# Patient Record
Sex: FEMALE | Race: BLACK OR AFRICAN AMERICAN | NOT HISPANIC OR LATINO
[De-identification: names, ages, dates, MRNs, and addresses within clinical notes are randomized per-mention and may not be internally consistent; named-entity substitution may affect disease eponyms.]

---

## 2017-01-04 ENCOUNTER — APPOINTMENT (OUTPATIENT)
Dept: INFUSION THERAPY | Facility: CLINIC | Age: 72
End: 2017-01-04

## 2017-01-06 LAB
BASOPHILS # BLD: 0.09 TH/MM3
BASOPHILS NFR BLD: 2.8 %
EOSINOPHIL # BLD: 0.06 TH/MM3
EOSINOPHIL NFR BLD: 1.9 %
ERYTHROCYTE [DISTWIDTH] IN BLOOD BY AUTOMATED COUNT: 15.1 %
GRANULOCYTES # BLD: 1.35 TH/MM3
GRANULOCYTES NFR BLD: 41.9 %
HCT VFR BLD AUTO: 29.5 %
HGB BLD-MCNC: 9.6 G/DL
IMM GRANULOCYTES # BLD: 0.38 TH/MM3
IMM GRANULOCYTES NFR BLD: 11.8 %
LYMPHOCYTES # BLD: 0.99 TH/MM3
LYMPHOCYTES NFR BLD: 30.7 %
MCH RBC QN AUTO: 34.7 PG
MCHC RBC AUTO-ENTMCNC: 32.5 G/DL
MCV RBC AUTO: 106.5 FL
MONOCYTES # BLD: 0.35 TH/MM3
MONOCYTES NFR BLD: 10.9 %
PLATELET # BLD: 204 TH/MM3
PMV BLD AUTO: 10.3 FL
RBC # BLD AUTO: 2.77 MIL/MM3
WBC # BLD: 3.22 TH/MM3

## 2017-01-11 ENCOUNTER — APPOINTMENT (OUTPATIENT)
Dept: INFUSION THERAPY | Facility: CLINIC | Age: 72
End: 2017-01-11

## 2017-01-11 VITALS
TEMPERATURE: 98.6 F | HEART RATE: 93 BPM | DIASTOLIC BLOOD PRESSURE: 58 MMHG | RESPIRATION RATE: 16 BRPM | SYSTOLIC BLOOD PRESSURE: 115 MMHG

## 2017-01-17 LAB
BASOPHILS # BLD: 0.15 TH/MM3
BASOPHILS NFR BLD: 2.7 %
EOSINOPHIL # BLD: 0.3 TH/MM3
EOSINOPHIL NFR BLD: 5.5 %
ERYTHROCYTE [DISTWIDTH] IN BLOOD BY AUTOMATED COUNT: 15.4 %
GRANULOCYTES # BLD: 2.78 TH/MM3
GRANULOCYTES NFR BLD: 50.9 %
HCT VFR BLD AUTO: 30.9 %
HGB BLD-MCNC: 10 G/DL
IMM GRANULOCYTES # BLD: 0.12 TH/MM3
IMM GRANULOCYTES NFR BLD: 2.2 %
LYMPHOCYTES # BLD: 1.26 TH/MM3
LYMPHOCYTES NFR BLD: 23.1 %
MCH RBC QN AUTO: 35.6 PG
MCHC RBC AUTO-ENTMCNC: 32.4 G/DL
MCV RBC AUTO: 110 FL
MONOCYTES # BLD: 0.85 TH/MM3
MONOCYTES NFR BLD: 15.6 %
PLATELET # BLD: 448 TH/MM3
PMV BLD AUTO: 9.8 FL
RBC # BLD AUTO: 2.81 MIL/MM3
WBC # BLD: 5.46 TH/MM3

## 2017-01-20 ENCOUNTER — APPOINTMENT (OUTPATIENT)
Dept: INFUSION THERAPY | Facility: CLINIC | Age: 72
End: 2017-01-20

## 2017-01-25 ENCOUNTER — APPOINTMENT (OUTPATIENT)
Dept: INFUSION THERAPY | Facility: CLINIC | Age: 72
End: 2017-01-25

## 2017-01-26 LAB
BASOPHILS # BLD: 0.08 TH/MM3
BASOPHILS NFR BLD: 2.7 %
EOSINOPHIL # BLD: 0.13 TH/MM3
EOSINOPHIL NFR BLD: 4.3 %
ERYTHROCYTE [DISTWIDTH] IN BLOOD BY AUTOMATED COUNT: 14 %
GRANULOCYTES # BLD: 1.16 TH/MM3
GRANULOCYTES NFR BLD: 38.6 %
HCT VFR BLD AUTO: 32 %
HGB BLD-MCNC: 10.8 G/DL
IMM GRANULOCYTES # BLD: 0.02 TH/MM3
IMM GRANULOCYTES NFR BLD: 0.7 %
LYMPHOCYTES # BLD: 1.05 TH/MM3
LYMPHOCYTES NFR BLD: 35 %
MCH RBC QN AUTO: 35.4 PG
MCHC RBC AUTO-ENTMCNC: 33.8 G/DL
MCV RBC AUTO: 104.9 FL
MONOCYTES # BLD: 0.56 TH/MM3
MONOCYTES NFR BLD: 18.7 %
PLATELET # BLD: 214 TH/MM3
PMV BLD AUTO: 9.9 FL
RBC # BLD AUTO: 3.05 MIL/MM3
WBC # BLD: 3 TH/MM3

## 2017-02-08 ENCOUNTER — APPOINTMENT (OUTPATIENT)
Dept: INFUSION THERAPY | Facility: CLINIC | Age: 72
End: 2017-02-08

## 2017-02-15 ENCOUNTER — APPOINTMENT (OUTPATIENT)
Dept: INFUSION THERAPY | Facility: CLINIC | Age: 72
End: 2017-02-15

## 2017-02-16 LAB
BASOPHILS # BLD: 0.14 TH/MM3
BASOPHILS NFR BLD: 3.7 %
EOSINOPHIL # BLD: 0.37 TH/MM3
EOSINOPHIL NFR BLD: 9.7 %
ERYTHROCYTE [DISTWIDTH] IN BLOOD BY AUTOMATED COUNT: 12.8 %
GRANULOCYTES # BLD: 1.57 TH/MM3
GRANULOCYTES NFR BLD: 41.3 %
HCT VFR BLD AUTO: 33.3 %
HGB BLD-MCNC: 11.1 G/DL
IMM GRANULOCYTES # BLD: 0.04 TH/MM3
IMM GRANULOCYTES NFR BLD: 1.1 %
LYMPHOCYTES # BLD: 1.05 TH/MM3
LYMPHOCYTES NFR BLD: 27.6 %
MCH RBC QN AUTO: 35.1 PG
MCHC RBC AUTO-ENTMCNC: 33.3 G/DL
MCV RBC AUTO: 105.4 FL
MONOCYTES # BLD: 0.63 TH/MM3
MONOCYTES NFR BLD: 16.6 %
PLATELET # BLD: 133 TH/MM3
PMV BLD AUTO: 10.2 FL
RBC # BLD AUTO: 3.16 MIL/MM3
WBC # BLD: 3.8 TH/MM3

## 2017-02-21 ENCOUNTER — APPOINTMENT (OUTPATIENT)
Dept: INFUSION THERAPY | Facility: CLINIC | Age: 72
End: 2017-02-21

## 2017-02-21 ENCOUNTER — APPOINTMENT (OUTPATIENT)
Dept: HEMATOLOGY ONCOLOGY | Facility: CLINIC | Age: 72
End: 2017-02-21

## 2017-02-21 VITALS
SYSTOLIC BLOOD PRESSURE: 143 MMHG | RESPIRATION RATE: 14 BRPM | HEART RATE: 94 BPM | HEIGHT: 62 IN | DIASTOLIC BLOOD PRESSURE: 63 MMHG | WEIGHT: 143 LBS | TEMPERATURE: 97.6 F | BODY MASS INDEX: 26.31 KG/M2

## 2017-02-21 LAB
BASOPHILS # BLD: 0.07 TH/MM3
BASOPHILS NFR BLD: 1.9 %
EOSINOPHIL # BLD: 0.26 TH/MM3
EOSINOPHIL NFR BLD: 7 %
ERYTHROCYTE [DISTWIDTH] IN BLOOD BY AUTOMATED COUNT: 12.8 %
GRANULOCYTES # BLD: 2.14 TH/MM3
GRANULOCYTES NFR BLD: 57.2 %
HCT VFR BLD AUTO: 33.7 %
HGB BLD-MCNC: 11.3 G/DL
IMM GRANULOCYTES # BLD: 0.05 TH/MM3
IMM GRANULOCYTES NFR BLD: 1.3 %
LYMPHOCYTES # BLD: 0.97 TH/MM3
LYMPHOCYTES NFR BLD: 25.9 %
MCH RBC QN AUTO: 34.8 PG
MCHC RBC AUTO-ENTMCNC: 33.5 G/DL
MCV RBC AUTO: 103.7 FL
MONOCYTES # BLD: 0.25 TH/MM3
MONOCYTES NFR BLD: 6.7 %
PLATELET # BLD: 126 TH/MM3
PMV BLD AUTO: 10 FL
RBC # BLD AUTO: 3.25 MIL/MM3
WBC # BLD: 3.74 TH/MM3

## 2017-03-01 ENCOUNTER — APPOINTMENT (OUTPATIENT)
Dept: INFUSION THERAPY | Facility: CLINIC | Age: 72
End: 2017-03-01

## 2017-03-08 ENCOUNTER — APPOINTMENT (OUTPATIENT)
Dept: INFUSION THERAPY | Facility: CLINIC | Age: 72
End: 2017-03-08

## 2017-03-15 ENCOUNTER — APPOINTMENT (OUTPATIENT)
Dept: INFUSION THERAPY | Facility: CLINIC | Age: 72
End: 2017-03-15

## 2017-03-22 ENCOUNTER — APPOINTMENT (OUTPATIENT)
Dept: HEMATOLOGY ONCOLOGY | Facility: CLINIC | Age: 72
End: 2017-03-22

## 2017-03-22 VITALS
WEIGHT: 141 LBS | TEMPERATURE: 97.9 F | HEART RATE: 82 BPM | HEIGHT: 62 IN | RESPIRATION RATE: 15 BRPM | BODY MASS INDEX: 25.95 KG/M2 | DIASTOLIC BLOOD PRESSURE: 63 MMHG | SYSTOLIC BLOOD PRESSURE: 131 MMHG

## 2017-03-22 LAB
BASOPHILS # BLD: 0.04 TH/MM3
BASOPHILS NFR BLD: 1.8 %
EOSINOPHIL # BLD: 0.06 TH/MM3
EOSINOPHIL NFR BLD: 2.7 %
ERYTHROCYTE [DISTWIDTH] IN BLOOD BY AUTOMATED COUNT: 13.4 %
GRANULOCYTES # BLD: 0.85 TH/MM3
GRANULOCYTES NFR BLD: 37.9 %
HCT VFR BLD AUTO: 28.6 %
HGB BLD-MCNC: 9.5 G/DL
IMM GRANULOCYTES # BLD: 0.03 TH/MM3
IMM GRANULOCYTES NFR BLD: 1.3 %
LYMPHOCYTES # BLD: 0.84 TH/MM3
LYMPHOCYTES NFR BLD: 37.5 %
MCH RBC QN AUTO: 33.9 PG
MCHC RBC AUTO-ENTMCNC: 33.2 G/DL
MCV RBC AUTO: 102.1 FL
MONOCYTES # BLD: 0.42 TH/MM3
MONOCYTES NFR BLD: 18.8 %
PLATELET # BLD: 171 TH/MM3
PMV BLD AUTO: 9.4 FL
RBC # BLD AUTO: 2.8 MIL/MM3
WBC # BLD: 2.24 TH/MM3

## 2017-03-29 ENCOUNTER — APPOINTMENT (OUTPATIENT)
Dept: INFUSION THERAPY | Facility: CLINIC | Age: 72
End: 2017-03-29

## 2017-04-05 ENCOUNTER — APPOINTMENT (OUTPATIENT)
Dept: INFUSION THERAPY | Facility: CLINIC | Age: 72
End: 2017-04-05

## 2017-04-06 LAB
BASOPHILS # BLD: 0.06 TH/MM3
BASOPHILS NFR BLD: 2 %
EOSINOPHIL # BLD: 0.18 TH/MM3
EOSINOPHIL NFR BLD: 5.9 %
ERYTHROCYTE [DISTWIDTH] IN BLOOD BY AUTOMATED COUNT: 14.3 %
GRANULOCYTES # BLD: 1.03 TH/MM3
GRANULOCYTES NFR BLD: 33.5 %
HCT VFR BLD AUTO: 25.9 %
HGB BLD-MCNC: 8.5 G/DL
IMM GRANULOCYTES # BLD: 0.04 TH/MM3
IMM GRANULOCYTES NFR BLD: 1.3 %
LYMPHOCYTES # BLD: 1.18 TH/MM3
LYMPHOCYTES NFR BLD: 38.4 %
MCH RBC QN AUTO: 34.1 PG
MCHC RBC AUTO-ENTMCNC: 32.8 G/DL
MCV RBC AUTO: 104 FL
MONOCYTES # BLD: 0.58 TH/MM3
MONOCYTES NFR BLD: 18.9 %
PLATELET # BLD: 181 TH/MM3
PMV BLD AUTO: 10 FL
RBC # BLD AUTO: 2.49 MIL/MM3
WBC # BLD: 3.07 TH/MM3

## 2017-04-12 ENCOUNTER — APPOINTMENT (OUTPATIENT)
Dept: HEMATOLOGY ONCOLOGY | Facility: CLINIC | Age: 72
End: 2017-04-12

## 2017-04-12 ENCOUNTER — APPOINTMENT (OUTPATIENT)
Dept: INFUSION THERAPY | Facility: CLINIC | Age: 72
End: 2017-04-12

## 2017-04-12 ENCOUNTER — RESULT REVIEW (OUTPATIENT)
Age: 72
End: 2017-04-12

## 2017-04-13 ENCOUNTER — APPOINTMENT (OUTPATIENT)
Dept: HEMATOLOGY ONCOLOGY | Facility: CLINIC | Age: 72
End: 2017-04-13

## 2017-04-13 VITALS
SYSTOLIC BLOOD PRESSURE: 130 MMHG | BODY MASS INDEX: 26.31 KG/M2 | WEIGHT: 143 LBS | DIASTOLIC BLOOD PRESSURE: 62 MMHG | HEIGHT: 62 IN | HEART RATE: 91 BPM | TEMPERATURE: 98.2 F

## 2017-04-19 ENCOUNTER — APPOINTMENT (OUTPATIENT)
Dept: HEMATOLOGY ONCOLOGY | Facility: CLINIC | Age: 72
End: 2017-04-19

## 2017-04-19 ENCOUNTER — APPOINTMENT (OUTPATIENT)
Dept: INFUSION THERAPY | Facility: CLINIC | Age: 72
End: 2017-04-19

## 2017-04-19 ENCOUNTER — RESULT REVIEW (OUTPATIENT)
Age: 72
End: 2017-04-19

## 2017-04-19 LAB
BASOPHILS # BLD: 0.04 TH/MM3
BASOPHILS NFR BLD: 1.2 %
EOSINOPHIL # BLD: 0.1 TH/MM3
EOSINOPHIL NFR BLD: 3 %
ERYTHROCYTE [DISTWIDTH] IN BLOOD BY AUTOMATED COUNT: 14.9 %
GRANULOCYTES # BLD: 1.32 TH/MM3
GRANULOCYTES NFR BLD: 39.7 %
HCT VFR BLD AUTO: 28 %
HGB BLD-MCNC: 9.2 G/DL
IMM GRANULOCYTES # BLD: 0.03 TH/MM3
IMM GRANULOCYTES NFR BLD: 0.9 %
LYMPHOCYTES # BLD: 1.26 TH/MM3
LYMPHOCYTES NFR BLD: 37.8 %
MCH RBC QN AUTO: 34.8 PG
MCHC RBC AUTO-ENTMCNC: 32.9 G/DL
MCV RBC AUTO: 106.1 FL
MONOCYTES # BLD: 0.58 TH/MM3
MONOCYTES NFR BLD: 17.4 %
PLATELET # BLD: 260 TH/MM3
PMV BLD AUTO: 9.7 FL
RBC # BLD AUTO: 2.64 MIL/MM3
WBC # BLD: 3.33 TH/MM3

## 2017-04-28 ENCOUNTER — APPOINTMENT (OUTPATIENT)
Dept: INFUSION THERAPY | Facility: CLINIC | Age: 72
End: 2017-04-28

## 2017-05-01 LAB
BASOPHILS # BLD: 0.08 TH/MM3
BASOPHILS NFR BLD: 2.4 %
EOSINOPHIL # BLD: 0.25 TH/MM3
EOSINOPHIL NFR BLD: 7.5 %
ERYTHROCYTE [DISTWIDTH] IN BLOOD BY AUTOMATED COUNT: 15 %
GRANULOCYTES # BLD: 1.41 TH/MM3
GRANULOCYTES NFR BLD: 42.5 %
HCT VFR BLD AUTO: 28.1 %
HGB BLD-MCNC: 9.1 G/DL
IMM GRANULOCYTES # BLD: 0.02 TH/MM3
IMM GRANULOCYTES NFR BLD: 0.6 %
LYMPHOCYTES # BLD: 1.06 TH/MM3
LYMPHOCYTES NFR BLD: 31.9 %
MCH RBC QN AUTO: 33.7 PG
MCHC RBC AUTO-ENTMCNC: 32.4 G/DL
MCV RBC AUTO: 104.1 FL
MONOCYTES # BLD: 0.5 TH/MM3
MONOCYTES NFR BLD: 15.1 %
PLATELET # BLD: 197 TH/MM3
PMV BLD AUTO: 10.1 FL
RBC # BLD AUTO: 2.7 MIL/MM3
WBC # BLD: 3.32 TH/MM3

## 2017-05-10 ENCOUNTER — APPOINTMENT (OUTPATIENT)
Dept: HEMATOLOGY ONCOLOGY | Facility: CLINIC | Age: 72
End: 2017-05-10

## 2017-05-10 ENCOUNTER — APPOINTMENT (OUTPATIENT)
Dept: INFUSION THERAPY | Facility: CLINIC | Age: 72
End: 2017-05-10

## 2017-05-10 VITALS
BODY MASS INDEX: 27.05 KG/M2 | HEART RATE: 84 BPM | HEIGHT: 62 IN | DIASTOLIC BLOOD PRESSURE: 65 MMHG | RESPIRATION RATE: 14 BRPM | TEMPERATURE: 98.2 F | WEIGHT: 147 LBS | SYSTOLIC BLOOD PRESSURE: 148 MMHG

## 2017-05-10 LAB
BASOPHILS # BLD: 0.05 TH/MM3
BASOPHILS NFR BLD: 2.2 %
EOSINOPHIL # BLD: 0.09 TH/MM3
EOSINOPHIL NFR BLD: 3.9 %
ERYTHROCYTE [DISTWIDTH] IN BLOOD BY AUTOMATED COUNT: 15 %
GRANULOCYTES # BLD: 1 TH/MM3
GRANULOCYTES NFR BLD: 43.9 %
HCT VFR BLD AUTO: 29.5 %
HGB BLD-MCNC: 9.7 G/DL
IMM GRANULOCYTES # BLD: 0.11 TH/MM3
IMM GRANULOCYTES NFR BLD: 4.8 %
LYMPHOCYTES # BLD: 0.72 TH/MM3
LYMPHOCYTES NFR BLD: 31.6 %
MCH RBC QN AUTO: 34.4 PG
MCHC RBC AUTO-ENTMCNC: 32.9 G/DL
MCV RBC AUTO: 104.6 FL
MONOCYTES # BLD: 0.31 TH/MM3
MONOCYTES NFR BLD: 13.6 %
PLATELET # BLD: 146 TH/MM3
PMV BLD AUTO: 9.7 FL
RBC # BLD AUTO: 2.82 MIL/MM3
WBC # BLD: 2.28 TH/MM3

## 2017-05-17 ENCOUNTER — APPOINTMENT (OUTPATIENT)
Dept: INFUSION THERAPY | Facility: CLINIC | Age: 72
End: 2017-05-17

## 2017-05-24 ENCOUNTER — APPOINTMENT (OUTPATIENT)
Dept: INFUSION THERAPY | Facility: CLINIC | Age: 72
End: 2017-05-24

## 2017-05-24 VITALS
DIASTOLIC BLOOD PRESSURE: 73 MMHG | HEART RATE: 80 BPM | SYSTOLIC BLOOD PRESSURE: 133 MMHG | TEMPERATURE: 97.8 F | RESPIRATION RATE: 18 BRPM

## 2017-05-26 LAB
BASOPHILS # BLD: 0.06 TH/MM3
BASOPHILS NFR BLD: 1.7 %
EOSINOPHIL # BLD: 0.23 TH/MM3
EOSINOPHIL NFR BLD: 6.6 %
ERYTHROCYTE [DISTWIDTH] IN BLOOD BY AUTOMATED COUNT: 14.7 %
GRANULOCYTES # BLD: 1.36 TH/MM3
GRANULOCYTES NFR BLD: 38.7 %
HCT VFR BLD AUTO: 29.6 %
HGB BLD-MCNC: 9.6 G/DL
IMM GRANULOCYTES # BLD: 0.06 TH/MM3
IMM GRANULOCYTES NFR BLD: 1.7 %
LYMPHOCYTES # BLD: 1.35 TH/MM3
LYMPHOCYTES NFR BLD: 38.5 %
MCH RBC QN AUTO: 34.7 PG
MCHC RBC AUTO-ENTMCNC: 32.4 G/DL
MCV RBC AUTO: 106.9 FL
MONOCYTES # BLD: 0.45 TH/MM3
MONOCYTES NFR BLD: 12.8 %
PLATELET # BLD: 181 TH/MM3
PMV BLD AUTO: 9.5 FL
RBC # BLD AUTO: 2.77 MIL/MM3
WBC # BLD: 3.51 TH/MM3

## 2017-05-31 ENCOUNTER — APPOINTMENT (OUTPATIENT)
Dept: INFUSION THERAPY | Facility: CLINIC | Age: 72
End: 2017-05-31

## 2017-06-07 ENCOUNTER — APPOINTMENT (OUTPATIENT)
Dept: INFUSION THERAPY | Facility: CLINIC | Age: 72
End: 2017-06-07

## 2017-06-07 ENCOUNTER — APPOINTMENT (OUTPATIENT)
Dept: HEMATOLOGY ONCOLOGY | Facility: CLINIC | Age: 72
End: 2017-06-07

## 2017-06-07 VITALS
HEIGHT: 62 IN | DIASTOLIC BLOOD PRESSURE: 70 MMHG | BODY MASS INDEX: 27.05 KG/M2 | TEMPERATURE: 98.9 F | SYSTOLIC BLOOD PRESSURE: 150 MMHG | HEART RATE: 80 BPM | WEIGHT: 147 LBS | RESPIRATION RATE: 14 BRPM

## 2017-06-07 LAB
BASOPHILS # BLD: 0.05 TH/MM3
BASOPHILS NFR BLD: 2.1 %
EOSINOPHIL # BLD: 0.06 TH/MM3
EOSINOPHIL NFR BLD: 2.5 %
ERYTHROCYTE [DISTWIDTH] IN BLOOD BY AUTOMATED COUNT: 14.1 %
GRANULOCYTES # BLD: 0.93 TH/MM3
GRANULOCYTES NFR BLD: 38.2 %
HCT VFR BLD AUTO: 32.5 %
HGB BLD-MCNC: 10.8 G/DL
IMM GRANULOCYTES # BLD: 0.06 TH/MM3
IMM GRANULOCYTES NFR BLD: 2.5 %
LYMPHOCYTES # BLD: 0.91 TH/MM3
LYMPHOCYTES NFR BLD: 37.4 %
MCH RBC QN AUTO: 35.1 PG
MCHC RBC AUTO-ENTMCNC: 33.2 G/DL
MCV RBC AUTO: 105.5 FL
MONOCYTES # BLD: 0.42 TH/MM3
MONOCYTES NFR BLD: 17.3 %
PLATELET # BLD: 193 TH/MM3
PMV BLD AUTO: 9.8 FL
RBC # BLD AUTO: 3.08 MIL/MM3
WBC # BLD: 2.43 TH/MM3

## 2017-06-14 ENCOUNTER — APPOINTMENT (OUTPATIENT)
Dept: INFUSION THERAPY | Facility: CLINIC | Age: 72
End: 2017-06-14

## 2017-06-21 ENCOUNTER — APPOINTMENT (OUTPATIENT)
Dept: INFUSION THERAPY | Facility: CLINIC | Age: 72
End: 2017-06-21

## 2017-06-21 LAB
BASOPHILS # BLD: 0.1 TH/MM3
BASOPHILS NFR BLD: 3 %
EOSINOPHIL # BLD: 0.15 TH/MM3
EOSINOPHIL NFR BLD: 4.6 %
ERYTHROCYTE [DISTWIDTH] IN BLOOD BY AUTOMATED COUNT: 13.2 %
GRANULOCYTES # BLD: 1.58 TH/MM3
GRANULOCYTES NFR BLD: 48.3 %
HCT VFR BLD AUTO: 30.6 %
HGB BLD-MCNC: 10.1 G/DL
IMM GRANULOCYTES # BLD: 0.06 TH/MM3
IMM GRANULOCYTES NFR BLD: 1.8 %
LYMPHOCYTES # BLD: 1.09 TH/MM3
LYMPHOCYTES NFR BLD: 33.2 %
MCH RBC QN AUTO: 34.8 PG
MCHC RBC AUTO-ENTMCNC: 33 G/DL
MCV RBC AUTO: 105.5 FL
MONOCYTES # BLD: 0.3 TH/MM3
MONOCYTES NFR BLD: 9.1 %
PLATELET # BLD: 202 TH/MM3
PMV BLD AUTO: 10.1 FL
RBC # BLD AUTO: 2.9 MIL/MM3
WBC # BLD: 3.28 TH/MM3

## 2017-06-22 ENCOUNTER — RX RENEWAL (OUTPATIENT)
Age: 72
End: 2017-06-22

## 2017-06-27 ENCOUNTER — RX RENEWAL (OUTPATIENT)
Age: 72
End: 2017-06-27

## 2017-06-28 ENCOUNTER — RESULT REVIEW (OUTPATIENT)
Age: 72
End: 2017-06-28

## 2017-06-28 ENCOUNTER — APPOINTMENT (OUTPATIENT)
Dept: INFUSION THERAPY | Facility: CLINIC | Age: 72
End: 2017-06-28

## 2017-07-05 ENCOUNTER — APPOINTMENT (OUTPATIENT)
Dept: HEMATOLOGY ONCOLOGY | Facility: CLINIC | Age: 72
End: 2017-07-05

## 2017-07-05 ENCOUNTER — RESULT REVIEW (OUTPATIENT)
Age: 72
End: 2017-07-05

## 2017-07-05 ENCOUNTER — OUTPATIENT (OUTPATIENT)
Dept: OUTPATIENT SERVICES | Facility: HOSPITAL | Age: 72
LOS: 1 days | Discharge: HOME | End: 2017-07-05

## 2017-07-05 ENCOUNTER — APPOINTMENT (OUTPATIENT)
Dept: INFUSION THERAPY | Facility: CLINIC | Age: 72
End: 2017-07-05

## 2017-07-05 VITALS
WEIGHT: 147 LBS | SYSTOLIC BLOOD PRESSURE: 138 MMHG | HEART RATE: 89 BPM | RESPIRATION RATE: 14 BRPM | HEIGHT: 62 IN | TEMPERATURE: 98.3 F | DIASTOLIC BLOOD PRESSURE: 57 MMHG | BODY MASS INDEX: 27.05 KG/M2

## 2017-07-05 DIAGNOSIS — D46.9 MYELODYSPLASTIC SYNDROME, UNSPECIFIED: ICD-10-CM

## 2017-07-12 ENCOUNTER — APPOINTMENT (OUTPATIENT)
Dept: INFUSION THERAPY | Facility: CLINIC | Age: 72
End: 2017-07-12

## 2017-07-17 ENCOUNTER — RX RENEWAL (OUTPATIENT)
Age: 72
End: 2017-07-17

## 2017-07-17 LAB
ALBUMIN SERPL-MCNC: 3.6 G/DL
ALBUMIN/GLOB SERPL: 1.44
ALP SERPL-CCNC: 74 IU/L
ALT SERPL-CCNC: 34 IU/L
ANION GAP SERPL CALC-SCNC: 8 MEQ/L
AST SERPL-CCNC: 25 IU/L
BASOPHILS # BLD: 0.03 TH/MM3
BASOPHILS # BLD: 0.04 TH/MM3
BASOPHILS NFR BLD: 1.2 %
BASOPHILS NFR BLD: 1.6 %
BILIRUB SERPL-MCNC: 0.5 MG/DL
BUN SERPL-MCNC: 14 MG/DL
BUN/CREAT SERPL: 9.2 %
CALCIUM SERPL-MCNC: 9 MG/DL
CHLORIDE SERPL-SCNC: 108 MEQ/L
CO2 SERPL-SCNC: 20 MEQ/L
CREAT SERPL-MCNC: 1.53 MG/DL
EOSINOPHIL # BLD: 0.1 TH/MM3
EOSINOPHIL # BLD: 0.1 TH/MM3
EOSINOPHIL NFR BLD: 3.9 %
EOSINOPHIL NFR BLD: 4 %
ERYTHROCYTE [DISTWIDTH] IN BLOOD BY AUTOMATED COUNT: 13.8 %
ERYTHROCYTE [DISTWIDTH] IN BLOOD BY AUTOMATED COUNT: 13.8 %
GFR SERPL CREATININE-BSD FRML MDRD: 33
GLUCOSE SERPL-MCNC: 244 MG/DL
GRANULOCYTES # BLD: 0.95 TH/MM3
GRANULOCYTES # BLD: 1.01 TH/MM3
GRANULOCYTES NFR BLD: 38.3 %
GRANULOCYTES NFR BLD: 38.9 %
HCT VFR BLD AUTO: 32.3 %
HCT VFR BLD AUTO: 33.2 %
HGB BLD-MCNC: 10.8 G/DL
HGB BLD-MCNC: 11 G/DL
IMM GRANULOCYTES # BLD: 0.01 TH/MM3
IMM GRANULOCYTES # BLD: 0.02 TH/MM3
IMM GRANULOCYTES NFR BLD: 0.4 %
IMM GRANULOCYTES NFR BLD: 0.8 %
LYMPHOCYTES # BLD: 0.87 TH/MM3
LYMPHOCYTES # BLD: 0.9 TH/MM3
LYMPHOCYTES NFR BLD: 33.6 %
LYMPHOCYTES NFR BLD: 36.3 %
MCH RBC QN AUTO: 34.6 PG
MCH RBC QN AUTO: 35 PG
MCHC RBC AUTO-ENTMCNC: 33.1 G/DL
MCHC RBC AUTO-ENTMCNC: 33.4 G/DL
MCV RBC AUTO: 104.4 FL
MCV RBC AUTO: 104.5 FL
MONOCYTES # BLD: 0.48 TH/MM3
MONOCYTES # BLD: 0.56 TH/MM3
MONOCYTES NFR BLD: 19.4 %
MONOCYTES NFR BLD: 21.6 %
PLATELET # BLD: 189 TH/MM3
PLATELET # BLD: 191 TH/MM3
PMV BLD AUTO: 9.3 FL
PMV BLD AUTO: 9.7 FL
POTASSIUM SERPL-SCNC: 4.3 MMOL/L
PROT SERPL-MCNC: 6.1 G/DL
RBC # BLD AUTO: 3.09 MIL/MM3
RBC # BLD AUTO: 3.18 MIL/MM3
SODIUM SERPL-SCNC: 136 MEQ/L
WBC # BLD: 2.48 TH/MM3
WBC # BLD: 2.59 TH/MM3

## 2017-07-19 ENCOUNTER — APPOINTMENT (OUTPATIENT)
Dept: INFUSION THERAPY | Facility: CLINIC | Age: 72
End: 2017-07-19

## 2017-07-24 ENCOUNTER — APPOINTMENT (OUTPATIENT)
Dept: INFUSION THERAPY | Facility: CLINIC | Age: 72
End: 2017-07-24

## 2017-07-24 ENCOUNTER — APPOINTMENT (OUTPATIENT)
Dept: HEMATOLOGY ONCOLOGY | Facility: CLINIC | Age: 72
End: 2017-07-24

## 2017-07-24 VITALS
DIASTOLIC BLOOD PRESSURE: 71 MMHG | HEIGHT: 62 IN | RESPIRATION RATE: 14 BRPM | BODY MASS INDEX: 26.68 KG/M2 | SYSTOLIC BLOOD PRESSURE: 150 MMHG | TEMPERATURE: 99.3 F | WEIGHT: 145 LBS | HEART RATE: 98 BPM

## 2017-07-24 LAB
BASOPHILS # BLD: 0.04 TH/MM3
BASOPHILS NFR BLD: 1 %
EOSINOPHIL # BLD: 0.23 TH/MM3
EOSINOPHIL NFR BLD: 5.6 %
ERYTHROCYTE [DISTWIDTH] IN BLOOD BY AUTOMATED COUNT: 13.1 %
GRANULOCYTES # BLD: 2.16 TH/MM3
GRANULOCYTES NFR BLD: 52.4 %
HCT VFR BLD AUTO: 33.8 %
HGB BLD-MCNC: 11.2 G/DL
IMM GRANULOCYTES # BLD: 0.02 TH/MM3
IMM GRANULOCYTES NFR BLD: 0.5 %
LYMPHOCYTES # BLD: 1.04 TH/MM3
LYMPHOCYTES NFR BLD: 25.2 %
MCH RBC QN AUTO: 34 PG
MCHC RBC AUTO-ENTMCNC: 33.1 G/DL
MCV RBC AUTO: 102.7 FL
MONOCYTES # BLD: 0.63 TH/MM3
MONOCYTES NFR BLD: 15.3 %
PLATELET # BLD: 170 TH/MM3
PMV BLD AUTO: 10.3 FL
RBC # BLD AUTO: 3.29 MIL/MM3
WBC # BLD: 4.12 TH/MM3

## 2017-07-24 RX ORDER — POLYETHYLENE GLYCOL 3350 AND ELECTROLYTES WITH LEMON FLAVOR 236; 22.74; 6.74; 5.86; 2.97 G/4L; G/4L; G/4L; G/4L; G/4L
236 POWDER, FOR SOLUTION ORAL
Qty: 4000 | Refills: 0 | Status: DISCONTINUED | COMMUNITY
Start: 2017-02-20

## 2017-07-26 ENCOUNTER — APPOINTMENT (OUTPATIENT)
Dept: HEMATOLOGY ONCOLOGY | Facility: CLINIC | Age: 72
End: 2017-07-26

## 2017-07-26 LAB
ALBUMIN SERPL-MCNC: 3.9 G/DL
ALBUMIN/GLOB SERPL: 1.56
ALP SERPL-CCNC: 82 IU/L
ALT SERPL-CCNC: 25 IU/L
ANION GAP SERPL CALC-SCNC: 8 MEQ/L
AST SERPL-CCNC: 18 IU/L
BILIRUB SERPL-MCNC: 0.7 MG/DL
BUN SERPL-MCNC: 19 MG/DL
BUN/CREAT SERPL: 13.3 %
CALCIUM SERPL-MCNC: 9.3 MG/DL
CHLORIDE SERPL-SCNC: 104 MEQ/L
CO2 SERPL-SCNC: 24 MEQ/L
CREAT SERPL-MCNC: 1.43 MG/DL
GFR SERPL CREATININE-BSD FRML MDRD: 36
GLUCOSE SERPL-MCNC: 206 MG/DL
POTASSIUM SERPL-SCNC: 4.3 MMOL/L
PROT SERPL-MCNC: 6.4 G/DL
SODIUM SERPL-SCNC: 136 MEQ/L

## 2017-08-21 ENCOUNTER — APPOINTMENT (OUTPATIENT)
Dept: HEMATOLOGY ONCOLOGY | Facility: CLINIC | Age: 72
End: 2017-08-21

## 2017-08-21 VITALS
HEART RATE: 85 BPM | WEIGHT: 149 LBS | RESPIRATION RATE: 14 BRPM | BODY MASS INDEX: 27.42 KG/M2 | SYSTOLIC BLOOD PRESSURE: 141 MMHG | HEIGHT: 62 IN | DIASTOLIC BLOOD PRESSURE: 73 MMHG | TEMPERATURE: 97.5 F

## 2017-08-21 LAB
BASOPHILS # BLD: 0.03 TH/MM3
BASOPHILS NFR BLD: 1.5 %
EOSINOPHIL # BLD: 0.09 TH/MM3
EOSINOPHIL NFR BLD: 4.5 %
ERYTHROCYTE [DISTWIDTH] IN BLOOD BY AUTOMATED COUNT: 14 %
GRANULOCYTES # BLD: 0.88 TH/MM3
GRANULOCYTES NFR BLD: 44 %
HCT VFR BLD AUTO: 28.5 %
HGB BLD-MCNC: 9.4 G/DL
IMM GRANULOCYTES # BLD: 0.03 TH/MM3
IMM GRANULOCYTES NFR BLD: 1.5 %
LYMPHOCYTES # BLD: 0.69 TH/MM3
LYMPHOCYTES NFR BLD: 34.5 %
MCH RBC QN AUTO: 33.6 PG
MCHC RBC AUTO-ENTMCNC: 33 G/DL
MCV RBC AUTO: 101.8 FL
MONOCYTES # BLD: 0.28 TH/MM3
MONOCYTES NFR BLD: 14 %
PLATELET # BLD: 158 TH/MM3
PMV BLD AUTO: 10.1 FL
RBC # BLD AUTO: 2.8 MIL/MM3
WBC # BLD: 2 TH/MM3

## 2017-08-25 ENCOUNTER — APPOINTMENT (OUTPATIENT)
Dept: HEMATOLOGY ONCOLOGY | Facility: CLINIC | Age: 72
End: 2017-08-25

## 2017-09-06 ENCOUNTER — APPOINTMENT (OUTPATIENT)
Dept: INFUSION THERAPY | Facility: CLINIC | Age: 72
End: 2017-09-06

## 2017-09-15 ENCOUNTER — APPOINTMENT (OUTPATIENT)
Dept: INFUSION THERAPY | Facility: CLINIC | Age: 72
End: 2017-09-15

## 2017-09-15 ENCOUNTER — RESULT REVIEW (OUTPATIENT)
Age: 72
End: 2017-09-15

## 2017-09-18 ENCOUNTER — RX RENEWAL (OUTPATIENT)
Age: 72
End: 2017-09-18

## 2017-09-19 ENCOUNTER — RX RENEWAL (OUTPATIENT)
Age: 72
End: 2017-09-19

## 2017-09-22 ENCOUNTER — APPOINTMENT (OUTPATIENT)
Dept: INFUSION THERAPY | Facility: CLINIC | Age: 72
End: 2017-09-22

## 2017-09-27 ENCOUNTER — APPOINTMENT (OUTPATIENT)
Dept: HEMATOLOGY ONCOLOGY | Facility: CLINIC | Age: 72
End: 2017-09-27

## 2017-09-27 ENCOUNTER — APPOINTMENT (OUTPATIENT)
Dept: INFUSION THERAPY | Facility: CLINIC | Age: 72
End: 2017-09-27

## 2017-09-27 VITALS
WEIGHT: 146 LBS | BODY MASS INDEX: 26.87 KG/M2 | OXYGEN SATURATION: 100 % | SYSTOLIC BLOOD PRESSURE: 147 MMHG | DIASTOLIC BLOOD PRESSURE: 72 MMHG | HEART RATE: 82 BPM | RESPIRATION RATE: 16 BRPM | TEMPERATURE: 97.3 F | HEIGHT: 62 IN

## 2017-09-27 LAB
BASOPHILS # BLD: 0.04 TH/MM3
BASOPHILS # BLD: 0.1 TH/MM3
BASOPHILS NFR BLD: 1.4 %
BASOPHILS NFR BLD: 3.1 %
EOSINOPHIL # BLD: 0.06 TH/MM3
EOSINOPHIL # BLD: 0.1 TH/MM3
EOSINOPHIL NFR BLD: 2 %
EOSINOPHIL NFR BLD: 3.1 %
ERYTHROCYTE [DISTWIDTH] IN BLOOD BY AUTOMATED COUNT: 14.9 %
ERYTHROCYTE [DISTWIDTH] IN BLOOD BY AUTOMATED COUNT: 14.9 %
GRANULOCYTES # BLD: 1.43 TH/MM3
GRANULOCYTES # BLD: 1.56 TH/MM3
GRANULOCYTES NFR BLD: 47.8 %
GRANULOCYTES NFR BLD: 48.6 %
HCT VFR BLD AUTO: 27.9 %
HCT VFR BLD AUTO: 31 %
HGB BLD-MCNC: 10.3 G/DL
HGB BLD-MCNC: 9.1 G/DL
IMM GRANULOCYTES # BLD: 0.03 TH/MM3
IMM GRANULOCYTES # BLD: 0.06 TH/MM3
IMM GRANULOCYTES NFR BLD: 1 %
IMM GRANULOCYTES NFR BLD: 1.8 %
LYMPHOCYTES # BLD: 0.86 TH/MM3
LYMPHOCYTES # BLD: 0.91 TH/MM3
LYMPHOCYTES NFR BLD: 26.4 %
LYMPHOCYTES NFR BLD: 31 %
MCH RBC QN AUTO: 34.1 PG
MCH RBC QN AUTO: 34.3 PG
MCHC RBC AUTO-ENTMCNC: 32.6 G/DL
MCHC RBC AUTO-ENTMCNC: 33.2 G/DL
MCV RBC AUTO: 103.3 FL
MCV RBC AUTO: 104.5 FL
MONOCYTES # BLD: 0.47 TH/MM3
MONOCYTES # BLD: 0.58 TH/MM3
MONOCYTES NFR BLD: 16 %
MONOCYTES NFR BLD: 17.8 %
PLATELET # BLD: 218 TH/MM3
PLATELET # BLD: 221 TH/MM3
PMV BLD AUTO: 9.5 FL
PMV BLD AUTO: 9.6 FL
RBC # BLD AUTO: 2.67 MIL/MM3
RBC # BLD AUTO: 3 MIL/MM3
WBC # BLD: 2.94 TH/MM3
WBC # BLD: 3.26 TH/MM3

## 2017-10-04 ENCOUNTER — APPOINTMENT (OUTPATIENT)
Dept: INFUSION THERAPY | Facility: CLINIC | Age: 72
End: 2017-10-04

## 2017-10-11 ENCOUNTER — RX RENEWAL (OUTPATIENT)
Age: 72
End: 2017-10-11

## 2017-10-13 ENCOUNTER — APPOINTMENT (OUTPATIENT)
Dept: INFUSION THERAPY | Facility: CLINIC | Age: 72
End: 2017-10-13

## 2017-10-16 LAB
BASOPHILS # BLD: 0.06 TH/MM3
BASOPHILS NFR BLD: 2.5 %
EOSINOPHIL # BLD: 0.24 TH/MM3
EOSINOPHIL NFR BLD: 10 %
ERYTHROCYTE [DISTWIDTH] IN BLOOD BY AUTOMATED COUNT: 13.4 %
GRANULOCYTES # BLD: 0.88 TH/MM3
GRANULOCYTES NFR BLD: 36.7 %
HCT VFR BLD AUTO: 27.6 %
HGB BLD-MCNC: 9.1 G/DL
IMM GRANULOCYTES # BLD: 0.02 TH/MM3
IMM GRANULOCYTES NFR BLD: 0.8 %
LYMPHOCYTES # BLD: 0.96 TH/MM3
LYMPHOCYTES NFR BLD: 40 %
MCH RBC QN AUTO: 34.1 PG
MCHC RBC AUTO-ENTMCNC: 33 G/DL
MCV RBC AUTO: 103.4 FL
MONOCYTES # BLD: 0.24 TH/MM3
MONOCYTES NFR BLD: 10 %
PLATELET # BLD: 161 TH/MM3
PMV BLD AUTO: 10.1 FL
RBC # BLD AUTO: 2.67 MIL/MM3
WBC # BLD: 2.4 TH/MM3

## 2017-10-19 ENCOUNTER — OUTPATIENT (OUTPATIENT)
Dept: OUTPATIENT SERVICES | Facility: HOSPITAL | Age: 72
LOS: 1 days | Discharge: HOME | End: 2017-10-19

## 2017-10-19 ENCOUNTER — APPOINTMENT (OUTPATIENT)
Dept: INFUSION THERAPY | Facility: CLINIC | Age: 72
End: 2017-10-19

## 2017-10-19 DIAGNOSIS — D46.9 MYELODYSPLASTIC SYNDROME, UNSPECIFIED: ICD-10-CM

## 2017-10-21 LAB
BASOPHILS # BLD: 0.05 TH/MM3
BASOPHILS NFR BLD: 1.8 %
EOSINOPHIL # BLD: 0.11 TH/MM3
EOSINOPHIL NFR BLD: 4 %
ERYTHROCYTE [DISTWIDTH] IN BLOOD BY AUTOMATED COUNT: 13.6 %
GRANULOCYTES # BLD: 0.99 TH/MM3
GRANULOCYTES NFR BLD: 35.5 %
HCT VFR BLD AUTO: 28.3 %
HGB BLD-MCNC: 9.2 G/DL
IMM GRANULOCYTES # BLD: 0.01 TH/MM3
IMM GRANULOCYTES NFR BLD: 0.4 %
LYMPHOCYTES # BLD: 1.24 TH/MM3
LYMPHOCYTES NFR BLD: 44.6 %
MCH RBC QN AUTO: 33.9 PG
MCHC RBC AUTO-ENTMCNC: 32.5 G/DL
MCV RBC AUTO: 104.4 FL
MONOCYTES # BLD: 0.38 TH/MM3
MONOCYTES NFR BLD: 13.7 %
PLATELET # BLD: 232 TH/MM3
PMV BLD AUTO: 8.9 FL
RBC # BLD AUTO: 2.71 MIL/MM3
WBC # BLD: 2.78 TH/MM3

## 2017-10-31 ENCOUNTER — APPOINTMENT (OUTPATIENT)
Dept: HEMATOLOGY ONCOLOGY | Facility: CLINIC | Age: 72
End: 2017-10-31

## 2017-10-31 ENCOUNTER — APPOINTMENT (OUTPATIENT)
Dept: INFUSION THERAPY | Facility: CLINIC | Age: 72
End: 2017-10-31

## 2017-10-31 VITALS
WEIGHT: 145 LBS | BODY MASS INDEX: 26.68 KG/M2 | DIASTOLIC BLOOD PRESSURE: 73 MMHG | SYSTOLIC BLOOD PRESSURE: 155 MMHG | TEMPERATURE: 98.3 F | RESPIRATION RATE: 14 BRPM | HEIGHT: 62 IN | HEART RATE: 89 BPM

## 2017-10-31 DIAGNOSIS — R06.02 SHORTNESS OF BREATH: ICD-10-CM

## 2017-10-31 LAB
BASOPHILS # BLD: 0.08 TH/MM3
BASOPHILS NFR BLD: 3.1 %
EOSINOPHIL # BLD: 0.21 TH/MM3
EOSINOPHIL NFR BLD: 8.2 %
ERYTHROCYTE [DISTWIDTH] IN BLOOD BY AUTOMATED COUNT: 13.8 %
GRANULOCYTES # BLD: 1.43 TH/MM3
GRANULOCYTES NFR BLD: 55.6 %
HCT VFR BLD AUTO: 31.9 %
HGB BLD-MCNC: 10.4 G/DL
IMM GRANULOCYTES # BLD: 0.04 TH/MM3
IMM GRANULOCYTES NFR BLD: 1.6 %
LYMPHOCYTES # BLD: 0.61 TH/MM3
LYMPHOCYTES NFR BLD: 23.7 %
MCH RBC QN AUTO: 34.3 PG
MCHC RBC AUTO-ENTMCNC: 32.6 G/DL
MCV RBC AUTO: 105.3 FL
MONOCYTES # BLD: 0.2 TH/MM3
MONOCYTES NFR BLD: 7.8 %
PLATELET # BLD: 185 TH/MM3
PMV BLD AUTO: 10.3 FL
RBC # BLD AUTO: 3.03 MIL/MM3
WBC # BLD: 2.57 TH/MM3

## 2017-11-01 LAB
ANION GAP SERPL CALC-SCNC: 8 MEQ/L
BUN SERPL-MCNC: 16 MG/DL
BUN/CREAT SERPL: 14.8 %
CALCIUM SERPL-MCNC: 9.4 MG/DL
CHLORIDE SERPL-SCNC: 107 MEQ/L
CO2 SERPL-SCNC: 22 MEQ/L
CREAT SERPL-MCNC: 1.08 MG/DL
GFR SERPL CREATININE-BSD FRML MDRD: 50
GLUCOSE SERPL-MCNC: 129 MG/DL
POTASSIUM SERPL-SCNC: 4 MMOL/L
SODIUM SERPL-SCNC: 137 MEQ/L

## 2017-11-07 ENCOUNTER — RX RENEWAL (OUTPATIENT)
Age: 72
End: 2017-11-07

## 2017-11-08 ENCOUNTER — APPOINTMENT (OUTPATIENT)
Dept: INFUSION THERAPY | Facility: CLINIC | Age: 72
End: 2017-11-08

## 2017-11-14 ENCOUNTER — APPOINTMENT (OUTPATIENT)
Dept: INFUSION THERAPY | Facility: CLINIC | Age: 72
End: 2017-11-14

## 2017-11-14 LAB
BASOPHILS # BLD: 0.06 TH/MM3
BASOPHILS NFR BLD: 2.3 %
EOSINOPHIL # BLD: 0.14 TH/MM3
EOSINOPHIL NFR BLD: 5.3 %
ERYTHROCYTE [DISTWIDTH] IN BLOOD BY AUTOMATED COUNT: 13.3 %
GRANULOCYTES # BLD: 1.01 TH/MM3
GRANULOCYTES NFR BLD: 38.6 %
HCT VFR BLD AUTO: 31.4 %
HGB BLD-MCNC: 10.4 G/DL
IMM GRANULOCYTES # BLD: 0.04 TH/MM3
IMM GRANULOCYTES NFR BLD: 1.5 %
LYMPHOCYTES # BLD: 0.98 TH/MM3
LYMPHOCYTES NFR BLD: 37.4 %
MCH RBC QN AUTO: 34.4 PG
MCHC RBC AUTO-ENTMCNC: 33.1 G/DL
MCV RBC AUTO: 104 FL
MONOCYTES # BLD: 0.39 TH/MM3
MONOCYTES NFR BLD: 14.9 %
PLATELET # BLD: 150 TH/MM3
PMV BLD AUTO: 10.4 FL
RBC # BLD AUTO: 3.02 MIL/MM3
WBC # BLD: 2.62 TH/MM3

## 2017-11-24 ENCOUNTER — OUTPATIENT (OUTPATIENT)
Dept: OUTPATIENT SERVICES | Facility: HOSPITAL | Age: 72
LOS: 1 days | Discharge: HOME | End: 2017-11-24

## 2017-11-24 ENCOUNTER — APPOINTMENT (OUTPATIENT)
Dept: INFUSION THERAPY | Facility: CLINIC | Age: 72
End: 2017-11-24

## 2017-11-24 DIAGNOSIS — R10.9 UNSPECIFIED ABDOMINAL PAIN: ICD-10-CM

## 2017-11-24 DIAGNOSIS — R06.02 SHORTNESS OF BREATH: ICD-10-CM

## 2017-11-24 LAB
BASOPHILS # BLD: 0.08 TH/MM3
BASOPHILS NFR BLD: 2.7 %
EOSINOPHIL # BLD: 0.18 TH/MM3
EOSINOPHIL NFR BLD: 6.1 %
ERYTHROCYTE [DISTWIDTH] IN BLOOD BY AUTOMATED COUNT: 13.4 %
GRANULOCYTES # BLD: 1.2 TH/MM3
GRANULOCYTES NFR BLD: 40.5 %
HCT VFR BLD AUTO: 32 %
HGB BLD-MCNC: 10.6 G/DL
IMM GRANULOCYTES # BLD: 0.05 TH/MM3
IMM GRANULOCYTES NFR BLD: 1.7 %
LYMPHOCYTES # BLD: 0.95 TH/MM3
LYMPHOCYTES NFR BLD: 32.1 %
MCH RBC QN AUTO: 34.4 PG
MCHC RBC AUTO-ENTMCNC: 33.1 G/DL
MCV RBC AUTO: 103.9 FL
MONOCYTES # BLD: 0.5 TH/MM3
MONOCYTES NFR BLD: 16.9 %
PLATELET # BLD: 206 TH/MM3
PMV BLD AUTO: 9.5 FL
RBC # BLD AUTO: 3.08 MIL/MM3
WBC # BLD: 2.96 TH/MM3

## 2017-11-29 ENCOUNTER — RX RENEWAL (OUTPATIENT)
Age: 72
End: 2017-11-29

## 2017-12-04 ENCOUNTER — APPOINTMENT (OUTPATIENT)
Dept: INFUSION THERAPY | Facility: CLINIC | Age: 72
End: 2017-12-04

## 2017-12-04 ENCOUNTER — APPOINTMENT (OUTPATIENT)
Dept: HEMATOLOGY ONCOLOGY | Facility: CLINIC | Age: 72
End: 2017-12-04

## 2017-12-04 VITALS
RESPIRATION RATE: 14 BRPM | TEMPERATURE: 97.6 F | SYSTOLIC BLOOD PRESSURE: 144 MMHG | WEIGHT: 148 LBS | HEART RATE: 81 BPM | BODY MASS INDEX: 27.23 KG/M2 | HEIGHT: 62 IN | DIASTOLIC BLOOD PRESSURE: 75 MMHG

## 2017-12-04 LAB
BASOPHILS # BLD: 0.06 TH/MM3
BASOPHILS NFR BLD: 1.6 %
EOSINOPHIL # BLD: 0.28 TH/MM3
EOSINOPHIL NFR BLD: 7.3 %
ERYTHROCYTE [DISTWIDTH] IN BLOOD BY AUTOMATED COUNT: 13.7 %
GRANULOCYTES # BLD: 1.81 TH/MM3
GRANULOCYTES NFR BLD: 46.8 %
HCT VFR BLD AUTO: 31.7 %
HGB BLD-MCNC: 10.2 G/DL
IMM GRANULOCYTES # BLD: 0.05 TH/MM3
IMM GRANULOCYTES NFR BLD: 1.3 %
LYMPHOCYTES # BLD: 1.07 TH/MM3
LYMPHOCYTES NFR BLD: 27.7 %
MCH RBC QN AUTO: 33.6 PG
MCHC RBC AUTO-ENTMCNC: 32.2 G/DL
MCV RBC AUTO: 104.3 FL
MONOCYTES # BLD: 0.59 TH/MM3
MONOCYTES NFR BLD: 15.3 %
PLATELET # BLD: 218 TH/MM3
PMV BLD AUTO: 9.7 FL
RBC # BLD AUTO: 3.04 MIL/MM3
WBC # BLD: 3.86 TH/MM3

## 2017-12-04 RX ORDER — DICYCLOMINE HYDROCHLORIDE 10 MG/1
10 CAPSULE ORAL
Refills: 0 | Status: DISCONTINUED | COMMUNITY
Start: 2017-05-10 | End: 2017-12-04

## 2017-12-04 RX ORDER — LENALIDOMIDE 5 MG/1
5 CAPSULE ORAL
Qty: 14 | Refills: 0 | Status: DISCONTINUED | COMMUNITY
Start: 2017-08-21 | End: 2017-12-04

## 2017-12-12 ENCOUNTER — APPOINTMENT (OUTPATIENT)
Dept: INFUSION THERAPY | Facility: CLINIC | Age: 72
End: 2017-12-12

## 2017-12-18 ENCOUNTER — APPOINTMENT (OUTPATIENT)
Dept: INFUSION THERAPY | Facility: CLINIC | Age: 72
End: 2017-12-18

## 2017-12-19 ENCOUNTER — APPOINTMENT (OUTPATIENT)
Dept: INFUSION THERAPY | Facility: CLINIC | Age: 72
End: 2017-12-19

## 2017-12-21 LAB
BASOPHILS # BLD: 0.06 TH/MM3
BASOPHILS NFR BLD: 2.5 %
EOSINOPHIL # BLD: 0.11 TH/MM3
EOSINOPHIL NFR BLD: 4.6 %
ERYTHROCYTE [DISTWIDTH] IN BLOOD BY AUTOMATED COUNT: 13.8 %
GRANULOCYTES # BLD: 0.77 TH/MM3
GRANULOCYTES NFR BLD: 32.3 %
HCT VFR BLD AUTO: 30.2 %
HGB BLD-MCNC: 9.7 G/DL
IMM GRANULOCYTES # BLD: 0.01 TH/MM3
IMM GRANULOCYTES NFR BLD: 0.4 %
LYMPHOCYTES # BLD: 1 TH/MM3
LYMPHOCYTES NFR BLD: 41.8 %
MCH RBC QN AUTO: 34 PG
MCHC RBC AUTO-ENTMCNC: 32.1 G/DL
MCV RBC AUTO: 106 FL
MONOCYTES # BLD: 0.44 TH/MM3
MONOCYTES NFR BLD: 18.4 %
PLATELET # BLD: 175 TH/MM3
PMV BLD AUTO: 10.1 FL
RBC # BLD AUTO: 2.85 MIL/MM3
WBC # BLD: 2.39 TH/MM3

## 2017-12-26 ENCOUNTER — APPOINTMENT (OUTPATIENT)
Dept: INFUSION THERAPY | Facility: CLINIC | Age: 72
End: 2017-12-26

## 2017-12-28 ENCOUNTER — APPOINTMENT (OUTPATIENT)
Dept: INFUSION THERAPY | Facility: CLINIC | Age: 72
End: 2017-12-28

## 2017-12-28 ENCOUNTER — RESULT REVIEW (OUTPATIENT)
Age: 72
End: 2017-12-28

## 2018-01-03 ENCOUNTER — APPOINTMENT (OUTPATIENT)
Dept: INFUSION THERAPY | Facility: CLINIC | Age: 73
End: 2018-01-03

## 2018-01-03 ENCOUNTER — APPOINTMENT (OUTPATIENT)
Dept: HEMATOLOGY ONCOLOGY | Facility: CLINIC | Age: 73
End: 2018-01-03

## 2018-01-05 ENCOUNTER — APPOINTMENT (OUTPATIENT)
Dept: INFUSION THERAPY | Facility: CLINIC | Age: 73
End: 2018-01-05

## 2018-01-05 VITALS
HEART RATE: 101 BPM | SYSTOLIC BLOOD PRESSURE: 145 MMHG | TEMPERATURE: 98 F | RESPIRATION RATE: 18 BRPM | DIASTOLIC BLOOD PRESSURE: 67 MMHG

## 2018-01-08 LAB
BASOPHILS # BLD: 0.06 TH/MM3
BASOPHILS NFR BLD: 1.4 %
EOSINOPHIL # BLD: 0.39 TH/MM3
EOSINOPHIL NFR BLD: 9.1 %
ERYTHROCYTE [DISTWIDTH] IN BLOOD BY AUTOMATED COUNT: 14.2 %
GRANULOCYTES # BLD: 1.82 TH/MM3
GRANULOCYTES NFR BLD: 42.7 %
HCT VFR BLD AUTO: 31.1 %
HGB BLD-MCNC: 10.3 G/DL
IMM GRANULOCYTES # BLD: 0.07 TH/MM3
IMM GRANULOCYTES NFR BLD: 1.6 %
LYMPHOCYTES # BLD: 1.46 TH/MM3
LYMPHOCYTES NFR BLD: 34.2 %
MCH RBC QN AUTO: 33.6 PG
MCHC RBC AUTO-ENTMCNC: 33.1 G/DL
MCV RBC AUTO: 101.3 FL
MONOCYTES # BLD: 0.47 TH/MM3
MONOCYTES NFR BLD: 11 %
PLATELET # BLD: 273 TH/MM3
PMV BLD AUTO: 10.3 FL
RBC # BLD AUTO: 3.07 MIL/MM3
WBC # BLD: 4.27 TH/MM3

## 2018-01-09 ENCOUNTER — RX RENEWAL (OUTPATIENT)
Age: 73
End: 2018-01-09

## 2018-01-09 ENCOUNTER — APPOINTMENT (OUTPATIENT)
Dept: HEMATOLOGY ONCOLOGY | Facility: CLINIC | Age: 73
End: 2018-01-09

## 2018-01-09 ENCOUNTER — OUTPATIENT (OUTPATIENT)
Dept: OUTPATIENT SERVICES | Facility: HOSPITAL | Age: 73
LOS: 1 days | Discharge: HOME | End: 2018-01-09

## 2018-01-09 VITALS
RESPIRATION RATE: 14 BRPM | TEMPERATURE: 98.8 F | DIASTOLIC BLOOD PRESSURE: 57 MMHG | WEIGHT: 143 LBS | BODY MASS INDEX: 26.31 KG/M2 | HEART RATE: 95 BPM | HEIGHT: 62 IN | SYSTOLIC BLOOD PRESSURE: 109 MMHG

## 2018-01-09 DIAGNOSIS — R06.2 WHEEZING: ICD-10-CM

## 2018-01-09 DIAGNOSIS — R05 COUGH: ICD-10-CM

## 2018-01-09 LAB
BASOPHILS # BLD: 0.05 TH/MM3
BASOPHILS NFR BLD: 0.7 %
EOSINOPHIL # BLD: 0.28 TH/MM3
EOSINOPHIL NFR BLD: 3.8 %
ERYTHROCYTE [DISTWIDTH] IN BLOOD BY AUTOMATED COUNT: 14.1 %
GRANULOCYTES # BLD: 4.38 TH/MM3
GRANULOCYTES NFR BLD: 59 %
HCT VFR BLD AUTO: 32.4 %
HGB BLD-MCNC: 10.6 G/DL
IMM GRANULOCYTES # BLD: 0.09 TH/MM3
IMM GRANULOCYTES NFR BLD: 1.2 %
LYMPHOCYTES # BLD: 1.49 TH/MM3
LYMPHOCYTES NFR BLD: 20.1 %
MCH RBC QN AUTO: 33.4 PG
MCHC RBC AUTO-ENTMCNC: 32.7 G/DL
MCV RBC AUTO: 102.2 FL
MONOCYTES # BLD: 1.13 TH/MM3
MONOCYTES NFR BLD: 15.2 %
PLATELET # BLD: 345 TH/MM3
PMV BLD AUTO: 10.2 FL
RBC # BLD AUTO: 3.17 MIL/MM3
WBC # BLD: 7.42 TH/MM3

## 2018-01-10 ENCOUNTER — RX RENEWAL (OUTPATIENT)
Age: 73
End: 2018-01-10

## 2018-01-12 ENCOUNTER — APPOINTMENT (OUTPATIENT)
Dept: INFUSION THERAPY | Facility: CLINIC | Age: 73
End: 2018-01-12

## 2018-01-14 LAB
BASOPHILS # BLD: 0.06 TH/MM3
BASOPHILS NFR BLD: 1.4 %
EOSINOPHIL # BLD: 0.3 TH/MM3
EOSINOPHIL NFR BLD: 6.8 %
ERYTHROCYTE [DISTWIDTH] IN BLOOD BY AUTOMATED COUNT: 14.4 %
GRANULOCYTES # BLD: 1.81 TH/MM3
GRANULOCYTES NFR BLD: 41.1 %
HCT VFR BLD AUTO: 30.4 %
HGB BLD-MCNC: 10.1 G/DL
IMM GRANULOCYTES # BLD: 0.05 TH/MM3
IMM GRANULOCYTES NFR BLD: 1.1 %
LYMPHOCYTES # BLD: 1.32 TH/MM3
LYMPHOCYTES NFR BLD: 29.9 %
MCH RBC QN AUTO: 34.1 PG
MCHC RBC AUTO-ENTMCNC: 33.2 G/DL
MCV RBC AUTO: 102.7 FL
MONOCYTES # BLD: 0.87 TH/MM3
MONOCYTES NFR BLD: 19.7 %
PLATELET # BLD: 335 TH/MM3
PMV BLD AUTO: 9.4 FL
RBC # BLD AUTO: 2.96 MIL/MM3
WBC # BLD: 4.41 TH/MM3

## 2018-01-17 ENCOUNTER — APPOINTMENT (OUTPATIENT)
Dept: INFUSION THERAPY | Facility: CLINIC | Age: 73
End: 2018-01-17

## 2018-01-22 ENCOUNTER — APPOINTMENT (OUTPATIENT)
Dept: HEMATOLOGY ONCOLOGY | Facility: CLINIC | Age: 73
End: 2018-01-22

## 2018-01-24 ENCOUNTER — APPOINTMENT (OUTPATIENT)
Dept: INFUSION THERAPY | Facility: CLINIC | Age: 73
End: 2018-01-24

## 2018-01-29 ENCOUNTER — APPOINTMENT (OUTPATIENT)
Dept: INFUSION THERAPY | Facility: CLINIC | Age: 73
End: 2018-01-29

## 2018-01-29 LAB
BASOPHILS # BLD: 0.09 TH/MM3
BASOPHILS NFR BLD: 2.6 %
EOSINOPHIL # BLD: 0.11 TH/MM3
EOSINOPHIL NFR BLD: 3.2 %
ERYTHROCYTE [DISTWIDTH] IN BLOOD BY AUTOMATED COUNT: 15 %
GRANULOCYTES # BLD: 1.62 TH/MM3
GRANULOCYTES NFR BLD: 47.7 %
HCT VFR BLD AUTO: 28.6 %
HGB BLD-MCNC: 9.1 G/DL
IMM GRANULOCYTES # BLD: 0.04 TH/MM3
IMM GRANULOCYTES NFR BLD: 1.2 %
LYMPHOCYTES # BLD: 1.05 TH/MM3
LYMPHOCYTES NFR BLD: 30.9 %
MCH RBC QN AUTO: 33.7 PG
MCHC RBC AUTO-ENTMCNC: 31.8 G/DL
MCV RBC AUTO: 105.9 FL
MONOCYTES # BLD: 0.49 TH/MM3
MONOCYTES NFR BLD: 14.4 %
PLATELET # BLD: 216 TH/MM3
PMV BLD AUTO: 9.6 FL
RBC # BLD AUTO: 2.7 MIL/MM3
WBC # BLD: 3.4 TH/MM3

## 2018-01-30 ENCOUNTER — RX RENEWAL (OUTPATIENT)
Age: 73
End: 2018-01-30

## 2018-02-05 ENCOUNTER — APPOINTMENT (OUTPATIENT)
Dept: INFUSION THERAPY | Facility: CLINIC | Age: 73
End: 2018-02-05

## 2018-02-06 ENCOUNTER — LABORATORY RESULT (OUTPATIENT)
Age: 73
End: 2018-02-06

## 2018-02-06 ENCOUNTER — APPOINTMENT (OUTPATIENT)
Dept: INFUSION THERAPY | Facility: CLINIC | Age: 73
End: 2018-02-06

## 2018-02-06 ENCOUNTER — APPOINTMENT (OUTPATIENT)
Dept: HEMATOLOGY ONCOLOGY | Facility: CLINIC | Age: 73
End: 2018-02-06

## 2018-02-06 VITALS
SYSTOLIC BLOOD PRESSURE: 140 MMHG | HEART RATE: 95 BPM | HEIGHT: 62 IN | DIASTOLIC BLOOD PRESSURE: 64 MMHG | WEIGHT: 144 LBS | RESPIRATION RATE: 14 BRPM | BODY MASS INDEX: 26.5 KG/M2 | TEMPERATURE: 98.8 F

## 2018-02-06 RX ORDER — ERYTHROPOIETIN 10000 [IU]/ML
30000 INJECTION, SOLUTION INTRAVENOUS; SUBCUTANEOUS ONCE
Qty: 0 | Refills: 0 | Status: COMPLETED | OUTPATIENT
Start: 2018-02-06 | End: 2018-02-06

## 2018-02-06 RX ADMIN — ERYTHROPOIETIN 30000 UNIT(S): 10000 INJECTION, SOLUTION INTRAVENOUS; SUBCUTANEOUS at 11:16

## 2018-02-07 LAB
HCT VFR BLD CALC: 27.8 %
HGB BLD-MCNC: 9.1 G/DL
MCHC RBC-ENTMCNC: 32.7 G/DL
MCHC RBC-ENTMCNC: 34.5 PG
MCV RBC AUTO: 105.3 FL
PLATELET # BLD AUTO: 244 K/UL
PMV BLD: 10.6 FL
RBC # BLD: 2.64 M/UL
RBC # FLD: 15.8 %
WBC # FLD AUTO: 3.72 K/UL

## 2018-02-12 ENCOUNTER — LABORATORY RESULT (OUTPATIENT)
Age: 73
End: 2018-02-12

## 2018-02-12 ENCOUNTER — APPOINTMENT (OUTPATIENT)
Dept: INFUSION THERAPY | Facility: CLINIC | Age: 73
End: 2018-02-12

## 2018-02-12 LAB
HCT VFR BLD CALC: 30.1 %
HGB BLD-MCNC: 9.6 G/DL
MCHC RBC-ENTMCNC: 31.9 G/DL
MCHC RBC-ENTMCNC: 33.9 PG
MCV RBC AUTO: 106.4 FL
PLATELET # BLD AUTO: 243 K/UL
PMV BLD: 10.1 FL
RBC # BLD: 2.83 M/UL
RBC # FLD: 16.2 %
WBC # FLD AUTO: 4.04 K/UL

## 2018-02-12 RX ORDER — ERYTHROPOIETIN 10000 [IU]/ML
30000 INJECTION, SOLUTION INTRAVENOUS; SUBCUTANEOUS ONCE
Qty: 0 | Refills: 0 | Status: COMPLETED | OUTPATIENT
Start: 2018-02-12 | End: 2018-02-12

## 2018-02-12 RX ADMIN — ERYTHROPOIETIN 30000 UNIT(S): 10000 INJECTION, SOLUTION INTRAVENOUS; SUBCUTANEOUS at 11:30

## 2018-02-20 ENCOUNTER — LABORATORY RESULT (OUTPATIENT)
Age: 73
End: 2018-02-20

## 2018-02-20 ENCOUNTER — APPOINTMENT (OUTPATIENT)
Dept: INFUSION THERAPY | Facility: CLINIC | Age: 73
End: 2018-02-20

## 2018-02-20 RX ORDER — ERYTHROPOIETIN 10000 [IU]/ML
30000 INJECTION, SOLUTION INTRAVENOUS; SUBCUTANEOUS ONCE
Qty: 0 | Refills: 0 | Status: COMPLETED | OUTPATIENT
Start: 2018-02-20 | End: 2018-02-20

## 2018-02-20 RX ADMIN — ERYTHROPOIETIN 30000 UNIT(S): 10000 INJECTION, SOLUTION INTRAVENOUS; SUBCUTANEOUS at 10:49

## 2018-02-26 ENCOUNTER — APPOINTMENT (OUTPATIENT)
Dept: HEMATOLOGY ONCOLOGY | Facility: CLINIC | Age: 73
End: 2018-02-26

## 2018-02-26 ENCOUNTER — LABORATORY RESULT (OUTPATIENT)
Age: 73
End: 2018-02-26

## 2018-02-26 ENCOUNTER — APPOINTMENT (OUTPATIENT)
Dept: INFUSION THERAPY | Facility: CLINIC | Age: 73
End: 2018-02-26

## 2018-02-26 VITALS
HEART RATE: 95 BPM | SYSTOLIC BLOOD PRESSURE: 123 MMHG | WEIGHT: 145 LBS | DIASTOLIC BLOOD PRESSURE: 60 MMHG | TEMPERATURE: 98.6 F | BODY MASS INDEX: 26.68 KG/M2 | HEIGHT: 62 IN

## 2018-02-26 VITALS — WEIGHT: 145.06 LBS | HEIGHT: 64 IN

## 2018-02-26 LAB
HCT VFR BLD CALC: 28.8 %
HCT VFR BLD CALC: 29.4 %
HGB BLD-MCNC: 9.5 G/DL
HGB BLD-MCNC: 9.6 G/DL
MCHC RBC-ENTMCNC: 32.7 G/DL
MCHC RBC-ENTMCNC: 33 G/DL
MCHC RBC-ENTMCNC: 34.7 PG
MCHC RBC-ENTMCNC: 35.3 PG
MCV RBC AUTO: 106.1 FL
MCV RBC AUTO: 107.1 FL
PLATELET # BLD AUTO: 178 K/UL
PLATELET # BLD AUTO: 219 K/UL
PMV BLD: 10.1 FL
PMV BLD: 10.3 FL
RBC # BLD: 2.69 M/UL
RBC # BLD: 2.77 M/UL
RBC # FLD: 15.8 %
RBC # FLD: 16.1 %
WBC # FLD AUTO: 2.58 K/UL
WBC # FLD AUTO: 2.62 K/UL

## 2018-02-26 RX ORDER — ERYTHROPOIETIN 10000 [IU]/ML
30000 INJECTION, SOLUTION INTRAVENOUS; SUBCUTANEOUS ONCE
Qty: 0 | Refills: 0 | Status: COMPLETED | OUTPATIENT
Start: 2018-02-26 | End: 2018-02-26

## 2018-02-26 RX ORDER — ERYTHROPOIETIN 10000 [IU]/ML
30000 INJECTION, SOLUTION INTRAVENOUS; SUBCUTANEOUS ONCE
Qty: 0 | Refills: 0 | Status: DISCONTINUED | OUTPATIENT
Start: 2018-02-26 | End: 2018-02-26

## 2018-02-26 RX ADMIN — ERYTHROPOIETIN 30000 UNIT(S): 10000 INJECTION, SOLUTION INTRAVENOUS; SUBCUTANEOUS at 13:39

## 2018-02-28 ENCOUNTER — RX RENEWAL (OUTPATIENT)
Age: 73
End: 2018-02-28

## 2018-03-05 ENCOUNTER — APPOINTMENT (OUTPATIENT)
Dept: INFUSION THERAPY | Facility: CLINIC | Age: 73
End: 2018-03-05

## 2018-03-06 ENCOUNTER — APPOINTMENT (OUTPATIENT)
Dept: INFUSION THERAPY | Facility: CLINIC | Age: 73
End: 2018-03-06

## 2018-03-12 ENCOUNTER — APPOINTMENT (OUTPATIENT)
Dept: INFUSION THERAPY | Facility: CLINIC | Age: 73
End: 2018-03-12

## 2018-03-13 ENCOUNTER — LABORATORY RESULT (OUTPATIENT)
Age: 73
End: 2018-03-13

## 2018-03-13 ENCOUNTER — APPOINTMENT (OUTPATIENT)
Dept: INFUSION THERAPY | Facility: CLINIC | Age: 73
End: 2018-03-13

## 2018-03-13 LAB
HCT VFR BLD CALC: 28.3 %
HGB BLD-MCNC: 9.2 G/DL
MCHC RBC-ENTMCNC: 32.5 G/DL
MCHC RBC-ENTMCNC: 35 PG
MCV RBC AUTO: 107.6 FL
PLATELET # BLD AUTO: 143 K/UL
PMV BLD: 11 FL
RBC # BLD: 2.63 M/UL
RBC # FLD: 14.6 %
WBC # FLD AUTO: 2.7 K/UL

## 2018-03-13 RX ORDER — ERYTHROPOIETIN 10000 [IU]/ML
30000 INJECTION, SOLUTION INTRAVENOUS; SUBCUTANEOUS ONCE
Qty: 0 | Refills: 0 | Status: COMPLETED | OUTPATIENT
Start: 2018-03-13 | End: 2018-03-13

## 2018-03-13 RX ADMIN — ERYTHROPOIETIN 30000 UNIT(S): 10000 INJECTION, SOLUTION INTRAVENOUS; SUBCUTANEOUS at 16:35

## 2018-03-23 ENCOUNTER — APPOINTMENT (OUTPATIENT)
Dept: INFUSION THERAPY | Facility: CLINIC | Age: 73
End: 2018-03-23

## 2018-03-23 ENCOUNTER — LABORATORY RESULT (OUTPATIENT)
Age: 73
End: 2018-03-23

## 2018-03-23 RX ORDER — ERYTHROPOIETIN 10000 [IU]/ML
30000 INJECTION, SOLUTION INTRAVENOUS; SUBCUTANEOUS ONCE
Qty: 0 | Refills: 0 | Status: COMPLETED | OUTPATIENT
Start: 2018-03-23 | End: 2018-03-23

## 2018-03-23 RX ADMIN — ERYTHROPOIETIN 30000 UNIT(S): 10000 INJECTION, SOLUTION INTRAVENOUS; SUBCUTANEOUS at 10:50

## 2018-03-26 ENCOUNTER — RX RENEWAL (OUTPATIENT)
Age: 73
End: 2018-03-26

## 2018-03-26 LAB
HCT VFR BLD CALC: 29.9 %
HGB BLD-MCNC: 9.6 G/DL
MCHC RBC-ENTMCNC: 32.1 G/DL
MCHC RBC-ENTMCNC: 34.3 PG
MCV RBC AUTO: 106.8 FL
PLATELET # BLD AUTO: 308 K/UL
PMV BLD: 10.2 FL
RBC # BLD: 2.8 M/UL
RBC # FLD: 14.4 %
WBC # FLD AUTO: 3.85 K/UL

## 2018-03-29 ENCOUNTER — APPOINTMENT (OUTPATIENT)
Dept: HEMATOLOGY ONCOLOGY | Facility: CLINIC | Age: 73
End: 2018-03-29

## 2018-03-29 ENCOUNTER — LABORATORY RESULT (OUTPATIENT)
Age: 73
End: 2018-03-29

## 2018-03-29 ENCOUNTER — OUTPATIENT (OUTPATIENT)
Dept: OUTPATIENT SERVICES | Facility: HOSPITAL | Age: 73
LOS: 1 days | Discharge: HOME | End: 2018-03-29

## 2018-03-29 ENCOUNTER — APPOINTMENT (OUTPATIENT)
Dept: INFUSION THERAPY | Facility: CLINIC | Age: 73
End: 2018-03-29

## 2018-03-29 VITALS
HEIGHT: 62 IN | HEART RATE: 81 BPM | BODY MASS INDEX: 26.5 KG/M2 | RESPIRATION RATE: 14 BRPM | SYSTOLIC BLOOD PRESSURE: 139 MMHG | WEIGHT: 144 LBS | TEMPERATURE: 98.5 F | DIASTOLIC BLOOD PRESSURE: 71 MMHG

## 2018-03-29 DIAGNOSIS — D46.9 MYELODYSPLASTIC SYNDROME, UNSPECIFIED: ICD-10-CM

## 2018-03-29 DIAGNOSIS — R05 COUGH: ICD-10-CM

## 2018-03-29 DIAGNOSIS — R19.7 DIARRHEA, UNSPECIFIED: ICD-10-CM

## 2018-03-29 LAB
HCT VFR BLD CALC: 29.5 %
HGB BLD-MCNC: 9.6 G/DL
MCHC RBC-ENTMCNC: 32.5 G/DL
MCHC RBC-ENTMCNC: 34.7 PG
MCV RBC AUTO: 106.5 FL
PLATELET # BLD AUTO: 344 K/UL
PMV BLD: 9.7 FL
RBC # BLD: 2.77 M/UL
RBC # FLD: 14.4 %
WBC # FLD AUTO: 5.76 K/UL

## 2018-03-29 RX ORDER — ERYTHROPOIETIN 10000 [IU]/ML
30000 INJECTION, SOLUTION INTRAVENOUS; SUBCUTANEOUS ONCE
Qty: 0 | Refills: 0 | Status: COMPLETED | OUTPATIENT
Start: 2018-03-29 | End: 2018-03-29

## 2018-03-29 RX ADMIN — ERYTHROPOIETIN 30000 UNIT(S): 10000 INJECTION, SOLUTION INTRAVENOUS; SUBCUTANEOUS at 14:44

## 2018-03-30 LAB
ALBUMIN SERPL ELPH-MCNC: 4.5 G/DL
ALP BLD-CCNC: 71 U/L
ALT SERPL-CCNC: 23 U/L
ANION GAP SERPL CALC-SCNC: 16 MMOL/L
AST SERPL-CCNC: 14 U/L
BILIRUB SERPL-MCNC: 0.3 MG/DL
BUN SERPL-MCNC: 25 MG/DL
CALCIUM SERPL-MCNC: 9.3 MG/DL
CHLORIDE SERPL-SCNC: 104 MMOL/L
CO2 SERPL-SCNC: 20 MMOL/L
CREAT SERPL-MCNC: 1.5 MG/DL
FERRITIN SERPL-MCNC: 606 NG/ML
GLUCOSE SERPL-MCNC: 145 MG/DL
POTASSIUM SERPL-SCNC: 4.7 MMOL/L
PROT SERPL-MCNC: 6.9 G/DL
SODIUM SERPL-SCNC: 140 MMOL/L

## 2018-04-03 ENCOUNTER — APPOINTMENT (OUTPATIENT)
Dept: INFUSION THERAPY | Facility: CLINIC | Age: 73
End: 2018-04-03

## 2018-04-10 ENCOUNTER — APPOINTMENT (OUTPATIENT)
Dept: INFUSION THERAPY | Facility: CLINIC | Age: 73
End: 2018-04-10

## 2018-04-10 ENCOUNTER — LABORATORY RESULT (OUTPATIENT)
Age: 73
End: 2018-04-10

## 2018-04-10 RX ORDER — ERYTHROPOIETIN 10000 [IU]/ML
30000 INJECTION, SOLUTION INTRAVENOUS; SUBCUTANEOUS ONCE
Qty: 0 | Refills: 0 | Status: COMPLETED | OUTPATIENT
Start: 2018-04-10 | End: 2018-04-10

## 2018-04-10 RX ADMIN — ERYTHROPOIETIN 30000 UNIT(S): 10000 INJECTION, SOLUTION INTRAVENOUS; SUBCUTANEOUS at 16:57

## 2018-04-11 LAB
HCT VFR BLD CALC: 28.4 %
HGB BLD-MCNC: 9.2 G/DL
MCHC RBC-ENTMCNC: 32.4 G/DL
MCHC RBC-ENTMCNC: 35 PG
MCV RBC AUTO: 108 FL
PLATELET # BLD AUTO: 175 K/UL
PMV BLD: 10.8 FL
RBC # BLD: 2.63 M/UL
RBC # FLD: 14.6 %
WBC # FLD AUTO: 2.8 K/UL

## 2018-04-17 ENCOUNTER — APPOINTMENT (OUTPATIENT)
Dept: INFUSION THERAPY | Facility: CLINIC | Age: 73
End: 2018-04-17

## 2018-04-24 ENCOUNTER — APPOINTMENT (OUTPATIENT)
Dept: INFUSION THERAPY | Facility: CLINIC | Age: 73
End: 2018-04-24

## 2018-04-24 ENCOUNTER — LABORATORY RESULT (OUTPATIENT)
Age: 73
End: 2018-04-24

## 2018-04-24 ENCOUNTER — APPOINTMENT (OUTPATIENT)
Dept: HEMATOLOGY ONCOLOGY | Facility: CLINIC | Age: 73
End: 2018-04-24

## 2018-04-24 VITALS
SYSTOLIC BLOOD PRESSURE: 137 MMHG | HEART RATE: 77 BPM | BODY MASS INDEX: 26.87 KG/M2 | TEMPERATURE: 98.3 F | WEIGHT: 146 LBS | DIASTOLIC BLOOD PRESSURE: 73 MMHG | HEIGHT: 62 IN

## 2018-04-24 LAB
HCT VFR BLD CALC: 27.9 %
HGB BLD-MCNC: 9.2 G/DL
MCHC RBC-ENTMCNC: 33 G/DL
MCHC RBC-ENTMCNC: 35.1 PG
MCV RBC AUTO: 106.5 FL
PLATELET # BLD AUTO: 200 K/UL
PMV BLD: 10.3 FL
RBC # BLD: 2.62 M/UL
RBC # FLD: 14 %
WBC # FLD AUTO: 3.72 K/UL

## 2018-04-24 RX ORDER — ERYTHROPOIETIN 10000 [IU]/ML
30000 INJECTION, SOLUTION INTRAVENOUS; SUBCUTANEOUS ONCE
Qty: 0 | Refills: 0 | Status: COMPLETED | OUTPATIENT
Start: 2018-04-24 | End: 2018-04-24

## 2018-04-24 RX ADMIN — ERYTHROPOIETIN 30000 UNIT(S): 10000 INJECTION, SOLUTION INTRAVENOUS; SUBCUTANEOUS at 12:24

## 2018-05-01 ENCOUNTER — APPOINTMENT (OUTPATIENT)
Dept: INFUSION THERAPY | Facility: CLINIC | Age: 73
End: 2018-05-01

## 2018-05-08 ENCOUNTER — APPOINTMENT (OUTPATIENT)
Dept: INFUSION THERAPY | Facility: CLINIC | Age: 73
End: 2018-05-08

## 2018-05-11 ENCOUNTER — LABORATORY RESULT (OUTPATIENT)
Age: 73
End: 2018-05-11

## 2018-05-11 ENCOUNTER — APPOINTMENT (OUTPATIENT)
Dept: INFUSION THERAPY | Facility: CLINIC | Age: 73
End: 2018-05-11

## 2018-05-11 RX ORDER — ERYTHROPOIETIN 10000 [IU]/ML
30000 INJECTION, SOLUTION INTRAVENOUS; SUBCUTANEOUS ONCE
Qty: 0 | Refills: 0 | Status: COMPLETED | OUTPATIENT
Start: 2018-05-11 | End: 2018-05-11

## 2018-05-11 RX ADMIN — ERYTHROPOIETIN 30000 UNIT(S): 10000 INJECTION, SOLUTION INTRAVENOUS; SUBCUTANEOUS at 16:29

## 2018-05-12 LAB
HCT VFR BLD CALC: 27 %
HGB BLD-MCNC: 9.2 G/DL
MCHC RBC-ENTMCNC: 34.1 G/DL
MCHC RBC-ENTMCNC: 35.4 PG
MCV RBC AUTO: 103.8 FL
PLATELET # BLD AUTO: 241 K/UL
PMV BLD: 10.2 FL
RBC # BLD: 2.6 M/UL
RBC # FLD: 13.9 %
WBC # FLD AUTO: 3.36 K/UL

## 2018-05-16 ENCOUNTER — RX RENEWAL (OUTPATIENT)
Age: 73
End: 2018-05-16

## 2018-05-17 ENCOUNTER — RX RENEWAL (OUTPATIENT)
Age: 73
End: 2018-05-17

## 2018-05-17 ENCOUNTER — LABORATORY RESULT (OUTPATIENT)
Age: 73
End: 2018-05-17

## 2018-05-17 ENCOUNTER — APPOINTMENT (OUTPATIENT)
Dept: INFUSION THERAPY | Facility: CLINIC | Age: 73
End: 2018-05-17

## 2018-05-17 RX ORDER — ERYTHROPOIETIN 10000 [IU]/ML
30000 INJECTION, SOLUTION INTRAVENOUS; SUBCUTANEOUS ONCE
Qty: 0 | Refills: 0 | Status: COMPLETED | OUTPATIENT
Start: 2018-05-17 | End: 2018-05-17

## 2018-05-17 RX ORDER — ERYTHROPOIETIN 10000 [IU]/ML
30000 INJECTION, SOLUTION INTRAVENOUS; SUBCUTANEOUS ONCE
Qty: 0 | Refills: 0 | Status: DISCONTINUED | OUTPATIENT
Start: 2018-05-17 | End: 2018-05-17

## 2018-05-17 RX ADMIN — ERYTHROPOIETIN 30000 UNIT(S): 10000 INJECTION, SOLUTION INTRAVENOUS; SUBCUTANEOUS at 10:33

## 2018-05-18 LAB
HCT VFR BLD CALC: 29 %
HGB BLD-MCNC: 9.5 G/DL
MCHC RBC-ENTMCNC: 32.8 G/DL
MCHC RBC-ENTMCNC: 34.5 PG
MCV RBC AUTO: 105.5 FL
PLATELET # BLD AUTO: 281 K/UL
PMV BLD: 9.4 FL
RBC # BLD: 2.75 M/UL
RBC # FLD: 14.3 %
WBC # FLD AUTO: 3.01 K/UL

## 2018-05-22 ENCOUNTER — APPOINTMENT (OUTPATIENT)
Dept: HEMATOLOGY ONCOLOGY | Facility: CLINIC | Age: 73
End: 2018-05-22

## 2018-05-22 ENCOUNTER — LABORATORY RESULT (OUTPATIENT)
Age: 73
End: 2018-05-22

## 2018-05-22 ENCOUNTER — APPOINTMENT (OUTPATIENT)
Dept: INFUSION THERAPY | Facility: CLINIC | Age: 73
End: 2018-05-22

## 2018-05-22 VITALS
BODY MASS INDEX: 26.31 KG/M2 | WEIGHT: 143 LBS | HEIGHT: 62 IN | HEART RATE: 97 BPM | TEMPERATURE: 97.2 F | SYSTOLIC BLOOD PRESSURE: 151 MMHG | DIASTOLIC BLOOD PRESSURE: 77 MMHG | RESPIRATION RATE: 16 BRPM

## 2018-05-22 LAB
HCT VFR BLD CALC: 29.8 %
HGB BLD-MCNC: 9.7 G/DL
MCHC RBC-ENTMCNC: 32.6 G/DL
MCHC RBC-ENTMCNC: 34.6 PG
MCV RBC AUTO: 106.4 FL
PLATELET # BLD AUTO: 309 K/UL
PMV BLD: 10.1 FL
RBC # BLD: 2.8 M/UL
RBC # FLD: 14.5 %
WBC # FLD AUTO: 4.01 K/UL

## 2018-05-22 RX ORDER — ERYTHROPOIETIN 10000 [IU]/ML
30000 INJECTION, SOLUTION INTRAVENOUS; SUBCUTANEOUS ONCE
Qty: 0 | Refills: 0 | Status: COMPLETED | OUTPATIENT
Start: 2018-05-22 | End: 2018-05-22

## 2018-05-22 RX ADMIN — ERYTHROPOIETIN 30000 UNIT(S): 10000 INJECTION, SOLUTION INTRAVENOUS; SUBCUTANEOUS at 12:31

## 2018-05-29 ENCOUNTER — APPOINTMENT (OUTPATIENT)
Dept: INFUSION THERAPY | Facility: CLINIC | Age: 73
End: 2018-05-29

## 2018-06-05 ENCOUNTER — APPOINTMENT (OUTPATIENT)
Dept: HEMATOLOGY ONCOLOGY | Facility: CLINIC | Age: 73
End: 2018-06-05

## 2018-06-06 ENCOUNTER — LABORATORY RESULT (OUTPATIENT)
Age: 73
End: 2018-06-06

## 2018-06-06 ENCOUNTER — APPOINTMENT (OUTPATIENT)
Dept: INFUSION THERAPY | Facility: CLINIC | Age: 73
End: 2018-06-06

## 2018-06-06 LAB
HCT VFR BLD CALC: 26.8 %
HGB BLD-MCNC: 8.8 G/DL
MCHC RBC-ENTMCNC: 32.8 G/DL
MCHC RBC-ENTMCNC: 35.2 PG
MCV RBC AUTO: 107.2 FL
PLATELET # BLD AUTO: 225 K/UL
PMV BLD: 10.1 FL
RBC # BLD: 2.5 M/UL
RBC # FLD: 14.6 %
WBC # FLD AUTO: 2.09 K/UL

## 2018-06-06 RX ORDER — ERYTHROPOIETIN 10000 [IU]/ML
30000 INJECTION, SOLUTION INTRAVENOUS; SUBCUTANEOUS ONCE
Qty: 0 | Refills: 0 | Status: DISCONTINUED | OUTPATIENT
Start: 2018-06-06 | End: 2018-06-06

## 2018-06-06 RX ORDER — ERYTHROPOIETIN 10000 [IU]/ML
30000 INJECTION, SOLUTION INTRAVENOUS; SUBCUTANEOUS ONCE
Qty: 0 | Refills: 0 | Status: COMPLETED | OUTPATIENT
Start: 2018-06-06 | End: 2018-06-06

## 2018-06-06 RX ADMIN — ERYTHROPOIETIN 30000 UNIT(S): 10000 INJECTION, SOLUTION INTRAVENOUS; SUBCUTANEOUS at 10:54

## 2018-06-07 LAB
ALBUMIN SERPL ELPH-MCNC: 4.1 G/DL
ALP BLD-CCNC: 60 U/L
ALT SERPL-CCNC: 20 U/L
ANION GAP SERPL CALC-SCNC: 20 MMOL/L
AST SERPL-CCNC: 16 U/L
BILIRUB SERPL-MCNC: 0.4 MG/DL
BUN SERPL-MCNC: 24 MG/DL
CALCIUM SERPL-MCNC: 9 MG/DL
CHLORIDE SERPL-SCNC: 98 MMOL/L
CO2 SERPL-SCNC: 19 MMOL/L
CREAT SERPL-MCNC: 1.3 MG/DL
GLUCOSE SERPL-MCNC: 304 MG/DL
POTASSIUM SERPL-SCNC: 5 MMOL/L
PROT SERPL-MCNC: 6.3 G/DL
SODIUM SERPL-SCNC: 137 MMOL/L

## 2018-06-12 ENCOUNTER — APPOINTMENT (OUTPATIENT)
Dept: INFUSION THERAPY | Facility: CLINIC | Age: 73
End: 2018-06-12

## 2018-06-12 ENCOUNTER — APPOINTMENT (OUTPATIENT)
Dept: HEMATOLOGY ONCOLOGY | Facility: CLINIC | Age: 73
End: 2018-06-12

## 2018-06-12 ENCOUNTER — RX RENEWAL (OUTPATIENT)
Age: 73
End: 2018-06-12

## 2018-06-19 ENCOUNTER — APPOINTMENT (OUTPATIENT)
Dept: INFUSION THERAPY | Facility: CLINIC | Age: 73
End: 2018-06-19

## 2018-06-19 ENCOUNTER — APPOINTMENT (OUTPATIENT)
Dept: HEMATOLOGY ONCOLOGY | Facility: CLINIC | Age: 73
End: 2018-06-19

## 2018-06-19 ENCOUNTER — LABORATORY RESULT (OUTPATIENT)
Age: 73
End: 2018-06-19

## 2018-06-19 VITALS
HEART RATE: 85 BPM | RESPIRATION RATE: 16 BRPM | SYSTOLIC BLOOD PRESSURE: 139 MMHG | DIASTOLIC BLOOD PRESSURE: 62 MMHG | BODY MASS INDEX: 27.05 KG/M2 | WEIGHT: 147 LBS | TEMPERATURE: 99.2 F | HEIGHT: 62 IN

## 2018-06-19 LAB
HCT VFR BLD CALC: 26.4 %
HGB BLD-MCNC: 8.7 G/DL
MCHC RBC-ENTMCNC: 33 G/DL
MCHC RBC-ENTMCNC: 35.1 PG
MCV RBC AUTO: 106.5 FL
PLATELET # BLD AUTO: 263 K/UL
PMV BLD: 10.7 FL
RBC # BLD: 2.48 M/UL
RBC # FLD: 14.8 %
WBC # FLD AUTO: 3.54 K/UL

## 2018-06-19 RX ORDER — ERYTHROPOIETIN 10000 [IU]/ML
30000 INJECTION, SOLUTION INTRAVENOUS; SUBCUTANEOUS ONCE
Qty: 0 | Refills: 0 | Status: COMPLETED | OUTPATIENT
Start: 2018-06-19 | End: 2018-06-19

## 2018-06-19 RX ADMIN — ERYTHROPOIETIN 30000 UNIT(S): 10000 INJECTION, SOLUTION INTRAVENOUS; SUBCUTANEOUS at 10:41

## 2018-06-26 ENCOUNTER — APPOINTMENT (OUTPATIENT)
Dept: HEMATOLOGY ONCOLOGY | Facility: CLINIC | Age: 73
End: 2018-06-26

## 2018-07-02 ENCOUNTER — APPOINTMENT (OUTPATIENT)
Dept: INFUSION THERAPY | Facility: CLINIC | Age: 73
End: 2018-07-02

## 2018-07-02 ENCOUNTER — LABORATORY RESULT (OUTPATIENT)
Age: 73
End: 2018-07-02

## 2018-07-02 LAB
HCT VFR BLD CALC: 27.4 %
HGB BLD-MCNC: 8.8 G/DL
MCHC RBC-ENTMCNC: 32.1 G/DL
MCHC RBC-ENTMCNC: 34.8 PG
MCV RBC AUTO: 108.3 FL
PLATELET # BLD AUTO: 190 K/UL
PMV BLD: 10.4 FL
RBC # BLD: 2.53 M/UL
RBC # FLD: 14.6 %
WBC # FLD AUTO: 3.69 K/UL

## 2018-07-02 RX ORDER — ERYTHROPOIETIN 10000 [IU]/ML
30000 INJECTION, SOLUTION INTRAVENOUS; SUBCUTANEOUS ONCE
Qty: 0 | Refills: 0 | Status: COMPLETED | OUTPATIENT
Start: 2018-07-02 | End: 2018-07-02

## 2018-07-02 RX ADMIN — ERYTHROPOIETIN 30000 UNIT(S): 10000 INJECTION, SOLUTION INTRAVENOUS; SUBCUTANEOUS at 09:57

## 2018-07-03 ENCOUNTER — APPOINTMENT (OUTPATIENT)
Dept: INFUSION THERAPY | Facility: CLINIC | Age: 73
End: 2018-07-03

## 2018-07-03 ENCOUNTER — RX RENEWAL (OUTPATIENT)
Age: 73
End: 2018-07-03

## 2018-07-05 ENCOUNTER — RX RENEWAL (OUTPATIENT)
Age: 73
End: 2018-07-05

## 2018-07-10 ENCOUNTER — APPOINTMENT (OUTPATIENT)
Dept: HEMATOLOGY ONCOLOGY | Facility: CLINIC | Age: 73
End: 2018-07-10

## 2018-07-10 ENCOUNTER — APPOINTMENT (OUTPATIENT)
Dept: INFUSION THERAPY | Facility: CLINIC | Age: 73
End: 2018-07-10

## 2018-07-16 ENCOUNTER — RX RENEWAL (OUTPATIENT)
Age: 73
End: 2018-07-16

## 2018-07-17 ENCOUNTER — APPOINTMENT (OUTPATIENT)
Dept: INFUSION THERAPY | Facility: CLINIC | Age: 73
End: 2018-07-17

## 2018-07-17 ENCOUNTER — LABORATORY RESULT (OUTPATIENT)
Age: 73
End: 2018-07-17

## 2018-07-17 RX ORDER — ERYTHROPOIETIN 10000 [IU]/ML
30000 INJECTION, SOLUTION INTRAVENOUS; SUBCUTANEOUS ONCE
Qty: 0 | Refills: 0 | Status: COMPLETED | OUTPATIENT
Start: 2018-07-17 | End: 2018-07-17

## 2018-07-17 RX ADMIN — ERYTHROPOIETIN 30000 UNIT(S): 10000 INJECTION, SOLUTION INTRAVENOUS; SUBCUTANEOUS at 16:41

## 2018-07-19 LAB
HCT VFR BLD CALC: 24.8 %
HGB BLD-MCNC: 8.2 G/DL
MCHC RBC-ENTMCNC: 33.1 G/DL
MCHC RBC-ENTMCNC: 34.9 PG
MCV RBC AUTO: 105.5 FL
PLATELET # BLD AUTO: 314 K/UL
PMV BLD: 9.8 FL
RBC # BLD: 2.35 M/UL
RBC # FLD: 14.6 %
WBC # FLD AUTO: 3.85 K/UL

## 2018-07-24 ENCOUNTER — APPOINTMENT (OUTPATIENT)
Dept: INFUSION THERAPY | Facility: CLINIC | Age: 73
End: 2018-07-24

## 2018-07-31 ENCOUNTER — APPOINTMENT (OUTPATIENT)
Dept: HEMATOLOGY ONCOLOGY | Facility: CLINIC | Age: 73
End: 2018-07-31

## 2018-07-31 ENCOUNTER — LABORATORY RESULT (OUTPATIENT)
Age: 73
End: 2018-07-31

## 2018-07-31 ENCOUNTER — APPOINTMENT (OUTPATIENT)
Dept: INFUSION THERAPY | Facility: CLINIC | Age: 73
End: 2018-07-31

## 2018-07-31 VITALS
HEART RATE: 90 BPM | SYSTOLIC BLOOD PRESSURE: 123 MMHG | DIASTOLIC BLOOD PRESSURE: 56 MMHG | HEIGHT: 62 IN | TEMPERATURE: 99.5 F | BODY MASS INDEX: 27.23 KG/M2 | RESPIRATION RATE: 16 BRPM | WEIGHT: 148 LBS

## 2018-07-31 LAB
HCT VFR BLD CALC: 24.2 %
HGB BLD-MCNC: 8.1 G/DL
MCHC RBC-ENTMCNC: 33.5 G/DL
MCHC RBC-ENTMCNC: 35.8 PG
MCV RBC AUTO: 107.1 FL
PLATELET # BLD AUTO: 228 K/UL
PMV BLD: 10.5 FL
RBC # BLD: 2.26 M/UL
RBC # FLD: 14.7 %
WBC # FLD AUTO: 3.34 K/UL

## 2018-07-31 RX ORDER — ERYTHROPOIETIN 10000 [IU]/ML
30000 INJECTION, SOLUTION INTRAVENOUS; SUBCUTANEOUS ONCE
Qty: 0 | Refills: 0 | Status: DISCONTINUED | OUTPATIENT
Start: 2018-07-31 | End: 2018-07-31

## 2018-07-31 RX ORDER — ERYTHROPOIETIN 10000 [IU]/ML
30000 INJECTION, SOLUTION INTRAVENOUS; SUBCUTANEOUS ONCE
Qty: 0 | Refills: 0 | Status: COMPLETED | OUTPATIENT
Start: 2018-07-31 | End: 2018-07-31

## 2018-07-31 RX ADMIN — ERYTHROPOIETIN 30000 UNIT(S): 10000 INJECTION, SOLUTION INTRAVENOUS; SUBCUTANEOUS at 11:09

## 2018-08-07 ENCOUNTER — APPOINTMENT (OUTPATIENT)
Dept: INFUSION THERAPY | Facility: CLINIC | Age: 73
End: 2018-08-07

## 2018-08-07 ENCOUNTER — LABORATORY RESULT (OUTPATIENT)
Age: 73
End: 2018-08-07

## 2018-08-07 ENCOUNTER — APPOINTMENT (OUTPATIENT)
Dept: HEMATOLOGY ONCOLOGY | Facility: CLINIC | Age: 73
End: 2018-08-07

## 2018-08-07 RX ORDER — ERYTHROPOIETIN 10000 [IU]/ML
30000 INJECTION, SOLUTION INTRAVENOUS; SUBCUTANEOUS ONCE
Qty: 0 | Refills: 0 | Status: COMPLETED | OUTPATIENT
Start: 2018-08-07 | End: 2018-08-07

## 2018-08-07 RX ADMIN — ERYTHROPOIETIN 30000 UNIT(S): 10000 INJECTION, SOLUTION INTRAVENOUS; SUBCUTANEOUS at 16:47

## 2018-08-08 LAB
HCT VFR BLD CALC: 24.5 %
HGB BLD-MCNC: 8.1 G/DL
MCHC RBC-ENTMCNC: 33.1 G/DL
MCHC RBC-ENTMCNC: 36.2 PG
MCV RBC AUTO: 109.4 FL
PLATELET # BLD AUTO: 327 K/UL
PMV BLD: 10.1 FL
RBC # BLD: 2.24 M/UL
RBC # FLD: 14.7 %
WBC # FLD AUTO: 3.86 K/UL

## 2018-08-14 ENCOUNTER — RX RENEWAL (OUTPATIENT)
Age: 73
End: 2018-08-14

## 2018-08-16 ENCOUNTER — RX RENEWAL (OUTPATIENT)
Age: 73
End: 2018-08-16

## 2018-08-17 ENCOUNTER — APPOINTMENT (OUTPATIENT)
Dept: HEMATOLOGY ONCOLOGY | Facility: CLINIC | Age: 73
End: 2018-08-17

## 2018-08-17 ENCOUNTER — OUTPATIENT (OUTPATIENT)
Dept: OUTPATIENT SERVICES | Facility: HOSPITAL | Age: 73
LOS: 1 days | Discharge: HOME | End: 2018-08-17

## 2018-08-17 ENCOUNTER — LABORATORY RESULT (OUTPATIENT)
Age: 73
End: 2018-08-17

## 2018-08-17 ENCOUNTER — APPOINTMENT (OUTPATIENT)
Dept: INFUSION THERAPY | Facility: CLINIC | Age: 73
End: 2018-08-17

## 2018-08-17 DIAGNOSIS — D46.9 MYELODYSPLASTIC SYNDROME, UNSPECIFIED: ICD-10-CM

## 2018-08-17 RX ORDER — ERYTHROPOIETIN 10000 [IU]/ML
30000 INJECTION, SOLUTION INTRAVENOUS; SUBCUTANEOUS ONCE
Qty: 0 | Refills: 0 | Status: COMPLETED | OUTPATIENT
Start: 2018-08-17 | End: 2018-08-17

## 2018-08-17 RX ADMIN — ERYTHROPOIETIN 30000 UNIT(S): 10000 INJECTION, SOLUTION INTRAVENOUS; SUBCUTANEOUS at 16:33

## 2018-09-01 LAB
HCT VFR BLD CALC: 24.1 %
HGB BLD-MCNC: 8.1 G/DL
MCHC RBC-ENTMCNC: 33.6 G/DL
MCHC RBC-ENTMCNC: 36.3 PG
MCV RBC AUTO: 108.1 FL
PLATELET # BLD AUTO: 292 K/UL
PMV BLD: 10.6 FL
RBC # BLD: 2.23 M/UL
RBC # FLD: 14.6 %
WBC # FLD AUTO: 3.59 K/UL

## 2018-09-04 ENCOUNTER — APPOINTMENT (OUTPATIENT)
Dept: INFUSION THERAPY | Facility: CLINIC | Age: 73
End: 2018-09-04

## 2018-09-04 ENCOUNTER — APPOINTMENT (OUTPATIENT)
Dept: HEMATOLOGY ONCOLOGY | Facility: CLINIC | Age: 73
End: 2018-09-04

## 2018-09-11 ENCOUNTER — APPOINTMENT (OUTPATIENT)
Dept: INFUSION THERAPY | Facility: CLINIC | Age: 73
End: 2018-09-11

## 2018-09-11 ENCOUNTER — APPOINTMENT (OUTPATIENT)
Dept: HEMATOLOGY ONCOLOGY | Facility: CLINIC | Age: 73
End: 2018-09-11

## 2018-09-11 ENCOUNTER — LABORATORY RESULT (OUTPATIENT)
Age: 73
End: 2018-09-11

## 2018-09-11 VITALS
HEIGHT: 62 IN | BODY MASS INDEX: 26.5 KG/M2 | RESPIRATION RATE: 16 BRPM | WEIGHT: 144 LBS | HEART RATE: 79 BPM | SYSTOLIC BLOOD PRESSURE: 120 MMHG | DIASTOLIC BLOOD PRESSURE: 49 MMHG | TEMPERATURE: 97.9 F

## 2018-09-11 LAB
HCT VFR BLD CALC: 25.6 %
HGB BLD-MCNC: 8.6 G/DL
MCHC RBC-ENTMCNC: 33.6 G/DL
MCHC RBC-ENTMCNC: 35.7 PG
MCV RBC AUTO: 106.2 FL
PLATELET # BLD AUTO: 260 K/UL
PMV BLD: 9.6 FL
RBC # BLD: 2.41 M/UL
RBC # FLD: 13.2 %
WBC # FLD AUTO: 4.23 K/UL

## 2018-09-11 RX ORDER — CHOLESTYRAMINE 4 G/9G
4 POWDER, FOR SUSPENSION ORAL
Qty: 60 | Refills: 0 | Status: DISCONTINUED | COMMUNITY
Start: 2017-11-14 | End: 2018-09-11

## 2018-09-11 RX ORDER — ERYTHROPOIETIN 10000 [IU]/ML
30000 INJECTION, SOLUTION INTRAVENOUS; SUBCUTANEOUS ONCE
Qty: 0 | Refills: 0 | Status: COMPLETED | OUTPATIENT
Start: 2018-09-11 | End: 2018-09-11

## 2018-09-11 RX ADMIN — ERYTHROPOIETIN 30000 UNIT(S): 10000 INJECTION, SOLUTION INTRAVENOUS; SUBCUTANEOUS at 12:22

## 2018-09-18 ENCOUNTER — APPOINTMENT (OUTPATIENT)
Dept: INFUSION THERAPY | Facility: CLINIC | Age: 73
End: 2018-09-18

## 2018-09-18 ENCOUNTER — APPOINTMENT (OUTPATIENT)
Dept: HEMATOLOGY ONCOLOGY | Facility: CLINIC | Age: 73
End: 2018-09-18

## 2018-09-24 ENCOUNTER — RX RENEWAL (OUTPATIENT)
Age: 73
End: 2018-09-24

## 2018-09-25 ENCOUNTER — APPOINTMENT (OUTPATIENT)
Dept: INFUSION THERAPY | Facility: CLINIC | Age: 73
End: 2018-09-25

## 2018-09-25 ENCOUNTER — LABORATORY RESULT (OUTPATIENT)
Age: 73
End: 2018-09-25

## 2018-09-25 LAB
ABO + RH PNL BLD: NORMAL
BLD GP AB SCN SERPL QL: NORMAL
HCT VFR BLD CALC: 23 %
HGB BLD-MCNC: 7.5 G/DL
MCHC RBC-ENTMCNC: 32.6 G/DL
MCHC RBC-ENTMCNC: 35.5 PG
MCV RBC AUTO: 109 FL
PLATELET # BLD AUTO: 222 K/UL
PMV BLD: 10.1 FL
RBC # BLD: 2.11 M/UL
RBC # FLD: 14.2 %
WBC # FLD AUTO: 3.27 K/UL

## 2018-09-25 RX ORDER — ERYTHROPOIETIN 10000 [IU]/ML
30000 INJECTION, SOLUTION INTRAVENOUS; SUBCUTANEOUS ONCE
Qty: 0 | Refills: 0 | Status: COMPLETED | OUTPATIENT
Start: 2018-09-25 | End: 2018-09-25

## 2018-09-25 RX ADMIN — ERYTHROPOIETIN 30000 UNIT(S): 10000 INJECTION, SOLUTION INTRAVENOUS; SUBCUTANEOUS at 10:06

## 2018-09-26 ENCOUNTER — APPOINTMENT (OUTPATIENT)
Dept: INFUSION THERAPY | Facility: CLINIC | Age: 73
End: 2018-09-26

## 2018-09-27 ENCOUNTER — RX RENEWAL (OUTPATIENT)
Age: 73
End: 2018-09-27

## 2018-10-02 ENCOUNTER — APPOINTMENT (OUTPATIENT)
Dept: HEMATOLOGY ONCOLOGY | Facility: CLINIC | Age: 73
End: 2018-10-02

## 2018-10-02 ENCOUNTER — APPOINTMENT (OUTPATIENT)
Dept: INFUSION THERAPY | Facility: CLINIC | Age: 73
End: 2018-10-02

## 2018-10-02 ENCOUNTER — LABORATORY RESULT (OUTPATIENT)
Age: 73
End: 2018-10-02

## 2018-10-02 VITALS
HEIGHT: 62 IN | TEMPERATURE: 97.8 F | DIASTOLIC BLOOD PRESSURE: 49 MMHG | WEIGHT: 145 LBS | HEART RATE: 69 BPM | SYSTOLIC BLOOD PRESSURE: 103 MMHG | BODY MASS INDEX: 26.68 KG/M2

## 2018-10-02 LAB
ALBUMIN SERPL ELPH-MCNC: 4.5 G/DL
ALP BLD-CCNC: 79 U/L
ALT SERPL-CCNC: 17 U/L
ANION GAP SERPL CALC-SCNC: 13 MMOL/L
AST SERPL-CCNC: 14 U/L
BILIRUB SERPL-MCNC: 0.5 MG/DL
BUN SERPL-MCNC: 23 MG/DL
CALCIUM SERPL-MCNC: 9.5 MG/DL
CHLORIDE SERPL-SCNC: 104 MMOL/L
CO2 SERPL-SCNC: 20 MMOL/L
CREAT SERPL-MCNC: 1.3 MG/DL
GLUCOSE SERPL-MCNC: 234 MG/DL
HCT VFR BLD CALC: 33.7 %
HGB BLD-MCNC: 11.3 G/DL
IRON SATN MFR SERPL: 34 %
IRON SERPL-MCNC: 98 UG/DL
LDH SERPL-CCNC: 151
MCHC RBC-ENTMCNC: 33.5 G/DL
MCHC RBC-ENTMCNC: 34.3 PG
MCV RBC AUTO: 102.4 FL
PLATELET # BLD AUTO: 221 K/UL
PMV BLD: 9.1 FL
POTASSIUM SERPL-SCNC: 4.9 MMOL/L
PROT SERPL-MCNC: 6.6 G/DL
RBC # BLD: 3.29 M/UL
RBC # FLD: 16.3 %
RETICS # AUTO: 1.5 %
RETICS AGGREG/RBC NFR: 48 K/UL
SODIUM SERPL-SCNC: 137 MMOL/L
TIBC SERPL-MCNC: 289 UG/DL
UIBC SERPL-MCNC: 191 UG/DL
WBC # FLD AUTO: 3.09 K/UL

## 2018-10-02 RX ORDER — ERYTHROPOIETIN 10000 [IU]/ML
40000 INJECTION, SOLUTION INTRAVENOUS; SUBCUTANEOUS ONCE
Qty: 0 | Refills: 0 | Status: COMPLETED | OUTPATIENT
Start: 2018-10-02 | End: 2018-10-02

## 2018-10-02 RX ADMIN — ERYTHROPOIETIN 40000 UNIT(S): 10000 INJECTION, SOLUTION INTRAVENOUS; SUBCUTANEOUS at 11:55

## 2018-10-03 LAB
FERRITIN SERPL-MCNC: 837 NG/ML
FOLATE SERPL-MCNC: >20 NG/ML
VIT B12 SERPL-MCNC: 186 PG/ML

## 2018-10-05 LAB
ALBUMIN MFR SERPL ELPH: 60.7 %
ALBUMIN SERPL-MCNC: 4.1 G/DL
ALBUMIN/GLOB SERPL: 1.5 RATIO
ALPHA1 GLOB MFR SERPL ELPH: 3.9 %
ALPHA1 GLOB SERPL ELPH-MCNC: 0.3 G/DL
ALPHA2 GLOB MFR SERPL ELPH: 10.2 %
ALPHA2 GLOB SERPL ELPH-MCNC: 0.7 G/DL
B-GLOBULIN MFR SERPL ELPH: 13.6 %
B-GLOBULIN SERPL ELPH-MCNC: 0.9 G/DL
GAMMA GLOB FLD ELPH-MCNC: 0.8 G/DL
GAMMA GLOB MFR SERPL ELPH: 11.6 %
INTERPRETATION SERPL IEP-IMP: NORMAL
M PROTEIN SPEC IFE-MCNC: NORMAL
PROT SERPL-MCNC: 6.8 G/DL
PROT SERPL-MCNC: 6.8 G/DL

## 2018-10-09 ENCOUNTER — APPOINTMENT (OUTPATIENT)
Dept: INFUSION THERAPY | Facility: CLINIC | Age: 73
End: 2018-10-09

## 2018-10-09 ENCOUNTER — LABORATORY RESULT (OUTPATIENT)
Age: 73
End: 2018-10-09

## 2018-10-09 LAB
HCT VFR BLD CALC: 32.3 %
HGB BLD-MCNC: 10.6 G/DL
MCHC RBC-ENTMCNC: 32.8 G/DL
MCHC RBC-ENTMCNC: 33.8 PG
MCV RBC AUTO: 102.9 FL
PLATELET # BLD AUTO: 311 K/UL
PMV BLD: 9.6 FL
RBC # BLD: 3.14 M/UL
RBC # FLD: 15.9 %
WBC # FLD AUTO: 2.53 K/UL

## 2018-10-09 RX ORDER — PREGABALIN 225 MG/1
1000 CAPSULE ORAL ONCE
Qty: 0 | Refills: 0 | Status: COMPLETED | OUTPATIENT
Start: 2018-10-09 | End: 2018-10-09

## 2018-10-09 RX ORDER — ERYTHROPOIETIN 10000 [IU]/ML
40000 INJECTION, SOLUTION INTRAVENOUS; SUBCUTANEOUS ONCE
Qty: 0 | Refills: 0 | Status: COMPLETED | OUTPATIENT
Start: 2018-10-09 | End: 2018-10-09

## 2018-10-09 RX ADMIN — ERYTHROPOIETIN 40000 UNIT(S): 10000 INJECTION, SOLUTION INTRAVENOUS; SUBCUTANEOUS at 09:33

## 2018-10-09 RX ADMIN — PREGABALIN 1000 MICROGRAM(S): 225 CAPSULE ORAL at 09:33

## 2018-10-12 ENCOUNTER — RX RENEWAL (OUTPATIENT)
Age: 73
End: 2018-10-12

## 2018-10-12 LAB
IF BLOCK AB SER QL: NORMAL
METHYLMALONATE SERPL-SCNC: 305 NMOL/L
PCA AB SER QL IF: NORMAL

## 2018-10-16 ENCOUNTER — LABORATORY RESULT (OUTPATIENT)
Age: 73
End: 2018-10-16

## 2018-10-16 ENCOUNTER — APPOINTMENT (OUTPATIENT)
Dept: INFUSION THERAPY | Facility: CLINIC | Age: 73
End: 2018-10-16

## 2018-10-16 RX ORDER — PREGABALIN 225 MG/1
1000 CAPSULE ORAL ONCE
Qty: 0 | Refills: 0 | Status: COMPLETED | OUTPATIENT
Start: 2018-10-16 | End: 2018-10-16

## 2018-10-16 RX ORDER — ERYTHROPOIETIN 10000 [IU]/ML
40000 INJECTION, SOLUTION INTRAVENOUS; SUBCUTANEOUS ONCE
Qty: 0 | Refills: 0 | Status: COMPLETED | OUTPATIENT
Start: 2018-10-16 | End: 2018-10-16

## 2018-10-16 RX ADMIN — PREGABALIN 1000 MICROGRAM(S): 225 CAPSULE ORAL at 16:41

## 2018-10-16 RX ADMIN — ERYTHROPOIETIN 40000 UNIT(S): 10000 INJECTION, SOLUTION INTRAVENOUS; SUBCUTANEOUS at 16:42

## 2018-10-17 LAB
HCT VFR BLD CALC: 30.7 %
HGB BLD-MCNC: 10.4 G/DL
MCHC RBC-ENTMCNC: 33.9 G/DL
MCHC RBC-ENTMCNC: 34.4 PG
MCV RBC AUTO: 101.7 FL
PLATELET # BLD AUTO: 270 K/UL
PMV BLD: 10.4 FL
RBC # BLD: 3.02 M/UL
RBC # FLD: 16.3 %
WBC # FLD AUTO: 4.5 K/UL

## 2018-10-23 ENCOUNTER — LABORATORY RESULT (OUTPATIENT)
Age: 73
End: 2018-10-23

## 2018-10-23 ENCOUNTER — APPOINTMENT (OUTPATIENT)
Dept: INFUSION THERAPY | Facility: CLINIC | Age: 73
End: 2018-10-23

## 2018-10-23 RX ORDER — PREGABALIN 225 MG/1
1000 CAPSULE ORAL ONCE
Qty: 0 | Refills: 0 | Status: COMPLETED | OUTPATIENT
Start: 2018-10-23 | End: 2018-10-23

## 2018-10-23 RX ORDER — ERYTHROPOIETIN 10000 [IU]/ML
40000 INJECTION, SOLUTION INTRAVENOUS; SUBCUTANEOUS ONCE
Qty: 0 | Refills: 0 | Status: COMPLETED | OUTPATIENT
Start: 2018-10-23 | End: 2018-10-23

## 2018-10-23 RX ADMIN — ERYTHROPOIETIN 40000 UNIT(S): 10000 INJECTION, SOLUTION INTRAVENOUS; SUBCUTANEOUS at 16:42

## 2018-10-23 RX ADMIN — PREGABALIN 1000 MICROGRAM(S): 225 CAPSULE ORAL at 16:42

## 2018-10-24 LAB
HCT VFR BLD CALC: 28.1 %
HGB BLD-MCNC: 9.5 G/DL
MCHC RBC-ENTMCNC: 33.8 G/DL
MCHC RBC-ENTMCNC: 34.4 PG
MCV RBC AUTO: 101.8 FL
PLATELET # BLD AUTO: 313 K/UL
PMV BLD: 9.6 FL
RBC # BLD: 2.76 M/UL
RBC # FLD: 15.9 %
WBC # FLD AUTO: 3.76 K/UL

## 2018-10-30 ENCOUNTER — FORM ENCOUNTER (OUTPATIENT)
Age: 73
End: 2018-10-30

## 2018-10-31 ENCOUNTER — OUTPATIENT (OUTPATIENT)
Dept: OUTPATIENT SERVICES | Facility: HOSPITAL | Age: 73
LOS: 1 days | Discharge: HOME | End: 2018-10-31

## 2018-10-31 ENCOUNTER — APPOINTMENT (OUTPATIENT)
Dept: INFUSION THERAPY | Facility: CLINIC | Age: 73
End: 2018-10-31

## 2018-10-31 ENCOUNTER — APPOINTMENT (OUTPATIENT)
Dept: HEMATOLOGY ONCOLOGY | Facility: CLINIC | Age: 73
End: 2018-10-31

## 2018-10-31 ENCOUNTER — LABORATORY RESULT (OUTPATIENT)
Age: 73
End: 2018-10-31

## 2018-10-31 VITALS
DIASTOLIC BLOOD PRESSURE: 51 MMHG | HEIGHT: 62 IN | RESPIRATION RATE: 14 BRPM | TEMPERATURE: 99.3 F | WEIGHT: 144 LBS | HEART RATE: 88 BPM | BODY MASS INDEX: 26.5 KG/M2 | SYSTOLIC BLOOD PRESSURE: 119 MMHG

## 2018-10-31 DIAGNOSIS — R05 COUGH: ICD-10-CM

## 2018-10-31 LAB
ALBUMIN SERPL ELPH-MCNC: 4.3 G/DL
ALP BLD-CCNC: 95 U/L
ALT SERPL-CCNC: 17 U/L
ANION GAP SERPL CALC-SCNC: 23 MMOL/L
AST SERPL-CCNC: 14 U/L
BILIRUB SERPL-MCNC: 0.5 MG/DL
BUN SERPL-MCNC: 17 MG/DL
CALCIUM SERPL-MCNC: 9.7 MG/DL
CHLORIDE SERPL-SCNC: 98 MMOL/L
CO2 SERPL-SCNC: 18 MMOL/L
CREAT SERPL-MCNC: 1.5 MG/DL
GLUCOSE SERPL-MCNC: 254 MG/DL
HCT VFR BLD CALC: 29.5 %
HGB BLD-MCNC: 9.7 G/DL
MAGNESIUM SERPL-MCNC: 1.6 MG/DL
MCHC RBC-ENTMCNC: 32.9 G/DL
MCHC RBC-ENTMCNC: 33.9 PG
MCV RBC AUTO: 103.1 FL
PLATELET # BLD AUTO: 399 K/UL
PMV BLD: 10.1 FL
POTASSIUM SERPL-SCNC: 4.6 MMOL/L
PROT SERPL-MCNC: 7.4 G/DL
RBC # BLD: 2.86 M/UL
RBC # FLD: 16.2 %
SODIUM SERPL-SCNC: 139 MMOL/L
WBC # FLD AUTO: 5.95 K/UL

## 2018-10-31 RX ORDER — ERYTHROPOIETIN 10000 [IU]/ML
40000 INJECTION, SOLUTION INTRAVENOUS; SUBCUTANEOUS ONCE
Qty: 0 | Refills: 0 | Status: COMPLETED | OUTPATIENT
Start: 2018-10-31 | End: 2018-10-31

## 2018-10-31 RX ORDER — PREGABALIN 225 MG/1
1000 CAPSULE ORAL ONCE
Qty: 0 | Refills: 0 | Status: COMPLETED | OUTPATIENT
Start: 2018-10-31 | End: 2018-10-31

## 2018-10-31 RX ADMIN — ERYTHROPOIETIN 40000 UNIT(S): 10000 INJECTION, SOLUTION INTRAVENOUS; SUBCUTANEOUS at 10:26

## 2018-10-31 RX ADMIN — PREGABALIN 1000 MICROGRAM(S): 225 CAPSULE ORAL at 10:26

## 2018-11-01 ENCOUNTER — OUTPATIENT (OUTPATIENT)
Dept: OUTPATIENT SERVICES | Facility: HOSPITAL | Age: 73
LOS: 1 days | End: 2018-11-01
Payer: MEDICAID

## 2018-11-01 PROCEDURE — G9001: CPT

## 2018-11-05 ENCOUNTER — APPOINTMENT (OUTPATIENT)
Dept: HEMATOLOGY ONCOLOGY | Facility: CLINIC | Age: 73
End: 2018-11-05

## 2018-11-06 ENCOUNTER — APPOINTMENT (OUTPATIENT)
Dept: INFUSION THERAPY | Facility: CLINIC | Age: 73
End: 2018-11-06

## 2018-11-08 ENCOUNTER — LABORATORY RESULT (OUTPATIENT)
Age: 73
End: 2018-11-08

## 2018-11-08 ENCOUNTER — APPOINTMENT (OUTPATIENT)
Dept: INFUSION THERAPY | Facility: CLINIC | Age: 73
End: 2018-11-08

## 2018-11-08 RX ORDER — MAGNESIUM SULFATE 500 MG/ML
2 VIAL (ML) INJECTION ONCE
Qty: 0 | Refills: 0 | Status: COMPLETED | OUTPATIENT
Start: 2018-11-08 | End: 2018-11-08

## 2018-11-08 RX ORDER — ERYTHROPOIETIN 10000 [IU]/ML
40000 INJECTION, SOLUTION INTRAVENOUS; SUBCUTANEOUS ONCE
Qty: 0 | Refills: 0 | Status: COMPLETED | OUTPATIENT
Start: 2018-11-08 | End: 2018-11-08

## 2018-11-08 RX ADMIN — Medication 50 GRAM(S): at 16:56

## 2018-11-08 RX ADMIN — ERYTHROPOIETIN 40000 UNIT(S): 10000 INJECTION, SOLUTION INTRAVENOUS; SUBCUTANEOUS at 16:56

## 2018-11-12 ENCOUNTER — RX RENEWAL (OUTPATIENT)
Age: 73
End: 2018-11-12

## 2018-11-13 ENCOUNTER — APPOINTMENT (OUTPATIENT)
Dept: INFUSION THERAPY | Facility: CLINIC | Age: 73
End: 2018-11-13

## 2018-11-16 LAB
HCT VFR BLD CALC: 26.5 %
HGB BLD-MCNC: 9.1 G/DL
MCHC RBC-ENTMCNC: 34.3 G/DL
MCHC RBC-ENTMCNC: 35.1 PG
MCV RBC AUTO: 102.3 FL
PLATELET # BLD AUTO: 309 K/UL
PMV BLD: 10.3 FL
RBC # BLD: 2.59 M/UL
RBC # FLD: 16.1 %
WBC # FLD AUTO: 7.25 K/UL

## 2018-11-20 ENCOUNTER — LABORATORY RESULT (OUTPATIENT)
Age: 73
End: 2018-11-20

## 2018-11-20 ENCOUNTER — APPOINTMENT (OUTPATIENT)
Dept: INFUSION THERAPY | Facility: CLINIC | Age: 73
End: 2018-11-20

## 2018-11-20 VITALS
RESPIRATION RATE: 18 BRPM | TEMPERATURE: 98.3 F | DIASTOLIC BLOOD PRESSURE: 69 MMHG | HEART RATE: 98 BPM | SYSTOLIC BLOOD PRESSURE: 117 MMHG

## 2018-11-20 RX ORDER — ERYTHROPOIETIN 10000 [IU]/ML
40000 INJECTION, SOLUTION INTRAVENOUS; SUBCUTANEOUS ONCE
Qty: 0 | Refills: 0 | Status: COMPLETED | OUTPATIENT
Start: 2018-11-20 | End: 2018-11-20

## 2018-11-20 RX ADMIN — ERYTHROPOIETIN 40000 UNIT(S): 10000 INJECTION, SOLUTION INTRAVENOUS; SUBCUTANEOUS at 16:38

## 2018-11-21 ENCOUNTER — RX RENEWAL (OUTPATIENT)
Age: 73
End: 2018-11-21

## 2018-11-21 LAB
HCT VFR BLD CALC: 25.8 %
HGB BLD-MCNC: 8.7 G/DL
MAGNESIUM SERPL-MCNC: 1.6 MG/DL
MCHC RBC-ENTMCNC: 33.7 G/DL
MCHC RBC-ENTMCNC: 34.3 PG
MCV RBC AUTO: 101.6 FL
PLATELET # BLD AUTO: 321 K/UL
PMV BLD: 9.8 FL
RBC # BLD: 2.54 M/UL
RBC # FLD: 15.9 %
WBC # FLD AUTO: 6.71 K/UL

## 2018-11-24 ENCOUNTER — INPATIENT (INPATIENT)
Facility: HOSPITAL | Age: 73
LOS: 1 days | Discharge: HOME | End: 2018-11-26
Attending: STUDENT IN AN ORGANIZED HEALTH CARE EDUCATION/TRAINING PROGRAM | Admitting: STUDENT IN AN ORGANIZED HEALTH CARE EDUCATION/TRAINING PROGRAM
Payer: MEDICAID

## 2018-11-24 VITALS
HEART RATE: 102 BPM | DIASTOLIC BLOOD PRESSURE: 61 MMHG | OXYGEN SATURATION: 100 % | TEMPERATURE: 98 F | SYSTOLIC BLOOD PRESSURE: 130 MMHG | RESPIRATION RATE: 20 BRPM

## 2018-11-24 LAB
ALBUMIN SERPL ELPH-MCNC: 4.4 G/DL — SIGNIFICANT CHANGE UP (ref 3.5–5.2)
ALP SERPL-CCNC: 97 U/L — SIGNIFICANT CHANGE UP (ref 30–115)
ALT FLD-CCNC: 12 U/L — SIGNIFICANT CHANGE UP (ref 0–41)
ANION GAP SERPL CALC-SCNC: 23 MMOL/L — HIGH (ref 7–14)
APTT BLD: 25.8 SEC — LOW (ref 27–39.2)
AST SERPL-CCNC: 11 U/L — SIGNIFICANT CHANGE UP (ref 0–41)
BASE EXCESS BLDV CALC-SCNC: -2.7 MMOL/L — LOW (ref -2–2)
BILIRUB SERPL-MCNC: 0.3 MG/DL — SIGNIFICANT CHANGE UP (ref 0.2–1.2)
BLD GP AB SCN SERPL QL: SIGNIFICANT CHANGE UP
BUN SERPL-MCNC: 26 MG/DL — HIGH (ref 10–20)
CA-I SERPL-SCNC: 1.18 MMOL/L — SIGNIFICANT CHANGE UP (ref 1.12–1.3)
CALCIUM SERPL-MCNC: 9.5 MG/DL — SIGNIFICANT CHANGE UP (ref 8.5–10.1)
CHLORIDE SERPL-SCNC: 91 MMOL/L — LOW (ref 98–110)
CO2 SERPL-SCNC: 19 MMOL/L — SIGNIFICANT CHANGE UP (ref 17–32)
CREAT SERPL-MCNC: 1.5 MG/DL — SIGNIFICANT CHANGE UP (ref 0.7–1.5)
GAS PNL BLDV: 137 MMOL/L — SIGNIFICANT CHANGE UP (ref 136–145)
GAS PNL BLDV: SIGNIFICANT CHANGE UP
GLUCOSE BLDC GLUCOMTR-MCNC: 117 MG/DL — HIGH (ref 70–99)
GLUCOSE SERPL-MCNC: 281 MG/DL — HIGH (ref 70–99)
HCO3 BLDV-SCNC: 23 MMOL/L — SIGNIFICANT CHANGE UP (ref 22–29)
HCT VFR BLD CALC: 27.8 % — LOW (ref 37–47)
HCT VFR BLDA CALC: 23.5 % — LOW (ref 34–44)
HGB BLD CALC-MCNC: 7.7 G/DL — LOW (ref 14–18)
HGB BLD-MCNC: 9.1 G/DL — LOW (ref 12–16)
INR BLD: 1.08 RATIO — SIGNIFICANT CHANGE UP (ref 0.65–1.3)
LACTATE BLDV-MCNC: 2.1 MMOL/L — HIGH (ref 0.5–1.6)
LACTATE SERPL-SCNC: 5 MMOL/L — CRITICAL HIGH (ref 0.5–2.2)
MCHC RBC-ENTMCNC: 32.7 G/DL — SIGNIFICANT CHANGE UP (ref 32–37)
MCHC RBC-ENTMCNC: 33.1 PG — HIGH (ref 27–31)
MCV RBC AUTO: 101.1 FL — HIGH (ref 81–99)
NRBC # BLD: 0 /100 WBCS — SIGNIFICANT CHANGE UP (ref 0–0)
PCO2 BLDV: 43 MMHG — SIGNIFICANT CHANGE UP (ref 41–51)
PH BLDV: 7.33 — SIGNIFICANT CHANGE UP (ref 7.26–7.43)
PLATELET # BLD AUTO: 343 K/UL — SIGNIFICANT CHANGE UP (ref 130–400)
PO2 BLDV: 23 MMHG — SIGNIFICANT CHANGE UP (ref 20–40)
POTASSIUM BLDV-SCNC: 4.5 MMOL/L — SIGNIFICANT CHANGE UP (ref 3.3–5.6)
POTASSIUM SERPL-MCNC: 5.1 MMOL/L — HIGH (ref 3.5–5)
POTASSIUM SERPL-SCNC: 5.1 MMOL/L — HIGH (ref 3.5–5)
PROT SERPL-MCNC: 6.9 G/DL — SIGNIFICANT CHANGE UP (ref 6–8)
PROTHROM AB SERPL-ACNC: 12.4 SEC — SIGNIFICANT CHANGE UP (ref 9.95–12.87)
RBC # BLD: 2.75 M/UL — LOW (ref 4.2–5.4)
RBC # FLD: 15.5 % — HIGH (ref 11.5–14.5)
SAO2 % BLDV: 33 % — SIGNIFICANT CHANGE UP
SODIUM SERPL-SCNC: 133 MMOL/L — LOW (ref 135–146)
TYPE + AB SCN PNL BLD: SIGNIFICANT CHANGE UP
WBC # BLD: 7.28 K/UL — SIGNIFICANT CHANGE UP (ref 4.8–10.8)
WBC # FLD AUTO: 7.28 K/UL — SIGNIFICANT CHANGE UP (ref 4.8–10.8)

## 2018-11-24 PROCEDURE — 99222 1ST HOSP IP/OBS MODERATE 55: CPT | Mod: 25

## 2018-11-24 PROCEDURE — 10061 I&D ABSCESS COMP/MULTIPLE: CPT

## 2018-11-24 RX ORDER — DEXTROSE 50 % IN WATER 50 %
25 SYRINGE (ML) INTRAVENOUS ONCE
Qty: 0 | Refills: 0 | Status: DISCONTINUED | OUTPATIENT
Start: 2018-11-24 | End: 2018-11-24

## 2018-11-24 RX ORDER — GLUCAGON INJECTION, SOLUTION 0.5 MG/.1ML
1 INJECTION, SOLUTION SUBCUTANEOUS ONCE
Qty: 0 | Refills: 0 | Status: DISCONTINUED | OUTPATIENT
Start: 2018-11-24 | End: 2018-11-26

## 2018-11-24 RX ORDER — DEXTROSE 50 % IN WATER 50 %
15 SYRINGE (ML) INTRAVENOUS ONCE
Qty: 0 | Refills: 0 | Status: DISCONTINUED | OUTPATIENT
Start: 2018-11-24 | End: 2018-11-24

## 2018-11-24 RX ORDER — VANCOMYCIN HCL 1 G
750 VIAL (EA) INTRAVENOUS EVERY 24 HOURS
Qty: 0 | Refills: 0 | Status: DISCONTINUED | OUTPATIENT
Start: 2018-11-25 | End: 2018-11-26

## 2018-11-24 RX ORDER — MORPHINE SULFATE 50 MG/1
4 CAPSULE, EXTENDED RELEASE ORAL ONCE
Qty: 0 | Refills: 0 | Status: DISCONTINUED | OUTPATIENT
Start: 2018-11-24 | End: 2018-11-24

## 2018-11-24 RX ORDER — SODIUM CHLORIDE 9 MG/ML
1000 INJECTION, SOLUTION INTRAVENOUS
Qty: 0 | Refills: 0 | Status: DISCONTINUED | OUTPATIENT
Start: 2018-11-24 | End: 2018-11-24

## 2018-11-24 RX ORDER — SODIUM CHLORIDE 9 MG/ML
1000 INJECTION INTRAMUSCULAR; INTRAVENOUS; SUBCUTANEOUS ONCE
Qty: 0 | Refills: 0 | Status: COMPLETED | OUTPATIENT
Start: 2018-11-24 | End: 2018-11-24

## 2018-11-24 RX ORDER — DEXTROSE 50 % IN WATER 50 %
12.5 SYRINGE (ML) INTRAVENOUS ONCE
Qty: 0 | Refills: 0 | Status: DISCONTINUED | OUTPATIENT
Start: 2018-11-24 | End: 2018-11-24

## 2018-11-24 RX ORDER — SODIUM CHLORIDE 9 MG/ML
1000 INJECTION INTRAMUSCULAR; INTRAVENOUS; SUBCUTANEOUS
Qty: 0 | Refills: 0 | Status: DISCONTINUED | OUTPATIENT
Start: 2018-11-24 | End: 2018-11-24

## 2018-11-24 RX ORDER — LENALIDOMIDE 5 MG/1
15 CAPSULE ORAL DAILY
Qty: 0 | Refills: 0 | Status: DISCONTINUED | OUTPATIENT
Start: 2018-11-24 | End: 2018-11-26

## 2018-11-24 RX ORDER — GLUCAGON INJECTION, SOLUTION 0.5 MG/.1ML
1 INJECTION, SOLUTION SUBCUTANEOUS ONCE
Qty: 0 | Refills: 0 | Status: DISCONTINUED | OUTPATIENT
Start: 2018-11-24 | End: 2018-11-24

## 2018-11-24 RX ORDER — LENALIDOMIDE 5 MG/1
15 CAPSULE ORAL DAILY
Qty: 0 | Refills: 0 | Status: DISCONTINUED | OUTPATIENT
Start: 2018-11-24 | End: 2018-11-24

## 2018-11-24 RX ORDER — INSULIN LISPRO 100/ML
VIAL (ML) SUBCUTANEOUS
Qty: 0 | Refills: 0 | Status: DISCONTINUED | OUTPATIENT
Start: 2018-11-24 | End: 2018-11-24

## 2018-11-24 RX ORDER — HEPARIN SODIUM 5000 [USP'U]/ML
5000 INJECTION INTRAVENOUS; SUBCUTANEOUS EVERY 8 HOURS
Qty: 0 | Refills: 0 | Status: DISCONTINUED | OUTPATIENT
Start: 2018-11-24 | End: 2018-11-24

## 2018-11-24 RX ORDER — PANTOPRAZOLE SODIUM 20 MG/1
40 TABLET, DELAYED RELEASE ORAL
Qty: 0 | Refills: 0 | Status: DISCONTINUED | OUTPATIENT
Start: 2018-11-24 | End: 2018-11-26

## 2018-11-24 RX ORDER — MORPHINE SULFATE 50 MG/1
4 CAPSULE, EXTENDED RELEASE ORAL EVERY 4 HOURS
Qty: 0 | Refills: 0 | Status: DISCONTINUED | OUTPATIENT
Start: 2018-11-24 | End: 2018-11-24

## 2018-11-24 RX ORDER — SODIUM CHLORIDE 9 MG/ML
1000 INJECTION, SOLUTION INTRAVENOUS
Qty: 0 | Refills: 0 | Status: DISCONTINUED | OUTPATIENT
Start: 2018-11-24 | End: 2018-11-25

## 2018-11-24 RX ORDER — HEPARIN SODIUM 5000 [USP'U]/ML
5000 INJECTION INTRAVENOUS; SUBCUTANEOUS EVERY 8 HOURS
Qty: 0 | Refills: 0 | Status: DISCONTINUED | OUTPATIENT
Start: 2018-11-24 | End: 2018-11-26

## 2018-11-24 RX ORDER — MORPHINE SULFATE 50 MG/1
4 CAPSULE, EXTENDED RELEASE ORAL EVERY 4 HOURS
Qty: 0 | Refills: 0 | Status: DISCONTINUED | OUTPATIENT
Start: 2018-11-24 | End: 2018-11-26

## 2018-11-24 RX ORDER — CEFEPIME 1 G/1
1000 INJECTION, POWDER, FOR SOLUTION INTRAMUSCULAR; INTRAVENOUS EVERY 12 HOURS
Qty: 0 | Refills: 0 | Status: DISCONTINUED | OUTPATIENT
Start: 2018-11-24 | End: 2018-11-26

## 2018-11-24 RX ORDER — SODIUM CHLORIDE 9 MG/ML
1000 INJECTION INTRAMUSCULAR; INTRAVENOUS; SUBCUTANEOUS
Qty: 0 | Refills: 0 | Status: DISCONTINUED | OUTPATIENT
Start: 2018-11-24 | End: 2018-11-25

## 2018-11-24 RX ORDER — ONDANSETRON 8 MG/1
4 TABLET, FILM COATED ORAL ONCE
Qty: 0 | Refills: 0 | Status: DISCONTINUED | OUTPATIENT
Start: 2018-11-24 | End: 2018-11-25

## 2018-11-24 RX ORDER — KETOROLAC TROMETHAMINE 30 MG/ML
30 SYRINGE (ML) INJECTION ONCE
Qty: 0 | Refills: 0 | Status: DISCONTINUED | OUTPATIENT
Start: 2018-11-24 | End: 2018-11-25

## 2018-11-24 RX ORDER — VANCOMYCIN HCL 1 G
VIAL (EA) INTRAVENOUS
Qty: 0 | Refills: 0 | Status: DISCONTINUED | OUTPATIENT
Start: 2018-11-24 | End: 2018-11-26

## 2018-11-24 RX ORDER — VANCOMYCIN HCL 1 G
750 VIAL (EA) INTRAVENOUS ONCE
Qty: 0 | Refills: 0 | Status: COMPLETED | OUTPATIENT
Start: 2018-11-24 | End: 2018-11-24

## 2018-11-24 RX ORDER — DEXTROSE 50 % IN WATER 50 %
25 SYRINGE (ML) INTRAVENOUS ONCE
Qty: 0 | Refills: 0 | Status: DISCONTINUED | OUTPATIENT
Start: 2018-11-24 | End: 2018-11-26

## 2018-11-24 RX ORDER — HYDROMORPHONE HYDROCHLORIDE 2 MG/ML
0.5 INJECTION INTRAMUSCULAR; INTRAVENOUS; SUBCUTANEOUS
Qty: 0 | Refills: 0 | Status: DISCONTINUED | OUTPATIENT
Start: 2018-11-24 | End: 2018-11-25

## 2018-11-24 RX ORDER — INSULIN LISPRO 100/ML
VIAL (ML) SUBCUTANEOUS
Qty: 0 | Refills: 0 | Status: DISCONTINUED | OUTPATIENT
Start: 2018-11-24 | End: 2018-11-26

## 2018-11-24 RX ORDER — PANTOPRAZOLE SODIUM 20 MG/1
40 TABLET, DELAYED RELEASE ORAL
Qty: 0 | Refills: 0 | Status: DISCONTINUED | OUTPATIENT
Start: 2018-11-24 | End: 2018-11-24

## 2018-11-24 RX ADMIN — Medication 250 MILLIGRAM(S): at 23:49

## 2018-11-24 RX ADMIN — SODIUM CHLORIDE 1000 MILLILITER(S): 9 INJECTION INTRAMUSCULAR; INTRAVENOUS; SUBCUTANEOUS at 17:23

## 2018-11-24 RX ADMIN — Medication 100 MILLIGRAM(S): at 18:56

## 2018-11-24 RX ADMIN — CEFEPIME 100 MILLIGRAM(S): 1 INJECTION, POWDER, FOR SOLUTION INTRAMUSCULAR; INTRAVENOUS at 23:07

## 2018-11-24 RX ADMIN — HEPARIN SODIUM 5000 UNIT(S): 5000 INJECTION INTRAVENOUS; SUBCUTANEOUS at 23:11

## 2018-11-24 RX ADMIN — SODIUM CHLORIDE 75 MILLILITER(S): 9 INJECTION INTRAMUSCULAR; INTRAVENOUS; SUBCUTANEOUS at 20:00

## 2018-11-24 RX ADMIN — MORPHINE SULFATE 4 MILLIGRAM(S): 50 CAPSULE, EXTENDED RELEASE ORAL at 18:56

## 2018-11-24 RX ADMIN — SODIUM CHLORIDE 75 MILLILITER(S): 9 INJECTION INTRAMUSCULAR; INTRAVENOUS; SUBCUTANEOUS at 23:01

## 2018-11-24 RX ADMIN — SODIUM CHLORIDE 1000 MILLILITER(S): 9 INJECTION INTRAMUSCULAR; INTRAVENOUS; SUBCUTANEOUS at 18:57

## 2018-11-24 NOTE — BRIEF OPERATIVE NOTE - POST-OP DX
Abscess  11/24/2018  let forearm  Active  Sherif Green  Carbuncle  11/24/2018  left back  Active  Sherif Green

## 2018-11-24 NOTE — PRE-ANESTHESIA EVALUATION ADULT - NSANTHOSAYNRD_GEN_A_CORE
No. RONIT screening performed.  STOP BANG Legend: 0-2 = LOW Risk; 3-4 = INTERMEDIATE Risk; 5-8 = HIGH Risk

## 2018-11-24 NOTE — CHART NOTE - NSCHARTNOTEFT_GEN_A_CORE
PACU ANESTHESIA ADMISSION NOTE      Procedure: incision and drainage left shoulder and left forearm abscess  Post op diagnosis:  Abscess  Carbuncle      ____  Intubated  TV:______       Rate: ______      FiO2: ______    __x__  Patent Airway    _x__  Full return of protective reflexes    ____  Full recovery from anesthesia / back to baseline     Vitals:   T:   98F        R: 16                 BP: 98/48                  Sat:  99                 P: 97      Mental Status:  __x__ Awake   _____ Alert   _____ Drowsy   _____ Sedated    Nausea/Vomiting:  _x___ NO  ______Yes,   See Post - Op Orders          Pain Scale (0-10):  __0___    Treatment: ____ None    ____ See Post - Op/PCA Orders    Post - Operative Fluids:   ____ Oral   __x__ See Post - Op Orders    Plan: Discharge:   ____Home       _x____Floor     _____Critical Care    _____  Other:_________________    Comments: Pt awake, VS stable, no anesthesia complications.

## 2018-11-24 NOTE — ED PROVIDER NOTE - PROGRESS NOTE DETAILS
Aware of lactate of 5.0. Added a 2L of NS. Spoke with sx, aware of pt will come to ED to eval. per Surg Dr. Sotomayor will take pt to OR for debridement, admit to Gen Surg Dr. Villarreal per Surg Dr. Sotomayor will take pt to OR for debridement, admit to Gen Surg Dr. Le Pt upgraded to main ED from urgent care. Labs done, abx given, sx consulted. As per surg, after evaluation, receommending pre-op labs including type & screen and coags, EKG & CXR and admit to surg to be taken to OR. Pt made aware of plan and agreeable.

## 2018-11-24 NOTE — H&P ADULT - NSHPPHYSICALEXAM_GEN_ALL_CORE
General: WN/WD NAD  Neurology: Awake, nonfocal, SEVERINO x 4  Respiratory: CTA B/L, No wheezing, rales, rhonchi  CV: RRR, S1S2, no murmurs, rubs or gallops  Abdominal: Soft, NT, ND +BS,   Extremities: No edema, + peripheral pulses  Skin: Large 4x5cm abscess to Left posterior shoulder. 2cm small abscess to Left forearm.

## 2018-11-24 NOTE — ED PROVIDER NOTE - MEDICAL DECISION MAKING DETAILS
L shoulder & forearm abscess, overlying cellulitis to shoulder, h/o DM and on immunosupp meds for MDS - labs, iv abx, pain control, Surgery c/s for poss complicated abscess, reasssess

## 2018-11-24 NOTE — H&P ADULT - HISTORY OF PRESENT ILLNESS
73F, PMHx of Myelodysplastic syndrome, DM, presents to the ED with a complaint of abscess to Left shoulder. Pt states 1 week ago she noticed a abscess forming to her Left shoulder, associated with pain. The abscess has been gradually increasing and becoming worse. Pt states she went to her pharmacist 3 days ago and received Ichthammol ointment which lead to the abscess to burst, exposing pus. Pt stopped using the ointment and the abscess still persistent which lead to the ED visit. Pt also states she has a new abscess forming on her Left forearm that started 1 day ago. Pt admits to chills. Pt denies ever experiencing this before. Otherwise pt denies fevers, nausea, vomiting, abdominal pain, dysuria, CP, or SOB. 73F, PMHx of Myelodysplastic syndrome, DM, presents to the ED with a complaint of abscess to Left shoulder. Pt states 1 week ago she noticed a abscess forming to her Left shoulder, associated with pain. The abscess has been gradually increasing and becoming worse. Pt states she went to her pharmacist 3 days ago and received Ichthammol ointment which lead to the abscess to burst, exposing pus. Pt stopped using the ointment and the abscess still persistent which lead to the ED visit. Pt also states she has a new abscess forming on her Left forearm that started 1 day ago. Pt admits to chills. Pt denies ever experiencing this before. Otherwise pt denies fevers, nausea, vomiting, abdominal pain, dysuria, CP, or SOB.    Pt takes Revlimid 500mg daily and Procrit weekly (next Procrit dose is due on upcoming tuesday (11/27/18).

## 2018-11-24 NOTE — ED PROVIDER NOTE - OBJECTIVE STATEMENT
72 yo female with h/o MDS (takes revlimid daily and procrit weekly), DM, HTN presents to the ED c/o abscess to left posterior shoulder x 1 week. Also with a smaller abscess to left forearm. Associated with chills but no fever. Denies chest pain, sob, abd pain, UE/LE weakness or paresthesias.

## 2018-11-24 NOTE — ED PROVIDER NOTE - ATTENDING CONTRIBUTION TO CARE
73y f h/o dm, htn, mds on procrit & revlimid (IS med) daily p/w L post shoulder abscess x 1 week. Worsening in size, painful. Also w/newer sm/early abscess to L forearm. Accomp by chills. Denies any known trauma. Denies fever, cp/sob, nv, abd pain, focal numbness or weakness. PE: elderly f nad, ncat, neck supple, rrr nl s1s2 no mrg, ctab no wrr, abd soft ntnd no palpable masses no rgr, no cvat, ext no cce dpi, skin- large fluctuance abscess overlying L scapula, punctate purulent areas, no active drainage, mild surrounding cellulitis, sm 1cm early abscess to L forearm, no sig fluctuance, no surrounding erythema, remainder of skin exam wnl. a/p: see mdm

## 2018-11-24 NOTE — ED PROVIDER NOTE - PHYSICAL EXAMINATION
GENERAL:  well appearing, non-toxic female in no acute distress  SKIN: skin warm, pink and dry. MMM. 5 cm abscess to left posterior shoulder with surrounding erythema, + induration. + 1 cm abscess to left forearm, no surrounding erythema or streaking. cap refill < 2 sec  HEAD: NC, AT  PULM: CTAB. Normal respiratory effort. No respiratory distress. No wheezes, stridor, rales or rhonchi. No retractions  CV: RRR, no M/R/G.   ABD: Soft, non-tender, non-distended  MSK: FROM of all extremities.  No MSK tenderness. No edema, erythema, cyanosis. radial pulses equal and intact bilaterally.  NEURO: A+Ox3, no sensory/motor deficits

## 2018-11-24 NOTE — BRIEF OPERATIVE NOTE - PROCEDURE
<<-----Click on this checkbox to enter Procedure Incision and drainage  11/24/2018  both left carbuncle and abscess  Active  [facility]

## 2018-11-24 NOTE — H&P ADULT - ASSESSMENT
ASSESSMENT/PLAN:   73F, PMHx, Myelodysplastic syndrome, DM, large 4x5cm abscess to Left posterior shoulder and 2cm small abscess to Left forearm   - Admit to surgical service  - OR for I&D of abscess   - Consent obtained and in chart   - NPO  - IVF  - pain control   -  IV ABX  - home meds ASSESSMENT/PLAN:   73F, PMHx, Myelodysplastic syndrome, DM, large 4x5cm abscess to Left posterior shoulder and 2cm small abscess to Left forearm   - Admit to surgical service  - OR for I&D of abscess   - Consent obtained and in chart   - NPO  - IVF  - pain control   -  IV ABX  - home meds     Senior Resident Note  Pt seen and examined  Above note has been reviewed and edited  Plan d/w patient and Dr. Le

## 2018-11-24 NOTE — ED PROVIDER NOTE - NS ED ROS FT
Constitutional: no fever, chills   Cardiovascular: no chest pain, no sob  Respiratory: no cough, no shortness of breath  Gastrointestinal: no nausea, vomiting or abd pain  Musculoskeletal: no fall or trauma  Integumentary: + abscess to left posterior shoulder and left forearm.   Neurological: no UE weakness or paresthesias  Allergic/Immunologic: no lymphadenopathy, no pruritus

## 2018-11-25 ENCOUNTER — TRANSCRIPTION ENCOUNTER (OUTPATIENT)
Age: 73
End: 2018-11-25

## 2018-11-25 LAB
ANION GAP SERPL CALC-SCNC: 16 MMOL/L — HIGH (ref 7–14)
BASOPHILS # BLD AUTO: 0.07 K/UL — SIGNIFICANT CHANGE UP (ref 0–0.2)
BASOPHILS # BLD AUTO: 0.07 K/UL — SIGNIFICANT CHANGE UP (ref 0–0.2)
BASOPHILS NFR BLD AUTO: 1.6 % — HIGH (ref 0–1)
BASOPHILS NFR BLD AUTO: 1.7 % — HIGH (ref 0–1)
BUN SERPL-MCNC: 18 MG/DL — SIGNIFICANT CHANGE UP (ref 10–20)
CALCIUM SERPL-MCNC: 8 MG/DL — LOW (ref 8.5–10.1)
CHLORIDE SERPL-SCNC: 104 MMOL/L — SIGNIFICANT CHANGE UP (ref 98–110)
CO2 SERPL-SCNC: 17 MMOL/L — SIGNIFICANT CHANGE UP (ref 17–32)
CREAT SERPL-MCNC: 1.4 MG/DL — SIGNIFICANT CHANGE UP (ref 0.7–1.5)
EOSINOPHIL # BLD AUTO: 0.24 K/UL — SIGNIFICANT CHANGE UP (ref 0–0.7)
EOSINOPHIL # BLD AUTO: 0.32 K/UL — SIGNIFICANT CHANGE UP (ref 0–0.7)
EOSINOPHIL NFR BLD AUTO: 5.7 % — SIGNIFICANT CHANGE UP (ref 0–8)
EOSINOPHIL NFR BLD AUTO: 7.4 % — SIGNIFICANT CHANGE UP (ref 0–8)
GLUCOSE BLDC GLUCOMTR-MCNC: 145 MG/DL — HIGH (ref 70–99)
GLUCOSE BLDC GLUCOMTR-MCNC: 314 MG/DL — HIGH (ref 70–99)
GLUCOSE BLDC GLUCOMTR-MCNC: 336 MG/DL — HIGH (ref 70–99)
GLUCOSE BLDC GLUCOMTR-MCNC: 98 MG/DL — SIGNIFICANT CHANGE UP (ref 70–99)
GLUCOSE SERPL-MCNC: 273 MG/DL — HIGH (ref 70–99)
GRAM STN FLD: SIGNIFICANT CHANGE UP
HCT VFR BLD CALC: 21.1 % — LOW (ref 37–47)
HCT VFR BLD CALC: 23.2 % — LOW (ref 37–47)
HGB BLD-MCNC: 6.8 G/DL — CRITICAL LOW (ref 12–16)
HGB BLD-MCNC: 7.5 G/DL — LOW (ref 12–16)
IMM GRANULOCYTES NFR BLD AUTO: 1.4 % — HIGH (ref 0.1–0.3)
IMM GRANULOCYTES NFR BLD AUTO: 1.4 % — HIGH (ref 0.1–0.3)
LYMPHOCYTES # BLD AUTO: 0.72 K/UL — LOW (ref 1.2–3.4)
LYMPHOCYTES # BLD AUTO: 0.83 K/UL — LOW (ref 1.2–3.4)
LYMPHOCYTES # BLD AUTO: 17 % — LOW (ref 20.5–51.1)
LYMPHOCYTES # BLD AUTO: 19.1 % — LOW (ref 20.5–51.1)
MAGNESIUM SERPL-MCNC: 1.5 MG/DL — LOW (ref 1.8–2.4)
MCHC RBC-ENTMCNC: 32.2 G/DL — SIGNIFICANT CHANGE UP (ref 32–37)
MCHC RBC-ENTMCNC: 32.3 G/DL — SIGNIFICANT CHANGE UP (ref 32–37)
MCHC RBC-ENTMCNC: 33.3 PG — HIGH (ref 27–31)
MCHC RBC-ENTMCNC: 33.5 PG — HIGH (ref 27–31)
MCV RBC AUTO: 103.1 FL — HIGH (ref 81–99)
MCV RBC AUTO: 103.9 FL — HIGH (ref 81–99)
MONOCYTES # BLD AUTO: 0.22 K/UL — SIGNIFICANT CHANGE UP (ref 0.1–0.6)
MONOCYTES # BLD AUTO: 0.24 K/UL — SIGNIFICANT CHANGE UP (ref 0.1–0.6)
MONOCYTES NFR BLD AUTO: 5.2 % — SIGNIFICANT CHANGE UP (ref 1.7–9.3)
MONOCYTES NFR BLD AUTO: 5.5 % — SIGNIFICANT CHANGE UP (ref 1.7–9.3)
NEUTROPHILS # BLD AUTO: 2.83 K/UL — SIGNIFICANT CHANGE UP (ref 1.4–6.5)
NEUTROPHILS # BLD AUTO: 2.92 K/UL — SIGNIFICANT CHANGE UP (ref 1.4–6.5)
NEUTROPHILS NFR BLD AUTO: 65 % — SIGNIFICANT CHANGE UP (ref 42.2–75.2)
NEUTROPHILS NFR BLD AUTO: 69 % — SIGNIFICANT CHANGE UP (ref 42.2–75.2)
NRBC # BLD: 0 /100 WBCS — SIGNIFICANT CHANGE UP (ref 0–0)
NRBC # BLD: 0 /100 WBCS — SIGNIFICANT CHANGE UP (ref 0–0)
PHOSPHATE SERPL-MCNC: 2.6 MG/DL — SIGNIFICANT CHANGE UP (ref 2.1–4.9)
PLATELET # BLD AUTO: 265 K/UL — SIGNIFICANT CHANGE UP (ref 130–400)
PLATELET # BLD AUTO: 281 K/UL — SIGNIFICANT CHANGE UP (ref 130–400)
POTASSIUM SERPL-MCNC: 4.9 MMOL/L — SIGNIFICANT CHANGE UP (ref 3.5–5)
POTASSIUM SERPL-SCNC: 4.9 MMOL/L — SIGNIFICANT CHANGE UP (ref 3.5–5)
RBC # BLD: 2.03 M/UL — LOW (ref 4.2–5.4)
RBC # BLD: 2.25 M/UL — LOW (ref 4.2–5.4)
RBC # FLD: 15.8 % — HIGH (ref 11.5–14.5)
RBC # FLD: 15.9 % — HIGH (ref 11.5–14.5)
SODIUM SERPL-SCNC: 137 MMOL/L — SIGNIFICANT CHANGE UP (ref 135–146)
SPECIMEN SOURCE: SIGNIFICANT CHANGE UP
WBC # BLD: 4.23 K/UL — LOW (ref 4.8–10.8)
WBC # BLD: 4.35 K/UL — LOW (ref 4.8–10.8)
WBC # FLD AUTO: 4.23 K/UL — LOW (ref 4.8–10.8)
WBC # FLD AUTO: 4.35 K/UL — LOW (ref 4.8–10.8)

## 2018-11-25 PROCEDURE — 99233 SBSQ HOSP IP/OBS HIGH 50: CPT

## 2018-11-25 RX ORDER — INSULIN HUMAN 100 [IU]/ML
8 INJECTION, SOLUTION SUBCUTANEOUS ONCE
Qty: 0 | Refills: 0 | Status: COMPLETED | OUTPATIENT
Start: 2018-11-25 | End: 2018-11-25

## 2018-11-25 RX ADMIN — CEFEPIME 100 MILLIGRAM(S): 1 INJECTION, POWDER, FOR SOLUTION INTRAMUSCULAR; INTRAVENOUS at 05:03

## 2018-11-25 RX ADMIN — Medication 250 MILLIGRAM(S): at 21:07

## 2018-11-25 RX ADMIN — Medication 8: at 12:38

## 2018-11-25 RX ADMIN — HEPARIN SODIUM 5000 UNIT(S): 5000 INJECTION INTRAVENOUS; SUBCUTANEOUS at 13:32

## 2018-11-25 RX ADMIN — PANTOPRAZOLE SODIUM 40 MILLIGRAM(S): 20 TABLET, DELAYED RELEASE ORAL at 05:03

## 2018-11-25 RX ADMIN — HEPARIN SODIUM 5000 UNIT(S): 5000 INJECTION INTRAVENOUS; SUBCUTANEOUS at 05:02

## 2018-11-25 RX ADMIN — HEPARIN SODIUM 5000 UNIT(S): 5000 INJECTION INTRAVENOUS; SUBCUTANEOUS at 21:06

## 2018-11-25 RX ADMIN — CEFEPIME 100 MILLIGRAM(S): 1 INJECTION, POWDER, FOR SOLUTION INTRAMUSCULAR; INTRAVENOUS at 17:39

## 2018-11-25 RX ADMIN — LENALIDOMIDE 15 MILLIGRAM(S): 5 CAPSULE ORAL at 19:28

## 2018-11-25 RX ADMIN — INSULIN HUMAN 8 UNIT(S): 100 INJECTION, SOLUTION SUBCUTANEOUS at 21:57

## 2018-11-25 NOTE — DISCHARGE NOTE ADULT - CARE PLAN
Principal Discharge DX:	Abscess  Goal:	Improvement of symptoms Principal Discharge DX:	Abscess  Goal:	Improvement of symptoms  Assessment and plan of treatment:	85F presented to the ED on 11/24 with chief complaint of left shoulder abscess that had developed over 1 week. Per her report, she had been prescribed icthammol salve 3 days ago, after which point the abscess partially ruptured, releasing pus. She presented to the ED on the 24th due to continued growth of L shoulder abscess and new L forearm abscess that developed on the 23rd. She was admitted to the hospital for surgical management of her abscess. She went to the OR on hospital day 1 for incision and debridement of both abscesses. She tolerated the procedure well and went to the floor wh Principal Discharge DX:	Abscess  Goal:	Improvement of symptoms  Assessment and plan of treatment:	85F presented to the ED on 11/24 with chief complaint of left shoulder abscess that had developed over 1 week. Per her report, she had been prescribed icthammol salve 3 days ago, after which point the abscess partially ruptured, releasing pus. She presented to the ED on the 24th due to continued growth of left shoulder abscess and new L forearm abscess that developed on the 23rd. She was admitted to the hospital for surgical management of her abscess. She went to the OR on hospital day 1 for incision and debridement of both abscesses. She tolerated the procedure well and went to the floor without issue.

## 2018-11-25 NOTE — DISCHARGE NOTE ADULT - ADDITIONAL INSTRUCTIONS
You are being discharged from [facility]. Please follow up in clinic with Dr. Le in the next 1-2 weeks. You have been prescribed an antibiotic called augmentin; please take this for the next week. You will need to dressing changes once a day to your shoulder. As you discussed with Dr. Le, prior to placing the dressing, please take a shower. For dressing, please place wet gauze (soaked in normal saline), then dry gauze, followed by pressure tape to your left shoulder. If you have any further questions about your care, please do not hesitate call Dr. Le's clinic. You are being discharged from [facility]. Please follow up in clinic with Dr. Le in the next 1-2 weeks.  Please call [phone] to schedule your appointment.  You have been prescribed an antibiotic called augmentin; please take this for the next week. You will need to dressing changes once a day to your shoulder. As you discussed with Dr. Le, prior to placing the dressing, please take a shower. For dressing, please place wet gauze (soaked in normal saline), then dry gauze, followed by pressure tape to your left shoulder. If you have any further questions about your care, please do not hesitate call Dr. Le's office.

## 2018-11-25 NOTE — PROGRESS NOTE ADULT - SUBJECTIVE AND OBJECTIVE BOX
Progress Note: General Surgery  Patient: SIMONA KUHN , 73y (1945)Female   MRN: [id number]  Location: [address]  Visit: 11-24-18 Inpatient  Date: 11-25-18 @ 05:55  Hospital Day:   Post-op Day:     Procedure/Diagnosis: S/P I&D of left back abscess  Events over 24h: Patient went to OR for I&D of abscess, tolerated procedure well.  Patient complains of mild pain in left back only when palpated.  Denies fever, chills, chest pain, shortness of breath, nausea, vomiting, abdominal pain.    Vitals: T(F): 97.6 (11-25-18 @ 03:00), Max: 99.1 (11-24-18 @ 19:52)  HR: 77 (11-25-18 @ 03:00)  BP: 111/54 (11-25-18 @ 03:00) (93/48 - 132/76)  RR: 18 (11-25-18 @ 03:00)  SpO2: 95% (11-24-18 @ 23:55)    In:   11-24-18 @ 07:01  -  11-25-18 @ 05:55  --------------------------------------------------------  IN: 0 mL      Out:   11-24-18 @ 07:01  -  11-25-18 @ 05:55  --------------------------------------------------------  OUT:  Total OUT: 0 mL        Net:   11-24-18 @ 07:01  -  11-25-18 @ 05:55  --------------------------------------------------------  NET: 0 mL      Diet: Diet, Regular (11-24-18 @ 22:33)    IV Fluids: yes no , Type: lactated ringers. 1000 milliLiter(s) (100 mL/Hr) IV Continuous <Continuous>  sodium chloride 0.9%. 1000 milliLiter(s) (75 mL/Hr) IV Continuous <Continuous>    Physical Examination:  General Appearance: NAD, alert and cooperative  HEENT: NCAT, WNL  Heart: S1 and S2. No murmurs. Rhythm is regular  Lungs: Clear to auscultation BL  Abdomen:  Positive bowel sounds. Soft, nondistended, nontender  Back: dressing clean/dry/intact, mild tenderness around incision site    Medications: [Standing]  cefepime   IVPB 1000 milliGRAM(s) IV Intermittent every 12 hours  dextrose 50% Injectable 25 Gram(s) IV Push once  heparin  Injectable 5000 Unit(s) SubCutaneous every 8 hours  insulin lispro (HumaLOG) corrective regimen sliding scale   SubCutaneous three times a day before meals  lactated ringers. 1000 milliLiter(s) (100 mL/Hr) IV Continuous <Continuous>  lenalidomide 15 milliGRAM(s) Oral daily  pantoprazole    Tablet 40 milliGRAM(s) Oral before breakfast  sodium chloride 0.9%. 1000 milliLiter(s) (75 mL/Hr) IV Continuous <Continuous>  vancomycin  IVPB      vancomycin  IVPB 750 milliGRAM(s) IV Intermittent every 24 hours    DVT Prophylaxis: heparin  Injectable 5000 Unit(s) SubCutaneous every 8 hours    GI Prophylaxis: pantoprazole    Tablet 40 milliGRAM(s) Oral before breakfast    Antibiotics: cefepime   IVPB 1000 milliGRAM(s) IV Intermittent every 12 hours  vancomycin  IVPB      vancomycin  IVPB 750 milliGRAM(s) IV Intermittent every 24 hours    Anticoagulation:   Medications:[PRN]  glucagon  Injectable 1 milliGRAM(s) IntraMuscular once PRN  HYDROmorphone  Injectable 0.5 milliGRAM(s) IV Push every 10 minutes PRN  ketorolac   Injectable 30 milliGRAM(s) IV Push once PRN  morphine  - Injectable 4 milliGRAM(s) IV Push every 4 hours PRN  ondansetron Injectable 4 milliGRAM(s) IV Push once PRN    Labs:                        9.1    7.28  )-----------( 343      ( 24 Nov 2018 16:30 )             27.8     11-24    133<L>  |  91<L>  |  26<H>  ----------------------------<  281<H>  5.1<H>   |  19  |  1.5    Ca    9.5      24 Nov 2018 16:30    TPro  6.9  /  Alb  4.4  /  TBili  0.3  /  DBili  x   /  AST  11  /  ALT  12  /  AlkPhos  97  11-24    LIVER FUNCTIONS - ( 24 Nov 2018 16:30 )  Alb: 4.4 g/dL / Pro: 6.9 g/dL / ALK PHOS: 97 U/L / ALT: 12 U/L / AST: 11 U/L / GGT: x           PT/INR - ( 24 Nov 2018 19:55 )   PT: 12.40 sec;   INR: 1.08 ratio         PTT - ( 24 Nov 2018 19:55 )  PTT:25.8 sec

## 2018-11-25 NOTE — DISCHARGE NOTE ADULT - MEDICATION SUMMARY - MEDICATIONS TO TAKE
I will START or STAY ON the medications listed below when I get home from the hospital:    glimepiride 2 mg oral tablet  -- 1 tab(s) by mouth once a day  -- Indication: For DM (diabetes mellitus)    metFORMIN  -- 500 milligram(s) by mouth 2 times a day  -- Indication: For DM (diabetes mellitus)    Procrit  -- 500 milligram(s) injectable every 7 days  -- Indication: For MDS (myelodysplastic syndrome)    Revlimid 5 mg oral capsule  -- 1 cap(s) by mouth once a day  -- Indication: For MDS (myelodysplastic syndrome)    amoxicillin-clavulanate 500 mg-125 mg oral tablet  -- 1 tab(s) by mouth 2 times a day MDD:2  -- Finish all this medication unless otherwise directed by prescriber.  Take with food or milk.    -- Indication: For Abscess

## 2018-11-25 NOTE — DISCHARGE NOTE ADULT - HOSPITAL COURSE
85F PMH myelodysplastic syndrome, diabetes mellitus presented to the ED on 11/24 with chief complaint of left shoulder abscess that had developed over 1 week. Per her report, she had been prescribed icthammol salve 3 days ago, after which point the abscess partially ruptured, releasing pus. She presented to the ED on the 24th due to continued growth of left shoulder abscess and new left forearm abscess that developed on the 23rd. She was admitted to the hospital for surgical management of her abscess. She went to the OR on hospital day 1 for incision and debridement of both abscesses. She tolerated the procedure well and went to the floor without issue. She is being sent home on a weeklong course of Augmentin. She should follow up in clinic with Dr. Le in 1-2 weeks in clinic. 85F PMH myelodysplastic syndrome, diabetes mellitus presented to the ED on 11/24 with chief complaint of left shoulder abscess that had developed over 1 week. Per her report, she had been prescribed icthammol salve 3 days ago, after which point the abscess partially ruptured, releasing pus. She presented to the ED on the 24th due to continued growth of left shoulder abscess and new left forearm abscess that developed on the 23rd. She was admitted to the hospital for surgical management of her abscess. She went to the OR on hospital day 1 for incision and debridement of both abscesses. She tolerated the procedure well and went to the floor without issue. She is being sent home on a weeklong course of Augmentin. She should follow up in clinic with Dr. Le in 1-2 weeks in office.

## 2018-11-25 NOTE — CHART NOTE - NSCHARTNOTEFT_GEN_A_CORE
Contacted Dr. Esteban, Heme-Onc fellow in regards to patient's hgb 6.8. D/t concern for hemodilution, we ordered a repeat hgb, which demonstrated hgb of 7.5. We recontacted Dr. Esteban with recommendation to keep Ms. Kennedy overnight for symptomatic monitoring and serial CBCs. Per his interpretation, he is concerned that this acute drop in hemoglobin is not due to her underlying myelodysplastic syndrome. His recommendation is to transfuse 1 upRBC if she becomes symptomatic: chest pain, shortness of breath, light-headed OR if her repeat hgb drops from current. If her hgb is low tomorrow, we should place a consult with Dr. Wade, her hematologist. She is scheduled for an outpatient appointment with Dr. Wade on 11/27.    Spectra [id number]

## 2018-11-25 NOTE — DISCHARGE NOTE ADULT - CARE PROVIDER_API CALL
Delia Le), [facility]  [address] [place] [address]  Phone: [phone]  Fax: [phone]

## 2018-11-25 NOTE — DISCHARGE NOTE ADULT - PLAN OF CARE
Improvement of symptoms 85F presented to the ED on 11/24 with chief complaint of left shoulder abscess that had developed over 1 week. Per her report, she had been prescribed icthammol salve 3 days ago, after which point the abscess partially ruptured, releasing pus. She presented to the ED on the 24th due to continued growth of L shoulder abscess and new L forearm abscess that developed on the 23rd. She was admitted to the hospital for surgical management of her abscess. She went to the OR on hospital day 1 for incision and debridement of both abscesses. She tolerated the procedure well and went to the floor wh 85F presented to the ED on 11/24 with chief complaint of left shoulder abscess that had developed over 1 week. Per her report, she had been prescribed icthammol salve 3 days ago, after which point the abscess partially ruptured, releasing pus. She presented to the ED on the 24th due to continued growth of left shoulder abscess and new L forearm abscess that developed on the 23rd. She was admitted to the hospital for surgical management of her abscess. She went to the OR on hospital day 1 for incision and debridement of both abscesses. She tolerated the procedure well and went to the floor without issue.

## 2018-11-25 NOTE — PROGRESS NOTE ADULT - ASSESSMENT
Assessment:  73y Female patient admitted S/P I&D of left back abscess    Plan:  -home meds  -pain control  -abx  -regular diet  -encourage ambulation  -incentive spirometer    Date/Time: 11-25-18 @ 05:55

## 2018-11-25 NOTE — DISCHARGE NOTE ADULT - PATIENT PORTAL LINK FT
You can access the [facility] Patient Portal, offered by [facility], by registering with the following website: [email or web address]

## 2018-11-26 VITALS
RESPIRATION RATE: 18 BRPM | DIASTOLIC BLOOD PRESSURE: 61 MMHG | TEMPERATURE: 99 F | HEART RATE: 79 BPM | SYSTOLIC BLOOD PRESSURE: 130 MMHG

## 2018-11-26 LAB
ANION GAP SERPL CALC-SCNC: 14 MMOL/L — SIGNIFICANT CHANGE UP (ref 7–14)
BASOPHILS # BLD AUTO: 0.07 K/UL — SIGNIFICANT CHANGE UP (ref 0–0.2)
BASOPHILS NFR BLD AUTO: 1.8 % — HIGH (ref 0–1)
BUN SERPL-MCNC: 19 MG/DL — SIGNIFICANT CHANGE UP (ref 10–20)
CALCIUM SERPL-MCNC: 8.2 MG/DL — LOW (ref 8.5–10.1)
CHLORIDE SERPL-SCNC: 105 MMOL/L — SIGNIFICANT CHANGE UP (ref 98–110)
CO2 SERPL-SCNC: 20 MMOL/L — SIGNIFICANT CHANGE UP (ref 17–32)
CREAT SERPL-MCNC: 1.5 MG/DL — SIGNIFICANT CHANGE UP (ref 0.7–1.5)
EOSINOPHIL # BLD AUTO: 0.31 K/UL — SIGNIFICANT CHANGE UP (ref 0–0.7)
EOSINOPHIL NFR BLD AUTO: 7.9 % — SIGNIFICANT CHANGE UP (ref 0–8)
ESTIMATED AVERAGE GLUCOSE: 177 MG/DL — HIGH (ref 68–114)
GLUCOSE BLDC GLUCOMTR-MCNC: 116 MG/DL — HIGH (ref 70–99)
GLUCOSE BLDC GLUCOMTR-MCNC: 128 MG/DL — HIGH (ref 70–99)
GLUCOSE BLDC GLUCOMTR-MCNC: 140 MG/DL — HIGH (ref 70–99)
GLUCOSE BLDC GLUCOMTR-MCNC: 222 MG/DL — HIGH (ref 70–99)
GLUCOSE SERPL-MCNC: 113 MG/DL — HIGH (ref 70–99)
HBA1C BLD-MCNC: 7.8 % — HIGH (ref 4–5.6)
HCT VFR BLD CALC: 19.3 % — LOW (ref 37–47)
HCT VFR BLD CALC: 25.6 % — LOW (ref 37–47)
HGB BLD-MCNC: 6.4 G/DL — CRITICAL LOW (ref 12–16)
HGB BLD-MCNC: 8.4 G/DL — LOW (ref 12–16)
IMM GRANULOCYTES NFR BLD AUTO: 2.3 % — HIGH (ref 0.1–0.3)
LYMPHOCYTES # BLD AUTO: 1.07 K/UL — LOW (ref 1.2–3.4)
LYMPHOCYTES # BLD AUTO: 27.4 % — SIGNIFICANT CHANGE UP (ref 20.5–51.1)
MAGNESIUM SERPL-MCNC: 1.5 MG/DL — LOW (ref 1.8–2.4)
MCHC RBC-ENTMCNC: 32.3 PG — HIGH (ref 27–31)
MCHC RBC-ENTMCNC: 32.8 G/DL — SIGNIFICANT CHANGE UP (ref 32–37)
MCHC RBC-ENTMCNC: 33.2 G/DL — SIGNIFICANT CHANGE UP (ref 32–37)
MCHC RBC-ENTMCNC: 34.2 PG — HIGH (ref 27–31)
MCV RBC AUTO: 103.2 FL — HIGH (ref 81–99)
MCV RBC AUTO: 98.5 FL — SIGNIFICANT CHANGE UP (ref 81–99)
MONOCYTES # BLD AUTO: 0.28 K/UL — SIGNIFICANT CHANGE UP (ref 0.1–0.6)
MONOCYTES NFR BLD AUTO: 7.2 % — SIGNIFICANT CHANGE UP (ref 1.7–9.3)
NEUTROPHILS # BLD AUTO: 2.09 K/UL — SIGNIFICANT CHANGE UP (ref 1.4–6.5)
NEUTROPHILS NFR BLD AUTO: 53.4 % — SIGNIFICANT CHANGE UP (ref 42.2–75.2)
NRBC # BLD: 0 /100 WBCS — SIGNIFICANT CHANGE UP (ref 0–0)
NRBC # BLD: 0 /100 WBCS — SIGNIFICANT CHANGE UP (ref 0–0)
PHOSPHATE SERPL-MCNC: 2.6 MG/DL — SIGNIFICANT CHANGE UP (ref 2.1–4.9)
PLATELET # BLD AUTO: 261 K/UL — SIGNIFICANT CHANGE UP (ref 130–400)
PLATELET # BLD AUTO: 304 K/UL — SIGNIFICANT CHANGE UP (ref 130–400)
POTASSIUM SERPL-MCNC: 4.3 MMOL/L — SIGNIFICANT CHANGE UP (ref 3.5–5)
POTASSIUM SERPL-SCNC: 4.3 MMOL/L — SIGNIFICANT CHANGE UP (ref 3.5–5)
RBC # BLD: 1.87 M/UL — LOW (ref 4.2–5.4)
RBC # BLD: 2.6 M/UL — LOW (ref 4.2–5.4)
RBC # FLD: 15.8 % — HIGH (ref 11.5–14.5)
RBC # FLD: 20.6 % — HIGH (ref 11.5–14.5)
SODIUM SERPL-SCNC: 139 MMOL/L — SIGNIFICANT CHANGE UP (ref 135–146)
WBC # BLD: 3.91 K/UL — LOW (ref 4.8–10.8)
WBC # BLD: 4.86 K/UL — SIGNIFICANT CHANGE UP (ref 4.8–10.8)
WBC # FLD AUTO: 3.91 K/UL — LOW (ref 4.8–10.8)
WBC # FLD AUTO: 4.86 K/UL — SIGNIFICANT CHANGE UP (ref 4.8–10.8)

## 2018-11-26 PROCEDURE — 99024 POSTOP FOLLOW-UP VISIT: CPT

## 2018-11-26 RX ORDER — ERYTHROPOIETIN 10000 [IU]/ML
500 INJECTION, SOLUTION INTRAVENOUS; SUBCUTANEOUS
Qty: 0 | Refills: 0 | COMMUNITY

## 2018-11-26 RX ADMIN — Medication 4: at 12:19

## 2018-11-26 RX ADMIN — CEFEPIME 100 MILLIGRAM(S): 1 INJECTION, POWDER, FOR SOLUTION INTRAMUSCULAR; INTRAVENOUS at 18:05

## 2018-11-26 RX ADMIN — HEPARIN SODIUM 5000 UNIT(S): 5000 INJECTION INTRAVENOUS; SUBCUTANEOUS at 06:05

## 2018-11-26 RX ADMIN — HEPARIN SODIUM 5000 UNIT(S): 5000 INJECTION INTRAVENOUS; SUBCUTANEOUS at 13:45

## 2018-11-26 RX ADMIN — CEFEPIME 100 MILLIGRAM(S): 1 INJECTION, POWDER, FOR SOLUTION INTRAMUSCULAR; INTRAVENOUS at 06:05

## 2018-11-26 RX ADMIN — PANTOPRAZOLE SODIUM 40 MILLIGRAM(S): 20 TABLET, DELAYED RELEASE ORAL at 06:05

## 2018-11-26 RX ADMIN — LENALIDOMIDE 15 MILLIGRAM(S): 5 CAPSULE ORAL at 18:06

## 2018-11-26 NOTE — PROGRESS NOTE ADULT - SUBJECTIVE AND OBJECTIVE BOX
Progress Note: General Surgery  Patient: SIMONA KUHN , 73y (1945)Female   MRN: [id number]  Location: [address]  Visit: 11-24-18 Inpatient  Date: 11-26-18 @ 06:16  Hospital Day: 4    Procedure/Diagnosis: S/p I&D for left shoulder abscess  Events over 24h: Overnight patient's hemoglobin was found to be 6.4, Heme/Onc was consulted, recommended to transfuse 1 unit pRBC. Otherwise asymptomatic, vitals stable. Denies nausea or vomiting, weakness    Vitals: T(F): 99.6 (11-26-18 @ 03:30), Max: 99.6 (11-26-18 @ 03:30)  HR: 85 (11-26-18 @ 03:30)  BP: 102/62 (11-26-18 @ 03:30) (99/58 - 150/68)  RR: 18 (11-26-18 @ 03:30)  SpO2: --    In:   11-24-18 @ 07:01  -  11-25-18 @ 07:00  --------------------------------------------------------  IN: 0 mL    11-25-18 @ 07:01  -  11-26-18 @ 06:16  --------------------------------------------------------  IN: 240 mL      Out:   11-24-18 @ 07:01  -  11-25-18 @ 07:00  --------------------------------------------------------  OUT:  Total OUT: 0 mL      11-25-18 @ 07:01  -  11-26-18 @ 06:16  --------------------------------------------------------  OUT:  Total OUT: 0 mL        Net:   11-24-18 @ 07:01  -  11-25-18 @ 07:00  --------------------------------------------------------  NET: 0 mL    11-25-18 @ 07:01  -  11-26-18 @ 06:16  --------------------------------------------------------  NET: 240 mL      Diet: Diet, Regular (11-24-18 @ 22:33)    IV Fluids: yes no , Type:   Physical Examination:  General Appearance: NAD, alert and cooperative  HEENT: NCAT, WNL  Heart: S1 and S2. No murmurs. RRR  Lungs: Clear to auscultation BL without rales, rhonchi, wheezing, crackles or diminished breath sounds.  Abdomen: Soft, nondistended      Medications: [Standing]  cefepime   IVPB 1000 milliGRAM(s) IV Intermittent every 12 hours  dextrose 50% Injectable 25 Gram(s) IV Push once  heparin  Injectable 5000 Unit(s) SubCutaneous every 8 hours  insulin lispro (HumaLOG) corrective regimen sliding scale   SubCutaneous three times a day before meals  lenalidomide 15 milliGRAM(s) Oral daily  pantoprazole    Tablet 40 milliGRAM(s) Oral before breakfast  vancomycin  IVPB      vancomycin  IVPB 750 milliGRAM(s) IV Intermittent every 24 hours    DVT Prophylaxis: heparin  Injectable 5000 Unit(s) SubCutaneous every 8 hours    GI Prophylaxis: pantoprazole    Tablet 40 milliGRAM(s) Oral before breakfast    Antibiotics: cefepime   IVPB 1000 milliGRAM(s) IV Intermittent every 12 hours  vancomycin  IVPB      vancomycin  IVPB 750 milliGRAM(s) IV Intermittent every 24 hours    Anticoagulation:   Medications:[PRN]  glucagon  Injectable 1 milliGRAM(s) IntraMuscular once PRN  morphine  - Injectable 4 milliGRAM(s) IV Push every 4 hours PRN    Labs:                        6.4    3.91  )-----------( 261      ( 26 Nov 2018 00:52 )             19.3     11-26    139  |  105  |  19  ----------------------------<  113<H>  4.3   |  20  |  1.5    Ca    8.2<L>      26 Nov 2018 00:52  Phos  2.6     11-26  Mg     1.5     11-26    TPro  6.9  /  Alb  4.4  /  TBili  0.3  /  DBili  x   /  AST  11  /  ALT  12  /  AlkPhos  97  11-24    LIVER FUNCTIONS - ( 24 Nov 2018 16:30 )  Alb: 4.4 g/dL / Pro: 6.9 g/dL / ALK PHOS: 97 U/L / ALT: 12 U/L / AST: 11 U/L / GGT: x           PT/INR - ( 24 Nov 2018 19:55 )   PT: 12.40 sec;   INR: 1.08 ratio         PTT - ( 24 Nov 2018 19:55 )  PTT:25.8 sec        Urine/Micro:    Culture - Tissue with Gram Stain (collected 25 Nov 2018 07:41)  Source: .Tissue Shoulder left  Gram Stain (25 Nov 2018 22:28):    No polymorphonuclear cells seen per low power field    Moderate Gram Positive Cocci in Clusters per oil power field        Imaging:  None/24h    Assessment:  73y Female patient admitted S/P     Plan:  Diet  IVF  Antibiotics  Ambulate  Encourage incentive spirometer use    Date/Time: 11-26-18 @ 06:16 Progress Note: General Surgery  Patient: SIMONA KUHN , 73y (1945)Female   MRN: [id number]  Location: [address]  Visit: 11-24-18 Inpatient  Date: 11-26-18 @ 06:16  Hospital Day: 4    Procedure/Diagnosis: S/p I&D for left shoulder abscess  Events over 24h: Overnight patient's hemoglobin was found to be 6.4, Heme/Onc was consulted, recommended to transfuse 1 unit pRBC. Otherwise asymptomatic, vitals stable. Denies nausea or vomiting, weakness, SOB.     Vitals: T(F): 99.6 (11-26-18 @ 03:30), Max: 99.6 (11-26-18 @ 03:30)  HR: 85 (11-26-18 @ 03:30)  BP: 102/62 (11-26-18 @ 03:30) (99/58 - 150/68)  RR: 18 (11-26-18 @ 03:30)  SpO2: --    In:   11-24-18 @ 07:01  -  11-25-18 @ 07:00  --------------------------------------------------------  IN: 0 mL    11-25-18 @ 07:01  -  11-26-18 @ 06:16  --------------------------------------------------------  IN: 240 mL      Out:   11-24-18 @ 07:01  -  11-25-18 @ 07:00  --------------------------------------------------------  OUT:  Total OUT: 0 mL      11-25-18 @ 07:01  -  11-26-18 @ 06:16  --------------------------------------------------------  OUT:  Total OUT: 0 mL        Net:   11-24-18 @ 07:01  -  11-25-18 @ 07:00  --------------------------------------------------------  NET: 0 mL    11-25-18 @ 07:01  -  11-26-18 @ 06:16  --------------------------------------------------------  NET: 240 mL      Diet: Diet, Regular (11-24-18 @ 22:33)    Physical Examination:  General Appearance: NAD, alert and cooperative  HEENT: NCAT, WNL  Heart: S1 and S2. No murmurs. RRR  Lungs: Clear to auscultation BL without rales, rhonchi, wheezing, crackles or diminished breath sounds.  Abdomen: Soft, nondistended      Medications: [Standing]  cefepime   IVPB 1000 milliGRAM(s) IV Intermittent every 12 hours  dextrose 50% Injectable 25 Gram(s) IV Push once  heparin  Injectable 5000 Unit(s) SubCutaneous every 8 hours  insulin lispro (HumaLOG) corrective regimen sliding scale   SubCutaneous three times a day before meals  lenalidomide 15 milliGRAM(s) Oral daily  pantoprazole    Tablet 40 milliGRAM(s) Oral before breakfast  vancomycin  IVPB      vancomycin  IVPB 750 milliGRAM(s) IV Intermittent every 24 hours    DVT Prophylaxis: heparin  Injectable 5000 Unit(s) SubCutaneous every 8 hours    GI Prophylaxis: pantoprazole    Tablet 40 milliGRAM(s) Oral before breakfast    Antibiotics: cefepime   IVPB 1000 milliGRAM(s) IV Intermittent every 12 hours  vancomycin  IVPB      vancomycin  IVPB 750 milliGRAM(s) IV Intermittent every 24 hours    Anticoagulation:   Medications:[PRN]  glucagon  Injectable 1 milliGRAM(s) IntraMuscular once PRN  morphine  - Injectable 4 milliGRAM(s) IV Push every 4 hours PRN    Labs:                        6.4    3.91  )-----------( 261      ( 26 Nov 2018 00:52 )             19.3     11-26    139  |  105  |  19  ----------------------------<  113<H>  4.3   |  20  |  1.5    Ca    8.2<L>      26 Nov 2018 00:52  Phos  2.6     11-26  Mg     1.5     11-26    TPro  6.9  /  Alb  4.4  /  TBili  0.3  /  DBili  x   /  AST  11  /  ALT  12  /  AlkPhos  97  11-24    LIVER FUNCTIONS - ( 24 Nov 2018 16:30 )  Alb: 4.4 g/dL / Pro: 6.9 g/dL / ALK PHOS: 97 U/L / ALT: 12 U/L / AST: 11 U/L / GGT: x           PT/INR - ( 24 Nov 2018 19:55 )   PT: 12.40 sec;   INR: 1.08 ratio    PTT - ( 24 Nov 2018 19:55 )  PTT:25.8 sec    Urine/Micro:    Culture - Tissue with Gram Stain (collected 25 Nov 2018 07:41)  Source: .Tissue Shoulder left  Gram Stain (25 Nov 2018 22:28):    No polymorphonuclear cells seen per low power field    Moderate Gram Positive Cocci in Clusters per oil power field      Assessment:  73y Female patient admitted S/P I&D for abdominal wall abscess    Plan:  Diet regular  Trend H/H  F/u 11am CBC  F/u Heme/onc  Ambulate  Encourage incentive spirometer use    Date/Time: 11-26-18 @ 06:16

## 2018-11-26 NOTE — CONSULT NOTE ADULT - ASSESSMENT
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  Continue ABX.  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    1.  Low-risk MDS, admitted with abscesses-Continue ABX.  Continue home regimen of revlimid.  Follow up in office on DC.  Patient has appointment with Dr. Wade at [facility] on 11/27/2018.  If patient is still admitted to hospital tomorrow and will not make [facility] appointment, give procrit and vitamin B12.  Otherwise, no contraindication to discharge from hematologic standpoint.

## 2018-11-26 NOTE — CONSULT NOTE ADULT - SUBJECTIVE AND OBJECTIVE BOX
Patient is a 73y old  Female who presents with a chief complaint of Left posterior abscess (25 Nov 2018 09:40)      HPI:  73F, PMHx of Myelodysplastic syndrome, DM, presents to the ED with a complaint of abscess to Left shoulder. Pt states 1 week ago she noticed a abscess forming to her Left shoulder, associated with pain. The abscess has been gradually increasing and becoming worse. Pt states she went to her pharmacist 3 days ago and received Ichthammol ointment which lead to the abscess to burst, exposing pus. Pt stopped using the ointment and the abscess still persistent which lead to the ED visit. Pt also states she has a new abscess forming on her Left forearm that started 1 day ago. Pt admits to chills. Pt denies ever experiencing this before. Otherwise pt denies fevers, nausea, vomiting, abdominal pain, dysuria, CP, or SOB.    Pt takes Revlimid 500mg daily and Procrit weekly (next Procrit dose is due on upcoming tuesday (11/27/18). (24 Nov 2018 19:27)       ROS:  Negative except for:    PAST MEDICAL & SURGICAL HISTORY:  MDS (myelodysplastic syndrome)  DM (diabetes mellitus)  No significant past surgical history      SOCIAL HISTORY:    FAMILY HISTORY:      MEDICATIONS  (STANDING):  cefepime   IVPB 1000 milliGRAM(s) IV Intermittent every 12 hours  dextrose 50% Injectable 25 Gram(s) IV Push once  heparin  Injectable 5000 Unit(s) SubCutaneous every 8 hours  insulin lispro (HumaLOG) corrective regimen sliding scale   SubCutaneous three times a day before meals  lenalidomide 15 milliGRAM(s) Oral daily  pantoprazole    Tablet 40 milliGRAM(s) Oral before breakfast  vancomycin  IVPB      vancomycin  IVPB 750 milliGRAM(s) IV Intermittent every 24 hours    MEDICATIONS  (PRN):  glucagon  Injectable 1 milliGRAM(s) IntraMuscular once PRN Glucose LESS THAN 70 milligrams/deciliter  morphine  - Injectable 4 milliGRAM(s) IV Push every 4 hours PRN Moderate Pain (4 - 6)      Allergies    No Known Allergies    Intolerances        Vital Signs Last 24 Hrs  T(C): 37.3 (26 Nov 2018 07:34), Max: 37.6 (26 Nov 2018 03:30)  T(F): 99.1 (26 Nov 2018 07:34), Max: 99.6 (26 Nov 2018 03:30)  HR: 78 (26 Nov 2018 07:34) (78 - 102)  BP: 127/62 (26 Nov 2018 07:34) (102/62 - 150/68)  BP(mean): 73 (25 Nov 2018 23:30) (73 - 98)  RR: 18 (26 Nov 2018 07:34) (18 - 18)  SpO2: --    PHYSICAL EXAM  General: adult in NAD  HEENT: clear oropharynx, anicteric sclera, pink conjunctiva  Neck: supple  CV: normal S1/S2 with no murmur rubs or gallops  Lungs: positive air movement b/l ant lungs,clear to auscultation, no wheezes, no rales  Abdomen: soft non-tender non-distended, no hepatosplenomegaly  Ext: no clubbing cyanosis or edema  Skin: no rashes and no petechiae  Neuro: alert and oriented X 4, no focal deficits      LABS:                          6.4    3.91  )-----------( 261      ( 26 Nov 2018 00:52 )             19.3         Mean Cell Volume : 103.2 fL  Mean Cell Hemoglobin : 34.2 pg  Mean Cell Hemoglobin Concentration : 33.2 g/dL  Auto Neutrophil # : 2.09 K/uL  Auto Lymphocyte # : 1.07 K/uL  Auto Monocyte # : 0.28 K/uL  Auto Eosinophil # : 0.31 K/uL  Auto Basophil # : 0.07 K/uL  Auto Neutrophil % : 53.4 %  Auto Lymphocyte % : 27.4 %  Auto Monocyte % : 7.2 %  Auto Eosinophil % : 7.9 %  Auto Basophil % : 1.8 %      Serial CBC's  11-26 @ 00:52  Hct-19.3 / Hgb-6.4 / Plat-261 / RBC-1.87 / WBC-3.91  Serial CBC's  11-25 @ 14:20  Hct-23.2 / Hgb-7.5 / Plat-281 / RBC-2.25 / WBC-4.35  Serial CBC's  11-25 @ 11:47  Hct-21.1 / Hgb-6.8 / Plat-265 / RBC-2.03 / WBC-4.23  Serial CBC's  11-24 @ 16:30  Hct-27.8 / Hgb-9.1 / Plat-343 / RBC-2.75 / WBC-7.28      11-26    139  |  105  |  19  ----------------------------<  113<H>  4.3   |  20  |  1.5    Ca    8.2<L>      26 Nov 2018 00:52  Phos  2.6     11-26  Mg     1.5     11-26    TPro  6.9  /  Alb  4.4  /  TBili  0.3  /  DBili  x   /  AST  11  /  ALT  12  /  AlkPhos  97  11-24      PT/INR - ( 24 Nov 2018 19:55 )   PT: 12.40 sec;   INR: 1.08 ratio         PTT - ( 24 Nov 2018 19:55 )  PTT:25.8 sec Patient is a 73y old  Female who presents with a chief complaint of Left posterior abscess (25 Nov 2018 09:40)      HPI:  73F, PMHx of Myelodysplastic syndrome, DM, presents to the ED with a complaint of abscess to Left shoulder. Pt states 1 week ago she noticed a abscess forming to her Left shoulder, associated with pain. The abscess has been gradually increasing and becoming worse. Pt states she went to her pharmacist 3 days ago and received Ichthammol ointment which lead to the abscess to burst, exposing pus. Pt stopped using the ointment and the abscess still persistent which lead to the ED visit. Pt also states she has a new abscess forming on her Left forearm that started 1 day ago. Pt admits to chills. Pt denies ever experiencing this before. Otherwise pt denies fevers, nausea, vomiting, abdominal pain, dysuria, CP, or SOB.    Pt takes Revlimid 5 mg daily (2 weeks on, 1 week off) and Procrit weekly (next Procrit dose is due on upcoming Tuesday (11/27/18). (24 Nov 2018 19:27)    Patient did not require PRBC transfusions for many years, then missed several doses of procrit in September and subsequently required transfusions.    ROS: as above    PAST MEDICAL & SURGICAL HISTORY:  MDS (myelodysplastic syndrome)  DM (diabetes mellitus)  No significant past surgical history    SOCIAL HISTORY: non-smoker    FAMILY HISTORY: negative    MEDICATIONS  (STANDING):  cefepime   IVPB 1000 milliGRAM(s) IV Intermittent every 12 hours  dextrose 50% Injectable 25 Gram(s) IV Push once  heparin  Injectable 5000 Unit(s) SubCutaneous every 8 hours  insulin lispro (HumaLOG) corrective regimen sliding scale   SubCutaneous three times a day before meals  lenalidomide 15 milliGRAM(s) Oral daily  pantoprazole    Tablet 40 milliGRAM(s) Oral before breakfast  vancomycin  IVPB      vancomycin  IVPB 750 milliGRAM(s) IV Intermittent every 24 hours    MEDICATIONS  (PRN):  glucagon  Injectable 1 milliGRAM(s) IntraMuscular once PRN Glucose LESS THAN 70 milligrams/deciliter  morphine  - Injectable 4 milliGRAM(s) IV Push every 4 hours PRN Moderate Pain (4 - 6)      Allergies    No Known Allergies    Intolerances        Vital Signs Last 24 Hrs  T(C): 37.3 (26 Nov 2018 07:34), Max: 37.6 (26 Nov 2018 03:30)  T(F): 99.1 (26 Nov 2018 07:34), Max: 99.6 (26 Nov 2018 03:30)  HR: 78 (26 Nov 2018 07:34) (78 - 102)  BP: 127/62 (26 Nov 2018 07:34) (102/62 - 150/68)  BP(mean): 73 (25 Nov 2018 23:30) (73 - 98)  RR: 18 (26 Nov 2018 07:34) (18 - 18)  SpO2: --    PHYSICAL EXAM  General: adult in NAD  HEENT: clear oropharynx, anicteric sclera, pink conjunctiva  Neck: supple  CV: normal S1/S2 with no murmur rubs or gallops  Lungs: positive air movement b/l ant lungs,clear to auscultation, no wheezes, no rales  Abdomen: soft non-tender non-distended, no hepatosplenomegaly  Ext: no clubbing cyanosis or edema  Skin: no rashes and no petechiae  Neuro: alert and oriented X 4, no focal deficits      LABS:                          6.4    3.91  )-----------( 261      ( 26 Nov 2018 00:52 )             19.3     (S/p one unit of PRBCs.  Repeat hemoglobin of 8.4.)    Mean Cell Volume : 103.2 fL  Mean Cell Hemoglobin : 34.2 pg  Mean Cell Hemoglobin Concentration : 33.2 g/dL  Auto Neutrophil # : 2.09 K/uL  Auto Lymphocyte # : 1.07 K/uL  Auto Monocyte # : 0.28 K/uL  Auto Eosinophil # : 0.31 K/uL  Auto Basophil # : 0.07 K/uL  Auto Neutrophil % : 53.4 %  Auto Lymphocyte % : 27.4 %  Auto Monocyte % : 7.2 %  Auto Eosinophil % : 7.9 %  Auto Basophil % : 1.8 %      Serial CBC's  11-26 @ 00:52  Hct-19.3 / Hgb-6.4 / Plat-261 / RBC-1.87 / WBC-3.91  Serial CBC's  11-25 @ 14:20  Hct-23.2 / Hgb-7.5 / Plat-281 / RBC-2.25 / WBC-4.35  Serial CBC's  11-25 @ 11:47  Hct-21.1 / Hgb-6.8 / Plat-265 / RBC-2.03 / WBC-4.23  Serial CBC's  11-24 @ 16:30  Hct-27.8 / Hgb-9.1 / Plat-343 / RBC-2.75 / WBC-7.28      11-26    139  |  105  |  19  ----------------------------<  113<H>  4.3   |  20  |  1.5    Ca    8.2<L>      26 Nov 2018 00:52  Phos  2.6     11-26  Mg     1.5     11-26    TPro  6.9  /  Alb  4.4  /  TBili  0.3  /  DBili  x   /  AST  11  /  ALT  12  /  AlkPhos  97  11-24      PT/INR - ( 24 Nov 2018 19:55 )   PT: 12.40 sec;   INR: 1.08 ratio         PTT - ( 24 Nov 2018 19:55 )  PTT:25.8 sec Patient is a 73y old  Female who presents with a chief complaint of Left posterior abscess (25 Nov 2018 09:40)      HPI:  73F, PMHx of Myelodysplastic syndrome, DM, presents to the ED with a complaint of abscess to Left shoulder. Pt states 1 week ago she noticed a abscess forming to her Left shoulder, associated with pain. The abscess has been gradually increasing and becoming worse. Pt states she went to her pharmacist 3 days ago and received Ichthammol ointment which lead to the abscess to burst, exposing pus. Pt stopped using the ointment and the abscess still persistent which lead to the ED visit. Pt also states she has a new abscess forming on her Left forearm that started 1 day ago. Pt admits to chills. Pt denies ever experiencing this before. Otherwise pt denies fevers, nausea, vomiting, abdominal pain, dysuria, CP, or SOB.    Pt takes Revlimid 5 mg daily (2 weeks on, 1 week off) and Procrit weekly (next Procrit dose is due on upcoming Tuesday (11/27/18). (24 Nov 2018 19:27)    Patient did not require PRBC transfusions for many years, then missed several doses of procrit in September and subsequently required transfusions.    ROS: as above    PAST MEDICAL & SURGICAL HISTORY:  MDS (myelodysplastic syndrome)  DM (diabetes mellitus)  No significant past surgical history    SOCIAL HISTORY: non-smoker    FAMILY HISTORY: negative    MEDICATIONS  (STANDING):  cefepime   IVPB 1000 milliGRAM(s) IV Intermittent every 12 hours  dextrose 50% Injectable 25 Gram(s) IV Push once  heparin  Injectable 5000 Unit(s) SubCutaneous every 8 hours  insulin lispro (HumaLOG) corrective regimen sliding scale   SubCutaneous three times a day before meals  lenalidomide 15 milliGRAM(s) Oral daily  pantoprazole    Tablet 40 milliGRAM(s) Oral before breakfast  vancomycin  IVPB      vancomycin  IVPB 750 milliGRAM(s) IV Intermittent every 24 hours    MEDICATIONS  (PRN):  glucagon  Injectable 1 milliGRAM(s) IntraMuscular once PRN Glucose LESS THAN 70 milligrams/deciliter  morphine  - Injectable 4 milliGRAM(s) IV Push every 4 hours PRN Moderate Pain (4 - 6)      Allergies    No Known Allergies    Intolerances        Vital Signs Last 24 Hrs  T(C): 37.3 (26 Nov 2018 07:34), Max: 37.6 (26 Nov 2018 03:30)  T(F): 99.1 (26 Nov 2018 07:34), Max: 99.6 (26 Nov 2018 03:30)  HR: 78 (26 Nov 2018 07:34) (78 - 102)  BP: 127/62 (26 Nov 2018 07:34) (102/62 - 150/68)  BP(mean): 73 (25 Nov 2018 23:30) (73 - 98)  RR: 18 (26 Nov 2018 07:34) (18 - 18)  SpO2: --    PHYSICAL EXAM  General: adult in NAD  HEENT: clear oropharynx, anicteric sclera, pink conjunctiva  Neck: supple  CV: normal S1/S2 with no murmur rubs or gallops  Lungs: positive air movement b/l ant lungs,clear to auscultation, no wheezes, no rales  Abdomen: soft non-tender non-distended, no hepatosplenomegaly  Ext: no clubbing cyanosis or edema  Skin: no rashes and no petechiae, raised lesions under right breast, left forearm and a debrided lesion on left shoulder ( per pt improved from before), not typical boils  Neuro: alert and oriented X 4, no focal deficits      LABS:                          6.4    3.91  )-----------( 261      ( 26 Nov 2018 00:52 )             19.3     (S/p one unit of PRBCs.  Repeat hemoglobin of 8.4.)    Mean Cell Volume : 103.2 fL  Mean Cell Hemoglobin : 34.2 pg  Mean Cell Hemoglobin Concentration : 33.2 g/dL  Auto Neutrophil # : 2.09 K/uL  Auto Lymphocyte # : 1.07 K/uL  Auto Monocyte # : 0.28 K/uL  Auto Eosinophil # : 0.31 K/uL  Auto Basophil # : 0.07 K/uL  Auto Neutrophil % : 53.4 %  Auto Lymphocyte % : 27.4 %  Auto Monocyte % : 7.2 %  Auto Eosinophil % : 7.9 %  Auto Basophil % : 1.8 %      Serial CBC's  11-26 @ 00:52  Hct-19.3 / Hgb-6.4 / Plat-261 / RBC-1.87 / WBC-3.91  Serial CBC's  11-25 @ 14:20  Hct-23.2 / Hgb-7.5 / Plat-281 / RBC-2.25 / WBC-4.35  Serial CBC's  11-25 @ 11:47  Hct-21.1 / Hgb-6.8 / Plat-265 / RBC-2.03 / WBC-4.23  Serial CBC's  11-24 @ 16:30  Hct-27.8 / Hgb-9.1 / Plat-343 / RBC-2.75 / WBC-7.28      11-26    139  |  105  |  19  ----------------------------<  113<H>  4.3   |  20  |  1.5    Ca    8.2<L>      26 Nov 2018 00:52  Phos  2.6     11-26  Mg     1.5     11-26    TPro  6.9  /  Alb  4.4  /  TBili  0.3  /  DBili  x   /  AST  11  /  ALT  12  /  AlkPhos  97  11-24      PT/INR - ( 24 Nov 2018 19:55 )   PT: 12.40 sec;   INR: 1.08 ratio         PTT - ( 24 Nov 2018 19:55 )  PTT:25.8 sec Patient is a 73y old  Female who presents with a chief complaint of Left posterior abscess (25 Nov 2018 09:40)      HPI:  73F, PMHx of Myelodysplastic syndrome, DM, presents to the ED with a complaint of abscess to Left shoulder. Pt states 1 week ago she noticed a abscess forming to her Left shoulder, associated with pain. The abscess has been gradually increasing and becoming worse. Pt states she went to her pharmacist 3 days ago and received Ichthammol ointment which lead to the abscess to burst, exposing pus. Pt stopped using the ointment and the abscess still persistent which lead to the ED visit. Pt also states she has a new abscess forming on her Left forearm that started 1 day ago. Pt admits to chills. Pt denies ever experiencing this before. Otherwise pt denies fevers, nausea, vomiting, abdominal pain, dysuria, CP, or SOB.    Pt takes Revlimid 5 mg daily (2 weeks on, 1 week off) and Procrit weekly (next Procrit dose is due on upcoming Tuesday (11/27/18). (24 Nov 2018 19:27)    Patient did not require PRBC transfusions for many years, then missed several doses of procrit in September and subsequently required transfusions.    ROS: as above    PAST MEDICAL & SURGICAL HISTORY:  MDS (myelodysplastic syndrome)  DM (diabetes mellitus)  No significant past surgical history    SOCIAL HISTORY: non-smoker    FAMILY HISTORY: negative    MEDICATIONS  (STANDING):  cefepime   IVPB 1000 milliGRAM(s) IV Intermittent every 12 hours  dextrose 50% Injectable 25 Gram(s) IV Push once  heparin  Injectable 5000 Unit(s) SubCutaneous every 8 hours  insulin lispro (HumaLOG) corrective regimen sliding scale   SubCutaneous three times a day before meals  lenalidomide 15 milliGRAM(s) Oral daily  pantoprazole    Tablet 40 milliGRAM(s) Oral before breakfast  vancomycin  IVPB      vancomycin  IVPB 750 milliGRAM(s) IV Intermittent every 24 hours    MEDICATIONS  (PRN):  glucagon  Injectable 1 milliGRAM(s) IntraMuscular once PRN Glucose LESS THAN 70 milligrams/deciliter  morphine  - Injectable 4 milliGRAM(s) IV Push every 4 hours PRN Moderate Pain (4 - 6)      Allergies    No Known Allergies    Intolerances        Vital Signs Last 24 Hrs  T(C): 37.3 (26 Nov 2018 07:34), Max: 37.6 (26 Nov 2018 03:30)  T(F): 99.1 (26 Nov 2018 07:34), Max: 99.6 (26 Nov 2018 03:30)  HR: 78 (26 Nov 2018 07:34) (78 - 102)  BP: 127/62 (26 Nov 2018 07:34) (102/62 - 150/68)  BP(mean): 73 (25 Nov 2018 23:30) (73 - 98)  RR: 18 (26 Nov 2018 07:34) (18 - 18)  SpO2: --    PHYSICAL EXAM  General: adult in NAD, appears well, sitting in chair eating dinner  HEENT: NC/AT  Neck: supple  CV: +S1, +S2  Lungs: positive air movement b/l ant lungs  Skin: no rashes and no petechiae, raised lesions under right breast, left forearm and a debrided lesion on left shoulder ( per pt improved from before), not typical boils  Neuro: alert and oriented X 4, no focal deficits      LABS:                          6.4    3.91  )-----------( 261      ( 26 Nov 2018 00:52 )             19.3     (S/p one unit of PRBCs.  Repeat hemoglobin of 8.4.)    Mean Cell Volume : 103.2 fL  Mean Cell Hemoglobin : 34.2 pg  Mean Cell Hemoglobin Concentration : 33.2 g/dL  Auto Neutrophil # : 2.09 K/uL  Auto Lymphocyte # : 1.07 K/uL  Auto Monocyte # : 0.28 K/uL  Auto Eosinophil # : 0.31 K/uL  Auto Basophil # : 0.07 K/uL  Auto Neutrophil % : 53.4 %  Auto Lymphocyte % : 27.4 %  Auto Monocyte % : 7.2 %  Auto Eosinophil % : 7.9 %  Auto Basophil % : 1.8 %      Serial CBC's  11-26 @ 00:52  Hct-19.3 / Hgb-6.4 / Plat-261 / RBC-1.87 / WBC-3.91  Serial CBC's  11-25 @ 14:20  Hct-23.2 / Hgb-7.5 / Plat-281 / RBC-2.25 / WBC-4.35  Serial CBC's  11-25 @ 11:47  Hct-21.1 / Hgb-6.8 / Plat-265 / RBC-2.03 / WBC-4.23  Serial CBC's  11-24 @ 16:30  Hct-27.8 / Hgb-9.1 / Plat-343 / RBC-2.75 / WBC-7.28      11-26    139  |  105  |  19  ----------------------------<  113<H>  4.3   |  20  |  1.5    Ca    8.2<L>      26 Nov 2018 00:52  Phos  2.6     11-26  Mg     1.5     11-26    TPro  6.9  /  Alb  4.4  /  TBili  0.3  /  DBili  x   /  AST  11  /  ALT  12  /  AlkPhos  97  11-24      PT/INR - ( 24 Nov 2018 19:55 )   PT: 12.40 sec;   INR: 1.08 ratio         PTT - ( 24 Nov 2018 19:55 )  PTT:25.8 sec

## 2018-11-27 ENCOUNTER — APPOINTMENT (OUTPATIENT)
Dept: INFUSION THERAPY | Facility: CLINIC | Age: 73
End: 2018-11-27

## 2018-11-27 ENCOUNTER — LABORATORY RESULT (OUTPATIENT)
Age: 73
End: 2018-11-27

## 2018-11-27 ENCOUNTER — APPOINTMENT (OUTPATIENT)
Dept: HEMATOLOGY ONCOLOGY | Facility: CLINIC | Age: 73
End: 2018-11-27

## 2018-11-27 VITALS
TEMPERATURE: 98 F | SYSTOLIC BLOOD PRESSURE: 129 MMHG | BODY MASS INDEX: 26.5 KG/M2 | RESPIRATION RATE: 18 BRPM | HEIGHT: 62 IN | HEART RATE: 80 BPM | DIASTOLIC BLOOD PRESSURE: 58 MMHG | WEIGHT: 144 LBS

## 2018-11-27 DIAGNOSIS — Z71.89 OTHER SPECIFIED COUNSELING: ICD-10-CM

## 2018-11-27 PROBLEM — D46.9 MYELODYSPLASTIC SYNDROME, UNSPECIFIED: Chronic | Status: ACTIVE | Noted: 2018-11-24

## 2018-11-27 PROBLEM — E11.9 TYPE 2 DIABETES MELLITUS WITHOUT COMPLICATIONS: Chronic | Status: ACTIVE | Noted: 2018-11-24

## 2018-11-27 LAB
-  AMPICILLIN/SULBACTAM: SIGNIFICANT CHANGE UP
-  AMPICILLIN/SULBACTAM: SIGNIFICANT CHANGE UP
-  CEFAZOLIN: SIGNIFICANT CHANGE UP
-  CEFAZOLIN: SIGNIFICANT CHANGE UP
-  CLINDAMYCIN: SIGNIFICANT CHANGE UP
-  CLINDAMYCIN: SIGNIFICANT CHANGE UP
-  DAPTOMYCIN: SIGNIFICANT CHANGE UP
-  DAPTOMYCIN: SIGNIFICANT CHANGE UP
-  ERYTHROMYCIN: SIGNIFICANT CHANGE UP
-  ERYTHROMYCIN: SIGNIFICANT CHANGE UP
-  GENTAMICIN: SIGNIFICANT CHANGE UP
-  GENTAMICIN: SIGNIFICANT CHANGE UP
-  LINEZOLID: SIGNIFICANT CHANGE UP
-  LINEZOLID: SIGNIFICANT CHANGE UP
-  OXACILLIN: SIGNIFICANT CHANGE UP
-  OXACILLIN: SIGNIFICANT CHANGE UP
-  PENICILLIN: SIGNIFICANT CHANGE UP
-  PENICILLIN: SIGNIFICANT CHANGE UP
-  RIFAMPIN: SIGNIFICANT CHANGE UP
-  RIFAMPIN: SIGNIFICANT CHANGE UP
-  TETRACYCLINE: SIGNIFICANT CHANGE UP
-  TETRACYCLINE: SIGNIFICANT CHANGE UP
-  TRIMETHOPRIM/SULFAMETHOXAZOLE: SIGNIFICANT CHANGE UP
-  TRIMETHOPRIM/SULFAMETHOXAZOLE: SIGNIFICANT CHANGE UP
-  VANCOMYCIN: SIGNIFICANT CHANGE UP
-  VANCOMYCIN: SIGNIFICANT CHANGE UP
HCT VFR BLD CALC: 27 %
HGB BLD-MCNC: 9 G/DL
MCHC RBC-ENTMCNC: 33 PG
MCHC RBC-ENTMCNC: 33.3 G/DL
MCV RBC AUTO: 98.9 FL
METHOD TYPE: SIGNIFICANT CHANGE UP
METHOD TYPE: SIGNIFICANT CHANGE UP
PLATELET # BLD AUTO: 309 K/UL
PMV BLD: 10.4 FL
RBC # BLD: 2.73 M/UL
RBC # FLD: 19.4 %
WBC # FLD AUTO: 3.94 K/UL

## 2018-11-27 RX ORDER — ERYTHROPOIETIN 10000 [IU]/ML
40000 INJECTION, SOLUTION INTRAVENOUS; SUBCUTANEOUS ONCE
Qty: 0 | Refills: 0 | Status: COMPLETED | OUTPATIENT
Start: 2018-11-27 | End: 2018-11-27

## 2018-11-27 RX ORDER — PREGABALIN 225 MG/1
1000 CAPSULE ORAL ONCE
Qty: 0 | Refills: 0 | Status: COMPLETED | OUTPATIENT
Start: 2018-11-27 | End: 2018-11-27

## 2018-11-27 RX ADMIN — ERYTHROPOIETIN 40000 UNIT(S): 10000 INJECTION, SOLUTION INTRAVENOUS; SUBCUTANEOUS at 13:38

## 2018-11-27 RX ADMIN — PREGABALIN 1000 MICROGRAM(S): 225 CAPSULE ORAL at 13:38

## 2018-11-29 DIAGNOSIS — L03.114 CELLULITIS OF LEFT UPPER LIMB: ICD-10-CM

## 2018-11-29 DIAGNOSIS — Z79.899 OTHER LONG TERM (CURRENT) DRUG THERAPY: ICD-10-CM

## 2018-11-29 DIAGNOSIS — R71.0 PRECIPITOUS DROP IN HEMATOCRIT: ICD-10-CM

## 2018-11-29 DIAGNOSIS — D46.9 MYELODYSPLASTIC SYNDROME, UNSPECIFIED: ICD-10-CM

## 2018-11-29 DIAGNOSIS — E11.9 TYPE 2 DIABETES MELLITUS WITHOUT COMPLICATIONS: ICD-10-CM

## 2018-11-29 DIAGNOSIS — L02.434: ICD-10-CM

## 2018-11-29 DIAGNOSIS — Z79.84 LONG TERM (CURRENT) USE OF ORAL HYPOGLYCEMIC DRUGS: ICD-10-CM

## 2018-11-29 DIAGNOSIS — L02.414 CUTANEOUS ABSCESS OF LEFT UPPER LIMB: ICD-10-CM

## 2018-11-30 LAB
CULTURE RESULTS: SIGNIFICANT CHANGE UP
CULTURE RESULTS: SIGNIFICANT CHANGE UP
ORGANISM # SPEC MICROSCOPIC CNT: SIGNIFICANT CHANGE UP
SPECIMEN SOURCE: SIGNIFICANT CHANGE UP
SPECIMEN SOURCE: SIGNIFICANT CHANGE UP

## 2018-12-03 ENCOUNTER — RX RENEWAL (OUTPATIENT)
Age: 73
End: 2018-12-03

## 2018-12-05 ENCOUNTER — APPOINTMENT (OUTPATIENT)
Dept: INFUSION THERAPY | Facility: CLINIC | Age: 73
End: 2018-12-05

## 2018-12-05 ENCOUNTER — LABORATORY RESULT (OUTPATIENT)
Age: 73
End: 2018-12-05

## 2018-12-05 ENCOUNTER — APPOINTMENT (OUTPATIENT)
Dept: SURGERY | Facility: CLINIC | Age: 73
End: 2018-12-05
Payer: MEDICAID

## 2018-12-05 VITALS
SYSTOLIC BLOOD PRESSURE: 128 MMHG | DIASTOLIC BLOOD PRESSURE: 72 MMHG | HEIGHT: 62 IN | WEIGHT: 142 LBS | BODY MASS INDEX: 26.13 KG/M2

## 2018-12-05 DIAGNOSIS — L02.419 CUTANEOUS ABSCESS OF LIMB, UNSPECIFIED: ICD-10-CM

## 2018-12-05 PROCEDURE — 99212 OFFICE O/P EST SF 10 MIN: CPT

## 2018-12-05 RX ORDER — FOLIC ACID 0.8 MG
500 TABLET ORAL
Qty: 10 | Refills: 0 | Status: COMPLETED | COMMUNITY
Start: 2018-11-21 | End: 2018-12-05

## 2018-12-05 RX ORDER — DIPHENOXYLATE HYDROCHLORIDE AND ATROPINE SULFATE 2.5; .025 MG/1; MG/1
2.5-0.025 TABLET ORAL 4 TIMES DAILY
Qty: 40 | Refills: 1 | Status: COMPLETED | COMMUNITY
Start: 2018-07-31 | End: 2018-12-05

## 2018-12-05 RX ORDER — DIPHENOXYLATE HYDROCHLORIDE AND ATROPINE SULFATE 2.5; .025 MG/1; MG/1
2.5-0.025 TABLET ORAL
Qty: 70 | Refills: 0 | Status: COMPLETED | COMMUNITY
Start: 2018-07-31 | End: 2018-12-05

## 2018-12-05 RX ORDER — ERYTHROPOIETIN 10000 [IU]/ML
40000 INJECTION, SOLUTION INTRAVENOUS; SUBCUTANEOUS ONCE
Qty: 0 | Refills: 0 | Status: COMPLETED | OUTPATIENT
Start: 2018-12-05 | End: 2018-12-05

## 2018-12-05 RX ADMIN — ERYTHROPOIETIN 40000 UNIT(S): 10000 INJECTION, SOLUTION INTRAVENOUS; SUBCUTANEOUS at 15:08

## 2018-12-06 LAB
HCT VFR BLD CALC: 27.2 %
HGB BLD-MCNC: 9 G/DL
MCHC RBC-ENTMCNC: 33.1 G/DL
MCHC RBC-ENTMCNC: 33.3 PG
MCV RBC AUTO: 100.7 FL
PLATELET # BLD AUTO: 258 K/UL
PMV BLD: 10.2 FL
RBC # BLD: 2.7 M/UL
RBC # FLD: 17.2 %
WBC # FLD AUTO: 4.24 K/UL

## 2018-12-08 NOTE — DISCUSSION/SUMMARY
[FreeTextEntry1] : Patient notified with low Magnesium level . will resume Mg Oxide one tab every 12 hr .\par Patient will repeat blood work in one week.

## 2018-12-08 NOTE — PHYSICAL EXAM
[Fully active, able to carry on all pre-disease performance without restriction] : Status 0 - Fully active, able to carry on all pre-disease performance without restriction [Normal] : grossly intact [de-identified] : pharynx clear, no exudates [de-identified] : big boil on the left scapula. dressing present.

## 2018-12-08 NOTE — REVIEW OF SYSTEMS
[Shortness Of Breath] : shortness of breath [Cough] : cough [Diarrhea] : diarrhea [Negative] : Allergic/Immunologic [FreeTextEntry6] : chronic, intermittent SOB

## 2018-12-08 NOTE — ASSESSMENT
[FreeTextEntry1] : Lowrisk myelodysplastic syndrome, on Revlimid and Procrit. Did not require blood transfusion for 2 years. \par and then needed transfusion again in 9/18 likely due to non-compliance with procrit\par S/p 2 units PRBC transfusion on 9/25/18. \par S/p 1 unit PRBC transfusion in 11/2018 during the hospitalization.\par \par Pt was also diagnosed with B12 deficiency.\par \par PLAN:\par Continue  Revlimid 5 mg daily 2 weeks on 1 week off.  Encouraged her to take  Imodium and also Lomotil if diarrhea is not controlled. CBC adequate today.\par \par Continue Procrit 58689 units weekly. Advised her to be compliant with weekly Procrit. CBC stable today. \par \par Continue B12 injections monthly.\par \par Ordered CMP, Mg levels. \par \par Follow up in 3 weeks.\par Pt seen and examined with \par

## 2018-12-08 NOTE — HISTORY OF PRESENT ILLNESS
[de-identified] : She missed on appointment for procrit last week.\par She starts her next cycle on 6/25/18\par C/o back pain radiating down her left which occurred when she bent to  something.\par No change in diarrhea.\par \par 7/31/18:\par Doing well. On Revlimid for more than 2yrs, 5mg 2weeks on 1 week off. She will be starting week on tonight. \par C/o diarrhea. On Imodium 2 pills AM and 2 pills PM. Doesn't help much with diarrhea. \par C/o nausea, abd pain. \par Colonoscopy in 2016 was normal. \par Did not have blood transfusion for over 2 years. \par On procrit [id number] weekly. Missed last week on 7/24/18 as she was out of town. She has been non compliant with weekly Procrit.\par Mammogram in 2017 was normal. \par \par 9/11/18\par pt is here for follow up.\par She feels the lomotil helps with the diarrhea.\par She was away for a few weeks and missed her procrit injections.\par No fever, abdominal  discomfort has been less since since use of lomotil.\par She started a new cycle 2 days ago.\par \par 10/2/18:\par She will start Revlimid tonight (week on). 2 weeks on, 1 week off. \par On ASA 81mg. \par Denies fever, nausea, vomiting, chest pain, SOB, abdominal pain, and bladder problems.\par C/o diarrhea. \par S/p 2 units PRBC transfusion on 9/25/18. \par On Procrit 91403 units weekly. Not compliant every week. \par C/o intermittent dizziness. \par \par 10/31/18\par Pt is here for follow up.\par C/O significant fatigue.\par Continues to have diarrhea, takes imodium and lomotil as needed.\par C/o dry cough, no fever.\par Cough started a month ago. It is worse in the mornings however over last week it has been constant all day.\par No chest pain.\par She was found to have low B12 levels and was started on B12 injections.\par \par 11/27/18:\par Doing well. Hospitalized for 3 days for cellulitis/abcess of the back s/p drainage and on Abx currently. Developed 3 days after Mg infusion as per pt. \par Denies nausea, vomiting, chest pain, SOB, abdominal pain, bowel and bladder problems.\par This is the week on Revlimid (week 1).\par S/p 1 unit PRBC transfusion in the hospital. \par On Procrit weekly  [de-identified] : \par

## 2018-12-11 ENCOUNTER — APPOINTMENT (OUTPATIENT)
Dept: INFUSION THERAPY | Facility: CLINIC | Age: 73
End: 2018-12-11

## 2018-12-18 ENCOUNTER — APPOINTMENT (OUTPATIENT)
Dept: INFUSION THERAPY | Facility: CLINIC | Age: 73
End: 2018-12-18

## 2018-12-19 ENCOUNTER — APPOINTMENT (OUTPATIENT)
Dept: INFUSION THERAPY | Facility: CLINIC | Age: 73
End: 2018-12-19

## 2018-12-19 ENCOUNTER — LABORATORY RESULT (OUTPATIENT)
Age: 73
End: 2018-12-19

## 2018-12-19 ENCOUNTER — APPOINTMENT (OUTPATIENT)
Dept: SURGERY | Facility: CLINIC | Age: 73
End: 2018-12-19
Payer: MEDICAID

## 2018-12-19 VITALS
SYSTOLIC BLOOD PRESSURE: 122 MMHG | WEIGHT: 138 LBS | HEIGHT: 62 IN | BODY MASS INDEX: 25.4 KG/M2 | DIASTOLIC BLOOD PRESSURE: 76 MMHG

## 2018-12-19 DIAGNOSIS — Z87.898 PERSONAL HISTORY OF OTHER SPECIFIED CONDITIONS: ICD-10-CM

## 2018-12-19 DIAGNOSIS — I10 ESSENTIAL (PRIMARY) HYPERTENSION: ICD-10-CM

## 2018-12-19 DIAGNOSIS — E11.9 TYPE 2 DIABETES MELLITUS W/OUT COMPLICATIONS: ICD-10-CM

## 2018-12-19 DIAGNOSIS — Z86.2 PERSONAL HISTORY OF DISEASES OF THE BLOOD AND BLOOD-FORMING ORGANS AND CERTAIN DISORDERS INVOLVING THE IMMUNE MECHANISM: ICD-10-CM

## 2018-12-19 DIAGNOSIS — Z86.39 PERSONAL HISTORY OF OTHER ENDOCRINE, NUTRITIONAL AND METABOLIC DISEASE: ICD-10-CM

## 2018-12-19 DIAGNOSIS — Z92.89 PERSONAL HISTORY OF OTHER MEDICAL TREATMENT: ICD-10-CM

## 2018-12-19 PROCEDURE — 99213 OFFICE O/P EST LOW 20 MIN: CPT

## 2018-12-19 RX ORDER — ERYTHROPOIETIN 10000 [IU]/ML
40000 INJECTION, SOLUTION INTRAVENOUS; SUBCUTANEOUS ONCE
Qty: 0 | Refills: 0 | Status: COMPLETED | OUTPATIENT
Start: 2018-12-19 | End: 2018-12-19

## 2018-12-19 RX ADMIN — ERYTHROPOIETIN 40000 UNIT(S): 10000 INJECTION, SOLUTION INTRAVENOUS; SUBCUTANEOUS at 15:30

## 2018-12-20 PROBLEM — Z86.39 HISTORY OF DIABETES MELLITUS: Status: RESOLVED | Noted: 2018-12-05 | Resolved: 2018-12-20

## 2018-12-20 LAB
HCT VFR BLD CALC: 25 %
HGB BLD-MCNC: 8.2 G/DL
MCHC RBC-ENTMCNC: 32.8 G/DL
MCHC RBC-ENTMCNC: 33.5 PG
MCV RBC AUTO: 102 FL
PLATELET # BLD AUTO: 370 K/UL
PMV BLD: 10.3 FL
RBC # BLD: 2.45 M/UL
RBC # FLD: 17.1 %
WBC # FLD AUTO: 6.85 K/UL

## 2018-12-27 ENCOUNTER — RX RENEWAL (OUTPATIENT)
Age: 73
End: 2018-12-27

## 2018-12-27 ENCOUNTER — APPOINTMENT (OUTPATIENT)
Dept: INFUSION THERAPY | Facility: CLINIC | Age: 73
End: 2018-12-27

## 2018-12-27 ENCOUNTER — LABORATORY RESULT (OUTPATIENT)
Age: 73
End: 2018-12-27

## 2018-12-27 ENCOUNTER — APPOINTMENT (OUTPATIENT)
Dept: HEMATOLOGY ONCOLOGY | Facility: CLINIC | Age: 73
End: 2018-12-27

## 2018-12-27 VITALS
DIASTOLIC BLOOD PRESSURE: 60 MMHG | WEIGHT: 138 LBS | BODY MASS INDEX: 25.4 KG/M2 | SYSTOLIC BLOOD PRESSURE: 113 MMHG | HEIGHT: 62 IN | HEART RATE: 75 BPM | TEMPERATURE: 98 F

## 2018-12-27 RX ORDER — PREGABALIN 225 MG/1
1000 CAPSULE ORAL ONCE
Qty: 0 | Refills: 0 | Status: COMPLETED | OUTPATIENT
Start: 2018-12-27 | End: 2018-12-27

## 2018-12-27 RX ORDER — ERYTHROPOIETIN 10000 [IU]/ML
40000 INJECTION, SOLUTION INTRAVENOUS; SUBCUTANEOUS ONCE
Qty: 0 | Refills: 0 | Status: COMPLETED | OUTPATIENT
Start: 2018-12-27 | End: 2018-12-27

## 2018-12-27 RX ADMIN — PREGABALIN 1000 MICROGRAM(S): 225 CAPSULE ORAL at 13:44

## 2018-12-27 RX ADMIN — ERYTHROPOIETIN 40000 UNIT(S): 10000 INJECTION, SOLUTION INTRAVENOUS; SUBCUTANEOUS at 13:44

## 2018-12-27 NOTE — ASSESSMENT
[FreeTextEntry1] : Lowrisk myelodysplastic syndrome, on Revlimid and Procrit. Did not require blood transfusion for 2 years. \par and then needed transfusion again in 9/18 likely due to non-compliance with procrit\par \par S/p 2 units PRBC transfusion on 9/25/18. \par S/p 1 unit PRBC transfusion in 11/2018 during the hospitalization.\par \par Pt was also diagnosed with B12 deficiency.\par \par PLAN:\par Continue  Revlimid 5 mg daily 2 weeks on 1 week off.  Encouraged her to take  Imodium and also Lomotil if diarrhea is not controlled. CBC today showed Hgb 7.2. Will arrange for 1 unit PRBC transfusion. Requiring frequent blood transfusions recently.\par \par Continue Procrit 19719 units weekly. Advised her to be compliant with weekly Procrit. \par \par Continue B12 injections monthly. Due today. \par \par Follow up in 3 weeks.\par Pt seen and examined with \par

## 2018-12-27 NOTE — PHYSICAL EXAM
[Fully active, able to carry on all pre-disease performance without restriction] : Status 0 - Fully active, able to carry on all pre-disease performance without restriction [Normal] : grossly intact [de-identified] : pharynx clear, no exudates [de-identified] : big boil on the left scapula. dressing present.

## 2018-12-27 NOTE — HISTORY OF PRESENT ILLNESS
[de-identified] : She missed on appointment for procrit last week.\par She starts her next cycle on 6/25/18\par C/o back pain radiating down her left which occurred when she bent to  something.\par No change in diarrhea.\par \par 7/31/18:\par Doing well. On Revlimid for more than 2yrs, 5mg 2weeks on 1 week off. She will be starting week on tonight. \par C/o diarrhea. On Imodium 2 pills AM and 2 pills PM. Doesn't help much with diarrhea. \par C/o nausea, abd pain. \par Colonoscopy in 2016 was normal. \par Did not have blood transfusion for over 2 years. \par On procrit [id number] weekly. Missed last week on 7/24/18 as she was out of town. She has been non compliant with weekly Procrit.\par Mammogram in 2017 was normal. \par \par 9/11/18\par pt is here for follow up.\par She feels the lomotil helps with the diarrhea.\par She was away for a few weeks and missed her procrit injections.\par No fever, abdominal  discomfort has been less since since use of lomotil.\par She started a new cycle 2 days ago.\par \par 10/2/18:\par She will start Revlimid tonight (week on). 2 weeks on, 1 week off. \par On ASA 81mg. \par Denies fever, nausea, vomiting, chest pain, SOB, abdominal pain, and bladder problems.\par C/o diarrhea. \par S/p 2 units PRBC transfusion on 9/25/18. \par On Procrit 74057 units weekly. Not compliant every week. \par C/o intermittent dizziness. \par \par 10/31/18\par Pt is here for follow up.\par C/O significant fatigue.\par Continues to have diarrhea, takes imodium and lomotil as needed.\par C/o dry cough, no fever.\par Cough started a month ago. It is worse in the mornings however over last week it has been constant all day.\par No chest pain.\par She was found to have low B12 levels and was started on B12 injections.\par \par 11/27/18:\par Doing well. Hospitalized for 3 days for cellulitis/abcess of the back s/p drainage and on Abx currently. Developed 3 days after Mg infusion as per pt. \par Denies nausea, vomiting, chest pain, SOB, abdominal pain, bowel and bladder problems.\par This is the week on Revlimid (week 1).\par S/p 1 unit PRBC transfusion in the hospital. \par On Procrit weekly \par \par 12/27/18:\par Doing well. No major complaints.\par Denies fever, nausea, vomiting, chest pain, SOB, abdominal pain, and bladder problems.\par On Revlimid 5 mg 2 weeks on 1 week off. This is the week off. She will start the week on sunday (12/30/18).\par Due for Procrit 28321 units today. \par Still has diarrhea. 4-5bm/day.\par On monthly B12 injections.  [de-identified] : \par

## 2018-12-28 ENCOUNTER — APPOINTMENT (OUTPATIENT)
Dept: INFUSION THERAPY | Facility: CLINIC | Age: 73
End: 2018-12-28

## 2019-01-02 ENCOUNTER — APPOINTMENT (OUTPATIENT)
Dept: INFUSION THERAPY | Facility: CLINIC | Age: 74
End: 2019-01-02

## 2019-01-02 LAB
ABO + RH PNL BLD: NORMAL
ALBUMIN SERPL ELPH-MCNC: 3.8 G/DL
ALP BLD-CCNC: 98 U/L
ALT SERPL-CCNC: 14 U/L
ANION GAP SERPL CALC-SCNC: 20 MMOL/L
AST SERPL-CCNC: 13 U/L
BILIRUB SERPL-MCNC: 0.3 MG/DL
BLD GP AB SCN SERPL QL: NORMAL
BUN SERPL-MCNC: 18 MG/DL
CALCIUM SERPL-MCNC: 8.6 MG/DL
CHLORIDE SERPL-SCNC: 100 MMOL/L
CO2 SERPL-SCNC: 18 MMOL/L
CREAT SERPL-MCNC: 1.5 MG/DL
FERRITIN SERPL-MCNC: 846 NG/ML
GLUCOSE SERPL-MCNC: 271 MG/DL
HCT VFR BLD CALC: 22.4 %
HGB BLD-MCNC: 7.2 G/DL
IRON SATN MFR SERPL: 38 %
IRON SERPL-MCNC: 94 UG/DL
MAGNESIUM SERPL-MCNC: 1.3 MG/DL
MCHC RBC-ENTMCNC: 32.1 G/DL
MCHC RBC-ENTMCNC: 33.2 PG
MCV RBC AUTO: 103.2 FL
PLATELET # BLD AUTO: 304 K/UL
PMV BLD: 10 FL
POTASSIUM SERPL-SCNC: 4.5 MMOL/L
PROT SERPL-MCNC: 6.7 G/DL
RBC # BLD: 2.17 M/UL
RBC # FLD: 16.9 %
SODIUM SERPL-SCNC: 138 MMOL/L
TIBC SERPL-MCNC: 248 UG/DL
UIBC SERPL-MCNC: 154 UG/DL
WBC # FLD AUTO: 4.24 K/UL

## 2019-01-08 ENCOUNTER — LABORATORY RESULT (OUTPATIENT)
Age: 74
End: 2019-01-08

## 2019-01-08 ENCOUNTER — APPOINTMENT (OUTPATIENT)
Dept: INFUSION THERAPY | Facility: CLINIC | Age: 74
End: 2019-01-08

## 2019-01-08 RX ORDER — ERYTHROPOIETIN 10000 [IU]/ML
40000 INJECTION, SOLUTION INTRAVENOUS; SUBCUTANEOUS ONCE
Qty: 0 | Refills: 0 | Status: COMPLETED | OUTPATIENT
Start: 2019-01-08 | End: 2019-01-08

## 2019-01-08 RX ADMIN — ERYTHROPOIETIN 40000 UNIT(S): 10000 INJECTION, SOLUTION INTRAVENOUS; SUBCUTANEOUS at 16:46

## 2019-01-09 LAB
HCT VFR BLD CALC: 25.7 %
HGB BLD-MCNC: 8.5 G/DL
MCHC RBC-ENTMCNC: 32.8 PG
MCHC RBC-ENTMCNC: 33.1 G/DL
MCV RBC AUTO: 99.2 FL
PLATELET # BLD AUTO: 267 K/UL
PMV BLD: 10.7 FL
RBC # BLD: 2.59 M/UL
RBC # FLD: 18.6 %
WBC # FLD AUTO: 4.05 K/UL

## 2019-01-15 ENCOUNTER — APPOINTMENT (OUTPATIENT)
Dept: INFUSION THERAPY | Facility: CLINIC | Age: 74
End: 2019-01-15

## 2019-01-17 ENCOUNTER — RX RENEWAL (OUTPATIENT)
Age: 74
End: 2019-01-17

## 2019-01-23 ENCOUNTER — APPOINTMENT (OUTPATIENT)
Dept: INFUSION THERAPY | Facility: CLINIC | Age: 74
End: 2019-01-23

## 2019-01-23 ENCOUNTER — LABORATORY RESULT (OUTPATIENT)
Age: 74
End: 2019-01-23

## 2019-01-23 RX ORDER — ERYTHROPOIETIN 10000 [IU]/ML
40000 INJECTION, SOLUTION INTRAVENOUS; SUBCUTANEOUS ONCE
Qty: 0 | Refills: 0 | Status: COMPLETED | OUTPATIENT
Start: 2019-01-23 | End: 2019-01-23

## 2019-01-23 RX ADMIN — ERYTHROPOIETIN 40000 UNIT(S): 10000 INJECTION, SOLUTION INTRAVENOUS; SUBCUTANEOUS at 16:21

## 2019-01-26 LAB
HCT VFR BLD CALC: 24.3 %
HGB BLD-MCNC: 8.2 G/DL
MCHC RBC-ENTMCNC: 33.3 PG
MCHC RBC-ENTMCNC: 33.7 G/DL
MCV RBC AUTO: 98.8 FL
PLATELET # BLD AUTO: 231 K/UL
PMV BLD: 10 FL
RBC # BLD: 2.46 M/UL
RBC # FLD: 18.2 %
WBC # FLD AUTO: 3.36 K/UL

## 2019-01-30 ENCOUNTER — APPOINTMENT (OUTPATIENT)
Dept: HEMATOLOGY ONCOLOGY | Facility: CLINIC | Age: 74
End: 2019-01-30

## 2019-01-30 ENCOUNTER — APPOINTMENT (OUTPATIENT)
Dept: SURGERY | Facility: CLINIC | Age: 74
End: 2019-01-30
Payer: MEDICARE

## 2019-01-30 ENCOUNTER — APPOINTMENT (OUTPATIENT)
Dept: INFUSION THERAPY | Facility: CLINIC | Age: 74
End: 2019-01-30

## 2019-01-30 ENCOUNTER — LABORATORY RESULT (OUTPATIENT)
Age: 74
End: 2019-01-30

## 2019-01-30 VITALS
BODY MASS INDEX: 25.76 KG/M2 | SYSTOLIC BLOOD PRESSURE: 138 MMHG | HEIGHT: 62 IN | WEIGHT: 140 LBS | DIASTOLIC BLOOD PRESSURE: 62 MMHG

## 2019-01-30 VITALS
HEART RATE: 80 BPM | HEIGHT: 62 IN | BODY MASS INDEX: 25.21 KG/M2 | TEMPERATURE: 96.7 F | WEIGHT: 137 LBS | DIASTOLIC BLOOD PRESSURE: 62 MMHG | SYSTOLIC BLOOD PRESSURE: 135 MMHG

## 2019-01-30 LAB
HCT VFR BLD CALC: 25.5 %
HGB BLD-MCNC: 8.5 G/DL
MCHC RBC-ENTMCNC: 33.3 G/DL
MCHC RBC-ENTMCNC: 33.6 PG
MCV RBC AUTO: 100.8 FL
PLATELET # BLD AUTO: 316 K/UL
PMV BLD: 10.2 FL
RBC # BLD: 2.53 M/UL
RBC # FLD: 17.7 %
WBC # FLD AUTO: 5.01 K/UL

## 2019-01-30 PROCEDURE — 99212 OFFICE O/P EST SF 10 MIN: CPT

## 2019-01-30 RX ORDER — PREGABALIN 225 MG/1
1000 CAPSULE ORAL ONCE
Qty: 0 | Refills: 0 | Status: COMPLETED | OUTPATIENT
Start: 2019-01-30 | End: 2019-01-30

## 2019-01-30 RX ORDER — ERYTHROPOIETIN 10000 [IU]/ML
60000 INJECTION, SOLUTION INTRAVENOUS; SUBCUTANEOUS ONCE
Qty: 0 | Refills: 0 | Status: COMPLETED | OUTPATIENT
Start: 2019-01-30 | End: 2019-01-30

## 2019-01-30 RX ADMIN — ERYTHROPOIETIN 60000 UNIT(S): 10000 INJECTION, SOLUTION INTRAVENOUS; SUBCUTANEOUS at 11:56

## 2019-01-30 RX ADMIN — PREGABALIN 1000 MICROGRAM(S): 225 CAPSULE ORAL at 11:56

## 2019-01-30 NOTE — HISTORY OF PRESENT ILLNESS
[de-identified] : 74yo female with PMHx of MDS and DM admitted for abscess of left forearm and carbuncle of left upper back. Patient underwent incision and drainage of abscesses 11/24/2018 and discharged home after 2 days of IV antibiotics. Cultures grew back +MRSA. Patient states that she completed her course of antibiotics. She was seen in clinic postoperatively that required minimal debridement of the wound. Collagenase was prescribed but they were unable to obtain the medication due to insurance issues. Her daughter has been doing the dressing changes BID. Denies fever/chills, drainage from the wound. Patient states that she has been showering with the dressing on and then changing the gauze after her showers.

## 2019-01-30 NOTE — PHYSICAL EXAM
[Normal Breath Sounds] : Normal breath sounds [Normal Heart Sounds] : normal heart sounds [No HSM] : no hepatosplenomegaly [Petechiae] : no petechiae [Skin Induration] : no induration [Alert] : alert [Oriented to Person] : oriented to person [Oriented to Place] : oriented to place [Calm] : calm [de-identified] : no acute distress [de-identified] : soft [de-identified] : left back wound 0.5x0.5cm with granulation tissue and fibrinous exudate around edges, minimal serosangious drainage on dressing, no purulent pockets

## 2019-01-30 NOTE — ASSESSMENT
[FreeTextEntry1] : 74yo female with left upper back carbuncle and left forearm abscess s/p incision and drainage 11/24/2018. Doing well.

## 2019-02-01 NOTE — ASSESSMENT
[FreeTextEntry1] : Lowrisk myelodysplastic syndrome, on Revlimid and Procrit. Did not require blood transfusion for 2 years. \par and then needed transfusion again in 9/18 likely due to non-compliance with procrit.\par \par S/p 2 units PRBC transfusion on 9/25/18. \par S/p 1 unit PRBC transfusion in 11/2018 during the hospitalization.\par \par Pt was also diagnosed with B12 deficiency.\par \par PLAN:\par Continue  Revlimid 5 mg daily 2 weeks on 1 week off.  Encouraged her to take  Imodium and also Lomotil if diarrhea is not controlled. CBC today showed Hgb 8.5  \par Hgb has been in the 8 range for quite some time, will increase Procrit to 60,000 units weekly.\par Advised her to be compliant with weekly Procrit. \par Continue B12 injections monthly. Due today. \par \par Follow up in 3 weeks, on 2//25.\par Pt seen and examined with \par

## 2019-02-01 NOTE — HISTORY OF PRESENT ILLNESS
[de-identified] : She missed on appointment for procrit last week.\par She starts her next cycle on 6/25/18\par C/o back pain radiating down her left which occurred when she bent to  something.\par No change in diarrhea.\par \par 7/31/18:\par Doing well. On Revlimid for more than 2yrs, 5mg 2weeks on 1 week off. She will be starting week on tonight. \par C/o diarrhea. On Imodium 2 pills AM and 2 pills PM. Doesn't help much with diarrhea. \par C/o nausea, abd pain. \par Colonoscopy in 2016 was normal. \par Did not have blood transfusion for over 2 years. \par On procrit [id number] weekly. Missed last week on 7/24/18 as she was out of town. She has been non compliant with weekly Procrit.\par Mammogram in 2017 was normal. \par \par 9/11/18\par pt is here for follow up.\par She feels the lomotil helps with the diarrhea.\par She was away for a few weeks and missed her procrit injections.\par No fever, abdominal  discomfort has been less since since use of lomotil.\par She started a new cycle 2 days ago.\par \par 10/2/18:\par She will start Revlimid tonight (week on). 2 weeks on, 1 week off. \par On ASA 81mg. \par Denies fever, nausea, vomiting, chest pain, SOB, abdominal pain, and bladder problems.\par C/o diarrhea. \par S/p 2 units PRBC transfusion on 9/25/18. \par On Procrit 71719 units weekly. Not compliant every week. \par C/o intermittent dizziness. \par \par 10/31/18\par Pt is here for follow up.\par C/O significant fatigue.\par Continues to have diarrhea, takes imodium and lomotil as needed.\par C/o dry cough, no fever.\par Cough started a month ago. It is worse in the mornings however over last week it has been constant all day.\par No chest pain.\par She was found to have low B12 levels and was started on B12 injections.\par \par 11/27/18:\par Doing well. Hospitalized for 3 days for cellulitis/abcess of the back s/p drainage and on Abx currently. Developed 3 days after Mg infusion as per pt. \par Denies nausea, vomiting, chest pain, SOB, abdominal pain, bowel and bladder problems.\par This is the week on Revlimid (week 1).\par S/p 1 unit PRBC transfusion in the hospital. \par On Procrit weekly \par \par 12/27/18:\par Doing well. No major complaints.\par Denies fever, nausea, vomiting, chest pain, SOB, abdominal pain, and bladder problems.\par On Revlimid 5 mg 2 weeks on 1 week off. This is the week off. She will start the week on sunday (12/30/18).\par Due for Procrit 30028 units today. \par Still has diarrhea. 4-5bm/day.\par On monthly B12 injections. \par \par 1/30/19:\par Patient here for follow up for MDS.  She is taking Revlimid 5 mg, 2 weeks on, 1 week off.  She will complete this current cycle on Saturday.  She has no new complaints today.  Patient denies cough, shortness of breath, denies fever, denies bone pain.\par  [de-identified] : \par

## 2019-02-01 NOTE — PHYSICAL EXAM
[Fully active, able to carry on all pre-disease performance without restriction] : Status 0 - Fully active, able to carry on all pre-disease performance without restriction [Normal] : grossly intact [de-identified] : pharynx clear, no exudates [de-identified] : big boil on the left scapula. dressing present.

## 2019-02-04 ENCOUNTER — RX RENEWAL (OUTPATIENT)
Age: 74
End: 2019-02-04

## 2019-02-05 ENCOUNTER — APPOINTMENT (OUTPATIENT)
Dept: INFUSION THERAPY | Facility: CLINIC | Age: 74
End: 2019-02-05

## 2019-02-05 ENCOUNTER — RX RENEWAL (OUTPATIENT)
Age: 74
End: 2019-02-05

## 2019-02-12 ENCOUNTER — LABORATORY RESULT (OUTPATIENT)
Age: 74
End: 2019-02-12

## 2019-02-12 ENCOUNTER — APPOINTMENT (OUTPATIENT)
Dept: INFUSION THERAPY | Facility: CLINIC | Age: 74
End: 2019-02-12

## 2019-02-12 ENCOUNTER — OUTPATIENT (OUTPATIENT)
Dept: OUTPATIENT SERVICES | Facility: HOSPITAL | Age: 74
LOS: 1 days | Discharge: HOME | End: 2019-02-12

## 2019-02-12 VITALS
HEART RATE: 78 BPM | TEMPERATURE: 97.8 F | RESPIRATION RATE: 18 BRPM | SYSTOLIC BLOOD PRESSURE: 122 MMHG | DIASTOLIC BLOOD PRESSURE: 58 MMHG

## 2019-02-12 DIAGNOSIS — D46.9 MYELODYSPLASTIC SYNDROME, UNSPECIFIED: ICD-10-CM

## 2019-02-12 DIAGNOSIS — Z79.899 OTHER LONG TERM (CURRENT) DRUG THERAPY: ICD-10-CM

## 2019-02-12 RX ORDER — ERYTHROPOIETIN 10000 [IU]/ML
60000 INJECTION, SOLUTION INTRAVENOUS; SUBCUTANEOUS ONCE
Qty: 0 | Refills: 0 | Status: DISCONTINUED | OUTPATIENT
Start: 2019-02-12 | End: 2019-02-12

## 2019-02-12 RX ORDER — ERYTHROPOIETIN 10000 [IU]/ML
60000 INJECTION, SOLUTION INTRAVENOUS; SUBCUTANEOUS ONCE
Qty: 0 | Refills: 0 | Status: COMPLETED | OUTPATIENT
Start: 2019-02-12 | End: 2019-02-12

## 2019-02-12 RX ADMIN — ERYTHROPOIETIN 60000 UNIT(S): 10000 INJECTION, SOLUTION INTRAVENOUS; SUBCUTANEOUS at 16:06

## 2019-02-13 LAB
HCT VFR BLD CALC: 25.6 %
HGB BLD-MCNC: 8.6 G/DL
MCHC RBC-ENTMCNC: 33.6 G/DL
MCHC RBC-ENTMCNC: 34.7 PG
MCV RBC AUTO: 103.2 FL
PLATELET # BLD AUTO: 230 K/UL
PMV BLD: 10 FL
RBC # BLD: 2.48 M/UL
RBC # FLD: 17.4 %
WBC # FLD AUTO: 2.69 K/UL

## 2019-02-19 ENCOUNTER — LABORATORY RESULT (OUTPATIENT)
Age: 74
End: 2019-02-19

## 2019-02-19 ENCOUNTER — APPOINTMENT (OUTPATIENT)
Dept: INFUSION THERAPY | Facility: CLINIC | Age: 74
End: 2019-02-19

## 2019-02-19 ENCOUNTER — OUTPATIENT (OUTPATIENT)
Dept: OUTPATIENT SERVICES | Facility: HOSPITAL | Age: 74
LOS: 1 days | Discharge: HOME | End: 2019-02-19

## 2019-02-19 VITALS
SYSTOLIC BLOOD PRESSURE: 107 MMHG | RESPIRATION RATE: 18 BRPM | TEMPERATURE: 98.6 F | HEART RATE: 90 BPM | DIASTOLIC BLOOD PRESSURE: 56 MMHG

## 2019-02-19 LAB
HCT VFR BLD CALC: 28.5 %
HGB BLD-MCNC: 9.4 G/DL
MCHC RBC-ENTMCNC: 33 G/DL
MCHC RBC-ENTMCNC: 34.7 PG
MCV RBC AUTO: 105.2 FL
PLATELET # BLD AUTO: 365 K/UL
PMV BLD: 10.3 FL
RBC # BLD: 2.71 M/UL
RBC # FLD: 16.7 %
WBC # FLD AUTO: 4.63 K/UL

## 2019-02-19 RX ORDER — ERYTHROPOIETIN 10000 [IU]/ML
60000 INJECTION, SOLUTION INTRAVENOUS; SUBCUTANEOUS ONCE
Qty: 0 | Refills: 0 | Status: COMPLETED | OUTPATIENT
Start: 2019-02-19 | End: 2019-02-19

## 2019-02-19 RX ORDER — ERYTHROPOIETIN 10000 [IU]/ML
60000 INJECTION, SOLUTION INTRAVENOUS; SUBCUTANEOUS ONCE
Qty: 0 | Refills: 0 | Status: DISCONTINUED | OUTPATIENT
Start: 2019-02-19 | End: 2019-02-19

## 2019-02-19 RX ADMIN — ERYTHROPOIETIN 60000 UNIT(S): 10000 INJECTION, SOLUTION INTRAVENOUS; SUBCUTANEOUS at 17:12

## 2019-02-20 ENCOUNTER — APPOINTMENT (OUTPATIENT)
Dept: SURGERY | Facility: CLINIC | Age: 74
End: 2019-02-20
Payer: MEDICARE

## 2019-02-20 VITALS
WEIGHT: 138 LBS | HEIGHT: 62 IN | DIASTOLIC BLOOD PRESSURE: 68 MMHG | SYSTOLIC BLOOD PRESSURE: 124 MMHG | BODY MASS INDEX: 25.4 KG/M2

## 2019-02-20 PROCEDURE — 99213 OFFICE O/P EST LOW 20 MIN: CPT | Mod: 25

## 2019-02-20 PROCEDURE — 10060 I&D ABSCESS SIMPLE/SINGLE: CPT

## 2019-02-20 NOTE — REVIEW OF SYSTEMS
[Fever] : no fever [Skin Wound] : skin wound [Negative] : Gastrointestinal [de-identified] : left upper back wound

## 2019-02-20 NOTE — HISTORY OF PRESENT ILLNESS
[de-identified] : 72yo female with PMHx of MDS and DM admitted for abscess of left forearm and carbuncle of left upper back. Patient underwent incision and drainage of abscesses 11/24/2018 and discharged home after 2 days of IV antibiotics. Cultures grew back +MRSA. Patient states that she completed her course of antibiotics. She was seen in clinic postoperatively that required minimal debridement of the wound. Collagenase was prescribed but they were unable to obtain the medication due to insurance issues. Her daughter has been doing the dressing changes BID. Denies drainage from the wound. Patient states that she has been showering with the dressing on and then changing the gauze after her showers. Since her last visit, the patient reports another collection on the left upper back. Denies fever/chills or drainage.

## 2019-02-20 NOTE — PHYSICAL EXAM
[Normal Breath Sounds] : Normal breath sounds [Normal Heart Sounds] : normal heart sounds [No HSM] : no hepatosplenomegaly [Petechiae] : no petechiae [Alert] : alert [Oriented to Person] : oriented to person [Oriented to Place] : oriented to place [Calm] : calm [de-identified] : no acute distress [de-identified] : soft [de-identified] : left back wound 0.25x0.25cm spot of blood with well-healing surround depigmented skin 1cm perimeter, 0.5x0.5cm lesion just left of midline upper back with erupting head, moderately fluctuant and tender, no erythema, minimal induration

## 2019-02-20 NOTE — ASSESSMENT
[FreeTextEntry1] : 74yo female with left upper back carbuncle and left forearm abscess s/p incision and drainage 11/24/2018 that both healed. Now presents with another very small abscess on left upper back. \par -incision and drainage in office\par -daily dressing changes - instructed to remove dressing and shower, then replace bandage\par -return in 2 weeks for wound check

## 2019-02-21 DIAGNOSIS — D46.9 MYELODYSPLASTIC SYNDROME, UNSPECIFIED: ICD-10-CM

## 2019-02-27 ENCOUNTER — RX RENEWAL (OUTPATIENT)
Age: 74
End: 2019-02-27

## 2019-02-28 ENCOUNTER — APPOINTMENT (OUTPATIENT)
Dept: HEMATOLOGY ONCOLOGY | Facility: CLINIC | Age: 74
End: 2019-02-28

## 2019-03-04 ENCOUNTER — APPOINTMENT (OUTPATIENT)
Dept: HEMATOLOGY ONCOLOGY | Facility: CLINIC | Age: 74
End: 2019-03-04

## 2019-03-04 ENCOUNTER — APPOINTMENT (OUTPATIENT)
Dept: INFUSION THERAPY | Facility: CLINIC | Age: 74
End: 2019-03-04

## 2019-03-04 ENCOUNTER — LABORATORY RESULT (OUTPATIENT)
Age: 74
End: 2019-03-04

## 2019-03-04 VITALS
HEART RATE: 92 BPM | HEIGHT: 62 IN | WEIGHT: 139 LBS | BODY MASS INDEX: 25.58 KG/M2 | DIASTOLIC BLOOD PRESSURE: 58 MMHG | SYSTOLIC BLOOD PRESSURE: 123 MMHG | TEMPERATURE: 99.1 F

## 2019-03-04 DIAGNOSIS — J34.89 OTHER SPECIFIED DISORDERS OF NOSE AND NASAL SINUSES: ICD-10-CM

## 2019-03-04 LAB
HCT VFR BLD CALC: 24.4 %
HGB BLD-MCNC: 8.2 G/DL
MCHC RBC-ENTMCNC: 33.6 G/DL
MCHC RBC-ENTMCNC: 35.5 PG
MCV RBC AUTO: 105.6 FL
PLATELET # BLD AUTO: 273 K/UL
PMV BLD: 9.9 FL
RBC # BLD: 2.31 M/UL
RBC # FLD: 15.8 %
WBC # FLD AUTO: 4.09 K/UL

## 2019-03-04 RX ORDER — ERYTHROPOIETIN 10000 [IU]/ML
60000 INJECTION, SOLUTION INTRAVENOUS; SUBCUTANEOUS ONCE
Qty: 0 | Refills: 0 | Status: COMPLETED | OUTPATIENT
Start: 2019-03-04 | End: 2019-03-04

## 2019-03-04 RX ADMIN — ERYTHROPOIETIN 60000 UNIT(S): 10000 INJECTION, SOLUTION INTRAVENOUS; SUBCUTANEOUS at 17:05

## 2019-03-05 ENCOUNTER — APPOINTMENT (OUTPATIENT)
Dept: INFUSION THERAPY | Facility: CLINIC | Age: 74
End: 2019-03-05

## 2019-03-06 ENCOUNTER — APPOINTMENT (OUTPATIENT)
Dept: SURGERY | Facility: CLINIC | Age: 74
End: 2019-03-06

## 2019-03-08 NOTE — HISTORY OF PRESENT ILLNESS
[de-identified] : She missed on appointment for procrit last week.\par She starts her next cycle on 6/25/18\par C/o back pain radiating down her left which occurred when she bent to  something.\par No change in diarrhea.\par \par 7/31/18:\par Doing well. On Revlimid for more than 2yrs, 5mg 2weeks on 1 week off. She will be starting week on tonight. \par C/o diarrhea. On Imodium 2 pills AM and 2 pills PM. Doesn't help much with diarrhea. \par C/o nausea, abd pain. \par Colonoscopy in 2016 was normal. \par Did not have blood transfusion for over 2 years. \par On procrit [id number] weekly. Missed last week on 7/24/18 as she was out of town. She has been non compliant with weekly Procrit.\par Mammogram in 2017 was normal. \par \par 9/11/18\par pt is here for follow up.\par She feels the lomotil helps with the diarrhea.\par She was away for a few weeks and missed her procrit injections.\par No fever, abdominal  discomfort has been less since since use of lomotil.\par She started a new cycle 2 days ago.\par \par 10/2/18:\par She will start Revlimid tonight (week on). 2 weeks on, 1 week off. \par On ASA 81mg. \par Denies fever, nausea, vomiting, chest pain, SOB, abdominal pain, and bladder problems.\par C/o diarrhea. \par S/p 2 units PRBC transfusion on 9/25/18. \par On Procrit 89711 units weekly. Not compliant every week. \par C/o intermittent dizziness. \par \par 10/31/18\par Pt is here for follow up.\par C/O significant fatigue.\par Continues to have diarrhea, takes imodium and lomotil as needed.\par C/o dry cough, no fever.\par Cough started a month ago. It is worse in the mornings however over last week it has been constant all day.\par No chest pain.\par She was found to have low B12 levels and was started on B12 injections.\par \par 11/27/18:\par Doing well. Hospitalized for 3 days for cellulitis/abcess of the back s/p drainage and on Abx currently. Developed 3 days after Mg infusion as per pt. \par Denies nausea, vomiting, chest pain, SOB, abdominal pain, bowel and bladder problems.\par This is the week on Revlimid (week 1).\par S/p 1 unit PRBC transfusion in the hospital. \par On Procrit weekly \par \par 12/27/18:\par Doing well. No major complaints.\par Denies fever, nausea, vomiting, chest pain, SOB, abdominal pain, and bladder problems.\par On Revlimid 5 mg 2 weeks on 1 week off. This is the week off. She will start the week on sunday (12/30/18).\par Due for Procrit 69907 units today. \par Still has diarrhea. 4-5bm/day.\par On monthly B12 injections. \par \par 1/30/19:\par Patient here for follow up for MDS.  She is taking Revlimid 5 mg, 2 weeks on, 1 week off.  She will complete this current cycle on Saturday.  She has no new complaints today.  Patient denies cough, shortness of breath, denies fever, denies bone pain.\par \par 03/04/2019\par Patient is here for a follow up visit. She complaints of severe pain in her left shoulder and has had abscess drained 2 weeks ago. However the pain is worse and she has purulent discharge on examination. She also has Nasal sores that are painful. Culture from 11/2018 showed MRSA.  Denies Fever. \par She also complaints of swelling in her right leg. Not tender and not erythematous and negative Gong;s sign. \par Her diarrhea with Revlimid is ongoing and Imodium and Lomotil helps but not everyday. \par She is on 60,000 units of procrit weekly and Revlimid 5 mg 2 weeks on 1 week off.  [de-identified] : \par

## 2019-03-08 NOTE — ASSESSMENT
[FreeTextEntry1] : Lowrisk myelodysplastic syndrome, on Revlimid and Procrit. Did not require blood transfusion for 2 years. \par and then needed transfusion again in 9/18 likely due to non-compliance with procrit.\par \par S/p 2 units PRBC transfusion on 9/25/18. \par S/p 1 unit PRBC transfusion in 11/2018 during the hospitalization.\par \par Pt was also diagnosed with B12 deficiency.\par \par PLAN:\par #Left scapular region abscess. S/p drainage 2 weeks ago but still has purulent discharge. Very tender. \par Culture from 11/2018 showed MRSA sensitive to clindamycin and Doxy. Will start Doxycycline 100 mg q12h for 10 days. \par -Bactroban Ointment for nasal sores.\par _referral to ID given. \par -Follow up with Surgery for drainage. Appointment this Thursday. \par -Communicated in the event of fevers >100.8 she needs to go ER. \par \par # Right leg swelling: Not tender. No erythema. Since she is on Revlimid, concern is for clots. Will get US duplex. Prescription given \par \par #MDS:CBC today showed Hgb 8.3 \par Will continue with Procrit 60,000 units weekly for 4 doses. In the event HB is not improving will change it to 80,000 in a month.\par On  Revlimid 5 mg daily 2 weeks on 1 week off.  Encouraged her to take  Imodium and also Lomotil if diarrhea is not controlled. \par Continue B12 injections monthly. Due today. \par \par Follow up in 3 weeks\par Pt seen and examined with \par

## 2019-03-08 NOTE — PHYSICAL EXAM
[Fully active, able to carry on all pre-disease performance without restriction] : Status 0 - Fully active, able to carry on all pre-disease performance without restriction [Normal] : grossly intact [de-identified] : pharynx clear, no exudates [de-identified] : Abscess draining purulent fluid, left scapular region. tender to touch.  [de-identified] : Abscess draining purulent fluid, left scapular region. tender to touch.

## 2019-03-13 ENCOUNTER — APPOINTMENT (OUTPATIENT)
Dept: INFUSION THERAPY | Facility: CLINIC | Age: 74
End: 2019-03-13

## 2019-03-18 ENCOUNTER — RX RENEWAL (OUTPATIENT)
Age: 74
End: 2019-03-18

## 2019-03-19 ENCOUNTER — FORM ENCOUNTER (OUTPATIENT)
Age: 74
End: 2019-03-19

## 2019-03-20 ENCOUNTER — OUTPATIENT (OUTPATIENT)
Dept: OUTPATIENT SERVICES | Facility: HOSPITAL | Age: 74
LOS: 1 days | Discharge: HOME | End: 2019-03-20
Payer: MEDICARE

## 2019-03-20 ENCOUNTER — LABORATORY RESULT (OUTPATIENT)
Age: 74
End: 2019-03-20

## 2019-03-20 ENCOUNTER — APPOINTMENT (OUTPATIENT)
Dept: INFUSION THERAPY | Facility: CLINIC | Age: 74
End: 2019-03-20

## 2019-03-20 VITALS
RESPIRATION RATE: 18 BRPM | TEMPERATURE: 98 F | HEART RATE: 86 BPM | SYSTOLIC BLOOD PRESSURE: 131 MMHG | DIASTOLIC BLOOD PRESSURE: 91 MMHG

## 2019-03-20 PROCEDURE — 93925 LOWER EXTREMITY STUDY: CPT | Mod: 26

## 2019-03-20 RX ORDER — ERYTHROPOIETIN 10000 [IU]/ML
60000 INJECTION, SOLUTION INTRAVENOUS; SUBCUTANEOUS ONCE
Qty: 0 | Refills: 0 | Status: COMPLETED | OUTPATIENT
Start: 2019-03-20 | End: 2019-03-20

## 2019-03-20 RX ORDER — PREGABALIN 225 MG/1
1000 CAPSULE ORAL ONCE
Qty: 0 | Refills: 0 | Status: COMPLETED | OUTPATIENT
Start: 2019-03-20 | End: 2019-03-20

## 2019-03-20 RX ADMIN — ERYTHROPOIETIN 60000 UNIT(S): 10000 INJECTION, SOLUTION INTRAVENOUS; SUBCUTANEOUS at 15:42

## 2019-03-20 RX ADMIN — PREGABALIN 1000 MICROGRAM(S): 225 CAPSULE ORAL at 15:54

## 2019-03-22 LAB
HCT VFR BLD CALC: 26.7 %
HGB BLD-MCNC: 9.2 G/DL
MCHC RBC-ENTMCNC: 34.5 G/DL
MCHC RBC-ENTMCNC: 35.4 PG
MCV RBC AUTO: 102.7 FL
PLATELET # BLD AUTO: 335 K/UL
PMV BLD: 10.6 FL
RBC # BLD: 2.6 M/UL
RBC # FLD: 14.7 %
WBC # FLD AUTO: 5.42 K/UL

## 2019-03-26 DIAGNOSIS — I70.211 ATHEROSCLEROSIS OF NATIVE ARTERIES OF EXTREMITIES WITH INTERMITTENT CLAUDICATION, RIGHT LEG: ICD-10-CM

## 2019-03-27 ENCOUNTER — APPOINTMENT (OUTPATIENT)
Dept: INFUSION THERAPY | Facility: CLINIC | Age: 74
End: 2019-03-27

## 2019-04-03 ENCOUNTER — APPOINTMENT (OUTPATIENT)
Dept: HEMATOLOGY ONCOLOGY | Facility: CLINIC | Age: 74
End: 2019-04-03

## 2019-04-03 ENCOUNTER — LABORATORY RESULT (OUTPATIENT)
Age: 74
End: 2019-04-03

## 2019-04-03 ENCOUNTER — APPOINTMENT (OUTPATIENT)
Dept: INFUSION THERAPY | Facility: CLINIC | Age: 74
End: 2019-04-03

## 2019-04-03 VITALS
SYSTOLIC BLOOD PRESSURE: 113 MMHG | WEIGHT: 139 LBS | RESPIRATION RATE: 18 BRPM | TEMPERATURE: 96.6 F | DIASTOLIC BLOOD PRESSURE: 54 MMHG | HEIGHT: 62 IN | BODY MASS INDEX: 25.58 KG/M2 | HEART RATE: 94 BPM

## 2019-04-03 DIAGNOSIS — L02.212 CUTANEOUS ABSCESS OF BACK [ANY PART, EXCEPT BUTTOCK]: ICD-10-CM

## 2019-04-03 RX ORDER — ERYTHROPOIETIN 10000 [IU]/ML
60000 INJECTION, SOLUTION INTRAVENOUS; SUBCUTANEOUS ONCE
Qty: 0 | Refills: 0 | Status: COMPLETED | OUTPATIENT
Start: 2019-04-03 | End: 2019-04-03

## 2019-04-03 RX ADMIN — ERYTHROPOIETIN 60000 UNIT(S): 10000 INJECTION, SOLUTION INTRAVENOUS; SUBCUTANEOUS at 10:40

## 2019-04-05 ENCOUNTER — RX RENEWAL (OUTPATIENT)
Age: 74
End: 2019-04-05

## 2019-04-05 DIAGNOSIS — E83.42 HYPOMAGNESEMIA: ICD-10-CM

## 2019-04-05 LAB
ALBUMIN SERPL ELPH-MCNC: 3.9 G/DL
ALP BLD-CCNC: 71 U/L
ALT SERPL-CCNC: 16 U/L
ANION GAP SERPL CALC-SCNC: 15 MMOL/L
AST SERPL-CCNC: 10 U/L
BILIRUB SERPL-MCNC: 0.3 MG/DL
BUN SERPL-MCNC: 16 MG/DL
CALCIUM SERPL-MCNC: 9 MG/DL
CHLORIDE SERPL-SCNC: 104 MMOL/L
CO2 SERPL-SCNC: 20 MMOL/L
CREAT SERPL-MCNC: 1.3 MG/DL
GLUCOSE SERPL-MCNC: 280 MG/DL
HCT VFR BLD CALC: 25.1 %
HGB BLD-MCNC: 8 G/DL
MAGNESIUM SERPL-MCNC: 1.4 MG/DL
MCHC RBC-ENTMCNC: 31.9 G/DL
MCHC RBC-ENTMCNC: 35.1 PG
MCV RBC AUTO: 110.1 FL
PLATELET # BLD AUTO: 326 K/UL
PMV BLD: 10.2 FL
POTASSIUM SERPL-SCNC: 4.6 MMOL/L
PROT SERPL-MCNC: 6.2 G/DL
RBC # BLD: 2.28 M/UL
RBC # FLD: 15.5 %
SODIUM SERPL-SCNC: 139 MMOL/L
WBC # FLD AUTO: 4.58 K/UL

## 2019-04-06 NOTE — PHYSICAL EXAM
[Fully active, able to carry on all pre-disease performance without restriction] : Status 0 - Fully active, able to carry on all pre-disease performance without restriction [Normal] : grossly intact [de-identified] : pharynx clear, no exudates [de-identified] : well healed scar left scapula, smaller scar completely dry, no swelling, discoloration or drainage.

## 2019-04-06 NOTE — REVIEW OF SYSTEMS
[Diarrhea] : diarrhea [Negative] : Allergic/Immunologic [Shortness Of Breath] : no shortness of breath [Cough] : no cough

## 2019-04-06 NOTE — HISTORY OF PRESENT ILLNESS
[de-identified] : She missed on appointment for procrit last week.\par She starts her next cycle on 6/25/18\par C/o back pain radiating down her left which occurred when she bent to  something.\par No change in diarrhea.\par \par 7/31/18:\par Doing well. On Revlimid for more than 2yrs, 5mg 2weeks on 1 week off. She will be starting week on tonight. \par C/o diarrhea. On Imodium 2 pills AM and 2 pills PM. Doesn't help much with diarrhea. \par C/o nausea, abd pain. \par Colonoscopy in 2016 was normal. \par Did not have blood transfusion for over 2 years. \par On procrit [id number] weekly. Missed last week on 7/24/18 as she was out of town. She has been non compliant with weekly Procrit.\par Mammogram in 2017 was normal. \par \par 9/11/18\par pt is here for follow up.\par She feels the lomotil helps with the diarrhea.\par She was away for a few weeks and missed her procrit injections.\par No fever, abdominal  discomfort has been less since since use of lomotil.\par She started a new cycle 2 days ago.\par \par 10/2/18:\par She will start Revlimid tonight (week on). 2 weeks on, 1 week off. \par On ASA 81mg. \par Denies fever, nausea, vomiting, chest pain, SOB, abdominal pain, and bladder problems.\par C/o diarrhea. \par S/p 2 units PRBC transfusion on 9/25/18. \par On Procrit 53387 units weekly. Not compliant every week. \par C/o intermittent dizziness. \par \par 10/31/18\par Pt is here for follow up.\par C/O significant fatigue.\par Continues to have diarrhea, takes imodium and lomotil as needed.\par C/o dry cough, no fever.\par Cough started a month ago. It is worse in the mornings however over last week it has been constant all day.\par No chest pain.\par She was found to have low B12 levels and was started on B12 injections.\par \par 11/27/18:\par Doing well. Hospitalized for 3 days for cellulitis/abcess of the back s/p drainage and on Abx currently. Developed 3 days after Mg infusion as per pt. \par Denies nausea, vomiting, chest pain, SOB, abdominal pain, bowel and bladder problems.\par This is the week on Revlimid (week 1).\par S/p 1 unit PRBC transfusion in the hospital. \par On Procrit weekly \par \par 12/27/18:\par Doing well. No major complaints.\par Denies fever, nausea, vomiting, chest pain, SOB, abdominal pain, and bladder problems.\par On Revlimid 5 mg 2 weeks on 1 week off. This is the week off. She will start the week on sunday (12/30/18).\par Due for Procrit 68929 units today. \par Still has diarrhea. 4-5bm/day.\par On monthly B12 injections. \par \par 1/30/19:\par Patient here for follow up for MDS.  She is taking Revlimid 5 mg, 2 weeks on, 1 week off.  She will complete this current cycle on Saturday.  She has no new complaints today.  Patient denies cough, shortness of breath, denies fever, denies bone pain.\par \par 03/04/2019\par Patient is here for a follow up visit. She complaints of severe pain in her left shoulder and has had abscess drained 2 weeks ago. However the pain is worse and she has purulent discharge on examination. She also has Nasal sores that are painful. Culture from 11/2018 showed MRSA.  Denies Fever. \par She also complaints of swelling in her right leg. Not tender and not erythematous and negative Gong;s sign. \par Her diarrhea with Revlimid is ongoing and Imodium and Lomotil helps but not everyday. \par She is on 60,000 units of procrit weekly and Revlimid 5 mg 2 weeks on 1 week off.\par \par 4/4/19:\par Patient here for follow up visit.  She is feeling well, no new complaints.  The abscess on her left upper back has resolved.  She denies pain, fever or drainage.  She completed antibiotics about 1 week ago.  Patient denies cough, shortness of breath, denies bone pain.  She is taking her Revlimid 5 mg daily, 2 weeks on, 1 week off.\par   [de-identified] : \par

## 2019-04-06 NOTE — ASSESSMENT
[FreeTextEntry1] : Lowrisk myelodysplastic syndrome, on Revlimid and Procrit. Did not require blood transfusion for 2 years. \par and then needed transfusion again in 9/18 likely due to non-compliance with procrit.\par \par S/p 2 units PRBC transfusion on 9/25/18. \par S/p 1 unit PRBC transfusion in 11/2018 during the hospitalization.\par \par Pt was also diagnosed with B12 deficiency.\par \par PLAN:\par #Left scapular region abscess, resolved.  \par #MDS:CBC today showed Hgb 8. \par Will continue with Procrit 60,000 units weekly for another 2 doses. In the event HB is not improving will change it to 80,000 in 2 weeks.\par On  Revlimid 5 mg daily 2 weeks on 1 week off.  Encouraged her to take  Imodium and also Lomotil if diarrhea is not controlled. \par Continue B12 injections monthly. Due today.\par CMP, MG today. \par \par Follow up with Dr. Wade in 3 weeks.\par

## 2019-04-09 ENCOUNTER — RX RENEWAL (OUTPATIENT)
Age: 74
End: 2019-04-09

## 2019-04-10 ENCOUNTER — APPOINTMENT (OUTPATIENT)
Dept: INFUSION THERAPY | Facility: CLINIC | Age: 74
End: 2019-04-10

## 2019-04-17 ENCOUNTER — LABORATORY RESULT (OUTPATIENT)
Age: 74
End: 2019-04-17

## 2019-04-17 ENCOUNTER — APPOINTMENT (OUTPATIENT)
Dept: INFUSION THERAPY | Facility: CLINIC | Age: 74
End: 2019-04-17

## 2019-04-17 VITALS
DIASTOLIC BLOOD PRESSURE: 52 MMHG | RESPIRATION RATE: 18 BRPM | TEMPERATURE: 98.8 F | SYSTOLIC BLOOD PRESSURE: 105 MMHG | HEART RATE: 90 BPM

## 2019-04-17 RX ORDER — ERYTHROPOIETIN 10000 [IU]/ML
60000 INJECTION, SOLUTION INTRAVENOUS; SUBCUTANEOUS ONCE
Qty: 0 | Refills: 0 | Status: COMPLETED | OUTPATIENT
Start: 2019-04-17 | End: 2019-04-17

## 2019-04-17 RX ADMIN — ERYTHROPOIETIN 60000 UNIT(S): 10000 INJECTION, SOLUTION INTRAVENOUS; SUBCUTANEOUS at 16:55

## 2019-04-22 LAB
HCT VFR BLD CALC: 23.5 %
HGB BLD-MCNC: 7.6 G/DL
MCHC RBC-ENTMCNC: 32.3 G/DL
MCHC RBC-ENTMCNC: 35.3 PG
MCV RBC AUTO: 109.3 FL
PLATELET # BLD AUTO: 219 K/UL
PMV BLD: 10.2 FL
RBC # BLD: 2.15 M/UL
RBC # FLD: 15.4 %
WBC # FLD AUTO: 2.76 K/UL

## 2019-04-23 ENCOUNTER — APPOINTMENT (OUTPATIENT)
Dept: HEMATOLOGY ONCOLOGY | Facility: CLINIC | Age: 74
End: 2019-04-23

## 2019-04-23 ENCOUNTER — APPOINTMENT (OUTPATIENT)
Dept: INFUSION THERAPY | Facility: CLINIC | Age: 74
End: 2019-04-23

## 2019-04-23 ENCOUNTER — LABORATORY RESULT (OUTPATIENT)
Age: 74
End: 2019-04-23

## 2019-04-23 VITALS
BODY MASS INDEX: 25.95 KG/M2 | WEIGHT: 141 LBS | DIASTOLIC BLOOD PRESSURE: 55 MMHG | SYSTOLIC BLOOD PRESSURE: 112 MMHG | RESPIRATION RATE: 18 BRPM | HEART RATE: 86 BPM | HEIGHT: 62 IN | TEMPERATURE: 97.8 F

## 2019-04-23 LAB
HCT VFR BLD CALC: 23.8 %
HGB BLD-MCNC: 7.8 G/DL
MCHC RBC-ENTMCNC: 32.8 G/DL
MCHC RBC-ENTMCNC: 35.8 PG
MCV RBC AUTO: 109.2 FL
PLATELET # BLD AUTO: 277 K/UL
PMV BLD: 10.5 FL
RBC # BLD: 2.18 M/UL
RBC # FLD: 15.3 %
WBC # FLD AUTO: 3.76 K/UL

## 2019-04-23 RX ORDER — PREGABALIN 225 MG/1
1000 CAPSULE ORAL ONCE
Qty: 0 | Refills: 0 | Status: COMPLETED | OUTPATIENT
Start: 2019-04-23 | End: 2019-04-23

## 2019-04-23 RX ORDER — ERYTHROPOIETIN 10000 [IU]/ML
60000 INJECTION, SOLUTION INTRAVENOUS; SUBCUTANEOUS ONCE
Qty: 0 | Refills: 0 | Status: COMPLETED | OUTPATIENT
Start: 2019-04-23 | End: 2019-04-23

## 2019-04-23 RX ORDER — DOXYCYCLINE HYCLATE 100 MG/1
100 CAPSULE ORAL
Qty: 20 | Refills: 0 | Status: DISCONTINUED | COMMUNITY
Start: 2019-03-04 | End: 2019-04-23

## 2019-04-23 RX ADMIN — PREGABALIN 1000 MICROGRAM(S): 225 CAPSULE ORAL at 12:33

## 2019-04-23 RX ADMIN — ERYTHROPOIETIN 60000 UNIT(S): 10000 INJECTION, SOLUTION INTRAVENOUS; SUBCUTANEOUS at 12:30

## 2019-04-27 NOTE — ASSESSMENT
[FreeTextEntry1] : Lowrisk myelodysplastic syndrome, on Revlimid and Procrit. Did not require blood transfusion for 2 years. \par and then needed transfusion again in 9/18 likely due to non-compliance with procrit.\par \par Pt was also diagnosed with B12 deficiency.\par \par PLAN:\par _referral to ID given. \par -May need I and D\par Continue procrit\par -Communicated in the event of fevers >100.8 she needs to go ER. \par \par \par \par #MDS:\par On  Revlimid 5 mg daily 2 weeks on 1 week off.  Encouraged her to take  Imodium and also Lomotil if diarrhea is not controlled. \par Continue B12 injections monthly. Due today. \par \par RTC in 2 weeks.\par

## 2019-04-27 NOTE — HISTORY OF PRESENT ILLNESS
[de-identified] : She missed on appointment for procrit last week.\par She starts her next cycle on 6/25/18\par C/o back pain radiating down her left which occurred when she bent to  something.\par No change in diarrhea.\par \par 7/31/18:\par Doing well. On Revlimid for more than 2yrs, 5mg 2weeks on 1 week off. She will be starting week on tonight. \par C/o diarrhea. On Imodium 2 pills AM and 2 pills PM. Doesn't help much with diarrhea. \par C/o nausea, abd pain. \par Colonoscopy in 2016 was normal. \par Did not have blood transfusion for over 2 years. \par On procrit [id number] weekly. Missed last week on 7/24/18 as she was out of town. She has been non compliant with weekly Procrit.\par Mammogram in 2017 was normal. \par \par 9/11/18\par pt is here for follow up.\par She feels the lomotil helps with the diarrhea.\par She was away for a few weeks and missed her procrit injections.\par No fever, abdominal  discomfort has been less since since use of lomotil.\par She started a new cycle 2 days ago.\par \par 10/2/18:\par She will start Revlimid tonight (week on). 2 weeks on, 1 week off. \par On ASA 81mg. \par Denies fever, nausea, vomiting, chest pain, SOB, abdominal pain, and bladder problems.\par C/o diarrhea. \par S/p 2 units PRBC transfusion on 9/25/18. \par On Procrit 48411 units weekly. Not compliant every week. \par C/o intermittent dizziness. \par \par 10/31/18\par Pt is here for follow up.\par C/O significant fatigue.\par Continues to have diarrhea, takes imodium and lomotil as needed.\par C/o dry cough, no fever.\par Cough started a month ago. It is worse in the mornings however over last week it has been constant all day.\par No chest pain.\par She was found to have low B12 levels and was started on B12 injections.\par \par 11/27/18:\par Doing well. Hospitalized for 3 days for cellulitis/abcess of the back s/p drainage and on Abx currently. Developed 3 days after Mg infusion as per pt. \par Denies nausea, vomiting, chest pain, SOB, abdominal pain, bowel and bladder problems.\par This is the week on Revlimid (week 1).\par S/p 1 unit PRBC transfusion in the hospital. \par On Procrit weekly \par \par 12/27/18:\par Doing well. No major complaints.\par Denies fever, nausea, vomiting, chest pain, SOB, abdominal pain, and bladder problems.\par On Revlimid 5 mg 2 weeks on 1 week off. This is the week off. She will start the week on sunday (12/30/18).\par Due for Procrit 01384 units today. \par Still has diarrhea. 4-5bm/day.\par On monthly B12 injections. \par \par 1/30/19:\par Patient here for follow up for MDS.  She is taking Revlimid 5 mg, 2 weeks on, 1 week off.  She will complete this current cycle on Saturday.  She has no new complaints today.  Patient denies cough, shortness of breath, denies fever, denies bone pain.\par \par 03/04/2019\par Patient is here for a follow up visit. She complaints of severe pain in her left shoulder and has had abscess drained 2 weeks ago. However the pain is worse and she has purulent discharge on examination. She also has Nasal sores that are painful. Culture from 11/2018 showed MRSA.  Denies Fever. \par She also complaints of swelling in her right leg. Not tender and not erythematous and negative Gong;s sign. \par Her diarrhea with Revlimid is ongoing and Imodium and Lomotil helps but not everyday. \par She is on 60,000 units of procrit weekly and Revlimid 5 mg 2 weeks on 1 week off. \par \par 4/23/19\par Pt is here for follow up.\par She feels well.\par The nasal sores have improved with bactroban\par The abscess on left shoulder has resolved.\par However she has a new one on the middle of her back.\par No fever.\par Pt has been on procrit 87005 units weekly, however she missed a dose in between. [de-identified] : \par

## 2019-04-29 ENCOUNTER — RX RENEWAL (OUTPATIENT)
Age: 74
End: 2019-04-29

## 2019-04-30 ENCOUNTER — RX RENEWAL (OUTPATIENT)
Age: 74
End: 2019-04-30

## 2019-04-30 ENCOUNTER — APPOINTMENT (OUTPATIENT)
Dept: INFUSION THERAPY | Facility: CLINIC | Age: 74
End: 2019-04-30

## 2019-04-30 ENCOUNTER — LABORATORY RESULT (OUTPATIENT)
Age: 74
End: 2019-04-30

## 2019-04-30 ENCOUNTER — OTHER (OUTPATIENT)
Age: 74
End: 2019-04-30

## 2019-04-30 LAB
HCT VFR BLD CALC: 22.6 %
HGB BLD-MCNC: 7.4 G/DL
MCHC RBC-ENTMCNC: 32.7 G/DL
MCHC RBC-ENTMCNC: 35.9 PG
MCV RBC AUTO: 109.7 FL
PLATELET # BLD AUTO: 229 K/UL
PMV BLD: 10.9 FL
RBC # BLD: 2.06 M/UL
RBC # FLD: 16 %
WBC # FLD AUTO: 3.16 K/UL

## 2019-04-30 RX ORDER — ERYTHROPOIETIN 10000 [IU]/ML
60000 INJECTION, SOLUTION INTRAVENOUS; SUBCUTANEOUS ONCE
Qty: 0 | Refills: 0 | Status: COMPLETED | OUTPATIENT
Start: 2019-04-30 | End: 2019-04-30

## 2019-04-30 RX ADMIN — ERYTHROPOIETIN 60000 UNIT(S): 10000 INJECTION, SOLUTION INTRAVENOUS; SUBCUTANEOUS at 16:33

## 2019-05-01 ENCOUNTER — APPOINTMENT (OUTPATIENT)
Dept: INFUSION THERAPY | Facility: CLINIC | Age: 74
End: 2019-05-01

## 2019-05-08 ENCOUNTER — APPOINTMENT (OUTPATIENT)
Dept: HEMATOLOGY ONCOLOGY | Facility: CLINIC | Age: 74
End: 2019-05-08

## 2019-05-08 ENCOUNTER — APPOINTMENT (OUTPATIENT)
Dept: INFUSION THERAPY | Facility: CLINIC | Age: 74
End: 2019-05-08

## 2019-05-08 ENCOUNTER — LABORATORY RESULT (OUTPATIENT)
Age: 74
End: 2019-05-08

## 2019-05-08 VITALS
HEIGHT: 62 IN | RESPIRATION RATE: 16 BRPM | BODY MASS INDEX: 26.5 KG/M2 | TEMPERATURE: 97.9 F | SYSTOLIC BLOOD PRESSURE: 121 MMHG | WEIGHT: 144 LBS | HEART RATE: 85 BPM | DIASTOLIC BLOOD PRESSURE: 55 MMHG

## 2019-05-08 LAB
HCT VFR BLD CALC: 23.8 %
HGB BLD-MCNC: 7.7 G/DL
MCHC RBC-ENTMCNC: 32.4 G/DL
MCHC RBC-ENTMCNC: 35.8 PG
MCV RBC AUTO: 110.7 FL
PLATELET # BLD AUTO: 316 K/UL
PMV BLD: 10.1 FL
RBC # BLD: 2.15 M/UL
RBC # FLD: 16.2 %
WBC # FLD AUTO: 2.38 K/UL

## 2019-05-08 RX ORDER — ERYTHROPOIETIN 10000 [IU]/ML
80000 INJECTION, SOLUTION INTRAVENOUS; SUBCUTANEOUS ONCE
Qty: 0 | Refills: 0 | Status: COMPLETED | OUTPATIENT
Start: 2019-05-08 | End: 2019-05-08

## 2019-05-08 RX ADMIN — ERYTHROPOIETIN 80000 UNIT(S): 10000 INJECTION, SOLUTION INTRAVENOUS; SUBCUTANEOUS at 14:21

## 2019-05-09 LAB
ALBUMIN SERPL ELPH-MCNC: 4.5 G/DL
ALP BLD-CCNC: 96 U/L
ALT SERPL-CCNC: 13 U/L
ANION GAP SERPL CALC-SCNC: 16 MMOL/L
AST SERPL-CCNC: 11 U/L
BILIRUB SERPL-MCNC: 0.4 MG/DL
BUN SERPL-MCNC: 23 MG/DL
CALCIUM SERPL-MCNC: 9.2 MG/DL
CHLORIDE SERPL-SCNC: 103 MMOL/L
CO2 SERPL-SCNC: 18 MMOL/L
CREAT SERPL-MCNC: 1.3 MG/DL
FERRITIN SERPL-MCNC: 625 NG/ML
FOLATE SERPL-MCNC: 13.7 NG/ML
GLUCOSE SERPL-MCNC: 215 MG/DL
IRON SATN MFR SERPL: 36 %
IRON SERPL-MCNC: 97 UG/DL
LDH SERPL-CCNC: 145
POTASSIUM SERPL-SCNC: 4.4 MMOL/L
PROT SERPL-MCNC: 6.7 G/DL
RETICS # AUTO: 2.9 %
RETICS AGGREG/RBC NFR: 64.7 K/UL
SODIUM SERPL-SCNC: 137 MMOL/L
TIBC SERPL-MCNC: 272 UG/DL
UIBC SERPL-MCNC: 175 UG/DL
VIT B12 SERPL-MCNC: 484 PG/ML

## 2019-05-11 NOTE — ASSESSMENT
[FreeTextEntry1] : Lowrisk myelodysplastic syndrome, on Revlimid and Procrit. \par Pt was also diagnosed with B12 deficiency.\par \par PLAN:\par increase procrit 86319 units weekly.\par labs ordered\par \par On  Revlimid 5 mg daily 2 weeks on 1 week off.  Encouraged her to take  Imodium and also Lomotil if diarrhea is not controlled. \par Continue B12 injections monthly. \par RTC in 3 weeks.\par

## 2019-05-11 NOTE — HISTORY OF PRESENT ILLNESS
[de-identified] : She missed on appointment for procrit last week.\par She starts her next cycle on 6/25/18\par C/o back pain radiating down her left which occurred when she bent to  something.\par No change in diarrhea.\par \par 7/31/18:\par Doing well. On Revlimid for more than 2yrs, 5mg 2weeks on 1 week off. She will be starting week on tonight. \par C/o diarrhea. On Imodium 2 pills AM and 2 pills PM. Doesn't help much with diarrhea. \par C/o nausea, abd pain. \par Colonoscopy in 2016 was normal. \par Did not have blood transfusion for over 2 years. \par On procrit [id number] weekly. Missed last week on 7/24/18 as she was out of town. She has been non compliant with weekly Procrit.\par Mammogram in 2017 was normal. \par \par 9/11/18\par pt is here for follow up.\par She feels the lomotil helps with the diarrhea.\par She was away for a few weeks and missed her procrit injections.\par No fever, abdominal  discomfort has been less since since use of lomotil.\par She started a new cycle 2 days ago.\par \par 10/2/18:\par She will start Revlimid tonight (week on). 2 weeks on, 1 week off. \par On ASA 81mg. \par Denies fever, nausea, vomiting, chest pain, SOB, abdominal pain, and bladder problems.\par C/o diarrhea. \par S/p 2 units PRBC transfusion on 9/25/18. \par On Procrit 42384 units weekly. Not compliant every week. \par C/o intermittent dizziness. \par \par 10/31/18\par Pt is here for follow up.\par C/O significant fatigue.\par Continues to have diarrhea, takes imodium and lomotil as needed.\par C/o dry cough, no fever.\par Cough started a month ago. It is worse in the mornings however over last week it has been constant all day.\par No chest pain.\par She was found to have low B12 levels and was started on B12 injections.\par \par 11/27/18:\par Doing well. Hospitalized for 3 days for cellulitis/abcess of the back s/p drainage and on Abx currently. Developed 3 days after Mg infusion as per pt. \par Denies nausea, vomiting, chest pain, SOB, abdominal pain, bowel and bladder problems.\par This is the week on Revlimid (week 1).\par S/p 1 unit PRBC transfusion in the hospital. \par On Procrit weekly \par \par 12/27/18:\par Doing well. No major complaints.\par Denies fever, nausea, vomiting, chest pain, SOB, abdominal pain, and bladder problems.\par On Revlimid 5 mg 2 weeks on 1 week off. This is the week off. She will start the week on sunday (12/30/18).\par Due for Procrit 74259 units today. \par Still has diarrhea. 4-5bm/day.\par On monthly B12 injections. \par \par 1/30/19:\par Patient here for follow up for MDS.  She is taking Revlimid 5 mg, 2 weeks on, 1 week off.  She will complete this current cycle on Saturday.  She has no new complaints today.  Patient denies cough, shortness of breath, denies fever, denies bone pain.\par \par 03/04/2019\par Patient is here for a follow up visit. She complaints of severe pain in her left shoulder and has had abscess drained 2 weeks ago. However the pain is worse and she has purulent discharge on examination. She also has Nasal sores that are painful. Culture from 11/2018 showed MRSA.  Denies Fever. \par She also complaints of swelling in her right leg. Not tender and not erythematous and negative Gong;s sign. \par Her diarrhea with Revlimid is ongoing and Imodium and Lomotil helps but not everyday. \par She is on 60,000 units of procrit weekly and Revlimid 5 mg 2 weeks on 1 week off. \par \par 4/23/19\par Pt is here for follow up.\par She feels well.\par The nasal sores have improved with bactroban\par The abscess on left shoulder has resolved.\par However she has a new one on the middle of her back.\par No fever.\par Pt has been on procrit 03055 units weekly, however she missed a dose in between.\par \par 5/8/19\par pt is here for follow up.\par no new complaints.\par feels well.\par Her skin abscess has resolved.\par she has been unable to go to ID, as she could not get an appt.\par No bleeding.\par She is compliant with revlimid. [de-identified] : \par

## 2019-05-11 NOTE — PHYSICAL EXAM
[Fully active, able to carry on all pre-disease performance without restriction] : Status 0 - Fully active, able to carry on all pre-disease performance without restriction [Normal] : grossly intact [de-identified] : Abscess on left scapular region has improved, new abscess interscapular area. [de-identified] : pharynx clear, no exudates [de-identified] : abcess on the back has resolved

## 2019-05-11 NOTE — REVIEW OF SYSTEMS
[Shortness Of Breath] : shortness of breath [Cough] : cough [Diarrhea] : diarrhea [Negative] : Allergic/Immunologic [FreeTextEntry6] : chronic, intermittent SOB Declines

## 2019-05-15 ENCOUNTER — APPOINTMENT (OUTPATIENT)
Dept: INFUSION THERAPY | Facility: CLINIC | Age: 74
End: 2019-05-15

## 2019-05-15 ENCOUNTER — LABORATORY RESULT (OUTPATIENT)
Age: 74
End: 2019-05-15

## 2019-05-15 RX ORDER — ERYTHROPOIETIN 10000 [IU]/ML
80000 INJECTION, SOLUTION INTRAVENOUS; SUBCUTANEOUS ONCE
Refills: 0 | Status: COMPLETED | OUTPATIENT
Start: 2019-05-15 | End: 2019-05-15

## 2019-05-15 RX ADMIN — ERYTHROPOIETIN 80000 UNIT(S): 10000 INJECTION, SOLUTION INTRAVENOUS; SUBCUTANEOUS at 17:15

## 2019-05-17 LAB
HCT VFR BLD CALC: 23 %
HGB BLD-MCNC: 7.4 G/DL
MCHC RBC-ENTMCNC: 32.2 G/DL
MCHC RBC-ENTMCNC: 36.1 PG
MCV RBC AUTO: 112.2 FL
PLATELET # BLD AUTO: 335 K/UL
PMV BLD: 11.2 FL
RBC # BLD: 2.05 M/UL
RBC # FLD: 15.8 %
WBC # FLD AUTO: 3.65 K/UL

## 2019-05-22 ENCOUNTER — APPOINTMENT (OUTPATIENT)
Dept: INFUSION THERAPY | Facility: CLINIC | Age: 74
End: 2019-05-22

## 2019-05-22 ENCOUNTER — LABORATORY RESULT (OUTPATIENT)
Age: 74
End: 2019-05-22

## 2019-05-22 VITALS
RESPIRATION RATE: 18 BRPM | HEART RATE: 86 BPM | DIASTOLIC BLOOD PRESSURE: 60 MMHG | SYSTOLIC BLOOD PRESSURE: 121 MMHG | TEMPERATURE: 97.7 F

## 2019-05-22 RX ORDER — PREGABALIN 225 MG/1
1000 CAPSULE ORAL ONCE
Refills: 0 | Status: COMPLETED | OUTPATIENT
Start: 2019-05-22 | End: 2019-05-22

## 2019-05-22 RX ORDER — ERYTHROPOIETIN 10000 [IU]/ML
80000 INJECTION, SOLUTION INTRAVENOUS; SUBCUTANEOUS ONCE
Refills: 0 | Status: COMPLETED | OUTPATIENT
Start: 2019-05-22 | End: 2019-05-22

## 2019-05-22 RX ADMIN — PREGABALIN 1000 MICROGRAM(S): 225 CAPSULE ORAL at 17:16

## 2019-05-22 RX ADMIN — ERYTHROPOIETIN 80000 UNIT(S): 10000 INJECTION, SOLUTION INTRAVENOUS; SUBCUTANEOUS at 17:17

## 2019-05-23 LAB
HCT VFR BLD CALC: 24.2 %
HGB BLD-MCNC: 7.9 G/DL
MCHC RBC-ENTMCNC: 32.6 G/DL
MCHC RBC-ENTMCNC: 36.9 PG
MCV RBC AUTO: 113.1 FL
PLATELET # BLD AUTO: 302 K/UL
PMV BLD: 10.8 FL
RBC # BLD: 2.14 M/UL
RBC # FLD: 16.5 %
WBC # FLD AUTO: 4.54 K/UL

## 2019-05-31 ENCOUNTER — APPOINTMENT (OUTPATIENT)
Dept: INFUSION THERAPY | Facility: CLINIC | Age: 74
End: 2019-05-31

## 2019-05-31 ENCOUNTER — LABORATORY RESULT (OUTPATIENT)
Age: 74
End: 2019-05-31

## 2019-05-31 VITALS
RESPIRATION RATE: 18 BRPM | HEART RATE: 83 BPM | DIASTOLIC BLOOD PRESSURE: 61 MMHG | SYSTOLIC BLOOD PRESSURE: 112 MMHG | TEMPERATURE: 96.7 F

## 2019-05-31 RX ORDER — ERYTHROPOIETIN 10000 [IU]/ML
80000 INJECTION, SOLUTION INTRAVENOUS; SUBCUTANEOUS ONCE
Refills: 0 | Status: COMPLETED | OUTPATIENT
Start: 2019-05-31 | End: 2019-05-31

## 2019-05-31 RX ADMIN — ERYTHROPOIETIN 80000 UNIT(S): 10000 INJECTION, SOLUTION INTRAVENOUS; SUBCUTANEOUS at 16:30

## 2019-06-03 LAB
HCT VFR BLD CALC: 26.6 %
HGB BLD-MCNC: 8.7 G/DL
MCHC RBC-ENTMCNC: 32.7 G/DL
MCHC RBC-ENTMCNC: 36.9 PG
MCV RBC AUTO: 112.7 FL
PLATELET # BLD AUTO: 296 K/UL
PMV BLD: 10.3 FL
RBC # BLD: 2.36 M/UL
RBC # FLD: 16.1 %
WBC # FLD AUTO: 3.51 K/UL

## 2019-06-06 ENCOUNTER — LABORATORY RESULT (OUTPATIENT)
Age: 74
End: 2019-06-06

## 2019-06-06 ENCOUNTER — APPOINTMENT (OUTPATIENT)
Dept: INFUSION THERAPY | Facility: CLINIC | Age: 74
End: 2019-06-06

## 2019-06-06 ENCOUNTER — APPOINTMENT (OUTPATIENT)
Dept: HEMATOLOGY ONCOLOGY | Facility: CLINIC | Age: 74
End: 2019-06-06
Payer: MEDICARE

## 2019-06-06 VITALS
TEMPERATURE: 98.6 F | BODY MASS INDEX: 25.76 KG/M2 | RESPIRATION RATE: 18 BRPM | WEIGHT: 140 LBS | HEIGHT: 62 IN | SYSTOLIC BLOOD PRESSURE: 107 MMHG | DIASTOLIC BLOOD PRESSURE: 52 MMHG | HEART RATE: 89 BPM

## 2019-06-06 LAB
HCT VFR BLD CALC: 25.7 %
HGB BLD-MCNC: 8.3 G/DL
MCHC RBC-ENTMCNC: 32.3 G/DL
MCHC RBC-ENTMCNC: 36.6 PG
MCV RBC AUTO: 113.2 FL
PLATELET # BLD AUTO: 361 K/UL
PMV BLD: 10.3 FL
RBC # BLD: 2.27 M/UL
RBC # FLD: 15.4 %
WBC # FLD AUTO: 3.61 K/UL

## 2019-06-06 PROCEDURE — 99214 OFFICE O/P EST MOD 30 MIN: CPT

## 2019-06-06 RX ORDER — ERYTHROPOIETIN 10000 [IU]/ML
80000 INJECTION, SOLUTION INTRAVENOUS; SUBCUTANEOUS ONCE
Refills: 0 | Status: COMPLETED | OUTPATIENT
Start: 2019-06-06 | End: 2019-06-06

## 2019-06-06 RX ADMIN — ERYTHROPOIETIN 80000 UNIT(S): 10000 INJECTION, SOLUTION INTRAVENOUS; SUBCUTANEOUS at 16:11

## 2019-06-07 NOTE — ASSESSMENT
[FreeTextEntry1] : Lowrisk myelodysplastic syndrome, on Revlimid and Procrit. \par Pt was also diagnosed with B12 deficiency.\par \par PLAN:\par continue procrit 69201 units weekly.\par On  Revlimid 5 mg daily 2 weeks on 1 week off.  Encouraged her to take  Imodium and also Lomotil if diarrhea is not controlled. \par Continue B12 injections monthly. \par RTC in 3 weeks.\par

## 2019-06-07 NOTE — HISTORY OF PRESENT ILLNESS
[de-identified] : She missed on appointment for procrit last week.\par She starts her next cycle on 6/25/18\par C/o back pain radiating down her left which occurred when she bent to  something.\par No change in diarrhea.\par \par 7/31/18:\par Doing well. On Revlimid for more than 2yrs, 5mg 2weeks on 1 week off. She will be starting week on tonight. \par C/o diarrhea. On Imodium 2 pills AM and 2 pills PM. Doesn't help much with diarrhea. \par C/o nausea, abd pain. \par Colonoscopy in 2016 was normal. \par Did not have blood transfusion for over 2 years. \par On procrit [id number] weekly. Missed last week on 7/24/18 as she was out of town. She has been non compliant with weekly Procrit.\par Mammogram in 2017 was normal. \par \par 9/11/18\par pt is here for follow up.\par She feels the lomotil helps with the diarrhea.\par She was away for a few weeks and missed her procrit injections.\par No fever, abdominal  discomfort has been less since since use of lomotil.\par She started a new cycle 2 days ago.\par \par 10/2/18:\par She will start Revlimid tonight (week on). 2 weeks on, 1 week off. \par On ASA 81mg. \par Denies fever, nausea, vomiting, chest pain, SOB, abdominal pain, and bladder problems.\par C/o diarrhea. \par S/p 2 units PRBC transfusion on 9/25/18. \par On Procrit 19609 units weekly. Not compliant every week. \par C/o intermittent dizziness. \par \par 10/31/18\par Pt is here for follow up.\par C/O significant fatigue.\par Continues to have diarrhea, takes imodium and lomotil as needed.\par C/o dry cough, no fever.\par Cough started a month ago. It is worse in the mornings however over last week it has been constant all day.\par No chest pain.\par She was found to have low B12 levels and was started on B12 injections.\par \par 11/27/18:\par Doing well. Hospitalized for 3 days for cellulitis/abcess of the back s/p drainage and on Abx currently. Developed 3 days after Mg infusion as per pt. \par Denies nausea, vomiting, chest pain, SOB, abdominal pain, bowel and bladder problems.\par This is the week on Revlimid (week 1).\par S/p 1 unit PRBC transfusion in the hospital. \par On Procrit weekly \par \par 12/27/18:\par Doing well. No major complaints.\par Denies fever, nausea, vomiting, chest pain, SOB, abdominal pain, and bladder problems.\par On Revlimid 5 mg 2 weeks on 1 week off. This is the week off. She will start the week on sunday (12/30/18).\par Due for Procrit 02488 units today. \par Still has diarrhea. 4-5bm/day.\par On monthly B12 injections. \par \par 1/30/19:\par Patient here for follow up for MDS.  She is taking Revlimid 5 mg, 2 weeks on, 1 week off.  She will complete this current cycle on Saturday.  She has no new complaints today.  Patient denies cough, shortness of breath, denies fever, denies bone pain.\par \par 03/04/2019\par Patient is here for a follow up visit. She complaints of severe pain in her left shoulder and has had abscess drained 2 weeks ago. However the pain is worse and she has purulent discharge on examination. She also has Nasal sores that are painful. Culture from 11/2018 showed MRSA.  Denies Fever. \par She also complaints of swelling in her right leg. Not tender and not erythematous and negative Gong;s sign. \par Her diarrhea with Revlimid is ongoing and Imodium and Lomotil helps but not everyday. \par She is on 60,000 units of procrit weekly and Revlimid 5 mg 2 weeks on 1 week off. \par \par 4/23/19\par Pt is here for follow up.\par She feels well.\par The nasal sores have improved with bactroban\par The abscess on left shoulder has resolved.\par However she has a new one on the middle of her back.\par No fever.\par Pt has been on procrit 85426 units weekly, however she missed a dose in between.\par \par 5/8/19\par pt is here for follow up.\par no new complaints.\par feels well.\par Her skin abscess has resolved.\par she has been unable to go to ID, as she could not get an appt.\par No bleeding.\par She is compliant with revlimid.\par \par 6/6/19\par Pt is here for follow up.\par no new complaints \par Feels well.\par Is on week 2 of her Revlimid cycle. \par No transfusions since last visit [de-identified] : \par

## 2019-06-07 NOTE — PHYSICAL EXAM
[Fully active, able to carry on all pre-disease performance without restriction] : Status 0 - Fully active, able to carry on all pre-disease performance without restriction [Normal] : full range of motion and no deformities appreciated [de-identified] : pharynx clear, no exudates [de-identified] : Abscess on left scapular region has improved, new abscess interscapular area. [de-identified] : abcess on the back has resolved

## 2019-06-11 ENCOUNTER — RX RENEWAL (OUTPATIENT)
Age: 74
End: 2019-06-11

## 2019-06-12 ENCOUNTER — APPOINTMENT (OUTPATIENT)
Dept: INFUSION THERAPY | Facility: CLINIC | Age: 74
End: 2019-06-12

## 2019-06-12 ENCOUNTER — RX RENEWAL (OUTPATIENT)
Age: 74
End: 2019-06-12

## 2019-06-21 ENCOUNTER — LABORATORY RESULT (OUTPATIENT)
Age: 74
End: 2019-06-21

## 2019-06-21 ENCOUNTER — APPOINTMENT (OUTPATIENT)
Dept: INFUSION THERAPY | Facility: CLINIC | Age: 74
End: 2019-06-21

## 2019-06-21 RX ORDER — ERYTHROPOIETIN 10000 [IU]/ML
80000 INJECTION, SOLUTION INTRAVENOUS; SUBCUTANEOUS ONCE
Refills: 0 | Status: COMPLETED | OUTPATIENT
Start: 2019-06-21 | End: 2019-06-21

## 2019-06-21 RX ORDER — PREGABALIN 225 MG/1
1000 CAPSULE ORAL ONCE
Refills: 0 | Status: COMPLETED | OUTPATIENT
Start: 2019-06-21 | End: 2019-06-21

## 2019-06-21 RX ADMIN — ERYTHROPOIETIN 80000 UNIT(S): 10000 INJECTION, SOLUTION INTRAVENOUS; SUBCUTANEOUS at 16:51

## 2019-06-21 RX ADMIN — PREGABALIN 1000 MICROGRAM(S): 225 CAPSULE ORAL at 16:51

## 2019-06-28 ENCOUNTER — LABORATORY RESULT (OUTPATIENT)
Age: 74
End: 2019-06-28

## 2019-06-28 ENCOUNTER — OUTPATIENT (OUTPATIENT)
Dept: OUTPATIENT SERVICES | Facility: HOSPITAL | Age: 74
LOS: 1 days | Discharge: HOME | End: 2019-06-28

## 2019-06-28 ENCOUNTER — APPOINTMENT (OUTPATIENT)
Dept: INFUSION THERAPY | Facility: CLINIC | Age: 74
End: 2019-06-28

## 2019-06-28 DIAGNOSIS — Z79.899 OTHER LONG TERM (CURRENT) DRUG THERAPY: ICD-10-CM

## 2019-06-28 DIAGNOSIS — L02.91 CUTANEOUS ABSCESS, UNSPECIFIED: ICD-10-CM

## 2019-06-28 DIAGNOSIS — D46.9 MYELODYSPLASTIC SYNDROME, UNSPECIFIED: ICD-10-CM

## 2019-06-28 LAB
HCT VFR BLD CALC: 23.9 %
HCT VFR BLD CALC: 25.2 %
HGB BLD-MCNC: 8 G/DL
HGB BLD-MCNC: 8.2 G/DL
MCHC RBC-ENTMCNC: 32.5 G/DL
MCHC RBC-ENTMCNC: 33.5 G/DL
MCHC RBC-ENTMCNC: 36.3 PG
MCHC RBC-ENTMCNC: 37.4 PG
MCV RBC AUTO: 111.5 FL
MCV RBC AUTO: 111.7 FL
PLATELET # BLD AUTO: 177 K/UL
PLATELET # BLD AUTO: 224 K/UL
PMV BLD: 10.2 FL
PMV BLD: 11.2 FL
RBC # BLD: 2.14 M/UL
RBC # BLD: 2.26 M/UL
RBC # FLD: 14 %
RBC # FLD: 14.1 %
WBC # FLD AUTO: 2.66 K/UL
WBC # FLD AUTO: 3.35 K/UL

## 2019-06-28 RX ORDER — ERYTHROPOIETIN 10000 [IU]/ML
80000 INJECTION, SOLUTION INTRAVENOUS; SUBCUTANEOUS ONCE
Refills: 0 | Status: COMPLETED | OUTPATIENT
Start: 2019-06-28 | End: 2019-06-28

## 2019-06-28 RX ADMIN — ERYTHROPOIETIN 80000 UNIT(S): 10000 INJECTION, SOLUTION INTRAVENOUS; SUBCUTANEOUS at 16:33

## 2019-07-03 ENCOUNTER — APPOINTMENT (OUTPATIENT)
Dept: INFUSION THERAPY | Facility: CLINIC | Age: 74
End: 2019-07-03

## 2019-07-17 ENCOUNTER — APPOINTMENT (OUTPATIENT)
Dept: HEMATOLOGY ONCOLOGY | Facility: CLINIC | Age: 74
End: 2019-07-17
Payer: MEDICARE

## 2019-07-17 ENCOUNTER — APPOINTMENT (OUTPATIENT)
Dept: INFUSION THERAPY | Facility: CLINIC | Age: 74
End: 2019-07-17

## 2019-07-17 ENCOUNTER — LABORATORY RESULT (OUTPATIENT)
Age: 74
End: 2019-07-17

## 2019-07-17 VITALS
TEMPERATURE: 99 F | HEIGHT: 62 IN | RESPIRATION RATE: 18 BRPM | DIASTOLIC BLOOD PRESSURE: 59 MMHG | WEIGHT: 145 LBS | BODY MASS INDEX: 26.68 KG/M2 | HEART RATE: 88 BPM | SYSTOLIC BLOOD PRESSURE: 133 MMHG

## 2019-07-17 LAB
HCT VFR BLD CALC: 23.2 %
HGB BLD-MCNC: 7.7 G/DL
MCHC RBC-ENTMCNC: 33.2 G/DL
MCHC RBC-ENTMCNC: 36.8 PG
MCV RBC AUTO: 111 FL
PLATELET # BLD AUTO: 365 K/UL
PMV BLD: 10.1 FL
RBC # BLD: 2.09 M/UL
RBC # FLD: 13.7 %
WBC # FLD AUTO: 4.5 K/UL

## 2019-07-17 PROCEDURE — 99214 OFFICE O/P EST MOD 30 MIN: CPT

## 2019-07-17 RX ORDER — PREGABALIN 225 MG/1
1000 CAPSULE ORAL ONCE
Refills: 0 | Status: COMPLETED | OUTPATIENT
Start: 2019-07-17 | End: 2019-07-17

## 2019-07-17 RX ORDER — ERYTHROPOIETIN 10000 [IU]/ML
80000 INJECTION, SOLUTION INTRAVENOUS; SUBCUTANEOUS ONCE
Refills: 0 | Status: COMPLETED | OUTPATIENT
Start: 2019-07-17 | End: 2019-07-17

## 2019-07-17 RX ADMIN — PREGABALIN 1000 MICROGRAM(S): 225 CAPSULE ORAL at 13:29

## 2019-07-17 RX ADMIN — ERYTHROPOIETIN 80000 UNIT(S): 10000 INJECTION, SOLUTION INTRAVENOUS; SUBCUTANEOUS at 13:28

## 2019-07-22 NOTE — PHYSICAL EXAM
[Fully active, able to carry on all pre-disease performance without restriction] : Status 0 - Fully active, able to carry on all pre-disease performance without restriction [Normal] : grossly intact [de-identified] : pharynx clear, no exudates [de-identified] : Abscess on left scapular region has improved, new abscess interscapular area. [de-identified] : abcess on the back has resolved

## 2019-07-22 NOTE — HISTORY OF PRESENT ILLNESS
[de-identified] : She missed on appointment for procrit last week.\par She starts her next cycle on 6/25/18\par C/o back pain radiating down her left which occurred when she bent to  something.\par No change in diarrhea.\par \par 7/31/18:\par Doing well. On Revlimid for more than 2yrs, 5mg 2weeks on 1 week off. She will be starting week on tonight. \par C/o diarrhea. On Imodium 2 pills AM and 2 pills PM. Doesn't help much with diarrhea. \par C/o nausea, abd pain. \par Colonoscopy in 2016 was normal. \par Did not have blood transfusion for over 2 years. \par On procrit [id number] weekly. Missed last week on 7/24/18 as she was out of town. She has been non compliant with weekly Procrit.\par Mammogram in 2017 was normal. \par \par 9/11/18\par pt is here for follow up.\par She feels the lomotil helps with the diarrhea.\par She was away for a few weeks and missed her procrit injections.\par No fever, abdominal  discomfort has been less since since use of lomotil.\par She started a new cycle 2 days ago.\par \par 10/2/18:\par She will start Revlimid tonight (week on). 2 weeks on, 1 week off. \par On ASA 81mg. \par Denies fever, nausea, vomiting, chest pain, SOB, abdominal pain, and bladder problems.\par C/o diarrhea. \par S/p 2 units PRBC transfusion on 9/25/18. \par On Procrit 05432 units weekly. Not compliant every week. \par C/o intermittent dizziness. \par \par 10/31/18\par Pt is here for follow up.\par C/O significant fatigue.\par Continues to have diarrhea, takes imodium and lomotil as needed.\par C/o dry cough, no fever.\par Cough started a month ago. It is worse in the mornings however over last week it has been constant all day.\par No chest pain.\par She was found to have low B12 levels and was started on B12 injections.\par \par 11/27/18:\par Doing well. Hospitalized for 3 days for cellulitis/abcess of the back s/p drainage and on Abx currently. Developed 3 days after Mg infusion as per pt. \par Denies nausea, vomiting, chest pain, SOB, abdominal pain, bowel and bladder problems.\par This is the week on Revlimid (week 1).\par S/p 1 unit PRBC transfusion in the hospital. \par On Procrit weekly \par \par 12/27/18:\par Doing well. No major complaints.\par Denies fever, nausea, vomiting, chest pain, SOB, abdominal pain, and bladder problems.\par On Revlimid 5 mg 2 weeks on 1 week off. This is the week off. She will start the week on sunday (12/30/18).\par Due for Procrit 47038 units today. \par Still has diarrhea. 4-5bm/day.\par On monthly B12 injections. \par \par 1/30/19:\par Patient here for follow up for MDS.  She is taking Revlimid 5 mg, 2 weeks on, 1 week off.  She will complete this current cycle on Saturday.  She has no new complaints today.  Patient denies cough, shortness of breath, denies fever, denies bone pain.\par \par 03/04/2019\par Patient is here for a follow up visit. She complaints of severe pain in her left shoulder and has had abscess drained 2 weeks ago. However the pain is worse and she has purulent discharge on examination. She also has Nasal sores that are painful. Culture from 11/2018 showed MRSA.  Denies Fever. \par She also complaints of swelling in her right leg. Not tender and not erythematous and negative Gong;s sign. \par Her diarrhea with Revlimid is ongoing and Imodium and Lomotil helps but not everyday. \par She is on 60,000 units of procrit weekly and Revlimid 5 mg 2 weeks on 1 week off. \par \par 4/23/19\par Pt is here for follow up.\par She feels well.\par The nasal sores have improved with bactroban\par The abscess on left shoulder has resolved.\par However she has a new one on the middle of her back.\par No fever.\par Pt has been on procrit 11024 units weekly, however she missed a dose in between.\par \par 5/8/19\par pt is here for follow up.\par no new complaints.\par feels well.\par Her skin abscess has resolved.\par she has been unable to go to ID, as she could not get an appt.\par No bleeding.\par She is compliant with revlimid.\par \par 6/6/19\par Pt is here for follow up.\par no new complaints \par Feels well.\par Is on week 2 of her Revlimid cycle. \par No transfusions since last visit\par \par 7/17/19\par Pt is here for follow up.\par C/O fatigue.\par Was away for a few days two weeks ago, but missed treatment with procrit for 2 weeks.\par Is complaint with Revlimid 2 weeks on 1 week off.\par Diarrhea is a little improved. [de-identified] : \par

## 2019-07-22 NOTE — ASSESSMENT
[FreeTextEntry1] : Lowrisk myelodysplastic syndrome, on Revlimid and Procrit. \par Pt was also diagnosed with B12 deficiency.\par \par PLAN:\par continue procrit 48836 units weekly.\par Pt advised to remain compliant with procrit\par On  Revlimid 5 mg daily 2 weeks on 1 week off.  Encouraged her to take  Imodium and also Lomotil if diarrhea is not controlled. \par Continue B12 injections monthly. \par RTC in 3 weeks.\par

## 2019-07-24 ENCOUNTER — RX RENEWAL (OUTPATIENT)
Age: 74
End: 2019-07-24

## 2019-07-26 ENCOUNTER — APPOINTMENT (OUTPATIENT)
Dept: INFUSION THERAPY | Facility: CLINIC | Age: 74
End: 2019-07-26

## 2019-07-26 ENCOUNTER — LABORATORY RESULT (OUTPATIENT)
Age: 74
End: 2019-07-26

## 2019-07-26 RX ORDER — ERYTHROPOIETIN 10000 [IU]/ML
80000 INJECTION, SOLUTION INTRAVENOUS; SUBCUTANEOUS ONCE
Refills: 0 | Status: COMPLETED | OUTPATIENT
Start: 2019-07-26 | End: 2019-07-26

## 2019-07-26 RX ADMIN — ERYTHROPOIETIN 80000 UNIT(S): 10000 INJECTION, SOLUTION INTRAVENOUS; SUBCUTANEOUS at 16:15

## 2019-07-27 LAB
ALBUMIN SERPL ELPH-MCNC: 4.2 G/DL
ALP BLD-CCNC: 96 U/L
ALT SERPL-CCNC: 12 U/L
ANION GAP SERPL CALC-SCNC: 16 MMOL/L
AST SERPL-CCNC: 10 U/L
BILIRUB SERPL-MCNC: 0.3 MG/DL
BUN SERPL-MCNC: 22 MG/DL
CALCIUM SERPL-MCNC: 9.2 MG/DL
CHLORIDE SERPL-SCNC: 106 MMOL/L
CO2 SERPL-SCNC: 18 MMOL/L
CREAT SERPL-MCNC: 1.3 MG/DL
GLUCOSE SERPL-MCNC: 190 MG/DL
HCT VFR BLD CALC: 24.3 %
HGB BLD-MCNC: 7.8 G/DL
LDH SERPL-CCNC: 134
MCHC RBC-ENTMCNC: 32.1 G/DL
MCHC RBC-ENTMCNC: 35.6 PG
MCV RBC AUTO: 111 FL
PLATELET # BLD AUTO: 269 K/UL
PMV BLD: 9.7 FL
POTASSIUM SERPL-SCNC: 4.2 MMOL/L
PROT SERPL-MCNC: 6.7 G/DL
RBC # BLD: 2.19 M/UL
RBC # FLD: 14 %
SODIUM SERPL-SCNC: 140 MMOL/L
WBC # FLD AUTO: 3.21 K/UL

## 2019-08-02 ENCOUNTER — LABORATORY RESULT (OUTPATIENT)
Age: 74
End: 2019-08-02

## 2019-08-02 ENCOUNTER — APPOINTMENT (OUTPATIENT)
Dept: INFUSION THERAPY | Facility: CLINIC | Age: 74
End: 2019-08-02

## 2019-08-02 RX ORDER — ERYTHROPOIETIN 10000 [IU]/ML
80000 INJECTION, SOLUTION INTRAVENOUS; SUBCUTANEOUS ONCE
Refills: 0 | Status: COMPLETED | OUTPATIENT
Start: 2019-08-02 | End: 2019-08-02

## 2019-08-02 RX ADMIN — ERYTHROPOIETIN 80000 UNIT(S): 10000 INJECTION, SOLUTION INTRAVENOUS; SUBCUTANEOUS at 16:01

## 2019-08-09 ENCOUNTER — LABORATORY RESULT (OUTPATIENT)
Age: 74
End: 2019-08-09

## 2019-08-09 ENCOUNTER — OTHER (OUTPATIENT)
Age: 74
End: 2019-08-09

## 2019-08-09 ENCOUNTER — APPOINTMENT (OUTPATIENT)
Dept: INFUSION THERAPY | Facility: CLINIC | Age: 74
End: 2019-08-09

## 2019-08-09 RX ORDER — ERYTHROPOIETIN 10000 [IU]/ML
80000 INJECTION, SOLUTION INTRAVENOUS; SUBCUTANEOUS ONCE
Refills: 0 | Status: COMPLETED | OUTPATIENT
Start: 2019-08-09 | End: 2019-08-09

## 2019-08-09 RX ADMIN — ERYTHROPOIETIN 80000 UNIT(S): 10000 INJECTION, SOLUTION INTRAVENOUS; SUBCUTANEOUS at 16:26

## 2019-08-12 LAB
HCT VFR BLD CALC: 23.2 %
HCT VFR BLD CALC: 25.5 %
HGB BLD-MCNC: 7.6 G/DL
HGB BLD-MCNC: 8.4 G/DL
MCHC RBC-ENTMCNC: 32.8 G/DL
MCHC RBC-ENTMCNC: 32.9 G/DL
MCHC RBC-ENTMCNC: 36.2 PG
MCHC RBC-ENTMCNC: 36.2 PG
MCV RBC AUTO: 109.9 FL
MCV RBC AUTO: 110.5 FL
PLATELET # BLD AUTO: 305 K/UL
PLATELET # BLD AUTO: 376 K/UL
PMV BLD: 10.5 FL
PMV BLD: 10.7 FL
RBC # BLD: 2.1 M/UL
RBC # BLD: 2.32 M/UL
RBC # FLD: 14.1 %
RBC # FLD: 14.3 %
WBC # FLD AUTO: 3.33 K/UL
WBC # FLD AUTO: 4.14 K/UL

## 2019-08-14 ENCOUNTER — APPOINTMENT (OUTPATIENT)
Dept: HEMATOLOGY ONCOLOGY | Facility: CLINIC | Age: 74
End: 2019-08-14
Payer: MEDICARE

## 2019-08-14 ENCOUNTER — RX RENEWAL (OUTPATIENT)
Age: 74
End: 2019-08-14

## 2019-08-14 ENCOUNTER — LABORATORY RESULT (OUTPATIENT)
Age: 74
End: 2019-08-14

## 2019-08-14 ENCOUNTER — APPOINTMENT (OUTPATIENT)
Dept: INFUSION THERAPY | Facility: CLINIC | Age: 74
End: 2019-08-14

## 2019-08-14 VITALS
TEMPERATURE: 96.3 F | BODY MASS INDEX: 26.5 KG/M2 | DIASTOLIC BLOOD PRESSURE: 63 MMHG | HEIGHT: 62 IN | WEIGHT: 144 LBS | SYSTOLIC BLOOD PRESSURE: 144 MMHG | RESPIRATION RATE: 18 BRPM | HEART RATE: 77 BPM

## 2019-08-14 LAB
HCT VFR BLD CALC: 25.2 %
HGB BLD-MCNC: 8.3 G/DL
MCHC RBC-ENTMCNC: 32.9 G/DL
MCHC RBC-ENTMCNC: 36.7 PG
MCV RBC AUTO: 111.5 FL
PLATELET # BLD AUTO: 285 K/UL
PMV BLD: 10.6 FL
RBC # BLD: 2.26 M/UL
RBC # FLD: 14.6 %
WBC # FLD AUTO: 4.88 K/UL

## 2019-08-14 PROCEDURE — 99214 OFFICE O/P EST MOD 30 MIN: CPT

## 2019-08-14 RX ORDER — ERYTHROPOIETIN 10000 [IU]/ML
80000 INJECTION, SOLUTION INTRAVENOUS; SUBCUTANEOUS ONCE
Refills: 0 | Status: COMPLETED | OUTPATIENT
Start: 2019-08-14 | End: 2019-08-14

## 2019-08-14 RX ORDER — OXYCODONE AND ACETAMINOPHEN 5; 325 MG/1; MG/1
5-325 TABLET ORAL EVERY 8 HOURS
Qty: 10 | Refills: 0 | Status: DISCONTINUED | COMMUNITY
Start: 2019-02-22 | End: 2019-08-14

## 2019-08-14 RX ORDER — PREGABALIN 225 MG/1
1000 CAPSULE ORAL ONCE
Refills: 0 | Status: COMPLETED | OUTPATIENT
Start: 2019-08-14 | End: 2019-08-14

## 2019-08-14 RX ORDER — FOLIC ACID 0.8 MG
500 TABLET ORAL
Qty: 7 | Refills: 0 | Status: DISCONTINUED | COMMUNITY
Start: 2019-04-05 | End: 2019-08-14

## 2019-08-14 RX ADMIN — PREGABALIN 1000 MICROGRAM(S): 225 CAPSULE ORAL at 15:30

## 2019-08-14 RX ADMIN — ERYTHROPOIETIN 80000 UNIT(S): 10000 INJECTION, SOLUTION INTRAVENOUS; SUBCUTANEOUS at 15:31

## 2019-08-25 NOTE — ASSESSMENT
[FreeTextEntry1] : Lowrisk myelodysplastic syndrome, on Revlimid and Procrit. \par Pt was also diagnosed with B12 deficiency.\par \par PLAN:\par continue procrit 14904 units weekly.\par Pt advised to remain compliant with procrit, she was given Rx for next week when she will be away. She will be taught to self inject.\par On  Revlimid 5 mg daily 2 weeks on 1 week off.  Encouraged her to take  Imodium and also Lomotil if diarrhea is not controlled. \par Continue B12 injections monthly. \par RTC in 3 weeks.\par \par Case discussed and patient seen with Dr. Wade.\par I was present with the PA during the key portions of the history and exam. I agree with the findings and plan as documented in the PA's note, unless noted below.\par \par

## 2019-08-25 NOTE — PHYSICAL EXAM
[Fully active, able to carry on all pre-disease performance without restriction] : Status 0 - Fully active, able to carry on all pre-disease performance without restriction [Normal] : grossly intact [de-identified] : pharynx clear, no exudates [de-identified] : Abscess on left scapular region has improved, new abscess interscapular area. [de-identified] : abcess on the back has resolved completely, no evidence of recurrence.

## 2019-08-29 ENCOUNTER — RX RENEWAL (OUTPATIENT)
Age: 74
End: 2019-08-29

## 2019-09-05 ENCOUNTER — APPOINTMENT (OUTPATIENT)
Dept: INFUSION THERAPY | Facility: CLINIC | Age: 74
End: 2019-09-05

## 2019-09-05 ENCOUNTER — APPOINTMENT (OUTPATIENT)
Dept: HEMATOLOGY ONCOLOGY | Facility: CLINIC | Age: 74
End: 2019-09-05

## 2019-09-11 ENCOUNTER — LABORATORY RESULT (OUTPATIENT)
Age: 74
End: 2019-09-11

## 2019-09-11 ENCOUNTER — APPOINTMENT (OUTPATIENT)
Dept: HEMATOLOGY ONCOLOGY | Facility: CLINIC | Age: 74
End: 2019-09-11
Payer: MEDICARE

## 2019-09-11 ENCOUNTER — APPOINTMENT (OUTPATIENT)
Dept: INFUSION THERAPY | Facility: CLINIC | Age: 74
End: 2019-09-11

## 2019-09-11 VITALS
WEIGHT: 142 LBS | DIASTOLIC BLOOD PRESSURE: 53 MMHG | HEART RATE: 85 BPM | RESPIRATION RATE: 18 BRPM | HEIGHT: 62 IN | SYSTOLIC BLOOD PRESSURE: 125 MMHG | TEMPERATURE: 96.3 F | BODY MASS INDEX: 26.13 KG/M2

## 2019-09-11 LAB
HCT VFR BLD CALC: 24.9 %
HGB BLD-MCNC: 8.1 G/DL
MCHC RBC-ENTMCNC: 32.5 G/DL
MCHC RBC-ENTMCNC: 36.2 PG
MCV RBC AUTO: 111.2 FL
PLATELET # BLD AUTO: 204 K/UL
PMV BLD: 10.5 FL
RBC # BLD: 2.24 M/UL
RBC # FLD: 14.2 %
WBC # FLD AUTO: 2.92 K/UL

## 2019-09-11 PROCEDURE — 99214 OFFICE O/P EST MOD 30 MIN: CPT

## 2019-09-11 RX ORDER — PREGABALIN 225 MG/1
1000 CAPSULE ORAL ONCE
Refills: 0 | Status: COMPLETED | OUTPATIENT
Start: 2019-09-11 | End: 2019-09-11

## 2019-09-11 RX ORDER — ERYTHROPOIETIN 10000 [IU]/ML
80000 INJECTION, SOLUTION INTRAVENOUS; SUBCUTANEOUS ONCE
Refills: 0 | Status: COMPLETED | OUTPATIENT
Start: 2019-09-11 | End: 2019-09-11

## 2019-09-11 RX ADMIN — PREGABALIN 1000 MICROGRAM(S): 225 CAPSULE ORAL at 14:09

## 2019-09-11 RX ADMIN — ERYTHROPOIETIN 80000 UNIT(S): 10000 INJECTION, SOLUTION INTRAVENOUS; SUBCUTANEOUS at 14:09

## 2019-09-13 NOTE — PHYSICAL EXAM
[Fully active, able to carry on all pre-disease performance without restriction] : Status 0 - Fully active, able to carry on all pre-disease performance without restriction [Normal] : full range of motion and no deformities appreciated [de-identified] : pharynx clear, no exudates [de-identified] : Abscess on left scapular region has improved, new abscess interscapular area. [de-identified] : abcess on the back has resolved completely, no evidence of recurrence.

## 2019-09-13 NOTE — HISTORY OF PRESENT ILLNESS

## 2019-09-18 ENCOUNTER — APPOINTMENT (OUTPATIENT)
Dept: INFUSION THERAPY | Facility: CLINIC | Age: 74
End: 2019-09-18

## 2019-09-18 ENCOUNTER — LABORATORY RESULT (OUTPATIENT)
Age: 74
End: 2019-09-18

## 2019-09-18 ENCOUNTER — OTHER (OUTPATIENT)
Age: 74
End: 2019-09-18

## 2019-09-18 DIAGNOSIS — Z00.00 ENCOUNTER FOR GENERAL ADULT MEDICAL EXAMINATION W/OUT ABNORMAL FINDINGS: ICD-10-CM

## 2019-09-18 LAB
HCT VFR BLD CALC: 23.2 %
HGB BLD-MCNC: 7.6 G/DL
MCHC RBC-ENTMCNC: 32.8 G/DL
MCHC RBC-ENTMCNC: 36.4 PG
MCV RBC AUTO: 111 FL
PLATELET # BLD AUTO: 278 K/UL
PMV BLD: 11.1 FL
RBC # BLD: 2.09 M/UL
RBC # FLD: 13.8 %
WBC # FLD AUTO: 3.4 K/UL

## 2019-09-18 RX ORDER — ERYTHROPOIETIN 10000 [IU]/ML
80000 INJECTION, SOLUTION INTRAVENOUS; SUBCUTANEOUS ONCE
Refills: 0 | Status: COMPLETED | OUTPATIENT
Start: 2019-09-18 | End: 2019-09-18

## 2019-09-18 RX ADMIN — ERYTHROPOIETIN 80000 UNIT(S): 10000 INJECTION, SOLUTION INTRAVENOUS; SUBCUTANEOUS at 17:23

## 2019-09-20 ENCOUNTER — APPOINTMENT (OUTPATIENT)
Dept: HEMATOLOGY ONCOLOGY | Facility: CLINIC | Age: 74
End: 2019-09-20

## 2019-09-20 ENCOUNTER — LABORATORY RESULT (OUTPATIENT)
Age: 74
End: 2019-09-20

## 2019-09-22 LAB
HCT VFR BLD CALC: 22.1 %
HGB BLD-MCNC: 7.1 G/DL
MCHC RBC-ENTMCNC: 32.1 G/DL
MCHC RBC-ENTMCNC: 36.4 PG
MCV RBC AUTO: 113.3 FL
PLATELET # BLD AUTO: 289 K/UL
PMV BLD: 10.5 FL
RBC # BLD: 1.95 M/UL
RBC # FLD: 14 %
WBC # FLD AUTO: 4.43 K/UL

## 2019-09-26 ENCOUNTER — LABORATORY RESULT (OUTPATIENT)
Age: 74
End: 2019-09-26

## 2019-09-26 ENCOUNTER — APPOINTMENT (OUTPATIENT)
Dept: INFUSION THERAPY | Facility: CLINIC | Age: 74
End: 2019-09-26

## 2019-09-26 RX ORDER — ERYTHROPOIETIN 10000 [IU]/ML
80000 INJECTION, SOLUTION INTRAVENOUS; SUBCUTANEOUS ONCE
Refills: 0 | Status: COMPLETED | OUTPATIENT
Start: 2019-09-26 | End: 2019-09-26

## 2019-09-26 RX ADMIN — ERYTHROPOIETIN 80000 UNIT(S): 10000 INJECTION, SOLUTION INTRAVENOUS; SUBCUTANEOUS at 11:47

## 2019-09-27 LAB
HCT VFR BLD CALC: 24.9 %
HGB BLD-MCNC: 7.9 G/DL
MCHC RBC-ENTMCNC: 31.7 G/DL
MCHC RBC-ENTMCNC: 36.1 PG
MCV RBC AUTO: 113.7 FL
PLATELET # BLD AUTO: 206 K/UL
PMV BLD: 11.8 FL
RBC # BLD: 2.19 M/UL
RBC # FLD: 14.8 %
WBC # FLD AUTO: 3.51 K/UL

## 2019-10-03 ENCOUNTER — APPOINTMENT (OUTPATIENT)
Dept: INFUSION THERAPY | Facility: CLINIC | Age: 74
End: 2019-10-03

## 2019-10-03 ENCOUNTER — APPOINTMENT (OUTPATIENT)
Dept: HEMATOLOGY ONCOLOGY | Facility: CLINIC | Age: 74
End: 2019-10-03
Payer: MEDICARE

## 2019-10-03 ENCOUNTER — LABORATORY RESULT (OUTPATIENT)
Age: 74
End: 2019-10-03

## 2019-10-03 VITALS
HEART RATE: 84 BPM | DIASTOLIC BLOOD PRESSURE: 52 MMHG | WEIGHT: 141 LBS | SYSTOLIC BLOOD PRESSURE: 99 MMHG | HEIGHT: 62 IN | TEMPERATURE: 96.6 F | BODY MASS INDEX: 25.95 KG/M2

## 2019-10-03 LAB
HCT VFR BLD CALC: 23.8 %
HGB BLD-MCNC: 7.9 G/DL
MCHC RBC-ENTMCNC: 33.2 G/DL
MCHC RBC-ENTMCNC: 36.7 PG
MCV RBC AUTO: 110.7 FL
PLATELET # BLD AUTO: 348 K/UL
PMV BLD: 9.9 FL
RBC # BLD: 2.15 M/UL
RBC # FLD: 15 %
WBC # FLD AUTO: 3.02 K/UL

## 2019-10-03 PROCEDURE — 99214 OFFICE O/P EST MOD 30 MIN: CPT

## 2019-10-03 RX ORDER — ERYTHROPOIETIN 10000 [IU]/ML
80000 INJECTION, SOLUTION INTRAVENOUS; SUBCUTANEOUS ONCE
Refills: 0 | Status: COMPLETED | OUTPATIENT
Start: 2019-10-03 | End: 2019-10-03

## 2019-10-03 RX ORDER — PREGABALIN 225 MG/1
1000 CAPSULE ORAL ONCE
Refills: 0 | Status: COMPLETED | OUTPATIENT
Start: 2019-10-03 | End: 2019-10-03

## 2019-10-03 RX ADMIN — ERYTHROPOIETIN 80000 UNIT(S): 10000 INJECTION, SOLUTION INTRAVENOUS; SUBCUTANEOUS at 12:12

## 2019-10-03 RX ADMIN — PREGABALIN 1000 MICROGRAM(S): 225 CAPSULE ORAL at 12:12

## 2019-10-06 NOTE — ASSESSMENT
[FreeTextEntry1] : Lowrisk myelodysplastic syndrome, on Revlimid and Procrit. \par Pt was also diagnosed with B12 deficiency.\par \par PLAN:\par continue procrit 36615 units weekly.\par Pt advised to remain compliant with procrit,\par On  Revlimid 5 mg daily 2 weeks on 1 week off.  Encouraged her to take  Imodium and also Lomotil if diarrhea is not controlled. \par Her compliance and any side effects from such treatment were assessed at today's visit\par Side effects of  Revlimid and their management were discussed.\par Continue B12 injections monthly. \par RTC in 3 weeks.\par \par \par

## 2019-10-06 NOTE — PHYSICAL EXAM
[Fully active, able to carry on all pre-disease performance without restriction] : Status 0 - Fully active, able to carry on all pre-disease performance without restriction [Normal] : grossly intact [de-identified] : pharynx clear, no exudates [de-identified] : Abscess on left scapular region has improved, new abscess interscapular area. [de-identified] : abcess on the back has resolved completely, no evidence of recurrence.

## 2019-10-06 NOTE — HISTORY OF PRESENT ILLNESS
[de-identified] : \par  [de-identified] : She missed on appointment for procrit last week.\par She starts her next cycle on 6/25/18\par C/o back pain radiating down her left which occurred when she bent to  something.\par No change in diarrhea.\par \par 7/31/18:\par Doing well. On Revlimid for more than 2yrs, 5mg 2weeks on 1 week off. She will be starting week on tonight. \par C/o diarrhea. On Imodium 2 pills AM and 2 pills PM. Doesn't help much with diarrhea. \par C/o nausea, abd pain. \par Colonoscopy in 2016 was normal. \par Did not have blood transfusion for over 2 years. \par On procrit [id number] weekly. Missed last week on 7/24/18 as she was out of town. She has been non compliant with weekly Procrit.\par Mammogram in 2017 was normal. \par \par 9/11/18\par pt is here for follow up.\par She feels the lomotil helps with the diarrhea.\par She was away for a few weeks and missed her procrit injections.\par No fever, abdominal  discomfort has been less since since use of lomotil.\par She started a new cycle 2 days ago.\par \par 10/2/18:\par She will start Revlimid tonight (week on). 2 weeks on, 1 week off. \par On ASA 81mg. \par Denies fever, nausea, vomiting, chest pain, SOB, abdominal pain, and bladder problems.\par C/o diarrhea. \par S/p 2 units PRBC transfusion on 9/25/18. \par On Procrit 15041 units weekly. Not compliant every week. \par C/o intermittent dizziness. \par \par 10/31/18\par Pt is here for follow up.\par C/O significant fatigue.\par Continues to have diarrhea, takes imodium and lomotil as needed.\par C/o dry cough, no fever.\par Cough started a month ago. It is worse in the mornings however over last week it has been constant all day.\par No chest pain.\par She was found to have low B12 levels and was started on B12 injections.\par \par 11/27/18:\par Doing well. Hospitalized for 3 days for cellulitis/abcess of the back s/p drainage and on Abx currently. Developed 3 days after Mg infusion as per pt. \par Denies nausea, vomiting, chest pain, SOB, abdominal pain, bowel and bladder problems.\par This is the week on Revlimid (week 1).\par S/p 1 unit PRBC transfusion in the hospital. \par On Procrit weekly \par \par 12/27/18:\par Doing well. No major complaints.\par Denies fever, nausea, vomiting, chest pain, SOB, abdominal pain, and bladder problems.\par On Revlimid 5 mg 2 weeks on 1 week off. This is the week off. She will start the week on sunday (12/30/18).\par Due for Procrit 41650 units today. \par Still has diarrhea. 4-5bm/day.\par On monthly B12 injections. \par \par 1/30/19:\par Patient here for follow up for MDS.  She is taking Revlimid 5 mg, 2 weeks on, 1 week off.  She will complete this current cycle on Saturday.  She has no new complaints today.  Patient denies cough, shortness of breath, denies fever, denies bone pain.\par \par 03/04/2019\par Patient is here for a follow up visit. She complaints of severe pain in her left shoulder and has had abscess drained 2 weeks ago. However the pain is worse and she has purulent discharge on examination. She also has Nasal sores that are painful. Culture from 11/2018 showed MRSA.  Denies Fever. \par She also complaints of swelling in her right leg. Not tender and not erythematous and negative Gong;s sign. \par Her diarrhea with Revlimid is ongoing and Imodium and Lomotil helps but not everyday. \par She is on 60,000 units of procrit weekly and Revlimid 5 mg 2 weeks on 1 week off. \par \par 4/23/19\par Pt is here for follow up.\par She feels well.\par The nasal sores have improved with bactroban\par The abscess on left shoulder has resolved.\par However she has a new one on the middle of her back.\par No fever.\par Pt has been on procrit 04798 units weekly, however she missed a dose in between.\par \par 5/8/19\par pt is here for follow up.\par no new complaints.\par feels well.\par Her skin abscess has resolved.\par she has been unable to go to ID, as she could not get an appt.\par No bleeding.\par She is compliant with revlimid.\par \par 6/6/19\par Pt is here for follow up.\par no new complaints \par Feels well.\par Is on week 2 of her Revlimid cycle. \par No transfusions since last visit\par \par 7/17/19\par Pt is here for follow up.\par C/O fatigue.\par Was away for a few days two weeks ago, but missed treatment with procrit for 2 weeks.\par Is complaint with Revlimid 2 weeks on 1 week off.\par Diarrhea is a little improved.\par \par \par 8/14/19\par Patient here for follow up visit, feeling well.  Although she is complaining of some pain at the left scapula area recently.  Patient denies cough, shortness of breath, denies fever, denies other bone pain.  She is off Revlimid  this week, due to restart on Monday.  She is scheduled for Procrit and Vitamin B12 injection today.  She plans to leave the country tomorrow to visit her mother who is ill.  She will return in about 10 days.\par \par 9/11/19\par Pt is here for follow up.\par She was away for 3 weeks, out of which she took procrit for 2 weeks in [place].\par She missed last week's dose.\par She had CBCs in [place] which showed Hgb of 8.9\par She feels well.\par She still getting diarrhea.\par She has been using lomotil with very minimal relief.\par She started a new cycle of Revlimid 4 days ago.\par \par 10/3/19\par Patient here for follow up visit, feeling fairly well.   Patient denies cough, shortness of breath, denies fever, denies other bone pain.  She remains on Revlimid.  She is scheduled for Procrit and Vitamin B12 injection today.\par

## 2019-10-09 ENCOUNTER — APPOINTMENT (OUTPATIENT)
Dept: INFUSION THERAPY | Facility: CLINIC | Age: 74
End: 2019-10-09

## 2019-10-10 ENCOUNTER — LABORATORY RESULT (OUTPATIENT)
Age: 74
End: 2019-10-10

## 2019-10-10 ENCOUNTER — APPOINTMENT (OUTPATIENT)
Dept: INFUSION THERAPY | Facility: CLINIC | Age: 74
End: 2019-10-10

## 2019-10-10 LAB
ALBUMIN SERPL ELPH-MCNC: 4.6 G/DL
ALP BLD-CCNC: 89 U/L
ALT SERPL-CCNC: 13 U/L
ANION GAP SERPL CALC-SCNC: 17 MMOL/L
AST SERPL-CCNC: 13 U/L
BILIRUB SERPL-MCNC: 0.3 MG/DL
BUN SERPL-MCNC: 17 MG/DL
CALCIUM SERPL-MCNC: 9.2 MG/DL
CHLORIDE SERPL-SCNC: 104 MMOL/L
CO2 SERPL-SCNC: 17 MMOL/L
CREAT SERPL-MCNC: 1.2 MG/DL
GLUCOSE SERPL-MCNC: 207 MG/DL
HCT VFR BLD CALC: 22.8 %
HGB BLD-MCNC: 7.3 G/DL
IRON SATN MFR SERPL: 44 %
IRON SERPL-MCNC: 108 UG/DL
MCHC RBC-ENTMCNC: 32 G/DL
MCHC RBC-ENTMCNC: 36.3 PG
MCV RBC AUTO: 113.4 FL
PLATELET # BLD AUTO: 344 K/UL
PMV BLD: 9.9 FL
POTASSIUM SERPL-SCNC: 4.5 MMOL/L
PROT SERPL-MCNC: 6.8 G/DL
RBC # BLD: 2.01 M/UL
RBC # FLD: 15 %
SODIUM SERPL-SCNC: 138 MMOL/L
TIBC SERPL-MCNC: 248 UG/DL
UIBC SERPL-MCNC: 140 UG/DL
WBC # FLD AUTO: 3.24 K/UL

## 2019-10-10 RX ORDER — ERYTHROPOIETIN 10000 [IU]/ML
80000 INJECTION, SOLUTION INTRAVENOUS; SUBCUTANEOUS ONCE
Refills: 0 | Status: COMPLETED | OUTPATIENT
Start: 2019-10-10 | End: 2019-10-10

## 2019-10-10 RX ADMIN — ERYTHROPOIETIN 80000 UNIT(S): 10000 INJECTION, SOLUTION INTRAVENOUS; SUBCUTANEOUS at 12:22

## 2019-10-15 LAB
FERRITIN SERPL-MCNC: 748 NG/ML
VIT B12 SERPL-MCNC: 863 PG/ML

## 2019-10-16 ENCOUNTER — RX RENEWAL (OUTPATIENT)
Age: 74
End: 2019-10-16

## 2019-10-17 ENCOUNTER — APPOINTMENT (OUTPATIENT)
Dept: INFUSION THERAPY | Facility: CLINIC | Age: 74
End: 2019-10-17

## 2019-10-17 ENCOUNTER — OTHER (OUTPATIENT)
Age: 74
End: 2019-10-17

## 2019-10-17 ENCOUNTER — LABORATORY RESULT (OUTPATIENT)
Age: 74
End: 2019-10-17

## 2019-10-17 ENCOUNTER — OUTPATIENT (OUTPATIENT)
Dept: OUTPATIENT SERVICES | Facility: HOSPITAL | Age: 74
LOS: 1 days | Discharge: HOME | End: 2019-10-17

## 2019-10-17 DIAGNOSIS — Z79.899 OTHER LONG TERM (CURRENT) DRUG THERAPY: ICD-10-CM

## 2019-10-17 DIAGNOSIS — D46.9 MYELODYSPLASTIC SYNDROME, UNSPECIFIED: ICD-10-CM

## 2019-10-17 DIAGNOSIS — E53.8 DEFICIENCY OF OTHER SPECIFIED B GROUP VITAMINS: ICD-10-CM

## 2019-10-17 DIAGNOSIS — D64.9 ANEMIA, UNSPECIFIED: ICD-10-CM

## 2019-10-17 LAB
HCT VFR BLD CALC: 22.7 %
HGB BLD-MCNC: 7.4 G/DL
MCHC RBC-ENTMCNC: 32.6 G/DL
MCHC RBC-ENTMCNC: 36.8 PG
MCV RBC AUTO: 112.9 FL
PLATELET # BLD AUTO: 280 K/UL
PMV BLD: 10.9 FL
RBC # BLD: 2.01 M/UL
RBC # FLD: 15.6 %
WBC # FLD AUTO: 3.99 K/UL

## 2019-10-17 RX ORDER — ERYTHROPOIETIN 10000 [IU]/ML
80000 INJECTION, SOLUTION INTRAVENOUS; SUBCUTANEOUS ONCE
Refills: 0 | Status: COMPLETED | OUTPATIENT
Start: 2019-10-17 | End: 2019-10-17

## 2019-10-17 RX ADMIN — ERYTHROPOIETIN 80000 UNIT(S): 10000 INJECTION, SOLUTION INTRAVENOUS; SUBCUTANEOUS at 11:18

## 2019-10-24 ENCOUNTER — LABORATORY RESULT (OUTPATIENT)
Age: 74
End: 2019-10-24

## 2019-10-24 ENCOUNTER — APPOINTMENT (OUTPATIENT)
Dept: HEMATOLOGY ONCOLOGY | Facility: CLINIC | Age: 74
End: 2019-10-24
Payer: MEDICARE

## 2019-10-24 ENCOUNTER — APPOINTMENT (OUTPATIENT)
Dept: INFUSION THERAPY | Facility: CLINIC | Age: 74
End: 2019-10-24
Payer: MEDICARE

## 2019-10-24 VITALS
WEIGHT: 142 LBS | TEMPERATURE: 98.4 F | HEIGHT: 62 IN | BODY MASS INDEX: 26.13 KG/M2 | HEART RATE: 92 BPM | DIASTOLIC BLOOD PRESSURE: 60 MMHG | SYSTOLIC BLOOD PRESSURE: 127 MMHG

## 2019-10-24 LAB
HCT VFR BLD CALC: 23.2 %
HGB BLD-MCNC: 7.6 G/DL
MCHC RBC-ENTMCNC: 32.8 G/DL
MCHC RBC-ENTMCNC: 36.5 PG
MCV RBC AUTO: 111.5 FL
PLATELET # BLD AUTO: 320 K/UL
PMV BLD: 10.1 FL
RBC # BLD: 2.08 M/UL
RBC # FLD: 15.2 %
WBC # FLD AUTO: 3.05 K/UL

## 2019-10-24 PROCEDURE — 99214 OFFICE O/P EST MOD 30 MIN: CPT

## 2019-10-24 RX ORDER — ERYTHROPOIETIN 10000 [IU]/ML
80000 INJECTION, SOLUTION INTRAVENOUS; SUBCUTANEOUS ONCE
Refills: 0 | Status: COMPLETED | OUTPATIENT
Start: 2019-10-24 | End: 2019-10-24

## 2019-10-24 RX ADMIN — ERYTHROPOIETIN 80000 UNIT(S): 10000 INJECTION, SOLUTION INTRAVENOUS; SUBCUTANEOUS at 11:55

## 2019-10-24 NOTE — HISTORY OF PRESENT ILLNESS
[de-identified] : She missed on appointment for procrit last week.\par She starts her next cycle on 6/25/18\par C/o back pain radiating down her left which occurred when she bent to  something.\par No change in diarrhea.\par \par 7/31/18:\par Doing well. On Revlimid for more than 2yrs, 5mg 2weeks on 1 week off. She will be starting week on tonight. \par C/o diarrhea. On Imodium 2 pills AM and 2 pills PM. Doesn't help much with diarrhea. \par C/o nausea, abd pain. \par Colonoscopy in 2016 was normal. \par Did not have blood transfusion for over 2 years. \par On procrit [id number] weekly. Missed last week on 7/24/18 as she was out of town. She has been non compliant with weekly Procrit.\par Mammogram in 2017 was normal. \par \par 9/11/18\par pt is here for follow up.\par She feels the lomotil helps with the diarrhea.\par She was away for a few weeks and missed her procrit injections.\par No fever, abdominal  discomfort has been less since since use of lomotil.\par She started a new cycle 2 days ago.\par \par 10/2/18:\par She will start Revlimid tonight (week on). 2 weeks on, 1 week off. \par On ASA 81mg. \par Denies fever, nausea, vomiting, chest pain, SOB, abdominal pain, and bladder problems.\par C/o diarrhea. \par S/p 2 units PRBC transfusion on 9/25/18. \par On Procrit 53603 units weekly. Not compliant every week. \par C/o intermittent dizziness. \par \par 10/31/18\par Pt is here for follow up.\par C/O significant fatigue.\par Continues to have diarrhea, takes imodium and lomotil as needed.\par C/o dry cough, no fever.\par Cough started a month ago. It is worse in the mornings however over last week it has been constant all day.\par No chest pain.\par She was found to have low B12 levels and was started on B12 injections.\par \par 11/27/18:\par Doing well. Hospitalized for 3 days for cellulitis/abcess of the back s/p drainage and on Abx currently. Developed 3 days after Mg infusion as per pt. \par Denies nausea, vomiting, chest pain, SOB, abdominal pain, bowel and bladder problems.\par This is the week on Revlimid (week 1).\par S/p 1 unit PRBC transfusion in the hospital. \par On Procrit weekly \par \par 12/27/18:\par Doing well. No major complaints.\par Denies fever, nausea, vomiting, chest pain, SOB, abdominal pain, and bladder problems.\par On Revlimid 5 mg 2 weeks on 1 week off. This is the week off. She will start the week on sunday (12/30/18).\par Due for Procrit 75112 units today. \par Still has diarrhea. 4-5bm/day.\par On monthly B12 injections. \par \par 1/30/19:\par Patient here for follow up for MDS.  She is taking Revlimid 5 mg, 2 weeks on, 1 week off.  She will complete this current cycle on Saturday.  She has no new complaints today.  Patient denies cough, shortness of breath, denies fever, denies bone pain.\par \par 03/04/2019\par Patient is here for a follow up visit. She complaints of severe pain in her left shoulder and has had abscess drained 2 weeks ago. However the pain is worse and she has purulent discharge on examination. She also has Nasal sores that are painful. Culture from 11/2018 showed MRSA.  Denies Fever. \par She also complaints of swelling in her right leg. Not tender and not erythematous and negative Gong;s sign. \par Her diarrhea with Revlimid is ongoing and Imodium and Lomotil helps but not everyday. \par She is on 60,000 units of procrit weekly and Revlimid 5 mg 2 weeks on 1 week off. \par \par 4/23/19\par Pt is here for follow up.\par She feels well.\par The nasal sores have improved with bactroban\par The abscess on left shoulder has resolved.\par However she has a new one on the middle of her back.\par No fever.\par Pt has been on procrit 04700 units weekly, however she missed a dose in between.\par \par 5/8/19\par pt is here for follow up.\par no new complaints.\par feels well.\par Her skin abscess has resolved.\par she has been unable to go to ID, as she could not get an appt.\par No bleeding.\par She is compliant with revlimid.\par \par 6/6/19\par Pt is here for follow up.\par no new complaints \par Feels well.\par Is on week 2 of her Revlimid cycle. \par No transfusions since last visit\par \par 7/17/19\par Pt is here for follow up.\par C/O fatigue.\par Was away for a few days two weeks ago, but missed treatment with procrit for 2 weeks.\par Is complaint with Revlimid 2 weeks on 1 week off.\par Diarrhea is a little improved.\par \par \par 8/14/19\par Patient here for follow up visit, feeling well.  Although she is complaining of some pain at the left scapula area recently.  Patient denies cough, shortness of breath, denies fever, denies other bone pain.  She is off Revlimid  this week, due to restart on Monday.  She is scheduled for Procrit and Vitamin B12 injection today.  She plans to leave the country tomorrow to visit her mother who is ill.  She will return in about 10 days.\par \par 9/11/19\par Pt is here for follow up.\par She was away for 3 weeks, out of which she took procrit for 2 weeks in [employer].\par She missed last week's dose.\par She had CBCs in [employer] which showed Hgb of 8.9\par She feels well.\par She still getting diarrhea.\par She has been using lomotil with very minimal relief.\par She started a new cycle of Revlimid 4 days ago.\par \par 10/3/19\par Patient here for follow up visit, feeling fairly well.   Patient denies cough, shortness of breath, denies fever, denies other bone pain.  She remains on Revlimid.  She is scheduled for Procrit and Vitamin B12 injection today.\par \par 10/24/19\par Pt is here for follow up.\par Feels well.\par No SOB, fatigue.\par Compliant with procrit and Revl;imid.\par Remains on ASa.\par Diarrhea is unchanged.\par Pt takes imodium as needed.\par  [de-identified] : \par

## 2019-10-24 NOTE — ASSESSMENT
[FreeTextEntry1] : Lowrisk myelodysplastic syndrome, on Revlimid and Procrit. \par Pt was also diagnosed with B12 deficiency.\par \par PLAN:\par continue procrit 72105 units weekly.\par Pt advised to remain compliant with procrit,\par On  Revlimid 5 mg daily 2 weeks on 1 week off.  Encouraged her to take  Imodium and also Lomotil if diarrhea is not controlled. \par Her compliance and any side effects from such treatment were assessed at today's visit\par Side effects of  Revlimid and their management were discussed.\par Continue B12 injections monthly. \par RTC in 3 weeks.\par \par \par

## 2019-10-24 NOTE — PHYSICAL EXAM
[Fully active, able to carry on all pre-disease performance without restriction] : Status 0 - Fully active, able to carry on all pre-disease performance without restriction [Normal] : full range of motion and no deformities appreciated [de-identified] : Abscess on left scapular region has improved, new abscess interscapular area. [de-identified] : pharynx clear, no exudates [de-identified] : abcess on the back has resolved completely, no evidence of recurrence.

## 2019-10-31 ENCOUNTER — APPOINTMENT (OUTPATIENT)
Dept: INFUSION THERAPY | Facility: CLINIC | Age: 74
End: 2019-10-31

## 2019-10-31 ENCOUNTER — LABORATORY RESULT (OUTPATIENT)
Age: 74
End: 2019-10-31

## 2019-10-31 LAB
HCT VFR BLD CALC: 22.8 %
HGB BLD-MCNC: 7.5 G/DL
MCHC RBC-ENTMCNC: 32.9 G/DL
MCHC RBC-ENTMCNC: 37.3 PG
MCV RBC AUTO: 113.4 FL
PLATELET # BLD AUTO: 354 K/UL
PMV BLD: 10.7 FL
RBC # BLD: 2.01 M/UL
RBC # FLD: 15.2 %
WBC # FLD AUTO: 4.38 K/UL

## 2019-10-31 RX ORDER — ERYTHROPOIETIN 10000 [IU]/ML
80000 INJECTION, SOLUTION INTRAVENOUS; SUBCUTANEOUS ONCE
Refills: 0 | Status: COMPLETED | OUTPATIENT
Start: 2019-10-31 | End: 2019-10-31

## 2019-10-31 RX ORDER — PREGABALIN 225 MG/1
1000 CAPSULE ORAL ONCE
Refills: 0 | Status: COMPLETED | OUTPATIENT
Start: 2019-10-31 | End: 2019-10-31

## 2019-10-31 RX ADMIN — PREGABALIN 1000 MICROGRAM(S): 225 CAPSULE ORAL at 11:21

## 2019-10-31 RX ADMIN — ERYTHROPOIETIN 80000 UNIT(S): 10000 INJECTION, SOLUTION INTRAVENOUS; SUBCUTANEOUS at 11:21

## 2019-11-04 ENCOUNTER — RX RENEWAL (OUTPATIENT)
Age: 74
End: 2019-11-04

## 2019-11-07 ENCOUNTER — APPOINTMENT (OUTPATIENT)
Dept: INFUSION THERAPY | Facility: CLINIC | Age: 74
End: 2019-11-07

## 2019-11-14 ENCOUNTER — APPOINTMENT (OUTPATIENT)
Dept: HEMATOLOGY ONCOLOGY | Facility: CLINIC | Age: 74
End: 2019-11-14
Payer: MEDICARE

## 2019-11-14 ENCOUNTER — APPOINTMENT (OUTPATIENT)
Dept: INFUSION THERAPY | Facility: CLINIC | Age: 74
End: 2019-11-14
Payer: MEDICARE

## 2019-11-14 ENCOUNTER — LABORATORY RESULT (OUTPATIENT)
Age: 74
End: 2019-11-14

## 2019-11-14 VITALS
BODY MASS INDEX: 25.95 KG/M2 | DIASTOLIC BLOOD PRESSURE: 61 MMHG | HEIGHT: 62 IN | WEIGHT: 141 LBS | SYSTOLIC BLOOD PRESSURE: 114 MMHG | TEMPERATURE: 97.1 F | HEART RATE: 85 BPM

## 2019-11-14 LAB
HCT VFR BLD CALC: 23.1 %
HGB BLD-MCNC: 7.5 G/DL
MCHC RBC-ENTMCNC: 32.5 G/DL
MCHC RBC-ENTMCNC: 36.9 PG
MCV RBC AUTO: 113.8 FL
PLATELET # BLD AUTO: 246 K/UL
PMV BLD: 10.3 FL
RBC # BLD: 2.03 M/UL
RBC # FLD: 14.7 %
WBC # FLD AUTO: 2.54 K/UL

## 2019-11-14 PROCEDURE — 99214 OFFICE O/P EST MOD 30 MIN: CPT

## 2019-11-14 RX ORDER — ERYTHROPOIETIN 10000 [IU]/ML
80000 INJECTION, SOLUTION INTRAVENOUS; SUBCUTANEOUS ONCE
Refills: 0 | Status: COMPLETED | OUTPATIENT
Start: 2019-11-14 | End: 2019-11-14

## 2019-11-14 RX ADMIN — ERYTHROPOIETIN 80000 UNIT(S): 10000 INJECTION, SOLUTION INTRAVENOUS; SUBCUTANEOUS at 12:01

## 2019-11-14 NOTE — PHYSICAL EXAM
[Fully active, able to carry on all pre-disease performance without restriction] : Status 0 - Fully active, able to carry on all pre-disease performance without restriction [Normal] : grossly intact [de-identified] : pharynx clear, no exudates [de-identified] : abcess on the back has resolved completely, no evidence of recurrence.   [de-identified] : Abscess on left scapular region has improved, new abscess interscapular area.

## 2019-11-14 NOTE — HISTORY OF PRESENT ILLNESS

## 2019-11-14 NOTE — ASSESSMENT
[FreeTextEntry1] : Lowrisk myelodysplastic syndrome, on Revlimid and Procrit. \par Pt was also diagnosed with B12 deficiency.\par \par PLAN:\par continue procrit 36339 units weekly.\par On  Revlimid 5 mg daily 2 weeks on 1 week off. Start next cycle today.  Encouraged her to take  Imodium and also Lomotil if diarrhea is not controlled. \par Her compliance and any side effects from such treatment were assessed at today's visit\par Side effects of  Revlimid and their management were discussed.\par Continue B12 injections monthly. \par RTC in 3 weeks.\par \par \par

## 2019-11-21 ENCOUNTER — LABORATORY RESULT (OUTPATIENT)
Age: 74
End: 2019-11-21

## 2019-11-21 ENCOUNTER — APPOINTMENT (OUTPATIENT)
Dept: INFUSION THERAPY | Facility: CLINIC | Age: 74
End: 2019-11-21

## 2019-11-21 RX ORDER — ERYTHROPOIETIN 10000 [IU]/ML
80000 INJECTION, SOLUTION INTRAVENOUS; SUBCUTANEOUS ONCE
Refills: 0 | Status: COMPLETED | OUTPATIENT
Start: 2019-11-21 | End: 2019-11-21

## 2019-11-21 RX ADMIN — ERYTHROPOIETIN 80000 UNIT(S): 10000 INJECTION, SOLUTION INTRAVENOUS; SUBCUTANEOUS at 11:07

## 2019-11-22 LAB
HCT VFR BLD CALC: 21.7 %
HGB BLD-MCNC: 7 G/DL
MCHC RBC-ENTMCNC: 32.3 G/DL
MCHC RBC-ENTMCNC: 36.3 PG
MCV RBC AUTO: 112.4 FL
PLATELET # BLD AUTO: 320 K/UL
PMV BLD: 10.5 FL
RBC # BLD: 1.93 M/UL
RBC # FLD: 14.5 %
WBC # FLD AUTO: 3.13 K/UL

## 2019-11-26 ENCOUNTER — APPOINTMENT (OUTPATIENT)
Dept: INFUSION THERAPY | Facility: CLINIC | Age: 74
End: 2019-11-26

## 2019-11-27 ENCOUNTER — RX RENEWAL (OUTPATIENT)
Age: 74
End: 2019-11-27

## 2019-11-29 ENCOUNTER — APPOINTMENT (OUTPATIENT)
Dept: INFUSION THERAPY | Facility: CLINIC | Age: 74
End: 2019-11-29

## 2019-12-05 ENCOUNTER — APPOINTMENT (OUTPATIENT)
Dept: HEMATOLOGY ONCOLOGY | Facility: CLINIC | Age: 74
End: 2019-12-05
Payer: MEDICARE

## 2019-12-05 ENCOUNTER — APPOINTMENT (OUTPATIENT)
Dept: INFUSION THERAPY | Facility: CLINIC | Age: 74
End: 2019-12-05
Payer: MEDICARE

## 2019-12-05 ENCOUNTER — LABORATORY RESULT (OUTPATIENT)
Age: 74
End: 2019-12-05

## 2019-12-05 VITALS
HEIGHT: 62 IN | DIASTOLIC BLOOD PRESSURE: 68 MMHG | WEIGHT: 145 LBS | HEART RATE: 82 BPM | SYSTOLIC BLOOD PRESSURE: 126 MMHG | BODY MASS INDEX: 26.68 KG/M2 | RESPIRATION RATE: 16 BRPM | TEMPERATURE: 97.8 F

## 2019-12-05 LAB
HCT VFR BLD CALC: 20.5 %
HGB BLD-MCNC: 6.8 G/DL
MCHC RBC-ENTMCNC: 33.2 G/DL
MCHC RBC-ENTMCNC: 37.4 PG
MCV RBC AUTO: 112.6 FL
PLATELET # BLD AUTO: 264 K/UL
PMV BLD: 10.3 FL
RBC # BLD: 1.82 M/UL
RBC # FLD: 15.5 %
WBC # FLD AUTO: 3.15 K/UL

## 2019-12-05 PROCEDURE — 99215 OFFICE O/P EST HI 40 MIN: CPT

## 2019-12-05 RX ORDER — ERYTHROPOIETIN 10000 [IU]/ML
80000 INJECTION, SOLUTION INTRAVENOUS; SUBCUTANEOUS ONCE
Refills: 0 | Status: COMPLETED | OUTPATIENT
Start: 2019-12-05 | End: 2019-12-05

## 2019-12-05 RX ADMIN — ERYTHROPOIETIN 80000 UNIT(S): 10000 INJECTION, SOLUTION INTRAVENOUS; SUBCUTANEOUS at 12:30

## 2019-12-05 NOTE — PHYSICAL EXAM
[Fully active, able to carry on all pre-disease performance without restriction] : Status 0 - Fully active, able to carry on all pre-disease performance without restriction [Normal] : grossly intact [de-identified] : pharynx clear, no exudates [de-identified] : abcess on the back has resolved completely, no evidence of recurrence.   [de-identified] : Abscess on left scapular region has improved, new abscess interscapular area.

## 2019-12-05 NOTE — ASSESSMENT
[FreeTextEntry1] : Lowrisk myelodysplastic syndrome, on Revlimid and Procrit. \par Pt was also diagnosed with B12 deficiency.\par Hgb has dropped below her baseline.\par \par PLAN:\par continue procrit 50591 units weekly.\par transfuse 1 unit PRBCs \par On  Revlimid 5 mg daily 2 weeks on 1 week off. Start next cycle on 12/7/19.  Encouraged her to take  Imodium and also Lomotil if diarrhea is not controlled. \par Her compliance and any side effects from such treatment were assessed at today's visit\par Side effects of  Revlimid and their management were discussed.\par Continue B12 injections monthly. \par Will send NGS and flow today.\par influenza vaccine to be done today.\par RTC in 3 weeks.\par \par \par

## 2019-12-05 NOTE — HISTORY OF PRESENT ILLNESS

## 2019-12-06 ENCOUNTER — APPOINTMENT (OUTPATIENT)
Dept: INFUSION THERAPY | Facility: CLINIC | Age: 74
End: 2019-12-06

## 2019-12-06 LAB
ABO + RH PNL BLD: NORMAL
BLD GP AB SCN SERPL QL: NORMAL

## 2019-12-12 ENCOUNTER — LABORATORY RESULT (OUTPATIENT)
Age: 74
End: 2019-12-12

## 2019-12-12 ENCOUNTER — APPOINTMENT (OUTPATIENT)
Dept: INFUSION THERAPY | Facility: CLINIC | Age: 74
End: 2019-12-12

## 2019-12-12 LAB
HCT VFR BLD CALC: 27.6 %
HGB BLD-MCNC: 9 G/DL
MCHC RBC-ENTMCNC: 32.6 G/DL
MCHC RBC-ENTMCNC: 35.6 PG
MCV RBC AUTO: 109.1 FL
PLATELET # BLD AUTO: 402 K/UL
PMV BLD: 9.7 FL
RBC # BLD: 2.53 M/UL
RBC # FLD: 16.9 %
WBC # FLD AUTO: 2.99 K/UL

## 2019-12-12 RX ORDER — ERYTHROPOIETIN 10000 [IU]/ML
80000 INJECTION, SOLUTION INTRAVENOUS; SUBCUTANEOUS ONCE
Refills: 0 | Status: COMPLETED | OUTPATIENT
Start: 2019-12-12 | End: 2019-12-12

## 2019-12-12 RX ADMIN — ERYTHROPOIETIN 80000 UNIT(S): 10000 INJECTION, SOLUTION INTRAVENOUS; SUBCUTANEOUS at 12:23

## 2019-12-19 ENCOUNTER — APPOINTMENT (OUTPATIENT)
Dept: INFUSION THERAPY | Facility: CLINIC | Age: 74
End: 2019-12-19

## 2019-12-19 ENCOUNTER — LABORATORY RESULT (OUTPATIENT)
Age: 74
End: 2019-12-19

## 2019-12-19 RX ORDER — PREGABALIN 225 MG/1
1000 CAPSULE ORAL ONCE
Refills: 0 | Status: COMPLETED | OUTPATIENT
Start: 2019-12-19 | End: 2019-12-19

## 2019-12-19 RX ORDER — ERYTHROPOIETIN 10000 [IU]/ML
80000 INJECTION, SOLUTION INTRAVENOUS; SUBCUTANEOUS ONCE
Refills: 0 | Status: COMPLETED | OUTPATIENT
Start: 2019-12-19 | End: 2019-12-19

## 2019-12-19 RX ADMIN — ERYTHROPOIETIN 80000 UNIT(S): 10000 INJECTION, SOLUTION INTRAVENOUS; SUBCUTANEOUS at 12:06

## 2019-12-19 RX ADMIN — PREGABALIN 1000 MICROGRAM(S): 225 CAPSULE ORAL at 12:06

## 2019-12-20 LAB
HCT VFR BLD CALC: 24 %
HGB BLD-MCNC: 8 G/DL
MCHC RBC-ENTMCNC: 33.3 G/DL
MCHC RBC-ENTMCNC: 36.4 PG
MCV RBC AUTO: 109.1 FL
PLATELET # BLD AUTO: 332 K/UL
PMV BLD: 10.4 FL
RBC # BLD: 2.2 M/UL
RBC # FLD: 16.2 %
WBC # FLD AUTO: 2.99 K/UL

## 2019-12-30 ENCOUNTER — APPOINTMENT (OUTPATIENT)
Dept: INFUSION THERAPY | Facility: CLINIC | Age: 74
End: 2019-12-30

## 2019-12-30 ENCOUNTER — LABORATORY RESULT (OUTPATIENT)
Age: 74
End: 2019-12-30

## 2019-12-30 RX ORDER — ERYTHROPOIETIN 10000 [IU]/ML
80000 INJECTION, SOLUTION INTRAVENOUS; SUBCUTANEOUS ONCE
Refills: 0 | Status: COMPLETED | OUTPATIENT
Start: 2019-12-30 | End: 2019-12-30

## 2019-12-30 RX ADMIN — ERYTHROPOIETIN 80000 UNIT(S): 10000 INJECTION, SOLUTION INTRAVENOUS; SUBCUTANEOUS at 13:24

## 2020-01-07 LAB
HCT VFR BLD CALC: 24.7 %
HGB BLD-MCNC: 8.2 G/DL
MCHC RBC-ENTMCNC: 33.2 G/DL
MCHC RBC-ENTMCNC: 36.3 PG
MCV RBC AUTO: 109.3 FL
PLATELET # BLD AUTO: 233 K/UL
PMV BLD: 9.7 FL
RBC # BLD: 2.26 M/UL
RBC # FLD: 16.1 %
WBC # FLD AUTO: 3.42 K/UL

## 2020-01-08 ENCOUNTER — FORM ENCOUNTER (OUTPATIENT)
Age: 75
End: 2020-01-08

## 2020-01-09 ENCOUNTER — LABORATORY RESULT (OUTPATIENT)
Age: 75
End: 2020-01-09

## 2020-01-09 ENCOUNTER — INPATIENT (INPATIENT)
Facility: HOSPITAL | Age: 75
LOS: 3 days | Discharge: HOME | End: 2020-01-13
Attending: INTERNAL MEDICINE | Admitting: INTERNAL MEDICINE
Payer: MEDICARE

## 2020-01-09 ENCOUNTER — APPOINTMENT (OUTPATIENT)
Dept: INFUSION THERAPY | Facility: CLINIC | Age: 75
End: 2020-01-09

## 2020-01-09 ENCOUNTER — APPOINTMENT (OUTPATIENT)
Dept: HEMATOLOGY ONCOLOGY | Facility: CLINIC | Age: 75
End: 2020-01-09
Payer: MEDICARE

## 2020-01-09 ENCOUNTER — OUTPATIENT (OUTPATIENT)
Dept: OUTPATIENT SERVICES | Facility: HOSPITAL | Age: 75
LOS: 1 days | Discharge: HOME | End: 2020-01-09
Payer: MEDICARE

## 2020-01-09 VITALS
DIASTOLIC BLOOD PRESSURE: 61 MMHG | OXYGEN SATURATION: 96 % | RESPIRATION RATE: 16 BRPM | HEART RATE: 105 BPM | TEMPERATURE: 101 F | SYSTOLIC BLOOD PRESSURE: 137 MMHG

## 2020-01-09 VITALS
DIASTOLIC BLOOD PRESSURE: 65 MMHG | HEART RATE: 106 BPM | HEIGHT: 62 IN | WEIGHT: 141 LBS | BODY MASS INDEX: 25.95 KG/M2 | SYSTOLIC BLOOD PRESSURE: 147 MMHG | TEMPERATURE: 101.4 F

## 2020-01-09 DIAGNOSIS — N17.9 ACUTE KIDNEY FAILURE, UNSPECIFIED: ICD-10-CM

## 2020-01-09 DIAGNOSIS — D46.9 MYELODYSPLASTIC SYNDROME, UNSPECIFIED: ICD-10-CM

## 2020-01-09 DIAGNOSIS — J18.9 PNEUMONIA, UNSPECIFIED ORGANISM: ICD-10-CM

## 2020-01-09 DIAGNOSIS — R50.9 FEVER, UNSPECIFIED: ICD-10-CM

## 2020-01-09 DIAGNOSIS — R05 COUGH: ICD-10-CM

## 2020-01-09 LAB
ALBUMIN SERPL ELPH-MCNC: 4.2 G/DL — SIGNIFICANT CHANGE UP (ref 3.5–5.2)
ALBUMIN SERPL ELPH-MCNC: 4.3 G/DL
ALP BLD-CCNC: 77 U/L
ALP SERPL-CCNC: 80 U/L — SIGNIFICANT CHANGE UP (ref 30–115)
ALT FLD-CCNC: 21 U/L — SIGNIFICANT CHANGE UP (ref 0–41)
ALT SERPL-CCNC: 24 U/L
ANION GAP SERPL CALC-SCNC: 18 MMOL/L — HIGH (ref 7–14)
ANION GAP SERPL CALC-SCNC: 19 MMOL/L
ANISOCYTOSIS BLD QL: SLIGHT
ANISOCYTOSIS BLD QL: SLIGHT — SIGNIFICANT CHANGE UP
APTT BLD: 18.5 SEC — CRITICAL LOW (ref 27–39.2)
AST SERPL-CCNC: 13 U/L
AST SERPL-CCNC: 14 U/L — SIGNIFICANT CHANGE UP (ref 0–41)
BASE EXCESS BLDV CALC-SCNC: -4.7 MMOL/L — LOW (ref -2–2)
BASOPHILS # BLD AUTO: 0 K/UL — SIGNIFICANT CHANGE UP (ref 0–0.2)
BASOPHILS NFR BLD AUTO: 0 % — SIGNIFICANT CHANGE UP (ref 0–1)
BILIRUB SERPL-MCNC: 0.8 MG/DL — SIGNIFICANT CHANGE UP (ref 0.2–1.2)
BILIRUB SERPL-MCNC: 1.1 MG/DL
BUN SERPL-MCNC: 19 MG/DL
BUN SERPL-MCNC: 23 MG/DL — HIGH (ref 10–20)
BURR CELLS BLD QL SMEAR: SLIGHT
CA-I SERPL-SCNC: 1.22 MMOL/L — SIGNIFICANT CHANGE UP (ref 1.12–1.3)
CALCIUM SERPL-MCNC: 9.1 MG/DL
CALCIUM SERPL-MCNC: 9.1 MG/DL — SIGNIFICANT CHANGE UP (ref 8.5–10.1)
CHLORIDE SERPL-SCNC: 96 MMOL/L
CHLORIDE SERPL-SCNC: 97 MMOL/L — LOW (ref 98–110)
CO2 SERPL-SCNC: 18 MMOL/L
CO2 SERPL-SCNC: 18 MMOL/L — SIGNIFICANT CHANGE UP (ref 17–32)
CREAT SERPL-MCNC: 1.6 MG/DL — HIGH (ref 0.7–1.5)
CREAT SERPL-MCNC: 1.7 MG/DL
DACRYOCYTES BLD QL SMEAR: SLIGHT
EOSINOPHIL # BLD AUTO: 0.16 K/UL — SIGNIFICANT CHANGE UP (ref 0–0.7)
EOSINOPHIL NFR BLD AUTO: 1 %
EOSINOPHIL NFR BLD AUTO: 1.7 % — SIGNIFICANT CHANGE UP (ref 0–8)
FLU A RESULT: NEGATIVE — SIGNIFICANT CHANGE UP
FLU A RESULT: NEGATIVE — SIGNIFICANT CHANGE UP
FLUAV AG NPH QL: NEGATIVE — SIGNIFICANT CHANGE UP
FLUBV AG NPH QL: NEGATIVE — SIGNIFICANT CHANGE UP
GAS PNL BLDV: 135 MMOL/L — LOW (ref 136–145)
GAS PNL BLDV: SIGNIFICANT CHANGE UP
GAS PNL BLDV: SIGNIFICANT CHANGE UP
GIANT PLATELETS BLD QL SMEAR: PRESENT
GIANT PLATELETS BLD QL SMEAR: PRESENT — SIGNIFICANT CHANGE UP
GLUCOSE SERPL-MCNC: 211 MG/DL — HIGH (ref 70–99)
GLUCOSE SERPL-MCNC: 230 MG/DL
HCO3 BLDV-SCNC: 21 MMOL/L — LOW (ref 22–29)
HCT VFR BLD CALC: 21 %
HCT VFR BLD CALC: 21.8 % — LOW (ref 37–47)
HCT VFR BLD CALC: 24.9 %
HCT VFR BLDA CALC: 20.8 % — LOW (ref 34–44)
HGB BLD CALC-MCNC: 6.8 G/DL — LOW (ref 14–18)
HGB BLD-MCNC: 7.1 G/DL
HGB BLD-MCNC: 7.1 G/DL — LOW (ref 12–16)
HGB BLD-MCNC: 8.4 G/DL
HYPERCHROMIA BLD QL AUTO: SLIGHT
INR BLD: 1.27 RATIO — SIGNIFICANT CHANGE UP (ref 0.65–1.3)
LACTATE BLDV-MCNC: 2.2 MMOL/L — HIGH (ref 0.5–1.6)
LACTATE SERPL-SCNC: 2.1 MMOL/L — HIGH (ref 0.7–2)
LACTATE SERPL-SCNC: 2.4 MMOL/L — HIGH (ref 0.7–2)
LYMPHOCYTES # BLD AUTO: 0.76 K/UL — LOW (ref 1.2–3.4)
LYMPHOCYTES # BLD AUTO: 7.9 % — LOW (ref 20.5–51.1)
LYMPHOCYTES NFR BLD AUTO: 5 %
MANUAL SMEAR VERIFICATION: SIGNIFICANT CHANGE UP
MCHC RBC-ENTMCNC: 32.6 G/DL — SIGNIFICANT CHANGE UP (ref 32–37)
MCHC RBC-ENTMCNC: 33.7 G/DL
MCHC RBC-ENTMCNC: 33.8 G/DL
MCHC RBC-ENTMCNC: 36.4 PG
MCHC RBC-ENTMCNC: 36.7 PG
MCHC RBC-ENTMCNC: 37 PG — HIGH (ref 27–31)
MCV RBC AUTO: 107.7 FL
MCV RBC AUTO: 108.7 FL
MCV RBC AUTO: 113.5 FL — HIGH (ref 81–99)
MICROCYTES BLD QL SMEAR: NORMAL
MONOCYTES # BLD AUTO: 0.42 K/UL — SIGNIFICANT CHANGE UP (ref 0.1–0.6)
MONOCYTES NFR BLD AUTO: 4.4 % — SIGNIFICANT CHANGE UP (ref 1.7–9.3)
MONOCYTES NFR BLD AUTO: 5 %
NEUTROPHILS # BLD AUTO: 8.11 K/UL — HIGH (ref 1.4–6.5)
NEUTROPHILS NFR BLD AUTO: 84 %
NEUTROPHILS NFR BLD AUTO: 84.2 % — HIGH (ref 42.2–75.2)
NEUTS BAND NFR BLD MANUAL: 5 %
OVALOCYTES BLD QL SMEAR: SLIGHT
PCO2 BLDV: 42 MMHG — SIGNIFICANT CHANGE UP (ref 41–51)
PH BLDV: 7.32 — SIGNIFICANT CHANGE UP (ref 7.26–7.43)
PLAT MORPH BLD: NORMAL
PLAT MORPH BLD: NORMAL — SIGNIFICANT CHANGE UP
PLATELET # BLD AUTO: 354 K/UL
PLATELET # BLD AUTO: 367 K/UL — SIGNIFICANT CHANGE UP (ref 130–400)
PLATELET # BLD AUTO: 410 K/UL
PLT ESTIMATE: ABNORMAL
PMV BLD: 10.4 FL
PMV BLD: 10.9 FL
PO2 BLDV: 24 MMHG — SIGNIFICANT CHANGE UP (ref 20–40)
POIKILOCYTOSIS BLD QL AUTO: SLIGHT — SIGNIFICANT CHANGE UP
POLYCHROMASIA BLD QL SMEAR: SLIGHT — SIGNIFICANT CHANGE UP
POTASSIUM BLDV-SCNC: 3.7 MMOL/L — SIGNIFICANT CHANGE UP (ref 3.3–5.6)
POTASSIUM SERPL-MCNC: 4.3 MMOL/L — SIGNIFICANT CHANGE UP (ref 3.5–5)
POTASSIUM SERPL-SCNC: 4.2 MMOL/L
POTASSIUM SERPL-SCNC: 4.3 MMOL/L — SIGNIFICANT CHANGE UP (ref 3.5–5)
PROT SERPL-MCNC: 6.8 G/DL
PROT SERPL-MCNC: 6.8 G/DL — SIGNIFICANT CHANGE UP (ref 6–8)
PROTHROM AB SERPL-ACNC: 14.6 SEC — HIGH (ref 9.95–12.87)
RBC # BLD: 1.92 M/UL — LOW (ref 4.2–5.4)
RBC # BLD: 1.95 M/UL
RBC # BLD: 2.29 M/UL
RBC # FLD: 16.4 %
RBC # FLD: 16.6 %
RBC # FLD: 16.6 % — HIGH (ref 11.5–14.5)
RBC BLD AUTO: ABNORMAL
RBC BLD AUTO: NORMAL
ROULEAUX BLD QL SMEAR: PRESENT
RSV RESULT: NEGATIVE — SIGNIFICANT CHANGE UP
RSV RNA RESP QL NAA+PROBE: NEGATIVE — SIGNIFICANT CHANGE UP
SAO2 % BLDV: 33 % — SIGNIFICANT CHANGE UP
SODIUM SERPL-SCNC: 133 MMOL/L
SODIUM SERPL-SCNC: 133 MMOL/L — LOW (ref 135–146)
VARIANT LYMPHS # BLD: 1.8 % — SIGNIFICANT CHANGE UP (ref 0–5)
WBC # BLD: 9.63 K/UL — SIGNIFICANT CHANGE UP (ref 4.8–10.8)
WBC # FLD AUTO: 10.85 K/UL
WBC # FLD AUTO: 12.12 K/UL
WBC # FLD AUTO: 9.63 K/UL — SIGNIFICANT CHANGE UP (ref 4.8–10.8)

## 2020-01-09 PROCEDURE — 99215 OFFICE O/P EST HI 40 MIN: CPT

## 2020-01-09 PROCEDURE — 71046 X-RAY EXAM CHEST 2 VIEWS: CPT | Mod: 26

## 2020-01-09 PROCEDURE — 99285 EMERGENCY DEPT VISIT HI MDM: CPT | Mod: GC

## 2020-01-09 PROCEDURE — 93010 ELECTROCARDIOGRAM REPORT: CPT

## 2020-01-09 RX ORDER — ACETAMINOPHEN 500 MG
650 TABLET ORAL ONCE
Refills: 0 | Status: COMPLETED | OUTPATIENT
Start: 2020-01-09 | End: 2020-01-09

## 2020-01-09 RX ORDER — VANCOMYCIN HCL 1 G
1000 VIAL (EA) INTRAVENOUS ONCE
Refills: 0 | Status: COMPLETED | OUTPATIENT
Start: 2020-01-09 | End: 2020-01-09

## 2020-01-09 RX ORDER — SODIUM CHLORIDE 9 MG/ML
500 INJECTION INTRAMUSCULAR; INTRAVENOUS; SUBCUTANEOUS
Refills: 0 | Status: DISCONTINUED | OUTPATIENT
Start: 2020-01-09 | End: 2020-04-28

## 2020-01-09 RX ORDER — ERYTHROPOIETIN 10000 [IU]/ML
80000 INJECTION, SOLUTION INTRAVENOUS; SUBCUTANEOUS ONCE
Refills: 0 | Status: COMPLETED | OUTPATIENT
Start: 2020-01-09 | End: 2020-01-09

## 2020-01-09 RX ORDER — SODIUM CHLORIDE 9 MG/ML
2000 INJECTION, SOLUTION INTRAVENOUS ONCE
Refills: 0 | Status: COMPLETED | OUTPATIENT
Start: 2020-01-09 | End: 2020-01-09

## 2020-01-09 RX ORDER — CEFEPIME 1 G/1
2000 INJECTION, POWDER, FOR SOLUTION INTRAMUSCULAR; INTRAVENOUS ONCE
Refills: 0 | Status: COMPLETED | OUTPATIENT
Start: 2020-01-09 | End: 2020-01-09

## 2020-01-09 RX ADMIN — CEFEPIME 100 MILLIGRAM(S): 1 INJECTION, POWDER, FOR SOLUTION INTRAMUSCULAR; INTRAVENOUS at 20:25

## 2020-01-09 RX ADMIN — ERYTHROPOIETIN 80000 UNIT(S): 10000 INJECTION, SOLUTION INTRAVENOUS; SUBCUTANEOUS at 12:45

## 2020-01-09 RX ADMIN — Medication 250 MILLIGRAM(S): at 20:56

## 2020-01-09 RX ADMIN — SODIUM CHLORIDE 250 MILLILITER(S): 9 INJECTION INTRAMUSCULAR; INTRAVENOUS; SUBCUTANEOUS at 13:00

## 2020-01-09 RX ADMIN — Medication 650 MILLIGRAM(S): at 15:47

## 2020-01-09 RX ADMIN — SODIUM CHLORIDE 2000 MILLILITER(S): 9 INJECTION, SOLUTION INTRAVENOUS at 20:22

## 2020-01-09 RX ADMIN — Medication 650 MILLIGRAM(S): at 14:15

## 2020-01-09 RX ADMIN — SODIUM CHLORIDE 500 MILLILITER(S): 9 INJECTION INTRAMUSCULAR; INTRAVENOUS; SUBCUTANEOUS at 14:00

## 2020-01-09 RX ADMIN — Medication 650 MILLIGRAM(S): at 20:55

## 2020-01-09 NOTE — ASSESSMENT
[FreeTextEntry1] : Lowrisk myelodysplastic syndrome, on Revlimid and Procrit. \par Pt was also diagnosed with B12 deficiency.\par Hgb has dropped below her baseline.\par Pt has findings of right sided pneumonia.\par CMP shows [facility]CO ( results recd after pt left the office)\par \par PLAN:\par Pt presented with fever and was informed of the possibility of pneumonia. She was tachycardiac and was recommended to be transferred to ER.\par She refused despite being advised multiple times as she had to  her 5 and 3 year old grandchildren and no other family member was available to help her.\par \par She was sent for a CXR with the transporter. I spoke to the radiologist who confirmed the presence of right sided consolidation.\par She was hydrated, blood and urine cultures were drawn.\par She was treated with IV antibiotics Levaquin 750mg IV x 1.\par She continued to remain febrile and was again advised to go to ER. Pt refused.\par She left the unit in a stable condition.\par Pt understands that she is very ill and despite that chose not to comply with our recommendations .\par \par \par I called her after the CMP was resulted and showed elevated creatinine.\par I recommended that she come to ER tonight ASAP.\par She has agreed, therefore I did not send a prescription for ABX to her pharmacy.\par However if she does not report to ER, may consider prescribing Avelox po x 7 days.\par \par continue procrit 30613 units weekly.\par \par On  Revlimid 5 mg daily 2 weeks on 1 week off. \par Continue B12 injections monthly. \par \par \par \par

## 2020-01-09 NOTE — ED PROVIDER NOTE - NS ED ROS FT
Constitutional: See HPI.  Eyes: No visual changes, eye pain or discharge. No Photophobia  ENMT: No hearing changes, pain, discharge or infections. No neck pain or stiffness. No limited ROM  Cardiac: No SOB or edema. No chest pain with exertion.  Respiratory: see hpi   GI: No nausea, vomiting, diarrhea or abdominal pain.  : No dysuria, frequency or burning. No Discharge  MS: see hpi   Neuro: No headache or weakness. No LOC.  Skin: No skin rash.  Except as documented in the HPI, all other systems are negative.

## 2020-01-09 NOTE — ED ADULT NURSE NOTE - OBJECTIVE STATEMENT
pt presents to ed with c/o  fever, chills, non productive cough. pt states she was seen by her primary and was diagnosed with pneumonia. pt sent to ed for evaluation. pt denies chest pain, shortness of breath, abdominal pain, sick contacts,

## 2020-01-09 NOTE — ED ADULT TRIAGE NOTE - CHIEF COMPLAINT QUOTE
Right upper back pain with fever and cough x 4 days. pt had x-ray with Pneumonia this morning. Pt also states " I was told by my doctors my kidney are not functioning well". pt denies SOB or chest pain

## 2020-01-09 NOTE — ED PROVIDER NOTE - PHYSICAL EXAMINATION
VITAL SIGNS: I have reviewed nursing notes and confirm.  CONSTITUTIONAL: well-appearing, non-toxic  SKIN: Warm dry, normal skin turgor  HEAD: NCAT  EYES: EOMI, PERRLA, no scleral icterus  ENT: Moist mucous membranes, normal pharynx with no erythema or exudates  NECK: Supple; non tender. Full ROM. No cervical LAD  CARD: RRR, no murmurs, rubs or gallops  RESP: right sided rhonchi middle and lower lobes, clear otherwise, no tachypnea.   ABD: soft, + BS, non-tender, non-distended, no rebound or guarding. No CVA tenderness  EXT: Full ROM, no bony tenderness, no pedal edema, no calf tenderness  NEURO: normal motor. normal sensory. CN II-XII intact. Cerebellar testing normal. Normal gait.  PSYCH: Cooperative, appropriate.

## 2020-01-09 NOTE — ED ADULT NURSE NOTE - NSIMPLEMENTINTERV_GEN_ALL_ED
Implemented All Fall Risk Interventions:  [place] to call system. Call bell, personal items and telephone within reach. Instruct patient to call for assistance. Room bathroom lighting operational. Non-slip footwear when patient is off stretcher. Physically safe environment: no spills, clutter or unnecessary equipment. Stretcher in lowest position, wheels locked, appropriate side rails in place. Provide visual cue, wrist band, yellow gown, etc. Monitor gait and stability. Monitor for mental status changes and reorient to person, place, and time. Review medications for side effects contributing to fall risk. Reinforce activity limits and safety measures with patient and family.

## 2020-01-09 NOTE — REVIEW OF SYSTEMS
[Cough] : cough [Shortness Of Breath] : shortness of breath [Diarrhea] : diarrhea [Negative] : Allergic/Immunologic [FreeTextEntry6] : chronic, intermittent SOB

## 2020-01-09 NOTE — RESULTS/DATA
[FreeTextEntry1] :  XR CHEST PA LAT 2V \par \par \par PROCEDURE DATE: 01/09/2020 \par \par \par \par \par INTERPRETATION: CLINICAL HISTORY: cough. Back pain and cough for one to 2 \par weeks. \par \par COMPARISON: 11/24/2018. \par \par TECHNIQUE: Portable frontal chest radiograph. Adequate positioning. \par \par FINDINGS: \par \par Support devices: None. \par \par Cardiac/mediastinum/hilum: Unremarkable. \par \par Lung parenchyma/Pleura: Focal consolidation in the superior segment of the \par right lower lobe. Trace right parapneumonic effusion. No pneumothorax. \par \par Skeleton/soft tissues: Unremarkable. \par \par \par IMPRESSION: \par \par Focal consolidation in the superior segment of the right lower lobe. \par \par Trace right parapneumonic effusion.

## 2020-01-09 NOTE — ED PROVIDER NOTE - OBJECTIVE STATEMENT
74 y/o F w/ pmhx of myelodysplastic syndrome, DM p/w fever, right sided back pain from shoulder to inferior scapular region for 4 days associated with tactile fever, chills, mild cough, non-productive. no chest pain, no shortness of breath, no abdominal pain, no myalgias, denies sick contacts, was seen in Dr. Wade office today for Procrit injection and sent to ED for evaluation.

## 2020-01-09 NOTE — PHYSICAL EXAM
[Fully active, able to carry on all pre-disease performance without restriction] : Status 0 - Fully active, able to carry on all pre-disease performance without restriction [Normal] : normal appearance, no rash, nodules, vesicles, ulcers, erythema [de-identified] : B/L air entry, crepts on th eright lower and mid lung fields [de-identified] : pharynx clear, no exudates

## 2020-01-09 NOTE — HISTORY OF PRESENT ILLNESS
[de-identified] : She missed on appointment for procrit last week.\par She starts her next cycle on 6/25/18\par C/o back pain radiating down her left which occurred when she bent to  something.\par No change in diarrhea.\par \par 7/31/18:\par Doing well. On Revlimid for more than 2yrs, 5mg 2weeks on 1 week off. She will be starting week on tonight. \par C/o diarrhea. On Imodium 2 pills AM and 2 pills PM. Doesn't help much with diarrhea. \par C/o nausea, abd pain. \par Colonoscopy in 2016 was normal. \par Did not have blood transfusion for over 2 years. \par On procrit [id number] weekly. Missed last week on 7/24/18 as she was out of town. She has been non compliant with weekly Procrit.\par Mammogram in 2017 was normal. \par \par 9/11/18\par pt is here for follow up.\par She feels the lomotil helps with the diarrhea.\par She was away for a few weeks and missed her procrit injections.\par No fever, abdominal  discomfort has been less since since use of lomotil.\par She started a new cycle 2 days ago.\par \par 10/2/18:\par She will start Revlimid tonight (week on). 2 weeks on, 1 week off. \par On ASA 81mg. \par Denies fever, nausea, vomiting, chest pain, SOB, abdominal pain, and bladder problems.\par C/o diarrhea. \par S/p 2 units PRBC transfusion on 9/25/18. \par On Procrit 52041 units weekly. Not compliant every week. \par C/o intermittent dizziness. \par \par 10/31/18\par Pt is here for follow up.\par C/O significant fatigue.\par Continues to have diarrhea, takes imodium and lomotil as needed.\par C/o dry cough, no fever.\par Cough started a month ago. It is worse in the mornings however over last week it has been constant all day.\par No chest pain.\par She was found to have low B12 levels and was started on B12 injections.\par \par 11/27/18:\par Doing well. Hospitalized for 3 days for cellulitis/abcess of the back s/p drainage and on Abx currently. Developed 3 days after Mg infusion as per pt. \par Denies nausea, vomiting, chest pain, SOB, abdominal pain, bowel and bladder problems.\par This is the week on Revlimid (week 1).\par S/p 1 unit PRBC transfusion in the hospital. \par On Procrit weekly \par \par 12/27/18:\par Doing well. No major complaints.\par Denies fever, nausea, vomiting, chest pain, SOB, abdominal pain, and bladder problems.\par On Revlimid 5 mg 2 weeks on 1 week off. This is the week off. She will start the week on sunday (12/30/18).\par Due for Procrit 93443 units today. \par Still has diarrhea. 4-5bm/day.\par On monthly B12 injections. \par \par 1/30/19:\par Patient here for follow up for MDS.  She is taking Revlimid 5 mg, 2 weeks on, 1 week off.  She will complete this current cycle on Saturday.  She has no new complaints today.  Patient denies cough, shortness of breath, denies fever, denies bone pain.\par \par 03/04/2019\par Patient is here for a follow up visit. She complaints of severe pain in her left shoulder and has had abscess drained 2 weeks ago. However the pain is worse and she has purulent discharge on examination. She also has Nasal sores that are painful. Culture from 11/2018 showed MRSA.  Denies Fever. \par She also complaints of swelling in her right leg. Not tender and not erythematous and negative Gong;s sign. \par Her diarrhea with Revlimid is ongoing and Imodium and Lomotil helps but not everyday. \par She is on 60,000 units of procrit weekly and Revlimid 5 mg 2 weeks on 1 week off. \par \par 4/23/19\par Pt is here for follow up.\par She feels well.\par The nasal sores have improved with bactroban\par The abscess on left shoulder has resolved.\par However she has a new one on the middle of her back.\par No fever.\par Pt has been on procrit 19755 units weekly, however she missed a dose in between.\par \par 5/8/19\par pt is here for follow up.\par no new complaints.\par feels well.\par Her skin abscess has resolved.\par she has been unable to go to ID, as she could not get an appt.\par No bleeding.\par She is compliant with revlimid.\par \par 6/6/19\par Pt is here for follow up.\par no new complaints \par Feels well.\par Is on week 2 of her Revlimid cycle. \par No transfusions since last visit\par \par 7/17/19\par Pt is here for follow up.\par C/O fatigue.\par Was away for a few days two weeks ago, but missed treatment with procrit for 2 weeks.\par Is complaint with Revlimid 2 weeks on 1 week off.\par Diarrhea is a little improved.\par \par \par 8/14/19\par Patient here for follow up visit, feeling well.  Although she is complaining of some pain at the left scapula area recently.  Patient denies cough, shortness of breath, denies fever, denies other bone pain.  She is off Revlimid  this week, due to restart on Monday.  She is scheduled for Procrit and Vitamin B12 injection today.  She plans to leave the country tomorrow to visit her mother who is ill.  She will return in about 10 days.\par \par 9/11/19\par Pt is here for follow up.\par She was away for 3 weeks, out of which she took procrit for 2 weeks in [place].\par She missed last week's dose.\par She had CBCs in [place] which showed Hgb of 8.9\par She feels well.\par She still getting diarrhea.\par She has been using lomotil with very minimal relief.\par She started a new cycle of Revlimid 4 days ago.\par \par 10/3/19\par Patient here for follow up visit, feeling fairly well.   Patient denies cough, shortness of breath, denies fever, denies other bone pain.  She remains on Revlimid.  She is scheduled for Procrit and Vitamin B12 injection today.\par \par 10/24/19\par Pt is here for follow up.\par Feels well.\par No SOB, fatigue.\par Compliant with procrit and Revl;imid.\par Remains on ASa.\par Diarrhea is unchanged.\par Pt takes imodium as needed.\par \par 11/14/19\par Pt is here for follow up.\par No new complaints.\par Starts a new cycle of Revlimid today.\par Diarrhea is same as before, no rectal bleeding.\par No fever.\par \par 12/5/19\par Pt is here for follow up.\par No new complaints.\par Denies feeling weaker than usual.\par No fever, SOB.\par No change in frequency of diarrhea.\par No pain.\par Will start next cycle of Revlimid in 3 days.\par \par \par 1/9/20\par Pt is here for unscheduled visit.\par C/o feeling very fatigued over the last 2 days, feeling wobbly.\par C/o pain in her back going all the way down from her neck to her hip mostly on the right side.\par No dysuria\par No worsening of her mild cough, no expectoration.\par No abdominal pain.\par No headache.\par No sick contacts.\par Might have had a fever yesterday, did not check her temperature.\par No nausea or vomiting. [de-identified] : \par

## 2020-01-09 NOTE — ED PROVIDER NOTE - CLINICAL SUMMARY MEDICAL DECISION MAKING FREE TEXT BOX
73F p/w fever cough. noted to have coarse rhonchi in the right post lung field. vs noted to be febrile and tachy. code sepsis called, given ns bolus 30cc/kg, broad spectrum abx, bccx sent. cxr confirmed the rml pna. review of labs noted to have elevated lactate. Hb noted be 2.1- likely for MDS. Admitted to medicine. pt's daughter at bedside.

## 2020-01-10 DIAGNOSIS — Z02.9 ENCOUNTER FOR ADMINISTRATIVE EXAMINATIONS, UNSPECIFIED: ICD-10-CM

## 2020-01-10 LAB
ALBUMIN SERPL ELPH-MCNC: 3.3 G/DL — LOW (ref 3.5–5.2)
ALP SERPL-CCNC: 67 U/L — SIGNIFICANT CHANGE UP (ref 30–115)
ALT FLD-CCNC: 16 U/L — SIGNIFICANT CHANGE UP (ref 0–41)
ANION GAP SERPL CALC-SCNC: 12 MMOL/L — SIGNIFICANT CHANGE UP (ref 7–14)
APPEARANCE UR: CLEAR — SIGNIFICANT CHANGE UP
AST SERPL-CCNC: 10 U/L — SIGNIFICANT CHANGE UP (ref 0–41)
BACTERIA # UR AUTO: NEGATIVE — SIGNIFICANT CHANGE UP
BASOPHILS # BLD AUTO: 0.03 K/UL — SIGNIFICANT CHANGE UP (ref 0–0.2)
BASOPHILS NFR BLD AUTO: 0.5 % — SIGNIFICANT CHANGE UP (ref 0–1)
BILIRUB SERPL-MCNC: 0.5 MG/DL — SIGNIFICANT CHANGE UP (ref 0.2–1.2)
BILIRUB UR-MCNC: NEGATIVE — SIGNIFICANT CHANGE UP
BLD GP AB SCN SERPL QL: SIGNIFICANT CHANGE UP
BUN SERPL-MCNC: 21 MG/DL — HIGH (ref 10–20)
CALCIUM SERPL-MCNC: 8.6 MG/DL — SIGNIFICANT CHANGE UP (ref 8.5–10.1)
CHLORIDE SERPL-SCNC: 108 MMOL/L — SIGNIFICANT CHANGE UP (ref 98–110)
CHOLEST SERPL-MCNC: 106 MG/DL — SIGNIFICANT CHANGE UP (ref 100–200)
CO2 SERPL-SCNC: 17 MMOL/L — SIGNIFICANT CHANGE UP (ref 17–32)
COLOR SPEC: SIGNIFICANT CHANGE UP
CREAT SERPL-MCNC: 1.7 MG/DL — HIGH (ref 0.7–1.5)
DIFF PNL FLD: NEGATIVE — SIGNIFICANT CHANGE UP
EOSINOPHIL # BLD AUTO: 0.26 K/UL — SIGNIFICANT CHANGE UP (ref 0–0.7)
EOSINOPHIL NFR BLD AUTO: 4.2 % — SIGNIFICANT CHANGE UP (ref 0–8)
EPI CELLS # UR: 1 /HPF — SIGNIFICANT CHANGE UP (ref 0–5)
ESTIMATED AVERAGE GLUCOSE: 163 MG/DL — HIGH (ref 68–114)
FERRITIN SERPL-MCNC: 985 NG/ML — HIGH (ref 15–150)
FOLATE SERPL-MCNC: 15.8 NG/ML — SIGNIFICANT CHANGE UP
GLUCOSE BLDC GLUCOMTR-MCNC: 178 MG/DL — HIGH (ref 70–99)
GLUCOSE BLDC GLUCOMTR-MCNC: 216 MG/DL — HIGH (ref 70–99)
GLUCOSE BLDC GLUCOMTR-MCNC: 219 MG/DL — HIGH (ref 70–99)
GLUCOSE BLDC GLUCOMTR-MCNC: 297 MG/DL — HIGH (ref 70–99)
GLUCOSE SERPL-MCNC: 148 MG/DL — HIGH (ref 70–99)
GLUCOSE UR QL: NEGATIVE — SIGNIFICANT CHANGE UP
GRAN CASTS # UR COMP ASSIST: SIGNIFICANT CHANGE UP /LPF
HBA1C BLD-MCNC: 7.3 % — HIGH (ref 4–5.6)
HCT VFR BLD CALC: 19 % — LOW (ref 37–47)
HDLC SERPL-MCNC: 54 MG/DL — SIGNIFICANT CHANGE UP
HGB BLD-MCNC: 6 G/DL — CRITICAL LOW (ref 12–16)
HYALINE CASTS # UR AUTO: 1 /LPF — SIGNIFICANT CHANGE UP (ref 0–7)
IMM GRANULOCYTES NFR BLD AUTO: 0.5 % — HIGH (ref 0.1–0.3)
IRON SATN MFR SERPL: 16 UG/DL — LOW (ref 35–150)
IRON SATN MFR SERPL: 9 % — LOW (ref 15–50)
KETONES UR-MCNC: NEGATIVE — SIGNIFICANT CHANGE UP
LACTATE SERPL-SCNC: 1.1 MMOL/L — SIGNIFICANT CHANGE UP (ref 0.7–2)
LEGIONELLA AG UR QL: NEGATIVE — SIGNIFICANT CHANGE UP
LEUKOCYTE ESTERASE UR-ACNC: NEGATIVE — SIGNIFICANT CHANGE UP
LIPID PNL WITH DIRECT LDL SERPL: 32 MG/DL — SIGNIFICANT CHANGE UP (ref 4–129)
LYMPHOCYTES # BLD AUTO: 0.7 K/UL — LOW (ref 1.2–3.4)
LYMPHOCYTES # BLD AUTO: 11.3 % — LOW (ref 20.5–51.1)
MAGNESIUM SERPL-MCNC: 1.6 MG/DL — LOW (ref 1.8–2.4)
MCHC RBC-ENTMCNC: 31.6 G/DL — LOW (ref 32–37)
MCHC RBC-ENTMCNC: 35.7 PG — HIGH (ref 27–31)
MCV RBC AUTO: 113.1 FL — HIGH (ref 81–99)
MONOCYTES # BLD AUTO: 0.73 K/UL — HIGH (ref 0.1–0.6)
MONOCYTES NFR BLD AUTO: 11.8 % — HIGH (ref 1.7–9.3)
NEUTROPHILS # BLD AUTO: 4.44 K/UL — SIGNIFICANT CHANGE UP (ref 1.4–6.5)
NEUTROPHILS NFR BLD AUTO: 71.7 % — SIGNIFICANT CHANGE UP (ref 42.2–75.2)
NITRITE UR-MCNC: NEGATIVE — SIGNIFICANT CHANGE UP
NRBC # BLD: 0 /100 WBCS — SIGNIFICANT CHANGE UP (ref 0–0)
PH UR: 6 — SIGNIFICANT CHANGE UP (ref 5–8)
PLATELET # BLD AUTO: 293 K/UL — SIGNIFICANT CHANGE UP (ref 130–400)
POTASSIUM SERPL-MCNC: 3.9 MMOL/L — SIGNIFICANT CHANGE UP (ref 3.5–5)
POTASSIUM SERPL-SCNC: 3.9 MMOL/L — SIGNIFICANT CHANGE UP (ref 3.5–5)
PROT SERPL-MCNC: 5.6 G/DL — LOW (ref 6–8)
PROT UR-MCNC: ABNORMAL
RBC # BLD: 1.68 M/UL — LOW (ref 4.2–5.4)
RBC # FLD: 16.5 % — HIGH (ref 11.5–14.5)
RBC CASTS # UR COMP ASSIST: 3 /HPF — SIGNIFICANT CHANGE UP (ref 0–4)
SODIUM SERPL-SCNC: 137 MMOL/L — SIGNIFICANT CHANGE UP (ref 135–146)
SP GR SPEC: 1.01 — SIGNIFICANT CHANGE UP (ref 1.01–1.02)
TIBC SERPL-MCNC: 176 UG/DL — LOW (ref 220–430)
TOTAL CHOLESTEROL/HDL RATIO MEASUREMENT: 2 RATIO — LOW (ref 4–5.5)
TRIGL SERPL-MCNC: 106 MG/DL — SIGNIFICANT CHANGE UP (ref 10–149)
TSH SERPL-MCNC: 2.12 UIU/ML — SIGNIFICANT CHANGE UP (ref 0.27–4.2)
UIBC SERPL-MCNC: 160 UG/DL — SIGNIFICANT CHANGE UP (ref 110–370)
UROBILINOGEN FLD QL: SIGNIFICANT CHANGE UP
VIT B12 SERPL-MCNC: 419 PG/ML — SIGNIFICANT CHANGE UP (ref 232–1245)
WBC # BLD: 6.19 K/UL — SIGNIFICANT CHANGE UP (ref 4.8–10.8)
WBC # FLD AUTO: 6.19 K/UL — SIGNIFICANT CHANGE UP (ref 4.8–10.8)
WBC UR QL: 1 /HPF — SIGNIFICANT CHANGE UP (ref 0–5)

## 2020-01-10 PROCEDURE — 99223 1ST HOSP IP/OBS HIGH 75: CPT | Mod: AI

## 2020-01-10 PROCEDURE — 99222 1ST HOSP IP/OBS MODERATE 55: CPT

## 2020-01-10 PROCEDURE — 76770 US EXAM ABDO BACK WALL COMP: CPT | Mod: 26

## 2020-01-10 RX ORDER — VANCOMYCIN HCL 1 G
1000 VIAL (EA) INTRAVENOUS EVERY 24 HOURS
Refills: 0 | Status: DISCONTINUED | OUTPATIENT
Start: 2020-01-10 | End: 2020-01-10

## 2020-01-10 RX ORDER — AZITHROMYCIN 500 MG/1
500 TABLET, FILM COATED ORAL ONCE
Refills: 0 | Status: COMPLETED | OUTPATIENT
Start: 2020-01-10 | End: 2020-01-10

## 2020-01-10 RX ORDER — ACETAMINOPHEN 500 MG
650 TABLET ORAL EVERY 6 HOURS
Refills: 0 | Status: DISCONTINUED | OUTPATIENT
Start: 2020-01-10 | End: 2020-01-13

## 2020-01-10 RX ORDER — CEFEPIME 1 G/1
INJECTION, POWDER, FOR SOLUTION INTRAMUSCULAR; INTRAVENOUS
Refills: 0 | Status: DISCONTINUED | OUTPATIENT
Start: 2020-01-10 | End: 2020-01-13

## 2020-01-10 RX ORDER — ASPIRIN/CALCIUM CARB/MAGNESIUM 324 MG
81 TABLET ORAL DAILY
Refills: 0 | Status: DISCONTINUED | OUTPATIENT
Start: 2020-01-10 | End: 2020-01-13

## 2020-01-10 RX ORDER — SODIUM CHLORIDE 9 MG/ML
1000 INJECTION, SOLUTION INTRAVENOUS
Refills: 0 | Status: DISCONTINUED | OUTPATIENT
Start: 2020-01-10 | End: 2020-01-13

## 2020-01-10 RX ORDER — CEFEPIME 1 G/1
1000 INJECTION, POWDER, FOR SOLUTION INTRAMUSCULAR; INTRAVENOUS ONCE
Refills: 0 | Status: COMPLETED | OUTPATIENT
Start: 2020-01-10 | End: 2020-01-10

## 2020-01-10 RX ORDER — HEPARIN SODIUM 5000 [USP'U]/ML
5000 INJECTION INTRAVENOUS; SUBCUTANEOUS EVERY 12 HOURS
Refills: 0 | Status: DISCONTINUED | OUTPATIENT
Start: 2020-01-10 | End: 2020-01-13

## 2020-01-10 RX ORDER — LENALIDOMIDE 5 MG/1
5 CAPSULE ORAL DAILY
Refills: 0 | Status: DISCONTINUED | OUTPATIENT
Start: 2020-01-10 | End: 2020-01-13

## 2020-01-10 RX ORDER — PANTOPRAZOLE SODIUM 20 MG/1
40 TABLET, DELAYED RELEASE ORAL
Refills: 0 | Status: DISCONTINUED | OUTPATIENT
Start: 2020-01-10 | End: 2020-01-13

## 2020-01-10 RX ORDER — MAGNESIUM SULFATE 500 MG/ML
2 VIAL (ML) INJECTION ONCE
Refills: 0 | Status: COMPLETED | OUTPATIENT
Start: 2020-01-10 | End: 2020-01-10

## 2020-01-10 RX ORDER — AZITHROMYCIN 500 MG/1
500 TABLET, FILM COATED ORAL EVERY 24 HOURS
Refills: 0 | Status: DISCONTINUED | OUTPATIENT
Start: 2020-01-11 | End: 2020-01-13

## 2020-01-10 RX ORDER — CEFEPIME 1 G/1
1000 INJECTION, POWDER, FOR SOLUTION INTRAMUSCULAR; INTRAVENOUS EVERY 24 HOURS
Refills: 0 | Status: DISCONTINUED | OUTPATIENT
Start: 2020-01-11 | End: 2020-01-13

## 2020-01-10 RX ORDER — CHLORHEXIDINE GLUCONATE 213 G/1000ML
1 SOLUTION TOPICAL
Refills: 0 | Status: DISCONTINUED | OUTPATIENT
Start: 2020-01-10 | End: 2020-01-13

## 2020-01-10 RX ORDER — AZITHROMYCIN 500 MG/1
TABLET, FILM COATED ORAL
Refills: 0 | Status: DISCONTINUED | OUTPATIENT
Start: 2020-01-10 | End: 2020-01-13

## 2020-01-10 RX ADMIN — CEFEPIME 100 MILLIGRAM(S): 1 INJECTION, POWDER, FOR SOLUTION INTRAMUSCULAR; INTRAVENOUS at 04:55

## 2020-01-10 RX ADMIN — CHLORHEXIDINE GLUCONATE 1 APPLICATION(S): 213 SOLUTION TOPICAL at 05:17

## 2020-01-10 RX ADMIN — SODIUM CHLORIDE 100 MILLILITER(S): 9 INJECTION, SOLUTION INTRAVENOUS at 10:01

## 2020-01-10 RX ADMIN — Medication 50 GRAM(S): at 07:52

## 2020-01-10 RX ADMIN — Medication 650 MILLIGRAM(S): at 07:45

## 2020-01-10 RX ADMIN — Medication 81 MILLIGRAM(S): at 11:17

## 2020-01-10 RX ADMIN — AZITHROMYCIN 255 MILLIGRAM(S): 500 TABLET, FILM COATED ORAL at 15:37

## 2020-01-10 RX ADMIN — SODIUM CHLORIDE 50 MILLILITER(S): 9 INJECTION, SOLUTION INTRAVENOUS at 07:45

## 2020-01-10 NOTE — CHART NOTE - NSCHARTNOTEFT_GEN_A_CORE
Patient refused RVP swab at this time, had flu swab earlier this admission and does not wish to go through the same swab again. Requesting to defer till tomorrow.

## 2020-01-10 NOTE — H&P ADULT - HISTORY OF PRESENT ILLNESS
74 years old female known to have Myelodysplastic syndrome ( On Revlimid 2 weeks on and 1 week off, and procrit injections once a week, last injection this morning and monthly Vitamin b12 injections), DM,   presents to the ED with chest pain, dry cough, fever x 5 days  As per pt, she has been having right posterior chest pain x 5 days. It has been gradually progressing, non radiating, sharp pain, 7-8/10 on intensity, no relieving or aggravating factor. Also endorses dry cough and fever at home. Tmax was 100.2. Also had chilld and profuse sweating. Pt states she lost 4 pounds in last 1 week due to decreased appetite. She went to to Dr Wade today for these complaints and was sent to ED for workup.  Pt denies any sick contact, recent travel, abdominal pain, nausea, vomiting, dysuria. Pt ambulates without any assistive device at home.  Patient did not require PRBC transfusions for many years, then missed several doses of procrit in September 2019 and subsequently required transfusions.    In the ED, CXR showed right lung field consolidation. Pt had Tmax of 100.5. Lab workup is significant for macrocytic + iron def anemia, lactic acidosis and acute kidney injury. RVP negative 74 years old female known to have Myelodysplastic syndrome ( On Revlimid 2 weeks on and 1 week off, and procrit injections once a week, last injection this morning and monthly Vitamin b12 injections), DM, presents to the ED with chest pain, dry cough, fever x 5 days  As per pt, she has been having right posterior chest pain x 5 days. It has been gradually progressing, non radiating, sharp pain, 7-8/10 on intensity, no relieving or aggravating factor. Also endorses dry cough and fever at home. Tmax was 100.2. Also had chilld and profuse sweating. Pt states she lost 4 pounds in last 1 week due to decreased appetite. She went to to Dr Wade today for these complaints and was sent to ED for workup.  Pt denies any sick contact, recent travel, abdominal pain, nausea, vomiting, dysuria. Pt ambulates without any assistive device at home.  Patient did not require PRBC transfusions for many years, then missed several doses of procrit in September 2019 and subsequently required transfusions.    In the ED, CXR showed right lung field consolidation. Pt had Tmax of 100.5. Lab workup is significant for macrocytic + iron def anemia, lactic acidosis and acute kidney injury. RVP negative

## 2020-01-10 NOTE — CONSULT NOTE ADULT - SUBJECTIVE AND OBJECTIVE BOX
· Subjective and Objective: 	  SIMONA KUHN  74y, Female  Allergy: No Known Allergies      CHIEF COMPLAINT:     HPI:  74 years old female known to have Myelodysplastic syndrome ( On Revlimid 2 weeks on and 1 week off, and procrit injections once a week, last injection this morning and monthly Vitamin b12 injections), DM, presents to the ED with chest pain, dry cough, fever x 5 days  As per pt, she has been having right posterior chest pain x 5 days. It has been gradually progressing, non radiating, sharp pain, 7-8/10 on intensity, no relieving or aggravating factor. Also endorses dry cough and fever at home. Tmax was 100.2. Also had chilld and profuse sweating. Pt states she lost 4 pounds in last 1 week due to decreased appetite. She went to to Dr Wade today for these complaints and was sent to ED for workup.  Pt denies any sick contact, recent travel, abdominal pain, nausea, vomiting, dysuria. Pt ambulates without any assistive device at home.  Patient did not require PRBC transfusions for many years, then missed several doses of procrit in 2019 and subsequently required transfusions.    In the ED, CXR showed right lung field consolidation. Pt had Tmax of 100.5. Lab workup is significant for macrocytic + iron def anemia, lactic acidosis and acute kidney injury. RVP negative (10 Henrik 2020 01:01)    FAMILY HISTORY:    PAST MEDICAL & SURGICAL HISTORY:  MDS (myelodysplastic syndrome)  DM (diabetes mellitus)  No significant past surgical history      SOCIAL HISTORY    Substance Use ( x ) never used  (  ) IVDU (  ) Other:  Tobacco Usage:  (  x ) never smoked   (   ) former smoker   (   ) current smoker   Alcohol Usage: (   ) social  (   ) daily use (  x ) denies         ROS  General: Denies rigors, nightsweats  HEENT: Denies headache, rhinorrhea, sore throat, eye pain  CV: Denies CP, palpitations  PULM: Denies wheezing, hemoptysis, sob, complains dry cough   GI: Denies hematemesis, hematochezia, melena  : Denies discharge, hematuria  MSK: Denies arthralgias, myalgias, complains of back pain right back  SKIN: Denies rash, lesions  NEURO: Denies paresthesias, weakness  PSYCH: Denies depression, anxiety    VITALS:  T(F): 99.8, Max: 101.8 (01-10-20 @ 07:37)  HR: 102  BP: 125/57  RR: 18Vital Signs Last 24 Hrs  T(C): 37.7 (10 Henrik 2020 10:02), Max: 38.8 (10 Henrik 2020 07:37)  T(F): 99.8 (10 Henrik 2020 10:02), Max: 101.8 (10 Henrik 2020 07:37)  HR: 102 (10 Henrik 2020 07:37) (102 - 105)  BP: 125/57 (10 Henrik 2020 07:37) (114/55 - 137/61)  BP(mean): --  RR: 18 (10 Henrik 2020 07:37) (16 - 22)  SpO2: 97% (10 Henrik 2020 07:37) (96% - 97%)    PHYSICAL EXAM:  Gen: NAD, resting in bed  HEENT: Normocephalic, atraumatic  Neck: supple, no lymphadenopathy  CV: Regular rate & regular rhythm  Lungs: decreased BS at bases, ronchi herd in right lobes mild crackles herd in bases.  BACK: no para spinal tenderness, left of back skin healing lesion  Abdomen: Soft, BS present  Ext: Warm, well perfused  Neuro: non focal, awake  Skin: no rash, no erythema    TESTS & MEASUREMENTS:                        6.0    6.19  )-----------( 293      ( 10 Henrik 2020 05:38 )             19.0     01-10    137  |  108  |  21<H>  ----------------------------<  148<H>  3.9   |  17  |  1.7<H>    Ca    8.6      10 Henrik 2020 05:38  Mg     1.6     01-10    TPro  5.6<L>  /  Alb  3.3<L>  /  TBili  0.5  /  DBili  x   /  AST  10  /  ALT  16  /  AlkPhos  67  01-10    eGFR if Non African American: 29 mL/min/1.73M2 (01-10-20 @ 05:38)  eGFR if African American: 34 mL/min/1.73M2 (01-10-20 @ 05:38)  eGFR if Non African American: 32 mL/min/1.73M2 (20 @ 20:15)  eGFR if African American: 37 mL/min/1.73M2 (20 @ 20:15)    LIVER FUNCTIONS - ( 10 Henrik 2020 05:38 )  Alb: 3.3 g/dL / Pro: 5.6 g/dL / ALK PHOS: 67 U/L / ALT: 16 U/L / AST: 10 U/L / GGT: x           Urinalysis Basic - ( 10 Henrik 2020 04:55 )    Color: Light Yellow / Appearance: Clear / S.011 / pH: x  Gluc: x / Ketone: Negative  / Bili: Negative / Urobili: <2 mg/dL   Blood: x / Protein: 30 mg/dL / Nitrite: Negative   Leuk Esterase: Negative / RBC: 3 /HPF / WBC 1 /HPF   Sq Epi: x / Non Sq Epi: 1 /HPF / Bacteria: Negative          Lactate, Blood: 1.1 mmol/L (01-10-20 @ 05:38)  Lactate, Blood: 2.4 mmol/L (20 @ 21:40)  Lactate, Blood: 2.1 mmol/L (20 @ 20:15)  Blood Gas Venous - Lactate: 2.2 mmoL/L (20 @ 20:01)      INFECTIOUS DISEASES TESTING      RADIOLOGY & ADDITIONAL TESTS:  I have personally reviewed the last Chest xray  CXR  Xray Chest 2 Views PA/Lat:   EXAM:  XR CHEST PA LAT 2V            PROCEDURE DATE:  2020            INTERPRETATION:  Clinical History / Reason for exam: Sepsis    Comparison : Chest radiograph 2020.    Technique/Positioning: PA and lateral views of the chest.    Findings:    Support devices: None.    Cardiac/mediastinum/hilum: Unchanged    Lung parenchyma/Pleura: Unchanged right upper lobe opacity. No pneumothorax.    Skeleton/soft tissues: Unchanged    Impression:      Unchanged right upper lobe opacity.                      ELLIOT LANDAU M.D., ATTENDING RADIOLOGIST  This document has been electronically signed. Henrik 10 2020  7:13AM             (20 @ 20:35)  Xray Chest 2 Views PA/Lat:   EXAM:  XR CHEST PA LAT 2V            PROCEDURE DATE:  2020            INTERPRETATION:  CLINICAL HISTORY: cough. Back pain and cough for one to 2 weeks.    COMPARISON: 2018.    TECHNIQUE: Portable frontal chest radiograph. Adequate positioning.    FINDINGS:    Support devices: None.    Cardiac/mediastinum/hilum: Unremarkable.    Lung parenchyma/Pleura: Focal consolidation in the superior segment of the right lower lobe. Trace right parapneumonic effusion. No pneumothorax.    Skeleton/soft tissues: Unremarkable.      IMPRESSION:      Focal consolidation in the superior segment of the right lower lobe.     Trace right parapneumonic effusion.    Spoke with MIGUEL WADE MD               on 2020 2:09 PM with readback.              MARIBELL PACKER M.D., ATTENDING RADIOLOGIST  This document has been electronically signed. 2020  2:11PM             (20 @ 12:32)      CT      CARDIOLOGY TESTING  12 Lead ECG:   Ventricular Rate 112 BPM    Atrial Rate 112 BPM    P-R Interval 138 ms    QRS Duration 80 ms    Q-T Interval 334 ms    QTC Calculation(Bezet) 455 ms    P Axis 66 degrees    R Axis 49 degrees    T Axis 76 degrees    Diagnosis Line Sinus tachycardia  Nonspecific ST and T wave abnormality  Abnormal ECG    Confirmed by Edmond Maddox ([id number]) on 1/10/2020 5:52:11 AM (20 @ 20:38)      MEDICATIONS  aspirin  chewable 81  cefepime   IVPB   chlorhexidine 4% Liquid 1  heparin  Injectable 5000  lactated ringers. 1000  lenalidomide 5  pantoprazole    Tablet 40  vancomycin  IVPB 1000      ANTIBIOTICS:  cefepime   IVPB      vancomycin  IVPB 1000 milliGRAM(s) IV Intermittent every 24 hours · Subjective and Objective: 	  SIMONA KUHN  74y, Female  Allergy: No Known Allergies      CHIEF COMPLAINT:     HPI:  74 years old female known to have Myelodysplastic syndrome ( On Revlimid 2 weeks on and 1 week off, and procrit injections once a week, last injection this morning and monthly Vitamin b12 injections), DM, presents to the ED with chest pain, dry cough, fever x 5 days  As per pt, she has been having right posterior chest pain x 5 days. It has been gradually progressing, non radiating, sharp pain, 7-8/10 on intensity, no relieving or aggravating factor. Also endorses dry cough and fever at home. Tmax was 100.2. Also had chilld and profuse sweating. Pt states she lost 4 pounds in last 1 week due to decreased appetite. She went to to Dr Wade today for these complaints and was sent to ED for workup.  Pt denies any sick contact, recent travel, abdominal pain, nausea, vomiting, dysuria. Pt ambulates without any assistive device at home.  Patient did not require PRBC transfusions for many years, then missed several doses of procrit in 2019 and subsequently required transfusions.    In the ED, CXR showed right lung field consolidation. Pt had Tmax of 100.5. Lab workup is significant for macrocytic + iron def anemia, lactic acidosis and acute kidney injury. RVP negative (10 Henrik 2020 01:01)    FAMILY HISTORY:  non-contributory   PAST MEDICAL & SURGICAL HISTORY:  MDS (myelodysplastic syndrome)  DM (diabetes mellitus)  No significant past surgical history      SOCIAL HISTORY    Substance Use ( x ) never used  (  ) IVDU (  ) Other:  Tobacco Usage:  (  x ) never smoked   (   ) former smoker   (   ) current smoker   Alcohol Usage: (   ) social  (   ) daily use (  x ) denies         ROS  General: Denies rigors, nightsweats  HEENT: Denies headache, rhinorrhea, sore throat, eye pain  CV: Denies CP, palpitations  PULM: Denies wheezing, hemoptysis, sob, complains dry cough   GI: Denies hematemesis, hematochezia, melena  : Denies discharge, hematuria  MSK: Denies arthralgias, myalgias, complains of back pain right back  SKIN: Denies rash, lesions  NEURO: Denies paresthesias, weakness  PSYCH: Denies depression, anxiety    VITALS:  T(F): 99.8, Max: 101.8 (01-10-20 @ 07:37)  HR: 102  BP: 125/57  RR: 18Vital Signs Last 24 Hrs  T(C): 37.7 (10 Henrik 2020 10:02), Max: 38.8 (10 Henrik 2020 07:37)  T(F): 99.8 (10 Henrik 2020 10:02), Max: 101.8 (10 Henrik 2020 07:37)  HR: 102 (10 Henrik 2020 07:37) (102 - 105)  BP: 125/57 (10 Henrik 2020 07:37) (114/55 - 137/61)  BP(mean): --  RR: 18 (10 Henrik 2020 07:37) (16 - 22)  SpO2: 97% (10 Henrik 2020 07:37) (96% - 97%)    PHYSICAL EXAM:  Gen: NAD, resting in bed  HEENT: Normocephalic, atraumatic  Neck: supple, no lymphadenopathy  CV: Regular rate & regular rhythm  Lungs: decreased BS at bases, ronchi herd in right lobes mild crackles herd in bases.  BACK: no para spinal tenderness, left of back skin healing lesion  Abdomen: Soft, BS present  Ext: Warm, well perfused  Neuro: non focal, awake  Skin: no rash, no erythema    TESTS & MEASUREMENTS:                        6.0    6.19  )-----------( 293      ( 10 Henrik 2020 05:38 )             19.0     01-10    137  |  108  |  21<H>  ----------------------------<  148<H>  3.9   |  17  |  1.7<H>    Ca    8.6      10 Henrik 2020 05:38  Mg     1.6     01-10    TPro  5.6<L>  /  Alb  3.3<L>  /  TBili  0.5  /  DBili  x   /  AST  10  /  ALT  16  /  AlkPhos  67  01-10    eGFR if Non African American: 29 mL/min/1.73M2 (01-10-20 @ 05:38)  eGFR if African American: 34 mL/min/1.73M2 (01-10-20 @ 05:38)  eGFR if Non African American: 32 mL/min/1.73M2 (20 @ 20:15)  eGFR if African American: 37 mL/min/1.73M2 (20 @ 20:15)    LIVER FUNCTIONS - ( 10 Henrik 2020 05:38 )  Alb: 3.3 g/dL / Pro: 5.6 g/dL / ALK PHOS: 67 U/L / ALT: 16 U/L / AST: 10 U/L / GGT: x           Urinalysis Basic - ( 10 Henrik 2020 04:55 )    Color: Light Yellow / Appearance: Clear / S.011 / pH: x  Gluc: x / Ketone: Negative  / Bili: Negative / Urobili: <2 mg/dL   Blood: x / Protein: 30 mg/dL / Nitrite: Negative   Leuk Esterase: Negative / RBC: 3 /HPF / WBC 1 /HPF   Sq Epi: x / Non Sq Epi: 1 /HPF / Bacteria: Negative          Lactate, Blood: 1.1 mmol/L (01-10-20 @ 05:38)  Lactate, Blood: 2.4 mmol/L (20 @ 21:40)  Lactate, Blood: 2.1 mmol/L (20 @ 20:15)  Blood Gas Venous - Lactate: 2.2 mmoL/L (20 @ 20:01)      INFECTIOUS DISEASES TESTING      RADIOLOGY & ADDITIONAL TESTS:  I have personally reviewed the last Chest xray  CXR  Xray Chest 2 Views PA/Lat:   EXAM:  XR CHEST PA LAT 2V            PROCEDURE DATE:  2020            INTERPRETATION:  Clinical History / Reason for exam: Sepsis    Comparison : Chest radiograph 2020.    Technique/Positioning: PA and lateral views of the chest.    Findings:    Support devices: None.    Cardiac/mediastinum/hilum: Unchanged    Lung parenchyma/Pleura: Unchanged right upper lobe opacity. No pneumothorax.    Skeleton/soft tissues: Unchanged    Impression:      Unchanged right upper lobe opacity.                      ELLIOT LANDAU M.D., ATTENDING RADIOLOGIST  This document has been electronically signed. Henrik 10 2020  7:13AM             (20 @ 20:35)  Xray Chest 2 Views PA/Lat:   EXAM:  XR CHEST PA LAT 2V            PROCEDURE DATE:  2020            INTERPRETATION:  CLINICAL HISTORY: cough. Back pain and cough for one to 2 weeks.    COMPARISON: 2018.    TECHNIQUE: Portable frontal chest radiograph. Adequate positioning.    FINDINGS:    Support devices: None.    Cardiac/mediastinum/hilum: Unremarkable.    Lung parenchyma/Pleura: Focal consolidation in the superior segment of the right lower lobe. Trace right parapneumonic effusion. No pneumothorax.    Skeleton/soft tissues: Unremarkable.      IMPRESSION:      Focal consolidation in the superior segment of the right lower lobe.     Trace right parapneumonic effusion.    Spoke with MIGUEL WADE MD               on 2020 2:09 PM with readback.              MARIBELL PACKER M.D., ATTENDING RADIOLOGIST  This document has been electronically signed. 2020  2:11PM             (20 @ 12:32)      CT      CARDIOLOGY TESTING  12 Lead ECG:   Ventricular Rate 112 BPM    Atrial Rate 112 BPM    P-R Interval 138 ms    QRS Duration 80 ms    Q-T Interval 334 ms    QTC Calculation(Bezet) 455 ms    P Axis 66 degrees    R Axis 49 degrees    T Axis 76 degrees    Diagnosis Line Sinus tachycardia  Nonspecific ST and T wave abnormality  Abnormal ECG    Confirmed by Edmond Maddox ([id number]) on 1/10/2020 5:52:11 AM (20 @ 20:38)      MEDICATIONS  aspirin  chewable 81  cefepime   IVPB   chlorhexidine 4% Liquid 1  heparin  Injectable 5000  lactated ringers. 1000  lenalidomide 5  pantoprazole    Tablet 40  vancomycin  IVPB 1000      ANTIBIOTICS:  cefepime   IVPB      vancomycin  IVPB 1000 milliGRAM(s) IV Intermittent every 24 hours

## 2020-01-10 NOTE — CONSULT NOTE ADULT - SUBJECTIVE AND OBJECTIVE BOX
Patient is a 74y old  Female who presents with a chief complaint of     HPI:  74 years old female known to have Myelodysplastic syndrome ( On Revlimid 2 weeks on and 1 week off, and procrit injections once a week, last injection this morning and monthly Vitamin b12 injections), DM, presents to the ED with chest pain, dry cough, fever x 5 days  As per pt, she has been having right posterior chest pain x 5 days. It has been gradually progressing, non radiating, sharp pain, 7-8/10 on intensity, no relieving or aggravating factor. Also endorses dry cough and fever at home. Tmax was 100.2. Also had chilld and profuse sweating. Pt states she lost 4 pounds in last 1 week due to decreased appetite. She went to to Dr Wade today for these complaints and was sent to ED for workup.  Pt denies any sick contact, recent travel, abdominal pain, nausea, vomiting, dysuria. Pt ambulates without any assistive device at home.  Patient did not require PRBC transfusions for many years, then missed several doses of procrit in September 2019 and subsequently required transfusions.    In the ED, CXR showed right lung field consolidation. Pt had Tmax of 100.5. Lab workup is significant for macrocytic + iron def anemia, lactic acidosis and acute kidney injury. RVP negative (10 Henrik 2020 01:01)       ROS:  Negative except for:    PAST MEDICAL & SURGICAL HISTORY:  MDS (myelodysplastic syndrome)  DM (diabetes mellitus)  No significant past surgical history      SOCIAL HISTORY:    FAMILY HISTORY:      MEDICATIONS  (STANDING):  aspirin  chewable 81 milliGRAM(s) Oral daily  cefepime   IVPB      chlorhexidine 4% Liquid 1 Application(s) Topical <User Schedule>  heparin  Injectable 5000 Unit(s) SubCutaneous every 12 hours  lactated ringers. 1000 milliLiter(s) (100 mL/Hr) IV Continuous <Continuous>  lenalidomide 5 milliGRAM(s) Oral daily  pantoprazole    Tablet 40 milliGRAM(s) Oral before breakfast  vancomycin  IVPB 1000 milliGRAM(s) IV Intermittent every 24 hours    MEDICATIONS  (PRN):  acetaminophen   Tablet .. 650 milliGRAM(s) Oral every 6 hours PRN Temp greater or equal to 38C (100.4F)      Weight (kg): 65 (01-09-20 @ 20:01)  Allergies    No Known Allergies    Intolerances        Vital Signs Last 24 Hrs  T(C): 37.7 (10 Henrik 2020 10:02), Max: 38.8 (10 Henrik 2020 07:37)  T(F): 99.8 (10 Henrik 2020 10:02), Max: 101.8 (10 Henrik 2020 07:37)  HR: 102 (10 Henrik 2020 07:37) (102 - 105)  BP: 125/57 (10 Henrik 2020 07:37) (114/55 - 137/61)  BP(mean): --  RR: 18 (10 Henrik 2020 07:37) (16 - 22)  SpO2: 97% (10 Henrik 2020 07:37) (96% - 97%)    PHYSICAL EXAM  General: adult in NAD  HEENT: clear oropharynx, anicteric sclera, pink conjunctiva  Neck: supple  CV: normal S1/S2 with no murmur rubs or gallops  Lungs: positive air movement b/l ant lungs,clear to auscultation, no wheezes, no rales  Abdomen: soft non-tender non-distended, no hepatosplenomegaly  Ext: no clubbing cyanosis or edema  Skin: no rashes and no petechiae  Neuro: alert and oriented X 4, no focal deficits      LABS:                          6.0    6.19  )-----------( 293      ( 10 Henrik 2020 05:38 )             19.0         Mean Cell Volume : 113.1 fL  Mean Cell Hemoglobin : 35.7 pg  Mean Cell Hemoglobin Concentration : 31.6 g/dL  Auto Neutrophil # : 4.44 K/uL  Auto Lymphocyte # : 0.70 K/uL  Auto Monocyte # : 0.73 K/uL  Auto Eosinophil # : 0.26 K/uL  Auto Basophil # : 0.03 K/uL  Auto Neutrophil % : 71.7 %  Auto Lymphocyte % : 11.3 %  Auto Monocyte % : 11.8 %  Auto Eosinophil % : 4.2 %  Auto Basophil % : 0.5 %      Serial CBC's  01-10 @ 05:38  Hct-19.0 / Hgb-6.0 / Plat-293 / RBC-1.68 / WBC-6.19  Serial CBC's  01-09 @ 20:15  Hct-21.8 / Hgb-7.1 / Plat-367 / RBC-1.92 / WBC-9.63      01-10    137  |  108  |  21<H>  ----------------------------<  148<H>  3.9   |  17  |  1.7<H>    Ca    8.6      10 Henrik 2020 05:38  Mg     1.6     01-10    TPro  5.6<L>  /  Alb  3.3<L>  /  TBili  0.5  /  DBili  x   /  AST  10  /  ALT  16  /  AlkPhos  67  01-10      PT/INR - ( 09 Jan 2020 20:15 )   PT: 14.60 sec;   INR: 1.27 ratio         PTT - ( 09 Jan 2020 20:15 )  PTT:18.5 sec    Iron - Total Binding Capacity.: 176 ug/dL (01-10 @ 05:38)              BLOOD SMEAR INTERPRETATION:       RADIOLOGY & ADDITIONAL STUDIES: Patient is a 74y old  Female who presents with a chief complaint of     HPI:  74 years old female known to have Myelodysplastic syndrome ( On Revlimid 2 weeks on and 1 week off, and procrit injections once a week, last injection on 1/9/2020 and monthly Vitamin b12 injections), DM, presents to the ED with chest pain, dry cough, fever x 5 days.     As per pt, she has been having right posterior chest pain x 5 days. It has been gradually progressing, non radiating, sharp pain, 7-8/10 on intensity, no relieving or aggravating factor. Also endorses dry cough and fever at home. Tmax was 104 at home with chills.    Pt denies any sick contact, recent travel, abdominal pain, nausea, vomiting, dysuria. Pt ambulates without any assistive device at home.  Patient did not require PRBC transfusions for many years, then missed several doses of procrit in September 2019 and subsequently required transfusions.    In the ED, CXR showed right lung field consolidation. Pt had Tmax of 100.5. Lab workup is significant for macrocytic + iron def anemia, lactic acidosis and acute kidney injury. RVP negative (10 Henrik 2020 01:01)       ROS:  Negative except for:    PAST MEDICAL & SURGICAL HISTORY:  MDS (myelodysplastic syndrome)  DM (diabetes mellitus)  No significant past surgical history      SOCIAL HISTORY: Negative for smoking, alcohol use, illicit drug use     FAMILY HISTORY: Not significant       MEDICATIONS  (STANDING):  aspirin  chewable 81 milliGRAM(s) Oral daily  cefepime   IVPB      chlorhexidine 4% Liquid 1 Application(s) Topical <User Schedule>  heparin  Injectable 5000 Unit(s) SubCutaneous every 12 hours  lactated ringers. 1000 milliLiter(s) (100 mL/Hr) IV Continuous <Continuous>  lenalidomide 5 milliGRAM(s) Oral daily  pantoprazole    Tablet 40 milliGRAM(s) Oral before breakfast  vancomycin  IVPB 1000 milliGRAM(s) IV Intermittent every 24 hours    MEDICATIONS  (PRN):  acetaminophen   Tablet .. 650 milliGRAM(s) Oral every 6 hours PRN Temp greater or equal to 38C (100.4F)      Weight (kg): 65 (01-09-20 @ 20:01)  Allergies    No Known Allergies    Intolerances        Vital Signs Last 24 Hrs  T(C): 37.7 (10 Henrik 2020 10:02), Max: 38.8 (10 Henrik 2020 07:37)  T(F): 99.8 (10 Henrik 2020 10:02), Max: 101.8 (10 Henrik 2020 07:37)  HR: 102 (10 Henrik 2020 07:37) (102 - 105)  BP: 125/57 (10 Henrik 2020 07:37) (114/55 - 137/61)  BP(mean): --  RR: 18 (10 Henrik 2020 07:37) (16 - 22)  SpO2: 97% (10 Henrik 2020 07:37) (96% - 97%)    PHYSICAL EXAM  General: adult in NAD  HEENT: clear oropharynx  Neck: supple  CV: normal S1/S2   Lungs: b/l breast sounds, , Rt rhonchi, mild wheeze  	  	  Abdomen: soft non-tender non-distended, no hepatosplenomegaly  Ext: no clubbing cyanosis or edema  Skin: no rashes and no petechiae  Neuro: alert and oriented X 4, no focal deficits      LABS:                          6.0    6.19  )-----------( 293      ( 10 Henrik 2020 05:38 )             19.0         Mean Cell Volume : 113.1 fL  Mean Cell Hemoglobin : 35.7 pg  Mean Cell Hemoglobin Concentration : 31.6 g/dL  Auto Neutrophil # : 4.44 K/uL  Auto Lymphocyte # : 0.70 K/uL  Auto Monocyte # : 0.73 K/uL  Auto Eosinophil # : 0.26 K/uL  Auto Basophil # : 0.03 K/uL  Auto Neutrophil % : 71.7 %  Auto Lymphocyte % : 11.3 %  Auto Monocyte % : 11.8 %  Auto Eosinophil % : 4.2 %  Auto Basophil % : 0.5 %      Serial CBC's  01-10 @ 05:38  Hct-19.0 / Hgb-6.0 / Plat-293 / RBC-1.68 / WBC-6.19  Serial CBC's  01-09 @ 20:15  Hct-21.8 / Hgb-7.1 / Plat-367 / RBC-1.92 / WBC-9.63      01-10    137  |  108  |  21<H>  ----------------------------<  148<H>  3.9   |  17  |  1.7<H>    Ca    8.6      10 Henrik 2020 05:38  Mg     1.6     01-10    TPro  5.6<L>  /  Alb  3.3<L>  /  TBili  0.5  /  DBili  x   /  AST  10  /  ALT  16  /  AlkPhos  67  01-10      PT/INR - ( 09 Jan 2020 20:15 )   PT: 14.60 sec;   INR: 1.27 ratio         PTT - ( 09 Jan 2020 20:15 )  PTT:18.5 sec    Iron - Total Binding Capacity.: 176 ug/dL (01-10 @ 05:38)              BLOOD SMEAR INTERPRETATION:       RADIOLOGY & ADDITIONAL STUDIES:

## 2020-01-10 NOTE — CONSULT NOTE ADULT - ATTENDING COMMENTS
She was seen and examined in ED. She states she feels better now. She is on antibiotics and was seen by Dr. Rahman of ED. She had Vanc and Cefepine.    Her baseline Hgb is 7-8.    Impression:  #Pneumonia in a patient with MDS  -continue with antibiotics as per Dr. Rahman    #MDS on with macrocytic anemia   -s/p one untit of prbc  -keep hgb >7 gm/dl  -can give procrit when time she is on 80,000 units weekly last dose was on 1/9 this needs a  dose on 1/16 if still here  -would hold revlamid since in MARCO  -fall in Hgb from baseline maybe due to pneumonia  -iron studies consistent with acute phase reaction due to infection. +/- PRBC use in past leading to high ferritin.     #MARCO suspect due to dehydration and infection  - IVF for 24 hours than re-evalute for need for hydraion    #DVT ppx hep Sc on ASA since on revlamid

## 2020-01-10 NOTE — H&P ADULT - ATTENDING COMMENTS
73 YO F with a PMH of Myelodysplastic syndrome ( On Revlimid and procrit injections), and DM who was sent into the hospital by her PCP with a c/o chest pain for the past x 4 days. Described as sharp, non-radiating, progressively worsening, waxes/wanes, and has no exacerbating/relieving factors. Associated with a non-productive cough, fever, and chills. In the ED, A Chest X-Ray showed right-sided pneumonia, code sepsis called and the pt received IVFs and IV ABXs (Cefe/Azithro). Blood cultures sent. Physical exam reveals pt in NAD. Speaking in full sentences. Tachycardic and febrile (100.5). Rhonchi noted in the right mid-lung field. RRR. ABD is soft and non-tender. LEs without swelling or rashes. Labs and radiology as above. Sepsis due to right-sided community acquired pneumonia, gram negative. Sepsis bundle initiated. C/w IV ABXs (Cefepime/Azithro). IVFs (LR). Trend BMP/CBC/Lactate. Monitor VSs. Supplemental O2 PRN. PRN pain meds. HAGMA from lactic acidosis. Management as above. MARCO? Likely pre-renal. No baseline. Send UA, urine lytes, and serum phos. Monitor urinary out-pt. Trend BMP. Macrocytic anemia. As per pt this is her baseline due to her hx of MDS. Denies any signs of bleeding. C/w home meds. HX of DM. Restart home meds. GI and DVT PPX. Rest as per above note.

## 2020-01-10 NOTE — CONSULT NOTE ADULT - ASSESSMENT
ASSESSMENT  74y F admitted with Sever SEPSIS likely secondary to PNEUMONIA.      MDS (myelodysplastic syndrome) on   DM (diabetes mellitus)    LABs  WBC 6.1<-9  Cr. 1.7  HgB 6.0  Flu A/B RSV Negative   UA Negtive   CXR - Showing right Opacities   CURB65= 2  Blood Cx: Pending            IMPRESSION  Sever Sepsis Possible Secondary to Viral vs. Bacterial PNA  Immunocompromised?  MDS on Revlimid (TNF inhibitor)          RECOMMENDATIONS ASSESSMENT  74y F admitted with Sever SEPSIS likely secondary to PNEUMONIA.      MDS (myelodysplastic syndrome) on   DM (diabetes mellitus)    LABs  WBC 6.1<-9  Cr. 1.7  HgB 6.0  Flu A/B RSV Negative   UA Negtive   CXR - Showing right Opacities   CURB65= 2  Blood Cx: Pending  Legionella Pending  Strep antigen: pending             IMPRESSION  Sever Sepsis Possible Secondary to viral vs. Bacterial PNA   Immunocompromised?  MDS on Revlimid (TNF inhibitor)          RECOMMENDATIONS  Please send: Mycoplasma IgM, RVP, Procalcitonin, Oscar Test  DC Vanc  add Azithromycin   C/w Falgyl and Cefepime ASSESSMENT  74y F MSD on Revlimid, DM admitted with PNA    LABs  WBC 6.1<-9  Cr. 1.7  HgB 6.0  Flu A/B RSV Negative   UA Negtive   CXR - Showing right Opacities   CURB65= 2  Blood Cx: Pending  Legionella Pending  Strep antigen: pending     IMPRESSION  PNA, r/o Atypical Bacterial PNA (r/o mycoplasma) vs GNR  Immunocompromised, MDS on Revlimid (TNF inhibitor)  Drop in hgb likely 2/2 MDS and not a hemolytic anemia 2/2 PNA but on the differential (other labs not c/w hemolysis)  Lactic acidosis  Hyponatremia  Does not meet sepsis criteria on admission, then 1/10 T>101, P>90    RECOMMENDATIONS  Please send: Mycoplasma IgM, RVP, Procalcitonin, Oscar Test  D/C Vanc (doubt MRSA PNA)  add Azithromycin 500mg q24h IV (QTC Calculation(Bezet) 455 ms)  Cont cefepime 1g q12h IV

## 2020-01-10 NOTE — CONSULT NOTE ADULT - ASSESSMENT
74 years old female with Myelodysplastic syndrome ( On Revlimid 2 weeks on and 1 week off, and procrit injections once a week, last injection on 1/9/2020 and monthly Vitamin b12 injections, DM,   presents to the ED with chest pain, dry cough, fever x 5 days and is admitted for Rt sided PNA.     1. RT sided PNA  CXR right consolidation with trace parapneumonic  effusion  On cefepime and Vancomycin   ID attending note pending- Could probably DC vancomycin   Flu and RVP negative   UA- negative   f/u blood Cx    2. Low risk MDS on Revlimid 2 weeks on and 1 week off  Also on weekly procrit  Hold Revlimid for now     3.  Macrocytic anemia secondary to MDS and B12 deficiency  Received 1 unit PRBC today  Pt on weekly procrit (last dose 1/9/20), monthly B12 injection  Iron- 16, %sat-9%- check ferritin  Monitor CBC and transfuse as needed to keep Hb>7    4. Acute kidney injury on CKD- likely pre renal  cw ivf  avoid nephrotoxins  Monitor BMP and urine outpt    DVT PPx: Heparin  From home, lives with daughter and grand children

## 2020-01-10 NOTE — H&P ADULT - ASSESSMENT
74 years old female known to have Myelodysplastic syndrome ( On Revlimid 2 weeks on and 1 week off, and procrit injections once a week, last injection this morning and monthly Vitamin b12 injections), DM,   presents to the ED with chest pain, dry cough, fever x 5 days    In the ED, CXR showed right lung field consolidation. Pt had Tmax of 100.5. Lab workup is significant for macrocytic + iron def anemia, lactic acidosis and acute kidney injury. RVP negative. 74 years old female known to have Myelodysplastic syndrome ( On Revlimid 2 weeks on and 1 week off, and procrit injections once a week, last injection this morning and monthly Vitamin b12 injections), DM,   presents to the ED with chest pain, dry cough, fever x 5 days    In the ED, CXR showed right lung field consolidation. Pt had Tmax of 100.5. Lab workup is significant for macrocytic + iron def anemia, lactic acidosis and acute kidney injury. RVP negative.    Assessment and Plan:    # Possible gram negative pneumonia with parapneumonic effusion (MRSA risk factor)  # Lactate slightly elevated  CURB score 2 > moderate risk  s/p cefepime and vanco in ED  RVP negative  lactic acidosis  CXR right consolidation with effusion    Plan:  fu blood Cx  fu urine legionella and streptococcus  tylenol prn for fever  fu MRSA  cw vanomycin and levofloxacin for now, vanco trough before 4th dose  fu ID eval  cw ivf      # Macrocytic + iron def anemia  Hb 6.8 then repeat 7.1  keep active type and screen  transfuse to keep Hb > 7 (Pt aware)  fu Vitamin B12 / folate / Iron studies  pt on vitamin B12 / procrit injections (ferritin would be more sp)      # Acute kidney injury on chronic kidney disease 3  likely pre renal  fu renal usg to rule out obstruction  cw ivf  avoid nephrotoxins    # Lactic acidosis  cw ivf  fu am labs    # DM type 2  holding oral hypoglycemics  finger stick AC + HS  start insulin if FS > 180 consistently  carb consistent diet  fu HbA1c and lipid profile      # MDS on Revlimid / procrit  outpt heme onc fu    DVT PPx: Heparin  GI PPx: PPI  Diet: carb consistent diet  Activity: as tolearted

## 2020-01-10 NOTE — PROGRESS NOTE ADULT - SUBJECTIVE AND OBJECTIVE BOX
SIMONA KUHN  74y, Female  Allergy: No Known Allergies      CHIEF COMPLAINT:     HPI:  74 years old female known to have Myelodysplastic syndrome ( On Revlimid 2 weeks on and 1 week off, and procrit injections once a week, last injection this morning and monthly Vitamin b12 injections), DM, presents to the ED with chest pain, dry cough, fever x 5 days  As per pt, she has been having right posterior chest pain x 5 days. It has been gradually progressing, non radiating, sharp pain, 7-8/10 on intensity, no relieving or aggravating factor. Also endorses dry cough and fever at home. Tmax was 100.2. Also had chilld and profuse sweating. Pt states she lost 4 pounds in last 1 week due to decreased appetite. She went to to Dr Wade today for these complaints and was sent to ED for workup.  Pt denies any sick contact, recent travel, abdominal pain, nausea, vomiting, dysuria. Pt ambulates without any assistive device at home.  Patient did not require PRBC transfusions for many years, then missed several doses of procrit in 2019 and subsequently required transfusions.    In the ED, CXR showed right lung field consolidation. Pt had Tmax of 100.5. Lab workup is significant for macrocytic + iron def anemia, lactic acidosis and acute kidney injury. RVP negative (10 Henrik 2020 01:01)    FAMILY HISTORY:    PAST MEDICAL & SURGICAL HISTORY:  MDS (myelodysplastic syndrome)  DM (diabetes mellitus)  No significant past surgical history      SOCIAL HISTORY    Substance Use ( x ) never used  (  ) IVDU (  ) Other:  Tobacco Usage:  (  x ) never smoked   (   ) former smoker   (   ) current smoker   Alcohol Usage: (   ) social  (   ) daily use (  x ) denies         ROS  General: Denies rigors, nightsweats  HEENT: Denies headache, rhinorrhea, sore throat, eye pain  CV: Denies CP, palpitations  PULM: Denies wheezing, hemoptysis, sob, complains dry cough   GI: Denies hematemesis, hematochezia, melena  : Denies discharge, hematuria  MSK: Denies arthralgias, myalgias, complains of back pain right back  SKIN: Denies rash, lesions  NEURO: Denies paresthesias, weakness  PSYCH: Denies depression, anxiety    VITALS:  T(F): 99.8, Max: 101.8 (01-10-20 @ 07:37)  HR: 102  BP: 125/57  RR: 18Vital Signs Last 24 Hrs  T(C): 37.7 (10 Henrik 2020 10:02), Max: 38.8 (10 Henrik 2020 07:37)  T(F): 99.8 (10 Henrik 2020 10:02), Max: 101.8 (10 Henrik 2020 07:37)  HR: 102 (10 Henrik 2020 07:37) (102 - 105)  BP: 125/57 (10 Henrik 2020 07:37) (114/55 - 137/61)  BP(mean): --  RR: 18 (10 Henrik 2020 07:37) (16 - 22)  SpO2: 97% (10 Henrik 2020 07:37) (96% - 97%)    PHYSICAL EXAM:  Gen: NAD, resting in bed  HEENT: Normocephalic, atraumatic  Neck: supple, no lymphadenopathy  CV: Regular rate & regular rhythm  Lungs: decreased BS at bases, ronchi herd in right lobes mild crackles herd in bases.  BACK: no para spinal tenderness, left of back skin healing lesion  Abdomen: Soft, BS present  Ext: Warm, well perfused  Neuro: non focal, awake  Skin: no rash, no erythema    TESTS & MEASUREMENTS:                        6.0    6.19  )-----------( 293      ( 10 Henrik 2020 05:38 )             19.0     01-10    137  |  108  |  21<H>  ----------------------------<  148<H>  3.9   |  17  |  1.7<H>    Ca    8.6      10 Henrik 2020 05:38  Mg     1.6     01-10    TPro  5.6<L>  /  Alb  3.3<L>  /  TBili  0.5  /  DBili  x   /  AST  10  /  ALT  16  /  AlkPhos  67  01-10    eGFR if Non African American: 29 mL/min/1.73M2 (01-10-20 @ 05:38)  eGFR if African American: 34 mL/min/1.73M2 (01-10-20 @ 05:38)  eGFR if Non African American: 32 mL/min/1.73M2 (20 @ 20:15)  eGFR if African American: 37 mL/min/1.73M2 (20 @ 20:15)    LIVER FUNCTIONS - ( 10 Henrik 2020 05:38 )  Alb: 3.3 g/dL / Pro: 5.6 g/dL / ALK PHOS: 67 U/L / ALT: 16 U/L / AST: 10 U/L / GGT: x           Urinalysis Basic - ( 10 Henrik 2020 04:55 )    Color: Light Yellow / Appearance: Clear / S.011 / pH: x  Gluc: x / Ketone: Negative  / Bili: Negative / Urobili: <2 mg/dL   Blood: x / Protein: 30 mg/dL / Nitrite: Negative   Leuk Esterase: Negative / RBC: 3 /HPF / WBC 1 /HPF   Sq Epi: x / Non Sq Epi: 1 /HPF / Bacteria: Negative          Lactate, Blood: 1.1 mmol/L (01-10-20 @ 05:38)  Lactate, Blood: 2.4 mmol/L (20 @ 21:40)  Lactate, Blood: 2.1 mmol/L (20 @ 20:15)  Blood Gas Venous - Lactate: 2.2 mmoL/L (20 @ 20:01)      INFECTIOUS DISEASES TESTING      RADIOLOGY & ADDITIONAL TESTS:  I have personally reviewed the last Chest xray  CXR  Xray Chest 2 Views PA/Lat:   EXAM:  XR CHEST PA LAT 2V            PROCEDURE DATE:  2020            INTERPRETATION:  Clinical History / Reason for exam: Sepsis    Comparison : Chest radiograph 2020.    Technique/Positioning: PA and lateral views of the chest.    Findings:    Support devices: None.    Cardiac/mediastinum/hilum: Unchanged    Lung parenchyma/Pleura: Unchanged right upper lobe opacity. No pneumothorax.    Skeleton/soft tissues: Unchanged    Impression:      Unchanged right upper lobe opacity.                      ELLIOT LANDAU M.D., ATTENDING RADIOLOGIST  This document has been electronically signed. Henrik 10 2020  7:13AM             (20 @ 20:35)  Xray Chest 2 Views PA/Lat:   EXAM:  XR CHEST PA LAT 2V            PROCEDURE DATE:  2020            INTERPRETATION:  CLINICAL HISTORY: cough. Back pain and cough for one to 2 weeks.    COMPARISON: 2018.    TECHNIQUE: Portable frontal chest radiograph. Adequate positioning.    FINDINGS:    Support devices: None.    Cardiac/mediastinum/hilum: Unremarkable.    Lung parenchyma/Pleura: Focal consolidation in the superior segment of the right lower lobe. Trace right parapneumonic effusion. No pneumothorax.    Skeleton/soft tissues: Unremarkable.      IMPRESSION:      Focal consolidation in the superior segment of the right lower lobe.     Trace right parapneumonic effusion.    Spoke with MIGUEL WADE MD               on 2020 2:09 PM with readback.              MARIBELL PACKER M.D., ATTENDING RADIOLOGIST  This document has been electronically signed. 2020  2:11PM             (20 @ 12:32)      CT      CARDIOLOGY TESTING  12 Lead ECG:   Ventricular Rate 112 BPM    Atrial Rate 112 BPM    P-R Interval 138 ms    QRS Duration 80 ms    Q-T Interval 334 ms    QTC Calculation(Bezet) 455 ms    P Axis 66 degrees    R Axis 49 degrees    T Axis 76 degrees    Diagnosis Line Sinus tachycardia  Nonspecific ST and T wave abnormality  Abnormal ECG    Confirmed by Edmond Maddox ([id number]) on 1/10/2020 5:52:11 AM (20 @ 20:38)      MEDICATIONS  aspirin  chewable 81  cefepime   IVPB   chlorhexidine 4% Liquid 1  heparin  Injectable 5000  lactated ringers. 1000  lenalidomide 5  pantoprazole    Tablet 40  vancomycin  IVPB 1000      ANTIBIOTICS:  cefepime   IVPB      vancomycin  IVPB 1000 milliGRAM(s) IV Intermittent every 24 hours

## 2020-01-11 LAB
ALBUMIN SERPL ELPH-MCNC: 3.4 G/DL — LOW (ref 3.5–5.2)
ALP SERPL-CCNC: 86 U/L — SIGNIFICANT CHANGE UP (ref 30–115)
ALT FLD-CCNC: 19 U/L — SIGNIFICANT CHANGE UP (ref 0–41)
ANION GAP SERPL CALC-SCNC: 11 MMOL/L — SIGNIFICANT CHANGE UP (ref 7–14)
AST SERPL-CCNC: 14 U/L — SIGNIFICANT CHANGE UP (ref 0–41)
BASOPHILS # BLD AUTO: 0.06 K/UL — SIGNIFICANT CHANGE UP (ref 0–0.2)
BASOPHILS NFR BLD AUTO: 0.8 % — SIGNIFICANT CHANGE UP (ref 0–1)
BILIRUB SERPL-MCNC: 0.7 MG/DL — SIGNIFICANT CHANGE UP (ref 0.2–1.2)
BUN SERPL-MCNC: 19 MG/DL — SIGNIFICANT CHANGE UP (ref 10–20)
CALCIUM SERPL-MCNC: 8.6 MG/DL — SIGNIFICANT CHANGE UP (ref 8.5–10.1)
CHLORIDE SERPL-SCNC: 108 MMOL/L — SIGNIFICANT CHANGE UP (ref 98–110)
CO2 SERPL-SCNC: 18 MMOL/L — SIGNIFICANT CHANGE UP (ref 17–32)
CREAT SERPL-MCNC: 1.5 MG/DL — SIGNIFICANT CHANGE UP (ref 0.7–1.5)
DIR ANTIGLOB POLYSPECIFIC INTERPRETATION: SIGNIFICANT CHANGE UP
EOSINOPHIL # BLD AUTO: 0.2 K/UL — SIGNIFICANT CHANGE UP (ref 0–0.7)
EOSINOPHIL NFR BLD AUTO: 2.6 % — SIGNIFICANT CHANGE UP (ref 0–8)
GLUCOSE BLDC GLUCOMTR-MCNC: 109 MG/DL — HIGH (ref 70–99)
GLUCOSE BLDC GLUCOMTR-MCNC: 186 MG/DL — HIGH (ref 70–99)
GLUCOSE BLDC GLUCOMTR-MCNC: 201 MG/DL — HIGH (ref 70–99)
GLUCOSE BLDC GLUCOMTR-MCNC: 337 MG/DL — HIGH (ref 70–99)
GLUCOSE BLDC GLUCOMTR-MCNC: 95 MG/DL — SIGNIFICANT CHANGE UP (ref 70–99)
GLUCOSE SERPL-MCNC: 303 MG/DL — HIGH (ref 70–99)
HCT VFR BLD CALC: 26 % — LOW (ref 37–47)
HGB BLD-MCNC: 8.5 G/DL — LOW (ref 12–16)
IMM GRANULOCYTES NFR BLD AUTO: 0.5 % — HIGH (ref 0.1–0.3)
LYMPHOCYTES # BLD AUTO: 0.84 K/UL — LOW (ref 1.2–3.4)
LYMPHOCYTES # BLD AUTO: 11.1 % — LOW (ref 20.5–51.1)
MAGNESIUM SERPL-MCNC: 2 MG/DL — SIGNIFICANT CHANGE UP (ref 1.8–2.4)
MCHC RBC-ENTMCNC: 32.7 G/DL — SIGNIFICANT CHANGE UP (ref 32–37)
MCHC RBC-ENTMCNC: 34.1 PG — HIGH (ref 27–31)
MCV RBC AUTO: 104.4 FL — HIGH (ref 81–99)
MONOCYTES # BLD AUTO: 0.92 K/UL — HIGH (ref 0.1–0.6)
MONOCYTES NFR BLD AUTO: 12.1 % — HIGH (ref 1.7–9.3)
MRSA PCR RESULT.: NEGATIVE — SIGNIFICANT CHANGE UP
NEUTROPHILS # BLD AUTO: 5.52 K/UL — SIGNIFICANT CHANGE UP (ref 1.4–6.5)
NEUTROPHILS NFR BLD AUTO: 72.9 % — SIGNIFICANT CHANGE UP (ref 42.2–75.2)
NRBC # BLD: 0 /100 WBCS — SIGNIFICANT CHANGE UP (ref 0–0)
PLATELET # BLD AUTO: 339 K/UL — SIGNIFICANT CHANGE UP (ref 130–400)
POTASSIUM SERPL-MCNC: 3.9 MMOL/L — SIGNIFICANT CHANGE UP (ref 3.5–5)
POTASSIUM SERPL-SCNC: 3.9 MMOL/L — SIGNIFICANT CHANGE UP (ref 3.5–5)
PROT SERPL-MCNC: 6 G/DL — SIGNIFICANT CHANGE UP (ref 6–8)
RBC # BLD: 2.49 M/UL — LOW (ref 4.2–5.4)
RBC # FLD: 22.5 % — HIGH (ref 11.5–14.5)
SODIUM SERPL-SCNC: 137 MMOL/L — SIGNIFICANT CHANGE UP (ref 135–146)
WBC # BLD: 7.58 K/UL — SIGNIFICANT CHANGE UP (ref 4.8–10.8)
WBC # FLD AUTO: 7.58 K/UL — SIGNIFICANT CHANGE UP (ref 4.8–10.8)

## 2020-01-11 PROCEDURE — 99233 SBSQ HOSP IP/OBS HIGH 50: CPT

## 2020-01-11 RX ORDER — INSULIN LISPRO 100/ML
5 VIAL (ML) SUBCUTANEOUS
Refills: 0 | Status: DISCONTINUED | OUTPATIENT
Start: 2020-01-11 | End: 2020-01-13

## 2020-01-11 RX ORDER — INSULIN GLARGINE 100 [IU]/ML
15 INJECTION, SOLUTION SUBCUTANEOUS AT BEDTIME
Refills: 0 | Status: DISCONTINUED | OUTPATIENT
Start: 2020-01-11 | End: 2020-01-13

## 2020-01-11 RX ORDER — DEXTROSE 50 % IN WATER 50 %
15 SYRINGE (ML) INTRAVENOUS ONCE
Refills: 0 | Status: DISCONTINUED | OUTPATIENT
Start: 2020-01-11 | End: 2020-01-13

## 2020-01-11 RX ORDER — INSULIN LISPRO 100/ML
VIAL (ML) SUBCUTANEOUS
Refills: 0 | Status: DISCONTINUED | OUTPATIENT
Start: 2020-01-11 | End: 2020-01-13

## 2020-01-11 RX ORDER — DEXTROSE 50 % IN WATER 50 %
25 SYRINGE (ML) INTRAVENOUS ONCE
Refills: 0 | Status: DISCONTINUED | OUTPATIENT
Start: 2020-01-11 | End: 2020-01-13

## 2020-01-11 RX ORDER — SODIUM CHLORIDE 9 MG/ML
1000 INJECTION, SOLUTION INTRAVENOUS
Refills: 0 | Status: DISCONTINUED | OUTPATIENT
Start: 2020-01-11 | End: 2020-01-13

## 2020-01-11 RX ORDER — DEXTROSE 50 % IN WATER 50 %
12.5 SYRINGE (ML) INTRAVENOUS ONCE
Refills: 0 | Status: DISCONTINUED | OUTPATIENT
Start: 2020-01-11 | End: 2020-01-13

## 2020-01-11 RX ORDER — GLUCAGON INJECTION, SOLUTION 0.5 MG/.1ML
1 INJECTION, SOLUTION SUBCUTANEOUS ONCE
Refills: 0 | Status: DISCONTINUED | OUTPATIENT
Start: 2020-01-11 | End: 2020-01-13

## 2020-01-11 RX ADMIN — Medication 2: at 17:06

## 2020-01-11 RX ADMIN — CEFEPIME 100 MILLIGRAM(S): 1 INJECTION, POWDER, FOR SOLUTION INTRAMUSCULAR; INTRAVENOUS at 02:51

## 2020-01-11 RX ADMIN — Medication 81 MILLIGRAM(S): at 11:08

## 2020-01-11 RX ADMIN — Medication 650 MILLIGRAM(S): at 20:12

## 2020-01-11 RX ADMIN — Medication 5 UNIT(S): at 17:06

## 2020-01-11 RX ADMIN — HEPARIN SODIUM 5000 UNIT(S): 5000 INJECTION INTRAVENOUS; SUBCUTANEOUS at 17:06

## 2020-01-11 RX ADMIN — HEPARIN SODIUM 5000 UNIT(S): 5000 INJECTION INTRAVENOUS; SUBCUTANEOUS at 05:43

## 2020-01-11 RX ADMIN — PANTOPRAZOLE SODIUM 40 MILLIGRAM(S): 20 TABLET, DELAYED RELEASE ORAL at 07:55

## 2020-01-11 RX ADMIN — AZITHROMYCIN 255 MILLIGRAM(S): 500 TABLET, FILM COATED ORAL at 17:07

## 2020-01-11 RX ADMIN — SODIUM CHLORIDE 100 MILLILITER(S): 9 INJECTION, SOLUTION INTRAVENOUS at 09:01

## 2020-01-11 RX ADMIN — Medication 650 MILLIGRAM(S): at 05:43

## 2020-01-11 RX ADMIN — INSULIN GLARGINE 15 UNIT(S): 100 INJECTION, SOLUTION SUBCUTANEOUS at 21:58

## 2020-01-11 NOTE — PHARMACOTHERAPY INTERVENTION NOTE - COMMENTS
I spoke with Dr Parker to see if the patient can bring their own he said he will clarify with the patient

## 2020-01-11 NOTE — PROGRESS NOTE ADULT - SUBJECTIVE AND OBJECTIVE BOX
SUBJECTIVE:    Patient is a 74y old Female who presents with a chief complaint of cough (10 Henrik 2020 13:17)    Currently admitted to medicine with the primary diagnosis of Sepsis     Today is hospital day 2d. This morning she is resting comfortably in bed and reports no new issues or overnight events.     PAST MEDICAL & SURGICAL HISTORY  MDS (myelodysplastic syndrome)  DM (diabetes mellitus)  No significant past surgical history    SOCIAL HISTORY:  Negative for smoking/alcohol/drug use.     ALLERGIES:  No Known Allergies    MEDICATIONS:  STANDING MEDICATIONS  aspirin  chewable 81 milliGRAM(s) Oral daily  azithromycin  IVPB 500 milliGRAM(s) IV Intermittent every 24 hours  azithromycin  IVPB      cefepime   IVPB      cefepime   IVPB 1000 milliGRAM(s) IV Intermittent every 24 hours  chlorhexidine 4% Liquid 1 Application(s) Topical <User Schedule>  heparin  Injectable 5000 Unit(s) SubCutaneous every 12 hours  lactated ringers. 1000 milliLiter(s) IV Continuous <Continuous>  lenalidomide 5 milliGRAM(s) Oral daily  pantoprazole    Tablet 40 milliGRAM(s) Oral before breakfast    PRN MEDICATIONS  acetaminophen   Tablet .. 650 milliGRAM(s) Oral every 6 hours PRN    VITALS:   T(F): 101.7  HR: 97  BP: 154/67  RR: 18  SpO2: 98%    LABS:                        8.5    7.58  )-----------( 339      ( 2020 08:18 )             26.0     01-11    137  |  108  |  19  ----------------------------<  303<H>  3.9   |  18  |  1.5    Ca    8.6      2020 08:18  Mg     2.0         TPro  6.0  /  Alb  3.4<L>  /  TBili  0.7  /  DBili  x   /  AST  14  /  ALT  19  /  AlkPhos  86  01-11    PT/INR - ( 2020 20:15 )   PT: 14.60 sec;   INR: 1.27 ratio         PTT - ( 2020 20:15 )  PTT:18.5 sec  Urinalysis Basic - ( 10 Henrik 2020 04:55 )    Color: Light Yellow / Appearance: Clear / S.011 / pH: x  Gluc: x / Ketone: Negative  / Bili: Negative / Urobili: <2 mg/dL   Blood: x / Protein: 30 mg/dL / Nitrite: Negative   Leuk Esterase: Negative / RBC: 3 /HPF / WBC 1 /HPF   Sq Epi: x / Non Sq Epi: 1 /HPF / Bacteria: Negative            Culture - Blood (collected 2020 20:15)  Source: .Blood Blood-Peripheral  Preliminary Report (2020 04:02):    No growth to date.    Culture - Blood (collected 2020 20:15)  Source: .Blood Blood-Peripheral  Preliminary Report (2020 04:02):    No growth to date.          RADIOLOGY:  < from: US Kidney and Bladder (01.10.20 @ 18:20) >  MPRESSION:    1.  Negative examination of the kidneys. No hydronephrosis.    2.  Urinary bladder volume 272 cc. It is reported that the patient refused to void. Nonspecific nonvisualization of ureteral jets.    < end of copied text >    PHYSICAL EXAM:  GEN: No acute distress  LUNGS: Clear to auscultation bilaterally   HEART: S1/S2 present. RRR.   ABD: Soft, non-tender, non-distended. Bowel sounds present  EXT: NC/NC/NE/2+PP/SEVERINO  NEURO: AAOX3

## 2020-01-11 NOTE — PROGRESS NOTE ADULT - SUBJECTIVE AND OBJECTIVE BOX
HATTIESIMONA  74y  Female      Patient is a 74y old  Female who presents with a chief complaint of cough (10 Henrik 2020 13:17)      INTERVAL HPI/OVERNIGHT EVENTS:  She feels better, her cough and weakness improved, still with fever.   Vital Signs Last 24 Hrs  T(C): 37.4 (2020 13:23), Max: 38.7 (10 Henrik 2020 20:39)  T(F): 99.4 (2020 13:23), Max: 101.7 (10 Henrik 2020 20:39)  HR: 96 (2020 13:23) (90 - 97)  BP: 128/59 (2020 13:23) (118/56 - 154/67)  BP(mean): --  RR: 18 (2020 13:23) (18 - 18)  SpO2: 98% (10 Henrik 2020 16:19) (98% - 98%)      20 @ 07:01  -  20 @ 14:48  --------------------------------------------------------  IN: 240 mL / OUT: 300 mL / NET: -60 mL            Consultant(s) Notes Reviewed:  [x ] YES  [ ] NO          MEDICATIONS  (STANDING):  aspirin  chewable 81 milliGRAM(s) Oral daily  azithromycin  IVPB 500 milliGRAM(s) IV Intermittent every 24 hours  azithromycin  IVPB      cefepime   IVPB      cefepime   IVPB 1000 milliGRAM(s) IV Intermittent every 24 hours  chlorhexidine 4% Liquid 1 Application(s) Topical <User Schedule>  dextrose 5%. 1000 milliLiter(s) (50 mL/Hr) IV Continuous <Continuous>  dextrose 50% Injectable 12.5 Gram(s) IV Push once  dextrose 50% Injectable 25 Gram(s) IV Push once  dextrose 50% Injectable 25 Gram(s) IV Push once  heparin  Injectable 5000 Unit(s) SubCutaneous every 12 hours  insulin glargine Injectable (LANTUS) 15 Unit(s) SubCutaneous at bedtime  insulin lispro (HumaLOG) corrective regimen sliding scale   SubCutaneous three times a day before meals  insulin lispro Injectable (HumaLOG) 5 Unit(s) SubCutaneous three times a day before meals  lactated ringers. 1000 milliLiter(s) (100 mL/Hr) IV Continuous <Continuous>  lenalidomide 5 milliGRAM(s) Oral daily  pantoprazole    Tablet 40 milliGRAM(s) Oral before breakfast    MEDICATIONS  (PRN):  acetaminophen   Tablet .. 650 milliGRAM(s) Oral every 6 hours PRN Temp greater or equal to 38C (100.4F)  dextrose 40% Gel 15 Gram(s) Oral once PRN Blood Glucose LESS THAN 70 milliGRAM(s)/deciliter  glucagon  Injectable 1 milliGRAM(s) IntraMuscular once PRN Glucose LESS THAN 70 milligrams/deciliter      LABS                          8.5    7.58  )-----------( 339      ( 2020 08:18 )             26.0         137  |  108  |  19  ----------------------------<  303<H>  3.9   |  18  |  1.5    Ca    8.6      2020 08:18  Mg     2.0         TPro  6.0  /  Alb  3.4<L>  /  TBili  0.7  /  DBili  x   /  AST  14  /  ALT  19  /  AlkPhos  86        Urinalysis Basic - ( 10 Henrik 2020 04:55 )    Color: Light Yellow / Appearance: Clear / S.011 / pH: x  Gluc: x / Ketone: Negative  / Bili: Negative / Urobili: <2 mg/dL   Blood: x / Protein: 30 mg/dL / Nitrite: Negative   Leuk Esterase: Negative / RBC: 3 /HPF / WBC 1 /HPF   Sq Epi: x / Non Sq Epi: 1 /HPF / Bacteria: Negative      PT/INR - ( 2020 20:15 )   PT: 14.60 sec;   INR: 1.27 ratio         PTT - ( 2020 20:15 )  PTT:18.5 sec  Lactate Trend  01-10 @ 05:38 Lactate:1.1    @ 21:40 Lactate:2.4    @ 20:15 Lactate:2.1         CAPILLARY BLOOD GLUCOSE      POCT Blood Glucose.: 337 mg/dL (2020 11:22)      Culture - Blood (collected 20 @ 20:15)  Source: .Blood Blood-Peripheral  Preliminary Report (20 @ 04:02):    No growth to date.    Culture - Blood (collected 20 @ 20:15)  Source: .Blood Blood-Peripheral  Preliminary Report (20 @ 04:02):    No growth to date.        RADIOLOGY & ADDITIONAL TESTS:    Imaging Personally Reviewed:  [ ] YES  [ ] NO    HEALTH ISSUES - PROBLEM Dx:        PHYSICAL EXAM:  GENERAL: NAD, well-developed  HEAD:  Atraumatic, Normocephalic  EYES: EOMI, PERRLA, conjunctiva and sclera clear  NECK: Supple, No JVD  CHEST/LUNG: right lower lung crackles.   HEART: Regular rate and rhythm; S1 S2  ABDOMEN: Soft, Nontender, Nondistended; Bowel sounds present  EXTREMITIES:  2+ Peripheral Pulses, No clubbing, cyanosis, or edema  PSYCH: AAOx3  NEUROLOGY: non-focal  SKIN: No rashes or lesions

## 2020-01-12 LAB
ALBUMIN SERPL ELPH-MCNC: 3 G/DL — LOW (ref 3.5–5.2)
ALP SERPL-CCNC: 90 U/L — SIGNIFICANT CHANGE UP (ref 30–115)
ALT FLD-CCNC: 20 U/L — SIGNIFICANT CHANGE UP (ref 0–41)
ANION GAP SERPL CALC-SCNC: 14 MMOL/L — SIGNIFICANT CHANGE UP (ref 7–14)
AST SERPL-CCNC: 18 U/L — SIGNIFICANT CHANGE UP (ref 0–41)
BASOPHILS # BLD AUTO: 0.05 K/UL — SIGNIFICANT CHANGE UP (ref 0–0.2)
BASOPHILS NFR BLD AUTO: 0.8 % — SIGNIFICANT CHANGE UP (ref 0–1)
BILIRUB SERPL-MCNC: 0.5 MG/DL — SIGNIFICANT CHANGE UP (ref 0.2–1.2)
BUN SERPL-MCNC: 17 MG/DL — SIGNIFICANT CHANGE UP (ref 10–20)
CALCIUM SERPL-MCNC: 8.2 MG/DL — LOW (ref 8.5–10.1)
CHLORIDE SERPL-SCNC: 108 MMOL/L — SIGNIFICANT CHANGE UP (ref 98–110)
CO2 SERPL-SCNC: 15 MMOL/L — LOW (ref 17–32)
CREAT SERPL-MCNC: 1.5 MG/DL — SIGNIFICANT CHANGE UP (ref 0.7–1.5)
EOSINOPHIL # BLD AUTO: 0.2 K/UL — SIGNIFICANT CHANGE UP (ref 0–0.7)
EOSINOPHIL NFR BLD AUTO: 3.1 % — SIGNIFICANT CHANGE UP (ref 0–8)
GLUCOSE BLDC GLUCOMTR-MCNC: 114 MG/DL — HIGH (ref 70–99)
GLUCOSE BLDC GLUCOMTR-MCNC: 141 MG/DL — HIGH (ref 70–99)
GLUCOSE BLDC GLUCOMTR-MCNC: 193 MG/DL — HIGH (ref 70–99)
GLUCOSE BLDC GLUCOMTR-MCNC: 88 MG/DL — SIGNIFICANT CHANGE UP (ref 70–99)
GLUCOSE BLDC GLUCOMTR-MCNC: 89 MG/DL — SIGNIFICANT CHANGE UP (ref 70–99)
GLUCOSE SERPL-MCNC: 128 MG/DL — HIGH (ref 70–99)
HCT VFR BLD CALC: 21.5 % — LOW (ref 37–47)
HCV AB S/CO SERPL IA: 0.07 S/CO — SIGNIFICANT CHANGE UP (ref 0–0.99)
HCV AB SERPL-IMP: SIGNIFICANT CHANGE UP
HGB BLD-MCNC: 7 G/DL — LOW (ref 12–16)
IMM GRANULOCYTES NFR BLD AUTO: 0.5 % — HIGH (ref 0.1–0.3)
LYMPHOCYTES # BLD AUTO: 0.84 K/UL — LOW (ref 1.2–3.4)
LYMPHOCYTES # BLD AUTO: 13.2 % — LOW (ref 20.5–51.1)
MCHC RBC-ENTMCNC: 32.6 G/DL — SIGNIFICANT CHANGE UP (ref 32–37)
MCHC RBC-ENTMCNC: 34.5 PG — HIGH (ref 27–31)
MCV RBC AUTO: 105.9 FL — HIGH (ref 81–99)
MONOCYTES # BLD AUTO: 1.05 K/UL — HIGH (ref 0.1–0.6)
MONOCYTES NFR BLD AUTO: 16.5 % — HIGH (ref 1.7–9.3)
NEUTROPHILS # BLD AUTO: 4.18 K/UL — SIGNIFICANT CHANGE UP (ref 1.4–6.5)
NEUTROPHILS NFR BLD AUTO: 65.9 % — SIGNIFICANT CHANGE UP (ref 42.2–75.2)
NRBC # BLD: 0 /100 WBCS — SIGNIFICANT CHANGE UP (ref 0–0)
PLATELET # BLD AUTO: 378 K/UL — SIGNIFICANT CHANGE UP (ref 130–400)
POTASSIUM SERPL-MCNC: 3.5 MMOL/L — SIGNIFICANT CHANGE UP (ref 3.5–5)
POTASSIUM SERPL-SCNC: 3.5 MMOL/L — SIGNIFICANT CHANGE UP (ref 3.5–5)
PROCALCITONIN SERPL-MCNC: 1.81 NG/ML — HIGH (ref 0.02–0.1)
PROT SERPL-MCNC: 5.4 G/DL — LOW (ref 6–8)
RBC # BLD: 2.03 M/UL — LOW (ref 4.2–5.4)
RBC # FLD: 21.5 % — HIGH (ref 11.5–14.5)
SODIUM SERPL-SCNC: 137 MMOL/L — SIGNIFICANT CHANGE UP (ref 135–146)
WBC # BLD: 6.35 K/UL — SIGNIFICANT CHANGE UP (ref 4.8–10.8)
WBC # FLD AUTO: 6.35 K/UL — SIGNIFICANT CHANGE UP (ref 4.8–10.8)

## 2020-01-12 PROCEDURE — 99233 SBSQ HOSP IP/OBS HIGH 50: CPT

## 2020-01-12 RX ADMIN — CEFEPIME 100 MILLIGRAM(S): 1 INJECTION, POWDER, FOR SOLUTION INTRAMUSCULAR; INTRAVENOUS at 05:22

## 2020-01-12 RX ADMIN — HEPARIN SODIUM 5000 UNIT(S): 5000 INJECTION INTRAVENOUS; SUBCUTANEOUS at 05:22

## 2020-01-12 RX ADMIN — Medication 5 UNIT(S): at 11:43

## 2020-01-12 RX ADMIN — INSULIN GLARGINE 15 UNIT(S): 100 INJECTION, SOLUTION SUBCUTANEOUS at 21:38

## 2020-01-12 RX ADMIN — Medication 81 MILLIGRAM(S): at 11:43

## 2020-01-12 RX ADMIN — Medication 1: at 11:43

## 2020-01-12 RX ADMIN — Medication 5 UNIT(S): at 16:54

## 2020-01-12 RX ADMIN — Medication 650 MILLIGRAM(S): at 05:31

## 2020-01-12 RX ADMIN — AZITHROMYCIN 255 MILLIGRAM(S): 500 TABLET, FILM COATED ORAL at 15:29

## 2020-01-12 RX ADMIN — SODIUM CHLORIDE 100 MILLILITER(S): 9 INJECTION, SOLUTION INTRAVENOUS at 18:15

## 2020-01-12 RX ADMIN — Medication 5 UNIT(S): at 08:16

## 2020-01-12 NOTE — PROGRESS NOTE ADULT - SUBJECTIVE AND OBJECTIVE BOX
The cultures has bee reviewed , are negative to date.        MRSA/MSSA PCR (01.10.20 @ 21:10)    MRSA PCR Result.: Negative: By: Real-Time PCR (Polymerase Reaction Method)      Legionella pneumophila Antigen, Urine (01.10.20 @ 04:55)    Legionella Antigen, Urine: Negative      FLU A B RSV Detection by PCR (01.09.20 @ 21:40)    Flu A Result: Negative: Negative results do not preclude influenza infection and  should not be used as the sole basis for treatment or  other patient management decisions.  A positive result may occur in the absence of viable virus.  By: [employer] Flu viral assay by Reverse Transcriptase  Polymerase Chain Reaction (RT-PCR).    Flu B Result: Negative    RSV Result: Negative        Culture - Blood (01.09.20 @ 20:15)    Specimen Source: .Blood Blood-Peripheral    Culture Results:   No growth to date.      Culture - Blood (01.09.20 @ 20:15)    Specimen Source: .Blood Blood-Peripheral    Culture Results:   No growth to date.          would recommend:    1. Continue current Abx for now     - will follow up

## 2020-01-12 NOTE — PROGRESS NOTE ADULT - SUBJECTIVE AND OBJECTIVE BOX
SIMONA KUHN  74y  Female      Patient is a 74y old  Female who presents with a chief complaint of cough (10 Henrik 2020 13:17)      INTERVAL HPI/OVERNIGHT EVENTS:  She feels better, still with fever,   Vital Signs Last 24 Hrs  T(C): 37.8 (12 Jan 2020 12:50), Max: 38.9 (11 Jan 2020 19:50)  T(F): 100 (12 Jan 2020 12:50), Max: 102.1 (11 Jan 2020 19:50)  HR: 90 (12 Jan 2020 12:50) (90 - 99)  BP: 120/58 (12 Jan 2020 12:50) (120/58 - 126/57)  BP(mean): --  RR: 18 (12 Jan 2020 12:50) (18 - 18)  SpO2: --      01-11-20 @ 07:01  -  01-12-20 @ 07:00  --------------------------------------------------------  IN: 240 mL / OUT: 300 mL / NET: -60 mL            Consultant(s) Notes Reviewed:  [x ] YES  [ ] NO          MEDICATIONS  (STANDING):  aspirin  chewable 81 milliGRAM(s) Oral daily  azithromycin  IVPB 500 milliGRAM(s) IV Intermittent every 24 hours  azithromycin  IVPB      cefepime   IVPB      cefepime   IVPB 1000 milliGRAM(s) IV Intermittent every 24 hours  chlorhexidine 4% Liquid 1 Application(s) Topical <User Schedule>  dextrose 5%. 1000 milliLiter(s) (50 mL/Hr) IV Continuous <Continuous>  dextrose 50% Injectable 12.5 Gram(s) IV Push once  dextrose 50% Injectable 25 Gram(s) IV Push once  dextrose 50% Injectable 25 Gram(s) IV Push once  heparin  Injectable 5000 Unit(s) SubCutaneous every 12 hours  insulin glargine Injectable (LANTUS) 15 Unit(s) SubCutaneous at bedtime  insulin lispro (HumaLOG) corrective regimen sliding scale   SubCutaneous three times a day before meals  insulin lispro Injectable (HumaLOG) 5 Unit(s) SubCutaneous three times a day before meals  lactated ringers. 1000 milliLiter(s) (100 mL/Hr) IV Continuous <Continuous>  lenalidomide 5 milliGRAM(s) Oral daily  pantoprazole    Tablet 40 milliGRAM(s) Oral before breakfast    MEDICATIONS  (PRN):  acetaminophen   Tablet .. 650 milliGRAM(s) Oral every 6 hours PRN Temp greater or equal to 38C (100.4F)  dextrose 40% Gel 15 Gram(s) Oral once PRN Blood Glucose LESS THAN 70 milliGRAM(s)/deciliter  glucagon  Injectable 1 milliGRAM(s) IntraMuscular once PRN Glucose LESS THAN 70 milligrams/deciliter      LABS                          7.0    6.35  )-----------( 378      ( 12 Jan 2020 07:16 )             21.5     01-12    137  |  108  |  17  ----------------------------<  128<H>  3.5   |  15<L>  |  1.5    Ca    8.2<L>      12 Jan 2020 07:16  Mg     2.0     01-11    TPro  5.4<L>  /  Alb  3.0<L>  /  TBili  0.5  /  DBili  x   /  AST  18  /  ALT  20  /  AlkPhos  90  01-12          Lactate Trend  01-10 @ 05:38 Lactate:1.1   01-09 @ 21:40 Lactate:2.4   01-09 @ 20:15 Lactate:2.1         CAPILLARY BLOOD GLUCOSE      POCT Blood Glucose.: 193 mg/dL (12 Jan 2020 11:21)      Culture - Blood (collected 01-09-20 @ 20:15)  Source: .Blood Blood-Peripheral  Preliminary Report (01-11-20 @ 04:02):    No growth to date.    Culture - Blood (collected 01-09-20 @ 20:15)  Source: .Blood Blood-Peripheral  Preliminary Report (01-11-20 @ 04:02):    No growth to date.        RADIOLOGY & ADDITIONAL TESTS:    Imaging Personally Reviewed:  [ ] YES  [ ] NO    HEALTH ISSUES - PROBLEM Dx:        PHYSICAL EXAM:  GENERAL: NAD, well-developed  HEAD:  Atraumatic, Normocephalic  EYES: EOMI, PERRLA, conjunctiva and sclera clear  NECK: Supple, No JVD  CHEST/LUNG: right lower lung crackles.   HEART: Regular rate and rhythm; S1 S2  ABDOMEN: Soft, Nontender, Nondistended; Bowel sounds present  EXTREMITIES:  2+ Peripheral Pulses, No clubbing, cyanosis, or edema  PSYCH: AAOx3  NEUROLOGY: non-focal  SKIN: No rashes or lesions

## 2020-01-13 ENCOUNTER — TRANSCRIPTION ENCOUNTER (OUTPATIENT)
Age: 75
End: 2020-01-13

## 2020-01-13 VITALS
SYSTOLIC BLOOD PRESSURE: 135 MMHG | RESPIRATION RATE: 18 BRPM | DIASTOLIC BLOOD PRESSURE: 78 MMHG | HEART RATE: 76 BPM | TEMPERATURE: 98 F

## 2020-01-13 LAB
ALBUMIN SERPL ELPH-MCNC: 3 G/DL — LOW (ref 3.5–5.2)
ALP SERPL-CCNC: 92 U/L — SIGNIFICANT CHANGE UP (ref 30–115)
ALT FLD-CCNC: 25 U/L — SIGNIFICANT CHANGE UP (ref 0–41)
ANION GAP SERPL CALC-SCNC: 14 MMOL/L — SIGNIFICANT CHANGE UP (ref 7–14)
AST SERPL-CCNC: 20 U/L — SIGNIFICANT CHANGE UP (ref 0–41)
BASOPHILS # BLD AUTO: 0.05 K/UL — SIGNIFICANT CHANGE UP (ref 0–0.2)
BASOPHILS NFR BLD AUTO: 1 % — SIGNIFICANT CHANGE UP (ref 0–1)
BILIRUB SERPL-MCNC: 0.3 MG/DL — SIGNIFICANT CHANGE UP (ref 0.2–1.2)
BUN SERPL-MCNC: 12 MG/DL — SIGNIFICANT CHANGE UP (ref 10–20)
CALCIUM SERPL-MCNC: 8 MG/DL — LOW (ref 8.5–10.1)
CHLORIDE SERPL-SCNC: 109 MMOL/L — SIGNIFICANT CHANGE UP (ref 98–110)
CO2 SERPL-SCNC: 17 MMOL/L — SIGNIFICANT CHANGE UP (ref 17–32)
CREAT SERPL-MCNC: 1.3 MG/DL — SIGNIFICANT CHANGE UP (ref 0.7–1.5)
CULTURE RESULTS: NO GROWTH — SIGNIFICANT CHANGE UP
EOSINOPHIL # BLD AUTO: 0.23 K/UL — SIGNIFICANT CHANGE UP (ref 0–0.7)
EOSINOPHIL NFR BLD AUTO: 4.8 % — SIGNIFICANT CHANGE UP (ref 0–8)
GLUCOSE BLDC GLUCOMTR-MCNC: 181 MG/DL — HIGH (ref 70–99)
GLUCOSE BLDC GLUCOMTR-MCNC: 208 MG/DL — HIGH (ref 70–99)
GLUCOSE BLDC GLUCOMTR-MCNC: 249 MG/DL — HIGH (ref 70–99)
GLUCOSE SERPL-MCNC: 224 MG/DL — HIGH (ref 70–99)
HCT VFR BLD CALC: 22.6 % — LOW (ref 37–47)
HGB BLD-MCNC: 7.2 G/DL — LOW (ref 12–16)
IMM GRANULOCYTES NFR BLD AUTO: 0.4 % — HIGH (ref 0.1–0.3)
LYMPHOCYTES # BLD AUTO: 0.81 K/UL — LOW (ref 1.2–3.4)
LYMPHOCYTES # BLD AUTO: 16.8 % — LOW (ref 20.5–51.1)
MCHC RBC-ENTMCNC: 31.9 G/DL — LOW (ref 32–37)
MCHC RBC-ENTMCNC: 34 PG — HIGH (ref 27–31)
MCV RBC AUTO: 106.6 FL — HIGH (ref 81–99)
MONOCYTES # BLD AUTO: 0.79 K/UL — HIGH (ref 0.1–0.6)
MONOCYTES NFR BLD AUTO: 16.4 % — HIGH (ref 1.7–9.3)
NEUTROPHILS # BLD AUTO: 2.91 K/UL — SIGNIFICANT CHANGE UP (ref 1.4–6.5)
NEUTROPHILS NFR BLD AUTO: 60.6 % — SIGNIFICANT CHANGE UP (ref 42.2–75.2)
NRBC # BLD: 0 /100 WBCS — SIGNIFICANT CHANGE UP (ref 0–0)
PLATELET # BLD AUTO: 378 K/UL — SIGNIFICANT CHANGE UP (ref 130–400)
POTASSIUM SERPL-MCNC: 4 MMOL/L — SIGNIFICANT CHANGE UP (ref 3.5–5)
POTASSIUM SERPL-SCNC: 4 MMOL/L — SIGNIFICANT CHANGE UP (ref 3.5–5)
PROT SERPL-MCNC: 5.1 G/DL — LOW (ref 6–8)
RBC # BLD: 2.12 M/UL — LOW (ref 4.2–5.4)
RBC # FLD: 21 % — HIGH (ref 11.5–14.5)
S PNEUM AG UR QL: NEGATIVE — SIGNIFICANT CHANGE UP
SODIUM SERPL-SCNC: 140 MMOL/L — SIGNIFICANT CHANGE UP (ref 135–146)
SPECIMEN SOURCE: SIGNIFICANT CHANGE UP
WBC # BLD: 4.81 K/UL — SIGNIFICANT CHANGE UP (ref 4.8–10.8)
WBC # FLD AUTO: 4.81 K/UL — SIGNIFICANT CHANGE UP (ref 4.8–10.8)

## 2020-01-13 PROCEDURE — 99239 HOSP IP/OBS DSCHRG MGMT >30: CPT

## 2020-01-13 RX ORDER — ACETAMINOPHEN 500 MG
2 TABLET ORAL
Qty: 0 | Refills: 0 | DISCHARGE
Start: 2020-01-13

## 2020-01-13 RX ORDER — CIPROFLOXACIN LACTATE 400MG/40ML
1 VIAL (ML) INTRAVENOUS
Qty: 5 | Refills: 0
Start: 2020-01-13 | End: 2020-01-17

## 2020-01-13 RX ADMIN — Medication 81 MILLIGRAM(S): at 12:03

## 2020-01-13 RX ADMIN — Medication 5 UNIT(S): at 07:46

## 2020-01-13 RX ADMIN — Medication 2: at 12:03

## 2020-01-13 RX ADMIN — Medication 5 UNIT(S): at 16:48

## 2020-01-13 RX ADMIN — Medication 2: at 07:46

## 2020-01-13 RX ADMIN — HEPARIN SODIUM 5000 UNIT(S): 5000 INJECTION INTRAVENOUS; SUBCUTANEOUS at 05:28

## 2020-01-13 RX ADMIN — CEFEPIME 100 MILLIGRAM(S): 1 INJECTION, POWDER, FOR SOLUTION INTRAMUSCULAR; INTRAVENOUS at 02:43

## 2020-01-13 RX ADMIN — Medication 5 UNIT(S): at 12:03

## 2020-01-13 RX ADMIN — AZITHROMYCIN 255 MILLIGRAM(S): 500 TABLET, FILM COATED ORAL at 14:51

## 2020-01-13 RX ADMIN — Medication 1: at 16:48

## 2020-01-13 NOTE — PROGRESS NOTE ADULT - ASSESSMENT
74 years old female known to have Myelodysplastic syndrome ( On Revlimid 2 weeks on and 1 week off, and procrit injections once a week, last injection this morning and monthly Vitamin b12 injections), DM,   presents to the ED with chest pain, dry cough, fever x 5 days    In the ED, CXR showed right lung field consolidation. Pt had Tmax of 100.5. Lab workup is significant for macrocytic + iron def anemia, lactic acidosis and acute kidney injury. RVP negative.  :    # Possible gram negative pneumonia with parapneumonic effusion (MRSA risk factor)  # Lactate slightly elevated  CURB score 2 > moderate risk  s/p cefepime and vanco in ED  RVP and MRSA negative  lactic acidosis  CXR right consolidation with effusion  fu blood Cx  fu urine legionella, sterptococcus, mycoplasma and procalcitonin  tylenol prn for fever  c/w ceftriaxone 1g q24 and azithromycin 500mg q24  fu ID eval  cw ivf      # Macrocytic anemia secondary to MDS  keep active type and screen  transfuse to keep Hb > 7 (Pt aware)  B12, folate WNL  pt on vitamin B12 / procrit injections (ferritin would be more sp)  f/u Heme/onc    # Acute kidney injury on chronic kidney disease 3  likely pre renal  fu renal usg to rule out obstruction  cw ivf  avoid nephrotoxins    # Lactic acidosis  cw ivf  fu am labs    # DM type 2  holding oral hypoglycemics  finger stick AC + HS  start insulin if FS > 180 consistently  carb consistent diet  fu HbA1c and lipid profile      # MDS on Revlimid / procrit  outpt heme onc fu  hold revlimid for now as pt in [place]  pt to bring own revlimd from home once cleared by heme onc    DVT PPx: Heparin  GI PPx: PPI  Diet: carb consistent diet  Activity: as tolearted
A 73 yo female DM type 2 Myelodysplastic syndrome and B12 deficiency came to ED c/o chest pain, cough and fever for 5 days, she found with right upper lobe pneumonia.   presents to the ED with chest pain, dry cough, fever x 5 days    A/P:   Sepsis: on admission: fever and tachycardia.   Right lower lobe pneumonia: gram negative is suspected( patient on chemotherapy).   CXR showed consolidation in right upper segment of lower lobe.   Blood Culture: no growth, Legionella is negative. Viral panel is negative.   Continue Cefepime.     MDS:   Continue chemotherapy with Revimid outpatient.   Severe Anemia: Macrocytic: s/p 2 RBC transfusion.   B12 deficiency  continue B12 injection every month and Procrit.     CKD stage 3:   Cr at baseline 1.5    DM type 2  Continue Lantus and Lispro pre-meal.     #Progress Note Handoff:  Pending (specify):  improving fever and sepsis.   Family discussion:  Disposition: Home in 24
A 73 yo female DM type 2 Myelodysplastic syndrome and B12 deficiency came to ED c/o chest pain, cough and fever for 5 days, she found with right upper lobe pneumonia.   presents to the ED with chest pain, dry cough, fever x 5 days  DOING BETTER TODAY     A/P:      Right lower lobe pneumonia: gram negative is suspected( patient on chemotherapy).   CXR showed consolidation in right upper segment of lower lobe.   Blood Culture: no growth, Legionella is negative. Viral panel is negative.   ID RECC FOR OUTPT. PO ABX REVIEWED      MDS:   Continue chemotherapy with Revimid outpatient.   Severe Anemia: Macrocytic: s/p 2 RBC transfusion.   B12 deficiency  continue B12 injection every month and Procrit.     CKD stage 3:   Cr at baseline 1.3    DM type 2  Continue Lantus and Lispro pre-meal.     #Progress Note Handoff:  Pending (specify):  improving fever and sepsis.   Family discussion:  Disposition: POSS TODAY       D/W DR. SAUNDERS
A 75 yo female DM type 2 Myelodysplastic syndrome and B12 deficiency came to ED c/o chest pain, cough and fever for 5 days, she found with right upper lobe pneumonia.   presents to the ED with chest pain, dry cough, fever x 5 days    A/P:   Sepsis: on admission: fever and tachycardia.   Right lower lobe pneumonia: gram negative is suspected( patient on chemotherapy).   CXR showed consolidation in right upper segment of lower lobe.   Blood Culture: no growth, Legionella is negative. Viral panel is negative.   Continue Cefepime.     MDS:   Continue chemotherapy with Revimid outpatient.   Severe Anemia: Macrocytic: s/p 2 RBC transfusion.   B12 deficiency  continue B12 injection every month and Procrit.     CKD stage 3:   Cr at baseline 1.5    DM type 2  Continue Lantus and Lispro pre-meal.     #Progress Note Handoff:  Pending (specify):  improving fever and sepsis.   Family discussion:  Disposition: Home in 24-48 hrs.
ASSESSMENT  74y F admitted with Sever SEPSIS likely secondary to PNEUMONIA.      MDS (myelodysplastic syndrome) on   DM (diabetes mellitus)    LABs  WBC 6.1<-9  Cr. 1.7  HgB 6.0  Flu A/B RSV Negative   UA Negtive   CXR - Showing right Opacities   CURB65= 2  Blood Cx: Pending            IMPRESSION  Sever Sepsis Possible Secondary to Viral vs. Bacterial PNA  Immunocompromised?  MDS on Revlimid          RECOMMENDATIONS
IMPRESSION:  # Bronchitis with low grade fevers with no convincing evidence of bacterial PNA  -BCx NG  -RVP NG  -CXR hazy RUL  -WBC 6.3    RECOMMENDATIONS:  -po Levoquin 500 mg q24h for 5 days ( as I am concerned about the haziness RUL )  -d/c isolation

## 2020-01-13 NOTE — PROGRESS NOTE ADULT - SUBJECTIVE AND OBJECTIVE BOX
Patient is a 74y old  Female who presents with a chief complaint of cough (10 Henrik 2020 13:17)  admitted with PNA  feeling much better today   has a cough with min. mucous   denies fever/ chills/ nvd/ sob/ cp     having normal BM and urination     PAST MEDICAL & SURGICAL HISTORY:  MDS (myelodysplastic syndrome)  DM (diabetes mellitus)  No significant past surgical history    Allergies    No Known Allergies      MEDICATIONS  (STANDING):  aspirin  chewable 81 milliGRAM(s) Oral daily  azithromycin  IVPB 500 milliGRAM(s) IV Intermittent every 24 hours  azithromycin  IVPB      cefepime   IVPB      cefepime   IVPB 1000 milliGRAM(s) IV Intermittent every 24 hours  chlorhexidine 4% Liquid 1 Application(s) Topical <User Schedule>  dextrose 5%. 1000 milliLiter(s) (50 mL/Hr) IV Continuous <Continuous>  dextrose 50% Injectable 12.5 Gram(s) IV Push once  dextrose 50% Injectable 25 Gram(s) IV Push once  dextrose 50% Injectable 25 Gram(s) IV Push once  heparin  Injectable 5000 Unit(s) SubCutaneous every 12 hours  insulin glargine Injectable (LANTUS) 15 Unit(s) SubCutaneous at bedtime  insulin lispro (HumaLOG) corrective regimen sliding scale   SubCutaneous three times a day before meals  insulin lispro Injectable (HumaLOG) 5 Unit(s) SubCutaneous three times a day before meals  lactated ringers. 1000 milliLiter(s) (100 mL/Hr) IV Continuous <Continuous>  lenalidomide 5 milliGRAM(s) Oral daily  pantoprazole    Tablet 40 milliGRAM(s) Oral before breakfast    MEDICATIONS  (PRN):  acetaminophen   Tablet .. 650 milliGRAM(s) Oral every 6 hours PRN Temp greater or equal to 38C (100.4F)  dextrose 40% Gel 15 Gram(s) Oral once PRN Blood Glucose LESS THAN 70 milliGRAM(s)/deciliter  glucagon  Injectable 1 milliGRAM(s) IntraMuscular once PRN Glucose LESS THAN 70 milligrams/deciliter      01-13    140  |  109  |  12  ----------------------------<  224<H>  4.0   |  17  |  1.3    Ca    8.0<L>      13 Jan 2020 07:12    TPro  5.1<L>  /  Alb  3.0<L>  /  TBili  0.3  /  DBili  x   /  AST  20  /  ALT  25  /  AlkPhos  92  01-13                            7.2    4.81  )-----------( 378      ( 13 Jan 2020 07:12 )             22.6         Lactate Trend  01-10 @ 05:38 Lactate:1.1   01-09 @ 21:40 Lactate:2.4   01-09 @ 20:15 Lactate:2.1         CAPILLARY BLOOD GLUCOSE      POCT Blood Glucose.: 193 mg/dL (12 Jan 2020 11:21)      Culture - Blood (collected 01-09-20 @ 20:15)  Source: .Blood Blood-Peripheral  Preliminary Report (01-11-20 @ 04:02):    No growth to date.    Culture - Blood (collected 01-09-20 @ 20:15)  Source: .Blood Blood-Peripheral  Preliminary Report (01-11-20 @ 04:02):    No growth to date.        PHYSICAL EXAM:  GENERAL: NAD, well-developed  HEAD:  Atraumatic, Normocephalic  EYES: EOMI, PERRLA, conjunctiva and sclera clear  CHEST/LUNG: right lower lung crackles.   HEART: Regular rate and rhythm; S1 S2  ABDOMEN: Soft, Nontender, Nondistended; Bowel sounds present  EXTREMITIES:  2+ Peripheral Pulses, No clubbing, cyanosis, or edema  PSYCH: AAOx3  NEUROLOGY: non-focal

## 2020-01-13 NOTE — DISCHARGE NOTE NURSING/CASE MANAGEMENT/SOCIAL WORK - PATIENT PORTAL LINK FT
You can access the FollowMyHealth Patient Portal offered by [facility] by registering at the following website: [email or web address]. By joining [facility]’s FollowMyHealth portal, you will also be able to view your health information using other applications (apps) compatible with our system.

## 2020-01-13 NOTE — DISCHARGE NOTE PROVIDER - HOSPITAL COURSE
74 years old female known to have Myelodysplastic syndrome ( On Revlimid 2 weeks on and 1 week off, and procrit injections once a week, last injection this morning and monthly Vitamin b12 injections), DM, presents to the ED with chest pain, dry cough, fever x 5 days    As per pt, she has been having right posterior chest pain x 5 days. It has been gradually progressing, non radiating, sharp pain, 7-8/10 on intensity, no relieving or aggravating factor. Also endorses dry cough and fever at home. Tmax was 100.2. Also had chilld and profuse sweating. Pt states she lost 4 pounds in last 1 week due to decreased appetite. She went to to Dr Wade today for these complaints and was sent to ED for workup.    Pt denies any sick contact, recent travel, abdominal pain, nausea, vomiting, dysuria. Pt ambulates without any assistive device at home.    Patient did not require PRBC transfusions for many years, then missed several doses of procrit in September 2019 and subsequently required transfusions.        In the ED, CXR showed right lung field consolidation. Pt had Tmax of 100.5. Lab workup is significant for macrocytic + iron def anemia, lactic acidosis and acute kidney injury. RVP negative                    iv abx given and responded well        hospital day 4 :    afeb     labs adequate     feeling better         ID recc followed         will d/c with levaquin x 5 days     f/u with pmd in 1 week 74 years old female known to have Myelodysplastic syndrome ( On Revlimid 2 weeks on and 1 week off, and procrit injections once a week, last injection this morning and monthly Vitamin b12 injections), DM, presents to the ED with chest pain, dry cough, fever x 5 days    As per pt, she has been having right posterior chest pain x 5 days. It has been gradually progressing, non radiating, sharp pain, 7-8/10 on intensity, no relieving or aggravating factor. Also endorses dry cough and fever at home. Tmax was 100.2. Also had chilld and profuse sweating. Pt states she lost 4 pounds in last 1 week due to decreased appetite. She went to to Dr Wade today for these complaints and was sent to ED for workup.    Pt denies any sick contact, recent travel, abdominal pain, nausea, vomiting, dysuria. Pt ambulates without any assistive device at home.    Patient did not require PRBC transfusions for many years, then missed several doses of procrit in September 2019 and subsequently required transfusions.        In the ED, CXR showed right lung field haziness. Pt had Tmax of 100.5. Lab workup is significant for macrocytic + iron def anemia, lactic acidosis and acute kidney injury. RVP negative    Pt was treated with Cefepime and Azithromycin, ID reviewed the case and recommended: Bronchitis with low grade fevers with no convincing evidence of bacterial PNA. PO Levaquin         Pt advised to f/u with PMD or come to ER if febrile, SOB or chest pain, she expressed understanding.

## 2020-01-13 NOTE — DISCHARGE NOTE PROVIDER - NSDCCPCAREPLAN_GEN_ALL_CORE_FT
PRINCIPAL DISCHARGE DIAGNOSIS  Diagnosis: Pneumonia  Assessment and Plan of Treatment: continue with antibiotics  increase fluids and rest   follow up with pmd in 1 week PRINCIPAL DISCHARGE DIAGNOSIS  Diagnosis: Acute bronchitis  Assessment and Plan of Treatment: continue with antibiotics  increase fluids and rest   follow up with pmd in 1 week

## 2020-01-13 NOTE — DISCHARGE NOTE PROVIDER - CARE PROVIDER_API CALL
David Torres)  Internal Medicine  [address] [place] [address]  Phone: [phone]  Fax: [phone]  Established Patient  Follow Up Time: 1 week

## 2020-01-13 NOTE — PROGRESS NOTE ADULT - SUBJECTIVE AND OBJECTIVE BOX
SIMONA KUHN  74y, Female    All available historical data reviewed    OVERNIGHT EVENTS:  low grade fevers    ROS:  General: Denies rigors, nightsweats  HEENT: Denies headache, rhinorrhea, sore throat, eye pain  CV: Denies CP, palpitations  PULM: Denies wheezing, hemoptysis, minimal cough, no chest pain/phlegm  GI: Denies hematemesis, hematochezia, melena  : Denies discharge, hematuria  MSK: Denies arthralgias, myalgias  SKIN: Denies rash, lesions  NEURO: Denies paresthesias, weakness  PSYCH: Denies depression, anxiety    VITALS:  T(F): 99.3, Max: 100 (01-12-20 @ 12:50)  HR: 89  BP: 134/89  RR: 18Vital Signs Last 24 Hrs  T(C): 37.4 (13 Jan 2020 05:18), Max: 37.8 (12 Jan 2020 12:50)  T(F): 99.3 (13 Jan 2020 05:18), Max: 100 (12 Jan 2020 12:50)  HR: 89 (13 Jan 2020 05:18) (89 - 90)  BP: 134/89 (13 Jan 2020 05:18) (120/58 - 134/89)  BP(mean): --  RR: 18 (13 Jan 2020 05:18) (18 - 18)  SpO2: --    TESTS & MEASUREMENTS:                        7.0    6.35  )-----------( 378      ( 12 Jan 2020 07:16 )             21.5     01-12    137  |  108  |  17  ----------------------------<  128<H>  3.5   |  15<L>  |  1.5    Ca    8.2<L>      12 Jan 2020 07:16    TPro  5.4<L>  /  Alb  3.0<L>  /  TBili  0.5  /  DBili  x   /  AST  18  /  ALT  20  /  AlkPhos  90  01-12    LIVER FUNCTIONS - ( 12 Jan 2020 07:16 )  Alb: 3.0 g/dL / Pro: 5.4 g/dL / ALK PHOS: 90 U/L / ALT: 20 U/L / AST: 18 U/L / GGT: x             Culture - Urine (collected 01-11-20 @ 19:30)  Source: .Urine Clean Catch (Midstream)  Final Report (01-13-20 @ 07:16):    No growth    Culture - Blood (collected 01-09-20 @ 20:15)  Source: .Blood Blood-Peripheral  Preliminary Report (01-11-20 @ 04:02):    No growth to date.    Culture - Blood (collected 01-09-20 @ 20:15)  Source: .Blood Blood-Peripheral  Preliminary Report (01-11-20 @ 04:02):    No growth to date.            RADIOLOGY & ADDITIONAL TESTS:  Personal review of radiological diagnostics performed  Echo and EKG results noted when applicable.     ANTIBIOTICS:  azithromycin  IVPB 500 milliGRAM(s) IV Intermittent every 24 hours  azithromycin  IVPB      cefepime   IVPB      cefepime   IVPB 1000 milliGRAM(s) IV Intermittent every 24 hours

## 2020-01-13 NOTE — DISCHARGE NOTE PROVIDER - NSDCFUSCHEDAPPT_GEN_ALL_CORE_FT
SIMONA KUHN ; 01/16/2020 ; [facility] ; 01/16/2020 ; [facility] ; 01/23/2020 ; [facility] ; 01/30/2020 ; [facility] [place] ; 01/30/2020 ; [facility] ; 02/06/2020 ; [facility] ; 02/13/2020 ; [facility] SIMONA KUHN ; 01/16/2020 ; [facility] SIMONA [place] ; 01/16/2020 ; [facility] SIMONA [facility] ; 01/23/2020 ; [facility] SIMONA [facility] ; 01/30/2020 ; [facility] SIMONA [place] ; 01/30/2020 ; [facility] SIMONA [facility] ; 02/06/2020 ; [facility] SIMONA [facility] ; 02/13/2020 ; [facility]

## 2020-01-13 NOTE — DISCHARGE NOTE PROVIDER - NSDCMRMEDTOKEN_GEN_ALL_CORE_FT
acetaminophen 325 mg oral tablet: 2 tab(s) orally every 6 hours, As needed, Temp greater or equal to 38C (100.4F)  aspirin 81 mg oral tablet, chewable: 1 tab(s) orally once a day  glimepiride 2 mg oral tablet: 1 tab(s) orally once a day  Levaquin 500 mg oral tablet: 1 tab(s) orally once a day   metFORMIN: 500 milligram(s) orally 2 times a day  Revlimid 5 mg oral capsule: 1 cap(s) orally once a day

## 2020-01-13 NOTE — PROGRESS NOTE ADULT - ATTENDING COMMENTS
Agree with resident's note, HPI, PE, assessment and plan.  Pt was seen and examined independently.     Pt feels much better, she is able to ambulate and on RA.       Physical exam:  NAD, not toxic looking  NECK: supple, no JVD  RESP: CTA b/l, no crackles, rhonchi  CVS: S1S2, RRR  GI: abdomen soft NT, ND  Extremities: no c/c/edema  NEURO: AOx3, no focal deficit    # Acute bronchitis  ID recommendations noted.  Change ABx to PO Levaquin for 5 more days.    Pt advised to come to ED or f/u with PMD right away if febrile, SOB or chest pain, she expressed understanding.

## 2020-01-14 LAB
APPEARANCE: ABNORMAL
BILIRUBIN URINE: NEGATIVE
BLOOD URINE: NEGATIVE
COLOR: YELLOW
GLUCOSE QUALITATIVE U: ABNORMAL
KETONES URINE: NEGATIVE
LEUKOCYTE ESTERASE URINE: NEGATIVE
NITRITE URINE: NEGATIVE
PH URINE: 6
PROTEIN URINE: ABNORMAL
SPECIFIC GRAVITY URINE: 1.02
UROBILINOGEN URINE: NORMAL

## 2020-01-15 LAB
BACTERIA BLD CULT: NORMAL
BACTERIA BLD CULT: NORMAL
CULTURE RESULTS: SIGNIFICANT CHANGE UP
CULTURE RESULTS: SIGNIFICANT CHANGE UP
M PNEUMO IGM SER-ACNC: 27 UNITS/ML — SIGNIFICANT CHANGE UP
MYCOPLASMA AG SPEC QL: NEGATIVE — SIGNIFICANT CHANGE UP
SPECIMEN SOURCE: SIGNIFICANT CHANGE UP
SPECIMEN SOURCE: SIGNIFICANT CHANGE UP

## 2020-01-16 ENCOUNTER — APPOINTMENT (OUTPATIENT)
Dept: INFUSION THERAPY | Facility: CLINIC | Age: 75
End: 2020-01-16
Payer: MEDICARE

## 2020-01-16 ENCOUNTER — APPOINTMENT (OUTPATIENT)
Dept: HEMATOLOGY ONCOLOGY | Facility: CLINIC | Age: 75
End: 2020-01-16
Payer: MEDICARE

## 2020-01-16 ENCOUNTER — LABORATORY RESULT (OUTPATIENT)
Age: 75
End: 2020-01-16

## 2020-01-16 VITALS
SYSTOLIC BLOOD PRESSURE: 161 MMHG | HEIGHT: 62 IN | DIASTOLIC BLOOD PRESSURE: 66 MMHG | WEIGHT: 145 LBS | BODY MASS INDEX: 26.68 KG/M2 | TEMPERATURE: 98.6 F | HEART RATE: 99 BPM

## 2020-01-16 LAB
HCT VFR BLD CALC: 24.6 %
HGB BLD-MCNC: 8 G/DL
MCHC RBC-ENTMCNC: 32.5 G/DL
MCHC RBC-ENTMCNC: 34.3 PG
MCV RBC AUTO: 105.6 FL
PLATELET # BLD AUTO: 354 K/UL
PMV BLD: 10.7 FL
RBC # BLD: 2.33 M/UL
RBC # FLD: 19.1 %
WBC # FLD AUTO: 4.41 K/UL

## 2020-01-16 PROCEDURE — 99214 OFFICE O/P EST MOD 30 MIN: CPT

## 2020-01-16 RX ORDER — PREGABALIN 225 MG/1
1000 CAPSULE ORAL ONCE
Refills: 0 | Status: COMPLETED | OUTPATIENT
Start: 2020-01-16 | End: 2020-01-16

## 2020-01-16 RX ORDER — ERYTHROPOIETIN 10000 [IU]/ML
80000 INJECTION, SOLUTION INTRAVENOUS; SUBCUTANEOUS ONCE
Refills: 0 | Status: COMPLETED | OUTPATIENT
Start: 2020-01-16 | End: 2020-01-16

## 2020-01-16 RX ADMIN — ERYTHROPOIETIN 80000 UNIT(S): 10000 INJECTION, SOLUTION INTRAVENOUS; SUBCUTANEOUS at 12:37

## 2020-01-16 RX ADMIN — PREGABALIN 1000 MICROGRAM(S): 225 CAPSULE ORAL at 12:37

## 2020-01-16 NOTE — HISTORY OF PRESENT ILLNESS
[de-identified] : She missed on appointment for procrit last week.\par She starts her next cycle on 6/25/18\par C/o back pain radiating down her left which occurred when she bent to  something.\par No change in diarrhea.\par \par 7/31/18:\par Doing well. On Revlimid for more than 2yrs, 5mg 2weeks on 1 week off. She will be starting week on tonight. \par C/o diarrhea. On Imodium 2 pills AM and 2 pills PM. Doesn't help much with diarrhea. \par C/o nausea, abd pain. \par Colonoscopy in 2016 was normal. \par Did not have blood transfusion for over 2 years. \par On procrit [id number] weekly. Missed last week on 7/24/18 as she was out of town. She has been non compliant with weekly Procrit.\par Mammogram in 2017 was normal. \par \par 9/11/18\par pt is here for follow up.\par She feels the lomotil helps with the diarrhea.\par She was away for a few weeks and missed her procrit injections.\par No fever, abdominal  discomfort has been less since since use of lomotil.\par She started a new cycle 2 days ago.\par \par 10/2/18:\par She will start Revlimid tonight (week on). 2 weeks on, 1 week off. \par On ASA 81mg. \par Denies fever, nausea, vomiting, chest pain, SOB, abdominal pain, and bladder problems.\par C/o diarrhea. \par S/p 2 units PRBC transfusion on 9/25/18. \par On Procrit 78897 units weekly. Not compliant every week. \par C/o intermittent dizziness. \par \par 10/31/18\par Pt is here for follow up.\par C/O significant fatigue.\par Continues to have diarrhea, takes imodium and lomotil as needed.\par C/o dry cough, no fever.\par Cough started a month ago. It is worse in the mornings however over last week it has been constant all day.\par No chest pain.\par She was found to have low B12 levels and was started on B12 injections.\par \par 11/27/18:\par Doing well. Hospitalized for 3 days for cellulitis/abcess of the back s/p drainage and on Abx currently. Developed 3 days after Mg infusion as per pt. \par Denies nausea, vomiting, chest pain, SOB, abdominal pain, bowel and bladder problems.\par This is the week on Revlimid (week 1).\par S/p 1 unit PRBC transfusion in the hospital. \par On Procrit weekly \par \par 12/27/18:\par Doing well. No major complaints.\par Denies fever, nausea, vomiting, chest pain, SOB, abdominal pain, and bladder problems.\par On Revlimid 5 mg 2 weeks on 1 week off. This is the week off. She will start the week on sunday (12/30/18).\par Due for Procrit 08248 units today. \par Still has diarrhea. 4-5bm/day.\par On monthly B12 injections. \par \par 1/30/19:\par Patient here for follow up for MDS.  She is taking Revlimid 5 mg, 2 weeks on, 1 week off.  She will complete this current cycle on Saturday.  She has no new complaints today.  Patient denies cough, shortness of breath, denies fever, denies bone pain.\par \par 03/04/2019\par Patient is here for a follow up visit. She complaints of severe pain in her left shoulder and has had abscess drained 2 weeks ago. However the pain is worse and she has purulent discharge on examination. She also has Nasal sores that are painful. Culture from 11/2018 showed MRSA.  Denies Fever. \par She also complaints of swelling in her right leg. Not tender and not erythematous and negative Gong;s sign. \par Her diarrhea with Revlimid is ongoing and Imodium and Lomotil helps but not everyday. \par She is on 60,000 units of procrit weekly and Revlimid 5 mg 2 weeks on 1 week off. \par \par 4/23/19\par Pt is here for follow up.\par She feels well.\par The nasal sores have improved with bactroban\par The abscess on left shoulder has resolved.\par However she has a new one on the middle of her back.\par No fever.\par Pt has been on procrit 43363 units weekly, however she missed a dose in between.\par \par 5/8/19\par pt is here for follow up.\par no new complaints.\par feels well.\par Her skin abscess has resolved.\par she has been unable to go to ID, as she could not get an appt.\par No bleeding.\par She is compliant with revlimid.\par \par 6/6/19\par Pt is here for follow up.\par no new complaints \par Feels well.\par Is on week 2 of her Revlimid cycle. \par No transfusions since last visit\par \par 7/17/19\par Pt is here for follow up.\par C/O fatigue.\par Was away for a few days two weeks ago, but missed treatment with procrit for 2 weeks.\par Is complaint with Revlimid 2 weeks on 1 week off.\par Diarrhea is a little improved.\par \par \par 8/14/19\par Patient here for follow up visit, feeling well.  Although she is complaining of some pain at the left scapula area recently.  Patient denies cough, shortness of breath, denies fever, denies other bone pain.  She is off Revlimid  this week, due to restart on Monday.  She is scheduled for Procrit and Vitamin B12 injection today.  She plans to leave the country tomorrow to visit her mother who is ill.  She will return in about 10 days.\par \par 9/11/19\par Pt is here for follow up.\par She was away for 3 weeks, out of which she took procrit for 2 weeks in [place].\par She missed last week's dose.\par She had CBCs in [place] which showed Hgb of 8.9\par She feels well.\par She still getting diarrhea.\par She has been using lomotil with very minimal relief.\par She started a new cycle of Revlimid 4 days ago.\par \par 10/3/19\par Patient here for follow up visit, feeling fairly well.   Patient denies cough, shortness of breath, denies fever, denies other bone pain.  She remains on Revlimid.  She is scheduled for Procrit and Vitamin B12 injection today.\par \par 10/24/19\par Pt is here for follow up.\par Feels well.\par No SOB, fatigue.\par Compliant with procrit and Revl;imid.\par Remains on ASa.\par Diarrhea is unchanged.\par Pt takes imodium as needed.\par \par 11/14/19\par Pt is here for follow up.\par No new complaints.\par Starts a new cycle of Revlimid today.\par Diarrhea is same as before, no rectal bleeding.\par No fever.\par \par 12/5/19\par Pt is here for follow up.\par No new complaints.\par Denies feeling weaker than usual.\par No fever, SOB.\par No change in frequency of diarrhea.\par No pain.\par Will start next cycle of Revlimid in 3 days.\par \par \par !/16/20\par Pt was recently DCed from [facility] 3 days ago after being treated for pneumonia and MARCO.\par She is on po Levaquin.\par She restarted Revlmid 3 days ago.\par She is feeling better now. No fever.\par No SOB, Chest pain and back pain has resolved.\par Pt has a cough, no expectoration.\par She also recd a unit of PRBCs last week in the hospital\par  [de-identified] : \par

## 2020-01-16 NOTE — REVIEW OF SYSTEMS
[Shortness Of Breath] : shortness of breath [Cough] : cough [Diarrhea] : diarrhea [Negative] : Heme/Lymph [FreeTextEntry6] : chronic, intermittent SOB

## 2020-01-16 NOTE — PHYSICAL EXAM
[Fully active, able to carry on all pre-disease performance without restriction] : Status 0 - Fully active, able to carry on all pre-disease performance without restriction [Normal] : grossly intact [de-identified] : pharynx clear, no exudates [de-identified] : Abscess on left scapular region has improved, new abscess interscapular area. [de-identified] : abcess on the back has resolved completely, no evidence of recurrence.

## 2020-01-16 NOTE — ASSESSMENT
[FreeTextEntry1] : Lowrisk myelodysplastic syndrome, on Revlimid and Procrit. \par Pt was also diagnosed with B12 deficiency.\par Pt had pneumonia diagnosed last week, still on ABX\par MARCO resolved now\par \par PLAN:\par continue procrit 87191 units weekly.\par On  Revlimid 5 mg daily 2 weeks on 1 week off.  Encouraged her to take  Imodium and also Lomotil if diarrhea is not controlled. \par Her compliance and any side effects from such treatment were assessed at today's visit\par Side effects of  Revlimid and their management were discussed.\par Continue B12 injections monthly. \par Maintain adequate hydration.\par BMP, Mg ordered\par RTC in 3 weeks.\par \par \par

## 2020-01-17 LAB
ANION GAP SERPL CALC-SCNC: 13 MMOL/L
BUN SERPL-MCNC: 14 MG/DL
CALCIUM SERPL-MCNC: 8.8 MG/DL
CHLORIDE SERPL-SCNC: 105 MMOL/L
CO2 SERPL-SCNC: 19 MMOL/L
CREAT SERPL-MCNC: 1.2 MG/DL
GLUCOSE SERPL-MCNC: 217 MG/DL
MAGNESIUM SERPL-MCNC: 1.7 MG/DL
POTASSIUM SERPL-SCNC: 4.1 MMOL/L
SODIUM SERPL-SCNC: 137 MMOL/L

## 2020-01-21 DIAGNOSIS — D89.9 DISORDER INVOLVING THE IMMUNE MECHANISM, UNSPECIFIED: ICD-10-CM

## 2020-01-21 DIAGNOSIS — E87.2 ACIDOSIS: ICD-10-CM

## 2020-01-21 DIAGNOSIS — J20.9 ACUTE BRONCHITIS, UNSPECIFIED: ICD-10-CM

## 2020-01-21 DIAGNOSIS — R50.9 FEVER, UNSPECIFIED: ICD-10-CM

## 2020-01-21 DIAGNOSIS — D50.9 IRON DEFICIENCY ANEMIA, UNSPECIFIED: ICD-10-CM

## 2020-01-21 DIAGNOSIS — E87.1 HYPO-OSMOLALITY AND HYPONATREMIA: ICD-10-CM

## 2020-01-21 DIAGNOSIS — A41.9 SEPSIS, UNSPECIFIED ORGANISM: ICD-10-CM

## 2020-01-21 DIAGNOSIS — Z79.84 LONG TERM (CURRENT) USE OF ORAL HYPOGLYCEMIC DRUGS: ICD-10-CM

## 2020-01-21 DIAGNOSIS — D46.9 MYELODYSPLASTIC SYNDROME, UNSPECIFIED: ICD-10-CM

## 2020-01-21 DIAGNOSIS — E11.22 TYPE 2 DIABETES MELLITUS WITH DIABETIC CHRONIC KIDNEY DISEASE: ICD-10-CM

## 2020-01-21 DIAGNOSIS — N18.3 CHRONIC KIDNEY DISEASE, STAGE 3 (MODERATE): ICD-10-CM

## 2020-01-23 ENCOUNTER — LABORATORY RESULT (OUTPATIENT)
Age: 75
End: 2020-01-23

## 2020-01-23 ENCOUNTER — APPOINTMENT (OUTPATIENT)
Dept: INFUSION THERAPY | Facility: CLINIC | Age: 75
End: 2020-01-23

## 2020-01-23 ENCOUNTER — OUTPATIENT (OUTPATIENT)
Dept: OUTPATIENT SERVICES | Facility: HOSPITAL | Age: 75
LOS: 1 days | Discharge: HOME | End: 2020-01-23

## 2020-01-23 DIAGNOSIS — E53.8 DEFICIENCY OF OTHER SPECIFIED B GROUP VITAMINS: ICD-10-CM

## 2020-01-23 DIAGNOSIS — D64.9 ANEMIA, UNSPECIFIED: ICD-10-CM

## 2020-01-23 DIAGNOSIS — Z79.899 OTHER LONG TERM (CURRENT) DRUG THERAPY: ICD-10-CM

## 2020-01-23 DIAGNOSIS — D46.9 MYELODYSPLASTIC SYNDROME, UNSPECIFIED: ICD-10-CM

## 2020-01-23 LAB
HCT VFR BLD CALC: 25.7 %
HGB BLD-MCNC: 8.6 G/DL
MCHC RBC-ENTMCNC: 33.5 G/DL
MCHC RBC-ENTMCNC: 34.7 PG
MCV RBC AUTO: 103.6 FL
PLATELET # BLD AUTO: 475 K/UL
PMV BLD: 9.9 FL
RBC # BLD: 2.48 M/UL
RBC # FLD: 18.3 %
WBC # FLD AUTO: 5.91 K/UL

## 2020-01-23 RX ORDER — ERYTHROPOIETIN 10000 [IU]/ML
80000 INJECTION, SOLUTION INTRAVENOUS; SUBCUTANEOUS ONCE
Refills: 0 | Status: COMPLETED | OUTPATIENT
Start: 2020-01-23 | End: 2020-01-23

## 2020-01-23 RX ADMIN — ERYTHROPOIETIN 80000 UNIT(S): 10000 INJECTION, SOLUTION INTRAVENOUS; SUBCUTANEOUS at 12:14

## 2020-01-30 ENCOUNTER — LABORATORY RESULT (OUTPATIENT)
Age: 75
End: 2020-01-30

## 2020-01-30 ENCOUNTER — APPOINTMENT (OUTPATIENT)
Dept: HEMATOLOGY ONCOLOGY | Facility: CLINIC | Age: 75
End: 2020-01-30
Payer: MEDICARE

## 2020-01-30 ENCOUNTER — APPOINTMENT (OUTPATIENT)
Dept: INFUSION THERAPY | Facility: CLINIC | Age: 75
End: 2020-01-30
Payer: MEDICARE

## 2020-01-30 VITALS
DIASTOLIC BLOOD PRESSURE: 54 MMHG | TEMPERATURE: 98.8 F | HEIGHT: 62 IN | RESPIRATION RATE: 14 BRPM | WEIGHT: 145 LBS | HEART RATE: 94 BPM | SYSTOLIC BLOOD PRESSURE: 118 MMHG | BODY MASS INDEX: 26.68 KG/M2

## 2020-01-30 DIAGNOSIS — R05 COUGH: ICD-10-CM

## 2020-01-30 LAB
HCT VFR BLD CALC: 23.3 %
HGB BLD-MCNC: 7.8 G/DL
MCHC RBC-ENTMCNC: 33.5 G/DL
MCHC RBC-ENTMCNC: 35.1 PG
MCV RBC AUTO: 105 FL
PLATELET # BLD AUTO: 355 K/UL
PMV BLD: 10.2 FL
RBC # BLD: 2.22 M/UL
RBC # FLD: 18.7 %
WBC # FLD AUTO: 4.22 K/UL

## 2020-01-30 PROCEDURE — 99214 OFFICE O/P EST MOD 30 MIN: CPT

## 2020-01-30 RX ORDER — ERYTHROPOIETIN 10000 [IU]/ML
80000 INJECTION, SOLUTION INTRAVENOUS; SUBCUTANEOUS ONCE
Refills: 0 | Status: COMPLETED | OUTPATIENT
Start: 2020-01-30 | End: 2020-01-30

## 2020-01-30 RX ADMIN — ERYTHROPOIETIN 80000 UNIT(S): 10000 INJECTION, SOLUTION INTRAVENOUS; SUBCUTANEOUS at 12:39

## 2020-01-30 NOTE — HISTORY OF PRESENT ILLNESS
[de-identified] : \par  [de-identified] : She missed on appointment for procrit last week.\par She starts her next cycle on 6/25/18\par C/o back pain radiating down her left which occurred when she bent to  something.\par No change in diarrhea.\par \par 7/31/18:\par Doing well. On Revlimid for more than 2yrs, 5mg 2weeks on 1 week off. She will be starting week on tonight. \par C/o diarrhea. On Imodium 2 pills AM and 2 pills PM. Doesn't help much with diarrhea. \par C/o nausea, abd pain. \par Colonoscopy in 2016 was normal. \par Did not have blood transfusion for over 2 years. \par On procrit [id number] weekly. Missed last week on 7/24/18 as she was out of town. She has been non compliant with weekly Procrit.\par Mammogram in 2017 was normal. \par \par 9/11/18\par pt is here for follow up.\par She feels the lomotil helps with the diarrhea.\par She was away for a few weeks and missed her procrit injections.\par No fever, abdominal  discomfort has been less since since use of lomotil.\par She started a new cycle 2 days ago.\par \par 10/2/18:\par She will start Revlimid tonight (week on). 2 weeks on, 1 week off. \par On ASA 81mg. \par Denies fever, nausea, vomiting, chest pain, SOB, abdominal pain, and bladder problems.\par C/o diarrhea. \par S/p 2 units PRBC transfusion on 9/25/18. \par On Procrit 30911 units weekly. Not compliant every week. \par C/o intermittent dizziness. \par \par 10/31/18\par Pt is here for follow up.\par C/O significant fatigue.\par Continues to have diarrhea, takes imodium and lomotil as needed.\par C/o dry cough, no fever.\par Cough started a month ago. It is worse in the mornings however over last week it has been constant all day.\par No chest pain.\par She was found to have low B12 levels and was started on B12 injections.\par \par 11/27/18:\par Doing well. Hospitalized for 3 days for cellulitis/abcess of the back s/p drainage and on Abx currently. Developed 3 days after Mg infusion as per pt. \par Denies nausea, vomiting, chest pain, SOB, abdominal pain, bowel and bladder problems.\par This is the week on Revlimid (week 1).\par S/p 1 unit PRBC transfusion in the hospital. \par On Procrit weekly \par \par 12/27/18:\par Doing well. No major complaints.\par Denies fever, nausea, vomiting, chest pain, SOB, abdominal pain, and bladder problems.\par On Revlimid 5 mg 2 weeks on 1 week off. This is the week off. She will start the week on sunday (12/30/18).\par Due for Procrit 47308 units today. \par Still has diarrhea. 4-5bm/day.\par On monthly B12 injections. \par \par 1/30/19:\par Patient here for follow up for MDS.  She is taking Revlimid 5 mg, 2 weeks on, 1 week off.  She will complete this current cycle on Saturday.  She has no new complaints today.  Patient denies cough, shortness of breath, denies fever, denies bone pain.\par \par 03/04/2019\par Patient is here for a follow up visit. She complaints of severe pain in her left shoulder and has had abscess drained 2 weeks ago. However the pain is worse and she has purulent discharge on examination. She also has Nasal sores that are painful. Culture from 11/2018 showed MRSA.  Denies Fever. \par She also complaints of swelling in her right leg. Not tender and not erythematous and negative Gong;s sign. \par Her diarrhea with Revlimid is ongoing and Imodium and Lomotil helps but not everyday. \par She is on 60,000 units of procrit weekly and Revlimid 5 mg 2 weeks on 1 week off. \par \par 4/23/19\par Pt is here for follow up.\par She feels well.\par The nasal sores have improved with bactroban\par The abscess on left shoulder has resolved.\par However she has a new one on the middle of her back.\par No fever.\par Pt has been on procrit 67634 units weekly, however she missed a dose in between.\par \par 5/8/19\par pt is here for follow up.\par no new complaints.\par feels well.\par Her skin abscess has resolved.\par she has been unable to go to ID, as she could not get an appt.\par No bleeding.\par She is compliant with revlimid.\par \par 6/6/19\par Pt is here for follow up.\par no new complaints \par Feels well.\par Is on week 2 of her Revlimid cycle. \par No transfusions since last visit\par \par 7/17/19\par Pt is here for follow up.\par C/O fatigue.\par Was away for a few days two weeks ago, but missed treatment with procrit for 2 weeks.\par Is complaint with Revlimid 2 weeks on 1 week off.\par Diarrhea is a little improved.\par \par \par 8/14/19\par Patient here for follow up visit, feeling well.  Although she is complaining of some pain at the left scapula area recently.  Patient denies cough, shortness of breath, denies fever, denies other bone pain.  She is off Revlimid  this week, due to restart on Monday.  She is scheduled for Procrit and Vitamin B12 injection today.  She plans to leave the country tomorrow to visit her mother who is ill.  She will return in about 10 days.\par \par 9/11/19\par Pt is here for follow up.\par She was away for 3 weeks, out of which she took procrit for 2 weeks in [place].\par She missed last week's dose.\par She had CBCs in [place] which showed Hgb of 8.9\par She feels well.\par She still getting diarrhea.\par She has been using lomotil with very minimal relief.\par She started a new cycle of Revlimid 4 days ago.\par \par 10/3/19\par Patient here for follow up visit, feeling fairly well.   Patient denies cough, shortness of breath, denies fever, denies other bone pain.  She remains on Revlimid.  She is scheduled for Procrit and Vitamin B12 injection today.\par \par 10/24/19\par Pt is here for follow up.\par Feels well.\par No SOB, fatigue.\par Compliant with procrit and Revl;imid.\par Remains on ASa.\par Diarrhea is unchanged.\par Pt takes imodium as needed.\par \par 11/14/19\par Pt is here for follow up.\par No new complaints.\par Starts a new cycle of Revlimid today.\par Diarrhea is same as before, no rectal bleeding.\par No fever.\par \par 12/5/19\par Pt is here for follow up.\par No new complaints.\par Denies feeling weaker than usual.\par No fever, SOB.\par No change in frequency of diarrhea.\par No pain.\par Will start next cycle of Revlimid in 3 days.\par \par \par !/16/20\par Pt was recently DCed from [facility] 3 days ago after being treated for pneumonia and MARCO.\par She is on po Levaquin.\par She restarted Revlmid 3 days ago.\par She is feeling better now. No fever.\par No SOB, Chest pain and back pain has resolved.\par Pt has a cough, no expectoration.\par She also recd a unit of PRBCs last week in the hospital\par \par \par 1/30/20\par Patient here for follow up visit, feeling well.  She has no new complaints. Cough is almost gone completely.  Patient denies shortness of breath, denies fever, denies bone pain.  Her appetite is ok, weight is stable.  She is due to restart Revlimid on Monday.\par

## 2020-01-30 NOTE — ASSESSMENT
[FreeTextEntry1] : Lowrisk myelodysplastic syndrome, on Revlimid and Procrit. \par Pt was also diagnosed with B12 deficiency.\par Pt had pneumonia diagnosed few weeks ago, feeling much better.\par MARCO resolved now.\par \par PLAN:\par continue procrit 16823 units weekly.\par On  Revlimid 5 mg daily 2 weeks on 1 week off.  Encouraged her to take  Imodium and also Lomotil if diarrhea is not controlled. \par Her compliance and any side effects from such treatment were assessed at today's visit\par Side effects of  Revlimid and their management were discussed.\par Continue B12 injections monthly. \par Maintain adequate hydration.\par Repeat CXR in 3 weeks, prior to next follow up.\par RTC in 3 weeks.\par \par \par Case discussed and patient seen with Dr. Wade.\par

## 2020-01-30 NOTE — REVIEW OF SYSTEMS
[Cough] : cough [Diarrhea] : diarrhea [Negative] : Allergic/Immunologic [Shortness Of Breath] : no shortness of breath

## 2020-01-30 NOTE — PHYSICAL EXAM
[Fully active, able to carry on all pre-disease performance without restriction] : Status 0 - Fully active, able to carry on all pre-disease performance without restriction [Normal] : grossly intact [de-identified] : pharynx clear, no exudates [de-identified] : Abscess on left scapular region has improved, new abscess interscapular area. [de-identified] : abcess on the back has resolved completely, no evidence of recurrence.

## 2020-02-06 ENCOUNTER — APPOINTMENT (OUTPATIENT)
Dept: INFUSION THERAPY | Facility: CLINIC | Age: 75
End: 2020-02-06

## 2020-02-06 ENCOUNTER — LABORATORY RESULT (OUTPATIENT)
Age: 75
End: 2020-02-06

## 2020-02-06 LAB
HCT VFR BLD CALC: 20.8 %
HGB BLD-MCNC: 6.9 G/DL
MCHC RBC-ENTMCNC: 33.2 G/DL
MCHC RBC-ENTMCNC: 35.2 PG
MCV RBC AUTO: 106.1 FL
PLATELET # BLD AUTO: 486 K/UL
PMV BLD: 10.4 FL
RBC # BLD: 1.96 M/UL
RBC # FLD: 19.6 %
WBC # FLD AUTO: 4.88 K/UL

## 2020-02-06 RX ORDER — ERYTHROPOIETIN 10000 [IU]/ML
80000 INJECTION, SOLUTION INTRAVENOUS; SUBCUTANEOUS ONCE
Refills: 0 | Status: COMPLETED | OUTPATIENT
Start: 2020-02-06 | End: 2020-02-06

## 2020-02-06 RX ADMIN — ERYTHROPOIETIN 80000 UNIT(S): 10000 INJECTION, SOLUTION INTRAVENOUS; SUBCUTANEOUS at 12:01

## 2020-02-07 ENCOUNTER — APPOINTMENT (OUTPATIENT)
Dept: INFUSION THERAPY | Facility: CLINIC | Age: 75
End: 2020-02-07

## 2020-02-09 LAB
ABO + RH PNL BLD: NORMAL
BLD GP AB SCN SERPL QL: NORMAL

## 2020-02-11 ENCOUNTER — RX RENEWAL (OUTPATIENT)
Age: 75
End: 2020-02-11

## 2020-02-13 ENCOUNTER — LABORATORY RESULT (OUTPATIENT)
Age: 75
End: 2020-02-13

## 2020-02-13 ENCOUNTER — APPOINTMENT (OUTPATIENT)
Dept: INFUSION THERAPY | Facility: CLINIC | Age: 75
End: 2020-02-13

## 2020-02-13 RX ORDER — PREGABALIN 225 MG/1
1000 CAPSULE ORAL ONCE
Refills: 0 | Status: COMPLETED | OUTPATIENT
Start: 2020-02-13 | End: 2020-02-13

## 2020-02-13 RX ORDER — ERYTHROPOIETIN 10000 [IU]/ML
80000 INJECTION, SOLUTION INTRAVENOUS; SUBCUTANEOUS ONCE
Refills: 0 | Status: COMPLETED | OUTPATIENT
Start: 2020-02-13 | End: 2020-02-13

## 2020-02-13 RX ADMIN — ERYTHROPOIETIN 80000 UNIT(S): 10000 INJECTION, SOLUTION INTRAVENOUS; SUBCUTANEOUS at 12:01

## 2020-02-13 RX ADMIN — PREGABALIN 1000 MICROGRAM(S): 225 CAPSULE ORAL at 12:00

## 2020-02-14 LAB
HCT VFR BLD CALC: 26.6 %
HGB BLD-MCNC: 9 G/DL
MCHC RBC-ENTMCNC: 33.8 G/DL
MCHC RBC-ENTMCNC: 34.7 PG
MCV RBC AUTO: 102.7 FL
PLATELET # BLD AUTO: 152 K/UL
PMV BLD: 11.1 FL
RBC # BLD: 2.59 M/UL
RBC # FLD: 20.9 %
WBC # FLD AUTO: 4.85 K/UL

## 2020-02-20 ENCOUNTER — LABORATORY RESULT (OUTPATIENT)
Age: 75
End: 2020-02-20

## 2020-02-20 ENCOUNTER — APPOINTMENT (OUTPATIENT)
Dept: INFUSION THERAPY | Facility: CLINIC | Age: 75
End: 2020-02-20
Payer: MEDICARE

## 2020-02-20 ENCOUNTER — APPOINTMENT (OUTPATIENT)
Dept: HEMATOLOGY ONCOLOGY | Facility: CLINIC | Age: 75
End: 2020-02-20
Payer: MEDICARE

## 2020-02-20 VITALS
SYSTOLIC BLOOD PRESSURE: 130 MMHG | HEIGHT: 62 IN | RESPIRATION RATE: 14 BRPM | HEART RATE: 68 BPM | DIASTOLIC BLOOD PRESSURE: 60 MMHG | TEMPERATURE: 98.7 F | BODY MASS INDEX: 25.4 KG/M2 | WEIGHT: 138 LBS

## 2020-02-20 LAB
HCT VFR BLD CALC: 25 %
HGB BLD-MCNC: 8.3 G/DL
MCHC RBC-ENTMCNC: 33.2 G/DL
MCHC RBC-ENTMCNC: 34.4 PG
MCV RBC AUTO: 103.7 FL
PLATELET # BLD AUTO: 386 K/UL
PMV BLD: 9.6 FL
RBC # BLD: 2.41 M/UL
RBC # FLD: 20.1 %
WBC # FLD AUTO: 3.1 K/UL

## 2020-02-20 PROCEDURE — 99214 OFFICE O/P EST MOD 30 MIN: CPT

## 2020-02-20 RX ORDER — ERYTHROPOIETIN 10000 [IU]/ML
80000 INJECTION, SOLUTION INTRAVENOUS; SUBCUTANEOUS ONCE
Refills: 0 | Status: COMPLETED | OUTPATIENT
Start: 2020-02-20 | End: 2020-02-20

## 2020-02-20 RX ADMIN — ERYTHROPOIETIN 80000 UNIT(S): 10000 INJECTION, SOLUTION INTRAVENOUS; SUBCUTANEOUS at 15:13

## 2020-02-21 NOTE — END OF VISIT
[FreeTextEntry3] : I was present with the PA during the key portions of the history and exam. I agree with the findings and plan as documented in the PA's I was physically present for the key portions of the evaluation and management service provided.  I agree with the history and physical, and plan which I have reviewed and edited where appropriate.\par

## 2020-02-21 NOTE — PHYSICAL EXAM
[Fully active, able to carry on all pre-disease performance without restriction] : Status 0 - Fully active, able to carry on all pre-disease performance without restriction [Normal] : normal appearance, no rash, nodules, vesicles, ulcers, erythema [de-identified] : CTA

## 2020-02-21 NOTE — HISTORY OF PRESENT ILLNESS
[de-identified] : She missed on appointment for procrit last week.\par She starts her next cycle on 6/25/18\par C/o back pain radiating down her left which occurred when she bent to  something.\par No change in diarrhea.\par \par 7/31/18:\par Doing well. On Revlimid for more than 2yrs, 5mg 2weeks on 1 week off. She will be starting week on tonight. \par C/o diarrhea. On Imodium 2 pills AM and 2 pills PM. Doesn't help much with diarrhea. \par C/o nausea, abd pain. \par Colonoscopy in 2016 was normal. \par Did not have blood transfusion for over 2 years. \par On procrit [id number] weekly. Missed last week on 7/24/18 as she was out of town. She has been non compliant with weekly Procrit.\par Mammogram in 2017 was normal. \par \par 9/11/18\par pt is here for follow up.\par She feels the lomotil helps with the diarrhea.\par She was away for a few weeks and missed her procrit injections.\par No fever, abdominal  discomfort has been less since since use of lomotil.\par She started a new cycle 2 days ago.\par \par 10/2/18:\par She will start Revlimid tonight (week on). 2 weeks on, 1 week off. \par On ASA 81mg. \par Denies fever, nausea, vomiting, chest pain, SOB, abdominal pain, and bladder problems.\par C/o diarrhea. \par S/p 2 units PRBC transfusion on 9/25/18. \par On Procrit 03589 units weekly. Not compliant every week. \par C/o intermittent dizziness. \par \par 10/31/18\par Pt is here for follow up.\par C/O significant fatigue.\par Continues to have diarrhea, takes imodium and lomotil as needed.\par C/o dry cough, no fever.\par Cough started a month ago. It is worse in the mornings however over last week it has been constant all day.\par No chest pain.\par She was found to have low B12 levels and was started on B12 injections.\par \par 11/27/18:\par Doing well. Hospitalized for 3 days for cellulitis/abcess of the back s/p drainage and on Abx currently. Developed 3 days after Mg infusion as per pt. \par Denies nausea, vomiting, chest pain, SOB, abdominal pain, bowel and bladder problems.\par This is the week on Revlimid (week 1).\par S/p 1 unit PRBC transfusion in the hospital. \par On Procrit weekly \par \par 12/27/18:\par Doing well. No major complaints.\par Denies fever, nausea, vomiting, chest pain, SOB, abdominal pain, and bladder problems.\par On Revlimid 5 mg 2 weeks on 1 week off. This is the week off. She will start the week on sunday (12/30/18).\par Due for Procrit 03149 units today. \par Still has diarrhea. 4-5bm/day.\par On monthly B12 injections. \par \par 1/30/19:\par Patient here for follow up for MDS.  She is taking Revlimid 5 mg, 2 weeks on, 1 week off.  She will complete this current cycle on Saturday.  She has no new complaints today.  Patient denies cough, shortness of breath, denies fever, denies bone pain.\par \par 03/04/2019\par Patient is here for a follow up visit. She complaints of severe pain in her left shoulder and has had abscess drained 2 weeks ago. However the pain is worse and she has purulent discharge on examination. She also has Nasal sores that are painful. Culture from 11/2018 showed MRSA.  Denies Fever. \par She also complaints of swelling in her right leg. Not tender and not erythematous and negative Gong;s sign. \par Her diarrhea with Revlimid is ongoing and Imodium and Lomotil helps but not everyday. \par She is on 60,000 units of procrit weekly and Revlimid 5 mg 2 weeks on 1 week off. \par \par 4/23/19\par Pt is here for follow up.\par She feels well.\par The nasal sores have improved with bactroban\par The abscess on left shoulder has resolved.\par However she has a new one on the middle of her back.\par No fever.\par Pt has been on procrit 05498 units weekly, however she missed a dose in between.\par \par 5/8/19\par pt is here for follow up.\par no new complaints.\par feels well.\par Her skin abscess has resolved.\par she has been unable to go to ID, as she could not get an appt.\par No bleeding.\par She is compliant with revlimid.\par \par 6/6/19\par Pt is here for follow up.\par no new complaints \par Feels well.\par Is on week 2 of her Revlimid cycle. \par No transfusions since last visit\par \par 7/17/19\par Pt is here for follow up.\par C/O fatigue.\par Was away for a few days two weeks ago, but missed treatment with procrit for 2 weeks.\par Is complaint with Revlimid 2 weeks on 1 week off.\par Diarrhea is a little improved.\par \par \par 8/14/19\par Patient here for follow up visit, feeling well.  Although she is complaining of some pain at the left scapula area recently.  Patient denies cough, shortness of breath, denies fever, denies other bone pain.  She is off Revlimid  this week, due to restart on Monday.  She is scheduled for Procrit and Vitamin B12 injection today.  She plans to leave the country tomorrow to visit her mother who is ill.  She will return in about 10 days.\par \par 9/11/19\par Pt is here for follow up.\par She was away for 3 weeks, out of which she took procrit for 2 weeks in [place].\par She missed last week's dose.\par She had CBCs in [place] which showed Hgb of 8.9\par She feels well.\par She still getting diarrhea.\par She has been using lomotil with very minimal relief.\par She started a new cycle of Revlimid 4 days ago.\par \par 10/3/19\par Patient here for follow up visit, feeling fairly well.   Patient denies cough, shortness of breath, denies fever, denies other bone pain.  She remains on Revlimid.  She is scheduled for Procrit and Vitamin B12 injection today.\par \par 10/24/19\par Pt is here for follow up.\par Feels well.\par No SOB, fatigue.\par Compliant with procrit and Revl;imid.\par Remains on ASa.\par Diarrhea is unchanged.\par Pt takes imodium as needed.\par \par 11/14/19\par Pt is here for follow up.\par No new complaints.\par Starts a new cycle of Revlimid today.\par Diarrhea is same as before, no rectal bleeding.\par No fever.\par \par 12/5/19\par Pt is here for follow up.\par No new complaints.\par Denies feeling weaker than usual.\par No fever, SOB.\par No change in frequency of diarrhea.\par No pain.\par Will start next cycle of Revlimid in 3 days.\par \par \par !/16/20\par Pt was recently DCed from [facility] 3 days ago after being treated for pneumonia and MARCO.\par She is on po Levaquin.\par She restarted Revlmid 3 days ago.\par She is feeling better now. No fever.\par No SOB, Chest pain and back pain has resolved.\par Pt has a cough, no expectoration.\par She also recd a unit of PRBCs last week in the hospital\par \par \par 1/30/20\par Patient here for follow up visit, feeling well.  She has no new complaints. Cough is almost gone completely.  Patient denies shortness of breath, denies fever, denies bone pain.  Her appetite is ok, weight is stable.  She is due to restart Revlimid on Monday.\par \par 02/20/20\par Pt is here for follow up, feeling well.\par Offers no new complaints. Denies SOB, fever, chills, weight loss, CP, cough. \par Currently off week of Revlimid 5 mg. Restarts on Monday.\par Has procrit 80,000 units today. Next b 12 injection due in 2 weeks \par Did not get chest Xray\par CBC reviewed WBC 3.1, h/h 8.3/25%, plt 386, neutrophils 1.29 [de-identified] : \par

## 2020-02-21 NOTE — ASSESSMENT
[FreeTextEntry1] : Lowrisk myelodysplastic syndrome, on Revlimid and Procrit. \par Pt was also diagnosed with B12 deficiency.\par Pt had pneumonia diagnosed few weeks ago, feeling much better.\par MARCO resolved now.\par \par PLAN:\par Will take Revlimid 5 mg daily until next visit in 3 weeks, advised patient not to take Revlimid the day of follow up visit. Pt verbalized understanding. Encouraged her to take  Imodium and also Lomotil if diarrhea is not controlled. \par Her compliance and any side effects from such treatment were assessed at today's visit\par Side effects of  Revlimid and their management were discussed.\par \par Will check CBC weekly \par \par Continue procrit 80,000 units weekly.\par \par Continue B12 injections monthly. \par \par Maintain adequate hydration.\par \par Repeat CXR- pt has script in system will go after today's visit. \par \par RTC in 3 weeks.\par \par \par Case discussed and patient seen with Dr. Wade.\par

## 2020-02-27 ENCOUNTER — APPOINTMENT (OUTPATIENT)
Dept: INFUSION THERAPY | Facility: CLINIC | Age: 75
End: 2020-02-27

## 2020-02-27 ENCOUNTER — LABORATORY RESULT (OUTPATIENT)
Age: 75
End: 2020-02-27

## 2020-02-27 VITALS
HEART RATE: 97 BPM | RESPIRATION RATE: 18 BRPM | SYSTOLIC BLOOD PRESSURE: 122 MMHG | TEMPERATURE: 97.8 F | DIASTOLIC BLOOD PRESSURE: 58 MMHG

## 2020-02-27 RX ORDER — ERYTHROPOIETIN 10000 [IU]/ML
80000 INJECTION, SOLUTION INTRAVENOUS; SUBCUTANEOUS ONCE
Refills: 0 | Status: COMPLETED | OUTPATIENT
Start: 2020-02-27 | End: 2020-02-27

## 2020-02-27 RX ADMIN — ERYTHROPOIETIN 80000 UNIT(S): 10000 INJECTION, SOLUTION INTRAVENOUS; SUBCUTANEOUS at 12:59

## 2020-02-29 LAB
HCT VFR BLD CALC: 22.6 %
HGB BLD-MCNC: 7.7 G/DL
MCHC RBC-ENTMCNC: 34.1 G/DL
MCHC RBC-ENTMCNC: 35.3 PG
MCV RBC AUTO: 103.7 FL
PLATELET # BLD AUTO: 365 K/UL
PMV BLD: 10.7 FL
RBC # BLD: 2.18 M/UL
RBC # FLD: 20.3 %
WBC # FLD AUTO: 4.8 K/UL

## 2020-03-05 ENCOUNTER — APPOINTMENT (OUTPATIENT)
Dept: INFUSION THERAPY | Facility: CLINIC | Age: 75
End: 2020-03-05

## 2020-03-05 ENCOUNTER — LABORATORY RESULT (OUTPATIENT)
Age: 75
End: 2020-03-05

## 2020-03-05 LAB
HCT VFR BLD CALC: 22.2 %
HGB BLD-MCNC: 7.3 G/DL
MCHC RBC-ENTMCNC: 32.9 G/DL
MCHC RBC-ENTMCNC: 35.1 PG
MCV RBC AUTO: 106.7 FL
PLATELET # BLD AUTO: 342 K/UL
PMV BLD: 10.6 FL
RBC # BLD: 2.08 M/UL
RBC # FLD: 21.2 %
WBC # FLD AUTO: 6.14 K/UL

## 2020-03-05 RX ORDER — ERYTHROPOIETIN 10000 [IU]/ML
80000 INJECTION, SOLUTION INTRAVENOUS; SUBCUTANEOUS ONCE
Refills: 0 | Status: COMPLETED | OUTPATIENT
Start: 2020-03-05 | End: 2020-03-05

## 2020-03-05 RX ADMIN — ERYTHROPOIETIN 80000 UNIT(S): 10000 INJECTION, SOLUTION INTRAVENOUS; SUBCUTANEOUS at 12:35

## 2020-03-12 ENCOUNTER — APPOINTMENT (OUTPATIENT)
Dept: HEMATOLOGY ONCOLOGY | Facility: CLINIC | Age: 75
End: 2020-03-12
Payer: MEDICARE

## 2020-03-12 ENCOUNTER — LABORATORY RESULT (OUTPATIENT)
Age: 75
End: 2020-03-12

## 2020-03-12 ENCOUNTER — APPOINTMENT (OUTPATIENT)
Dept: INFUSION THERAPY | Facility: CLINIC | Age: 75
End: 2020-03-12
Payer: MEDICARE

## 2020-03-12 VITALS
HEART RATE: 93 BPM | HEIGHT: 62 IN | DIASTOLIC BLOOD PRESSURE: 53 MMHG | TEMPERATURE: 97.7 F | WEIGHT: 143 LBS | BODY MASS INDEX: 26.31 KG/M2 | SYSTOLIC BLOOD PRESSURE: 123 MMHG

## 2020-03-12 LAB
HCT VFR BLD CALC: 20.8 %
HGB BLD-MCNC: 7.1 G/DL
MCHC RBC-ENTMCNC: 34.1 G/DL
MCHC RBC-ENTMCNC: 36 PG
MCV RBC AUTO: 105.6 FL
PLATELET # BLD AUTO: 295 K/UL
PMV BLD: 9.7 FL
RBC # BLD: 1.97 M/UL
RBC # FLD: 20.4 %
WBC # FLD AUTO: 3.84 K/UL

## 2020-03-12 PROCEDURE — 99215 OFFICE O/P EST HI 40 MIN: CPT

## 2020-03-12 RX ORDER — ERYTHROPOIETIN 10000 [IU]/ML
80000 INJECTION, SOLUTION INTRAVENOUS; SUBCUTANEOUS ONCE
Refills: 0 | Status: COMPLETED | OUTPATIENT
Start: 2020-03-12 | End: 2020-03-12

## 2020-03-12 RX ADMIN — ERYTHROPOIETIN 80000 UNIT(S): 10000 INJECTION, SOLUTION INTRAVENOUS; SUBCUTANEOUS at 11:59

## 2020-03-12 NOTE — PHYSICAL EXAM
[Fully active, able to carry on all pre-disease performance without restriction] : Status 0 - Fully active, able to carry on all pre-disease performance without restriction [Normal] : grossly intact [de-identified] : CTA

## 2020-03-12 NOTE — HISTORY OF PRESENT ILLNESS
[de-identified] : She missed on appointment for procrit last week.\par She starts her next cycle on 6/25/18\par C/o back pain radiating down her left which occurred when she bent to  something.\par No change in diarrhea.\par \par 7/31/18:\par Doing well. On Revlimid for more than 2yrs, 5mg 2weeks on 1 week off. She will be starting week on tonight. \par C/o diarrhea. On Imodium 2 pills AM and 2 pills PM. Doesn't help much with diarrhea. \par C/o nausea, abd pain. \par Colonoscopy in 2016 was normal. \par Did not have blood transfusion for over 2 years. \par On procrit [id number] weekly. Missed last week on 7/24/18 as she was out of town. She has been non compliant with weekly Procrit.\par Mammogram in 2017 was normal. \par \par 9/11/18\par pt is here for follow up.\par She feels the lomotil helps with the diarrhea.\par She was away for a few weeks and missed her procrit injections.\par No fever, abdominal  discomfort has been less since since use of lomotil.\par She started a new cycle 2 days ago.\par \par 10/2/18:\par She will start Revlimid tonight (week on). 2 weeks on, 1 week off. \par On ASA 81mg. \par Denies fever, nausea, vomiting, chest pain, SOB, abdominal pain, and bladder problems.\par C/o diarrhea. \par S/p 2 units PRBC transfusion on 9/25/18. \par On Procrit 93915 units weekly. Not compliant every week. \par C/o intermittent dizziness. \par \par 10/31/18\par Pt is here for follow up.\par C/O significant fatigue.\par Continues to have diarrhea, takes imodium and lomotil as needed.\par C/o dry cough, no fever.\par Cough started a month ago. It is worse in the mornings however over last week it has been constant all day.\par No chest pain.\par She was found to have low B12 levels and was started on B12 injections.\par \par 11/27/18:\par Doing well. Hospitalized for 3 days for cellulitis/abcess of the back s/p drainage and on Abx currently. Developed 3 days after Mg infusion as per pt. \par Denies nausea, vomiting, chest pain, SOB, abdominal pain, bowel and bladder problems.\par This is the week on Revlimid (week 1).\par S/p 1 unit PRBC transfusion in the hospital. \par On Procrit weekly \par \par 12/27/18:\par Doing well. No major complaints.\par Denies fever, nausea, vomiting, chest pain, SOB, abdominal pain, and bladder problems.\par On Revlimid 5 mg 2 weeks on 1 week off. This is the week off. She will start the week on sunday (12/30/18).\par Due for Procrit 88505 units today. \par Still has diarrhea. 4-5bm/day.\par On monthly B12 injections. \par \par 1/30/19:\par Patient here for follow up for MDS.  She is taking Revlimid 5 mg, 2 weeks on, 1 week off.  She will complete this current cycle on Saturday.  She has no new complaints today.  Patient denies cough, shortness of breath, denies fever, denies bone pain.\par \par 03/04/2019\par Patient is here for a follow up visit. She complaints of severe pain in her left shoulder and has had abscess drained 2 weeks ago. However the pain is worse and she has purulent discharge on examination. She also has Nasal sores that are painful. Culture from 11/2018 showed MRSA.  Denies Fever. \par She also complaints of swelling in her right leg. Not tender and not erythematous and negative Gong;s sign. \par Her diarrhea with Revlimid is ongoing and Imodium and Lomotil helps but not everyday. \par She is on 60,000 units of procrit weekly and Revlimid 5 mg 2 weeks on 1 week off. \par \par 4/23/19\par Pt is here for follow up.\par She feels well.\par The nasal sores have improved with bactroban\par The abscess on left shoulder has resolved.\par However she has a new one on the middle of her back.\par No fever.\par Pt has been on procrit 35295 units weekly, however she missed a dose in between.\par \par 5/8/19\par pt is here for follow up.\par no new complaints.\par feels well.\par Her skin abscess has resolved.\par she has been unable to go to ID, as she could not get an appt.\par No bleeding.\par She is compliant with revlimid.\par \par 6/6/19\par Pt is here for follow up.\par no new complaints \par Feels well.\par Is on week 2 of her Revlimid cycle. \par No transfusions since last visit\par \par 7/17/19\par Pt is here for follow up.\par C/O fatigue.\par Was away for a few days two weeks ago, but missed treatment with procrit for 2 weeks.\par Is complaint with Revlimid 2 weeks on 1 week off.\par Diarrhea is a little improved.\par \par \par 8/14/19\par Patient here for follow up visit, feeling well.  Although she is complaining of some pain at the left scapula area recently.  Patient denies cough, shortness of breath, denies fever, denies other bone pain.  She is off Revlimid  this week, due to restart on Monday.  She is scheduled for Procrit and Vitamin B12 injection today.  She plans to leave the country tomorrow to visit her mother who is ill.  She will return in about 10 days.\par \par 9/11/19\par Pt is here for follow up.\par She was away for 3 weeks, out of which she took procrit for 2 weeks in [place].\par She missed last week's dose.\par She had CBCs in [place] which showed Hgb of 8.9\par She feels well.\par She still getting diarrhea.\par She has been using lomotil with very minimal relief.\par She started a new cycle of Revlimid 4 days ago.\par \par 10/3/19\par Patient here for follow up visit, feeling fairly well.   Patient denies cough, shortness of breath, denies fever, denies other bone pain.  She remains on Revlimid.  She is scheduled for Procrit and Vitamin B12 injection today.\par \par 10/24/19\par Pt is here for follow up.\par Feels well.\par No SOB, fatigue.\par Compliant with procrit and Revl;imid.\par Remains on ASa.\par Diarrhea is unchanged.\par Pt takes imodium as needed.\par \par 11/14/19\par Pt is here for follow up.\par No new complaints.\par Starts a new cycle of Revlimid today.\par Diarrhea is same as before, no rectal bleeding.\par No fever.\par \par 12/5/19\par Pt is here for follow up.\par No new complaints.\par Denies feeling weaker than usual.\par No fever, SOB.\par No change in frequency of diarrhea.\par No pain.\par Will start next cycle of Revlimid in 3 days.\par \par \par !/16/20\par Pt was recently DCed from [facility] 3 days ago after being treated for pneumonia and MARCO.\par She is on po Levaquin.\par She restarted Revlmid 3 days ago.\par She is feeling better now. No fever.\par No SOB, Chest pain and back pain has resolved.\par Pt has a cough, no expectoration.\par She also recd a unit of PRBCs last week in the hospital\par \par \par 1/30/20\par Patient here for follow up visit, feeling well.  She has no new complaints. Cough is almost gone completely.  Patient denies shortness of breath, denies fever, denies bone pain.  Her appetite is ok, weight is stable.  She is due to restart Revlimid on Monday.\par \par 02/20/20\par Pt is here for follow up, feeling well.\par Offers no new complaints. Denies SOB, fever, chills, weight loss, CP, cough. \par Currently off week of Revlimid 5 mg. Restarts on Monday.\par Has procrit 80,000 units today. Next b 12 injection due in 2 weeks \par Did not get chest Xray\par CBC reviewed WBC 3.1, h/h 8.3/25%, plt 386, neutrophils 1.29\par \par 3/12/20\par Pt is here for follow up.\par She took Revlimid for 2 weeks and then has been off for one week although she was advised to take it daily without break.\par No SOB, Cough, fever.\par Has mild fatigue. [de-identified] : \par

## 2020-03-12 NOTE — ASSESSMENT
[FreeTextEntry1] : Lowrisk myelodysplastic syndrome, on Revlimid and Procrit. \par Pt was also diagnosed with B12 deficiency.\par \par PLAN:\par Will take Revlimid 5 mg daily until next visit in 3 weeks, advised patient again not to take Revlimid the day of follow up visit. Pt verbalized understanding. Encouraged her to take  Imodium and also Lomotil if diarrhea is not controlled. \par Her compliance and any side effects from such treatment were assessed at today's visit\par Side effects of  Revlimid and their management were discussed.\par \par Will check CBC weekly \par \par Continue procrit 80,000 units weekly.\par \par Continue B12 injections monthly. \par \par Maintain adequate hydration.\par \par \par RTC in 3 weeks.\par \par

## 2020-03-19 ENCOUNTER — APPOINTMENT (OUTPATIENT)
Dept: INFUSION THERAPY | Facility: CLINIC | Age: 75
End: 2020-03-19

## 2020-03-19 ENCOUNTER — LABORATORY RESULT (OUTPATIENT)
Age: 75
End: 2020-03-19

## 2020-03-19 LAB
ABO + RH PNL BLD: NORMAL
ALBUMIN SERPL ELPH-MCNC: 4.5 G/DL
ALP BLD-CCNC: 78 U/L
ALT SERPL-CCNC: 9 U/L
ANION GAP SERPL CALC-SCNC: 16 MMOL/L
AST SERPL-CCNC: 9 U/L
BILIRUB SERPL-MCNC: 0.5 MG/DL
BLD GP AB SCN SERPL QL: NORMAL
BUN SERPL-MCNC: 26 MG/DL
CALCIUM SERPL-MCNC: 9.2 MG/DL
CHLORIDE SERPL-SCNC: 99 MMOL/L
CO2 SERPL-SCNC: 20 MMOL/L
CREAT SERPL-MCNC: 1.6 MG/DL
GLUCOSE SERPL-MCNC: 303 MG/DL
HCT VFR BLD CALC: 20.1 %
HGB BLD-MCNC: 6.6 G/DL
MAGNESIUM SERPL-MCNC: 1.9 MG/DL
MCHC RBC-ENTMCNC: 32.8 G/DL
MCHC RBC-ENTMCNC: 36.1 PG
MCV RBC AUTO: 109.8 FL
PLATELET # BLD AUTO: 363 K/UL
PMV BLD: 10 FL
POTASSIUM SERPL-SCNC: 5.4 MMOL/L
PROT SERPL-MCNC: 6.5 G/DL
RBC # BLD: 1.83 M/UL
RBC # FLD: 20.6 %
SODIUM SERPL-SCNC: 135 MMOL/L
WBC # FLD AUTO: 2.66 K/UL

## 2020-03-19 RX ORDER — ERYTHROPOIETIN 10000 [IU]/ML
80000 INJECTION, SOLUTION INTRAVENOUS; SUBCUTANEOUS ONCE
Refills: 0 | Status: COMPLETED | OUTPATIENT
Start: 2020-03-19 | End: 2020-03-19

## 2020-03-19 RX ORDER — PREGABALIN 225 MG/1
1000 CAPSULE ORAL ONCE
Refills: 0 | Status: COMPLETED | OUTPATIENT
Start: 2020-03-19 | End: 2020-03-19

## 2020-03-19 RX ADMIN — ERYTHROPOIETIN 80000 UNIT(S): 10000 INJECTION, SOLUTION INTRAVENOUS; SUBCUTANEOUS at 10:56

## 2020-03-19 RX ADMIN — PREGABALIN 1000 MICROGRAM(S): 225 CAPSULE ORAL at 10:56

## 2020-03-20 ENCOUNTER — APPOINTMENT (OUTPATIENT)
Dept: INFUSION THERAPY | Facility: CLINIC | Age: 75
End: 2020-03-20

## 2020-03-26 ENCOUNTER — APPOINTMENT (OUTPATIENT)
Dept: INFUSION THERAPY | Facility: CLINIC | Age: 75
End: 2020-03-26

## 2020-03-26 ENCOUNTER — LABORATORY RESULT (OUTPATIENT)
Age: 75
End: 2020-03-26

## 2020-03-26 LAB
HCT VFR BLD CALC: 27.6 %
HGB BLD-MCNC: 8.9 G/DL
MCHC RBC-ENTMCNC: 32.2 G/DL
MCHC RBC-ENTMCNC: 35 PG
MCV RBC AUTO: 108.7 FL
PLATELET # BLD AUTO: 365 K/UL
PMV BLD: 11 FL
RBC # BLD: 2.54 M/UL
RBC # FLD: 20.9 %
WBC # FLD AUTO: 6.33 K/UL

## 2020-03-26 RX ORDER — ERYTHROPOIETIN 10000 [IU]/ML
80000 INJECTION, SOLUTION INTRAVENOUS; SUBCUTANEOUS ONCE
Refills: 0 | Status: COMPLETED | OUTPATIENT
Start: 2020-03-26 | End: 2020-03-26

## 2020-03-26 RX ADMIN — ERYTHROPOIETIN 80000 UNIT(S): 10000 INJECTION, SOLUTION INTRAVENOUS; SUBCUTANEOUS at 11:50

## 2020-04-02 ENCOUNTER — APPOINTMENT (OUTPATIENT)
Dept: HEMATOLOGY ONCOLOGY | Facility: CLINIC | Age: 75
End: 2020-04-02
Payer: MEDICARE

## 2020-04-02 ENCOUNTER — LABORATORY RESULT (OUTPATIENT)
Age: 75
End: 2020-04-02

## 2020-04-02 ENCOUNTER — APPOINTMENT (OUTPATIENT)
Dept: INFUSION THERAPY | Facility: CLINIC | Age: 75
End: 2020-04-02
Payer: MEDICARE

## 2020-04-02 VITALS
SYSTOLIC BLOOD PRESSURE: 129 MMHG | HEIGHT: 62 IN | TEMPERATURE: 98.4 F | RESPIRATION RATE: 14 BRPM | DIASTOLIC BLOOD PRESSURE: 56 MMHG | WEIGHT: 141 LBS | HEART RATE: 84 BPM | BODY MASS INDEX: 25.95 KG/M2

## 2020-04-02 LAB
HCT VFR BLD CALC: 25.2 %
HGB BLD-MCNC: 8.1 G/DL
MCHC RBC-ENTMCNC: 32.1 G/DL
MCHC RBC-ENTMCNC: 35.1 PG
MCV RBC AUTO: 109.1 FL
PLATELET # BLD AUTO: 72 K/UL
PMV BLD: 11.4 FL
RBC # BLD: 2.31 M/UL
RBC # FLD: 19.9 %
WBC # FLD AUTO: 3.26 K/UL

## 2020-04-02 PROCEDURE — 99214 OFFICE O/P EST MOD 30 MIN: CPT

## 2020-04-02 RX ORDER — ERYTHROPOIETIN 10000 [IU]/ML
80000 INJECTION, SOLUTION INTRAVENOUS; SUBCUTANEOUS ONCE
Refills: 0 | Status: COMPLETED | OUTPATIENT
Start: 2020-04-02 | End: 2020-04-02

## 2020-04-02 RX ORDER — PREGABALIN 225 MG/1
1000 CAPSULE ORAL ONCE
Refills: 0 | Status: COMPLETED | OUTPATIENT
Start: 2020-04-02 | End: 2020-04-02

## 2020-04-02 RX ADMIN — ERYTHROPOIETIN 80000 UNIT(S): 10000 INJECTION, SOLUTION INTRAVENOUS; SUBCUTANEOUS at 12:01

## 2020-04-02 RX ADMIN — PREGABALIN 1000 MICROGRAM(S): 225 CAPSULE ORAL at 12:00

## 2020-04-03 NOTE — HISTORY OF PRESENT ILLNESS
[de-identified] : She missed on appointment for procrit last week.\par She starts her next cycle on 6/25/18\par C/o back pain radiating down her left which occurred when she bent to  something.\par No change in diarrhea.\par \par 7/31/18:\par Doing well. On Revlimid for more than 2yrs, 5mg 2weeks on 1 week off. She will be starting week on tonight. \par C/o diarrhea. On Imodium 2 pills AM and 2 pills PM. Doesn't help much with diarrhea. \par C/o nausea, abd pain. \par Colonoscopy in 2016 was normal. \par Did not have blood transfusion for over 2 years. \par On procrit [id number] weekly. Missed last week on 7/24/18 as she was out of town. She has been non compliant with weekly Procrit.\par Mammogram in 2017 was normal. \par \par 9/11/18\par pt is here for follow up.\par She feels the lomotil helps with the diarrhea.\par She was away for a few weeks and missed her procrit injections.\par No fever, abdominal  discomfort has been less since since use of lomotil.\par She started a new cycle 2 days ago.\par \par 10/2/18:\par She will start Revlimid tonight (week on). 2 weeks on, 1 week off. \par On ASA 81mg. \par Denies fever, nausea, vomiting, chest pain, SOB, abdominal pain, and bladder problems.\par C/o diarrhea. \par S/p 2 units PRBC transfusion on 9/25/18. \par On Procrit 09835 units weekly. Not compliant every week. \par C/o intermittent dizziness. \par \par 10/31/18\par Pt is here for follow up.\par C/O significant fatigue.\par Continues to have diarrhea, takes imodium and lomotil as needed.\par C/o dry cough, no fever.\par Cough started a month ago. It is worse in the mornings however over last week it has been constant all day.\par No chest pain.\par She was found to have low B12 levels and was started on B12 injections.\par \par 11/27/18:\par Doing well. Hospitalized for 3 days for cellulitis/abcess of the back s/p drainage and on Abx currently. Developed 3 days after Mg infusion as per pt. \par Denies nausea, vomiting, chest pain, SOB, abdominal pain, bowel and bladder problems.\par This is the week on Revlimid (week 1).\par S/p 1 unit PRBC transfusion in the hospital. \par On Procrit weekly \par \par 12/27/18:\par Doing well. No major complaints.\par Denies fever, nausea, vomiting, chest pain, SOB, abdominal pain, and bladder problems.\par On Revlimid 5 mg 2 weeks on 1 week off. This is the week off. She will start the week on sunday (12/30/18).\par Due for Procrit 27608 units today. \par Still has diarrhea. 4-5bm/day.\par On monthly B12 injections. \par \par 1/30/19:\par Patient here for follow up for MDS.  She is taking Revlimid 5 mg, 2 weeks on, 1 week off.  She will complete this current cycle on Saturday.  She has no new complaints today.  Patient denies cough, shortness of breath, denies fever, denies bone pain.\par \par 03/04/2019\par Patient is here for a follow up visit. She complaints of severe pain in her left shoulder and has had abscess drained 2 weeks ago. However the pain is worse and she has purulent discharge on examination. She also has Nasal sores that are painful. Culture from 11/2018 showed MRSA.  Denies Fever. \par She also complaints of swelling in her right leg. Not tender and not erythematous and negative Gong;s sign. \par Her diarrhea with Revlimid is ongoing and Imodium and Lomotil helps but not everyday. \par She is on 60,000 units of procrit weekly and Revlimid 5 mg 2 weeks on 1 week off. \par \par 4/23/19\par Pt is here for follow up.\par She feels well.\par The nasal sores have improved with bactroban\par The abscess on left shoulder has resolved.\par However she has a new one on the middle of her back.\par No fever.\par Pt has been on procrit 48206 units weekly, however she missed a dose in between.\par \par 5/8/19\par pt is here for follow up.\par no new complaints.\par feels well.\par Her skin abscess has resolved.\par she has been unable to go to ID, as she could not get an appt.\par No bleeding.\par She is compliant with revlimid.\par \par 6/6/19\par Pt is here for follow up.\par no new complaints \par Feels well.\par Is on week 2 of her Revlimid cycle. \par No transfusions since last visit\par \par 7/17/19\par Pt is here for follow up.\par C/O fatigue.\par Was away for a few days two weeks ago, but missed treatment with procrit for 2 weeks.\par Is complaint with Revlimid 2 weeks on 1 week off.\par Diarrhea is a little improved.\par \par \par 8/14/19\par Patient here for follow up visit, feeling well.  Although she is complaining of some pain at the left scapula area recently.  Patient denies cough, shortness of breath, denies fever, denies other bone pain.  She is off Revlimid  this week, due to restart on Monday.  She is scheduled for Procrit and Vitamin B12 injection today.  She plans to leave the country tomorrow to visit her mother who is ill.  She will return in about 10 days.\par \par 9/11/19\par Pt is here for follow up.\par She was away for 3 weeks, out of which she took procrit for 2 weeks in [place].\par She missed last week's dose.\par She had CBCs in [place] which showed Hgb of 8.9\par She feels well.\par She still getting diarrhea.\par She has been using lomotil with very minimal relief.\par She started a new cycle of Revlimid 4 days ago.\par \par 10/3/19\par Patient here for follow up visit, feeling fairly well.   Patient denies cough, shortness of breath, denies fever, denies other bone pain.  She remains on Revlimid.  She is scheduled for Procrit and Vitamin B12 injection today.\par \par 10/24/19\par Pt is here for follow up.\par Feels well.\par No SOB, fatigue.\par Compliant with procrit and Revl;imid.\par Remains on ASa.\par Diarrhea is unchanged.\par Pt takes imodium as needed.\par \par 11/14/19\par Pt is here for follow up.\par No new complaints.\par Starts a new cycle of Revlimid today.\par Diarrhea is same as before, no rectal bleeding.\par No fever.\par \par 12/5/19\par Pt is here for follow up.\par No new complaints.\par Denies feeling weaker than usual.\par No fever, SOB.\par No change in frequency of diarrhea.\par No pain.\par Will start next cycle of Revlimid in 3 days.\par \par \par !/16/20\par Pt was recently DCed from [facility] 3 days ago after being treated for pneumonia and MARCO.\par She is on po Levaquin.\par She restarted Revlmid 3 days ago.\par She is feeling better now. No fever.\par No SOB, Chest pain and back pain has resolved.\par Pt has a cough, no expectoration.\par She also recd a unit of PRBCs last week in the hospital\par \par \par 1/30/20\par Patient here for follow up visit, feeling well.  She has no new complaints. Cough is almost gone completely.  Patient denies shortness of breath, denies fever, denies bone pain.  Her appetite is ok, weight is stable.  She is due to restart Revlimid on Monday.\par \par 02/20/20\par Pt is here for follow up, feeling well.\par Offers no new complaints. Denies SOB, fever, chills, weight loss, CP, cough. \par Currently off week of Revlimid 5 mg. Restarts on Monday.\par Has procrit 80,000 units today. Next b 12 injection due in 2 weeks \par Did not get chest Xray\par CBC reviewed WBC 3.1, h/h 8.3/25%, plt 386, neutrophils 1.29\par \par 3/12/20\par Pt is here for follow up.\par She took Revlimid for 2 weeks and then has been off for one week although she was advised to take it daily without break.\par No SOB, Cough, fever.\par Has mild fatigue.\par \par 04/02/2020\par SIMONA KUHN a 75 year F is here today for follow up visit of MDS.\par Denies fever, chills, cough,night sweats, weight loss. \par Denies bleeding or bruising.\par Pt receives procrit 80,000 units weekly and B12 IM monthly. \par Continued Revlimid 5 mg daily x 3 weeks, has 1 dose left tonight. \par CBC reviewed: WBC 3.2, H/H 8.1/25.2, neutrophils 1.6, PLT 72\par \par  [de-identified] : \par

## 2020-04-03 NOTE — PHYSICAL EXAM
[Fully active, able to carry on all pre-disease performance without restriction] : Status 0 - Fully active, able to carry on all pre-disease performance without restriction [Normal] : grossly intact [de-identified] : CTA

## 2020-04-03 NOTE — ASSESSMENT
[FreeTextEntry1] : Lowrisk myelodysplastic syndrome, on Revlimid and Procrit. \par Pt was also diagnosed with B12 deficiency.\par \par PLAN:\par Will hold Revlimid x 2 days - will resume Revlimid 5 mg daily x 21 days and will repet CBC in 3 weeks.  \par Pt verbalized understanding. \par Encouraged her to take  Imodium and also Lomotil if diarrhea is not controlled. \par Her compliance and any side effects from such treatment were assessed at today's visit\par Side effects of  Revlimid and their management were discussed.\par \par Will check CBC weekly \par \par Continue procrit 80,000 units weekly.\par \par Continue B12 injections monthly. \par \par Maintain adequate hydration.\par \par RTC in 3 weeks.\par \par Case was seen and discussed with Dr. Wade who agreed with the assessment and plan.\par

## 2020-04-13 ENCOUNTER — APPOINTMENT (OUTPATIENT)
Dept: INFUSION THERAPY | Facility: CLINIC | Age: 75
End: 2020-04-13

## 2020-04-13 ENCOUNTER — LABORATORY RESULT (OUTPATIENT)
Age: 75
End: 2020-04-13

## 2020-04-13 LAB
HCT VFR BLD CALC: 20.9 %
HGB BLD-MCNC: 7.1 G/DL
MCHC RBC-ENTMCNC: 34 G/DL
MCHC RBC-ENTMCNC: 36.6 PG
MCV RBC AUTO: 107.7 FL
PLATELET # BLD AUTO: 318 K/UL
PMV BLD: 10.3 FL
RBC # BLD: 1.94 M/UL
RBC # FLD: 19.5 %
WBC # FLD AUTO: 3.39 K/UL

## 2020-04-13 RX ORDER — ERYTHROPOIETIN 10000 [IU]/ML
80000 INJECTION, SOLUTION INTRAVENOUS; SUBCUTANEOUS ONCE
Refills: 0 | Status: COMPLETED | OUTPATIENT
Start: 2020-04-13 | End: 2020-04-13

## 2020-04-13 RX ADMIN — ERYTHROPOIETIN 80000 UNIT(S): 10000 INJECTION, SOLUTION INTRAVENOUS; SUBCUTANEOUS at 15:51

## 2020-04-16 ENCOUNTER — APPOINTMENT (OUTPATIENT)
Dept: HEMATOLOGY ONCOLOGY | Facility: CLINIC | Age: 75
End: 2020-04-16
Payer: MEDICARE

## 2020-04-16 ENCOUNTER — APPOINTMENT (OUTPATIENT)
Dept: INFUSION THERAPY | Facility: CLINIC | Age: 75
End: 2020-04-16

## 2020-04-16 ENCOUNTER — LABORATORY RESULT (OUTPATIENT)
Age: 75
End: 2020-04-16

## 2020-04-16 LAB
HCT VFR BLD CALC: 22.2 %
HGB BLD-MCNC: 7 G/DL
MCHC RBC-ENTMCNC: 31.5 G/DL
MCHC RBC-ENTMCNC: 35.4 PG
MCV RBC AUTO: 112.1 FL
PLATELET # BLD AUTO: 294 K/UL
PMV BLD: 9.1 FL
RBC # BLD: 1.98 M/UL
RBC # FLD: 18.8 %
WBC # FLD AUTO: 2.77 K/UL

## 2020-04-16 PROCEDURE — 99211 OFF/OP EST MAY X REQ PHY/QHP: CPT

## 2020-04-16 RX ORDER — ERYTHROPOIETIN 10000 [IU]/ML
80000 INJECTION, SOLUTION INTRAVENOUS; SUBCUTANEOUS ONCE
Refills: 0 | Status: DISCONTINUED | OUTPATIENT
Start: 2020-04-16 | End: 2020-04-16

## 2020-04-20 ENCOUNTER — RESULT REVIEW (OUTPATIENT)
Age: 75
End: 2020-04-20

## 2020-04-20 ENCOUNTER — LABORATORY RESULT (OUTPATIENT)
Age: 75
End: 2020-04-20

## 2020-04-20 ENCOUNTER — APPOINTMENT (OUTPATIENT)
Dept: HEMATOLOGY ONCOLOGY | Facility: CLINIC | Age: 75
End: 2020-04-20
Payer: MEDICARE

## 2020-04-20 ENCOUNTER — APPOINTMENT (OUTPATIENT)
Dept: INFUSION THERAPY | Facility: CLINIC | Age: 75
End: 2020-04-20
Payer: MEDICARE

## 2020-04-20 VITALS
BODY MASS INDEX: 25.95 KG/M2 | SYSTOLIC BLOOD PRESSURE: 126 MMHG | DIASTOLIC BLOOD PRESSURE: 56 MMHG | WEIGHT: 141 LBS | HEIGHT: 62 IN | HEART RATE: 80 BPM | TEMPERATURE: 96.6 F

## 2020-04-20 LAB
HCT VFR BLD CALC: 21.2 %
HGB BLD-MCNC: 6.8 G/DL
MCHC RBC-ENTMCNC: 32.1 G/DL
MCHC RBC-ENTMCNC: 35.6 PG
MCV RBC AUTO: 111 FL
PLATELET # BLD AUTO: 410 K/UL
PMV BLD: 9.6 FL
RBC # BLD: 1.91 M/UL
RBC # FLD: 18.2 %
WBC # FLD AUTO: 4.65 K/UL

## 2020-04-20 PROCEDURE — 88189 FLOWCYTOMETRY/READ 16 & >: CPT

## 2020-04-20 PROCEDURE — 99215 OFFICE O/P EST HI 40 MIN: CPT

## 2020-04-20 PROCEDURE — 38221 DX BONE MARROW BIOPSIES: CPT

## 2020-04-20 PROCEDURE — 88313 SPECIAL STAINS GROUP 2: CPT | Mod: 26

## 2020-04-20 PROCEDURE — 88311 DECALCIFY TISSUE: CPT | Mod: 26

## 2020-04-20 PROCEDURE — 88341 IMHCHEM/IMCYTCHM EA ADD ANTB: CPT | Mod: 26,59

## 2020-04-20 PROCEDURE — 88342 IMHCHEM/IMCYTCHM 1ST ANTB: CPT | Mod: 26,59

## 2020-04-20 PROCEDURE — 88305 TISSUE EXAM BY PATHOLOGIST: CPT | Mod: 26

## 2020-04-20 PROCEDURE — 38220 DX BONE MARROW ASPIRATIONS: CPT | Mod: LT

## 2020-04-20 PROCEDURE — 38220 DX BONE MARROW ASPIRATIONS: CPT | Mod: LT,59

## 2020-04-20 RX ORDER — ERYTHROPOIETIN 10000 [IU]/ML
80000 INJECTION, SOLUTION INTRAVENOUS; SUBCUTANEOUS ONCE
Refills: 0 | Status: COMPLETED | OUTPATIENT
Start: 2020-04-20 | End: 2020-04-20

## 2020-04-20 RX ADMIN — ERYTHROPOIETIN 80000 UNIT(S): 10000 INJECTION, SOLUTION INTRAVENOUS; SUBCUTANEOUS at 17:22

## 2020-04-20 NOTE — END OF VISIT
[FreeTextEntry3] : I was physically present for the key portions of the evaluation and management service provided.  I agree with the history and physical, and plan which I have reviewed and edited where appropriate.\par  [] : Fellow

## 2020-04-20 NOTE — ASSESSMENT
[FreeTextEntry1] : # Lowrisk myelodysplastic syndrome, on Revlimid and Procrit. \par # Vitamin  B12 deficiency.\par \par Plan:\par The patient's anemia and PRBCs requirement are worsening, despite being on Revlimid daily and Procrit . \par We are recommending to repeat bone marrow biopsy . We will perform it today\par We will discontinue Revlimid and start hypomethylating agent with Vidaza sc daily x 5 days Q 28 days.\par \par Continue with Procrit 80,000 units weekly.\par Continue B12 injections monthly. \par \par Will monitor pt with weekly CBCs. Pt will follow up in 2 weeks\par \par The antineoplastic therapy dose was adjusted according to pt's labs and anticipated tolerance.\par The high risk of complications and complexity associated with antineoplastic therapy administration has been explained to the pt and family.\par Treatment will be administered under my supervision.\par \par \par RTC in 2 weeks.\par The patient was seen and examined by Dr Wade who agreed with the above plan\par \par

## 2020-04-20 NOTE — REASON FOR VISIT
[Follow-Up Visit] : a follow-up [FreeTextEntry2] : MDS [Bone Marrow Biopsy] : bone marrow biopsy [Bone Marrow Aspiration] : bone marrow aspiration

## 2020-04-20 NOTE — HISTORY OF PRESENT ILLNESS
[de-identified] : She missed on appointment for procrit last week.\par She starts her next cycle on 6/25/18\par C/o back pain radiating down her left which occurred when she bent to  something.\par No change in diarrhea.\par \par 7/31/18:\par Doing well. On Revlimid for more than 2yrs, 5mg 2weeks on 1 week off. She will be starting week on tonight. \par C/o diarrhea. On Imodium 2 pills AM and 2 pills PM. Doesn't help much with diarrhea. \par C/o nausea, abd pain. \par Colonoscopy in 2016 was normal. \par Did not have blood transfusion for over 2 years. \par On procrit [id number] weekly. Missed last week on 7/24/18 as she was out of town. She has been non compliant with weekly Procrit.\par Mammogram in 2017 was normal. \par \par 9/11/18\par pt is here for follow up.\par She feels the lomotil helps with the diarrhea.\par She was away for a few weeks and missed her procrit injections.\par No fever, abdominal  discomfort has been less since since use of lomotil.\par She started a new cycle 2 days ago.\par \par 10/2/18:\par She will start Revlimid tonight (week on). 2 weeks on, 1 week off. \par On ASA 81mg. \par Denies fever, nausea, vomiting, chest pain, SOB, abdominal pain, and bladder problems.\par C/o diarrhea. \par S/p 2 units PRBC transfusion on 9/25/18. \par On Procrit 18698 units weekly. Not compliant every week. \par C/o intermittent dizziness. \par \par 10/31/18\par Pt is here for follow up.\par C/O significant fatigue.\par Continues to have diarrhea, takes imodium and lomotil as needed.\par C/o dry cough, no fever.\par Cough started a month ago. It is worse in the mornings however over last week it has been constant all day.\par No chest pain.\par She was found to have low B12 levels and was started on B12 injections.\par \par 11/27/18:\par Doing well. Hospitalized for 3 days for cellulitis/abcess of the back s/p drainage and on Abx currently. Developed 3 days after Mg infusion as per pt. \par Denies nausea, vomiting, chest pain, SOB, abdominal pain, bowel and bladder problems.\par This is the week on Revlimid (week 1).\par S/p 1 unit PRBC transfusion in the hospital. \par On Procrit weekly \par \par 12/27/18:\par Doing well. No major complaints.\par Denies fever, nausea, vomiting, chest pain, SOB, abdominal pain, and bladder problems.\par On Revlimid 5 mg 2 weeks on 1 week off. This is the week off. She will start the week on sunday (12/30/18).\par Due for Procrit 57626 units today. \par Still has diarrhea. 4-5bm/day.\par On monthly B12 injections. \par \par 1/30/19:\par Patient here for follow up for MDS.  She is taking Revlimid 5 mg, 2 weeks on, 1 week off.  She will complete this current cycle on Saturday.  She has no new complaints today.  Patient denies cough, shortness of breath, denies fever, denies bone pain.\par \par 03/04/2019\par Patient is here for a follow up visit. She complaints of severe pain in her left shoulder and has had abscess drained 2 weeks ago. However the pain is worse and she has purulent discharge on examination. She also has Nasal sores that are painful. Culture from 11/2018 showed MRSA.  Denies Fever. \par She also complaints of swelling in her right leg. Not tender and not erythematous and negative Gong;s sign. \par Her diarrhea with Revlimid is ongoing and Imodium and Lomotil helps but not everyday. \par She is on 60,000 units of procrit weekly and Revlimid 5 mg 2 weeks on 1 week off. \par \par 4/23/19\par Pt is here for follow up.\par She feels well.\par The nasal sores have improved with bactroban\par The abscess on left shoulder has resolved.\par However she has a new one on the middle of her back.\par No fever.\par Pt has been on procrit 35198 units weekly, however she missed a dose in between.\par \par 5/8/19\par pt is here for follow up.\par no new complaints.\par feels well.\par Her skin abscess has resolved.\par she has been unable to go to ID, as she could not get an appt.\par No bleeding.\par She is compliant with revlimid.\par \par 6/6/19\par Pt is here for follow up.\par no new complaints \par Feels well.\par Is on week 2 of her Revlimid cycle. \par No transfusions since last visit\par \par 7/17/19\par Pt is here for follow up.\par C/O fatigue.\par Was away for a few days two weeks ago, but missed treatment with procrit for 2 weeks.\par Is complaint with Revlimid 2 weeks on 1 week off.\par Diarrhea is a little improved.\par \par \par 8/14/19\par Patient here for follow up visit, feeling well.  Although she is complaining of some pain at the left scapula area recently.  Patient denies cough, shortness of breath, denies fever, denies other bone pain.  She is off Revlimid  this week, due to restart on Monday.  She is scheduled for Procrit and Vitamin B12 injection today.  She plans to leave the country tomorrow to visit her mother who is ill.  She will return in about 10 days.\par \par 9/11/19\par Pt is here for follow up.\par She was away for 3 weeks, out of which she took procrit for 2 weeks in [place].\par She missed last week's dose.\par She had CBCs in [place] which showed Hgb of 8.9\par She feels well.\par She still getting diarrhea.\par She has been using lomotil with very minimal relief.\par She started a new cycle of Revlimid 4 days ago.\par \par 10/3/19\par Patient here for follow up visit, feeling fairly well.   Patient denies cough, shortness of breath, denies fever, denies other bone pain.  She remains on Revlimid.  She is scheduled for Procrit and Vitamin B12 injection today.\par \par 10/24/19\par Pt is here for follow up.\par Feels well.\par No SOB, fatigue.\par Compliant with procrit and Revl;imid.\par Remains on ASa.\par Diarrhea is unchanged.\par Pt takes imodium as needed.\par \par 11/14/19\par Pt is here for follow up.\par No new complaints.\par Starts a new cycle of Revlimid today.\par Diarrhea is same as before, no rectal bleeding.\par No fever.\par \par 12/5/19\par Pt is here for follow up.\par No new complaints.\par Denies feeling weaker than usual.\par No fever, SOB.\par No change in frequency of diarrhea.\par No pain.\par Will start next cycle of Revlimid in 3 days.\par \par \par !/16/20\par Pt was recently DCed from [facility] 3 days ago after being treated for pneumonia and MARCO.\par She is on po Levaquin.\par She restarted Revlmid 3 days ago.\par She is feeling better now. No fever.\par No SOB, Chest pain and back pain has resolved.\par Pt has a cough, no expectoration.\par She also recd a unit of PRBCs last week in the hospital\par \par \par 1/30/20\par Patient here for follow up visit, feeling well.  She has no new complaints. Cough is almost gone completely.  Patient denies shortness of breath, denies fever, denies bone pain.  Her appetite is ok, weight is stable.  She is due to restart Revlimid on Monday.\par \par 02/20/20\par Pt is here for follow up, feeling well.\par Offers no new complaints. Denies SOB, fever, chills, weight loss, CP, cough. \par Currently off week of Revlimid 5 mg. Restarts on Monday.\par Has procrit 80,000 units today. Next b 12 injection due in 2 weeks \par Did not get chest Xray\par CBC reviewed WBC 3.1, h/h 8.3/25%, plt 386, neutrophils 1.29\par \par 3/12/20\par Pt is here for follow up.\par She took Revlimid for 2 weeks and then has been off for one week although she was advised to take it daily without break.\par No SOB, Cough, fever.\par Has mild fatigue.\par \par 04/02/2020\par SIMONA KUHN a 75 year F is here today for follow up visit of MDS.\par Denies fever, chills, cough,night sweats, weight loss. \par Denies bleeding or bruising.\par Pt receives procrit 80,000 units weekly and B12 IM monthly. \par Continued Revlimid 5 mg daily x 3 weeks, has 1 dose left tonight. \par CBC reviewed: WBC 3.2, H/H 8.1/25.2, neutrophils 1.6, PLT 72\par \par 4/20/2020: The patient is here for follow up . She has no complaints of fever, chills, dizziness , chest pain and shortness of breath . She is still with diarrhea , up to 10 watery BMs per day, responds poorly to imodium and lomotil. She is on Revlimid 5 mg daily , took last dose yesterday night. Her last Procrit was on April 13. \par \par  [de-identified] : \par

## 2020-04-20 NOTE — HISTORY OF PRESENT ILLNESS
[de-identified] : She missed on appointment for procrit last week.\par She starts her next cycle on 6/25/18\par C/o back pain radiating down her left which occurred when she bent to  something.\par No change in diarrhea.\par \par 7/31/18:\par Doing well. On Revlimid for more than 2yrs, 5mg 2weeks on 1 week off. She will be starting week on tonight. \par C/o diarrhea. On Imodium 2 pills AM and 2 pills PM. Doesn't help much with diarrhea. \par C/o nausea, abd pain. \par Colonoscopy in 2016 was normal. \par Did not have blood transfusion for over 2 years. \par On procrit [id number] weekly. Missed last week on 7/24/18 as she was out of town. She has been non compliant with weekly Procrit.\par Mammogram in 2017 was normal. \par \par 9/11/18\par pt is here for follow up.\par She feels the lomotil helps with the diarrhea.\par She was away for a few weeks and missed her procrit injections.\par No fever, abdominal  discomfort has been less since since use of lomotil.\par She started a new cycle 2 days ago.\par \par 10/2/18:\par She will start Revlimid tonight (week on). 2 weeks on, 1 week off. \par On ASA 81mg. \par Denies fever, nausea, vomiting, chest pain, SOB, abdominal pain, and bladder problems.\par C/o diarrhea. \par S/p 2 units PRBC transfusion on 9/25/18. \par On Procrit 29326 units weekly. Not compliant every week. \par C/o intermittent dizziness. \par \par 10/31/18\par Pt is here for follow up.\par C/O significant fatigue.\par Continues to have diarrhea, takes imodium and lomotil as needed.\par C/o dry cough, no fever.\par Cough started a month ago. It is worse in the mornings however over last week it has been constant all day.\par No chest pain.\par She was found to have low B12 levels and was started on B12 injections.\par \par 11/27/18:\par Doing well. Hospitalized for 3 days for cellulitis/abcess of the back s/p drainage and on Abx currently. Developed 3 days after Mg infusion as per pt. \par Denies nausea, vomiting, chest pain, SOB, abdominal pain, bowel and bladder problems.\par This is the week on Revlimid (week 1).\par S/p 1 unit PRBC transfusion in the hospital. \par On Procrit weekly \par \par 12/27/18:\par Doing well. No major complaints.\par Denies fever, nausea, vomiting, chest pain, SOB, abdominal pain, and bladder problems.\par On Revlimid 5 mg 2 weeks on 1 week off. This is the week off. She will start the week on sunday (12/30/18).\par Due for Procrit 78472 units today. \par Still has diarrhea. 4-5bm/day.\par On monthly B12 injections. \par \par 1/30/19:\par Patient here for follow up for MDS.  She is taking Revlimid 5 mg, 2 weeks on, 1 week off.  She will complete this current cycle on Saturday.  She has no new complaints today.  Patient denies cough, shortness of breath, denies fever, denies bone pain.\par \par 03/04/2019\par Patient is here for a follow up visit. She complaints of severe pain in her left shoulder and has had abscess drained 2 weeks ago. However the pain is worse and she has purulent discharge on examination. She also has Nasal sores that are painful. Culture from 11/2018 showed MRSA.  Denies Fever. \par She also complaints of swelling in her right leg. Not tender and not erythematous and negative Gong;s sign. \par Her diarrhea with Revlimid is ongoing and Imodium and Lomotil helps but not everyday. \par She is on 60,000 units of procrit weekly and Revlimid 5 mg 2 weeks on 1 week off. \par \par 4/23/19\par Pt is here for follow up.\par She feels well.\par The nasal sores have improved with bactroban\par The abscess on left shoulder has resolved.\par However she has a new one on the middle of her back.\par No fever.\par Pt has been on procrit 55291 units weekly, however she missed a dose in between.\par \par 5/8/19\par pt is here for follow up.\par no new complaints.\par feels well.\par Her skin abscess has resolved.\par she has been unable to go to ID, as she could not get an appt.\par No bleeding.\par She is compliant with revlimid.\par \par 6/6/19\par Pt is here for follow up.\par no new complaints \par Feels well.\par Is on week 2 of her Revlimid cycle. \par No transfusions since last visit\par \par 7/17/19\par Pt is here for follow up.\par C/O fatigue.\par Was away for a few days two weeks ago, but missed treatment with procrit for 2 weeks.\par Is complaint with Revlimid 2 weeks on 1 week off.\par Diarrhea is a little improved.\par \par \par 8/14/19\par Patient here for follow up visit, feeling well.  Although she is complaining of some pain at the left scapula area recently.  Patient denies cough, shortness of breath, denies fever, denies other bone pain.  She is off Revlimid  this week, due to restart on Monday.  She is scheduled for Procrit and Vitamin B12 injection today.  She plans to leave the country tomorrow to visit her mother who is ill.  She will return in about 10 days.\par \par 9/11/19\par Pt is here for follow up.\par She was away for 3 weeks, out of which she took procrit for 2 weeks in [place].\par She missed last week's dose.\par She had CBCs in [place] which showed Hgb of 8.9\par She feels well.\par She still getting diarrhea.\par She has been using lomotil with very minimal relief.\par She started a new cycle of Revlimid 4 days ago.\par \par 10/3/19\par Patient here for follow up visit, feeling fairly well.   Patient denies cough, shortness of breath, denies fever, denies other bone pain.  She remains on Revlimid.  She is scheduled for Procrit and Vitamin B12 injection today.\par \par 10/24/19\par Pt is here for follow up.\par Feels well.\par No SOB, fatigue.\par Compliant with procrit and Revl;imid.\par Remains on ASa.\par Diarrhea is unchanged.\par Pt takes imodium as needed.\par \par 11/14/19\par Pt is here for follow up.\par No new complaints.\par Starts a new cycle of Revlimid today.\par Diarrhea is same as before, no rectal bleeding.\par No fever.\par \par 12/5/19\par Pt is here for follow up.\par No new complaints.\par Denies feeling weaker than usual.\par No fever, SOB.\par No change in frequency of diarrhea.\par No pain.\par Will start next cycle of Revlimid in 3 days.\par \par \par !/16/20\par Pt was recently DCed from [facility] 3 days ago after being treated for pneumonia and MARCO.\par She is on po Levaquin.\par She restarted Revlmid 3 days ago.\par She is feeling better now. No fever.\par No SOB, Chest pain and back pain has resolved.\par Pt has a cough, no expectoration.\par She also recd a unit of PRBCs last week in the hospital\par \par \par 1/30/20\par Patient here for follow up visit, feeling well.  She has no new complaints. Cough is almost gone completely.  Patient denies shortness of breath, denies fever, denies bone pain.  Her appetite is ok, weight is stable.  She is due to restart Revlimid on Monday.\par \par 02/20/20\par Pt is here for follow up, feeling well.\par Offers no new complaints. Denies SOB, fever, chills, weight loss, CP, cough. \par Currently off week of Revlimid 5 mg. Restarts on Monday.\par Has procrit 80,000 units today. Next b 12 injection due in 2 weeks \par Did not get chest Xray\par CBC reviewed WBC 3.1, h/h 8.3/25%, plt 386, neutrophils 1.29\par \par 3/12/20\par Pt is here for follow up.\par She took Revlimid for 2 weeks and then has been off for one week although she was advised to take it daily without break.\par No SOB, Cough, fever.\par Has mild fatigue.\par \par 04/02/2020\par SIMONA KUHN a 75 year F is here today for follow up visit of MDS.\par Denies fever, chills, cough,night sweats, weight loss. \par Denies bleeding or bruising.\par Pt receives procrit 80,000 units weekly and B12 IM monthly. \par Continued Revlimid 5 mg daily x 3 weeks, has 1 dose left tonight. \par CBC reviewed: WBC 3.2, H/H 8.1/25.2, neutrophils 1.6, PLT 72\par \par 4/20/2020: The patient is here for follow up . She has no complaints of fever, chills, dizziness , chest pain and shortness of breath . She is still with diarrhea , up to 10 watery BMs per day, responds poorly to imodium and lomotil. She is on Revlimid 5 mg daily , took last dose yesterday night. Her last Procrit was on April 13. \par \par  [de-identified] : \par

## 2020-04-21 ENCOUNTER — APPOINTMENT (OUTPATIENT)
Dept: INFUSION THERAPY | Facility: CLINIC | Age: 75
End: 2020-04-21

## 2020-04-21 LAB
ABO + RH PNL BLD: NORMAL
BLD GP AB SCN SERPL QL: NORMAL

## 2020-04-21 NOTE — PROCEDURE
[Bone Marrow Biopsy] : bone marrow biopsy [Bone Marrow Aspiration] : bone marrow aspiration  [Patient] : the patient [Patient identification verified] : patient identification verified [Verbal Consent Obtained] : verbal consent was obtained prior to the procedure [Procedure verified and consent obtained] : procedure verified and consent obtained [Laterality verified and correct site marked] : laterality verified and correct site marked [Right] : site: right [Correct positioning] : correct positioning [Left lateral decibitus position] : left lateral decibitus position [Superior iliac spine was identified] : the superior iliac spine was identified. [The right posterior iliac crest was prepped with betadine and draped, using sterile technique.] : The right posterior iliac crest was prepped with betadine and draped, using sterile technique. [Aspirate] : aspirate [Lidocaine was injected and into the periosteum overlying the site.] : Lidocaine was injected and into the periosteum overlying the site. [Cytogenetics] : cytogenetics [FISH] : FISH [Biopsy] : biopsy [] : The patient was instructed to remove the bandage the following AM. The patient may bathe. Acetaminophen may be taken for discomfort, as per package directions.If there are any other problems, the patient was instructed to call the office. The patient verbalized understanding, and is aware of the office contact numbers. [Flow Cytometry] : flow cytometry [FreeTextEntry1] : post treatment for MDS

## 2020-04-21 NOTE — PROCEDURE
[Bone Marrow Biopsy] : bone marrow biopsy [Bone Marrow Aspiration] : bone marrow aspiration  [Patient] : the patient [Verbal Consent Obtained] : verbal consent was obtained prior to the procedure [Patient identification verified] : patient identification verified [Procedure verified and consent obtained] : procedure verified and consent obtained [Laterality verified and correct site marked] : laterality verified and correct site marked [Right] : site: right [Correct positioning] : correct positioning [Left lateral decibitus position] : left lateral decibitus position [Superior iliac spine was identified] : the superior iliac spine was identified. [The right posterior iliac crest was prepped with betadine and draped, using sterile technique.] : The right posterior iliac crest was prepped with betadine and draped, using sterile technique. [Lidocaine was injected and into the periosteum overlying the site.] : Lidocaine was injected and into the periosteum overlying the site. [Aspirate] : aspirate [Cytogenetics] : cytogenetics [FISH] : FISH [Biopsy] : biopsy [] : The patient was instructed to remove the bandage the following AM. The patient may bathe. Acetaminophen may be taken for discomfort, as per package directions.If there are any other problems, the patient was instructed to call the office. The patient verbalized understanding, and is aware of the office contact numbers. [Flow Cytometry] : flow cytometry [FreeTextEntry1] : post treatment for MDS

## 2020-04-27 ENCOUNTER — APPOINTMENT (OUTPATIENT)
Dept: INFUSION THERAPY | Facility: CLINIC | Age: 75
End: 2020-04-27

## 2020-04-27 ENCOUNTER — LABORATORY RESULT (OUTPATIENT)
Age: 75
End: 2020-04-27

## 2020-04-27 VITALS
RESPIRATION RATE: 18 BRPM | TEMPERATURE: 96.8 F | HEART RATE: 72 BPM | SYSTOLIC BLOOD PRESSURE: 155 MMHG | DIASTOLIC BLOOD PRESSURE: 70 MMHG

## 2020-04-27 LAB
HCT VFR BLD CALC: 28.1 %
HEMATOPATHOLOGY REPORT: SIGNIFICANT CHANGE UP
HGB BLD-MCNC: 9.5 G/DL
MCHC RBC-ENTMCNC: 33.8 G/DL
MCHC RBC-ENTMCNC: 35.4 PG
MCV RBC AUTO: 104.9 FL
PLATELET # BLD AUTO: 348 K/UL
PMV BLD: 10 FL
RBC # BLD: 2.68 M/UL
RBC # FLD: 17.6 %
WBC # FLD AUTO: 4.76 K/UL

## 2020-04-27 RX ORDER — AZACITIDINE FOR 100 MG/1
123 INJECTION, POWDER, LYOPHILIZED, FOR SOLUTION INTRAVENOUS; SUBCUTANEOUS ONCE
Refills: 0 | Status: COMPLETED | OUTPATIENT
Start: 2020-04-27 | End: 2020-04-27

## 2020-04-27 RX ORDER — ONDANSETRON 8 MG/1
8 TABLET, FILM COATED ORAL ONCE
Refills: 0 | Status: COMPLETED | OUTPATIENT
Start: 2020-04-27 | End: 2020-04-27

## 2020-04-27 RX ORDER — ERYTHROPOIETIN 10000 [IU]/ML
80000 INJECTION, SOLUTION INTRAVENOUS; SUBCUTANEOUS ONCE
Refills: 0 | Status: COMPLETED | OUTPATIENT
Start: 2020-04-27 | End: 2020-04-27

## 2020-04-27 RX ADMIN — ONDANSETRON 8 MILLIGRAM(S): 8 TABLET, FILM COATED ORAL at 11:20

## 2020-04-27 RX ADMIN — AZACITIDINE FOR 123 MILLIGRAM(S): 100 INJECTION, POWDER, LYOPHILIZED, FOR SOLUTION INTRAVENOUS; SUBCUTANEOUS at 11:59

## 2020-04-27 RX ADMIN — ERYTHROPOIETIN 80000 UNIT(S): 10000 INJECTION, SOLUTION INTRAVENOUS; SUBCUTANEOUS at 11:20

## 2020-04-28 ENCOUNTER — APPOINTMENT (OUTPATIENT)
Dept: INFUSION THERAPY | Facility: CLINIC | Age: 75
End: 2020-04-28

## 2020-04-28 RX ORDER — ONDANSETRON 8 MG/1
8 TABLET, FILM COATED ORAL ONCE
Refills: 0 | Status: COMPLETED | OUTPATIENT
Start: 2020-04-28 | End: 2020-04-28

## 2020-04-28 RX ORDER — AZACITIDINE FOR 100 MG/1
123 INJECTION, POWDER, LYOPHILIZED, FOR SOLUTION INTRAVENOUS; SUBCUTANEOUS ONCE
Refills: 0 | Status: COMPLETED | OUTPATIENT
Start: 2020-04-28 | End: 2020-04-28

## 2020-04-28 RX ADMIN — ONDANSETRON 8 MILLIGRAM(S): 8 TABLET, FILM COATED ORAL at 13:21

## 2020-04-28 RX ADMIN — AZACITIDINE FOR 123 MILLIGRAM(S): 100 INJECTION, POWDER, LYOPHILIZED, FOR SOLUTION INTRAVENOUS; SUBCUTANEOUS at 14:00

## 2020-04-29 ENCOUNTER — APPOINTMENT (OUTPATIENT)
Dept: INFUSION THERAPY | Facility: CLINIC | Age: 75
End: 2020-04-29

## 2020-04-29 VITALS
DIASTOLIC BLOOD PRESSURE: 72 MMHG | HEART RATE: 83 BPM | TEMPERATURE: 98.5 F | SYSTOLIC BLOOD PRESSURE: 106 MMHG | RESPIRATION RATE: 16 BRPM

## 2020-04-29 RX ORDER — AZACITIDINE FOR 100 MG/1
123 INJECTION, POWDER, LYOPHILIZED, FOR SOLUTION INTRAVENOUS; SUBCUTANEOUS ONCE
Refills: 0 | Status: COMPLETED | OUTPATIENT
Start: 2020-04-29 | End: 2020-04-29

## 2020-04-29 RX ORDER — ONDANSETRON 8 MG/1
8 TABLET, FILM COATED ORAL ONCE
Refills: 0 | Status: COMPLETED | OUTPATIENT
Start: 2020-04-29 | End: 2020-04-29

## 2020-04-29 RX ADMIN — AZACITIDINE FOR 123 MILLIGRAM(S): 100 INJECTION, POWDER, LYOPHILIZED, FOR SOLUTION INTRAVENOUS; SUBCUTANEOUS at 15:00

## 2020-04-29 RX ADMIN — ONDANSETRON 8 MILLIGRAM(S): 8 TABLET, FILM COATED ORAL at 15:00

## 2020-04-30 ENCOUNTER — APPOINTMENT (OUTPATIENT)
Dept: INFUSION THERAPY | Facility: CLINIC | Age: 75
End: 2020-04-30

## 2020-04-30 RX ORDER — AZACITIDINE FOR 100 MG/1
123 INJECTION, POWDER, LYOPHILIZED, FOR SOLUTION INTRAVENOUS; SUBCUTANEOUS ONCE
Refills: 0 | Status: COMPLETED | OUTPATIENT
Start: 2020-04-30 | End: 2020-04-30

## 2020-04-30 RX ORDER — ONDANSETRON 8 MG/1
8 TABLET, FILM COATED ORAL ONCE
Refills: 0 | Status: COMPLETED | OUTPATIENT
Start: 2020-04-30 | End: 2020-04-30

## 2020-04-30 RX ORDER — PREGABALIN 225 MG/1
1000 CAPSULE ORAL ONCE
Refills: 0 | Status: COMPLETED | OUTPATIENT
Start: 2020-04-30 | End: 2020-04-30

## 2020-04-30 RX ADMIN — ONDANSETRON 8 MILLIGRAM(S): 8 TABLET, FILM COATED ORAL at 10:13

## 2020-04-30 RX ADMIN — AZACITIDINE FOR 123 MILLIGRAM(S): 100 INJECTION, POWDER, LYOPHILIZED, FOR SOLUTION INTRAVENOUS; SUBCUTANEOUS at 10:50

## 2020-04-30 RX ADMIN — PREGABALIN 1000 MICROGRAM(S): 225 CAPSULE ORAL at 10:12

## 2020-05-01 ENCOUNTER — APPOINTMENT (OUTPATIENT)
Dept: INFUSION THERAPY | Facility: CLINIC | Age: 75
End: 2020-05-01

## 2020-05-01 LAB — DNA PLOIDY SPEC FC-IMP: SIGNIFICANT CHANGE UP

## 2020-05-01 RX ORDER — ONDANSETRON 8 MG/1
8 TABLET, FILM COATED ORAL ONCE
Refills: 0 | Status: COMPLETED | OUTPATIENT
Start: 2020-05-01 | End: 2020-05-01

## 2020-05-01 RX ORDER — AZACITIDINE FOR 100 MG/1
123 INJECTION, POWDER, LYOPHILIZED, FOR SOLUTION INTRAVENOUS; SUBCUTANEOUS ONCE
Refills: 0 | Status: COMPLETED | OUTPATIENT
Start: 2020-05-01 | End: 2020-05-01

## 2020-05-01 RX ADMIN — AZACITIDINE FOR 123 MILLIGRAM(S): 100 INJECTION, POWDER, LYOPHILIZED, FOR SOLUTION INTRAVENOUS; SUBCUTANEOUS at 14:27

## 2020-05-01 RX ADMIN — ONDANSETRON 8 MILLIGRAM(S): 8 TABLET, FILM COATED ORAL at 14:27

## 2020-05-05 ENCOUNTER — LABORATORY RESULT (OUTPATIENT)
Age: 75
End: 2020-05-05

## 2020-05-05 ENCOUNTER — APPOINTMENT (OUTPATIENT)
Dept: INFUSION THERAPY | Facility: CLINIC | Age: 75
End: 2020-05-05

## 2020-05-05 ENCOUNTER — OUTPATIENT (OUTPATIENT)
Dept: OUTPATIENT SERVICES | Facility: HOSPITAL | Age: 75
LOS: 1 days | Discharge: HOME | End: 2020-05-05
Payer: MEDICARE

## 2020-05-05 DIAGNOSIS — D64.9 ANEMIA, UNSPECIFIED: ICD-10-CM

## 2020-05-05 DIAGNOSIS — R05 COUGH: ICD-10-CM

## 2020-05-05 DIAGNOSIS — E53.8 DEFICIENCY OF OTHER SPECIFIED B GROUP VITAMINS: ICD-10-CM

## 2020-05-05 DIAGNOSIS — D46.9 MYELODYSPLASTIC SYNDROME, UNSPECIFIED: ICD-10-CM

## 2020-05-05 DIAGNOSIS — Z79.899 OTHER LONG TERM (CURRENT) DRUG THERAPY: ICD-10-CM

## 2020-05-05 LAB
HCT VFR BLD CALC: 21.8 %
HGB BLD-MCNC: 7.2 G/DL
MCHC RBC-ENTMCNC: 33 G/DL
MCHC RBC-ENTMCNC: 35.3 PG
MCV RBC AUTO: 106.9 FL
PLATELET # BLD AUTO: 350 K/UL
PMV BLD: 9.8 FL
RBC # BLD: 2.04 M/UL
RBC # FLD: 16 %
WBC # FLD AUTO: 2.34 K/UL

## 2020-05-05 RX ORDER — ERYTHROPOIETIN 10000 [IU]/ML
80000 INJECTION, SOLUTION INTRAVENOUS; SUBCUTANEOUS ONCE
Refills: 0 | Status: COMPLETED | OUTPATIENT
Start: 2020-05-05 | End: 2020-05-05

## 2020-05-05 RX ADMIN — ERYTHROPOIETIN 80000 UNIT(S): 10000 INJECTION, SOLUTION INTRAVENOUS; SUBCUTANEOUS at 13:44

## 2020-05-11 ENCOUNTER — APPOINTMENT (OUTPATIENT)
Dept: HEMATOLOGY ONCOLOGY | Facility: CLINIC | Age: 75
End: 2020-05-11

## 2020-05-11 ENCOUNTER — APPOINTMENT (OUTPATIENT)
Dept: INFUSION THERAPY | Facility: CLINIC | Age: 75
End: 2020-05-11

## 2020-05-12 ENCOUNTER — APPOINTMENT (OUTPATIENT)
Dept: INFUSION THERAPY | Facility: CLINIC | Age: 75
End: 2020-05-12

## 2020-05-12 ENCOUNTER — LABORATORY RESULT (OUTPATIENT)
Age: 75
End: 2020-05-12

## 2020-05-12 LAB
HCT VFR BLD CALC: 21.6 %
HGB BLD-MCNC: 7.1 G/DL
MCHC RBC-ENTMCNC: 32.9 G/DL
MCHC RBC-ENTMCNC: 34.8 PG
MCV RBC AUTO: 105.9 FL
PLATELET # BLD AUTO: 286 K/UL
PMV BLD: 10.1 FL
RBC # BLD: 2.04 M/UL
RBC # FLD: 15.7 %
WBC # FLD AUTO: 2.57 K/UL

## 2020-05-12 RX ORDER — ERYTHROPOIETIN 10000 [IU]/ML
80000 INJECTION, SOLUTION INTRAVENOUS; SUBCUTANEOUS ONCE
Refills: 0 | Status: COMPLETED | OUTPATIENT
Start: 2020-05-12 | End: 2020-05-12

## 2020-05-12 RX ADMIN — ERYTHROPOIETIN 80000 UNIT(S): 10000 INJECTION, SOLUTION INTRAVENOUS; SUBCUTANEOUS at 13:36

## 2020-05-18 ENCOUNTER — APPOINTMENT (OUTPATIENT)
Dept: HEMATOLOGY ONCOLOGY | Facility: CLINIC | Age: 75
End: 2020-05-18

## 2020-05-18 ENCOUNTER — OUTPATIENT (OUTPATIENT)
Dept: OUTPATIENT SERVICES | Facility: HOSPITAL | Age: 75
LOS: 1 days | Discharge: HOME | End: 2020-05-18

## 2020-05-18 DIAGNOSIS — Z11.59 ENCOUNTER FOR SCREENING FOR OTHER VIRAL DISEASES: ICD-10-CM

## 2020-05-19 ENCOUNTER — APPOINTMENT (OUTPATIENT)
Dept: INFUSION THERAPY | Facility: CLINIC | Age: 75
End: 2020-05-19

## 2020-05-19 ENCOUNTER — LABORATORY RESULT (OUTPATIENT)
Age: 75
End: 2020-05-19

## 2020-05-19 LAB
HCT VFR BLD CALC: 21 %
HGB BLD-MCNC: 7 G/DL
MCHC RBC-ENTMCNC: 33.3 G/DL
MCHC RBC-ENTMCNC: 35.5 PG
MCV RBC AUTO: 106.6 FL
PLATELET # BLD AUTO: 434 K/UL
PMV BLD: 10.9 FL
RBC # BLD: 1.97 M/UL
RBC # FLD: 16.7 %
SARS-COV-2 RNA SPEC QL NAA+PROBE: SIGNIFICANT CHANGE UP
WBC # FLD AUTO: 4.36 K/UL

## 2020-05-19 RX ORDER — ERYTHROPOIETIN 10000 [IU]/ML
80000 INJECTION, SOLUTION INTRAVENOUS; SUBCUTANEOUS ONCE
Refills: 0 | Status: DISCONTINUED | OUTPATIENT
Start: 2020-05-19 | End: 2020-05-27

## 2020-05-26 ENCOUNTER — LABORATORY RESULT (OUTPATIENT)
Age: 75
End: 2020-05-26

## 2020-05-26 ENCOUNTER — APPOINTMENT (OUTPATIENT)
Dept: HEMATOLOGY ONCOLOGY | Facility: CLINIC | Age: 75
End: 2020-05-26
Payer: MEDICARE

## 2020-05-26 ENCOUNTER — APPOINTMENT (OUTPATIENT)
Dept: INFUSION THERAPY | Facility: CLINIC | Age: 75
End: 2020-05-26
Payer: MEDICARE

## 2020-05-26 VITALS
DIASTOLIC BLOOD PRESSURE: 57 MMHG | SYSTOLIC BLOOD PRESSURE: 115 MMHG | HEIGHT: 62 IN | HEART RATE: 97 BPM | TEMPERATURE: 96.7 F | WEIGHT: 140 LBS | BODY MASS INDEX: 25.76 KG/M2

## 2020-05-26 LAB
HCT VFR BLD CALC: 19.4 %
HGB BLD-MCNC: 6.5 G/DL
MCHC RBC-ENTMCNC: 33.5 G/DL
MCHC RBC-ENTMCNC: 35.9 PG
MCV RBC AUTO: 107.2 FL
PLATELET # BLD AUTO: 813 K/UL
PMV BLD: 10 FL
RBC # BLD: 1.81 M/UL
RBC # FLD: 17.7 %
WBC # FLD AUTO: 4.36 K/UL

## 2020-05-26 PROCEDURE — 99215 OFFICE O/P EST HI 40 MIN: CPT

## 2020-05-26 RX ORDER — ONDANSETRON 8 MG/1
8 TABLET, FILM COATED ORAL ONCE
Refills: 0 | Status: COMPLETED | OUTPATIENT
Start: 2020-05-26 | End: 2020-05-26

## 2020-05-26 RX ORDER — ERYTHROPOIETIN 10000 [IU]/ML
80000 INJECTION, SOLUTION INTRAVENOUS; SUBCUTANEOUS ONCE
Refills: 0 | Status: COMPLETED | OUTPATIENT
Start: 2020-05-26 | End: 2020-05-26

## 2020-05-26 RX ORDER — AZACITIDINE FOR 100 MG/1
123 INJECTION, POWDER, LYOPHILIZED, FOR SOLUTION INTRAVENOUS; SUBCUTANEOUS ONCE
Refills: 0 | Status: COMPLETED | OUTPATIENT
Start: 2020-05-26 | End: 2020-05-26

## 2020-05-26 RX ADMIN — ERYTHROPOIETIN 40000 UNIT(S): 10000 INJECTION, SOLUTION INTRAVENOUS; SUBCUTANEOUS at 14:37

## 2020-05-26 RX ADMIN — AZACITIDINE FOR 123 MILLIGRAM(S): 100 INJECTION, POWDER, LYOPHILIZED, FOR SOLUTION INTRAVENOUS; SUBCUTANEOUS at 15:14

## 2020-05-26 RX ADMIN — ONDANSETRON 8 MILLIGRAM(S): 8 TABLET, FILM COATED ORAL at 14:38

## 2020-05-26 NOTE — ASSESSMENT
[FreeTextEntry1] : # Lowrisk myelodysplastic syndrome, previously treated with  Revlimid and Procrit, now s/p 1 cycle of Vidaza which she tolerated well\par # Vitamin  B12 deficiency.\par \par Plan:\par Proceed with Cycle # 2 of Vidaza sc daily x 5 days Q 28 days.\par SE discussed.\par results of BM biopsy discussed with pt\par \par Continue with Procrit 80,000 units weekly.\par Continue B12 injections monthly. \par \par Pt will receive 1 unit PRBCS.\par \par Will monitor pt with weekly CBCs.\par \par The antineoplastic therapy dose was adjusted according to pt's labs and anticipated tolerance.\par The high risk of complications and complexity associated with antineoplastic therapy administration has been explained to the pt and family.\par Treatment will be administered under my supervision.\par \par \par RTC in 4 weeks.\par \par

## 2020-05-26 NOTE — HISTORY OF PRESENT ILLNESS
[de-identified] : She missed on appointment for procrit last week.\par She starts her next cycle on 6/25/18\par C/o back pain radiating down her left which occurred when she bent to  something.\par No change in diarrhea.\par \par 7/31/18:\par Doing well. On Revlimid for more than 2yrs, 5mg 2weeks on 1 week off. She will be starting week on tonight. \par C/o diarrhea. On Imodium 2 pills AM and 2 pills PM. Doesn't help much with diarrhea. \par C/o nausea, abd pain. \par Colonoscopy in 2016 was normal. \par Did not have blood transfusion for over 2 years. \par On procrit [id number] weekly. Missed last week on 7/24/18 as she was out of town. She has been non compliant with weekly Procrit.\par Mammogram in 2017 was normal. \par \par 9/11/18\par pt is here for follow up.\par She feels the lomotil helps with the diarrhea.\par She was away for a few weeks and missed her procrit injections.\par No fever, abdominal  discomfort has been less since since use of lomotil.\par She started a new cycle 2 days ago.\par \par 10/2/18:\par She will start Revlimid tonight (week on). 2 weeks on, 1 week off. \par On ASA 81mg. \par Denies fever, nausea, vomiting, chest pain, SOB, abdominal pain, and bladder problems.\par C/o diarrhea. \par S/p 2 units PRBC transfusion on 9/25/18. \par On Procrit 97686 units weekly. Not compliant every week. \par C/o intermittent dizziness. \par \par 10/31/18\par Pt is here for follow up.\par C/O significant fatigue.\par Continues to have diarrhea, takes imodium and lomotil as needed.\par C/o dry cough, no fever.\par Cough started a month ago. It is worse in the mornings however over last week it has been constant all day.\par No chest pain.\par She was found to have low B12 levels and was started on B12 injections.\par \par 11/27/18:\par Doing well. Hospitalized for 3 days for cellulitis/abcess of the back s/p drainage and on Abx currently. Developed 3 days after Mg infusion as per pt. \par Denies nausea, vomiting, chest pain, SOB, abdominal pain, bowel and bladder problems.\par This is the week on Revlimid (week 1).\par S/p 1 unit PRBC transfusion in the hospital. \par On Procrit weekly \par \par 12/27/18:\par Doing well. No major complaints.\par Denies fever, nausea, vomiting, chest pain, SOB, abdominal pain, and bladder problems.\par On Revlimid 5 mg 2 weeks on 1 week off. This is the week off. She will start the week on sunday (12/30/18).\par Due for Procrit 58382 units today. \par Still has diarrhea. 4-5bm/day.\par On monthly B12 injections. \par \par 1/30/19:\par Patient here for follow up for MDS.  She is taking Revlimid 5 mg, 2 weeks on, 1 week off.  She will complete this current cycle on Saturday.  She has no new complaints today.  Patient denies cough, shortness of breath, denies fever, denies bone pain.\par \par 03/04/2019\par Patient is here for a follow up visit. She complaints of severe pain in her left shoulder and has had abscess drained 2 weeks ago. However the pain is worse and she has purulent discharge on examination. She also has Nasal sores that are painful. Culture from 11/2018 showed MRSA.  Denies Fever. \par She also complaints of swelling in her right leg. Not tender and not erythematous and negative Gong;s sign. \par Her diarrhea with Revlimid is ongoing and Imodium and Lomotil helps but not everyday. \par She is on 60,000 units of procrit weekly and Revlimid 5 mg 2 weeks on 1 week off. \par \par 4/23/19\par Pt is here for follow up.\par She feels well.\par The nasal sores have improved with bactroban\par The abscess on left shoulder has resolved.\par However she has a new one on the middle of her back.\par No fever.\par Pt has been on procrit 21901 units weekly, however she missed a dose in between.\par \par 5/8/19\par pt is here for follow up.\par no new complaints.\par feels well.\par Her skin abscess has resolved.\par she has been unable to go to ID, as she could not get an appt.\par No bleeding.\par She is compliant with revlimid.\par \par 6/6/19\par Pt is here for follow up.\par no new complaints \par Feels well.\par Is on week 2 of her Revlimid cycle. \par No transfusions since last visit\par \par 7/17/19\par Pt is here for follow up.\par C/O fatigue.\par Was away for a few days two weeks ago, but missed treatment with procrit for 2 weeks.\par Is complaint with Revlimid 2 weeks on 1 week off.\par Diarrhea is a little improved.\par \par \par 8/14/19\par Patient here for follow up visit, feeling well.  Although she is complaining of some pain at the left scapula area recently.  Patient denies cough, shortness of breath, denies fever, denies other bone pain.  She is off Revlimid  this week, due to restart on Monday.  She is scheduled for Procrit and Vitamin B12 injection today.  She plans to leave the country tomorrow to visit her mother who is ill.  She will return in about 10 days.\par \par 9/11/19\par Pt is here for follow up.\par She was away for 3 weeks, out of which she took procrit for 2 weeks in [place].\par She missed last week's dose.\par She had CBCs in [place] which showed Hgb of 8.9\par She feels well.\par She still getting diarrhea.\par She has been using lomotil with very minimal relief.\par She started a new cycle of Revlimid 4 days ago.\par \par 10/3/19\par Patient here for follow up visit, feeling fairly well.   Patient denies cough, shortness of breath, denies fever, denies other bone pain.  She remains on Revlimid.  She is scheduled for Procrit and Vitamin B12 injection today.\par \par 10/24/19\par Pt is here for follow up.\par Feels well.\par No SOB, fatigue.\par Compliant with procrit and Revl;imid.\par Remains on ASa.\par Diarrhea is unchanged.\par Pt takes imodium as needed.\par \par 11/14/19\par Pt is here for follow up.\par No new complaints.\par Starts a new cycle of Revlimid today.\par Diarrhea is same as before, no rectal bleeding.\par No fever.\par \par 12/5/19\par Pt is here for follow up.\par No new complaints.\par Denies feeling weaker than usual.\par No fever, SOB.\par No change in frequency of diarrhea.\par No pain.\par Will start next cycle of Revlimid in 3 days.\par \par \par !/16/20\par Pt was recently DCed from [facility] 3 days ago after being treated for pneumonia and MARCO.\par She is on po Levaquin.\par She restarted Revlmid 3 days ago.\par She is feeling better now. No fever.\par No SOB, Chest pain and back pain has resolved.\par Pt has a cough, no expectoration.\par She also recd a unit of PRBCs last week in the hospital\par \par \par 1/30/20\par Patient here for follow up visit, feeling well.  She has no new complaints. Cough is almost gone completely.  Patient denies shortness of breath, denies fever, denies bone pain.  Her appetite is ok, weight is stable.  She is due to restart Revlimid on Monday.\par \par 02/20/20\par Pt is here for follow up, feeling well.\par Offers no new complaints. Denies SOB, fever, chills, weight loss, CP, cough. \par Currently off week of Revlimid 5 mg. Restarts on Monday.\par Has procrit 80,000 units today. Next b 12 injection due in 2 weeks \par Did not get chest Xray\par CBC reviewed WBC 3.1, h/h 8.3/25%, plt 386, neutrophils 1.29\par \par 3/12/20\par Pt is here for follow up.\par She took Revlimid for 2 weeks and then has been off for one week although she was advised to take it daily without break.\par No SOB, Cough, fever.\par Has mild fatigue.\par \par 04/02/2020\par SIMONA KUHN a 75 year F is here today for follow up visit of MDS.\par Denies fever, chills, cough,night sweats, weight loss. \par Denies bleeding or bruising.\par Pt receives procrit 80,000 units weekly and B12 IM monthly. \par Continued Revlimid 5 mg daily x 3 weeks, has 1 dose left tonight. \par CBC reviewed: WBC 3.2, H/H 8.1/25.2, neutrophils 1.6, PLT 72\par \par 4/20/2020: The patient is here for follow up . She has no complaints of fever, chills, dizziness , chest pain and shortness of breath . She is still with diarrhea , up to 10 watery BMs per day, responds poorly to imodium and lomotil. She is on Revlimid 5 mg daily , took last dose yesterday night. Her last Procrit was on April 13. \par \par 5/26/20\par Pt is here for follow up.\par She is feeling well. Denies SOB, chest pain, fever.\par Continues to have diarrhea although she is off of Revlmid.\par Thinks it may due to diabetic meds.\par Wants to discuss BM bx results [de-identified] : \par

## 2020-05-26 NOTE — REVIEW OF SYSTEMS
[Diarrhea] : diarrhea [Negative] : Allergic/Immunologic [Shortness Of Breath] : no shortness of breath

## 2020-05-27 ENCOUNTER — APPOINTMENT (OUTPATIENT)
Dept: INFUSION THERAPY | Facility: CLINIC | Age: 75
End: 2020-05-27

## 2020-05-27 ENCOUNTER — INPATIENT (INPATIENT)
Facility: HOSPITAL | Age: 75
LOS: 1 days | Discharge: HOME | End: 2020-05-29
Attending: INTERNAL MEDICINE | Admitting: INTERNAL MEDICINE
Payer: MEDICAID

## 2020-05-27 ENCOUNTER — LABORATORY RESULT (OUTPATIENT)
Age: 75
End: 2020-05-27

## 2020-05-27 VITALS
SYSTOLIC BLOOD PRESSURE: 115 MMHG | RESPIRATION RATE: 18 BRPM | OXYGEN SATURATION: 96 % | TEMPERATURE: 99 F | HEART RATE: 72 BPM | DIASTOLIC BLOOD PRESSURE: 68 MMHG

## 2020-05-27 LAB
ABO + RH PNL BLD: NORMAL
ALBUMIN SERPL ELPH-MCNC: 4.2 G/DL — SIGNIFICANT CHANGE UP (ref 3.5–5.2)
ALBUMIN SERPL ELPH-MCNC: 4.4 G/DL
ALBUMIN SERPL ELPH-MCNC: 4.7 G/DL
ALP BLD-CCNC: 86 U/L
ALP BLD-CCNC: 87 U/L
ALP SERPL-CCNC: 79 U/L — SIGNIFICANT CHANGE UP (ref 30–115)
ALT FLD-CCNC: 7 U/L — SIGNIFICANT CHANGE UP (ref 0–41)
ALT SERPL-CCNC: 8 U/L
ALT SERPL-CCNC: 8 U/L
ANION GAP SERPL CALC-SCNC: 12 MMOL/L — SIGNIFICANT CHANGE UP (ref 7–14)
ANION GAP SERPL CALC-SCNC: 15 MMOL/L
ANION GAP SERPL CALC-SCNC: 15 MMOL/L
ANION GAP SERPL CALC-SCNC: 15 MMOL/L — HIGH (ref 7–14)
AST SERPL-CCNC: 12 U/L
AST SERPL-CCNC: 8 U/L — SIGNIFICANT CHANGE UP (ref 0–41)
AST SERPL-CCNC: 9 U/L
BASE EXCESS BLDV CALC-SCNC: -3.2 MMOL/L — LOW (ref -2–2)
BILIRUB SERPL-MCNC: 0.5 MG/DL
BILIRUB SERPL-MCNC: 0.7 MG/DL
BILIRUB SERPL-MCNC: 1.1 MG/DL — SIGNIFICANT CHANGE UP (ref 0.2–1.2)
BLD GP AB SCN SERPL QL: NORMAL
BUN SERPL-MCNC: 33 MG/DL — HIGH (ref 10–20)
BUN SERPL-MCNC: 34 MG/DL — HIGH (ref 10–20)
BUN SERPL-MCNC: 37 MG/DL
BUN SERPL-MCNC: 38 MG/DL
CA-I SERPL-SCNC: 1.32 MMOL/L — HIGH (ref 1.12–1.3)
CALCIUM SERPL-MCNC: 9.3 MG/DL
CALCIUM SERPL-MCNC: 9.3 MG/DL — SIGNIFICANT CHANGE UP (ref 8.5–10.1)
CALCIUM SERPL-MCNC: 9.4 MG/DL
CALCIUM SERPL-MCNC: 9.7 MG/DL — SIGNIFICANT CHANGE UP (ref 8.5–10.1)
CHLORIDE SERPL-SCNC: 101 MMOL/L — SIGNIFICANT CHANGE UP (ref 98–110)
CHLORIDE SERPL-SCNC: 102 MMOL/L
CHLORIDE SERPL-SCNC: 105 MMOL/L — SIGNIFICANT CHANGE UP (ref 98–110)
CHLORIDE SERPL-SCNC: 98 MMOL/L
CO2 SERPL-SCNC: 17 MMOL/L
CO2 SERPL-SCNC: 19 MMOL/L — SIGNIFICANT CHANGE UP (ref 17–32)
CO2 SERPL-SCNC: 20 MMOL/L
CO2 SERPL-SCNC: 20 MMOL/L — SIGNIFICANT CHANGE UP (ref 17–32)
CREAT SERPL-MCNC: 1.3 MG/DL — SIGNIFICANT CHANGE UP (ref 0.7–1.5)
CREAT SERPL-MCNC: 1.4 MG/DL
CREAT SERPL-MCNC: 1.4 MG/DL — SIGNIFICANT CHANGE UP (ref 0.7–1.5)
CREAT SERPL-MCNC: 1.5 MG/DL
GAS PNL BLDV: 135 MMOL/L — LOW (ref 136–145)
GAS PNL BLDV: SIGNIFICANT CHANGE UP
GLUCOSE BLDC GLUCOMTR-MCNC: 133 MG/DL — HIGH (ref 70–99)
GLUCOSE BLDC GLUCOMTR-MCNC: 155 MG/DL — HIGH (ref 70–99)
GLUCOSE BLDC GLUCOMTR-MCNC: 157 MG/DL — HIGH (ref 70–99)
GLUCOSE BLDC GLUCOMTR-MCNC: 170 MG/DL — HIGH (ref 70–99)
GLUCOSE BLDC GLUCOMTR-MCNC: 234 MG/DL — HIGH (ref 70–99)
GLUCOSE BLDC GLUCOMTR-MCNC: 30 MG/DL — CRITICAL LOW (ref 70–99)
GLUCOSE BLDC GLUCOMTR-MCNC: 38 MG/DL — CRITICAL LOW (ref 70–99)
GLUCOSE BLDC GLUCOMTR-MCNC: 72 MG/DL — SIGNIFICANT CHANGE UP (ref 70–99)
GLUCOSE SERPL-MCNC: 132 MG/DL — HIGH (ref 70–99)
GLUCOSE SERPL-MCNC: 242 MG/DL
GLUCOSE SERPL-MCNC: 89 MG/DL — SIGNIFICANT CHANGE UP (ref 70–99)
GLUCOSE SERPL-MCNC: 96 MG/DL
HCO3 BLDV-SCNC: 23 MMOL/L — SIGNIFICANT CHANGE UP (ref 22–29)
HCT VFR BLD CALC: 20.7 %
HCT VFR BLD CALC: 24.3 % — LOW (ref 37–47)
HCT VFR BLDA CALC: 21.4 % — LOW (ref 34–44)
HGB BLD CALC-MCNC: 7 G/DL — LOW (ref 14–18)
HGB BLD-MCNC: 6.6 G/DL
HGB BLD-MCNC: 8.4 G/DL — LOW (ref 12–16)
LACTATE BLDV-MCNC: 1.7 MMOL/L — HIGH (ref 0.5–1.6)
MCHC RBC-ENTMCNC: 31.9 G/DL
MCHC RBC-ENTMCNC: 34.6 G/DL — SIGNIFICANT CHANGE UP (ref 32–37)
MCHC RBC-ENTMCNC: 35.3 PG — HIGH (ref 27–31)
MCHC RBC-ENTMCNC: 35.5 PG
MCV RBC AUTO: 102.1 FL — HIGH (ref 81–99)
MCV RBC AUTO: 111.3 FL
NRBC # BLD: 0 /100 WBCS — SIGNIFICANT CHANGE UP (ref 0–0)
PCO2 BLDV: 44 MMHG — SIGNIFICANT CHANGE UP (ref 41–51)
PH BLDV: 7.32 — SIGNIFICANT CHANGE UP (ref 7.26–7.43)
PLATELET # BLD AUTO: 832 K/UL — HIGH (ref 130–400)
PLATELET # BLD AUTO: 884 K/UL
PMV BLD: 9.5 FL
PO2 BLDV: 19 MMHG — LOW (ref 20–40)
POTASSIUM BLDV-SCNC: 5.6 MMOL/L — SIGNIFICANT CHANGE UP (ref 3.3–5.6)
POTASSIUM SERPL-MCNC: 5.3 MMOL/L — HIGH (ref 3.5–5)
POTASSIUM SERPL-MCNC: 6 MMOL/L — CRITICAL HIGH (ref 3.5–5)
POTASSIUM SERPL-SCNC: 5.3 MMOL/L — HIGH (ref 3.5–5)
POTASSIUM SERPL-SCNC: 6 MMOL/L — CRITICAL HIGH (ref 3.5–5)
POTASSIUM SERPL-SCNC: 6.4 MMOL/L
POTASSIUM SERPL-SCNC: 6.7 MMOL/L
PROT SERPL-MCNC: 6.4 G/DL — SIGNIFICANT CHANGE UP (ref 6–8)
PROT SERPL-MCNC: 6.7 G/DL
PROT SERPL-MCNC: 6.9 G/DL
RBC # BLD: 1.86 M/UL
RBC # BLD: 2.38 M/UL — LOW (ref 4.2–5.4)
RBC # FLD: 17.9 %
RBC # FLD: 21.2 % — HIGH (ref 11.5–14.5)
SAO2 % BLDV: 32 % — SIGNIFICANT CHANGE UP
SODIUM SERPL-SCNC: 133 MMOL/L
SODIUM SERPL-SCNC: 134 MMOL/L
SODIUM SERPL-SCNC: 135 MMOL/L — SIGNIFICANT CHANGE UP (ref 135–146)
SODIUM SERPL-SCNC: 137 MMOL/L — SIGNIFICANT CHANGE UP (ref 135–146)
WBC # BLD: 5.14 K/UL — SIGNIFICANT CHANGE UP (ref 4.8–10.8)
WBC # FLD AUTO: 4.86 K/UL
WBC # FLD AUTO: 5.14 K/UL — SIGNIFICANT CHANGE UP (ref 4.8–10.8)

## 2020-05-27 PROCEDURE — 99223 1ST HOSP IP/OBS HIGH 75: CPT

## 2020-05-27 PROCEDURE — 93010 ELECTROCARDIOGRAM REPORT: CPT

## 2020-05-27 PROCEDURE — 99285 EMERGENCY DEPT VISIT HI MDM: CPT | Mod: CS

## 2020-05-27 RX ORDER — DEXTROSE 10 % IN WATER 10 %
250 INTRAVENOUS SOLUTION INTRAVENOUS ONCE
Refills: 0 | Status: COMPLETED | OUTPATIENT
Start: 2020-05-27 | End: 2020-05-27

## 2020-05-27 RX ORDER — PREGABALIN 225 MG/1
1000 CAPSULE ORAL ONCE
Refills: 0 | Status: COMPLETED | OUTPATIENT
Start: 2020-05-27 | End: 2020-05-27

## 2020-05-27 RX ORDER — ONDANSETRON 8 MG/1
8 TABLET, FILM COATED ORAL ONCE
Refills: 0 | Status: COMPLETED | OUTPATIENT
Start: 2020-05-27 | End: 2020-05-27

## 2020-05-27 RX ORDER — INSULIN HUMAN 100 [IU]/ML
6 INJECTION, SOLUTION SUBCUTANEOUS ONCE
Refills: 0 | Status: COMPLETED | OUTPATIENT
Start: 2020-05-27 | End: 2020-05-27

## 2020-05-27 RX ORDER — LENALIDOMIDE 5 MG/1
5 CAPSULE ORAL DAILY
Refills: 0 | Status: DISCONTINUED | OUTPATIENT
Start: 2020-05-27 | End: 2020-05-28

## 2020-05-27 RX ORDER — ASPIRIN/CALCIUM CARB/MAGNESIUM 324 MG
81 TABLET ORAL DAILY
Refills: 0 | Status: DISCONTINUED | OUTPATIENT
Start: 2020-05-27 | End: 2020-05-29

## 2020-05-27 RX ORDER — CALCIUM GLUCONATE 100 MG/ML
1 VIAL (ML) INTRAVENOUS ONCE
Refills: 0 | Status: COMPLETED | OUTPATIENT
Start: 2020-05-27 | End: 2020-05-27

## 2020-05-27 RX ORDER — AZACITIDINE FOR 100 MG/1
123 INJECTION, POWDER, LYOPHILIZED, FOR SOLUTION INTRAVENOUS; SUBCUTANEOUS ONCE
Refills: 0 | Status: COMPLETED | OUTPATIENT
Start: 2020-05-27 | End: 2020-05-27

## 2020-05-27 RX ORDER — DEXTROSE 10 % IN WATER 10 %
1000 INTRAVENOUS SOLUTION INTRAVENOUS
Refills: 0 | Status: DISCONTINUED | OUTPATIENT
Start: 2020-05-27 | End: 2020-05-28

## 2020-05-27 RX ORDER — ENOXAPARIN SODIUM 100 MG/ML
40 INJECTION SUBCUTANEOUS DAILY
Refills: 0 | Status: DISCONTINUED | OUTPATIENT
Start: 2020-05-27 | End: 2020-05-29

## 2020-05-27 RX ORDER — DIPHENOXYLATE HCL/ATROPINE 2.5-.025MG
1 TABLET ORAL
Refills: 0 | Status: DISCONTINUED | OUTPATIENT
Start: 2020-05-27 | End: 2020-05-28

## 2020-05-27 RX ADMIN — Medication 100 MILLILITER(S): at 21:08

## 2020-05-27 RX ADMIN — ONDANSETRON 8 MILLIGRAM(S): 8 TABLET, FILM COATED ORAL at 11:07

## 2020-05-27 RX ADMIN — INSULIN HUMAN 6 UNIT(S): 100 INJECTION, SOLUTION SUBCUTANEOUS at 16:40

## 2020-05-27 RX ADMIN — Medication 1000 MILLILITER(S): at 16:42

## 2020-05-27 RX ADMIN — PREGABALIN 1000 MICROGRAM(S): 225 CAPSULE ORAL at 11:07

## 2020-05-27 RX ADMIN — Medication 100 GRAM(S): at 17:00

## 2020-05-27 RX ADMIN — AZACITIDINE FOR 123 MILLIGRAM(S): 100 INJECTION, POWDER, LYOPHILIZED, FOR SOLUTION INTRAVENOUS; SUBCUTANEOUS at 11:31

## 2020-05-27 NOTE — ED PROVIDER NOTE - OBJECTIVE STATEMENT
76 yo F hx of myelodysplastic syndrome c/o hyperkalemia. Patient went to [facility] today and was given a blood transfusion and sent to ED when Potassium was noted to be 6.7. Patient denies dizziness, CP, SOB, n/v, or abdominal pains.

## 2020-05-27 NOTE — H&P ADULT - HISTORY OF PRESENT ILLNESS
76 y/o FM w/ PMhx of Low-Risk MDS currently on Revlimid, Vidaza therapy (x5 days every 28 days) s/p 1 cycle, T2DM, HTN, B12 deficiency on Monthy B12 shots was sent from [facility] for hyperkalemia. Pt had a hb of 6.6 (baseline 7) and was scheduled to receive x1 unit PRBC. After transfusion pt's blood work showed K of 6.7 and was advised to come to ED. Pt has no complaints at this time. Denies N/V/D, fever, chils, sob, dizziness, confusion, palpitations, spasms or weakness.    in the ED, vitals stable, Labs showed Hb of 8.4 and k of 6 and EKG no changes. Was given Ca Gluconate x1 w/ Insulin and D50.

## 2020-05-27 NOTE — ED ADULT NURSE REASSESSMENT NOTE - NS ED NURSE REASSESS COMMENT FT1
Medication administered as ordered for hyperkalemia
PT blood sugar of 72 at 7:37pm so sandwich, pudding and juice given. Will continue to monitor.

## 2020-05-27 NOTE — ED PROVIDER NOTE - CLINICAL SUMMARY MEDICAL DECISION MAKING FREE TEXT BOX
76yo F history of MDS follows by onc presenting with hyperkalemia. Per pt, was at [facility] today, got a blood transfusion, bloodwork done, was told she was hyperkalemic and told to come in for further eval. No recent illness, f/c/n/v/d, abdominal pain, chest pain, shortness of breath, numbness/focal weakness. No history of renal failure. labs ekg reviewed. will give rx for hyper k, admit.

## 2020-05-27 NOTE — ED PROVIDER NOTE - ATTENDING CONTRIBUTION TO CARE
74yo F history of MDS follows by onc presenting with hyperkalemia. Per pt, was at [facility] today, got a blood transfusion, bloodwork done, was told she was hyperkalemic and told to come in for further eval. No recent illness, f/c/n/v/d, abdominal pain, chest pain, shortness of breath, numbness/focal weakness. No history of renal failure.  Constitutional: Well appearing. No acute distress. Non toxic.   Eyes: PERRLA. Extraocular movements intact, no entrapment. Conjunctiva normal.   ENT: No nasal discharge. Moist mucus membranes.  Neck: Supple, non tender, full range of motion.  CV: RRR no murmurs, rubs, or gallops. +S1S2.   Pulm: Clear to auscultation bilaterally. Normal work of breathing.  Abd: soft NT ND +BS.   Ext: Warm and well perfused x4, moving all extremities, no edema.   Psy: Cooperative, appropriate.   Skin: Warm, dry, no rash  Neuro: CN2-12 grossly intact no sensory or motor deficits throughout, no drift,

## 2020-05-27 NOTE — ED PROVIDER NOTE - PHYSICAL EXAMINATION
Gen: Alert, NAD, well appearing  Head: NC, AT, PERRL, EOMI, normal lids/conjunctiva  ENT: normal hearing  Neck: +supple, no tenderness/meningismus,  Pulm: Bilateral BS, normal resp effort, no wheeze/stridor/retractions  CV: RRR, no murmer  Abd: soft, NT/ND  Mskel: no edema/erythema/cyanosis  Skin: no rash, warm/dry  Neuro: AAOx3, no sensory/motor deficits

## 2020-05-27 NOTE — ED PROVIDER NOTE - NS ED ROS FT
Review of Systems    Constitutional: (-) fever, (-) chills  Eyes/ENT: (-) blurry vision, (-) epistaxis, (-) sore throat  Cardiovascular: (-) chest pain, (-) syncope  Respiratory: (-) cough, (-) shortness of breath  Gastrointestinal: (-) pain, (-) nausea, (-) vomiting, (-) diarrhea  Musculoskeletal: (-) neck pain, (-) back pain, (-) body aches  Integumentary: (-) rash, (-) edema  Neurological: (-) headache, (-) altered mental status  Psychiatric: (-) hallucinations  Allergic/Immunologic: (-) pruritus

## 2020-05-27 NOTE — H&P ADULT - ATTENDING COMMENTS
I saw and evaluated the patient. I have reviewed and agree with the findings and plan of care as documented above in the resident’s note (unless indicated differently below). Any necessary changes were made in the body of the text.    76 yo F pt p/w hyperkalemia. Patient has a history of MDS (is on lenalidomide and azacitidine tx) and was scheduled to receive a unit of pRBC’s for a hgb level of 6.6 (out-pt at the infusion center). Post-transfusion blood work revealed hyperkalemia (K of 6.7) and so the patient was referred to the ED for further treatment. At the time of this evaluation, the patient denied having any acute concerns or complaints. ROS was negative for fevers, chills, SOB, coughing, nausea, vomiting, diarrhea and dysuria.     ROS:  Constitutional: no fevers; no chills  Eyes: no conjunctivitis; no itching  ENT: no dysphagia; no odynophagia  CVS: no LE swelling; no PND; no chest pain  Resp: no SOB; no coughing  GI: no nausea; no vomiting; no abd pain; no diarrhea  : no dysuria; no hematuria  MSK: no myalgias; no arthralgias  Neuro: no focal weakness; no numbness/tingling  Skin: no rashes; no skin changes   All other systems reviewed and are negative    PMHx and meds: MDS (on lenalidomide and azacitidine tx), DM (on glimepiride 2 mg daily and metformin 500 mg BID), HTN (on quinapril 40 mg daily), B12 deficiency (on monthly injections), ASA 81 mg daily  SurHx: denies  FHx: HTN  SocHx: denies tobacco, alcohol and illicit drug use    Exam:  Vitals: BP = 116/56; P = 74; T = 97.4; RR 16; SpO2 of 99 on room air  General: appears stated age; cooperative; pleasant  Eyes: anicteric sclerae; moist conjunctiva  HENT: AT/NC; clear oropharynx; moist mucous membranes  Neck: supple; trachea midline; no JVD  Lungs: lungs cta b/l; no wheezing, rales or rhonci  CVS: RRR; S1 and S2 w/o MRG’s  Abd: BS+; soft; non-tender to palpation x 4; no masses or HSM  Extremities: no peripheral edema; pulses 2+ b/l  Neuro: CN II-XII intact; str 5/5 throughout; sensation grossly intact  Psych: appropriate affect; alert and oriented to person, place, time and situation    Labs significant for H&H 8.4/24.3, .1, , K 6 > 5.3, BUN/Cr 34/1.3, bicarb 19  EKG: NSR; qTC 427    Assessment:  (1) Hyperkalemia – likely transfusion associated [no EKG changes] - resolving  (2) DM w/ hyperglycemia  (3) Macrocytic anemia – hx of B12 deficiency (on tx) and MDS (on lenalidomide and azacitidine)  (4) Thrombocytosis likely reactive  (5) CKD 3 – baseline renal function    Plan:  (1) Repeat BMP w/ AM labs  (2) Hold ACEI for now  (3) K restricted diet  (4) Blood glucose monitoring AC & HS: coverage w/ insulin (goal BG < 180 mg/dL)   (5) Meds as dosed  (6) D/c planning in AM if remains stable, if hyperkalemia resolves and w/ close out-pt f/u     Code status: full code

## 2020-05-27 NOTE — H&P ADULT - ASSESSMENT
76 y/o FM w/ PMhx of Low-Risk MDS currently on Revlimid, Vidaza therapy (x5 days every 28 days) s/p 1 cycle, T2DM, HTN, B12 deficiency on Monthy B12 shots was sent from [facility] for hyperkalemia. Pt had a hb of 6.6 (baseline 7) and was scheduled to receive x1 unit PRBC. After transfusion pt's blood work showed K of 6.7 and was advised to come to ED. Pt has no complaints at this time. Denies N/V/D, fever, chils, sob, dizziness, confusion, palpitations, spasms or weakness. In the ED, vitals stable, Labs showed Hb of 8.4 and k of 6 and EKG no changes. Was given Ca Gluconate x1 w/ Insulin and D50.    #Hyperkalemia w/o EKG changes likely d/t Recent transfusion of PRBC - Resolving  - K 6.7 -> 6  - s/p Insulin, D50 and Ca Gluconate  - will hold ACEI  - will start K restricted diet  - f/u repeat BMP at 8pm    #Chronic Macrocyctic anemia d/t MDS and B12 deficiency  - Hb baseline 7  - this admission 8.4 s/p 1 unit PRBC at [facility]  - transfuse if Hb <7  - no active bleed  - on monthly b12 shots    #t2DM  - start insulin if FS >180    #HTN  - contrlled  - holding ACEi    #Activiy: OOBC  #Diet; carb consistent  #DVT/GI PPX: Lovenox/protonix  #Code: Full  #CHG

## 2020-05-27 NOTE — ED ADULT NURSE NOTE - OBJECTIVE STATEMENT
. Patient was sent from  [facility] today and was given a blood transfusion  Potassium was noted to be 6.7.Hx of myelodysplastic syndrome c/o hyperkalemia

## 2020-05-27 NOTE — ED ADULT TRIAGE NOTE - CHIEF COMPLAINT QUOTE
Patient sent in from [facility] for high Potasium 6.7 this morning. Patient has no complaints at this time

## 2020-05-27 NOTE — ED ADULT NURSE NOTE - CHIEF COMPLAINT QUOTE
Patient sent in from [facility] for high Potasium 6.7 this morning. Patient has no complaints at this time

## 2020-05-28 ENCOUNTER — APPOINTMENT (OUTPATIENT)
Dept: INFUSION THERAPY | Facility: CLINIC | Age: 75
End: 2020-05-28

## 2020-05-28 ENCOUNTER — TRANSCRIPTION ENCOUNTER (OUTPATIENT)
Age: 75
End: 2020-05-28

## 2020-05-28 LAB
ANION GAP SERPL CALC-SCNC: 14 MMOL/L — SIGNIFICANT CHANGE UP (ref 7–14)
ANION GAP SERPL CALC-SCNC: 14 MMOL/L — SIGNIFICANT CHANGE UP (ref 7–14)
B-OH-BUTYR SERPL-SCNC: <0.2 MMOL/L — SIGNIFICANT CHANGE UP
BASOPHILS # BLD AUTO: 0.09 K/UL — SIGNIFICANT CHANGE UP (ref 0–0.2)
BASOPHILS NFR BLD AUTO: 2.2 % — HIGH (ref 0–1)
BUN SERPL-MCNC: 29 MG/DL — HIGH (ref 10–20)
BUN SERPL-MCNC: 33 MG/DL — HIGH (ref 10–20)
CALCIUM SERPL-MCNC: 9.2 MG/DL — SIGNIFICANT CHANGE UP (ref 8.5–10.1)
CALCIUM SERPL-MCNC: 9.3 MG/DL — SIGNIFICANT CHANGE UP (ref 8.5–10.1)
CHLORIDE SERPL-SCNC: 103 MMOL/L — SIGNIFICANT CHANGE UP (ref 98–110)
CHLORIDE SERPL-SCNC: 107 MMOL/L — SIGNIFICANT CHANGE UP (ref 98–110)
CO2 SERPL-SCNC: 15 MMOL/L — LOW (ref 17–32)
CO2 SERPL-SCNC: 16 MMOL/L — LOW (ref 17–32)
CREAT SERPL-MCNC: 1.4 MG/DL — SIGNIFICANT CHANGE UP (ref 0.7–1.5)
CREAT SERPL-MCNC: 1.4 MG/DL — SIGNIFICANT CHANGE UP (ref 0.7–1.5)
EOSINOPHIL # BLD AUTO: 0.24 K/UL — SIGNIFICANT CHANGE UP (ref 0–0.7)
EOSINOPHIL NFR BLD AUTO: 5.9 % — SIGNIFICANT CHANGE UP (ref 0–8)
GLUCOSE BLDC GLUCOMTR-MCNC: 117 MG/DL — HIGH (ref 70–99)
GLUCOSE BLDC GLUCOMTR-MCNC: 130 MG/DL — HIGH (ref 70–99)
GLUCOSE BLDC GLUCOMTR-MCNC: 163 MG/DL — HIGH (ref 70–99)
GLUCOSE BLDC GLUCOMTR-MCNC: 167 MG/DL — HIGH (ref 70–99)
GLUCOSE BLDC GLUCOMTR-MCNC: 258 MG/DL — HIGH (ref 70–99)
GLUCOSE BLDC GLUCOMTR-MCNC: 271 MG/DL — HIGH (ref 70–99)
GLUCOSE BLDC GLUCOMTR-MCNC: 277 MG/DL — HIGH (ref 70–99)
GLUCOSE SERPL-MCNC: 104 MG/DL — HIGH (ref 70–99)
GLUCOSE SERPL-MCNC: 316 MG/DL — HIGH (ref 70–99)
HCT VFR BLD CALC: 23.7 % — LOW (ref 37–47)
HGB BLD-MCNC: 7.9 G/DL — LOW (ref 12–16)
IMM GRANULOCYTES NFR BLD AUTO: 0.5 % — HIGH (ref 0.1–0.3)
LYMPHOCYTES # BLD AUTO: 0.82 K/UL — LOW (ref 1.2–3.4)
LYMPHOCYTES # BLD AUTO: 20.1 % — LOW (ref 20.5–51.1)
MCHC RBC-ENTMCNC: 33.3 G/DL — SIGNIFICANT CHANGE UP (ref 32–37)
MCHC RBC-ENTMCNC: 33.9 PG — HIGH (ref 27–31)
MCV RBC AUTO: 101.7 FL — HIGH (ref 81–99)
MONOCYTES # BLD AUTO: 0.42 K/UL — SIGNIFICANT CHANGE UP (ref 0.1–0.6)
MONOCYTES NFR BLD AUTO: 10.3 % — HIGH (ref 1.7–9.3)
NEUTROPHILS # BLD AUTO: 2.48 K/UL — SIGNIFICANT CHANGE UP (ref 1.4–6.5)
NEUTROPHILS NFR BLD AUTO: 61 % — SIGNIFICANT CHANGE UP (ref 42.2–75.2)
NRBC # BLD: 0 /100 WBCS — SIGNIFICANT CHANGE UP (ref 0–0)
PHOSPHATE SERPL-MCNC: 4.2 MG/DL — SIGNIFICANT CHANGE UP (ref 2.1–4.9)
PLATELET # BLD AUTO: 791 K/UL — HIGH (ref 130–400)
POTASSIUM SERPL-MCNC: 5.7 MMOL/L — HIGH (ref 3.5–5)
POTASSIUM SERPL-MCNC: 5.9 MMOL/L — HIGH (ref 3.5–5)
POTASSIUM SERPL-SCNC: 5.7 MMOL/L — HIGH (ref 3.5–5)
POTASSIUM SERPL-SCNC: 5.9 MMOL/L — HIGH (ref 3.5–5)
RBC # BLD: 2.33 M/UL — LOW (ref 4.2–5.4)
RBC # FLD: 23.5 % — HIGH (ref 11.5–14.5)
SARS-COV-2 RNA SPEC QL NAA+PROBE: SIGNIFICANT CHANGE UP
SODIUM SERPL-SCNC: 133 MMOL/L — LOW (ref 135–146)
SODIUM SERPL-SCNC: 136 MMOL/L — SIGNIFICANT CHANGE UP (ref 135–146)
URATE SERPL-MCNC: 9.2 MG/DL — HIGH (ref 2.5–7)
WBC # BLD: 4.07 K/UL — LOW (ref 4.8–10.8)
WBC # FLD AUTO: 4.07 K/UL — LOW (ref 4.8–10.8)

## 2020-05-28 PROCEDURE — 99222 1ST HOSP IP/OBS MODERATE 55: CPT

## 2020-05-28 PROCEDURE — 99233 SBSQ HOSP IP/OBS HIGH 50: CPT

## 2020-05-28 PROCEDURE — 93010 ELECTROCARDIOGRAM REPORT: CPT

## 2020-05-28 RX ORDER — SODIUM CHLORIDE 9 MG/ML
1000 INJECTION, SOLUTION INTRAVENOUS
Refills: 0 | Status: DISCONTINUED | OUTPATIENT
Start: 2020-05-28 | End: 2020-05-29

## 2020-05-28 RX ORDER — DEXTROSE 50 % IN WATER 50 %
25 SYRINGE (ML) INTRAVENOUS ONCE
Refills: 0 | Status: DISCONTINUED | OUTPATIENT
Start: 2020-05-28 | End: 2020-05-29

## 2020-05-28 RX ORDER — ONDANSETRON 8 MG/1
8 TABLET, FILM COATED ORAL ONCE
Refills: 0 | Status: COMPLETED | OUTPATIENT
Start: 2020-05-28 | End: 2020-05-28

## 2020-05-28 RX ORDER — GLUCAGON INJECTION, SOLUTION 0.5 MG/.1ML
1 INJECTION, SOLUTION SUBCUTANEOUS ONCE
Refills: 0 | Status: DISCONTINUED | OUTPATIENT
Start: 2020-05-28 | End: 2020-05-29

## 2020-05-28 RX ORDER — AZACITIDINE FOR 100 MG/1
123 INJECTION, POWDER, LYOPHILIZED, FOR SOLUTION INTRAVENOUS; SUBCUTANEOUS DAILY
Refills: 0 | Status: COMPLETED | OUTPATIENT
Start: 2020-05-28 | End: 2020-05-29

## 2020-05-28 RX ORDER — SODIUM ZIRCONIUM CYCLOSILICATE 10 G/10G
10 POWDER, FOR SUSPENSION ORAL ONCE
Refills: 0 | Status: COMPLETED | OUTPATIENT
Start: 2020-05-28 | End: 2020-05-28

## 2020-05-28 RX ORDER — SODIUM CHLORIDE 9 MG/ML
1000 INJECTION, SOLUTION INTRAVENOUS
Refills: 0 | Status: DISCONTINUED | OUTPATIENT
Start: 2020-05-28 | End: 2020-05-28

## 2020-05-28 RX ORDER — INSULIN HUMAN 100 [IU]/ML
10 INJECTION, SOLUTION SUBCUTANEOUS ONCE
Refills: 0 | Status: COMPLETED | OUTPATIENT
Start: 2020-05-28 | End: 2020-05-28

## 2020-05-28 RX ORDER — DEXTROSE 10 % IN WATER 10 %
250 INTRAVENOUS SOLUTION INTRAVENOUS ONCE
Refills: 0 | Status: COMPLETED | OUTPATIENT
Start: 2020-05-28 | End: 2020-05-28

## 2020-05-28 RX ORDER — DEXTROSE 50 % IN WATER 50 %
12.5 SYRINGE (ML) INTRAVENOUS ONCE
Refills: 0 | Status: DISCONTINUED | OUTPATIENT
Start: 2020-05-28 | End: 2020-05-29

## 2020-05-28 RX ORDER — ONDANSETRON 8 MG/1
8 TABLET, FILM COATED ORAL DAILY
Refills: 0 | Status: COMPLETED | OUTPATIENT
Start: 2020-05-28 | End: 2020-05-29

## 2020-05-28 RX ORDER — INSULIN LISPRO 100/ML
VIAL (ML) SUBCUTANEOUS
Refills: 0 | Status: DISCONTINUED | OUTPATIENT
Start: 2020-05-28 | End: 2020-05-29

## 2020-05-28 RX ORDER — DEXTROSE 50 % IN WATER 50 %
15 SYRINGE (ML) INTRAVENOUS ONCE
Refills: 0 | Status: DISCONTINUED | OUTPATIENT
Start: 2020-05-28 | End: 2020-05-29

## 2020-05-28 RX ADMIN — ENOXAPARIN SODIUM 40 MILLIGRAM(S): 100 INJECTION SUBCUTANEOUS at 11:30

## 2020-05-28 RX ADMIN — SODIUM ZIRCONIUM CYCLOSILICATE 10 GRAM(S): 10 POWDER, FOR SUSPENSION ORAL at 18:27

## 2020-05-28 RX ADMIN — ONDANSETRON 8 MILLIGRAM(S): 8 TABLET, FILM COATED ORAL at 14:44

## 2020-05-28 RX ADMIN — Medication 81 MILLIGRAM(S): at 11:30

## 2020-05-28 RX ADMIN — SODIUM ZIRCONIUM CYCLOSILICATE 10 GRAM(S): 10 POWDER, FOR SUSPENSION ORAL at 23:01

## 2020-05-28 RX ADMIN — INSULIN HUMAN 10 UNIT(S): 100 INJECTION, SOLUTION SUBCUTANEOUS at 10:54

## 2020-05-28 RX ADMIN — AZACITIDINE FOR 123 MILLIGRAM(S): 100 INJECTION, POWDER, LYOPHILIZED, FOR SOLUTION INTRAVENOUS; SUBCUTANEOUS at 16:30

## 2020-05-28 RX ADMIN — SODIUM CHLORIDE 100 MILLILITER(S): 9 INJECTION, SOLUTION INTRAVENOUS at 20:02

## 2020-05-28 RX ADMIN — Medication 500 MILLILITER(S): at 10:05

## 2020-05-28 RX ADMIN — Medication 1: at 16:56

## 2020-05-28 RX ADMIN — SODIUM CHLORIDE 100 MILLILITER(S): 9 INJECTION, SOLUTION INTRAVENOUS at 17:30

## 2020-05-28 NOTE — DISCHARGE NOTE PROVIDER - HOSPITAL COURSE
74 y/o FM w/ PMhx of Low-Risk MDS currently on Revlimid, Vidaza therapy (x5 days every 28 days) s/p 1 cycle, T2DM, HTN, B12 deficiency on Monthy B12 shots was sent from [facility] for hyperkalemia. Pt had a hb of 6.6 (baseline 7) and was scheduled to receive x1 unit PRBC. After transfusion pt's blood work showed K of 6.7 and was advised to come to ED. Pt has no complaints at this time. Denies N/V/D, fever, chils, sob, dizziness, confusion, palpitations, spasms or weakness. In the ED, vitals stable, Labs showed Hb of 8.4 and k of 6 and EKG no changes. Was given Ca Gluconate x1 w/ Insulin and D50; repeat K was 5.3 non-hemolyzed. She was stable to be discharged to home with her home medications to follow up with Dr. Wade for management of her MDS. 76 y/o FM w/ PMhx of Low-Risk MDS currently on Revlimid, Vidaza therapy (x5 days every 28 days) s/p 1 cycle, T2DM, HTN, B12 deficiency on Monthy B12 shots was sent from [facility] for hyperkalemia. Pt had a hb of 6.6 (baseline 7) and was scheduled to receive x1 unit PRBC. After transfusion pt's blood work showed K of 6.7 and was advised to come to ED. Pt has no complaints at this time. Denies N/V/D, fever, chils, sob, dizziness, confusion, palpitations, spasms or weakness. In the ED, vitals stable, Labs showed Hb of 8.4 and k of 6 and EKG no changes. Was given Ca Gluconate x1 w/ Insulin and D50; repeat K was 5.3 non-hemolyzed and on discharge was 4.8.    Pt hgb was 7.2 in AM of 5/29 - she remained asymptomatic without any SOB, dizziness, or tachycardia. She received an additional unit of PRBC and will follow up with Gerald.    She was also seen by Dr. Bell - she will be discharged on PO bicarbonate. No need for allopurinol at this time         She was stable to be discharged to home with her home medications to follow up with Dr. Wade for management of her MDS.

## 2020-05-28 NOTE — PROGRESS NOTE ADULT - SUBJECTIVE AND OBJECTIVE BOX
SIMONA KUHN 75y Female  MRN#: [id number]       SUBJECTIVE  Patient is a 75y old Female who presents with a chief complaint of Hyperkalemia (28 May 2020 12:58)  Currently admitted to medicine with the primary diagnosis of hyperkalemia possibly secondary to tumor lysis syndrome secondary to medication. In the AM the patient was resting comfortably; ambulating well, tolerating diet.      OBJECTIVE  PAST MEDICAL & SURGICAL HISTORY  MDS (myelodysplastic syndrome)  DM (diabetes mellitus)  No significant past surgical history    ALLERGIES:  No Known Allergies    MEDICATIONS:  STANDING MEDICATIONS  aspirin  chewable 81 milliGRAM(s) Oral daily  enoxaparin Injectable 40 milliGRAM(s) SubCutaneous daily  sodium chloride 0.9% 1000 milliLiter(s) IV Continuous <Continuous>    PRN MEDICATIONS  diphenoxylate/atropine 1 Tablet(s) Oral four times a day PRN      VITAL SIGNS: Last 24 Hours  T(C): 36.4 (28 May 2020 08:14), Max: 37.1 (27 May 2020 13:47)  T(F): 97.6 (28 May 2020 08:14), Max: 98.7 (27 May 2020 13:47)  HR: 81 (28 May 2020 08:14) (72 - 92)  BP: 121/58 (28 May 2020 08:14) (113/53 - 138/58)  BP(mean): 76 (28 May 2020 06:00) (72 - 83)  RR: 17 (28 May 2020 08:14) (15 - 18)  SpO2: 99% (28 May 2020 08:14) (96% - 99%)    LABS:                        7.9    4.07  )-----------( 791      ( 28 May 2020 04:30 )             23.7     05-28    133<L>  |  103  |  29<H>  ----------------------------<  316<H>  5.9<H>   |  16<L>  |  1.4    Ca    9.2      28 May 2020 11:01    TPro  6.4  /  Alb  4.2  /  TBili  1.1  /  DBili  x   /  AST  8   /  ALT  7   /  AlkPhos  79  05-27      PHYSICAL EXAM:    GENERAL: NAD, well-developed, AAOx3  HEENT:  Atraumatic, Normocephalic  PULMONARY: Clear to auscultation bilaterally; No wheeze  CARDIOVASCULAR: Regular rate and rhythm; No murmurs, rubs, or gallops  GASTROINTESTINAL: Soft, Nontender, Nondistended; Bowel sounds present  MUSCULOSKELETAL: No clubbing, cyanosis, or edema  NEUROLOGY: non-focal  SKIN: No rashes or lesions

## 2020-05-28 NOTE — PROGRESS NOTE ADULT - ASSESSMENT
76 y/o FM w/ PMhx of Low-Risk MDS currently on Revlimid, Vidaza therapy (x5 days every 28 days) s/p 1 cycle, T2DM, HTN, B12 deficiency on Monthy B12 shots was sent from [facility] for hyperkalemia s/p insulin/d10, possibly secondary to tumor lysis syndrome secondary to chemo med.    #Hyperkalemia possibly secondary to tumor lysis syndrome secondary to medication  - K 6.7 -> 6 ->> 5.7 -> 5.9  - s/p Insulin, D50 and Ca Gluconate  - will hold ACEI  - will start K restricted diet  - trend BMPs, f/u phosphorus, uric acid  - f/u EKG  - NS w/ 3 amps bicarb @ 100cc/hr  - F/u Heme/Onc recs    #Chronic Macrocyctic anemia d/t MDS and B12 deficiency  - Hb baseline 7  - this admission 8.4 s/p 1 unit PRBC at [facility]  - transfuse if Hb <7  - no active bleed  - on monthly b12 shots    #t2DM  - start insulin if FS >180    #HTN  - contrlled  - holding ACEi    #Activiy: OOBC  #Diet; carb consistent  #DVT/GI PPX: Lovenox/protonix  #Code: Full  #CHG

## 2020-05-28 NOTE — CONSULT NOTE ADULT - ASSESSMENT
74 y/o FM w/ PMhx of Low-Risk MDS currently on Vidaza therapy (x5 days every 28 days) s/p 1 cycle, T2DM, HTN, B12 deficiency on Monthy B12 shots was sent from [facility] for hyperkalemia.     Pt has h/o Low risk MDS and is getting Vidaza 5 days every 28 days . She started her second cycle on 5/26/20 , received 2 doses and now is admitted with hyperkalemia .     # Hyperkalemia/ h/o CKD III / Creatinine around baseline / Blood sugars were well controlled on admission    -- etiology unclear normal kidney function , did not start taking any new medications, ACE inhibitors held by team , Doubt pesudohyperkalemia as pt's blood glucose levels have been well controlled .   -- agree with low K diet .  -- doubt hypoaldosteronism as normal blood pressure .   -- no concern for Tumor lysis.  --consider starting K binder.  -- Hold off on atropine that pt was taking for diarrhea as hyperkalemia in the presence of bradycardia can lead to bradycardia .  --has elevated blood sugars with hyperkalemia , hponatremia , low bicarb ? metabolic acidosis .  -- strict glycemic control .  --Nephro eval for metabolic acidosis and persistent hyperkalemia .       # Low Risk MDS s/p treatment with Revlimed / More recently started Vidaza s/p 1st cycle:  --since pt already started second cycle and is admitted in hospital , we will c/w vidaza in house .  --Scheduled for D3 today , will proceed with it , consent obtained and placed in chart .  -- Doubt persistent hyperkalemia is sec to Vidaza , as review of literature is not suggestive of vidaza's association with hyperkalemia .  -- Will proceed with D4 tomorrow in am .  --Pt is scheduled to get D5 on 6/1/20 as out pt .     # Normocytic anemia : From MDS :  - s/p 1 unit PRBC on 5/27/20 .   - Monitor counts , if < 7 transfuse blood .  - Also get procrit wkly at [facility] .     Plan discussed with medical resident . 76 y/o FM w/ PMhx of Low-Risk MDS currently on Vidaza therapy (x5 days every 28 days) s/p 1 cycle, T2DM, HTN, B12 deficiency on Monthy B12 shots was sent from [facility] for hyperkalemia.     Pt has h/o Low risk MDS and is getting Vidaza 5 days every 28 days . She started her second cycle on 5/26/20 , received 2 doses and now is admitted with hyperkalemia .     # Hyperkalemia/ h/o CKD III / Creatinine around baseline / Blood sugars were well controlled on admission    -- etiology unclear normal kidney function , did not start taking any new medications, ACE inhibitors held by team , Doubt pesudohyperkalemia as pt's blood glucose levels have been well controlled / Thrombocytosis ? elevated platelet counts .   -- agree with low K diet .  -- doubt hypoaldosteronism as normal blood pressure .   -- no concern for Tumor lysis.  --consider starting K binder.  -- Hold off on atropine that pt was taking for diarrhea as hyperkalemia in the presence of bradycardia can lead to bradycardia .  --has elevated blood sugars with hyperkalemia , hponatremia , low bicarb ? metabolic acidosis .  -- strict glycemic control .  --Nephro eval for metabolic acidosis and persistent hyperkalemia .       # Low Risk MDS s/p treatment with Revlimed / More recently started Vidaza s/p 1st cycle:  --since pt already started second cycle and is admitted in hospital , we will c/w vidaza in house .  --Scheduled for D3 today , will proceed with it , consent obtained and placed in chart .  -- Doubt persistent hyperkalemia is sec to Vidaza , as review of literature is not suggestive of vidaza's association with hyperkalemia .  -- Will proceed with D4 tomorrow in am .  --Pt is scheduled to get D5 on 6/1/20 as out pt .     # Normocytic anemia : From MDS :  - s/p 1 unit PRBC on 5/27/20 .   - Monitor counts , if < 7 transfuse blood .  - Also get procrit wkly at [facility] .     Plan discussed with medical resident . 76 y/o FM w/ PMhx of Low-Risk MDS currently on Vidaza therapy (x5 days every 28 days) s/p 1 cycle, T2DM, HTN, B12 deficiency on Monthy B12 shots was sent from [facility] for hyperkalemia.     Pt has h/o Low risk MDS and is getting Vidaza 5 days every 28 days . She started her second cycle on 5/26/20 , received 2 doses and now is admitted with hyperkalemia .     # Hyperkalemia/ h/o CKD III / Creatinine around baseline / Blood sugars were well controlled on admission    -- etiology unclear normal kidney function , did not start taking any new medications, ACE inhibitors held by team ,   Could be pesudohyperkalemia secondary to Thrombocytosis : check plasma k  -- agree with low K diet .  -- no concern for Tumor lysis.  --consider starting K binder.  -- Hold off on atropine that pt was taking for diarrhea as hyperkalemia in the presence of bradycardia can lead to bradycardia .  --has elevated blood sugars with hyperkalemia , hponatremia , low bicarb ? metabolic acidosis .  -- strict glycemic control .  --Nephro eval for metabolic acidosis and persistent hyperkalemia .       # Low Risk MDS s/p treatment with Revlimed / More recently started Vidaza s/p 1st cycle:  --since pt already started second cycle and is admitted in hospital , we will c/w vidaza in house .  --Scheduled for D3 today , will proceed with it , consent obtained and placed in chart .  -- Doubt persistent hyperkalemia is sec to Vidaza , as review of literature is not suggestive of vidaza's association with hyperkalemia .  -- Will proceed with D4 tomorrow in am .  --Pt is scheduled to get D5 on 6/1/20 as out pt .     # Normocytic anemia : From MDS :  - s/p 1 unit PRBC on 5/27/20 .   - Monitor counts , if < 7 transfuse blood .  - Also get procrit wkly at [facility] .     Plan discussed with medical resident .

## 2020-05-28 NOTE — PROGRESS NOTE ADULT - SUBJECTIVE AND OBJECTIVE BOX
pt seen and examined.   feels ok, is worried that she is missing her chemo today    Vital Signs Last 24 Hrs  T(C): 36.4 (28 May 2020 08:14), Max: 37.1 (27 May 2020 13:47)  T(F): 97.6 (28 May 2020 08:14), Max: 98.7 (27 May 2020 13:47)  HR: 81 (28 May 2020 08:14) (72 - 92)  BP: 121/58 (28 May 2020 08:14) (113/53 - 138/58)  BP(mean): 76 (28 May 2020 06:00) (72 - 83)  RR: 17 (28 May 2020 08:14) (15 - 18)  SpO2: 99% (28 May 2020 08:14) (96% - 99%)    Physical exam:   constitutional NAD, AAOX3, Respiratory  lungs CTA, CVS heart RRR, GI: abdomen Soft NT, ND, BS+, skin: intact  neuro exam non focal.     Home Medications:  aspirin 81 mg oral tablet, chewable: 1 tab(s) orally once a day (27 May 2020 17:13)  diphenoxylate-atropine 2.5 mg-0.025 mg oral tablet: 1 tab(s) orally 4 times a day, As Needed for diarrehea (27 May 2020 17:13)  glimepiride 2 mg oral tablet: 1 tab(s) orally once a day (27 May 2020 17:13)  metFORMIN: 500 milligram(s) orally 2 times a day (27 May 2020 17:13)  quinapril 20 mg oral tablet: 2 tab(s) orally once a day (27 May 2020 17:13)  Revlimid 5 mg oral capsule: 1 cap(s) orally once a day (27 May 2020 17:13)                          7.9    4.07  )-----------( 791      ( 28 May 2020 04:30 )             23.7     05-28    133<L>  |  103  |  29<H>  ----------------------------<  316<H>  5.9<H>   |  16<L>  |  1.4    Ca    9.2      28 May 2020 11:01    TPro  6.4  /  Alb  4.2  /  TBili  1.1  /  DBili  x   /  AST  8   /  ALT  7   /  AlkPhos  79  05-27    1.4  1.4  1.4  1.3    a/p  #Hyperkalemia, rule out tumor lysis syndrome, poss due to chemo meds, insulin and glucose given. iv fluids. repeat labs, check ecg, cont tele until k is down. check uric acid, and phosphorus. sw oncology team, they will see pt.   #CKD3, stable , monitor labs  #pseudoHyponatremia due to hyperglycemia,   #DM , uncontrolled, insulin per protocol   #MDS (myelodysplastic syndrome), oncology follow up     Full code pt seen and examined.   feels ok, is worried that she is missing her chemo today    Vital Signs Last 24 Hrs  T(C): 36.4 (28 May 2020 08:14), Max: 37.1 (27 May 2020 13:47)  T(F): 97.6 (28 May 2020 08:14), Max: 98.7 (27 May 2020 13:47)  HR: 81 (28 May 2020 08:14) (72 - 92)  BP: 121/58 (28 May 2020 08:14) (113/53 - 138/58)  BP(mean): 76 (28 May 2020 06:00) (72 - 83)  RR: 17 (28 May 2020 08:14) (15 - 18)  SpO2: 99% (28 May 2020 08:14) (96% - 99%)    Physical exam:   constitutional NAD, AAOX3, Respiratory  lungs CTA, CVS heart RRR, GI: abdomen Soft NT, ND, BS+, skin: intact  neuro exam non focal.     Home Medications:  aspirin 81 mg oral tablet, chewable: 1 tab(s) orally once a day (27 May 2020 17:13)  diphenoxylate-atropine 2.5 mg-0.025 mg oral tablet: 1 tab(s) orally 4 times a day, As Needed for diarrehea (27 May 2020 17:13)  glimepiride 2 mg oral tablet: 1 tab(s) orally once a day (27 May 2020 17:13)  metFORMIN: 500 milligram(s) orally 2 times a day (27 May 2020 17:13)  quinapril 20 mg oral tablet: 2 tab(s) orally once a day (27 May 2020 17:13)  Revlimid 5 mg oral capsule: 1 cap(s) orally once a day (27 May 2020 17:13)                          7.9    4.07  )-----------( 791      ( 28 May 2020 04:30 )             23.7     05-28    133<L>  |  103  |  29<H>  ----------------------------<  316<H>  5.9<H>   |  16<L>  |  1.4    Ca    9.2      28 May 2020 11:01    TPro  6.4  /  Alb  4.2  /  TBili  1.1  /  DBili  x   /  AST  8   /  ALT  7   /  AlkPhos  79  05-27    1.4  1.4  1.4  1.3    a/p  #Hyperkalemia, rule out tumor lysis syndrome, poss due to chemo meds, insulin and glucose given. iv fluids. repeat labs, check ecg, cont tele until k is down. check uric acid, and phosphorus. sw oncology team, they will see pt.   #CKD3, stable , monitor labs  #pseudoHyponatremia due to hyperglycemia,   #DM , uncontrolled, insulin per protocol   #MDS (myelodysplastic syndrome), oncology follow up   #acidosis, fu nephro, rule out dka,     Full code pt seen and examined.   feels ok, is worried that she is missing her chemo today    Vital Signs Last 24 Hrs  T(C): 36.4 (28 May 2020 08:14), Max: 37.1 (27 May 2020 13:47)  T(F): 97.6 (28 May 2020 08:14), Max: 98.7 (27 May 2020 13:47)  HR: 81 (28 May 2020 08:14) (72 - 92)  BP: 121/58 (28 May 2020 08:14) (113/53 - 138/58)  BP(mean): 76 (28 May 2020 06:00) (72 - 83)  RR: 17 (28 May 2020 08:14) (15 - 18)  SpO2: 99% (28 May 2020 08:14) (96% - 99%)    Physical exam:   constitutional NAD, AAOX3, Respiratory  lungs CTA, CVS heart RRR, GI: abdomen Soft NT, ND, BS+, skin: intact  neuro exam non focal.     Home Medications:  aspirin 81 mg oral tablet, chewable: 1 tab(s) orally once a day (27 May 2020 17:13)  diphenoxylate-atropine 2.5 mg-0.025 mg oral tablet: 1 tab(s) orally 4 times a day, As Needed for diarrehea (27 May 2020 17:13)  glimepiride 2 mg oral tablet: 1 tab(s) orally once a day (27 May 2020 17:13)  metFORMIN: 500 milligram(s) orally 2 times a day (27 May 2020 17:13)  quinapril 20 mg oral tablet: 2 tab(s) orally once a day (27 May 2020 17:13)  Revlimid 5 mg oral capsule: 1 cap(s) orally once a day (27 May 2020 17:13)                          7.9    4.07  )-----------( 791      ( 28 May 2020 04:30 )             23.7     05-28    133<L>  |  103  |  29<H>  ----------------------------<  316<H>  5.9<H>   |  16<L>  |  1.4    Ca    9.2      28 May 2020 11:01    TPro  6.4  /  Alb  4.2  /  TBili  1.1  /  DBili  x   /  AST  8   /  ALT  7   /  AlkPhos  79  05-27    1.4  1.4  1.4  1.3    a/p  #Hyperkalemia, rule out tumor lysis syndrome, poss due to chemo meds, insulin and glucose given. iv fluids. repeat labs, check ecg, cont tele until k is down. check uric acid, and phosphorus. sw oncology team, they will see pt.   #CKD3, stable , monitor labs  #pseudoHyponatremia due to hyperglycemia,   #DM , uncontrolled, insulin per protocol   #MDS (myelodysplastic syndrome), oncology follow up   #acidosis, fu nephro, check lactic acid, cont ivf,  rule out dka, repeat labs    Full code  spoke with nephrologist

## 2020-05-28 NOTE — DISCHARGE NOTE PROVIDER - NSDCFUADDAPPT_GEN_ALL_CORE_FT
Please get a Basic Metabolic Panel in 1-2 weeks and follow up with your PCP and nephrologist.     Please restrict potassium intake - limit veggie juicing.

## 2020-05-28 NOTE — DISCHARGE NOTE PROVIDER - NSDCMRMEDTOKEN_GEN_ALL_CORE_FT
aspirin 81 mg oral tablet, chewable: 1 tab(s) orally once a day  diphenoxylate-atropine 2.5 mg-0.025 mg oral tablet: 1 tab(s) orally 4 times a day, As Needed for diarrehea  glimepiride 2 mg oral tablet: 1 tab(s) orally once a day  metFORMIN: 500 milligram(s) orally 2 times a day  quinapril 20 mg oral tablet: 2 tab(s) orally once a day  Revlimid 5 mg oral capsule: 1 cap(s) orally once a day aspirin 81 mg oral tablet, chewable: 1 tab(s) orally once a day  diphenoxylate-atropine 2.5 mg-0.025 mg oral tablet: 1 tab(s) orally 4 times a day, As Needed for diarrehea  glimepiride 2 mg oral tablet: 1 tab(s) orally once a day  metFORMIN: 500 milligram(s) orally 2 times a day  quinapril 20 mg oral tablet: 2 tab(s) orally once a day  Revlimid 5 mg oral capsule: 1 cap(s) orally once a day  sodium bicarbonate 650 mg oral tablet: 1 tab(s) orally 2 times a day

## 2020-05-28 NOTE — DISCHARGE NOTE PROVIDER - PROVIDER TOKENS
PROVIDER:[TOKEN:[[id number]],FOLLOWUP:[1 week],ESTABLISHEDPATIENT:[T]],PROVIDER:[TOKEN:[[id number]],FOLLOWUP:[1 week],ESTABLISHEDPATIENT:[T]] PROVIDER:[TOKEN:[[id number]],FOLLOWUP:[1 week],ESTABLISHEDPATIENT:[T]],PROVIDER:[TOKEN:[[id number]],FOLLOWUP:[1 week],ESTABLISHEDPATIENT:[T]],PROVIDER:[TOKEN:[[id number]],FOLLOWUP:[1 week]]

## 2020-05-28 NOTE — CONSULT NOTE ADULT - SUBJECTIVE AND OBJECTIVE BOX
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 Pt had a hb of 6.6 (baseline 7) and was scheduled to receive x1 unit PRBC. After transfusion pt's blood work showed K of 6.7 and was advised to come to ED. Pt has no complaints at this time. Denies N/V/D, fever, chils, sob, dizziness, confusion, palpitations, spasms or weakness.    in the ED, vitals stable, Labs showed Hb of 8.4 and k of 6 and EKG no changes. Was given Ca Gluconate x1 w/ Insulin and D50. (27 May 2020 16:56)    Pt was diagnosed with MDS in 2018 , initially was treated with revlimed , also gets procrit wkly . Pt was recently switched to Vidaza SC for 5days , every 28 days . She was scheduled to gat her second cycle this wk, received 2 doses on Tuesday and Wednesday , also received a transfusion . Cmp done at [facility] showed K of 6 so pt was referred to ER . In ER she was given insulin , dextrose and calcium gluconate . Pt feels fine and has no new complaints .          PAST MEDICAL & SURGICAL HISTORY:  MDS (myelodysplastic syndrome)  DM (diabetes mellitus)  No significant past surgical history      SOCIAL HISTORY: No h/o smoking , alcohol or drug use     FAMILY HISTORY: No pertinent family history     Allergies    No Known Allergies      HOME MEDICATIONS:  aspirin 81 mg oral tablet, chewable: 1 tab(s) orally once a day (27 May 2020 17:13)  diphenoxylate-atropine 2.5 mg-0.025 mg oral tablet: 1 tab(s) orally 4 times a day, As Needed for diarrehea (27 May 2020 17:13)  glimepiride 2 mg oral tablet: 1 tab(s) orally once a day (27 May 2020 17:13)  metFORMIN: 500 milligram(s) orally 2 times a day (27 May 2020 17:13)  quinapril 20 mg oral tablet: 2 tab(s) orally once a day (27 May 2020 17:13)  Revlimid 5 mg oral capsule: 1 cap(s) orally once a day (27 May 2020 17:13)      Vital Signs Last 24 Hrs  T(C): 36.4 (28 May 2020 08:14), Max: 36.4 (28 May 2020 06:00)  T(F): 97.6 (28 May 2020 08:14), Max: 97.6 (28 May 2020 06:00)  HR: 81 (28 May 2020 08:14) (74 - 81)  BP: 121/58 (28 May 2020 08:14) (113/53 - 121/58)  BP(mean): 76 (28 May 2020 06:00) (72 - 76)  RR: 17 (28 May 2020 08:14) (15 - 17)  SpO2: 99% (28 May 2020 08:14) (98% - 99%)    PHYSICAL EXAM  General: adult in NAD  HEENT: pale conjuctiva  CV: normal S1/S2   Lungs: positive air movement b/l ant lungs,clear to auscultation, no wheezes, no rales  Abdomen: soft non-tender non-distended, no hepatosplenomegaly  Ext: no clubbing cyanosis or edema  Skin: no rashes and no petechiae  Neuro: alert and oriented X 4, no focal deficits    MEDICATIONS  (STANDING):  aspirin  chewable 81 milliGRAM(s) Oral daily  dextrose 5%. 1000 milliLiter(s) (50 mL/Hr) IV Continuous <Continuous>  dextrose 50% Injectable 12.5 Gram(s) IV Push once  dextrose 50% Injectable 25 Gram(s) IV Push once  dextrose 50% Injectable 25 Gram(s) IV Push once  enoxaparin Injectable 40 milliGRAM(s) SubCutaneous daily  insulin lispro (HumaLOG) corrective regimen sliding scale   SubCutaneous three times a day before meals  sodium chloride 0.45% 1000 milliLiter(s) (100 mL/Hr) IV Continuous <Continuous>    MEDICATIONS  (PRN):  dextrose 40% Gel 15 Gram(s) Oral once PRN Blood Glucose LESS THAN 70 milliGRAM(s)/deciliter  diphenoxylate/atropine 1 Tablet(s) Oral four times a day PRN Diarrhea  glucagon  Injectable 1 milliGRAM(s) IntraMuscular once PRN Glucose LESS THAN 70 milligrams/deciliter      LABS:                          7.9    4.07  )-----------( 791      ( 28 May 2020 04:30 )             23.7         Mean Cell Volume : 101.7 fL  Mean Cell Hemoglobin : 33.9 pg  Mean Cell Hemoglobin Concentration : 33.3 g/dL  Auto Neutrophil # : 2.48 K/uL  Auto Lymphocyte # : 0.82 K/uL  Auto Monocyte # : 0.42 K/uL  Auto Eosinophil # : 0.24 K/uL  Auto Basophil # : 0.09 K/uL  Auto Neutrophil % : 61.0 %  Auto Lymphocyte % : 20.1 %  Auto Monocyte % : 10.3 %  Auto Eosinophil % : 5.9 %  Auto Basophil % : 2.2 %      Serial CBC's   @ 04:30  Hct-23.7 / Hgb-7.9 / Plat-791 / RBC-2.33 / WBC-4.07  Serial CBC's   @ 14:44  Hct-24.3 / Hgb-8.4 / Plat-832 / RBC-2.38 / WBC-5.14          133<L>  |  103  |  29<H>  ----------------------------<  316<H>  5.9<H>   |  16<L>  |  1.4    Ca    9.2      28 May 2020 11:01    TPro  6.4  /  Alb  4.2  /  TBili  1.1  /  DBili  x   /  AST  8   /  ALT  7   /  AlkPhos  79                  Hematopathology Report (20 @ 08:34)    Hematopathology Report:   ACCESSION No:  [id number]    SIMONA KUHN                       5        Addendum Report          Hematopathology Addendum  Test Performed: T Cell Gene Rearrangement ([facility], [id number]  [id number])  RESULTS:  TCR-beta:       NEGATIVE  TCR-gamma: NEGATIVE    Chromosome Analysis (ONC) Report ([facility], [id number])  Result: Normal female karyotype  Karyotype: 46,XX[20]    Verified by: Som Posey MD  (Electronic Signature)  Reported on: 20 16:36 EDT, [address] [place] [address]  Phone: [phone]   Fax: [phone]  _________________________________________________________________      Hematopathology Report          Final Diagnosis  1, 2 & 3. Bone marrow biopsy, clot & aspirate  - Hypercellular bone marrow (  hematopoiesis  - Erythroid hyperplasia with left shift and dyserythropoiesis  - No significantly increased myeloblasts  - Iron store significantly increased with ring siderblasts  (overall in between 5-15%)    Comment:  The patient has clinical history of Myelodysplastic syndromes  (MDS). The CBC data shows macrocytic anemia and the marrow  displays dyserythropoiesis with ring sideroblasts (5-15%). Next  Generation Sequencing study demonstrates one unclear variant in  SF3B1, whichclinical significance is currently unclear due to  the paucity of clinical and functional evidence regarding this  variant.  Clinical correlation is recommended.  Dr. ROSA Wade is  notified on .    Case reported to Tumor Registry.    PERIPHERALBLOOD:  (Performed on 2020)            SIMONA KUHN                       5        Hematopathology Report          Test Code Result Reference Range  WBC 4.65 K/uL L 4.80 - 10.80  RBC 1.91 M/uL L 4.20 - 5.40  HGB 6.8 g/dL LC 12.0 - 16.0  HCT 21.2 % L 37.0 - 47.0  .0 fL H 81.0 - 99.0  MCH 35.6 pg H 27.0 - 31.0  MCHC 32.1 g/dL 32.0 - 37.0  RDW 18.2 % H 11.5 - 14.5   K/uL H 130 - 400  NEUT% 54.8 % 42.2 - 75.2  NEUT# 2.55 K/uL 1.40 - 6.50  LYMPH% 24.1 % 20.5 - 51.1  LYMPH# 1.12 K/uL L 1.20 - 3.40  MONO% 12.3 % H 1.7 - 9.3  MONO# 0.57 K/uL 0.10 - 0.60  EOS% 5.8 % 0.0 - 8.0  EOS# 0.27 K/uL 0.00 - 0.70  BASO% 1.9 % H 0.0 - 1.0  BASO# 0.09 K/uL 0.00 - 0.20    BONE MARROW ASPIRATE SMEAR  Adequacy: Adequate  Differential: Number ofcells counted - 300 Cells.  Type            %        Normal*  Blast                1%       0-3  Neutrophil and  Precursors            40%       47-74  Eosinophil                3%       1-3  Basophil             0%       0-1  Pronormoblast  10%      0-2  Normoblast               43%      15-25  Monocyte              0%  0-1  Lymphocyte                 2%      5-15  Plasma cell                1%      0-2  M:E                      0.9:1  *Adult Range      Morphology:  Aspirate smears - Maturing trilineage hematopoiesis  Myeloid -  - Normal maturation  Erythroid -  -Erythroid hyperplasia with left shift and dysplastic  forms (irregular nuclear contour, rare bi- nucleated,  megaloblastoid change)  Megakaryocytes -  -Adequate with normal morphology  Plasma cells -  - Rare plasma cells seen with normal morphology  Lymphocytes -  - Scattered small lymphocytes            SIMONA KUHN        Hematopathology Report          Special stain -  - Iron stores increased with ring sideroblasts  (overall between 5-15%)    BONE MARROW/ BLOOD CLOT BIOPSY:  Quality: Adequate for evaluation  Cellularity: Hypercellular    M:E ratio: Decreased  Blasts: No significantly increased  Myeloid lineage: Present with full maturation  Erythroid lineage: Hyperplasia with left shift  Megakaryocytes: Adequate with normal morphology  Lymphocytes: Scattered  Plasma cells: Rare    IMMUNOPHENOTYPIN). FLOW CYTOMETRIC ANALYSIS: ([facility], [id number])  Bone marrow aspirate:  - The myeloid immunophenotypic findings show normal myeloid  granularity, no significant increase in myeloid immaturity, and  normal myeloid antigen maturation pattern.  - The lymphocyte immunophenotypic findings show decreased  CD4 to CD8 ration and a subset of T-cells with CD5 negativity.  Please see interpretation.    INTERPRETATION:  CYTOSPIN: Few maturing and mature myeloid elements with no  significant lymphocytosis or immaturity.    IMMUNOPHENOTYPE: CD45/side scatter shows normal myeloid  granularity. There is no significant increase in CD34 and   positive cells (1-2% of cells), and no increase in CD14 positive  cells. Myeloid antigen pattern is normal with CD13, CD16 and  CD11b.  Lymphocytes (19% of cells): Predominant population of T-cells  (with CD4 to CD8 ratio of 0.5 (decreased), including a few  natural killer-like T-cells), proportional increase in natural  killer cells, and rare B-cells. A subset of T-cells (10% of  lymphoid cells, 1-2% of total cells) shows absence of CD5.    The myeloid immunophenotypic findings show normal myeloid  granularity, no significant increase in myeloid immaturity, and  normal myeloid antigen maturation pattern.  The lymphocyte immunophenotypic findings show decreased CD4 to  CD8 ration and a subset of T-cells with CD5 negativity.    2) IMMUNOHISTOCHEMISTRY:  Performed on 1A & 2A: CD34, CD3, CD20,  CD61, AE1/AE3              SIMONA KUHN        Hematopathology Report          No increase in CD34 positive cells (<5%). CD61 stains scattered  megakaryocytes. CD3 highlights scattered and foci of small loss  aggregate T-cells; CD20 highlights fewer scattered B-cells. AE1/  AE3 is negative.    Fluorescence in situ Hybridization (FISH) Report ([facility], [id number])  FISH Result: NORMAL FISH MDS PANEL  Probe(s) and Location(s): T3H305/ D5S23 (5p15.2), EGR1 (5q31),  D7Z1 (7p11.1-q11.1), A6I232 (7q31), CEP-8/D8Z2 (8p11.1-q11.1),  D58A012 (20q12)    BIOREFERENCE on 2020 16:40  [facility] Myeloid Disorder Panel Final Report  RESULT SUMMARY: VARIANT  DETECTED GENOMIC ALTERATIONS:  One unclear variant in SF3B1  GENES ANALYZED:  ABL1, ASXL1, BCOR, BCORL1, BRAF, CALR, CBL, CDKN2A, CSF3R,  DNMT3A,  ETV6, EZH2, FBXW7, FLT3, GATA2, HRAS, IDH1, IDH2, JAK2, KIT,  KRAS,  MPL, MYD88, NPM1, NRAS, PHF6, PTEN, PTPN11, RUNX1, SETBP1,  SRSF2,  TET2, TP53, U2AF1, WT1, ZRSR2    CYTOGENETIC STUDIES: See separate report(s)    Verified by: Som Posey MD  (Electronic Signature)  Reported on: 20 17:36 EDT, [facility] [place] [address]  Phone: [phone]   Fax: [phone]  _________________________________________________________________    Clinical History  D46.9-Myelodysplastic syndrome, unspecified    Specimen(s) Submitted  1     Bone marrow biopsy -core  2     Bone marrow clot  3     Bone marrow aspirate slides -7    Gross Description  1. The specimen is received in formalin labeled "bone marrow  biopsy" and consists of a cylindrical fragment of brown tan hard  tissue, measuring 0.9 cm  in length and 0.2 cm in diameter. The  entire specimen is submitted after decalcification. (1 block)            SIMONA KUHN                       5        Hematopathology Report            2. The specimen is received in formalin labeled "bone marrow  clot" and consists of a blood clot measuring 0.6 x 0.2 x 0.2 cm  in aggregate. The entire specimen is submitted. (1 block)    3. Also received are seven (7)  bone marrow slides for review.    Specimen was received and underwent gross examination at [facility], [address] [place] [address].    20 10:34 ns                      BLOOD SMEAR INTERPRETATION:       RADIOLOGY & ADDITIONAL STUDIES:

## 2020-05-28 NOTE — CONSULT NOTE ADULT - SUBJECTIVE AND OBJECTIVE BOX
NEPHROLOGY CONSULTATION NOTE    74 y/o FM w/ PMhx of Low-Risk MDS currently on Revlimid, Vidaza therapy (x5 days every 28 days) s/p 1 cycle, T2DM, HTN, B12 deficiency on Monthy B12 shots was sent from [facility] for hyperkalemia. Pt had a hb of 6.6 (baseline 7) and was scheduled to receive x1 unit PRBC. After transfusion pt's blood work showed K of 6.7 and was advised to come to ED. Pt has no complaints at this time. Denies N/V/D, fever, chils, sob, dizziness, confusion, palpitations, spasms or weakness.    in the ED, vitals stable, Labs showed Hb of 8.4 and k of 6 and EKG no changes. Was given Ca Gluconate x1 w/ Insulin and D50.    PAST MEDICAL & SURGICAL HISTORY:  MDS (myelodysplastic syndrome)  DM (diabetes mellitus)  No significant past surgical history    Allergies:  No Known Allergies    Home Medications Reviewed    SOCIAL HISTORY:  Denies ETOH,Smoking, drugs    FAMILY HISTORY:  no family history of renal disease      REVIEW OF SYSTEMS:  CONSTITUTIONAL: No weakness, fevers or chills  EYES/ENT: No visual changes;  No vertigo or throat pain   NECK: No pain or stiffness  RESPIRATORY: No cough, wheezing, hemoptysis; No shortness of breath  CARDIOVASCULAR: No chest pain or palpitations.  GASTROINTESTINAL: No abdominal or epigastric pain. No nausea, vomiting, or hematemesis; No diarrhea or constipation. No melena or hematochezia.  GENITOURINARY: No dysuria, frequency, foamy urine, urinary urgency, incontinence or hematuria  NEUROLOGICAL: No numbness or weakness  SKIN: No itching, burning, rashes, or lesions   VASCULAR: No bilateral lower extremity edema.   All other review of systems is negative unless indicated above.    PHYSICAL EXAM:  Constitutional: NAD  HEENT: anicteric sclera, oropharynx clear, MMM  Neck: No JVD  Respiratory: CTAB, no wheezes, rales or rhonchi  Cardiovascular: S1, S2, RRR  Gastrointestinal: BS+, soft, NT/ND  Extremities: No cyanosis or clubbing. No peripheral edema  Neurological: A/O x 3, no focal deficits  Psychiatric: Normal mood, normal affect  : No CVA tenderness. No ocampo  Skin: No rashes    Hospital Medications:   MEDICATIONS  (STANDING):  aspirin  chewable 81 milliGRAM(s) Oral daily  azaCITIDine Injectable (eMAR) 123 milliGRAM(s) SubCutaneous daily  dextrose 5%. 1000 milliLiter(s) (50 mL/Hr) IV Continuous <Continuous>  dextrose 50% Injectable 12.5 Gram(s) IV Push once  dextrose 50% Injectable 25 Gram(s) IV Push once  dextrose 50% Injectable 25 Gram(s) IV Push once  enoxaparin Injectable 40 milliGRAM(s) SubCutaneous daily  insulin lispro (HumaLOG) corrective regimen sliding scale   SubCutaneous three times a day before meals  ondansetron    Tablet 8 milliGRAM(s) Oral daily  sodium chloride 0.45% 1000 milliLiter(s) (100 mL/Hr) IV Continuous <Continuous>        VITALS:  T(F): 98.8 (05-28-20 @ 16:00), Max: 98.8 (05-28-20 @ 16:00)  HR: 79 (05-28-20 @ 16:00)  BP: 124/58 (05-28-20 @ 16:00)  RR: 18 (05-28-20 @ 16:00)  SpO2: 100% (05-28-20 @ 16:00)  Wt(kg): --        LABS:  05-28    133<L>  |  103  |  29<H>  ----------------------------<  316<H>  5.9<H>   |  16<L>  |  1.4    Ca    9.2      28 May 2020 11:01    TPro  6.4  /  Alb  4.2  /  TBili  1.1  /  DBili      /  AST  8   /  ALT  7   /  AlkPhos  79  05-27                          7.9    4.07  )-----------( 791      ( 28 May 2020 04:30 )             23.7       Urine Studies:        RADIOLOGY & ADDITIONAL STUDIES:

## 2020-05-28 NOTE — DISCHARGE NOTE PROVIDER - CARE PROVIDER_API CALL
MIGUEL VAELNCIA  Hematology/Oncology  [address] [place] [address]  Phone: [phone]  Fax: [phone]  Established Patient  Follow Up Time: 1 week    ERNESTO SIEGEL  [id number] [place] [address]  Phone: [phone]  Fax: [phone]  Established Patient  Follow Up Time: 1 week MIGUEL VALENCIA  Hematology/Oncology  [address] [place] [address]  Phone: [phone]  Fax: [phone]  Established Patient  Follow Up Time: 1 week    ERNESTO SIEGEL  [id number] [place] [address]  Phone: ([phone]  Fax: ([phone]  Established Patient  Follow Up Time: 1 week    Paxton Bell  INTERNAL MEDICINE  [id number] [facility] [place] [address]  Phone: [phone]  Fax: [phone]  Follow Up Time: 1 week

## 2020-05-28 NOTE — DISCHARGE NOTE PROVIDER - NSDCCPCAREPLAN_GEN_ALL_CORE_FT
PRINCIPAL DISCHARGE DIAGNOSIS  Diagnosis: Hyperkalemia  Assessment and Plan of Treatment: Please take your medications as prescribed and follow up with your outpatient primary physician and hematologist. If you develop any sudden chest pain or pressure, shortness of breath, dizziness or lightheadedness, palpitations or rapid heartbeat, or any other symptoms or signs that alarm you, please seek immediate medical attention.      SECONDARY DISCHARGE DIAGNOSES  Diagnosis: MDS (myelodysplastic syndrome)  Assessment and Plan of Treatment: Please take your medications as prescribed and follow up with your outpatient primary physician and hematologist. PRINCIPAL DISCHARGE DIAGNOSIS  Diagnosis: Hyperkalemia  Assessment and Plan of Treatment: Please take your medications as prescribed and follow up with your outpatient primary physician, nephrologist and hematologist. If you develop any sudden chest pain or pressure, shortness of breath, dizziness or lightheadedness, palpitations or rapid heartbeat, or any other symptoms or signs that alarm you, please seek immediate medical attention.      SECONDARY DISCHARGE DIAGNOSES  Diagnosis: MDS (myelodysplastic syndrome)  Assessment and Plan of Treatment: Please take your medications as prescribed and follow up with your outpatient primary physician and hematologist.

## 2020-05-28 NOTE — DISCHARGE NOTE PROVIDER - NSDCFUSCHEDAPPT_GEN_ALL_CORE_FT
SIMONA KUHN ; 05/28/2020 ; [facility] SIMONA Berry ; 05/29/2020 ; [facility] SIMONA Berry ; 06/01/2020 ; [facility] Sebastian A  SIMONA KUHN ; 06/01/2020 ; [facility] HemOnc [facility] SIMONA Charlton ; 06/08/2020 ; [facility] SIMONA Berry ; 06/15/2020 ; [facility] Sebastian A  SIMONA KUHN ; 06/15/2020 ; [facility] HemOnc [facility] SIMONA Charlton ; 06/22/2020 ; [facility] SIMONA Charlton ; 06/22/2020 ; [facility] Chem[facility] SIMONA Berry ; 06/23/2020 ; [facility] SIMONA Berry ; 06/24/2020 ; [facility] SIMONA Berry ; 06/25/2020 ; [facility] Chem[facility] SIMONA RINCON ; 06/26/2020 ; [facility] Chemo [facility] Inf[facility] Sebastian A SIMONA KUHN ; 06/01/2020 ; [facility] Chemo [facility] Inf[facility] SIMONA Berry ; 06/01/2020 ; NPP HemOnc [facility] SIMONA Charlton ; 06/08/2020 ; [facility] Chem[facility] SIMONA Berry ; 06/15/2020 ; [facility] Chem[facility] Inf[facility] SIMONA Berry ; 06/15/2020 ; NPP HemOnc [facility] SIMONA Charlton ; 06/22/2020 ; [facility] HemOnc [facility] SIMONA Charlton ; 06/22/2020 ; [facility] Chemo [facility] Inf[facility] SIMONA Berry ; 06/23/2020 ; [facility] Chem[facility] Inf[facility] SIMONA Berry ; 06/24/2020 ; [facility] Chem[facility] Inf[facility] SIMONA Berry ; 06/25/2020 ; [facility] SIMONA Berry ; 06/26/2020 ; [facility] Chemo [facility] Inf[facility] Sebastian [facility]

## 2020-05-28 NOTE — CONSULT NOTE ADULT - ASSESSMENT
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  Pt denies taking ACE-I / ARB recently  CKD stage 3   - Cr stable in low mid 1's   - nl renal sono in 2020  NAGMA  mild hyponatremia  MDS on Vidaza  HTN  anemia    plan:    low K+ diet  pt to avoid high K+ food intake at home (pt was educated)  give 1/2NS with NaHCO3 75meq/L @ 100 cc/hr x 24 hours  lokelma 10g po x 1  no ACE-I / ARB  check renin and aldosterone levels  check UA, U protein / Cr ratio  optimize glycemic status  check urinary lytes including UK+  check PO4, PTH, serum betahydroxybutyrate and acetone, uric acid  d/w resident and hospitalist

## 2020-05-29 ENCOUNTER — TRANSCRIPTION ENCOUNTER (OUTPATIENT)
Age: 75
End: 2020-05-29

## 2020-05-29 ENCOUNTER — APPOINTMENT (OUTPATIENT)
Dept: INFUSION THERAPY | Facility: CLINIC | Age: 75
End: 2020-05-29

## 2020-05-29 VITALS
DIASTOLIC BLOOD PRESSURE: 67 MMHG | HEART RATE: 82 BPM | SYSTOLIC BLOOD PRESSURE: 150 MMHG | RESPIRATION RATE: 18 BRPM | TEMPERATURE: 98 F

## 2020-05-29 LAB
A1C WITH ESTIMATED AVERAGE GLUCOSE RESULT: 6.5 % — HIGH (ref 4–5.6)
ALBUMIN SERPL ELPH-MCNC: 4.1 G/DL — SIGNIFICANT CHANGE UP (ref 3.5–5.2)
ALP SERPL-CCNC: 73 U/L — SIGNIFICANT CHANGE UP (ref 30–115)
ALT FLD-CCNC: 7 U/L — SIGNIFICANT CHANGE UP (ref 0–41)
ANION GAP SERPL CALC-SCNC: 15 MMOL/L — HIGH (ref 7–14)
ANISOCYTOSIS BLD QL: SLIGHT — SIGNIFICANT CHANGE UP
APPEARANCE UR: CLEAR — SIGNIFICANT CHANGE UP
AST SERPL-CCNC: 8 U/L — SIGNIFICANT CHANGE UP (ref 0–41)
BASOPHILS # BLD AUTO: 0.12 K/UL — SIGNIFICANT CHANGE UP (ref 0–0.2)
BASOPHILS NFR BLD AUTO: 3.5 % — HIGH (ref 0–1)
BILIRUB SERPL-MCNC: 0.7 MG/DL — SIGNIFICANT CHANGE UP (ref 0.2–1.2)
BILIRUB UR-MCNC: NEGATIVE — SIGNIFICANT CHANGE UP
BLD GP AB SCN SERPL QL: SIGNIFICANT CHANGE UP
BUN SERPL-MCNC: 32 MG/DL — HIGH (ref 10–20)
BURR CELLS BLD QL SMEAR: PRESENT — SIGNIFICANT CHANGE UP
CALCIUM SERPL-MCNC: 9.3 MG/DL — SIGNIFICANT CHANGE UP (ref 8.5–10.1)
CALCIUM SERPL-MCNC: 9.3 MG/DL — SIGNIFICANT CHANGE UP (ref 8.5–10.1)
CHLORIDE SERPL-SCNC: 106 MMOL/L — SIGNIFICANT CHANGE UP (ref 98–110)
CO2 SERPL-SCNC: 18 MMOL/L — SIGNIFICANT CHANGE UP (ref 17–32)
COLOR SPEC: COLORLESS — SIGNIFICANT CHANGE UP
CREAT ?TM UR-MCNC: 38 MG/DL — SIGNIFICANT CHANGE UP
CREAT SERPL-MCNC: 1.5 MG/DL — SIGNIFICANT CHANGE UP (ref 0.7–1.5)
DACRYOCYTES BLD QL SMEAR: SLIGHT — SIGNIFICANT CHANGE UP
DIFF PNL FLD: NEGATIVE — SIGNIFICANT CHANGE UP
EOSINOPHIL # BLD AUTO: 0.4 K/UL — SIGNIFICANT CHANGE UP (ref 0–0.7)
EOSINOPHIL NFR BLD AUTO: 12.3 % — HIGH (ref 0–8)
ESTIMATED AVERAGE GLUCOSE: 140 MG/DL — HIGH (ref 68–114)
GIANT PLATELETS BLD QL SMEAR: PRESENT — SIGNIFICANT CHANGE UP
GLUCOSE BLDC GLUCOMTR-MCNC: 197 MG/DL — HIGH (ref 70–99)
GLUCOSE BLDC GLUCOMTR-MCNC: 256 MG/DL — HIGH (ref 70–99)
GLUCOSE SERPL-MCNC: 98 MG/DL — SIGNIFICANT CHANGE UP (ref 70–99)
GLUCOSE UR QL: NEGATIVE — SIGNIFICANT CHANGE UP
HCT VFR BLD CALC: 22.6 % — LOW (ref 37–47)
HGB BLD-MCNC: 7.2 G/DL — LOW (ref 12–16)
KETONES UR-MCNC: NEGATIVE — SIGNIFICANT CHANGE UP
LEUKOCYTE ESTERASE UR-ACNC: NEGATIVE — SIGNIFICANT CHANGE UP
LYMPHOCYTES # BLD AUTO: 1.24 K/UL — SIGNIFICANT CHANGE UP (ref 1.2–3.4)
LYMPHOCYTES # BLD AUTO: 37.7 % — SIGNIFICANT CHANGE UP (ref 20.5–51.1)
MAGNESIUM SERPL-MCNC: 1.8 MG/DL — SIGNIFICANT CHANGE UP (ref 1.8–2.4)
MANUAL SMEAR VERIFICATION: SIGNIFICANT CHANGE UP
MCHC RBC-ENTMCNC: 31.9 G/DL — LOW (ref 32–37)
MCHC RBC-ENTMCNC: 32.7 PG — HIGH (ref 27–31)
MCV RBC AUTO: 102.7 FL — HIGH (ref 81–99)
MICROCYTES BLD QL: SLIGHT — SIGNIFICANT CHANGE UP
MONOCYTES # BLD AUTO: 0.26 K/UL — SIGNIFICANT CHANGE UP (ref 0.1–0.6)
MONOCYTES NFR BLD AUTO: 7.9 % — SIGNIFICANT CHANGE UP (ref 1.7–9.3)
NEUTROPHILS # BLD AUTO: 1.27 K/UL — LOW (ref 1.4–6.5)
NEUTROPHILS NFR BLD AUTO: 36 % — LOW (ref 42.2–75.2)
NEUTS BAND # BLD: 2.6 % — SIGNIFICANT CHANGE UP (ref 0–6)
NITRITE UR-MCNC: NEGATIVE — SIGNIFICANT CHANGE UP
OSMOLALITY UR: 279 MOS/KG — SIGNIFICANT CHANGE UP (ref 50–1400)
OVALOCYTES BLD QL SMEAR: SLIGHT — SIGNIFICANT CHANGE UP
PH UR: 5.5 — SIGNIFICANT CHANGE UP (ref 5–8)
PHOSPHATE SERPL-MCNC: 4.3 MG/DL — SIGNIFICANT CHANGE UP (ref 2.1–4.9)
PLAT MORPH BLD: ABNORMAL
PLATELET # BLD AUTO: 762 K/UL — HIGH (ref 130–400)
POIKILOCYTOSIS BLD QL AUTO: SLIGHT — SIGNIFICANT CHANGE UP
POTASSIUM SERPL-MCNC: 4.8 MMOL/L — SIGNIFICANT CHANGE UP (ref 3.5–5)
POTASSIUM SERPL-SCNC: 4.8 MMOL/L — SIGNIFICANT CHANGE UP (ref 3.5–5)
POTASSIUM UR-SCNC: 3 MMOL/L — SIGNIFICANT CHANGE UP
PROT ?TM UR-MCNC: 5 MG/DLG/24H — SIGNIFICANT CHANGE UP
PROT SERPL-MCNC: 6.2 G/DL — SIGNIFICANT CHANGE UP (ref 6–8)
PROT UR-MCNC: NEGATIVE — SIGNIFICANT CHANGE UP
PROT/CREAT UR-RTO: 0.1 RATIO — SIGNIFICANT CHANGE UP (ref 0–0.2)
RBC # BLD: 2.2 M/UL — LOW (ref 4.2–5.4)
RBC # FLD: 21.3 % — HIGH (ref 11.5–14.5)
RBC BLD AUTO: ABNORMAL
SCHISTOCYTES BLD QL AUTO: SLIGHT — SIGNIFICANT CHANGE UP
SODIUM SERPL-SCNC: 139 MMOL/L — SIGNIFICANT CHANGE UP (ref 135–146)
SODIUM UR-SCNC: 20 MMOL/L — SIGNIFICANT CHANGE UP
SP GR SPEC: 1.01 — LOW (ref 1.01–1.02)
URATE SERPL-MCNC: 9.2 MG/DL — HIGH (ref 2.5–7)
UROBILINOGEN FLD QL: SIGNIFICANT CHANGE UP
WBC # BLD: 3.29 K/UL — LOW (ref 4.8–10.8)
WBC # FLD AUTO: 3.29 K/UL — LOW (ref 4.8–10.8)

## 2020-05-29 PROCEDURE — 99239 HOSP IP/OBS DSCHRG MGMT >30: CPT

## 2020-05-29 RX ORDER — SODIUM BICARBONATE 1 MEQ/ML
650 SYRINGE (ML) INTRAVENOUS
Refills: 0 | Status: DISCONTINUED | OUTPATIENT
Start: 2020-05-29 | End: 2020-05-29

## 2020-05-29 RX ORDER — SODIUM BICARBONATE 1 MEQ/ML
1 SYRINGE (ML) INTRAVENOUS
Qty: 0 | Refills: 0 | DISCHARGE
Start: 2020-05-29

## 2020-05-29 RX ORDER — SODIUM BICARBONATE 1 MEQ/ML
1 SYRINGE (ML) INTRAVENOUS
Qty: 60 | Refills: 0
Start: 2020-05-29 | End: 2020-06-27

## 2020-05-29 RX ADMIN — Medication 81 MILLIGRAM(S): at 12:07

## 2020-05-29 RX ADMIN — AZACITIDINE FOR 123 MILLIGRAM(S): 100 INJECTION, POWDER, LYOPHILIZED, FOR SOLUTION INTRAVENOUS; SUBCUTANEOUS at 13:01

## 2020-05-29 RX ADMIN — Medication 3: at 12:03

## 2020-05-29 RX ADMIN — ENOXAPARIN SODIUM 40 MILLIGRAM(S): 100 INJECTION SUBCUTANEOUS at 12:04

## 2020-05-29 RX ADMIN — ONDANSETRON 8 MILLIGRAM(S): 8 TABLET, FILM COATED ORAL at 13:02

## 2020-05-29 NOTE — PROGRESS NOTE ADULT - SUBJECTIVE AND OBJECTIVE BOX
Patient is a 75y old  Female who presents with a chief complaint of Hyperkalemia (29 May 2020 09:44)      Subjective: Pt seen and examined , lying on bed . Reports feeling fine , was given Vidaza yesterday . Also received lokelma x1 . Afebrile.      Vital Signs Last 24 Hrs  T(C): 37.1 (29 May 2020 05:20), Max: 37.1 (28 May 2020 16:00)  T(F): 98.8 (29 May 2020 05:20), Max: 98.8 (28 May 2020 16:00)  HR: 87 (29 May 2020 05:20) (79 - 87)  BP: 125/49 (29 May 2020 05:20) (118/56 - 125/49)  BP(mean): --  RR: 18 (29 May 2020 09:22) (18 - 18)  SpO2: 100% (29 May 2020 09:22) (100% - 100%)    PHYSICAL EXAM  General: adult in NAD  HEENT: pale conjuctiva   CV: normal S1/S2   Lungs: positive air movement b/l ant lungs,clear to auscultation, no wheezes, no rales  Abdomen: soft non-tender non-distended, no hepatosplenomegaly  Ext: no clubbing cyanosis or edema  Skin: no rashes and no petechiae  Neuro: alert and oriented X 4, no focal deficits    MEDICATIONS  (STANDING):  aspirin  chewable 81 milliGRAM(s) Oral daily  azaCITIDine Injectable (eMAR) 123 milliGRAM(s) SubCutaneous daily  dextrose 5%. 1000 milliLiter(s) (50 mL/Hr) IV Continuous <Continuous>  dextrose 50% Injectable 12.5 Gram(s) IV Push once  dextrose 50% Injectable 25 Gram(s) IV Push once  dextrose 50% Injectable 25 Gram(s) IV Push once  enoxaparin Injectable 40 milliGRAM(s) SubCutaneous daily  insulin lispro (HumaLOG) corrective regimen sliding scale   SubCutaneous three times a day before meals  ondansetron    Tablet 8 milliGRAM(s) Oral daily  sodium bicarbonate 650 milliGRAM(s) Oral two times a day    MEDICATIONS  (PRN):  dextrose 40% Gel 15 Gram(s) Oral once PRN Blood Glucose LESS THAN 70 milliGRAM(s)/deciliter  glucagon  Injectable 1 milliGRAM(s) IntraMuscular once PRN Glucose LESS THAN 70 milligrams/deciliter      LABS:                          7.2    3.29  )-----------( 762      ( 29 May 2020 04:30 )             22.6         Mean Cell Volume : 102.7 fL  Mean Cell Hemoglobin : 32.7 pg  Mean Cell Hemoglobin Concentration : 31.9 g/dL  Auto Neutrophil # : 1.27 K/uL  Auto Lymphocyte # : 1.24 K/uL  Auto Monocyte # : 0.26 K/uL  Auto Eosinophil # : 0.40 K/uL  Auto Basophil # : 0.12 K/uL  Auto Neutrophil % : 36.0 %  Auto Lymphocyte % : 37.7 %  Auto Monocyte % : 7.9 %  Auto Eosinophil % : 12.3 %  Auto Basophil % : 3.5 %      Serial CBC's   @ 04:30  Hct-22.6 / Hgb-7.2 / Plat-762 / RBC-2.20 / WBC-3.29  Serial CBC's   @ 04:30  Hct-23.7 / Hgb-7.9 / Plat-791 / RBC-2.33 / WBC-4.07  Serial CBC's   @ 14:44  Hct-24.3 / Hgb-8.4 / Plat-832 / RBC-2.38 / WBC-5.14          139  |  106  |  32<H>  ----------------------------<  98  4.8   |  18  |  1.5    Ca    9.3      29 May 2020 04:30  Phos  4.3       Mg     1.8         TPro  6.2  /  Alb  4.1  /  TBili  0.7  /  DBili  x   /  AST  8   /  ALT  7   /  AlkPhos  73                        Urinalysis Basic - ( 29 May 2020 08:10 )    Color: Colorless / Appearance: Clear / S.007 / pH: x  Gluc: x / Ketone: Negative  / Bili: Negative / Urobili: <2 mg/dL   Blood: x / Protein: Negative / Nitrite: Negative   Leuk Esterase: Negative / RBC: x / WBC x   Sq Epi: x / Non Sq Epi: x / Bacteria: x                BLOOD SMEAR INTERPRETATION:       RADIOLOGY & ADDITIONAL STUDIES:

## 2020-05-29 NOTE — PROGRESS NOTE ADULT - ASSESSMENT
74 y/o FM w/ PMhx of Low-Risk MDS currently on Revlimid, Vidaza therapy (x5 days every 28 days) s/p 1 cycle, T2DM, HTN, B12 deficiency on Monthy B12 shots was sent from [facility] for hyperkalemia     #Hyperkalemia resolved     #metabolic acidosis: non AG   will be going home on po bicarb as per renal     #Chronic Macrocyctic anemia  MDS and B12 deficiency  - Hb baseline 7  - this admission 8.4 s/p 1 unit PRBC at [facility] today 7.2 will transfuse one unit of PRBC   - no active bleed    #t2DM  - resume home meds on discharge spoke to renal DR Bell about metformin and renal is ok with metformin on discharge     #HTN  controlled     discharge today after patient gets one unit of PRBC     spent 35 min on discharge

## 2020-05-29 NOTE — DISCHARGE NOTE NURSING/CASE MANAGEMENT/SOCIAL WORK - PATIENT PORTAL LINK FT
You can access the FollowMyHealth Patient Portal offered by [facility] by registering at the following website: [email or web address]. By joining [employer]’s FollowMyHealth portal, you will also be able to view your health information using other applications (apps) compatible with our system.

## 2020-05-29 NOTE — CHART NOTE - NSCHARTNOTEFT_GEN_A_CORE
<<<RESIDENT DISCHARGE NOTE>>>     SIMONA KUHN  MRN-[id number]    Patient seen and examined - no acute events overnight. She feels well this morning, with no complaints. She will receive 1u PRBC and Vidaza today, and then can go home.     VITAL SIGNS:  T(F): 98.8 (05-29-20 @ 05:20), Max: 98.8 (05-28-20 @ 16:00)  HR: 87 (05-29-20 @ 05:20)  BP: 125/49 (05-29-20 @ 05:20)  SpO2: 100% (05-29-20 @ 09:22)  Weight (kg): 64.9 (05-28-20 @ 20:19)  BMI (kg/m2): 26.2 (05-28-20 @ 20:19)    T(C): 37.1 (05-29-20 @ 05:20), Max: 37.1 (05-28-20 @ 16:00)  HR: 87 (05-29-20 @ 05:20) (79 - 87)  BP: 125/49 (05-29-20 @ 05:20) (118/56 - 125/49)  RR: 18 (05-29-20 @ 09:22) (18 - 18)  SpO2: 100% (05-29-20 @ 09:22) (100% - 100%)    GENERAL: NAD, well-developed, 75y  EENT: EOMI, conjunctiva and sclera clear, No nasal obstruction or discharge  RESPIRATORY: Clear to auscultation bilaterally; No wheeze or crackles  CARDIOVASCULAR: Regular rate and rhythm; No murmurs, rubs, or gallops, no pitting edema  GASTROINTESTINAL: Abdomen Soft, Nontender, Nondistended  MUSCULOSKELETAL:  No cyanosis, extremities grossly symmetrical  PSYCH: AAOx3, affect appropriate  NEUROLOGY: non-focal, cognition grossly intact, MAEE    TEST RESULTS:                        7.2    3.29  )-----------( 762      ( 29 May 2020 04:30 )             22.6       05-29    139  |  106  |  32<H>  ----------------------------<  98  4.8   |  18  |  1.5    Ca    9.3      29 May 2020 04:30  Phos  4.3     05-29  Mg     1.8     05-29    TPro  6.2  /  Alb  4.1  /  TBili  0.7  /  DBili  x   /  AST  8   /  ALT  7   /  AlkPhos  73  05-29      FINAL DISCHARGE INTERVIEW:  Resident(s) Present: (Name:____Jr_________), RN Present: (Name:  ___________)    DISCHARGE MEDICATION RECONCILIATION  reviewed with Attending (Name:___Shaina________)    DISPOSITION:   [ x ] Home,    [  ] Home with Visiting Nursing Services,   [    ]  SNF/ NH,    [   ] Acute Rehab (4A),   [   ] Other (Specify:_________)

## 2020-05-29 NOTE — PROGRESS NOTE ADULT - SUBJECTIVE AND OBJECTIVE BOX
no chest pain   no sob   doing well   Vital Signs Last 24 Hrs  T(C): 37.1 (29 May 2020 05:20), Max: 37.1 (28 May 2020 16:00)  T(F): 98.8 (29 May 2020 05:20), Max: 98.8 (28 May 2020 16:00)  HR: 87 (29 May 2020 05:20) (79 - 87)  BP: 125/49 (29 May 2020 05:20) (118/56 - 125/49)  BP(mean): --  RR: 18 (29 May 2020 09:22) (18 - 18)  SpO2: 100% (29 May 2020 09:22) (100% - 100%)    PHYSICAL EXAM:  GENERAL: NAD, well-developed  HEAD:  Atraumatic, Normocephalic  EYES: EOMI, PERRLA, conjunctiva and sclera clear  NECK: Supple, No JVD  CHEST/LUNG: Clear to auscultation bilaterally; No wheeze  HEART: Regular rate and rhythm; No murmurs, rubs, or gallops  ABDOMEN: Soft, Nontender, Nondistended; Bowel sounds present  EXTREMITIES:  2+ Peripheral Pulses, No clubbing, cyanosis, or edema  PSYCH: AAOx3  NEUROLOGY: non-focal  SKIN: No rashes or lesions                          7.2    3.29  )-----------( 762      ( 29 May 2020 04:30 )             22.6     05-29    139  |  106  |  32<H>  ----------------------------<  98  4.8   |  18  |  1.5    Ca    9.3      29 May 2020 04:30  Phos  4.3     05-29  Mg     1.8     05-29    TPro  6.2  /  Alb  4.1  /  TBili  0.7  /  DBili  x   /  AST  8   /  ALT  7   /  AlkPhos  73  05-29    LIVER FUNCTIONS - ( 29 May 2020 04:30 )  Alb: 4.1 g/dL / Pro: 6.2 g/dL / ALK PHOS: 73 U/L / ALT: 7 U/L / AST: 8 U/L / GGT: x                 MEDICATIONS  (STANDING):  aspirin  chewable 81 milliGRAM(s) Oral daily  azaCITIDine Injectable (eMAR) 123 milliGRAM(s) SubCutaneous daily  dextrose 5%. 1000 milliLiter(s) (50 mL/Hr) IV Continuous <Continuous>  dextrose 50% Injectable 12.5 Gram(s) IV Push once  dextrose 50% Injectable 25 Gram(s) IV Push once  dextrose 50% Injectable 25 Gram(s) IV Push once  enoxaparin Injectable 40 milliGRAM(s) SubCutaneous daily  insulin lispro (HumaLOG) corrective regimen sliding scale   SubCutaneous three times a day before meals  ondansetron    Tablet 8 milliGRAM(s) Oral daily  sodium bicarbonate 650 milliGRAM(s) Oral two times a day    MEDICATIONS  (PRN):  dextrose 40% Gel 15 Gram(s) Oral once PRN Blood Glucose LESS THAN 70 milliGRAM(s)/deciliter  glucagon  Injectable 1 milliGRAM(s) IntraMuscular once PRN Glucose LESS THAN 70 milligrams/deciliter

## 2020-05-29 NOTE — PROGRESS NOTE ADULT - SUBJECTIVE AND OBJECTIVE BOX
NEPHROLOGY FOLLOW UP NOTE    K+ improved  on bicarb gtt  cr stable  + void  BP and BS acceptable  no overnight events    PAST MEDICAL & SURGICAL HISTORY:  MDS (myelodysplastic syndrome)  DM (diabetes mellitus)  No significant past surgical history    Allergies:  No Known Allergies    Home Medications Reviewed    SOCIAL HISTORY:  Denies ETOH,Smoking, drugs    FAMILY HISTORY:  no family history of renal disease      REVIEW OF SYSTEMS:  CONSTITUTIONAL: No weakness, fevers or chills  EYES/ENT: No visual changes;  No vertigo or throat pain   NECK: No pain or stiffness  RESPIRATORY: No cough, wheezing, hemoptysis; No shortness of breath  CARDIOVASCULAR: No chest pain or palpitations.  GASTROINTESTINAL: No abdominal or epigastric pain. No nausea, vomiting, or hematemesis; No diarrhea or constipation. No melena or hematochezia.  GENITOURINARY: No dysuria, frequency, foamy urine, urinary urgency, incontinence or hematuria  NEUROLOGICAL: No numbness or weakness  SKIN: No itching, burning, rashes, or lesions   VASCULAR: No bilateral lower extremity edema.   All other review of systems is negative unless indicated above.    advance care planning and advance care directives reviewed     PHYSICAL EXAM:  Constitutional: NAD  HEENT: anicteric sclera, oropharynx clear, MMM  Neck: No JVD  Respiratory: CTAB, no wheezes, rales or rhonchi  Cardiovascular: S1, S2, RRR  Gastrointestinal: BS+, soft, NT/ND  Extremities: No cyanosis or clubbing. No peripheral edema  Neurological: A/O x 3, no focal deficits  Psychiatric: Normal mood, normal affect  : No CVA tenderness. No ocampo  Skin: No rashes    Hospital Medications:   MEDICATIONS  (STANDING):  aspirin  chewable 81 milliGRAM(s) Oral daily  azaCITIDine Injectable (eMAR) 123 milliGRAM(s) SubCutaneous daily  dextrose 5%. 1000 milliLiter(s) (50 mL/Hr) IV Continuous <Continuous>  dextrose 50% Injectable 12.5 Gram(s) IV Push once  dextrose 50% Injectable 25 Gram(s) IV Push once  dextrose 50% Injectable 25 Gram(s) IV Push once  enoxaparin Injectable 40 milliGRAM(s) SubCutaneous daily  insulin lispro (HumaLOG) corrective regimen sliding scale   SubCutaneous three times a day before meals  ondansetron    Tablet 8 milliGRAM(s) Oral daily  sodium chloride 0.45% 1000 milliLiter(s) (100 mL/Hr) IV Continuous <Continuous>        VITALS:  T(F): 98.8 (20 @ 05:20), Max: 98.8 (20 @ 16:00)  HR: 87 (20 @ 05:20)  BP: 125/49 (20 @ 05:20)  RR: 18 (20 @ 09:22)  SpO2: 100% (20 @ 09:22)  Wt(kg): --    Height (cm): 157.5 ( 20:19)  Weight (kg): 64.9 ( 20:19)  BMI (kg/m2): 26.2 ( 20:19)  BSA (m2): 1.66 ( 20:19)    LABS:      139  |  106  |  32<H>  ----------------------------<  98  4.8   |  18  |  1.5    Ca    9.3      29 May 2020 04:30  Phos  4.3       Mg     1.8         TPro  6.2  /  Alb  4.1  /  TBili  0.7  /  DBili      /  AST  8   /  ALT  7   /  AlkPhos  73                            7.2    3.29  )-----------( 762      ( 29 May 2020 04:30 )             22.6       Urine Studies:  Urinalysis Basic - ( 29 May 2020 08:10 )    Color: Colorless / Appearance: Clear / S.007 / pH:   Gluc:  / Ketone: Negative  / Bili: Negative / Urobili: <2 mg/dL   Blood:  / Protein: Negative / Nitrite: Negative   Leuk Esterase: Negative / RBC:  / WBC    Sq Epi:  / Non Sq Epi:  / Bacteria:       Sodium, Random Urine: 20.0 mmoL/L ( @ 08:10)  Potassium, Random Urine: 3 mmol/L ( @ 08:10)      RADIOLOGY & ADDITIONAL STUDIES:

## 2020-05-29 NOTE — PROGRESS NOTE ADULT - ASSESSMENT
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  Pt denies taking ACE-I / ARB recently  CKD stage 3   - Cr stable in low mid 1's   - nl renal sono in 2020  NAGMA  MDS on Vidaza  HTN  anemia    plan:    low K+ diet  pt to avoid high K+ food intake at home (pt was educated - limit veggie juicing)  dc bicarb IVF  start sodium bicarb 650mg po bid  no ACE-I / ARB  may cont metformin and glyburide  f/u renin and aldosterone levels, UA, U protein / Cr ratio - can be followed as outpt  stable for dc home from renal standpoint  f/u bmp in 1-2 weeks  ckd education  full code modest hyperkalemia without significant EKG abnormalities   - etiology multifactorial: CKD, metabolic acidosis, 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  Pt denies taking ACE-I / ARB recently  CKD stage 3   - Cr stable in low mid 1's   - nl renal sono in 2020  NAGMA  MDS on Vidaza  HTN  anemia    plan:    low K+ diet  pt to avoid high K+ food intake at home (pt was educated - limit veggie juicing)  dc bicarb IVF  start sodium bicarb 650mg po bid  no ACE-I / ARB  may cont metformin and glyburide  f/u renin and aldosterone levels, UA, U protein / Cr ratio - can be followed as outpt  stable for dc home from renal standpoint, though f/u hem for lower h/h  f/u bmp in 1-2 weeks  ckd education  full code

## 2020-05-29 NOTE — PROGRESS NOTE ADULT - ASSESSMENT
74 y/o FM w/ PMhx of Low-Risk MDS currently on Vidaza therapy (x5 days every 28 days) s/p 1 cycle, T2DM, HTN, B12 deficiency on Monthy B12 shots was sent from [facility] for hyperkalemia.     Pt has h/o Low risk MDS and is getting Vidaza 5 days every 28 days . She started her second cycle on 5/26/20 , received 2 doses and now is admitted with hyperkalemia .     # Hyperkalemia/ h/o CKD III / Creatinine around baseline / Blood sugars were well controlled on admission    -- etiology unclear normal kidney function , did not start taking any new medications ,Could be pesudohyperkalemia secondary to Thrombocytosis : check plasma k  -- agree with low K diet .  -- no concern for Tumor lysis.  --s/p 1 dose of lokelma , k is wnl today .  -- Discussed with nephr , pt has D.M and possibly Type IV RTA with high K diet and thrombocytosis , etiology is multifactorial .  -- She is educated about diet and diabetic control .  --also recommended to start Po Bicarbonate .    # Low Risk MDS s/p treatment with Revlimed / More recently started Vidaza s/p 1st cycle:  --since pt already started second cycle and is admitted in hospital , we will c/w vidaza in house .  --Scheduled for D4 today , will proceed with it , consent obtained and placed in chart .  -- Doubt persistent hyperkalemia is sec to Vidaza , as review of literature is not suggestive of vidaza's association with hyperkalemia .  -- Will proceed with D5 on Monday .   --Pt is scheduled to get D5 on 6/1/20 as out pt .     # Normocytic anemia : From MDS :  - s/p 1 unit PRBC on 5/27/20 . Transfuse 1 more unit today   - Also gets procrit wkly at [facility] .     attending , can be d/c home today after transfusion and Vidaza  .

## 2020-06-01 ENCOUNTER — LABORATORY RESULT (OUTPATIENT)
Age: 75
End: 2020-06-01

## 2020-06-01 ENCOUNTER — APPOINTMENT (OUTPATIENT)
Dept: HEMATOLOGY ONCOLOGY | Facility: CLINIC | Age: 75
End: 2020-06-01

## 2020-06-01 ENCOUNTER — APPOINTMENT (OUTPATIENT)
Dept: INFUSION THERAPY | Facility: CLINIC | Age: 75
End: 2020-06-01

## 2020-06-01 LAB — ALDOST SERPL-MCNC: 5.6 NG/DL — SIGNIFICANT CHANGE UP

## 2020-06-01 RX ORDER — AZACITIDINE FOR 100 MG/1
123 INJECTION, POWDER, LYOPHILIZED, FOR SOLUTION INTRAVENOUS; SUBCUTANEOUS ONCE
Refills: 0 | Status: COMPLETED | OUTPATIENT
Start: 2020-06-01 | End: 2020-06-01

## 2020-06-01 RX ORDER — ONDANSETRON 8 MG/1
8 TABLET, FILM COATED ORAL ONCE
Refills: 0 | Status: COMPLETED | OUTPATIENT
Start: 2020-06-01 | End: 2020-06-01

## 2020-06-01 RX ORDER — ERYTHROPOIETIN 10000 [IU]/ML
80000 INJECTION, SOLUTION INTRAVENOUS; SUBCUTANEOUS ONCE
Refills: 0 | Status: COMPLETED | OUTPATIENT
Start: 2020-06-01 | End: 2020-06-01

## 2020-06-01 RX ADMIN — AZACITIDINE FOR 123 MILLIGRAM(S): 100 INJECTION, POWDER, LYOPHILIZED, FOR SOLUTION INTRAVENOUS; SUBCUTANEOUS at 17:17

## 2020-06-01 RX ADMIN — ONDANSETRON 8 MILLIGRAM(S): 8 TABLET, FILM COATED ORAL at 16:47

## 2020-06-01 RX ADMIN — ERYTHROPOIETIN 80000 UNIT(S): 10000 INJECTION, SOLUTION INTRAVENOUS; SUBCUTANEOUS at 16:47

## 2020-06-02 DIAGNOSIS — E11.22 TYPE 2 DIABETES MELLITUS WITH DIABETIC CHRONIC KIDNEY DISEASE: ICD-10-CM

## 2020-06-02 DIAGNOSIS — D46.Z OTHER MYELODYSPLASTIC SYNDROMES: ICD-10-CM

## 2020-06-02 DIAGNOSIS — E87.5 HYPERKALEMIA: ICD-10-CM

## 2020-06-02 DIAGNOSIS — N25.89 OTHER DISORDERS RESULTING FROM IMPAIRED RENAL TUBULAR FUNCTION: ICD-10-CM

## 2020-06-02 DIAGNOSIS — E87.2 ACIDOSIS: ICD-10-CM

## 2020-06-02 DIAGNOSIS — Z79.899 OTHER LONG TERM (CURRENT) DRUG THERAPY: ICD-10-CM

## 2020-06-02 DIAGNOSIS — I12.9 HYPERTENSIVE CHRONIC KIDNEY DISEASE WITH STAGE 1 THROUGH STAGE 4 CHRONIC KIDNEY DISEASE, OR UNSPECIFIED CHRONIC KIDNEY DISEASE: ICD-10-CM

## 2020-06-02 DIAGNOSIS — Z71.3 DIETARY COUNSELING AND SURVEILLANCE: ICD-10-CM

## 2020-06-02 DIAGNOSIS — E11.69 TYPE 2 DIABETES MELLITUS WITH OTHER SPECIFIED COMPLICATION: ICD-10-CM

## 2020-06-02 DIAGNOSIS — Z79.84 LONG TERM (CURRENT) USE OF ORAL HYPOGLYCEMIC DRUGS: ICD-10-CM

## 2020-06-02 DIAGNOSIS — D47.3 ESSENTIAL (HEMORRHAGIC) THROMBOCYTHEMIA: ICD-10-CM

## 2020-06-02 DIAGNOSIS — E87.1 HYPO-OSMOLALITY AND HYPONATREMIA: ICD-10-CM

## 2020-06-02 DIAGNOSIS — Z79.82 LONG TERM (CURRENT) USE OF ASPIRIN: ICD-10-CM

## 2020-06-02 DIAGNOSIS — D53.9 NUTRITIONAL ANEMIA, UNSPECIFIED: ICD-10-CM

## 2020-06-02 DIAGNOSIS — E27.40 UNSPECIFIED ADRENOCORTICAL INSUFFICIENCY: ICD-10-CM

## 2020-06-02 DIAGNOSIS — N18.3 CHRONIC KIDNEY DISEASE, STAGE 3 (MODERATE): ICD-10-CM

## 2020-06-02 DIAGNOSIS — E53.8 DEFICIENCY OF OTHER SPECIFIED B GROUP VITAMINS: ICD-10-CM

## 2020-06-02 DIAGNOSIS — E11.65 TYPE 2 DIABETES MELLITUS WITH HYPERGLYCEMIA: ICD-10-CM

## 2020-06-02 LAB
HCT VFR BLD CALC: 27.8 %
HGB BLD-MCNC: 9.1 G/DL
MCHC RBC-ENTMCNC: 32.7 G/DL
MCHC RBC-ENTMCNC: 33.5 PG
MCV RBC AUTO: 102.2 FL
PLATELET # BLD AUTO: 703 K/UL
PMV BLD: 9.6 FL
POTASSIUM SERPL-SCNC: 4.9 MMOL/L
RBC # BLD: 2.72 M/UL
RBC # FLD: 18.5 %
WBC # FLD AUTO: 4.22 K/UL

## 2020-06-03 DIAGNOSIS — D72.820 LYMPHOCYTOSIS (SYMPTOMATIC): ICD-10-CM

## 2020-06-08 ENCOUNTER — LABORATORY RESULT (OUTPATIENT)
Age: 75
End: 2020-06-08

## 2020-06-08 ENCOUNTER — APPOINTMENT (OUTPATIENT)
Dept: INFUSION THERAPY | Facility: CLINIC | Age: 75
End: 2020-06-08

## 2020-06-08 LAB
HCT VFR BLD CALC: 25.8 %
HGB BLD-MCNC: 8.6 G/DL
MCHC RBC-ENTMCNC: 33.1 PG
MCHC RBC-ENTMCNC: 33.3 G/DL
MCV RBC AUTO: 99.2 FL
PLATELET # BLD AUTO: 450 K/UL
PMV BLD: 10 FL
RBC # BLD: 2.6 M/UL
RBC # FLD: 17.2 %
WBC # FLD AUTO: 4.36 K/UL

## 2020-06-08 RX ORDER — ERYTHROPOIETIN 10000 [IU]/ML
80000 INJECTION, SOLUTION INTRAVENOUS; SUBCUTANEOUS ONCE
Refills: 0 | Status: COMPLETED | OUTPATIENT
Start: 2020-06-08 | End: 2020-06-08

## 2020-06-08 RX ADMIN — ERYTHROPOIETIN 80000 UNIT(S): 10000 INJECTION, SOLUTION INTRAVENOUS; SUBCUTANEOUS at 13:02

## 2020-06-09 LAB — RENIN PLAS-CCNC: 1.72 NG/ML/HR — SIGNIFICANT CHANGE UP (ref 0.17–5.38)

## 2020-06-15 ENCOUNTER — APPOINTMENT (OUTPATIENT)
Dept: HEMATOLOGY ONCOLOGY | Facility: CLINIC | Age: 75
End: 2020-06-15

## 2020-06-15 ENCOUNTER — APPOINTMENT (OUTPATIENT)
Dept: INFUSION THERAPY | Facility: CLINIC | Age: 75
End: 2020-06-15

## 2020-06-15 ENCOUNTER — OUTPATIENT (OUTPATIENT)
Dept: OUTPATIENT SERVICES | Facility: HOSPITAL | Age: 75
LOS: 1 days | Discharge: HOME | End: 2020-06-15

## 2020-06-15 ENCOUNTER — LABORATORY RESULT (OUTPATIENT)
Age: 75
End: 2020-06-15

## 2020-06-15 DIAGNOSIS — Z51.11 ENCOUNTER FOR ANTINEOPLASTIC CHEMOTHERAPY: ICD-10-CM

## 2020-06-15 DIAGNOSIS — D64.9 ANEMIA, UNSPECIFIED: ICD-10-CM

## 2020-06-15 DIAGNOSIS — D46.9 MYELODYSPLASTIC SYNDROME, UNSPECIFIED: ICD-10-CM

## 2020-06-15 LAB
HCT VFR BLD CALC: 24.6 %
HGB BLD-MCNC: 8.4 G/DL
MCHC RBC-ENTMCNC: 33.9 PG
MCHC RBC-ENTMCNC: 34.1 G/DL
MCV RBC AUTO: 99.2 FL
PLATELET # BLD AUTO: 485 K/UL
PMV BLD: 10 FL
RBC # BLD: 2.48 M/UL
RBC # FLD: 18 %
WBC # FLD AUTO: 5.45 K/UL

## 2020-06-15 RX ORDER — ERYTHROPOIETIN 10000 [IU]/ML
80000 INJECTION, SOLUTION INTRAVENOUS; SUBCUTANEOUS ONCE
Refills: 0 | Status: COMPLETED | OUTPATIENT
Start: 2020-06-15 | End: 2020-06-15

## 2020-06-15 RX ORDER — PREGABALIN 225 MG/1
1000 CAPSULE ORAL ONCE
Refills: 0 | Status: COMPLETED | OUTPATIENT
Start: 2020-06-15 | End: 2020-06-15

## 2020-06-15 RX ADMIN — ERYTHROPOIETIN 80000 UNIT(S): 10000 INJECTION, SOLUTION INTRAVENOUS; SUBCUTANEOUS at 15:45

## 2020-06-15 RX ADMIN — PREGABALIN 1000 MICROGRAM(S): 225 CAPSULE ORAL at 15:45

## 2020-06-17 DIAGNOSIS — Z00.8 ENCOUNTER FOR OTHER GENERAL EXAMINATION: ICD-10-CM

## 2020-06-22 ENCOUNTER — APPOINTMENT (OUTPATIENT)
Dept: HEMATOLOGY ONCOLOGY | Facility: CLINIC | Age: 75
End: 2020-06-22
Payer: MEDICARE

## 2020-06-22 ENCOUNTER — APPOINTMENT (OUTPATIENT)
Dept: INFUSION THERAPY | Facility: CLINIC | Age: 75
End: 2020-06-22
Payer: MEDICARE

## 2020-06-22 ENCOUNTER — LABORATORY RESULT (OUTPATIENT)
Age: 75
End: 2020-06-22

## 2020-06-22 VITALS
SYSTOLIC BLOOD PRESSURE: 116 MMHG | HEART RATE: 97 BPM | WEIGHT: 136 LBS | BODY MASS INDEX: 25.03 KG/M2 | DIASTOLIC BLOOD PRESSURE: 50 MMHG | HEIGHT: 62 IN | TEMPERATURE: 98.8 F

## 2020-06-22 LAB
HCT VFR BLD CALC: 23.9 %
HGB BLD-MCNC: 8 G/DL
MCHC RBC-ENTMCNC: 33.5 G/DL
MCHC RBC-ENTMCNC: 33.9 PG
MCV RBC AUTO: 101.3 FL
PLATELET # BLD AUTO: 990 K/UL
PMV BLD: 10 FL
RBC # BLD: 2.36 M/UL
RBC # FLD: 18.4 %
WBC # FLD AUTO: 6.08 K/UL

## 2020-06-22 PROCEDURE — 99215 OFFICE O/P EST HI 40 MIN: CPT

## 2020-06-22 RX ORDER — ONDANSETRON 8 MG/1
8 TABLET, FILM COATED ORAL ONCE
Refills: 0 | Status: COMPLETED | OUTPATIENT
Start: 2020-06-22 | End: 2020-06-22

## 2020-06-22 RX ORDER — AZACITIDINE FOR 100 MG/1
123 INJECTION, POWDER, LYOPHILIZED, FOR SOLUTION INTRAVENOUS; SUBCUTANEOUS ONCE
Refills: 0 | Status: COMPLETED | OUTPATIENT
Start: 2020-06-22 | End: 2020-06-22

## 2020-06-22 RX ORDER — ERYTHROPOIETIN 10000 [IU]/ML
80000 INJECTION, SOLUTION INTRAVENOUS; SUBCUTANEOUS ONCE
Refills: 0 | Status: COMPLETED | OUTPATIENT
Start: 2020-06-22 | End: 2020-06-22

## 2020-06-22 RX ADMIN — AZACITIDINE FOR 123 MILLIGRAM(S): 100 INJECTION, POWDER, LYOPHILIZED, FOR SOLUTION INTRAVENOUS; SUBCUTANEOUS at 16:06

## 2020-06-22 RX ADMIN — ONDANSETRON 8 MILLIGRAM(S): 8 TABLET, FILM COATED ORAL at 15:44

## 2020-06-22 RX ADMIN — ERYTHROPOIETIN 80000 UNIT(S): 10000 INJECTION, SOLUTION INTRAVENOUS; SUBCUTANEOUS at 15:45

## 2020-06-22 NOTE — ASSESSMENT
[FreeTextEntry1] : # Lowrisk myelodysplastic syndrome, previously treated with  Revlimid and Procrit, now s/p 2 cycles of Vidaza which she tolerated well\par # Vitamin  B12 deficiency.\par \par # Thrombocytosis; continue ASA\par \par # Hyperkalemia likely due to increased plts; pseudohyperkalemia: check plasma K\par \par Plan:\par Proceed with Cycle # 3 of Vidaza sc daily x 7 days Q 28 days.\par SE discussed.\par \par \par Continue with Procrit 80,000 units weekly.\par Continue B12 injections monthly. \par \par Pt will receive 1 unit PRBCS.\par \par Will monitor pt with weekly CBCs.\par \par The antineoplastic therapy dose was adjusted according to pt's labs and anticipated tolerance.\par The high risk of complications and complexity associated with antineoplastic therapy administration has been explained to the pt and family.\par Treatment will be administered under my supervision.\par \par \par RTC in 4 weeks.\par \par

## 2020-06-22 NOTE — HISTORY OF PRESENT ILLNESS
[de-identified] : She missed on appointment for procrit last week.\par She starts her next cycle on 6/25/18\par C/o back pain radiating down her left which occurred when she bent to  something.\par No change in diarrhea.\par \par 7/31/18:\par Doing well. On Revlimid for more than 2yrs, 5mg 2weeks on 1 week off. She will be starting week on tonight. \par C/o diarrhea. On Imodium 2 pills AM and 2 pills PM. Doesn't help much with diarrhea. \par C/o nausea, abd pain. \par Colonoscopy in 2016 was normal. \par Did not have blood transfusion for over 2 years. \par On procrit [id number] weekly. Missed last week on 7/24/18 as she was out of town. She has been non compliant with weekly Procrit.\par Mammogram in 2017 was normal. \par \par 9/11/18\par pt is here for follow up.\par She feels the lomotil helps with the diarrhea.\par She was away for a few weeks and missed her procrit injections.\par No fever, abdominal  discomfort has been less since since use of lomotil.\par She started a new cycle 2 days ago.\par \par 10/2/18:\par She will start Revlimid tonight (week on). 2 weeks on, 1 week off. \par On ASA 81mg. \par Denies fever, nausea, vomiting, chest pain, SOB, abdominal pain, and bladder problems.\par C/o diarrhea. \par S/p 2 units PRBC transfusion on 9/25/18. \par On Procrit 69672 units weekly. Not compliant every week. \par C/o intermittent dizziness. \par \par 10/31/18\par Pt is here for follow up.\par C/O significant fatigue.\par Continues to have diarrhea, takes imodium and lomotil as needed.\par C/o dry cough, no fever.\par Cough started a month ago. It is worse in the mornings however over last week it has been constant all day.\par No chest pain.\par She was found to have low B12 levels and was started on B12 injections.\par \par 11/27/18:\par Doing well. Hospitalized for 3 days for cellulitis/abcess of the back s/p drainage and on Abx currently. Developed 3 days after Mg infusion as per pt. \par Denies nausea, vomiting, chest pain, SOB, abdominal pain, bowel and bladder problems.\par This is the week on Revlimid (week 1).\par S/p 1 unit PRBC transfusion in the hospital. \par On Procrit weekly \par \par 12/27/18:\par Doing well. No major complaints.\par Denies fever, nausea, vomiting, chest pain, SOB, abdominal pain, and bladder problems.\par On Revlimid 5 mg 2 weeks on 1 week off. This is the week off. She will start the week on sunday (12/30/18).\par Due for Procrit 92890 units today. \par Still has diarrhea. 4-5bm/day.\par On monthly B12 injections. \par \par 1/30/19:\par Patient here for follow up for MDS.  She is taking Revlimid 5 mg, 2 weeks on, 1 week off.  She will complete this current cycle on Saturday.  She has no new complaints today.  Patient denies cough, shortness of breath, denies fever, denies bone pain.\par \par 03/04/2019\par Patient is here for a follow up visit. She complaints of severe pain in her left shoulder and has had abscess drained 2 weeks ago. However the pain is worse and she has purulent discharge on examination. She also has Nasal sores that are painful. Culture from 11/2018 showed MRSA.  Denies Fever. \par She also complaints of swelling in her right leg. Not tender and not erythematous and negative Gong;s sign. \par Her diarrhea with Revlimid is ongoing and Imodium and Lomotil helps but not everyday. \par She is on 60,000 units of procrit weekly and Revlimid 5 mg 2 weeks on 1 week off. \par \par 4/23/19\par Pt is here for follow up.\par She feels well.\par The nasal sores have improved with bactroban\par The abscess on left shoulder has resolved.\par However she has a new one on the middle of her back.\par No fever.\par Pt has been on procrit 02493 units weekly, however she missed a dose in between.\par \par 5/8/19\par pt is here for follow up.\par no new complaints.\par feels well.\par Her skin abscess has resolved.\par she has been unable to go to ID, as she could not get an appt.\par No bleeding.\par She is compliant with revlimid.\par \par 6/6/19\par Pt is here for follow up.\par no new complaints \par Feels well.\par Is on week 2 of her Revlimid cycle. \par No transfusions since last visit\par \par 7/17/19\par Pt is here for follow up.\par C/O fatigue.\par Was away for a few days two weeks ago, but missed treatment with procrit for 2 weeks.\par Is complaint with Revlimid 2 weeks on 1 week off.\par Diarrhea is a little improved.\par \par \par 8/14/19\par Patient here for follow up visit, feeling well.  Although she is complaining of some pain at the left scapula area recently.  Patient denies cough, shortness of breath, denies fever, denies other bone pain.  She is off Revlimid  this week, due to restart on Monday.  She is scheduled for Procrit and Vitamin B12 injection today.  She plans to leave the country tomorrow to visit her mother who is ill.  She will return in about 10 days.\par \par 9/11/19\par Pt is here for follow up.\par She was away for 3 weeks, out of which she took procrit for 2 weeks in [place].\par She missed last week's dose.\par She had CBCs in [place] which showed Hgb of 8.9\par She feels well.\par She still getting diarrhea.\par She has been using lomotil with very minimal relief.\par She started a new cycle of Revlimid 4 days ago.\par \par 10/3/19\par Patient here for follow up visit, feeling fairly well.   Patient denies cough, shortness of breath, denies fever, denies other bone pain.  She remains on Revlimid.  She is scheduled for Procrit and Vitamin B12 injection today.\par \par 10/24/19\par Pt is here for follow up.\par Feels well.\par No SOB, fatigue.\par Compliant with procrit and Revl;imid.\par Remains on ASa.\par Diarrhea is unchanged.\par Pt takes imodium as needed.\par \par 11/14/19\par Pt is here for follow up.\par No new complaints.\par Starts a new cycle of Revlimid today.\par Diarrhea is same as before, no rectal bleeding.\par No fever.\par \par 12/5/19\par Pt is here for follow up.\par No new complaints.\par Denies feeling weaker than usual.\par No fever, SOB.\par No change in frequency of diarrhea.\par No pain.\par Will start next cycle of Revlimid in 3 days.\par \par \par !/16/20\par Pt was recently DCed from [facility] 3 days ago after being treated for pneumonia and MARCO.\par She is on po Levaquin.\par She restarted Revlmid 3 days ago.\par She is feeling better now. No fever.\par No SOB, Chest pain and back pain has resolved.\par Pt has a cough, no expectoration.\par She also recd a unit of PRBCs last week in the hospital\par \par \par 1/30/20\par Patient here for follow up visit, feeling well.  She has no new complaints. Cough is almost gone completely.  Patient denies shortness of breath, denies fever, denies bone pain.  Her appetite is ok, weight is stable.  She is due to restart Revlimid on Monday.\par \par 02/20/20\par Pt is here for follow up, feeling well.\par Offers no new complaints. Denies SOB, fever, chills, weight loss, CP, cough. \par Currently off week of Revlimid 5 mg. Restarts on Monday.\par Has procrit 80,000 units today. Next b 12 injection due in 2 weeks \par Did not get chest Xray\par CBC reviewed WBC 3.1, h/h 8.3/25%, plt 386, neutrophils 1.29\par \par 3/12/20\par Pt is here for follow up.\par She took Revlimid for 2 weeks and then has been off for one week although she was advised to take it daily without break.\par No SOB, Cough, fever.\par Has mild fatigue.\par \par 04/02/2020\par SIMONA KUHN a 75 year F is here today for follow up visit of MDS.\par Denies fever, chills, cough,night sweats, weight loss. \par Denies bleeding or bruising.\par Pt receives procrit 80,000 units weekly and B12 IM monthly. \par Continued Revlimid 5 mg daily x 3 weeks, has 1 dose left tonight. \par CBC reviewed: WBC 3.2, H/H 8.1/25.2, neutrophils 1.6, PLT 72\par \par 4/20/2020: The patient is here for follow up . She has no complaints of fever, chills, dizziness , chest pain and shortness of breath . She is still with diarrhea , up to 10 watery BMs per day, responds poorly to imodium and lomotil. She is on Revlimid 5 mg daily , took last dose yesterday night. Her last Procrit was on April 13. \par \par 5/26/20\par Pt is here for follow up.\par She is feeling well. Denies SOB, chest pain, fever.\par Continues to have diarrhea although she is off of Revlmid.\par Thinks it may due to diabetic meds.\par Wants to discuss BM bx results\par \par 6/22/20\par Pt is here for follow up.\par Feels well. Denies any fever, N, V, D\par Continues to have diarrhea, she will talk to her PCP about changing metformin for DM.\par Has been needing PRBCs 1 unit each month [de-identified] : \par

## 2020-06-23 ENCOUNTER — APPOINTMENT (OUTPATIENT)
Dept: INFUSION THERAPY | Facility: CLINIC | Age: 75
End: 2020-06-23

## 2020-06-23 LAB
ALBUMIN SERPL ELPH-MCNC: 4.7 G/DL
ALP BLD-CCNC: 73 U/L
ALT SERPL-CCNC: 10 U/L
ANION GAP SERPL CALC-SCNC: 17 MMOL/L
AST SERPL-CCNC: 10 U/L
BILIRUB SERPL-MCNC: 0.8 MG/DL
BUN SERPL-MCNC: 26 MG/DL
CALCIUM SERPL-MCNC: 9.5 MG/DL
CHLORIDE SERPL-SCNC: 102 MMOL/L
CO2 SERPL-SCNC: 16 MMOL/L
CREAT SERPL-MCNC: 1.4 MG/DL
GLUCOSE SERPL-MCNC: 295 MG/DL
POTASSIUM SERPL-SCNC: 4.7 MMOL/L
POTASSIUM SERPL-SCNC: 5.5 MMOL/L
PROT SERPL-MCNC: 6.7 G/DL
SODIUM SERPL-SCNC: 135 MMOL/L

## 2020-06-23 RX ORDER — ONDANSETRON 8 MG/1
8 TABLET, FILM COATED ORAL ONCE
Refills: 0 | Status: COMPLETED | OUTPATIENT
Start: 2020-06-23 | End: 2020-06-23

## 2020-06-23 RX ORDER — AZACITIDINE FOR 100 MG/1
123 INJECTION, POWDER, LYOPHILIZED, FOR SOLUTION INTRAVENOUS; SUBCUTANEOUS ONCE
Refills: 0 | Status: COMPLETED | OUTPATIENT
Start: 2020-06-23 | End: 2020-06-23

## 2020-06-23 RX ADMIN — ONDANSETRON 8 MILLIGRAM(S): 8 TABLET, FILM COATED ORAL at 12:22

## 2020-06-23 RX ADMIN — AZACITIDINE FOR 123 MILLIGRAM(S): 100 INJECTION, POWDER, LYOPHILIZED, FOR SOLUTION INTRAVENOUS; SUBCUTANEOUS at 12:45

## 2020-06-24 ENCOUNTER — APPOINTMENT (OUTPATIENT)
Dept: INFUSION THERAPY | Facility: CLINIC | Age: 75
End: 2020-06-24

## 2020-06-24 RX ORDER — ONDANSETRON 8 MG/1
8 TABLET, FILM COATED ORAL ONCE
Refills: 0 | Status: COMPLETED | OUTPATIENT
Start: 2020-06-24 | End: 2020-06-24

## 2020-06-24 RX ORDER — AZACITIDINE FOR 100 MG/1
123 INJECTION, POWDER, LYOPHILIZED, FOR SOLUTION INTRAVENOUS; SUBCUTANEOUS ONCE
Refills: 0 | Status: COMPLETED | OUTPATIENT
Start: 2020-06-24 | End: 2020-06-24

## 2020-06-24 RX ADMIN — ONDANSETRON 8 MILLIGRAM(S): 8 TABLET, FILM COATED ORAL at 15:11

## 2020-06-24 RX ADMIN — AZACITIDINE FOR 123 MILLIGRAM(S): 100 INJECTION, POWDER, LYOPHILIZED, FOR SOLUTION INTRAVENOUS; SUBCUTANEOUS at 16:25

## 2020-06-25 ENCOUNTER — APPOINTMENT (OUTPATIENT)
Dept: INFUSION THERAPY | Facility: CLINIC | Age: 75
End: 2020-06-25

## 2020-06-25 RX ORDER — ONDANSETRON 8 MG/1
8 TABLET, FILM COATED ORAL ONCE
Refills: 0 | Status: COMPLETED | OUTPATIENT
Start: 2020-06-25 | End: 2020-06-25

## 2020-06-25 RX ORDER — AZACITIDINE FOR 100 MG/1
123 INJECTION, POWDER, LYOPHILIZED, FOR SOLUTION INTRAVENOUS; SUBCUTANEOUS ONCE
Refills: 0 | Status: COMPLETED | OUTPATIENT
Start: 2020-06-25 | End: 2020-06-25

## 2020-06-25 RX ADMIN — AZACITIDINE FOR 123 MILLIGRAM(S): 100 INJECTION, POWDER, LYOPHILIZED, FOR SOLUTION INTRAVENOUS; SUBCUTANEOUS at 13:35

## 2020-06-25 RX ADMIN — ONDANSETRON 8 MILLIGRAM(S): 8 TABLET, FILM COATED ORAL at 12:17

## 2020-06-26 ENCOUNTER — APPOINTMENT (OUTPATIENT)
Dept: INFUSION THERAPY | Facility: CLINIC | Age: 75
End: 2020-06-26

## 2020-06-26 RX ORDER — ONDANSETRON 8 MG/1
8 TABLET, FILM COATED ORAL ONCE
Refills: 0 | Status: COMPLETED | OUTPATIENT
Start: 2020-06-26 | End: 2020-06-26

## 2020-06-26 RX ORDER — AZACITIDINE FOR 100 MG/1
123 INJECTION, POWDER, LYOPHILIZED, FOR SOLUTION INTRAVENOUS; SUBCUTANEOUS ONCE
Refills: 0 | Status: COMPLETED | OUTPATIENT
Start: 2020-06-26 | End: 2020-06-26

## 2020-06-26 RX ADMIN — AZACITIDINE FOR 123 MILLIGRAM(S): 100 INJECTION, POWDER, LYOPHILIZED, FOR SOLUTION INTRAVENOUS; SUBCUTANEOUS at 13:25

## 2020-06-26 RX ADMIN — ONDANSETRON 8 MILLIGRAM(S): 8 TABLET, FILM COATED ORAL at 12:33

## 2020-06-29 ENCOUNTER — LABORATORY RESULT (OUTPATIENT)
Age: 75
End: 2020-06-29

## 2020-06-29 ENCOUNTER — APPOINTMENT (OUTPATIENT)
Dept: INFUSION THERAPY | Facility: CLINIC | Age: 75
End: 2020-06-29

## 2020-06-29 LAB
HCT VFR BLD CALC: 22 %
HGB BLD-MCNC: 7.1 G/DL
MCHC RBC-ENTMCNC: 32.3 G/DL
MCHC RBC-ENTMCNC: 33.3 PG
MCV RBC AUTO: 103.3 FL
PLATELET # BLD AUTO: 1076 K/UL
PMV BLD: 9.7 FL
RBC # BLD: 2.13 M/UL
RBC # FLD: 18.6 %
WBC # FLD AUTO: 5.68 K/UL

## 2020-06-29 RX ORDER — AZACITIDINE FOR 100 MG/1
123 INJECTION, POWDER, LYOPHILIZED, FOR SOLUTION INTRAVENOUS; SUBCUTANEOUS ONCE
Refills: 0 | Status: COMPLETED | OUTPATIENT
Start: 2020-06-29 | End: 2020-06-29

## 2020-06-29 RX ORDER — ONDANSETRON 8 MG/1
8 TABLET, FILM COATED ORAL ONCE
Refills: 0 | Status: COMPLETED | OUTPATIENT
Start: 2020-06-29 | End: 2020-06-29

## 2020-06-29 RX ORDER — ERYTHROPOIETIN 10000 [IU]/ML
80000 INJECTION, SOLUTION INTRAVENOUS; SUBCUTANEOUS ONCE
Refills: 0 | Status: COMPLETED | OUTPATIENT
Start: 2020-06-29 | End: 2020-06-29

## 2020-06-29 RX ADMIN — ERYTHROPOIETIN 80000 UNIT(S): 10000 INJECTION, SOLUTION INTRAVENOUS; SUBCUTANEOUS at 14:30

## 2020-06-29 RX ADMIN — ONDANSETRON 8 MILLIGRAM(S): 8 TABLET, FILM COATED ORAL at 14:31

## 2020-06-29 RX ADMIN — AZACITIDINE FOR 123 MILLIGRAM(S): 100 INJECTION, POWDER, LYOPHILIZED, FOR SOLUTION INTRAVENOUS; SUBCUTANEOUS at 15:30

## 2020-06-30 ENCOUNTER — APPOINTMENT (OUTPATIENT)
Dept: INFUSION THERAPY | Facility: CLINIC | Age: 75
End: 2020-06-30

## 2020-06-30 RX ORDER — AZACITIDINE FOR 100 MG/1
123 INJECTION, POWDER, LYOPHILIZED, FOR SOLUTION INTRAVENOUS; SUBCUTANEOUS ONCE
Refills: 0 | Status: COMPLETED | OUTPATIENT
Start: 2020-06-30 | End: 2020-06-30

## 2020-06-30 RX ORDER — ONDANSETRON 8 MG/1
8 TABLET, FILM COATED ORAL ONCE
Refills: 0 | Status: COMPLETED | OUTPATIENT
Start: 2020-06-30 | End: 2020-06-30

## 2020-06-30 RX ADMIN — ONDANSETRON 8 MILLIGRAM(S): 8 TABLET, FILM COATED ORAL at 13:34

## 2020-06-30 RX ADMIN — AZACITIDINE FOR 123 MILLIGRAM(S): 100 INJECTION, POWDER, LYOPHILIZED, FOR SOLUTION INTRAVENOUS; SUBCUTANEOUS at 14:18

## 2020-07-06 ENCOUNTER — APPOINTMENT (OUTPATIENT)
Dept: INFUSION THERAPY | Facility: CLINIC | Age: 75
End: 2020-07-06

## 2020-07-13 ENCOUNTER — APPOINTMENT (OUTPATIENT)
Dept: INFUSION THERAPY | Facility: CLINIC | Age: 75
End: 2020-07-13

## 2020-07-13 ENCOUNTER — LABORATORY RESULT (OUTPATIENT)
Age: 75
End: 2020-07-13

## 2020-07-13 LAB
HCT VFR BLD CALC: 18.4 %
HGB BLD-MCNC: 6.2 G/DL
MCHC RBC-ENTMCNC: 33.7 G/DL
MCHC RBC-ENTMCNC: 34.1 PG
MCV RBC AUTO: 101.1 FL
PLATELET # BLD AUTO: 503 K/UL
PMV BLD: 10.4 FL
RBC # BLD: 1.82 M/UL
RBC # FLD: 19.2 %
WBC # FLD AUTO: 4.91 K/UL

## 2020-07-13 RX ORDER — PREGABALIN 225 MG/1
1000 CAPSULE ORAL ONCE
Refills: 0 | Status: COMPLETED | OUTPATIENT
Start: 2020-07-13 | End: 2020-07-13

## 2020-07-13 RX ORDER — ERYTHROPOIETIN 10000 [IU]/ML
80000 INJECTION, SOLUTION INTRAVENOUS; SUBCUTANEOUS ONCE
Refills: 0 | Status: COMPLETED | OUTPATIENT
Start: 2020-07-13 | End: 2020-07-13

## 2020-07-13 RX ADMIN — ERYTHROPOIETIN 80000 UNIT(S): 10000 INJECTION, SOLUTION INTRAVENOUS; SUBCUTANEOUS at 15:43

## 2020-07-13 RX ADMIN — PREGABALIN 1000 MICROGRAM(S): 225 CAPSULE ORAL at 15:43

## 2020-07-14 ENCOUNTER — APPOINTMENT (OUTPATIENT)
Dept: INFUSION THERAPY | Facility: CLINIC | Age: 75
End: 2020-07-14

## 2020-07-14 LAB
ABO + RH PNL BLD: NORMAL
BLD GP AB SCN SERPL QL: NORMAL

## 2020-07-20 ENCOUNTER — APPOINTMENT (OUTPATIENT)
Dept: INFUSION THERAPY | Facility: CLINIC | Age: 75
End: 2020-07-20

## 2020-07-20 ENCOUNTER — APPOINTMENT (OUTPATIENT)
Dept: HEMATOLOGY ONCOLOGY | Facility: CLINIC | Age: 75
End: 2020-07-20
Payer: MEDICARE

## 2020-07-20 ENCOUNTER — LABORATORY RESULT (OUTPATIENT)
Age: 75
End: 2020-07-20

## 2020-07-20 VITALS
SYSTOLIC BLOOD PRESSURE: 120 MMHG | BODY MASS INDEX: 25.03 KG/M2 | HEIGHT: 62 IN | DIASTOLIC BLOOD PRESSURE: 53 MMHG | WEIGHT: 136 LBS | RESPIRATION RATE: 16 BRPM | HEART RATE: 85 BPM | TEMPERATURE: 97.7 F

## 2020-07-20 LAB
HCT VFR BLD CALC: 23.4 %
HGB BLD-MCNC: 7.8 G/DL
MCHC RBC-ENTMCNC: 33.1 PG
MCHC RBC-ENTMCNC: 33.3 G/DL
MCV RBC AUTO: 99.2 FL
PLATELET # BLD AUTO: 1081 K/UL
PMV BLD: 9.7 FL
RBC # BLD: 2.36 M/UL
RBC # FLD: 18.2 %
WBC # FLD AUTO: 6.59 K/UL

## 2020-07-20 PROCEDURE — 99215 OFFICE O/P EST HI 40 MIN: CPT

## 2020-07-20 RX ORDER — AZACITIDINE FOR 100 MG/1
123 INJECTION, POWDER, LYOPHILIZED, FOR SOLUTION INTRAVENOUS; SUBCUTANEOUS ONCE
Refills: 0 | Status: COMPLETED | OUTPATIENT
Start: 2020-07-20 | End: 2020-07-20

## 2020-07-20 RX ORDER — ERYTHROPOIETIN 10000 [IU]/ML
80000 INJECTION, SOLUTION INTRAVENOUS; SUBCUTANEOUS ONCE
Refills: 0 | Status: COMPLETED | OUTPATIENT
Start: 2020-07-20 | End: 2020-07-20

## 2020-07-20 RX ORDER — ONDANSETRON 8 MG/1
8 TABLET, FILM COATED ORAL ONCE
Refills: 0 | Status: COMPLETED | OUTPATIENT
Start: 2020-07-20 | End: 2020-07-20

## 2020-07-20 RX ADMIN — ERYTHROPOIETIN 80000 UNIT(S): 10000 INJECTION, SOLUTION INTRAVENOUS; SUBCUTANEOUS at 14:50

## 2020-07-20 RX ADMIN — AZACITIDINE FOR 123 MILLIGRAM(S): 100 INJECTION, POWDER, LYOPHILIZED, FOR SOLUTION INTRAVENOUS; SUBCUTANEOUS at 14:55

## 2020-07-20 RX ADMIN — ONDANSETRON 8 MILLIGRAM(S): 8 TABLET, FILM COATED ORAL at 14:33

## 2020-07-20 NOTE — ASSESSMENT
[FreeTextEntry1] : # Lowrisk myelodysplastic syndrome, previously treated with  Revlimid and Procrit, now s/p 3 cycles of Vidaza which she tolerated well. 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 The plt count usually drops with Vidaza treatment.\par \par # Vitamin  B12 deficiency.\par \par # Thrombocytosis; continue ASA\par \par # Hyperkalemia likely due to increased plts; pseudohyperkalemia: check plasma K\par \par Plan:\par Proceed with Cycle # 4 of Vidaza sc daily x 7 days Q 28 days.\par SE discussed.\par \par \par Continue with Procrit 80,000 units weekly.\par Continue B12 injections monthly. \par \par \par Will monitor pt with weekly CBCs.\par \par The antineoplastic therapy dose was adjusted according to pt's labs and anticipated tolerance.\par The high risk of complications and complexity associated with antineoplastic therapy administration has been explained to the pt and family.\par Treatment will be administered under my supervision.\par \par \par RTC in 4 weeks.\par \par

## 2020-07-20 NOTE — HISTORY OF PRESENT ILLNESS
[de-identified] : She missed on appointment for procrit last week.\par She starts her next cycle on 6/25/18\par C/o back pain radiating down her left which occurred when she bent to  something.\par No change in diarrhea.\par \par 7/31/18:\par Doing well. On Revlimid for more than 2yrs, 5mg 2weeks on 1 week off. She will be starting week on tonight. \par C/o diarrhea. On Imodium 2 pills AM and 2 pills PM. Doesn't help much with diarrhea. \par C/o nausea, abd pain. \par Colonoscopy in 2016 was normal. \par Did not have blood transfusion for over 2 years. \par On procrit [id number] weekly. Missed last week on 7/24/18 as she was out of town. She has been non compliant with weekly Procrit.\par Mammogram in 2017 was normal. \par \par 9/11/18\par pt is here for follow up.\par She feels the lomotil helps with the diarrhea.\par She was away for a few weeks and missed her procrit injections.\par No fever, abdominal  discomfort has been less since since use of lomotil.\par She started a new cycle 2 days ago.\par \par 10/2/18:\par She will start Revlimid tonight (week on). 2 weeks on, 1 week off. \par On ASA 81mg. \par Denies fever, nausea, vomiting, chest pain, SOB, abdominal pain, and bladder problems.\par C/o diarrhea. \par S/p 2 units PRBC transfusion on 9/25/18. \par On Procrit 40407 units weekly. Not compliant every week. \par C/o intermittent dizziness. \par \par 10/31/18\par Pt is here for follow up.\par C/O significant fatigue.\par Continues to have diarrhea, takes imodium and lomotil as needed.\par C/o dry cough, no fever.\par Cough started a month ago. It is worse in the mornings however over last week it has been constant all day.\par No chest pain.\par She was found to have low B12 levels and was started on B12 injections.\par \par 11/27/18:\par Doing well. Hospitalized for 3 days for cellulitis/abcess of the back s/p drainage and on Abx currently. Developed 3 days after Mg infusion as per pt. \par Denies nausea, vomiting, chest pain, SOB, abdominal pain, bowel and bladder problems.\par This is the week on Revlimid (week 1).\par S/p 1 unit PRBC transfusion in the hospital. \par On Procrit weekly \par \par 12/27/18:\par Doing well. No major complaints.\par Denies fever, nausea, vomiting, chest pain, SOB, abdominal pain, and bladder problems.\par On Revlimid 5 mg 2 weeks on 1 week off. This is the week off. She will start the week on sunday (12/30/18).\par Due for Procrit 03955 units today. \par Still has diarrhea. 4-5bm/day.\par On monthly B12 injections. \par \par 1/30/19:\par Patient here for follow up for MDS.  She is taking Revlimid 5 mg, 2 weeks on, 1 week off.  She will complete this current cycle on Saturday.  She has no new complaints today.  Patient denies cough, shortness of breath, denies fever, denies bone pain.\par \par 03/04/2019\par Patient is here for a follow up visit. She complaints of severe pain in her left shoulder and has had abscess drained 2 weeks ago. However the pain is worse and she has purulent discharge on examination. She also has Nasal sores that are painful. Culture from 11/2018 showed MRSA.  Denies Fever. \par She also complaints of swelling in her right leg. Not tender and not erythematous and negative Gong;s sign. \par Her diarrhea with Revlimid is ongoing and Imodium and Lomotil helps but not everyday. \par She is on 60,000 units of procrit weekly and Revlimid 5 mg 2 weeks on 1 week off. \par \par 4/23/19\par Pt is here for follow up.\par She feels well.\par The nasal sores have improved with bactroban\par The abscess on left shoulder has resolved.\par However she has a new one on the middle of her back.\par No fever.\par Pt has been on procrit 34665 units weekly, however she missed a dose in between.\par \par 5/8/19\par pt is here for follow up.\par no new complaints.\par feels well.\par Her skin abscess has resolved.\par she has been unable to go to ID, as she could not get an appt.\par No bleeding.\par She is compliant with revlimid.\par \par 6/6/19\par Pt is here for follow up.\par no new complaints \par Feels well.\par Is on week 2 of her Revlimid cycle. \par No transfusions since last visit\par \par 7/17/19\par Pt is here for follow up.\par C/O fatigue.\par Was away for a few days two weeks ago, but missed treatment with procrit for 2 weeks.\par Is complaint with Revlimid 2 weeks on 1 week off.\par Diarrhea is a little improved.\par \par \par 8/14/19\par Patient here for follow up visit, feeling well.  Although she is complaining of some pain at the left scapula area recently.  Patient denies cough, shortness of breath, denies fever, denies other bone pain.  She is off Revlimid  this week, due to restart on Monday.  She is scheduled for Procrit and Vitamin B12 injection today.  She plans to leave the country tomorrow to visit her mother who is ill.  She will return in about 10 days.\par \par 9/11/19\par Pt is here for follow up.\par She was away for 3 weeks, out of which she took procrit for 2 weeks in [place].\par She missed last week's dose.\par She had CBCs in [place] which showed Hgb of 8.9\par She feels well.\par She still getting diarrhea.\par She has been using lomotil with very minimal relief.\par She started a new cycle of Revlimid 4 days ago.\par \par 10/3/19\par Patient here for follow up visit, feeling fairly well.   Patient denies cough, shortness of breath, denies fever, denies other bone pain.  She remains on Revlimid.  She is scheduled for Procrit and Vitamin B12 injection today.\par \par 10/24/19\par Pt is here for follow up.\par Feels well.\par No SOB, fatigue.\par Compliant with procrit and Revl;imid.\par Remains on ASa.\par Diarrhea is unchanged.\par Pt takes imodium as needed.\par \par 11/14/19\par Pt is here for follow up.\par No new complaints.\par Starts a new cycle of Revlimid today.\par Diarrhea is same as before, no rectal bleeding.\par No fever.\par \par 12/5/19\par Pt is here for follow up.\par No new complaints.\par Denies feeling weaker than usual.\par No fever, SOB.\par No change in frequency of diarrhea.\par No pain.\par Will start next cycle of Revlimid in 3 days.\par \par \par !/16/20\par Pt was recently DCed from [facility] 3 days ago after being treated for pneumonia and MARCO.\par She is on po Levaquin.\par She restarted Revlmid 3 days ago.\par She is feeling better now. No fever.\par No SOB, Chest pain and back pain has resolved.\par Pt has a cough, no expectoration.\par She also recd a unit of PRBCs last week in the hospital\par \par \par 1/30/20\par Patient here for follow up visit, feeling well.  She has no new complaints. Cough is almost gone completely.  Patient denies shortness of breath, denies fever, denies bone pain.  Her appetite is ok, weight is stable.  She is due to restart Revlimid on Monday.\par \par 02/20/20\par Pt is here for follow up, feeling well.\par Offers no new complaints. Denies SOB, fever, chills, weight loss, CP, cough. \par Currently off week of Revlimid 5 mg. Restarts on Monday.\par Has procrit 80,000 units today. Next b 12 injection due in 2 weeks \par Did not get chest Xray\par CBC reviewed WBC 3.1, h/h 8.3/25%, plt 386, neutrophils 1.29\par \par 3/12/20\par Pt is here for follow up.\par She took Revlimid for 2 weeks and then has been off for one week although she was advised to take it daily without break.\par No SOB, Cough, fever.\par Has mild fatigue.\par \par 04/02/2020\par SIMONA KUHN a 75 year F is here today for follow up visit of MDS.\par Denies fever, chills, cough,night sweats, weight loss. \par Denies bleeding or bruising.\par Pt receives procrit 80,000 units weekly and B12 IM monthly. \par Continued Revlimid 5 mg daily x 3 weeks, has 1 dose left tonight. \par CBC reviewed: WBC 3.2, H/H 8.1/25.2, neutrophils 1.6, PLT 72\par \par 4/20/2020: The patient is here for follow up . She has no complaints of fever, chills, dizziness , chest pain and shortness of breath . She is still with diarrhea , up to 10 watery BMs per day, responds poorly to imodium and lomotil. She is on Revlimid 5 mg daily , took last dose yesterday night. Her last Procrit was on April 13. \par \par 5/26/20\par Pt is here for follow up.\par She is feeling well. Denies SOB, chest pain, fever.\par Continues to have diarrhea although she is off of Revlmid.\par Thinks it may due to diabetic meds.\par Wants to discuss BM bx results\par \par 6/22/20\par Pt is here for follow up.\par Feels well. Denies any fever, N, V, D\par Continues to have diarrhea, she will talk to her PCP about changing metformin for DM.\par Has been needing PRBCs 1 unit each month\par \par 7/20/20\par Pt is here for follow up.\par No new complaints. Has been feeling well.\par No SOB, CP. \par No N, V, D.\par She has recd 2 units of PRBCs since May 20. [de-identified] : \par

## 2020-07-21 ENCOUNTER — APPOINTMENT (OUTPATIENT)
Dept: INFUSION THERAPY | Facility: CLINIC | Age: 75
End: 2020-07-21

## 2020-07-21 LAB
ALBUMIN SERPL ELPH-MCNC: 4.6 G/DL
ALP BLD-CCNC: 61 U/L
ALT SERPL-CCNC: 11 U/L
ANION GAP SERPL CALC-SCNC: 16 MMOL/L
AST SERPL-CCNC: 11 U/L
BILIRUB SERPL-MCNC: 0.8 MG/DL
BUN SERPL-MCNC: 20 MG/DL
CALCIUM SERPL-MCNC: 9.5 MG/DL
CHLORIDE SERPL-SCNC: 103 MMOL/L
CO2 SERPL-SCNC: 18 MMOL/L
CREAT SERPL-MCNC: 1.4 MG/DL
GLUCOSE SERPL-MCNC: 134 MG/DL
MAGNESIUM SERPL-MCNC: 2.1 MG/DL
PHOSPHATE SERPL-MCNC: 3.7 MG/DL
POTASSIUM SERPL-SCNC: 5.4 MMOL/L
PROT SERPL-MCNC: 6.5 G/DL
PROT UR-MCNC: 13 MG/DLG/24H
SODIUM SERPL-SCNC: 137 MMOL/L

## 2020-07-21 RX ORDER — AZACITIDINE FOR 100 MG/1
123 INJECTION, POWDER, LYOPHILIZED, FOR SOLUTION INTRAVENOUS; SUBCUTANEOUS ONCE
Refills: 0 | Status: COMPLETED | OUTPATIENT
Start: 2020-07-21 | End: 2020-07-21

## 2020-07-21 RX ORDER — ONDANSETRON 8 MG/1
8 TABLET, FILM COATED ORAL ONCE
Refills: 0 | Status: COMPLETED | OUTPATIENT
Start: 2020-07-21 | End: 2020-07-21

## 2020-07-21 RX ADMIN — ONDANSETRON 8 MILLIGRAM(S): 8 TABLET, FILM COATED ORAL at 12:49

## 2020-07-21 RX ADMIN — AZACITIDINE FOR 123 MILLIGRAM(S): 100 INJECTION, POWDER, LYOPHILIZED, FOR SOLUTION INTRAVENOUS; SUBCUTANEOUS at 13:24

## 2020-07-22 ENCOUNTER — APPOINTMENT (OUTPATIENT)
Dept: INFUSION THERAPY | Facility: CLINIC | Age: 75
End: 2020-07-22

## 2020-07-22 LAB
25(OH)D3 SERPL-MCNC: 13 NG/ML
CALCIUM SERPL-MCNC: 9.2 MG/DL
PARATHYROID HORMONE INTACT: 49 PG/ML

## 2020-07-22 RX ORDER — ONDANSETRON 8 MG/1
8 TABLET, FILM COATED ORAL ONCE
Refills: 0 | Status: COMPLETED | OUTPATIENT
Start: 2020-07-22 | End: 2020-07-22

## 2020-07-22 RX ORDER — AZACITIDINE FOR 100 MG/1
123 INJECTION, POWDER, LYOPHILIZED, FOR SOLUTION INTRAVENOUS; SUBCUTANEOUS ONCE
Refills: 0 | Status: COMPLETED | OUTPATIENT
Start: 2020-07-22 | End: 2020-07-22

## 2020-07-22 RX ADMIN — AZACITIDINE FOR 123 MILLIGRAM(S): 100 INJECTION, POWDER, LYOPHILIZED, FOR SOLUTION INTRAVENOUS; SUBCUTANEOUS at 13:12

## 2020-07-22 RX ADMIN — ONDANSETRON 8 MILLIGRAM(S): 8 TABLET, FILM COATED ORAL at 12:39

## 2020-07-23 ENCOUNTER — APPOINTMENT (OUTPATIENT)
Dept: INFUSION THERAPY | Facility: CLINIC | Age: 75
End: 2020-07-23

## 2020-07-23 ENCOUNTER — LABORATORY RESULT (OUTPATIENT)
Age: 75
End: 2020-07-23

## 2020-07-23 LAB
HCT VFR BLD CALC: 22.1 %
HGB BLD-MCNC: 7 G/DL
MCHC RBC-ENTMCNC: 31.7 G/DL
MCHC RBC-ENTMCNC: 32.6 PG
MCV RBC AUTO: 102.8 FL
PLATELET # BLD AUTO: 995 K/UL
PMV BLD: 9.3 FL
RBC # BLD: 2.15 M/UL
RBC # FLD: 18.4 %
WBC # FLD AUTO: 3.71 K/UL

## 2020-07-23 RX ORDER — AZACITIDINE FOR 100 MG/1
123 INJECTION, POWDER, LYOPHILIZED, FOR SOLUTION INTRAVENOUS; SUBCUTANEOUS ONCE
Refills: 0 | Status: COMPLETED | OUTPATIENT
Start: 2020-07-23 | End: 2020-07-23

## 2020-07-23 RX ORDER — ONDANSETRON 8 MG/1
8 TABLET, FILM COATED ORAL ONCE
Refills: 0 | Status: COMPLETED | OUTPATIENT
Start: 2020-07-23 | End: 2020-07-23

## 2020-07-23 RX ADMIN — ONDANSETRON 8 MILLIGRAM(S): 8 TABLET, FILM COATED ORAL at 14:26

## 2020-07-23 RX ADMIN — AZACITIDINE FOR 123 MILLIGRAM(S): 100 INJECTION, POWDER, LYOPHILIZED, FOR SOLUTION INTRAVENOUS; SUBCUTANEOUS at 14:47

## 2020-07-24 ENCOUNTER — APPOINTMENT (OUTPATIENT)
Dept: INFUSION THERAPY | Facility: CLINIC | Age: 75
End: 2020-07-24

## 2020-07-24 LAB
ABO + RH PNL BLD: NORMAL
BLD GP AB SCN SERPL QL: NORMAL

## 2020-07-24 RX ORDER — ONDANSETRON 8 MG/1
8 TABLET, FILM COATED ORAL ONCE
Refills: 0 | Status: COMPLETED | OUTPATIENT
Start: 2020-07-24 | End: 2020-07-24

## 2020-07-24 RX ORDER — AZACITIDINE FOR 100 MG/1
123 INJECTION, POWDER, LYOPHILIZED, FOR SOLUTION INTRAVENOUS; SUBCUTANEOUS ONCE
Refills: 0 | Status: COMPLETED | OUTPATIENT
Start: 2020-07-24 | End: 2020-07-24

## 2020-07-24 RX ADMIN — AZACITIDINE FOR 123 MILLIGRAM(S): 100 INJECTION, POWDER, LYOPHILIZED, FOR SOLUTION INTRAVENOUS; SUBCUTANEOUS at 13:15

## 2020-07-24 RX ADMIN — ONDANSETRON 8 MILLIGRAM(S): 8 TABLET, FILM COATED ORAL at 12:48

## 2020-07-27 ENCOUNTER — APPOINTMENT (OUTPATIENT)
Dept: HEMATOLOGY ONCOLOGY | Facility: CLINIC | Age: 75
End: 2020-07-27

## 2020-07-27 ENCOUNTER — LABORATORY RESULT (OUTPATIENT)
Age: 75
End: 2020-07-27

## 2020-07-27 ENCOUNTER — APPOINTMENT (OUTPATIENT)
Dept: INFUSION THERAPY | Facility: CLINIC | Age: 75
End: 2020-07-27

## 2020-07-27 LAB
HCT VFR BLD CALC: 21 %
HGB BLD-MCNC: 7 G/DL
IRON SATN MFR SERPL: 57 %
IRON SERPL-MCNC: 140 UG/DL
MCHC RBC-ENTMCNC: 33 PG
MCHC RBC-ENTMCNC: 33.3 G/DL
MCV RBC AUTO: 99.1 FL
PLATELET # BLD AUTO: 1274 K/UL
PMV BLD: 9.6 FL
RBC # BLD: 2.12 M/UL
RBC # FLD: 18.3 %
TIBC SERPL-MCNC: 245 UG/DL
UIBC SERPL-MCNC: 105 UG/DL
WBC # FLD AUTO: 4.28 K/UL

## 2020-07-27 RX ORDER — AZACITIDINE FOR 100 MG/1
123 INJECTION, POWDER, LYOPHILIZED, FOR SOLUTION INTRAVENOUS; SUBCUTANEOUS ONCE
Refills: 0 | Status: COMPLETED | OUTPATIENT
Start: 2020-07-27 | End: 2020-07-27

## 2020-07-27 RX ORDER — ERYTHROPOIETIN 10000 [IU]/ML
80000 INJECTION, SOLUTION INTRAVENOUS; SUBCUTANEOUS ONCE
Refills: 0 | Status: COMPLETED | OUTPATIENT
Start: 2020-07-27 | End: 2020-07-27

## 2020-07-27 RX ORDER — ONDANSETRON 8 MG/1
8 TABLET, FILM COATED ORAL ONCE
Refills: 0 | Status: COMPLETED | OUTPATIENT
Start: 2020-07-27 | End: 2020-07-27

## 2020-07-27 RX ADMIN — ERYTHROPOIETIN 80000 UNIT(S): 10000 INJECTION, SOLUTION INTRAVENOUS; SUBCUTANEOUS at 14:11

## 2020-07-27 RX ADMIN — AZACITIDINE FOR 123 MILLIGRAM(S): 100 INJECTION, POWDER, LYOPHILIZED, FOR SOLUTION INTRAVENOUS; SUBCUTANEOUS at 14:13

## 2020-07-27 RX ADMIN — ONDANSETRON 8 MILLIGRAM(S): 8 TABLET, FILM COATED ORAL at 14:11

## 2020-07-28 ENCOUNTER — APPOINTMENT (OUTPATIENT)
Dept: INFUSION THERAPY | Facility: CLINIC | Age: 75
End: 2020-07-28

## 2020-07-28 LAB — FERRITIN SERPL-MCNC: 1211 NG/ML

## 2020-07-28 RX ORDER — AZACITIDINE FOR 100 MG/1
123 INJECTION, POWDER, LYOPHILIZED, FOR SOLUTION INTRAVENOUS; SUBCUTANEOUS ONCE
Refills: 0 | Status: COMPLETED | OUTPATIENT
Start: 2020-07-28 | End: 2020-07-28

## 2020-07-28 RX ORDER — ONDANSETRON 8 MG/1
8 TABLET, FILM COATED ORAL ONCE
Refills: 0 | Status: COMPLETED | OUTPATIENT
Start: 2020-07-28 | End: 2020-07-28

## 2020-07-28 RX ADMIN — AZACITIDINE FOR 123 MILLIGRAM(S): 100 INJECTION, POWDER, LYOPHILIZED, FOR SOLUTION INTRAVENOUS; SUBCUTANEOUS at 13:55

## 2020-07-28 RX ADMIN — ONDANSETRON 8 MILLIGRAM(S): 8 TABLET, FILM COATED ORAL at 12:55

## 2020-07-29 ENCOUNTER — APPOINTMENT (OUTPATIENT)
Dept: INFUSION THERAPY | Facility: CLINIC | Age: 75
End: 2020-07-29

## 2020-07-30 ENCOUNTER — APPOINTMENT (OUTPATIENT)
Dept: INFUSION THERAPY | Facility: CLINIC | Age: 75
End: 2020-07-30

## 2020-07-31 ENCOUNTER — APPOINTMENT (OUTPATIENT)
Dept: INFUSION THERAPY | Facility: CLINIC | Age: 75
End: 2020-07-31

## 2020-08-03 ENCOUNTER — APPOINTMENT (OUTPATIENT)
Dept: INFUSION THERAPY | Facility: CLINIC | Age: 75
End: 2020-08-03

## 2020-08-03 ENCOUNTER — LABORATORY RESULT (OUTPATIENT)
Age: 75
End: 2020-08-03

## 2020-08-03 RX ORDER — ERYTHROPOIETIN 10000 [IU]/ML
80000 INJECTION, SOLUTION INTRAVENOUS; SUBCUTANEOUS ONCE
Refills: 0 | Status: COMPLETED | OUTPATIENT
Start: 2020-08-03 | End: 2020-08-03

## 2020-08-03 RX ADMIN — ERYTHROPOIETIN 80000 UNIT(S): 10000 INJECTION, SOLUTION INTRAVENOUS; SUBCUTANEOUS at 14:03

## 2020-08-04 ENCOUNTER — APPOINTMENT (OUTPATIENT)
Dept: INFUSION THERAPY | Facility: CLINIC | Age: 75
End: 2020-08-04

## 2020-08-10 ENCOUNTER — LABORATORY RESULT (OUTPATIENT)
Age: 75
End: 2020-08-10

## 2020-08-10 ENCOUNTER — APPOINTMENT (OUTPATIENT)
Dept: INFUSION THERAPY | Facility: CLINIC | Age: 75
End: 2020-08-10

## 2020-08-10 RX ORDER — ERYTHROPOIETIN 10000 [IU]/ML
80000 INJECTION, SOLUTION INTRAVENOUS; SUBCUTANEOUS ONCE
Refills: 0 | Status: COMPLETED | OUTPATIENT
Start: 2020-08-10 | End: 2020-08-10

## 2020-08-10 RX ADMIN — ERYTHROPOIETIN 80000 UNIT(S): 10000 INJECTION, SOLUTION INTRAVENOUS; SUBCUTANEOUS at 11:59

## 2020-08-11 LAB
ABO + RH PNL BLD: NORMAL
AMINO ACID: NORMAL
ASSAY DETAILS: NORMAL
BLD GP AB SCN SERPL QL: NORMAL
BLOCK/SPECIMEN ID: NORMAL
EXON: NORMAL
GENE: NORMAL
HCT VFR BLD CALC: 19.6 %
HCT VFR BLD CALC: 24.2 %
HGB BLD-MCNC: 6.5 G/DL
HGB BLD-MCNC: 8.2 G/DL
JAK2 12 INTERPRETATION: NORMAL
JAK2 12 MUTATIONS: NORMAL
JAK2 12 REFERENCE: NORMAL
JAK2 12-13 TYPE: NORMAL
JAK2 P.V617F BLD/T QL: NOT DETECTED
JAK2-SOURCE: NORMAL
Lab: NORMAL
MCHC RBC-ENTMCNC: 32.2 PG
MCHC RBC-ENTMCNC: 33 PG
MCHC RBC-ENTMCNC: 33.2 G/DL
MCHC RBC-ENTMCNC: 33.9 G/DL
MCV RBC AUTO: 94.9 FL
MCV RBC AUTO: 99.5 FL
NUKLEOID CHANGE: NORMAL
PLATELET # BLD AUTO: 565 K/UL
PLATELET # BLD AUTO: 775 K/UL
PMV BLD: 10.1 FL
PMV BLD: 10.4 FL
RBC # BLD: 1.97 M/UL
RBC # BLD: 2.55 M/UL
RBC # FLD: 18.3 %
RBC # FLD: 18.5 %
WBC # FLD AUTO: 3.58 K/UL
WBC # FLD AUTO: 5.72 K/UL

## 2020-08-17 ENCOUNTER — LABORATORY RESULT (OUTPATIENT)
Age: 75
End: 2020-08-17

## 2020-08-17 ENCOUNTER — APPOINTMENT (OUTPATIENT)
Dept: INFUSION THERAPY | Facility: CLINIC | Age: 75
End: 2020-08-17
Payer: MEDICARE

## 2020-08-17 ENCOUNTER — APPOINTMENT (OUTPATIENT)
Dept: HEMATOLOGY ONCOLOGY | Facility: CLINIC | Age: 75
End: 2020-08-17
Payer: MEDICARE

## 2020-08-17 VITALS
SYSTOLIC BLOOD PRESSURE: 125 MMHG | DIASTOLIC BLOOD PRESSURE: 54 MMHG | TEMPERATURE: 97.7 F | WEIGHT: 139 LBS | HEART RATE: 91 BPM | HEIGHT: 62 IN | BODY MASS INDEX: 25.58 KG/M2

## 2020-08-17 LAB
HCT VFR BLD CALC: 24.1 %
HGB BLD-MCNC: 7.6 G/DL
MCHC RBC-ENTMCNC: 31.5 G/DL
MCHC RBC-ENTMCNC: 31.5 PG
MCV RBC AUTO: 100 FL
PLATELET # BLD AUTO: 1366 K/UL
PMV BLD: 9.8 FL
RBC # BLD: 2.41 M/UL
RBC # FLD: 18.9 %
WBC # FLD AUTO: 4.87 K/UL

## 2020-08-17 PROCEDURE — 99215 OFFICE O/P EST HI 40 MIN: CPT

## 2020-08-17 RX ORDER — ONDANSETRON 8 MG/1
8 TABLET, FILM COATED ORAL ONCE
Refills: 0 | Status: COMPLETED | OUTPATIENT
Start: 2020-08-17 | End: 2020-08-17

## 2020-08-17 RX ORDER — AZACITIDINE FOR 100 MG/1
123 INJECTION, POWDER, LYOPHILIZED, FOR SOLUTION INTRAVENOUS; SUBCUTANEOUS ONCE
Refills: 0 | Status: COMPLETED | OUTPATIENT
Start: 2020-08-17 | End: 2020-08-17

## 2020-08-17 RX ORDER — ERYTHROPOIETIN 10000 [IU]/ML
80000 INJECTION, SOLUTION INTRAVENOUS; SUBCUTANEOUS ONCE
Refills: 0 | Status: COMPLETED | OUTPATIENT
Start: 2020-08-17 | End: 2020-08-17

## 2020-08-17 RX ADMIN — AZACITIDINE FOR 123 MILLIGRAM(S): 100 INJECTION, POWDER, LYOPHILIZED, FOR SOLUTION INTRAVENOUS; SUBCUTANEOUS at 14:35

## 2020-08-17 RX ADMIN — ERYTHROPOIETIN 80000 UNIT(S): 10000 INJECTION, SOLUTION INTRAVENOUS; SUBCUTANEOUS at 13:39

## 2020-08-17 RX ADMIN — ONDANSETRON 8 MILLIGRAM(S): 8 TABLET, FILM COATED ORAL at 13:38

## 2020-08-17 NOTE — ASSESSMENT
[FreeTextEntry1] : # Lowrisk myelodysplastic syndrome, previously treated with  Revlimid and Procrit, now s/p 4 cycles of Vidaza which she tolerated well. 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 The plt count usually drops with Vidaza treatment.\par However todays CBC shows a much higher plt count, warranting further cytoreduction. Pt is asymptomatic. \par \par # Vitamin  B12 deficiency.\par \par # Thrombocytosis; continue ASA. Add Hydrea 1g today and 500mg daily and reassess in 1 week\par \par # Hyperkalemia likely due to increased plts; pseudohyperkalemia: check plasma K\par \par Plan:\par Proceed with Cycle # 4 of Vidaza sc daily x 7 days Q 28 days.\par SE discussed.\par Hydrea added. Pt advised of SE of hydrea, including added myelosuppression from it.\par Advised pt to follow up with Dr Ferrell for second opinion.\par \par Continue with Procrit 80,000 units weekly.\par Continue B12 injections monthly. \par \par \par Will monitor pt with  frequent CBCs.\par \par The antineoplastic therapy dose was adjusted according to pt's labs and anticipated tolerance.\par The high risk of complications and complexity associated with antineoplastic therapy administration has been explained to the pt and family.\par Treatment will be administered under my supervision.\par \par \par RTC in 1 week, may need adjustment to hydrea dosage based on plt count.\par \par

## 2020-08-17 NOTE — HISTORY OF PRESENT ILLNESS
[de-identified] : She missed on appointment for procrit last week.\par She starts her next cycle on 6/25/18\par C/o back pain radiating down her left which occurred when she bent to  something.\par No change in diarrhea.\par \par 7/31/18:\par Doing well. On Revlimid for more than 2yrs, 5mg 2weeks on 1 week off. She will be starting week on tonight. \par C/o diarrhea. On Imodium 2 pills AM and 2 pills PM. Doesn't help much with diarrhea. \par C/o nausea, abd pain. \par Colonoscopy in 2016 was normal. \par Did not have blood transfusion for over 2 years. \par On procrit [id number] weekly. Missed last week on 7/24/18 as she was out of town. She has been non compliant with weekly Procrit.\par Mammogram in 2017 was normal. \par \par 9/11/18\par pt is here for follow up.\par She feels the lomotil helps with the diarrhea.\par She was away for a few weeks and missed her procrit injections.\par No fever, abdominal  discomfort has been less since since use of lomotil.\par She started a new cycle 2 days ago.\par \par 10/2/18:\par She will start Revlimid tonight (week on). 2 weeks on, 1 week off. \par On ASA 81mg. \par Denies fever, nausea, vomiting, chest pain, SOB, abdominal pain, and bladder problems.\par C/o diarrhea. \par S/p 2 units PRBC transfusion on 9/25/18. \par On Procrit 60715 units weekly. Not compliant every week. \par C/o intermittent dizziness. \par \par 10/31/18\par Pt is here for follow up.\par C/O significant fatigue.\par Continues to have diarrhea, takes imodium and lomotil as needed.\par C/o dry cough, no fever.\par Cough started a month ago. It is worse in the mornings however over last week it has been constant all day.\par No chest pain.\par She was found to have low B12 levels and was started on B12 injections.\par \par 11/27/18:\par Doing well. Hospitalized for 3 days for cellulitis/abcess of the back s/p drainage and on Abx currently. Developed 3 days after Mg infusion as per pt. \par Denies nausea, vomiting, chest pain, SOB, abdominal pain, bowel and bladder problems.\par This is the week on Revlimid (week 1).\par S/p 1 unit PRBC transfusion in the hospital. \par On Procrit weekly \par \par 12/27/18:\par Doing well. No major complaints.\par Denies fever, nausea, vomiting, chest pain, SOB, abdominal pain, and bladder problems.\par On Revlimid 5 mg 2 weeks on 1 week off. This is the week off. She will start the week on sunday (12/30/18).\par Due for Procrit 62901 units today. \par Still has diarrhea. 4-5bm/day.\par On monthly B12 injections. \par \par 1/30/19:\par Patient here for follow up for MDS.  She is taking Revlimid 5 mg, 2 weeks on, 1 week off.  She will complete this current cycle on Saturday.  She has no new complaints today.  Patient denies cough, shortness of breath, denies fever, denies bone pain.\par \par 03/04/2019\par Patient is here for a follow up visit. She complaints of severe pain in her left shoulder and has had abscess drained 2 weeks ago. However the pain is worse and she has purulent discharge on examination. She also has Nasal sores that are painful. Culture from 11/2018 showed MRSA.  Denies Fever. \par She also complaints of swelling in her right leg. Not tender and not erythematous and negative Gong;s sign. \par Her diarrhea with Revlimid is ongoing and Imodium and Lomotil helps but not everyday. \par She is on 60,000 units of procrit weekly and Revlimid 5 mg 2 weeks on 1 week off. \par \par 4/23/19\par Pt is here for follow up.\par She feels well.\par The nasal sores have improved with bactroban\par The abscess on left shoulder has resolved.\par However she has a new one on the middle of her back.\par No fever.\par Pt has been on procrit 95229 units weekly, however she missed a dose in between.\par \par 5/8/19\par pt is here for follow up.\par no new complaints.\par feels well.\par Her skin abscess has resolved.\par she has been unable to go to ID, as she could not get an appt.\par No bleeding.\par She is compliant with revlimid.\par \par 6/6/19\par Pt is here for follow up.\par no new complaints \par Feels well.\par Is on week 2 of her Revlimid cycle. \par No transfusions since last visit\par \par 7/17/19\par Pt is here for follow up.\par C/O fatigue.\par Was away for a few days two weeks ago, but missed treatment with procrit for 2 weeks.\par Is complaint with Revlimid 2 weeks on 1 week off.\par Diarrhea is a little improved.\par \par \par 8/14/19\par Patient here for follow up visit, feeling well.  Although she is complaining of some pain at the left scapula area recently.  Patient denies cough, shortness of breath, denies fever, denies other bone pain.  She is off Revlimid  this week, due to restart on Monday.  She is scheduled for Procrit and Vitamin B12 injection today.  She plans to leave the country tomorrow to visit her mother who is ill.  She will return in about 10 days.\par \par 9/11/19\par Pt is here for follow up.\par She was away for 3 weeks, out of which she took procrit for 2 weeks in [place].\par She missed last week's dose.\par She had CBCs in [place] which showed Hgb of 8.9\par She feels well.\par She still getting diarrhea.\par She has been using lomotil with very minimal relief.\par She started a new cycle of Revlimid 4 days ago.\par \par 10/3/19\par Patient here for follow up visit, feeling fairly well.   Patient denies cough, shortness of breath, denies fever, denies other bone pain.  She remains on Revlimid.  She is scheduled for Procrit and Vitamin B12 injection today.\par \par 10/24/19\par Pt is here for follow up.\par Feels well.\par No SOB, fatigue.\par Compliant with procrit and Revl;imid.\par Remains on ASa.\par Diarrhea is unchanged.\par Pt takes imodium as needed.\par \par 11/14/19\par Pt is here for follow up.\par No new complaints.\par Starts a new cycle of Revlimid today.\par Diarrhea is same as before, no rectal bleeding.\par No fever.\par \par 12/5/19\par Pt is here for follow up.\par No new complaints.\par Denies feeling weaker than usual.\par No fever, SOB.\par No change in frequency of diarrhea.\par No pain.\par Will start next cycle of Revlimid in 3 days.\par \par \par !/16/20\par Pt was recently DCed from [facility] 3 days ago after being treated for pneumonia and MARCO.\par She is on po Levaquin.\par She restarted Revlmid 3 days ago.\par She is feeling better now. No fever.\par No SOB, Chest pain and back pain has resolved.\par Pt has a cough, no expectoration.\par She also recd a unit of PRBCs last week in the hospital\par \par \par 1/30/20\par Patient here for follow up visit, feeling well.  She has no new complaints. Cough is almost gone completely.  Patient denies shortness of breath, denies fever, denies bone pain.  Her appetite is ok, weight is stable.  She is due to restart Revlimid on Monday.\par \par 02/20/20\par Pt is here for follow up, feeling well.\par Offers no new complaints. Denies SOB, fever, chills, weight loss, CP, cough. \par Currently off week of Revlimid 5 mg. Restarts on Monday.\par Has procrit 80,000 units today. Next b 12 injection due in 2 weeks \par Did not get chest Xray\par CBC reviewed WBC 3.1, h/h 8.3/25%, plt 386, neutrophils 1.29\par \par 3/12/20\par Pt is here for follow up.\par She took Revlimid for 2 weeks and then has been off for one week although she was advised to take it daily without break.\par No SOB, Cough, fever.\par Has mild fatigue.\par \par 04/02/2020\par SIMONA KUHN a 75 year F is here today for follow up visit of MDS.\par Denies fever, chills, cough,night sweats, weight loss. \par Denies bleeding or bruising.\par Pt receives procrit 80,000 units weekly and B12 IM monthly. \par Continued Revlimid 5 mg daily x 3 weeks, has 1 dose left tonight. \par CBC reviewed: WBC 3.2, H/H 8.1/25.2, neutrophils 1.6, PLT 72\par \par 4/20/2020: The patient is here for follow up . She has no complaints of fever, chills, dizziness , chest pain and shortness of breath . She is still with diarrhea , up to 10 watery BMs per day, responds poorly to imodium and lomotil. She is on Revlimid 5 mg daily , took last dose yesterday night. Her last Procrit was on April 13. \par \par 5/26/20\par Pt is here for follow up.\par She is feeling well. Denies SOB, chest pain, fever.\par Continues to have diarrhea although she is off of Revlmid.\par Thinks it may due to diabetic meds.\par Wants to discuss BM bx results\par \par 6/22/20\par Pt is here for follow up.\par Feels well. Denies any fever, N, V, D\par Continues to have diarrhea, she will talk to her PCP about changing metformin for DM.\par Has been needing PRBCs 1 unit each month\par \par 7/20/20\par Pt is here for follow up.\par No new complaints. Has been feeling well.\par No SOB, CP. \par No N, V, D.\par She has recd 2 units of PRBCs since May 20.\par \par 8/17/20\par Pt is here for a follow up visit.\par She is feeling well.\par No new symptoms. No N, V, D.\par No bruising or bleeding. She continues to need PRBCs every 2-3 weeks. [de-identified] : \par

## 2020-08-18 ENCOUNTER — LABORATORY RESULT (OUTPATIENT)
Age: 75
End: 2020-08-18

## 2020-08-18 ENCOUNTER — APPOINTMENT (OUTPATIENT)
Dept: INFUSION THERAPY | Facility: CLINIC | Age: 75
End: 2020-08-18

## 2020-08-18 LAB
ALBUMIN SERPL ELPH-MCNC: 4.7 G/DL
ALP BLD-CCNC: 53 U/L
ALT SERPL-CCNC: 11 U/L
ANION GAP SERPL CALC-SCNC: 15 MMOL/L
AST SERPL-CCNC: 12 U/L
BILIRUB SERPL-MCNC: 1 MG/DL
BUN SERPL-MCNC: 18 MG/DL
CALCIUM SERPL-MCNC: 9.4 MG/DL
CHLORIDE SERPL-SCNC: 103 MMOL/L
CO2 SERPL-SCNC: 17 MMOL/L
CREAT SERPL-MCNC: 1.3 MG/DL
GLUCOSE SERPL-MCNC: 171 MG/DL
HCT VFR BLD CALC: 22.1 %
HGB BLD-MCNC: 6.7 G/DL
LDH SERPL-CCNC: 214
MCHC RBC-ENTMCNC: 30.3 G/DL
MCHC RBC-ENTMCNC: 31.2 PG
MCV RBC AUTO: 102.8 FL
PLATELET # BLD AUTO: 1522 K/UL
PMV BLD: 9.8 FL
POTASSIUM SERPL-SCNC: 4.9 MMOL/L
POTASSIUM SERPL-SCNC: 5.1 MMOL/L
PROT SERPL-MCNC: 6.7 G/DL
RBC # BLD: 2.15 M/UL
RBC # FLD: 19.2 %
SODIUM SERPL-SCNC: 135 MMOL/L
URATE SERPL-MCNC: 7.6 MG/DL
WBC # FLD AUTO: 5.89 K/UL

## 2020-08-18 RX ORDER — ONDANSETRON 8 MG/1
8 TABLET, FILM COATED ORAL ONCE
Refills: 0 | Status: COMPLETED | OUTPATIENT
Start: 2020-08-18 | End: 2020-08-18

## 2020-08-18 RX ORDER — AZACITIDINE FOR 100 MG/1
123 INJECTION, POWDER, LYOPHILIZED, FOR SOLUTION INTRAVENOUS; SUBCUTANEOUS ONCE
Refills: 0 | Status: COMPLETED | OUTPATIENT
Start: 2020-08-18 | End: 2020-08-18

## 2020-08-18 RX ADMIN — AZACITIDINE FOR 123 MILLIGRAM(S): 100 INJECTION, POWDER, LYOPHILIZED, FOR SOLUTION INTRAVENOUS; SUBCUTANEOUS at 13:05

## 2020-08-18 RX ADMIN — ONDANSETRON 8 MILLIGRAM(S): 8 TABLET, FILM COATED ORAL at 12:45

## 2020-08-19 ENCOUNTER — APPOINTMENT (OUTPATIENT)
Dept: INFUSION THERAPY | Facility: CLINIC | Age: 75
End: 2020-08-19

## 2020-08-19 ENCOUNTER — LABORATORY RESULT (OUTPATIENT)
Age: 75
End: 2020-08-19

## 2020-08-19 RX ORDER — ONDANSETRON 8 MG/1
8 TABLET, FILM COATED ORAL ONCE
Refills: 0 | Status: COMPLETED | OUTPATIENT
Start: 2020-08-19 | End: 2020-08-19

## 2020-08-19 RX ORDER — AZACITIDINE FOR 100 MG/1
123 INJECTION, POWDER, LYOPHILIZED, FOR SOLUTION INTRAVENOUS; SUBCUTANEOUS ONCE
Refills: 0 | Status: COMPLETED | OUTPATIENT
Start: 2020-08-19 | End: 2020-08-19

## 2020-08-19 RX ADMIN — AZACITIDINE FOR 123 MILLIGRAM(S): 100 INJECTION, POWDER, LYOPHILIZED, FOR SOLUTION INTRAVENOUS; SUBCUTANEOUS at 13:43

## 2020-08-19 RX ADMIN — ONDANSETRON 8 MILLIGRAM(S): 8 TABLET, FILM COATED ORAL at 13:02

## 2020-08-20 ENCOUNTER — APPOINTMENT (OUTPATIENT)
Dept: INFUSION THERAPY | Facility: CLINIC | Age: 75
End: 2020-08-20

## 2020-08-20 ENCOUNTER — LABORATORY RESULT (OUTPATIENT)
Age: 75
End: 2020-08-20

## 2020-08-20 VITALS
TEMPERATURE: 98.1 F | SYSTOLIC BLOOD PRESSURE: 111 MMHG | HEART RATE: 56 BPM | DIASTOLIC BLOOD PRESSURE: 55 MMHG | RESPIRATION RATE: 18 BRPM | OXYGEN SATURATION: 99 %

## 2020-08-20 VITALS
HEART RATE: 86 BPM | SYSTOLIC BLOOD PRESSURE: 109 MMHG | RESPIRATION RATE: 16 BRPM | DIASTOLIC BLOOD PRESSURE: 54 MMHG | TEMPERATURE: 98.4 F

## 2020-08-20 VITALS
RESPIRATION RATE: 18 BRPM | DIASTOLIC BLOOD PRESSURE: 56 MMHG | SYSTOLIC BLOOD PRESSURE: 120 MMHG | HEART RATE: 90 BPM | TEMPERATURE: 98 F

## 2020-08-20 RX ORDER — AZACITIDINE FOR 100 MG/1
123 INJECTION, POWDER, LYOPHILIZED, FOR SOLUTION INTRAVENOUS; SUBCUTANEOUS ONCE
Refills: 0 | Status: COMPLETED | OUTPATIENT
Start: 2020-08-20 | End: 2020-08-20

## 2020-08-20 RX ORDER — ONDANSETRON 8 MG/1
8 TABLET, FILM COATED ORAL ONCE
Refills: 0 | Status: COMPLETED | OUTPATIENT
Start: 2020-08-20 | End: 2020-08-20

## 2020-08-20 RX ADMIN — AZACITIDINE FOR 123 MILLIGRAM(S): 100 INJECTION, POWDER, LYOPHILIZED, FOR SOLUTION INTRAVENOUS; SUBCUTANEOUS at 12:42

## 2020-08-20 RX ADMIN — ONDANSETRON 8 MILLIGRAM(S): 8 TABLET, FILM COATED ORAL at 11:22

## 2020-08-21 ENCOUNTER — LABORATORY RESULT (OUTPATIENT)
Age: 75
End: 2020-08-21

## 2020-08-21 ENCOUNTER — APPOINTMENT (OUTPATIENT)
Dept: INFUSION THERAPY | Facility: CLINIC | Age: 75
End: 2020-08-21

## 2020-08-21 LAB
ABO + RH PNL BLD: NORMAL
BLD GP AB SCN SERPL QL: NORMAL
FACT VIII ACT/NOR PPP: 162 %
HCT VFR BLD CALC: 19.7 %
HCT VFR BLD CALC: 21.6 %
HCT VFR BLD CALC: 23.7 %
HGB BLD-MCNC: 6.2 G/DL
HGB BLD-MCNC: 6.8 G/DL
HGB BLD-MCNC: 7.5 G/DL
MCHC RBC-ENTMCNC: 29.1 PG
MCHC RBC-ENTMCNC: 31.5 G/DL
MCHC RBC-ENTMCNC: 31.5 G/DL
MCHC RBC-ENTMCNC: 31.5 PG
MCHC RBC-ENTMCNC: 31.6 G/DL
MCHC RBC-ENTMCNC: 31.8 PG
MCV RBC AUTO: 100 FL
MCV RBC AUTO: 101 FL
MCV RBC AUTO: 91.9 FL
PLATELET # BLD AUTO: 1391 K/UL
PLATELET # BLD AUTO: 1503 K/UL
PLATELET # BLD AUTO: 1529 K/UL
PMV BLD: 9.3 FL
PMV BLD: 9.4 FL
PMV BLD: 9.7 FL
RBC # BLD: 1.95 M/UL
RBC # BLD: 2.16 M/UL
RBC # BLD: 2.58 M/UL
RBC # FLD: 18.9 %
RBC # FLD: 19.2 %
RBC # FLD: 26.6 %
WBC # FLD AUTO: 3.44 K/UL
WBC # FLD AUTO: 4.02 K/UL
WBC # FLD AUTO: 5.32 K/UL

## 2020-08-21 RX ORDER — ONDANSETRON 8 MG/1
8 TABLET, FILM COATED ORAL ONCE
Refills: 0 | Status: COMPLETED | OUTPATIENT
Start: 2020-08-21 | End: 2020-08-21

## 2020-08-21 RX ORDER — AZACITIDINE FOR 100 MG/1
123 INJECTION, POWDER, LYOPHILIZED, FOR SOLUTION INTRAVENOUS; SUBCUTANEOUS ONCE
Refills: 0 | Status: COMPLETED | OUTPATIENT
Start: 2020-08-21 | End: 2020-08-21

## 2020-08-21 RX ADMIN — AZACITIDINE FOR 123 MILLIGRAM(S): 100 INJECTION, POWDER, LYOPHILIZED, FOR SOLUTION INTRAVENOUS; SUBCUTANEOUS at 13:05

## 2020-08-21 RX ADMIN — ONDANSETRON 8 MILLIGRAM(S): 8 TABLET, FILM COATED ORAL at 11:45

## 2020-08-24 ENCOUNTER — APPOINTMENT (OUTPATIENT)
Dept: INFUSION THERAPY | Facility: CLINIC | Age: 75
End: 2020-08-24
Payer: MEDICARE

## 2020-08-24 ENCOUNTER — APPOINTMENT (OUTPATIENT)
Dept: HEMATOLOGY ONCOLOGY | Facility: CLINIC | Age: 75
End: 2020-08-24
Payer: MEDICARE

## 2020-08-24 ENCOUNTER — LABORATORY RESULT (OUTPATIENT)
Age: 75
End: 2020-08-24

## 2020-08-24 VITALS
HEART RATE: 86 BPM | BODY MASS INDEX: 25.58 KG/M2 | WEIGHT: 139 LBS | DIASTOLIC BLOOD PRESSURE: 62 MMHG | HEIGHT: 62 IN | TEMPERATURE: 98 F | RESPIRATION RATE: 18 BRPM | SYSTOLIC BLOOD PRESSURE: 144 MMHG

## 2020-08-24 PROCEDURE — 99213 OFFICE O/P EST LOW 20 MIN: CPT

## 2020-08-24 RX ORDER — PREGABALIN 225 MG/1
1000 CAPSULE ORAL ONCE
Refills: 0 | Status: COMPLETED | OUTPATIENT
Start: 2020-08-24 | End: 2020-08-24

## 2020-08-24 RX ORDER — ERYTHROPOIETIN 10000 [IU]/ML
80000 INJECTION, SOLUTION INTRAVENOUS; SUBCUTANEOUS ONCE
Refills: 0 | Status: COMPLETED | OUTPATIENT
Start: 2020-08-24 | End: 2020-08-24

## 2020-08-24 RX ORDER — AZACITIDINE FOR 100 MG/1
123 INJECTION, POWDER, LYOPHILIZED, FOR SOLUTION INTRAVENOUS; SUBCUTANEOUS ONCE
Refills: 0 | Status: COMPLETED | OUTPATIENT
Start: 2020-08-24 | End: 2020-08-24

## 2020-08-24 RX ORDER — ONDANSETRON 8 MG/1
8 TABLET, FILM COATED ORAL ONCE
Refills: 0 | Status: COMPLETED | OUTPATIENT
Start: 2020-08-24 | End: 2020-08-24

## 2020-08-24 RX ADMIN — ONDANSETRON 8 MILLIGRAM(S): 8 TABLET, FILM COATED ORAL at 12:04

## 2020-08-24 RX ADMIN — PREGABALIN 1000 MICROGRAM(S): 225 CAPSULE ORAL at 12:05

## 2020-08-24 RX ADMIN — AZACITIDINE FOR 123 MILLIGRAM(S): 100 INJECTION, POWDER, LYOPHILIZED, FOR SOLUTION INTRAVENOUS; SUBCUTANEOUS at 12:41

## 2020-08-24 RX ADMIN — ERYTHROPOIETIN 80000 UNIT(S): 10000 INJECTION, SOLUTION INTRAVENOUS; SUBCUTANEOUS at 12:04

## 2020-08-24 NOTE — REVIEW OF SYSTEMS
[Diarrhea] : diarrhea [Negative] : Heme/Lymph [Shortness Of Breath] : no shortness of breath [FreeTextEntry2] : On and off fatigue

## 2020-08-24 NOTE — ASSESSMENT
[FreeTextEntry1] : # Lowrisk myelodysplastic syndrome, previously treated with  Revlimid and Procrit, now on Vidaza which she tolerated well. 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 The plt count usually drops with Vidaza treatment, she was started on Hydrea 8/10/20 due to elevated platelet count. \par  Pt is asymptomatic. \par c/w Cycle 5 Vidaza- D6 today \par c/w [id number] weekly Procrit\par \par # Vitamin  B12 deficiency- on Monthly B12 im- will receive today \par \par # Thrombocytosis; continue ASA 81. \par Started on Hydrea 8/10/20. She is on hydrea 2000 mg daily for a week now and tolerating well. \par Her platelet count is slowly trending down. Will c/w same dose for now. \par CBC every other day and if her platelet count remains elevated or going up will increase hydrea dose \par \par # Hyperkalemia likely due to increased plts; pseudohyperkalemia: Plasma K was normal \par \par Plan:\par On Cycle # 5 of Vidaza sc daily x 7 days Q 28 days.\par SE discussed.\par She will c/w same dose hydrea with frequent CBC checks and increase dose of hydrea if platelet count remains elevated. Pt advised of SE of hydrea, including added myelosuppression from it.\par Patient has reached out to Dr Ferrell and awaiting appointment date\par \par Continue with Procrit 80,000 units weekly.\par Continue B12 injections monthly. \par \par \par Will monitor pt with  frequent CBCs.\par \par The antineoplastic therapy dose was adjusted according to pt's labs and anticipated tolerance.\par The high risk of complications and complexity associated with antineoplastic therapy administration has been explained to the pt and family.\par Treatment will be administered attending's supervision.\par \par \par RTC in 1 week to f/u Dr Wade\par \par Patient was seen and examined and discussed with Dr Ghosh who agrees with the plan. \par \par

## 2020-08-24 NOTE — HISTORY OF PRESENT ILLNESS
[de-identified] : She missed on appointment for procrit last week.\par She starts her next cycle on 6/25/18\par C/o back pain radiating down her left which occurred when she bent to  something.\par No change in diarrhea.\par \par 7/31/18:\par Doing well. On Revlimid for more than 2yrs, 5mg 2weeks on 1 week off. She will be starting week on tonight. \par C/o diarrhea. On Imodium 2 pills AM and 2 pills PM. Doesn't help much with diarrhea. \par C/o nausea, abd pain. \par Colonoscopy in 2016 was normal. \par Did not have blood transfusion for over 2 years. \par On procrit [id number] weekly. Missed last week on 7/24/18 as she was out of town. She has been non compliant with weekly Procrit.\par Mammogram in 2017 was normal. \par \par 9/11/18\par pt is here for follow up.\par She feels the lomotil helps with the diarrhea.\par She was away for a few weeks and missed her procrit injections.\par No fever, abdominal  discomfort has been less since since use of lomotil.\par She started a new cycle 2 days ago.\par \par 10/2/18:\par She will start Revlimid tonight (week on). 2 weeks on, 1 week off. \par On ASA 81mg. \par Denies fever, nausea, vomiting, chest pain, SOB, abdominal pain, and bladder problems.\par C/o diarrhea. \par S/p 2 units PRBC transfusion on 9/25/18. \par On Procrit 20255 units weekly. Not compliant every week. \par C/o intermittent dizziness. \par \par 10/31/18\par Pt is here for follow up.\par C/O significant fatigue.\par Continues to have diarrhea, takes imodium and lomotil as needed.\par C/o dry cough, no fever.\par Cough started a month ago. It is worse in the mornings however over last week it has been constant all day.\par No chest pain.\par She was found to have low B12 levels and was started on B12 injections.\par \par 11/27/18:\par Doing well. Hospitalized for 3 days for cellulitis/abcess of the back s/p drainage and on Abx currently. Developed 3 days after Mg infusion as per pt. \par Denies nausea, vomiting, chest pain, SOB, abdominal pain, bowel and bladder problems.\par This is the week on Revlimid (week 1).\par S/p 1 unit PRBC transfusion in the hospital. \par On Procrit weekly \par \par 12/27/18:\par Doing well. No major complaints.\par Denies fever, nausea, vomiting, chest pain, SOB, abdominal pain, and bladder problems.\par On Revlimid 5 mg 2 weeks on 1 week off. This is the week off. She will start the week on sunday (12/30/18).\par Due for Procrit 66061 units today. \par Still has diarrhea. 4-5bm/day.\par On monthly B12 injections. \par \par 1/30/19:\par Patient here for follow up for MDS.  She is taking Revlimid 5 mg, 2 weeks on, 1 week off.  She will complete this current cycle on Saturday.  She has no new complaints today.  Patient denies cough, shortness of breath, denies fever, denies bone pain.\par \par 03/04/2019\par Patient is here for a follow up visit. She complaints of severe pain in her left shoulder and has had abscess drained 2 weeks ago. However the pain is worse and she has purulent discharge on examination. She also has Nasal sores that are painful. Culture from 11/2018 showed MRSA.  Denies Fever. \par She also complaints of swelling in her right leg. Not tender and not erythematous and negative Gong;s sign. \par Her diarrhea with Revlimid is ongoing and Imodium and Lomotil helps but not everyday. \par She is on 60,000 units of procrit weekly and Revlimid 5 mg 2 weeks on 1 week off. \par \par 4/23/19\par Pt is here for follow up.\par She feels well.\par The nasal sores have improved with bactroban\par The abscess on left shoulder has resolved.\par However she has a new one on the middle of her back.\par No fever.\par Pt has been on procrit 24136 units weekly, however she missed a dose in between.\par \par 5/8/19\par pt is here for follow up.\par no new complaints.\par feels well.\par Her skin abscess has resolved.\par she has been unable to go to ID, as she could not get an appt.\par No bleeding.\par She is compliant with revlimid.\par \par 6/6/19\par Pt is here for follow up.\par no new complaints \par Feels well.\par Is on week 2 of her Revlimid cycle. \par No transfusions since last visit\par \par 7/17/19\par Pt is here for follow up.\par C/O fatigue.\par Was away for a few days two weeks ago, but missed treatment with procrit for 2 weeks.\par Is complaint with Revlimid 2 weeks on 1 week off.\par Diarrhea is a little improved.\par \par \par 8/14/19\par Patient here for follow up visit, feeling well.  Although she is complaining of some pain at the left scapula area recently.  Patient denies cough, shortness of breath, denies fever, denies other bone pain.  She is off Revlimid  this week, due to restart on Monday.  She is scheduled for Procrit and Vitamin B12 injection today.  She plans to leave the country tomorrow to visit her mother who is ill.  She will return in about 10 days.\par \par 9/11/19\par Pt is here for follow up.\par She was away for 3 weeks, out of which she took procrit for 2 weeks in [place].\par She missed last week's dose.\par She had CBCs in [place] which showed Hgb of 8.9\par She feels well.\par She still getting diarrhea.\par She has been using lomotil with very minimal relief.\par She started a new cycle of Revlimid 4 days ago.\par \par 10/3/19\par Patient here for follow up visit, feeling fairly well.   Patient denies cough, shortness of breath, denies fever, denies other bone pain.  She remains on Revlimid.  She is scheduled for Procrit and Vitamin B12 injection today.\par \par 10/24/19\par Pt is here for follow up.\par Feels well.\par No SOB, fatigue.\par Compliant with procrit and Revl;imid.\par Remains on ASa.\par Diarrhea is unchanged.\par Pt takes imodium as needed.\par \par 11/14/19\par Pt is here for follow up.\par No new complaints.\par Starts a new cycle of Revlimid today.\par Diarrhea is same as before, no rectal bleeding.\par No fever.\par \par 12/5/19\par Pt is here for follow up.\par No new complaints.\par Denies feeling weaker than usual.\par No fever, SOB.\par No change in frequency of diarrhea.\par No pain.\par Will start next cycle of Revlimid in 3 days.\par \par \par !/16/20\par Pt was recently DCed from [facility] 3 days ago after being treated for pneumonia and MARCO.\par She is on po Levaquin.\par She restarted Revlmid 3 days ago.\par She is feeling better now. No fever.\par No SOB, Chest pain and back pain has resolved.\par Pt has a cough, no expectoration.\par She also recd a unit of PRBCs last week in the hospital\par \par \par 1/30/20\par Patient here for follow up visit, feeling well.  She has no new complaints. Cough is almost gone completely.  Patient denies shortness of breath, denies fever, denies bone pain.  Her appetite is ok, weight is stable.  She is due to restart Revlimid on Monday.\par \par 02/20/20\par Pt is here for follow up, feeling well.\par Offers no new complaints. Denies SOB, fever, chills, weight loss, CP, cough. \par Currently off week of Revlimid 5 mg. Restarts on Monday.\par Has procrit 80,000 units today. Next b 12 injection due in 2 weeks \par Did not get chest Xray\par CBC reviewed WBC 3.1, h/h 8.3/25%, plt 386, neutrophils 1.29\par \par 3/12/20\par Pt is here for follow up.\par She took Revlimid for 2 weeks and then has been off for one week although she was advised to take it daily without break.\par No SOB, Cough, fever.\par Has mild fatigue.\par \par 04/02/2020\par SIMONA KUHN a 75 year F is here today for follow up visit of MDS.\par Denies fever, chills, cough,night sweats, weight loss. \par Denies bleeding or bruising.\par Pt receives procrit 80,000 units weekly and B12 IM monthly. \par Continued Revlimid 5 mg daily x 3 weeks, has 1 dose left tonight. \par CBC reviewed: WBC 3.2, H/H 8.1/25.2, neutrophils 1.6, PLT 72\par \par 4/20/2020: The patient is here for follow up . She has no complaints of fever, chills, dizziness , chest pain and shortness of breath . She is still with diarrhea , up to 10 watery BMs per day, responds poorly to imodium and lomotil. She is on Revlimid 5 mg daily , took last dose yesterday night. Her last Procrit was on April 13. \par \par 5/26/20\par Pt is here for follow up.\par She is feeling well. Denies SOB, chest pain, fever.\par Continues to have diarrhea although she is off of Revlmid.\par Thinks it may due to diabetic meds.\par Wants to discuss BM bx results\par \par 6/22/20\par Pt is here for follow up.\par Feels well. Denies any fever, N, V, D\par Continues to have diarrhea, she will talk to her PCP about changing metformin for DM.\par Has been needing PRBCs 1 unit each month\par \par 7/20/20\par Pt is here for follow up.\par No new complaints. Has been feeling well.\par No SOB, CP. \par No N, V, D.\par She has recd 2 units of PRBCs since May 20.\par \par 8/17/20\par Pt is here for a follow up visit.\par She is feeling well.\par No new symptoms. No N, V, D.\par No bruising or bleeding. She continues to need PRBCs every 2-3 weeks.\par \par 8/24/20- Patient is here for follow up. She is on Hydrea 2000mg daily and tolerating well with no issues. No c/o headaches, vision changes/ TIA like symptoms. No bleeding/ bruising and no recent fever/chills . She is due for C5D6 of Vidaza today \par  [de-identified] : \par

## 2020-08-25 ENCOUNTER — LABORATORY RESULT (OUTPATIENT)
Age: 75
End: 2020-08-25

## 2020-08-25 ENCOUNTER — APPOINTMENT (OUTPATIENT)
Dept: INFUSION THERAPY | Facility: CLINIC | Age: 75
End: 2020-08-25

## 2020-08-25 RX ORDER — ONDANSETRON 8 MG/1
8 TABLET, FILM COATED ORAL ONCE
Refills: 0 | Status: COMPLETED | OUTPATIENT
Start: 2020-08-25 | End: 2020-08-25

## 2020-08-25 RX ORDER — AZACITIDINE FOR 100 MG/1
123 INJECTION, POWDER, LYOPHILIZED, FOR SOLUTION INTRAVENOUS; SUBCUTANEOUS ONCE
Refills: 0 | Status: COMPLETED | OUTPATIENT
Start: 2020-08-25 | End: 2020-08-25

## 2020-08-25 RX ADMIN — ONDANSETRON 8 MILLIGRAM(S): 8 TABLET, FILM COATED ORAL at 11:25

## 2020-08-25 RX ADMIN — AZACITIDINE FOR 123 MILLIGRAM(S): 100 INJECTION, POWDER, LYOPHILIZED, FOR SOLUTION INTRAVENOUS; SUBCUTANEOUS at 12:24

## 2020-08-26 LAB
HCT VFR BLD CALC: 22.3 %
HCT VFR BLD CALC: 22.5 %
HGB BLD-MCNC: 7.2 G/DL
HGB BLD-MCNC: 7.3 G/DL
MCHC RBC-ENTMCNC: 29.3 PG
MCHC RBC-ENTMCNC: 29.9 PG
MCHC RBC-ENTMCNC: 32.3 G/DL
MCHC RBC-ENTMCNC: 32.4 G/DL
MCV RBC AUTO: 90.4 FL
MCV RBC AUTO: 92.5 FL
PLATELET # BLD AUTO: 1432 K/UL
PLATELET # BLD AUTO: 1527 K/UL
PMV BLD: 9 FL
PMV BLD: 9.9 FL
RBC # BLD: 2.41 M/UL
RBC # BLD: 2.49 M/UL
RBC # FLD: 24.1 %
RBC # FLD: 24.6 %
WBC # FLD AUTO: 3.07 K/UL
WBC # FLD AUTO: 3.75 K/UL

## 2020-08-27 ENCOUNTER — APPOINTMENT (OUTPATIENT)
Dept: HEMATOLOGY ONCOLOGY | Facility: CLINIC | Age: 75
End: 2020-08-27

## 2020-08-27 ENCOUNTER — LABORATORY RESULT (OUTPATIENT)
Age: 75
End: 2020-08-27

## 2020-08-27 LAB
HCT VFR BLD CALC: 23.6 %
HGB BLD-MCNC: 7.7 G/DL
MCHC RBC-ENTMCNC: 30 PG
MCHC RBC-ENTMCNC: 32.6 G/DL
MCV RBC AUTO: 91.8 FL
PLATELET # BLD AUTO: 1332 K/UL
PMV BLD: 10 FL
RBC # BLD: 2.57 M/UL
RBC # FLD: 23.9 %
WBC # FLD AUTO: 3.26 K/UL

## 2020-08-31 ENCOUNTER — LABORATORY RESULT (OUTPATIENT)
Age: 75
End: 2020-08-31

## 2020-08-31 ENCOUNTER — APPOINTMENT (OUTPATIENT)
Dept: HEMATOLOGY ONCOLOGY | Facility: CLINIC | Age: 75
End: 2020-08-31
Payer: MEDICARE

## 2020-08-31 ENCOUNTER — APPOINTMENT (OUTPATIENT)
Dept: INFUSION THERAPY | Facility: CLINIC | Age: 75
End: 2020-08-31
Payer: MEDICARE

## 2020-08-31 VITALS
HEART RATE: 93 BPM | DIASTOLIC BLOOD PRESSURE: 64 MMHG | WEIGHT: 139 LBS | TEMPERATURE: 97.7 F | HEIGHT: 62 IN | BODY MASS INDEX: 25.58 KG/M2 | SYSTOLIC BLOOD PRESSURE: 134 MMHG

## 2020-08-31 LAB
HCT VFR BLD CALC: 20.6 %
HGB BLD-MCNC: 6.7 G/DL
MCHC RBC-ENTMCNC: 29.4 PG
MCHC RBC-ENTMCNC: 32.5 G/DL
MCV RBC AUTO: 90.4 FL
PLATELET # BLD AUTO: 767 K/UL
PMV BLD: 10 FL
RBC # BLD: 2.28 M/UL
RBC # FLD: 22.7 %
WBC # FLD AUTO: 2.55 K/UL

## 2020-08-31 PROCEDURE — 99214 OFFICE O/P EST MOD 30 MIN: CPT

## 2020-08-31 RX ORDER — ERYTHROPOIETIN 10000 [IU]/ML
80000 INJECTION, SOLUTION INTRAVENOUS; SUBCUTANEOUS ONCE
Refills: 0 | Status: COMPLETED | OUTPATIENT
Start: 2020-08-31 | End: 2020-08-31

## 2020-08-31 RX ADMIN — ERYTHROPOIETIN 80000 UNIT(S): 10000 INJECTION, SOLUTION INTRAVENOUS; SUBCUTANEOUS at 16:08

## 2020-09-01 LAB
ABO + RH PNL BLD: NORMAL
BLD GP AB SCN SERPL QL: NORMAL

## 2020-09-02 ENCOUNTER — APPOINTMENT (OUTPATIENT)
Dept: INFUSION THERAPY | Facility: CLINIC | Age: 75
End: 2020-09-02

## 2020-09-04 ENCOUNTER — APPOINTMENT (OUTPATIENT)
Dept: HEMATOLOGY ONCOLOGY | Facility: CLINIC | Age: 75
End: 2020-09-04

## 2020-09-04 ENCOUNTER — LABORATORY RESULT (OUTPATIENT)
Age: 75
End: 2020-09-04

## 2020-09-04 LAB
HCT VFR BLD CALC: 21.1 %
HGB BLD-MCNC: 6.8 G/DL
MCHC RBC-ENTMCNC: 28.6 PG
MCHC RBC-ENTMCNC: 32.2 G/DL
MCV RBC AUTO: 88.7 FL
PLATELET # BLD AUTO: 294 K/UL
PMV BLD: 9.8 FL
RBC # BLD: 2.38 M/UL
RBC # FLD: 21.3 %
WBC # FLD AUTO: 0.95 K/UL

## 2020-09-04 NOTE — HISTORY OF PRESENT ILLNESS
[de-identified] : She missed on appointment for procrit last week.\par She starts her next cycle on 6/25/18\par C/o back pain radiating down her left which occurred when she bent to  something.\par No change in diarrhea.\par \par 7/31/18:\par Doing well. On Revlimid for more than 2yrs, 5mg 2weeks on 1 week off. She will be starting week on tonight. \par C/o diarrhea. On Imodium 2 pills AM and 2 pills PM. Doesn't help much with diarrhea. \par C/o nausea, abd pain. \par Colonoscopy in 2016 was normal. \par Did not have blood transfusion for over 2 years. \par On procrit [id number] weekly. Missed last week on 7/24/18 as she was out of town. She has been non compliant with weekly Procrit.\par Mammogram in 2017 was normal. \par \par 9/11/18\par pt is here for follow up.\par She feels the lomotil helps with the diarrhea.\par She was away for a few weeks and missed her procrit injections.\par No fever, abdominal  discomfort has been less since since use of lomotil.\par She started a new cycle 2 days ago.\par \par 10/2/18:\par She will start Revlimid tonight (week on). 2 weeks on, 1 week off. \par On ASA 81mg. \par Denies fever, nausea, vomiting, chest pain, SOB, abdominal pain, and bladder problems.\par C/o diarrhea. \par S/p 2 units PRBC transfusion on 9/25/18. \par On Procrit 35231 units weekly. Not compliant every week. \par C/o intermittent dizziness. \par \par 10/31/18\par Pt is here for follow up.\par C/O significant fatigue.\par Continues to have diarrhea, takes imodium and lomotil as needed.\par C/o dry cough, no fever.\par Cough started a month ago. It is worse in the mornings however over last week it has been constant all day.\par No chest pain.\par She was found to have low B12 levels and was started on B12 injections.\par \par 11/27/18:\par Doing well. Hospitalized for 3 days for cellulitis/abcess of the back s/p drainage and on Abx currently. Developed 3 days after Mg infusion as per pt. \par Denies nausea, vomiting, chest pain, SOB, abdominal pain, bowel and bladder problems.\par This is the week on Revlimid (week 1).\par S/p 1 unit PRBC transfusion in the hospital. \par On Procrit weekly \par \par 12/27/18:\par Doing well. No major complaints.\par Denies fever, nausea, vomiting, chest pain, SOB, abdominal pain, and bladder problems.\par On Revlimid 5 mg 2 weeks on 1 week off. This is the week off. She will start the week on sunday (12/30/18).\par Due for Procrit 05758 units today. \par Still has diarrhea. 4-5bm/day.\par On monthly B12 injections. \par \par 1/30/19:\par Patient here for follow up for MDS.  She is taking Revlimid 5 mg, 2 weeks on, 1 week off.  She will complete this current cycle on Saturday.  She has no new complaints today.  Patient denies cough, shortness of breath, denies fever, denies bone pain.\par \par 03/04/2019\par Patient is here for a follow up visit. She complaints of severe pain in her left shoulder and has had abscess drained 2 weeks ago. However the pain is worse and she has purulent discharge on examination. She also has Nasal sores that are painful. Culture from 11/2018 showed MRSA.  Denies Fever. \par She also complaints of swelling in her right leg. Not tender and not erythematous and negative Gong;s sign. \par Her diarrhea with Revlimid is ongoing and Imodium and Lomotil helps but not everyday. \par She is on 60,000 units of procrit weekly and Revlimid 5 mg 2 weeks on 1 week off. \par \par 4/23/19\par Pt is here for follow up.\par She feels well.\par The nasal sores have improved with bactroban\par The abscess on left shoulder has resolved.\par However she has a new one on the middle of her back.\par No fever.\par Pt has been on procrit 30218 units weekly, however she missed a dose in between.\par \par 5/8/19\par pt is here for follow up.\par no new complaints.\par feels well.\par Her skin abscess has resolved.\par she has been unable to go to ID, as she could not get an appt.\par No bleeding.\par She is compliant with revlimid.\par \par 6/6/19\par Pt is here for follow up.\par no new complaints \par Feels well.\par Is on week 2 of her Revlimid cycle. \par No transfusions since last visit\par \par 7/17/19\par Pt is here for follow up.\par C/O fatigue.\par Was away for a few days two weeks ago, but missed treatment with procrit for 2 weeks.\par Is complaint with Revlimid 2 weeks on 1 week off.\par Diarrhea is a little improved.\par \par \par 8/14/19\par Patient here for follow up visit, feeling well.  Although she is complaining of some pain at the left scapula area recently.  Patient denies cough, shortness of breath, denies fever, denies other bone pain.  She is off Revlimid  this week, due to restart on Monday.  She is scheduled for Procrit and Vitamin B12 injection today.  She plans to leave the country tomorrow to visit her mother who is ill.  She will return in about 10 days.\par \par 9/11/19\par Pt is here for follow up.\par She was away for 3 weeks, out of which she took procrit for 2 weeks in [place].\par She missed last week's dose.\par She had CBCs in [place] which showed Hgb of 8.9\par She feels well.\par She still getting diarrhea.\par She has been using lomotil with very minimal relief.\par She started a new cycle of Revlimid 4 days ago.\par \par 10/3/19\par Patient here for follow up visit, feeling fairly well.   Patient denies cough, shortness of breath, denies fever, denies other bone pain.  She remains on Revlimid.  She is scheduled for Procrit and Vitamin B12 injection today.\par \par 10/24/19\par Pt is here for follow up.\par Feels well.\par No SOB, fatigue.\par Compliant with procrit and Revl;imid.\par Remains on ASa.\par Diarrhea is unchanged.\par Pt takes imodium as needed.\par \par 11/14/19\par Pt is here for follow up.\par No new complaints.\par Starts a new cycle of Revlimid today.\par Diarrhea is same as before, no rectal bleeding.\par No fever.\par \par 12/5/19\par Pt is here for follow up.\par No new complaints.\par Denies feeling weaker than usual.\par No fever, SOB.\par No change in frequency of diarrhea.\par No pain.\par Will start next cycle of Revlimid in 3 days.\par \par \par !/16/20\par Pt was recently DCed from [facility] 3 days ago after being treated for pneumonia and MARCO.\par She is on po Levaquin.\par She restarted Revlmid 3 days ago.\par She is feeling better now. No fever.\par No SOB, Chest pain and back pain has resolved.\par Pt has a cough, no expectoration.\par She also recd a unit of PRBCs last week in the hospital\par \par \par 1/30/20\par Patient here for follow up visit, feeling well.  She has no new complaints. Cough is almost gone completely.  Patient denies shortness of breath, denies fever, denies bone pain.  Her appetite is ok, weight is stable.  She is due to restart Revlimid on Monday.\par \par 02/20/20\par Pt is here for follow up, feeling well.\par Offers no new complaints. Denies SOB, fever, chills, weight loss, CP, cough. \par Currently off week of Revlimid 5 mg. Restarts on Monday.\par Has procrit 80,000 units today. Next b 12 injection due in 2 weeks \par Did not get chest Xray\par CBC reviewed WBC 3.1, h/h 8.3/25%, plt 386, neutrophils 1.29\par \par 3/12/20\par Pt is here for follow up.\par She took Revlimid for 2 weeks and then has been off for one week although she was advised to take it daily without break.\par No SOB, Cough, fever.\par Has mild fatigue.\par \par 04/02/2020\par SIMONA KUHN a 75 year F is here today for follow up visit of MDS.\par Denies fever, chills, cough,night sweats, weight loss. \par Denies bleeding or bruising.\par Pt receives procrit 80,000 units weekly and B12 IM monthly. \par Continued Revlimid 5 mg daily x 3 weeks, has 1 dose left tonight. \par CBC reviewed: WBC 3.2, H/H 8.1/25.2, neutrophils 1.6, PLT 72\par \par 4/20/2020: The patient is here for follow up . She has no complaints of fever, chills, dizziness , chest pain and shortness of breath . She is still with diarrhea , up to 10 watery BMs per day, responds poorly to imodium and lomotil. She is on Revlimid 5 mg daily , took last dose yesterday night. Her last Procrit was on April 13. \par \par 5/26/20\par Pt is here for follow up.\par She is feeling well. Denies SOB, chest pain, fever.\par Continues to have diarrhea although she is off of Revlmid.\par Thinks it may due to diabetic meds.\par Wants to discuss BM bx results\par \par 6/22/20\par Pt is here for follow up.\par Feels well. Denies any fever, N, V, D\par Continues to have diarrhea, she will talk to her PCP about changing metformin for DM.\par Has been needing PRBCs 1 unit each month\par \par 7/20/20\par Pt is here for follow up.\par No new complaints. Has been feeling well.\par No SOB, CP. \par No N, V, D.\par She has recd 2 units of PRBCs since May 20.\par \par 8/17/20\par Pt is here for a follow up visit.\par She is feeling well.\par No new symptoms. No N, V, D.\par No bruising or bleeding. She continues to need PRBCs every 2-3 weeks.\par \par 8/31/20\par Pt is here for follow up. She is feeling well. No N, V, D.\par She is tolerating the hydrea well. No SOB, CP\par No bruising ior bleeding [de-identified] : \par

## 2020-09-04 NOTE — ASSESSMENT
[FreeTextEntry1] : # Lowrisk myelodysplastic syndrome, previously treated with  Revlimid and Procrit, now s/p 4 cycles of Vidaza which she tolerated well. 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 The plt count usually drops with Vidaza treatment.\par However her plt count has been running high, warranting further cytoreduction. Pt is asymptomatic. \par \par # Vitamin  B12 deficiency.\par \par # Thrombocytosis; continue ASA. Continue Hydrea 1500mg daily. CBC three times /week, may need to lower hydrea based on her counts.\par \par # Hyperkalemia likely due to increased plts; pseudohyperkalemia: check plasma K\par \par Plan:\par Pt is s/p  Cycle # 4 of Vidaza sc daily x 7 days Q 28 days.\par SE discussed.\par Hydrea added. Pt advised of SE of hydrea, including added myelosuppression from it.\par Advised pt to follow up with Dr Ferrell for second opinion.\par \par Continue with Procrit 80,000 units weekly.\par Pt will receive 1 unit PRBC\par Continue B12 injections monthly. \par \par \par Will monitor pt with  frequent CBCs.\par \par The antineoplastic therapy dose was adjusted according to pt's labs and anticipated tolerance.\par The high risk of complications and complexity associated with antineoplastic therapy administration has been explained to the pt and family.\par Treatment will be administered under my supervision.\par \par \par RTC in 1 week, \par

## 2020-09-08 ENCOUNTER — LABORATORY RESULT (OUTPATIENT)
Age: 75
End: 2020-09-08

## 2020-09-08 ENCOUNTER — APPOINTMENT (OUTPATIENT)
Dept: HEMATOLOGY ONCOLOGY | Facility: CLINIC | Age: 75
End: 2020-09-08
Payer: MEDICARE

## 2020-09-08 ENCOUNTER — APPOINTMENT (OUTPATIENT)
Dept: INFUSION THERAPY | Facility: CLINIC | Age: 75
End: 2020-09-08
Payer: MEDICARE

## 2020-09-08 VITALS
WEIGHT: 140 LBS | HEART RATE: 85 BPM | SYSTOLIC BLOOD PRESSURE: 131 MMHG | BODY MASS INDEX: 25.76 KG/M2 | DIASTOLIC BLOOD PRESSURE: 68 MMHG | HEIGHT: 62 IN | TEMPERATURE: 98 F

## 2020-09-08 LAB
HCT VFR BLD CALC: 20.4 %
HGB BLD-MCNC: 6.7 G/DL
MCHC RBC-ENTMCNC: 28.6 PG
MCHC RBC-ENTMCNC: 32.8 G/DL
MCV RBC AUTO: 87.2 FL
PLATELET # BLD AUTO: 120 K/UL
PMV BLD: 11.9 FL
RBC # BLD: 2.34 M/UL
RBC # FLD: 20.1 %
WBC # FLD AUTO: 0.86 K/UL

## 2020-09-08 PROCEDURE — 99215 OFFICE O/P EST HI 40 MIN: CPT

## 2020-09-08 RX ORDER — ERYTHROPOIETIN 10000 [IU]/ML
80000 INJECTION, SOLUTION INTRAVENOUS; SUBCUTANEOUS ONCE
Refills: 0 | Status: COMPLETED | OUTPATIENT
Start: 2020-09-08 | End: 2020-09-08

## 2020-09-08 RX ADMIN — ERYTHROPOIETIN 80000 UNIT(S): 10000 INJECTION, SOLUTION INTRAVENOUS; SUBCUTANEOUS at 15:05

## 2020-09-09 LAB — VWF MULTIMERS PPP IA-ACNC: NORMAL

## 2020-09-10 ENCOUNTER — APPOINTMENT (OUTPATIENT)
Dept: HEMATOLOGY ONCOLOGY | Facility: CLINIC | Age: 75
End: 2020-09-10

## 2020-09-10 ENCOUNTER — LABORATORY RESULT (OUTPATIENT)
Age: 75
End: 2020-09-10

## 2020-09-10 LAB
HCT VFR BLD CALC: 19.7 %
HGB BLD-MCNC: 6.3 G/DL
MCHC RBC-ENTMCNC: 28.5 PG
MCHC RBC-ENTMCNC: 32 G/DL
MCV RBC AUTO: 89.1 FL
PLATELET # BLD AUTO: 143 K/UL
PMV BLD: 12.5 FL
RBC # BLD: 2.21 M/UL
RBC # FLD: 20.2 %
WBC # FLD AUTO: 0.81 K/UL

## 2020-09-11 ENCOUNTER — APPOINTMENT (OUTPATIENT)
Dept: INFUSION THERAPY | Facility: CLINIC | Age: 75
End: 2020-09-11

## 2020-09-11 LAB
ABO + RH PNL BLD: NORMAL
BLD GP AB SCN SERPL QL: NORMAL

## 2020-09-11 NOTE — HISTORY OF PRESENT ILLNESS
[de-identified] : She missed on appointment for procrit last week.\par She starts her next cycle on 6/25/18\par C/o back pain radiating down her left which occurred when she bent to  something.\par No change in diarrhea.\par \par 7/31/18:\par Doing well. On Revlimid for more than 2yrs, 5mg 2weeks on 1 week off. She will be starting week on tonight. \par C/o diarrhea. On Imodium 2 pills AM and 2 pills PM. Doesn't help much with diarrhea. \par C/o nausea, abd pain. \par Colonoscopy in 2016 was normal. \par Did not have blood transfusion for over 2 years. \par On procrit [id number] weekly. Missed last week on 7/24/18 as she was out of town. She has been non compliant with weekly Procrit.\par Mammogram in 2017 was normal. \par \par 9/11/18\par pt is here for follow up.\par She feels the lomotil helps with the diarrhea.\par She was away for a few weeks and missed her procrit injections.\par No fever, abdominal  discomfort has been less since since use of lomotil.\par She started a new cycle 2 days ago.\par \par 10/2/18:\par She will start Revlimid tonight (week on). 2 weeks on, 1 week off. \par On ASA 81mg. \par Denies fever, nausea, vomiting, chest pain, SOB, abdominal pain, and bladder problems.\par C/o diarrhea. \par S/p 2 units PRBC transfusion on 9/25/18. \par On Procrit 40531 units weekly. Not compliant every week. \par C/o intermittent dizziness. \par \par 10/31/18\par Pt is here for follow up.\par C/O significant fatigue.\par Continues to have diarrhea, takes imodium and lomotil as needed.\par C/o dry cough, no fever.\par Cough started a month ago. It is worse in the mornings however over last week it has been constant all day.\par No chest pain.\par She was found to have low B12 levels and was started on B12 injections.\par \par 11/27/18:\par Doing well. Hospitalized for 3 days for cellulitis/abcess of the back s/p drainage and on Abx currently. Developed 3 days after Mg infusion as per pt. \par Denies nausea, vomiting, chest pain, SOB, abdominal pain, bowel and bladder problems.\par This is the week on Revlimid (week 1).\par S/p 1 unit PRBC transfusion in the hospital. \par On Procrit weekly \par \par 12/27/18:\par Doing well. No major complaints.\par Denies fever, nausea, vomiting, chest pain, SOB, abdominal pain, and bladder problems.\par On Revlimid 5 mg 2 weeks on 1 week off. This is the week off. She will start the week on sunday (12/30/18).\par Due for Procrit 49573 units today. \par Still has diarrhea. 4-5bm/day.\par On monthly B12 injections. \par \par 1/30/19:\par Patient here for follow up for MDS.  She is taking Revlimid 5 mg, 2 weeks on, 1 week off.  She will complete this current cycle on Saturday.  She has no new complaints today.  Patient denies cough, shortness of breath, denies fever, denies bone pain.\par \par 03/04/2019\par Patient is here for a follow up visit. She complaints of severe pain in her left shoulder and has had abscess drained 2 weeks ago. However the pain is worse and she has purulent discharge on examination. She also has Nasal sores that are painful. Culture from 11/2018 showed MRSA.  Denies Fever. \par She also complaints of swelling in her right leg. Not tender and not erythematous and negative Gong;s sign. \par Her diarrhea with Revlimid is ongoing and Imodium and Lomotil helps but not everyday. \par She is on 60,000 units of procrit weekly and Revlimid 5 mg 2 weeks on 1 week off. \par \par 4/23/19\par Pt is here for follow up.\par She feels well.\par The nasal sores have improved with bactroban\par The abscess on left shoulder has resolved.\par However she has a new one on the middle of her back.\par No fever.\par Pt has been on procrit 19971 units weekly, however she missed a dose in between.\par \par 5/8/19\par pt is here for follow up.\par no new complaints.\par feels well.\par Her skin abscess has resolved.\par she has been unable to go to ID, as she could not get an appt.\par No bleeding.\par She is compliant with revlimid.\par \par 6/6/19\par Pt is here for follow up.\par no new complaints \par Feels well.\par Is on week 2 of her Revlimid cycle. \par No transfusions since last visit\par \par 7/17/19\par Pt is here for follow up.\par C/O fatigue.\par Was away for a few days two weeks ago, but missed treatment with procrit for 2 weeks.\par Is complaint with Revlimid 2 weeks on 1 week off.\par Diarrhea is a little improved.\par \par \par 8/14/19\par Patient here for follow up visit, feeling well.  Although she is complaining of some pain at the left scapula area recently.  Patient denies cough, shortness of breath, denies fever, denies other bone pain.  She is off Revlimid  this week, due to restart on Monday.  She is scheduled for Procrit and Vitamin B12 injection today.  She plans to leave the country tomorrow to visit her mother who is ill.  She will return in about 10 days.\par \par 9/11/19\par Pt is here for follow up.\par She was away for 3 weeks, out of which she took procrit for 2 weeks in [place].\par She missed last week's dose.\par She had CBCs in [place] which showed Hgb of 8.9\par She feels well.\par She still getting diarrhea.\par She has been using lomotil with very minimal relief.\par She started a new cycle of Revlimid 4 days ago.\par \par 10/3/19\par Patient here for follow up visit, feeling fairly well.   Patient denies cough, shortness of breath, denies fever, denies other bone pain.  She remains on Revlimid.  She is scheduled for Procrit and Vitamin B12 injection today.\par \par 10/24/19\par Pt is here for follow up.\par Feels well.\par No SOB, fatigue.\par Compliant with procrit and Revl;imid.\par Remains on ASa.\par Diarrhea is unchanged.\par Pt takes imodium as needed.\par \par 11/14/19\par Pt is here for follow up.\par No new complaints.\par Starts a new cycle of Revlimid today.\par Diarrhea is same as before, no rectal bleeding.\par No fever.\par \par 12/5/19\par Pt is here for follow up.\par No new complaints.\par Denies feeling weaker than usual.\par No fever, SOB.\par No change in frequency of diarrhea.\par No pain.\par Will start next cycle of Revlimid in 3 days.\par \par \par !/16/20\par Pt was recently DCed from [facility] 3 days ago after being treated for pneumonia and MARCO.\par She is on po Levaquin.\par She restarted Revlmid 3 days ago.\par She is feeling better now. No fever.\par No SOB, Chest pain and back pain has resolved.\par Pt has a cough, no expectoration.\par She also recd a unit of PRBCs last week in the hospital\par \par \par 1/30/20\par Patient here for follow up visit, feeling well.  She has no new complaints. Cough is almost gone completely.  Patient denies shortness of breath, denies fever, denies bone pain.  Her appetite is ok, weight is stable.  She is due to restart Revlimid on Monday.\par \par 02/20/20\par Pt is here for follow up, feeling well.\par Offers no new complaints. Denies SOB, fever, chills, weight loss, CP, cough. \par Currently off week of Revlimid 5 mg. Restarts on Monday.\par Has procrit 80,000 units today. Next b 12 injection due in 2 weeks \par Did not get chest Xray\par CBC reviewed WBC 3.1, h/h 8.3/25%, plt 386, neutrophils 1.29\par \par 3/12/20\par Pt is here for follow up.\par She took Revlimid for 2 weeks and then has been off for one week although she was advised to take it daily without break.\par No SOB, Cough, fever.\par Has mild fatigue.\par \par 04/02/2020\par SIMONA KUHN a 75 year F is here today for follow up visit of MDS.\par Denies fever, chills, cough,night sweats, weight loss. \par Denies bleeding or bruising.\par Pt receives procrit 80,000 units weekly and B12 IM monthly. \par Continued Revlimid 5 mg daily x 3 weeks, has 1 dose left tonight. \par CBC reviewed: WBC 3.2, H/H 8.1/25.2, neutrophils 1.6, PLT 72\par \par 4/20/2020: The patient is here for follow up . She has no complaints of fever, chills, dizziness , chest pain and shortness of breath . She is still with diarrhea , up to 10 watery BMs per day, responds poorly to imodium and lomotil. She is on Revlimid 5 mg daily , took last dose yesterday night. Her last Procrit was on April 13. \par \par 5/26/20\par Pt is here for follow up.\par She is feeling well. Denies SOB, chest pain, fever.\par Continues to have diarrhea although she is off of Revlmid.\par Thinks it may due to diabetic meds.\par Wants to discuss BM bx results\par \par 6/22/20\par Pt is here for follow up.\par Feels well. Denies any fever, N, V, D\par Continues to have diarrhea, she will talk to her PCP about changing metformin for DM.\par Has been needing PRBCs 1 unit each month\par \par 7/20/20\par Pt is here for follow up.\par No new complaints. Has been feeling well.\par No SOB, CP. \par No N, V, D.\par She has recd 2 units of PRBCs since May 20.\par \par 8/17/20\par Pt is here for a follow up visit.\par She is feeling well.\par No new symptoms. No N, V, D.\par No bruising or bleeding. She continues to need PRBCs every 2-3 weeks.\par \par 8/31/20\par Pt is here for follow up. She is feeling well. No N, V, D.\par She is tolerating the hydrea well. No SOB, CP\par No bruising ior bleeding\par \par 9/8/20\par Pt is here for follow up.\par She had been contacted by the [facility] last week to advise her to stop the hydrea. Pt did not check her messages and continued with Hydrea.\par No fever, N, V, bleeding.\par Saw Dr Ferrell last week. [de-identified] : \par

## 2020-09-11 NOTE — ASSESSMENT
[FreeTextEntry1] : # Lowrisk myelodysplastic syndrome, previously treated with  Revlimid and Procrit, now s/p 5 cycles of Vidaza which she tolerated well. 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 The plt count usually drops with Vidaza treatment.\par \par # Vitamin  B12 deficiency.\par \par # Thrombocytosis; continue ASA. \par Hold Hydrea for now,. CBC twice /week, may need  blood transfusion.\par \par # Hyperkalemia likely due to increased plts; pseudohyperkalemia: check plasma K\par \par Plan:\par Pt is s/p  Cycle # 5 of Vidaza sc daily x 7 days Q 28 days.\par SE discussed.\par \par Continue with Procrit 80,000 units weekly.\par Continue B12 injections monthly. \par \par \par Will monitor pt with  frequent CBCs.\par \par The antineoplastic therapy dose was adjusted according to pt's labs and anticipated tolerance.\par The high risk of complications and complexity associated with antineoplastic therapy administration has been explained to the pt and family.\par Treatment will be administered under my supervision.\par \par \par Called Dr Ferrell who suggested to complete at least 6 cycles of Vidaza before switching treatment. For thrombocytosis advised ASA only without added Hydrea.\par Pt advised about neutropenia and to krystyna for any fever\par RTC in 1 week, \par

## 2020-09-14 ENCOUNTER — APPOINTMENT (OUTPATIENT)
Dept: INFUSION THERAPY | Facility: CLINIC | Age: 75
End: 2020-09-14
Payer: MEDICARE

## 2020-09-14 ENCOUNTER — LABORATORY RESULT (OUTPATIENT)
Age: 75
End: 2020-09-14

## 2020-09-14 ENCOUNTER — APPOINTMENT (OUTPATIENT)
Dept: HEMATOLOGY ONCOLOGY | Facility: CLINIC | Age: 75
End: 2020-09-14
Payer: MEDICARE

## 2020-09-14 VITALS
WEIGHT: 140 LBS | TEMPERATURE: 98.3 F | DIASTOLIC BLOOD PRESSURE: 48 MMHG | SYSTOLIC BLOOD PRESSURE: 117 MMHG | HEIGHT: 62 IN | HEART RATE: 86 BPM | BODY MASS INDEX: 25.76 KG/M2

## 2020-09-14 LAB
ALBUMIN SERPL ELPH-MCNC: 4.2 G/DL
ALP BLD-CCNC: 99 U/L
ALT SERPL-CCNC: 15 U/L
ANION GAP SERPL CALC-SCNC: 12 MMOL/L
AST SERPL-CCNC: 11 U/L
BILIRUB SERPL-MCNC: 0.5 MG/DL
BUN SERPL-MCNC: 21 MG/DL
CALCIUM SERPL-MCNC: 8.8 MG/DL
CHLORIDE SERPL-SCNC: 104 MMOL/L
CO2 SERPL-SCNC: 22 MMOL/L
CREAT SERPL-MCNC: 1.2 MG/DL
GLUCOSE SERPL-MCNC: 208 MG/DL
HCT VFR BLD CALC: 22.6 %
HGB BLD-MCNC: 7.6 G/DL
LDH SERPL-CCNC: 127
MCHC RBC-ENTMCNC: 29.8 PG
MCHC RBC-ENTMCNC: 33.6 G/DL
MCV RBC AUTO: 88.6 FL
PLATELET # BLD AUTO: 274 K/UL
PMV BLD: 11.5 FL
POTASSIUM SERPL-SCNC: 4.9 MMOL/L
PROT SERPL-MCNC: 6.4 G/DL
RBC # BLD: 2.55 M/UL
RBC # FLD: 18.3 %
SODIUM SERPL-SCNC: 138 MMOL/L
URATE SERPL-MCNC: 5.5 MG/DL
WBC # FLD AUTO: 1.67 K/UL

## 2020-09-14 PROCEDURE — 99215 OFFICE O/P EST HI 40 MIN: CPT

## 2020-09-14 RX ORDER — ONDANSETRON 8 MG/1
8 TABLET, FILM COATED ORAL ONCE
Refills: 0 | Status: COMPLETED | OUTPATIENT
Start: 2020-09-14 | End: 2020-09-14

## 2020-09-14 RX ORDER — ERYTHROPOIETIN 10000 [IU]/ML
80000 INJECTION, SOLUTION INTRAVENOUS; SUBCUTANEOUS ONCE
Refills: 0 | Status: COMPLETED | OUTPATIENT
Start: 2020-09-14 | End: 2020-09-14

## 2020-09-14 RX ORDER — AZACITIDINE FOR 100 MG/1
123 INJECTION, POWDER, LYOPHILIZED, FOR SOLUTION INTRAVENOUS; SUBCUTANEOUS ONCE
Refills: 0 | Status: COMPLETED | OUTPATIENT
Start: 2020-09-14 | End: 2020-09-14

## 2020-09-14 RX ADMIN — ERYTHROPOIETIN 80000 UNIT(S): 10000 INJECTION, SOLUTION INTRAVENOUS; SUBCUTANEOUS at 13:19

## 2020-09-14 RX ADMIN — ONDANSETRON 8 MILLIGRAM(S): 8 TABLET, FILM COATED ORAL at 13:19

## 2020-09-14 RX ADMIN — AZACITIDINE FOR 123 MILLIGRAM(S): 100 INJECTION, POWDER, LYOPHILIZED, FOR SOLUTION INTRAVENOUS; SUBCUTANEOUS at 13:29

## 2020-09-14 NOTE — HISTORY OF PRESENT ILLNESS
[de-identified] : She missed on appointment for procrit last week.\par She starts her next cycle on 6/25/18\par C/o back pain radiating down her left which occurred when she bent to  something.\par No change in diarrhea.\par \par 7/31/18:\par Doing well. On Revlimid for more than 2yrs, 5mg 2weeks on 1 week off. She will be starting week on tonight. \par C/o diarrhea. On Imodium 2 pills AM and 2 pills PM. Doesn't help much with diarrhea. \par C/o nausea, abd pain. \par Colonoscopy in 2016 was normal. \par Did not have blood transfusion for over 2 years. \par On procrit [id number] weekly. Missed last week on 7/24/18 as she was out of town. She has been non compliant with weekly Procrit.\par Mammogram in 2017 was normal. \par \par 9/11/18\par pt is here for follow up.\par She feels the lomotil helps with the diarrhea.\par She was away for a few weeks and missed her procrit injections.\par No fever, abdominal  discomfort has been less since since use of lomotil.\par She started a new cycle 2 days ago.\par \par 10/2/18:\par She will start Revlimid tonight (week on). 2 weeks on, 1 week off. \par On ASA 81mg. \par Denies fever, nausea, vomiting, chest pain, SOB, abdominal pain, and bladder problems.\par C/o diarrhea. \par S/p 2 units PRBC transfusion on 9/25/18. \par On Procrit 00479 units weekly. Not compliant every week. \par C/o intermittent dizziness. \par \par 10/31/18\par Pt is here for follow up.\par C/O significant fatigue.\par Continues to have diarrhea, takes imodium and lomotil as needed.\par C/o dry cough, no fever.\par Cough started a month ago. It is worse in the mornings however over last week it has been constant all day.\par No chest pain.\par She was found to have low B12 levels and was started on B12 injections.\par \par 11/27/18:\par Doing well. Hospitalized for 3 days for cellulitis/abcess of the back s/p drainage and on Abx currently. Developed 3 days after Mg infusion as per pt. \par Denies nausea, vomiting, chest pain, SOB, abdominal pain, bowel and bladder problems.\par This is the week on Revlimid (week 1).\par S/p 1 unit PRBC transfusion in the hospital. \par On Procrit weekly \par \par 12/27/18:\par Doing well. No major complaints.\par Denies fever, nausea, vomiting, chest pain, SOB, abdominal pain, and bladder problems.\par On Revlimid 5 mg 2 weeks on 1 week off. This is the week off. She will start the week on sunday (12/30/18).\par Due for Procrit 94322 units today. \par Still has diarrhea. 4-5bm/day.\par On monthly B12 injections. \par \par 1/30/19:\par Patient here for follow up for MDS.  She is taking Revlimid 5 mg, 2 weeks on, 1 week off.  She will complete this current cycle on Saturday.  She has no new complaints today.  Patient denies cough, shortness of breath, denies fever, denies bone pain.\par \par 03/04/2019\par Patient is here for a follow up visit. She complaints of severe pain in her left shoulder and has had abscess drained 2 weeks ago. However the pain is worse and she has purulent discharge on examination. She also has Nasal sores that are painful. Culture from 11/2018 showed MRSA.  Denies Fever. \par She also complaints of swelling in her right leg. Not tender and not erythematous and negative Gong;s sign. \par Her diarrhea with Revlimid is ongoing and Imodium and Lomotil helps but not everyday. \par She is on 60,000 units of procrit weekly and Revlimid 5 mg 2 weeks on 1 week off. \par \par 4/23/19\par Pt is here for follow up.\par She feels well.\par The nasal sores have improved with bactroban\par The abscess on left shoulder has resolved.\par However she has a new one on the middle of her back.\par No fever.\par Pt has been on procrit 27246 units weekly, however she missed a dose in between.\par \par 5/8/19\par pt is here for follow up.\par no new complaints.\par feels well.\par Her skin abscess has resolved.\par she has been unable to go to ID, as she could not get an appt.\par No bleeding.\par She is compliant with revlimid.\par \par 6/6/19\par Pt is here for follow up.\par no new complaints \par Feels well.\par Is on week 2 of her Revlimid cycle. \par No transfusions since last visit\par \par 7/17/19\par Pt is here for follow up.\par C/O fatigue.\par Was away for a few days two weeks ago, but missed treatment with procrit for 2 weeks.\par Is complaint with Revlimid 2 weeks on 1 week off.\par Diarrhea is a little improved.\par \par \par 8/14/19\par Patient here for follow up visit, feeling well.  Although she is complaining of some pain at the left scapula area recently.  Patient denies cough, shortness of breath, denies fever, denies other bone pain.  She is off Revlimid  this week, due to restart on Monday.  She is scheduled for Procrit and Vitamin B12 injection today.  She plans to leave the country tomorrow to visit her mother who is ill.  She will return in about 10 days.\par \par 9/11/19\par Pt is here for follow up.\par She was away for 3 weeks, out of which she took procrit for 2 weeks in [place].\par She missed last week's dose.\par She had CBCs in [place] which showed Hgb of 8.9\par She feels well.\par She still getting diarrhea.\par She has been using lomotil with very minimal relief.\par She started a new cycle of Revlimid 4 days ago.\par \par 10/3/19\par Patient here for follow up visit, feeling fairly well.   Patient denies cough, shortness of breath, denies fever, denies other bone pain.  She remains on Revlimid.  She is scheduled for Procrit and Vitamin B12 injection today.\par \par 10/24/19\par Pt is here for follow up.\par Feels well.\par No SOB, fatigue.\par Compliant with procrit and Revl;imid.\par Remains on ASa.\par Diarrhea is unchanged.\par Pt takes imodium as needed.\par \par 11/14/19\par Pt is here for follow up.\par No new complaints.\par Starts a new cycle of Revlimid today.\par Diarrhea is same as before, no rectal bleeding.\par No fever.\par \par 12/5/19\par Pt is here for follow up.\par No new complaints.\par Denies feeling weaker than usual.\par No fever, SOB.\par No change in frequency of diarrhea.\par No pain.\par Will start next cycle of Revlimid in 3 days.\par \par \par !/16/20\par Pt was recently DCed from [facility] 3 days ago after being treated for pneumonia and MARCO.\par She is on po Levaquin.\par She restarted Revlmid 3 days ago.\par She is feeling better now. No fever.\par No SOB, Chest pain and back pain has resolved.\par Pt has a cough, no expectoration.\par She also recd a unit of PRBCs last week in the hospital\par \par \par 1/30/20\par Patient here for follow up visit, feeling well.  She has no new complaints. Cough is almost gone completely.  Patient denies shortness of breath, denies fever, denies bone pain.  Her appetite is ok, weight is stable.  She is due to restart Revlimid on Monday.\par \par 02/20/20\par Pt is here for follow up, feeling well.\par Offers no new complaints. Denies SOB, fever, chills, weight loss, CP, cough. \par Currently off week of Revlimid 5 mg. Restarts on Monday.\par Has procrit 80,000 units today. Next b 12 injection due in 2 weeks \par Did not get chest Xray\par CBC reviewed WBC 3.1, h/h 8.3/25%, plt 386, neutrophils 1.29\par \par 3/12/20\par Pt is here for follow up.\par She took Revlimid for 2 weeks and then has been off for one week although she was advised to take it daily without break.\par No SOB, Cough, fever.\par Has mild fatigue.\par \par 04/02/2020\par SIMONA KUHN a 75 year F is here today for follow up visit of MDS.\par Denies fever, chills, cough,night sweats, weight loss. \par Denies bleeding or bruising.\par Pt receives procrit 80,000 units weekly and B12 IM monthly. \par Continued Revlimid 5 mg daily x 3 weeks, has 1 dose left tonight. \par CBC reviewed: WBC 3.2, H/H 8.1/25.2, neutrophils 1.6, PLT 72\par \par 4/20/2020: The patient is here for follow up . She has no complaints of fever, chills, dizziness , chest pain and shortness of breath . She is still with diarrhea , up to 10 watery BMs per day, responds poorly to imodium and lomotil. She is on Revlimid 5 mg daily , took last dose yesterday night. Her last Procrit was on April 13. \par \par 5/26/20\par Pt is here for follow up.\par She is feeling well. Denies SOB, chest pain, fever.\par Continues to have diarrhea although she is off of Revlmid.\par Thinks it may due to diabetic meds.\par Wants to discuss BM bx results\par \par 6/22/20\par Pt is here for follow up.\par Feels well. Denies any fever, N, V, D\par Continues to have diarrhea, she will talk to her PCP about changing metformin for DM.\par Has been needing PRBCs 1 unit each month\par \par 7/20/20\par Pt is here for follow up.\par No new complaints. Has been feeling well.\par No SOB, CP. \par No N, V, D.\par She has recd 2 units of PRBCs since May 20.\par \par 8/17/20\par Pt is here for a follow up visit.\par She is feeling well.\par No new symptoms. No N, V, D.\par No bruising or bleeding. She continues to need PRBCs every 2-3 weeks.\par \par 8/31/20\par Pt is here for follow up. She is feeling well. No N, V, D.\par She is tolerating the hydrea well. No SOB, CP\par No bruising ior bleeding\par \par 9/8/20\par Pt is here for follow up.\par She had been contacted by the [facility] last week to advise her to stop the hydrea. Pt did not check her messages and continued with Hydrea.\par No fever, N, V, bleeding.\par Saw Dr Ferrell last week.\par \par 9/14/20\par Pt is here for folow up.\par Besides her usual complaint of diarrhea she is feeling well.\par Recd 1 unit PRBC last week.\par Has stopped Hydrea.\par No fever, mouth sores. [de-identified] : \par

## 2020-09-14 NOTE — ASSESSMENT
[FreeTextEntry1] : # Lowrisk myelodysplastic syndrome, previously treated with  Revlimid and Procrit, now s/p 5 cycles of Vidaza which she tolerated well. 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 The plt count usually drops with Vidaza treatment.\par \par # Vitamin  B12 deficiency.\par \par # Thrombocytosis; continue ASA. \par Hold Hydrea for now,. CBC twice /week, may need  blood transfusion.\par \par # Hyperkalemia likely due to increased plts; pseudohyperkalemia: check plasma K\par \par Plan:\par Pt is s/p  Cycle # 5 of Vidaza sc daily x 7 days Q 28 days..\par Proceed with cycle # 6 of vidaza.\par SE discussed.\par \par Continue with Procrit 80,000 units weekly.\par Continue B12 injections monthly. \par \par \par Will monitor pt with  frequent CBCs.\par \par The antineoplastic therapy dose was adjusted according to pt's labs and anticipated tolerance.\par The high risk of complications and complexity associated with antineoplastic therapy administration has been explained to the pt and family.\par Treatment will be administered under my supervision.\par \par \par Informed pt of my discussion with Dr Ferrell who suggested to complete at least 6 cycles of Vidaza before switching treatment. For thrombocytosis advised ASA only without added Hydrea.\par \par RTC in 2 weeks, \par

## 2020-09-14 NOTE — PHYSICAL EXAM
[Restricted in physically strenuous activity but ambulatory and able to carry out work of a light or sedentary nature] : Status 1- Restricted in physically strenuous activity but ambulatory and able to carry out work of a light or sedentary nature, e.g., light house work, office work [Normal] : grossly intact [de-identified] : comjunctival pallor

## 2020-09-15 ENCOUNTER — APPOINTMENT (OUTPATIENT)
Dept: INFUSION THERAPY | Facility: CLINIC | Age: 75
End: 2020-09-15

## 2020-09-15 ENCOUNTER — APPOINTMENT (OUTPATIENT)
Dept: HEMATOLOGY ONCOLOGY | Facility: CLINIC | Age: 75
End: 2020-09-15

## 2020-09-15 RX ORDER — ONDANSETRON 8 MG/1
8 TABLET, FILM COATED ORAL ONCE
Refills: 0 | Status: COMPLETED | OUTPATIENT
Start: 2020-09-15 | End: 2020-09-15

## 2020-09-15 RX ORDER — AZACITIDINE FOR 100 MG/1
123 INJECTION, POWDER, LYOPHILIZED, FOR SOLUTION INTRAVENOUS; SUBCUTANEOUS ONCE
Refills: 0 | Status: COMPLETED | OUTPATIENT
Start: 2020-09-15 | End: 2020-09-15

## 2020-09-15 RX ADMIN — ONDANSETRON 8 MILLIGRAM(S): 8 TABLET, FILM COATED ORAL at 15:14

## 2020-09-15 RX ADMIN — AZACITIDINE FOR 123 MILLIGRAM(S): 100 INJECTION, POWDER, LYOPHILIZED, FOR SOLUTION INTRAVENOUS; SUBCUTANEOUS at 15:31

## 2020-09-16 ENCOUNTER — APPOINTMENT (OUTPATIENT)
Dept: INFUSION THERAPY | Facility: CLINIC | Age: 75
End: 2020-09-16

## 2020-09-16 RX ORDER — AZACITIDINE FOR 100 MG/1
123 INJECTION, POWDER, LYOPHILIZED, FOR SOLUTION INTRAVENOUS; SUBCUTANEOUS ONCE
Refills: 0 | Status: COMPLETED | OUTPATIENT
Start: 2020-09-16 | End: 2020-09-16

## 2020-09-16 RX ORDER — ONDANSETRON 8 MG/1
8 TABLET, FILM COATED ORAL ONCE
Refills: 0 | Status: COMPLETED | OUTPATIENT
Start: 2020-09-16 | End: 2020-09-16

## 2020-09-16 RX ADMIN — AZACITIDINE FOR 123 MILLIGRAM(S): 100 INJECTION, POWDER, LYOPHILIZED, FOR SOLUTION INTRAVENOUS; SUBCUTANEOUS at 14:22

## 2020-09-16 RX ADMIN — ONDANSETRON 8 MILLIGRAM(S): 8 TABLET, FILM COATED ORAL at 14:22

## 2020-09-17 ENCOUNTER — APPOINTMENT (OUTPATIENT)
Dept: INFUSION THERAPY | Facility: CLINIC | Age: 75
End: 2020-09-17

## 2020-09-17 ENCOUNTER — LABORATORY RESULT (OUTPATIENT)
Age: 75
End: 2020-09-17

## 2020-09-17 LAB
ABO + RH PNL BLD: NORMAL
BLD GP AB SCN SERPL QL: NORMAL
HCT VFR BLD CALC: 22.6 %
HGB BLD-MCNC: 7.5 G/DL
MCHC RBC-ENTMCNC: 29.4 PG
MCHC RBC-ENTMCNC: 33.2 G/DL
MCV RBC AUTO: 88.6 FL
PLATELET # BLD AUTO: 365 K/UL
PMV BLD: 11.6 FL
RBC # BLD: 2.55 M/UL
RBC # FLD: 18.6 %
WBC # FLD AUTO: 2.57 K/UL

## 2020-09-17 RX ORDER — AZACITIDINE FOR 100 MG/1
123 INJECTION, POWDER, LYOPHILIZED, FOR SOLUTION INTRAVENOUS; SUBCUTANEOUS ONCE
Refills: 0 | Status: COMPLETED | OUTPATIENT
Start: 2020-09-17 | End: 2020-09-17

## 2020-09-17 RX ORDER — ONDANSETRON 8 MG/1
8 TABLET, FILM COATED ORAL ONCE
Refills: 0 | Status: COMPLETED | OUTPATIENT
Start: 2020-09-17 | End: 2020-09-17

## 2020-09-17 RX ADMIN — ONDANSETRON 8 MILLIGRAM(S): 8 TABLET, FILM COATED ORAL at 13:48

## 2020-09-17 RX ADMIN — AZACITIDINE FOR 123 MILLIGRAM(S): 100 INJECTION, POWDER, LYOPHILIZED, FOR SOLUTION INTRAVENOUS; SUBCUTANEOUS at 14:08

## 2020-09-18 ENCOUNTER — APPOINTMENT (OUTPATIENT)
Dept: INFUSION THERAPY | Facility: CLINIC | Age: 75
End: 2020-09-18

## 2020-09-18 RX ORDER — AZACITIDINE FOR 100 MG/1
123 INJECTION, POWDER, LYOPHILIZED, FOR SOLUTION INTRAVENOUS; SUBCUTANEOUS ONCE
Refills: 0 | Status: COMPLETED | OUTPATIENT
Start: 2020-09-18 | End: 2020-09-18

## 2020-09-18 RX ORDER — ONDANSETRON 8 MG/1
8 TABLET, FILM COATED ORAL ONCE
Refills: 0 | Status: COMPLETED | OUTPATIENT
Start: 2020-09-18 | End: 2020-09-18

## 2020-09-18 RX ADMIN — AZACITIDINE FOR 123 MILLIGRAM(S): 100 INJECTION, POWDER, LYOPHILIZED, FOR SOLUTION INTRAVENOUS; SUBCUTANEOUS at 14:23

## 2020-09-18 RX ADMIN — ONDANSETRON 8 MILLIGRAM(S): 8 TABLET, FILM COATED ORAL at 13:47

## 2020-09-21 ENCOUNTER — LABORATORY RESULT (OUTPATIENT)
Age: 75
End: 2020-09-21

## 2020-09-21 ENCOUNTER — APPOINTMENT (OUTPATIENT)
Dept: INFUSION THERAPY | Facility: CLINIC | Age: 75
End: 2020-09-21

## 2020-09-21 LAB
HCT VFR BLD CALC: 27.3 %
HGB BLD-MCNC: 9.3 G/DL
MCHC RBC-ENTMCNC: 29.7 PG
MCHC RBC-ENTMCNC: 34.1 G/DL
MCV RBC AUTO: 87.2 FL
PLATELET # BLD AUTO: 425 K/UL
PMV BLD: 10.4 FL
RBC # BLD: 3.13 M/UL
RBC # FLD: 18.1 %
WBC # FLD AUTO: 2.81 K/UL

## 2020-09-21 RX ORDER — AZACITIDINE FOR 100 MG/1
123 INJECTION, POWDER, LYOPHILIZED, FOR SOLUTION INTRAVENOUS; SUBCUTANEOUS ONCE
Refills: 0 | Status: COMPLETED | OUTPATIENT
Start: 2020-09-21 | End: 2020-09-21

## 2020-09-21 RX ORDER — ONDANSETRON 8 MG/1
8 TABLET, FILM COATED ORAL ONCE
Refills: 0 | Status: COMPLETED | OUTPATIENT
Start: 2020-09-21 | End: 2020-09-21

## 2020-09-21 RX ORDER — PREGABALIN 225 MG/1
1000 CAPSULE ORAL ONCE
Refills: 0 | Status: COMPLETED | OUTPATIENT
Start: 2020-09-21 | End: 2020-09-21

## 2020-09-21 RX ORDER — ERYTHROPOIETIN 10000 [IU]/ML
80000 INJECTION, SOLUTION INTRAVENOUS; SUBCUTANEOUS ONCE
Refills: 0 | Status: COMPLETED | OUTPATIENT
Start: 2020-09-21 | End: 2020-09-21

## 2020-09-21 RX ADMIN — ONDANSETRON 8 MILLIGRAM(S): 8 TABLET, FILM COATED ORAL at 14:53

## 2020-09-21 RX ADMIN — AZACITIDINE FOR 123 MILLIGRAM(S): 100 INJECTION, POWDER, LYOPHILIZED, FOR SOLUTION INTRAVENOUS; SUBCUTANEOUS at 15:07

## 2020-09-21 RX ADMIN — ERYTHROPOIETIN 80000 UNIT(S): 10000 INJECTION, SOLUTION INTRAVENOUS; SUBCUTANEOUS at 14:54

## 2020-09-21 RX ADMIN — PREGABALIN 1000 MICROGRAM(S): 225 CAPSULE ORAL at 14:53

## 2020-09-22 ENCOUNTER — LABORATORY RESULT (OUTPATIENT)
Age: 75
End: 2020-09-22

## 2020-09-22 ENCOUNTER — APPOINTMENT (OUTPATIENT)
Dept: INFUSION THERAPY | Facility: CLINIC | Age: 75
End: 2020-09-22

## 2020-09-22 ENCOUNTER — OUTPATIENT (OUTPATIENT)
Dept: OUTPATIENT SERVICES | Facility: HOSPITAL | Age: 75
LOS: 1 days | Discharge: HOME | End: 2020-09-22

## 2020-09-22 DIAGNOSIS — Z51.11 ENCOUNTER FOR ANTINEOPLASTIC CHEMOTHERAPY: ICD-10-CM

## 2020-09-22 DIAGNOSIS — E53.8 DEFICIENCY OF OTHER SPECIFIED B GROUP VITAMINS: ICD-10-CM

## 2020-09-22 DIAGNOSIS — D46.9 MYELODYSPLASTIC SYNDROME, UNSPECIFIED: ICD-10-CM

## 2020-09-22 DIAGNOSIS — D64.9 ANEMIA, UNSPECIFIED: ICD-10-CM

## 2020-09-22 RX ORDER — ONDANSETRON 8 MG/1
8 TABLET, FILM COATED ORAL ONCE
Refills: 0 | Status: COMPLETED | OUTPATIENT
Start: 2020-09-22 | End: 2020-09-22

## 2020-09-22 RX ORDER — AZACITIDINE FOR 100 MG/1
123 INJECTION, POWDER, LYOPHILIZED, FOR SOLUTION INTRAVENOUS; SUBCUTANEOUS ONCE
Refills: 0 | Status: COMPLETED | OUTPATIENT
Start: 2020-09-22 | End: 2020-09-22

## 2020-09-22 RX ADMIN — AZACITIDINE FOR 123 MILLIGRAM(S): 100 INJECTION, POWDER, LYOPHILIZED, FOR SOLUTION INTRAVENOUS; SUBCUTANEOUS at 15:06

## 2020-09-22 RX ADMIN — ONDANSETRON 8 MILLIGRAM(S): 8 TABLET, FILM COATED ORAL at 14:42

## 2020-09-23 LAB
HCT VFR BLD CALC: 28.7 %
HGB BLD-MCNC: 9.5 G/DL
MCHC RBC-ENTMCNC: 29.5 PG
MCHC RBC-ENTMCNC: 33.1 G/DL
MCV RBC AUTO: 89.1 FL
PLATELET # BLD AUTO: 411 K/UL
PMV BLD: 11.1 FL
RBC # BLD: 3.22 M/UL
RBC # FLD: 17.9 %
WBC # FLD AUTO: 3.26 K/UL

## 2020-09-29 ENCOUNTER — APPOINTMENT (OUTPATIENT)
Dept: HEMATOLOGY ONCOLOGY | Facility: CLINIC | Age: 75
End: 2020-09-29
Payer: MEDICARE

## 2020-09-29 ENCOUNTER — LABORATORY RESULT (OUTPATIENT)
Age: 75
End: 2020-09-29

## 2020-09-29 ENCOUNTER — APPOINTMENT (OUTPATIENT)
Dept: INFUSION THERAPY | Facility: CLINIC | Age: 75
End: 2020-09-29
Payer: MEDICARE

## 2020-09-29 VITALS
WEIGHT: 136 LBS | SYSTOLIC BLOOD PRESSURE: 131 MMHG | DIASTOLIC BLOOD PRESSURE: 59 MMHG | BODY MASS INDEX: 25.03 KG/M2 | HEART RATE: 77 BPM | RESPIRATION RATE: 16 BRPM | HEIGHT: 62 IN | TEMPERATURE: 98 F

## 2020-09-29 LAB
HCT VFR BLD CALC: 26.8 %
HGB BLD-MCNC: 8.9 G/DL
MCHC RBC-ENTMCNC: 29.8 PG
MCHC RBC-ENTMCNC: 33.2 G/DL
MCV RBC AUTO: 89.6 FL
PLATELET # BLD AUTO: 323 K/UL
PMV BLD: 10.7 FL
RBC # BLD: 2.99 M/UL
RBC # FLD: 18.9 %
WBC # FLD AUTO: 4.52 K/UL

## 2020-09-29 PROCEDURE — 99214 OFFICE O/P EST MOD 30 MIN: CPT

## 2020-09-29 RX ORDER — ERYTHROPOIETIN 10000 [IU]/ML
80000 INJECTION, SOLUTION INTRAVENOUS; SUBCUTANEOUS ONCE
Refills: 0 | Status: COMPLETED | OUTPATIENT
Start: 2020-09-29 | End: 2020-09-29

## 2020-09-29 RX ADMIN — ERYTHROPOIETIN 80000 UNIT(S): 10000 INJECTION, SOLUTION INTRAVENOUS; SUBCUTANEOUS at 13:59

## 2020-09-29 NOTE — ASSESSMENT
[FreeTextEntry1] : # Lowrisk myelodysplastic syndrome, previously treated with  Revlimid and Procrit, now s/p 5 cycles of Vidaza which she tolerated well. 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 \par \par # Vitamin  B12 deficiency.\par \par # Thrombocytosis; continue ASA. \par Hold Hydrea for now,. CBC weekly, may need  blood transfusion.\par \par # Hyperkalemia likely due to increased plts; pseudohyperkalemia: \par \par Plan:\par Pt is s/p  Cycle # 6 of Vidaza sc daily x 7 days Q 28 days..\par \par SE discussed.\par \par Continue with Procrit 80,000 units weekly.\par Continue B12 injections monthly. \par \par \par Will monitor pt with  frequent CBCs.\par \par The antineoplastic therapy dose was adjusted according to pt's labs and anticipated tolerance.\par The high risk of complications and complexity associated with antineoplastic therapy administration has been explained to the pt and family.\par Treatment will be administered under my supervision.\par \par \par Informed pt of my discussion with Dr Ferrell who suggested to complete at least 6 cycles of Vidaza before switching treatment. For thrombocytosis advised ASA only without added Hydrea.\par \par RTC in 2 weeks, \par

## 2020-09-29 NOTE — HISTORY OF PRESENT ILLNESS
[de-identified] : She missed on appointment for procrit last week.\par She starts her next cycle on 6/25/18\par C/o back pain radiating down her left which occurred when she bent to  something.\par No change in diarrhea.\par \par 7/31/18:\par Doing well. On Revlimid for more than 2yrs, 5mg 2weeks on 1 week off. She will be starting week on tonight. \par C/o diarrhea. On Imodium 2 pills AM and 2 pills PM. Doesn't help much with diarrhea. \par C/o nausea, abd pain. \par Colonoscopy in 2016 was normal. \par Did not have blood transfusion for over 2 years. \par On procrit [id number] weekly. Missed last week on 7/24/18 as she was out of town. She has been non compliant with weekly Procrit.\par Mammogram in 2017 was normal. \par \par 9/11/18\par pt is here for follow up.\par She feels the lomotil helps with the diarrhea.\par She was away for a few weeks and missed her procrit injections.\par No fever, abdominal  discomfort has been less since since use of lomotil.\par She started a new cycle 2 days ago.\par \par 10/2/18:\par She will start Revlimid tonight (week on). 2 weeks on, 1 week off. \par On ASA 81mg. \par Denies fever, nausea, vomiting, chest pain, SOB, abdominal pain, and bladder problems.\par C/o diarrhea. \par S/p 2 units PRBC transfusion on 9/25/18. \par On Procrit 94404 units weekly. Not compliant every week. \par C/o intermittent dizziness. \par \par 10/31/18\par Pt is here for follow up.\par C/O significant fatigue.\par Continues to have diarrhea, takes imodium and lomotil as needed.\par C/o dry cough, no fever.\par Cough started a month ago. It is worse in the mornings however over last week it has been constant all day.\par No chest pain.\par She was found to have low B12 levels and was started on B12 injections.\par \par 11/27/18:\par Doing well. Hospitalized for 3 days for cellulitis/abcess of the back s/p drainage and on Abx currently. Developed 3 days after Mg infusion as per pt. \par Denies nausea, vomiting, chest pain, SOB, abdominal pain, bowel and bladder problems.\par This is the week on Revlimid (week 1).\par S/p 1 unit PRBC transfusion in the hospital. \par On Procrit weekly \par \par 12/27/18:\par Doing well. No major complaints.\par Denies fever, nausea, vomiting, chest pain, SOB, abdominal pain, and bladder problems.\par On Revlimid 5 mg 2 weeks on 1 week off. This is the week off. She will start the week on sunday (12/30/18).\par Due for Procrit 34412 units today. \par Still has diarrhea. 4-5bm/day.\par On monthly B12 injections. \par \par 1/30/19:\par Patient here for follow up for MDS.  She is taking Revlimid 5 mg, 2 weeks on, 1 week off.  She will complete this current cycle on Saturday.  She has no new complaints today.  Patient denies cough, shortness of breath, denies fever, denies bone pain.\par \par 03/04/2019\par Patient is here for a follow up visit. She complaints of severe pain in her left shoulder and has had abscess drained 2 weeks ago. However the pain is worse and she has purulent discharge on examination. She also has Nasal sores that are painful. Culture from 11/2018 showed MRSA.  Denies Fever. \par She also complaints of swelling in her right leg. Not tender and not erythematous and negative Gong;s sign. \par Her diarrhea with Revlimid is ongoing and Imodium and Lomotil helps but not everyday. \par She is on 60,000 units of procrit weekly and Revlimid 5 mg 2 weeks on 1 week off. \par \par 4/23/19\par Pt is here for follow up.\par She feels well.\par The nasal sores have improved with bactroban\par The abscess on left shoulder has resolved.\par However she has a new one on the middle of her back.\par No fever.\par Pt has been on procrit 30637 units weekly, however she missed a dose in between.\par \par 5/8/19\par pt is here for follow up.\par no new complaints.\par feels well.\par Her skin abscess has resolved.\par she has been unable to go to ID, as she could not get an appt.\par No bleeding.\par She is compliant with revlimid.\par \par 6/6/19\par Pt is here for follow up.\par no new complaints \par Feels well.\par Is on week 2 of her Revlimid cycle. \par No transfusions since last visit\par \par 7/17/19\par Pt is here for follow up.\par C/O fatigue.\par Was away for a few days two weeks ago, but missed treatment with procrit for 2 weeks.\par Is complaint with Revlimid 2 weeks on 1 week off.\par Diarrhea is a little improved.\par \par \par 8/14/19\par Patient here for follow up visit, feeling well.  Although she is complaining of some pain at the left scapula area recently.  Patient denies cough, shortness of breath, denies fever, denies other bone pain.  She is off Revlimid  this week, due to restart on Monday.  She is scheduled for Procrit and Vitamin B12 injection today.  She plans to leave the country tomorrow to visit her mother who is ill.  She will return in about 10 days.\par \par 9/11/19\par Pt is here for follow up.\par She was away for 3 weeks, out of which she took procrit for 2 weeks in [place].\par She missed last week's dose.\par She had CBCs in [place] which showed Hgb of 8.9\par She feels well.\par She still getting diarrhea.\par She has been using lomotil with very minimal relief.\par She started a new cycle of Revlimid 4 days ago.\par \par 10/3/19\par Patient here for follow up visit, feeling fairly well.   Patient denies cough, shortness of breath, denies fever, denies other bone pain.  She remains on Revlimid.  She is scheduled for Procrit and Vitamin B12 injection today.\par \par 10/24/19\par Pt is here for follow up.\par Feels well.\par No SOB, fatigue.\par Compliant with procrit and Revl;imid.\par Remains on ASa.\par Diarrhea is unchanged.\par Pt takes imodium as needed.\par \par 11/14/19\par Pt is here for follow up.\par No new complaints.\par Starts a new cycle of Revlimid today.\par Diarrhea is same as before, no rectal bleeding.\par No fever.\par \par 12/5/19\par Pt is here for follow up.\par No new complaints.\par Denies feeling weaker than usual.\par No fever, SOB.\par No change in frequency of diarrhea.\par No pain.\par Will start next cycle of Revlimid in 3 days.\par \par \par !/16/20\par Pt was recently DCed from [facility] 3 days ago after being treated for pneumonia and MARCO.\par She is on po Levaquin.\par She restarted Revlmid 3 days ago.\par She is feeling better now. No fever.\par No SOB, Chest pain and back pain has resolved.\par Pt has a cough, no expectoration.\par She also recd a unit of PRBCs last week in the hospital\par \par \par 1/30/20\par Patient here for follow up visit, feeling well.  She has no new complaints. Cough is almost gone completely.  Patient denies shortness of breath, denies fever, denies bone pain.  Her appetite is ok, weight is stable.  She is due to restart Revlimid on Monday.\par \par 02/20/20\par Pt is here for follow up, feeling well.\par Offers no new complaints. Denies SOB, fever, chills, weight loss, CP, cough. \par Currently off week of Revlimid 5 mg. Restarts on Monday.\par Has procrit 80,000 units today. Next b 12 injection due in 2 weeks \par Did not get chest Xray\par CBC reviewed WBC 3.1, h/h 8.3/25%, plt 386, neutrophils 1.29\par \par 3/12/20\par Pt is here for follow up.\par She took Revlimid for 2 weeks and then has been off for one week although she was advised to take it daily without break.\par No SOB, Cough, fever.\par Has mild fatigue.\par \par 04/02/2020\par SIMONA KUHN a 75 year F is here today for follow up visit of MDS.\par Denies fever, chills, cough,night sweats, weight loss. \par Denies bleeding or bruising.\par Pt receives procrit 80,000 units weekly and B12 IM monthly. \par Continued Revlimid 5 mg daily x 3 weeks, has 1 dose left tonight. \par CBC reviewed: WBC 3.2, H/H 8.1/25.2, neutrophils 1.6, PLT 72\par \par 4/20/2020: The patient is here for follow up . She has no complaints of fever, chills, dizziness , chest pain and shortness of breath . She is still with diarrhea , up to 10 watery BMs per day, responds poorly to imodium and lomotil. She is on Revlimid 5 mg daily , took last dose yesterday night. Her last Procrit was on April 13. \par \par 5/26/20\par Pt is here for follow up.\par She is feeling well. Denies SOB, chest pain, fever.\par Continues to have diarrhea although she is off of Revlmid.\par Thinks it may due to diabetic meds.\par Wants to discuss BM bx results\par \par 6/22/20\par Pt is here for follow up.\par Feels well. Denies any fever, N, V, D\par Continues to have diarrhea, she will talk to her PCP about changing metformin for DM.\par Has been needing PRBCs 1 unit each month\par \par 7/20/20\par Pt is here for follow up.\par No new complaints. Has been feeling well.\par No SOB, CP. \par No N, V, D.\par She has recd 2 units of PRBCs since May 20.\par \par 8/17/20\par Pt is here for a follow up visit.\par She is feeling well.\par No new symptoms. No N, V, D.\par No bruising or bleeding. She continues to need PRBCs every 2-3 weeks.\par \par 8/31/20\par Pt is here for follow up. She is feeling well. No N, V, D.\par She is tolerating the hydrea well. No SOB, CP\par No bruising ior bleeding\par \par 9/8/20\par Pt is here for follow up.\par She had been contacted by the [facility] last week to advise her to stop the hydrea. Pt did not check her messages and continued with Hydrea.\par No fever, N, V, bleeding.\par Saw Dr Ferrell last week.\par \par 9/14/20\par Pt is here for folow up.\par Besides her usual complaint of diarrhea she is feeling well.\par Recd 1 unit PRBC last week.\par Has stopped Hydrea.\par No fever, mouth sores.\par \par 9/29/20\par Pt is here for folow up.\par No new complaints.\par Is seeing GI for diarrhea net week.\par No fever, night sweats.\par REcd 3 units of PRBC this month [de-identified] : \par

## 2020-09-29 NOTE — PHYSICAL EXAM
[Restricted in physically strenuous activity but ambulatory and able to carry out work of a light or sedentary nature] : Status 1- Restricted in physically strenuous activity but ambulatory and able to carry out work of a light or sedentary nature, e.g., light house work, office work [Normal] : affect appropriate [de-identified] : comjunctival pallor

## 2020-09-30 LAB
ALBUMIN SERPL ELPH-MCNC: 4.3 G/DL
ALP BLD-CCNC: 127 U/L
ALT SERPL-CCNC: 32 U/L
ANION GAP SERPL CALC-SCNC: 12 MMOL/L
AST SERPL-CCNC: 15 U/L
BILIRUB SERPL-MCNC: 0.6 MG/DL
BUN SERPL-MCNC: 30 MG/DL
CALCIUM SERPL-MCNC: 8.9 MG/DL
CHLORIDE SERPL-SCNC: 103 MMOL/L
CO2 SERPL-SCNC: 19 MMOL/L
CREAT SERPL-MCNC: 1.6 MG/DL
GLUCOSE SERPL-MCNC: 262 MG/DL
LDH SERPL-CCNC: 164
POTASSIUM SERPL-SCNC: 5.4 MMOL/L
PROT SERPL-MCNC: 6.4 G/DL
SODIUM SERPL-SCNC: 134 MMOL/L
URATE SERPL-MCNC: 7.5 MG/DL

## 2020-09-30 NOTE — ED ADULT NURSE NOTE - SUICIDE SCREENING DEPRESSION
Abdominal Pain, N/V/D
Negative
THELMA Thurman: Re-evaluated patient bedside who desatted to 87% on room air with good wave form. Placed patient 2L nasal cannula with improvement of SpO2 94%. Patient feels symptoms improved with duoneb. Will admit patient for hypoxia
THELMA Bolton: discussed case with PMD who recommended medical admission to [facility]

## 2020-10-01 ENCOUNTER — APPOINTMENT (OUTPATIENT)
Dept: HEMATOLOGY ONCOLOGY | Facility: CLINIC | Age: 75
End: 2020-10-01

## 2020-10-02 LAB
ALBUMIN SERPL ELPH-MCNC: 4.5 G/DL
ALP BLD-CCNC: 114 U/L
ALT SERPL-CCNC: 43 U/L
ANION GAP SERPL CALC-SCNC: 16 MMOL/L
AST SERPL-CCNC: 15 U/L
BILIRUB SERPL-MCNC: 0.6 MG/DL
BUN SERPL-MCNC: 29 MG/DL
CALCIUM SERPL-MCNC: 9.1 MG/DL
CHLORIDE SERPL-SCNC: 99 MMOL/L
CO2 SERPL-SCNC: 16 MMOL/L
CREAT SERPL-MCNC: 1.6 MG/DL
GLUCOSE SERPL-MCNC: 302 MG/DL
POTASSIUM SERPL-SCNC: 4.7 MMOL/L
PROT SERPL-MCNC: 6.7 G/DL
SODIUM SERPL-SCNC: 131 MMOL/L

## 2020-10-05 ENCOUNTER — LABORATORY RESULT (OUTPATIENT)
Age: 75
End: 2020-10-05

## 2020-10-05 ENCOUNTER — APPOINTMENT (OUTPATIENT)
Dept: INFUSION THERAPY | Facility: CLINIC | Age: 75
End: 2020-10-05

## 2020-10-05 LAB
HCT VFR BLD CALC: 27.1 %
HGB BLD-MCNC: 9.1 G/DL
MCHC RBC-ENTMCNC: 30.6 PG
MCHC RBC-ENTMCNC: 33.6 G/DL
MCV RBC AUTO: 91.2 FL
PLATELET # BLD AUTO: 364 K/UL
PMV BLD: 10.8 FL
RBC # BLD: 2.97 M/UL
RBC # FLD: 20 %
WBC # FLD AUTO: 3.35 K/UL

## 2020-10-05 RX ORDER — SODIUM CHLORIDE 9 MG/ML
1000 INJECTION INTRAMUSCULAR; INTRAVENOUS; SUBCUTANEOUS
Refills: 0 | Status: DISCONTINUED | OUTPATIENT
Start: 2020-10-05 | End: 2021-04-04

## 2020-10-05 RX ORDER — ERYTHROPOIETIN 10000 [IU]/ML
80000 INJECTION, SOLUTION INTRAVENOUS; SUBCUTANEOUS ONCE
Refills: 0 | Status: COMPLETED | OUTPATIENT
Start: 2020-10-05 | End: 2020-10-05

## 2020-10-05 RX ADMIN — SODIUM CHLORIDE 1000 MILLILITER(S): 9 INJECTION INTRAMUSCULAR; INTRAVENOUS; SUBCUTANEOUS at 15:00

## 2020-10-05 RX ADMIN — SODIUM CHLORIDE 500 MILLILITER(S): 9 INJECTION INTRAMUSCULAR; INTRAVENOUS; SUBCUTANEOUS at 13:00

## 2020-10-05 RX ADMIN — ERYTHROPOIETIN 80000 UNIT(S): 10000 INJECTION, SOLUTION INTRAVENOUS; SUBCUTANEOUS at 13:12

## 2020-10-06 LAB — URATE SERPL-MCNC: 6.6 MG/DL

## 2020-10-13 ENCOUNTER — LABORATORY RESULT (OUTPATIENT)
Age: 75
End: 2020-10-13

## 2020-10-13 ENCOUNTER — APPOINTMENT (OUTPATIENT)
Dept: INFUSION THERAPY | Facility: CLINIC | Age: 75
End: 2020-10-13
Payer: MEDICARE

## 2020-10-13 ENCOUNTER — APPOINTMENT (OUTPATIENT)
Dept: HEMATOLOGY ONCOLOGY | Facility: CLINIC | Age: 75
End: 2020-10-13
Payer: MEDICARE

## 2020-10-13 VITALS
WEIGHT: 139 LBS | HEIGHT: 62 IN | HEART RATE: 101 BPM | SYSTOLIC BLOOD PRESSURE: 126 MMHG | BODY MASS INDEX: 25.58 KG/M2 | TEMPERATURE: 98.3 F | DIASTOLIC BLOOD PRESSURE: 56 MMHG

## 2020-10-13 LAB
HCT VFR BLD CALC: 25.7 %
HGB BLD-MCNC: 8.5 G/DL
MCHC RBC-ENTMCNC: 30.8 PG
MCHC RBC-ENTMCNC: 33.1 G/DL
MCV RBC AUTO: 93.1 FL
PLATELET # BLD AUTO: 680 K/UL
PMV BLD: 9.8 FL
RBC # BLD: 2.76 M/UL
RBC # FLD: 21.8 %
WBC # FLD AUTO: 6.05 K/UL

## 2020-10-13 PROCEDURE — 99215 OFFICE O/P EST HI 40 MIN: CPT

## 2020-10-13 RX ORDER — ONDANSETRON 8 MG/1
8 TABLET, FILM COATED ORAL ONCE
Refills: 0 | Status: COMPLETED | OUTPATIENT
Start: 2020-10-13 | End: 2020-10-13

## 2020-10-13 RX ORDER — ERYTHROPOIETIN 10000 [IU]/ML
80000 INJECTION, SOLUTION INTRAVENOUS; SUBCUTANEOUS ONCE
Refills: 0 | Status: COMPLETED | OUTPATIENT
Start: 2020-10-13 | End: 2020-10-13

## 2020-10-13 RX ORDER — AZACITIDINE FOR 100 MG/1
123 INJECTION, POWDER, LYOPHILIZED, FOR SOLUTION INTRAVENOUS; SUBCUTANEOUS ONCE
Refills: 0 | Status: COMPLETED | OUTPATIENT
Start: 2020-10-13 | End: 2020-10-13

## 2020-10-13 RX ADMIN — AZACITIDINE FOR 123 MILLIGRAM(S): 100 INJECTION, POWDER, LYOPHILIZED, FOR SOLUTION INTRAVENOUS; SUBCUTANEOUS at 12:56

## 2020-10-13 RX ADMIN — ONDANSETRON 8 MILLIGRAM(S): 8 TABLET, FILM COATED ORAL at 12:42

## 2020-10-13 RX ADMIN — ERYTHROPOIETIN 80000 UNIT(S): 10000 INJECTION, SOLUTION INTRAVENOUS; SUBCUTANEOUS at 12:43

## 2020-10-14 ENCOUNTER — APPOINTMENT (OUTPATIENT)
Dept: INFUSION THERAPY | Facility: CLINIC | Age: 75
End: 2020-10-14

## 2020-10-14 RX ORDER — AZACITIDINE FOR 100 MG/1
123 INJECTION, POWDER, LYOPHILIZED, FOR SOLUTION INTRAVENOUS; SUBCUTANEOUS ONCE
Refills: 0 | Status: COMPLETED | OUTPATIENT
Start: 2020-10-14 | End: 2020-10-14

## 2020-10-14 RX ORDER — ONDANSETRON 8 MG/1
8 TABLET, FILM COATED ORAL ONCE
Refills: 0 | Status: COMPLETED | OUTPATIENT
Start: 2020-10-14 | End: 2020-10-14

## 2020-10-14 RX ADMIN — ONDANSETRON 8 MILLIGRAM(S): 8 TABLET, FILM COATED ORAL at 12:24

## 2020-10-14 RX ADMIN — AZACITIDINE FOR 123 MILLIGRAM(S): 100 INJECTION, POWDER, LYOPHILIZED, FOR SOLUTION INTRAVENOUS; SUBCUTANEOUS at 12:52

## 2020-10-15 ENCOUNTER — LABORATORY RESULT (OUTPATIENT)
Age: 75
End: 2020-10-15

## 2020-10-15 ENCOUNTER — APPOINTMENT (OUTPATIENT)
Dept: INFUSION THERAPY | Facility: CLINIC | Age: 75
End: 2020-10-15

## 2020-10-15 LAB
ALBUMIN SERPL ELPH-MCNC: 4.4 G/DL
ALP BLD-CCNC: 65 U/L
ALT SERPL-CCNC: 35 U/L
ANION GAP SERPL CALC-SCNC: 16 MMOL/L
AST SERPL-CCNC: 22 U/L
BILIRUB SERPL-MCNC: 0.8 MG/DL
BUN SERPL-MCNC: 26 MG/DL
CALCIUM SERPL-MCNC: 9.4 MG/DL
CHLORIDE SERPL-SCNC: 100 MMOL/L
CO2 SERPL-SCNC: 20 MMOL/L
CREAT SERPL-MCNC: 1.4 MG/DL
GLUCOSE SERPL-MCNC: 313 MG/DL
HCT VFR BLD CALC: 25.6 %
HGB BLD-MCNC: 8.3 G/DL
LDH SERPL-CCNC: 147
MCHC RBC-ENTMCNC: 30.5 PG
MCHC RBC-ENTMCNC: 32.4 G/DL
MCV RBC AUTO: 94.1 FL
PLATELET # BLD AUTO: 752 K/UL
PMV BLD: 9.5 FL
POTASSIUM SERPL-SCNC: 5.4 MMOL/L
PROT SERPL-MCNC: 6.4 G/DL
RBC # BLD: 2.72 M/UL
RBC # FLD: 22.4 %
SODIUM SERPL-SCNC: 136 MMOL/L
URATE SERPL-MCNC: 5.1 MG/DL
WBC # FLD AUTO: 8.08 K/UL

## 2020-10-15 RX ORDER — AZACITIDINE FOR 100 MG/1
123 INJECTION, POWDER, LYOPHILIZED, FOR SOLUTION INTRAVENOUS; SUBCUTANEOUS ONCE
Refills: 0 | Status: COMPLETED | OUTPATIENT
Start: 2020-10-15 | End: 2020-10-15

## 2020-10-15 RX ORDER — ONDANSETRON 8 MG/1
8 TABLET, FILM COATED ORAL ONCE
Refills: 0 | Status: COMPLETED | OUTPATIENT
Start: 2020-10-15 | End: 2020-10-15

## 2020-10-15 RX ADMIN — ONDANSETRON 8 MILLIGRAM(S): 8 TABLET, FILM COATED ORAL at 10:00

## 2020-10-15 RX ADMIN — AZACITIDINE FOR 123 MILLIGRAM(S): 100 INJECTION, POWDER, LYOPHILIZED, FOR SOLUTION INTRAVENOUS; SUBCUTANEOUS at 10:15

## 2020-10-16 NOTE — HISTORY OF PRESENT ILLNESS
[de-identified] : She missed on appointment for procrit last week.\par She starts her next cycle on 6/25/18\par C/o back pain radiating down her left which occurred when she bent to  something.\par No change in diarrhea.\par \par 7/31/18:\par Doing well. On Revlimid for more than 2yrs, 5mg 2weeks on 1 week off. She will be starting week on tonight. \par C/o diarrhea. On Imodium 2 pills AM and 2 pills PM. Doesn't help much with diarrhea. \par C/o nausea, abd pain. \par Colonoscopy in 2016 was normal. \par Did not have blood transfusion for over 2 years. \par On procrit [id number] weekly. Missed last week on 7/24/18 as she was out of town. She has been non compliant with weekly Procrit.\par Mammogram in 2017 was normal. \par \par 9/11/18\par pt is here for follow up.\par She feels the lomotil helps with the diarrhea.\par She was away for a few weeks and missed her procrit injections.\par No fever, abdominal  discomfort has been less since since use of lomotil.\par She started a new cycle 2 days ago.\par \par 10/2/18:\par She will start Revlimid tonight (week on). 2 weeks on, 1 week off. \par On ASA 81mg. \par Denies fever, nausea, vomiting, chest pain, SOB, abdominal pain, and bladder problems.\par C/o diarrhea. \par S/p 2 units PRBC transfusion on 9/25/18. \par On Procrit 97506 units weekly. Not compliant every week. \par C/o intermittent dizziness. \par \par 10/31/18\par Pt is here for follow up.\par C/O significant fatigue.\par Continues to have diarrhea, takes imodium and lomotil as needed.\par C/o dry cough, no fever.\par Cough started a month ago. It is worse in the mornings however over last week it has been constant all day.\par No chest pain.\par She was found to have low B12 levels and was started on B12 injections.\par \par 11/27/18:\par Doing well. Hospitalized for 3 days for cellulitis/abcess of the back s/p drainage and on Abx currently. Developed 3 days after Mg infusion as per pt. \par Denies nausea, vomiting, chest pain, SOB, abdominal pain, bowel and bladder problems.\par This is the week on Revlimid (week 1).\par S/p 1 unit PRBC transfusion in the hospital. \par On Procrit weekly \par \par 12/27/18:\par Doing well. No major complaints.\par Denies fever, nausea, vomiting, chest pain, SOB, abdominal pain, and bladder problems.\par On Revlimid 5 mg 2 weeks on 1 week off. This is the week off. She will start the week on sunday (12/30/18).\par Due for Procrit 16324 units today. \par Still has diarrhea. 4-5bm/day.\par On monthly B12 injections. \par \par 1/30/19:\par Patient here for follow up for MDS.  She is taking Revlimid 5 mg, 2 weeks on, 1 week off.  She will complete this current cycle on Saturday.  She has no new complaints today.  Patient denies cough, shortness of breath, denies fever, denies bone pain.\par \par 03/04/2019\par Patient is here for a follow up visit. She complaints of severe pain in her left shoulder and has had abscess drained 2 weeks ago. However the pain is worse and she has purulent discharge on examination. She also has Nasal sores that are painful. Culture from 11/2018 showed MRSA.  Denies Fever. \par She also complaints of swelling in her right leg. Not tender and not erythematous and negative Gong;s sign. \par Her diarrhea with Revlimid is ongoing and Imodium and Lomotil helps but not everyday. \par She is on 60,000 units of procrit weekly and Revlimid 5 mg 2 weeks on 1 week off. \par \par 4/23/19\par Pt is here for follow up.\par She feels well.\par The nasal sores have improved with bactroban\par The abscess on left shoulder has resolved.\par However she has a new one on the middle of her back.\par No fever.\par Pt has been on procrit 78826 units weekly, however she missed a dose in between.\par \par 5/8/19\par pt is here for follow up.\par no new complaints.\par feels well.\par Her skin abscess has resolved.\par she has been unable to go to ID, as she could not get an appt.\par No bleeding.\par She is compliant with revlimid.\par \par 6/6/19\par Pt is here for follow up.\par no new complaints \par Feels well.\par Is on week 2 of her Revlimid cycle. \par No transfusions since last visit\par \par 7/17/19\par Pt is here for follow up.\par C/O fatigue.\par Was away for a few days two weeks ago, but missed treatment with procrit for 2 weeks.\par Is complaint with Revlimid 2 weeks on 1 week off.\par Diarrhea is a little improved.\par \par \par 8/14/19\par Patient here for follow up visit, feeling well.  Although she is complaining of some pain at the left scapula area recently.  Patient denies cough, shortness of breath, denies fever, denies other bone pain.  She is off Revlimid  this week, due to restart on Monday.  She is scheduled for Procrit and Vitamin B12 injection today.  She plans to leave the country tomorrow to visit her mother who is ill.  She will return in about 10 days.\par \par 9/11/19\par Pt is here for follow up.\par She was away for 3 weeks, out of which she took procrit for 2 weeks in [place].\par She missed last week's dose.\par She had CBCs in [place] which showed Hgb of 8.9\par She feels well.\par She still getting diarrhea.\par She has been using lomotil with very minimal relief.\par She started a new cycle of Revlimid 4 days ago.\par \par 10/3/19\par Patient here for follow up visit, feeling fairly well.   Patient denies cough, shortness of breath, denies fever, denies other bone pain.  She remains on Revlimid.  She is scheduled for Procrit and Vitamin B12 injection today.\par \par 10/24/19\par Pt is here for follow up.\par Feels well.\par No SOB, fatigue.\par Compliant with procrit and Revl;imid.\par Remains on ASa.\par Diarrhea is unchanged.\par Pt takes imodium as needed.\par \par 11/14/19\par Pt is here for follow up.\par No new complaints.\par Starts a new cycle of Revlimid today.\par Diarrhea is same as before, no rectal bleeding.\par No fever.\par \par 12/5/19\par Pt is here for follow up.\par No new complaints.\par Denies feeling weaker than usual.\par No fever, SOB.\par No change in frequency of diarrhea.\par No pain.\par Will start next cycle of Revlimid in 3 days.\par \par \par !/16/20\par Pt was recently DCed from [facility] 3 days ago after being treated for pneumonia and MARCO.\par She is on po Levaquin.\par She restarted Revlmid 3 days ago.\par She is feeling better now. No fever.\par No SOB, Chest pain and back pain has resolved.\par Pt has a cough, no expectoration.\par She also recd a unit of PRBCs last week in the hospital\par \par \par 1/30/20\par Patient here for follow up visit, feeling well.  She has no new complaints. Cough is almost gone completely.  Patient denies shortness of breath, denies fever, denies bone pain.  Her appetite is ok, weight is stable.  She is due to restart Revlimid on Monday.\par \par 02/20/20\par Pt is here for follow up, feeling well.\par Offers no new complaints. Denies SOB, fever, chills, weight loss, CP, cough. \par Currently off week of Revlimid 5 mg. Restarts on Monday.\par Has procrit 80,000 units today. Next b 12 injection due in 2 weeks \par Did not get chest Xray\par CBC reviewed WBC 3.1, h/h 8.3/25%, plt 386, neutrophils 1.29\par \par 3/12/20\par Pt is here for follow up.\par She took Revlimid for 2 weeks and then has been off for one week although she was advised to take it daily without break.\par No SOB, Cough, fever.\par Has mild fatigue.\par \par 04/02/2020\par SIMONA KUHN a 75 year F is here today for follow up visit of MDS.\par Denies fever, chills, cough,night sweats, weight loss. \par Denies bleeding or bruising.\par Pt receives procrit 80,000 units weekly and B12 IM monthly. \par Continued Revlimid 5 mg daily x 3 weeks, has 1 dose left tonight. \par CBC reviewed: WBC 3.2, H/H 8.1/25.2, neutrophils 1.6, PLT 72\par \par 4/20/2020: The patient is here for follow up . She has no complaints of fever, chills, dizziness , chest pain and shortness of breath . She is still with diarrhea , up to 10 watery BMs per day, responds poorly to imodium and lomotil. She is on Revlimid 5 mg daily , took last dose yesterday night. Her last Procrit was on April 13. \par \par 5/26/20\par Pt is here for follow up.\par She is feeling well. Denies SOB, chest pain, fever.\par Continues to have diarrhea although she is off of Revlmid.\par Thinks it may due to diabetic meds.\par Wants to discuss BM bx results\par \par 6/22/20\par Pt is here for follow up.\par Feels well. Denies any fever, N, V, D\par Continues to have diarrhea, she will talk to her PCP about changing metformin for DM.\par Has been needing PRBCs 1 unit each month\par \par 7/20/20\par Pt is here for follow up.\par No new complaints. Has been feeling well.\par No SOB, CP. \par No N, V, D.\par She has recd 2 units of PRBCs since May 20.\par \par 8/17/20\par Pt is here for a follow up visit.\par She is feeling well.\par No new symptoms. No N, V, D.\par No bruising or bleeding. She continues to need PRBCs every 2-3 weeks.\par \par 8/31/20\par Pt is here for follow up. She is feeling well. No N, V, D.\par She is tolerating the hydrea well. No SOB, CP\par No bruising ior bleeding\par \par 9/8/20\par Pt is here for follow up.\par She had been contacted by the [facility] last week to advise her to stop the hydrea. Pt did not check her messages and continued with Hydrea.\par No fever, N, V, bleeding.\par Saw Dr Ferrell last week.\par \par 9/14/20\par Pt is here for folow up.\par Besides her usual complaint of diarrhea she is feeling well.\par Recd 1 unit PRBC last week.\par Has stopped Hydrea.\par No fever, mouth sores.\par \par 9/29/20\par Pt is here for folow up.\par No new complaints.\par Is seeing GI for diarrhea net week.\par No fever, night sweats.\par REcd 3 units of PRBC this month\par \par 10/13/20\par Pt is here for follow up.\par No new complaints.\par Has not needed a transfusion since 3 weeks ago.\par Continues to have diarrhea, waiting to be seen by GI [de-identified] : \par

## 2020-10-16 NOTE — ASSESSMENT
[FreeTextEntry1] : # Lowrisk myelodysplastic syndrome, previously treated with  Revlimid and Procrit, now s/p 5 cycles of Vidaza which she tolerated well. 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 \par \par # Vitamin  B12 deficiency.\par \par # Thrombocytosis; continue ASA. \par Hold Hydrea for now,. CBC weekly, may need  blood transfusion.\par \par # Hyperkalemia likely due to increased plts; pseudohyperkalemia: \par \par Plan:\par Pt is s/p  Cycle # 6 of Vidaza.\par Will proceed with cycle no 7 of Vidaza sc daily x 7 days Q 28 days..\par \par SE discussed.\par \par Continue with Procrit 80,000 units weekly.\par Continue B12 injections monthly. \par \par \par Will monitor pt with  frequent CBCs.\par \par The antineoplastic therapy dose was adjusted according to pt's labs and anticipated tolerance.\par The high risk of complications and complexity associated with antineoplastic therapy administration has been explained to the pt and family.\par Treatment will be administered under my supervision.\par \par \par Informed pt of my discussion with Dr Ferrell who suggested to complete at least 6 cycles of Vidaza before switching treatment. For thrombocytosis advised ASA only without added Hydrea.\par \par RTC in 2 weeks, \par

## 2020-10-16 NOTE — PHYSICAL EXAM
[Restricted in physically strenuous activity but ambulatory and able to carry out work of a light or sedentary nature] : Status 1- Restricted in physically strenuous activity but ambulatory and able to carry out work of a light or sedentary nature, e.g., light house work, office work [Normal] : affect appropriate [de-identified] : comjunctival pallor

## 2020-10-19 ENCOUNTER — APPOINTMENT (OUTPATIENT)
Dept: INFUSION THERAPY | Facility: CLINIC | Age: 75
End: 2020-10-19

## 2020-10-19 ENCOUNTER — LABORATORY RESULT (OUTPATIENT)
Age: 75
End: 2020-10-19

## 2020-10-19 LAB
HCT VFR BLD CALC: 21.9 %
HGB BLD-MCNC: 7.2 G/DL
MCHC RBC-ENTMCNC: 31 PG
MCHC RBC-ENTMCNC: 32.9 G/DL
MCV RBC AUTO: 94.4 FL
PLATELET # BLD AUTO: 804 K/UL
PMV BLD: 9.7 FL
RBC # BLD: 2.32 M/UL
RBC # FLD: 22.5 %
WBC # FLD AUTO: 4.72 K/UL

## 2020-10-19 RX ORDER — ERYTHROPOIETIN 10000 [IU]/ML
80000 INJECTION, SOLUTION INTRAVENOUS; SUBCUTANEOUS ONCE
Refills: 0 | Status: COMPLETED | OUTPATIENT
Start: 2020-10-19 | End: 2020-10-19

## 2020-10-19 RX ORDER — PREGABALIN 225 MG/1
1000 CAPSULE ORAL ONCE
Refills: 0 | Status: COMPLETED | OUTPATIENT
Start: 2020-10-19 | End: 2020-10-19

## 2020-10-19 RX ORDER — AZACITIDINE FOR 100 MG/1
123 INJECTION, POWDER, LYOPHILIZED, FOR SOLUTION INTRAVENOUS; SUBCUTANEOUS ONCE
Refills: 0 | Status: COMPLETED | OUTPATIENT
Start: 2020-10-19 | End: 2020-10-19

## 2020-10-19 RX ORDER — ONDANSETRON 8 MG/1
8 TABLET, FILM COATED ORAL ONCE
Refills: 0 | Status: COMPLETED | OUTPATIENT
Start: 2020-10-19 | End: 2020-10-19

## 2020-10-19 RX ADMIN — ERYTHROPOIETIN 80000 UNIT(S): 10000 INJECTION, SOLUTION INTRAVENOUS; SUBCUTANEOUS at 12:10

## 2020-10-19 RX ADMIN — PREGABALIN 1000 MICROGRAM(S): 225 CAPSULE ORAL at 12:09

## 2020-10-19 RX ADMIN — ONDANSETRON 8 MILLIGRAM(S): 8 TABLET, FILM COATED ORAL at 12:10

## 2020-10-19 RX ADMIN — AZACITIDINE FOR 123 MILLIGRAM(S): 100 INJECTION, POWDER, LYOPHILIZED, FOR SOLUTION INTRAVENOUS; SUBCUTANEOUS at 12:29

## 2020-10-20 ENCOUNTER — APPOINTMENT (OUTPATIENT)
Dept: INFUSION THERAPY | Facility: CLINIC | Age: 75
End: 2020-10-20

## 2020-10-20 RX ORDER — ONDANSETRON 8 MG/1
8 TABLET, FILM COATED ORAL ONCE
Refills: 0 | Status: COMPLETED | OUTPATIENT
Start: 2020-10-20 | End: 2020-10-20

## 2020-10-20 RX ORDER — AZACITIDINE FOR 100 MG/1
123 INJECTION, POWDER, LYOPHILIZED, FOR SOLUTION INTRAVENOUS; SUBCUTANEOUS ONCE
Refills: 0 | Status: COMPLETED | OUTPATIENT
Start: 2020-10-20 | End: 2020-10-20

## 2020-10-20 RX ADMIN — ONDANSETRON 8 MILLIGRAM(S): 8 TABLET, FILM COATED ORAL at 09:57

## 2020-10-20 RX ADMIN — AZACITIDINE FOR 123 MILLIGRAM(S): 100 INJECTION, POWDER, LYOPHILIZED, FOR SOLUTION INTRAVENOUS; SUBCUTANEOUS at 10:12

## 2020-10-21 ENCOUNTER — APPOINTMENT (OUTPATIENT)
Dept: INFUSION THERAPY | Facility: CLINIC | Age: 75
End: 2020-10-21

## 2020-10-21 RX ORDER — ONDANSETRON 8 MG/1
8 TABLET, FILM COATED ORAL ONCE
Refills: 0 | Status: COMPLETED | OUTPATIENT
Start: 2020-10-21 | End: 2020-10-21

## 2020-10-21 RX ORDER — AZACITIDINE FOR 100 MG/1
123 INJECTION, POWDER, LYOPHILIZED, FOR SOLUTION INTRAVENOUS; SUBCUTANEOUS ONCE
Refills: 0 | Status: COMPLETED | OUTPATIENT
Start: 2020-10-21 | End: 2020-10-21

## 2020-10-21 RX ADMIN — ONDANSETRON 8 MILLIGRAM(S): 8 TABLET, FILM COATED ORAL at 11:29

## 2020-10-21 RX ADMIN — AZACITIDINE FOR 123 MILLIGRAM(S): 100 INJECTION, POWDER, LYOPHILIZED, FOR SOLUTION INTRAVENOUS; SUBCUTANEOUS at 11:31

## 2020-10-22 ENCOUNTER — APPOINTMENT (OUTPATIENT)
Dept: INFUSION THERAPY | Facility: CLINIC | Age: 75
End: 2020-10-22

## 2020-10-22 RX ORDER — ONDANSETRON 8 MG/1
8 TABLET, FILM COATED ORAL ONCE
Refills: 0 | Status: COMPLETED | OUTPATIENT
Start: 2020-10-22 | End: 2020-10-22

## 2020-10-22 RX ORDER — AZACITIDINE FOR 100 MG/1
123 INJECTION, POWDER, LYOPHILIZED, FOR SOLUTION INTRAVENOUS; SUBCUTANEOUS ONCE
Refills: 0 | Status: COMPLETED | OUTPATIENT
Start: 2020-10-22 | End: 2020-10-22

## 2020-10-22 RX ADMIN — ONDANSETRON 8 MILLIGRAM(S): 8 TABLET, FILM COATED ORAL at 11:27

## 2020-10-22 RX ADMIN — AZACITIDINE FOR 123 MILLIGRAM(S): 100 INJECTION, POWDER, LYOPHILIZED, FOR SOLUTION INTRAVENOUS; SUBCUTANEOUS at 11:47

## 2020-10-26 ENCOUNTER — APPOINTMENT (OUTPATIENT)
Dept: INFUSION THERAPY | Facility: CLINIC | Age: 75
End: 2020-10-26
Payer: MEDICARE

## 2020-10-26 ENCOUNTER — APPOINTMENT (OUTPATIENT)
Dept: HEMATOLOGY ONCOLOGY | Facility: CLINIC | Age: 75
End: 2020-10-26
Payer: MEDICARE

## 2020-10-26 ENCOUNTER — LABORATORY RESULT (OUTPATIENT)
Age: 75
End: 2020-10-26

## 2020-10-26 VITALS
HEART RATE: 98 BPM | BODY MASS INDEX: 24.84 KG/M2 | SYSTOLIC BLOOD PRESSURE: 120 MMHG | HEIGHT: 62 IN | TEMPERATURE: 98.2 F | DIASTOLIC BLOOD PRESSURE: 53 MMHG | WEIGHT: 135 LBS

## 2020-10-26 LAB
HCT VFR BLD CALC: 19.9 %
HGB BLD-MCNC: 6.3 G/DL
MCHC RBC-ENTMCNC: 29.9 PG
MCHC RBC-ENTMCNC: 31.7 G/DL
MCV RBC AUTO: 94.3 FL
PLATELET # BLD AUTO: 433 K/UL
PMV BLD: 10.8 FL
RBC # BLD: 2.11 M/UL
RBC # FLD: 23.1 %
WBC # FLD AUTO: 3.44 K/UL

## 2020-10-26 PROCEDURE — 99214 OFFICE O/P EST MOD 30 MIN: CPT

## 2020-10-26 RX ORDER — ERYTHROPOIETIN 10000 [IU]/ML
80000 INJECTION, SOLUTION INTRAVENOUS; SUBCUTANEOUS ONCE
Refills: 0 | Status: COMPLETED | OUTPATIENT
Start: 2020-10-26 | End: 2020-10-26

## 2020-10-26 RX ADMIN — ERYTHROPOIETIN 80000 UNIT(S): 10000 INJECTION, SOLUTION INTRAVENOUS; SUBCUTANEOUS at 13:10

## 2020-10-26 NOTE — ASSESSMENT
[FreeTextEntry1] : # Lowrisk myelodysplastic syndrome, previously treated with  Revlimid and Procrit, now s/p 5 cycles of Vidaza which she tolerated well. 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 \par \par # Vitamin  B12 deficiency.\par \par # Thrombocytosis; continue ASA. \par Hold Hydrea for now,. CBC weekly, may need  blood transfusion.\par \par # Hyperkalemia likely due to increased plts; pseudohyperkalemia: \par \par Plan:\par Pt is s/p  Cycle # 7 of Vidaza.\par \par Her Hgb is low today with presence of symptoms.\par Will transfuse her with 1 unit PRBCs ( last transfusion was on  9/18/20)\par She will likely continue Vidaza\par SE discussed.\par \par Continue with Procrit 80,000 units weekly.\par Continue B12 injections monthly. \par \par \par Will monitor pt with  frequent CBCs.\par \par The antineoplastic therapy dose was adjusted according to pt's labs and anticipated tolerance.\par The high risk of complications and complexity associated with antineoplastic therapy administration has been explained to the pt and family.\par Treatment will be administered under my supervision.\par \par \par Informed pt of my discussion with Dr Ferrell who suggested to complete at least 6 cycles of Vidaza before switching treatment. For thrombocytosis advised ASA only without added Hydrea.\par \par RTC in 2 weeks, \par

## 2020-10-26 NOTE — HISTORY OF PRESENT ILLNESS
[de-identified] : She missed on appointment for procrit last week.\par She starts her next cycle on 6/25/18\par C/o back pain radiating down her left which occurred when she bent to  something.\par No change in diarrhea.\par \par 7/31/18:\par Doing well. On Revlimid for more than 2yrs, 5mg 2weeks on 1 week off. She will be starting week on tonight. \par C/o diarrhea. On Imodium 2 pills AM and 2 pills PM. Doesn't help much with diarrhea. \par C/o nausea, abd pain. \par Colonoscopy in 2016 was normal. \par Did not have blood transfusion for over 2 years. \par On procrit [id number] weekly. Missed last week on 7/24/18 as she was out of town. She has been non compliant with weekly Procrit.\par Mammogram in 2017 was normal. \par \par 9/11/18\par pt is here for follow up.\par She feels the lomotil helps with the diarrhea.\par She was away for a few weeks and missed her procrit injections.\par No fever, abdominal  discomfort has been less since since use of lomotil.\par She started a new cycle 2 days ago.\par \par 10/2/18:\par She will start Revlimid tonight (week on). 2 weeks on, 1 week off. \par On ASA 81mg. \par Denies fever, nausea, vomiting, chest pain, SOB, abdominal pain, and bladder problems.\par C/o diarrhea. \par S/p 2 units PRBC transfusion on 9/25/18. \par On Procrit 75309 units weekly. Not compliant every week. \par C/o intermittent dizziness. \par \par 10/31/18\par Pt is here for follow up.\par C/O significant fatigue.\par Continues to have diarrhea, takes imodium and lomotil as needed.\par C/o dry cough, no fever.\par Cough started a month ago. It is worse in the mornings however over last week it has been constant all day.\par No chest pain.\par She was found to have low B12 levels and was started on B12 injections.\par \par 11/27/18:\par Doing well. Hospitalized for 3 days for cellulitis/abcess of the back s/p drainage and on Abx currently. Developed 3 days after Mg infusion as per pt. \par Denies nausea, vomiting, chest pain, SOB, abdominal pain, bowel and bladder problems.\par This is the week on Revlimid (week 1).\par S/p 1 unit PRBC transfusion in the hospital. \par On Procrit weekly \par \par 12/27/18:\par Doing well. No major complaints.\par Denies fever, nausea, vomiting, chest pain, SOB, abdominal pain, and bladder problems.\par On Revlimid 5 mg 2 weeks on 1 week off. This is the week off. She will start the week on sunday (12/30/18).\par Due for Procrit 54313 units today. \par Still has diarrhea. 4-5bm/day.\par On monthly B12 injections. \par \par 1/30/19:\par Patient here for follow up for MDS.  She is taking Revlimid 5 mg, 2 weeks on, 1 week off.  She will complete this current cycle on Saturday.  She has no new complaints today.  Patient denies cough, shortness of breath, denies fever, denies bone pain.\par \par 03/04/2019\par Patient is here for a follow up visit. She complaints of severe pain in her left shoulder and has had abscess drained 2 weeks ago. However the pain is worse and she has purulent discharge on examination. She also has Nasal sores that are painful. Culture from 11/2018 showed MRSA.  Denies Fever. \par She also complaints of swelling in her right leg. Not tender and not erythematous and negative Gong;s sign. \par Her diarrhea with Revlimid is ongoing and Imodium and Lomotil helps but not everyday. \par She is on 60,000 units of procrit weekly and Revlimid 5 mg 2 weeks on 1 week off. \par \par 4/23/19\par Pt is here for follow up.\par She feels well.\par The nasal sores have improved with bactroban\par The abscess on left shoulder has resolved.\par However she has a new one on the middle of her back.\par No fever.\par Pt has been on procrit 27024 units weekly, however she missed a dose in between.\par \par 5/8/19\par pt is here for follow up.\par no new complaints.\par feels well.\par Her skin abscess has resolved.\par she has been unable to go to ID, as she could not get an appt.\par No bleeding.\par She is compliant with revlimid.\par \par 6/6/19\par Pt is here for follow up.\par no new complaints \par Feels well.\par Is on week 2 of her Revlimid cycle. \par No transfusions since last visit\par \par 7/17/19\par Pt is here for follow up.\par C/O fatigue.\par Was away for a few days two weeks ago, but missed treatment with procrit for 2 weeks.\par Is complaint with Revlimid 2 weeks on 1 week off.\par Diarrhea is a little improved.\par \par \par 8/14/19\par Patient here for follow up visit, feeling well.  Although she is complaining of some pain at the left scapula area recently.  Patient denies cough, shortness of breath, denies fever, denies other bone pain.  She is off Revlimid  this week, due to restart on Monday.  She is scheduled for Procrit and Vitamin B12 injection today.  She plans to leave the country tomorrow to visit her mother who is ill.  She will return in about 10 days.\par \par 9/11/19\par Pt is here for follow up.\par She was away for 3 weeks, out of which she took procrit for 2 weeks in [place].\par She missed last week's dose.\par She had CBCs in [place] which showed Hgb of 8.9\par She feels well.\par She still getting diarrhea.\par She has been using lomotil with very minimal relief.\par She started a new cycle of Revlimid 4 days ago.\par \par 10/3/19\par Patient here for follow up visit, feeling fairly well.   Patient denies cough, shortness of breath, denies fever, denies other bone pain.  She remains on Revlimid.  She is scheduled for Procrit and Vitamin B12 injection today.\par \par 10/24/19\par Pt is here for follow up.\par Feels well.\par No SOB, fatigue.\par Compliant with procrit and Revl;imid.\par Remains on ASa.\par Diarrhea is unchanged.\par Pt takes imodium as needed.\par \par 11/14/19\par Pt is here for follow up.\par No new complaints.\par Starts a new cycle of Revlimid today.\par Diarrhea is same as before, no rectal bleeding.\par No fever.\par \par 12/5/19\par Pt is here for follow up.\par No new complaints.\par Denies feeling weaker than usual.\par No fever, SOB.\par No change in frequency of diarrhea.\par No pain.\par Will start next cycle of Revlimid in 3 days.\par \par \par !/16/20\par Pt was recently DCed from [facility] 3 days ago after being treated for pneumonia and MARCO.\par She is on po Levaquin.\par She restarted Revlmid 3 days ago.\par She is feeling better now. No fever.\par No SOB, Chest pain and back pain has resolved.\par Pt has a cough, no expectoration.\par She also recd a unit of PRBCs last week in the hospital\par \par \par 1/30/20\par Patient here for follow up visit, feeling well.  She has no new complaints. Cough is almost gone completely.  Patient denies shortness of breath, denies fever, denies bone pain.  Her appetite is ok, weight is stable.  She is due to restart Revlimid on Monday.\par \par 02/20/20\par Pt is here for follow up, feeling well.\par Offers no new complaints. Denies SOB, fever, chills, weight loss, CP, cough. \par Currently off week of Revlimid 5 mg. Restarts on Monday.\par Has procrit 80,000 units today. Next b 12 injection due in 2 weeks \par Did not get chest Xray\par CBC reviewed WBC 3.1, h/h 8.3/25%, plt 386, neutrophils 1.29\par \par 3/12/20\par Pt is here for follow up.\par She took Revlimid for 2 weeks and then has been off for one week although she was advised to take it daily without break.\par No SOB, Cough, fever.\par Has mild fatigue.\par \par 04/02/2020\par SIMONA KUHN a 75 year F is here today for follow up visit of MDS.\par Denies fever, chills, cough,night sweats, weight loss. \par Denies bleeding or bruising.\par Pt receives procrit 80,000 units weekly and B12 IM monthly. \par Continued Revlimid 5 mg daily x 3 weeks, has 1 dose left tonight. \par CBC reviewed: WBC 3.2, H/H 8.1/25.2, neutrophils 1.6, PLT 72\par \par 4/20/2020: The patient is here for follow up . She has no complaints of fever, chills, dizziness , chest pain and shortness of breath . She is still with diarrhea , up to 10 watery BMs per day, responds poorly to imodium and lomotil. She is on Revlimid 5 mg daily , took last dose yesterday night. Her last Procrit was on April 13. \par \par 5/26/20\par Pt is here for follow up.\par She is feeling well. Denies SOB, chest pain, fever.\par Continues to have diarrhea although she is off of Revlmid.\par Thinks it may due to diabetic meds.\par Wants to discuss BM bx results\par \par 6/22/20\par Pt is here for follow up.\par Feels well. Denies any fever, N, V, D\par Continues to have diarrhea, she will talk to her PCP about changing metformin for DM.\par Has been needing PRBCs 1 unit each month\par \par 7/20/20\par Pt is here for follow up.\par No new complaints. Has been feeling well.\par No SOB, CP. \par No N, V, D.\par She has recd 2 units of PRBCs since May 20.\par \par 8/17/20\par Pt is here for a follow up visit.\par She is feeling well.\par No new symptoms. No N, V, D.\par No bruising or bleeding. She continues to need PRBCs every 2-3 weeks.\par \par 8/31/20\par Pt is here for follow up. She is feeling well. No N, V, D.\par She is tolerating the hydrea well. No SOB, CP\par No bruising ior bleeding\par \par 9/8/20\par Pt is here for follow up.\par She had been contacted by the [facility] last week to advise her to stop the hydrea. Pt did not check her messages and continued with Hydrea.\par No fever, N, V, bleeding.\par Saw Dr Ferrell last week.\par \par 9/14/20\par Pt is here for folow up.\par Besides her usual complaint of diarrhea she is feeling well.\par Recd 1 unit PRBC last week.\par Has stopped Hydrea.\par No fever, mouth sores.\par \par 9/29/20\par Pt is here for folow up.\par No new complaints.\par Is seeing GI for diarrhea net week.\par No fever, night sweats.\par REcd 3 units of PRBC this month\par \par 10/13/20\par Pt is here for follow up.\par No new complaints.\par Has not needed a transfusion since 3 weeks ago.\par Continues to have diarrhea, waiting to be seen by GI\par \par 10/26/20\par Pt is here for follow up.\par C/O feeling fatigued.\par Has CLARK.\par No nausea or vomiting.\par No fever.\par Diarrhea has improved a little. She is no longer on Metformin [de-identified] : \par

## 2020-10-26 NOTE — PHYSICAL EXAM
[Restricted in physically strenuous activity but ambulatory and able to carry out work of a light or sedentary nature] : Status 1- Restricted in physically strenuous activity but ambulatory and able to carry out work of a light or sedentary nature, e.g., light house work, office work [Normal] : affect appropriate [de-identified] : comjunctival pallor

## 2020-10-27 ENCOUNTER — APPOINTMENT (OUTPATIENT)
Dept: INFUSION THERAPY | Facility: CLINIC | Age: 75
End: 2020-10-27

## 2020-10-27 LAB
ABO + RH PNL BLD: NORMAL
BLD GP AB SCN SERPL QL: NORMAL

## 2020-11-02 ENCOUNTER — LABORATORY RESULT (OUTPATIENT)
Age: 75
End: 2020-11-02

## 2020-11-02 ENCOUNTER — APPOINTMENT (OUTPATIENT)
Dept: INFUSION THERAPY | Facility: CLINIC | Age: 75
End: 2020-11-02

## 2020-11-02 LAB
HCT VFR BLD CALC: 22.2 %
HGB BLD-MCNC: 7.3 G/DL
MCHC RBC-ENTMCNC: 29.9 PG
MCHC RBC-ENTMCNC: 32.9 G/DL
MCV RBC AUTO: 91 FL
PLATELET # BLD AUTO: 331 K/UL
PMV BLD: 10.8 FL
RBC # BLD: 2.44 M/UL
RBC # FLD: 23.8 %
WBC # FLD AUTO: 1.8 K/UL

## 2020-11-02 RX ORDER — ERYTHROPOIETIN 10000 [IU]/ML
80000 INJECTION, SOLUTION INTRAVENOUS; SUBCUTANEOUS ONCE
Refills: 0 | Status: COMPLETED | OUTPATIENT
Start: 2020-11-02 | End: 2020-11-02

## 2020-11-02 RX ADMIN — ERYTHROPOIETIN 80000 UNIT(S): 10000 INJECTION, SOLUTION INTRAVENOUS; SUBCUTANEOUS at 11:51

## 2020-11-09 ENCOUNTER — APPOINTMENT (OUTPATIENT)
Dept: INFUSION THERAPY | Facility: CLINIC | Age: 75
End: 2020-11-09
Payer: MEDICARE

## 2020-11-09 ENCOUNTER — LABORATORY RESULT (OUTPATIENT)
Age: 75
End: 2020-11-09

## 2020-11-09 ENCOUNTER — APPOINTMENT (OUTPATIENT)
Dept: HEMATOLOGY ONCOLOGY | Facility: CLINIC | Age: 75
End: 2020-11-09
Payer: MEDICARE

## 2020-11-09 VITALS
HEART RATE: 96 BPM | HEIGHT: 62 IN | BODY MASS INDEX: 24.84 KG/M2 | WEIGHT: 135 LBS | DIASTOLIC BLOOD PRESSURE: 43 MMHG | SYSTOLIC BLOOD PRESSURE: 70 MMHG | TEMPERATURE: 98.3 F

## 2020-11-09 LAB
ALBUMIN SERPL ELPH-MCNC: 4 G/DL
ALP BLD-CCNC: 66 U/L
ALT SERPL-CCNC: 13 U/L
ANION GAP SERPL CALC-SCNC: 13 MMOL/L
AST SERPL-CCNC: 11 U/L
BILIRUB SERPL-MCNC: 0.5 MG/DL
BUN SERPL-MCNC: 35 MG/DL
CALCIUM SERPL-MCNC: 8.9 MG/DL
CHLORIDE SERPL-SCNC: 106 MMOL/L
CO2 SERPL-SCNC: 17 MMOL/L
CREAT SERPL-MCNC: 1.4 MG/DL
GLUCOSE SERPL-MCNC: 360 MG/DL
HCT VFR BLD CALC: 20.5 %
HGB BLD-MCNC: 6.7 G/DL
IRON SATN MFR SERPL: 34 %
IRON SERPL-MCNC: 78 UG/DL
MCHC RBC-ENTMCNC: 31.2 PG
MCHC RBC-ENTMCNC: 32.7 G/DL
MCV RBC AUTO: 95.3 FL
PLATELET # BLD AUTO: 1091 K/UL
PMV BLD: 9.8 FL
POTASSIUM SERPL-SCNC: 5.4 MMOL/L
PROT SERPL-MCNC: 5.8 G/DL
RBC # BLD: 2.15 M/UL
RBC # FLD: 26.4 %
SODIUM SERPL-SCNC: 136 MMOL/L
TIBC SERPL-MCNC: 228 UG/DL
UIBC SERPL-MCNC: 150 UG/DL
WBC # FLD AUTO: 2.63 K/UL

## 2020-11-09 PROCEDURE — 99215 OFFICE O/P EST HI 40 MIN: CPT

## 2020-11-09 RX ORDER — ERYTHROPOIETIN 10000 [IU]/ML
80000 INJECTION, SOLUTION INTRAVENOUS; SUBCUTANEOUS ONCE
Refills: 0 | Status: COMPLETED | OUTPATIENT
Start: 2020-11-09 | End: 2020-11-09

## 2020-11-09 RX ORDER — AZACITIDINE FOR 100 MG/1
123 INJECTION, POWDER, LYOPHILIZED, FOR SOLUTION INTRAVENOUS; SUBCUTANEOUS ONCE
Refills: 0 | Status: COMPLETED | OUTPATIENT
Start: 2020-11-09 | End: 2020-11-09

## 2020-11-09 RX ORDER — ONDANSETRON 8 MG/1
8 TABLET, FILM COATED ORAL ONCE
Refills: 0 | Status: COMPLETED | OUTPATIENT
Start: 2020-11-09 | End: 2020-11-09

## 2020-11-09 RX ADMIN — ONDANSETRON 8 MILLIGRAM(S): 8 TABLET, FILM COATED ORAL at 13:32

## 2020-11-09 RX ADMIN — AZACITIDINE FOR 123 MILLIGRAM(S): 100 INJECTION, POWDER, LYOPHILIZED, FOR SOLUTION INTRAVENOUS; SUBCUTANEOUS at 14:03

## 2020-11-09 RX ADMIN — ERYTHROPOIETIN 80000 UNIT(S): 10000 INJECTION, SOLUTION INTRAVENOUS; SUBCUTANEOUS at 13:32

## 2020-11-09 NOTE — ASSESSMENT
[FreeTextEntry1] : # Lowrisk myelodysplastic syndrome, previously treated with  Revlimid and Procrit, now s/p 5 cycles of Vidaza which she tolerated well. 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 \par \par # Vitamin  B12 deficiency.\par \par # Thrombocytosis; continue ASA. \par Hold Hydrea for now,. CBC weekly, may need  blood transfusion.\par \par # Hyperkalemia likely due to increased plts; pseudohyperkalemia: \par \par Plan:\par Pt is s/p  Cycle # 7 of Vidaza.\par Will proceed with cycle no 8 of Vidaza sc daily x 7 days Q 28 days..\par SE discussed.\par Her Hgb is low today with no worsening of  symptoms.\par Pt wants to hold off on PRBC transfusion.\par Will check CBC in 3 days if Hgb < 6.5 or pt is symptomatic will transfuse 1 unit PRBCs.\par \par Continue with Procrit 80,000 units weekly.\par Continue B12 injections monthly. \par \par \par Will monitor pt with  frequent CBCs.\par \par The antineoplastic therapy dose was adjusted according to pt's labs and anticipated tolerance.\par The high risk of complications and complexity associated with antineoplastic therapy administration has been explained to the pt and family.\par Treatment will be administered under my supervision.\par \par If need for PRBCs worsens will start Luspatercept.\par \par Informed pt of my discussion with Dr Ferrell who suggested to complete at least 6 cycles of Vidaza before switching treatment. For thrombocytosis advised ASA only without added Hydrea.\par \par RTC in 2 weeks, \par

## 2020-11-09 NOTE — HISTORY OF PRESENT ILLNESS
[de-identified] : She missed on appointment for procrit last week.\par She starts her next cycle on 6/25/18\par C/o back pain radiating down her left which occurred when she bent to  something.\par No change in diarrhea.\par \par 7/31/18:\par Doing well. On Revlimid for more than 2yrs, 5mg 2weeks on 1 week off. She will be starting week on tonight. \par C/o diarrhea. On Imodium 2 pills AM and 2 pills PM. Doesn't help much with diarrhea. \par C/o nausea, abd pain. \par Colonoscopy in 2016 was normal. \par Did not have blood transfusion for over 2 years. \par On procrit [id number] weekly. Missed last week on 7/24/18 as she was out of town. She has been non compliant with weekly Procrit.\par Mammogram in 2017 was normal. \par \par 9/11/18\par pt is here for follow up.\par She feels the lomotil helps with the diarrhea.\par She was away for a few weeks and missed her procrit injections.\par No fever, abdominal  discomfort has been less since since use of lomotil.\par She started a new cycle 2 days ago.\par \par 10/2/18:\par She will start Revlimid tonight (week on). 2 weeks on, 1 week off. \par On ASA 81mg. \par Denies fever, nausea, vomiting, chest pain, SOB, abdominal pain, and bladder problems.\par C/o diarrhea. \par S/p 2 units PRBC transfusion on 9/25/18. \par On Procrit 53627 units weekly. Not compliant every week. \par C/o intermittent dizziness. \par \par 10/31/18\par Pt is here for follow up.\par C/O significant fatigue.\par Continues to have diarrhea, takes imodium and lomotil as needed.\par C/o dry cough, no fever.\par Cough started a month ago. It is worse in the mornings however over last week it has been constant all day.\par No chest pain.\par She was found to have low B12 levels and was started on B12 injections.\par \par 11/27/18:\par Doing well. Hospitalized for 3 days for cellulitis/abcess of the back s/p drainage and on Abx currently. Developed 3 days after Mg infusion as per pt. \par Denies nausea, vomiting, chest pain, SOB, abdominal pain, bowel and bladder problems.\par This is the week on Revlimid (week 1).\par S/p 1 unit PRBC transfusion in the hospital. \par On Procrit weekly \par \par 12/27/18:\par Doing well. No major complaints.\par Denies fever, nausea, vomiting, chest pain, SOB, abdominal pain, and bladder problems.\par On Revlimid 5 mg 2 weeks on 1 week off. This is the week off. She will start the week on sunday (12/30/18).\par Due for Procrit 06874 units today. \par Still has diarrhea. 4-5bm/day.\par On monthly B12 injections. \par \par 1/30/19:\par Patient here for follow up for MDS.  She is taking Revlimid 5 mg, 2 weeks on, 1 week off.  She will complete this current cycle on Saturday.  She has no new complaints today.  Patient denies cough, shortness of breath, denies fever, denies bone pain.\par \par 03/04/2019\par Patient is here for a follow up visit. She complaints of severe pain in her left shoulder and has had abscess drained 2 weeks ago. However the pain is worse and she has purulent discharge on examination. She also has Nasal sores that are painful. Culture from 11/2018 showed MRSA.  Denies Fever. \par She also complaints of swelling in her right leg. Not tender and not erythematous and negative Gong;s sign. \par Her diarrhea with Revlimid is ongoing and Imodium and Lomotil helps but not everyday. \par She is on 60,000 units of procrit weekly and Revlimid 5 mg 2 weeks on 1 week off. \par \par 4/23/19\par Pt is here for follow up.\par She feels well.\par The nasal sores have improved with bactroban\par The abscess on left shoulder has resolved.\par However she has a new one on the middle of her back.\par No fever.\par Pt has been on procrit 94832 units weekly, however she missed a dose in between.\par \par 5/8/19\par pt is here for follow up.\par no new complaints.\par feels well.\par Her skin abscess has resolved.\par she has been unable to go to ID, as she could not get an appt.\par No bleeding.\par She is compliant with revlimid.\par \par 6/6/19\par Pt is here for follow up.\par no new complaints \par Feels well.\par Is on week 2 of her Revlimid cycle. \par No transfusions since last visit\par \par 7/17/19\par Pt is here for follow up.\par C/O fatigue.\par Was away for a few days two weeks ago, but missed treatment with procrit for 2 weeks.\par Is complaint with Revlimid 2 weeks on 1 week off.\par Diarrhea is a little improved.\par \par \par 8/14/19\par Patient here for follow up visit, feeling well.  Although she is complaining of some pain at the left scapula area recently.  Patient denies cough, shortness of breath, denies fever, denies other bone pain.  She is off Revlimid  this week, due to restart on Monday.  She is scheduled for Procrit and Vitamin B12 injection today.  She plans to leave the country tomorrow to visit her mother who is ill.  She will return in about 10 days.\par \par 9/11/19\par Pt is here for follow up.\par She was away for 3 weeks, out of which she took procrit for 2 weeks in [place].\par She missed last week's dose.\par She had CBCs in [place] which showed Hgb of 8.9\par She feels well.\par She still getting diarrhea.\par She has been using lomotil with very minimal relief.\par She started a new cycle of Revlimid 4 days ago.\par \par 10/3/19\par Patient here for follow up visit, feeling fairly well.   Patient denies cough, shortness of breath, denies fever, denies other bone pain.  She remains on Revlimid.  She is scheduled for Procrit and Vitamin B12 injection today.\par \par 10/24/19\par Pt is here for follow up.\par Feels well.\par No SOB, fatigue.\par Compliant with procrit and Revl;imid.\par Remains on ASa.\par Diarrhea is unchanged.\par Pt takes imodium as needed.\par \par 11/14/19\par Pt is here for follow up.\par No new complaints.\par Starts a new cycle of Revlimid today.\par Diarrhea is same as before, no rectal bleeding.\par No fever.\par \par 12/5/19\par Pt is here for follow up.\par No new complaints.\par Denies feeling weaker than usual.\par No fever, SOB.\par No change in frequency of diarrhea.\par No pain.\par Will start next cycle of Revlimid in 3 days.\par \par \par !/16/20\par Pt was recently DCed from [facility] 3 days ago after being treated for pneumonia and MARCO.\par She is on po Levaquin.\par She restarted Revlmid 3 days ago.\par She is feeling better now. No fever.\par No SOB, Chest pain and back pain has resolved.\par Pt has a cough, no expectoration.\par She also recd a unit of PRBCs last week in the hospital\par \par \par 1/30/20\par Patient here for follow up visit, feeling well.  She has no new complaints. Cough is almost gone completely.  Patient denies shortness of breath, denies fever, denies bone pain.  Her appetite is ok, weight is stable.  She is due to restart Revlimid on Monday.\par \par 02/20/20\par Pt is here for follow up, feeling well.\par Offers no new complaints. Denies SOB, fever, chills, weight loss, CP, cough. \par Currently off week of Revlimid 5 mg. Restarts on Monday.\par Has procrit 80,000 units today. Next b 12 injection due in 2 weeks \par Did not get chest Xray\par CBC reviewed WBC 3.1, h/h 8.3/25%, plt 386, neutrophils 1.29\par \par 3/12/20\par Pt is here for follow up.\par She took Revlimid for 2 weeks and then has been off for one week although she was advised to take it daily without break.\par No SOB, Cough, fever.\par Has mild fatigue.\par \par 04/02/2020\par SIMONA KUHN a 75 year F is here today for follow up visit of MDS.\par Denies fever, chills, cough,night sweats, weight loss. \par Denies bleeding or bruising.\par Pt receives procrit 80,000 units weekly and B12 IM monthly. \par Continued Revlimid 5 mg daily x 3 weeks, has 1 dose left tonight. \par CBC reviewed: WBC 3.2, H/H 8.1/25.2, neutrophils 1.6, PLT 72\par \par 4/20/2020: The patient is here for follow up . She has no complaints of fever, chills, dizziness , chest pain and shortness of breath . She is still with diarrhea , up to 10 watery BMs per day, responds poorly to imodium and lomotil. She is on Revlimid 5 mg daily , took last dose yesterday night. Her last Procrit was on April 13. \par \par 5/26/20\par Pt is here for follow up.\par She is feeling well. Denies SOB, chest pain, fever.\par Continues to have diarrhea although she is off of Revlmid.\par Thinks it may due to diabetic meds.\par Wants to discuss BM bx results\par \par 6/22/20\par Pt is here for follow up.\par Feels well. Denies any fever, N, V, D\par Continues to have diarrhea, she will talk to her PCP about changing metformin for DM.\par Has been needing PRBCs 1 unit each month\par \par 7/20/20\par Pt is here for follow up.\par No new complaints. Has been feeling well.\par No SOB, CP. \par No N, V, D.\par She has recd 2 units of PRBCs since May 20.\par \par 8/17/20\par Pt is here for a follow up visit.\par She is feeling well.\par No new symptoms. No N, V, D.\par No bruising or bleeding. She continues to need PRBCs every 2-3 weeks.\par \par 8/31/20\par Pt is here for follow up. She is feeling well. No N, V, D.\par She is tolerating the hydrea well. No SOB, CP\par No bruising ior bleeding\par \par 9/8/20\par Pt is here for follow up.\par She had been contacted by the [facility] last week to advise her to stop the hydrea. Pt did not check her messages and continued with Hydrea.\par No fever, N, V, bleeding.\par Saw Dr Ferrell last week.\par \par 9/14/20\par Pt is here for folow up.\par Besides her usual complaint of diarrhea she is feeling well.\par Recd 1 unit PRBC last week.\par Has stopped Hydrea.\par No fever, mouth sores.\par \par 9/29/20\par Pt is here for folow up.\par No new complaints.\par Is seeing GI for diarrhea net week.\par No fever, night sweats.\par REcd 3 units of PRBC this month\par \par 10/13/20\par Pt is here for follow up.\par No new complaints.\par Has not needed a transfusion since 3 weeks ago.\par Continues to have diarrhea, waiting to be seen by GI\par \par 10/26/20\par Pt is here for follow up.\par C/O feeling fatigued.\par Has CLARK.\par No nausea or vomiting.\par No fever.\par Diarrhea has improved a little. She is no longer on Metformin\par \par 11/9/20\par Pt is here for follow up.\par Feels well. Denies SOB, CP, fatigue\par  [de-identified] : \par

## 2020-11-09 NOTE — PHYSICAL EXAM
[Restricted in physically strenuous activity but ambulatory and able to carry out work of a light or sedentary nature] : Status 1- Restricted in physically strenuous activity but ambulatory and able to carry out work of a light or sedentary nature, e.g., light house work, office work [Normal] : affect appropriate [de-identified] : comjunctival pallor

## 2020-11-10 ENCOUNTER — APPOINTMENT (OUTPATIENT)
Dept: INFUSION THERAPY | Facility: CLINIC | Age: 75
End: 2020-11-10

## 2020-11-10 LAB — FERRITIN SERPL-MCNC: 1008 NG/ML

## 2020-11-10 RX ORDER — AZACITIDINE FOR 100 MG/1
123 INJECTION, POWDER, LYOPHILIZED, FOR SOLUTION INTRAVENOUS; SUBCUTANEOUS ONCE
Refills: 0 | Status: COMPLETED | OUTPATIENT
Start: 2020-11-10 | End: 2020-11-10

## 2020-11-10 RX ORDER — ONDANSETRON 8 MG/1
8 TABLET, FILM COATED ORAL ONCE
Refills: 0 | Status: COMPLETED | OUTPATIENT
Start: 2020-11-10 | End: 2020-11-10

## 2020-11-10 RX ADMIN — ONDANSETRON 8 MILLIGRAM(S): 8 TABLET, FILM COATED ORAL at 09:23

## 2020-11-10 RX ADMIN — AZACITIDINE FOR 123 MILLIGRAM(S): 100 INJECTION, POWDER, LYOPHILIZED, FOR SOLUTION INTRAVENOUS; SUBCUTANEOUS at 09:32

## 2020-11-11 ENCOUNTER — LABORATORY RESULT (OUTPATIENT)
Age: 75
End: 2020-11-11

## 2020-11-11 ENCOUNTER — APPOINTMENT (OUTPATIENT)
Dept: INFUSION THERAPY | Facility: CLINIC | Age: 75
End: 2020-11-11

## 2020-11-11 LAB
HCT VFR BLD CALC: 20.3 %
HGB BLD-MCNC: 6.5 G/DL
MCHC RBC-ENTMCNC: 31.3 PG
MCHC RBC-ENTMCNC: 32 G/DL
MCV RBC AUTO: 97.6 FL
PLATELET # BLD AUTO: 1212 K/UL
PMV BLD: 9.3 FL
RBC # BLD: 2.08 M/UL
RBC # FLD: 27.3 %
WBC # FLD AUTO: 2.55 K/UL

## 2020-11-11 RX ORDER — AZACITIDINE FOR 100 MG/1
123 INJECTION, POWDER, LYOPHILIZED, FOR SOLUTION INTRAVENOUS; SUBCUTANEOUS ONCE
Refills: 0 | Status: COMPLETED | OUTPATIENT
Start: 2020-11-11 | End: 2020-11-11

## 2020-11-11 RX ORDER — ONDANSETRON 8 MG/1
8 TABLET, FILM COATED ORAL ONCE
Refills: 0 | Status: COMPLETED | OUTPATIENT
Start: 2020-11-11 | End: 2020-11-11

## 2020-11-11 RX ADMIN — AZACITIDINE FOR 123 MILLIGRAM(S): 100 INJECTION, POWDER, LYOPHILIZED, FOR SOLUTION INTRAVENOUS; SUBCUTANEOUS at 10:53

## 2020-11-11 RX ADMIN — ONDANSETRON 8 MILLIGRAM(S): 8 TABLET, FILM COATED ORAL at 10:12

## 2020-11-12 ENCOUNTER — APPOINTMENT (OUTPATIENT)
Dept: INFUSION THERAPY | Facility: CLINIC | Age: 75
End: 2020-11-12

## 2020-11-12 LAB
ABO + RH PNL BLD: NORMAL
BLD GP AB SCN SERPL QL: NORMAL

## 2020-11-12 RX ORDER — ONDANSETRON 8 MG/1
8 TABLET, FILM COATED ORAL ONCE
Refills: 0 | Status: COMPLETED | OUTPATIENT
Start: 2020-11-12 | End: 2020-11-12

## 2020-11-12 RX ORDER — AZACITIDINE FOR 100 MG/1
123 INJECTION, POWDER, LYOPHILIZED, FOR SOLUTION INTRAVENOUS; SUBCUTANEOUS ONCE
Refills: 0 | Status: COMPLETED | OUTPATIENT
Start: 2020-11-12 | End: 2020-11-12

## 2020-11-12 RX ADMIN — AZACITIDINE FOR 123 MILLIGRAM(S): 100 INJECTION, POWDER, LYOPHILIZED, FOR SOLUTION INTRAVENOUS; SUBCUTANEOUS at 11:23

## 2020-11-12 RX ADMIN — ONDANSETRON 8 MILLIGRAM(S): 8 TABLET, FILM COATED ORAL at 09:51

## 2020-11-13 ENCOUNTER — APPOINTMENT (OUTPATIENT)
Dept: INFUSION THERAPY | Facility: CLINIC | Age: 75
End: 2020-11-13

## 2020-11-13 RX ORDER — AZACITIDINE FOR 100 MG/1
123 INJECTION, POWDER, LYOPHILIZED, FOR SOLUTION INTRAVENOUS; SUBCUTANEOUS ONCE
Refills: 0 | Status: COMPLETED | OUTPATIENT
Start: 2020-11-13 | End: 2020-11-13

## 2020-11-13 RX ORDER — ONDANSETRON 8 MG/1
8 TABLET, FILM COATED ORAL ONCE
Refills: 0 | Status: COMPLETED | OUTPATIENT
Start: 2020-11-13 | End: 2020-11-13

## 2020-11-13 RX ADMIN — AZACITIDINE FOR 123 MILLIGRAM(S): 100 INJECTION, POWDER, LYOPHILIZED, FOR SOLUTION INTRAVENOUS; SUBCUTANEOUS at 10:20

## 2020-11-13 RX ADMIN — ONDANSETRON 8 MILLIGRAM(S): 8 TABLET, FILM COATED ORAL at 09:14

## 2020-11-16 ENCOUNTER — LABORATORY RESULT (OUTPATIENT)
Age: 75
End: 2020-11-16

## 2020-11-16 ENCOUNTER — APPOINTMENT (OUTPATIENT)
Dept: INFUSION THERAPY | Facility: CLINIC | Age: 75
End: 2020-11-16

## 2020-11-16 LAB
HCT VFR BLD CALC: 25.3 %
HGB BLD-MCNC: 8.3 G/DL
MCHC RBC-ENTMCNC: 30.5 PG
MCHC RBC-ENTMCNC: 32.8 G/DL
MCV RBC AUTO: 93 FL
PLATELET # BLD AUTO: 1431 K/UL
PMV BLD: 9.3 FL
RBC # BLD: 2.72 M/UL
RBC # FLD: 24.6 %
WBC # FLD AUTO: 4.05 K/UL

## 2020-11-16 RX ORDER — PREGABALIN 225 MG/1
1000 CAPSULE ORAL ONCE
Refills: 0 | Status: COMPLETED | OUTPATIENT
Start: 2020-11-16 | End: 2020-11-16

## 2020-11-16 RX ORDER — ONDANSETRON 8 MG/1
8 TABLET, FILM COATED ORAL ONCE
Refills: 0 | Status: COMPLETED | OUTPATIENT
Start: 2020-11-16 | End: 2020-11-16

## 2020-11-16 RX ORDER — ERYTHROPOIETIN 10000 [IU]/ML
80000 INJECTION, SOLUTION INTRAVENOUS; SUBCUTANEOUS ONCE
Refills: 0 | Status: COMPLETED | OUTPATIENT
Start: 2020-11-16 | End: 2020-11-16

## 2020-11-16 RX ORDER — AZACITIDINE FOR 100 MG/1
123 INJECTION, POWDER, LYOPHILIZED, FOR SOLUTION INTRAVENOUS; SUBCUTANEOUS ONCE
Refills: 0 | Status: COMPLETED | OUTPATIENT
Start: 2020-11-16 | End: 2020-11-16

## 2020-11-16 RX ORDER — ACETAMINOPHEN 500 MG
650 TABLET ORAL ONCE
Refills: 0 | Status: COMPLETED | OUTPATIENT
Start: 2020-11-16 | End: 2020-11-16

## 2020-11-16 RX ADMIN — PREGABALIN 1000 MICROGRAM(S): 225 CAPSULE ORAL at 12:26

## 2020-11-16 RX ADMIN — Medication 650 MILLIGRAM(S): at 12:35

## 2020-11-16 RX ADMIN — Medication 650 MILLIGRAM(S): at 13:48

## 2020-11-16 RX ADMIN — ONDANSETRON 8 MILLIGRAM(S): 8 TABLET, FILM COATED ORAL at 12:24

## 2020-11-16 RX ADMIN — AZACITIDINE FOR 123 MILLIGRAM(S): 100 INJECTION, POWDER, LYOPHILIZED, FOR SOLUTION INTRAVENOUS; SUBCUTANEOUS at 13:48

## 2020-11-16 RX ADMIN — ERYTHROPOIETIN 80000 UNIT(S): 10000 INJECTION, SOLUTION INTRAVENOUS; SUBCUTANEOUS at 12:25

## 2020-11-17 ENCOUNTER — APPOINTMENT (OUTPATIENT)
Dept: INFUSION THERAPY | Facility: CLINIC | Age: 75
End: 2020-11-17

## 2020-11-17 RX ORDER — AZACITIDINE FOR 100 MG/1
123 INJECTION, POWDER, LYOPHILIZED, FOR SOLUTION INTRAVENOUS; SUBCUTANEOUS ONCE
Refills: 0 | Status: COMPLETED | OUTPATIENT
Start: 2020-11-17 | End: 2020-11-17

## 2020-11-17 RX ORDER — ONDANSETRON 8 MG/1
8 TABLET, FILM COATED ORAL ONCE
Refills: 0 | Status: COMPLETED | OUTPATIENT
Start: 2020-11-17 | End: 2020-11-17

## 2020-11-17 RX ADMIN — AZACITIDINE FOR 123 MILLIGRAM(S): 100 INJECTION, POWDER, LYOPHILIZED, FOR SOLUTION INTRAVENOUS; SUBCUTANEOUS at 12:48

## 2020-11-17 RX ADMIN — ONDANSETRON 8 MILLIGRAM(S): 8 TABLET, FILM COATED ORAL at 12:16

## 2020-11-23 ENCOUNTER — LABORATORY RESULT (OUTPATIENT)
Age: 75
End: 2020-11-23

## 2020-11-23 ENCOUNTER — APPOINTMENT (OUTPATIENT)
Dept: INFUSION THERAPY | Facility: CLINIC | Age: 75
End: 2020-11-23

## 2020-11-23 LAB
HCT VFR BLD CALC: 25.8 %
HGB BLD-MCNC: 8.3 G/DL
MCHC RBC-ENTMCNC: 30.5 PG
MCHC RBC-ENTMCNC: 32.2 G/DL
MCV RBC AUTO: 94.9 FL
PLATELET # BLD AUTO: 973 K/UL
PMV BLD: 9.4 FL
RBC # BLD: 2.72 M/UL
RBC # FLD: 24.4 %
WBC # FLD AUTO: 5.5 K/UL

## 2020-11-23 RX ORDER — ERYTHROPOIETIN 10000 [IU]/ML
80000 INJECTION, SOLUTION INTRAVENOUS; SUBCUTANEOUS ONCE
Refills: 0 | Status: COMPLETED | OUTPATIENT
Start: 2020-11-23 | End: 2020-11-23

## 2020-11-23 RX ADMIN — ERYTHROPOIETIN 80000 UNIT(S): 10000 INJECTION, SOLUTION INTRAVENOUS; SUBCUTANEOUS at 11:49

## 2020-11-30 ENCOUNTER — APPOINTMENT (OUTPATIENT)
Dept: INFUSION THERAPY | Facility: CLINIC | Age: 75
End: 2020-11-30

## 2020-11-30 ENCOUNTER — LABORATORY RESULT (OUTPATIENT)
Age: 75
End: 2020-11-30

## 2020-11-30 RX ORDER — ERYTHROPOIETIN 10000 [IU]/ML
80000 INJECTION, SOLUTION INTRAVENOUS; SUBCUTANEOUS ONCE
Refills: 0 | Status: COMPLETED | OUTPATIENT
Start: 2020-11-30 | End: 2020-11-30

## 2020-11-30 RX ADMIN — ERYTHROPOIETIN 80000 UNIT(S): 10000 INJECTION, SOLUTION INTRAVENOUS; SUBCUTANEOUS at 12:01

## 2020-12-01 LAB
HCT VFR BLD CALC: 26.8 %
HGB BLD-MCNC: 8.5 G/DL
MCHC RBC-ENTMCNC: 30.8 PG
MCHC RBC-ENTMCNC: 31.7 G/DL
MCV RBC AUTO: 97.1 FL
PLATELET # BLD AUTO: 595 K/UL
PMV BLD: 9.8 FL
RBC # BLD: 2.76 M/UL
RBC # FLD: 24.8 %
WBC # FLD AUTO: 3.71 K/UL

## 2020-12-07 ENCOUNTER — APPOINTMENT (OUTPATIENT)
Dept: HEMATOLOGY ONCOLOGY | Facility: CLINIC | Age: 75
End: 2020-12-07
Payer: MEDICARE

## 2020-12-07 ENCOUNTER — APPOINTMENT (OUTPATIENT)
Dept: INFUSION THERAPY | Facility: CLINIC | Age: 75
End: 2020-12-07
Payer: MEDICARE

## 2020-12-07 ENCOUNTER — LABORATORY RESULT (OUTPATIENT)
Age: 75
End: 2020-12-07

## 2020-12-07 VITALS
DIASTOLIC BLOOD PRESSURE: 55 MMHG | HEART RATE: 91 BPM | HEIGHT: 62 IN | SYSTOLIC BLOOD PRESSURE: 126 MMHG | WEIGHT: 132 LBS | RESPIRATION RATE: 16 BRPM | BODY MASS INDEX: 24.29 KG/M2 | TEMPERATURE: 97.6 F

## 2020-12-07 PROCEDURE — 99214 OFFICE O/P EST MOD 30 MIN: CPT

## 2020-12-07 RX ORDER — ERYTHROPOIETIN 10000 [IU]/ML
80000 INJECTION, SOLUTION INTRAVENOUS; SUBCUTANEOUS ONCE
Refills: 0 | Status: COMPLETED | OUTPATIENT
Start: 2020-12-07 | End: 2020-12-07

## 2020-12-07 RX ADMIN — ERYTHROPOIETIN 80000 UNIT(S): 10000 INJECTION, SOLUTION INTRAVENOUS; SUBCUTANEOUS at 14:56

## 2020-12-07 NOTE — HISTORY OF PRESENT ILLNESS
[de-identified] : She missed on appointment for procrit last week.\par She starts her next cycle on 6/25/18\par C/o back pain radiating down her left which occurred when she bent to  something.\par No change in diarrhea.\par \par 7/31/18:\par Doing well. On Revlimid for more than 2yrs, 5mg 2weeks on 1 week off. She will be starting week on tonight. \par C/o diarrhea. On Imodium 2 pills AM and 2 pills PM. Doesn't help much with diarrhea. \par C/o nausea, abd pain. \par \par \par Colonoscopy in 2016 was normal. \par Did not have blood transfusion for over 2 years. \par On procrit [id number] weekly. Missed last week on 7/24/18 as she was out of town. She has been non compliant with weekly Procrit.\par Mammogram in 2017 was normal. \par \par 9/11/18\par pt is here for follow up.\par She feels the lomotil helps with the diarrhea.\par She was away for a few weeks and missed her procrit injections.\par No fever, abdominal  discomfort has been less since since use of lomotil.\par She started a new cycle 2 days ago.\par \par 10/2/18:\par She will start Revlimid tonight (week on). 2 weeks on, 1 week off. \par On ASA 81mg. \par Denies fever, nausea, vomiting, chest pain, SOB, abdominal pain, and bladder problems.\par C/o diarrhea. \par S/p 2 units PRBC transfusion on 9/25/18. \par On Procrit 91218 units weekly. Not compliant every week. \par C/o intermittent dizziness. \par \par 10/31/18\par Pt is here for follow up.\par C/O significant fatigue.\par Continues to have diarrhea, takes imodium and lomotil as needed.\par C/o dry cough, no fever.\par Cough started a month ago. It is worse in the mornings however over last week it has been constant all day.\par No chest pain.\par She was found to have low B12 levels and was started on B12 injections.\par \par 11/27/18:\par Doing well. Hospitalized for 3 days for cellulitis/abcess of the back s/p drainage and on Abx currently. Developed 3 days after Mg infusion as per pt. \par Denies nausea, vomiting, chest pain, SOB, abdominal pain, bowel and bladder problems.\par This is the week on Revlimid (week 1).\par S/p 1 unit PRBC transfusion in the hospital. \par On Procrit weekly \par \par 12/27/18:\par Doing well. No major complaints.\par Denies fever, nausea, vomiting, chest pain, SOB, abdominal pain, and bladder problems.\par On Revlimid 5 mg 2 weeks on 1 week off. This is the week off. She will start the week on sunday (12/30/18).\par Due for Procrit 76130 units today. \par Still has diarrhea. 4-5bm/day.\par On monthly B12 injections. \par \par 1/30/19:\par Patient here for follow up for MDS.  She is taking Revlimid 5 mg, 2 weeks on, 1 week off.  She will complete this current cycle on Saturday.  She has no new complaints today.  Patient denies cough, shortness of breath, denies fever, denies bone pain.\par \par 03/04/2019\par Patient is here for a follow up visit. She complaints of severe pain in her left shoulder and has had abscess drained 2 weeks ago. However the pain is worse and she has purulent discharge on examination. She also has Nasal sores that are painful. Culture from 11/2018 showed MRSA.  Denies Fever. \par She also complaints of swelling in her right leg. Not tender and not erythematous and negative Gong;s sign. \par Her diarrhea with Revlimid is ongoing and Imodium and Lomotil helps but not everyday. \par She is on 60,000 units of procrit weekly and Revlimid 5 mg 2 weeks on 1 week off. \par \par 4/23/19\par Pt is here for follow up.\par She feels well.\par The nasal sores have improved with bactroban\par The abscess on left shoulder has resolved.\par However she has a new one on the middle of her back.\par No fever.\par Pt has been on procrit 03907 units weekly, however she missed a dose in between.\par \par 5/8/19\par pt is here for follow up.\par no new complaints.\par feels well.\par Her skin abscess has resolved.\par she has been unable to go to ID, as she could not get an appt.\par No bleeding.\par She is compliant with revlimid.\par \par 6/6/19\par Pt is here for follow up.\par no new complaints \par Feels well.\par Is on week 2 of her Revlimid cycle. \par No transfusions since last visit\par \par 7/17/19\par Pt is here for follow up.\par C/O fatigue.\par Was away for a few days two weeks ago, but missed treatment with procrit for 2 weeks.\par Is complaint with Revlimid 2 weeks on 1 week off.\par Diarrhea is a little improved.\par \par \par 8/14/19\par Patient here for follow up visit, feeling well.  Although she is complaining of some pain at the left scapula area recently.  Patient denies cough, shortness of breath, denies fever, denies other bone pain.  She is off Revlimid  this week, due to restart on Monday.  She is scheduled for Procrit and Vitamin B12 injection today.  She plans to leave the country tomorrow to visit her mother who is ill.  She will return in about 10 days.\par \par 9/11/19\par Pt is here for follow up.\par She was away for 3 weeks, out of which she took procrit for 2 weeks in [place].\par She missed last week's dose.\par She had CBCs in [place] which showed Hgb of 8.9\par She feels well.\par She still getting diarrhea.\par She has been using lomotil with very minimal relief.\par She started a new cycle of Revlimid 4 days ago.\par \par 10/3/19\par Patient here for follow up visit, feeling fairly well.   Patient denies cough, shortness of breath, denies fever, denies other bone pain.  She remains on Revlimid.  She is scheduled for Procrit and Vitamin B12 injection today.\par \par 10/24/19\par Pt is here for follow up.\par Feels well.\par No SOB, fatigue.\par Compliant with procrit and Revl;imid.\par Remains on ASa.\par Diarrhea is unchanged.\par Pt takes imodium as needed.\par \par 11/14/19\par Pt is here for follow up.\par No new complaints.\par Starts a new cycle of Revlimid today.\par Diarrhea is same as before, no rectal bleeding.\par No fever.\par \par 12/5/19\par Pt is here for follow up.\par No new complaints.\par Denies feeling weaker than usual.\par No fever, SOB.\par No change in frequency of diarrhea.\par No pain.\par Will start next cycle of Revlimid in 3 days.\par \par \par !/16/20\par Pt was recently DCed from [facility] 3 days ago after being treated for pneumonia and MARCO.\par She is on po Levaquin.\par She restarted Revlmid 3 days ago.\par She is feeling better now. No fever.\par No SOB, Chest pain and back pain has resolved.\par Pt has a cough, no expectoration.\par She also recd a unit of PRBCs last week in the hospital\par \par \par 1/30/20\par Patient here for follow up visit, feeling well.  She has no new complaints. Cough is almost gone completely.  Patient denies shortness of breath, denies fever, denies bone pain.  Her appetite is ok, weight is stable.  She is due to restart Revlimid on Monday.\par \par 02/20/20\par Pt is here for follow up, feeling well.\par Offers no new complaints. Denies SOB, fever, chills, weight loss, CP, cough. \par Currently off week of Revlimid 5 mg. Restarts on Monday.\par Has procrit 80,000 units today. Next b 12 injection due in 2 weeks \par Did not get chest Xray\par CBC reviewed WBC 3.1, h/h 8.3/25%, plt 386, neutrophils 1.29\par \par 3/12/20\par Pt is here for follow up.\par She took Revlimid for 2 weeks and then has been off for one week although she was advised to take it daily without break.\par No SOB, Cough, fever.\par Has mild fatigue.\par \par 04/02/2020\par SIMONA KUHN a 75 year F is here today for follow up visit of MDS.\par Denies fever, chills, cough,night sweats, weight loss. \par Denies bleeding or bruising.\par Pt receives procrit 80,000 units weekly and B12 IM monthly. \par Continued Revlimid 5 mg daily x 3 weeks, has 1 dose left tonight. \par CBC reviewed: WBC 3.2, H/H 8.1/25.2, neutrophils 1.6, PLT 72\par \par 4/20/2020: The patient is here for follow up . She has no complaints of fever, chills, dizziness , chest pain and shortness of breath . She is still with diarrhea , up to 10 watery BMs per day, responds poorly to imodium and lomotil. She is on Revlimid 5 mg daily , took last dose yesterday night. Her last Procrit was on April 13. \par \par 5/26/20\par Pt is here for follow up.\par She is feeling well. Denies SOB, chest pain, fever.\par Continues to have diarrhea although she is off of Revlmid.\par Thinks it may due to diabetic meds.\par Wants to discuss BM bx results\par \par 6/22/20\par Pt is here for follow up.\par Feels well. Denies any fever, N, V, D\par Continues to have diarrhea, she will talk to her PCP about changing metformin for DM.\par Has been needing PRBCs 1 unit each month\par \par 7/20/20\par Pt is here for follow up.\par No new complaints. Has been feeling well.\par No SOB, CP. \par No N, V, D.\par She has recd 2 units of PRBCs since May 20.\par \par 8/17/20\par Pt is here for a follow up visit.\par She is feeling well.\par No new symptoms. No N, V, D.\par No bruising or bleeding. She continues to need PRBCs every 2-3 weeks.\par \par 8/31/20\par Pt is here for follow up. She is feeling well. No N, V, D.\par She is tolerating the hydrea well. No SOB, CP\par No bruising ior bleeding\par \par 9/8/20\par Pt is here for follow up.\par She had been contacted by the [facility] last week to advise her to stop the hydrea. Pt did not check her messages and continued with Hydrea.\par No fever, N, V, bleeding.\par Saw Dr Ferrell last week.\par \par 9/14/20\par Pt is here for folow up.\par Besides her usual complaint of diarrhea she is feeling well.\par Recd 1 unit PRBC last week.\par Has stopped Hydrea.\par No fever, mouth sores.\par \par 9/29/20\par Pt is here for folow up.\par No new complaints.\par Is seeing GI for diarrhea net week.\par No fever, night sweats.\par REcd 3 units of PRBC this month\par \par 10/13/20\par Pt is here for follow up.\par No new complaints.\par Has not needed a transfusion since 3 weeks ago.\par Continues to have diarrhea, waiting to be seen by GI\par \par 10/26/20\par Pt is here for follow up.\par C/O feeling fatigued.\par Has CLARK.\par No nausea or vomiting.\par No fever.\par Diarrhea has improved a little. She is no longer on Metformin\par \par 11/9/20\par Pt is here for follow up.\par Feels well. Denies SOB, CP, fatigue\par \par \par 12/7/20- Patient is for follow up and is due for cycle 9 of Vidaza.  She still has loose BM sometimes 10 times a day and was seeing Dr. Corey from GI- did not follow up recently and is unable to get an appt with him. She has lost about 10 lbs since September. No N/V. No fever or chills. No CP/SOB. She has been getting PRBC transfusion every 3 weeks now \par  [de-identified] : This is a 75 year old Black female with history of MDS. She's been on treatment with Revlimid 5 mg, 2 weeks on, 1 week off.  \par She is also on Procrit.\par  She underwent colonoscopy (2/2017)  Results noted no colitis, only hyperplastic polyp noted. \par  \par She has C/O abdominal pain and passage of mucus per rectum.\par She is planning to follow with her GI.\par Other than that she feels well.\par She is compliant with Revlimid .

## 2020-12-07 NOTE — PHYSICAL EXAM
[Restricted in physically strenuous activity but ambulatory and able to carry out work of a light or sedentary nature] : Status 1- Restricted in physically strenuous activity but ambulatory and able to carry out work of a light or sedentary nature, e.g., light house work, office work [Normal] : affect appropriate [de-identified] : conjunctival pallor

## 2020-12-07 NOTE — ASSESSMENT
[FreeTextEntry1] : # Lowrisk myelodysplastic syndrome, previously treated with  Revlimid and Procrit, now on Vidaza . 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 \par As per discussion with  Dr Ferrell who suggested to complete at least 6 cycles of Vidaza before switching treatment. For thrombocytosis advised ASA only without added Hydrea\par \par # Vitamin  B12 deficiency.\par \par # Thrombocytosis; continue ASA. \par Hold Hydrea for now,. CBC weekly\par \par # Hyperkalemia likely due to increased plts; pseudohyperkalemia: \par \par Plan:\par Patient has completed 8 cycles of Vidaza and she is requiring PRBC transfusion every 3 weeks now. \par Given the persistent transfusion dependence and also c/o diarrhea with Vidaza we discussed that we would change her treatment with Luspatercept. \par \par Will hold off on chemo with Vidaza this week \par Will arrange for treatment with Luspatercept 1mg/kg next week. \par \par Continue with Procrit 80,000 units for one dose until luspatercept is started . \par Continue B12 injections monthly. \par \par Monitor CBC weekly with transfusions as needed if Hb less than 7\par \par RTC next week for CBC check and Luspatercept. \par RTC in 4 weeks to see DR Wade. \par \par \par Patient was seen and examined and discussed with Dr. Moe\par

## 2020-12-07 NOTE — REVIEW OF SYSTEMS
[Diarrhea] : diarrhea [Negative] : Allergic/Immunologic [Recent Change In Weight] : ~T recent weight change [Shortness Of Breath] : no shortness of breath [FreeTextEntry2] : Lost 10 lbs since Sep

## 2020-12-08 ENCOUNTER — APPOINTMENT (OUTPATIENT)
Dept: INFUSION THERAPY | Facility: CLINIC | Age: 75
End: 2020-12-08

## 2020-12-08 LAB
ALBUMIN SERPL ELPH-MCNC: 4.5 G/DL
ALP BLD-CCNC: 53 U/L
ALT SERPL-CCNC: 11 U/L
ANION GAP SERPL CALC-SCNC: 12 MMOL/L
AST SERPL-CCNC: 9 U/L
BILIRUB DIRECT SERPL-MCNC: 0.2 MG/DL
BILIRUB INDIRECT SERPL-MCNC: 0.3 MG/DL
BILIRUB SERPL-MCNC: 0.5 MG/DL
BUN SERPL-MCNC: 38 MG/DL
CALCIUM SERPL-MCNC: 9.7 MG/DL
CHLORIDE SERPL-SCNC: 106 MMOL/L
CO2 SERPL-SCNC: 18 MMOL/L
CREAT SERPL-MCNC: 1.5 MG/DL
GLUCOSE SERPL-MCNC: 188 MG/DL
HCT VFR BLD CALC: 23 %
HGB BLD-MCNC: 7.6 G/DL
MAGNESIUM SERPL-MCNC: 1.8 MG/DL
MCHC RBC-ENTMCNC: 31.5 PG
MCHC RBC-ENTMCNC: 33 G/DL
MCV RBC AUTO: 95.4 FL
PLATELET # BLD AUTO: 608 K/UL
PMV BLD: 9.8 FL
POTASSIUM SERPL-SCNC: 5.4 MMOL/L
PROT SERPL-MCNC: 6.3 G/DL
RBC # BLD: 2.41 M/UL
RBC # FLD: 25.7 %
SODIUM SERPL-SCNC: 136 MMOL/L
WBC # FLD AUTO: 7.58 K/UL

## 2020-12-09 ENCOUNTER — APPOINTMENT (OUTPATIENT)
Dept: INFUSION THERAPY | Facility: CLINIC | Age: 75
End: 2020-12-09

## 2020-12-10 ENCOUNTER — APPOINTMENT (OUTPATIENT)
Dept: INFUSION THERAPY | Facility: CLINIC | Age: 75
End: 2020-12-10

## 2020-12-11 ENCOUNTER — APPOINTMENT (OUTPATIENT)
Dept: INFUSION THERAPY | Facility: CLINIC | Age: 75
End: 2020-12-11

## 2020-12-14 ENCOUNTER — APPOINTMENT (OUTPATIENT)
Dept: INFUSION THERAPY | Facility: CLINIC | Age: 75
End: 2020-12-14

## 2020-12-14 ENCOUNTER — LABORATORY RESULT (OUTPATIENT)
Age: 75
End: 2020-12-14

## 2020-12-14 LAB
HCT VFR BLD CALC: 22.9 %
HGB BLD-MCNC: 7.3 G/DL
MCHC RBC-ENTMCNC: 31.2 PG
MCHC RBC-ENTMCNC: 31.9 G/DL
MCV RBC AUTO: 97.9 FL
PLATELET # BLD AUTO: 1315 K/UL
PMV BLD: 10.2 FL
RBC # BLD: 2.34 M/UL
RBC # FLD: 26.5 %
WBC # FLD AUTO: 7.7 K/UL

## 2020-12-14 RX ORDER — ERYTHROPOIETIN 10000 [IU]/ML
80000 INJECTION, SOLUTION INTRAVENOUS; SUBCUTANEOUS ONCE
Refills: 0 | Status: COMPLETED | OUTPATIENT
Start: 2020-12-14 | End: 2020-12-14

## 2020-12-14 RX ORDER — PREGABALIN 225 MG/1
1000 CAPSULE ORAL ONCE
Refills: 0 | Status: COMPLETED | OUTPATIENT
Start: 2020-12-14 | End: 2020-12-14

## 2020-12-14 RX ORDER — LUSPATERCEPT 75 MG/1
60 INJECTION, POWDER, LYOPHILIZED, FOR SOLUTION SUBCUTANEOUS ONCE
Refills: 0 | Status: COMPLETED | OUTPATIENT
Start: 2020-12-14 | End: 2020-12-14

## 2020-12-14 RX ADMIN — PREGABALIN 1000 MICROGRAM(S): 225 CAPSULE ORAL at 13:18

## 2020-12-14 RX ADMIN — LUSPATERCEPT 60 MILLIGRAM(S): 75 INJECTION, POWDER, LYOPHILIZED, FOR SOLUTION SUBCUTANEOUS at 13:19

## 2020-12-14 RX ADMIN — ERYTHROPOIETIN 80000 UNIT(S): 10000 INJECTION, SOLUTION INTRAVENOUS; SUBCUTANEOUS at 13:18

## 2020-12-15 ENCOUNTER — APPOINTMENT (OUTPATIENT)
Dept: INFUSION THERAPY | Facility: CLINIC | Age: 75
End: 2020-12-15

## 2020-12-22 ENCOUNTER — APPOINTMENT (OUTPATIENT)
Dept: INFUSION THERAPY | Facility: CLINIC | Age: 75
End: 2020-12-22

## 2020-12-28 ENCOUNTER — APPOINTMENT (OUTPATIENT)
Dept: INFUSION THERAPY | Facility: CLINIC | Age: 75
End: 2020-12-28

## 2020-12-28 ENCOUNTER — LABORATORY RESULT (OUTPATIENT)
Age: 75
End: 2020-12-28

## 2020-12-28 LAB
ABO + RH PNL BLD: NORMAL
BLD GP AB SCN SERPL QL: NORMAL
HCT VFR BLD CALC: 18.9 %
HGB BLD-MCNC: 6.4 G/DL
MCHC RBC-ENTMCNC: 33.5 PG
MCHC RBC-ENTMCNC: 33.9 G/DL
MCV RBC AUTO: 99 FL
PLATELET # BLD AUTO: 646 K/UL
PMV BLD: 10 FL
RBC # BLD: 1.91 M/UL
RBC # FLD: NORMAL %
WBC # FLD AUTO: 7.11 K/UL

## 2020-12-28 RX ORDER — ERYTHROPOIETIN 10000 [IU]/ML
80000 INJECTION, SOLUTION INTRAVENOUS; SUBCUTANEOUS ONCE
Refills: 0 | Status: COMPLETED | OUTPATIENT
Start: 2020-12-28 | End: 2020-12-28

## 2020-12-28 RX ADMIN — ERYTHROPOIETIN 80000 UNIT(S): 10000 INJECTION, SOLUTION INTRAVENOUS; SUBCUTANEOUS at 11:44

## 2020-12-29 ENCOUNTER — OUTPATIENT (OUTPATIENT)
Dept: OUTPATIENT SERVICES | Facility: HOSPITAL | Age: 75
LOS: 1 days | Discharge: HOME | End: 2020-12-29

## 2020-12-29 ENCOUNTER — APPOINTMENT (OUTPATIENT)
Dept: INFUSION THERAPY | Facility: CLINIC | Age: 75
End: 2020-12-29

## 2020-12-29 DIAGNOSIS — Z79.899 OTHER LONG TERM (CURRENT) DRUG THERAPY: ICD-10-CM

## 2020-12-29 DIAGNOSIS — Z51.11 ENCOUNTER FOR ANTINEOPLASTIC CHEMOTHERAPY: ICD-10-CM

## 2020-12-29 DIAGNOSIS — D64.9 ANEMIA, UNSPECIFIED: ICD-10-CM

## 2020-12-29 DIAGNOSIS — E53.8 DEFICIENCY OF OTHER SPECIFIED B GROUP VITAMINS: ICD-10-CM

## 2020-12-29 DIAGNOSIS — D46.9 MYELODYSPLASTIC SYNDROME, UNSPECIFIED: ICD-10-CM

## 2020-12-29 DIAGNOSIS — N17.9 ACUTE KIDNEY FAILURE, UNSPECIFIED: ICD-10-CM

## 2021-01-04 ENCOUNTER — LABORATORY RESULT (OUTPATIENT)
Age: 76
End: 2021-01-04

## 2021-01-04 ENCOUNTER — APPOINTMENT (OUTPATIENT)
Dept: HEMATOLOGY ONCOLOGY | Facility: CLINIC | Age: 76
End: 2021-01-04
Payer: MEDICARE

## 2021-01-04 ENCOUNTER — APPOINTMENT (OUTPATIENT)
Dept: INFUSION THERAPY | Facility: CLINIC | Age: 76
End: 2021-01-04
Payer: MEDICARE

## 2021-01-04 VITALS
TEMPERATURE: 97.8 F | WEIGHT: 137 LBS | HEART RATE: 88 BPM | DIASTOLIC BLOOD PRESSURE: 55 MMHG | HEIGHT: 62 IN | BODY MASS INDEX: 25.21 KG/M2 | SYSTOLIC BLOOD PRESSURE: 93 MMHG

## 2021-01-04 PROCEDURE — 99215 OFFICE O/P EST HI 40 MIN: CPT

## 2021-01-04 RX ORDER — LUSPATERCEPT 75 MG/1
60 INJECTION, POWDER, LYOPHILIZED, FOR SOLUTION SUBCUTANEOUS ONCE
Refills: 0 | Status: COMPLETED | OUTPATIENT
Start: 2021-01-04 | End: 2021-01-04

## 2021-01-04 RX ORDER — ERYTHROPOIETIN 10000 [IU]/ML
80000 INJECTION, SOLUTION INTRAVENOUS; SUBCUTANEOUS ONCE
Refills: 0 | Status: COMPLETED | OUTPATIENT
Start: 2021-01-04 | End: 2021-01-04

## 2021-01-04 RX ADMIN — ERYTHROPOIETIN 80000 UNIT(S): 10000 INJECTION, SOLUTION INTRAVENOUS; SUBCUTANEOUS at 13:34

## 2021-01-04 RX ADMIN — LUSPATERCEPT 60 MILLIGRAM(S): 75 INJECTION, POWDER, LYOPHILIZED, FOR SOLUTION SUBCUTANEOUS at 13:34

## 2021-01-05 LAB
HCT VFR BLD CALC: 23.7 %
HGB BLD-MCNC: 7.7 G/DL
MCHC RBC-ENTMCNC: 32.5 G/DL
MCHC RBC-ENTMCNC: 33.5 PG
MCV RBC AUTO: 103 FL
PLATELET # BLD AUTO: 410 K/UL
PMV BLD: 10.4 FL
RBC # BLD: 2.3 M/UL
RBC # FLD: 23.2 %
WBC # FLD AUTO: 5.59 K/UL

## 2021-01-10 NOTE — REVIEW OF SYSTEMS
[Recent Change In Weight] : ~T recent weight change [Diarrhea] : diarrhea [Negative] : Allergic/Immunologic [Shortness Of Breath] : no shortness of breath [FreeTextEntry2] : Lost 10 lbs since Sep

## 2021-01-10 NOTE — PHYSICAL EXAM
[Restricted in physically strenuous activity but ambulatory and able to carry out work of a light or sedentary nature] : Status 1- Restricted in physically strenuous activity but ambulatory and able to carry out work of a light or sedentary nature, e.g., light house work, office work [Normal] : affect appropriate [de-identified] : conjunctival pallor

## 2021-01-10 NOTE — HISTORY OF PRESENT ILLNESS
[de-identified] : She missed on appointment for procrit last week.\par She starts her next cycle on 6/25/18\par C/o back pain radiating down her left which occurred when she bent to  something.\par No change in diarrhea.\par \par 7/31/18:\par Doing well. On Revlimid for more than 2yrs, 5mg 2weeks on 1 week off. She will be starting week on tonight. \par C/o diarrhea. On Imodium 2 pills AM and 2 pills PM. Doesn't help much with diarrhea. \par C/o nausea, abd pain. \par \par \par Colonoscopy in 2016 was normal. \par Did not have blood transfusion for over 2 years. \par On procrit [id number] weekly. Missed last week on 7/24/18 as she was out of town. She has been non compliant with weekly Procrit.\par Mammogram in 2017 was normal. \par \par 9/11/18\par pt is here for follow up.\par She feels the lomotil helps with the diarrhea.\par She was away for a few weeks and missed her procrit injections.\par No fever, abdominal  discomfort has been less since since use of lomotil.\par She started a new cycle 2 days ago.\par \par 10/2/18:\par She will start Revlimid tonight (week on). 2 weeks on, 1 week off. \par On ASA 81mg. \par Denies fever, nausea, vomiting, chest pain, SOB, abdominal pain, and bladder problems.\par C/o diarrhea. \par S/p 2 units PRBC transfusion on 9/25/18. \par On Procrit 23318 units weekly. Not compliant every week. \par C/o intermittent dizziness. \par \par 10/31/18\par Pt is here for follow up.\par C/O significant fatigue.\par Continues to have diarrhea, takes imodium and lomotil as needed.\par C/o dry cough, no fever.\par Cough started a month ago. It is worse in the mornings however over last week it has been constant all day.\par No chest pain.\par She was found to have low B12 levels and was started on B12 injections.\par \par 11/27/18:\par Doing well. Hospitalized for 3 days for cellulitis/abcess of the back s/p drainage and on Abx currently. Developed 3 days after Mg infusion as per pt. \par Denies nausea, vomiting, chest pain, SOB, abdominal pain, bowel and bladder problems.\par This is the week on Revlimid (week 1).\par S/p 1 unit PRBC transfusion in the hospital. \par On Procrit weekly \par \par 12/27/18:\par Doing well. No major complaints.\par Denies fever, nausea, vomiting, chest pain, SOB, abdominal pain, and bladder problems.\par On Revlimid 5 mg 2 weeks on 1 week off. This is the week off. She will start the week on sunday (12/30/18).\par Due for Procrit 30137 units today. \par Still has diarrhea. 4-5bm/day.\par On monthly B12 injections. \par \par 1/30/19:\par Patient here for follow up for MDS.  She is taking Revlimid 5 mg, 2 weeks on, 1 week off.  She will complete this current cycle on Saturday.  She has no new complaints today.  Patient denies cough, shortness of breath, denies fever, denies bone pain.\par \par 03/04/2019\par Patient is here for a follow up visit. She complaints of severe pain in her left shoulder and has had abscess drained 2 weeks ago. However the pain is worse and she has purulent discharge on examination. She also has Nasal sores that are painful. Culture from 11/2018 showed MRSA.  Denies Fever. \par She also complaints of swelling in her right leg. Not tender and not erythematous and negative Gong;s sign. \par Her diarrhea with Revlimid is ongoing and Imodium and Lomotil helps but not everyday. \par She is on 60,000 units of procrit weekly and Revlimid 5 mg 2 weeks on 1 week off. \par \par 4/23/19\par Pt is here for follow up.\par She feels well.\par The nasal sores have improved with bactroban\par The abscess on left shoulder has resolved.\par However she has a new one on the middle of her back.\par No fever.\par Pt has been on procrit 90861 units weekly, however she missed a dose in between.\par \par 5/8/19\par pt is here for follow up.\par no new complaints.\par feels well.\par Her skin abscess has resolved.\par she has been unable to go to ID, as she could not get an appt.\par No bleeding.\par She is compliant with revlimid.\par \par 6/6/19\par Pt is here for follow up.\par no new complaints \par Feels well.\par Is on week 2 of her Revlimid cycle. \par No transfusions since last visit\par \par 7/17/19\par Pt is here for follow up.\par C/O fatigue.\par Was away for a few days two weeks ago, but missed treatment with procrit for 2 weeks.\par Is complaint with Revlimid 2 weeks on 1 week off.\par Diarrhea is a little improved.\par \par \par 8/14/19\par Patient here for follow up visit, feeling well.  Although she is complaining of some pain at the left scapula area recently.  Patient denies cough, shortness of breath, denies fever, denies other bone pain.  She is off Revlimid  this week, due to restart on Monday.  She is scheduled for Procrit and Vitamin B12 injection today.  She plans to leave the country tomorrow to visit her mother who is ill.  She will return in about 10 days.\par \par 9/11/19\par Pt is here for follow up.\par She was away for 3 weeks, out of which she took procrit for 2 weeks in [place].\par She missed last week's dose.\par She had CBCs in [place] which showed Hgb of 8.9\par She feels well.\par She still getting diarrhea.\par She has been using lomotil with very minimal relief.\par She started a new cycle of Revlimid 4 days ago.\par \par 10/3/19\par Patient here for follow up visit, feeling fairly well.   Patient denies cough, shortness of breath, denies fever, denies other bone pain.  She remains on Revlimid.  She is scheduled for Procrit and Vitamin B12 injection today.\par \par 10/24/19\par Pt is here for follow up.\par Feels well.\par No SOB, fatigue.\par Compliant with procrit and Revl;imid.\par Remains on ASa.\par Diarrhea is unchanged.\par Pt takes imodium as needed.\par \par 11/14/19\par Pt is here for follow up.\par No new complaints.\par Starts a new cycle of Revlimid today.\par Diarrhea is same as before, no rectal bleeding.\par No fever.\par \par 12/5/19\par Pt is here for follow up.\par No new complaints.\par Denies feeling weaker than usual.\par No fever, SOB.\par No change in frequency of diarrhea.\par No pain.\par Will start next cycle of Revlimid in 3 days.\par \par \par !/16/20\par Pt was recently DCed from [facility] 3 days ago after being treated for pneumonia and MARCO.\par She is on po Levaquin.\par She restarted Revlmid 3 days ago.\par She is feeling better now. No fever.\par No SOB, Chest pain and back pain has resolved.\par Pt has a cough, no expectoration.\par She also recd a unit of PRBCs last week in the hospital\par \par \par 1/30/20\par Patient here for follow up visit, feeling well.  She has no new complaints. Cough is almost gone completely.  Patient denies shortness of breath, denies fever, denies bone pain.  Her appetite is ok, weight is stable.  She is due to restart Revlimid on Monday.\par \par 02/20/20\par Pt is here for follow up, feeling well.\par Offers no new complaints. Denies SOB, fever, chills, weight loss, CP, cough. \par Currently off week of Revlimid 5 mg. Restarts on Monday.\par Has procrit 80,000 units today. Next b 12 injection due in 2 weeks \par Did not get chest Xray\par CBC reviewed WBC 3.1, h/h 8.3/25%, plt 386, neutrophils 1.29\par \par 3/12/20\par Pt is here for follow up.\par She took Revlimid for 2 weeks and then has been off for one week although she was advised to take it daily without break.\par No SOB, Cough, fever.\par Has mild fatigue.\par \par 04/02/2020\par SIMONA KUHN a 75 year F is here today for follow up visit of MDS.\par Denies fever, chills, cough,night sweats, weight loss. \par Denies bleeding or bruising.\par Pt receives procrit 80,000 units weekly and B12 IM monthly. \par Continued Revlimid 5 mg daily x 3 weeks, has 1 dose left tonight. \par CBC reviewed: WBC 3.2, H/H 8.1/25.2, neutrophils 1.6, PLT 72\par \par 4/20/2020: The patient is here for follow up . She has no complaints of fever, chills, dizziness , chest pain and shortness of breath . She is still with diarrhea , up to 10 watery BMs per day, responds poorly to imodium and lomotil. She is on Revlimid 5 mg daily , took last dose yesterday night. Her last Procrit was on April 13. \par \par 5/26/20\par Pt is here for follow up.\par She is feeling well. Denies SOB, chest pain, fever.\par Continues to have diarrhea although she is off of Revlmid.\par Thinks it may due to diabetic meds.\par Wants to discuss BM bx results\par \par 6/22/20\par Pt is here for follow up.\par Feels well. Denies any fever, N, V, D\par Continues to have diarrhea, she will talk to her PCP about changing metformin for DM.\par Has been needing PRBCs 1 unit each month\par \par 7/20/20\par Pt is here for follow up.\par No new complaints. Has been feeling well.\par No SOB, CP. \par No N, V, D.\par She has recd 2 units of PRBCs since May 20.\par \par 8/17/20\par Pt is here for a follow up visit.\par She is feeling well.\par No new symptoms. No N, V, D.\par No bruising or bleeding. She continues to need PRBCs every 2-3 weeks.\par \par 8/31/20\par Pt is here for follow up. She is feeling well. No N, V, D.\par She is tolerating the hydrea well. No SOB, CP\par No bruising ior bleeding\par \par 9/8/20\par Pt is here for follow up.\par She had been contacted by the [facility] last week to advise her to stop the hydrea. Pt did not check her messages and continued with Hydrea.\par No fever, N, V, bleeding.\par Saw Dr Ferrell last week.\par \par 9/14/20\par Pt is here for folow up.\par Besides her usual complaint of diarrhea she is feeling well.\par Recd 1 unit PRBC last week.\par Has stopped Hydrea.\par No fever, mouth sores.\par \par 9/29/20\par Pt is here for folow up.\par No new complaints.\par Is seeing GI for diarrhea net week.\par No fever, night sweats.\par REcd 3 units of PRBC this month\par \par 10/13/20\par Pt is here for follow up.\par No new complaints.\par Has not needed a transfusion since 3 weeks ago.\par Continues to have diarrhea, waiting to be seen by GI\par \par 10/26/20\par Pt is here for follow up.\par C/O feeling fatigued.\par Has CLARK.\par No nausea or vomiting.\par No fever.\par Diarrhea has improved a little. She is no longer on Metformin\par \par 11/9/20\par Pt is here for follow up.\par Feels well. Denies SOB, CP, fatigue\par \par \par 12/7/20- Patient is for follow up and is due for cycle 9 of Vidaza.  She still has loose BM sometimes 10 times a day and was seeing Dr. Corey from GI- did not follow up recently and is unable to get an appt with him. She has lost about 10 lbs since September. No N/V. No fever or chills. No CP/SOB. She has been getting PRBC transfusion every 3 weeks now\par \par 01/04/20 Pt presented today for f/u visit . She is s/p 8 cycles of vidaza and was started on Luspatercept in Dec , 2020 . So far she received 1 injection , she is due for 2nd injection today . Adverse effect profile was discussed with her in detail , she reports having some dizziness and weakness after first injection . She received blood transfusion last wk . Blood work from today reviewed as well and counts are adequate . She denies any fevers,  chills,  or any new symptoms . \par  [de-identified] : \par

## 2021-01-10 NOTE — ASSESSMENT
[FreeTextEntry1] : # Lowrisk myelodysplastic syndrome, previously treated with  Revlimid and Procrit, now on Vidaza . 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 \par As per discussion with  Dr Ferrell who suggested to complete at least 6 cycles of Vidaza before switching treatment. For thrombocytosis advised ASA only without added Hydrea\par \par # Vitamin  B12 deficiency.\par \par # Thrombocytosis; continue ASA. \par Hold Hydrea for now,. CBC weekly\par \par # Hyperkalemia likely due to increased plts; pseudohyperkalemia: \par \par Plan:\par Patient has completed 9 cycles of Vidaza and she is requiring PRBC transfusion every 3 weeks now. \par Given the persistent transfusion dependence and also c/o diarrhea with Vidaza we discussed that we would change her treatment with Luspatercept. All her questions were answered regarding new treatment , luspatercept was superior to placebo for achieving transfusion-independence in patients with lower-risk MDS with ring sideroblasts,  for =8 weeks (38 versus 13 percent) and transfusion-independence for =12 weeks (28 versus 8 percent).  . \par \par Adverse effect profile was discussed with her in detail and she was told that they include but are not limited to Htn , abdominal pain , transaminitis , fatigue , weakness , arthralgias and muscle aches . \par She is tolerating treatment well .\par Blood work from today was reviewed and showed counts adequate for treatment . \par will proceed with Luspatercept 1mg/kg ( 60 mg SC ) today . \par \par Continue with Procrit 80,000 units for now . \par Continue B12 injections monthly. \par \par Monitor CBC weekly with transfusions as needed if Hb less than 7\par \par RTC in 3 weeks .\par \par \par Patient was seen and examined and discussed with Dr. Wade \par

## 2021-01-11 ENCOUNTER — LABORATORY RESULT (OUTPATIENT)
Age: 76
End: 2021-01-11

## 2021-01-11 ENCOUNTER — APPOINTMENT (OUTPATIENT)
Dept: INFUSION THERAPY | Facility: CLINIC | Age: 76
End: 2021-01-11

## 2021-01-11 LAB
HCT VFR BLD CALC: 23.9 %
HGB BLD-MCNC: 8 G/DL
MCHC RBC-ENTMCNC: 33.5 G/DL
MCHC RBC-ENTMCNC: 35.2 PG
MCV RBC AUTO: 105.3 FL
PLATELET # BLD AUTO: 502 K/UL
PMV BLD: 10.2 FL
RBC # BLD: 2.27 M/UL
RBC # FLD: 22.1 %
WBC # FLD AUTO: 6.63 K/UL

## 2021-01-11 RX ORDER — PREGABALIN 225 MG/1
1000 CAPSULE ORAL ONCE
Refills: 0 | Status: COMPLETED | OUTPATIENT
Start: 2021-01-11 | End: 2021-01-11

## 2021-01-11 RX ORDER — ERYTHROPOIETIN 10000 [IU]/ML
80000 INJECTION, SOLUTION INTRAVENOUS; SUBCUTANEOUS ONCE
Refills: 0 | Status: COMPLETED | OUTPATIENT
Start: 2021-01-11 | End: 2021-01-11

## 2021-01-11 RX ADMIN — ERYTHROPOIETIN 80000 UNIT(S): 10000 INJECTION, SOLUTION INTRAVENOUS; SUBCUTANEOUS at 13:34

## 2021-01-11 RX ADMIN — PREGABALIN 1000 MICROGRAM(S): 225 CAPSULE ORAL at 13:34

## 2021-01-18 ENCOUNTER — LABORATORY RESULT (OUTPATIENT)
Age: 76
End: 2021-01-18

## 2021-01-18 ENCOUNTER — APPOINTMENT (OUTPATIENT)
Dept: INFUSION THERAPY | Facility: CLINIC | Age: 76
End: 2021-01-18

## 2021-01-18 RX ORDER — ERYTHROPOIETIN 10000 [IU]/ML
80000 INJECTION, SOLUTION INTRAVENOUS; SUBCUTANEOUS ONCE
Refills: 0 | Status: COMPLETED | OUTPATIENT
Start: 2021-01-18 | End: 2021-01-18

## 2021-01-18 RX ADMIN — ERYTHROPOIETIN 80000 UNIT(S): 10000 INJECTION, SOLUTION INTRAVENOUS; SUBCUTANEOUS at 11:54

## 2021-01-20 LAB
HCT VFR BLD CALC: 24 %
HGB BLD-MCNC: 8 G/DL
MCHC RBC-ENTMCNC: 33.3 G/DL
MCHC RBC-ENTMCNC: 34.9 PG
MCV RBC AUTO: 104.8 FL
PLATELET # BLD AUTO: 573 K/UL
PMV BLD: 9.9 FL
RBC # BLD: 2.29 M/UL
RBC # FLD: 21.8 %
WBC # FLD AUTO: 6.11 K/UL

## 2021-01-25 ENCOUNTER — APPOINTMENT (OUTPATIENT)
Dept: HEMATOLOGY ONCOLOGY | Facility: CLINIC | Age: 76
End: 2021-01-25
Payer: MEDICARE

## 2021-01-25 ENCOUNTER — APPOINTMENT (OUTPATIENT)
Dept: INFUSION THERAPY | Facility: CLINIC | Age: 76
End: 2021-01-25
Payer: MEDICARE

## 2021-01-25 ENCOUNTER — LABORATORY RESULT (OUTPATIENT)
Age: 76
End: 2021-01-25

## 2021-01-25 VITALS
HEIGHT: 62 IN | DIASTOLIC BLOOD PRESSURE: 56 MMHG | WEIGHT: 139 LBS | HEART RATE: 96 BPM | BODY MASS INDEX: 25.58 KG/M2 | TEMPERATURE: 97.7 F | SYSTOLIC BLOOD PRESSURE: 130 MMHG

## 2021-01-25 LAB
HCT VFR BLD CALC: 21.6 %
HGB BLD-MCNC: 7.2 G/DL
MCHC RBC-ENTMCNC: 33.3 G/DL
MCHC RBC-ENTMCNC: 36 PG
MCV RBC AUTO: 108 FL
PLATELET # BLD AUTO: 417 K/UL
PMV BLD: 10.6 FL
RBC # BLD: 2 M/UL
RBC # FLD: 21.8 %
WBC # FLD AUTO: 6.42 K/UL

## 2021-01-25 PROCEDURE — 99215 OFFICE O/P EST HI 40 MIN: CPT

## 2021-01-25 RX ORDER — LUSPATERCEPT 75 MG/1
60 INJECTION, POWDER, LYOPHILIZED, FOR SOLUTION SUBCUTANEOUS ONCE
Refills: 0 | Status: COMPLETED | OUTPATIENT
Start: 2021-01-25 | End: 2021-01-25

## 2021-01-25 RX ORDER — ERYTHROPOIETIN 10000 [IU]/ML
80000 INJECTION, SOLUTION INTRAVENOUS; SUBCUTANEOUS ONCE
Refills: 0 | Status: COMPLETED | OUTPATIENT
Start: 2021-01-25 | End: 2021-01-25

## 2021-01-25 RX ADMIN — ERYTHROPOIETIN 80000 UNIT(S): 10000 INJECTION, SOLUTION INTRAVENOUS; SUBCUTANEOUS at 11:53

## 2021-01-25 RX ADMIN — LUSPATERCEPT 60 MILLIGRAM(S): 75 INJECTION, POWDER, LYOPHILIZED, FOR SOLUTION SUBCUTANEOUS at 11:54

## 2021-01-25 NOTE — PHYSICAL EXAM
[Restricted in physically strenuous activity but ambulatory and able to carry out work of a light or sedentary nature] : Status 1- Restricted in physically strenuous activity but ambulatory and able to carry out work of a light or sedentary nature, e.g., light house work, office work [Normal] : affect appropriate [de-identified] : conjunctival pallor

## 2021-01-25 NOTE — HISTORY OF PRESENT ILLNESS
[de-identified] : This is a 75 year old Black female with history of MDS. She's been on treatment with Revlimid 5 mg, 2 weeks on, 1 week off.  \par She is also on Procrit.\par  She underwent colonoscopy (2/2017)  Results noted no colitis, only hyperplastic polyp noted. \par  \par She has C/O abdominal pain and passage of mucus per rectum.\par She is planning to follow with her GI.\par Other than that she feels well.\par She is compliant with Revlimid .  [de-identified] : She missed on appointment for procrit last week.\par She starts her next cycle on 6/25/18\par C/o back pain radiating down her left which occurred when she bent to  something.\par No change in diarrhea.\par \par 7/31/18:\par Doing well. On Revlimid for more than 2yrs, 5mg 2weeks on 1 week off. She will be starting week on tonight. \par C/o diarrhea. On Imodium 2 pills AM and 2 pills PM. Doesn't help much with diarrhea. \par C/o nausea, abd pain. \par \par \par Colonoscopy in 2016 was normal. \par Did not have blood transfusion for over 2 years. \par On procrit [id number] weekly. Missed last week on 7/24/18 as she was out of town. She has been non compliant with weekly Procrit.\par Mammogram in 2017 was normal. \par \par 9/11/18\par pt is here for follow up.\par She feels the lomotil helps with the diarrhea.\par She was away for a few weeks and missed her procrit injections.\par No fever, abdominal  discomfort has been less since since use of lomotil.\par She started a new cycle 2 days ago.\par \par 10/2/18:\par She will start Revlimid tonight (week on). 2 weeks on, 1 week off. \par On ASA 81mg. \par Denies fever, nausea, vomiting, chest pain, SOB, abdominal pain, and bladder problems.\par C/o diarrhea. \par S/p 2 units PRBC transfusion on 9/25/18. \par On Procrit 68135 units weekly. Not compliant every week. \par C/o intermittent dizziness. \par \par 10/31/18\par Pt is here for follow up.\par C/O significant fatigue.\par Continues to have diarrhea, takes imodium and lomotil as needed.\par C/o dry cough, no fever.\par Cough started a month ago. It is worse in the mornings however over last week it has been constant all day.\par No chest pain.\par She was found to have low B12 levels and was started on B12 injections.\par \par 11/27/18:\par Doing well. Hospitalized for 3 days for cellulitis/abcess of the back s/p drainage and on Abx currently. Developed 3 days after Mg infusion as per pt. \par Denies nausea, vomiting, chest pain, SOB, abdominal pain, bowel and bladder problems.\par This is the week on Revlimid (week 1).\par S/p 1 unit PRBC transfusion in the hospital. \par On Procrit weekly \par \par 12/27/18:\par Doing well. No major complaints.\par Denies fever, nausea, vomiting, chest pain, SOB, abdominal pain, and bladder problems.\par On Revlimid 5 mg 2 weeks on 1 week off. This is the week off. She will start the week on sunday (12/30/18).\par Due for Procrit 47350 units today. \par Still has diarrhea. 4-5bm/day.\par On monthly B12 injections. \par \par 1/30/19:\par Patient here for follow up for MDS.  She is taking Revlimid 5 mg, 2 weeks on, 1 week off.  She will complete this current cycle on Saturday.  She has no new complaints today.  Patient denies cough, shortness of breath, denies fever, denies bone pain.\par \par 03/04/2019\par Patient is here for a follow up visit. She complaints of severe pain in her left shoulder and has had abscess drained 2 weeks ago. However the pain is worse and she has purulent discharge on examination. She also has Nasal sores that are painful. Culture from 11/2018 showed MRSA.  Denies Fever. \par She also complaints of swelling in her right leg. Not tender and not erythematous and negative Gong;s sign. \par Her diarrhea with Revlimid is ongoing and Imodium and Lomotil helps but not everyday. \par She is on 60,000 units of procrit weekly and Revlimid 5 mg 2 weeks on 1 week off. \par \par 4/23/19\par Pt is here for follow up.\par She feels well.\par The nasal sores have improved with bactroban\par The abscess on left shoulder has resolved.\par However she has a new one on the middle of her back.\par No fever.\par Pt has been on procrit 43118 units weekly, however she missed a dose in between.\par \par 5/8/19\par pt is here for follow up.\par no new complaints.\par feels well.\par Her skin abscess has resolved.\par she has been unable to go to ID, as she could not get an appt.\par No bleeding.\par She is compliant with revlimid.\par \par 6/6/19\par Pt is here for follow up.\par no new complaints \par Feels well.\par Is on week 2 of her Revlimid cycle. \par No transfusions since last visit\par \par 7/17/19\par Pt is here for follow up.\par C/O fatigue.\par Was away for a few days two weeks ago, but missed treatment with procrit for 2 weeks.\par Is complaint with Revlimid 2 weeks on 1 week off.\par Diarrhea is a little improved.\par \par \par 8/14/19\par Patient here for follow up visit, feeling well.  Although she is complaining of some pain at the left scapula area recently.  Patient denies cough, shortness of breath, denies fever, denies other bone pain.  She is off Revlimid  this week, due to restart on Monday.  She is scheduled for Procrit and Vitamin B12 injection today.  She plans to leave the country tomorrow to visit her mother who is ill.  She will return in about 10 days.\par \par 9/11/19\par Pt is here for follow up.\par She was away for 3 weeks, out of which she took procrit for 2 weeks in [place].\par She missed last week's dose.\par She had CBCs in [place] which showed Hgb of 8.9\par She feels well.\par She still getting diarrhea.\par She has been using lomotil with very minimal relief.\par She started a new cycle of Revlimid 4 days ago.\par \par 10/3/19\par Patient here for follow up visit, feeling fairly well.   Patient denies cough, shortness of breath, denies fever, denies other bone pain.  She remains on Revlimid.  She is scheduled for Procrit and Vitamin B12 injection today.\par \par 10/24/19\par Pt is here for follow up.\par Feels well.\par No SOB, fatigue.\par Compliant with procrit and Revl;imid.\par Remains on ASa.\par Diarrhea is unchanged.\par Pt takes imodium as needed.\par \par 11/14/19\par Pt is here for follow up.\par No new complaints.\par Starts a new cycle of Revlimid today.\par Diarrhea is same as before, no rectal bleeding.\par No fever.\par \par 12/5/19\par Pt is here for follow up.\par No new complaints.\par Denies feeling weaker than usual.\par No fever, SOB.\par No change in frequency of diarrhea.\par No pain.\par Will start next cycle of Revlimid in 3 days.\par \par \par !/16/20\par Pt was recently DCed from [facility] 3 days ago after being treated for pneumonia and MARCO.\par She is on po Levaquin.\par She restarted Revlmid 3 days ago.\par She is feeling better now. No fever.\par No SOB, Chest pain and back pain has resolved.\par Pt has a cough, no expectoration.\par She also recd a unit of PRBCs last week in the hospital\par \par \par 1/30/20\par Patient here for follow up visit, feeling well.  She has no new complaints. Cough is almost gone completely.  Patient denies shortness of breath, denies fever, denies bone pain.  Her appetite is ok, weight is stable.  She is due to restart Revlimid on Monday.\par \par 02/20/20\par Pt is here for follow up, feeling well.\par Offers no new complaints. Denies SOB, fever, chills, weight loss, CP, cough. \par Currently off week of Revlimid 5 mg. Restarts on Monday.\par Has procrit 80,000 units today. Next b 12 injection due in 2 weeks \par Did not get chest Xray\par CBC reviewed WBC 3.1, h/h 8.3/25%, plt 386, neutrophils 1.29\par \par 3/12/20\par Pt is here for follow up.\par She took Revlimid for 2 weeks and then has been off for one week although she was advised to take it daily without break.\par No SOB, Cough, fever.\par Has mild fatigue.\par \par 04/02/2020\par SIMONA KUHN a 75 year F is here today for follow up visit of MDS.\par Denies fever, chills, cough,night sweats, weight loss. \par Denies bleeding or bruising.\par Pt receives procrit 80,000 units weekly and B12 IM monthly. \par Continued Revlimid 5 mg daily x 3 weeks, has 1 dose left tonight. \par CBC reviewed: WBC 3.2, H/H 8.1/25.2, neutrophils 1.6, PLT 72\par \par 4/20/2020: The patient is here for follow up . She has no complaints of fever, chills, dizziness , chest pain and shortness of breath . She is still with diarrhea , up to 10 watery BMs per day, responds poorly to imodium and lomotil. She is on Revlimid 5 mg daily , took last dose yesterday night. Her last Procrit was on April 13. \par \par 5/26/20\par Pt is here for follow up.\par She is feeling well. Denies SOB, chest pain, fever.\par Continues to have diarrhea although she is off of Revlmid.\par Thinks it may due to diabetic meds.\par Wants to discuss BM bx results\par \par 6/22/20\par Pt is here for follow up.\par Feels well. Denies any fever, N, V, D\par Continues to have diarrhea, she will talk to her PCP about changing metformin for DM.\par Has been needing PRBCs 1 unit each month\par \par 7/20/20\par Pt is here for follow up.\par No new complaints. Has been feeling well.\par No SOB, CP. \par No N, V, D.\par She has recd 2 units of PRBCs since May 20.\par \par 8/17/20\par Pt is here for a follow up visit.\par She is feeling well.\par No new symptoms. No N, V, D.\par No bruising or bleeding. She continues to need PRBCs every 2-3 weeks.\par \par 8/31/20\par Pt is here for follow up. She is feeling well. No N, V, D.\par She is tolerating the hydrea well. No SOB, CP\par No bruising ior bleeding\par \par 9/8/20\par Pt is here for follow up.\par She had been contacted by the [facility] last week to advise her to stop the hydrea. Pt did not check her messages and continued with Hydrea.\par No fever, N, V, bleeding.\par Saw Dr Ferrell last week.\par \par 9/14/20\par Pt is here for folow up.\par Besides her usual complaint of diarrhea she is feeling well.\par Recd 1 unit PRBC last week.\par Has stopped Hydrea.\par No fever, mouth sores.\par \par 9/29/20\par Pt is here for folow up.\par No new complaints.\par Is seeing GI for diarrhea net week.\par No fever, night sweats.\par REcd 3 units of PRBC this month\par \par 10/13/20\par Pt is here for follow up.\par No new complaints.\par Has not needed a transfusion since 3 weeks ago.\par Continues to have diarrhea, waiting to be seen by GI\par \par 10/26/20\par Pt is here for follow up.\par C/O feeling fatigued.\par Has CLARK.\par No nausea or vomiting.\par No fever.\par Diarrhea has improved a little. She is no longer on Metformin\par \par 11/9/20\par Pt is here for follow up.\par Feels well. Denies SOB, CP, fatigue\par \par \par 12/7/20- Patient is for follow up and is due for cycle 9 of Vidaza.  She still has loose BM sometimes 10 times a day and was seeing Dr. Corey from GI- did not follow up recently and is unable to get an appt with him. She has lost about 10 lbs since September. No N/V. No fever or chills. No CP/SOB. She has been getting PRBC transfusion every 3 weeks now\par \par 01/04/20 Pt presented today for f/u visit . She is s/p 8 cycles of vidaza and was started on Luspatercept in Dec , 2020 . So far she received 1 injection , she is due for 2nd injection today . Adverse effect profile was discussed with her in detail , she reports having some dizziness and weakness after first injection . She received blood transfusion last wk . Blood work from today reviewed as well and counts are adequate . She denies any fevers,  chills,  or any new symptoms . \par \par 1/25/21\par Pt is here for follow up.\par C/O diarrhea, otherwise feeling better.\par Has not needed a transfusion since 12/29

## 2021-01-25 NOTE — ASSESSMENT
[FreeTextEntry1] : # Lowrisk myelodysplastic syndrome, previously treated with  Revlimid and Procrit, now on Vidaza . 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 \par Patient is s/p  9 cycles of Vidaza and she was requiring PRBC transfusion every 3 weeks \par Given the persistent transfusion dependence she was switched to Luspatercept\par # Vitamin  B12 deficiency.\par \par # Thrombocytosis; continue ASA. \par Hold Hydrea for now,. CBC weekly\par \par # Hyperkalemia likely due to increased plts; pseudohyperkalemia: \par \par Plan:\par .Pt has been advised that  luspatercept was superior to placebo for achieving transfusion-independence in patients with lower-risk MDS with ring sideroblasts,  for =8 weeks (38 versus 13 percent) and transfusion-independence for =12 weeks (28 versus 8 percent).  . \par \par Adverse effect profile was discussed with her in detail and she was told that they include but are not limited to Htn , abdominal pain , transaminitis , fatigue , weakness , arthralgias and muscle aches . \par She is tolerating treatment well .\par Blood work from today was reviewed and showed counts adequate for treatment . \par will proceed with Luspatercept 1mg/kg ( 60 mg SC ) today cycle # 3 . \par \par Continue with Procrit 80,000 units for now . \par Continue B12 injections monthly. \par \par Monitor CBC weekly with transfusions as needed if Hb less than 7\par GI eval\par RTC in 3 weeks .\par \par \par \par

## 2021-01-26 LAB
ALBUMIN SERPL ELPH-MCNC: 4.2 G/DL
ALP BLD-CCNC: 104 U/L
ALT SERPL-CCNC: 8 U/L
ANION GAP SERPL CALC-SCNC: 16 MMOL/L
AST SERPL-CCNC: 7 U/L
BILIRUB SERPL-MCNC: 0.6 MG/DL
BUN SERPL-MCNC: 28 MG/DL
CALCIUM SERPL-MCNC: 8.8 MG/DL
CHLORIDE SERPL-SCNC: 97 MMOL/L
CO2 SERPL-SCNC: 18 MMOL/L
CREAT SERPL-MCNC: 1.6 MG/DL
GLUCOSE SERPL-MCNC: 377 MG/DL
MAGNESIUM SERPL-MCNC: 1.5 MG/DL
POTASSIUM SERPL-SCNC: 5.3 MMOL/L
PROT SERPL-MCNC: 6.1 G/DL
SODIUM SERPL-SCNC: 131 MMOL/L

## 2021-01-28 ENCOUNTER — LABORATORY RESULT (OUTPATIENT)
Age: 76
End: 2021-01-28

## 2021-01-28 ENCOUNTER — APPOINTMENT (OUTPATIENT)
Dept: INFUSION THERAPY | Facility: CLINIC | Age: 76
End: 2021-01-28

## 2021-01-28 LAB
HCT VFR BLD CALC: 22.4 %
HGB BLD-MCNC: 7.5 G/DL
MAGNESIUM SERPL-MCNC: 1.4 MG/DL
MCHC RBC-ENTMCNC: 33.5 G/DL
MCHC RBC-ENTMCNC: 36.4 PG
MCV RBC AUTO: 108.7 FL
PLATELET # BLD AUTO: 505 K/UL
PMV BLD: 10.5 FL
RBC # BLD: 2.06 M/UL
RBC # FLD: 21.9 %
WBC # FLD AUTO: 9.17 K/UL

## 2021-01-28 RX ORDER — SODIUM CHLORIDE 9 MG/ML
500 INJECTION INTRAMUSCULAR; INTRAVENOUS; SUBCUTANEOUS
Refills: 0 | Status: DISCONTINUED | OUTPATIENT
Start: 2021-01-28 | End: 2021-05-28

## 2021-01-28 RX ORDER — THIAMINE HCL 100 MG
500 TABLET ORAL
Qty: 5 | Refills: 0 | Status: COMPLETED | COMMUNITY
Start: 2021-01-28 | End: 2021-02-02

## 2021-01-28 RX ORDER — MAGNESIUM SULFATE 500 MG/ML
2 VIAL (ML) INJECTION ONCE
Refills: 0 | Status: COMPLETED | OUTPATIENT
Start: 2021-01-28 | End: 2021-01-28

## 2021-01-28 RX ADMIN — SODIUM CHLORIDE 500 MILLILITER(S): 9 INJECTION INTRAMUSCULAR; INTRAVENOUS; SUBCUTANEOUS at 14:09

## 2021-01-28 RX ADMIN — Medication 2 GRAM(S): at 15:09

## 2021-01-28 RX ADMIN — Medication 50 GRAM(S): at 14:09

## 2021-01-28 RX ADMIN — SODIUM CHLORIDE 500 MILLILITER(S): 9 INJECTION INTRAMUSCULAR; INTRAVENOUS; SUBCUTANEOUS at 15:09

## 2021-02-04 ENCOUNTER — APPOINTMENT (OUTPATIENT)
Dept: INFUSION THERAPY | Facility: CLINIC | Age: 76
End: 2021-02-04

## 2021-02-08 ENCOUNTER — LABORATORY RESULT (OUTPATIENT)
Age: 76
End: 2021-02-08

## 2021-02-08 ENCOUNTER — APPOINTMENT (OUTPATIENT)
Dept: INFUSION THERAPY | Facility: CLINIC | Age: 76
End: 2021-02-08

## 2021-02-08 LAB
HCT VFR BLD CALC: 19.6 %
HGB BLD-MCNC: 6.6 G/DL
MCHC RBC-ENTMCNC: 33.7 G/DL
MCHC RBC-ENTMCNC: 37.5 PG
MCV RBC AUTO: 111.4 FL
PLATELET # BLD AUTO: 513 K/UL
PMV BLD: 10.1 FL
RBC # BLD: 1.76 M/UL
RBC # FLD: 20.3 %
WBC # FLD AUTO: 6.88 K/UL

## 2021-02-08 RX ORDER — ERYTHROPOIETIN 10000 [IU]/ML
80000 INJECTION, SOLUTION INTRAVENOUS; SUBCUTANEOUS ONCE
Refills: 0 | Status: COMPLETED | OUTPATIENT
Start: 2021-02-08 | End: 2021-02-08

## 2021-02-08 RX ADMIN — ERYTHROPOIETIN 80000 UNIT(S): 10000 INJECTION, SOLUTION INTRAVENOUS; SUBCUTANEOUS at 13:23

## 2021-02-09 ENCOUNTER — APPOINTMENT (OUTPATIENT)
Dept: INFUSION THERAPY | Facility: CLINIC | Age: 76
End: 2021-02-09

## 2021-02-09 LAB
ABO + RH PNL BLD: NORMAL
BLD GP AB SCN SERPL QL: NORMAL

## 2021-02-10 ENCOUNTER — APPOINTMENT (OUTPATIENT)
Dept: GASTROENTEROLOGY | Facility: CLINIC | Age: 76
End: 2021-02-10

## 2021-02-16 ENCOUNTER — LABORATORY RESULT (OUTPATIENT)
Age: 76
End: 2021-02-16

## 2021-02-16 ENCOUNTER — APPOINTMENT (OUTPATIENT)
Dept: INFUSION THERAPY | Facility: CLINIC | Age: 76
End: 2021-02-16
Payer: MEDICARE

## 2021-02-16 ENCOUNTER — APPOINTMENT (OUTPATIENT)
Dept: HEMATOLOGY ONCOLOGY | Facility: CLINIC | Age: 76
End: 2021-02-16
Payer: MEDICARE

## 2021-02-16 VITALS
WEIGHT: 136 LBS | HEIGHT: 62 IN | HEART RATE: 127 BPM | DIASTOLIC BLOOD PRESSURE: 58 MMHG | SYSTOLIC BLOOD PRESSURE: 126 MMHG | TEMPERATURE: 98 F | BODY MASS INDEX: 25.03 KG/M2

## 2021-02-16 LAB
HCT VFR BLD CALC: 23.4 %
HGB BLD-MCNC: 7.6 G/DL
MCHC RBC-ENTMCNC: 32.5 G/DL
MCHC RBC-ENTMCNC: 33.8 PG
MCV RBC AUTO: 104 FL
PLATELET # BLD AUTO: 475 K/UL
PMV BLD: NORMAL
RBC # BLD: 2.25 M/UL
RBC # FLD: 25 %
WBC # FLD AUTO: 7.87 K/UL

## 2021-02-16 PROCEDURE — 99215 OFFICE O/P EST HI 40 MIN: CPT

## 2021-02-16 RX ORDER — ERYTHROPOIETIN 10000 [IU]/ML
80000 INJECTION, SOLUTION INTRAVENOUS; SUBCUTANEOUS ONCE
Refills: 0 | Status: COMPLETED | OUTPATIENT
Start: 2021-02-16 | End: 2021-02-16

## 2021-02-16 RX ORDER — PREGABALIN 225 MG/1
1000 CAPSULE ORAL ONCE
Refills: 0 | Status: COMPLETED | OUTPATIENT
Start: 2021-02-16 | End: 2021-02-16

## 2021-02-16 RX ORDER — LUSPATERCEPT 75 MG/1
80 INJECTION, POWDER, LYOPHILIZED, FOR SOLUTION SUBCUTANEOUS ONCE
Refills: 0 | Status: COMPLETED | OUTPATIENT
Start: 2021-02-16 | End: 2021-02-16

## 2021-02-16 RX ADMIN — ERYTHROPOIETIN 80000 UNIT(S): 10000 INJECTION, SOLUTION INTRAVENOUS; SUBCUTANEOUS at 16:20

## 2021-02-16 RX ADMIN — PREGABALIN 1000 MICROGRAM(S): 225 CAPSULE ORAL at 16:20

## 2021-02-16 RX ADMIN — LUSPATERCEPT 80 MILLIGRAM(S): 75 INJECTION, POWDER, LYOPHILIZED, FOR SOLUTION SUBCUTANEOUS at 16:21

## 2021-02-18 NOTE — HISTORY OF PRESENT ILLNESS
[de-identified] : She missed on appointment for procrit last week.\par She starts her next cycle on 6/25/18\par C/o back pain radiating down her left which occurred when she bent to  something.\par No change in diarrhea.\par \par 7/31/18:\par Doing well. On Revlimid for more than 2yrs, 5mg 2weeks on 1 week off. She will be starting week on tonight. \par C/o diarrhea. On Imodium 2 pills AM and 2 pills PM. Doesn't help much with diarrhea. \par C/o nausea, abd pain. \par \par \par Colonoscopy in 2016 was normal. \par Did not have blood transfusion for over 2 years. \par On procrit [id number] weekly. Missed last week on 7/24/18 as she was out of town. She has been non compliant with weekly Procrit.\par Mammogram in 2017 was normal. \par \par 9/11/18\par pt is here for follow up.\par She feels the lomotil helps with the diarrhea.\par She was away for a few weeks and missed her procrit injections.\par No fever, abdominal  discomfort has been less since since use of lomotil.\par She started a new cycle 2 days ago.\par \par 10/2/18:\par She will start Revlimid tonight (week on). 2 weeks on, 1 week off. \par On ASA 81mg. \par Denies fever, nausea, vomiting, chest pain, SOB, abdominal pain, and bladder problems.\par C/o diarrhea. \par S/p 2 units PRBC transfusion on 9/25/18. \par On Procrit 68923 units weekly. Not compliant every week. \par C/o intermittent dizziness. \par \par 10/31/18\par Pt is here for follow up.\par C/O significant fatigue.\par Continues to have diarrhea, takes imodium and lomotil as needed.\par C/o dry cough, no fever.\par Cough started a month ago. It is worse in the mornings however over last week it has been constant all day.\par No chest pain.\par She was found to have low B12 levels and was started on B12 injections.\par \par 11/27/18:\par Doing well. Hospitalized for 3 days for cellulitis/abcess of the back s/p drainage and on Abx currently. Developed 3 days after Mg infusion as per pt. \par Denies nausea, vomiting, chest pain, SOB, abdominal pain, bowel and bladder problems.\par This is the week on Revlimid (week 1).\par S/p 1 unit PRBC transfusion in the hospital. \par On Procrit weekly \par \par 12/27/18:\par Doing well. No major complaints.\par Denies fever, nausea, vomiting, chest pain, SOB, abdominal pain, and bladder problems.\par On Revlimid 5 mg 2 weeks on 1 week off. This is the week off. She will start the week on sunday (12/30/18).\par Due for Procrit 24681 units today. \par Still has diarrhea. 4-5bm/day.\par On monthly B12 injections. \par \par 1/30/19:\par Patient here for follow up for MDS.  She is taking Revlimid 5 mg, 2 weeks on, 1 week off.  She will complete this current cycle on Saturday.  She has no new complaints today.  Patient denies cough, shortness of breath, denies fever, denies bone pain.\par \par 03/04/2019\par Patient is here for a follow up visit. She complaints of severe pain in her left shoulder and has had abscess drained 2 weeks ago. However the pain is worse and she has purulent discharge on examination. She also has Nasal sores that are painful. Culture from 11/2018 showed MRSA.  Denies Fever. \par She also complaints of swelling in her right leg. Not tender and not erythematous and negative Gong;s sign. \par Her diarrhea with Revlimid is ongoing and Imodium and Lomotil helps but not everyday. \par She is on 60,000 units of procrit weekly and Revlimid 5 mg 2 weeks on 1 week off. \par \par 4/23/19\par Pt is here for follow up.\par She feels well.\par The nasal sores have improved with bactroban\par The abscess on left shoulder has resolved.\par However she has a new one on the middle of her back.\par No fever.\par Pt has been on procrit 15958 units weekly, however she missed a dose in between.\par \par 5/8/19\par pt is here for follow up.\par no new complaints.\par feels well.\par Her skin abscess has resolved.\par she has been unable to go to ID, as she could not get an appt.\par No bleeding.\par She is compliant with revlimid.\par \par 6/6/19\par Pt is here for follow up.\par no new complaints \par Feels well.\par Is on week 2 of her Revlimid cycle. \par No transfusions since last visit\par \par 7/17/19\par Pt is here for follow up.\par C/O fatigue.\par Was away for a few days two weeks ago, but missed treatment with procrit for 2 weeks.\par Is complaint with Revlimid 2 weeks on 1 week off.\par Diarrhea is a little improved.\par \par \par 8/14/19\par Patient here for follow up visit, feeling well.  Although she is complaining of some pain at the left scapula area recently.  Patient denies cough, shortness of breath, denies fever, denies other bone pain.  She is off Revlimid  this week, due to restart on Monday.  She is scheduled for Procrit and Vitamin B12 injection today.  She plans to leave the country tomorrow to visit her mother who is ill.  She will return in about 10 days.\par \par 9/11/19\par Pt is here for follow up.\par She was away for 3 weeks, out of which she took procrit for 2 weeks in [place].\par She missed last week's dose.\par She had CBCs in [place] which showed Hgb of 8.9\par She feels well.\par She still getting diarrhea.\par She has been using lomotil with very minimal relief.\par She started a new cycle of Revlimid 4 days ago.\par \par 10/3/19\par Patient here for follow up visit, feeling fairly well.   Patient denies cough, shortness of breath, denies fever, denies other bone pain.  She remains on Revlimid.  She is scheduled for Procrit and Vitamin B12 injection today.\par \par 10/24/19\par Pt is here for follow up.\par Feels well.\par No SOB, fatigue.\par Compliant with procrit and Revl;imid.\par Remains on ASa.\par Diarrhea is unchanged.\par Pt takes imodium as needed.\par \par 11/14/19\par Pt is here for follow up.\par No new complaints.\par Starts a new cycle of Revlimid today.\par Diarrhea is same as before, no rectal bleeding.\par No fever.\par \par 12/5/19\par Pt is here for follow up.\par No new complaints.\par Denies feeling weaker than usual.\par No fever, SOB.\par No change in frequency of diarrhea.\par No pain.\par Will start next cycle of Revlimid in 3 days.\par \par \par !/16/20\par Pt was recently DC[facility] from [facility] 3 days ago after being treated for pneumonia and MARCO.\par She is on po Levaquin.\par She restarted Revlmid 3 days ago.\par She is feeling better now. No fever.\par No SOB, Chest pain and back pain has resolved.\par Pt has a cough, no expectoration.\par She also recd a unit of PRBCs last week in the hospital\par \par \par 1/30/20\par Patient here for follow up visit, feeling well.  She has no new complaints. Cough is almost gone completely.  Patient denies shortness of breath, denies fever, denies bone pain.  Her appetite is ok, weight is stable.  She is due to restart Revlimid on Monday.\par \par 02/20/20\par Pt is here for follow up, feeling well.\par Offers no new complaints. Denies SOB, fever, chills, weight loss, CP, cough. \par Currently off week of Revlimid 5 mg. Restarts on Monday.\par Has procrit 80,000 units today. Next b 12 injection due in 2 weeks \par Did not get chest Xray\par CBC reviewed WBC 3.1, h/h 8.3/25%, plt 386, neutrophils 1.29\par \par 3/12/20\par Pt is here for follow up.\par She took Revlimid for 2 weeks and then has been off for one week although she was advised to take it daily without break.\par No SOB, Cough, fever.\par Has mild fatigue.\par \par 04/02/2020\par SIMONA KUHN a 75 year F is here today for follow up visit of MDS.\par Denies fever, chills, cough,night sweats, weight loss. \par Denies bleeding or bruising.\par Pt receives procrit 80,000 units weekly and B12 IM monthly. \par Continued Revlimid 5 mg daily x 3 weeks, has 1 dose left tonight. \par CBC reviewed: WBC 3.2, H/H 8.1/25.2, neutrophils 1.6, PLT 72\par \par 4/20/2020: The patient is here for follow up . She has no complaints of fever, chills, dizziness , chest pain and shortness of breath . She is still with diarrhea , up to 10 watery BMs per day, responds poorly to imodium and lomotil. She is on Revlimid 5 mg daily , took last dose yesterday night. Her last Procrit was on April 13. \par \par 5/26/20\par Pt is here for follow up.\par She is feeling well. Denies SOB, chest pain, fever.\par Continues to have diarrhea although she is off of Revlmid.\par Thinks it may due to diabetic meds.\par Wants to discuss BM bx results\par \par 6/22/20\par Pt is here for follow up.\par Feels well. Denies any fever, N, V, D\par Continues to have diarrhea, she will talk to her PCP about changing metformin for DM.\par Has been needing PRBCs 1 unit each month\par \par 7/20/20\par Pt is here for follow up.\par No new complaints. Has been feeling well.\par No SOB, CP. \par No N, V, D.\par She has recd 2 units of PRBCs since May 20.\par \par 8/17/20\par Pt is here for a follow up visit.\par She is feeling well.\par No new symptoms. No N, V, D.\par No bruising or bleeding. She continues to need PRBCs every 2-3 weeks.\par \par 8/31/20\par Pt is here for follow up. She is feeling well. No N, V, D.\par She is tolerating the hydrea well. No SOB, CP\par No bruising ior bleeding\par \par 9/8/20\par Pt is here for follow up.\par She had been contacted by the [facility] last week to advise her to stop the hydrea. Pt did not check her messages and continued with Hydrea.\par No fever, N, V, bleeding.\par Saw Dr Ferrell last week.\par \par 9/14/20\par Pt is here for folow up.\par Besides her usual complaint of diarrhea she is feeling well.\par Recd 1 unit PRBC last week.\par Has stopped Hydrea.\par No fever, mouth sores.\par \par 9/29/20\par Pt is here for folow up.\par No new complaints.\par Is seeing GI for diarrhea net week.\par No fever, night sweats.\par REcd 3 units of PRBC this month\par \par 10/13/20\par Pt is here for follow up.\par No new complaints.\par Has not needed a transfusion since 3 weeks ago.\par Continues to have diarrhea, waiting to be seen by GI\par \par 10/26/20\par Pt is here for follow up.\par C/O feeling fatigued.\par Has CLARK.\par No nausea or vomiting.\par No fever.\par Diarrhea has improved a little. She is no longer on Metformin\par \par 11/9/20\par Pt is here for follow up.\par Feels well. Denies SOB, CP, fatigue\par \par \par 12/7/20- Patient is for follow up and is due for cycle 9 of Vidaza.  She still has loose BM sometimes 10 times a day and was seeing Dr. Corey from GI- did not follow up recently and is unable to get an appt with him. She has lost about 10 lbs since September. No N/V. No fever or chills. No CP/SOB. She has been getting PRBC transfusion every 3 weeks now\par \par 01/04/20 Pt presented today for f/u visit . She is s/p 8 cycles of vidaza and was started on Luspatercept in Dec , 2020 . So far she received 1 injection , she is due for 2nd injection today . Adverse effect profile was discussed with her in detail , she reports having some dizziness and weakness after first injection . She received blood transfusion last wk . Blood work from today reviewed as well and counts are adequate . She denies any fevers,  chills,  or any new symptoms . \par \par 1/25/21\par Pt is here for follow up.\par C/O diarrhea, otherwise feeling better.\par Has not needed a transfusion since 12/29\par \par 2/16/21\par Pt is here for follow up.\par Needed transfusion on 2/8/21.\par Continues to C/o fatigue. Diarrhea remains the same, has not made GI appt as yet. [de-identified] : \par

## 2021-02-18 NOTE — ASSESSMENT
[FreeTextEntry1] : # Lowrisk myelodysplastic syndrome, previously treated with  Revlimid and Procrit, now on Vidaza . 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 \par Patient is s/p  9 cycles of Vidaza and she was requiring PRBC transfusion every 3 weeks \par Given the persistent transfusion dependence she was switched to Luspatercept\par # Vitamin  B12 deficiency.\par \par # Thrombocytosis improved; continue ASA. \par Hold Hydrea for now,. CBC weekly\par \par # Hyperkalemia likely due to increased plts; pseudohyperkalemia: \par \par Plan:\par Pt needed a PRBC transfusion last week\par will increase Luspatercept 1.33mg/kg  today cycle # 4 \par \par Adverse effect profile was discussed with her in detail and she was told that they include but are not limited to Htn , abdominal pain , transaminitis , fatigue , weakness , arthralgias and muscle aches . \par She is tolerating treatment well .\par Blood work from today was reviewed and showed counts adequate for treatment . \par . \par \par Continue with Procrit 80,000 units for now . \par Continue B12 injections monthly. \par \par Monitor CBC weekly with transfusions as needed if Hb less than 7\par GI eval\par RTC in 3 weeks .\par \par \par \par

## 2021-02-18 NOTE — PHYSICAL EXAM
[Restricted in physically strenuous activity but ambulatory and able to carry out work of a light or sedentary nature] : Status 1- Restricted in physically strenuous activity but ambulatory and able to carry out work of a light or sedentary nature, e.g., light house work, office work [Normal] : affect appropriate [de-identified] : conjunctival pallor

## 2021-02-22 ENCOUNTER — OUTPATIENT (OUTPATIENT)
Dept: OUTPATIENT SERVICES | Facility: HOSPITAL | Age: 76
LOS: 1 days | Discharge: HOME | End: 2021-02-22

## 2021-02-22 ENCOUNTER — APPOINTMENT (OUTPATIENT)
Dept: INFUSION THERAPY | Facility: CLINIC | Age: 76
End: 2021-02-22

## 2021-02-22 ENCOUNTER — LABORATORY RESULT (OUTPATIENT)
Age: 76
End: 2021-02-22

## 2021-02-22 DIAGNOSIS — D46.9 MYELODYSPLASTIC SYNDROME, UNSPECIFIED: ICD-10-CM

## 2021-02-22 DIAGNOSIS — Z51.11 ENCOUNTER FOR ANTINEOPLASTIC CHEMOTHERAPY: ICD-10-CM

## 2021-02-22 DIAGNOSIS — D64.9 ANEMIA, UNSPECIFIED: ICD-10-CM

## 2021-02-22 LAB
HCT VFR BLD CALC: 24.5 %
HGB BLD-MCNC: 8.1 G/DL
MCHC RBC-ENTMCNC: 33.1 G/DL
MCHC RBC-ENTMCNC: 34.5 PG
MCV RBC AUTO: 104.3 FL
PLATELET # BLD AUTO: 559 K/UL
PMV BLD: 9.3 FL
RBC # BLD: 2.35 M/UL
RBC # FLD: 24.2 %
WBC # FLD AUTO: 6.49 K/UL

## 2021-02-22 RX ORDER — ERYTHROPOIETIN 10000 [IU]/ML
80000 INJECTION, SOLUTION INTRAVENOUS; SUBCUTANEOUS ONCE
Refills: 0 | Status: COMPLETED | OUTPATIENT
Start: 2021-02-22 | End: 2021-02-22

## 2021-02-22 RX ADMIN — ERYTHROPOIETIN 80000 UNIT(S): 10000 INJECTION, SOLUTION INTRAVENOUS; SUBCUTANEOUS at 12:03

## 2021-03-01 ENCOUNTER — APPOINTMENT (OUTPATIENT)
Dept: INFUSION THERAPY | Facility: CLINIC | Age: 76
End: 2021-03-01

## 2021-03-01 ENCOUNTER — LABORATORY RESULT (OUTPATIENT)
Age: 76
End: 2021-03-01

## 2021-03-01 LAB
ABO + RH PNL BLD: NORMAL
BLD GP AB SCN SERPL QL: NORMAL
HCT VFR BLD CALC: 22.2 %
HGB BLD-MCNC: 7.4 G/DL
MCHC RBC-ENTMCNC: 33.3 G/DL
MCHC RBC-ENTMCNC: 35.4 PG
MCV RBC AUTO: 106.2 FL
PLATELET # BLD AUTO: 492 K/UL
PMV BLD: 9.9 FL
RBC # BLD: 2.09 M/UL
RBC # FLD: 24.7 %
WBC # FLD AUTO: 6.32 K/UL

## 2021-03-01 RX ORDER — ERYTHROPOIETIN 10000 [IU]/ML
80000 INJECTION, SOLUTION INTRAVENOUS; SUBCUTANEOUS ONCE
Refills: 0 | Status: COMPLETED | OUTPATIENT
Start: 2021-03-01 | End: 2021-03-01

## 2021-03-01 RX ADMIN — ERYTHROPOIETIN 80000 UNIT(S): 10000 INJECTION, SOLUTION INTRAVENOUS; SUBCUTANEOUS at 11:31

## 2021-03-02 ENCOUNTER — APPOINTMENT (OUTPATIENT)
Dept: INFUSION THERAPY | Facility: CLINIC | Age: 76
End: 2021-03-02

## 2021-03-08 ENCOUNTER — LABORATORY RESULT (OUTPATIENT)
Age: 76
End: 2021-03-08

## 2021-03-08 ENCOUNTER — APPOINTMENT (OUTPATIENT)
Dept: INFUSION THERAPY | Facility: CLINIC | Age: 76
End: 2021-03-08
Payer: MEDICARE

## 2021-03-08 ENCOUNTER — APPOINTMENT (OUTPATIENT)
Dept: HEMATOLOGY ONCOLOGY | Facility: CLINIC | Age: 76
End: 2021-03-08
Payer: MEDICARE

## 2021-03-08 VITALS
DIASTOLIC BLOOD PRESSURE: 59 MMHG | TEMPERATURE: 97.7 F | HEART RATE: 104 BPM | WEIGHT: 141 LBS | BODY MASS INDEX: 25.95 KG/M2 | SYSTOLIC BLOOD PRESSURE: 132 MMHG | HEIGHT: 62 IN

## 2021-03-08 LAB
HCT VFR BLD CALC: 25.1 %
HGB BLD-MCNC: 8.4 G/DL
MCHC RBC-ENTMCNC: 33.5 G/DL
MCHC RBC-ENTMCNC: 34.9 PG
MCV RBC AUTO: 104.1 FL
PLATELET # BLD AUTO: 481 K/UL
PMV BLD: 10 FL
RBC # BLD: 2.41 M/UL
RBC # FLD: 23 %
WBC # FLD AUTO: 5.78 K/UL

## 2021-03-08 PROCEDURE — 99215 OFFICE O/P EST HI 40 MIN: CPT

## 2021-03-08 RX ORDER — ERYTHROPOIETIN 10000 [IU]/ML
80000 INJECTION, SOLUTION INTRAVENOUS; SUBCUTANEOUS ONCE
Refills: 0 | Status: COMPLETED | OUTPATIENT
Start: 2021-03-08 | End: 2021-03-08

## 2021-03-08 RX ORDER — LUSPATERCEPT 75 MG/1
80 INJECTION, POWDER, LYOPHILIZED, FOR SOLUTION SUBCUTANEOUS ONCE
Refills: 0 | Status: COMPLETED | OUTPATIENT
Start: 2021-03-08 | End: 2021-03-08

## 2021-03-08 RX ADMIN — ERYTHROPOIETIN 80000 UNIT(S): 10000 INJECTION, SOLUTION INTRAVENOUS; SUBCUTANEOUS at 13:10

## 2021-03-08 RX ADMIN — LUSPATERCEPT 80 MILLIGRAM(S): 75 INJECTION, POWDER, LYOPHILIZED, FOR SOLUTION SUBCUTANEOUS at 13:11

## 2021-03-08 NOTE — HISTORY OF PRESENT ILLNESS
[de-identified] : She missed on appointment for procrit last week.\par She starts her next cycle on 6/25/18\par C/o back pain radiating down her left which occurred when she bent to  something.\par No change in diarrhea.\par \par 7/31/18:\par Doing well. On Revlimid for more than 2yrs, 5mg 2weeks on 1 week off. She will be starting week on tonight. \par C/o diarrhea. On Imodium 2 pills AM and 2 pills PM. Doesn't help much with diarrhea. \par C/o nausea, abd pain. \par \par \par Colonoscopy in 2016 was normal. \par Did not have blood transfusion for over 2 years. \par On procrit [id number] weekly. Missed last week on 7/24/18 as she was out of town. She has been non compliant with weekly Procrit.\par Mammogram in 2017 was normal. \par \par 9/11/18\par pt is here for follow up.\par She feels the lomotil helps with the diarrhea.\par She was away for a few weeks and missed her procrit injections.\par No fever, abdominal  discomfort has been less since since use of lomotil.\par She started a new cycle 2 days ago.\par \par 10/2/18:\par She will start Revlimid tonight (week on). 2 weeks on, 1 week off. \par On ASA 81mg. \par Denies fever, nausea, vomiting, chest pain, SOB, abdominal pain, and bladder problems.\par C/o diarrhea. \par S/p 2 units PRBC transfusion on 9/25/18. \par On Procrit 95664 units weekly. Not compliant every week. \par C/o intermittent dizziness. \par \par 10/31/18\par Pt is here for follow up.\par C/O significant fatigue.\par Continues to have diarrhea, takes imodium and lomotil as needed.\par C/o dry cough, no fever.\par Cough started a month ago. It is worse in the mornings however over last week it has been constant all day.\par No chest pain.\par She was found to have low B12 levels and was started on B12 injections.\par \par 11/27/18:\par Doing well. Hospitalized for 3 days for cellulitis/abcess of the back s/p drainage and on Abx currently. Developed 3 days after Mg infusion as per pt. \par Denies nausea, vomiting, chest pain, SOB, abdominal pain, bowel and bladder problems.\par This is the week on Revlimid (week 1).\par S/p 1 unit PRBC transfusion in the hospital. \par On Procrit weekly \par \par 12/27/18:\par Doing well. No major complaints.\par Denies fever, nausea, vomiting, chest pain, SOB, abdominal pain, and bladder problems.\par On Revlimid 5 mg 2 weeks on 1 week off. This is the week off. She will start the week on sunday (12/30/18).\par Due for Procrit 68100 units today. \par Still has diarrhea. 4-5bm/day.\par On monthly B12 injections. \par \par 1/30/19:\par Patient here for follow up for MDS.  She is taking Revlimid 5 mg, 2 weeks on, 1 week off.  She will complete this current cycle on Saturday.  She has no new complaints today.  Patient denies cough, shortness of breath, denies fever, denies bone pain.\par \par 03/04/2019\par Patient is here for a follow up visit. She complaints of severe pain in her left shoulder and has had abscess drained 2 weeks ago. However the pain is worse and she has purulent discharge on examination. She also has Nasal sores that are painful. Culture from 11/2018 showed MRSA.  Denies Fever. \par She also complaints of swelling in her right leg. Not tender and not erythematous and negative Gong;s sign. \par Her diarrhea with Revlimid is ongoing and Imodium and Lomotil helps but not everyday. \par She is on 60,000 units of procrit weekly and Revlimid 5 mg 2 weeks on 1 week off. \par \par 4/23/19\par Pt is here for follow up.\par She feels well.\par The nasal sores have improved with bactroban\par The abscess on left shoulder has resolved.\par However she has a new one on the middle of her back.\par No fever.\par Pt has been on procrit 93104 units weekly, however she missed a dose in between.\par \par 5/8/19\par pt is here for follow up.\par no new complaints.\par feels well.\par Her skin abscess has resolved.\par she has been unable to go to ID, as she could not get an appt.\par No bleeding.\par She is compliant with revlimid.\par \par 6/6/19\par Pt is here for follow up.\par no new complaints \par Feels well.\par Is on week 2 of her Revlimid cycle. \par No transfusions since last visit\par \par 7/17/19\par Pt is here for follow up.\par C/O fatigue.\par Was away for a few days two weeks ago, but missed treatment with procrit for 2 weeks.\par Is complaint with Revlimid 2 weeks on 1 week off.\par Diarrhea is a little improved.\par \par \par 8/14/19\par Patient here for follow up visit, feeling well.  Although she is complaining of some pain at the left scapula area recently.  Patient denies cough, shortness of breath, denies fever, denies other bone pain.  She is off Revlimid  this week, due to restart on Monday.  She is scheduled for Procrit and Vitamin B12 injection today.  She plans to leave the country tomorrow to visit her mother who is ill.  She will return in about 10 days.\par \par 9/11/19\par Pt is here for follow up.\par She was away for 3 weeks, out of which she took procrit for 2 weeks in [place].\par She missed last week's dose.\par She had CBCs in [place] which showed Hgb of 8.9\par She feels well.\par She still getting diarrhea.\par She has been using lomotil with very minimal relief.\par She started a new cycle of Revlimid 4 days ago.\par \par 10/3/19\par Patient here for follow up visit, feeling fairly well.   Patient denies cough, shortness of breath, denies fever, denies other bone pain.  She remains on Revlimid.  She is scheduled for Procrit and Vitamin B12 injection today.\par \par 10/24/19\par Pt is here for follow up.\par Feels well.\par No SOB, fatigue.\par Compliant with procrit and Revl;imid.\par Remains on ASa.\par Diarrhea is unchanged.\par Pt takes imodium as needed.\par \par 11/14/19\par Pt is here for follow up.\par No new complaints.\par Starts a new cycle of Revlimid today.\par Diarrhea is same as before, no rectal bleeding.\par No fever.\par \par 12/5/19\par Pt is here for follow up.\par No new complaints.\par Denies feeling weaker than usual.\par No fever, SOB.\par No change in frequency of diarrhea.\par No pain.\par Will start next cycle of Revlimid in 3 days.\par \par \par !/16/20\par Pt was recently DC[facility] 3 days ago after being treated for pneumonia and MARCO.\par She is on po Levaquin.\par She restarted Revlmid 3 days ago.\par She is feeling better now. No fever.\par No SOB, Chest pain and back pain has resolved.\par Pt has a cough, no expectoration.\par She also recd a unit of PRBCs last week in the hospital\par \par \par 1/30/20\par Patient here for follow up visit, feeling well.  She has no new complaints. Cough is almost gone completely.  Patient denies shortness of breath, denies fever, denies bone pain.  Her appetite is ok, weight is stable.  She is due to restart Revlimid on Monday.\par \par 02/20/20\par Pt is here for follow up, feeling well.\par Offers no new complaints. Denies SOB, fever, chills, weight loss, CP, cough. \par Currently off week of Revlimid 5 mg. Restarts on Monday.\par Has procrit 80,000 units today. Next b 12 injection due in 2 weeks \par Did not get chest Xray\par CBC reviewed WBC 3.1, h/h 8.3/25%, plt 386, neutrophils 1.29\par \par 3/12/20\par Pt is here for follow up.\par She took Revlimid for 2 weeks and then has been off for one week although she was advised to take it daily without break.\par No SOB, Cough, fever.\par Has mild fatigue.\par \par 04/02/2020\par SIMONA KUHN a 75 year F is here today for follow up visit of MDS.\par Denies fever, chills, cough,night sweats, weight loss. \par Denies bleeding or bruising.\par Pt receives procrit 80,000 units weekly and B12 IM monthly. \par Continued Revlimid 5 mg daily x 3 weeks, has 1 dose left tonight. \par CBC reviewed: WBC 3.2, H/H 8.1/25.2, neutrophils 1.6, PLT 72\par \par 4/20/2020: The patient is here for follow up . She has no complaints of fever, chills, dizziness , chest pain and shortness of breath . She is still with diarrhea , up to 10 watery BMs per day, responds poorly to imodium and lomotil. She is on Revlimid 5 mg daily , took last dose yesterday night. Her last Procrit was on April 13. \par \par 5/26/20\par Pt is here for follow up.\par She is feeling well. Denies SOB, chest pain, fever.\par Continues to have diarrhea although she is off of Revlmid.\par Thinks it may due to diabetic meds.\par Wants to discuss BM bx results\par \par 6/22/20\par Pt is here for follow up.\par Feels well. Denies any fever, N, V, D\par Continues to have diarrhea, she will talk to her PCP about changing metformin for DM.\par Has been needing PRBCs 1 unit each month\par \par 7/20/20\par Pt is here for follow up.\par No new complaints. Has been feeling well.\par No SOB, CP. \par No N, V, D.\par She has recd 2 units of PRBCs since May 20.\par \par 8/17/20\par Pt is here for a follow up visit.\par She is feeling well.\par No new symptoms. No N, V, D.\par No bruising or bleeding. She continues to need PRBCs every 2-3 weeks.\par \par 8/31/20\par Pt is here for follow up. She is feeling well. No N, V, D.\par She is tolerating the hydrea well. No SOB, CP\par No bruising ior bleeding\par \par 9/8/20\par Pt is here for follow up.\par She had been contacted by the [facility] last week to advise her to stop the hydrea. Pt did not check her messages and continued with Hydrea.\par No fever, N, V, bleeding.\par Saw Dr Ferrell last week.\par \par 9/14/20\par Pt is here for folow up.\par Besides her usual complaint of diarrhea she is feeling well.\par Recd 1 unit PRBC last week.\par Has stopped Hydrea.\par No fever, mouth sores.\par \par 9/29/20\par Pt is here for folow up.\par No new complaints.\par Is seeing GI for diarrhea net week.\par No fever, night sweats.\par REcd 3 units of PRBC this month\par \par 10/13/20\par Pt is here for follow up.\par No new complaints.\par Has not needed a transfusion since 3 weeks ago.\par Continues to have diarrhea, waiting to be seen by GI\par \par 10/26/20\par Pt is here for follow up.\par C/O feeling fatigued.\par Has CLARK.\par No nausea or vomiting.\par No fever.\par Diarrhea has improved a little. She is no longer on Metformin\par \par 11/9/20\par Pt is here for follow up.\par Feels well. Denies SOB, CP, fatigue\par \par \par 12/7/20- Patient is for follow up and is due for cycle 9 of Vidaza.  She still has loose BM sometimes 10 times a day and was seeing Dr. Corey from GI- did not follow up recently and is unable to get an appt with him. She has lost about 10 lbs since September. No N/V. No fever or chills. No CP/SOB. She has been getting PRBC transfusion every 3 weeks now\par \par 01/04/20 Pt presented today for f/u visit . She is s/p 8 cycles of vidaza and was started on Luspatercept in Dec , 2020 . So far she received 1 injection , she is due for 2nd injection today . Adverse effect profile was discussed with her in detail , she reports having some dizziness and weakness after first injection . She received blood transfusion last wk . Blood work from today reviewed as well and counts are adequate . She denies any fevers,  chills,  or any new symptoms . \par \par 1/25/21\par Pt is here for follow up.\par C/O diarrhea, otherwise feeling better.\par Has not needed a transfusion since 12/29\par \par 2/16/21\par Pt is here for follow up.\par Needed transfusion on 2/8/21.\par Continues to C/o fatigue. Diarrhea remains the same, has not made GI appt as yet.\par \par 3/8/21\par Pt is here for follow up.\par Feels better today. Recd 1 unit PRBCs last week.\par Diarrhea is sharan as before. has an appt with GI next week.\par No fever [de-identified] : \par

## 2021-03-08 NOTE — PHYSICAL EXAM
[Restricted in physically strenuous activity but ambulatory and able to carry out work of a light or sedentary nature] : Status 1- Restricted in physically strenuous activity but ambulatory and able to carry out work of a light or sedentary nature, e.g., light house work, office work [Normal] : affect appropriate [de-identified] : conjunctival pallor

## 2021-03-08 NOTE — ASSESSMENT
[FreeTextEntry1] : # Lowrisk myelodysplastic syndrome, previously treated with  Revlimid and Procrit, now on Vidaza . 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 \par Patient is s/p  9 cycles of Vidaza and she was requiring PRBC transfusion every 3 weeks \par Given the persistent transfusion dependence she was switched to Luspatercept\par # Vitamin  B12 deficiency.\par \par # Thrombocytosis improved; continue ASA. \par Hold Hydrea for now,. CBC weekly\par \par # Hyperkalemia likely due to increased plts; pseudohyperkalemia: \par \par Plan:\par Pt needed a PRBC transfusion last week\par will continue  Luspatercept 1.33mg/kg  today cycle # 5 \par \par Adverse effect profile was discussed with her in detail and she was told that they include but are not limited to Htn , abdominal pain , transaminitis , fatigue , weakness , arthralgias and muscle aches .\par Monitor with weekly CBC \par She is tolerating treatment well .\par Blood work from today was reviewed and showed counts adequate for treatment . \par . \par \par Continue with Procrit 80,000 units for now . \par Continue B12 injections monthly. \par \par Monitor CBC weekly with transfusions as needed if Hb less than 7\par GI eval\par RTC in 3 weeks .\par \par \par \par

## 2021-03-15 ENCOUNTER — APPOINTMENT (OUTPATIENT)
Dept: INFUSION THERAPY | Facility: CLINIC | Age: 76
End: 2021-03-15

## 2021-03-15 ENCOUNTER — LABORATORY RESULT (OUTPATIENT)
Age: 76
End: 2021-03-15

## 2021-03-15 LAB
HCT VFR BLD CALC: 26.4 %
HGB BLD-MCNC: 8.7 G/DL
MCHC RBC-ENTMCNC: 33 G/DL
MCHC RBC-ENTMCNC: 34.1 PG
MCV RBC AUTO: 103.5 FL
PLATELET # BLD AUTO: 622 K/UL
PMV BLD: 9.5 FL
RBC # BLD: 2.55 M/UL
RBC # FLD: 22.9 %
WBC # FLD AUTO: 8.21 K/UL

## 2021-03-15 RX ORDER — ERYTHROPOIETIN 10000 [IU]/ML
80000 INJECTION, SOLUTION INTRAVENOUS; SUBCUTANEOUS ONCE
Refills: 0 | Status: COMPLETED | OUTPATIENT
Start: 2021-03-15 | End: 2021-03-15

## 2021-03-15 RX ORDER — PREGABALIN 225 MG/1
1000 CAPSULE ORAL ONCE
Refills: 0 | Status: COMPLETED | OUTPATIENT
Start: 2021-03-15 | End: 2021-03-15

## 2021-03-15 RX ADMIN — PREGABALIN 1000 MICROGRAM(S): 225 CAPSULE ORAL at 12:05

## 2021-03-15 RX ADMIN — ERYTHROPOIETIN 80000 UNIT(S): 10000 INJECTION, SOLUTION INTRAVENOUS; SUBCUTANEOUS at 12:05

## 2021-03-22 ENCOUNTER — LABORATORY RESULT (OUTPATIENT)
Age: 76
End: 2021-03-22

## 2021-03-22 ENCOUNTER — APPOINTMENT (OUTPATIENT)
Dept: INFUSION THERAPY | Facility: CLINIC | Age: 76
End: 2021-03-22

## 2021-03-22 LAB
HCT VFR BLD CALC: 23.7 %
HGB BLD-MCNC: 7.9 G/DL
MCHC RBC-ENTMCNC: 33.3 G/DL
MCHC RBC-ENTMCNC: 35.1 PG
MCV RBC AUTO: 105.3 FL
PLATELET # BLD AUTO: 574 K/UL
PMV BLD: 9.5 FL
RBC # BLD: 2.25 M/UL
RBC # FLD: 22.6 %
WBC # FLD AUTO: 8.26 K/UL

## 2021-03-22 RX ORDER — ERYTHROPOIETIN 10000 [IU]/ML
80000 INJECTION, SOLUTION INTRAVENOUS; SUBCUTANEOUS ONCE
Refills: 0 | Status: COMPLETED | OUTPATIENT
Start: 2021-03-22 | End: 2021-03-22

## 2021-03-22 RX ADMIN — ERYTHROPOIETIN 80000 UNIT(S): 10000 INJECTION, SOLUTION INTRAVENOUS; SUBCUTANEOUS at 11:36

## 2021-03-26 ENCOUNTER — LABORATORY RESULT (OUTPATIENT)
Age: 76
End: 2021-03-26

## 2021-03-26 ENCOUNTER — OUTPATIENT (OUTPATIENT)
Dept: OUTPATIENT SERVICES | Facility: HOSPITAL | Age: 76
LOS: 1 days | Discharge: HOME | End: 2021-03-26

## 2021-03-26 DIAGNOSIS — Z11.59 ENCOUNTER FOR SCREENING FOR OTHER VIRAL DISEASES: ICD-10-CM

## 2021-03-30 ENCOUNTER — LABORATORY RESULT (OUTPATIENT)
Age: 76
End: 2021-03-30

## 2021-03-30 ENCOUNTER — APPOINTMENT (OUTPATIENT)
Dept: HEMATOLOGY ONCOLOGY | Facility: CLINIC | Age: 76
End: 2021-03-30
Payer: MEDICARE

## 2021-03-30 ENCOUNTER — APPOINTMENT (OUTPATIENT)
Dept: HEMATOLOGY ONCOLOGY | Facility: CLINIC | Age: 76
End: 2021-03-30

## 2021-03-30 ENCOUNTER — APPOINTMENT (OUTPATIENT)
Dept: INFUSION THERAPY | Facility: CLINIC | Age: 76
End: 2021-03-30
Payer: MEDICARE

## 2021-03-30 VITALS
TEMPERATURE: 97.6 F | HEART RATE: 109 BPM | HEIGHT: 62 IN | DIASTOLIC BLOOD PRESSURE: 51 MMHG | BODY MASS INDEX: 26.5 KG/M2 | SYSTOLIC BLOOD PRESSURE: 111 MMHG | WEIGHT: 144 LBS

## 2021-03-30 LAB
HCT VFR BLD CALC: 22 %
HGB BLD-MCNC: 7.1 G/DL
MCHC RBC-ENTMCNC: 32.3 G/DL
MCHC RBC-ENTMCNC: 34.8 PG
MCV RBC AUTO: 107.8 FL
PLATELET # BLD AUTO: 540 K/UL
PMV BLD: 10 FL
RBC # BLD: 2.04 M/UL
RBC # FLD: 23.5 %
WBC # FLD AUTO: 5.92 K/UL

## 2021-03-30 PROCEDURE — 99215 OFFICE O/P EST HI 40 MIN: CPT

## 2021-03-30 RX ORDER — ERYTHROPOIETIN 10000 [IU]/ML
80000 INJECTION, SOLUTION INTRAVENOUS; SUBCUTANEOUS ONCE
Refills: 0 | Status: COMPLETED | OUTPATIENT
Start: 2021-03-30 | End: 2021-03-30

## 2021-03-30 RX ORDER — LUSPATERCEPT 75 MG/1
80 INJECTION, POWDER, LYOPHILIZED, FOR SOLUTION SUBCUTANEOUS ONCE
Refills: 0 | Status: COMPLETED | OUTPATIENT
Start: 2021-03-30 | End: 2021-03-30

## 2021-03-30 RX ADMIN — LUSPATERCEPT 80 MILLIGRAM(S): 75 INJECTION, POWDER, LYOPHILIZED, FOR SOLUTION SUBCUTANEOUS at 11:50

## 2021-03-30 RX ADMIN — ERYTHROPOIETIN 80000 UNIT(S): 10000 INJECTION, SOLUTION INTRAVENOUS; SUBCUTANEOUS at 11:50

## 2021-03-30 NOTE — PHYSICAL EXAM
[Restricted in physically strenuous activity but ambulatory and able to carry out work of a light or sedentary nature] : Status 1- Restricted in physically strenuous activity but ambulatory and able to carry out work of a light or sedentary nature, e.g., light house work, office work [Normal] : affect appropriate [de-identified] : conjunctival pallor

## 2021-03-30 NOTE — HISTORY OF PRESENT ILLNESS
[de-identified] : She missed on appointment for procrit last week.\par She starts her next cycle on 6/25/18\par C/o back pain radiating down her left which occurred when she bent to  something.\par No change in diarrhea.\par \par 7/31/18:\par Doing well. On Revlimid for more than 2yrs, 5mg 2weeks on 1 week off. She will be starting week on tonight. \par C/o diarrhea. On Imodium 2 pills AM and 2 pills PM. Doesn't help much with diarrhea. \par C/o nausea, abd pain. \par \par \par Colonoscopy in 2016 was normal. \par Did not have blood transfusion for over 2 years. \par On procrit [id number] weekly. Missed last week on 7/24/18 as she was out of town. She has been non compliant with weekly Procrit.\par Mammogram in 2017 was normal. \par \par 9/11/18\par pt is here for follow up.\par She feels the lomotil helps with the diarrhea.\par She was away for a few weeks and missed her procrit injections.\par No fever, abdominal  discomfort has been less since since use of lomotil.\par She started a new cycle 2 days ago.\par \par 10/2/18:\par She will start Revlimid tonight (week on). 2 weeks on, 1 week off. \par On ASA 81mg. \par Denies fever, nausea, vomiting, chest pain, SOB, abdominal pain, and bladder problems.\par C/o diarrhea. \par S/p 2 units PRBC transfusion on 9/25/18. \par On Procrit 34126 units weekly. Not compliant every week. \par C/o intermittent dizziness. \par \par 10/31/18\par Pt is here for follow up.\par C/O significant fatigue.\par Continues to have diarrhea, takes imodium and lomotil as needed.\par C/o dry cough, no fever.\par Cough started a month ago. It is worse in the mornings however over last week it has been constant all day.\par No chest pain.\par She was found to have low B12 levels and was started on B12 injections.\par \par 11/27/18:\par Doing well. Hospitalized for 3 days for cellulitis/abcess of the back s/p drainage and on Abx currently. Developed 3 days after Mg infusion as per pt. \par Denies nausea, vomiting, chest pain, SOB, abdominal pain, bowel and bladder problems.\par This is the week on Revlimid (week 1).\par S/p 1 unit PRBC transfusion in the hospital. \par On Procrit weekly \par \par 12/27/18:\par Doing well. No major complaints.\par Denies fever, nausea, vomiting, chest pain, SOB, abdominal pain, and bladder problems.\par On Revlimid 5 mg 2 weeks on 1 week off. This is the week off. She will start the week on sunday (12/30/18).\par Due for Procrit 22449 units today. \par Still has diarrhea. 4-5bm/day.\par On monthly B12 injections. \par \par 1/30/19:\par Patient here for follow up for MDS.  She is taking Revlimid 5 mg, 2 weeks on, 1 week off.  She will complete this current cycle on Saturday.  She has no new complaints today.  Patient denies cough, shortness of breath, denies fever, denies bone pain.\par \par 03/04/2019\par Patient is here for a follow up visit. She complaints of severe pain in her left shoulder and has had abscess drained 2 weeks ago. However the pain is worse and she has purulent discharge on examination. She also has Nasal sores that are painful. Culture from 11/2018 showed MRSA.  Denies Fever. \par She also complaints of swelling in her right leg. Not tender and not erythematous and negative Gong;s sign. \par Her diarrhea with Revlimid is ongoing and Imodium and Lomotil helps but not everyday. \par She is on 60,000 units of procrit weekly and Revlimid 5 mg 2 weeks on 1 week off. \par \par 4/23/19\par Pt is here for follow up.\par She feels well.\par The nasal sores have improved with bactroban\par The abscess on left shoulder has resolved.\par However she has a new one on the middle of her back.\par No fever.\par Pt has been on procrit 29717 units weekly, however she missed a dose in between.\par \par 5/8/19\par pt is here for follow up.\par no new complaints.\par feels well.\par Her skin abscess has resolved.\par she has been unable to go to ID, as she could not get an appt.\par No bleeding.\par She is compliant with revlimid.\par \par 6/6/19\par Pt is here for follow up.\par no new complaints \par Feels well.\par Is on week 2 of her Revlimid cycle. \par No transfusions since last visit\par \par 7/17/19\par Pt is here for follow up.\par C/O fatigue.\par Was away for a few days two weeks ago, but missed treatment with procrit for 2 weeks.\par Is complaint with Revlimid 2 weeks on 1 week off.\par Diarrhea is a little improved.\par \par \par 8/14/19\par Patient here for follow up visit, feeling well.  Although she is complaining of some pain at the left scapula area recently.  Patient denies cough, shortness of breath, denies fever, denies other bone pain.  She is off Revlimid  this week, due to restart on Monday.  She is scheduled for Procrit and Vitamin B12 injection today.  She plans to leave the country tomorrow to visit her mother who is ill.  She will return in about 10 days.\par \par 9/11/19\par Pt is here for follow up.\par She was away for 3 weeks, out of which she took procrit for 2 weeks in [place].\par She missed last week's dose.\par She had CBCs in [place] which showed Hgb of 8.9\par She feels well.\par She still getting diarrhea.\par She has been using lomotil with very minimal relief.\par She started a new cycle of Revlimid 4 days ago.\par \par 10/3/19\par Patient here for follow up visit, feeling fairly well.   Patient denies cough, shortness of breath, denies fever, denies other bone pain.  She remains on Revlimid.  She is scheduled for Procrit and Vitamin B12 injection today.\par \par 10/24/19\par Pt is here for follow up.\par Feels well.\par No SOB, fatigue.\par Compliant with procrit and Revl;imid.\par Remains on ASa.\par Diarrhea is unchanged.\par Pt takes imodium as needed.\par \par 11/14/19\par Pt is here for follow up.\par No new complaints.\par Starts a new cycle of Revlimid today.\par Diarrhea is same as before, no rectal bleeding.\par No fever.\par \par 12/5/19\par Pt is here for follow up.\par No new complaints.\par Denies feeling weaker than usual.\par No fever, SOB.\par No change in frequency of diarrhea.\par No pain.\par Will start next cycle of Revlimid in 3 days.\par \par \par !/16/20\par Pt was recently DCed from [facility] 3 days ago after being treated for pneumonia and MARCO.\par She is on po Levaquin.\par She restarted Revlmid 3 days ago.\par She is feeling better now. No fever.\par No SOB, Chest pain and back pain has resolved.\par Pt has a cough, no expectoration.\par She also recd a unit of PRBCs last week in the hospital\par \par \par 1/30/20\par Patient here for follow up visit, feeling well.  She has no new complaints. Cough is almost gone completely.  Patient denies shortness of breath, denies fever, denies bone pain.  Her appetite is ok, weight is stable.  She is due to restart Revlimid on Monday.\par \par 02/20/20\par Pt is here for follow up, feeling well.\par Offers no new complaints. Denies SOB, fever, chills, weight loss, CP, cough. \par Currently off week of Revlimid 5 mg. Restarts on Monday.\par Has procrit 80,000 units today. Next b 12 injection due in 2 weeks \par Did not get chest Xray\par CBC reviewed WBC 3.1, h/h 8.3/25%, plt 386, neutrophils 1.29\par \par 3/12/20\par Pt is here for follow up.\par She took Revlimid for 2 weeks and then has been off for one week although she was advised to take it daily without break.\par No SOB, Cough, fever.\par Has mild fatigue.\par \par 04/02/2020\par SIMONA KUHN a 75 year F is here today for follow up visit of MDS.\par Denies fever, chills, cough,night sweats, weight loss. \par Denies bleeding or bruising.\par Pt receives procrit 80,000 units weekly and B12 IM monthly. \par Continued Revlimid 5 mg daily x 3 weeks, has 1 dose left tonight. \par CBC reviewed: WBC 3.2, H/H 8.1/25.2, neutrophils 1.6, PLT 72\par \par 4/20/2020: The patient is here for follow up . She has no complaints of fever, chills, dizziness , chest pain and shortness of breath . She is still with diarrhea , up to 10 watery BMs per day, responds poorly to imodium and lomotil. She is on Revlimid 5 mg daily , took last dose yesterday night. Her last Procrit was on April 13. \par \par 5/26/20\par Pt is here for follow up.\par She is feeling well. Denies SOB, chest pain, fever.\par Continues to have diarrhea although she is off of Revlmid.\par Thinks it may due to diabetic meds.\par Wants to discuss BM bx results\par \par 6/22/20\par Pt is here for follow up.\par Feels well. Denies any fever, N, V, D\par Continues to have diarrhea, she will talk to her PCP about changing metformin for DM.\par Has been needing PRBCs 1 unit each month\par \par 7/20/20\par Pt is here for follow up.\par No new complaints. Has been feeling well.\par No SOB, CP. \par No N, V, D.\par She has recd 2 units of PRBCs since May 20.\par \par 8/17/20\par Pt is here for a follow up visit.\par She is feeling well.\par No new symptoms. No N, V, D.\par No bruising or bleeding. She continues to need PRBCs every 2-3 weeks.\par \par 8/31/20\par Pt is here for follow up. She is feeling well. No N, V, D.\par She is tolerating the hydrea well. No SOB, CP\par No bruising ior bleeding\par \par 9/8/20\par Pt is here for follow up.\par She had been contacted by the [facility] last week to advise her to stop the hydrea. Pt did not check her messages and continued with Hydrea.\par No fever, N, V, bleeding.\par Saw Dr Ferrell last week.\par \par 9/14/20\par Pt is here for folow up.\par Besides her usual complaint of diarrhea she is feeling well.\par Recd 1 unit PRBC last week.\par Has stopped Hydrea.\par No fever, mouth sores.\par \par 9/29/20\par Pt is here for folow up.\par No new complaints.\par Is seeing GI for diarrhea net week.\par No fever, night sweats.\par REcd 3 units of PRBC this month\par \par 10/13/20\par Pt is here for follow up.\par No new complaints.\par Has not needed a transfusion since 3 weeks ago.\par Continues to have diarrhea, waiting to be seen by GI\par \par 10/26/20\par Pt is here for follow up.\par C/O feeling fatigued.\par Has CLARK.\par No nausea or vomiting.\par No fever.\par Diarrhea has improved a little. She is no longer on Metformin\par \par 11/9/20\par Pt is here for follow up.\par Feels well. Denies SOB, CP, fatigue\par \par \par 12/7/20- Patient is for follow up and is due for cycle 9 of Vidaza.  She still has loose BM sometimes 10 times a day and was seeing Dr. Corey from GI- did not follow up recently and is unable to get an appt with him. She has lost about 10 lbs since September. No N/V. No fever or chills. No CP/SOB. She has been getting PRBC transfusion every 3 weeks now\par \par 01/04/20 Pt presented today for f/u visit . She is s/p 8 cycles of vidaza and was started on Luspatercept in Dec , 2020 . So far she received 1 injection , she is due for 2nd injection today . Adverse effect profile was discussed with her in detail , she reports having some dizziness and weakness after first injection . She received blood transfusion last wk . Blood work from today reviewed as well and counts are adequate . She denies any fevers,  chills,  or any new symptoms . \par \par 1/25/21\par Pt is here for follow up.\par C/O diarrhea, otherwise feeling better.\par Has not needed a transfusion since 12/29\par \par 2/16/21\par Pt is here for follow up.\par Needed transfusion on 2/8/21.\par Continues to C/o fatigue. Diarrhea remains the same, has not made GI appt as yet.\par \par 3/8/21\par Pt is here for follow up.\par Feels better today. Recd 1 unit PRBCs last week.\par Diarrhea is same as before. has an appt with GI next week.\par No fever\par \par 3/30/21\par Pt is here for follow up.\par Feels better. Last transfusion was on 3/2/21\par Saw GI and was started on cholestyramine and metformin was DCED with resolution of diarrhea.\par She denies any SOB,\par Fatigue is better\par  [de-identified] : \par

## 2021-03-30 NOTE — ASSESSMENT
[FreeTextEntry1] : # Lowrisk myelodysplastic syndrome, previously treated with  Revlimid and Procrit, now on Vidaza . 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 \par Patient is s/p  9 cycles of Vidaza and she was requiring PRBC transfusion every 3 weeks \par Given the persistent transfusion dependence she was switched to Luspatercept\par # Vitamin  B12 deficiency.\par \par # Thrombocytosis improved; continue ASA. \par Hold Hydrea for now,. CBC weekly\par \par # Hyperkalemia likely due to increased plts; pseudohyperkalemia: \par \par Plan:\par Pt needed a PRBC transfusion last week\par will continue  Luspatercept 1.33mg/kg  today cycle # 6 \par \par Adverse effect profile was discussed with her in detail and she was told that they include but are not limited to Htn , abdominal pain , transaminitis , fatigue , weakness , arthralgias and muscle aches .\par Monitor with weekly CBC \par She is tolerating treatment well .\par Blood work from today was reviewed and showed counts adequate for treatment . \par . \par \par Continue with Procrit 80,000 units for now . \par Continue B12 injections monthly. \par \par Monitor CBC weekly with transfusions as needed if Hb less than 7\par \par RTC in 3 weeks .\par \par \par \par

## 2021-04-01 ENCOUNTER — APPOINTMENT (OUTPATIENT)
Dept: GASTROENTEROLOGY | Facility: CLINIC | Age: 76
End: 2021-04-01

## 2021-04-06 ENCOUNTER — LABORATORY RESULT (OUTPATIENT)
Age: 76
End: 2021-04-06

## 2021-04-06 ENCOUNTER — APPOINTMENT (OUTPATIENT)
Dept: INFUSION THERAPY | Facility: CLINIC | Age: 76
End: 2021-04-06

## 2021-04-06 LAB
HCT VFR BLD CALC: 20.8 %
HGB BLD-MCNC: 6.9 G/DL
MCHC RBC-ENTMCNC: 33.2 G/DL
MCHC RBC-ENTMCNC: 35.8 PG
MCV RBC AUTO: 107.8 FL
PLATELET # BLD AUTO: 491 K/UL
PMV BLD: 10.8 FL
RBC # BLD: 1.93 M/UL
RBC # FLD: 23.3 %
WBC # FLD AUTO: 7.79 K/UL

## 2021-04-06 RX ORDER — ERYTHROPOIETIN 10000 [IU]/ML
80000 INJECTION, SOLUTION INTRAVENOUS; SUBCUTANEOUS ONCE
Refills: 0 | Status: COMPLETED | OUTPATIENT
Start: 2021-04-06 | End: 2021-04-06

## 2021-04-06 RX ADMIN — ERYTHROPOIETIN 80000 UNIT(S): 10000 INJECTION, SOLUTION INTRAVENOUS; SUBCUTANEOUS at 14:04

## 2021-04-07 ENCOUNTER — APPOINTMENT (OUTPATIENT)
Dept: INFUSION THERAPY | Facility: CLINIC | Age: 76
End: 2021-04-07

## 2021-04-07 LAB
ABO + RH PNL BLD: NORMAL
BLD GP AB SCN SERPL QL: NORMAL

## 2021-04-12 ENCOUNTER — APPOINTMENT (OUTPATIENT)
Dept: INFUSION THERAPY | Facility: CLINIC | Age: 76
End: 2021-04-12

## 2021-04-12 ENCOUNTER — LABORATORY RESULT (OUTPATIENT)
Age: 76
End: 2021-04-12

## 2021-04-12 LAB
HCT VFR BLD CALC: 26.3 %
HGB BLD-MCNC: 8.8 G/DL
MCHC RBC-ENTMCNC: 32.7 PG
MCHC RBC-ENTMCNC: 33.5 G/DL
MCV RBC AUTO: 97.8 FL
PLATELET # BLD AUTO: 534 K/UL
PMV BLD: 10.7 FL
RBC # BLD: 2.69 M/UL
RBC # FLD: 24.8 %
WBC # FLD AUTO: 9.29 K/UL

## 2021-04-12 RX ORDER — PREGABALIN 225 MG/1
1000 CAPSULE ORAL ONCE
Refills: 0 | Status: COMPLETED | OUTPATIENT
Start: 2021-04-12 | End: 2021-04-12

## 2021-04-12 RX ORDER — ERYTHROPOIETIN 10000 [IU]/ML
80000 INJECTION, SOLUTION INTRAVENOUS; SUBCUTANEOUS ONCE
Refills: 0 | Status: COMPLETED | OUTPATIENT
Start: 2021-04-12 | End: 2021-04-12

## 2021-04-12 RX ADMIN — ERYTHROPOIETIN 80000 UNIT(S): 10000 INJECTION, SOLUTION INTRAVENOUS; SUBCUTANEOUS at 12:52

## 2021-04-12 RX ADMIN — PREGABALIN 1000 MICROGRAM(S): 225 CAPSULE ORAL at 12:52

## 2021-04-20 ENCOUNTER — LABORATORY RESULT (OUTPATIENT)
Age: 76
End: 2021-04-20

## 2021-04-20 ENCOUNTER — APPOINTMENT (OUTPATIENT)
Dept: INFUSION THERAPY | Facility: CLINIC | Age: 76
End: 2021-04-20
Payer: MEDICARE

## 2021-04-20 ENCOUNTER — APPOINTMENT (OUTPATIENT)
Dept: HEMATOLOGY ONCOLOGY | Facility: CLINIC | Age: 76
End: 2021-04-20
Payer: MEDICARE

## 2021-04-20 VITALS
WEIGHT: 138 LBS | RESPIRATION RATE: 16 BRPM | HEIGHT: 62 IN | HEART RATE: 93 BPM | SYSTOLIC BLOOD PRESSURE: 140 MMHG | DIASTOLIC BLOOD PRESSURE: 64 MMHG | TEMPERATURE: 97.9 F | BODY MASS INDEX: 25.4 KG/M2

## 2021-04-20 LAB
HCT VFR BLD CALC: 24 %
HGB BLD-MCNC: 7.9 G/DL
MCHC RBC-ENTMCNC: 32.9 G/DL
MCHC RBC-ENTMCNC: 33.3 PG
MCV RBC AUTO: 101.3 FL
PLATELET # BLD AUTO: 651 K/UL
PMV BLD: 9 FL
RBC # BLD: 2.37 M/UL
RBC # FLD: 24.5 %
WBC # FLD AUTO: 8.38 K/UL

## 2021-04-20 PROCEDURE — 99215 OFFICE O/P EST HI 40 MIN: CPT

## 2021-04-20 RX ORDER — ERYTHROPOIETIN 10000 [IU]/ML
80000 INJECTION, SOLUTION INTRAVENOUS; SUBCUTANEOUS ONCE
Refills: 0 | Status: COMPLETED | OUTPATIENT
Start: 2021-04-20 | End: 2021-04-20

## 2021-04-20 RX ORDER — LUSPATERCEPT 75 MG/1
110 INJECTION, POWDER, LYOPHILIZED, FOR SOLUTION SUBCUTANEOUS ONCE
Refills: 0 | Status: COMPLETED | OUTPATIENT
Start: 2021-04-20 | End: 2021-04-20

## 2021-04-20 RX ADMIN — LUSPATERCEPT 110 MILLIGRAM(S): 75 INJECTION, POWDER, LYOPHILIZED, FOR SOLUTION SUBCUTANEOUS at 13:32

## 2021-04-20 RX ADMIN — ERYTHROPOIETIN 80000 UNIT(S): 10000 INJECTION, SOLUTION INTRAVENOUS; SUBCUTANEOUS at 13:31

## 2021-04-23 NOTE — HISTORY OF PRESENT ILLNESS
[de-identified] : She missed on appointment for procrit last week.\par She starts her next cycle on 6/25/18\par C/o back pain radiating down her left which occurred when she bent to  something.\par No change in diarrhea.\par \par 7/31/18:\par Doing well. On Revlimid for more than 2yrs, 5mg 2weeks on 1 week off. She will be starting week on tonight. \par C/o diarrhea. On Imodium 2 pills AM and 2 pills PM. Doesn't help much with diarrhea. \par C/o nausea, abd pain. \par \par \par Colonoscopy in 2016 was normal. \par Did not have blood transfusion for over 2 years. \par On procrit [id number] weekly. Missed last week on 7/24/18 as she was out of town. She has been non compliant with weekly Procrit.\par Mammogram in 2017 was normal. \par \par 9/11/18\par pt is here for follow up.\par She feels the lomotil helps with the diarrhea.\par She was away for a few weeks and missed her procrit injections.\par No fever, abdominal  discomfort has been less since since use of lomotil.\par She started a new cycle 2 days ago.\par \par 10/2/18:\par She will start Revlimid tonight (week on). 2 weeks on, 1 week off. \par On ASA 81mg. \par Denies fever, nausea, vomiting, chest pain, SOB, abdominal pain, and bladder problems.\par C/o diarrhea. \par S/p 2 units PRBC transfusion on 9/25/18. \par On Procrit 32107 units weekly. Not compliant every week. \par C/o intermittent dizziness. \par \par 10/31/18\par Pt is here for follow up.\par C/O significant fatigue.\par Continues to have diarrhea, takes imodium and lomotil as needed.\par C/o dry cough, no fever.\par Cough started a month ago. It is worse in the mornings however over last week it has been constant all day.\par No chest pain.\par She was found to have low B12 levels and was started on B12 injections.\par \par 11/27/18:\par Doing well. Hospitalized for 3 days for cellulitis/abcess of the back s/p drainage and on Abx currently. Developed 3 days after Mg infusion as per pt. \par Denies nausea, vomiting, chest pain, SOB, abdominal pain, bowel and bladder problems.\par This is the week on Revlimid (week 1).\par S/p 1 unit PRBC transfusion in the hospital. \par On Procrit weekly \par \par 12/27/18:\par Doing well. No major complaints.\par Denies fever, nausea, vomiting, chest pain, SOB, abdominal pain, and bladder problems.\par On Revlimid 5 mg 2 weeks on 1 week off. This is the week off. She will start the week on sunday (12/30/18).\par Due for Procrit 88854 units today. \par Still has diarrhea. 4-5bm/day.\par On monthly B12 injections. \par \par 1/30/19:\par Patient here for follow up for MDS.  She is taking Revlimid 5 mg, 2 weeks on, 1 week off.  She will complete this current cycle on Saturday.  She has no new complaints today.  Patient denies cough, shortness of breath, denies fever, denies bone pain.\par \par 03/04/2019\par Patient is here for a follow up visit. She complaints of severe pain in her left shoulder and has had abscess drained 2 weeks ago. However the pain is worse and she has purulent discharge on examination. She also has Nasal sores that are painful. Culture from 11/2018 showed MRSA.  Denies Fever. \par She also complaints of swelling in her right leg. Not tender and not erythematous and negative Gong;s sign. \par Her diarrhea with Revlimid is ongoing and Imodium and Lomotil helps but not everyday. \par She is on 60,000 units of procrit weekly and Revlimid 5 mg 2 weeks on 1 week off. \par \par 4/23/19\par Pt is here for follow up.\par She feels well.\par The nasal sores have improved with bactroban\par The abscess on left shoulder has resolved.\par However she has a new one on the middle of her back.\par No fever.\par Pt has been on procrit 63409 units weekly, however she missed a dose in between.\par \par 5/8/19\par pt is here for follow up.\par no new complaints.\par feels well.\par Her skin abscess has resolved.\par she has been unable to go to ID, as she could not get an appt.\par No bleeding.\par She is compliant with revlimid.\par \par 6/6/19\par Pt is here for follow up.\par no new complaints \par Feels well.\par Is on week 2 of her Revlimid cycle. \par No transfusions since last visit\par \par 7/17/19\par Pt is here for follow up.\par C/O fatigue.\par Was away for a few days two weeks ago, but missed treatment with procrit for 2 weeks.\par Is complaint with Revlimid 2 weeks on 1 week off.\par Diarrhea is a little improved.\par \par \par 8/14/19\par Patient here for follow up visit, feeling well.  Although she is complaining of some pain at the left scapula area recently.  Patient denies cough, shortness of breath, denies fever, denies other bone pain.  She is off Revlimid  this week, due to restart on Monday.  She is scheduled for Procrit and Vitamin B12 injection today.  She plans to leave the country tomorrow to visit her mother who is ill.  She will return in about 10 days.\par \par 9/11/19\par Pt is here for follow up.\par She was away for 3 weeks, out of which she took procrit for 2 weeks in [place].\par She missed last week's dose.\par She had CBCs in [place] which showed Hgb of 8.9\par She feels well.\par She still getting diarrhea.\par She has been using lomotil with very minimal relief.\par She started a new cycle of Revlimid 4 days ago.\par \par 10/3/19\par Patient here for follow up visit, feeling fairly well.   Patient denies cough, shortness of breath, denies fever, denies other bone pain.  She remains on Revlimid.  She is scheduled for Procrit and Vitamin B12 injection today.\par \par 10/24/19\par Pt is here for follow up.\par Feels well.\par No SOB, fatigue.\par Compliant with procrit and Revl;imid.\par Remains on ASa.\par Diarrhea is unchanged.\par Pt takes imodium as needed.\par \par 11/14/19\par Pt is here for follow up.\par No new complaints.\par Starts a new cycle of Revlimid today.\par Diarrhea is same as before, no rectal bleeding.\par No fever.\par \par 12/5/19\par Pt is here for follow up.\par No new complaints.\par Denies feeling weaker than usual.\par No fever, SOB.\par No change in frequency of diarrhea.\par No pain.\par Will start next cycle of Revlimid in 3 days.\par \par \par !/16/20\par Pt was recently DCed from [facility] 3 days ago after being treated for pneumonia and MARCO.\par She is on po Levaquin.\par She restarted Revlmid 3 days ago.\par She is feeling better now. No fever.\par No SOB, Chest pain and back pain has resolved.\par Pt has a cough, no expectoration.\par She also recd a unit of PRBCs last week in the hospital\par \par \par 1/30/20\par Patient here for follow up visit, feeling well.  She has no new complaints. Cough is almost gone completely.  Patient denies shortness of breath, denies fever, denies bone pain.  Her appetite is ok, weight is stable.  She is due to restart Revlimid on Monday.\par \par 02/20/20\par Pt is here for follow up, feeling well.\par Offers no new complaints. Denies SOB, fever, chills, weight loss, CP, cough. \par Currently off week of Revlimid 5 mg. Restarts on Monday.\par Has procrit 80,000 units today. Next b 12 injection due in 2 weeks \par Did not get chest Xray\par CBC reviewed WBC 3.1, h/h 8.3/25%, plt 386, neutrophils 1.29\par \par 3/12/20\par Pt is here for follow up.\par She took Revlimid for 2 weeks and then has been off for one week although she was advised to take it daily without break.\par No SOB, Cough, fever.\par Has mild fatigue.\par \par 04/02/2020\par SIMONA KUHN a 75 year F is here today for follow up visit of MDS.\par Denies fever, chills, cough,night sweats, weight loss. \par Denies bleeding or bruising.\par Pt receives procrit 80,000 units weekly and B12 IM monthly. \par Continued Revlimid 5 mg daily x 3 weeks, has 1 dose left tonight. \par CBC reviewed: WBC 3.2, H/H 8.1/25.2, neutrophils 1.6, PLT 72\par \par 4/20/2020: The patient is here for follow up . She has no complaints of fever, chills, dizziness , chest pain and shortness of breath . She is still with diarrhea , up to 10 watery BMs per day, responds poorly to imodium and lomotil. She is on Revlimid 5 mg daily , took last dose yesterday night. Her last Procrit was on April 13. \par \par 5/26/20\par Pt is here for follow up.\par She is feeling well. Denies SOB, chest pain, fever.\par Continues to have diarrhea although she is off of Revlmid.\par Thinks it may due to diabetic meds.\par Wants to discuss BM bx results\par \par 6/22/20\par Pt is here for follow up.\par Feels well. Denies any fever, N, V, D\par Continues to have diarrhea, she will talk to her PCP about changing metformin for DM.\par Has been needing PRBCs 1 unit each month\par \par 7/20/20\par Pt is here for follow up.\par No new complaints. Has been feeling well.\par No SOB, CP. \par No N, V, D.\par She has recd 2 units of PRBCs since May 20.\par \par 8/17/20\par Pt is here for a follow up visit.\par She is feeling well.\par No new symptoms. No N, V, D.\par No bruising or bleeding. She continues to need PRBCs every 2-3 weeks.\par \par 8/31/20\par Pt is here for follow up. She is feeling well. No N, V, D.\par She is tolerating the hydrea well. No SOB, CP\par No bruising ior bleeding\par \par 9/8/20\par Pt is here for follow up.\par She had been contacted by the [facility] last week to advise her to stop the hydrea. Pt did not check her messages and continued with Hydrea.\par No fever, N, V, bleeding.\par Saw Dr Ferrell last week.\par \par 9/14/20\par Pt is here for folow up.\par Besides her usual complaint of diarrhea she is feeling well.\par Recd 1 unit PRBC last week.\par Has stopped Hydrea.\par No fever, mouth sores.\par \par 9/29/20\par Pt is here for folow up.\par No new complaints.\par Is seeing GI for diarrhea net week.\par No fever, night sweats.\par REcd 3 units of PRBC this month\par \par 10/13/20\par Pt is here for follow up.\par No new complaints.\par Has not needed a transfusion since 3 weeks ago.\par Continues to have diarrhea, waiting to be seen by GI\par \par 10/26/20\par Pt is here for follow up.\par C/O feeling fatigued.\par Has CLARK.\par No nausea or vomiting.\par No fever.\par Diarrhea has improved a little. She is no longer on Metformin\par \par 11/9/20\par Pt is here for follow up.\par Feels well. Denies SOB, CP, fatigue\par \par \par 12/7/20- Patient is for follow up and is due for cycle 9 of Vidaza.  She still has loose BM sometimes 10 times a day and was seeing Dr. Corey from GI- did not follow up recently and is unable to get an appt with him. She has lost about 10 lbs since September. No N/V. No fever or chills. No CP/SOB. She has been getting PRBC transfusion every 3 weeks now\par \par 01/04/20 Pt presented today for f/u visit . She is s/p 8 cycles of vidaza and was started on Luspatercept in Dec , 2020 . So far she received 1 injection , she is due for 2nd injection today . Adverse effect profile was discussed with her in detail , she reports having some dizziness and weakness after first injection . She received blood transfusion last wk . Blood work from today reviewed as well and counts are adequate . She denies any fevers,  chills,  or any new symptoms . \par \par 1/25/21\par Pt is here for follow up.\par C/O diarrhea, otherwise feeling better.\par Has not needed a transfusion since 12/29\par \par 2/16/21\par Pt is here for follow up.\par Needed transfusion on 2/8/21.\par Continues to C/o fatigue. Diarrhea remains the same, has not made GI appt as yet.\par \par 3/8/21\par Pt is here for follow up.\par Feels better today. Recd 1 unit PRBCs last week.\par Diarrhea is same as before. has an appt with GI next week.\par No fever\par \par 3/30/21\par Pt is here for follow up.\par Feels better. Last transfusion was on 3/2/21\par Saw GI and was started on cholestyramine and metformin was DCED with resolution of diarrhea.\par She denies any SOB,\par Fatigue is better\par \par 4/20/2021\par Pt is here to f/u for MDS\par She is s/p Luspatercept cycle #6\par She is compliant with Aspirin\par She feels better today, appetite is good\par She denies shortness of breath, fatigue, or weakness \par She c/o of diarrhea but it has improved since she was started on Cholestyramine. Stool is soft and less in frequency\par She received COVID vaccine x 2 doses\par  [de-identified] : This is a 75 year old Black female with history of MDS. She's been on treatment with Revlimid 5 mg, 2 weeks on, 1 week off.  \par She is also on Procrit.\par  She underwent colonoscopy (2/2017)  Results noted no colitis, only hyperplastic polyp noted. \par  \par She has C/O abdominal pain and passage of mucus per rectum.\par She is planning to follow with her GI.\par Other than that she feels well.\par She is compliant with Revlimid .

## 2021-04-23 NOTE — PHYSICAL EXAM
[Restricted in physically strenuous activity but ambulatory and able to carry out work of a light or sedentary nature] : Status 1- Restricted in physically strenuous activity but ambulatory and able to carry out work of a light or sedentary nature, e.g., light house work, office work [Normal] : affect appropriate [de-identified] : conjunctival pallor

## 2021-04-23 NOTE — ASSESSMENT
[FreeTextEntry1] : # Lowrisk myelodysplastic syndrome, previously treated with  Revlimid and Procrit, now on Vidaza . 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 \par Patient is s/p  9 cycles of Vidaza and she was requiring pRBC transfusion every 3 weeks \par Given the persistent transfusion dependence she was switched to Luspatercept\par \par Hyperkalemia likely due to increased platelet; pseudohyperkalemia.\par \par Plan:\par Previous notes and lab results reviewed by Dr. Wade prior to the visit and explained to the patient.\par \par \par Pt has been receiving pRBC transfusion once a month.\par Will increase Luspatercept @ 1.75 mg/kg  today cycle #7\par \par Adverse effect profile was discussed with her in detail and she was told that they include but are not limited to Hypertension, abdominal pain, transaminitis, fatigue, weakness, arthralgias and muscle aches .\par \par Continue with Procrit 80,000 units for now\par Monitor CBC weekly with transfusions as needed if Hb less than 7 g/dL\par \par # Vitamin  B12 deficiency\par - Continue Vitamin B12 injection every month\par \par # Thrombocytosis\par - Continue ASA. \par - Hold Hydrea for now\par - Will continue to monitor CBC\par \par RTC in 3 weeks\par \par Case was seen and discussed with Dr. Wade who agreed with assessment and plan.\par \par \par \par \par

## 2021-04-23 NOTE — REVIEW OF SYSTEMS
[Diarrhea] : diarrhea [Negative] : Allergic/Immunologic [Recent Change In Weight] : ~T no recent weight change [Shortness Of Breath] : no shortness of breath

## 2021-04-26 ENCOUNTER — APPOINTMENT (OUTPATIENT)
Dept: INFUSION THERAPY | Facility: CLINIC | Age: 76
End: 2021-04-26

## 2021-04-26 ENCOUNTER — LABORATORY RESULT (OUTPATIENT)
Age: 76
End: 2021-04-26

## 2021-04-26 LAB
HCT VFR BLD CALC: 25 %
HGB BLD-MCNC: 8.2 G/DL
MCHC RBC-ENTMCNC: 32.8 G/DL
MCHC RBC-ENTMCNC: 33.2 PG
MCV RBC AUTO: 101.2 FL
PLATELET # BLD AUTO: 219 K/UL
PMV BLD: 11.2 FL
RBC # BLD: 2.47 M/UL
RBC # FLD: NORMAL %
WBC # FLD AUTO: 8.59 K/UL

## 2021-04-26 RX ORDER — ERYTHROPOIETIN 10000 [IU]/ML
80000 INJECTION, SOLUTION INTRAVENOUS; SUBCUTANEOUS ONCE
Refills: 0 | Status: COMPLETED | OUTPATIENT
Start: 2021-04-26 | End: 2021-04-26

## 2021-04-26 RX ADMIN — ERYTHROPOIETIN 80000 UNIT(S): 10000 INJECTION, SOLUTION INTRAVENOUS; SUBCUTANEOUS at 11:32

## 2021-05-03 ENCOUNTER — LABORATORY RESULT (OUTPATIENT)
Age: 76
End: 2021-05-03

## 2021-05-03 ENCOUNTER — APPOINTMENT (OUTPATIENT)
Dept: INFUSION THERAPY | Facility: CLINIC | Age: 76
End: 2021-05-03

## 2021-05-03 LAB
HCT VFR BLD CALC: 22.2 %
HGB BLD-MCNC: 7.4 G/DL
MCHC RBC-ENTMCNC: 33.3 G/DL
MCHC RBC-ENTMCNC: 34.3 PG
MCV RBC AUTO: 102.8 FL
PLATELET # BLD AUTO: 590 K/UL
PMV BLD: 10.2 FL
RBC # BLD: 2.16 M/UL
RBC # FLD: NORMAL %
WBC # FLD AUTO: 9.5 K/UL

## 2021-05-03 RX ORDER — ERYTHROPOIETIN 10000 [IU]/ML
80000 INJECTION, SOLUTION INTRAVENOUS; SUBCUTANEOUS ONCE
Refills: 0 | Status: COMPLETED | OUTPATIENT
Start: 2021-05-03 | End: 2021-05-03

## 2021-05-03 RX ADMIN — ERYTHROPOIETIN 80000 UNIT(S): 10000 INJECTION, SOLUTION INTRAVENOUS; SUBCUTANEOUS at 14:47

## 2021-05-04 LAB
ABO + RH PNL BLD: NORMAL
BLD GP AB SCN SERPL QL: NORMAL

## 2021-05-11 ENCOUNTER — APPOINTMENT (OUTPATIENT)
Dept: INFUSION THERAPY | Facility: CLINIC | Age: 76
End: 2021-05-11
Payer: MEDICARE

## 2021-05-11 ENCOUNTER — LABORATORY RESULT (OUTPATIENT)
Age: 76
End: 2021-05-11

## 2021-05-11 ENCOUNTER — APPOINTMENT (OUTPATIENT)
Dept: HEMATOLOGY ONCOLOGY | Facility: CLINIC | Age: 76
End: 2021-05-11
Payer: MEDICARE

## 2021-05-11 VITALS
WEIGHT: 136 LBS | TEMPERATURE: 96.6 F | BODY MASS INDEX: 25.03 KG/M2 | HEART RATE: 124 BPM | SYSTOLIC BLOOD PRESSURE: 121 MMHG | HEIGHT: 62 IN | RESPIRATION RATE: 16 BRPM | DIASTOLIC BLOOD PRESSURE: 58 MMHG

## 2021-05-11 LAB
HCT VFR BLD CALC: 20.4 %
HGB BLD-MCNC: 6.9 G/DL
MCHC RBC-ENTMCNC: 33.8 G/DL
MCHC RBC-ENTMCNC: 35.4 PG
MCV RBC AUTO: 104.6 FL
PLATELET # BLD AUTO: 601 K/UL
PMV BLD: 10.5 FL
RBC # BLD: 1.95 M/UL
RBC # FLD: NORMAL %
WBC # FLD AUTO: 10.25 K/UL

## 2021-05-11 PROCEDURE — 99215 OFFICE O/P EST HI 40 MIN: CPT

## 2021-05-11 RX ORDER — ERYTHROPOIETIN 10000 [IU]/ML
80000 INJECTION, SOLUTION INTRAVENOUS; SUBCUTANEOUS ONCE
Refills: 0 | Status: COMPLETED | OUTPATIENT
Start: 2021-05-11 | End: 2021-05-11

## 2021-05-11 RX ORDER — LUSPATERCEPT 75 MG/1
110 INJECTION, POWDER, LYOPHILIZED, FOR SOLUTION SUBCUTANEOUS ONCE
Refills: 0 | Status: COMPLETED | OUTPATIENT
Start: 2021-05-11 | End: 2021-05-11

## 2021-05-11 RX ORDER — PREGABALIN 225 MG/1
1000 CAPSULE ORAL ONCE
Refills: 0 | Status: COMPLETED | OUTPATIENT
Start: 2021-05-11 | End: 2021-05-11

## 2021-05-11 RX ADMIN — PREGABALIN 1000 MICROGRAM(S): 225 CAPSULE ORAL at 14:53

## 2021-05-11 RX ADMIN — LUSPATERCEPT 110 MILLIGRAM(S): 75 INJECTION, POWDER, LYOPHILIZED, FOR SOLUTION SUBCUTANEOUS at 14:54

## 2021-05-11 RX ADMIN — ERYTHROPOIETIN 80000 UNIT(S): 10000 INJECTION, SOLUTION INTRAVENOUS; SUBCUTANEOUS at 14:53

## 2021-05-12 ENCOUNTER — APPOINTMENT (OUTPATIENT)
Dept: INFUSION THERAPY | Facility: CLINIC | Age: 76
End: 2021-05-12

## 2021-05-17 ENCOUNTER — APPOINTMENT (OUTPATIENT)
Dept: INFUSION THERAPY | Facility: CLINIC | Age: 76
End: 2021-05-17

## 2021-05-17 ENCOUNTER — LABORATORY RESULT (OUTPATIENT)
Age: 76
End: 2021-05-17

## 2021-05-17 LAB
ABO + RH PNL BLD: NORMAL
ALBUMIN SERPL ELPH-MCNC: 4.6 G/DL
ALP BLD-CCNC: 116 U/L
ALT SERPL-CCNC: 13 U/L
ANION GAP SERPL CALC-SCNC: 16 MMOL/L
AST SERPL-CCNC: 12 U/L
BILIRUB SERPL-MCNC: 0.8 MG/DL
BLD GP AB SCN SERPL QL: NORMAL
BUN SERPL-MCNC: 27 MG/DL
CALCIUM SERPL-MCNC: 9 MG/DL
CHLORIDE SERPL-SCNC: 99 MMOL/L
CO2 SERPL-SCNC: 18 MMOL/L
CREAT SERPL-MCNC: 1.4 MG/DL
GLUCOSE SERPL-MCNC: 343 MG/DL
HCT VFR BLD CALC: 24.3 %
HGB BLD-MCNC: 8.2 G/DL
MCHC RBC-ENTMCNC: 32.7 PG
MCHC RBC-ENTMCNC: 33.7 G/DL
MCV RBC AUTO: 96.8 FL
PLATELET # BLD AUTO: 311 K/UL
PMV BLD: 11.9 FL
POTASSIUM SERPL-SCNC: 5.7 MMOL/L
PROT SERPL-MCNC: 6.3 G/DL
RBC # BLD: 2.51 M/UL
RBC # FLD: 23.1 %
SODIUM SERPL-SCNC: 133 MMOL/L
WBC # FLD AUTO: 7.34 K/UL

## 2021-05-17 RX ORDER — ERYTHROPOIETIN 10000 [IU]/ML
80000 INJECTION, SOLUTION INTRAVENOUS; SUBCUTANEOUS ONCE
Refills: 0 | Status: COMPLETED | OUTPATIENT
Start: 2021-05-17 | End: 2021-05-17

## 2021-05-17 RX ADMIN — ERYTHROPOIETIN 80000 UNIT(S): 10000 INJECTION, SOLUTION INTRAVENOUS; SUBCUTANEOUS at 11:46

## 2021-05-22 NOTE — HISTORY OF PRESENT ILLNESS
[de-identified] : She missed on appointment for procrit last week.\par She starts her next cycle on 6/25/18\par C/o back pain radiating down her left which occurred when she bent to  something.\par No change in diarrhea.\par \par 7/31/18:\par Doing well. On Revlimid for more than 2yrs, 5mg 2weeks on 1 week off. She will be starting week on tonight. \par C/o diarrhea. On Imodium 2 pills AM and 2 pills PM. Doesn't help much with diarrhea. \par C/o nausea, abd pain. \par \par \par Colonoscopy in 2016 was normal. \par Did not have blood transfusion for over 2 years. \par On procrit [id number] weekly. Missed last week on 7/24/18 as she was out of town. She has been non compliant with weekly Procrit.\par Mammogram in 2017 was normal. \par \par 9/11/18\par pt is here for follow up.\par She feels the lomotil helps with the diarrhea.\par She was away for a few weeks and missed her procrit injections.\par No fever, abdominal  discomfort has been less since since use of lomotil.\par She started a new cycle 2 days ago.\par \par 10/2/18:\par She will start Revlimid tonight (week on). 2 weeks on, 1 week off. \par On ASA 81mg. \par Denies fever, nausea, vomiting, chest pain, SOB, abdominal pain, and bladder problems.\par C/o diarrhea. \par S/p 2 units PRBC transfusion on 9/25/18. \par On Procrit 94635 units weekly. Not compliant every week. \par C/o intermittent dizziness. \par \par 10/31/18\par Pt is here for follow up.\par C/O significant fatigue.\par Continues to have diarrhea, takes imodium and lomotil as needed.\par C/o dry cough, no fever.\par Cough started a month ago. It is worse in the mornings however over last week it has been constant all day.\par No chest pain.\par She was found to have low B12 levels and was started on B12 injections.\par \par 11/27/18:\par Doing well. Hospitalized for 3 days for cellulitis/abcess of the back s/p drainage and on Abx currently. Developed 3 days after Mg infusion as per pt. \par Denies nausea, vomiting, chest pain, SOB, abdominal pain, bowel and bladder problems.\par This is the week on Revlimid (week 1).\par S/p 1 unit PRBC transfusion in the hospital. \par On Procrit weekly \par \par 12/27/18:\par Doing well. No major complaints.\par Denies fever, nausea, vomiting, chest pain, SOB, abdominal pain, and bladder problems.\par On Revlimid 5 mg 2 weeks on 1 week off. This is the week off. She will start the week on sunday (12/30/18).\par Due for Procrit 45380 units today. \par Still has diarrhea. 4-5bm/day.\par On monthly B12 injections. \par \par 1/30/19:\par Patient here for follow up for MDS.  She is taking Revlimid 5 mg, 2 weeks on, 1 week off.  She will complete this current cycle on Saturday.  She has no new complaints today.  Patient denies cough, shortness of breath, denies fever, denies bone pain.\par \par 03/04/2019\par Patient is here for a follow up visit. She complaints of severe pain in her left shoulder and has had abscess drained 2 weeks ago. However the pain is worse and she has purulent discharge on examination. She also has Nasal sores that are painful. Culture from 11/2018 showed MRSA.  Denies Fever. \par She also complaints of swelling in her right leg. Not tender and not erythematous and negative Gong;s sign. \par Her diarrhea with Revlimid is ongoing and Imodium and Lomotil helps but not everyday. \par She is on 60,000 units of procrit weekly and Revlimid 5 mg 2 weeks on 1 week off. \par \par 4/23/19\par Pt is here for follow up.\par She feels well.\par The nasal sores have improved with bactroban\par The abscess on left shoulder has resolved.\par However she has a new one on the middle of her back.\par No fever.\par Pt has been on procrit 87409 units weekly, however she missed a dose in between.\par \par 5/8/19\par pt is here for follow up.\par no new complaints.\par feels well.\par Her skin abscess has resolved.\par she has been unable to go to ID, as she could not get an appt.\par No bleeding.\par She is compliant with revlimid.\par \par 6/6/19\par Pt is here for follow up.\par no new complaints \par Feels well.\par Is on week 2 of her Revlimid cycle. \par No transfusions since last visit\par \par 7/17/19\par Pt is here for follow up.\par C/O fatigue.\par Was away for a few days two weeks ago, but missed treatment with procrit for 2 weeks.\par Is complaint with Revlimid 2 weeks on 1 week off.\par Diarrhea is a little improved.\par \par \par 8/14/19\par Patient here for follow up visit, feeling well.  Although she is complaining of some pain at the left scapula area recently.  Patient denies cough, shortness of breath, denies fever, denies other bone pain.  She is off Revlimid  this week, due to restart on Monday.  She is scheduled for Procrit and Vitamin B12 injection today.  She plans to leave the country tomorrow to visit her mother who is ill.  She will return in about 10 days.\par \par 9/11/19\par Pt is here for follow up.\par She was away for 3 weeks, out of which she took procrit for 2 weeks in [place].\par She missed last week's dose.\par She had CBCs in [place] which showed Hgb of 8.9\par She feels well.\par She still getting diarrhea.\par She has been using lomotil with very minimal relief.\par She started a new cycle of Revlimid 4 days ago.\par \par 10/3/19\par Patient here for follow up visit, feeling fairly well.   Patient denies cough, shortness of breath, denies fever, denies other bone pain.  She remains on Revlimid.  She is scheduled for Procrit and Vitamin B12 injection today.\par \par 10/24/19\par Pt is here for follow up.\par Feels well.\par No SOB, fatigue.\par Compliant with procrit and Revl;imid.\par Remains on ASa.\par Diarrhea is unchanged.\par Pt takes imodium as needed.\par \par 11/14/19\par Pt is here for follow up.\par No new complaints.\par Starts a new cycle of Revlimid today.\par Diarrhea is same as before, no rectal bleeding.\par No fever.\par \par 12/5/19\par Pt is here for follow up.\par No new complaints.\par Denies feeling weaker than usual.\par No fever, SOB.\par No change in frequency of diarrhea.\par No pain.\par Will start next cycle of Revlimid in 3 days.\par \par \par !/16/20\par Pt was recently DCed from [facility] 3 days ago after being treated for pneumonia and MARCO.\par She is on po Levaquin.\par She restarted Revlmid 3 days ago.\par She is feeling better now. No fever.\par No SOB, Chest pain and back pain has resolved.\par Pt has a cough, no expectoration.\par She also recd a unit of PRBCs last week in the hospital\par \par \par 1/30/20\par Patient here for follow up visit, feeling well.  She has no new complaints. Cough is almost gone completely.  Patient denies shortness of breath, denies fever, denies bone pain.  Her appetite is ok, weight is stable.  She is due to restart Revlimid on Monday.\par \par 02/20/20\par Pt is here for follow up, feeling well.\par Offers no new complaints. Denies SOB, fever, chills, weight loss, CP, cough. \par Currently off week of Revlimid 5 mg. Restarts on Monday.\par Has procrit 80,000 units today. Next b 12 injection due in 2 weeks \par Did not get chest Xray\par CBC reviewed WBC 3.1, h/h 8.3/25%, plt 386, neutrophils 1.29\par \par 3/12/20\par Pt is here for follow up.\par She took Revlimid for 2 weeks and then has been off for one week although she was advised to take it daily without break.\par No SOB, Cough, fever.\par Has mild fatigue.\par \par 04/02/2020\par SIMONA KUHN a 75 year F is here today for follow up visit of MDS.\par Denies fever, chills, cough,night sweats, weight loss. \par Denies bleeding or bruising.\par Pt receives procrit 80,000 units weekly and B12 IM monthly. \par Continued Revlimid 5 mg daily x 3 weeks, has 1 dose left tonight. \par CBC reviewed: WBC 3.2, H/H 8.1/25.2, neutrophils 1.6, PLT 72\par \par 4/20/2020: The patient is here for follow up . She has no complaints of fever, chills, dizziness , chest pain and shortness of breath . She is still with diarrhea , up to 10 watery BMs per day, responds poorly to imodium and lomotil. She is on Revlimid 5 mg daily , took last dose yesterday night. Her last Procrit was on April 13. \par \par 5/26/20\par Pt is here for follow up.\par She is feeling well. Denies SOB, chest pain, fever.\par Continues to have diarrhea although she is off of Revlmid.\par Thinks it may due to diabetic meds.\par Wants to discuss BM bx results\par \par 6/22/20\par Pt is here for follow up.\par Feels well. Denies any fever, N, V, D\par Continues to have diarrhea, she will talk to her PCP about changing metformin for DM.\par Has been needing PRBCs 1 unit each month\par \par 7/20/20\par Pt is here for follow up.\par No new complaints. Has been feeling well.\par No SOB, CP. \par No N, V, D.\par She has recd 2 units of PRBCs since May 20.\par \par 8/17/20\par Pt is here for a follow up visit.\par She is feeling well.\par No new symptoms. No N, V, D.\par No bruising or bleeding. She continues to need PRBCs every 2-3 weeks.\par \par 8/31/20\par Pt is here for follow up. She is feeling well. No N, V, D.\par She is tolerating the hydrea well. No SOB, CP\par No bruising ior bleeding\par \par 9/8/20\par Pt is here for follow up.\par She had been contacted by the [facility] last week to advise her to stop the hydrea. Pt did not check her messages and continued with Hydrea.\par No fever, N, V, bleeding.\par Saw Dr Ferrell last week.\par \par 9/14/20\par Pt is here for folow up.\par Besides her usual complaint of diarrhea she is feeling well.\par Recd 1 unit PRBC last week.\par Has stopped Hydrea.\par No fever, mouth sores.\par \par 9/29/20\par Pt is here for folow up.\par No new complaints.\par Is seeing GI for diarrhea net week.\par No fever, night sweats.\par REcd 3 units of PRBC this month\par \par 10/13/20\par Pt is here for follow up.\par No new complaints.\par Has not needed a transfusion since 3 weeks ago.\par Continues to have diarrhea, waiting to be seen by GI\par \par 10/26/20\par Pt is here for follow up.\par C/O feeling fatigued.\par Has CLARK.\par No nausea or vomiting.\par No fever.\par Diarrhea has improved a little. She is no longer on Metformin\par \par 11/9/20\par Pt is here for follow up.\par Feels well. Denies SOB, CP, fatigue\par \par \par 12/7/20- Patient is for follow up and is due for cycle 9 of Vidaza.  She still has loose BM sometimes 10 times a day and was seeing Dr. Corey from GI- did not follow up recently and is unable to get an appt with him. She has lost about 10 lbs since September. No N/V. No fever or chills. No CP/SOB. She has been getting PRBC transfusion every 3 weeks now\par \par 01/04/20 Pt presented today for f/u visit . She is s/p 8 cycles of vidaza and was started on Luspatercept in Dec , 2020 . So far she received 1 injection , she is due for 2nd injection today . Adverse effect profile was discussed with her in detail , she reports having some dizziness and weakness after first injection . She received blood transfusion last wk . Blood work from today reviewed as well and counts are adequate . She denies any fevers,  chills,  or any new symptoms . \par \par 1/25/21\par Pt is here for follow up.\par C/O diarrhea, otherwise feeling better.\par Has not needed a transfusion since 12/29\par \par 2/16/21\par Pt is here for follow up.\par Needed transfusion on 2/8/21.\par Continues to C/o fatigue. Diarrhea remains the same, has not made GI appt as yet.\par \par 3/8/21\par Pt is here for follow up.\par Feels better today. Recd 1 unit PRBCs last week.\par Diarrhea is same as before. has an appt with GI next week.\par No fever\par \par 3/30/21\par Pt is here for follow up.\par Feels better. Last transfusion was on 3/2/21\par Saw GI and was started on cholestyramine and metformin was DCED with resolution of diarrhea.\par She denies any SOB,\par Fatigue is better\par \par 4/20/2021\par Pt is here to f/u for MDS\par She is s/p Luspatercept cycle #6\par She is compliant with Aspirin\par She feels better today, appetite is good\par She denies shortness of breath, fatigue, or weakness \par She c/o of diarrhea but it has improved since she was started on Cholestyramine. Stool is soft and less in frequency\par She received COVID vaccine x 2 doses\par \par 5/11/21\par pt is here for follow up.\par Feels the same with fatigue, diarrhea.\par No SOB\par  [de-identified] : \par

## 2021-05-22 NOTE — ASSESSMENT
[FreeTextEntry1] : # Lowrisk myelodysplastic syndrome, previously treated with  Revlimid and Procrit, now on Vidaza . 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 \par Patient is s/p  9 cycles of Vidaza and she was requiring pRBC transfusion every 3 weeks \par Given the persistent transfusion dependence she was switched to Luspatercept\par \par Hyperkalemia likely due to increased platelet; pseudohyperkalemia.\par \par Plan:\par Previous notes and lab results reviewed by Dr. Wade prior to the visit and explained to the patient.\par \par \par Pt has been receiving pRBC transfusion almost once a month.\par Will increase Luspatercept @ 1.75 mg/kg  today cycle # 8\par \par Adverse effect profile was discussed with her in detail and she was told that they include but are not limited to Hypertension, abdominal pain, transaminitis, fatigue, weakness, arthralgias and muscle aches .\par \par Continue with Procrit 80,000 units for now\par Monitor CBC weekly with transfusions as needed if Hb less than 7 g/dL\par Transfuse 1 unit PRBC today\par \par # Vitamin  B12 deficiency\par - Continue Vitamin B12 injection every month\par \par # Thrombocytosis\par - Continue ASA. \par - Hold Hydrea for now\par - Will continue to monitor CBC\par \par RTC in 3 weeks\par \par \par \par \par \par \par

## 2021-05-22 NOTE — REVIEW OF SYSTEMS
[Diarrhea] : diarrhea [Negative] : Allergic/Immunologic [Shortness Of Breath] : no shortness of breath [Recent Change In Weight] : ~T no recent weight change

## 2021-05-22 NOTE — PHYSICAL EXAM
[Restricted in physically strenuous activity but ambulatory and able to carry out work of a light or sedentary nature] : Status 1- Restricted in physically strenuous activity but ambulatory and able to carry out work of a light or sedentary nature, e.g., light house work, office work [Normal] : affect appropriate [de-identified] : conjunctival pallor

## 2021-05-24 ENCOUNTER — LABORATORY RESULT (OUTPATIENT)
Age: 76
End: 2021-05-24

## 2021-05-24 ENCOUNTER — APPOINTMENT (OUTPATIENT)
Dept: INFUSION THERAPY | Facility: CLINIC | Age: 76
End: 2021-05-24

## 2021-05-24 LAB
HCT VFR BLD CALC: 22.6 %
HGB BLD-MCNC: 7.4 G/DL
MCHC RBC-ENTMCNC: 32.3 PG
MCHC RBC-ENTMCNC: 32.7 G/DL
MCV RBC AUTO: 98.7 FL
PLATELET # BLD AUTO: 646 K/UL
PMV BLD: 9.6 FL
RBC # BLD: 2.29 M/UL
RBC # FLD: 23 %
WBC # FLD AUTO: 8.12 K/UL

## 2021-05-24 RX ORDER — ERYTHROPOIETIN 10000 [IU]/ML
80000 INJECTION, SOLUTION INTRAVENOUS; SUBCUTANEOUS ONCE
Refills: 0 | Status: COMPLETED | OUTPATIENT
Start: 2021-05-24 | End: 2021-05-24

## 2021-05-24 RX ADMIN — ERYTHROPOIETIN 80000 UNIT(S): 10000 INJECTION, SOLUTION INTRAVENOUS; SUBCUTANEOUS at 13:58

## 2021-06-01 ENCOUNTER — APPOINTMENT (OUTPATIENT)
Dept: INFUSION THERAPY | Facility: CLINIC | Age: 76
End: 2021-06-01

## 2021-06-01 ENCOUNTER — LABORATORY RESULT (OUTPATIENT)
Age: 76
End: 2021-06-01

## 2021-06-01 ENCOUNTER — EMERGENCY (EMERGENCY)
Facility: HOSPITAL | Age: 76
LOS: 0 days | Discharge: HOME | End: 2021-06-01
Attending: EMERGENCY MEDICINE | Admitting: EMERGENCY MEDICINE
Payer: MEDICARE

## 2021-06-01 VITALS
TEMPERATURE: 99 F | SYSTOLIC BLOOD PRESSURE: 128 MMHG | HEART RATE: 87 BPM | DIASTOLIC BLOOD PRESSURE: 52 MMHG | HEIGHT: 62 IN | OXYGEN SATURATION: 98 % | RESPIRATION RATE: 20 BRPM

## 2021-06-01 VITALS
OXYGEN SATURATION: 100 % | DIASTOLIC BLOOD PRESSURE: 63 MMHG | RESPIRATION RATE: 18 BRPM | HEART RATE: 100 BPM | TEMPERATURE: 98 F | SYSTOLIC BLOOD PRESSURE: 133 MMHG

## 2021-06-01 DIAGNOSIS — Z79.02 LONG TERM (CURRENT) USE OF ANTITHROMBOTICS/ANTIPLATELETS: ICD-10-CM

## 2021-06-01 DIAGNOSIS — I10 ESSENTIAL (PRIMARY) HYPERTENSION: ICD-10-CM

## 2021-06-01 DIAGNOSIS — E11.9 TYPE 2 DIABETES MELLITUS WITHOUT COMPLICATIONS: ICD-10-CM

## 2021-06-01 DIAGNOSIS — R53.1 WEAKNESS: ICD-10-CM

## 2021-06-01 DIAGNOSIS — Z79.84 LONG TERM (CURRENT) USE OF ORAL HYPOGLYCEMIC DRUGS: ICD-10-CM

## 2021-06-01 DIAGNOSIS — D64.9 ANEMIA, UNSPECIFIED: ICD-10-CM

## 2021-06-01 DIAGNOSIS — Z86.2 PERSONAL HISTORY OF DISEASES OF THE BLOOD AND BLOOD-FORMING ORGANS AND CERTAIN DISORDERS INVOLVING THE IMMUNE MECHANISM: ICD-10-CM

## 2021-06-01 DIAGNOSIS — Z79.899 OTHER LONG TERM (CURRENT) DRUG THERAPY: ICD-10-CM

## 2021-06-01 LAB
ALBUMIN SERPL ELPH-MCNC: 4.6 G/DL — SIGNIFICANT CHANGE UP (ref 3.5–5.2)
ALP SERPL-CCNC: 120 U/L — HIGH (ref 30–115)
ALT FLD-CCNC: 12 U/L — SIGNIFICANT CHANGE UP (ref 0–41)
ANION GAP SERPL CALC-SCNC: 17 MMOL/L — HIGH (ref 7–14)
AST SERPL-CCNC: 9 U/L — SIGNIFICANT CHANGE UP (ref 0–41)
BASOPHILS # BLD AUTO: 0.05 K/UL — SIGNIFICANT CHANGE UP (ref 0–0.2)
BASOPHILS NFR BLD AUTO: 0.8 % — SIGNIFICANT CHANGE UP (ref 0–1)
BILIRUB SERPL-MCNC: 0.5 MG/DL — SIGNIFICANT CHANGE UP (ref 0.2–1.2)
BLD GP AB SCN SERPL QL: SIGNIFICANT CHANGE UP
BUN SERPL-MCNC: 40 MG/DL — HIGH (ref 10–20)
CALCIUM SERPL-MCNC: 9 MG/DL — SIGNIFICANT CHANGE UP (ref 8.5–10.1)
CHLORIDE SERPL-SCNC: 100 MMOL/L — SIGNIFICANT CHANGE UP (ref 98–110)
CO2 SERPL-SCNC: 17 MMOL/L — SIGNIFICANT CHANGE UP (ref 17–32)
CREAT SERPL-MCNC: 1.7 MG/DL — HIGH (ref 0.7–1.5)
EOSINOPHIL # BLD AUTO: 0.04 K/UL — SIGNIFICANT CHANGE UP (ref 0–0.7)
EOSINOPHIL NFR BLD AUTO: 0.6 % — SIGNIFICANT CHANGE UP (ref 0–8)
GLUCOSE SERPL-MCNC: 382 MG/DL — HIGH (ref 70–99)
HCT VFR BLD CALC: 19.8 % — LOW (ref 37–47)
HGB BLD-MCNC: 6.5 G/DL — CRITICAL LOW (ref 12–16)
IMM GRANULOCYTES NFR BLD AUTO: 1 % — HIGH (ref 0.1–0.3)
LYMPHOCYTES # BLD AUTO: 0.94 K/UL — LOW (ref 1.2–3.4)
LYMPHOCYTES # BLD AUTO: 15.1 % — LOW (ref 20.5–51.1)
MCHC RBC-ENTMCNC: 32.8 G/DL — SIGNIFICANT CHANGE UP (ref 32–37)
MCHC RBC-ENTMCNC: 32.8 PG — HIGH (ref 27–31)
MCV RBC AUTO: 100 FL — HIGH (ref 81–99)
MONOCYTES # BLD AUTO: 0.53 K/UL — SIGNIFICANT CHANGE UP (ref 0.1–0.6)
MONOCYTES NFR BLD AUTO: 8.5 % — SIGNIFICANT CHANGE UP (ref 1.7–9.3)
NEUTROPHILS # BLD AUTO: 4.61 K/UL — SIGNIFICANT CHANGE UP (ref 1.4–6.5)
NEUTROPHILS NFR BLD AUTO: 74 % — SIGNIFICANT CHANGE UP (ref 42.2–75.2)
NRBC # BLD: 0 /100 WBCS — SIGNIFICANT CHANGE UP (ref 0–0)
PLATELET # BLD AUTO: 594 K/UL — HIGH (ref 130–400)
POTASSIUM SERPL-MCNC: 5.8 MMOL/L — HIGH (ref 3.5–5)
POTASSIUM SERPL-SCNC: 5.8 MMOL/L — HIGH (ref 3.5–5)
PROT SERPL-MCNC: 6.2 G/DL — SIGNIFICANT CHANGE UP (ref 6–8)
RBC # BLD: 1.98 M/UL — LOW (ref 4.2–5.4)
RBC # FLD: 23.9 % — HIGH (ref 11.5–14.5)
SODIUM SERPL-SCNC: 134 MMOL/L — LOW (ref 135–146)
WBC # BLD: 6.23 K/UL — SIGNIFICANT CHANGE UP (ref 4.8–10.8)
WBC # FLD AUTO: 6.23 K/UL — SIGNIFICANT CHANGE UP (ref 4.8–10.8)

## 2021-06-01 PROCEDURE — 71045 X-RAY EXAM CHEST 1 VIEW: CPT | Mod: 26

## 2021-06-01 PROCEDURE — 93010 ELECTROCARDIOGRAM REPORT: CPT

## 2021-06-01 PROCEDURE — 99284 EMERGENCY DEPT VISIT MOD MDM: CPT

## 2021-06-01 RX ORDER — ERYTHROPOIETIN 10000 [IU]/ML
80000 INJECTION, SOLUTION INTRAVENOUS; SUBCUTANEOUS ONCE
Refills: 0 | Status: COMPLETED | OUTPATIENT
Start: 2021-06-01 | End: 2021-06-01

## 2021-06-01 RX ADMIN — ERYTHROPOIETIN 80000 UNIT(S): 10000 INJECTION, SOLUTION INTRAVENOUS; SUBCUTANEOUS at 14:45

## 2021-06-01 NOTE — ED PROVIDER NOTE - CARE PROVIDERS DIRECT ADDRESSES
[email or web address]

## 2021-06-01 NOTE — ED PROVIDER NOTE - CARE PROVIDER_API CALL
Domenic Moe)  Internal Medicine; Medical Oncology  [address] [place] [address]  Phone: [phone]  Fax: [phone]  Follow Up Time:     Kaveh Wade)  Hematology; Internal Medicine; Medical Oncology  [address] [place] [address]  Phone: [phone]  Fax: [phone]  Follow Up Time:

## 2021-06-01 NOTE — ED PROVIDER NOTE - OBJECTIVE STATEMENT
76 year old female with pmhx of MDS, (Dr Wade) presents with low hb. pt had labs dranw, hb of 6.8. pt admits to fatigue and weakness. no bleeding, dark stools, abd pain, nausea, vomiting, diarrhea, chest pain, sob, ha or dizziness.

## 2021-06-01 NOTE — ED PROVIDER NOTE - CLINICAL SUMMARY MEDICAL DECISION MAKING FREE TEXT BOX
hgb 6.5, pt transfused 1 unit prbc's, pt to f/u with heme/onc, told to return to ER if she feels worse or for any other new/concerning symptoms.

## 2021-06-01 NOTE — ED ADULT NURSE REASSESSMENT NOTE - NS ED NURSE REASSESS COMMENT FT1
patient received from previous RN. Patient receiving 1 unit of RPBC, tolerating well. Patient offers no complaints at this time. Will continue to monitor.

## 2021-06-01 NOTE — ED PROVIDER NOTE - PHYSICAL EXAMINATION
CONST: Well appearing in NAD  EYES: PERRL, EOMI, Sclera and conjunctiva clear.  ENT: No nasal discharge. Oropharynx normal appearing, no erythema or exudates. No abscess or swelling. Uvula midline.  NECK: Non-tender, no meningeal signs. normal ROM. supple   CARD: Normal S1 S2; Normal rate and rhythm  RESP: Equal BS B/L, No wheezes, rhonchi or rales. No distress  GI: Soft, non-tender, non-distended. no cva tenderness. normal BS  MS: Normal ROM in all extremities. No midline spinal tenderness. pulses 2 +. no calf tenderness or swelling  SKIN: Warm, dry, no acute rashes. Good turgor  NEURO: A&Ox3, No focal deficits. Strength 5/5 with no sensory deficits. Steady gait.

## 2021-06-01 NOTE — ED PROVIDER NOTE - PATIENT PORTAL LINK FT
You can access the FollowMyHealth Patient Portal offered by [facility] by registering at the following website: [email or web address]. By joining [employer]’s FollowMyHealth portal, you will also be able to view your health information using other applications (apps) compatible with our system.

## 2021-06-01 NOTE — ED PROVIDER NOTE - ATTENDING CONTRIBUTION TO CARE
75 y/o female with h/o MDS, dm, htn, sent to ER from [facility] for blood transfusion.  Follows with heme/onc, Dr. Wade, had labs drawn yesterday and called for hgb 6.8.  Pt c/o feeling tired and fatigued, which she gets when her blood counts are low.  No cp/sob.  no abd pain.  no nv/d.  no f/c.  no ha/dizziness/loc.  No black or bloody stools.  PE - as above.  -check labs, transfuse.

## 2021-06-01 NOTE — ED ADULT NURSE REASSESSMENT NOTE - NS ED NURSE REASSESS COMMENT FT1
patient completed 1 unit of PRBC.  Patient tolerated well with no transfusion reaction noted.  Patient in stable condition and nad.  Patient OSat =100% on room air.  Patient ambulates with a steady gait.  Patient waiting for dispo.  Will continue to monitor.

## 2021-06-02 LAB
HCT VFR BLD CALC: 20.9 %
HGB BLD-MCNC: 6.8 G/DL
MCHC RBC-ENTMCNC: 32.5 G/DL
MCHC RBC-ENTMCNC: 33 PG
MCV RBC AUTO: 101.5 FL
PLATELET # BLD AUTO: 682 K/UL
PMV BLD: 9.8 FL
RBC # BLD: 2.06 M/UL
RBC # FLD: 23.7 %
WBC # FLD AUTO: 8.13 K/UL

## 2021-06-07 ENCOUNTER — LABORATORY RESULT (OUTPATIENT)
Age: 76
End: 2021-06-07

## 2021-06-07 ENCOUNTER — APPOINTMENT (OUTPATIENT)
Dept: INFUSION THERAPY | Facility: CLINIC | Age: 76
End: 2021-06-07

## 2021-06-07 LAB
HCT VFR BLD CALC: 24.3 %
HGB BLD-MCNC: 8.1 G/DL
MCHC RBC-ENTMCNC: 32.5 PG
MCHC RBC-ENTMCNC: 33.3 G/DL
MCV RBC AUTO: 97.6 FL
PLATELET # BLD AUTO: 458 K/UL
PMV BLD: 11 FL
RBC # BLD: 2.49 M/UL
RBC # FLD: 21.3 %
WBC # FLD AUTO: 7.91 K/UL

## 2021-06-07 RX ORDER — LUSPATERCEPT 75 MG/1
110 INJECTION, POWDER, LYOPHILIZED, FOR SOLUTION SUBCUTANEOUS ONCE
Refills: 0 | Status: COMPLETED | OUTPATIENT
Start: 2021-06-07 | End: 2021-06-07

## 2021-06-07 RX ORDER — PREGABALIN 225 MG/1
1000 CAPSULE ORAL ONCE
Refills: 0 | Status: COMPLETED | OUTPATIENT
Start: 2021-06-07 | End: 2021-06-07

## 2021-06-07 RX ORDER — ERYTHROPOIETIN 10000 [IU]/ML
80000 INJECTION, SOLUTION INTRAVENOUS; SUBCUTANEOUS ONCE
Refills: 0 | Status: COMPLETED | OUTPATIENT
Start: 2021-06-07 | End: 2021-06-07

## 2021-06-07 RX ADMIN — PREGABALIN 1000 MICROGRAM(S): 225 CAPSULE ORAL at 15:55

## 2021-06-07 RX ADMIN — LUSPATERCEPT 110 MILLIGRAM(S): 75 INJECTION, POWDER, LYOPHILIZED, FOR SOLUTION SUBCUTANEOUS at 15:57

## 2021-06-07 RX ADMIN — ERYTHROPOIETIN 80000 UNIT(S): 10000 INJECTION, SOLUTION INTRAVENOUS; SUBCUTANEOUS at 15:56

## 2021-06-14 ENCOUNTER — APPOINTMENT (OUTPATIENT)
Dept: INFUSION THERAPY | Facility: CLINIC | Age: 76
End: 2021-06-14

## 2021-06-14 ENCOUNTER — OUTPATIENT (OUTPATIENT)
Dept: OUTPATIENT SERVICES | Facility: HOSPITAL | Age: 76
LOS: 1 days | Discharge: HOME | End: 2021-06-14

## 2021-06-14 ENCOUNTER — LABORATORY RESULT (OUTPATIENT)
Age: 76
End: 2021-06-14

## 2021-06-14 DIAGNOSIS — Z79.899 OTHER LONG TERM (CURRENT) DRUG THERAPY: ICD-10-CM

## 2021-06-14 DIAGNOSIS — D46.9 MYELODYSPLASTIC SYNDROME, UNSPECIFIED: ICD-10-CM

## 2021-06-14 DIAGNOSIS — Z51.11 ENCOUNTER FOR ANTINEOPLASTIC CHEMOTHERAPY: ICD-10-CM

## 2021-06-14 DIAGNOSIS — D64.9 ANEMIA, UNSPECIFIED: ICD-10-CM

## 2021-06-14 LAB
HCT VFR BLD CALC: 24 %
HGB BLD-MCNC: 7.9 G/DL
MCHC RBC-ENTMCNC: 32.5 PG
MCHC RBC-ENTMCNC: 32.9 G/DL
MCV RBC AUTO: 98.8 FL
PLATELET # BLD AUTO: 502 K/UL
PMV BLD: 11.1 FL
RBC # BLD: 2.43 M/UL
RBC # FLD: 21.4 %
WBC # FLD AUTO: 11.7 K/UL

## 2021-06-14 RX ORDER — ERYTHROPOIETIN 10000 [IU]/ML
80000 INJECTION, SOLUTION INTRAVENOUS; SUBCUTANEOUS ONCE
Refills: 0 | Status: DISCONTINUED | OUTPATIENT
Start: 2021-06-14 | End: 2021-06-14

## 2021-06-14 RX ORDER — ERYTHROPOIETIN 10000 [IU]/ML
80000 INJECTION, SOLUTION INTRAVENOUS; SUBCUTANEOUS ONCE
Refills: 0 | Status: COMPLETED | OUTPATIENT
Start: 2021-06-14 | End: 2021-06-14

## 2021-06-14 RX ADMIN — ERYTHROPOIETIN 80000 UNIT(S): 10000 INJECTION, SOLUTION INTRAVENOUS; SUBCUTANEOUS at 14:39

## 2021-06-21 ENCOUNTER — APPOINTMENT (OUTPATIENT)
Dept: INFUSION THERAPY | Facility: CLINIC | Age: 76
End: 2021-06-21
Payer: MEDICARE

## 2021-06-21 ENCOUNTER — LABORATORY RESULT (OUTPATIENT)
Age: 76
End: 2021-06-21

## 2021-06-21 ENCOUNTER — APPOINTMENT (OUTPATIENT)
Dept: HEMATOLOGY ONCOLOGY | Facility: CLINIC | Age: 76
End: 2021-06-21
Payer: MEDICARE

## 2021-06-21 VITALS
HEART RATE: 107 BPM | BODY MASS INDEX: 26.5 KG/M2 | SYSTOLIC BLOOD PRESSURE: 112 MMHG | HEIGHT: 62 IN | WEIGHT: 144 LBS | DIASTOLIC BLOOD PRESSURE: 53 MMHG | RESPIRATION RATE: 14 BRPM | TEMPERATURE: 97.6 F

## 2021-06-21 LAB
HCT VFR BLD CALC: 21 %
HGB BLD-MCNC: 6.8 G/DL
MCHC RBC-ENTMCNC: 32.4 G/DL
MCHC RBC-ENTMCNC: 32.9 PG
MCV RBC AUTO: 101.4 FL
PLATELET # BLD AUTO: 544 K/UL
PMV BLD: 10.4 FL
RBC # BLD: 2.07 M/UL
RBC # FLD: 21.6 %
WBC # FLD AUTO: 8.7 K/UL

## 2021-06-21 PROCEDURE — 99215 OFFICE O/P EST HI 40 MIN: CPT

## 2021-06-21 RX ORDER — ERYTHROPOIETIN 10000 [IU]/ML
80000 INJECTION, SOLUTION INTRAVENOUS; SUBCUTANEOUS ONCE
Refills: 0 | Status: COMPLETED | OUTPATIENT
Start: 2021-06-21 | End: 2021-06-21

## 2021-06-21 RX ORDER — ERYTHROPOIETIN 10000 [IU]/ML
80000 INJECTION, SOLUTION INTRAVENOUS; SUBCUTANEOUS ONCE
Refills: 0 | Status: DISCONTINUED | OUTPATIENT
Start: 2021-06-21 | End: 2021-06-21

## 2021-06-21 RX ADMIN — ERYTHROPOIETIN 80000 UNIT(S): 10000 INJECTION, SOLUTION INTRAVENOUS; SUBCUTANEOUS at 11:59

## 2021-06-21 NOTE — ASSESSMENT
[FreeTextEntry1] : # Lowrisk myelodysplastic syndrome, previously treated with  Revlimid and Procrit, now on Vidaza . 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 \par Patient is s/p  9 cycles of Vidaza and she was requiring pRBC transfusion every 3 weeks \par Given the persistent transfusion dependence she was switched to Luspatercept\par \par Hyperkalemia likely due to increased platelet; pseudohyperkalemia.\par \par Plan:\par  I have prepared the note after I reviewed the previous notes, reviewed the radiological and laboratory studies and have communicated those to the patient\par \par Pt has been receiving pRBC transfusion almost once a month.\par \par Continue Luspatercept @ 1.75 mg/kg. It is due next week\par Adverse effect profile was discussed with her in detail and she was told that they include but are not limited to Hypertension, abdominal pain, transaminitis, fatigue, weakness, arthralgias and muscle aches .\par \par Continue with Procrit 80,000 units for now\par Monitor CBC weekly with transfusions as needed if Hb less than 7 g/dL\par Pt was advised PrbC 1 unit today but wuld like to hold off as she is not symptomatic. Advised to come in should her symptoms change.\par \par # Vitamin  B12 deficiency\par - Continue Vitamin B12 injection every month\par \par # Thrombocytosis\par - Continue ASA. \par - Hold Hydrea for now\par - Will continue to monitor CBC\par \par RTC in 4 weeks\par \par \par \par \par \par \par

## 2021-06-21 NOTE — HISTORY OF PRESENT ILLNESS
[de-identified] : She missed on appointment for procrit last week.\par She starts her next cycle on 6/25/18\par C/o back pain radiating down her left which occurred when she bent to  something.\par No change in diarrhea.\par \par 7/31/18:\par Doing well. On Revlimid for more than 2yrs, 5mg 2weeks on 1 week off. She will be starting week on tonight. \par C/o diarrhea. On Imodium 2 pills AM and 2 pills PM. Doesn't help much with diarrhea. \par C/o nausea, abd pain. \par \par \par Colonoscopy in 2016 was normal. \par Did not have blood transfusion for over 2 years. \par On procrit [id number] weekly. Missed last week on 7/24/18 as she was out of town. She has been non compliant with weekly Procrit.\par Mammogram in 2017 was normal. \par \par 9/11/18\par pt is here for follow up.\par She feels the lomotil helps with the diarrhea.\par She was away for a few weeks and missed her procrit injections.\par No fever, abdominal  discomfort has been less since since use of lomotil.\par She started a new cycle 2 days ago.\par \par 10/2/18:\par She will start Revlimid tonight (week on). 2 weeks on, 1 week off. \par On ASA 81mg. \par Denies fever, nausea, vomiting, chest pain, SOB, abdominal pain, and bladder problems.\par C/o diarrhea. \par S/p 2 units PRBC transfusion on 9/25/18. \par On Procrit 26779 units weekly. Not compliant every week. \par C/o intermittent dizziness. \par \par 10/31/18\par Pt is here for follow up.\par C/O significant fatigue.\par Continues to have diarrhea, takes imodium and lomotil as needed.\par C/o dry cough, no fever.\par Cough started a month ago. It is worse in the mornings however over last week it has been constant all day.\par No chest pain.\par She was found to have low B12 levels and was started on B12 injections.\par \par 11/27/18:\par Doing well. Hospitalized for 3 days for cellulitis/abcess of the back s/p drainage and on Abx currently. Developed 3 days after Mg infusion as per pt. \par Denies nausea, vomiting, chest pain, SOB, abdominal pain, bowel and bladder problems.\par This is the week on Revlimid (week 1).\par S/p 1 unit PRBC transfusion in the hospital. \par On Procrit weekly \par \par 12/27/18:\par Doing well. No major complaints.\par Denies fever, nausea, vomiting, chest pain, SOB, abdominal pain, and bladder problems.\par On Revlimid 5 mg 2 weeks on 1 week off. This is the week off. She will start the week on sunday (12/30/18).\par Due for Procrit 43837 units today. \par Still has diarrhea. 4-5bm/day.\par On monthly B12 injections. \par \par 1/30/19:\par Patient here for follow up for MDS.  She is taking Revlimid 5 mg, 2 weeks on, 1 week off.  She will complete this current cycle on Saturday.  She has no new complaints today.  Patient denies cough, shortness of breath, denies fever, denies bone pain.\par \par 03/04/2019\par Patient is here for a follow up visit. She complaints of severe pain in her left shoulder and has had abscess drained 2 weeks ago. However the pain is worse and she has purulent discharge on examination. She also has Nasal sores that are painful. Culture from 11/2018 showed MRSA.  Denies Fever. \par She also complaints of swelling in her right leg. Not tender and not erythematous and negative Gong;s sign. \par Her diarrhea with Revlimid is ongoing and Imodium and Lomotil helps but not everyday. \par She is on 60,000 units of procrit weekly and Revlimid 5 mg 2 weeks on 1 week off. \par \par 4/23/19\par Pt is here for follow up.\par She feels well.\par The nasal sores have improved with bactroban\par The abscess on left shoulder has resolved.\par However she has a new one on the middle of her back.\par No fever.\par Pt has been on procrit 26931 units weekly, however she missed a dose in between.\par \par 5/8/19\par pt is here for follow up.\par no new complaints.\par feels well.\par Her skin abscess has resolved.\par she has been unable to go to ID, as she could not get an appt.\par No bleeding.\par She is compliant with revlimid.\par \par 6/6/19\par Pt is here for follow up.\par no new complaints \par Feels well.\par Is on week 2 of her Revlimid cycle. \par No transfusions since last visit\par \par 7/17/19\par Pt is here for follow up.\par C/O fatigue.\par Was away for a few days two weeks ago, but missed treatment with procrit for 2 weeks.\par Is complaint with Revlimid 2 weeks on 1 week off.\par Diarrhea is a little improved.\par \par \par 8/14/19\par Patient here for follow up visit, feeling well.  Although she is complaining of some pain at the left scapula area recently.  Patient denies cough, shortness of breath, denies fever, denies other bone pain.  She is off Revlimid  this week, due to restart on Monday.  She is scheduled for Procrit and Vitamin B12 injection today.  She plans to leave the country tomorrow to visit her mother who is ill.  She will return in about 10 days.\par \par 9/11/19\par Pt is here for follow up.\par She was away for 3 weeks, out of which she took procrit for 2 weeks in [place].\par She missed last week's dose.\par She had CBCs in [place] which showed Hgb of 8.9\par She feels well.\par She still getting diarrhea.\par She has been using lomotil with very minimal relief.\par She started a new cycle of Revlimid 4 days ago.\par \par 10/3/19\par Patient here for follow up visit, feeling fairly well.   Patient denies cough, shortness of breath, denies fever, denies other bone pain.  She remains on Revlimid.  She is scheduled for Procrit and Vitamin B12 injection today.\par \par 10/24/19\par Pt is here for follow up.\par Feels well.\par No SOB, fatigue.\par Compliant with procrit and Revl;imid.\par Remains on ASa.\par Diarrhea is unchanged.\par Pt takes imodium as needed.\par \par 11/14/19\par Pt is here for follow up.\par No new complaints.\par Starts a new cycle of Revlimid today.\par Diarrhea is same as before, no rectal bleeding.\par No fever.\par \par 12/5/19\par Pt is here for follow up.\par No new complaints.\par Denies feeling weaker than usual.\par No fever, SOB.\par No change in frequency of diarrhea.\par No pain.\par Will start next cycle of Revlimid in 3 days.\par \par \par !/16/20\par Pt was recently DCed from [facility] 3 days ago after being treated for pneumonia and MARCO.\par She is on po Levaquin.\par She restarted Revlmid 3 days ago.\par She is feeling better now. No fever.\par No SOB, Chest pain and back pain has resolved.\par Pt has a cough, no expectoration.\par She also recd a unit of PRBCs last week in the hospital\par \par \par 1/30/20\par Patient here for follow up visit, feeling well.  She has no new complaints. Cough is almost gone completely.  Patient denies shortness of breath, denies fever, denies bone pain.  Her appetite is ok, weight is stable.  She is due to restart Revlimid on Monday.\par \par 02/20/20\par Pt is here for follow up, feeling well.\par Offers no new complaints. Denies SOB, fever, chills, weight loss, CP, cough. \par Currently off week of Revlimid 5 mg. Restarts on Monday.\par Has procrit 80,000 units today. Next b 12 injection due in 2 weeks \par Did not get chest Xray\par CBC reviewed WBC 3.1, h/h 8.3/25%, plt 386, neutrophils 1.29\par \par 3/12/20\par Pt is here for follow up.\par She took Revlimid for 2 weeks and then has been off for one week although she was advised to take it daily without break.\par No SOB, Cough, fever.\par Has mild fatigue.\par \par 04/02/2020\par SIMONA KUHN a 75 year F is here today for follow up visit of MDS.\par Denies fever, chills, cough,night sweats, weight loss. \par Denies bleeding or bruising.\par Pt receives procrit 80,000 units weekly and B12 IM monthly. \par Continued Revlimid 5 mg daily x 3 weeks, has 1 dose left tonight. \par CBC reviewed: WBC 3.2, H/H 8.1/25.2, neutrophils 1.6, PLT 72\par \par 4/20/2020: The patient is here for follow up . She has no complaints of fever, chills, dizziness , chest pain and shortness of breath . She is still with diarrhea , up to 10 watery BMs per day, responds poorly to imodium and lomotil. She is on Revlimid 5 mg daily , took last dose yesterday night. Her last Procrit was on April 13. \par \par 5/26/20\par Pt is here for follow up.\par She is feeling well. Denies SOB, chest pain, fever.\par Continues to have diarrhea although she is off of Revlmid.\par Thinks it may due to diabetic meds.\par Wants to discuss BM bx results\par \par 6/22/20\par Pt is here for follow up.\par Feels well. Denies any fever, N, V, D\par Continues to have diarrhea, she will talk to her PCP about changing metformin for DM.\par Has been needing PRBCs 1 unit each month\par \par 7/20/20\par Pt is here for follow up.\par No new complaints. Has been feeling well.\par No SOB, CP. \par No N, V, D.\par She has recd 2 units of PRBCs since May 20.\par \par 8/17/20\par Pt is here for a follow up visit.\par She is feeling well.\par No new symptoms. No N, V, D.\par No bruising or bleeding. She continues to need PRBCs every 2-3 weeks.\par \par 8/31/20\par Pt is here for follow up. She is feeling well. No N, V, D.\par She is tolerating the hydrea well. No SOB, CP\par No bruising ior bleeding\par \par 9/8/20\par Pt is here for follow up.\par She had been contacted by the [facility] last week to advise her to stop the hydrea. Pt did not check her messages and continued with Hydrea.\par No fever, N, V, bleeding.\par Saw Dr Ferrell last week.\par \par 9/14/20\par Pt is here for folow up.\par Besides her usual complaint of diarrhea she is feeling well.\par Recd 1 unit PRBC last week.\par Has stopped Hydrea.\par No fever, mouth sores.\par \par 9/29/20\par Pt is here for folow up.\par No new complaints.\par Is seeing GI for diarrhea net week.\par No fever, night sweats.\par REcd 3 units of PRBC this month\par \par 10/13/20\par Pt is here for follow up.\par No new complaints.\par Has not needed a transfusion since 3 weeks ago.\par Continues to have diarrhea, waiting to be seen by GI\par \par 10/26/20\par Pt is here for follow up.\par C/O feeling fatigued.\par Has CLARK.\par No nausea or vomiting.\par No fever.\par Diarrhea has improved a little. She is no longer on Metformin\par \par 11/9/20\par Pt is here for follow up.\par Feels well. Denies SOB, CP, fatigue\par \par \par 12/7/20- Patient is for follow up and is due for cycle 9 of Vidaza.  She still has loose BM sometimes 10 times a day and was seeing Dr. Corey from GI- did not follow up recently and is unable to get an appt with him. She has lost about 10 lbs since September. No N/V. No fever or chills. No CP/SOB. She has been getting PRBC transfusion every 3 weeks now\par \par 01/04/20 Pt presented today for f/u visit . She is s/p 8 cycles of vidaza and was started on Luspatercept in Dec , 2020 . So far she received 1 injection , she is due for 2nd injection today . Adverse effect profile was discussed with her in detail , she reports having some dizziness and weakness after first injection . She received blood transfusion last wk . Blood work from today reviewed as well and counts are adequate . She denies any fevers,  chills,  or any new symptoms . \par \par 1/25/21\par Pt is here for follow up.\par C/O diarrhea, otherwise feeling better.\par Has not needed a transfusion since 12/29\par \par 2/16/21\par Pt is here for follow up.\par Needed transfusion on 2/8/21.\par Continues to C/o fatigue. Diarrhea remains the same, has not made GI appt as yet.\par \par 3/8/21\par Pt is here for follow up.\par Feels better today. Recd 1 unit PRBCs last week.\par Diarrhea is same as before. has an appt with GI next week.\par No fever\par \par 3/30/21\par Pt is here for follow up.\par Feels better. Last transfusion was on 3/2/21\par Saw GI and was started on cholestyramine and metformin was DCED with resolution of diarrhea.\par She denies any SOB,\par Fatigue is better\par \par 4/20/2021\par Pt is here to f/u for MDS\par She is s/p Luspatercept cycle #6\par She is compliant with Aspirin\par She feels better today, appetite is good\par She denies shortness of breath, fatigue, or weakness \par She c/o of diarrhea but it has improved since she was started on Cholestyramine. Stool is soft and less in frequency\par She received COVID vaccine x 2 doses\par \par 5/11/21\par pt is here for follow up.\par Feels the same with fatigue, diarrhea.\par No SOB\par \par 6/21/21\par Pt is here for follow up.\par Feels well. No SOB, CP, N< V.\par Diarrhea is better controlled now.\par Last PRBC transfusion was 2 weeks ago.\par  [de-identified] : \par

## 2021-06-21 NOTE — PHYSICAL EXAM
[Restricted in physically strenuous activity but ambulatory and able to carry out work of a light or sedentary nature] : Status 1- Restricted in physically strenuous activity but ambulatory and able to carry out work of a light or sedentary nature, e.g., light house work, office work [Normal] : affect appropriate [de-identified] : conjunctival pallor

## 2021-06-29 ENCOUNTER — APPOINTMENT (OUTPATIENT)
Dept: INFUSION THERAPY | Facility: CLINIC | Age: 76
End: 2021-06-29

## 2021-06-29 ENCOUNTER — LABORATORY RESULT (OUTPATIENT)
Age: 76
End: 2021-06-29

## 2021-06-29 ENCOUNTER — EMERGENCY (EMERGENCY)
Facility: HOSPITAL | Age: 76
LOS: 0 days | Discharge: HOME | End: 2021-06-29
Attending: EMERGENCY MEDICINE | Admitting: EMERGENCY MEDICINE
Payer: MEDICARE

## 2021-06-29 VITALS
OXYGEN SATURATION: 100 % | TEMPERATURE: 99 F | DIASTOLIC BLOOD PRESSURE: 63 MMHG | HEART RATE: 86 BPM | SYSTOLIC BLOOD PRESSURE: 141 MMHG | RESPIRATION RATE: 18 BRPM

## 2021-06-29 VITALS
HEIGHT: 62 IN | SYSTOLIC BLOOD PRESSURE: 121 MMHG | TEMPERATURE: 99 F | HEART RATE: 93 BPM | RESPIRATION RATE: 18 BRPM | DIASTOLIC BLOOD PRESSURE: 56 MMHG | OXYGEN SATURATION: 100 %

## 2021-06-29 DIAGNOSIS — Z79.82 LONG TERM (CURRENT) USE OF ASPIRIN: ICD-10-CM

## 2021-06-29 DIAGNOSIS — Z79.899 OTHER LONG TERM (CURRENT) DRUG THERAPY: ICD-10-CM

## 2021-06-29 DIAGNOSIS — R42 DIZZINESS AND GIDDINESS: ICD-10-CM

## 2021-06-29 DIAGNOSIS — R23.1 PALLOR: ICD-10-CM

## 2021-06-29 DIAGNOSIS — I10 ESSENTIAL (PRIMARY) HYPERTENSION: ICD-10-CM

## 2021-06-29 DIAGNOSIS — R53.1 WEAKNESS: ICD-10-CM

## 2021-06-29 DIAGNOSIS — D64.9 ANEMIA, UNSPECIFIED: ICD-10-CM

## 2021-06-29 DIAGNOSIS — Z86.2 PERSONAL HISTORY OF DISEASES OF THE BLOOD AND BLOOD-FORMING ORGANS AND CERTAIN DISORDERS INVOLVING THE IMMUNE MECHANISM: ICD-10-CM

## 2021-06-29 DIAGNOSIS — E11.65 TYPE 2 DIABETES MELLITUS WITH HYPERGLYCEMIA: ICD-10-CM

## 2021-06-29 DIAGNOSIS — Z79.84 LONG TERM (CURRENT) USE OF ORAL HYPOGLYCEMIC DRUGS: ICD-10-CM

## 2021-06-29 LAB
ANION GAP SERPL CALC-SCNC: 14 MMOL/L — SIGNIFICANT CHANGE UP (ref 7–14)
ANISOCYTOSIS BLD QL: SLIGHT — SIGNIFICANT CHANGE UP
BASE EXCESS BLDV CALC-SCNC: -2.6 MMOL/L — LOW (ref -2–2)
BASOPHILS # BLD AUTO: 0.24 K/UL — HIGH (ref 0–0.2)
BASOPHILS NFR BLD AUTO: 2.6 % — HIGH (ref 0–1)
BLD GP AB SCN SERPL QL: SIGNIFICANT CHANGE UP
BUN SERPL-MCNC: 25 MG/DL — HIGH (ref 10–20)
CA-I SERPL-SCNC: 1.21 MMOL/L — SIGNIFICANT CHANGE UP (ref 1.12–1.3)
CALCIUM SERPL-MCNC: 9.1 MG/DL — SIGNIFICANT CHANGE UP (ref 8.5–10.1)
CHLORIDE SERPL-SCNC: 96 MMOL/L — LOW (ref 98–110)
CO2 SERPL-SCNC: 21 MMOL/L — SIGNIFICANT CHANGE UP (ref 17–32)
CREAT SERPL-MCNC: 1.5 MG/DL — SIGNIFICANT CHANGE UP (ref 0.7–1.5)
EOSINOPHIL # BLD AUTO: 0 K/UL — SIGNIFICANT CHANGE UP (ref 0–0.7)
EOSINOPHIL NFR BLD AUTO: 0 % — SIGNIFICANT CHANGE UP (ref 0–8)
GAS PNL BLDV: 135 MMOL/L — LOW (ref 136–145)
GAS PNL BLDV: SIGNIFICANT CHANGE UP
GIANT PLATELETS BLD QL SMEAR: PRESENT — SIGNIFICANT CHANGE UP
GLUCOSE SERPL-MCNC: 385 MG/DL — HIGH (ref 70–99)
HCO3 BLDV-SCNC: 23 MMOL/L — SIGNIFICANT CHANGE UP (ref 22–29)
HCT VFR BLD CALC: 17.7 % — LOW (ref 37–47)
HCT VFR BLD CALC: 21.3 %
HCT VFR BLDA CALC: 24.7 % — LOW (ref 34–44)
HGB BLD CALC-MCNC: 8.1 G/DL — LOW (ref 14–18)
HGB BLD-MCNC: 5.9 G/DL — CRITICAL LOW (ref 12–16)
HGB BLD-MCNC: 6.8 G/DL
HYPOCHROMIA BLD QL: SLIGHT — SIGNIFICANT CHANGE UP
LACTATE BLDV-MCNC: 3.8 MMOL/L — HIGH (ref 0.5–1.6)
LYMPHOCYTES # BLD AUTO: 0.72 K/UL — LOW (ref 1.2–3.4)
LYMPHOCYTES # BLD AUTO: 7.9 % — LOW (ref 20.5–51.1)
MACROCYTES BLD QL: SLIGHT — SIGNIFICANT CHANGE UP
MANUAL SMEAR VERIFICATION: SIGNIFICANT CHANGE UP
MCHC RBC-ENTMCNC: 31.9 G/DL
MCHC RBC-ENTMCNC: 33.1 PG — HIGH (ref 27–31)
MCHC RBC-ENTMCNC: 33.3 G/DL — SIGNIFICANT CHANGE UP (ref 32–37)
MCHC RBC-ENTMCNC: 34 PG
MCV RBC AUTO: 106.5 FL
MCV RBC AUTO: 99.4 FL — HIGH (ref 81–99)
MICROCYTES BLD QL: SLIGHT — SIGNIFICANT CHANGE UP
MONOCYTES # BLD AUTO: 0.56 K/UL — SIGNIFICANT CHANGE UP (ref 0.1–0.6)
MONOCYTES NFR BLD AUTO: 6.2 % — SIGNIFICANT CHANGE UP (ref 1.7–9.3)
NEUTROPHILS # BLD AUTO: 7.57 K/UL — HIGH (ref 1.4–6.5)
NEUTROPHILS NFR BLD AUTO: 83.3 % — HIGH (ref 42.2–75.2)
NRBC # BLD: 1 /100 — HIGH (ref 0–0)
NRBC # BLD: SIGNIFICANT CHANGE UP /100 WBCS (ref 0–0)
OVALOCYTES BLD QL SMEAR: SLIGHT — SIGNIFICANT CHANGE UP
PCO2 BLDV: 40 MMHG — LOW (ref 41–51)
PH BLDV: 7.36 — SIGNIFICANT CHANGE UP (ref 7.26–7.43)
PLAT MORPH BLD: ABNORMAL
PLATELET # BLD AUTO: 541 K/UL
PLATELET # BLD AUTO: 576 K/UL — HIGH (ref 130–400)
PMV BLD: 11 FL
PO2 BLDV: 21 MMHG — SIGNIFICANT CHANGE UP (ref 20–40)
POIKILOCYTOSIS BLD QL AUTO: SLIGHT — SIGNIFICANT CHANGE UP
POLYCHROMASIA BLD QL SMEAR: SLIGHT — SIGNIFICANT CHANGE UP
POTASSIUM BLDV-SCNC: 4.8 MMOL/L — SIGNIFICANT CHANGE UP (ref 3.3–5.6)
POTASSIUM SERPL-MCNC: 5.7 MMOL/L — HIGH (ref 3.5–5)
POTASSIUM SERPL-SCNC: 5.7 MMOL/L — HIGH (ref 3.5–5)
RBC # BLD: 1.78 M/UL — LOW (ref 4.2–5.4)
RBC # BLD: 2 M/UL
RBC # FLD: 22.2 % — HIGH (ref 11.5–14.5)
RBC # FLD: 22.4 %
RBC BLD AUTO: ABNORMAL
SAO2 % BLDV: 35 % — SIGNIFICANT CHANGE UP
SODIUM SERPL-SCNC: 131 MMOL/L — LOW (ref 135–146)
WBC # BLD: 9.09 K/UL — SIGNIFICANT CHANGE UP (ref 4.8–10.8)
WBC # FLD AUTO: 9.09 K/UL — SIGNIFICANT CHANGE UP (ref 4.8–10.8)
WBC # FLD AUTO: 9.66 K/UL

## 2021-06-29 PROCEDURE — 99285 EMERGENCY DEPT VISIT HI MDM: CPT

## 2021-06-29 PROCEDURE — 71045 X-RAY EXAM CHEST 1 VIEW: CPT | Mod: 26

## 2021-06-29 PROCEDURE — 93010 ELECTROCARDIOGRAM REPORT: CPT

## 2021-06-29 RX ORDER — ERYTHROPOIETIN 10000 [IU]/ML
80000 INJECTION, SOLUTION INTRAVENOUS; SUBCUTANEOUS ONCE
Refills: 0 | Status: DISCONTINUED | OUTPATIENT
Start: 2021-06-29 | End: 2021-06-29

## 2021-06-29 RX ORDER — INSULIN HUMAN 100 [IU]/ML
6 INJECTION, SOLUTION SUBCUTANEOUS ONCE
Refills: 0 | Status: COMPLETED | OUTPATIENT
Start: 2021-06-29 | End: 2021-06-29

## 2021-06-29 RX ORDER — ERYTHROPOIETIN 10000 [IU]/ML
80000 INJECTION, SOLUTION INTRAVENOUS; SUBCUTANEOUS ONCE
Refills: 0 | Status: COMPLETED | OUTPATIENT
Start: 2021-06-29 | End: 2021-06-29

## 2021-06-29 RX ORDER — LUSPATERCEPT 75 MG/1
110 INJECTION, POWDER, LYOPHILIZED, FOR SOLUTION SUBCUTANEOUS ONCE
Refills: 0 | Status: COMPLETED | OUTPATIENT
Start: 2021-06-29 | End: 2021-06-29

## 2021-06-29 RX ADMIN — INSULIN HUMAN 6 UNIT(S): 100 INJECTION, SOLUTION SUBCUTANEOUS at 15:57

## 2021-06-29 RX ADMIN — ERYTHROPOIETIN 80000 UNIT(S): 10000 INJECTION, SOLUTION INTRAVENOUS; SUBCUTANEOUS at 12:30

## 2021-06-29 RX ADMIN — LUSPATERCEPT 110 MILLIGRAM(S): 75 INJECTION, POWDER, LYOPHILIZED, FOR SOLUTION SUBCUTANEOUS at 12:30

## 2021-06-29 NOTE — ED ADULT NURSE NOTE - NSIMPLEMENTINTERV_GEN_ALL_ED
Implemented All Fall with Harm Risk Interventions:  [place] to call system. Call bell, personal items and telephone within reach. Instruct patient to call for assistance. Room bathroom lighting operational. Non-slip footwear when patient is off stretcher. Physically safe environment: no spills, clutter or unnecessary equipment. Stretcher in lowest position, wheels locked, appropriate side rails in place. Provide visual cue, wrist band, yellow gown, etc. Monitor gait and stability. Monitor for mental status changes and reorient to person, place, and time. Review medications for side effects contributing to fall risk. Reinforce activity limits and safety measures with patient and family. Provide visual clues: red socks.

## 2021-06-29 NOTE — ED PROVIDER NOTE - PATIENT PORTAL LINK FT
You can access the FollowMyHealth Patient Portal offered by [facility] by registering at the following website: [email or web address]. By joining [employer]’s FollowMyHealth portal, you will also be able to view your health information using other applications (apps) compatible with our system.

## 2021-06-29 NOTE — ED PROVIDER NOTE - CLINICAL SUMMARY MEDICAL DECISION MAKING FREE TEXT BOX
Patient is a good candidate to attempt outpatient management. Supportive care and home care discussed in detail. Patient aware they may have to return for re-evaluation and possible admission if outpatient treatment fails. Strict return precautions discussed.  s/p transfusion, feeling better, all results reviewed with copy provided, reports will follow up.

## 2021-06-29 NOTE — ED PROVIDER NOTE - ATTENDING CONTRIBUTION TO CARE
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

## 2021-06-29 NOTE — ED PROVIDER NOTE - OBJECTIVE STATEMENT
The patient is a 76 year old female with a PMHx of MDS presenting to the ED following a routine hematology appt for a Hgb of 6.8. She was receiving her scheduled weekly Epo when she was found to have low Hgb and was recommended to present to the ED for a transfusion. She has receiving transfusions x2 since 5/20/2021. At present she is complaining of some lightheadedness when she is walking around, but otherwise is asymptomatic. She denies any recent N/V; she has chronic diarrhea 2/2 medication side effect, but this is currently unchanged from baseline. There has not been any blood with bowel movements, tarry stools, hematuria, vaginal bleeding, recent trauma, or recent falls. No sensation or the room spinning, or any other neurological complaints like motor or sensory deficits.

## 2021-06-29 NOTE — ED PROVIDER NOTE - PROGRESS NOTE DETAILS
ATTENDING NOTE: I personally evaluated the patient. I reviewed the Resident’s note (as assigned above), and agree with the findings and plan except as documented in my note.   75 y/o F with PMH of T2DM, HTN, B12 deficiency, on monthly B12 shots, and low risk MDS, DX’d 5 years ago, on Revlimid and Vidaza therapy, follows with Dr. Gonzalez and receives Epo shots every 28 days, was at the [facility] today for her dose when her HGB was noted to be 6.8, now sent in for transfusion. Last transfusions on 5/20 and 6/1. Notes mild dizziness and general weakness, no room spinning sensation. SX mildly worsened with ambulation. Non-smoker, no alcohol use. Denies fever, chills, n/v, cp, sob, pleuritic cp, palpitations, diaphoresis, cough, tinnitus, ear pain, hearing loss, neck pain/stiffness, back pain, photophobia/phonophobia, blurry vision/visual changes, abd pain, diarrhea, constipation, melena/brbpr, urinary symptoms, numbness/tingling, HA, syncope, sick contacts, trauma, recent travel or rash.   on exam: wdwn female sitting on stretcher in nad, no rash, no signs of trauma, PERRL, EOMI, no nystagmus, palor, mmm, neck supple, no spinous ttp to neck or back, FROM, no palpable shelves or step offs, no meningeal signs, regular rate, radial pulses 2/4 b/l, ctabl w/ breath sounds present b/l, no wheezing or crackles, good air exchange, good respiratory effort, no accessory muscle use, no tachypnea, no stridor, bs present throughout all 4 quadrants, abd soft, nd, nt, no rebound tenderness or guarding, no cvat, FROM of upper an lower ext, no calf pain/swelling/erythema, AAOx3. Motor 5/5 and sensation intact throughout UE and LE. CN II-XII intact. No facial droop or slurring of speech. (-) Pronator (-) Romberg, no dysmetria w/ ftn or rapid alternating fine movements, heel to shin intact. NIH O.  Plan for labs, transfusion, reassess. Blood transfusion consent signed by patient, resident, and supervising attending. Left with  to be put in patient chart. ED Attending ANKIT Mercedes  pt with hgb of 5.9, consented for blood transfusion , witnessed, one unit ordered, glucose 385 (pt ate pta)< took metformin earlier this morning, ag normal at 14, k 5.7, reports her K runs high, previous was 5.8, will give dose of insulin, pending ekg, pt aware, resting comfortably in nad, will continue to monitor and reassess. ATTENDING NOTE: I personally evaluated the patient. I reviewed the Resident’s note (as assigned above), and agree with the findings and plan except as documented in my note.   75 y/o F with PMH of T2DM, HTN, B12 deficiency, on monthly B12 shots, and low risk MDS, DX’d 5 years ago, on Revlimid and Vidaza therapy, follows with Dr. Wade, was at the [facility] today for her dose of meds when her HGB was noted to be low, now sent in for transfusion. Last transfusions on 5/20 and 6/1. Notes mild dizziness and general weakness, no room spinning sensation. SX mildly worsened with ambulation. Non-smoker, no alcohol use. Denies fever, chills, n/v, cp, sob, pleuritic cp, palpitations, diaphoresis, cough, tinnitus, ear pain, hearing loss, neck pain/stiffness, back pain, photophobia/phonophobia, blurry vision/visual changes, abd pain, diarrhea, constipation, melena/brbpr, urinary symptoms, numbness/tingling, HA, syncope, sick contacts, trauma, recent travel or rash.   on exam: wdwn female sitting on stretcher in nad, no rash, no signs of trauma, PERRL, EOMI, no nystagmus, (+) pallor, mmm, neck supple, no spinous ttp to neck or back, FROM, no palpable shelves or step offs, no meningeal signs, regular rate, radial pulses 2/4 b/l, ctabl w/ breath sounds present b/l, no wheezing or crackles,  no accessory muscle use, no tachypnea, no stridor, bs present throughout all 4 quadrants, abd soft, nd, nt, no rebound tenderness or guarding, no cvat, FROM of upper an lower ext, no calf pain/swelling/erythema, AAOx3. Motor 5/5 and sensation intact throughout UE and LE. CN II-XII intact. No facial droop or slurring of speech. (-) Pronator (-) Romberg, no dysmetria w/ ftn or rapid alternating fine movements. NIHSS O.  Plan for labs, transfusion, reassess. ED Attending ANKIT Mercedes  pt with hgb of 5.9, consented for blood transfusion , witnessed, one unit ordered, glucose 385 (pt ate pta), took metformin earlier this morning, ag normal at 14, k 5.7, reports her K runs high, previous was 5.8, will give dose of insulin, pending ekg, pt aware, resting comfortably in nad, will continue to monitor and reassess. ED Attending ANKIT Mercedes  pt reports feeling better, k 4.8 on vbg now, with improved hgb, lactate noted but tourniquet was placed to obtain blood, pt reports she does not want to repeat this as she feels better, receiving unit of crispine, no complaints, will continue to monitor and reassess. ED Attending ANKIT Mercedes  pt feeling better, s/p infusion, aware of strict return precautions, follow up with pmd and Dr. Wade, copy of all results provided, tp feeling better and comfortable and would like to go home, reports will follow up.

## 2021-06-29 NOTE — ED PROVIDER NOTE - DATE/TIME 5
[Ambulatory and capable of all self care but unable to carry out any work activities] : Status 2- Ambulatory and capable of all self care but unable to carry out any work activities. Up and about more than 50% of waking hours [Normal] : affect appropriate [de-identified] : restricted ROM, w tenderness and spasms r paracervical/spinal regions [de-identified] : /80 [de-identified] : incisions of 1 inch behind r ear, lat neck and r flank of 3 inches. 29-Jun-2021 19:46

## 2021-06-29 NOTE — ED PROVIDER NOTE - PHYSICAL EXAMINATION
VITAL SIGNS: I have reviewed nursing notes and confirm.  CONSTITUTIONAL: well-appearing, non-toxic, NAD  SKIN: Warm dry, normal skin turgor  HEAD: NCAT  EYES: no scleral icterus, no conjunctival injection  ENT: Moist mucous membranes, normal pharynx with no erythema or exudates  NECK: Supple; non tender. Full ROM. No cervical LAD  CARD: RRR, no murmurs, rubs or gallops  RESP: clear to ausculation b/l.  No rales, rhonchi, or wheezing.  ABD: soft, + BS, non-tender, non-distended, no rebound or guarding. No CVA tenderness  EXT: Full ROM, no bony tenderness, no pedal edema, no calf tenderness  NEURO: normal motor. normal sensory. CN II-XII intact. Cerebellar testing normal. Normal gait.  PSYCH: Cooperative, appropriate.

## 2021-06-29 NOTE — ED PROVIDER NOTE - CARE PROVIDER_API CALL
Kaveh Wade)  Hematology; Internal Medicine; Medical Oncology  [address] [place] [address]  Phone: [phone]  Fax: [phone]  Follow Up Time: 1-3 Days

## 2021-06-29 NOTE — ED ADULT NURSE NOTE - OBJECTIVE STATEMENT
75 y/o female presents to ED for blood transfusion after low Hg result at [facility]. pt states she does not know what the result was, she gets 4-5 transfusions a year, without any reaction. pt has been experiencing dizziness and weakness, denies SOB, LOC, or falls.

## 2021-06-29 NOTE — ED PROVIDER NOTE - NS ED ROS FT
Constitutional:  No fever, chills, lethargy, or abnormal weight loss  Eyes:  No eye pain or visual changes  ENMT: No nasal discharge, no toothache, no sore throat. No neck pain or stiffness  Cardiac:  No chest pain or palpitations  Respiratory:  No cough or respiratory distress.   GI:  No nausea, vomiting, or abdominal pain. No changes to chronic diarrhea   :  No dysuria, frequency or burning. No hematuria.   MS:  No back or joint pain.  Neuro:  No headache. No numbness or tingling. +Lightheadedness/general weakness.   Skin:  No skin rash  Except as documented in the HPI,  all other systems are negative

## 2021-07-06 ENCOUNTER — APPOINTMENT (OUTPATIENT)
Dept: INFUSION THERAPY | Facility: CLINIC | Age: 76
End: 2021-07-06

## 2021-07-13 ENCOUNTER — LABORATORY RESULT (OUTPATIENT)
Age: 76
End: 2021-07-13

## 2021-07-13 ENCOUNTER — APPOINTMENT (OUTPATIENT)
Dept: INFUSION THERAPY | Facility: CLINIC | Age: 76
End: 2021-07-13

## 2021-07-13 LAB
HCT VFR BLD CALC: 22.7 %
HGB BLD-MCNC: 7.3 G/DL
MCHC RBC-ENTMCNC: 31.7 PG
MCHC RBC-ENTMCNC: 32.2 G/DL
MCV RBC AUTO: 98.7 FL
PLATELET # BLD AUTO: 577 K/UL
PMV BLD: 10.5 FL
RBC # BLD: 2.3 M/UL
RBC # FLD: 20.4 %
WBC # FLD AUTO: 7.24 K/UL

## 2021-07-13 RX ORDER — LUSPATERCEPT 75 MG/1
110 INJECTION, POWDER, LYOPHILIZED, FOR SOLUTION SUBCUTANEOUS ONCE
Refills: 0 | Status: DISCONTINUED | OUTPATIENT
Start: 2021-07-13 | End: 2021-07-13

## 2021-07-13 RX ORDER — ERYTHROPOIETIN 10000 [IU]/ML
80000 INJECTION, SOLUTION INTRAVENOUS; SUBCUTANEOUS ONCE
Refills: 0 | Status: DISCONTINUED | OUTPATIENT
Start: 2021-07-13 | End: 2021-07-13

## 2021-07-13 RX ORDER — PREGABALIN 225 MG/1
1000 CAPSULE ORAL ONCE
Refills: 0 | Status: COMPLETED | OUTPATIENT
Start: 2021-07-13 | End: 2021-07-13

## 2021-07-13 RX ORDER — ERYTHROPOIETIN 10000 [IU]/ML
80000 INJECTION, SOLUTION INTRAVENOUS; SUBCUTANEOUS ONCE
Refills: 0 | Status: COMPLETED | OUTPATIENT
Start: 2021-07-13 | End: 2021-07-13

## 2021-07-13 RX ADMIN — PREGABALIN 1000 MICROGRAM(S): 225 CAPSULE ORAL at 13:50

## 2021-07-13 RX ADMIN — ERYTHROPOIETIN 80000 UNIT(S): 10000 INJECTION, SOLUTION INTRAVENOUS; SUBCUTANEOUS at 13:50

## 2021-07-19 ENCOUNTER — APPOINTMENT (OUTPATIENT)
Dept: INFUSION THERAPY | Facility: CLINIC | Age: 76
End: 2021-07-19
Payer: MEDICARE

## 2021-07-19 ENCOUNTER — LABORATORY RESULT (OUTPATIENT)
Age: 76
End: 2021-07-19

## 2021-07-19 ENCOUNTER — APPOINTMENT (OUTPATIENT)
Dept: HEMATOLOGY ONCOLOGY | Facility: CLINIC | Age: 76
End: 2021-07-19
Payer: MEDICARE

## 2021-07-19 VITALS
SYSTOLIC BLOOD PRESSURE: 130 MMHG | WEIGHT: 143 LBS | DIASTOLIC BLOOD PRESSURE: 98 MMHG | HEART RATE: 117 BPM | HEIGHT: 62 IN | BODY MASS INDEX: 26.31 KG/M2 | TEMPERATURE: 98.3 F

## 2021-07-19 LAB
HCT VFR BLD CALC: 21.8 %
HGB BLD-MCNC: 7.1 G/DL
MCHC RBC-ENTMCNC: 31.8 PG
MCHC RBC-ENTMCNC: 32.6 G/DL
MCV RBC AUTO: 97.8 FL
PLATELET # BLD AUTO: 633 K/UL
PMV BLD: 9.5 FL
RBC # BLD: 2.23 M/UL
RBC # FLD: 20.9 %
WBC # FLD AUTO: 8.72 K/UL

## 2021-07-19 PROCEDURE — 99215 OFFICE O/P EST HI 40 MIN: CPT

## 2021-07-19 RX ORDER — ERYTHROPOIETIN 10000 [IU]/ML
80000 INJECTION, SOLUTION INTRAVENOUS; SUBCUTANEOUS ONCE
Refills: 0 | Status: COMPLETED | OUTPATIENT
Start: 2021-07-19 | End: 2021-07-19

## 2021-07-19 RX ORDER — LUSPATERCEPT 75 MG/1
110 INJECTION, POWDER, LYOPHILIZED, FOR SOLUTION SUBCUTANEOUS ONCE
Refills: 0 | Status: COMPLETED | OUTPATIENT
Start: 2021-07-19 | End: 2021-07-19

## 2021-07-19 RX ADMIN — LUSPATERCEPT 110 MILLIGRAM(S): 75 INJECTION, POWDER, LYOPHILIZED, FOR SOLUTION SUBCUTANEOUS at 15:32

## 2021-07-19 RX ADMIN — ERYTHROPOIETIN 80000 UNIT(S): 10000 INJECTION, SOLUTION INTRAVENOUS; SUBCUTANEOUS at 15:32

## 2021-07-19 NOTE — HISTORY OF PRESENT ILLNESS
[de-identified] : She missed on appointment for procrit last week.\par She starts her next cycle on 6/25/18\par C/o back pain radiating down her left which occurred when she bent to  something.\par No change in diarrhea.\par \par 7/31/18:\par Doing well. On Revlimid for more than 2yrs, 5mg 2weeks on 1 week off. She will be starting week on tonight. \par C/o diarrhea. On Imodium 2 pills AM and 2 pills PM. Doesn't help much with diarrhea. \par C/o nausea, abd pain. \par \par \par Colonoscopy in 2016 was normal. \par Did not have blood transfusion for over 2 years. \par On procrit [id number] weekly. Missed last week on 7/24/18 as she was out of town. She has been non compliant with weekly Procrit.\par Mammogram in 2017 was normal. \par \par 9/11/18\par pt is here for follow up.\par She feels the lomotil helps with the diarrhea.\par She was away for a few weeks and missed her procrit injections.\par No fever, abdominal  discomfort has been less since since use of lomotil.\par She started a new cycle 2 days ago.\par \par 10/2/18:\par She will start Revlimid tonight (week on). 2 weeks on, 1 week off. \par On ASA 81mg. \par Denies fever, nausea, vomiting, chest pain, SOB, abdominal pain, and bladder problems.\par C/o diarrhea. \par S/p 2 units PRBC transfusion on 9/25/18. \par On Procrit 18906 units weekly. Not compliant every week. \par C/o intermittent dizziness. \par \par 10/31/18\par Pt is here for follow up.\par C/O significant fatigue.\par Continues to have diarrhea, takes imodium and lomotil as needed.\par C/o dry cough, no fever.\par Cough started a month ago. It is worse in the mornings however over last week it has been constant all day.\par No chest pain.\par She was found to have low B12 levels and was started on B12 injections.\par \par 11/27/18:\par Doing well. Hospitalized for 3 days for cellulitis/abcess of the back s/p drainage and on Abx currently. Developed 3 days after Mg infusion as per pt. \par Denies nausea, vomiting, chest pain, SOB, abdominal pain, bowel and bladder problems.\par This is the week on Revlimid (week 1).\par S/p 1 unit PRBC transfusion in the hospital. \par On Procrit weekly \par \par 12/27/18:\par Doing well. No major complaints.\par Denies fever, nausea, vomiting, chest pain, SOB, abdominal pain, and bladder problems.\par On Revlimid 5 mg 2 weeks on 1 week off. This is the week off. She will start the week on sunday (12/30/18).\par Due for Procrit 60083 units today. \par Still has diarrhea. 4-5bm/day.\par On monthly B12 injections. \par \par 1/30/19:\par Patient here for follow up for MDS.  She is taking Revlimid 5 mg, 2 weeks on, 1 week off.  She will complete this current cycle on Saturday.  She has no new complaints today.  Patient denies cough, shortness of breath, denies fever, denies bone pain.\par \par 03/04/2019\par Patient is here for a follow up visit. She complaints of severe pain in her left shoulder and has had abscess drained 2 weeks ago. However the pain is worse and she has purulent discharge on examination. She also has Nasal sores that are painful. Culture from 11/2018 showed MRSA.  Denies Fever. \par She also complaints of swelling in her right leg. Not tender and not erythematous and negative Gong;s sign. \par Her diarrhea with Revlimid is ongoing and Imodium and Lomotil helps but not everyday. \par She is on 60,000 units of procrit weekly and Revlimid 5 mg 2 weeks on 1 week off. \par \par 4/23/19\par Pt is here for follow up.\par She feels well.\par The nasal sores have improved with bactroban\par The abscess on left shoulder has resolved.\par However she has a new one on the middle of her back.\par No fever.\par Pt has been on procrit 85356 units weekly, however she missed a dose in between.\par \par 5/8/19\par pt is here for follow up.\par no new complaints.\par feels well.\par Her skin abscess has resolved.\par she has been unable to go to ID, as she could not get an appt.\par No bleeding.\par She is compliant with revlimid.\par \par 6/6/19\par Pt is here for follow up.\par no new complaints \par Feels well.\par Is on week 2 of her Revlimid cycle. \par No transfusions since last visit\par \par 7/17/19\par Pt is here for follow up.\par C/O fatigue.\par Was away for a few days two weeks ago, but missed treatment with procrit for 2 weeks.\par Is complaint with Revlimid 2 weeks on 1 week off.\par Diarrhea is a little improved.\par \par \par 8/14/19\par Patient here for follow up visit, feeling well.  Although she is complaining of some pain at the left scapula area recently.  Patient denies cough, shortness of breath, denies fever, denies other bone pain.  She is off Revlimid  this week, due to restart on Monday.  She is scheduled for Procrit and Vitamin B12 injection today.  She plans to leave the country tomorrow to visit her mother who is ill.  She will return in about 10 days.\par \par 9/11/19\par Pt is here for follow up.\par She was away for 3 weeks, out of which she took procrit for 2 weeks in [place].\par She missed last week's dose.\par She had CBCs in [place] which showed Hgb of 8.9\par She feels well.\par She still getting diarrhea.\par She has been using lomotil with very minimal relief.\par She started a new cycle of Revlimid 4 days ago.\par \par 10/3/19\par Patient here for follow up visit, feeling fairly well.   Patient denies cough, shortness of breath, denies fever, denies other bone pain.  She remains on Revlimid.  She is scheduled for Procrit and Vitamin B12 injection today.\par \par 10/24/19\par Pt is here for follow up.\par Feels well.\par No SOB, fatigue.\par Compliant with procrit and Revl;imid.\par Remains on ASa.\par Diarrhea is unchanged.\par Pt takes imodium as needed.\par \par 11/14/19\par Pt is here for follow up.\par No new complaints.\par Starts a new cycle of Revlimid today.\par Diarrhea is same as before, no rectal bleeding.\par No fever.\par \par 12/5/19\par Pt is here for follow up.\par No new complaints.\par Denies feeling weaker than usual.\par No fever, SOB.\par No change in frequency of diarrhea.\par No pain.\par Will start next cycle of Revlimid in 3 days.\par \par \par !/16/20\par Pt was recently DC[facility] from [facility] 3 days ago after being treated for pneumonia and MARCO.\par She is on po Levaquin.\par She restarted Revlmid 3 days ago.\par She is feeling better now. No fever.\par No SOB, Chest pain and back pain has resolved.\par Pt has a cough, no expectoration.\par She also recd a unit of PRBCs last week in the hospital\par \par \par 1/30/20\par Patient here for follow up visit, feeling well.  She has no new complaints. Cough is almost gone completely.  Patient denies shortness of breath, denies fever, denies bone pain.  Her appetite is ok, weight is stable.  She is due to restart Revlimid on Monday.\par \par 02/20/20\par Pt is here for follow up, feeling well.\par Offers no new complaints. Denies SOB, fever, chills, weight loss, CP, cough. \par Currently off week of Revlimid 5 mg. Restarts on Monday.\par Has procrit 80,000 units today. Next b 12 injection due in 2 weeks \par Did not get chest Xray\par CBC reviewed WBC 3.1, h/h 8.3/25%, plt 386, neutrophils 1.29\par \par 3/12/20\par Pt is here for follow up.\par She took Revlimid for 2 weeks and then has been off for one week although she was advised to take it daily without break.\par No SOB, Cough, fever.\par Has mild fatigue.\par \par 04/02/2020\par SIMONA KUHN a 75 year F is here today for follow up visit of MDS.\par Denies fever, chills, cough,night sweats, weight loss. \par Denies bleeding or bruising.\par Pt receives procrit 80,000 units weekly and B12 IM monthly. \par Continued Revlimid 5 mg daily x 3 weeks, has 1 dose left tonight. \par CBC reviewed: WBC 3.2, H/H 8.1/25.2, neutrophils 1.6, PLT 72\par \par 4/20/2020: The patient is here for follow up . She has no complaints of fever, chills, dizziness , chest pain and shortness of breath . She is still with diarrhea , up to 10 watery BMs per day, responds poorly to imodium and lomotil. She is on Revlimid 5 mg daily , took last dose yesterday night. Her last Procrit was on April 13. \par \par 5/26/20\par Pt is here for follow up.\par She is feeling well. Denies SOB, chest pain, fever.\par Continues to have diarrhea although she is off of Revlmid.\par Thinks it may due to diabetic meds.\par Wants to discuss BM bx results\par \par 6/22/20\par Pt is here for follow up.\par Feels well. Denies any fever, N, V, D\par Continues to have diarrhea, she will talk to her PCP about changing metformin for DM.\par Has been needing PRBCs 1 unit each month\par \par 7/20/20\par Pt is here for follow up.\par No new complaints. Has been feeling well.\par No SOB, CP. \par No N, V, D.\par She has recd 2 units of PRBCs since May 20.\par \par 8/17/20\par Pt is here for a follow up visit.\par She is feeling well.\par No new symptoms. No N, V, D.\par No bruising or bleeding. She continues to need PRBCs every 2-3 weeks.\par \par 8/31/20\par Pt is here for follow up. She is feeling well. No N, V, D.\par She is tolerating the hydrea well. No SOB, CP\par No bruising ior bleeding\par \par 9/8/20\par Pt is here for follow up.\par She had been contacted by the [facility] last week to advise her to stop the hydrea. Pt did not check her messages and continued with Hydrea.\par No fever, N, V, bleeding.\par Saw Dr Ferrell last week.\par \par 9/14/20\par Pt is here for folow up.\par Besides her usual complaint of diarrhea she is feeling well.\par Recd 1 unit PRBC last week.\par Has stopped Hydrea.\par No fever, mouth sores.\par \par 9/29/20\par Pt is here for folow up.\par No new complaints.\par Is seeing GI for diarrhea net week.\par No fever, night sweats.\par REcd 3 units of PRBC this month\par \par 10/13/20\par Pt is here for follow up.\par No new complaints.\par Has not needed a transfusion since 3 weeks ago.\par Continues to have diarrhea, waiting to be seen by GI\par \par 10/26/20\par Pt is here for follow up.\par C/O feeling fatigued.\par Has CLARK.\par No nausea or vomiting.\par No fever.\par Diarrhea has improved a little. She is no longer on Metformin\par \par 11/9/20\par Pt is here for follow up.\par Feels well. Denies SOB, CP, fatigue\par \par \par 12/7/20- Patient is for follow up and is due for cycle 9 of Vidaza.  She still has loose BM sometimes 10 times a day and was seeing Dr. Corey from GI- did not follow up recently and is unable to get an appt with him. She has lost about 10 lbs since September. No N/V. No fever or chills. No CP/SOB. She has been getting PRBC transfusion every 3 weeks now\par \par 01/04/20 Pt presented today for f/u visit . She is s/p 8 cycles of vidaza and was started on Luspatercept in Dec , 2020 . So far she received 1 injection , she is due for 2nd injection today . Adverse effect profile was discussed with her in detail , she reports having some dizziness and weakness after first injection . She received blood transfusion last wk . Blood work from today reviewed as well and counts are adequate . She denies any fevers,  chills,  or any new symptoms . \par \par 1/25/21\par Pt is here for follow up.\par C/O diarrhea, otherwise feeling better.\par Has not needed a transfusion since 12/29\par \par 2/16/21\par Pt is here for follow up.\par Needed transfusion on 2/8/21.\par Continues to C/o fatigue. Diarrhea remains the same, has not made GI appt as yet.\par \par 3/8/21\par Pt is here for follow up.\par Feels better today. Recd 1 unit PRBCs last week.\par Diarrhea is same as before. has an appt with GI next week.\par No fever\par \par 3/30/21\par Pt is here for follow up.\par Feels better. Last transfusion was on 3/2/21\par Saw GI and was started on cholestyramine and metformin was DCED with resolution of diarrhea.\par She denies any SOB,\par Fatigue is better\par \par 4/20/2021\par Pt is here to f/u for MDS\par She is s/p Luspatercept cycle #6\par She is compliant with Aspirin\par She feels better today, appetite is good\par She denies shortness of breath, fatigue, or weakness \par She c/o of diarrhea but it has improved since she was started on Cholestyramine. Stool is soft and less in frequency\par She received COVID vaccine x 2 doses\par \par 5/11/21\par pt is here for follow up.\par Feels the same with fatigue, diarrhea.\par No SOB\par \par 6/21/21\par Pt is here for follow up.\par Feels well. No SOB, CP, N< V.\par Diarrhea is better controlled now.\par Last PRBC transfusion was 2 weeks ago.\par \par 7/19/21\par Pt is here for follow up.\par Feels a little tired, last transfusion was 6/1/21.\par No bleeding, fever, SOB\par \par  [de-identified] : \par

## 2021-07-19 NOTE — PHYSICAL EXAM
[Restricted in physically strenuous activity but ambulatory and able to carry out work of a light or sedentary nature] : Status 1- Restricted in physically strenuous activity but ambulatory and able to carry out work of a light or sedentary nature, e.g., light house work, office work [Normal] : affect appropriate [de-identified] : conjunctival pallor

## 2021-07-19 NOTE — ASSESSMENT
[FreeTextEntry1] : # Lowrisk myelodysplastic syndrome, previously treated with  Revlimid and Procrit, now on Vidaza . 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 \par Patient is s/p  9 cycles of Vidaza and she was requiring pRBC transfusion every 3 weeks \par Given the persistent transfusion dependence she was switched to Luspatercept\par \par Hyperkalemia likely due to increased platelet; pseudohyperkalemia.\par \par Plan:\par  I have prepared the note after I reviewed the previous notes, reviewed the radiological and laboratory studies and have communicated those to the patient\par \par \par Pt has been receiving pRBC transfusion almost once a month, last transfusion was on 6/1/21.\par Continue  Luspatercept @ 1.75 mg/kg  today cycle # 10\par \par Adverse effect profile was discussed with her in detail and she was told that they include but are not limited to Hypertension, abdominal pain, transaminitis, fatigue, weakness, arthralgias and muscle aches .\par \par Continue with Procrit 80,000 units for now\par Monitor CBC weekly with transfusions as needed if Hb less than 7 g/dL\par \par \par # Vitamin  B12 deficiency\par - Continue Vitamin B12 injection every month\par \par # Thrombocytosis\par - Continue ASA. \par - Hold Hydrea for now\par - Will continue to monitor CBC\par \par RTC in 3 weeks\par \par \par \par \par \par \par

## 2021-07-27 ENCOUNTER — APPOINTMENT (OUTPATIENT)
Dept: INFUSION THERAPY | Facility: CLINIC | Age: 76
End: 2021-07-27

## 2021-07-27 ENCOUNTER — LABORATORY RESULT (OUTPATIENT)
Age: 76
End: 2021-07-27

## 2021-07-27 LAB
HCT VFR BLD CALC: 19 %
HGB BLD-MCNC: 6.1 G/DL
MCHC RBC-ENTMCNC: 32.1 G/DL
MCHC RBC-ENTMCNC: 32.3 PG
MCV RBC AUTO: 100.5 FL
PLATELET # BLD AUTO: 589 K/UL
PMV BLD: 10.3 FL
RBC # BLD: 1.89 M/UL
RBC # FLD: 21.6 %
WBC # FLD AUTO: 7.63 K/UL

## 2021-07-27 RX ORDER — ERYTHROPOIETIN 10000 [IU]/ML
80000 INJECTION, SOLUTION INTRAVENOUS; SUBCUTANEOUS ONCE
Refills: 0 | Status: COMPLETED | OUTPATIENT
Start: 2021-07-27 | End: 2021-07-27

## 2021-07-27 RX ADMIN — ERYTHROPOIETIN 80000 UNIT(S): 10000 INJECTION, SOLUTION INTRAVENOUS; SUBCUTANEOUS at 14:19

## 2021-07-28 LAB
ABO + RH PNL BLD: NORMAL
ALBUMIN SERPL ELPH-MCNC: 4.2 G/DL
ALP BLD-CCNC: 84 U/L
ALT SERPL-CCNC: 8 U/L
ANION GAP SERPL CALC-SCNC: 20 MMOL/L
AST SERPL-CCNC: 11 U/L
BILIRUB SERPL-MCNC: 0.8 MG/DL
BLD GP AB SCN SERPL QL: NORMAL
BUN SERPL-MCNC: 21 MG/DL
CALCIUM SERPL-MCNC: 8.7 MG/DL
CHLORIDE SERPL-SCNC: 99 MMOL/L
CO2 SERPL-SCNC: 16 MMOL/L
CREAT SERPL-MCNC: 1.6 MG/DL
GLUCOSE SERPL-MCNC: 485 MG/DL
POTASSIUM SERPL-SCNC: 5.5 MMOL/L
PROT SERPL-MCNC: 5.7 G/DL
SODIUM SERPL-SCNC: 135 MMOL/L

## 2021-07-29 ENCOUNTER — APPOINTMENT (OUTPATIENT)
Dept: INFUSION THERAPY | Facility: CLINIC | Age: 76
End: 2021-07-29

## 2021-07-29 LAB
ANION GAP SERPL CALC-SCNC: 18 MMOL/L
BUN SERPL-MCNC: 25 MG/DL
CALCIUM SERPL-MCNC: 8.7 MG/DL
CHLORIDE SERPL-SCNC: 96 MMOL/L
CO2 SERPL-SCNC: 16 MMOL/L
CREAT SERPL-MCNC: 1.5 MG/DL
GLUCOSE SERPL-MCNC: 425 MG/DL
POTASSIUM SERPL-SCNC: 5.4 MMOL/L
SODIUM SERPL-SCNC: 130 MMOL/L

## 2021-08-03 ENCOUNTER — APPOINTMENT (OUTPATIENT)
Dept: INFUSION THERAPY | Facility: CLINIC | Age: 76
End: 2021-08-03

## 2021-08-03 ENCOUNTER — LABORATORY RESULT (OUTPATIENT)
Age: 76
End: 2021-08-03

## 2021-08-03 LAB
HCT VFR BLD CALC: 26.7 %
HGB BLD-MCNC: 9 G/DL
MCHC RBC-ENTMCNC: 32.4 PG
MCHC RBC-ENTMCNC: 33.7 G/DL
MCV RBC AUTO: 96 FL
PLATELET # BLD AUTO: 585 K/UL
PMV BLD: 11 FL
RBC # BLD: 2.78 M/UL
RBC # FLD: 19.2 %
WBC # FLD AUTO: 9.84 K/UL

## 2021-08-03 RX ORDER — ERYTHROPOIETIN 10000 [IU]/ML
80000 INJECTION, SOLUTION INTRAVENOUS; SUBCUTANEOUS ONCE
Refills: 0 | Status: COMPLETED | OUTPATIENT
Start: 2021-08-03 | End: 2021-08-03

## 2021-08-03 RX ADMIN — ERYTHROPOIETIN 80000 UNIT(S): 10000 INJECTION, SOLUTION INTRAVENOUS; SUBCUTANEOUS at 12:49

## 2021-08-10 ENCOUNTER — APPOINTMENT (OUTPATIENT)
Dept: HEMATOLOGY ONCOLOGY | Facility: CLINIC | Age: 76
End: 2021-08-10
Payer: MEDICARE

## 2021-08-10 ENCOUNTER — LABORATORY RESULT (OUTPATIENT)
Age: 76
End: 2021-08-10

## 2021-08-10 VITALS
HEIGHT: 62 IN | WEIGHT: 143 LBS | BODY MASS INDEX: 26.31 KG/M2 | SYSTOLIC BLOOD PRESSURE: 127 MMHG | DIASTOLIC BLOOD PRESSURE: 56 MMHG | TEMPERATURE: 96.8 F | HEART RATE: 112 BPM

## 2021-08-10 PROCEDURE — 99214 OFFICE O/P EST MOD 30 MIN: CPT

## 2021-08-10 RX ORDER — HYDROXYUREA 500 MG/1
500 CAPSULE ORAL
Qty: 120 | Refills: 0 | Status: DISCONTINUED | COMMUNITY
Start: 2020-08-17 | End: 2021-08-10

## 2021-08-13 ENCOUNTER — APPOINTMENT (OUTPATIENT)
Dept: INFUSION THERAPY | Facility: CLINIC | Age: 76
End: 2021-08-13

## 2021-08-13 ENCOUNTER — LABORATORY RESULT (OUTPATIENT)
Age: 76
End: 2021-08-13

## 2021-08-13 RX ORDER — ERYTHROPOIETIN 10000 [IU]/ML
80000 INJECTION, SOLUTION INTRAVENOUS; SUBCUTANEOUS ONCE
Refills: 0 | Status: COMPLETED | OUTPATIENT
Start: 2021-08-13 | End: 2021-08-13

## 2021-08-13 RX ORDER — PREGABALIN 225 MG/1
1000 CAPSULE ORAL ONCE
Refills: 0 | Status: COMPLETED | OUTPATIENT
Start: 2021-08-13 | End: 2021-08-13

## 2021-08-13 RX ORDER — LUSPATERCEPT 75 MG/1
110 INJECTION, POWDER, LYOPHILIZED, FOR SOLUTION SUBCUTANEOUS ONCE
Refills: 0 | Status: COMPLETED | OUTPATIENT
Start: 2021-08-13 | End: 2021-08-13

## 2021-08-13 RX ADMIN — ERYTHROPOIETIN 80000 UNIT(S): 10000 INJECTION, SOLUTION INTRAVENOUS; SUBCUTANEOUS at 13:54

## 2021-08-13 RX ADMIN — PREGABALIN 1000 MICROGRAM(S): 225 CAPSULE ORAL at 13:55

## 2021-08-13 RX ADMIN — LUSPATERCEPT 110 MILLIGRAM(S): 75 INJECTION, POWDER, LYOPHILIZED, FOR SOLUTION SUBCUTANEOUS at 13:55

## 2021-08-16 ENCOUNTER — APPOINTMENT (OUTPATIENT)
Dept: HEMATOLOGY ONCOLOGY | Facility: CLINIC | Age: 76
End: 2021-08-16

## 2021-08-16 NOTE — HISTORY OF PRESENT ILLNESS
[de-identified] : She missed on appointment for procrit last week.\par She starts her next cycle on 6/25/18\par C/o back pain radiating down her left which occurred when she bent to  something.\par No change in diarrhea.\par \par 7/31/18:\par Doing well. On Revlimid for more than 2yrs, 5 mg 2 weeks on 1 week off. She will be starting week on tonight. \par C/o diarrhea. On Imodium 2 pills AM and 2 pills PM. Doesn't help much with diarrhea. \par C/o nausea, abdominal pain. \par \par \par Colonoscopy in 2016 was normal. \par Did not have blood transfusion for over 2 years. \par On procrit [id number] weekly. Missed last week on 7/24/18 as she was out of town. She has been non compliant with weekly Procrit.\par Mammogram in 2017 was normal. \par \par 9/11/18\par pt is here for follow up.\par She feels the lomotil helps with the diarrhea.\par She was away for a few weeks and missed her procrit injections.\par No fever, abdominal  discomfort has been less since since use of lomotil.\par She started a new cycle 2 days ago.\par \par 10/2/18:\par She will start Revlimid tonight (week on). 2 weeks on, 1 week off. \par On ASA 81mg. \par Denies fever, nausea, vomiting, chest pain, SOB, abdominal pain, and bladder problems.\par C/o diarrhea. \par S/p 2 units PRBC transfusion on 9/25/18. \par On Procrit 09976 units weekly. Not compliant every week. \par C/o intermittent dizziness. \par \par 10/31/18\par Pt is here for follow up.\par C/O significant fatigue.\par Continues to have diarrhea, takes imodium and lomotil as needed.\par C/o dry cough, no fever.\par Cough started a month ago. It is worse in the mornings however over last week it has been constant all day.\par No chest pain.\par She was found to have low B12 levels and was started on B12 injections.\par \par 11/27/18:\par Doing well. Hospitalized for 3 days for cellulitis/abcess of the back s/p drainage and on Abx currently. Developed 3 days after Mg infusion as per pt. \par Denies nausea, vomiting, chest pain, SOB, abdominal pain, bowel and bladder problems.\par This is the week on Revlimid (week 1).\par S/p 1 unit PRBC transfusion in the hospital. \par On Procrit weekly \par \par 12/27/18:\par Doing well. No major complaints.\par Denies fever, nausea, vomiting, chest pain, SOB, abdominal pain, and bladder problems.\par On Revlimid 5 mg 2 weeks on 1 week off. This is the week off. She will start the week on sunday (12/30/18).\par Due for Procrit 96334 units today. \par Still has diarrhea. 4-5bm/day.\par On monthly B12 injections. \par \par 1/30/19:\par Patient here for follow up for MDS.  She is taking Revlimid 5 mg, 2 weeks on, 1 week off.  She will complete this current cycle on Saturday.  She has no new complaints today.  Patient denies cough, shortness of breath, denies fever, denies bone pain.\par \par 03/04/2019\par Patient is here for a follow up visit. She complaints of severe pain in her left shoulder and has had abscess drained 2 weeks ago. However the pain is worse and she has purulent discharge on examination. She also has Nasal sores that are painful. Culture from 11/2018 showed MRSA.  Denies Fever. \par She also complaints of swelling in her right leg. Not tender and not erythematous and negative Gong;s sign. \par Her diarrhea with Revlimid is ongoing and Imodium and Lomotil helps but not everyday. \par She is on 60,000 units of procrit weekly and Revlimid 5 mg 2 weeks on 1 week off. \par \par 4/23/19\par Pt is here for follow up.\par She feels well.\par The nasal sores have improved with bactroban\par The abscess on left shoulder has resolved.\par However she has a new one on the middle of her back.\par No fever.\par Pt has been on procrit 13908 units weekly, however she missed a dose in between.\par \par 5/8/19\par pt is here for follow up.\par no new complaints.\par feels well.\par Her skin abscess has resolved.\par she has been unable to go to ID, as she could not get an appt.\par No bleeding.\par She is compliant with revlimid.\par \par 6/6/19\par Pt is here for follow up.\par no new complaints \par Feels well.\par Is on week 2 of her Revlimid cycle. \par No transfusions since last visit\par \par 7/17/19\par Pt is here for follow up.\par C/O fatigue.\par Was away for a few days two weeks ago, but missed treatment with procrit for 2 weeks.\par Is complaint with Revlimid 2 weeks on 1 week off.\par Diarrhea is a little improved.\par \par \par 8/14/19\par Patient here for follow up visit, feeling well.  Although she is complaining of some pain at the left scapula area recently.  Patient denies cough, shortness of breath, denies fever, denies other bone pain.  She is off Revlimid  this week, due to restart on Monday.  She is scheduled for Procrit and Vitamin B12 injection today.  She plans to leave the country tomorrow to visit her mother who is ill.  She will return in about 10 days.\par \par 9/11/19\par Pt is here for follow up.\par She was away for 3 weeks, out of which she took procrit for 2 weeks in [place].\par She missed last week's dose.\par She had CBCs in [place] which showed Hgb of 8.9\par She feels well.\par She still getting diarrhea.\par She has been using lomotil with very minimal relief.\par She started a new cycle of Revlimid 4 days ago.\par \par 10/3/19\par Patient here for follow up visit, feeling fairly well.   Patient denies cough, shortness of breath, denies fever, denies other bone pain.  She remains on Revlimid.  She is scheduled for Procrit and Vitamin B12 injection today.\par \par 10/24/19\par Pt is here for follow up.\par Feels well.\par No SOB, fatigue.\par Compliant with procrit and Revl;imid.\par Remains on ASa.\par Diarrhea is unchanged.\par Pt takes imodium as needed.\par \par 11/14/19\par Pt is here for follow up.\par No new complaints.\par Starts a new cycle of Revlimid today.\par Diarrhea is same as before, no rectal bleeding.\par No fever.\par \par 12/5/19\par Pt is here for follow up.\par No new complaints.\par Denies feeling weaker than usual.\par No fever, SOB.\par No change in frequency of diarrhea.\par No pain.\par Will start next cycle of Revlimid in 3 days.\par \par \par !/16/20\par Pt was recently DCed from [facility] 3 days ago after being treated for pneumonia and MARCO.\par She is on po Levaquin.\par She restarted Revlmid 3 days ago.\par She is feeling better now. No fever.\par No SOB, Chest pain and back pain has resolved.\par Pt has a cough, no expectoration.\par She also recd a unit of PRBCs last week in the hospital\par \par \par 1/30/20\par Patient here for follow up visit, feeling well.  She has no new complaints. Cough is almost gone completely.  Patient denies shortness of breath, denies fever, denies bone pain.  Her appetite is ok, weight is stable.  She is due to restart Revlimid on Monday.\par \par 02/20/20\par Pt is here for follow up, feeling well.\par Offers no new complaints. Denies SOB, fever, chills, weight loss, CP, cough. \par Currently off week of Revlimid 5 mg. Restarts on Monday.\par Has procrit 80,000 units today. Next b 12 injection due in 2 weeks \par Did not get chest Xray\par CBC reviewed WBC 3.1, h/h 8.3/25%, plt 386, neutrophils 1.29\par \par 3/12/20\par Pt is here for follow up.\par She took Revlimid for 2 weeks and then has been off for one week although she was advised to take it daily without break.\par No SOB, Cough, fever.\par Has mild fatigue.\par \par 04/02/2020\par SIMONA KUHN a 75 year F is here today for follow up visit of MDS.\par Denies fever, chills, cough,night sweats, weight loss. \par Denies bleeding or bruising.\par Pt receives procrit 80,000 units weekly and B12 IM monthly. \par Continued Revlimid 5 mg daily x 3 weeks, has 1 dose left tonight. \par CBC reviewed: WBC 3.2, H/H 8.1/25.2, neutrophils 1.6, PLT 72\par \par 4/20/2020: The patient is here for follow up . She has no complaints of fever, chills, dizziness , chest pain and shortness of breath . She is still with diarrhea , up to 10 watery BMs per day, responds poorly to imodium and lomotil. She is on Revlimid 5 mg daily , took last dose yesterday night. Her last Procrit was on April 13. \par \par 5/26/20\par Pt is here for follow up.\par She is feeling well. Denies SOB, chest pain, fever.\par Continues to have diarrhea although she is off of Revlmid.\par Thinks it may due to diabetic meds.\par Wants to discuss BM bx results\par \par 6/22/20\par Pt is here for follow up.\par Feels well. Denies any fever, N, V, D\par Continues to have diarrhea, she will talk to her PCP about changing metformin for DM.\par Has been needing PRBCs 1 unit each month\par \par 7/20/20\par Pt is here for follow up.\par No new complaints. Has been feeling well.\par No SOB, CP. \par No N, V, D.\par She has recd 2 units of PRBCs since May 20.\par \par 8/17/20\par Pt is here for a follow up visit.\par She is feeling well.\par No new symptoms. No N, V, D.\par No bruising or bleeding. She continues to need PRBCs every 2-3 weeks.\par \par 8/31/20\par Pt is here for follow up. She is feeling well. No N, V, D.\par She is tolerating the hydrea well. No SOB, CP\par No bruising ior bleeding\par \par 9/8/20\par Pt is here for follow up.\par She had been contacted by the [facility] last week to advise her to stop the hydrea. Pt did not check her messages and continued with Hydrea.\par No fever, N, V, bleeding.\par Saw Dr Ferrell last week.\par \par 9/14/20\par Pt is here for folow up.\par Besides her usual complaint of diarrhea she is feeling well.\par Recd 1 unit PRBC last week.\par Has stopped Hydrea.\par No fever, mouth sores.\par \par 9/29/20\par Pt is here for folow up.\par No new complaints.\par Is seeing GI for diarrhea net week.\par No fever, night sweats.\par REcd 3 units of PRBC this month\par \par 10/13/20\par Pt is here for follow up.\par No new complaints.\par Has not needed a transfusion since 3 weeks ago.\par Continues to have diarrhea, waiting to be seen by GI\par \par 10/26/20\par Pt is here for follow up.\par C/O feeling fatigued.\par Has CLARK.\par No nausea or vomiting.\par No fever.\par Diarrhea has improved a little. She is no longer on Metformin\par \par 11/9/20\par Pt is here for follow up.\par Feels well. Denies SOB, CP, fatigue\par \par \par 12/7/20- Patient is for follow up and is due for cycle 9 of Vidaza.  She still has loose BM sometimes 10 times a day and was seeing Dr. Corey from GI- did not follow up recently and is unable to get an appt with him. She has lost about 10 lbs since September. No N/V. No fever or chills. No CP/SOB. She has been getting PRBC transfusion every 3 weeks now\par \par 01/04/20 Pt presented today for f/u visit . She is s/p 8 cycles of vidaza and was started on Luspatercept in Dec , 2020 . So far she received 1 injection , she is due for 2nd injection today . Adverse effect profile was discussed with her in detail , she reports having some dizziness and weakness after first injection . She received blood transfusion last wk . Blood work from today reviewed as well and counts are adequate . She denies any fevers,  chills,  or any new symptoms . \par \par 1/25/21\par Pt is here for follow up.\par C/O diarrhea, otherwise feeling better.\par Has not needed a transfusion since 12/29\par \par 2/16/21\par Pt is here for follow up.\par Needed transfusion on 2/8/21.\par Continues to C/o fatigue. Diarrhea remains the same, has not made GI appt as yet.\par \par 3/8/21\par Pt is here for follow up.\par Feels better today. Recd 1 unit PRBCs last week.\par Diarrhea is same as before. has an appt with GI next week.\par No fever\par \par 3/30/21\par Pt is here for follow up.\par Feels better. Last transfusion was on 3/2/21\par Saw GI and was started on cholestyramine and metformin was DCED with resolution of diarrhea.\par She denies any SOB,\par Fatigue is better\par \par 4/20/2021\par Pt is here to f/u for MDS\par She is s/p Luspatercept cycle #6\par She is compliant with Aspirin\par She feels better today, appetite is good\par She denies shortness of breath, fatigue, or weakness \par She c/o of diarrhea but it has improved since she was started on Cholestyramine. Stool is soft and less in frequency\par She received COVID vaccine x 2 doses\par \par 5/11/21\par pt is here for follow up.\par Feels the same with fatigue, diarrhea.\par No SOB\par \par 6/21/21\par Pt is here for follow up.\par Feels well. No SOB, CP, N< V.\par Diarrhea is better controlled now.\par Last PRBC transfusion was 2 weeks ago.\par \par 7/19/21\par Pt is here for follow up.\par Feels a little tired, last transfusion was 6/1/21.\par No bleeding, fever, SOB\par \par 8/10/2021\par Patient is here to follow up for her MDS.\par She is on Luspatercept, tolerating well.\par She feels well today, the appetite is good.\par She denies shortness of breath, fatigue, fever, night sweats, abdominal pain, arthralgias/myalgias. [de-identified] : This is a 75 year old Black female with history of MDS. She's been on treatment with Revlimid 5 mg, 2 weeks on, 1 week off.  \par She is also on Procrit.\par  She underwent colonoscopy (2/2017)  Results noted no colitis, only hyperplastic polyp noted. \par  \par She has C/O abdominal pain and passage of mucus per rectum.\par She is planning to follow with her GI.\par Other than that she feels well.\par She is compliant with Revlimid .

## 2021-08-16 NOTE — CONSULT LETTER
[DrBakari  ___] : Dr. VOGT [Dear  ___] : Dear ~GONZALES, [Courtesy Letter:] : I had the pleasure of seeing your patient, [unfilled], in my office today. [Please see my note below.] : Please see my note below. [Consult Closing:] : Thank you very much for allowing me to participate in the care of this patient.  If you have any questions, please do not hesitate to contact me. [Sincerely,] : Sincerely, [FreeTextEntry2] : Dr. David Torres [FreeTextEntry3] : Dr. HUGO Montano

## 2021-08-16 NOTE — ASSESSMENT
[FreeTextEntry1] : # Lowrisk myelodysplastic syndrome, previously treated with  Revlimid and Procrit, now on Vidaza . 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 \par Patient is s/p  9 cycles of Vidaza and she was requiring pRBC transfusion every 3 weeks \par Given the persistent transfusion dependence she was switched to Luspatercept.\par \par Hyperkalemia likely due to increased platelet; pseudohyperkalemia.\par CBC today showed persistent elevated platelets 662 K/uL\par \par PLAN:\par - Continue  Luspatercept @ 1.75 mg/kg cycle #11\par Adverse effect profile was discussed with her in detail and she was told that they include but are not limited to Hypertension, abdominal pain, transaminitis, fatigue, weakness, arthralgias and muscle aches .\par \par - Continue with Procrit 80,000 units every week\par Monitor CBC weekly and administer transfusions as needed if Hgb less than 7 g/dL\par \par # Vitamin  B 12 deficiency\par - Continue Vitamin B 12 injection every month\par \par # Thrombocytosis\par - Continue ASA \par - Will re-start Hydrea 500 mg twice a day -eRx sent\par - Will continue to monitor CBC and adjust the dose of Hydrea accordingly.\par \par RTC in 3 weeks\par All her concerns were addressed during the visit.\par \par Case was seen and discussed with Dr. Montano who agreed with assessment and plan.\par \par \par \par \par \par \par \par

## 2021-08-16 NOTE — PHYSICAL EXAM
[Restricted in physically strenuous activity but ambulatory and able to carry out work of a light or sedentary nature] : Status 1- Restricted in physically strenuous activity but ambulatory and able to carry out work of a light or sedentary nature, e.g., light house work, office work [Normal] : PERRL, EOMI, no conjunctival infection, anicteric [de-identified] : Mild arthritic changes.

## 2021-08-16 NOTE — REVIEW OF SYSTEMS
[Negative] : Allergic/Immunologic [Diarrhea] : diarrhea [Recent Change In Weight] : ~T no recent weight change [Shortness Of Breath] : no shortness of breath

## 2021-08-17 LAB
HCT VFR BLD CALC: 22.7 %
HGB BLD-MCNC: 7.5 G/DL
MCHC RBC-ENTMCNC: 31.9 PG
MCHC RBC-ENTMCNC: 33 G/DL
MCV RBC AUTO: 96.6 FL
PLATELET # BLD AUTO: 574 K/UL
PMV BLD: 10.7 FL
RBC # BLD: 2.35 M/UL
RBC # FLD: 19.7 %
WBC # FLD AUTO: 9.12 K/UL

## 2021-08-20 ENCOUNTER — APPOINTMENT (OUTPATIENT)
Dept: INFUSION THERAPY | Facility: CLINIC | Age: 76
End: 2021-08-20

## 2021-08-23 ENCOUNTER — LABORATORY RESULT (OUTPATIENT)
Age: 76
End: 2021-08-23

## 2021-08-23 ENCOUNTER — APPOINTMENT (OUTPATIENT)
Dept: INFUSION THERAPY | Facility: CLINIC | Age: 76
End: 2021-08-23

## 2021-08-23 RX ORDER — ERYTHROPOIETIN 10000 [IU]/ML
80000 INJECTION, SOLUTION INTRAVENOUS; SUBCUTANEOUS ONCE
Refills: 0 | Status: COMPLETED | OUTPATIENT
Start: 2021-08-23 | End: 2021-08-23

## 2021-08-23 RX ADMIN — ERYTHROPOIETIN 80000 UNIT(S): 10000 INJECTION, SOLUTION INTRAVENOUS; SUBCUTANEOUS at 13:40

## 2021-08-24 LAB
HCT VFR BLD CALC: 21 %
HGB BLD-MCNC: 7 G/DL
MCHC RBC-ENTMCNC: 32.7 PG
MCHC RBC-ENTMCNC: 33.3 G/DL
MCV RBC AUTO: 98.1 FL
PLATELET # BLD AUTO: 604 K/UL
PMV BLD: 11.3 FL
RBC # BLD: 2.14 M/UL
RBC # FLD: 20.7 %
WBC # FLD AUTO: 10.4 K/UL

## 2021-08-27 ENCOUNTER — APPOINTMENT (OUTPATIENT)
Dept: INFUSION THERAPY | Facility: CLINIC | Age: 76
End: 2021-08-27

## 2021-08-30 ENCOUNTER — APPOINTMENT (OUTPATIENT)
Dept: INFUSION THERAPY | Facility: CLINIC | Age: 76
End: 2021-08-30

## 2021-08-30 ENCOUNTER — APPOINTMENT (OUTPATIENT)
Dept: HEMATOLOGY ONCOLOGY | Facility: CLINIC | Age: 76
End: 2021-08-30

## 2021-08-31 ENCOUNTER — LABORATORY RESULT (OUTPATIENT)
Age: 76
End: 2021-08-31

## 2021-08-31 ENCOUNTER — APPOINTMENT (OUTPATIENT)
Dept: INFUSION THERAPY | Facility: CLINIC | Age: 76
End: 2021-08-31

## 2021-08-31 ENCOUNTER — APPOINTMENT (OUTPATIENT)
Dept: HEMATOLOGY ONCOLOGY | Facility: CLINIC | Age: 76
End: 2021-08-31
Payer: MEDICARE

## 2021-08-31 VITALS
TEMPERATURE: 98 F | HEIGHT: 62 IN | WEIGHT: 138 LBS | BODY MASS INDEX: 25.4 KG/M2 | HEART RATE: 121 BPM | DIASTOLIC BLOOD PRESSURE: 59 MMHG | SYSTOLIC BLOOD PRESSURE: 132 MMHG

## 2021-08-31 LAB
HCT VFR BLD CALC: 19.4 %
HGB BLD-MCNC: 6.3 G/DL
MCHC RBC-ENTMCNC: 32.5 G/DL
MCHC RBC-ENTMCNC: 32.5 PG
MCV RBC AUTO: 100 FL
PLATELET # BLD AUTO: 692 K/UL
PMV BLD: 10.5 FL
RBC # BLD: 1.94 M/UL
RBC # FLD: 22.2 %
WBC # FLD AUTO: 10.31 K/UL

## 2021-08-31 PROCEDURE — 99215 OFFICE O/P EST HI 40 MIN: CPT

## 2021-08-31 RX ORDER — ERYTHROPOIETIN 10000 [IU]/ML
80000 INJECTION, SOLUTION INTRAVENOUS; SUBCUTANEOUS ONCE
Refills: 0 | Status: COMPLETED | OUTPATIENT
Start: 2021-08-31 | End: 2021-08-31

## 2021-08-31 RX ORDER — LUSPATERCEPT 75 MG/1
110 INJECTION, POWDER, LYOPHILIZED, FOR SOLUTION SUBCUTANEOUS ONCE
Refills: 0 | Status: COMPLETED | OUTPATIENT
Start: 2021-08-31 | End: 2021-08-31

## 2021-08-31 RX ADMIN — LUSPATERCEPT 110 MILLIGRAM(S): 75 INJECTION, POWDER, LYOPHILIZED, FOR SOLUTION SUBCUTANEOUS at 16:29

## 2021-08-31 RX ADMIN — ERYTHROPOIETIN 80000 UNIT(S): 10000 INJECTION, SOLUTION INTRAVENOUS; SUBCUTANEOUS at 16:29

## 2021-09-01 ENCOUNTER — APPOINTMENT (OUTPATIENT)
Dept: INFUSION THERAPY | Facility: CLINIC | Age: 76
End: 2021-09-01

## 2021-09-01 LAB
ABO + RH PNL BLD: NORMAL
BLD GP AB SCN SERPL QL: NORMAL

## 2021-09-07 ENCOUNTER — APPOINTMENT (OUTPATIENT)
Dept: INFUSION THERAPY | Facility: CLINIC | Age: 76
End: 2021-09-07

## 2021-09-07 ENCOUNTER — LABORATORY RESULT (OUTPATIENT)
Age: 76
End: 2021-09-07

## 2021-09-07 LAB
HCT VFR BLD CALC: 23.7 %
HGB BLD-MCNC: 7.8 G/DL
MCHC RBC-ENTMCNC: 30.8 PG
MCHC RBC-ENTMCNC: 32.9 G/DL
MCV RBC AUTO: 93.7 FL
PLATELET # BLD AUTO: 664 K/UL
PMV BLD: 10.7 FL
RBC # BLD: 2.53 M/UL
RBC # FLD: 21.2 %
WBC # FLD AUTO: 8.48 K/UL

## 2021-09-07 RX ORDER — ERYTHROPOIETIN 10000 [IU]/ML
80000 INJECTION, SOLUTION INTRAVENOUS; SUBCUTANEOUS ONCE
Refills: 0 | Status: COMPLETED | OUTPATIENT
Start: 2021-09-07 | End: 2021-09-07

## 2021-09-07 RX ADMIN — ERYTHROPOIETIN 80000 UNIT(S): 10000 INJECTION, SOLUTION INTRAVENOUS; SUBCUTANEOUS at 14:59

## 2021-09-10 NOTE — PHYSICAL EXAM
[Restricted in physically strenuous activity but ambulatory and able to carry out work of a light or sedentary nature] : Status 1- Restricted in physically strenuous activity but ambulatory and able to carry out work of a light or sedentary nature, e.g., light house work, office work [Normal] : affect appropriate [de-identified] : Mild arthritic changes.

## 2021-09-10 NOTE — ASSESSMENT
[FreeTextEntry1] : # Lowrisk myelodysplastic syndrome, previously treated with  Revlimid and Procrit, now on Vidaza . 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 \par Patient is s/p  9 cycles of Vidaza and she was requiring pRBC transfusion every 3 weeks \par Given the persistent transfusion dependence she was switched to Luspatercept.\par \par Hyperkalemia likely due to increased platelet; pseudohyperkalemia.\par CBC today showed persistent elevated platelets 662 K/uL\par \par PLAN:\par - Continue  Luspatercept @ 1.75 mg/kg cycle #12\par Adverse effect profile was discussed with her in detail and she was told that they include but are not limited to Hypertension, abdominal pain, transaminitis, fatigue, weakness, arthralgias and muscle aches .\par \par - Continue with Procrit 80,000 units every week\par Monitor CBC weekly and administer transfusions as needed,\par Pt advised to get 1 unit PRBC today.\par \par # Vitamin  B 12 deficiency\par - Continue Vitamin B 12 injection every month\par \par # Thrombocytosis\par - Continue ASA \par - pt on t Hydrea 500 mg twice a day -will reassess as it may be contributing to worsening of anemia\par - Will continue to monitor CBC and adjust the dose of Hydrea accordingly.\par \par RTC in 3 weeks\par All her concerns were addressed during the visit.\par \par \par \par \par \par \par \par \par

## 2021-09-10 NOTE — HISTORY OF PRESENT ILLNESS
[de-identified] : She missed on appointment for procrit last week.\par She starts her next cycle on 6/25/18\par C/o back pain radiating down her left which occurred when she bent to  something.\par No change in diarrhea.\par \par 7/31/18:\par Doing well. On Revlimid for more than 2yrs, 5 mg 2 weeks on 1 week off. She will be starting week on tonight. \par C/o diarrhea. On Imodium 2 pills AM and 2 pills PM. Doesn't help much with diarrhea. \par C/o nausea, abdominal pain. \par \par \par Colonoscopy in 2016 was normal. \par Did not have blood transfusion for over 2 years. \par On procrit [id number] weekly. Missed last week on 7/24/18 as she was out of town. She has been non compliant with weekly Procrit.\par Mammogram in 2017 was normal. \par \par 9/11/18\par pt is here for follow up.\par She feels the lomotil helps with the diarrhea.\par She was away for a few weeks and missed her procrit injections.\par No fever, abdominal  discomfort has been less since since use of lomotil.\par She started a new cycle 2 days ago.\par \par 10/2/18:\par She will start Revlimid tonight (week on). 2 weeks on, 1 week off. \par On ASA 81mg. \par Denies fever, nausea, vomiting, chest pain, SOB, abdominal pain, and bladder problems.\par C/o diarrhea. \par S/p 2 units PRBC transfusion on 9/25/18. \par On Procrit 72290 units weekly. Not compliant every week. \par C/o intermittent dizziness. \par \par 10/31/18\par Pt is here for follow up.\par C/O significant fatigue.\par Continues to have diarrhea, takes imodium and lomotil as needed.\par C/o dry cough, no fever.\par Cough started a month ago. It is worse in the mornings however over last week it has been constant all day.\par No chest pain.\par She was found to have low B12 levels and was started on B12 injections.\par \par 11/27/18:\par Doing well. Hospitalized for 3 days for cellulitis/abcess of the back s/p drainage and on Abx currently. Developed 3 days after Mg infusion as per pt. \par Denies nausea, vomiting, chest pain, SOB, abdominal pain, bowel and bladder problems.\par This is the week on Revlimid (week 1).\par S/p 1 unit PRBC transfusion in the hospital. \par On Procrit weekly \par \par 12/27/18:\par Doing well. No major complaints.\par Denies fever, nausea, vomiting, chest pain, SOB, abdominal pain, and bladder problems.\par On Revlimid 5 mg 2 weeks on 1 week off. This is the week off. She will start the week on sunday (12/30/18).\par Due for Procrit 34870 units today. \par Still has diarrhea. 4-5bm/day.\par On monthly B12 injections. \par \par 1/30/19:\par Patient here for follow up for MDS.  She is taking Revlimid 5 mg, 2 weeks on, 1 week off.  She will complete this current cycle on Saturday.  She has no new complaints today.  Patient denies cough, shortness of breath, denies fever, denies bone pain.\par \par 03/04/2019\par Patient is here for a follow up visit. She complaints of severe pain in her left shoulder and has had abscess drained 2 weeks ago. However the pain is worse and she has purulent discharge on examination. She also has Nasal sores that are painful. Culture from 11/2018 showed MRSA.  Denies Fever. \par She also complaints of swelling in her right leg. Not tender and not erythematous and negative Gong;s sign. \par Her diarrhea with Revlimid is ongoing and Imodium and Lomotil helps but not everyday. \par She is on 60,000 units of procrit weekly and Revlimid 5 mg 2 weeks on 1 week off. \par \par 4/23/19\par Pt is here for follow up.\par She feels well.\par The nasal sores have improved with bactroban\par The abscess on left shoulder has resolved.\par However she has a new one on the middle of her back.\par No fever.\par Pt has been on procrit 73447 units weekly, however she missed a dose in between.\par \par 5/8/19\par pt is here for follow up.\par no new complaints.\par feels well.\par Her skin abscess has resolved.\par she has been unable to go to ID, as she could not get an appt.\par No bleeding.\par She is compliant with revlimid.\par \par 6/6/19\par Pt is here for follow up.\par no new complaints \par Feels well.\par Is on week 2 of her Revlimid cycle. \par No transfusions since last visit\par \par 7/17/19\par Pt is here for follow up.\par C/O fatigue.\par Was away for a few days two weeks ago, but missed treatment with procrit for 2 weeks.\par Is complaint with Revlimid 2 weeks on 1 week off.\par Diarrhea is a little improved.\par \par \par 8/14/19\par Patient here for follow up visit, feeling well.  Although she is complaining of some pain at the left scapula area recently.  Patient denies cough, shortness of breath, denies fever, denies other bone pain.  She is off Revlimid  this week, due to restart on Monday.  She is scheduled for Procrit and Vitamin B12 injection today.  She plans to leave the country tomorrow to visit her mother who is ill.  She will return in about 10 days.\par \par 9/11/19\par Pt is here for follow up.\par She was away for 3 weeks, out of which she took procrit for 2 weeks in [place].\par She missed last week's dose.\par She had CBCs in [place] which showed Hgb of 8.9\par She feels well.\par She still getting diarrhea.\par She has been using lomotil with very minimal relief.\par She started a new cycle of Revlimid 4 days ago.\par \par 10/3/19\par Patient here for follow up visit, feeling fairly well.   Patient denies cough, shortness of breath, denies fever, denies other bone pain.  She remains on Revlimid.  She is scheduled for Procrit and Vitamin B12 injection today.\par \par 10/24/19\par Pt is here for follow up.\par Feels well.\par No SOB, fatigue.\par Compliant with procrit and Revl;imid.\par Remains on ASa.\par Diarrhea is unchanged.\par Pt takes imodium as needed.\par \par 11/14/19\par Pt is here for follow up.\par No new complaints.\par Starts a new cycle of Revlimid today.\par Diarrhea is same as before, no rectal bleeding.\par No fever.\par \par 12/5/19\par Pt is here for follow up.\par No new complaints.\par Denies feeling weaker than usual.\par No fever, SOB.\par No change in frequency of diarrhea.\par No pain.\par Will start next cycle of Revlimid in 3 days.\par \par \par !/16/20\par Pt was recently DCed from [facility] 3 days ago after being treated for pneumonia and MARCO.\par She is on po Levaquin.\par She restarted Revlmid 3 days ago.\par She is feeling better now. No fever.\par No SOB, Chest pain and back pain has resolved.\par Pt has a cough, no expectoration.\par She also recd a unit of PRBCs last week in the hospital\par \par \par 1/30/20\par Patient here for follow up visit, feeling well.  She has no new complaints. Cough is almost gone completely.  Patient denies shortness of breath, denies fever, denies bone pain.  Her appetite is ok, weight is stable.  She is due to restart Revlimid on Monday.\par \par 02/20/20\par Pt is here for follow up, feeling well.\par Offers no new complaints. Denies SOB, fever, chills, weight loss, CP, cough. \par Currently off week of Revlimid 5 mg. Restarts on Monday.\par Has procrit 80,000 units today. Next b 12 injection due in 2 weeks \par Did not get chest Xray\par CBC reviewed WBC 3.1, h/h 8.3/25%, plt 386, neutrophils 1.29\par \par 3/12/20\par Pt is here for follow up.\par She took Revlimid for 2 weeks and then has been off for one week although she was advised to take it daily without break.\par No SOB, Cough, fever.\par Has mild fatigue.\par \par 04/02/2020\par SIMONA KUHN a 75 year F is here today for follow up visit of MDS.\par Denies fever, chills, cough,night sweats, weight loss. \par Denies bleeding or bruising.\par Pt receives procrit 80,000 units weekly and B12 IM monthly. \par Continued Revlimid 5 mg daily x 3 weeks, has 1 dose left tonight. \par CBC reviewed: WBC 3.2, H/H 8.1/25.2, neutrophils 1.6, PLT 72\par \par 4/20/2020: The patient is here for follow up . She has no complaints of fever, chills, dizziness , chest pain and shortness of breath . She is still with diarrhea , up to 10 watery BMs per day, responds poorly to imodium and lomotil. She is on Revlimid 5 mg daily , took last dose yesterday night. Her last Procrit was on April 13. \par \par 5/26/20\par Pt is here for follow up.\par She is feeling well. Denies SOB, chest pain, fever.\par Continues to have diarrhea although she is off of Revlmid.\par Thinks it may due to diabetic meds.\par Wants to discuss BM bx results\par \par 6/22/20\par Pt is here for follow up.\par Feels well. Denies any fever, N, V, D\par Continues to have diarrhea, she will talk to her PCP about changing metformin for DM.\par Has been needing PRBCs 1 unit each month\par \par 7/20/20\par Pt is here for follow up.\par No new complaints. Has been feeling well.\par No SOB, CP. \par No N, V, D.\par She has recd 2 units of PRBCs since May 20.\par \par 8/17/20\par Pt is here for a follow up visit.\par She is feeling well.\par No new symptoms. No N, V, D.\par No bruising or bleeding. She continues to need PRBCs every 2-3 weeks.\par \par 8/31/20\par Pt is here for follow up. She is feeling well. No N, V, D.\par She is tolerating the hydrea well. No SOB, CP\par No bruising ior bleeding\par \par 9/8/20\par Pt is here for follow up.\par She had been contacted by the [facility] last week to advise her to stop the hydrea. Pt did not check her messages and continued with Hydrea.\par No fever, N, V, bleeding.\par Saw Dr Ferrell last week.\par \par 9/14/20\par Pt is here for folow up.\par Besides her usual complaint of diarrhea she is feeling well.\par Recd 1 unit PRBC last week.\par Has stopped Hydrea.\par No fever, mouth sores.\par \par 9/29/20\par Pt is here for folow up.\par No new complaints.\par Is seeing GI for diarrhea net week.\par No fever, night sweats.\par REcd 3 units of PRBC this month\par \par 10/13/20\par Pt is here for follow up.\par No new complaints.\par Has not needed a transfusion since 3 weeks ago.\par Continues to have diarrhea, waiting to be seen by GI\par \par 10/26/20\par Pt is here for follow up.\par C/O feeling fatigued.\par Has CLARK.\par No nausea or vomiting.\par No fever.\par Diarrhea has improved a little. She is no longer on Metformin\par \par 11/9/20\par Pt is here for follow up.\par Feels well. Denies SOB, CP, fatigue\par \par \par 12/7/20- Patient is for follow up and is due for cycle 9 of Vidaza.  She still has loose BM sometimes 10 times a day and was seeing Dr. Corey from GI- did not follow up recently and is unable to get an appt with him. She has lost about 10 lbs since September. No N/V. No fever or chills. No CP/SOB. She has been getting PRBC transfusion every 3 weeks now\par \par 01/04/20 Pt presented today for f/u visit . She is s/p 8 cycles of vidaza and was started on Luspatercept in Dec , 2020 . So far she received 1 injection , she is due for 2nd injection today . Adverse effect profile was discussed with her in detail , she reports having some dizziness and weakness after first injection . She received blood transfusion last wk . Blood work from today reviewed as well and counts are adequate . She denies any fevers,  chills,  or any new symptoms . \par \par 1/25/21\par Pt is here for follow up.\par C/O diarrhea, otherwise feeling better.\par Has not needed a transfusion since 12/29\par \par 2/16/21\par Pt is here for follow up.\par Needed transfusion on 2/8/21.\par Continues to C/o fatigue. Diarrhea remains the same, has not made GI appt as yet.\par \par 3/8/21\par Pt is here for follow up.\par Feels better today. Recd 1 unit PRBCs last week.\par Diarrhea is same as before. has an appt with GI next week.\par No fever\par \par 3/30/21\par Pt is here for follow up.\par Feels better. Last transfusion was on 3/2/21\par Saw GI and was started on cholestyramine and metformin was DCED with resolution of diarrhea.\par She denies any SOB,\par Fatigue is better\par \par 4/20/2021\par Pt is here to f/u for MDS\par She is s/p Luspatercept cycle #6\par She is compliant with Aspirin\par She feels better today, appetite is good\par She denies shortness of breath, fatigue, or weakness \par She c/o of diarrhea but it has improved since she was started on Cholestyramine. Stool is soft and less in frequency\par She received COVID vaccine x 2 doses\par \par 5/11/21\par pt is here for follow up.\par Feels the same with fatigue, diarrhea.\par No SOB\par \par 6/21/21\par Pt is here for follow up.\par Feels well. No SOB, CP, N< V.\par Diarrhea is better controlled now.\par Last PRBC transfusion was 2 weeks ago.\par \par 7/19/21\par Pt is here for follow up.\par Feels a little tired, last transfusion was 6/1/21.\par No bleeding, fever, SOB\par \par 8/10/2021\par Patient is here to follow up for her MDS.\par She is on Luspatercept, tolerating well.\par She feels well today, the appetite is good.\par She denies shortness of breath, fatigue, fever, night sweats, abdominal pain, arthralgias/myalgias.\par \par 8/31/21\par Pt is here for follow up.\par C/O feeling very fatigued.\par No bleeding.\par No fever. [de-identified] : \par

## 2021-09-10 NOTE — CONSULT LETTER
[Dear  ___] : Dear ~GONZALES, [Courtesy Letter:] : I had the pleasure of seeing your patient, [unfilled], in my office today. [Please see my note below.] : Please see my note below. [Consult Closing:] : Thank you very much for allowing me to participate in the care of this patient.  If you have any questions, please do not hesitate to contact me. [Sincerely,] : Sincerely, [DrBakari  ___] : Dr. VOGT [FreeTextEntry2] : Dr. David Torres [FreeTextEntry3] : Dr. HUGO Montano

## 2021-09-13 ENCOUNTER — LABORATORY RESULT (OUTPATIENT)
Age: 76
End: 2021-09-13

## 2021-09-13 ENCOUNTER — APPOINTMENT (OUTPATIENT)
Dept: INFUSION THERAPY | Facility: CLINIC | Age: 76
End: 2021-09-13

## 2021-09-13 RX ORDER — PREGABALIN 225 MG/1
1000 CAPSULE ORAL ONCE
Refills: 0 | Status: COMPLETED | OUTPATIENT
Start: 2021-09-13 | End: 2021-09-13

## 2021-09-13 RX ORDER — ERYTHROPOIETIN 10000 [IU]/ML
80000 INJECTION, SOLUTION INTRAVENOUS; SUBCUTANEOUS ONCE
Refills: 0 | Status: COMPLETED | OUTPATIENT
Start: 2021-09-13 | End: 2021-09-13

## 2021-09-13 RX ADMIN — ERYTHROPOIETIN 80000 UNIT(S): 10000 INJECTION, SOLUTION INTRAVENOUS; SUBCUTANEOUS at 16:08

## 2021-09-13 RX ADMIN — PREGABALIN 1000 MICROGRAM(S): 225 CAPSULE ORAL at 16:07

## 2021-09-20 LAB
HCT VFR BLD CALC: 24.3 %
HGB BLD-MCNC: 8 G/DL
MCHC RBC-ENTMCNC: 31.4 PG
MCHC RBC-ENTMCNC: 32.9 G/DL
MCV RBC AUTO: 95.3 FL
PLATELET # BLD AUTO: 689 K/UL
PMV BLD: 10.3 FL
RBC # BLD: 2.55 M/UL
RBC # FLD: 21.9 %
WBC # FLD AUTO: 10.38 K/UL

## 2021-09-21 ENCOUNTER — OUTPATIENT (OUTPATIENT)
Dept: OUTPATIENT SERVICES | Facility: HOSPITAL | Age: 76
LOS: 1 days | Discharge: HOME | End: 2021-09-21

## 2021-09-21 ENCOUNTER — LABORATORY RESULT (OUTPATIENT)
Age: 76
End: 2021-09-21

## 2021-09-21 ENCOUNTER — APPOINTMENT (OUTPATIENT)
Dept: INFUSION THERAPY | Facility: CLINIC | Age: 76
End: 2021-09-21
Payer: MEDICARE

## 2021-09-21 ENCOUNTER — APPOINTMENT (OUTPATIENT)
Dept: HEMATOLOGY ONCOLOGY | Facility: CLINIC | Age: 76
End: 2021-09-21
Payer: MEDICARE

## 2021-09-21 VITALS
TEMPERATURE: 97.8 F | SYSTOLIC BLOOD PRESSURE: 139 MMHG | BODY MASS INDEX: 24.84 KG/M2 | DIASTOLIC BLOOD PRESSURE: 61 MMHG | HEIGHT: 62 IN | HEART RATE: 114 BPM | WEIGHT: 135 LBS

## 2021-09-21 DIAGNOSIS — E53.8 DEFICIENCY OF OTHER SPECIFIED B GROUP VITAMINS: ICD-10-CM

## 2021-09-21 DIAGNOSIS — D46.9 MYELODYSPLASTIC SYNDROME, UNSPECIFIED: ICD-10-CM

## 2021-09-21 DIAGNOSIS — Z51.11 ENCOUNTER FOR ANTINEOPLASTIC CHEMOTHERAPY: ICD-10-CM

## 2021-09-21 DIAGNOSIS — D64.9 ANEMIA, UNSPECIFIED: ICD-10-CM

## 2021-09-21 DIAGNOSIS — Z79.899 OTHER LONG TERM (CURRENT) DRUG THERAPY: ICD-10-CM

## 2021-09-21 PROCEDURE — 99214 OFFICE O/P EST MOD 30 MIN: CPT

## 2021-09-21 RX ORDER — ERYTHROPOIETIN 10000 [IU]/ML
80000 INJECTION, SOLUTION INTRAVENOUS; SUBCUTANEOUS ONCE
Refills: 0 | Status: COMPLETED | OUTPATIENT
Start: 2021-09-21 | End: 2021-09-21

## 2021-09-21 RX ORDER — LUSPATERCEPT 75 MG/1
110 INJECTION, POWDER, LYOPHILIZED, FOR SOLUTION SUBCUTANEOUS ONCE
Refills: 0 | Status: COMPLETED | OUTPATIENT
Start: 2021-09-21 | End: 2021-09-21

## 2021-09-21 RX ADMIN — LUSPATERCEPT 110 MILLIGRAM(S): 75 INJECTION, POWDER, LYOPHILIZED, FOR SOLUTION SUBCUTANEOUS at 17:33

## 2021-09-21 RX ADMIN — ERYTHROPOIETIN 80000 UNIT(S): 10000 INJECTION, SOLUTION INTRAVENOUS; SUBCUTANEOUS at 17:33

## 2021-09-22 LAB
ABO + RH PNL BLD: NORMAL
BLD GP AB SCN SERPL QL: NORMAL

## 2021-09-23 ENCOUNTER — APPOINTMENT (OUTPATIENT)
Dept: INFUSION THERAPY | Facility: CLINIC | Age: 76
End: 2021-09-23

## 2021-09-23 LAB
HCT VFR BLD CALC: 20.7 %
HGB BLD-MCNC: 6.7 G/DL
MCHC RBC-ENTMCNC: 31.6 PG
MCHC RBC-ENTMCNC: 32.4 G/DL
MCV RBC AUTO: 97.6 FL
PLATELET # BLD AUTO: 643 K/UL
PMV BLD: 10.1 FL
RBC # BLD: 2.12 M/UL
RBC # FLD: 22.7 %
WBC # FLD AUTO: 7.95 K/UL

## 2021-09-23 NOTE — HISTORY OF PRESENT ILLNESS
[de-identified] : She missed on appointment for procrit last week.\par She starts her next cycle on 6/25/18\par C/o back pain radiating down her left which occurred when she bent to  something.\par No change in diarrhea.\par \par 7/31/18:\par Doing well. On Revlimid for more than 2yrs, 5 mg 2 weeks on 1 week off. She will be starting week on tonight. \par C/o diarrhea. On Imodium 2 pills AM and 2 pills PM. Doesn't help much with diarrhea. \par C/o nausea, abdominal pain. \par \par \par Colonoscopy in 2016 was normal. \par Did not have blood transfusion for over 2 years. \par On procrit [id number] weekly. Missed last week on 7/24/18 as she was out of town. She has been non compliant with weekly Procrit.\par Mammogram in 2017 was normal. \par \par 9/11/18\par pt is here for follow up.\par She feels the lomotil helps with the diarrhea.\par She was away for a few weeks and missed her procrit injections.\par No fever, abdominal  discomfort has been less since since use of lomotil.\par She started a new cycle 2 days ago.\par \par 10/2/18:\par She will start Revlimid tonight (week on). 2 weeks on, 1 week off. \par On ASA 81mg. \par Denies fever, nausea, vomiting, chest pain, SOB, abdominal pain, and bladder problems.\par C/o diarrhea. \par S/p 2 units PRBC transfusion on 9/25/18. \par On Procrit 30180 units weekly. Not compliant every week. \par C/o intermittent dizziness. \par \par 10/31/18\par Pt is here for follow up.\par C/O significant fatigue.\par Continues to have diarrhea, takes imodium and lomotil as needed.\par C/o dry cough, no fever.\par Cough started a month ago. It is worse in the mornings however over last week it has been constant all day.\par No chest pain.\par She was found to have low B12 levels and was started on B12 injections.\par \par 11/27/18:\par Doing well. Hospitalized for 3 days for cellulitis/abcess of the back s/p drainage and on Abx currently. Developed 3 days after Mg infusion as per pt. \par Denies nausea, vomiting, chest pain, SOB, abdominal pain, bowel and bladder problems.\par This is the week on Revlimid (week 1).\par S/p 1 unit PRBC transfusion in the hospital. \par On Procrit weekly \par \par 12/27/18:\par Doing well. No major complaints.\par Denies fever, nausea, vomiting, chest pain, SOB, abdominal pain, and bladder problems.\par On Revlimid 5 mg 2 weeks on 1 week off. This is the week off. She will start the week on sunday (12/30/18).\par Due for Procrit 44436 units today. \par Still has diarrhea. 4-5bm/day.\par On monthly B12 injections. \par \par 1/30/19:\par Patient here for follow up for MDS.  She is taking Revlimid 5 mg, 2 weeks on, 1 week off.  She will complete this current cycle on Saturday.  She has no new complaints today.  Patient denies cough, shortness of breath, denies fever, denies bone pain.\par \par 03/04/2019\par Patient is here for a follow up visit. She complaints of severe pain in her left shoulder and has had abscess drained 2 weeks ago. However the pain is worse and she has purulent discharge on examination. She also has Nasal sores that are painful. Culture from 11/2018 showed MRSA.  Denies Fever. \par She also complaints of swelling in her right leg. Not tender and not erythematous and negative Gong;s sign. \par Her diarrhea with Revlimid is ongoing and Imodium and Lomotil helps but not everyday. \par She is on 60,000 units of procrit weekly and Revlimid 5 mg 2 weeks on 1 week off. \par \par 4/23/19\par Pt is here for follow up.\par She feels well.\par The nasal sores have improved with bactroban\par The abscess on left shoulder has resolved.\par However she has a new one on the middle of her back.\par No fever.\par Pt has been on procrit 46345 units weekly, however she missed a dose in between.\par \par 5/8/19\par pt is here for follow up.\par no new complaints.\par feels well.\par Her skin abscess has resolved.\par she has been unable to go to ID, as she could not get an appt.\par No bleeding.\par She is compliant with revlimid.\par \par 6/6/19\par Pt is here for follow up.\par no new complaints \par Feels well.\par Is on week 2 of her Revlimid cycle. \par No transfusions since last visit\par \par 7/17/19\par Pt is here for follow up.\par C/O fatigue.\par Was away for a few days two weeks ago, but missed treatment with procrit for 2 weeks.\par Is complaint with Revlimid 2 weeks on 1 week off.\par Diarrhea is a little improved.\par \par \par 8/14/19\par Patient here for follow up visit, feeling well.  Although she is complaining of some pain at the left scapula area recently.  Patient denies cough, shortness of breath, denies fever, denies other bone pain.  She is off Revlimid  this week, due to restart on Monday.  She is scheduled for Procrit and Vitamin B12 injection today.  She plans to leave the country tomorrow to visit her mother who is ill.  She will return in about 10 days.\par \par 9/11/19\par Pt is here for follow up.\par She was away for 3 weeks, out of which she took procrit for 2 weeks in [place].\par She missed last week's dose.\par She had CBCs in [place] which showed Hgb of 8.9\par She feels well.\par She still getting diarrhea.\par She has been using lomotil with very minimal relief.\par She started a new cycle of Revlimid 4 days ago.\par \par 10/3/19\par Patient here for follow up visit, feeling fairly well.   Patient denies cough, shortness of breath, denies fever, denies other bone pain.  She remains on Revlimid.  She is scheduled for Procrit and Vitamin B12 injection today.\par \par 10/24/19\par Pt is here for follow up.\par Feels well.\par No SOB, fatigue.\par Compliant with procrit and Revl;imid.\par Remains on ASa.\par Diarrhea is unchanged.\par Pt takes imodium as needed.\par \par 11/14/19\par Pt is here for follow up.\par No new complaints.\par Starts a new cycle of Revlimid today.\par Diarrhea is same as before, no rectal bleeding.\par No fever.\par \par 12/5/19\par Pt is here for follow up.\par No new complaints.\par Denies feeling weaker than usual.\par No fever, SOB.\par No change in frequency of diarrhea.\par No pain.\par Will start next cycle of Revlimid in 3 days.\par \par \par !/16/20\par Pt was recently DCed from [facility] 3 days ago after being treated for pneumonia and MARCO.\par She is on po Levaquin.\par She restarted Revlmid 3 days ago.\par She is feeling better now. No fever.\par No SOB, Chest pain and back pain has resolved.\par Pt has a cough, no expectoration.\par She also recd a unit of PRBCs last week in the hospital\par \par \par 1/30/20\par Patient here for follow up visit, feeling well.  She has no new complaints. Cough is almost gone completely.  Patient denies shortness of breath, denies fever, denies bone pain.  Her appetite is ok, weight is stable.  She is due to restart Revlimid on Monday.\par \par 02/20/20\par Pt is here for follow up, feeling well.\par Offers no new complaints. Denies SOB, fever, chills, weight loss, CP, cough. \par Currently off week of Revlimid 5 mg. Restarts on Monday.\par Has procrit 80,000 units today. Next b 12 injection due in 2 weeks \par Did not get chest Xray\par CBC reviewed WBC 3.1, h/h 8.3/25%, plt 386, neutrophils 1.29\par \par 3/12/20\par Pt is here for follow up.\par She took Revlimid for 2 weeks and then has been off for one week although she was advised to take it daily without break.\par No SOB, Cough, fever.\par Has mild fatigue.\par \par 04/02/2020\par SIMONA KUHN a 75 year F is here today for follow up visit of MDS.\par Denies fever, chills, cough,night sweats, weight loss. \par Denies bleeding or bruising.\par Pt receives procrit 80,000 units weekly and B12 IM monthly. \par Continued Revlimid 5 mg daily x 3 weeks, has 1 dose left tonight. \par CBC reviewed: WBC 3.2, H/H 8.1/25.2, neutrophils 1.6, PLT 72\par \par 4/20/2020: The patient is here for follow up . She has no complaints of fever, chills, dizziness , chest pain and shortness of breath . She is still with diarrhea , up to 10 watery BMs per day, responds poorly to imodium and lomotil. She is on Revlimid 5 mg daily , took last dose yesterday night. Her last Procrit was on April 13. \par \par 5/26/20\par Pt is here for follow up.\par She is feeling well. Denies SOB, chest pain, fever.\par Continues to have diarrhea although she is off of Revlmid.\par Thinks it may due to diabetic meds.\par Wants to discuss BM bx results\par \par 6/22/20\par Pt is here for follow up.\par Feels well. Denies any fever, N, V, D\par Continues to have diarrhea, she will talk to her PCP about changing metformin for DM.\par Has been needing PRBCs 1 unit each month\par \par 7/20/20\par Pt is here for follow up.\par No new complaints. Has been feeling well.\par No SOB, CP. \par No N, V, D.\par She has recd 2 units of PRBCs since May 20.\par \par 8/17/20\par Pt is here for a follow up visit.\par She is feeling well.\par No new symptoms. No N, V, D.\par No bruising or bleeding. She continues to need PRBCs every 2-3 weeks.\par \par 8/31/20\par Pt is here for follow up. She is feeling well. No N, V, D.\par She is tolerating the hydrea well. No SOB, CP\par No bruising ior bleeding\par \par 9/8/20\par Pt is here for follow up.\par She had been contacted by the [facility] last week to advise her to stop the hydrea. Pt did not check her messages and continued with Hydrea.\par No fever, N, V, bleeding.\par Saw Dr Ferrell last week.\par \par 9/14/20\par Pt is here for folow up.\par Besides her usual complaint of diarrhea she is feeling well.\par Recd 1 unit PRBC last week.\par Has stopped Hydrea.\par No fever, mouth sores.\par \par 9/29/20\par Pt is here for folow up.\par No new complaints.\par Is seeing GI for diarrhea net week.\par No fever, night sweats.\par REcd 3 units of PRBC this month\par \par 10/13/20\par Pt is here for follow up.\par No new complaints.\par Has not needed a transfusion since 3 weeks ago.\par Continues to have diarrhea, waiting to be seen by GI\par \par 10/26/20\par Pt is here for follow up.\par C/O feeling fatigued.\par Has CLARK.\par No nausea or vomiting.\par No fever.\par Diarrhea has improved a little. She is no longer on Metformin\par \par 11/9/20\par Pt is here for follow up.\par Feels well. Denies SOB, CP, fatigue\par \par \par 12/7/20- Patient is for follow up and is due for cycle 9 of Vidaza.  She still has loose BM sometimes 10 times a day and was seeing Dr. Corey from GI- did not follow up recently and is unable to get an appt with him. She has lost about 10 lbs since September. No N/V. No fever or chills. No CP/SOB. She has been getting PRBC transfusion every 3 weeks now\par \par 01/04/20 Pt presented today for f/u visit . She is s/p 8 cycles of vidaza and was started on Luspatercept in Dec , 2020 . So far she received 1 injection , she is due for 2nd injection today . Adverse effect profile was discussed with her in detail , she reports having some dizziness and weakness after first injection . She received blood transfusion last wk . Blood work from today reviewed as well and counts are adequate . She denies any fevers,  chills,  or any new symptoms . \par \par 1/25/21\par Pt is here for follow up.\par C/O diarrhea, otherwise feeling better.\par Has not needed a transfusion since 12/29\par \par 2/16/21\par Pt is here for follow up.\par Needed transfusion on 2/8/21.\par Continues to C/o fatigue. Diarrhea remains the same, has not made GI appt as yet.\par \par 3/8/21\par Pt is here for follow up.\par Feels better today. Recd 1 unit PRBCs last week.\par Diarrhea is same as before. has an appt with GI next week.\par No fever\par \par 3/30/21\par Pt is here for follow up.\par Feels better. Last transfusion was on 3/2/21\par Saw GI and was started on cholestyramine and metformin was DCED with resolution of diarrhea.\par She denies any SOB,\par Fatigue is better\par \par 4/20/2021\par Pt is here to f/u for MDS\par She is s/p Luspatercept cycle #6\par She is compliant with Aspirin\par She feels better today, appetite is good\par She denies shortness of breath, fatigue, or weakness \par She c/o of diarrhea but it has improved since she was started on Cholestyramine. Stool is soft and less in frequency\par She received COVID vaccine x 2 doses\par \par 5/11/21\par pt is here for follow up.\par Feels the same with fatigue, diarrhea.\par No SOB\par \par 6/21/21\par Pt is here for follow up.\par Feels well. No SOB, CP, N< V.\par Diarrhea is better controlled now.\par Last PRBC transfusion was 2 weeks ago.\par \par 7/19/21\par Pt is here for follow up.\par Feels a little tired, last transfusion was 6/1/21.\par No bleeding, fever, SOB\par \par 8/10/2021\par Patient is here to follow up for her MDS.\par She is on Luspatercept, tolerating well.\par She feels well today, the appetite is good.\par She denies shortness of breath, fatigue, fever, night sweats, abdominal pain, arthralgias/myalgias.\par \par 8/31/21\par Pt is here for follow up.\par C/O feeling very fatigued.\par No bleeding.\par No fever.\par \par 9/21/2021\par Patient is here to follow up for MDS.\par She is on Luspatercept and Retacrit, tolerating well.\par She gets blood transfusion as needed for Hgb <7 gm/dL\par She is c/o of fatigue, no shortness of breath, dizziness, chills or lightheadedness.\par She denies melena or hematochezia.\par Her appetite is good, no nausea/vomiting. [de-identified] : This is a 75 year old Black female with history of MDS. She's been on treatment with Revlimid 5 mg, 2 weeks on, 1 week off.  \par She is also on Procrit.\par  She underwent colonoscopy (2/2017)  Results noted no colitis, only hyperplastic polyp noted. \par  \par She has C/O abdominal pain and passage of mucus per rectum.\par She is planning to follow with her GI.\par Other than that she feels well.\par She is compliant with Revlimid .

## 2021-09-23 NOTE — ASSESSMENT
[FreeTextEntry1] : # Lowrisk myelodysplastic syndrome, previously treated with  Revlimid and Procrit, now on Vidaza . 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 \par Patient is s/p  9 cycles of Vidaza and she was requiring pRBC transfusion every 3 weeks \par Given the persistent transfusion dependence she was switched to Luspatercept.\par \par Hyperkalemia likely due to increased platelet; pseudohyperkalemia.\par CBC today showed persistent elevated platelets 643 K/uL Hgb 6.7 gm/dL WBC7.95 K/uL ANC 5.59 K/uL\par \par PLAN:\par - Continue  Luspatercept @ 1.75 mg/kg\par Adverse effect profile was discussed with her in detail and she was told that they include but are not limited to Hypertension, abdominal pain, transaminitis, fatigue, weakness, arthralgias and muscle aches .\par - Continue with Procrit 80,000 units every week\par Monitor CBC weekly and administer transfusions as needed\par - Pt to get 1 unit PRBC next available on 9/21/21 @ 12 NN\par \par # Vitamin  B 12 deficiency\par - Continue Vitamin B 12 injection every month\par \par # Thrombocytosis\par - S/P Hydrea\par - Will continue to monitor\par \par RTC in 3 weeks\par \par Case was seen and discussed with Dr Montano (covering for Dr. Wade) who agreed with assessment and plan.\par

## 2021-09-23 NOTE — REVIEW OF SYSTEMS
[Fatigue] : fatigue [Negative] : Gastrointestinal [Recent Change In Weight] : ~T no recent weight change [Shortness Of Breath] : no shortness of breath [Diarrhea] : no diarrhea

## 2021-09-28 ENCOUNTER — LABORATORY RESULT (OUTPATIENT)
Age: 76
End: 2021-09-28

## 2021-09-28 ENCOUNTER — APPOINTMENT (OUTPATIENT)
Dept: INFUSION THERAPY | Facility: CLINIC | Age: 76
End: 2021-09-28

## 2021-09-28 LAB
HCT VFR BLD CALC: 26.4 %
HGB BLD-MCNC: 8.9 G/DL
MCHC RBC-ENTMCNC: 30.6 PG
MCHC RBC-ENTMCNC: 33.7 G/DL
MCV RBC AUTO: 90.7 FL
PLATELET # BLD AUTO: 466 K/UL
PMV BLD: 11.4 FL
RBC # BLD: 2.91 M/UL
RBC # FLD: 22.2 %
WBC # FLD AUTO: 11.19 K/UL

## 2021-09-28 RX ORDER — ERYTHROPOIETIN 10000 [IU]/ML
80000 INJECTION, SOLUTION INTRAVENOUS; SUBCUTANEOUS ONCE
Refills: 0 | Status: COMPLETED | OUTPATIENT
Start: 2021-09-28 | End: 2021-09-28

## 2021-09-28 RX ADMIN — ERYTHROPOIETIN 80000 UNIT(S): 10000 INJECTION, SOLUTION INTRAVENOUS; SUBCUTANEOUS at 15:03

## 2021-10-05 ENCOUNTER — APPOINTMENT (OUTPATIENT)
Dept: INFUSION THERAPY | Facility: CLINIC | Age: 76
End: 2021-10-05

## 2021-10-05 ENCOUNTER — LABORATORY RESULT (OUTPATIENT)
Age: 76
End: 2021-10-05

## 2021-10-05 RX ORDER — ERYTHROPOIETIN 10000 [IU]/ML
80000 INJECTION, SOLUTION INTRAVENOUS; SUBCUTANEOUS ONCE
Refills: 0 | Status: COMPLETED | OUTPATIENT
Start: 2021-10-05 | End: 2021-10-05

## 2021-10-05 RX ADMIN — ERYTHROPOIETIN 80000 UNIT(S): 10000 INJECTION, SOLUTION INTRAVENOUS; SUBCUTANEOUS at 14:42

## 2021-10-06 LAB
HCT VFR BLD CALC: 23.8 %
HGB BLD-MCNC: 8.1 G/DL
MCHC RBC-ENTMCNC: 31.3 PG
MCHC RBC-ENTMCNC: 34 G/DL
MCV RBC AUTO: 91.9 FL
PLATELET # BLD AUTO: 762 K/UL
PMV BLD: 10.4 FL
RBC # BLD: 2.59 M/UL
RBC # FLD: 22.7 %
WBC # FLD AUTO: 12.55 K/UL

## 2021-10-12 ENCOUNTER — APPOINTMENT (OUTPATIENT)
Dept: INFUSION THERAPY | Facility: CLINIC | Age: 76
End: 2021-10-12

## 2021-10-12 ENCOUNTER — LABORATORY RESULT (OUTPATIENT)
Age: 76
End: 2021-10-12

## 2021-10-12 VITALS
HEART RATE: 93 BPM | RESPIRATION RATE: 16 BRPM | TEMPERATURE: 97.9 F | DIASTOLIC BLOOD PRESSURE: 55 MMHG | SYSTOLIC BLOOD PRESSURE: 109 MMHG

## 2021-10-12 RX ORDER — ERYTHROPOIETIN 10000 [IU]/ML
80000 INJECTION, SOLUTION INTRAVENOUS; SUBCUTANEOUS ONCE
Refills: 0 | Status: COMPLETED | OUTPATIENT
Start: 2021-10-12 | End: 2021-10-12

## 2021-10-12 RX ADMIN — ERYTHROPOIETIN 80000 UNIT(S): 10000 INJECTION, SOLUTION INTRAVENOUS; SUBCUTANEOUS at 15:18

## 2021-10-13 LAB
HCT VFR BLD CALC: 21.3 %
HGB BLD-MCNC: 7 G/DL
MCHC RBC-ENTMCNC: 31.3 PG
MCHC RBC-ENTMCNC: 32.9 G/DL
MCV RBC AUTO: 95.1 FL
PLATELET # BLD AUTO: 683 K/UL
PMV BLD: 10.9 FL
RBC # BLD: 2.24 M/UL
RBC # FLD: 23.5 %
WBC # FLD AUTO: 11.23 K/UL

## 2021-10-15 ENCOUNTER — APPOINTMENT (OUTPATIENT)
Dept: INFUSION THERAPY | Facility: CLINIC | Age: 76
End: 2021-10-15

## 2021-10-19 ENCOUNTER — APPOINTMENT (OUTPATIENT)
Dept: INFUSION THERAPY | Facility: CLINIC | Age: 76
End: 2021-10-19

## 2021-10-19 ENCOUNTER — LABORATORY RESULT (OUTPATIENT)
Age: 76
End: 2021-10-19

## 2021-10-19 RX ORDER — PREGABALIN 225 MG/1
1000 CAPSULE ORAL ONCE
Refills: 0 | Status: COMPLETED | OUTPATIENT
Start: 2021-10-19 | End: 2021-10-19

## 2021-10-19 RX ORDER — ERYTHROPOIETIN 10000 [IU]/ML
80000 INJECTION, SOLUTION INTRAVENOUS; SUBCUTANEOUS ONCE
Refills: 0 | Status: COMPLETED | OUTPATIENT
Start: 2021-10-19 | End: 2021-10-19

## 2021-10-19 RX ORDER — LUSPATERCEPT 75 MG/1
110 INJECTION, POWDER, LYOPHILIZED, FOR SOLUTION SUBCUTANEOUS ONCE
Refills: 0 | Status: COMPLETED | OUTPATIENT
Start: 2021-10-19 | End: 2021-10-19

## 2021-10-19 RX ADMIN — PREGABALIN 1000 MICROGRAM(S): 225 CAPSULE ORAL at 17:16

## 2021-10-19 RX ADMIN — ERYTHROPOIETIN 80000 UNIT(S): 10000 INJECTION, SOLUTION INTRAVENOUS; SUBCUTANEOUS at 17:15

## 2021-10-19 RX ADMIN — LUSPATERCEPT 110 MILLIGRAM(S): 75 INJECTION, POWDER, LYOPHILIZED, FOR SOLUTION SUBCUTANEOUS at 17:30

## 2021-10-20 ENCOUNTER — APPOINTMENT (OUTPATIENT)
Dept: INFUSION THERAPY | Facility: CLINIC | Age: 76
End: 2021-10-20

## 2021-10-25 LAB
ABO + RH PNL BLD: NORMAL
BLD GP AB SCN SERPL QL: NORMAL
HCT VFR BLD CALC: 19.6 %
HGB BLD-MCNC: 6.6 G/DL
MCHC RBC-ENTMCNC: 31.6 PG
MCHC RBC-ENTMCNC: 33.7 G/DL
MCV RBC AUTO: 93.8 FL
PLATELET # BLD AUTO: 905 K/UL
PMV BLD: 9.8 FL
RBC # BLD: 2.09 M/UL
RBC # FLD: 24.4 %
WBC # FLD AUTO: 11.83 K/UL

## 2021-10-28 ENCOUNTER — LABORATORY RESULT (OUTPATIENT)
Age: 76
End: 2021-10-28

## 2021-10-28 ENCOUNTER — APPOINTMENT (OUTPATIENT)
Dept: INFUSION THERAPY | Facility: CLINIC | Age: 76
End: 2021-10-28
Payer: MEDICARE

## 2021-10-28 ENCOUNTER — APPOINTMENT (OUTPATIENT)
Dept: HEMATOLOGY ONCOLOGY | Facility: CLINIC | Age: 76
End: 2021-10-28
Payer: MEDICARE

## 2021-10-28 ENCOUNTER — NON-APPOINTMENT (OUTPATIENT)
Age: 76
End: 2021-10-28

## 2021-10-28 VITALS
WEIGHT: 134 LBS | HEART RATE: 113 BPM | HEIGHT: 62 IN | SYSTOLIC BLOOD PRESSURE: 95 MMHG | TEMPERATURE: 98.6 F | DIASTOLIC BLOOD PRESSURE: 49 MMHG | RESPIRATION RATE: 17 BRPM | BODY MASS INDEX: 24.66 KG/M2

## 2021-10-28 LAB
HCT VFR BLD CALC: 27.7 %
HGB BLD-MCNC: 9.2 G/DL
MCHC RBC-ENTMCNC: 32.3 PG
MCHC RBC-ENTMCNC: 33.2 G/DL
MCV RBC AUTO: 97.2 FL
PLATELET # BLD AUTO: 882 K/UL
PMV BLD: 9.5 FL
RBC # BLD: 2.85 M/UL
RBC # FLD: 21.1 %
WBC # FLD AUTO: 12.55 K/UL

## 2021-10-28 PROCEDURE — 99215 OFFICE O/P EST HI 40 MIN: CPT

## 2021-10-28 RX ORDER — ERYTHROPOIETIN 10000 [IU]/ML
80000 INJECTION, SOLUTION INTRAVENOUS; SUBCUTANEOUS ONCE
Refills: 0 | Status: COMPLETED | OUTPATIENT
Start: 2021-10-28 | End: 2021-10-28

## 2021-10-28 RX ADMIN — ERYTHROPOIETIN 80000 UNIT(S): 10000 INJECTION, SOLUTION INTRAVENOUS; SUBCUTANEOUS at 13:55

## 2021-10-29 ENCOUNTER — EMERGENCY (EMERGENCY)
Facility: HOSPITAL | Age: 76
LOS: 0 days | Discharge: HOME | End: 2021-10-29
Attending: EMERGENCY MEDICINE | Admitting: EMERGENCY MEDICINE
Payer: MEDICARE

## 2021-10-29 VITALS
DIASTOLIC BLOOD PRESSURE: 57 MMHG | RESPIRATION RATE: 18 BRPM | WEIGHT: 134.92 LBS | HEART RATE: 102 BPM | HEIGHT: 62 IN | SYSTOLIC BLOOD PRESSURE: 115 MMHG

## 2021-10-29 DIAGNOSIS — Z79.82 LONG TERM (CURRENT) USE OF ASPIRIN: ICD-10-CM

## 2021-10-29 DIAGNOSIS — K52.9 NONINFECTIVE GASTROENTERITIS AND COLITIS, UNSPECIFIED: ICD-10-CM

## 2021-10-29 DIAGNOSIS — Z79.84 LONG TERM (CURRENT) USE OF ORAL HYPOGLYCEMIC DRUGS: ICD-10-CM

## 2021-10-29 DIAGNOSIS — R53.1 WEAKNESS: ICD-10-CM

## 2021-10-29 DIAGNOSIS — Z71.1 PERSON WITH FEARED HEALTH COMPLAINT IN WHOM NO DIAGNOSIS IS MADE: ICD-10-CM

## 2021-10-29 DIAGNOSIS — E87.5 HYPERKALEMIA: ICD-10-CM

## 2021-10-29 DIAGNOSIS — D46.9 MYELODYSPLASTIC SYNDROME, UNSPECIFIED: ICD-10-CM

## 2021-10-29 DIAGNOSIS — R79.9 ABNORMAL FINDING OF BLOOD CHEMISTRY, UNSPECIFIED: ICD-10-CM

## 2021-10-29 LAB
ALBUMIN SERPL ELPH-MCNC: 4.7 G/DL — SIGNIFICANT CHANGE UP (ref 3.5–5.2)
ALP SERPL-CCNC: 110 U/L — SIGNIFICANT CHANGE UP (ref 30–115)
ALT FLD-CCNC: 17 U/L — SIGNIFICANT CHANGE UP (ref 0–41)
ANION GAP SERPL CALC-SCNC: 14 MMOL/L — SIGNIFICANT CHANGE UP (ref 7–14)
AST SERPL-CCNC: 12 U/L — SIGNIFICANT CHANGE UP (ref 0–41)
BASE EXCESS BLDV CALC-SCNC: -12.5 MMOL/L — LOW (ref -2–3)
BASOPHILS # BLD AUTO: 0.11 K/UL — SIGNIFICANT CHANGE UP (ref 0–0.2)
BASOPHILS NFR BLD AUTO: 1.1 % — HIGH (ref 0–1)
BILIRUB SERPL-MCNC: 0.4 MG/DL — SIGNIFICANT CHANGE UP (ref 0.2–1.2)
BUN SERPL-MCNC: 47 MG/DL — HIGH (ref 10–20)
CA-I SERPL-SCNC: 1.37 MMOL/L — HIGH (ref 1.15–1.33)
CALCIUM SERPL-MCNC: 9.5 MG/DL — SIGNIFICANT CHANGE UP (ref 8.5–10.1)
CHLORIDE SERPL-SCNC: 104 MMOL/L — SIGNIFICANT CHANGE UP (ref 98–110)
CO2 SERPL-SCNC: 12 MMOL/L — LOW (ref 17–32)
CREAT SERPL-MCNC: 1.7 MG/DL — HIGH (ref 0.7–1.5)
EOSINOPHIL # BLD AUTO: 0.09 K/UL — SIGNIFICANT CHANGE UP (ref 0–0.7)
EOSINOPHIL NFR BLD AUTO: 0.9 % — SIGNIFICANT CHANGE UP (ref 0–8)
GAS PNL BLDV: 129 MMOL/L — LOW (ref 136–145)
GAS PNL BLDV: SIGNIFICANT CHANGE UP
GLUCOSE SERPL-MCNC: 219 MG/DL — HIGH (ref 70–99)
HCO3 BLDV-SCNC: 14 MMOL/L — LOW (ref 22–29)
HCT VFR BLD CALC: 24.2 % — LOW (ref 37–47)
HCT VFR BLDA CALC: 26 % — LOW (ref 34.5–46.5)
HGB BLD CALC-MCNC: 8.5 G/DL — LOW (ref 11.7–16.1)
HGB BLD-MCNC: 8.1 G/DL — LOW (ref 12–16)
IMM GRANULOCYTES NFR BLD AUTO: 0.9 % — HIGH (ref 0.1–0.3)
LACTATE BLDV-MCNC: 3.6 MMOL/L — HIGH (ref 0.5–2)
LYMPHOCYTES # BLD AUTO: 1.15 K/UL — LOW (ref 1.2–3.4)
LYMPHOCYTES # BLD AUTO: 11.5 % — LOW (ref 20.5–51.1)
MCHC RBC-ENTMCNC: 31.9 PG — HIGH (ref 27–31)
MCHC RBC-ENTMCNC: 33.5 G/DL — SIGNIFICANT CHANGE UP (ref 32–37)
MCV RBC AUTO: 95.3 FL — SIGNIFICANT CHANGE UP (ref 81–99)
MONOCYTES # BLD AUTO: 0.83 K/UL — HIGH (ref 0.1–0.6)
MONOCYTES NFR BLD AUTO: 8.3 % — SIGNIFICANT CHANGE UP (ref 1.7–9.3)
NEUTROPHILS # BLD AUTO: 7.73 K/UL — HIGH (ref 1.4–6.5)
NEUTROPHILS NFR BLD AUTO: 77.3 % — HIGH (ref 42.2–75.2)
NRBC # BLD: 0 /100 WBCS — SIGNIFICANT CHANGE UP (ref 0–0)
PCO2 BLDV: 36 MMHG — LOW (ref 39–42)
PH BLDV: 7.21 — LOW (ref 7.32–7.43)
PLATELET # BLD AUTO: 757 K/UL — HIGH (ref 130–400)
PO2 BLDV: 22 MMHG — SIGNIFICANT CHANGE UP
POTASSIUM BLDV-SCNC: 5.6 MMOL/L — HIGH (ref 3.5–5.1)
POTASSIUM SERPL-MCNC: 6 MMOL/L — CRITICAL HIGH (ref 3.5–5)
POTASSIUM SERPL-SCNC: 6 MMOL/L — CRITICAL HIGH (ref 3.5–5)
PROT SERPL-MCNC: 6.4 G/DL — SIGNIFICANT CHANGE UP (ref 6–8)
RBC # BLD: 2.54 M/UL — LOW (ref 4.2–5.4)
RBC # FLD: 21.6 % — HIGH (ref 11.5–14.5)
SAO2 % BLDV: 31.3 % — SIGNIFICANT CHANGE UP
SODIUM SERPL-SCNC: 130 MMOL/L — LOW (ref 135–146)
WBC # BLD: 10 K/UL — SIGNIFICANT CHANGE UP (ref 4.8–10.8)
WBC # FLD AUTO: 10 K/UL — SIGNIFICANT CHANGE UP (ref 4.8–10.8)

## 2021-10-29 PROCEDURE — 99285 EMERGENCY DEPT VISIT HI MDM: CPT

## 2021-10-29 PROCEDURE — 93010 ELECTROCARDIOGRAM REPORT: CPT

## 2021-10-29 RX ORDER — SODIUM CHLORIDE 9 MG/ML
1000 INJECTION, SOLUTION INTRAVENOUS ONCE
Refills: 0 | Status: COMPLETED | OUTPATIENT
Start: 2021-10-29 | End: 2021-10-29

## 2021-10-29 RX ADMIN — SODIUM CHLORIDE 1000 MILLILITER(S): 9 INJECTION, SOLUTION INTRAVENOUS at 17:05

## 2021-10-29 NOTE — ED ADULT NURSE NOTE - OBJECTIVE STATEMENT
pt reports elevated potassium had blood work done at [facility] and was called yesterday with results. denies complaijoleen. .

## 2021-10-29 NOTE — ED PROVIDER NOTE - PATIENT PORTAL LINK FT
You can access the FollowMyHealth Patient Portal offered by [facility] by registering at the following website: [email or web address]. By joining [employer]’s FollowMyHealth portal, you will also be able to view your health information using other applications (apps) compatible with our system.

## 2021-10-29 NOTE — ED PROVIDER NOTE - OBJECTIVE STATEMENT
76y F pmh MDS presenting with elevated potassium level down on routine blood work. Pt endorsing chronic diarrhea and some weakness. No f/c/n/v. No recent medication changes. No abdominal pain. No dietary changes. No chest pain/sob.

## 2021-10-29 NOTE — ED PROVIDER NOTE - NS ED ROS FT
Eyes:  No visual changes, eye pain or discharge.  ENMT:  No hearing changes, pain, no sore throat or runny nose, no difficulty swallowing  Cardiac:  No chest pain, SOB or edema. No chest pain with exertion.  Respiratory:  No cough or respiratory distress. No hemoptysis. No history of asthma or RAD.  GI:  No nausea, vomiting, diarrhea or abdominal pain.  :  No dysuria, frequency or burning.  MS:  No myalgia, joint pain or back pain.  Neuro:  No headache.  No LOC.  Skin:  No skin rash.   Endocrine: No history of thyroid disease or diabetes.

## 2021-10-29 NOTE — ED ADULT NURSE NOTE - NSIMPLEMENTINTERV_GEN_ALL_ED
Implemented All Fall Risk Interventions:  [place] to call system. Call bell, personal items and telephone within reach. Instruct patient to call for assistance. Room bathroom lighting operational. Non-slip footwear when patient is off stretcher. Physically safe environment: no spills, clutter or unnecessary equipment. Stretcher in lowest position, wheels locked, appropriate side rails in place. Provide visual cue, wrist band, yellow gown, etc. Monitor gait and stability. Monitor for mental status changes and reorient to person, place, and time. Review medications for side effects contributing to fall risk. Reinforce activity limits and safety measures with patient and family.

## 2021-10-29 NOTE — ED PROVIDER NOTE - ATTENDING CONTRIBUTION TO CARE
Pt here for elevated K noted on routine [place] [facility]  Hx of MDS with last trnasfusion 2 weeks ago.  Denies any urine output or color changes.  Some diarrhea recently.    Exma: soft NT abdomen, cap refill <2s, NAD, no CVA tenderness, NAD  Plan: labs, vbg, ekg

## 2021-10-29 NOTE — ED PROVIDER NOTE - NSFOLLOWUPINSTRUCTIONS_ED_ALL_ED_FT
Weakness    WHAT YOU NEED TO KNOW:    Weakness is a loss of muscle strength. It may be caused by brain, nerve, or muscle problems. Physical and mental conditions such as heart problems, pregnancy, dehydration, or depression may also cause weakness. Reactions to certain drugs can cause weakness. Parts of your body may become weak if you need to wear a cast or splint or have been on bed rest for a long time.    DISCHARGE INSTRUCTIONS:    Call 911 for any of the following:     You have any of the following signs of a stroke:   Numbness or drooping on one side of your face       Weakness in an arm or leg      Confusion or difficulty speaking      Dizziness, a severe headache, or vision loss      You lose feeling in your weakened body area.      You have electric shock-like feelings down your arms and legs when you flex or move your neck.      You have sudden or increased trouble speaking, swallowing, or breathing.    Return to the emergency department if:     You have severe pain in your back, arms, or legs that worsens.      You have sudden or worsened muscle weakness or loss of movement.      You are not able to control when you urinate or have a bowel movement.    Contact your healthcare provider if:     You feel depressed or anxious.       You have questions or concerns about your condition or care.     Manage weakness:     Use assistive devices as directed. These help protect you from injury. Examples include a walker or cane. Have someone install handrails in your home. These will help you get out of a bathtub or stand up from a toilet. Use a shower chair so you can sit while you shower. Sit down on the toilet or another chair to dry off and put on your clothes. Get help going up and down stairs if your legs are weak.       Go to physical or occupational therapy if directed. A physical therapist can teach you exercises to help strengthen weak muscles. An occupational therapist can show you ways to do your daily activities more easily. For example, light forks and spoons can be easier to use if you have hand weakness. You may also learn ways to organize your household items so you are not moving heavy items.      Balance rest with exercise. Exercise can help increase your muscle strength and energy. Do not exercise for long periods at a time. Take breaks often to rest. Too much exercise can cause muscle strain or make you more tired. Ask your healthcare provider how much exercise is right for you.      Eat a variety of healthy foods. Too much or too little food may cause weakness or tiredness. Ask your healthcare provider what a healthy amount of food is for you. Healthy foods include fruits, vegetables, whole-grain breads, low-fat dairy products, lean meats and fish, nuts, and cooked beans.      Do not smoke. Nicotine and other chemicals in cigarettes and cigars can make your symptoms worse, and can cause lung damage. Ask your healthcare provider for information if you currently smoke and need help to quit. E-cigarettes or smokeless tobacco still contain nicotine. Talk to your healthcare provider before you use these products.       Do not use caffeine, alcohol, or illegal drugs. These may cause muscle twitching, which could lead to worsened weakness.     Follow up with your healthcare provider as directed: Write down your questions so you remember to ask them during your visits.

## 2021-10-29 NOTE — ED ADULT NURSE NOTE - CHIEF COMPLAINT QUOTE
pt reports elevated potassium had blood work done at [facility] and was called yesterday with results. denies complaijoleen. .

## 2021-10-29 NOTE — ED PROVIDER NOTE - PHYSICAL EXAMINATION
RT UNAVAILABLE. NEB TX DONE. CPAP IN PLACE. WILL HAVE RT EVALUATE WHEN
AVAILABLE. CALL LIGHT WITHIN REACH. CONSTITUTIONAL: Well-developed; well-nourished; in no acute distress.   SKIN: warm, dry  HEAD: Normocephalic; atraumatic.  EYES: no conjunctival injection. PERRL.   ENT: No nasal discharge; airway clear.  NECK: Supple; non tender.  CARD: S1, S2 normal; no murmurs, gallops, or rubs. Regular rate and rhythm.   RESP: No wheezes, rales or rhonchi.  ABD: soft ntnd  EXT: Normal ROM.  No clubbing, cyanosis or edema.   LYMPH: No acute cervical adenopathy.  NEURO: Alert, oriented, grossly unremarkable  PSYCH: Cooperative, appropriate.

## 2021-10-29 NOTE — ED ADULT TRIAGE NOTE - CHIEF COMPLAINT QUOTE
pt reports elevated potassium had blood work done at [facility] and was called yesterday with results. denies complaijoleen. .

## 2021-10-31 LAB
ALBUMIN SERPL ELPH-MCNC: 4.9 G/DL
ALP BLD-CCNC: 110 U/L
ALT SERPL-CCNC: 16 U/L
ANION GAP SERPL CALC-SCNC: 18 MMOL/L
AST SERPL-CCNC: 10 U/L
BILIRUB SERPL-MCNC: 0.7 MG/DL
BUN SERPL-MCNC: 41 MG/DL
CALCIUM SERPL-MCNC: 9.1 MG/DL
CHLORIDE SERPL-SCNC: 104 MMOL/L
CO2 SERPL-SCNC: 13 MMOL/L
CREAT SERPL-MCNC: 1.7 MG/DL
GLUCOSE SERPL-MCNC: 466 MG/DL
POTASSIUM SERPL-SCNC: 6.9 MMOL/L
PROT SERPL-MCNC: 6.7 G/DL
SODIUM SERPL-SCNC: 135 MMOL/L

## 2021-11-01 NOTE — HISTORY OF PRESENT ILLNESS
[de-identified] : She missed on appointment for procrit last week.\par She starts her next cycle on 6/25/18\par C/o back pain radiating down her left which occurred when she bent to  something.\par No change in diarrhea.\par \par 7/31/18:\par Doing well. On Revlimid for more than 2yrs, 5 mg 2 weeks on 1 week off. She will be starting week on tonight. \par C/o diarrhea. On Imodium 2 pills AM and 2 pills PM. Doesn't help much with diarrhea. \par C/o nausea, abdominal pain. \par \par \par Colonoscopy in 2016 was normal. \par Did not have blood transfusion for over 2 years. \par On procrit [id number] weekly. Missed last week on 7/24/18 as she was out of town. She has been non compliant with weekly Procrit.\par Mammogram in 2017 was normal. \par \par 9/11/18\par pt is here for follow up.\par She feels the lomotil helps with the diarrhea.\par She was away for a few weeks and missed her procrit injections.\par No fever, abdominal  discomfort has been less since since use of lomotil.\par She started a new cycle 2 days ago.\par \par 10/2/18:\par She will start Revlimid tonight (week on). 2 weeks on, 1 week off. \par On ASA 81mg. \par Denies fever, nausea, vomiting, chest pain, SOB, abdominal pain, and bladder problems.\par C/o diarrhea. \par S/p 2 units PRBC transfusion on 9/25/18. \par On Procrit 76469 units weekly. Not compliant every week. \par C/o intermittent dizziness. \par \par 10/31/18\par Pt is here for follow up.\par C/O significant fatigue.\par Continues to have diarrhea, takes imodium and lomotil as needed.\par C/o dry cough, no fever.\par Cough started a month ago. It is worse in the mornings however over last week it has been constant all day.\par No chest pain.\par She was found to have low B12 levels and was started on B12 injections.\par \par 11/27/18:\par Doing well. Hospitalized for 3 days for cellulitis/abcess of the back s/p drainage and on Abx currently. Developed 3 days after Mg infusion as per pt. \par Denies nausea, vomiting, chest pain, SOB, abdominal pain, bowel and bladder problems.\par This is the week on Revlimid (week 1).\par S/p 1 unit PRBC transfusion in the hospital. \par On Procrit weekly \par \par 12/27/18:\par Doing well. No major complaints.\par Denies fever, nausea, vomiting, chest pain, SOB, abdominal pain, and bladder problems.\par On Revlimid 5 mg 2 weeks on 1 week off. This is the week off. She will start the week on sunday (12/30/18).\par Due for Procrit 69822 units today. \par Still has diarrhea. 4-5bm/day.\par On monthly B12 injections. \par \par 1/30/19:\par Patient here for follow up for MDS.  She is taking Revlimid 5 mg, 2 weeks on, 1 week off.  She will complete this current cycle on Saturday.  She has no new complaints today.  Patient denies cough, shortness of breath, denies fever, denies bone pain.\par \par 03/04/2019\par Patient is here for a follow up visit. She complaints of severe pain in her left shoulder and has had abscess drained 2 weeks ago. However the pain is worse and she has purulent discharge on examination. She also has Nasal sores that are painful. Culture from 11/2018 showed MRSA.  Denies Fever. \par She also complaints of swelling in her right leg. Not tender and not erythematous and negative Gong;s sign. \par Her diarrhea with Revlimid is ongoing and Imodium and Lomotil helps but not everyday. \par She is on 60,000 units of procrit weekly and Revlimid 5 mg 2 weeks on 1 week off. \par \par 4/23/19\par Pt is here for follow up.\par She feels well.\par The nasal sores have improved with bactroban\par The abscess on left shoulder has resolved.\par However she has a new one on the middle of her back.\par No fever.\par Pt has been on procrit 78660 units weekly, however she missed a dose in between.\par \par 5/8/19\par pt is here for follow up.\par no new complaints.\par feels well.\par Her skin abscess has resolved.\par she has been unable to go to ID, as she could not get an appt.\par No bleeding.\par She is compliant with revlimid.\par \par 6/6/19\par Pt is here for follow up.\par no new complaints \par Feels well.\par Is on week 2 of her Revlimid cycle. \par No transfusions since last visit\par \par 7/17/19\par Pt is here for follow up.\par C/O fatigue.\par Was away for a few days two weeks ago, but missed treatment with procrit for 2 weeks.\par Is complaint with Revlimid 2 weeks on 1 week off.\par Diarrhea is a little improved.\par \par 8/14/19\par Patient here for follow up visit, feeling well.  Although she is complaining of some pain at the left scapula area recently.  Patient denies cough, shortness of breath, denies fever, denies other bone pain.  She is off Revlimid  this week, due to restart on Monday.  She is scheduled for Procrit and Vitamin B12 injection today.  She plans to leave the country tomorrow to visit her mother who is ill.  She will return in about 10 days.\par \par 9/11/19\par Pt is here for follow up.\par She was away for 3 weeks, out of which she took procrit for 2 weeks in [place].\par She missed last week's dose.\par She had CBCs in [place] which showed Hgb of 8.9\par She feels well.\par She still getting diarrhea.\par She has been using lomotil with very minimal relief.\par She started a new cycle of Revlimid 4 days ago.\par \par 10/3/19\par Patient here for follow up visit, feeling fairly well.   Patient denies cough, shortness of breath, denies fever, denies other bone pain.  She remains on Revlimid.  She is scheduled for Procrit and Vitamin B12 injection today.\par \par 10/24/19\par Pt is here for follow up.\par Feels well.\par No SOB, fatigue.\par Compliant with procrit and Revl;imid.\par Remains on ASa.\par Diarrhea is unchanged.\par Pt takes imodium as needed.\par \par 11/14/19\par Pt is here for follow up.\par No new complaints.\par Starts a new cycle of Revlimid today.\par Diarrhea is same as before, no rectal bleeding.\par No fever.\par \par 12/5/19\par Pt is here for follow up.\par No new complaints.\par Denies feeling weaker than usual.\par No fever, SOB.\par No change in frequency of diarrhea.\par No pain.\par Will start next cycle of Revlimid in 3 days.\par \par \par !/16/20\par Pt was recently DCed from [facility] 3 days ago after being treated for pneumonia and MARCO.\par She is on po Levaquin.\par She restarted Revlmid 3 days ago.\par She is feeling better now. No fever.\par No SOB, Chest pain and back pain has resolved.\par Pt has a cough, no expectoration.\par She also recd a unit of PRBCs last week in the hospital\par \par \par 1/30/20\par Patient here for follow up visit, feeling well.  She has no new complaints. Cough is almost gone completely.  Patient denies shortness of breath, denies fever, denies bone pain.  Her appetite is ok, weight is stable.  She is due to restart Revlimid on Monday.\par \par 02/20/20\par Pt is here for follow up, feeling well.\par Offers no new complaints. Denies SOB, fever, chills, weight loss, CP, cough. \par Currently off week of Revlimid 5 mg. Restarts on Monday.\par Has procrit 80,000 units today. Next b 12 injection due in 2 weeks \par Did not get chest Xray\par CBC reviewed WBC 3.1, h/h 8.3/25%, plt 386, neutrophils 1.29\par \par 3/12/20\par Pt is here for follow up.\par She took Revlimid for 2 weeks and then has been off for one week although she was advised to take it daily without break.\par No SOB, Cough, fever.\par Has mild fatigue.\par \par 04/02/2020\par SIMONA KUHN a 75 year F is here today for follow up visit of MDS.\par Denies fever, chills, cough,night sweats, weight loss. \par Denies bleeding or bruising.\par Pt receives procrit 80,000 units weekly and B12 IM monthly. \par Continued Revlimid 5 mg daily x 3 weeks, has 1 dose left tonight. \par CBC reviewed: WBC 3.2, H/H 8.1/25.2, neutrophils 1.6, PLT 72\par \par 4/20/2020: The patient is here for follow up . She has no complaints of fever, chills, dizziness , chest pain and shortness of breath . She is still with diarrhea , up to 10 watery BMs per day, responds poorly to imodium and lomotil. She is on Revlimid 5 mg daily , took last dose yesterday night. Her last Procrit was on April 13. \par \par 5/26/20\par Pt is here for follow up.\par She is feeling well. Denies SOB, chest pain, fever.\par Continues to have diarrhea although she is off of Revlmid.\par Thinks it may due to diabetic meds.\par Wants to discuss BM bx results\par \par 6/22/20\par Pt is here for follow up.\par Feels well. Denies any fever, N, V, D\par Continues to have diarrhea, she will talk to her PCP about changing metformin for DM.\par Has been needing PRBCs 1 unit each month\par \par 7/20/20\par Pt is here for follow up.\par No new complaints. Has been feeling well.\par No SOB, CP. \par No N, V, D.\par She has recd 2 units of PRBCs since May 20.\par \par 8/17/20\par Pt is here for a follow up visit.\par She is feeling well.\par No new symptoms. No N, V, D.\par No bruising or bleeding. She continues to need PRBCs every 2-3 weeks.\par \par 8/31/20\par Pt is here for follow up. She is feeling well. No N, V, D.\par She is tolerating the hydrea well. No SOB, CP\par No bruising ior bleeding\par \par 9/8/20\par Pt is here for follow up.\par She had been contacted by the [facility] last week to advise her to stop the hydrea. Pt did not check her messages and continued with Hydrea.\par No fever, N, V, bleeding.\par Saw Dr Ferrell last week.\par \par 9/14/20\par Pt is here for folow up.\par Besides her usual complaint of diarrhea she is feeling well.\par Recd 1 unit PRBC last week.\par Has stopped Hydrea.\par No fever, mouth sores.\par \par 9/29/20\par Pt is here for folow up.\par No new complaints.\par Is seeing GI for diarrhea net week.\par No fever, night sweats.\par REcd 3 units of PRBC this month\par \par 10/13/20\par Pt is here for follow up.\par No new complaints.\par Has not needed a transfusion since 3 weeks ago.\par Continues to have diarrhea, waiting to be seen by GI\par \par 10/26/20\par Pt is here for follow up.\par C/O feeling fatigued.\par Has CLARK.\par No nausea or vomiting.\par No fever.\par Diarrhea has improved a little. She is no longer on Metformin\par \par 11/9/20\par Pt is here for follow up.\par Feels well. Denies SOB, CP, fatigue\par \par 12/7/20- Patient is for follow up and is due for cycle 9 of Vidaza.  She still has loose BM sometimes 10 times a day and was seeing Dr. Corey from GI- did not follow up recently and is unable to get an appt with him. She has lost about 10 lbs since September. No N/V. No fever or chills. No CP/SOB. She has been getting PRBC transfusion every 3 weeks now\par \par 01/04/20 Pt presented today for f/u visit . She is s/p 8 cycles of vidaza and was started on Luspatercept in Dec , 2020 . So far she received 1 injection , she is due for 2nd injection today . Adverse effect profile was discussed with her in detail , she reports having some dizziness and weakness after first injection . She received blood transfusion last wk . Blood work from today reviewed as well and counts are adequate . She denies any fevers,  chills,  or any new symptoms . \par \par 1/25/21\par Pt is here for follow up.\par C/O diarrhea, otherwise feeling better.\par Has not needed a transfusion since 12/29\par \par 2/16/21\par Pt is here for follow up.\par Needed transfusion on 2/8/21.\par Continues to C/o fatigue. Diarrhea remains the same, has not made GI appt as yet.\par \par 3/8/21\par Pt is here for follow up.\par Feels better today. Recd 1 unit PRBCs last week.\par Diarrhea is same as before. has an appt with GI next week.\par No fever\par \par 3/30/21\par Pt is here for follow up.\par Feels better. Last transfusion was on 3/2/21\par Saw GI and was started on cholestyramine and metformin was DCED with resolution of diarrhea.\par She denies any SOB,\par Fatigue is better\par \par 4/20/2021\par Pt is here to f/u for MDS\par She is s/p Luspatercept cycle #6\par She is compliant with Aspirin\par She feels better today, appetite is good\par She denies shortness of breath, fatigue, or weakness \par She c/o of diarrhea but it has improved since she was started on Cholestyramine. Stool is soft and less in frequency\par She received COVID vaccine x 2 doses\par \par 5/11/21\par pt is here for follow up.\par Feels the same with fatigue, diarrhea.\par No SOB\par \par 6/21/21\par Pt is here for follow up.\par Feels well. No SOB, CP, N< V.\par Diarrhea is better controlled now.\par Last PRBC transfusion was 2 weeks ago.\par \par 7/19/21\par Pt is here for follow up.\par Feels a little tired, last transfusion was 6/1/21.\par No bleeding, fever, SOB\par \par 8/10/2021\par Patient is here to follow up for her MDS.\par She is on Luspatercept, tolerating well.\par She feels well today, the appetite is good.\par She denies shortness of breath, fatigue, fever, night sweats, abdominal pain, arthralgias/myalgias.\par \par 8/31/21\par Pt is here for follow up.\par C/O feeling very fatigued.\par No bleeding.\par No fever.\par \par 9/21/2021\par Patient is here to follow up for MDS.\par She is on Luspatercept and Retacrit, tolerating well.\par She gets blood transfusion as needed for Hgb <7 gm/dL\par She is c/o of fatigue, no shortness of breath, dizziness, chills or lightheadedness.\par She denies melena or hematochezia.\par Her appetite is good, no nausea/vomiting.\par \par 10/28/2021\par Patient is here to follow up for MDS.\par She is on Luspatercept and Retacrit, tolerating well.\par She gets blood transfusion to keep Hgb > 7 gm/dL.\par She is c/o of chronic fatigue, no shortness of breath, dizziness, chills or lightheadedness.\par She denies melena or hematochezia.\par Her appetite is good, no nausea/vomiting.\par She c/o of severe diarrhea, 10-12x/day watery stool while on Imodium in the last 2 weeks.\par Last colonoscopy was in 2016, benign as per patient. [de-identified] : This is a 75 year old Black female with history of MDS. She's been on treatment with Revlimid 5 mg, 2 weeks on, 1 week off.  \par She is also on Procrit.\par  She underwent colonoscopy (2/2017)  Results noted no colitis, only hyperplastic polyp noted. \par  \par She has C/O abdominal pain and passage of mucus per rectum.\par She is planning to follow with her GI.\par Other than that she feels well.\par She is compliant with Revlimid .

## 2021-11-01 NOTE — REVIEW OF SYSTEMS
[Fatigue] : fatigue [Negative] : Allergic/Immunologic [Recent Change In Weight] : ~T recent weight change [Diarrhea] : diarrhea [Shortness Of Breath] : no shortness of breath [FreeTextEntry2] : weight loss 10 lbs in the last 4 months [FreeTextEntry7] : watery diarrhea

## 2021-11-01 NOTE — CONSULT LETTER
[Dear  ___] : Dear ~GONZALES, [Courtesy Letter:] : I had the pleasure of seeing your patient, [unfilled], in my office today. [Please see my note below.] : Please see my note below. [Consult Closing:] : Thank you very much for allowing me to participate in the care of this patient.  If you have any questions, please do not hesitate to contact me. [Sincerely,] : Sincerely, [DrBakari  ___] : Dr. VOGT [FreeTextEntry2] : Dr. David Torres [FreeTextEntry3] : Nicolás Andres NP

## 2021-11-01 NOTE — ASSESSMENT
[FreeTextEntry1] : Patient is a 76 year old female with # Lowrisk myelodysplastic syndrome, previously treated with Revlimid and Procrit and Vidaza . 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 \par Patient is s/p  9 cycles of Vidaza and she was requiring pRBC transfusion every 3 weeks \par Given the persistent transfusion dependence she was switched to Luspatercept.\par \par Hyperkalemia likely due to increased platelet; pseudohyperkalemia.\par \par PLAN:\par -- Will hold Luspatercept 1.75 mg/kg every 3 weeks, pending stool C-diff evaluation.\par -- Continue Procrit 80,000 units every week. Hgb 9.2 gm/dL\par Side effects of Procrit discussed including but not limited to: hypertension, headache, pruritus, nausea, vomiting, injection site pain, arthralgia, cough, fever.\par -- Monitor CBC weekly and administer transfusions as needed to keep Hgb >7 gm/dL.\par \par # Vitamin  B 12 deficiency\par -- Continue Vitamin B 12 injection every month\par \par # Thrombocytosis\par -- S/P Hydrea\par -- Will continue to monitor.\par \par #Diarrhea\par -- Will do stool C-diff.\par \par RTC in 3 weeks\par \par Case was seen and discussed with Dr Bates (covering for Dr. Wade) who agreed with assessment and plan.\par \par \par

## 2021-11-02 ENCOUNTER — LABORATORY RESULT (OUTPATIENT)
Age: 76
End: 2021-11-02

## 2021-11-02 ENCOUNTER — APPOINTMENT (OUTPATIENT)
Dept: INFUSION THERAPY | Facility: CLINIC | Age: 76
End: 2021-11-02

## 2021-11-02 RX ORDER — ERYTHROPOIETIN 10000 [IU]/ML
80000 INJECTION, SOLUTION INTRAVENOUS; SUBCUTANEOUS ONCE
Refills: 0 | Status: COMPLETED | OUTPATIENT
Start: 2021-11-02 | End: 2021-11-02

## 2021-11-02 RX ADMIN — ERYTHROPOIETIN 80000 UNIT(S): 10000 INJECTION, SOLUTION INTRAVENOUS; SUBCUTANEOUS at 15:40

## 2021-11-05 LAB
HCT VFR BLD CALC: 24.1 %
HGB BLD-MCNC: 8.2 G/DL
MCHC RBC-ENTMCNC: 32.2 PG
MCHC RBC-ENTMCNC: 34 G/DL
MCV RBC AUTO: 94.5 FL
PLATELET # BLD AUTO: 504 K/UL
PMV BLD: 10.9 FL
RBC # BLD: 2.55 M/UL
RBC # FLD: 21.7 %
WBC # FLD AUTO: 12.08 K/UL

## 2021-11-09 ENCOUNTER — APPOINTMENT (OUTPATIENT)
Dept: INFUSION THERAPY | Facility: CLINIC | Age: 76
End: 2021-11-09

## 2021-11-09 ENCOUNTER — LABORATORY RESULT (OUTPATIENT)
Age: 76
End: 2021-11-09

## 2021-11-09 LAB
HCT VFR BLD CALC: 21.5 %
HGB BLD-MCNC: 7.3 G/DL
MCHC RBC-ENTMCNC: 32 PG
MCHC RBC-ENTMCNC: 34 G/DL
MCV RBC AUTO: 94.3 FL
PLATELET # BLD AUTO: 388 K/UL
PMV BLD: 11.4 FL
RBC # BLD: 2.28 M/UL
RBC # FLD: 22 %
WBC # FLD AUTO: 14.1 K/UL

## 2021-11-09 RX ORDER — LUSPATERCEPT 75 MG/1
110 INJECTION, POWDER, LYOPHILIZED, FOR SOLUTION SUBCUTANEOUS ONCE
Refills: 0 | Status: COMPLETED | OUTPATIENT
Start: 2021-11-09 | End: 2021-11-09

## 2021-11-09 RX ORDER — ERYTHROPOIETIN 10000 [IU]/ML
40000 INJECTION, SOLUTION INTRAVENOUS; SUBCUTANEOUS ONCE
Refills: 0 | Status: COMPLETED | OUTPATIENT
Start: 2021-11-09 | End: 2021-11-09

## 2021-11-09 RX ADMIN — ERYTHROPOIETIN 40000 UNIT(S): 10000 INJECTION, SOLUTION INTRAVENOUS; SUBCUTANEOUS at 16:02

## 2021-11-09 RX ADMIN — LUSPATERCEPT 110 MILLIGRAM(S): 75 INJECTION, POWDER, LYOPHILIZED, FOR SOLUTION SUBCUTANEOUS at 16:03

## 2021-11-15 ENCOUNTER — APPOINTMENT (OUTPATIENT)
Dept: HEMATOLOGY ONCOLOGY | Facility: CLINIC | Age: 76
End: 2021-11-15

## 2021-11-18 ENCOUNTER — LABORATORY RESULT (OUTPATIENT)
Age: 76
End: 2021-11-18

## 2021-11-18 ENCOUNTER — APPOINTMENT (OUTPATIENT)
Dept: INFUSION THERAPY | Facility: CLINIC | Age: 76
End: 2021-11-18

## 2021-11-18 ENCOUNTER — APPOINTMENT (OUTPATIENT)
Dept: HEMATOLOGY ONCOLOGY | Facility: CLINIC | Age: 76
End: 2021-11-18
Payer: MEDICARE

## 2021-11-18 ENCOUNTER — INPATIENT (INPATIENT)
Facility: HOSPITAL | Age: 76
LOS: 1 days | Discharge: HOME | End: 2021-11-20
Attending: INTERNAL MEDICINE | Admitting: INTERNAL MEDICINE
Payer: MEDICARE

## 2021-11-18 VITALS
OXYGEN SATURATION: 100 % | DIASTOLIC BLOOD PRESSURE: 60 MMHG | WEIGHT: 138.89 LBS | SYSTOLIC BLOOD PRESSURE: 125 MMHG | HEIGHT: 62 IN | RESPIRATION RATE: 18 BRPM | TEMPERATURE: 98 F | HEART RATE: 100 BPM

## 2021-11-18 VITALS
DIASTOLIC BLOOD PRESSURE: 58 MMHG | RESPIRATION RATE: 16 BRPM | HEART RATE: 114 BPM | TEMPERATURE: 98.2 F | SYSTOLIC BLOOD PRESSURE: 138 MMHG

## 2021-11-18 LAB
ALBUMIN SERPL ELPH-MCNC: 4.2 G/DL — SIGNIFICANT CHANGE UP (ref 3.5–5.2)
ALP SERPL-CCNC: 107 U/L — SIGNIFICANT CHANGE UP (ref 30–115)
ALT FLD-CCNC: 10 U/L — SIGNIFICANT CHANGE UP (ref 0–41)
ANION GAP SERPL CALC-SCNC: 19 MMOL/L — HIGH (ref 7–14)
AST SERPL-CCNC: 8 U/L — SIGNIFICANT CHANGE UP (ref 0–41)
BASE EXCESS BLDV CALC-SCNC: -8.2 MMOL/L — LOW (ref -2–3)
BASOPHILS # BLD AUTO: 0.12 K/UL — SIGNIFICANT CHANGE UP (ref 0–0.2)
BASOPHILS NFR BLD AUTO: 1.3 % — HIGH (ref 0–1)
BILIRUB SERPL-MCNC: 0.4 MG/DL — SIGNIFICANT CHANGE UP (ref 0.2–1.2)
BLD GP AB SCN SERPL QL: SIGNIFICANT CHANGE UP
BUN SERPL-MCNC: 33 MG/DL — HIGH (ref 10–20)
CA-I SERPL-SCNC: 1.29 MMOL/L — SIGNIFICANT CHANGE UP (ref 1.15–1.33)
CALCIUM SERPL-MCNC: 9.3 MG/DL — SIGNIFICANT CHANGE UP (ref 8.5–10.1)
CHLORIDE SERPL-SCNC: 94 MMOL/L — LOW (ref 98–110)
CO2 SERPL-SCNC: 14 MMOL/L — LOW (ref 17–32)
CREAT SERPL-MCNC: 1.6 MG/DL — HIGH (ref 0.7–1.5)
EOSINOPHIL # BLD AUTO: 0.1 K/UL — SIGNIFICANT CHANGE UP (ref 0–0.7)
EOSINOPHIL NFR BLD AUTO: 1.1 % — SIGNIFICANT CHANGE UP (ref 0–8)
GAS PNL BLDV: 127 MMOL/L — LOW (ref 136–145)
GAS PNL BLDV: SIGNIFICANT CHANGE UP
GAS PNL BLDV: SIGNIFICANT CHANGE UP
GLUCOSE SERPL-MCNC: 476 MG/DL — CRITICAL HIGH (ref 70–99)
HCO3 BLDV-SCNC: 18 MMOL/L — LOW (ref 22–29)
HCT VFR BLD CALC: 17.8 %
HCT VFR BLD CALC: 18 % — LOW (ref 37–47)
HGB BLD-MCNC: 6.1 G/DL
HGB BLD-MCNC: 6.1 G/DL — CRITICAL LOW (ref 12–16)
IMM GRANULOCYTES NFR BLD AUTO: 1.2 % — HIGH (ref 0.1–0.3)
LACTATE BLDV-MCNC: 4.8 MMOL/L — CRITICAL HIGH (ref 0.5–2)
LYMPHOCYTES # BLD AUTO: 1.3 K/UL — SIGNIFICANT CHANGE UP (ref 1.2–3.4)
LYMPHOCYTES # BLD AUTO: 13.7 % — LOW (ref 20.5–51.1)
MCHC RBC-ENTMCNC: 32.6 PG
MCHC RBC-ENTMCNC: 32.8 PG — HIGH (ref 27–31)
MCHC RBC-ENTMCNC: 33.9 G/DL — SIGNIFICANT CHANGE UP (ref 32–37)
MCHC RBC-ENTMCNC: 34.3 G/DL
MCV RBC AUTO: 95.2 FL
MCV RBC AUTO: 96.7 FL — SIGNIFICANT CHANGE UP (ref 81–99)
MONOCYTES # BLD AUTO: 0.97 K/UL — HIGH (ref 0.1–0.6)
MONOCYTES NFR BLD AUTO: 10.2 % — HIGH (ref 1.7–9.3)
NEUTROPHILS # BLD AUTO: 6.9 K/UL — HIGH (ref 1.4–6.5)
NEUTROPHILS NFR BLD AUTO: 72.5 % — SIGNIFICANT CHANGE UP (ref 42.2–75.2)
NRBC # BLD: 0 /100 WBCS — SIGNIFICANT CHANGE UP (ref 0–0)
PCO2 BLDV: 38 MMHG — LOW (ref 39–42)
PH BLDV: 7.28 — LOW (ref 7.32–7.43)
PLATELET # BLD AUTO: 725 K/UL
PLATELET # BLD AUTO: 726 K/UL — HIGH (ref 130–400)
PMV BLD: 10.3 FL
PO2 BLDV: 26 MMHG — SIGNIFICANT CHANGE UP
POTASSIUM BLDV-SCNC: 5.3 MMOL/L — HIGH (ref 3.5–5.1)
POTASSIUM SERPL-MCNC: 6 MMOL/L — CRITICAL HIGH (ref 3.5–5)
POTASSIUM SERPL-SCNC: 6 MMOL/L — CRITICAL HIGH (ref 3.5–5)
PROT SERPL-MCNC: 6 G/DL — SIGNIFICANT CHANGE UP (ref 6–8)
RBC # BLD: 1.83 M/UL — LOW (ref 4.2–5.4)
RBC # BLD: 1.87 M/UL
RBC # FLD: 22.7 % — HIGH (ref 11.5–14.5)
RBC # FLD: 22.8 %
SARS-COV-2 RNA SPEC QL NAA+PROBE: SIGNIFICANT CHANGE UP
SODIUM SERPL-SCNC: 127 MMOL/L — LOW (ref 135–146)
WBC # BLD: 9.5 K/UL — SIGNIFICANT CHANGE UP (ref 4.8–10.8)
WBC # FLD AUTO: 11.45 K/UL
WBC # FLD AUTO: 9.5 K/UL — SIGNIFICANT CHANGE UP (ref 4.8–10.8)

## 2021-11-18 PROCEDURE — 99283 EMERGENCY DEPT VISIT LOW MDM: CPT

## 2021-11-18 PROCEDURE — 93010 ELECTROCARDIOGRAM REPORT: CPT

## 2021-11-18 PROCEDURE — 99215 OFFICE O/P EST HI 40 MIN: CPT

## 2021-11-18 RX ORDER — SODIUM CHLORIDE 9 MG/ML
1000 INJECTION INTRAMUSCULAR; INTRAVENOUS; SUBCUTANEOUS ONCE
Refills: 0 | Status: DISCONTINUED | OUTPATIENT
Start: 2021-11-18 | End: 2021-11-18

## 2021-11-18 RX ORDER — SODIUM CHLORIDE 9 MG/ML
1000 INJECTION, SOLUTION INTRAVENOUS ONCE
Refills: 0 | Status: COMPLETED | OUTPATIENT
Start: 2021-11-18 | End: 2021-11-18

## 2021-11-18 RX ORDER — CALCIUM GLUCONATE 100 MG/ML
2 VIAL (ML) INTRAVENOUS ONCE
Refills: 0 | Status: COMPLETED | OUTPATIENT
Start: 2021-11-18 | End: 2021-11-18

## 2021-11-18 RX ORDER — ERYTHROPOIETIN 10000 [IU]/ML
80000 INJECTION, SOLUTION INTRAVENOUS; SUBCUTANEOUS ONCE
Refills: 0 | Status: COMPLETED | OUTPATIENT
Start: 2021-11-18 | End: 2021-11-18

## 2021-11-18 RX ORDER — LANOLIN ALCOHOL/MO/W.PET/CERES
3 CREAM (GRAM) TOPICAL AT BEDTIME
Refills: 0 | Status: DISCONTINUED | OUTPATIENT
Start: 2021-11-18 | End: 2021-11-20

## 2021-11-18 RX ORDER — INSULIN HUMAN 100 [IU]/ML
10 INJECTION, SOLUTION SUBCUTANEOUS ONCE
Refills: 0 | Status: COMPLETED | OUTPATIENT
Start: 2021-11-18 | End: 2021-11-18

## 2021-11-18 RX ORDER — PREGABALIN 225 MG/1
1000 CAPSULE ORAL ONCE
Refills: 0 | Status: COMPLETED | OUTPATIENT
Start: 2021-11-18 | End: 2021-11-18

## 2021-11-18 RX ORDER — ACETAMINOPHEN 500 MG
650 TABLET ORAL EVERY 6 HOURS
Refills: 0 | Status: DISCONTINUED | OUTPATIENT
Start: 2021-11-18 | End: 2021-11-20

## 2021-11-18 RX ORDER — ALBUTEROL 90 UG/1
2.5 AEROSOL, METERED ORAL ONCE
Refills: 0 | Status: COMPLETED | OUTPATIENT
Start: 2021-11-18 | End: 2021-11-18

## 2021-11-18 RX ORDER — CHLORHEXIDINE GLUCONATE 213 G/1000ML
1 SOLUTION TOPICAL
Refills: 0 | Status: DISCONTINUED | OUTPATIENT
Start: 2021-11-18 | End: 2021-11-20

## 2021-11-18 RX ORDER — ONDANSETRON 8 MG/1
4 TABLET, FILM COATED ORAL EVERY 8 HOURS
Refills: 0 | Status: DISCONTINUED | OUTPATIENT
Start: 2021-11-18 | End: 2021-11-20

## 2021-11-18 RX ADMIN — SODIUM CHLORIDE 1000 MILLILITER(S): 9 INJECTION, SOLUTION INTRAVENOUS at 22:07

## 2021-11-18 RX ADMIN — PREGABALIN 1000 MICROGRAM(S): 225 CAPSULE ORAL at 15:49

## 2021-11-18 RX ADMIN — INSULIN HUMAN 10 UNIT(S): 100 INJECTION, SOLUTION SUBCUTANEOUS at 22:04

## 2021-11-18 RX ADMIN — ERYTHROPOIETIN 80000 UNIT(S): 10000 INJECTION, SOLUTION INTRAVENOUS; SUBCUTANEOUS at 15:48

## 2021-11-18 RX ADMIN — ALBUTEROL 2.5 MILLIGRAM(S): 90 AEROSOL, METERED ORAL at 23:11

## 2021-11-18 RX ADMIN — Medication 100 GRAM(S): at 22:07

## 2021-11-18 NOTE — ED PROVIDER NOTE - CLINICAL SUMMARY MEDICAL DECISION MAKING FREE TEXT BOX
76yoF with h/o MDS, DM on metformin, presents from Dr. Wade sent in for 1u PRBC transfusion for anemia 2/2 MDS, on paperwork I reviewed with pt. Pt reports she has no symptoms including CP, SOB, dizziness, fever, black or bloody stool, dysuria. On exam, afebrile, hemodynamically stable, saturating well, NAD, well appearing, sitting comfortably in bed, no WOB, speaking full sentences, head NCAT, EOMI grossly, anicteric, MMM, no JVD, RRR, nml S1/S2, no m/r/g, lungs CTAB, no w/r/r, abd soft, NT, ND, nml BS, no rebound or guarding, AAO, CN's 3-12 grossly intact, SEVERINO spontaneously, no leg cyanosis or edema, skin warm, well perfused, no rashes or hives. Confirmed anemia, no active source of bleeding. Also noted anion gap metabolic acidosis with concern for DKA. Given fluids and insulin with improvement. Will transfuse. Patient is well appearing, NAD, afebrile, hemodynamically stable. Admitted to SDU.

## 2021-11-18 NOTE — ED PROVIDER NOTE - OBJECTIVE STATEMENT
75 yo female, pmh of dm, MDS followed by heme/onc [facility], sent in for transfusion, hgb 6.1 outpt, no specific sxs, pain, or radiation. denies fever, chills, cp, sob, abd pain, nvd, dark or bloody stools.

## 2021-11-18 NOTE — ED PROVIDER NOTE - CARE PLAN
1 Principal Discharge DX:	Anemia  Secondary Diagnosis:	DKA (diabetic ketoacidosis)  Secondary Diagnosis:	Dehydration

## 2021-11-18 NOTE — ED PROVIDER NOTE - PHYSICAL EXAMINATION
Physical Exam    Vital Signs: I have reviewed the initial vital signs.  Constitutional: well-nourished, appears stated age, no acute distress  Eyes: Conjunctiva pink, Sclera clear  Cardiovascular: S1 and S2, regular rate, regular rhythm, well-perfused extremities, radial pulses equal and 2+  Respiratory: unlabored respiratory effort, clear to auscultation bilaterally no wheezing, rales and rhonchi  Gastrointestinal: soft, non-tender abdomen, no pulsatile mass, normal bowl sounds  Musculoskeletal: supple neck, no lower extremity edema, no midline tenderness  Integumentary: warm, dry, no rash  Neurologic: awake, alert,  nvi

## 2021-11-19 LAB
A1C WITH ESTIMATED AVERAGE GLUCOSE RESULT: 8.6 % — HIGH (ref 4–5.6)
ALBUMIN SERPL ELPH-MCNC: 4.4 G/DL — SIGNIFICANT CHANGE UP (ref 3.5–5.2)
ALP SERPL-CCNC: 104 U/L — SIGNIFICANT CHANGE UP (ref 30–115)
ALT FLD-CCNC: 10 U/L — SIGNIFICANT CHANGE UP (ref 0–41)
ANION GAP SERPL CALC-SCNC: 15 MMOL/L — HIGH (ref 7–14)
AST SERPL-CCNC: 7 U/L — SIGNIFICANT CHANGE UP (ref 0–41)
BASOPHILS # BLD AUTO: 0.15 K/UL — SIGNIFICANT CHANGE UP (ref 0–0.2)
BASOPHILS NFR BLD AUTO: 1.6 % — HIGH (ref 0–1)
BILIRUB SERPL-MCNC: 0.9 MG/DL — SIGNIFICANT CHANGE UP (ref 0.2–1.2)
BUN SERPL-MCNC: 32 MG/DL — HIGH (ref 10–20)
CALCIUM SERPL-MCNC: 9.5 MG/DL — SIGNIFICANT CHANGE UP (ref 8.5–10.1)
CHLORIDE SERPL-SCNC: 105 MMOL/L — SIGNIFICANT CHANGE UP (ref 98–110)
CO2 SERPL-SCNC: 16 MMOL/L — LOW (ref 17–32)
COVID-19 NUCLEOCAPSID GAM AB INTERP: NEGATIVE — SIGNIFICANT CHANGE UP
COVID-19 NUCLEOCAPSID TOTAL GAM ANTIBODY RESULT: 0.24 INDEX — SIGNIFICANT CHANGE UP
COVID-19 SPIKE DOMAIN AB INTERP: POSITIVE
COVID-19 SPIKE DOMAIN ANTIBODY RESULT: 65.2 U/ML — HIGH
CREAT SERPL-MCNC: 1.5 MG/DL — SIGNIFICANT CHANGE UP (ref 0.7–1.5)
EOSINOPHIL # BLD AUTO: 0.17 K/UL — SIGNIFICANT CHANGE UP (ref 0–0.7)
EOSINOPHIL NFR BLD AUTO: 1.8 % — SIGNIFICANT CHANGE UP (ref 0–8)
ESTIMATED AVERAGE GLUCOSE: 200 MG/DL — HIGH (ref 68–114)
GLUCOSE BLDC GLUCOMTR-MCNC: 154 MG/DL — HIGH (ref 70–99)
GLUCOSE BLDC GLUCOMTR-MCNC: 162 MG/DL — HIGH (ref 70–99)
GLUCOSE BLDC GLUCOMTR-MCNC: 181 MG/DL — HIGH (ref 70–99)
GLUCOSE BLDC GLUCOMTR-MCNC: 193 MG/DL — HIGH (ref 70–99)
GLUCOSE BLDC GLUCOMTR-MCNC: 234 MG/DL — HIGH (ref 70–99)
GLUCOSE BLDC GLUCOMTR-MCNC: 243 MG/DL — HIGH (ref 70–99)
GLUCOSE SERPL-MCNC: 171 MG/DL — HIGH (ref 70–99)
HCT VFR BLD CALC: 24.9 % — LOW (ref 37–47)
HGB BLD-MCNC: 8.5 G/DL — LOW (ref 12–16)
IMM GRANULOCYTES NFR BLD AUTO: 1.7 % — HIGH (ref 0.1–0.3)
LYMPHOCYTES # BLD AUTO: 1.16 K/UL — LOW (ref 1.2–3.4)
LYMPHOCYTES # BLD AUTO: 12.5 % — LOW (ref 20.5–51.1)
MCHC RBC-ENTMCNC: 31 PG — SIGNIFICANT CHANGE UP (ref 27–31)
MCHC RBC-ENTMCNC: 34.1 G/DL — SIGNIFICANT CHANGE UP (ref 32–37)
MCV RBC AUTO: 90.9 FL — SIGNIFICANT CHANGE UP (ref 81–99)
MONOCYTES # BLD AUTO: 0.89 K/UL — HIGH (ref 0.1–0.6)
MONOCYTES NFR BLD AUTO: 9.6 % — HIGH (ref 1.7–9.3)
NEUTROPHILS # BLD AUTO: 6.74 K/UL — HIGH (ref 1.4–6.5)
NEUTROPHILS NFR BLD AUTO: 72.8 % — SIGNIFICANT CHANGE UP (ref 42.2–75.2)
NRBC # BLD: 0 /100 WBCS — SIGNIFICANT CHANGE UP (ref 0–0)
PLATELET # BLD AUTO: 684 K/UL — HIGH (ref 130–400)
POTASSIUM SERPL-MCNC: 4.9 MMOL/L — SIGNIFICANT CHANGE UP (ref 3.5–5)
POTASSIUM SERPL-SCNC: 4.9 MMOL/L — SIGNIFICANT CHANGE UP (ref 3.5–5)
PROT SERPL-MCNC: 6 G/DL — SIGNIFICANT CHANGE UP (ref 6–8)
RBC # BLD: 2.74 M/UL — LOW (ref 4.2–5.4)
RBC # FLD: 20 % — HIGH (ref 11.5–14.5)
SARS-COV-2 IGG+IGM SERPL QL IA: 0.24 INDEX — SIGNIFICANT CHANGE UP
SARS-COV-2 IGG+IGM SERPL QL IA: 65.2 U/ML — HIGH
SARS-COV-2 IGG+IGM SERPL QL IA: NEGATIVE — SIGNIFICANT CHANGE UP
SARS-COV-2 IGG+IGM SERPL QL IA: POSITIVE
SODIUM SERPL-SCNC: 136 MMOL/L — SIGNIFICANT CHANGE UP (ref 135–146)
WBC # BLD: 9.27 K/UL — SIGNIFICANT CHANGE UP (ref 4.8–10.8)
WBC # FLD AUTO: 9.27 K/UL — SIGNIFICANT CHANGE UP (ref 4.8–10.8)

## 2021-11-19 PROCEDURE — 99221 1ST HOSP IP/OBS SF/LOW 40: CPT | Mod: GC

## 2021-11-19 RX ORDER — DEXTROSE 50 % IN WATER 50 %
25 SYRINGE (ML) INTRAVENOUS ONCE
Refills: 0 | Status: DISCONTINUED | OUTPATIENT
Start: 2021-11-19 | End: 2021-11-20

## 2021-11-19 RX ORDER — INSULIN LISPRO 100/ML
VIAL (ML) SUBCUTANEOUS
Refills: 0 | Status: DISCONTINUED | OUTPATIENT
Start: 2021-11-19 | End: 2021-11-20

## 2021-11-19 RX ORDER — DEXTROSE 50 % IN WATER 50 %
15 SYRINGE (ML) INTRAVENOUS ONCE
Refills: 0 | Status: DISCONTINUED | OUTPATIENT
Start: 2021-11-19 | End: 2021-11-20

## 2021-11-19 RX ORDER — DEXTROSE 50 % IN WATER 50 %
12.5 SYRINGE (ML) INTRAVENOUS ONCE
Refills: 0 | Status: DISCONTINUED | OUTPATIENT
Start: 2021-11-19 | End: 2021-11-20

## 2021-11-19 RX ORDER — GLUCAGON INJECTION, SOLUTION 0.5 MG/.1ML
1 INJECTION, SOLUTION SUBCUTANEOUS ONCE
Refills: 0 | Status: DISCONTINUED | OUTPATIENT
Start: 2021-11-19 | End: 2021-11-20

## 2021-11-19 RX ORDER — INSULIN GLARGINE 100 [IU]/ML
12 INJECTION, SOLUTION SUBCUTANEOUS AT BEDTIME
Refills: 0 | Status: DISCONTINUED | OUTPATIENT
Start: 2021-11-19 | End: 2021-11-20

## 2021-11-19 RX ORDER — PANTOPRAZOLE SODIUM 20 MG/1
40 TABLET, DELAYED RELEASE ORAL
Refills: 0 | Status: DISCONTINUED | OUTPATIENT
Start: 2021-11-19 | End: 2021-11-20

## 2021-11-19 RX ORDER — QUINAPRIL HYDROCHLORIDE 40 MG/1
2 TABLET, FILM COATED ORAL
Qty: 0 | Refills: 0 | DISCHARGE

## 2021-11-19 RX ORDER — ASPIRIN/CALCIUM CARB/MAGNESIUM 324 MG
1 TABLET ORAL
Qty: 0 | Refills: 0 | DISCHARGE

## 2021-11-19 RX ORDER — SODIUM CHLORIDE 9 MG/ML
1000 INJECTION, SOLUTION INTRAVENOUS
Refills: 0 | Status: DISCONTINUED | OUTPATIENT
Start: 2021-11-19 | End: 2021-11-20

## 2021-11-19 RX ORDER — LENALIDOMIDE 5 MG/1
1 CAPSULE ORAL
Qty: 0 | Refills: 0 | DISCHARGE

## 2021-11-19 RX ORDER — DIPHENOXYLATE HCL/ATROPINE 2.5-.025MG
1 TABLET ORAL
Qty: 0 | Refills: 0 | DISCHARGE

## 2021-11-19 RX ORDER — GLIMEPIRIDE 1 MG
1 TABLET ORAL
Qty: 0 | Refills: 0 | DISCHARGE

## 2021-11-19 RX ORDER — INSULIN LISPRO 100/ML
10 VIAL (ML) SUBCUTANEOUS ONCE
Refills: 0 | Status: COMPLETED | OUTPATIENT
Start: 2021-11-19 | End: 2021-11-19

## 2021-11-19 RX ORDER — ENOXAPARIN SODIUM 100 MG/ML
40 INJECTION SUBCUTANEOUS DAILY
Refills: 0 | Status: DISCONTINUED | OUTPATIENT
Start: 2021-11-19 | End: 2021-11-20

## 2021-11-19 RX ORDER — INSULIN LISPRO 100/ML
4 VIAL (ML) SUBCUTANEOUS
Refills: 0 | Status: DISCONTINUED | OUTPATIENT
Start: 2021-11-19 | End: 2021-11-20

## 2021-11-19 RX ADMIN — INSULIN HUMAN 10 UNIT(S): 100 INJECTION, SOLUTION SUBCUTANEOUS at 01:53

## 2021-11-19 RX ADMIN — Medication 2: at 21:30

## 2021-11-19 RX ADMIN — SODIUM CHLORIDE 1000 MILLILITER(S): 9 INJECTION, SOLUTION INTRAVENOUS at 01:53

## 2021-11-19 RX ADMIN — INSULIN GLARGINE 12 UNIT(S): 100 INJECTION, SOLUTION SUBCUTANEOUS at 21:29

## 2021-11-19 RX ADMIN — Medication 10 UNIT(S): at 04:26

## 2021-11-19 RX ADMIN — Medication 4 UNIT(S): at 09:50

## 2021-11-19 RX ADMIN — ENOXAPARIN SODIUM 40 MILLIGRAM(S): 100 INJECTION SUBCUTANEOUS at 16:44

## 2021-11-19 RX ADMIN — Medication 2 GRAM(S): at 00:32

## 2021-11-19 RX ADMIN — Medication 2: at 16:43

## 2021-11-19 RX ADMIN — Medication 4 UNIT(S): at 16:44

## 2021-11-19 RX ADMIN — Medication 4: at 09:49

## 2021-11-19 RX ADMIN — PANTOPRAZOLE SODIUM 40 MILLIGRAM(S): 20 TABLET, DELAYED RELEASE ORAL at 09:50

## 2021-11-19 NOTE — H&P ADULT - ASSESSMENT
75 yo F with h/o MDS, DM on metformin, presents from Dr. Wade sent in for 1u PRBC transfusion for anemia 2/2 MDS.     Denies CP, SOB, dizziness, fever, black or bloody stool, dysuria.     In the ED, Temp 99.6, , /61, saturating 100% on RA. Labs shows Hb of 6.1, K of 6,  . Anion gap metabolic acidosis. S/P  fluids and insulin in the ED with improvement.       #Acute on Chronic Macrocyctic anemia d/t MDS   - Hb baseline 7-8, 6.1 on admission   - s/p 1 unit PRBC in the ED  - transfuse if Hb <7  - no active bleed      #T2DM  - Hyperglycemia (476)  on admission with anion gap metabolic acidosis   - Betahydroxybutyrate <0.2  - started Insulin regimen   -f/u HbA1c    #Hyperkalemia   K 6.1  s/p insulin  Monitor       #HTN  - cont home meds       #Activiy: OOBC  #Diet; carb consistent  #DVT/GI PPX: Lovenox/protonix  #Code: Full       75 yo F with h/o MDS, DM on metformin, presents from Dr. Wade sent in for 1u PRBC transfusion for anemia 2/2 MDS.     Denies CP, SOB, dizziness, fever, black or bloody stool, dysuria.     In the ED, Temp 99.6, , /61, saturating 100% on RA. Labs shows Hb of 6.1, K of 6,  . Anion gap metabolic acidosis. S/P  fluids and insulin in the ED with improvement.       #Acute on Chronic Macrocyctic anemia d/t MDS   - Hb baseline 7 - 8, 6.1 on admission   - s/p 1 unit PRBC in the ED  - transfuse if Hb <7  - no active bleed  -Procrit weekly       #T2DM  - Hyperglycemia (476)  on admission with anion gap metabolic acidosis   - Betahydroxybutyrate <0.2  - started Insulin regimen   -f/u HbA1c      #Hyperkalemia   K 6.1  s/p insulin  Monitor   Low K diet      #Elevated BP   Monitor   not on hypertension meds at home        #Activiy: OOBC  #Diet; carb consistent  #DVT/GI PPX: Lovenox/protonix  #Code: Full

## 2021-11-19 NOTE — PHYSICAL EXAM
[Restricted in physically strenuous activity but ambulatory and able to carry out work of a light or sedentary nature] : Status 1- Restricted in physically strenuous activity but ambulatory and able to carry out work of a light or sedentary nature, e.g., light house work, office work [Normal] : affect appropriate [de-identified] : pallor +

## 2021-11-19 NOTE — H&P ADULT - ATTENDING COMMENTS
HPI:  75 yo F with h/o MDS, DM on metformin, presents from Dr. Wade sent in for 1u PRBC transfusion for anemia 2/2 MDS.     Denies CP, SOB, dizziness, fever, black or bloody stool, dysuria.     In the ED,Temp 99.6, , /61, saturating 100% on RA. Labs shows Hb of 6.1, K of 6,  . Anion gap metabolic acidosis. S/P  fluids and insulin in the ED with improvement.  (19 Nov 2021 01:51)    REVIEW OF SYSTEMS: see cc/HPI   CONSTITUTIONAL: No weakness, fevers or chills  EYES/ENT: No visual changes;  No vertigo or throat pain   NECK: No pain or stiffness  RESPIRATORY: No cough, wheezing, hemoptysis; No shortness of breath  CARDIOVASCULAR: No chest pain or palpitations  GASTROINTESTINAL: No abdominal or epigastric pain. No nausea, vomiting, or hematemesis; No diarrhea or constipation. No melena or hematochezia.  GENITOURINARY: No dysuria, frequency or hematuria  NEUROLOGICAL: No numbness or weakness  SKIN: No itching, rashes    Physical Exam:  General: WN/WD NAD  Neurology: A&Ox3, nonfocal, follows commands  Eyes: PERRLA/ EOMI  ENT/Neck: Neck supple, trachea midline, No JVD  Respiratory: CTA B/L, No wheezing, rales, rhonchi  CV: Normal rate regular rhythm, S1S2, no murmurs, rubs or gallops  Abdominal: Soft, NT, ND +BS,   Extremities: No edema, + peripheral pulses  Skin: No Rashes, Hematoma, Ecchymosis  Incisions:   Tubes: HPI:  77 yo F with h/o MDS, DM on metformin, presents from Dr. Wade sent in for 1u PRBC transfusion for anemia 2/2 MDS.     Denies CP, SOB, dizziness, fever, black or bloody stool, dysuria.     In the ED,Temp 99.6, , /61, saturating 100% on RA. Labs shows Hb of 6.1, K of 6,  . Anion gap metabolic acidosis. S/P  fluids and insulin in the ED with improvement.  (19 Nov 2021 01:51)    REVIEW OF SYSTEMS: see cc/HPI   CONSTITUTIONAL: No weakness, fevers or chills  EYES/ENT: No visual changes;  No vertigo or throat pain   NECK: No pain or stiffness  RESPIRATORY: No cough, wheezing, hemoptysis; No shortness of breath  CARDIOVASCULAR: No chest pain or palpitations  GASTROINTESTINAL: No abdominal or epigastric pain. No nausea, vomiting, or hematemesis; No diarrhea or constipation. No melena or hematochezia.  GENITOURINARY: No dysuria, frequency or hematuria  NEUROLOGICAL: No numbness or weakness  SKIN: No itching, rashes    Physical Exam:  General: WN/WD NAD  Neurology: A&Ox3, nonfocal, follows commands  Eyes: PERRLA/ EOMI  ENT/Neck: Neck supple, trachea midline, No JVD  Respiratory: CTA B/L, No wheezing, rales, rhonchi  CV: Normal rate regular rhythm, S1S2, no murmurs, rubs or gallops  Abdominal: Soft, NT, ND +BS,   Extremities: No edema, + peripheral pulses  Skin: No Rashes, Hematoma, Ecchymosis  Incisions: n/a  Tubes: n/a    A/p  Acute on chronic anemia 2/2 MDS  -transfuse PRBC   -serial CBC   -Procrit   -Hematology eval    DM type II - HAGMA   - Insulin and IV fluids   -HgA1c  -repeat BMP     Hyperkalemia   - hold ACEI for now     Elevated BP w/o hypertension   -monitor and is persistently elevated ---> Amlodipine     DVT prophylaxis

## 2021-11-19 NOTE — REVIEW OF SYSTEMS
[Fatigue] : fatigue [Recent Change In Weight] : ~T recent weight change [Diarrhea] : diarrhea [Negative] : Allergic/Immunologic [Shortness Of Breath] : no shortness of breath [FreeTextEntry2] : weight loss 10 lbs in the last 4 months [FreeTextEntry7] : watery diarrhea

## 2021-11-19 NOTE — H&P ADULT - NSHPPHYSICALEXAM_GEN_ALL_CORE
GENERAL: NAD, speaks in full sentences, no signs of respiratory distress  HEAD:  Atraumatic, Normocephalic  EYES: EOMI, PERRLA, conjunctiva and sclera clear  NECK: Supple, No JVD  CHEST/LUNG: Clear to auscultation bilaterally; No wheeze; No crackles; No accessory muscles used  HEART: Regular rate and rhythm; No murmurs;   ABDOMEN: Soft, Nontender, Nondistended; Bowel sounds present; No guarding  EXTREMITIES:  2+ Peripheral Pulses, No cyanosis or edema  PSYCH: AAOx3  NEUROLOGY: non-focal  SKIN: No rashes or lesions GENERAL: NAD, speaks in full sentences, no signs of respiratory distress  HEAD:  Atraumatic, Normocephalic  EYES: EOMI, PERRLA, conjunctiva and sclera clear  NECK: Supple, No JVD  CHEST/LUNG: Clear to auscultation bilaterally; No wheeze; No crackles; No accessory muscles used  HEART: Regular rate and rhythm; No murmurs;   ABDOMEN: Soft, Nontender, Nondistended; Bowel sounds present; No guarding  EXTREMITIES:  2+ Peripheral Pulses, No cyanosis or edema  PSYCH: AAOx3  NEUROLOGY: non-focal

## 2021-11-19 NOTE — H&P ADULT - NSHPLABSRESULTS_GEN_ALL_CORE
6.1    9.50  )-----------( 726      ( 2021 19:38 )             18.0       11-18    127<L>  |  94<L>  |  33<H>  ----------------------------<  476<HH>  6.0<HH>   |  14<L>  |  1.6<H>    Ca    9.3      2021 19:38    TPro  6.0  /  Alb  4.2  /  TBili  0.4  /  DBili  x   /  AST  8   /  ALT  10  /  AlkPhos  107  11-18              Urinalysis Basic - ( 2021 20:56 )    Color: Light Yellow / Appearance: Clear / S.016 / pH: x  Gluc: x / Ketone: Negative  / Bili: Negative / Urobili: <2 mg/dL   Blood: x / Protein: 30 mg/dL / Nitrite: Negative   Leuk Esterase: Negative / RBC: 1 /HPF / WBC 2 /HPF   Sq Epi: x / Non Sq Epi: 2 /HPF / Bacteria: Negative            Lactate Trend            CAPILLARY BLOOD GLUCOSE      POCT Blood Glucose.: 428 mg/dL (2021 23:16)

## 2021-11-19 NOTE — HISTORY OF PRESENT ILLNESS
[de-identified] : She missed on appointment for procrit last week.\par She starts her next cycle on 6/25/18\par C/o back pain radiating down her left which occurred when she bent to  something.\par No change in diarrhea.\par \par 7/31/18:\par Doing well. On Revlimid for more than 2yrs, 5 mg 2 weeks on 1 week off. She will be starting week on tonight. \par C/o diarrhea. On Imodium 2 pills AM and 2 pills PM. Doesn't help much with diarrhea. \par C/o nausea, abdominal pain. \par \par \par Colonoscopy in 2016 was normal. \par Did not have blood transfusion for over 2 years. \par On procrit [id number] weekly. Missed last week on 7/24/18 as she was out of town. She has been non compliant with weekly Procrit.\par Mammogram in 2017 was normal. \par \par 9/11/18\par pt is here for follow up.\par She feels the lomotil helps with the diarrhea.\par She was away for a few weeks and missed her procrit injections.\par No fever, abdominal  discomfort has been less since since use of lomotil.\par She started a new cycle 2 days ago.\par \par 10/2/18:\par She will start Revlimid tonight (week on). 2 weeks on, 1 week off. \par On ASA 81mg. \par Denies fever, nausea, vomiting, chest pain, SOB, abdominal pain, and bladder problems.\par C/o diarrhea. \par S/p 2 units PRBC transfusion on 9/25/18. \par On Procrit 94265 units weekly. Not compliant every week. \par C/o intermittent dizziness. \par \par 10/31/18\par Pt is here for follow up.\par C/O significant fatigue.\par Continues to have diarrhea, takes imodium and lomotil as needed.\par C/o dry cough, no fever.\par Cough started a month ago. It is worse in the mornings however over last week it has been constant all day.\par No chest pain.\par She was found to have low B12 levels and was started on B12 injections.\par \par 11/27/18:\par Doing well. Hospitalized for 3 days for cellulitis/abcess of the back s/p drainage and on Abx currently. Developed 3 days after Mg infusion as per pt. \par Denies nausea, vomiting, chest pain, SOB, abdominal pain, bowel and bladder problems.\par This is the week on Revlimid (week 1).\par S/p 1 unit PRBC transfusion in the hospital. \par On Procrit weekly \par \par 12/27/18:\par Doing well. No major complaints.\par Denies fever, nausea, vomiting, chest pain, SOB, abdominal pain, and bladder problems.\par On Revlimid 5 mg 2 weeks on 1 week off. This is the week off. She will start the week on sunday (12/30/18).\par Due for Procrit 02396 units today. \par Still has diarrhea. 4-5bm/day.\par On monthly B12 injections. \par \par 1/30/19:\par Patient here for follow up for MDS.  She is taking Revlimid 5 mg, 2 weeks on, 1 week off.  She will complete this current cycle on Saturday.  She has no new complaints today.  Patient denies cough, shortness of breath, denies fever, denies bone pain.\par \par 03/04/2019\par Patient is here for a follow up visit. She complaints of severe pain in her left shoulder and has had abscess drained 2 weeks ago. However the pain is worse and she has purulent discharge on examination. She also has Nasal sores that are painful. Culture from 11/2018 showed MRSA.  Denies Fever. \par She also complaints of swelling in her right leg. Not tender and not erythematous and negative Gong;s sign. \par Her diarrhea with Revlimid is ongoing and Imodium and Lomotil helps but not everyday. \par She is on 60,000 units of procrit weekly and Revlimid 5 mg 2 weeks on 1 week off. \par \par 4/23/19\par Pt is here for follow up.\par She feels well.\par The nasal sores have improved with bactroban\par The abscess on left shoulder has resolved.\par However she has a new one on the middle of her back.\par No fever.\par Pt has been on procrit 13130 units weekly, however she missed a dose in between.\par \par 5/8/19\par pt is here for follow up.\par no new complaints.\par feels well.\par Her skin abscess has resolved.\par she has been unable to go to ID, as she could not get an appt.\par No bleeding.\par She is compliant with revlimid.\par \par 6/6/19\par Pt is here for follow up.\par no new complaints \par Feels well.\par Is on week 2 of her Revlimid cycle. \par No transfusions since last visit\par \par 7/17/19\par Pt is here for follow up.\par C/O fatigue.\par Was away for a few days two weeks ago, but missed treatment with procrit for 2 weeks.\par Is complaint with Revlimid 2 weeks on 1 week off.\par Diarrhea is a little improved.\par \par 8/14/19\par Patient here for follow up visit, feeling well.  Although she is complaining of some pain at the left scapula area recently.  Patient denies cough, shortness of breath, denies fever, denies other bone pain.  She is off Revlimid  this week, due to restart on Monday.  She is scheduled for Procrit and Vitamin B12 injection today.  She plans to leave the country tomorrow to visit her mother who is ill.  She will return in about 10 days.\par \par 9/11/19\par Pt is here for follow up.\par She was away for 3 weeks, out of which she took procrit for 2 weeks in [place].\par She missed last week's dose.\par She had CBCs in [place] which showed Hgb of 8.9\par She feels well.\par She still getting diarrhea.\par She has been using lomotil with very minimal relief.\par She started a new cycle of Revlimid 4 days ago.\par \par 10/3/19\par Patient here for follow up visit, feeling fairly well.   Patient denies cough, shortness of breath, denies fever, denies other bone pain.  She remains on Revlimid.  She is scheduled for Procrit and Vitamin B12 injection today.\par \par 10/24/19\par Pt is here for follow up.\par Feels well.\par No SOB, fatigue.\par Compliant with procrit and Revl;imid.\par Remains on ASa.\par Diarrhea is unchanged.\par Pt takes imodium as needed.\par \par 11/14/19\par Pt is here for follow up.\par No new complaints.\par Starts a new cycle of Revlimid today.\par Diarrhea is same as before, no rectal bleeding.\par No fever.\par \par 12/5/19\par Pt is here for follow up.\par No new complaints.\par Denies feeling weaker than usual.\par No fever, SOB.\par No change in frequency of diarrhea.\par No pain.\par Will start next cycle of Revlimid in 3 days.\par \par \par !/16/20\par Pt was recently DCed from [facility] 3 days ago after being treated for pneumonia and MARCO.\par She is on po Levaquin.\par She restarted Revlmid 3 days ago.\par She is feeling better now. No fever.\par No SOB, Chest pain and back pain has resolved.\par Pt has a cough, no expectoration.\par She also recd a unit of PRBCs last week in the hospital\par \par \par 1/30/20\par Patient here for follow up visit, feeling well.  She has no new complaints. Cough is almost gone completely.  Patient denies shortness of breath, denies fever, denies bone pain.  Her appetite is ok, weight is stable.  She is due to restart Revlimid on Monday.\par \par 02/20/20\par Pt is here for follow up, feeling well.\par Offers no new complaints. Denies SOB, fever, chills, weight loss, CP, cough. \par Currently off week of Revlimid 5 mg. Restarts on Monday.\par Has procrit 80,000 units today. Next b 12 injection due in 2 weeks \par Did not get chest Xray\par CBC reviewed WBC 3.1, h/h 8.3/25%, plt 386, neutrophils 1.29\par \par 3/12/20\par Pt is here for follow up.\par She took Revlimid for 2 weeks and then has been off for one week although she was advised to take it daily without break.\par No SOB, Cough, fever.\par Has mild fatigue.\par \par 04/02/2020\par SIMONA KUHN a 75 year F is here today for follow up visit of MDS.\par Denies fever, chills, cough,night sweats, weight loss. \par Denies bleeding or bruising.\par Pt receives procrit 80,000 units weekly and B12 IM monthly. \par Continued Revlimid 5 mg daily x 3 weeks, has 1 dose left tonight. \par CBC reviewed: WBC 3.2, H/H 8.1/25.2, neutrophils 1.6, PLT 72\par \par 4/20/2020: The patient is here for follow up . She has no complaints of fever, chills, dizziness , chest pain and shortness of breath . She is still with diarrhea , up to 10 watery BMs per day, responds poorly to imodium and lomotil. She is on Revlimid 5 mg daily , took last dose yesterday night. Her last Procrit was on April 13. \par \par 5/26/20\par Pt is here for follow up.\par She is feeling well. Denies SOB, chest pain, fever.\par Continues to have diarrhea although she is off of Revlmid.\par Thinks it may due to diabetic meds.\par Wants to discuss BM bx results\par \par 6/22/20\par Pt is here for follow up.\par Feels well. Denies any fever, N, V, D\par Continues to have diarrhea, she will talk to her PCP about changing metformin for DM.\par Has been needing PRBCs 1 unit each month\par \par 7/20/20\par Pt is here for follow up.\par No new complaints. Has been feeling well.\par No SOB, CP. \par No N, V, D.\par She has recd 2 units of PRBCs since May 20.\par \par 8/17/20\par Pt is here for a follow up visit.\par She is feeling well.\par No new symptoms. No N, V, D.\par No bruising or bleeding. She continues to need PRBCs every 2-3 weeks.\par \par 8/31/20\par Pt is here for follow up. She is feeling well. No N, V, D.\par She is tolerating the hydrea well. No SOB, CP\par No bruising ior bleeding\par \par 9/8/20\par Pt is here for follow up.\par She had been contacted by the [facility] last week to advise her to stop the hydrea. Pt did not check her messages and continued with Hydrea.\par No fever, N, V, bleeding.\par Saw Dr Ferrell last week.\par \par 9/14/20\par Pt is here for folow up.\par Besides her usual complaint of diarrhea she is feeling well.\par Recd 1 unit PRBC last week.\par Has stopped Hydrea.\par No fever, mouth sores.\par \par 9/29/20\par Pt is here for folow up.\par No new complaints.\par Is seeing GI for diarrhea net week.\par No fever, night sweats.\par REcd 3 units of PRBC this month\par \par 10/13/20\par Pt is here for follow up.\par No new complaints.\par Has not needed a transfusion since 3 weeks ago.\par Continues to have diarrhea, waiting to be seen by GI\par \par 10/26/20\par Pt is here for follow up.\par C/O feeling fatigued.\par Has CLARK.\par No nausea or vomiting.\par No fever.\par Diarrhea has improved a little. She is no longer on Metformin\par \par 11/9/20\par Pt is here for follow up.\par Feels well. Denies SOB, CP, fatigue\par \par 12/7/20- Patient is for follow up and is due for cycle 9 of Vidaza.  She still has loose BM sometimes 10 times a day and was seeing Dr. Corey from GI- did not follow up recently and is unable to get an appt with him. She has lost about 10 lbs since September. No N/V. No fever or chills. No CP/SOB. She has been getting PRBC transfusion every 3 weeks now\par \par 01/04/20 Pt presented today for f/u visit . She is s/p 8 cycles of vidaza and was started on Luspatercept in Dec , 2020 . So far she received 1 injection , she is due for 2nd injection today . Adverse effect profile was discussed with her in detail , she reports having some dizziness and weakness after first injection . She received blood transfusion last wk . Blood work from today reviewed as well and counts are adequate . She denies any fevers,  chills,  or any new symptoms . \par \par 1/25/21\par Pt is here for follow up.\par C/O diarrhea, otherwise feeling better.\par Has not needed a transfusion since 12/29\par \par 2/16/21\par Pt is here for follow up.\par Needed transfusion on 2/8/21.\par Continues to C/o fatigue. Diarrhea remains the same, has not made GI appt as yet.\par \par 3/8/21\par Pt is here for follow up.\par Feels better today. Recd 1 unit PRBCs last week.\par Diarrhea is same as before. has an appt with GI next week.\par No fever\par \par 3/30/21\par Pt is here for follow up.\par Feels better. Last transfusion was on 3/2/21\par Saw GI and was started on cholestyramine and metformin was DCED with resolution of diarrhea.\par She denies any SOB,\par Fatigue is better\par \par 4/20/2021\par Pt is here to f/u for MDS\par She is s/p Luspatercept cycle #6\par She is compliant with Aspirin\par She feels better today, appetite is good\par She denies shortness of breath, fatigue, or weakness \par She c/o of diarrhea but it has improved since she was started on Cholestyramine. Stool is soft and less in frequency\par She received COVID vaccine x 2 doses\par \par 5/11/21\par pt is here for follow up.\par Feels the same with fatigue, diarrhea.\par No SOB\par \par 6/21/21\par Pt is here for follow up.\par Feels well. No SOB, CP, N< V.\par Diarrhea is better controlled now.\par Last PRBC transfusion was 2 weeks ago.\par \par 7/19/21\par Pt is here for follow up.\par Feels a little tired, last transfusion was 6/1/21.\par No bleeding, fever, SOB\par \par 8/10/2021\par Patient is here to follow up for her MDS.\par She is on Luspatercept, tolerating well.\par She feels well today, the appetite is good.\par She denies shortness of breath, fatigue, fever, night sweats, abdominal pain, arthralgias/myalgias.\par \par 8/31/21\par Pt is here for follow up.\par C/O feeling very fatigued.\par No bleeding.\par No fever.\par \par 9/21/2021\par Patient is here to follow up for MDS.\par She is on Luspatercept and Retacrit, tolerating well.\par She gets blood transfusion as needed for Hgb <7 gm/dL\par She is c/o of fatigue, no shortness of breath, dizziness, chills or lightheadedness.\par She denies melena or hematochezia.\par Her appetite is good, no nausea/vomiting.\par \par 10/28/2021\par Patient is here to follow up for MDS.\par She is on Luspatercept and Retacrit, tolerating well.\par She gets blood transfusion to keep Hgb > 7 gm/dL.\par She is c/o of chronic fatigue, no shortness of breath, dizziness, chills or lightheadedness.\par She denies melena or hematochezia.\par Her appetite is good, no nausea/vomiting.\par She c/o of severe diarrhea, 10-12x/day watery stool while on Imodium in the last 2 weeks.\par Last colonoscopy was in 2016, benign as per patient.\par \par 11/18/21\par Pt is here for follow up. C/O fatigue. No SOB, CP [de-identified] : \par

## 2021-11-19 NOTE — ASSESSMENT
[FreeTextEntry1] : Patient is a 76 year old female with # Lowrisk myelodysplastic syndrome, previously treated with Revlimid and Procrit and Vidaza . 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 \par Patient is s/p  9 cycles of Vidaza and she was requiring pRBC transfusion every 3 weeks \par Given the persistent transfusion dependence she was switched to Luspatercept.\par \par Hyperkalemia likely due to increased platelet; pseudohyperkalemia.\par \par PLAN:\par -- Continue Luspatercept 1.75 mg/kg every 3 weeks, \par -- Continue Procrit 80,000 units every week. Hgb 9.2 gm/dL\par Side effects of Procrit discussed including but not limited to: hypertension, headache, pruritus, nausea, vomiting, injection site pain, arthralgia, cough, fever.\par -- Monitor CBC weekly and administer transfusions as needed to keep Hgb >7 gm/dL.\par \par Her Hgb is low, will send to ER for 1 unit PRBC. \par \par # Vitamin  B 12 deficiency\par -- Continue Vitamin B 12 injection every month\par \par # Thrombocytosis\par -- S/P Hydrea\par -- Will continue to monitor.\par \par \par RTC in 3 weeks\par \par \par \par

## 2021-11-19 NOTE — H&P ADULT - HISTORY OF PRESENT ILLNESS
77 yo F with h/o MDS, DM on metformin, presents from Dr. Wade sent in for 1u PRBC transfusion for anemia 2/2 MDS.     Denies CP, SOB, dizziness, fever, black or bloody stool, dysuria.     In the ED,Temp 99.6, , /61, saturating 100% on RA. Labs shows Hb of 6.1, K of 6,  . Anion gap metabolic acidosis. S/P  fluids and insulin in the ED with improvement.  77 yo F with h/o MDS, DM on metformin and glimepiride presents from Dr. Wade sent in for transfusion for anemia 2/2 MDS. Pt stated that she has been feeling weak for the last 2 days. Denies any other symptoms.     Denies Chest pain, SOB, dizziness, fever, abdominal pain, black or bloody stool, dysuria.     In the ED, Temp 99.6, , /61, saturating 100% on RA. Labs shows Hb of 6.1, K of 6,  . Anion gap metabolic acidosis. S/P  fluids and insulin in the ED with improvement.     Pt endorses medication compliance and diet compliance.

## 2021-11-20 ENCOUNTER — TRANSCRIPTION ENCOUNTER (OUTPATIENT)
Age: 76
End: 2021-11-20

## 2021-11-20 VITALS
TEMPERATURE: 98 F | DIASTOLIC BLOOD PRESSURE: 63 MMHG | SYSTOLIC BLOOD PRESSURE: 136 MMHG | RESPIRATION RATE: 20 BRPM | OXYGEN SATURATION: 100 % | HEART RATE: 75 BPM

## 2021-11-20 LAB
ALBUMIN SERPL ELPH-MCNC: 4 G/DL — SIGNIFICANT CHANGE UP (ref 3.5–5.2)
ALP SERPL-CCNC: 86 U/L — SIGNIFICANT CHANGE UP (ref 30–115)
ALT FLD-CCNC: 9 U/L — SIGNIFICANT CHANGE UP (ref 0–41)
ANION GAP SERPL CALC-SCNC: 14 MMOL/L — SIGNIFICANT CHANGE UP (ref 7–14)
AST SERPL-CCNC: 9 U/L — SIGNIFICANT CHANGE UP (ref 0–41)
BASOPHILS # BLD AUTO: 0.14 K/UL — SIGNIFICANT CHANGE UP (ref 0–0.2)
BASOPHILS NFR BLD AUTO: 1.4 % — HIGH (ref 0–1)
BILIRUB SERPL-MCNC: 0.9 MG/DL — SIGNIFICANT CHANGE UP (ref 0.2–1.2)
BUN SERPL-MCNC: 38 MG/DL — HIGH (ref 10–20)
CALCIUM SERPL-MCNC: 8.8 MG/DL — SIGNIFICANT CHANGE UP (ref 8.5–10.1)
CHLORIDE SERPL-SCNC: 106 MMOL/L — SIGNIFICANT CHANGE UP (ref 98–110)
CO2 SERPL-SCNC: 16 MMOL/L — LOW (ref 17–32)
CREAT SERPL-MCNC: 1.5 MG/DL — SIGNIFICANT CHANGE UP (ref 0.7–1.5)
EOSINOPHIL # BLD AUTO: 0.27 K/UL — SIGNIFICANT CHANGE UP (ref 0–0.7)
EOSINOPHIL NFR BLD AUTO: 2.7 % — SIGNIFICANT CHANGE UP (ref 0–8)
GLUCOSE BLDC GLUCOMTR-MCNC: 141 MG/DL — HIGH (ref 70–99)
GLUCOSE BLDC GLUCOMTR-MCNC: 261 MG/DL — HIGH (ref 70–99)
GLUCOSE SERPL-MCNC: 99 MG/DL — SIGNIFICANT CHANGE UP (ref 70–99)
HCT VFR BLD CALC: 23.7 % — LOW (ref 37–47)
HGB BLD-MCNC: 8 G/DL — LOW (ref 12–16)
IMM GRANULOCYTES NFR BLD AUTO: 1.5 % — HIGH (ref 0.1–0.3)
LYMPHOCYTES # BLD AUTO: 1.32 K/UL — SIGNIFICANT CHANGE UP (ref 1.2–3.4)
LYMPHOCYTES # BLD AUTO: 13.4 % — LOW (ref 20.5–51.1)
MCHC RBC-ENTMCNC: 30.9 PG — SIGNIFICANT CHANGE UP (ref 27–31)
MCHC RBC-ENTMCNC: 33.8 G/DL — SIGNIFICANT CHANGE UP (ref 32–37)
MCV RBC AUTO: 91.5 FL — SIGNIFICANT CHANGE UP (ref 81–99)
MONOCYTES # BLD AUTO: 0.97 K/UL — HIGH (ref 0.1–0.6)
MONOCYTES NFR BLD AUTO: 9.8 % — HIGH (ref 1.7–9.3)
NEUTROPHILS # BLD AUTO: 7.01 K/UL — HIGH (ref 1.4–6.5)
NEUTROPHILS NFR BLD AUTO: 71.2 % — SIGNIFICANT CHANGE UP (ref 42.2–75.2)
NRBC # BLD: 0 /100 WBCS — SIGNIFICANT CHANGE UP (ref 0–0)
PLATELET # BLD AUTO: 640 K/UL — HIGH (ref 130–400)
POTASSIUM SERPL-MCNC: 5.1 MMOL/L — HIGH (ref 3.5–5)
POTASSIUM SERPL-SCNC: 5.1 MMOL/L — HIGH (ref 3.5–5)
PROT SERPL-MCNC: 5.5 G/DL — LOW (ref 6–8)
RBC # BLD: 2.59 M/UL — LOW (ref 4.2–5.4)
RBC # FLD: 21.5 % — HIGH (ref 11.5–14.5)
SODIUM SERPL-SCNC: 136 MMOL/L — SIGNIFICANT CHANGE UP (ref 135–146)
WBC # BLD: 9.86 K/UL — SIGNIFICANT CHANGE UP (ref 4.8–10.8)
WBC # FLD AUTO: 9.86 K/UL — SIGNIFICANT CHANGE UP (ref 4.8–10.8)

## 2021-11-20 PROCEDURE — 99222 1ST HOSP IP/OBS MODERATE 55: CPT

## 2021-11-20 PROCEDURE — 99239 HOSP IP/OBS DSCHRG MGMT >30: CPT

## 2021-11-20 RX ORDER — LOPERAMIDE HCL 2 MG
2 TABLET ORAL ONCE
Refills: 0 | Status: COMPLETED | OUTPATIENT
Start: 2021-11-20 | End: 2021-11-20

## 2021-11-20 RX ORDER — SODIUM ZIRCONIUM CYCLOSILICATE 10 G/10G
5 POWDER, FOR SUSPENSION ORAL ONCE
Refills: 0 | Status: COMPLETED | OUTPATIENT
Start: 2021-11-20 | End: 2021-11-20

## 2021-11-20 RX ADMIN — CHLORHEXIDINE GLUCONATE 1 APPLICATION(S): 213 SOLUTION TOPICAL at 05:08

## 2021-11-20 RX ADMIN — ENOXAPARIN SODIUM 40 MILLIGRAM(S): 100 INJECTION SUBCUTANEOUS at 12:09

## 2021-11-20 RX ADMIN — Medication 2 MILLIGRAM(S): at 13:58

## 2021-11-20 RX ADMIN — PANTOPRAZOLE SODIUM 40 MILLIGRAM(S): 20 TABLET, DELAYED RELEASE ORAL at 07:17

## 2021-11-20 RX ADMIN — SODIUM ZIRCONIUM CYCLOSILICATE 5 GRAM(S): 10 POWDER, FOR SUSPENSION ORAL at 13:58

## 2021-11-20 RX ADMIN — Medication 4 UNIT(S): at 08:35

## 2021-11-20 RX ADMIN — Medication 4 UNIT(S): at 12:09

## 2021-11-20 RX ADMIN — Medication 6: at 12:08

## 2021-11-20 NOTE — DISCHARGE NOTE PROVIDER - NSDCFUADDAPPT_GEN_ALL_CORE_FT
Please, with in one  week of discharge follow up with Dr. Torres  Please, with in one week of discharge follow up with Dr. Acuña   Please also follow up with your  endocrinologist for medication  adjustment

## 2021-11-20 NOTE — PROGRESS NOTE ADULT - SUBJECTIVE AND OBJECTIVE BOX
pt seen and examined.     HPI:  75 yo F with h/o MDS, DM on metformin and glimepiride presents from Dr. Wade sent in for transfusion for anemia 2/2 MDS. Pt stated that she has been feeling weak for the last 2 days. Denies any other symptoms.     Denies Chest pain, SOB, dizziness, fever, abdominal pain, black or bloody stool, dysuria.       ROS: no cp, no sob, no n/v, no fever    PAST MEDICAL & SURGICAL HISTORY:  DM (diabetes mellitus)    MDS (myelodysplastic syndrome)    No significant past surgical history        Vital Signs Last 24 Hrs  T(C): 36.4 (20 Nov 2021 08:46), Max: 37 (20 Nov 2021 00:30)  T(F): 97.5 (20 Nov 2021 08:46), Max: 98.6 (20 Nov 2021 00:30)  HR: 95 (20 Nov 2021 08:46) (78 - 95)  BP: 136/71 (20 Nov 2021 08:46) (125/60 - 136/71)  BP(mean): 89 (20 Nov 2021 08:46) (89 - 89)  RR: 18 (20 Nov 2021 08:46) (16 - 18)  SpO2: 100% (20 Nov 2021 08:46) (100% - 100%)    Physical exam:   constitutional NAD, AAOX3, Respiratory  lungs CTA, CVS heart RRR, GI: abdomen Soft NT, ND, BS+, skin: intact  neuro exam Motor, sensory and CN normal, no deficit     MEDICATIONS  (STANDING):  chlorhexidine 4% Liquid 1 Application(s) Topical <User Schedule>  dextrose 40% Gel 15 Gram(s) Oral once  dextrose 5%. 1000 milliLiter(s) (50 mL/Hr) IV Continuous <Continuous>  dextrose 5%. 1000 milliLiter(s) (100 mL/Hr) IV Continuous <Continuous>  dextrose 50% Injectable 25 Gram(s) IV Push once  dextrose 50% Injectable 12.5 Gram(s) IV Push once  dextrose 50% Injectable 25 Gram(s) IV Push once  enoxaparin Injectable 40 milliGRAM(s) SubCutaneous daily  glucagon  Injectable 1 milliGRAM(s) IntraMuscular once  insulin glargine Injectable (LANTUS) 12 Unit(s) SubCutaneous at bedtime  insulin lispro (ADMELOG) corrective regimen sliding scale   SubCutaneous Before meals and at bedtime  insulin lispro Injectable (ADMELOG) 4 Unit(s) SubCutaneous three times a day before meals  loperamide 2 milliGRAM(s) Oral once  pantoprazole    Tablet 40 milliGRAM(s) Oral before breakfast  sodium zirconium cyclosilicate 5 Gram(s) Oral once    MEDICATIONS  (PRN):  acetaminophen     Tablet .. 650 milliGRAM(s) Oral every 6 hours PRN Temp greater or equal to 38C (100.4F), Mild Pain (1 - 3)  aluminum hydroxide/magnesium hydroxide/simethicone Suspension 30 milliLiter(s) Oral every 4 hours PRN Dyspepsia  melatonin 3 milliGRAM(s) Oral at bedtime PRN Insomnia  ondansetron Injectable 4 milliGRAM(s) IV Push every 8 hours PRN Nausea and/or Vomiting      POCT Blood Glucose.: 261 mg/dL (11-20-21 @ 12:02)  POCT Blood Glucose.: 141 mg/dL (11-20-21 @ 08:31)  POCT Blood Glucose.: 162 mg/dL (11-19-21 @ 21:10)  POCT Blood Glucose.: 154 mg/dL (11-19-21 @ 15:56)    CAPILLARY BLOOD GLUCOSE      POCT Blood Glucose.: 261 mg/dL (20 Nov 2021 12:02)  POCT Blood Glucose.: 141 mg/dL (20 Nov 2021 08:31)  POCT Blood Glucose.: 162 mg/dL (19 Nov 2021 21:10)  POCT Blood Glucose.: 154 mg/dL (19 Nov 2021 15:56)                          8.0    9.86  )-----------( 640      ( 20 Nov 2021 07:26 )             23.7     11-20    136  |  106  |  38<H>  ----------------------------<  99  5.1<H>   |  16<L>  |  1.5    Ca    8.8      20 Nov 2021 07:26    TPro  5.5<L>  /  Alb  4.0  /  TBili  0.9  /  DBili  x   /  AST  9   /  ALT  9   /  AlkPhos  86  11-20      COVID-19 PCR: NotDetec (11-18-21 @ 21:16)      a/p  # anemia, due to MDS, sp transfusion,   # ckd 3 stable, cr at baseline  # DM uncontrolled, A1C 8.6: cont current meds, fu with pmd for changing/adjusting meds( pt states she is compliant with meds and diet)     #Progress Note Handoff  Pending (specify):  discharge today   Family discussion: niraj pt   Disposition: Home___  time spent 35 min

## 2021-11-20 NOTE — CONSULT NOTE ADULT - ASSESSMENT
Patient is a 76 year old female with # Low_risk myelodysplastic syndrome, previously treated with Revlimid and Procrit and Vidaza. 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   Patient is s/p 9 cycles of Vidaza and she was requiring pRBC transfusion every 3 weeks   Given the persistent transfusion dependence she was switched to Luspatercept in Dec 2020.   Hyperkalemia likely due to increased platelet; pseudohyperkalemia.    PLAN:  - Continue Luspatercept 1.75 mg/kg every 3 weeks (please fill NF form, pt can bring her own meds)  - Continue Procrit 80,000 units every week.   Side effects of Procrit discussed including but not limited to: hypertension, headache, pruritus, nausea, vomiting, injection site pain, arthralgia, cough, fever.  - Monitor CBC weekly and administer transfusions as needed to keep Hgb >7 gm/dL.      #Vitamin B 12 deficiency  - Continue Vitamin B 12 injection every month    # Thrombocytosis  - S/P Hydrea  - Will continue to monitor.         Patient is a 76 year old female with # Low_risk myelodysplastic syndrome, previously treated with Revlimid and Procrit and Vidaza. 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   Patient is s/p 9 cycles of Vidaza and she was requiring pRBC transfusion every 3 weeks   Given the persistent transfusion dependence she was switched to Luspatercept in Dec 2020.   Hyperkalemia likely due to increased platelet; pseudohyperkalemia.    PLAN:  - Continue Luspatercept 1.75 mg/kg every 3 weeks (last dose 11/9, due on 11/23 -can f/u in [place])  - Continue Procrit 80,000 units every week (Last on 11/18 with Vit B12 injection)  Side effects of Procrit discussed including but not limited to: hypertension, headache, pruritus, nausea, vomiting, injection site pain, arthralgia, cough, fever.  - Monitor CBC weekly and administer transfusions as needed to keep Hgb >7 gm/dL.    #Vitamin B 12 deficiency  - Continue Vitamin B 12 injection every month    # Thrombocytosis  - S/P Hydrea  - Will continue to monitor.    Pt can be discharged from hem onc standpoint and follow up with Dr Wade as scheduled.      Patient is a 76 year old female with # Low_risk myelodysplastic syndrome, previously treated with Revlimid and Procrit and Vidaza. 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   Patient is s/p 9 cycles of Vidaza and she was requiring pRBC transfusion every 3 weeks   Given the persistent transfusion dependence she was switched to Luspatercept in Dec 2020.   Hyperkalemia likely due to increased platelet; pseudohyperkalemia.    PLAN:  - Continue Luspatercept 1.75 mg/kg every 3 weeks (last dose 11/9, due on 11/23 -can f/u in [place])  - Continue Procrit 80,000 units every week (Last on 11/18 with Vit B12 injection)  Side effects of Procrit discussed including but not limited to: hypertension, headache, pruritus, nausea, vomiting, injection site pain, arthralgia, cough, fever.  - Monitor CBC weekly and administer transfusions as needed to keep Hgb >7 gm/dL.    #Vitamin B 12 deficiency  - Continue Vitamin B 12 injection every month    # Thrombocytosis  - S/P Hydrea  - Will continue to monitor.    Pt can be discharged from hem onc standpoint and follow up with Dr Wade  -pt to call Gerald on Monday to try to schedule her appt earlier than the scheduled date.

## 2021-11-20 NOTE — DISCHARGE NOTE PROVIDER - HOSPITAL COURSE
Patient is a 76 year old female with # Low_risk myelodysplastic syndrome, previously treated with Revlimid and Procrit and Vidaza. 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   Patient is s/p 9 cycles of Vidaza and she was requiring pRBC transfusion every 3 weeks   Given the persistent transfusion dependence she was switched to Luspatercept in Dec 2020.   Hyperkalemia likely due to increased platelet; pseudohyperkalemia. Patient was transfused during admission.  Hematology cleared patient for Discharge.    PLAN:  - Continue Luspatercept 1.75 mg/kg every 3 weeks   - Continue Procrit 80,000 units every week.

## 2021-11-20 NOTE — DISCHARGE NOTE NURSING/CASE MANAGEMENT/SOCIAL WORK - PATIENT PORTAL LINK FT
You can access the FollowMyHealth Patient Portal offered by [facility] by registering at the following website: [email or web address]. By joining [employer]’s FollowMyHealth portal, you will also be able to view your health information using other applications (apps) compatible with our system.

## 2021-11-20 NOTE — CONSULT NOTE ADULT - SUBJECTIVE AND OBJECTIVE BOX
Patient is a 76y old  Female who presents with a chief complaint of     HPI:  77 yo F with h/o MDS, DM on metformin and glimepiride presents from Dr. Wade sent in for transfusion for anemia 2/2 MDS. Pt stated that she has been feeling weak for the last 2 days. Denies any other symptoms.     Denies Chest pain, SOB, dizziness, fever, abdominal pain, black or bloody stool, dysuria.     In the ED, Temp 99.6, , /61, saturating 100% on RA. Labs shows Hb of 6.1, K of 6,  . Anion gap metabolic acidosis. S/P  fluids and insulin in the ED with improvement.     Pt endorses medication compliance and diet compliance.   (2021 01:51)         PAST MEDICAL & SURGICAL HISTORY:  DM (diabetes mellitus)    MDS (myelodysplastic syndrome)    No significant past surgical history        SOCIAL HISTORY:    FAMILY HISTORY:    Allergies    No Known Allergies    Intolerances      ROS:  Negative except for:              HOME MEDICATIONS:  glimepiride 2 mg oral tablet: 1 tab(s) orally 2 times a day (2021 04:21)  metFORMIN: 500 milligram(s) orally 2 times a day (27 May 2020 17:13)  Procrit: injectable every 7 days (2021 04:24)      Vital Signs Last 24 Hrs  T(C): 37 (2021 00:30), Max: 37 (2021 00:30)  T(F): 98.6 (2021 00:30), Max: 98.6 (2021 00:30)  HR: 78 (2021 00:30) (78 - 87)  BP: 133/62 (2021 00:30) (125/60 - 142/61)  BP(mean): 88 (2021 12:00) (86 - 88)  RR: 16 (2021 00:30) (16 - 18)  SpO2: 100% (2021 00:30) (100% - 100%)    PHYSICAL EXAM  General: adult in NAD  HEENT: clear oropharynx, anicteric sclera, pink conjunctiva  Neck: supple  CV: normal S1/S2 with no murmur rubs or gallops  Lungs: positive air movement b/l ant lungs,clear to auscultation, no wheezes, no rales  Abdomen: soft non-tender non-distended, no hepatosplenomegaly  Ext: no clubbing cyanosis or edema  Skin: no rashes and no petechiae  Neuro: alert and oriented X 4, no focal deficits    MEDICATIONS  (STANDING):  chlorhexidine 4% Liquid 1 Application(s) Topical <User Schedule>  dextrose 40% Gel 15 Gram(s) Oral once  dextrose 5%. 1000 milliLiter(s) (50 mL/Hr) IV Continuous <Continuous>  dextrose 5%. 1000 milliLiter(s) (100 mL/Hr) IV Continuous <Continuous>  dextrose 50% Injectable 25 Gram(s) IV Push once  dextrose 50% Injectable 12.5 Gram(s) IV Push once  dextrose 50% Injectable 25 Gram(s) IV Push once  enoxaparin Injectable 40 milliGRAM(s) SubCutaneous daily  glucagon  Injectable 1 milliGRAM(s) IntraMuscular once  insulin glargine Injectable (LANTUS) 12 Unit(s) SubCutaneous at bedtime  insulin lispro (ADMELOG) corrective regimen sliding scale   SubCutaneous Before meals and at bedtime  insulin lispro Injectable (ADMELOG) 4 Unit(s) SubCutaneous three times a day before meals  pantoprazole    Tablet 40 milliGRAM(s) Oral before breakfast    MEDICATIONS  (PRN):  acetaminophen     Tablet .. 650 milliGRAM(s) Oral every 6 hours PRN Temp greater or equal to 38C (100.4F), Mild Pain (1 - 3)  aluminum hydroxide/magnesium hydroxide/simethicone Suspension 30 milliLiter(s) Oral every 4 hours PRN Dyspepsia  melatonin 3 milliGRAM(s) Oral at bedtime PRN Insomnia  ondansetron Injectable 4 milliGRAM(s) IV Push every 8 hours PRN Nausea and/or Vomiting      LABS:                          8.5    9.27  )-----------( 684      ( 2021 04:30 )             24.9         Mean Cell Volume : 90.9 fL  Mean Cell Hemoglobin : 31.0 pg  Mean Cell Hemoglobin Concentration : 34.1 g/dL  Auto Neutrophil # : 6.74 K/uL  Auto Lymphocyte # : 1.16 K/uL  Auto Monocyte # : 0.89 K/uL  Auto Eosinophil # : 0.17 K/uL  Auto Basophil # : 0.15 K/uL  Auto Neutrophil % : 72.8 %  Auto Lymphocyte % : 12.5 %  Auto Monocyte % : 9.6 %  Auto Eosinophil % : 1.8 %  Auto Basophil % : 1.6 %      Serial CBC's   @ 04:30  Hct-24.9 / Hgb-8.5 / Plat-684 / RBC-2.74 / WBC-9.27  Serial CBC's   @ 19:38  Hct-18.0 / Hgb-6.1 / Plat-726 / RBC-1.83 / WBC-9.50          136  |  105  |  32<H>  ----------------------------<  171<H>  4.9   |  16<L>  |  1.5    Ca    9.5      2021 04:30    TPro  6.0  /  Alb  4.4  /  TBili  0.9  /  DBili  x   /  AST  7   /  ALT  10  /  AlkPhos  104        Urinalysis Basic - ( 2021 20:56 )    Color: Light Yellow / Appearance: Clear / S.016 / pH: x  Gluc: x / Ketone: Negative  / Bili: Negative / Urobili: <2 mg/dL   Blood: x / Protein: 30 mg/dL / Nitrite: Negative   Leuk Esterase: Negative / RBC: 1 /HPF / WBC 2 /HPF   Sq Epi: x / Non Sq Epi: 2 /HPF / Bacteria: Negative      RADIOLOGY & ADDITIONAL STUDIES:    
Patient is a 76y old  Female who presents with a chief complaint of     HPI:  75 yo F with h/o MDS, DM on metformin and glimepiride presents from Dr. Wade sent in for transfusion for anemia 2/2 MDS. Pt stated that she has been feeling weak for the last 2 days. Denies any other symptoms.     Denies Chest pain, SOB, dizziness, fever, abdominal pain, black or bloody stool, dysuria.     In the ED, Temp 99.6, , /61, saturating 100% on RA. Labs shows Hb of 6.1, K of 6,  . Anion gap metabolic acidosis. S/P  fluids and insulin in the ED with improvement.     Pt endorses medication compliance and diet compliance.   (2021 01:51)         PAST MEDICAL & SURGICAL HISTORY:  DM (diabetes mellitus)    MDS (myelodysplastic syndrome)    No significant past surgical history        SOCIAL HISTORY:    FAMILY HISTORY:    Allergies    No Known Allergies    Intolerances      ROS:  Negative except for:              HOME MEDICATIONS:  glimepiride 2 mg oral tablet: 1 tab(s) orally 2 times a day (2021 04:21)  metFORMIN: 500 milligram(s) orally 2 times a day (27 May 2020 17:13)  Procrit: injectable every 7 days (2021 04:24)      Vital Signs Last 24 Hrs  T(C): 37 (2021 00:30), Max: 37 (2021 00:30)  T(F): 98.6 (2021 00:30), Max: 98.6 (2021 00:30)  HR: 78 (2021 00:30) (78 - 87)  BP: 133/62 (2021 00:30) (125/60 - 142/61)  BP(mean): 88 (2021 12:00) (86 - 88)  RR: 16 (2021 00:30) (16 - 18)  SpO2: 100% (2021 00:30) (100% - 100%)    PHYSICAL EXAM  General: adult in NAD  HEENT: clear oropharynx, anicteric sclera, pink conjunctiva  Neck: supple  CV: normal S1/S2 with no murmur rubs or gallops  Lungs: positive air movement b/l ant lungs,clear to auscultation, no wheezes, no rales  Abdomen: soft non-tender non-distended, no hepatosplenomegaly  Ext: no clubbing cyanosis or edema  Skin: no rashes and no petechiae  Neuro: alert and oriented X 4, no focal deficits    MEDICATIONS  (STANDING):  chlorhexidine 4% Liquid 1 Application(s) Topical <User Schedule>  dextrose 40% Gel 15 Gram(s) Oral once  dextrose 5%. 1000 milliLiter(s) (50 mL/Hr) IV Continuous <Continuous>  dextrose 5%. 1000 milliLiter(s) (100 mL/Hr) IV Continuous <Continuous>  dextrose 50% Injectable 25 Gram(s) IV Push once  dextrose 50% Injectable 12.5 Gram(s) IV Push once  dextrose 50% Injectable 25 Gram(s) IV Push once  enoxaparin Injectable 40 milliGRAM(s) SubCutaneous daily  glucagon  Injectable 1 milliGRAM(s) IntraMuscular once  insulin glargine Injectable (LANTUS) 12 Unit(s) SubCutaneous at bedtime  insulin lispro (ADMELOG) corrective regimen sliding scale   SubCutaneous Before meals and at bedtime  insulin lispro Injectable (ADMELOG) 4 Unit(s) SubCutaneous three times a day before meals  pantoprazole    Tablet 40 milliGRAM(s) Oral before breakfast    MEDICATIONS  (PRN):  acetaminophen     Tablet .. 650 milliGRAM(s) Oral every 6 hours PRN Temp greater or equal to 38C (100.4F), Mild Pain (1 - 3)  aluminum hydroxide/magnesium hydroxide/simethicone Suspension 30 milliLiter(s) Oral every 4 hours PRN Dyspepsia  melatonin 3 milliGRAM(s) Oral at bedtime PRN Insomnia  ondansetron Injectable 4 milliGRAM(s) IV Push every 8 hours PRN Nausea and/or Vomiting      LABS:                          8.5    9.27  )-----------( 684      ( 2021 04:30 )             24.9         Mean Cell Volume : 90.9 fL  Mean Cell Hemoglobin : 31.0 pg  Mean Cell Hemoglobin Concentration : 34.1 g/dL  Auto Neutrophil # : 6.74 K/uL  Auto Lymphocyte # : 1.16 K/uL  Auto Monocyte # : 0.89 K/uL  Auto Eosinophil # : 0.17 K/uL  Auto Basophil # : 0.15 K/uL  Auto Neutrophil % : 72.8 %  Auto Lymphocyte % : 12.5 %  Auto Monocyte % : 9.6 %  Auto Eosinophil % : 1.8 %  Auto Basophil % : 1.6 %      Serial CBC's   @ 04:30  Hct-24.9 / Hgb-8.5 / Plat-684 / RBC-2.74 / WBC-9.27  Serial CBC's   @ 19:38  Hct-18.0 / Hgb-6.1 / Plat-726 / RBC-1.83 / WBC-9.50          136  |  105  |  32<H>  ----------------------------<  171<H>  4.9   |  16<L>  |  1.5    Ca    9.5      2021 04:30    TPro  6.0  /  Alb  4.4  /  TBili  0.9  /  DBili  x   /  AST  7   /  ALT  10  /  AlkPhos  104      Urinalysis Basic - ( 2021 20:56 )    Color: Light Yellow / Appearance: Clear / S.016 / pH: x  Gluc: x / Ketone: Negative  / Bili: Negative / Urobili: <2 mg/dL   Blood: x / Protein: 30 mg/dL / Nitrite: Negative   Leuk Esterase: Negative / RBC: 1 /HPF / WBC 2 /HPF   Sq Epi: x / Non Sq Epi: 2 /HPF / Bacteria: Negative          RADIOLOGY & ADDITIONAL STUDIES:

## 2021-11-20 NOTE — DISCHARGE NOTE PROVIDER - NSDCCPCAREPLAN_GEN_ALL_CORE_FT
PRINCIPAL DISCHARGE DIAGNOSIS  Diagnosis: Anemia  Assessment and Plan of Treatment: Please follow up with your hematologist on discharge.      SECONDARY DISCHARGE DIAGNOSES  Diagnosis: DKA (diabetic ketoacidosis)  Assessment and Plan of Treatment: Please  continue medication as prescibed. Please follow up woith your  edocrinology.

## 2021-11-20 NOTE — DISCHARGE NOTE PROVIDER - CARE PROVIDER_API CALL
ERNESTO SIEGEL  Internal Medicine  [address] [place] [address]  Phone: [phone]  Fax: [phone]  Follow Up Time:

## 2021-11-20 NOTE — DISCHARGE NOTE PROVIDER - NSDCMRMEDTOKEN_GEN_ALL_CORE_FT
glimepiride 2 mg oral tablet: 1 tab(s) orally 2 times a day  metFORMIN: 500 milligram(s) orally 2 times a day  Procrit: injectable every 7 days

## 2021-11-20 NOTE — DISCHARGE NOTE PROVIDER - NSDCFUSCHEDAPPT_GEN_ALL_CORE_FT
SIMONA KUHN ; 11/23/2021 ; [facility] SIMONA Berry ; 12/14/2021 ; [facility]

## 2021-11-23 ENCOUNTER — APPOINTMENT (OUTPATIENT)
Dept: INFUSION THERAPY | Facility: CLINIC | Age: 76
End: 2021-11-23

## 2021-11-26 DIAGNOSIS — I12.9 HYPERTENSIVE CHRONIC KIDNEY DISEASE WITH STAGE 1 THROUGH STAGE 4 CHRONIC KIDNEY DISEASE, OR UNSPECIFIED CHRONIC KIDNEY DISEASE: ICD-10-CM

## 2021-11-26 DIAGNOSIS — E11.65 TYPE 2 DIABETES MELLITUS WITH HYPERGLYCEMIA: ICD-10-CM

## 2021-11-26 DIAGNOSIS — D46.9 MYELODYSPLASTIC SYNDROME, UNSPECIFIED: ICD-10-CM

## 2021-11-26 DIAGNOSIS — E87.2 ACIDOSIS: ICD-10-CM

## 2021-11-26 DIAGNOSIS — N18.30 CHRONIC KIDNEY DISEASE, STAGE 3 UNSPECIFIED: ICD-10-CM

## 2021-11-26 DIAGNOSIS — D53.9 NUTRITIONAL ANEMIA, UNSPECIFIED: ICD-10-CM

## 2021-11-26 DIAGNOSIS — D64.9 ANEMIA, UNSPECIFIED: ICD-10-CM

## 2021-11-26 DIAGNOSIS — E11.22 TYPE 2 DIABETES MELLITUS WITH DIABETIC CHRONIC KIDNEY DISEASE: ICD-10-CM

## 2021-11-26 DIAGNOSIS — E87.5 HYPERKALEMIA: ICD-10-CM

## 2021-12-03 ENCOUNTER — EMERGENCY (EMERGENCY)
Facility: HOSPITAL | Age: 76
LOS: 0 days | Discharge: HOME | End: 2021-12-04
Attending: EMERGENCY MEDICINE | Admitting: EMERGENCY MEDICINE
Payer: MEDICARE

## 2021-12-03 ENCOUNTER — LABORATORY RESULT (OUTPATIENT)
Age: 76
End: 2021-12-03

## 2021-12-03 ENCOUNTER — APPOINTMENT (OUTPATIENT)
Dept: INFUSION THERAPY | Facility: CLINIC | Age: 76
End: 2021-12-03

## 2021-12-03 VITALS
RESPIRATION RATE: 18 BRPM | DIASTOLIC BLOOD PRESSURE: 65 MMHG | HEIGHT: 62 IN | TEMPERATURE: 99 F | WEIGHT: 138.89 LBS | SYSTOLIC BLOOD PRESSURE: 153 MMHG | HEART RATE: 91 BPM | OXYGEN SATURATION: 100 %

## 2021-12-03 DIAGNOSIS — Z79.84 LONG TERM (CURRENT) USE OF ORAL HYPOGLYCEMIC DRUGS: ICD-10-CM

## 2021-12-03 DIAGNOSIS — D64.9 ANEMIA, UNSPECIFIED: ICD-10-CM

## 2021-12-03 DIAGNOSIS — E11.9 TYPE 2 DIABETES MELLITUS WITHOUT COMPLICATIONS: ICD-10-CM

## 2021-12-03 DIAGNOSIS — R79.89 OTHER SPECIFIED ABNORMAL FINDINGS OF BLOOD CHEMISTRY: ICD-10-CM

## 2021-12-03 DIAGNOSIS — R53.1 WEAKNESS: ICD-10-CM

## 2021-12-03 LAB
ALBUMIN SERPL ELPH-MCNC: 4 G/DL — SIGNIFICANT CHANGE UP (ref 3.5–5.2)
ALP SERPL-CCNC: 90 U/L — SIGNIFICANT CHANGE UP (ref 30–115)
ALT FLD-CCNC: 14 U/L — SIGNIFICANT CHANGE UP (ref 0–41)
ANION GAP SERPL CALC-SCNC: 17 MMOL/L — HIGH (ref 7–14)
ANISOCYTOSIS BLD QL: SIGNIFICANT CHANGE UP
APTT BLD: 35 SEC — SIGNIFICANT CHANGE UP (ref 27–39.2)
AST SERPL-CCNC: 12 U/L — SIGNIFICANT CHANGE UP (ref 0–41)
BASOPHILS # BLD AUTO: 0 K/UL — SIGNIFICANT CHANGE UP (ref 0–0.2)
BASOPHILS NFR BLD AUTO: 0 % — SIGNIFICANT CHANGE UP (ref 0–1)
BILIRUB SERPL-MCNC: 0.5 MG/DL — SIGNIFICANT CHANGE UP (ref 0.2–1.2)
BLD GP AB SCN SERPL QL: SIGNIFICANT CHANGE UP
BUN SERPL-MCNC: 19 MG/DL — SIGNIFICANT CHANGE UP (ref 10–20)
CALCIUM SERPL-MCNC: 8.8 MG/DL — SIGNIFICANT CHANGE UP (ref 8.5–10.1)
CHLORIDE SERPL-SCNC: 103 MMOL/L — SIGNIFICANT CHANGE UP (ref 98–110)
CO2 SERPL-SCNC: 15 MMOL/L — LOW (ref 17–32)
CREAT SERPL-MCNC: 1.2 MG/DL — SIGNIFICANT CHANGE UP (ref 0.7–1.5)
EOSINOPHIL # BLD AUTO: 0.06 K/UL — SIGNIFICANT CHANGE UP (ref 0–0.7)
EOSINOPHIL NFR BLD AUTO: 0.9 % — SIGNIFICANT CHANGE UP (ref 0–8)
GIANT PLATELETS BLD QL SMEAR: PRESENT — SIGNIFICANT CHANGE UP
GLUCOSE SERPL-MCNC: 219 MG/DL — HIGH (ref 70–99)
HCT VFR BLD CALC: 17.6 % — LOW (ref 37–47)
HGB BLD-MCNC: 5.9 G/DL — CRITICAL LOW (ref 12–16)
HYPOCHROMIA BLD QL: SIGNIFICANT CHANGE UP
INR BLD: 1.04 RATIO — SIGNIFICANT CHANGE UP (ref 0.65–1.3)
LYMPHOCYTES # BLD AUTO: 1.15 K/UL — LOW (ref 1.2–3.4)
LYMPHOCYTES # BLD AUTO: 17.7 % — LOW (ref 20.5–51.1)
MACROCYTES BLD QL: SLIGHT — SIGNIFICANT CHANGE UP
MANUAL SMEAR VERIFICATION: SIGNIFICANT CHANGE UP
MCHC RBC-ENTMCNC: 31.9 PG — HIGH (ref 27–31)
MCHC RBC-ENTMCNC: 33.5 G/DL — SIGNIFICANT CHANGE UP (ref 32–37)
MCV RBC AUTO: 95.1 FL — SIGNIFICANT CHANGE UP (ref 81–99)
MICROCYTES BLD QL: SLIGHT — SIGNIFICANT CHANGE UP
MONOCYTES # BLD AUTO: 0.52 K/UL — SIGNIFICANT CHANGE UP (ref 0.1–0.6)
MONOCYTES NFR BLD AUTO: 8 % — SIGNIFICANT CHANGE UP (ref 1.7–9.3)
MYELOCYTES NFR BLD: 0.9 % — HIGH (ref 0–0)
NEUTROPHILS # BLD AUTO: 4.71 K/UL — SIGNIFICANT CHANGE UP (ref 1.4–6.5)
NEUTROPHILS NFR BLD AUTO: 69.9 % — SIGNIFICANT CHANGE UP (ref 42.2–75.2)
NEUTS BAND # BLD: 2.6 % — SIGNIFICANT CHANGE UP (ref 0–6)
NRBC # BLD: 1 /100 — HIGH (ref 0–0)
NRBC # BLD: SIGNIFICANT CHANGE UP /100 WBCS (ref 0–0)
OVALOCYTES BLD QL SMEAR: SLIGHT — SIGNIFICANT CHANGE UP
PLAT MORPH BLD: NORMAL — SIGNIFICANT CHANGE UP
PLATELET # BLD AUTO: 702 K/UL — HIGH (ref 130–400)
POIKILOCYTOSIS BLD QL AUTO: SLIGHT — SIGNIFICANT CHANGE UP
POLYCHROMASIA BLD QL SMEAR: SLIGHT — SIGNIFICANT CHANGE UP
POTASSIUM SERPL-MCNC: 4.9 MMOL/L — SIGNIFICANT CHANGE UP (ref 3.5–5)
POTASSIUM SERPL-SCNC: 4.9 MMOL/L — SIGNIFICANT CHANGE UP (ref 3.5–5)
PROT SERPL-MCNC: 5.7 G/DL — LOW (ref 6–8)
PROTHROM AB SERPL-ACNC: 12 SEC — SIGNIFICANT CHANGE UP (ref 9.95–12.87)
RBC # BLD: 1.85 M/UL — LOW (ref 4.2–5.4)
RBC # FLD: 21.7 % — HIGH (ref 11.5–14.5)
RBC BLD AUTO: ABNORMAL
SODIUM SERPL-SCNC: 135 MMOL/L — SIGNIFICANT CHANGE UP (ref 135–146)
WBC # BLD: 6.49 K/UL — SIGNIFICANT CHANGE UP (ref 4.8–10.8)
WBC # FLD AUTO: 6.49 K/UL — SIGNIFICANT CHANGE UP (ref 4.8–10.8)

## 2021-12-03 PROCEDURE — 71045 X-RAY EXAM CHEST 1 VIEW: CPT | Mod: 26

## 2021-12-03 PROCEDURE — 93010 ELECTROCARDIOGRAM REPORT: CPT

## 2021-12-03 PROCEDURE — 99218: CPT

## 2021-12-03 RX ORDER — ERYTHROPOIETIN 10000 [IU]/ML
80000 INJECTION, SOLUTION INTRAVENOUS; SUBCUTANEOUS ONCE
Refills: 0 | Status: COMPLETED | OUTPATIENT
Start: 2021-12-03 | End: 2021-12-03

## 2021-12-03 RX ORDER — LUSPATERCEPT 75 MG/1
110 INJECTION, POWDER, LYOPHILIZED, FOR SOLUTION SUBCUTANEOUS ONCE
Refills: 0 | Status: COMPLETED | OUTPATIENT
Start: 2021-12-03 | End: 2021-12-03

## 2021-12-03 RX ADMIN — ERYTHROPOIETIN 80000 UNIT(S): 10000 INJECTION, SOLUTION INTRAVENOUS; SUBCUTANEOUS at 16:16

## 2021-12-03 RX ADMIN — LUSPATERCEPT 110 MILLIGRAM(S): 75 INJECTION, POWDER, LYOPHILIZED, FOR SOLUTION SUBCUTANEOUS at 16:17

## 2021-12-03 NOTE — ED PROVIDER NOTE - ATTENDING CONTRIBUTION TO CARE
Pt presents seeking blood transfusion, referred in by PMD.  h/o MDS.  plan is transfuse and likely discharge.  hgb in ED 5.9 with minimal symptoms.

## 2021-12-03 NOTE — ED ADULT TRIAGE NOTE - NS ED TRIAGE AVPU SCALE
Detail Level: Detailed
Quality 110: Preventive Care And Screening: Influenza Immunization: Influenza Immunization previously received during influenza season
Alert-The patient is alert, awake and responds to voice. The patient is oriented to time, place, and person. The triage nurse is able to obtain subjective information.

## 2021-12-03 NOTE — ED CDU PROVIDER INITIAL DAY NOTE - OBJECTIVE STATEMENT
75 yo female with a pmh of MDS and DM sent into ED for hgb 6.2. pt states to have felt weakness over the past 2 days and denies any other symptoms including fevers, chill, headache, recent illness/travel, cough, abdominal pain, chest pain, or SOB.

## 2021-12-03 NOTE — ED ADULT NURSE NOTE - OBJECTIVE STATEMENT
pt referred from the oncology clinic for PRbc transfusion for low H/H. pt denies sob, cp, fever/chills, blood in stool.

## 2021-12-03 NOTE — ED PROVIDER NOTE - OBJECTIVE STATEMENT
77 yo female with a pmh of MDS and DM sent into ED for hgb 6.2. pt states to have felt weakness over the past 2 days and denies any other symptoms including fevers, chill, headache, recent illness/travel, cough, abdominal pain, chest pain, or SOB.

## 2021-12-04 VITALS
HEART RATE: 71 BPM | OXYGEN SATURATION: 99 % | TEMPERATURE: 99 F | DIASTOLIC BLOOD PRESSURE: 72 MMHG | SYSTOLIC BLOOD PRESSURE: 162 MMHG | RESPIRATION RATE: 20 BRPM

## 2021-12-04 PROCEDURE — 99217: CPT

## 2021-12-04 NOTE — ED CDU PROVIDER DISPOSITION NOTE - CARE PROVIDER_API CALL
Shaheen Mckenzie)  Hematology; Internal Medicine; Medical Oncology  [address] [place] [address]  Phone: [phone]  Fax: [phone]  Follow Up Time: 1-3 Days

## 2021-12-04 NOTE — ED CDU PROVIDER DISPOSITION NOTE - PATIENT PORTAL LINK FT
You can access the FollowMyHealth Patient Portal offered by [facility] by registering at the following website: [email or web address]. By joining [employer]’s FollowMyHealth portal, you will also be able to view your health information using other applications (apps) compatible with our system.

## 2021-12-04 NOTE — ED ADULT NURSE REASSESSMENT NOTE - NS ED NURSE REASSESS COMMENT FT1
2nd bag of PRBC completed, no signs of transfusion reaction.
1st PRBC bag transfused, no complaints of shortness of breath, chest pain, dizziness, headache, nausea, vomiting. 2nd bag started, no immediate reaction.

## 2021-12-06 LAB
HCT VFR BLD CALC: 19.1 %
HGB BLD-MCNC: 6.3 G/DL
MCHC RBC-ENTMCNC: 31.2 PG
MCHC RBC-ENTMCNC: 33 G/DL
MCV RBC AUTO: 94.6 FL
PLATELET # BLD AUTO: 696 K/UL
PMV BLD: 9.8 FL
RBC # BLD: 2.02 M/UL
RBC # FLD: 21.7 %
WBC # FLD AUTO: 7.76 K/UL

## 2021-12-09 ENCOUNTER — LABORATORY RESULT (OUTPATIENT)
Age: 76
End: 2021-12-09

## 2021-12-09 ENCOUNTER — APPOINTMENT (OUTPATIENT)
Dept: HEMATOLOGY ONCOLOGY | Facility: CLINIC | Age: 76
End: 2021-12-09
Payer: MEDICARE

## 2021-12-09 ENCOUNTER — APPOINTMENT (OUTPATIENT)
Dept: INFUSION THERAPY | Facility: CLINIC | Age: 76
End: 2021-12-09
Payer: MEDICARE

## 2021-12-09 VITALS
TEMPERATURE: 97.4 F | HEIGHT: 62 IN | RESPIRATION RATE: 14 BRPM | HEART RATE: 85 BPM | BODY MASS INDEX: 26.68 KG/M2 | WEIGHT: 145 LBS | SYSTOLIC BLOOD PRESSURE: 154 MMHG | DIASTOLIC BLOOD PRESSURE: 78 MMHG

## 2021-12-09 LAB
HCT VFR BLD CALC: 29.1 %
HGB BLD-MCNC: 9.9 G/DL
MCHC RBC-ENTMCNC: 31.4 PG
MCHC RBC-ENTMCNC: 34 G/DL
MCV RBC AUTO: 92.4 FL
PLATELET # BLD AUTO: 557 K/UL
PMV BLD: 9.3 FL
RBC # BLD: 3.15 M/UL
RBC # FLD: 17.5 %
WBC # FLD AUTO: 7.14 K/UL

## 2021-12-09 PROCEDURE — 99215 OFFICE O/P EST HI 40 MIN: CPT

## 2021-12-09 RX ORDER — ERYTHROPOIETIN 10000 [IU]/ML
80000 INJECTION, SOLUTION INTRAVENOUS; SUBCUTANEOUS ONCE
Refills: 0 | Status: COMPLETED | OUTPATIENT
Start: 2021-12-09 | End: 2021-12-09

## 2021-12-09 RX ADMIN — ERYTHROPOIETIN 80000 UNIT(S): 10000 INJECTION, SOLUTION INTRAVENOUS; SUBCUTANEOUS at 13:35

## 2021-12-10 ENCOUNTER — APPOINTMENT (OUTPATIENT)
Dept: INFUSION THERAPY | Facility: CLINIC | Age: 76
End: 2021-12-10

## 2021-12-10 NOTE — HISTORY OF PRESENT ILLNESS
[de-identified] : She missed on appointment for procrit last week.\par She starts her next cycle on 6/25/18\par C/o back pain radiating down her left which occurred when she bent to  something.\par No change in diarrhea.\par \par 7/31/18:\par Doing well. On Revlimid for more than 2yrs, 5 mg 2 weeks on 1 week off. She will be starting week on tonight. \par C/o diarrhea. On Imodium 2 pills AM and 2 pills PM. Doesn't help much with diarrhea. \par C/o nausea, abdominal pain. \par \par \par Colonoscopy in 2016 was normal. \par Did not have blood transfusion for over 2 years. \par On procrit [id number] weekly. Missed last week on 7/24/18 as she was out of town. She has been non compliant with weekly Procrit.\par Mammogram in 2017 was normal. \par \par 9/11/18\par pt is here for follow up.\par She feels the lomotil helps with the diarrhea.\par She was away for a few weeks and missed her procrit injections.\par No fever, abdominal  discomfort has been less since since use of lomotil.\par She started a new cycle 2 days ago.\par \par 10/2/18:\par She will start Revlimid tonight (week on). 2 weeks on, 1 week off. \par On ASA 81mg. \par Denies fever, nausea, vomiting, chest pain, SOB, abdominal pain, and bladder problems.\par C/o diarrhea. \par S/p 2 units PRBC transfusion on 9/25/18. \par On Procrit 73344 units weekly. Not compliant every week. \par C/o intermittent dizziness. \par \par 10/31/18\par Pt is here for follow up.\par C/O significant fatigue.\par Continues to have diarrhea, takes imodium and lomotil as needed.\par C/o dry cough, no fever.\par Cough started a month ago. It is worse in the mornings however over last week it has been constant all day.\par No chest pain.\par She was found to have low B12 levels and was started on B12 injections.\par \par 11/27/18:\par Doing well. Hospitalized for 3 days for cellulitis/abcess of the back s/p drainage and on Abx currently. Developed 3 days after Mg infusion as per pt. \par Denies nausea, vomiting, chest pain, SOB, abdominal pain, bowel and bladder problems.\par This is the week on Revlimid (week 1).\par S/p 1 unit PRBC transfusion in the hospital. \par On Procrit weekly \par \par 12/27/18:\par Doing well. No major complaints.\par Denies fever, nausea, vomiting, chest pain, SOB, abdominal pain, and bladder problems.\par On Revlimid 5 mg 2 weeks on 1 week off. This is the week off. She will start the week on sunday (12/30/18).\par Due for Procrit 69564 units today. \par Still has diarrhea. 4-5bm/day.\par On monthly B12 injections. \par \par 1/30/19:\par Patient here for follow up for MDS.  She is taking Revlimid 5 mg, 2 weeks on, 1 week off.  She will complete this current cycle on Saturday.  She has no new complaints today.  Patient denies cough, shortness of breath, denies fever, denies bone pain.\par \par 03/04/2019\par Patient is here for a follow up visit. She complaints of severe pain in her left shoulder and has had abscess drained 2 weeks ago. However the pain is worse and she has purulent discharge on examination. She also has Nasal sores that are painful. Culture from 11/2018 showed MRSA.  Denies Fever. \par She also complaints of swelling in her right leg. Not tender and not erythematous and negative Gong;s sign. \par Her diarrhea with Revlimid is ongoing and Imodium and Lomotil helps but not everyday. \par She is on 60,000 units of procrit weekly and Revlimid 5 mg 2 weeks on 1 week off. \par \par 4/23/19\par Pt is here for follow up.\par She feels well.\par The nasal sores have improved with bactroban\par The abscess on left shoulder has resolved.\par However she has a new one on the middle of her back.\par No fever.\par Pt has been on procrit 94270 units weekly, however she missed a dose in between.\par \par 5/8/19\par pt is here for follow up.\par no new complaints.\par feels well.\par Her skin abscess has resolved.\par she has been unable to go to ID, as she could not get an appt.\par No bleeding.\par She is compliant with revlimid.\par \par 6/6/19\par Pt is here for follow up.\par no new complaints \par Feels well.\par Is on week 2 of her Revlimid cycle. \par No transfusions since last visit\par \par 7/17/19\par Pt is here for follow up.\par C/O fatigue.\par Was away for a few days two weeks ago, but missed treatment with procrit for 2 weeks.\par Is complaint with Revlimid 2 weeks on 1 week off.\par Diarrhea is a little improved.\par \par 8/14/19\par Patient here for follow up visit, feeling well.  Although she is complaining of some pain at the left scapula area recently.  Patient denies cough, shortness of breath, denies fever, denies other bone pain.  She is off Revlimid  this week, due to restart on Monday.  She is scheduled for Procrit and Vitamin B12 injection today.  She plans to leave the country tomorrow to visit her mother who is ill.  She will return in about 10 days.\par \par 9/11/19\par Pt is here for follow up.\par She was away for 3 weeks, out of which she took procrit for 2 weeks in [place].\par She missed last week's dose.\par She had CBCs in [place] which showed Hgb of 8.9\par She feels well.\par She still getting diarrhea.\par She has been using lomotil with very minimal relief.\par She started a new cycle of Revlimid 4 days ago.\par \par 10/3/19\par Patient here for follow up visit, feeling fairly well.   Patient denies cough, shortness of breath, denies fever, denies other bone pain.  She remains on Revlimid.  She is scheduled for Procrit and Vitamin B12 injection today.\par \par 10/24/19\par Pt is here for follow up.\par Feels well.\par No SOB, fatigue.\par Compliant with procrit and Revl;imid.\par Remains on ASa.\par Diarrhea is unchanged.\par Pt takes imodium as needed.\par \par 11/14/19\par Pt is here for follow up.\par No new complaints.\par Starts a new cycle of Revlimid today.\par Diarrhea is same as before, no rectal bleeding.\par No fever.\par \par 12/5/19\par Pt is here for follow up.\par No new complaints.\par Denies feeling weaker than usual.\par No fever, SOB.\par No change in frequency of diarrhea.\par No pain.\par Will start next cycle of Revlimid in 3 days.\par \par \par !/16/20\par Pt was recently DC[facility] 3 days ago after being treated for pneumonia and MARCO.\par She is on po Levaquin.\par She restarted Revlmid 3 days ago.\par She is feeling better now. No fever.\par No SOB, Chest pain and back pain has resolved.\par Pt has a cough, no expectoration.\par She also recd a unit of PRBCs last week in the hospital\par \par \par 1/30/20\par Patient here for follow up visit, feeling well.  She has no new complaints. Cough is almost gone completely.  Patient denies shortness of breath, denies fever, denies bone pain.  Her appetite is ok, weight is stable.  She is due to restart Revlimid on Monday.\par \par 02/20/20\par Pt is here for follow up, feeling well.\par Offers no new complaints. Denies SOB, fever, chills, weight loss, CP, cough. \par Currently off week of Revlimid 5 mg. Restarts on Monday.\par Has procrit 80,000 units today. Next b 12 injection due in 2 weeks \par Did not get chest Xray\par CBC reviewed WBC 3.1, h/h 8.3/25%, plt 386, neutrophils 1.29\par \par 3/12/20\par Pt is here for follow up.\par She took Revlimid for 2 weeks and then has been off for one week although she was advised to take it daily without break.\par No SOB, Cough, fever.\par Has mild fatigue.\par \par 04/02/2020\par SIMONA KUHN a 75 year F is here today for follow up visit of MDS.\par Denies fever, chills, cough,night sweats, weight loss. \par Denies bleeding or bruising.\par Pt receives procrit 80,000 units weekly and B12 IM monthly. \par Continued Revlimid 5 mg daily x 3 weeks, has 1 dose left tonight. \par CBC reviewed: WBC 3.2, H/H 8.1/25.2, neutrophils 1.6, PLT 72\par \par 4/20/2020: The patient is here for follow up . She has no complaints of fever, chills, dizziness , chest pain and shortness of breath . She is still with diarrhea , up to 10 watery BMs per day, responds poorly to imodium and lomotil. She is on Revlimid 5 mg daily , took last dose yesterday night. Her last Procrit was on April 13. \par \par 5/26/20\par Pt is here for follow up.\par She is feeling well. Denies SOB, chest pain, fever.\par Continues to have diarrhea although she is off of Revlmid.\par Thinks it may due to diabetic meds.\par Wants to discuss BM bx results\par \par 6/22/20\par Pt is here for follow up.\par Feels well. Denies any fever, N, V, D\par Continues to have diarrhea, she will talk to her PCP about changing metformin for DM.\par Has been needing PRBCs 1 unit each month\par \par 7/20/20\par Pt is here for follow up.\par No new complaints. Has been feeling well.\par No SOB, CP. \par No N, V, D.\par She has recd 2 units of PRBCs since May 20.\par \par 8/17/20\par Pt is here for a follow up visit.\par She is feeling well.\par No new symptoms. No N, V, D.\par No bruising or bleeding. She continues to need PRBCs every 2-3 weeks.\par \par 8/31/20\par Pt is here for follow up. She is feeling well. No N, V, D.\par She is tolerating the hydrea well. No SOB, CP\par No bruising ior bleeding\par \par 9/8/20\par Pt is here for follow up.\par She had been contacted by the [facility] last week to advise her to stop the hydrea. Pt did not check her messages and continued with Hydrea.\par No fever, N, V, bleeding.\par Saw Dr Ferrell last week.\par \par 9/14/20\par Pt is here for folow up.\par Besides her usual complaint of diarrhea she is feeling well.\par Recd 1 unit PRBC last week.\par Has stopped Hydrea.\par No fever, mouth sores.\par \par 9/29/20\par Pt is here for folow up.\par No new complaints.\par Is seeing GI for diarrhea net week.\par No fever, night sweats.\par REcd 3 units of PRBC this month\par \par 10/13/20\par Pt is here for follow up.\par No new complaints.\par Has not needed a transfusion since 3 weeks ago.\par Continues to have diarrhea, waiting to be seen by GI\par \par 10/26/20\par Pt is here for follow up.\par C/O feeling fatigued.\par Has CLARK.\par No nausea or vomiting.\par No fever.\par Diarrhea has improved a little. She is no longer on Metformin\par \par 11/9/20\par Pt is here for follow up.\par Feels well. Denies SOB, CP, fatigue\par \par 12/7/20- Patient is for follow up and is due for cycle 9 of Vidaza.  She still has loose BM sometimes 10 times a day and was seeing Dr. Corey from GI- did not follow up recently and is unable to get an appt with him. She has lost about 10 lbs since September. No N/V. No fever or chills. No CP/SOB. She has been getting PRBC transfusion every 3 weeks now\par \par 01/04/20 Pt presented today for f/u visit . She is s/p 8 cycles of vidaza and was started on Luspatercept in Dec , 2020 . So far she received 1 injection , she is due for 2nd injection today . Adverse effect profile was discussed with her in detail , she reports having some dizziness and weakness after first injection . She received blood transfusion last wk . Blood work from today reviewed as well and counts are adequate . She denies any fevers,  chills,  or any new symptoms . \par \par 1/25/21\par Pt is here for follow up.\par C/O diarrhea, otherwise feeling better.\par Has not needed a transfusion since 12/29\par \par 2/16/21\par Pt is here for follow up.\par Needed transfusion on 2/8/21.\par Continues to C/o fatigue. Diarrhea remains the same, has not made GI appt as yet.\par \par 3/8/21\par Pt is here for follow up.\par Feels better today. Recd 1 unit PRBCs last week.\par Diarrhea is same as before. has an appt with GI next week.\par No fever\par \par 3/30/21\par Pt is here for follow up.\par Feels better. Last transfusion was on 3/2/21\par Saw GI and was started on cholestyramine and metformin was DCED with resolution of diarrhea.\par She denies any SOB,\par Fatigue is better\par \par 4/20/2021\par Pt is here to f/u for MDS\par She is s/p Luspatercept cycle #6\par She is compliant with Aspirin\par She feels better today, appetite is good\par She denies shortness of breath, fatigue, or weakness \par She c/o of diarrhea but it has improved since she was started on Cholestyramine. Stool is soft and less in frequency\par She received COVID vaccine x 2 doses\par \par 5/11/21\par pt is here for follow up.\par Feels the same with fatigue, diarrhea.\par No SOB\par \par 6/21/21\par Pt is here for follow up.\par Feels well. No SOB, CP, N< V.\par Diarrhea is better controlled now.\par Last PRBC transfusion was 2 weeks ago.\par \par 7/19/21\par Pt is here for follow up.\par Feels a little tired, last transfusion was 6/1/21.\par No bleeding, fever, SOB\par \par 8/10/2021\par Patient is here to follow up for her MDS.\par She is on Luspatercept, tolerating well.\par She feels well today, the appetite is good.\par She denies shortness of breath, fatigue, fever, night sweats, abdominal pain, arthralgias/myalgias.\par \par 8/31/21\par Pt is here for follow up.\par C/O feeling very fatigued.\par No bleeding.\par No fever.\par \par 9/21/2021\par Patient is here to follow up for MDS.\par She is on Luspatercept and Retacrit, tolerating well.\par She gets blood transfusion as needed for Hgb <7 gm/dL\par She is c/o of fatigue, no shortness of breath, dizziness, chills or lightheadedness.\par She denies melena or hematochezia.\par Her appetite is good, no nausea/vomiting.\par \par 10/28/2021\par Patient is here to follow up for MDS.\par She is on Luspatercept and Retacrit, tolerating well.\par She gets blood transfusion to keep Hgb > 7 gm/dL.\par She is c/o of chronic fatigue, no shortness of breath, dizziness, chills or lightheadedness.\par She denies melena or hematochezia.\par Her appetite is good, no nausea/vomiting.\par She c/o of severe diarrhea, 10-12x/day watery stool while on Imodium in the last 2 weeks.\par Last colonoscopy was in 2016, benign as per patient.\par \par 11/18/21\par Pt is here for follow up. C/O fatigue. No SOB, CP\par \par 12/9/21\par Pt is here for follow up.\par Feels better today. Recd 2 units last week in ER.\par Planning to go to [place] for over a week and does not want to miss her procrit dose.\par Diarrhea is stable [de-identified] : \par

## 2021-12-10 NOTE — PHYSICAL EXAM
[Restricted in physically strenuous activity but ambulatory and able to carry out work of a light or sedentary nature] : Status 1- Restricted in physically strenuous activity but ambulatory and able to carry out work of a light or sedentary nature, e.g., light house work, office work [Normal] : affect appropriate [de-identified] : pallor +

## 2021-12-10 NOTE — ASSESSMENT
[FreeTextEntry1] : Patient is a 76 year old female with # Lowrisk myelodysplastic syndrome, previously treated with Revlimid and Procrit and Vidaza . 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 \par Patient is s/p  9 cycles of Vidaza and she was requiring pRBC transfusion every 3 weeks \par Given the persistent transfusion dependence she was switched to Luspatercept.\par \par Hyperkalemia likely due to increased platelet; pseudohyperkalemia.\par \par PLAN:\par -- Continue Luspatercept 1.75 mg/kg every 3 weeks, \par --Continue Procrit 80,000 units every week.\par Side effects of Procrit discussed including but not limited to: hypertension, headache, pruritus, nausea, vomiting, injection site pain, arthralgia, cough, fever.\par -- Monitor CBC weekly and administer transfusions as needed to keep Hgb >7 gm/dL.\par \par Sent a script for procrit which she will self inject while in [place]. She will also check a CBC there and go to ER if transfusion is needed\par # Vitamin  B 12 deficiency\par -- Continue Vitamin B 12 injection every month\par \par # Thrombocytosis\par -- S/P Hydrea\par -- Will continue to monitor.\par \par \par RTC in 2 weeks with next Luspatercept injection\par \par \par \par

## 2021-12-14 ENCOUNTER — APPOINTMENT (OUTPATIENT)
Dept: INFUSION THERAPY | Facility: CLINIC | Age: 76
End: 2021-12-14

## 2021-12-16 ENCOUNTER — LABORATORY RESULT (OUTPATIENT)
Age: 76
End: 2021-12-16

## 2021-12-16 ENCOUNTER — APPOINTMENT (OUTPATIENT)
Dept: INFUSION THERAPY | Facility: CLINIC | Age: 76
End: 2021-12-16

## 2021-12-16 RX ORDER — PREGABALIN 225 MG/1
1000 CAPSULE ORAL ONCE
Refills: 0 | Status: COMPLETED | OUTPATIENT
Start: 2021-12-16 | End: 2021-12-16

## 2021-12-16 RX ORDER — ERYTHROPOIETIN 10000 [IU]/ML
80000 INJECTION, SOLUTION INTRAVENOUS; SUBCUTANEOUS ONCE
Refills: 0 | Status: COMPLETED | OUTPATIENT
Start: 2021-12-16 | End: 2021-12-16

## 2021-12-16 RX ADMIN — ERYTHROPOIETIN 80000 UNIT(S): 10000 INJECTION, SOLUTION INTRAVENOUS; SUBCUTANEOUS at 11:02

## 2021-12-16 RX ADMIN — PREGABALIN 1000 MICROGRAM(S): 225 CAPSULE ORAL at 11:02

## 2021-12-17 LAB
HCT VFR BLD CALC: 27.9 %
HGB BLD-MCNC: 9.7 G/DL
MCHC RBC-ENTMCNC: 31.3 PG
MCHC RBC-ENTMCNC: 34.8 G/DL
MCV RBC AUTO: 90 FL
PLATELET # BLD AUTO: 551 K/UL
PMV BLD: 10.3 FL
RBC # BLD: 3.1 M/UL
RBC # FLD: 17.3 %
WBC # FLD AUTO: 6.05 K/UL

## 2021-12-23 ENCOUNTER — LABORATORY RESULT (OUTPATIENT)
Age: 76
End: 2021-12-23

## 2021-12-23 ENCOUNTER — APPOINTMENT (OUTPATIENT)
Dept: HEMATOLOGY ONCOLOGY | Facility: CLINIC | Age: 76
End: 2021-12-23
Payer: MEDICARE

## 2021-12-23 ENCOUNTER — APPOINTMENT (OUTPATIENT)
Dept: INFUSION THERAPY | Facility: CLINIC | Age: 76
End: 2021-12-23
Payer: MEDICARE

## 2021-12-23 VITALS
SYSTOLIC BLOOD PRESSURE: 155 MMHG | WEIGHT: 138 LBS | BODY MASS INDEX: 25.4 KG/M2 | HEART RATE: 125 BPM | HEIGHT: 62 IN | DIASTOLIC BLOOD PRESSURE: 64 MMHG | TEMPERATURE: 97.1 F | RESPIRATION RATE: 14 BRPM

## 2021-12-23 LAB
HCT VFR BLD CALC: 28.1 %
HGB BLD-MCNC: 9.5 G/DL
MCHC RBC-ENTMCNC: 31 PG
MCHC RBC-ENTMCNC: 33.8 G/DL
MCV RBC AUTO: 91.8 FL
PLATELET # BLD AUTO: 678 K/UL
PMV BLD: 9.5 FL
RBC # BLD: 3.06 M/UL
RBC # FLD: 17.2 %
WBC # FLD AUTO: 9.52 K/UL

## 2021-12-23 PROCEDURE — 99215 OFFICE O/P EST HI 40 MIN: CPT

## 2021-12-23 RX ORDER — ERYTHROPOIETIN 10000 [IU]/ML
80000 INJECTION, SOLUTION INTRAVENOUS; SUBCUTANEOUS ONCE
Refills: 0 | Status: COMPLETED | OUTPATIENT
Start: 2021-12-23 | End: 2021-12-23

## 2021-12-23 RX ORDER — LUSPATERCEPT 75 MG/1
110 INJECTION, POWDER, LYOPHILIZED, FOR SOLUTION SUBCUTANEOUS ONCE
Refills: 0 | Status: COMPLETED | OUTPATIENT
Start: 2021-12-23 | End: 2021-12-23

## 2021-12-23 RX ADMIN — LUSPATERCEPT 110 MILLIGRAM(S): 75 INJECTION, POWDER, LYOPHILIZED, FOR SOLUTION SUBCUTANEOUS at 15:23

## 2021-12-23 RX ADMIN — ERYTHROPOIETIN 80000 UNIT(S): 10000 INJECTION, SOLUTION INTRAVENOUS; SUBCUTANEOUS at 15:24

## 2021-12-25 LAB
ALBUMIN SERPL ELPH-MCNC: 4.5 G/DL
ALP BLD-CCNC: 155 U/L
ALT SERPL-CCNC: 66 U/L
ANION GAP SERPL CALC-SCNC: 20 MMOL/L
AST SERPL-CCNC: 53 U/L
BILIRUB SERPL-MCNC: 0.9 MG/DL
BUN SERPL-MCNC: 29 MG/DL
CALCIUM SERPL-MCNC: 9.1 MG/DL
CHLORIDE SERPL-SCNC: 96 MMOL/L
CO2 SERPL-SCNC: 19 MMOL/L
CREAT SERPL-MCNC: 1.4 MG/DL
GLUCOSE SERPL-MCNC: 321 MG/DL
POTASSIUM SERPL-SCNC: 5.2 MMOL/L
PROT SERPL-MCNC: 6.5 G/DL
SODIUM SERPL-SCNC: 135 MMOL/L

## 2021-12-25 NOTE — REVIEW OF SYSTEMS
[Fatigue] : fatigue [Recent Change In Weight] : ~T recent weight change [Diarrhea] : diarrhea [Negative] : Allergic/Immunologic [Shortness Of Breath] : no shortness of breath [FreeTextEntry7] : watery diarrhea [FreeTextEntry2] : weight loss 10 lbs in the last 4 months

## 2021-12-25 NOTE — HISTORY OF PRESENT ILLNESS
[de-identified] : She missed on appointment for procrit last week.\par She starts her next cycle on 6/25/18\par C/o back pain radiating down her left which occurred when she bent to  something.\par No change in diarrhea.\par \par 7/31/18:\par Doing well. On Revlimid for more than 2yrs, 5 mg 2 weeks on 1 week off. She will be starting week on tonight. \par C/o diarrhea. On Imodium 2 pills AM and 2 pills PM. Doesn't help much with diarrhea. \par C/o nausea, abdominal pain. \par \par \par Colonoscopy in 2016 was normal. \par Did not have blood transfusion for over 2 years. \par On procrit [id number] weekly. Missed last week on 7/24/18 as she was out of town. She has been non compliant with weekly Procrit.\par Mammogram in 2017 was normal. \par \par 9/11/18\par pt is here for follow up.\par She feels the lomotil helps with the diarrhea.\par She was away for a few weeks and missed her procrit injections.\par No fever, abdominal  discomfort has been less since since use of lomotil.\par She started a new cycle 2 days ago.\par \par 10/2/18:\par She will start Revlimid tonight (week on). 2 weeks on, 1 week off. \par On ASA 81mg. \par Denies fever, nausea, vomiting, chest pain, SOB, abdominal pain, and bladder problems.\par C/o diarrhea. \par S/p 2 units PRBC transfusion on 9/25/18. \par On Procrit 20964 units weekly. Not compliant every week. \par C/o intermittent dizziness. \par \par 10/31/18\par Pt is here for follow up.\par C/O significant fatigue.\par Continues to have diarrhea, takes imodium and lomotil as needed.\par C/o dry cough, no fever.\par Cough started a month ago. It is worse in the mornings however over last week it has been constant all day.\par No chest pain.\par She was found to have low B12 levels and was started on B12 injections.\par \par 11/27/18:\par Doing well. Hospitalized for 3 days for cellulitis/abcess of the back s/p drainage and on Abx currently. Developed 3 days after Mg infusion as per pt. \par Denies nausea, vomiting, chest pain, SOB, abdominal pain, bowel and bladder problems.\par This is the week on Revlimid (week 1).\par S/p 1 unit PRBC transfusion in the hospital. \par On Procrit weekly \par \par 12/27/18:\par Doing well. No major complaints.\par Denies fever, nausea, vomiting, chest pain, SOB, abdominal pain, and bladder problems.\par On Revlimid 5 mg 2 weeks on 1 week off. This is the week off. She will start the week on sunday (12/30/18).\par Due for Procrit 18726 units today. \par Still has diarrhea. 4-5bm/day.\par On monthly B12 injections. \par \par 1/30/19:\par Patient here for follow up for MDS.  She is taking Revlimid 5 mg, 2 weeks on, 1 week off.  She will complete this current cycle on Saturday.  She has no new complaints today.  Patient denies cough, shortness of breath, denies fever, denies bone pain.\par \par 03/04/2019\par Patient is here for a follow up visit. She complaints of severe pain in her left shoulder and has had abscess drained 2 weeks ago. However the pain is worse and she has purulent discharge on examination. She also has Nasal sores that are painful. Culture from 11/2018 showed MRSA.  Denies Fever. \par She also complaints of swelling in her right leg. Not tender and not erythematous and negative Gong;s sign. \par Her diarrhea with Revlimid is ongoing and Imodium and Lomotil helps but not everyday. \par She is on 60,000 units of procrit weekly and Revlimid 5 mg 2 weeks on 1 week off. \par \par 4/23/19\par Pt is here for follow up.\par She feels well.\par The nasal sores have improved with bactroban\par The abscess on left shoulder has resolved.\par However she has a new one on the middle of her back.\par No fever.\par Pt has been on procrit 45307 units weekly, however she missed a dose in between.\par \par 5/8/19\par pt is here for follow up.\par no new complaints.\par feels well.\par Her skin abscess has resolved.\par she has been unable to go to ID, as she could not get an appt.\par No bleeding.\par She is compliant with revlimid.\par \par 6/6/19\par Pt is here for follow up.\par no new complaints \par Feels well.\par Is on week 2 of her Revlimid cycle. \par No transfusions since last visit\par \par 7/17/19\par Pt is here for follow up.\par C/O fatigue.\par Was away for a few days two weeks ago, but missed treatment with procrit for 2 weeks.\par Is complaint with Revlimid 2 weeks on 1 week off.\par Diarrhea is a little improved.\par \par 8/14/19\par Patient here for follow up visit, feeling well.  Although she is complaining of some pain at the left scapula area recently.  Patient denies cough, shortness of breath, denies fever, denies other bone pain.  She is off Revlimid  this week, due to restart on Monday.  She is scheduled for Procrit and Vitamin B12 injection today.  She plans to leave the country tomorrow to visit her mother who is ill.  She will return in about 10 days.\par \par 9/11/19\par Pt is here for follow up.\par She was away for 3 weeks, out of which she took procrit for 2 weeks in [place].\par She missed last week's dose.\par She had CBCs in [place] which showed Hgb of 8.9\par She feels well.\par She still getting diarrhea.\par She has been using lomotil with very minimal relief.\par She started a new cycle of Revlimid 4 days ago.\par \par 10/3/19\par Patient here for follow up visit, feeling fairly well.   Patient denies cough, shortness of breath, denies fever, denies other bone pain.  She remains on Revlimid.  She is scheduled for Procrit and Vitamin B12 injection today.\par \par 10/24/19\par Pt is here for follow up.\par Feels well.\par No SOB, fatigue.\par Compliant with procrit and Revl;imid.\par Remains on ASa.\par Diarrhea is unchanged.\par Pt takes imodium as needed.\par \par 11/14/19\par Pt is here for follow up.\par No new complaints.\par Starts a new cycle of Revlimid today.\par Diarrhea is same as before, no rectal bleeding.\par No fever.\par \par 12/5/19\par Pt is here for follow up.\par No new complaints.\par Denies feeling weaker than usual.\par No fever, SOB.\par No change in frequency of diarrhea.\par No pain.\par Will start next cycle of Revlimid in 3 days.\par \par \par !/16/20\par Pt was recently DCed from [facility] 3 days ago after being treated for pneumonia and MARCO.\par She is on po Levaquin.\par She restarted Revlmid 3 days ago.\par She is feeling better now. No fever.\par No SOB, Chest pain and back pain has resolved.\par Pt has a cough, no expectoration.\par She also recd a unit of PRBCs last week in the hospital\par \par \par 1/30/20\par Patient here for follow up visit, feeling well.  She has no new complaints. Cough is almost gone completely.  Patient denies shortness of breath, denies fever, denies bone pain.  Her appetite is ok, weight is stable.  She is due to restart Revlimid on Monday.\par \par 02/20/20\par Pt is here for follow up, feeling well.\par Offers no new complaints. Denies SOB, fever, chills, weight loss, CP, cough. \par Currently off week of Revlimid 5 mg. Restarts on Monday.\par Has procrit 80,000 units today. Next b 12 injection due in 2 weeks \par Did not get chest Xray\par CBC reviewed WBC 3.1, h/h 8.3/25%, plt 386, neutrophils 1.29\par \par 3/12/20\par Pt is here for follow up.\par She took Revlimid for 2 weeks and then has been off for one week although she was advised to take it daily without break.\par No SOB, Cough, fever.\par Has mild fatigue.\par \par 04/02/2020\par SIMONA KUHN a 75 year F is here today for follow up visit of MDS.\par Denies fever, chills, cough,night sweats, weight loss. \par Denies bleeding or bruising.\par Pt receives procrit 80,000 units weekly and B12 IM monthly. \par Continued Revlimid 5 mg daily x 3 weeks, has 1 dose left tonight. \par CBC reviewed: WBC 3.2, H/H 8.1/25.2, neutrophils 1.6, PLT 72\par \par 4/20/2020: The patient is here for follow up . She has no complaints of fever, chills, dizziness , chest pain and shortness of breath . She is still with diarrhea , up to 10 watery BMs per day, responds poorly to imodium and lomotil. She is on Revlimid 5 mg daily , took last dose yesterday night. Her last Procrit was on April 13. \par \par 5/26/20\par Pt is here for follow up.\par She is feeling well. Denies SOB, chest pain, fever.\par Continues to have diarrhea although she is off of Revlmid.\par Thinks it may due to diabetic meds.\par Wants to discuss BM bx results\par \par 6/22/20\par Pt is here for follow up.\par Feels well. Denies any fever, N, V, D\par Continues to have diarrhea, she will talk to her PCP about changing metformin for DM.\par Has been needing PRBCs 1 unit each month\par \par 7/20/20\par Pt is here for follow up.\par No new complaints. Has been feeling well.\par No SOB, CP. \par No N, V, D.\par She has recd 2 units of PRBCs since May 20.\par \par 8/17/20\par Pt is here for a follow up visit.\par She is feeling well.\par No new symptoms. No N, V, D.\par No bruising or bleeding. She continues to need PRBCs every 2-3 weeks.\par \par 8/31/20\par Pt is here for follow up. She is feeling well. No N, V, D.\par She is tolerating the hydrea well. No SOB, CP\par No bruising ior bleeding\par \par 9/8/20\par Pt is here for follow up.\par She had been contacted by the [facility] last week to advise her to stop the hydrea. Pt did not check her messages and continued with Hydrea.\par No fever, N, V, bleeding.\par Saw Dr Ferrell last week.\par \par 9/14/20\par Pt is here for folow up.\par Besides her usual complaint of diarrhea she is feeling well.\par Recd 1 unit PRBC last week.\par Has stopped Hydrea.\par No fever, mouth sores.\par \par 9/29/20\par Pt is here for folow up.\par No new complaints.\par Is seeing GI for diarrhea net week.\par No fever, night sweats.\par REcd 3 units of PRBC this month\par \par 10/13/20\par Pt is here for follow up.\par No new complaints.\par Has not needed a transfusion since 3 weeks ago.\par Continues to have diarrhea, waiting to be seen by GI\par \par 10/26/20\par Pt is here for follow up.\par C/O feeling fatigued.\par Has CLARK.\par No nausea or vomiting.\par No fever.\par Diarrhea has improved a little. She is no longer on Metformin\par \par 11/9/20\par Pt is here for follow up.\par Feels well. Denies SOB, CP, fatigue\par \par 12/7/20- Patient is for follow up and is due for cycle 9 of Vidaza.  She still has loose BM sometimes 10 times a day and was seeing Dr. Corey from GI- did not follow up recently and is unable to get an appt with him. She has lost about 10 lbs since September. No N/V. No fever or chills. No CP/SOB. She has been getting PRBC transfusion every 3 weeks now\par \par 01/04/20 Pt presented today for f/u visit . She is s/p 8 cycles of vidaza and was started on Luspatercept in Dec , 2020 . So far she received 1 injection , she is due for 2nd injection today . Adverse effect profile was discussed with her in detail , she reports having some dizziness and weakness after first injection . She received blood transfusion last wk . Blood work from today reviewed as well and counts are adequate . She denies any fevers,  chills,  or any new symptoms . \par \par 1/25/21\par Pt is here for follow up.\par C/O diarrhea, otherwise feeling better.\par Has not needed a transfusion since 12/29\par \par 2/16/21\par Pt is here for follow up.\par Needed transfusion on 2/8/21.\par Continues to C/o fatigue. Diarrhea remains the same, has not made GI appt as yet.\par \par 3/8/21\par Pt is here for follow up.\par Feels better today. Recd 1 unit PRBCs last week.\par Diarrhea is same as before. has an appt with GI next week.\par No fever\par \par 3/30/21\par Pt is here for follow up.\par Feels better. Last transfusion was on 3/2/21\par Saw GI and was started on cholestyramine and metformin was DCED with resolution of diarrhea.\par She denies any SOB,\par Fatigue is better\par \par 4/20/2021\par Pt is here to f/u for MDS\par She is s/p Luspatercept cycle #6\par She is compliant with Aspirin\par She feels better today, appetite is good\par She denies shortness of breath, fatigue, or weakness \par She c/o of diarrhea but it has improved since she was started on Cholestyramine. Stool is soft and less in frequency\par She received COVID vaccine x 2 doses\par \par 5/11/21\par pt is here for follow up.\par Feels the same with fatigue, diarrhea.\par No SOB\par \par 6/21/21\par Pt is here for follow up.\par Feels well. No SOB, CP, N< V.\par Diarrhea is better controlled now.\par Last PRBC transfusion was 2 weeks ago.\par \par 7/19/21\par Pt is here for follow up.\par Feels a little tired, last transfusion was 6/1/21.\par No bleeding, fever, SOB\par \par 8/10/2021\par Patient is here to follow up for her MDS.\par She is on Luspatercept, tolerating well.\par She feels well today, the appetite is good.\par She denies shortness of breath, fatigue, fever, night sweats, abdominal pain, arthralgias/myalgias.\par \par 8/31/21\par Pt is here for follow up.\par C/O feeling very fatigued.\par No bleeding.\par No fever.\par \par 9/21/2021\par Patient is here to follow up for MDS.\par She is on Luspatercept and Retacrit, tolerating well.\par She gets blood transfusion as needed for Hgb <7 gm/dL\par She is c/o of fatigue, no shortness of breath, dizziness, chills or lightheadedness.\par She denies melena or hematochezia.\par Her appetite is good, no nausea/vomiting.\par \par 10/28/2021\par Patient is here to follow up for MDS.\par She is on Luspatercept and Retacrit, tolerating well.\par She gets blood transfusion to keep Hgb > 7 gm/dL.\par She is c/o of chronic fatigue, no shortness of breath, dizziness, chills or lightheadedness.\par She denies melena or hematochezia.\par Her appetite is good, no nausea/vomiting.\par She c/o of severe diarrhea, 10-12x/day watery stool while on Imodium in the last 2 weeks.\par Last colonoscopy was in 2016, benign as per patient.\par \par 11/18/21\par Pt is here for follow up. C/O fatigue. No SOB, CP\par \par 12/9/21\par Pt is here for follow up.\par Feels better today. Recd 2 units last week in ER.\par Planning to go to [place] for over a week and does not want to miss her procrit dose.\par Diarrhea is stable,\par \par 12/23/21\par Pt is here for follow up.\par Feels better. No SOB, CP. Going to [place] tomorrow. Has not been able to get Procrit injection from the pharmacy yet d/t insurance issues [de-identified] : \par

## 2021-12-25 NOTE — ASSESSMENT
[FreeTextEntry1] : Patient is a 76 year old female with # Lowrisk myelodysplastic syndrome, previously treated with Revlimid and Procrit and Vidaza . 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 \par Patient is s/p  9 cycles of Vidaza and she was requiring pRBC transfusion every 3 weeks \par Given the persistent transfusion dependence she was switched to Luspatercept.\par \par Hyperkalemia likely due to increased platelet; pseudohyperkalemia.\par \par PLAN:\par -- Continue Luspatercept 1.75 mg/kg every 3 weeks, \par --Continue Procrit 80,000 units every week.\par Side effects of Procrit discussed including but not limited to: hypertension, headache, pruritus, nausea, vomiting, injection site pain, arthralgia, cough, fever.\par -- Monitor CBC weekly and administer transfusions as needed to keep Hgb >7 gm/dL.\par \par Had sent a script for procrit which she will self inject while in [place]. She will also check a CBC there and go to ER if transfusion is needed\par # Vitamin  B 12 deficiency\par -- Continue Vitamin B 12 injection every month\par \par # Thrombocytosis\par -- S/P Hydrea\par -- Will continue to monitor.\par \par \par RTC in 3 weeks with next Luspatercept injection\par \par \par \par

## 2021-12-25 NOTE — PHYSICAL EXAM
[Restricted in physically strenuous activity but ambulatory and able to carry out work of a light or sedentary nature] : Status 1- Restricted in physically strenuous activity but ambulatory and able to carry out work of a light or sedentary nature, e.g., light house work, office work [Normal] : affect appropriate [de-identified] : pallor +

## 2021-12-30 ENCOUNTER — APPOINTMENT (OUTPATIENT)
Dept: INFUSION THERAPY | Facility: CLINIC | Age: 76
End: 2021-12-30

## 2022-01-06 ENCOUNTER — LABORATORY RESULT (OUTPATIENT)
Age: 77
End: 2022-01-06

## 2022-01-06 ENCOUNTER — APPOINTMENT (OUTPATIENT)
Dept: INFUSION THERAPY | Facility: CLINIC | Age: 77
End: 2022-01-06

## 2022-01-06 RX ORDER — ERYTHROPOIETIN 10000 [IU]/ML
80000 INJECTION, SOLUTION INTRAVENOUS; SUBCUTANEOUS ONCE
Refills: 0 | Status: COMPLETED | OUTPATIENT
Start: 2022-01-06 | End: 2022-01-06

## 2022-01-06 RX ADMIN — ERYTHROPOIETIN 80000 UNIT(S): 10000 INJECTION, SOLUTION INTRAVENOUS; SUBCUTANEOUS at 11:59

## 2022-01-09 LAB
HCT VFR BLD CALC: 21.7 %
HGB BLD-MCNC: 7.3 G/DL
MCHC RBC-ENTMCNC: 30.8 PG
MCHC RBC-ENTMCNC: 33.6 G/DL
MCV RBC AUTO: 91.6 FL
PLATELET # BLD AUTO: 474 K/UL
PMV BLD: 11.1 FL
RBC # BLD: 2.37 M/UL
RBC # FLD: 18.7 %
WBC # FLD AUTO: 8.61 K/UL

## 2022-01-13 ENCOUNTER — APPOINTMENT (OUTPATIENT)
Dept: HEMATOLOGY ONCOLOGY | Facility: CLINIC | Age: 77
End: 2022-01-13
Payer: MEDICARE

## 2022-01-13 ENCOUNTER — LABORATORY RESULT (OUTPATIENT)
Age: 77
End: 2022-01-13

## 2022-01-13 ENCOUNTER — OUTPATIENT (OUTPATIENT)
Dept: OUTPATIENT SERVICES | Facility: HOSPITAL | Age: 77
LOS: 1 days | Discharge: HOME | End: 2022-01-13

## 2022-01-13 ENCOUNTER — APPOINTMENT (OUTPATIENT)
Dept: INFUSION THERAPY | Facility: CLINIC | Age: 77
End: 2022-01-13
Payer: MEDICARE

## 2022-01-13 VITALS
TEMPERATURE: 98.3 F | BODY MASS INDEX: 25.76 KG/M2 | SYSTOLIC BLOOD PRESSURE: 139 MMHG | HEART RATE: 117 BPM | HEIGHT: 62 IN | WEIGHT: 140 LBS | DIASTOLIC BLOOD PRESSURE: 60 MMHG

## 2022-01-13 DIAGNOSIS — D46.9 MYELODYSPLASTIC SYNDROME, UNSPECIFIED: ICD-10-CM

## 2022-01-13 DIAGNOSIS — D64.9 ANEMIA, UNSPECIFIED: ICD-10-CM

## 2022-01-13 DIAGNOSIS — D75.839 THROMBOCYTOSIS, UNSPECIFIED: ICD-10-CM

## 2022-01-13 DIAGNOSIS — E53.8 DEFICIENCY OF OTHER SPECIFIED B GROUP VITAMINS: ICD-10-CM

## 2022-01-13 DIAGNOSIS — D47.3 ESSENTIAL (HEMORRHAGIC) THROMBOCYTHEMIA: ICD-10-CM

## 2022-01-13 DIAGNOSIS — Z51.11 ENCOUNTER FOR ANTINEOPLASTIC CHEMOTHERAPY: ICD-10-CM

## 2022-01-13 PROCEDURE — 99213 OFFICE O/P EST LOW 20 MIN: CPT

## 2022-01-13 RX ORDER — ERYTHROPOIETIN 10000 [IU]/ML
80000 INJECTION, SOLUTION INTRAVENOUS; SUBCUTANEOUS ONCE
Refills: 0 | Status: COMPLETED | OUTPATIENT
Start: 2022-01-13 | End: 2022-02-10

## 2022-01-13 RX ORDER — PREGABALIN 225 MG/1
1000 CAPSULE ORAL ONCE
Refills: 0 | Status: COMPLETED | OUTPATIENT
Start: 2022-01-13 | End: 2022-01-13

## 2022-01-13 RX ORDER — LUSPATERCEPT 75 MG/1
110 INJECTION, POWDER, LYOPHILIZED, FOR SOLUTION SUBCUTANEOUS ONCE
Refills: 0 | Status: COMPLETED | OUTPATIENT
Start: 2022-01-13 | End: 2022-01-13

## 2022-01-13 RX ADMIN — PREGABALIN 1000 MICROGRAM(S): 225 CAPSULE ORAL at 12:25

## 2022-01-13 RX ADMIN — LUSPATERCEPT 110 MILLIGRAM(S): 75 INJECTION, POWDER, LYOPHILIZED, FOR SOLUTION SUBCUTANEOUS at 12:26

## 2022-01-13 NOTE — HISTORY OF PRESENT ILLNESS
[de-identified] : She missed on appointment for procrit last week.\par She starts her next cycle on 6/25/18\par C/o back pain radiating down her left which occurred when she bent to  something.\par No change in diarrhea.\par \par 7/31/18:\par Doing well. On Revlimid for more than 2yrs, 5 mg 2 weeks on 1 week off. She will be starting week on tonight. \par C/o diarrhea. On Imodium 2 pills AM and 2 pills PM. Doesn't help much with diarrhea. \par C/o nausea, abdominal pain. \par \par \par Colonoscopy in 2016 was normal. \par Did not have blood transfusion for over 2 years. \par On procrit [id number] weekly. Missed last week on 7/24/18 as she was out of town. She has been non compliant with weekly Procrit.\par Mammogram in 2017 was normal. \par \par 9/11/18\par pt is here for follow up.\par She feels the lomotil helps with the diarrhea.\par She was away for a few weeks and missed her procrit injections.\par No fever, abdominal  discomfort has been less since since use of lomotil.\par She started a new cycle 2 days ago.\par \par 10/2/18:\par She will start Revlimid tonight (week on). 2 weeks on, 1 week off. \par On ASA 81mg. \par Denies fever, nausea, vomiting, chest pain, SOB, abdominal pain, and bladder problems.\par C/o diarrhea. \par S/p 2 units PRBC transfusion on 9/25/18. \par On Procrit 58946 units weekly. Not compliant every week. \par C/o intermittent dizziness. \par \par 10/31/18\par Pt is here for follow up.\par C/O significant fatigue.\par Continues to have diarrhea, takes imodium and lomotil as needed.\par C/o dry cough, no fever.\par Cough started a month ago. It is worse in the mornings however over last week it has been constant all day.\par No chest pain.\par She was found to have low B12 levels and was started on B12 injections.\par \par 11/27/18:\par Doing well. Hospitalized for 3 days for cellulitis/abcess of the back s/p drainage and on Abx currently. Developed 3 days after Mg infusion as per pt. \par Denies nausea, vomiting, chest pain, SOB, abdominal pain, bowel and bladder problems.\par This is the week on Revlimid (week 1).\par S/p 1 unit PRBC transfusion in the hospital. \par On Procrit weekly \par \par 12/27/18:\par Doing well. No major complaints.\par Denies fever, nausea, vomiting, chest pain, SOB, abdominal pain, and bladder problems.\par On Revlimid 5 mg 2 weeks on 1 week off. This is the week off. She will start the week on sunday (12/30/18).\par Due for Procrit 39485 units today. \par Still has diarrhea. 4-5bm/day.\par On monthly B12 injections. \par \par 1/30/19:\par Patient here for follow up for MDS.  She is taking Revlimid 5 mg, 2 weeks on, 1 week off.  She will complete this current cycle on Saturday.  She has no new complaints today.  Patient denies cough, shortness of breath, denies fever, denies bone pain.\par \par 03/04/2019\par Patient is here for a follow up visit. She complaints of severe pain in her left shoulder and has had abscess drained 2 weeks ago. However the pain is worse and she has purulent discharge on examination. She also has Nasal sores that are painful. Culture from 11/2018 showed MRSA.  Denies Fever. \par She also complaints of swelling in her right leg. Not tender and not erythematous and negative Gong;s sign. \par Her diarrhea with Revlimid is ongoing and Imodium and Lomotil helps but not everyday. \par She is on 60,000 units of procrit weekly and Revlimid 5 mg 2 weeks on 1 week off. \par \par 4/23/19\par Pt is here for follow up.\par She feels well.\par The nasal sores have improved with bactroban\par The abscess on left shoulder has resolved.\par However she has a new one on the middle of her back.\par No fever.\par Pt has been on procrit 35755 units weekly, however she missed a dose in between.\par \par 5/8/19\par pt is here for follow up.\par no new complaints.\par feels well.\par Her skin abscess has resolved.\par she has been unable to go to ID, as she could not get an appt.\par No bleeding.\par She is compliant with revlimid.\par \par 6/6/19\par Pt is here for follow up.\par no new complaints \par Feels well.\par Is on week 2 of her Revlimid cycle. \par No transfusions since last visit\par \par 7/17/19\par Pt is here for follow up.\par C/O fatigue.\par Was away for a few days two weeks ago, but missed treatment with procrit for 2 weeks.\par Is complaint with Revlimid 2 weeks on 1 week off.\par Diarrhea is a little improved.\par \par 8/14/19\par Patient here for follow up visit, feeling well.  Although she is complaining of some pain at the left scapula area recently.  Patient denies cough, shortness of breath, denies fever, denies other bone pain.  She is off Revlimid  this week, due to restart on Monday.  She is scheduled for Procrit and Vitamin B12 injection today.  She plans to leave the country tomorrow to visit her mother who is ill.  She will return in about 10 days.\par \par 9/11/19\par Pt is here for follow up.\par She was away for 3 weeks, out of which she took procrit for 2 weeks in [place].\par She missed last week's dose.\par She had CBCs in [place] which showed Hgb of 8.9\par She feels well.\par She still getting diarrhea.\par She has been using lomotil with very minimal relief.\par She started a new cycle of Revlimid 4 days ago.\par \par 10/3/19\par Patient here for follow up visit, feeling fairly well.   Patient denies cough, shortness of breath, denies fever, denies other bone pain.  She remains on Revlimid.  She is scheduled for Procrit and Vitamin B12 injection today.\par \par 10/24/19\par Pt is here for follow up.\par Feels well.\par No SOB, fatigue.\par Compliant with procrit and Revl;imid.\par Remains on ASa.\par Diarrhea is unchanged.\par Pt takes imodium as needed.\par \par 11/14/19\par Pt is here for follow up.\par No new complaints.\par Starts a new cycle of Revlimid today.\par Diarrhea is same as before, no rectal bleeding.\par No fever.\par \par 12/5/19\par Pt is here for follow up.\par No new complaints.\par Denies feeling weaker than usual.\par No fever, SOB.\par No change in frequency of diarrhea.\par No pain.\par Will start next cycle of Revlimid in 3 days.\par \par \par !/16/20\par Pt was recently DCed from [facility] 3 days ago after being treated for pneumonia and MARCO.\par She is on po Levaquin.\par She restarted Revlmid 3 days ago.\par She is feeling better now. No fever.\par No SOB, Chest pain and back pain has resolved.\par Pt has a cough, no expectoration.\par She also recd a unit of PRBCs last week in the hospital\par \par \par 1/30/20\par Patient here for follow up visit, feeling well.  She has no new complaints. Cough is almost gone completely.  Patient denies shortness of breath, denies fever, denies bone pain.  Her appetite is ok, weight is stable.  She is due to restart Revlimid on Monday.\par \par 02/20/20\par Pt is here for follow up, feeling well.\par Offers no new complaints. Denies SOB, fever, chills, weight loss, CP, cough. \par Currently off week of Revlimid 5 mg. Restarts on Monday.\par Has procrit 80,000 units today. Next b 12 injection due in 2 weeks \par Did not get chest Xray\par CBC reviewed WBC 3.1, h/h 8.3/25%, plt 386, neutrophils 1.29\par \par 3/12/20\par Pt is here for follow up.\par She took Revlimid for 2 weeks and then has been off for one week although she was advised to take it daily without break.\par No SOB, Cough, fever.\par Has mild fatigue.\par \par 04/02/2020\par SIMONA KUHN a 75 year F is here today for follow up visit of MDS.\par Denies fever, chills, cough,night sweats, weight loss. \par Denies bleeding or bruising.\par Pt receives procrit 80,000 units weekly and B12 IM monthly. \par Continued Revlimid 5 mg daily x 3 weeks, has 1 dose left tonight. \par CBC reviewed: WBC 3.2, H/H 8.1/25.2, neutrophils 1.6, PLT 72\par \par 4/20/2020: The patient is here for follow up . She has no complaints of fever, chills, dizziness , chest pain and shortness of breath . She is still with diarrhea , up to 10 watery BMs per day, responds poorly to imodium and lomotil. She is on Revlimid 5 mg daily , took last dose yesterday night. Her last Procrit was on April 13. \par \par 5/26/20\par Pt is here for follow up.\par She is feeling well. Denies SOB, chest pain, fever.\par Continues to have diarrhea although she is off of Revlmid.\par Thinks it may due to diabetic meds.\par Wants to discuss BM bx results\par \par 6/22/20\par Pt is here for follow up.\par Feels well. Denies any fever, N, V, D\par Continues to have diarrhea, she will talk to her PCP about changing metformin for DM.\par Has been needing PRBCs 1 unit each month\par \par 7/20/20\par Pt is here for follow up.\par No new complaints. Has been feeling well.\par No SOB, CP. \par No N, V, D.\par She has recd 2 units of PRBCs since May 20.\par \par 8/17/20\par Pt is here for a follow up visit.\par She is feeling well.\par No new symptoms. No N, V, D.\par No bruising or bleeding. She continues to need PRBCs every 2-3 weeks.\par \par 8/31/20\par Pt is here for follow up. She is feeling well. No N, V, D.\par She is tolerating the hydrea well. No SOB, CP\par No bruising ior bleeding\par \par 9/8/20\par Pt is here for follow up.\par She had been contacted by the [facility] last week to advise her to stop the hydrea. Pt did not check her messages and continued with Hydrea.\par No fever, N, V, bleeding.\par Saw Dr Ferrell last week.\par \par 9/14/20\par Pt is here for folow up.\par Besides her usual complaint of diarrhea she is feeling well.\par Recd 1 unit PRBC last week.\par Has stopped Hydrea.\par No fever, mouth sores.\par \par 9/29/20\par Pt is here for folow up.\par No new complaints.\par Is seeing GI for diarrhea net week.\par No fever, night sweats.\par REcd 3 units of PRBC this month\par \par 10/13/20\par Pt is here for follow up.\par No new complaints.\par Has not needed a transfusion since 3 weeks ago.\par Continues to have diarrhea, waiting to be seen by GI\par \par 10/26/20\par Pt is here for follow up.\par C/O feeling fatigued.\par Has CLARK.\par No nausea or vomiting.\par No fever.\par Diarrhea has improved a little. She is no longer on Metformin\par \par 11/9/20\par Pt is here for follow up.\par Feels well. Denies SOB, CP, fatigue\par \par 12/7/20- Patient is for follow up and is due for cycle 9 of Vidaza.  She still has loose BM sometimes 10 times a day and was seeing Dr. Corey from GI- did not follow up recently and is unable to get an appt with him. She has lost about 10 lbs since September. No N/V. No fever or chills. No CP/SOB. She has been getting PRBC transfusion every 3 weeks now\par \par 01/04/20 Pt presented today for f/u visit . She is s/p 8 cycles of vidaza and was started on Luspatercept in Dec , 2020 . So far she received 1 injection , she is due for 2nd injection today . Adverse effect profile was discussed with her in detail , she reports having some dizziness and weakness after first injection . She received blood transfusion last wk . Blood work from today reviewed as well and counts are adequate . She denies any fevers,  chills,  or any new symptoms . \par \par 1/25/21\par Pt is here for follow up.\par C/O diarrhea, otherwise feeling better.\par Has not needed a transfusion since 12/29\par \par 2/16/21\par Pt is here for follow up.\par Needed transfusion on 2/8/21.\par Continues to C/o fatigue. Diarrhea remains the same, has not made GI appt as yet.\par \par 3/8/21\par Pt is here for follow up.\par Feels better today. Recd 1 unit PRBCs last week.\par Diarrhea is same as before. has an appt with GI next week.\par No fever\par \par 3/30/21\par Pt is here for follow up.\par Feels better. Last transfusion was on 3/2/21\par Saw GI and was started on cholestyramine and metformin was DCED with resolution of diarrhea.\par She denies any SOB,\par Fatigue is better\par \par 4/20/2021\par Pt is here to f/u for MDS\par She is s/p Luspatercept cycle #6\par She is compliant with Aspirin\par She feels better today, appetite is good\par She denies shortness of breath, fatigue, or weakness \par She c/o of diarrhea but it has improved since she was started on Cholestyramine. Stool is soft and less in frequency\par She received COVID vaccine x 2 doses\par \par 5/11/21\par pt is here for follow up.\par Feels the same with fatigue, diarrhea.\par No SOB\par \par 6/21/21\par Pt is here for follow up.\par Feels well. No SOB, CP, N< V.\par Diarrhea is better controlled now.\par Last PRBC transfusion was 2 weeks ago.\par \par 7/19/21\par Pt is here for follow up.\par Feels a little tired, last transfusion was 6/1/21.\par No bleeding, fever, SOB\par \par 8/10/2021\par Patient is here to follow up for her MDS.\par She is on Luspatercept, tolerating well.\par She feels well today, the appetite is good.\par She denies shortness of breath, fatigue, fever, night sweats, abdominal pain, arthralgias/myalgias.\par \par 8/31/21\par Pt is here for follow up.\par C/O feeling very fatigued.\par No bleeding.\par No fever.\par \par 9/21/2021\par Patient is here to follow up for MDS.\par She is on Luspatercept and Retacrit, tolerating well.\par She gets blood transfusion as needed for Hgb <7 gm/dL\par She is c/o of fatigue, no shortness of breath, dizziness, chills or lightheadedness.\par She denies melena or hematochezia.\par Her appetite is good, no nausea/vomiting.\par \par 10/28/2021\par Patient is here to follow up for MDS.\par She is on Luspatercept and Retacrit, tolerating well.\par She gets blood transfusion to keep Hgb > 7 gm/dL.\par She is c/o of chronic fatigue, no shortness of breath, dizziness, chills or lightheadedness.\par She denies melena or hematochezia.\par Her appetite is good, no nausea/vomiting.\par She c/o of severe diarrhea, 10-12x/day watery stool while on Imodium in the last 2 weeks.\par Last colonoscopy was in 2016, benign as per patient.\par \par 11/18/21\par Pt is here for follow up. C/O fatigue. No SOB, CP\par \par 12/9/21\par Pt is here for follow up.\par Feels better today. Recd 2 units last week in ER.\par Planning to go to [place] for over a week and does not want to miss her procrit dose.\par Diarrhea is stable,\par \par 12/23/21\par Pt is here for follow up.\par Feels better. No SOB, CP. Going to [place] tomorrow. Has not been able to get Procrit injection from the pharmacy yet d/t insurance issues\par \par 1/13/2022\par Patient is here to follow up for MDS/MPN.\par She c/o of chronic fatigue, appetite is good.\par She denies shortness of breath, chest pain, fever or chills. [de-identified] : This is a 75 year old Black female with history of MDS. She's been on treatment with Revlimid 5 mg, 2 weeks on, 1 week off.  \par She is also on Procrit.\par  She underwent colonoscopy (2/2017)  Results noted no colitis, only hyperplastic polyp noted. \par  \par She has C/O abdominal pain and passage of mucus per rectum.\par She is planning to follow with her GI.\par Other than that she feels well.\par She is compliant with Revlimid .

## 2022-01-13 NOTE — PHYSICAL EXAM
[Normal] : affect appropriate [Ambulatory and capable of all self care but unable to carry out any work activities] : Status 2- Ambulatory and capable of all self care but unable to carry out any work activities. Up and about more than 50% of waking hours [de-identified] : + pallor

## 2022-01-13 NOTE — REVIEW OF SYSTEMS
[Fatigue] : fatigue [Recent Change In Weight] : ~T no recent weight change [Shortness Of Breath] : no shortness of breath [Diarrhea] : no diarrhea [Negative] : Gastrointestinal

## 2022-01-13 NOTE — ASSESSMENT
[FreeTextEntry1] : Patient is a 76 year old female with # Lowrisk myelodysplastic syndrome, previously treated with Revlimid and Procrit and Vidaza . 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 \par Patient is s/p  9 cycles of Vidaza and she was requiring pRBC transfusion every 3 weeks \par Given the persistent transfusion dependence she was switched to Luspatercept.\par \par Hyperkalemia likely due to increased platelet; pseudohyperkalemia.\par CBC today reviewed with Hgb 7 gm/dL.\par \par PLAN:\par Previous notes reviewed and all relevant laboratory results and communicated to the patient.\par \par -- Continue Luspatercept 1.75 mg/kg every 3 weeks, due today.\par -- Continue Procrit 80,000 units every week.\par Side effects of Procrit discussed including but not limited to: hypertension, headache, pruritus, nausea, vomiting, injection site pain, arthralgia, cough, fever.\par -- Monitor CBC weekly and administer transfusions as needed to keep Hgb >7 gm/dL.\par -- She was advised to go to the ER for pRBC transfusions but she refused. Will repeat CBC on Monday, 1/17/2022 with possible blood transfusion. She was instructed to go to the ER if she has shortness of breath, chest pain, palpitations, chills or worsening of fatigue and she expressed understanding.\par \par # Vitamin  B 12 deficiency\par -- Continue Vitamin B 12 injection every month.\par \par # Thrombocytosis\par -- S/P Hydrea\par -- Will continue to monitor.\par \par RTC in 3 weeks\par \par \par \par

## 2022-01-14 LAB
HCT VFR BLD CALC: 22 %
HGB BLD-MCNC: 7 G/DL
MCHC RBC-ENTMCNC: 30.2 PG
MCHC RBC-ENTMCNC: 31.8 G/DL
MCV RBC AUTO: 94.8 FL
PLATELET # BLD AUTO: 701 K/UL
PMV BLD: 10.3 FL
RBC # BLD: 2.32 M/UL
RBC # FLD: 20.9 %
WBC # FLD AUTO: 6.22 K/UL

## 2022-01-18 ENCOUNTER — LABORATORY RESULT (OUTPATIENT)
Age: 77
End: 2022-01-18

## 2022-01-18 ENCOUNTER — APPOINTMENT (OUTPATIENT)
Dept: INFUSION THERAPY | Facility: CLINIC | Age: 77
End: 2022-01-18

## 2022-01-18 ENCOUNTER — APPOINTMENT (OUTPATIENT)
Dept: HEMATOLOGY ONCOLOGY | Facility: CLINIC | Age: 77
End: 2022-01-18
Payer: MEDICARE

## 2022-01-18 LAB
HCT VFR BLD CALC: 18.5 %
HGB BLD-MCNC: 6.1 G/DL
MCHC RBC-ENTMCNC: 30.5 PG
MCHC RBC-ENTMCNC: 33 G/DL
MCV RBC AUTO: 92.5 FL
PLATELET # BLD AUTO: 578 K/UL
PMV BLD: 10.2 FL
RBC # BLD: 2 M/UL
RBC # FLD: 22 %
WBC # FLD AUTO: 7.42 K/UL

## 2022-01-18 PROCEDURE — 99212 OFFICE O/P EST SF 10 MIN: CPT

## 2022-01-19 LAB
ABO + RH PNL BLD: NORMAL
BLD GP AB SCN SERPL QL: NORMAL

## 2022-01-20 ENCOUNTER — APPOINTMENT (OUTPATIENT)
Dept: INFUSION THERAPY | Facility: CLINIC | Age: 77
End: 2022-01-20

## 2022-01-20 ENCOUNTER — LABORATORY RESULT (OUTPATIENT)
Age: 77
End: 2022-01-20

## 2022-01-20 LAB
HCT VFR BLD CALC: 19.3 %
HGB BLD-MCNC: 6.7 G/DL
MCHC RBC-ENTMCNC: 31.2 PG
MCHC RBC-ENTMCNC: 34.7 G/DL
MCV RBC AUTO: 89.8 FL
PLATELET # BLD AUTO: 551 K/UL
PMV BLD: 11.3 FL
RBC # BLD: 2.15 M/UL
RBC # FLD: 21.6 %
WBC # FLD AUTO: 7.55 K/UL

## 2022-01-20 RX ORDER — ERYTHROPOIETIN 10000 [IU]/ML
80000 INJECTION, SOLUTION INTRAVENOUS; SUBCUTANEOUS ONCE
Refills: 0 | Status: COMPLETED | OUTPATIENT
Start: 2022-01-20 | End: 2022-01-20

## 2022-01-20 RX ADMIN — ERYTHROPOIETIN 80000 UNIT(S): 10000 INJECTION, SOLUTION INTRAVENOUS; SUBCUTANEOUS at 13:02

## 2022-01-25 ENCOUNTER — LABORATORY RESULT (OUTPATIENT)
Age: 77
End: 2022-01-25

## 2022-01-25 ENCOUNTER — APPOINTMENT (OUTPATIENT)
Dept: HEMATOLOGY ONCOLOGY | Facility: CLINIC | Age: 77
End: 2022-01-25
Payer: MEDICARE

## 2022-01-25 LAB
HCT VFR BLD CALC: 22.4 %
HGB BLD-MCNC: 7.4 G/DL
MCHC RBC-ENTMCNC: 30.7 PG
MCHC RBC-ENTMCNC: 33 G/DL
MCV RBC AUTO: 92.9 FL
PLATELET # BLD AUTO: 407 K/UL
PMV BLD: 10.9 FL
RBC # BLD: 2.41 M/UL
RBC # FLD: 19.9 %
WBC # FLD AUTO: 6.88 K/UL

## 2022-01-25 PROCEDURE — 99213 OFFICE O/P EST LOW 20 MIN: CPT

## 2022-01-27 ENCOUNTER — APPOINTMENT (OUTPATIENT)
Dept: HEMATOLOGY ONCOLOGY | Facility: CLINIC | Age: 77
End: 2022-01-27

## 2022-01-27 ENCOUNTER — APPOINTMENT (OUTPATIENT)
Dept: INFUSION THERAPY | Facility: CLINIC | Age: 77
End: 2022-01-27

## 2022-01-27 ENCOUNTER — LABORATORY RESULT (OUTPATIENT)
Age: 77
End: 2022-01-27

## 2022-01-27 RX ORDER — ERYTHROPOIETIN 10000 [IU]/ML
80000 INJECTION, SOLUTION INTRAVENOUS; SUBCUTANEOUS ONCE
Refills: 0 | Status: COMPLETED | OUTPATIENT
Start: 2022-01-27 | End: 2022-01-27

## 2022-01-27 RX ADMIN — ERYTHROPOIETIN 80000 UNIT(S): 10000 INJECTION, SOLUTION INTRAVENOUS; SUBCUTANEOUS at 16:22

## 2022-01-28 LAB
HCT VFR BLD CALC: 20.6 %
HGB BLD-MCNC: 7.1 G/DL
MCHC RBC-ENTMCNC: 31.1 PG
MCHC RBC-ENTMCNC: 34.5 G/DL
MCV RBC AUTO: 90.4 FL
PLATELET # BLD AUTO: 360 K/UL
PMV BLD: 11.9 FL
RBC # BLD: 2.28 M/UL
RBC # FLD: 20.3 %
WBC # FLD AUTO: 7.21 K/UL

## 2022-02-03 ENCOUNTER — LABORATORY RESULT (OUTPATIENT)
Age: 77
End: 2022-02-03

## 2022-02-03 ENCOUNTER — APPOINTMENT (OUTPATIENT)
Dept: INFUSION THERAPY | Facility: CLINIC | Age: 77
End: 2022-02-03
Payer: MEDICARE

## 2022-02-03 ENCOUNTER — APPOINTMENT (OUTPATIENT)
Dept: HEMATOLOGY ONCOLOGY | Facility: CLINIC | Age: 77
End: 2022-02-03
Payer: MEDICARE

## 2022-02-03 VITALS
SYSTOLIC BLOOD PRESSURE: 123 MMHG | HEART RATE: 125 BPM | RESPIRATION RATE: 18 BRPM | TEMPERATURE: 97.9 F | BODY MASS INDEX: 25.4 KG/M2 | DIASTOLIC BLOOD PRESSURE: 67 MMHG | WEIGHT: 138.03 LBS | HEIGHT: 62 IN

## 2022-02-03 LAB
HCT VFR BLD CALC: 19.6 %
HGB BLD-MCNC: 6.4 G/DL
MCHC RBC-ENTMCNC: 31.4 PG
MCHC RBC-ENTMCNC: 32.7 G/DL
MCV RBC AUTO: 96.1 FL
PLATELET # BLD AUTO: 554 K/UL
PMV BLD: 10.7 FL
RBC # BLD: 2.04 M/UL
RBC # FLD: 20.7 %
WBC # FLD AUTO: 6.93 K/UL

## 2022-02-03 PROCEDURE — 99215 OFFICE O/P EST HI 40 MIN: CPT

## 2022-02-03 RX ORDER — LUSPATERCEPT 75 MG/1
110 INJECTION, POWDER, LYOPHILIZED, FOR SOLUTION SUBCUTANEOUS ONCE
Refills: 0 | Status: COMPLETED | OUTPATIENT
Start: 2022-02-03 | End: 2022-02-03

## 2022-02-03 RX ORDER — ERYTHROPOIETIN 10000 [IU]/ML
80000 INJECTION, SOLUTION INTRAVENOUS; SUBCUTANEOUS ONCE
Refills: 0 | Status: COMPLETED | OUTPATIENT
Start: 2022-02-03 | End: 2022-02-03

## 2022-02-03 RX ADMIN — LUSPATERCEPT 110 MILLIGRAM(S): 75 INJECTION, POWDER, LYOPHILIZED, FOR SOLUTION SUBCUTANEOUS at 14:08

## 2022-02-03 RX ADMIN — ERYTHROPOIETIN 80000 UNIT(S): 10000 INJECTION, SOLUTION INTRAVENOUS; SUBCUTANEOUS at 14:08

## 2022-02-03 NOTE — CONSULT LETTER
[Dear  ___] : Dear ~GONZALES, [Courtesy Letter:] : I had the pleasure of seeing your patient, [unfilled], in my office today. [Please see my note below.] : Please see my note below. [Consult Closing:] : Thank you very much for allowing me to participate in the care of this patient.  If you have any questions, please do not hesitate to contact me. [Sincerely,] : Sincerely, [DrBakari  ___] : Dr. VOGT [FreeTextEntry2] : Dr. David Torres [FreeTextEntry3] : Nioclás Andres NP

## 2022-02-03 NOTE — HISTORY OF PRESENT ILLNESS
[de-identified] : She missed on appointment for procrit last week.\par She starts her next cycle on 6/25/18\par C/o back pain radiating down her left which occurred when she bent to  something.\par No change in diarrhea.\par \par 7/31/18:\par Doing well. On Revlimid for more than 2yrs, 5 mg 2 weeks on 1 week off. She will be starting week on tonight. \par C/o diarrhea. On Imodium 2 pills AM and 2 pills PM. Doesn't help much with diarrhea. \par C/o nausea, abdominal pain. \par \par \par Colonoscopy in 2016 was normal. \par Did not have blood transfusion for over 2 years. \par On procrit [id number] weekly. Missed last week on 7/24/18 as she was out of town. She has been non compliant with weekly Procrit.\par Mammogram in 2017 was normal. \par \par 9/11/18\par pt is here for follow up.\par She feels the lomotil helps with the diarrhea.\par She was away for a few weeks and missed her procrit injections.\par No fever, abdominal  discomfort has been less since since use of lomotil.\par She started a new cycle 2 days ago.\par \par 10/2/18:\par She will start Revlimid tonight (week on). 2 weeks on, 1 week off. \par On ASA 81mg. \par Denies fever, nausea, vomiting, chest pain, SOB, abdominal pain, and bladder problems.\par C/o diarrhea. \par S/p 2 units PRBC transfusion on 9/25/18. \par On Procrit 17806 units weekly. Not compliant every week. \par C/o intermittent dizziness. \par \par 10/31/18\par Pt is here for follow up.\par C/O significant fatigue.\par Continues to have diarrhea, takes imodium and lomotil as needed.\par C/o dry cough, no fever.\par Cough started a month ago. It is worse in the mornings however over last week it has been constant all day.\par No chest pain.\par She was found to have low B12 levels and was started on B12 injections.\par \par 11/27/18:\par Doing well. Hospitalized for 3 days for cellulitis/abcess of the back s/p drainage and on Abx currently. Developed 3 days after Mg infusion as per pt. \par Denies nausea, vomiting, chest pain, SOB, abdominal pain, bowel and bladder problems.\par This is the week on Revlimid (week 1).\par S/p 1 unit PRBC transfusion in the hospital. \par On Procrit weekly \par \par 12/27/18:\par Doing well. No major complaints.\par Denies fever, nausea, vomiting, chest pain, SOB, abdominal pain, and bladder problems.\par On Revlimid 5 mg 2 weeks on 1 week off. This is the week off. She will start the week on sunday (12/30/18).\par Due for Procrit 14333 units today. \par Still has diarrhea. 4-5bm/day.\par On monthly B12 injections. \par \par 1/30/19:\par Patient here for follow up for MDS.  She is taking Revlimid 5 mg, 2 weeks on, 1 week off.  She will complete this current cycle on Saturday.  She has no new complaints today.  Patient denies cough, shortness of breath, denies fever, denies bone pain.\par \par 03/04/2019\par Patient is here for a follow up visit. She complaints of severe pain in her left shoulder and has had abscess drained 2 weeks ago. However the pain is worse and she has purulent discharge on examination. She also has Nasal sores that are painful. Culture from 11/2018 showed MRSA.  Denies Fever. \par She also complaints of swelling in her right leg. Not tender and not erythematous and negative Gong;s sign. \par Her diarrhea with Revlimid is ongoing and Imodium and Lomotil helps but not everyday. \par She is on 60,000 units of procrit weekly and Revlimid 5 mg 2 weeks on 1 week off. \par \par 4/23/19\par Pt is here for follow up.\par She feels well.\par The nasal sores have improved with bactroban\par The abscess on left shoulder has resolved.\par However she has a new one on the middle of her back.\par No fever.\par Pt has been on procrit 48659 units weekly, however she missed a dose in between.\par \par 5/8/19\par pt is here for follow up.\par no new complaints.\par feels well.\par Her skin abscess has resolved.\par she has been unable to go to ID, as she could not get an appt.\par No bleeding.\par She is compliant with revlimid.\par \par 6/6/19\par Pt is here for follow up.\par no new complaints \par Feels well.\par Is on week 2 of her Revlimid cycle. \par No transfusions since last visit\par \par 7/17/19\par Pt is here for follow up.\par C/O fatigue.\par Was away for a few days two weeks ago, but missed treatment with procrit for 2 weeks.\par Is complaint with Revlimid 2 weeks on 1 week off.\par Diarrhea is a little improved.\par \par 8/14/19\par Patient here for follow up visit, feeling well.  Although she is complaining of some pain at the left scapula area recently.  Patient denies cough, shortness of breath, denies fever, denies other bone pain.  She is off Revlimid  this week, due to restart on Monday.  She is scheduled for Procrit and Vitamin B12 injection today.  She plans to leave the country tomorrow to visit her mother who is ill.  She will return in about 10 days.\par \par 9/11/19\par Pt is here for follow up.\par She was away for 3 weeks, out of which she took procrit for 2 weeks in [place].\par She missed last week's dose.\par She had CBCs in [place] which showed Hgb of 8.9\par She feels well.\par She still getting diarrhea.\par She has been using lomotil with very minimal relief.\par She started a new cycle of Revlimid 4 days ago.\par \par 10/3/19\par Patient here for follow up visit, feeling fairly well.   Patient denies cough, shortness of breath, denies fever, denies other bone pain.  She remains on Revlimid.  She is scheduled for Procrit and Vitamin B12 injection today.\par \par 10/24/19\par Pt is here for follow up.\par Feels well.\par No SOB, fatigue.\par Compliant with procrit and Revl;imid.\par Remains on ASa.\par Diarrhea is unchanged.\par Pt takes imodium as needed.\par \par 11/14/19\par Pt is here for follow up.\par No new complaints.\par Starts a new cycle of Revlimid today.\par Diarrhea is same as before, no rectal bleeding.\par No fever.\par \par 12/5/19\par Pt is here for follow up.\par No new complaints.\par Denies feeling weaker than usual.\par No fever, SOB.\par No change in frequency of diarrhea.\par No pain.\par Will start next cycle of Revlimid in 3 days.\par \par \par !/16/20\par Pt was recently DC[facility] 3 days ago after being treated for pneumonia and MARCO.\par She is on po Levaquin.\par She restarted Revlmid 3 days ago.\par She is feeling better now. No fever.\par No SOB, Chest pain and back pain has resolved.\par Pt has a cough, no expectoration.\par She also recd a unit of PRBCs last week in the hospital\par \par \par 1/30/20\par Patient here for follow up visit, feeling well.  She has no new complaints. Cough is almost gone completely.  Patient denies shortness of breath, denies fever, denies bone pain.  Her appetite is ok, weight is stable.  She is due to restart Revlimid on Monday.\par \par 02/20/20\par Pt is here for follow up, feeling well.\par Offers no new complaints. Denies SOB, fever, chills, weight loss, CP, cough. \par Currently off week of Revlimid 5 mg. Restarts on Monday.\par Has procrit 80,000 units today. Next b 12 injection due in 2 weeks \par Did not get chest Xray\par CBC reviewed WBC 3.1, h/h 8.3/25%, plt 386, neutrophils 1.29\par \par 3/12/20\par Pt is here for follow up.\par She took Revlimid for 2 weeks and then has been off for one week although she was advised to take it daily without break.\par No SOB, Cough, fever.\par Has mild fatigue.\par \par 04/02/2020\par SIMONA KUHN a 75 year F is here today for follow up visit of MDS.\par Denies fever, chills, cough,night sweats, weight loss. \par Denies bleeding or bruising.\par Pt receives procrit 80,000 units weekly and B12 IM monthly. \par Continued Revlimid 5 mg daily x 3 weeks, has 1 dose left tonight. \par CBC reviewed: WBC 3.2, H/H 8.1/25.2, neutrophils 1.6, PLT 72\par \par 4/20/2020: The patient is here for follow up . She has no complaints of fever, chills, dizziness , chest pain and shortness of breath . She is still with diarrhea , up to 10 watery BMs per day, responds poorly to imodium and lomotil. She is on Revlimid 5 mg daily , took last dose yesterday night. Her last Procrit was on April 13. \par \par 5/26/20\par Pt is here for follow up.\par She is feeling well. Denies SOB, chest pain, fever.\par Continues to have diarrhea although she is off of Revlmid.\par Thinks it may due to diabetic meds.\par Wants to discuss BM bx results\par \par 6/22/20\par Pt is here for follow up.\par Feels well. Denies any fever, N, V, D\par Continues to have diarrhea, she will talk to her PCP about changing metformin for DM.\par Has been needing PRBCs 1 unit each month\par \par 7/20/20\par Pt is here for follow up.\par No new complaints. Has been feeling well.\par No SOB, CP. \par No N, V, D.\par She has recd 2 units of PRBCs since May 20.\par \par 8/17/20\par Pt is here for a follow up visit.\par She is feeling well.\par No new symptoms. No N, V, D.\par No bruising or bleeding. She continues to need PRBCs every 2-3 weeks.\par \par 8/31/20\par Pt is here for follow up. She is feeling well. No N, V, D.\par She is tolerating the hydrea well. No SOB, CP\par No bruising ior bleeding\par \par 9/8/20\par Pt is here for follow up.\par She had been contacted by the [facility] last week to advise her to stop the hydrea. Pt did not check her messages and continued with Hydrea.\par No fever, N, V, bleeding.\par Saw Dr Ferrell last week.\par \par 9/14/20\par Pt is here for folow up.\par Besides her usual complaint of diarrhea she is feeling well.\par Recd 1 unit PRBC last week.\par Has stopped Hydrea.\par No fever, mouth sores.\par \par 9/29/20\par Pt is here for folow up.\par No new complaints.\par Is seeing GI for diarrhea net week.\par No fever, night sweats.\par REcd 3 units of PRBC this month\par \par 10/13/20\par Pt is here for follow up.\par No new complaints.\par Has not needed a transfusion since 3 weeks ago.\par Continues to have diarrhea, waiting to be seen by GI\par \par 10/26/20\par Pt is here for follow up.\par C/O feeling fatigued.\par Has CLARK.\par No nausea or vomiting.\par No fever.\par Diarrhea has improved a little. She is no longer on Metformin\par \par 11/9/20\par Pt is here for follow up.\par Feels well. Denies SOB, CP, fatigue\par \par 12/7/20- Patient is for follow up and is due for cycle 9 of Vidaza.  She still has loose BM sometimes 10 times a day and was seeing Dr. Corey from GI- did not follow up recently and is unable to get an appt with him. She has lost about 10 lbs since September. No N/V. No fever or chills. No CP/SOB. She has been getting PRBC transfusion every 3 weeks now\par \par 01/04/20 Pt presented today for f/u visit . She is s/p 8 cycles of vidaza and was started on Luspatercept in Dec , 2020 . So far she received 1 injection , she is due for 2nd injection today . Adverse effect profile was discussed with her in detail , she reports having some dizziness and weakness after first injection . She received blood transfusion last wk . Blood work from today reviewed as well and counts are adequate . She denies any fevers,  chills,  or any new symptoms . \par \par 1/25/21\par Pt is here for follow up.\par C/O diarrhea, otherwise feeling better.\par Has not needed a transfusion since 12/29\par \par 2/16/21\par Pt is here for follow up.\par Needed transfusion on 2/8/21.\par Continues to C/o fatigue. Diarrhea remains the same, has not made GI appt as yet.\par \par 3/8/21\par Pt is here for follow up.\par Feels better today. Recd 1 unit PRBCs last week.\par Diarrhea is same as before. has an appt with GI next week.\par No fever\par \par 3/30/21\par Pt is here for follow up.\par Feels better. Last transfusion was on 3/2/21\par Saw GI and was started on cholestyramine and metformin was DCED with resolution of diarrhea.\par She denies any SOB,\par Fatigue is better\par \par 4/20/2021\par Pt is here to f/u for MDS\par She is s/p Luspatercept cycle #6\par She is compliant with Aspirin\par She feels better today, appetite is good\par She denies shortness of breath, fatigue, or weakness \par She c/o of diarrhea but it has improved since she was started on Cholestyramine. Stool is soft and less in frequency\par She received COVID vaccine x 2 doses\par \par 5/11/21\par pt is here for follow up.\par Feels the same with fatigue, diarrhea.\par No SOB\par \par 6/21/21\par Pt is here for follow up.\par Feels well. No SOB, CP, N< V.\par Diarrhea is better controlled now.\par Last PRBC transfusion was 2 weeks ago.\par \par 7/19/21\par Pt is here for follow up.\par Feels a little tired, last transfusion was 6/1/21.\par No bleeding, fever, SOB\par \par 8/10/2021\par Patient is here to follow up for her MDS.\par She is on Luspatercept, tolerating well.\par She feels well today, the appetite is good.\par She denies shortness of breath, fatigue, fever, night sweats, abdominal pain, arthralgias/myalgias.\par \par 8/31/21\par Pt is here for follow up.\par C/O feeling very fatigued.\par No bleeding.\par No fever.\par \par 9/21/2021\par Patient is here to follow up for MDS.\par She is on Luspatercept and Retacrit, tolerating well.\par She gets blood transfusion as needed for Hgb <7 gm/dL\par She is c/o of fatigue, no shortness of breath, dizziness, chills or lightheadedness.\par She denies melena or hematochezia.\par Her appetite is good, no nausea/vomiting.\par \par 10/28/2021\par Patient is here to follow up for MDS.\par She is on Luspatercept and Retacrit, tolerating well.\par She gets blood transfusion to keep Hgb > 7 gm/dL.\par She is c/o of chronic fatigue, no shortness of breath, dizziness, chills or lightheadedness.\par She denies melena or hematochezia.\par Her appetite is good, no nausea/vomiting.\par She c/o of severe diarrhea, 10-12x/day watery stool while on Imodium in the last 2 weeks.\par Last colonoscopy was in 2016, benign as per patient.\par \par 11/18/21\par Pt is here for follow up. C/O fatigue. No SOB, CP\par \par 12/9/21\par Pt is here for follow up.\par Feels better today. Recd 2 units last week in ER.\par Planning to go to [place] for over a week and does not want to miss her procrit dose.\par Diarrhea is stable,\par \par 12/23/21\par Pt is here for follow up.\par Feels better. No SOB, CP. Going to [place] tomorrow. Has not been able to get Procrit injection from the pharmacy yet d/t insurance issues\par \par 2/3/22\par Pt is here for follow up. Feeling same as usual. No SOB, CP. Last transfusion was 2 weeks ago in ER>\par No bleeding reported [de-identified] : \par

## 2022-02-03 NOTE — ASSESSMENT
[FreeTextEntry1] : Patient is a 76 year old female with # Lowrisk myelodysplastic syndrome, previously treated with Revlimid and Procrit and Vidaza . 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 \par Patient is s/p  9 cycles of Vidaza and she was requiring pRBC transfusion every 3 weeks \par Given the persistent transfusion dependence she was switched to Luspatercept.\par \par Hyperkalemia likely due to increased platelet; pseudohyperkalemia.\par \par PLAN:\par -- Continue Luspatercept 1.75 mg/kg every 3 weeks, \par --Continue Procrit 80,000 units every week.\par Side effects of Procrit discussed including but not limited to: hypertension, headache, pruritus, nausea, vomiting, injection site pain, arthralgia, cough, fever.\par -- Monitor CBC weekly and administer transfusions as needed to keep Hgb >7 gm/dL. Todays Hgb is 6.4, arranged for pt to receive 1 unit PRBC \par \par \par # Vitamin  B 12 deficiency\par -- Continue Vitamin B 12 injection every month\par \par # Thrombocytosis\par -- S/P Hydrea\par -- Will continue to monitor.\par \par Discussed with pt that her transfusion requirement is increasing, we may need to rpt BM biopsy at next visit.\par Labs Ferritin, TIBC, FE  folate ordered\par \par RTC in 3 weeks with next Luspatercept injection\par \par \par \par

## 2022-02-03 NOTE — PHYSICAL EXAM
[Restricted in physically strenuous activity but ambulatory and able to carry out work of a light or sedentary nature] : Status 1- Restricted in physically strenuous activity but ambulatory and able to carry out work of a light or sedentary nature, e.g., light house work, office work [Normal] : affect appropriate [de-identified] : pallor +

## 2022-02-04 ENCOUNTER — APPOINTMENT (OUTPATIENT)
Dept: INFUSION THERAPY | Facility: CLINIC | Age: 77
End: 2022-02-04

## 2022-02-07 LAB — ABO + RH PNL BLD: NORMAL

## 2022-02-10 ENCOUNTER — APPOINTMENT (OUTPATIENT)
Dept: INFUSION THERAPY | Facility: CLINIC | Age: 77
End: 2022-02-10

## 2022-02-10 ENCOUNTER — LABORATORY RESULT (OUTPATIENT)
Age: 77
End: 2022-02-10

## 2022-02-10 LAB
ALBUMIN SERPL ELPH-MCNC: 4.2 G/DL
ALP BLD-CCNC: 145 U/L
ALT SERPL-CCNC: 37 U/L
ANION GAP SERPL CALC-SCNC: 12 MMOL/L
AST SERPL-CCNC: 25 U/L
BILIRUB SERPL-MCNC: 0.7 MG/DL
BUN SERPL-MCNC: 21 MG/DL
CALCIUM SERPL-MCNC: 8.7 MG/DL
CHLORIDE SERPL-SCNC: 102 MMOL/L
CO2 SERPL-SCNC: 17 MMOL/L
CREAT SERPL-MCNC: 1.4 MG/DL
GLUCOSE SERPL-MCNC: 346 MG/DL
HCT VFR BLD CALC: 22.5 %
HGB BLD-MCNC: 7.1 G/DL
IRON SERPL-MCNC: 159 UG/DL
MCHC RBC-ENTMCNC: 28.9 PG
MCHC RBC-ENTMCNC: 31.6 G/DL
MCV RBC AUTO: 91.5 FL
PLATELET # BLD AUTO: 417 K/UL
PMV BLD: 10.6 FL
POTASSIUM SERPL-SCNC: 6.2 MMOL/L
PROT SERPL-MCNC: 5.8 G/DL
RBC # BLD: 2.46 M/UL
RBC # FLD: 19.8 %
SODIUM SERPL-SCNC: 131 MMOL/L
TIBC SERPL-MCNC: <176 UG/DL
UIBC SERPL-MCNC: <17 UG/DL
WBC # FLD AUTO: 4.02 K/UL

## 2022-02-10 RX ORDER — ERYTHROPOIETIN 10000 [IU]/ML
80000 INJECTION, SOLUTION INTRAVENOUS; SUBCUTANEOUS ONCE
Refills: 0 | Status: COMPLETED | OUTPATIENT
Start: 2022-02-10 | End: 2022-04-14

## 2022-02-10 RX ORDER — PREGABALIN 225 MG/1
1000 CAPSULE ORAL ONCE
Refills: 0 | Status: COMPLETED | OUTPATIENT
Start: 2022-02-10 | End: 2022-02-10

## 2022-02-10 RX ADMIN — PREGABALIN 1000 MICROGRAM(S): 225 CAPSULE ORAL at 13:02

## 2022-02-10 RX ADMIN — ERYTHROPOIETIN 80000 UNIT(S): 10000 INJECTION, SOLUTION INTRAVENOUS; SUBCUTANEOUS at 13:02

## 2022-02-11 ENCOUNTER — APPOINTMENT (OUTPATIENT)
Dept: HEMATOLOGY ONCOLOGY | Facility: CLINIC | Age: 77
End: 2022-02-11
Payer: MEDICARE

## 2022-02-11 ENCOUNTER — EMERGENCY (EMERGENCY)
Facility: HOSPITAL | Age: 77
LOS: 0 days | Discharge: HOME | End: 2022-02-11
Attending: EMERGENCY MEDICINE | Admitting: EMERGENCY MEDICINE
Payer: MEDICARE

## 2022-02-11 VITALS
HEART RATE: 86 BPM | RESPIRATION RATE: 16 BRPM | DIASTOLIC BLOOD PRESSURE: 73 MMHG | OXYGEN SATURATION: 100 % | WEIGHT: 138.89 LBS | SYSTOLIC BLOOD PRESSURE: 178 MMHG | HEIGHT: 62 IN | TEMPERATURE: 98 F

## 2022-02-11 VITALS
HEART RATE: 82 BPM | SYSTOLIC BLOOD PRESSURE: 169 MMHG | OXYGEN SATURATION: 98 % | RESPIRATION RATE: 18 BRPM | DIASTOLIC BLOOD PRESSURE: 71 MMHG

## 2022-02-11 DIAGNOSIS — E11.9 TYPE 2 DIABETES MELLITUS WITHOUT COMPLICATIONS: ICD-10-CM

## 2022-02-11 DIAGNOSIS — E87.5 HYPERKALEMIA: ICD-10-CM

## 2022-02-11 DIAGNOSIS — D64.9 ANEMIA, UNSPECIFIED: ICD-10-CM

## 2022-02-11 DIAGNOSIS — Z79.84 LONG TERM (CURRENT) USE OF ORAL HYPOGLYCEMIC DRUGS: ICD-10-CM

## 2022-02-11 LAB
ANION GAP SERPL CALC-SCNC: 12 MMOL/L
ANION GAP SERPL CALC-SCNC: 14 MMOL/L — SIGNIFICANT CHANGE UP (ref 7–14)
BASE EXCESS BLDV CALC-SCNC: -5.3 MMOL/L — LOW (ref -2–3)
BASOPHILS # BLD AUTO: 0.08 K/UL — SIGNIFICANT CHANGE UP (ref 0–0.2)
BASOPHILS NFR BLD AUTO: 1.5 % — HIGH (ref 0–1)
BUN SERPL-MCNC: 23 MG/DL — HIGH (ref 10–20)
BUN SERPL-MCNC: 24 MG/DL
CA-I SERPL-SCNC: 1.3 MMOL/L — SIGNIFICANT CHANGE UP (ref 1.15–1.33)
CALCIUM SERPL-MCNC: 8.7 MG/DL
CALCIUM SERPL-MCNC: 9 MG/DL — SIGNIFICANT CHANGE UP (ref 8.5–10.1)
CHLORIDE SERPL-SCNC: 101 MMOL/L
CHLORIDE SERPL-SCNC: 101 MMOL/L — SIGNIFICANT CHANGE UP (ref 98–110)
CO2 SERPL-SCNC: 17 MMOL/L — SIGNIFICANT CHANGE UP (ref 17–32)
CO2 SERPL-SCNC: 18 MMOL/L
CREAT SERPL-MCNC: 1.3 MG/DL
CREAT SERPL-MCNC: 1.3 MG/DL — SIGNIFICANT CHANGE UP (ref 0.7–1.5)
EOSINOPHIL # BLD AUTO: 0.06 K/UL — SIGNIFICANT CHANGE UP (ref 0–0.7)
EOSINOPHIL NFR BLD AUTO: 1.2 % — SIGNIFICANT CHANGE UP (ref 0–8)
FOLATE SERPL-MCNC: 10.5 NG/ML
GAS PNL BLDV: 133 MMOL/L — LOW (ref 136–145)
GAS PNL BLDV: SIGNIFICANT CHANGE UP
GLUCOSE SERPL-MCNC: 242 MG/DL — HIGH (ref 70–99)
GLUCOSE SERPL-MCNC: 330 MG/DL
HCO3 BLDV-SCNC: 20 MMOL/L — LOW (ref 22–29)
HCT VFR BLD CALC: 22.8 % — LOW (ref 37–47)
HCT VFR BLDA CALC: 21 % — CRITICAL LOW (ref 39–51)
HGB BLD CALC-MCNC: 7.1 G/DL — LOW (ref 12.6–17.4)
HGB BLD-MCNC: 7.4 G/DL — LOW (ref 12–16)
IMM GRANULOCYTES NFR BLD AUTO: 1.2 % — HIGH (ref 0.1–0.3)
LACTATE BLDV-MCNC: 2.2 MMOL/L — HIGH (ref 0.5–2)
LYMPHOCYTES # BLD AUTO: 1.28 K/UL — SIGNIFICANT CHANGE UP (ref 1.2–3.4)
LYMPHOCYTES # BLD AUTO: 24.7 % — SIGNIFICANT CHANGE UP (ref 20.5–51.1)
MCHC RBC-ENTMCNC: 29.1 PG — SIGNIFICANT CHANGE UP (ref 27–31)
MCHC RBC-ENTMCNC: 32.5 G/DL — SIGNIFICANT CHANGE UP (ref 32–37)
MCV RBC AUTO: 89.8 FL — SIGNIFICANT CHANGE UP (ref 81–99)
MONOCYTES # BLD AUTO: 0.55 K/UL — SIGNIFICANT CHANGE UP (ref 0.1–0.6)
MONOCYTES NFR BLD AUTO: 10.6 % — HIGH (ref 1.7–9.3)
NEUTROPHILS # BLD AUTO: 3.16 K/UL — SIGNIFICANT CHANGE UP (ref 1.4–6.5)
NEUTROPHILS NFR BLD AUTO: 60.8 % — SIGNIFICANT CHANGE UP (ref 42.2–75.2)
NRBC # BLD: 0 /100 WBCS — SIGNIFICANT CHANGE UP (ref 0–0)
PCO2 BLDV: 37 MMHG — LOW (ref 39–42)
PH BLDV: 7.34 — SIGNIFICANT CHANGE UP (ref 7.32–7.43)
PLATELET # BLD AUTO: 497 K/UL — HIGH (ref 130–400)
PO2 BLDV: 25 MMHG — SIGNIFICANT CHANGE UP
POTASSIUM BLDV-SCNC: 5.1 MMOL/L — SIGNIFICANT CHANGE UP (ref 3.5–5.1)
POTASSIUM SERPL-MCNC: 5.4 MMOL/L — HIGH (ref 3.5–5)
POTASSIUM SERPL-SCNC: 5.4 MMOL/L — HIGH (ref 3.5–5)
POTASSIUM SERPL-SCNC: 5.9 MMOL/L
RBC # BLD: 2.54 M/UL — LOW (ref 4.2–5.4)
RBC # FLD: 20.2 % — HIGH (ref 11.5–14.5)
SAO2 % BLDV: 41.1 % — SIGNIFICANT CHANGE UP
SODIUM SERPL-SCNC: 131 MMOL/L
SODIUM SERPL-SCNC: 132 MMOL/L — LOW (ref 135–146)
WBC # BLD: 5.19 K/UL — SIGNIFICANT CHANGE UP (ref 4.8–10.8)
WBC # FLD AUTO: 5.19 K/UL — SIGNIFICANT CHANGE UP (ref 4.8–10.8)

## 2022-02-11 PROCEDURE — 93010 ELECTROCARDIOGRAM REPORT: CPT | Mod: 76

## 2022-02-11 PROCEDURE — 99213 OFFICE O/P EST LOW 20 MIN: CPT

## 2022-02-11 PROCEDURE — 99284 EMERGENCY DEPT VISIT MOD MDM: CPT

## 2022-02-11 NOTE — ED PROVIDER NOTE - PROGRESS NOTE DETAILS
AN: Pt was offered 1 unit prbc due to anemia. She declined and will f/up outpt Pt counseled - warning si/sxs d/w pt. Pt instructed to return to ed or f/u with PCP should sxs persist/worsen. Pt verbalizes an understanding & agreement with the plan as discussed

## 2022-02-11 NOTE — ED PROVIDER NOTE - OBJECTIVE STATEMENT
pt with pmhx DM and MDS with resulting chronic anemia was sent in by her hematologist for evaluation of incidental hyperkalemia. K was 6.2 yesterday and 5.9 this AM. Pt has no complaints. Denies fever/chill/HA/dizziness/chest pain/palpitation/sob/abd pain/n/v/d/ black stool/bloody stool/urinary sxs

## 2022-02-11 NOTE — ED PROVIDER NOTE - CLINICAL SUMMARY MEDICAL DECISION MAKING FREE TEXT BOX
Pt sent in for evaluation and management of hyperkalemia. Levels are normal in the ED. Pt to be dced with outpt f/up.

## 2022-02-11 NOTE — ED PROVIDER NOTE - PATIENT PORTAL LINK FT
You can access the FollowMyHealth Patient Portal offered by [facility] by registering at the following website: [email or web address]. By joining [employer]’s FollowMyHealth portal, you will also be able to view your health information using other applications (apps) compatible with our system.

## 2022-02-11 NOTE — ED PROVIDER NOTE - PHYSICAL EXAMINATION
CONSTITUTIONAL: Well-appearing; well-nourished; in no apparent distress.   EYES: PERRL; EOM intact.   NECK: Supple; non-tender; no cervical lymphadenopathy.   CARDIOVASCULAR: Normal S1, S2; no murmurs, rubs, or gallops.   RESPIRATORY: Normal chest excursion with respiration; breath sounds clear and equal bilaterally; no wheezes, rhonchi, or rales.  GI/: non-distended; non-tender; no palpable organomegaly.   MS: No evidence of trauma or deformity. Normal ROM in all four extremities; non-tender to palpation; distal pulses are normal.   SKIN: Normal for age and race; warm; dry; good turgor; no apparent lesions or exudate.   NEURO/PSYCH: A & O x 4; grossly unremarkable. mood and manner are appropriate.

## 2022-02-17 ENCOUNTER — LABORATORY RESULT (OUTPATIENT)
Age: 77
End: 2022-02-17

## 2022-02-17 ENCOUNTER — APPOINTMENT (OUTPATIENT)
Dept: INFUSION THERAPY | Facility: CLINIC | Age: 77
End: 2022-02-17

## 2022-02-17 RX ORDER — ERYTHROPOIETIN 10000 [IU]/ML
80000 INJECTION, SOLUTION INTRAVENOUS; SUBCUTANEOUS ONCE
Refills: 0 | Status: COMPLETED | OUTPATIENT
Start: 2022-02-17 | End: 2022-02-17

## 2022-02-17 RX ADMIN — ERYTHROPOIETIN 80000 UNIT(S): 10000 INJECTION, SOLUTION INTRAVENOUS; SUBCUTANEOUS at 13:45

## 2022-02-24 ENCOUNTER — APPOINTMENT (OUTPATIENT)
Dept: INFUSION THERAPY | Facility: CLINIC | Age: 77
End: 2022-02-24

## 2022-03-03 ENCOUNTER — LABORATORY RESULT (OUTPATIENT)
Age: 77
End: 2022-03-03

## 2022-03-03 ENCOUNTER — APPOINTMENT (OUTPATIENT)
Dept: HEMATOLOGY ONCOLOGY | Facility: CLINIC | Age: 77
End: 2022-03-03
Payer: MEDICARE

## 2022-03-03 ENCOUNTER — EMERGENCY (EMERGENCY)
Facility: HOSPITAL | Age: 77
LOS: 0 days | Discharge: HOME | End: 2022-03-04
Attending: EMERGENCY MEDICINE | Admitting: EMERGENCY MEDICINE
Payer: MEDICARE

## 2022-03-03 ENCOUNTER — APPOINTMENT (OUTPATIENT)
Dept: INFUSION THERAPY | Facility: CLINIC | Age: 77
End: 2022-03-03
Payer: MEDICARE

## 2022-03-03 VITALS
DIASTOLIC BLOOD PRESSURE: 46 MMHG | RESPIRATION RATE: 20 BRPM | BODY MASS INDEX: 25.4 KG/M2 | WEIGHT: 138 LBS | HEIGHT: 62 IN | SYSTOLIC BLOOD PRESSURE: 142 MMHG | HEART RATE: 133 BPM | TEMPERATURE: 98 F

## 2022-03-03 VITALS
RESPIRATION RATE: 18 BRPM | TEMPERATURE: 99 F | HEIGHT: 62 IN | OXYGEN SATURATION: 98 % | SYSTOLIC BLOOD PRESSURE: 137 MMHG | DIASTOLIC BLOOD PRESSURE: 61 MMHG | WEIGHT: 138.89 LBS | HEART RATE: 108 BPM

## 2022-03-03 DIAGNOSIS — R53.83 OTHER FATIGUE: ICD-10-CM

## 2022-03-03 DIAGNOSIS — E11.9 TYPE 2 DIABETES MELLITUS WITHOUT COMPLICATIONS: ICD-10-CM

## 2022-03-03 DIAGNOSIS — D64.9 ANEMIA, UNSPECIFIED: ICD-10-CM

## 2022-03-03 DIAGNOSIS — R53.1 WEAKNESS: ICD-10-CM

## 2022-03-03 LAB
ALBUMIN SERPL ELPH-MCNC: 4 G/DL — SIGNIFICANT CHANGE UP (ref 3.5–5.2)
ALP SERPL-CCNC: 134 U/L — HIGH (ref 30–115)
ALT FLD-CCNC: 20 U/L — SIGNIFICANT CHANGE UP (ref 0–41)
ANION GAP SERPL CALC-SCNC: 18 MMOL/L — HIGH (ref 7–14)
AST SERPL-CCNC: 14 U/L — SIGNIFICANT CHANGE UP (ref 0–41)
BASOPHILS # BLD AUTO: 0.05 K/UL — SIGNIFICANT CHANGE UP (ref 0–0.2)
BASOPHILS NFR BLD AUTO: 1.1 % — HIGH (ref 0–1)
BILIRUB SERPL-MCNC: 0.3 MG/DL — SIGNIFICANT CHANGE UP (ref 0.2–1.2)
BLD GP AB SCN SERPL QL: SIGNIFICANT CHANGE UP
BUN SERPL-MCNC: 30 MG/DL — HIGH (ref 10–20)
CALCIUM SERPL-MCNC: 9.2 MG/DL — SIGNIFICANT CHANGE UP (ref 8.5–10.1)
CHLORIDE SERPL-SCNC: 97 MMOL/L — LOW (ref 98–110)
CO2 SERPL-SCNC: 15 MMOL/L — LOW (ref 17–32)
CREAT SERPL-MCNC: 1.4 MG/DL — SIGNIFICANT CHANGE UP (ref 0.7–1.5)
EGFR: 39 ML/MIN/1.73M2 — LOW
EOSINOPHIL # BLD AUTO: 0 K/UL — SIGNIFICANT CHANGE UP (ref 0–0.7)
EOSINOPHIL NFR BLD AUTO: 0 % — SIGNIFICANT CHANGE UP (ref 0–8)
GLUCOSE SERPL-MCNC: 357 MG/DL — HIGH (ref 70–99)
HCT VFR BLD CALC: 15.7 % — LOW (ref 37–47)
HCT VFR BLD CALC: 17.9 %
HCT VFR BLD CALC: 21.1 %
HGB BLD-MCNC: 5 G/DL — CRITICAL LOW (ref 12–16)
HGB BLD-MCNC: 5.9 G/DL
HGB BLD-MCNC: 7 G/DL
IMM GRANULOCYTES NFR BLD AUTO: 1.1 % — HIGH (ref 0.1–0.3)
LYMPHOCYTES # BLD AUTO: 1.15 K/UL — LOW (ref 1.2–3.4)
LYMPHOCYTES # BLD AUTO: 24.5 % — SIGNIFICANT CHANGE UP (ref 20.5–51.1)
MCHC RBC-ENTMCNC: 29 PG
MCHC RBC-ENTMCNC: 29.1 PG — SIGNIFICANT CHANGE UP (ref 27–31)
MCHC RBC-ENTMCNC: 29.9 PG
MCHC RBC-ENTMCNC: 31.8 G/DL — LOW (ref 32–37)
MCHC RBC-ENTMCNC: 33 G/DL
MCHC RBC-ENTMCNC: 33.2 G/DL
MCV RBC AUTO: 87.6 FL
MCV RBC AUTO: 90.9 FL
MCV RBC AUTO: 91.3 FL — SIGNIFICANT CHANGE UP (ref 81–99)
MONOCYTES # BLD AUTO: 0.67 K/UL — HIGH (ref 0.1–0.6)
MONOCYTES NFR BLD AUTO: 14.3 % — HIGH (ref 1.7–9.3)
NEUTROPHILS # BLD AUTO: 2.78 K/UL — SIGNIFICANT CHANGE UP (ref 1.4–6.5)
NEUTROPHILS NFR BLD AUTO: 59 % — SIGNIFICANT CHANGE UP (ref 42.2–75.2)
NRBC # BLD: 0 /100 WBCS — SIGNIFICANT CHANGE UP (ref 0–0)
PLATELET # BLD AUTO: 484 K/UL — HIGH (ref 130–400)
PLATELET # BLD AUTO: 550 K/UL
PLATELET # BLD AUTO: 674 K/UL
PMV BLD: 10.4 FL
PMV BLD: 11.3 FL
POTASSIUM SERPL-MCNC: 5.3 MMOL/L — HIGH (ref 3.5–5)
POTASSIUM SERPL-SCNC: 5.3 MMOL/L — HIGH (ref 3.5–5)
PROT SERPL-MCNC: 5.8 G/DL — LOW (ref 6–8)
RBC # BLD: 1.72 M/UL — LOW (ref 4.2–5.4)
RBC # BLD: 1.97 M/UL
RBC # BLD: 2.41 M/UL
RBC # FLD: 20.7 %
RBC # FLD: 23.3 % — HIGH (ref 11.5–14.5)
RBC # FLD: 23.9 %
SODIUM SERPL-SCNC: 130 MMOL/L — LOW (ref 135–146)
WBC # BLD: 4.7 K/UL — LOW (ref 4.8–10.8)
WBC # FLD AUTO: 4.7 K/UL — LOW (ref 4.8–10.8)
WBC # FLD AUTO: 7.94 K/UL
WBC # FLD AUTO: 9.38 K/UL

## 2022-03-03 PROCEDURE — 71045 X-RAY EXAM CHEST 1 VIEW: CPT | Mod: 26

## 2022-03-03 PROCEDURE — 99215 OFFICE O/P EST HI 40 MIN: CPT

## 2022-03-03 PROCEDURE — 99220: CPT

## 2022-03-03 PROCEDURE — 93010 ELECTROCARDIOGRAM REPORT: CPT

## 2022-03-03 RX ORDER — ERYTHROPOIETIN 10000 [IU]/ML
80000 INJECTION, SOLUTION INTRAVENOUS; SUBCUTANEOUS ONCE
Refills: 0 | Status: COMPLETED | OUTPATIENT
Start: 2022-03-03 | End: 2022-03-03

## 2022-03-03 RX ORDER — LUSPATERCEPT 75 MG/1
110 INJECTION, POWDER, LYOPHILIZED, FOR SOLUTION SUBCUTANEOUS ONCE
Refills: 0 | Status: COMPLETED | OUTPATIENT
Start: 2022-03-03 | End: 2022-03-03

## 2022-03-03 RX ADMIN — LUSPATERCEPT 110 MILLIGRAM(S): 75 INJECTION, POWDER, LYOPHILIZED, FOR SOLUTION SUBCUTANEOUS at 14:50

## 2022-03-03 RX ADMIN — ERYTHROPOIETIN 80000 UNIT(S): 10000 INJECTION, SOLUTION INTRAVENOUS; SUBCUTANEOUS at 14:49

## 2022-03-03 NOTE — ED ADULT TRIAGE NOTE - CHIEF COMPLAINT QUOTE
Patient was sent in by MD Linn for transfusing of PRBCs as per patient her hgb was 5.8 outpatient and her stool was + for occult blood. Patient denies shortness of breath or blood in stool.

## 2022-03-03 NOTE — ED ADULT TRIAGE NOTE - STATUS:
Intact Cheek Interpolation Flap Text: A decision was made to reconstruct the defect utilizing an interpolation axial flap and a staged reconstruction.  A telfa template was made of the defect.  This telfa template was then used to outline the Cheek Interpolation flap.  The donor area for the pedicle flap was then injected with anesthesia.  The flap was excised through the skin and subcutaneous tissue down to the layer of the underlying musculature.  The interpolation flap was carefully excised within this deep plane to maintain its blood supply.  The edges of the donor site were undermined.   The donor site was closed in a primary fashion.  The pedicle was then rotated into position and sutured.  Once the tube was sutured into place, adequate blood supply was confirmed with blanching and refill.  The pedicle was then wrapped with xeroform gauze and dressed appropriately with a telfa and gauze bandage to ensure continued blood supply and protect the attached pedicle.

## 2022-03-03 NOTE — ED PROVIDER NOTE - PHYSICAL EXAMINATION
CONST: Well appearing in NAD  EYES: PERRL, EOMI, Sclera and conjunctiva clear.   ENT: No nasal discharge. Oropharynx normal appearing  NECK: Non-tender, no meningeal signs. normal ROM. supple   CARD: Normal S1 S2; Normal rate and rhythm  RESP: Equal BS B/L, No wheezes, rhonchi or rales. No distress  GI: Soft, non-tender, non-distended. no cva tenderness. normal BS  RECTAL: brown stool  MS: Normal ROM in all extremities. No midline spinal tenderness. pulses 2 +. no calf tenderness or swelling  SKIN: Warm, dry, no acute rashes. Good turgor  NEURO: A&Ox3, No focal deficits.

## 2022-03-03 NOTE — ED PROVIDER NOTE - OBJECTIVE STATEMENT
77 year old female with pmhx of MDS, DM, sent in for low hbg. Pt had outpt hb of 5.9. pt admits to fatigue and weakness. no rectal bleeding, dark stools, chest pain, sob, loc, dizziness , fever or chills. Hem onc - Dr Wade

## 2022-03-03 NOTE — ED CDU PROVIDER INITIAL DAY NOTE - OBJECTIVE STATEMENT
76 yo female hx of DM/ chronic anemia 2/2 MDS ( last transfusion ~ 5 weeks ago) placed in obs for blood transfusion. patient was found to have low hemoglobin on outpatient lab so she was sent to ED for blood transfusion. Denies black and bloody stool. report frequent loose bowel 2/2 MDS medications. patient otherwise denies any complaints.

## 2022-03-04 VITALS
HEART RATE: 82 BPM | TEMPERATURE: 99 F | SYSTOLIC BLOOD PRESSURE: 185 MMHG | DIASTOLIC BLOOD PRESSURE: 79 MMHG | OXYGEN SATURATION: 97 % | RESPIRATION RATE: 18 BRPM

## 2022-03-04 PROCEDURE — 99217: CPT

## 2022-03-04 NOTE — PHYSICAL EXAM
[Restricted in physically strenuous activity but ambulatory and able to carry out work of a light or sedentary nature] : Status 1- Restricted in physically strenuous activity but ambulatory and able to carry out work of a light or sedentary nature, e.g., light house work, office work [Normal] : affect appropriate [de-identified] : pallor +

## 2022-03-04 NOTE — ED CDU PROVIDER DISPOSITION NOTE - CLINICAL COURSE
Patient received 2 units PRBC without incident.  VSS.  D/C home.  Strict return instructions discussed. Patient received 3 units PRBC without incident.  VSS.  D/C home.  Strict return instructions discussed.

## 2022-03-04 NOTE — ED CDU PROVIDER DISPOSITION NOTE - PATIENT PORTAL LINK FT
You can access the FollowMyHealth Patient Portal offered by [facility] by registering at the following website: [email or web address]. By joining [facility] [facility] [employer]’s FollowMyHealth portal, you will also be able to view your health information using other applications (apps) compatible with our system.

## 2022-03-04 NOTE — ED CDU PROVIDER DISPOSITION NOTE - CARE PROVIDER_API CALL
Kaveh Wade)  Hematology; Internal Medicine; Medical Oncology  [address] [place] [address]  Phone: [phone]  Fax: [phone]  Follow Up Time:

## 2022-03-04 NOTE — ASSESSMENT
[FreeTextEntry1] : Patient is a 77 year old female with # Lowrisk myelodysplastic syndrome, previously treated with Revlimid and Procrit and Vidaza . 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 \par Patient is s/p  9 cycles of Vidaza and she was requiring pRBC transfusion every 3 weeks \par Given the persistent transfusion dependence she was switched to Luspatercept.\par \par Hyperkalemia likely due to increased platelet; pseudohyperkalemia.\par \par PLAN:\par -- Continue Luspatercept 1.75 mg/kg every 3 weeks, \par --Continue Procrit 80,000 units every week.\par Side effects of Procrit discussed including but not limited to: hypertension, headache, pruritus, nausea, vomiting, injection site pain, arthralgia, cough, fever.\par -- Monitor CBC weekly and administer transfusions as needed to keep Hgb >7 gm/dL. Todays Hgb is 5.9, arranged for pt to receive 1 unit PRBC in the ER\par Send stool for occult blood\par \par \par # Vitamin  B 12 deficiency\par -- Continue Vitamin B 12 injection every month\par \par # Thrombocytosis\par -- S/P Hydrea\par -- Will continue to monitor.\par \par Discussed with pt that her transfusion requirement is increasing, we will rpt BM biopsy at next visit.\par Labs reviewed, adequate iron stores\par \par RTC in 3 weeks with next Luspatercept injection\par \par \par \par

## 2022-03-10 ENCOUNTER — APPOINTMENT (OUTPATIENT)
Dept: INFUSION THERAPY | Facility: CLINIC | Age: 77
End: 2022-03-10

## 2022-03-10 ENCOUNTER — LABORATORY RESULT (OUTPATIENT)
Age: 77
End: 2022-03-10

## 2022-03-10 RX ORDER — PREGABALIN 225 MG/1
1000 CAPSULE ORAL ONCE
Refills: 0 | Status: COMPLETED | OUTPATIENT
Start: 2022-03-10 | End: 2022-03-10

## 2022-03-10 RX ADMIN — PREGABALIN 1000 MICROGRAM(S): 225 CAPSULE ORAL at 13:27

## 2022-03-15 LAB
HCT VFR BLD CALC: 34.8 %
HCT VFR BLD CALC: 36.9 %
HGB BLD-MCNC: 11.8 G/DL
HGB BLD-MCNC: 12.5 G/DL
MCHC RBC-ENTMCNC: 30 PG
MCHC RBC-ENTMCNC: 30.5 PG
MCHC RBC-ENTMCNC: 33.9 G/DL
MCHC RBC-ENTMCNC: 33.9 G/DL
MCV RBC AUTO: 88.7 FL
MCV RBC AUTO: 89.9 FL
PLATELET # BLD AUTO: 184 K/UL
PLATELET # BLD AUTO: 340 K/UL
PMV BLD: 11.7 FL
PMV BLD: 9.5 FL
RBC # BLD: 3.87 M/UL
RBC # BLD: 4.16 M/UL
RBC # FLD: 17.5 %
RBC # FLD: 17.6 %
WBC # FLD AUTO: 5.55 K/UL
WBC # FLD AUTO: 5.9 K/UL

## 2022-03-16 RX ORDER — ERYTHROPOIETIN 40000 [IU]/ML
40000 INJECTION, SOLUTION INTRAVENOUS; SUBCUTANEOUS WEEKLY
Qty: 2 | Refills: 0 | Status: DISCONTINUED | COMMUNITY
Start: 2021-12-09 | End: 2022-03-16

## 2022-03-16 RX ORDER — MUPIROCIN CALCIUM 20 MG/G
2 OINTMENT TOPICAL
Qty: 10 | Refills: 0 | Status: DISCONTINUED | COMMUNITY
Start: 2019-03-04 | End: 2022-03-16

## 2022-03-16 RX ORDER — QUINAPRIL HYDROCHLORIDE 20 MG/1
20 TABLET, FILM COATED ORAL
Qty: 60 | Refills: 0 | Status: DISCONTINUED | COMMUNITY
Start: 2018-01-20 | End: 2022-03-16

## 2022-03-16 RX ORDER — ALLOPURINOL 100 MG/1
100 TABLET ORAL DAILY
Qty: 60 | Refills: 0 | Status: DISCONTINUED | COMMUNITY
Start: 2020-09-30 | End: 2022-03-16

## 2022-03-16 RX ORDER — HYDROXYUREA 500 MG/1
500 CAPSULE ORAL TWICE DAILY
Qty: 60 | Refills: 0 | Status: DISCONTINUED | COMMUNITY
Start: 2021-08-10 | End: 2022-03-16

## 2022-03-16 RX ORDER — SODIUM POLYSTYRENE SULFONATE 15 G/60ML
15 SUSPENSION ORAL; RECTAL
Qty: 240 | Refills: 0 | Status: DISCONTINUED | COMMUNITY
Start: 2020-05-26 | End: 2022-03-16

## 2022-03-16 RX ORDER — ERYTHROPOIETIN 40000 [IU]/ML
40000 INJECTION, SOLUTION INTRAVENOUS; SUBCUTANEOUS
Qty: 4 | Refills: 0 | Status: DISCONTINUED | COMMUNITY
Start: 2019-08-14 | End: 2022-03-16

## 2022-03-16 RX ORDER — ALLOPURINOL 300 MG/1
300 TABLET ORAL DAILY
Qty: 30 | Refills: 1 | Status: DISCONTINUED | COMMUNITY
Start: 2020-08-18 | End: 2022-03-16

## 2022-03-16 RX ORDER — COLLAGENASE SANTYL 250 [ARB'U]/G
250 OINTMENT TOPICAL DAILY
Qty: 1 | Refills: 3 | Status: DISCONTINUED | COMMUNITY
Start: 2018-12-05 | End: 2022-03-16

## 2022-03-16 RX ORDER — DIPHENOXYLATE HYDROCHLORIDE AND ATROPINE SULFATE 2.5; .025 MG/1; MG/1
2.5-0.025 TABLET ORAL
Qty: 60 | Refills: 0 | Status: DISCONTINUED | OUTPATIENT
Start: 2017-08-21 | End: 2022-03-16

## 2022-03-16 RX ORDER — ASPIRIN 81 MG/1
81 TABLET, CHEWABLE ORAL
Refills: 0 | Status: DISCONTINUED | COMMUNITY
Start: 2017-02-21 | End: 2022-03-16

## 2022-03-17 ENCOUNTER — LABORATORY RESULT (OUTPATIENT)
Age: 77
End: 2022-03-17

## 2022-03-17 ENCOUNTER — APPOINTMENT (OUTPATIENT)
Dept: INFUSION THERAPY | Facility: CLINIC | Age: 77
End: 2022-03-17

## 2022-03-17 RX ORDER — ERYTHROPOIETIN 10000 [IU]/ML
80000 INJECTION, SOLUTION INTRAVENOUS; SUBCUTANEOUS ONCE
Refills: 0 | Status: COMPLETED | OUTPATIENT
Start: 2022-03-17 | End: 2022-03-17

## 2022-03-17 RX ADMIN — ERYTHROPOIETIN 80000 UNIT(S): 10000 INJECTION, SOLUTION INTRAVENOUS; SUBCUTANEOUS at 14:44

## 2022-03-18 LAB
HCT VFR BLD CALC: 32.9 %
HGB BLD-MCNC: 10.7 G/DL
MCHC RBC-ENTMCNC: 29.6 PG
MCHC RBC-ENTMCNC: 32.5 G/DL
MCV RBC AUTO: 90.9 FL
PLATELET # BLD AUTO: 448 K/UL
PMV BLD: 10.1 FL
RBC # BLD: 3.62 M/UL
RBC # FLD: 17.3 %
WBC # FLD AUTO: 6.73 K/UL

## 2022-03-22 ENCOUNTER — APPOINTMENT (OUTPATIENT)
Dept: GASTROENTEROLOGY | Facility: CLINIC | Age: 77
End: 2022-03-22
Payer: MEDICARE

## 2022-03-22 DIAGNOSIS — Z80.3 FAMILY HISTORY OF MALIGNANT NEOPLASM OF BREAST: ICD-10-CM

## 2022-03-22 PROCEDURE — 99443: CPT

## 2022-03-22 RX ORDER — ASPIRIN 81 MG
81 TABLET, DELAYED RELEASE (ENTERIC COATED) ORAL
Refills: 0 | Status: ACTIVE | COMMUNITY

## 2022-03-22 NOTE — HISTORY OF PRESENT ILLNESS
[Home] : at home, [unfilled] , at the time of the visit. [Medical Office: ([facility])___] : at the medical office located in  [Verbal consent obtained from patient] : the patient, [unfilled] [FreeTextEntry4] : Rebekah Palomo  [de-identified] : 77-year-old female with myelodysplastic syndrome presents for evaluation of diarrhea.\par The patient had a colonoscopy in 2017 which was read as normal, however pathology revealed a small adenoma on one biopsy.  The patient states that she has had diarrhea for months: 1-10 times per day.  She has lost 10 pounds over the last year.  She denies nausea vomiting or black or bloody stools or fevers or chills or dysphagia or odynophagia or postprandial abdominal pain.  No stool testing has been done at this time.

## 2022-03-22 NOTE — ASSESSMENT
[FreeTextEntry1] : 77-year-old female with myelodysplastic syndrome referred for diarrhea.\par We will check stools for ova and parasite and cultures and C. difficile and GI PCR.  We will also perform colonoscopy, check INR, check CBC, check TTG and IgA levels.  The risks and benefits of colonoscopy were reviewed with the patient

## 2022-03-30 ENCOUNTER — RESULT REVIEW (OUTPATIENT)
Age: 77
End: 2022-03-30

## 2022-03-30 ENCOUNTER — LABORATORY RESULT (OUTPATIENT)
Age: 77
End: 2022-03-30

## 2022-03-30 ENCOUNTER — APPOINTMENT (OUTPATIENT)
Dept: INFUSION THERAPY | Facility: CLINIC | Age: 77
End: 2022-03-30

## 2022-03-30 ENCOUNTER — APPOINTMENT (OUTPATIENT)
Dept: HEMATOLOGY ONCOLOGY | Facility: CLINIC | Age: 77
End: 2022-03-30
Payer: MEDICARE

## 2022-03-30 VITALS
BODY MASS INDEX: 25.4 KG/M2 | SYSTOLIC BLOOD PRESSURE: 153 MMHG | DIASTOLIC BLOOD PRESSURE: 65 MMHG | WEIGHT: 138 LBS | HEART RATE: 99 BPM | HEIGHT: 62 IN | TEMPERATURE: 98.2 F

## 2022-03-30 LAB
HCT VFR BLD CALC: 25.7 %
HGB BLD-MCNC: 8.6 G/DL
MCHC RBC-ENTMCNC: 29.7 PG
MCHC RBC-ENTMCNC: 33.5 G/DL
MCV RBC AUTO: 88.6 FL
PLATELET # BLD AUTO: 493 K/UL
PMV BLD: 10.1 FL
RBC # BLD: 2.9 M/UL
RBC # FLD: 17.1 %
WBC # FLD AUTO: 8.78 K/UL

## 2022-03-30 PROCEDURE — 38220 DX BONE MARROW ASPIRATIONS: CPT | Mod: RT,25

## 2022-03-30 PROCEDURE — 88305 TISSUE EXAM BY PATHOLOGIST: CPT | Mod: 26

## 2022-03-30 PROCEDURE — 88189 FLOWCYTOMETRY/READ 16 & >: CPT

## 2022-03-30 PROCEDURE — 88341 IMHCHEM/IMCYTCHM EA ADD ANTB: CPT | Mod: 26,59

## 2022-03-30 PROCEDURE — 38221 DX BONE MARROW BIOPSIES: CPT

## 2022-03-30 PROCEDURE — 99214 OFFICE O/P EST MOD 30 MIN: CPT

## 2022-03-30 PROCEDURE — 88342 IMHCHEM/IMCYTCHM 1ST ANTB: CPT | Mod: 26,59

## 2022-03-30 PROCEDURE — 88311 DECALCIFY TISSUE: CPT | Mod: 26

## 2022-03-30 PROCEDURE — 88313 SPECIAL STAINS GROUP 2: CPT | Mod: 26

## 2022-03-30 RX ORDER — LUSPATERCEPT 75 MG/1
110 INJECTION, POWDER, LYOPHILIZED, FOR SOLUTION SUBCUTANEOUS ONCE
Refills: 0 | Status: COMPLETED | OUTPATIENT
Start: 2022-03-30 | End: 2022-03-30

## 2022-03-30 RX ORDER — ERYTHROPOIETIN 10000 [IU]/ML
80000 INJECTION, SOLUTION INTRAVENOUS; SUBCUTANEOUS ONCE
Refills: 0 | Status: COMPLETED | OUTPATIENT
Start: 2022-03-30 | End: 2022-03-30

## 2022-03-30 RX ADMIN — LUSPATERCEPT 110 MILLIGRAM(S): 75 INJECTION, POWDER, LYOPHILIZED, FOR SOLUTION SUBCUTANEOUS at 12:09

## 2022-03-30 RX ADMIN — ERYTHROPOIETIN 80000 UNIT(S): 10000 INJECTION, SOLUTION INTRAVENOUS; SUBCUTANEOUS at 12:08

## 2022-03-31 ENCOUNTER — APPOINTMENT (OUTPATIENT)
Dept: INFUSION THERAPY | Facility: CLINIC | Age: 77
End: 2022-03-31

## 2022-04-03 NOTE — REASON FOR VISIT
[Follow-Up Visit] : a follow-up [Bone Marrow Biopsy] : bone marrow biopsy [Bone Marrow Aspiration] : bone marrow aspiration [FreeTextEntry2] : MDS

## 2022-04-03 NOTE — ASSESSMENT
[FreeTextEntry1] : Patient is a 77 year old female with # Lowrisk myelodysplastic syndrome, previously treated with Revlimid and Procrit and Vidaza . 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 \par Patient is s/p  9 cycles of Vidaza and she was requiring pRBC transfusion every 3 weeks \par Given the persistent transfusion dependence she was switched to Luspatercept.\par \par Hyperkalemia likely due to increased platelet; pseudohyperkalemia.\par \par PLAN:\par -- Continue Luspatercept 1.75 mg/kg every 3 weeks, \par --Continue Procrit 80,000 units every week.\par Side effects of Procrit discussed including but not limited to: hypertension, headache, pruritus, nausea, vomiting, injection site pain, arthralgia, cough, fever.\par -- Monitor CBC weekly and administer transfusions as needed to keep Hgb >7 gm/dL. \par Send stool for occult blood\par \par \par # Vitamin  B 12 deficiency\par -- Continue Vitamin B 12 injection every month\par \par # Thrombocytosis\par -- S/P Hydrea\par -- Will continue to monitor.\par \par Discussed with pt that her transfusion requirement is increasing, we will rpt BM biopsy today\par Labs reviewed, \par \par RTC in 3 weeks \par \par \par \par

## 2022-04-03 NOTE — PROCEDURE
[Bone Marrow Biopsy] : bone marrow biopsy [Bone Marrow Aspiration] : bone marrow aspiration  [Patient] : the patient [Verbal Consent Obtained] : verbal consent was obtained prior to the procedure [Patient identification verified] : patient identification verified [Procedure verified and consent obtained] : procedure verified and consent obtained [Laterality verified and correct site marked] : laterality verified and correct site marked [Correct positioning] : correct positioning [Left lateral decibitus position] : left lateral decibitus position [The right posterior iliac crest was prepped with betadine and draped, using sterile technique.] : The right posterior iliac crest was prepped with betadine and draped, using sterile technique. [Superior iliac spine was identified] : the superior iliac spine was identified. [Lidocaine was injected and into the periosteum overlying the site.] : Lidocaine was injected and into the periosteum overlying the site. [Aspirate] : aspirate [Cytogenetics] : cytogenetics [FISH] : FISH [Biopsy] : biopsy [Flow Cytometry] : flow cytometry [FreeTextEntry1] : MDS [] : The patient was instructed to remove the bandage the following AM. The patient may bathe. Acetaminophen may be taken for discomfort, as per package directions.If there are any other problems, the patient was instructed to call the office. The patient verbalized understanding, and is aware of the office contact numbers.

## 2022-04-03 NOTE — CONSULT LETTER
[Dear  ___] : Dear ~GONZALES, [Courtesy Letter:] : I had the pleasure of seeing your patient, [unfilled], in my office today. [Please see my note below.] : Please see my note below. [Consult Closing:] : Thank you very much for allowing me to participate in the care of this patient.  If you have any questions, please do not hesitate to contact me. [Sincerely,] : Sincerely, [DrBakari  ___] : Dr. VOGT [FreeTextEntry3] : Nicolás Andres NP [FreeTextEntry2] : Dr. David Torres

## 2022-04-03 NOTE — PROCEDURE
[Bone Marrow Biopsy] : bone marrow biopsy [Patient] : the patient [Bone Marrow Aspiration] : bone marrow aspiration  [Verbal Consent Obtained] : verbal consent was obtained prior to the procedure [Patient identification verified] : patient identification verified [Procedure verified and consent obtained] : procedure verified and consent obtained [Laterality verified and correct site marked] : laterality verified and correct site marked [Correct positioning] : correct positioning [Left lateral decibitus position] : left lateral decibitus position [Superior iliac spine was identified] : the superior iliac spine was identified. [The right posterior iliac crest was prepped with betadine and draped, using sterile technique.] : The right posterior iliac crest was prepped with betadine and draped, using sterile technique. [Lidocaine was injected and into the periosteum overlying the site.] : Lidocaine was injected and into the periosteum overlying the site. [Aspirate] : aspirate [Cytogenetics] : cytogenetics [FISH] : FISH [Biopsy] : biopsy [Flow Cytometry] : flow cytometry [] : The patient was instructed to remove the bandage the following AM. The patient may bathe. Acetaminophen may be taken for discomfort, as per package directions.If there are any other problems, the patient was instructed to call the office. The patient verbalized understanding, and is aware of the office contact numbers. [FreeTextEntry1] : MDS

## 2022-04-03 NOTE — REVIEW OF SYSTEMS
[Recent Change In Weight] : ~T recent weight change [Fatigue] : fatigue [Diarrhea] : diarrhea [Negative] : Allergic/Immunologic [Shortness Of Breath] : no shortness of breath [FreeTextEntry2] : weight loss 10 lbs in the last 4 months [FreeTextEntry7] : watery diarrhea

## 2022-04-03 NOTE — HISTORY OF PRESENT ILLNESS
[de-identified] : She missed on appointment for procrit last week.\par She starts her next cycle on 6/25/18\par C/o back pain radiating down her left which occurred when she bent to  something.\par No change in diarrhea.\par \par 7/31/18:\par Doing well. On Revlimid for more than 2yrs, 5 mg 2 weeks on 1 week off. She will be starting week on tonight. \par C/o diarrhea. On Imodium 2 pills AM and 2 pills PM. Doesn't help much with diarrhea. \par C/o nausea, abdominal pain. \par \par \par Colonoscopy in 2016 was normal. \par Did not have blood transfusion for over 2 years. \par On procrit [id number] weekly. Missed last week on 7/24/18 as she was out of town. She has been non compliant with weekly Procrit.\par Mammogram in 2017 was normal. \par \par 9/11/18\par pt is here for follow up.\par She feels the lomotil helps with the diarrhea.\par She was away for a few weeks and missed her procrit injections.\par No fever, abdominal  discomfort has been less since since use of lomotil.\par She started a new cycle 2 days ago.\par \par 10/2/18:\par She will start Revlimid tonight (week on). 2 weeks on, 1 week off. \par On ASA 81mg. \par Denies fever, nausea, vomiting, chest pain, SOB, abdominal pain, and bladder problems.\par C/o diarrhea. \par S/p 2 units PRBC transfusion on 9/25/18. \par On Procrit 56920 units weekly. Not compliant every week. \par C/o intermittent dizziness. \par \par 10/31/18\par Pt is here for follow up.\par C/O significant fatigue.\par Continues to have diarrhea, takes imodium and lomotil as needed.\par C/o dry cough, no fever.\par Cough started a month ago. It is worse in the mornings however over last week it has been constant all day.\par No chest pain.\par She was found to have low B12 levels and was started on B12 injections.\par \par 11/27/18:\par Doing well. Hospitalized for 3 days for cellulitis/abcess of the back s/p drainage and on Abx currently. Developed 3 days after Mg infusion as per pt. \par Denies nausea, vomiting, chest pain, SOB, abdominal pain, bowel and bladder problems.\par This is the week on Revlimid (week 1).\par S/p 1 unit PRBC transfusion in the hospital. \par On Procrit weekly \par \par 12/27/18:\par Doing well. No major complaints.\par Denies fever, nausea, vomiting, chest pain, SOB, abdominal pain, and bladder problems.\par On Revlimid 5 mg 2 weeks on 1 week off. This is the week off. She will start the week on sunday (12/30/18).\par Due for Procrit 54242 units today. \par Still has diarrhea. 4-5bm/day.\par On monthly B12 injections. \par \par 1/30/19:\par Patient here for follow up for MDS.  She is taking Revlimid 5 mg, 2 weeks on, 1 week off.  She will complete this current cycle on Saturday.  She has no new complaints today.  Patient denies cough, shortness of breath, denies fever, denies bone pain.\par \par 03/04/2019\par Patient is here for a follow up visit. She complaints of severe pain in her left shoulder and has had abscess drained 2 weeks ago. However the pain is worse and she has purulent discharge on examination. She also has Nasal sores that are painful. Culture from 11/2018 showed MRSA.  Denies Fever. \par She also complaints of swelling in her right leg. Not tender and not erythematous and negative Gong;s sign. \par Her diarrhea with Revlimid is ongoing and Imodium and Lomotil helps but not everyday. \par She is on 60,000 units of procrit weekly and Revlimid 5 mg 2 weeks on 1 week off. \par \par 4/23/19\par Pt is here for follow up.\par She feels well.\par The nasal sores have improved with bactroban\par The abscess on left shoulder has resolved.\par However she has a new one on the middle of her back.\par No fever.\par Pt has been on procrit 11320 units weekly, however she missed a dose in between.\par \par 5/8/19\par pt is here for follow up.\par no new complaints.\par feels well.\par Her skin abscess has resolved.\par she has been unable to go to ID, as she could not get an appt.\par No bleeding.\par She is compliant with revlimid.\par \par 6/6/19\par Pt is here for follow up.\par no new complaints \par Feels well.\par Is on week 2 of her Revlimid cycle. \par No transfusions since last visit\par \par 7/17/19\par Pt is here for follow up.\par C/O fatigue.\par Was away for a few days two weeks ago, but missed treatment with procrit for 2 weeks.\par Is complaint with Revlimid 2 weeks on 1 week off.\par Diarrhea is a little improved.\par \par 8/14/19\par Patient here for follow up visit, feeling well.  Although she is complaining of some pain at the left scapula area recently.  Patient denies cough, shortness of breath, denies fever, denies other bone pain.  She is off Revlimid  this week, due to restart on Monday.  She is scheduled for Procrit and Vitamin B12 injection today.  She plans to leave the country tomorrow to visit her mother who is ill.  She will return in about 10 days.\par \par 9/11/19\par Pt is here for follow up.\par She was away for 3 weeks, out of which she took procrit for 2 weeks in [place].\par She missed last week's dose.\par She had CBCs in [place] which showed Hgb of 8.9\par She feels well.\par She still getting diarrhea.\par She has been using lomotil with very minimal relief.\par She started a new cycle of Revlimid 4 days ago.\par \par 10/3/19\par Patient here for follow up visit, feeling fairly well.   Patient denies cough, shortness of breath, denies fever, denies other bone pain.  She remains on Revlimid.  She is scheduled for Procrit and Vitamin B12 injection today.\par \par 10/24/19\par Pt is here for follow up.\par Feels well.\par No SOB, fatigue.\par Compliant with procrit and Revl;imid.\par Remains on ASa.\par Diarrhea is unchanged.\par Pt takes imodium as needed.\par \par 11/14/19\par Pt is here for follow up.\par No new complaints.\par Starts a new cycle of Revlimid today.\par Diarrhea is same as before, no rectal bleeding.\par No fever.\par \par 12/5/19\par Pt is here for follow up.\par No new complaints.\par Denies feeling weaker than usual.\par No fever, SOB.\par No change in frequency of diarrhea.\par No pain.\par Will start next cycle of Revlimid in 3 days.\par \par \par !/16/20\par Pt was recently DCed from [facility] 3 days ago after being treated for pneumonia and MARCO.\par She is on po Levaquin.\par She restarted Revlmid 3 days ago.\par She is feeling better now. No fever.\par No SOB, Chest pain and back pain has resolved.\par Pt has a cough, no expectoration.\par She also recd a unit of PRBCs last week in the hospital\par \par \par 1/30/20\par Patient here for follow up visit, feeling well.  She has no new complaints. Cough is almost gone completely.  Patient denies shortness of breath, denies fever, denies bone pain.  Her appetite is ok, weight is stable.  She is due to restart Revlimid on Monday.\par \par 02/20/20\par Pt is here for follow up, feeling well.\par Offers no new complaints. Denies SOB, fever, chills, weight loss, CP, cough. \par Currently off week of Revlimid 5 mg. Restarts on Monday.\par Has procrit 80,000 units today. Next b 12 injection due in 2 weeks \par Did not get chest Xray\par CBC reviewed WBC 3.1, h/h 8.3/25%, plt 386, neutrophils 1.29\par \par 3/12/20\par Pt is here for follow up.\par She took Revlimid for 2 weeks and then has been off for one week although she was advised to take it daily without break.\par No SOB, Cough, fever.\par Has mild fatigue.\par \par 04/02/2020\par SIMONA KUHN a 75 year F is here today for follow up visit of MDS.\par Denies fever, chills, cough,night sweats, weight loss. \par Denies bleeding or bruising.\par Pt receives procrit 80,000 units weekly and B12 IM monthly. \par Continued Revlimid 5 mg daily x 3 weeks, has 1 dose left tonight. \par CBC reviewed: WBC 3.2, H/H 8.1/25.2, neutrophils 1.6, PLT 72\par \par 4/20/2020: The patient is here for follow up . She has no complaints of fever, chills, dizziness , chest pain and shortness of breath . She is still with diarrhea , up to 10 watery BMs per day, responds poorly to imodium and lomotil. She is on Revlimid 5 mg daily , took last dose yesterday night. Her last Procrit was on April 13. \par \par 5/26/20\par Pt is here for follow up.\par She is feeling well. Denies SOB, chest pain, fever.\par Continues to have diarrhea although she is off of Revlmid.\par Thinks it may due to diabetic meds.\par Wants to discuss BM bx results\par \par 6/22/20\par Pt is here for follow up.\par Feels well. Denies any fever, N, V, D\par Continues to have diarrhea, she will talk to her PCP about changing metformin for DM.\par Has been needing PRBCs 1 unit each month\par \par 7/20/20\par Pt is here for follow up.\par No new complaints. Has been feeling well.\par No SOB, CP. \par No N, V, D.\par She has recd 2 units of PRBCs since May 20.\par \par 8/17/20\par Pt is here for a follow up visit.\par She is feeling well.\par No new symptoms. No N, V, D.\par No bruising or bleeding. She continues to need PRBCs every 2-3 weeks.\par \par 8/31/20\par Pt is here for follow up. She is feeling well. No N, V, D.\par She is tolerating the hydrea well. No SOB, CP\par No bruising ior bleeding\par \par 9/8/20\par Pt is here for follow up.\par She had been contacted by the [facility] last week to advise her to stop the hydrea. Pt did not check her messages and continued with Hydrea.\par No fever, N, V, bleeding.\par Saw Dr Ferrell last week.\par \par 9/14/20\par Pt is here for folow up.\par Besides her usual complaint of diarrhea she is feeling well.\par Recd 1 unit PRBC last week.\par Has stopped Hydrea.\par No fever, mouth sores.\par \par 9/29/20\par Pt is here for folow up.\par No new complaints.\par Is seeing GI for diarrhea net week.\par No fever, night sweats.\par REcd 3 units of PRBC this month\par \par 10/13/20\par Pt is here for follow up.\par No new complaints.\par Has not needed a transfusion since 3 weeks ago.\par Continues to have diarrhea, waiting to be seen by GI\par \par 10/26/20\par Pt is here for follow up.\par C/O feeling fatigued.\par Has CLARK.\par No nausea or vomiting.\par No fever.\par Diarrhea has improved a little. She is no longer on Metformin\par \par 11/9/20\par Pt is here for follow up.\par Feels well. Denies SOB, CP, fatigue\par \par 12/7/20- Patient is for follow up and is due for cycle 9 of Vidaza.  She still has loose BM sometimes 10 times a day and was seeing Dr. Corey from GI- did not follow up recently and is unable to get an appt with him. She has lost about 10 lbs since September. No N/V. No fever or chills. No CP/SOB. She has been getting PRBC transfusion every 3 weeks now\par \par 01/04/20 Pt presented today for f/u visit . She is s/p 8 cycles of vidaza and was started on Luspatercept in Dec , 2020 . So far she received 1 injection , she is due for 2nd injection today . Adverse effect profile was discussed with her in detail , she reports having some dizziness and weakness after first injection . She received blood transfusion last wk . Blood work from today reviewed as well and counts are adequate . She denies any fevers,  chills,  or any new symptoms . \par \par 1/25/21\par Pt is here for follow up.\par C/O diarrhea, otherwise feeling better.\par Has not needed a transfusion since 12/29\par \par 2/16/21\par Pt is here for follow up.\par Needed transfusion on 2/8/21.\par Continues to C/o fatigue. Diarrhea remains the same, has not made GI appt as yet.\par \par 3/8/21\par Pt is here for follow up.\par Feels better today. Recd 1 unit PRBCs last week.\par Diarrhea is same as before. has an appt with GI next week.\par No fever\par \par 3/30/21\par Pt is here for follow up.\par Feels better. Last transfusion was on 3/2/21\par Saw GI and was started on cholestyramine and metformin was DCED with resolution of diarrhea.\par She denies any SOB,\par Fatigue is better\par \par 4/20/2021\par Pt is here to f/u for MDS\par She is s/p Luspatercept cycle #6\par She is compliant with Aspirin\par She feels better today, appetite is good\par She denies shortness of breath, fatigue, or weakness \par She c/o of diarrhea but it has improved since she was started on Cholestyramine. Stool is soft and less in frequency\par She received COVID vaccine x 2 doses\par \par 5/11/21\par pt is here for follow up.\par Feels the same with fatigue, diarrhea.\par No SOB\par \par 6/21/21\par Pt is here for follow up.\par Feels well. No SOB, CP, N< V.\par Diarrhea is better controlled now.\par Last PRBC transfusion was 2 weeks ago.\par \par 7/19/21\par Pt is here for follow up.\par Feels a little tired, last transfusion was 6/1/21.\par No bleeding, fever, SOB\par \par 8/10/2021\par Patient is here to follow up for her MDS.\par She is on Luspatercept, tolerating well.\par She feels well today, the appetite is good.\par She denies shortness of breath, fatigue, fever, night sweats, abdominal pain, arthralgias/myalgias.\par \par 8/31/21\par Pt is here for follow up.\par C/O feeling very fatigued.\par No bleeding.\par No fever.\par \par 9/21/2021\par Patient is here to follow up for MDS.\par She is on Luspatercept and Retacrit, tolerating well.\par She gets blood transfusion as needed for Hgb <7 gm/dL\par She is c/o of fatigue, no shortness of breath, dizziness, chills or lightheadedness.\par She denies melena or hematochezia.\par Her appetite is good, no nausea/vomiting.\par \par 10/28/2021\par Patient is here to follow up for MDS.\par She is on Luspatercept and Retacrit, tolerating well.\par She gets blood transfusion to keep Hgb > 7 gm/dL.\par She is c/o of chronic fatigue, no shortness of breath, dizziness, chills or lightheadedness.\par She denies melena or hematochezia.\par Her appetite is good, no nausea/vomiting.\par She c/o of severe diarrhea, 10-12x/day watery stool while on Imodium in the last 2 weeks.\par Last colonoscopy was in 2016, benign as per patient.\par \par 11/18/21\par Pt is here for follow up. C/O fatigue. No SOB, CP\par \par 12/9/21\par Pt is here for follow up.\par Feels better today. Recd 2 units last week in ER.\par Planning to go to [place] for over a week and does not want to miss her procrit dose.\par Diarrhea is stable,\par \par 12/23/21\par Pt is here for follow up.\par Feels better. No SOB, CP. Going to [place] tomorrow. Has not been able to get Procrit injection from the pharmacy yet d/t insurance issues\par \par 2/3/22\par Pt is here for follow up. Feeling same as usual. No SOB, CP. Last transfusion was 2 weeks ago in ER>\par No bleeding reported\par \par 3/3/22\par Pt is here for follow up.\par C/O fatigue. Had black stools X2\par \par 3/30/22\par Pt is feeling well.\par Here for treatment and BM biopsy [de-identified] : \par

## 2022-04-03 NOTE — HISTORY OF PRESENT ILLNESS
[de-identified] : She missed on appointment for procrit last week.\par She starts her next cycle on 6/25/18\par C/o back pain radiating down her left which occurred when she bent to  something.\par No change in diarrhea.\par \par 7/31/18:\par Doing well. On Revlimid for more than 2yrs, 5 mg 2 weeks on 1 week off. She will be starting week on tonight. \par C/o diarrhea. On Imodium 2 pills AM and 2 pills PM. Doesn't help much with diarrhea. \par C/o nausea, abdominal pain. \par \par \par Colonoscopy in 2016 was normal. \par Did not have blood transfusion for over 2 years. \par On procrit [id number] weekly. Missed last week on 7/24/18 as she was out of town. She has been non compliant with weekly Procrit.\par Mammogram in 2017 was normal. \par \par 9/11/18\par pt is here for follow up.\par She feels the lomotil helps with the diarrhea.\par She was away for a few weeks and missed her procrit injections.\par No fever, abdominal  discomfort has been less since since use of lomotil.\par She started a new cycle 2 days ago.\par \par 10/2/18:\par She will start Revlimid tonight (week on). 2 weeks on, 1 week off. \par On ASA 81mg. \par Denies fever, nausea, vomiting, chest pain, SOB, abdominal pain, and bladder problems.\par C/o diarrhea. \par S/p 2 units PRBC transfusion on 9/25/18. \par On Procrit 56693 units weekly. Not compliant every week. \par C/o intermittent dizziness. \par \par 10/31/18\par Pt is here for follow up.\par C/O significant fatigue.\par Continues to have diarrhea, takes imodium and lomotil as needed.\par C/o dry cough, no fever.\par Cough started a month ago. It is worse in the mornings however over last week it has been constant all day.\par No chest pain.\par She was found to have low B12 levels and was started on B12 injections.\par \par 11/27/18:\par Doing well. Hospitalized for 3 days for cellulitis/abcess of the back s/p drainage and on Abx currently. Developed 3 days after Mg infusion as per pt. \par Denies nausea, vomiting, chest pain, SOB, abdominal pain, bowel and bladder problems.\par This is the week on Revlimid (week 1).\par S/p 1 unit PRBC transfusion in the hospital. \par On Procrit weekly \par \par 12/27/18:\par Doing well. No major complaints.\par Denies fever, nausea, vomiting, chest pain, SOB, abdominal pain, and bladder problems.\par On Revlimid 5 mg 2 weeks on 1 week off. This is the week off. She will start the week on sunday (12/30/18).\par Due for Procrit 09004 units today. \par Still has diarrhea. 4-5bm/day.\par On monthly B12 injections. \par \par 1/30/19:\par Patient here for follow up for MDS.  She is taking Revlimid 5 mg, 2 weeks on, 1 week off.  She will complete this current cycle on Saturday.  She has no new complaints today.  Patient denies cough, shortness of breath, denies fever, denies bone pain.\par \par 03/04/2019\par Patient is here for a follow up visit. She complaints of severe pain in her left shoulder and has had abscess drained 2 weeks ago. However the pain is worse and she has purulent discharge on examination. She also has Nasal sores that are painful. Culture from 11/2018 showed MRSA.  Denies Fever. \par She also complaints of swelling in her right leg. Not tender and not erythematous and negative Gong;s sign. \par Her diarrhea with Revlimid is ongoing and Imodium and Lomotil helps but not everyday. \par She is on 60,000 units of procrit weekly and Revlimid 5 mg 2 weeks on 1 week off. \par \par 4/23/19\par Pt is here for follow up.\par She feels well.\par The nasal sores have improved with bactroban\par The abscess on left shoulder has resolved.\par However she has a new one on the middle of her back.\par No fever.\par Pt has been on procrit 21675 units weekly, however she missed a dose in between.\par \par 5/8/19\par pt is here for follow up.\par no new complaints.\par feels well.\par Her skin abscess has resolved.\par she has been unable to go to ID, as she could not get an appt.\par No bleeding.\par She is compliant with revlimid.\par \par 6/6/19\par Pt is here for follow up.\par no new complaints \par Feels well.\par Is on week 2 of her Revlimid cycle. \par No transfusions since last visit\par \par 7/17/19\par Pt is here for follow up.\par C/O fatigue.\par Was away for a few days two weeks ago, but missed treatment with procrit for 2 weeks.\par Is complaint with Revlimid 2 weeks on 1 week off.\par Diarrhea is a little improved.\par \par 8/14/19\par Patient here for follow up visit, feeling well.  Although she is complaining of some pain at the left scapula area recently.  Patient denies cough, shortness of breath, denies fever, denies other bone pain.  She is off Revlimid  this week, due to restart on Monday.  She is scheduled for Procrit and Vitamin B12 injection today.  She plans to leave the country tomorrow to visit her mother who is ill.  She will return in about 10 days.\par \par 9/11/19\par Pt is here for follow up.\par She was away for 3 weeks, out of which she took procrit for 2 weeks in [place].\par She missed last week's dose.\par She had CBCs in [place] which showed Hgb of 8.9\par She feels well.\par She still getting diarrhea.\par She has been using lomotil with very minimal relief.\par She started a new cycle of Revlimid 4 days ago.\par \par 10/3/19\par Patient here for follow up visit, feeling fairly well.   Patient denies cough, shortness of breath, denies fever, denies other bone pain.  She remains on Revlimid.  She is scheduled for Procrit and Vitamin B12 injection today.\par \par 10/24/19\par Pt is here for follow up.\par Feels well.\par No SOB, fatigue.\par Compliant with procrit and Revl;imid.\par Remains on ASa.\par Diarrhea is unchanged.\par Pt takes imodium as needed.\par \par 11/14/19\par Pt is here for follow up.\par No new complaints.\par Starts a new cycle of Revlimid today.\par Diarrhea is same as before, no rectal bleeding.\par No fever.\par \par 12/5/19\par Pt is here for follow up.\par No new complaints.\par Denies feeling weaker than usual.\par No fever, SOB.\par No change in frequency of diarrhea.\par No pain.\par Will start next cycle of Revlimid in 3 days.\par \par \par !/16/20\par Pt was recently DCed from [facility] 3 days ago after being treated for pneumonia and MARCO.\par She is on po Levaquin.\par She restarted Revlmid 3 days ago.\par She is feeling better now. No fever.\par No SOB, Chest pain and back pain has resolved.\par Pt has a cough, no expectoration.\par She also recd a unit of PRBCs last week in the hospital\par \par \par 1/30/20\par Patient here for follow up visit, feeling well.  She has no new complaints. Cough is almost gone completely.  Patient denies shortness of breath, denies fever, denies bone pain.  Her appetite is ok, weight is stable.  She is due to restart Revlimid on Monday.\par \par 02/20/20\par Pt is here for follow up, feeling well.\par Offers no new complaints. Denies SOB, fever, chills, weight loss, CP, cough. \par Currently off week of Revlimid 5 mg. Restarts on Monday.\par Has procrit 80,000 units today. Next b 12 injection due in 2 weeks \par Did not get chest Xray\par CBC reviewed WBC 3.1, h/h 8.3/25%, plt 386, neutrophils 1.29\par \par 3/12/20\par Pt is here for follow up.\par She took Revlimid for 2 weeks and then has been off for one week although she was advised to take it daily without break.\par No SOB, Cough, fever.\par Has mild fatigue.\par \par 04/02/2020\par SIMONA KUHN a 75 year F is here today for follow up visit of MDS.\par Denies fever, chills, cough,night sweats, weight loss. \par Denies bleeding or bruising.\par Pt receives procrit 80,000 units weekly and B12 IM monthly. \par Continued Revlimid 5 mg daily x 3 weeks, has 1 dose left tonight. \par CBC reviewed: WBC 3.2, H/H 8.1/25.2, neutrophils 1.6, PLT 72\par \par 4/20/2020: The patient is here for follow up . She has no complaints of fever, chills, dizziness , chest pain and shortness of breath . She is still with diarrhea , up to 10 watery BMs per day, responds poorly to imodium and lomotil. She is on Revlimid 5 mg daily , took last dose yesterday night. Her last Procrit was on April 13. \par \par 5/26/20\par Pt is here for follow up.\par She is feeling well. Denies SOB, chest pain, fever.\par Continues to have diarrhea although she is off of Revlmid.\par Thinks it may due to diabetic meds.\par Wants to discuss BM bx results\par \par 6/22/20\par Pt is here for follow up.\par Feels well. Denies any fever, N, V, D\par Continues to have diarrhea, she will talk to her PCP about changing metformin for DM.\par Has been needing PRBCs 1 unit each month\par \par 7/20/20\par Pt is here for follow up.\par No new complaints. Has been feeling well.\par No SOB, CP. \par No N, V, D.\par She has recd 2 units of PRBCs since May 20.\par \par 8/17/20\par Pt is here for a follow up visit.\par She is feeling well.\par No new symptoms. No N, V, D.\par No bruising or bleeding. She continues to need PRBCs every 2-3 weeks.\par \par 8/31/20\par Pt is here for follow up. She is feeling well. No N, V, D.\par She is tolerating the hydrea well. No SOB, CP\par No bruising ior bleeding\par \par 9/8/20\par Pt is here for follow up.\par She had been contacted by the [facility] last week to advise her to stop the hydrea. Pt did not check her messages and continued with Hydrea.\par No fever, N, V, bleeding.\par Saw Dr Ferrell last week.\par \par 9/14/20\par Pt is here for folow up.\par Besides her usual complaint of diarrhea she is feeling well.\par Recd 1 unit PRBC last week.\par Has stopped Hydrea.\par No fever, mouth sores.\par \par 9/29/20\par Pt is here for folow up.\par No new complaints.\par Is seeing GI for diarrhea net week.\par No fever, night sweats.\par REcd 3 units of PRBC this month\par \par 10/13/20\par Pt is here for follow up.\par No new complaints.\par Has not needed a transfusion since 3 weeks ago.\par Continues to have diarrhea, waiting to be seen by GI\par \par 10/26/20\par Pt is here for follow up.\par C/O feeling fatigued.\par Has CLARK.\par No nausea or vomiting.\par No fever.\par Diarrhea has improved a little. She is no longer on Metformin\par \par 11/9/20\par Pt is here for follow up.\par Feels well. Denies SOB, CP, fatigue\par \par 12/7/20- Patient is for follow up and is due for cycle 9 of Vidaza.  She still has loose BM sometimes 10 times a day and was seeing Dr. Corey from GI- did not follow up recently and is unable to get an appt with him. She has lost about 10 lbs since September. No N/V. No fever or chills. No CP/SOB. She has been getting PRBC transfusion every 3 weeks now\par \par 01/04/20 Pt presented today for f/u visit . She is s/p 8 cycles of vidaza and was started on Luspatercept in Dec , 2020 . So far she received 1 injection , she is due for 2nd injection today . Adverse effect profile was discussed with her in detail , she reports having some dizziness and weakness after first injection . She received blood transfusion last wk . Blood work from today reviewed as well and counts are adequate . She denies any fevers,  chills,  or any new symptoms . \par \par 1/25/21\par Pt is here for follow up.\par C/O diarrhea, otherwise feeling better.\par Has not needed a transfusion since 12/29\par \par 2/16/21\par Pt is here for follow up.\par Needed transfusion on 2/8/21.\par Continues to C/o fatigue. Diarrhea remains the same, has not made GI appt as yet.\par \par 3/8/21\par Pt is here for follow up.\par Feels better today. Recd 1 unit PRBCs last week.\par Diarrhea is same as before. has an appt with GI next week.\par No fever\par \par 3/30/21\par Pt is here for follow up.\par Feels better. Last transfusion was on 3/2/21\par Saw GI and was started on cholestyramine and metformin was DCED with resolution of diarrhea.\par She denies any SOB,\par Fatigue is better\par \par 4/20/2021\par Pt is here to f/u for MDS\par She is s/p Luspatercept cycle #6\par She is compliant with Aspirin\par She feels better today, appetite is good\par She denies shortness of breath, fatigue, or weakness \par She c/o of diarrhea but it has improved since she was started on Cholestyramine. Stool is soft and less in frequency\par She received COVID vaccine x 2 doses\par \par 5/11/21\par pt is here for follow up.\par Feels the same with fatigue, diarrhea.\par No SOB\par \par 6/21/21\par Pt is here for follow up.\par Feels well. No SOB, CP, N< V.\par Diarrhea is better controlled now.\par Last PRBC transfusion was 2 weeks ago.\par \par 7/19/21\par Pt is here for follow up.\par Feels a little tired, last transfusion was 6/1/21.\par No bleeding, fever, SOB\par \par 8/10/2021\par Patient is here to follow up for her MDS.\par She is on Luspatercept, tolerating well.\par She feels well today, the appetite is good.\par She denies shortness of breath, fatigue, fever, night sweats, abdominal pain, arthralgias/myalgias.\par \par 8/31/21\par Pt is here for follow up.\par C/O feeling very fatigued.\par No bleeding.\par No fever.\par \par 9/21/2021\par Patient is here to follow up for MDS.\par She is on Luspatercept and Retacrit, tolerating well.\par She gets blood transfusion as needed for Hgb <7 gm/dL\par She is c/o of fatigue, no shortness of breath, dizziness, chills or lightheadedness.\par She denies melena or hematochezia.\par Her appetite is good, no nausea/vomiting.\par \par 10/28/2021\par Patient is here to follow up for MDS.\par She is on Luspatercept and Retacrit, tolerating well.\par She gets blood transfusion to keep Hgb > 7 gm/dL.\par She is c/o of chronic fatigue, no shortness of breath, dizziness, chills or lightheadedness.\par She denies melena or hematochezia.\par Her appetite is good, no nausea/vomiting.\par She c/o of severe diarrhea, 10-12x/day watery stool while on Imodium in the last 2 weeks.\par Last colonoscopy was in 2016, benign as per patient.\par \par 11/18/21\par Pt is here for follow up. C/O fatigue. No SOB, CP\par \par 12/9/21\par Pt is here for follow up.\par Feels better today. Recd 2 units last week in ER.\par Planning to go to [place] for over a week and does not want to miss her procrit dose.\par Diarrhea is stable,\par \par 12/23/21\par Pt is here for follow up.\par Feels better. No SOB, CP. Going to [place] tomorrow. Has not been able to get Procrit injection from the pharmacy yet d/t insurance issues\par \par 2/3/22\par Pt is here for follow up. Feeling same as usual. No SOB, CP. Last transfusion was 2 weeks ago in ER>\par No bleeding reported\par \par 3/3/22\par Pt is here for follow up.\par C/O fatigue. Had black stools X2\par \par 3/30/22\par Pt is feeling well.\par Here for treatment and BM biopsy [de-identified] : \par

## 2022-04-05 LAB — HEMATOPATHOLOGY REPORT: SIGNIFICANT CHANGE UP

## 2022-04-07 ENCOUNTER — LABORATORY RESULT (OUTPATIENT)
Age: 77
End: 2022-04-07

## 2022-04-07 ENCOUNTER — APPOINTMENT (OUTPATIENT)
Dept: INFUSION THERAPY | Facility: CLINIC | Age: 77
End: 2022-04-07

## 2022-04-07 LAB
BLUEBERRY IGG RAST: (no result)
BROCCOLI IGG RAST: SIGNIFICANT CHANGE UP
CARDAMON IGE RAST: SIGNIFICANT CHANGE UP
CARP IGE RAST: SIGNIFICANT CHANGE UP
CARROT IGG RAST: SIGNIFICANT CHANGE UP
CAULIFLOWER IGG RAST: SIGNIFICANT CHANGE UP
MYE ALLELE FREQUENCIES: SIGNIFICANT CHANGE UP
ONKOSIGHT MYELOID SEQUENCE: (no result)

## 2022-04-07 RX ORDER — ERYTHROPOIETIN 10000 [IU]/ML
80000 INJECTION, SOLUTION INTRAVENOUS; SUBCUTANEOUS ONCE
Refills: 0 | Status: COMPLETED | OUTPATIENT
Start: 2022-04-07 | End: 2022-04-07

## 2022-04-07 RX ORDER — PREGABALIN 225 MG/1
1000 CAPSULE ORAL ONCE
Refills: 0 | Status: COMPLETED | OUTPATIENT
Start: 2022-04-07 | End: 2022-04-07

## 2022-04-07 RX ADMIN — ERYTHROPOIETIN 80000 UNIT(S): 10000 INJECTION, SOLUTION INTRAVENOUS; SUBCUTANEOUS at 13:19

## 2022-04-07 RX ADMIN — PREGABALIN 1000 MICROGRAM(S): 225 CAPSULE ORAL at 13:18

## 2022-04-11 LAB
ALBUMIN SERPL ELPH-MCNC: 4.6 G/DL
ALP BLD-CCNC: 202 U/L
ALT SERPL-CCNC: 25 U/L
ANION GAP SERPL CALC-SCNC: 15 MMOL/L
AST SERPL-CCNC: 21 U/L
BILIRUB SERPL-MCNC: 0.8 MG/DL
BUN SERPL-MCNC: 25 MG/DL
CALCIUM SERPL-MCNC: 8.7 MG/DL
CHLORIDE SERPL-SCNC: 100 MMOL/L
CO2 SERPL-SCNC: 18 MMOL/L
CREAT SERPL-MCNC: 1.4 MG/DL
EGFR: 39 ML/MIN/1.73M2
GLUCOSE SERPL-MCNC: 378 MG/DL
HCT VFR BLD CALC: 22.6 %
HGB BLD-MCNC: 7.6 G/DL
MCHC RBC-ENTMCNC: 30 PG
MCHC RBC-ENTMCNC: 33.6 G/DL
MCV RBC AUTO: 89.3 FL
PLATELET # BLD AUTO: 675 K/UL
PMV BLD: 9.3 FL
POTASSIUM SERPL-SCNC: 5 MMOL/L
PROT SERPL-MCNC: 6.2 G/DL
RBC # BLD: 2.53 M/UL
RBC # FLD: 17.6 %
SODIUM SERPL-SCNC: 133 MMOL/L
WBC # FLD AUTO: 10.02 K/UL

## 2022-04-14 ENCOUNTER — LABORATORY RESULT (OUTPATIENT)
Age: 77
End: 2022-04-14

## 2022-04-14 ENCOUNTER — APPOINTMENT (OUTPATIENT)
Dept: INFUSION THERAPY | Facility: CLINIC | Age: 77
End: 2022-04-14

## 2022-04-14 ENCOUNTER — INPATIENT (INPATIENT)
Facility: HOSPITAL | Age: 77
LOS: 0 days | Discharge: HOME | End: 2022-04-15
Attending: STUDENT IN AN ORGANIZED HEALTH CARE EDUCATION/TRAINING PROGRAM | Admitting: STUDENT IN AN ORGANIZED HEALTH CARE EDUCATION/TRAINING PROGRAM
Payer: MEDICARE

## 2022-04-14 VITALS
OXYGEN SATURATION: 100 % | WEIGHT: 138.01 LBS | SYSTOLIC BLOOD PRESSURE: 148 MMHG | HEART RATE: 99 BPM | DIASTOLIC BLOOD PRESSURE: 60 MMHG | RESPIRATION RATE: 19 BRPM | HEIGHT: 62 IN | TEMPERATURE: 99 F

## 2022-04-14 LAB
ALBUMIN SERPL ELPH-MCNC: 3.5 G/DL — SIGNIFICANT CHANGE UP (ref 3.5–5.2)
ALBUMIN SERPL ELPH-MCNC: 3.8 G/DL — SIGNIFICANT CHANGE UP (ref 3.5–5.2)
ALP SERPL-CCNC: 141 U/L — HIGH (ref 30–115)
ALP SERPL-CCNC: 147 U/L — HIGH (ref 30–115)
ALT FLD-CCNC: 17 U/L — SIGNIFICANT CHANGE UP (ref 0–41)
ALT FLD-CCNC: 17 U/L — SIGNIFICANT CHANGE UP (ref 0–41)
ANION GAP SERPL CALC-SCNC: 14 MMOL/L — SIGNIFICANT CHANGE UP (ref 7–14)
ANION GAP SERPL CALC-SCNC: 15 MMOL/L — HIGH (ref 7–14)
ANISOCYTOSIS BLD QL: SIGNIFICANT CHANGE UP
AST SERPL-CCNC: 15 U/L — SIGNIFICANT CHANGE UP (ref 0–41)
AST SERPL-CCNC: 16 U/L — SIGNIFICANT CHANGE UP (ref 0–41)
B-OH-BUTYR SERPL-SCNC: <0.2 MMOL/L — SIGNIFICANT CHANGE UP
BASE EXCESS BLDV CALC-SCNC: -7.8 MMOL/L — LOW (ref -2–3)
BASE EXCESS BLDV CALC-SCNC: -9 MMOL/L — LOW (ref -2–3)
BASOPHILS # BLD AUTO: 0.09 K/UL — SIGNIFICANT CHANGE UP (ref 0–0.2)
BASOPHILS NFR BLD AUTO: 1.7 % — HIGH (ref 0–1)
BILIRUB SERPL-MCNC: 0.3 MG/DL — SIGNIFICANT CHANGE UP (ref 0.2–1.2)
BILIRUB SERPL-MCNC: 0.9 MG/DL — SIGNIFICANT CHANGE UP (ref 0.2–1.2)
BLD GP AB SCN SERPL QL: SIGNIFICANT CHANGE UP
BUN SERPL-MCNC: 26 MG/DL — HIGH (ref 10–20)
BUN SERPL-MCNC: 31 MG/DL — HIGH (ref 10–20)
CA-I SERPL-SCNC: 1.19 MMOL/L — SIGNIFICANT CHANGE UP (ref 1.15–1.33)
CA-I SERPL-SCNC: 1.25 MMOL/L — SIGNIFICANT CHANGE UP (ref 1.15–1.33)
CALCIUM SERPL-MCNC: 8.3 MG/DL — LOW (ref 8.5–10.1)
CALCIUM SERPL-MCNC: 8.6 MG/DL — SIGNIFICANT CHANGE UP (ref 8.5–10.1)
CHLORIDE SERPL-SCNC: 103 MMOL/L — SIGNIFICANT CHANGE UP (ref 98–110)
CHLORIDE SERPL-SCNC: 99 MMOL/L — SIGNIFICANT CHANGE UP (ref 98–110)
CO2 SERPL-SCNC: 14 MMOL/L — LOW (ref 17–32)
CO2 SERPL-SCNC: 17 MMOL/L — SIGNIFICANT CHANGE UP (ref 17–32)
CREAT SERPL-MCNC: 1.4 MG/DL — SIGNIFICANT CHANGE UP (ref 0.7–1.5)
CREAT SERPL-MCNC: 1.4 MG/DL — SIGNIFICANT CHANGE UP (ref 0.7–1.5)
EGFR: 39 ML/MIN/1.73M2 — LOW
EGFR: 39 ML/MIN/1.73M2 — LOW
EOSINOPHIL # BLD AUTO: 0 K/UL — SIGNIFICANT CHANGE UP (ref 0–0.7)
EOSINOPHIL NFR BLD AUTO: 0 % — SIGNIFICANT CHANGE UP (ref 0–8)
GAS PNL BLDV: 130 MMOL/L — LOW (ref 136–145)
GAS PNL BLDV: 133 MMOL/L — LOW (ref 136–145)
GAS PNL BLDV: SIGNIFICANT CHANGE UP
GAS PNL BLDV: SIGNIFICANT CHANGE UP
GIANT PLATELETS BLD QL SMEAR: PRESENT — SIGNIFICANT CHANGE UP
GLUCOSE SERPL-MCNC: 339 MG/DL — HIGH (ref 70–99)
GLUCOSE SERPL-MCNC: 447 MG/DL — HIGH (ref 70–99)
HCO3 BLDV-SCNC: 17 MMOL/L — LOW (ref 22–29)
HCO3 BLDV-SCNC: 18 MMOL/L — LOW (ref 22–29)
HCT VFR BLD CALC: 17.2 % — LOW (ref 37–47)
HCT VFR BLDA CALC: 19 % — CRITICAL LOW (ref 39–51)
HCT VFR BLDA CALC: 22 % — LOW (ref 39–51)
HGB BLD CALC-MCNC: 6.4 G/DL — CRITICAL LOW (ref 12.6–17.4)
HGB BLD CALC-MCNC: 7.4 G/DL — LOW (ref 12.6–17.4)
HGB BLD-MCNC: 5.8 G/DL — CRITICAL LOW (ref 12–16)
LACTATE BLDV-MCNC: 3.7 MMOL/L — HIGH (ref 0.5–2)
LACTATE BLDV-MCNC: 5.2 MMOL/L — CRITICAL HIGH (ref 0.5–2)
LYMPHOCYTES # BLD AUTO: 1.16 K/UL — LOW (ref 1.2–3.4)
LYMPHOCYTES # BLD AUTO: 22 % — SIGNIFICANT CHANGE UP (ref 20.5–51.1)
MANUAL SMEAR VERIFICATION: SIGNIFICANT CHANGE UP
MCHC RBC-ENTMCNC: 30.4 PG — SIGNIFICANT CHANGE UP (ref 27–31)
MCHC RBC-ENTMCNC: 33.7 G/DL — SIGNIFICANT CHANGE UP (ref 32–37)
MCV RBC AUTO: 90.1 FL — SIGNIFICANT CHANGE UP (ref 81–99)
MICROCYTES BLD QL: SLIGHT — SIGNIFICANT CHANGE UP
MONOCYTES # BLD AUTO: 0.18 K/UL — SIGNIFICANT CHANGE UP (ref 0.1–0.6)
MONOCYTES NFR BLD AUTO: 3.4 % — SIGNIFICANT CHANGE UP (ref 1.7–9.3)
NEUTROPHILS # BLD AUTO: 3.78 K/UL — SIGNIFICANT CHANGE UP (ref 1.4–6.5)
NEUTROPHILS NFR BLD AUTO: 72 % — SIGNIFICANT CHANGE UP (ref 42.2–75.2)
PCO2 BLDV: 36 MMHG — LOW (ref 39–42)
PCO2 BLDV: 39 MMHG — SIGNIFICANT CHANGE UP (ref 39–42)
PH BLDV: 7.26 — LOW (ref 7.32–7.43)
PH BLDV: 7.28 — LOW (ref 7.32–7.43)
PLAT MORPH BLD: NORMAL — SIGNIFICANT CHANGE UP
PLATELET # BLD AUTO: 524 K/UL — HIGH (ref 130–400)
PO2 BLDV: 29 MMHG — SIGNIFICANT CHANGE UP
PO2 BLDV: 36 MMHG — SIGNIFICANT CHANGE UP
POIKILOCYTOSIS BLD QL AUTO: SLIGHT — SIGNIFICANT CHANGE UP
POLYCHROMASIA BLD QL SMEAR: SIGNIFICANT CHANGE UP
POTASSIUM BLDV-SCNC: 4.4 MMOL/L — SIGNIFICANT CHANGE UP (ref 3.5–5.1)
POTASSIUM BLDV-SCNC: 4.5 MMOL/L — SIGNIFICANT CHANGE UP (ref 3.5–5.1)
POTASSIUM SERPL-MCNC: 4.6 MMOL/L — SIGNIFICANT CHANGE UP (ref 3.5–5)
POTASSIUM SERPL-MCNC: 5.2 MMOL/L — HIGH (ref 3.5–5)
POTASSIUM SERPL-SCNC: 4.6 MMOL/L — SIGNIFICANT CHANGE UP (ref 3.5–5)
POTASSIUM SERPL-SCNC: 5.2 MMOL/L — HIGH (ref 3.5–5)
PROT SERPL-MCNC: 4.9 G/DL — LOW (ref 6–8)
PROT SERPL-MCNC: 5.4 G/DL — LOW (ref 6–8)
RBC # BLD: 1.91 M/UL — LOW (ref 4.2–5.4)
RBC # FLD: 18.6 % — HIGH (ref 11.5–14.5)
RBC BLD AUTO: ABNORMAL
SAO2 % BLDV: 54 % — SIGNIFICANT CHANGE UP
SAO2 % BLDV: 55.3 % — SIGNIFICANT CHANGE UP
SARS-COV-2 RNA SPEC QL NAA+PROBE: SIGNIFICANT CHANGE UP
SODIUM SERPL-SCNC: 128 MMOL/L — LOW (ref 135–146)
SODIUM SERPL-SCNC: 134 MMOL/L — LOW (ref 135–146)
VARIANT LYMPHS # BLD: 0.9 % — SIGNIFICANT CHANGE UP (ref 0–5)
WBC # BLD: 5.25 K/UL — SIGNIFICANT CHANGE UP (ref 4.8–10.8)
WBC # FLD AUTO: 5.25 K/UL — SIGNIFICANT CHANGE UP (ref 4.8–10.8)

## 2022-04-14 PROCEDURE — 99285 EMERGENCY DEPT VISIT HI MDM: CPT

## 2022-04-14 RX ORDER — SODIUM CHLORIDE 9 MG/ML
1000 INJECTION, SOLUTION INTRAVENOUS ONCE
Refills: 0 | Status: COMPLETED | OUTPATIENT
Start: 2022-04-14 | End: 2022-04-14

## 2022-04-14 RX ORDER — ERYTHROPOIETIN 10000 [IU]/ML
80000 INJECTION, SOLUTION INTRAVENOUS; SUBCUTANEOUS ONCE
Refills: 0 | Status: COMPLETED | OUTPATIENT
Start: 2022-04-14 | End: 2022-04-28

## 2022-04-14 RX ORDER — INSULIN HUMAN 100 [IU]/ML
5 INJECTION, SOLUTION SUBCUTANEOUS ONCE
Refills: 0 | Status: COMPLETED | OUTPATIENT
Start: 2022-04-14 | End: 2022-04-14

## 2022-04-14 RX ORDER — SODIUM CHLORIDE 9 MG/ML
2000 INJECTION, SOLUTION INTRAVENOUS ONCE
Refills: 0 | Status: COMPLETED | OUTPATIENT
Start: 2022-04-14 | End: 2022-04-14

## 2022-04-14 RX ADMIN — INSULIN HUMAN 5 UNIT(S): 100 INJECTION, SOLUTION SUBCUTANEOUS at 22:24

## 2022-04-14 RX ADMIN — SODIUM CHLORIDE 2000 MILLILITER(S): 9 INJECTION, SOLUTION INTRAVENOUS at 19:45

## 2022-04-14 RX ADMIN — ERYTHROPOIETIN 80000 UNIT(S): 10000 INJECTION, SOLUTION INTRAVENOUS; SUBCUTANEOUS at 15:14

## 2022-04-14 NOTE — ED PROVIDER NOTE - PHYSICAL EXAMINATION
Vital Signs: Reviewed  GEN: alert, NAD, speaks full sentences  HEAD:  normocephalic, atraumatic  EYES:  PERRLA; conjunctival pallor, without injection, drainage or discharge  ENMT:  nasal mucosa moist; mouth moist without ulcerations or lesions; throat moist without erythema, exudate, ulcerations or lesions  NECK:  supple  CARDIAC:  regular rate, normal S1 and S2, no murmurs  RESP:  respiratory rate and effort appear normal for age; lungs are clear to auscultation bilaterally; no rales or wheezes  ABDOMEN:  soft, nontender, nondistended  MUSCULOSKELETAL/NEURO:  normal movement, normal tone  SKIN:  normal skin color for age and race, well-perfused; warm and dry

## 2022-04-14 NOTE — ED ADULT NURSE REASSESSMENT NOTE - NS ED NURSE REASSESS COMMENT FT1
pt awake. alert and oriented x3. airway patent with neg respiratory distress noted. pt tolerating blood transfusion, denies any chest pain, palpitations, dizziness, nausea vomiting, visual disturbances, sob. States she feels much better than before when she first came in. Ambulatory to the bathroom with steady gait. 2nd unit of blood is transfusing. No complaints voiced. Pt made comfortable.

## 2022-04-14 NOTE — ED ADULT NURSE NOTE - CHIEF COMPLAINT QUOTE
Patient was at the [facility] where they told her her hgb was "very low" <6.0- Patient complaining of weakness x1 day, denies any shortness of breath.

## 2022-04-14 NOTE — ED ADULT NURSE REASSESSMENT NOTE - NS ED NURSE REASSESS COMMENT FT1
Pt awake, laying in bed comfortably. A.o x 3. airway patent with equal respirations. No distress noted. arom x4 extrem. IV intact with Blood product infusing. Pt denies any dizziness, nausea ro vomiting, chest pain or palpitations. No complains were voiced. Safety precautions maintained.

## 2022-04-14 NOTE — ED PROVIDER NOTE - OBJECTIVE STATEMENT
77yF pmhx DM, MDS sent in by [facility] for anemia; constant, stable; pt states she has been requiring blood transfusions roughly every month, has only been feeling fatigued for the past day; denies lightheadedness, chest pain, SOB, abd pain, n/v/d. Pt denies dysuria, urinary frequency, polydipsia.

## 2022-04-14 NOTE — ED ADULT TRIAGE NOTE - CHIEF COMPLAINT QUOTE
Patient was at the [facility] where they told her her hgb was "very low" <6.0- Patient complaining of weakness x1 day, denies any shortness of breath.

## 2022-04-14 NOTE — ED PROVIDER NOTE - CLINICAL SUMMARY MEDICAL DECISION MAKING FREE TEXT BOX
77-year-old female presents to the ED for anemia requiring blood transfusion.  Patient was evaluated in the cancer center at [facility] and was referred to the ED for anemia.  Initial vitals reviewed by me noted to be within normal limits.  Physical exam revealed mild conjunctival pallor otherwise normal physical exam.  We obtained labs which revealed anemia–5.8, mild hyponatremia–128, mildly elevated–15, elevated lactate–3.7 consistent with metabolic acidosis noted on VBG and high anion gap metabolic acidosis.  2 units PRBCs ordered for the patient consent obtained.  Admitted to inpatient medicine for transfusion and anemia work-up.

## 2022-04-14 NOTE — ED PROVIDER NOTE - NS ED ROS FT
Review of Systems:  	•	CONSTITUTIONAL - no fever, no diaphoresis, +fatigue  	•	SKIN - no rash, no lesions  	•	HEMATOLOGIC - no bleeding, no bruising  	•	EYES - no discharge, no injection  	•	ENT - no sore throat, no runny nose  	•	RESPIRATORY - no shortness of breath, no cough  	•	CARDIAC - no chest pain, no palpitations  	•	GI - no abd pain, no nausea, no vomiting, no diarrhea  	•	GENITO-URINARY - no dysuria, no hematuria  	•	MUSCULOSKELETAL - no joint pain, no muscle aches  	•	NEUROLOGIC - no dizziness, no headache

## 2022-04-14 NOTE — ED PROVIDER NOTE - PROGRESS NOTE DETAILS
AG: d/w pulm fellow, req 3L fluids and rpt VBG. DKA likely due to anemia/dehydration in a pt w/o other symptoms of DKA.

## 2022-04-15 ENCOUNTER — TRANSCRIPTION ENCOUNTER (OUTPATIENT)
Age: 77
End: 2022-04-15

## 2022-04-15 VITALS
SYSTOLIC BLOOD PRESSURE: 152 MMHG | RESPIRATION RATE: 19 BRPM | OXYGEN SATURATION: 99 % | DIASTOLIC BLOOD PRESSURE: 70 MMHG | HEART RATE: 84 BPM | TEMPERATURE: 98 F

## 2022-04-15 LAB
A1C WITH ESTIMATED AVERAGE GLUCOSE RESULT: 7.4 % — HIGH (ref 4–5.6)
ALBUMIN SERPL ELPH-MCNC: 4 G/DL — SIGNIFICANT CHANGE UP (ref 3.5–5.2)
ALP SERPL-CCNC: 148 U/L — HIGH (ref 30–115)
ALT FLD-CCNC: 19 U/L — SIGNIFICANT CHANGE UP (ref 0–41)
ANION GAP SERPL CALC-SCNC: 11 MMOL/L — SIGNIFICANT CHANGE UP (ref 7–14)
APTT BLD: 27.8 SEC — SIGNIFICANT CHANGE UP (ref 27–39.2)
AST SERPL-CCNC: 16 U/L — SIGNIFICANT CHANGE UP (ref 0–41)
BILIRUB SERPL-MCNC: 1 MG/DL — SIGNIFICANT CHANGE UP (ref 0.2–1.2)
BUN SERPL-MCNC: 25 MG/DL — HIGH (ref 10–20)
CALCIUM SERPL-MCNC: 8.9 MG/DL — SIGNIFICANT CHANGE UP (ref 8.5–10.1)
CHLORIDE SERPL-SCNC: 108 MMOL/L — SIGNIFICANT CHANGE UP (ref 98–110)
CHOLEST SERPL-MCNC: 91 MG/DL — SIGNIFICANT CHANGE UP
CO2 SERPL-SCNC: 21 MMOL/L — SIGNIFICANT CHANGE UP (ref 17–32)
CREAT SERPL-MCNC: 1.3 MG/DL — SIGNIFICANT CHANGE UP (ref 0.7–1.5)
CRP SERPL-MCNC: 4 MG/L — SIGNIFICANT CHANGE UP
EGFR: 43 ML/MIN/1.73M2 — LOW
ESTIMATED AVERAGE GLUCOSE: 166 MG/DL — HIGH (ref 68–114)
FERRITIN SERPL-MCNC: 2171 NG/ML — HIGH (ref 15–150)
FOLATE SERPL-MCNC: 7.2 NG/ML — SIGNIFICANT CHANGE UP
GLUCOSE SERPL-MCNC: 99 MG/DL — SIGNIFICANT CHANGE UP (ref 70–99)
HCT VFR BLD CALC: 28.1 % — LOW (ref 37–47)
HDLC SERPL-MCNC: 44 MG/DL — LOW
HGB BLD-MCNC: 9.7 G/DL — LOW (ref 12–16)
INR BLD: 1.17 RATIO — SIGNIFICANT CHANGE UP (ref 0.65–1.3)
LACTATE SERPL-SCNC: 1.8 MMOL/L — SIGNIFICANT CHANGE UP (ref 0.7–2)
LIPID PNL WITH DIRECT LDL SERPL: 29 MG/DL — SIGNIFICANT CHANGE UP
MAGNESIUM SERPL-MCNC: 1.7 MG/DL — LOW (ref 1.8–2.4)
MCHC RBC-ENTMCNC: 30.2 PG — SIGNIFICANT CHANGE UP (ref 27–31)
MCHC RBC-ENTMCNC: 34.5 G/DL — SIGNIFICANT CHANGE UP (ref 32–37)
MCV RBC AUTO: 87.5 FL — SIGNIFICANT CHANGE UP (ref 81–99)
NON HDL CHOLESTEROL: 47 MG/DL — SIGNIFICANT CHANGE UP
NRBC # BLD: 0 /100 WBCS — SIGNIFICANT CHANGE UP (ref 0–0)
PLATELET # BLD AUTO: 460 K/UL — HIGH (ref 130–400)
POTASSIUM SERPL-MCNC: 4.9 MMOL/L — SIGNIFICANT CHANGE UP (ref 3.5–5)
POTASSIUM SERPL-SCNC: 4.9 MMOL/L — SIGNIFICANT CHANGE UP (ref 3.5–5)
PROT SERPL-MCNC: 5.5 G/DL — LOW (ref 6–8)
PROTHROM AB SERPL-ACNC: 13.4 SEC — HIGH (ref 9.95–12.87)
RBC # BLD: 3.21 M/UL — LOW (ref 4.2–5.4)
RBC # FLD: 15 % — HIGH (ref 11.5–14.5)
SODIUM SERPL-SCNC: 140 MMOL/L — SIGNIFICANT CHANGE UP (ref 135–146)
TRIGL SERPL-MCNC: 89 MG/DL — SIGNIFICANT CHANGE UP
TROPONIN T SERPL-MCNC: <0.01 NG/ML — SIGNIFICANT CHANGE UP
TSH SERPL-MCNC: 2.27 UIU/ML — SIGNIFICANT CHANGE UP (ref 0.27–4.2)
VIT B12 SERPL-MCNC: 1758 PG/ML — HIGH (ref 232–1245)
WBC # BLD: 5.37 K/UL — SIGNIFICANT CHANGE UP (ref 4.8–10.8)
WBC # FLD AUTO: 5.37 K/UL — SIGNIFICANT CHANGE UP (ref 4.8–10.8)

## 2022-04-15 PROCEDURE — 99223 1ST HOSP IP/OBS HIGH 75: CPT

## 2022-04-15 PROCEDURE — 93970 EXTREMITY STUDY: CPT | Mod: 26

## 2022-04-15 PROCEDURE — 99222 1ST HOSP IP/OBS MODERATE 55: CPT

## 2022-04-15 PROCEDURE — 99239 HOSP IP/OBS DSCHRG MGMT >30: CPT

## 2022-04-15 RX ORDER — GLIMEPIRIDE 1 MG
1 TABLET ORAL
Qty: 0 | Refills: 0 | DISCHARGE

## 2022-04-15 RX ORDER — PANTOPRAZOLE SODIUM 20 MG/1
40 TABLET, DELAYED RELEASE ORAL
Refills: 0 | Status: DISCONTINUED | OUTPATIENT
Start: 2022-04-15 | End: 2022-04-15

## 2022-04-15 RX ORDER — CHLORHEXIDINE GLUCONATE 213 G/1000ML
1 SOLUTION TOPICAL
Refills: 0 | Status: DISCONTINUED | OUTPATIENT
Start: 2022-04-15 | End: 2022-04-15

## 2022-04-15 RX ORDER — MAGNESIUM SULFATE 500 MG/ML
2 VIAL (ML) INJECTION ONCE
Refills: 0 | Status: COMPLETED | OUTPATIENT
Start: 2022-04-15 | End: 2022-04-15

## 2022-04-15 RX ADMIN — CHLORHEXIDINE GLUCONATE 1 APPLICATION(S): 213 SOLUTION TOPICAL at 05:50

## 2022-04-15 RX ADMIN — Medication 25 GRAM(S): at 07:59

## 2022-04-15 RX ADMIN — PANTOPRAZOLE SODIUM 40 MILLIGRAM(S): 20 TABLET, DELAYED RELEASE ORAL at 05:49

## 2022-04-15 NOTE — DISCHARGE NOTE PROVIDER - CARE PROVIDERS DIRECT ADDRESSES
[Dear  ___] : Dear  [unfilled], [Consult Letter:] : I had the pleasure of evaluating your patient, [unfilled]. [Consult Closing:] : Thank you very much for allowing me to participate in the care of this patient.  If you have any questions, please do not hesitate to contact me. [Please see my note below.] : Please see my note below. [Sincerely,] : Sincerely, [FreeTextEntry3] : Jose Martin Hawkins MD ,[email or web address]

## 2022-04-15 NOTE — DISCHARGE NOTE PROVIDER - NPI NUMBER (FOR SYSADMIN USE ONLY) :
Health Maintenance Summary     Topic Due On Due Status Completed On Postpone Until Reason    MAMMOGRAM - BREAST CANCER SCREENING Dec 7, 2018 Not Due Dec 7, 2016      Pap Smear - Cervical Cancer Screening  Jan 11, 2018 Not Due Jan 11, 2013      Colorectal Cancer Screening - Blood in Stool Test Jan 4, 2018 Not Due Jan 4, 2017      Immunization-Zoster Jul 11, 2012 Postponed  Jan 26, 2018 Insurance or Financial    Diabetes Eye Exam Apr 25, 2017 Due On Apr 25, 2016      Glycohemoglobin A1C  (Diabetes Sugar)  Oct 19, 2017 Not Due Apr 19, 2017      GFR  (Kidney Function Test)  Apr 19, 2018 Not Due Apr 19, 2017      Immunization - TDAP Pregnancy  Hidden       Diabetes Foot Exam  Aug 22, 2017 Not Due Aug 22, 2016      Medicare Wellness Visit Jul 11, 2017 Due Soon Nov 23, 2015      IMMUNIZATION - DTaP/Tdap/Td Kemal 10, 2001 Postponed Jan 9, 2001 Mar 24, 2018 Insurance or Financial    Immunization-Influenza Sep 1, 2017 Postponed Oct 25, 2005 Jan 26, 2018 Patient Refused          Patient is due for topics as listed above, she wishes to decline at this time .       [[id number]],[[id number]] [[id number]],[[id number]],[[id number]] [[id number]],[[id number]],[[id number]],[[id number]]

## 2022-04-15 NOTE — CONSULT NOTE ADULT - SUBJECTIVE AND OBJECTIVE BOX
Patient is a 77y old  Female who presents with a chief complaint of Anemia (15 Apr 2022 00:13)      HPI:  77 years old female with PMHx of T2DM, MDS on lusparacept / retacrit comes in for evaluation of anemia.    Pt went to [facility] today for weekly chemotherapy injections and on lab workup was found to have severe anemia with Hb 6.8. Reports weakness, fatigue and dizziness and hence was referred to the ED. Also endorses chronic diarrhea for past few months secondary to chemotherapy. Otherwise denies any fever, cough, SOB, n/v/d or urinary issues.     In the ED, vitals are WNL except for /78, Labs significant for Hb 5.8, lactate 3.70 > 5,20 and VBG shows Ph 7.26 w/ HCO 18. Pt received 2 unites PRBC. admitted to SDU, called to evaluate, this am on RA      PAST MEDICAL & SURGICAL HISTORY:  DM (diabetes mellitus)    MDS (myelodysplastic syndrome)    No significant past surgical history        SOCIAL HX:   Smoking -    FAMILY HISTORY:  No pertinent family history in first degree relatives        REVIEW OF SYSTEMS SEE HPI      Allergies    No Known Allergies    Intolerances        chlorhexidine 4% Liquid 1 Application(s) Topical <User Schedule>  pantoprazole    Tablet 40 milliGRAM(s) Oral before breakfast  : Home Meds:      PHYSICAL EXAM    ICU Vital Signs Last 24 Hrs  T(C): 37.3 (14 Apr 2022 23:56), Max: 37.4 (14 Apr 2022 21:45)  T(F): 99.2 (14 Apr 2022 23:56), Max: 99.3 (14 Apr 2022 21:45)  HR: 86 (14 Apr 2022 23:56) (86 - 99)  BP: 182/78 (14 Apr 2022 23:56) (148/60 - 182/78)  RR: 18 (14 Apr 2022 23:56) (18 - 19)  SpO2: 100% (14 Apr 2022 23:56) (99% - 100%)      General: comfortbale  HEENT:  SID              Lymph Nodes: No cervical LN   Lungs: Bilateral BS  Cardiovascular: DILCIA 2.6  Abdomen: Soft, Positive BS  Extremities: No clubbing  Skin: Warm  Neurological: Non focal         LABS:                          5.8    5.25  )-----------( 524      ( 14 Apr 2022 16:15 )             17.2                                               04-14    134<L>  |  103  |  26<H>  ----------------------------<  339<H>  4.6   |  17  |  1.4    Ca    8.6      14 Apr 2022 21:10    TPro  4.9<L>  /  Alb  3.5  /  TBili  0.9  /  DBili  x   /  AST  15  /  ALT  17  /  AlkPhos  141<H>  04-14                                                                                           LIVER FUNCTIONS - ( 14 Apr 2022 21:10 )  Alb: 3.5 g/dL / Pro: 4.9 g/dL / ALK PHOS: 141 U/L / ALT: 17 U/L / AST: 15 U/L / GGT: x                                                                                                                                           MEDICATIONS  (STANDING):  chlorhexidine 4% Liquid 1 Application(s) Topical <User Schedule>  pantoprazole    Tablet 40 milliGRAM(s) Oral before breakfast    MEDICATIONS  (PRN):

## 2022-04-15 NOTE — PHYSICAL THERAPY INITIAL EVALUATION ADULT - GAIT DISTANCE, PT EVAL
x1, pt was requesting a straight cane for d/c home, pt did not have any LOB now, but she stated she does have some days when she feels off balance/100 feet

## 2022-04-15 NOTE — DISCHARGE NOTE PROVIDER - NSDCMRMEDTOKEN_GEN_ALL_CORE_FT
glimepiride 2 mg oral tablet: 1 tab(s) orally 2 times a day  metFORMIN: 500 milligram(s) orally 2 times a day  Procrit: injectable every 7 days   glimepiride 4 mg oral tablet: 1 tab(s) orally 2 times a day  metFORMIN: 500 milligram(s) orally 2 times a day  Procrit: injectable every 7 days  repaglinide 0.5 mg oral tablet: 1 tab(s) orally 3 times a day (before meals)

## 2022-04-15 NOTE — CONSULT NOTE ADULT - ASSESSMENT
IMPRESSION:    Anemia/ MDS no active bleed  increase LA ( anemia/ no evidence of sepsis, not taking metformin)  hyperglycemia ( nl betahydroxy)  metabolic acidosis ( high LA/ diarrhea)  HTN      PLAN:    CNS: Avoid CNS depressant    HEENT:  Oral care    PULMONARY:  HOB @ 45 degrees, aspiration precaution, keep Sao2 88 to 92%    CARDIOVASCULAR: FUP LA    GI: GI prophylaxis                                          Feeding po    RENAL:  F/u  lytes.  Correct as needed. accurate I/O    INFECTIOUS DISEASE: Procal    HEMATOLOGICAL:  DVT prophylaxis. LE doppler, hemoc eval    ENDOCRINE:  Follow up FS.  Insulin protocol if needed.     if LA improving floor    
76 y/o female with PMHx of T2DM, MDS on lusparacept / retacrit presenting for anemia with Hb 6.8 and associated weakness, fatigue and dizziness. Also endorses chronic diarrhea for past few months secondary to chemotherapy. Patient s/p 9 cycles of Vidaza since 1/2021 with chronic normocytic anemia while on chemo and since stopping and has required many pRBC transfusions. In the ED, labs significant for Hb 5.8, lactate 3.70 > 5,20 and VBG shows Ph 7.26 w/ HCO 18. Pt received 2 units pRBC. GI was consulted for anemia. Patient denies melena, hematochezia, hemoptysis, epigastric pain, dysphagia. She notes chronic diarrhea for years, which has worsened over the last 6 months with 5-10 non-bloody loose painless BM throughout the day with no associated abdominal pain. She visited Dr. Morales March 2022 for diarrhea and he recommended infectious workup, TTG, IgA, and colonoscopy. Last colonoscopy 2017 was unremarkable.    #) Chronic Normocytic Anemia  - no overt GI bleeding; pt denies melena, hematochezia  - more likely 2/2 MDS vs GI bleed given hx normal colonoscopy  - s/p 2 unit pRBC with adequate response Hb 5.8 -> 9.7    Recs:  - ferritin and iron studies  - outpatient colonoscopy  - continue plan per heme/onc    #) Chronic Diarrhea  - non-bloody diarrhea x years, increased for 6 months with 5-10 BM/day and associated unintentional 10 lb weight loss over 6 months  - likely component of enteritis 2/2 prior chemo and current lusparacept, but further workup needed to r/o infectious, celiac, microscopic colitis  - hemodynamically stable and electrolytes wnl    Recs:  - f/u outpatient workup (infectious workup, TTG, IgA) per Dr. Morales   - outpatient colonoscopy (patient reports she is working on scheduling soon)    recall as needed

## 2022-04-15 NOTE — H&P ADULT - HISTORY OF PRESENT ILLNESS
77 years old female with PMHx of T2DM, MDS on lusparacept / retacrit comes in for evaluation of anemia.    Pt went to [facility] clinic today for weekly chemotherapy injections and on lab workup was found to have severe anemia with Hb 6.8. Reports weakness, fatigue and dizziness and hence was referred to the ED. Also endorses chronic diarrhea for past few months secondary to chemotherapy. Otherwise denies any fever, cough, SOB, n/v/d or urinary issues.     In the ED, vitals are WNL except for /78, Labs significant for Hb 5.8, lactate 3.70 > 5,20 and VBG shows Ph 7.26 w/ HCO 18. Pt received 2 unites PRBC. To be admitted under medicine for further workup.

## 2022-04-15 NOTE — PHYSICAL THERAPY INITIAL EVALUATION ADULT - PHYSICAL ASSIST/NONPHYSICAL ASSIST: STAIR NEGOTIATION, REHAB EVAL
Peripheral Block    Patient location during procedure: pre-op  Start time: 4/15/2019 5:40 PM  End time: 4/15/2019 5:50 PM  Staffing  Anesthesiologist: Eb Goode MD  Performed: anesthesiologist   Preanesthetic Checklist  Completed: patient identified, site marked, surgical consent, pre-op evaluation, timeout performed, IV checked, risks and benefits discussed, monitors and equipment checked, anesthesia consent given, oxygen available and patient being monitored  Peripheral Block  Patient position: sitting  Prep: ChloraPrep  Patient monitoring: cardiac monitor, continuous pulse ox, frequent blood pressure checks and IV access  Block type: Brachial plexus  Laterality: left  Injection technique: single-shot  Procedures: ultrasound guided  Local infiltration: lidocaine  Infiltration strength: 1 %  Dose: 3 mL  Interscalene  Provider prep: mask and sterile gloves  Local infiltration: lidocaine  Needle  Needle gauge: 21 G  Needle length: 10 cm  Needle localization: ultrasound guidance  Assessment  Injection assessment: negative aspiration for heme, no paresthesia on injection and local visualized surrounding nerve on ultrasound  Paresthesia pain: none  Slow fractionated injection: yes  Hemodynamics: stable  Additional Notes  Immediately prior to procedure a \"time out\" was called to verify the correct patient, allergies, laterality, procedure and equipment.  Time out performed with RN    Reason for block: post-op pain management and at surgeon's request verbal cues/1 person assist

## 2022-04-15 NOTE — DISCHARGE NOTE PROVIDER - CARE PROVIDER_API CALL
Kaveh Wade)  Hematology; Internal Medicine; Medical Oncology  [address] [place] [address]  Phone: [phone]  Fax: [phone]  Follow Up Time: 1 week    Kaiser Morales)  Gastroenterology; Internal Medicine  [address] [place] [address]  Phone: [phone]  Fax: [phone]  Follow Up Time: 1 week   Kaveh Wade)  Hematology; Internal Medicine; Medical Oncology  [address] [address]  Phone: [phone]  Fax: [phone]  Follow Up Time: 1 week    Kaiser Morales)  Gastroenterology; Internal Medicine  [address] [place] [address]  Phone: [phone]  Fax: [phone]  Follow Up Time: 1 week    ERNESTO SIEGEL  Internal Medicine  [address] [place] [address]  Phone: [phone]  Fax: [phone])-  Follow Up Time: 2 weeks   Kaveh Wade)  Hematology; Internal Medicine; Medical Oncology  [address] [address]  Phone: [phone]  Fax: [phone]  Follow Up Time: 1 week    Kaiser Morales)  Gastroenterology; Internal Medicine  [address] [place] [address]  Phone: [phone]  Fax: [phone]  Follow Up Time: 1 week    ERNESTO SIEGEL  Internal Medicine  [address] [place] [address]  Phone: [phone]  Fax: [phone])-  Follow Up Time: 2 weeks    Hussain Leyva)  Internal Medicine; Nephrology  [address] [place] [address]  Phone: [phone]  Fax: [phone]  Follow Up Time: 2 weeks

## 2022-04-15 NOTE — DISCHARGE NOTE NURSING/CASE MANAGEMENT/SOCIAL WORK - NSDCPEFALRISK_GEN_ALL_CORE
For information on Fall & Injury Prevention, visit: [email or web address] OR  [email or web address] OR  https://www.cdc.gov/steadi/patient.html

## 2022-04-15 NOTE — PHYSICAL THERAPY INITIAL EVALUATION ADULT - PERTINENT HX OF CURRENT PROBLEM, REHAB EVAL
pt adm for anemia, lactic acidosis, hyperglycemia, pt is s/p 2 units PRBCs, pt stated she feels netter since receiving the blood

## 2022-04-15 NOTE — DISCHARGE NOTE PROVIDER - HOSPITAL COURSE
77 years old female with PMHx of T2DM, MDS on lusparacept / retacrit comes in for evaluation of anemia.    Pt went to [facility] today for weekly chemotherapy injections and on lab workup was found to have severe anemia with Hb 6.8. Reports weakness, fatigue and dizziness and hence was referred to the ED. Also endorses chronic diarrhea for past few months secondary to chemotherapy. Otherwise denies any fever, cough, SOB, n/v/d or urinary issues.     In the ED, vitals are WNL except for /78, Labs significant for Hb 5.8, lactate 3.70 > 5,20 and VBG shows Ph 7.26 w/ HCO 18. Pt received 2 unites PRBC. To be admitted under medicine for further workup.      Impression:  Acute on chronic normocytic anemia  HAGMA w/ NAGMA - lactic acidosis in setting of anemia and chronic diarrhea   HO MDS  HO T2DM  Elevated BP    # Acute on chronic normocytic anemia  - Hb on admission 5.8 --> s/p 2 units PRBC; Hb 9.7  - f/u  b12 , folate, iron, ferritin, tibc, ldh,   - trend cbc, keep active type and screen and transfuse if if hb < 7  - Protonix PO qd  - stool guaiac  - keep 2 large bore IV lines   - GI consult Dr Morales for possible EGD and colonoscopy   - Discussed with Hem/onc - no further Inpatient hem/onc workup planned    # HAGMA w/ NAGMA - lactic acidosis in setting of anemia and chronic diarrhea  - Resolved s/p IV hydration/PRBC transfusion  # CKD  - nephrology consulted to establish care    # HO MDS - f/u heme/onc OP  # HO T2DM -   hold oral meds on DC  # Elevated BP - no hx of HTN   77 years old female with PMHx of T2DM, MDS on lusparacept / retacrit comes in for evaluation of anemia.    Pt went to [facility] today for weekly chemotherapy injections and on lab workup was found to have severe anemia with Hb 6.8. Reports weakness, fatigue and dizziness and hence was referred to the ED. Also endorses chronic diarrhea for past few months secondary to chemotherapy. Otherwise denies any fever, cough, SOB, n/v/d or urinary issues.     In the ED, vitals are WNL except for /78, Labs significant for Hb 5.8, lactate 3.70 > 5,20 and VBG shows Ph 7.26 w/ HCO 18. Pt received 2 unites PRBC. To be admitted under medicine for further workup.      Impression:  Acute on chronic normocytic anemia  HAGMA w/ NAGMA - lactic acidosis in setting of anemia and chronic diarrhea   HO MDS  HO T2DM  Elevated BP    # Acute on chronic normocytic anemia  - Hb on admission 5.8 --> s/p 2 units PRBC; Hb 9.7  - f/u  b12 , folate, iron, ferritin, tibc, ldh,   - trend cbc, keep active type and screen and transfuse if if hb < 7  - Protonix PO qd  - stool guaiac  - keep 2 large bore IV lines   - GI consult Dr Morales for possible EGD and colonoscopy   - Discussed with Hem/onc - no further Inpatient hem/onc workup planned    # HAGMA w/ NAGMA - lactic acidosis in setting of anemia and chronic diarrhea  - Resolved s/p IV hydration/PRBC transfusion  # CKD  - OP nephro f/u    # HO MDS - f/u heme/onc OP  # HO T2DM -   hold oral meds on DC  # Elevated BP - no hx of HTN   77 years old female with PMHx of T2DM, MDS on lusparacept / retacrit comes in for evaluation of anemia.    Pt went to [facility] today for weekly chemotherapy injections and on lab workup was found to have severe anemia with Hb 6.8. Reports weakness, fatigue and dizziness and hence was referred to the ED. Also endorses chronic diarrhea for past few months secondary to chemotherapy. Otherwise denies any fever, cough, SOB, n/v/d or urinary issues.     In the ED, vitals are WNL except for /78, Labs significant for Hb 5.8, lactate 3.70 > 5,20 and VBG shows Ph 7.26 w/ HCO 18. Pt received 2 unites PRBC. To be admitted under medicine for further workup.      Impression:  Acute on chronic normocytic anemia  HAGMA w/ NAGMA - lactic acidosis in setting of anemia and chronic diarrhea   HO MDS  HO T2DM  Elevated BP    # Acute on chronic normocytic anemia  - Hb on admission 5.8 --> s/p 2 units PRBC; Hb 9.7  - f/u  b12 , folate, iron, ferritin, tibc, ldh,   - trend cbc, keep active type and screen and transfuse if if hb < 7  - Protonix PO qd  - stool guaiac  - keep 2 large bore IV lines   - GI consulted - Outpatient colonoscopy and workup for chronic anemia.   - Discussed with Hem/onc - no further Inpatient hem/onc workup planned    # HAGMA w/ NAGMA - lactic acidosis in setting of anemia and chronic diarrhea  - Resolved s/p IV hydration/PRBC transfusion  # CKD  - OP nephro f/u    # HO MDS - f/u heme/onc OP  # HO T2DM -   hold oral meds on DC  # Elevated BP - no hx of HTN   77 years old female with PMHx of T2DM, MDS on lusparacept / retacrit comes in for evaluation of anemia.    Pt went to [facility] today for weekly chemotherapy injections and on lab workup was found to have severe anemia with Hb 6.8. Reports weakness, fatigue and dizziness and hence was referred to the ED. Also endorses chronic diarrhea for past few months secondary to chemotherapy. Otherwise denies any fever, cough, SOB, n/v/d or urinary issues.     In the ED, vitals are WNL except for /78, Labs significant for Hb 5.8, lactate 3.70 > 5,20 and VBG shows Ph 7.26 w/ HCO 18. Pt received 2 unites PRBC. To be admitted under medicine for further workup.      Impression:  Acute on chronic normocytic anemia  HAGMA w/ NAGMA - lactic acidosis in setting of anemia and chronic diarrhea   HO MDS  HO T2DM  Elevated BP    # Acute on chronic normocytic anemia  - Hb on admission 5.8 --> s/p 2 units PRBC; Hb 9.7  - f/u  b12 , folate, iron, ferritin, tibc, ldh,   - trend cbc, keep active type and screen and transfuse if if hb < 7  - Protonix PO qd  - stool guaiac  - keep 2 large bore IV lines   - GI consulted - Outpatient colonoscopy and workup for chronic anemia.   - Discussed with Hem/onc - no further Inpatient hem/onc workup planned    # HAGMA w/ NAGMA - lactic acidosis in setting of anemia and chronic diarrhea  - Resolved s/p IV hydration/PRBC transfusion  # CKD  - OP nephro f/u    # HO MDS - f/u heme/onc OP  # HO T2DM -   continue with home meds on dc  # Elevated BP - no hx of HTN

## 2022-04-15 NOTE — CONSULT NOTE ADULT - SUBJECTIVE AND OBJECTIVE BOX
SIMONA KUHN 77y Female  MRN#: [id number]     - Hospital Day: 1d    - The patient is a 78 y/o female with PMHx of T2DM, MDS on lusparacept / retacrit presenting for anemia. Pt went to [facility] today for lusparacept injections and on lab workup was found to have severe anemia with Hb 6.8. Reports weakness, fatigue and dizziness and hence was referred to the ED. Also endorses chronic diarrhea for past few months secondary to chemotherapy. Otherwise denies any fever, cough, SOB, n/v/d or urinary issues. In the ED, vitals are WNL except for /78, Labs significant for Hb 5.8, lactate 3.70 > 5,20 and VBG shows Ph 7.26 w/ HCO 18. Pt received 2 unites PRBC. To be admitted under medicine for further workup.         OBJECTIVE  PAST MEDICAL & SURGICAL HISTORY  DM (diabetes mellitus)    MDS (myelodysplastic syndrome)                                              -----------------------------------------------------------  ALLERGIES:  No Known Allergies                                            ------------------------------------------------------------    HOME MEDICATIONS  Home Medications:  glimepiride 2 mg oral tablet: 1 tab(s) orally 2 times a day (19 Nov 2021 04:21)  metFORMIN: 500 milligram(s) orally 2 times a day (27 May 2020 17:13)  Procrit: injectable every 7 days (19 Nov 2021 04:24)                           MEDICATIONS:  STANDING MEDICATIONS  chlorhexidine 4% Liquid 1 Application(s) Topical <User Schedule>  pantoprazole    Tablet 40 milliGRAM(s) Oral before breakfast    PRN MEDICATIONS                                            ------------------------------------------------------------  VITAL SIGNS: Last 24 Hours  T(C): 36.8 (15 Apr 2022 12:00), Max: 37.4 (14 Apr 2022 21:45)  T(F): 98.2 (15 Apr 2022 12:00), Max: 99.3 (14 Apr 2022 21:45)  HR: 79 (15 Apr 2022 12:00) (74 - 99)  BP: 146/68 (15 Apr 2022 12:00) (146/68 - 182/78)  BP(mean): 98 (15 Apr 2022 12:00) (98 - 106)  RR: 18 (15 Apr 2022 12:00) (18 - 19)  SpO2: 100% (15 Apr 2022 12:00) (99% - 100%)                                             --------------------------------------------------------------  LABS:                        9.7    5.37  )-----------( 460      ( 15 Apr 2022 04:30 )             28.1     04-15    140  |  108  |  25<H>  ----------------------------<  99  4.9   |  21  |  1.3    Ca    8.9      15 Apr 2022 04:30  Mg     1.7     04-15    TPro  5.5<L>  /  Alb  4.0  /  TBili  1.0  /  DBili  x   /  AST  16  /  ALT  19  /  AlkPhos  148<H>  04-15    PT/INR - ( 15 Apr 2022 04:30 )   PT: 13.40 sec;   INR: 1.17 ratio         PTT - ( 15 Apr 2022 04:30 )  PTT:27.8 sec      Lactate, Blood: 1.8 mmol/L (04-15-22 @ 04:30)  Troponin T, Serum: <0.01 ng/mL (04-15-22 @ 04:30)          CARDIAC MARKERS ( 15 Apr 2022 04:30 )  x     / <0.01 ng/mL / x     / x     / x                                                  -------------------------------------------------------------  RADIOLOGY:                                            --------------------------------------------------------------    PHYSICAL EXAM:  General:   HEENT:  LUNGS:  HEART:  ABDOMEN:  EXT:  NEURO:  SKIN:                                           --------------------------------------------------------------    ASSESSMENT & PLAN    **INCOMPLETE** SIMONA KUHN 77y Female  MRN#: [id number]     - Hospital Day: 1d    - The patient is a 78 y/o female with PMHx of T2DM, MDS on lusparacept / retacrit presenting for anemia. Pt went to [facility] clinic today for lusparacept injections and on lab workup was found to have severe anemia with Hb 6.8 and associated weakness, fatigue and dizziness and hence was referred to the ED. Also endorses chronic diarrhea for past few months secondary to chemotherapy. Of note, patient is s/p 9 cycles of Vidaza since 1/2021 with chronic normocytic anemia while on chemo and since stopping and has required many transfusions. In the ED, vitals are WNL except for /78, Labs significant for Hb 5.8, lactate 3.70 > 5,20 and VBG shows Ph 7.26 w/ HCO 18. Pt received 2 units pRBC. GI was consulted for anemia. Patient reports anemia for years related to MDS and chemotherapy requiring many transfusions, at times almost every month. She denies melena, hematochezia, hemoptysis, epigastric pain, dysphagia. She does note chronic diarrhea for years, which has worsened over the last 6 months. She reports 5-10 non-bloody loose painless BM throughout the day with no associated abdominal pain. She visited Dr. Morales March 2022 for diarrhea and he recommended infectious workup, TTG, IgA, and colonoscopy.    - Colonoscopy/EGD:  - FHx: Denies FHx of CRC or esophageal cancer.  - Social: Denies tobacco and alcohol use.      REVIEW OF SYSTEMS:  CONSTITUTIONAL: No fevers or chills  EYES/ENT: No visual changes;  No throat pain   RESPIRATORY: No hemoptysis; No shortness of breath  CARDIOVASCULAR: No chest pain or palpitations  GASTROINTESTINAL: +diarrhea. No abdominal or epigastric pain. No nausea, vomiting, or hematemesis;  No melena or hematochezia.  GENITOURINARY: No hematuria    OBJECTIVE  PAST MEDICAL & SURGICAL HISTORY  DM (diabetes mellitus)    MDS (myelodysplastic syndrome)                                              -----------------------------------------------------------  ALLERGIES:  No Known Allergies                                            ------------------------------------------------------------    HOME MEDICATIONS  Home Medications:  glimepiride 2 mg oral tablet: 1 tab(s) orally 2 times a day (19 Nov 2021 04:21)  metFORMIN: 500 milligram(s) orally 2 times a day (27 May 2020 17:13)  Procrit: injectable every 7 days (19 Nov 2021 04:24)                           MEDICATIONS:  STANDING MEDICATIONS  chlorhexidine 4% Liquid 1 Application(s) Topical <User Schedule>  pantoprazole    Tablet 40 milliGRAM(s) Oral before breakfast    PRN MEDICATIONS                                            ------------------------------------------------------------  VITAL SIGNS: Last 24 Hours  T(C): 36.8 (15 Apr 2022 12:00), Max: 37.4 (14 Apr 2022 21:45)  T(F): 98.2 (15 Apr 2022 12:00), Max: 99.3 (14 Apr 2022 21:45)  HR: 79 (15 Apr 2022 12:00) (74 - 99)  BP: 146/68 (15 Apr 2022 12:00) (146/68 - 182/78)  BP(mean): 98 (15 Apr 2022 12:00) (98 - 106)  RR: 18 (15 Apr 2022 12:00) (18 - 19)  SpO2: 100% (15 Apr 2022 12:00) (99% - 100%)                                             --------------------------------------------------------------  LABS:                        9.7    5.37  )-----------( 460      ( 15 Apr 2022 04:30 )             28.1     04-15    140  |  108  |  25<H>  ----------------------------<  99  4.9   |  21  |  1.3    Ca    8.9      15 Apr 2022 04:30  Mg     1.7     04-15    TPro  5.5<L>  /  Alb  4.0  /  TBili  1.0  /  DBili  x   /  AST  16  /  ALT  19  /  AlkPhos  148<H>  04-15    PT/INR - ( 15 Apr 2022 04:30 )   PT: 13.40 sec;   INR: 1.17 ratio         PTT - ( 15 Apr 2022 04:30 )  PTT:27.8 sec      Lactate, Blood: 1.8 mmol/L (04-15-22 @ 04:30)  Troponin T, Serum: <0.01 ng/mL (04-15-22 @ 04:30)          CARDIAC MARKERS ( 15 Apr 2022 04:30 )  x     / <0.01 ng/mL / x     / x     / x                                                  -------------------------------------------------------------  RADIOLOGY:                                            --------------------------------------------------------------    PHYSICAL EXAM:  GENERAL: NAD, lying in bed comfortably  EYES:  conjunctiva and sclera clear  ENT: Moist mucous membranes  NECK: Supple, no JVD  HEART: Regular rate and rhythm  LUNGS: Unlabored respirations.  Clear to auscultation bilaterally  ABDOMEN: Soft, nontender, nondistended, +BS. Refused KAREN.  EXTREMITIES: No edema  NERVOUS SYSTEM:  A&Ox3    ASSESSMENT & PLAN  78 y/o female with PMHx of T2DM, MDS on lusparacept / retacrit presenting for anemia with Hb 6.8 and associated weakness, fatigue and dizziness. Also endorses chronic diarrhea for past few months secondary to chemotherapy. Patient s/p 9 cycles of Vidaza since 1/2021 with chronic normocytic anemia while on chemo and since stopping and has required many pRBC transfusions. In the ED, labs significant for Hb 5.8, lactate 3.70 > 5,20 and VBG shows Ph 7.26 w/ HCO 18. Pt received 2 units pRBC. GI was consulted for anemia. Patient denies melena, hematochezia, hemoptysis, epigastric pain, dysphagia. She notes chronic diarrhea for years, which has worsened over the last 6 months with 5-10 non-bloody loose painless BM throughout the day with no associated abdominal pain. She visited Dr. Morales March 2022 for diarrhea and he recommended infectious workup, TTG, IgA, and colonoscopy. Last colonoscopy/EGD:    #) Chronic Normocytic Anemia  - no overt GI bleeding; pt denies melena, hematochezia  - more likely 2/2 MDS  - s/p 2 unit pRBC with adequate response Hb 5.8 -> 9.7    Recs:  - ferritin and iron studies  - outpatient colonoscopy  - continue plan per heme/onc    #) Chronic Diarrhea  - non-bloody diarrhea x years, worsening for 6 months with 5-10 BM/day and associated unintentional 10 lb weight loss over 6 months  - more likely enteritis 2/2 prior chemo and current lusparacept, but further workup to r/o infectious, celiac, microscopic colitis  - hemodynamically stable and electrolytes wnl    Recs:  - f/u outpatient workup per Dr. Morales   - outpatient colonoscopy (patient reports she is working on scheduling soon)     SIMONA KUHN 77y Female  MRN#: [id number]     - Hospital Day: 1d    - The patient is a 78 y/o female with PMHx of T2DM, MDS on lusparacept / retacrit presenting for anemia. Pt went to [facility] clinic today for lusparacept injections and on lab workup was found to have severe anemia with Hb 6.8 and associated weakness, fatigue and dizziness and hence was referred to the ED. Also endorses chronic diarrhea for past few months secondary to chemotherapy. Of note, patient is s/p 9 cycles of Vidaza since 1/2021 with chronic normocytic anemia while on chemo and since stopping and has required many transfusions. In the ED, vitals are WNL except for /78, Labs significant for Hb 5.8, lactate 3.70 > 5,20 and VBG shows Ph 7.26 w/ HCO 18. Pt received 2 units pRBC. GI was consulted for anemia. Patient reports anemia for years related to MDS and chemotherapy requiring many transfusions, at times almost every month. She denies melena, hematochezia, hemoptysis, epigastric pain, dysphagia. She does note chronic diarrhea for years, which has worsened over the last 6 months. She reports 5-10 non-bloody loose painless BM throughout the day with no associated abdominal pain. She visited Dr. Morales March 2022 for diarrhea and he recommended infectious workup, TTG, IgA, and colonoscopy.    - Colonoscopy/EGD: Last colonoscopy 2017 - unremarkable. Patient denies past EGD.  - FHx: Denies FHx of CRC or esophageal cancer.  - Social: Denies tobacco and alcohol use.      REVIEW OF SYSTEMS:  CONSTITUTIONAL: No fevers or chills  EYES/ENT: No visual changes;  No throat pain   RESPIRATORY: No hemoptysis; No shortness of breath  CARDIOVASCULAR: No chest pain or palpitations  GASTROINTESTINAL: +diarrhea. No abdominal or epigastric pain. No nausea, vomiting, or hematemesis;  No melena or hematochezia.  GENITOURINARY: No hematuria    OBJECTIVE  PAST MEDICAL & SURGICAL HISTORY  DM (diabetes mellitus)    MDS (myelodysplastic syndrome)                                              -----------------------------------------------------------  ALLERGIES:  No Known Allergies                                            ------------------------------------------------------------    HOME MEDICATIONS  Home Medications:  glimepiride 2 mg oral tablet: 1 tab(s) orally 2 times a day (19 Nov 2021 04:21)  metFORMIN: 500 milligram(s) orally 2 times a day (27 May 2020 17:13)  Procrit: injectable every 7 days (19 Nov 2021 04:24)                           MEDICATIONS:  STANDING MEDICATIONS  chlorhexidine 4% Liquid 1 Application(s) Topical <User Schedule>  pantoprazole    Tablet 40 milliGRAM(s) Oral before breakfast    PRN MEDICATIONS                                            ------------------------------------------------------------  VITAL SIGNS: Last 24 Hours  T(C): 36.8 (15 Apr 2022 12:00), Max: 37.4 (14 Apr 2022 21:45)  T(F): 98.2 (15 Apr 2022 12:00), Max: 99.3 (14 Apr 2022 21:45)  HR: 79 (15 Apr 2022 12:00) (74 - 99)  BP: 146/68 (15 Apr 2022 12:00) (146/68 - 182/78)  BP(mean): 98 (15 Apr 2022 12:00) (98 - 106)  RR: 18 (15 Apr 2022 12:00) (18 - 19)  SpO2: 100% (15 Apr 2022 12:00) (99% - 100%)                                             --------------------------------------------------------------  LABS:                        9.7    5.37  )-----------( 460      ( 15 Apr 2022 04:30 )             28.1     04-15    140  |  108  |  25<H>  ----------------------------<  99  4.9   |  21  |  1.3    Ca    8.9      15 Apr 2022 04:30  Mg     1.7     04-15    TPro  5.5<L>  /  Alb  4.0  /  TBili  1.0  /  DBili  x   /  AST  16  /  ALT  19  /  AlkPhos  148<H>  04-15    PT/INR - ( 15 Apr 2022 04:30 )   PT: 13.40 sec;   INR: 1.17 ratio         PTT - ( 15 Apr 2022 04:30 )  PTT:27.8 sec      Lactate, Blood: 1.8 mmol/L (04-15-22 @ 04:30)  Troponin T, Serum: <0.01 ng/mL (04-15-22 @ 04:30)          CARDIAC MARKERS ( 15 Apr 2022 04:30 )  x     / <0.01 ng/mL / x     / x     / x                                                  -------------------------------------------------------------  RADIOLOGY:                                            --------------------------------------------------------------    PHYSICAL EXAM:  GENERAL: NAD, lying in bed comfortably  EYES:  conjunctiva and sclera clear  ENT: Moist mucous membranes  NECK: Supple, no JVD  HEART: Regular rate and rhythm  LUNGS: Unlabored respirations.  Clear to auscultation bilaterally  ABDOMEN: Soft, nontender, nondistended, +BS. Refused KAREN.  EXTREMITIES: No edema  NERVOUS SYSTEM:  A&Ox3    ASSESSMENT & PLAN  78 y/o female with PMHx of T2DM, MDS on lusparacept / retacrit presenting for anemia with Hb 6.8 and associated weakness, fatigue and dizziness. Also endorses chronic diarrhea for past few months secondary to chemotherapy. Patient s/p 9 cycles of Vidaza since 1/2021 with chronic normocytic anemia while on chemo and since stopping and has required many pRBC transfusions. In the ED, labs significant for Hb 5.8, lactate 3.70 > 5,20 and VBG shows Ph 7.26 w/ HCO 18. Pt received 2 units pRBC. GI was consulted for anemia. Patient denies melena, hematochezia, hemoptysis, epigastric pain, dysphagia. She notes chronic diarrhea for years, which has worsened over the last 6 months with 5-10 non-bloody loose painless BM throughout the day with no associated abdominal pain. She visited Dr. Morales March 2022 for diarrhea and he recommended infectious workup, TTG, IgA, and colonoscopy. Last colonoscopy 2017 was unremarkable.    #) Chronic Normocytic Anemia  - no overt GI bleeding; pt denies melena, hematochezia  - more likely 2/2 MDS vs GI bleed given hx normal colonoscopy  - s/p 2 unit pRBC with adequate response Hb 5.8 -> 9.7    Recs:  - ferritin and iron studies  - outpatient colonoscopy  - continue plan per heme/onc    #) Chronic Diarrhea  - non-bloody diarrhea x years, increased for 6 months with 5-10 BM/day and associated unintentional 10 lb weight loss over 6 months  - more likely enteritis 2/2 prior chemo and current lusparacept, but further workup to r/o infectious, celiac, microscopic colitis  - hemodynamically stable and electrolytes wnl    Recs:  - f/u outpatient workup per Dr. Morales   - outpatient colonoscopy (patient reports she is working on scheduling soon) SIMONA KUHN 77y Female  MRN#: [id number]     - Hospital Day: 1d    - The patient is a 78 y/o female with PMHx of T2DM, MDS on lusparacept / retacrit presenting for anemia. Pt went to [facility] clinic today for lusparacept injections and on lab workup was found to have severe anemia with Hb 6.8 and associated weakness, fatigue and dizziness and hence was referred to the ED. Also endorses chronic diarrhea for past few months secondary to chemotherapy. Of note, patient is s/p 9 cycles of Vidaza since 1/2021 with chronic normocytic anemia while on chemo and since stopping and has required many transfusions. In the ED, vitals are WNL except for /78, Labs significant for Hb 5.8, lactate 3.70 > 5,20 and VBG shows Ph 7.26 w/ HCO 18. Pt received 2 units pRBC. GI was consulted for anemia. Patient reports anemia for years related to MDS and chemotherapy requiring many transfusions, at times almost every month. She denies melena, hematochezia, hemoptysis, epigastric pain, dysphagia. She does note chronic diarrhea for years, which has worsened over the last 6 months. She reports 5-10 non-bloody loose painless BM throughout the day with no associated abdominal pain. She visited Dr. Morales March 2022 for diarrhea and he recommended infectious workup, TTG, IgA, and colonoscopy.    - Colonoscopy/EGD: Last colonoscopy 2017 - unremarkable. Patient denies past EGD.  - FHx: Denies FHx of CRC or esophageal cancer.  - Social: Denies tobacco and alcohol use.      REVIEW OF SYSTEMS:  CONSTITUTIONAL: No fevers or chills  EYES/ENT: No visual changes;  No throat pain   RESPIRATORY: No hemoptysis; No shortness of breath  CARDIOVASCULAR: No chest pain or palpitations  GASTROINTESTINAL: +diarrhea. No abdominal or epigastric pain. No nausea, vomiting, or hematemesis;  No melena or hematochezia.  GENITOURINARY: No hematuria    OBJECTIVE  PAST MEDICAL & SURGICAL HISTORY  DM (diabetes mellitus)    MDS (myelodysplastic syndrome)                                              -----------------------------------------------------------  ALLERGIES:  No Known Allergies                                            ------------------------------------------------------------    HOME MEDICATIONS  Home Medications:  glimepiride 2 mg oral tablet: 1 tab(s) orally 2 times a day (19 Nov 2021 04:21)  metFORMIN: 500 milligram(s) orally 2 times a day (27 May 2020 17:13)  Procrit: injectable every 7 days (19 Nov 2021 04:24)                           MEDICATIONS:  STANDING MEDICATIONS  chlorhexidine 4% Liquid 1 Application(s) Topical <User Schedule>  pantoprazole    Tablet 40 milliGRAM(s) Oral before breakfast    PRN MEDICATIONS                                            ------------------------------------------------------------  VITAL SIGNS: Last 24 Hours  T(C): 36.8 (15 Apr 2022 12:00), Max: 37.4 (14 Apr 2022 21:45)  T(F): 98.2 (15 Apr 2022 12:00), Max: 99.3 (14 Apr 2022 21:45)  HR: 79 (15 Apr 2022 12:00) (74 - 99)  BP: 146/68 (15 Apr 2022 12:00) (146/68 - 182/78)  BP(mean): 98 (15 Apr 2022 12:00) (98 - 106)  RR: 18 (15 Apr 2022 12:00) (18 - 19)  SpO2: 100% (15 Apr 2022 12:00) (99% - 100%)                                             --------------------------------------------------------------  LABS:                        9.7    5.37  )-----------( 460      ( 15 Apr 2022 04:30 )             28.1     04-15    140  |  108  |  25<H>  ----------------------------<  99  4.9   |  21  |  1.3    Ca    8.9      15 Apr 2022 04:30  Mg     1.7     04-15    TPro  5.5<L>  /  Alb  4.0  /  TBili  1.0  /  DBili  x   /  AST  16  /  ALT  19  /  AlkPhos  148<H>  04-15    PT/INR - ( 15 Apr 2022 04:30 )   PT: 13.40 sec;   INR: 1.17 ratio         PTT - ( 15 Apr 2022 04:30 )  PTT:27.8 sec      Lactate, Blood: 1.8 mmol/L (04-15-22 @ 04:30)  Troponin T, Serum: <0.01 ng/mL (04-15-22 @ 04:30)          CARDIAC MARKERS ( 15 Apr 2022 04:30 )  x     / <0.01 ng/mL / x     / x     / x                                                  -------------------------------------------------------------  RADIOLOGY:                                            --------------------------------------------------------------    PHYSICAL EXAM:  GENERAL: NAD, lying in bed comfortably  EYES:  conjunctiva and sclera clear  ENT: Moist mucous membranes  NECK: Supple, no JVD  HEART: Regular rate and rhythm  LUNGS: Unlabored respirations.  Clear to auscultation bilaterally  ABDOMEN: Soft, nontender, nondistended, +BS. Refused KAREN.  EXTREMITIES: No edema  NERVOUS SYSTEM:  A&Ox3    ASSESSMENT & PLAN  78 y/o female with PMHx of T2DM, MDS on lusparacept / retacrit presenting for anemia with Hb 6.8 and associated weakness, fatigue and dizziness. Also endorses chronic diarrhea for past few months secondary to chemotherapy. Patient s/p 9 cycles of Vidaza since 1/2021 with chronic normocytic anemia while on chemo and since stopping and has required many pRBC transfusions. In the ED, labs significant for Hb 5.8, lactate 3.70 > 5,20 and VBG shows Ph 7.26 w/ HCO 18. Pt received 2 units pRBC. GI was consulted for anemia. Patient denies melena, hematochezia, hemoptysis, epigastric pain, dysphagia. She notes chronic diarrhea for years, which has worsened over the last 6 months with 5-10 non-bloody loose painless BM throughout the day with no associated abdominal pain. She visited Dr. Morales March 2022 for diarrhea and he recommended infectious workup, TTG, IgA, and colonoscopy. Last colonoscopy 2017 was unremarkable.    #) Chronic Normocytic Anemia  - no overt GI bleeding; pt denies melena, hematochezia  - more likely 2/2 MDS vs GI bleed given hx normal colonoscopy  - s/p 2 unit pRBC with adequate response Hb 5.8 -> 9.7    Recs:  - ferritin and iron studies  - outpatient colonoscopy  - continue plan per heme/onc    #) Chronic Diarrhea  - non-bloody diarrhea x years, increased for 6 months with 5-10 BM/day and associated unintentional 10 lb weight loss over 6 months  - likely component of enteritis 2/2 prior chemo and current lusparacept, but further workup needed to r/o infectious, celiac, microscopic colitis  - hemodynamically stable and electrolytes wnl    Recs:  - f/u outpatient workup (infectious workup, TTG, IgA) per Dr. Morales   - outpatient colonoscopy (patient reports she is working on scheduling soon) SIMONA KUHN 77y Female  MRN#: [id number]     - Hospital Day: 1d    - The patient is a 76 y/o female with PMHx of T2DM, MDS on lusparacept / retacrit presenting for anemia. Pt went to [facility] clinic today for lusparacept injections and on lab workup was found to have severe anemia with Hb 6.8 and associated weakness, fatigue and dizziness and hence was referred to the ED. Also endorses chronic diarrhea for past few months secondary to chemotherapy. Of note, patient is s/p 9 cycles of Vidaza since 1/2021 with chronic normocytic anemia while on chemo and since stopping and has required many transfusions. In the ED, vitals are WNL except for /78, Labs significant for Hb 5.8, lactate 3.70 > 5,20 and VBG shows Ph 7.26 w/ HCO 18. Pt received 2 units pRBC. GI was consulted for anemia. Patient reports anemia for years related to MDS and chemotherapy requiring many transfusions, at times almost every month. She denies melena, hematochezia, hemoptysis, epigastric pain, dysphagia. She does note chronic diarrhea for years, which has worsened over the last 6 months. She reports 5-10 non-bloody loose painless BM throughout the day with no associated abdominal pain. She visited Dr. Morales March 2022 for diarrhea and he recommended infectious workup, TTG, IgA, and colonoscopy.    - Colonoscopy/EGD: Last colonoscopy 2017 - unremarkable. Patient denies past EGD.  - FHx: Denies FHx of CRC or esophageal cancer.  - Social: Denies tobacco and alcohol use.      REVIEW OF SYSTEMS:  CONSTITUTIONAL: No fevers or chills  EYES/ENT: No visual changes;  No throat pain   RESPIRATORY: No hemoptysis; No shortness of breath  CARDIOVASCULAR: No chest pain or palpitations  GASTROINTESTINAL: +diarrhea. No abdominal or epigastric pain. No nausea, vomiting, or hematemesis;  No melena or hematochezia.  GENITOURINARY: No hematuria    OBJECTIVE  PAST MEDICAL & SURGICAL HISTORY  DM (diabetes mellitus)    MDS (myelodysplastic syndrome)                                              -----------------------------------------------------------  ALLERGIES:  No Known Allergies                                            ------------------------------------------------------------    HOME MEDICATIONS  Home Medications:  glimepiride 2 mg oral tablet: 1 tab(s) orally 2 times a day (19 Nov 2021 04:21)  metFORMIN: 500 milligram(s) orally 2 times a day (27 May 2020 17:13)  Procrit: injectable every 7 days (19 Nov 2021 04:24)                           MEDICATIONS:  STANDING MEDICATIONS  chlorhexidine 4% Liquid 1 Application(s) Topical <User Schedule>  pantoprazole    Tablet 40 milliGRAM(s) Oral before breakfast    PRN MEDICATIONS                                            ------------------------------------------------------------  VITAL SIGNS: Last 24 Hours  T(C): 36.8 (15 Apr 2022 12:00), Max: 37.4 (14 Apr 2022 21:45)  T(F): 98.2 (15 Apr 2022 12:00), Max: 99.3 (14 Apr 2022 21:45)  HR: 79 (15 Apr 2022 12:00) (74 - 99)  BP: 146/68 (15 Apr 2022 12:00) (146/68 - 182/78)  BP(mean): 98 (15 Apr 2022 12:00) (98 - 106)  RR: 18 (15 Apr 2022 12:00) (18 - 19)  SpO2: 100% (15 Apr 2022 12:00) (99% - 100%)                                             --------------------------------------------------------------  LABS:                        9.7    5.37  )-----------( 460      ( 15 Apr 2022 04:30 )             28.1     04-15    140  |  108  |  25<H>  ----------------------------<  99  4.9   |  21  |  1.3    Ca    8.9      15 Apr 2022 04:30  Mg     1.7     04-15    TPro  5.5<L>  /  Alb  4.0  /  TBili  1.0  /  DBili  x   /  AST  16  /  ALT  19  /  AlkPhos  148<H>  04-15    PT/INR - ( 15 Apr 2022 04:30 )   PT: 13.40 sec;   INR: 1.17 ratio         PTT - ( 15 Apr 2022 04:30 )  PTT:27.8 sec      Lactate, Blood: 1.8 mmol/L (04-15-22 @ 04:30)  Troponin T, Serum: <0.01 ng/mL (04-15-22 @ 04:30)          CARDIAC MARKERS ( 15 Apr 2022 04:30 )  x     / <0.01 ng/mL / x     / x     / x                                                  -------------------------------------------------------------  RADIOLOGY:                                            --------------------------------------------------------------    PHYSICAL EXAM:  GENERAL: NAD, lying in bed comfortably  EYES:  conjunctiva and sclera clear  ENT: Moist mucous membranes  NECK: Supple, no JVD  HEART: Regular rate and rhythm  LUNGS: Unlabored respirations.  Clear to auscultation bilaterally  ABDOMEN: Soft, nontender, nondistended, +BS. Refused KAREN.  EXTREMITIES: No edema  NERVOUS SYSTEM:  A&Ox3

## 2022-04-15 NOTE — DISCHARGE NOTE PROVIDER - NSDCFUSCHEDAPPT_GEN_ALL_CORE_FT
SIMONA KUHN ; 04/21/2022 ; NP [facility] [address] [place] ; 04/21/2022 ; [facility] [address] [address] ; 04/28/2022 ; [facility] [facility] ; 05/05/2022 ; [facility] [address] [facility]

## 2022-04-15 NOTE — H&P ADULT - NSHPLABSRESULTS_GEN_ALL_CORE
(04-14 @ 16:15)                      5.8  5.25 )-----------( 524                 17.2    Neutrophils = 3.78 (72.0%)  Lymphocytes = 1.16 (22.0%)  Eosinophils = 0.00 (0.0%)  Basophils = 0.09 (1.7%)  Monocytes = 0.18 (3.4%)  Bands = --%    04-14    134<L>  |  103  |  26<H>  ----------------------------<  339<H>  4.6   |  17  |  1.4    Ca    8.6      14 Apr 2022 21:10    TPro  4.9<L>  /  Alb  3.5  /  TBili  0.9  /  DBili  x   /  AST  15  /  ALT  17  /  AlkPhos  141<H>  04-14          RVP:    Venous Blood Gas:  04-14 @ 21:21  7.26/39/29/18/54.0  VBG Lactate: 5.20  Venous Blood Gas:  04-14 @ 18:36  7.28/36/36/17/55.3  VBG Lactate: 3.70      < from: Xray Chest 1 View AP/PA (03.03.22 @ 18:51) >      IMPRESSION:    No radiographic evidence of acute cardiopulmonary disease.    --- End of Report ---    < end of copied text >

## 2022-04-15 NOTE — DISCHARGE NOTE NURSING/CASE MANAGEMENT/SOCIAL WORK - PATIENT PORTAL LINK FT
You can access the FollowMyHealth Patient Portal offered by [facility] by registering at the following website: [email or web address]. By joining [employer]’s FollowMyHealth portal, you will also be able to view your health information using other applications (apps) compatible with our system.

## 2022-04-15 NOTE — H&P ADULT - ASSESSMENT
77 years old female with PMHx of T2DM, MDS on lusparacept / retacrit comes in for evaluation of anemia.    Impression:  Acute on chronic normocytic anemia  HAGMA w/ NAGMA - lactic acidosis in setting of anemia and chronic diarrhea   HO MDS  HO T2DM  Elevated BP    # Acute on chronic normocytic anemia  - s/p 2 units PRBC  - check labs:  b12 , folate, iron, ferritin, tibc, ldh,   - trend cbc, keep active type and screen and transfuse if if hb < 7  - Protonix PO qd  - stool guaiac  - keep 2 large bore IV lines   - GI consult Dr Morales for EGD and colonoscopy   - heme/onc c/s    # HAGMA w/ NAGMA - lactic acidosis in setting of anemia and chronic diarrhea   # CKD  - trend lactate  - IVF NS @ 100cc  - f/u BMP   - reports stopped taking metformin 2 months ago.   - nephrology consult to establish care    # HO MDS - f/u heme/onc  # HO T2DM - --  hold oral meds;  check fs;  start and adjust insulin s/s prn   # Elevated BP - no hx of HTN, will monitor for now    # Diet: DASH/TLC, low carb  # DVT Prophylaxis: SCD  # GI Prophylaxis: PPI  # Activity: IAT  # IV Fluids: NS @ 100  # Dispo: Acute  # Code Status: Fullcode    * Please confrim med rec from pharmacy in am.

## 2022-04-15 NOTE — DISCHARGE NOTE PROVIDER - NSDCCPCAREPLAN_GEN_ALL_CORE_FT
PRINCIPAL DISCHARGE DIAGNOSIS  Diagnosis: Anemia  Assessment and Plan of Treatment: You were admitted to the hospital because of acute anemia. You received 2 units of blood in the hospital. The hemoglobin improved to 9.7. Follow up with your hemato-oncologist to continue treatement of MDS.   Anemia  Anemia is a condition in which the concentration of red blood cells or hemoglobin in the blood is below normal. Hemoglobin is a substance in red blood cells that carries oxygen to the tissues of the body. Anemia results in not enough oxygen reaching these tissues which can cause symptoms such as weakness, dizziness/lightheadedness, shortness of breath, chest pain, paleness, or nausea. The cause of your anemia may or may not be determined immediately. If your hemoglobin was dangerously low, you may have received a blood transfusion. Usually reactions to transfusions occur immediately but monitor yourself for any fevers, rash, or shortness of breath.  SEEK IMMEDIATE MEDICAL CARE IF YOU HAVE ANY OF THE FOLLOWING SYMPTOMS: extreme weakness/chest pain/shortness of breath, black or bloody stools, vomiting blood, fainting, fever, or any signs of dehydration.        SECONDARY DISCHARGE DIAGNOSES  Diagnosis: Lactic acidosis  Assessment and Plan of Treatment: You had excess build up of acid in the blood, which could happen because of dehydration/anemia. It  improved after giving the transfusion in the hospital. Follow up with your primary care physician.    Diagnosis: MDS (myelodysplastic syndrome)  Assessment and Plan of Treatment: follow up with your hemato-oncologist.     PRINCIPAL DISCHARGE DIAGNOSIS  Diagnosis: Anemia  Assessment and Plan of Treatment: You were admitted to the hospital because of acute anemia. You received 2 units of blood in the hospital. The hemoglobin improved to 9.7. Follow up with your hemato-oncologist to continue treatement of MDS. Follow up with your gastroenterologist to get an endoscopy and colonoscopy to evaluate for any GI cause of bleeding/diarrhea.   Anemia  Anemia is a condition in which the concentration of red blood cells or hemoglobin in the blood is below normal. Hemoglobin is a substance in red blood cells that carries oxygen to the tissues of the body. Anemia results in not enough oxygen reaching these tissues which can cause symptoms such as weakness, dizziness/lightheadedness, shortness of breath, chest pain, paleness, or nausea. The cause of your anemia may or may not be determined immediately. If your hemoglobin was dangerously low, you may have received a blood transfusion. Usually reactions to transfusions occur immediately but monitor yourself for any fevers, rash, or shortness of breath.  SEEK IMMEDIATE MEDICAL CARE IF YOU HAVE ANY OF THE FOLLOWING SYMPTOMS: extreme weakness/chest pain/shortness of breath, black or bloody stools, vomiting blood, fainting, fever, or any signs of dehydration.        SECONDARY DISCHARGE DIAGNOSES  Diagnosis: Lactic acidosis  Assessment and Plan of Treatment: You had excess build up of acid in the blood, which could happen because of dehydration/anemia. It  improved after giving the transfusion in the hospital. Follow up with your primary care physician.    Diagnosis: MDS (myelodysplastic syndrome)  Assessment and Plan of Treatment: follow up with your hemato-oncologist.    Diagnosis: Chronic diarrhea  Assessment and Plan of Treatment: Likely realted to chemotherapy. Follow up with your gastroenterologist for further workup.     PRINCIPAL DISCHARGE DIAGNOSIS  Diagnosis: Anemia  Assessment and Plan of Treatment: You were admitted to the hospital because of acute anemia. You received 2 units of blood in the hospital. The hemoglobin improved to 9.7. Follow up with your hemato-oncologist to continue treatement of MDS. Follow up with your gastroenterologist to get an endoscopy and colonoscopy to evaluate for any GI cause of bleeding/diarrhea.   Anemia  Anemia is a condition in which the concentration of red blood cells or hemoglobin in the blood is below normal. Hemoglobin is a substance in red blood cells that carries oxygen to the tissues of the body. Anemia results in not enough oxygen reaching these tissues which can cause symptoms such as weakness, dizziness/lightheadedness, shortness of breath, chest pain, paleness, or nausea. The cause of your anemia may or may not be determined immediately. If your hemoglobin was dangerously low, you may have received a blood transfusion. Usually reactions to transfusions occur immediately but monitor yourself for any fevers, rash, or shortness of breath.  SEEK IMMEDIATE MEDICAL CARE IF YOU HAVE ANY OF THE FOLLOWING SYMPTOMS: extreme weakness/chest pain/shortness of breath, black or bloody stools, vomiting blood, fainting, fever, or any signs of dehydration.        SECONDARY DISCHARGE DIAGNOSES  Diagnosis: Lactic acidosis  Assessment and Plan of Treatment: You had excess build up of acid in the blood, which could happen because of dehydration/anemia. It  improved after giving the transfusion in the hospital. Follow up with your primary care physician.    Diagnosis: MDS (myelodysplastic syndrome)  Assessment and Plan of Treatment: follow up with your hemato-oncologist.    Diagnosis: Chronic diarrhea  Assessment and Plan of Treatment: Likely realted to chemotherapy. Follow up with your gastroenterologist for further workup.    Diagnosis: Diabetes mellitus  Assessment and Plan of Treatment: Diabetes is a chronic condition caused by high blood sugar levels. Your blood sugar levels become high because your body does not have enough insulin. Insulin helps move sugar out of the blood so it can be used for energy. Increased sugar in your blood can cause problems in several organs of your body including but not limited to your blood vessels, your kidneys, your brain, and your eyes. The lack of insulin forces your body to use fat instead of sugar for energy. This can be dangerous if not controlled.  Seek Medical Attention If:  You have a seizure.  You begin to breathe fast, or are short of breath.  You become weak and confused.  You are more drowsy than usual.  You have fruity, sweet breath.  You have severe, new stomach pain and are vomiting.  Your blood sugar level is lower or higher than your healthcare provider says it should be.  You have ketones in your blood or urine.  You have a fever or chills.  You are more thirsty than usual.  You are urinating more often than usual.  You have questions or concerns about your condition or care.  Insulin and diabetes medicine decreases the amount of sugar in your blood.  The best way to prevent problems from Diabetes is to control your blood sugars.  Monitor your blood sugar levels closely. Administer Insulin as directed by your physician.   Speak with your doctor and/or a nutritionist about the best way to change your lifestyle and dietary habits to avoid any problems from Diabetes in the future.       PRINCIPAL DISCHARGE DIAGNOSIS  Diagnosis: Anemia  Assessment and Plan of Treatment: You were admitted to the hospital because of acute anemia. You received 2 units of blood in the hospital. The hemoglobin improved to 9.7. Follow up with your hemato-oncologist to continue treatement of MDS. Follow up with your gastroenterologist to get an endoscopy and colonoscopy to evaluate for any GI cause of bleeding/diarrhea.   Anemia  Anemia is a condition in which the concentration of red blood cells or hemoglobin in the blood is below normal. Hemoglobin is a substance in red blood cells that carries oxygen to the tissues of the body. Anemia results in not enough oxygen reaching these tissues which can cause symptoms such as weakness, dizziness/lightheadedness, shortness of breath, chest pain, paleness, or nausea. The cause of your anemia may or may not be determined immediately. If your hemoglobin was dangerously low, you may have received a blood transfusion. Usually reactions to transfusions occur immediately but monitor yourself for any fevers, rash, or shortness of breath.  SEEK IMMEDIATE MEDICAL CARE IF YOU HAVE ANY OF THE FOLLOWING SYMPTOMS: extreme weakness/chest pain/shortness of breath, black or bloody stools, vomiting blood, fainting, fever, or any signs of dehydration.        SECONDARY DISCHARGE DIAGNOSES  Diagnosis: Lactic acidosis  Assessment and Plan of Treatment: You had excess build up of acid in the blood, which could happen because of dehydration/anemia. It  improved after giving the transfusion in the hospital. Follow up with your primary care physician.    Diagnosis: MDS (myelodysplastic syndrome)  Assessment and Plan of Treatment: follow up with your hemato-oncologist.    Diagnosis: Chronic diarrhea  Assessment and Plan of Treatment: Likely realted to chemotherapy. Follow up with your gastroenterologist for further workup.    Diagnosis: Diabetes mellitus  Assessment and Plan of Treatment: Diabetes is a chronic condition caused by high blood sugar levels. Your blood sugar levels become high because your body does not have enough insulin. Insulin helps move sugar out of the blood so it can be used for energy. Increased sugar in your blood can cause problems in several organs of your body including but not limited to your blood vessels, your kidneys, your brain, and your eyes. The lack of insulin forces your body to use fat instead of sugar for energy. This can be dangerous if not controlled.  Seek Medical Attention If:  You have a seizure.  You begin to breathe fast, or are short of breath.  You become weak and confused.  You are more drowsy than usual.  You have fruity, sweet breath.  You have severe, new stomach pain and are vomiting.  Your blood sugar level is lower or higher than your healthcare provider says it should be.  You have ketones in your blood or urine.  You have a fever or chills.  You are more thirsty than usual.  You are urinating more often than usual.  You have questions or concerns about your condition or care.  Insulin and diabetes medicine decreases the amount of sugar in your blood.  The best way to prevent problems from Diabetes is to control your blood sugars.  Monitor your blood sugar levels closely. Administer Insulin as directed by your physician.   Speak with your doctor and/or a nutritionist about the best way to change your lifestyle and dietary habits to avoid any problems from Diabetes in the future.      Diagnosis: Stage 3 chronic kidney disease  Assessment and Plan of Treatment: Call the number provided above to schedule appointment with a nephrologist.   Chronic kidney disease can (CKD) happen because of many things. These include infections, diabetes, high blood pressure, kidney stones, circulation problems, and reactions to medicine. It's important to remember that the main goal of treatment is to stop CKD from progressing to complete kidney failure. Treatments may vary based on the progression of CKD.  Call your healthcare provider right away if you have any of the following:  - Chest pain (call 911)  -Trouble eating or drinking  - Weight loss of more than  2 pounds in  24 hours or more than  5 pounds in  7 days  - Little or no urine output  - Trouble breathing  - Muscle aches  - Fever of  100.4°F ( 38°C) or higher, or as advised by your healthcare provider  - Blood in your urine or stool  - Bloody discharge from your nose, mouth, or ears  - Severe headache or a seizure  - Vomiting  - Swelling of legs or ankles

## 2022-04-15 NOTE — DISCHARGE NOTE PROVIDER - PROVIDER TOKENS
PROVIDER:[TOKEN:[[id number]],FOLLOWUP:[1 week]],PROVIDER:[TOKEN:[[id number]],FOLLOWUP:[1 week]] PROVIDER:[TOKEN:[[id number]],FOLLOWUP:[1 week]],PROVIDER:[TOKEN:[[id number]],FOLLOWUP:[1 week]],PROVIDER:[TOKEN:[[id number]],FOLLOWUP:[2 weeks]] PROVIDER:[TOKEN:[[id number]],FOLLOWUP:[1 week]],PROVIDER:[TOKEN:[[id number]],FOLLOWUP:[1 week]],PROVIDER:[TOKEN:[[id number]],FOLLOWUP:[2 weeks]],PROVIDER:[TOKEN:[[id number]],FOLLOWUP:[2 weeks]]

## 2022-04-18 LAB
ALBUMIN SERPL ELPH-MCNC: 4.1 G/DL
ALP BLD-CCNC: 157 U/L
ALT SERPL-CCNC: 19 U/L
ANION GAP SERPL CALC-SCNC: 15 MMOL/L
AST SERPL-CCNC: 18 U/L
BILIRUB SERPL-MCNC: 0.6 MG/DL
BUN SERPL-MCNC: 30 MG/DL
CALCIUM SERPL-MCNC: 8.6 MG/DL
CHLORIDE SERPL-SCNC: 100 MMOL/L
CO2 SERPL-SCNC: 16 MMOL/L
CREAT SERPL-MCNC: 1.5 MG/DL
EGFR: 36 ML/MIN/1.73M2
GLUCOSE SERPL-MCNC: 478 MG/DL
HCT VFR BLD CALC: 19.8 %
HGB BLD-MCNC: 6.8 G/DL
MCHC RBC-ENTMCNC: 31.2 PG
MCHC RBC-ENTMCNC: 34.3 G/DL
MCV RBC AUTO: 90.8 FL
PLATELET # BLD AUTO: 556 K/UL
PMV BLD: 10 FL
POTASSIUM SERPL-SCNC: 5.2 MMOL/L
PROT SERPL-MCNC: 5.9 G/DL
RBC # BLD: 2.18 M/UL
RBC # FLD: 18.7 %
SODIUM SERPL-SCNC: 131 MMOL/L
WBC # FLD AUTO: 7.92 K/UL

## 2022-04-20 NOTE — ED ADULT NURSE NOTE - NSSEPSISSUSPECTED_ED_A_ED
[facility]  Office Visit Progress Note    Derenda Scale  1946    April 20, 2022    CC: \"I've had 2 different episodes in 6 months. \"    HPI:  The patient is 77 y.o. male with a past medical history significant for hyperlipidemia and CAD. Presented with chest pain and hospitalized from 2/8/2018-2/10/2018 with NSTEMI. He had issues with bradycardia and hypotension transiently requiring dobutamine, as well as transient PAF (during cardiac cath). On 2/9/2018 he underwent LHC with resultant OPHELIA to LCX and noted to have high grade LAD stenosis. He was readmitted with GI bleed and anemia and underwent EGD which showed cratered ulcer and duodenitis. He was placed on carafate and Protonix and discharged. On 3/15/2018 he had repeat procedure resulting in OPHELIA x 2 to LAD. Today, he is here for management of atrial fibrillation and CAD. He notes 1 episode of chest discomfort > 6 months ago. Also notes palpitations, chest felt uncomfortable but no pain, SOB or any associated symptoms a few weeks ago. His BP monitor said irregular heart rhythm. Otherwise he has been doing and feeling well with no cardiac complaints. He is exercising 30 minutes daily on his stationary bike and lifting weights. He is playing golf regularly. Notes some generalized fatigue. Patient denies exertional chest pain/pressure, dyspnea at rest, DRAPER, PND, orthopnea, lightheadedness, weight changes, changes in LE edema, and syncope. He admits to medical therapy compliance and tolerating. He denies any abnormal bruising or bleeding. Review of Systems:  Constitutional: Denies  fatigue, weakness, night sweats or fever. HEENT: Denies new visual changes, ringing in ears, nosebleeds,nasal congestion  Respiratory: Denies new or change in SOB, PND, orthopnea or cough.    Cardiovascular: see HPI  GI: Denies N/V, diarrhea, constipation, abdominal pain, change in bowel habits, melena or hematochezia  : Denies urinary frequency, urgency, incontinence, hematuria or dysuria. Skin: Denies rash, hives, or cyanosis  Musculoskeletal: + chronic back pain/cervical neck pain has improved  Neurological: Denies syncope or TIA-like symptoms. Psychiatric: Denies anxiety, insomnia or depression     Past Medical History:   Diagnosis Date    Basal cell carcinoma of face 11/1/2014    right near angle of jaw    Chest pain 2009    worse with allergies? (esosinophilic esoph?)    Coronary artery disease involving native coronary artery of native heart without angina pectoris 2/9/2018    ED (erectile dysfunction) 10/14/2014    GERD (gastroesophageal reflux disease) 10/27 2012    Homocysteinemia 10/22/2014    14    Hypercholesterolemia 10/14/2014    New onset a-fib ([facility] [address] 2/9/2018    NSTEMI (non-ST elevated myocardial infarction) ([facility] [address] 2/9/2018    Plantar fasciitis of left foot 10/27/2013    Seasonal allergic rhinitis 10/27/1957    better since late 40's     Shingles 2008    back of left thigh    Vitamin D deficiency 10/22/2014    24     Past Surgical History:   Procedure Laterality Date    COLONOSCOPY  11/19/2014    colon polyp q 5 year.  COLONOSCOPY N/A 01/15/2020    Dr Underwood Enrrique. No polyps but hx of same. recheck 5 yrs. diverticular dz. small internal hemorrhoids.  CORONARY ANGIOPLASTY WITH STENT PLACEMENT  02/09/2018    CORONARY ANGIOPLASTY WITH STENT PLACEMENT Left 03/15/2018    LAD PCI    LIP REPAIR Left 2004-6    lower vein removal    MOHS SURGERY Right 12/1/2014    near angle of jaw in sideburn    TOOTH EXTRACTION      2x    UPPER GASTROINTESTINAL ENDOSCOPY N/A 02/27/2018    Mild inactive chronic gastritis.  ULCER?    VASECTOMY Bilateral early 19's     Family History   Problem Relation Age of Onset    Lung Cancer Mother 77        1 lung removed    Heart Failure Mother         from 1 lung removed    Osteoarthritis Mother         hands    Other Father         [place]'s    Cancer Father         skin    Other Sister         Russell's in ECF    Other Brother         Murray's    Heart Disease Brother         ?related to allergy meds?  Other Brother         GERD    High Blood Pressure Brother     Prostate Cancer Brother 77        s/p Prostatectomy 77    Other Brother         Russell's    Other Brother         Russell's in ECF    Alcohol Abuse Paternal Grandfather     No Known Problems Son     No Known Problems Son     Heart Surgery Maternal Aunt         young adult - not sure of type    Other Paternal Aunt         Murray's    Breast Cancer Paternal Aunt      Social History     Tobacco Use    Smoking status: Never Smoker    Smokeless tobacco: Never Used   Vaping Use    Vaping Use: Never used   Substance Use Topics    Alcohol use: Yes     Alcohol/week: 1.0 standard drink     Types: 1 Cans of beer per week     Comment: 1-2 beer 2-3x/wk.  0 SINCE mi, Was heavy in college 22-31 4-5 beers at a party every 3rd wkend. .  1 drink every 2-3 wks. 3/4/18 rare    Drug use: No     Comment: Never tried MJ. No Known Allergies  Current Outpatient Medications   Medication Sig Dispense Refill    atorvastatin (LIPITOR) 40 MG tablet TAKE 1 TABLET BY MOUTH EVERY DAY 90 tablet 0    metoprolol succinate (TOPROL XL) 25 MG extended release tablet TAKE 1/2 TABLET BY MOUTH ONE TIME A DAY 45 tablet 1    tamsulosin (FLOMAX) 0.4 MG capsule TAKE 1 CAPSULE BY MOUTH EVERY DAY 30 capsule 11    b complex vitamins capsule Take 1 capsule by mouth daily Super b complex      nitroGLYCERIN (NITROSTAT) 0.4 MG SL tablet up to max of 3 total doses.  If no relief after 1 dose, call 911. 100 tablet 0    Cholecalciferol (VITAMIN D) 50 MCG (2000 UT) CAPS capsule Take 2 capsules by mouth daily       Omega-3 Fatty Acids (FISH OIL OMEGA-3 PO) Take 540 mg by mouth daily       acetaminophen (TYLENOL) 325 MG tablet Take 2 tablets by mouth every 6 hours as needed for Pain or Fever 120 tablet 3    aspirin 81 MG EC tablet Take 1 tablet by mouth daily 30 tablet 3     No current facility-administered medications for this visit. Vitals:  /68   Pulse (!) 46   Ht 5' 11\" (1.803 m)   Wt 209 lb (94.8 kg)   SpO2 97%   BMI 29.15 kg/m²   Wt Readings from Last 2 Encounters:   04/20/22 209 lb (94.8 kg)   03/30/22 210 lb 6.4 oz (95.4 kg)     Physical Exam:   Constitutional: The patient is oriented to person, place, and time. Appears well-developed and well-nourished. In no acute distress. Head: Normocephalic and atraumatic. Pupils equal and round. Neck: Neck supple. No JVP or carotid bruit appreciated. No mass and no thyromegaly present. No lymphadenopathy present. Cardiovascular: Normal rate. Normal heart sounds. Exam reveals no gallop and no friction rub. No murmur heard. Pulmonary/Chest: Effort normal and breath sounds normal. No respiratory distress. No wheezes, rhonchi or rales. Abdominal: Soft, non-tender. Bowel sounds are normal. Exhibits no organomegaly, mass or bruit. Extremities: No edema. No cyanosis or clubbing. Pulses are 2+ radial and carotid bilaterally. Neurological: No gross cranial nerve deficit. Coordination normal.   Skin: Skin is warm and dry. There is no rash or diaphoresis. Psychiatric: Patient has a normal mood and affect.  Speech is normal and behavior is normal.       Lab Review:   Lab Results   Component Value Date    TRIG 68 05/10/2018    HDL 62 09/13/2021    LDLCALC 73 09/13/2021    LABVLDL 13 09/13/2021     Lab Results   Component Value Date     01/13/2021    K 4.2 01/13/2021    K 4.0 02/26/2018     01/13/2021    CO2 24 01/13/2021    BUN 19 01/13/2021    CREATININE 1.2 01/13/2021    GLUCOSE 100 01/13/2021    CALCIUM 9.8 01/13/2021      Lab Results   Component Value Date    WBC 7.8 10/29/2018    HGB 15.2 10/29/2018    HCT 46.0 10/29/2018    MCV 87.7 10/29/2018     10/29/2018       ECG   8/23/19: Marked sinus  Bradycardia  -First degree A-V block  47 bpm  2/27/20: Sinus bradycardia-First degree AV block  9/4/20: Sinus  Bradycardia  -First degree A-V block, Anna = 222, Left axis -anterior fascicular block. Poor R-wave progression -may be secondary to pulmonary disease   consider old anterior infarct. Low voltage with rightward P-axis and rotation -possible pulmonary disease. 9/22/21: Sinus  Bradycardia  -First degree A-V block  - occasional ectopic ventricular beat , low voltage in precordial leads. Incomplete right bundle branch block and left axis -anterior fascicular block. Old inferior infarct. ~53 bpm.   4/20/22:     Imaging:     ECHO 2/9/18  Left ventricle size is normal. Normal left ventricular wall thickness. Ejection fraction is visually estimated to be 55%. There is mild hypokinesis of the inferolateral and inferior walls. Diastolic function could not be  fully assessed due to arrhythmia. Patient appears to be in atrial fibrillation. Mild to moderate mitral regurgitation is present. The aortic valve is structurally normal.  Trivial aortic regurgitation is present. .  There is mild tricuspid regurgitation with RVSP estimated at 33 mmHg. Estimated right atrial pressure is 5 mmHg     Stress Test: 8/1/18  Abnormal myocardial perfusion study with fixed inferior wall defect    suggestive of prior MI    small area of reversible perfusion defect in the inferior apical wall    suggestive of ischemia    Normal LV size and systolic function.    Overall findings represent a intermediate risk study. Cardiac cath 2/9/18  1.  Patent left main trunk. 2.  A 99.9% stenosis of the long area of stenosis of the mid left anterior  descending artery with a ANYA grade 1 flow distally. 3.  A 95% to 99% stenosis of the proximal circumflex artery with evidence of a blood clot with ANYA grade 2 flow. 4.  Wall motion abnormality of the left ventricle with estimated EF of  approximately 50%.   5.  Successful angioplasty followed by drug-eluting stent deployment in the proximal circumflex artery, 99% stenosis reduced to 0% using a 3.0 x 15 [place] drug-eluting stent.  Final diameter is 3.40. 6.  In the presence of multiple findings, we will leave the patient on high  dose statin, aspirin    3/15/2018: Selective angiogram with PCI:  OVERALL IMPRESSION:  1.  Patent stented proximal circumflex artery which is a dominant artery. 2.  Patent left main trunk. 3.  A 99.9% long area of stenosis of mid left anterior descending artery. 4.  Successful angioplasty followed by two drug-eluting stent deployment in the left anterior descending artery.  Final diameter of 3.25 in the  proximal and 2.75 in the distal segment.  Stents used were 2.5 x 38 with a final diameter of 2.75 and second stent used was 3.0 x 26 with a final  diameter of 3.25. Cardiac Cath 8/9/18  1. Normal left heart hemodynamics except for low EDP leading to needing IV  hydration. 2.  Patent left main trunk. 3.  50% ostial to proximal left anterior descending artery stenosis. 50%  to 60% stenosis of the distal LAD. Patent stented midportion of the left  anterior descending artery with no evidence of in/-stent restenosis. 4.  Patent stented proximal circumflex artery which is dominant. 5.  Patent nondominant right coronary artery. 6.  Low normal left ventricular systolic function with wall motion  abnormalities as described. Estimated EF is approximately 50%. 7.  Patent ascending aorta with no evidence of aortic dissection, trivial aortic regurgitation. Stress study 3/11/20  Summary     large size moderate severity fixed inferior/inferior lateral wall defect,     likely consistent with prior MI. No evidence of ischemia.          Recommendation     Aggressive medical therapy for CAD        Assessment/Plan:    Ischemic heart disease/CAD with multivessel disease   -3/15/2018 successful angioplasty followed by two drug-eluting stent deployment in the left anterior descending artery  --stress study 3/11/20 with no ischemic changes. -previously discontinued Imdur.    -he denies any angina like symptoms today except few episodes of chest discomfort > 6 months ago.   -he had an isolated episode of palpitations with chest discomfort but no other associated symptoms a few weeks ago.   -he is exercising 30 minutes daily on his stationary bike and lifting weights. Playing golf regularly.   -And has noted fatigue lately    -continue Aspirin, statin, BB  -BMP 1/13/21 wnl except GFR-JESSICA 59, liver/lipid 9/2021 wnl.   -risk factor modification again discussed   -reviewed all cardiac imaging to date.   -will proceed with treadmill perfusion myoview to assess for progressive CAD since he has experienced chest discomfort, and exertional fatigue which are new compared to the last visit raising the possibility of progressive atherosclerotic heart disease  . Bradycardia  -no reported s/s of bradycardia except generalized fatigue and has remained stable since our last visit. -physical exam normal today.   -continue low dose BB (Toprol XL 12.5 mg daily)  -EKG today Marked sinus  Bradycardia, Low voltage in precordial leads, Incomplete right bundle branch block and left axis -anterior fascicular block. Old inferior infarct. ~ 46 bpm.   -will continue to monitor      Paroxysmal atrial fibrillation   -not on long term anticoagulation (transient episode in hospital in 2/2018) and without recurrence  -continue BB (low dose)  -isolated palpitation episode with chest discomfort, no associated symptoms a few weeks ago. -EKG SB.   -will monitor     Hyperlipidemia, unspecified   -Last lipid profile from 9/13/21 wnl with LDLc 73, HDL 62. LFT wnl. LDL goal <70.  -Continue Lipitor 40 mg daily. -no reported myalgias. Chronic back and neck issues    Dr. Abimael Javier completes yearly blood work. Instructed to obtain flu vaccine this season, annually and he has obtained the  COVID-19 vaccine.      We will call with test results and plan to see him back in follow up in 6-7 months. Thank you very much for allowing me to participate in the care of your patient. Please do not hesitate to contact me if you have any questions. Sincerely,  Fred Sandhu MD      [place] [address]  Ph: [phone]  Fax: [phone]    This note was scribed in the presence of Dr. Marsha Gustafson MD by Armin Evans RN. Yes

## 2022-04-21 ENCOUNTER — LABORATORY RESULT (OUTPATIENT)
Age: 77
End: 2022-04-21

## 2022-04-21 ENCOUNTER — APPOINTMENT (OUTPATIENT)
Dept: HEMATOLOGY ONCOLOGY | Facility: CLINIC | Age: 77
End: 2022-04-21
Payer: MEDICARE

## 2022-04-21 ENCOUNTER — APPOINTMENT (OUTPATIENT)
Dept: INFUSION THERAPY | Facility: CLINIC | Age: 77
End: 2022-04-21
Payer: MEDICARE

## 2022-04-21 DIAGNOSIS — E87.2 ACIDOSIS: ICD-10-CM
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
## 2022-04-21 DIAGNOSIS — D64.9 ANEMIA, UNSPECIFIED: ICD-10-CM

## 2022-04-21 DIAGNOSIS — E87.1 HYPO-OSMOLALITY AND HYPONATREMIA: ICD-10-CM

## 2022-04-21 DIAGNOSIS — E11.65 TYPE 2 DIABETES MELLITUS WITH HYPERGLYCEMIA: ICD-10-CM

## 2022-04-21 DIAGNOSIS — Z79.84 LONG TERM (CURRENT) USE OF ORAL HYPOGLYCEMIC DRUGS: ICD-10-CM

## 2022-04-21 DIAGNOSIS — R03.0 ELEVATED BLOOD-PRESSURE READING, WITHOUT DIAGNOSIS OF HYPERTENSION: ICD-10-CM

## 2022-04-21 DIAGNOSIS — E11.22 TYPE 2 DIABETES MELLITUS WITH DIABETIC CHRONIC KIDNEY DISEASE: ICD-10-CM

## 2022-04-21 DIAGNOSIS — N18.30 CHRONIC KIDNEY DISEASE, STAGE 3 UNSPECIFIED: ICD-10-CM

## 2022-04-21 DIAGNOSIS — K52.9 NONINFECTIVE GASTROENTERITIS AND COLITIS, UNSPECIFIED: ICD-10-CM

## 2022-04-21 DIAGNOSIS — T45.1X5A ADVERSE EFFECT OF ANTINEOPLASTIC AND IMMUNOSUPPRESSIVE DRUGS, INITIAL ENCOUNTER: ICD-10-CM

## 2022-04-21 DIAGNOSIS — D46.9 MYELODYSPLASTIC SYNDROME, UNSPECIFIED: ICD-10-CM

## 2022-04-21 LAB
HCT VFR BLD CALC: 30.7 %
HGB BLD-MCNC: 10.3 G/DL
MCHC RBC-ENTMCNC: 31.1 PG
MCHC RBC-ENTMCNC: 33.6 G/DL
MCV RBC AUTO: 92.7 FL
PLATELET # BLD AUTO: 456 K/UL
PMV BLD: 10.2 FL
RBC # BLD: 3.31 M/UL
RBC # FLD: 16.7 %
WBC # FLD AUTO: 6.26 K/UL

## 2022-04-21 PROCEDURE — 99215 OFFICE O/P EST HI 40 MIN: CPT

## 2022-04-21 RX ORDER — ERYTHROPOIETIN 10000 [IU]/ML
80000 INJECTION, SOLUTION INTRAVENOUS; SUBCUTANEOUS ONCE
Refills: 0 | Status: COMPLETED | OUTPATIENT
Start: 2022-04-21 | End: 2022-04-21

## 2022-04-21 RX ORDER — LUSPATERCEPT 75 MG/1
110 INJECTION, POWDER, LYOPHILIZED, FOR SOLUTION SUBCUTANEOUS ONCE
Refills: 0 | Status: COMPLETED | OUTPATIENT
Start: 2022-04-21 | End: 2022-04-21

## 2022-04-21 RX ADMIN — ERYTHROPOIETIN 80000 UNIT(S): 10000 INJECTION, SOLUTION INTRAVENOUS; SUBCUTANEOUS at 13:40

## 2022-04-21 RX ADMIN — LUSPATERCEPT 110 MILLIGRAM(S): 75 INJECTION, POWDER, LYOPHILIZED, FOR SOLUTION SUBCUTANEOUS at 13:40

## 2022-04-22 LAB
ALBUMIN SERPL ELPH-MCNC: 4.2 G/DL
ALP BLD-CCNC: 150 U/L
ALT SERPL-CCNC: 18 U/L
ANION GAP SERPL CALC-SCNC: 14 MMOL/L
AST SERPL-CCNC: 17 U/L
BILIRUB SERPL-MCNC: 0.9 MG/DL
BUN SERPL-MCNC: 26 MG/DL
CALCIUM SERPL-MCNC: 9.2 MG/DL
CHLORIDE SERPL-SCNC: 100 MMOL/L
CO2 SERPL-SCNC: 20 MMOL/L
CREAT SERPL-MCNC: 1.6 MG/DL
EGFR: 33 ML/MIN/1.73M2
GLUCOSE SERPL-MCNC: 456 MG/DL
POTASSIUM SERPL-SCNC: 5.6 MMOL/L
PROT SERPL-MCNC: 5.9 G/DL
SODIUM SERPL-SCNC: 134 MMOL/L

## 2022-04-23 NOTE — ASSESSMENT
[FreeTextEntry1] : Patient is a 77 year old female with # Lowrisk myelodysplastic syndrome, previously treated with Revlimid and Procrit and Vidaza . 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 \par Patient is s/p  9 cycles of Vidaza and she was requiring pRBC transfusion every 3 weeks \par Given the persistent transfusion dependence she was switched to Luspatercept.\par \par \par DETECTED GENOMIC ALTERATIONS:\par Tier III: Variants of Unknown Clinical Significance\par SF3B1 [id number]\par Hyperkalemia likely due to increased platelet; pseudohyperkalemia.\par \par PLAN:\par -- Continue Luspatercept 1.75 mg/kg every 3 weeks, \par --Continue Procrit 80,000 units every week.\par Side effects of Procrit discussed including but not limited to: hypertension, headache, pruritus, nausea, vomiting, injection site pain, arthralgia, cough, fever.\par -- Monitor CBC weekly and administer 1 PRBC unit  as needed to keep Hgb >7 gm/dL.  Pt has e/o iron overload so transfusions need to be kept at a minimum if possible\par \par \par \par # Vitamin  B 12 deficiency\par -- Continue Vitamin B 12 injection every month\par \par # Thrombocytosis\par -- S/P Hydrea\par -- Will continue to monitor.\par \par rslts of  BM biopsy d/w pt. No e/o increased blasts. Pt is needing PRBCs almost every 3 weeks. Advised to follow up with Dr Ferrell to consider any other option or clinical trial\par Labs reviewed, \par \par RTC in 3 weeks \par \par \par \par

## 2022-04-23 NOTE — PHYSICAL EXAM
[Restricted in physically strenuous activity but ambulatory and able to carry out work of a light or sedentary nature] : Status 1- Restricted in physically strenuous activity but ambulatory and able to carry out work of a light or sedentary nature, e.g., light house work, office work [Normal] : affect appropriate [de-identified] : pallor

## 2022-04-23 NOTE — HISTORY OF PRESENT ILLNESS
[de-identified] : She missed on appointment for procrit last week.\par She starts her next cycle on 6/25/18\par C/o back pain radiating down her left which occurred when she bent to  something.\par No change in diarrhea.\par \par 7/31/18:\par Doing well. On Revlimid for more than 2yrs, 5 mg 2 weeks on 1 week off. She will be starting week on tonight. \par C/o diarrhea. On Imodium 2 pills AM and 2 pills PM. Doesn't help much with diarrhea. \par C/o nausea, abdominal pain. \par \par \par Colonoscopy in 2016 was normal. \par Did not have blood transfusion for over 2 years. \par On procrit [id number] weekly. Missed last week on 7/24/18 as she was out of town. She has been non compliant with weekly Procrit.\par Mammogram in 2017 was normal. \par \par 9/11/18\par pt is here for follow up.\par She feels the lomotil helps with the diarrhea.\par She was away for a few weeks and missed her procrit injections.\par No fever, abdominal  discomfort has been less since since use of lomotil.\par She started a new cycle 2 days ago.\par \par 10/2/18:\par She will start Revlimid tonight (week on). 2 weeks on, 1 week off. \par On ASA 81mg. \par Denies fever, nausea, vomiting, chest pain, SOB, abdominal pain, and bladder problems.\par C/o diarrhea. \par S/p 2 units PRBC transfusion on 9/25/18. \par On Procrit 59021 units weekly. Not compliant every week. \par C/o intermittent dizziness. \par \par 10/31/18\par Pt is here for follow up.\par C/O significant fatigue.\par Continues to have diarrhea, takes imodium and lomotil as needed.\par C/o dry cough, no fever.\par Cough started a month ago. It is worse in the mornings however over last week it has been constant all day.\par No chest pain.\par She was found to have low B12 levels and was started on B12 injections.\par \par 11/27/18:\par Doing well. Hospitalized for 3 days for cellulitis/abcess of the back s/p drainage and on Abx currently. Developed 3 days after Mg infusion as per pt. \par Denies nausea, vomiting, chest pain, SOB, abdominal pain, bowel and bladder problems.\par This is the week on Revlimid (week 1).\par S/p 1 unit PRBC transfusion in the hospital. \par On Procrit weekly \par \par 12/27/18:\par Doing well. No major complaints.\par Denies fever, nausea, vomiting, chest pain, SOB, abdominal pain, and bladder problems.\par On Revlimid 5 mg 2 weeks on 1 week off. This is the week off. She will start the week on sunday (12/30/18).\par Due for Procrit 44196 units today. \par Still has diarrhea. 4-5bm/day.\par On monthly B12 injections. \par \par 1/30/19:\par Patient here for follow up for MDS.  She is taking Revlimid 5 mg, 2 weeks on, 1 week off.  She will complete this current cycle on Saturday.  She has no new complaints today.  Patient denies cough, shortness of breath, denies fever, denies bone pain.\par \par 03/04/2019\par Patient is here for a follow up visit. She complaints of severe pain in her left shoulder and has had abscess drained 2 weeks ago. However the pain is worse and she has purulent discharge on examination. She also has Nasal sores that are painful. Culture from 11/2018 showed MRSA.  Denies Fever. \par She also complaints of swelling in her right leg. Not tender and not erythematous and negative Gong;s sign. \par Her diarrhea with Revlimid is ongoing and Imodium and Lomotil helps but not everyday. \par She is on 60,000 units of procrit weekly and Revlimid 5 mg 2 weeks on 1 week off. \par \par 4/23/19\par Pt is here for follow up.\par She feels well.\par The nasal sores have improved with bactroban\par The abscess on left shoulder has resolved.\par However she has a new one on the middle of her back.\par No fever.\par Pt has been on procrit 04208 units weekly, however she missed a dose in between.\par \par 5/8/19\par pt is here for follow up.\par no new complaints.\par feels well.\par Her skin abscess has resolved.\par she has been unable to go to ID, as she could not get an appt.\par No bleeding.\par She is compliant with revlimid.\par \par 6/6/19\par Pt is here for follow up.\par no new complaints \par Feels well.\par Is on week 2 of her Revlimid cycle. \par No transfusions since last visit\par \par 7/17/19\par Pt is here for follow up.\par C/O fatigue.\par Was away for a few days two weeks ago, but missed treatment with procrit for 2 weeks.\par Is complaint with Revlimid 2 weeks on 1 week off.\par Diarrhea is a little improved.\par \par 8/14/19\par Patient here for follow up visit, feeling well.  Although she is complaining of some pain at the left scapula area recently.  Patient denies cough, shortness of breath, denies fever, denies other bone pain.  She is off Revlimid  this week, due to restart on Monday.  She is scheduled for Procrit and Vitamin B12 injection today.  She plans to leave the country tomorrow to visit her mother who is ill.  She will return in about 10 days.\par \par 9/11/19\par Pt is here for follow up.\par She was away for 3 weeks, out of which she took procrit for 2 weeks in [place].\par She missed last week's dose.\par She had CBCs in [place] which showed Hgb of 8.9\par She feels well.\par She still getting diarrhea.\par She has been using lomotil with very minimal relief.\par She started a new cycle of Revlimid 4 days ago.\par \par 10/3/19\par Patient here for follow up visit, feeling fairly well.   Patient denies cough, shortness of breath, denies fever, denies other bone pain.  She remains on Revlimid.  She is scheduled for Procrit and Vitamin B12 injection today.\par \par 10/24/19\par Pt is here for follow up.\par Feels well.\par No SOB, fatigue.\par Compliant with procrit and Revl;imid.\par Remains on ASa.\par Diarrhea is unchanged.\par Pt takes imodium as needed.\par \par 11/14/19\par Pt is here for follow up.\par No new complaints.\par Starts a new cycle of Revlimid today.\par Diarrhea is same as before, no rectal bleeding.\par No fever.\par \par 12/5/19\par Pt is here for follow up.\par No new complaints.\par Denies feeling weaker than usual.\par No fever, SOB.\par No change in frequency of diarrhea.\par No pain.\par Will start next cycle of Revlimid in 3 days.\par \par \par !/16/20\par Pt was recently DCed from [facility] 3 days ago after being treated for pneumonia and MARCO.\par She is on po Levaquin.\par She restarted Revlmid 3 days ago.\par She is feeling better now. No fever.\par No SOB, Chest pain and back pain has resolved.\par Pt has a cough, no expectoration.\par She also recd a unit of PRBCs last week in the hospital\par \par \par 1/30/20\par Patient here for follow up visit, feeling well.  She has no new complaints. Cough is almost gone completely.  Patient denies shortness of breath, denies fever, denies bone pain.  Her appetite is ok, weight is stable.  She is due to restart Revlimid on Monday.\par \par 02/20/20\par Pt is here for follow up, feeling well.\par Offers no new complaints. Denies SOB, fever, chills, weight loss, CP, cough. \par Currently off week of Revlimid 5 mg. Restarts on Monday.\par Has procrit 80,000 units today. Next b 12 injection due in 2 weeks \par Did not get chest Xray\par CBC reviewed WBC 3.1, h/h 8.3/25%, plt 386, neutrophils 1.29\par \par 3/12/20\par Pt is here for follow up.\par She took Revlimid for 2 weeks and then has been off for one week although she was advised to take it daily without break.\par No SOB, Cough, fever.\par Has mild fatigue.\par \par 04/02/2020\par SIMONA KUHN a 75 year F is here today for follow up visit of MDS.\par Denies fever, chills, cough,night sweats, weight loss. \par Denies bleeding or bruising.\par Pt receives procrit 80,000 units weekly and B12 IM monthly. \par Continued Revlimid 5 mg daily x 3 weeks, has 1 dose left tonight. \par CBC reviewed: WBC 3.2, H/H 8.1/25.2, neutrophils 1.6, PLT 72\par \par 4/20/2020: The patient is here for follow up . She has no complaints of fever, chills, dizziness , chest pain and shortness of breath . She is still with diarrhea , up to 10 watery BMs per day, responds poorly to imodium and lomotil. She is on Revlimid 5 mg daily , took last dose yesterday night. Her last Procrit was on April 13. \par \par 5/26/20\par Pt is here for follow up.\par She is feeling well. Denies SOB, chest pain, fever.\par Continues to have diarrhea although she is off of Revlmid.\par Thinks it may due to diabetic meds.\par Wants to discuss BM bx results\par \par 6/22/20\par Pt is here for follow up.\par Feels well. Denies any fever, N, V, D\par Continues to have diarrhea, she will talk to her PCP about changing metformin for DM.\par Has been needing PRBCs 1 unit each month\par \par 7/20/20\par Pt is here for follow up.\par No new complaints. Has been feeling well.\par No SOB, CP. \par No N, V, D.\par She has recd 2 units of PRBCs since May 20.\par \par 8/17/20\par Pt is here for a follow up visit.\par She is feeling well.\par No new symptoms. No N, V, D.\par No bruising or bleeding. She continues to need PRBCs every 2-3 weeks.\par \par 8/31/20\par Pt is here for follow up. She is feeling well. No N, V, D.\par She is tolerating the hydrea well. No SOB, CP\par No bruising ior bleeding\par \par 9/8/20\par Pt is here for follow up.\par She had been contacted by the [facility] last week to advise her to stop the hydrea. Pt did not check her messages and continued with Hydrea.\par No fever, N, V, bleeding.\par Saw Dr Ferrell last week.\par \par 9/14/20\par Pt is here for folow up.\par Besides her usual complaint of diarrhea she is feeling well.\par Recd 1 unit PRBC last week.\par Has stopped Hydrea.\par No fever, mouth sores.\par \par 9/29/20\par Pt is here for folow up.\par No new complaints.\par Is seeing GI for diarrhea net week.\par No fever, night sweats.\par REcd 3 units of PRBC this month\par \par 10/13/20\par Pt is here for follow up.\par No new complaints.\par Has not needed a transfusion since 3 weeks ago.\par Continues to have diarrhea, waiting to be seen by GI\par \par 10/26/20\par Pt is here for follow up.\par C/O feeling fatigued.\par Has CLARK.\par No nausea or vomiting.\par No fever.\par Diarrhea has improved a little. She is no longer on Metformin\par \par 11/9/20\par Pt is here for follow up.\par Feels well. Denies SOB, CP, fatigue\par \par 12/7/20- Patient is for follow up and is due for cycle 9 of Vidaza.  She still has loose BM sometimes 10 times a day and was seeing Dr. Corey from GI- did not follow up recently and is unable to get an appt with him. She has lost about 10 lbs since September. No N/V. No fever or chills. No CP/SOB. She has been getting PRBC transfusion every 3 weeks now\par \par 01/04/20 Pt presented today for f/u visit . She is s/p 8 cycles of vidaza and was started on Luspatercept in Dec , 2020 . So far she received 1 injection , she is due for 2nd injection today . Adverse effect profile was discussed with her in detail , she reports having some dizziness and weakness after first injection . She received blood transfusion last wk . Blood work from today reviewed as well and counts are adequate . She denies any fevers,  chills,  or any new symptoms . \par \par 1/25/21\par Pt is here for follow up.\par C/O diarrhea, otherwise feeling better.\par Has not needed a transfusion since 12/29\par \par 2/16/21\par Pt is here for follow up.\par Needed transfusion on 2/8/21.\par Continues to C/o fatigue. Diarrhea remains the same, has not made GI appt as yet.\par \par 3/8/21\par Pt is here for follow up.\par Feels better today. Recd 1 unit PRBCs last week.\par Diarrhea is same as before. has an appt with GI next week.\par No fever\par \par 3/30/21\par Pt is here for follow up.\par Feels better. Last transfusion was on 3/2/21\par Saw GI and was started on cholestyramine and metformin was DCED with resolution of diarrhea.\par She denies any SOB,\par Fatigue is better\par \par 4/20/2021\par Pt is here to f/u for MDS\par She is s/p Luspatercept cycle #6\par She is compliant with Aspirin\par She feels better today, appetite is good\par She denies shortness of breath, fatigue, or weakness \par She c/o of diarrhea but it has improved since she was started on Cholestyramine. Stool is soft and less in frequency\par She received COVID vaccine x 2 doses\par \par 5/11/21\par pt is here for follow up.\par Feels the same with fatigue, diarrhea.\par No SOB\par \par 6/21/21\par Pt is here for follow up.\par Feels well. No SOB, CP, N< V.\par Diarrhea is better controlled now.\par Last PRBC transfusion was 2 weeks ago.\par \par 7/19/21\par Pt is here for follow up.\par Feels a little tired, last transfusion was 6/1/21.\par No bleeding, fever, SOB\par \par 8/10/2021\par Patient is here to follow up for her MDS.\par She is on Luspatercept, tolerating well.\par She feels well today, the appetite is good.\par She denies shortness of breath, fatigue, fever, night sweats, abdominal pain, arthralgias/myalgias.\par \par 8/31/21\par Pt is here for follow up.\par C/O feeling very fatigued.\par No bleeding.\par No fever.\par \par 9/21/2021\par Patient is here to follow up for MDS.\par She is on Luspatercept and Retacrit, tolerating well.\par She gets blood transfusion as needed for Hgb <7 gm/dL\par She is c/o of fatigue, no shortness of breath, dizziness, chills or lightheadedness.\par She denies melena or hematochezia.\par Her appetite is good, no nausea/vomiting.\par \par 10/28/2021\par Patient is here to follow up for MDS.\par She is on Luspatercept and Retacrit, tolerating well.\par She gets blood transfusion to keep Hgb > 7 gm/dL.\par She is c/o of chronic fatigue, no shortness of breath, dizziness, chills or lightheadedness.\par She denies melena or hematochezia.\par Her appetite is good, no nausea/vomiting.\par She c/o of severe diarrhea, 10-12x/day watery stool while on Imodium in the last 2 weeks.\par Last colonoscopy was in 2016, benign as per patient.\par \par 11/18/21\par Pt is here for follow up. C/O fatigue. No SOB, CP\par \par 12/9/21\par Pt is here for follow up.\par Feels better today. Recd 2 units last week in ER.\par Planning to go to [place] for over a week and does not want to miss her procrit dose.\par Diarrhea is stable,\par \par 12/23/21\par Pt is here for follow up.\par Feels better. No SOB, CP. Going to [place] tomorrow. Has not been able to get Procrit injection from the pharmacy yet d/t insurance issues\par \par 2/3/22\par Pt is here for follow up. Feeling same as usual. No SOB, CP. Last transfusion was 2 weeks ago in ER>\par No bleeding reported\par \par 3/3/22\par Pt is here for follow up.\par C/O fatigue. Had black stools X2\par \par 3/30/22\par Pt is feeling well.\par Here for treatment and BM biopsy\par \par 4/21/22\par Pt is here fir follow up.\par Was in ER last week. Was give 2 units of PRBCs.\par Pt feels less tired today.\par Wants to discuss BM rslts [de-identified] : \par

## 2022-04-28 ENCOUNTER — APPOINTMENT (OUTPATIENT)
Dept: INFUSION THERAPY | Facility: CLINIC | Age: 77
End: 2022-04-28

## 2022-04-28 ENCOUNTER — LABORATORY RESULT (OUTPATIENT)
Age: 77
End: 2022-04-28

## 2022-04-28 LAB
HCT VFR BLD CALC: 28.8 %
HGB BLD-MCNC: 10 G/DL
MCHC RBC-ENTMCNC: 31.3 PG
MCHC RBC-ENTMCNC: 34.7 G/DL
MCV RBC AUTO: 90 FL
PLATELET # BLD AUTO: 470 K/UL
PMV BLD: 10.8 FL
RBC # BLD: 3.2 M/UL
RBC # FLD: 16.6 %
WBC # FLD AUTO: 10.48 K/UL

## 2022-04-28 RX ORDER — ERYTHROPOIETIN 10000 [IU]/ML
80000 INJECTION, SOLUTION INTRAVENOUS; SUBCUTANEOUS ONCE
Refills: 0 | Status: COMPLETED | OUTPATIENT
Start: 2022-04-28 | End: 2022-05-06

## 2022-04-28 RX ADMIN — ERYTHROPOIETIN 80000 UNIT(S): 10000 INJECTION, SOLUTION INTRAVENOUS; SUBCUTANEOUS at 13:29

## 2022-05-06 ENCOUNTER — LABORATORY RESULT (OUTPATIENT)
Age: 77
End: 2022-05-06

## 2022-05-06 ENCOUNTER — APPOINTMENT (OUTPATIENT)
Dept: INFUSION THERAPY | Facility: CLINIC | Age: 77
End: 2022-05-06

## 2022-05-06 ENCOUNTER — OUTPATIENT (OUTPATIENT)
Dept: OUTPATIENT SERVICES | Facility: HOSPITAL | Age: 77
LOS: 1 days | Discharge: HOME | End: 2022-05-06
Payer: MEDICARE

## 2022-05-06 DIAGNOSIS — D46.9 MYELODYSPLASTIC SYNDROME, UNSPECIFIED: ICD-10-CM

## 2022-05-06 DIAGNOSIS — Z51.11 ENCOUNTER FOR ANTINEOPLASTIC CHEMOTHERAPY: ICD-10-CM

## 2022-05-06 DIAGNOSIS — D64.9 ANEMIA, UNSPECIFIED: ICD-10-CM

## 2022-05-06 LAB
HCT VFR BLD CALC: 22.2 %
HGB BLD-MCNC: 7.6 G/DL
MCHC RBC-ENTMCNC: 31.1 PG
MCHC RBC-ENTMCNC: 34.2 G/DL
MCV RBC AUTO: 91 FL
PLATELET # BLD AUTO: 507 K/UL
PMV BLD: 10.6 FL
RBC # BLD: 2.44 M/UL
RBC # FLD: 17.2 %
WBC # FLD AUTO: 8.45 K/UL

## 2022-05-06 RX ORDER — ERYTHROPOIETIN 10000 [IU]/ML
80000 INJECTION, SOLUTION INTRAVENOUS; SUBCUTANEOUS ONCE
Refills: 0 | Status: COMPLETED | OUTPATIENT
Start: 2022-05-06 | End: 2022-05-13

## 2022-05-06 RX ORDER — PREGABALIN 225 MG/1
1000 CAPSULE ORAL ONCE
Refills: 0 | Status: COMPLETED | OUTPATIENT
Start: 2022-05-06 | End: 2022-05-06

## 2022-05-06 RX ADMIN — ERYTHROPOIETIN 80000 UNIT(S): 10000 INJECTION, SOLUTION INTRAVENOUS; SUBCUTANEOUS at 15:17

## 2022-05-06 RX ADMIN — PREGABALIN 1000 MICROGRAM(S): 225 CAPSULE ORAL at 15:17

## 2022-05-12 ENCOUNTER — LABORATORY RESULT (OUTPATIENT)
Age: 77
End: 2022-05-12

## 2022-05-12 ENCOUNTER — APPOINTMENT (OUTPATIENT)
Dept: HEMATOLOGY ONCOLOGY | Facility: CLINIC | Age: 77
End: 2022-05-12
Payer: MEDICARE

## 2022-05-12 ENCOUNTER — APPOINTMENT (OUTPATIENT)
Dept: INFUSION THERAPY | Facility: CLINIC | Age: 77
End: 2022-05-12
Payer: MEDICARE

## 2022-05-12 VITALS
DIASTOLIC BLOOD PRESSURE: 66 MMHG | TEMPERATURE: 98.2 F | HEIGHT: 62 IN | BODY MASS INDEX: 24.84 KG/M2 | HEART RATE: 114 BPM | WEIGHT: 135 LBS | SYSTOLIC BLOOD PRESSURE: 155 MMHG

## 2022-05-12 DIAGNOSIS — Z79.899 OTHER LONG TERM (CURRENT) DRUG THERAPY: ICD-10-CM

## 2022-05-12 PROCEDURE — 99214 OFFICE O/P EST MOD 30 MIN: CPT

## 2022-05-12 RX ORDER — LUSPATERCEPT 75 MG/1
110 INJECTION, POWDER, LYOPHILIZED, FOR SOLUTION SUBCUTANEOUS ONCE
Refills: 0 | Status: COMPLETED | OUTPATIENT
Start: 2022-05-12 | End: 2022-05-12

## 2022-05-12 RX ADMIN — LUSPATERCEPT 110 MILLIGRAM(S): 75 INJECTION, POWDER, LYOPHILIZED, FOR SOLUTION SUBCUTANEOUS at 15:46

## 2022-05-13 ENCOUNTER — APPOINTMENT (OUTPATIENT)
Dept: INFUSION THERAPY | Facility: CLINIC | Age: 77
End: 2022-05-13

## 2022-05-13 ENCOUNTER — APPOINTMENT (OUTPATIENT)
Dept: HEMATOLOGY ONCOLOGY | Facility: CLINIC | Age: 77
End: 2022-05-13

## 2022-05-13 VITALS
RESPIRATION RATE: 18 BRPM | DIASTOLIC BLOOD PRESSURE: 72 MMHG | TEMPERATURE: 97.9 F | HEART RATE: 89 BPM | SYSTOLIC BLOOD PRESSURE: 167 MMHG

## 2022-05-13 VITALS
HEART RATE: 98 BPM | RESPIRATION RATE: 18 BRPM | TEMPERATURE: 97.3 F | SYSTOLIC BLOOD PRESSURE: 147 MMHG | DIASTOLIC BLOOD PRESSURE: 64 MMHG

## 2022-05-13 VITALS
HEART RATE: 102 BPM | SYSTOLIC BLOOD PRESSURE: 167 MMHG | TEMPERATURE: 98.2 F | DIASTOLIC BLOOD PRESSURE: 67 MMHG | RESPIRATION RATE: 18 BRPM

## 2022-05-13 RX ORDER — ERYTHROPOIETIN 10000 [IU]/ML
80000 INJECTION, SOLUTION INTRAVENOUS; SUBCUTANEOUS ONCE
Refills: 0 | Status: COMPLETED | OUTPATIENT
Start: 2022-05-13 | End: 2022-05-13

## 2022-05-13 RX ADMIN — ERYTHROPOIETIN 80000 UNIT(S): 10000 INJECTION, SOLUTION INTRAVENOUS; SUBCUTANEOUS at 17:00

## 2022-05-13 RX ADMIN — ERYTHROPOIETIN 80000 UNIT(S): 10000 INJECTION, SOLUTION INTRAVENOUS; SUBCUTANEOUS at 16:53

## 2022-05-16 ENCOUNTER — APPOINTMENT (OUTPATIENT)
Dept: INFUSION THERAPY | Facility: CLINIC | Age: 77
End: 2022-05-16

## 2022-05-16 PROBLEM — Z79.899 ON LONG TERM DRUG THERAPY: Status: ACTIVE | Noted: 2018-10-31

## 2022-05-16 NOTE — HISTORY OF PRESENT ILLNESS
[de-identified] : She missed on appointment for procrit last week.\par She starts her next cycle on 6/25/18\par C/o back pain radiating down her left which occurred when she bent to  something.\par No change in diarrhea.\par \par 7/31/18:\par Doing well. On Revlimid for more than 2yrs, 5 mg 2 weeks on 1 week off. She will be starting week on tonight. \par C/o diarrhea. On Imodium 2 pills AM and 2 pills PM. Doesn't help much with diarrhea. \par C/o nausea, abdominal pain. \par \par \par Colonoscopy in 2016 was normal. \par Did not have blood transfusion for over 2 years. \par On procrit [id number] weekly. Missed last week on 7/24/18 as she was out of town. She has been non compliant with weekly Procrit.\par Mammogram in 2017 was normal. \par \par 9/11/18\par pt is here for follow up.\par She feels the lomotil helps with the diarrhea.\par She was away for a few weeks and missed her procrit injections.\par No fever, abdominal  discomfort has been less since since use of lomotil.\par She started a new cycle 2 days ago.\par \par 10/2/18:\par She will start Revlimid tonight (week on). 2 weeks on, 1 week off. \par On ASA 81mg. \par Denies fever, nausea, vomiting, chest pain, SOB, abdominal pain, and bladder problems.\par C/o diarrhea. \par S/p 2 units PRBC transfusion on 9/25/18. \par On Procrit 67230 units weekly. Not compliant every week. \par C/o intermittent dizziness. \par \par 10/31/18\par Pt is here for follow up.\par C/O significant fatigue.\par Continues to have diarrhea, takes imodium and lomotil as needed.\par C/o dry cough, no fever.\par Cough started a month ago. It is worse in the mornings however over last week it has been constant all day.\par No chest pain.\par She was found to have low B12 levels and was started on B12 injections.\par \par 11/27/18:\par Doing well. Hospitalized for 3 days for cellulitis/abcess of the back s/p drainage and on Abx currently. Developed 3 days after Mg infusion as per pt. \par Denies nausea, vomiting, chest pain, SOB, abdominal pain, bowel and bladder problems.\par This is the week on Revlimid (week 1).\par S/p 1 unit PRBC transfusion in the hospital. \par On Procrit weekly \par \par 12/27/18:\par Doing well. No major complaints.\par Denies fever, nausea, vomiting, chest pain, SOB, abdominal pain, and bladder problems.\par On Revlimid 5 mg 2 weeks on 1 week off. This is the week off. She will start the week on sunday (12/30/18).\par Due for Procrit 59798 units today. \par Still has diarrhea. 4-5bm/day.\par On monthly B12 injections. \par \par 1/30/19:\par Patient here for follow up for MDS.  She is taking Revlimid 5 mg, 2 weeks on, 1 week off.  She will complete this current cycle on Saturday.  She has no new complaints today.  Patient denies cough, shortness of breath, denies fever, denies bone pain.\par \par 03/04/2019\par Patient is here for a follow up visit. She complaints of severe pain in her left shoulder and has had abscess drained 2 weeks ago. However the pain is worse and she has purulent discharge on examination. She also has Nasal sores that are painful. Culture from 11/2018 showed MRSA.  Denies Fever. \par She also complaints of swelling in her right leg. Not tender and not erythematous and negative Gong;s sign. \par Her diarrhea with Revlimid is ongoing and Imodium and Lomotil helps but not everyday. \par She is on 60,000 units of procrit weekly and Revlimid 5 mg 2 weeks on 1 week off. \par \par 4/23/19\par Pt is here for follow up.\par She feels well.\par The nasal sores have improved with bactroban\par The abscess on left shoulder has resolved.\par However she has a new one on the middle of her back.\par No fever.\par Pt has been on procrit 44979 units weekly, however she missed a dose in between.\par \par 5/8/19\par pt is here for follow up.\par no new complaints.\par feels well.\par Her skin abscess has resolved.\par she has been unable to go to ID, as she could not get an appt.\par No bleeding.\par She is compliant with revlimid.\par \par 6/6/19\par Pt is here for follow up.\par no new complaints \par Feels well.\par Is on week 2 of her Revlimid cycle. \par No transfusions since last visit\par \par 7/17/19\par Pt is here for follow up.\par C/O fatigue.\par Was away for a few days two weeks ago, but missed treatment with procrit for 2 weeks.\par Is complaint with Revlimid 2 weeks on 1 week off.\par Diarrhea is a little improved.\par \par 8/14/19\par Patient here for follow up visit, feeling well.  Although she is complaining of some pain at the left scapula area recently.  Patient denies cough, shortness of breath, denies fever, denies other bone pain.  She is off Revlimid  this week, due to restart on Monday.  She is scheduled for Procrit and Vitamin B12 injection today.  She plans to leave the country tomorrow to visit her mother who is ill.  She will return in about 10 days.\par \par 9/11/19\par Pt is here for follow up.\par She was away for 3 weeks, out of which she took procrit for 2 weeks in [place].\par She missed last week's dose.\par She had CBCs in [place] which showed Hgb of 8.9\par She feels well.\par She still getting diarrhea.\par She has been using lomotil with very minimal relief.\par She started a new cycle of Revlimid 4 days ago.\par \par 10/3/19\par Patient here for follow up visit, feeling fairly well.   Patient denies cough, shortness of breath, denies fever, denies other bone pain.  She remains on Revlimid.  She is scheduled for Procrit and Vitamin B12 injection today.\par \par 10/24/19\par Pt is here for follow up.\par Feels well.\par No SOB, fatigue.\par Compliant with procrit and Revl;imid.\par Remains on ASa.\par Diarrhea is unchanged.\par Pt takes imodium as needed.\par \par 11/14/19\par Pt is here for follow up.\par No new complaints.\par Starts a new cycle of Revlimid today.\par Diarrhea is same as before, no rectal bleeding.\par No fever.\par \par 12/5/19\par Pt is here for follow up.\par No new complaints.\par Denies feeling weaker than usual.\par No fever, SOB.\par No change in frequency of diarrhea.\par No pain.\par Will start next cycle of Revlimid in 3 days.\par \par \par !/16/20\par Pt was recently DCed from [facility] 3 days ago after being treated for pneumonia and MARCO.\par She is on po Levaquin.\par She restarted Revlmid 3 days ago.\par She is feeling better now. No fever.\par No SOB, Chest pain and back pain has resolved.\par Pt has a cough, no expectoration.\par She also recd a unit of PRBCs last week in the hospital\par \par \par 1/30/20\par Patient here for follow up visit, feeling well.  She has no new complaints. Cough is almost gone completely.  Patient denies shortness of breath, denies fever, denies bone pain.  Her appetite is ok, weight is stable.  She is due to restart Revlimid on Monday.\par \par 02/20/20\par Pt is here for follow up, feeling well.\par Offers no new complaints. Denies SOB, fever, chills, weight loss, CP, cough. \par Currently off week of Revlimid 5 mg. Restarts on Monday.\par Has procrit 80,000 units today. Next b 12 injection due in 2 weeks \par Did not get chest Xray\par CBC reviewed WBC 3.1, h/h 8.3/25%, plt 386, neutrophils 1.29\par \par 3/12/20\par Pt is here for follow up.\par She took Revlimid for 2 weeks and then has been off for one week although she was advised to take it daily without break.\par No SOB, Cough, fever.\par Has mild fatigue.\par \par 04/02/2020\par SIMONA KUHN a 75 year F is here today for follow up visit of MDS.\par Denies fever, chills, cough,night sweats, weight loss. \par Denies bleeding or bruising.\par Pt receives procrit 80,000 units weekly and B12 IM monthly. \par Continued Revlimid 5 mg daily x 3 weeks, has 1 dose left tonight. \par CBC reviewed: WBC 3.2, H/H 8.1/25.2, neutrophils 1.6, PLT 72\par \par 4/20/2020: The patient is here for follow up . She has no complaints of fever, chills, dizziness , chest pain and shortness of breath . She is still with diarrhea , up to 10 watery BMs per day, responds poorly to imodium and lomotil. She is on Revlimid 5 mg daily , took last dose yesterday night. Her last Procrit was on April 13. \par \par 5/26/20\par Pt is here for follow up.\par She is feeling well. Denies SOB, chest pain, fever.\par Continues to have diarrhea although she is off of Revlmid.\par Thinks it may due to diabetic meds.\par Wants to discuss BM bx results\par \par 6/22/20\par Pt is here for follow up.\par Feels well. Denies any fever, N, V, D\par Continues to have diarrhea, she will talk to her PCP about changing metformin for DM.\par Has been needing PRBCs 1 unit each month\par \par 7/20/20\par Pt is here for follow up.\par No new complaints. Has been feeling well.\par No SOB, CP. \par No N, V, D.\par She has recd 2 units of PRBCs since May 20.\par \par 8/17/20\par Pt is here for a follow up visit.\par She is feeling well.\par No new symptoms. No N, V, D.\par No bruising or bleeding. She continues to need PRBCs every 2-3 weeks.\par \par 8/31/20\par Pt is here for follow up. She is feeling well. No N, V, D.\par She is tolerating the hydrea well. No SOB, CP\par No bruising ior bleeding\par \par 9/8/20\par Pt is here for follow up.\par She had been contacted by the [facility] last week to advise her to stop the hydrea. Pt did not check her messages and continued with Hydrea.\par No fever, N, V, bleeding.\par Saw Dr Ferrell last week.\par \par 9/14/20\par Pt is here for folow up.\par Besides her usual complaint of diarrhea she is feeling well.\par Recd 1 unit PRBC last week.\par Has stopped Hydrea.\par No fever, mouth sores.\par \par 9/29/20\par Pt is here for folow up.\par No new complaints.\par Is seeing GI for diarrhea net week.\par No fever, night sweats.\par REcd 3 units of PRBC this month\par \par 10/13/20\par Pt is here for follow up.\par No new complaints.\par Has not needed a transfusion since 3 weeks ago.\par Continues to have diarrhea, waiting to be seen by GI\par \par 10/26/20\par Pt is here for follow up.\par C/O feeling fatigued.\par Has CLARK.\par No nausea or vomiting.\par No fever.\par Diarrhea has improved a little. She is no longer on Metformin\par \par 11/9/20\par Pt is here for follow up.\par Feels well. Denies SOB, CP, fatigue\par \par 12/7/20- Patient is for follow up and is due for cycle 9 of Vidaza.  She still has loose BM sometimes 10 times a day and was seeing Dr. Corey from GI- did not follow up recently and is unable to get an appt with him. She has lost about 10 lbs since September. No N/V. No fever or chills. No CP/SOB. She has been getting PRBC transfusion every 3 weeks now\par \par 01/04/20 Pt presented today for f/u visit . She is s/p 8 cycles of vidaza and was started on Luspatercept in Dec , 2020 . So far she received 1 injection , she is due for 2nd injection today . Adverse effect profile was discussed with her in detail , she reports having some dizziness and weakness after first injection . She received blood transfusion last wk . Blood work from today reviewed as well and counts are adequate . She denies any fevers,  chills,  or any new symptoms . \par \par 1/25/21\par Pt is here for follow up.\par C/O diarrhea, otherwise feeling better.\par Has not needed a transfusion since 12/29\par \par 2/16/21\par Pt is here for follow up.\par Needed transfusion on 2/8/21.\par Continues to C/o fatigue. Diarrhea remains the same, has not made GI appt as yet.\par \par 3/8/21\par Pt is here for follow up.\par Feels better today. Recd 1 unit PRBCs last week.\par Diarrhea is same as before. has an appt with GI next week.\par No fever\par \par 3/30/21\par Pt is here for follow up.\par Feels better. Last transfusion was on 3/2/21\par Saw GI and was started on cholestyramine and metformin was DCED with resolution of diarrhea.\par She denies any SOB,\par Fatigue is better\par \par 4/20/2021\par Pt is here to f/u for MDS\par She is s/p Luspatercept cycle #6\par She is compliant with Aspirin\par She feels better today, appetite is good\par She denies shortness of breath, fatigue, or weakness \par She c/o of diarrhea but it has improved since she was started on Cholestyramine. Stool is soft and less in frequency\par She received COVID vaccine x 2 doses\par \par 5/11/21\par pt is here for follow up.\par Feels the same with fatigue, diarrhea.\par No SOB\par \par 6/21/21\par Pt is here for follow up.\par Feels well. No SOB, CP, N< V.\par Diarrhea is better controlled now.\par Last PRBC transfusion was 2 weeks ago.\par \par 7/19/21\par Pt is here for follow up.\par Feels a little tired, last transfusion was 6/1/21.\par No bleeding, fever, SOB\par \par 8/10/2021\par Patient is here to follow up for her MDS.\par She is on Luspatercept, tolerating well.\par She feels well today, the appetite is good.\par She denies shortness of breath, fatigue, fever, night sweats, abdominal pain, arthralgias/myalgias.\par \par 8/31/21\par Pt is here for follow up.\par C/O feeling very fatigued.\par No bleeding.\par No fever.\par \par 9/21/2021\par Patient is here to follow up for MDS.\par She is on Luspatercept and Retacrit, tolerating well.\par She gets blood transfusion as needed for Hgb <7 gm/dL\par She is c/o of fatigue, no shortness of breath, dizziness, chills or lightheadedness.\par She denies melena or hematochezia.\par Her appetite is good, no nausea/vomiting.\par \par 10/28/2021\par Patient is here to follow up for MDS.\par She is on Luspatercept and Retacrit, tolerating well.\par She gets blood transfusion to keep Hgb > 7 gm/dL.\par She is c/o of chronic fatigue, no shortness of breath, dizziness, chills or lightheadedness.\par She denies melena or hematochezia.\par Her appetite is good, no nausea/vomiting.\par She c/o of severe diarrhea, 10-12x/day watery stool while on Imodium in the last 2 weeks.\par Last colonoscopy was in 2016, benign as per patient.\par \par 11/18/21\par Pt is here for follow up. C/O fatigue. No SOB, CP\par \par 12/9/21\par Pt is here for follow up.\par Feels better today. Recd 2 units last week in ER.\par Planning to go to [place] for over a week and does not want to miss her procrit dose.\par Diarrhea is stable,\par \par 12/23/21\par Pt is here for follow up.\par Feels better. No SOB, CP. Going to [place] tomorrow. Has not been able to get Procrit injection from the pharmacy yet d/t insurance issues\par \par 2/3/22\par Pt is here for follow up. Feeling same as usual. No SOB, CP. Last transfusion was 2 weeks ago in ER>\par No bleeding reported\par \par 3/3/22\par Pt is here for follow up.\par C/O fatigue. Had black stools X2\par \par 3/30/22\par Pt is feeling well.\par Here for treatment and BM biopsy\par \par 4/21/22\par Pt is here fir follow up.\par Was in ER last week. Was give 2 units of PRBCs.\par Pt feels less tired today.\par Wants to discuss BM rslts\par \par 5/12/22\par Pt is here today for follow up visit.\par Pt c/o feeling tired.  Hgb=6.9.  One unit transfusion scheduled.\par  [de-identified] : \par

## 2022-05-16 NOTE — ASSESSMENT
[FreeTextEntry1] : Patient is a 77 year old female with # Lowrisk myelodysplastic syndrome, previously treated with Revlimid and Procrit and Vidaza . 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 \par Patient is s/p  9 cycles of Vidaza and she was requiring pRBC transfusion every 3 weeks \par Given the persistent transfusion dependence she was switched to Luspatercept.\par \par \par DETECTED GENOMIC ALTERATIONS:\par Tier III: Variants of Unknown Clinical Significance\par SF3B1 [id number]\par Hyperkalemia likely due to increased platelet; pseudohyperkalemia.\par \par PLAN:\par -- Continue Luspatercept 1.75 mg/kg every 3 weeks, \par --Continue Retacrit (Procrit)  80,000 units every week.\par Side effects of Procrit discussed including but not limited to: hypertension, headache, pruritus, nausea, vomiting, injection site pain, arthralgia, cough, fever.\par -- Monitor CBC weekly and administer 1 PRBC unit  as needed to keep Hgb >7 gm/dL.  Pt has e/o iron overload so transfusions need to be kept at a minimum if possible\par \par # Vitamin  B 12 deficiency\par -- Continue Vitamin B 12 injection every month\par \par # Thrombocytosis\par -- S/P Hydrea\par -- Will continue to monitor.\par \par rslts of  BM biopsy d/w pt. No e/o increased blasts. Pt is needing PRBCs almost every 3 weeks. Advised to follow up with Dr Ferrell to consider any other option or clinical trial\par Labs reviewed, \par \par RTC in 3 weeks \par \par \par \par

## 2022-05-16 NOTE — PHYSICAL EXAM
[Restricted in physically strenuous activity but ambulatory and able to carry out work of a light or sedentary nature] : Status 1- Restricted in physically strenuous activity but ambulatory and able to carry out work of a light or sedentary nature, e.g., light house work, office work [Normal] : affect appropriate [de-identified] : pallor

## 2022-05-20 ENCOUNTER — APPOINTMENT (OUTPATIENT)
Dept: INFUSION THERAPY | Facility: CLINIC | Age: 77
End: 2022-05-20

## 2022-05-20 ENCOUNTER — LABORATORY RESULT (OUTPATIENT)
Age: 77
End: 2022-05-20

## 2022-05-20 RX ORDER — ERYTHROPOIETIN 10000 [IU]/ML
80000 INJECTION, SOLUTION INTRAVENOUS; SUBCUTANEOUS ONCE
Refills: 0 | Status: COMPLETED | OUTPATIENT
Start: 2022-05-20 | End: 2022-05-20

## 2022-05-20 RX ADMIN — ERYTHROPOIETIN 80000 UNIT(S): 10000 INJECTION, SOLUTION INTRAVENOUS; SUBCUTANEOUS at 14:35

## 2022-05-26 ENCOUNTER — LABORATORY RESULT (OUTPATIENT)
Age: 77
End: 2022-05-26

## 2022-05-26 ENCOUNTER — APPOINTMENT (OUTPATIENT)
Dept: INFUSION THERAPY | Facility: CLINIC | Age: 77
End: 2022-05-26

## 2022-05-26 RX ORDER — ERYTHROPOIETIN 10000 [IU]/ML
80000 INJECTION, SOLUTION INTRAVENOUS; SUBCUTANEOUS ONCE
Refills: 0 | Status: COMPLETED | OUTPATIENT
Start: 2022-05-26 | End: 2022-05-26

## 2022-05-26 RX ADMIN — ERYTHROPOIETIN 80000 UNIT(S): 10000 INJECTION, SOLUTION INTRAVENOUS; SUBCUTANEOUS at 13:10

## 2022-05-31 ENCOUNTER — APPOINTMENT (OUTPATIENT)
Dept: HEMATOLOGY ONCOLOGY | Facility: CLINIC | Age: 77
End: 2022-05-31

## 2022-06-02 ENCOUNTER — LABORATORY RESULT (OUTPATIENT)
Age: 77
End: 2022-06-02

## 2022-06-02 ENCOUNTER — APPOINTMENT (OUTPATIENT)
Dept: INFUSION THERAPY | Facility: CLINIC | Age: 77
End: 2022-06-02

## 2022-06-02 ENCOUNTER — EMERGENCY (EMERGENCY)
Facility: HOSPITAL | Age: 77
LOS: 0 days | Discharge: HOME | End: 2022-06-03
Attending: EMERGENCY MEDICINE | Admitting: EMERGENCY MEDICINE
Payer: MEDICARE

## 2022-06-02 VITALS
OXYGEN SATURATION: 100 % | RESPIRATION RATE: 18 BRPM | TEMPERATURE: 99 F | HEART RATE: 111 BPM | DIASTOLIC BLOOD PRESSURE: 95 MMHG | HEIGHT: 62 IN | WEIGHT: 164.91 LBS | SYSTOLIC BLOOD PRESSURE: 133 MMHG

## 2022-06-02 DIAGNOSIS — E11.9 TYPE 2 DIABETES MELLITUS WITHOUT COMPLICATIONS: ICD-10-CM

## 2022-06-02 DIAGNOSIS — Z79.84 LONG TERM (CURRENT) USE OF ORAL HYPOGLYCEMIC DRUGS: ICD-10-CM

## 2022-06-02 DIAGNOSIS — Z79.899 OTHER LONG TERM (CURRENT) DRUG THERAPY: ICD-10-CM

## 2022-06-02 DIAGNOSIS — R53.83 OTHER FATIGUE: ICD-10-CM

## 2022-06-02 DIAGNOSIS — D64.9 ANEMIA, UNSPECIFIED: ICD-10-CM

## 2022-06-02 LAB
ALBUMIN SERPL ELPH-MCNC: 4.1 G/DL — SIGNIFICANT CHANGE UP (ref 3.5–5.2)
ALP SERPL-CCNC: 156 U/L — HIGH (ref 30–115)
ALT FLD-CCNC: 21 U/L — SIGNIFICANT CHANGE UP (ref 0–41)
ANION GAP SERPL CALC-SCNC: 16 MMOL/L — HIGH (ref 7–14)
ANISOCYTOSIS BLD QL: SLIGHT — SIGNIFICANT CHANGE UP
AST SERPL-CCNC: 15 U/L — SIGNIFICANT CHANGE UP (ref 0–41)
BASOPHILS # BLD AUTO: 0.05 K/UL — SIGNIFICANT CHANGE UP (ref 0–0.2)
BASOPHILS NFR BLD AUTO: 0.9 % — SIGNIFICANT CHANGE UP (ref 0–1)
BILIRUB SERPL-MCNC: 0.4 MG/DL — SIGNIFICANT CHANGE UP (ref 0.2–1.2)
BLD GP AB SCN SERPL QL: NORMAL
BLD GP AB SCN SERPL QL: SIGNIFICANT CHANGE UP
BUN SERPL-MCNC: 34 MG/DL — HIGH (ref 10–20)
CALCIUM SERPL-MCNC: 8.6 MG/DL — SIGNIFICANT CHANGE UP (ref 8.5–10.1)
CHLORIDE SERPL-SCNC: 100 MMOL/L — SIGNIFICANT CHANGE UP (ref 98–110)
CO2 SERPL-SCNC: 15 MMOL/L — LOW (ref 17–32)
CREAT SERPL-MCNC: 1.6 MG/DL — HIGH (ref 0.7–1.5)
EGFR: 33 ML/MIN/1.73M2 — LOW
EOSINOPHIL # BLD AUTO: 0 K/UL — SIGNIFICANT CHANGE UP (ref 0–0.7)
EOSINOPHIL NFR BLD AUTO: 0 % — SIGNIFICANT CHANGE UP (ref 0–8)
GIANT PLATELETS BLD QL SMEAR: PRESENT — SIGNIFICANT CHANGE UP
GLUCOSE BLDC GLUCOMTR-MCNC: 321 MG/DL — HIGH (ref 70–99)
GLUCOSE SERPL-MCNC: 351 MG/DL — HIGH (ref 70–99)
HCT VFR BLD CALC: 15.3 % — LOW (ref 37–47)
HCT VFR BLD CALC: 19.9 %
HCT VFR BLD CALC: 22.5 %
HCT VFR BLD CALC: 23.6 %
HGB BLD-MCNC: 5.3 G/DL — CRITICAL LOW (ref 12–16)
HGB BLD-MCNC: 6.9 G/DL
HGB BLD-MCNC: 7.7 G/DL
HGB BLD-MCNC: 8.3 G/DL
LYMPHOCYTES # BLD AUTO: 0.93 K/UL — LOW (ref 1.2–3.4)
LYMPHOCYTES # BLD AUTO: 17.8 % — LOW (ref 20.5–51.1)
MANUAL SMEAR VERIFICATION: SIGNIFICANT CHANGE UP
MCHC RBC-ENTMCNC: 31 PG
MCHC RBC-ENTMCNC: 31.3 PG
MCHC RBC-ENTMCNC: 31.7 PG
MCHC RBC-ENTMCNC: 32.1 PG — HIGH (ref 27–31)
MCHC RBC-ENTMCNC: 34.2 G/DL
MCHC RBC-ENTMCNC: 34.6 G/DL — SIGNIFICANT CHANGE UP (ref 32–37)
MCHC RBC-ENTMCNC: 34.7 G/DL
MCHC RBC-ENTMCNC: 35.2 G/DL
MCV RBC AUTO: 89.1 FL
MCV RBC AUTO: 90.7 FL
MCV RBC AUTO: 91.3 FL
MCV RBC AUTO: 92.7 FL — SIGNIFICANT CHANGE UP (ref 81–99)
MICROCYTES BLD QL: SLIGHT — SIGNIFICANT CHANGE UP
MONOCYTES # BLD AUTO: 0.29 K/UL — SIGNIFICANT CHANGE UP (ref 0.1–0.6)
MONOCYTES NFR BLD AUTO: 5.6 % — SIGNIFICANT CHANGE UP (ref 1.7–9.3)
NEUTROPHILS # BLD AUTO: 3.9 K/UL — SIGNIFICANT CHANGE UP (ref 1.4–6.5)
NEUTROPHILS NFR BLD AUTO: 74.8 % — SIGNIFICANT CHANGE UP (ref 42.2–75.2)
NRBC # BLD: 2 /100 — HIGH (ref 0–0)
NRBC # BLD: SIGNIFICANT CHANGE UP /100 WBCS (ref 0–0)
PLAT MORPH BLD: NORMAL — SIGNIFICANT CHANGE UP
PLATELET # BLD AUTO: 381 K/UL — SIGNIFICANT CHANGE UP (ref 130–400)
PLATELET # BLD AUTO: 424 K/UL
PLATELET # BLD AUTO: 449 K/UL
PLATELET # BLD AUTO: 523 K/UL
PMV BLD: 10.8 FL
PMV BLD: 11.1 FL
PMV BLD: 11.2 FL
POLYCHROMASIA BLD QL SMEAR: SLIGHT — SIGNIFICANT CHANGE UP
POTASSIUM SERPL-MCNC: 4.8 MMOL/L — SIGNIFICANT CHANGE UP (ref 3.5–5)
POTASSIUM SERPL-SCNC: 4.8 MMOL/L — SIGNIFICANT CHANGE UP (ref 3.5–5)
PROT SERPL-MCNC: 5.5 G/DL — LOW (ref 6–8)
RBC # BLD: 1.65 M/UL — LOW (ref 4.2–5.4)
RBC # BLD: 2.18 M/UL
RBC # BLD: 2.48 M/UL
RBC # BLD: 2.65 M/UL
RBC # FLD: 17.2 %
RBC # FLD: 17.5 %
RBC # FLD: 17.9 %
RBC # FLD: 18.2 % — HIGH (ref 11.5–14.5)
RBC BLD AUTO: ABNORMAL
SCHISTOCYTES BLD QL AUTO: SLIGHT — SIGNIFICANT CHANGE UP
SMUDGE CELLS # BLD: PRESENT — SIGNIFICANT CHANGE UP
SODIUM SERPL-SCNC: 131 MMOL/L — LOW (ref 135–146)
TROPONIN T SERPL-MCNC: <0.01 NG/ML — SIGNIFICANT CHANGE UP
VARIANT LYMPHS # BLD: 0.9 % — SIGNIFICANT CHANGE UP (ref 0–5)
WBC # BLD: 5.22 K/UL — SIGNIFICANT CHANGE UP (ref 4.8–10.8)
WBC # FLD AUTO: 5.22 K/UL — SIGNIFICANT CHANGE UP (ref 4.8–10.8)
WBC # FLD AUTO: 6.94 K/UL
WBC # FLD AUTO: 7.78 K/UL
WBC # FLD AUTO: 9.3 K/UL

## 2022-06-02 PROCEDURE — 93010 ELECTROCARDIOGRAM REPORT: CPT

## 2022-06-02 PROCEDURE — 99218: CPT

## 2022-06-02 RX ORDER — PREGABALIN 225 MG/1
1000 CAPSULE ORAL ONCE
Refills: 0 | Status: COMPLETED | OUTPATIENT
Start: 2022-06-02 | End: 2022-06-02

## 2022-06-02 RX ORDER — LUSPATERCEPT 75 MG/1
110 INJECTION, POWDER, LYOPHILIZED, FOR SOLUTION SUBCUTANEOUS ONCE
Refills: 0 | Status: COMPLETED | OUTPATIENT
Start: 2022-06-02 | End: 2022-06-02

## 2022-06-02 RX ORDER — INSULIN HUMAN 100 [IU]/ML
10 INJECTION, SOLUTION SUBCUTANEOUS ONCE
Refills: 0 | Status: COMPLETED | OUTPATIENT
Start: 2022-06-02 | End: 2022-06-02

## 2022-06-02 RX ORDER — ERYTHROPOIETIN 10000 [IU]/ML
80000 INJECTION, SOLUTION INTRAVENOUS; SUBCUTANEOUS ONCE
Refills: 0 | Status: COMPLETED | OUTPATIENT
Start: 2022-06-02 | End: 2022-06-02

## 2022-06-02 RX ADMIN — LUSPATERCEPT 110 MILLIGRAM(S): 75 INJECTION, POWDER, LYOPHILIZED, FOR SOLUTION SUBCUTANEOUS at 15:53

## 2022-06-02 RX ADMIN — ERYTHROPOIETIN 80000 UNIT(S): 10000 INJECTION, SOLUTION INTRAVENOUS; SUBCUTANEOUS at 15:52

## 2022-06-02 RX ADMIN — PREGABALIN 1000 MICROGRAM(S): 225 CAPSULE ORAL at 15:52

## 2022-06-02 NOTE — ED PROVIDER NOTE - CARE PROVIDER_API CALL
ERNESTO SIEGEL  Internal Medicine  [address] [place] [address]  Phone: [phone]  Fax: [phone]  Follow Up Time: 1-3 Days

## 2022-06-02 NOTE — ED ADULT TRIAGE NOTE - HEIGHT IN INCHES
Observation resident spoke with ortho surg and trauma surg, patient ok for discharge. Patient going to SNF set up by case management. Outpatient PT set up through SNF per . 2

## 2022-06-02 NOTE — ED CDU PROVIDER INITIAL DAY NOTE - MEDICAL DECISION MAKING DETAILS
patient presenting with anemia history of mds, sent from [facility] for transfusion no signs of bleeding.

## 2022-06-02 NOTE — ED PROVIDER NOTE - ATTENDING CONTRIBUTION TO CARE
Patient with history of MDS requiring nearly monthly transfusions.  Follows with the [facility].  Presents with low hemoglobin that was noted today with routine blood work.  Was sent here for transfusion.  She does endorse mild generalized weakness.  She denies any blood in her stool.  There is no vomiting.  Denies any chest pain or shortness of breath.  On exam her lungs are clear heart is normal abdomen is soft.  Plan will be to transfuse Patient with history of MDS requiring nearly monthly transfusions.  Follows with the [facility].  Presents with low hemoglobin that was noted today with routine blood work.  Was sent here for transfusion.  She does endorse mild generalized weakness.  She denies any blood in her stool.  There is no vomiting.  Denies any chest pain or shortness of breath.  On exam her lungs are clear heart is normal abdomen is soft.  Plan will be to transfuse

## 2022-06-02 NOTE — ED PEDIATRIC NURSE REASSESSMENT NOTE - NS ED NURSE REASSESS COMMENT FT2
Report given to NICOLAS Holland in ED3. Pt. placed in observation. Pending blood transfusion. A&ox4, breathing spontaneous and unlabored, NAD. Updated on plan

## 2022-06-02 NOTE — ED CDU PROVIDER INITIAL DAY NOTE - NS ED ROS FT
GEN:  no fever, no chills, +fatigue  NEURO:  no headache, no dizziness	  ENT: no sore throat, no runny nose  CV:  no chest pain, no palpitations  RESP:  no sob, no cough  GI:  no nausea, no vomiting, no abdominal pain, no diarrhea  :  no dysuria, no urinary frequency, no hematuria  MSK:  no joint pain, no edema  SKIN:  no rash, no bruising

## 2022-06-02 NOTE — ED CDU PROVIDER INITIAL DAY NOTE - ATTENDING APP SHARED VISIT CONTRIBUTION OF CARE
patient presenting with anemia history of mds, sent from [facility] for transfusion no signs of bleeding.

## 2022-06-02 NOTE — ED CDU PROVIDER INITIAL DAY NOTE - OBJECTIVE STATEMENT
78 yo female, PMHx of DM, MDS, anemia with retacrit injections and near monthly blood transfusions, presents from [facility] with anemia. Patient state she has been a little more fatigued as of lately. Denies fevers, chills, chest pain, shortness of breath, abdominal pain, hematemesis, melena, headache, dizziness.

## 2022-06-02 NOTE — ED PROVIDER NOTE - NSFOLLOWUPINSTRUCTIONS_ED_ALL_ED_FT
Anemia       Anemia is a condition in which there is not enough red blood cells or hemoglobin in the blood. Hemoglobin is a substance in red blood cells that carries oxygen.    When you do not have enough red blood cells or hemoglobin (are anemic), your body cannot get enough oxygen and your organs may not work properly. As a result, you may feel very tired or have other problems.      What are the causes?    Common causes of anemia include:  •Excessive bleeding. Anemia can be caused by excessive bleeding inside or outside the body, including bleeding from the intestines or from heavy menstrual periods in females.      •Poor nutrition.      •Long-lasting (chronic) kidney, thyroid, and liver disease.      •Bone marrow disorders, spleen problems, and blood disorders.      •Cancer and treatments for cancer.      •HIV (human immunodeficiency virus) and AIDS (acquired immunodeficiency syndrome).      •Infections, medicines, and autoimmune disorders that destroy red blood cells.        What are the signs or symptoms?    Symptoms of this condition include:  •Minor weakness.      •Dizziness.      •Headache, or difficulties concentrating and sleeping.      •Heartbeats that feel irregular or faster than normal (palpitations).      •Shortness of breath, especially with exercise.      •Pale skin, lips, and nails, or cold hands and feet.      •Indigestion and nausea.      Symptoms may occur suddenly or develop slowly. If your anemia is mild, you may not have symptoms.      How is this diagnosed?    This condition is diagnosed based on blood tests, your medical history, and a physical exam. In some cases, a test may be needed in which cells are removed from the soft tissue inside of a bone and looked at under a microscope (bone marrow biopsy). Your health care provider may also check your stool (feces) for blood and may do additional testing to look for the cause of your bleeding.    Other tests may include:  •Imaging tests, such as a CT scan or MRI.      •A procedure to see inside your esophagus and stomach (endoscopy).      •A procedure to see inside your colon and rectum (colonoscopy).        How is this treated?    Treatment for this condition depends on the cause. If you continue to lose a lot of blood, you may need to be treated at a hospital. Treatment may include:  •Taking supplements of iron, vitamin B12, or folic acid.      •Taking a hormone medicine (erythropoietin) that can help to stimulate red blood cell growth.      •Having a blood transfusion. This may be needed if you lose a lot of blood.      •Making changes to your diet.      •Having surgery to remove your spleen.        Follow these instructions at home:    •Take over-the-counter and prescription medicines only as told by your health care provider.      •Take supplements only as told by your health care provider.      •Follow any diet instructions that you were given by your health care provider.      •Keep all follow-up visits as told by your health care provider. This is important.        Contact a health care provider if:    •You develop new bleeding anywhere in the body.        Get help right away if:    •You are very weak.      •You are short of breath.      •You have pain in your abdomen or chest.      •You are dizzy or feel faint.      •You have trouble concentrating.      •You have bloody stools, black stools, or tarry stools.      •You vomit repeatedly or you vomit up blood.      These symptoms may represent a serious problem that is an emergency. Do not wait to see if the symptoms will go away. Get medical help right away. Call your local emergency services (911 in the [place] Do not drive yourself to the hospital.       Summary    •Anemia is a condition in which you do not have enough red blood cells or enough of a substance in your red blood cells that carries oxygen (hemoglobin).      •Symptoms may occur suddenly or develop slowly.      •If your anemia is mild, you may not have symptoms.      •This condition is diagnosed with blood tests, a medical history, and a physical exam. Other tests may be needed.      •Treatment for this condition depends on the cause of the anemia.      This information is not intended to replace advice given to you by your health care provider. Make sure you discuss any questions you have with your health care provider.

## 2022-06-02 NOTE — ED CDU PROVIDER INITIAL DAY NOTE - PROGRESS NOTE DETAILS
received signout from Dr. Yoder - pt in obs for transfusion; pt with hx of mds ; sent to ED for blood tx; pt consented; no active bleeding/melena/brbpr; will tx and d/c home; called by Rn for hyperglycemia - [id number]; pt took am meds at home; missed pm meds; takes glimeperide/actos ? unsure of dose; will given insulin and pt to resume meds at home in am;

## 2022-06-02 NOTE — ED PEDIATRIC NURSE REASSESSMENT NOTE - COMFORT CARE
placed in hospital bed/darkened lights/po fluids offered/repositioned/side rails up/warm blanket provided

## 2022-06-02 NOTE — ED PROVIDER NOTE - CLINICAL SUMMARY MEDICAL DECISION MAKING FREE TEXT BOX
patient evaluated for anemia, hgb noted to b e 5.3 2/2 to mds, patient to be placed in obs for transfusion

## 2022-06-02 NOTE — ED ADULT NURSE NOTE - OBJECTIVE STATEMENT
Sent to ED by MD for hemoglobin of 6.4. Reports weakness & fatigue. Reports previous hx of blood transfusions. Denies cp, sob, vomiting, diarrhea, blood in urine or stool.

## 2022-06-02 NOTE — ED PROVIDER NOTE - PHYSICAL EXAMINATION
CONSTITUTIONAL:  in no acute distress.   SKIN: warm, dry  HEAD: Normocephalic; atraumatic.  EYES: no conjunctival injection  ENT: No nasal discharge  NECK: Supple  CARD: S1, S2 normal; Regular rate and rhythm.   RESP: No wheezes, rales or rhonchi.  ABD: soft ntnd  EXT: Normal ROM.  No clubbing, cyanosis or edema.   NEURO: Alert, oriented, grossly unremarkable.  PSYCH: Cooperative, appropriate.

## 2022-06-02 NOTE — ED CDU PROVIDER INITIAL DAY NOTE - NS ED ATTENDING STATEMENT MOD
This was a shared visit with the DARIO. I reviewed and verified the documentation and independently performed the documented:

## 2022-06-02 NOTE — ED PROVIDER NOTE - OBJECTIVE STATEMENT
76 yo female, PMHx of DM, MDS, anemia with retacrit injections and near monthly blood transfusions, presents from [facility] with anemia. Patient state she has been a little more fatigued as of lately. Denies fevers, chills, chest pain, shortness of breath, abdominal pain, hematemesis, melena, headache, dizziness.

## 2022-06-02 NOTE — ED ADULT NURSE NOTE - NSIMPLEMENTINTERV_GEN_ALL_ED
Implemented All Fall with Harm Risk Interventions:  [place] to call system. Call bell, personal items and telephone within reach. Instruct patient to call for assistance. Room bathroom lighting operational. Non-slip footwear when patient is off stretcher. Physically safe environment: no spills, clutter or unnecessary equipment. Stretcher in lowest position, wheels locked, appropriate side rails in place. Provide visual cue, wrist band, yellow gown, etc. Monitor gait and stability. Monitor for mental status changes and reorient to person, place, and time. Review medications for side effects contributing to fall risk. Reinforce activity limits and safety measures with patient and family. Provide visual clues: red socks.

## 2022-06-03 VITALS
DIASTOLIC BLOOD PRESSURE: 74 MMHG | SYSTOLIC BLOOD PRESSURE: 172 MMHG | RESPIRATION RATE: 18 BRPM | OXYGEN SATURATION: 100 % | TEMPERATURE: 99 F | HEART RATE: 87 BPM

## 2022-06-03 LAB
GLUCOSE BLDC GLUCOMTR-MCNC: 203 MG/DL — HIGH (ref 70–99)
HCT VFR BLD CALC: 19.3 %
HGB BLD-MCNC: 6.4 G/DL
MCHC RBC-ENTMCNC: 31.1 PG
MCHC RBC-ENTMCNC: 33.2 G/DL
MCV RBC AUTO: 93.7 FL
PLATELET # BLD AUTO: 290 K/UL
PMV BLD: 11.4 FL
RBC # BLD: 2.06 M/UL
RBC # FLD: 18.7 %
WBC # FLD AUTO: 7.51 K/UL

## 2022-06-03 PROCEDURE — 99217: CPT

## 2022-06-03 RX ADMIN — INSULIN HUMAN 10 UNIT(S): 100 INJECTION, SOLUTION SUBCUTANEOUS at 00:22

## 2022-06-03 NOTE — ED CDU PROVIDER DISPOSITION NOTE - PATIENT PORTAL LINK FT
You can access the FollowMyHealth Patient Portal offered by [facility] by registering at the following website: [email or web address]. By joining [facility]’s FollowMyHealth portal, you will also be able to view your health information using other applications (apps) compatible with our system.

## 2022-06-03 NOTE — ED CDU PROVIDER DISPOSITION NOTE - CARE PROVIDER_API CALL
ERNESTO SIEGEL  Internal Medicine  [address] [place] [address]  Phone: [phone]  Fax: ()-  Follow Up Time: 1-3 Days

## 2022-06-03 NOTE — ED CDU PROVIDER DISPOSITION NOTE - CLINICAL COURSE
pt placed in EDOU for transfusion protocol due to MDS/chronic anemia. pt received 2 units PRBCs without issue. Advised to follow up with PMD/hematologist for reevaluation.

## 2022-06-03 NOTE — ED CDU PROVIDER DISPOSITION NOTE - NSFOLLOWUPINSTRUCTIONS_ED_ALL_ED_FT
Blood Transfusion    WHAT YOU NEED TO KNOW:    A blood transfusion is used to give you blood through an IV. You may get only part of the blood, such as red blood cells, platelets, or plasma. The blood may be from you and stored for you to use later. The blood may instead be from another person. Donated blood is tested for HIV, hepatitis, syphilis, West Nile virus, and other diseases.    DISCHARGE INSTRUCTIONS:    Call 911 for any of the following:     You have a skin rash, hives, swelling, or itching.       You have trouble breathing, shortness of breath, wheezing, or coughing.      Your throat tightens or your lips or tongue swell.      You have difficulty swallowing or speaking.    Seek care immediately if:     You develop a high fever and chills.       You are dizzy, lightheaded, confused, or feel like you are going to faint.      You have nausea, diarrhea, or abdominal cramps, or you are vomiting.      You urinate little or not at all.      You develop headaches or double vision.      Your skin or the whites of your eyes look yellow.      You see pinpoint purple spots or purple patches on your body.       You have a seizure.     Contact your healthcare provider if:     You feel tired and weak within 10 days of your transfusion.      You have questions or concerns about blood transfusions.    Medicines:     Antihistamines may help stop mild itching or a rash.      Epinephrine is emergency medicine used to stop anaphylaxis. You may be given epinephrine if you are at risk for anaphylaxis. Your healthcare provider will teach you how to use it.      Take your medicine as directed. Contact your healthcare provider if you think your medicine is not helping or if you have side effects. Tell him or her if you are allergic to any medicine. Keep a list of the medicines, vitamins, and herbs you take. Include the amounts, and when and why you take them. Bring the list or the pill bottles to follow-up visits. Carry your medicine list with you in case of an emergency.    Apply ice to decrease pain and swelling. Use an ice pack, or put ice in a plastic bag and wrap a towel around it. Apply the ice pack or wrapped bag to your transfusion site for 20 minutes each hour or as directed.     Follow up with your healthcare provider as directed: Write down your questions so you remember to ask them during your visits.       © Copyright [employer] 2019 All illustrations and images included in CareNotes are the copyrighted property of [employer] or [employer].        Anemia    Anemia is a condition in which the concentration of red blood cells or hemoglobin in the blood is below normal. Hemoglobin is a substance in red blood cells that carries oxygen to the tissues of the body. Anemia results in not enough oxygen reaching these tissues which can cause symptoms such as weakness, dizziness/lightheadedness, shortness of breath, chest pain, paleness, or nausea.    SEEK IMMEDIATE MEDICAL CARE IF YOU HAVE THE FOLLOWING SYMPTOMS: extreme weakness/chest pain/shortness of breath, black or bloody stools, vomiting blood, fainting, fever, or any signs of dehydration.

## 2022-06-09 ENCOUNTER — APPOINTMENT (OUTPATIENT)
Dept: INFUSION THERAPY | Facility: CLINIC | Age: 77
End: 2022-06-09

## 2022-06-09 NOTE — ED PROVIDER NOTE - DATE/TIME 1
PACU ANESTHESIA ADMISSION NOTE      Procedure: egd with bravo  Post op diagnosis:      ____  Intubated  TV:______       Rate: ______      FiO2: ______    _x___  Patent Airway    _x___  Full return of protective reflexes    _x___  Full recovery from anesthesia / back to baseline status    Vitals:            T:  n/a               BP : 93/51               R: 18             Sat:     100          P:61      Mental Status:  _x___ Awake   _____ Alert   _____ Drowsy   _____ Sedated    Nausea/Vomiting:  _x___  NO       ______Yes,   See Post - Op Orders         Pain Scale (0-10):  __0___    Treatment: _x___ None    ____ See Post - Op/PCA Orders    Post - Operative Fluids:   __x__ Oral   ____ See Post - Op Orders    Plan: Discharge:   _x___Home       _____Floor     _____Critical Care    _____  Other:_________________    Comments:  No anesthesia issues or complications noted.  Discharge when criteria met.
18-Nov-2021 23:24

## 2022-06-10 ENCOUNTER — APPOINTMENT (OUTPATIENT)
Dept: INFUSION THERAPY | Facility: CLINIC | Age: 77
End: 2022-06-10

## 2022-06-15 ENCOUNTER — LABORATORY RESULT (OUTPATIENT)
Age: 77
End: 2022-06-15

## 2022-06-15 ENCOUNTER — APPOINTMENT (OUTPATIENT)
Dept: INFUSION THERAPY | Facility: CLINIC | Age: 77
End: 2022-06-15

## 2022-06-15 RX ORDER — ERYTHROPOIETIN 10000 [IU]/ML
80000 INJECTION, SOLUTION INTRAVENOUS; SUBCUTANEOUS ONCE
Refills: 0 | Status: COMPLETED | OUTPATIENT
Start: 2022-06-15 | End: 2022-06-15

## 2022-06-15 RX ADMIN — ERYTHROPOIETIN 80000 UNIT(S): 10000 INJECTION, SOLUTION INTRAVENOUS; SUBCUTANEOUS at 13:30

## 2022-06-16 LAB
HCT VFR BLD CALC: 23.6 %
HGB BLD-MCNC: 8 G/DL
MCHC RBC-ENTMCNC: 32 PG
MCHC RBC-ENTMCNC: 33.9 G/DL
MCV RBC AUTO: 94.4 FL
PLATELET # BLD AUTO: 541 K/UL
PMV BLD: 9.4 FL
RBC # BLD: 2.5 M/UL
RBC # FLD: 16.1 %
WBC # FLD AUTO: 5.6 K/UL

## 2022-06-17 ENCOUNTER — APPOINTMENT (OUTPATIENT)
Dept: INFUSION THERAPY | Facility: CLINIC | Age: 77
End: 2022-06-17

## 2022-06-23 ENCOUNTER — APPOINTMENT (OUTPATIENT)
Dept: HEMATOLOGY ONCOLOGY | Facility: CLINIC | Age: 77
End: 2022-06-23
Payer: MEDICARE

## 2022-06-23 ENCOUNTER — APPOINTMENT (OUTPATIENT)
Dept: INFUSION THERAPY | Facility: CLINIC | Age: 77
End: 2022-06-23
Payer: MEDICARE

## 2022-06-23 ENCOUNTER — LABORATORY RESULT (OUTPATIENT)
Age: 77
End: 2022-06-23

## 2022-06-23 VITALS
SYSTOLIC BLOOD PRESSURE: 151 MMHG | DIASTOLIC BLOOD PRESSURE: 68 MMHG | BODY MASS INDEX: 25.03 KG/M2 | HEIGHT: 62 IN | TEMPERATURE: 97.7 F | WEIGHT: 136 LBS | RESPIRATION RATE: 16 BRPM | HEART RATE: 103 BPM

## 2022-06-23 LAB
HCT VFR BLD CALC: 23.2 %
HGB BLD-MCNC: 7.6 G/DL
MCHC RBC-ENTMCNC: 32.2 PG
MCHC RBC-ENTMCNC: 32.8 G/DL
MCV RBC AUTO: 98.3 FL
PLATELET # BLD AUTO: 515 K/UL
PMV BLD: 10.2 FL
RBC # BLD: 2.36 M/UL
RBC # FLD: 16.6 %
WBC # FLD AUTO: 9.37 K/UL

## 2022-06-23 PROCEDURE — 99214 OFFICE O/P EST MOD 30 MIN: CPT

## 2022-06-23 RX ORDER — LUSPATERCEPT 75 MG/1
110 INJECTION, POWDER, LYOPHILIZED, FOR SOLUTION SUBCUTANEOUS ONCE
Refills: 0 | Status: COMPLETED | OUTPATIENT
Start: 2022-06-23 | End: 2022-06-23

## 2022-06-23 RX ORDER — ERYTHROPOIETIN 10000 [IU]/ML
80000 INJECTION, SOLUTION INTRAVENOUS; SUBCUTANEOUS ONCE
Refills: 0 | Status: COMPLETED | OUTPATIENT
Start: 2022-06-23 | End: 2022-06-23

## 2022-06-23 RX ADMIN — LUSPATERCEPT 110 MILLIGRAM(S): 75 INJECTION, POWDER, LYOPHILIZED, FOR SOLUTION SUBCUTANEOUS at 13:10

## 2022-06-23 RX ADMIN — ERYTHROPOIETIN 80000 UNIT(S): 10000 INJECTION, SOLUTION INTRAVENOUS; SUBCUTANEOUS at 13:10

## 2022-06-24 ENCOUNTER — APPOINTMENT (OUTPATIENT)
Dept: INFUSION THERAPY | Facility: CLINIC | Age: 77
End: 2022-06-24

## 2022-06-24 NOTE — ASSESSMENT
[FreeTextEntry1] : Patient is a 77 year old female with # Lowrisk myelodysplastic syndrome, previously treated with Revlimid and Procrit and Vidaza . 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 \par Patient is s/p  9 cycles of Vidaza and she was requiring pRBC transfusion every 3 weeks \par Given the persistent transfusion dependence she was switched to Luspatercept.\par \par \par DETECTED GENOMIC ALTERATIONS:\par Tier III: Variants of Unknown Clinical Significance\par SF3B1 [id number]\par Hyperkalemia likely due to increased platelet; pseudohyperkalemia.\par \par PLAN:\par -- Continue Luspatercept 1.75 mg/kg every 3 weeks, \par --Continue Retacrit (Procrit)  80,000 units every week.\par Side effects of Procrit discussed including but not limited to: hypertension, headache, pruritus, nausea, vomiting, injection site pain, arthralgia, cough, fever.\par -- Monitor CBC weekly and administer 1 PRBC unit  as needed to keep Hgb >7 gm/dL.  Pt has e/o iron overload so transfusions need to be kept at a minimum if possible\par \par # Vitamin  B 12 deficiency\par -- Continue Vitamin B 12 injection every month\par \par # Thrombocytosis\par -- S/P Hydrea\par -- Will continue to monitor.\par \par rslts of  BM biopsy d/w pt. No e/o increased blasts. Pt is needing PRBCs almost every 3 weeks. Advised to follow up with Dr Ferrell to consider any other option or clinical trial\par Labs reviewed, \par Called Dr Ferrell's office, waiting for response\par RTC in 3 weeks \par \par \par \par

## 2022-06-24 NOTE — PHYSICAL EXAM
[Restricted in physically strenuous activity but ambulatory and able to carry out work of a light or sedentary nature] : Status 1- Restricted in physically strenuous activity but ambulatory and able to carry out work of a light or sedentary nature, e.g., light house work, office work [Normal] : affect appropriate [de-identified] : pallor

## 2022-06-24 NOTE — HISTORY OF PRESENT ILLNESS
[de-identified] : She missed on appointment for procrit last week.\par She starts her next cycle on 6/25/18\par C/o back pain radiating down her left which occurred when she bent to  something.\par No change in diarrhea.\par \par 7/31/18:\par Doing well. On Revlimid for more than 2yrs, 5 mg 2 weeks on 1 week off. She will be starting week on tonight. \par C/o diarrhea. On Imodium 2 pills AM and 2 pills PM. Doesn't help much with diarrhea. \par C/o nausea, abdominal pain. \par \par \par Colonoscopy in 2016 was normal. \par Did not have blood transfusion for over 2 years. \par On procrit [id number] weekly. Missed last week on 7/24/18 as she was out of town. She has been non compliant with weekly Procrit.\par Mammogram in 2017 was normal. \par \par 9/11/18\par pt is here for follow up.\par She feels the lomotil helps with the diarrhea.\par She was away for a few weeks and missed her procrit injections.\par No fever, abdominal  discomfort has been less since since use of lomotil.\par She started a new cycle 2 days ago.\par \par 10/2/18:\par She will start Revlimid tonight (week on). 2 weeks on, 1 week off. \par On ASA 81mg. \par Denies fever, nausea, vomiting, chest pain, SOB, abdominal pain, and bladder problems.\par C/o diarrhea. \par S/p 2 units PRBC transfusion on 9/25/18. \par On Procrit 59759 units weekly. Not compliant every week. \par C/o intermittent dizziness. \par \par 10/31/18\par Pt is here for follow up.\par C/O significant fatigue.\par Continues to have diarrhea, takes imodium and lomotil as needed.\par C/o dry cough, no fever.\par Cough started a month ago. It is worse in the mornings however over last week it has been constant all day.\par No chest pain.\par She was found to have low B12 levels and was started on B12 injections.\par \par 11/27/18:\par Doing well. Hospitalized for 3 days for cellulitis/abcess of the back s/p drainage and on Abx currently. Developed 3 days after Mg infusion as per pt. \par Denies nausea, vomiting, chest pain, SOB, abdominal pain, bowel and bladder problems.\par This is the week on Revlimid (week 1).\par S/p 1 unit PRBC transfusion in the hospital. \par On Procrit weekly \par \par 12/27/18:\par Doing well. No major complaints.\par Denies fever, nausea, vomiting, chest pain, SOB, abdominal pain, and bladder problems.\par On Revlimid 5 mg 2 weeks on 1 week off. This is the week off. She will start the week on sunday (12/30/18).\par Due for Procrit 57035 units today. \par Still has diarrhea. 4-5bm/day.\par On monthly B12 injections. \par \par 1/30/19:\par Patient here for follow up for MDS.  She is taking Revlimid 5 mg, 2 weeks on, 1 week off.  She will complete this current cycle on Saturday.  She has no new complaints today.  Patient denies cough, shortness of breath, denies fever, denies bone pain.\par \par 03/04/2019\par Patient is here for a follow up visit. She complaints of severe pain in her left shoulder and has had abscess drained 2 weeks ago. However the pain is worse and she has purulent discharge on examination. She also has Nasal sores that are painful. Culture from 11/2018 showed MRSA.  Denies Fever. \par She also complaints of swelling in her right leg. Not tender and not erythematous and negative Gong;s sign. \par Her diarrhea with Revlimid is ongoing and Imodium and Lomotil helps but not everyday. \par She is on 60,000 units of procrit weekly and Revlimid 5 mg 2 weeks on 1 week off. \par \par 4/23/19\par Pt is here for follow up.\par She feels well.\par The nasal sores have improved with bactroban\par The abscess on left shoulder has resolved.\par However she has a new one on the middle of her back.\par No fever.\par Pt has been on procrit 56243 units weekly, however she missed a dose in between.\par \par 5/8/19\par pt is here for follow up.\par no new complaints.\par feels well.\par Her skin abscess has resolved.\par she has been unable to go to ID, as she could not get an appt.\par No bleeding.\par She is compliant with revlimid.\par \par 6/6/19\par Pt is here for follow up.\par no new complaints \par Feels well.\par Is on week 2 of her Revlimid cycle. \par No transfusions since last visit\par \par 7/17/19\par Pt is here for follow up.\par C/O fatigue.\par Was away for a few days two weeks ago, but missed treatment with procrit for 2 weeks.\par Is complaint with Revlimid 2 weeks on 1 week off.\par Diarrhea is a little improved.\par \par 8/14/19\par Patient here for follow up visit, feeling well.  Although she is complaining of some pain at the left scapula area recently.  Patient denies cough, shortness of breath, denies fever, denies other bone pain.  She is off Revlimid  this week, due to restart on Monday.  She is scheduled for Procrit and Vitamin B12 injection today.  She plans to leave the country tomorrow to visit her mother who is ill.  She will return in about 10 days.\par \par 9/11/19\par Pt is here for follow up.\par She was away for 3 weeks, out of which she took procrit for 2 weeks in [place].\par She missed last week's dose.\par She had CBCs in [place] which showed Hgb of 8.9\par She feels well.\par She still getting diarrhea.\par She has been using lomotil with very minimal relief.\par She started a new cycle of Revlimid 4 days ago.\par \par 10/3/19\par Patient here for follow up visit, feeling fairly well.   Patient denies cough, shortness of breath, denies fever, denies other bone pain.  She remains on Revlimid.  She is scheduled for Procrit and Vitamin B12 injection today.\par \par 10/24/19\par Pt is here for follow up.\par Feels well.\par No SOB, fatigue.\par Compliant with procrit and Revl;imid.\par Remains on ASa.\par Diarrhea is unchanged.\par Pt takes imodium as needed.\par \par 11/14/19\par Pt is here for follow up.\par No new complaints.\par Starts a new cycle of Revlimid today.\par Diarrhea is same as before, no rectal bleeding.\par No fever.\par \par 12/5/19\par Pt is here for follow up.\par No new complaints.\par Denies feeling weaker than usual.\par No fever, SOB.\par No change in frequency of diarrhea.\par No pain.\par Will start next cycle of Revlimid in 3 days.\par \par \par !/16/20\par Pt was recently DCed from [facility] 3 days ago after being treated for pneumonia and MARCO.\par She is on po Levaquin.\par She restarted Revlmid 3 days ago.\par She is feeling better now. No fever.\par No SOB, Chest pain and back pain has resolved.\par Pt has a cough, no expectoration.\par She also recd a unit of PRBCs last week in the hospital\par \par \par 1/30/20\par Patient here for follow up visit, feeling well.  She has no new complaints. Cough is almost gone completely.  Patient denies shortness of breath, denies fever, denies bone pain.  Her appetite is ok, weight is stable.  She is due to restart Revlimid on Monday.\par \par 02/20/20\par Pt is here for follow up, feeling well.\par Offers no new complaints. Denies SOB, fever, chills, weight loss, CP, cough. \par Currently off week of Revlimid 5 mg. Restarts on Monday.\par Has procrit 80,000 units today. Next b 12 injection due in 2 weeks \par Did not get chest Xray\par CBC reviewed WBC 3.1, h/h 8.3/25%, plt 386, neutrophils 1.29\par \par 3/12/20\par Pt is here for follow up.\par She took Revlimid for 2 weeks and then has been off for one week although she was advised to take it daily without break.\par No SOB, Cough, fever.\par Has mild fatigue.\par \par 04/02/2020\par SIMONA KUHN a 75 year F is here today for follow up visit of MDS.\par Denies fever, chills, cough,night sweats, weight loss. \par Denies bleeding or bruising.\par Pt receives procrit 80,000 units weekly and B12 IM monthly. \par Continued Revlimid 5 mg daily x 3 weeks, has 1 dose left tonight. \par CBC reviewed: WBC 3.2, H/H 8.1/25.2, neutrophils 1.6, PLT 72\par \par 4/20/2020: The patient is here for follow up . She has no complaints of fever, chills, dizziness , chest pain and shortness of breath . She is still with diarrhea , up to 10 watery BMs per day, responds poorly to imodium and lomotil. She is on Revlimid 5 mg daily , took last dose yesterday night. Her last Procrit was on April 13. \par \par 5/26/20\par Pt is here for follow up.\par She is feeling well. Denies SOB, chest pain, fever.\par Continues to have diarrhea although she is off of Revlmid.\par Thinks it may due to diabetic meds.\par Wants to discuss BM bx results\par \par 6/22/20\par Pt is here for follow up.\par Feels well. Denies any fever, N, V, D\par Continues to have diarrhea, she will talk to her PCP about changing metformin for DM.\par Has been needing PRBCs 1 unit each month\par \par 7/20/20\par Pt is here for follow up.\par No new complaints. Has been feeling well.\par No SOB, CP. \par No N, V, D.\par She has recd 2 units of PRBCs since May 20.\par \par 8/17/20\par Pt is here for a follow up visit.\par She is feeling well.\par No new symptoms. No N, V, D.\par No bruising or bleeding. She continues to need PRBCs every 2-3 weeks.\par \par 8/31/20\par Pt is here for follow up. She is feeling well. No N, V, D.\par She is tolerating the hydrea well. No SOB, CP\par No bruising ior bleeding\par \par 9/8/20\par Pt is here for follow up.\par She had been contacted by the [facility] last week to advise her to stop the hydrea. Pt did not check her messages and continued with Hydrea.\par No fever, N, V, bleeding.\par Saw Dr Ferrell last week.\par \par 9/14/20\par Pt is here for folow up.\par Besides her usual complaint of diarrhea she is feeling well.\par Recd 1 unit PRBC last week.\par Has stopped Hydrea.\par No fever, mouth sores.\par \par 9/29/20\par Pt is here for folow up.\par No new complaints.\par Is seeing GI for diarrhea net week.\par No fever, night sweats.\par REcd 3 units of PRBC this month\par \par 10/13/20\par Pt is here for follow up.\par No new complaints.\par Has not needed a transfusion since 3 weeks ago.\par Continues to have diarrhea, waiting to be seen by GI\par \par 10/26/20\par Pt is here for follow up.\par C/O feeling fatigued.\par Has CLARK.\par No nausea or vomiting.\par No fever.\par Diarrhea has improved a little. She is no longer on Metformin\par \par 11/9/20\par Pt is here for follow up.\par Feels well. Denies SOB, CP, fatigue\par \par 12/7/20- Patient is for follow up and is due for cycle 9 of Vidaza.  She still has loose BM sometimes 10 times a day and was seeing Dr. Corey from GI- did not follow up recently and is unable to get an appt with him. She has lost about 10 lbs since September. No N/V. No fever or chills. No CP/SOB. She has been getting PRBC transfusion every 3 weeks now\par \par 01/04/20 Pt presented today for f/u visit . She is s/p 8 cycles of vidaza and was started on Luspatercept in Dec , 2020 . So far she received 1 injection , she is due for 2nd injection today . Adverse effect profile was discussed with her in detail , she reports having some dizziness and weakness after first injection . She received blood transfusion last wk . Blood work from today reviewed as well and counts are adequate . She denies any fevers,  chills,  or any new symptoms . \par \par 1/25/21\par Pt is here for follow up.\par C/O diarrhea, otherwise feeling better.\par Has not needed a transfusion since 12/29\par \par 2/16/21\par Pt is here for follow up.\par Needed transfusion on 2/8/21.\par Continues to C/o fatigue. Diarrhea remains the same, has not made GI appt as yet.\par \par 3/8/21\par Pt is here for follow up.\par Feels better today. Recd 1 unit PRBCs last week.\par Diarrhea is same as before. has an appt with GI next week.\par No fever\par \par 3/30/21\par Pt is here for follow up.\par Feels better. Last transfusion was on 3/2/21\par Saw GI and was started on cholestyramine and metformin was DCED with resolution of diarrhea.\par She denies any SOB,\par Fatigue is better\par \par 4/20/2021\par Pt is here to f/u for MDS\par She is s/p Luspatercept cycle #6\par She is compliant with Aspirin\par She feels better today, appetite is good\par She denies shortness of breath, fatigue, or weakness \par She c/o of diarrhea but it has improved since she was started on Cholestyramine. Stool is soft and less in frequency\par She received COVID vaccine x 2 doses\par \par 5/11/21\par pt is here for follow up.\par Feels the same with fatigue, diarrhea.\par No SOB\par \par 6/21/21\par Pt is here for follow up.\par Feels well. No SOB, CP, N< V.\par Diarrhea is better controlled now.\par Last PRBC transfusion was 2 weeks ago.\par \par 7/19/21\par Pt is here for follow up.\par Feels a little tired, last transfusion was 6/1/21.\par No bleeding, fever, SOB\par \par 8/10/2021\par Patient is here to follow up for her MDS.\par She is on Luspatercept, tolerating well.\par She feels well today, the appetite is good.\par She denies shortness of breath, fatigue, fever, night sweats, abdominal pain, arthralgias/myalgias.\par \par 8/31/21\par Pt is here for follow up.\par C/O feeling very fatigued.\par No bleeding.\par No fever.\par \par 9/21/2021\par Patient is here to follow up for MDS.\par She is on Luspatercept and Retacrit, tolerating well.\par She gets blood transfusion as needed for Hgb <7 gm/dL\par She is c/o of fatigue, no shortness of breath, dizziness, chills or lightheadedness.\par She denies melena or hematochezia.\par Her appetite is good, no nausea/vomiting.\par \par 10/28/2021\par Patient is here to follow up for MDS.\par She is on Luspatercept and Retacrit, tolerating well.\par She gets blood transfusion to keep Hgb > 7 gm/dL.\par She is c/o of chronic fatigue, no shortness of breath, dizziness, chills or lightheadedness.\par She denies melena or hematochezia.\par Her appetite is good, no nausea/vomiting.\par She c/o of severe diarrhea, 10-12x/day watery stool while on Imodium in the last 2 weeks.\par Last colonoscopy was in 2016, benign as per patient.\par \par 11/18/21\par Pt is here for follow up. C/O fatigue. No SOB, CP\par \par 12/9/21\par Pt is here for follow up.\par Feels better today. Recd 2 units last week in ER.\par Planning to go to [place] for over a week and does not want to miss her procrit dose.\par Diarrhea is stable,\par \par 12/23/21\par Pt is here for follow up.\par Feels better. No SOB, CP. Going to [place] tomorrow. Has not been able to get Procrit injection from the pharmacy yet d/t insurance issues\par \par 2/3/22\par Pt is here for follow up. Feeling same as usual. No SOB, CP. Last transfusion was 2 weeks ago in ER>\par No bleeding reported\par \par 3/3/22\par Pt is here for follow up.\par C/O fatigue. Had black stools X2\par \par 3/30/22\par Pt is feeling well.\par Here for treatment and BM biopsy\par \par 4/21/22\par Pt is here fir follow up.\par Was in ER last week. Was give 2 units of PRBCs.\par Pt feels less tired today.\par Wants to discuss BM rslts\par \par 5/12/22\par Pt is here today for follow up visit.\par Pt c/o feeling tired.  Hgb=6.9.  One unit transfusion scheduled.\par \par 6/23/22\par Pt is here for follow up. Feels well today. No SOB, chest pain.\par Has not made appt with Dr ferrell\par  [de-identified] : \par

## 2022-06-30 ENCOUNTER — LABORATORY RESULT (OUTPATIENT)
Age: 77
End: 2022-06-30

## 2022-06-30 ENCOUNTER — APPOINTMENT (OUTPATIENT)
Dept: INFUSION THERAPY | Facility: CLINIC | Age: 77
End: 2022-06-30

## 2022-06-30 RX ORDER — PREGABALIN 225 MG/1
1000 CAPSULE ORAL ONCE
Refills: 0 | Status: COMPLETED | OUTPATIENT
Start: 2022-06-30 | End: 2022-06-30

## 2022-06-30 RX ORDER — ERYTHROPOIETIN 10000 [IU]/ML
80000 INJECTION, SOLUTION INTRAVENOUS; SUBCUTANEOUS ONCE
Refills: 0 | Status: COMPLETED | OUTPATIENT
Start: 2022-06-30 | End: 2022-06-30

## 2022-06-30 RX ADMIN — PREGABALIN 1000 MICROGRAM(S): 225 CAPSULE ORAL at 11:58

## 2022-06-30 RX ADMIN — ERYTHROPOIETIN 80000 UNIT(S): 10000 INJECTION, SOLUTION INTRAVENOUS; SUBCUTANEOUS at 11:58

## 2022-07-01 ENCOUNTER — APPOINTMENT (OUTPATIENT)
Dept: INFUSION THERAPY | Facility: CLINIC | Age: 77
End: 2022-07-01

## 2022-07-05 LAB
ABO + RH PNL BLD: NORMAL
BLD GP AB SCN SERPL QL: NORMAL
HCT VFR BLD CALC: 20.2 %
HGB BLD-MCNC: 6.8 G/DL
MCHC RBC-ENTMCNC: 32.1 PG
MCHC RBC-ENTMCNC: 33.7 G/DL
MCV RBC AUTO: 95.3 FL
PLATELET # BLD AUTO: 522 K/UL
PMV BLD: 10.1 FL
RBC # BLD: 2.12 M/UL
RBC # FLD: 17.3 %
WBC # FLD AUTO: 8.32 K/UL

## 2022-07-07 ENCOUNTER — APPOINTMENT (OUTPATIENT)
Dept: INFUSION THERAPY | Facility: CLINIC | Age: 77
End: 2022-07-07

## 2022-07-08 ENCOUNTER — APPOINTMENT (OUTPATIENT)
Dept: INFUSION THERAPY | Facility: CLINIC | Age: 77
End: 2022-07-08

## 2022-07-14 ENCOUNTER — APPOINTMENT (OUTPATIENT)
Dept: HEMATOLOGY ONCOLOGY | Facility: CLINIC | Age: 77
End: 2022-07-14

## 2022-07-14 ENCOUNTER — LABORATORY RESULT (OUTPATIENT)
Age: 77
End: 2022-07-14

## 2022-07-14 ENCOUNTER — APPOINTMENT (OUTPATIENT)
Dept: INFUSION THERAPY | Facility: CLINIC | Age: 77
End: 2022-07-14

## 2022-07-14 VITALS
HEART RATE: 115 BPM | WEIGHT: 135 LBS | HEIGHT: 62 IN | SYSTOLIC BLOOD PRESSURE: 145 MMHG | DIASTOLIC BLOOD PRESSURE: 61 MMHG | BODY MASS INDEX: 24.84 KG/M2 | TEMPERATURE: 97.9 F

## 2022-07-14 LAB
HCT VFR BLD CALC: 20.4 %
HGB BLD-MCNC: 6.8 G/DL
MCHC RBC-ENTMCNC: 31.5 PG
MCHC RBC-ENTMCNC: 33.3 G/DL
MCV RBC AUTO: 94.4 FL
PLATELET # BLD AUTO: 421 K/UL
PMV BLD: 9.6 FL
RBC # BLD: 2.16 M/UL
RBC # FLD: 17.5 %
WBC # FLD AUTO: 6.31 K/UL

## 2022-07-14 PROCEDURE — 99215 OFFICE O/P EST HI 40 MIN: CPT

## 2022-07-14 RX ORDER — LUSPATERCEPT 75 MG/1
110 INJECTION, POWDER, LYOPHILIZED, FOR SOLUTION SUBCUTANEOUS ONCE
Refills: 0 | Status: COMPLETED | OUTPATIENT
Start: 2022-07-14 | End: 2022-07-14

## 2022-07-14 RX ORDER — ERYTHROPOIETIN 10000 [IU]/ML
80000 INJECTION, SOLUTION INTRAVENOUS; SUBCUTANEOUS ONCE
Refills: 0 | Status: COMPLETED | OUTPATIENT
Start: 2022-07-14 | End: 2022-07-14

## 2022-07-14 RX ADMIN — LUSPATERCEPT 110 MILLIGRAM(S): 75 INJECTION, POWDER, LYOPHILIZED, FOR SOLUTION SUBCUTANEOUS at 14:06

## 2022-07-14 RX ADMIN — ERYTHROPOIETIN 80000 UNIT(S): 10000 INJECTION, SOLUTION INTRAVENOUS; SUBCUTANEOUS at 14:05

## 2022-07-15 ENCOUNTER — APPOINTMENT (OUTPATIENT)
Dept: INFUSION THERAPY | Facility: CLINIC | Age: 77
End: 2022-07-15

## 2022-07-15 LAB
ABO + RH PNL BLD: NORMAL
BLD GP AB SCN SERPL QL: NORMAL

## 2022-07-15 NOTE — ASSESSMENT
[FreeTextEntry1] : Patient is a 77 year old female with # Lowrisk myelodysplastic syndrome, previously treated with Revlimid and Procrit and Vidaza . 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  Pt has e/o iron overload so transfusions need to be kept at a minimum if possible\par \par Transfusion arranged for tomorrow\par \par # Vitamin  B 12 deficiency\par -- Continue Vitamin B 12 injection every month\par \par # Thrombocytosis\par -- S/P Hydrea\par -- Will continue to monitor.\par \par rslts of  BM biopsy d/w pt. No e/o increased blasts. Pt is needing PRBCs almost every 3 weeks. Advised to follow up with Dr Ferrell to consider any other option or clinical trial\par Labs reviewed, \par Called Dr Ferrell's office again, waiting for response\par RTC in 3 weeks \par \par \par \par

## 2022-07-15 NOTE — PHYSICAL EXAM
[Restricted in physically strenuous activity but ambulatory and able to carry out work of a light or sedentary nature] : Status 1- Restricted in physically strenuous activity but ambulatory and able to carry out work of a light or sedentary nature, e.g., light house work, office work [Normal] : affect appropriate [de-identified] : pallor

## 2022-07-15 NOTE — HISTORY OF PRESENT ILLNESS
[de-identified] : She missed on appointment for procrit last week.\par She starts her next cycle on 6/25/18\par C/o back pain radiating down her left which occurred when she bent to  something.\par No change in diarrhea.\par \par 7/31/18:\par Doing well. On Revlimid for more than 2yrs, 5 mg 2 weeks on 1 week off. She will be starting week on tonight. \par C/o diarrhea. On Imodium 2 pills AM and 2 pills PM. Doesn't help much with diarrhea. \par C/o nausea, abdominal pain. \par \par \par Colonoscopy in 2016 was normal. \par Did not have blood transfusion for over 2 years. \par On procrit [id number] weekly. Missed last week on 7/24/18 as she was out of town. She has been non compliant with weekly Procrit.\par Mammogram in 2017 was normal. \par \par 9/11/18\par pt is here for follow up.\par She feels the lomotil helps with the diarrhea.\par She was away for a few weeks and missed her procrit injections.\par No fever, abdominal  discomfort has been less since since use of lomotil.\par She started a new cycle 2 days ago.\par \par 10/2/18:\par She will start Revlimid tonight (week on). 2 weeks on, 1 week off. \par On ASA 81mg. \par Denies fever, nausea, vomiting, chest pain, SOB, abdominal pain, and bladder problems.\par C/o diarrhea. \par S/p 2 units PRBC transfusion on 9/25/18. \par On Procrit 94045 units weekly. Not compliant every week. \par C/o intermittent dizziness. \par \par 10/31/18\par Pt is here for follow up.\par C/O significant fatigue.\par Continues to have diarrhea, takes imodium and lomotil as needed.\par C/o dry cough, no fever.\par Cough started a month ago. It is worse in the mornings however over last week it has been constant all day.\par No chest pain.\par She was found to have low B12 levels and was started on B12 injections.\par \par 11/27/18:\par Doing well. Hospitalized for 3 days for cellulitis/abcess of the back s/p drainage and on Abx currently. Developed 3 days after Mg infusion as per pt. \par Denies nausea, vomiting, chest pain, SOB, abdominal pain, bowel and bladder problems.\par This is the week on Revlimid (week 1).\par S/p 1 unit PRBC transfusion in the hospital. \par On Procrit weekly \par \par 12/27/18:\par Doing well. No major complaints.\par Denies fever, nausea, vomiting, chest pain, SOB, abdominal pain, and bladder problems.\par On Revlimid 5 mg 2 weeks on 1 week off. This is the week off. She will start the week on sunday (12/30/18).\par Due for Procrit 44862 units today. \par Still has diarrhea. 4-5bm/day.\par On monthly B12 injections. \par \par 1/30/19:\par Patient here for follow up for MDS.  She is taking Revlimid 5 mg, 2 weeks on, 1 week off.  She will complete this current cycle on Saturday.  She has no new complaints today.  Patient denies cough, shortness of breath, denies fever, denies bone pain.\par \par 03/04/2019\par Patient is here for a follow up visit. She complaints of severe pain in her left shoulder and has had abscess drained 2 weeks ago. However the pain is worse and she has purulent discharge on examination. She also has Nasal sores that are painful. Culture from 11/2018 showed MRSA.  Denies Fever. \par She also complaints of swelling in her right leg. Not tender and not erythematous and negative Gong;s sign. \par Her diarrhea with Revlimid is ongoing and Imodium and Lomotil helps but not everyday. \par She is on 60,000 units of procrit weekly and Revlimid 5 mg 2 weeks on 1 week off. \par \par 4/23/19\par Pt is here for follow up.\par She feels well.\par The nasal sores have improved with bactroban\par The abscess on left shoulder has resolved.\par However she has a new one on the middle of her back.\par No fever.\par Pt has been on procrit 78819 units weekly, however she missed a dose in between.\par \par 5/8/19\par pt is here for follow up.\par no new complaints.\par feels well.\par Her skin abscess has resolved.\par she has been unable to go to ID, as she could not get an appt.\par No bleeding.\par She is compliant with revlimid.\par \par 6/6/19\par Pt is here for follow up.\par no new complaints \par Feels well.\par Is on week 2 of her Revlimid cycle. \par No transfusions since last visit\par \par 7/17/19\par Pt is here for follow up.\par C/O fatigue.\par Was away for a few days two weeks ago, but missed treatment with procrit for 2 weeks.\par Is complaint with Revlimid 2 weeks on 1 week off.\par Diarrhea is a little improved.\par \par 8/14/19\par Patient here for follow up visit, feeling well.  Although she is complaining of some pain at the left scapula area recently.  Patient denies cough, shortness of breath, denies fever, denies other bone pain.  She is off Revlimid  this week, due to restart on Monday.  She is scheduled for Procrit and Vitamin B12 injection today.  She plans to leave the country tomorrow to visit her mother who is ill.  She will return in about 10 days.\par \par 9/11/19\par Pt is here for follow up.\par She was away for 3 weeks, out of which she took procrit for 2 weeks in [place].\par She missed last week's dose.\par She had CBCs in [place] which showed Hgb of 8.9\par She feels well.\par She still getting diarrhea.\par She has been using lomotil with very minimal relief.\par She started a new cycle of Revlimid 4 days ago.\par \par 10/3/19\par Patient here for follow up visit, feeling fairly well.   Patient denies cough, shortness of breath, denies fever, denies other bone pain.  She remains on Revlimid.  She is scheduled for Procrit and Vitamin B12 injection today.\par \par 10/24/19\par Pt is here for follow up.\par Feels well.\par No SOB, fatigue.\par Compliant with procrit and Revl;imid.\par Remains on ASa.\par Diarrhea is unchanged.\par Pt takes imodium as needed.\par \par 11/14/19\par Pt is here for follow up.\par No new complaints.\par Starts a new cycle of Revlimid today.\par Diarrhea is same as before, no rectal bleeding.\par No fever.\par \par 12/5/19\par Pt is here for follow up.\par No new complaints.\par Denies feeling weaker than usual.\par No fever, SOB.\par No change in frequency of diarrhea.\par No pain.\par Will start next cycle of Revlimid in 3 days.\par \par \par !/16/20\par Pt was recently DCed from [facility] 3 days ago after being treated for pneumonia and MARCO.\par She is on po Levaquin.\par She restarted Revlmid 3 days ago.\par She is feeling better now. No fever.\par No SOB, Chest pain and back pain has resolved.\par Pt has a cough, no expectoration.\par She also recd a unit of PRBCs last week in the hospital\par \par \par 1/30/20\par Patient here for follow up visit, feeling well.  She has no new complaints. Cough is almost gone completely.  Patient denies shortness of breath, denies fever, denies bone pain.  Her appetite is ok, weight is stable.  She is due to restart Revlimid on Monday.\par \par 02/20/20\par Pt is here for follow up, feeling well.\par Offers no new complaints. Denies SOB, fever, chills, weight loss, CP, cough. \par Currently off week of Revlimid 5 mg. Restarts on Monday.\par Has procrit 80,000 units today. Next b 12 injection due in 2 weeks \par Did not get chest Xray\par CBC reviewed WBC 3.1, h/h 8.3/25%, plt 386, neutrophils 1.29\par \par 3/12/20\par Pt is here for follow up.\par She took Revlimid for 2 weeks and then has been off for one week although she was advised to take it daily without break.\par No SOB, Cough, fever.\par Has mild fatigue.\par \par 04/02/2020\par SIMONA KUHN a 75 year F is here today for follow up visit of MDS.\par Denies fever, chills, cough,night sweats, weight loss. \par Denies bleeding or bruising.\par Pt receives procrit 80,000 units weekly and B12 IM monthly. \par Continued Revlimid 5 mg daily x 3 weeks, has 1 dose left tonight. \par CBC reviewed: WBC 3.2, H/H 8.1/25.2, neutrophils 1.6, PLT 72\par \par 4/20/2020: The patient is here for follow up . She has no complaints of fever, chills, dizziness , chest pain and shortness of breath . She is still with diarrhea , up to 10 watery BMs per day, responds poorly to imodium and lomotil. She is on Revlimid 5 mg daily , took last dose yesterday night. Her last Procrit was on April 13. \par \par 5/26/20\par Pt is here for follow up.\par She is feeling well. Denies SOB, chest pain, fever.\par Continues to have diarrhea although she is off of Revlmid.\par Thinks it may due to diabetic meds.\par Wants to discuss BM bx results\par \par 6/22/20\par Pt is here for follow up.\par Feels well. Denies any fever, N, V, D\par Continues to have diarrhea, she will talk to her PCP about changing metformin for DM.\par Has been needing PRBCs 1 unit each month\par \par 7/20/20\par Pt is here for follow up.\par No new complaints. Has been feeling well.\par No SOB, CP. \par No N, V, D.\par She has recd 2 units of PRBCs since May 20.\par \par 8/17/20\par Pt is here for a follow up visit.\par She is feeling well.\par No new symptoms. No N, V, D.\par No bruising or bleeding. She continues to need PRBCs every 2-3 weeks.\par \par 8/31/20\par Pt is here for follow up. She is feeling well. No N, V, D.\par She is tolerating the hydrea well. No SOB, CP\par No bruising ior bleeding\par \par 9/8/20\par Pt is here for follow up.\par She had been contacted by the [facility] last week to advise her to stop the hydrea. Pt did not check her messages and continued with Hydrea.\par No fever, N, V, bleeding.\par Saw Dr Ferrell last week.\par \par 9/14/20\par Pt is here for folow up.\par Besides her usual complaint of diarrhea she is feeling well.\par Recd 1 unit PRBC last week.\par Has stopped Hydrea.\par No fever, mouth sores.\par \par 9/29/20\par Pt is here for folow up.\par No new complaints.\par Is seeing GI for diarrhea net week.\par No fever, night sweats.\par REcd 3 units of PRBC this month\par \par 10/13/20\par Pt is here for follow up.\par No new complaints.\par Has not needed a transfusion since 3 weeks ago.\par Continues to have diarrhea, waiting to be seen by GI\par \par 10/26/20\par Pt is here for follow up.\par C/O feeling fatigued.\par Has CLARK.\par No nausea or vomiting.\par No fever.\par Diarrhea has improved a little. She is no longer on Metformin\par \par 11/9/20\par Pt is here for follow up.\par Feels well. Denies SOB, CP, fatigue\par \par 12/7/20- Patient is for follow up and is due for cycle 9 of Vidaza.  She still has loose BM sometimes 10 times a day and was seeing Dr. Corey from GI- did not follow up recently and is unable to get an appt with him. She has lost about 10 lbs since September. No N/V. No fever or chills. No CP/SOB. She has been getting PRBC transfusion every 3 weeks now\par \par 01/04/20 Pt presented today for f/u visit . She is s/p 8 cycles of vidaza and was started on Luspatercept in Dec , 2020 . So far she received 1 injection , she is due for 2nd injection today . Adverse effect profile was discussed with her in detail , she reports having some dizziness and weakness after first injection . She received blood transfusion last wk . Blood work from today reviewed as well and counts are adequate . She denies any fevers,  chills,  or any new symptoms . \par \par 1/25/21\par Pt is here for follow up.\par C/O diarrhea, otherwise feeling better.\par Has not needed a transfusion since 12/29\par \par 2/16/21\par Pt is here for follow up.\par Needed transfusion on 2/8/21.\par Continues to C/o fatigue. Diarrhea remains the same, has not made GI appt as yet.\par \par 3/8/21\par Pt is here for follow up.\par Feels better today. Recd 1 unit PRBCs last week.\par Diarrhea is same as before. has an appt with GI next week.\par No fever\par \par 3/30/21\par Pt is here for follow up.\par Feels better. Last transfusion was on 3/2/21\par Saw GI and was started on cholestyramine and metformin was DCED with resolution of diarrhea.\par She denies any SOB,\par Fatigue is better\par \par 4/20/2021\par Pt is here to f/u for MDS\par She is s/p Luspatercept cycle #6\par She is compliant with Aspirin\par She feels better today, appetite is good\par She denies shortness of breath, fatigue, or weakness \par She c/o of diarrhea but it has improved since she was started on Cholestyramine. Stool is soft and less in frequency\par She received COVID vaccine x 2 doses\par \par 5/11/21\par pt is here for follow up.\par Feels the same with fatigue, diarrhea.\par No SOB\par \par 6/21/21\par Pt is here for follow up.\par Feels well. No SOB, CP, N< V.\par Diarrhea is better controlled now.\par Last PRBC transfusion was 2 weeks ago.\par \par 7/19/21\par Pt is here for follow up.\par Feels a little tired, last transfusion was 6/1/21.\par No bleeding, fever, SOB\par \par 8/10/2021\par Patient is here to follow up for her MDS.\par She is on Luspatercept, tolerating well.\par She feels well today, the appetite is good.\par She denies shortness of breath, fatigue, fever, night sweats, abdominal pain, arthralgias/myalgias.\par \par 8/31/21\par Pt is here for follow up.\par C/O feeling very fatigued.\par No bleeding.\par No fever.\par \par 9/21/2021\par Patient is here to follow up for MDS.\par She is on Luspatercept and Retacrit, tolerating well.\par She gets blood transfusion as needed for Hgb <7 gm/dL\par She is c/o of fatigue, no shortness of breath, dizziness, chills or lightheadedness.\par She denies melena or hematochezia.\par Her appetite is good, no nausea/vomiting.\par \par 10/28/2021\par Patient is here to follow up for MDS.\par She is on Luspatercept and Retacrit, tolerating well.\par She gets blood transfusion to keep Hgb > 7 gm/dL.\par She is c/o of chronic fatigue, no shortness of breath, dizziness, chills or lightheadedness.\par She denies melena or hematochezia.\par Her appetite is good, no nausea/vomiting.\par She c/o of severe diarrhea, 10-12x/day watery stool while on Imodium in the last 2 weeks.\par Last colonoscopy was in 2016, benign as per patient.\par \par 11/18/21\par Pt is here for follow up. C/O fatigue. No SOB, CP\par \par 12/9/21\par Pt is here for follow up.\par Feels better today. Recd 2 units last week in ER.\par Planning to go to [place] for over a week and does not want to miss her procrit dose.\par Diarrhea is stable,\par \par 12/23/21\par Pt is here for follow up.\par Feels better. No SOB, CP. Going to [place] tomorrow. Has not been able to get Procrit injection from the pharmacy yet d/t insurance issues\par \par 2/3/22\par Pt is here for follow up. Feeling same as usual. No SOB, CP. Last transfusion was 2 weeks ago in ER>\par No bleeding reported\par \par 3/3/22\par Pt is here for follow up.\par C/O fatigue. Had black stools X2\par \par 3/30/22\par Pt is feeling well.\par Here for treatment and BM biopsy\par \par 4/21/22\par Pt is here fir follow up.\par Was in ER last week. Was give 2 units of PRBCs.\par Pt feels less tired today.\par Wants to discuss BM rslts\par \par 5/12/22\par Pt is here today for follow up visit.\par Pt c/o feeling tired.  Hgb=6.9.  One unit transfusion scheduled.\par \par 6/23/22\par Pt is here for follow up. Feels well today. No SOB, chest pain.\par Has not made appt with [facility]\par \par 7/14/22\par Pt is here for follow up.\par She feels ok. No SOB, CP.\par \par \par  [de-identified] : \par

## 2022-07-21 ENCOUNTER — APPOINTMENT (OUTPATIENT)
Dept: INFUSION THERAPY | Facility: CLINIC | Age: 77
End: 2022-07-21

## 2022-07-21 ENCOUNTER — LABORATORY RESULT (OUTPATIENT)
Age: 77
End: 2022-07-21

## 2022-07-21 RX ORDER — ERYTHROPOIETIN 10000 [IU]/ML
80000 INJECTION, SOLUTION INTRAVENOUS; SUBCUTANEOUS ONCE
Refills: 0 | Status: COMPLETED | OUTPATIENT
Start: 2022-07-21 | End: 2022-07-21

## 2022-07-21 RX ADMIN — ERYTHROPOIETIN 80000 UNIT(S): 10000 INJECTION, SOLUTION INTRAVENOUS; SUBCUTANEOUS at 13:16

## 2022-07-22 ENCOUNTER — APPOINTMENT (OUTPATIENT)
Dept: INFUSION THERAPY | Facility: CLINIC | Age: 77
End: 2022-07-22

## 2022-07-22 LAB
HCT VFR BLD CALC: 25 %
HGB BLD-MCNC: 8.5 G/DL
MCHC RBC-ENTMCNC: 31.6 PG
MCHC RBC-ENTMCNC: 34 G/DL
MCV RBC AUTO: 92.9 FL
PLATELET # BLD AUTO: 496 K/UL
PMV BLD: 10.1 FL
RBC # BLD: 2.69 M/UL
RBC # FLD: 16.1 %
WBC # FLD AUTO: 7.4 K/UL

## 2022-07-28 ENCOUNTER — APPOINTMENT (OUTPATIENT)
Dept: INFUSION THERAPY | Facility: CLINIC | Age: 77
End: 2022-07-28

## 2022-07-28 ENCOUNTER — LABORATORY RESULT (OUTPATIENT)
Age: 77
End: 2022-07-28

## 2022-07-28 LAB
HCT VFR BLD CALC: 22.5 %
HGB BLD-MCNC: 7.6 G/DL
MCHC RBC-ENTMCNC: 31.4 PG
MCHC RBC-ENTMCNC: 33.8 G/DL
MCV RBC AUTO: 93 FL
PLATELET # BLD AUTO: 521 K/UL
PMV BLD: 10.3 FL
RBC # BLD: 2.42 M/UL
RBC # FLD: 17 %
WBC # FLD AUTO: 8.29 K/UL

## 2022-07-28 RX ORDER — ERYTHROPOIETIN 10000 [IU]/ML
80000 INJECTION, SOLUTION INTRAVENOUS; SUBCUTANEOUS ONCE
Refills: 0 | Status: COMPLETED | OUTPATIENT
Start: 2022-07-28 | End: 2022-07-28

## 2022-07-28 RX ADMIN — ERYTHROPOIETIN 80000 UNIT(S): 10000 INJECTION, SOLUTION INTRAVENOUS; SUBCUTANEOUS at 12:22

## 2022-08-04 ENCOUNTER — LABORATORY RESULT (OUTPATIENT)
Age: 77
End: 2022-08-04

## 2022-08-04 ENCOUNTER — APPOINTMENT (OUTPATIENT)
Dept: INFUSION THERAPY | Facility: CLINIC | Age: 77
End: 2022-08-04

## 2022-08-04 ENCOUNTER — APPOINTMENT (OUTPATIENT)
Dept: HEMATOLOGY ONCOLOGY | Facility: CLINIC | Age: 77
End: 2022-08-04

## 2022-08-04 VITALS
WEIGHT: 134 LBS | HEIGHT: 62 IN | TEMPERATURE: 97.1 F | HEART RATE: 109 BPM | SYSTOLIC BLOOD PRESSURE: 153 MMHG | RESPIRATION RATE: 16 BRPM | DIASTOLIC BLOOD PRESSURE: 65 MMHG | BODY MASS INDEX: 24.66 KG/M2

## 2022-08-04 PROCEDURE — 99213 OFFICE O/P EST LOW 20 MIN: CPT

## 2022-08-04 RX ORDER — LUSPATERCEPT 75 MG/1
110 INJECTION, POWDER, LYOPHILIZED, FOR SOLUTION SUBCUTANEOUS ONCE
Refills: 0 | Status: COMPLETED | OUTPATIENT
Start: 2022-08-04 | End: 2022-08-04

## 2022-08-04 RX ORDER — ERYTHROPOIETIN 10000 [IU]/ML
80000 INJECTION, SOLUTION INTRAVENOUS; SUBCUTANEOUS ONCE
Refills: 0 | Status: COMPLETED | OUTPATIENT
Start: 2022-08-04 | End: 2022-08-04

## 2022-08-04 RX ADMIN — ERYTHROPOIETIN 80000 UNIT(S): 10000 INJECTION, SOLUTION INTRAVENOUS; SUBCUTANEOUS at 16:46

## 2022-08-04 RX ADMIN — LUSPATERCEPT 110 MILLIGRAM(S): 75 INJECTION, POWDER, LYOPHILIZED, FOR SOLUTION SUBCUTANEOUS at 16:46

## 2022-08-04 NOTE — HISTORY OF PRESENT ILLNESS
[de-identified] : She missed on appointment for procrit last week.\par She starts her next cycle on 6/25/18\par C/o back pain radiating down her left which occurred when she bent to  something.\par No change in diarrhea.\par \par 7/31/18:\par Doing well. On Revlimid for more than 2yrs, 5 mg 2 weeks on 1 week off. She will be starting week on tonight. \par C/o diarrhea. On Imodium 2 pills AM and 2 pills PM. Doesn't help much with diarrhea. \par C/o nausea, abdominal pain. \par \par \par Colonoscopy in 2016 was normal. \par Did not have blood transfusion for over 2 years. \par On procrit [id number] weekly. Missed last week on 7/24/18 as she was out of town. She has been non compliant with weekly Procrit.\par Mammogram in 2017 was normal. \par \par 9/11/18\par pt is here for follow up.\par She feels the lomotil helps with the diarrhea.\par She was away for a few weeks and missed her procrit injections.\par No fever, abdominal  discomfort has been less since since use of lomotil.\par She started a new cycle 2 days ago.\par \par 10/2/18:\par She will start Revlimid tonight (week on). 2 weeks on, 1 week off. \par On ASA 81mg. \par Denies fever, nausea, vomiting, chest pain, SOB, abdominal pain, and bladder problems.\par C/o diarrhea. \par S/p 2 units PRBC transfusion on 9/25/18. \par On Procrit 82479 units weekly. Not compliant every week. \par C/o intermittent dizziness. \par \par 10/31/18\par Pt is here for follow up.\par C/O significant fatigue.\par Continues to have diarrhea, takes imodium and lomotil as needed.\par C/o dry cough, no fever.\par Cough started a month ago. It is worse in the mornings however over last week it has been constant all day.\par No chest pain.\par She was found to have low B12 levels and was started on B12 injections.\par \par 11/27/18:\par Doing well. Hospitalized for 3 days for cellulitis/abcess of the back s/p drainage and on Abx currently. Developed 3 days after Mg infusion as per pt. \par Denies nausea, vomiting, chest pain, SOB, abdominal pain, bowel and bladder problems.\par This is the week on Revlimid (week 1).\par S/p 1 unit PRBC transfusion in the hospital. \par On Procrit weekly \par \par 12/27/18:\par Doing well. No major complaints.\par Denies fever, nausea, vomiting, chest pain, SOB, abdominal pain, and bladder problems.\par On Revlimid 5 mg 2 weeks on 1 week off. This is the week off. She will start the week on sunday (12/30/18).\par Due for Procrit 74129 units today. \par Still has diarrhea. 4-5bm/day.\par On monthly B12 injections. \par \par 1/30/19:\par Patient here for follow up for MDS.  She is taking Revlimid 5 mg, 2 weeks on, 1 week off.  She will complete this current cycle on Saturday.  She has no new complaints today.  Patient denies cough, shortness of breath, denies fever, denies bone pain.\par \par 03/04/2019\par Patient is here for a follow up visit. She complaints of severe pain in her left shoulder and has had abscess drained 2 weeks ago. However the pain is worse and she has purulent discharge on examination. She also has Nasal sores that are painful. Culture from 11/2018 showed MRSA.  Denies Fever. \par She also complaints of swelling in her right leg. Not tender and not erythematous and negative Gong;s sign. \par Her diarrhea with Revlimid is ongoing and Imodium and Lomotil helps but not everyday. \par She is on 60,000 units of procrit weekly and Revlimid 5 mg 2 weeks on 1 week off. \par \par 4/23/19\par Pt is here for follow up.\par She feels well.\par The nasal sores have improved with bactroban\par The abscess on left shoulder has resolved.\par However she has a new one on the middle of her back.\par No fever.\par Pt has been on procrit 73770 units weekly, however she missed a dose in between.\par \par 5/8/19\par pt is here for follow up.\par no new complaints.\par feels well.\par Her skin abscess has resolved.\par she has been unable to go to ID, as she could not get an appt.\par No bleeding.\par She is compliant with revlimid.\par \par 6/6/19\par Pt is here for follow up.\par no new complaints \par Feels well.\par Is on week 2 of her Revlimid cycle. \par No transfusions since last visit\par \par 7/17/19\par Pt is here for follow up.\par C/O fatigue.\par Was away for a few days two weeks ago, but missed treatment with procrit for 2 weeks.\par Is complaint with Revlimid 2 weeks on 1 week off.\par Diarrhea is a little improved.\par \par 8/14/19\par Patient here for follow up visit, feeling well.  Although she is complaining of some pain at the left scapula area recently.  Patient denies cough, shortness of breath, denies fever, denies other bone pain.  She is off Revlimid  this week, due to restart on Monday.  She is scheduled for Procrit and Vitamin B12 injection today.  She plans to leave the country tomorrow to visit her mother who is ill.  She will return in about 10 days.\par \par 9/11/19\par Pt is here for follow up.\par She was away for 3 weeks, out of which she took procrit for 2 weeks in [place].\par She missed last week's dose.\par She had CBCs in [place] which showed Hgb of 8.9\par She feels well.\par She still getting diarrhea.\par She has been using lomotil with very minimal relief.\par She started a new cycle of Revlimid 4 days ago.\par \par 10/3/19\par Patient here for follow up visit, feeling fairly well.   Patient denies cough, shortness of breath, denies fever, denies other bone pain.  She remains on Revlimid.  She is scheduled for Procrit and Vitamin B12 injection today.\par \par 10/24/19\par Pt is here for follow up.\par Feels well.\par No SOB, fatigue.\par Compliant with procrit and Revl;imid.\par Remains on ASa.\par Diarrhea is unchanged.\par Pt takes imodium as needed.\par \par 11/14/19\par Pt is here for follow up.\par No new complaints.\par Starts a new cycle of Revlimid today.\par Diarrhea is same as before, no rectal bleeding.\par No fever.\par \par 12/5/19\par Pt is here for follow up.\par No new complaints.\par Denies feeling weaker than usual.\par No fever, SOB.\par No change in frequency of diarrhea.\par No pain.\par Will start next cycle of Revlimid in 3 days.\par \par \par !/16/20\par Pt was recently DCed from [facility] 3 days ago after being treated for pneumonia and MARCO.\par She is on po Levaquin.\par She restarted Revlmid 3 days ago.\par She is feeling better now. No fever.\par No SOB, Chest pain and back pain has resolved.\par Pt has a cough, no expectoration.\par She also recd a unit of PRBCs last week in the hospital\par \par \par 1/30/20\par Patient here for follow up visit, feeling well.  She has no new complaints. Cough is almost gone completely.  Patient denies shortness of breath, denies fever, denies bone pain.  Her appetite is ok, weight is stable.  She is due to restart Revlimid on Monday.\par \par 02/20/20\par Pt is here for follow up, feeling well.\par Offers no new complaints. Denies SOB, fever, chills, weight loss, CP, cough. \par Currently off week of Revlimid 5 mg. Restarts on Monday.\par Has procrit 80,000 units today. Next b 12 injection due in 2 weeks \par Did not get chest Xray\par CBC reviewed WBC 3.1, h/h 8.3/25%, plt 386, neutrophils 1.29\par \par 3/12/20\par Pt is here for follow up.\par She took Revlimid for 2 weeks and then has been off for one week although she was advised to take it daily without break.\par No SOB, Cough, fever.\par Has mild fatigue.\par \par 04/02/2020\par SIMONA KUHN a 75 year F is here today for follow up visit of MDS.\par Denies fever, chills, cough,night sweats, weight loss. \par Denies bleeding or bruising.\par Pt receives procrit 80,000 units weekly and B12 IM monthly. \par Continued Revlimid 5 mg daily x 3 weeks, has 1 dose left tonight. \par CBC reviewed: WBC 3.2, H/H 8.1/25.2, neutrophils 1.6, PLT 72\par \par 4/20/2020: The patient is here for follow up . She has no complaints of fever, chills, dizziness , chest pain and shortness of breath . She is still with diarrhea , up to 10 watery BMs per day, responds poorly to imodium and lomotil. She is on Revlimid 5 mg daily , took last dose yesterday night. Her last Procrit was on April 13. \par \par 5/26/20\par Pt is here for follow up.\par She is feeling well. Denies SOB, chest pain, fever.\par Continues to have diarrhea although she is off of Revlmid.\par Thinks it may due to diabetic meds.\par Wants to discuss BM bx results\par \par 6/22/20\par Pt is here for follow up.\par Feels well. Denies any fever, N, V, D\par Continues to have diarrhea, she will talk to her PCP about changing metformin for DM.\par Has been needing PRBCs 1 unit each month\par \par 7/20/20\par Pt is here for follow up.\par No new complaints. Has been feeling well.\par No SOB, CP. \par No N, V, D.\par She has recd 2 units of PRBCs since May 20.\par \par 8/17/20\par Pt is here for a follow up visit.\par She is feeling well.\par No new symptoms. No N, V, D.\par No bruising or bleeding. She continues to need PRBCs every 2-3 weeks.\par \par 8/31/20\par Pt is here for follow up. She is feeling well. No N, V, D.\par She is tolerating the hydrea well. No SOB, CP\par No bruising ior bleeding\par \par 9/8/20\par Pt is here for follow up.\par She had been contacted by the [facility] last week to advise her to stop the hydrea. Pt did not check her messages and continued with Hydrea.\par No fever, N, V, bleeding.\par Saw Dr Ferrell last week.\par \par 9/14/20\par Pt is here for folow up.\par Besides her usual complaint of diarrhea she is feeling well.\par Recd 1 unit PRBC last week.\par Has stopped Hydrea.\par No fever, mouth sores.\par \par 9/29/20\par Pt is here for folow up.\par No new complaints.\par Is seeing GI for diarrhea net week.\par No fever, night sweats.\par REcd 3 units of PRBC this month\par \par 10/13/20\par Pt is here for follow up.\par No new complaints.\par Has not needed a transfusion since 3 weeks ago.\par Continues to have diarrhea, waiting to be seen by GI\par \par 10/26/20\par Pt is here for follow up.\par C/O feeling fatigued.\par Has CLARK.\par No nausea or vomiting.\par No fever.\par Diarrhea has improved a little. She is no longer on Metformin\par \par 11/9/20\par Pt is here for follow up.\par Feels well. Denies SOB, CP, fatigue\par \par 12/7/20- Patient is for follow up and is due for cycle 9 of Vidaza.  She still has loose BM sometimes 10 times a day and was seeing Dr. Corey from GI- did not follow up recently and is unable to get an appt with him. She has lost about 10 lbs since September. No N/V. No fever or chills. No CP/SOB. She has been getting PRBC transfusion every 3 weeks now\par \par 01/04/20 Pt presented today for f/u visit . She is s/p 8 cycles of vidaza and was started on Luspatercept in Dec , 2020 . So far she received 1 injection , she is due for 2nd injection today . Adverse effect profile was discussed with her in detail , she reports having some dizziness and weakness after first injection . She received blood transfusion last wk . Blood work from today reviewed as well and counts are adequate . She denies any fevers,  chills,  or any new symptoms . \par \par 1/25/21\par Pt is here for follow up.\par C/O diarrhea, otherwise feeling better.\par Has not needed a transfusion since 12/29\par \par 2/16/21\par Pt is here for follow up.\par Needed transfusion on 2/8/21.\par Continues to C/o fatigue. Diarrhea remains the same, has not made GI appt as yet.\par \par 3/8/21\par Pt is here for follow up.\par Feels better today. Recd 1 unit PRBCs last week.\par Diarrhea is same as before. has an appt with GI next week.\par No fever\par \par 3/30/21\par Pt is here for follow up.\par Feels better. Last transfusion was on 3/2/21\par Saw GI and was started on cholestyramine and metformin was DCED with resolution of diarrhea.\par She denies any SOB,\par Fatigue is better\par \par 4/20/2021\par Pt is here to f/u for MDS\par She is s/p Luspatercept cycle #6\par She is compliant with Aspirin\par She feels better today, appetite is good\par She denies shortness of breath, fatigue, or weakness \par She c/o of diarrhea but it has improved since she was started on Cholestyramine. Stool is soft and less in frequency\par She received COVID vaccine x 2 doses\par \par 5/11/21\par pt is here for follow up.\par Feels the same with fatigue, diarrhea.\par No SOB\par \par 6/21/21\par Pt is here for follow up.\par Feels well. No SOB, CP, N< V.\par Diarrhea is better controlled now.\par Last PRBC transfusion was 2 weeks ago.\par \par 7/19/21\par Pt is here for follow up.\par Feels a little tired, last transfusion was 6/1/21.\par No bleeding, fever, SOB\par \par 8/10/2021\par Patient is here to follow up for her MDS.\par She is on Luspatercept, tolerating well.\par She feels well today, the appetite is good.\par She denies shortness of breath, fatigue, fever, night sweats, abdominal pain, arthralgias/myalgias.\par \par 8/31/21\par Pt is here for follow up.\par C/O feeling very fatigued.\par No bleeding.\par No fever.\par \par 9/21/2021\par Patient is here to follow up for MDS.\par She is on Luspatercept and Retacrit, tolerating well.\par She gets blood transfusion as needed for Hgb <7 gm/dL\par She is c/o of fatigue, no shortness of breath, dizziness, chills or lightheadedness.\par She denies melena or hematochezia.\par Her appetite is good, no nausea/vomiting.\par \par 10/28/2021\par Patient is here to follow up for MDS.\par She is on Luspatercept and Retacrit, tolerating well.\par She gets blood transfusion to keep Hgb > 7 gm/dL.\par She is c/o of chronic fatigue, no shortness of breath, dizziness, chills or lightheadedness.\par She denies melena or hematochezia.\par Her appetite is good, no nausea/vomiting.\par She c/o of severe diarrhea, 10-12x/day watery stool while on Imodium in the last 2 weeks.\par Last colonoscopy was in 2016, benign as per patient.\par \par 11/18/21\par Pt is here for follow up. C/O fatigue. No SOB, CP\par \par 12/9/21\par Pt is here for follow up.\par Feels better today. Recd 2 units last week in ER.\par Planning to go to [place] for over a week and does not want to miss her procrit dose.\par Diarrhea is stable,\par \par 12/23/21\par Pt is here for follow up.\par Feels better. No SOB, CP. Going to [place] tomorrow. Has not been able to get Procrit injection from the pharmacy yet d/t insurance issues\par \par 2/3/22\par Pt is here for follow up. Feeling same as usual. No SOB, CP. Last transfusion was 2 weeks ago in ER>\par No bleeding reported\par \par 3/3/22\par Pt is here for follow up.\par C/O fatigue. Had black stools X2\par \par 3/30/22\par Pt is feeling well.\par Here for treatment and BM biopsy\par \par 4/21/22\par Pt is here fir follow up.\par Was in ER last week. Was give 2 units of PRBCs.\par Pt feels less tired today.\par Wants to discuss BM rslts\par \par 5/12/22\par Pt is here today for follow up visit.\par Pt c/o feeling tired.  Hgb=6.9.  One unit transfusion scheduled.\par \par 6/23/22\par Pt is here for follow up. Feels well today. No SOB, chest pain.\par Has not made appt with [facility]\par \par 7/14/22\par Pt is here for follow up for lowrisk myelodysplastic syndrome.\par She came in late today because she was not feeling well this morning- dizziness & weakness.\par She denies SOB, CP.\par \par \par  [de-identified] : \par

## 2022-08-04 NOTE — ASSESSMENT
[FreeTextEntry1] : Patient is a 77 year old female with # Lowrisk myelodysplastic syndrome, previously treated with Revlimid and Procrit and Vidaza . 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 \par Patient is s/p  9 cycles of Vidaza and she was requiring pRBC transfusion every 3 weeks \par Given the persistent transfusion dependence she was switched to Luspatercept.\par \par \par DETECTED GENOMIC ALTERATIONS:\par Tier III: Variants of Unknown Clinical Significance\par SF3B1 [id number]\par Hyperkalemia likely due to increased platelet; pseudohyperkalemia.\par \par PLAN:\par -- Continue Luspatercept 1.75 mg/kg every 3 weeks, \par --Continue Retacrit (Procrit)  80,000 units every week.\par Side effects of Procrit discussed including but not limited to: hypertension, headache, pruritus, nausea, vomiting, injection site pain, arthralgia, cough, fever.\par -- Monitor CBC weekly and administer 1 PRBC unit  as needed to keep Hgb >7 gm/dL.  Pt has e/o iron overload so transfusions need to be kept at a minimum if possible\par \par Transfusion arranged for tomorrow (hgb=6.5)\par \par # Vitamin  B 12 deficiency\par -- Continue Vitamin B 12 injection every month\par \par # Thrombocytosis\par -- S/P Hydrea\par -- Will continue to monitor.\par \par rslts of  BM biopsy d/w pt. No e/o increased blasts. Pt is needing PRBCs almost every 3 weeks. Advised to follow up with Dr Ferrell to consider any other option or clinical trial\par Labs reviewed, \par Called Dr Ferrell's office again, waiting for response\par \par RTC in 3 weeks with Dr. Wade.\par \par \par \par

## 2022-08-04 NOTE — PHYSICAL EXAM
[Restricted in physically strenuous activity but ambulatory and able to carry out work of a light or sedentary nature] : Status 1- Restricted in physically strenuous activity but ambulatory and able to carry out work of a light or sedentary nature, e.g., light house work, office work [Normal] : affect appropriate [de-identified] : pallor

## 2022-08-05 ENCOUNTER — APPOINTMENT (OUTPATIENT)
Dept: INFUSION THERAPY | Facility: CLINIC | Age: 77
End: 2022-08-05

## 2022-08-08 LAB
ABO + RH PNL BLD: NORMAL
BLD GP AB SCN SERPL QL: NORMAL
HCT VFR BLD CALC: 19.9 %
HGB BLD-MCNC: 6.5 G/DL
MCHC RBC-ENTMCNC: 31.3 PG
MCHC RBC-ENTMCNC: 32.7 G/DL
MCV RBC AUTO: 95.7 FL
PLATELET # BLD AUTO: 274 K/UL
PMV BLD: 12.2 FL
RBC # BLD: 2.08 M/UL
RBC # FLD: 17.3 %
WBC # FLD AUTO: 6.85 K/UL

## 2022-08-11 ENCOUNTER — APPOINTMENT (OUTPATIENT)
Dept: INFUSION THERAPY | Facility: CLINIC | Age: 77
End: 2022-08-11

## 2022-08-11 ENCOUNTER — LABORATORY RESULT (OUTPATIENT)
Age: 77
End: 2022-08-11

## 2022-08-11 RX ORDER — ERYTHROPOIETIN 10000 [IU]/ML
80000 INJECTION, SOLUTION INTRAVENOUS; SUBCUTANEOUS ONCE
Refills: 0 | Status: COMPLETED | OUTPATIENT
Start: 2022-08-11 | End: 2022-08-11

## 2022-08-11 RX ADMIN — ERYTHROPOIETIN 80000 UNIT(S): 10000 INJECTION, SOLUTION INTRAVENOUS; SUBCUTANEOUS at 13:55

## 2022-08-16 LAB
HCT VFR BLD CALC: 25.9 %
HGB BLD-MCNC: 8.8 G/DL
MCHC RBC-ENTMCNC: 30 PG
MCHC RBC-ENTMCNC: 34 G/DL
MCV RBC AUTO: 88.4 FL
PLATELET # BLD AUTO: 281 K/UL
PMV BLD: 12.3 FL
RBC # BLD: 2.93 M/UL
RBC # FLD: 17.2 %
WBC # FLD AUTO: 10.2 K/UL

## 2022-08-18 ENCOUNTER — APPOINTMENT (OUTPATIENT)
Dept: INFUSION THERAPY | Facility: CLINIC | Age: 77
End: 2022-08-18

## 2022-08-19 ENCOUNTER — LABORATORY RESULT (OUTPATIENT)
Age: 77
End: 2022-08-19

## 2022-08-19 ENCOUNTER — APPOINTMENT (OUTPATIENT)
Dept: INFUSION THERAPY | Facility: CLINIC | Age: 77
End: 2022-08-19

## 2022-08-19 LAB
HCT VFR BLD CALC: 21.1 %
HGB BLD-MCNC: 7.2 G/DL
MCHC RBC-ENTMCNC: 30.6 PG
MCHC RBC-ENTMCNC: 34.1 G/DL
MCV RBC AUTO: 89.8 FL
PLATELET # BLD AUTO: 561 K/UL
PMV BLD: 10.2 FL
RBC # BLD: 2.35 M/UL
RBC # FLD: 17.7 %
WBC # FLD AUTO: 7.54 K/UL

## 2022-08-19 RX ORDER — ERYTHROPOIETIN 10000 [IU]/ML
80000 INJECTION, SOLUTION INTRAVENOUS; SUBCUTANEOUS ONCE
Refills: 0 | Status: COMPLETED | OUTPATIENT
Start: 2022-08-19 | End: 2022-08-19

## 2022-08-19 RX ADMIN — ERYTHROPOIETIN 80000 UNIT(S): 10000 INJECTION, SOLUTION INTRAVENOUS; SUBCUTANEOUS at 15:16

## 2022-08-22 ENCOUNTER — APPOINTMENT (OUTPATIENT)
Dept: INFUSION THERAPY | Facility: CLINIC | Age: 77
End: 2022-08-22

## 2022-08-22 ENCOUNTER — LABORATORY RESULT (OUTPATIENT)
Age: 77
End: 2022-08-22

## 2022-08-22 LAB
ABO + RH PNL BLD: NORMAL
BLD GP AB SCN SERPL QL: NORMAL
HCT VFR BLD CALC: 22.6 %
HGB BLD-MCNC: 7.7 G/DL
MCHC RBC-ENTMCNC: 30.8 PG
MCHC RBC-ENTMCNC: 34.1 G/DL
MCV RBC AUTO: 90.4 FL
PLATELET # BLD AUTO: 556 K/UL
PMV BLD: 11.2 FL
RBC # BLD: 2.5 M/UL
RBC # FLD: 18.6 %
WBC # FLD AUTO: 9.75 K/UL

## 2022-08-25 ENCOUNTER — APPOINTMENT (OUTPATIENT)
Dept: INFUSION THERAPY | Facility: CLINIC | Age: 77
End: 2022-08-25

## 2022-08-25 ENCOUNTER — APPOINTMENT (OUTPATIENT)
Dept: HEMATOLOGY ONCOLOGY | Facility: CLINIC | Age: 77
End: 2022-08-25

## 2022-08-25 ENCOUNTER — NON-APPOINTMENT (OUTPATIENT)
Age: 77
End: 2022-08-25

## 2022-08-26 ENCOUNTER — INPATIENT (INPATIENT)
Facility: HOSPITAL | Age: 77
LOS: 0 days | Discharge: HOME | End: 2022-08-27
Attending: STUDENT IN AN ORGANIZED HEALTH CARE EDUCATION/TRAINING PROGRAM | Admitting: STUDENT IN AN ORGANIZED HEALTH CARE EDUCATION/TRAINING PROGRAM

## 2022-08-26 ENCOUNTER — LABORATORY RESULT (OUTPATIENT)
Age: 77
End: 2022-08-26

## 2022-08-26 ENCOUNTER — APPOINTMENT (OUTPATIENT)
Dept: INFUSION THERAPY | Facility: CLINIC | Age: 77
End: 2022-08-26

## 2022-08-26 VITALS
SYSTOLIC BLOOD PRESSURE: 155 MMHG | HEIGHT: 62 IN | OXYGEN SATURATION: 98 % | TEMPERATURE: 97 F | HEART RATE: 111 BPM | WEIGHT: 138.01 LBS | DIASTOLIC BLOOD PRESSURE: 60 MMHG | RESPIRATION RATE: 20 BRPM

## 2022-08-26 LAB
ABO RH CONFIRMATION: SIGNIFICANT CHANGE UP
ALBUMIN SERPL ELPH-MCNC: 4.1 G/DL — SIGNIFICANT CHANGE UP (ref 3.5–5.2)
ALP SERPL-CCNC: 125 U/L — HIGH (ref 30–115)
ALT FLD-CCNC: 24 U/L — SIGNIFICANT CHANGE UP (ref 0–41)
ANION GAP SERPL CALC-SCNC: 14 MMOL/L — SIGNIFICANT CHANGE UP (ref 7–14)
ANISOCYTOSIS BLD QL: SIGNIFICANT CHANGE UP
APTT BLD: 28.8 SEC — SIGNIFICANT CHANGE UP (ref 27–39.2)
AST SERPL-CCNC: 18 U/L — SIGNIFICANT CHANGE UP (ref 0–41)
B-OH-BUTYR SERPL-SCNC: <0.2 MMOL/L — SIGNIFICANT CHANGE UP
BASE EXCESS BLDV CALC-SCNC: -6.6 MMOL/L — LOW (ref -2–3)
BASOPHILS # BLD AUTO: 0 K/UL — SIGNIFICANT CHANGE UP (ref 0–0.2)
BASOPHILS NFR BLD AUTO: 0 % — SIGNIFICANT CHANGE UP (ref 0–1)
BILIRUB SERPL-MCNC: 0.6 MG/DL — SIGNIFICANT CHANGE UP (ref 0.2–1.2)
BLD GP AB SCN SERPL QL: SIGNIFICANT CHANGE UP
BUN SERPL-MCNC: 35 MG/DL — HIGH (ref 10–20)
CA-I SERPL-SCNC: 1.23 MMOL/L — SIGNIFICANT CHANGE UP (ref 1.15–1.33)
CALCIUM SERPL-MCNC: 8.6 MG/DL — SIGNIFICANT CHANGE UP (ref 8.5–10.1)
CHLORIDE SERPL-SCNC: 95 MMOL/L — LOW (ref 98–110)
CO2 SERPL-SCNC: 19 MMOL/L — SIGNIFICANT CHANGE UP (ref 17–32)
CREAT SERPL-MCNC: 1.3 MG/DL — SIGNIFICANT CHANGE UP (ref 0.7–1.5)
EGFR: 43 ML/MIN/1.73M2 — LOW
EOSINOPHIL # BLD AUTO: 0 K/UL — SIGNIFICANT CHANGE UP (ref 0–0.7)
EOSINOPHIL NFR BLD AUTO: 0 % — SIGNIFICANT CHANGE UP (ref 0–8)
GAS PNL BLDV: 127 MMOL/L — LOW (ref 136–145)
GAS PNL BLDV: SIGNIFICANT CHANGE UP
GIANT PLATELETS BLD QL SMEAR: PRESENT — SIGNIFICANT CHANGE UP
GLUCOSE BLDC GLUCOMTR-MCNC: 356 MG/DL — HIGH (ref 70–99)
GLUCOSE SERPL-MCNC: 532 MG/DL — CRITICAL HIGH (ref 70–99)
HCO3 BLDV-SCNC: 19 MMOL/L — LOW (ref 22–29)
HCT VFR BLD CALC: 16.1 % — LOW (ref 37–47)
HCT VFR BLD CALC: 19.5 %
HCT VFR BLDA CALC: 17 % — CRITICAL LOW (ref 39–51)
HGB BLD CALC-MCNC: 5.8 G/DL — CRITICAL LOW (ref 12.6–17.4)
HGB BLD-MCNC: 5.4 G/DL — CRITICAL LOW (ref 12–16)
HGB BLD-MCNC: 6.6 G/DL
INR BLD: 1.16 RATIO — SIGNIFICANT CHANGE UP (ref 0.65–1.3)
LACTATE BLDV-MCNC: 3.3 MMOL/L — HIGH (ref 0.5–2)
LYMPHOCYTES # BLD AUTO: 0.52 K/UL — LOW (ref 1.2–3.4)
LYMPHOCYTES # BLD AUTO: 10.5 % — LOW (ref 20.5–51.1)
MANUAL SMEAR VERIFICATION: SIGNIFICANT CHANGE UP
MCHC RBC-ENTMCNC: 30.4 PG
MCHC RBC-ENTMCNC: 30.5 PG — SIGNIFICANT CHANGE UP (ref 27–31)
MCHC RBC-ENTMCNC: 33.5 G/DL — SIGNIFICANT CHANGE UP (ref 32–37)
MCHC RBC-ENTMCNC: 33.8 G/DL
MCV RBC AUTO: 89.9 FL
MCV RBC AUTO: 91 FL — SIGNIFICANT CHANGE UP (ref 81–99)
MICROCYTES BLD QL: SIGNIFICANT CHANGE UP
MONOCYTES # BLD AUTO: 0.35 K/UL — SIGNIFICANT CHANGE UP (ref 0.1–0.6)
MONOCYTES NFR BLD AUTO: 7 % — SIGNIFICANT CHANGE UP (ref 1.7–9.3)
NEUTROPHILS # BLD AUTO: 4.1 K/UL — SIGNIFICANT CHANGE UP (ref 1.4–6.5)
NEUTROPHILS NFR BLD AUTO: 82.5 % — HIGH (ref 42.2–75.2)
PCO2 BLDV: 38 MMHG — LOW (ref 39–42)
PH BLDV: 7.31 — LOW (ref 7.32–7.43)
PLAT MORPH BLD: NORMAL — SIGNIFICANT CHANGE UP
PLATELET # BLD AUTO: 261 K/UL
PLATELET # BLD AUTO: 393 K/UL — SIGNIFICANT CHANGE UP (ref 130–400)
PMV BLD: 11.9 FL
PO2 BLDV: 27 MMHG — SIGNIFICANT CHANGE UP
POIKILOCYTOSIS BLD QL AUTO: SIGNIFICANT CHANGE UP
POLYCHROMASIA BLD QL SMEAR: SLIGHT — SIGNIFICANT CHANGE UP
POTASSIUM BLDV-SCNC: 5.1 MMOL/L — SIGNIFICANT CHANGE UP (ref 3.5–5.1)
POTASSIUM SERPL-MCNC: 5.8 MMOL/L — HIGH (ref 3.5–5)
POTASSIUM SERPL-SCNC: 5.8 MMOL/L — HIGH (ref 3.5–5)
PROT SERPL-MCNC: 5.5 G/DL — LOW (ref 6–8)
PROTHROM AB SERPL-ACNC: 13.3 SEC — HIGH (ref 9.95–12.87)
RBC # BLD: 1.77 M/UL — LOW (ref 4.2–5.4)
RBC # BLD: 2.17 M/UL
RBC # FLD: 19.1 %
RBC # FLD: 19.3 % — HIGH (ref 11.5–14.5)
RBC BLD AUTO: ABNORMAL
SAO2 % BLDV: 43.4 % — SIGNIFICANT CHANGE UP
SARS-COV-2 RNA SPEC QL NAA+PROBE: SIGNIFICANT CHANGE UP
SODIUM SERPL-SCNC: 128 MMOL/L — LOW (ref 135–146)
WBC # BLD: 4.97 K/UL — SIGNIFICANT CHANGE UP (ref 4.8–10.8)
WBC # FLD AUTO: 4.97 K/UL — SIGNIFICANT CHANGE UP (ref 4.8–10.8)
WBC # FLD AUTO: 8.66 K/UL

## 2022-08-26 PROCEDURE — 71045 X-RAY EXAM CHEST 1 VIEW: CPT | Mod: 26

## 2022-08-26 PROCEDURE — 93010 ELECTROCARDIOGRAM REPORT: CPT

## 2022-08-26 PROCEDURE — 99285 EMERGENCY DEPT VISIT HI MDM: CPT

## 2022-08-26 PROCEDURE — 99223 1ST HOSP IP/OBS HIGH 75: CPT

## 2022-08-26 RX ORDER — INSULIN GLARGINE 100 [IU]/ML
12 INJECTION, SOLUTION SUBCUTANEOUS AT BEDTIME
Refills: 0 | Status: DISCONTINUED | OUTPATIENT
Start: 2022-08-26 | End: 2022-08-27

## 2022-08-26 RX ORDER — DEXTROSE 50 % IN WATER 50 %
15 SYRINGE (ML) INTRAVENOUS ONCE
Refills: 0 | Status: DISCONTINUED | OUTPATIENT
Start: 2022-08-26 | End: 2022-08-27

## 2022-08-26 RX ORDER — SODIUM CHLORIDE 9 MG/ML
1000 INJECTION, SOLUTION INTRAVENOUS
Refills: 0 | Status: DISCONTINUED | OUTPATIENT
Start: 2022-08-26 | End: 2022-08-27

## 2022-08-26 RX ORDER — INSULIN HUMAN 100 [IU]/ML
6 INJECTION, SOLUTION SUBCUTANEOUS ONCE
Refills: 0 | Status: COMPLETED | OUTPATIENT
Start: 2022-08-26 | End: 2022-08-26

## 2022-08-26 RX ORDER — DEXTROSE 50 % IN WATER 50 %
25 SYRINGE (ML) INTRAVENOUS ONCE
Refills: 0 | Status: DISCONTINUED | OUTPATIENT
Start: 2022-08-26 | End: 2022-08-27

## 2022-08-26 RX ORDER — DEXTROSE 50 % IN WATER 50 %
12.5 SYRINGE (ML) INTRAVENOUS ONCE
Refills: 0 | Status: DISCONTINUED | OUTPATIENT
Start: 2022-08-26 | End: 2022-08-27

## 2022-08-26 RX ORDER — SODIUM CHLORIDE 9 MG/ML
1000 INJECTION INTRAMUSCULAR; INTRAVENOUS; SUBCUTANEOUS ONCE
Refills: 0 | Status: COMPLETED | OUTPATIENT
Start: 2022-08-26 | End: 2022-08-26

## 2022-08-26 RX ORDER — INSULIN LISPRO 100/ML
VIAL (ML) SUBCUTANEOUS
Refills: 0 | Status: DISCONTINUED | OUTPATIENT
Start: 2022-08-26 | End: 2022-08-27

## 2022-08-26 RX ORDER — METFORMIN HYDROCHLORIDE 850 MG/1
500 TABLET ORAL
Qty: 0 | Refills: 0 | DISCHARGE

## 2022-08-26 RX ORDER — SODIUM ZIRCONIUM CYCLOSILICATE 10 G/10G
5 POWDER, FOR SUSPENSION ORAL ONCE
Refills: 0 | Status: COMPLETED | OUTPATIENT
Start: 2022-08-26 | End: 2022-08-26

## 2022-08-26 RX ORDER — SODIUM CHLORIDE 9 MG/ML
500 INJECTION INTRAMUSCULAR; INTRAVENOUS; SUBCUTANEOUS ONCE
Refills: 0 | Status: DISCONTINUED | OUTPATIENT
Start: 2022-08-26 | End: 2022-08-26

## 2022-08-26 RX ORDER — ERYTHROPOIETIN 10000 [IU]/ML
80000 INJECTION, SOLUTION INTRAVENOUS; SUBCUTANEOUS ONCE
Refills: 0 | Status: COMPLETED | OUTPATIENT
Start: 2022-08-26 | End: 2022-08-26

## 2022-08-26 RX ORDER — INSULIN LISPRO 100/ML
4 VIAL (ML) SUBCUTANEOUS
Refills: 0 | Status: DISCONTINUED | OUTPATIENT
Start: 2022-08-26 | End: 2022-08-27

## 2022-08-26 RX ORDER — INSULIN HUMAN 100 [IU]/ML
4 INJECTION, SOLUTION SUBCUTANEOUS ONCE
Refills: 0 | Status: COMPLETED | OUTPATIENT
Start: 2022-08-26 | End: 2022-08-26

## 2022-08-26 RX ORDER — GLUCAGON INJECTION, SOLUTION 0.5 MG/.1ML
1 INJECTION, SOLUTION SUBCUTANEOUS ONCE
Refills: 0 | Status: DISCONTINUED | OUTPATIENT
Start: 2022-08-26 | End: 2022-08-27

## 2022-08-26 RX ADMIN — INSULIN HUMAN 6 UNIT(S): 100 INJECTION, SOLUTION SUBCUTANEOUS at 18:36

## 2022-08-26 RX ADMIN — SODIUM CHLORIDE 1000 MILLILITER(S): 9 INJECTION INTRAMUSCULAR; INTRAVENOUS; SUBCUTANEOUS at 17:32

## 2022-08-26 RX ADMIN — ERYTHROPOIETIN 80000 UNIT(S): 10000 INJECTION, SOLUTION INTRAVENOUS; SUBCUTANEOUS at 13:43

## 2022-08-26 RX ADMIN — INSULIN HUMAN 4 UNIT(S): 100 INJECTION, SOLUTION SUBCUTANEOUS at 16:46

## 2022-08-26 RX ADMIN — SODIUM CHLORIDE 1000 MILLILITER(S): 9 INJECTION INTRAMUSCULAR; INTRAVENOUS; SUBCUTANEOUS at 16:45

## 2022-08-26 NOTE — H&P ADULT - NSHPREVIEWOFSYSTEMS_GEN_ALL_CORE
CONSTITUTIONAL: No fever, weight loss. + weakness  EYES: No eye pain, visual disturbances, or discharge  ENMT:  No difficulty hearing, tinnitus, vertigo; No sinus or throat pain  NECK: No pain or stiffness  RESPIRATORY: No cough, wheezing, chills or hemoptysis; No shortness of breath  CARDIOVASCULAR: No chest pain, palpitations, dizziness, or leg swelling  GASTROINTESTINAL: No abdominal or epigastric pain. No nausea, vomiting, or hematemesis; No diarrhea or constipation. No melena or hematochezia.  GENITOURINARY: No dysuria, frequency, hematuria, or incontinence  NEUROLOGICAL: No headaches, memory loss, loss of strength, numbness, or tremors  SKIN: No itching, burning, rashes, or lesions   MUSCULOSKELETAL: No joint pain or swelling; No muscle, back, or extremity pain  PSYCHIATRIC: No depression, anxiety, mood swings, or difficulty sleeping  HEME/LYMPH: No easy bruising, or bleeding gums  ALLERGY AND IMMUNOLOGIC: No hives or eczema

## 2022-08-26 NOTE — H&P ADULT - HISTORY OF PRESENT ILLNESS
76 yo female pt with PMH of DM, CKD stage 3,and MDS (follows with Dr. Wade as OP) sp 9 cycles of vidaza currently on Luspaterecept every 3 weeks, procrit (65407 unit) every week, pRBCs transfusions required almost every 3 weeks, CBC monitored weekly, presenting for a Hgb of 6.6 as OP. Pt was in the ED on 6/2 for a Hgb of 5.3 and was given 2 units of pRBCs and discharged. Pt reported feeling more weak over the past few days. Pt denied any melena, dizziness, falls, abdominal pain, or palpitations.     ED course:   VS: afebrile, HR 90, /63, Spo2 96% on RA   Labs: Hgb 5.4/ Na 128, K 5.8, Glucose 532, AG 14, Cr 1.3

## 2022-08-26 NOTE — ED PROVIDER NOTE - ATTENDING CONTRIBUTION TO CARE
76 yo female, PMHx of DM, MDS, anemia requiring transfusions  pt presents for eval of fatigue and anemia. pt endorses several days of increased fatigue.  pt had labs done which showed hgb 6.6.  no fever, chills, cough, ap. pt endorses chronic diarrhea which is baseline.      vs sinus tach  gen- NAD, aaox3  card-sinus tach  lungs-ctab, no wheezing or rhonchi  abd-sntnd, no guarding or rebound, rectal exam negative for blood  neuro- full str/sensation, cn ii-xii grossly intact, normal coordination and gait    screening labs, T&S, ekg, likely obs for transfusion

## 2022-08-26 NOTE — ED ADULT NURSE REASSESSMENT NOTE - NS ED NURSE REASSESS COMMENT FT1
Patient assessed with RN Casale.  Pt A/O x 4.  No acute pain or distress at this time.  cardiac monitoring maintained. Pt safety and comfort maintained. Pt admitted to Tele awaiting bed placement.

## 2022-08-26 NOTE — H&P ADULT - NSHPPHYSICALEXAM_GEN_ALL_CORE
CONSTITUTIONAL: NAD  SKIN: Warm dry  HEAD: NCAT  EYES: NL inspection  ENT: MMM  NECK: Supple; non tender.  CARD: RRR  RESP: CTAB  ABD: S/NT no R/G  EXT: no pedal edema. Rectal exam showed brown stool in vault.   NEURO: Grossly unremarkable  PSYCH: Cooperative, appropriate.

## 2022-08-26 NOTE — H&P ADULT - TIME BILLING
evaluating and treating the patient's acute illness(es) as well as time spent reviewing labs, radiology. This time is independent of any procedures performed.

## 2022-08-26 NOTE — ED PROVIDER NOTE - CLINICAL SUMMARY MEDICAL DECISION MAKING FREE TEXT BOX
pt presents for eval of fatigue. hgb as OP at 1242 showed hgb 6.6.  rpt at 1517 in ED showed 5.4.  rectal negative for blood. no other e/o active bleed, however, given rapid drop, will admit for monitoring. pt hyperglycemic w/o dka. pt given fluids/insulin. will admit to med tele

## 2022-08-26 NOTE — ED PROVIDER NOTE - OBJECTIVE STATEMENT
77 y f, pmh of DM, MDS, anemia, pw for transfusion. Today pt had CBC of 6.6 outpatient. Also endorses feeling weak for past 2-3 days. No fevers, cp, syncopal episode, abd pain, rectal bleeding, vaginal bleeding, trauma.

## 2022-08-26 NOTE — ED ADULT NURSE NOTE - NSIMPLEMENTINTERV_GEN_ALL_ED
Implemented All Fall with Harm Risk Interventions:  [place] to call system. Call bell, personal items and telephone within reach. Instruct patient to call for assistance. Room bathroom lighting operational. Non-slip footwear when patient is off stretcher. Physically safe environment: no spills, clutter or unnecessary equipment. Stretcher in lowest position, wheels locked, appropriate side rails in place. Provide visual cue, wrist band, yellow gown, etc. Monitor gait and stability. Monitor for mental status changes and reorient to person, place, and time. Review medications for side effects contributing to fall risk. Reinforce activity limits and safety measures with patient and family. Provide visual clues: red socks.

## 2022-08-26 NOTE — H&P ADULT - NSHPLABSRESULTS_GEN_ALL_CORE
5.4    4.97  )-----------( 393      ( 26 Aug 2022 15:17 )             16.1     08-26    128<L>  |  95<L>  |  35<H>  ----------------------------<  532<HH>  5.8<H>   |  19  |  1.3    Ca    8.6      26 Aug 2022 15:17    TPro  5.5<L>  /  Alb  4.1  /  TBili  0.6  /  DBili  x   /  AST  18  /  ALT  24  /  AlkPhos  125<H>  08-26    PT/INR - ( 26 Aug 2022 15:17 )   PT: 13.30 sec;   INR: 1.16 ratio         PTT - ( 26 Aug 2022 15:17 )  PTT:28.8 sec

## 2022-08-26 NOTE — H&P ADULT - ATTENDING COMMENTS
76 yo female pt with PMH of DM, CKD stage 3,and MDS (follows with Dr. Wade as OP) sp 9 cycles of vidaza currently on Luspaterecept every 3 weeks, procrit (39526 unit) every week, pRBCs transfusions required almost every 3 weeks, CBC monitored weekly, presenting for a Hgb of 6.6 as OP. Pt was in the ED on 6/2 for a Hgb of 5.3 and was given 2 units of pRBCs and discharged. Pt reported feeling more weak over the past few days. Pt denied any melena, dizziness, falls, abdominal pain, or palpitations.     ED course:   VS: afebrile, HR 90, /63, Spo2 96% on RA   Labs: Hgb 5.4/ Na 128, K 5.8, Glucose 532, AG 14, Cr 1.3    Social Hx: (-) smoking, alcohol, drugs  FHx: non-contributory    Physical Exam:  GENERAL: NAD, well-groomed, well-developed  HEAD:  NCAT  EYES: EOMI, PERRL  ENMT: No tonsillar erythema, exudates, or enlargement; Moist mucous membranes  NECK: Supple, No JVD, Normal thyroid  NERVOUS SYSTEM: AAOX4, Good concentration  CHEST/LUNG: CTA b/l no w/r/r  HEART: +s1s2 RRR no m/g/r  ABDOMEN: soft, NT/ND (+) bs  EXTREMITIES:  2+ Peripheral Pulses, No c/c/e  LYMPH: No lymphadenopathy noted  SKIN: No rashes or lesions    Labs:                        5.4    4.97  )-----------( 393      ( 26 Aug 2022 15:17 )             16.1                                   08-26    128<L>  |  95<L>  |  35<H>  ----------------------------<  532<HH>  5.8<H>   |  19  |  1.3    Ca    8.6      26 Aug 2022 15:17    TPro  5.5<L>  /  Alb  4.1  /  TBili  0.6  /  DBili  x   /  AST  18  /  ALT  24  /  AlkPhos  125<H>  08-26    LIVER FUNCTIONS - ( 26 Aug 2022 15:17 )  Alb: 4.1 g/dL / Pro: 5.5 g/dL / ALK PHOS: 125 U/L / ALT: 24 U/L / AST: 18 U/L / GGT: x         PT/INR - ( 26 Aug 2022 15:17 )   PT: 13.30 sec;   INR: 1.16 ratio    PTT - ( 26 Aug 2022 15:17 )  PTT:28.8 sec    12 Lead ECG (08.26.22 @ 16:00): Normal sinus rhythm    Assessment and Plan:    76 yo female pt with PMH of DM, CKD stage 3, and MDS (follows with Dr. Wade as OP) sp 9 cycles of vidaza currently on Luspaterecept every 3 weeks, procrit (34405 unit) every week, pRBCs transfusions required almost every 3 weeks, CBC monitored weekly, presenting for a Hgb of 6.6 as OP.    Acute on Chronic Anemia 2/2 to MDS  - Hgb 5.4 on admission   - hemodynamically stable   - KAREN negative  - colo 2017 normal   - follows with Dr. Wade as OP   - s/p 9 cycles of vidaza   - Luspaterecept every 3 weeks  - procrit (16178 unit) every week  - requiring pRBCs transfusions almost every 3 weeks  - s/p 2 units of pRBCS in ED  - was seen in heme office on 8/4 and plan was to follow up with Dr Ferrell for any possible enrollment in a clinical trial as pt is requiring frequent transfusions  - keep active type and screen. Transfuse as needed  - follow up with heme onc as OP      DM poorly controlled  - Glucose 532 on admission repeated   - BHB negative, AG normal, bicarb nrl   - will start on lantus 12 and lispro 4 TID adjust according to FS  - A1C     Hyperkalemia   - pt known to be hyperkalemic likely multifactorial  - K 5.8 on admission, no ekg changes   - s/p lokelma 5 once     CKD III  - Cr at baseline   - avoid nephrotoxic drugs     DVT: SCD  DASH diet  IAT    #Progress Note Handoff  Pending (specify):  Consults_________, Tests____CBC____, Test Results_______, Other_________  Disposition: Home__x /SNF___/Other________/Unknown at this time________

## 2022-08-26 NOTE — ED PROVIDER NOTE - CARE PLAN
1 Principal Discharge DX:	Hemoglobin low  Secondary Diagnosis:	Weakness  Secondary Diagnosis:	Hyperkalemia  Secondary Diagnosis:	High glucose level

## 2022-08-26 NOTE — ED PROVIDER NOTE - IV ALTEPLASE EXCL REL HIDDEN
12/31/19      Elizabeth Melendez  [address]        Dear Elizabeth Melendez      Recently, you were referred to see one of our Gastroenterology physicians for an endoscopic ultrasound. Our office attempted to contact you by phone on 11/21, 12/23, and 12/31/19 . Please call [phone] to discuss this further and schedule the procedure at  [facility] or [address] [place] [address]. If you have decided to see a Gastroenterologist elsewhere, please also call us with the information so we can update your records. If you already spoke to someone in the GI Department and have your procedure scheduled you can disregard this letter.       Sincerely,        CINDY VALIENTE [facility] Gastroenterology Team  [address] [place] [address] show

## 2022-08-26 NOTE — H&P ADULT - ASSESSMENT
78 yo female pt with PMH of DM, CKD stage 3, and MDS (follows with Dr. Wade as OP) sp 9 cycles of vidaza currently on Luspaterecept every 3 weeks, procrit (55386 unit) every week, pRBCs transfusions required almost every 3 weeks, CBC monitored weekly, presenting for a Hgb of 6.6 as OP.      # Acute on Chronic Anemia 2/2 to MDS  - Hgb 5.4 on admission   - hemodynamically stable   - KAREN negative  - colono 2017 normal   - follows with Dr. Wade as OP   - sp 9 cycles of vidaza   - Luspaterecept every 3 weeks  - procrit (63424 unit) every week  - requiring pRBCs transfusions almost every 3 weeks  - sp 2 units of pRBCS in ED  - was seen in heme office on 8/4 and plan was to follow up with Dr Ferrell for any possible enrollment in a clinical trial as pt is requiring frequent transfusions  - keep active type and screen  - follow up with heme onc as OP        #DM poorly controlled  - Glucose 532 on admission repeated   - BHB negative, AG normal, bicarb nrl   - will start on lantus 12 and lispro 4 TID adjust according to FS  - A1C in am      #Hyperkalemia   - pt known to be hyperkalemic likely multifactorial  - K 5.8 on admission, no ekg changes   - sp lokelma 5 once     # CKD stage 3  - Cr at baseline   - avoid nephrotoxic drugs       #DVT: SCD  #DASH diet  #IAT

## 2022-08-27 ENCOUNTER — TRANSCRIPTION ENCOUNTER (OUTPATIENT)
Age: 77
End: 2022-08-27

## 2022-08-27 VITALS — DIASTOLIC BLOOD PRESSURE: 91 MMHG | SYSTOLIC BLOOD PRESSURE: 154 MMHG

## 2022-08-27 LAB
ALBUMIN SERPL ELPH-MCNC: 3.8 G/DL — SIGNIFICANT CHANGE UP (ref 3.5–5.2)
ALBUMIN SERPL ELPH-MCNC: 4.2 G/DL — SIGNIFICANT CHANGE UP (ref 3.5–5.2)
ALP SERPL-CCNC: 122 U/L — HIGH (ref 30–115)
ALP SERPL-CCNC: 122 U/L — HIGH (ref 30–115)
ALT FLD-CCNC: 22 U/L — SIGNIFICANT CHANGE UP (ref 0–41)
ALT FLD-CCNC: 24 U/L — SIGNIFICANT CHANGE UP (ref 0–41)
ANION GAP SERPL CALC-SCNC: 10 MMOL/L — SIGNIFICANT CHANGE UP (ref 7–14)
ANION GAP SERPL CALC-SCNC: 11 MMOL/L — SIGNIFICANT CHANGE UP (ref 7–14)
AST SERPL-CCNC: 19 U/L — SIGNIFICANT CHANGE UP (ref 0–41)
AST SERPL-CCNC: 20 U/L — SIGNIFICANT CHANGE UP (ref 0–41)
BASOPHILS # BLD AUTO: 0.07 K/UL — SIGNIFICANT CHANGE UP (ref 0–0.2)
BASOPHILS # BLD AUTO: 0.08 K/UL — SIGNIFICANT CHANGE UP (ref 0–0.2)
BASOPHILS NFR BLD AUTO: 1.2 % — HIGH (ref 0–1)
BASOPHILS NFR BLD AUTO: 1.3 % — HIGH (ref 0–1)
BILIRUB SERPL-MCNC: 0.9 MG/DL — SIGNIFICANT CHANGE UP (ref 0.2–1.2)
BILIRUB SERPL-MCNC: 1.3 MG/DL — HIGH (ref 0.2–1.2)
BUN SERPL-MCNC: 32 MG/DL — HIGH (ref 10–20)
BUN SERPL-MCNC: 32 MG/DL — HIGH (ref 10–20)
CALCIUM SERPL-MCNC: 8.7 MG/DL — SIGNIFICANT CHANGE UP (ref 8.5–10.1)
CALCIUM SERPL-MCNC: 8.8 MG/DL — SIGNIFICANT CHANGE UP (ref 8.5–10.1)
CHLORIDE SERPL-SCNC: 103 MMOL/L — SIGNIFICANT CHANGE UP (ref 98–110)
CHLORIDE SERPL-SCNC: 107 MMOL/L — SIGNIFICANT CHANGE UP (ref 98–110)
CO2 SERPL-SCNC: 19 MMOL/L — SIGNIFICANT CHANGE UP (ref 17–32)
CO2 SERPL-SCNC: 20 MMOL/L — SIGNIFICANT CHANGE UP (ref 17–32)
CREAT SERPL-MCNC: 1.1 MG/DL — SIGNIFICANT CHANGE UP (ref 0.7–1.5)
CREAT SERPL-MCNC: 1.2 MG/DL — SIGNIFICANT CHANGE UP (ref 0.7–1.5)
EGFR: 47 ML/MIN/1.73M2 — LOW
EGFR: 52 ML/MIN/1.73M2 — LOW
EOSINOPHIL # BLD AUTO: 0.07 K/UL — SIGNIFICANT CHANGE UP (ref 0–0.7)
EOSINOPHIL # BLD AUTO: 0.07 K/UL — SIGNIFICANT CHANGE UP (ref 0–0.7)
EOSINOPHIL NFR BLD AUTO: 1.1 % — SIGNIFICANT CHANGE UP (ref 0–8)
EOSINOPHIL NFR BLD AUTO: 1.2 % — SIGNIFICANT CHANGE UP (ref 0–8)
GLUCOSE BLDC GLUCOMTR-MCNC: 110 MG/DL — HIGH (ref 70–99)
GLUCOSE BLDC GLUCOMTR-MCNC: 178 MG/DL — HIGH (ref 70–99)
GLUCOSE BLDC GLUCOMTR-MCNC: 241 MG/DL — HIGH (ref 70–99)
GLUCOSE BLDC GLUCOMTR-MCNC: 96 MG/DL — SIGNIFICANT CHANGE UP (ref 70–99)
GLUCOSE SERPL-MCNC: 170 MG/DL — HIGH (ref 70–99)
GLUCOSE SERPL-MCNC: 176 MG/DL — HIGH (ref 70–99)
HCT VFR BLD CALC: 28.6 % — LOW (ref 37–47)
HCT VFR BLD CALC: 29.5 % — LOW (ref 37–47)
HGB BLD-MCNC: 10.5 G/DL — LOW (ref 12–16)
HGB BLD-MCNC: 9.7 G/DL — LOW (ref 12–16)
IMM GRANULOCYTES NFR BLD AUTO: 1 % — HIGH (ref 0.1–0.3)
IMM GRANULOCYTES NFR BLD AUTO: 1.5 % — HIGH (ref 0.1–0.3)
LACTATE SERPL-SCNC: 2 MMOL/L — SIGNIFICANT CHANGE UP (ref 0.7–2)
LACTATE SERPL-SCNC: 2.7 MMOL/L — HIGH (ref 0.7–2)
LYMPHOCYTES # BLD AUTO: 1.31 K/UL — SIGNIFICANT CHANGE UP (ref 1.2–3.4)
LYMPHOCYTES # BLD AUTO: 1.48 K/UL — SIGNIFICANT CHANGE UP (ref 1.2–3.4)
LYMPHOCYTES # BLD AUTO: 22.6 % — SIGNIFICANT CHANGE UP (ref 20.5–51.1)
LYMPHOCYTES # BLD AUTO: 24 % — SIGNIFICANT CHANGE UP (ref 20.5–51.1)
MCHC RBC-ENTMCNC: 28.9 PG — SIGNIFICANT CHANGE UP (ref 27–31)
MCHC RBC-ENTMCNC: 29.5 PG — SIGNIFICANT CHANGE UP (ref 27–31)
MCHC RBC-ENTMCNC: 33.9 G/DL — SIGNIFICANT CHANGE UP (ref 32–37)
MCHC RBC-ENTMCNC: 35.6 G/DL — SIGNIFICANT CHANGE UP (ref 32–37)
MCV RBC AUTO: 82.9 FL — SIGNIFICANT CHANGE UP (ref 81–99)
MCV RBC AUTO: 85.1 FL — SIGNIFICANT CHANGE UP (ref 81–99)
MONOCYTES # BLD AUTO: 0.77 K/UL — HIGH (ref 0.1–0.6)
MONOCYTES # BLD AUTO: 0.87 K/UL — HIGH (ref 0.1–0.6)
MONOCYTES NFR BLD AUTO: 13.3 % — HIGH (ref 1.7–9.3)
MONOCYTES NFR BLD AUTO: 14.1 % — HIGH (ref 1.7–9.3)
NEUTROPHILS # BLD AUTO: 3.51 K/UL — SIGNIFICANT CHANGE UP (ref 1.4–6.5)
NEUTROPHILS # BLD AUTO: 3.57 K/UL — SIGNIFICANT CHANGE UP (ref 1.4–6.5)
NEUTROPHILS NFR BLD AUTO: 58 % — SIGNIFICANT CHANGE UP (ref 42.2–75.2)
NEUTROPHILS NFR BLD AUTO: 60.7 % — SIGNIFICANT CHANGE UP (ref 42.2–75.2)
NRBC # BLD: 0 /100 WBCS — SIGNIFICANT CHANGE UP (ref 0–0)
NRBC # BLD: 0 /100 WBCS — SIGNIFICANT CHANGE UP (ref 0–0)
PLATELET # BLD AUTO: 333 K/UL — SIGNIFICANT CHANGE UP (ref 130–400)
PLATELET # BLD AUTO: 361 K/UL — SIGNIFICANT CHANGE UP (ref 130–400)
POTASSIUM SERPL-MCNC: 4.5 MMOL/L — SIGNIFICANT CHANGE UP (ref 3.5–5)
POTASSIUM SERPL-MCNC: 5.7 MMOL/L — HIGH (ref 3.5–5)
POTASSIUM SERPL-SCNC: 4.5 MMOL/L — SIGNIFICANT CHANGE UP (ref 3.5–5)
POTASSIUM SERPL-SCNC: 5.7 MMOL/L — HIGH (ref 3.5–5)
PROT SERPL-MCNC: 5.7 G/DL — LOW (ref 6–8)
PROT SERPL-MCNC: 5.8 G/DL — LOW (ref 6–8)
RBC # BLD: 3.36 M/UL — LOW (ref 4.2–5.4)
RBC # BLD: 3.56 M/UL — LOW (ref 4.2–5.4)
RBC # FLD: 17 % — HIGH (ref 11.5–14.5)
RBC # FLD: 18.7 % — HIGH (ref 11.5–14.5)
SODIUM SERPL-SCNC: 133 MMOL/L — LOW (ref 135–146)
SODIUM SERPL-SCNC: 137 MMOL/L — SIGNIFICANT CHANGE UP (ref 135–146)
WBC # BLD: 5.79 K/UL — SIGNIFICANT CHANGE UP (ref 4.8–10.8)
WBC # BLD: 6.16 K/UL — SIGNIFICANT CHANGE UP (ref 4.8–10.8)
WBC # FLD AUTO: 5.79 K/UL — SIGNIFICANT CHANGE UP (ref 4.8–10.8)
WBC # FLD AUTO: 6.16 K/UL — SIGNIFICANT CHANGE UP (ref 4.8–10.8)

## 2022-08-27 PROCEDURE — 99239 HOSP IP/OBS DSCHRG MGMT >30: CPT

## 2022-08-27 RX ORDER — AMLODIPINE BESYLATE 2.5 MG/1
2.5 TABLET ORAL ONCE
Refills: 0 | Status: COMPLETED | OUTPATIENT
Start: 2022-08-27 | End: 2022-08-27

## 2022-08-27 RX ORDER — SODIUM CHLORIDE 9 MG/ML
1000 INJECTION INTRAMUSCULAR; INTRAVENOUS; SUBCUTANEOUS
Refills: 0 | Status: DISCONTINUED | OUTPATIENT
Start: 2022-08-27 | End: 2022-08-27

## 2022-08-27 RX ORDER — LOSARTAN POTASSIUM 100 MG/1
25 TABLET, FILM COATED ORAL DAILY
Refills: 0 | Status: DISCONTINUED | OUTPATIENT
Start: 2022-08-27 | End: 2022-08-27

## 2022-08-27 RX ORDER — SODIUM ZIRCONIUM CYCLOSILICATE 10 G/10G
5 POWDER, FOR SUSPENSION ORAL ONCE
Refills: 0 | Status: COMPLETED | OUTPATIENT
Start: 2022-08-27 | End: 2022-08-27

## 2022-08-27 RX ADMIN — SODIUM CHLORIDE 100 MILLILITER(S): 9 INJECTION INTRAMUSCULAR; INTRAVENOUS; SUBCUTANEOUS at 14:05

## 2022-08-27 RX ADMIN — AMLODIPINE BESYLATE 2.5 MILLIGRAM(S): 2.5 TABLET ORAL at 03:27

## 2022-08-27 RX ADMIN — SODIUM ZIRCONIUM CYCLOSILICATE 5 GRAM(S): 10 POWDER, FOR SUSPENSION ORAL at 08:31

## 2022-08-27 RX ADMIN — INSULIN GLARGINE 12 UNIT(S): 100 INJECTION, SOLUTION SUBCUTANEOUS at 00:58

## 2022-08-27 RX ADMIN — Medication 4 UNIT(S): at 12:00

## 2022-08-27 RX ADMIN — LOSARTAN POTASSIUM 25 MILLIGRAM(S): 100 TABLET, FILM COATED ORAL at 18:24

## 2022-08-27 RX ADMIN — Medication 4: at 12:00

## 2022-08-27 RX ADMIN — SODIUM ZIRCONIUM CYCLOSILICATE 5 GRAM(S): 10 POWDER, FOR SUSPENSION ORAL at 01:19

## 2022-08-27 RX ADMIN — Medication 4 UNIT(S): at 17:35

## 2022-08-27 NOTE — DISCHARGE NOTE PROVIDER - ATTENDING DISCHARGE PHYSICAL EXAMINATION:
Attending attestation  Attending DC note  Pt seen and examined at bedside.  No cp or sob. SPoke to family and updated, needs to follow up  vitals, labs, exam stable  Hospital course as above.  Plan dw pt and agreed to plan  Medically cleared for DC. Med recc completed.  [place] resident. Spent 32 mins on case

## 2022-08-27 NOTE — DISCHARGE NOTE PROVIDER - NSDCFUADDAPPT_GEN_ALL_CORE_FT
Please follow up with your endocrinologist since your blood surgar levels were high on this admission.     Please follow up with Dr. Wade for your blood.

## 2022-08-27 NOTE — DISCHARGE NOTE PROVIDER - NSDCCPCAREPLAN_GEN_ALL_CORE_FT
PRINCIPAL DISCHARGE DIAGNOSIS  Diagnosis: Hemoglobin low  Assessment and Plan of Treatment: Anemia is a condition in which the concentration of red blood cells or hemoglobin in the blood is below normal. Hemoglobin is a substance in red blood cells that carries oxygen to the tissues of the body. Anemia results in not enough oxygen reaching these tissues which can cause symptoms such as weakness, dizziness/lightheadedness, shortness of breath, chest pain, paleness, or nausea. The cause of your anemia may or may not be determined immediately. If your hemoglobin was dangerously low, you may have received a blood transfusion. Usually reactions to transfusions occur immediately but monitor yourself for any fevers, rash, or shortness of breath.  SEEK IMMEDIATE MEDICAL CARE IF YOU HAVE ANY OF THE FOLLOWING SYMPTOMS: extreme weakness/chest pain/shortness of breath, black or bloody stools, vomiting blood, fainting, fever, or any signs of dehydration.        SECONDARY DISCHARGE DIAGNOSES  Diagnosis: Weakness  Assessment and Plan of Treatment:     Diagnosis: Hyperkalemia  Assessment and Plan of Treatment:     Diagnosis: High glucose level  Assessment and Plan of Treatment:

## 2022-08-27 NOTE — DISCHARGE NOTE PROVIDER - PROVIDER TOKENS
PROVIDER:[TOKEN:[[id number]]],PROVIDER:[TOKEN:[[id number]]],PROVIDER:[TOKEN:[[id number]]] PROVIDER:[TOKEN:[[id number]],FOLLOWUP:[1 week]],PROVIDER:[TOKEN:[[id number]],FOLLOWUP:[1 week]],PROVIDER:[TOKEN:[[id number]]]

## 2022-08-27 NOTE — DISCHARGE NOTE PROVIDER - CARE PROVIDER_API CALL
ERNESTO SIEGEL  Internal Medicine  [address] [place] [address]  Phone: ()-  Fax: ()-  Follow Up Time:     ABNER PRICE  Endocrinology, Diabetes and Metabolism  Phone: [phone]  Fax: ()-  Follow Up Time:     Kaveh Wade)  Hematology; Internal Medicine; Medical Oncology  [address] [place] [address]  Phone: [phone]  Fax: [phone]  Follow Up Time:    ERNESTO SIEGEL  Internal Medicine  [address] [place] [address]  Phone: ()-  Fax: ()-  Follow Up Time: 1 week    ABNER PRICE  Endocrinology, Diabetes and Metabolism  Phone: [phone]  Fax: ()-  Follow Up Time: 1 week    Kaveh Wade)  Hematology; Internal Medicine; Medical Oncology  [address] [place] [address]  Phone: [phone]  Fax: [phone]  Follow Up Time:

## 2022-08-27 NOTE — DISCHARGE NOTE PROVIDER - CARE PROVIDERS DIRECT ADDRESSES
,DirectAddress_Unknown,DirectAddress_Unknown,[email or web address]

## 2022-08-27 NOTE — DISCHARGE NOTE PROVIDER - HOSPITAL COURSE
76 yo female pt with PMH of DM, CKD stage 3,and MDS (follows with Dr. Wade as OP) sp 9 cycles of vidaza currently on Luspaterecept every 3 weeks, procrit (51953 unit) every week, pRBCs transfusions required almost every 3 weeks, CBC monitored weekly, presenting for a Hgb of 6.6 as OP. Pt was in the ED on 6/2 for a Hgb of 5.3 and was given 2 units of pRBCs and discharged. Pt reported feeling more weak over the past few days. Pt denied any melena, dizziness, falls, abdominal pain, or palpitations.     ED course:   VS: afebrile, HR 90, /63, Spo2 96% on RA   Labs: Hgb 5.4/ Na 128, K 5.8, Glucose 532, AG 14, Cr 1.3    Patient received 2 units of PRBC.   Repeat labs: 10.5. creatinine 1.1    Patient has been having high glucose since admission, she was told to follow up with her endocrinologist 76 yo female pt with PMH of DM, CKD stage 3,and MDS (follows with Dr. Wade as OP) sp 9 cycles of vidaza currently on Luspaterecept every 3 weeks, procrit (69359 unit) every week, pRBCs transfusions required almost every 3 weeks, CBC monitored weekly, presenting for a Hgb of 6.6 as OP. Pt was in the ED on 6/2 for a Hgb of 5.3 and was given 2 units of pRBCs and discharged. Pt reported feeling more weak over the past few days. Pt denied any melena, dizziness, falls, abdominal pain, or palpitations.     ED course:   VS: afebrile, HR 90, /63, Spo2 96% on RA   Labs: Hgb 5.4/ Na 128, K 5.8, Glucose 532, AG 14, Cr 1.3    Patient received 2 units of PRBC.   Repeat labs: 10.5. creatinine 1.1    Patient has been having high glucose since admission, she was told to follow up with her endocrinologist    lactate trended down  BP stable, will need follow up, likely 2/2 fluids improved after

## 2022-08-27 NOTE — DISCHARGE NOTE PROVIDER - NSDCMRMEDTOKEN_GEN_ALL_CORE_FT
glimepiride 4 mg oral tablet: 1 tab(s) orally 2 times a day  Procrit: injectable every 7 days  repaglinide 0.5 mg oral tablet: 1 tab(s) orally 3 times a day (before meals)

## 2022-09-01 DIAGNOSIS — N18.30 CHRONIC KIDNEY DISEASE, STAGE 3 UNSPECIFIED: ICD-10-CM

## 2022-09-01 DIAGNOSIS — D46.9 MYELODYSPLASTIC SYNDROME, UNSPECIFIED: ICD-10-CM

## 2022-09-01 DIAGNOSIS — Z79.84 LONG TERM (CURRENT) USE OF ORAL HYPOGLYCEMIC DRUGS: ICD-10-CM

## 2022-09-01 DIAGNOSIS — E11.65 TYPE 2 DIABETES MELLITUS WITH HYPERGLYCEMIA: ICD-10-CM

## 2022-09-01 DIAGNOSIS — E87.5 HYPERKALEMIA: ICD-10-CM

## 2022-09-01 DIAGNOSIS — E11.22 TYPE 2 DIABETES MELLITUS WITH DIABETIC CHRONIC KIDNEY DISEASE: ICD-10-CM

## 2022-09-02 ENCOUNTER — LABORATORY RESULT (OUTPATIENT)
Age: 77
End: 2022-09-02

## 2022-09-02 ENCOUNTER — APPOINTMENT (OUTPATIENT)
Dept: INFUSION THERAPY | Facility: CLINIC | Age: 77
End: 2022-09-02

## 2022-09-02 ENCOUNTER — OUTPATIENT (OUTPATIENT)
Dept: OUTPATIENT SERVICES | Facility: HOSPITAL | Age: 77
LOS: 1 days | Discharge: HOME | End: 2022-09-02

## 2022-09-02 DIAGNOSIS — D46.9 MYELODYSPLASTIC SYNDROME, UNSPECIFIED: ICD-10-CM

## 2022-09-02 DIAGNOSIS — Z51.11 ENCOUNTER FOR ANTINEOPLASTIC CHEMOTHERAPY: ICD-10-CM

## 2022-09-02 DIAGNOSIS — D64.9 ANEMIA, UNSPECIFIED: ICD-10-CM

## 2022-09-02 DIAGNOSIS — Z79.899 OTHER LONG TERM (CURRENT) DRUG THERAPY: ICD-10-CM

## 2022-09-02 LAB
HCT VFR BLD CALC: 26.5 %
HGB BLD-MCNC: 9.1 G/DL
MCHC RBC-ENTMCNC: 29.4 PG
MCHC RBC-ENTMCNC: 34.3 G/DL
MCV RBC AUTO: 85.8 FL
PLATELET # BLD AUTO: 340 K/UL
PMV BLD: 11.2 FL
RBC # BLD: 3.09 M/UL
RBC # FLD: 18.4 %
WBC # FLD AUTO: 5.98 K/UL

## 2022-09-02 RX ORDER — LUSPATERCEPT 75 MG/1
110 INJECTION, POWDER, LYOPHILIZED, FOR SOLUTION SUBCUTANEOUS ONCE
Refills: 0 | Status: COMPLETED | OUTPATIENT
Start: 2022-09-02 | End: 2022-09-02

## 2022-09-02 RX ORDER — ERYTHROPOIETIN 10000 [IU]/ML
80000 INJECTION, SOLUTION INTRAVENOUS; SUBCUTANEOUS ONCE
Refills: 0 | Status: COMPLETED | OUTPATIENT
Start: 2022-09-02 | End: 2022-09-02

## 2022-09-02 RX ADMIN — ERYTHROPOIETIN 80000 UNIT(S): 10000 INJECTION, SOLUTION INTRAVENOUS; SUBCUTANEOUS at 13:42

## 2022-09-02 RX ADMIN — LUSPATERCEPT 110 MILLIGRAM(S): 75 INJECTION, POWDER, LYOPHILIZED, FOR SOLUTION SUBCUTANEOUS at 13:42

## 2022-09-07 ENCOUNTER — APPOINTMENT (OUTPATIENT)
Dept: HEMATOLOGY ONCOLOGY | Facility: CLINIC | Age: 77
End: 2022-09-07

## 2022-09-08 ENCOUNTER — APPOINTMENT (OUTPATIENT)
Dept: INFUSION THERAPY | Facility: CLINIC | Age: 77
End: 2022-09-08

## 2022-09-08 LAB
BASOPHILS # BLD AUTO: 0.08 K/UL
BASOPHILS NFR BLD AUTO: 1.3 %
EOSINOPHIL # BLD AUTO: 0.06 K/UL
EOSINOPHIL NFR BLD AUTO: 1 %
HCT VFR BLD CALC: 25.5 %
HGB BLD-MCNC: 8.6 G/DL
IMM GRANULOCYTES NFR BLD AUTO: 1.5 %
LYMPHOCYTES # BLD AUTO: 1.13 K/UL
LYMPHOCYTES NFR BLD AUTO: 19.1 %
MAN DIFF?: NORMAL
MCHC RBC-ENTMCNC: 29.5 PG
MCHC RBC-ENTMCNC: 33.7 G/DL
MCV RBC AUTO: 87.3 FL
MONOCYTES # BLD AUTO: 0.65 K/UL
MONOCYTES NFR BLD AUTO: 11 %
NEUTROPHILS # BLD AUTO: 3.92 K/UL
NEUTROPHILS NFR BLD AUTO: 66.1 %
PLATELET # BLD AUTO: 489 K/UL
RBC # BLD: 2.92 M/UL
RBC # FLD: 18.5 %
WBC # FLD AUTO: 5.93 K/UL

## 2022-09-09 ENCOUNTER — APPOINTMENT (OUTPATIENT)
Dept: INFUSION THERAPY | Facility: CLINIC | Age: 77
End: 2022-09-09

## 2022-09-09 RX ORDER — ERYTHROPOIETIN 10000 [IU]/ML
80000 INJECTION, SOLUTION INTRAVENOUS; SUBCUTANEOUS ONCE
Refills: 0 | Status: COMPLETED | OUTPATIENT
Start: 2022-09-09 | End: 2022-09-09

## 2022-09-09 RX ADMIN — ERYTHROPOIETIN 80000 UNIT(S): 10000 INJECTION, SOLUTION INTRAVENOUS; SUBCUTANEOUS at 14:21

## 2022-09-15 ENCOUNTER — APPOINTMENT (OUTPATIENT)
Dept: INFUSION THERAPY | Facility: CLINIC | Age: 77
End: 2022-09-15

## 2022-09-16 ENCOUNTER — APPOINTMENT (OUTPATIENT)
Dept: INFUSION THERAPY | Facility: CLINIC | Age: 77
End: 2022-09-16

## 2022-09-16 ENCOUNTER — OUTPATIENT (OUTPATIENT)
Dept: OUTPATIENT SERVICES | Facility: HOSPITAL | Age: 77
LOS: 1 days | Discharge: HOME | End: 2022-09-16

## 2022-09-16 DIAGNOSIS — Z51.11 ENCOUNTER FOR ANTINEOPLASTIC CHEMOTHERAPY: ICD-10-CM

## 2022-09-16 DIAGNOSIS — D64.9 ANEMIA, UNSPECIFIED: ICD-10-CM

## 2022-09-16 DIAGNOSIS — D46.9 MYELODYSPLASTIC SYNDROME, UNSPECIFIED: ICD-10-CM

## 2022-09-16 RX ORDER — ERYTHROPOIETIN 10000 [IU]/ML
80000 INJECTION, SOLUTION INTRAVENOUS; SUBCUTANEOUS ONCE
Refills: 0 | Status: COMPLETED | OUTPATIENT
Start: 2022-09-16 | End: 2022-09-16

## 2022-09-16 RX ADMIN — ERYTHROPOIETIN 80000 UNIT(S): 10000 INJECTION, SOLUTION INTRAVENOUS; SUBCUTANEOUS at 16:10

## 2022-09-19 LAB
BASOPHILS # BLD AUTO: 0.08 K/UL
BASOPHILS NFR BLD AUTO: 1.1 %
EOSINOPHIL # BLD AUTO: 0.05 K/UL
EOSINOPHIL NFR BLD AUTO: 0.7 %
HCT VFR BLD CALC: 21.4 %
HGB BLD-MCNC: 7.2 G/DL
IMM GRANULOCYTES NFR BLD AUTO: 1.1 %
LYMPHOCYTES # BLD AUTO: 0.97 K/UL
LYMPHOCYTES NFR BLD AUTO: 13.9 %
MAN DIFF?: NORMAL
MCHC RBC-ENTMCNC: 29.6 PG
MCHC RBC-ENTMCNC: 33.6 G/DL
MCV RBC AUTO: 88.1 FL
MONOCYTES # BLD AUTO: 0.78 K/UL
MONOCYTES NFR BLD AUTO: 11.1 %
NEUTROPHILS # BLD AUTO: 5.04 K/UL
NEUTROPHILS NFR BLD AUTO: 72.1 %
PLATELET # BLD AUTO: 463 K/UL
RBC # BLD: 2.43 M/UL
RBC # FLD: 19.6 %
WBC # FLD AUTO: 7 K/UL

## 2022-09-22 ENCOUNTER — APPOINTMENT (OUTPATIENT)
Dept: INFUSION THERAPY | Facility: CLINIC | Age: 77
End: 2022-09-22

## 2022-09-22 ENCOUNTER — APPOINTMENT (OUTPATIENT)
Dept: HEMATOLOGY ONCOLOGY | Facility: CLINIC | Age: 77
End: 2022-09-22

## 2022-09-22 VITALS
DIASTOLIC BLOOD PRESSURE: 73 MMHG | HEIGHT: 62 IN | TEMPERATURE: 97.8 F | BODY MASS INDEX: 25.4 KG/M2 | SYSTOLIC BLOOD PRESSURE: 162 MMHG | WEIGHT: 138 LBS | OXYGEN SATURATION: 96 % | HEART RATE: 127 BPM

## 2022-09-22 LAB
BASOPHILS # BLD AUTO: 0.08 K/UL
BASOPHILS NFR BLD AUTO: 1.6 %
EOSINOPHIL # BLD AUTO: 0.04 K/UL
EOSINOPHIL NFR BLD AUTO: 0.8 %
HCT VFR BLD CALC: 18.6 %
HGB BLD-MCNC: 6.2 G/DL
IMM GRANULOCYTES NFR BLD AUTO: 1.8 %
LYMPHOCYTES # BLD AUTO: 0.76 K/UL
LYMPHOCYTES NFR BLD AUTO: 14.8 %
MAN DIFF?: NORMAL
MCHC RBC-ENTMCNC: 29.4 PG
MCHC RBC-ENTMCNC: 33.3 G/DL
MCV RBC AUTO: 88.2 FL
MONOCYTES # BLD AUTO: 0.59 K/UL
MONOCYTES NFR BLD AUTO: 11.5 %
NEUTROPHILS # BLD AUTO: 3.58 K/UL
NEUTROPHILS NFR BLD AUTO: 69.5 %
PLATELET # BLD AUTO: 483 K/UL
RBC # BLD: 2.11 M/UL
RBC # FLD: 19.9 %
WBC # FLD AUTO: 5.14 K/UL

## 2022-09-22 PROCEDURE — 99214 OFFICE O/P EST MOD 30 MIN: CPT

## 2022-09-22 RX ORDER — PREGABALIN 225 MG/1
1000 CAPSULE ORAL ONCE
Refills: 0 | Status: COMPLETED | OUTPATIENT
Start: 2022-09-22 | End: 2022-09-22

## 2022-09-22 RX ORDER — ERYTHROPOIETIN 10000 [IU]/ML
80000 INJECTION, SOLUTION INTRAVENOUS; SUBCUTANEOUS ONCE
Refills: 0 | Status: COMPLETED | OUTPATIENT
Start: 2022-09-22 | End: 2022-09-22

## 2022-09-22 RX ORDER — LUSPATERCEPT 75 MG/1
110 INJECTION, POWDER, LYOPHILIZED, FOR SOLUTION SUBCUTANEOUS ONCE
Refills: 0 | Status: DISCONTINUED | OUTPATIENT
Start: 2022-09-22 | End: 2023-04-18

## 2022-09-22 RX ADMIN — ERYTHROPOIETIN 80000 UNIT(S): 10000 INJECTION, SOLUTION INTRAVENOUS; SUBCUTANEOUS at 14:46

## 2022-09-22 RX ADMIN — PREGABALIN 1000 MICROGRAM(S): 225 CAPSULE ORAL at 14:46

## 2022-09-23 ENCOUNTER — APPOINTMENT (OUTPATIENT)
Dept: INFUSION THERAPY | Facility: CLINIC | Age: 77
End: 2022-09-23

## 2022-09-23 LAB
ABO + RH PNL BLD: NORMAL
BLD GP AB SCN SERPL QL: NORMAL

## 2022-09-26 NOTE — HISTORY OF PRESENT ILLNESS
[de-identified] : She missed on appointment for procrit last week.\par She starts her next cycle on 6/25/18\par C/o back pain radiating down her left which occurred when she bent to  something.\par No change in diarrhea.\par \par 7/31/18:\par Doing well. On Revlimid for more than 2yrs, 5 mg 2 weeks on 1 week off. She will be starting week on tonight. \par C/o diarrhea. On Imodium 2 pills AM and 2 pills PM. Doesn't help much with diarrhea. \par C/o nausea, abdominal pain. \par \par \par Colonoscopy in 2016 was normal. \par Did not have blood transfusion for over 2 years. \par On procrit [id number] weekly. Missed last week on 7/24/18 as she was out of town. She has been non compliant with weekly Procrit.\par Mammogram in 2017 was normal. \par \par 9/11/18\par pt is here for follow up.\par She feels the lomotil helps with the diarrhea.\par She was away for a few weeks and missed her procrit injections.\par No fever, abdominal  discomfort has been less since since use of lomotil.\par She started a new cycle 2 days ago.\par \par 10/2/18:\par She will start Revlimid tonight (week on). 2 weeks on, 1 week off. \par On ASA 81mg. \par Denies fever, nausea, vomiting, chest pain, SOB, abdominal pain, and bladder problems.\par C/o diarrhea. \par S/p 2 units PRBC transfusion on 9/25/18. \par On Procrit 15501 units weekly. Not compliant every week. \par C/o intermittent dizziness. \par \par 10/31/18\par Pt is here for follow up.\par C/O significant fatigue.\par Continues to have diarrhea, takes imodium and lomotil as needed.\par C/o dry cough, no fever.\par Cough started a month ago. It is worse in the mornings however over last week it has been constant all day.\par No chest pain.\par She was found to have low B12 levels and was started on B12 injections.\par \par 11/27/18:\par Doing well. Hospitalized for 3 days for cellulitis/abcess of the back s/p drainage and on Abx currently. Developed 3 days after Mg infusion as per pt. \par Denies nausea, vomiting, chest pain, SOB, abdominal pain, bowel and bladder problems.\par This is the week on Revlimid (week 1).\par S/p 1 unit PRBC transfusion in the hospital. \par On Procrit weekly \par \par 12/27/18:\par Doing well. No major complaints.\par Denies fever, nausea, vomiting, chest pain, SOB, abdominal pain, and bladder problems.\par On Revlimid 5 mg 2 weeks on 1 week off. This is the week off. She will start the week on sunday (12/30/18).\par Due for Procrit 47417 units today. \par Still has diarrhea. 4-5bm/day.\par On monthly B12 injections. \par \par 1/30/19:\par Patient here for follow up for MDS.  She is taking Revlimid 5 mg, 2 weeks on, 1 week off.  She will complete this current cycle on Saturday.  She has no new complaints today.  Patient denies cough, shortness of breath, denies fever, denies bone pain.\par \par 03/04/2019\par Patient is here for a follow up visit. She complaints of severe pain in her left shoulder and has had abscess drained 2 weeks ago. However the pain is worse and she has purulent discharge on examination. She also has Nasal sores that are painful. Culture from 11/2018 showed MRSA.  Denies Fever. \par She also complaints of swelling in her right leg. Not tender and not erythematous and negative Gong;s sign. \par Her diarrhea with Revlimid is ongoing and Imodium and Lomotil helps but not everyday. \par She is on 60,000 units of procrit weekly and Revlimid 5 mg 2 weeks on 1 week off. \par \par 4/23/19\par Pt is here for follow up.\par She feels well.\par The nasal sores have improved with bactroban\par The abscess on left shoulder has resolved.\par However she has a new one on the middle of her back.\par No fever.\par Pt has been on procrit 44566 units weekly, however she missed a dose in between.\par \par 5/8/19\par pt is here for follow up.\par no new complaints.\par feels well.\par Her skin abscess has resolved.\par she has been unable to go to ID, as she could not get an appt.\par No bleeding.\par She is compliant with revlimid.\par \par 6/6/19\par Pt is here for follow up.\par no new complaints \par Feels well.\par Is on week 2 of her Revlimid cycle. \par No transfusions since last visit\par \par 7/17/19\par Pt is here for follow up.\par C/O fatigue.\par Was away for a few days two weeks ago, but missed treatment with procrit for 2 weeks.\par Is complaint with Revlimid 2 weeks on 1 week off.\par Diarrhea is a little improved.\par \par 8/14/19\par Patient here for follow up visit, feeling well.  Although she is complaining of some pain at the left scapula area recently.  Patient denies cough, shortness of breath, denies fever, denies other bone pain.  She is off Revlimid  this week, due to restart on Monday.  She is scheduled for Procrit and Vitamin B12 injection today.  She plans to leave the country tomorrow to visit her mother who is ill.  She will return in about 10 days.\par \par 9/11/19\par Pt is here for follow up.\par She was away for 3 weeks, out of which she took procrit for 2 weeks in [place].\par She missed last week's dose.\par She had CBCs in [place] which showed Hgb of 8.9\par She feels well.\par She still getting diarrhea.\par She has been using lomotil with very minimal relief.\par She started a new cycle of Revlimid 4 days ago.\par \par 10/3/19\par Patient here for follow up visit, feeling fairly well.   Patient denies cough, shortness of breath, denies fever, denies other bone pain.  She remains on Revlimid.  She is scheduled for Procrit and Vitamin B12 injection today.\par \par 10/24/19\par Pt is here for follow up.\par Feels well.\par No SOB, fatigue.\par Compliant with procrit and Revl;imid.\par Remains on ASa.\par Diarrhea is unchanged.\par Pt takes imodium as needed.\par \par 11/14/19\par Pt is here for follow up.\par No new complaints.\par Starts a new cycle of Revlimid today.\par Diarrhea is same as before, no rectal bleeding.\par No fever.\par \par 12/5/19\par Pt is here for follow up.\par No new complaints.\par Denies feeling weaker than usual.\par No fever, SOB.\par No change in frequency of diarrhea.\par No pain.\par Will start next cycle of Revlimid in 3 days.\par \par \par !/16/20\par Pt was recently DC[facility] 3 days ago after being treated for pneumonia and MARCO.\par She is on po Levaquin.\par She restarted Revlmid 3 days ago.\par She is feeling better now. No fever.\par No SOB, Chest pain and back pain has resolved.\par Pt has a cough, no expectoration.\par She also recd a unit of PRBCs last week in the hospital\par \par \par 1/30/20\par Patient here for follow up visit, feeling well.  She has no new complaints. Cough is almost gone completely.  Patient denies shortness of breath, denies fever, denies bone pain.  Her appetite is ok, weight is stable.  She is due to restart Revlimid on Monday.\par \par 02/20/20\par Pt is here for follow up, feeling well.\par Offers no new complaints. Denies SOB, fever, chills, weight loss, CP, cough. \par Currently off week of Revlimid 5 mg. Restarts on Monday.\par Has procrit 80,000 units today. Next b 12 injection due in 2 weeks \par Did not get chest Xray\par CBC reviewed WBC 3.1, h/h 8.3/25%, plt 386, neutrophils 1.29\par \par 3/12/20\par Pt is here for follow up.\par She took Revlimid for 2 weeks and then has been off for one week although she was advised to take it daily without break.\par No SOB, Cough, fever.\par Has mild fatigue.\par \par 04/02/2020\par SIMONA KUHN a 75 year F is here today for follow up visit of MDS.\par Denies fever, chills, cough,night sweats, weight loss. \par Denies bleeding or bruising.\par Pt receives procrit 80,000 units weekly and B12 IM monthly. \par Continued Revlimid 5 mg daily x 3 weeks, has 1 dose left tonight. \par CBC reviewed: WBC 3.2, H/H 8.1/25.2, neutrophils 1.6, PLT 72\par \par 4/20/2020: The patient is here for follow up . She has no complaints of fever, chills, dizziness , chest pain and shortness of breath . She is still with diarrhea , up to 10 watery BMs per day, responds poorly to imodium and lomotil. She is on Revlimid 5 mg daily , took last dose yesterday night. Her last Procrit was on April 13. \par \par 5/26/20\par Pt is here for follow up.\par She is feeling well. Denies SOB, chest pain, fever.\par Continues to have diarrhea although she is off of Revlmid.\par Thinks it may due to diabetic meds.\par Wants to discuss BM bx results\par \par 6/22/20\par Pt is here for follow up.\par Feels well. Denies any fever, N, V, D\par Continues to have diarrhea, she will talk to her PCP about changing metformin for DM.\par Has been needing PRBCs 1 unit each month\par \par 7/20/20\par Pt is here for follow up.\par No new complaints. Has been feeling well.\par No SOB, CP. \par No N, V, D.\par She has recd 2 units of PRBCs since May 20.\par \par 8/17/20\par Pt is here for a follow up visit.\par She is feeling well.\par No new symptoms. No N, V, D.\par No bruising or bleeding. She continues to need PRBCs every 2-3 weeks.\par \par 8/31/20\par Pt is here for follow up. She is feeling well. No N, V, D.\par She is tolerating the hydrea well. No SOB, CP\par No bruising ior bleeding\par \par 9/8/20\par Pt is here for follow up.\par She had been contacted by the [facility] last week to advise her to stop the hydrea. Pt did not check her messages and continued with Hydrea.\par No fever, N, V, bleeding.\par Saw Dr Ferrell last week.\par \par 9/14/20\par Pt is here for folow up.\par Besides her usual complaint of diarrhea she is feeling well.\par Recd 1 unit PRBC last week.\par Has stopped Hydrea.\par No fever, mouth sores.\par \par 9/29/20\par Pt is here for folow up.\par No new complaints.\par Is seeing GI for diarrhea net week.\par No fever, night sweats.\par REcd 3 units of PRBC this month\par \par 10/13/20\par Pt is here for follow up.\par No new complaints.\par Has not needed a transfusion since 3 weeks ago.\par Continues to have diarrhea, waiting to be seen by GI\par \par 10/26/20\par Pt is here for follow up.\par C/O feeling fatigued.\par Has CLARK.\par No nausea or vomiting.\par No fever.\par Diarrhea has improved a little. She is no longer on Metformin\par \par 11/9/20\par Pt is here for follow up.\par Feels well. Denies SOB, CP, fatigue\par \par 12/7/20- Patient is for follow up and is due for cycle 9 of Vidaza.  She still has loose BM sometimes 10 times a day and was seeing Dr. Corey from GI- did not follow up recently and is unable to get an appt with him. She has lost about 10 lbs since September. No N/V. No fever or chills. No CP/SOB. She has been getting PRBC transfusion every 3 weeks now\par \par 01/04/20 Pt presented today for f/u visit . She is s/p 8 cycles of vidaza and was started on Luspatercept in Dec , 2020 . So far she received 1 injection , she is due for 2nd injection today . Adverse effect profile was discussed with her in detail , she reports having some dizziness and weakness after first injection . She received blood transfusion last wk . Blood work from today reviewed as well and counts are adequate . She denies any fevers,  chills,  or any new symptoms . \par \par 1/25/21\par Pt is here for follow up.\par C/O diarrhea, otherwise feeling better.\par Has not needed a transfusion since 12/29\par \par 2/16/21\par Pt is here for follow up.\par Needed transfusion on 2/8/21.\par Continues to C/o fatigue. Diarrhea remains the same, has not made GI appt as yet.\par \par 3/8/21\par Pt is here for follow up.\par Feels better today. Recd 1 unit PRBCs last week.\par Diarrhea is same as before. has an appt with GI next week.\par No fever\par \par 3/30/21\par Pt is here for follow up.\par Feels better. Last transfusion was on 3/2/21\par Saw GI and was started on cholestyramine and metformin was DCED with resolution of diarrhea.\par She denies any SOB,\par Fatigue is better\par \par 4/20/2021\par Pt is here to f/u for MDS\par She is s/p Luspatercept cycle #6\par She is compliant with Aspirin\par She feels better today, appetite is good\par She denies shortness of breath, fatigue, or weakness \par She c/o of diarrhea but it has improved since she was started on Cholestyramine. Stool is soft and less in frequency\par She received COVID vaccine x 2 doses\par \par 5/11/21\par pt is here for follow up.\par Feels the same with fatigue, diarrhea.\par No SOB\par \par 6/21/21\par Pt is here for follow up.\par Feels well. No SOB, CP, N< V.\par Diarrhea is better controlled now.\par Last PRBC transfusion was 2 weeks ago.\par \par 7/19/21\par Pt is here for follow up.\par Feels a little tired, last transfusion was 6/1/21.\par No bleeding, fever, SOB\par \par 8/10/2021\par Patient is here to follow up for her MDS.\par She is on Luspatercept, tolerating well.\par She feels well today, the appetite is good.\par She denies shortness of breath, fatigue, fever, night sweats, abdominal pain, arthralgias/myalgias.\par \par 8/31/21\par Pt is here for follow up.\par C/O feeling very fatigued.\par No bleeding.\par No fever.\par \par 9/21/2021\par Patient is here to follow up for MDS.\par She is on Luspatercept and Retacrit, tolerating well.\par She gets blood transfusion as needed for Hgb <7 gm/dL\par She is c/o of fatigue, no shortness of breath, dizziness, chills or lightheadedness.\par She denies melena or hematochezia.\par Her appetite is good, no nausea/vomiting.\par \par 10/28/2021\par Patient is here to follow up for MDS.\par She is on Luspatercept and Retacrit, tolerating well.\par She gets blood transfusion to keep Hgb > 7 gm/dL.\par She is c/o of chronic fatigue, no shortness of breath, dizziness, chills or lightheadedness.\par She denies melena or hematochezia.\par Her appetite is good, no nausea/vomiting.\par She c/o of severe diarrhea, 10-12x/day watery stool while on Imodium in the last 2 weeks.\par Last colonoscopy was in 2016, benign as per patient.\par \par 11/18/21\par Pt is here for follow up. C/O fatigue. No SOB, CP\par \par 12/9/21\par Pt is here for follow up.\par Feels better today. Recd 2 units last week in ER.\par Planning to go to [place] for over a week and does not want to miss her procrit dose.\par Diarrhea is stable,\par \par 12/23/21\par Pt is here for follow up.\par Feels better. No SOB, CP. Going to [place] tomorrow. Has not been able to get Procrit injection from the pharmacy yet d/t insurance issues\par \par 2/3/22\par Pt is here for follow up. Feeling same as usual. No SOB, CP. Last transfusion was 2 weeks ago in ER>\par No bleeding reported\par \par 3/3/22\par Pt is here for follow up.\par C/O fatigue. Had black stools X2\par \par 3/30/22\par Pt is feeling well.\par Here for treatment and BM biopsy\par \par 4/21/22\par Pt is here fir follow up.\par Was in ER last week. Was give 2 units of PRBCs.\par Pt feels less tired today.\par Wants to discuss BM rslts\par \par 5/12/22\par Pt is here today for follow up visit.\par Pt c/o feeling tired.  Hgb=6.9.  One unit transfusion scheduled.\par \par 6/23/22\par Pt is here for follow up. Feels well today. No SOB, chest pain.\par Has not made appt with [facility]\par \par 7/14/22\par Pt is here for follow up for lowrisk myelodysplastic syndrome.\par She came in late today because she was not feeling well this morning- dizziness & weakness.\par She denies SOB, CP.\par \par 9/22/22\par Pt is here for follow up.\par C/O fatigue. was in the ER 3 weeks ago and recd 2 U PRBCS\par \par \par  [de-identified] : This is a 75 year old  female with history of MDS. She's was previously treated with Revlimid 5 mg, 2 weeks on, 1 week off.  \par She is also on Procrit.\par  She underwent colonoscopy (2/2017)  Results noted no colitis, only hyperplastic polyp noted. \par  \par

## 2022-09-26 NOTE — ASSESSMENT
[FreeTextEntry1] : Patient is a 77 year old female with # Lowrisk myelodysplastic syndrome, previously treated with Revlimid and Procrit and Vidaza . 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 \par Patient is s/p  9 cycles of Vidaza and she was requiring pRBC transfusion every 3 weeks \par Given the persistent transfusion dependence she was switched to Luspatercept.\par \par \par DETECTED GENOMIC ALTERATIONS:\par Tier III: Variants of Unknown Clinical Significance\par SF3B1 [id number]\par Hyperkalemia likely due to increased platelet; pseudohyperkalemia.\par \par PLAN:\par -- Continue Luspatercept 1.75 mg/kg every 3 weeks, \par --Continue Retacrit (Procrit)  80,000 units every week.\par Side effects of Procrit discussed including but not limited to: hypertension, headache, pruritus, nausea, vomiting, injection site pain, arthralgia, cough, fever.\par -- Monitor CBC weekly and administer 1 PRBC unit  as needed when Hgb < 7 gm/dL.  Pt has e/o iron overload so transfusions need to be kept at a minimum if possible\par \par Transfusion arranged for tomorrow\par \par # Vitamin  B 12 deficiency\par -- Continue Vitamin B 12 injection every month\par \par # Thrombocytosis\par -- S/P Hydrea\par -- Will continue to monitor.\par \par rslts of  BM biopsy d/w pt. No e/o increased blasts. Pt is needing PRBCs almost every 3 weeks. Advised to follow up with Dr Ferrell to consider any other option or clinical trial\par Labs reviewed, \par Called Dr Ferrell's office discussed case with her. No clinical trial available, Rec PRBCS \par \par RTC in 3 weeks\par \par \par \par

## 2022-09-26 NOTE — PHYSICAL EXAM
[Restricted in physically strenuous activity but ambulatory and able to carry out work of a light or sedentary nature] : Status 1- Restricted in physically strenuous activity but ambulatory and able to carry out work of a light or sedentary nature, e.g., light house work, office work [Normal] : affect appropriate [de-identified] : pallor

## 2022-09-29 ENCOUNTER — APPOINTMENT (OUTPATIENT)
Dept: INFUSION THERAPY | Facility: CLINIC | Age: 77
End: 2022-09-29

## 2022-09-29 ENCOUNTER — NON-APPOINTMENT (OUTPATIENT)
Age: 77
End: 2022-09-29

## 2022-09-30 ENCOUNTER — APPOINTMENT (OUTPATIENT)
Dept: INFUSION THERAPY | Facility: CLINIC | Age: 77
End: 2022-09-30

## 2022-09-30 RX ORDER — ERYTHROPOIETIN 10000 [IU]/ML
80000 INJECTION, SOLUTION INTRAVENOUS; SUBCUTANEOUS ONCE
Refills: 0 | Status: COMPLETED | OUTPATIENT
Start: 2022-09-30 | End: 2022-09-30

## 2022-09-30 RX ADMIN — ERYTHROPOIETIN 80000 UNIT(S): 10000 INJECTION, SOLUTION INTRAVENOUS; SUBCUTANEOUS at 15:13

## 2022-10-03 LAB
BASOPHILS # BLD AUTO: 0.08 K/UL
BASOPHILS NFR BLD AUTO: 1.2 %
EOSINOPHIL # BLD AUTO: 0.07 K/UL
EOSINOPHIL NFR BLD AUTO: 1 %
HCT VFR BLD CALC: 24.9 %
HGB BLD-MCNC: 8.3 G/DL
IMM GRANULOCYTES NFR BLD AUTO: 1.9 %
LYMPHOCYTES # BLD AUTO: 1.17 K/UL
LYMPHOCYTES NFR BLD AUTO: 17 %
MAN DIFF?: NORMAL
MCHC RBC-ENTMCNC: 28.4 PG
MCHC RBC-ENTMCNC: 33.3 G/DL
MCV RBC AUTO: 85.3 FL
MONOCYTES # BLD AUTO: 0.66 K/UL
MONOCYTES NFR BLD AUTO: 9.6 %
NEUTROPHILS # BLD AUTO: 4.78 K/UL
NEUTROPHILS NFR BLD AUTO: 69.3 %
PLATELET # BLD AUTO: 388 K/UL
RBC # BLD: 2.92 M/UL
RBC # FLD: 19 %
WBC # FLD AUTO: 6.89 K/UL

## 2022-10-06 ENCOUNTER — APPOINTMENT (OUTPATIENT)
Dept: INFUSION THERAPY | Facility: CLINIC | Age: 77
End: 2022-10-06

## 2022-10-07 ENCOUNTER — APPOINTMENT (OUTPATIENT)
Dept: INFUSION THERAPY | Facility: CLINIC | Age: 77
End: 2022-10-07

## 2022-10-07 RX ORDER — ERYTHROPOIETIN 10000 [IU]/ML
80000 INJECTION, SOLUTION INTRAVENOUS; SUBCUTANEOUS ONCE
Refills: 0 | Status: COMPLETED | OUTPATIENT
Start: 2022-10-07 | End: 2022-10-07

## 2022-10-07 RX ADMIN — ERYTHROPOIETIN 80000 UNIT(S): 10000 INJECTION, SOLUTION INTRAVENOUS; SUBCUTANEOUS at 16:40

## 2022-10-08 NOTE — H&P ADULT - MINUTES
Patient has c/o bilateral lower leg swelling that is worse on the left. Sent to rule out DVT vs CHF. Type 2 DM: 
71

## 2022-10-11 LAB
BASOPHILS # BLD AUTO: 0.09 K/UL
BASOPHILS NFR BLD AUTO: 1.3 %
EOSINOPHIL # BLD AUTO: 0.05 K/UL
EOSINOPHIL NFR BLD AUTO: 0.7 %
HCT VFR BLD CALC: 21.2 %
HGB BLD-MCNC: 7.1 G/DL
IMM GRANULOCYTES NFR BLD AUTO: 1.7 %
LYMPHOCYTES # BLD AUTO: 1.73 K/UL
LYMPHOCYTES NFR BLD AUTO: 24.4 %
MAN DIFF?: NORMAL
MCHC RBC-ENTMCNC: 28.9 PG
MCHC RBC-ENTMCNC: 33.5 G/DL
MCV RBC AUTO: 86.2 FL
MONOCYTES # BLD AUTO: 1.05 K/UL
MONOCYTES NFR BLD AUTO: 14.8 %
NEUTROPHILS # BLD AUTO: 4.04 K/UL
NEUTROPHILS NFR BLD AUTO: 57.1 %
PLATELET # BLD AUTO: 505 K/UL
RBC # BLD: 2.46 M/UL
RBC # FLD: 19.7 %
WBC # FLD AUTO: 7.08 K/UL

## 2022-10-13 ENCOUNTER — APPOINTMENT (OUTPATIENT)
Dept: INFUSION THERAPY | Facility: CLINIC | Age: 77
End: 2022-10-13

## 2022-10-13 ENCOUNTER — APPOINTMENT (OUTPATIENT)
Dept: HEMATOLOGY ONCOLOGY | Facility: CLINIC | Age: 77
End: 2022-10-13

## 2022-10-14 ENCOUNTER — APPOINTMENT (OUTPATIENT)
Dept: INFUSION THERAPY | Facility: CLINIC | Age: 77
End: 2022-10-14

## 2022-10-19 NOTE — ED CLERICAL - NSCLERICAL TASK_GEN_ALL_ED
PRE-OPERATIVE INSTRUCTIONS:      Assuming the labs from today all look good, you are cleared for your surgery.     *  Contact your surgeon if there is any change in your health. This includes signs of new infection, such as cold or flu (such as a sore throat, runny nose, cough, rash or fever).  Elective surgeries may need to be postponed if there is significant illness present.    *  Confirm any Covid testing requirements before your procedure.   Be aware that each surgery facility has their specific Covid testing requirements.  Many surgery facilities require Covid testing within 2-4 days of the surgery.  Typically the surgeon's office is responsible for arranging and completing this testing before surgery.    Follow any instructions you have been given.  Contact the surgery office or surgery center for questions about Covid testing requirements.      *  Covid testing may be performed at most [facility] if needed.     --[facility] Covid Scheduling number: [phone]   (to arrange Covid testing and/or Covid vaccine appointments within the [facility] system)      *  Stop aspirin of any kind for 7 days before procedure.   (even low dose daily aspirin).    *  No NSAIDs (Motrin, Advil, ibuprofen, Aleve, etc) within 5-7 days of surgery.    *  Stop any Fish Oil or multi vitamin (and specifically anything containing vitamin E) supplements for 7 days prior to surgery because these can affect platelet function.      *  Tylenol (acetaminophen) is OK to take if needed, this medication has no effect on platelet function.  Follow instructions on the bottle    *  Prepare your body as instructed by the surgery clinic.  If instructed, Take a shower or bath the night before surgery. Use any special soaps or cleaning instructions according to instructions from your surgeon.  If you do not have soap from your surgeon, use your regular soap. Do not shave or scrub the surgery site.  Wear clean pajamas  observation/Other Documents and have clean sheets on your bed     *  ON THE MORNING OF SURGERY:     --Do NOT take  Furosemide     --OK to take all other medications with a small sip of water.     *  Resume all medications at the same doses after surgery, unless instructed otherwise by the medical staff.     *  Attend all follow up appointments with the surgeons (and/or therapists if applicable) as instructed.     *  Contact the surgeon's office for any specific questions about after-surgery cares and follow up instructions.      *  You do not need to necessarily return to see us after your surgery unless instructed to do so.        IF YOU REQUIRE NARCOTIC PAIN MEDICATION AFTER YOUR SURGERY:    --Take the narcotic pain medication exactly as prescribed, and use the absolute lowest dose needed for pain control.   The goal of pain medication is not complete pain relief, aim to just make it manageable.    --Beware of drowsiness, nausea, vomiting, when taking this medication.  Do not drive, or operate dangerous equipment after taking this.    --The main side effects from narcotic pain medication can also include intestinal side effects including nausea, vomiting, constipation, or diarrhea.    --If your pain is not able to be controlled with the pain medication supplied to you, contact the surgeons because uncontrolled pain can be a sign of possible surgical complications.    --In case of constipation from pain medications, take over the counter Miralax powder or stool softner Senokot.  If you have a history of constipation with narcotic pain medication, consider taking Miralax powder on any day that you take a pain tablet.

## 2022-10-20 ENCOUNTER — NON-APPOINTMENT (OUTPATIENT)
Age: 77
End: 2022-10-20

## 2022-10-20 ENCOUNTER — APPOINTMENT (OUTPATIENT)
Dept: INFUSION THERAPY | Facility: CLINIC | Age: 77
End: 2022-10-20

## 2022-10-20 RX ORDER — PREGABALIN 225 MG/1
1000 CAPSULE ORAL ONCE
Refills: 0 | Status: COMPLETED | OUTPATIENT
Start: 2022-10-20 | End: 2022-10-20

## 2022-10-20 RX ORDER — LUSPATERCEPT 75 MG/1
110 INJECTION, POWDER, LYOPHILIZED, FOR SOLUTION SUBCUTANEOUS ONCE
Refills: 0 | Status: COMPLETED | OUTPATIENT
Start: 2022-10-20 | End: 2022-10-20

## 2022-10-20 RX ORDER — ERYTHROPOIETIN 10000 [IU]/ML
80000 INJECTION, SOLUTION INTRAVENOUS; SUBCUTANEOUS ONCE
Refills: 0 | Status: COMPLETED | OUTPATIENT
Start: 2022-10-20 | End: 2022-10-20

## 2022-10-20 RX ADMIN — LUSPATERCEPT 110 MILLIGRAM(S): 75 INJECTION, POWDER, LYOPHILIZED, FOR SOLUTION SUBCUTANEOUS at 15:15

## 2022-10-20 RX ADMIN — PREGABALIN 1000 MICROGRAM(S): 225 CAPSULE ORAL at 15:16

## 2022-10-20 RX ADMIN — ERYTHROPOIETIN 80000 UNIT(S): 10000 INJECTION, SOLUTION INTRAVENOUS; SUBCUTANEOUS at 15:15

## 2022-10-21 ENCOUNTER — APPOINTMENT (OUTPATIENT)
Dept: INFUSION THERAPY | Facility: CLINIC | Age: 77
End: 2022-10-21

## 2022-10-21 LAB
ABO + RH PNL BLD: NORMAL
BLD GP AB SCN SERPL QL: NORMAL

## 2022-10-27 ENCOUNTER — APPOINTMENT (OUTPATIENT)
Dept: INFUSION THERAPY | Facility: CLINIC | Age: 77
End: 2022-10-27

## 2022-10-27 RX ORDER — ERYTHROPOIETIN 10000 [IU]/ML
80000 INJECTION, SOLUTION INTRAVENOUS; SUBCUTANEOUS ONCE
Refills: 0 | Status: COMPLETED | OUTPATIENT
Start: 2022-10-27 | End: 2022-10-27

## 2022-10-27 RX ADMIN — ERYTHROPOIETIN 80000 UNIT(S): 10000 INJECTION, SOLUTION INTRAVENOUS; SUBCUTANEOUS at 17:15

## 2022-10-31 LAB
BASOPHILS # BLD AUTO: 0.08 K/UL
BASOPHILS NFR BLD AUTO: 1.2 %
EOSINOPHIL # BLD AUTO: 0.04 K/UL
EOSINOPHIL NFR BLD AUTO: 0.6 %
HCT VFR BLD CALC: 21.5 %
HGB BLD-MCNC: 7.1 G/DL
IMM GRANULOCYTES NFR BLD AUTO: 1.4 %
LYMPHOCYTES # BLD AUTO: 1.08 K/UL
LYMPHOCYTES NFR BLD AUTO: 16.4 %
MAN DIFF?: NORMAL
MCHC RBC-ENTMCNC: 29.2 PG
MCHC RBC-ENTMCNC: 33 G/DL
MCV RBC AUTO: 88.5 FL
MONOCYTES # BLD AUTO: 0.79 K/UL
MONOCYTES NFR BLD AUTO: 12 %
NEUTROPHILS # BLD AUTO: 4.5 K/UL
NEUTROPHILS NFR BLD AUTO: 68.4 %
PLATELET # BLD AUTO: 405 K/UL
RBC # BLD: 2.43 M/UL
RBC # FLD: 19.4 %
WBC # FLD AUTO: 6.58 K/UL

## 2022-11-01 ENCOUNTER — APPOINTMENT (OUTPATIENT)
Dept: HEMATOLOGY ONCOLOGY | Facility: CLINIC | Age: 77
End: 2022-11-01

## 2022-11-01 ENCOUNTER — APPOINTMENT (OUTPATIENT)
Dept: INFUSION THERAPY | Facility: CLINIC | Age: 77
End: 2022-11-01

## 2022-11-03 ENCOUNTER — APPOINTMENT (OUTPATIENT)
Dept: INFUSION THERAPY | Facility: CLINIC | Age: 77
End: 2022-11-03

## 2022-11-04 ENCOUNTER — INPATIENT (INPATIENT)
Facility: HOSPITAL | Age: 77
LOS: 1 days | Discharge: HOME | End: 2022-11-06
Attending: HOSPITALIST | Admitting: HOSPITALIST

## 2022-11-04 ENCOUNTER — APPOINTMENT (OUTPATIENT)
Dept: INFUSION THERAPY | Facility: CLINIC | Age: 77
End: 2022-11-04

## 2022-11-04 VITALS
SYSTOLIC BLOOD PRESSURE: 130 MMHG | WEIGHT: 134.92 LBS | TEMPERATURE: 98 F | RESPIRATION RATE: 18 BRPM | DIASTOLIC BLOOD PRESSURE: 70 MMHG | OXYGEN SATURATION: 100 % | HEART RATE: 94 BPM

## 2022-11-04 LAB
ALBUMIN SERPL ELPH-MCNC: 4.3 G/DL — SIGNIFICANT CHANGE UP (ref 3.5–5.2)
ALP SERPL-CCNC: 155 U/L — HIGH (ref 30–115)
ALT FLD-CCNC: 32 U/L — SIGNIFICANT CHANGE UP (ref 0–41)
ANION GAP SERPL CALC-SCNC: 11 MMOL/L — SIGNIFICANT CHANGE UP (ref 7–14)
ANISOCYTOSIS BLD QL: SIGNIFICANT CHANGE UP
APTT BLD: 29.1 SEC — SIGNIFICANT CHANGE UP (ref 27–39.2)
AST SERPL-CCNC: 22 U/L — SIGNIFICANT CHANGE UP (ref 0–41)
BASE EXCESS BLDV CALC-SCNC: -9.6 MMOL/L — LOW (ref -2–3)
BASOPHILS # BLD AUTO: 0.07 K/UL
BASOPHILS # BLD AUTO: 0.08 K/UL — SIGNIFICANT CHANGE UP (ref 0–0.2)
BASOPHILS NFR BLD AUTO: 1.1 %
BASOPHILS NFR BLD AUTO: 1.7 % — HIGH (ref 0–1)
BILIRUB SERPL-MCNC: 0.4 MG/DL — SIGNIFICANT CHANGE UP (ref 0.2–1.2)
BLD GP AB SCN SERPL QL: SIGNIFICANT CHANGE UP
BUN SERPL-MCNC: 36 MG/DL — HIGH (ref 10–20)
CA-I SERPL-SCNC: 1.35 MMOL/L — HIGH (ref 1.15–1.33)
CALCIUM SERPL-MCNC: 9.3 MG/DL — SIGNIFICANT CHANGE UP (ref 8.4–10.4)
CHLORIDE SERPL-SCNC: 101 MMOL/L — SIGNIFICANT CHANGE UP (ref 98–110)
CO2 SERPL-SCNC: 16 MMOL/L — LOW (ref 17–32)
CREAT SERPL-MCNC: 1.4 MG/DL — SIGNIFICANT CHANGE UP (ref 0.7–1.5)
EGFR: 39 ML/MIN/1.73M2 — LOW
EOSINOPHIL # BLD AUTO: 0 K/UL — SIGNIFICANT CHANGE UP (ref 0–0.7)
EOSINOPHIL # BLD AUTO: 0.03 K/UL
EOSINOPHIL NFR BLD AUTO: 0 % — SIGNIFICANT CHANGE UP (ref 0–8)
EOSINOPHIL NFR BLD AUTO: 0.5 %
GAS PNL BLDV: 130 MMOL/L — LOW (ref 136–145)
GAS PNL BLDV: SIGNIFICANT CHANGE UP
GIANT PLATELETS BLD QL SMEAR: PRESENT — SIGNIFICANT CHANGE UP
GLUCOSE SERPL-MCNC: 350 MG/DL — HIGH (ref 70–99)
HCO3 BLDV-SCNC: 16 MMOL/L — LOW (ref 22–29)
HCT VFR BLD CALC: 18.1 % — LOW (ref 37–47)
HCT VFR BLD CALC: 19.8 %
HCT VFR BLDA CALC: 20 % — CRITICAL LOW (ref 39–51)
HGB BLD CALC-MCNC: 6.6 G/DL — CRITICAL LOW (ref 12.6–17.4)
HGB BLD-MCNC: 6 G/DL — CRITICAL LOW (ref 12–16)
HGB BLD-MCNC: 6.4 G/DL
IMM GRANULOCYTES NFR BLD AUTO: 1.1 %
INR BLD: 1.08 RATIO — SIGNIFICANT CHANGE UP (ref 0.65–1.3)
LACTATE BLDV-MCNC: 2.9 MMOL/L — HIGH (ref 0.5–2)
LYMPHOCYTES # BLD AUTO: 0.86 K/UL
LYMPHOCYTES # BLD AUTO: 1.17 K/UL — LOW (ref 1.2–3.4)
LYMPHOCYTES # BLD AUTO: 26.1 % — SIGNIFICANT CHANGE UP (ref 20.5–51.1)
LYMPHOCYTES NFR BLD AUTO: 13.2 %
MAN DIFF?: NORMAL
MANUAL SMEAR VERIFICATION: SIGNIFICANT CHANGE UP
MCHC RBC-ENTMCNC: 29.5 PG
MCHC RBC-ENTMCNC: 29.7 PG — SIGNIFICANT CHANGE UP (ref 27–31)
MCHC RBC-ENTMCNC: 32.3 G/DL
MCHC RBC-ENTMCNC: 33.1 G/DL — SIGNIFICANT CHANGE UP (ref 32–37)
MCV RBC AUTO: 89.6 FL — SIGNIFICANT CHANGE UP (ref 81–99)
MCV RBC AUTO: 91.2 FL
MICROCYTES BLD QL: SLIGHT — SIGNIFICANT CHANGE UP
MONOCYTES # BLD AUTO: 0.39 K/UL — SIGNIFICANT CHANGE UP (ref 0.1–0.6)
MONOCYTES # BLD AUTO: 0.93 K/UL
MONOCYTES NFR BLD AUTO: 14.2 %
MONOCYTES NFR BLD AUTO: 8.7 % — SIGNIFICANT CHANGE UP (ref 1.7–9.3)
NEUTROPHILS # BLD AUTO: 2.84 K/UL — SIGNIFICANT CHANGE UP (ref 1.4–6.5)
NEUTROPHILS # BLD AUTO: 4.57 K/UL
NEUTROPHILS NFR BLD AUTO: 63.5 % — SIGNIFICANT CHANGE UP (ref 42.2–75.2)
NEUTROPHILS NFR BLD AUTO: 69.9 %
NRBC # BLD: 2 /100 — HIGH (ref 0–0)
NRBC # BLD: SIGNIFICANT CHANGE UP /100 WBCS (ref 0–0)
OVALOCYTES BLD QL SMEAR: SLIGHT — SIGNIFICANT CHANGE UP
PCO2 BLDV: 36 MMHG — LOW (ref 39–42)
PH BLDV: 7.27 — LOW (ref 7.32–7.43)
PLAT MORPH BLD: NORMAL — SIGNIFICANT CHANGE UP
PLATELET # BLD AUTO: 376 K/UL — SIGNIFICANT CHANGE UP (ref 130–400)
PLATELET # BLD AUTO: 457 K/UL
PO2 BLDV: 27 MMHG — SIGNIFICANT CHANGE UP
POIKILOCYTOSIS BLD QL AUTO: SLIGHT — SIGNIFICANT CHANGE UP
POLYCHROMASIA BLD QL SMEAR: SLIGHT — SIGNIFICANT CHANGE UP
POTASSIUM BLDV-SCNC: 5.4 MMOL/L — HIGH (ref 3.5–5.1)
POTASSIUM SERPL-MCNC: 5.9 MMOL/L — HIGH (ref 3.5–5)
POTASSIUM SERPL-SCNC: 5.9 MMOL/L — HIGH (ref 3.5–5)
PROT SERPL-MCNC: 6.4 G/DL — SIGNIFICANT CHANGE UP (ref 6–8)
PROTHROM AB SERPL-ACNC: 12.3 SEC — SIGNIFICANT CHANGE UP (ref 9.95–12.87)
RBC # BLD: 2.02 M/UL — LOW (ref 4.2–5.4)
RBC # BLD: 2.17 M/UL
RBC # FLD: 19.7 % — HIGH (ref 11.5–14.5)
RBC # FLD: 19.8 %
RBC BLD AUTO: NORMAL — SIGNIFICANT CHANGE UP
SAO2 % BLDV: 40 % — SIGNIFICANT CHANGE UP
SODIUM SERPL-SCNC: 128 MMOL/L — LOW (ref 135–146)
WBC # BLD: 4.47 K/UL — LOW (ref 4.8–10.8)
WBC # FLD AUTO: 4.47 K/UL — LOW (ref 4.8–10.8)
WBC # FLD AUTO: 6.53 K/UL

## 2022-11-04 PROCEDURE — 93010 ELECTROCARDIOGRAM REPORT: CPT

## 2022-11-04 PROCEDURE — 99285 EMERGENCY DEPT VISIT HI MDM: CPT

## 2022-11-04 RX ORDER — ERYTHROPOIETIN 10000 [IU]/ML
80000 INJECTION, SOLUTION INTRAVENOUS; SUBCUTANEOUS ONCE
Refills: 0 | Status: COMPLETED | OUTPATIENT
Start: 2022-11-04 | End: 2022-11-04

## 2022-11-04 RX ORDER — SODIUM ZIRCONIUM CYCLOSILICATE 10 G/10G
5 POWDER, FOR SUSPENSION ORAL ONCE
Refills: 0 | Status: COMPLETED | OUTPATIENT
Start: 2022-11-04 | End: 2022-11-04

## 2022-11-04 RX ADMIN — ERYTHROPOIETIN 80000 UNIT(S): 10000 INJECTION, SOLUTION INTRAVENOUS; SUBCUTANEOUS at 15:26

## 2022-11-04 RX ADMIN — SODIUM ZIRCONIUM CYCLOSILICATE 5 GRAM(S): 10 POWDER, FOR SUSPENSION ORAL at 21:51

## 2022-11-04 NOTE — H&P ADULT - NSHPPHYSICALEXAM_GEN_ALL_CORE
GENERAL: NAD, speaks in full sentences, no signs of respiratory distress  HEAD:  Atraumatic, Normocephalic  EYES: EOMI, PERRLA, conjunctiva and sclera clear  NECK: Supple, No JVD  CHEST/LUNG: Clear to auscultation bilaterally; No wheeze; No crackles; No accessory muscles used  HEART: Regular rate and rhythm; No murmurs;   ABDOMEN: Soft, Nontender, Nondistended; Bowel sounds present; No guarding  EXTREMITIES:  2+ Peripheral Pulses, No cyanosis or edema  PSYCH: AAOx3  NEUROLOGY: non-focal  SKIN: No rashes or lesions GENERAL: NAD, speaks in full sentences, no signs of respiratory distress  HEAD:  Atraumatic, Normocephalic  EYES: EOMI, conjunctiva and sclera clear  NECK: Supple  CHEST/LUNG: Clear to auscultation bilaterally; No wheeze; No crackles; No accessory muscles used  HEART: S1S2, RRR  ABDOMEN: Soft, Nontender, Nondistended  EXTREMITIES: No cyanosis or edema  NEUROLOGY: AAOx3

## 2022-11-04 NOTE — H&P ADULT - ATTENDING COMMENTS
77yoF with pmhx DM, CKD3,and MDS followed by Dr. Wade, requires transfusions every few weeks, sent for transfusion. Reports experiencing generalized weakness similar to prev episodes. Denies fever/chill/HA/dizziness/chest pain/palpitation/sob/abd pain/n/v/ black stool/bloody stool/urinary sxs. Pt does report diarrhea that has been ongoing since starting her medication 3 years ago which she uses OTC meds to treat at home.     #Chronic Anemia 2/2 MDS  - follows with Dr. Wade as OP   - Luspaterecept every 3 weeks  - procrit q weekly   - requiring pRBCs transfusions almost every 3 weeks  - s/p 2 units of prBCS in ED  - keep active type and screen. Transfuse as needed  - supportive care     #Non anion gap metabolic acidosis 2/2 Diarrhea   #Hyperkalemia   - rule out C diff, if negative start Imodium 4mg q6h   - trend bmp   - start sodium bicarbonate 1300mg TID   - low K Diet    #DM poorly controlled  - c/w insulin     #CKD III  - Cr at baseline   - avoid nephrotoxic drugs     DVT: Heparin     Pending: hyperkalemia, acidosis, diarrhea improvement   Plan of care d/w patient

## 2022-11-04 NOTE — ED PROVIDER NOTE - BIRTH SEX
Female Infliximab Counseling:  I discussed with the patient the risks of infliximab including but not limited to myelosuppression, immunosuppression, autoimmune hepatitis, demyelinating diseases, lymphoma, and serious infections.  The patient understands that monitoring is required including a PPD at baseline and must alert us or the primary physician if symptoms of infection or other concerning signs are noted.

## 2022-11-04 NOTE — ED PROVIDER NOTE - CARE PLAN
1 Principal Discharge DX:	Anemia  Secondary Diagnosis:	Hyperkalemia  Secondary Diagnosis:	Hyponatremia  Secondary Diagnosis:	CKD (chronic kidney disease)

## 2022-11-04 NOTE — ED PROVIDER NOTE - ATTENDING APP SHARED VISIT CONTRIBUTION OF CARE
77-year-old female with history as documented referred to the ED for transfusion.  Patient endorses generalized weakness.  During previous presentations patient has had metabolic acidosis requiring admission.  Denies any fevers chest pain shortness of breath.  Patient denies any melena and or bright red blood per rectum.     vss, nontoxic, well appearing, pink conj, anicteric, MMM, neck supple, CTAB, RRR, equal radial pulses bilat, abd soft/nt/nd, no cva tend. no calves tend, no edema, no fnd. no rashes.

## 2022-11-04 NOTE — H&P ADULT - HISTORY OF PRESENT ILLNESS
77 F with pmhx DM, CKD3,and MDS followed by , requires transfusions every few weeks, sent for transfusion today; experiencing generalized weakness similar to prev episodes. Denies fever/chill/HA/dizziness/chest pain/palpitation/sob/abd pain/n/v/d/ black stool/bloody stool/urinary sxs.    In the ED, pt hemodynamically stable. Lab significant for Hb of 6.0, K 5.9(rpt on VBG 5.4), Na 128, lactate 2.9.     s/p 2u pRBC and lokelma in the ED 77yoF with pmhx DM, CKD3,and MDS followed by Dr. Wade, requires transfusions every few weeks, sent for transfusion today; experiencing generalized weakness similar to prev episodes. Denies fever/chill/HA/dizziness/chest pain/palpitation/sob/abd pain/n/v/ black stool/bloody stool/urinary sxs. Pt does report diarrhea that has been ongoing since starting her medication 3 years ago which she uses OTC meds to treat at home.     In the ED, pt hemodynamically stable. Lab significant for Hb of 6.0, K 5.9(rpt on VBG 5.4), Na 128, lactate 2.9.   CXR completed - looks wnl (pending official read)    s/p 2u pRBC and lokelma in the ED

## 2022-11-04 NOTE — H&P ADULT - NSHPLABSRESULTS_GEN_ALL_CORE
6.0    4.47  )-----------( 376      ( 04 Nov 2022 18:55 )             18.1       11-04    128<L>  |  101  |  36<H>  ----------------------------<  350<H>  5.9<H>   |  16<L>  |  1.4    Ca    9.3      04 Nov 2022 18:55    TPro  6.4  /  Alb  4.3  /  TBili  0.4  /  DBili  x   /  AST  22  /  ALT  32  /  AlkPhos  155<H>  11-04                  PT/INR - ( 04 Nov 2022 18:55 )   PT: 12.30 sec;   INR: 1.08 ratio         PTT - ( 04 Nov 2022 18:55 )  PTT:29.1 sec    Lactate Trend            CAPILLARY BLOOD GLUCOSE

## 2022-11-04 NOTE — ED ADULT NURSE NOTE - CHIEF COMPLAINT QUOTE
Patient a+ox4 sent over from cancer [place] co generalized weakness over last 2 days, endorses blood work done at [facility] shows below 6. Pt with hx of anemia.

## 2022-11-04 NOTE — H&P ADULT - ASSESSMENT
Acute on Chronic Anemia 2/2 to MDS  - Hgb 5.4 on admission   - hemodynamically stable   - KAREN negative  - colo 2017 normal   - follows with Dr. Wade as OP   - s/p 9 cycles of vidaza   - Luspaterecept every 3 weeks  - procrit (09607 unit) every week  - requiring pRBCs transfusions almost every 3 weeks  - s/p 2 units of pRBCS in ED  - was seen in heme office on 8/4 and plan was to follow up with Dr Ferrell for any possible enrollment in a clinical trial as pt is requiring frequent transfusions  - keep active type and screen. Transfuse as needed  - follow up with heme onc as OP      DM poorly controlled  - Glucose 532 on admission repeated   - BHB negative, AG normal, bicarb nrl   - will start on lantus 12 and lispro 4 TID adjust according to FS  - A1C     Hyperkalemia   - pt known to be hyperkalemic likely multifactorial  - K 5.8 on admission, no ekg changes   - s/p lokelma 5 once     CKD III  - Cr at baseline   - avoid nephrotoxic drugs     DVT: SCD  DASH diet  IAT   Acute on Chronic Anemia 2/2 to MDS  - Hgb 6.0 on admission   - hemodynamically stable   - colo 2017 normal   - follows with Dr. Wade as OP   - s/p 9 cycles of vidaza   - Luspaterecept every 3 weeks  - procrit (83755 unit) every week  - requiring pRBCs transfusions almost every 3 weeks  - s/p 2 units of prBCS in ED  - was seen in heme office on 8/4 and plan was to follow up with Dr Ferrell for any possible enrollment in a clinical trial as pt is requiring frequent transfusions  - keep active type and screen. Transfuse as needed  - follow up with heme onc as OP      DM poorly controlled  - Glucose 532 on admission repeated   - BHB negative, AG normal, bicarb nrl   - will start on lantus 12 and lispro 4 TID adjust according to FS  - A1C     Hyperkalemia   - pt known to be hyperkalemic likely multifactorial  - K 5.8 on admission, no ekg changes   - s/p lokelma 5 once     CKD III  - Cr at baseline   - avoid nephrotoxic drugs     DVT: SCD  DASH diet  IAT   77 F with pmhx DM, CKD3,and MDS followed by , requires transfusions every few weeks, sent for transfusion today; experiencing generalized weakness similar to prev episodes. Denies fever/chill/HA/dizziness/chest pain/palpitation/sob/abd pain/n/v/d/ black stool/bloody stool/urinary sxs.    In the ED, pt hemodynamically stable. Lab significant for Hb of 6.0, K 5.9(rpt on VBG 5.4), Na 128, lactate 2.9.     s/p 2u pRBC and lokelma in the ED    #Acute on Chronic Anemia 2/2 to MDS  - Hgb 6.0 on admission   - hemodynamically stable   - colo 2017 normal   - follows with Dr. Wade as OP   - s/p 9 cycles of vidaza   - Luspaterecept every 3 weeks  - procrit (06442 unit) every week  - requiring pRBCs transfusions almost every 3 weeks  - s/p 2 units of prBCS in ED  - keep active type and screen. Transfuse as needed  - follow up with heme onc as OP      #DM poorly controlled  - Glucose 350 on adm  - Keep FS<180  - Start lantus 12 and lispro 4 TID + SS    #Hyperkalemia   - pt known to be hyperkalemic likely multifactorial  - K 5.4 on admission, no ekg changes   - s/p lokelma 5 once   -low K Diet    #CKD III  - Cr at baseline   - avoid nephrotoxic drugs     DVT: SCD  DASH diet  IAT   77yoF with pmhx DM, CKD3,and MDS followed by Dr. Wade, requires transfusions every few weeks, sent for transfusion today; experiencing generalized weakness similar to prev episodes. Denies fever/chill/HA/dizziness/chest pain/palpitation/sob/abd pain/n/v/ black stool/bloody stool/urinary sxs. Pt does report diarrhea that has been ongoing since starting her medication 3 years ago which she uses OTC meds to treat at home.     In the ED, pt hemodynamically stable. Lab significant for Hb of 6.0, K 5.9(rpt on VBG 5.4), Na 128, lactate 2.9.     s/p 2u pRBC and lokelma in the ED    #Acute on Chronic Anemia 2/2 to MDS  - Hgb 6.0 on admission   - hemodynamically stable   - colo 2017 normal   - follows with Dr. Wade as OP   - s/p 9 cycles of vidaza   - Luspaterecept every 3 weeks  - procrit (46727 unit) every week  - requiring pRBCs transfusions almost every 3 weeks  - s/p 2 units of prBCS in ED  - keep active type and screen. Transfuse as needed  - follow up with heme onc as OP      #DM poorly controlled  - Glucose 350 on adm  - Keep FS<180  - Start lantus 12 and lispro 4 TID + SS    #Hyperkalemia   - pt known to be hyperkalemic likely multifactorial  - K 5.4 on admission, no ekg changes   - s/p lokelma 5 once   - f/u am labs  - low K Diet    #CKD III  - Cr at baseline   - avoid nephrotoxic drugs     DVT: SCD  DASH diet  IAT   77yoF with pmhx DM, CKD3,and MDS followed by Dr. Wade, requires transfusions every few weeks, sent for transfusion today; experiencing generalized weakness similar to prev episodes. Denies fever/chill/HA/dizziness/chest pain/palpitation/sob/abd pain/n/v/ black stool/bloody stool/urinary sxs. Pt does report diarrhea that has been ongoing since starting her medication 3 years ago which she uses OTC meds to treat at home.     In the ED, pt hemodynamically stable. Lab significant for Hb of 6.0, K 5.9(rpt on VBG 5.4), Na 128, lactate 2.9.     s/p 2u pRBC and lokelma in the ED    #Acute on Chronic Anemia 2/2 to MDS  - Hgb 6.0 on admission   - hemodynamically stable   - colo 2017 normal   - follows with Dr. Wade as OP   - s/p 9 cycles of vidaza   - Luspaterecept every 3 weeks  - procrit (96990 unit) every week --> pt was scheduled for this today but was not given due to low Hgb  - requiring pRBCs transfusions almost every 3 weeks  - s/p 2 units of prBCS in ED  - keep active type and screen. Transfuse as needed  - follow up with heme onc as OP      #DM poorly controlled  - Glucose 350 on adm  - Keep FS<180  - Start lantus 12 and lispro 4 TID + SS    #Hyperkalemia   - pt known to be hyperkalemic likely multifactorial  - K 5.4 on admission, no ekg changes   - s/p lokelma 5 once   - f/u am labs  - low K Diet    #CKD III  - Cr at baseline   - avoid nephrotoxic drugs     DVT: SCD  DASH diet  IAT   77yoF with pmhx DM, CKD3,and MDS followed by Dr. Wade, requires transfusions every few weeks, sent for transfusion today; experiencing generalized weakness similar to prev episodes. Denies fever/chill/HA/dizziness/chest pain/palpitation/sob/abd pain/n/v/ black stool/bloody stool/urinary sxs. Pt does report diarrhea that has been ongoing since starting her medication 3 years ago which she uses OTC meds to treat at home.     In the ED, pt hemodynamically stable. Lab significant for Hb of 6.0, K 5.9(rpt on VBG 5.4), Na 128, lactate 2.9.     s/p 2u pRBC and lokelma in the ED    #Acute on Chronic Anemia 2/2 to MDS  - Hgb 6.0 on admission   - hemodynamically stable   - colo 2017 normal   - follows with Dr. Wade as OP   - s/p 9 cycles of vidaza   - Luspaterecept every 3 weeks  - procrit (95504 unit) every week --> pt was scheduled for this today but was not given due to low Hgb resulting in her being sent to pharmacy - reschedule for kel?  - requiring pRBCs transfusions almost every 3 weeks  - s/p 2 units of prBCS in ED  - keep active type and screen. Transfuse as needed  - follow up with heme onc as OP      #DM poorly controlled  - Glucose 350 on adm  - Keep FS<180  - Start lantus 12 and lispro 4 TID + SS    #Hyperkalemia   - pt known to be hyperkalemic likely multifactorial  - K 5.4 on admission, no ekg changes   - s/p lokelma 5 once   - f/u am labs  - low K Diet    #CKD III  - Cr at baseline   - avoid nephrotoxic drugs     DVT: SCD  DASH diet  IAT   77yoF with pmhx DM, CKD3,and MDS followed by Dr. Wade, requires transfusions every few weeks, sent for transfusion today; experiencing generalized weakness similar to prev episodes. Denies fever/chill/HA/dizziness/chest pain/palpitation/sob/abd pain/n/v/ black stool/bloody stool/urinary sxs. Pt does report diarrhea that has been ongoing since starting her medication 3 years ago which she uses OTC meds to treat at home.     In the ED, pt hemodynamically stable. Lab significant for Hb of 6.0, K 5.9(rpt on VBG 5.4), Na 128, lactate 2.9.     s/p 2u pRBC and lokelma in the ED    #Acute on Chronic Anemia 2/2 to MDS  - Hgb 6.0 on admission   - hemodynamically stable   - colo 2017 normal   - follows with Dr. Wade as OP   - s/p 9 cycles of vidaza   - Luspaterecept every 3 weeks  - procrit (27686 unit) every week -->please confirm the last dose received by pt  - requiring pRBCs transfusions almost every 3 weeks  - s/p 2 units of prBCS in ED  - keep active type and screen. Transfuse as needed  - follow up with heme onc as OP      #DM poorly controlled  - Glucose 350 on adm  - Keep FS<180  - Start lantus 12 and lispro 4 TID + SS    #Hyperkalemia   - pt known to be hyperkalemic likely multifactorial  - K 5.4 on admission, no ekg changes   - s/p lokelma 5 once   - f/u am labs  - low K Diet    #CKD III  - Cr at baseline   - avoid nephrotoxic drugs     DVT: SCD  DASH diet  IAT

## 2022-11-04 NOTE — ED ADULT TRIAGE NOTE - CHIEF COMPLAINT QUOTE
Patient a+ox4 sent over from cancer [place] co generalized weakness over last 2 days, endorses blood work done at [facility] shows below 6. Pt with hx of anemia.

## 2022-11-04 NOTE — ED ADULT NURSE NOTE - NSIMPLEMENTINTERV_GEN_ALL_ED
Implemented All Fall Risk Interventions:  [place] to call system. Call bell, personal items and telephone within reach. Instruct patient to call for assistance. Room bathroom lighting operational. Non-slip footwear when patient is off stretcher. Physically safe environment: no spills, clutter or unnecessary equipment. Stretcher in lowest position, wheels locked, appropriate side rails in place. Provide visual cue, wrist band, yellow gown, etc. Monitor gait and stability. Monitor for mental status changes and reorient to person, place, and time. Review medications for side effects contributing to fall risk. Reinforce activity limits and safety measures with patient and family.

## 2022-11-04 NOTE — ED ADULT NURSE NOTE - OBJECTIVE STATEMENT
Pt sent in for hgb of 6. Pt reports weakness over the past 2 days. Denies fever, chills, SOB, chest pain.

## 2022-11-04 NOTE — ED PROVIDER NOTE - CLINICAL SUMMARY MEDICAL DECISION MAKING FREE TEXT BOX
Patient with history as documented referred to the ED for transfusion.  Patient endorses generalized weakness.  Unremarkable exam.  Labs significant for anemia with hemoglobin of 6.  Noted to have CKD however also noted with hyponatremia, hyperkalemia as well as low bicarb.  Patient admitted for further management.

## 2022-11-04 NOTE — ED PROVIDER NOTE - PHYSICAL EXAMINATION
CONSTITUTIONAL: Well-appearing; well-nourished; in no apparent distress.   NECK: Supple; non-tender; no cervical lymphadenopathy.  CARDIOVASCULAR: Normal S1, S2; no murmurs, rubs, or gallops.   RESPIRATORY: Normal chest excursion with respiration; breath sounds clear and equal bilaterally; no wheezes, rhonchi, or rales.  GI/: Normal bowel sounds; non-distended; non-tender  MS: No evidence of trauma or deformity. Normal ROM in all four extremities; non-tender to palpation; distal pulses are normal.   SKIN: pallor, warm; dry; good turgor  NEURO/PSYCH: A & O x 4; grossly unremarkable. mood and manner are appropriate.

## 2022-11-05 LAB
A1C WITH ESTIMATED AVERAGE GLUCOSE RESULT: 7 % — HIGH (ref 4–5.6)
ALBUMIN SERPL ELPH-MCNC: 4 G/DL — SIGNIFICANT CHANGE UP (ref 3.5–5.2)
ALBUMIN SERPL ELPH-MCNC: 4.1 G/DL — SIGNIFICANT CHANGE UP (ref 3.5–5.2)
ALP SERPL-CCNC: 145 U/L — HIGH (ref 30–115)
ALP SERPL-CCNC: 147 U/L — HIGH (ref 30–115)
ALT FLD-CCNC: 26 U/L — SIGNIFICANT CHANGE UP (ref 0–41)
ALT FLD-CCNC: 29 U/L — SIGNIFICANT CHANGE UP (ref 0–41)
ANION GAP SERPL CALC-SCNC: 14 MMOL/L — SIGNIFICANT CHANGE UP (ref 7–14)
ANION GAP SERPL CALC-SCNC: 15 MMOL/L — HIGH (ref 7–14)
AST SERPL-CCNC: 17 U/L — SIGNIFICANT CHANGE UP (ref 0–41)
AST SERPL-CCNC: 23 U/L — SIGNIFICANT CHANGE UP (ref 0–41)
BASOPHILS # BLD AUTO: 0.08 K/UL — SIGNIFICANT CHANGE UP (ref 0–0.2)
BASOPHILS # BLD AUTO: 0.09 K/UL — SIGNIFICANT CHANGE UP (ref 0–0.2)
BASOPHILS NFR BLD AUTO: 1.4 % — HIGH (ref 0–1)
BASOPHILS NFR BLD AUTO: 1.8 % — HIGH (ref 0–1)
BILIRUB SERPL-MCNC: 0.9 MG/DL — SIGNIFICANT CHANGE UP (ref 0.2–1.2)
BILIRUB SERPL-MCNC: 2.5 MG/DL — HIGH (ref 0.2–1.2)
BUN SERPL-MCNC: 38 MG/DL — HIGH (ref 10–20)
BUN SERPL-MCNC: 41 MG/DL — HIGH (ref 10–20)
C DIFF BY PCR RESULT: SIGNIFICANT CHANGE UP
CALCIUM SERPL-MCNC: 8.9 MG/DL — SIGNIFICANT CHANGE UP (ref 8.4–10.4)
CALCIUM SERPL-MCNC: 8.9 MG/DL — SIGNIFICANT CHANGE UP (ref 8.4–10.5)
CHLORIDE SERPL-SCNC: 104 MMOL/L — SIGNIFICANT CHANGE UP (ref 98–110)
CHLORIDE SERPL-SCNC: 105 MMOL/L — SIGNIFICANT CHANGE UP (ref 98–110)
CO2 SERPL-SCNC: 13 MMOL/L — LOW (ref 17–32)
CO2 SERPL-SCNC: 14 MMOL/L — LOW (ref 17–32)
CREAT SERPL-MCNC: 1.4 MG/DL — SIGNIFICANT CHANGE UP (ref 0.7–1.5)
CREAT SERPL-MCNC: 1.5 MG/DL — SIGNIFICANT CHANGE UP (ref 0.7–1.5)
EGFR: 36 ML/MIN/1.73M2 — LOW
EGFR: 39 ML/MIN/1.73M2 — LOW
EOSINOPHIL # BLD AUTO: 0.04 K/UL — SIGNIFICANT CHANGE UP (ref 0–0.7)
EOSINOPHIL # BLD AUTO: 0.09 K/UL — SIGNIFICANT CHANGE UP (ref 0–0.7)
EOSINOPHIL NFR BLD AUTO: 0.8 % — SIGNIFICANT CHANGE UP (ref 0–8)
EOSINOPHIL NFR BLD AUTO: 1.6 % — SIGNIFICANT CHANGE UP (ref 0–8)
ESTIMATED AVERAGE GLUCOSE: 154 MG/DL — HIGH (ref 68–114)
GLUCOSE BLDC GLUCOMTR-MCNC: 141 MG/DL — HIGH (ref 70–99)
GLUCOSE BLDC GLUCOMTR-MCNC: 209 MG/DL — HIGH (ref 70–99)
GLUCOSE BLDC GLUCOMTR-MCNC: 266 MG/DL — HIGH (ref 70–99)
GLUCOSE BLDC GLUCOMTR-MCNC: 349 MG/DL — HIGH (ref 70–99)
GLUCOSE SERPL-MCNC: 116 MG/DL — HIGH (ref 70–99)
GLUCOSE SERPL-MCNC: 245 MG/DL — HIGH (ref 70–99)
HCT VFR BLD CALC: 29 % — LOW (ref 37–47)
HCT VFR BLD CALC: 30.6 % — LOW (ref 37–47)
HGB BLD-MCNC: 10.1 G/DL — LOW (ref 12–16)
HGB BLD-MCNC: 10.6 G/DL — LOW (ref 12–16)
IMM GRANULOCYTES NFR BLD AUTO: 1 % — HIGH (ref 0.1–0.3)
IMM GRANULOCYTES NFR BLD AUTO: 1.4 % — HIGH (ref 0.1–0.3)
LYMPHOCYTES # BLD AUTO: 1.09 K/UL — LOW (ref 1.2–3.4)
LYMPHOCYTES # BLD AUTO: 1.32 K/UL — SIGNIFICANT CHANGE UP (ref 1.2–3.4)
LYMPHOCYTES # BLD AUTO: 22.2 % — SIGNIFICANT CHANGE UP (ref 20.5–51.1)
LYMPHOCYTES # BLD AUTO: 23.4 % — SIGNIFICANT CHANGE UP (ref 20.5–51.1)
MAGNESIUM SERPL-MCNC: 1.6 MG/DL — LOW (ref 1.8–2.4)
MCHC RBC-ENTMCNC: 30.1 PG — SIGNIFICANT CHANGE UP (ref 27–31)
MCHC RBC-ENTMCNC: 30.4 PG — SIGNIFICANT CHANGE UP (ref 27–31)
MCHC RBC-ENTMCNC: 34.6 G/DL — SIGNIFICANT CHANGE UP (ref 32–37)
MCHC RBC-ENTMCNC: 34.8 G/DL — SIGNIFICANT CHANGE UP (ref 32–37)
MCV RBC AUTO: 86.9 FL — SIGNIFICANT CHANGE UP (ref 81–99)
MCV RBC AUTO: 87.3 FL — SIGNIFICANT CHANGE UP (ref 81–99)
MONOCYTES # BLD AUTO: 0.68 K/UL — HIGH (ref 0.1–0.6)
MONOCYTES # BLD AUTO: 0.8 K/UL — HIGH (ref 0.1–0.6)
MONOCYTES NFR BLD AUTO: 13.9 % — HIGH (ref 1.7–9.3)
MONOCYTES NFR BLD AUTO: 14.2 % — HIGH (ref 1.7–9.3)
NEUTROPHILS # BLD AUTO: 2.95 K/UL — SIGNIFICANT CHANGE UP (ref 1.4–6.5)
NEUTROPHILS # BLD AUTO: 3.26 K/UL — SIGNIFICANT CHANGE UP (ref 1.4–6.5)
NEUTROPHILS NFR BLD AUTO: 58 % — SIGNIFICANT CHANGE UP (ref 42.2–75.2)
NEUTROPHILS NFR BLD AUTO: 60.3 % — SIGNIFICANT CHANGE UP (ref 42.2–75.2)
NRBC # BLD: 0 /100 WBCS — SIGNIFICANT CHANGE UP (ref 0–0)
NRBC # BLD: 0 /100 WBCS — SIGNIFICANT CHANGE UP (ref 0–0)
PLATELET # BLD AUTO: 299 K/UL — SIGNIFICANT CHANGE UP (ref 130–400)
PLATELET # BLD AUTO: 301 K/UL — SIGNIFICANT CHANGE UP (ref 130–400)
POTASSIUM SERPL-MCNC: 5.5 MMOL/L — HIGH (ref 3.5–5)
POTASSIUM SERPL-MCNC: 5.8 MMOL/L — HIGH (ref 3.5–5)
POTASSIUM SERPL-SCNC: 5.5 MMOL/L — HIGH (ref 3.5–5)
POTASSIUM SERPL-SCNC: 5.8 MMOL/L — HIGH (ref 3.5–5)
PROT SERPL-MCNC: 5.8 G/DL — LOW (ref 6–8)
PROT SERPL-MCNC: 5.9 G/DL — LOW (ref 6–8)
RBC # BLD: 3.32 M/UL — LOW (ref 4.2–5.4)
RBC # BLD: 3.52 M/UL — LOW (ref 4.2–5.4)
RBC # FLD: 15.6 % — HIGH (ref 11.5–14.5)
RBC # FLD: 15.9 % — HIGH (ref 11.5–14.5)
SARS-COV-2 RNA SPEC QL NAA+PROBE: SIGNIFICANT CHANGE UP
SODIUM SERPL-SCNC: 132 MMOL/L — LOW (ref 135–146)
SODIUM SERPL-SCNC: 133 MMOL/L — LOW (ref 135–146)
WBC # BLD: 4.9 K/UL — SIGNIFICANT CHANGE UP (ref 4.8–10.8)
WBC # BLD: 5.63 K/UL — SIGNIFICANT CHANGE UP (ref 4.8–10.8)
WBC # FLD AUTO: 4.9 K/UL — SIGNIFICANT CHANGE UP (ref 4.8–10.8)
WBC # FLD AUTO: 5.63 K/UL — SIGNIFICANT CHANGE UP (ref 4.8–10.8)

## 2022-11-05 PROCEDURE — 99223 1ST HOSP IP/OBS HIGH 75: CPT

## 2022-11-05 PROCEDURE — 71045 X-RAY EXAM CHEST 1 VIEW: CPT | Mod: 26

## 2022-11-05 RX ORDER — LANOLIN ALCOHOL/MO/W.PET/CERES
3 CREAM (GRAM) TOPICAL AT BEDTIME
Refills: 0 | Status: DISCONTINUED | OUTPATIENT
Start: 2022-11-05 | End: 2022-11-06

## 2022-11-05 RX ORDER — INSULIN LISPRO 100/ML
VIAL (ML) SUBCUTANEOUS
Refills: 0 | Status: DISCONTINUED | OUTPATIENT
Start: 2022-11-05 | End: 2022-11-06

## 2022-11-05 RX ORDER — ERYTHROPOIETIN 10000 [IU]/ML
0 INJECTION, SOLUTION INTRAVENOUS; SUBCUTANEOUS
Qty: 0 | Refills: 0 | DISCHARGE

## 2022-11-05 RX ORDER — ONDANSETRON 8 MG/1
4 TABLET, FILM COATED ORAL EVERY 8 HOURS
Refills: 0 | Status: DISCONTINUED | OUTPATIENT
Start: 2022-11-05 | End: 2022-11-06

## 2022-11-05 RX ORDER — ACETAMINOPHEN 500 MG
650 TABLET ORAL EVERY 6 HOURS
Refills: 0 | Status: DISCONTINUED | OUTPATIENT
Start: 2022-11-05 | End: 2022-11-06

## 2022-11-05 RX ORDER — GLUCAGON INJECTION, SOLUTION 0.5 MG/.1ML
1 INJECTION, SOLUTION SUBCUTANEOUS ONCE
Refills: 0 | Status: DISCONTINUED | OUTPATIENT
Start: 2022-11-05 | End: 2022-11-06

## 2022-11-05 RX ORDER — INSULIN GLARGINE 100 [IU]/ML
12 INJECTION, SOLUTION SUBCUTANEOUS AT BEDTIME
Refills: 0 | Status: DISCONTINUED | OUTPATIENT
Start: 2022-11-05 | End: 2022-11-06

## 2022-11-05 RX ORDER — INSULIN LISPRO 100/ML
4 VIAL (ML) SUBCUTANEOUS
Refills: 0 | Status: DISCONTINUED | OUTPATIENT
Start: 2022-11-05 | End: 2022-11-06

## 2022-11-05 RX ORDER — SODIUM CHLORIDE 9 MG/ML
1000 INJECTION, SOLUTION INTRAVENOUS
Refills: 0 | Status: DISCONTINUED | OUTPATIENT
Start: 2022-11-05 | End: 2022-11-06

## 2022-11-05 RX ORDER — SODIUM BICARBONATE 1 MEQ/ML
1300 SYRINGE (ML) INTRAVENOUS THREE TIMES A DAY
Refills: 0 | Status: DISCONTINUED | OUTPATIENT
Start: 2022-11-05 | End: 2022-11-06

## 2022-11-05 RX ORDER — HEPARIN SODIUM 5000 [USP'U]/ML
5000 INJECTION INTRAVENOUS; SUBCUTANEOUS EVERY 12 HOURS
Refills: 0 | Status: DISCONTINUED | OUTPATIENT
Start: 2022-11-05 | End: 2022-11-06

## 2022-11-05 RX ORDER — HYDRALAZINE HCL 50 MG
10 TABLET ORAL ONCE
Refills: 0 | Status: COMPLETED | OUTPATIENT
Start: 2022-11-05 | End: 2022-11-05

## 2022-11-05 RX ORDER — DEXTROSE 50 % IN WATER 50 %
15 SYRINGE (ML) INTRAVENOUS ONCE
Refills: 0 | Status: DISCONTINUED | OUTPATIENT
Start: 2022-11-05 | End: 2022-11-06

## 2022-11-05 RX ORDER — AMLODIPINE BESYLATE 2.5 MG/1
2.5 TABLET ORAL ONCE
Refills: 0 | Status: COMPLETED | OUTPATIENT
Start: 2022-11-05 | End: 2022-11-05

## 2022-11-05 RX ORDER — SODIUM ZIRCONIUM CYCLOSILICATE 10 G/10G
10 POWDER, FOR SUSPENSION ORAL EVERY 12 HOURS
Refills: 0 | Status: DISCONTINUED | OUTPATIENT
Start: 2022-11-05 | End: 2022-11-06

## 2022-11-05 RX ORDER — DEXTROSE 50 % IN WATER 50 %
25 SYRINGE (ML) INTRAVENOUS ONCE
Refills: 0 | Status: DISCONTINUED | OUTPATIENT
Start: 2022-11-05 | End: 2022-11-06

## 2022-11-05 RX ADMIN — Medication 3: at 16:59

## 2022-11-05 RX ADMIN — Medication 1300 MILLIGRAM(S): at 21:13

## 2022-11-05 RX ADMIN — Medication 4 UNIT(S): at 12:20

## 2022-11-05 RX ADMIN — INSULIN GLARGINE 12 UNIT(S): 100 INJECTION, SOLUTION SUBCUTANEOUS at 21:13

## 2022-11-05 RX ADMIN — Medication 4: at 12:20

## 2022-11-05 RX ADMIN — AMLODIPINE BESYLATE 2.5 MILLIGRAM(S): 2.5 TABLET ORAL at 05:30

## 2022-11-05 RX ADMIN — Medication 10 MILLIGRAM(S): at 07:07

## 2022-11-05 RX ADMIN — SODIUM ZIRCONIUM CYCLOSILICATE 10 GRAM(S): 10 POWDER, FOR SUSPENSION ORAL at 20:00

## 2022-11-05 RX ADMIN — Medication 1300 MILLIGRAM(S): at 12:20

## 2022-11-05 RX ADMIN — Medication 4 UNIT(S): at 16:59

## 2022-11-05 RX ADMIN — Medication 1300 MILLIGRAM(S): at 16:58

## 2022-11-05 NOTE — PHYSICAL THERAPY INITIAL EVALUATION ADULT - GENERAL OBSERVATIONS, REHAB EVAL
Pt encountered in semi-lau position in bed, A & O x 4 in NAD, +tele, no c/o pain and agreeable with PT. Pt performed bed mobility with CGA , transfer mobility CGA and ambulated 50 ft CGA using RW with dec beatriz. Pt demonstrated limited mobility secondary weakness and fatigue easily during mobility. Pt is r/o c-diff as per Dr Zamudio(Resident). Pt left in bed as found after therapy session in NAD.

## 2022-11-05 NOTE — PHYSICAL THERAPY INITIAL EVALUATION ADULT - PERTINENT HX OF CURRENT PROBLEM, REHAB EVAL
77yoF with pmhx DM, CKD3,and MDS followed by Dr. Wade, requires transfusions every few weeks, sent for transfusion today; experiencing generalized weakness similar to prev episodes. Denies fever/chill/HA/dizziness/chest pain/palpitation/sob/abd pain/n/v/ black stool/bloody stool/urinary sxs. Pt does report diarrhea that has been ongoing since starting her medication 3 years ago which she uses OTC meds to treat at home.

## 2022-11-05 NOTE — PHYSICAL THERAPY INITIAL EVALUATION ADULT - BED MOBILITY LIMITATIONS, REHAB EVAL
decreased ability to use arms for pushing/pulling/decreased ability to use legs for bridging/pushing verbal cues

## 2022-11-06 ENCOUNTER — TRANSCRIPTION ENCOUNTER (OUTPATIENT)
Age: 77
End: 2022-11-06

## 2022-11-06 VITALS
DIASTOLIC BLOOD PRESSURE: 70 MMHG | TEMPERATURE: 98 F | SYSTOLIC BLOOD PRESSURE: 149 MMHG | RESPIRATION RATE: 18 BRPM | HEART RATE: 107 BPM | OXYGEN SATURATION: 100 %

## 2022-11-06 LAB
ALBUMIN SERPL ELPH-MCNC: 4.1 G/DL — SIGNIFICANT CHANGE UP (ref 3.5–5.2)
ALP SERPL-CCNC: 150 U/L — HIGH (ref 30–115)
ALT FLD-CCNC: 28 U/L — SIGNIFICANT CHANGE UP (ref 0–41)
ANION GAP SERPL CALC-SCNC: 11 MMOL/L — SIGNIFICANT CHANGE UP (ref 7–14)
ANION GAP SERPL CALC-SCNC: 8 MMOL/L — SIGNIFICANT CHANGE UP (ref 7–14)
APPEARANCE UR: CLEAR — SIGNIFICANT CHANGE UP
AST SERPL-CCNC: 23 U/L — SIGNIFICANT CHANGE UP (ref 0–41)
BACTERIA # UR AUTO: NEGATIVE — SIGNIFICANT CHANGE UP
BASOPHILS # BLD AUTO: 0.1 K/UL — SIGNIFICANT CHANGE UP (ref 0–0.2)
BASOPHILS NFR BLD AUTO: 2.2 % — HIGH (ref 0–1)
BILIRUB SERPL-MCNC: 0.9 MG/DL — SIGNIFICANT CHANGE UP (ref 0.2–1.2)
BILIRUB UR-MCNC: NEGATIVE — SIGNIFICANT CHANGE UP
BUN SERPL-MCNC: 39 MG/DL — HIGH (ref 10–20)
BUN SERPL-MCNC: 41 MG/DL — HIGH (ref 10–20)
CALCIUM SERPL-MCNC: 8.5 MG/DL — SIGNIFICANT CHANGE UP (ref 8.4–10.5)
CALCIUM SERPL-MCNC: 8.7 MG/DL — SIGNIFICANT CHANGE UP (ref 8.4–10.5)
CHLORIDE SERPL-SCNC: 105 MMOL/L — SIGNIFICANT CHANGE UP (ref 98–110)
CHLORIDE SERPL-SCNC: 105 MMOL/L — SIGNIFICANT CHANGE UP (ref 98–110)
CO2 SERPL-SCNC: 17 MMOL/L — SIGNIFICANT CHANGE UP (ref 17–32)
CO2 SERPL-SCNC: 18 MMOL/L — SIGNIFICANT CHANGE UP (ref 17–32)
COLOR SPEC: SIGNIFICANT CHANGE UP
CREAT SERPL-MCNC: 1.4 MG/DL — SIGNIFICANT CHANGE UP (ref 0.7–1.5)
CREAT SERPL-MCNC: 1.5 MG/DL — SIGNIFICANT CHANGE UP (ref 0.7–1.5)
DIFF PNL FLD: NEGATIVE — SIGNIFICANT CHANGE UP
EGFR: 36 ML/MIN/1.73M2 — LOW
EGFR: 39 ML/MIN/1.73M2 — LOW
EOSINOPHIL # BLD AUTO: 0.07 K/UL — SIGNIFICANT CHANGE UP (ref 0–0.7)
EOSINOPHIL NFR BLD AUTO: 1.5 % — SIGNIFICANT CHANGE UP (ref 0–8)
EPI CELLS # UR: 1 /HPF — SIGNIFICANT CHANGE UP (ref 0–5)
GLUCOSE BLDC GLUCOMTR-MCNC: 126 MG/DL — HIGH (ref 70–99)
GLUCOSE BLDC GLUCOMTR-MCNC: 246 MG/DL — HIGH (ref 70–99)
GLUCOSE BLDC GLUCOMTR-MCNC: 316 MG/DL — HIGH (ref 70–99)
GLUCOSE SERPL-MCNC: 109 MG/DL — HIGH (ref 70–99)
GLUCOSE SERPL-MCNC: 117 MG/DL — HIGH (ref 70–99)
GLUCOSE UR QL: NEGATIVE — SIGNIFICANT CHANGE UP
HCT VFR BLD CALC: 29.9 % — LOW (ref 37–47)
HGB BLD-MCNC: 10.4 G/DL — LOW (ref 12–16)
HYALINE CASTS # UR AUTO: 1 /LPF — SIGNIFICANT CHANGE UP (ref 0–7)
IMM GRANULOCYTES NFR BLD AUTO: 0.9 % — HIGH (ref 0.1–0.3)
KETONES UR-MCNC: NEGATIVE — SIGNIFICANT CHANGE UP
LEUKOCYTE ESTERASE UR-ACNC: NEGATIVE — SIGNIFICANT CHANGE UP
LYMPHOCYTES # BLD AUTO: 1.26 K/UL — SIGNIFICANT CHANGE UP (ref 1.2–3.4)
LYMPHOCYTES # BLD AUTO: 27.8 % — SIGNIFICANT CHANGE UP (ref 20.5–51.1)
MAGNESIUM SERPL-MCNC: 1.7 MG/DL — LOW (ref 1.8–2.4)
MCHC RBC-ENTMCNC: 30.2 PG — SIGNIFICANT CHANGE UP (ref 27–31)
MCHC RBC-ENTMCNC: 34.8 G/DL — SIGNIFICANT CHANGE UP (ref 32–37)
MCV RBC AUTO: 86.9 FL — SIGNIFICANT CHANGE UP (ref 81–99)
MONOCYTES # BLD AUTO: 0.63 K/UL — HIGH (ref 0.1–0.6)
MONOCYTES NFR BLD AUTO: 13.9 % — HIGH (ref 1.7–9.3)
NEUTROPHILS # BLD AUTO: 2.44 K/UL — SIGNIFICANT CHANGE UP (ref 1.4–6.5)
NEUTROPHILS NFR BLD AUTO: 53.7 % — SIGNIFICANT CHANGE UP (ref 42.2–75.2)
NITRITE UR-MCNC: NEGATIVE — SIGNIFICANT CHANGE UP
NRBC # BLD: 0 /100 WBCS — SIGNIFICANT CHANGE UP (ref 0–0)
PH UR: 6.5 — SIGNIFICANT CHANGE UP (ref 5–8)
PLATELET # BLD AUTO: 265 K/UL — SIGNIFICANT CHANGE UP (ref 130–400)
POTASSIUM SERPL-MCNC: 4.7 MMOL/L — SIGNIFICANT CHANGE UP (ref 3.5–5)
POTASSIUM SERPL-MCNC: 5.2 MMOL/L — HIGH (ref 3.5–5)
POTASSIUM SERPL-SCNC: 4.7 MMOL/L — SIGNIFICANT CHANGE UP (ref 3.5–5)
POTASSIUM SERPL-SCNC: 5.2 MMOL/L — HIGH (ref 3.5–5)
PROT SERPL-MCNC: 5.7 G/DL — LOW (ref 6–8)
PROT UR-MCNC: ABNORMAL
RBC # BLD: 3.44 M/UL — LOW (ref 4.2–5.4)
RBC # FLD: 16.9 % — HIGH (ref 11.5–14.5)
RBC CASTS # UR COMP ASSIST: 2 /HPF — SIGNIFICANT CHANGE UP (ref 0–4)
SODIUM SERPL-SCNC: 131 MMOL/L — LOW (ref 135–146)
SODIUM SERPL-SCNC: 133 MMOL/L — LOW (ref 135–146)
SP GR SPEC: 1.01 — SIGNIFICANT CHANGE UP (ref 1.01–1.03)
UROBILINOGEN FLD QL: SIGNIFICANT CHANGE UP
WBC # BLD: 4.54 K/UL — LOW (ref 4.8–10.8)
WBC # FLD AUTO: 4.54 K/UL — LOW (ref 4.8–10.8)
WBC UR QL: 0 /HPF — SIGNIFICANT CHANGE UP (ref 0–5)

## 2022-11-06 PROCEDURE — 99239 HOSP IP/OBS DSCHRG MGMT >30: CPT

## 2022-11-06 RX ORDER — SODIUM BICARBONATE 1 MEQ/ML
2 SYRINGE (ML) INTRAVENOUS
Qty: 120 | Refills: 0
Start: 2022-11-06 | End: 2022-12-05

## 2022-11-06 RX ORDER — SODIUM ZIRCONIUM CYCLOSILICATE 10 G/10G
5 POWDER, FOR SUSPENSION ORAL ONCE
Refills: 0 | Status: COMPLETED | OUTPATIENT
Start: 2022-11-06 | End: 2022-11-06

## 2022-11-06 RX ORDER — DIPHENOXYLATE HCL/ATROPINE 2.5-.025MG
2 TABLET ORAL
Qty: 180 | Refills: 0
Start: 2022-11-06 | End: 2022-12-05

## 2022-11-06 RX ADMIN — SODIUM ZIRCONIUM CYCLOSILICATE 10 GRAM(S): 10 POWDER, FOR SUSPENSION ORAL at 06:38

## 2022-11-06 RX ADMIN — SODIUM ZIRCONIUM CYCLOSILICATE 5 GRAM(S): 10 POWDER, FOR SUSPENSION ORAL at 02:18

## 2022-11-06 RX ADMIN — Medication 4 UNIT(S): at 09:56

## 2022-11-06 RX ADMIN — HEPARIN SODIUM 5000 UNIT(S): 5000 INJECTION INTRAVENOUS; SUBCUTANEOUS at 06:37

## 2022-11-06 RX ADMIN — Medication 4 UNIT(S): at 12:27

## 2022-11-06 RX ADMIN — Medication 4: at 09:55

## 2022-11-06 RX ADMIN — Medication 1300 MILLIGRAM(S): at 06:37

## 2022-11-06 RX ADMIN — Medication 2: at 12:27

## 2022-11-06 NOTE — DISCHARGE NOTE PROVIDER - NSDCCPCAREPLAN_GEN_ALL_CORE_FT
PRINCIPAL DISCHARGE DIAGNOSIS  Diagnosis: Anemia  Assessment and Plan of Treatment: You have chronic anemia (low blood counts) due to MDS. Please follow up with your hematologist for continued treatment.      SECONDARY DISCHARGE DIAGNOSES  Diagnosis: Chronic diarrhea  Assessment and Plan of Treatment: Please take Lomitl as prescribed. Follow up with a gastroenterologist.    Diagnosis: Chronic metabolic acidosis  Assessment and Plan of Treatment: This is due to diarrhea. Please take sodium bicarbonate as prescribed and follow up with your primary doctor for repeat blood test to see if you need to  continue this medication once your diarrhea is under control.

## 2022-11-06 NOTE — DISCHARGE NOTE PROVIDER - NSDCFUSCHEDAPPT_GEN_ALL_CORE_FT
Kaveh Wade  [facility]  [facility] [address]  Scheduled Appointment: 11/07/2022    [facility]  [facility] [address]  Scheduled Appointment: 11/10/2022    [facility]  [facility] [address]   Scheduled Appointment: 11/10/2022    [facility]  [facility] [address]   Scheduled Appointment: 11/11/2022    [facility]  [facility] [address]   Scheduled Appointment: 11/17/2022    [facility]  [facility] [address]   Scheduled Appointment: 11/18/2022    [facility]  [facility] [address]   Scheduled Appointment: 11/25/2022    Kaveh Wade  [facility] Physician [facility]  [facility] [address]  Scheduled Appointment: 12/01/2022    [facility]  [facility] [address]   Scheduled Appointment: 12/01/2022    [facility]  [facility] [address]   Scheduled Appointment: 12/02/2022

## 2022-11-06 NOTE — DISCHARGE NOTE PROVIDER - CARE PROVIDER_API CALL
Socrates Smith)  Gastroenterology; Internal Medicine  [address] [place] [address]  Phone: [phone]  Fax: [phone]  Follow Up Time: 2 weeks

## 2022-11-06 NOTE — DISCHARGE NOTE PROVIDER - HOSPITAL COURSE
77yoF with pmhx DM, CKD3,and MDS followed by Dr. Wade, requires transfusions every few weeks, sent for transfusion. Reports experiencing generalized weakness similar to prev episodes. Denies fever/chill/HA/dizziness/chest pain/palpitation/sob/abd pain/n/v/ black stool/bloody stool/urinary sxs. Pt does report diarrhea that has been ongoing since starting her medication 3 years ago which she uses OTC meds to treat at home.     #Chronic Anemia 2/2 MDS  - follows with Dr. Wade as OP   - Luspaterecept every 3 weeks  - procrit q weekly   - requiring pRBCs transfusions almost every 3 weeks  - s/p 2 units of prBCS in ED  - keep active type and screen. Transfuse as needed  - supportive care     #Non anion gap metabolic acidosis 2/2 Diarrhea   #Hyperkalemia   - C diff negative   - Lomotil PRN   - c/w sodium bicarbonate 1300mg TID   - low K Diet    #DM poorly controlled  - c/w insulin     #CKD III  - Cr at baseline   - avoid nephrotoxic drugs

## 2022-11-06 NOTE — DISCHARGE NOTE PROVIDER - NSDCMRMEDTOKEN_GEN_ALL_CORE_FT
diphenoxylate-atropine 2.5 mg-0.025 mg oral tablet: 2 tab(s) orally every 8 hours MDD:6 tablets  glimepiride 4 mg oral tablet: 1 tab(s) orally 2 times a day  repaglinide 0.5 mg oral tablet: 1 tab(s) orally 3 times a day (before meals)  sodium bicarbonate 650 mg oral tablet: 2 tab(s) orally 2 times a day

## 2022-11-06 NOTE — DISCHARGE NOTE NURSING/CASE MANAGEMENT/SOCIAL WORK - PATIENT PORTAL LINK FT
You can access the FollowMyHealth Patient Portal offered by [facility] by registering at the following website: [email or web address]. By joining [facility]’s FollowMyHealth portal, you will also be able to view your health information using other applications (apps) compatible with our system.

## 2022-11-06 NOTE — DISCHARGE NOTE NURSING/CASE MANAGEMENT/SOCIAL WORK - NSDCPEFALRISK_GEN_ALL_CORE
For information on Fall & Injury Prevention, visit: [email or web address] OR  [email or web address] OR  https://www.cdc.gov/steadi/patient.html

## 2022-11-07 ENCOUNTER — APPOINTMENT (OUTPATIENT)
Dept: HEMATOLOGY ONCOLOGY | Facility: CLINIC | Age: 77
End: 2022-11-07

## 2022-11-07 LAB
CULTURE RESULTS: SIGNIFICANT CHANGE UP
SPECIMEN SOURCE: SIGNIFICANT CHANGE UP

## 2022-11-10 ENCOUNTER — APPOINTMENT (OUTPATIENT)
Dept: INFUSION THERAPY | Facility: CLINIC | Age: 77
End: 2022-11-10

## 2022-11-10 ENCOUNTER — APPOINTMENT (OUTPATIENT)
Dept: HEMATOLOGY ONCOLOGY | Facility: CLINIC | Age: 77
End: 2022-11-10

## 2022-11-10 VITALS
RESPIRATION RATE: 63 BRPM | TEMPERATURE: 97.9 F | OXYGEN SATURATION: 96 % | DIASTOLIC BLOOD PRESSURE: 71 MMHG | WEIGHT: 132 LBS | BODY MASS INDEX: 24.29 KG/M2 | SYSTOLIC BLOOD PRESSURE: 162 MMHG | HEIGHT: 62 IN | HEART RATE: 86 BPM

## 2022-11-10 PROCEDURE — 99214 OFFICE O/P EST MOD 30 MIN: CPT

## 2022-11-10 RX ORDER — ERYTHROPOIETIN 10000 [IU]/ML
80000 INJECTION, SOLUTION INTRAVENOUS; SUBCUTANEOUS ONCE
Refills: 0 | Status: COMPLETED | OUTPATIENT
Start: 2022-11-10 | End: 2022-11-10

## 2022-11-10 RX ORDER — LUSPATERCEPT 75 MG/1
110 INJECTION, POWDER, LYOPHILIZED, FOR SOLUTION SUBCUTANEOUS ONCE
Refills: 0 | Status: COMPLETED | OUTPATIENT
Start: 2022-11-10 | End: 2022-11-10

## 2022-11-10 RX ADMIN — ERYTHROPOIETIN 80000 UNIT(S): 10000 INJECTION, SOLUTION INTRAVENOUS; SUBCUTANEOUS at 14:08

## 2022-11-10 RX ADMIN — LUSPATERCEPT 110 MILLIGRAM(S): 75 INJECTION, POWDER, LYOPHILIZED, FOR SOLUTION SUBCUTANEOUS at 14:45

## 2022-11-10 NOTE — HISTORY OF PRESENT ILLNESS
[de-identified] : She missed on appointment for procrit last week.\par She starts her next cycle on 6/25/18\par C/o back pain radiating down her left which occurred when she bent to  something.\par No change in diarrhea.\par \par 7/31/18:\par Doing well. On Revlimid for more than 2yrs, 5 mg 2 weeks on 1 week off. She will be starting week on tonight. \par C/o diarrhea. On Imodium 2 pills AM and 2 pills PM. Doesn't help much with diarrhea. \par C/o nausea, abdominal pain. \par \par \par Colonoscopy in 2016 was normal. \par Did not have blood transfusion for over 2 years. \par On procrit [id number] weekly. Missed last week on 7/24/18 as she was out of town. She has been non compliant with weekly Procrit.\par Mammogram in 2017 was normal. \par \par 9/11/18\par pt is here for follow up.\par She feels the lomotil helps with the diarrhea.\par She was away for a few weeks and missed her procrit injections.\par No fever, abdominal  discomfort has been less since since use of lomotil.\par She started a new cycle 2 days ago.\par \par 10/2/18:\par She will start Revlimid tonight (week on). 2 weeks on, 1 week off. \par On ASA 81mg. \par Denies fever, nausea, vomiting, chest pain, SOB, abdominal pain, and bladder problems.\par C/o diarrhea. \par S/p 2 units PRBC transfusion on 9/25/18. \par On Procrit 39863 units weekly. Not compliant every week. \par C/o intermittent dizziness. \par \par 10/31/18\par Pt is here for follow up.\par C/O significant fatigue.\par Continues to have diarrhea, takes imodium and lomotil as needed.\par C/o dry cough, no fever.\par Cough started a month ago. It is worse in the mornings however over last week it has been constant all day.\par No chest pain.\par She was found to have low B12 levels and was started on B12 injections.\par \par 11/27/18:\par Doing well. Hospitalized for 3 days for cellulitis/abcess of the back s/p drainage and on Abx currently. Developed 3 days after Mg infusion as per pt. \par Denies nausea, vomiting, chest pain, SOB, abdominal pain, bowel and bladder problems.\par This is the week on Revlimid (week 1).\par S/p 1 unit PRBC transfusion in the hospital. \par On Procrit weekly \par \par 12/27/18:\par Doing well. No major complaints.\par Denies fever, nausea, vomiting, chest pain, SOB, abdominal pain, and bladder problems.\par On Revlimid 5 mg 2 weeks on 1 week off. This is the week off. She will start the week on sunday (12/30/18).\par Due for Procrit 00888 units today. \par Still has diarrhea. 4-5bm/day.\par On monthly B12 injections. \par \par 1/30/19:\par Patient here for follow up for MDS.  She is taking Revlimid 5 mg, 2 weeks on, 1 week off.  She will complete this current cycle on Saturday.  She has no new complaints today.  Patient denies cough, shortness of breath, denies fever, denies bone pain.\par \par 03/04/2019\par Patient is here for a follow up visit. She complaints of severe pain in her left shoulder and has had abscess drained 2 weeks ago. However the pain is worse and she has purulent discharge on examination. She also has Nasal sores that are painful. Culture from 11/2018 showed MRSA.  Denies Fever. \par She also complaints of swelling in her right leg. Not tender and not erythematous and negative Gong;s sign. \par Her diarrhea with Revlimid is ongoing and Imodium and Lomotil helps but not everyday. \par She is on 60,000 units of procrit weekly and Revlimid 5 mg 2 weeks on 1 week off. \par \par 4/23/19\par Pt is here for follow up.\par She feels well.\par The nasal sores have improved with bactroban\par The abscess on left shoulder has resolved.\par However she has a new one on the middle of her back.\par No fever.\par Pt has been on procrit 78554 units weekly, however she missed a dose in between.\par \par 5/8/19\par pt is here for follow up.\par no new complaints.\par feels well.\par Her skin abscess has resolved.\par she has been unable to go to ID, as she could not get an appt.\par No bleeding.\par She is compliant with revlimid.\par \par 6/6/19\par Pt is here for follow up.\par no new complaints \par Feels well.\par Is on week 2 of her Revlimid cycle. \par No transfusions since last visit\par \par 7/17/19\par Pt is here for follow up.\par C/O fatigue.\par Was away for a few days two weeks ago, but missed treatment with procrit for 2 weeks.\par Is complaint with Revlimid 2 weeks on 1 week off.\par Diarrhea is a little improved.\par \par 8/14/19\par Patient here for follow up visit, feeling well.  Although she is complaining of some pain at the left scapula area recently.  Patient denies cough, shortness of breath, denies fever, denies other bone pain.  She is off Revlimid  this week, due to restart on Monday.  She is scheduled for Procrit and Vitamin B12 injection today.  She plans to leave the country tomorrow to visit her mother who is ill.  She will return in about 10 days.\par \par 9/11/19\par Pt is here for follow up.\par She was away for 3 weeks, out of which she took procrit for 2 weeks in [place].\par She missed last week's dose.\par She had CBCs in [place] which showed Hgb of 8.9\par She feels well.\par She still getting diarrhea.\par She has been using lomotil with very minimal relief.\par She started a new cycle of Revlimid 4 days ago.\par \par 10/3/19\par Patient here for follow up visit, feeling fairly well.   Patient denies cough, shortness of breath, denies fever, denies other bone pain.  She remains on Revlimid.  She is scheduled for Procrit and Vitamin B12 injection today.\par \par 10/24/19\par Pt is here for follow up.\par Feels well.\par No SOB, fatigue.\par Compliant with procrit and Revl;imid.\par Remains on ASa.\par Diarrhea is unchanged.\par Pt takes imodium as needed.\par \par 11/14/19\par Pt is here for follow up.\par No new complaints.\par Starts a new cycle of Revlimid today.\par Diarrhea is same as before, no rectal bleeding.\par No fever.\par \par 12/5/19\par Pt is here for follow up.\par No new complaints.\par Denies feeling weaker than usual.\par No fever, SOB.\par No change in frequency of diarrhea.\par No pain.\par Will start next cycle of Revlimid in 3 days.\par \par \par !/16/20\par Pt was recently DCed from [facility] 3 days ago after being treated for pneumonia and MARCO.\par She is on po Levaquin.\par She restarted Revlmid 3 days ago.\par She is feeling better now. No fever.\par No SOB, Chest pain and back pain has resolved.\par Pt has a cough, no expectoration.\par She also recd a unit of PRBCs last week in the hospital\par \par \par 1/30/20\par Patient here for follow up visit, feeling well.  She has no new complaints. Cough is almost gone completely.  Patient denies shortness of breath, denies fever, denies bone pain.  Her appetite is ok, weight is stable.  She is due to restart Revlimid on Monday.\par \par 02/20/20\par Pt is here for follow up, feeling well.\par Offers no new complaints. Denies SOB, fever, chills, weight loss, CP, cough. \par Currently off week of Revlimid 5 mg. Restarts on Monday.\par Has procrit 80,000 units today. Next b 12 injection due in 2 weeks \par Did not get chest Xray\par CBC reviewed WBC 3.1, h/h 8.3/25%, plt 386, neutrophils 1.29\par \par 3/12/20\par Pt is here for follow up.\par She took Revlimid for 2 weeks and then has been off for one week although she was advised to take it daily without break.\par No SOB, Cough, fever.\par Has mild fatigue.\par \par 04/02/2020\par SIMONA KUHN a 75 year F is here today for follow up visit of MDS.\par Denies fever, chills, cough,night sweats, weight loss. \par Denies bleeding or bruising.\par Pt receives procrit 80,000 units weekly and B12 IM monthly. \par Continued Revlimid 5 mg daily x 3 weeks, has 1 dose left tonight. \par CBC reviewed: WBC 3.2, H/H 8.1/25.2, neutrophils 1.6, PLT 72\par \par 4/20/2020: The patient is here for follow up . She has no complaints of fever, chills, dizziness , chest pain and shortness of breath . She is still with diarrhea , up to 10 watery BMs per day, responds poorly to imodium and lomotil. She is on Revlimid 5 mg daily , took last dose yesterday night. Her last Procrit was on April 13. \par \par 5/26/20\par Pt is here for follow up.\par She is feeling well. Denies SOB, chest pain, fever.\par Continues to have diarrhea although she is off of Revlmid.\par Thinks it may due to diabetic meds.\par Wants to discuss BM bx results\par \par 6/22/20\par Pt is here for follow up.\par Feels well. Denies any fever, N, V, D\par Continues to have diarrhea, she will talk to her PCP about changing metformin for DM.\par Has been needing PRBCs 1 unit each month\par \par 7/20/20\par Pt is here for follow up.\par No new complaints. Has been feeling well.\par No SOB, CP. \par No N, V, D.\par She has recd 2 units of PRBCs since May 20.\par \par 8/17/20\par Pt is here for a follow up visit.\par She is feeling well.\par No new symptoms. No N, V, D.\par No bruising or bleeding. She continues to need PRBCs every 2-3 weeks.\par \par 8/31/20\par Pt is here for follow up. She is feeling well. No N, V, D.\par She is tolerating the hydrea well. No SOB, CP\par No bruising ior bleeding\par \par 9/8/20\par Pt is here for follow up.\par She had been contacted by the [facility] last week to advise her to stop the hydrea. Pt did not check her messages and continued with Hydrea.\par No fever, N, V, bleeding.\par Saw Dr Ferrell last week.\par \par 9/14/20\par Pt is here for folow up.\par Besides her usual complaint of diarrhea she is feeling well.\par Recd 1 unit PRBC last week.\par Has stopped Hydrea.\par No fever, mouth sores.\par \par 9/29/20\par Pt is here for folow up.\par No new complaints.\par Is seeing GI for diarrhea net week.\par No fever, night sweats.\par REcd 3 units of PRBC this month\par \par 10/13/20\par Pt is here for follow up.\par No new complaints.\par Has not needed a transfusion since 3 weeks ago.\par Continues to have diarrhea, waiting to be seen by GI\par \par 10/26/20\par Pt is here for follow up.\par C/O feeling fatigued.\par Has CLARK.\par No nausea or vomiting.\par No fever.\par Diarrhea has improved a little. She is no longer on Metformin\par \par 11/9/20\par Pt is here for follow up.\par Feels well. Denies SOB, CP, fatigue\par \par 12/7/20- Patient is for follow up and is due for cycle 9 of Vidaza.  She still has loose BM sometimes 10 times a day and was seeing Dr. Corey from GI- did not follow up recently and is unable to get an appt with him. She has lost about 10 lbs since September. No N/V. No fever or chills. No CP/SOB. She has been getting PRBC transfusion every 3 weeks now\par \par 01/04/20 Pt presented today for f/u visit . She is s/p 8 cycles of vidaza and was started on Luspatercept in Dec , 2020 . So far she received 1 injection , she is due for 2nd injection today . Adverse effect profile was discussed with her in detail , she reports having some dizziness and weakness after first injection . She received blood transfusion last wk . Blood work from today reviewed as well and counts are adequate . She denies any fevers,  chills,  or any new symptoms . \par \par 1/25/21\par Pt is here for follow up.\par C/O diarrhea, otherwise feeling better.\par Has not needed a transfusion since 12/29\par \par 2/16/21\par Pt is here for follow up.\par Needed transfusion on 2/8/21.\par Continues to C/o fatigue. Diarrhea remains the same, has not made GI appt as yet.\par \par 3/8/21\par Pt is here for follow up.\par Feels better today. Recd 1 unit PRBCs last week.\par Diarrhea is same as before. has an appt with GI next week.\par No fever\par \par 3/30/21\par Pt is here for follow up.\par Feels better. Last transfusion was on 3/2/21\par Saw GI and was started on cholestyramine and metformin was DCED with resolution of diarrhea.\par She denies any SOB,\par Fatigue is better\par \par 4/20/2021\par Pt is here to f/u for MDS\par She is s/p Luspatercept cycle #6\par She is compliant with Aspirin\par She feels better today, appetite is good\par She denies shortness of breath, fatigue, or weakness \par She c/o of diarrhea but it has improved since she was started on Cholestyramine. Stool is soft and less in frequency\par She received COVID vaccine x 2 doses\par \par 5/11/21\par pt is here for follow up.\par Feels the same with fatigue, diarrhea.\par No SOB\par \par 6/21/21\par Pt is here for follow up.\par Feels well. No SOB, CP, N< V.\par Diarrhea is better controlled now.\par Last PRBC transfusion was 2 weeks ago.\par \par 7/19/21\par Pt is here for follow up.\par Feels a little tired, last transfusion was 6/1/21.\par No bleeding, fever, SOB\par \par 8/10/2021\par Patient is here to follow up for her MDS.\par She is on Luspatercept, tolerating well.\par She feels well today, the appetite is good.\par She denies shortness of breath, fatigue, fever, night sweats, abdominal pain, arthralgias/myalgias.\par \par 8/31/21\par Pt is here for follow up.\par C/O feeling very fatigued.\par No bleeding.\par No fever.\par \par 9/21/2021\par Patient is here to follow up for MDS.\par She is on Luspatercept and Retacrit, tolerating well.\par She gets blood transfusion as needed for Hgb <7 gm/dL\par She is c/o of fatigue, no shortness of breath, dizziness, chills or lightheadedness.\par She denies melena or hematochezia.\par Her appetite is good, no nausea/vomiting.\par \par 10/28/2021\par Patient is here to follow up for MDS.\par She is on Luspatercept and Retacrit, tolerating well.\par She gets blood transfusion to keep Hgb > 7 gm/dL.\par She is c/o of chronic fatigue, no shortness of breath, dizziness, chills or lightheadedness.\par She denies melena or hematochezia.\par Her appetite is good, no nausea/vomiting.\par She c/o of severe diarrhea, 10-12x/day watery stool while on Imodium in the last 2 weeks.\par Last colonoscopy was in 2016, benign as per patient.\par \par 11/18/21\par Pt is here for follow up. C/O fatigue. No SOB, CP\par \par 12/9/21\par Pt is here for follow up.\par Feels better today. Recd 2 units last week in ER.\par Planning to go to [place] for over a week and does not want to miss her procrit dose.\par Diarrhea is stable,\par \par 12/23/21\par Pt is here for follow up.\par Feels better. No SOB, CP. Going to [place] tomorrow. Has not been able to get Procrit injection from the pharmacy yet d/t insurance issues\par \par 2/3/22\par Pt is here for follow up. Feeling same as usual. No SOB, CP. Last transfusion was 2 weeks ago in ER>\par No bleeding reported\par \par 3/3/22\par Pt is here for follow up.\par C/O fatigue. Had black stools X2\par \par 3/30/22\par Pt is feeling well.\par Here for treatment and BM biopsy\par \par 4/21/22\par Pt is here fir follow up.\par Was in ER last week. Was give 2 units of PRBCs.\par Pt feels less tired today.\par Wants to discuss BM rslts\par \par 5/12/22\par Pt is here today for follow up visit.\par Pt c/o feeling tired.  Hgb=6.9.  One unit transfusion scheduled.\par \par 6/23/22\par Pt is here for follow up. Feels well today. No SOB, chest pain.\par Has not made appt with [facility]\par \par 7/14/22\par Pt is here for follow up for lowrisk myelodysplastic syndrome.\par She came in late today because she was not feeling well this morning- dizziness & weakness.\par She denies SOB, CP.\par \par 9/22/22\par Pt is here for follow up.\par C/O fatigue. was in the ER 3 weeks ago and recd 2 U PRBCS\par \par 11/10/22\par Patient is here to follow up for MDS/MPN.\par S/P 2 units pRBC last weekend, feeling much better now.\par She denies shortness of breath, chest pain or headache.\par  [de-identified] : This is a 75 year old  female with history of MDS. She's was previously treated with Revlimid 5 mg, 2 weeks on, 1 week off.  \par She is also on Procrit.\par  She underwent colonoscopy (2/2017)  Results noted no colitis, only hyperplastic polyp noted. \par  \par

## 2022-11-10 NOTE — ASSESSMENT
[FreeTextEntry1] : Patient is a 77 year old female with # Lowrisk myelodysplastic syndrome, previously treated with Revlimid and Procrit and Vidaza . 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 \par Patient is s/p  9 cycles of Vidaza and she was requiring pRBC transfusion every 3 weeks \par Given the persistent transfusion dependence she was switched to Luspatercept.\par \par DETECTED GENOMIC ALTERATIONS:\par Tier III: Variants of Unknown Clinical Significance\par SF3B1 [id number]\par Hyperkalemia likely due to increased platelet; pseudohyperkalemia.\par \par PLAN:\par -- Continue Luspatercept 1.75 mg/kg every 3 weeks, \par --Continue Retacrit (Procrit)  80,000 units every week.\par Side effects of Procrit discussed including but not limited to: hypertension, headache, pruritus, nausea, vomiting, injection site pain, arthralgia, cough, fever.\par -- Monitor CBC weekly and administer 1 PRBC unit  as needed when Hgb < 7 gm/dL.  Pt has e/o iron overload so transfusions need to be kept at a minimum if possible.\par \par # Vitamin  B 12 deficiency\par -- Continue Vitamin B 12 injection every month\par \par # Thrombocytosis\par -- S/P Hydrea\par -- Will continue to monitor.\par \par Results of  BM biopsy d/w pt. No e/o increased blasts. Pt is needing PRBCs almost every 3 weeks. Advised to follow up with Dr Ferrell to consider any other option or clinical trial. Called Dr Ferrell's office and discussed case with her. No clinical trial available, Rec PRBCS.\par \par RTC in 3 weeks

## 2022-11-10 NOTE — REVIEW OF SYSTEMS
[Recent Change In Weight] : ~T recent weight change [Negative] : Gastrointestinal [Fatigue] : no fatigue [Shortness Of Breath] : no shortness of breath [Diarrhea] : no diarrhea [FreeTextEntry2] : weight loss 10 lbs in the last 4 months

## 2022-11-11 ENCOUNTER — APPOINTMENT (OUTPATIENT)
Dept: INFUSION THERAPY | Facility: CLINIC | Age: 77
End: 2022-11-11

## 2022-11-14 LAB
ABO + RH PNL BLD: NORMAL
BASOPHILS # BLD AUTO: 0.06 K/UL
BASOPHILS NFR BLD AUTO: 1.4 %
BLD GP AB SCN SERPL QL: NORMAL
EOSINOPHIL # BLD AUTO: 0.05 K/UL
EOSINOPHIL NFR BLD AUTO: 1.1 %
HCT VFR BLD CALC: 28.1 %
HGB BLD-MCNC: 9.6 G/DL
IMM GRANULOCYTES NFR BLD AUTO: 0.9 %
LYMPHOCYTES # BLD AUTO: 0.61 K/UL
LYMPHOCYTES NFR BLD AUTO: 13.9 %
MAN DIFF?: NORMAL
MCHC RBC-ENTMCNC: 30.9 PG
MCHC RBC-ENTMCNC: 34.2 G/DL
MCV RBC AUTO: 90.4 FL
MONOCYTES # BLD AUTO: 0.52 K/UL
MONOCYTES NFR BLD AUTO: 11.8 %
NEUTROPHILS # BLD AUTO: 3.12 K/UL
NEUTROPHILS NFR BLD AUTO: 70.9 %
PLATELET # BLD AUTO: 283 K/UL
RBC # BLD: 3.11 M/UL
RBC # FLD: 17.3 %
WBC # FLD AUTO: 4.4 K/UL

## 2022-11-16 DIAGNOSIS — E11.22 TYPE 2 DIABETES MELLITUS WITH DIABETIC CHRONIC KIDNEY DISEASE: ICD-10-CM

## 2022-11-16 DIAGNOSIS — D63.0 ANEMIA IN NEOPLASTIC DISEASE: ICD-10-CM

## 2022-11-16 DIAGNOSIS — E87.1 HYPO-OSMOLALITY AND HYPONATREMIA: ICD-10-CM

## 2022-11-16 DIAGNOSIS — K52.1 TOXIC GASTROENTERITIS AND COLITIS: ICD-10-CM

## 2022-11-16 DIAGNOSIS — D46.9 MYELODYSPLASTIC SYNDROME, UNSPECIFIED: ICD-10-CM

## 2022-11-16 DIAGNOSIS — T50.905A ADVERSE EFFECT OF UNSPECIFIED DRUGS, MEDICAMENTS AND BIOLOGICAL SUBSTANCES, INITIAL ENCOUNTER: ICD-10-CM

## 2022-11-16 DIAGNOSIS — Z79.84 LONG TERM (CURRENT) USE OF ORAL HYPOGLYCEMIC DRUGS: ICD-10-CM

## 2022-11-16 DIAGNOSIS — N18.30 CHRONIC KIDNEY DISEASE, STAGE 3 UNSPECIFIED: ICD-10-CM

## 2022-11-16 DIAGNOSIS — E87.5 HYPERKALEMIA: ICD-10-CM

## 2022-11-16 DIAGNOSIS — E87.20 ACIDOSIS, UNSPECIFIED: ICD-10-CM

## 2022-11-16 DIAGNOSIS — E11.65 TYPE 2 DIABETES MELLITUS WITH HYPERGLYCEMIA: ICD-10-CM

## 2022-11-16 DIAGNOSIS — R53.1 WEAKNESS: ICD-10-CM

## 2022-11-17 ENCOUNTER — APPOINTMENT (OUTPATIENT)
Dept: INFUSION THERAPY | Facility: CLINIC | Age: 77
End: 2022-11-17

## 2022-11-17 RX ORDER — ERYTHROPOIETIN 10000 [IU]/ML
80000 INJECTION, SOLUTION INTRAVENOUS; SUBCUTANEOUS ONCE
Refills: 0 | Status: COMPLETED | OUTPATIENT
Start: 2022-11-17 | End: 2022-11-17

## 2022-11-17 RX ADMIN — ERYTHROPOIETIN 80000 UNIT(S): 10000 INJECTION, SOLUTION INTRAVENOUS; SUBCUTANEOUS at 13:42

## 2022-11-18 ENCOUNTER — APPOINTMENT (OUTPATIENT)
Dept: INFUSION THERAPY | Facility: CLINIC | Age: 77
End: 2022-11-18

## 2022-11-18 LAB
BASOPHILS # BLD AUTO: 0.11 K/UL
BASOPHILS NFR BLD AUTO: 1.9 %
EOSINOPHIL # BLD AUTO: 0.08 K/UL
EOSINOPHIL NFR BLD AUTO: 1.4 %
HCT VFR BLD CALC: 25.8 %
HGB BLD-MCNC: 8.4 G/DL
IMM GRANULOCYTES NFR BLD AUTO: 1.4 %
IRON SERPL-MCNC: 152 UG/DL
LYMPHOCYTES # BLD AUTO: 0.81 K/UL
LYMPHOCYTES NFR BLD AUTO: 14.1 %
MAN DIFF?: NORMAL
MCHC RBC-ENTMCNC: 30 PG
MCHC RBC-ENTMCNC: 32.6 G/DL
MCV RBC AUTO: 92.1 FL
MONOCYTES # BLD AUTO: 0.65 K/UL
MONOCYTES NFR BLD AUTO: 11.3 %
NEUTROPHILS # BLD AUTO: 4.01 K/UL
NEUTROPHILS NFR BLD AUTO: 69.9 %
PLATELET # BLD AUTO: 453 K/UL
RBC # BLD: 2.8 M/UL
RBC # FLD: 17 %
TIBC SERPL-MCNC: 172.9 UG/DL
UIBC SERPL-MCNC: 20.9 UG/DL
WBC # FLD AUTO: 5.74 K/UL

## 2022-11-22 ENCOUNTER — APPOINTMENT (OUTPATIENT)
Dept: INFUSION THERAPY | Facility: CLINIC | Age: 77
End: 2022-11-22

## 2022-11-22 LAB
BASOPHILS # BLD AUTO: 0.08 K/UL
BASOPHILS NFR BLD AUTO: 1.3 %
EOSINOPHIL # BLD AUTO: 0.06 K/UL
EOSINOPHIL NFR BLD AUTO: 1 %
HCT VFR BLD CALC: 23.6 %
HGB BLD-MCNC: 8.1 G/DL
IMM GRANULOCYTES NFR BLD AUTO: 1.8 %
LYMPHOCYTES # BLD AUTO: 1.1 K/UL
LYMPHOCYTES NFR BLD AUTO: 17.9 %
MAN DIFF?: NORMAL
MCHC RBC-ENTMCNC: 30.9 PG
MCHC RBC-ENTMCNC: 34.3 G/DL
MCV RBC AUTO: 90.1 FL
MONOCYTES # BLD AUTO: 0.79 K/UL
MONOCYTES NFR BLD AUTO: 12.9 %
NEUTROPHILS # BLD AUTO: 3.99 K/UL
NEUTROPHILS NFR BLD AUTO: 65.1 %
PLATELET # BLD AUTO: 305 K/UL
RBC # BLD: 2.62 M/UL
RBC # FLD: 17.4 %
WBC # FLD AUTO: 6.13 K/UL

## 2022-11-22 RX ORDER — ERYTHROPOIETIN 10000 [IU]/ML
80000 INJECTION, SOLUTION INTRAVENOUS; SUBCUTANEOUS ONCE
Refills: 0 | Status: COMPLETED | OUTPATIENT
Start: 2022-11-22 | End: 2022-11-22

## 2022-11-22 RX ADMIN — ERYTHROPOIETIN 80000 UNIT(S): 10000 INJECTION, SOLUTION INTRAVENOUS; SUBCUTANEOUS at 14:30

## 2022-11-25 ENCOUNTER — APPOINTMENT (OUTPATIENT)
Dept: INFUSION THERAPY | Facility: CLINIC | Age: 77
End: 2022-11-25

## 2022-12-01 ENCOUNTER — APPOINTMENT (OUTPATIENT)
Dept: INFUSION THERAPY | Facility: CLINIC | Age: 77
End: 2022-12-01

## 2022-12-01 ENCOUNTER — APPOINTMENT (OUTPATIENT)
Dept: HEMATOLOGY ONCOLOGY | Facility: CLINIC | Age: 77
End: 2022-12-01

## 2022-12-02 ENCOUNTER — APPOINTMENT (OUTPATIENT)
Dept: INFUSION THERAPY | Facility: CLINIC | Age: 77
End: 2022-12-02

## 2022-12-08 ENCOUNTER — APPOINTMENT (OUTPATIENT)
Dept: INFUSION THERAPY | Facility: CLINIC | Age: 77
End: 2022-12-08

## 2022-12-08 RX ORDER — ERYTHROPOIETIN 10000 [IU]/ML
80000 INJECTION, SOLUTION INTRAVENOUS; SUBCUTANEOUS ONCE
Refills: 0 | Status: COMPLETED | OUTPATIENT
Start: 2022-12-08 | End: 2022-12-08

## 2022-12-08 RX ADMIN — ERYTHROPOIETIN 80000 UNIT(S): 10000 INJECTION, SOLUTION INTRAVENOUS; SUBCUTANEOUS at 14:41

## 2022-12-09 ENCOUNTER — APPOINTMENT (OUTPATIENT)
Dept: INFUSION THERAPY | Facility: CLINIC | Age: 77
End: 2022-12-09

## 2022-12-09 RX ORDER — PREGABALIN 225 MG/1
1000 CAPSULE ORAL ONCE
Refills: 0 | Status: COMPLETED | OUTPATIENT
Start: 2022-12-09 | End: 2022-12-09

## 2022-12-09 RX ORDER — LUSPATERCEPT 75 MG/1
110 INJECTION, POWDER, LYOPHILIZED, FOR SOLUTION SUBCUTANEOUS ONCE
Refills: 0 | Status: COMPLETED | OUTPATIENT
Start: 2022-12-09 | End: 2022-12-09

## 2022-12-09 RX ADMIN — LUSPATERCEPT 110 MILLIGRAM(S): 75 INJECTION, POWDER, LYOPHILIZED, FOR SOLUTION SUBCUTANEOUS at 11:06

## 2022-12-09 RX ADMIN — PREGABALIN 1000 MICROGRAM(S): 225 CAPSULE ORAL at 11:06

## 2022-12-12 LAB
ABO + RH PNL BLD: NORMAL
BASOPHILS # BLD AUTO: 0.06 K/UL
BASOPHILS NFR BLD AUTO: 0.9 %
BLD GP AB SCN SERPL QL: NORMAL
EOSINOPHIL # BLD AUTO: 0.02 K/UL
EOSINOPHIL NFR BLD AUTO: 0.3 %
HCT VFR BLD CALC: 17.4 %
HGB BLD-MCNC: 5.7 G/DL
IMM GRANULOCYTES NFR BLD AUTO: 2.5 %
LYMPHOCYTES # BLD AUTO: 0.61 K/UL
LYMPHOCYTES NFR BLD AUTO: 9.5 %
MAN DIFF?: NORMAL
MCHC RBC-ENTMCNC: 30.6 PG
MCHC RBC-ENTMCNC: 32.8 G/DL
MCV RBC AUTO: 93.5 FL
MONOCYTES # BLD AUTO: 0.76 K/UL
MONOCYTES NFR BLD AUTO: 11.9 %
NEUTROPHILS # BLD AUTO: 4.8 K/UL
NEUTROPHILS NFR BLD AUTO: 74.9 %
PLATELET # BLD AUTO: 371 K/UL
RBC # BLD: 1.86 M/UL
RBC # FLD: 18.7 %
WBC # FLD AUTO: 6.41 K/UL

## 2022-12-12 NOTE — ED ADULT TRIAGE NOTE - CCCP TRG CHIEF CMPLNT
[facility] (:  1987) is a 30 y.o. female,Established patient, here for evaluation of the following chief complaint(s):    Pain (Fibromyalgia )         ASSESSMENT/PLAN:    1. Fibromyalgia  2. Current mild episode of major depressive disorder without prior episode ([address]  3. Generalized anxiety disorder  4. Chronic bilateral low back pain, unspecified whether sciatica present  5. Lumbar degenerative disc disease  6. Lumbar spondylosis  7. Lumbar herniated disc  8. Celiac disease  9. Lactose intolerance      Continue cymbalta 30mg once daily   Continue yoga  Daily exercise recommended   Continue counseling  Follow up with pan management as scheduled  Follow up with neurosurgeon as needed   Gluten free diet   Lactose free diet  Trial of FODMAP diet   Follow up with GI as scheduled   Call with concerns       Return in about 4 months (around 2023) for routine follow up. Subjective     HPI    Patient presents today for routine follow-up of fibromyalgia. She was seen several months ago and it was suspected that she did have fibromyalgia based on her symptoms of fatigue and chronic pain as well as findings on her physical exam.  She was started on Cymbalta 20 mg once daily. She stated that her pain symptoms did improve however she did have mild worsening of her anxiety and so her Cymbalta was increased to 30 mg. She reports that the 30 mg of Cymbalta has been controlling her fibromyalgia well. She does get some occasional flareups which she thinks might be related to the weather. She is happy with how well her fibromyalgia is controlled. She does have a history of major depression and anxiety that does seem to be very well controlled on Cymbalta 30 mg once daily. She also has a counselor which she continues to to consult with every 4 weeks and she reports this is helpful.   She does have a history of chronic low back pain secondary to lumbar degenerative disc disease, lumbosacral spondylosis without myelopathy and and disc bulging at L5/S1. She saw neurosurgeon who recommended conservative therapies and she did undergo an RFA in October 2021 with significant relief. She does report that her back pain has been very well controlled. She does get some occasional left lower back pain but this is nothing too concerning. She did follow-up fairly recently with her GI specialist regarding her diagnosis of celiac disease. She has been following a gluten-free diet and has been doing well. She does notice diarrhea, fatigue and brain fog if she does have something with gluten in it. She does still continue to have occasional diarrhea. She is going to try the FODMAP diet to see if this might help her symptoms. She has also started a journal to keep track of her symptoms. She is also lactose intolerant so she does avoid lactose as well. She states that she did recently stop drinking caffeine after noon. She was feeling somewhat fatigued the last 2 weeks but this is starting to feel better. She does get some generalized swelling and wonders if this is related to something that she is eating. Unfortunately she developed blepharitis the whole month of November. She was treated with a medication through her eye doctor but unfortunately had an allergic reaction to this so they did have to change it to something else which did eventually clear up her symptoms. She actually thinks that she originally had a reaction to the make-up that she used for Halloween. She is no longer going to use this. She has no other concerns at this time. cmw        Review of Systems   Constitutional:  Positive for fatigue. Negative for chills and fever. HENT:  Negative for congestion, postnasal drip and sore throat. Eyes:  Negative for visual disturbance. Respiratory:  Negative for cough, chest tightness, shortness of breath and wheezing. Cardiovascular: Negative.   Negative for chest pain, palpitations and leg swelling. Gastrointestinal:  Positive for abdominal distention (with eating celiac), abdominal pain (with eating celiac) and diarrhea (loose stools before limiting celiac / still with some diarrhea intermittently). Negative for constipation, nausea and vomiting. Genitourinary:  Negative for decreased urine volume, difficulty urinating, dysuria, frequency and urgency. Musculoskeletal:  Positive for arthralgias, back pain (intermittent) and myalgias. Negative for joint swelling. Skin:  Negative for color change and rash. Neurological:  Negative for dizziness, seizures, weakness, light-headedness, numbness and headaches. Hematological:  Negative for adenopathy. Does not bruise/bleed easily. Psychiatric/Behavioral:  Positive for dysphoric mood (occasional) and sleep disturbance. Negative for agitation and decreased concentration. The patient is nervous/anxious. Objective     Physical Exam  Vitals and nursing note reviewed. Constitutional:       General: She is not in acute distress. Appearance: Normal appearance. She is well-developed. She is obese. She is not ill-appearing, toxic-appearing or diaphoretic. HENT:      Head: Normocephalic. Right Ear: Tympanic membrane, ear canal and external ear normal.      Left Ear: Tympanic membrane, ear canal and external ear normal.      Nose: Nose normal. No congestion. Mouth/Throat:      Mouth: Mucous membranes are moist.      Pharynx: No oropharyngeal exudate. Eyes:      General:         Right eye: No discharge. Left eye: No discharge. Extraocular Movements: Extraocular movements intact. Conjunctiva/sclera: Conjunctivae normal.      Pupils: Pupils are equal, round, and reactive to light. Neck:      Thyroid: No thyromegaly. Vascular: No JVD. Cardiovascular:      Rate and Rhythm: Normal rate and regular rhythm. Pulses: Normal pulses. Heart sounds: Normal heart sounds. No murmur heard.   Pulmonary: Effort: Pulmonary effort is normal.      Breath sounds: Normal breath sounds. No wheezing or rales. Abdominal:      General: Bowel sounds are normal.      Palpations: Abdomen is soft. There is no mass. Tenderness: There is no abdominal tenderness. There is no right CVA tenderness or left CVA tenderness. Musculoskeletal:      Cervical back: Normal range of motion and neck supple. Right lower leg: No edema. Left lower leg: No edema. Lymphadenopathy:      Cervical: No cervical adenopathy. Skin:     General: Skin is warm. Capillary Refill: Capillary refill takes less than 2 seconds. Findings: No rash. Neurological:      General: No focal deficit present. Mental Status: She is alert and oriented to person, place, and time. Psychiatric:         Mood and Affect: Mood normal. Mood is not anxious or depressed. Speech: Speech normal.         Behavior: Behavior normal.         Thought Content: Thought content normal.         Judgment: Judgment normal.                An electronic signature was used to authenticate this note.     --Mirna Copeland MD see chief complaint quote

## 2022-12-15 ENCOUNTER — APPOINTMENT (OUTPATIENT)
Dept: INFUSION THERAPY | Facility: CLINIC | Age: 77
End: 2022-12-15

## 2022-12-15 LAB
BASOPHILS # BLD AUTO: 0.05 K/UL
BASOPHILS NFR BLD AUTO: 0.9 %
EOSINOPHIL # BLD AUTO: 0.02 K/UL
EOSINOPHIL NFR BLD AUTO: 0.4 %
HCT VFR BLD CALC: 19 %
HGB BLD-MCNC: 6.4 G/DL
IMM GRANULOCYTES NFR BLD AUTO: 1.7 %
LYMPHOCYTES # BLD AUTO: 0.5 K/UL
LYMPHOCYTES NFR BLD AUTO: 9.2 %
MAN DIFF?: NORMAL
MCHC RBC-ENTMCNC: 30.3 PG
MCHC RBC-ENTMCNC: 33.7 G/DL
MCV RBC AUTO: 90 FL
MONOCYTES # BLD AUTO: 0.6 K/UL
MONOCYTES NFR BLD AUTO: 11 %
NEUTROPHILS # BLD AUTO: 4.17 K/UL
NEUTROPHILS NFR BLD AUTO: 76.8 %
PLATELET # BLD AUTO: 341 K/UL
RBC # BLD: 2.11 M/UL
RBC # FLD: 17.6 %
WBC # FLD AUTO: 5.43 K/UL

## 2022-12-15 RX ORDER — ERYTHROPOIETIN 10000 [IU]/ML
80000 INJECTION, SOLUTION INTRAVENOUS; SUBCUTANEOUS ONCE
Refills: 0 | Status: COMPLETED | OUTPATIENT
Start: 2022-12-15 | End: 2022-12-15

## 2022-12-15 RX ADMIN — ERYTHROPOIETIN 80000 UNIT(S): 10000 INJECTION, SOLUTION INTRAVENOUS; SUBCUTANEOUS at 16:01

## 2022-12-16 ENCOUNTER — APPOINTMENT (OUTPATIENT)
Dept: INFUSION THERAPY | Facility: CLINIC | Age: 77
End: 2022-12-16

## 2022-12-16 LAB
ABO + RH PNL BLD: NORMAL
BLD GP AB SCN SERPL QL: NORMAL

## 2022-12-22 ENCOUNTER — NON-APPOINTMENT (OUTPATIENT)
Age: 77
End: 2022-12-22

## 2022-12-22 ENCOUNTER — APPOINTMENT (OUTPATIENT)
Dept: INFUSION THERAPY | Facility: CLINIC | Age: 77
End: 2022-12-22

## 2022-12-22 LAB
BASOPHILS # BLD AUTO: 0.05 K/UL
BASOPHILS NFR BLD AUTO: 0.9 %
EOSINOPHIL # BLD AUTO: 0.04 K/UL
EOSINOPHIL NFR BLD AUTO: 0.7 %
HCT VFR BLD CALC: 21 %
HGB BLD-MCNC: 7.1 G/DL
IMM GRANULOCYTES NFR BLD AUTO: 1.5 %
LYMPHOCYTES # BLD AUTO: 0.83 K/UL
LYMPHOCYTES NFR BLD AUTO: 15.4 %
MAN DIFF?: NORMAL
MCHC RBC-ENTMCNC: 30.7 PG
MCHC RBC-ENTMCNC: 33.8 G/DL
MCV RBC AUTO: 90.9 FL
MONOCYTES # BLD AUTO: 0.47 K/UL
MONOCYTES NFR BLD AUTO: 8.7 %
NEUTROPHILS # BLD AUTO: 3.93 K/UL
NEUTROPHILS NFR BLD AUTO: 72.8 %
PLATELET # BLD AUTO: 325 K/UL
RBC # BLD: 2.31 M/UL
RBC # FLD: 17.4 %
WBC # FLD AUTO: 5.4 K/UL

## 2022-12-22 RX ORDER — ERYTHROPOIETIN 10000 [IU]/ML
80000 INJECTION, SOLUTION INTRAVENOUS; SUBCUTANEOUS ONCE
Refills: 0 | Status: COMPLETED | OUTPATIENT
Start: 2022-12-22 | End: 2022-12-22

## 2022-12-22 RX ADMIN — ERYTHROPOIETIN 80000 UNIT(S): 10000 INJECTION, SOLUTION INTRAVENOUS; SUBCUTANEOUS at 13:47

## 2022-12-29 ENCOUNTER — APPOINTMENT (OUTPATIENT)
Dept: INFUSION THERAPY | Facility: CLINIC | Age: 77
End: 2022-12-29

## 2022-12-29 LAB
BASOPHILS # BLD AUTO: 0.05 K/UL
BASOPHILS NFR BLD AUTO: 1 %
EOSINOPHIL # BLD AUTO: 0.05 K/UL
EOSINOPHIL NFR BLD AUTO: 1 %
HCT VFR BLD CALC: 18.8 %
HGB BLD-MCNC: 6.2 G/DL
IMM GRANULOCYTES NFR BLD AUTO: 1.5 %
LYMPHOCYTES # BLD AUTO: 0.84 K/UL
LYMPHOCYTES NFR BLD AUTO: 16.1 %
MAN DIFF?: NORMAL
MCHC RBC-ENTMCNC: 30.7 PG
MCHC RBC-ENTMCNC: 33 G/DL
MCV RBC AUTO: 93.1 FL
MONOCYTES # BLD AUTO: 0.54 K/UL
MONOCYTES NFR BLD AUTO: 10.3 %
NEUTROPHILS # BLD AUTO: 3.66 K/UL
NEUTROPHILS NFR BLD AUTO: 70.1 %
PLATELET # BLD AUTO: 422 K/UL
RBC # BLD: 2.02 M/UL
RBC # FLD: 18.2 %
WBC # FLD AUTO: 5.22 K/UL

## 2022-12-29 RX ORDER — LUSPATERCEPT 75 MG/1
110 INJECTION, POWDER, LYOPHILIZED, FOR SOLUTION SUBCUTANEOUS ONCE
Refills: 0 | Status: COMPLETED | OUTPATIENT
Start: 2022-12-29 | End: 2022-12-29

## 2022-12-29 RX ORDER — ERYTHROPOIETIN 10000 [IU]/ML
80000 INJECTION, SOLUTION INTRAVENOUS; SUBCUTANEOUS ONCE
Refills: 0 | Status: COMPLETED | OUTPATIENT
Start: 2022-12-29 | End: 2022-12-29

## 2022-12-29 RX ADMIN — LUSPATERCEPT 110 MILLIGRAM(S): 75 INJECTION, POWDER, LYOPHILIZED, FOR SOLUTION SUBCUTANEOUS at 15:46

## 2022-12-29 RX ADMIN — ERYTHROPOIETIN 80000 UNIT(S): 10000 INJECTION, SOLUTION INTRAVENOUS; SUBCUTANEOUS at 15:45

## 2022-12-30 ENCOUNTER — APPOINTMENT (OUTPATIENT)
Dept: INFUSION THERAPY | Facility: CLINIC | Age: 77
End: 2022-12-30

## 2022-12-30 LAB
ABO + RH PNL BLD: NORMAL
BLD GP AB SCN SERPL QL: NORMAL

## 2023-01-04 NOTE — PHYSICAL EXAM
Name: Ruddy Velasquez      : 2003      MRN: [id number]  Encounter Provider: Charles Mahmood DO  Encounter Date: 2023   Encounter department: [address] [place] [address]  Pharyngitis, unspecified etiology  - will tx  Will check titers as well as there is concern for ? Mono given partner he is with  Hydration/rest   Can return to school on Friday  - amoxicillin (AMOXIL) 875 mg tablet; Take 1 tablet (875 mg total) by mouth 2 (two) times a day for 7 days  Dispense: 14 tablet; Refill: 0  - Jaclyn Barr Virus Antibody Panel; Future  - CMV IgG/IgM Antibodies; Future    2  Screening examination for STD (sexually transmitted disease)  New partner since Nov   Has been ill, since  We will do repeat STD screen  He is on board  - RPR; Future  - Hepatitis C antibody; Future  - : HIV 1/2 AB/AG w Reflex [facility] for 2 yr old and above; Future  - Chlamydia/GC amplified DNA by PCR; Future    3  Other fatigue  See above  Ramesh Lapidus Virus Antibody Panel; Future  - CMV IgG/IgM Antibodies; Future    4  Dizziness  Dehydration/acute illness  Stay hydrated with electrolyte replacement solutions  Ramesh Lapidus Virus Antibody Panel; Future  - CMV IgG/IgM Antibodies; Future    Keep f/u appt, sooner prn    Patient/Caretaker verbalized understanding and were in agreement with today's assessment and plan  Time was taken to address any questions patient/caretaker had  Indication/Risks/Benefits of medication(s) as prescribed were discussed with the patient/caretaker  The patient verbalized understanding and agreement and elects to take medications as prescribed  Time was taken to answer any questions the patient/caretaker may have had  Chief Complaint   Patient presents with   • Sore Throat       Subjective      Monday with sore throat with halitosis  He does have h/o strep  He has no documented fevers but does have chills    There is a of fatigue with some dizziness  He is also coughing as well  He is not hydrating  There is no vomiting but nausea  No cp, or sob  No edema  There is a new sexual partner since Nov and he has been sick, since  They are interested in STD testing  He cont to be with this partner, things are going well, otherwise  No urinary symptoms/penile discharge, etc       Review of Systems   All other systems reviewed and are negative  Current Outpatient Medications on File Prior to Visit   Medication Sig   • busPIRone (BUSPAR) 15 mg tablet Take 1 tablet (15 mg total) by mouth 3 (three) times a day   • famotidine (PEPCID) 20 mg tablet Take 1 tablet (20 mg total) by mouth 2 (two) times a day as needed for indigestion or heartburn   • FLUoxetine (PROzac) 40 MG capsule Take 1 capsule (40 mg total) by mouth daily   • ondansetron (Zofran ODT) 4 mg disintegrating tablet Take 1 tablet (4 mg total) by mouth every 6 (six) hours as needed for nausea or vomiting   • ketorolac (TORADOL) 10 mg tablet Take 1 tablet (10 mg total) by mouth daily as needed for severe pain Max 1/day, 3/week, 10/month  Do not use with other OTC pain meds (Patient not taking: Reported on 12/7/2022)   • mirtazapine (REMERON) 15 mg tablet Take 1 tablet (15 mg total) by mouth daily as needed (insomnia) (Patient not taking: Reported on 12/7/2022)       Objective     /72 (BP Location: Right arm, Patient Position: Sitting, Cuff Size: Large)   Pulse 72   Temp 98 1 °F (36 7 °C) (Temporal)   Resp 18   Ht 5' 7" (1 702 m)   Wt 97 1 kg (214 lb)   SpO2 97%   BMI 33 52 kg/m²     Physical Exam  Vitals and nursing note reviewed  Constitutional:       General: He is not in acute distress  Appearance: He is well-developed  Comments: Flushed appearance    HENT:      Head: Normocephalic and atraumatic  Right Ear: Tympanic membrane and ear canal normal       Left Ear: Tympanic membrane and ear canal normal       Nose: No congestion        Mouth/Throat:      Mouth: Mucous membranes are moist       Comments: tonsilar hypertrophy with erythema and white exudate, halitosis    Eyes:      Conjunctiva/sclera: Conjunctivae normal       Pupils: Pupils are equal, round, and reactive to light  Cardiovascular:      Rate and Rhythm: Normal rate and regular rhythm  Heart sounds: Normal heart sounds  Pulmonary:      Effort: Pulmonary effort is normal       Breath sounds: Normal breath sounds  No wheezing, rhonchi or rales  Abdominal:      General: Bowel sounds are normal       Palpations: Abdomen is soft  Musculoskeletal:         General: No deformity  Cervical back: Neck supple  Lymphadenopathy:      Cervical: No cervical adenopathy  Skin:     General: Skin is warm and dry  Neurological:      General: No focal deficit present  Mental Status: He is alert and oriented to person, place, and time  Psychiatric:         Mood and Affect: Mood normal          Behavior: Behavior normal          Thought Content:  Thought content normal          Judgment: Judgment normal        Amy Maralyn Duane, DO [Fully active, able to carry on all pre-disease performance without restriction] : Status 0 - Fully active, able to carry on all pre-disease performance without restriction [Normal] : grossly intact [de-identified] : pharynx clear, no exudates [de-identified] : Abscess on left scapular region has improved, new abscess interscapular area. [de-identified] : Abscess draining purulent fluid, left scapular region. tender to touch.

## 2023-01-05 ENCOUNTER — EMERGENCY (EMERGENCY)
Facility: HOSPITAL | Age: 78
LOS: 0 days | Discharge: HOME | End: 2023-01-05
Attending: EMERGENCY MEDICINE | Admitting: EMERGENCY MEDICINE
Payer: MEDICARE

## 2023-01-05 ENCOUNTER — APPOINTMENT (OUTPATIENT)
Dept: INFUSION THERAPY | Facility: CLINIC | Age: 78
End: 2023-01-05
Payer: MEDICARE

## 2023-01-05 ENCOUNTER — APPOINTMENT (OUTPATIENT)
Dept: HEMATOLOGY ONCOLOGY | Facility: CLINIC | Age: 78
End: 2023-01-05
Payer: MEDICARE

## 2023-01-05 VITALS
SYSTOLIC BLOOD PRESSURE: 142 MMHG | TEMPERATURE: 97.2 F | DIASTOLIC BLOOD PRESSURE: 75 MMHG | HEART RATE: 90 BPM | HEIGHT: 62 IN

## 2023-01-05 VITALS
TEMPERATURE: 100 F | DIASTOLIC BLOOD PRESSURE: 73 MMHG | RESPIRATION RATE: 18 BRPM | SYSTOLIC BLOOD PRESSURE: 188 MMHG | OXYGEN SATURATION: 100 % | HEART RATE: 89 BPM

## 2023-01-05 DIAGNOSIS — N18.9 CHRONIC KIDNEY DISEASE, UNSPECIFIED: ICD-10-CM
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
## 2023-01-05 DIAGNOSIS — Z79.84 LONG TERM (CURRENT) USE OF ORAL HYPOGLYCEMIC DRUGS: ICD-10-CM

## 2023-01-05 DIAGNOSIS — D46.9 MYELODYSPLASTIC SYNDROME, UNSPECIFIED: ICD-10-CM

## 2023-01-05 DIAGNOSIS — M25.561 PAIN IN RIGHT KNEE: ICD-10-CM

## 2023-01-05 DIAGNOSIS — E11.22 TYPE 2 DIABETES MELLITUS WITH DIABETIC CHRONIC KIDNEY DISEASE: ICD-10-CM

## 2023-01-05 DIAGNOSIS — Z91.81 HISTORY OF FALLING: ICD-10-CM
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
## 2023-01-05 DIAGNOSIS — M71.21 SYNOVIAL CYST OF POPLITEAL SPACE [BAKER], RIGHT KNEE: ICD-10-CM

## 2023-01-05 LAB
BASOPHILS # BLD AUTO: 0.07 K/UL
BASOPHILS NFR BLD AUTO: 0.8 %
EOSINOPHIL # BLD AUTO: 0.03 K/UL
EOSINOPHIL NFR BLD AUTO: 0.4 %
HCT VFR BLD CALC: 22.2 %
HGB BLD-MCNC: 7.4 G/DL
IMM GRANULOCYTES NFR BLD AUTO: 1.2 %
LYMPHOCYTES # BLD AUTO: 0.78 K/UL
LYMPHOCYTES NFR BLD AUTO: 9.3 %
MAN DIFF?: NORMAL
MCHC RBC-ENTMCNC: 29.6 PG
MCHC RBC-ENTMCNC: 33.3 G/DL
MCV RBC AUTO: 88.8 FL
MONOCYTES # BLD AUTO: 1.03 K/UL
MONOCYTES NFR BLD AUTO: 12.2 %
NEUTROPHILS # BLD AUTO: 6.4 K/UL
NEUTROPHILS NFR BLD AUTO: 76.1 %
PLATELET # BLD AUTO: 329 K/UL
RBC # BLD: 2.5 M/UL
RBC # FLD: 16.6 %
WBC # FLD AUTO: 8.41 K/UL

## 2023-01-05 PROCEDURE — 99215 OFFICE O/P EST HI 40 MIN: CPT

## 2023-01-05 PROCEDURE — 99284 EMERGENCY DEPT VISIT MOD MDM: CPT | Mod: 25

## 2023-01-05 PROCEDURE — 93971 EXTREMITY STUDY: CPT | Mod: 26,RT

## 2023-01-05 PROCEDURE — 29505 APPLICATION LONG LEG SPLINT: CPT

## 2023-01-05 PROCEDURE — 73564 X-RAY EXAM KNEE 4 OR MORE: CPT | Mod: 26,RT

## 2023-01-05 PROCEDURE — 73590 X-RAY EXAM OF LOWER LEG: CPT | Mod: 26,RT

## 2023-01-05 RX ORDER — ERYTHROPOIETIN 10000 [IU]/ML
80000 INJECTION, SOLUTION INTRAVENOUS; SUBCUTANEOUS ONCE
Refills: 0 | Status: COMPLETED | OUTPATIENT
Start: 2023-01-05 | End: 2023-01-05

## 2023-01-05 RX ORDER — PREGABALIN 225 MG/1
1000 CAPSULE ORAL ONCE
Refills: 0 | Status: COMPLETED | OUTPATIENT
Start: 2023-01-05 | End: 2023-01-05

## 2023-01-05 RX ORDER — IBUPROFEN 200 MG
600 TABLET ORAL ONCE
Refills: 0 | Status: COMPLETED | OUTPATIENT
Start: 2023-01-05 | End: 2023-01-05

## 2023-01-05 RX ADMIN — ERYTHROPOIETIN 80000 UNIT(S): 10000 INJECTION, SOLUTION INTRAVENOUS; SUBCUTANEOUS at 12:02

## 2023-01-05 RX ADMIN — PREGABALIN 1000 MICROGRAM(S): 225 CAPSULE ORAL at 12:02

## 2023-01-05 RX ADMIN — Medication 600 MILLIGRAM(S): at 13:56

## 2023-01-05 NOTE — ED PROVIDER NOTE - OBJECTIVE STATEMENT
77y F pmh DM, CKD, MDS presents for eval of R knee pain. Pt states she tripped and slipped down a couple steps x1wk ago but since has mild aching pain localized to posterior R knee, aggravated with wb, relieved at rest. Associated swelling. Denies head trauma, loc, ac, cp, sob, weakness, numbness, dysuria, hematuria, n/v/d/c

## 2023-01-05 NOTE — ED PROVIDER NOTE - PATIENT PORTAL LINK FT
You can access the FollowMyHealth Patient Portal offered by [facility] by registering at the following website: [email or web address]. By joining [employer] [facility]’s FollowMyHealth portal, you will also be able to view your health information using other applications (apps) compatible with our system.

## 2023-01-05 NOTE — ED PROVIDER NOTE - CARE PROVIDER_API CALL
Jv Escoto)  Surgery; Vascular Surgery  [address] [place] [address]  Phone: [phone]  Fax: [phone]  Follow Up Time:

## 2023-01-05 NOTE — ED PROVIDER NOTE - NSFOLLOWUPINSTRUCTIONS_ED_ALL_ED_FT
Follow up with PMD and Vascular in 1-2 days.    Baker Cyst  A Baker cyst, also called a popliteal cyst, is a sac-like growth that forms at the back of the knee. The cyst forms when the fluid-filled sac (bursa) that cushions the knee joint becomes enlarged. The bursa that becomes a Baker cyst is located at the back of the knee joint.    What are the causes?  In most cases, a Baker cyst results from another knee problem that causes swelling inside the knee. This makes the fluid inside the knee joint (synovial fluid) flow into the bursa behind the knee, causing the bursa to enlarge.    What increases the risk?  You may be more likely to develop a Baker cyst if you already have a knee problem, such as:  A tear in cartilage that cushions the knee joint (meniscal tear).  A tear in the tissues that connect the bones of the knee joint (ligament tear).  Knee swelling from osteoarthritis, rheumatoid arthritis, or gout.  What are the signs or symptoms?  A Baker cyst does not always cause symptoms. A lump behind the knee may be the only sign of the condition. The lump may be painful, especially when the knee is straightened. If the lump is painful, the pain may come and go. The knee may also be stiff.    Symptoms may quickly get more severe if the cyst breaks open (ruptures). If your cyst ruptures, signs and symptoms may affect the knee and the back of the lower leg (calf) and may include:  Sudden or worsening pain.  Swelling.  Bruising.  How is this diagnosed?  This condition may be diagnosed based on your symptoms and medical history. Your health care provider will also do a physical exam. This may include:  Feeling the cyst to check whether it is tender.  Checking your knee for signs of another knee condition that causes swelling.  You may have imaging tests, such as:  X-rays.  MRI.  Ultrasound.  How is this treated?  A Baker cyst that is not painful may go away without treatment. If the cyst gets large or painful, it will likely get better if the underlying knee problem is treated.    Treatment for a Baker cyst may include:  Resting.  Keeping weight off of the knee. This means not leaning on the knee to support your body weight.  NSAIDs to reduce pain and swelling.  A procedure to drain the fluid from the cyst with a needle (aspiration). You may also get an injection of a medicine that reduces swelling (steroid).  Surgery. This may be needed if other treatments do not work. This usually involves correcting knee damage and removing the cyst.  Follow these instructions at home:  Image   Take over-the-counter and prescription medicines only as told by your health care provider.  Rest and return to your normal activities as told by your health care provider. Avoid activities that make pain or swelling worse. Ask your health care provider what activities are safe for you.  Keep all follow-up visits as told by your health care provider. This is important.  Contact a health care provider if:  You have knee pain, stiffness, or swelling that does not get better.  Get help right away if:  You have sudden or worsening pain and swelling in your calf area.  This information is not intended to replace advice given to you by your health care provider. Make sure you discuss any questions you have with your health care provider.

## 2023-01-05 NOTE — ED PROVIDER NOTE - ATTENDING APP SHARED VISIT CONTRIBUTION OF CARE
77-year-old female history of DM, MDS, status post slip and fall down approximately 3-4 steps last week, reports worsening right knee pain and swelling, sx are constant, worse with certain movements and position, better with rest, denies head trauma, LOC, other injuries at present, denies fever, tactile temp, chills, paresthesias, focal weakness, or other associated complaints at present. Old chart reviewed. I have reviewed and agree with the initial nursing note, except as documented in my note.    VSS, awake, alert, no skin lacerations or abrasions, RLE; no hip, ankle or foot tenderness, knee; + swelling / ttp, otherwise appears symmetrical, no gross deformity, crepitus, joint line tenderness, able to fully flex and extend knee without encouragement, no joint laxity noted on varus or valgus stress at 0 and 30 degrees, normal Lachman and posterior drawer, motor and sensation intact distally, NV intact with 2+ DP pulses, antalgic gait.  No warmth, erythema, induration, fluctuance, lymphangitis or purulence.

## 2023-01-05 NOTE — ED PROVIDER NOTE - CARE PLAN
1 Principal Discharge DX:	Bakers cyst, right   Principal Discharge DX:	Bakers cyst, right  Secondary Diagnosis:	Knee pain  Secondary Diagnosis:	Fall

## 2023-01-05 NOTE — ED PROVIDER NOTE - PHYSICAL EXAMINATION
CONST: Well appearing in NAD  EYES: Sclera and conjunctiva clear.   ENT: No nasal discharge. Oropharynx normal appearing, no erythema or exudates. No abscess or swelling. Uvula midline.   NECK: Non-tender, no meningeal signs. normal ROM. supple   CARD: S1 S2; No jvd  RESP: Equal BS B/L, No wheezes, rhonchi or rales. No distress  GI: Soft, non-tender, non-distended. no cva tenderness. normal BS  MS: Tenderness to posterior R knee. Normal ROM in all extremities. pulses 2 +. R calf tenderness  SKIN: Warm, dry, no acute rashes. Good turgor  NEURO: A&Ox3, No focal deficits. Strength 5/5 with no sensory deficits. Steady gait.

## 2023-01-06 ENCOUNTER — NON-APPOINTMENT (OUTPATIENT)
Age: 78
End: 2023-01-06

## 2023-01-07 PROBLEM — Z91.81 HISTORY OF RECENT FALL: Status: ACTIVE | Noted: 2023-01-07

## 2023-01-09 NOTE — PHYSICAL EXAM
[Restricted in physically strenuous activity but ambulatory and able to carry out work of a light or sedentary nature] : Status 1- Restricted in physically strenuous activity but ambulatory and able to carry out work of a light or sedentary nature, e.g., light house work, office work [Normal] : affect appropriate [de-identified] : On a wheelchair, unable to walk due to severe pain and limited ROM right knee from a fall [de-identified] : Patient examined on a wheelchair, unable to get up to the exam table [de-identified] : Right knee has very limited ROM

## 2023-01-09 NOTE — ASSESSMENT
[FreeTextEntry1] : Patient is a 77 year old female with # Lowrisk myelodysplastic syndrome, previously treated with Revlimid and Procrit and Vidaza . 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 Hgb 7.4 gm/dL, stable moderate anemia.\par -- Continue Retacrit (Procrit)  80,000 units every week.\par Side effects of Procrit discussed including but not limited to: hypertension, headache, pruritus, nausea, vomiting, injection site pain, arthralgia, cough, fever.\par -- Monitor CBC weekly and administer 1 PRBC unit  as needed when Hgb < 7 gm/dL.  Pt has e/o iron overload so transfusions need to be kept at a minimum if possible.\par \par # Vitamin  B 12 deficiency\par -- Continue Vitamin B 12 injection every month\par \par # Thrombocytosis\par -- S/P Hydrea\par -- Will continue to monitor.\par \par #S/P fall\par -- Will send to the ER for further evaluation and management.\par -- Patient may need VNS \par \par Results of  BM biopsy d/w pt. No e/o increased blasts. Pt is needing PRBCs almost every 3 weeks. Advised to follow up with Dr Ferrell to consider any other option or clinical trial. Called Dr Ferrell's office and discussed case with her. No clinical trial available, Rec PRBCS.\par \par RTC in 3 weeks\par \par Case was seen and discussed with Dr. Wade who agreed with assessment and plan.\par

## 2023-01-09 NOTE — REVIEW OF SYSTEMS
[Recent Change In Weight] : ~T recent weight change [Negative] : Allergic/Immunologic [Fatigue] : no fatigue [Shortness Of Breath] : no shortness of breath [Diarrhea] : no diarrhea [FreeTextEntry2] : weight loss 5 lbs in the last 4 months; pale in appearance [FreeTextEntry9] : + Severe right knee pain with limited ROM, s/p fall

## 2023-01-09 NOTE — HISTORY OF PRESENT ILLNESS
[de-identified] : She missed on appointment for procrit last week.\par She starts her next cycle on 6/25/18\par C/o back pain radiating down her left which occurred when she bent to  something.\par No change in diarrhea.\par \par 7/31/18:\par Doing well. On Revlimid for more than 2yrs, 5 mg 2 weeks on 1 week off. She will be starting week on tonight. \par C/o diarrhea. On Imodium 2 pills AM and 2 pills PM. Doesn't help much with diarrhea. \par C/o nausea, abdominal pain. \par \par \par Colonoscopy in 2016 was normal. \par Did not have blood transfusion for over 2 years. \par On procrit [id number] weekly. Missed last week on 7/24/18 as she was out of town. She has been non compliant with weekly Procrit.\par Mammogram in 2017 was normal. \par \par 9/11/18\par pt is here for follow up.\par She feels the lomotil helps with the diarrhea.\par She was away for a few weeks and missed her procrit injections.\par No fever, abdominal  discomfort has been less since since use of lomotil.\par She started a new cycle 2 days ago.\par \par 10/2/18:\par She will start Revlimid tonight (week on). 2 weeks on, 1 week off. \par On ASA 81mg. \par Denies fever, nausea, vomiting, chest pain, SOB, abdominal pain, and bladder problems.\par C/o diarrhea. \par S/p 2 units PRBC transfusion on 9/25/18. \par On Procrit 06480 units weekly. Not compliant every week. \par C/o intermittent dizziness. \par \par 10/31/18\par Pt is here for follow up.\par C/O significant fatigue.\par Continues to have diarrhea, takes imodium and lomotil as needed.\par C/o dry cough, no fever.\par Cough started a month ago. It is worse in the mornings however over last week it has been constant all day.\par No chest pain.\par She was found to have low B12 levels and was started on B12 injections.\par \par 11/27/18:\par Doing well. Hospitalized for 3 days for cellulitis/abcess of the back s/p drainage and on Abx currently. Developed 3 days after Mg infusion as per pt. \par Denies nausea, vomiting, chest pain, SOB, abdominal pain, bowel and bladder problems.\par This is the week on Revlimid (week 1).\par S/p 1 unit PRBC transfusion in the hospital. \par On Procrit weekly \par \par 12/27/18:\par Doing well. No major complaints.\par Denies fever, nausea, vomiting, chest pain, SOB, abdominal pain, and bladder problems.\par On Revlimid 5 mg 2 weeks on 1 week off. This is the week off. She will start the week on sunday (12/30/18).\par Due for Procrit 33231 units today. \par Still has diarrhea. 4-5bm/day.\par On monthly B12 injections. \par \par 1/30/19:\par Patient here for follow up for MDS.  She is taking Revlimid 5 mg, 2 weeks on, 1 week off.  She will complete this current cycle on Saturday.  She has no new complaints today.  Patient denies cough, shortness of breath, denies fever, denies bone pain.\par \par 03/04/2019\par Patient is here for a follow up visit. She complaints of severe pain in her left shoulder and has had abscess drained 2 weeks ago. However the pain is worse and she has purulent discharge on examination. She also has Nasal sores that are painful. Culture from 11/2018 showed MRSA.  Denies Fever. \par She also complaints of swelling in her right leg. Not tender and not erythematous and negative Gong;s sign. \par Her diarrhea with Revlimid is ongoing and Imodium and Lomotil helps but not everyday. \par She is on 60,000 units of procrit weekly and Revlimid 5 mg 2 weeks on 1 week off. \par \par 4/23/19\par Pt is here for follow up.\par She feels well.\par The nasal sores have improved with bactroban\par The abscess on left shoulder has resolved.\par However she has a new one on the middle of her back.\par No fever.\par Pt has been on procrit 50306 units weekly, however she missed a dose in between.\par \par 5/8/19\par pt is here for follow up.\par no new complaints.\par feels well.\par Her skin abscess has resolved.\par she has been unable to go to ID, as she could not get an appt.\par No bleeding.\par She is compliant with revlimid.\par \par 6/6/19\par Pt is here for follow up.\par no new complaints \par Feels well.\par Is on week 2 of her Revlimid cycle. \par No transfusions since last visit\par \par 7/17/19\par Pt is here for follow up.\par C/O fatigue.\par Was away for a few days two weeks ago, but missed treatment with procrit for 2 weeks.\par Is complaint with Revlimid 2 weeks on 1 week off.\par Diarrhea is a little improved.\par \par 8/14/19\par Patient here for follow up visit, feeling well.  Although she is complaining of some pain at the left scapula area recently.  Patient denies cough, shortness of breath, denies fever, denies other bone pain.  She is off Revlimid  this week, due to restart on Monday.  She is scheduled for Procrit and Vitamin B12 injection today.  She plans to leave the country tomorrow to visit her mother who is ill.  She will return in about 10 days.\par \par 9/11/19\par Pt is here for follow up.\par She was away for 3 weeks, out of which she took procrit for 2 weeks in [place].\par She missed last week's dose.\par She had CBCs in [place] which showed Hgb of 8.9\par She feels well.\par She still getting diarrhea.\par She has been using lomotil with very minimal relief.\par She started a new cycle of Revlimid 4 days ago.\par \par 10/3/19\par Patient here for follow up visit, feeling fairly well.   Patient denies cough, shortness of breath, denies fever, denies other bone pain.  She remains on Revlimid.  She is scheduled for Procrit and Vitamin B12 injection today.\par \par 10/24/19\par Pt is here for follow up.\par Feels well.\par No SOB, fatigue.\par Compliant with procrit and Revl;imid.\par Remains on ASa.\par Diarrhea is unchanged.\par Pt takes imodium as needed.\par \par 11/14/19\par Pt is here for follow up.\par No new complaints.\par Starts a new cycle of Revlimid today.\par Diarrhea is same as before, no rectal bleeding.\par No fever.\par \par 12/5/19\par Pt is here for follow up.\par No new complaints.\par Denies feeling weaker than usual.\par No fever, SOB.\par No change in frequency of diarrhea.\par No pain.\par Will start next cycle of Revlimid in 3 days.\par \par \par !/16/20\par Pt was recently DCed from [facility] 3 days ago after being treated for pneumonia and MARCO.\par She is on po Levaquin.\par She restarted Revlmid 3 days ago.\par She is feeling better now. No fever.\par No SOB, Chest pain and back pain has resolved.\par Pt has a cough, no expectoration.\par She also recd a unit of PRBCs last week in the hospital\par \par \par 1/30/20\par Patient here for follow up visit, feeling well.  She has no new complaints. Cough is almost gone completely.  Patient denies shortness of breath, denies fever, denies bone pain.  Her appetite is ok, weight is stable.  She is due to restart Revlimid on Monday.\par \par 02/20/20\par Pt is here for follow up, feeling well.\par Offers no new complaints. Denies SOB, fever, chills, weight loss, CP, cough. \par Currently off week of Revlimid 5 mg. Restarts on Monday.\par Has procrit 80,000 units today. Next b 12 injection due in 2 weeks \par Did not get chest Xray\par CBC reviewed WBC 3.1, h/h 8.3/25%, plt 386, neutrophils 1.29\par \par 3/12/20\par Pt is here for follow up.\par She took Revlimid for 2 weeks and then has been off for one week although she was advised to take it daily without break.\par No SOB, Cough, fever.\par Has mild fatigue.\par \par 04/02/2020\par SIMONA KUHN a 75 year F is here today for follow up visit of MDS.\par Denies fever, chills, cough,night sweats, weight loss. \par Denies bleeding or bruising.\par Pt receives procrit 80,000 units weekly and B12 IM monthly. \par Continued Revlimid 5 mg daily x 3 weeks, has 1 dose left tonight. \par CBC reviewed: WBC 3.2, H/H 8.1/25.2, neutrophils 1.6, PLT 72\par \par 4/20/2020: The patient is here for follow up . She has no complaints of fever, chills, dizziness , chest pain and shortness of breath . She is still with diarrhea , up to 10 watery BMs per day, responds poorly to imodium and lomotil. She is on Revlimid 5 mg daily , took last dose yesterday night. Her last Procrit was on April 13. \par \par 5/26/20\par Pt is here for follow up.\par She is feeling well. Denies SOB, chest pain, fever.\par Continues to have diarrhea although she is off of Revlmid.\par Thinks it may due to diabetic meds.\par Wants to discuss BM bx results\par \par 6/22/20\par Pt is here for follow up.\par Feels well. Denies any fever, N, V, D\par Continues to have diarrhea, she will talk to her PCP about changing metformin for DM.\par Has been needing PRBCs 1 unit each month\par \par 7/20/20\par Pt is here for follow up.\par No new complaints. Has been feeling well.\par No SOB, CP. \par No N, V, D.\par She has recd 2 units of PRBCs since May 20.\par \par 8/17/20\par Pt is here for a follow up visit.\par She is feeling well.\par No new symptoms. No N, V, D.\par No bruising or bleeding. She continues to need PRBCs every 2-3 weeks.\par \par 8/31/20\par Pt is here for follow up. She is feeling well. No N, V, D.\par She is tolerating the hydrea well. No SOB, CP\par No bruising ior bleeding\par \par 9/8/20\par Pt is here for follow up.\par She had been contacted by the [facility] last week to advise her to stop the hydrea. Pt did not check her messages and continued with Hydrea.\par No fever, N, V, bleeding.\par Saw Dr Ferrell last week.\par \par 9/14/20\par Pt is here for folow up.\par Besides her usual complaint of diarrhea she is feeling well.\par Recd 1 unit PRBC last week.\par Has stopped Hydrea.\par No fever, mouth sores.\par \par 9/29/20\par Pt is here for folow up.\par No new complaints.\par Is seeing GI for diarrhea net week.\par No fever, night sweats.\par REcd 3 units of PRBC this month\par \par 10/13/20\par Pt is here for follow up.\par No new complaints.\par Has not needed a transfusion since 3 weeks ago.\par Continues to have diarrhea, waiting to be seen by GI\par \par 10/26/20\par Pt is here for follow up.\par C/O feeling fatigued.\par Has CLARK.\par No nausea or vomiting.\par No fever.\par Diarrhea has improved a little. She is no longer on Metformin\par \par 11/9/20\par Pt is here for follow up.\par Feels well. Denies SOB, CP, fatigue\par \par 12/7/20- Patient is for follow up and is due for cycle 9 of Vidaza.  She still has loose BM sometimes 10 times a day and was seeing Dr. Corey from GI- did not follow up recently and is unable to get an appt with him. She has lost about 10 lbs since September. No N/V. No fever or chills. No CP/SOB. She has been getting PRBC transfusion every 3 weeks now\par \par 01/04/20 Pt presented today for f/u visit . She is s/p 8 cycles of vidaza and was started on Luspatercept in Dec , 2020 . So far she received 1 injection , she is due for 2nd injection today . Adverse effect profile was discussed with her in detail , she reports having some dizziness and weakness after first injection . She received blood transfusion last wk . Blood work from today reviewed as well and counts are adequate . She denies any fevers,  chills,  or any new symptoms . \par \par 1/25/21\par Pt is here for follow up.\par C/O diarrhea, otherwise feeling better.\par Has not needed a transfusion since 12/29\par \par 2/16/21\par Pt is here for follow up.\par Needed transfusion on 2/8/21.\par Continues to C/o fatigue. Diarrhea remains the same, has not made GI appt as yet.\par \par 3/8/21\par Pt is here for follow up.\par Feels better today. Recd 1 unit PRBCs last week.\par Diarrhea is same as before. has an appt with GI next week.\par No fever\par \par 3/30/21\par Pt is here for follow up.\par Feels better. Last transfusion was on 3/2/21\par Saw GI and was started on cholestyramine and metformin was DCED with resolution of diarrhea.\par She denies any SOB,\par Fatigue is better\par \par 4/20/2021\par Pt is here to f/u for MDS\par She is s/p Luspatercept cycle #6\par She is compliant with Aspirin\par She feels better today, appetite is good\par She denies shortness of breath, fatigue, or weakness \par She c/o of diarrhea but it has improved since she was started on Cholestyramine. Stool is soft and less in frequency\par She received COVID vaccine x 2 doses\par \par 5/11/21\par pt is here for follow up.\par Feels the same with fatigue, diarrhea.\par No SOB\par \par 6/21/21\par Pt is here for follow up.\par Feels well. No SOB, CP, N< V.\par Diarrhea is better controlled now.\par Last PRBC transfusion was 2 weeks ago.\par \par 7/19/21\par Pt is here for follow up.\par Feels a little tired, last transfusion was 6/1/21.\par No bleeding, fever, SOB\par \par 8/10/2021\par Patient is here to follow up for her MDS.\par She is on Luspatercept, tolerating well.\par She feels well today, the appetite is good.\par She denies shortness of breath, fatigue, fever, night sweats, abdominal pain, arthralgias/myalgias.\par \par 8/31/21\par Pt is here for follow up.\par C/O feeling very fatigued.\par No bleeding.\par No fever.\par \par 9/21/2021\par Patient is here to follow up for MDS.\par She is on Luspatercept and Retacrit, tolerating well.\par She gets blood transfusion as needed for Hgb <7 gm/dL\par She is c/o of fatigue, no shortness of breath, dizziness, chills or lightheadedness.\par She denies melena or hematochezia.\par Her appetite is good, no nausea/vomiting.\par \par 10/28/2021\par Patient is here to follow up for MDS.\par She is on Luspatercept and Retacrit, tolerating well.\par She gets blood transfusion to keep Hgb > 7 gm/dL.\par She is c/o of chronic fatigue, no shortness of breath, dizziness, chills or lightheadedness.\par She denies melena or hematochezia.\par Her appetite is good, no nausea/vomiting.\par She c/o of severe diarrhea, 10-12x/day watery stool while on Imodium in the last 2 weeks.\par Last colonoscopy was in 2016, benign as per patient.\par \par 11/18/21\par Pt is here for follow up. C/O fatigue. No SOB, CP\par \par 12/9/21\par Pt is here for follow up.\par Feels better today. Recd 2 units last week in ER.\par Planning to go to [place] for over a week and does not want to miss her procrit dose.\par Diarrhea is stable,\par \par 12/23/21\par Pt is here for follow up.\par Feels better. No SOB, CP. Going to [place] tomorrow. Has not been able to get Procrit injection from the pharmacy yet d/t insurance issues\par \par 2/3/22\par Pt is here for follow up. Feeling same as usual. No SOB, CP. Last transfusion was 2 weeks ago in ER>\par No bleeding reported\par \par 3/3/22\par Pt is here for follow up.\par C/O fatigue. Had black stools X2\par \par 3/30/22\par Pt is feeling well.\par Here for treatment and BM biopsy\par \par 4/21/22\par Pt is here fir follow up.\par Was in ER last week. Was give 2 units of PRBCs.\par Pt feels less tired today.\par Wants to discuss BM rslts\par \par 5/12/22\par Pt is here today for follow up visit.\par Pt c/o feeling tired.  Hgb=6.9.  One unit transfusion scheduled.\par \par 6/23/22\par Pt is here for follow up. Feels well today. No SOB, chest pain.\par Has not made appt with [facility]\par \par 7/14/22\par Pt is here for follow up for lowrisk myelodysplastic syndrome.\par She came in late today because she was not feeling well this morning- dizziness & weakness.\par She denies SOB, CP.\par \par 9/22/22\par Pt is here for follow up.\par C/O fatigue. was in the ER 3 weeks ago and recd 2 U PRBCS\par \par 11/10/22\par Patient is here to follow up for MDS/MPN.\par S/P 2 units pRBC last weekend, feeling much better now.\par She denies shortness of breath, chest pain or headache.\par \par 1/5/2023\par Patient is here to follow up for MDS/MPN, accompanied by spouse and daughter.\par She is feeling much better after she received blood transfusion on 12/29/22.\par She denies shortness of breath, chest pain, dizziness or headache.\par Pt fell on 12/28/22 when she slipped while getting down the stairs and landed on her right knee. Now c/o of severe right knee pain with limited ROM. She has not been evaluated since the incident happened. She is taking Aleeve with minimal relief.\par \par  [de-identified] : This is a 75 year old  female with history of MDS. She's was previously treated with Revlimid 5 mg, 2 weeks on, 1 week off.  \par She is also on Procrit.\par  She underwent colonoscopy (2/2017)  Results noted no colitis, only hyperplastic polyp noted. \par  \par

## 2023-01-10 ENCOUNTER — APPOINTMENT (OUTPATIENT)
Dept: INFUSION THERAPY | Facility: CLINIC | Age: 78
End: 2023-01-10

## 2023-01-10 ENCOUNTER — APPOINTMENT (OUTPATIENT)
Dept: HEMATOLOGY ONCOLOGY | Facility: CLINIC | Age: 78
End: 2023-01-10

## 2023-01-12 ENCOUNTER — LABORATORY RESULT (OUTPATIENT)
Age: 78
End: 2023-01-12

## 2023-01-12 ENCOUNTER — OUTPATIENT (OUTPATIENT)
Dept: OUTPATIENT SERVICES | Facility: HOSPITAL | Age: 78
LOS: 1 days | Discharge: HOME | End: 2023-01-12

## 2023-01-12 ENCOUNTER — APPOINTMENT (OUTPATIENT)
Dept: INFUSION THERAPY | Facility: CLINIC | Age: 78
End: 2023-01-12

## 2023-01-12 DIAGNOSIS — D64.9 ANEMIA, UNSPECIFIED: ICD-10-CM

## 2023-01-12 DIAGNOSIS — Z91.81 HISTORY OF FALLING: ICD-10-CM

## 2023-01-12 DIAGNOSIS — Z51.11 ENCOUNTER FOR ANTINEOPLASTIC CHEMOTHERAPY: ICD-10-CM

## 2023-01-12 DIAGNOSIS — D46.9 MYELODYSPLASTIC SYNDROME, UNSPECIFIED: ICD-10-CM

## 2023-01-12 DIAGNOSIS — E53.8 DEFICIENCY OF OTHER SPECIFIED B GROUP VITAMINS: ICD-10-CM

## 2023-01-12 LAB
HCT VFR BLD CALC: 22.1 %
HGB BLD-MCNC: 7 G/DL
MCHC RBC-ENTMCNC: 27.8 PG
MCHC RBC-ENTMCNC: 31.7 G/DL
MCV RBC AUTO: 87.7 FL
PLATELET # BLD AUTO: 298 K/UL
PMV BLD: 11.4 FL
RBC # BLD: 2.52 M/UL
RBC # FLD: 18.6 %
WBC # FLD AUTO: 7.26 K/UL

## 2023-01-12 RX ORDER — ERYTHROPOIETIN 10000 [IU]/ML
80000 INJECTION, SOLUTION INTRAVENOUS; SUBCUTANEOUS ONCE
Refills: 0 | Status: COMPLETED | OUTPATIENT
Start: 2023-01-12 | End: 2023-01-12

## 2023-01-12 RX ADMIN — ERYTHROPOIETIN 80000 UNIT(S): 10000 INJECTION, SOLUTION INTRAVENOUS; SUBCUTANEOUS at 14:05

## 2023-01-13 ENCOUNTER — NON-APPOINTMENT (OUTPATIENT)
Age: 78
End: 2023-01-13

## 2023-01-17 ENCOUNTER — NON-APPOINTMENT (OUTPATIENT)
Age: 78
End: 2023-01-17

## 2023-01-18 ENCOUNTER — NON-APPOINTMENT (OUTPATIENT)
Age: 78
End: 2023-01-18

## 2023-01-19 ENCOUNTER — APPOINTMENT (OUTPATIENT)
Dept: HEMATOLOGY ONCOLOGY | Facility: CLINIC | Age: 78
End: 2023-01-19
Payer: MEDICARE

## 2023-01-19 ENCOUNTER — APPOINTMENT (OUTPATIENT)
Dept: INFUSION THERAPY | Facility: CLINIC | Age: 78
End: 2023-01-19

## 2023-01-19 ENCOUNTER — NON-APPOINTMENT (OUTPATIENT)
Age: 78
End: 2023-01-19

## 2023-01-19 ENCOUNTER — LABORATORY RESULT (OUTPATIENT)
Age: 78
End: 2023-01-19

## 2023-01-19 LAB
HCT VFR BLD CALC: 19.1 %
HGB BLD-MCNC: 6.2 G/DL
MCHC RBC-ENTMCNC: 28.4 PG
MCHC RBC-ENTMCNC: 32.5 G/DL
MCV RBC AUTO: 87.6 FL
PLATELET # BLD AUTO: 103 K/UL
PMV BLD: 12 FL
RBC # BLD: 2.18 M/UL
RBC # FLD: 19.4 %
WBC # FLD AUTO: 6.49 K/UL

## 2023-01-19 PROCEDURE — 99215 OFFICE O/P EST HI 40 MIN: CPT

## 2023-01-19 RX ORDER — LUSPATERCEPT 75 MG/1
110 INJECTION, POWDER, LYOPHILIZED, FOR SOLUTION SUBCUTANEOUS ONCE
Refills: 0 | Status: COMPLETED | OUTPATIENT
Start: 2023-01-19 | End: 2023-01-19

## 2023-01-19 RX ORDER — ERYTHROPOIETIN 10000 [IU]/ML
80000 INJECTION, SOLUTION INTRAVENOUS; SUBCUTANEOUS ONCE
Refills: 0 | Status: COMPLETED | OUTPATIENT
Start: 2023-01-19 | End: 2023-01-19

## 2023-01-19 RX ADMIN — ERYTHROPOIETIN 80000 UNIT(S): 10000 INJECTION, SOLUTION INTRAVENOUS; SUBCUTANEOUS at 13:53

## 2023-01-19 RX ADMIN — LUSPATERCEPT 110 MILLIGRAM(S): 75 INJECTION, POWDER, LYOPHILIZED, FOR SOLUTION SUBCUTANEOUS at 14:29

## 2023-01-20 ENCOUNTER — APPOINTMENT (OUTPATIENT)
Dept: INFUSION THERAPY | Facility: CLINIC | Age: 78
End: 2023-01-20

## 2023-01-20 LAB
ABO + RH PNL BLD: NORMAL
BLD GP AB SCN SERPL QL: NORMAL

## 2023-01-20 NOTE — RESULTS/DATA
[FreeTextEntry1] : ACC: [id number] EXAM:  DUPLEX EXT VEINS LOWER RT                      \par PROCEDURE DATE:  01/05/2023  \par \par INTERPRETATION:  Clinical History / Reason for exam: The patient is a \par 77-year-old female with lower extremity swelling on the right side. A \par venous duplex examination was performed to evaluate the patient for deep \par venous thrombosis of the lower extremities.\par \par The common femoral, greater saphenous, superficial femoral, popliteal and \par lesser saphenous veins were visualized in the right side with no evidence \par of deep venous thrombosis\par \par All veins were fully compressible.  There was presence of spontaneous \par flow, augmentation with distal compression and phasicity.\par \par The anterior tibial veins were  patent  posterior tibial veins were  \par patent  and peroneal veins were patent.\par \par Impression:\par No evidence of DVT or superficial thrombosis noted in the right lower \par extremity right mixed echogenic structure visualized in the right \par popliteal fossa measuring 6.2 x 3.5 x 1.9 cm\par \par \par ACC: [id number] EXAM:  XR TIB FIB AP LAT 2 VIEWS RT                      \par PROCEDURE DATE:  01/05/2023  \par \par INTERPRETATION:  Clinical History / Reason for exam: Trauma.\par \par Frontal and lateral views of the right tibia and fibula are submitted.\par There is no fracture or dislocation.  There are no bony or soft tissue \par abnormalities.\par \par Impression:\par Unremarkable right tibia and fibula\par \par \par ACC: [id number] EXAM:  XR KNEE COMP 4+ VIEWS RT                      \par PROCEDURE DATE:  01/05/2023  \par \par INTERPRETATION:  Clinical History / Reason for exam: Fall.\par \par Comparison: None.\par Procedure: 5 views of the right knee.\par \par Findings:\par There is no evidence of acute fracture or dislocation. Small joint \par effusion. Mild tricompartmental degenerative changes. No radiopaque \par foreign bodies.\par \par Impression:\par No acute osseous abnormality.\par Small joint effusion\par

## 2023-01-20 NOTE — PHYSICAL EXAM
[Normal] : affect appropriate [Ambulatory and capable of all self care but unable to carry out any work activities] : Status 2- Ambulatory and capable of all self care but unable to carry out any work activities. Up and about more than 50% of waking hours [de-identified] : On a wheelchair [de-identified] : Right knee pain

## 2023-01-20 NOTE — REVIEW OF SYSTEMS
[Recent Change In Weight] : ~T recent weight change [Negative] : Allergic/Immunologic [Fatigue] : fatigue [Shortness Of Breath] : no shortness of breath [Diarrhea] : no diarrhea [FreeTextEntry2] : weight loss 5 lbs in the last 4 months; pale in appearance [FreeTextEntry9] :  right knee pain

## 2023-01-20 NOTE — HISTORY OF PRESENT ILLNESS
[de-identified] : She missed on appointment for procrit last week.\par She starts her next cycle on 6/25/18\par C/o back pain radiating down her left which occurred when she bent to  something.\par No change in diarrhea.\par \par 7/31/18:\par Doing well. On Revlimid for more than 2yrs, 5 mg 2 weeks on 1 week off. She will be starting week on tonight. \par C/o diarrhea. On Imodium 2 pills AM and 2 pills PM. Doesn't help much with diarrhea. \par C/o nausea, abdominal pain. \par \par \par Colonoscopy in 2016 was normal. \par Did not have blood transfusion for over 2 years. \par On procrit [id number] weekly. Missed last week on 7/24/18 as she was out of town. She has been non compliant with weekly Procrit.\par Mammogram in 2017 was normal. \par \par 9/11/18\par pt is here for follow up.\par She feels the lomotil helps with the diarrhea.\par She was away for a few weeks and missed her procrit injections.\par No fever, abdominal  discomfort has been less since since use of lomotil.\par She started a new cycle 2 days ago.\par \par 10/2/18:\par She will start Revlimid tonight (week on). 2 weeks on, 1 week off. \par On ASA 81mg. \par Denies fever, nausea, vomiting, chest pain, SOB, abdominal pain, and bladder problems.\par C/o diarrhea. \par S/p 2 units PRBC transfusion on 9/25/18. \par On Procrit 26400 units weekly. Not compliant every week. \par C/o intermittent dizziness. \par \par 10/31/18\par Pt is here for follow up.\par C/O significant fatigue.\par Continues to have diarrhea, takes imodium and lomotil as needed.\par C/o dry cough, no fever.\par Cough started a month ago. It is worse in the mornings however over last week it has been constant all day.\par No chest pain.\par She was found to have low B12 levels and was started on B12 injections.\par \par 11/27/18:\par Doing well. Hospitalized for 3 days for cellulitis/abcess of the back s/p drainage and on Abx currently. Developed 3 days after Mg infusion as per pt. \par Denies nausea, vomiting, chest pain, SOB, abdominal pain, bowel and bladder problems.\par This is the week on Revlimid (week 1).\par S/p 1 unit PRBC transfusion in the hospital. \par On Procrit weekly \par \par 12/27/18:\par Doing well. No major complaints.\par Denies fever, nausea, vomiting, chest pain, SOB, abdominal pain, and bladder problems.\par On Revlimid 5 mg 2 weeks on 1 week off. This is the week off. She will start the week on sunday (12/30/18).\par Due for Procrit 83748 units today. \par Still has diarrhea. 4-5bm/day.\par On monthly B12 injections. \par \par 1/30/19:\par Patient here for follow up for MDS.  She is taking Revlimid 5 mg, 2 weeks on, 1 week off.  She will complete this current cycle on Saturday.  She has no new complaints today.  Patient denies cough, shortness of breath, denies fever, denies bone pain.\par \par 03/04/2019\par Patient is here for a follow up visit. She complaints of severe pain in her left shoulder and has had abscess drained 2 weeks ago. However the pain is worse and she has purulent discharge on examination. She also has Nasal sores that are painful. Culture from 11/2018 showed MRSA.  Denies Fever. \par She also complaints of swelling in her right leg. Not tender and not erythematous and negative Gong;s sign. \par Her diarrhea with Revlimid is ongoing and Imodium and Lomotil helps but not everyday. \par She is on 60,000 units of procrit weekly and Revlimid 5 mg 2 weeks on 1 week off. \par \par 4/23/19\par Pt is here for follow up.\par She feels well.\par The nasal sores have improved with bactroban\par The abscess on left shoulder has resolved.\par However she has a new one on the middle of her back.\par No fever.\par Pt has been on procrit 57145 units weekly, however she missed a dose in between.\par \par 5/8/19\par pt is here for follow up.\par no new complaints.\par feels well.\par Her skin abscess has resolved.\par she has been unable to go to ID, as she could not get an appt.\par No bleeding.\par She is compliant with revlimid.\par \par 6/6/19\par Pt is here for follow up.\par no new complaints \par Feels well.\par Is on week 2 of her Revlimid cycle. \par No transfusions since last visit\par \par 7/17/19\par Pt is here for follow up.\par C/O fatigue.\par Was away for a few days two weeks ago, but missed treatment with procrit for 2 weeks.\par Is complaint with Revlimid 2 weeks on 1 week off.\par Diarrhea is a little improved.\par \par 8/14/19\par Patient here for follow up visit, feeling well.  Although she is complaining of some pain at the left scapula area recently.  Patient denies cough, shortness of breath, denies fever, denies other bone pain.  She is off Revlimid  this week, due to restart on Monday.  She is scheduled for Procrit and Vitamin B12 injection today.  She plans to leave the country tomorrow to visit her mother who is ill.  She will return in about 10 days.\par \par 9/11/19\par Pt is here for follow up.\par She was away for 3 weeks, out of which she took procrit for 2 weeks in [place].\par She missed last week's dose.\par She had CBCs in [place] which showed Hgb of 8.9\par She feels well.\par She still getting diarrhea.\par She has been using lomotil with very minimal relief.\par She started a new cycle of Revlimid 4 days ago.\par \par 10/3/19\par Patient here for follow up visit, feeling fairly well.   Patient denies cough, shortness of breath, denies fever, denies other bone pain.  She remains on Revlimid.  She is scheduled for Procrit and Vitamin B12 injection today.\par \par 10/24/19\par Pt is here for follow up.\par Feels well.\par No SOB, fatigue.\par Compliant with procrit and Revl;imid.\par Remains on ASa.\par Diarrhea is unchanged.\par Pt takes imodium as needed.\par \par 11/14/19\par Pt is here for follow up.\par No new complaints.\par Starts a new cycle of Revlimid today.\par Diarrhea is same as before, no rectal bleeding.\par No fever.\par \par 12/5/19\par Pt is here for follow up.\par No new complaints.\par Denies feeling weaker than usual.\par No fever, SOB.\par No change in frequency of diarrhea.\par No pain.\par Will start next cycle of Revlimid in 3 days.\par \par \par !/16/20\par Pt was recently DCed from [facility] 3 days ago after being treated for pneumonia and MARCO.\par She is on po Levaquin.\par She restarted Revlmid 3 days ago.\par She is feeling better now. No fever.\par No SOB, Chest pain and back pain has resolved.\par Pt has a cough, no expectoration.\par She also recd a unit of PRBCs last week in the hospital\par \par \par 1/30/20\par Patient here for follow up visit, feeling well.  She has no new complaints. Cough is almost gone completely.  Patient denies shortness of breath, denies fever, denies bone pain.  Her appetite is ok, weight is stable.  She is due to restart Revlimid on Monday.\par \par 02/20/20\par Pt is here for follow up, feeling well.\par Offers no new complaints. Denies SOB, fever, chills, weight loss, CP, cough. \par Currently off week of Revlimid 5 mg. Restarts on Monday.\par Has procrit 80,000 units today. Next b 12 injection due in 2 weeks \par Did not get chest Xray\par CBC reviewed WBC 3.1, h/h 8.3/25%, plt 386, neutrophils 1.29\par \par 3/12/20\par Pt is here for follow up.\par She took Revlimid for 2 weeks and then has been off for one week although she was advised to take it daily without break.\par No SOB, Cough, fever.\par Has mild fatigue.\par \par 04/02/2020\par SIMONA KUHN a 75 year F is here today for follow up visit of MDS.\par Denies fever, chills, cough,night sweats, weight loss. \par Denies bleeding or bruising.\par Pt receives procrit 80,000 units weekly and B12 IM monthly. \par Continued Revlimid 5 mg daily x 3 weeks, has 1 dose left tonight. \par CBC reviewed: WBC 3.2, H/H 8.1/25.2, neutrophils 1.6, PLT 72\par \par 4/20/2020: The patient is here for follow up . She has no complaints of fever, chills, dizziness , chest pain and shortness of breath . She is still with diarrhea , up to 10 watery BMs per day, responds poorly to imodium and lomotil. She is on Revlimid 5 mg daily , took last dose yesterday night. Her last Procrit was on April 13. \par \par 5/26/20\par Pt is here for follow up.\par She is feeling well. Denies SOB, chest pain, fever.\par Continues to have diarrhea although she is off of Revlmid.\par Thinks it may due to diabetic meds.\par Wants to discuss BM bx results\par \par 6/22/20\par Pt is here for follow up.\par Feels well. Denies any fever, N, V, D\par Continues to have diarrhea, she will talk to her PCP about changing metformin for DM.\par Has been needing PRBCs 1 unit each month\par \par 7/20/20\par Pt is here for follow up.\par No new complaints. Has been feeling well.\par No SOB, CP. \par No N, V, D.\par She has recd 2 units of PRBCs since May 20.\par \par 8/17/20\par Pt is here for a follow up visit.\par She is feeling well.\par No new symptoms. No N, V, D.\par No bruising or bleeding. She continues to need PRBCs every 2-3 weeks.\par \par 8/31/20\par Pt is here for follow up. She is feeling well. No N, V, D.\par She is tolerating the hydrea well. No SOB, CP\par No bruising ior bleeding\par \par 9/8/20\par Pt is here for follow up.\par She had been contacted by the [facility] last week to advise her to stop the hydrea. Pt did not check her messages and continued with Hydrea.\par No fever, N, V, bleeding.\par Saw Dr Ferrell last week.\par \par 9/14/20\par Pt is here for folow up.\par Besides her usual complaint of diarrhea she is feeling well.\par Recd 1 unit PRBC last week.\par Has stopped Hydrea.\par No fever, mouth sores.\par \par 9/29/20\par Pt is here for folow up.\par No new complaints.\par Is seeing GI for diarrhea net week.\par No fever, night sweats.\par REcd 3 units of PRBC this month\par \par 10/13/20\par Pt is here for follow up.\par No new complaints.\par Has not needed a transfusion since 3 weeks ago.\par Continues to have diarrhea, waiting to be seen by GI\par \par 10/26/20\par Pt is here for follow up.\par C/O feeling fatigued.\par Has CLARK.\par No nausea or vomiting.\par No fever.\par Diarrhea has improved a little. She is no longer on Metformin\par \par 11/9/20\par Pt is here for follow up.\par Feels well. Denies SOB, CP, fatigue\par \par 12/7/20- Patient is for follow up and is due for cycle 9 of Vidaza.  She still has loose BM sometimes 10 times a day and was seeing Dr. Corey from GI- did not follow up recently and is unable to get an appt with him. She has lost about 10 lbs since September. No N/V. No fever or chills. No CP/SOB. She has been getting PRBC transfusion every 3 weeks now\par \par 01/04/20 Pt presented today for f/u visit . She is s/p 8 cycles of vidaza and was started on Luspatercept in Dec , 2020 . So far she received 1 injection , she is due for 2nd injection today . Adverse effect profile was discussed with her in detail , she reports having some dizziness and weakness after first injection . She received blood transfusion last wk . Blood work from today reviewed as well and counts are adequate . She denies any fevers,  chills,  or any new symptoms . \par \par 1/25/21\par Pt is here for follow up.\par C/O diarrhea, otherwise feeling better.\par Has not needed a transfusion since 12/29\par \par 2/16/21\par Pt is here for follow up.\par Needed transfusion on 2/8/21.\par Continues to C/o fatigue. Diarrhea remains the same, has not made GI appt as yet.\par \par 3/8/21\par Pt is here for follow up.\par Feels better today. Recd 1 unit PRBCs last week.\par Diarrhea is same as before. has an appt with GI next week.\par No fever\par \par 3/30/21\par Pt is here for follow up.\par Feels better. Last transfusion was on 3/2/21\par Saw GI and was started on cholestyramine and metformin was DCED with resolution of diarrhea.\par She denies any SOB,\par Fatigue is better\par \par 4/20/2021\par Pt is here to f/u for MDS\par She is s/p Luspatercept cycle #6\par She is compliant with Aspirin\par She feels better today, appetite is good\par She denies shortness of breath, fatigue, or weakness \par She c/o of diarrhea but it has improved since she was started on Cholestyramine. Stool is soft and less in frequency\par She received COVID vaccine x 2 doses\par \par 5/11/21\par pt is here for follow up.\par Feels the same with fatigue, diarrhea.\par No SOB\par \par 6/21/21\par Pt is here for follow up.\par Feels well. No SOB, CP, N< V.\par Diarrhea is better controlled now.\par Last PRBC transfusion was 2 weeks ago.\par \par 7/19/21\par Pt is here for follow up.\par Feels a little tired, last transfusion was 6/1/21.\par No bleeding, fever, SOB\par \par 8/10/2021\par Patient is here to follow up for her MDS.\par She is on Luspatercept, tolerating well.\par She feels well today, the appetite is good.\par She denies shortness of breath, fatigue, fever, night sweats, abdominal pain, arthralgias/myalgias.\par \par 8/31/21\par Pt is here for follow up.\par C/O feeling very fatigued.\par No bleeding.\par No fever.\par \par 9/21/2021\par Patient is here to follow up for MDS.\par She is on Luspatercept and Retacrit, tolerating well.\par She gets blood transfusion as needed for Hgb <7 gm/dL\par She is c/o of fatigue, no shortness of breath, dizziness, chills or lightheadedness.\par She denies melena or hematochezia.\par Her appetite is good, no nausea/vomiting.\par \par 10/28/2021\par Patient is here to follow up for MDS.\par She is on Luspatercept and Retacrit, tolerating well.\par She gets blood transfusion to keep Hgb > 7 gm/dL.\par She is c/o of chronic fatigue, no shortness of breath, dizziness, chills or lightheadedness.\par She denies melena or hematochezia.\par Her appetite is good, no nausea/vomiting.\par She c/o of severe diarrhea, 10-12x/day watery stool while on Imodium in the last 2 weeks.\par Last colonoscopy was in 2016, benign as per patient.\par \par 11/18/21\par Pt is here for follow up. C/O fatigue. No SOB, CP\par \par 12/9/21\par Pt is here for follow up.\par Feels better today. Recd 2 units last week in ER.\par Planning to go to [place] for over a week and does not want to miss her procrit dose.\par Diarrhea is stable,\par \par 12/23/21\par Pt is here for follow up.\par Feels better. No SOB, CP. Going to [place] tomorrow. Has not been able to get Procrit injection from the pharmacy yet d/t insurance issues\par \par 2/3/22\par Pt is here for follow up. Feeling same as usual. No SOB, CP. Last transfusion was 2 weeks ago in ER>\par No bleeding reported\par \par 3/3/22\par Pt is here for follow up.\par C/O fatigue. Had black stools X2\par \par 3/30/22\par Pt is feeling well.\par Here for treatment and BM biopsy\par \par 4/21/22\par Pt is here fir follow up.\par Was in ER last week. Was give 2 units of PRBCs.\par Pt feels less tired today.\par Wants to discuss BM rslts\par \par 5/12/22\par Pt is here today for follow up visit.\par Pt c/o feeling tired.  Hgb=6.9.  One unit transfusion scheduled.\par \par 6/23/22\par Pt is here for follow up. Feels well today. No SOB, chest pain.\par Has not made appt with [facility]\par \par 7/14/22\par Pt is here for follow up for lowrisk myelodysplastic syndrome.\par She came in late today because she was not feeling well this morning- dizziness & weakness.\par She denies SOB, CP.\par \par 9/22/22\par Pt is here for follow up.\par C/O fatigue. was in the ER 3 weeks ago and recd 2 U PRBCS\par \par 11/10/22\par Patient is here to follow up for MDS/MPN.\par S/P 2 units pRBC last weekend, feeling much better now.\par She denies shortness of breath, chest pain or headache.\par \par 1/5/2023\par Patient is here to follow up for MDS/MPN, accompanied by spouse and daughter.\par She is feeling much better after she received blood transfusion on 12/29/22.\par She denies shortness of breath, chest pain, dizziness or headache.\par Pt fell on 12/28/22 when she slipped while getting down the stairs and landed on her right knee. Now c/o of severe right knee pain with limited ROM. She has not been evaluated since the incident happened. She is taking Aleeve with minimal relief.\par \par 1/19/2023\par Patient is here to follow up for MDS/MPN and treatment.\par She feel fine, has fatigue not worse than her baseline.\par She denies shortness of breath, chest pain, dizziness, headache or bleeding.\par She was evaluated at the ER on 1/5/2023 for her fall and imaging showed no fracture, except for mild joint effusion. [de-identified] : This is a 75 year old  female with history of MDS. She's was previously treated with Revlimid 5 mg, 2 weeks on, 1 week off.  \par She is also on Procrit.\par  She underwent colonoscopy (2/2017)  Results noted no colitis, only hyperplastic polyp noted. \par  \par

## 2023-01-23 ENCOUNTER — NON-APPOINTMENT (OUTPATIENT)
Age: 78
End: 2023-01-23

## 2023-01-25 ENCOUNTER — NON-APPOINTMENT (OUTPATIENT)
Age: 78
End: 2023-01-25

## 2023-01-26 ENCOUNTER — NON-APPOINTMENT (OUTPATIENT)
Age: 78
End: 2023-01-26

## 2023-01-26 ENCOUNTER — APPOINTMENT (OUTPATIENT)
Dept: INFUSION THERAPY | Facility: CLINIC | Age: 78
End: 2023-01-26

## 2023-01-30 ENCOUNTER — NON-APPOINTMENT (OUTPATIENT)
Age: 78
End: 2023-01-30

## 2023-02-02 ENCOUNTER — APPOINTMENT (OUTPATIENT)
Dept: INFUSION THERAPY | Facility: CLINIC | Age: 78
End: 2023-02-02

## 2023-02-02 ENCOUNTER — NON-APPOINTMENT (OUTPATIENT)
Age: 78
End: 2023-02-02

## 2023-02-02 ENCOUNTER — LABORATORY RESULT (OUTPATIENT)
Age: 78
End: 2023-02-02

## 2023-02-02 LAB
HCT VFR BLD CALC: 19.2 %
HGB BLD-MCNC: 6.3 G/DL
MCHC RBC-ENTMCNC: 29 PG
MCHC RBC-ENTMCNC: 32.8 G/DL
MCV RBC AUTO: 88.5 FL
PLATELET # BLD AUTO: 120 K/UL
PMV BLD: 12.7 FL
RBC # BLD: 2.17 M/UL
RBC # FLD: 18.5 %
WBC # FLD AUTO: 4.9 K/UL

## 2023-02-02 RX ORDER — PREGABALIN 225 MG/1
1000 CAPSULE ORAL ONCE
Refills: 0 | Status: COMPLETED | OUTPATIENT
Start: 2023-02-02 | End: 2023-02-02

## 2023-02-02 RX ORDER — ERYTHROPOIETIN 10000 [IU]/ML
80000 INJECTION, SOLUTION INTRAVENOUS; SUBCUTANEOUS ONCE
Refills: 0 | Status: COMPLETED | OUTPATIENT
Start: 2023-02-02 | End: 2023-02-02

## 2023-02-02 RX ADMIN — ERYTHROPOIETIN 80000 UNIT(S): 10000 INJECTION, SOLUTION INTRAVENOUS; SUBCUTANEOUS at 16:34

## 2023-02-02 RX ADMIN — PREGABALIN 1000 MICROGRAM(S): 225 CAPSULE ORAL at 16:33

## 2023-02-03 ENCOUNTER — EMERGENCY (EMERGENCY)
Facility: HOSPITAL | Age: 78
LOS: 0 days | Discharge: HOME | End: 2023-02-03
Attending: EMERGENCY MEDICINE | Admitting: EMERGENCY MEDICINE
Payer: MEDICARE

## 2023-02-03 ENCOUNTER — NON-APPOINTMENT (OUTPATIENT)
Age: 78
End: 2023-02-03

## 2023-02-03 ENCOUNTER — OUTPATIENT (OUTPATIENT)
Dept: OUTPATIENT SERVICES | Facility: HOSPITAL | Age: 78
LOS: 1 days | End: 2023-02-03

## 2023-02-03 ENCOUNTER — APPOINTMENT (OUTPATIENT)
Dept: INFUSION THERAPY | Facility: CLINIC | Age: 78
End: 2023-02-03

## 2023-02-03 VITALS
DIASTOLIC BLOOD PRESSURE: 75 MMHG | HEART RATE: 82 BPM | SYSTOLIC BLOOD PRESSURE: 174 MMHG | OXYGEN SATURATION: 100 % | RESPIRATION RATE: 18 BRPM | TEMPERATURE: 98 F

## 2023-02-03 VITALS
HEART RATE: 79 BPM | DIASTOLIC BLOOD PRESSURE: 81 MMHG | RESPIRATION RATE: 20 BRPM | HEIGHT: 62 IN | SYSTOLIC BLOOD PRESSURE: 195 MMHG | WEIGHT: 138.89 LBS | TEMPERATURE: 98 F | OXYGEN SATURATION: 100 %

## 2023-02-03 DIAGNOSIS — Z20.822 CONTACT WITH AND (SUSPECTED) EXPOSURE TO COVID-19: ICD-10-CM

## 2023-02-03 DIAGNOSIS — D69.6 THROMBOCYTOPENIA, UNSPECIFIED: ICD-10-CM

## 2023-02-03 DIAGNOSIS — D46.9 MYELODYSPLASTIC SYNDROME, UNSPECIFIED: ICD-10-CM

## 2023-02-03 DIAGNOSIS — Z51.11 ENCOUNTER FOR ANTINEOPLASTIC CHEMOTHERAPY: ICD-10-CM

## 2023-02-03 DIAGNOSIS — Z86.2 PERSONAL HISTORY OF DISEASES OF THE BLOOD AND BLOOD-FORMING ORGANS AND CERTAIN DISORDERS INVOLVING THE IMMUNE MECHANISM: ICD-10-CM

## 2023-02-03 DIAGNOSIS — D64.9 ANEMIA, UNSPECIFIED: ICD-10-CM

## 2023-02-03 DIAGNOSIS — E11.22 TYPE 2 DIABETES MELLITUS WITH DIABETIC CHRONIC KIDNEY DISEASE: ICD-10-CM

## 2023-02-03 DIAGNOSIS — N18.9 CHRONIC KIDNEY DISEASE, UNSPECIFIED: ICD-10-CM

## 2023-02-03 DIAGNOSIS — Z79.84 LONG TERM (CURRENT) USE OF ORAL HYPOGLYCEMIC DRUGS: ICD-10-CM

## 2023-02-03 LAB
ALBUMIN SERPL ELPH-MCNC: 4.1 G/DL — SIGNIFICANT CHANGE UP (ref 3.5–5.2)
ALP SERPL-CCNC: 175 U/L — HIGH (ref 30–115)
ALT FLD-CCNC: 32 U/L — SIGNIFICANT CHANGE UP (ref 0–41)
ANION GAP SERPL CALC-SCNC: 10 MMOL/L — SIGNIFICANT CHANGE UP (ref 7–14)
APPEARANCE UR: CLEAR — SIGNIFICANT CHANGE UP
AST SERPL-CCNC: 25 U/L — SIGNIFICANT CHANGE UP (ref 0–41)
B-OH-BUTYR SERPL-SCNC: <0.2 MMOL/L — SIGNIFICANT CHANGE UP
BACTERIA # UR AUTO: NEGATIVE — SIGNIFICANT CHANGE UP
BASE EXCESS BLDV CALC-SCNC: -4.2 MMOL/L — LOW (ref -2–3)
BASOPHILS # BLD AUTO: 0.05 K/UL — SIGNIFICANT CHANGE UP (ref 0–0.2)
BASOPHILS NFR BLD AUTO: 1.1 % — HIGH (ref 0–1)
BILIRUB SERPL-MCNC: 1.2 MG/DL — SIGNIFICANT CHANGE UP (ref 0.2–1.2)
BILIRUB UR-MCNC: NEGATIVE — SIGNIFICANT CHANGE UP
BLD GP AB SCN SERPL QL: SIGNIFICANT CHANGE UP
BUN SERPL-MCNC: 28 MG/DL — HIGH (ref 10–20)
CA-I SERPL-SCNC: 1.32 MMOL/L — SIGNIFICANT CHANGE UP (ref 1.15–1.33)
CALCIUM SERPL-MCNC: 9.3 MG/DL — SIGNIFICANT CHANGE UP (ref 8.4–10.5)
CHLORIDE SERPL-SCNC: 96 MMOL/L — LOW (ref 98–110)
CO2 SERPL-SCNC: 23 MMOL/L — SIGNIFICANT CHANGE UP (ref 17–32)
COLOR SPEC: SIGNIFICANT CHANGE UP
CREAT SERPL-MCNC: 1.2 MG/DL — SIGNIFICANT CHANGE UP (ref 0.7–1.5)
DIFF PNL FLD: NEGATIVE — SIGNIFICANT CHANGE UP
EGFR: 47 ML/MIN/1.73M2 — LOW
EOSINOPHIL # BLD AUTO: 0.04 K/UL — SIGNIFICANT CHANGE UP (ref 0–0.7)
EOSINOPHIL NFR BLD AUTO: 0.9 % — SIGNIFICANT CHANGE UP (ref 0–8)
EPI CELLS # UR: 4 /HPF — SIGNIFICANT CHANGE UP (ref 0–5)
GAS PNL BLDV: 129 MMOL/L — LOW (ref 136–145)
GAS PNL BLDV: SIGNIFICANT CHANGE UP
GAS PNL BLDV: SIGNIFICANT CHANGE UP
GLUCOSE SERPL-MCNC: 355 MG/DL — HIGH (ref 70–99)
GLUCOSE UR QL: ABNORMAL
HCO3 BLDV-SCNC: 22 MMOL/L — SIGNIFICANT CHANGE UP (ref 22–29)
HCT VFR BLD CALC: 22.4 % — LOW (ref 37–47)
HCT VFR BLDA CALC: 23 % — LOW (ref 39–51)
HGB BLD CALC-MCNC: 7.6 G/DL — LOW (ref 12.6–17.4)
HGB BLD-MCNC: 7.4 G/DL — LOW (ref 12–16)
HYALINE CASTS # UR AUTO: 2 /LPF — SIGNIFICANT CHANGE UP (ref 0–7)
IMM GRANULOCYTES NFR BLD AUTO: 1.5 % — HIGH (ref 0.1–0.3)
KETONES UR-MCNC: NEGATIVE — SIGNIFICANT CHANGE UP
LACTATE BLDV-MCNC: 3.5 MMOL/L — HIGH (ref 0.5–2)
LACTATE SERPL-SCNC: 3.8 MMOL/L — HIGH (ref 0.7–2)
LEUKOCYTE ESTERASE UR-ACNC: NEGATIVE — SIGNIFICANT CHANGE UP
LYMPHOCYTES # BLD AUTO: 0.95 K/UL — LOW (ref 1.2–3.4)
LYMPHOCYTES # BLD AUTO: 20.4 % — LOW (ref 20.5–51.1)
MAGNESIUM SERPL-MCNC: 1.8 MG/DL — SIGNIFICANT CHANGE UP (ref 1.8–2.4)
MCHC RBC-ENTMCNC: 28.5 PG — SIGNIFICANT CHANGE UP (ref 27–31)
MCHC RBC-ENTMCNC: 33 G/DL — SIGNIFICANT CHANGE UP (ref 32–37)
MCV RBC AUTO: 86.2 FL — SIGNIFICANT CHANGE UP (ref 81–99)
MONOCYTES # BLD AUTO: 0.67 K/UL — HIGH (ref 0.1–0.6)
MONOCYTES NFR BLD AUTO: 14.4 % — HIGH (ref 1.7–9.3)
NEUTROPHILS # BLD AUTO: 2.87 K/UL — SIGNIFICANT CHANGE UP (ref 1.4–6.5)
NEUTROPHILS NFR BLD AUTO: 61.7 % — SIGNIFICANT CHANGE UP (ref 42.2–75.2)
NITRITE UR-MCNC: NEGATIVE — SIGNIFICANT CHANGE UP
NRBC # BLD: 0 /100 WBCS — SIGNIFICANT CHANGE UP (ref 0–0)
PCO2 BLDV: 49 MMHG — HIGH (ref 39–42)
PH BLDV: 7.27 — LOW (ref 7.32–7.43)
PH UR: 6.5 — SIGNIFICANT CHANGE UP (ref 5–8)
PHOSPHATE SERPL-MCNC: 3.6 MG/DL — SIGNIFICANT CHANGE UP (ref 2.1–4.9)
PLATELET # BLD AUTO: 325 K/UL — SIGNIFICANT CHANGE UP (ref 130–400)
PO2 BLDV: 40 MMHG — SIGNIFICANT CHANGE UP
POTASSIUM BLDV-SCNC: 5.7 MMOL/L — HIGH (ref 3.5–5.1)
POTASSIUM SERPL-MCNC: 5.8 MMOL/L — HIGH (ref 3.5–5)
POTASSIUM SERPL-SCNC: 5.8 MMOL/L — HIGH (ref 3.5–5)
PROT SERPL-MCNC: 5.9 G/DL — LOW (ref 6–8)
PROT UR-MCNC: ABNORMAL
RBC # BLD: 2.6 M/UL — LOW (ref 4.2–5.4)
RBC # FLD: 17.2 % — HIGH (ref 11.5–14.5)
RBC CASTS # UR COMP ASSIST: 0 /HPF — SIGNIFICANT CHANGE UP (ref 0–4)
SAO2 % BLDV: 66.5 % — SIGNIFICANT CHANGE UP
SARS-COV-2 RNA SPEC QL NAA+PROBE: SIGNIFICANT CHANGE UP
SODIUM SERPL-SCNC: 129 MMOL/L — LOW (ref 135–146)
SP GR SPEC: 1.01 — SIGNIFICANT CHANGE UP (ref 1.01–1.03)
UROBILINOGEN FLD QL: SIGNIFICANT CHANGE UP
WBC # BLD: 4.65 K/UL — LOW (ref 4.8–10.8)
WBC # FLD AUTO: 4.65 K/UL — LOW (ref 4.8–10.8)
WBC UR QL: 1 /HPF — SIGNIFICANT CHANGE UP (ref 0–5)

## 2023-02-03 PROCEDURE — 93010 ELECTROCARDIOGRAM REPORT: CPT

## 2023-02-03 PROCEDURE — 99285 EMERGENCY DEPT VISIT HI MDM: CPT

## 2023-02-03 PROCEDURE — 71045 X-RAY EXAM CHEST 1 VIEW: CPT | Mod: 26

## 2023-02-03 RX ORDER — SODIUM CHLORIDE 9 MG/ML
1000 INJECTION, SOLUTION INTRAVENOUS ONCE
Refills: 0 | Status: COMPLETED | OUTPATIENT
Start: 2023-02-03 | End: 2023-02-03

## 2023-02-03 RX ADMIN — SODIUM CHLORIDE 1000 MILLILITER(S): 9 INJECTION, SOLUTION INTRAVENOUS at 16:35

## 2023-02-03 NOTE — ED PROVIDER NOTE - CARE PLAN
1 Principal Discharge DX:	Abnormal blood chemistry level  Secondary Diagnosis:	MDS (myelodysplastic syndrome)

## 2023-02-03 NOTE — ED PROVIDER NOTE - OBJECTIVE STATEMENT
77 F hx of MDS, DM presents to ED abnormal lab result. pt was sent in from hematology office for elevated potassium, glucose and low Hgb. pt was seen by Dr Wade this morning for blood transfusion. pt denies any symptoms of pain but sts for the past week she has been feeling more fatigued. denies N,V,D,HA,belly pain, fever, chills, CP, SOB, blurry vision, bloody stools. pt sts she still makes urine

## 2023-02-03 NOTE — ED PROVIDER NOTE - PHYSICAL EXAMINATION
VITAL SIGNS: I have reviewed nursing notes and confirm.  CONSTITUTIONAL:  in no acute distress.  SKIN: Skin exam is warm and dry, no acute rash. skin appears pale   HEAD: Normocephalic; atraumatic.  EYES:  EOM intact; conjunctiva pale and sclera clear.  ENT: No nasal discharge; airway clear.  NECK: Supple; non tender.  CARD: S1, S2 normal; no murmurs, gallops, or rubs. Regular rate and rhythm.  RESP: No wheezes, rales or rhonchi. Speaking in full sentences.   ABD: Normal bowel sounds; soft; non-distended; non-tender; No rebound or guarding. No CVA tenderness.  EXT: Normal ROM. No clubbing, cyanosis or edema.

## 2023-02-03 NOTE — ED PROVIDER NOTE - CLINICAL SUMMARY MEDICAL DECISION MAKING FREE TEXT BOX
76yo F history of MDS DM CKD presenting for eval of generalized weakness. Had labs done op showing anemia, hyperglycemia, and hyperkalemia. Received 1U PRBC in office with Dr. Wade, sent in for further eval. Denies all bleeding. Denies hemoptysis, hematemesis, hematuria, BRBPR, melena, vaginal bleeding. No chest pain, shortness of breath. chronically ill appearing, NAD, non toxic. NCAT PERRLA pale conjunctiva EOMI neck supple non tender normal wob cta bl rrr abdomen s nt nd no rebound no guarding WWPx4 neuro non focal. pt hyperglycemic, non dka. states she feels weak, requesting 1U PRBC and then discharge. K noted, no EKG changes, has been at similar levels prior. sp transfusion, pt feeling significantly improved, requesting discharge. Aware of all results, given a copy of all available results, comfortable with discharge and follow-up outpatient, strict return precautions given. Endorses understanding of all of this and aware that they can return at any time for new or concerning symptoms. No further questions or concerns at this time

## 2023-02-03 NOTE — ED PROVIDER NOTE - ATTENDING APP SHARED VISIT CONTRIBUTION OF CARE
76yo F history of MDS DM CKD presenting for eval of generalized weakness. Had labs done op showing anemia, hyperglycemia, and hyperkalemia. Received 1U PRBC in office with Dr. Wade, sent in for further eval. Denies all bleeding. Denies hemoptysis, hematemesis, hematuria, BRBPR, melena, vaginal bleeding. No chest pain, shortness of breath. chronically ill appearing, NAD, non toxic. NCAT PERRLA pale conjunctiva EOMI neck supple non tender normal wob cta bl rrr abdomen s nt nd no rebound no guarding WWPx4 neuro non focal.

## 2023-02-03 NOTE — ED ADULT NURSE NOTE - NSFALLRSKPASTHIST_ED_ALL_ED
Impression: Exdtve age-rel mclr degn, right eye, with actv chrdl neovas: H35.2891. OD. Plan: OCT ordered and performed today. Discussed diagnosis in detail with patient. Discussed treatment options with patient. Discussed risks and benefits and patient understands. Recommend Eylea today in the right eye. Patient will receive a round of two injections of eylea, #2/2 in 1 month in the right eye then DE/OCT. Patient elects and agrees with plan.
Impression: Nexdtve age-related mclr degn, left eye, intermed dry stage: H35.3122. Plan: Discussed diagnosis with patient, OCT demonstrates the lack of SRF or CME. Treatment is not recommended at this time but we will continue to monitor frequently. The patient was advised to continue AREDS multi-vitamins, monitor the amsler grid closely, monitor vision one eye at a time. Call immediately with any vision changes. Patient agrees with plan.
no

## 2023-02-03 NOTE — ED PROVIDER NOTE - PATIENT PORTAL LINK FT
You can access the FollowMyHealth Patient Portal offered by [facility] by registering at the following website: [email or web address]. By joining [employer]’s FollowMyHealth portal, you will also be able to view your health information using other applications (apps) compatible with our system.

## 2023-02-03 NOTE — ED PROVIDER NOTE - NSFOLLOWUPINSTRUCTIONS_ED_ALL_ED_FT
Please follow up with nephrology in the next 1-3 days     ********* Our Emergency Department Referral Coordinators will be reaching out to you in the next 24-48 hours from 9:00am to 5:00pm with a follow up appointment. Please expect a phone call from the hospital in that time frame. If you do not receive a call or if you have any questions or concerns, you can reach them at [phone] or [phone].     Hyperglycemia  Hyperglycemia occurs when the level of sugar (glucose) in the blood is too high. Glucose is a type of sugar that provides the body's main source of energy. Certain hormones (insulin and glucagon) control the level of glucose in the blood. Insulin lowers blood glucose, and glucagon increases blood glucose. Hyperglycemia can result from having too little insulin in the bloodstream, or from the body not responding normally to insulin.    Hyperglycemia occurs most often in people who have diabetes (diabetes mellitus), but it can happen in people who do not have diabetes. It can develop quickly, and it can be life-threatening if it causes you to become severely dehydrated (diabetic ketoacidosis or hyperglycemic hyperosmolar state). Severe hyperglycemia is a medical emergency.    What are the causes?  If you have diabetes, hyperglycemia may be caused by:    Diabetes medicine.  Medicines that increase blood glucose or affect your diabetes control.  Not eating enough, or not eating often enough.  Changes in physical activity level.  Being sick or having an infection.    If you have prediabetes or undiagnosed diabetes:    Hyperglycemia may be caused by those conditions.    If you do not have diabetes, hyperglycemia may be caused by:    Certain medicines, including steroid medicines, beta-blockers, epinephrine, and thiazide diuretics.  Stress.  Serious illness.  Surgery.  Diseases of the pancreas.  Infection.    What increases the risk?  Hyperglycemia is more likely to develop in people who have risk factors for diabetes, such as:    Having a family member with diabetes.  Having a gene for type 1 diabetes that is passed from parent to child (inherited).  Living in an area with cold weather conditions.  Exposure to certain viruses.  Certain conditions in which the body's disease-fighting (immune) system attacks itself (autoimmune disorders).  Being overweight or obese.  Having an inactive (sedentary) lifestyle.  Having been diagnosed with insulin resistance.  Having a history of prediabetes, gestational diabetes, or polycystic ovarian syndrome (PCOS).  Being of American-, -American, /, or / descent.    What are the signs or symptoms?  Hyperglycemia may not cause any symptoms. If you do have symptoms, they may include early warning signs, such as:    Increased thirst.  Hunger.  Feeling very tired.  Needing to urinate more often than usual.  Blurry vision.    Other symptoms may develop if hyperglycemia gets worse, such as:    Dry mouth.  Loss of appetite.  Fruity-smelling breath.  Weakness.  Unexpected or rapid weight gain or weight loss.  Tingling or numbness in the hands or feet.  Headache.  Skin that does not quickly return to normal after being lightly pinched and released (poor skin turgor).  Abdominal pain.  Cuts or bruises that are slow to heal.    How is this diagnosed?  Hyperglycemia is diagnosed with a blood test to measure your blood glucose level. This blood test is usually done while you are having symptoms. Your health care provider may also do a physical exam and review your medical history.    You may have more tests to determine the cause of your hyperglycemia, such as:    A fasting blood glucose (FBG) test. You will not be allowed to eat (you will fast) for at least 8 hours before a blood sample is taken.  An A1c (hemoglobin A1c) blood test. This provides information about blood glucose control over the previous 2–3 months.  An oral glucose tolerance test (OGTT). This measures your blood glucose at two times:    After fasting. This is your baseline blood glucose level.  Two hours after drinking a beverage that contains glucose.      How is this treated?  Treatment depends on the cause of your hyperglycemia. Treatment may include:    Taking medicine to regulate your blood glucose levels. If you take insulin or other diabetes medicines, your medicine or dosage may be adjusted.  Lifestyle changes, such as exercising more, eating healthier foods, or losing weight.  Treating an illness or infection, if this caused your hyperglycemia.  Checking your blood glucose more often.  Stopping or reducing steroid medicines, if these caused your hyperglycemia.    If your hyperglycemia becomes severe and it results in hyperglycemic hyperosmolar state, you must be hospitalized and given IV fluids.    Follow these instructions at home:  General instructions     Take over-the-counter and prescription medicines only as told by your health care provider.  Do not use any products that contain nicotine or tobacco, such as cigarettes and e-cigarettes. If you need help quitting, ask your health care provider.  Limit alcohol intake to no more than 1 drink per day for nonpregnant women and 2 drinks per day for men. One drink equals 12 oz of beer, 5 oz of wine, or 1½ oz of hard liquor.  Learn to manage stress. If you need help with this, ask your health care provider.  Keep all follow-up visits as told by your health care provider. This is important.  Eating and drinking     Maintain a healthy weight.  Exercise regularly, as directed by your health care provider.  Stay hydrated, especially when you exercise, get sick, or spend time in hot temperatures.  Eat healthy foods, such as:    Lean proteins.  Complex carbohydrates.  Fresh fruits and vegetables.  Low-fat dairy products.  Healthy fats.    ImageDrink enough fluid to keep your urine clear or pale yellow.  If you have diabetes:     Image   Make sure you know the symptoms of hyperglycemia.  Follow your diabetes management plan, as told by your health care provider. Make sure you:    Take your insulin and medicines as directed.  Follow your exercise plan.  Follow your meal plan. Eat on time, and do not skip meals.  Check your blood glucose as often as directed. Make sure to check your blood glucose before and after exercise. If you exercise longer or in a different way than usual, check your blood glucose more often.  Follow your sick day plan whenever you cannot eat or drink normally. Make this plan in advance with your health care provider.    Share your diabetes management plan with people in your workplace, school, and household.  Check your urine for ketones when you are ill and as told by your health care provider.  Carry a medical alert card or wear medical alert jewelry.  Contact a health care provider if:  Your blood glucose is at or above 240 mg/dL (13.3 mmol/L) for 2 days in a row.  You have problems keeping your blood glucose in your target range.  You have frequent episodes of hyperglycemia.  Get help right away if:  You have difficulty breathing.  You have a change in how you think, feel, or act (mental status).  You have nausea or vomiting that does not go away.  These symptoms may represent a serious problem that is an emergency. Do not wait to see if the symptoms will go away. Get medical help right away. Call your local emergency services (911 in the [place] Do not drive yourself to the hospital.     Summary  Hyperglycemia occurs when the level of sugar (glucose) in the blood is too high.  Hyperglycemia is diagnosed with a blood test to measure your blood glucose level. This blood test is usually done while you are having symptoms. Your health care provider may also do a physical exam and review your medical history.  If you have diabetes, follow your diabetes management plan as told by your health care provider.  Contact your health care provider if you have problems keeping your blood glucose in your target range.  This information is not intended to replace advice given to you by your health care provider. Make sure you discuss any questions you have with your health care provider.

## 2023-02-03 NOTE — ED PROVIDER NOTE - INPATIENT RECORD SUMMARY
For wound care site under eye: Cleanse AROUND skin graft site daily with hydrogen peroxide and apply Polysporin ointment AROUND skin graft site.        In 10 days: (for shoulder)   -Start massaging incision site with Vitamin E 3 times daily for 10 minutes each time.  Vitamin E capsule or liquid can be purchased over-the-counter in the vitamin section.  Puncture hole in capsule and massage area.     -Use Vitamin E treatments for 6 weeks     -Direct sunlight should be avoided while using as it can turn incision purple.  Use 50+ sunscreen and a hat if you are going to be in the sun.     -Follow up with Dr. Pitt as directed     -Call office with any problems or questions.      
pmh

## 2023-02-03 NOTE — ED ADULT NURSE NOTE - OBJECTIVE STATEMENT
76 y/o female sent to ED for eval of abnormal lab results. pt reportedly had high potassium, blood sugar and low hemoglobin

## 2023-02-03 NOTE — ED ADULT NURSE NOTE - NSIMPLEMENTINTERV_GEN_ALL_ED
Implemented All Fall with Harm Risk Interventions:  [place] to call system. Call bell, personal items and telephone within reach. Instruct patient to call for assistance. Room bathroom lighting operational. Non-slip footwear when patient is off stretcher. Physically safe environment: no spills, clutter or unnecessary equipment. Stretcher in lowest position, wheels locked, appropriate side rails in place. Provide visual cue, wrist band, yellow gown, etc. Monitor gait and stability. Monitor for mental status changes and reorient to person, place, and time. Review medications for side effects contributing to fall risk. Reinforce activity limits and safety measures with patient and family. Provide visual clues: red socks.

## 2023-02-03 NOTE — ED PROVIDER NOTE - PROGRESS NOTE DETAILS
pt hyperglycemic, non dka. states she feels weak, requesting 1U PRBC and then discharge. K noted, no EKG changes, has been at similar levels prior.

## 2023-02-06 LAB
ABO + RH PNL BLD: NORMAL
ALBUMIN SERPL ELPH-MCNC: 3.9 G/DL
ALBUMIN SERPL ELPH-MCNC: 4.1 G/DL
ALP BLD-CCNC: 173 U/L
ALP BLD-CCNC: 177 U/L
ALT SERPL-CCNC: 28 U/L
ALT SERPL-CCNC: 34 U/L
ANION GAP SERPL CALC-SCNC: 11 MMOL/L
ANION GAP SERPL CALC-SCNC: 12 MMOL/L
APTT BLD: 31.6 SEC
AST SERPL-CCNC: 23 U/L
AST SERPL-CCNC: 27 U/L
BILIRUB SERPL-MCNC: 0.6 MG/DL
BILIRUB SERPL-MCNC: 0.6 MG/DL
BLD GP AB SCN SERPL QL: NORMAL
BUN SERPL-MCNC: 30 MG/DL
BUN SERPL-MCNC: 30 MG/DL
CALCIUM SERPL-MCNC: 8.9 MG/DL
CALCIUM SERPL-MCNC: 9.1 MG/DL
CHLORIDE SERPL-SCNC: 94 MMOL/L
CHLORIDE SERPL-SCNC: 95 MMOL/L
CO2 SERPL-SCNC: 22 MMOL/L
CO2 SERPL-SCNC: 24 MMOL/L
CREAT SERPL-MCNC: 1.4 MG/DL
CREAT SERPL-MCNC: 1.5 MG/DL
EGFR: 36 ML/MIN/1.73M2
EGFR: 39 ML/MIN/1.73M2
GLUCOSE SERPL-MCNC: 573 MG/DL
GLUCOSE SERPL-MCNC: 576 MG/DL
INR PPP: 1.11 RATIO
POTASSIUM SERPL-SCNC: 5.6 MMOL/L
POTASSIUM SERPL-SCNC: 6.1 MMOL/L
PROT SERPL-MCNC: 5.7 G/DL
PROT SERPL-MCNC: 6 G/DL
PT BLD: 12.7 SEC
SODIUM SERPL-SCNC: 129 MMOL/L
SODIUM SERPL-SCNC: 129 MMOL/L

## 2023-02-09 ENCOUNTER — NON-APPOINTMENT (OUTPATIENT)
Age: 78
End: 2023-02-09

## 2023-02-09 ENCOUNTER — APPOINTMENT (OUTPATIENT)
Dept: INFUSION THERAPY | Facility: CLINIC | Age: 78
End: 2023-02-09
Payer: MEDICARE

## 2023-02-09 ENCOUNTER — APPOINTMENT (OUTPATIENT)
Dept: INFUSION THERAPY | Facility: CLINIC | Age: 78
End: 2023-02-09

## 2023-02-09 ENCOUNTER — APPOINTMENT (OUTPATIENT)
Dept: HEMATOLOGY ONCOLOGY | Facility: CLINIC | Age: 78
End: 2023-02-09
Payer: MEDICARE

## 2023-02-09 ENCOUNTER — LABORATORY RESULT (OUTPATIENT)
Age: 78
End: 2023-02-09

## 2023-02-09 ENCOUNTER — APPOINTMENT (OUTPATIENT)
Dept: HEMATOLOGY ONCOLOGY | Facility: CLINIC | Age: 78
End: 2023-02-09

## 2023-02-09 ENCOUNTER — OUTPATIENT (OUTPATIENT)
Dept: OUTPATIENT SERVICES | Facility: HOSPITAL | Age: 78
LOS: 1 days | End: 2023-02-09
Payer: MEDICARE

## 2023-02-09 ENCOUNTER — INPATIENT (INPATIENT)
Facility: HOSPITAL | Age: 78
LOS: 4 days | Discharge: HOME | DRG: 638 | End: 2023-02-14
Attending: STUDENT IN AN ORGANIZED HEALTH CARE EDUCATION/TRAINING PROGRAM | Admitting: STUDENT IN AN ORGANIZED HEALTH CARE EDUCATION/TRAINING PROGRAM
Payer: MEDICARE

## 2023-02-09 VITALS
SYSTOLIC BLOOD PRESSURE: 180 MMHG | HEART RATE: 80 BPM | DIASTOLIC BLOOD PRESSURE: 76 MMHG | TEMPERATURE: 98.3 F | WEIGHT: 136 LBS | HEIGHT: 62 IN | BODY MASS INDEX: 25.03 KG/M2

## 2023-02-09 VITALS
RESPIRATION RATE: 18 BRPM | DIASTOLIC BLOOD PRESSURE: 85 MMHG | HEIGHT: 62 IN | WEIGHT: 128.97 LBS | SYSTOLIC BLOOD PRESSURE: 207 MMHG | TEMPERATURE: 98 F | OXYGEN SATURATION: 100 % | HEART RATE: 78 BPM

## 2023-02-09 DIAGNOSIS — D46.9 MYELODYSPLASTIC SYNDROME, UNSPECIFIED: ICD-10-CM

## 2023-02-09 DIAGNOSIS — E87.5 HYPERKALEMIA: ICD-10-CM

## 2023-02-09 LAB
ALBUMIN SERPL ELPH-MCNC: 3.8 G/DL
ALBUMIN SERPL ELPH-MCNC: 3.8 G/DL — SIGNIFICANT CHANGE UP (ref 3.5–5.2)
ALP BLD-CCNC: 145 U/L
ALP SERPL-CCNC: 147 U/L — HIGH (ref 30–115)
ALT FLD-CCNC: 25 U/L — SIGNIFICANT CHANGE UP (ref 0–41)
ALT SERPL-CCNC: 23 U/L
ANION GAP SERPL CALC-SCNC: 10 MMOL/L — SIGNIFICANT CHANGE UP (ref 7–14)
ANION GAP SERPL CALC-SCNC: 10 MMOL/L — SIGNIFICANT CHANGE UP (ref 7–14)
ANION GAP SERPL CALC-SCNC: 11 MMOL/L
APPEARANCE UR: CLEAR — SIGNIFICANT CHANGE UP
AST SERPL-CCNC: 19 U/L
AST SERPL-CCNC: 23 U/L — SIGNIFICANT CHANGE UP (ref 0–41)
B-OH-BUTYR SERPL-SCNC: <0.2 MMOL/L — SIGNIFICANT CHANGE UP
BACTERIA # UR AUTO: NEGATIVE — SIGNIFICANT CHANGE UP
BASE EXCESS BLDV CALC-SCNC: -1.2 MMOL/L — SIGNIFICANT CHANGE UP (ref -2–3)
BASOPHILS # BLD AUTO: 0.04 K/UL — SIGNIFICANT CHANGE UP (ref 0–0.2)
BASOPHILS NFR BLD AUTO: 0.8 % — SIGNIFICANT CHANGE UP (ref 0–1)
BILIRUB SERPL-MCNC: 0.6 MG/DL
BILIRUB SERPL-MCNC: 0.7 MG/DL — SIGNIFICANT CHANGE UP (ref 0.2–1.2)
BILIRUB UR-MCNC: NEGATIVE — SIGNIFICANT CHANGE UP
BUN SERPL-MCNC: 26 MG/DL — HIGH (ref 10–20)
BUN SERPL-MCNC: 26 MG/DL — HIGH (ref 10–20)
BUN SERPL-MCNC: 27 MG/DL
CA-I SERPL-SCNC: 1.3 MMOL/L — SIGNIFICANT CHANGE UP (ref 1.15–1.33)
CALCIUM SERPL-MCNC: 8.7 MG/DL
CALCIUM SERPL-MCNC: 9 MG/DL — SIGNIFICANT CHANGE UP (ref 8.4–10.4)
CALCIUM SERPL-MCNC: 9.1 MG/DL — SIGNIFICANT CHANGE UP (ref 8.4–10.5)
CHLORIDE SERPL-SCNC: 96 MMOL/L
CHLORIDE SERPL-SCNC: 97 MMOL/L — LOW (ref 98–110)
CHLORIDE SERPL-SCNC: 97 MMOL/L — LOW (ref 98–110)
CO2 SERPL-SCNC: 21 MMOL/L
CO2 SERPL-SCNC: 21 MMOL/L — SIGNIFICANT CHANGE UP (ref 17–32)
CO2 SERPL-SCNC: 23 MMOL/L — SIGNIFICANT CHANGE UP (ref 17–32)
COLOR SPEC: SIGNIFICANT CHANGE UP
CREAT SERPL-MCNC: 1.4 MG/DL
CREAT SERPL-MCNC: 1.4 MG/DL — SIGNIFICANT CHANGE UP (ref 0.7–1.5)
CREAT SERPL-MCNC: 1.5 MG/DL — SIGNIFICANT CHANGE UP (ref 0.7–1.5)
DIFF PNL FLD: NEGATIVE — SIGNIFICANT CHANGE UP
EGFR: 36 ML/MIN/1.73M2 — LOW
EGFR: 39 ML/MIN/1.73M2
EGFR: 39 ML/MIN/1.73M2 — LOW
EOSINOPHIL # BLD AUTO: 0.06 K/UL — SIGNIFICANT CHANGE UP (ref 0–0.7)
EOSINOPHIL NFR BLD AUTO: 1.2 % — SIGNIFICANT CHANGE UP (ref 0–8)
EPI CELLS # UR: 1 /HPF — SIGNIFICANT CHANGE UP (ref 0–5)
GAS PNL BLDV: 131 MMOL/L — LOW (ref 136–145)
GAS PNL BLDV: SIGNIFICANT CHANGE UP
GLUCOSE BLDC GLUCOMTR-MCNC: 406 MG/DL — HIGH (ref 70–99)
GLUCOSE SERPL-MCNC: 474 MG/DL — CRITICAL HIGH (ref 70–99)
GLUCOSE SERPL-MCNC: 498 MG/DL — CRITICAL HIGH (ref 70–99)
GLUCOSE SERPL-MCNC: 637 MG/DL
GLUCOSE UR QL: ABNORMAL
HCO3 BLDV-SCNC: 25 MMOL/L — SIGNIFICANT CHANGE UP (ref 22–29)
HCT VFR BLD CALC: 24.1 %
HCT VFR BLD CALC: 25.4 % — LOW (ref 37–47)
HCT VFR BLDA CALC: 23 % — LOW (ref 39–51)
HGB BLD CALC-MCNC: 7.8 G/DL — LOW (ref 12.6–17.4)
HGB BLD-MCNC: 7.8 G/DL
HGB BLD-MCNC: 8.2 G/DL — LOW (ref 12–16)
HYALINE CASTS # UR AUTO: 0 /LPF — SIGNIFICANT CHANGE UP (ref 0–7)
IMM GRANULOCYTES NFR BLD AUTO: 1.2 % — HIGH (ref 0.1–0.3)
KETONES UR-MCNC: NEGATIVE — SIGNIFICANT CHANGE UP
LACTATE BLDV-MCNC: 4.3 MMOL/L — CRITICAL HIGH (ref 0.5–2)
LEUKOCYTE ESTERASE UR-ACNC: NEGATIVE — SIGNIFICANT CHANGE UP
LYMPHOCYTES # BLD AUTO: 1.08 K/UL — LOW (ref 1.2–3.4)
LYMPHOCYTES # BLD AUTO: 22.4 % — SIGNIFICANT CHANGE UP (ref 20.5–51.1)
MCHC RBC-ENTMCNC: 29.1 PG — SIGNIFICANT CHANGE UP (ref 27–31)
MCHC RBC-ENTMCNC: 29.2 PG
MCHC RBC-ENTMCNC: 32.3 G/DL — SIGNIFICANT CHANGE UP (ref 32–37)
MCHC RBC-ENTMCNC: 32.4 G/DL
MCV RBC AUTO: 90.1 FL — SIGNIFICANT CHANGE UP (ref 81–99)
MCV RBC AUTO: 90.3 FL
MONOCYTES # BLD AUTO: 0.68 K/UL — HIGH (ref 0.1–0.6)
MONOCYTES NFR BLD AUTO: 14.1 % — HIGH (ref 1.7–9.3)
NEUTROPHILS # BLD AUTO: 2.9 K/UL — SIGNIFICANT CHANGE UP (ref 1.4–6.5)
NEUTROPHILS NFR BLD AUTO: 60.3 % — SIGNIFICANT CHANGE UP (ref 42.2–75.2)
NITRITE UR-MCNC: NEGATIVE — SIGNIFICANT CHANGE UP
NRBC # BLD: 0 /100 WBCS — SIGNIFICANT CHANGE UP (ref 0–0)
PCO2 BLDV: 50 MMHG — HIGH (ref 39–42)
PH BLDV: 7.31 — LOW (ref 7.32–7.43)
PH UR: 6.5 — SIGNIFICANT CHANGE UP (ref 5–8)
PLATELET # BLD AUTO: 276 K/UL
PLATELET # BLD AUTO: 294 K/UL — SIGNIFICANT CHANGE UP (ref 130–400)
PMV BLD: 9.9 FL
PO2 BLDV: 35 MMHG — SIGNIFICANT CHANGE UP
POTASSIUM BLDV-SCNC: 5.7 MMOL/L — HIGH (ref 3.5–5.1)
POTASSIUM SERPL-MCNC: 5.7 MMOL/L — HIGH (ref 3.5–5)
POTASSIUM SERPL-MCNC: 6.2 MMOL/L — CRITICAL HIGH (ref 3.5–5)
POTASSIUM SERPL-SCNC: 5.7 MMOL/L — HIGH (ref 3.5–5)
POTASSIUM SERPL-SCNC: 5.9 MMOL/L
POTASSIUM SERPL-SCNC: 6.2 MMOL/L — CRITICAL HIGH (ref 3.5–5)
PROT SERPL-MCNC: 5.4 G/DL
PROT SERPL-MCNC: 5.9 G/DL — LOW (ref 6–8)
PROT UR-MCNC: ABNORMAL
RBC # BLD: 2.67 M/UL
RBC # BLD: 2.82 M/UL — LOW (ref 4.2–5.4)
RBC # FLD: 17.3 % — HIGH (ref 11.5–14.5)
RBC # FLD: 17.5 %
RBC CASTS # UR COMP ASSIST: 0 /HPF — SIGNIFICANT CHANGE UP (ref 0–4)
SAO2 % BLDV: 57.1 % — SIGNIFICANT CHANGE UP
SARS-COV-2 RNA SPEC QL NAA+PROBE: SIGNIFICANT CHANGE UP
SODIUM SERPL-SCNC: 128 MMOL/L
SODIUM SERPL-SCNC: 128 MMOL/L — LOW (ref 135–146)
SODIUM SERPL-SCNC: 130 MMOL/L — LOW (ref 135–146)
SP GR SPEC: 1.02 — SIGNIFICANT CHANGE UP (ref 1.01–1.03)
UROBILINOGEN FLD QL: SIGNIFICANT CHANGE UP
WBC # BLD: 4.82 K/UL — SIGNIFICANT CHANGE UP (ref 4.8–10.8)
WBC # FLD AUTO: 4.82 K/UL — SIGNIFICANT CHANGE UP (ref 4.8–10.8)
WBC # FLD AUTO: 5.26 K/UL
WBC UR QL: 0 /HPF — SIGNIFICANT CHANGE UP (ref 0–5)

## 2023-02-09 PROCEDURE — 36415 COLL VENOUS BLD VENIPUNCTURE: CPT

## 2023-02-09 PROCEDURE — 83986 ASSAY PH BODY FLUID NOS: CPT

## 2023-02-09 PROCEDURE — 84300 ASSAY OF URINE SODIUM: CPT

## 2023-02-09 PROCEDURE — 85025 COMPLETE CBC W/AUTO DIFF WBC: CPT

## 2023-02-09 PROCEDURE — 99214 OFFICE O/P EST MOD 30 MIN: CPT | Mod: 25

## 2023-02-09 PROCEDURE — 83036 HEMOGLOBIN GLYCOSYLATED A1C: CPT

## 2023-02-09 PROCEDURE — 99214 OFFICE O/P EST MOD 30 MIN: CPT

## 2023-02-09 PROCEDURE — 71045 X-RAY EXAM CHEST 1 VIEW: CPT

## 2023-02-09 PROCEDURE — 84540 ASSAY OF URINE/UREA-N: CPT

## 2023-02-09 PROCEDURE — 93010 ELECTROCARDIOGRAM REPORT: CPT | Mod: 76

## 2023-02-09 PROCEDURE — 81001 URINALYSIS AUTO W/SCOPE: CPT

## 2023-02-09 PROCEDURE — 80053 COMPREHEN METABOLIC PANEL: CPT

## 2023-02-09 PROCEDURE — 82570 ASSAY OF URINE CREATININE: CPT

## 2023-02-09 PROCEDURE — 84156 ASSAY OF PROTEIN URINE: CPT

## 2023-02-09 PROCEDURE — 83735 ASSAY OF MAGNESIUM: CPT

## 2023-02-09 PROCEDURE — 96372 THER/PROPH/DIAG INJ SC/IM: CPT

## 2023-02-09 PROCEDURE — 80061 LIPID PANEL: CPT

## 2023-02-09 PROCEDURE — 83605 ASSAY OF LACTIC ACID: CPT

## 2023-02-09 PROCEDURE — 80076 HEPATIC FUNCTION PANEL: CPT

## 2023-02-09 PROCEDURE — 83935 ASSAY OF URINE OSMOLALITY: CPT

## 2023-02-09 PROCEDURE — 80048 BASIC METABOLIC PNL TOTAL CA: CPT

## 2023-02-09 PROCEDURE — 93010 ELECTROCARDIOGRAM REPORT: CPT

## 2023-02-09 PROCEDURE — 85027 COMPLETE CBC AUTOMATED: CPT

## 2023-02-09 PROCEDURE — 84133 ASSAY OF URINE POTASSIUM: CPT

## 2023-02-09 PROCEDURE — 76705 ECHO EXAM OF ABDOMEN: CPT

## 2023-02-09 PROCEDURE — 82962 GLUCOSE BLOOD TEST: CPT

## 2023-02-09 PROCEDURE — 82340 ASSAY OF CALCIUM IN URINE: CPT

## 2023-02-09 RX ORDER — CALCIUM GLUCONATE 100 MG/ML
1 VIAL (ML) INTRAVENOUS ONCE
Refills: 0 | Status: COMPLETED | OUTPATIENT
Start: 2023-02-09 | End: 2023-02-09

## 2023-02-09 RX ORDER — SODIUM ZIRCONIUM CYCLOSILICATE 10 G/10G
10 POWDER, FOR SUSPENSION ORAL ONCE
Refills: 0 | Status: COMPLETED | OUTPATIENT
Start: 2023-02-09 | End: 2023-02-09

## 2023-02-09 RX ORDER — INSULIN HUMAN 100 [IU]/ML
10 INJECTION, SOLUTION SUBCUTANEOUS ONCE
Refills: 0 | Status: COMPLETED | OUTPATIENT
Start: 2023-02-09 | End: 2023-02-09

## 2023-02-09 RX ORDER — LUSPATERCEPT 75 MG/1
110 INJECTION, POWDER, LYOPHILIZED, FOR SOLUTION SUBCUTANEOUS ONCE
Refills: 0 | Status: COMPLETED | OUTPATIENT
Start: 2023-02-09 | End: 2023-02-09

## 2023-02-09 RX ADMIN — LUSPATERCEPT 110 MILLIGRAM(S): 75 INJECTION, POWDER, LYOPHILIZED, FOR SOLUTION SUBCUTANEOUS at 16:36

## 2023-02-09 RX ADMIN — Medication 100 GRAM(S): at 22:20

## 2023-02-09 RX ADMIN — INSULIN HUMAN 10 UNIT(S): 100 INJECTION, SOLUTION SUBCUTANEOUS at 23:33

## 2023-02-09 RX ADMIN — SODIUM ZIRCONIUM CYCLOSILICATE 10 GRAM(S): 10 POWDER, FOR SUSPENSION ORAL at 23:33

## 2023-02-09 NOTE — ED PROVIDER NOTE - ATTENDING CONTRIBUTION TO CARE
I personally evaluated the patient. I reviewed the Resident´s or Physician Assistant´s note (as assigned above), and agree with the findings and plan except as documented in my note.    77-year-old female presents emergency department for elevated potassium from outpatient setting, known to oncology here for work-up of myelodysplastic syndrome.  Admits to compliance with medications and no other new symptomatology.  Denies urinary symptoms.    The review of systems is otherwise unremarkable    No family members present with patient for assistance in care    GENERAL: female in no distress.  Nontoxic  HEENT: EOMI dry oral mucosa  NECK: FROM  CHEST: normal work of breathing noted. CTA bilateral.   CV: pulses intact   EXTR: FROM   NEURO: grossly AAO 3 no focal deficits  SKIN: normal no pallor    Impression: Uncontrolled diabetes    Plan: IV, labs, imaging, IV fluids, likely admission

## 2023-02-09 NOTE — ED PROVIDER NOTE - IV ALTEPLASE INCLUSION HIDDEN
Noted.    We reviewed during his office visit.    He is testing BG 4 times a day and was given Dexcom number to look into a CGM device with Medicare Coverage.        show

## 2023-02-09 NOTE — ED PROVIDER NOTE - PRINCIPAL DIAGNOSIS
[facility] pharmacy called about RX for diabetic test strips  He has directions for testing twice daily, since he is not on insulin [facility] can only bill for testing once daily  It has something to do with their contract with Medicare  He can go to [facility] and they can bill for twice daily testing  Patient does not want to go anywhere but [facility] is concerned about reducing is testing frequency because he is a newly diagnosed diabetic  Does he need to test twice daily? If so I will call patient and explain the need to go elsewhere for his supplies 
Once daily but vary times of day would be fine
Pharmacy notified, new RX sent to [facility] to sign off 
Hyperkalemia

## 2023-02-09 NOTE — ED PROVIDER NOTE - OBJECTIVE STATEMENT
Patient is a 77-year-old female with past medical history of myelodysplastic syndrome, diabetes, hypertension sent in by hematologist Dr. VALENCIA for elevated potassium today.  Patient unable to recall what is potassium was.  Patient says she has been urinating normally.  Patient's states she has been taking oral glucose control medication normally, but that her fingersticks are typically in the 400 range.  Patient otherwise well

## 2023-02-10 ENCOUNTER — TRANSCRIPTION ENCOUNTER (OUTPATIENT)
Age: 78
End: 2023-02-10

## 2023-02-10 DIAGNOSIS — D64.9 ANEMIA, UNSPECIFIED: ICD-10-CM

## 2023-02-10 DIAGNOSIS — Z51.11 ENCOUNTER FOR ANTINEOPLASTIC CHEMOTHERAPY: ICD-10-CM

## 2023-02-10 DIAGNOSIS — D46.9 MYELODYSPLASTIC SYNDROME, UNSPECIFIED: ICD-10-CM

## 2023-02-10 DIAGNOSIS — E53.8 DEFICIENCY OF OTHER SPECIFIED B GROUP VITAMINS: ICD-10-CM

## 2023-02-10 DIAGNOSIS — D75.839 THROMBOCYTOSIS, UNSPECIFIED: ICD-10-CM

## 2023-02-10 LAB
A1C WITH ESTIMATED AVERAGE GLUCOSE RESULT: 8.8 % — HIGH (ref 4–5.6)
ANION GAP SERPL CALC-SCNC: 13 MMOL/L — SIGNIFICANT CHANGE UP (ref 7–14)
APPEARANCE UR: ABNORMAL
BACTERIA # UR AUTO: ABNORMAL
BASOPHILS # BLD AUTO: 0.08 K/UL — SIGNIFICANT CHANGE UP (ref 0–0.2)
BASOPHILS NFR BLD AUTO: 1.8 % — HIGH (ref 0–1)
BILIRUB UR-MCNC: NEGATIVE — SIGNIFICANT CHANGE UP
BUN SERPL-MCNC: 26 MG/DL — HIGH (ref 10–20)
CALCIUM SERPL-MCNC: 9.4 MG/DL — SIGNIFICANT CHANGE UP (ref 8.4–10.5)
CALCIUM UR-MCNC: 27 MG/DL — SIGNIFICANT CHANGE UP
CHLORIDE SERPL-SCNC: 102 MMOL/L — SIGNIFICANT CHANGE UP (ref 98–110)
CHOLEST SERPL-MCNC: 113 MG/DL — SIGNIFICANT CHANGE UP
CO2 SERPL-SCNC: 22 MMOL/L — SIGNIFICANT CHANGE UP (ref 17–32)
COLOR SPEC: YELLOW — SIGNIFICANT CHANGE UP
CREAT ?TM UR-MCNC: 197 MG/DL — SIGNIFICANT CHANGE UP
CREAT SERPL-MCNC: 1.4 MG/DL — SIGNIFICANT CHANGE UP (ref 0.7–1.5)
DIFF PNL FLD: NEGATIVE — SIGNIFICANT CHANGE UP
EGFR: 39 ML/MIN/1.73M2 — LOW
EOSINOPHIL # BLD AUTO: 0.09 K/UL — SIGNIFICANT CHANGE UP (ref 0–0.7)
EOSINOPHIL NFR BLD AUTO: 2 % — SIGNIFICANT CHANGE UP (ref 0–8)
EPI CELLS # UR: 18 /HPF — HIGH (ref 0–5)
ESTIMATED AVERAGE GLUCOSE: 206 MG/DL — HIGH (ref 68–114)
GLUCOSE BLDC GLUCOMTR-MCNC: 154 MG/DL — HIGH (ref 70–99)
GLUCOSE BLDC GLUCOMTR-MCNC: 216 MG/DL — HIGH (ref 70–99)
GLUCOSE BLDC GLUCOMTR-MCNC: 244 MG/DL — HIGH (ref 70–99)
GLUCOSE BLDC GLUCOMTR-MCNC: 251 MG/DL — HIGH (ref 70–99)
GLUCOSE BLDC GLUCOMTR-MCNC: 257 MG/DL — HIGH (ref 70–99)
GLUCOSE SERPL-MCNC: 219 MG/DL — HIGH (ref 70–99)
GLUCOSE UR QL: NEGATIVE — SIGNIFICANT CHANGE UP
HCT VFR BLD CALC: 27.1 % — LOW (ref 37–47)
HDLC SERPL-MCNC: 58 MG/DL — SIGNIFICANT CHANGE UP
HGB BLD-MCNC: 8.6 G/DL — LOW (ref 12–16)
HYALINE CASTS # UR AUTO: 4 /LPF — SIGNIFICANT CHANGE UP (ref 0–7)
IMM GRANULOCYTES NFR BLD AUTO: 1.1 % — HIGH (ref 0.1–0.3)
KETONES UR-MCNC: NEGATIVE — SIGNIFICANT CHANGE UP
LACTATE SERPL-SCNC: 2.8 MMOL/L — HIGH (ref 0.7–2)
LEUKOCYTE ESTERASE UR-ACNC: NEGATIVE — SIGNIFICANT CHANGE UP
LIPID PNL WITH DIRECT LDL SERPL: 34 MG/DL — SIGNIFICANT CHANGE UP
LYMPHOCYTES # BLD AUTO: 1.02 K/UL — LOW (ref 1.2–3.4)
LYMPHOCYTES # BLD AUTO: 23.1 % — SIGNIFICANT CHANGE UP (ref 20.5–51.1)
MAGNESIUM SERPL-MCNC: 1.6 MG/DL — LOW (ref 1.8–2.4)
MCHC RBC-ENTMCNC: 28.7 PG — SIGNIFICANT CHANGE UP (ref 27–31)
MCHC RBC-ENTMCNC: 31.7 G/DL — LOW (ref 32–37)
MCV RBC AUTO: 90.3 FL — SIGNIFICANT CHANGE UP (ref 81–99)
MONOCYTES # BLD AUTO: 0.47 K/UL — SIGNIFICANT CHANGE UP (ref 0.1–0.6)
MONOCYTES NFR BLD AUTO: 10.6 % — HIGH (ref 1.7–9.3)
NEUTROPHILS # BLD AUTO: 2.71 K/UL — SIGNIFICANT CHANGE UP (ref 1.4–6.5)
NEUTROPHILS NFR BLD AUTO: 61.4 % — SIGNIFICANT CHANGE UP (ref 42.2–75.2)
NITRITE UR-MCNC: NEGATIVE — SIGNIFICANT CHANGE UP
NON HDL CHOLESTEROL: 55 MG/DL — SIGNIFICANT CHANGE UP
NRBC # BLD: 0 /100 WBCS — SIGNIFICANT CHANGE UP (ref 0–0)
OSMOLALITY UR: 639 MOS/KG — SIGNIFICANT CHANGE UP (ref 50–1200)
PH UR: 6 — SIGNIFICANT CHANGE UP (ref 5–8)
PLATELET # BLD AUTO: 345 K/UL — SIGNIFICANT CHANGE UP (ref 130–400)
POTASSIUM SERPL-MCNC: 5.3 MMOL/L — HIGH (ref 3.5–5)
POTASSIUM SERPL-SCNC: 5.3 MMOL/L — HIGH (ref 3.5–5)
POTASSIUM UR-SCNC: 45 MMOL/L — SIGNIFICANT CHANGE UP
PROT ?TM UR-MCNC: 31 MG/DLG/24H — SIGNIFICANT CHANGE UP
PROT UR-MCNC: ABNORMAL
PROT/CREAT UR-RTO: 0.2 RATIO — SIGNIFICANT CHANGE UP (ref 0–0.2)
RBC # BLD: 3 M/UL — LOW (ref 4.2–5.4)
RBC # FLD: 17.2 % — HIGH (ref 11.5–14.5)
RBC CASTS # UR COMP ASSIST: 2 /HPF — SIGNIFICANT CHANGE UP (ref 0–4)
SODIUM SERPL-SCNC: 137 MMOL/L — SIGNIFICANT CHANGE UP (ref 135–146)
SODIUM UR-SCNC: 22 MMOL/L — SIGNIFICANT CHANGE UP
SP GR SPEC: 1.02 — SIGNIFICANT CHANGE UP (ref 1.01–1.03)
TRIGL SERPL-MCNC: 107 MG/DL — SIGNIFICANT CHANGE UP
UROBILINOGEN FLD QL: SIGNIFICANT CHANGE UP
UUN UR-MCNC: 991 MG/DL — SIGNIFICANT CHANGE UP
WBC # BLD: 4.42 K/UL — LOW (ref 4.8–10.8)
WBC # FLD AUTO: 4.42 K/UL — LOW (ref 4.8–10.8)
WBC UR QL: 7 /HPF — HIGH (ref 0–5)

## 2023-02-10 PROCEDURE — 71045 X-RAY EXAM CHEST 1 VIEW: CPT | Mod: 26

## 2023-02-10 PROCEDURE — 99222 1ST HOSP IP/OBS MODERATE 55: CPT

## 2023-02-10 RX ORDER — DEXTROSE 50 % IN WATER 50 %
12.5 SYRINGE (ML) INTRAVENOUS ONCE
Refills: 0 | Status: DISCONTINUED | OUTPATIENT
Start: 2023-02-10 | End: 2023-02-14

## 2023-02-10 RX ORDER — MAGNESIUM SULFATE 500 MG/ML
2 VIAL (ML) INJECTION ONCE
Refills: 0 | Status: DISCONTINUED | OUTPATIENT
Start: 2023-02-10 | End: 2023-02-10

## 2023-02-10 RX ORDER — SODIUM ZIRCONIUM CYCLOSILICATE 10 G/10G
5 POWDER, FOR SUSPENSION ORAL ONCE
Refills: 0 | Status: COMPLETED | OUTPATIENT
Start: 2023-02-10 | End: 2023-02-10

## 2023-02-10 RX ORDER — ACETAMINOPHEN 500 MG
650 TABLET ORAL EVERY 6 HOURS
Refills: 0 | Status: DISCONTINUED | OUTPATIENT
Start: 2023-02-10 | End: 2023-02-14

## 2023-02-10 RX ORDER — SODIUM CHLORIDE 9 MG/ML
1000 INJECTION INTRAMUSCULAR; INTRAVENOUS; SUBCUTANEOUS
Refills: 0 | Status: DISCONTINUED | OUTPATIENT
Start: 2023-02-10 | End: 2023-02-11

## 2023-02-10 RX ORDER — INFLUENZA VIRUS VACCINE 15; 15; 15; 15 UG/.5ML; UG/.5ML; UG/.5ML; UG/.5ML
0.7 SUSPENSION INTRAMUSCULAR ONCE
Refills: 0 | Status: DISCONTINUED | OUTPATIENT
Start: 2023-02-10 | End: 2023-02-14

## 2023-02-10 RX ORDER — NIFEDIPINE 30 MG
30 TABLET, EXTENDED RELEASE 24 HR ORAL DAILY
Refills: 0 | Status: DISCONTINUED | OUTPATIENT
Start: 2023-02-10 | End: 2023-02-11

## 2023-02-10 RX ORDER — DEXTROSE 50 % IN WATER 50 %
25 SYRINGE (ML) INTRAVENOUS ONCE
Refills: 0 | Status: DISCONTINUED | OUTPATIENT
Start: 2023-02-10 | End: 2023-02-14

## 2023-02-10 RX ORDER — INSULIN LISPRO 100/ML
5 VIAL (ML) SUBCUTANEOUS
Refills: 0 | Status: DISCONTINUED | OUTPATIENT
Start: 2023-02-10 | End: 2023-02-14

## 2023-02-10 RX ORDER — PANTOPRAZOLE SODIUM 20 MG/1
40 TABLET, DELAYED RELEASE ORAL
Refills: 0 | Status: DISCONTINUED | OUTPATIENT
Start: 2023-02-10 | End: 2023-02-14

## 2023-02-10 RX ORDER — SODIUM CHLORIDE 9 MG/ML
1000 INJECTION, SOLUTION INTRAVENOUS
Refills: 0 | Status: DISCONTINUED | OUTPATIENT
Start: 2023-02-10 | End: 2023-02-14

## 2023-02-10 RX ORDER — INSULIN LISPRO 100/ML
VIAL (ML) SUBCUTANEOUS
Refills: 0 | Status: DISCONTINUED | OUTPATIENT
Start: 2023-02-10 | End: 2023-02-14

## 2023-02-10 RX ORDER — SODIUM ZIRCONIUM CYCLOSILICATE 10 G/10G
10 POWDER, FOR SUSPENSION ORAL EVERY 8 HOURS
Refills: 0 | Status: COMPLETED | OUTPATIENT
Start: 2023-02-10 | End: 2023-02-10

## 2023-02-10 RX ORDER — LANOLIN ALCOHOL/MO/W.PET/CERES
5 CREAM (GRAM) TOPICAL AT BEDTIME
Refills: 0 | Status: DISCONTINUED | OUTPATIENT
Start: 2023-02-10 | End: 2023-02-14

## 2023-02-10 RX ORDER — ASPIRIN/CALCIUM CARB/MAGNESIUM 324 MG
81 TABLET ORAL DAILY
Refills: 0 | Status: DISCONTINUED | OUTPATIENT
Start: 2023-02-10 | End: 2023-02-14

## 2023-02-10 RX ORDER — MAGNESIUM SULFATE 500 MG/ML
2 VIAL (ML) INJECTION ONCE
Refills: 0 | Status: COMPLETED | OUTPATIENT
Start: 2023-02-10 | End: 2023-02-10

## 2023-02-10 RX ORDER — DEXTROSE 50 % IN WATER 50 %
15 SYRINGE (ML) INTRAVENOUS ONCE
Refills: 0 | Status: DISCONTINUED | OUTPATIENT
Start: 2023-02-10 | End: 2023-02-14

## 2023-02-10 RX ORDER — GLUCAGON INJECTION, SOLUTION 0.5 MG/.1ML
1 INJECTION, SOLUTION SUBCUTANEOUS ONCE
Refills: 0 | Status: DISCONTINUED | OUTPATIENT
Start: 2023-02-10 | End: 2023-02-14

## 2023-02-10 RX ORDER — HEPARIN SODIUM 5000 [USP'U]/ML
5000 INJECTION INTRAVENOUS; SUBCUTANEOUS EVERY 8 HOURS
Refills: 0 | Status: DISCONTINUED | OUTPATIENT
Start: 2023-02-10 | End: 2023-02-14

## 2023-02-10 RX ORDER — INSULIN GLARGINE 100 [IU]/ML
15 INJECTION, SOLUTION SUBCUTANEOUS EVERY MORNING
Refills: 0 | Status: DISCONTINUED | OUTPATIENT
Start: 2023-02-10 | End: 2023-02-14

## 2023-02-10 RX ORDER — SODIUM CHLORIDE 9 MG/ML
1000 INJECTION INTRAMUSCULAR; INTRAVENOUS; SUBCUTANEOUS
Refills: 0 | Status: DISCONTINUED | OUTPATIENT
Start: 2023-02-10 | End: 2023-02-10

## 2023-02-10 RX ADMIN — Medication 5 UNIT(S): at 14:06

## 2023-02-10 RX ADMIN — SODIUM ZIRCONIUM CYCLOSILICATE 10 GRAM(S): 10 POWDER, FOR SUSPENSION ORAL at 14:30

## 2023-02-10 RX ADMIN — Medication 81 MILLIGRAM(S): at 12:00

## 2023-02-10 RX ADMIN — HEPARIN SODIUM 5000 UNIT(S): 5000 INJECTION INTRAVENOUS; SUBCUTANEOUS at 22:13

## 2023-02-10 RX ADMIN — PANTOPRAZOLE SODIUM 40 MILLIGRAM(S): 20 TABLET, DELAYED RELEASE ORAL at 08:55

## 2023-02-10 RX ADMIN — Medication 2: at 17:48

## 2023-02-10 RX ADMIN — Medication 5 UNIT(S): at 08:54

## 2023-02-10 RX ADMIN — SODIUM CHLORIDE 60 MILLILITER(S): 9 INJECTION INTRAMUSCULAR; INTRAVENOUS; SUBCUTANEOUS at 08:56

## 2023-02-10 RX ADMIN — HEPARIN SODIUM 5000 UNIT(S): 5000 INJECTION INTRAVENOUS; SUBCUTANEOUS at 14:30

## 2023-02-10 RX ADMIN — SODIUM ZIRCONIUM CYCLOSILICATE 10 GRAM(S): 10 POWDER, FOR SUSPENSION ORAL at 22:11

## 2023-02-10 RX ADMIN — Medication 30 MILLIGRAM(S): at 12:00

## 2023-02-10 RX ADMIN — INSULIN GLARGINE 15 UNIT(S): 100 INJECTION, SOLUTION SUBCUTANEOUS at 08:55

## 2023-02-10 RX ADMIN — SODIUM ZIRCONIUM CYCLOSILICATE 5 GRAM(S): 10 POWDER, FOR SUSPENSION ORAL at 12:00

## 2023-02-10 RX ADMIN — Medication 6: at 08:54

## 2023-02-10 RX ADMIN — Medication 25 GRAM(S): at 12:00

## 2023-02-10 RX ADMIN — Medication 5 UNIT(S): at 17:49

## 2023-02-10 RX ADMIN — Medication 4: at 14:06

## 2023-02-10 NOTE — H&P ADULT - ASSESSMENT
77F with PMHx of MDS, DM, HTN, CKD3 sent in to the ED after her visit with heme/onc for hyperkalemia and hyperglycemia. Pt is on PO DM meds and has had uncontrolled blood sugars for a long time. She was found to have hyperkalemia outpatient but pt doesn't remember the value.    #Uncontrolled type 2 DM with hyperglycemia  - Pt on home glimeperide and **  - Has had uncontrolled FS for a long time, found to have FS > 500 during outpt heme/onc appt  - Glucose 498 -> 409 s/p insulin 10u in ED  - UA negative for ketones, BHB -ve  - F/u A1c  - Start lantus/lispro 15/5/5/5 + ISS  - Outpt f/u with endocrine    #Mild MARCO on CKD3  #Hyperkalemia  #Pseudohyponatremia  - Baseline Cr 1.2, here with 1.5, BUN 26  - K 5.7 (not hemolyzed), pt received insulin 10, lokelma 10, and Ca gluconate  - EKG NSR with no hyperkalemic changes  - Lactate 4.3 -> 2.8, trend    #HTN  #HTN Urgency  - Pt not on any home meds  - /85 -> 196/77 in ED, pt asymptomatic  - Start nifedipine 30mg XL and add extra BP meds prn    #Myelodysplastic syndrome  - Follows with Dr Wade, sent in from office today  - Receives transfusions every 3 weeks and has chronic diarrhea 2/2 treatment  - Hb 8.2 at baseline  - Outpt heme/onc follow up    DVT ppx: subq hep  GI ppx: Protonix  Diet: DASH/TLC  Activitty 77F with PMHx of MDS, DM, HTN, CKD3 sent in to the ED after her visit with heme/onc for hyperkalemia and hyperglycemia. Pt is on PO DM meds and has had uncontrolled blood sugars for a long time. She was found to have hyperkalemia outpatient but pt doesn't remember the value.    #Uncontrolled type 2 DM with hyperglycemia  - Pt on home glimeperide and repaglinide   - Has had uncontrolled FS for a long time, found to have FS > 500 during outpt heme/onc appt  - Glucose 498 -> 409 s/p insulin 10u in ED  - UA negative for ketones, BHB -ve  - F/u A1c  - Start lantus/lispro 15/5/5/5 + ISS  - DASH/CC diet  - Outpt f/u with endocrine    #Mild MARCO on CKD3  #Hyperkalemia  #Pseudohyponatremia  - Baseline Cr 1.2, here with 1.5, BUN 26  - K 5.7 (not hemolyzed), pt received insulin 10, lokelma 10, and Ca gluconate  - EKG NSR with no hyperkalemic changes  - Lactate 4.3 -> 2.8, trend  - Encourage PO intake    #HTN  #HTN Urgency  - Pt not on any home meds  - /85 -> 196/77 in ED, pt asymptomatic  - Start nifedipine 30mg XL and add extra BP meds prn    #Myelodysplastic syndrome  - Follows with Dr Wade, sent in from office today  - Receives transfusions every 3 weeks and has chronic diarrhea 2/2 treatment  - Hb 8.2 at baseline  - Outpt heme/onc follow up    DVT ppx: subq hep  GI ppx: Protonix  Diet: DASH/TLC  Activity: IAT  Dispo: Acute

## 2023-02-10 NOTE — CONSULT NOTE ADULT - SUBJECTIVE AND OBJECTIVE BOX
LENGTH OF HOSPITAL STAY: 1d      CHIEF COMPLAINT: Patient is a 77y old  Female who presents with a chief complaint of Hyperglycemia, Hyperkalemia (10 Feb 2023 05:38)      HISTORY OF PRESENTING ILLNESS:    HPI:  77F with PMHx of MDS, DM, HTN, CKD3 sent in to the ED after her visit with heme/onc for hyperkalemia and hyperglycemia. Pt is on PO DM meds and has had uncontrolled blood sugars for a long time. She was found to have hyperkalemia outpatient but pt doesn't remember the value. She also has uncontrolled diabetes, and was found to have FS > 500 today after eating cheesecake. Pt denies any symptoms of hyper or hypoglycemia and has never had hypoglycemic episodes. She follows with Dr. Wade for management of MDS and is on transfusions around [id number]. She endorses chronic diarrhea 2/2 side effects from MDS treatment with a baseline of 3-5 BMs per day. Pt denies any CP, SOB, headache, blurry vision, palpitations, abd pain, N/V/C, or any  symptoms.    in the ED: /85, HR 78, T 98F, RR 18 satting 100% on RA. Labs notable for Hb 8 (chronic at baseline), Na 130, K 5.7, glucose 474. Cr 1.5 (baseline 1.2). VBG shows pH 7.31, pCO2 50, lactate 4.3 -> 2.8. UA negative for ketones, BHB -ve. EKG NSR no hyperkalemic changes. Pt given insulin, calcium, and lokelma in ED, admitted to medicine.  (10 Feb 2023 05:38)    PAST MEDICAL & SURGICAL HISTORY  PAST MEDICAL & SURGICAL HISTORY:  DM (diabetes mellitus)      MDS (myelodysplastic syndrome)      No significant past surgical history            REVIEW OF SYSTEMS  Negative, except as in Physical exam    ALLERGIES:  No Known Allergies    MEDICATIONS:  STANDING MEDICATIONS  aspirin enteric coated 81 milliGRAM(s) Oral daily  dextrose 5%. 1000 milliLiter(s) IV Continuous <Continuous>  dextrose 5%. 1000 milliLiter(s) IV Continuous <Continuous>  dextrose 50% Injectable 25 Gram(s) IV Push once  dextrose 50% Injectable 12.5 Gram(s) IV Push once  dextrose 50% Injectable 25 Gram(s) IV Push once  glucagon  Injectable 1 milliGRAM(s) IntraMuscular once  heparin   Injectable 5000 Unit(s) SubCutaneous every 8 hours  insulin glargine Injectable (LANTUS) 15 Unit(s) SubCutaneous every morning  insulin lispro (ADMELOG) corrective regimen sliding scale   SubCutaneous three times a day before meals  insulin lispro Injectable (ADMELOG) 5 Unit(s) SubCutaneous three times a day before meals  magnesium sulfate  IVPB 2 Gram(s) IV Intermittent once  NIFEdipine XL 30 milliGRAM(s) Oral daily  pantoprazole    Tablet 40 milliGRAM(s) Oral before breakfast  sodium chloride 0.9%. 1000 milliLiter(s) IV Continuous <Continuous>  sodium zirconium cyclosilicate 5 Gram(s) Oral once      PRN MEDICATIONS  acetaminophen     Tablet .. 650 milliGRAM(s) Oral every 6 hours PRN  dextrose Oral Gel 15 Gram(s) Oral once PRN  melatonin 5 milliGRAM(s) Oral at bedtime PRN    VITALS:   T(F): 98.1  HR: 92  BP: 175/76  RR: 18  SpO2: 96%        LABS:                        8.6    4.42  )-----------( 345      ( 10 Feb 2023 09:02 )             27.1     02-10    137  |  102  |  26<H>  ----------------------------<  219<H>  5.3<H>   |  22  |  1.4    Ca    9.4      10 Feb 2023 09:02  Mg     1.6     02-10    TPro  5.9<L>  /  Alb  3.8  /  TBili  0.7  /  DBili  x   /  AST  23  /  ALT  25  /  AlkPhos  147<H>        Urinalysis Basic - ( 2023 20:25 )    Color: Light Yellow / Appearance: Clear / S.018 / pH: x  Gluc: x / Ketone: Negative  / Bili: Negative / Urobili: <2 mg/dL   Blood: x / Protein: 100 mg/dL / Nitrite: Negative   Leuk Esterase: Negative / RBC: 0 /HPF / WBC 0 /HPF   Sq Epi: x / Non Sq Epi: 1 /HPF / Bacteria: Negative        Lactate, Blood: 2.8 mmol/L *H* (02-10-23 @ 00:55)

## 2023-02-10 NOTE — H&P ADULT - ATTENDING COMMENTS
77F with PMHx of MDS, DM, HTN, CKD3 sent in to the ED after her visit with heme/onc for hyperkalemia and hyperglycemia. Pt is on PO DM meds and has had uncontrolled blood sugars for a long time. She was found to have hyperkalemia outpatient but pt doesn't remember the value.    1. Uncontrolled type 2 DM with hyperglycemia complicated by dehydration leading to acute kidney injury on top of chronic kidney disease associated with hyperkalemia   * Pt on home glimeperide and repaglinide   - Admit to medicine              - Ua: negative for ketone             - Beta hydroxybutyrate:negative  - TSH:pending                       - CRP:                              - CXR: left hazziness   - Cont insulin protocol   - HGba1c:Pending   - Start NS at 100 ml/hr   * Last hba1c:7  - Endocrine:pending     2. Pseudohyponatremia secondary to hyperglycemia  - Observe    3. HTN Urgency  - Pt not on any home meds  - /85 -> 196/77 in ED, pt asymptomatic  - Pt was noted to have chronic hyperkalemia. Hold on ACEI for hypertension   - Start nifedipine 30mg XL and add extra BP meds prn    4. Myelodysplastic syndrome  - Follows with Dr Wade, sent in from office today  - Receives transfusions every 3 weeks and has chronic diarrhea 2/2 treatment  - Hb 8.2 at baseline  - Outpt heme/onc follow up    DVT ppx: subq hep  GI ppx: Protonix  Diet: DASH/TLC  Activity: IAT  Dispo: Acute    Code 77F with PMHx of MDS, DM, HTN, CKD3 sent in to the ED after her visit with heme/onc for hyperkalemia and hyperglycemia. Pt is on PO DM meds and has had uncontrolled blood sugars for a long time. She was found to have hyperkalemia outpatient but pt doesn't remember the value.    1. Uncontrolled type 2 DM with hyperglycemia complicated by dehydration leading to acute kidney injury on top of chronic kidney disease associated with hyperkalemia   * Pt on home glimeperide and repaglinide   - Admit to medicine              - Ua: negative for ketone             - Beta hydroxybutyrate:negative  - TSH:pending                       - CRP:pending                            - CXR: left hazziness (no signs of pneumonia)   - Cont insulin protocol   - HGba1c:8.8   * Last hba1c:7  - Start NS at 100 ml/hr   - Endocrine:pending     2. Pseudohyponatremia secondary to hyperglycemia  - Observe    3. HTN Urgency  - Pt not on any home meds  - /85 -> 196/77 in ED, pt asymptomatic  - Pt was noted to have chronic hyperkalemia. Hold on ACEI for hypertension   - Start nifedipine 30mg XL and add extra BP meds prn    4. Myelodysplastic syndrome  - Follows with Dr Wade, sent in from office today  - Receives transfusions every 3 weeks and has chronic diarrhea 2/2 treatment  - Hb 8.2 at baseline  - Outpt heme/onc follow up    DVT ppx: subq hep  GI ppx: Protonix  Diet: DASH/TLC  Activity: IAT  Dispo: Acute

## 2023-02-10 NOTE — H&P ADULT - HISTORY OF PRESENT ILLNESS
77F with PMHx of MDS, DM, HTN, CKD3 sent in to the ED after her visit with heme/onc for hyperkalemia and hyperglycemia. Pt is on PO DM meds and has had uncontrolled blood sugars for a long time. She was found to have hyperkalemia outpatient but pt doesn't remember the value. She also has uncontrolled diabetes, and was found to have FS > 500 today after eating cheesecake. Pt denies any symptoms of hyper or hypoglycemia and has never had hypoglycemic episodes. She follows with Dr. Wade for management of MDS and is on transfusion around [id number]. She endorses chronic diarrhea 2/2 side effects from MDS treatment with a baseline of 3-5 BMs per day. Pt denies any CP, SOB, headache, blurry vision, palpitations, abd pain, N/V/C, or any  symptoms.    in the ED: /85, HR 78, T 98F, RR 18 satting 100% on RA. Labs notable for Hb 8 (chronic at baseline), Na 130, K 5.7, glucose 474. Cr 1.5 (baseline 1.2). VBG shows pH 7.31, pCO2 50, lactate 4.3 -> 2.8. UA negative for ketones, BHB -ve. EKG NSR no hyperkalemic changes. Pt given insulin, calcium, and lokelma in ED, admitted to medicine.  77F with PMHx of MDS, DM, HTN, CKD3 sent in to the ED after her visit with heme/onc for hyperkalemia and hyperglycemia. Pt is on PO DM meds and has had uncontrolled blood sugars for a long time. She was found to have hyperkalemia outpatient but pt doesn't remember the value. She also has uncontrolled diabetes, and was found to have FS > 500 today after eating cheesecake. Pt denies any symptoms of hyper or hypoglycemia and has never had hypoglycemic episodes. She follows with Dr. Wade for management of MDS and is on transfusions around [id number]. She endorses chronic diarrhea 2/2 side effects from MDS treatment with a baseline of 3-5 BMs per day. Pt denies any CP, SOB, headache, blurry vision, palpitations, abd pain, N/V/C, or any  symptoms.    in the ED: /85, HR 78, T 98F, RR 18 satting 100% on RA. Labs notable for Hb 8 (chronic at baseline), Na 130, K 5.7, glucose 474. Cr 1.5 (baseline 1.2). VBG shows pH 7.31, pCO2 50, lactate 4.3 -> 2.8. UA negative for ketones, BHB -ve. EKG NSR no hyperkalemic changes. Pt given insulin, calcium, and lokelma in ED, admitted to medicine.

## 2023-02-10 NOTE — DISCHARGE NOTE PROVIDER - CARE PROVIDERS DIRECT ADDRESSES
[email or web address]

## 2023-02-10 NOTE — DISCHARGE NOTE PROVIDER - NSDCCPCAREPLAN_GEN_ALL_CORE_FT
PRINCIPAL DISCHARGE DIAGNOSIS  Diagnosis: Hyperkalemia  Assessment and Plan of Treatment:       SECONDARY DISCHARGE DIAGNOSES  Diagnosis: Hyperglycemia  Assessment and Plan of Treatment:      PRINCIPAL DISCHARGE DIAGNOSIS  Diagnosis: Hyperglycemia  Assessment and Plan of Treatment: Your blood sugars were found to be elevated. We started you on insulin injections in the hospital and your sugars improved. Continue taking Lantus insulin 15 units in the morning and Glimepiride 2mg daily. Follow-up with your PCP and Endocrinologist. Continue checking your sugar levels at home.      SECONDARY DISCHARGE DIAGNOSES  Diagnosis: Hyperkalemia  Assessment and Plan of Treatment: Your potassium level was elevated. We gave you medications to lower the potassium and the levels improved. Eat a low potassium diet. Follow-up with your PCP.    Diagnosis: Transaminitis  Assessment and Plan of Treatment: Your liver enzymes were elevated. An ultrasound of your abdomen was done and showed some gall stones in the gall bladder as well as "sludge". Follow-up with your PCP fo further management.

## 2023-02-10 NOTE — DISCHARGE NOTE PROVIDER - PROVIDER TOKENS
PROVIDER:[TOKEN:[[id number]],FOLLOWUP:[2 weeks]],PROVIDER:[TOKEN:[[id number]],FOLLOWUP:[1 month]] PROVIDER:[TOKEN:[[id number]],FOLLOWUP:[2 weeks]],PROVIDER:[TOKEN:[[id number]],FOLLOWUP:[1 month]],PROVIDER:[TOKEN:[[id number]],FOLLOWUP:[1 week]]

## 2023-02-10 NOTE — PATIENT PROFILE ADULT - FUNCTIONAL ASSESSMENT - BASIC MOBILITY 5.
Detail Level: Generalized General Sunscreen Counseling: I recommended a broad spectrum sunscreen with a SPF of 30 or higher.  I explained that SPF 30 sunscreens block approximately 97 percent of the sun's harmful rays.  Sunscreens should be applied at least 15 minutes prior to expected sun exposure and then every 2 hours after that as long as sun exposure continues. If swimming or exercising sunscreen should be reapplied every 45 minutes to an hour after getting wet or sweating.  One ounce, or the equivalent of a shot glass full of sunscreen, is adequate to protect the skin not covered by a bathing suit. I also recommended a lip balm with a sunscreen as well. Sun protective clothing can be used in lieu of sunscreen but must be worn the entire time you are exposed to the sun's rays. 3 = A little assistance

## 2023-02-10 NOTE — H&P ADULT - NSHPREVIEWOFSYSTEMS_GEN_ALL_CORE
CONSTITUTIONAL: No weakness, fevers or chills  EYES/ENT: No visual changes;  No vertigo or throat pain   NECK: No pain or stiffness  RESPIRATORY: No cough, wheezing, hemoptysis; No shortness of breath  CARDIOVASCULAR: No chest pain or palpitations  GASTROINTESTINAL: No abdominal or epigastric pain. No nausea, vomiting, or hematemesis; (+) chronic diarrhea No constipation. No melena or hematochezia.  GENITOURINARY: No dysuria, frequency or hematuria  NEUROLOGICAL: No numbness or weakness  SKIN: No itching, rashes

## 2023-02-10 NOTE — DISCHARGE NOTE PROVIDER - CARE PROVIDER_API CALL
Kaveh Wade)  Hematology; Internal Medicine; Medical Oncology  [address] [place] [address]  Phone: [phone]  Fax: [phone]  Follow Up Time: 2 weeks    Ian Weeks)  EndocrinologyMetabDiabetes; Internal Medicine  [address] [place] [address]  Phone: [phone]  Fax: [phone]  Follow Up Time: 1 month   Kaveh Wade)  Hematology; Internal Medicine; Medical Oncology  [address] [place] [address]  Phone: [phone]  Fax: [phone]  Follow Up Time: 2 weeks    Ian Weeks)  EndocrinologyMetabDiabetes; Internal Medicine  [address] [place] [address]  Phone: [phone]  Fax: [phone]  Follow Up Time: 1 month    ERNESTO SIEGEL  Internal Medicine  [address] [place] [address]  Phone: [phone]  Fax: [phone]  Follow Up Time: 1 week

## 2023-02-10 NOTE — CONSULT NOTE ADULT - ATTENDING COMMENTS
Uncontrolled type 2 DM with recent polyuria/polydipsia and BS consistently > 300.  Basal/bolus insulin while in hospital; for home recommend Lantus 20 units qAM and glimeperide 2 mg qAM.  Encouraged follow-up in office in 3-4 weeks.

## 2023-02-10 NOTE — DISCHARGE NOTE PROVIDER - NSDCMRMEDTOKEN_GEN_ALL_CORE_FT
Aspir 81 oral delayed release tablet: 1 tab(s) orally once a day  glimepiride 4 mg oral tablet: 1 tab(s) orally 2 times a day  repaglinide 0.5 mg oral tablet: 1 tab(s) orally 3 times a day (before meals)   Aspir 81 oral delayed release tablet: 1 tab(s) orally once a day  insulin glargine 100 units/mL subcutaneous solution: 15 unit(s) subcutaneous   repaglinide 0.5 mg oral tablet: 1 tab(s) orally 3 times a day (before meals)   Aspir 81 oral delayed release tablet: 1 tab(s) orally once a day  glimepiride 2 mg oral tablet: 1 tab(s) orally once a day   insulin glargine 100 units/mL subcutaneous solution: 15 unit(s) subcutaneous once a day (in the morning)  NIFEdipine 60 mg oral tablet, extended release: 1 tab(s) orally once a day

## 2023-02-10 NOTE — PHYSICAL EXAM
[Ambulatory and capable of all self care but unable to carry out any work activities] : Status 2- Ambulatory and capable of all self care but unable to carry out any work activities. Up and about more than 50% of waking hours [Normal] : affect appropriate [de-identified] : On a wheelchair [de-identified] : Right knee pain

## 2023-02-10 NOTE — REVIEW OF SYSTEMS
[Fatigue] : fatigue [Recent Change In Weight] : ~T recent weight change [Negative] : Allergic/Immunologic [Shortness Of Breath] : no shortness of breath [Diarrhea] : no diarrhea [FreeTextEntry2] : weight loss 5 lbs in the last 4 months; pale in appearance [FreeTextEntry9] :  right knee pain

## 2023-02-10 NOTE — ASSESSMENT
[FreeTextEntry1] : Patient is a 77 year old female with # Lowrisk myelodysplastic syndrome, previously treated with Revlimid and Procrit and Vidaza . 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 \par Patient is s/p  9 cycles of Vidaza and she was requiring pRBC transfusion every 3 weeks \par Given the persistent transfusion dependence she was switched to Luspatercept.\par \par DETECTED GENOMIC ALTERATIONS:\par Tier III: Variants of Unknown Clinical Significance\par SF3B1 [id number]\par Hyperkalemia likely due to increased platelet; pseudohyperkalemia.\par \par \par PLAN:\par Previous notes reviewed and all relevant laboratory results discussed with Dr Wade and were communicated to the patient and her family.\par \par -- Continue Luspatercept 1.75 mg/kg every 3 weeks. Hgb 7.8 gm/dL, severe anemia. She is due today. \par -- Will hold Procrit today since she is hypertensive and hgb is stable\par -- Recheck CBC next week and re-assess for Retacrit (Procrit)  80,000 units every week.\par Side effects of Procrit discussed including but not limited to: hypertension, headache, pruritus, nausea, vomiting, injection site pain, arthralgia, cough, fever.\par -- Monitor CBC weekly and administer 1 PRBC unit  as needed when Hgb < 7 gm/dL.  Pt has e/o iron overload so transfusions need to be kept at a minimum if possible.\par \par # Vitamin  B 12 deficiency\par -- Continue Vitamin B 12 injection every month (Next dose March 2023)\par \par # Thrombocytosis (controlled)\par -- previously on Hydrea\par -- Will continue to monitor.\par \par # Hyperglycemia\par # Hypertensive urgency\par -- we are sending to ER for her elevated serum glucose and hypertension\par -- counselled her to follow up with her PCP for hypertension with worsening renal function\par \par Results of  BM biopsy d/w pt. No e/o increased blasts. Pt is needing PRBCs almost every 3 weeks. Advised to follow up with Dr Ferrell to consider any other option or clinical trial. Called Dr Ferrell's office and discussed case with her. No clinical trial available, Rec PRBCS.\par \par RTC in 3 weeks\par CBC check every week\par \par Case was seen and discussed with Dr. Wade who agreed with assessment and plan.\par

## 2023-02-10 NOTE — DISCHARGE NOTE PROVIDER - HOSPITAL COURSE
77F with PMHx of MDS, DM, HTN, CKD3 sent in to the ED after her visit with heme/onc for hyperkalemia and hyperglycemia. Pt is on PO DM meds and has had uncontrolled blood sugars for a long time. She was found to have hyperkalemia outpatient but pt doesn't remember the value. She also has uncontrolled diabetes, and was found to have FS > 500 today after eating cheesecake. Pt denies any symptoms of hyper or hypoglycemia and has never had hypoglycemic episodes. She follows with Dr. Wade for management of MDS and is on transfusions around q5xnuby. She endorses chronic diarrhea 2/2 side effects from MDS treatment with a baseline of 3-5 BMs per day. Pt denies any CP, SOB, headache, blurry vision, palpitations, abd pain, N/V/C, or any  symptoms.    in the ED: /85, HR 78, T 98F, RR 18 satting 100% on RA. Labs notable for Hb 8 (chronic at baseline), Na 130, K 5.7, glucose 474. Cr 1.5 (baseline 1.2). VBG shows pH 7.31, pCO2 50, lactate 4.3 -> 2.8. UA negative for ketones, BHB -ve. EKG NSR no hyperkalemic changes. Pt given insulin, calcium, and lokelma in ED, admitted to medicine.     Hospital Course:      1. Uncontrolled type 2 DM with hyperglycemia complicated by dehydration leading to acute kidney injury on top of chronic kidney disease associated with hyperkalemia   * Pt on home glimeperide and repaglinide   - Admit to medicine              - Ua: negative for ketone             - Beta hydroxybutyrate:negative  - TSH:pending                       - CRP:pending                            - CXR: left hazziness (no signs of pneumonia)   - Cont insulin protocol   - HGba1c:8.8   * Last hba1c:7  - Start NS at 100 ml/hr   - Endocrine:pending     2. Pseudohyponatremia secondary to hyperglycemia  - Observe    3. HTN Urgency  - Pt not on any home meds  - /85 -> 196/77 in ED, pt asymptomatic  - Pt was noted to have chronic hyperkalemia. Hold on ACEI for hypertension   - Start nifedipine 30mg XL and add extra BP meds prn    4. Myelodysplastic syndrome  - Follows with Dr Wade, sent in from office today  - Receives transfusions every 3 weeks and has chronic diarrhea 2/2 treatment  - Hb 8.2 at baseline  - Outpt heme/onc follow up    DVT ppx: subq hep  GI ppx: Protonix  Diet: DASH/TLC  Activity: IAT  Dispo: Acute.       Patient was treated with basal/bolus insulin for her hyperglycemia and was given K repletion for her hypokalemia.    She will be discharged on Lantus 20mg qaM and glimepiride 2mg qAM with plans for follow-up in 3-4 weeks. 77F with PMH of HTN, DM 2, CKD 3 and MDS sent in to the ED after her visit with heme/onc for hyperkalemia and hyperglycemia. Pt is on PO DM meds and has had uncontrolled blood sugars for a long time. She was found to have hyperkalemia and FS > 500. She endorses chronic diarrhea 2/2 side effects from MDS treatment with a baseline of 3-5 BMs per day. In the ED: /85, HR 78, T 98F, RR 18 satting 100% on RA. Labs notable for Hb 8 (chronic at baseline), Na 130, K 5.7, glucose 474. Cr 1.5 (baseline 1.2). VBG shows pH 7.31, pCO2 50, lactate 4.3 -> 2.8. UA negative for ketones, BHB -ve. EKG NSR no hyperkalemic changes. Pt given insulin, calcium, and lokelma in ED, admitted to medicine.   Patient was started on IV fluid, Lantus, FS improved. BP improved.     A/P:   Uncontrolled DM type 2:   A1C 8.8  Seen by Endocrinology, recommended to discharge on Lantus 20 units AM and Glimepiride 2mg daily   Diabetic diet.     Hyperkalemia: improved.   low potassium diet.     CKD stage 3:   Cr 1.3 stable.       Hypertensive Urgency:   BP improved.   Patient with hyperkalemia. Hold on ACEI for hypertension   Continue Nifedipine 60mg po daily.     Myelodysplastic syndrome  Follows with Dr Wade  Receives transfusions every 3 weeks and has chronic diarrhea 2/2 treatment  Hb 8.2 at baseline  - Outpt heme/onc follow up    Elevated LFTs: 77F with PMHx of MDS, DM, HTN, CKD3 sent in to the ED after her visit with heme/onc for hyperkalemia and hyperglycemia. Pt is on PO DM meds and has had uncontrolled blood sugars for a long time.     # Elevated LFTs  # Chronic ALP elevation   - >168>166  - AST 23>146>149  - ALT 24>120>123  - direct bili 0.3, indirect bili 0.7  - pt has mild TTP in RUQ, resolved  - RUQ U/S: cholelithiasis, GB sludge without sonographic evidence of cholecystitis  - trend LFTs    # Uncontrolled type 2 DM with hyperglycemia  # Hypoglycemia episode   - Pt on home Glimeperide and Repaglinide   - Has had uncontrolled FS for a long time, found to have FS > 500 during outpt heme/onc appt  - Glucose 498 -> 409 s/p insulin 10u in ED  - UA negative for ketones, BHB -ve  - A1c 8.2  - Start lantus/lispro 15/5/5/5 + ISS  - DASH/CC diet  - Seen by Endocrinology, recommended to discharge on Lantus 20 units AM and Glimepiride 2mg daily  - will d/c on Lantus 15, Glimepiride 2mg qd    # Mild MARCO on CKD3, stable Cr 1.3  # Hyperkalemia, resolved  # Pseudohyponatremia  - hold ACE-I  - Baseline Cr 1.2, here with 1.5, BUN 26  - K 5.7 (not hemolyzed), pt received insulin 10, lokelma 10, and Ca gluconate  - EKG NSR with no hyperkalemic changes  - Lactate 4.3 -> 2.8, trend  - Encourage PO intake    # HTN  # HTN Urgency, imrpoved  - not on any home meds  - /85 -> 196/77 in ED, pt asymptomatic  - c/w Nifedipine 60 XL 2/12    # Myelodysplastic syndrome  - Follows with Dr Wade, sent in from office today  - Receives transfusions every 3 weeks and has chronic diarrhea 2/2 treatment  - Hb stable around 8  - Outpt heme/onc follow up    Patient is stable and ready for d/c to home with [facility]         77F with PMHx of MDS, DM, HTN, CKD3 sent in to the ED after her visit with heme/onc for hyperkalemia and hyperglycemia. Pt is on PO DM meds and has had uncontrolled blood sugars for a long time.     # Elevated LFTs, downtrending   # Chronic ALP elevation   - >168>166 >157  - AST 23>146>149>49  - ALT 24>120>123>58  - direct bili 0.3, indirect bili 0.7  - pt has mild TTP in RUQ, resolved  - RUQ U/S: cholelithiasis, GB sludge without sonographic evidence of cholecystitis  - out-patient f/u    # Uncontrolled type 2 DM with hyperglycemia  # Hypoglycemia episode   - Pt on home Glimeperide and Repaglinide   - Has had uncontrolled FS for a long time, found to have FS > 500 during outpt heme/onc appt  - Glucose 498 -> 409 s/p insulin 10u in ED  - UA negative for ketones, BHB -ve  - A1c 8.2  - Start lantus/lispro 15/5/5/5 + ISS  - DASH/CC diet  - Seen by Endocrinology, recommended to discharge on Lantus 20 units AM and Glimepiride 2mg daily  - will d/c on Lantus 15, Glimepiride 2mg qd    # Mild MARCO on CKD3, stable Cr 1.3  # Hyperkalemia, resolved  # Pseudohyponatremia  - hold ACE-I  - Baseline Cr 1.2, here with 1.5, BUN 26  - K 5.7 (not hemolyzed), pt received insulin 10, lokelma 10, and Ca gluconate  - EKG NSR with no hyperkalemic changes  - Lactate 4.3 -> 2.8, trend  - Encourage PO intake    # HTN  # HTN Urgency, imrpoved  - not on any home meds  - /85 -> 196/77 in ED, pt asymptomatic  - c/w Nifedipine 60 XL 2/12    # Myelodysplastic syndrome  - Follows with Dr Wade, sent in from office today  - Receives transfusions every 3 weeks and has chronic diarrhea 2/2 treatment  - Hb stable around 8  - Outpt heme/onc follow up    Patient is stable and ready for d/c to home with [facility]         77F with PMHx of MDS, DM, HTN, CKD3 sent in to the ED after her visit with heme/onc for hyperkalemia and hyperglycemia. Pt is on PO DM meds and has had uncontrolled blood sugars for a long time.     # Elevated LFTs, downtrending   # Chronic ALP elevation    - >168>166 >157  - AST 23>146>149>49  - ALT 24>120>123>58  - direct bili 0.3, indirect bili 0.7  - pt has mild TTP in RUQ, resolved  - RUQ U/S: cholelithiasis, GB sludge without sonographic evidence of cholecystitis  - out-patient f/u    # Uncontrolled type 2 DM with hyperglycemia  # Hypoglycemia episode   - Pt on home Glimeperide and Repaglinide   - Has had uncontrolled FS for a long time, found to have FS > 500 during outpt heme/onc appt  - Glucose 498 -> 409 s/p insulin 10u in ED  - UA negative for ketones, BHB -ve  - A1c 8.2  - Start lantus/lispro 15/5/5/5 + ISS  - DASH/CC diet  - Seen by Endocrinology, recommended to discharge on Lantus 20 units AM and Glimepiride 2mg daily  - will d/c on Lantus 15, Glimepiride 2mg qd    # Mild MARCO on CKD3, stable Cr 1.3  # Hyperkalemia, resolved  # Pseudohyponatremia  - hold ACE-I  - Baseline Cr 1.2, here with 1.5, BUN 26  - K 5.7 (not hemolyzed), pt received insulin 10, lokelma 10, and Ca gluconate  - EKG NSR with no hyperkalemic changes  - Lactate 4.3 -> 2.8, trend  - Encourage PO intake    # HTN  # HTN Urgency, imrpoved  - not on any home meds  - /85 -> 196/77 in ED, pt asymptomatic  - c/w Nifedipine 60 XL 2/12    # Myelodysplastic syndrome  - Follows with Dr Wade, sent in from office today  - Receives transfusions every 3 weeks and has chronic diarrhea 2/2 treatment  - Hb stable around 8  - Outpt heme/onc follow up    Patient is stable and ready for d/c to home with [facility]         77F with PMHx of MDS, DM, HTN, CKD3 sent in to the ED after her visit with heme/onc for hyperkalemia and hyperglycemia. Pt is on PO DM meds and has had uncontrolled blood sugars for a long time.     # Elevated LFTs, downtrending   # Chronic ALP elevation    - >168>166 >157  - AST 23>146>149>49  - ALT 24>120>123>58  - direct bili 0.3, indirect bili 0.7  - pt has mild TTP in RUQ, resolved  - RUQ U/S: cholelithiasis, GB sludge without sonographic evidence of cholecystitis  - out-patient f/u    # Uncontrolled type 2 DM with hyperglycemia  # Hypoglycemia episode   - Pt on home Glimeperide and Repaglinide   - Has had uncontrolled FS for a long time, found to have FS > 500 during outpt heme/onc appt  - Glucose 498 -> 409 s/p insulin 10u in ED  - UA negative for ketones, BHB -ve  - A1c 8.2  - Start lantus/lispro 15/5/5/5 + ISS  - DASH/CC diet  - Seen by Endocrinology, recommended to discharge on Lantus 20 units AM and Glimepiride 2mg daily  - will d/c on Lantus 15, Glimepiride 2mg qd    # Mild MARCO on CKD3, stable Cr 1.3  # Hyperkalemia, resolved  # Pseudohyponatremia  - hold ACE-I  - Baseline Cr 1.2, here with 1.5, BUN 26  - K 5.7 (not hemolyzed), pt received insulin 10, lokelma 10, and Ca gluconate  - EKG NSR with no hyperkalemic changes  - Lactate 4.3 -> 2.8 -> 1.7  - Encourage PO intake    # HTN  # HTN Urgency, imrpoved  - not on any home meds  - /85 -> 196/77 in ED, pt asymptomatic  - c/w Nifedipine 60 XL 2/12    # Myelodysplastic syndrome  - Follows with Dr Wade, sent in from office today  - Receives transfusions every 3 weeks and has chronic diarrhea 2/2 treatment  - Hb stable around 8  - Outpt heme/onc follow up    Patient is stable and ready for d/c to home with [facility]

## 2023-02-10 NOTE — HISTORY OF PRESENT ILLNESS
[de-identified] : She missed on appointment for procrit last week.\par She starts her next cycle on 6/25/18\par C/o back pain radiating down her left which occurred when she bent to  something.\par No change in diarrhea.\par \par 7/31/18:\par Doing well. On Revlimid for more than 2yrs, 5 mg 2 weeks on 1 week off. She will be starting week on tonight. \par C/o diarrhea. On Imodium 2 pills AM and 2 pills PM. Doesn't help much with diarrhea. \par C/o nausea, abdominal pain. \par \par \par Colonoscopy in 2016 was normal. \par Did not have blood transfusion for over 2 years. \par On procrit [id number] weekly. Missed last week on 7/24/18 as she was out of town. She has been non compliant with weekly Procrit.\par Mammogram in 2017 was normal. \par \par 9/11/18\par pt is here for follow up.\par She feels the lomotil helps with the diarrhea.\par She was away for a few weeks and missed her procrit injections.\par No fever, abdominal  discomfort has been less since since use of lomotil.\par She started a new cycle 2 days ago.\par \par 10/2/18:\par She will start Revlimid tonight (week on). 2 weeks on, 1 week off. \par On ASA 81mg. \par Denies fever, nausea, vomiting, chest pain, SOB, abdominal pain, and bladder problems.\par C/o diarrhea. \par S/p 2 units PRBC transfusion on 9/25/18. \par On Procrit 99182 units weekly. Not compliant every week. \par C/o intermittent dizziness. \par \par 10/31/18\par Pt is here for follow up.\par C/O significant fatigue.\par Continues to have diarrhea, takes imodium and lomotil as needed.\par C/o dry cough, no fever.\par Cough started a month ago. It is worse in the mornings however over last week it has been constant all day.\par No chest pain.\par She was found to have low B12 levels and was started on B12 injections.\par \par 11/27/18:\par Doing well. Hospitalized for 3 days for cellulitis/abcess of the back s/p drainage and on Abx currently. Developed 3 days after Mg infusion as per pt. \par Denies nausea, vomiting, chest pain, SOB, abdominal pain, bowel and bladder problems.\par This is the week on Revlimid (week 1).\par S/p 1 unit PRBC transfusion in the hospital. \par On Procrit weekly \par \par 12/27/18:\par Doing well. No major complaints.\par Denies fever, nausea, vomiting, chest pain, SOB, abdominal pain, and bladder problems.\par On Revlimid 5 mg 2 weeks on 1 week off. This is the week off. She will start the week on sunday (12/30/18).\par Due for Procrit 74450 units today. \par Still has diarrhea. 4-5bm/day.\par On monthly B12 injections. \par \par 1/30/19:\par Patient here for follow up for MDS.  She is taking Revlimid 5 mg, 2 weeks on, 1 week off.  She will complete this current cycle on Saturday.  She has no new complaints today.  Patient denies cough, shortness of breath, denies fever, denies bone pain.\par \par 03/04/2019\par Patient is here for a follow up visit. She complaints of severe pain in her left shoulder and has had abscess drained 2 weeks ago. However the pain is worse and she has purulent discharge on examination. She also has Nasal sores that are painful. Culture from 11/2018 showed MRSA.  Denies Fever. \par She also complaints of swelling in her right leg. Not tender and not erythematous and negative Gong;s sign. \par Her diarrhea with Revlimid is ongoing and Imodium and Lomotil helps but not everyday. \par She is on 60,000 units of procrit weekly and Revlimid 5 mg 2 weeks on 1 week off. \par \par 4/23/19\par Pt is here for follow up.\par She feels well.\par The nasal sores have improved with bactroban\par The abscess on left shoulder has resolved.\par However she has a new one on the middle of her back.\par No fever.\par Pt has been on procrit 72403 units weekly, however she missed a dose in between.\par \par 5/8/19\par pt is here for follow up.\par no new complaints.\par feels well.\par Her skin abscess has resolved.\par she has been unable to go to ID, as she could not get an appt.\par No bleeding.\par She is compliant with revlimid.\par \par 6/6/19\par Pt is here for follow up.\par no new complaints \par Feels well.\par Is on week 2 of her Revlimid cycle. \par No transfusions since last visit\par \par 7/17/19\par Pt is here for follow up.\par C/O fatigue.\par Was away for a few days two weeks ago, but missed treatment with procrit for 2 weeks.\par Is complaint with Revlimid 2 weeks on 1 week off.\par Diarrhea is a little improved.\par \par 8/14/19\par Patient here for follow up visit, feeling well.  Although she is complaining of some pain at the left scapula area recently.  Patient denies cough, shortness of breath, denies fever, denies other bone pain.  She is off Revlimid  this week, due to restart on Monday.  She is scheduled for Procrit and Vitamin B12 injection today.  She plans to leave the country tomorrow to visit her mother who is ill.  She will return in about 10 days.\par \par 9/11/19\par Pt is here for follow up.\par She was away for 3 weeks, out of which she took procrit for 2 weeks in [place].\par She missed last week's dose.\par She had CBCs in [place] which showed Hgb of 8.9\par She feels well.\par She still getting diarrhea.\par She has been using lomotil with very minimal relief.\par She started a new cycle of Revlimid 4 days ago.\par \par 10/3/19\par Patient here for follow up visit, feeling fairly well.   Patient denies cough, shortness of breath, denies fever, denies other bone pain.  She remains on Revlimid.  She is scheduled for Procrit and Vitamin B12 injection today.\par \par 10/24/19\par Pt is here for follow up.\par Feels well.\par No SOB, fatigue.\par Compliant with procrit and Revl;imid.\par Remains on ASa.\par Diarrhea is unchanged.\par Pt takes imodium as needed.\par \par 11/14/19\par Pt is here for follow up.\par No new complaints.\par Starts a new cycle of Revlimid today.\par Diarrhea is same as before, no rectal bleeding.\par No fever.\par \par 12/5/19\par Pt is here for follow up.\par No new complaints.\par Denies feeling weaker than usual.\par No fever, SOB.\par No change in frequency of diarrhea.\par No pain.\par Will start next cycle of Revlimid in 3 days.\par \par \par !/16/20\par Pt was recently DCed from [facility] 3 days ago after being treated for pneumonia and MARCO.\par She is on po Levaquin.\par She restarted Revlmid 3 days ago.\par She is feeling better now. No fever.\par No SOB, Chest pain and back pain has resolved.\par Pt has a cough, no expectoration.\par She also recd a unit of PRBCs last week in the hospital\par \par \par 1/30/20\par Patient here for follow up visit, feeling well.  She has no new complaints. Cough is almost gone completely.  Patient denies shortness of breath, denies fever, denies bone pain.  Her appetite is ok, weight is stable.  She is due to restart Revlimid on Monday.\par \par 02/20/20\par Pt is here for follow up, feeling well.\par Offers no new complaints. Denies SOB, fever, chills, weight loss, CP, cough. \par Currently off week of Revlimid 5 mg. Restarts on Monday.\par Has procrit 80,000 units today. Next b 12 injection due in 2 weeks \par Did not get chest Xray\par CBC reviewed WBC 3.1, h/h 8.3/25%, plt 386, neutrophils 1.29\par \par 3/12/20\par Pt is here for follow up.\par She took Revlimid for 2 weeks and then has been off for one week although she was advised to take it daily without break.\par No SOB, Cough, fever.\par Has mild fatigue.\par \par 04/02/2020\par SIMONA KUHN a 75 year F is here today for follow up visit of MDS.\par Denies fever, chills, cough,night sweats, weight loss. \par Denies bleeding or bruising.\par Pt receives procrit 80,000 units weekly and B12 IM monthly. \par Continued Revlimid 5 mg daily x 3 weeks, has 1 dose left tonight. \par CBC reviewed: WBC 3.2, H/H 8.1/25.2, neutrophils 1.6, PLT 72\par \par 4/20/2020: The patient is here for follow up . She has no complaints of fever, chills, dizziness , chest pain and shortness of breath . She is still with diarrhea , up to 10 watery BMs per day, responds poorly to imodium and lomotil. She is on Revlimid 5 mg daily , took last dose yesterday night. Her last Procrit was on April 13. \par \par 5/26/20\par Pt is here for follow up.\par She is feeling well. Denies SOB, chest pain, fever.\par Continues to have diarrhea although she is off of Revlmid.\par Thinks it may due to diabetic meds.\par Wants to discuss BM bx results\par \par 6/22/20\par Pt is here for follow up.\par Feels well. Denies any fever, N, V, D\par Continues to have diarrhea, she will talk to her PCP about changing metformin for DM.\par Has been needing PRBCs 1 unit each month\par \par 7/20/20\par Pt is here for follow up.\par No new complaints. Has been feeling well.\par No SOB, CP. \par No N, V, D.\par She has recd 2 units of PRBCs since May 20.\par \par 8/17/20\par Pt is here for a follow up visit.\par She is feeling well.\par No new symptoms. No N, V, D.\par No bruising or bleeding. She continues to need PRBCs every 2-3 weeks.\par \par 8/31/20\par Pt is here for follow up. She is feeling well. No N, V, D.\par She is tolerating the hydrea well. No SOB, CP\par No bruising ior bleeding\par \par 9/8/20\par Pt is here for follow up.\par She had been contacted by the [facility] last week to advise her to stop the hydrea. Pt did not check her messages and continued with Hydrea.\par No fever, N, V, bleeding.\par Saw Dr Ferrell last week.\par \par 9/14/20\par Pt is here for folow up.\par Besides her usual complaint of diarrhea she is feeling well.\par Recd 1 unit PRBC last week.\par Has stopped Hydrea.\par No fever, mouth sores.\par \par 9/29/20\par Pt is here for folow up.\par No new complaints.\par Is seeing GI for diarrhea net week.\par No fever, night sweats.\par REcd 3 units of PRBC this month\par \par 10/13/20\par Pt is here for follow up.\par No new complaints.\par Has not needed a transfusion since 3 weeks ago.\par Continues to have diarrhea, waiting to be seen by GI\par \par 10/26/20\par Pt is here for follow up.\par C/O feeling fatigued.\par Has CLARK.\par No nausea or vomiting.\par No fever.\par Diarrhea has improved a little. She is no longer on Metformin\par \par 11/9/20\par Pt is here for follow up.\par Feels well. Denies SOB, CP, fatigue\par \par 12/7/20- Patient is for follow up and is due for cycle 9 of Vidaza.  She still has loose BM sometimes 10 times a day and was seeing Dr. Corey from GI- did not follow up recently and is unable to get an appt with him. She has lost about 10 lbs since September. No N/V. No fever or chills. No CP/SOB. She has been getting PRBC transfusion every 3 weeks now\par \par 01/04/20 Pt presented today for f/u visit . She is s/p 8 cycles of vidaza and was started on Luspatercept in Dec , 2020 . So far she received 1 injection , she is due for 2nd injection today . Adverse effect profile was discussed with her in detail , she reports having some dizziness and weakness after first injection . She received blood transfusion last wk . Blood work from today reviewed as well and counts are adequate . She denies any fevers,  chills,  or any new symptoms . \par \par 1/25/21\par Pt is here for follow up.\par C/O diarrhea, otherwise feeling better.\par Has not needed a transfusion since 12/29\par \par 2/16/21\par Pt is here for follow up.\par Needed transfusion on 2/8/21.\par Continues to C/o fatigue. Diarrhea remains the same, has not made GI appt as yet.\par \par 3/8/21\par Pt is here for follow up.\par Feels better today. Recd 1 unit PRBCs last week.\par Diarrhea is same as before. has an appt with GI next week.\par No fever\par \par 3/30/21\par Pt is here for follow up.\par Feels better. Last transfusion was on 3/2/21\par Saw GI and was started on cholestyramine and metformin was DCED with resolution of diarrhea.\par She denies any SOB,\par Fatigue is better\par \par 4/20/2021\par Pt is here to f/u for MDS\par She is s/p Luspatercept cycle #6\par She is compliant with Aspirin\par She feels better today, appetite is good\par She denies shortness of breath, fatigue, or weakness \par She c/o of diarrhea but it has improved since she was started on Cholestyramine. Stool is soft and less in frequency\par She received COVID vaccine x 2 doses\par \par 5/11/21\par pt is here for follow up.\par Feels the same with fatigue, diarrhea.\par No SOB\par \par 6/21/21\par Pt is here for follow up.\par Feels well. No SOB, CP, N< V.\par Diarrhea is better controlled now.\par Last PRBC transfusion was 2 weeks ago.\par \par 7/19/21\par Pt is here for follow up.\par Feels a little tired, last transfusion was 6/1/21.\par No bleeding, fever, SOB\par \par 8/10/2021\par Patient is here to follow up for her MDS.\par She is on Luspatercept, tolerating well.\par She feels well today, the appetite is good.\par She denies shortness of breath, fatigue, fever, night sweats, abdominal pain, arthralgias/myalgias.\par \par 8/31/21\par Pt is here for follow up.\par C/O feeling very fatigued.\par No bleeding.\par No fever.\par \par 9/21/2021\par Patient is here to follow up for MDS.\par She is on Luspatercept and Retacrit, tolerating well.\par She gets blood transfusion as needed for Hgb <7 gm/dL\par She is c/o of fatigue, no shortness of breath, dizziness, chills or lightheadedness.\par She denies melena or hematochezia.\par Her appetite is good, no nausea/vomiting.\par \par 10/28/2021\par Patient is here to follow up for MDS.\par She is on Luspatercept and Retacrit, tolerating well.\par She gets blood transfusion to keep Hgb > 7 gm/dL.\par She is c/o of chronic fatigue, no shortness of breath, dizziness, chills or lightheadedness.\par She denies melena or hematochezia.\par Her appetite is good, no nausea/vomiting.\par She c/o of severe diarrhea, 10-12x/day watery stool while on Imodium in the last 2 weeks.\par Last colonoscopy was in 2016, benign as per patient.\par \par 11/18/21\par Pt is here for follow up. C/O fatigue. No SOB, CP\par \par 12/9/21\par Pt is here for follow up.\par Feels better today. Recd 2 units last week in ER.\par Planning to go to [place] for over a week and does not want to miss her procrit dose.\par Diarrhea is stable,\par \par 12/23/21\par Pt is here for follow up.\par Feels better. No SOB, CP. Going to [place] tomorrow. Has not been able to get Procrit injection from the pharmacy yet d/t insurance issues\par \par 2/3/22\par Pt is here for follow up. Feeling same as usual. No SOB, CP. Last transfusion was 2 weeks ago in ER>\par No bleeding reported\par \par 3/3/22\par Pt is here for follow up.\par C/O fatigue. Had black stools X2\par \par 3/30/22\par Pt is feeling well.\par Here for treatment and BM biopsy\par \par 4/21/22\par Pt is here fir follow up.\par Was in ER last week. Was give 2 units of PRBCs.\par Pt feels less tired today.\par Wants to discuss BM rslts\par \par 5/12/22\par Pt is here today for follow up visit.\par Pt c/o feeling tired.  Hgb=6.9.  One unit transfusion scheduled.\par \par 6/23/22\par Pt is here for follow up. Feels well today. No SOB, chest pain.\par Has not made appt with [facility]\par \par 7/14/22\par Pt is here for follow up for lowrisk myelodysplastic syndrome.\par She came in late today because she was not feeling well this morning- dizziness & weakness.\par She denies SOB, CP.\par \par 9/22/22\par Pt is here for follow up.\par C/O fatigue. was in the ER 3 weeks ago and recd 2 U PRBCS\par \par 11/10/22\par Patient is here to follow up for MDS/MPN.\par S/P 2 units pRBC last weekend, feeling much better now.\par She denies shortness of breath, chest pain or headache.\par \par 1/5/2023\par Patient is here to follow up for MDS/MPN, accompanied by spouse and daughter.\par She is feeling much better after she received blood transfusion on 12/29/22.\par She denies shortness of breath, chest pain, dizziness or headache.\par Pt fell on 12/28/22 when she slipped while getting down the stairs and landed on her right knee. Now c/o of severe right knee pain with limited ROM. She has not been evaluated since the incident happened. She is taking Aleeve with minimal relief.\par \par 1/19/2023\par Patient is here to follow up for MDS/MPN and treatment.\par She feel fine, has fatigue not worse than her baseline.\par She denies shortness of breath, chest pain, dizziness, headache or bleeding.\par She was evaluated at the ER on 1/5/2023 for her fall and imaging showed no fracture, except for mild joint effusion.\par \par 2/9/23\par Patient presents for follow up today. She complains of light-headedness. Her BP is elevated to 180/70 mmHg. She does not take any meds and says she is not diagnosed with HTN. She is due for Procrit & Luspatercept today. Reports her energy level is at baseline. No other complaints today.\par Her CMP showed serum glucose > 600 mg/dl, she was sent to ER with the transport. [de-identified] : This is a 75 year old  female with history of MDS. She's was previously treated with Revlimid 5 mg, 2 weeks on, 1 week off.  \par She is also on Procrit.\par  She underwent colonoscopy (2/2017)  Results noted no colitis, only hyperplastic polyp noted. \par  \par

## 2023-02-10 NOTE — CONSULT NOTE ADULT - ASSESSMENT
77F with PMHx of MDS, DM, HTN, CKD3 sent in to the ED after her visit with heme/onc for hyperkalemia and hyperglycemia. Pt is on PO DM meds and has had uncontrolled blood sugars for a long time. She was found to have hyperkalemia outpatient but pt doesn't remember the value.    - On Glimepiride 4mg BID and Repaglinide 0.5mg TID at home;   - c/w current Insulin regimen for now;   -       Incomplete note; Pending d/w Attending;  77F with PMHx of MDS, DM, HTN, CKD3 sent in to the ED after her visit with heme/onc for hyperkalemia and hyperglycemia. Pt is on PO DM meds and has had uncontrolled blood sugars for a long time. She was found to have hyperkalemia outpatient but pt doesn't remember the value.    - Luspatercept q 3weeks of MDS; follows w/ Dr. Wade OP;   - On Glimepiride 4mg BID and Repaglinide 0.5mg TID at home; Off Metformin due to hx of Met Acidosis;   - c/w current Insulin regimen for now;   -       Incomplete note; Pending d/w Attending;  77F with PMHx of MDS, DM, HTN, CKD3 sent in to the ED after her visit with heme/onc for hyperkalemia and hyperglycemia. Pt is on PO DM meds and has had uncontrolled blood sugars for a long time. She was found to have hyperkalemia outpatient but pt doesn't remember the value.    - Luspatercept q 3weeks of MDS; follows w/ Dr. Wade OP;   - On Glimepiride 4mg BID and Repaglinide 0.5mg TID at home; Off Metformin due to hx of Met Acidosis;   - FS consistently >300 at home;   - c/w current Insulin regimen for now;   - Can dc on Lantus 20 units qAM and Glimeperide 2 mg qAM  - OP fu w/ Dr. Weeks    Case discussed w/ Dr. Weeks;

## 2023-02-10 NOTE — H&P ADULT - NSHPPHYSICALEXAM_GEN_ALL_CORE
General: NAD, AOx3  HEENT: Normocephalic, atraumatic  Lungs: Normal breath sounds, no wheezes/crackles  Heart: s1s2, no mrg  Abdomen: Soft, NT/ND. No rigidity/guarding  Extremities: Peripheral pulses +1, no cyanosis/edema  Neuro: Grossly normal motor exam, no focal deficits  Psych: AOx3, normal affect  Skin: No rashes or bruises

## 2023-02-11 LAB
A1C WITH ESTIMATED AVERAGE GLUCOSE RESULT: 8.2 % — HIGH (ref 4–5.6)
ALBUMIN SERPL ELPH-MCNC: 3.7 G/DL — SIGNIFICANT CHANGE UP (ref 3.5–5.2)
ALP SERPL-CCNC: 138 U/L — HIGH (ref 30–115)
ALT FLD-CCNC: 25 U/L — SIGNIFICANT CHANGE UP (ref 0–41)
ANION GAP SERPL CALC-SCNC: 11 MMOL/L — SIGNIFICANT CHANGE UP (ref 7–14)
ANION GAP SERPL CALC-SCNC: 9 MMOL/L — SIGNIFICANT CHANGE UP (ref 7–14)
AST SERPL-CCNC: 23 U/L — SIGNIFICANT CHANGE UP (ref 0–41)
BASOPHILS # BLD AUTO: 0.05 K/UL — SIGNIFICANT CHANGE UP (ref 0–0.2)
BASOPHILS NFR BLD AUTO: 0.9 % — SIGNIFICANT CHANGE UP (ref 0–1)
BILIRUB SERPL-MCNC: 0.9 MG/DL — SIGNIFICANT CHANGE UP (ref 0.2–1.2)
BUN SERPL-MCNC: 26 MG/DL — HIGH (ref 10–20)
BUN SERPL-MCNC: 28 MG/DL — HIGH (ref 10–20)
CALCIUM SERPL-MCNC: 8.9 MG/DL — SIGNIFICANT CHANGE UP (ref 8.4–10.4)
CALCIUM SERPL-MCNC: 8.9 MG/DL — SIGNIFICANT CHANGE UP (ref 8.4–10.5)
CHLORIDE SERPL-SCNC: 100 MMOL/L — SIGNIFICANT CHANGE UP (ref 98–110)
CHLORIDE SERPL-SCNC: 101 MMOL/L — SIGNIFICANT CHANGE UP (ref 98–110)
CO2 SERPL-SCNC: 23 MMOL/L — SIGNIFICANT CHANGE UP (ref 17–32)
CO2 SERPL-SCNC: 27 MMOL/L — SIGNIFICANT CHANGE UP (ref 17–32)
CREAT SERPL-MCNC: 1.4 MG/DL — SIGNIFICANT CHANGE UP (ref 0.7–1.5)
CREAT SERPL-MCNC: 1.5 MG/DL — SIGNIFICANT CHANGE UP (ref 0.7–1.5)
CULTURE RESULTS: SIGNIFICANT CHANGE UP
EGFR: 36 ML/MIN/1.73M2 — LOW
EGFR: 39 ML/MIN/1.73M2 — LOW
EOSINOPHIL # BLD AUTO: 0.13 K/UL — SIGNIFICANT CHANGE UP (ref 0–0.7)
EOSINOPHIL NFR BLD AUTO: 2.5 % — SIGNIFICANT CHANGE UP (ref 0–8)
ESTIMATED AVERAGE GLUCOSE: 189 MG/DL — HIGH (ref 68–114)
GLUCOSE BLDC GLUCOMTR-MCNC: 125 MG/DL — HIGH (ref 70–99)
GLUCOSE BLDC GLUCOMTR-MCNC: 170 MG/DL — HIGH (ref 70–99)
GLUCOSE BLDC GLUCOMTR-MCNC: 270 MG/DL — HIGH (ref 70–99)
GLUCOSE SERPL-MCNC: 158 MG/DL — HIGH (ref 70–99)
GLUCOSE SERPL-MCNC: 192 MG/DL — HIGH (ref 70–99)
HCT VFR BLD CALC: 23.5 % — LOW (ref 37–47)
HGB BLD-MCNC: 7.8 G/DL — LOW (ref 12–16)
IMM GRANULOCYTES NFR BLD AUTO: 1.1 % — HIGH (ref 0.1–0.3)
LYMPHOCYTES # BLD AUTO: 1.35 K/UL — SIGNIFICANT CHANGE UP (ref 1.2–3.4)
LYMPHOCYTES # BLD AUTO: 25.5 % — SIGNIFICANT CHANGE UP (ref 20.5–51.1)
MAGNESIUM SERPL-MCNC: 1.9 MG/DL — SIGNIFICANT CHANGE UP (ref 1.8–2.4)
MCHC RBC-ENTMCNC: 29.5 PG — SIGNIFICANT CHANGE UP (ref 27–31)
MCHC RBC-ENTMCNC: 33.2 G/DL — SIGNIFICANT CHANGE UP (ref 32–37)
MCV RBC AUTO: 89 FL — SIGNIFICANT CHANGE UP (ref 81–99)
MONOCYTES # BLD AUTO: 0.57 K/UL — SIGNIFICANT CHANGE UP (ref 0.1–0.6)
MONOCYTES NFR BLD AUTO: 10.8 % — HIGH (ref 1.7–9.3)
NEUTROPHILS # BLD AUTO: 3.14 K/UL — SIGNIFICANT CHANGE UP (ref 1.4–6.5)
NEUTROPHILS NFR BLD AUTO: 59.2 % — SIGNIFICANT CHANGE UP (ref 42.2–75.2)
NRBC # BLD: 0 /100 WBCS — SIGNIFICANT CHANGE UP (ref 0–0)
PH UR: 5.8 — SIGNIFICANT CHANGE UP (ref 5–8)
PLATELET # BLD AUTO: 330 K/UL — SIGNIFICANT CHANGE UP (ref 130–400)
POTASSIUM SERPL-MCNC: 4.6 MMOL/L — SIGNIFICANT CHANGE UP (ref 3.5–5)
POTASSIUM SERPL-MCNC: 4.9 MMOL/L — SIGNIFICANT CHANGE UP (ref 3.5–5)
POTASSIUM SERPL-SCNC: 4.6 MMOL/L — SIGNIFICANT CHANGE UP (ref 3.5–5)
POTASSIUM SERPL-SCNC: 4.9 MMOL/L — SIGNIFICANT CHANGE UP (ref 3.5–5)
PROT SERPL-MCNC: 5.7 G/DL — LOW (ref 6–8)
RBC # BLD: 2.64 M/UL — LOW (ref 4.2–5.4)
RBC # FLD: 17.2 % — HIGH (ref 11.5–14.5)
SODIUM SERPL-SCNC: 134 MMOL/L — LOW (ref 135–146)
SODIUM SERPL-SCNC: 137 MMOL/L — SIGNIFICANT CHANGE UP (ref 135–146)
SPECIMEN SOURCE: SIGNIFICANT CHANGE UP
WBC # BLD: 5.3 K/UL — SIGNIFICANT CHANGE UP (ref 4.8–10.8)
WBC # FLD AUTO: 5.3 K/UL — SIGNIFICANT CHANGE UP (ref 4.8–10.8)

## 2023-02-11 PROCEDURE — 99233 SBSQ HOSP IP/OBS HIGH 50: CPT

## 2023-02-11 RX ORDER — NIFEDIPINE 30 MG
30 TABLET, EXTENDED RELEASE 24 HR ORAL ONCE
Refills: 0 | Status: COMPLETED | OUTPATIENT
Start: 2023-02-11 | End: 2023-02-11

## 2023-02-11 RX ORDER — NIFEDIPINE 30 MG
60 TABLET, EXTENDED RELEASE 24 HR ORAL DAILY
Refills: 0 | Status: DISCONTINUED | OUTPATIENT
Start: 2023-02-12 | End: 2023-02-14

## 2023-02-11 RX ADMIN — Medication 5 UNIT(S): at 17:08

## 2023-02-11 RX ADMIN — PANTOPRAZOLE SODIUM 40 MILLIGRAM(S): 20 TABLET, DELAYED RELEASE ORAL at 06:47

## 2023-02-11 RX ADMIN — Medication 81 MILLIGRAM(S): at 11:16

## 2023-02-11 RX ADMIN — Medication 2: at 07:49

## 2023-02-11 RX ADMIN — Medication 30 MILLIGRAM(S): at 05:43

## 2023-02-11 RX ADMIN — Medication 5 UNIT(S): at 07:48

## 2023-02-11 RX ADMIN — Medication 6: at 12:05

## 2023-02-11 RX ADMIN — INSULIN GLARGINE 15 UNIT(S): 100 INJECTION, SOLUTION SUBCUTANEOUS at 07:49

## 2023-02-11 RX ADMIN — HEPARIN SODIUM 5000 UNIT(S): 5000 INJECTION INTRAVENOUS; SUBCUTANEOUS at 05:43

## 2023-02-11 RX ADMIN — HEPARIN SODIUM 5000 UNIT(S): 5000 INJECTION INTRAVENOUS; SUBCUTANEOUS at 14:07

## 2023-02-11 RX ADMIN — Medication 5 UNIT(S): at 12:06

## 2023-02-11 RX ADMIN — SODIUM CHLORIDE 60 MILLILITER(S): 9 INJECTION INTRAMUSCULAR; INTRAVENOUS; SUBCUTANEOUS at 05:43

## 2023-02-11 RX ADMIN — Medication 30 MILLIGRAM(S): at 07:48

## 2023-02-11 NOTE — PROGRESS NOTE ADULT - ASSESSMENT
A/P:  #uncontrolled DM  -endocrine following  -hba1c 8.2  -long h/o hyperglycemia  -was on oral DM medications prior to admission---will dc home on lantus 20 u daily and glimepiride 2 mg qam  -outpatient endo f/u---outpatient diabetic screening including optho and podiatry  -nutrition/diabetic teaching    #hypertensive urgency--uncontrolled HTN  -cont to monitor bp  -started on nifedipine 30 mg xL daily--->will increase to 60 mg xl daily- consider additing additional agent (would add hctz 25 mg daily or amlodipine 5 mg daily for additional bp coverage if needed)---no ace or arb 2/2 MARCO/Hyperkalemia (but readdress as otuaptient)  - tolerating adeq oral intake---dc ivf    #Myelodysplastic Syndrome  -f/u with heme as outpatient  -monitor cbc (hb 7.8)---transfuse if Hb <7  -transfusion q3 weeks  -active t/s     #Hyperkalemia- resolved  -K 6.2----->4.9 today  -continue to monitor bmp  -EKG no peaked t waves  -if k remains stable and no ekg changes can dc tele    DVT/GI ppx  guarded prognosis    FULL CODE    anticipate likely dc home with hc in next 24-48 hrs pending adequate bp and fs control- case managment aware of possible weekend dc  A/P:  #uncontrolled DM  -endocrine following  -hba1c 8.2  -long h/o hyperglycemia  -was on oral DM medications prior to admission---will dc home on lantus 20 u daily and glimepiride 2 mg qam  -outpatient endo f/u---outpatient diabetic screening including optho and podiatry  -nutrition/diabetic teaching    #hypertensive urgency--uncontrolled HTN  -cont to monitor bp  -started on nifedipine 30 mg xL daily--->will increase to 60 mg xl daily- consider additing additional agent (would add hctz 25 mg daily or amlodipine 5 mg daily for additional bp coverage if needed)---no ace or arb 2/2 MARCO/Hyperkalemia (but readdress as otuaptient)  - tolerating adeq oral intake---dc ivf    #Myelodysplastic Syndrome  -f/u with heme as outpatient  -monitor cbc (hb 7.8)---transfuse if Hb <7  -transfusion q3 weeks  -active t/s     #Hyperkalemia- resolved  -K 6.2----->4.9 today  -continue to monitor bmp  -EKG no peaked t waves  -if k remains stable and no ekg changes can dc tele    DVT/GI ppx  guarded prognosis    FULL CODE    anticipate likely dc home with hc in next 24-48 hrs pending adequate bp and fs control- case managment aware of possible weekend dc     Total time spent to complete patient's bedside assessment, review medical chart, discuss medical plan of care with covering medical team was more than 35 minutes  with >50% of time spendt face to face with patient, discussion with patient/family and/or coordination of care

## 2023-02-11 NOTE — PROGRESS NOTE ADULT - SUBJECTIVE AND OBJECTIVE BOX
Patient is a 77y old  Female who presents with a chief complaint of Hyperglycemia, Hyperkalemia (10 Feb 2023 16:14)    HPI:  77F with PMHx of MDS, DM, HTN, CKD3 sent in to the ED after her visit with heme/onc for hyperkalemia and hyperglycemia. Pt is on PO DM meds and has had uncontrolled blood sugars for a long time. She was found to have hyperkalemia outpatient but pt doesn't remember the value. She also has uncontrolled diabetes, and was found to have FS > 500 today after eating cheesecake. Pt denies any symptoms of hyper or hypoglycemia and has never had hypoglycemic episodes. She follows with Dr. Wade for management of MDS and is on transfusions around e8pyane. She endorses chronic diarrhea 2/2 side effects from MDS treatment with a baseline of 3-5 BMs per day. Pt denies any CP, SOB, headache, blurry vision, palpitations, abd pain, N/V/C, or any  symptoms.    in the ED: /85, HR 78, T 98F, RR 18 satting 100% on RA. Labs notable for Hb 8 (chronic at baseline), Na 130, K 5.7, glucose 474. Cr 1.5 (baseline 1.2). VBG shows pH 7.31, pCO2 50, lactate 4.3 -> 2.8. UA negative for ketones, BHB -ve. EKG NSR no hyperkalemic changes. Pt given insulin, calcium, and lokelma in ED, admitted to medicine.  (10 Feb 2023 05:38)    PAST MEDICAL & SURGICAL HISTORY:  DM (diabetes mellitus)  MDS (myelodysplastic syndrome)      patient seen and examined independently on morning rounds for the first time today, chart reviewed and discussed with the medicine resident and on interdisciplinary rounds.    bp still remains high and fs still elevated- patient will need to be dc with new insulin (lantus daily)--patient was on insulin at home years ago but will get HC and diabetic education prior to dc    anticipate likely dc home in am if bp and fs better controlled    Vital Signs Last 24 Hrs  T(C): 36.2 (2023 07:56), Max: 37.1 (2023 00:45)  T(F): 97.2 (2023 07:56), Max: 98.7 (2023 00:45)  HR: 95 (2023 12:00) (87 - 104)  BP: 137/60 (2023 12:00) (137/60 - 190/83)  BP(mean): 87 (2023 12:00) (87 - 87)  RR: 18 (2023 10:26) (18 - 18)  SpO2: 98% (2023 10:26) (97% - 98%)    Parameters below as of 2023 05:47  Patient On (Oxygen Delivery Method): room air    PE:  GEN-NAD, AAOx3  PULM- Clear to auscultation bilaterally, fair air entry  CVS- +s1/s2 RRR no murmurs  GI- soft NT ND +bs, no rebound, no guarding  EXT- no edema  SKIN- no rashes/no lesions                            7.8    5.30  )-----------( 330      ( 2023 06:30 )             23.5     02-11    137  |  101  |  28<H>  ----------------------------<  158<H>  4.9   |  27  |  1.4    Ca    8.9      2023 06:30  Mg     1.9     02-11    TPro  5.7<L>  /  Alb  3.7  /  TBili  0.9  /  DBili  x   /  AST  23  /  ALT  25  /  AlkPhos  138<H>  02-11        Urinalysis Basic - ( 10 Feb 2023 15:29 )    Color: Yellow / Appearance: Slightly Turbid / S.024 / pH: x  Gluc: x / Ketone: Negative  / Bili: Negative / Urobili: <2 mg/dL   Blood: x / Protein: 30 mg/dL / Nitrite: Negative   Leuk Esterase: Negative / RBC: 2 /HPF / WBC 7 /HPF   Sq Epi: x / Non Sq Epi: 18 /HPF / Bacteria: Moderate        Culture - Urine (collected 2023 20:25)  Source: Clean Catch Clean Catch (Midstream)  Final Report (2023 07:37):    <10,000 CFU/mL Normal Urogenital Jacqueline        MEDICATIONS  (STANDING):  aspirin enteric coated 81 milliGRAM(s) Oral daily  dextrose 5%. 1000 milliLiter(s) (100 mL/Hr) IV Continuous <Continuous>  dextrose 5%. 1000 milliLiter(s) (50 mL/Hr) IV Continuous <Continuous>  dextrose 50% Injectable 25 Gram(s) IV Push once  dextrose 50% Injectable 12.5 Gram(s) IV Push once  dextrose 50% Injectable 25 Gram(s) IV Push once  glucagon  Injectable 1 milliGRAM(s) IntraMuscular once  heparin   Injectable 5000 Unit(s) SubCutaneous every 8 hours  influenza  Vaccine (HIGH DOSE) 0.7 milliLiter(s) IntraMuscular once  insulin glargine Injectable (LANTUS) 15 Unit(s) SubCutaneous every morning  insulin lispro (ADMELOG) corrective regimen sliding scale   SubCutaneous three times a day before meals  insulin lispro Injectable (ADMELOG) 5 Unit(s) SubCutaneous three times a day before meals  pantoprazole    Tablet 40 milliGRAM(s) Oral before breakfast

## 2023-02-12 LAB
ALBUMIN SERPL ELPH-MCNC: 3.5 G/DL — SIGNIFICANT CHANGE UP (ref 3.5–5.2)
ALBUMIN SERPL ELPH-MCNC: 3.7 G/DL — SIGNIFICANT CHANGE UP (ref 3.5–5.2)
ALP SERPL-CCNC: 166 U/L — HIGH (ref 30–115)
ALP SERPL-CCNC: 168 U/L — HIGH (ref 30–115)
ALT FLD-CCNC: 120 U/L — HIGH (ref 0–41)
ALT FLD-CCNC: 123 U/L — HIGH (ref 0–41)
ANION GAP SERPL CALC-SCNC: 10 MMOL/L — SIGNIFICANT CHANGE UP (ref 7–14)
AST SERPL-CCNC: 146 U/L — HIGH (ref 0–41)
AST SERPL-CCNC: 149 U/L — HIGH (ref 0–41)
BASOPHILS # BLD AUTO: 0.04 K/UL — SIGNIFICANT CHANGE UP (ref 0–0.2)
BASOPHILS NFR BLD AUTO: 0.9 % — SIGNIFICANT CHANGE UP (ref 0–1)
BILIRUB DIRECT SERPL-MCNC: 0.3 MG/DL — SIGNIFICANT CHANGE UP (ref 0–0.3)
BILIRUB INDIRECT FLD-MCNC: 0.7 MG/DL — SIGNIFICANT CHANGE UP (ref 0.2–1.2)
BILIRUB SERPL-MCNC: 1 MG/DL — SIGNIFICANT CHANGE UP (ref 0.2–1.2)
BILIRUB SERPL-MCNC: 1 MG/DL — SIGNIFICANT CHANGE UP (ref 0.2–1.2)
BUN SERPL-MCNC: 26 MG/DL — HIGH (ref 10–20)
CALCIUM SERPL-MCNC: 9 MG/DL — SIGNIFICANT CHANGE UP (ref 8.4–10.5)
CHLORIDE SERPL-SCNC: 106 MMOL/L — SIGNIFICANT CHANGE UP (ref 98–110)
CO2 SERPL-SCNC: 24 MMOL/L — SIGNIFICANT CHANGE UP (ref 17–32)
CREAT SERPL-MCNC: 1.3 MG/DL — SIGNIFICANT CHANGE UP (ref 0.7–1.5)
EGFR: 43 ML/MIN/1.73M2 — LOW
EOSINOPHIL # BLD AUTO: 0.13 K/UL — SIGNIFICANT CHANGE UP (ref 0–0.7)
EOSINOPHIL NFR BLD AUTO: 3 % — SIGNIFICANT CHANGE UP (ref 0–8)
GLUCOSE BLDC GLUCOMTR-MCNC: 105 MG/DL — HIGH (ref 70–99)
GLUCOSE BLDC GLUCOMTR-MCNC: 125 MG/DL — HIGH (ref 70–99)
GLUCOSE BLDC GLUCOMTR-MCNC: 154 MG/DL — HIGH (ref 70–99)
GLUCOSE BLDC GLUCOMTR-MCNC: 226 MG/DL — HIGH (ref 70–99)
GLUCOSE BLDC GLUCOMTR-MCNC: 245 MG/DL — HIGH (ref 70–99)
GLUCOSE SERPL-MCNC: 90 MG/DL — SIGNIFICANT CHANGE UP (ref 70–99)
HCT VFR BLD CALC: 23.4 % — LOW (ref 37–47)
HGB BLD-MCNC: 7.9 G/DL — LOW (ref 12–16)
IMM GRANULOCYTES NFR BLD AUTO: 1.6 % — HIGH (ref 0.1–0.3)
LYMPHOCYTES # BLD AUTO: 1.21 K/UL — SIGNIFICANT CHANGE UP (ref 1.2–3.4)
LYMPHOCYTES # BLD AUTO: 27.9 % — SIGNIFICANT CHANGE UP (ref 20.5–51.1)
MAGNESIUM SERPL-MCNC: 1.8 MG/DL — SIGNIFICANT CHANGE UP (ref 1.8–2.4)
MCHC RBC-ENTMCNC: 29.3 PG — SIGNIFICANT CHANGE UP (ref 27–31)
MCHC RBC-ENTMCNC: 33.8 G/DL — SIGNIFICANT CHANGE UP (ref 32–37)
MCV RBC AUTO: 86.7 FL — SIGNIFICANT CHANGE UP (ref 81–99)
MONOCYTES # BLD AUTO: 0.51 K/UL — SIGNIFICANT CHANGE UP (ref 0.1–0.6)
MONOCYTES NFR BLD AUTO: 11.8 % — HIGH (ref 1.7–9.3)
NEUTROPHILS # BLD AUTO: 2.38 K/UL — SIGNIFICANT CHANGE UP (ref 1.4–6.5)
NEUTROPHILS NFR BLD AUTO: 54.8 % — SIGNIFICANT CHANGE UP (ref 42.2–75.2)
NRBC # BLD: 0 /100 WBCS — SIGNIFICANT CHANGE UP (ref 0–0)
PLATELET # BLD AUTO: 336 K/UL — SIGNIFICANT CHANGE UP (ref 130–400)
POTASSIUM SERPL-MCNC: 4.7 MMOL/L — SIGNIFICANT CHANGE UP (ref 3.5–5)
POTASSIUM SERPL-SCNC: 4.7 MMOL/L — SIGNIFICANT CHANGE UP (ref 3.5–5)
PROT SERPL-MCNC: 5.3 G/DL — LOW (ref 6–8)
PROT SERPL-MCNC: 5.4 G/DL — LOW (ref 6–8)
RBC # BLD: 2.7 M/UL — LOW (ref 4.2–5.4)
RBC # FLD: 17.2 % — HIGH (ref 11.5–14.5)
SODIUM SERPL-SCNC: 140 MMOL/L — SIGNIFICANT CHANGE UP (ref 135–146)
WBC # BLD: 4.34 K/UL — LOW (ref 4.8–10.8)
WBC # FLD AUTO: 4.34 K/UL — LOW (ref 4.8–10.8)

## 2023-02-12 PROCEDURE — 99233 SBSQ HOSP IP/OBS HIGH 50: CPT

## 2023-02-12 RX ADMIN — PANTOPRAZOLE SODIUM 40 MILLIGRAM(S): 20 TABLET, DELAYED RELEASE ORAL at 05:01

## 2023-02-12 RX ADMIN — HEPARIN SODIUM 5000 UNIT(S): 5000 INJECTION INTRAVENOUS; SUBCUTANEOUS at 11:45

## 2023-02-12 RX ADMIN — Medication 60 MILLIGRAM(S): at 05:01

## 2023-02-12 RX ADMIN — INSULIN GLARGINE 15 UNIT(S): 100 INJECTION, SOLUTION SUBCUTANEOUS at 10:35

## 2023-02-12 RX ADMIN — Medication 5 UNIT(S): at 08:15

## 2023-02-12 RX ADMIN — Medication 5 UNIT(S): at 16:55

## 2023-02-12 RX ADMIN — Medication 81 MILLIGRAM(S): at 11:45

## 2023-02-12 RX ADMIN — Medication 5 UNIT(S): at 11:44

## 2023-02-12 RX ADMIN — Medication 4: at 11:44

## 2023-02-12 NOTE — PROGRESS NOTE ADULT - SUBJECTIVE AND OBJECTIVE BOX
SIMONA KHUN  77y  Female      Patient is a 77y old  Female who presents with a chief complaint of Hyperglycemia, Hyperkalemia (2023 15:04)      INTERVAL HPI/OVERNIGHT EVENTS:  Patient feels ok, she denies any pain, no fever.   Vital Signs Last 24 Hrs  T(C): 37 (2023 04:49), Max: 37 (2023 18:20)  T(F): 98.6 (2023 04:49), Max: 98.6 (2023 18:20)  HR: 88 (2023 07:40) (86 - 99)  BP: 146/67 (2023 07:40) (123/68 - 180/73)  BP(mean): 87 (2023 12:00) (87 - 87)  RR: 18 (2023 04:49) (18 - 18)  SpO2: 97% (2023 20:19) (95% - 99%)    Parameters below as of 2023 19:42  Patient On (Oxygen Delivery Method): room air                Consultant(s) Notes Reviewed:  [x ] YES  [ ] NO          MEDICATIONS  (STANDING):  aspirin enteric coated 81 milliGRAM(s) Oral daily  dextrose 5%. 1000 milliLiter(s) (100 mL/Hr) IV Continuous <Continuous>  dextrose 5%. 1000 milliLiter(s) (50 mL/Hr) IV Continuous <Continuous>  dextrose 50% Injectable 25 Gram(s) IV Push once  dextrose 50% Injectable 12.5 Gram(s) IV Push once  dextrose 50% Injectable 25 Gram(s) IV Push once  glucagon  Injectable 1 milliGRAM(s) IntraMuscular once  heparin   Injectable 5000 Unit(s) SubCutaneous every 8 hours  influenza  Vaccine (HIGH DOSE) 0.7 milliLiter(s) IntraMuscular once  insulin glargine Injectable (LANTUS) 15 Unit(s) SubCutaneous every morning  insulin lispro (ADMELOG) corrective regimen sliding scale   SubCutaneous three times a day before meals  insulin lispro Injectable (ADMELOG) 5 Unit(s) SubCutaneous three times a day before meals  NIFEdipine XL 60 milliGRAM(s) Oral daily  pantoprazole    Tablet 40 milliGRAM(s) Oral before breakfast    MEDICATIONS  (PRN):  acetaminophen     Tablet .. 650 milliGRAM(s) Oral every 6 hours PRN Temp greater or equal to 38C (100.4F), Mild Pain (1 - 3)  dextrose Oral Gel 15 Gram(s) Oral once PRN Blood Glucose LESS THAN 70 milliGRAM(s)/deciliter  melatonin 5 milliGRAM(s) Oral at bedtime PRN Insomnia      LABS                          7.9    4.34  )-----------( 336      ( 2023 07:32 )             23.4     0212    140  |  106  |  26<H>  ----------------------------<  90  4.7   |  24  |  1.3    Ca    9.0      2023 07:32  Mg     1.8         TPro  5.3<L>  /  Alb  3.7  /  TBili  1.0  /  DBili  x   /  AST  146<H>  /  ALT  120<H>  /  AlkPhos  168<H>        Urinalysis Basic - ( 10 Feb 2023 15:29 )    Color: Yellow / Appearance: Slightly Turbid / S.024 / pH: x  Gluc: x / Ketone: Negative  / Bili: Negative / Urobili: <2 mg/dL   Blood: x / Protein: 30 mg/dL / Nitrite: Negative   Leuk Esterase: Negative / RBC: 2 /HPF / WBC 7 /HPF   Sq Epi: x / Non Sq Epi: 18 /HPF / Bacteria: Moderate        Lactate Trend  02-10 @ 00:55 Lactate:2.8         CAPILLARY BLOOD GLUCOSE      POCT Blood Glucose.: 245 mg/dL (2023 10:32)      Culture - Urine (collected 23 @ 20:25)  Source: Clean Catch Clean Catch (Midstream)  Final Report (23 @ 07:37):    <10,000 CFU/mL Normal Urogenital Jacqueline        RADIOLOGY & ADDITIONAL TESTS:    Imaging Personally Reviewed:  [ ] YES  [ ] NO    HEALTH ISSUES - PROBLEM Dx:          PHYSICAL EXAM:  GENERAL: NAD, well-developed.  HEAD:  Atraumatic, Normocephalic.  EYES: EOMI, PERRLA, conjunctiva and sclera clear.  NECK: Supple, No JVD.  CHEST/LUNG: Clear to auscultation bilaterally; No wheeze.  HEART: Regular rate and rhythm; S1 S2.   ABDOMEN: Soft, Nontender, Nondistended; Bowel sounds present.  EXTREMITIES:  2+ Peripheral Pulses, No clubbing, cyanosis, or edema.  PSYCH: AAOx3.  NEUROLOGY: non-focal.  SKIN: No rashes or lesions.

## 2023-02-12 NOTE — PROGRESS NOTE ADULT - ASSESSMENT
77F with PMH of HTN, DM 2, CKD 3 and MDS sent in to the ED after her visit with heme/onc for hyperkalemia and hyperglycemia. Pt is on PO DM meds and has had uncontrolled blood sugars for a long time. She was found to have hyperkalemia and FS > 500. She endorses chronic diarrhea 2/2 side effects from MDS treatment with a baseline of 3-5 BMs per day. In the ED: /85, HR 78, T 98F, RR 18 satting 100% on RA. Labs notable for Hb 8 (chronic at baseline), Na 130, K 5.7, glucose 474. Cr 1.5 (baseline 1.2). VBG shows pH 7.31, pCO2 50, lactate 4.3 -> 2.8. UA negative for ketones, BHB -ve. EKG NSR no hyperkalemic changes. Pt given insulin, calcium, and lokelma in ED, admitted to medicine.   Patient was started on IV fluid, Lantus, FS improved. BP improved.     A/P:   Elevated LFTs:   No symptoms.   Check Abdomen US.     Uncontrolled DM type 2:   A1C 8.8  Seen by Endocrinology, recommended to discharge on Lantus 20 units AM and Glimepiride 2mg daily   Diabetic diet.     Hyperkalemia: improved.   low potassium diet.     CKD stage 3:   Cr 1.3 stable.     Hypertensive Urgency:   BP improved.   Patient with hyperkalemia. Hold on ACEI for hypertension   Continue Nifedipine 60mg po daily.     Myelodysplastic syndrome  Follows with Dr Wade  Receives transfusions every 3 weeks and has chronic diarrhea 2/2 treatment  Hb 8.2 at baseline  Outpt heme/onc follow up    DVT Prophylaxis: Heparin SC.   Pending: improving LFTs.

## 2023-02-13 LAB
ALBUMIN SERPL ELPH-MCNC: 3.6 G/DL — SIGNIFICANT CHANGE UP (ref 3.5–5.2)
ALP SERPL-CCNC: 155 U/L — HIGH (ref 30–115)
ALT FLD-CCNC: 74 U/L — HIGH (ref 0–41)
ANION GAP SERPL CALC-SCNC: 10 MMOL/L — SIGNIFICANT CHANGE UP (ref 7–14)
AST SERPL-CCNC: 49 U/L — HIGH (ref 0–41)
BASOPHILS # BLD AUTO: 0.06 K/UL — SIGNIFICANT CHANGE UP (ref 0–0.2)
BASOPHILS NFR BLD AUTO: 1.3 % — HIGH (ref 0–1)
BILIRUB SERPL-MCNC: 0.9 MG/DL — SIGNIFICANT CHANGE UP (ref 0.2–1.2)
BUN SERPL-MCNC: 26 MG/DL — HIGH (ref 10–20)
CALCIUM SERPL-MCNC: 8.9 MG/DL — SIGNIFICANT CHANGE UP (ref 8.4–10.5)
CHLORIDE SERPL-SCNC: 107 MMOL/L — SIGNIFICANT CHANGE UP (ref 98–110)
CO2 SERPL-SCNC: 24 MMOL/L — SIGNIFICANT CHANGE UP (ref 17–32)
CREAT SERPL-MCNC: 1.2 MG/DL — SIGNIFICANT CHANGE UP (ref 0.7–1.5)
EGFR: 47 ML/MIN/1.73M2 — LOW
EOSINOPHIL # BLD AUTO: 0.12 K/UL — SIGNIFICANT CHANGE UP (ref 0–0.7)
EOSINOPHIL NFR BLD AUTO: 2.5 % — SIGNIFICANT CHANGE UP (ref 0–8)
GLUCOSE BLDC GLUCOMTR-MCNC: 137 MG/DL — HIGH (ref 70–99)
GLUCOSE BLDC GLUCOMTR-MCNC: 207 MG/DL — HIGH (ref 70–99)
GLUCOSE BLDC GLUCOMTR-MCNC: 230 MG/DL — HIGH (ref 70–99)
GLUCOSE BLDC GLUCOMTR-MCNC: 232 MG/DL — HIGH (ref 70–99)
GLUCOSE BLDC GLUCOMTR-MCNC: 60 MG/DL — LOW (ref 70–99)
GLUCOSE SERPL-MCNC: 42 MG/DL — CRITICAL LOW (ref 70–99)
HCT VFR BLD CALC: 23.8 % — LOW (ref 37–47)
HGB BLD-MCNC: 7.7 G/DL — LOW (ref 12–16)
IMM GRANULOCYTES NFR BLD AUTO: 2.1 % — HIGH (ref 0.1–0.3)
LYMPHOCYTES # BLD AUTO: 1.09 K/UL — LOW (ref 1.2–3.4)
LYMPHOCYTES # BLD AUTO: 23 % — SIGNIFICANT CHANGE UP (ref 20.5–51.1)
MAGNESIUM SERPL-MCNC: 1.6 MG/DL — LOW (ref 1.8–2.4)
MCHC RBC-ENTMCNC: 29.4 PG — SIGNIFICANT CHANGE UP (ref 27–31)
MCHC RBC-ENTMCNC: 32.4 G/DL — SIGNIFICANT CHANGE UP (ref 32–37)
MCV RBC AUTO: 90.8 FL — SIGNIFICANT CHANGE UP (ref 81–99)
MONOCYTES # BLD AUTO: 0.67 K/UL — HIGH (ref 0.1–0.6)
MONOCYTES NFR BLD AUTO: 14.2 % — HIGH (ref 1.7–9.3)
NEUTROPHILS # BLD AUTO: 2.69 K/UL — SIGNIFICANT CHANGE UP (ref 1.4–6.5)
NEUTROPHILS NFR BLD AUTO: 56.9 % — SIGNIFICANT CHANGE UP (ref 42.2–75.2)
NRBC # BLD: 0 /100 WBCS — SIGNIFICANT CHANGE UP (ref 0–0)
PLATELET # BLD AUTO: 408 K/UL — HIGH (ref 130–400)
POTASSIUM SERPL-MCNC: 4.6 MMOL/L — SIGNIFICANT CHANGE UP (ref 3.5–5)
POTASSIUM SERPL-SCNC: 4.6 MMOL/L — SIGNIFICANT CHANGE UP (ref 3.5–5)
PROT SERPL-MCNC: 5.2 G/DL — LOW (ref 6–8)
RBC # BLD: 2.62 M/UL — LOW (ref 4.2–5.4)
RBC # FLD: 17.5 % — HIGH (ref 11.5–14.5)
SODIUM SERPL-SCNC: 141 MMOL/L — SIGNIFICANT CHANGE UP (ref 135–146)
WBC # BLD: 4.73 K/UL — LOW (ref 4.8–10.8)
WBC # FLD AUTO: 4.73 K/UL — LOW (ref 4.8–10.8)

## 2023-02-13 PROCEDURE — 99232 SBSQ HOSP IP/OBS MODERATE 35: CPT

## 2023-02-13 PROCEDURE — 76705 ECHO EXAM OF ABDOMEN: CPT | Mod: 26

## 2023-02-13 RX ORDER — MAGNESIUM SULFATE 500 MG/ML
2 VIAL (ML) INJECTION
Refills: 0 | Status: DISCONTINUED | OUTPATIENT
Start: 2023-02-13 | End: 2023-02-13

## 2023-02-13 RX ORDER — MAGNESIUM OXIDE 400 MG ORAL TABLET 241.3 MG
400 TABLET ORAL
Refills: 0 | Status: DISCONTINUED | OUTPATIENT
Start: 2023-02-13 | End: 2023-02-14

## 2023-02-13 RX ADMIN — HEPARIN SODIUM 5000 UNIT(S): 5000 INJECTION INTRAVENOUS; SUBCUTANEOUS at 14:13

## 2023-02-13 RX ADMIN — MAGNESIUM OXIDE 400 MG ORAL TABLET 400 MILLIGRAM(S): 241.3 TABLET ORAL at 14:13

## 2023-02-13 RX ADMIN — Medication 5 UNIT(S): at 11:57

## 2023-02-13 RX ADMIN — INSULIN GLARGINE 15 UNIT(S): 100 INJECTION, SOLUTION SUBCUTANEOUS at 08:00

## 2023-02-13 RX ADMIN — Medication 4: at 11:57

## 2023-02-13 RX ADMIN — MAGNESIUM OXIDE 400 MG ORAL TABLET 400 MILLIGRAM(S): 241.3 TABLET ORAL at 11:56

## 2023-02-13 RX ADMIN — Medication 60 MILLIGRAM(S): at 05:08

## 2023-02-13 RX ADMIN — PANTOPRAZOLE SODIUM 40 MILLIGRAM(S): 20 TABLET, DELAYED RELEASE ORAL at 05:08

## 2023-02-13 RX ADMIN — Medication 5 UNIT(S): at 17:08

## 2023-02-13 RX ADMIN — Medication 81 MILLIGRAM(S): at 11:57

## 2023-02-13 RX ADMIN — MAGNESIUM OXIDE 400 MG ORAL TABLET 400 MILLIGRAM(S): 241.3 TABLET ORAL at 17:07

## 2023-02-13 NOTE — PROGRESS NOTE ADULT - SUBJECTIVE AND OBJECTIVE BOX
SIMONA KUHN  77y Female    INTERVAL HPI/OVERNIGHT EVENTS:    Pt admits to not eating dinner last night  FS 60 this morning - she was given juice with improvement  lantus given today  FS now in 200 range  denies SOB, pain, N/V  no fever    T(F): 97.9 (02-13-23 @ 04:03), Max: 98.2 (02-12-23 @ 21:37)  HR: 80 (02-13-23 @ 04:03) (80 - 94)  BP: 156/67 (02-13-23 @ 04:03) (130/60 - 156/67)  RR: 18 (02-13-23 @ 04:03) (18 - 18)    CAPILLARY BLOOD GLUCOSE      POCT Blood Glucose.: 207 mg/dL (13 Feb 2023 11:39)  POCT Blood Glucose.: 232 mg/dL (13 Feb 2023 09:46)  POCT Blood Glucose.: 60 mg/dL (13 Feb 2023 07:51)  POCT Blood Glucose.: 154 mg/dL (12 Feb 2023 21:20)  POCT Blood Glucose.: 125 mg/dL (12 Feb 2023 16:50)        PHYSICAL EXAM:  GENERAL: NAD  HEAD:  Normocephalic  EYES:  conjunctiva and sclera clear  ENMT: Moist mucous membranes  NERVOUS SYSTEM:  Alert, awake, Good concentration  CHEST/LUNG: CTA b/l; No rales, rhonchi, wheezing  HEART: Regular rate and rhythm  ABDOMEN: Soft, Nontender, Nondistended; Bowel sounds present  EXTREMITIES:   No edema  SKIN: warm, dry    Consultant(s) Notes Reviewed:  [x ] YES  [ ] NO  Care Discussed with Consultants/Other Providers [ x] YES  [ ] NO    MEDICATIONS  (STANDING):  aspirin enteric coated 81 milliGRAM(s) Oral daily  dextrose 5%. 1000 milliLiter(s) (100 mL/Hr) IV Continuous <Continuous>  dextrose 5%. 1000 milliLiter(s) (50 mL/Hr) IV Continuous <Continuous>  dextrose 50% Injectable 25 Gram(s) IV Push once  dextrose 50% Injectable 12.5 Gram(s) IV Push once  dextrose 50% Injectable 25 Gram(s) IV Push once  glucagon  Injectable 1 milliGRAM(s) IntraMuscular once  heparin   Injectable 5000 Unit(s) SubCutaneous every 8 hours  influenza  Vaccine (HIGH DOSE) 0.7 milliLiter(s) IntraMuscular once  insulin glargine Injectable (LANTUS) 15 Unit(s) SubCutaneous every morning  insulin lispro (ADMELOG) corrective regimen sliding scale   SubCutaneous three times a day before meals  insulin lispro Injectable (ADMELOG) 5 Unit(s) SubCutaneous three times a day before meals  magnesium oxide 400 milliGRAM(s) Oral three times a day with meals  NIFEdipine XL 60 milliGRAM(s) Oral daily  pantoprazole    Tablet 40 milliGRAM(s) Oral before breakfast    MEDICATIONS  (PRN):  acetaminophen     Tablet .. 650 milliGRAM(s) Oral every 6 hours PRN Temp greater or equal to 38C (100.4F), Mild Pain (1 - 3)  dextrose Oral Gel 15 Gram(s) Oral once PRN Blood Glucose LESS THAN 70 milliGRAM(s)/deciliter  melatonin 5 milliGRAM(s) Oral at bedtime PRN Insomnia      LABS:                        7.7    4.73  )-----------( 408      ( 13 Feb 2023 06:45 )             23.8     02-13    141  |  107  |  26<H>  ----------------------------<  42<LL>  4.6   |  24  |  1.2    Ca    8.9      13 Feb 2023 06:45  Mg     1.6     02-13    TPro  5.2<L>  /  Alb  3.6  /  TBili  0.9  /  DBili  x   /  AST  49<H>  /  ALT  74<H>  /  AlkPhos  155<H>  02-13          RADIOLOGY & ADDITIONAL TESTS:    Imaging or report Personally Reviewed:  [x] YES  [ ] NO    < from: US Abdomen Upper Quadrant Right (02.13.23 @ 07:44) >  IMPRESSION:  Cholelithiasis, gallbladder sludge, without sonographic evidence of   cholecystitis.  No biliary dilation.    < end of copied text >      < from: Xray Chest 1 View AP/PA (02.10.23 @ 11:18) >  Impression:    Improving bilateral opacities.    < end of copied text >      Case discussed with residents and RN on rounds today    Care discussed with pt

## 2023-02-13 NOTE — PROGRESS NOTE ADULT - SUBJECTIVE AND OBJECTIVE BOX
SIMONA KUHN 77y Female  MRN#: [id number]     Hospital Day: 4d    Pt is currently admitted with the primary diagnosis of uncontrolled hyperglycemia and hyperkalemia    SUBJECTIVE                                                ----------------------------------------------------------  OBJECTIVE  PAST MEDICAL & SURGICAL HISTORY  DM (diabetes mellitus)    MDS (myelodysplastic syndrome)    No significant past surgical history                                              -----------------------------------------------------------  ALLERGIES:  No Known Allergies                                            ------------------------------------------------------------    HOME MEDICATIONS  Home Medications:  Aspir 81 oral delayed release tablet: 1 tab(s) orally once a day (10 Feb 2023 05:38)  glimepiride 4 mg oral tablet: 1 tab(s) orally 2 times a day (10 Feb 2023 05:38)  repaglinide 0.5 mg oral tablet: 1 tab(s) orally 3 times a day (before meals) (10 Feb 2023 05:38)                           MEDICATIONS:  STANDING MEDICATIONS  aspirin enteric coated 81 milliGRAM(s) Oral daily  dextrose 5%. 1000 milliLiter(s) IV Continuous <Continuous>  dextrose 5%. 1000 milliLiter(s) IV Continuous <Continuous>  dextrose 50% Injectable 25 Gram(s) IV Push once  dextrose 50% Injectable 12.5 Gram(s) IV Push once  dextrose 50% Injectable 25 Gram(s) IV Push once  glucagon  Injectable 1 milliGRAM(s) IntraMuscular once  heparin   Injectable 5000 Unit(s) SubCutaneous every 8 hours  influenza  Vaccine (HIGH DOSE) 0.7 milliLiter(s) IntraMuscular once  insulin glargine Injectable (LANTUS) 15 Unit(s) SubCutaneous every morning  insulin lispro (ADMELOG) corrective regimen sliding scale   SubCutaneous three times a day before meals  insulin lispro Injectable (ADMELOG) 5 Unit(s) SubCutaneous three times a day before meals  NIFEdipine XL 60 milliGRAM(s) Oral daily  pantoprazole    Tablet 40 milliGRAM(s) Oral before breakfast    PRN MEDICATIONS  acetaminophen     Tablet .. 650 milliGRAM(s) Oral every 6 hours PRN  dextrose Oral Gel 15 Gram(s) Oral once PRN  melatonin 5 milliGRAM(s) Oral at bedtime PRN                                            ------------------------------------------------------------  VITAL SIGNS: Last 24 Hours  Vital Signs Last 24 Hrs  T(C): 36.6 (13 Feb 2023 04:03), Max: 36.8 (12 Feb 2023 21:37)  T(F): 97.9 (13 Feb 2023 04:03), Max: 98.2 (12 Feb 2023 21:37)  HR: 80 (13 Feb 2023 04:03) (80 - 94)  BP: 156/67 (13 Feb 2023 04:03) (130/60 - 156/67)  BP(mean): --  RR: 18 (13 Feb 2023 04:03) (18 - 18)  SpO2: --                                             --------------------------------------------------------------  LABS:                        7.9    4.34  )-----------( 336      ( 12 Feb 2023 07:32 )             23.4     02-12    140  |  106  |  26<H>  ----------------------------<  90  4.7   |  24  |  1.3    Ca    9.0      12 Feb 2023 07:32  Mg     1.8     02-12    TPro  5.4<L>  /  Alb  3.5  /  TBili  1.0  /  DBili  0.3  /  AST  149<H>  /  ALT  123<H>  /  AlkPhos  166<H>  02-12                                                              -------------------------------------------------------------  RADIOLOGY:                                            --------------------------------------------------------------    PHYSICAL EXAM:  GENERAL: NAD, well-developed.  HEAD:  Atraumatic, Normocephalic.  EYES: EOMI, PERRLA, conjunctiva and sclera clear.  NECK: Supple, No JVD.  CHEST/LUNG: Clear to auscultation bilaterally; No wheeze.  HEART: Regular rate and rhythm; S1 S2.   ABDOMEN: Soft, Nontender, Nondistended; Bowel sounds present.  EXTREMITIES:  2+ Peripheral Pulses, No clubbing, cyanosis, or edema.  PSYCH: AAOx3.  NEUROLOGY: non-focal.  SKIN: No rashes or lesions.                                           --------------------------------------------------------------    ASSESSMENT & PLAN    77F with PMHx of MDS, DM, HTN, CKD3 sent in to the ED after her visit with heme/onc for hyperkalemia and hyperglycemia. Pt is on PO DM meds and has had uncontrolled blood sugars for a long time.     # Elevated LFTs  # Chronic ALP elevation   - >168>166  - AST 23>146>149   -ALT 24>120>123  - RUQ U/S: cholelithiasis, GB sludge without sonographic evidence of cholecystitis  - trend LFTs    # Uncontrolled type 2 DM with hyperglycemia  - Pt on home Glimeperide and Repaglinide   - Has had uncontrolled FS for a long time, found to have FS > 500 during outpt heme/onc appt  - Glucose 498 -> 409 s/p insulin 10u in ED  - UA negative for ketones, BHB -ve  - A1c 8.2  - Start lantus/lispro 15/5/5/5 + ISS  - DASH/CC diet  - Seen by Endocrinology, recommended to discharge on Lantus 20 units AM and Glimepiride 2mg daily    # Mild MARCO on CKD3, stable Cr 1.3  # Hyperkalemia, resolved  # Pseudohyponatremia  - hold ACE-I  - Baseline Cr 1.2, here with 1.5, BUN 26  - K 5.7 (not hemolyzed), pt received insulin 10, lokelma 10, and Ca gluconate  - EKG NSR with no hyperkalemic changes  - Lactate 4.3 -> 2.8, trend  - Encourage PO intake    # HTN  # HTN Urgency, imrpoved  - not on any home meds  - /85 -> 196/77 in ED, pt asymptomatic  - Start nifedipine 30mg XL > increased to Nifedipine 60 XL 2/12    # Myelodysplastic syndrome  - Follows with Dr Wade, sent in from office today  - Receives transfusions every 3 weeks and has chronic diarrhea 2/2 treatment  - Hb stable around 8  - Outpt heme/onc follow up    DVT ppx: subq hep  GI ppx: Protonix  Diet: DASH/TLC  Activity: IAT  Dispo: Acute       SIMONA KUHN 77y Female  MRN#: [id number]     Hospital Day: 4d    Pt is currently admitted with the primary diagnosis of uncontrolled hyperglycemia and hyperkalemia    SUBJECTIVE                                                ----------------------------------------------------------  OBJECTIVE  PAST MEDICAL & SURGICAL HISTORY  DM (diabetes mellitus)    MDS (myelodysplastic syndrome)    No significant past surgical history                                              -----------------------------------------------------------  ALLERGIES:  No Known Allergies                                            ------------------------------------------------------------    HOME MEDICATIONS  Home Medications:  Aspir 81 oral delayed release tablet: 1 tab(s) orally once a day (10 Feb 2023 05:38)  glimepiride 4 mg oral tablet: 1 tab(s) orally 2 times a day (10 Feb 2023 05:38)  repaglinide 0.5 mg oral tablet: 1 tab(s) orally 3 times a day (before meals) (10 Feb 2023 05:38)                           MEDICATIONS:  STANDING MEDICATIONS  aspirin enteric coated 81 milliGRAM(s) Oral daily  dextrose 5%. 1000 milliLiter(s) IV Continuous <Continuous>  dextrose 5%. 1000 milliLiter(s) IV Continuous <Continuous>  dextrose 50% Injectable 25 Gram(s) IV Push once  dextrose 50% Injectable 12.5 Gram(s) IV Push once  dextrose 50% Injectable 25 Gram(s) IV Push once  glucagon  Injectable 1 milliGRAM(s) IntraMuscular once  heparin   Injectable 5000 Unit(s) SubCutaneous every 8 hours  influenza  Vaccine (HIGH DOSE) 0.7 milliLiter(s) IntraMuscular once  insulin glargine Injectable (LANTUS) 15 Unit(s) SubCutaneous every morning  insulin lispro (ADMELOG) corrective regimen sliding scale   SubCutaneous three times a day before meals  insulin lispro Injectable (ADMELOG) 5 Unit(s) SubCutaneous three times a day before meals  NIFEdipine XL 60 milliGRAM(s) Oral daily  pantoprazole    Tablet 40 milliGRAM(s) Oral before breakfast    PRN MEDICATIONS  acetaminophen     Tablet .. 650 milliGRAM(s) Oral every 6 hours PRN  dextrose Oral Gel 15 Gram(s) Oral once PRN  melatonin 5 milliGRAM(s) Oral at bedtime PRN                                            ------------------------------------------------------------  VITAL SIGNS: Last 24 Hours  Vital Signs Last 24 Hrs  T(C): 36.6 (13 Feb 2023 04:03), Max: 36.8 (12 Feb 2023 21:37)  T(F): 97.9 (13 Feb 2023 04:03), Max: 98.2 (12 Feb 2023 21:37)  HR: 80 (13 Feb 2023 04:03) (80 - 94)  BP: 156/67 (13 Feb 2023 04:03) (130/60 - 156/67)  BP(mean): --  RR: 18 (13 Feb 2023 04:03) (18 - 18)  SpO2: --                                             --------------------------------------------------------------  LABS:                        7.9    4.34  )-----------( 336      ( 12 Feb 2023 07:32 )             23.4     02-12    140  |  106  |  26<H>  ----------------------------<  90  4.7   |  24  |  1.3    Ca    9.0      12 Feb 2023 07:32  Mg     1.8     02-12    TPro  5.4<L>  /  Alb  3.5  /  TBili  1.0  /  DBili  0.3  /  AST  149<H>  /  ALT  123<H>  /  AlkPhos  166<H>  02-12                                                              -------------------------------------------------------------  RADIOLOGY:                                            --------------------------------------------------------------    PHYSICAL EXAM:  GENERAL: NAD, well-developed.  HEAD:  Atraumatic, Normocephalic.  EYES: EOMI, PERRLA, conjunctiva and sclera clear.  NECK: Supple, No JVD.  CHEST/LUNG: Clear to auscultation bilaterally; No wheeze.  HEART: Regular rate and rhythm; S1 S2.   ABDOMEN: Soft, Nontender, Nondistended; Bowel sounds present.  EXTREMITIES:  2+ Peripheral Pulses, No clubbing, cyanosis, or edema.  PSYCH: AAOx3.  NEUROLOGY: non-focal.  SKIN: No rashes or lesions.                                           --------------------------------------------------------------    ASSESSMENT & PLAN    77F with PMHx of MDS, DM, HTN, CKD3 sent in to the ED after her visit with heme/onc for hyperkalemia and hyperglycemia. Pt is on PO DM meds and has had uncontrolled blood sugars for a long time.     # Elevated LFTs  # Chronic ALP elevation   - >168>166  - AST 23>146>149  - ALT 24>120>123  - direct bili 0.3, indirect bili 0.7  - RUQ U/S: cholelithiasis, GB sludge without sonographic evidence of cholecystitis  - trend LFTs    # Uncontrolled type 2 DM with hyperglycemia  - Pt on home Glimeperide and Repaglinide   - Has had uncontrolled FS for a long time, found to have FS > 500 during outpt heme/onc appt  - Glucose 498 -> 409 s/p insulin 10u in ED  - UA negative for ketones, BHB -ve  - A1c 8.2  - Start lantus/lispro 15/5/5/5 + ISS  - DASH/CC diet  - Seen by Endocrinology, recommended to discharge on Lantus 20 units AM and Glimepiride 2mg daily    # Mild MARCO on CKD3, stable Cr 1.3  # Hyperkalemia, resolved  # Pseudohyponatremia  - hold ACE-I  - Baseline Cr 1.2, here with 1.5, BUN 26  - K 5.7 (not hemolyzed), pt received insulin 10, lokelma 10, and Ca gluconate  - EKG NSR with no hyperkalemic changes  - Lactate 4.3 -> 2.8, trend  - Encourage PO intake    # HTN  # HTN Urgency, imrpoved  - not on any home meds  - /85 -> 196/77 in ED, pt asymptomatic  - Start nifedipine 30mg XL > increased to Nifedipine 60 XL 2/12    # Myelodysplastic syndrome  - Follows with Dr Wade, sent in from office today  - Receives transfusions every 3 weeks and has chronic diarrhea 2/2 treatment  - Hb stable around 8  - Outpt heme/onc follow up    DVT ppx: subq hep  GI ppx: Protonix  Diet: DASH/TLC  Activity: IAT  Dispo: Acute       SIMONA KUHN 77y Female  MRN#: [id number]     Hospital Day: 4d    Pt is currently admitted with the primary diagnosis of uncontrolled hyperglycemia and hyperkalemia    SUBJECTIVE    Patient seen and examined at bedside. Resting comfortably in NAD. Reports feeling much better. No acute compaints                                            ----------------------------------------------------------  OBJECTIVE  PAST MEDICAL & SURGICAL HISTORY  DM (diabetes mellitus)    MDS (myelodysplastic syndrome)    No significant past surgical history                                              -----------------------------------------------------------  ALLERGIES:  No Known Allergies                                            ------------------------------------------------------------    HOME MEDICATIONS  Home Medications:  Aspir 81 oral delayed release tablet: 1 tab(s) orally once a day (10 Feb 2023 05:38)  glimepiride 4 mg oral tablet: 1 tab(s) orally 2 times a day (10 Feb 2023 05:38)  repaglinide 0.5 mg oral tablet: 1 tab(s) orally 3 times a day (before meals) (10 Feb 2023 05:38)                           MEDICATIONS:  STANDING MEDICATIONS  aspirin enteric coated 81 milliGRAM(s) Oral daily  dextrose 5%. 1000 milliLiter(s) IV Continuous <Continuous>  dextrose 5%. 1000 milliLiter(s) IV Continuous <Continuous>  dextrose 50% Injectable 25 Gram(s) IV Push once  dextrose 50% Injectable 12.5 Gram(s) IV Push once  dextrose 50% Injectable 25 Gram(s) IV Push once  glucagon  Injectable 1 milliGRAM(s) IntraMuscular once  heparin   Injectable 5000 Unit(s) SubCutaneous every 8 hours  influenza  Vaccine (HIGH DOSE) 0.7 milliLiter(s) IntraMuscular once  insulin glargine Injectable (LANTUS) 15 Unit(s) SubCutaneous every morning  insulin lispro (ADMELOG) corrective regimen sliding scale   SubCutaneous three times a day before meals  insulin lispro Injectable (ADMELOG) 5 Unit(s) SubCutaneous three times a day before meals  NIFEdipine XL 60 milliGRAM(s) Oral daily  pantoprazole    Tablet 40 milliGRAM(s) Oral before breakfast    PRN MEDICATIONS  acetaminophen     Tablet .. 650 milliGRAM(s) Oral every 6 hours PRN  dextrose Oral Gel 15 Gram(s) Oral once PRN  melatonin 5 milliGRAM(s) Oral at bedtime PRN                                            ------------------------------------------------------------  VITAL SIGNS: Last 24 Hours  Vital Signs Last 24 Hrs  T(C): 36.6 (13 Feb 2023 04:03), Max: 36.8 (12 Feb 2023 21:37)  T(F): 97.9 (13 Feb 2023 04:03), Max: 98.2 (12 Feb 2023 21:37)  HR: 80 (13 Feb 2023 04:03) (80 - 94)  BP: 156/67 (13 Feb 2023 04:03) (130/60 - 156/67)  BP(mean): --  RR: 18 (13 Feb 2023 04:03) (18 - 18)  SpO2: --                                             --------------------------------------------------------------  LABS:                        7.9    4.34  )-----------( 336      ( 12 Feb 2023 07:32 )             23.4     02-12    140  |  106  |  26<H>  ----------------------------<  90  4.7   |  24  |  1.3    Ca    9.0      12 Feb 2023 07:32  Mg     1.8     02-12    TPro  5.4<L>  /  Alb  3.5  /  TBili  1.0  /  DBili  0.3  /  AST  149<H>  /  ALT  123<H>  /  AlkPhos  166<H>  02-12                                                              -------------------------------------------------------------  RADIOLOGY:                                            --------------------------------------------------------------    PHYSICAL EXAM:  GENERAL: NAD, well-developed.  HEAD:  Atraumatic, Normocephalic.  EYES: EOMI, PERRLA, conjunctiva and sclera clear.  NECK: Supple, No JVD.  CHEST/LUNG: Clear to auscultation bilaterally; No wheeze.  HEART: Regular rate and rhythm; S1 S2.   ABDOMEN: Soft, mild TTP in RUQ, Nondistended; Bowel sounds present.  EXTREMITIES:  2+ Peripheral Pulses, No clubbing, cyanosis, or edema.  PSYCH: AAOx3.  NEUROLOGY: non-focal.  SKIN: No rashes or lesions.                                           --------------------------------------------------------------    ASSESSMENT & PLAN    77F with PMHx of MDS, DM, HTN, CKD3 sent in to the ED after her visit with heme/onc for hyperkalemia and hyperglycemia. Pt is on PO DM meds and has had uncontrolled blood sugars for a long time.     # Elevated LFTs  # Chronic ALP elevation   - >168>166  - AST 23>146>149  - ALT 24>120>123  - direct bili 0.3, indirect bili 0.7  - pt has mild TTP in RUQ  - RUQ U/S: cholelithiasis, GB sludge without sonographic evidence of cholecystitis  - trend LFTs    # Uncontrolled type 2 DM with hyperglycemia  - Pt on home Glimeperide and Repaglinide   - Has had uncontrolled FS for a long time, found to have FS > 500 during outpt heme/onc appt  - Glucose 498 -> 409 s/p insulin 10u in ED  - UA negative for ketones, BHB -ve  - A1c 8.2  - Start lantus/lispro 15/5/5/5 + ISS  - DASH/CC diet  - Seen by Endocrinology, recommended to discharge on Lantus 20 units AM and Glimepiride 2mg daily    # Mild MARCO on CKD3, stable Cr 1.3  # Hyperkalemia, resolved  # Pseudohyponatremia  - hold ACE-I  - Baseline Cr 1.2, here with 1.5, BUN 26  - K 5.7 (not hemolyzed), pt received insulin 10, lokelma 10, and Ca gluconate  - EKG NSR with no hyperkalemic changes  - Lactate 4.3 -> 2.8, trend  - Encourage PO intake    # HTN  # HTN Urgency, imrpoved  - not on any home meds  - /85 -> 196/77 in ED, pt asymptomatic  - Start nifedipine 30mg XL > increased to Nifedipine 60 XL 2/12    # Myelodysplastic syndrome  - Follows with Dr Wade, sent in from office today  - Receives transfusions every 3 weeks and has chronic diarrhea 2/2 treatment  - Hb stable around 8  - Outpt heme/onc follow up    DVT ppx: subq hep  GI ppx: Protonix  Diet: DASH/TLC  Activity: IAT  Dispo: Acute       SIMONA KUHN 77y Female  MRN#: [id number]     Hospital Day: 4d    Pt is currently admitted with the primary diagnosis of uncontrolled hyperglycemia and hyperkalemia    SUBJECTIVE    Patient seen and examined at bedside. Resting comfortably in NAD. Reports feeling much better. No acute compaints                                            ----------------------------------------------------------  OBJECTIVE  PAST MEDICAL & SURGICAL HISTORY  DM (diabetes mellitus)    MDS (myelodysplastic syndrome)    No significant past surgical history                                              -----------------------------------------------------------  ALLERGIES:  No Known Allergies                                            ------------------------------------------------------------    HOME MEDICATIONS  Home Medications:  Aspir 81 oral delayed release tablet: 1 tab(s) orally once a day (10 Feb 2023 05:38)  glimepiride 4 mg oral tablet: 1 tab(s) orally 2 times a day (10 Feb 2023 05:38)  repaglinide 0.5 mg oral tablet: 1 tab(s) orally 3 times a day (before meals) (10 Feb 2023 05:38)                           MEDICATIONS:  STANDING MEDICATIONS  aspirin enteric coated 81 milliGRAM(s) Oral daily  dextrose 5%. 1000 milliLiter(s) IV Continuous <Continuous>  dextrose 5%. 1000 milliLiter(s) IV Continuous <Continuous>  dextrose 50% Injectable 25 Gram(s) IV Push once  dextrose 50% Injectable 12.5 Gram(s) IV Push once  dextrose 50% Injectable 25 Gram(s) IV Push once  glucagon  Injectable 1 milliGRAM(s) IntraMuscular once  heparin   Injectable 5000 Unit(s) SubCutaneous every 8 hours  influenza  Vaccine (HIGH DOSE) 0.7 milliLiter(s) IntraMuscular once  insulin glargine Injectable (LANTUS) 15 Unit(s) SubCutaneous every morning  insulin lispro (ADMELOG) corrective regimen sliding scale   SubCutaneous three times a day before meals  insulin lispro Injectable (ADMELOG) 5 Unit(s) SubCutaneous three times a day before meals  NIFEdipine XL 60 milliGRAM(s) Oral daily  pantoprazole    Tablet 40 milliGRAM(s) Oral before breakfast    PRN MEDICATIONS  acetaminophen     Tablet .. 650 milliGRAM(s) Oral every 6 hours PRN  dextrose Oral Gel 15 Gram(s) Oral once PRN  melatonin 5 milliGRAM(s) Oral at bedtime PRN                                            ------------------------------------------------------------  VITAL SIGNS: Last 24 Hours  Vital Signs Last 24 Hrs  T(C): 36.6 (13 Feb 2023 04:03), Max: 36.8 (12 Feb 2023 21:37)  T(F): 97.9 (13 Feb 2023 04:03), Max: 98.2 (12 Feb 2023 21:37)  HR: 80 (13 Feb 2023 04:03) (80 - 94)  BP: 156/67 (13 Feb 2023 04:03) (130/60 - 156/67)  BP(mean): --  RR: 18 (13 Feb 2023 04:03) (18 - 18)  SpO2: --                                             --------------------------------------------------------------  LABS:                        7.9    4.34  )-----------( 336      ( 12 Feb 2023 07:32 )             23.4     02-12    140  |  106  |  26<H>  ----------------------------<  90  4.7   |  24  |  1.3    Ca    9.0      12 Feb 2023 07:32  Mg     1.8     02-12    TPro  5.4<L>  /  Alb  3.5  /  TBili  1.0  /  DBili  0.3  /  AST  149<H>  /  ALT  123<H>  /  AlkPhos  166<H>  02-12                                                              -------------------------------------------------------------  RADIOLOGY:                                            --------------------------------------------------------------    PHYSICAL EXAM:  GENERAL: NAD, well-developed.  HEAD:  Atraumatic, Normocephalic.  EYES: EOMI, PERRLA, conjunctiva and sclera clear.  NECK: Supple, No JVD.  CHEST/LUNG: Clear to auscultation bilaterally; No wheeze.  HEART: Regular rate and rhythm; S1 S2.   ABDOMEN: Soft, mild TTP in RUQ, Nondistended; Bowel sounds present.  EXTREMITIES:  2+ Peripheral Pulses, No clubbing, cyanosis, or edema.  PSYCH: AAOx3.  NEUROLOGY: non-focal.  SKIN: No rashes or lesions.                                           --------------------------------------------------------------    ASSESSMENT & PLAN    77F with PMHx of MDS, DM, HTN, CKD3 sent in to the ED after her visit with heme/onc for hyperkalemia and hyperglycemia. Pt is on PO DM meds and has had uncontrolled blood sugars for a long time.     # Elevated LFTs  # Chronic ALP elevation   - >168>166  - AST 23>146>149  - ALT 24>120>123  - direct bili 0.3, indirect bili 0.7  - pt has mild TTP in RUQ  - RUQ U/S: cholelithiasis, GB sludge without sonographic evidence of cholecystitis  - trend LFTs    # Uncontrolled type 2 DM with hyperglycemia  # Hypoglycemia episode   - Pt on home Glimeperide and Repaglinide   - Has had uncontrolled FS for a long time, found to have FS > 500 during outpt heme/onc appt  - Glucose 498 -> 409 s/p insulin 10u in ED  - UA negative for ketones, BHB -ve  - A1c 8.2  - Start lantus/lispro 15/5/5/5 + ISS  - DASH/CC diet  - Seen by Endocrinology, recommended to discharge on Lantus 20 units AM and Glimepiride 2mg daily    # Mild MARCO on CKD3, stable Cr 1.3  # Hyperkalemia, resolved  # Pseudohyponatremia  - hold ACE-I  - Baseline Cr 1.2, here with 1.5, BUN 26  - K 5.7 (not hemolyzed), pt received insulin 10, lokelma 10, and Ca gluconate  - EKG NSR with no hyperkalemic changes  - Lactate 4.3 -> 2.8, trend  - Encourage PO intake    # HTN  # HTN Urgency, imrpoved  - not on any home meds  - /85 -> 196/77 in ED, pt asymptomatic  - Start nifedipine 30mg XL > increased to Nifedipine 60 XL 2/12    # Myelodysplastic syndrome  - Follows with Dr Wade, sent in from office today  - Receives transfusions every 3 weeks and has chronic diarrhea 2/2 treatment  - Hb stable around 8  - Outpt heme/onc follow up    DVT ppx: subq hep  GI ppx: Protonix  Diet: DASH/TLC  Activity: IAT  Dispo: Acute       SIMONA KUHN 77y Female  MRN#: [id number]     Hospital Day: 4d    Pt is currently admitted with the primary diagnosis of uncontrolled hyperglycemia and hyperkalemia    SUBJECTIVE    Patient seen and examined at bedside. Resting comfortably in NAD. Reports feeling much better. No acute compaints                                            ----------------------------------------------------------  OBJECTIVE  PAST MEDICAL & SURGICAL HISTORY  DM (diabetes mellitus)    MDS (myelodysplastic syndrome)    No significant past surgical history                                              -----------------------------------------------------------  ALLERGIES:  No Known Allergies                                            ------------------------------------------------------------    HOME MEDICATIONS  Home Medications:  Aspir 81 oral delayed release tablet: 1 tab(s) orally once a day (10 Feb 2023 05:38)  glimepiride 4 mg oral tablet: 1 tab(s) orally 2 times a day (10 Feb 2023 05:38)  repaglinide 0.5 mg oral tablet: 1 tab(s) orally 3 times a day (before meals) (10 Feb 2023 05:38)                           MEDICATIONS:  STANDING MEDICATIONS  aspirin enteric coated 81 milliGRAM(s) Oral daily  dextrose 5%. 1000 milliLiter(s) IV Continuous <Continuous>  dextrose 5%. 1000 milliLiter(s) IV Continuous <Continuous>  dextrose 50% Injectable 25 Gram(s) IV Push once  dextrose 50% Injectable 12.5 Gram(s) IV Push once  dextrose 50% Injectable 25 Gram(s) IV Push once  glucagon  Injectable 1 milliGRAM(s) IntraMuscular once  heparin   Injectable 5000 Unit(s) SubCutaneous every 8 hours  influenza  Vaccine (HIGH DOSE) 0.7 milliLiter(s) IntraMuscular once  insulin glargine Injectable (LANTUS) 15 Unit(s) SubCutaneous every morning  insulin lispro (ADMELOG) corrective regimen sliding scale   SubCutaneous three times a day before meals  insulin lispro Injectable (ADMELOG) 5 Unit(s) SubCutaneous three times a day before meals  NIFEdipine XL 60 milliGRAM(s) Oral daily  pantoprazole    Tablet 40 milliGRAM(s) Oral before breakfast    PRN MEDICATIONS  acetaminophen     Tablet .. 650 milliGRAM(s) Oral every 6 hours PRN  dextrose Oral Gel 15 Gram(s) Oral once PRN  melatonin 5 milliGRAM(s) Oral at bedtime PRN                                            ------------------------------------------------------------  VITAL SIGNS: Last 24 Hours  Vital Signs Last 24 Hrs  T(C): 36.6 (13 Feb 2023 04:03), Max: 36.8 (12 Feb 2023 21:37)  T(F): 97.9 (13 Feb 2023 04:03), Max: 98.2 (12 Feb 2023 21:37)  HR: 80 (13 Feb 2023 04:03) (80 - 94)  BP: 156/67 (13 Feb 2023 04:03) (130/60 - 156/67)  BP(mean): --  RR: 18 (13 Feb 2023 04:03) (18 - 18)  SpO2: --                                             --------------------------------------------------------------  LABS:                        7.9    4.34  )-----------( 336      ( 12 Feb 2023 07:32 )             23.4     02-12    140  |  106  |  26<H>  ----------------------------<  90  4.7   |  24  |  1.3    Ca    9.0      12 Feb 2023 07:32  Mg     1.8     02-12    TPro  5.4<L>  /  Alb  3.5  /  TBili  1.0  /  DBili  0.3  /  AST  149<H>  /  ALT  123<H>  /  AlkPhos  166<H>  02-12                                                              -------------------------------------------------------------  RADIOLOGY:                                            --------------------------------------------------------------    PHYSICAL EXAM:  GENERAL: NAD, well-developed.  HEAD:  Atraumatic, Normocephalic.  EYES: EOMI, PERRLA, conjunctiva and sclera clear.  NECK: Supple, No JVD.  CHEST/LUNG: Clear to auscultation bilaterally; No wheeze.  HEART: Regular rate and rhythm; S1 S2.   ABDOMEN: Soft, mild TTP in RUQ, Nondistended; Bowel sounds present.  EXTREMITIES:  2+ Peripheral Pulses, No clubbing, cyanosis, or edema.  PSYCH: AAOx3.  NEUROLOGY: non-focal.  SKIN: No rashes or lesions.                                           --------------------------------------------------------------    ASSESSMENT & PLAN    77F with PMHx of MDS, DM, HTN, CKD3 sent in to the ED after her visit with heme/onc for hyperkalemia and hyperglycemia. Pt is on PO DM meds and has had uncontrolled blood sugars for a long time.     # Elevated LFTs  # Chronic ALP elevation   - >168>166  - AST 23>146>149  - ALT 24>120>123  - direct bili 0.3, indirect bili 0.7  - pt has mild TTP in RUQ  - RUQ U/S: cholelithiasis, GB sludge without sonographic evidence of cholecystitis  - trend LFTs    # Uncontrolled type 2 DM with hyperglycemia  # Hypoglycemia episode   - Pt on home Glimeperide and Repaglinide   - Has had uncontrolled FS for a long time, found to have FS > 500 during outpt heme/onc appt  - Glucose 498 -> 409 s/p insulin 10u in ED  - UA negative for ketones, BHB -ve  - A1c 8.2  - Start lantus/lispro 15/5/5/5 + ISS  - DASH/CC diet  - Seen by Endocrinology, recommended to discharge on Lantus 20 units AM and Glimepiride 2mg daily  - glucose 60 this AM, monitor FS    # Mild MARCO on CKD3, stable Cr 1.3  # Hyperkalemia, resolved  # Pseudohyponatremia  - hold ACE-I  - Baseline Cr 1.2, here with 1.5, BUN 26  - K 5.7 (not hemolyzed), pt received insulin 10, lokelma 10, and Ca gluconate  - EKG NSR with no hyperkalemic changes  - Lactate 4.3 -> 2.8, trend  - Encourage PO intake    # HTN  # HTN Urgency, imrpoved  - not on any home meds  - /85 -> 196/77 in ED, pt asymptomatic  - Start nifedipine 30mg XL > increased to Nifedipine 60 XL 2/12    # Myelodysplastic syndrome  - Follows with Dr Wade, sent in from office today  - Receives transfusions every 3 weeks and has chronic diarrhea 2/2 treatment  - Hb stable around 8  - Outpt heme/onc follow up    DVT ppx: subq hep  GI ppx: Protonix  Diet: DASH/TLC  Activity: IAT  Dispo: Acute

## 2023-02-13 NOTE — PROGRESS NOTE ADULT - ASSESSMENT
78 y/o woman with PMH of HTN, DM 2, CKD 3 and MDS was sent in to the ED after her visit with heme/onc for hyperkalemia and hyperglycemia. Pt is on PO DM meds and has had uncontrolled blood sugars for a long time. She was found to have hyperkalemia and FS > 500. She endorses chronic diarrhea 2/2 side effects from MDS treatment with a baseline of 3-5 BMs per day. In the ED: /85, HR 78, T 98F, RR 18 satting 100% on RA. Labs notable for Hb 8 (chronic at baseline), Na 130, K 5.7, glucose 474. Cr 1.5 (baseline 1.2). VBG shows pH 7.31, pCO2 50, lactate 4.3 -> 2.8. UA negative for ketones, BHB negative. EKG NSR no hyperkalemic changes. Pt was given insulin, calcium, and lokelma in ED and was admitted to medicine. .     1. Uncontrolled DM type 2  A1C 8.8  Seen by Endocrinology:  recommended to discharge on Lantus 20 units AM and Glimepiride 2mg daily   Diabetic diet.   currently on lantus 15 units SQ AM and lispro 5units qAC and monitor FS (low FS this morning could be from decreased PO intake last night)    2. Transaminitis  pt asymptomatic  now trending down  RUQ US: cholelithiasis and sludge without sono evidence of cholecystitis    3. Hyperkalemia now resolved  low potassium diet.     4. CKD stage 3 - now stable    5. Hypertensive Urgency - now resolved  no ACE-I or ARB due to hyperkalemia  agree with nifedipine XL 60mg daily    6. Myelodysplastic syndrome  Follows with Dr Wade  Receives transfusions every 3 weeks and has chronic diarrhea due to treatment  Hb 8.2 at baseline  Outpt heme/onc follow up    7. Lactic acidosis - resolving - repeat level in AM    8. DVT Prophylaxis: Heparin SQ      full code    PROGRESS NOTE HANDOFF    Pending: monitor FS, repeat lactate level    pt informed of the plan of care    Disposition: home with care in 24hrs 78 y/o woman with PMH of HTN, DM 2, CKD 3 and MDS was sent in to the ED after her visit with heme/onc for hyperkalemia and hyperglycemia. Pt is on PO DM meds and has had uncontrolled blood sugars for a long time. She was found to have hyperkalemia and FS > 500. She endorses chronic diarrhea 2/2 side effects from MDS treatment with a baseline of 3-5 BMs per day. In the ED: /85, HR 78, T 98F, RR 18 satting 100% on RA. Labs notable for Hb 8 (chronic at baseline), Na 130, K 5.7, glucose 474. Cr 1.5 (baseline 1.2). VBG shows pH 7.31, pCO2 50, lactate 4.3 -> 2.8. UA negative for ketones, BHB negative. EKG NSR no hyperkalemic changes. Pt was given insulin, calcium, and lokelma in ED and was admitted to medicine. .     1. Uncontrolled DM type 2  A1C 8.8  Seen by Endocrinology:  recommended to discharge on Lantus 20 units AM and Glimepiride 2mg daily   Diabetic diet.   currently on lantus 15 units SQ AM and lispro 5units qAC and monitor FS (low FS this morning could be from decreased PO intake last night)    2. Transaminitis  pt asymptomatic  now trending down  RUQ US: cholelithiasis and sludge without sono evidence of cholecystitis    3. Hyperkalemia now resolved  low potassium diet.     4. CKD stage 3 - now stable    5. Hypertensive Urgency - now resolved  no ACE-I or ARB due to hyperkalemia  agree with nifedipine XL 60mg daily    6. Myelodysplastic syndrome  Follows with Dr Wade  Receives transfusions every 3 weeks and has chronic diarrhea due to treatment  Hb 8.2 at baseline  Outpt heme/onc follow up    7. Lactic acidosis - resolving - repeat level in AM    8. DVT Prophylaxis: Heparin SQ    9. Hypomagnesemia repleted PO      full code    PROGRESS NOTE HANDOFF    Pending: monitor FS, repeat lactate level    pt informed of the plan of care    Disposition: home with care in 24hrs

## 2023-02-13 NOTE — CDI QUERY NOTE - NSCDIOTHERTXTBX_GEN_ALL_CORE_HH
CLINICAL INDICATORS    2/10 Discharge Note Provider: …sent in to the ED after her visit with heme/onc for hyperkalemia and hyperglycemia. Pt is on PO DM meds and has had uncontrolled blood sugars for a long time. She was found to have hyperkalemia and FS > 500… Uncontrolled DM type 2: A1C 8.8… VBG shows pH 7.31, pCO2 50, lactate 4.3 -> 2.8. UA negative for ketones… Patient was started on IV fluid, Lantus, FS improved. BP improved… Seen by Endocrinology, recommended to discharge on Lantus 20 units AM and Glimepiride 2mg daily…    Lab Results:  • Lactate, Blood: 2.8 (2/10)  • Blood Gas Venous – Lactate: 4.30 (2/9)  • pH, Venous: 7.31 (2/9)  • pCO2, Venous: 50 (2/9)  • Anion Gap: 13 (2/10)    Orders:   • IV NS @ 60 mL/hr (2/10 – 2/11)      Based on your professional judgment and the clinical indicators please clarify if a diagnosis corresponding with the labs and treated with IV fluids can be clarified as:  • Excessive lacticemia  • Acidosis  • Clinically insignificant lab results  • Other (please specify):  • Clinically unable to determine

## 2023-02-14 ENCOUNTER — TRANSCRIPTION ENCOUNTER (OUTPATIENT)
Age: 78
End: 2023-02-14

## 2023-02-14 VITALS
RESPIRATION RATE: 18 BRPM | DIASTOLIC BLOOD PRESSURE: 58 MMHG | TEMPERATURE: 98 F | HEART RATE: 93 BPM | SYSTOLIC BLOOD PRESSURE: 119 MMHG

## 2023-02-14 LAB
ALBUMIN SERPL ELPH-MCNC: 3.7 G/DL — SIGNIFICANT CHANGE UP (ref 3.5–5.2)
ALP SERPL-CCNC: 157 U/L — HIGH (ref 30–115)
ALT FLD-CCNC: 58 U/L — HIGH (ref 0–41)
ANION GAP SERPL CALC-SCNC: 12 MMOL/L — SIGNIFICANT CHANGE UP (ref 7–14)
AST SERPL-CCNC: 42 U/L — HIGH (ref 0–41)
BASOPHILS # BLD AUTO: 0.08 K/UL — SIGNIFICANT CHANGE UP (ref 0–0.2)
BASOPHILS NFR BLD AUTO: 1.6 % — HIGH (ref 0–1)
BILIRUB SERPL-MCNC: 0.9 MG/DL — SIGNIFICANT CHANGE UP (ref 0.2–1.2)
BUN SERPL-MCNC: 27 MG/DL — HIGH (ref 10–20)
CALCIUM SERPL-MCNC: 8.9 MG/DL — SIGNIFICANT CHANGE UP (ref 8.4–10.5)
CHLORIDE SERPL-SCNC: 104 MMOL/L — SIGNIFICANT CHANGE UP (ref 98–110)
CO2 SERPL-SCNC: 22 MMOL/L — SIGNIFICANT CHANGE UP (ref 17–32)
CREAT SERPL-MCNC: 1.2 MG/DL — SIGNIFICANT CHANGE UP (ref 0.7–1.5)
EGFR: 47 ML/MIN/1.73M2 — LOW
EOSINOPHIL # BLD AUTO: 0.14 K/UL — SIGNIFICANT CHANGE UP (ref 0–0.7)
EOSINOPHIL NFR BLD AUTO: 2.8 % — SIGNIFICANT CHANGE UP (ref 0–8)
GLUCOSE BLDC GLUCOMTR-MCNC: 359 MG/DL — HIGH (ref 70–99)
GLUCOSE BLDC GLUCOMTR-MCNC: 78 MG/DL — SIGNIFICANT CHANGE UP (ref 70–99)
GLUCOSE SERPL-MCNC: 57 MG/DL — LOW (ref 70–99)
HCT VFR BLD CALC: 22.4 % — LOW (ref 37–47)
HGB BLD-MCNC: 7.5 G/DL — LOW (ref 12–16)
IMM GRANULOCYTES NFR BLD AUTO: 2.4 % — HIGH (ref 0.1–0.3)
LACTATE SERPL-SCNC: 1.7 MMOL/L — SIGNIFICANT CHANGE UP (ref 0.7–2)
LYMPHOCYTES # BLD AUTO: 1.12 K/UL — LOW (ref 1.2–3.4)
LYMPHOCYTES # BLD AUTO: 22.6 % — SIGNIFICANT CHANGE UP (ref 20.5–51.1)
MAGNESIUM SERPL-MCNC: 1.6 MG/DL — LOW (ref 1.8–2.4)
MCHC RBC-ENTMCNC: 29.8 PG — SIGNIFICANT CHANGE UP (ref 27–31)
MCHC RBC-ENTMCNC: 33.5 G/DL — SIGNIFICANT CHANGE UP (ref 32–37)
MCV RBC AUTO: 88.9 FL — SIGNIFICANT CHANGE UP (ref 81–99)
MONOCYTES # BLD AUTO: 0.76 K/UL — HIGH (ref 0.1–0.6)
MONOCYTES NFR BLD AUTO: 15.3 % — HIGH (ref 1.7–9.3)
NEUTROPHILS # BLD AUTO: 2.74 K/UL — SIGNIFICANT CHANGE UP (ref 1.4–6.5)
NEUTROPHILS NFR BLD AUTO: 55.3 % — SIGNIFICANT CHANGE UP (ref 42.2–75.2)
NRBC # BLD: 0 /100 WBCS — SIGNIFICANT CHANGE UP (ref 0–0)
PLATELET # BLD AUTO: 411 K/UL — HIGH (ref 130–400)
POTASSIUM SERPL-MCNC: 4.8 MMOL/L — SIGNIFICANT CHANGE UP (ref 3.5–5)
POTASSIUM SERPL-SCNC: 4.8 MMOL/L — SIGNIFICANT CHANGE UP (ref 3.5–5)
PROT SERPL-MCNC: 5.3 G/DL — LOW (ref 6–8)
RBC # BLD: 2.52 M/UL — LOW (ref 4.2–5.4)
RBC # FLD: 17.7 % — HIGH (ref 11.5–14.5)
SODIUM SERPL-SCNC: 138 MMOL/L — SIGNIFICANT CHANGE UP (ref 135–146)
WBC # BLD: 4.96 K/UL — SIGNIFICANT CHANGE UP (ref 4.8–10.8)
WBC # FLD AUTO: 4.96 K/UL — SIGNIFICANT CHANGE UP (ref 4.8–10.8)

## 2023-02-14 PROCEDURE — 99238 HOSP IP/OBS DSCHRG MGMT 30/<: CPT

## 2023-02-14 RX ORDER — REPAGLINIDE 1 MG/1
1 TABLET ORAL
Qty: 0 | Refills: 0 | DISCHARGE

## 2023-02-14 RX ORDER — NIFEDIPINE 30 MG
1 TABLET, EXTENDED RELEASE 24 HR ORAL
Qty: 0 | Refills: 0 | DISCHARGE
Start: 2023-02-14

## 2023-02-14 RX ORDER — NIFEDIPINE 30 MG
1 TABLET, EXTENDED RELEASE 24 HR ORAL
Qty: 30 | Refills: 2
Start: 2023-02-14 | End: 2023-05-14

## 2023-02-14 RX ORDER — INSULIN GLARGINE 100 [IU]/ML
15 INJECTION, SOLUTION SUBCUTANEOUS
Qty: 0 | Refills: 0 | DISCHARGE
Start: 2023-02-14

## 2023-02-14 RX ORDER — GLIMEPIRIDE 1 MG
1 TABLET ORAL
Qty: 0 | Refills: 0 | DISCHARGE

## 2023-02-14 RX ORDER — INSULIN GLARGINE 100 [IU]/ML
15 INJECTION, SOLUTION SUBCUTANEOUS
Qty: 450 | Refills: 2
Start: 2023-02-14 | End: 2023-05-14

## 2023-02-14 RX ORDER — GLIMEPIRIDE 1 MG
1 TABLET ORAL
Qty: 30 | Refills: 2
Start: 2023-02-14 | End: 2023-05-14

## 2023-02-14 RX ADMIN — Medication 5 UNIT(S): at 11:52

## 2023-02-14 RX ADMIN — Medication 10: at 11:52

## 2023-02-14 RX ADMIN — MAGNESIUM OXIDE 400 MG ORAL TABLET 400 MILLIGRAM(S): 241.3 TABLET ORAL at 08:15

## 2023-02-14 RX ADMIN — MAGNESIUM OXIDE 400 MG ORAL TABLET 400 MILLIGRAM(S): 241.3 TABLET ORAL at 11:52

## 2023-02-14 RX ADMIN — INSULIN GLARGINE 15 UNIT(S): 100 INJECTION, SOLUTION SUBCUTANEOUS at 08:14

## 2023-02-14 RX ADMIN — Medication 60 MILLIGRAM(S): at 05:12

## 2023-02-14 RX ADMIN — PANTOPRAZOLE SODIUM 40 MILLIGRAM(S): 20 TABLET, DELAYED RELEASE ORAL at 05:12

## 2023-02-14 RX ADMIN — Medication 81 MILLIGRAM(S): at 11:52

## 2023-02-14 NOTE — PROGRESS NOTE ADULT - SUBJECTIVE AND OBJECTIVE BOX
SIMONA KUHN  77y Female    INTERVAL HPI/OVERNIGHT EVENTS:    pt feels well - no complaints  no fever, pain, N/V  wants to go home today    T(F): 98.2 (02-14-23 @ 04:53), Max: 98.2 (02-13-23 @ 19:24)  HR: 87 (02-14-23 @ 04:53) (80 - 88)  BP: 170/76 (02-14-23 @ 04:53) (137/63 - 170/76)  RR: 18 (02-14-23 @ 04:53) (18 - 18)  SpO2: 96% (02-14-23 @ 04:53) (95% - 96%)  I&O's Summary    CAPILLARY BLOOD GLUCOSE      POCT Blood Glucose.: 359 mg/dL (14 Feb 2023 11:45)  POCT Blood Glucose.: 78 mg/dL (14 Feb 2023 08:06)  POCT Blood Glucose.: 230 mg/dL (13 Feb 2023 21:18)  POCT Blood Glucose.: 137 mg/dL (13 Feb 2023 16:45)        PHYSICAL EXAM:  GENERAL: NAD  HEAD:  Normocephalic  EYES:  conjunctiva and sclera clear  ENMT: Moist mucous membranes  NERVOUS SYSTEM:  Alert, awake, Good concentration  CHEST/LUNG: CTA b/l  HEART: Regular rate and rhythm  ABDOMEN: Soft, Nontender, Nondistended; Bowel sounds present  EXTREMITIES:   No edema  SKIN: warm, dry    Consultant(s) Notes Reviewed:  [x ] YES  [ ] NO  Care Discussed with Consultants/Other Providers [ x] YES  [ ] NO    MEDICATIONS  (STANDING):  aspirin enteric coated 81 milliGRAM(s) Oral daily  dextrose 5%. 1000 milliLiter(s) (100 mL/Hr) IV Continuous <Continuous>  dextrose 5%. 1000 milliLiter(s) (50 mL/Hr) IV Continuous <Continuous>  dextrose 50% Injectable 25 Gram(s) IV Push once  dextrose 50% Injectable 12.5 Gram(s) IV Push once  dextrose 50% Injectable 25 Gram(s) IV Push once  glucagon  Injectable 1 milliGRAM(s) IntraMuscular once  heparin   Injectable 5000 Unit(s) SubCutaneous every 8 hours  influenza  Vaccine (HIGH DOSE) 0.7 milliLiter(s) IntraMuscular once  insulin glargine Injectable (LANTUS) 15 Unit(s) SubCutaneous every morning  insulin lispro (ADMELOG) corrective regimen sliding scale   SubCutaneous three times a day before meals  insulin lispro Injectable (ADMELOG) 5 Unit(s) SubCutaneous three times a day before meals  magnesium oxide 400 milliGRAM(s) Oral three times a day with meals  NIFEdipine XL 60 milliGRAM(s) Oral daily  pantoprazole    Tablet 40 milliGRAM(s) Oral before breakfast    MEDICATIONS  (PRN):  acetaminophen     Tablet .. 650 milliGRAM(s) Oral every 6 hours PRN Temp greater or equal to 38C (100.4F), Mild Pain (1 - 3)  dextrose Oral Gel 15 Gram(s) Oral once PRN Blood Glucose LESS THAN 70 milliGRAM(s)/deciliter  melatonin 5 milliGRAM(s) Oral at bedtime PRN Insomnia      LABS:                        7.5    4.96  )-----------( 411      ( 14 Feb 2023 07:05 )             22.4     02-14    138  |  104  |  27<H>  ----------------------------<  57<L>  4.8   |  22  |  1.2    Ca    8.9      14 Feb 2023 07:05  Mg     1.6     02-14    TPro  5.3<L>  /  Alb  3.7  /  TBili  0.9  /  DBili  x   /  AST  42<H>  /  ALT  58<H>  /  AlkPhos  157<H>  02-14            Case discussed with residents and RN on rounds today    Care discussed with pt

## 2023-02-14 NOTE — DISCHARGE NOTE NURSING/CASE MANAGEMENT/SOCIAL WORK - NSDCPEFALRISK_GEN_ALL_CORE
For information on Fall & Injury Prevention, visit: [email or web address] OR  [email or web address] OR  https://www.cdc.gov/steadi/patient.html

## 2023-02-14 NOTE — PROGRESS NOTE ADULT - REASON FOR ADMISSION
Hyperglycemia, Hyperkalemia

## 2023-02-14 NOTE — PROGRESS NOTE ADULT - ASSESSMENT
76 y/o woman with PMH of HTN, DM 2, CKD 3 and MDS was sent in to the ED after her visit with heme/onc for hyperkalemia and hyperglycemia. Pt is on PO DM meds and has had uncontrolled blood sugars for a long time. She was found to have hyperkalemia and FS > 500. She endorses chronic diarrhea 2/2 side effects from MDS treatment with a baseline of 3-5 BMs per day. In the ED: /85, HR 78, T 98F, RR 18 satting 100% on RA. Labs notable for Hb 8 (chronic at baseline), Na 130, K 5.7, glucose 474. Cr 1.5 (baseline 1.2). VBG shows pH 7.31, pCO2 50, lactate 4.3 -> 2.8. UA negative for ketones, BHB negative. EKG NSR no hyperkalemic changes. Pt was given insulin, calcium, and lokelma in ED and was admitted to medicine. .     1. Uncontrolled DM type 2  A1C 8.8  Seen by Endocrinology:  recommended to discharge on Lantus 20 units AM and Glimepiride 2mg daily   Diabetic diet.   currently on lantus 15 units SQ AM and lispro 5units qAC -> discharge on current dose of lantus with glimepiride    2. Transaminitis  pt asymptomatic  now trending down  RUQ US: cholelithiasis and sludge without sono evidence of cholecystitis    3. Hyperkalemia now resolved  low potassium diet.     4. CKD stage 3 - now stable    5. Hypertensive Urgency - now resolved  no ACE-I or ARB due to hyperkalemia  agree with nifedipine XL 60mg daily and outpt f/u    6. Myelodysplastic syndrome  Follows with Dr Wade  Receives transfusions every 3 weeks and has chronic diarrhea due to treatment  Hb at baseline  Outpt heme/onc follow up    7. Lactic acidosis - resolved today 1.7     8. DVT Prophylaxis: Heparin SQ    9. Hypomagnesemia repleted PO      full code    med reconciliation and discharge papers reviewed by me

## 2023-02-14 NOTE — DISCHARGE NOTE NURSING/CASE MANAGEMENT/SOCIAL WORK - PATIENT PORTAL LINK FT
You can access the FollowMyHealth Patient Portal offered by [facility] by registering at the following website: [email or web address]. By joining [employer]’s FollowMyHealth portal, you will also be able to view your health information using other applications (apps) compatible with our system.

## 2023-02-14 NOTE — PROGRESS NOTE ADULT - SUBJECTIVE AND OBJECTIVE BOX
SIMONA KUHN 77y Female  MRN#: [id number]     Hospital Day: 5d    Pt is currently admitted with the primary diagnosis of uncontrolled hyperglycemia and hyperkalemia    SUBJECTIVE    Patient seen and examined at bedside. Resting comfortably in NAD. Reports feeling much better. No acute compaints                                            ----------------------------------------------------------  OBJECTIVE  PAST MEDICAL & SURGICAL HISTORY  DM (diabetes mellitus)    MDS (myelodysplastic syndrome)    No significant past surgical history                                              -----------------------------------------------------------  ALLERGIES:  No Known Allergies                                            ------------------------------------------------------------    HOME MEDICATIONS  Home Medications:  Aspir 81 oral delayed release tablet: 1 tab(s) orally once a day (10 Feb 2023 05:38)  glimepiride 4 mg oral tablet: 1 tab(s) orally 2 times a day (10 Feb 2023 05:38)  repaglinide 0.5 mg oral tablet: 1 tab(s) orally 3 times a day (before meals) (10 Feb 2023 05:38)                           MEDICATIONS:  STANDING MEDICATIONS  aspirin enteric coated 81 milliGRAM(s) Oral daily  dextrose 5%. 1000 milliLiter(s) IV Continuous <Continuous>  dextrose 5%. 1000 milliLiter(s) IV Continuous <Continuous>  dextrose 50% Injectable 25 Gram(s) IV Push once  dextrose 50% Injectable 12.5 Gram(s) IV Push once  dextrose 50% Injectable 25 Gram(s) IV Push once  glucagon  Injectable 1 milliGRAM(s) IntraMuscular once  heparin   Injectable 5000 Unit(s) SubCutaneous every 8 hours  influenza  Vaccine (HIGH DOSE) 0.7 milliLiter(s) IntraMuscular once  insulin glargine Injectable (LANTUS) 15 Unit(s) SubCutaneous every morning  insulin lispro (ADMELOG) corrective regimen sliding scale   SubCutaneous three times a day before meals  insulin lispro Injectable (ADMELOG) 5 Unit(s) SubCutaneous three times a day before meals  NIFEdipine XL 60 milliGRAM(s) Oral daily  pantoprazole    Tablet 40 milliGRAM(s) Oral before breakfast    PRN MEDICATIONS  acetaminophen     Tablet .. 650 milliGRAM(s) Oral every 6 hours PRN  dextrose Oral Gel 15 Gram(s) Oral once PRN  melatonin 5 milliGRAM(s) Oral at bedtime PRN                                            ------------------------------------------------------------  VITAL SIGNS: Last 24 Hours  Vital Signs Last 24 Hrs  T(C): 36.8 (14 Feb 2023 04:53), Max: 36.8 (13 Feb 2023 19:24)  T(F): 98.2 (14 Feb 2023 04:53), Max: 98.2 (13 Feb 2023 19:24)  HR: 87 (14 Feb 2023 04:53) (80 - 88)  BP: 170/76 (14 Feb 2023 04:53) (137/63 - 170/76)  BP(mean): --  RR: 18 (14 Feb 2023 04:53) (18 - 18)  SpO2: 96% (14 Feb 2023 04:53) (95% - 96%)    Parameters below as of 14 Feb 2023 04:53  Patient On (Oxygen Delivery Method): room air                                           --------------------------------------------------------------  LABS:               LABS:                        7.5    4.96  )-----------( 411      ( 14 Feb 2023 07:05 )             22.4     02-14    138  |  104  |  27<H>  ----------------------------<  57<L>  4.8   |  22  |  1.2    Ca    8.9      14 Feb 2023 07:05  Mg     1.6     02-14    TPro  5.3<L>  /  Alb  3.7  /  TBili  0.9  /  DBili  x   /  AST  42<H>  /  ALT  58<H>  /  AlkPhos  157<H>  02-14                                                -------------------------------------------------------------  RADIOLOGY:                                            --------------------------------------------------------------    PHYSICAL EXAM:  GENERAL: NAD, well-developed.  HEAD:  Atraumatic, Normocephalic.  EYES: EOMI, PERRLA, conjunctiva and sclera clear.  NECK: Supple, No JVD.  CHEST/LUNG: Clear to auscultation bilaterally; No wheeze.  HEART: Regular rate and rhythm; S1 S2.   ABDOMEN: Soft, mild TTP in RUQ, Nondistended; Bowel sounds present.  EXTREMITIES:  2+ Peripheral Pulses, No clubbing, cyanosis, or edema.  PSYCH: AAOx3.  NEUROLOGY: non-focal.  SKIN: No rashes or lesions.                                           --------------------------------------------------------------    ASSESSMENT & PLAN    77F with PMHx of MDS, DM, HTN, CKD3 sent in to the ED after her visit with heme/onc for hyperkalemia and hyperglycemia. Pt is on PO DM meds and has had uncontrolled blood sugars for a long time.     # Elevated LFTs, downtrending   # Chronic ALP elevation   - >168>166 >157  - AST 23>146>149>49  - ALT 24>120>123>58  - direct bili 0.3, indirect bili 0.7  - pt has mild TTP in RUQ, resolved  - RUQ U/S: cholelithiasis, GB sludge without sonographic evidence of cholecystitis  - out-patient f/u    # Uncontrolled type 2 DM with hyperglycemia  # Hypoglycemia episode   - Pt on home Glimeperide and Repaglinide   - Has had uncontrolled FS for a long time, found to have FS > 500 during outpt heme/onc appt  - Glucose 498 -> 409 s/p insulin 10u in ED  - UA negative for ketones, BHB -ve  - A1c 8.2  - Start lantus/lispro 15/5/5/5 + ISS  - DASH/CC diet  - Seen by Endocrinology, recommended to discharge on Lantus 20 units AM and Glimepiride 2mg daily  - will d/c on Lantus 15, Glimepiride 2mg qd    # Mild MARCO on CKD3, stable Cr 1.3  # Hyperkalemia, resolved  # Pseudohyponatremia  - hold ACE-I  - Baseline Cr 1.2, here with 1.5, BUN 26  - K 5.7 (not hemolyzed), pt received insulin 10, lokelma 10, and Ca gluconate  - EKG NSR with no hyperkalemic changes  - Lactate 4.3 -> 2.8, trend  - Encourage PO intake    # HTN  # HTN Urgency, imrpoved  - not on any home meds  - /85 -> 196/77 in ED, pt asymptomatic  - c/w Nifedipine 60 XL 2/12    # Myelodysplastic syndrome  - Follows with Dr Wade, sent in from office today  - Receives transfusions every 3 weeks and has chronic diarrhea 2/2 treatment  - Hb stable around 8  - Outpt heme/onc follow up    DVT ppx: subq hep  GI ppx: Protonix  Diet: DASH/TLC  Activity: IAT  Dispo: Acute

## 2023-02-15 ENCOUNTER — OUTPATIENT (OUTPATIENT)
Dept: OUTPATIENT SERVICES | Facility: HOSPITAL | Age: 78
LOS: 1 days | End: 2023-02-15
Payer: MEDICARE

## 2023-02-15 ENCOUNTER — LABORATORY RESULT (OUTPATIENT)
Age: 78
End: 2023-02-15

## 2023-02-15 ENCOUNTER — APPOINTMENT (OUTPATIENT)
Dept: INFUSION THERAPY | Facility: CLINIC | Age: 78
End: 2023-02-15

## 2023-02-15 DIAGNOSIS — Z51.11 ENCOUNTER FOR ANTINEOPLASTIC CHEMOTHERAPY: ICD-10-CM

## 2023-02-15 DIAGNOSIS — E53.8 DEFICIENCY OF OTHER SPECIFIED B GROUP VITAMINS: ICD-10-CM

## 2023-02-15 DIAGNOSIS — D64.9 ANEMIA, UNSPECIFIED: ICD-10-CM

## 2023-02-15 DIAGNOSIS — D75.839 THROMBOCYTOSIS, UNSPECIFIED: ICD-10-CM

## 2023-02-15 DIAGNOSIS — D46.9 MYELODYSPLASTIC SYNDROME, UNSPECIFIED: ICD-10-CM

## 2023-02-15 PROCEDURE — 85027 COMPLETE CBC AUTOMATED: CPT

## 2023-02-15 PROCEDURE — 96372 THER/PROPH/DIAG INJ SC/IM: CPT

## 2023-02-15 RX ORDER — ERYTHROPOIETIN 10000 [IU]/ML
80000 INJECTION, SOLUTION INTRAVENOUS; SUBCUTANEOUS ONCE
Refills: 0 | Status: COMPLETED | OUTPATIENT
Start: 2023-02-15 | End: 2023-02-15

## 2023-02-15 RX ADMIN — ERYTHROPOIETIN 80000 UNIT(S): 10000 INJECTION, SOLUTION INTRAVENOUS; SUBCUTANEOUS at 17:22

## 2023-02-16 LAB
HCT VFR BLD CALC: 23.2 %
HGB BLD-MCNC: 7.5 G/DL
MCHC RBC-ENTMCNC: 28.7 PG
MCHC RBC-ENTMCNC: 32.3 G/DL
MCV RBC AUTO: 88.9 FL
PLATELET # BLD AUTO: 486 K/UL
PMV BLD: 10.4 FL
RBC # BLD: 2.61 M/UL
RBC # FLD: 17.8 %
WBC # FLD AUTO: 6.49 K/UL

## 2023-02-17 DIAGNOSIS — E11.65 TYPE 2 DIABETES MELLITUS WITH HYPERGLYCEMIA: ICD-10-CM

## 2023-02-17 DIAGNOSIS — R74.8 ABNORMAL LEVELS OF OTHER SERUM ENZYMES: ICD-10-CM

## 2023-02-17 DIAGNOSIS — E87.20 ACIDOSIS, UNSPECIFIED: ICD-10-CM

## 2023-02-17 DIAGNOSIS — Z79.82 LONG TERM (CURRENT) USE OF ASPIRIN: ICD-10-CM

## 2023-02-17 DIAGNOSIS — I12.9 HYPERTENSIVE CHRONIC KIDNEY DISEASE WITH STAGE 1 THROUGH STAGE 4 CHRONIC KIDNEY DISEASE, OR UNSPECIFIED CHRONIC KIDNEY DISEASE: ICD-10-CM

## 2023-02-17 DIAGNOSIS — Z20.822 CONTACT WITH AND (SUSPECTED) EXPOSURE TO COVID-19: ICD-10-CM

## 2023-02-17 DIAGNOSIS — N18.30 CHRONIC KIDNEY DISEASE, STAGE 3 UNSPECIFIED: ICD-10-CM

## 2023-02-17 DIAGNOSIS — E83.42 HYPOMAGNESEMIA: ICD-10-CM

## 2023-02-17 DIAGNOSIS — N17.9 ACUTE KIDNEY FAILURE, UNSPECIFIED: ICD-10-CM

## 2023-02-17 DIAGNOSIS — E87.5 HYPERKALEMIA: ICD-10-CM

## 2023-02-17 DIAGNOSIS — K52.9 NONINFECTIVE GASTROENTERITIS AND COLITIS, UNSPECIFIED: ICD-10-CM

## 2023-02-17 DIAGNOSIS — E11.649 TYPE 2 DIABETES MELLITUS WITH HYPOGLYCEMIA WITHOUT COMA: ICD-10-CM

## 2023-02-17 DIAGNOSIS — Z79.84 LONG TERM (CURRENT) USE OF ORAL HYPOGLYCEMIC DRUGS: ICD-10-CM

## 2023-02-17 DIAGNOSIS — E86.0 DEHYDRATION: ICD-10-CM

## 2023-02-17 DIAGNOSIS — E11.22 TYPE 2 DIABETES MELLITUS WITH DIABETIC CHRONIC KIDNEY DISEASE: ICD-10-CM

## 2023-02-17 DIAGNOSIS — I16.0 HYPERTENSIVE URGENCY: ICD-10-CM

## 2023-02-17 DIAGNOSIS — D46.9 MYELODYSPLASTIC SYNDROME, UNSPECIFIED: ICD-10-CM

## 2023-02-23 ENCOUNTER — APPOINTMENT (OUTPATIENT)
Dept: INFUSION THERAPY | Facility: CLINIC | Age: 78
End: 2023-02-23

## 2023-02-23 ENCOUNTER — OUTPATIENT (OUTPATIENT)
Dept: OUTPATIENT SERVICES | Facility: HOSPITAL | Age: 78
LOS: 1 days | End: 2023-02-23
Payer: MEDICARE

## 2023-02-23 ENCOUNTER — LABORATORY RESULT (OUTPATIENT)
Age: 78
End: 2023-02-23

## 2023-02-23 VITALS
TEMPERATURE: 100 F | OXYGEN SATURATION: 100 % | WEIGHT: 138.89 LBS | SYSTOLIC BLOOD PRESSURE: 153 MMHG | HEART RATE: 79 BPM | DIASTOLIC BLOOD PRESSURE: 67 MMHG | HEIGHT: 62 IN | RESPIRATION RATE: 16 BRPM

## 2023-02-23 DIAGNOSIS — Z51.11 ENCOUNTER FOR ANTINEOPLASTIC CHEMOTHERAPY: ICD-10-CM

## 2023-02-23 DIAGNOSIS — Z98.890 OTHER SPECIFIED POSTPROCEDURAL STATES: Chronic | ICD-10-CM

## 2023-02-23 DIAGNOSIS — D46.9 MYELODYSPLASTIC SYNDROME, UNSPECIFIED: ICD-10-CM

## 2023-02-23 DIAGNOSIS — E53.8 DEFICIENCY OF OTHER SPECIFIED B GROUP VITAMINS: ICD-10-CM

## 2023-02-23 DIAGNOSIS — D75.839 THROMBOCYTOSIS, UNSPECIFIED: ICD-10-CM

## 2023-02-23 DIAGNOSIS — D64.9 ANEMIA, UNSPECIFIED: ICD-10-CM

## 2023-02-23 DIAGNOSIS — Z01.818 ENCOUNTER FOR OTHER PREPROCEDURAL EXAMINATION: ICD-10-CM

## 2023-02-23 LAB
ALBUMIN SERPL ELPH-MCNC: 4.1 G/DL — SIGNIFICANT CHANGE UP (ref 3.5–5.2)
ALP SERPL-CCNC: 145 U/L — HIGH (ref 30–115)
ALT FLD-CCNC: 21 U/L — SIGNIFICANT CHANGE UP (ref 0–41)
ANION GAP SERPL CALC-SCNC: 13 MMOL/L — SIGNIFICANT CHANGE UP (ref 7–14)
APTT BLD: 30.2 SEC — SIGNIFICANT CHANGE UP (ref 27–39.2)
AST SERPL-CCNC: 19 U/L — SIGNIFICANT CHANGE UP (ref 0–41)
BASOPHILS # BLD AUTO: 0.06 K/UL — SIGNIFICANT CHANGE UP (ref 0–0.2)
BASOPHILS NFR BLD AUTO: 1 % — SIGNIFICANT CHANGE UP (ref 0–1)
BILIRUB SERPL-MCNC: 0.6 MG/DL — SIGNIFICANT CHANGE UP (ref 0.2–1.2)
BUN SERPL-MCNC: 27 MG/DL — HIGH (ref 10–20)
CALCIUM SERPL-MCNC: 8.7 MG/DL — SIGNIFICANT CHANGE UP (ref 8.4–10.5)
CHLORIDE SERPL-SCNC: 95 MMOL/L — LOW (ref 98–110)
CO2 SERPL-SCNC: 19 MMOL/L — SIGNIFICANT CHANGE UP (ref 17–32)
CREAT SERPL-MCNC: 1.4 MG/DL — SIGNIFICANT CHANGE UP (ref 0.7–1.5)
EGFR: 39 ML/MIN/1.73M2 — LOW
EOSINOPHIL # BLD AUTO: 0.04 K/UL — SIGNIFICANT CHANGE UP (ref 0–0.7)
EOSINOPHIL NFR BLD AUTO: 0.6 % — SIGNIFICANT CHANGE UP (ref 0–8)
GLUCOSE SERPL-MCNC: 523 MG/DL — CRITICAL HIGH (ref 70–99)
HCT VFR BLD CALC: 20.1 % — LOW (ref 37–47)
HGB BLD-MCNC: 6.5 G/DL — CRITICAL LOW (ref 12–16)
IMM GRANULOCYTES NFR BLD AUTO: 2.1 % — HIGH (ref 0.1–0.3)
INR BLD: 1.1 RATIO — SIGNIFICANT CHANGE UP (ref 0.65–1.3)
LYMPHOCYTES # BLD AUTO: 1.1 K/UL — LOW (ref 1.2–3.4)
LYMPHOCYTES # BLD AUTO: 17.5 % — LOW (ref 20.5–51.1)
MCHC RBC-ENTMCNC: 29.4 PG — SIGNIFICANT CHANGE UP (ref 27–31)
MCHC RBC-ENTMCNC: 32.3 G/DL — SIGNIFICANT CHANGE UP (ref 32–37)
MCV RBC AUTO: 91 FL — SIGNIFICANT CHANGE UP (ref 81–99)
MONOCYTES # BLD AUTO: 0.69 K/UL — HIGH (ref 0.1–0.6)
MONOCYTES NFR BLD AUTO: 11 % — HIGH (ref 1.7–9.3)
NEUTROPHILS # BLD AUTO: 4.27 K/UL — SIGNIFICANT CHANGE UP (ref 1.4–6.5)
NEUTROPHILS NFR BLD AUTO: 67.8 % — SIGNIFICANT CHANGE UP (ref 42.2–75.2)
NRBC # BLD: 0 /100 WBCS — SIGNIFICANT CHANGE UP (ref 0–0)
PLATELET # BLD AUTO: 488 K/UL — HIGH (ref 130–400)
POTASSIUM SERPL-MCNC: 5.3 MMOL/L — HIGH (ref 3.5–5)
POTASSIUM SERPL-SCNC: 5.3 MMOL/L — HIGH (ref 3.5–5)
PROT SERPL-MCNC: 5.9 G/DL — LOW (ref 6–8)
PROTHROM AB SERPL-ACNC: 12.6 SEC — SIGNIFICANT CHANGE UP (ref 9.95–12.87)
RBC # BLD: 2.21 M/UL — LOW (ref 4.2–5.4)
RBC # FLD: 18.4 % — HIGH (ref 11.5–14.5)
SODIUM SERPL-SCNC: 127 MMOL/L — LOW (ref 135–146)
WBC # BLD: 6.29 K/UL — SIGNIFICANT CHANGE UP (ref 4.8–10.8)
WBC # FLD AUTO: 6.29 K/UL — SIGNIFICANT CHANGE UP (ref 4.8–10.8)

## 2023-02-23 PROCEDURE — 86900 BLOOD TYPING SEROLOGIC ABO: CPT

## 2023-02-23 PROCEDURE — 85027 COMPLETE CBC AUTOMATED: CPT

## 2023-02-23 PROCEDURE — 85730 THROMBOPLASTIN TIME PARTIAL: CPT

## 2023-02-23 PROCEDURE — 85610 PROTHROMBIN TIME: CPT

## 2023-02-23 PROCEDURE — 86850 RBC ANTIBODY SCREEN: CPT

## 2023-02-23 PROCEDURE — 99214 OFFICE O/P EST MOD 30 MIN: CPT | Mod: 25

## 2023-02-23 PROCEDURE — 80053 COMPREHEN METABOLIC PANEL: CPT

## 2023-02-23 PROCEDURE — 86901 BLOOD TYPING SEROLOGIC RH(D): CPT

## 2023-02-23 PROCEDURE — 86923 COMPATIBILITY TEST ELECTRIC: CPT

## 2023-02-23 PROCEDURE — 85025 COMPLETE CBC W/AUTO DIFF WBC: CPT

## 2023-02-23 PROCEDURE — 36415 COLL VENOUS BLD VENIPUNCTURE: CPT

## 2023-02-23 PROCEDURE — 96372 THER/PROPH/DIAG INJ SC/IM: CPT

## 2023-02-23 RX ORDER — ERYTHROPOIETIN 10000 [IU]/ML
80000 INJECTION, SOLUTION INTRAVENOUS; SUBCUTANEOUS ONCE
Refills: 0 | Status: COMPLETED | OUTPATIENT
Start: 2023-02-23 | End: 2023-02-23

## 2023-02-23 RX ADMIN — ERYTHROPOIETIN 80000 UNIT(S): 10000 INJECTION, SOLUTION INTRAVENOUS; SUBCUTANEOUS at 15:57

## 2023-02-23 NOTE — H&P PST ADULT - REASON FOR ADMISSION
Case Type: OP Non-block TimeSuite: Interventional RadiologyProceduralist: Shelton Warren  Confirmed Surgery DateTime: 03-   Procedure: Port Placement  Laterality: N/ALength of Procedure: 60 Minutes  Anesthesia Type: Local Standby

## 2023-02-23 NOTE — CHART NOTE - NSCHARTNOTEFT_GEN_A_CORE
Radiology on-call resident was notified by laboratory about patient's critical lab results. Hemoglobin 6.5; Hematocrit 20.1; Glucose 523.  Multiple attempts were made to contact the patient using phone number in the chart, however patient did not answer.

## 2023-02-23 NOTE — H&P PST ADULT - HISTORY OF PRESENT ILLNESS
77 yo female presents for PAST in preparation for  port placement.  Pt states that she is here because she is diabetic and needs a port. She was receiving a weekly injections for myelodysplastic syndrome. She liver at home with her daughter. Pt reports feeling tired and walks only at home   Denies any chest pain, difficulty breathing, SOB, palpitations, dysuria, URI, or any other infections in the last 2 weeks/1 month. Denies any recent travel, contact, or exposure to any persons with known or suspected COVID-19. Pt also denies COVID testing within the last 2 weeks. Pt advised to self quarantine until day of procedure. Exercise tolerance of 20 -30 feet without dyspnea. RONIT reviewed with patient.    Anesthesia Alert  NO--Difficult Airway  NO--History of neck surgery or radiation  NO--Limited ROM of neck  NO--History of Malignant hyperthermia  NO--Personal or family history of Pseudocholinesterase deficiency.  NO--Prior Anesthesia Complication  NO--Latex Allergy  NO--Loose teeth  NO--History of Rheumatoid Arthritis  NO--RONIT  NO--Bleeding risk, ASA  NO--Other_____   written and verbal instructions with teach back on chlorhexidine shampoo provided,  pt verbalized understanding with returned demonstration  Patient verbalized understanding of instructions and was given the opportunity to ask questions and have them answered.

## 2023-02-24 ENCOUNTER — OUTPATIENT (OUTPATIENT)
Dept: OUTPATIENT SERVICES | Facility: HOSPITAL | Age: 78
LOS: 1 days | End: 2023-02-24
Payer: MEDICARE

## 2023-02-24 ENCOUNTER — APPOINTMENT (OUTPATIENT)
Dept: INFUSION THERAPY | Facility: CLINIC | Age: 78
End: 2023-02-24

## 2023-02-24 DIAGNOSIS — E53.8 DEFICIENCY OF OTHER SPECIFIED B GROUP VITAMINS: ICD-10-CM

## 2023-02-24 DIAGNOSIS — Z98.890 OTHER SPECIFIED POSTPROCEDURAL STATES: Chronic | ICD-10-CM

## 2023-02-24 DIAGNOSIS — Z01.818 ENCOUNTER FOR OTHER PREPROCEDURAL EXAMINATION: ICD-10-CM

## 2023-02-24 DIAGNOSIS — D64.9 ANEMIA, UNSPECIFIED: ICD-10-CM

## 2023-02-24 DIAGNOSIS — D75.839 THROMBOCYTOSIS, UNSPECIFIED: ICD-10-CM

## 2023-02-24 DIAGNOSIS — D46.9 MYELODYSPLASTIC SYNDROME, UNSPECIFIED: ICD-10-CM

## 2023-02-24 DIAGNOSIS — Z51.11 ENCOUNTER FOR ANTINEOPLASTIC CHEMOTHERAPY: ICD-10-CM

## 2023-02-24 PROCEDURE — P9040: CPT

## 2023-02-24 PROCEDURE — 36430 TRANSFUSION BLD/BLD COMPNT: CPT

## 2023-02-26 ENCOUNTER — LABORATORY RESULT (OUTPATIENT)
Age: 78
End: 2023-02-26

## 2023-02-27 ENCOUNTER — OUTPATIENT (OUTPATIENT)
Dept: OUTPATIENT SERVICES | Facility: HOSPITAL | Age: 78
LOS: 1 days | End: 2023-02-27
Payer: MEDICARE

## 2023-02-27 ENCOUNTER — LABORATORY RESULT (OUTPATIENT)
Age: 78
End: 2023-02-27

## 2023-02-27 ENCOUNTER — APPOINTMENT (OUTPATIENT)
Dept: HEMATOLOGY ONCOLOGY | Facility: CLINIC | Age: 78
End: 2023-02-27

## 2023-02-27 DIAGNOSIS — D46.9 MYELODYSPLASTIC SYNDROME, UNSPECIFIED: ICD-10-CM

## 2023-02-27 DIAGNOSIS — Z51.11 ENCOUNTER FOR ANTINEOPLASTIC CHEMOTHERAPY: ICD-10-CM

## 2023-02-27 DIAGNOSIS — Z98.890 OTHER SPECIFIED POSTPROCEDURAL STATES: Chronic | ICD-10-CM

## 2023-02-27 DIAGNOSIS — D75.839 THROMBOCYTOSIS, UNSPECIFIED: ICD-10-CM

## 2023-02-27 DIAGNOSIS — D64.9 ANEMIA, UNSPECIFIED: ICD-10-CM

## 2023-02-27 DIAGNOSIS — E53.8 DEFICIENCY OF OTHER SPECIFIED B GROUP VITAMINS: ICD-10-CM

## 2023-02-27 LAB
ABO + RH PNL BLD: NORMAL
ABO + RH PNL BLD: NORMAL
BLD GP AB SCN SERPL QL: NORMAL
HCT VFR BLD CALC: 20.7 %
HCT VFR BLD CALC: 25.2 %
HGB BLD-MCNC: 6.6 G/DL
HGB BLD-MCNC: 8.4 G/DL
MCHC RBC-ENTMCNC: 28 PG
MCHC RBC-ENTMCNC: 29.4 PG
MCHC RBC-ENTMCNC: 31.9 G/DL
MCHC RBC-ENTMCNC: 33.3 G/DL
MCV RBC AUTO: 87.7 FL
MCV RBC AUTO: 88.1 FL
PLATELET # BLD AUTO: 331 K/UL
PLATELET # BLD AUTO: 425 K/UL
PMV BLD: 10.3 FL
PMV BLD: 11.7 FL
RBC # BLD: 2.36 M/UL
RBC # BLD: 2.86 M/UL
RBC # FLD: 17.4 %
RBC # FLD: 18.6 %
WBC # FLD AUTO: 6.25 K/UL
WBC # FLD AUTO: 7.63 K/UL

## 2023-02-27 PROCEDURE — 85027 COMPLETE CBC AUTOMATED: CPT

## 2023-03-01 ENCOUNTER — TRANSCRIPTION ENCOUNTER (OUTPATIENT)
Age: 78
End: 2023-03-01

## 2023-03-01 ENCOUNTER — OUTPATIENT (OUTPATIENT)
Dept: OUTPATIENT SERVICES | Facility: HOSPITAL | Age: 78
LOS: 1 days | Discharge: HOME | End: 2023-03-01
Payer: MEDICARE

## 2023-03-01 ENCOUNTER — RESULT REVIEW (OUTPATIENT)
Age: 78
End: 2023-03-01

## 2023-03-01 VITALS
DIASTOLIC BLOOD PRESSURE: 82 MMHG | RESPIRATION RATE: 18 BRPM | SYSTOLIC BLOOD PRESSURE: 200 MMHG | HEART RATE: 85 BPM | TEMPERATURE: 98 F

## 2023-03-01 VITALS — SYSTOLIC BLOOD PRESSURE: 168 MMHG | DIASTOLIC BLOOD PRESSURE: 79 MMHG

## 2023-03-01 DIAGNOSIS — Z45.2 ENCOUNTER FOR ADJUSTMENT AND MANAGEMENT OF VASCULAR ACCESS DEVICE: ICD-10-CM

## 2023-03-01 DIAGNOSIS — D46.9 MYELODYSPLASTIC SYNDROME, UNSPECIFIED: ICD-10-CM

## 2023-03-01 DIAGNOSIS — Z98.890 OTHER SPECIFIED POSTPROCEDURAL STATES: Chronic | ICD-10-CM

## 2023-03-01 LAB — GLUCOSE BLDC GLUCOMTR-MCNC: 212 MG/DL — HIGH (ref 70–99)

## 2023-03-01 PROCEDURE — 36561 INSERT TUNNELED CV CATH: CPT

## 2023-03-01 PROCEDURE — 77001 FLUOROGUIDE FOR VEIN DEVICE: CPT | Mod: 26

## 2023-03-01 PROCEDURE — 82962 GLUCOSE BLOOD TEST: CPT

## 2023-03-01 PROCEDURE — 77001 FLUOROGUIDE FOR VEIN DEVICE: CPT

## 2023-03-01 PROCEDURE — C1769: CPT

## 2023-03-01 PROCEDURE — 76937 US GUIDE VASCULAR ACCESS: CPT

## 2023-03-01 PROCEDURE — C1788: CPT

## 2023-03-01 PROCEDURE — 76937 US GUIDE VASCULAR ACCESS: CPT | Mod: 26

## 2023-03-01 RX ORDER — ONDANSETRON 8 MG/1
4 TABLET, FILM COATED ORAL ONCE
Refills: 0 | Status: DISCONTINUED | OUTPATIENT
Start: 2023-03-01 | End: 2023-03-01

## 2023-03-01 NOTE — ASU DISCHARGE PLAN (ADULT/PEDIATRIC) - ASU DC SPECIAL INSTRUCTIONSFT
After 5 days, may remove both clear adhesives and resume bathing/showering-- Telfa will come off with the adhesive.  Steri-strips will remain and will fall off on their own.  Do not remove the steri strips as the begin to lift off the skin.  AFTER the steri-strips fall off, please call [phone] and schedule an appt with Dr. Washington for routine wound healing check in clinic.

## 2023-03-01 NOTE — PRE-ANESTHESIA EVALUATION ADULT - NSANTHPMHFT_GEN_ALL_CORE
Chart reviewed, PAST evaluation seen. pt interviewed and examined. Labs, EKG seen. Na127, K 5.3. [id number].

## 2023-03-01 NOTE — ASU PATIENT PROFILE, ADULT - FALL HARM RISK - HARM RISK INTERVENTIONS

## 2023-03-02 ENCOUNTER — APPOINTMENT (OUTPATIENT)
Dept: HEMATOLOGY ONCOLOGY | Facility: CLINIC | Age: 78
End: 2023-03-02
Payer: MEDICARE

## 2023-03-02 ENCOUNTER — OUTPATIENT (OUTPATIENT)
Dept: OUTPATIENT SERVICES | Facility: HOSPITAL | Age: 78
LOS: 1 days | End: 2023-03-02
Payer: MEDICARE

## 2023-03-02 ENCOUNTER — LABORATORY RESULT (OUTPATIENT)
Age: 78
End: 2023-03-02

## 2023-03-02 ENCOUNTER — APPOINTMENT (OUTPATIENT)
Dept: INFUSION THERAPY | Facility: CLINIC | Age: 78
End: 2023-03-02
Payer: MEDICARE

## 2023-03-02 VITALS
TEMPERATURE: 98 F | OXYGEN SATURATION: 93 % | RESPIRATION RATE: 16 BRPM | BODY MASS INDEX: 24.84 KG/M2 | HEIGHT: 62 IN | WEIGHT: 135 LBS | DIASTOLIC BLOOD PRESSURE: 72 MMHG | HEART RATE: 109 BPM | SYSTOLIC BLOOD PRESSURE: 145 MMHG

## 2023-03-02 DIAGNOSIS — E53.8 DEFICIENCY OF OTHER SPECIFIED B GROUP VITAMINS: ICD-10-CM

## 2023-03-02 DIAGNOSIS — D46.9 MYELODYSPLASTIC SYNDROME, UNSPECIFIED: ICD-10-CM

## 2023-03-02 DIAGNOSIS — Z98.890 OTHER SPECIFIED POSTPROCEDURAL STATES: Chronic | ICD-10-CM

## 2023-03-02 LAB
HCT VFR BLD CALC: 24.3 %
HGB BLD-MCNC: 8.1 G/DL
MCHC RBC-ENTMCNC: 29.6 PG
MCHC RBC-ENTMCNC: 33.3 G/DL
MCV RBC AUTO: 88.7 FL
PLATELET # BLD AUTO: 329 K/UL
PMV BLD: 11.4 FL
RBC # BLD: 2.74 M/UL
RBC # FLD: 18 %
WBC # FLD AUTO: 7.4 K/UL

## 2023-03-02 PROCEDURE — 99214 OFFICE O/P EST MOD 30 MIN: CPT

## 2023-03-02 PROCEDURE — 96401 CHEMO ANTI-NEOPL SQ/IM: CPT

## 2023-03-02 PROCEDURE — 96372 THER/PROPH/DIAG INJ SC/IM: CPT

## 2023-03-02 PROCEDURE — 99214 OFFICE O/P EST MOD 30 MIN: CPT | Mod: 25

## 2023-03-02 PROCEDURE — 36415 COLL VENOUS BLD VENIPUNCTURE: CPT

## 2023-03-02 PROCEDURE — 85027 COMPLETE CBC AUTOMATED: CPT

## 2023-03-02 PROCEDURE — 80053 COMPREHEN METABOLIC PANEL: CPT

## 2023-03-02 RX ORDER — LUSPATERCEPT 75 MG/1
110 INJECTION, POWDER, LYOPHILIZED, FOR SOLUTION SUBCUTANEOUS ONCE
Refills: 0 | Status: COMPLETED | OUTPATIENT
Start: 2023-03-02 | End: 2023-03-07

## 2023-03-02 RX ORDER — PREGABALIN 225 MG/1
1000 CAPSULE ORAL ONCE
Refills: 0 | Status: COMPLETED | OUTPATIENT
Start: 2023-03-02 | End: 2023-03-02

## 2023-03-02 RX ADMIN — PREGABALIN 1000 MICROGRAM(S): 225 CAPSULE ORAL at 17:07

## 2023-03-03 DIAGNOSIS — D46.9 MYELODYSPLASTIC SYNDROME, UNSPECIFIED: ICD-10-CM

## 2023-03-03 LAB
ALBUMIN SERPL ELPH-MCNC: 3.8 G/DL
ALP BLD-CCNC: 122 U/L
ALT SERPL-CCNC: 14 U/L
ANION GAP SERPL CALC-SCNC: 14 MMOL/L
AST SERPL-CCNC: 14 U/L
BILIRUB SERPL-MCNC: 0.5 MG/DL
BUN SERPL-MCNC: 29 MG/DL
CALCIUM SERPL-MCNC: 8.7 MG/DL
CHLORIDE SERPL-SCNC: 102 MMOL/L
CO2 SERPL-SCNC: 17 MMOL/L
CREAT SERPL-MCNC: 1.3 MG/DL
EGFR: 42 ML/MIN/1.73M2
GLUCOSE SERPL-MCNC: 387 MG/DL
POTASSIUM SERPL-SCNC: 4.5 MMOL/L
PROT SERPL-MCNC: 5.6 G/DL
SODIUM SERPL-SCNC: 133 MMOL/L

## 2023-03-03 NOTE — PHYSICAL EXAM
[Ambulatory and capable of all self care but unable to carry out any work activities] : Status 2- Ambulatory and capable of all self care but unable to carry out any work activities. Up and about more than 50% of waking hours [Normal] : affect appropriate [de-identified] : In a wheelchair [de-identified] : lower extremity edema, trace on the L, 1-2+on the R [de-identified] : s/p port placement, incisions covered with bandages

## 2023-03-03 NOTE — ASSESSMENT
[FreeTextEntry1] : Patient is a 78 year old female with # Lowrisk myelodysplastic syndrome, previously treated with Revlimid and Procrit and Vidaza . 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 Hgb 8.1 gm/dL. She is due today. \par -- Will hold Procrit today since hgb is stable\par -- Recheck CBC next week and re-assess for Retacrit (Procrit)  80,000 units every week.\par Side effects of Procrit discussed including but not limited to: hypertension, headache, pruritus, nausea, vomiting, injection site pain, arthralgia, cough, fever.\par -- Monitor CBC weekly and administer 1 PRBC unit  as needed when Hgb < 7 gm/dL.  Pt has e/o iron overload so transfusions need to be kept at a minimum if possible.\par \par # Vitamin  B 12 deficiency\par -- Continue Vitamin B 12 injection every month, scheduled dose today.\par \par # Thrombocytosis (controlled)\par -- previously on Hydrea\par -- Will continue to monitor.\par \par # R lower extremity edema\par --negative duplex done in Jan\par --possible Baker's cyst\par --will refer to vascular\par \par Results of  BM biopsy d/w pt. No e/o increased blasts. Pt is needing PRBCs almost every 3 weeks. Advised to follow up with Dr Ferrell to consider any other option or clinical trial. Called Dr Ferrell's office and discussed case with her. No clinical trial available, Rec PRBCS.\par \par RTC in 3 weeks\par CBC check every week\par \par Case was seen and discussed with Dr. Wade who agreed with assessment and plan.\par

## 2023-03-03 NOTE — HISTORY OF PRESENT ILLNESS
[de-identified] : She missed on appointment for procrit last week.\par She starts her next cycle on 6/25/18\par C/o back pain radiating down her left which occurred when she bent to  something.\par No change in diarrhea.\par \par 7/31/18:\par Doing well. On Revlimid for more than 2yrs, 5 mg 2 weeks on 1 week off. She will be starting week on tonight. \par C/o diarrhea. On Imodium 2 pills AM and 2 pills PM. Doesn't help much with diarrhea. \par C/o nausea, abdominal pain. \par \par \par Colonoscopy in 2016 was normal. \par Did not have blood transfusion for over 2 years. \par On procrit [id number] weekly. Missed last week on 7/24/18 as she was out of town. She has been non compliant with weekly Procrit.\par Mammogram in 2017 was normal. \par \par 9/11/18\par pt is here for follow up.\par She feels the lomotil helps with the diarrhea.\par She was away for a few weeks and missed her procrit injections.\par No fever, abdominal  discomfort has been less since since use of lomotil.\par She started a new cycle 2 days ago.\par \par 10/2/18:\par She will start Revlimid tonight (week on). 2 weeks on, 1 week off. \par On ASA 81mg. \par Denies fever, nausea, vomiting, chest pain, SOB, abdominal pain, and bladder problems.\par C/o diarrhea. \par S/p 2 units PRBC transfusion on 9/25/18. \par On Procrit 75182 units weekly. Not compliant every week. \par C/o intermittent dizziness. \par \par 10/31/18\par Pt is here for follow up.\par C/O significant fatigue.\par Continues to have diarrhea, takes imodium and lomotil as needed.\par C/o dry cough, no fever.\par Cough started a month ago. It is worse in the mornings however over last week it has been constant all day.\par No chest pain.\par She was found to have low B12 levels and was started on B12 injections.\par \par 11/27/18:\par Doing well. Hospitalized for 3 days for cellulitis/abcess of the back s/p drainage and on Abx currently. Developed 3 days after Mg infusion as per pt. \par Denies nausea, vomiting, chest pain, SOB, abdominal pain, bowel and bladder problems.\par This is the week on Revlimid (week 1).\par S/p 1 unit PRBC transfusion in the hospital. \par On Procrit weekly \par \par 12/27/18:\par Doing well. No major complaints.\par Denies fever, nausea, vomiting, chest pain, SOB, abdominal pain, and bladder problems.\par On Revlimid 5 mg 2 weeks on 1 week off. This is the week off. She will start the week on sunday (12/30/18).\par Due for Procrit 41001 units today. \par Still has diarrhea. 4-5bm/day.\par On monthly B12 injections. \par \par 1/30/19:\par Patient here for follow up for MDS.  She is taking Revlimid 5 mg, 2 weeks on, 1 week off.  She will complete this current cycle on Saturday.  She has no new complaints today.  Patient denies cough, shortness of breath, denies fever, denies bone pain.\par \par 03/04/2019\par Patient is here for a follow up visit. She complaints of severe pain in her left shoulder and has had abscess drained 2 weeks ago. However the pain is worse and she has purulent discharge on examination. She also has Nasal sores that are painful. Culture from 11/2018 showed MRSA.  Denies Fever. \par She also complaints of swelling in her right leg. Not tender and not erythematous and negative Gogn;s sign. \par Her diarrhea with Revlimid is ongoing and Imodium and Lomotil helps but not everyday. \par She is on 60,000 units of procrit weekly and Revlimid 5 mg 2 weeks on 1 week off. \par \par 4/23/19\par Pt is here for follow up.\par She feels well.\par The nasal sores have improved with bactroban\par The abscess on left shoulder has resolved.\par However she has a new one on the middle of her back.\par No fever.\par Pt has been on procrit 58028 units weekly, however she missed a dose in between.\par \par 5/8/19\par pt is here for follow up.\par no new complaints.\par feels well.\par Her skin abscess has resolved.\par she has been unable to go to ID, as she could not get an appt.\par No bleeding.\par She is compliant with revlimid.\par \par 6/6/19\par Pt is here for follow up.\par no new complaints \par Feels well.\par Is on week 2 of her Revlimid cycle. \par No transfusions since last visit\par \par 7/17/19\par Pt is here for follow up.\par C/O fatigue.\par Was away for a few days two weeks ago, but missed treatment with procrit for 2 weeks.\par Is complaint with Revlimid 2 weeks on 1 week off.\par Diarrhea is a little improved.\par \par 8/14/19\par Patient here for follow up visit, feeling well.  Although she is complaining of some pain at the left scapula area recently.  Patient denies cough, shortness of breath, denies fever, denies other bone pain.  She is off Revlimid  this week, due to restart on Monday.  She is scheduled for Procrit and Vitamin B12 injection today.  She plans to leave the country tomorrow to visit her mother who is ill.  She will return in about 10 days.\par \par 9/11/19\par Pt is here for follow up.\par She was away for 3 weeks, out of which she took procrit for 2 weeks in [place].\par She missed last week's dose.\par She had CBCs in [place] which showed Hgb of 8.9\par She feels well.\par She still getting diarrhea.\par She has been using lomotil with very minimal relief.\par She started a new cycle of Revlimid 4 days ago.\par \par 10/3/19\par Patient here for follow up visit, feeling fairly well.   Patient denies cough, shortness of breath, denies fever, denies other bone pain.  She remains on Revlimid.  She is scheduled for Procrit and Vitamin B12 injection today.\par \par 10/24/19\par Pt is here for follow up.\par Feels well.\par No SOB, fatigue.\par Compliant with procrit and Revl;imid.\par Remains on ASa.\par Diarrhea is unchanged.\par Pt takes imodium as needed.\par \par 11/14/19\par Pt is here for follow up.\par No new complaints.\par Starts a new cycle of Revlimid today.\par Diarrhea is same as before, no rectal bleeding.\par No fever.\par \par 12/5/19\par Pt is here for follow up.\par No new complaints.\par Denies feeling weaker than usual.\par No fever, SOB.\par No change in frequency of diarrhea.\par No pain.\par Will start next cycle of Revlimid in 3 days.\par \par \par !/16/20\par Pt was recently DCed from [facility] 3 days ago after being treated for pneumonia and MARCO.\par She is on po Levaquin.\par She restarted Revlmid 3 days ago.\par She is feeling better now. No fever.\par No SOB, Chest pain and back pain has resolved.\par Pt has a cough, no expectoration.\par She also recd a unit of PRBCs last week in the hospital\par \par \par 1/30/20\par Patient here for follow up visit, feeling well.  She has no new complaints. Cough is almost gone completely.  Patient denies shortness of breath, denies fever, denies bone pain.  Her appetite is ok, weight is stable.  She is due to restart Revlimid on Monday.\par \par 02/20/20\par Pt is here for follow up, feeling well.\par Offers no new complaints. Denies SOB, fever, chills, weight loss, CP, cough. \par Currently off week of Revlimid 5 mg. Restarts on Monday.\par Has procrit 80,000 units today. Next b 12 injection due in 2 weeks \par Did not get chest Xray\par CBC reviewed WBC 3.1, h/h 8.3/25%, plt 386, neutrophils 1.29\par \par 3/12/20\par Pt is here for follow up.\par She took Revlimid for 2 weeks and then has been off for one week although she was advised to take it daily without break.\par No SOB, Cough, fever.\par Has mild fatigue.\par \par 04/02/2020\par SIMONA KUHN a 75 year F is here today for follow up visit of MDS.\par Denies fever, chills, cough,night sweats, weight loss. \par Denies bleeding or bruising.\par Pt receives procrit 80,000 units weekly and B12 IM monthly. \par Continued Revlimid 5 mg daily x 3 weeks, has 1 dose left tonight. \par CBC reviewed: WBC 3.2, H/H 8.1/25.2, neutrophils 1.6, PLT 72\par \par 4/20/2020: The patient is here for follow up . She has no complaints of fever, chills, dizziness , chest pain and shortness of breath . She is still with diarrhea , up to 10 watery BMs per day, responds poorly to imodium and lomotil. She is on Revlimid 5 mg daily , took last dose yesterday night. Her last Procrit was on April 13. \par \par 5/26/20\par Pt is here for follow up.\par She is feeling well. Denies SOB, chest pain, fever.\par Continues to have diarrhea although she is off of Revlmid.\par Thinks it may due to diabetic meds.\par Wants to discuss BM bx results\par \par 6/22/20\par Pt is here for follow up.\par Feels well. Denies any fever, N, V, D\par Continues to have diarrhea, she will talk to her PCP about changing metformin for DM.\par Has been needing PRBCs 1 unit each month\par \par 7/20/20\par Pt is here for follow up.\par No new complaints. Has been feeling well.\par No SOB, CP. \par No N, V, D.\par She has recd 2 units of PRBCs since May 20.\par \par 8/17/20\par Pt is here for a follow up visit.\par She is feeling well.\par No new symptoms. No N, V, D.\par No bruising or bleeding. She continues to need PRBCs every 2-3 weeks.\par \par 8/31/20\par Pt is here for follow up. She is feeling well. No N, V, D.\par She is tolerating the hydrea well. No SOB, CP\par No bruising ior bleeding\par \par 9/8/20\par Pt is here for follow up.\par She had been contacted by the [facility] last week to advise her to stop the hydrea. Pt did not check her messages and continued with Hydrea.\par No fever, N, V, bleeding.\par Saw Dr Ferrell last week.\par \par 9/14/20\par Pt is here for folow up.\par Besides her usual complaint of diarrhea she is feeling well.\par Recd 1 unit PRBC last week.\par Has stopped Hydrea.\par No fever, mouth sores.\par \par 9/29/20\par Pt is here for folow up.\par No new complaints.\par Is seeing GI for diarrhea net week.\par No fever, night sweats.\par REcd 3 units of PRBC this month\par \par 10/13/20\par Pt is here for follow up.\par No new complaints.\par Has not needed a transfusion since 3 weeks ago.\par Continues to have diarrhea, waiting to be seen by GI\par \par 10/26/20\par Pt is here for follow up.\par C/O feeling fatigued.\par Has CLARK.\par No nausea or vomiting.\par No fever.\par Diarrhea has improved a little. She is no longer on Metformin\par \par 11/9/20\par Pt is here for follow up.\par Feels well. Denies SOB, CP, fatigue\par \par 12/7/20- Patient is for follow up and is due for cycle 9 of Vidaza.  She still has loose BM sometimes 10 times a day and was seeing Dr. Corey from GI- did not follow up recently and is unable to get an appt with him. She has lost about 10 lbs since September. No N/V. No fever or chills. No CP/SOB. She has been getting PRBC transfusion every 3 weeks now\par \par 01/04/20 Pt presented today for f/u visit . She is s/p 8 cycles of vidaza and was started on Luspatercept in Dec , 2020 . So far she received 1 injection , she is due for 2nd injection today . Adverse effect profile was discussed with her in detail , she reports having some dizziness and weakness after first injection . She received blood transfusion last wk . Blood work from today reviewed as well and counts are adequate . She denies any fevers,  chills,  or any new symptoms . \par \par 1/25/21\par Pt is here for follow up.\par C/O diarrhea, otherwise feeling better.\par Has not needed a transfusion since 12/29\par \par 2/16/21\par Pt is here for follow up.\par Needed transfusion on 2/8/21.\par Continues to C/o fatigue. Diarrhea remains the same, has not made GI appt as yet.\par \par 3/8/21\par Pt is here for follow up.\par Feels better today. Recd 1 unit PRBCs last week.\par Diarrhea is same as before. has an appt with GI next week.\par No fever\par \par 3/30/21\par Pt is here for follow up.\par Feels better. Last transfusion was on 3/2/21\par Saw GI and was started on cholestyramine and metformin was DCED with resolution of diarrhea.\par She denies any SOB,\par Fatigue is better\par \par 4/20/2021\par Pt is here to f/u for MDS\par She is s/p Luspatercept cycle #6\par She is compliant with Aspirin\par She feels better today, appetite is good\par She denies shortness of breath, fatigue, or weakness \par She c/o of diarrhea but it has improved since she was started on Cholestyramine. Stool is soft and less in frequency\par She received COVID vaccine x 2 doses\par \par 5/11/21\par pt is here for follow up.\par Feels the same with fatigue, diarrhea.\par No SOB\par \par 6/21/21\par Pt is here for follow up.\par Feels well. No SOB, CP, N< V.\par Diarrhea is better controlled now.\par Last PRBC transfusion was 2 weeks ago.\par \par 7/19/21\par Pt is here for follow up.\par Feels a little tired, last transfusion was 6/1/21.\par No bleeding, fever, SOB\par \par 8/10/2021\par Patient is here to follow up for her MDS.\par She is on Luspatercept, tolerating well.\par She feels well today, the appetite is good.\par She denies shortness of breath, fatigue, fever, night sweats, abdominal pain, arthralgias/myalgias.\par \par 8/31/21\par Pt is here for follow up.\par C/O feeling very fatigued.\par No bleeding.\par No fever.\par \par 9/21/2021\par Patient is here to follow up for MDS.\par She is on Luspatercept and Retacrit, tolerating well.\par She gets blood transfusion as needed for Hgb <7 gm/dL\par She is c/o of fatigue, no shortness of breath, dizziness, chills or lightheadedness.\par She denies melena or hematochezia.\par Her appetite is good, no nausea/vomiting.\par \par 10/28/2021\par Patient is here to follow up for MDS.\par She is on Luspatercept and Retacrit, tolerating well.\par She gets blood transfusion to keep Hgb > 7 gm/dL.\par She is c/o of chronic fatigue, no shortness of breath, dizziness, chills or lightheadedness.\par She denies melena or hematochezia.\par Her appetite is good, no nausea/vomiting.\par She c/o of severe diarrhea, 10-12x/day watery stool while on Imodium in the last 2 weeks.\par Last colonoscopy was in 2016, benign as per patient.\par \par 11/18/21\par Pt is here for follow up. C/O fatigue. No SOB, CP\par \par 12/9/21\par Pt is here for follow up.\par Feels better today. Recd 2 units last week in ER.\par Planning to go to [place] for over a week and does not want to miss her procrit dose.\par Diarrhea is stable,\par \par 12/23/21\par Pt is here for follow up.\par Feels better. No SOB, CP. Going to [place] tomorrow. Has not been able to get Procrit injection from the pharmacy yet d/t insurance issues\par \par 2/3/22\par Pt is here for follow up. Feeling same as usual. No SOB, CP. Last transfusion was 2 weeks ago in ER>\par No bleeding reported\par \par 3/3/22\par Pt is here for follow up.\par C/O fatigue. Had black stools X2\par \par 3/30/22\par Pt is feeling well.\par Here for treatment and BM biopsy\par \par 4/21/22\par Pt is here fir follow up.\par Was in ER last week. Was give 2 units of PRBCs.\par Pt feels less tired today.\par Wants to discuss BM rslts\par \par 5/12/22\par Pt is here today for follow up visit.\par Pt c/o feeling tired.  Hgb=6.9.  One unit transfusion scheduled.\par \par 6/23/22\par Pt is here for follow up. Feels well today. No SOB, chest pain.\par Has not made appt with [facility]\par \par 7/14/22\par Pt is here for follow up for lowrisk myelodysplastic syndrome.\par She came in late today because she was not feeling well this morning- dizziness & weakness.\par She denies SOB, CP.\par \par 9/22/22\par Pt is here for follow up.\par C/O fatigue. was in the ER 3 weeks ago and recd 2 U PRBCS\par \par 11/10/22\par Patient is here to follow up for MDS/MPN.\par S/P 2 units pRBC last weekend, feeling much better now.\par She denies shortness of breath, chest pain or headache.\par \par 1/5/2023\par Patient is here to follow up for MDS/MPN, accompanied by spouse and daughter.\par She is feeling much better after she received blood transfusion on 12/29/22.\par She denies shortness of breath, chest pain, dizziness or headache.\par Pt fell on 12/28/22 when she slipped while getting down the stairs and landed on her right knee. Now c/o of severe right knee pain with limited ROM. She has not been evaluated since the incident happened. She is taking Aleeve with minimal relief.\par \par 1/19/2023\par Patient is here to follow up for MDS/MPN and treatment.\par She feel fine, has fatigue not worse than her baseline.\par She denies shortness of breath, chest pain, dizziness, headache or bleeding.\par She was evaluated at the ER on 1/5/2023 for her fall and imaging showed no fracture, except for mild joint effusion.\par \par 2/9/23\par Patient presents for follow up today. She complains of light-headedness. Her BP is elevated to 180/70 mmHg. She does not take any meds and says she is not diagnosed with HTN. She is due for Procrit & Luspatercept today. Reports her energy level is at baseline. No other complaints today.\par Her CMP showed serum glucose > 600 mg/dl, she was sent to ER with the transport.\par \par 3/2/32\par Patient here for follow up for MDS.\par She is scheduled for next luspatercept injection.\par She is status post port placement yesterday, so she has some discomfort at chest area from that.\par She feels somewhat weak, appetite is adequate, weight is stable.\par She is under the care of endocrinologist to try to get her DM under better control.\par She was recommended to take ASA 81 mg when she was discharged from the hospital. [de-identified] : This is a 75 year old  female with history of MDS. She's was previously treated with Revlimid 5 mg, 2 weeks on, 1 week off.  \par She is also on Procrit.\par  She underwent colonoscopy (2/2017)  Results noted no colitis, only hyperplastic polyp noted. \par  \par

## 2023-03-03 NOTE — REVIEW OF SYSTEMS
[Fatigue] : fatigue [Dizziness] : dizziness [Negative] : Musculoskeletal [Recent Change In Weight] : ~T no recent weight change [Shortness Of Breath] : no shortness of breath [Diarrhea] : no diarrhea

## 2023-03-06 DIAGNOSIS — D64.9 ANEMIA, UNSPECIFIED: ICD-10-CM

## 2023-03-06 DIAGNOSIS — Z51.11 ENCOUNTER FOR ANTINEOPLASTIC CHEMOTHERAPY: ICD-10-CM

## 2023-03-07 ENCOUNTER — OUTPATIENT (OUTPATIENT)
Dept: OUTPATIENT SERVICES | Facility: HOSPITAL | Age: 78
LOS: 1 days | End: 2023-03-07
Payer: MEDICARE

## 2023-03-07 DIAGNOSIS — D46.9 MYELODYSPLASTIC SYNDROME, UNSPECIFIED: ICD-10-CM

## 2023-03-07 DIAGNOSIS — E53.8 DEFICIENCY OF OTHER SPECIFIED B GROUP VITAMINS: ICD-10-CM

## 2023-03-07 DIAGNOSIS — Z98.890 OTHER SPECIFIED POSTPROCEDURAL STATES: Chronic | ICD-10-CM

## 2023-03-07 DIAGNOSIS — D64.9 ANEMIA, UNSPECIFIED: ICD-10-CM

## 2023-03-07 DIAGNOSIS — Z51.11 ENCOUNTER FOR ANTINEOPLASTIC CHEMOTHERAPY: ICD-10-CM

## 2023-03-07 PROCEDURE — 96401 CHEMO ANTI-NEOPL SQ/IM: CPT

## 2023-03-07 RX ADMIN — LUSPATERCEPT 110 MILLIGRAM(S): 75 INJECTION, POWDER, LYOPHILIZED, FOR SOLUTION SUBCUTANEOUS at 13:58

## 2023-03-08 ENCOUNTER — NON-APPOINTMENT (OUTPATIENT)
Age: 78
End: 2023-03-08

## 2023-03-09 ENCOUNTER — NON-APPOINTMENT (OUTPATIENT)
Age: 78
End: 2023-03-09

## 2023-03-09 ENCOUNTER — OUTPATIENT (OUTPATIENT)
Dept: OUTPATIENT SERVICES | Facility: HOSPITAL | Age: 78
LOS: 1 days | End: 2023-03-09
Payer: MEDICARE

## 2023-03-09 ENCOUNTER — APPOINTMENT (OUTPATIENT)
Dept: INFUSION THERAPY | Facility: CLINIC | Age: 78
End: 2023-03-09

## 2023-03-09 ENCOUNTER — LABORATORY RESULT (OUTPATIENT)
Age: 78
End: 2023-03-09

## 2023-03-09 DIAGNOSIS — Z51.11 ENCOUNTER FOR ANTINEOPLASTIC CHEMOTHERAPY: ICD-10-CM

## 2023-03-09 DIAGNOSIS — D46.9 MYELODYSPLASTIC SYNDROME, UNSPECIFIED: ICD-10-CM

## 2023-03-09 DIAGNOSIS — D64.9 ANEMIA, UNSPECIFIED: ICD-10-CM

## 2023-03-09 DIAGNOSIS — Z98.890 OTHER SPECIFIED POSTPROCEDURAL STATES: Chronic | ICD-10-CM

## 2023-03-09 LAB
HCT VFR BLD CALC: 19.9 %
HGB BLD-MCNC: 6.7 G/DL
MCHC RBC-ENTMCNC: 29.6 PG
MCHC RBC-ENTMCNC: 33.7 G/DL
MCV RBC AUTO: 88.1 FL
PLATELET # BLD AUTO: 392 K/UL
PMV BLD: 11.2 FL
RBC # BLD: 2.26 M/UL
RBC # FLD: 18 %
WBC # FLD AUTO: 7.13 K/UL

## 2023-03-09 PROCEDURE — 96372 THER/PROPH/DIAG INJ SC/IM: CPT

## 2023-03-09 PROCEDURE — 86900 BLOOD TYPING SEROLOGIC ABO: CPT

## 2023-03-09 PROCEDURE — 85027 COMPLETE CBC AUTOMATED: CPT

## 2023-03-09 PROCEDURE — 86923 COMPATIBILITY TEST ELECTRIC: CPT

## 2023-03-09 PROCEDURE — 86850 RBC ANTIBODY SCREEN: CPT

## 2023-03-09 PROCEDURE — 86901 BLOOD TYPING SEROLOGIC RH(D): CPT

## 2023-03-09 PROCEDURE — 36415 COLL VENOUS BLD VENIPUNCTURE: CPT

## 2023-03-09 RX ORDER — ERYTHROPOIETIN 10000 [IU]/ML
80000 INJECTION, SOLUTION INTRAVENOUS; SUBCUTANEOUS ONCE
Refills: 0 | Status: COMPLETED | OUTPATIENT
Start: 2023-03-09 | End: 2023-03-09

## 2023-03-09 RX ADMIN — ERYTHROPOIETIN 80000 UNIT(S): 10000 INJECTION, SOLUTION INTRAVENOUS; SUBCUTANEOUS at 16:28

## 2023-03-10 ENCOUNTER — OUTPATIENT (OUTPATIENT)
Dept: OUTPATIENT SERVICES | Facility: HOSPITAL | Age: 78
LOS: 1 days | End: 2023-03-10
Payer: MEDICARE

## 2023-03-10 ENCOUNTER — NON-APPOINTMENT (OUTPATIENT)
Age: 78
End: 2023-03-10

## 2023-03-10 ENCOUNTER — APPOINTMENT (OUTPATIENT)
Dept: INFUSION THERAPY | Facility: CLINIC | Age: 78
End: 2023-03-10

## 2023-03-10 DIAGNOSIS — Z98.890 OTHER SPECIFIED POSTPROCEDURAL STATES: Chronic | ICD-10-CM

## 2023-03-10 DIAGNOSIS — Z51.11 ENCOUNTER FOR ANTINEOPLASTIC CHEMOTHERAPY: ICD-10-CM

## 2023-03-10 DIAGNOSIS — D46.9 MYELODYSPLASTIC SYNDROME, UNSPECIFIED: ICD-10-CM

## 2023-03-10 DIAGNOSIS — E53.8 DEFICIENCY OF OTHER SPECIFIED B GROUP VITAMINS: ICD-10-CM

## 2023-03-10 DIAGNOSIS — D64.9 ANEMIA, UNSPECIFIED: ICD-10-CM

## 2023-03-10 PROCEDURE — P9040: CPT

## 2023-03-10 PROCEDURE — 36430 TRANSFUSION BLD/BLD COMPNT: CPT

## 2023-03-13 LAB
ABO + RH PNL BLD: NORMAL
BLD GP AB SCN SERPL QL: NORMAL

## 2023-03-16 ENCOUNTER — APPOINTMENT (OUTPATIENT)
Dept: INFUSION THERAPY | Facility: CLINIC | Age: 78
End: 2023-03-16

## 2023-03-16 ENCOUNTER — LABORATORY RESULT (OUTPATIENT)
Age: 78
End: 2023-03-16

## 2023-03-16 ENCOUNTER — NON-APPOINTMENT (OUTPATIENT)
Age: 78
End: 2023-03-16

## 2023-03-16 ENCOUNTER — OUTPATIENT (OUTPATIENT)
Dept: OUTPATIENT SERVICES | Facility: HOSPITAL | Age: 78
LOS: 1 days | End: 2023-03-16
Payer: MEDICARE

## 2023-03-16 DIAGNOSIS — D64.9 ANEMIA, UNSPECIFIED: ICD-10-CM

## 2023-03-16 DIAGNOSIS — D46.9 MYELODYSPLASTIC SYNDROME, UNSPECIFIED: ICD-10-CM

## 2023-03-16 DIAGNOSIS — Z51.11 ENCOUNTER FOR ANTINEOPLASTIC CHEMOTHERAPY: ICD-10-CM

## 2023-03-16 DIAGNOSIS — Z98.890 OTHER SPECIFIED POSTPROCEDURAL STATES: Chronic | ICD-10-CM

## 2023-03-16 PROCEDURE — 36415 COLL VENOUS BLD VENIPUNCTURE: CPT

## 2023-03-16 PROCEDURE — 96372 THER/PROPH/DIAG INJ SC/IM: CPT

## 2023-03-16 PROCEDURE — 86900 BLOOD TYPING SEROLOGIC ABO: CPT

## 2023-03-16 PROCEDURE — 86850 RBC ANTIBODY SCREEN: CPT

## 2023-03-16 PROCEDURE — 86923 COMPATIBILITY TEST ELECTRIC: CPT

## 2023-03-16 PROCEDURE — 86901 BLOOD TYPING SEROLOGIC RH(D): CPT

## 2023-03-16 PROCEDURE — 85027 COMPLETE CBC AUTOMATED: CPT

## 2023-03-16 RX ORDER — ERYTHROPOIETIN 10000 [IU]/ML
80000 INJECTION, SOLUTION INTRAVENOUS; SUBCUTANEOUS ONCE
Refills: 0 | Status: COMPLETED | OUTPATIENT
Start: 2023-03-16 | End: 2023-03-16

## 2023-03-16 RX ADMIN — ERYTHROPOIETIN 80000 UNIT(S): 10000 INJECTION, SOLUTION INTRAVENOUS; SUBCUTANEOUS at 17:03

## 2023-03-17 ENCOUNTER — APPOINTMENT (OUTPATIENT)
Dept: INFUSION THERAPY | Facility: CLINIC | Age: 78
End: 2023-03-17

## 2023-03-20 LAB
ABO + RH PNL BLD: NORMAL
BLD GP AB SCN SERPL QL: NORMAL
HCT VFR BLD CALC: 22.8 %
HGB BLD-MCNC: 7.6 G/DL
MCHC RBC-ENTMCNC: 30.8 PG
MCHC RBC-ENTMCNC: 33.3 G/DL
MCV RBC AUTO: 92.3 FL
PLATELET # BLD AUTO: 345 K/UL
PMV BLD: 10.2 FL
RBC # BLD: 2.47 M/UL
RBC # FLD: 17.2 %
WBC # FLD AUTO: 7.04 K/UL

## 2023-03-23 ENCOUNTER — APPOINTMENT (OUTPATIENT)
Dept: HEMATOLOGY ONCOLOGY | Facility: CLINIC | Age: 78
End: 2023-03-23
Payer: MEDICARE

## 2023-03-23 ENCOUNTER — APPOINTMENT (OUTPATIENT)
Dept: INFUSION THERAPY | Facility: CLINIC | Age: 78
End: 2023-03-23
Payer: MEDICARE

## 2023-03-23 ENCOUNTER — LABORATORY RESULT (OUTPATIENT)
Age: 78
End: 2023-03-23

## 2023-03-23 ENCOUNTER — OUTPATIENT (OUTPATIENT)
Dept: OUTPATIENT SERVICES | Facility: HOSPITAL | Age: 78
LOS: 1 days | End: 2023-03-23
Payer: MEDICARE

## 2023-03-23 ENCOUNTER — NON-APPOINTMENT (OUTPATIENT)
Age: 78
End: 2023-03-23

## 2023-03-23 VITALS — OXYGEN SATURATION: 99 % | TEMPERATURE: 98.1 F | HEART RATE: 95 BPM

## 2023-03-23 DIAGNOSIS — Z98.890 OTHER SPECIFIED POSTPROCEDURAL STATES: Chronic | ICD-10-CM

## 2023-03-23 DIAGNOSIS — Z51.11 ENCOUNTER FOR ANTINEOPLASTIC CHEMOTHERAPY: ICD-10-CM

## 2023-03-23 DIAGNOSIS — D46.9 MYELODYSPLASTIC SYNDROME, UNSPECIFIED: ICD-10-CM

## 2023-03-23 DIAGNOSIS — E53.8 DEFICIENCY OF OTHER SPECIFIED B GROUP VITAMINS: ICD-10-CM

## 2023-03-23 DIAGNOSIS — D64.9 ANEMIA, UNSPECIFIED: ICD-10-CM

## 2023-03-23 LAB
HCT VFR BLD CALC: 21.1 %
HGB BLD-MCNC: 7.1 G/DL
MCHC RBC-ENTMCNC: 30.9 PG
MCHC RBC-ENTMCNC: 33.6 G/DL
MCV RBC AUTO: 91.7 FL
PLATELET # BLD AUTO: 424 K/UL
PMV BLD: 10 FL
RBC # BLD: 2.3 M/UL
RBC # FLD: 17.6 %
WBC # FLD AUTO: 5.79 K/UL

## 2023-03-23 PROCEDURE — 96372 THER/PROPH/DIAG INJ SC/IM: CPT

## 2023-03-23 PROCEDURE — 36415 COLL VENOUS BLD VENIPUNCTURE: CPT

## 2023-03-23 PROCEDURE — 99214 OFFICE O/P EST MOD 30 MIN: CPT

## 2023-03-23 PROCEDURE — 86923 COMPATIBILITY TEST ELECTRIC: CPT

## 2023-03-23 PROCEDURE — 99214 OFFICE O/P EST MOD 30 MIN: CPT | Mod: 25

## 2023-03-23 PROCEDURE — 85027 COMPLETE CBC AUTOMATED: CPT

## 2023-03-23 PROCEDURE — 86901 BLOOD TYPING SEROLOGIC RH(D): CPT

## 2023-03-23 PROCEDURE — 86850 RBC ANTIBODY SCREEN: CPT

## 2023-03-23 PROCEDURE — 86900 BLOOD TYPING SEROLOGIC ABO: CPT

## 2023-03-23 RX ORDER — LUSPATERCEPT 75 MG/1
110 INJECTION, POWDER, LYOPHILIZED, FOR SOLUTION SUBCUTANEOUS ONCE
Refills: 0 | Status: COMPLETED | OUTPATIENT
Start: 2023-03-23 | End: 2023-03-23

## 2023-03-23 RX ADMIN — LUSPATERCEPT 110 MILLIGRAM(S): 75 INJECTION, POWDER, LYOPHILIZED, FOR SOLUTION SUBCUTANEOUS at 17:27

## 2023-03-24 ENCOUNTER — APPOINTMENT (OUTPATIENT)
Dept: INFUSION THERAPY | Facility: CLINIC | Age: 78
End: 2023-03-24

## 2023-03-24 ENCOUNTER — NON-APPOINTMENT (OUTPATIENT)
Age: 78
End: 2023-03-24

## 2023-03-24 LAB
ABO + RH PNL BLD: NORMAL
BLD GP AB SCN SERPL QL: NORMAL

## 2023-03-24 NOTE — REVIEW OF SYSTEMS
[Fatigue] : fatigue [Dizziness] : dizziness [Negative] : Allergic/Immunologic [Recent Change In Weight] : ~T no recent weight change [Shortness Of Breath] : no shortness of breath [Diarrhea] : no diarrhea

## 2023-03-24 NOTE — PHYSICAL EXAM
[Ambulatory and capable of all self care but unable to carry out any work activities] : Status 2- Ambulatory and capable of all self care but unable to carry out any work activities. Up and about more than 50% of waking hours [Normal] : affect appropriate [de-identified] : In a wheelchair [de-identified] : lower extremity edema, trace on the L, 1-2+on the R [de-identified] : s/p port placement, incisions covered with bandages

## 2023-03-24 NOTE — ASSESSMENT
[FreeTextEntry1] : Patient is a 78 year old female with # Lowrisk myelodysplastic syndrome, previously treated with Revlimid and Procrit and Vidaza. 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.\par Patient is s/p  9 cycles of Vidaza and she was requiring pRBC transfusion every 3 weeks \par Given the persistent transfusion dependence she was switched to Luspatercept.\par \par DETECTED GENOMIC ALTERATIONS:\par Tier III: Variants of Unknown Clinical Significance\par SF3B1 [id number]\par \par Hyperkalemia likely due to increased platelet; pseudohyperkalemia.\par \par S/P port placement on 3/1/23.\par \par PLAN:\par Previous notes reviewed and all relevant laboratory results discussed with Dr Wade and were communicated to the patient and her family.\par \par -- Continue Luspatercept 1.75 mg/kg every 3 weeks, due today. Hgb 7.1 gm/dL. \par -- Recheck CBC next week and re-assess for PRBCS.\par Hold procrit for now\par Side effects of Procrit discussed including but not limited to: hypertension, headache, pruritus, nausea, vomiting, injection site pain, arthralgia, cough, fever.\par -- Monitor CBC weekly and administer 1 PRBC unit  as needed when Hgb < 7 gm/dL.  Pt has e/o iron overload so transfusions need to be kept at a minimum if possible.\par \par # Vitamin  B 12 deficiency\par -- Continue Vitamin B 12 injection every month, due today.\par \par # Thrombocytosis (controlled)\par -- previously on Hydrea.\par -- Will continue to monitor.\par \par # Right lower extremity edema\par --negative duplex done in Jan\par --possible Baker's cyst\par --will refer to vascular\par \par Results of  BM biopsy d/w pt. No e/o increased blasts. Pt is needing PRBCs almost every 3 weeks. Advised to follow up with Dr Ferrell to consider any other option or clinical trial. Called Dr Ferrell's office and discussed case with her. No clinical trial available, Rec PRBCS.\par \par RTC in 3 weeks\par \par

## 2023-03-24 NOTE — HISTORY OF PRESENT ILLNESS
[de-identified] : She missed on appointment for procrit last week.\par She starts her next cycle on 6/25/18\par C/o back pain radiating down her left which occurred when she bent to  something.\par No change in diarrhea.\par \par 7/31/18:\par Doing well. On Revlimid for more than 2yrs, 5 mg 2 weeks on 1 week off. She will be starting week on tonight. \par C/o diarrhea. On Imodium 2 pills AM and 2 pills PM. Doesn't help much with diarrhea. \par C/o nausea, abdominal pain. \par \par \par Colonoscopy in 2016 was normal. \par Did not have blood transfusion for over 2 years. \par On procrit [id number] weekly. Missed last week on 7/24/18 as she was out of town. She has been non compliant with weekly Procrit.\par Mammogram in 2017 was normal. \par \par 9/11/18\par pt is here for follow up.\par She feels the lomotil helps with the diarrhea.\par She was away for a few weeks and missed her procrit injections.\par No fever, abdominal  discomfort has been less since since use of lomotil.\par She started a new cycle 2 days ago.\par \par 10/2/18:\par She will start Revlimid tonight (week on). 2 weeks on, 1 week off. \par On ASA 81mg. \par Denies fever, nausea, vomiting, chest pain, SOB, abdominal pain, and bladder problems.\par C/o diarrhea. \par S/p 2 units PRBC transfusion on 9/25/18. \par On Procrit 32587 units weekly. Not compliant every week. \par C/o intermittent dizziness. \par \par 10/31/18\par Pt is here for follow up.\par C/O significant fatigue.\par Continues to have diarrhea, takes imodium and lomotil as needed.\par C/o dry cough, no fever.\par Cough started a month ago. It is worse in the mornings however over last week it has been constant all day.\par No chest pain.\par She was found to have low B12 levels and was started on B12 injections.\par \par 11/27/18:\par Doing well. Hospitalized for 3 days for cellulitis/abcess of the back s/p drainage and on Abx currently. Developed 3 days after Mg infusion as per pt. \par Denies nausea, vomiting, chest pain, SOB, abdominal pain, bowel and bladder problems.\par This is the week on Revlimid (week 1).\par S/p 1 unit PRBC transfusion in the hospital. \par On Procrit weekly \par \par 12/27/18:\par Doing well. No major complaints.\par Denies fever, nausea, vomiting, chest pain, SOB, abdominal pain, and bladder problems.\par On Revlimid 5 mg 2 weeks on 1 week off. This is the week off. She will start the week on sunday (12/30/18).\par Due for Procrit 15818 units today. \par Still has diarrhea. 4-5bm/day.\par On monthly B12 injections. \par \par 1/30/19:\par Patient here for follow up for MDS.  She is taking Revlimid 5 mg, 2 weeks on, 1 week off.  She will complete this current cycle on Saturday.  She has no new complaints today.  Patient denies cough, shortness of breath, denies fever, denies bone pain.\par \par 03/04/2019\par Patient is here for a follow up visit. She complaints of severe pain in her left shoulder and has had abscess drained 2 weeks ago. However the pain is worse and she has purulent discharge on examination. She also has Nasal sores that are painful. Culture from 11/2018 showed MRSA.  Denies Fever. \par She also complaints of swelling in her right leg. Not tender and not erythematous and negative Gong;s sign. \par Her diarrhea with Revlimid is ongoing and Imodium and Lomotil helps but not everyday. \par She is on 60,000 units of procrit weekly and Revlimid 5 mg 2 weeks on 1 week off. \par \par 4/23/19\par Pt is here for follow up.\par She feels well.\par The nasal sores have improved with bactroban\par The abscess on left shoulder has resolved.\par However she has a new one on the middle of her back.\par No fever.\par Pt has been on procrit 03566 units weekly, however she missed a dose in between.\par \par 5/8/19\par pt is here for follow up.\par no new complaints.\par feels well.\par Her skin abscess has resolved.\par she has been unable to go to ID, as she could not get an appt.\par No bleeding.\par She is compliant with revlimid.\par \par 6/6/19\par Pt is here for follow up.\par no new complaints \par Feels well.\par Is on week 2 of her Revlimid cycle. \par No transfusions since last visit\par \par 7/17/19\par Pt is here for follow up.\par C/O fatigue.\par Was away for a few days two weeks ago, but missed treatment with procrit for 2 weeks.\par Is complaint with Revlimid 2 weeks on 1 week off.\par Diarrhea is a little improved.\par \par 8/14/19\par Patient here for follow up visit, feeling well.  Although she is complaining of some pain at the left scapula area recently.  Patient denies cough, shortness of breath, denies fever, denies other bone pain.  She is off Revlimid  this week, due to restart on Monday.  She is scheduled for Procrit and Vitamin B12 injection today.  She plans to leave the country tomorrow to visit her mother who is ill.  She will return in about 10 days.\par \par 9/11/19\par Pt is here for follow up.\par She was away for 3 weeks, out of which she took procrit for 2 weeks in [place].\par She missed last week's dose.\par She had CBCs in [place] which showed Hgb of 8.9\par She feels well.\par She still getting diarrhea.\par She has been using lomotil with very minimal relief.\par She started a new cycle of Revlimid 4 days ago.\par \par 10/3/19\par Patient here for follow up visit, feeling fairly well.   Patient denies cough, shortness of breath, denies fever, denies other bone pain.  She remains on Revlimid.  She is scheduled for Procrit and Vitamin B12 injection today.\par \par 10/24/19\par Pt is here for follow up.\par Feels well.\par No SOB, fatigue.\par Compliant with procrit and Revl;imid.\par Remains on ASa.\par Diarrhea is unchanged.\par Pt takes imodium as needed.\par \par 11/14/19\par Pt is here for follow up.\par No new complaints.\par Starts a new cycle of Revlimid today.\par Diarrhea is same as before, no rectal bleeding.\par No fever.\par \par 12/5/19\par Pt is here for follow up.\par No new complaints.\par Denies feeling weaker than usual.\par No fever, SOB.\par No change in frequency of diarrhea.\par No pain.\par Will start next cycle of Revlimid in 3 days.\par \par \par !/16/20\par Pt was recently DCed from [facility] 3 days ago after being treated for pneumonia and MARCO.\par She is on po Levaquin.\par She restarted Revlmid 3 days ago.\par She is feeling better now. No fever.\par No SOB, Chest pain and back pain has resolved.\par Pt has a cough, no expectoration.\par She also recd a unit of PRBCs last week in the hospital\par \par \par 1/30/20\par Patient here for follow up visit, feeling well.  She has no new complaints. Cough is almost gone completely.  Patient denies shortness of breath, denies fever, denies bone pain.  Her appetite is ok, weight is stable.  She is due to restart Revlimid on Monday.\par \par 02/20/20\par Pt is here for follow up, feeling well.\par Offers no new complaints. Denies SOB, fever, chills, weight loss, CP, cough. \par Currently off week of Revlimid 5 mg. Restarts on Monday.\par Has procrit 80,000 units today. Next b 12 injection due in 2 weeks \par Did not get chest Xray\par CBC reviewed WBC 3.1, h/h 8.3/25%, plt 386, neutrophils 1.29\par \par 3/12/20\par Pt is here for follow up.\par She took Revlimid for 2 weeks and then has been off for one week although she was advised to take it daily without break.\par No SOB, Cough, fever.\par Has mild fatigue.\par \par 04/02/2020\par SIMONA KUHN a 75 year F is here today for follow up visit of MDS.\par Denies fever, chills, cough,night sweats, weight loss. \par Denies bleeding or bruising.\par Pt receives procrit 80,000 units weekly and B12 IM monthly. \par Continued Revlimid 5 mg daily x 3 weeks, has 1 dose left tonight. \par CBC reviewed: WBC 3.2, H/H 8.1/25.2, neutrophils 1.6, PLT 72\par \par 4/20/2020: The patient is here for follow up . She has no complaints of fever, chills, dizziness , chest pain and shortness of breath . She is still with diarrhea , up to 10 watery BMs per day, responds poorly to imodium and lomotil. She is on Revlimid 5 mg daily , took last dose yesterday night. Her last Procrit was on April 13. \par \par 5/26/20\par Pt is here for follow up.\par She is feeling well. Denies SOB, chest pain, fever.\par Continues to have diarrhea although she is off of Revlmid.\par Thinks it may due to diabetic meds.\par Wants to discuss BM bx results\par \par 6/22/20\par Pt is here for follow up.\par Feels well. Denies any fever, N, V, D\par Continues to have diarrhea, she will talk to her PCP about changing metformin for DM.\par Has been needing PRBCs 1 unit each month\par \par 7/20/20\par Pt is here for follow up.\par No new complaints. Has been feeling well.\par No SOB, CP. \par No N, V, D.\par She has recd 2 units of PRBCs since May 20.\par \par 8/17/20\par Pt is here for a follow up visit.\par She is feeling well.\par No new symptoms. No N, V, D.\par No bruising or bleeding. She continues to need PRBCs every 2-3 weeks.\par \par 8/31/20\par Pt is here for follow up. She is feeling well. No N, V, D.\par She is tolerating the hydrea well. No SOB, CP\par No bruising ior bleeding\par \par 9/8/20\par Pt is here for follow up.\par She had been contacted by the [facility] last week to advise her to stop the hydrea. Pt did not check her messages and continued with Hydrea.\par No fever, N, V, bleeding.\par Saw Dr Ferrell last week.\par \par 9/14/20\par Pt is here for folow up.\par Besides her usual complaint of diarrhea she is feeling well.\par Recd 1 unit PRBC last week.\par Has stopped Hydrea.\par No fever, mouth sores.\par \par 9/29/20\par Pt is here for folow up.\par No new complaints.\par Is seeing GI for diarrhea net week.\par No fever, night sweats.\par REcd 3 units of PRBC this month\par \par 10/13/20\par Pt is here for follow up.\par No new complaints.\par Has not needed a transfusion since 3 weeks ago.\par Continues to have diarrhea, waiting to be seen by GI\par \par 10/26/20\par Pt is here for follow up.\par C/O feeling fatigued.\par Has CLARK.\par No nausea or vomiting.\par No fever.\par Diarrhea has improved a little. She is no longer on Metformin\par \par 11/9/20\par Pt is here for follow up.\par Feels well. Denies SOB, CP, fatigue\par \par 12/7/20- Patient is for follow up and is due for cycle 9 of Vidaza.  She still has loose BM sometimes 10 times a day and was seeing Dr. Corey from GI- did not follow up recently and is unable to get an appt with him. She has lost about 10 lbs since September. No N/V. No fever or chills. No CP/SOB. She has been getting PRBC transfusion every 3 weeks now\par \par 01/04/20 Pt presented today for f/u visit . She is s/p 8 cycles of vidaza and was started on Luspatercept in Dec , 2020 . So far she received 1 injection , she is due for 2nd injection today . Adverse effect profile was discussed with her in detail , she reports having some dizziness and weakness after first injection . She received blood transfusion last wk . Blood work from today reviewed as well and counts are adequate . She denies any fevers,  chills,  or any new symptoms . \par \par 1/25/21\par Pt is here for follow up.\par C/O diarrhea, otherwise feeling better.\par Has not needed a transfusion since 12/29\par \par 2/16/21\par Pt is here for follow up.\par Needed transfusion on 2/8/21.\par Continues to C/o fatigue. Diarrhea remains the same, has not made GI appt as yet.\par \par 3/8/21\par Pt is here for follow up.\par Feels better today. Recd 1 unit PRBCs last week.\par Diarrhea is same as before. has an appt with GI next week.\par No fever\par \par 3/30/21\par Pt is here for follow up.\par Feels better. Last transfusion was on 3/2/21\par Saw GI and was started on cholestyramine and metformin was DCED with resolution of diarrhea.\par She denies any SOB,\par Fatigue is better\par \par 4/20/2021\par Pt is here to f/u for MDS\par She is s/p Luspatercept cycle #6\par She is compliant with Aspirin\par She feels better today, appetite is good\par She denies shortness of breath, fatigue, or weakness \par She c/o of diarrhea but it has improved since she was started on Cholestyramine. Stool is soft and less in frequency\par She received COVID vaccine x 2 doses\par \par 5/11/21\par pt is here for follow up.\par Feels the same with fatigue, diarrhea.\par No SOB\par \par 6/21/21\par Pt is here for follow up.\par Feels well. No SOB, CP, N< V.\par Diarrhea is better controlled now.\par Last PRBC transfusion was 2 weeks ago.\par \par 7/19/21\par Pt is here for follow up.\par Feels a little tired, last transfusion was 6/1/21.\par No bleeding, fever, SOB\par \par 8/10/2021\par Patient is here to follow up for her MDS.\par She is on Luspatercept, tolerating well.\par She feels well today, the appetite is good.\par She denies shortness of breath, fatigue, fever, night sweats, abdominal pain, arthralgias/myalgias.\par \par 8/31/21\par Pt is here for follow up.\par C/O feeling very fatigued.\par No bleeding.\par No fever.\par \par 9/21/2021\par Patient is here to follow up for MDS.\par She is on Luspatercept and Retacrit, tolerating well.\par She gets blood transfusion as needed for Hgb <7 gm/dL\par She is c/o of fatigue, no shortness of breath, dizziness, chills or lightheadedness.\par She denies melena or hematochezia.\par Her appetite is good, no nausea/vomiting.\par \par 10/28/2021\par Patient is here to follow up for MDS.\par She is on Luspatercept and Retacrit, tolerating well.\par She gets blood transfusion to keep Hgb > 7 gm/dL.\par She is c/o of chronic fatigue, no shortness of breath, dizziness, chills or lightheadedness.\par She denies melena or hematochezia.\par Her appetite is good, no nausea/vomiting.\par She c/o of severe diarrhea, 10-12x/day watery stool while on Imodium in the last 2 weeks.\par Last colonoscopy was in 2016, benign as per patient.\par \par 11/18/21\par Pt is here for follow up. C/O fatigue. No SOB, CP\par \par 12/9/21\par Pt is here for follow up.\par Feels better today. Recd 2 units last week in ER.\par Planning to go to [place] for over a week and does not want to miss her procrit dose.\par Diarrhea is stable,\par \par 12/23/21\par Pt is here for follow up.\par Feels better. No SOB, CP. Going to [place] tomorrow. Has not been able to get Procrit injection from the pharmacy yet d/t insurance issues\par \par 2/3/22\par Pt is here for follow up. Feeling same as usual. No SOB, CP. Last transfusion was 2 weeks ago in ER>\par No bleeding reported\par \par 3/3/22\par Pt is here for follow up.\par C/O fatigue. Had black stools X2\par \par 3/30/22\par Pt is feeling well.\par Here for treatment and BM biopsy\par \par 4/21/22\par Pt is here fir follow up.\par Was in ER last week. Was give 2 units of PRBCs.\par Pt feels less tired today.\par Wants to discuss BM rslts\par \par 5/12/22\par Pt is here today for follow up visit.\par Pt c/o feeling tired.  Hgb=6.9.  One unit transfusion scheduled.\par \par 6/23/22\par Pt is here for follow up. Feels well today. No SOB, chest pain.\par Has not made appt with [facility]\par \par 7/14/22\par Pt is here for follow up for lowrisk myelodysplastic syndrome.\par She came in late today because she was not feeling well this morning- dizziness & weakness.\par She denies SOB, CP.\par \par 9/22/22\par Pt is here for follow up.\par C/O fatigue. was in the ER 3 weeks ago and recd 2 U PRBCS\par \par 11/10/22\par Patient is here to follow up for MDS/MPN.\par S/P 2 units pRBC last weekend, feeling much better now.\par She denies shortness of breath, chest pain or headache.\par \par 1/5/2023\par Patient is here to follow up for MDS/MPN, accompanied by spouse and daughter.\par She is feeling much better after she received blood transfusion on 12/29/22.\par She denies shortness of breath, chest pain, dizziness or headache.\par Pt fell on 12/28/22 when she slipped while getting down the stairs and landed on her right knee. Now c/o of severe right knee pain with limited ROM. She has not been evaluated since the incident happened. She is taking Aleeve with minimal relief.\par \par 1/19/2023\par Patient is here to follow up for MDS/MPN and treatment.\par She feel fine, has fatigue not worse than her baseline.\par She denies shortness of breath, chest pain, dizziness, headache or bleeding.\par She was evaluated at the ER on 1/5/2023 for her fall and imaging showed no fracture, except for mild joint effusion.\par \par 2/9/23\par Patient presents for follow up today. She complains of light-headedness. Her BP is elevated to 180/70 mmHg. She does not take any meds and says she is not diagnosed with HTN. She is due for Procrit & Luspatercept today. Reports her energy level is at baseline. No other complaints today.\par Her CMP showed serum glucose > 600 mg/dl, she was sent to ER with the transport.\par \par 3/2/32\par Patient here for follow up for MDS.\par She is scheduled for next luspatercept injection.\par She is status post port placement yesterday, so she has some discomfort at chest area from that.\par She feels somewhat weak, appetite is adequate, weight is stable.\par She is under the care of endocrinologist to try to get her DM under better control.\par She was recommended to take ASA 81 mg when she was discharged from the hospital.\par \par 3/23/23\par Patient here for follow up for MDS.\par She is scheduled for next Luspatercept injection.\par She is status post port placement\par She feels somewhat weak, appetite is adequate, weight is stable.\par She is under the care of endocrinologist to try to get her DM under better control.\par She was recommended to take ASA 81 mg when she was discharged from the hospital. [de-identified] : This is a 75 year old  female with history of MDS. She's was previously treated with Revlimid 5 mg, 2 weeks on, 1 week off.  \par She is also on Procrit.\par  She underwent colonoscopy (2/2017)  Results noted no colitis, only hyperplastic polyp noted. \par  \par

## 2023-03-29 ENCOUNTER — NON-APPOINTMENT (OUTPATIENT)
Age: 78
End: 2023-03-29

## 2023-03-30 ENCOUNTER — LABORATORY RESULT (OUTPATIENT)
Age: 78
End: 2023-03-30

## 2023-03-30 ENCOUNTER — APPOINTMENT (OUTPATIENT)
Dept: INFUSION THERAPY | Facility: CLINIC | Age: 78
End: 2023-03-30

## 2023-03-30 ENCOUNTER — OUTPATIENT (OUTPATIENT)
Dept: OUTPATIENT SERVICES | Facility: HOSPITAL | Age: 78
LOS: 1 days | End: 2023-03-30
Payer: MEDICARE

## 2023-03-30 DIAGNOSIS — D46.9 MYELODYSPLASTIC SYNDROME, UNSPECIFIED: ICD-10-CM

## 2023-03-30 DIAGNOSIS — Z51.11 ENCOUNTER FOR ANTINEOPLASTIC CHEMOTHERAPY: ICD-10-CM

## 2023-03-30 DIAGNOSIS — D64.9 ANEMIA, UNSPECIFIED: ICD-10-CM

## 2023-03-30 DIAGNOSIS — Z98.890 OTHER SPECIFIED POSTPROCEDURAL STATES: Chronic | ICD-10-CM

## 2023-03-30 PROCEDURE — 96372 THER/PROPH/DIAG INJ SC/IM: CPT

## 2023-03-30 PROCEDURE — 86901 BLOOD TYPING SEROLOGIC RH(D): CPT

## 2023-03-30 PROCEDURE — 85027 COMPLETE CBC AUTOMATED: CPT

## 2023-03-30 PROCEDURE — 86900 BLOOD TYPING SEROLOGIC ABO: CPT

## 2023-03-30 PROCEDURE — 86923 COMPATIBILITY TEST ELECTRIC: CPT

## 2023-03-30 PROCEDURE — 86850 RBC ANTIBODY SCREEN: CPT

## 2023-03-30 PROCEDURE — 36415 COLL VENOUS BLD VENIPUNCTURE: CPT

## 2023-03-30 RX ORDER — ERYTHROPOIETIN 10000 [IU]/ML
80000 INJECTION, SOLUTION INTRAVENOUS; SUBCUTANEOUS ONCE
Refills: 0 | Status: COMPLETED | OUTPATIENT
Start: 2023-03-30 | End: 2023-03-30

## 2023-03-30 RX ORDER — PREGABALIN 225 MG/1
1000 CAPSULE ORAL ONCE
Refills: 0 | Status: COMPLETED | OUTPATIENT
Start: 2023-03-30 | End: 2023-03-30

## 2023-03-30 RX ADMIN — ERYTHROPOIETIN 80000 UNIT(S): 10000 INJECTION, SOLUTION INTRAVENOUS; SUBCUTANEOUS at 10:43

## 2023-03-30 RX ADMIN — PREGABALIN 1000 MICROGRAM(S): 225 CAPSULE ORAL at 10:43

## 2023-03-31 ENCOUNTER — APPOINTMENT (OUTPATIENT)
Dept: INFUSION THERAPY | Facility: CLINIC | Age: 78
End: 2023-03-31

## 2023-03-31 ENCOUNTER — OUTPATIENT (OUTPATIENT)
Dept: OUTPATIENT SERVICES | Facility: HOSPITAL | Age: 78
LOS: 1 days | End: 2023-03-31
Payer: MEDICARE

## 2023-03-31 ENCOUNTER — NON-APPOINTMENT (OUTPATIENT)
Age: 78
End: 2023-03-31

## 2023-03-31 DIAGNOSIS — D64.9 ANEMIA, UNSPECIFIED: ICD-10-CM

## 2023-03-31 DIAGNOSIS — D46.9 MYELODYSPLASTIC SYNDROME, UNSPECIFIED: ICD-10-CM

## 2023-03-31 DIAGNOSIS — Z51.11 ENCOUNTER FOR ANTINEOPLASTIC CHEMOTHERAPY: ICD-10-CM

## 2023-03-31 DIAGNOSIS — Z98.890 OTHER SPECIFIED POSTPROCEDURAL STATES: Chronic | ICD-10-CM

## 2023-03-31 PROCEDURE — P9040: CPT

## 2023-03-31 PROCEDURE — 36430 TRANSFUSION BLD/BLD COMPNT: CPT

## 2023-03-31 RX ORDER — FUROSEMIDE 40 MG
20 TABLET ORAL ONCE
Refills: 0 | Status: COMPLETED | OUTPATIENT
Start: 2023-03-31 | End: 2023-03-31

## 2023-03-31 RX ADMIN — Medication 20 MILLIGRAM(S): at 15:53

## 2023-04-04 LAB
ABO + RH PNL BLD: NORMAL
BLD GP AB SCN SERPL QL: NORMAL
HCT VFR BLD CALC: 17.3 %
HGB BLD-MCNC: 5.7 G/DL
MCHC RBC-ENTMCNC: 30.5 PG
MCHC RBC-ENTMCNC: 32.9 G/DL
MCV RBC AUTO: 92.5 FL
PLATELET # BLD AUTO: 327 K/UL
PMV BLD: 10.7 FL
RBC # BLD: 1.87 M/UL
RBC # FLD: 18 %
WBC # FLD AUTO: 7.12 K/UL

## 2023-04-06 ENCOUNTER — NON-APPOINTMENT (OUTPATIENT)
Age: 78
End: 2023-04-06

## 2023-04-06 ENCOUNTER — OUTPATIENT (OUTPATIENT)
Dept: OUTPATIENT SERVICES | Facility: HOSPITAL | Age: 78
LOS: 1 days | End: 2023-04-06
Payer: MEDICARE

## 2023-04-06 ENCOUNTER — APPOINTMENT (OUTPATIENT)
Dept: INFUSION THERAPY | Facility: CLINIC | Age: 78
End: 2023-04-06

## 2023-04-06 ENCOUNTER — LABORATORY RESULT (OUTPATIENT)
Age: 78
End: 2023-04-06

## 2023-04-06 DIAGNOSIS — Z98.890 OTHER SPECIFIED POSTPROCEDURAL STATES: Chronic | ICD-10-CM

## 2023-04-06 DIAGNOSIS — D64.9 ANEMIA, UNSPECIFIED: ICD-10-CM

## 2023-04-06 DIAGNOSIS — D46.9 MYELODYSPLASTIC SYNDROME, UNSPECIFIED: ICD-10-CM

## 2023-04-06 DIAGNOSIS — Z51.11 ENCOUNTER FOR ANTINEOPLASTIC CHEMOTHERAPY: ICD-10-CM

## 2023-04-06 PROCEDURE — 96372 THER/PROPH/DIAG INJ SC/IM: CPT

## 2023-04-06 PROCEDURE — 36415 COLL VENOUS BLD VENIPUNCTURE: CPT

## 2023-04-06 PROCEDURE — 85027 COMPLETE CBC AUTOMATED: CPT

## 2023-04-06 PROCEDURE — 86901 BLOOD TYPING SEROLOGIC RH(D): CPT

## 2023-04-06 PROCEDURE — 86900 BLOOD TYPING SEROLOGIC ABO: CPT

## 2023-04-06 PROCEDURE — 86850 RBC ANTIBODY SCREEN: CPT

## 2023-04-06 RX ORDER — ERYTHROPOIETIN 10000 [IU]/ML
80000 INJECTION, SOLUTION INTRAVENOUS; SUBCUTANEOUS ONCE
Refills: 0 | Status: COMPLETED | OUTPATIENT
Start: 2023-04-06 | End: 2023-04-06

## 2023-04-06 RX ADMIN — ERYTHROPOIETIN 80000 UNIT(S): 10000 INJECTION, SOLUTION INTRAVENOUS; SUBCUTANEOUS at 15:12

## 2023-04-07 ENCOUNTER — APPOINTMENT (OUTPATIENT)
Dept: INFUSION THERAPY | Facility: CLINIC | Age: 78
End: 2023-04-07

## 2023-04-08 ENCOUNTER — OUTPATIENT (OUTPATIENT)
Dept: OUTPATIENT SERVICES | Facility: HOSPITAL | Age: 78
LOS: 1 days | End: 2023-04-08
Payer: MEDICARE

## 2023-04-08 DIAGNOSIS — F03.90 UNSPECIFIED DEMENTIA, UNSPECIFIED SEVERITY, WITHOUT BEHAVIORAL DISTURBANCE, PSYCHOTIC DISTURBANCE, MOOD DISTURBANCE, AND ANXIETY: ICD-10-CM

## 2023-04-08 DIAGNOSIS — Z98.890 OTHER SPECIFIED POSTPROCEDURAL STATES: Chronic | ICD-10-CM

## 2023-04-08 DIAGNOSIS — Z00.8 ENCOUNTER FOR OTHER GENERAL EXAMINATION: ICD-10-CM

## 2023-04-08 DIAGNOSIS — G31.84 MILD COGNITIVE IMPAIRMENT OF UNCERTAIN OR UNKNOWN ETIOLOGY: ICD-10-CM

## 2023-04-08 PROCEDURE — 70551 MRI BRAIN STEM W/O DYE: CPT

## 2023-04-08 PROCEDURE — 70551 MRI BRAIN STEM W/O DYE: CPT | Mod: 26

## 2023-04-09 DIAGNOSIS — F03.90 UNSPECIFIED DEMENTIA, UNSPECIFIED SEVERITY, WITHOUT BEHAVIORAL DISTURBANCE, PSYCHOTIC DISTURBANCE, MOOD DISTURBANCE, AND ANXIETY: ICD-10-CM

## 2023-04-09 DIAGNOSIS — G31.84 MILD COGNITIVE IMPAIRMENT OF UNCERTAIN OR UNKNOWN ETIOLOGY: ICD-10-CM

## 2023-04-10 LAB
ABO + RH PNL BLD: NORMAL
BLD GP AB SCN SERPL QL: NORMAL
HCT VFR BLD CALC: 25.4 %
HGB BLD-MCNC: 8.3 G/DL
MCHC RBC-ENTMCNC: 30 PG
MCHC RBC-ENTMCNC: 32.7 G/DL
MCV RBC AUTO: 91.7 FL
PLATELET # BLD AUTO: 260 K/UL
PMV BLD: 10.3 FL
RBC # BLD: 2.77 M/UL
RBC # FLD: 16.1 %
WBC # FLD AUTO: 5.33 K/UL

## 2023-04-13 ENCOUNTER — APPOINTMENT (OUTPATIENT)
Dept: INFUSION THERAPY | Facility: CLINIC | Age: 78
End: 2023-04-13
Payer: MEDICARE

## 2023-04-13 ENCOUNTER — OUTPATIENT (OUTPATIENT)
Dept: OUTPATIENT SERVICES | Facility: HOSPITAL | Age: 78
LOS: 1 days | End: 2023-04-13
Payer: MEDICARE

## 2023-04-13 ENCOUNTER — APPOINTMENT (OUTPATIENT)
Dept: HEMATOLOGY ONCOLOGY | Facility: CLINIC | Age: 78
End: 2023-04-13
Payer: MEDICARE

## 2023-04-13 ENCOUNTER — LABORATORY RESULT (OUTPATIENT)
Age: 78
End: 2023-04-13

## 2023-04-13 VITALS
BODY MASS INDEX: 25.03 KG/M2 | SYSTOLIC BLOOD PRESSURE: 185 MMHG | TEMPERATURE: 98 F | OXYGEN SATURATION: 99 % | HEIGHT: 62 IN | RESPIRATION RATE: 16 BRPM | HEART RATE: 93 BPM | DIASTOLIC BLOOD PRESSURE: 72 MMHG | WEIGHT: 136 LBS

## 2023-04-13 DIAGNOSIS — Z98.890 OTHER SPECIFIED POSTPROCEDURAL STATES: Chronic | ICD-10-CM

## 2023-04-13 DIAGNOSIS — D46.9 MYELODYSPLASTIC SYNDROME, UNSPECIFIED: ICD-10-CM

## 2023-04-13 DIAGNOSIS — D64.9 ANEMIA, UNSPECIFIED: ICD-10-CM

## 2023-04-13 DIAGNOSIS — Z51.11 ENCOUNTER FOR ANTINEOPLASTIC CHEMOTHERAPY: ICD-10-CM

## 2023-04-13 DIAGNOSIS — E53.8 DEFICIENCY OF OTHER SPECIFIED B GROUP VITAMINS: ICD-10-CM

## 2023-04-13 LAB
HCT VFR BLD CALC: 24 %
HGB BLD-MCNC: 7.5 G/DL
MCHC RBC-ENTMCNC: 29.6 PG
MCHC RBC-ENTMCNC: 31.3 G/DL
MCV RBC AUTO: 94.9 FL
PLATELET # BLD AUTO: 348 K/UL
PMV BLD: 9.5 FL
RBC # BLD: 2.53 M/UL
RBC # FLD: 16.1 %
WBC # FLD AUTO: 6.47 K/UL

## 2023-04-13 PROCEDURE — 99214 OFFICE O/P EST MOD 30 MIN: CPT

## 2023-04-13 PROCEDURE — 80053 COMPREHEN METABOLIC PANEL: CPT

## 2023-04-13 PROCEDURE — 85027 COMPLETE CBC AUTOMATED: CPT

## 2023-04-13 PROCEDURE — 36415 COLL VENOUS BLD VENIPUNCTURE: CPT

## 2023-04-13 PROCEDURE — 96372 THER/PROPH/DIAG INJ SC/IM: CPT

## 2023-04-13 RX ORDER — LUSPATERCEPT 75 MG/1
110 INJECTION, POWDER, LYOPHILIZED, FOR SOLUTION SUBCUTANEOUS ONCE
Refills: 0 | Status: COMPLETED | OUTPATIENT
Start: 2023-04-13 | End: 2023-04-13

## 2023-04-13 RX ADMIN — LUSPATERCEPT 110 MILLIGRAM(S): 75 INJECTION, POWDER, LYOPHILIZED, FOR SOLUTION SUBCUTANEOUS at 13:41

## 2023-04-13 NOTE — HISTORY OF PRESENT ILLNESS
[de-identified] : She missed on appointment for procrit last week.\par She starts her next cycle on 6/25/18\par C/o back pain radiating down her left which occurred when she bent to  something.\par No change in diarrhea.\par \par 7/31/18:\par Doing well. On Revlimid for more than 2yrs, 5 mg 2 weeks on 1 week off. She will be starting week on tonight. \par C/o diarrhea. On Imodium 2 pills AM and 2 pills PM. Doesn't help much with diarrhea. \par C/o nausea, abdominal pain. \par \par \par Colonoscopy in 2016 was normal. \par Did not have blood transfusion for over 2 years. \par On procrit [id number] weekly. Missed last week on 7/24/18 as she was out of town. She has been non compliant with weekly Procrit.\par Mammogram in 2017 was normal. \par \par 9/11/18\par pt is here for follow up.\par She feels the lomotil helps with the diarrhea.\par She was away for a few weeks and missed her procrit injections.\par No fever, abdominal  discomfort has been less since since use of lomotil.\par She started a new cycle 2 days ago.\par \par 10/2/18:\par She will start Revlimid tonight (week on). 2 weeks on, 1 week off. \par On ASA 81mg. \par Denies fever, nausea, vomiting, chest pain, SOB, abdominal pain, and bladder problems.\par C/o diarrhea. \par S/p 2 units PRBC transfusion on 9/25/18. \par On Procrit 02351 units weekly. Not compliant every week. \par C/o intermittent dizziness. \par \par 10/31/18\par Pt is here for follow up.\par C/O significant fatigue.\par Continues to have diarrhea, takes imodium and lomotil as needed.\par C/o dry cough, no fever.\par Cough started a month ago. It is worse in the mornings however over last week it has been constant all day.\par No chest pain.\par She was found to have low B12 levels and was started on B12 injections.\par \par 11/27/18:\par Doing well. Hospitalized for 3 days for cellulitis/abcess of the back s/p drainage and on Abx currently. Developed 3 days after Mg infusion as per pt. \par Denies nausea, vomiting, chest pain, SOB, abdominal pain, bowel and bladder problems.\par This is the week on Revlimid (week 1).\par S/p 1 unit PRBC transfusion in the hospital. \par On Procrit weekly \par \par 12/27/18:\par Doing well. No major complaints.\par Denies fever, nausea, vomiting, chest pain, SOB, abdominal pain, and bladder problems.\par On Revlimid 5 mg 2 weeks on 1 week off. This is the week off. She will start the week on sunday (12/30/18).\par Due for Procrit 77961 units today. \par Still has diarrhea. 4-5bm/day.\par On monthly B12 injections. \par \par 1/30/19:\par Patient here for follow up for MDS.  She is taking Revlimid 5 mg, 2 weeks on, 1 week off.  She will complete this current cycle on Saturday.  She has no new complaints today.  Patient denies cough, shortness of breath, denies fever, denies bone pain.\par \par 03/04/2019\par Patient is here for a follow up visit. She complaints of severe pain in her left shoulder and has had abscess drained 2 weeks ago. However the pain is worse and she has purulent discharge on examination. She also has Nasal sores that are painful. Culture from 11/2018 showed MRSA.  Denies Fever. \par She also complaints of swelling in her right leg. Not tender and not erythematous and negative Gong;s sign. \par Her diarrhea with Revlimid is ongoing and Imodium and Lomotil helps but not everyday. \par She is on 60,000 units of procrit weekly and Revlimid 5 mg 2 weeks on 1 week off. \par \par 4/23/19\par Pt is here for follow up.\par She feels well.\par The nasal sores have improved with bactroban\par The abscess on left shoulder has resolved.\par However she has a new one on the middle of her back.\par No fever.\par Pt has been on procrit 46958 units weekly, however she missed a dose in between.\par \par 5/8/19\par pt is here for follow up.\par no new complaints.\par feels well.\par Her skin abscess has resolved.\par she has been unable to go to ID, as she could not get an appt.\par No bleeding.\par She is compliant with revlimid.\par \par 6/6/19\par Pt is here for follow up.\par no new complaints \par Feels well.\par Is on week 2 of her Revlimid cycle. \par No transfusions since last visit\par \par 7/17/19\par Pt is here for follow up.\par C/O fatigue.\par Was away for a few days two weeks ago, but missed treatment with procrit for 2 weeks.\par Is complaint with Revlimid 2 weeks on 1 week off.\par Diarrhea is a little improved.\par \par 8/14/19\par Patient here for follow up visit, feeling well.  Although she is complaining of some pain at the left scapula area recently.  Patient denies cough, shortness of breath, denies fever, denies other bone pain.  She is off Revlimid  this week, due to restart on Monday.  She is scheduled for Procrit and Vitamin B12 injection today.  She plans to leave the country tomorrow to visit her mother who is ill.  She will return in about 10 days.\par \par 9/11/19\par Pt is here for follow up.\par She was away for 3 weeks, out of which she took procrit for 2 weeks in [place].\par She missed last week's dose.\par She had CBCs in [place] which showed Hgb of 8.9\par She feels well.\par She still getting diarrhea.\par She has been using lomotil with very minimal relief.\par She started a new cycle of Revlimid 4 days ago.\par \par 10/3/19\par Patient here for follow up visit, feeling fairly well.   Patient denies cough, shortness of breath, denies fever, denies other bone pain.  She remains on Revlimid.  She is scheduled for Procrit and Vitamin B12 injection today.\par \par 10/24/19\par Pt is here for follow up.\par Feels well.\par No SOB, fatigue.\par Compliant with procrit and Revl;imid.\par Remains on ASa.\par Diarrhea is unchanged.\par Pt takes imodium as needed.\par \par 11/14/19\par Pt is here for follow up.\par No new complaints.\par Starts a new cycle of Revlimid today.\par Diarrhea is same as before, no rectal bleeding.\par No fever.\par \par 12/5/19\par Pt is here for follow up.\par No new complaints.\par Denies feeling weaker than usual.\par No fever, SOB.\par No change in frequency of diarrhea.\par No pain.\par Will start next cycle of Revlimid in 3 days.\par \par \par !/16/20\par Pt was recently DCed from [facility] 3 days ago after being treated for pneumonia and MARCO.\par She is on po Levaquin.\par She restarted Revlmid 3 days ago.\par She is feeling better now. No fever.\par No SOB, Chest pain and back pain has resolved.\par Pt has a cough, no expectoration.\par She also recd a unit of PRBCs last week in the hospital\par \par \par 1/30/20\par Patient here for follow up visit, feeling well.  She has no new complaints. Cough is almost gone completely.  Patient denies shortness of breath, denies fever, denies bone pain.  Her appetite is ok, weight is stable.  She is due to restart Revlimid on Monday.\par \par 02/20/20\par Pt is here for follow up, feeling well.\par Offers no new complaints. Denies SOB, fever, chills, weight loss, CP, cough. \par Currently off week of Revlimid 5 mg. Restarts on Monday.\par Has procrit 80,000 units today. Next b 12 injection due in 2 weeks \par Did not get chest Xray\par CBC reviewed WBC 3.1, h/h 8.3/25%, plt 386, neutrophils 1.29\par \par 3/12/20\par Pt is here for follow up.\par She took Revlimid for 2 weeks and then has been off for one week although she was advised to take it daily without break.\par No SOB, Cough, fever.\par Has mild fatigue.\par \par 04/02/2020\par SIMONA KUHN a 75 year F is here today for follow up visit of MDS.\par Denies fever, chills, cough,night sweats, weight loss. \par Denies bleeding or bruising.\par Pt receives procrit 80,000 units weekly and B12 IM monthly. \par Continued Revlimid 5 mg daily x 3 weeks, has 1 dose left tonight. \par CBC reviewed: WBC 3.2, H/H 8.1/25.2, neutrophils 1.6, PLT 72\par \par 4/20/2020: The patient is here for follow up . She has no complaints of fever, chills, dizziness , chest pain and shortness of breath . She is still with diarrhea , up to 10 watery BMs per day, responds poorly to imodium and lomotil. She is on Revlimid 5 mg daily , took last dose yesterday night. Her last Procrit was on April 13. \par \par 5/26/20\par Pt is here for follow up.\par She is feeling well. Denies SOB, chest pain, fever.\par Continues to have diarrhea although she is off of Revlmid.\par Thinks it may due to diabetic meds.\par Wants to discuss BM bx results\par \par 6/22/20\par Pt is here for follow up.\par Feels well. Denies any fever, N, V, D\par Continues to have diarrhea, she will talk to her PCP about changing metformin for DM.\par Has been needing PRBCs 1 unit each month\par \par 7/20/20\par Pt is here for follow up.\par No new complaints. Has been feeling well.\par No SOB, CP. \par No N, V, D.\par She has recd 2 units of PRBCs since May 20.\par \par 8/17/20\par Pt is here for a follow up visit.\par She is feeling well.\par No new symptoms. No N, V, D.\par No bruising or bleeding. She continues to need PRBCs every 2-3 weeks.\par \par 8/31/20\par Pt is here for follow up. She is feeling well. No N, V, D.\par She is tolerating the hydrea well. No SOB, CP\par No bruising ior bleeding\par \par 9/8/20\par Pt is here for follow up.\par She had been contacted by the [facility] last week to advise her to stop the hydrea. Pt did not check her messages and continued with Hydrea.\par No fever, N, V, bleeding.\par Saw Dr Ferrell last week.\par \par 9/14/20\par Pt is here for folow up.\par Besides her usual complaint of diarrhea she is feeling well.\par Recd 1 unit PRBC last week.\par Has stopped Hydrea.\par No fever, mouth sores.\par \par 9/29/20\par Pt is here for folow up.\par No new complaints.\par Is seeing GI for diarrhea net week.\par No fever, night sweats.\par REcd 3 units of PRBC this month\par \par 10/13/20\par Pt is here for follow up.\par No new complaints.\par Has not needed a transfusion since 3 weeks ago.\par Continues to have diarrhea, waiting to be seen by GI\par \par 10/26/20\par Pt is here for follow up.\par C/O feeling fatigued.\par Has CLARK.\par No nausea or vomiting.\par No fever.\par Diarrhea has improved a little. She is no longer on Metformin\par \par 11/9/20\par Pt is here for follow up.\par Feels well. Denies SOB, CP, fatigue\par \par 12/7/20- Patient is for follow up and is due for cycle 9 of Vidaza.  She still has loose BM sometimes 10 times a day and was seeing Dr. Corey from GI- did not follow up recently and is unable to get an appt with him. She has lost about 10 lbs since September. No N/V. No fever or chills. No CP/SOB. She has been getting PRBC transfusion every 3 weeks now\par \par 01/04/20 Pt presented today for f/u visit . She is s/p 8 cycles of vidaza and was started on Luspatercept in Dec , 2020 . So far she received 1 injection , she is due for 2nd injection today . Adverse effect profile was discussed with her in detail , she reports having some dizziness and weakness after first injection . She received blood transfusion last wk . Blood work from today reviewed as well and counts are adequate . She denies any fevers,  chills,  or any new symptoms . \par \par 1/25/21\par Pt is here for follow up.\par C/O diarrhea, otherwise feeling better.\par Has not needed a transfusion since 12/29\par \par 2/16/21\par Pt is here for follow up.\par Needed transfusion on 2/8/21.\par Continues to C/o fatigue. Diarrhea remains the same, has not made GI appt as yet.\par \par 3/8/21\par Pt is here for follow up.\par Feels better today. Recd 1 unit PRBCs last week.\par Diarrhea is same as before. has an appt with GI next week.\par No fever\par \par 3/30/21\par Pt is here for follow up.\par Feels better. Last transfusion was on 3/2/21\par Saw GI and was started on cholestyramine and metformin was DCED with resolution of diarrhea.\par She denies any SOB,\par Fatigue is better\par \par 4/20/2021\par Pt is here to f/u for MDS\par She is s/p Luspatercept cycle #6\par She is compliant with Aspirin\par She feels better today, appetite is good\par She denies shortness of breath, fatigue, or weakness \par She c/o of diarrhea but it has improved since she was started on Cholestyramine. Stool is soft and less in frequency\par She received COVID vaccine x 2 doses\par \par 5/11/21\par pt is here for follow up.\par Feels the same with fatigue, diarrhea.\par No SOB\par \par 6/21/21\par Pt is here for follow up.\par Feels well. No SOB, CP, N< V.\par Diarrhea is better controlled now.\par Last PRBC transfusion was 2 weeks ago.\par \par 7/19/21\par Pt is here for follow up.\par Feels a little tired, last transfusion was 6/1/21.\par No bleeding, fever, SOB\par \par 8/10/2021\par Patient is here to follow up for her MDS.\par She is on Luspatercept, tolerating well.\par She feels well today, the appetite is good.\par She denies shortness of breath, fatigue, fever, night sweats, abdominal pain, arthralgias/myalgias.\par \par 8/31/21\par Pt is here for follow up.\par C/O feeling very fatigued.\par No bleeding.\par No fever.\par \par 9/21/2021\par Patient is here to follow up for MDS.\par She is on Luspatercept and Retacrit, tolerating well.\par She gets blood transfusion as needed for Hgb <7 gm/dL\par She is c/o of fatigue, no shortness of breath, dizziness, chills or lightheadedness.\par She denies melena or hematochezia.\par Her appetite is good, no nausea/vomiting.\par \par 10/28/2021\par Patient is here to follow up for MDS.\par She is on Luspatercept and Retacrit, tolerating well.\par She gets blood transfusion to keep Hgb > 7 gm/dL.\par She is c/o of chronic fatigue, no shortness of breath, dizziness, chills or lightheadedness.\par She denies melena or hematochezia.\par Her appetite is good, no nausea/vomiting.\par She c/o of severe diarrhea, 10-12x/day watery stool while on Imodium in the last 2 weeks.\par Last colonoscopy was in 2016, benign as per patient.\par \par 11/18/21\par Pt is here for follow up. C/O fatigue. No SOB, CP\par \par 12/9/21\par Pt is here for follow up.\par Feels better today. Recd 2 units last week in ER.\par Planning to go to [place] for over a week and does not want to miss her procrit dose.\par Diarrhea is stable,\par \par 12/23/21\par Pt is here for follow up.\par Feels better. No SOB, CP. Going to [place] tomorrow. Has not been able to get Procrit injection from the pharmacy yet d/t insurance issues\par \par 2/3/22\par Pt is here for follow up. Feeling same as usual. No SOB, CP. Last transfusion was 2 weeks ago in ER>\par No bleeding reported\par \par 3/3/22\par Pt is here for follow up.\par C/O fatigue. Had black stools X2\par \par 3/30/22\par Pt is feeling well.\par Here for treatment and BM biopsy\par \par 4/21/22\par Pt is here fir follow up.\par Was in ER last week. Was give 2 units of PRBCs.\par Pt feels less tired today.\par Wants to discuss BM rslts\par \par 5/12/22\par Pt is here today for follow up visit.\par Pt c/o feeling tired.  Hgb=6.9.  One unit transfusion scheduled.\par \par 6/23/22\par Pt is here for follow up. Feels well today. No SOB, chest pain.\par Has not made appt with [facility]\par \par 7/14/22\par Pt is here for follow up for lowrisk myelodysplastic syndrome.\par She came in late today because she was not feeling well this morning- dizziness & weakness.\par She denies SOB, CP.\par \par 9/22/22\par Pt is here for follow up.\par C/O fatigue. was in the ER 3 weeks ago and recd 2 U PRBCS\par \par 11/10/22\par Patient is here to follow up for MDS/MPN.\par S/P 2 units pRBC last weekend, feeling much better now.\par She denies shortness of breath, chest pain or headache.\par \par 1/5/2023\par Patient is here to follow up for MDS/MPN, accompanied by spouse and daughter.\par She is feeling much better after she received blood transfusion on 12/29/22.\par She denies shortness of breath, chest pain, dizziness or headache.\par Pt fell on 12/28/22 when she slipped while getting down the stairs and landed on her right knee. Now c/o of severe right knee pain with limited ROM. She has not been evaluated since the incident happened. She is taking Aleeve with minimal relief.\par \par 1/19/2023\par Patient is here to follow up for MDS/MPN and treatment.\par She feel fine, has fatigue not worse than her baseline.\par She denies shortness of breath, chest pain, dizziness, headache or bleeding.\par She was evaluated at the ER on 1/5/2023 for her fall and imaging showed no fracture, except for mild joint effusion.\par \par 2/9/23\par Patient presents for follow up today. She complains of light-headedness. Her BP is elevated to 180/70 mmHg. She does not take any meds and says she is not diagnosed with HTN. She is due for Procrit & Luspatercept today. Reports her energy level is at baseline. No other complaints today.\par Her CMP showed serum glucose > 600 mg/dl, she was sent to ER with the transport.\par \par 3/2/32\par Patient here for follow up for MDS.\par She is scheduled for next luspatercept injection.\par She is status post port placement yesterday, so she has some discomfort at chest area from that.\par She feels somewhat weak, appetite is adequate, weight is stable.\par She is under the care of endocrinologist to try to get her DM under better control.\par She was recommended to take ASA 81 mg when she was discharged from the hospital.\par \par 3/23/23\par Patient here for follow up for MDS.\par She is scheduled for next Luspatercept injection.\par She is status post port placement\par She feels somewhat weak, appetite is adequate, weight is stable.\par She is under the care of endocrinologist to try to get her DM under better control.\par She was recommended to take ASA 81 mg when she was discharged from the hospital.\par \par 4/13/23\par Pt is here for follow up.\par Daughter is with her. Neuro eval has not been completed yet.\par No bleeding, SOB, pain\par recd 2 Units PRBCs 2 weeks ago [de-identified] : \par

## 2023-04-13 NOTE — PHYSICAL EXAM
[Ambulatory and capable of all self care but unable to carry out any work activities] : Status 2- Ambulatory and capable of all self care but unable to carry out any work activities. Up and about more than 50% of waking hours [Normal] : affect appropriate [de-identified] : In a wheelchair [de-identified] : lower extremity edema, trace on the L, 1-2+on the R [de-identified] : s/p port placement, incisions covered with bandages

## 2023-04-13 NOTE — ASSESSMENT
[FreeTextEntry1] : Patient is a 78 year old female with # Lowrisk myelodysplastic syndrome, previously treated with Revlimid and Procrit and Vidaza. 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75 mg/kg every 3 weeks, due today. \par -- Recheck CBC next week and re-assess for PRBCS.\par Hold procrit for now\par Side effects of Procrit discussed including but not limited to: hypertension, headache, pruritus, nausea, vomiting, injection site pain, arthralgia, cough, fever.\par -- Monitor CBC weekly and administer 1 PRBC unit  as needed when Hgb < 7 gm/dL.  Pt has e/o iron overload so transfusions need to be kept at a minimum if possible.\par \par # Vitamin  B 12 deficiency\par -- Continue Vitamin B 12 injection every month, due today.\par \par # Thrombocytosis (controlled)\par -- previously on Hydrea.\par -- Will continue to monitor.\par \par # Right lower extremity edema\par --negative duplex done in Jan\par --possible Baker's cyst\par --will refer to vascular\par \par Results of  BM biopsy d/w pt. No e/o increased blasts. Pt is needing PRBCs almost every 3 weeks. Advised to follow up with Dr Ferrell to consider any other option or clinical trial. Called Dr Ferrell's office and discussed case with her. No clinical trial available, Rec PRBCS.\par \par \par PLAN D/W pts daughter at length incl prognosis\par RTC in 3 weeks\par \par

## 2023-04-14 ENCOUNTER — APPOINTMENT (OUTPATIENT)
Dept: INFUSION THERAPY | Facility: CLINIC | Age: 78
End: 2023-04-14

## 2023-04-14 LAB
ALBUMIN SERPL ELPH-MCNC: 4.2 G/DL
ALP BLD-CCNC: 123 U/L
ALT SERPL-CCNC: 17 U/L
ANION GAP SERPL CALC-SCNC: 13 MMOL/L
AST SERPL-CCNC: 19 U/L
BILIRUB SERPL-MCNC: 0.6 MG/DL
BUN SERPL-MCNC: 39 MG/DL
CALCIUM SERPL-MCNC: 9.2 MG/DL
CHLORIDE SERPL-SCNC: 101 MMOL/L
CO2 SERPL-SCNC: 20 MMOL/L
CREAT SERPL-MCNC: 1.6 MG/DL
EGFR: 33 ML/MIN/1.73M2
GLUCOSE SERPL-MCNC: 283 MG/DL
POTASSIUM SERPL-SCNC: 5.4 MMOL/L
PROT SERPL-MCNC: 6.1 G/DL
SODIUM SERPL-SCNC: 134 MMOL/L

## 2023-04-17 ENCOUNTER — APPOINTMENT (OUTPATIENT)
Dept: HEMATOLOGY ONCOLOGY | Facility: CLINIC | Age: 78
End: 2023-04-17

## 2023-04-17 ENCOUNTER — LABORATORY RESULT (OUTPATIENT)
Age: 78
End: 2023-04-17

## 2023-04-17 ENCOUNTER — NON-APPOINTMENT (OUTPATIENT)
Age: 78
End: 2023-04-17

## 2023-04-17 ENCOUNTER — OUTPATIENT (OUTPATIENT)
Dept: OUTPATIENT SERVICES | Facility: HOSPITAL | Age: 78
LOS: 1 days | End: 2023-04-17
Payer: MEDICARE

## 2023-04-17 DIAGNOSIS — D46.9 MYELODYSPLASTIC SYNDROME, UNSPECIFIED: ICD-10-CM

## 2023-04-17 DIAGNOSIS — Z51.11 ENCOUNTER FOR ANTINEOPLASTIC CHEMOTHERAPY: ICD-10-CM

## 2023-04-17 DIAGNOSIS — D64.9 ANEMIA, UNSPECIFIED: ICD-10-CM

## 2023-04-17 DIAGNOSIS — Z98.890 OTHER SPECIFIED POSTPROCEDURAL STATES: Chronic | ICD-10-CM

## 2023-04-17 LAB
ALBUMIN SERPL ELPH-MCNC: 4.3 G/DL
ALP BLD-CCNC: 143 U/L
ALT SERPL-CCNC: 19 U/L
ANION GAP SERPL CALC-SCNC: 15 MMOL/L
AST SERPL-CCNC: 17 U/L
BILIRUB SERPL-MCNC: 0.7 MG/DL
BUN SERPL-MCNC: 36 MG/DL
CALCIUM SERPL-MCNC: 9.2 MG/DL
CHLORIDE SERPL-SCNC: 98 MMOL/L
CO2 SERPL-SCNC: 21 MMOL/L
CREAT SERPL-MCNC: 1.5 MG/DL
EGFR: 36 ML/MIN/1.73M2
GLUCOSE SERPL-MCNC: 357 MG/DL
HCT VFR BLD CALC: 23.3 %
HGB BLD-MCNC: 7.4 G/DL
MCHC RBC-ENTMCNC: 29.7 PG
MCHC RBC-ENTMCNC: 31.8 G/DL
MCV RBC AUTO: 93.6 FL
PLATELET # BLD AUTO: 397 K/UL
PMV BLD: 10.2 FL
POTASSIUM SERPL-SCNC: 5 MMOL/L
PROT SERPL-MCNC: 6.4 G/DL
RBC # BLD: 2.49 M/UL
RBC # FLD: 16 %
SODIUM SERPL-SCNC: 134 MMOL/L
WBC # FLD AUTO: 7.1 K/UL

## 2023-04-17 PROCEDURE — 85027 COMPLETE CBC AUTOMATED: CPT

## 2023-04-17 PROCEDURE — 80053 COMPREHEN METABOLIC PANEL: CPT

## 2023-04-17 PROCEDURE — 36415 COLL VENOUS BLD VENIPUNCTURE: CPT

## 2023-04-20 ENCOUNTER — OUTPATIENT (OUTPATIENT)
Dept: OUTPATIENT SERVICES | Facility: HOSPITAL | Age: 78
LOS: 1 days | End: 2023-04-20
Payer: MEDICARE

## 2023-04-20 ENCOUNTER — APPOINTMENT (OUTPATIENT)
Dept: HEMATOLOGY ONCOLOGY | Facility: CLINIC | Age: 78
End: 2023-04-20

## 2023-04-20 ENCOUNTER — NON-APPOINTMENT (OUTPATIENT)
Age: 78
End: 2023-04-20

## 2023-04-20 ENCOUNTER — LABORATORY RESULT (OUTPATIENT)
Age: 78
End: 2023-04-20

## 2023-04-20 DIAGNOSIS — D46.9 MYELODYSPLASTIC SYNDROME, UNSPECIFIED: ICD-10-CM

## 2023-04-20 DIAGNOSIS — Z98.890 OTHER SPECIFIED POSTPROCEDURAL STATES: Chronic | ICD-10-CM

## 2023-04-20 DIAGNOSIS — Z51.11 ENCOUNTER FOR ANTINEOPLASTIC CHEMOTHERAPY: ICD-10-CM

## 2023-04-20 DIAGNOSIS — D64.9 ANEMIA, UNSPECIFIED: ICD-10-CM

## 2023-04-20 LAB
HCT VFR BLD CALC: 23.9 %
HGB BLD-MCNC: 7.6 G/DL
MCHC RBC-ENTMCNC: 29.8 PG
MCHC RBC-ENTMCNC: 31.8 G/DL
MCV RBC AUTO: 93.7 FL
PLATELET # BLD AUTO: 405 K/UL
PMV BLD: 9.9 FL
RBC # BLD: 2.55 M/UL
RBC # FLD: 16.7 %
WBC # FLD AUTO: 7.69 K/UL

## 2023-04-20 PROCEDURE — 85027 COMPLETE CBC AUTOMATED: CPT

## 2023-04-21 ENCOUNTER — APPOINTMENT (OUTPATIENT)
Dept: INFUSION THERAPY | Facility: CLINIC | Age: 78
End: 2023-04-21

## 2023-04-26 ENCOUNTER — NON-APPOINTMENT (OUTPATIENT)
Age: 78
End: 2023-04-26

## 2023-04-27 ENCOUNTER — APPOINTMENT (OUTPATIENT)
Dept: INFUSION THERAPY | Facility: CLINIC | Age: 78
End: 2023-04-27

## 2023-04-27 ENCOUNTER — NON-APPOINTMENT (OUTPATIENT)
Age: 78
End: 2023-04-27

## 2023-04-27 ENCOUNTER — LABORATORY RESULT (OUTPATIENT)
Age: 78
End: 2023-04-27

## 2023-04-27 ENCOUNTER — OUTPATIENT (OUTPATIENT)
Dept: OUTPATIENT SERVICES | Facility: HOSPITAL | Age: 78
LOS: 1 days | End: 2023-04-27
Payer: MEDICARE

## 2023-04-27 DIAGNOSIS — Z51.11 ENCOUNTER FOR ANTINEOPLASTIC CHEMOTHERAPY: ICD-10-CM

## 2023-04-27 DIAGNOSIS — D64.9 ANEMIA, UNSPECIFIED: ICD-10-CM

## 2023-04-27 DIAGNOSIS — Z98.890 OTHER SPECIFIED POSTPROCEDURAL STATES: Chronic | ICD-10-CM

## 2023-04-27 DIAGNOSIS — D46.9 MYELODYSPLASTIC SYNDROME, UNSPECIFIED: ICD-10-CM

## 2023-04-27 PROCEDURE — 86900 BLOOD TYPING SEROLOGIC ABO: CPT

## 2023-04-27 PROCEDURE — 85027 COMPLETE CBC AUTOMATED: CPT

## 2023-04-27 PROCEDURE — 86901 BLOOD TYPING SEROLOGIC RH(D): CPT

## 2023-04-27 PROCEDURE — 36415 COLL VENOUS BLD VENIPUNCTURE: CPT

## 2023-04-27 PROCEDURE — 96372 THER/PROPH/DIAG INJ SC/IM: CPT

## 2023-04-27 PROCEDURE — 86923 COMPATIBILITY TEST ELECTRIC: CPT

## 2023-04-27 PROCEDURE — 86850 RBC ANTIBODY SCREEN: CPT

## 2023-04-27 RX ORDER — PREGABALIN 225 MG/1
1000 CAPSULE ORAL ONCE
Refills: 0 | Status: COMPLETED | OUTPATIENT
Start: 2023-04-27 | End: 2023-04-27

## 2023-04-27 RX ADMIN — PREGABALIN 1000 MICROGRAM(S): 225 CAPSULE ORAL at 11:42

## 2023-04-28 ENCOUNTER — APPOINTMENT (OUTPATIENT)
Dept: INFUSION THERAPY | Facility: CLINIC | Age: 78
End: 2023-04-28

## 2023-04-28 ENCOUNTER — OUTPATIENT (OUTPATIENT)
Dept: OUTPATIENT SERVICES | Facility: HOSPITAL | Age: 78
LOS: 1 days | End: 2023-04-28
Payer: MEDICARE

## 2023-04-28 DIAGNOSIS — Z98.890 OTHER SPECIFIED POSTPROCEDURAL STATES: Chronic | ICD-10-CM

## 2023-04-28 DIAGNOSIS — D46.9 MYELODYSPLASTIC SYNDROME, UNSPECIFIED: ICD-10-CM

## 2023-04-28 DIAGNOSIS — D64.9 ANEMIA, UNSPECIFIED: ICD-10-CM

## 2023-04-28 DIAGNOSIS — Z51.11 ENCOUNTER FOR ANTINEOPLASTIC CHEMOTHERAPY: ICD-10-CM

## 2023-04-28 LAB
ABO + RH PNL BLD: NORMAL
BLD GP AB SCN SERPL QL: NORMAL
HCT VFR BLD CALC: 21.2 %
HGB BLD-MCNC: 6.7 G/DL
MCHC RBC-ENTMCNC: 29.8 PG
MCHC RBC-ENTMCNC: 31.6 G/DL
MCV RBC AUTO: 94.2 FL
PLATELET # BLD AUTO: 385 K/UL
PMV BLD: 10 FL
RBC # BLD: 2.25 M/UL
RBC # FLD: 18.5 %
WBC # FLD AUTO: 6.46 K/UL

## 2023-04-28 PROCEDURE — P9040: CPT

## 2023-04-28 PROCEDURE — 36430 TRANSFUSION BLD/BLD COMPNT: CPT

## 2023-05-04 ENCOUNTER — OUTPATIENT (OUTPATIENT)
Dept: OUTPATIENT SERVICES | Facility: HOSPITAL | Age: 78
LOS: 1 days | End: 2023-05-04
Payer: MEDICARE

## 2023-05-04 ENCOUNTER — NON-APPOINTMENT (OUTPATIENT)
Age: 78
End: 2023-05-04

## 2023-05-04 ENCOUNTER — APPOINTMENT (OUTPATIENT)
Dept: INFUSION THERAPY | Facility: CLINIC | Age: 78
End: 2023-05-04
Payer: MEDICARE

## 2023-05-04 ENCOUNTER — LABORATORY RESULT (OUTPATIENT)
Age: 78
End: 2023-05-04

## 2023-05-04 ENCOUNTER — APPOINTMENT (OUTPATIENT)
Dept: HEMATOLOGY ONCOLOGY | Facility: CLINIC | Age: 78
End: 2023-05-04
Payer: MEDICARE

## 2023-05-04 VITALS
SYSTOLIC BLOOD PRESSURE: 179 MMHG | WEIGHT: 135 LBS | RESPIRATION RATE: 18 BRPM | HEIGHT: 62 IN | TEMPERATURE: 98 F | DIASTOLIC BLOOD PRESSURE: 75 MMHG | BODY MASS INDEX: 24.84 KG/M2 | HEART RATE: 99 BPM

## 2023-05-04 DIAGNOSIS — Z51.11 ENCOUNTER FOR ANTINEOPLASTIC CHEMOTHERAPY: ICD-10-CM

## 2023-05-04 DIAGNOSIS — D64.9 ANEMIA, UNSPECIFIED: ICD-10-CM

## 2023-05-04 DIAGNOSIS — Z98.890 OTHER SPECIFIED POSTPROCEDURAL STATES: Chronic | ICD-10-CM

## 2023-05-04 DIAGNOSIS — D46.9 MYELODYSPLASTIC SYNDROME, UNSPECIFIED: ICD-10-CM

## 2023-05-04 LAB
ANION GAP SERPL CALC-SCNC: 12 MMOL/L
BUN SERPL-MCNC: 52 MG/DL
CALCIUM SERPL-MCNC: 8.9 MG/DL
CHLORIDE SERPL-SCNC: 108 MMOL/L
CO2 SERPL-SCNC: 21 MMOL/L
CREAT SERPL-MCNC: 1.8 MG/DL
EGFR: 29 ML/MIN/1.73M2
GLUCOSE SERPL-MCNC: 380 MG/DL
HCT VFR BLD CALC: 24.4 %
HGB BLD-MCNC: 8 G/DL
MCHC RBC-ENTMCNC: 30.5 PG
MCHC RBC-ENTMCNC: 32.8 G/DL
MCV RBC AUTO: 93.1 FL
PLATELET # BLD AUTO: 246 K/UL
PMV BLD: 11.6 FL
POTASSIUM SERPL-SCNC: 5.8 MMOL/L
RBC # BLD: 2.62 M/UL
RBC # FLD: 16.2 %
SODIUM SERPL-SCNC: 141 MMOL/L
WBC # FLD AUTO: 7.36 K/UL

## 2023-05-04 PROCEDURE — 85027 COMPLETE CBC AUTOMATED: CPT

## 2023-05-04 PROCEDURE — 99214 OFFICE O/P EST MOD 30 MIN: CPT

## 2023-05-04 PROCEDURE — 99214 OFFICE O/P EST MOD 30 MIN: CPT | Mod: 25

## 2023-05-04 PROCEDURE — 96361 HYDRATE IV INFUSION ADD-ON: CPT

## 2023-05-04 PROCEDURE — 96372 THER/PROPH/DIAG INJ SC/IM: CPT

## 2023-05-04 PROCEDURE — 96360 HYDRATION IV INFUSION INIT: CPT

## 2023-05-04 PROCEDURE — 83735 ASSAY OF MAGNESIUM: CPT

## 2023-05-04 PROCEDURE — 36415 COLL VENOUS BLD VENIPUNCTURE: CPT

## 2023-05-04 PROCEDURE — 80048 BASIC METABOLIC PNL TOTAL CA: CPT

## 2023-05-04 RX ORDER — SODIUM ZIRCONIUM CYCLOSILICATE 10 G/10G
5 POWDER, FOR SUSPENSION ORAL ONCE
Refills: 0 | Status: COMPLETED | OUTPATIENT
Start: 2023-05-04 | End: 2023-05-04

## 2023-05-04 RX ORDER — SODIUM CHLORIDE 9 MG/ML
500 INJECTION INTRAMUSCULAR; INTRAVENOUS; SUBCUTANEOUS
Refills: 0 | Status: DISCONTINUED | OUTPATIENT
Start: 2023-05-04 | End: 2023-08-03

## 2023-05-04 RX ORDER — LUSPATERCEPT 75 MG/1
110 INJECTION, POWDER, LYOPHILIZED, FOR SOLUTION SUBCUTANEOUS ONCE
Refills: 0 | Status: COMPLETED | OUTPATIENT
Start: 2023-05-04 | End: 2023-05-04

## 2023-05-04 RX ADMIN — SODIUM CHLORIDE 500 MILLILITER(S): 9 INJECTION INTRAMUSCULAR; INTRAVENOUS; SUBCUTANEOUS at 15:24

## 2023-05-04 RX ADMIN — LUSPATERCEPT 110 MILLIGRAM(S): 75 INJECTION, POWDER, LYOPHILIZED, FOR SOLUTION SUBCUTANEOUS at 11:15

## 2023-05-04 RX ADMIN — SODIUM CHLORIDE 500 MILLILITER(S): 9 INJECTION INTRAMUSCULAR; INTRAVENOUS; SUBCUTANEOUS at 13:15

## 2023-05-04 RX ADMIN — SODIUM ZIRCONIUM CYCLOSILICATE 5 GRAM(S): 10 POWDER, FOR SUSPENSION ORAL at 13:08

## 2023-05-04 NOTE — HISTORY OF PRESENT ILLNESS
[de-identified] : She missed on appointment for procrit last week.\par She starts her next cycle on 6/25/18\par C/o back pain radiating down her left which occurred when she bent to  something.\par No change in diarrhea.\par \par 7/31/18:\par Doing well. On Revlimid for more than 2yrs, 5 mg 2 weeks on 1 week off. She will be starting week on tonight. \par C/o diarrhea. On Imodium 2 pills AM and 2 pills PM. Doesn't help much with diarrhea. \par C/o nausea, abdominal pain. \par \par \par Colonoscopy in 2016 was normal. \par Did not have blood transfusion for over 2 years. \par On procrit 05597cgdot weekly. Missed last week on 7/24/18 as she was out of town. She has been non compliant with weekly Procrit.\par Mammogram in 2017 was normal. \par \par 9/11/18\par pt is here for follow up.\par She feels the lomotil helps with the diarrhea.\par She was away for a few weeks and missed her procrit injections.\par No fever, abdominal  discomfort has been less since since use of lomotil.\par She started a new cycle 2 days ago.\par \par 10/2/18:\par She will start Revlimid tonight (week on). 2 weeks on, 1 week off. \par On ASA 81mg. \par Denies fever, nausea, vomiting, chest pain, SOB, abdominal pain, and bladder problems.\par C/o diarrhea. \par S/p 2 units PRBC transfusion on 9/25/18. \par On Procrit 99040 units weekly. Not compliant every week. \par C/o intermittent dizziness. \par \par 10/31/18\par Pt is here for follow up.\par C/O significant fatigue.\par Continues to have diarrhea, takes imodium and lomotil as needed.\par C/o dry cough, no fever.\par Cough started a month ago. It is worse in the mornings however over last week it has been constant all day.\par No chest pain.\par She was found to have low B12 levels and was started on B12 injections.\par \par 11/27/18:\par Doing well. Hospitalized for 3 days for cellulitis/abcess of the back s/p drainage and on Abx currently. Developed 3 days after Mg infusion as per pt. \par Denies nausea, vomiting, chest pain, SOB, abdominal pain, bowel and bladder problems.\par This is the week on Revlimid (week 1).\par S/p 1 unit PRBC transfusion in the hospital. \par On Procrit weekly \par \par 12/27/18:\par Doing well. No major complaints.\par Denies fever, nausea, vomiting, chest pain, SOB, abdominal pain, and bladder problems.\par On Revlimid 5 mg 2 weeks on 1 week off. This is the week off. She will start the week on sunday (12/30/18).\par Due for Procrit 14107 units today. \par Still has diarrhea. 4-5bm/day.\par On monthly B12 injections. \par \par 1/30/19:\par Patient here for follow up for MDS.  She is taking Revlimid 5 mg, 2 weeks on, 1 week off.  She will complete this current cycle on Saturday.  She has no new complaints today.  Patient denies cough, shortness of breath, denies fever, denies bone pain.\par \par 03/04/2019\par Patient is here for a follow up visit. She complaints of severe pain in her left shoulder and has had abscess drained 2 weeks ago. However the pain is worse and she has purulent discharge on examination. She also has Nasal sores that are painful. Culture from 11/2018 showed MRSA.  Denies Fever. \par She also complaints of swelling in her right leg. Not tender and not erythematous and negative Gong;s sign. \par Her diarrhea with Revlimid is ongoing and Imodium and Lomotil helps but not everyday. \par She is on 60,000 units of procrit weekly and Revlimid 5 mg 2 weeks on 1 week off. \par \par 4/23/19\par Pt is here for follow up.\par She feels well.\par The nasal sores have improved with bactroban\par The abscess on left shoulder has resolved.\par However she has a new one on the middle of her back.\par No fever.\par Pt has been on procrit 03200 units weekly, however she missed a dose in between.\par \par 5/8/19\par pt is here for follow up.\par no new complaints.\par feels well.\par Her skin abscess has resolved.\par she has been unable to go to ID, as she could not get an appt.\par No bleeding.\par She is compliant with revlimid.\par \par 6/6/19\par Pt is here for follow up.\par no new complaints \par Feels well.\par Is on week 2 of her Revlimid cycle. \par No transfusions since last visit\par \par 7/17/19\par Pt is here for follow up.\par C/O fatigue.\par Was away for a few days two weeks ago, but missed treatment with procrit for 2 weeks.\par Is complaint with Revlimid 2 weeks on 1 week off.\par Diarrhea is a little improved.\par \par 8/14/19\par Patient here for follow up visit, feeling well.  Although she is complaining of some pain at the left scapula area recently.  Patient denies cough, shortness of breath, denies fever, denies other bone pain.  She is off Revlimid  this week, due to restart on Monday.  She is scheduled for Procrit and Vitamin B12 injection today.  She plans to leave the country tomorrow to visit her mother who is ill.  She will return in about 10 days.\par \par 9/11/19\par Pt is here for follow up.\par She was away for 3 weeks, out of which she took procrit for 2 weeks in [place].\par She missed last week's dose.\par She had CBCs in [place] which showed Hgb of 8.9\par She feels well.\par She still getting diarrhea.\par She has been using lomotil with very minimal relief.\par She started a new cycle of Revlimid 4 days ago.\par \par 10/3/19\par Patient here for follow up visit, feeling fairly well.   Patient denies cough, shortness of breath, denies fever, denies other bone pain.  She remains on Revlimid.  She is scheduled for Procrit and Vitamin B12 injection today.\par \par 10/24/19\par Pt is here for follow up.\par Feels well.\par No SOB, fatigue.\par Compliant with procrit and Revl;imid.\par Remains on ASa.\par Diarrhea is unchanged.\par Pt takes imodium as needed.\par \par 11/14/19\par Pt is here for follow up.\par No new complaints.\par Starts a new cycle of Revlimid today.\par Diarrhea is same as before, no rectal bleeding.\par No fever.\par \par 12/5/19\par Pt is here for follow up.\par No new complaints.\par Denies feeling weaker than usual.\par No fever, SOB.\par No change in frequency of diarrhea.\par No pain.\par Will start next cycle of Revlimid in 3 days.\par \par \par !/16/20\par Pt was recently DCed from [facility] 3 days ago after being treated for pneumonia and MARCO.\par She is on po Levaquin.\par She restarted Revlmid 3 days ago.\par She is feeling better now. No fever.\par No SOB, Chest pain and back pain has resolved.\par Pt has a cough, no expectoration.\par She also recd a unit of PRBCs last week in the hospital\par \par \par 1/30/20\par Patient here for follow up visit, feeling well.  She has no new complaints. Cough is almost gone completely.  Patient denies shortness of breath, denies fever, denies bone pain.  Her appetite is ok, weight is stable.  She is due to restart Revlimid on Monday.\par \par 02/20/20\par Pt is here for follow up, feeling well.\par Offers no new complaints. Denies SOB, fever, chills, weight loss, CP, cough. \par Currently off week of Revlimid 5 mg. Restarts on Monday.\par Has procrit 80,000 units today. Next b 12 injection due in 2 weeks \par Did not get chest Xray\par CBC reviewed WBC 3.1, h/h 8.3/25%, plt 386, neutrophils 1.29\par \par 3/12/20\par Pt is here for follow up.\par She took Revlimid for 2 weeks and then has been off for one week although she was advised to take it daily without break.\par No SOB, Cough, fever.\par Has mild fatigue.\par \par 04/02/2020\par SIMONA KUHN a 75 year F is here today for follow up visit of MDS.\par Denies fever, chills, cough,night sweats, weight loss. \par Denies bleeding or bruising.\par Pt receives procrit 80,000 units weekly and B12 IM monthly. \par Continued Revlimid 5 mg daily x 3 weeks, has 1 dose left tonight. \par CBC reviewed: WBC 3.2, H/H 8.1/25.2, neutrophils 1.6, PLT 72\par \par 4/20/2020: The patient is here for follow up . She has no complaints of fever, chills, dizziness , chest pain and shortness of breath . She is still with diarrhea , up to 10 watery BMs per day, responds poorly to imodium and lomotil. She is on Revlimid 5 mg daily , took last dose yesterday night. Her last Procrit was on April 13. \par \par 5/26/20\par Pt is here for follow up.\par She is feeling well. Denies SOB, chest pain, fever.\par Continues to have diarrhea although she is off of Revlmid.\par Thinks it may due to diabetic meds.\par Wants to discuss BM bx results\par \par 6/22/20\par Pt is here for follow up.\par Feels well. Denies any fever, N, V, D\par Continues to have diarrhea, she will talk to her PCP about changing metformin for DM.\par Has been needing PRBCs 1 unit each month\par \par 7/20/20\par Pt is here for follow up.\par No new complaints. Has been feeling well.\par No SOB, CP. \par No N, V, D.\par She has recd 2 units of PRBCs since May 20.\par \par 8/17/20\par Pt is here for a follow up visit.\par She is feeling well.\par No new symptoms. No N, V, D.\par No bruising or bleeding. She continues to need PRBCs every 2-3 weeks.\par \par 8/31/20\par Pt is here for follow up. She is feeling well. No N, V, D.\par She is tolerating the hydrea well. No SOB, CP\par No bruising ior bleeding\par \par 9/8/20\par Pt is here for follow up.\par She had been contacted by the [facility] last week to advise her to stop the hydrea. Pt did not check her messages and continued with Hydrea.\par No fever, N, V, bleeding.\par Saw Dr Ferrell last week.\par \par 9/14/20\par Pt is here for folow up.\par Besides her usual complaint of diarrhea she is feeling well.\par Recd 1 unit PRBC last week.\par Has stopped Hydrea.\par No fever, mouth sores.\par \par 9/29/20\par Pt is here for folow up.\par No new complaints.\par Is seeing GI for diarrhea net week.\par No fever, night sweats.\par REcd 3 units of PRBC this month\par \par 10/13/20\par Pt is here for follow up.\par No new complaints.\par Has not needed a transfusion since 3 weeks ago.\par Continues to have diarrhea, waiting to be seen by GI\par \par 10/26/20\par Pt is here for follow up.\par C/O feeling fatigued.\par Has CLARK.\par No nausea or vomiting.\par No fever.\par Diarrhea has improved a little. She is no longer on Metformin\par \par 11/9/20\par Pt is here for follow up.\par Feels well. Denies SOB, CP, fatigue\par \par 12/7/20- Patient is for follow up and is due for cycle 9 of Vidaza.  She still has loose BM sometimes 10 times a day and was seeing Dr. Corey from GI- did not follow up recently and is unable to get an appt with him. She has lost about 10 lbs since September. No N/V. No fever or chills. No CP/SOB. She has been getting PRBC transfusion every 3 weeks now\par \par 01/04/20 Pt presented today for f/u visit . She is s/p 8 cycles of vidaza and was started on Luspatercept in Dec , 2020 . So far she received 1 injection , she is due for 2nd injection today . Adverse effect profile was discussed with her in detail , she reports having some dizziness and weakness after first injection . She received blood transfusion last wk . Blood work from today reviewed as well and counts are adequate . She denies any fevers,  chills,  or any new symptoms . \par \par 1/25/21\par Pt is here for follow up.\par C/O diarrhea, otherwise feeling better.\par Has not needed a transfusion since 12/29\par \par 2/16/21\par Pt is here for follow up.\par Needed transfusion on 2/8/21.\par Continues to C/o fatigue. Diarrhea remains the same, has not made GI appt as yet.\par \par 3/8/21\par Pt is here for follow up.\par Feels better today. Recd 1 unit PRBCs last week.\par Diarrhea is same as before. has an appt with GI next week.\par No fever\par \par 3/30/21\par Pt is here for follow up.\par Feels better. Last transfusion was on 3/2/21\par Saw GI and was started on cholestyramine and metformin was DCED with resolution of diarrhea.\par She denies any SOB,\par Fatigue is better\par \par 4/20/2021\par Pt is here to f/u for MDS\par She is s/p Luspatercept cycle #6\par She is compliant with Aspirin\par She feels better today, appetite is good\par She denies shortness of breath, fatigue, or weakness \par She c/o of diarrhea but it has improved since she was started on Cholestyramine. Stool is soft and less in frequency\par She received COVID vaccine x 2 doses\par \par 5/11/21\par pt is here for follow up.\par Feels the same with fatigue, diarrhea.\par No SOB\par \par 6/21/21\par Pt is here for follow up.\par Feels well. No SOB, CP, N< V.\par Diarrhea is better controlled now.\par Last PRBC transfusion was 2 weeks ago.\par \par 7/19/21\par Pt is here for follow up.\par Feels a little tired, last transfusion was 6/1/21.\par No bleeding, fever, SOB\par \par 8/10/2021\par Patient is here to follow up for her MDS.\par She is on Luspatercept, tolerating well.\par She feels well today, the appetite is good.\par She denies shortness of breath, fatigue, fever, night sweats, abdominal pain, arthralgias/myalgias.\par \par 8/31/21\par Pt is here for follow up.\par C/O feeling very fatigued.\par No bleeding.\par No fever.\par \par 9/21/2021\par Patient is here to follow up for MDS.\par She is on Luspatercept and Retacrit, tolerating well.\par She gets blood transfusion as needed for Hgb <7 gm/dL\par She is c/o of fatigue, no shortness of breath, dizziness, chills or lightheadedness.\par She denies melena or hematochezia.\par Her appetite is good, no nausea/vomiting.\par \par 10/28/2021\par Patient is here to follow up for MDS.\par She is on Luspatercept and Retacrit, tolerating well.\par She gets blood transfusion to keep Hgb > 7 gm/dL.\par She is c/o of chronic fatigue, no shortness of breath, dizziness, chills or lightheadedness.\par She denies melena or hematochezia.\par Her appetite is good, no nausea/vomiting.\par She c/o of severe diarrhea, 10-12x/day watery stool while on Imodium in the last 2 weeks.\par Last colonoscopy was in 2016, benign as per patient.\par \par 11/18/21\par Pt is here for follow up. C/O fatigue. No SOB, CP\par \par 12/9/21\par Pt is here for follow up.\par Feels better today. Recd 2 units last week in ER.\par Planning to go to [place] for over a week and does not want to miss her procrit dose.\par Diarrhea is stable,\par \par 12/23/21\par Pt is here for follow up.\par Feels better. No SOB, CP. Going to [place] tomorrow. Has not been able to get Procrit injection from the pharmacy yet d/t insurance issues\par \par 2/3/22\par Pt is here for follow up. Feeling same as usual. No SOB, CP. Last transfusion was 2 weeks ago in ER>\par No bleeding reported\par \par 3/3/22\par Pt is here for follow up.\par C/O fatigue. Had black stools X2\par \par 3/30/22\par Pt is feeling well.\par Here for treatment and BM biopsy\par \par 4/21/22\par Pt is here fir follow up.\par Was in ER last week. Was give 2 units of PRBCs.\par Pt feels less tired today.\par Wants to discuss BM rslts\par \par 5/12/22\par Pt is here today for follow up visit.\par Pt c/o feeling tired.  Hgb=6.9.  One unit transfusion scheduled.\par \par 6/23/22\par Pt is here for follow up. Feels well today. No SOB, chest pain.\par Has not made appt with [facility]\par \par 7/14/22\par Pt is here for follow up for lowrisk myelodysplastic syndrome.\par She came in late today because she was not feeling well this morning- dizziness & weakness.\par She denies SOB, CP.\par \par 9/22/22\par Pt is here for follow up.\par C/O fatigue. was in the ER 3 weeks ago and recd 2 U PRBCS\par \par 11/10/22\par Patient is here to follow up for MDS/MPN.\par S/P 2 units pRBC last weekend, feeling much better now.\par She denies shortness of breath, chest pain or headache.\par \par 1/5/2023\par Patient is here to follow up for MDS/MPN, accompanied by spouse and daughter.\par She is feeling much better after she received blood transfusion on 12/29/22.\par She denies shortness of breath, chest pain, dizziness or headache.\par Pt fell on 12/28/22 when she slipped while getting down the stairs and landed on her right knee. Now c/o of severe right knee pain with limited ROM. She has not been evaluated since the incident happened. She is taking Aleeve with minimal relief.\par \par 1/19/2023\par Patient is here to follow up for MDS/MPN and treatment.\par She feel fine, has fatigue not worse than her baseline.\par She denies shortness of breath, chest pain, dizziness, headache or bleeding.\par She was evaluated at the ER on 1/5/2023 for her fall and imaging showed no fracture, except for mild joint effusion.\par \par 2/9/23\par Patient presents for follow up today. She complains of light-headedness. Her BP is elevated to 180/70 mmHg. She does not take any meds and says she is not diagnosed with HTN. She is due for Procrit & Luspatercept today. Reports her energy level is at baseline. No other complaints today.\par Her CMP showed serum glucose > 600 mg/dl, she was sent to ER with the transport.\par \par 3/2/32\par Patient here for follow up for MDS.\par She is scheduled for next luspatercept injection.\par She is status post port placement yesterday, so she has some discomfort at chest area from that.\par She feels somewhat weak, appetite is adequate, weight is stable.\par She is under the care of endocrinologist to try to get her DM under better control.\par She was recommended to take ASA 81 mg when she was discharged from the hospital.\par \par 3/23/23\par Patient here for follow up for MDS.\par She is scheduled for next Luspatercept injection.\par She is status post port placement\par She feels somewhat weak, appetite is adequate, weight is stable.\par She is under the care of endocrinologist to try to get her DM under better control.\par She was recommended to take ASA 81 mg when she was discharged from the hospital.\par \par 4/13/23\par Pt is here for follow up.\par Daughter is with her. Neuro eval has not been completed yet.\par No bleeding, SOB, pain\par recd 2 Units PRBCs 2 weeks ago\par \par 5/4/23\par Pt is here for follow up.\par Pt C/O not feeling well today. Denies pain. Has dizziness. Fasting bld sugar was over 350. She is not on insulin.\par Denies diarrhea, N, SOB, CP [de-identified] : \par

## 2023-05-04 NOTE — ASSESSMENT
[FreeTextEntry1] : Patient is a 78 year old female with # Lowrisk myelodysplastic syndrome, previously treated with Revlimid and Procrit and Vidaza. 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75 mg/kg every 3 weeks, due today.  Her last PRBCS transfusion was last week.\par Her requirement for PRBCs is stable about Q 3-4 weeks\par -- Recheck CBC next week and re-assess for PRBCS.\par Hold procrit for now\par Side effects of Procrit discussed including but not limited to: hypertension, headache, pruritus, nausea, vomiting, injection site pain, arthralgia, cough, fever.\par -- Monitor CBC weekly and administer 1 PRBC unit  as needed when Hgb < 7 gm/dL.  Pt has e/o iron overload so transfusions need to be kept at a minimum if possible.\par \par # Vitamin  B 12 deficiency\par -- Continue Vitamin B 12 injection every month, due today.\par \par # Thrombocytosis (controlled)\par -- previously on Hydrea.\par -- Will continue to monitor.\par \par # Right lower extremity edema\par --negative duplex done in Jan\par --possible Baker's cyst\par --will d/w PCP, consider cardio eval\par \par # high bld sugar and dizziness\par Check BMP, Mg Stat, pt will wait for results\par \par Results of  BM biopsy d/w pt. No e/o increased blasts. Pt is needing PRBCs almost every 3 weeks. Advised to follow up with Dr Ferrell to consider any other option or clinical trial. Called Dr Ferrell's office and discussed case with her. No clinical trial available, Rec PRBCS.\par \par \par \par RTC in 3 weeks\par \par

## 2023-05-04 NOTE — PHYSICAL EXAM
[Normal] : affect appropriate [Capable of only limited self care, confined to bed or chair more than 50% of waking hours] : Status 3- Capable of only limited self care, confined to bed or chair more than 50% of waking hours [de-identified] : In a wheelchair, looks unwell [de-identified] : lower extremity edema, trace on the L, 1-2+on the R [de-identified] : s/p port placement, incisions covered with bandages

## 2023-05-05 ENCOUNTER — APPOINTMENT (OUTPATIENT)
Dept: INFUSION THERAPY | Facility: CLINIC | Age: 78
End: 2023-05-05

## 2023-05-05 ENCOUNTER — OUTPATIENT (OUTPATIENT)
Dept: OUTPATIENT SERVICES | Facility: HOSPITAL | Age: 78
LOS: 1 days | End: 2023-05-05
Payer: MEDICARE

## 2023-05-05 VITALS
SYSTOLIC BLOOD PRESSURE: 159 MMHG | DIASTOLIC BLOOD PRESSURE: 75 MMHG | RESPIRATION RATE: 16 BRPM | HEART RATE: 78 BPM | TEMPERATURE: 97.4 F

## 2023-05-05 DIAGNOSIS — D46.9 MYELODYSPLASTIC SYNDROME, UNSPECIFIED: ICD-10-CM

## 2023-05-05 DIAGNOSIS — D64.9 ANEMIA, UNSPECIFIED: ICD-10-CM

## 2023-05-05 DIAGNOSIS — Z51.11 ENCOUNTER FOR ANTINEOPLASTIC CHEMOTHERAPY: ICD-10-CM

## 2023-05-05 DIAGNOSIS — Z98.890 OTHER SPECIFIED POSTPROCEDURAL STATES: Chronic | ICD-10-CM

## 2023-05-05 LAB — MAGNESIUM SERPL-MCNC: 1.6 MG/DL

## 2023-05-05 PROCEDURE — 36415 COLL VENOUS BLD VENIPUNCTURE: CPT

## 2023-05-05 PROCEDURE — 80053 COMPREHEN METABOLIC PANEL: CPT

## 2023-05-05 PROCEDURE — 96360 HYDRATION IV INFUSION INIT: CPT

## 2023-05-05 PROCEDURE — 96361 HYDRATE IV INFUSION ADD-ON: CPT

## 2023-05-05 RX ORDER — SODIUM CHLORIDE 9 MG/ML
500 INJECTION INTRAMUSCULAR; INTRAVENOUS; SUBCUTANEOUS
Refills: 0 | Status: COMPLETED | OUTPATIENT
Start: 2023-05-05 | End: 2023-05-05

## 2023-05-05 RX ADMIN — SODIUM CHLORIDE 500 MILLILITER(S): 9 INJECTION INTRAMUSCULAR; INTRAVENOUS; SUBCUTANEOUS at 14:00

## 2023-05-05 RX ADMIN — SODIUM CHLORIDE 250 MILLILITER(S): 9 INJECTION INTRAMUSCULAR; INTRAVENOUS; SUBCUTANEOUS at 14:00

## 2023-05-08 ENCOUNTER — NON-APPOINTMENT (OUTPATIENT)
Age: 78
End: 2023-05-08

## 2023-05-11 ENCOUNTER — OUTPATIENT (OUTPATIENT)
Dept: OUTPATIENT SERVICES | Facility: HOSPITAL | Age: 78
LOS: 1 days | End: 2023-05-11
Payer: MEDICARE

## 2023-05-11 ENCOUNTER — APPOINTMENT (OUTPATIENT)
Dept: HEMATOLOGY ONCOLOGY | Facility: CLINIC | Age: 78
End: 2023-05-11
Payer: MEDICARE

## 2023-05-11 ENCOUNTER — LABORATORY RESULT (OUTPATIENT)
Age: 78
End: 2023-05-11

## 2023-05-11 DIAGNOSIS — Z51.11 ENCOUNTER FOR ANTINEOPLASTIC CHEMOTHERAPY: ICD-10-CM

## 2023-05-11 DIAGNOSIS — D46.9 MYELODYSPLASTIC SYNDROME, UNSPECIFIED: ICD-10-CM

## 2023-05-11 DIAGNOSIS — D64.9 ANEMIA, UNSPECIFIED: ICD-10-CM

## 2023-05-11 DIAGNOSIS — E53.8 DEFICIENCY OF OTHER SPECIFIED B GROUP VITAMINS: ICD-10-CM

## 2023-05-11 DIAGNOSIS — Z98.890 OTHER SPECIFIED POSTPROCEDURAL STATES: Chronic | ICD-10-CM

## 2023-05-11 PROCEDURE — 99212 OFFICE O/P EST SF 10 MIN: CPT

## 2023-05-11 PROCEDURE — 85027 COMPLETE CBC AUTOMATED: CPT

## 2023-05-11 PROCEDURE — 86900 BLOOD TYPING SEROLOGIC ABO: CPT

## 2023-05-11 PROCEDURE — 86850 RBC ANTIBODY SCREEN: CPT

## 2023-05-11 PROCEDURE — 36415 COLL VENOUS BLD VENIPUNCTURE: CPT

## 2023-05-11 PROCEDURE — 86923 COMPATIBILITY TEST ELECTRIC: CPT

## 2023-05-11 PROCEDURE — 86901 BLOOD TYPING SEROLOGIC RH(D): CPT

## 2023-05-12 ENCOUNTER — APPOINTMENT (OUTPATIENT)
Dept: INFUSION THERAPY | Facility: CLINIC | Age: 78
End: 2023-05-12

## 2023-05-12 ENCOUNTER — OUTPATIENT (OUTPATIENT)
Dept: OUTPATIENT SERVICES | Facility: HOSPITAL | Age: 78
LOS: 1 days | End: 2023-05-12
Payer: MEDICARE

## 2023-05-12 DIAGNOSIS — Z98.890 OTHER SPECIFIED POSTPROCEDURAL STATES: Chronic | ICD-10-CM

## 2023-05-12 DIAGNOSIS — D64.9 ANEMIA, UNSPECIFIED: ICD-10-CM

## 2023-05-12 DIAGNOSIS — D46.9 MYELODYSPLASTIC SYNDROME, UNSPECIFIED: ICD-10-CM

## 2023-05-12 DIAGNOSIS — Z51.11 ENCOUNTER FOR ANTINEOPLASTIC CHEMOTHERAPY: ICD-10-CM

## 2023-05-12 PROCEDURE — P9040: CPT

## 2023-05-12 PROCEDURE — 36430 TRANSFUSION BLD/BLD COMPNT: CPT

## 2023-05-15 NOTE — ED PROCEDURE NOTE - NS ED PERI NEURO NEG
Chart reviewed. Pt last seen for fertility consult on 12/14/22, per NP notes, \" We discussed Femara medication and all options including IUI and IVF. She is wanting to try orals and will call in April when she can start.\" Patient is currently using letrozole (Femara) 2.5 MG tablet- 5mg on CD 5-9. Call to pt. Left message for pt to call back.     Post-application: Motor, sensory, and vascular responses intact in the injured extremity./The patient/caregiver verbalized understanding of how to care for the injured extremity with splint

## 2023-05-16 ENCOUNTER — NON-APPOINTMENT (OUTPATIENT)
Age: 78
End: 2023-05-16

## 2023-05-16 LAB
ABO + RH PNL BLD: NORMAL
ALBUMIN SERPL ELPH-MCNC: 4 G/DL
ALP BLD-CCNC: 108 U/L
ALT SERPL-CCNC: 17 U/L
ANION GAP SERPL CALC-SCNC: 11 MMOL/L
AST SERPL-CCNC: 14 U/L
BILIRUB SERPL-MCNC: 0.6 MG/DL
BLD GP AB SCN SERPL QL: NORMAL
BUN SERPL-MCNC: 47 MG/DL
CALCIUM SERPL-MCNC: 9 MG/DL
CHLORIDE SERPL-SCNC: 108 MMOL/L
CO2 SERPL-SCNC: 19 MMOL/L
CREAT SERPL-MCNC: 1.5 MG/DL
EGFR: 36 ML/MIN/1.73M2
GLUCOSE SERPL-MCNC: 305 MG/DL
HCT VFR BLD CALC: 22 %
HGB BLD-MCNC: 7 G/DL
MCHC RBC-ENTMCNC: 29.8 PG
MCHC RBC-ENTMCNC: 31.8 G/DL
MCV RBC AUTO: 93.6 FL
PLATELET # BLD AUTO: 413 K/UL
PMV BLD: 9.6 FL
POTASSIUM SERPL-SCNC: 5.4 MMOL/L
PROT SERPL-MCNC: 5.8 G/DL
RBC # BLD: 2.35 M/UL
RBC # FLD: 16.8 %
SODIUM SERPL-SCNC: 138 MMOL/L
WBC # FLD AUTO: 7.5 K/UL

## 2023-05-18 ENCOUNTER — OUTPATIENT (OUTPATIENT)
Dept: OUTPATIENT SERVICES | Facility: HOSPITAL | Age: 78
LOS: 1 days | End: 2023-05-18
Payer: MEDICARE

## 2023-05-18 ENCOUNTER — LABORATORY RESULT (OUTPATIENT)
Age: 78
End: 2023-05-18

## 2023-05-18 ENCOUNTER — APPOINTMENT (OUTPATIENT)
Dept: HEMATOLOGY ONCOLOGY | Facility: CLINIC | Age: 78
End: 2023-05-18

## 2023-05-18 DIAGNOSIS — D46.9 MYELODYSPLASTIC SYNDROME, UNSPECIFIED: ICD-10-CM

## 2023-05-18 DIAGNOSIS — Z51.11 ENCOUNTER FOR ANTINEOPLASTIC CHEMOTHERAPY: ICD-10-CM

## 2023-05-18 DIAGNOSIS — D64.9 ANEMIA, UNSPECIFIED: ICD-10-CM

## 2023-05-18 LAB
HCT VFR BLD CALC: 24.4 %
HGB BLD-MCNC: 8.1 G/DL
MCHC RBC-ENTMCNC: 30.2 PG
MCHC RBC-ENTMCNC: 33.2 G/DL
MCV RBC AUTO: 91 FL
PLATELET # BLD AUTO: 329 K/UL
PMV BLD: 10.2 FL
RBC # BLD: 2.68 M/UL
RBC # FLD: 16.7 %
WBC # FLD AUTO: 5.79 K/UL

## 2023-05-18 PROCEDURE — 36416 COLLJ CAPILLARY BLOOD SPEC: CPT

## 2023-05-18 PROCEDURE — 85027 COMPLETE CBC AUTOMATED: CPT

## 2023-05-19 ENCOUNTER — APPOINTMENT (OUTPATIENT)
Dept: INFUSION THERAPY | Facility: CLINIC | Age: 78
End: 2023-05-19

## 2023-05-24 ENCOUNTER — NON-APPOINTMENT (OUTPATIENT)
Age: 78
End: 2023-05-24

## 2023-05-25 ENCOUNTER — OUTPATIENT (OUTPATIENT)
Dept: OUTPATIENT SERVICES | Facility: HOSPITAL | Age: 78
LOS: 1 days | End: 2023-05-25
Payer: MEDICARE

## 2023-05-25 ENCOUNTER — APPOINTMENT (OUTPATIENT)
Dept: HEMATOLOGY ONCOLOGY | Facility: CLINIC | Age: 78
End: 2023-05-25
Payer: MEDICARE

## 2023-05-25 ENCOUNTER — LABORATORY RESULT (OUTPATIENT)
Age: 78
End: 2023-05-25

## 2023-05-25 ENCOUNTER — APPOINTMENT (OUTPATIENT)
Dept: INFUSION THERAPY | Facility: CLINIC | Age: 78
End: 2023-05-25
Payer: MEDICARE

## 2023-05-25 VITALS
TEMPERATURE: 97.8 F | HEIGHT: 62 IN | WEIGHT: 132 LBS | SYSTOLIC BLOOD PRESSURE: 159 MMHG | BODY MASS INDEX: 24.29 KG/M2 | HEART RATE: 106 BPM | DIASTOLIC BLOOD PRESSURE: 70 MMHG | RESPIRATION RATE: 16 BRPM

## 2023-05-25 DIAGNOSIS — D46.9 MYELODYSPLASTIC SYNDROME, UNSPECIFIED: ICD-10-CM

## 2023-05-25 DIAGNOSIS — D64.9 ANEMIA, UNSPECIFIED: ICD-10-CM

## 2023-05-25 DIAGNOSIS — Z98.890 OTHER SPECIFIED POSTPROCEDURAL STATES: Chronic | ICD-10-CM

## 2023-05-25 DIAGNOSIS — Z51.11 ENCOUNTER FOR ANTINEOPLASTIC CHEMOTHERAPY: ICD-10-CM

## 2023-05-25 PROCEDURE — 99214 OFFICE O/P EST MOD 30 MIN: CPT

## 2023-05-25 PROCEDURE — 96372 THER/PROPH/DIAG INJ SC/IM: CPT

## 2023-05-25 PROCEDURE — 36415 COLL VENOUS BLD VENIPUNCTURE: CPT

## 2023-05-25 PROCEDURE — 80053 COMPREHEN METABOLIC PANEL: CPT

## 2023-05-25 PROCEDURE — 85027 COMPLETE CBC AUTOMATED: CPT

## 2023-05-25 PROCEDURE — 99214 OFFICE O/P EST MOD 30 MIN: CPT | Mod: 25

## 2023-05-25 RX ORDER — PREGABALIN 225 MG/1
1000 CAPSULE ORAL ONCE
Refills: 0 | Status: COMPLETED | OUTPATIENT
Start: 2023-05-25 | End: 2023-05-25

## 2023-05-25 RX ORDER — LUSPATERCEPT 75 MG/1
110 INJECTION, POWDER, LYOPHILIZED, FOR SOLUTION SUBCUTANEOUS ONCE
Refills: 0 | Status: COMPLETED | OUTPATIENT
Start: 2023-05-25 | End: 2023-05-25

## 2023-05-25 RX ADMIN — LUSPATERCEPT 110 MILLIGRAM(S): 75 INJECTION, POWDER, LYOPHILIZED, FOR SOLUTION SUBCUTANEOUS at 12:31

## 2023-05-25 RX ADMIN — PREGABALIN 1000 MICROGRAM(S): 225 CAPSULE ORAL at 12:27

## 2023-05-25 NOTE — PHYSICAL EXAM
[Capable of only limited self care, confined to bed or chair more than 50% of waking hours] : Status 3- Capable of only limited self care, confined to bed or chair more than 50% of waking hours [Normal] : normal appearance, no rash, nodules, vesicles, ulcers, erythema [de-identified] : Ambulatory. [de-identified] : Lower extremity edema - LLE +1, RLE +2-3 [de-identified] : Right chest port-A-cath intact, no erythema or tenderness noted.

## 2023-05-25 NOTE — REVIEW OF SYSTEMS
[Fatigue] : fatigue [Negative] : Allergic/Immunologic [Recent Change In Weight] : ~T no recent weight change [Shortness Of Breath] : no shortness of breath [Dizziness] : no dizziness [FreeTextEntry2] : Unchanged fatigue.

## 2023-05-25 NOTE — HISTORY OF PRESENT ILLNESS
[de-identified] : She missed on appointment for procrit last week.\par She starts her next cycle on 6/25/18\par C/o back pain radiating down her left which occurred when she bent to  something.\par No change in diarrhea.\par \par 7/31/18:\par Doing well. On Revlimid for more than 2yrs, 5 mg 2 weeks on 1 week off. She will be starting week on tonight. \par C/o diarrhea. On Imodium 2 pills AM and 2 pills PM. Doesn't help much with diarrhea. \par C/o nausea, abdominal pain. \par \par \par Colonoscopy in 2016 was normal. \par Did not have blood transfusion for over 2 years. \par On procrit [id number] weekly. Missed last week on 7/24/18 as she was out of town. She has been non compliant with weekly Procrit.\par Mammogram in 2017 was normal. \par \par 9/11/18\par pt is here for follow up.\par She feels the lomotil helps with the diarrhea.\par She was away for a few weeks and missed her procrit injections.\par No fever, abdominal  discomfort has been less since since use of lomotil.\par She started a new cycle 2 days ago.\par \par 10/2/18:\par She will start Revlimid tonight (week on). 2 weeks on, 1 week off. \par On ASA 81mg. \par Denies fever, nausea, vomiting, chest pain, SOB, abdominal pain, and bladder problems.\par C/o diarrhea. \par S/p 2 units PRBC transfusion on 9/25/18. \par On Procrit 97015 units weekly. Not compliant every week. \par C/o intermittent dizziness. \par \par 10/31/18\par Pt is here for follow up.\par C/O significant fatigue.\par Continues to have diarrhea, takes imodium and lomotil as needed.\par C/o dry cough, no fever.\par Cough started a month ago. It is worse in the mornings however over last week it has been constant all day.\par No chest pain.\par She was found to have low B12 levels and was started on B12 injections.\par \par 11/27/18:\par Doing well. Hospitalized for 3 days for cellulitis/abcess of the back s/p drainage and on Abx currently. Developed 3 days after Mg infusion as per pt. \par Denies nausea, vomiting, chest pain, SOB, abdominal pain, bowel and bladder problems.\par This is the week on Revlimid (week 1).\par S/p 1 unit PRBC transfusion in the hospital. \par On Procrit weekly \par \par 12/27/18:\par Doing well. No major complaints.\par Denies fever, nausea, vomiting, chest pain, SOB, abdominal pain, and bladder problems.\par On Revlimid 5 mg 2 weeks on 1 week off. This is the week off. She will start the week on sunday (12/30/18).\par Due for Procrit 94332 units today. \par Still has diarrhea. 4-5bm/day.\par On monthly B12 injections. \par \par 1/30/19:\par Patient here for follow up for MDS.  She is taking Revlimid 5 mg, 2 weeks on, 1 week off.  She will complete this current cycle on Saturday.  She has no new complaints today.  Patient denies cough, shortness of breath, denies fever, denies bone pain.\par \par 03/04/2019\par Patient is here for a follow up visit. She complaints of severe pain in her left shoulder and has had abscess drained 2 weeks ago. However the pain is worse and she has purulent discharge on examination. She also has Nasal sores that are painful. Culture from 11/2018 showed MRSA.  Denies Fever. \par She also complaints of swelling in her right leg. Not tender and not erythematous and negative Gong;s sign. \par Her diarrhea with Revlimid is ongoing and Imodium and Lomotil helps but not everyday. \par She is on 60,000 units of procrit weekly and Revlimid 5 mg 2 weeks on 1 week off. \par \par 4/23/19\par Pt is here for follow up.\par She feels well.\par The nasal sores have improved with bactroban\par The abscess on left shoulder has resolved.\par However she has a new one on the middle of her back.\par No fever.\par Pt has been on procrit 14571 units weekly, however she missed a dose in between.\par \par 5/8/19\par pt is here for follow up.\par no new complaints.\par feels well.\par Her skin abscess has resolved.\par she has been unable to go to ID, as she could not get an appt.\par No bleeding.\par She is compliant with revlimid.\par \par 6/6/19\par Pt is here for follow up.\par no new complaints \par Feels well.\par Is on week 2 of her Revlimid cycle. \par No transfusions since last visit\par \par 7/17/19\par Pt is here for follow up.\par C/O fatigue.\par Was away for a few days two weeks ago, but missed treatment with procrit for 2 weeks.\par Is complaint with Revlimid 2 weeks on 1 week off.\par Diarrhea is a little improved.\par \par 8/14/19\par Patient here for follow up visit, feeling well.  Although she is complaining of some pain at the left scapula area recently.  Patient denies cough, shortness of breath, denies fever, denies other bone pain.  She is off Revlimid  this week, due to restart on Monday.  She is scheduled for Procrit and Vitamin B12 injection today.  She plans to leave the country tomorrow to visit her mother who is ill.  She will return in about 10 days.\par \par 9/11/19\par Pt is here for follow up.\par She was away for 3 weeks, out of which she took procrit for 2 weeks in [place].\par She missed last week's dose.\par She had CBCs in [place] which showed Hgb of 8.9\par She feels well.\par She still getting diarrhea.\par She has been using lomotil with very minimal relief.\par She started a new cycle of Revlimid 4 days ago.\par \par 10/3/19\par Patient here for follow up visit, feeling fairly well.   Patient denies cough, shortness of breath, denies fever, denies other bone pain.  She remains on Revlimid.  She is scheduled for Procrit and Vitamin B12 injection today.\par \par 10/24/19\par Pt is here for follow up.\par Feels well.\par No SOB, fatigue.\par Compliant with procrit and Revl;imid.\par Remains on ASa.\par Diarrhea is unchanged.\par Pt takes imodium as needed.\par \par 11/14/19\par Pt is here for follow up.\par No new complaints.\par Starts a new cycle of Revlimid today.\par Diarrhea is same as before, no rectal bleeding.\par No fever.\par \par 12/5/19\par Pt is here for follow up.\par No new complaints.\par Denies feeling weaker than usual.\par No fever, SOB.\par No change in frequency of diarrhea.\par No pain.\par Will start next cycle of Revlimid in 3 days.\par \par \par !/16/20\par Pt was recently DCed from [facility] 3 days ago after being treated for pneumonia and MARCO.\par She is on po Levaquin.\par She restarted Revlmid 3 days ago.\par She is feeling better now. No fever.\par No SOB, Chest pain and back pain has resolved.\par Pt has a cough, no expectoration.\par She also recd a unit of PRBCs last week in the hospital\par \par \par 1/30/20\par Patient here for follow up visit, feeling well.  She has no new complaints. Cough is almost gone completely.  Patient denies shortness of breath, denies fever, denies bone pain.  Her appetite is ok, weight is stable.  She is due to restart Revlimid on Monday.\par \par 02/20/20\par Pt is here for follow up, feeling well.\par Offers no new complaints. Denies SOB, fever, chills, weight loss, CP, cough. \par Currently off week of Revlimid 5 mg. Restarts on Monday.\par Has procrit 80,000 units today. Next b 12 injection due in 2 weeks \par Did not get chest Xray\par CBC reviewed WBC 3.1, h/h 8.3/25%, plt 386, neutrophils 1.29\par \par 3/12/20\par Pt is here for follow up.\par She took Revlimid for 2 weeks and then has been off for one week although she was advised to take it daily without break.\par No SOB, Cough, fever.\par Has mild fatigue.\par \par 04/02/2020\par SIMONA KUHN a 75 year F is here today for follow up visit of MDS.\par Denies fever, chills, cough,night sweats, weight loss. \par Denies bleeding or bruising.\par Pt receives procrit 80,000 units weekly and B12 IM monthly. \par Continued Revlimid 5 mg daily x 3 weeks, has 1 dose left tonight. \par CBC reviewed: WBC 3.2, H/H 8.1/25.2, neutrophils 1.6, PLT 72\par \par 4/20/2020: The patient is here for follow up . She has no complaints of fever, chills, dizziness , chest pain and shortness of breath . She is still with diarrhea , up to 10 watery BMs per day, responds poorly to imodium and lomotil. She is on Revlimid 5 mg daily , took last dose yesterday night. Her last Procrit was on April 13. \par \par 5/26/20\par Pt is here for follow up.\par She is feeling well. Denies SOB, chest pain, fever.\par Continues to have diarrhea although she is off of Revlmid.\par Thinks it may due to diabetic meds.\par Wants to discuss BM bx results\par \par 6/22/20\par Pt is here for follow up.\par Feels well. Denies any fever, N, V, D\par Continues to have diarrhea, she will talk to her PCP about changing metformin for DM.\par Has been needing PRBCs 1 unit each month\par \par 7/20/20\par Pt is here for follow up.\par No new complaints. Has been feeling well.\par No SOB, CP. \par No N, V, D.\par She has recd 2 units of PRBCs since May 20.\par \par 8/17/20\par Pt is here for a follow up visit.\par She is feeling well.\par No new symptoms. No N, V, D.\par No bruising or bleeding. She continues to need PRBCs every 2-3 weeks.\par \par 8/31/20\par Pt is here for follow up. She is feeling well. No N, V, D.\par She is tolerating the hydrea well. No SOB, CP\par No bruising ior bleeding\par \par 9/8/20\par Pt is here for follow up.\par She had been contacted by the [facility] last week to advise her to stop the hydrea. Pt did not check her messages and continued with Hydrea.\par No fever, N, V, bleeding.\par Saw Dr Ferrell last week.\par \par 9/14/20\par Pt is here for folow up.\par Besides her usual complaint of diarrhea she is feeling well.\par Recd 1 unit PRBC last week.\par Has stopped Hydrea.\par No fever, mouth sores.\par \par 9/29/20\par Pt is here for folow up.\par No new complaints.\par Is seeing GI for diarrhea net week.\par No fever, night sweats.\par REcd 3 units of PRBC this month\par \par 10/13/20\par Pt is here for follow up.\par No new complaints.\par Has not needed a transfusion since 3 weeks ago.\par Continues to have diarrhea, waiting to be seen by GI\par \par 10/26/20\par Pt is here for follow up.\par C/O feeling fatigued.\par Has CLARK.\par No nausea or vomiting.\par No fever.\par Diarrhea has improved a little. She is no longer on Metformin\par \par 11/9/20\par Pt is here for follow up.\par Feels well. Denies SOB, CP, fatigue\par \par 12/7/20- Patient is for follow up and is due for cycle 9 of Vidaza.  She still has loose BM sometimes 10 times a day and was seeing Dr. Corey from GI- did not follow up recently and is unable to get an appt with him. She has lost about 10 lbs since September. No N/V. No fever or chills. No CP/SOB. She has been getting PRBC transfusion every 3 weeks now\par \par 01/04/20 Pt presented today for f/u visit . She is s/p 8 cycles of vidaza and was started on Luspatercept in Dec , 2020 . So far she received 1 injection , she is due for 2nd injection today . Adverse effect profile was discussed with her in detail , she reports having some dizziness and weakness after first injection . She received blood transfusion last wk . Blood work from today reviewed as well and counts are adequate . She denies any fevers,  chills,  or any new symptoms . \par \par 1/25/21\par Pt is here for follow up.\par C/O diarrhea, otherwise feeling better.\par Has not needed a transfusion since 12/29\par \par 2/16/21\par Pt is here for follow up.\par Needed transfusion on 2/8/21.\par Continues to C/o fatigue. Diarrhea remains the same, has not made GI appt as yet.\par \par 3/8/21\par Pt is here for follow up.\par Feels better today. Recd 1 unit PRBCs last week.\par Diarrhea is same as before. has an appt with GI next week.\par No fever\par \par 3/30/21\par Pt is here for follow up.\par Feels better. Last transfusion was on 3/2/21\par Saw GI and was started on cholestyramine and metformin was DCED with resolution of diarrhea.\par She denies any SOB,\par Fatigue is better\par \par 4/20/2021\par Pt is here to f/u for MDS\par She is s/p Luspatercept cycle #6\par She is compliant with Aspirin\par She feels better today, appetite is good\par She denies shortness of breath, fatigue, or weakness \par She c/o of diarrhea but it has improved since she was started on Cholestyramine. Stool is soft and less in frequency\par She received COVID vaccine x 2 doses\par \par 5/11/21\par pt is here for follow up.\par Feels the same with fatigue, diarrhea.\par No SOB\par \par 6/21/21\par Pt is here for follow up.\par Feels well. No SOB, CP, N< V.\par Diarrhea is better controlled now.\par Last PRBC transfusion was 2 weeks ago.\par \par 7/19/21\par Pt is here for follow up.\par Feels a little tired, last transfusion was 6/1/21.\par No bleeding, fever, SOB\par \par 8/10/2021\par Patient is here to follow up for her MDS.\par She is on Luspatercept, tolerating well.\par She feels well today, the appetite is good.\par She denies shortness of breath, fatigue, fever, night sweats, abdominal pain, arthralgias/myalgias.\par \par 8/31/21\par Pt is here for follow up.\par C/O feeling very fatigued.\par No bleeding.\par No fever.\par \par 9/21/2021\par Patient is here to follow up for MDS.\par She is on Luspatercept and Retacrit, tolerating well.\par She gets blood transfusion as needed for Hgb <7 gm/dL\par She is c/o of fatigue, no shortness of breath, dizziness, chills or lightheadedness.\par She denies melena or hematochezia.\par Her appetite is good, no nausea/vomiting.\par \par 10/28/2021\par Patient is here to follow up for MDS.\par She is on Luspatercept and Retacrit, tolerating well.\par She gets blood transfusion to keep Hgb > 7 gm/dL.\par She is c/o of chronic fatigue, no shortness of breath, dizziness, chills or lightheadedness.\par She denies melena or hematochezia.\par Her appetite is good, no nausea/vomiting.\par She c/o of severe diarrhea, 10-12x/day watery stool while on Imodium in the last 2 weeks.\par Last colonoscopy was in 2016, benign as per patient.\par \par 11/18/21\par Pt is here for follow up. C/O fatigue. No SOB, CP\par \par 12/9/21\par Pt is here for follow up.\par Feels better today. Recd 2 units last week in ER.\par Planning to go to [place] for over a week and does not want to miss her procrit dose.\par Diarrhea is stable,\par \par 12/23/21\par Pt is here for follow up.\par Feels better. No SOB, CP. Going to [place] tomorrow. Has not been able to get Procrit injection from the pharmacy yet d/t insurance issues\par \par 2/3/22\par Pt is here for follow up. Feeling same as usual. No SOB, CP. Last transfusion was 2 weeks ago in ER>\par No bleeding reported\par \par 3/3/22\par Pt is here for follow up.\par C/O fatigue. Had black stools X2\par \par 3/30/22\par Pt is feeling well.\par Here for treatment and BM biopsy\par \par 4/21/22\par Pt is here fir follow up.\par Was in ER last week. Was give 2 units of PRBCs.\par Pt feels less tired today.\par Wants to discuss BM rslts\par \par 5/12/22\par Pt is here today for follow up visit.\par Pt c/o feeling tired.  Hgb=6.9.  One unit transfusion scheduled.\par \par 6/23/22\par Pt is here for follow up. Feels well today. No SOB, chest pain.\par Has not made appt with [facility]\par \par 7/14/22\par Pt is here for follow up for lowrisk myelodysplastic syndrome.\par She came in late today because she was not feeling well this morning- dizziness & weakness.\par She denies SOB, CP.\par \par 9/22/22\par Pt is here for follow up.\par C/O fatigue. was in the ER 3 weeks ago and recd 2 U PRBCS\par \par 11/10/22\par Patient is here to follow up for MDS/MPN.\par S/P 2 units pRBC last weekend, feeling much better now.\par She denies shortness of breath, chest pain or headache.\par \par 1/5/2023\par Patient is here to follow up for MDS/MPN, accompanied by spouse and daughter.\par She is feeling much better after she received blood transfusion on 12/29/22.\par She denies shortness of breath, chest pain, dizziness or headache.\par Pt fell on 12/28/22 when she slipped while getting down the stairs and landed on her right knee. Now c/o of severe right knee pain with limited ROM. She has not been evaluated since the incident happened. She is taking Aleeve with minimal relief.\par \par 1/19/2023\par Patient is here to follow up for MDS/MPN and treatment.\par She feel fine, has fatigue not worse than her baseline.\par She denies shortness of breath, chest pain, dizziness, headache or bleeding.\par She was evaluated at the ER on 1/5/2023 for her fall and imaging showed no fracture, except for mild joint effusion.\par \par 2/9/23\par Patient presents for follow up today. She complains of light-headedness. Her BP is elevated to 180/70 mmHg. She does not take any meds and says she is not diagnosed with HTN. She is due for Procrit & Luspatercept today. Reports her energy level is at baseline. No other complaints today.\par Her CMP showed serum glucose > 600 mg/dl, she was sent to ER with the transport.\par \par 3/2/32\par Patient here for follow up for MDS.\par She is scheduled for next luspatercept injection.\par She is status post port placement yesterday, so she has some discomfort at chest area from that.\par She feels somewhat weak, appetite is adequate, weight is stable.\par She is under the care of endocrinologist to try to get her DM under better control.\par She was recommended to take ASA 81 mg when she was discharged from the hospital.\par \par 3/23/23\par Patient here for follow up for MDS.\par She is scheduled for next Luspatercept injection.\par She is status post port placement\par She feels somewhat weak, appetite is adequate, weight is stable.\par She is under the care of endocrinologist to try to get her DM under better control.\par She was recommended to take ASA 81 mg when she was discharged from the hospital.\par \par 4/13/23\par Pt is here for follow up.\par Daughter is with her. Neuro eval has not been completed yet.\par No bleeding, SOB, pain\par recd 2 Units PRBCs 2 weeks ago\par \par 5/4/23\par Pt is here for follow up.\par Pt C/O not feeling well today. Denies pain. Has dizziness. Fasting bld sugar was over 350. She is not on insulin.\par Denies diarrhea, N, SOB, CP\par \par 5/25/23\par Pt is here for follow up, accompanied by formal caregiver.\par She feels well except for fatigue which is unchanged from baseline. She denies chest pain, shortness of breath, bleeding or decreased urinary output.\par She states that she saw nephro last week and follows up with her PCP for her diabetes. [de-identified] : \par

## 2023-05-25 NOTE — ASSESSMENT
[FreeTextEntry1] : Patient is a 78 year old female with Lowrisk myelodysplastic syndrome, previously treated with Revlimid and Procrit and Vidaza. 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 Pt is needing PRBCs almost every 3 weeks. Advised to follow up with Dr Ferrell to consider any other option or clinical trial. Called Dr Ferrell's office and discussed case with her. No clinical trial available, Rec PRBCS.\par \par S/P port placement on 3/1/23.\par \par PLAN:\par Previous notes reviewed including all relevant laboratory results and were communicated to the patient. \par \par -- Continue Luspatercept 1.75 mg/kg every 3 weeks, due today.  CBC today reviewed, moderate anemia noted 8.1 gm/dL and mild thrombocytosis 428 K/uL. \par SE discussed including: hypertension, abdominal pain, diarrhea, nausea, increased serum alanine aminotransferase, increased serum aspartate aminotransferase, increased serum bilirubin, dizziness, fatigue, headache, vertigo, arthralgia, musculoskeletal pain, ostealgia, decreased creatinine clearance, cough, dyspnea\par \par The therapy dose was adjusted according to pt's  labs and anticipated tolerance.\par The high risk of complications and complexity associated with this  therapy administration has been explained to the patient and her daughter and they were both agreeable.\par Treatment will be administered under my direct supervision.\par \par -- Hold Retacrit for now.\par -- Monitor CBC weekly and administer 1 pRBC unit  as needed when Hgb < 7 gm/dL.  Pt has e/o iron overload so transfusions need to be kept at a minimum if possible. Last transfusion was on 5/12/23.\par \par # Vitamin  B 12 deficiency\par -- Continue Vitamin B 12 injection every month, due today.\par \par # Thrombocytosis (controlled)\par -- previously on Hydrea.\par -- Will continue to monitor.\par \par # Right lower extremity edema\par -- Negative duplex done in Jan\par -- Continue to follow up with nephro as recommended.\par -- Emphasized to follow up with PCP.\par \par RTC in 3 weeks\par \par

## 2023-05-26 ENCOUNTER — NON-APPOINTMENT (OUTPATIENT)
Age: 78
End: 2023-05-26

## 2023-05-26 ENCOUNTER — EMERGENCY (EMERGENCY)
Facility: HOSPITAL | Age: 78
LOS: 0 days | Discharge: HOME | End: 2023-05-27
Attending: STUDENT IN AN ORGANIZED HEALTH CARE EDUCATION/TRAINING PROGRAM
Payer: MEDICARE

## 2023-05-26 ENCOUNTER — APPOINTMENT (OUTPATIENT)
Dept: INFUSION THERAPY | Facility: CLINIC | Age: 78
End: 2023-05-26

## 2023-05-26 VITALS
WEIGHT: 134.92 LBS | HEART RATE: 86 BPM | TEMPERATURE: 98 F | OXYGEN SATURATION: 100 % | SYSTOLIC BLOOD PRESSURE: 139 MMHG | RESPIRATION RATE: 18 BRPM | DIASTOLIC BLOOD PRESSURE: 81 MMHG

## 2023-05-26 VITALS
OXYGEN SATURATION: 100 % | SYSTOLIC BLOOD PRESSURE: 209 MMHG | HEART RATE: 87 BPM | RESPIRATION RATE: 18 BRPM | DIASTOLIC BLOOD PRESSURE: 90 MMHG | TEMPERATURE: 99 F

## 2023-05-26 DIAGNOSIS — D64.9 ANEMIA, UNSPECIFIED: ICD-10-CM

## 2023-05-26 DIAGNOSIS — D46.9 MYELODYSPLASTIC SYNDROME, UNSPECIFIED: ICD-10-CM

## 2023-05-26 DIAGNOSIS — E87.5 HYPERKALEMIA: ICD-10-CM

## 2023-05-26 DIAGNOSIS — Z98.890 OTHER SPECIFIED POSTPROCEDURAL STATES: Chronic | ICD-10-CM

## 2023-05-26 DIAGNOSIS — E53.8 DEFICIENCY OF OTHER SPECIFIED B GROUP VITAMINS: ICD-10-CM

## 2023-05-26 DIAGNOSIS — Z79.82 LONG TERM (CURRENT) USE OF ASPIRIN: ICD-10-CM

## 2023-05-26 DIAGNOSIS — Z79.84 LONG TERM (CURRENT) USE OF ORAL HYPOGLYCEMIC DRUGS: ICD-10-CM

## 2023-05-26 DIAGNOSIS — E11.9 TYPE 2 DIABETES MELLITUS WITHOUT COMPLICATIONS: ICD-10-CM

## 2023-05-26 LAB
ALBUMIN SERPL ELPH-MCNC: 4 G/DL — SIGNIFICANT CHANGE UP (ref 3.5–5.2)
ALBUMIN SERPL ELPH-MCNC: 4.3 G/DL
ALP BLD-CCNC: 126 U/L
ALP SERPL-CCNC: 125 U/L — HIGH (ref 30–115)
ALT FLD-CCNC: 23 U/L — SIGNIFICANT CHANGE UP (ref 0–41)
ALT SERPL-CCNC: 23 U/L
ANION GAP SERPL CALC-SCNC: 10 MMOL/L
ANION GAP SERPL CALC-SCNC: 10 MMOL/L — SIGNIFICANT CHANGE UP (ref 7–14)
APTT BLD: 29.8 SEC — SIGNIFICANT CHANGE UP (ref 27–39.2)
AST SERPL-CCNC: 20 U/L — SIGNIFICANT CHANGE UP (ref 0–41)
AST SERPL-CCNC: 22 U/L
BASE EXCESS BLDV CALC-SCNC: -4.5 MMOL/L — LOW (ref -2–3)
BASOPHILS # BLD AUTO: 0.05 K/UL — SIGNIFICANT CHANGE UP (ref 0–0.2)
BASOPHILS NFR BLD AUTO: 0.7 % — SIGNIFICANT CHANGE UP (ref 0–1)
BILIRUB SERPL-MCNC: 0.5 MG/DL — SIGNIFICANT CHANGE UP (ref 0.2–1.2)
BILIRUB SERPL-MCNC: 0.6 MG/DL
BLD GP AB SCN SERPL QL: SIGNIFICANT CHANGE UP
BUN SERPL-MCNC: 45 MG/DL — HIGH (ref 10–20)
BUN SERPL-MCNC: 52 MG/DL
CA-I SERPL-SCNC: 1.31 MMOL/L — SIGNIFICANT CHANGE UP (ref 1.15–1.33)
CALCIUM SERPL-MCNC: 8.8 MG/DL — SIGNIFICANT CHANGE UP (ref 8.4–10.4)
CALCIUM SERPL-MCNC: 9 MG/DL
CHLORIDE SERPL-SCNC: 101 MMOL/L
CHLORIDE SERPL-SCNC: 103 MMOL/L — SIGNIFICANT CHANGE UP (ref 98–110)
CO2 SERPL-SCNC: 20 MMOL/L — SIGNIFICANT CHANGE UP (ref 17–32)
CO2 SERPL-SCNC: 21 MMOL/L
CREAT SERPL-MCNC: 1.4 MG/DL — SIGNIFICANT CHANGE UP (ref 0.7–1.5)
CREAT SERPL-MCNC: 1.6 MG/DL
EGFR: 33 ML/MIN/1.73M2
EGFR: 39 ML/MIN/1.73M2 — LOW
EOSINOPHIL # BLD AUTO: 0.07 K/UL — SIGNIFICANT CHANGE UP (ref 0–0.7)
EOSINOPHIL NFR BLD AUTO: 1 % — SIGNIFICANT CHANGE UP (ref 0–8)
GAS PNL BLDV: 133 MMOL/L — LOW (ref 136–145)
GAS PNL BLDV: SIGNIFICANT CHANGE UP
GAS PNL BLDV: SIGNIFICANT CHANGE UP
GLUCOSE SERPL-MCNC: 262 MG/DL — HIGH (ref 70–99)
GLUCOSE SERPL-MCNC: 443 MG/DL
HCO3 BLDV-SCNC: 22 MMOL/L — SIGNIFICANT CHANGE UP (ref 22–29)
HCT VFR BLD CALC: 21.2 % — LOW (ref 37–47)
HCT VFR BLD CALC: 25.2 %
HCT VFR BLDA CALC: 22 % — LOW (ref 39–51)
HGB BLD CALC-MCNC: 7.2 G/DL — LOW (ref 12.6–17.4)
HGB BLD-MCNC: 6.9 G/DL — CRITICAL LOW (ref 12–16)
HGB BLD-MCNC: 8.1 G/DL
IMM GRANULOCYTES NFR BLD AUTO: 1.6 % — HIGH (ref 0.1–0.3)
INR BLD: 1.11 RATIO — SIGNIFICANT CHANGE UP (ref 0.65–1.3)
LACTATE BLDV-MCNC: 1.8 MMOL/L — SIGNIFICANT CHANGE UP (ref 0.5–2)
LYMPHOCYTES # BLD AUTO: 1.46 K/UL — SIGNIFICANT CHANGE UP (ref 1.2–3.4)
LYMPHOCYTES # BLD AUTO: 21.3 % — SIGNIFICANT CHANGE UP (ref 20.5–51.1)
MCHC RBC-ENTMCNC: 29.3 PG
MCHC RBC-ENTMCNC: 30 PG — SIGNIFICANT CHANGE UP (ref 27–31)
MCHC RBC-ENTMCNC: 32.1 G/DL
MCHC RBC-ENTMCNC: 32.5 G/DL — SIGNIFICANT CHANGE UP (ref 32–37)
MCV RBC AUTO: 91.3 FL
MCV RBC AUTO: 92.2 FL — SIGNIFICANT CHANGE UP (ref 81–99)
MONOCYTES # BLD AUTO: 0.84 K/UL — HIGH (ref 0.1–0.6)
MONOCYTES NFR BLD AUTO: 12.3 % — HIGH (ref 1.7–9.3)
NEUTROPHILS # BLD AUTO: 4.32 K/UL — SIGNIFICANT CHANGE UP (ref 1.4–6.5)
NEUTROPHILS NFR BLD AUTO: 63.1 % — SIGNIFICANT CHANGE UP (ref 42.2–75.2)
NRBC # BLD: 0 /100 WBCS — SIGNIFICANT CHANGE UP (ref 0–0)
PCO2 BLDV: 44 MMHG — HIGH (ref 39–42)
PH BLDV: 7.3 — LOW (ref 7.32–7.43)
PLATELET # BLD AUTO: 386 K/UL — SIGNIFICANT CHANGE UP (ref 130–400)
PLATELET # BLD AUTO: 428 K/UL
PMV BLD: 10.5 FL — HIGH (ref 7.4–10.4)
PMV BLD: 9.8 FL
PO2 BLDV: 33 MMHG — SIGNIFICANT CHANGE UP
POTASSIUM BLDV-SCNC: 5.3 MMOL/L — HIGH (ref 3.5–5.1)
POTASSIUM SERPL-MCNC: 4.9 MMOL/L — SIGNIFICANT CHANGE UP (ref 3.5–5)
POTASSIUM SERPL-SCNC: 4.9 MMOL/L — SIGNIFICANT CHANGE UP (ref 3.5–5)
POTASSIUM SERPL-SCNC: 6.3 MMOL/L
PROT SERPL-MCNC: 6 G/DL — SIGNIFICANT CHANGE UP (ref 6–8)
PROT SERPL-MCNC: 6.3 G/DL
PROTHROM AB SERPL-ACNC: 12.7 SEC — SIGNIFICANT CHANGE UP (ref 9.95–12.87)
RBC # BLD: 2.3 M/UL — LOW (ref 4.2–5.4)
RBC # BLD: 2.76 M/UL
RBC # FLD: 18 % — HIGH (ref 11.5–14.5)
RBC # FLD: 18.1 %
SAO2 % BLDV: 59.2 % — SIGNIFICANT CHANGE UP
SODIUM SERPL-SCNC: 132 MMOL/L
SODIUM SERPL-SCNC: 133 MMOL/L — LOW (ref 135–146)
WBC # BLD: 6.85 K/UL — SIGNIFICANT CHANGE UP (ref 4.8–10.8)
WBC # FLD AUTO: 6.85 K/UL — SIGNIFICANT CHANGE UP (ref 4.8–10.8)
WBC # FLD AUTO: 7.01 K/UL

## 2023-05-26 PROCEDURE — 86923 COMPATIBILITY TEST ELECTRIC: CPT

## 2023-05-26 PROCEDURE — 85018 HEMOGLOBIN: CPT

## 2023-05-26 PROCEDURE — 83605 ASSAY OF LACTIC ACID: CPT

## 2023-05-26 PROCEDURE — 93010 ELECTROCARDIOGRAM REPORT: CPT

## 2023-05-26 PROCEDURE — 93005 ELECTROCARDIOGRAM TRACING: CPT

## 2023-05-26 PROCEDURE — 85025 COMPLETE CBC W/AUTO DIFF WBC: CPT

## 2023-05-26 PROCEDURE — 84132 ASSAY OF SERUM POTASSIUM: CPT

## 2023-05-26 PROCEDURE — 82330 ASSAY OF CALCIUM: CPT

## 2023-05-26 PROCEDURE — P9040: CPT

## 2023-05-26 PROCEDURE — 86900 BLOOD TYPING SEROLOGIC ABO: CPT

## 2023-05-26 PROCEDURE — 84295 ASSAY OF SERUM SODIUM: CPT

## 2023-05-26 PROCEDURE — 99223 1ST HOSP IP/OBS HIGH 75: CPT

## 2023-05-26 PROCEDURE — 36430 TRANSFUSION BLD/BLD COMPNT: CPT

## 2023-05-26 PROCEDURE — G0378: CPT

## 2023-05-26 PROCEDURE — 86850 RBC ANTIBODY SCREEN: CPT

## 2023-05-26 PROCEDURE — 80053 COMPREHEN METABOLIC PANEL: CPT

## 2023-05-26 PROCEDURE — 85014 HEMATOCRIT: CPT

## 2023-05-26 PROCEDURE — 99283 EMERGENCY DEPT VISIT LOW MDM: CPT | Mod: 25

## 2023-05-26 PROCEDURE — 85730 THROMBOPLASTIN TIME PARTIAL: CPT

## 2023-05-26 PROCEDURE — 86901 BLOOD TYPING SEROLOGIC RH(D): CPT

## 2023-05-26 PROCEDURE — 82803 BLOOD GASES ANY COMBINATION: CPT

## 2023-05-26 PROCEDURE — 36415 COLL VENOUS BLD VENIPUNCTURE: CPT

## 2023-05-26 PROCEDURE — 85610 PROTHROMBIN TIME: CPT

## 2023-05-26 RX ORDER — LINAGLIPTIN 5 MG/1
5 TABLET, FILM COATED ORAL DAILY
Refills: 0 | Status: DISCONTINUED | OUTPATIENT
Start: 2023-05-26 | End: 2023-05-27

## 2023-05-26 NOTE — ED CDU PROVIDER DISPOSITION NOTE - CLINICAL COURSE
77 yo f with pmh of mds, dm, f/u with dr. moreno, sent in for hyperkalemia.  as per family they had labs done yesterday, weekly draws to keep her hgb above 7 and watch her kidney function.  found to have k of 6.3 so sent in for evaluation.  pt has no sx currently.  as per family, they were told her hgb was 8.6.  normally if on thursday her hgb Is low, she is told to come back on friday for transfusion.  no cp, sob, rectal bleeding.  no fever or chills.  exam: nad, ncat, perrl, eomi, mmm, rrr, ctab, abd soft, nt, nd aox3, imp: pt with c/o hyperkalemia on outpt labs, will rpt, ekg reassuring, no peaked t waves. Rpt K wnl. Labs personally reviewed. Patient placed in obs for 1U PRBC, which were transfused uneventfully, and well appearing on reassessment.  Patient given return precautions, f/u instructions and verbalizes understanding.

## 2023-05-26 NOTE — ED CDU PROVIDER INITIAL DAY NOTE - PROGRESS NOTE DETAILS
received signout from Chuckie Posada - pt in obs for transfusion of 1 unit of blood; pt sent by oncologist Dr. Wade for elevated K; repeat k nl; h/h 6.9 with hx mds - baseline 7; pt to received 1 unit and d/c home;

## 2023-05-26 NOTE — ED CDU PROVIDER INITIAL DAY NOTE - OBJECTIVE STATEMENT
78-year-old female history of MDS, diabetes presenting to ED for abnormal lab results.  Patient is sore doctor yesterday Dr. Wills who did blood work on blood work was found to have K of 6.3.  Sent patient into ED for evaluation.  Patient states that she is having some associated abdominal cramping as well as nonbloody diarrhea.

## 2023-05-26 NOTE — ED PROVIDER NOTE - CLINICAL SUMMARY MEDICAL DECISION MAKING FREE TEXT BOX
Pt with mds, sent in for hyperkalemia.  on rpt labs, normal K, but anemic.  will start transfusion and place in obs.  Any ordered labs and EKG were reviewed.  Any imaging was ordered and reviewed by me.  Appropriate medications for patient's presenting complaints were ordered and effects were reassessed.  Patient's records (prior hospital, ED visit, and/or nursing home notes if available) were reviewed.  Additional history was obtained from EMS, family, and/or PCP (where available).  Escalation to admission/observation was considered.  Patient requires inpatient hospitalization - monitored setting.

## 2023-05-26 NOTE — ED PROVIDER NOTE - OBJECTIVE STATEMENT
78-year-old female history of MDS, diabetes presenting to ED for abnormal lab results.  Patient is sore doctor yesterday Dr. Wills who did blood work on blood work was found to have K of 6.3.  Sent patient into ED for evaluation.  Patient states that she is having some associated abdominal cramping as well as nonbloody diarrhea. 78-year-old female history of MDS, diabetes presenting to ED for abnormal lab results.  Patient is sore doctor yesterday Dr. Mendez who did blood work on blood work was found to have K of 6.3.  Sent patient into ED for evaluation.  Patient states that she is having some associated abdominal cramping as well as nonbloody diarrhea.

## 2023-05-26 NOTE — ED CDU PROVIDER DISPOSITION NOTE - NSFOLLOWUPINSTRUCTIONS_ED_ALL_ED_FT
Blood Transfusion    WHAT YOU NEED TO KNOW:    A blood transfusion is used to give you blood through an IV. You may get only part of the blood, such as red blood cells, platelets, or plasma. The blood may be from you and stored for you to use later. The blood may instead be from another person. Donated blood is tested for HIV, hepatitis, syphilis, West Nile virus, and other diseases.    DISCHARGE INSTRUCTIONS:    Call 911 for any of the following:     You have a skin rash, hives, swelling, or itching.       You have trouble breathing, shortness of breath, wheezing, or coughing.      Your throat tightens or your lips or tongue swell.      You have difficulty swallowing or speaking.    Seek care immediately if:     You develop a high fever and chills.       You are dizzy, lightheaded, confused, or feel like you are going to faint.      You have nausea, diarrhea, or abdominal cramps, or you are vomiting.      You urinate little or not at all.      You develop headaches or double vision.      Your skin or the whites of your eyes look yellow.      You see pinpoint purple spots or purple patches on your body.       You have a seizure.     Contact your healthcare provider if:     You feel tired and weak within 10 days of your transfusion.      You have questions or concerns about blood transfusions.    Medicines:     Antihistamines may help stop mild itching or a rash.      Epinephrine is emergency medicine used to stop anaphylaxis. You may be given epinephrine if you are at risk for anaphylaxis. Your healthcare provider will teach you how to use it.      Take your medicine as directed. Contact your healthcare provider if you think your medicine is not helping or if you have side effects. Tell him or her if you are allergic to any medicine. Keep a list of the medicines, vitamins, and herbs you take. Include the amounts, and when and why you take them. Bring the list or the pill bottles to follow-up visits. Carry your medicine list with you in case of an emergency.    Apply ice to decrease pain and swelling. Use an ice pack, or put ice in a plastic bag and wrap a towel around it. Apply the ice pack or wrapped bag to your transfusion site for 20 minutes each hour or as directed.     Follow up with your healthcare provider as directed: Write down your questions so you remember to ask them during your visits.       © Copyright [employer] 2019 All illustrations and images included in CareNotes are the copyrighted property of [employer] or [employer].        Anemia    Anemia is a condition in which the concentration of red blood cells or hemoglobin in the blood is below normal. Hemoglobin is a substance in red blood cells that carries oxygen to the tissues of the body. Anemia results in not enough oxygen reaching these tissues which can cause symptoms such as weakness, dizziness/lightheadedness, shortness of breath, chest pain, paleness, or nausea.    SEEK IMMEDIATE MEDICAL CARE IF YOU HAVE THE FOLLOWING SYMPTOMS: extreme weakness/chest pain/shortness of breath, black or bloody stools, vomiting blood, fainting, fever, or any signs of dehydration.

## 2023-05-26 NOTE — ED ADULT TRIAGE NOTE - SOURCE OF INFORMATION
Patient Helical Rim Advancement Flap Text: The defect edges were debeveled with a #15 blade scalpel.  Given the location of the defect and the proximity to free margins (helical rim) a double helical rim advancement flap was deemed most appropriate.  Using a sterile surgical marker, the appropriate advancement flaps were drawn incorporating the defect and placing the expected incisions between the helical rim and antihelix where possible.  The area thus outlined was incised through and through with a #15 scalpel blade.  With a skin hook and iris scissors, the flaps were gently and sharply undermined and freed up.

## 2023-05-26 NOTE — ED PROVIDER NOTE - IV ALTEPLASE EXCL REL HIDDEN
Patient provided printed discharge instructions which included signs and symptoms to look out for, why to return to ER, and other follow up appointment to make. Patient provided prescription, information on medication, and how to . Patient stated they understand discharge instructions and had no further questions or concerns at this time. Patient discharged to home with friend. Patient ambulated out of ER with stable gait.   show

## 2023-05-26 NOTE — ED CDU PROVIDER DISPOSITION NOTE - PATIENT PORTAL LINK FT
You can access the FollowMyHealth Patient Portal offered by [facility] by registering at the following website: [email or web address]. By joining [email or web address]’s FollowMyHealth portal, you will also be able to view your health information using other applications (apps) compatible with our system.

## 2023-05-26 NOTE — ED ADULT NURSE NOTE - NSFALLUNIVINTERV_ED_ALL_ED
Bed/Stretcher in lowest position, wheels locked, appropriate side rails in place/Call bell, personal items and telephone in reach/Instruct patient to call for assistance before getting out of bed/chair/stretcher/Non-slip footwear applied when patient is off stretcher/[place] to call system/Physically safe environment - no spills, clutter or unnecessary equipment/Purposeful proactive rounding/Room/bathroom lighting operational, light cord in reach

## 2023-05-26 NOTE — ED PROVIDER NOTE - ATTENDING APP SHARED VISIT CONTRIBUTION OF CARE
77 yo f with pmh of mds, dm, f/u with dr. moreno, sent in for hyperkalemia.  as per family they had labs done yesterday, weekly draws to keep her hgb above 7 and watch her kidney function.  found to have k of 6.3 so sent in for evaluation.  pt has no sx currently.  as per family, they were told her hgb was 8.6.  normally if on thursday her hgb Is low, she is told to come back on friday for transfusion.  no cp, sob, rectal bleeding.  no fever or chills.  exam: nad, ncat, perrl, eomi, mmm, rrr, ctab, abd soft, nt, nd aox3, imp: pt with c/o hyperkalemia on outpt labs, will rpt, ekg reassuring, no peaked t waves

## 2023-05-26 NOTE — ED CDU PROVIDER INITIAL DAY NOTE - CLINICAL SUMMARY MEDICAL DECISION MAKING FREE TEXT BOX
79 yo f with pmh of mds, dm, f/u with dr. moreno, sent in for hyperkalemia.  as per family they had labs done yesterday, weekly draws to keep her hgb above 7 and watch her kidney function.  found to have k of 6.3 so sent in for evaluation.  pt has no sx currently.  as per family, they were told her hgb was 8.6.  normally if on thursday her hgb Is low, she is told to come back on friday for transfusion.  no cp, sob, rectal bleeding.  no fever or chills.  exam: nad, ncat, perrl, eomi, mmm, rrr, ctab, abd soft, nt, nd aox3, imp: pt with c/o hyperkalemia on outpt labs, will rpt, ekg reassuring, no peaked t waves. Rpt K wnl. Labs personally reviewed. Patient placed in obs for 1U PRBC.

## 2023-05-26 NOTE — ED PROVIDER NOTE - PHYSICAL EXAMINATION
VITAL SIGNS: I have reviewed nursing notes and confirm.  CONSTITUTIONAL: in no acute distress.  SKIN: Skin exam is warm and dry, no acute rash. pt appears pale   HEAD: Normocephalic; atraumatic.  EYES: PERRL, EOM intact; conjunctiva and sclera clear.  ENT: No nasal discharge; airway clear. TMs clear.  NECK: Supple; non tender.  CARD: S1, S2 normal; no murmurs, gallops, or rubs. Regular rate and rhythm.  RESP: No wheezes, rales or rhonchi. Speaking in full sentences.   ABD: Normal bowel sounds; soft; non-distended; non-tender; No rebound or guarding. No CVA tenderness.  EXT: Normal ROM. No clubbing, cyanosis or edema.

## 2023-05-27 RX ADMIN — Medication 100 UNIT(S): at 01:22

## 2023-06-01 ENCOUNTER — OUTPATIENT (OUTPATIENT)
Dept: OUTPATIENT SERVICES | Facility: HOSPITAL | Age: 78
LOS: 1 days | End: 2023-06-01
Payer: MEDICARE

## 2023-06-01 ENCOUNTER — LABORATORY RESULT (OUTPATIENT)
Age: 78
End: 2023-06-01

## 2023-06-01 ENCOUNTER — NON-APPOINTMENT (OUTPATIENT)
Age: 78
End: 2023-06-01

## 2023-06-01 ENCOUNTER — APPOINTMENT (OUTPATIENT)
Dept: HEMATOLOGY ONCOLOGY | Facility: CLINIC | Age: 78
End: 2023-06-01

## 2023-06-01 DIAGNOSIS — Z98.890 OTHER SPECIFIED POSTPROCEDURAL STATES: Chronic | ICD-10-CM

## 2023-06-01 DIAGNOSIS — E87.5 HYPERKALEMIA: ICD-10-CM

## 2023-06-01 LAB
HCT VFR BLD CALC: 25.7 %
HGB BLD-MCNC: 8.6 G/DL
MCHC RBC-ENTMCNC: 30.2 PG
MCHC RBC-ENTMCNC: 33.5 G/DL
MCV RBC AUTO: 90.2 FL
PLATELET # BLD AUTO: 346 K/UL
PMV BLD: 10.2 FL
RBC # BLD: 2.85 M/UL
RBC # FLD: 17.5 %
WBC # FLD AUTO: 5.79 K/UL

## 2023-06-01 PROCEDURE — 85027 COMPLETE CBC AUTOMATED: CPT

## 2023-06-02 ENCOUNTER — APPOINTMENT (OUTPATIENT)
Dept: INFUSION THERAPY | Facility: CLINIC | Age: 78
End: 2023-06-02

## 2023-06-02 DIAGNOSIS — E87.5 HYPERKALEMIA: ICD-10-CM

## 2023-06-08 ENCOUNTER — LABORATORY RESULT (OUTPATIENT)
Age: 78
End: 2023-06-08

## 2023-06-08 ENCOUNTER — APPOINTMENT (OUTPATIENT)
Dept: HEMATOLOGY ONCOLOGY | Facility: CLINIC | Age: 78
End: 2023-06-08
Payer: MEDICARE

## 2023-06-08 ENCOUNTER — OUTPATIENT (OUTPATIENT)
Dept: OUTPATIENT SERVICES | Facility: HOSPITAL | Age: 78
LOS: 1 days | End: 2023-06-08
Payer: MEDICARE

## 2023-06-08 ENCOUNTER — NON-APPOINTMENT (OUTPATIENT)
Age: 78
End: 2023-06-08

## 2023-06-08 DIAGNOSIS — E87.5 HYPERKALEMIA: ICD-10-CM

## 2023-06-08 DIAGNOSIS — Z98.890 OTHER SPECIFIED POSTPROCEDURAL STATES: Chronic | ICD-10-CM

## 2023-06-08 LAB
HCT VFR BLD CALC: 23.9 %
HGB BLD-MCNC: 7.8 G/DL
MCHC RBC-ENTMCNC: 29.8 PG
MCHC RBC-ENTMCNC: 32.6 G/DL
MCV RBC AUTO: 91.2 FL
PLATELET # BLD AUTO: 525 K/UL
PMV BLD: 10.3 FL
RBC # BLD: 2.62 M/UL
RBC # FLD: 18.5 %
WBC # FLD AUTO: 8.64 K/UL

## 2023-06-08 PROCEDURE — 99213 OFFICE O/P EST LOW 20 MIN: CPT

## 2023-06-08 PROCEDURE — 85027 COMPLETE CBC AUTOMATED: CPT

## 2023-06-09 ENCOUNTER — APPOINTMENT (OUTPATIENT)
Dept: INFUSION THERAPY | Facility: CLINIC | Age: 78
End: 2023-06-09

## 2023-06-15 ENCOUNTER — APPOINTMENT (OUTPATIENT)
Dept: INFUSION THERAPY | Facility: CLINIC | Age: 78
End: 2023-06-15
Payer: MEDICARE

## 2023-06-15 ENCOUNTER — NON-APPOINTMENT (OUTPATIENT)
Age: 78
End: 2023-06-15

## 2023-06-15 ENCOUNTER — APPOINTMENT (OUTPATIENT)
Dept: HEMATOLOGY ONCOLOGY | Facility: CLINIC | Age: 78
End: 2023-06-15
Payer: MEDICARE

## 2023-06-15 ENCOUNTER — OUTPATIENT (OUTPATIENT)
Dept: OUTPATIENT SERVICES | Facility: HOSPITAL | Age: 78
LOS: 1 days | End: 2023-06-15
Payer: MEDICARE

## 2023-06-15 ENCOUNTER — LABORATORY RESULT (OUTPATIENT)
Age: 78
End: 2023-06-15

## 2023-06-15 VITALS
TEMPERATURE: 98.1 F | SYSTOLIC BLOOD PRESSURE: 144 MMHG | HEART RATE: 80 BPM | RESPIRATION RATE: 16 BRPM | HEIGHT: 62 IN | DIASTOLIC BLOOD PRESSURE: 68 MMHG

## 2023-06-15 DIAGNOSIS — D75.839 THROMBOCYTOSIS, UNSPECIFIED: ICD-10-CM

## 2023-06-15 DIAGNOSIS — Z98.890 OTHER SPECIFIED POSTPROCEDURAL STATES: Chronic | ICD-10-CM

## 2023-06-15 DIAGNOSIS — E87.5 HYPERKALEMIA: ICD-10-CM

## 2023-06-15 DIAGNOSIS — D64.9 ANEMIA, UNSPECIFIED: ICD-10-CM

## 2023-06-15 LAB
HCT VFR BLD CALC: 20.5 %
HGB BLD-MCNC: 6.6 G/DL
MCHC RBC-ENTMCNC: 29.7 PG
MCHC RBC-ENTMCNC: 32.2 G/DL
MCV RBC AUTO: 92.3 FL
PLATELET # BLD AUTO: 394 K/UL
PMV BLD: 9.8 FL
RBC # BLD: 2.22 M/UL
RBC # FLD: 19.5 %
WBC # FLD AUTO: 8.32 K/UL

## 2023-06-15 PROCEDURE — 99214 OFFICE O/P EST MOD 30 MIN: CPT

## 2023-06-15 PROCEDURE — 96372 THER/PROPH/DIAG INJ SC/IM: CPT

## 2023-06-15 PROCEDURE — 86900 BLOOD TYPING SEROLOGIC ABO: CPT

## 2023-06-15 PROCEDURE — 36415 COLL VENOUS BLD VENIPUNCTURE: CPT

## 2023-06-15 PROCEDURE — 85027 COMPLETE CBC AUTOMATED: CPT

## 2023-06-15 PROCEDURE — 99214 OFFICE O/P EST MOD 30 MIN: CPT | Mod: 25

## 2023-06-15 PROCEDURE — 86901 BLOOD TYPING SEROLOGIC RH(D): CPT

## 2023-06-15 PROCEDURE — 86923 COMPATIBILITY TEST ELECTRIC: CPT

## 2023-06-15 PROCEDURE — 86850 RBC ANTIBODY SCREEN: CPT

## 2023-06-15 RX ORDER — LUSPATERCEPT 75 MG/1
110 INJECTION, POWDER, LYOPHILIZED, FOR SOLUTION SUBCUTANEOUS ONCE
Refills: 0 | Status: COMPLETED | OUTPATIENT
Start: 2023-06-15 | End: 2023-06-15

## 2023-06-15 RX ORDER — PREGABALIN 225 MG/1
1000 CAPSULE ORAL ONCE
Refills: 0 | Status: COMPLETED | OUTPATIENT
Start: 2023-06-15 | End: 2023-06-15

## 2023-06-15 RX ADMIN — LUSPATERCEPT 110 MILLIGRAM(S): 75 INJECTION, POWDER, LYOPHILIZED, FOR SOLUTION SUBCUTANEOUS at 16:23

## 2023-06-15 RX ADMIN — PREGABALIN 1000 MICROGRAM(S): 225 CAPSULE ORAL at 16:22

## 2023-06-16 ENCOUNTER — APPOINTMENT (OUTPATIENT)
Dept: INFUSION THERAPY | Facility: CLINIC | Age: 78
End: 2023-06-16

## 2023-06-16 ENCOUNTER — OUTPATIENT (OUTPATIENT)
Dept: OUTPATIENT SERVICES | Facility: HOSPITAL | Age: 78
LOS: 1 days | End: 2023-06-16
Payer: MEDICARE

## 2023-06-16 ENCOUNTER — LABORATORY RESULT (OUTPATIENT)
Age: 78
End: 2023-06-16

## 2023-06-16 DIAGNOSIS — E87.5 HYPERKALEMIA: ICD-10-CM

## 2023-06-16 PROCEDURE — P9040: CPT

## 2023-06-16 PROCEDURE — 85027 COMPLETE CBC AUTOMATED: CPT

## 2023-06-16 PROCEDURE — 36415 COLL VENOUS BLD VENIPUNCTURE: CPT

## 2023-06-16 PROCEDURE — 83036 HEMOGLOBIN GLYCOSYLATED A1C: CPT

## 2023-06-16 PROCEDURE — 84436 ASSAY OF TOTAL THYROXINE: CPT

## 2023-06-16 PROCEDURE — 80061 LIPID PANEL: CPT

## 2023-06-16 PROCEDURE — 36430 TRANSFUSION BLD/BLD COMPNT: CPT

## 2023-06-16 PROCEDURE — 84443 ASSAY THYROID STIM HORMONE: CPT

## 2023-06-16 PROCEDURE — 80053 COMPREHEN METABOLIC PANEL: CPT

## 2023-06-17 ENCOUNTER — EMERGENCY (EMERGENCY)
Facility: HOSPITAL | Age: 78
LOS: 0 days | Discharge: HOME | End: 2023-06-17
Attending: EMERGENCY MEDICINE
Payer: MEDICARE

## 2023-06-17 VITALS
HEIGHT: 62 IN | RESPIRATION RATE: 20 BRPM | DIASTOLIC BLOOD PRESSURE: 74 MMHG | WEIGHT: 138.89 LBS | HEART RATE: 91 BPM | SYSTOLIC BLOOD PRESSURE: 192 MMHG | OXYGEN SATURATION: 100 % | TEMPERATURE: 98 F

## 2023-06-17 VITALS
HEART RATE: 87 BPM | OXYGEN SATURATION: 100 % | RESPIRATION RATE: 19 BRPM | SYSTOLIC BLOOD PRESSURE: 170 MMHG | TEMPERATURE: 98 F | DIASTOLIC BLOOD PRESSURE: 70 MMHG

## 2023-06-17 DIAGNOSIS — D46.9 MYELODYSPLASTIC SYNDROME, UNSPECIFIED: ICD-10-CM

## 2023-06-17 DIAGNOSIS — Z98.890 OTHER SPECIFIED POSTPROCEDURAL STATES: Chronic | ICD-10-CM

## 2023-06-17 DIAGNOSIS — Z98.890 OTHER SPECIFIED POSTPROCEDURAL STATES: ICD-10-CM

## 2023-06-17 DIAGNOSIS — E87.5 HYPERKALEMIA: ICD-10-CM

## 2023-06-17 DIAGNOSIS — E11.9 TYPE 2 DIABETES MELLITUS WITHOUT COMPLICATIONS: ICD-10-CM

## 2023-06-17 DIAGNOSIS — R79.89 OTHER SPECIFIED ABNORMAL FINDINGS OF BLOOD CHEMISTRY: ICD-10-CM

## 2023-06-17 DIAGNOSIS — Z79.82 LONG TERM (CURRENT) USE OF ASPIRIN: ICD-10-CM

## 2023-06-17 DIAGNOSIS — Z79.84 LONG TERM (CURRENT) USE OF ORAL HYPOGLYCEMIC DRUGS: ICD-10-CM

## 2023-06-17 LAB
ALBUMIN SERPL ELPH-MCNC: 3.6 G/DL — SIGNIFICANT CHANGE UP (ref 3.5–5.2)
ALP SERPL-CCNC: 187 U/L — HIGH (ref 30–115)
ALT FLD-CCNC: 36 U/L — SIGNIFICANT CHANGE UP (ref 0–41)
ANION GAP SERPL CALC-SCNC: 14 MMOL/L — SIGNIFICANT CHANGE UP (ref 7–14)
AST SERPL-CCNC: 38 U/L — SIGNIFICANT CHANGE UP (ref 0–41)
BASOPHILS # BLD AUTO: 0.06 K/UL — SIGNIFICANT CHANGE UP (ref 0–0.2)
BASOPHILS NFR BLD AUTO: 1.1 % — HIGH (ref 0–1)
BILIRUB SERPL-MCNC: 0.7 MG/DL — SIGNIFICANT CHANGE UP (ref 0.2–1.2)
BUN SERPL-MCNC: 39 MG/DL — HIGH (ref 10–20)
CALCIUM SERPL-MCNC: 8.6 MG/DL — SIGNIFICANT CHANGE UP (ref 8.4–10.5)
CHLORIDE SERPL-SCNC: 100 MMOL/L — SIGNIFICANT CHANGE UP (ref 98–110)
CO2 SERPL-SCNC: 16 MMOL/L — LOW (ref 17–32)
CREAT SERPL-MCNC: 1.4 MG/DL — SIGNIFICANT CHANGE UP (ref 0.7–1.5)
EGFR: 39 ML/MIN/1.73M2 — LOW
EOSINOPHIL # BLD AUTO: 0.06 K/UL — SIGNIFICANT CHANGE UP (ref 0–0.7)
EOSINOPHIL NFR BLD AUTO: 1.1 % — SIGNIFICANT CHANGE UP (ref 0–8)
GLUCOSE SERPL-MCNC: 341 MG/DL — HIGH (ref 70–99)
HCT VFR BLD CALC: 25.8 % — LOW (ref 37–47)
HGB BLD-MCNC: 8.6 G/DL — LOW (ref 12–16)
IMM GRANULOCYTES NFR BLD AUTO: 1.4 % — HIGH (ref 0.1–0.3)
LYMPHOCYTES # BLD AUTO: 1.03 K/UL — LOW (ref 1.2–3.4)
LYMPHOCYTES # BLD AUTO: 18.6 % — LOW (ref 20.5–51.1)
MCHC RBC-ENTMCNC: 29.7 PG — SIGNIFICANT CHANGE UP (ref 27–31)
MCHC RBC-ENTMCNC: 33.3 G/DL — SIGNIFICANT CHANGE UP (ref 32–37)
MCV RBC AUTO: 89 FL — SIGNIFICANT CHANGE UP (ref 81–99)
MONOCYTES # BLD AUTO: 0.65 K/UL — HIGH (ref 0.1–0.6)
MONOCYTES NFR BLD AUTO: 11.8 % — HIGH (ref 1.7–9.3)
NEUTROPHILS # BLD AUTO: 3.65 K/UL — SIGNIFICANT CHANGE UP (ref 1.4–6.5)
NEUTROPHILS NFR BLD AUTO: 66 % — SIGNIFICANT CHANGE UP (ref 42.2–75.2)
NRBC # BLD: 0 /100 WBCS — SIGNIFICANT CHANGE UP (ref 0–0)
PLATELET # BLD AUTO: 361 K/UL — SIGNIFICANT CHANGE UP (ref 130–400)
PMV BLD: 10.7 FL — HIGH (ref 7.4–10.4)
POTASSIUM SERPL-MCNC: 5.3 MMOL/L — HIGH (ref 3.5–5)
POTASSIUM SERPL-MCNC: 6.4 MMOL/L — CRITICAL HIGH (ref 3.5–5)
POTASSIUM SERPL-SCNC: 5.3 MMOL/L — HIGH (ref 3.5–5)
POTASSIUM SERPL-SCNC: 6.4 MMOL/L — CRITICAL HIGH (ref 3.5–5)
PROT SERPL-MCNC: 6.1 G/DL — SIGNIFICANT CHANGE UP (ref 6–8)
RBC # BLD: 2.9 M/UL — LOW (ref 4.2–5.4)
RBC # FLD: 17.7 % — HIGH (ref 11.5–14.5)
SODIUM SERPL-SCNC: 130 MMOL/L — LOW (ref 135–146)
WBC # BLD: 5.53 K/UL — SIGNIFICANT CHANGE UP (ref 4.8–10.8)
WBC # FLD AUTO: 5.53 K/UL — SIGNIFICANT CHANGE UP (ref 4.8–10.8)

## 2023-06-17 PROCEDURE — 93010 ELECTROCARDIOGRAM REPORT: CPT

## 2023-06-17 PROCEDURE — 80053 COMPREHEN METABOLIC PANEL: CPT

## 2023-06-17 PROCEDURE — 99283 EMERGENCY DEPT VISIT LOW MDM: CPT | Mod: 25

## 2023-06-17 PROCEDURE — 99284 EMERGENCY DEPT VISIT MOD MDM: CPT

## 2023-06-17 PROCEDURE — 93005 ELECTROCARDIOGRAM TRACING: CPT

## 2023-06-17 PROCEDURE — 84132 ASSAY OF SERUM POTASSIUM: CPT

## 2023-06-17 PROCEDURE — 85025 COMPLETE CBC W/AUTO DIFF WBC: CPT

## 2023-06-17 PROCEDURE — 36415 COLL VENOUS BLD VENIPUNCTURE: CPT

## 2023-06-17 NOTE — ED PROVIDER NOTE - PHYSICAL EXAMINATION
_  Vital signs reviewed; ABCs intact  GENERAL: Well nourished, comfortable  SKIN: Warm, dry  HEAD & NECK: NCAT, supple neck  EYES: EOMI, PER B/L  ENT: MMM  CARD: RRR, S1, S2  RESP: Normal respiratory effort, CTAB  ABD: Soft, ND, NT, no rebound, no guarding  EXT: Pulses palpable distally  NEUROMSK: Grossly intact  PSYCH: AAOx3, cooperative, appropriate

## 2023-06-17 NOTE — ED PROVIDER NOTE - OBJECTIVE STATEMENT
Patient is a 78-year-old woman with a history of MDS, diabetes, presenting from her hematologist office for potassium level of 6.6 (hemolyzed).  No CP, palpitations, SOB. No other complaints - no fever, cough, abd pain, NVD.

## 2023-06-17 NOTE — ED PROVIDER NOTE - NSFOLLOWUPINSTRUCTIONS_ED_ALL_ED_FT
A medical screening exam has been done. This exam is meant to determine whether you need emergency treatment. Your exam has not demonstrated the need for emergency treatment at this point. It is safe for you to go to your caregiver's office or clinic for further evaluation and/or treatment. You should make an appointment to see your caregiver as soon as he or she is available.

## 2023-06-17 NOTE — ED PROVIDER NOTE - CLINICAL SUMMARY MEDICAL DECISION MAKING FREE TEXT BOX
Patient presented with reported hyperkalemia as outpatient. Otherwise completely asymptomatic. Otherwise afebrile, HD stable. EKG obtained and unremarkable without hyperkalemic changes. Labs obtained and showed (+) potassium of 5.3 non-hemolyzed, but normal renal function and no other significant abnormalities. Given the above, no indication for emergent treatment at this time and will discharge home with outpatient follow up. Patient agreeable with plan. Agrees to return to ED for any new or worsening symptoms.

## 2023-06-17 NOTE — ED PROVIDER NOTE - PATIENT PORTAL LINK FT
You can access the FollowMyHealth Patient Portal offered by [facility] by registering at the following website: [email or web address]. By joining [employer]’s FollowMyHealth portal, you will also be able to view your health information using other applications (apps) compatible with our system.

## 2023-06-17 NOTE — ED PROVIDER NOTE - PROGRESS NOTE DETAILS
Resident AO: ECG without hyperK changes. Results reviewed and discussed with patient. All questions answered, patient expressed understanding, and agreed with outpatient follow up. Return precautions given.

## 2023-06-19 LAB
ABO + RH PNL BLD: NORMAL
ALBUMIN SERPL ELPH-MCNC: 4.3 G/DL
ALP BLD-CCNC: 185 U/L
ALT SERPL-CCNC: 36 U/L
ANION GAP SERPL CALC-SCNC: 11 MMOL/L
AST SERPL-CCNC: 24 U/L
BILIRUB SERPL-MCNC: 0.7 MG/DL
BLD GP AB SCN SERPL QL: NORMAL
BUN SERPL-MCNC: 43 MG/DL
CALCIUM SERPL-MCNC: 9.2 MG/DL
CHLORIDE SERPL-SCNC: 105 MMOL/L
CHOLEST SERPL-MCNC: 101 MG/DL
CO2 SERPL-SCNC: 17 MMOL/L
CREAT SERPL-MCNC: 1.4 MG/DL
EGFR: 39 ML/MIN/1.73M2
ESTIMATED AVERAGE GLUCOSE: 197 MG/DL
GLUCOSE SERPL-MCNC: 206 MG/DL
HBA1C MFR BLD HPLC: 8.5 %
HCT VFR BLD CALC: 18.2 %
HDLC SERPL-MCNC: 57 MG/DL
HGB BLD-MCNC: 6.1 G/DL
LDLC SERPL CALC-MCNC: 30 MG/DL
MCHC RBC-ENTMCNC: 29.8 PG
MCHC RBC-ENTMCNC: 33.5 G/DL
MCV RBC AUTO: 88.8 FL
NONHDLC SERPL-MCNC: 44 MG/DL
PLATELET # BLD AUTO: 397 K/UL
PMV BLD: 10.2 FL
POTASSIUM SERPL-SCNC: 6.6 MMOL/L
PROT SERPL-MCNC: 6.2 G/DL
RBC # BLD: 2.05 M/UL
RBC # FLD: 19.6 %
SODIUM SERPL-SCNC: 133 MMOL/L
T4 SERPL-MCNC: 4.8 UG/DL
TRIGL SERPL-MCNC: 68 MG/DL
TSH SERPL-ACNC: 2.26 UIU/ML
WBC # FLD AUTO: 5.69 K/UL

## 2023-06-19 NOTE — HISTORY OF PRESENT ILLNESS
[de-identified] : She missed on appointment for procrit last week.\par She starts her next cycle on 6/25/18\par C/o back pain radiating down her left which occurred when she bent to  something.\par No change in diarrhea.\par \par 7/31/18:\par Doing well. On Revlimid for more than 2yrs, 5 mg 2 weeks on 1 week off. She will be starting week on tonight. \par C/o diarrhea. On Imodium 2 pills AM and 2 pills PM. Doesn't help much with diarrhea. \par C/o nausea, abdominal pain. \par \par \par Colonoscopy in 2016 was normal. \par Did not have blood transfusion for over 2 years. \par On procrit 10894lmbth weekly. Missed last week on 7/24/18 as she was out of town. She has been non compliant with weekly Procrit.\par Mammogram in 2017 was normal. \par \par 9/11/18\par pt is here for follow up.\par She feels the lomotil helps with the diarrhea.\par She was away for a few weeks and missed her procrit injections.\par No fever, abdominal  discomfort has been less since since use of lomotil.\par She started a new cycle 2 days ago.\par \par 10/2/18:\par She will start Revlimid tonight (week on). 2 weeks on, 1 week off. \par On ASA 81mg. \par Denies fever, nausea, vomiting, chest pain, SOB, abdominal pain, and bladder problems.\par C/o diarrhea. \par S/p 2 units PRBC transfusion on 9/25/18. \par On Procrit 60912 units weekly. Not compliant every week. \par C/o intermittent dizziness. \par \par 10/31/18\par Pt is here for follow up.\par C/O significant fatigue.\par Continues to have diarrhea, takes imodium and lomotil as needed.\par C/o dry cough, no fever.\par Cough started a month ago. It is worse in the mornings however over last week it has been constant all day.\par No chest pain.\par She was found to have low B12 levels and was started on B12 injections.\par \par 11/27/18:\par Doing well. Hospitalized for 3 days for cellulitis/abcess of the back s/p drainage and on Abx currently. Developed 3 days after Mg infusion as per pt. \par Denies nausea, vomiting, chest pain, SOB, abdominal pain, bowel and bladder problems.\par This is the week on Revlimid (week 1).\par S/p 1 unit PRBC transfusion in the hospital. \par On Procrit weekly \par \par 12/27/18:\par Doing well. No major complaints.\par Denies fever, nausea, vomiting, chest pain, SOB, abdominal pain, and bladder problems.\par On Revlimid 5 mg 2 weeks on 1 week off. This is the week off. She will start the week on sunday (12/30/18).\par Due for Procrit 95162 units today. \par Still has diarrhea. 4-5bm/day.\par On monthly B12 injections. \par \par 1/30/19:\par Patient here for follow up for MDS.  She is taking Revlimid 5 mg, 2 weeks on, 1 week off.  She will complete this current cycle on Saturday.  She has no new complaints today.  Patient denies cough, shortness of breath, denies fever, denies bone pain.\par \par 03/04/2019\par Patient is here for a follow up visit. She complaints of severe pain in her left shoulder and has had abscess drained 2 weeks ago. However the pain is worse and she has purulent discharge on examination. She also has Nasal sores that are painful. Culture from 11/2018 showed MRSA.  Denies Fever. \par She also complaints of swelling in her right leg. Not tender and not erythematous and negative Gong;s sign. \par Her diarrhea with Revlimid is ongoing and Imodium and Lomotil helps but not everyday. \par She is on 60,000 units of procrit weekly and Revlimid 5 mg 2 weeks on 1 week off. \par \par 4/23/19\par Pt is here for follow up.\par She feels well.\par The nasal sores have improved with bactroban\par The abscess on left shoulder has resolved.\par However she has a new one on the middle of her back.\par No fever.\par Pt has been on procrit 59241 units weekly, however she missed a dose in between.\par \par 5/8/19\par pt is here for follow up.\par no new complaints.\par feels well.\par Her skin abscess has resolved.\par she has been unable to go to ID, as she could not get an appt.\par No bleeding.\par She is compliant with revlimid.\par \par 6/6/19\par Pt is here for follow up.\par no new complaints \par Feels well.\par Is on week 2 of her Revlimid cycle. \par No transfusions since last visit\par \par 7/17/19\par Pt is here for follow up.\par C/O fatigue.\par Was away for a few days two weeks ago, but missed treatment with procrit for 2 weeks.\par Is complaint with Revlimid 2 weeks on 1 week off.\par Diarrhea is a little improved.\par \par 8/14/19\par Patient here for follow up visit, feeling well.  Although she is complaining of some pain at the left scapula area recently.  Patient denies cough, shortness of breath, denies fever, denies other bone pain.  She is off Revlimid  this week, due to restart on Monday.  She is scheduled for Procrit and Vitamin B12 injection today.  She plans to leave the country tomorrow to visit her mother who is ill.  She will return in about 10 days.\par \par 9/11/19\par Pt is here for follow up.\par She was away for 3 weeks, out of which she took procrit for 2 weeks in [place].\par She missed last week's dose.\par She had CBCs in [place] which showed Hgb of 8.9\par She feels well.\par She still getting diarrhea.\par She has been using lomotil with very minimal relief.\par She started a new cycle of Revlimid 4 days ago.\par \par 10/3/19\par Patient here for follow up visit, feeling fairly well.   Patient denies cough, shortness of breath, denies fever, denies other bone pain.  She remains on Revlimid.  She is scheduled for Procrit and Vitamin B12 injection today.\par \par 10/24/19\par Pt is here for follow up.\par Feels well.\par No SOB, fatigue.\par Compliant with procrit and Revl;imid.\par Remains on ASa.\par Diarrhea is unchanged.\par Pt takes imodium as needed.\par \par 11/14/19\par Pt is here for follow up.\par No new complaints.\par Starts a new cycle of Revlimid today.\par Diarrhea is same as before, no rectal bleeding.\par No fever.\par \par 12/5/19\par Pt is here for follow up.\par No new complaints.\par Denies feeling weaker than usual.\par No fever, SOB.\par No change in frequency of diarrhea.\par No pain.\par Will start next cycle of Revlimid in 3 days.\par \par \par !/16/20\par Pt was recently DCed from [facility] 3 days ago after being treated for pneumonia and MARCO.\par She is on po Levaquin.\par She restarted Revlmid 3 days ago.\par She is feeling better now. No fever.\par No SOB, Chest pain and back pain has resolved.\par Pt has a cough, no expectoration.\par She also recd a unit of PRBCs last week in the hospital\par \par \par 1/30/20\par Patient here for follow up visit, feeling well.  She has no new complaints. Cough is almost gone completely.  Patient denies shortness of breath, denies fever, denies bone pain.  Her appetite is ok, weight is stable.  She is due to restart Revlimid on Monday.\par \par 02/20/20\par Pt is here for follow up, feeling well.\par Offers no new complaints. Denies SOB, fever, chills, weight loss, CP, cough. \par Currently off week of Revlimid 5 mg. Restarts on Monday.\par Has procrit 80,000 units today. Next b 12 injection due in 2 weeks \par Did not get chest Xray\par CBC reviewed WBC 3.1, h/h 8.3/25%, plt 386, neutrophils 1.29\par \par 3/12/20\par Pt is here for follow up.\par She took Revlimid for 2 weeks and then has been off for one week although she was advised to take it daily without break.\par No SOB, Cough, fever.\par Has mild fatigue.\par \par 04/02/2020\par SIMONA KUHN a 75 year F is here today for follow up visit of MDS.\par Denies fever, chills, cough,night sweats, weight loss. \par Denies bleeding or bruising.\par Pt receives procrit 80,000 units weekly and B12 IM monthly. \par Continued Revlimid 5 mg daily x 3 weeks, has 1 dose left tonight. \par CBC reviewed: WBC 3.2, H/H 8.1/25.2, neutrophils 1.6, PLT 72\par \par 4/20/2020: The patient is here for follow up . She has no complaints of fever, chills, dizziness , chest pain and shortness of breath . She is still with diarrhea , up to 10 watery BMs per day, responds poorly to imodium and lomotil. She is on Revlimid 5 mg daily , took last dose yesterday night. Her last Procrit was on April 13. \par \par 5/26/20\par Pt is here for follow up.\par She is feeling well. Denies SOB, chest pain, fever.\par Continues to have diarrhea although she is off of Revlmid.\par Thinks it may due to diabetic meds.\par Wants to discuss BM bx results\par \par 6/22/20\par Pt is here for follow up.\par Feels well. Denies any fever, N, V, D\par Continues to have diarrhea, she will talk to her PCP about changing metformin for DM.\par Has been needing PRBCs 1 unit each month\par \par 7/20/20\par Pt is here for follow up.\par No new complaints. Has been feeling well.\par No SOB, CP. \par No N, V, D.\par She has recd 2 units of PRBCs since May 20.\par \par 8/17/20\par Pt is here for a follow up visit.\par She is feeling well.\par No new symptoms. No N, V, D.\par No bruising or bleeding. She continues to need PRBCs every 2-3 weeks.\par \par 8/31/20\par Pt is here for follow up. She is feeling well. No N, V, D.\par She is tolerating the hydrea well. No SOB, CP\par No bruising ior bleeding\par \par 9/8/20\par Pt is here for follow up.\par She had been contacted by the [facility] last week to advise her to stop the hydrea. Pt did not check her messages and continued with Hydrea.\par No fever, N, V, bleeding.\par Saw Dr Ferrell last week.\par \par 9/14/20\par Pt is here for folow up.\par Besides her usual complaint of diarrhea she is feeling well.\par Recd 1 unit PRBC last week.\par Has stopped Hydrea.\par No fever, mouth sores.\par \par 9/29/20\par Pt is here for folow up.\par No new complaints.\par Is seeing GI for diarrhea net week.\par No fever, night sweats.\par REcd 3 units of PRBC this month\par \par 10/13/20\par Pt is here for follow up.\par No new complaints.\par Has not needed a transfusion since 3 weeks ago.\par Continues to have diarrhea, waiting to be seen by GI\par \par 10/26/20\par Pt is here for follow up.\par C/O feeling fatigued.\par Has CLARK.\par No nausea or vomiting.\par No fever.\par Diarrhea has improved a little. She is no longer on Metformin\par \par 11/9/20\par Pt is here for follow up.\par Feels well. Denies SOB, CP, fatigue\par \par 12/7/20- Patient is for follow up and is due for cycle 9 of Vidaza.  She still has loose BM sometimes 10 times a day and was seeing Dr. Corey from GI- did not follow up recently and is unable to get an appt with him. She has lost about 10 lbs since September. No N/V. No fever or chills. No CP/SOB. She has been getting PRBC transfusion every 3 weeks now\par \par 01/04/20 Pt presented today for f/u visit . She is s/p 8 cycles of vidaza and was started on Luspatercept in Dec , 2020 . So far she received 1 injection , she is due for 2nd injection today . Adverse effect profile was discussed with her in detail , she reports having some dizziness and weakness after first injection . She received blood transfusion last wk . Blood work from today reviewed as well and counts are adequate . She denies any fevers,  chills,  or any new symptoms . \par \par 1/25/21\par Pt is here for follow up.\par C/O diarrhea, otherwise feeling better.\par Has not needed a transfusion since 12/29\par \par 2/16/21\par Pt is here for follow up.\par Needed transfusion on 2/8/21.\par Continues to C/o fatigue. Diarrhea remains the same, has not made GI appt as yet.\par \par 3/8/21\par Pt is here for follow up.\par Feels better today. Recd 1 unit PRBCs last week.\par Diarrhea is same as before. has an appt with GI next week.\par No fever\par \par 3/30/21\par Pt is here for follow up.\par Feels better. Last transfusion was on 3/2/21\par Saw GI and was started on cholestyramine and metformin was DCED with resolution of diarrhea.\par She denies any SOB,\par Fatigue is better\par \par 4/20/2021\par Pt is here to f/u for MDS\par She is s/p Luspatercept cycle #6\par She is compliant with Aspirin\par She feels better today, appetite is good\par She denies shortness of breath, fatigue, or weakness \par She c/o of diarrhea but it has improved since she was started on Cholestyramine. Stool is soft and less in frequency\par She received COVID vaccine x 2 doses\par \par 5/11/21\par pt is here for follow up.\par Feels the same with fatigue, diarrhea.\par No SOB\par \par 6/21/21\par Pt is here for follow up.\par Feels well. No SOB, CP, N< V.\par Diarrhea is better controlled now.\par Last PRBC transfusion was 2 weeks ago.\par \par 7/19/21\par Pt is here for follow up.\par Feels a little tired, last transfusion was 6/1/21.\par No bleeding, fever, SOB\par \par 8/10/2021\par Patient is here to follow up for her MDS.\par She is on Luspatercept, tolerating well.\par She feels well today, the appetite is good.\par She denies shortness of breath, fatigue, fever, night sweats, abdominal pain, arthralgias/myalgias.\par \par 8/31/21\par Pt is here for follow up.\par C/O feeling very fatigued.\par No bleeding.\par No fever.\par \par 9/21/2021\par Patient is here to follow up for MDS.\par She is on Luspatercept and Retacrit, tolerating well.\par She gets blood transfusion as needed for Hgb <7 gm/dL\par She is c/o of fatigue, no shortness of breath, dizziness, chills or lightheadedness.\par She denies melena or hematochezia.\par Her appetite is good, no nausea/vomiting.\par \par 10/28/2021\par Patient is here to follow up for MDS.\par She is on Luspatercept and Retacrit, tolerating well.\par She gets blood transfusion to keep Hgb > 7 gm/dL.\par She is c/o of chronic fatigue, no shortness of breath, dizziness, chills or lightheadedness.\par She denies melena or hematochezia.\par Her appetite is good, no nausea/vomiting.\par She c/o of severe diarrhea, 10-12x/day watery stool while on Imodium in the last 2 weeks.\par Last colonoscopy was in 2016, benign as per patient.\par \par 11/18/21\par Pt is here for follow up. C/O fatigue. No SOB, CP\par \par 12/9/21\par Pt is here for follow up.\par Feels better today. Recd 2 units last week in ER.\par Planning to go to [place] for over a week and does not want to miss her procrit dose.\par Diarrhea is stable,\par \par 12/23/21\par Pt is here for follow up.\par Feels better. No SOB, CP. Going to [place] tomorrow. Has not been able to get Procrit injection from the pharmacy yet d/t insurance issues\par \par 2/3/22\par Pt is here for follow up. Feeling same as usual. No SOB, CP. Last transfusion was 2 weeks ago in ER>\par No bleeding reported\par \par 3/3/22\par Pt is here for follow up.\par C/O fatigue. Had black stools X2\par \par 3/30/22\par Pt is feeling well.\par Here for treatment and BM biopsy\par \par 4/21/22\par Pt is here fir follow up.\par Was in ER last week. Was give 2 units of PRBCs.\par Pt feels less tired today.\par Wants to discuss BM rslts\par \par 5/12/22\par Pt is here today for follow up visit.\par Pt c/o feeling tired.  Hgb=6.9.  One unit transfusion scheduled.\par \par 6/23/22\par Pt is here for follow up. Feels well today. No SOB, chest pain.\par Has not made appt with [facility]\par \par 7/14/22\par Pt is here for follow up for lowrisk myelodysplastic syndrome.\par She came in late today because she was not feeling well this morning- dizziness & weakness.\par She denies SOB, CP.\par \par 9/22/22\par Pt is here for follow up.\par C/O fatigue. was in the ER 3 weeks ago and recd 2 U PRBCS\par \par 11/10/22\par Patient is here to follow up for MDS/MPN.\par S/P 2 units pRBC last weekend, feeling much better now.\par She denies shortness of breath, chest pain or headache.\par \par 1/5/2023\par Patient is here to follow up for MDS/MPN, accompanied by spouse and daughter.\par She is feeling much better after she received blood transfusion on 12/29/22.\par She denies shortness of breath, chest pain, dizziness or headache.\par Pt fell on 12/28/22 when she slipped while getting down the stairs and landed on her right knee. Now c/o of severe right knee pain with limited ROM. She has not been evaluated since the incident happened. She is taking Aleeve with minimal relief.\par \par 1/19/2023\par Patient is here to follow up for MDS/MPN and treatment.\par She feel fine, has fatigue not worse than her baseline.\par She denies shortness of breath, chest pain, dizziness, headache or bleeding.\par She was evaluated at the ER on 1/5/2023 for her fall and imaging showed no fracture, except for mild joint effusion.\par \par 2/9/23\par Patient presents for follow up today. She complains of light-headedness. Her BP is elevated to 180/70 mmHg. She does not take any meds and says she is not diagnosed with HTN. She is due for Procrit & Luspatercept today. Reports her energy level is at baseline. No other complaints today.\par Her CMP showed serum glucose > 600 mg/dl, she was sent to ER with the transport.\par \par 3/2/32\par Patient here for follow up for MDS.\par She is scheduled for next luspatercept injection.\par She is status post port placement yesterday, so she has some discomfort at chest area from that.\par She feels somewhat weak, appetite is adequate, weight is stable.\par She is under the care of endocrinologist to try to get her DM under better control.\par She was recommended to take ASA 81 mg when she was discharged from the hospital.\par \par 3/23/23\par Patient here for follow up for MDS.\par She is scheduled for next Luspatercept injection.\par She is status post port placement\par She feels somewhat weak, appetite is adequate, weight is stable.\par She is under the care of endocrinologist to try to get her DM under better control.\par She was recommended to take ASA 81 mg when she was discharged from the hospital.\par \par 4/13/23\par Pt is here for follow up.\par Daughter is with her. Neuro eval has not been completed yet.\par No bleeding, SOB, pain\par recd 2 Units PRBCs 2 weeks ago\par \par 5/4/23\par Pt is here for follow up.\par Pt C/O not feeling well today. Denies pain. Has dizziness. Fasting bld sugar was over 350. She is not on insulin.\par Denies diarrhea, N, SOB, CP\par \par 5/25/23\par Pt is here for follow up, accompanied by formal caregiver.\par She feels well except for fatigue which is unchanged from baseline. She denies chest pain, shortness of breath, bleeding or decreased urinary output.\par She states that she saw nephro last week and follows up with her PCP for her diabetes.\par \par 6/15/23\par Pt is here for follow up. Feels weak, No SOB, CP [de-identified] : \par

## 2023-06-19 NOTE — ASSESSMENT
[FreeTextEntry1] : Patient is a 78 year old female with Lowrisk myelodysplastic syndrome, previously treated with Revlimid and Procrit and Vidaza. 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 Pt is needing PRBCs almost every 3 weeks. Advised to follow up with Dr Ferrell to consider any other option or clinical trial. Called Dr Ferrell's office and discussed case with her. No clinical trial available, Rec PRBCS.\par \par S/P port placement on 3/1/23.\par \par PLAN:\par Previous notes reviewed including all relevant laboratory results and were communicated to the patient. \par \par -- Continue Luspatercept 1.75 mg/kg every 3 weeks, due today.  CBC today reviewed, \par \par Will transfuse 1 u PRBC. offered to send her to ER today, but pt declined\par SE discussed including: hypertension, abdominal pain, diarrhea, nausea, increased serum alanine aminotransferase, increased serum aspartate aminotransferase, increased serum bilirubin, dizziness, fatigue, headache, vertigo, arthralgia, musculoskeletal pain, ostealgia, decreased creatinine clearance, cough, dyspnea\par \par The therapy dose was adjusted according to pt's  labs and anticipated tolerance.\par The high risk of complications and complexity associated with this  therapy administration has been explained to the patient and her daughter and they were both agreeable.\par Treatment will be administered under my direct supervision.\par \par -- Hold Retacrit for now.\par -- Monitor CBC weekly and administer 1 pRBC unit  as needed when Hgb < 7 gm/dL.  Pt has e/o iron overload so transfusions need to be kept at a minimum if possible.  She is maintaining her requirement of PRBCs almost Q 3 weeks\par \par # Vitamin  B 12 deficiency\par -- Continue Vitamin B 12 injection every month, due today.\par \par # Thrombocytosis (controlled)\par -- previously on Hydrea.\par -- Will continue to monitor.\par \par \par \par RTC in 3 weeks\par \par

## 2023-06-19 NOTE — REVIEW OF SYSTEMS
[Fatigue] : fatigue [Negative] : Allergic/Immunologic [Recent Change In Weight] : ~T no recent weight change [Shortness Of Breath] : no shortness of breath [Dizziness] : no dizziness [FreeTextEntry2] : Unchanged fatigue. pt received semi estrella in bed in NAD, +PCA pump, +IV lock, +O2 via Nc 2L, +b/l LE sequentials removed prior to OOB and returned when pt return to bed, +BP cuff, +pulse oxi, agreeable to OT evaluation, left supine in bed as found, all lines intact, RN aware

## 2023-06-19 NOTE — PHYSICAL EXAM
[Capable of only limited self care, confined to bed or chair more than 50% of waking hours] : Status 3- Capable of only limited self care, confined to bed or chair more than 50% of waking hours [Normal] : affect appropriate [de-identified] : Ambulatory. [de-identified] : Lower extremity edema - LLE +1, RLE +2-3 [de-identified] : Right chest port-A-cath intact, no erythema or tenderness noted.

## 2023-06-20 DIAGNOSIS — D46.9 MYELODYSPLASTIC SYNDROME, UNSPECIFIED: ICD-10-CM

## 2023-06-20 DIAGNOSIS — Z51.11 ENCOUNTER FOR ANTINEOPLASTIC CHEMOTHERAPY: ICD-10-CM

## 2023-06-22 ENCOUNTER — NON-APPOINTMENT (OUTPATIENT)
Age: 78
End: 2023-06-22

## 2023-06-22 ENCOUNTER — OUTPATIENT (OUTPATIENT)
Dept: OUTPATIENT SERVICES | Facility: HOSPITAL | Age: 78
LOS: 1 days | End: 2023-06-22
Payer: MEDICARE

## 2023-06-22 ENCOUNTER — LABORATORY RESULT (OUTPATIENT)
Age: 78
End: 2023-06-22

## 2023-06-22 ENCOUNTER — APPOINTMENT (OUTPATIENT)
Dept: HEMATOLOGY ONCOLOGY | Facility: CLINIC | Age: 78
End: 2023-06-22

## 2023-06-22 DIAGNOSIS — Z98.890 OTHER SPECIFIED POSTPROCEDURAL STATES: Chronic | ICD-10-CM

## 2023-06-22 DIAGNOSIS — D75.839 THROMBOCYTOSIS, UNSPECIFIED: ICD-10-CM

## 2023-06-22 DIAGNOSIS — D46.9 MYELODYSPLASTIC SYNDROME, UNSPECIFIED: ICD-10-CM

## 2023-06-22 DIAGNOSIS — E87.5 HYPERKALEMIA: ICD-10-CM

## 2023-06-22 DIAGNOSIS — Z51.11 ENCOUNTER FOR ANTINEOPLASTIC CHEMOTHERAPY: ICD-10-CM

## 2023-06-22 DIAGNOSIS — D64.9 ANEMIA, UNSPECIFIED: ICD-10-CM

## 2023-06-22 PROCEDURE — 85027 COMPLETE CBC AUTOMATED: CPT

## 2023-06-22 PROCEDURE — 80053 COMPREHEN METABOLIC PANEL: CPT

## 2023-06-23 ENCOUNTER — APPOINTMENT (OUTPATIENT)
Dept: INFUSION THERAPY | Facility: CLINIC | Age: 78
End: 2023-06-23

## 2023-06-23 DIAGNOSIS — D75.839 THROMBOCYTOSIS, UNSPECIFIED: ICD-10-CM

## 2023-06-23 DIAGNOSIS — Z51.11 ENCOUNTER FOR ANTINEOPLASTIC CHEMOTHERAPY: ICD-10-CM

## 2023-06-23 DIAGNOSIS — D64.9 ANEMIA, UNSPECIFIED: ICD-10-CM

## 2023-06-26 LAB
ALBUMIN SERPL ELPH-MCNC: 4.5 G/DL
ALP BLD-CCNC: 198 U/L
ALT SERPL-CCNC: 22 U/L
ANION GAP SERPL CALC-SCNC: 16 MMOL/L
AST SERPL-CCNC: 16 U/L
BILIRUB SERPL-MCNC: 0.6 MG/DL
BUN SERPL-MCNC: 37 MG/DL
CALCIUM SERPL-MCNC: 9.4 MG/DL
CHLORIDE SERPL-SCNC: 102 MMOL/L
CO2 SERPL-SCNC: 19 MMOL/L
CREAT SERPL-MCNC: 1.3 MG/DL
EGFR: 42 ML/MIN/1.73M2
GLUCOSE SERPL-MCNC: 351 MG/DL
HCT VFR BLD CALC: 24 %
HGB BLD-MCNC: 7.6 G/DL
MCHC RBC-ENTMCNC: 28.9 PG
MCHC RBC-ENTMCNC: 31.7 G/DL
MCV RBC AUTO: 91.3 FL
PLATELET # BLD AUTO: 300 K/UL
PMV BLD: 10.1 FL
POTASSIUM SERPL-SCNC: 5.1 MMOL/L
PROT SERPL-MCNC: 6.3 G/DL
RBC # BLD: 2.63 M/UL
RBC # FLD: 17.5 %
SODIUM SERPL-SCNC: 137 MMOL/L
WBC # FLD AUTO: 6.2 K/UL

## 2023-06-29 ENCOUNTER — OUTPATIENT (OUTPATIENT)
Dept: OUTPATIENT SERVICES | Facility: HOSPITAL | Age: 78
LOS: 1 days | End: 2023-06-29
Payer: MEDICARE

## 2023-06-29 ENCOUNTER — LABORATORY RESULT (OUTPATIENT)
Age: 78
End: 2023-06-29

## 2023-06-29 ENCOUNTER — NON-APPOINTMENT (OUTPATIENT)
Age: 78
End: 2023-06-29

## 2023-06-29 ENCOUNTER — APPOINTMENT (OUTPATIENT)
Dept: HEMATOLOGY ONCOLOGY | Facility: CLINIC | Age: 78
End: 2023-06-29
Payer: MEDICARE

## 2023-06-29 DIAGNOSIS — D75.839 THROMBOCYTOSIS, UNSPECIFIED: ICD-10-CM

## 2023-06-29 DIAGNOSIS — Z51.11 ENCOUNTER FOR ANTINEOPLASTIC CHEMOTHERAPY: ICD-10-CM

## 2023-06-29 DIAGNOSIS — Z98.890 OTHER SPECIFIED POSTPROCEDURAL STATES: Chronic | ICD-10-CM

## 2023-06-29 DIAGNOSIS — E87.5 HYPERKALEMIA: ICD-10-CM

## 2023-06-29 DIAGNOSIS — D64.9 ANEMIA, UNSPECIFIED: ICD-10-CM

## 2023-06-29 DIAGNOSIS — D46.9 MYELODYSPLASTIC SYNDROME, UNSPECIFIED: ICD-10-CM

## 2023-06-29 LAB
HCT VFR BLD CALC: 20.8 %
HGB BLD-MCNC: 6.8 G/DL
MCHC RBC-ENTMCNC: 29.7 PG
MCHC RBC-ENTMCNC: 32.7 G/DL
MCV RBC AUTO: 90.8 FL
PLATELET # BLD AUTO: 369 K/UL
PMV BLD: 11 FL
RBC # BLD: 2.29 M/UL
RBC # FLD: 17.8 %
WBC # FLD AUTO: 6.12 K/UL

## 2023-06-29 PROCEDURE — 36415 COLL VENOUS BLD VENIPUNCTURE: CPT

## 2023-06-29 PROCEDURE — 99213 OFFICE O/P EST LOW 20 MIN: CPT

## 2023-06-29 PROCEDURE — 86850 RBC ANTIBODY SCREEN: CPT

## 2023-06-29 PROCEDURE — 85027 COMPLETE CBC AUTOMATED: CPT

## 2023-06-29 PROCEDURE — 86901 BLOOD TYPING SEROLOGIC RH(D): CPT

## 2023-06-29 PROCEDURE — 86923 COMPATIBILITY TEST ELECTRIC: CPT

## 2023-06-29 PROCEDURE — 86900 BLOOD TYPING SEROLOGIC ABO: CPT

## 2023-06-30 ENCOUNTER — APPOINTMENT (OUTPATIENT)
Dept: INFUSION THERAPY | Facility: CLINIC | Age: 78
End: 2023-06-30

## 2023-06-30 ENCOUNTER — OUTPATIENT (OUTPATIENT)
Dept: OUTPATIENT SERVICES | Facility: HOSPITAL | Age: 78
LOS: 1 days | End: 2023-06-30
Payer: MEDICARE

## 2023-06-30 DIAGNOSIS — Z51.11 ENCOUNTER FOR ANTINEOPLASTIC CHEMOTHERAPY: ICD-10-CM

## 2023-06-30 DIAGNOSIS — D46.9 MYELODYSPLASTIC SYNDROME, UNSPECIFIED: ICD-10-CM

## 2023-06-30 DIAGNOSIS — E87.5 HYPERKALEMIA: ICD-10-CM

## 2023-06-30 DIAGNOSIS — Z98.890 OTHER SPECIFIED POSTPROCEDURAL STATES: Chronic | ICD-10-CM

## 2023-06-30 DIAGNOSIS — D64.9 ANEMIA, UNSPECIFIED: ICD-10-CM

## 2023-06-30 DIAGNOSIS — D75.839 THROMBOCYTOSIS, UNSPECIFIED: ICD-10-CM

## 2023-06-30 LAB
ABO + RH PNL BLD: NORMAL
BLD GP AB SCN SERPL QL: NORMAL

## 2023-06-30 PROCEDURE — P9040: CPT

## 2023-06-30 PROCEDURE — 36430 TRANSFUSION BLD/BLD COMPNT: CPT

## 2023-07-05 ENCOUNTER — NON-APPOINTMENT (OUTPATIENT)
Age: 78
End: 2023-07-05

## 2023-07-06 ENCOUNTER — APPOINTMENT (OUTPATIENT)
Dept: INFUSION THERAPY | Facility: CLINIC | Age: 78
End: 2023-07-06

## 2023-07-06 ENCOUNTER — APPOINTMENT (OUTPATIENT)
Dept: HEMATOLOGY ONCOLOGY | Facility: CLINIC | Age: 78
End: 2023-07-06

## 2023-07-07 ENCOUNTER — APPOINTMENT (OUTPATIENT)
Dept: INFUSION THERAPY | Facility: CLINIC | Age: 78
End: 2023-07-07

## 2023-07-12 ENCOUNTER — NON-APPOINTMENT (OUTPATIENT)
Age: 78
End: 2023-07-12

## 2023-07-13 ENCOUNTER — LABORATORY RESULT (OUTPATIENT)
Age: 78
End: 2023-07-13

## 2023-07-13 ENCOUNTER — OUTPATIENT (OUTPATIENT)
Dept: OUTPATIENT SERVICES | Facility: HOSPITAL | Age: 78
LOS: 1 days | End: 2023-07-13
Payer: MEDICARE

## 2023-07-13 ENCOUNTER — APPOINTMENT (OUTPATIENT)
Dept: HEMATOLOGY ONCOLOGY | Facility: CLINIC | Age: 78
End: 2023-07-13
Payer: MEDICARE

## 2023-07-13 DIAGNOSIS — D64.9 ANEMIA, UNSPECIFIED: ICD-10-CM

## 2023-07-13 PROCEDURE — 86901 BLOOD TYPING SEROLOGIC RH(D): CPT

## 2023-07-13 PROCEDURE — 86923 COMPATIBILITY TEST ELECTRIC: CPT

## 2023-07-13 PROCEDURE — 99212 OFFICE O/P EST SF 10 MIN: CPT

## 2023-07-13 PROCEDURE — 85027 COMPLETE CBC AUTOMATED: CPT

## 2023-07-13 PROCEDURE — 86850 RBC ANTIBODY SCREEN: CPT

## 2023-07-13 PROCEDURE — 86900 BLOOD TYPING SEROLOGIC ABO: CPT

## 2023-07-14 ENCOUNTER — APPOINTMENT (OUTPATIENT)
Dept: HEMATOLOGY ONCOLOGY | Facility: CLINIC | Age: 78
End: 2023-07-14
Payer: MEDICARE

## 2023-07-14 ENCOUNTER — OUTPATIENT (OUTPATIENT)
Dept: OUTPATIENT SERVICES | Facility: HOSPITAL | Age: 78
LOS: 1 days | End: 2023-07-14
Payer: MEDICARE

## 2023-07-14 ENCOUNTER — APPOINTMENT (OUTPATIENT)
Dept: INFUSION THERAPY | Facility: CLINIC | Age: 78
End: 2023-07-14

## 2023-07-14 DIAGNOSIS — D64.9 ANEMIA, UNSPECIFIED: ICD-10-CM

## 2023-07-14 DIAGNOSIS — Z98.890 OTHER SPECIFIED POSTPROCEDURAL STATES: Chronic | ICD-10-CM

## 2023-07-14 PROCEDURE — 99214 OFFICE O/P EST MOD 30 MIN: CPT

## 2023-07-14 PROCEDURE — 96401 CHEMO ANTI-NEOPL SQ/IM: CPT

## 2023-07-14 PROCEDURE — 96372 THER/PROPH/DIAG INJ SC/IM: CPT

## 2023-07-14 RX ORDER — LUSPATERCEPT 75 MG/1
110 INJECTION, POWDER, LYOPHILIZED, FOR SOLUTION SUBCUTANEOUS ONCE
Refills: 0 | Status: COMPLETED | OUTPATIENT
Start: 2023-07-14 | End: 2023-07-14

## 2023-07-14 RX ORDER — PREGABALIN 225 MG/1
1000 CAPSULE ORAL ONCE
Refills: 0 | Status: COMPLETED | OUTPATIENT
Start: 2023-07-14 | End: 2023-07-14

## 2023-07-14 RX ADMIN — LUSPATERCEPT 110 MILLIGRAM(S): 75 INJECTION, POWDER, LYOPHILIZED, FOR SOLUTION SUBCUTANEOUS at 13:47

## 2023-07-14 RX ADMIN — PREGABALIN 1000 MICROGRAM(S): 225 CAPSULE ORAL at 13:46

## 2023-07-14 NOTE — HISTORY OF PRESENT ILLNESS
[de-identified] : She missed on appointment for procrit last week.\par She starts her next cycle on 6/25/18\par C/o back pain radiating down her left which occurred when she bent to  something.\par No change in diarrhea.\par \par 7/31/18:\par Doing well. On Revlimid for more than 2yrs, 5 mg 2 weeks on 1 week off. She will be starting week on tonight. \par C/o diarrhea. On Imodium 2 pills AM and 2 pills PM. Doesn't help much with diarrhea. \par C/o nausea, abdominal pain. \par \par \par Colonoscopy in 2016 was normal. \par Did not have blood transfusion for over 2 years. \par On procrit [id number] weekly. Missed last week on 7/24/18 as she was out of town. She has been non compliant with weekly Procrit.\par Mammogram in 2017 was normal. \par \par 9/11/18\par pt is here for follow up.\par She feels the lomotil helps with the diarrhea.\par She was away for a few weeks and missed her procrit injections.\par No fever, abdominal  discomfort has been less since since use of lomotil.\par She started a new cycle 2 days ago.\par \par 10/2/18:\par She will start Revlimid tonight (week on). 2 weeks on, 1 week off. \par On ASA 81mg. \par Denies fever, nausea, vomiting, chest pain, SOB, abdominal pain, and bladder problems.\par C/o diarrhea. \par S/p 2 units PRBC transfusion on 9/25/18. \par On Procrit 13688 units weekly. Not compliant every week. \par C/o intermittent dizziness. \par \par 10/31/18\par Pt is here for follow up.\par C/O significant fatigue.\par Continues to have diarrhea, takes imodium and lomotil as needed.\par C/o dry cough, no fever.\par Cough started a month ago. It is worse in the mornings however over last week it has been constant all day.\par No chest pain.\par She was found to have low B12 levels and was started on B12 injections.\par \par 11/27/18:\par Doing well. Hospitalized for 3 days for cellulitis/abcess of the back s/p drainage and on Abx currently. Developed 3 days after Mg infusion as per pt. \par Denies nausea, vomiting, chest pain, SOB, abdominal pain, bowel and bladder problems.\par This is the week on Revlimid (week 1).\par S/p 1 unit PRBC transfusion in the hospital. \par On Procrit weekly \par \par 12/27/18:\par Doing well. No major complaints.\par Denies fever, nausea, vomiting, chest pain, SOB, abdominal pain, and bladder problems.\par On Revlimid 5 mg 2 weeks on 1 week off. This is the week off. She will start the week on sunday (12/30/18).\par Due for Procrit 03006 units today. \par Still has diarrhea. 4-5bm/day.\par On monthly B12 injections. \par \par 1/30/19:\par Patient here for follow up for MDS.  She is taking Revlimid 5 mg, 2 weeks on, 1 week off.  She will complete this current cycle on Saturday.  She has no new complaints today.  Patient denies cough, shortness of breath, denies fever, denies bone pain.\par \par 03/04/2019\par Patient is here for a follow up visit. She complaints of severe pain in her left shoulder and has had abscess drained 2 weeks ago. However the pain is worse and she has purulent discharge on examination. She also has Nasal sores that are painful. Culture from 11/2018 showed MRSA.  Denies Fever. \par She also complaints of swelling in her right leg. Not tender and not erythematous and negative Gong;s sign. \par Her diarrhea with Revlimid is ongoing and Imodium and Lomotil helps but not everyday. \par She is on 60,000 units of procrit weekly and Revlimid 5 mg 2 weeks on 1 week off. \par \par 4/23/19\par Pt is here for follow up.\par She feels well.\par The nasal sores have improved with bactroban\par The abscess on left shoulder has resolved.\par However she has a new one on the middle of her back.\par No fever.\par Pt has been on procrit 76024 units weekly, however she missed a dose in between.\par \par 5/8/19\par pt is here for follow up.\par no new complaints.\par feels well.\par Her skin abscess has resolved.\par she has been unable to go to ID, as she could not get an appt.\par No bleeding.\par She is compliant with revlimid.\par \par 6/6/19\par Pt is here for follow up.\par no new complaints \par Feels well.\par Is on week 2 of her Revlimid cycle. \par No transfusions since last visit\par \par 7/17/19\par Pt is here for follow up.\par C/O fatigue.\par Was away for a few days two weeks ago, but missed treatment with procrit for 2 weeks.\par Is complaint with Revlimid 2 weeks on 1 week off.\par Diarrhea is a little improved.\par \par 8/14/19\par Patient here for follow up visit, feeling well.  Although she is complaining of some pain at the left scapula area recently.  Patient denies cough, shortness of breath, denies fever, denies other bone pain.  She is off Revlimid  this week, due to restart on Monday.  She is scheduled for Procrit and Vitamin B12 injection today.  She plans to leave the country tomorrow to visit her mother who is ill.  She will return in about 10 days.\par \par 9/11/19\par Pt is here for follow up.\par She was away for 3 weeks, out of which she took procrit for 2 weeks in [place].\par She missed last week's dose.\par She had CBCs in [place] which showed Hgb of 8.9\par She feels well.\par She still getting diarrhea.\par She has been using lomotil with very minimal relief.\par She started a new cycle of Revlimid 4 days ago.\par \par 10/3/19\par Patient here for follow up visit, feeling fairly well.   Patient denies cough, shortness of breath, denies fever, denies other bone pain.  She remains on Revlimid.  She is scheduled for Procrit and Vitamin B12 injection today.\par \par 10/24/19\par Pt is here for follow up.\par Feels well.\par No SOB, fatigue.\par Compliant with procrit and Revl;imid.\par Remains on ASa.\par Diarrhea is unchanged.\par Pt takes imodium as needed.\par \par 11/14/19\par Pt is here for follow up.\par No new complaints.\par Starts a new cycle of Revlimid today.\par Diarrhea is same as before, no rectal bleeding.\par No fever.\par \par 12/5/19\par Pt is here for follow up.\par No new complaints.\par Denies feeling weaker than usual.\par No fever, SOB.\par No change in frequency of diarrhea.\par No pain.\par Will start next cycle of Revlimid in 3 days.\par \par \par !/16/20\par Pt was recently DC[facility] 3 days ago after being treated for pneumonia and MARCO.\par She is on po Levaquin.\par She restarted Revlmid 3 days ago.\par She is feeling better now. No fever.\par No SOB, Chest pain and back pain has resolved.\par Pt has a cough, no expectoration.\par She also recd a unit of PRBCs last week in the hospital\par \par \par 1/30/20\par Patient here for follow up visit, feeling well.  She has no new complaints. Cough is almost gone completely.  Patient denies shortness of breath, denies fever, denies bone pain.  Her appetite is ok, weight is stable.  She is due to restart Revlimid on Monday.\par \par 02/20/20\par Pt is here for follow up, feeling well.\par Offers no new complaints. Denies SOB, fever, chills, weight loss, CP, cough. \par Currently off week of Revlimid 5 mg. Restarts on Monday.\par Has procrit 80,000 units today. Next b 12 injection due in 2 weeks \par Did not get chest Xray\par CBC reviewed WBC 3.1, h/h 8.3/25%, plt 386, neutrophils 1.29\par \par 3/12/20\par Pt is here for follow up.\par She took Revlimid for 2 weeks and then has been off for one week although she was advised to take it daily without break.\par No SOB, Cough, fever.\par Has mild fatigue.\par \par 04/02/2020\par SIMONA KUHN a 75 year F is here today for follow up visit of MDS.\par Denies fever, chills, cough,night sweats, weight loss. \par Denies bleeding or bruising.\par Pt receives procrit 80,000 units weekly and B12 IM monthly. \par Continued Revlimid 5 mg daily x 3 weeks, has 1 dose left tonight. \par CBC reviewed: WBC 3.2, H/H 8.1/25.2, neutrophils 1.6, PLT 72\par \par 4/20/2020: The patient is here for follow up . She has no complaints of fever, chills, dizziness , chest pain and shortness of breath . She is still with diarrhea , up to 10 watery BMs per day, responds poorly to imodium and lomotil. She is on Revlimid 5 mg daily , took last dose yesterday night. Her last Procrit was on April 13. \par \par 5/26/20\par Pt is here for follow up.\par She is feeling well. Denies SOB, chest pain, fever.\par Continues to have diarrhea although she is off of Revlmid.\par Thinks it may due to diabetic meds.\par Wants to discuss BM bx results\par \par 6/22/20\par Pt is here for follow up.\par Feels well. Denies any fever, N, V, D\par Continues to have diarrhea, she will talk to her PCP about changing metformin for DM.\par Has been needing PRBCs 1 unit each month\par \par 7/20/20\par Pt is here for follow up.\par No new complaints. Has been feeling well.\par No SOB, CP. \par No N, V, D.\par She has recd 2 units of PRBCs since May 20.\par \par 8/17/20\par Pt is here for a follow up visit.\par She is feeling well.\par No new symptoms. No N, V, D.\par No bruising or bleeding. She continues to need PRBCs every 2-3 weeks.\par \par 8/31/20\par Pt is here for follow up. She is feeling well. No N, V, D.\par She is tolerating the hydrea well. No SOB, CP\par No bruising ior bleeding\par \par 9/8/20\par Pt is here for follow up.\par She had been contacted by the [facility] last week to advise her to stop the hydrea. Pt did not check her messages and continued with Hydrea.\par No fever, N, V, bleeding.\par Saw Dr Ferrell last week.\par \par 9/14/20\par Pt is here for folow up.\par Besides her usual complaint of diarrhea she is feeling well.\par Recd 1 unit PRBC last week.\par Has stopped Hydrea.\par No fever, mouth sores.\par \par 9/29/20\par Pt is here for folow up.\par No new complaints.\par Is seeing GI for diarrhea net week.\par No fever, night sweats.\par REcd 3 units of PRBC this month\par \par 10/13/20\par Pt is here for follow up.\par No new complaints.\par Has not needed a transfusion since 3 weeks ago.\par Continues to have diarrhea, waiting to be seen by GI\par \par 10/26/20\par Pt is here for follow up.\par C/O feeling fatigued.\par Has CLARK.\par No nausea or vomiting.\par No fever.\par Diarrhea has improved a little. She is no longer on Metformin\par \par 11/9/20\par Pt is here for follow up.\par Feels well. Denies SOB, CP, fatigue\par \par 12/7/20- Patient is for follow up and is due for cycle 9 of Vidaza.  She still has loose BM sometimes 10 times a day and was seeing Dr. Corey from GI- did not follow up recently and is unable to get an appt with him. She has lost about 10 lbs since September. No N/V. No fever or chills. No CP/SOB. She has been getting PRBC transfusion every 3 weeks now\par \par 01/04/20 Pt presented today for f/u visit . She is s/p 8 cycles of vidaza and was started on Luspatercept in Dec , 2020 . So far she received 1 injection , she is due for 2nd injection today . Adverse effect profile was discussed with her in detail , she reports having some dizziness and weakness after first injection . She received blood transfusion last wk . Blood work from today reviewed as well and counts are adequate . She denies any fevers,  chills,  or any new symptoms . \par \par 1/25/21\par Pt is here for follow up.\par C/O diarrhea, otherwise feeling better.\par Has not needed a transfusion since 12/29\par \par 2/16/21\par Pt is here for follow up.\par Needed transfusion on 2/8/21.\par Continues to C/o fatigue. Diarrhea remains the same, has not made GI appt as yet.\par \par 3/8/21\par Pt is here for follow up.\par Feels better today. Recd 1 unit PRBCs last week.\par Diarrhea is same as before. has an appt with GI next week.\par No fever\par \par 3/30/21\par Pt is here for follow up.\par Feels better. Last transfusion was on 3/2/21\par Saw GI and was started on cholestyramine and metformin was DCED with resolution of diarrhea.\par She denies any SOB,\par Fatigue is better\par \par 4/20/2021\par Pt is here to f/u for MDS\par She is s/p Luspatercept cycle #6\par She is compliant with Aspirin\par She feels better today, appetite is good\par She denies shortness of breath, fatigue, or weakness \par She c/o of diarrhea but it has improved since she was started on Cholestyramine. Stool is soft and less in frequency\par She received COVID vaccine x 2 doses\par \par 5/11/21\par pt is here for follow up.\par Feels the same with fatigue, diarrhea.\par No SOB\par \par 6/21/21\par Pt is here for follow up.\par Feels well. No SOB, CP, N< V.\par Diarrhea is better controlled now.\par Last PRBC transfusion was 2 weeks ago.\par \par 7/19/21\par Pt is here for follow up.\par Feels a little tired, last transfusion was 6/1/21.\par No bleeding, fever, SOB\par \par 8/10/2021\par Patient is here to follow up for her MDS.\par She is on Luspatercept, tolerating well.\par She feels well today, the appetite is good.\par She denies shortness of breath, fatigue, fever, night sweats, abdominal pain, arthralgias/myalgias.\par \par 8/31/21\par Pt is here for follow up.\par C/O feeling very fatigued.\par No bleeding.\par No fever.\par \par 9/21/2021\par Patient is here to follow up for MDS.\par She is on Luspatercept and Retacrit, tolerating well.\par She gets blood transfusion as needed for Hgb <7 gm/dL\par She is c/o of fatigue, no shortness of breath, dizziness, chills or lightheadedness.\par She denies melena or hematochezia.\par Her appetite is good, no nausea/vomiting.\par \par 10/28/2021\par Patient is here to follow up for MDS.\par She is on Luspatercept and Retacrit, tolerating well.\par She gets blood transfusion to keep Hgb > 7 gm/dL.\par She is c/o of chronic fatigue, no shortness of breath, dizziness, chills or lightheadedness.\par She denies melena or hematochezia.\par Her appetite is good, no nausea/vomiting.\par She c/o of severe diarrhea, 10-12x/day watery stool while on Imodium in the last 2 weeks.\par Last colonoscopy was in 2016, benign as per patient.\par \par 11/18/21\par Pt is here for follow up. C/O fatigue. No SOB, CP\par \par 12/9/21\par Pt is here for follow up.\par Feels better today. Recd 2 units last week in ER.\par Planning to go to [place] for over a week and does not want to miss her procrit dose.\par Diarrhea is stable,\par \par 12/23/21\par Pt is here for follow up.\par Feels better. No SOB, CP. Going to [place] tomorrow. Has not been able to get Procrit injection from the pharmacy yet d/t insurance issues\par \par 2/3/22\par Pt is here for follow up. Feeling same as usual. No SOB, CP. Last transfusion was 2 weeks ago in ER>\par No bleeding reported\par \par 3/3/22\par Pt is here for follow up.\par C/O fatigue. Had black stools X2\par \par 3/30/22\par Pt is feeling well.\par Here for treatment and BM biopsy\par \par 4/21/22\par Pt is here fir follow up.\par Was in ER last week. Was give 2 units of PRBCs.\par Pt feels less tired today.\par Wants to discuss BM rslts\par \par 5/12/22\par Pt is here today for follow up visit.\par Pt c/o feeling tired.  Hgb=6.9.  One unit transfusion scheduled.\par \par 6/23/22\par Pt is here for follow up. Feels well today. No SOB, chest pain.\par Has not made appt with [facility]\par \par 7/14/22\par Pt is here for follow up for lowrisk myelodysplastic syndrome.\par She came in late today because she was not feeling well this morning- dizziness & weakness.\par She denies SOB, CP.\par \par 9/22/22\par Pt is here for follow up.\par C/O fatigue. was in the ER 3 weeks ago and recd 2 U PRBCS\par \par 11/10/22\par Patient is here to follow up for MDS/MPN.\par S/P 2 units pRBC last weekend, feeling much better now.\par She denies shortness of breath, chest pain or headache.\par \par 1/5/2023\par Patient is here to follow up for MDS/MPN, accompanied by spouse and daughter.\par She is feeling much better after she received blood transfusion on 12/29/22.\par She denies shortness of breath, chest pain, dizziness or headache.\par Pt fell on 12/28/22 when she slipped while getting down the stairs and landed on her right knee. Now c/o of severe right knee pain with limited ROM. She has not been evaluated since the incident happened. She is taking Aleeve with minimal relief.\par \par 1/19/2023\par Patient is here to follow up for MDS/MPN and treatment.\par She feel fine, has fatigue not worse than her baseline.\par She denies shortness of breath, chest pain, dizziness, headache or bleeding.\par She was evaluated at the ER on 1/5/2023 for her fall and imaging showed no fracture, except for mild joint effusion.\par \par 2/9/23\par Patient presents for follow up today. She complains of light-headedness. Her BP is elevated to 180/70 mmHg. She does not take any meds and says she is not diagnosed with HTN. She is due for Procrit & Luspatercept today. Reports her energy level is at baseline. No other complaints today.\par Her CMP showed serum glucose > 600 mg/dl, she was sent to ER with the transport.\par \par 3/2/32\par Patient here for follow up for MDS.\par She is scheduled for next luspatercept injection.\par She is status post port placement yesterday, so she has some discomfort at chest area from that.\par She feels somewhat weak, appetite is adequate, weight is stable.\par She is under the care of endocrinologist to try to get her DM under better control.\par She was recommended to take ASA 81 mg when she was discharged from the hospital.\par \par 3/23/23\par Patient here for follow up for MDS.\par She is scheduled for next Luspatercept injection.\par She is status post port placement\par She feels somewhat weak, appetite is adequate, weight is stable.\par She is under the care of endocrinologist to try to get her DM under better control.\par She was recommended to take ASA 81 mg when she was discharged from the hospital.\par \par 4/13/23\par Pt is here for follow up.\par Daughter is with her. Neuro eval has not been completed yet.\par No bleeding, SOB, pain\par recd 2 Units PRBCs 2 weeks ago\par \par 5/4/23\par Pt is here for follow up.\par Pt C/O not feeling well today. Denies pain. Has dizziness. Fasting bld sugar was over 350. She is not on insulin.\par Denies diarrhea, N, SOB, CP\par \par 5/25/23\par Pt is here for follow up, accompanied by formal caregiver.\par She feels well except for fatigue which is unchanged from baseline. She denies chest pain, shortness of breath, bleeding or decreased urinary output.\par She states that she saw nephro last week and follows up with her PCP for her diabetes.\par \par 6/15/23\par Pt is here for follow up. Feels weak, No SOB, CP\par \par 7/14/23\par Patient is here to follow up accompanied by formal caregiver.\par She had missed her appt last week when she was due to get Luspatercept. \par She had received 2 pRBC infusion in the last month.\par She feels well except for chronic fatigue, denies shortness of breath, chest pain or headache. [de-identified] : \par

## 2023-07-14 NOTE — ASSESSMENT
[FreeTextEntry1] : Patient is a 78 year old female with Lowrisk myelodysplastic syndrome, previously treated with Revlimid and Procrit and Vidaza. 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 Pt is needing PRBCs almost every 3 weeks. Advised to follow up with Dr Ferrell to consider any other option or clinical trial. Called Dr Ferrell's office and discussed case with her. No clinical trial available, Rec PRBCS.\par \par S/P port placement on 3/1/23.\par \par PLAN:\par Previous notes reviewed including all relevant laboratory results and were communicated to the patient. \par \par -- Continue Luspatercept 1.75 mg/kg every 3 weeks, due today.  CBC today reviewed, severe anemia noted\par SE discussed including: hypertension, abdominal pain, diarrhea, nausea, increased serum alanine aminotransferase, increased serum aspartate aminotransferase, increased serum bilirubin, dizziness, fatigue, headache, vertigo, arthralgia, musculoskeletal pain, ostealgia, decreased creatinine clearance, cough, dyspnea\par \par The therapy dose was adjusted according to pt's  labs and anticipated tolerance.\par The high risk of complications and complexity associated with this  therapy administration has been explained to the patient and her daughter and they were both agreeable.\par Treatment will be administered under my direct supervision.\par \par -- Will transfuse 1 unit pRBC today.\par -- Hold Retacrit for now.\par -- Monitor CBC weekly and administer 1 unit  pRBC as needed when Hgb < 7 gm/dL.  Pt has e/o iron overload so transfusions need to be kept at a minimum if possible.  She is maintaining her requirement of pRBCs almost Q 3 weeks.\par \par # Vitamin  B 12 deficiency\par -- Continue Vitamin B 12 injection every month, due today.\par \par # Thrombocytosis (controlled)\par -- previously on Hydrea.\par -- Will continue to monitor.\par \par RTC in 3 weeks\par \par

## 2023-07-14 NOTE — PHYSICAL EXAM
[Capable of only limited self care, confined to bed or chair more than 50% of waking hours] : Status 3- Capable of only limited self care, confined to bed or chair more than 50% of waking hours [Normal] : affect appropriate [de-identified] : Ambulatory. [de-identified] : Lower extremity edema, bilateral +1 [de-identified] : Right chest port-A-cath intact, no erythema or tenderness noted.

## 2023-07-17 LAB
ABO + RH PNL BLD: NORMAL
BLD GP AB SCN SERPL QL: NORMAL
HCT VFR BLD CALC: 20.4 %
HGB BLD-MCNC: 6.6 G/DL
MCHC RBC-ENTMCNC: 28.4 PG
MCHC RBC-ENTMCNC: 32.4 G/DL
MCV RBC AUTO: 87.9 FL
PLATELET # BLD AUTO: 409 K/UL
PMV BLD: 10.1 FL
RBC # BLD: 2.32 M/UL
RBC # FLD: 18 %
WBC # FLD AUTO: 5.71 K/UL

## 2023-07-18 DIAGNOSIS — D46.9 MYELODYSPLASTIC SYNDROME, UNSPECIFIED: ICD-10-CM

## 2023-07-19 ENCOUNTER — NON-APPOINTMENT (OUTPATIENT)
Age: 78
End: 2023-07-19

## 2023-07-19 DIAGNOSIS — D46.9 MYELODYSPLASTIC SYNDROME, UNSPECIFIED: ICD-10-CM

## 2023-07-20 ENCOUNTER — APPOINTMENT (OUTPATIENT)
Dept: HEMATOLOGY ONCOLOGY | Facility: CLINIC | Age: 78
End: 2023-07-20

## 2023-07-21 ENCOUNTER — LABORATORY RESULT (OUTPATIENT)
Age: 78
End: 2023-07-21

## 2023-07-21 ENCOUNTER — OUTPATIENT (OUTPATIENT)
Dept: OUTPATIENT SERVICES | Facility: HOSPITAL | Age: 78
LOS: 1 days | End: 2023-07-21
Payer: MEDICARE

## 2023-07-21 ENCOUNTER — APPOINTMENT (OUTPATIENT)
Dept: INFUSION THERAPY | Facility: CLINIC | Age: 78
End: 2023-07-21

## 2023-07-21 DIAGNOSIS — Z98.890 OTHER SPECIFIED POSTPROCEDURAL STATES: Chronic | ICD-10-CM

## 2023-07-21 DIAGNOSIS — D64.9 ANEMIA, UNSPECIFIED: ICD-10-CM

## 2023-07-21 DIAGNOSIS — D46.9 MYELODYSPLASTIC SYNDROME, UNSPECIFIED: ICD-10-CM

## 2023-07-21 LAB
HCT VFR BLD CALC: 22.7 %
HGB BLD-MCNC: 7.3 G/DL
MCHC RBC-ENTMCNC: 28.2 PG
MCHC RBC-ENTMCNC: 32.2 G/DL
MCV RBC AUTO: 87.6 FL
PLATELET # BLD AUTO: 353 K/UL
PMV BLD: 9.9 FL
RBC # BLD: 2.59 M/UL
RBC # FLD: 17.4 %
WBC # FLD AUTO: 7.31 K/UL

## 2023-07-21 PROCEDURE — 36415 COLL VENOUS BLD VENIPUNCTURE: CPT

## 2023-07-21 PROCEDURE — 85027 COMPLETE CBC AUTOMATED: CPT

## 2023-07-24 ENCOUNTER — NON-APPOINTMENT (OUTPATIENT)
Age: 78
End: 2023-07-24

## 2023-07-26 ENCOUNTER — NON-APPOINTMENT (OUTPATIENT)
Age: 78
End: 2023-07-26

## 2023-07-27 ENCOUNTER — APPOINTMENT (OUTPATIENT)
Dept: HEMATOLOGY ONCOLOGY | Facility: CLINIC | Age: 78
End: 2023-07-27
Payer: MEDICARE

## 2023-07-27 ENCOUNTER — LABORATORY RESULT (OUTPATIENT)
Age: 78
End: 2023-07-27

## 2023-07-27 ENCOUNTER — OUTPATIENT (OUTPATIENT)
Dept: OUTPATIENT SERVICES | Facility: HOSPITAL | Age: 78
LOS: 1 days | End: 2023-07-27
Payer: MEDICARE

## 2023-07-27 DIAGNOSIS — D64.9 ANEMIA, UNSPECIFIED: ICD-10-CM

## 2023-07-27 DIAGNOSIS — D46.9 MYELODYSPLASTIC SYNDROME, UNSPECIFIED: ICD-10-CM

## 2023-07-27 DIAGNOSIS — D69.6 THROMBOCYTOPENIA, UNSPECIFIED: ICD-10-CM

## 2023-07-27 DIAGNOSIS — Z98.890 OTHER SPECIFIED POSTPROCEDURAL STATES: Chronic | ICD-10-CM

## 2023-07-27 LAB
HCT VFR BLD CALC: 22.5 %
HGB BLD-MCNC: 7.3 G/DL
MCHC RBC-ENTMCNC: 28.4 PG
MCHC RBC-ENTMCNC: 32.4 G/DL
MCV RBC AUTO: 87.5 FL
PLATELET # BLD AUTO: 423 K/UL
PMV BLD: 9.9 FL
RBC # BLD: 2.57 M/UL
RBC # FLD: 18.3 %
WBC # FLD AUTO: 7.53 K/UL

## 2023-07-27 PROCEDURE — 99213 OFFICE O/P EST LOW 20 MIN: CPT

## 2023-07-27 PROCEDURE — 36416 COLLJ CAPILLARY BLOOD SPEC: CPT

## 2023-07-27 PROCEDURE — 85027 COMPLETE CBC AUTOMATED: CPT

## 2023-07-28 ENCOUNTER — NON-APPOINTMENT (OUTPATIENT)
Age: 78
End: 2023-07-28

## 2023-07-28 ENCOUNTER — APPOINTMENT (OUTPATIENT)
Dept: INFUSION THERAPY | Facility: CLINIC | Age: 78
End: 2023-07-28

## 2023-08-03 ENCOUNTER — OUTPATIENT (OUTPATIENT)
Dept: OUTPATIENT SERVICES | Facility: HOSPITAL | Age: 78
LOS: 1 days | End: 2023-08-03
Payer: MEDICARE

## 2023-08-03 ENCOUNTER — APPOINTMENT (OUTPATIENT)
Dept: INFUSION THERAPY | Facility: CLINIC | Age: 78
End: 2023-08-03
Payer: MEDICARE

## 2023-08-03 ENCOUNTER — APPOINTMENT (OUTPATIENT)
Dept: HEMATOLOGY ONCOLOGY | Facility: CLINIC | Age: 78
End: 2023-08-03
Payer: MEDICARE

## 2023-08-03 ENCOUNTER — NON-APPOINTMENT (OUTPATIENT)
Age: 78
End: 2023-08-03

## 2023-08-03 VITALS
TEMPERATURE: 98.8 F | HEART RATE: 91 BPM | BODY MASS INDEX: 23.92 KG/M2 | WEIGHT: 130 LBS | DIASTOLIC BLOOD PRESSURE: 66 MMHG | HEIGHT: 62 IN | SYSTOLIC BLOOD PRESSURE: 169 MMHG

## 2023-08-03 DIAGNOSIS — Z98.890 OTHER SPECIFIED POSTPROCEDURAL STATES: Chronic | ICD-10-CM

## 2023-08-03 DIAGNOSIS — E53.8 DEFICIENCY OF OTHER SPECIFIED B GROUP VITAMINS: ICD-10-CM

## 2023-08-03 PROCEDURE — 99214 OFFICE O/P EST MOD 30 MIN: CPT

## 2023-08-03 PROCEDURE — 36430 TRANSFUSION BLD/BLD COMPNT: CPT

## 2023-08-03 PROCEDURE — 36415 COLL VENOUS BLD VENIPUNCTURE: CPT

## 2023-08-03 PROCEDURE — P9040: CPT

## 2023-08-03 PROCEDURE — 80053 COMPREHEN METABOLIC PANEL: CPT

## 2023-08-03 PROCEDURE — 96401 CHEMO ANTI-NEOPL SQ/IM: CPT

## 2023-08-03 PROCEDURE — 83735 ASSAY OF MAGNESIUM: CPT

## 2023-08-03 RX ORDER — LUSPATERCEPT 75 MG/1
110 INJECTION, POWDER, LYOPHILIZED, FOR SOLUTION SUBCUTANEOUS ONCE
Refills: 0 | Status: COMPLETED | OUTPATIENT
Start: 2023-08-03 | End: 2023-08-03

## 2023-08-03 RX ADMIN — LUSPATERCEPT 110 MILLIGRAM(S): 75 INJECTION, POWDER, LYOPHILIZED, FOR SOLUTION SUBCUTANEOUS at 17:32

## 2023-08-04 ENCOUNTER — APPOINTMENT (OUTPATIENT)
Dept: INFUSION THERAPY | Facility: CLINIC | Age: 78
End: 2023-08-04

## 2023-08-04 DIAGNOSIS — E53.8 DEFICIENCY OF OTHER SPECIFIED B GROUP VITAMINS: ICD-10-CM

## 2023-08-04 LAB
ALBUMIN SERPL ELPH-MCNC: 4.1 G/DL
ALP BLD-CCNC: 147 U/L
ALT SERPL-CCNC: 26 U/L
ANION GAP SERPL CALC-SCNC: 11 MMOL/L
AST SERPL-CCNC: 24 U/L
BILIRUB SERPL-MCNC: 0.5 MG/DL
BUN SERPL-MCNC: 36 MG/DL
CALCIUM SERPL-MCNC: 8.9 MG/DL
CHLORIDE SERPL-SCNC: 100 MMOL/L
CO2 SERPL-SCNC: 19 MMOL/L
CREAT SERPL-MCNC: 1.1 MG/DL
EGFR: 52 ML/MIN/1.73M2
GLUCOSE SERPL-MCNC: 168 MG/DL
MAGNESIUM SERPL-MCNC: 1.6 MG/DL
POTASSIUM SERPL-SCNC: 5.1 MMOL/L
PROT SERPL-MCNC: 5.7 G/DL
SODIUM SERPL-SCNC: 130 MMOL/L

## 2023-08-04 NOTE — PHYSICAL EXAM
[Capable of only limited self care, confined to bed or chair more than 50% of waking hours] : Status 3- Capable of only limited self care, confined to bed or chair more than 50% of waking hours [Normal] : affect appropriate [de-identified] : Ambulatory. [de-identified] : Lower extremity edema - LLE +1, RLE +2-3 [de-identified] : Right chest port-A-cath intact, no erythema or tenderness noted.

## 2023-08-04 NOTE — HISTORY OF PRESENT ILLNESS
[de-identified] : She missed on appointment for procrit last week. She starts her next cycle on 6/25/18 C/o back pain radiating down her left which occurred when she bent to  something. No change in diarrhea.  7/31/18: Doing well. On Revlimid for more than 2yrs, 5 mg 2 weeks on 1 week off. She will be starting week on tonight.  C/o diarrhea. On Imodium 2 pills AM and 2 pills PM. Doesn't help much with diarrhea.  C/o nausea, abdominal pain.    Colonoscopy in 2016 was normal.  Did not have blood transfusion for over 2 years.  On procrit [id number] weekly. Missed last week on 7/24/18 as she was out of town. She has been non compliant with weekly Procrit. Mammogram in 2017 was normal.   9/11/18 pt is here for follow up. She feels the lomotil helps with the diarrhea. She was away for a few weeks and missed her procrit injections. No fever, abdominal  discomfort has been less since since use of lomotil. She started a new cycle 2 days ago.  10/2/18: She will start Revlimid tonight (week on). 2 weeks on, 1 week off.  On ASA 81mg.  Denies fever, nausea, vomiting, chest pain, SOB, abdominal pain, and bladder problems. C/o diarrhea.  S/p 2 units PRBC transfusion on 9/25/18.  On Procrit 57837 units weekly. Not compliant every week.  C/o intermittent dizziness.   10/31/18 Pt is here for follow up. C/O significant fatigue. Continues to have diarrhea, takes imodium and lomotil as needed. C/o dry cough, no fever. Cough started a month ago. It is worse in the mornings however over last week it has been constant all day. No chest pain. She was found to have low B12 levels and was started on B12 injections.  11/27/18: Doing well. Hospitalized for 3 days for cellulitis/abcess of the back s/p drainage and on Abx currently. Developed 3 days after Mg infusion as per pt.  Denies nausea, vomiting, chest pain, SOB, abdominal pain, bowel and bladder problems. This is the week on Revlimid (week 1). S/p 1 unit PRBC transfusion in the hospital.  On Procrit weekly   12/27/18: Doing well. No major complaints. Denies fever, nausea, vomiting, chest pain, SOB, abdominal pain, and bladder problems. On Revlimid 5 mg 2 weeks on 1 week off. This is the week off. She will start the week on sunday (12/30/18). Due for Procrit 82484 units today.  Still has diarrhea. 4-5bm/day. On monthly B12 injections.   1/30/19: Patient here for follow up for MDS.  She is taking Revlimid 5 mg, 2 weeks on, 1 week off.  She will complete this current cycle on Saturday.  She has no new complaints today.  Patient denies cough, shortness of breath, denies fever, denies bone pain.  03/04/2019 Patient is here for a follow up visit. She complaints of severe pain in her left shoulder and has had abscess drained 2 weeks ago. However the pain is worse and she has purulent discharge on examination. She also has Nasal sores that are painful. Culture from 11/2018 showed MRSA.  Denies Fever.  She also complaints of swelling in her right leg. Not tender and not erythematous and negative Gong;s sign.  Her diarrhea with Revlimid is ongoing and Imodium and Lomotil helps but not everyday.  She is on 60,000 units of procrit weekly and Revlimid 5 mg 2 weeks on 1 week off.   4/23/19 Pt is here for follow up. She feels well. The nasal sores have improved with bactroban The abscess on left shoulder has resolved. However she has a new one on the middle of her back. No fever. Pt has been on procrit 29512 units weekly, however she missed a dose in between.  5/8/19 pt is here for follow up. no new complaints. feels well. Her skin abscess has resolved. she has been unable to go to ID, as she could not get an appt. No bleeding. She is compliant with revlimid.  6/6/19 Pt is here for follow up. no new complaints  Feels well. Is on week 2 of her Revlimid cycle.  No transfusions since last visit  7/17/19 Pt is here for follow up. C/O fatigue. Was away for a few days two weeks ago, but missed treatment with procrit for 2 weeks. Is complaint with Revlimid 2 weeks on 1 week off. Diarrhea is a little improved.  8/14/19 Patient here for follow up visit, feeling well.  Although she is complaining of some pain at the left scapula area recently.  Patient denies cough, shortness of breath, denies fever, denies other bone pain.  She is off Revlimid  this week, due to restart on Monday.  She is scheduled for Procrit and Vitamin B12 injection today.  She plans to leave the country tomorrow to visit her mother who is ill.  She will return in about 10 days.  9/11/19 Pt is here for follow up. She was away for 3 weeks, out of which she took procrit for 2 weeks in [place]. She missed last week's dose. She had CBCs in [place] which showed Hgb of 8.9 She feels well. She still getting diarrhea. She has been using lomotil with very minimal relief. She started a new cycle of Revlimid 4 days ago.  10/3/19 Patient here for follow up visit, feeling fairly well.   Patient denies cough, shortness of breath, denies fever, denies other bone pain.  She remains on Revlimid.  She is scheduled for Procrit and Vitamin B12 injection today.  10/24/19 Pt is here for follow up. Feels well. No SOB, fatigue. Compliant with procrit and Revl;imid. Remains on ASa. Diarrhea is unchanged. Pt takes imodium as needed.  11/14/19 Pt is here for follow up. No new complaints. Starts a new cycle of Revlimid today. Diarrhea is same as before, no rectal bleeding. No fever.  12/5/19 Pt is here for follow up. No new complaints. Denies feeling weaker than usual. No fever, SOB. No change in frequency of diarrhea. No pain. Will start next cycle of Revlimid in 3 days.   !/16/20 Pt was recently DCed from [facility] 3 days ago after being treated for pneumonia and MARCO. She is on po Levaquin. She restarted Revlmid 3 days ago. She is feeling better now. No fever. No SOB, Chest pain and back pain has resolved. Pt has a cough, no expectoration. She also recd a unit of PRBCs last week in the hospital   1/30/20 Patient here for follow up visit, feeling well.  She has no new complaints. Cough is almost gone completely.  Patient denies shortness of breath, denies fever, denies bone pain.  Her appetite is ok, weight is stable.  She is due to restart Revlimid on Monday.  02/20/20 Pt is here for follow up, feeling well. Offers no new complaints. Denies SOB, fever, chills, weight loss, CP, cough.  Currently off week of Revlimid 5 mg. Restarts on Monday. Has procrit 80,000 units today. Next b 12 injection due in 2 weeks  Did not get chest Xray CBC reviewed WBC 3.1, h/h 8.3/25%, plt 386, neutrophils 1.29  3/12/20 Pt is here for follow up. She took Revlimid for 2 weeks and then has been off for one week although she was advised to take it daily without break. No SOB, Cough, fever. Has mild fatigue.  04/02/2020 SIMONA KUHN a 75 year F is here today for follow up visit of MDS. Denies fever, chills, cough,night sweats, weight loss.  Denies bleeding or bruising. Pt receives procrit 80,000 units weekly and B12 IM monthly.  Continued Revlimid 5 mg daily x 3 weeks, has 1 dose left tonight.  CBC reviewed: WBC 3.2, H/H 8.1/25.2, neutrophils 1.6, PLT 72  4/20/2020: The patient is here for follow up . She has no complaints of fever, chills, dizziness , chest pain and shortness of breath . She is still with diarrhea , up to 10 watery BMs per day, responds poorly to imodium and lomotil. She is on Revlimid 5 mg daily , took last dose yesterday night. Her last Procrit was on April 13.   5/26/20 Pt is here for follow up. She is feeling well. Denies SOB, chest pain, fever. Continues to have diarrhea although she is off of Revlmid. Thinks it may due to diabetic meds. Wants to discuss BM bx results  6/22/20 Pt is here for follow up. Feels well. Denies any fever, N, V, D Continues to have diarrhea, she will talk to her PCP about changing metformin for DM. Has been needing PRBCs 1 unit each month  7/20/20 Pt is here for follow up. No new complaints. Has been feeling well. No SOB, CP.  No N, V, D. She has recd 2 units of PRBCs since May 20.  8/17/20 Pt is here for a follow up visit. She is feeling well. No new symptoms. No N, V, D. No bruising or bleeding. She continues to need PRBCs every 2-3 weeks.  8/31/20 Pt is here for follow up. She is feeling well. No N, V, D. She is tolerating the hydrea well. No SOB, CP No bruising ior bleeding  9/8/20 Pt is here for follow up. She had been contacted by the [facility] last week to advise her to stop the hydrea. Pt did not check her messages and continued with Hydrea. No fever, N, V, bleeding. Saw Dr Ferrell last week.  9/14/20 Pt is here for folow up. Besides her usual complaint of diarrhea she is feeling well. Recd 1 unit PRBC last week. Has stopped Hydrea. No fever, mouth sores.  9/29/20 Pt is here for folow up. No new complaints. Is seeing GI for diarrhea net week. No fever, night sweats. REcd 3 units of PRBC this month  10/13/20 Pt is here for follow up. No new complaints. Has not needed a transfusion since 3 weeks ago. Continues to have diarrhea, waiting to be seen by GI  10/26/20 Pt is here for follow up. C/O feeling fatigued. Has CLARK. No nausea or vomiting. No fever. Diarrhea has improved a little. She is no longer on Metformin  11/9/20 Pt is here for follow up. Feels well. Denies SOB, CP, fatigue  12/7/20- Patient is for follow up and is due for cycle 9 of Vidaza.  She still has loose BM sometimes 10 times a day and was seeing Dr. Corey from GI- did not follow up recently and is unable to get an appt with him. She has lost about 10 lbs since September. No N/V. No fever or chills. No CP/SOB. She has been getting PRBC transfusion every 3 weeks now  01/04/20 Pt presented today for f/u visit . She is s/p 8 cycles of vidaza and was started on Luspatercept in Dec , 2020 . So far she received 1 injection , she is due for 2nd injection today . Adverse effect profile was discussed with her in detail , she reports having some dizziness and weakness after first injection . She received blood transfusion last wk . Blood work from today reviewed as well and counts are adequate . She denies any fevers,  chills,  or any new symptoms .   1/25/21 Pt is here for follow up. C/O diarrhea, otherwise feeling better. Has not needed a transfusion since 12/29 2/16/21 Pt is here for follow up. Needed transfusion on 2/8/21. Continues to C/o fatigue. Diarrhea remains the same, has not made GI appt as yet.  3/8/21 Pt is here for follow up. Feels better today. Recd 1 unit PRBCs last week. Diarrhea is same as before. has an appt with GI next week. No fever  3/30/21 Pt is here for follow up. Feels better. Last transfusion was on 3/2/21 Saw GI and was started on cholestyramine and metformin was DCED with resolution of diarrhea. She denies any SOB, Fatigue is better  4/20/2021 Pt is here to f/u for MDS She is s/p Luspatercept cycle #6 She is compliant with Aspirin She feels better today, appetite is good She denies shortness of breath, fatigue, or weakness  She c/o of diarrhea but it has improved since she was started on Cholestyramine. Stool is soft and less in frequency She received COVID vaccine x 2 doses  5/11/21 pt is here for follow up. Feels the same with fatigue, diarrhea. No SOB  6/21/21 Pt is here for follow up. Feels well. No SOB, CP, N< V. Diarrhea is better controlled now. Last PRBC transfusion was 2 weeks ago.  7/19/21 Pt is here for follow up. Feels a little tired, last transfusion was 6/1/21. No bleeding, fever, SOB  8/10/2021 Patient is here to follow up for her MDS. She is on Luspatercept, tolerating well. She feels well today, the appetite is good. She denies shortness of breath, fatigue, fever, night sweats, abdominal pain, arthralgias/myalgias.  8/31/21 Pt is here for follow up. C/O feeling very fatigued. No bleeding. No fever.  9/21/2021 Patient is here to follow up for MDS. She is on Luspatercept and Retacrit, tolerating well. She gets blood transfusion as needed for Hgb <7 gm/dL She is c/o of fatigue, no shortness of breath, dizziness, chills or lightheadedness. She denies melena or hematochezia. Her appetite is good, no nausea/vomiting.  10/28/2021 Patient is here to follow up for MDS. She is on Luspatercept and Retacrit, tolerating well. She gets blood transfusion to keep Hgb > 7 gm/dL. She is c/o of chronic fatigue, no shortness of breath, dizziness, chills or lightheadedness. She denies melena or hematochezia. Her appetite is good, no nausea/vomiting. She c/o of severe diarrhea, 10-12x/day watery stool while on Imodium in the last 2 weeks. Last colonoscopy was in 2016, benign as per patient.  11/18/21 Pt is here for follow up. C/O fatigue. No SOB, CP  12/9/21 Pt is here for follow up. Feels better today. Recd 2 units last week in ER. Planning to go to [place] for over a week and does not want to miss her procrit dose. Diarrhea is stable,  12/23/21 Pt is here for follow up. Feels better. No SOB, CP. Going to [place] tomorrow. Has not been able to get Procrit injection from the pharmacy yet d/t insurance issues  2/3/22 Pt is here for follow up. Feeling same as usual. No SOB, CP. Last transfusion was 2 weeks ago in ER> No bleeding reported  3/3/22 Pt is here for follow up. C/O fatigue. Had black stools X2  3/30/22 Pt is feeling well. Here for treatment and BM biopsy  4/21/22 Pt is here fir follow up. Was in ER last week. Was give 2 units of PRBCs. Pt feels less tired today. Wants to discuss BM rslts  5/12/22 Pt is here today for follow up visit. Pt c/o feeling tired.  Hgb=6.9.  One unit transfusion scheduled.  6/23/22 Pt is here for follow up. Feels well today. No SOB, chest pain. Has not made appt with [facility]  7/14/22 Pt is here for follow up for lowrisk myelodysplastic syndrome. She came in late today because she was not feeling well this morning- dizziness & weakness. She denies SOB, CP.  9/22/22 Pt is here for follow up. C/O fatigue. was in the ER 3 weeks ago and recd 2 U PRBCS  11/10/22 Patient is here to follow up for MDS/MPN. S/P 2 units pRBC last weekend, feeling much better now. She denies shortness of breath, chest pain or headache.  1/5/2023 Patient is here to follow up for MDS/MPN, accompanied by spouse and daughter. She is feeling much better after she received blood transfusion on 12/29/22. She denies shortness of breath, chest pain, dizziness or headache. Pt fell on 12/28/22 when she slipped while getting down the stairs and landed on her right knee. Now c/o of severe right knee pain with limited ROM. She has not been evaluated since the incident happened. She is taking Aleeve with minimal relief.  1/19/2023 Patient is here to follow up for MDS/MPN and treatment. She feel fine, has fatigue not worse than her baseline. She denies shortness of breath, chest pain, dizziness, headache or bleeding. She was evaluated at the ER on 1/5/2023 for her fall and imaging showed no fracture, except for mild joint effusion.  2/9/23 Patient presents for follow up today. She complains of light-headedness. Her BP is elevated to 180/70 mmHg. She does not take any meds and says she is not diagnosed with HTN. She is due for Procrit & Luspatercept today. Reports her energy level is at baseline. No other complaints today. Her CMP showed serum glucose > 600 mg/dl, she was sent to ER with the transport.  3/2/32 Patient here for follow up for MDS. She is scheduled for next luspatercept injection. She is status post port placement yesterday, so she has some discomfort at chest area from that. She feels somewhat weak, appetite is adequate, weight is stable. She is under the care of endocrinologist to try to get her DM under better control. She was recommended to take ASA 81 mg when she was discharged from the hospital.  3/23/23 Patient here for follow up for MDS. She is scheduled for next Luspatercept injection. She is status post port placement She feels somewhat weak, appetite is adequate, weight is stable. She is under the care of endocrinologist to try to get her DM under better control. She was recommended to take ASA 81 mg when she was discharged from the hospital.  4/13/23 Pt is here for follow up. Daughter is with her. Neuro eval has not been completed yet. No bleeding, SOB, pain recd 2 Units PRBCs 2 weeks ago  5/4/23 Pt is here for follow up. Pt C/O not feeling well today. Denies pain. Has dizziness. Fasting bld sugar was over 350. She is not on insulin. Denies diarrhea, N, SOB, CP  5/25/23 Pt is here for follow up, accompanied by formal caregiver. She feels well except for fatigue which is unchanged from baseline. She denies chest pain, shortness of breath, bleeding or decreased urinary output. She states that she saw nephro last week and follows up with her PCP for her diabetes.  6/15/23 Pt is here for follow up. Feels weak, No SOB, CP  8/3/23 Pt is here for follow up Feels fatigued. Has. chronic diarrhea which has been attributed to her diabetes meds [de-identified] : \par

## 2023-08-04 NOTE — ASSESSMENT
[FreeTextEntry1] : Patient is a 78 year old female with Lowrisk myelodysplastic syndrome, previously treated with Revlimid and Procrit and Vidaza. 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. Patient is s/p  9 cycles of Vidaza and she was requiring pRBC transfusion every 3 weeks. Given the persistent transfusion dependence she was switched to Luspatercept.  DETECTED GENOMIC ALTERATIONS: Tier III: Variants of Unknown Clinical Significance [id number]  Results of  BM biopsy (3/30/22) showed No e/o increased blasts. Pt is needing PRBCs almost every 3 weeks. Advised to follow up with Dr Ferrell to consider any other option or clinical trial. Called Dr Ferrell's office and discussed case with her. No clinical trial available, Rec PRBCS.  S/P port placement on 3/1/23.  PLAN: Previous notes reviewed including all relevant laboratory results and were communicated to the patient.   -- Continue Luspatercept 1.75 mg/kg every 3 weeks, due today.  CBC from home labs reviewed,  Will transfuse 1 u PRBC today SE discussed including: hypertension, abdominal pain, diarrhea, nausea, increased serum alanine aminotransferase, increased serum aspartate aminotransferase, increased serum bilirubin, dizziness, fatigue, headache, vertigo, arthralgia, musculoskeletal pain, ostealgia, decreased creatinine clearance, cough, dyspnea  The therapy dose was adjusted according to pt's  labs and anticipated tolerance. The high risk of complications and complexity associated with this  therapy administration has been explained to the patient and her daughter and they were both agreeable. Treatment will be administered under my direct supervision.  -- Hold Retacrit for now. -- Monitor CBC weekly and administer 1 pRBC unit  as needed when Hgb < 7 gm/dL.  Pt has e/o iron overload so transfusions need to be kept at a minimum if possible.  She is maintaining her requirement of PRBCs almost Q 3 weeks  # Vitamin  B 12 deficiency -- Continue Vitamin B 12 injection every month,   # Thrombocytosis (controlled) -- previously on Hydrea. -- Will continue to monitor.    RTC in 3 weeks

## 2023-08-17 ENCOUNTER — NON-APPOINTMENT (OUTPATIENT)
Age: 78
End: 2023-08-17

## 2023-08-18 ENCOUNTER — OUTPATIENT (OUTPATIENT)
Dept: OUTPATIENT SERVICES | Facility: HOSPITAL | Age: 78
LOS: 1 days | End: 2023-08-18
Payer: MEDICARE

## 2023-08-18 ENCOUNTER — APPOINTMENT (OUTPATIENT)
Dept: INFUSION THERAPY | Facility: CLINIC | Age: 78
End: 2023-08-18

## 2023-08-18 DIAGNOSIS — E53.8 DEFICIENCY OF OTHER SPECIFIED B GROUP VITAMINS: ICD-10-CM

## 2023-08-18 DIAGNOSIS — Z98.890 OTHER SPECIFIED POSTPROCEDURAL STATES: Chronic | ICD-10-CM

## 2023-08-18 PROCEDURE — P9040: CPT

## 2023-08-18 PROCEDURE — 86923 COMPATIBILITY TEST ELECTRIC: CPT

## 2023-08-18 PROCEDURE — 36430 TRANSFUSION BLD/BLD COMPNT: CPT

## 2023-08-18 PROCEDURE — 36415 COLL VENOUS BLD VENIPUNCTURE: CPT

## 2023-08-18 PROCEDURE — 96372 THER/PROPH/DIAG INJ SC/IM: CPT

## 2023-08-18 RX ORDER — PREGABALIN 225 MG/1
1000 CAPSULE ORAL ONCE
Refills: 0 | Status: COMPLETED | OUTPATIENT
Start: 2023-08-18 | End: 2023-08-18

## 2023-08-18 RX ADMIN — PREGABALIN 1000 MICROGRAM(S): 225 CAPSULE ORAL at 11:41

## 2023-08-24 ENCOUNTER — NON-APPOINTMENT (OUTPATIENT)
Age: 78
End: 2023-08-24

## 2023-08-24 ENCOUNTER — APPOINTMENT (OUTPATIENT)
Dept: HEMATOLOGY ONCOLOGY | Facility: CLINIC | Age: 78
End: 2023-08-24

## 2023-08-24 ENCOUNTER — APPOINTMENT (OUTPATIENT)
Dept: INFUSION THERAPY | Facility: CLINIC | Age: 78
End: 2023-08-24

## 2023-08-25 ENCOUNTER — OUTPATIENT (OUTPATIENT)
Dept: OUTPATIENT SERVICES | Facility: HOSPITAL | Age: 78
LOS: 1 days | End: 2023-08-25
Payer: MEDICARE

## 2023-08-25 ENCOUNTER — APPOINTMENT (OUTPATIENT)
Dept: INFUSION THERAPY | Facility: CLINIC | Age: 78
End: 2023-08-25

## 2023-08-25 ENCOUNTER — LABORATORY RESULT (OUTPATIENT)
Age: 78
End: 2023-08-25

## 2023-08-25 DIAGNOSIS — E53.8 DEFICIENCY OF OTHER SPECIFIED B GROUP VITAMINS: ICD-10-CM

## 2023-08-25 DIAGNOSIS — Z98.890 OTHER SPECIFIED POSTPROCEDURAL STATES: Chronic | ICD-10-CM

## 2023-08-25 PROCEDURE — 36416 COLLJ CAPILLARY BLOOD SPEC: CPT

## 2023-08-25 PROCEDURE — 36415 COLL VENOUS BLD VENIPUNCTURE: CPT

## 2023-08-25 PROCEDURE — 85027 COMPLETE CBC AUTOMATED: CPT

## 2023-08-25 PROCEDURE — 86923 COMPATIBILITY TEST ELECTRIC: CPT

## 2023-08-28 LAB
HCT VFR BLD CALC: 26.3 %
HGB BLD-MCNC: 8.3 G/DL
MCHC RBC-ENTMCNC: 29.3 PG
MCHC RBC-ENTMCNC: 31.6 G/DL
MCV RBC AUTO: 92.9 FL
PLATELET # BLD AUTO: 377 K/UL
PMV BLD: 10 FL
RBC # BLD: 2.83 M/UL
RBC # FLD: 18.6 %
WBC # FLD AUTO: 7.81 K/UL

## 2023-08-30 ENCOUNTER — NON-APPOINTMENT (OUTPATIENT)
Age: 78
End: 2023-08-30

## 2023-08-30 ENCOUNTER — OUTPATIENT (OUTPATIENT)
Dept: OUTPATIENT SERVICES | Facility: HOSPITAL | Age: 78
LOS: 1 days | End: 2023-08-30
Payer: MEDICARE

## 2023-08-30 ENCOUNTER — APPOINTMENT (OUTPATIENT)
Dept: HEMATOLOGY ONCOLOGY | Facility: CLINIC | Age: 78
End: 2023-08-30
Payer: MEDICARE

## 2023-08-30 VITALS — DIASTOLIC BLOOD PRESSURE: 63 MMHG | SYSTOLIC BLOOD PRESSURE: 139 MMHG | HEART RATE: 98 BPM | TEMPERATURE: 97.3 F

## 2023-08-30 DIAGNOSIS — R19.7 DIARRHEA, UNSPECIFIED: ICD-10-CM

## 2023-08-30 DIAGNOSIS — E53.8 DEFICIENCY OF OTHER SPECIFIED B GROUP VITAMINS: ICD-10-CM

## 2023-08-30 DIAGNOSIS — Z98.890 OTHER SPECIFIED POSTPROCEDURAL STATES: Chronic | ICD-10-CM

## 2023-08-30 PROCEDURE — 99214 OFFICE O/P EST MOD 30 MIN: CPT

## 2023-08-30 NOTE — REVIEW OF SYSTEMS
[Fatigue] : fatigue [Recent Change In Weight] : ~T no recent weight change [Shortness Of Breath] : no shortness of breath [Dizziness] : no dizziness [Negative] : Allergic/Immunologic [FreeTextEntry2] : Unchanged fatigue.

## 2023-08-30 NOTE — HISTORY OF PRESENT ILLNESS
[de-identified] : She missed on appointment for procrit last week. She starts her next cycle on 6/25/18 C/o back pain radiating down her left which occurred when she bent to  something. No change in diarrhea.  7/31/18: Doing well. On Revlimid for more than 2yrs, 5 mg 2 weeks on 1 week off. She will be starting week on tonight.  C/o diarrhea. On Imodium 2 pills AM and 2 pills PM. Doesn't help much with diarrhea.  C/o nausea, abdominal pain.    Colonoscopy in 2016 was normal.  Did not have blood transfusion for over 2 years.  On procrit [id number] weekly. Missed last week on 7/24/18 as she was out of town. She has been non compliant with weekly Procrit. Mammogram in 2017 was normal.   9/11/18 pt is here for follow up. She feels the lomotil helps with the diarrhea. She was away for a few weeks and missed her procrit injections. No fever, abdominal  discomfort has been less since since use of lomotil. She started a new cycle 2 days ago.  10/2/18: She will start Revlimid tonight (week on). 2 weeks on, 1 week off.  On ASA 81mg.  Denies fever, nausea, vomiting, chest pain, SOB, abdominal pain, and bladder problems. C/o diarrhea.  S/p 2 units PRBC transfusion on 9/25/18.  On Procrit 59719 units weekly. Not compliant every week.  C/o intermittent dizziness.   10/31/18 Pt is here for follow up. C/O significant fatigue. Continues to have diarrhea, takes imodium and lomotil as needed. C/o dry cough, no fever. Cough started a month ago. It is worse in the mornings however over last week it has been constant all day. No chest pain. She was found to have low B12 levels and was started on B12 injections.  11/27/18: Doing well. Hospitalized for 3 days for cellulitis/abcess of the back s/p drainage and on Abx currently. Developed 3 days after Mg infusion as per pt.  Denies nausea, vomiting, chest pain, SOB, abdominal pain, bowel and bladder problems. This is the week on Revlimid (week 1). S/p 1 unit PRBC transfusion in the hospital.  On Procrit weekly   12/27/18: Doing well. No major complaints. Denies fever, nausea, vomiting, chest pain, SOB, abdominal pain, and bladder problems. On Revlimid 5 mg 2 weeks on 1 week off. This is the week off. She will start the week on sunday (12/30/18). Due for Procrit 48003 units today.  Still has diarrhea. 4-5bm/day. On monthly B12 injections.   1/30/19: Patient here for follow up for MDS.  She is taking Revlimid 5 mg, 2 weeks on, 1 week off.  She will complete this current cycle on Saturday.  She has no new complaints today.  Patient denies cough, shortness of breath, denies fever, denies bone pain.  03/04/2019 Patient is here for a follow up visit. She complaints of severe pain in her left shoulder and has had abscess drained 2 weeks ago. However the pain is worse and she has purulent discharge on examination. She also has Nasal sores that are painful. Culture from 11/2018 showed MRSA.  Denies Fever.  She also complaints of swelling in her right leg. Not tender and not erythematous and negative Gong;s sign.  Her diarrhea with Revlimid is ongoing and Imodium and Lomotil helps but not everyday.  She is on 60,000 units of procrit weekly and Revlimid 5 mg 2 weeks on 1 week off.   4/23/19 Pt is here for follow up. She feels well. The nasal sores have improved with bactroban The abscess on left shoulder has resolved. However she has a new one on the middle of her back. No fever. Pt has been on procrit 30381 units weekly, however she missed a dose in between.  5/8/19 pt is here for follow up. no new complaints. feels well. Her skin abscess has resolved. she has been unable to go to ID, as she could not get an appt. No bleeding. She is compliant with revlimid.  6/6/19 Pt is here for follow up. no new complaints  Feels well. Is on week 2 of her Revlimid cycle.  No transfusions since last visit  7/17/19 Pt is here for follow up. C/O fatigue. Was away for a few days two weeks ago, but missed treatment with procrit for 2 weeks. Is complaint with Revlimid 2 weeks on 1 week off. Diarrhea is a little improved.  8/14/19 Patient here for follow up visit, feeling well.  Although she is complaining of some pain at the left scapula area recently.  Patient denies cough, shortness of breath, denies fever, denies other bone pain.  She is off Revlimid  this week, due to restart on Monday.  She is scheduled for Procrit and Vitamin B12 injection today.  She plans to leave the country tomorrow to visit her mother who is ill.  She will return in about 10 days.  9/11/19 Pt is here for follow up. She was away for 3 weeks, out of which she took procrit for 2 weeks in [place]. She missed last week's dose. She had CBCs in [place] which showed Hgb of 8.9 She feels well. She still getting diarrhea. She has been using lomotil with very minimal relief. She started a new cycle of Revlimid 4 days ago.  10/3/19 Patient here for follow up visit, feeling fairly well.   Patient denies cough, shortness of breath, denies fever, denies other bone pain.  She remains on Revlimid.  She is scheduled for Procrit and Vitamin B12 injection today.  10/24/19 Pt is here for follow up. Feels well. No SOB, fatigue. Compliant with procrit and Revl;imid. Remains on ASa. Diarrhea is unchanged. Pt takes imodium as needed.  11/14/19 Pt is here for follow up. No new complaints. Starts a new cycle of Revlimid today. Diarrhea is same as before, no rectal bleeding. No fever.  12/5/19 Pt is here for follow up. No new complaints. Denies feeling weaker than usual. No fever, SOB. No change in frequency of diarrhea. No pain. Will start next cycle of Revlimid in 3 days.   !/16/20 Pt was recently DCed from [facility] 3 days ago after being treated for pneumonia and MARCO. She is on po Levaquin. She restarted Revlmid 3 days ago. She is feeling better now. No fever. No SOB, Chest pain and back pain has resolved. Pt has a cough, no expectoration. She also recd a unit of PRBCs last week in the hospital   1/30/20 Patient here for follow up visit, feeling well.  She has no new complaints. Cough is almost gone completely.  Patient denies shortness of breath, denies fever, denies bone pain.  Her appetite is ok, weight is stable.  She is due to restart Revlimid on Monday.  02/20/20 Pt is here for follow up, feeling well. Offers no new complaints. Denies SOB, fever, chills, weight loss, CP, cough.  Currently off week of Revlimid 5 mg. Restarts on Monday. Has procrit 80,000 units today. Next b 12 injection due in 2 weeks  Did not get chest Xray CBC reviewed WBC 3.1, h/h 8.3/25%, plt 386, neutrophils 1.29  3/12/20 Pt is here for follow up. She took Revlimid for 2 weeks and then has been off for one week although she was advised to take it daily without break. No SOB, Cough, fever. Has mild fatigue.  04/02/2020 SIMONA KUHN a 75 year F is here today for follow up visit of MDS. Denies fever, chills, cough,night sweats, weight loss.  Denies bleeding or bruising. Pt receives procrit 80,000 units weekly and B12 IM monthly.  Continued Revlimid 5 mg daily x 3 weeks, has 1 dose left tonight.  CBC reviewed: WBC 3.2, H/H 8.1/25.2, neutrophils 1.6, PLT 72  4/20/2020: The patient is here for follow up . She has no complaints of fever, chills, dizziness , chest pain and shortness of breath . She is still with diarrhea , up to 10 watery BMs per day, responds poorly to imodium and lomotil. She is on Revlimid 5 mg daily , took last dose yesterday night. Her last Procrit was on April 13.   5/26/20 Pt is here for follow up. She is feeling well. Denies SOB, chest pain, fever. Continues to have diarrhea although she is off of Revlmid. Thinks it may due to diabetic meds. Wants to discuss BM bx results  6/22/20 Pt is here for follow up. Feels well. Denies any fever, N, V, D Continues to have diarrhea, she will talk to her PCP about changing metformin for DM. Has been needing PRBCs 1 unit each month  7/20/20 Pt is here for follow up. No new complaints. Has been feeling well. No SOB, CP.  No N, V, D. She has recd 2 units of PRBCs since May 20.  8/17/20 Pt is here for a follow up visit. She is feeling well. No new symptoms. No N, V, D. No bruising or bleeding. She continues to need PRBCs every 2-3 weeks.  8/31/20 Pt is here for follow up. She is feeling well. No N, V, D. She is tolerating the hydrea well. No SOB, CP No bruising ior bleeding  9/8/20 Pt is here for follow up. She had been contacted by the [facility] last week to advise her to stop the hydrea. Pt did not check her messages and continued with Hydrea. No fever, N, V, bleeding. Saw Dr Ferrell last week.  9/14/20 Pt is here for folow up. Besides her usual complaint of diarrhea she is feeling well. Recd 1 unit PRBC last week. Has stopped Hydrea. No fever, mouth sores.  9/29/20 Pt is here for folow up. No new complaints. Is seeing GI for diarrhea net week. No fever, night sweats. REcd 3 units of PRBC this month  10/13/20 Pt is here for follow up. No new complaints. Has not needed a transfusion since 3 weeks ago. Continues to have diarrhea, waiting to be seen by GI  10/26/20 Pt is here for follow up. C/O feeling fatigued. Has CLARK. No nausea or vomiting. No fever. Diarrhea has improved a little. She is no longer on Metformin  11/9/20 Pt is here for follow up. Feels well. Denies SOB, CP, fatigue  12/7/20- Patient is for follow up and is due for cycle 9 of Vidaza.  She still has loose BM sometimes 10 times a day and was seeing Dr. Corey from GI- did not follow up recently and is unable to get an appt with him. She has lost about 10 lbs since September. No N/V. No fever or chills. No CP/SOB. She has been getting PRBC transfusion every 3 weeks now  01/04/20 Pt presented today for f/u visit . She is s/p 8 cycles of vidaza and was started on Luspatercept in Dec , 2020 . So far she received 1 injection , she is due for 2nd injection today . Adverse effect profile was discussed with her in detail , she reports having some dizziness and weakness after first injection . She received blood transfusion last wk . Blood work from today reviewed as well and counts are adequate . She denies any fevers,  chills,  or any new symptoms .   1/25/21 Pt is here for follow up. C/O diarrhea, otherwise feeling better. Has not needed a transfusion since 12/29 2/16/21 Pt is here for follow up. Needed transfusion on 2/8/21. Continues to C/o fatigue. Diarrhea remains the same, has not made GI appt as yet.  3/8/21 Pt is here for follow up. Feels better today. Recd 1 unit PRBCs last week. Diarrhea is same as before. has an appt with GI next week. No fever  3/30/21 Pt is here for follow up. Feels better. Last transfusion was on 3/2/21 Saw GI and was started on cholestyramine and metformin was DCED with resolution of diarrhea. She denies any SOB, Fatigue is better  4/20/2021 Pt is here to f/u for MDS She is s/p Luspatercept cycle #6 She is compliant with Aspirin She feels better today, appetite is good She denies shortness of breath, fatigue, or weakness  She c/o of diarrhea but it has improved since she was started on Cholestyramine. Stool is soft and less in frequency She received COVID vaccine x 2 doses  5/11/21 pt is here for follow up. Feels the same with fatigue, diarrhea. No SOB  6/21/21 Pt is here for follow up. Feels well. No SOB, CP, N< V. Diarrhea is better controlled now. Last PRBC transfusion was 2 weeks ago.  7/19/21 Pt is here for follow up. Feels a little tired, last transfusion was 6/1/21. No bleeding, fever, SOB  8/10/2021 Patient is here to follow up for her MDS. She is on Luspatercept, tolerating well. She feels well today, the appetite is good. She denies shortness of breath, fatigue, fever, night sweats, abdominal pain, arthralgias/myalgias.  8/31/21 Pt is here for follow up. C/O feeling very fatigued. No bleeding. No fever.  9/21/2021 Patient is here to follow up for MDS. She is on Luspatercept and Retacrit, tolerating well. She gets blood transfusion as needed for Hgb <7 gm/dL She is c/o of fatigue, no shortness of breath, dizziness, chills or lightheadedness. She denies melena or hematochezia. Her appetite is good, no nausea/vomiting.  10/28/2021 Patient is here to follow up for MDS. She is on Luspatercept and Retacrit, tolerating well. She gets blood transfusion to keep Hgb > 7 gm/dL. She is c/o of chronic fatigue, no shortness of breath, dizziness, chills or lightheadedness. She denies melena or hematochezia. Her appetite is good, no nausea/vomiting. She c/o of severe diarrhea, 10-12x/day watery stool while on Imodium in the last 2 weeks. Last colonoscopy was in 2016, benign as per patient.  11/18/21 Pt is here for follow up. C/O fatigue. No SOB, CP  12/9/21 Pt is here for follow up. Feels better today. Recd 2 units last week in ER. Planning to go to [place] for over a week and does not want to miss her procrit dose. Diarrhea is stable,  12/23/21 Pt is here for follow up. Feels better. No SOB, CP. Going to [place] tomorrow. Has not been able to get Procrit injection from the pharmacy yet d/t insurance issues  2/3/22 Pt is here for follow up. Feeling same as usual. No SOB, CP. Last transfusion was 2 weeks ago in ER> No bleeding reported  3/3/22 Pt is here for follow up. C/O fatigue. Had black stools X2  3/30/22 Pt is feeling well. Here for treatment and BM biopsy  4/21/22 Pt is here fir follow up. Was in ER last week. Was give 2 units of PRBCs. Pt feels less tired today. Wants to discuss BM rslts  5/12/22 Pt is here today for follow up visit. Pt c/o feeling tired.  Hgb=6.9.  One unit transfusion scheduled.  6/23/22 Pt is here for follow up. Feels well today. No SOB, chest pain. Has not made appt with [facility]  7/14/22 Pt is here for follow up for lowrisk myelodysplastic syndrome. She came in late today because she was not feeling well this morning- dizziness & weakness. She denies SOB, CP.  9/22/22 Pt is here for follow up. C/O fatigue. was in the ER 3 weeks ago and recd 2 U PRBCS  11/10/22 Patient is here to follow up for MDS/MPN. S/P 2 units pRBC last weekend, feeling much better now. She denies shortness of breath, chest pain or headache.  1/5/2023 Patient is here to follow up for MDS/MPN, accompanied by spouse and daughter. She is feeling much better after she received blood transfusion on 12/29/22. She denies shortness of breath, chest pain, dizziness or headache. Pt fell on 12/28/22 when she slipped while getting down the stairs and landed on her right knee. Now c/o of severe right knee pain with limited ROM. She has not been evaluated since the incident happened. She is taking Aleeve with minimal relief.  1/19/2023 Patient is here to follow up for MDS/MPN and treatment. She feel fine, has fatigue not worse than her baseline. She denies shortness of breath, chest pain, dizziness, headache or bleeding. She was evaluated at the ER on 1/5/2023 for her fall and imaging showed no fracture, except for mild joint effusion.  2/9/23 Patient presents for follow up today. She complains of light-headedness. Her BP is elevated to 180/70 mmHg. She does not take any meds and says she is not diagnosed with HTN. She is due for Procrit & Luspatercept today. Reports her energy level is at baseline. No other complaints today. Her CMP showed serum glucose > 600 mg/dl, she was sent to ER with the transport.  3/2/32 Patient here for follow up for MDS. She is scheduled for next luspatercept injection. She is status post port placement yesterday, so she has some discomfort at chest area from that. She feels somewhat weak, appetite is adequate, weight is stable. She is under the care of endocrinologist to try to get her DM under better control. She was recommended to take ASA 81 mg when she was discharged from the hospital.  3/23/23 Patient here for follow up for MDS. She is scheduled for next Luspatercept injection. She is status post port placement She feels somewhat weak, appetite is adequate, weight is stable. She is under the care of endocrinologist to try to get her DM under better control. She was recommended to take ASA 81 mg when she was discharged from the hospital.  4/13/23 Pt is here for follow up. Daughter is with her. Neuro eval has not been completed yet. No bleeding, SOB, pain recd 2 Units PRBCs 2 weeks ago  5/4/23 Pt is here for follow up. Pt C/O not feeling well today. Denies pain. Has dizziness. Fasting bld sugar was over 350. She is not on insulin. Denies diarrhea, N, SOB, CP  5/25/23 Pt is here for follow up, accompanied by formal caregiver. She feels well except for fatigue which is unchanged from baseline. She denies chest pain, shortness of breath, bleeding or decreased urinary output. She states that she saw nephro last week and follows up with her PCP for her diabetes.  6/15/23 Pt is here for follow up. Feels weak, No SOB, CP  8/3/23 Pt is here for follow up Feels fatigued. Has. chronic diarrhea which has been attributed to her diabetes meds  8/30/23 Pt is here for follow C/O diarrhea and fatigue [de-identified] : \par

## 2023-08-30 NOTE — PHYSICAL EXAM
[Capable of only limited self care, confined to bed or chair more than 50% of waking hours] : Status 3- Capable of only limited self care, confined to bed or chair more than 50% of waking hours [Normal] : affect appropriate [de-identified] : Ambulatory. [de-identified] : Lower extremity edema - LLE +1, RLE +2-3 [de-identified] : Right chest port-A-cath intact, no erythema or tenderness noted.

## 2023-08-30 NOTE — ASSESSMENT
[FreeTextEntry1] : Patient is a 78 year old female with Lowrisk myelodysplastic syndrome, previously treated with Revlimid and Procrit and Vidaza. 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. Patient is s/p  9 cycles of Vidaza and she was requiring pRBC transfusion every 3 weeks. Given the persistent transfusion dependence she was switched to Luspatercept.  DETECTED GENOMIC ALTERATIONS: Tier III: Variants of Unknown Clinical Significance [id number]  Results of  BM biopsy (3/30/22) showed No e/o increased blasts. Pt is needing PRBCs almost every 3 weeks. Advised to follow up with Dr Ferrell to consider any other option or clinical trial. Called Dr Ferrell's office and discussed case with her. No clinical trial available, Rec PRBCS.  S/P port placement on 3/1/23.  PLAN: Previous notes reviewed including all relevant laboratory results and were communicated to the patient.   -- Continue Luspatercept 1.75 mg/kg every 3 weeks, due today.  CBC from home labs reviewed, hgb 6.6  Will transfuse 1 u PRBC today SE discussed including: hypertension, abdominal pain, diarrhea, nausea, increased serum alanine aminotransferase, increased serum aspartate aminotransferase, increased serum bilirubin, dizziness, fatigue, headache, vertigo, arthralgia, musculoskeletal pain, ostealgia, decreased creatinine clearance, cough, dyspnea  The therapy dose was adjusted according to pt's  labs and anticipated tolerance. The high risk of complications and complexity associated with this  therapy administration has been explained to the patient and her daughter and they were both agreeable. Treatment will be administered under my direct supervision.  -- Hold Retacrit for now. -- Monitor CBC weekly and administer 1 pRBC unit  as needed when Hgb < 7 gm/dL.  Pt has e/o iron overload so transfusions need to be kept at a minimum if possible.  She is maintaining her requirement of PRBCs almost Q 3 weeks  # Vitamin  B 12 deficiency -- Continue Vitamin B 12 injection every month,   # Thrombocytosis (controlled) -- previously on Hydrea. -- Will continue to monitor.    RTC in 3 weeks

## 2023-08-31 ENCOUNTER — OUTPATIENT (OUTPATIENT)
Dept: OUTPATIENT SERVICES | Facility: HOSPITAL | Age: 78
LOS: 1 days | End: 2023-08-31
Payer: MEDICARE

## 2023-08-31 ENCOUNTER — APPOINTMENT (OUTPATIENT)
Dept: INFUSION THERAPY | Facility: CLINIC | Age: 78
End: 2023-08-31

## 2023-08-31 DIAGNOSIS — E53.8 DEFICIENCY OF OTHER SPECIFIED B GROUP VITAMINS: ICD-10-CM

## 2023-08-31 DIAGNOSIS — Z98.890 OTHER SPECIFIED POSTPROCEDURAL STATES: Chronic | ICD-10-CM

## 2023-08-31 PROCEDURE — 36430 TRANSFUSION BLD/BLD COMPNT: CPT

## 2023-08-31 PROCEDURE — 96401 CHEMO ANTI-NEOPL SQ/IM: CPT

## 2023-08-31 PROCEDURE — P9040: CPT

## 2023-08-31 RX ORDER — LUSPATERCEPT 75 MG/1
110 INJECTION, POWDER, LYOPHILIZED, FOR SOLUTION SUBCUTANEOUS ONCE
Refills: 0 | Status: COMPLETED | OUTPATIENT
Start: 2023-08-31 | End: 2023-08-31

## 2023-08-31 RX ADMIN — LUSPATERCEPT 110 MILLIGRAM(S): 75 INJECTION, POWDER, LYOPHILIZED, FOR SOLUTION SUBCUTANEOUS at 15:14

## 2023-09-08 ENCOUNTER — APPOINTMENT (OUTPATIENT)
Dept: INFUSION THERAPY | Facility: CLINIC | Age: 78
End: 2023-09-08

## 2023-09-14 ENCOUNTER — APPOINTMENT (OUTPATIENT)
Dept: HEMATOLOGY ONCOLOGY | Facility: CLINIC | Age: 78
End: 2023-09-14

## 2023-09-14 ENCOUNTER — APPOINTMENT (OUTPATIENT)
Dept: INFUSION THERAPY | Facility: CLINIC | Age: 78
End: 2023-09-14

## 2023-09-15 ENCOUNTER — APPOINTMENT (OUTPATIENT)
Dept: INFUSION THERAPY | Facility: CLINIC | Age: 78
End: 2023-09-15

## 2023-09-18 ENCOUNTER — APPOINTMENT (OUTPATIENT)
Dept: HEMATOLOGY ONCOLOGY | Facility: CLINIC | Age: 78
End: 2023-09-18
Payer: MEDICARE

## 2023-09-18 ENCOUNTER — OUTPATIENT (OUTPATIENT)
Dept: OUTPATIENT SERVICES | Facility: HOSPITAL | Age: 78
LOS: 1 days | End: 2023-09-18
Payer: MEDICARE

## 2023-09-18 ENCOUNTER — NON-APPOINTMENT (OUTPATIENT)
Age: 78
End: 2023-09-18

## 2023-09-18 ENCOUNTER — LABORATORY RESULT (OUTPATIENT)
Age: 78
End: 2023-09-18

## 2023-09-18 DIAGNOSIS — Z98.890 OTHER SPECIFIED POSTPROCEDURAL STATES: Chronic | ICD-10-CM

## 2023-09-18 DIAGNOSIS — D46.9 MYELODYSPLASTIC SYNDROME, UNSPECIFIED: ICD-10-CM

## 2023-09-18 LAB
ABO + RH PNL BLD: NORMAL
BLD GP AB SCN SERPL QL: NORMAL
HCT VFR BLD CALC: 20.4 %
HGB BLD-MCNC: 6.4 G/DL
MCHC RBC-ENTMCNC: 29 PG
MCHC RBC-ENTMCNC: 31.4 G/DL
MCV RBC AUTO: 92.3 FL
PLATELET # BLD AUTO: 426 K/UL
PMV BLD: 10.3 FL
RBC # BLD: 2.21 M/UL
RBC # FLD: 20 %
WBC # FLD AUTO: 7.29 K/UL

## 2023-09-18 PROCEDURE — 99213 OFFICE O/P EST LOW 20 MIN: CPT

## 2023-09-18 PROCEDURE — 86923 COMPATIBILITY TEST ELECTRIC: CPT

## 2023-09-18 PROCEDURE — 86901 BLOOD TYPING SEROLOGIC RH(D): CPT

## 2023-09-18 PROCEDURE — 86900 BLOOD TYPING SEROLOGIC ABO: CPT

## 2023-09-18 PROCEDURE — 86850 RBC ANTIBODY SCREEN: CPT

## 2023-09-18 PROCEDURE — 36415 COLL VENOUS BLD VENIPUNCTURE: CPT

## 2023-09-18 PROCEDURE — 85027 COMPLETE CBC AUTOMATED: CPT

## 2023-09-19 ENCOUNTER — OUTPATIENT (OUTPATIENT)
Dept: OUTPATIENT SERVICES | Facility: HOSPITAL | Age: 78
LOS: 1 days | End: 2023-09-19
Payer: MEDICARE

## 2023-09-19 ENCOUNTER — APPOINTMENT (OUTPATIENT)
Dept: INFUSION THERAPY | Facility: CLINIC | Age: 78
End: 2023-09-19

## 2023-09-19 DIAGNOSIS — Z98.890 OTHER SPECIFIED POSTPROCEDURAL STATES: Chronic | ICD-10-CM

## 2023-09-19 DIAGNOSIS — D46.9 MYELODYSPLASTIC SYNDROME, UNSPECIFIED: ICD-10-CM

## 2023-09-19 PROCEDURE — 36430 TRANSFUSION BLD/BLD COMPNT: CPT

## 2023-09-19 PROCEDURE — 96401 CHEMO ANTI-NEOPL SQ/IM: CPT

## 2023-09-19 PROCEDURE — P9040: CPT

## 2023-09-19 PROCEDURE — 96372 THER/PROPH/DIAG INJ SC/IM: CPT

## 2023-09-19 RX ORDER — LUSPATERCEPT 75 MG/1
110 INJECTION, POWDER, LYOPHILIZED, FOR SOLUTION SUBCUTANEOUS ONCE
Refills: 0 | Status: COMPLETED | OUTPATIENT
Start: 2023-09-19 | End: 2023-09-19

## 2023-09-19 RX ORDER — PREGABALIN 225 MG/1
1000 CAPSULE ORAL ONCE
Refills: 0 | Status: COMPLETED | OUTPATIENT
Start: 2023-09-19 | End: 2023-09-19

## 2023-09-19 RX ADMIN — LUSPATERCEPT 110 MILLIGRAM(S): 75 INJECTION, POWDER, LYOPHILIZED, FOR SOLUTION SUBCUTANEOUS at 10:16

## 2023-09-19 RX ADMIN — PREGABALIN 1000 MICROGRAM(S): 225 CAPSULE ORAL at 10:16

## 2023-09-28 ENCOUNTER — APPOINTMENT (OUTPATIENT)
Dept: INFUSION THERAPY | Facility: CLINIC | Age: 78
End: 2023-09-28

## 2023-10-04 ENCOUNTER — NON-APPOINTMENT (OUTPATIENT)
Age: 78
End: 2023-10-04

## 2023-10-05 ENCOUNTER — APPOINTMENT (OUTPATIENT)
Dept: INFUSION THERAPY | Facility: CLINIC | Age: 78
End: 2023-10-05

## 2023-10-05 ENCOUNTER — OUTPATIENT (OUTPATIENT)
Dept: OUTPATIENT SERVICES | Facility: HOSPITAL | Age: 78
LOS: 1 days | End: 2023-10-05
Payer: MEDICARE

## 2023-10-05 ENCOUNTER — APPOINTMENT (OUTPATIENT)
Dept: HEMATOLOGY ONCOLOGY | Facility: CLINIC | Age: 78
End: 2023-10-05

## 2023-10-05 DIAGNOSIS — D46.9 MYELODYSPLASTIC SYNDROME, UNSPECIFIED: ICD-10-CM

## 2023-10-05 DIAGNOSIS — Z98.890 OTHER SPECIFIED POSTPROCEDURAL STATES: Chronic | ICD-10-CM

## 2023-10-05 PROCEDURE — P9040: CPT

## 2023-10-05 PROCEDURE — 36430 TRANSFUSION BLD/BLD COMPNT: CPT

## 2023-10-06 DIAGNOSIS — D46.9 MYELODYSPLASTIC SYNDROME, UNSPECIFIED: ICD-10-CM

## 2023-10-10 ENCOUNTER — APPOINTMENT (OUTPATIENT)
Dept: INFUSION THERAPY | Facility: CLINIC | Age: 78
End: 2023-10-10

## 2023-10-11 ENCOUNTER — NON-APPOINTMENT (OUTPATIENT)
Age: 78
End: 2023-10-11

## 2023-10-13 ENCOUNTER — NON-APPOINTMENT (OUTPATIENT)
Age: 78
End: 2023-10-13

## 2023-10-17 ENCOUNTER — LABORATORY RESULT (OUTPATIENT)
Age: 78
End: 2023-10-17

## 2023-10-17 ENCOUNTER — OUTPATIENT (OUTPATIENT)
Dept: OUTPATIENT SERVICES | Facility: HOSPITAL | Age: 78
LOS: 1 days | End: 2023-10-17
Payer: MEDICARE

## 2023-10-17 ENCOUNTER — APPOINTMENT (OUTPATIENT)
Dept: INFUSION THERAPY | Facility: CLINIC | Age: 78
End: 2023-10-17

## 2023-10-17 DIAGNOSIS — D46.9 MYELODYSPLASTIC SYNDROME, UNSPECIFIED: ICD-10-CM

## 2023-10-17 DIAGNOSIS — Z98.890 OTHER SPECIFIED POSTPROCEDURAL STATES: Chronic | ICD-10-CM

## 2023-10-17 PROCEDURE — 85027 COMPLETE CBC AUTOMATED: CPT

## 2023-10-17 PROCEDURE — 96372 THER/PROPH/DIAG INJ SC/IM: CPT

## 2023-10-17 PROCEDURE — 96401 CHEMO ANTI-NEOPL SQ/IM: CPT

## 2023-10-17 RX ORDER — LUSPATERCEPT 75 MG/1
110 INJECTION, POWDER, LYOPHILIZED, FOR SOLUTION SUBCUTANEOUS ONCE
Refills: 0 | Status: COMPLETED | OUTPATIENT
Start: 2023-10-17 | End: 2023-10-17

## 2023-10-17 RX ORDER — PREGABALIN 225 MG/1
1000 CAPSULE ORAL ONCE
Refills: 0 | Status: COMPLETED | OUTPATIENT
Start: 2023-10-17 | End: 2023-10-17

## 2023-10-17 RX ADMIN — LUSPATERCEPT 110 MILLIGRAM(S): 75 INJECTION, POWDER, LYOPHILIZED, FOR SOLUTION SUBCUTANEOUS at 11:48

## 2023-10-17 RX ADMIN — PREGABALIN 1000 MICROGRAM(S): 225 CAPSULE ORAL at 11:23

## 2023-10-23 ENCOUNTER — NON-APPOINTMENT (OUTPATIENT)
Age: 78
End: 2023-10-23

## 2023-10-23 LAB
HCT VFR BLD CALC: 21.3 %
HGB BLD-MCNC: 7.1 G/DL
MCHC RBC-ENTMCNC: 29.7 PG
MCHC RBC-ENTMCNC: 33.3 G/DL
MCV RBC AUTO: 89.1 FL
PLATELET # BLD AUTO: 475 K/UL
PMV BLD: 10.6 FL
RBC # BLD: 2.39 M/UL
RBC # FLD: 20.1 %
WBC # FLD AUTO: 7.03 K/UL

## 2023-10-26 ENCOUNTER — OUTPATIENT (OUTPATIENT)
Dept: OUTPATIENT SERVICES | Facility: HOSPITAL | Age: 78
LOS: 1 days | End: 2023-10-26
Payer: MEDICARE

## 2023-10-26 ENCOUNTER — APPOINTMENT (OUTPATIENT)
Dept: INFUSION THERAPY | Facility: CLINIC | Age: 78
End: 2023-10-26

## 2023-10-26 DIAGNOSIS — D46.9 MYELODYSPLASTIC SYNDROME, UNSPECIFIED: ICD-10-CM

## 2023-10-26 DIAGNOSIS — Z98.890 OTHER SPECIFIED POSTPROCEDURAL STATES: Chronic | ICD-10-CM

## 2023-10-26 PROCEDURE — 36415 COLL VENOUS BLD VENIPUNCTURE: CPT

## 2023-10-26 PROCEDURE — P9040: CPT

## 2023-10-26 PROCEDURE — 86923 COMPATIBILITY TEST ELECTRIC: CPT

## 2023-10-26 PROCEDURE — 36430 TRANSFUSION BLD/BLD COMPNT: CPT

## 2023-11-07 ENCOUNTER — LABORATORY RESULT (OUTPATIENT)
Age: 78
End: 2023-11-07

## 2023-11-07 ENCOUNTER — APPOINTMENT (OUTPATIENT)
Dept: INFUSION THERAPY | Facility: CLINIC | Age: 78
End: 2023-11-07
Payer: MEDICARE

## 2023-11-07 ENCOUNTER — OUTPATIENT (OUTPATIENT)
Dept: OUTPATIENT SERVICES | Facility: HOSPITAL | Age: 78
LOS: 1 days | End: 2023-11-07
Payer: MEDICARE

## 2023-11-07 ENCOUNTER — APPOINTMENT (OUTPATIENT)
Dept: HEMATOLOGY ONCOLOGY | Facility: CLINIC | Age: 78
End: 2023-11-07
Payer: MEDICARE

## 2023-11-07 VITALS
BODY MASS INDEX: 23.92 KG/M2 | DIASTOLIC BLOOD PRESSURE: 77 MMHG | HEART RATE: 60 BPM | HEIGHT: 62 IN | SYSTOLIC BLOOD PRESSURE: 144 MMHG | WEIGHT: 130 LBS | TEMPERATURE: 98 F

## 2023-11-07 DIAGNOSIS — D46.9 MYELODYSPLASTIC SYNDROME, UNSPECIFIED: ICD-10-CM

## 2023-11-07 DIAGNOSIS — Z98.890 OTHER SPECIFIED POSTPROCEDURAL STATES: Chronic | ICD-10-CM

## 2023-11-07 DIAGNOSIS — D64.9 ANEMIA, UNSPECIFIED: ICD-10-CM

## 2023-11-07 LAB
HCT VFR BLD CALC: 22.9 %
HGB BLD-MCNC: 7.5 G/DL
MCHC RBC-ENTMCNC: 28.7 PG
MCHC RBC-ENTMCNC: 32.8 G/DL
MCV RBC AUTO: 87.7 FL
PLATELET # BLD AUTO: 462 K/UL
PMV BLD: 10.2 FL
RBC # BLD: 2.61 M/UL
RBC # FLD: 17.9 %
WBC # FLD AUTO: 8.07 K/UL

## 2023-11-07 PROCEDURE — 99214 OFFICE O/P EST MOD 30 MIN: CPT

## 2023-11-07 PROCEDURE — 96401 CHEMO ANTI-NEOPL SQ/IM: CPT

## 2023-11-07 PROCEDURE — 85027 COMPLETE CBC AUTOMATED: CPT

## 2023-11-07 RX ORDER — LUSPATERCEPT 75 MG/1
110 INJECTION, POWDER, LYOPHILIZED, FOR SOLUTION SUBCUTANEOUS ONCE
Refills: 0 | Status: COMPLETED | OUTPATIENT
Start: 2023-11-07 | End: 2023-11-07

## 2023-11-07 RX ADMIN — LUSPATERCEPT 110 MILLIGRAM(S): 75 INJECTION, POWDER, LYOPHILIZED, FOR SOLUTION SUBCUTANEOUS at 13:01

## 2023-11-08 DIAGNOSIS — D46.9 MYELODYSPLASTIC SYNDROME, UNSPECIFIED: ICD-10-CM

## 2023-11-14 ENCOUNTER — LABORATORY RESULT (OUTPATIENT)
Age: 78
End: 2023-11-14

## 2023-11-14 ENCOUNTER — APPOINTMENT (OUTPATIENT)
Dept: INFUSION THERAPY | Facility: CLINIC | Age: 78
End: 2023-11-14

## 2023-11-14 ENCOUNTER — OUTPATIENT (OUTPATIENT)
Dept: OUTPATIENT SERVICES | Facility: HOSPITAL | Age: 78
LOS: 1 days | End: 2023-11-14
Payer: MEDICARE

## 2023-11-14 DIAGNOSIS — D64.9 ANEMIA, UNSPECIFIED: ICD-10-CM

## 2023-11-14 DIAGNOSIS — Z98.890 OTHER SPECIFIED POSTPROCEDURAL STATES: Chronic | ICD-10-CM

## 2023-11-14 LAB
HCT VFR BLD CALC: 22.1 %
HGB BLD-MCNC: 7.3 G/DL
MCHC RBC-ENTMCNC: 29.1 PG
MCHC RBC-ENTMCNC: 33 G/DL
MCV RBC AUTO: 88 FL
PLATELET # BLD AUTO: 442 K/UL
PMV BLD: 11 FL
RBC # BLD: 2.51 M/UL
RBC # FLD: 18.2 %
WBC # FLD AUTO: 9.54 K/UL

## 2023-11-14 PROCEDURE — 85027 COMPLETE CBC AUTOMATED: CPT

## 2023-11-14 PROCEDURE — 96372 THER/PROPH/DIAG INJ SC/IM: CPT

## 2023-11-14 RX ORDER — PREGABALIN 225 MG/1
1000 CAPSULE ORAL ONCE
Refills: 0 | Status: COMPLETED | OUTPATIENT
Start: 2023-11-14 | End: 2023-11-14

## 2023-11-14 RX ADMIN — PREGABALIN 1000 MICROGRAM(S): 225 CAPSULE ORAL at 12:08

## 2023-11-20 ENCOUNTER — OUTPATIENT (OUTPATIENT)
Dept: OUTPATIENT SERVICES | Facility: HOSPITAL | Age: 78
LOS: 1 days | End: 2023-11-20
Payer: MEDICARE

## 2023-11-20 ENCOUNTER — APPOINTMENT (OUTPATIENT)
Dept: HEMATOLOGY ONCOLOGY | Facility: CLINIC | Age: 78
End: 2023-11-20

## 2023-11-20 ENCOUNTER — LABORATORY RESULT (OUTPATIENT)
Age: 78
End: 2023-11-20

## 2023-11-20 DIAGNOSIS — D64.9 ANEMIA, UNSPECIFIED: ICD-10-CM

## 2023-11-20 DIAGNOSIS — Z98.890 OTHER SPECIFIED POSTPROCEDURAL STATES: Chronic | ICD-10-CM

## 2023-11-20 LAB
ABO + RH PNL BLD: NORMAL
BASOPHILS # BLD AUTO: 0.08 K/UL
BASOPHILS NFR BLD AUTO: 1.1 %
BLD GP AB SCN SERPL QL: NORMAL
EOSINOPHIL # BLD AUTO: 0.01 K/UL
EOSINOPHIL NFR BLD AUTO: 0.1 %
HCT VFR BLD CALC: 17.1 %
HCT VFR BLD CALC: 17.6 %
HGB BLD-MCNC: 5.6 G/DL
HGB BLD-MCNC: 5.7 G/DL
IMM GRANULOCYTES NFR BLD AUTO: 1 %
LYMPHOCYTES # BLD AUTO: 0.6 K/UL
LYMPHOCYTES NFR BLD AUTO: 8.5 %
MAN DIFF?: NORMAL
MCHC RBC-ENTMCNC: 29.1 PG
MCHC RBC-ENTMCNC: 29.5 PG
MCHC RBC-ENTMCNC: 32.4 G/DL
MCHC RBC-ENTMCNC: 32.7 G/DL
MCV RBC AUTO: 89.8 FL
MCV RBC AUTO: 90 FL
MONOCYTES # BLD AUTO: 0.92 K/UL
MONOCYTES NFR BLD AUTO: 13 %
NEUTROPHILS # BLD AUTO: 5.39 K/UL
NEUTROPHILS NFR BLD AUTO: 76.3 %
PLATELET # BLD AUTO: 423 K/UL
PLATELET # BLD AUTO: 427 K/UL
PMV BLD: 9.7 FL
RBC # BLD: 1.9 M/UL
RBC # BLD: 1.96 M/UL
RBC # FLD: 18.6 %
RBC # FLD: 21.3 %
WBC # FLD AUTO: 7.07 K/UL
WBC # FLD AUTO: 8.11 K/UL

## 2023-11-20 PROCEDURE — 86900 BLOOD TYPING SEROLOGIC ABO: CPT

## 2023-11-20 PROCEDURE — 86850 RBC ANTIBODY SCREEN: CPT

## 2023-11-20 PROCEDURE — 36416 COLLJ CAPILLARY BLOOD SPEC: CPT

## 2023-11-20 PROCEDURE — 86901 BLOOD TYPING SEROLOGIC RH(D): CPT

## 2023-11-20 PROCEDURE — 86923 COMPATIBILITY TEST ELECTRIC: CPT

## 2023-11-20 PROCEDURE — 85027 COMPLETE CBC AUTOMATED: CPT

## 2023-11-21 ENCOUNTER — APPOINTMENT (OUTPATIENT)
Dept: INFUSION THERAPY | Facility: CLINIC | Age: 78
End: 2023-11-21

## 2023-11-21 ENCOUNTER — OUTPATIENT (OUTPATIENT)
Dept: OUTPATIENT SERVICES | Facility: HOSPITAL | Age: 78
LOS: 1 days | End: 2023-11-21
Payer: MEDICARE

## 2023-11-21 DIAGNOSIS — D64.9 ANEMIA, UNSPECIFIED: ICD-10-CM

## 2023-11-21 DIAGNOSIS — Z98.890 OTHER SPECIFIED POSTPROCEDURAL STATES: Chronic | ICD-10-CM

## 2023-11-21 PROCEDURE — 36430 TRANSFUSION BLD/BLD COMPNT: CPT

## 2023-11-21 PROCEDURE — P9040: CPT

## 2023-11-22 NOTE — PATIENT PROFILE ADULT - NSPROEDALEARNPREF_GEN_A_NUR
audio Topical Clindamycin Counseling: Patient counseled that this medication may cause skin irritation or allergic reactions.  In the event of skin irritation, the patient was advised to reduce the amount of the drug applied or use it less frequently.   The patient verbalized understanding of the proper use and possible adverse effects of clindamycin.  All of the patient's questions and concerns were addressed.

## 2023-11-28 ENCOUNTER — APPOINTMENT (OUTPATIENT)
Dept: HEMATOLOGY ONCOLOGY | Facility: CLINIC | Age: 78
End: 2023-11-28
Payer: MEDICARE

## 2023-11-28 ENCOUNTER — OUTPATIENT (OUTPATIENT)
Dept: OUTPATIENT SERVICES | Facility: HOSPITAL | Age: 78
LOS: 1 days | End: 2023-11-28
Payer: MEDICARE

## 2023-11-28 ENCOUNTER — LABORATORY RESULT (OUTPATIENT)
Age: 78
End: 2023-11-28

## 2023-11-28 ENCOUNTER — APPOINTMENT (OUTPATIENT)
Dept: INFUSION THERAPY | Facility: CLINIC | Age: 78
End: 2023-11-28
Payer: MEDICARE

## 2023-11-28 VITALS
TEMPERATURE: 98.6 F | WEIGHT: 130 LBS | HEART RATE: 90 BPM | BODY MASS INDEX: 23.92 KG/M2 | HEIGHT: 62 IN | DIASTOLIC BLOOD PRESSURE: 72 MMHG | SYSTOLIC BLOOD PRESSURE: 182 MMHG

## 2023-11-28 DIAGNOSIS — Z98.890 OTHER SPECIFIED POSTPROCEDURAL STATES: Chronic | ICD-10-CM

## 2023-11-28 DIAGNOSIS — D64.9 ANEMIA, UNSPECIFIED: ICD-10-CM

## 2023-11-28 LAB
HCT VFR BLD CALC: 24.7 %
HGB BLD-MCNC: 8.3 G/DL
MCHC RBC-ENTMCNC: 30.2 PG
MCHC RBC-ENTMCNC: 33.6 G/DL
MCV RBC AUTO: 89.8 FL
PLATELET # BLD AUTO: 257 K/UL
PMV BLD: 11.3 FL
RBC # BLD: 2.75 M/UL
RBC # FLD: 17.7 %
WBC # FLD AUTO: 7.87 K/UL

## 2023-11-28 PROCEDURE — 99215 OFFICE O/P EST HI 40 MIN: CPT

## 2023-11-28 PROCEDURE — 99215 OFFICE O/P EST HI 40 MIN: CPT | Mod: 25

## 2023-11-28 PROCEDURE — 85027 COMPLETE CBC AUTOMATED: CPT

## 2023-11-28 PROCEDURE — 96372 THER/PROPH/DIAG INJ SC/IM: CPT

## 2023-11-28 RX ORDER — PREGABALIN 225 MG/1
1000 CAPSULE ORAL ONCE
Refills: 0 | Status: COMPLETED | OUTPATIENT
Start: 2023-11-28 | End: 2023-11-28

## 2023-11-28 RX ORDER — LUSPATERCEPT 75 MG/1
110 INJECTION, POWDER, LYOPHILIZED, FOR SOLUTION SUBCUTANEOUS ONCE
Refills: 0 | Status: COMPLETED | OUTPATIENT
Start: 2023-11-28 | End: 2023-11-28

## 2023-11-28 RX ADMIN — LUSPATERCEPT 110 MILLIGRAM(S): 75 INJECTION, POWDER, LYOPHILIZED, FOR SOLUTION SUBCUTANEOUS at 13:53

## 2023-11-28 RX ADMIN — PREGABALIN 1000 MICROGRAM(S): 225 CAPSULE ORAL at 13:53

## 2023-12-13 ENCOUNTER — NON-APPOINTMENT (OUTPATIENT)
Age: 78
End: 2023-12-13

## 2023-12-14 ENCOUNTER — APPOINTMENT (OUTPATIENT)
Dept: INFUSION THERAPY | Facility: CLINIC | Age: 78
End: 2023-12-14

## 2023-12-14 ENCOUNTER — INPATIENT (INPATIENT)
Facility: HOSPITAL | Age: 78
LOS: 1 days | Discharge: HOME HEALTH | DRG: 812 | End: 2023-12-16
Attending: HOSPITALIST | Admitting: STUDENT IN AN ORGANIZED HEALTH CARE EDUCATION/TRAINING PROGRAM
Payer: MEDICARE

## 2023-12-14 ENCOUNTER — APPOINTMENT (OUTPATIENT)
Dept: HEMATOLOGY ONCOLOGY | Facility: CLINIC | Age: 78
End: 2023-12-14

## 2023-12-14 VITALS
HEART RATE: 95 BPM | DIASTOLIC BLOOD PRESSURE: 70 MMHG | RESPIRATION RATE: 20 BRPM | WEIGHT: 138.89 LBS | TEMPERATURE: 98 F | OXYGEN SATURATION: 100 % | HEIGHT: 62 IN | SYSTOLIC BLOOD PRESSURE: 158 MMHG

## 2023-12-14 DIAGNOSIS — D64.9 ANEMIA, UNSPECIFIED: ICD-10-CM

## 2023-12-14 DIAGNOSIS — Z98.890 OTHER SPECIFIED POSTPROCEDURAL STATES: Chronic | ICD-10-CM

## 2023-12-14 LAB
ALBUMIN SERPL ELPH-MCNC: 4.5 G/DL — SIGNIFICANT CHANGE UP (ref 3.5–5.2)
ALBUMIN SERPL ELPH-MCNC: 4.5 G/DL — SIGNIFICANT CHANGE UP (ref 3.5–5.2)
ALP SERPL-CCNC: 108 U/L — SIGNIFICANT CHANGE UP (ref 30–115)
ALP SERPL-CCNC: 108 U/L — SIGNIFICANT CHANGE UP (ref 30–115)
ALT FLD-CCNC: 14 U/L — SIGNIFICANT CHANGE UP (ref 0–41)
ALT FLD-CCNC: 14 U/L — SIGNIFICANT CHANGE UP (ref 0–41)
ANION GAP SERPL CALC-SCNC: 10 MMOL/L — SIGNIFICANT CHANGE UP (ref 7–14)
ANION GAP SERPL CALC-SCNC: 10 MMOL/L — SIGNIFICANT CHANGE UP (ref 7–14)
ANION GAP SERPL CALC-SCNC: 12 MMOL/L — SIGNIFICANT CHANGE UP (ref 7–14)
ANION GAP SERPL CALC-SCNC: 12 MMOL/L — SIGNIFICANT CHANGE UP (ref 7–14)
ANION GAP SERPL CALC-SCNC: 9 MMOL/L — SIGNIFICANT CHANGE UP (ref 7–14)
ANION GAP SERPL CALC-SCNC: 9 MMOL/L — SIGNIFICANT CHANGE UP (ref 7–14)
ANISOCYTOSIS BLD QL: SIGNIFICANT CHANGE UP
ANISOCYTOSIS BLD QL: SIGNIFICANT CHANGE UP
AST SERPL-CCNC: 13 U/L — SIGNIFICANT CHANGE UP (ref 0–41)
AST SERPL-CCNC: 13 U/L — SIGNIFICANT CHANGE UP (ref 0–41)
BASE EXCESS BLDV CALC-SCNC: -5.1 MMOL/L — LOW (ref -2–3)
BASE EXCESS BLDV CALC-SCNC: -5.1 MMOL/L — LOW (ref -2–3)
BASOPHILS # BLD AUTO: 0.25 K/UL — HIGH (ref 0–0.2)
BASOPHILS # BLD AUTO: 0.25 K/UL — HIGH (ref 0–0.2)
BASOPHILS NFR BLD AUTO: 2.6 % — HIGH (ref 0–1)
BASOPHILS NFR BLD AUTO: 2.6 % — HIGH (ref 0–1)
BILIRUB SERPL-MCNC: 0.7 MG/DL — SIGNIFICANT CHANGE UP (ref 0.2–1.2)
BILIRUB SERPL-MCNC: 0.7 MG/DL — SIGNIFICANT CHANGE UP (ref 0.2–1.2)
BLD GP AB SCN SERPL QL: SIGNIFICANT CHANGE UP
BLD GP AB SCN SERPL QL: SIGNIFICANT CHANGE UP
BUN SERPL-MCNC: 42 MG/DL — HIGH (ref 10–20)
BUN SERPL-MCNC: 42 MG/DL — HIGH (ref 10–20)
BUN SERPL-MCNC: 44 MG/DL — HIGH (ref 10–20)
CALCIUM SERPL-MCNC: 8.7 MG/DL — SIGNIFICANT CHANGE UP (ref 8.4–10.5)
CALCIUM SERPL-MCNC: 8.7 MG/DL — SIGNIFICANT CHANGE UP (ref 8.4–10.5)
CALCIUM SERPL-MCNC: 8.9 MG/DL — SIGNIFICANT CHANGE UP (ref 8.4–10.5)
CALCIUM SERPL-MCNC: 8.9 MG/DL — SIGNIFICANT CHANGE UP (ref 8.4–10.5)
CALCIUM SERPL-MCNC: 9.1 MG/DL — SIGNIFICANT CHANGE UP (ref 8.4–10.5)
CALCIUM SERPL-MCNC: 9.1 MG/DL — SIGNIFICANT CHANGE UP (ref 8.4–10.5)
CHLORIDE SERPL-SCNC: 100 MMOL/L — SIGNIFICANT CHANGE UP (ref 98–110)
CHLORIDE SERPL-SCNC: 100 MMOL/L — SIGNIFICANT CHANGE UP (ref 98–110)
CHLORIDE SERPL-SCNC: 102 MMOL/L — SIGNIFICANT CHANGE UP (ref 98–110)
CHLORIDE SERPL-SCNC: 102 MMOL/L — SIGNIFICANT CHANGE UP (ref 98–110)
CHLORIDE SERPL-SCNC: 107 MMOL/L — SIGNIFICANT CHANGE UP (ref 98–110)
CHLORIDE SERPL-SCNC: 107 MMOL/L — SIGNIFICANT CHANGE UP (ref 98–110)
CO2 SERPL-SCNC: 19 MMOL/L — SIGNIFICANT CHANGE UP (ref 17–32)
CO2 SERPL-SCNC: 19 MMOL/L — SIGNIFICANT CHANGE UP (ref 17–32)
CO2 SERPL-SCNC: 22 MMOL/L — SIGNIFICANT CHANGE UP (ref 17–32)
CREAT SERPL-MCNC: 1.4 MG/DL — SIGNIFICANT CHANGE UP (ref 0.7–1.5)
CREAT SERPL-MCNC: 1.5 MG/DL — SIGNIFICANT CHANGE UP (ref 0.7–1.5)
CREAT SERPL-MCNC: 1.5 MG/DL — SIGNIFICANT CHANGE UP (ref 0.7–1.5)
EGFR: 36 ML/MIN/1.73M2 — LOW
EGFR: 36 ML/MIN/1.73M2 — LOW
EGFR: 39 ML/MIN/1.73M2 — LOW
EOSINOPHIL # BLD AUTO: 0.18 K/UL — SIGNIFICANT CHANGE UP (ref 0–0.7)
EOSINOPHIL # BLD AUTO: 0.18 K/UL — SIGNIFICANT CHANGE UP (ref 0–0.7)
EOSINOPHIL NFR BLD AUTO: 1.8 % — SIGNIFICANT CHANGE UP (ref 0–8)
EOSINOPHIL NFR BLD AUTO: 1.8 % — SIGNIFICANT CHANGE UP (ref 0–8)
GAS PNL BLDV: 134 MMOL/L — LOW (ref 136–145)
GAS PNL BLDV: 134 MMOL/L — LOW (ref 136–145)
GAS PNL BLDV: SIGNIFICANT CHANGE UP
GIANT PLATELETS BLD QL SMEAR: PRESENT — SIGNIFICANT CHANGE UP
GIANT PLATELETS BLD QL SMEAR: PRESENT — SIGNIFICANT CHANGE UP
GLUCOSE BLDC GLUCOMTR-MCNC: 102 MG/DL — HIGH (ref 70–99)
GLUCOSE BLDC GLUCOMTR-MCNC: 102 MG/DL — HIGH (ref 70–99)
GLUCOSE BLDC GLUCOMTR-MCNC: 214 MG/DL — HIGH (ref 70–99)
GLUCOSE BLDC GLUCOMTR-MCNC: 214 MG/DL — HIGH (ref 70–99)
GLUCOSE BLDC GLUCOMTR-MCNC: 64 MG/DL — LOW (ref 70–99)
GLUCOSE BLDC GLUCOMTR-MCNC: 64 MG/DL — LOW (ref 70–99)
GLUCOSE SERPL-MCNC: 187 MG/DL — HIGH (ref 70–99)
GLUCOSE SERPL-MCNC: 187 MG/DL — HIGH (ref 70–99)
GLUCOSE SERPL-MCNC: 380 MG/DL — HIGH (ref 70–99)
GLUCOSE SERPL-MCNC: 380 MG/DL — HIGH (ref 70–99)
GLUCOSE SERPL-MCNC: 409 MG/DL — HIGH (ref 70–99)
GLUCOSE SERPL-MCNC: 409 MG/DL — HIGH (ref 70–99)
HCO3 BLDV-SCNC: 22 MMOL/L — SIGNIFICANT CHANGE UP (ref 22–29)
HCO3 BLDV-SCNC: 22 MMOL/L — SIGNIFICANT CHANGE UP (ref 22–29)
HCT VFR BLD CALC: 20.4 % — LOW (ref 37–47)
HCT VFR BLD CALC: 20.4 % — LOW (ref 37–47)
HCT VFR BLD CALC: 24.1 % — LOW (ref 37–47)
HCT VFR BLD CALC: 24.1 % — LOW (ref 37–47)
HGB BLD-MCNC: 6.6 G/DL — CRITICAL LOW (ref 12–16)
HGB BLD-MCNC: 6.6 G/DL — CRITICAL LOW (ref 12–16)
HGB BLD-MCNC: 8.1 G/DL — LOW (ref 12–16)
HGB BLD-MCNC: 8.1 G/DL — LOW (ref 12–16)
LACTATE BLDV-MCNC: 3.1 MMOL/L — HIGH (ref 0.5–2)
LACTATE BLDV-MCNC: 3.1 MMOL/L — HIGH (ref 0.5–2)
LYMPHOCYTES # BLD AUTO: 0.95 K/UL — LOW (ref 1.2–3.4)
LYMPHOCYTES # BLD AUTO: 0.95 K/UL — LOW (ref 1.2–3.4)
LYMPHOCYTES # BLD AUTO: 9.7 % — LOW (ref 20.5–51.1)
LYMPHOCYTES # BLD AUTO: 9.7 % — LOW (ref 20.5–51.1)
MACROCYTES BLD QL: SLIGHT — SIGNIFICANT CHANGE UP
MACROCYTES BLD QL: SLIGHT — SIGNIFICANT CHANGE UP
MANUAL SMEAR VERIFICATION: SIGNIFICANT CHANGE UP
MANUAL SMEAR VERIFICATION: SIGNIFICANT CHANGE UP
MCHC RBC-ENTMCNC: 29.6 PG — SIGNIFICANT CHANGE UP (ref 27–31)
MCHC RBC-ENTMCNC: 29.6 PG — SIGNIFICANT CHANGE UP (ref 27–31)
MCHC RBC-ENTMCNC: 29.7 PG — SIGNIFICANT CHANGE UP (ref 27–31)
MCHC RBC-ENTMCNC: 29.7 PG — SIGNIFICANT CHANGE UP (ref 27–31)
MCHC RBC-ENTMCNC: 32.4 G/DL — SIGNIFICANT CHANGE UP (ref 32–37)
MCHC RBC-ENTMCNC: 32.4 G/DL — SIGNIFICANT CHANGE UP (ref 32–37)
MCHC RBC-ENTMCNC: 33.6 G/DL — SIGNIFICANT CHANGE UP (ref 32–37)
MCHC RBC-ENTMCNC: 33.6 G/DL — SIGNIFICANT CHANGE UP (ref 32–37)
MCV RBC AUTO: 88 FL — SIGNIFICANT CHANGE UP (ref 81–99)
MCV RBC AUTO: 88 FL — SIGNIFICANT CHANGE UP (ref 81–99)
MCV RBC AUTO: 91.9 FL — SIGNIFICANT CHANGE UP (ref 81–99)
MCV RBC AUTO: 91.9 FL — SIGNIFICANT CHANGE UP (ref 81–99)
METAMYELOCYTES # FLD: 1.8 % — HIGH (ref 0–0)
METAMYELOCYTES # FLD: 1.8 % — HIGH (ref 0–0)
MICROCYTES BLD QL: SIGNIFICANT CHANGE UP
MICROCYTES BLD QL: SIGNIFICANT CHANGE UP
MONOCYTES # BLD AUTO: 0.26 K/UL — SIGNIFICANT CHANGE UP (ref 0.1–0.6)
MONOCYTES # BLD AUTO: 0.26 K/UL — SIGNIFICANT CHANGE UP (ref 0.1–0.6)
MONOCYTES NFR BLD AUTO: 2.7 % — SIGNIFICANT CHANGE UP (ref 1.7–9.3)
MONOCYTES NFR BLD AUTO: 2.7 % — SIGNIFICANT CHANGE UP (ref 1.7–9.3)
NEUTROPHILS # BLD AUTO: 7.94 K/UL — HIGH (ref 1.4–6.5)
NEUTROPHILS # BLD AUTO: 7.94 K/UL — HIGH (ref 1.4–6.5)
NEUTROPHILS NFR BLD AUTO: 81.4 % — HIGH (ref 42.2–75.2)
NEUTROPHILS NFR BLD AUTO: 81.4 % — HIGH (ref 42.2–75.2)
NRBC # BLD: 0 /100 WBCS — SIGNIFICANT CHANGE UP (ref 0–0)
NRBC # BLD: 0 /100 WBCS — SIGNIFICANT CHANGE UP (ref 0–0)
OVALOCYTES BLD QL SMEAR: SLIGHT — SIGNIFICANT CHANGE UP
OVALOCYTES BLD QL SMEAR: SLIGHT — SIGNIFICANT CHANGE UP
PCO2 BLDV: 48 MMHG — HIGH (ref 39–42)
PCO2 BLDV: 48 MMHG — HIGH (ref 39–42)
PH BLDV: 7.27 — LOW (ref 7.32–7.43)
PH BLDV: 7.27 — LOW (ref 7.32–7.43)
PLAT MORPH BLD: NORMAL — SIGNIFICANT CHANGE UP
PLAT MORPH BLD: NORMAL — SIGNIFICANT CHANGE UP
PLATELET # BLD AUTO: 458 K/UL — HIGH (ref 130–400)
PLATELET # BLD AUTO: 458 K/UL — HIGH (ref 130–400)
PLATELET # BLD AUTO: 505 K/UL — HIGH (ref 130–400)
PLATELET # BLD AUTO: 505 K/UL — HIGH (ref 130–400)
PMV BLD: 10.2 FL — SIGNIFICANT CHANGE UP (ref 7.4–10.4)
PMV BLD: 10.2 FL — SIGNIFICANT CHANGE UP (ref 7.4–10.4)
PMV BLD: 10.5 FL — HIGH (ref 7.4–10.4)
PMV BLD: 10.5 FL — HIGH (ref 7.4–10.4)
PO2 BLDV: 33 MMHG — SIGNIFICANT CHANGE UP (ref 25–45)
PO2 BLDV: 33 MMHG — SIGNIFICANT CHANGE UP (ref 25–45)
POIKILOCYTOSIS BLD QL AUTO: SLIGHT — SIGNIFICANT CHANGE UP
POIKILOCYTOSIS BLD QL AUTO: SLIGHT — SIGNIFICANT CHANGE UP
POLYCHROMASIA BLD QL SMEAR: SIGNIFICANT CHANGE UP
POLYCHROMASIA BLD QL SMEAR: SIGNIFICANT CHANGE UP
POTASSIUM SERPL-MCNC: 6.1 MMOL/L — CRITICAL HIGH (ref 3.5–5)
POTASSIUM SERPL-MCNC: 6.1 MMOL/L — CRITICAL HIGH (ref 3.5–5)
POTASSIUM SERPL-MCNC: 6.4 MMOL/L — CRITICAL HIGH (ref 3.5–5)
POTASSIUM SERPL-MCNC: 6.4 MMOL/L — CRITICAL HIGH (ref 3.5–5)
POTASSIUM SERPL-MCNC: 6.5 MMOL/L — CRITICAL HIGH (ref 3.5–5)
POTASSIUM SERPL-MCNC: 6.5 MMOL/L — CRITICAL HIGH (ref 3.5–5)
POTASSIUM SERPL-SCNC: 6.1 MMOL/L — CRITICAL HIGH (ref 3.5–5)
POTASSIUM SERPL-SCNC: 6.1 MMOL/L — CRITICAL HIGH (ref 3.5–5)
POTASSIUM SERPL-SCNC: 6.4 MMOL/L — CRITICAL HIGH (ref 3.5–5)
POTASSIUM SERPL-SCNC: 6.4 MMOL/L — CRITICAL HIGH (ref 3.5–5)
POTASSIUM SERPL-SCNC: 6.5 MMOL/L — CRITICAL HIGH (ref 3.5–5)
POTASSIUM SERPL-SCNC: 6.5 MMOL/L — CRITICAL HIGH (ref 3.5–5)
PROT SERPL-MCNC: 6.3 G/DL — SIGNIFICANT CHANGE UP (ref 6–8)
PROT SERPL-MCNC: 6.3 G/DL — SIGNIFICANT CHANGE UP (ref 6–8)
RBC # BLD: 2.22 M/UL — LOW (ref 4.2–5.4)
RBC # BLD: 2.22 M/UL — LOW (ref 4.2–5.4)
RBC # BLD: 2.74 M/UL — LOW (ref 4.2–5.4)
RBC # BLD: 2.74 M/UL — LOW (ref 4.2–5.4)
RBC # FLD: 18.1 % — HIGH (ref 11.5–14.5)
RBC # FLD: 18.1 % — HIGH (ref 11.5–14.5)
RBC # FLD: 18.2 % — HIGH (ref 11.5–14.5)
RBC # FLD: 18.2 % — HIGH (ref 11.5–14.5)
RBC BLD AUTO: ABNORMAL
RBC BLD AUTO: ABNORMAL
SAO2 % BLDV: 44.5 % — LOW (ref 67–88)
SAO2 % BLDV: 44.5 % — LOW (ref 67–88)
SMUDGE CELLS # BLD: PRESENT — SIGNIFICANT CHANGE UP
SMUDGE CELLS # BLD: PRESENT — SIGNIFICANT CHANGE UP
SODIUM SERPL-SCNC: 132 MMOL/L — LOW (ref 135–146)
SODIUM SERPL-SCNC: 132 MMOL/L — LOW (ref 135–146)
SODIUM SERPL-SCNC: 133 MMOL/L — LOW (ref 135–146)
SODIUM SERPL-SCNC: 133 MMOL/L — LOW (ref 135–146)
SODIUM SERPL-SCNC: 138 MMOL/L — SIGNIFICANT CHANGE UP (ref 135–146)
SODIUM SERPL-SCNC: 138 MMOL/L — SIGNIFICANT CHANGE UP (ref 135–146)
WBC # BLD: 10.36 K/UL — SIGNIFICANT CHANGE UP (ref 4.8–10.8)
WBC # BLD: 10.36 K/UL — SIGNIFICANT CHANGE UP (ref 4.8–10.8)
WBC # BLD: 9.75 K/UL — SIGNIFICANT CHANGE UP (ref 4.8–10.8)
WBC # BLD: 9.75 K/UL — SIGNIFICANT CHANGE UP (ref 4.8–10.8)
WBC # FLD AUTO: 10.36 K/UL — SIGNIFICANT CHANGE UP (ref 4.8–10.8)
WBC # FLD AUTO: 10.36 K/UL — SIGNIFICANT CHANGE UP (ref 4.8–10.8)
WBC # FLD AUTO: 9.75 K/UL — SIGNIFICANT CHANGE UP (ref 4.8–10.8)
WBC # FLD AUTO: 9.75 K/UL — SIGNIFICANT CHANGE UP (ref 4.8–10.8)

## 2023-12-14 PROCEDURE — 85027 COMPLETE CBC AUTOMATED: CPT

## 2023-12-14 PROCEDURE — 80053 COMPREHEN METABOLIC PANEL: CPT

## 2023-12-14 PROCEDURE — 85025 COMPLETE CBC W/AUTO DIFF WBC: CPT

## 2023-12-14 PROCEDURE — 82962 GLUCOSE BLOOD TEST: CPT

## 2023-12-14 PROCEDURE — 83735 ASSAY OF MAGNESIUM: CPT

## 2023-12-14 PROCEDURE — 99222 1ST HOSP IP/OBS MODERATE 55: CPT

## 2023-12-14 PROCEDURE — 99285 EMERGENCY DEPT VISIT HI MDM: CPT

## 2023-12-14 PROCEDURE — 83036 HEMOGLOBIN GLYCOSYLATED A1C: CPT

## 2023-12-14 PROCEDURE — P9040: CPT

## 2023-12-14 PROCEDURE — 36430 TRANSFUSION BLD/BLD COMPNT: CPT

## 2023-12-14 PROCEDURE — 80048 BASIC METABOLIC PNL TOTAL CA: CPT

## 2023-12-14 PROCEDURE — G0378: CPT

## 2023-12-14 PROCEDURE — 36415 COLL VENOUS BLD VENIPUNCTURE: CPT

## 2023-12-14 RX ORDER — INSULIN LISPRO 100/ML
8 VIAL (ML) SUBCUTANEOUS
Refills: 0 | Status: DISCONTINUED | OUTPATIENT
Start: 2023-12-14 | End: 2023-12-16

## 2023-12-14 RX ORDER — PANTOPRAZOLE SODIUM 20 MG/1
40 TABLET, DELAYED RELEASE ORAL
Refills: 0 | Status: DISCONTINUED | OUTPATIENT
Start: 2023-12-14 | End: 2023-12-16

## 2023-12-14 RX ORDER — GLUCAGON INJECTION, SOLUTION 0.5 MG/.1ML
1 INJECTION, SOLUTION SUBCUTANEOUS ONCE
Refills: 0 | Status: DISCONTINUED | OUTPATIENT
Start: 2023-12-14 | End: 2023-12-16

## 2023-12-14 RX ORDER — SODIUM ZIRCONIUM CYCLOSILICATE 10 G/10G
10 POWDER, FOR SUSPENSION ORAL ONCE
Refills: 0 | Status: COMPLETED | OUTPATIENT
Start: 2023-12-14 | End: 2023-12-14

## 2023-12-14 RX ORDER — DEXTROSE 50 % IN WATER 50 %
25 SYRINGE (ML) INTRAVENOUS ONCE
Refills: 0 | Status: DISCONTINUED | OUTPATIENT
Start: 2023-12-14 | End: 2023-12-16

## 2023-12-14 RX ORDER — SODIUM ZIRCONIUM CYCLOSILICATE 10 G/10G
5 POWDER, FOR SUSPENSION ORAL ONCE
Refills: 0 | Status: COMPLETED | OUTPATIENT
Start: 2023-12-14 | End: 2023-12-14

## 2023-12-14 RX ORDER — ASPIRIN/CALCIUM CARB/MAGNESIUM 324 MG
1 TABLET ORAL
Qty: 0 | Refills: 0 | DISCHARGE

## 2023-12-14 RX ORDER — HEPARIN SODIUM 5000 [USP'U]/ML
5000 INJECTION INTRAVENOUS; SUBCUTANEOUS EVERY 12 HOURS
Refills: 0 | Status: DISCONTINUED | OUTPATIENT
Start: 2023-12-14 | End: 2023-12-14

## 2023-12-14 RX ORDER — INSULIN HUMAN 100 [IU]/ML
6 INJECTION, SOLUTION SUBCUTANEOUS ONCE
Refills: 0 | Status: COMPLETED | OUTPATIENT
Start: 2023-12-14 | End: 2023-12-14

## 2023-12-14 RX ORDER — SODIUM CHLORIDE 9 MG/ML
1000 INJECTION, SOLUTION INTRAVENOUS
Refills: 0 | Status: DISCONTINUED | OUTPATIENT
Start: 2023-12-14 | End: 2023-12-16

## 2023-12-14 RX ORDER — NIFEDIPINE 30 MG
60 TABLET, EXTENDED RELEASE 24 HR ORAL DAILY
Refills: 0 | Status: DISCONTINUED | OUTPATIENT
Start: 2023-12-14 | End: 2023-12-16

## 2023-12-14 RX ORDER — DEXTROSE 50 % IN WATER 50 %
15 SYRINGE (ML) INTRAVENOUS ONCE
Refills: 0 | Status: DISCONTINUED | OUTPATIENT
Start: 2023-12-14 | End: 2023-12-16

## 2023-12-14 RX ORDER — PANTOPRAZOLE SODIUM 20 MG/1
40 TABLET, DELAYED RELEASE ORAL
Refills: 0 | Status: DISCONTINUED | OUTPATIENT
Start: 2023-12-14 | End: 2023-12-14

## 2023-12-14 RX ORDER — DEXTROSE 50 % IN WATER 50 %
12.5 SYRINGE (ML) INTRAVENOUS ONCE
Refills: 0 | Status: DISCONTINUED | OUTPATIENT
Start: 2023-12-14 | End: 2023-12-16

## 2023-12-14 RX ORDER — INSULIN GLARGINE 100 [IU]/ML
10 INJECTION, SOLUTION SUBCUTANEOUS AT BEDTIME
Refills: 0 | Status: DISCONTINUED | OUTPATIENT
Start: 2023-12-14 | End: 2023-12-16

## 2023-12-14 RX ORDER — INSULIN HUMAN 100 [IU]/ML
6 INJECTION, SOLUTION SUBCUTANEOUS ONCE
Refills: 0 | Status: DISCONTINUED | OUTPATIENT
Start: 2023-12-14 | End: 2023-12-14

## 2023-12-14 RX ADMIN — SODIUM ZIRCONIUM CYCLOSILICATE 5 GRAM(S): 10 POWDER, FOR SUSPENSION ORAL at 13:31

## 2023-12-14 RX ADMIN — SODIUM ZIRCONIUM CYCLOSILICATE 10 GRAM(S): 10 POWDER, FOR SUSPENSION ORAL at 20:58

## 2023-12-14 RX ADMIN — Medication 8 UNIT(S): at 17:19

## 2023-12-14 RX ADMIN — INSULIN HUMAN 6 UNIT(S): 100 INJECTION, SOLUTION SUBCUTANEOUS at 13:35

## 2023-12-14 RX ADMIN — PANTOPRAZOLE SODIUM 40 MILLIGRAM(S): 20 TABLET, DELAYED RELEASE ORAL at 17:20

## 2023-12-14 NOTE — H&P ADULT - ATTENDING COMMENTS
78 F with MDS, DM, HTN, CKD3 was sent for evaluation of low hemoglobin    #acute on chronic normocytic anemia gets transfusion every 3 weeks.  #Hyper K  #lactic acidosic   #CKD3a  #DM w hyperglycemia     Plans:    - pt w chronic anemia, get transfusion q 3weeks  - no evidence of active bleeding  - give 1 unit of PRBC, repeat later today  - s/p inuslin, s/p loklema, repeat level, tele monitor for now  - repeat lactic acid level   - inuslin to target -180, lantus 10, lispro 8/8/8  - resume home meds 78 F with MDS, DM, HTN, CKD3 was sent for evaluation of low hemoglobin    #acute on chronic normocytic anemia gets transfusion every 3 weeks.  #Hyper K  #lactic acidosic   #CKD3a  #DM w hyperglycemia     Plans:    - pt w chronic anemia, get transfusion q 3weeks  - reports black stool, KAREN w wmpty valut, no evidence of active bleeding  - give 1 unit of PRBC, repeat later today  - PPI BID, if hbg drop or evidence of melena then make NPO and call GI for EGD   - s/p inuslin, s/p loklema, repeat level, tele monitor for now  - repeat lactic acid level   - insulin to target -180, lantus 10, lispro 8/8/8  - resume home meds  - dvt ppx w SCD

## 2023-12-14 NOTE — ED PROVIDER NOTE - OBJECTIVE STATEMENT
78-year-old female with past medical history of myelodysplastic syndrome, diabetes mellitus who presents for low hemoglobin.  States she had labs drawn yesterday, hemoglobin was 6.6.  She gets blood transfusions roughly every 3 weeks.  Follows with Dr. Temple for oncology.  Currently not on any chemotherapy.  Currently asymptomatic.  Denies fevers, chills, chest pain, shortness breath, nausea, vomiting, diarrhea, bloody stools, abdominal pain, weakness, numbness, fatigue.

## 2023-12-14 NOTE — ED PROVIDER NOTE - CLINICAL SUMMARY MEDICAL DECISION MAKING FREE TEXT BOX
78 female history of MDS diabetes hypertension CKD presents for eval of low hemoglobin.  Patient reports he had outpatient labs demonstrating hemoglobin of 6.6.  She feels weak and feels like she needs a transfusion.  Here patient found to be anemic however patient also found to be hyperkalemic with a potassium of 6.5.  Patient was given insulin and Lokelma admitted for further evaluation.  EKG normal QRS. Lastly, pt also transfused

## 2023-12-14 NOTE — H&P ADULT - NSHPREVIEWOFSYSTEMS_GEN_ALL_CORE

## 2023-12-14 NOTE — ED ADULT NURSE NOTE - NSFALLUNIVINTERV_ED_ALL_ED
Bed/Stretcher in lowest position, wheels locked, appropriate side rails in place/Call bell, personal items and telephone in reach/Instruct patient to call for assistance before getting out of bed/chair/stretcher/Non-slip footwear applied when patient is off stretcher/[place] to call system/Physically safe environment - no spills, clutter or unnecessary equipment/Purposeful proactive rounding/Room/bathroom lighting operational, light cord in reach Bed/Stretcher in lowest position, wheels locked, appropriate side rails in place/Call bell, personal items and telephone in reach/Instruct patient to call for assistance before getting out of bed/chair/stretcher/Non-slip footwear applied when patient is off stretcher/[place] to call system/Physically safe environment - no spills, clutter or unnecessary equipment/Purposeful proactive rounding/Room/bathroom lighting operational, light cord in reach

## 2023-12-14 NOTE — H&P ADULT - HISTORY OF PRESENT ILLNESS
78 F with PMHx of MDS, DM, HTN, CKD3 presents for low hemoglobin.  States she had labs drawn yesterday, hemoglobin was 6.6.  She gets blood transfusions roughly every 3 weeks.  Follows with Dr. Temple for oncology.  Currently not on any chemotherapy.  Currently asymptomatic.     hg 6.6, mcv 91.9, plat count 505, neutrophil % 81.4, basophil % 2.6, metamyelocytes % 1.8, na 132, k 6.5, bun 44, cr 1.5, glc 409, no anion gap, gfr 36  VBG: ph 7.27, pco2 48, bicarb 22, lactate 3.1    ECG: normal sinus rhythm     temp 98.3, hr 95, bp 158/70, rr 20, spo2 100    received 2 units prbc, 6 units regular insulin and lokelma 5 g 78 F with PMHx of MDS, DM, HTN, CKD3 presents for low hemoglobin.  States she had labs drawn yesterday, hemoglobin was 6.6.  She gets blood transfusions roughly every 3 weeks.  Follows with Dr. Temple for oncology.  Currently not on any chemotherapy.  Currently asymptomatic. she reports her baseline hg is aprox 7. her ros is neg.     hg 6.6, mcv 91.9, plat count 505, neutrophil % 81.4, basophil % 2.6, metamyelocytes % 1.8, na 132, k 6.5, bun 44, cr 1.5, glc 409, no anion gap, gfr 36  VBG: ph 7.27, pco2 48, bicarb 22, lactate 3.1    ECG: normal sinus rhythm     temp 98.3, hr 95, bp 158/70, rr 20, spo2 100    ordered 2 units prbc, 6 units regular insulin and lokelma 5 g in ED.  78 F with PMHx of MDS, DM, HTN, CKD3 presents for low hemoglobin.  States she had labs drawn yesterday, hemoglobin was 6.6.  She gets blood transfusions roughly every 3 weeks.  Follows with Dr. Temple for oncology.  Currently not on any chemotherapy.  Currently asymptomatic. she reports her baseline hg is aprox 7. she reports dark stool for past 2 weeks with diarrhea. rest of ros is neg.     hg 6.6, mcv 91.9, plat count 505, neutrophil % 81.4, basophil % 2.6, metamyelocytes % 1.8, na 132, k 6.5, bun 44, cr 1.5, glc 409, no anion gap, gfr 36  VBG: ph 7.27, pco2 48, bicarb 22, lactate 3.1    ECG: normal sinus rhythm     temp 98.3, hr 95, bp 158/70, rr 20, spo2 100    ordered 2 units prbc, 6 units regular insulin and lokelma 5 g in ED.

## 2023-12-14 NOTE — H&P ADULT - ASSESSMENT
78 F with PMHx of MDS, DM, HTN, CKD3 presents for low hemoglobin.  States she had labs drawn yesterday, hemoglobin was 6.6.  She gets blood transfusions roughly every 3 weeks.  Follows with Dr. Temple for oncology.  Currently not on any chemotherapy.  Currently asymptomatic. she reports her baseline hg is aprox 7. her ros is neg.     hg 6.6, mcv 91.9, plat count 505, neutrophil % 81.4, basophil % 2.6, metamyelocytes % 1.8, na 132, k 6.5, bun 44, cr 1.5, glc 409, no anion gap, gfr 36  VBG: ph 7.27, pco2 48, bicarb 22, lactate 3.1    ECG: normal sinus rhythm     temp 98.3, hr 95, bp 158/70, rr 20, spo2 100    ordered 2 units prbc, 6 units regular insulin and lokelma 5 g in ED.     #anemia 2/2 MDS  #lactic acidosis   -hg on admission 6.6  -ed ordered 2 units prbc  -f/u repeat cbc and vbg 8PM    #hyperkalemia  -ED ordered insulin and lokelma  -f/u bmp 4 PM  -tele for now    #DM  -10/8/8/8 insulin  -sliding scale    DVT PPX: hep  GI PPX: Protonix  Diet:dash , ccd   78 F with PMHx of MDS, DM, HTN, CKD3 presents for low hemoglobin.  States she had labs drawn yesterday, hemoglobin was 6.6.  She gets blood transfusions roughly every 3 weeks.  Follows with Dr. Temple for oncology.  Currently not on any chemotherapy.  Currently asymptomatic. she reports her baseline hg is aprox 7. her ros is neg.     hg 6.6, mcv 91.9, plat count 505, neutrophil % 81.4, basophil % 2.6, metamyelocytes % 1.8, na 132, k 6.5, bun 44, cr 1.5, glc 409, no anion gap, gfr 36  VBG: ph 7.27, pco2 48, bicarb 22, lactate 3.1    ECG: normal sinus rhythm     temp 98.3, hr 95, bp 158/70, rr 20, spo2 100    ordered 2 units prbc, 6 units regular insulin and lokelma 5 g in ED.     #anemia 2/2 MDS  #lactic acidosis  #reported dark stool over past 2 weeks  -hg on admission 6.6  -KAREN negative   -ed ordered 2 units prbc  -f/u repeat cbc and vbg 8PM  -protonix bid  -if hg does not improve appropriately after prbc or episode of melena occurs. consult GI and make patient npo.    #hyperkalemia  -ED ordered insulin and lokelma  -f/u bmp 4 PM  -tele for now    #DM  -10/8/8/8 insulin  -sliding scale    DVT PPX: hep  GI PPX: Protonix  Diet:dash , ccd

## 2023-12-14 NOTE — H&P ADULT - NSHPLABSRESULTS_GEN_ALL_CORE
6.6    9.75  )-----------( 505      ( 14 Dec 2023 10:04 )             20.4       12-14    133<L>  |  102  |  44<H>  ----------------------------<  380<H>  6.4<HH>   |  22  |  1.4    Ca    9.1      14 Dec 2023 11:34    TPro  6.3  /  Alb  4.5  /  TBili  0.7  /  DBili  x   /  AST  13  /  ALT  14  /  AlkPhos  108  12-14

## 2023-12-14 NOTE — ED PROVIDER NOTE - CARE PLAN
1 Principal Discharge DX:	Low hemoglobin   Principal Discharge DX:	Low hemoglobin  Secondary Diagnosis:	Hyperkalemia  Secondary Diagnosis:	Hyperglycemia

## 2023-12-14 NOTE — H&P ADULT - NSHPPHYSICALEXAM_GEN_ALL_CORE
LOS:     VITALS:   T(C): 36.8 (12-14-23 @ 09:23), Max: 36.8 (12-14-23 @ 09:23)  HR: 95 (12-14-23 @ 09:23) (95 - 95)  BP: 158/70 (12-14-23 @ 09:23) (158/70 - 158/70)  RR: 20 (12-14-23 @ 09:23) (20 - 20)  SpO2: 100% (12-14-23 @ 09:23) (100% - 100%)    GENERAL: NAD, lying in bed comfortably  HEAD:  Atraumatic, Normocephalic  EYES: EOMI, PERRLA, conjunctiva and sclera clear  ENT: Moist mucous membranes  NECK: Supple, No JVD  CHEST/LUNG: Clear to auscultation bilaterally; No rales, rhonchi, wheezing, or rubs. Unlabored respirations  HEART: Regular rate and rhythm; No murmurs, rubs, or gallops  ABDOMEN: BSx4; Soft, nontender, nondistended  EXTREMITIES: no edema   NERVOUS SYSTEM:  A&Ox3,  SKIN: No rashes or lesions LOS:     VITALS:   T(C): 36.8 (12-14-23 @ 09:23), Max: 36.8 (12-14-23 @ 09:23)  HR: 95 (12-14-23 @ 09:23) (95 - 95)  BP: 158/70 (12-14-23 @ 09:23) (158/70 - 158/70)  RR: 20 (12-14-23 @ 09:23) (20 - 20)  SpO2: 100% (12-14-23 @ 09:23) (100% - 100%)    GENERAL: NAD, lying in bed comfortably  HEAD:  Atraumatic, Normocephalic  EYES: EOMI, PERRLA, conjunctiva and sclera clear  ENT: Moist mucous membranes  NECK: Supple, No JVD  CHEST/LUNG: Clear to auscultation bilaterally; No rales, rhonchi, wheezing, or rubs. Unlabored respirations  HEART: Regular rate and rhythm; No murmurs, rubs, or gallops  ABDOMEN / GI: KAREN negative. (supervised by Judit Phillips RN) . abd soft and nontender  EXTREMITIES: no edema   NERVOUS SYSTEM:  A&Ox3,  SKIN: No rashes or lesions

## 2023-12-15 ENCOUNTER — NON-APPOINTMENT (OUTPATIENT)
Age: 78
End: 2023-12-15

## 2023-12-15 LAB
A1C WITH ESTIMATED AVERAGE GLUCOSE RESULT: 6.9 % — HIGH (ref 4–5.6)
A1C WITH ESTIMATED AVERAGE GLUCOSE RESULT: 6.9 % — HIGH (ref 4–5.6)
ALBUMIN SERPL ELPH-MCNC: 3.8 G/DL — SIGNIFICANT CHANGE UP (ref 3.5–5.2)
ALBUMIN SERPL ELPH-MCNC: 3.8 G/DL — SIGNIFICANT CHANGE UP (ref 3.5–5.2)
ALP SERPL-CCNC: 94 U/L — SIGNIFICANT CHANGE UP (ref 30–115)
ALP SERPL-CCNC: 94 U/L — SIGNIFICANT CHANGE UP (ref 30–115)
ALT FLD-CCNC: 14 U/L — SIGNIFICANT CHANGE UP (ref 0–41)
ALT FLD-CCNC: 14 U/L — SIGNIFICANT CHANGE UP (ref 0–41)
ANION GAP SERPL CALC-SCNC: 10 MMOL/L — SIGNIFICANT CHANGE UP (ref 7–14)
ANION GAP SERPL CALC-SCNC: 14 MMOL/L — SIGNIFICANT CHANGE UP (ref 7–14)
ANION GAP SERPL CALC-SCNC: 14 MMOL/L — SIGNIFICANT CHANGE UP (ref 7–14)
AST SERPL-CCNC: 15 U/L — SIGNIFICANT CHANGE UP (ref 0–41)
AST SERPL-CCNC: 15 U/L — SIGNIFICANT CHANGE UP (ref 0–41)
BASOPHILS # BLD AUTO: 0.14 K/UL — SIGNIFICANT CHANGE UP (ref 0–0.2)
BASOPHILS # BLD AUTO: 0.14 K/UL — SIGNIFICANT CHANGE UP (ref 0–0.2)
BASOPHILS NFR BLD AUTO: 1.8 % — HIGH (ref 0–1)
BASOPHILS NFR BLD AUTO: 1.8 % — HIGH (ref 0–1)
BILIRUB SERPL-MCNC: 0.9 MG/DL — SIGNIFICANT CHANGE UP (ref 0.2–1.2)
BILIRUB SERPL-MCNC: 0.9 MG/DL — SIGNIFICANT CHANGE UP (ref 0.2–1.2)
BUN SERPL-MCNC: 46 MG/DL — HIGH (ref 10–20)
BUN SERPL-MCNC: 47 MG/DL — HIGH (ref 10–20)
BUN SERPL-MCNC: 47 MG/DL — HIGH (ref 10–20)
CALCIUM SERPL-MCNC: 8.7 MG/DL — SIGNIFICANT CHANGE UP (ref 8.4–10.5)
CALCIUM SERPL-MCNC: 8.7 MG/DL — SIGNIFICANT CHANGE UP (ref 8.4–10.5)
CALCIUM SERPL-MCNC: 8.8 MG/DL — SIGNIFICANT CHANGE UP (ref 8.4–10.5)
CALCIUM SERPL-MCNC: 8.8 MG/DL — SIGNIFICANT CHANGE UP (ref 8.4–10.5)
CALCIUM SERPL-MCNC: 9.1 MG/DL — SIGNIFICANT CHANGE UP (ref 8.4–10.5)
CALCIUM SERPL-MCNC: 9.1 MG/DL — SIGNIFICANT CHANGE UP (ref 8.4–10.5)
CHLORIDE SERPL-SCNC: 103 MMOL/L — SIGNIFICANT CHANGE UP (ref 98–110)
CHLORIDE SERPL-SCNC: 103 MMOL/L — SIGNIFICANT CHANGE UP (ref 98–110)
CHLORIDE SERPL-SCNC: 107 MMOL/L — SIGNIFICANT CHANGE UP (ref 98–110)
CHLORIDE SERPL-SCNC: 107 MMOL/L — SIGNIFICANT CHANGE UP (ref 98–110)
CHLORIDE SERPL-SCNC: 108 MMOL/L — SIGNIFICANT CHANGE UP (ref 98–110)
CHLORIDE SERPL-SCNC: 108 MMOL/L — SIGNIFICANT CHANGE UP (ref 98–110)
CO2 SERPL-SCNC: 19 MMOL/L — SIGNIFICANT CHANGE UP (ref 17–32)
CO2 SERPL-SCNC: 19 MMOL/L — SIGNIFICANT CHANGE UP (ref 17–32)
CO2 SERPL-SCNC: 21 MMOL/L — SIGNIFICANT CHANGE UP (ref 17–32)
CO2 SERPL-SCNC: 21 MMOL/L — SIGNIFICANT CHANGE UP (ref 17–32)
CO2 SERPL-SCNC: 22 MMOL/L — SIGNIFICANT CHANGE UP (ref 17–32)
CO2 SERPL-SCNC: 22 MMOL/L — SIGNIFICANT CHANGE UP (ref 17–32)
CREAT SERPL-MCNC: 1.5 MG/DL — SIGNIFICANT CHANGE UP (ref 0.7–1.5)
CREAT SERPL-MCNC: 1.7 MG/DL — HIGH (ref 0.7–1.5)
CREAT SERPL-MCNC: 1.7 MG/DL — HIGH (ref 0.7–1.5)
EGFR: 31 ML/MIN/1.73M2 — LOW
EGFR: 31 ML/MIN/1.73M2 — LOW
EGFR: 36 ML/MIN/1.73M2 — LOW
EOSINOPHIL # BLD AUTO: 0.21 K/UL — SIGNIFICANT CHANGE UP (ref 0–0.7)
EOSINOPHIL # BLD AUTO: 0.21 K/UL — SIGNIFICANT CHANGE UP (ref 0–0.7)
EOSINOPHIL NFR BLD AUTO: 2.8 % — SIGNIFICANT CHANGE UP (ref 0–8)
EOSINOPHIL NFR BLD AUTO: 2.8 % — SIGNIFICANT CHANGE UP (ref 0–8)
ESTIMATED AVERAGE GLUCOSE: 151 MG/DL — HIGH (ref 68–114)
ESTIMATED AVERAGE GLUCOSE: 151 MG/DL — HIGH (ref 68–114)
GLUCOSE BLDC GLUCOMTR-MCNC: 150 MG/DL — HIGH (ref 70–99)
GLUCOSE BLDC GLUCOMTR-MCNC: 150 MG/DL — HIGH (ref 70–99)
GLUCOSE BLDC GLUCOMTR-MCNC: 203 MG/DL — HIGH (ref 70–99)
GLUCOSE BLDC GLUCOMTR-MCNC: 203 MG/DL — HIGH (ref 70–99)
GLUCOSE BLDC GLUCOMTR-MCNC: 227 MG/DL — HIGH (ref 70–99)
GLUCOSE BLDC GLUCOMTR-MCNC: 227 MG/DL — HIGH (ref 70–99)
GLUCOSE BLDC GLUCOMTR-MCNC: 87 MG/DL — SIGNIFICANT CHANGE UP (ref 70–99)
GLUCOSE BLDC GLUCOMTR-MCNC: 87 MG/DL — SIGNIFICANT CHANGE UP (ref 70–99)
GLUCOSE BLDC GLUCOMTR-MCNC: 93 MG/DL — SIGNIFICANT CHANGE UP (ref 70–99)
GLUCOSE BLDC GLUCOMTR-MCNC: 93 MG/DL — SIGNIFICANT CHANGE UP (ref 70–99)
GLUCOSE SERPL-MCNC: 151 MG/DL — HIGH (ref 70–99)
GLUCOSE SERPL-MCNC: 151 MG/DL — HIGH (ref 70–99)
GLUCOSE SERPL-MCNC: 67 MG/DL — LOW (ref 70–99)
GLUCOSE SERPL-MCNC: 67 MG/DL — LOW (ref 70–99)
GLUCOSE SERPL-MCNC: 70 MG/DL — SIGNIFICANT CHANGE UP (ref 70–99)
GLUCOSE SERPL-MCNC: 70 MG/DL — SIGNIFICANT CHANGE UP (ref 70–99)
HCT VFR BLD CALC: 24.4 % — LOW (ref 37–47)
HCT VFR BLD CALC: 24.4 % — LOW (ref 37–47)
HCT VFR BLD CALC: 26.8 % — LOW (ref 37–47)
HCT VFR BLD CALC: 26.8 % — LOW (ref 37–47)
HGB BLD-MCNC: 7.9 G/DL — LOW (ref 12–16)
HGB BLD-MCNC: 7.9 G/DL — LOW (ref 12–16)
HGB BLD-MCNC: 8.8 G/DL — LOW (ref 12–16)
HGB BLD-MCNC: 8.8 G/DL — LOW (ref 12–16)
IMM GRANULOCYTES NFR BLD AUTO: 4.1 % — HIGH (ref 0.1–0.3)
IMM GRANULOCYTES NFR BLD AUTO: 4.1 % — HIGH (ref 0.1–0.3)
LYMPHOCYTES # BLD AUTO: 1.82 K/UL — SIGNIFICANT CHANGE UP (ref 1.2–3.4)
LYMPHOCYTES # BLD AUTO: 1.82 K/UL — SIGNIFICANT CHANGE UP (ref 1.2–3.4)
LYMPHOCYTES # BLD AUTO: 23.9 % — SIGNIFICANT CHANGE UP (ref 20.5–51.1)
LYMPHOCYTES # BLD AUTO: 23.9 % — SIGNIFICANT CHANGE UP (ref 20.5–51.1)
MAGNESIUM SERPL-MCNC: 1.8 MG/DL — SIGNIFICANT CHANGE UP (ref 1.8–2.4)
MAGNESIUM SERPL-MCNC: 1.8 MG/DL — SIGNIFICANT CHANGE UP (ref 1.8–2.4)
MCHC RBC-ENTMCNC: 28.7 PG — SIGNIFICANT CHANGE UP (ref 27–31)
MCHC RBC-ENTMCNC: 28.7 PG — SIGNIFICANT CHANGE UP (ref 27–31)
MCHC RBC-ENTMCNC: 28.9 PG — SIGNIFICANT CHANGE UP (ref 27–31)
MCHC RBC-ENTMCNC: 28.9 PG — SIGNIFICANT CHANGE UP (ref 27–31)
MCHC RBC-ENTMCNC: 32.4 G/DL — SIGNIFICANT CHANGE UP (ref 32–37)
MCHC RBC-ENTMCNC: 32.4 G/DL — SIGNIFICANT CHANGE UP (ref 32–37)
MCHC RBC-ENTMCNC: 32.8 G/DL — SIGNIFICANT CHANGE UP (ref 32–37)
MCHC RBC-ENTMCNC: 32.8 G/DL — SIGNIFICANT CHANGE UP (ref 32–37)
MCV RBC AUTO: 88.2 FL — SIGNIFICANT CHANGE UP (ref 81–99)
MCV RBC AUTO: 88.2 FL — SIGNIFICANT CHANGE UP (ref 81–99)
MCV RBC AUTO: 88.7 FL — SIGNIFICANT CHANGE UP (ref 81–99)
MCV RBC AUTO: 88.7 FL — SIGNIFICANT CHANGE UP (ref 81–99)
MONOCYTES # BLD AUTO: 1.08 K/UL — HIGH (ref 0.1–0.6)
MONOCYTES # BLD AUTO: 1.08 K/UL — HIGH (ref 0.1–0.6)
MONOCYTES NFR BLD AUTO: 14.2 % — HIGH (ref 1.7–9.3)
MONOCYTES NFR BLD AUTO: 14.2 % — HIGH (ref 1.7–9.3)
NEUTROPHILS # BLD AUTO: 4.07 K/UL — SIGNIFICANT CHANGE UP (ref 1.4–6.5)
NEUTROPHILS # BLD AUTO: 4.07 K/UL — SIGNIFICANT CHANGE UP (ref 1.4–6.5)
NEUTROPHILS NFR BLD AUTO: 53.2 % — SIGNIFICANT CHANGE UP (ref 42.2–75.2)
NEUTROPHILS NFR BLD AUTO: 53.2 % — SIGNIFICANT CHANGE UP (ref 42.2–75.2)
NRBC # BLD: 0 /100 WBCS — SIGNIFICANT CHANGE UP (ref 0–0)
PLATELET # BLD AUTO: 411 K/UL — HIGH (ref 130–400)
PLATELET # BLD AUTO: 411 K/UL — HIGH (ref 130–400)
PLATELET # BLD AUTO: 508 K/UL — HIGH (ref 130–400)
PLATELET # BLD AUTO: 508 K/UL — HIGH (ref 130–400)
PMV BLD: 10.2 FL — SIGNIFICANT CHANGE UP (ref 7.4–10.4)
PMV BLD: 10.2 FL — SIGNIFICANT CHANGE UP (ref 7.4–10.4)
PMV BLD: 10.4 FL — SIGNIFICANT CHANGE UP (ref 7.4–10.4)
PMV BLD: 10.4 FL — SIGNIFICANT CHANGE UP (ref 7.4–10.4)
POTASSIUM SERPL-MCNC: 5.2 MMOL/L — HIGH (ref 3.5–5)
POTASSIUM SERPL-MCNC: 5.2 MMOL/L — HIGH (ref 3.5–5)
POTASSIUM SERPL-MCNC: 5.5 MMOL/L — HIGH (ref 3.5–5)
POTASSIUM SERPL-MCNC: 5.5 MMOL/L — HIGH (ref 3.5–5)
POTASSIUM SERPL-MCNC: 5.8 MMOL/L — HIGH (ref 3.5–5)
POTASSIUM SERPL-MCNC: 5.8 MMOL/L — HIGH (ref 3.5–5)
POTASSIUM SERPL-SCNC: 5.2 MMOL/L — HIGH (ref 3.5–5)
POTASSIUM SERPL-SCNC: 5.2 MMOL/L — HIGH (ref 3.5–5)
POTASSIUM SERPL-SCNC: 5.5 MMOL/L — HIGH (ref 3.5–5)
POTASSIUM SERPL-SCNC: 5.5 MMOL/L — HIGH (ref 3.5–5)
POTASSIUM SERPL-SCNC: 5.8 MMOL/L — HIGH (ref 3.5–5)
POTASSIUM SERPL-SCNC: 5.8 MMOL/L — HIGH (ref 3.5–5)
PROT SERPL-MCNC: 5.8 G/DL — LOW (ref 6–8)
PROT SERPL-MCNC: 5.8 G/DL — LOW (ref 6–8)
RBC # BLD: 2.75 M/UL — LOW (ref 4.2–5.4)
RBC # BLD: 2.75 M/UL — LOW (ref 4.2–5.4)
RBC # BLD: 3.04 M/UL — LOW (ref 4.2–5.4)
RBC # BLD: 3.04 M/UL — LOW (ref 4.2–5.4)
RBC # FLD: 19.1 % — HIGH (ref 11.5–14.5)
RBC # FLD: 19.1 % — HIGH (ref 11.5–14.5)
RBC # FLD: 19.2 % — HIGH (ref 11.5–14.5)
RBC # FLD: 19.2 % — HIGH (ref 11.5–14.5)
SODIUM SERPL-SCNC: 136 MMOL/L — SIGNIFICANT CHANGE UP (ref 135–146)
SODIUM SERPL-SCNC: 136 MMOL/L — SIGNIFICANT CHANGE UP (ref 135–146)
SODIUM SERPL-SCNC: 138 MMOL/L — SIGNIFICANT CHANGE UP (ref 135–146)
SODIUM SERPL-SCNC: 138 MMOL/L — SIGNIFICANT CHANGE UP (ref 135–146)
SODIUM SERPL-SCNC: 140 MMOL/L — SIGNIFICANT CHANGE UP (ref 135–146)
SODIUM SERPL-SCNC: 140 MMOL/L — SIGNIFICANT CHANGE UP (ref 135–146)
WBC # BLD: 7.63 K/UL — SIGNIFICANT CHANGE UP (ref 4.8–10.8)
WBC # BLD: 7.63 K/UL — SIGNIFICANT CHANGE UP (ref 4.8–10.8)
WBC # BLD: 9.05 K/UL — SIGNIFICANT CHANGE UP (ref 4.8–10.8)
WBC # BLD: 9.05 K/UL — SIGNIFICANT CHANGE UP (ref 4.8–10.8)
WBC # FLD AUTO: 7.63 K/UL — SIGNIFICANT CHANGE UP (ref 4.8–10.8)
WBC # FLD AUTO: 7.63 K/UL — SIGNIFICANT CHANGE UP (ref 4.8–10.8)
WBC # FLD AUTO: 9.05 K/UL — SIGNIFICANT CHANGE UP (ref 4.8–10.8)
WBC # FLD AUTO: 9.05 K/UL — SIGNIFICANT CHANGE UP (ref 4.8–10.8)

## 2023-12-15 PROCEDURE — 99233 SBSQ HOSP IP/OBS HIGH 50: CPT

## 2023-12-15 RX ORDER — SODIUM ZIRCONIUM CYCLOSILICATE 10 G/10G
10 POWDER, FOR SUSPENSION ORAL ONCE
Refills: 0 | Status: COMPLETED | OUTPATIENT
Start: 2023-12-15 | End: 2023-12-15

## 2023-12-15 RX ORDER — HYDRALAZINE HCL 50 MG
25 TABLET ORAL ONCE
Refills: 0 | Status: COMPLETED | OUTPATIENT
Start: 2023-12-15 | End: 2023-12-15

## 2023-12-15 RX ORDER — HYDRALAZINE HCL 50 MG
10 TABLET ORAL THREE TIMES A DAY
Refills: 0 | Status: DISCONTINUED | OUTPATIENT
Start: 2023-12-15 | End: 2023-12-16

## 2023-12-15 RX ORDER — SODIUM ZIRCONIUM CYCLOSILICATE 10 G/10G
5 POWDER, FOR SUSPENSION ORAL ONCE
Refills: 0 | Status: COMPLETED | OUTPATIENT
Start: 2023-12-15 | End: 2023-12-15

## 2023-12-15 RX ADMIN — Medication 8 UNIT(S): at 11:09

## 2023-12-15 RX ADMIN — Medication 60 MILLIGRAM(S): at 05:30

## 2023-12-15 RX ADMIN — SODIUM ZIRCONIUM CYCLOSILICATE 5 GRAM(S): 10 POWDER, FOR SUSPENSION ORAL at 22:18

## 2023-12-15 RX ADMIN — SODIUM ZIRCONIUM CYCLOSILICATE 10 GRAM(S): 10 POWDER, FOR SUSPENSION ORAL at 09:10

## 2023-12-15 RX ADMIN — INSULIN GLARGINE 10 UNIT(S): 100 INJECTION, SOLUTION SUBCUTANEOUS at 22:18

## 2023-12-15 RX ADMIN — SODIUM ZIRCONIUM CYCLOSILICATE 10 GRAM(S): 10 POWDER, FOR SUSPENSION ORAL at 02:52

## 2023-12-15 RX ADMIN — PANTOPRAZOLE SODIUM 40 MILLIGRAM(S): 20 TABLET, DELAYED RELEASE ORAL at 05:30

## 2023-12-15 RX ADMIN — PANTOPRAZOLE SODIUM 40 MILLIGRAM(S): 20 TABLET, DELAYED RELEASE ORAL at 16:55

## 2023-12-15 RX ADMIN — Medication 25 MILLIGRAM(S): at 09:10

## 2023-12-15 RX ADMIN — Medication 10 MILLIGRAM(S): at 22:18

## 2023-12-15 NOTE — PROGRESS NOTE ADULT - ASSESSMENT
78 F with PMHx of MDS, DM, HTN, CKD3 presents for low hemoglobin.  States she had labs drawn yesterday, hemoglobin was 6.6.  She gets blood transfusions roughly every 3 weeks.  Follows with Dr. Temple for oncology.  Currently not on any chemotherapy.  Currently asymptomatic. she reports her baseline hg is aprox 7. her ros is neg.     hg 6.6, mcv 91.9, plat count 505, neutrophil % 81.4, basophil % 2.6, metamyelocytes % 1.8, na 132, k 6.5, bun 44, cr 1.5, glc 409, no anion gap, gfr 36  VBG: ph 7.27, pco2 48, bicarb 22, lactate 3.1    ECG: normal sinus rhythm     temp 98.3, hr 95, bp 158/70, rr 20, spo2 100    ordered 2 units prbc, 6 units regular insulin and lokelma 5 g in ED.     #anemia 2/2 MDS  #lactic acidosis  #reported dark stool over past 2 weeks  -hg on admission 6.6  -KAREN negative   -ed ordered 2 units prbc  -f/u repeat cbc and vbg 8PM  -protonix bid  - hgb stable in am   - outpt GI  - follow up hgb in pm    #hyperkalemia  -ED ordered insulin and lokelma  -f/u bmp 4 PM  -tele for now  - lokelma today     #DM  -10/8/8/8 insulin  -sliding scale    #HTN - continue procardia , hydralzine 25 mg po x1, start hydralazine 10 mg tID with hold <120    #Progress Note Handoff  Pending (specify):  if hgb, bp and K stable can be DCed  Family discussion: house staff updated pt family  Disposition:  as above, Home  Decision to admit the pt is based on acuity as above

## 2023-12-15 NOTE — PATIENT PROFILE ADULT - FUNCTIONAL ASSESSMENT - BASIC MOBILITY 3.
Posterior Capsular Fibrosis Counseling: The diagnosis of posterior capsular fibrosis (PCF), also referred to as a secondary cataract or posterior capsular opacification (PCO), was discussed with the patient. The patient understands that their symptoms and limitations are likely related to this condition. I have reviewed the risks, benefits and alternatives of  YAG laser surgery for the treatment of the fibrosis. The uncommon risk of an increase in intraocular pressure or a retinal detachment and their associated symptoms were explained to the patient. The patient understands and desires to proceed with the laser surgery to improve their vision for ___PLAYING GOLF___. 3 = A little assistance

## 2023-12-15 NOTE — PATIENT PROFILE ADULT - FALL HARM RISK - UNIVERSAL INTERVENTIONS
Bed in lowest position, wheels locked, appropriate side rails in place/Call bell, personal items and telephone in reach/Instruct patient to call for assistance before getting out of bed or chair/Non-slip footwear when patient is out of bed/[place] to call system/Physically safe environment - no spills, clutter or unnecessary equipment/Purposeful Proactive Rounding/Room/bathroom lighting operational, light cord in reach Bed in lowest position, wheels locked, appropriate side rails in place/Call bell, personal items and telephone in reach/Instruct patient to call for assistance before getting out of bed or chair/Non-slip footwear when patient is out of bed/[place] to call system/Physically safe environment - no spills, clutter or unnecessary equipment/Purposeful Proactive Rounding/Room/bathroom lighting operational, light cord in reach

## 2023-12-15 NOTE — PROGRESS NOTE ADULT - SUBJECTIVE AND OBJECTIVE BOX
Patient is a 78y old  Female who presents with a chief complaint of how hg (12-14-23)      Pt seen and examined at bedside. No CP or SOB. no further black stools      PAST MEDICAL & SURGICAL HISTORY:  DM (diabetes mellitus)    MDS (myelodysplastic syndrome)    H/O colonoscopy        VITAL SIGNS (Last 24 hrs):  T(C): 36.2 (12-15-23 @ 15:49), Max: 37.1 (12-15-23 @ 06:10)  HR: 91 (12-15-23 @ 15:49) (74 - 94)  BP: 130/63 (12-15-23 @ 15:49) (130/63 - 202/92)  RR: 18 (12-15-23 @ 15:49) (18 - 18)  SpO2: 98% (12-15-23 @ 15:49) (98% - 100%)  Wt(kg): --  Daily     Daily     I&O's Summary      PHYSICAL EXAM:  GENERAL: NAD, elderly   HEAD:  Atraumatic, Normocephalic  EYES: EOMI, PERRLA, conjunctiva and sclera clear  NECK: Supple, No JVD  CHEST/LUNG: Clear to auscultation bilaterally; No wheeze  HEART: Regular rate and rhythm; No murmurs, rubs, or gallops  ABDOMEN: Soft, Nontender, Nondistended; Bowel sounds present  EXTREMITIES:  2+ Peripheral Pulses, No clubbing, cyanosis, or edema  PSYCH: AAOx3  NEUROLOGY: non-focal      Labs Reviewed  Spoke to patient in regards to abnormal labs.    CBC Full  -  ( 15 Dec 2023 06:53 )  WBC Count : 7.63 K/uL  Hemoglobin : 7.9 g/dL  Hematocrit : 24.4 %  Platelet Count - Automated : 411 K/uL  Mean Cell Volume : 88.7 fL  Mean Cell Hemoglobin : 28.7 pg  Mean Cell Hemoglobin Concentration : 32.4 g/dL  Auto Neutrophil # : 4.07 K/uL  Auto Lymphocyte # : 1.82 K/uL  Auto Monocyte # : 1.08 K/uL  Auto Eosinophil # : 0.21 K/uL  Auto Basophil # : 0.14 K/uL  Auto Neutrophil % : 53.2 %  Auto Lymphocyte % : 23.9 %  Auto Monocyte % : 14.2 %  Auto Eosinophil % : 2.8 %  Auto Basophil % : 1.8 %    BMP:    12-15 @ 06:53    Blood Urea Nitrogen - 46  Calcium - 8.7  Carbond Dioxide - 22  Chloride - 108  Creatinine - 1.5  Glucose - 70  Potassium - 5.5  Sodium - 140  CRP:      D-Dimer:      MEDICATIONS  (STANDING):  dextrose 5%. 1000 milliLiter(s) (100 mL/Hr) IV Continuous <Continuous>  dextrose 5%. 1000 milliLiter(s) (50 mL/Hr) IV Continuous <Continuous>  dextrose 50% Injectable 25 Gram(s) IV Push once  dextrose 50% Injectable 12.5 Gram(s) IV Push once  dextrose 50% Injectable 25 Gram(s) IV Push once  glucagon  Injectable 1 milliGRAM(s) IntraMuscular once  insulin glargine Injectable (LANTUS) 10 Unit(s) SubCutaneous at bedtime  insulin lispro Injectable (ADMELOG) 8 Unit(s) SubCutaneous three times a day before meals  NIFEdipine XL 60 milliGRAM(s) Oral daily  pantoprazole    Tablet 40 milliGRAM(s) Oral two times a day    MEDICATIONS  (PRN):  dextrose Oral Gel 15 Gram(s) Oral once PRN Blood Glucose LESS THAN 70 milliGRAM(s)/deciliter

## 2023-12-15 NOTE — PATIENT PROFILE ADULT - NSPRESCRALCSCORE_GEN_A_NUR_CAL
Visit Information Date & Time Provider Department Dept. Phone Encounter #  
 11/20/2017 10:45 AM Mayda Trotter, [address] [facility] [phone] [id number] Follow-up Instructions Return in about 2 weeks (around 12/4/2017) for results, follow-up. Upcoming Health Maintenance Date Due Hepatitis C Screening 1951 FOOT EXAM Q1 10/29/1962 MICROALBUMIN Q1 10/29/1962 EYE EXAM RETINAL OR DILATED Q1 10/29/1962 FOBT Q 1 YEAR AGE 50-75 10/29/2002 LIPID PANEL Q1 1/14/2011 HEMOGLOBIN A1C Q6M 6/13/2011 ZOSTER VACCINE AGE 60> 8/29/2012 GLAUCOMA SCREENING Q2Y 10/29/2017 OSTEOPOROSIS SCREENING (DEXA) 10/29/2017 Pneumococcal 65+ High/Highest Risk (2 of 2 - PPSV23) 10/29/2017 MEDICARE YEARLY EXAM 10/29/2017 BREAST CANCER SCRN MAMMOGRAM 1/17/2019 DTaP/Tdap/Td series (2 - Td) 4/5/2020 PAP AKA CERVICAL CYTOLOGY 10/12/2020 Allergies as of 11/20/2017  Review Complete On: 11/20/2017 By: Mayda Trotter MD  
  
 Severity Noted Reaction Type Reactions Penicillins  04/05/2010    Hives Current Immunizations  Reviewed on 11/20/2017 Name Date Hepatitis A Vaccine 10/20/2006, 1/9/2006 Influenza High Dose Vaccine PF 8/25/2017 Influenza Vaccine Whole 10/5/2009 Pneumococcal Conjugate (PCV-13)  Incomplete TD Vaccine 4/5/2006 TDAP Vaccine 4/5/2010 ZZZ-RETIRED (DO NOT USE) Pneumococcal Vaccine (Unspecified Type) 2/18/2004 Reviewed by Mayda Trotter MD on 11/20/2017 at 11:14 AM  
You Were Diagnosed With   
  
 Codes Comments Type 2 diabetes mellitus without complication, without long-term current use of insulin (HCC)    -  Primary ICD-10-CM: E11.9 ICD-9-CM: 250.00 Essential hypertension     ICD-10-CM: I10 
ICD-9-CM: 401.9 Systemic lupus erythematosus, unspecified SLE type, unspecified organ involvement status ([facility]     ICD-10-CM: M32.9 ICD-9-CM: 710.0 Need for hepatitis C screening test     ICD-10-CM: Z11.59 
ICD-9-CM: V73.89   
 ACP (advance care planning)     ICD-10-CM: Z71.89 ICD-9-CM: V65.49 Welcome to Medicare preventive visit     ICD-10-CM: Z00.00 ICD-9-CM: V70.0 Screening for alcoholism     ICD-10-CM: Z13.89 ICD-9-CM: V79.1 Screening for depression     ICD-10-CM: Z13.89 ICD-9-CM: V79.0 Encounter for immunization     ICD-10-CM: K44 ICD-9-CM: V03.89 Vitals BP Pulse Temp Resp Height(growth percentile) Weight(growth percentile) 139/74 75 97.1 °F (36.2 °C) (Oral) 20 5' 2\" (1.575 m) 211 lb (95.7 kg) SpO2 BMI OB Status Smoking Status 97% 38.59 kg/m2 Menopause Never Smoker BMI and BSA Data Body Mass Index Body Surface Area 38.59 kg/m 2 2.05 m 2 Preferred Pharmacy Pharmacy Name Phone [facility] [facility] [address]. [phone] Your Updated Medication List  
  
   
This list is accurate as of: 11/20/17 11:44 AM.  Always use your most recent med list.  
  
  
  
  
 aspirin delayed-release 81 mg tablet Take  by mouth daily. diph,pertuss(acel),tetanus vac(PF) 2 Lf-(2.5-5-3-5 mcg)-5Lf/0.5 mL Syrg vaccine Commonly known as:  ADACEL  
0.5 mL by IntraMUSCular route once for 1 dose. glipiZIDE 10 mg tablet Commonly known as:  Jason Larose Take 10 mg by mouth two (2) times a day. * hydroCHLOROthiazide 12.5 mg tablet Commonly known as:  HYDRODIURIL Take 12.5 mg by mouth daily. * hydroCHLOROthiazide 25 mg tablet Commonly known as:  HYDRODIURIL Take 1 Tab by mouth daily. ibuprofen 600 mg tablet Commonly known as:  MOTRIN Take 1 Tab by mouth every six (6) hours as needed. JANUMET -1,000 mg Tm24 Generic drug:  SITagliptin-metFORMIN Take 1 Tab by mouth daily. PLAQUENIL 200 mg tablet Generic drug:  hydroxychloroquine Take 200 mg by mouth daily. * ALTACE 10 mg capsule Generic drug:  ramipril Take 10 mg by mouth daily. * ramipril 10 mg capsule Commonly known as:  ALTACE  
TAKE ONE CAPSULE BY MOUTH DAILY * WELCHOL 625 mg tablet Generic drug:  colesevelam  
Take 1,875 mg by mouth two (2) times daily (with meals). * WELCHOL 625 mg tablet Generic drug:  colesevelam  
Take 1,875 mg by mouth two (2) times daily (with meals). * Notice: This list has 6 medication(s) that are the same as other medications prescribed for you. Read the directions carefully, and ask your doctor or other care provider to review them with you. Prescriptions Printed Refills diph,pertuss,acel,,tetanus vac,PF, (ADACEL) 2 Lf-(2.5-5-3-5 mcg)-5Lf/0.5 mL syrg vaccine 0 Si.5 mL by IntraMUSCular route once for 1 dose. Class: Print Route: IntraMUSCular We Performed the Following ADMIN PNEUMOCOCCAL VACCINE [ \[facility]\""] [address] [[id number] \[facility]\""] PNEUMOCOCCAL CONJ VACCINE 13 VALENT IM [id number] CPT(R)] [place] ANNUAL ALCOHOL SCREEN 15 MIN [id number] \[facility]\""] Follow-up Instructions Return in about 2 weeks (around 2017) for results, follow-up. To-Do List   
 2017 Lab:  CBC WITH AUTOMATED DIFF   
  
 2017 Lab:  HCV AB W/RFLX TO ANALI   
  
 2017 Lab:  HEMOGLOBIN A1C WITH EAG   
  
 2017 Lab:  LIPID PANEL   
  
 2017 Lab:  METABOLIC PANEL, COMPREHENSIVE   
  
 2017 Lab:  MICROALBUMIN, UR, RAND W/ MICROALBUMIN/CREA RATIO Around 2017 Lab:  URINALYSIS W/ RFLX MICROSCOPIC Patient Instructions Medicare Wellness Visit, Female The best way to live healthy is to have a healthy lifestyle by eating a well-balanced diet, exercising regularly, limiting alcohol and stopping smoking. Regular physical exams and screening tests are another way to keep healthy.  Preventive exams provided by your health care provider can find health problems before they become diseases or illnesses. Preventive services including immunizations, screening tests, monitoring and exams can help you take care of your own health. All people over age 72 should have a pneumovax  and and a prevnar shot to prevent pneumonia. These are once in a lifetime unless you and your provider decide differently. All people over 65 should have a yearly flu shot and a tetanus vaccine every 10 years. A bone mass density to screen for osteoporosis or thinning of the bones should be done every 2 years after 65. Screening for diabetes mellitus with a blood sugar test should be done every year. Glaucoma is a disease of the eye due to increased ocular pressure that can lead to blindness and it should be done every year by an eye professional. 
 
Cardiovascular screening tests that check for elevated lipids (fatty part of blood) which can lead to heart disease and strokes should be done every 5 years. Colorectal screening that evaluates for blood or polyps in your colon should be done yearly as a stool test or every five years as a flexible sigmoidoscope or every 10 years as a colonoscopy up to age 76. Breast cancer screening with a mammogram is recommended biennially  for women age 54-69. Screening for cervical cancer with a pap smear and pelvic exam is recommended for women after age 72 years every 2 years up to age 79 or when the provider and patient decide to stop. If there is a history of cervical abnormalities or other increased risk for cancer then the test is recommended yearly. Hepatitis C screening is also recommended for anyone born between [birth year] through [address]. A shingles vaccine is also recommended once in a lifetime after age 61. Your Medicare Wellness Exam is recommended annually. Here is a list of your current Health Maintenance items with a due date: 
Health Maintenance Due Topic Date Due  
 Hepatitis C Test  1951 Nay Blackmon Diabetic Foot Care  10/29/1962  Albumin Urine Test  10/29/1962 Nay Blackmon Eye Exam  10/29/1962  Stool testing for trace blood  10/29/2002  Cholesterol Test   01/14/2011  Hemoglobin A1C    06/13/2011  Shingles Vaccine  08/29/2012  Glaucoma Screening   10/29/2017  Bone Density Screening  10/29/2017  Pneumococcal Vaccine (2 of 2 - PPSV23) 10/29/2017 Nay Blackmon Annual Well Visit  10/29/2017 [facility]! Dear Mikala Bassett: Thank you for requesting a [employer] account. Our records indicate that you already have an active [employer] account. You can access your account anytime at [email or web address] Did you know that you can access your hospital and ER discharge instructions at any time in [employer]? You can also review all of your test results from your hospital stay or ER visit. Additional Information If you have questions, please visit the Frequently Asked Questions section of the [employer] website at [email or web address] Remember, [employer] is NOT to be used for urgent needs. For medical emergencies, dial 911. Now available from your iPhone and Android! Please provide this summary of care documentation to your next provider. Your primary care clinician is listed as Markel Drake. If you have any questions after today's visit, please call [phone]. 1

## 2023-12-15 NOTE — PATIENT PROFILE ADULT - FUNCTIONAL SCREEN CURRENT LEVEL: COMMUNICATION, MLM
[FreeTextEntry1] : - Low fat diet for 2 more weeks, then advance to regular as tolerated. \par - Follow up prn
0 = understands/communicates without difficulty

## 2023-12-16 ENCOUNTER — TRANSCRIPTION ENCOUNTER (OUTPATIENT)
Age: 78
End: 2023-12-16

## 2023-12-16 VITALS
TEMPERATURE: 97 F | OXYGEN SATURATION: 98 % | DIASTOLIC BLOOD PRESSURE: 70 MMHG | SYSTOLIC BLOOD PRESSURE: 154 MMHG | RESPIRATION RATE: 18 BRPM | HEART RATE: 85 BPM

## 2023-12-16 LAB
ALBUMIN SERPL ELPH-MCNC: 4.2 G/DL — SIGNIFICANT CHANGE UP (ref 3.5–5.2)
ALBUMIN SERPL ELPH-MCNC: 4.2 G/DL — SIGNIFICANT CHANGE UP (ref 3.5–5.2)
ALP SERPL-CCNC: 94 U/L — SIGNIFICANT CHANGE UP (ref 30–115)
ALP SERPL-CCNC: 94 U/L — SIGNIFICANT CHANGE UP (ref 30–115)
ALT FLD-CCNC: 14 U/L — SIGNIFICANT CHANGE UP (ref 0–41)
ALT FLD-CCNC: 14 U/L — SIGNIFICANT CHANGE UP (ref 0–41)
ANION GAP SERPL CALC-SCNC: 10 MMOL/L — SIGNIFICANT CHANGE UP (ref 7–14)
ANION GAP SERPL CALC-SCNC: 10 MMOL/L — SIGNIFICANT CHANGE UP (ref 7–14)
AST SERPL-CCNC: 13 U/L — SIGNIFICANT CHANGE UP (ref 0–41)
AST SERPL-CCNC: 13 U/L — SIGNIFICANT CHANGE UP (ref 0–41)
BILIRUB SERPL-MCNC: 0.8 MG/DL — SIGNIFICANT CHANGE UP (ref 0.2–1.2)
BILIRUB SERPL-MCNC: 0.8 MG/DL — SIGNIFICANT CHANGE UP (ref 0.2–1.2)
BUN SERPL-MCNC: 50 MG/DL — HIGH (ref 10–20)
BUN SERPL-MCNC: 50 MG/DL — HIGH (ref 10–20)
CALCIUM SERPL-MCNC: 9.1 MG/DL — SIGNIFICANT CHANGE UP (ref 8.4–10.4)
CALCIUM SERPL-MCNC: 9.1 MG/DL — SIGNIFICANT CHANGE UP (ref 8.4–10.4)
CHLORIDE SERPL-SCNC: 108 MMOL/L — SIGNIFICANT CHANGE UP (ref 98–110)
CHLORIDE SERPL-SCNC: 108 MMOL/L — SIGNIFICANT CHANGE UP (ref 98–110)
CO2 SERPL-SCNC: 23 MMOL/L — SIGNIFICANT CHANGE UP (ref 17–32)
CO2 SERPL-SCNC: 23 MMOL/L — SIGNIFICANT CHANGE UP (ref 17–32)
CREAT SERPL-MCNC: 1.7 MG/DL — HIGH (ref 0.7–1.5)
CREAT SERPL-MCNC: 1.7 MG/DL — HIGH (ref 0.7–1.5)
EGFR: 31 ML/MIN/1.73M2 — LOW
EGFR: 31 ML/MIN/1.73M2 — LOW
GLUCOSE SERPL-MCNC: 118 MG/DL — HIGH (ref 70–99)
GLUCOSE SERPL-MCNC: 118 MG/DL — HIGH (ref 70–99)
HCT VFR BLD CALC: 23.1 % — LOW (ref 37–47)
HCT VFR BLD CALC: 23.1 % — LOW (ref 37–47)
HGB BLD-MCNC: 7.5 G/DL — LOW (ref 12–16)
HGB BLD-MCNC: 7.5 G/DL — LOW (ref 12–16)
MAGNESIUM SERPL-MCNC: 1.8 MG/DL — SIGNIFICANT CHANGE UP (ref 1.8–2.4)
MAGNESIUM SERPL-MCNC: 1.8 MG/DL — SIGNIFICANT CHANGE UP (ref 1.8–2.4)
MCHC RBC-ENTMCNC: 28.6 PG — SIGNIFICANT CHANGE UP (ref 27–31)
MCHC RBC-ENTMCNC: 28.6 PG — SIGNIFICANT CHANGE UP (ref 27–31)
MCHC RBC-ENTMCNC: 32.5 G/DL — SIGNIFICANT CHANGE UP (ref 32–37)
MCHC RBC-ENTMCNC: 32.5 G/DL — SIGNIFICANT CHANGE UP (ref 32–37)
MCV RBC AUTO: 88.2 FL — SIGNIFICANT CHANGE UP (ref 81–99)
MCV RBC AUTO: 88.2 FL — SIGNIFICANT CHANGE UP (ref 81–99)
NRBC # BLD: 0 /100 WBCS — SIGNIFICANT CHANGE UP (ref 0–0)
NRBC # BLD: 0 /100 WBCS — SIGNIFICANT CHANGE UP (ref 0–0)
PLATELET # BLD AUTO: 405 K/UL — HIGH (ref 130–400)
PLATELET # BLD AUTO: 405 K/UL — HIGH (ref 130–400)
PMV BLD: 10.3 FL — SIGNIFICANT CHANGE UP (ref 7.4–10.4)
PMV BLD: 10.3 FL — SIGNIFICANT CHANGE UP (ref 7.4–10.4)
POTASSIUM SERPL-MCNC: 5.3 MMOL/L — HIGH (ref 3.5–5)
POTASSIUM SERPL-MCNC: 5.3 MMOL/L — HIGH (ref 3.5–5)
POTASSIUM SERPL-SCNC: 5.3 MMOL/L — HIGH (ref 3.5–5)
POTASSIUM SERPL-SCNC: 5.3 MMOL/L — HIGH (ref 3.5–5)
PROT SERPL-MCNC: 5.7 G/DL — LOW (ref 6–8)
PROT SERPL-MCNC: 5.7 G/DL — LOW (ref 6–8)
RBC # BLD: 2.62 M/UL — LOW (ref 4.2–5.4)
RBC # BLD: 2.62 M/UL — LOW (ref 4.2–5.4)
RBC # FLD: 18.6 % — HIGH (ref 11.5–14.5)
RBC # FLD: 18.6 % — HIGH (ref 11.5–14.5)
SODIUM SERPL-SCNC: 141 MMOL/L — SIGNIFICANT CHANGE UP (ref 135–146)
SODIUM SERPL-SCNC: 141 MMOL/L — SIGNIFICANT CHANGE UP (ref 135–146)
WBC # BLD: 6.96 K/UL — SIGNIFICANT CHANGE UP (ref 4.8–10.8)
WBC # BLD: 6.96 K/UL — SIGNIFICANT CHANGE UP (ref 4.8–10.8)
WBC # FLD AUTO: 6.96 K/UL — SIGNIFICANT CHANGE UP (ref 4.8–10.8)
WBC # FLD AUTO: 6.96 K/UL — SIGNIFICANT CHANGE UP (ref 4.8–10.8)

## 2023-12-16 PROCEDURE — 99239 HOSP IP/OBS DSCHRG MGMT >30: CPT

## 2023-12-16 RX ORDER — HYDRALAZINE HCL 50 MG
1 TABLET ORAL
Qty: 90 | Refills: 0
Start: 2023-12-16 | End: 2024-01-14

## 2023-12-16 RX ORDER — HYDRALAZINE HCL 50 MG
10 TABLET ORAL ONCE
Refills: 0 | Status: COMPLETED | OUTPATIENT
Start: 2023-12-16 | End: 2023-12-16

## 2023-12-16 RX ORDER — NIFEDIPINE 30 MG
30 TABLET, EXTENDED RELEASE 24 HR ORAL ONCE
Refills: 0 | Status: COMPLETED | OUTPATIENT
Start: 2023-12-16 | End: 2023-12-16

## 2023-12-16 RX ORDER — SODIUM ZIRCONIUM CYCLOSILICATE 10 G/10G
5 POWDER, FOR SUSPENSION ORAL ONCE
Refills: 0 | Status: COMPLETED | OUTPATIENT
Start: 2023-12-16 | End: 2023-12-16

## 2023-12-16 RX ORDER — NIFEDIPINE 30 MG
1 TABLET, EXTENDED RELEASE 24 HR ORAL
Qty: 30 | Refills: 0
Start: 2023-12-16 | End: 2024-01-14

## 2023-12-16 RX ORDER — SODIUM ZIRCONIUM CYCLOSILICATE 10 G/10G
5 POWDER, FOR SUSPENSION ORAL
Qty: 75 | Refills: 0
Start: 2023-12-16 | End: 2023-12-30

## 2023-12-16 RX ADMIN — Medication 60 MILLIGRAM(S): at 05:15

## 2023-12-16 RX ADMIN — SODIUM ZIRCONIUM CYCLOSILICATE 5 GRAM(S): 10 POWDER, FOR SUSPENSION ORAL at 10:00

## 2023-12-16 RX ADMIN — PANTOPRAZOLE SODIUM 40 MILLIGRAM(S): 20 TABLET, DELAYED RELEASE ORAL at 05:15

## 2023-12-16 RX ADMIN — Medication 8 UNIT(S): at 08:21

## 2023-12-16 RX ADMIN — Medication 30 MILLIGRAM(S): at 13:01

## 2023-12-16 RX ADMIN — Medication 8 UNIT(S): at 12:34

## 2023-12-16 RX ADMIN — Medication 10 MILLIGRAM(S): at 05:15

## 2023-12-16 RX ADMIN — Medication 10 MILLIGRAM(S): at 06:45

## 2023-12-16 NOTE — DISCHARGE NOTE PROVIDER - NSDCCPCAREPLAN_GEN_ALL_CORE_FT
PRINCIPAL DISCHARGE DIAGNOSIS  Diagnosis: Low hemoglobin  Assessment and Plan of Treatment: You presented for low hemoglobin. On admission your hemoglobin was low for which you got 2 units of packed red cells during your stay and the hemoglobin stabilised. You also had very high potassium levels for which your were given medications and you are being discharged with lokelma to regulate your potassium levels, please followup with your PC with a metabolic panel to recheck the potassium levels. Your blood pressure during admission was high and you were started on a new anti-hypertensive medications. Please continue taking all your medications and followup with your PCP.      SECONDARY DISCHARGE DIAGNOSES  Diagnosis: Hyperkalemia  Assessment and Plan of Treatment:     Diagnosis: Hyperglycemia  Assessment and Plan of Treatment:

## 2023-12-16 NOTE — DISCHARGE NOTE PROVIDER - NSDCFUSCHEDAPPT_GEN_ALL_CORE_FT
Kaveh Wade  [facility]  Scheduled Appointment: 01/04/2024    Nicolás Andres  [facility] Physician [facility]  [facility] [address]  Scheduled Appointment: 01/04/2024    [facility] Physician [facility]  [facility] [address]  Scheduled Appointment: 01/04/2024     Kaveh Wade  [facility]  Scheduled Appointment: 01/04/2024    Nicolás Andres  [facility] Physician [facility]  [facility] [address]  Scheduled Appointment: 01/04/2024    [facility] Physician [facility]  [facility] [address]  Scheduled Appointment: 01/04/2024

## 2023-12-16 NOTE — DISCHARGE NOTE PROVIDER - HOSPITAL COURSE
78 F with PMHx of MDS, DM, HTN, CKD3 presents for low hemoglobin.  States she had labs drawn yesterday, hemoglobin was 6.6.  She gets blood transfusions roughly every 3 weeks.  Follows with Dr. Temple for oncology.  Currently not on any chemotherapy.  Currently asymptomatic. she reports her baseline hg is aprox 7. her ros is neg.     hg 6.6, mcv 91.9, plat count 505, neutrophil % 81.4, basophil % 2.6, metamyelocytes % 1.8, na 132, k 6.5, bun 44, cr 1.5, glc 409, no anion gap, gfr 36  VBG: ph 7.27, pco2 48, bicarb 22, lactate 3.1    ECG: normal sinus rhythm     temp 98.3, hr 95, bp 158/70, rr 20, spo2 100    ordered 2 units prbc, 6 units regular insulin and lokelma 5 g in ED.     #anemia 2/2 MDS  #lactic acidosis  #reported dark stool over past 2 weeks  -hg on admission 6.6  -KAREN negative   -ed ordered 2 units prbc  -f/u repeat cbc and vbg 8PM  -protonix bid  - hgb stable in am   - outpt GI  - follow up hgb in pm    #hyperkalemia  -ED ordered insulin and lokelma  -f/u bmp 4 PM  -tele for now  - [facility] today     #DM  -10/8/8/8 insulin  -sliding scale    #HTN - continue procardia , hydralzine 25 mg po x1, start hydralazine 10 mg tID with hold <120    Patient was examined at bedside, is vitally stable and is ready to be discharged. 78 F with PMHx of MDS, DM, HTN, CKD3 presents for low hemoglobin.  States she had labs drawn yesterday, hemoglobin was 6.6.  She gets blood transfusions roughly every 3 weeks.  Follows with Dr. Temple for oncology.  Currently not on any chemotherapy.  Currently asymptomatic. she reports her baseline hg is aprox 7. her ros is neg.     hg 6.6, mcv 91.9, plat count 505, neutrophil % 81.4, basophil % 2.6, metamyelocytes % 1.8, na 132, k 6.5, bun 44, cr 1.5, glc 409, no anion gap, gfr 36  VBG: ph 7.27, pco2 48, bicarb 22, lactate 3.1    ECG: normal sinus rhythm     temp 98.3, hr 95, bp 158/70, rr 20, spo2 100    ordered 2 units prbc, 6 units regular insulin and lokelma 5 g in ED.     #anemia 2/2 MDS  #lactic acidosis  #reported dark stool over past 2 weeks  -hg on admission 6.6  -KAREN negative   -ed ordered 2 units prbc  -f/u repeat cbc and vbg 8PM  -protonix bid  - hgb stable in am   - outpt GI  - follow up hgb in pm    #hyperkalemia  -ED ordered insulin and lokelma  -f/u bmp 4 PM  -tele for now  - [facility] today     #DM  -10/8/8/8 insulin  -sliding scale    #HTN - continue procardia , hydralzine 25 mg po x1, start hydralazine 10 mg tID with hold <120    Patient was examined at bedside, is vitally stable and is ready to be discharged.

## 2023-12-16 NOTE — DISCHARGE NOTE PROVIDER - CARE PROVIDER_API CALL
David Torres  Internal Medicine  [address] [place] [address]  Phone: [phone]  Fax: [phone]  Follow Up Time: 1 week   David Torres  Internal Medicine  [address] [place] [address]  Phone: [phone]  Fax: ()-  Follow Up Time: 1 week

## 2023-12-16 NOTE — DISCHARGE NOTE PROVIDER - NSDCMRMEDTOKEN_GEN_ALL_CORE_FT
glimepiride 2 mg oral tablet: 1 tab(s) orally 2 times a day  hydrALAZINE 10 mg oral tablet: 1 tab(s) orally 3 times a day Hold Hydralazine 10 mg if BP &lt; 130  Lokelma 5 g oral powder for reconstitution: 5 gram(s) orally once a day Till repeat BMP shows normal potassium levels  NIFEdipine (Eqv-Procardia XL) 90 mg oral tablet, extended release: 1 tab(s) orally once a day  pioglitazone 30 mg oral tablet: 1 tab(s) orally once a day  Tradjenta 5 mg oral tablet: 1 tab(s) orally once a day

## 2023-12-16 NOTE — DISCHARGE NOTE NURSING/CASE MANAGEMENT/SOCIAL WORK - PATIENT PORTAL LINK FT
You can access the FollowMyHealth Patient Portal offered by [facility] by registering at the following website: [email or web address]. By joining [employer]’s FollowMyHealth portal, you will also be able to view your health information using other applications (apps) compatible with our system. You can access the FollowMyHealth Patient Portal offered by [facility] by registering at the following website: [email or web address]. By joining [employer]’s FollowMyHealth portal, you will also be able to view your health information using other applications (apps) compatible with our system.

## 2023-12-16 NOTE — DISCHARGE NOTE NURSING/CASE MANAGEMENT/SOCIAL WORK - NSDCPEFALRISK_GEN_ALL_CORE
For information on Fall & Injury Prevention, visit: [email or web address] OR  [email or web address] OR  https://www.cdc.gov/steadi/patient.html For information on Fall & Injury Prevention, visit: [email or web address] OR  [email or web address]/[email or web address] OR  https://www.cdc.gov/steadi/patient.html

## 2023-12-16 NOTE — DISCHARGE NOTE PROVIDER - ATTENDING DISCHARGE PHYSICAL EXAMINATION:
Attending attestation  Attending DC note  Pt seen and examined at bedside. No cp or sob.   vitals, labs, exam stable  Hospital course as above.  Plan dw pt and agreed to plan  Medically cleared for DC. Med recc completed.  [place] resident. Spent 32 mins on case

## 2023-12-16 NOTE — DISCHARGE NOTE PROVIDER - PROVIDER TOKENS
PROVIDER:[TOKEN:[[id number]],FOLLOWUP:[1 week]] PROVIDER:[TOKEN:[[id number]],FOLLOWUP:[1 week]]

## 2023-12-20 DIAGNOSIS — E11.65 TYPE 2 DIABETES MELLITUS WITH HYPERGLYCEMIA: ICD-10-CM

## 2023-12-20 DIAGNOSIS — D63.0 ANEMIA IN NEOPLASTIC DISEASE: ICD-10-CM

## 2023-12-20 DIAGNOSIS — E87.20 ACIDOSIS, UNSPECIFIED: ICD-10-CM

## 2023-12-20 DIAGNOSIS — E87.5 HYPERKALEMIA: ICD-10-CM

## 2023-12-20 DIAGNOSIS — Z79.84 LONG TERM (CURRENT) USE OF ORAL HYPOGLYCEMIC DRUGS: ICD-10-CM

## 2023-12-20 DIAGNOSIS — D46.9 MYELODYSPLASTIC SYNDROME, UNSPECIFIED: ICD-10-CM

## 2023-12-20 DIAGNOSIS — I12.9 HYPERTENSIVE CHRONIC KIDNEY DISEASE WITH STAGE 1 THROUGH STAGE 4 CHRONIC KIDNEY DISEASE, OR UNSPECIFIED CHRONIC KIDNEY DISEASE: ICD-10-CM

## 2023-12-20 DIAGNOSIS — N18.31 CHRONIC KIDNEY DISEASE, STAGE 3A: ICD-10-CM

## 2023-12-20 DIAGNOSIS — E11.22 TYPE 2 DIABETES MELLITUS WITH DIABETIC CHRONIC KIDNEY DISEASE: ICD-10-CM

## 2024-01-01 ENCOUNTER — OUTPATIENT (OUTPATIENT)
Dept: OUTPATIENT SERVICES | Facility: HOSPITAL | Age: 79
LOS: 1 days | End: 2024-01-01
Payer: MEDICARE

## 2024-01-01 ENCOUNTER — INPATIENT (INPATIENT)
Facility: HOSPITAL | Age: 79
LOS: 13 days | DRG: 872 | End: 2025-01-14
Attending: INTERNAL MEDICINE | Admitting: INTERNAL MEDICINE
Payer: MEDICARE

## 2024-01-01 VITALS
TEMPERATURE: 100 F | RESPIRATION RATE: 16 BRPM | SYSTOLIC BLOOD PRESSURE: 116 MMHG | HEART RATE: 93 BPM | HEIGHT: 64 IN | OXYGEN SATURATION: 100 % | DIASTOLIC BLOOD PRESSURE: 51 MMHG

## 2024-01-01 DIAGNOSIS — Z98.890 OTHER SPECIFIED POSTPROCEDURAL STATES: Chronic | ICD-10-CM

## 2024-01-01 DIAGNOSIS — A41.9 SEPSIS, UNSPECIFIED ORGANISM: ICD-10-CM

## 2024-01-01 DIAGNOSIS — Z93.1 GASTROSTOMY STATUS: Chronic | ICD-10-CM

## 2024-01-01 DIAGNOSIS — D46.9 MYELODYSPLASTIC SYNDROME, UNSPECIFIED: ICD-10-CM

## 2024-01-01 LAB
ALBUMIN SERPL ELPH-MCNC: 3.4 G/DL — LOW (ref 3.5–5.2)
ALP SERPL-CCNC: 110 U/L — SIGNIFICANT CHANGE UP (ref 30–115)
ALT FLD-CCNC: 15 U/L — SIGNIFICANT CHANGE UP (ref 0–41)
ANION GAP SERPL CALC-SCNC: 11 MMOL/L — SIGNIFICANT CHANGE UP (ref 7–14)
ANION GAP SERPL CALC-SCNC: 7 MMOL/L — SIGNIFICANT CHANGE UP (ref 7–14)
APPEARANCE UR: ABNORMAL
AST SERPL-CCNC: 17 U/L — SIGNIFICANT CHANGE UP (ref 0–41)
BACTERIA # UR AUTO: NEGATIVE /HPF — SIGNIFICANT CHANGE UP
BASE EXCESS BLDV CALC-SCNC: -0.5 MMOL/L — SIGNIFICANT CHANGE UP (ref -2–3)
BASOPHILS # BLD AUTO: 0 K/UL — SIGNIFICANT CHANGE UP (ref 0–0.2)
BASOPHILS NFR BLD AUTO: 0 % — SIGNIFICANT CHANGE UP (ref 0–1)
BILIRUB SERPL-MCNC: 0.4 MG/DL — SIGNIFICANT CHANGE UP (ref 0.2–1.2)
BILIRUB UR-MCNC: NEGATIVE — SIGNIFICANT CHANGE UP
BUN SERPL-MCNC: 63 MG/DL — CRITICAL HIGH (ref 10–20)
BUN SERPL-MCNC: 63 MG/DL — CRITICAL HIGH (ref 10–20)
CA-I SERPL-SCNC: 1.5 MMOL/L — HIGH (ref 1.15–1.33)
CALCIUM SERPL-MCNC: 9.4 MG/DL — SIGNIFICANT CHANGE UP (ref 8.4–10.4)
CALCIUM SERPL-MCNC: 9.5 MG/DL — SIGNIFICANT CHANGE UP (ref 8.4–10.5)
CAST: 1 /LPF — SIGNIFICANT CHANGE UP (ref 0–4)
CHLORIDE SERPL-SCNC: 110 MMOL/L — SIGNIFICANT CHANGE UP (ref 98–110)
CHLORIDE SERPL-SCNC: 111 MMOL/L — HIGH (ref 98–110)
CK SERPL-CCNC: 12 U/L — SIGNIFICANT CHANGE UP (ref 0–225)
CO2 SERPL-SCNC: 24 MMOL/L — SIGNIFICANT CHANGE UP (ref 17–32)
CO2 SERPL-SCNC: 26 MMOL/L — SIGNIFICANT CHANGE UP (ref 17–32)
COLOR SPEC: YELLOW — SIGNIFICANT CHANGE UP
CREAT SERPL-MCNC: 1.2 MG/DL — SIGNIFICANT CHANGE UP (ref 0.7–1.5)
CREAT SERPL-MCNC: 1.2 MG/DL — SIGNIFICANT CHANGE UP (ref 0.7–1.5)
DIFF PNL FLD: ABNORMAL
EGFR: 46 ML/MIN/1.73M2 — LOW
EGFR: 46 ML/MIN/1.73M2 — LOW
EOSINOPHIL # BLD AUTO: 0.24 K/UL — SIGNIFICANT CHANGE UP (ref 0–0.7)
EOSINOPHIL NFR BLD AUTO: 2.6 % — SIGNIFICANT CHANGE UP (ref 0–8)
GAS PNL BLDV: 142 MMOL/L — SIGNIFICANT CHANGE UP (ref 136–145)
GAS PNL BLDV: SIGNIFICANT CHANGE UP
GAS PNL BLDV: SIGNIFICANT CHANGE UP
GLUCOSE BLDC GLUCOMTR-MCNC: 337 MG/DL — HIGH (ref 70–99)
GLUCOSE SERPL-MCNC: 339 MG/DL — HIGH (ref 70–99)
GLUCOSE SERPL-MCNC: 343 MG/DL — HIGH (ref 70–99)
GLUCOSE UR QL: NEGATIVE MG/DL — SIGNIFICANT CHANGE UP
HCO3 BLDV-SCNC: 25 MMOL/L — SIGNIFICANT CHANGE UP (ref 22–29)
HCT VFR BLD CALC: 18.9 % — LOW (ref 37–47)
HCT VFR BLDA CALC: 19 % — CRITICAL LOW (ref 34.5–46.5)
HGB BLD CALC-MCNC: 6.3 G/DL — CRITICAL LOW (ref 11.7–16.1)
HGB BLD-MCNC: 5.8 G/DL — CRITICAL LOW (ref 12–16)
KETONES UR-MCNC: NEGATIVE MG/DL — SIGNIFICANT CHANGE UP
LACTATE BLDV-MCNC: 2.7 MMOL/L — HIGH (ref 0.5–2)
LACTATE SERPL-SCNC: 2.5 MMOL/L — HIGH (ref 0.7–2)
LEUKOCYTE ESTERASE UR-ACNC: ABNORMAL
LYMPHOCYTES # BLD AUTO: 2.36 K/UL — SIGNIFICANT CHANGE UP (ref 1.2–3.4)
LYMPHOCYTES # BLD AUTO: 26.1 % — SIGNIFICANT CHANGE UP (ref 20.5–51.1)
MCHC RBC-ENTMCNC: 29.1 PG — SIGNIFICANT CHANGE UP (ref 27–31)
MCHC RBC-ENTMCNC: 30.7 G/DL — LOW (ref 32–37)
MCV RBC AUTO: 95 FL — SIGNIFICANT CHANGE UP (ref 81–99)
MONOCYTES # BLD AUTO: 1.02 K/UL — HIGH (ref 0.1–0.6)
MONOCYTES NFR BLD AUTO: 11.3 % — HIGH (ref 1.7–9.3)
NEUTROPHILS # BLD AUTO: 5.42 K/UL — SIGNIFICANT CHANGE UP (ref 1.4–6.5)
NEUTROPHILS NFR BLD AUTO: 60 % — SIGNIFICANT CHANGE UP (ref 42.2–75.2)
NITRITE UR-MCNC: NEGATIVE — SIGNIFICANT CHANGE UP
PCO2 BLDV: 44 MMHG — HIGH (ref 39–42)
PH BLDV: 7.36 — SIGNIFICANT CHANGE UP (ref 7.32–7.43)
PH UR: 6 — SIGNIFICANT CHANGE UP (ref 5–8)
PLATELET # BLD AUTO: 349 K/UL — SIGNIFICANT CHANGE UP (ref 130–400)
PMV BLD: 11.7 FL — HIGH (ref 7.4–10.4)
PO2 BLDV: 53 MMHG — HIGH (ref 25–45)
POTASSIUM BLDV-SCNC: 7 MMOL/L — CRITICAL HIGH (ref 3.5–5.1)
POTASSIUM SERPL-MCNC: 7.1 MMOL/L — CRITICAL HIGH (ref 3.5–5)
POTASSIUM SERPL-MCNC: 7.6 MMOL/L — CRITICAL HIGH (ref 3.5–5)
POTASSIUM SERPL-SCNC: 7.1 MMOL/L — CRITICAL HIGH (ref 3.5–5)
POTASSIUM SERPL-SCNC: 7.6 MMOL/L — CRITICAL HIGH (ref 3.5–5)
PROT SERPL-MCNC: 5.6 G/DL — LOW (ref 6–8)
PROT UR-MCNC: 100 MG/DL
RBC # BLD: 1.99 M/UL — LOW (ref 4.2–5.4)
RBC # FLD: 16.2 % — HIGH (ref 11.5–14.5)
RBC CASTS # UR COMP ASSIST: 5 /HPF — HIGH (ref 0–4)
SAO2 % BLDV: 92.4 % — HIGH (ref 67–88)
SODIUM SERPL-SCNC: 143 MMOL/L — SIGNIFICANT CHANGE UP (ref 135–146)
SODIUM SERPL-SCNC: 146 MMOL/L — SIGNIFICANT CHANGE UP (ref 135–146)
SP GR SPEC: 1.02 — SIGNIFICANT CHANGE UP (ref 1–1.03)
SQUAMOUS # UR AUTO: 0 /HPF — SIGNIFICANT CHANGE UP (ref 0–5)
UROBILINOGEN FLD QL: 0.2 MG/DL — SIGNIFICANT CHANGE UP (ref 0.2–1)
WBC # BLD: 9.04 K/UL — SIGNIFICANT CHANGE UP (ref 4.8–10.8)
WBC # FLD AUTO: 9.04 K/UL — SIGNIFICANT CHANGE UP (ref 4.8–10.8)
WBC UR QL: 299 /HPF — HIGH (ref 0–5)

## 2024-01-01 PROCEDURE — 87641 MR-STAPH DNA AMP PROBE: CPT

## 2024-01-01 PROCEDURE — 86923 COMPATIBILITY TEST ELECTRIC: CPT

## 2024-01-01 PROCEDURE — 85027 COMPLETE CBC AUTOMATED: CPT

## 2024-01-01 PROCEDURE — 99285 EMERGENCY DEPT VISIT HI MDM: CPT | Mod: FS

## 2024-01-01 PROCEDURE — 82803 BLOOD GASES ANY COMBINATION: CPT

## 2024-01-01 PROCEDURE — 71045 X-RAY EXAM CHEST 1 VIEW: CPT

## 2024-01-01 PROCEDURE — 36415 COLL VENOUS BLD VENIPUNCTURE: CPT

## 2024-01-01 PROCEDURE — 93970 EXTREMITY STUDY: CPT

## 2024-01-01 PROCEDURE — 83615 LACTATE (LD) (LDH) ENZYME: CPT

## 2024-01-01 PROCEDURE — 84100 ASSAY OF PHOSPHORUS: CPT

## 2024-01-01 PROCEDURE — 85045 AUTOMATED RETICULOCYTE COUNT: CPT

## 2024-01-01 PROCEDURE — 71045 X-RAY EXAM CHEST 1 VIEW: CPT | Mod: 26

## 2024-01-01 PROCEDURE — 83735 ASSAY OF MAGNESIUM: CPT

## 2024-01-01 PROCEDURE — P9040: CPT

## 2024-01-01 PROCEDURE — 87640 STAPH A DNA AMP PROBE: CPT

## 2024-01-01 PROCEDURE — 80053 COMPREHEN METABOLIC PANEL: CPT

## 2024-01-01 PROCEDURE — 82962 GLUCOSE BLOOD TEST: CPT

## 2024-01-01 PROCEDURE — 84132 ASSAY OF SERUM POTASSIUM: CPT

## 2024-01-01 PROCEDURE — 84295 ASSAY OF SERUM SODIUM: CPT

## 2024-01-01 PROCEDURE — 87040 BLOOD CULTURE FOR BACTERIA: CPT

## 2024-01-01 PROCEDURE — 80048 BASIC METABOLIC PNL TOTAL CA: CPT

## 2024-01-01 PROCEDURE — 84145 PROCALCITONIN (PCT): CPT

## 2024-01-01 PROCEDURE — 36430 TRANSFUSION BLD/BLD COMPNT: CPT

## 2024-01-01 PROCEDURE — 93005 ELECTROCARDIOGRAM TRACING: CPT

## 2024-01-01 PROCEDURE — 85014 HEMATOCRIT: CPT

## 2024-01-01 PROCEDURE — 86880 COOMBS TEST DIRECT: CPT

## 2024-01-01 PROCEDURE — 83880 ASSAY OF NATRIURETIC PEPTIDE: CPT

## 2024-01-01 PROCEDURE — 82330 ASSAY OF CALCIUM: CPT

## 2024-01-01 PROCEDURE — 83605 ASSAY OF LACTIC ACID: CPT

## 2024-01-01 PROCEDURE — 83010 ASSAY OF HAPTOGLOBIN QUANT: CPT

## 2024-01-01 PROCEDURE — 86901 BLOOD TYPING SEROLOGIC RH(D): CPT

## 2024-01-01 PROCEDURE — 85025 COMPLETE CBC W/AUTO DIFF WBC: CPT

## 2024-01-01 PROCEDURE — 85018 HEMOGLOBIN: CPT

## 2024-01-01 PROCEDURE — 82550 ASSAY OF CK (CPK): CPT

## 2024-01-01 PROCEDURE — 97161 PT EVAL LOW COMPLEX 20 MIN: CPT | Mod: GP

## 2024-01-01 PROCEDURE — 86900 BLOOD TYPING SEROLOGIC ABO: CPT

## 2024-01-01 PROCEDURE — 86850 RBC ANTIBODY SCREEN: CPT

## 2024-01-01 PROCEDURE — 0241U: CPT

## 2024-01-01 PROCEDURE — 99214 OFFICE O/P EST MOD 30 MIN: CPT

## 2024-01-01 PROCEDURE — 93010 ELECTROCARDIOGRAM REPORT: CPT

## 2024-01-01 PROCEDURE — 76770 US EXAM ABDO BACK WALL COMP: CPT

## 2024-01-01 RX ORDER — LACOSAMIDE 100 MG/1
200 TABLET, FILM COATED ORAL
Refills: 0 | Status: DISCONTINUED | OUTPATIENT
Start: 2024-01-01 | End: 2025-01-01

## 2024-01-01 RX ORDER — CEFTRIAXONE SODIUM 1 G/1
1000 INJECTION, POWDER, FOR SOLUTION INTRAMUSCULAR; INTRAVENOUS ONCE
Refills: 0 | Status: COMPLETED | OUTPATIENT
Start: 2024-01-01 | End: 2024-01-01

## 2024-01-01 RX ORDER — INSULIN HUMAN 100 [IU]/ML
10 INJECTION, SOLUTION SUBCUTANEOUS ONCE
Refills: 0 | Status: COMPLETED | OUTPATIENT
Start: 2024-01-01 | End: 2024-01-01

## 2024-01-01 RX ORDER — ACETAMINOPHEN 80 MG/.8ML
650 SOLUTION/ DROPS ORAL ONCE
Refills: 0 | Status: COMPLETED | OUTPATIENT
Start: 2024-01-01 | End: 2024-01-01

## 2024-01-01 RX ORDER — LEVETIRACETAM 100 MG/ML
1500 SOLUTION ORAL
Refills: 0 | Status: DISCONTINUED | OUTPATIENT
Start: 2024-01-01 | End: 2025-01-01

## 2024-01-01 RX ORDER — PANTOPRAZOLE 40 MG/1
40 TABLET, DELAYED RELEASE ORAL DAILY
Refills: 0 | Status: DISCONTINUED | OUTPATIENT
Start: 2024-01-01 | End: 2025-01-01

## 2024-01-01 RX ORDER — CEFTRIAXONE SODIUM 1 G/1
1000 INJECTION, POWDER, FOR SOLUTION INTRAMUSCULAR; INTRAVENOUS EVERY 24 HOURS
Refills: 0 | Status: DISCONTINUED | OUTPATIENT
Start: 2024-01-01 | End: 2025-01-01

## 2024-01-01 RX ORDER — SODIUM CHLORIDE 9 MG/ML
1000 INJECTION, SOLUTION INTRAVENOUS
Refills: 0 | Status: DISCONTINUED | OUTPATIENT
Start: 2024-01-01 | End: 2025-01-01

## 2024-01-01 RX ORDER — INSULIN GLARGINE-YFGN 100 [IU]/ML
5 INJECTION, SOLUTION SUBCUTANEOUS AT BEDTIME
Refills: 0 | Status: DISCONTINUED | OUTPATIENT
Start: 2024-01-01 | End: 2025-01-01

## 2024-01-01 RX ORDER — DEXTROSE MONOHYDRATE 25 G/50ML
12.5 INJECTION, SOLUTION INTRAVENOUS ONCE
Refills: 0 | Status: DISCONTINUED | OUTPATIENT
Start: 2024-01-01 | End: 2025-01-01

## 2024-01-01 RX ORDER — DEXTROSE MONOHYDRATE 25 G/50ML
15 INJECTION, SOLUTION INTRAVENOUS ONCE
Refills: 0 | Status: DISCONTINUED | OUTPATIENT
Start: 2024-01-01 | End: 2025-01-01

## 2024-01-01 RX ORDER — AMANTADINE HYDROCHLORIDE 100 MG/1
50 CAPSULE ORAL
Refills: 0 | Status: DISCONTINUED | OUTPATIENT
Start: 2024-01-01 | End: 2025-01-01

## 2024-01-01 RX ORDER — ALBUTEROL SULFATE 90 UG/1
2 INHALANT RESPIRATORY (INHALATION) EVERY 6 HOURS
Refills: 0 | Status: DISCONTINUED | OUTPATIENT
Start: 2024-01-01 | End: 2025-01-01

## 2024-01-01 RX ORDER — DEXTROSE MONOHYDRATE 25 G/50ML
50 INJECTION, SOLUTION INTRAVENOUS ONCE
Refills: 0 | Status: COMPLETED | OUTPATIENT
Start: 2024-01-01 | End: 2024-01-01

## 2024-01-01 RX ORDER — SODIUM CHLORIDE 9 MG/ML
1000 INJECTION, SOLUTION INTRAMUSCULAR; INTRAVENOUS; SUBCUTANEOUS
Refills: 0 | Status: DISCONTINUED | OUTPATIENT
Start: 2024-01-01 | End: 2025-01-01

## 2024-01-01 RX ORDER — DEXTROSE MONOHYDRATE 25 G/50ML
25 INJECTION, SOLUTION INTRAVENOUS ONCE
Refills: 0 | Status: DISCONTINUED | OUTPATIENT
Start: 2024-01-01 | End: 2025-01-01

## 2024-01-01 RX ORDER — HEPARIN SODIUM 1000 [USP'U]/ML
5000 INJECTION, SOLUTION INTRAVENOUS; SUBCUTANEOUS EVERY 12 HOURS
Refills: 0 | Status: DISCONTINUED | OUTPATIENT
Start: 2024-01-01 | End: 2025-01-01

## 2024-01-01 RX ORDER — FUROSEMIDE 20 MG
20 TABLET ORAL ONCE
Refills: 0 | Status: COMPLETED | OUTPATIENT
Start: 2024-01-01 | End: 2024-01-01

## 2024-01-01 RX ORDER — INSULIN LISPRO 100/ML
2 VIAL (ML) SUBCUTANEOUS
Refills: 0 | Status: DISCONTINUED | OUTPATIENT
Start: 2024-01-01 | End: 2025-01-01

## 2024-01-01 RX ORDER — INSULIN LISPRO 100/ML
VIAL (ML) SUBCUTANEOUS
Refills: 0 | Status: DISCONTINUED | OUTPATIENT
Start: 2024-01-01 | End: 2025-01-01

## 2024-01-01 RX ORDER — SENNOSIDES 8.6 MG/1
2 TABLET, FILM COATED ORAL AT BEDTIME
Refills: 0 | Status: DISCONTINUED | OUTPATIENT
Start: 2024-01-01 | End: 2025-01-01

## 2024-01-01 RX ORDER — AMPICILLIN TRIHYDRATE 125 MG/5ML
2 SUSPENSION, RECONSTITUTED, ORAL (ML) ORAL EVERY 6 HOURS
Refills: 0 | Status: DISCONTINUED | OUTPATIENT
Start: 2024-01-01 | End: 2025-01-01

## 2024-01-01 RX ORDER — SODIUM CHLORIDE 9 MG/ML
1000 INJECTION, SOLUTION INTRAMUSCULAR; INTRAVENOUS; SUBCUTANEOUS ONCE
Refills: 0 | Status: COMPLETED | OUTPATIENT
Start: 2024-01-01 | End: 2024-01-01

## 2024-01-01 RX ORDER — GLUCAGON INJECTION, SOLUTION 0.5 MG/.1ML
1 INJECTION, SOLUTION SUBCUTANEOUS ONCE
Refills: 0 | Status: DISCONTINUED | OUTPATIENT
Start: 2024-01-01 | End: 2025-01-01

## 2024-01-01 RX ORDER — SODIUM CHLORIDE 9 MG/ML
500 INJECTION, SOLUTION INTRAMUSCULAR; INTRAVENOUS; SUBCUTANEOUS ONCE
Refills: 0 | Status: DISCONTINUED | OUTPATIENT
Start: 2024-01-01 | End: 2024-01-01

## 2024-01-01 RX ORDER — SODIUM POLYSTYRENE SULFONATE 30 G/120ML
15 ENEMA (ML) RECTAL ONCE
Refills: 0 | Status: COMPLETED | OUTPATIENT
Start: 2024-01-01 | End: 2024-01-01

## 2024-01-01 RX ORDER — CALCIUM GLUCONATE 94 MG/ML
2 INJECTION, SOLUTION INTRAVENOUS ONCE
Refills: 0 | Status: COMPLETED | OUTPATIENT
Start: 2024-01-01 | End: 2024-01-01

## 2024-01-01 RX ORDER — SODIUM ZIRCONIUM CYCLOSILICATE 10 G/10G
10 POWDER, FOR SUSPENSION ORAL EVERY 8 HOURS
Refills: 0 | Status: DISCONTINUED | OUTPATIENT
Start: 2024-01-01 | End: 2025-01-01

## 2024-01-01 RX ADMIN — CEFTRIAXONE SODIUM 100 MILLIGRAM(S): 1 INJECTION, POWDER, FOR SOLUTION INTRAMUSCULAR; INTRAVENOUS at 22:25

## 2024-01-01 RX ADMIN — CALCIUM GLUCONATE 200 GRAM(S): 94 INJECTION, SOLUTION INTRAVENOUS at 20:18

## 2024-01-01 RX ADMIN — SODIUM CHLORIDE 1000 MILLILITER(S): 9 INJECTION, SOLUTION INTRAMUSCULAR; INTRAVENOUS; SUBCUTANEOUS at 22:00

## 2024-01-01 RX ADMIN — Medication 15 GRAM(S): at 20:48

## 2024-01-01 RX ADMIN — ACETAMINOPHEN 650 MILLIGRAM(S): 80 SOLUTION/ DROPS ORAL at 21:43

## 2024-01-01 RX ADMIN — DEXTROSE MONOHYDRATE 50 MILLILITER(S): 25 INJECTION, SOLUTION INTRAVENOUS at 20:19

## 2024-01-01 RX ADMIN — INSULIN HUMAN 10 UNIT(S): 100 INJECTION, SOLUTION SUBCUTANEOUS at 20:18

## 2024-01-01 RX ADMIN — Medication 20 MILLIGRAM(S): at 23:04

## 2024-01-04 ENCOUNTER — EMERGENCY (EMERGENCY)
Facility: HOSPITAL | Age: 79
LOS: 0 days | Discharge: HOME | End: 2024-01-04
Attending: EMERGENCY MEDICINE
Payer: MEDICARE

## 2024-01-04 ENCOUNTER — APPOINTMENT (OUTPATIENT)
Dept: INFUSION THERAPY | Facility: CLINIC | Age: 79
End: 2024-01-04
Payer: MEDICARE

## 2024-01-04 ENCOUNTER — OUTPATIENT (OUTPATIENT)
Dept: OUTPATIENT SERVICES | Facility: HOSPITAL | Age: 79
LOS: 1 days | End: 2024-01-04
Payer: MEDICARE

## 2024-01-04 ENCOUNTER — APPOINTMENT (OUTPATIENT)
Dept: HEMATOLOGY ONCOLOGY | Facility: CLINIC | Age: 79
End: 2024-01-04
Payer: MEDICARE

## 2024-01-04 ENCOUNTER — LABORATORY RESULT (OUTPATIENT)
Age: 79
End: 2024-01-04

## 2024-01-04 VITALS
OXYGEN SATURATION: 97 % | DIASTOLIC BLOOD PRESSURE: 69 MMHG | SYSTOLIC BLOOD PRESSURE: 154 MMHG | HEART RATE: 97 BPM | WEIGHT: 188.05 LBS | RESPIRATION RATE: 16 BRPM | TEMPERATURE: 98 F | HEIGHT: 62 IN

## 2024-01-04 VITALS
DIASTOLIC BLOOD PRESSURE: 74 MMHG | TEMPERATURE: 97.9 F | BODY MASS INDEX: 25.24 KG/M2 | SYSTOLIC BLOOD PRESSURE: 143 MMHG | WEIGHT: 138 LBS | HEART RATE: 100 BPM

## 2024-01-04 VITALS
SYSTOLIC BLOOD PRESSURE: 133 MMHG | HEART RATE: 88 BPM | RESPIRATION RATE: 18 BRPM | TEMPERATURE: 98 F | OXYGEN SATURATION: 97 % | DIASTOLIC BLOOD PRESSURE: 58 MMHG

## 2024-01-04 DIAGNOSIS — Z98.890 OTHER SPECIFIED POSTPROCEDURAL STATES: Chronic | ICD-10-CM

## 2024-01-04 DIAGNOSIS — D64.9 ANEMIA, UNSPECIFIED: ICD-10-CM

## 2024-01-04 DIAGNOSIS — E11.9 TYPE 2 DIABETES MELLITUS WITHOUT COMPLICATIONS: ICD-10-CM

## 2024-01-04 DIAGNOSIS — D46.9 MYELODYSPLASTIC SYNDROME, UNSPECIFIED: ICD-10-CM

## 2024-01-04 DIAGNOSIS — N28.9 DISORDER OF KIDNEY AND URETER, UNSPECIFIED: ICD-10-CM

## 2024-01-04 LAB
ALBUMIN SERPL ELPH-MCNC: 4.3 G/DL — SIGNIFICANT CHANGE UP (ref 3.5–5.2)
ALBUMIN SERPL ELPH-MCNC: 4.3 G/DL — SIGNIFICANT CHANGE UP (ref 3.5–5.2)
ALP SERPL-CCNC: 119 U/L — HIGH (ref 30–115)
ALP SERPL-CCNC: 119 U/L — HIGH (ref 30–115)
ALT FLD-CCNC: 25 U/L — SIGNIFICANT CHANGE UP (ref 0–41)
ALT FLD-CCNC: 25 U/L — SIGNIFICANT CHANGE UP (ref 0–41)
ANION GAP SERPL CALC-SCNC: 11 MMOL/L — SIGNIFICANT CHANGE UP (ref 7–14)
ANION GAP SERPL CALC-SCNC: 11 MMOL/L — SIGNIFICANT CHANGE UP (ref 7–14)
APTT BLD: 30 SEC — SIGNIFICANT CHANGE UP (ref 27–39.2)
APTT BLD: 30 SEC — SIGNIFICANT CHANGE UP (ref 27–39.2)
AST SERPL-CCNC: 20 U/L — SIGNIFICANT CHANGE UP (ref 0–41)
AST SERPL-CCNC: 20 U/L — SIGNIFICANT CHANGE UP (ref 0–41)
BASOPHILS # BLD AUTO: 0.18 K/UL — SIGNIFICANT CHANGE UP (ref 0–0.2)
BASOPHILS # BLD AUTO: 0.18 K/UL — SIGNIFICANT CHANGE UP (ref 0–0.2)
BASOPHILS NFR BLD AUTO: 3.3 % — HIGH (ref 0–1)
BASOPHILS NFR BLD AUTO: 3.3 % — HIGH (ref 0–1)
BILIRUB SERPL-MCNC: 0.4 MG/DL — SIGNIFICANT CHANGE UP (ref 0.2–1.2)
BILIRUB SERPL-MCNC: 0.4 MG/DL — SIGNIFICANT CHANGE UP (ref 0.2–1.2)
BUN SERPL-MCNC: 36 MG/DL — HIGH (ref 10–20)
BUN SERPL-MCNC: 36 MG/DL — HIGH (ref 10–20)
CALCIUM SERPL-MCNC: 9 MG/DL — SIGNIFICANT CHANGE UP (ref 8.4–10.5)
CALCIUM SERPL-MCNC: 9 MG/DL — SIGNIFICANT CHANGE UP (ref 8.4–10.5)
CHLORIDE SERPL-SCNC: 103 MMOL/L — SIGNIFICANT CHANGE UP (ref 98–110)
CHLORIDE SERPL-SCNC: 103 MMOL/L — SIGNIFICANT CHANGE UP (ref 98–110)
CO2 SERPL-SCNC: 22 MMOL/L — SIGNIFICANT CHANGE UP (ref 17–32)
CO2 SERPL-SCNC: 22 MMOL/L — SIGNIFICANT CHANGE UP (ref 17–32)
CREAT SERPL-MCNC: 1.5 MG/DL — SIGNIFICANT CHANGE UP (ref 0.7–1.5)
CREAT SERPL-MCNC: 1.5 MG/DL — SIGNIFICANT CHANGE UP (ref 0.7–1.5)
EGFR: 36 ML/MIN/1.73M2 — LOW
EGFR: 36 ML/MIN/1.73M2 — LOW
EOSINOPHIL # BLD AUTO: 0.09 K/UL — SIGNIFICANT CHANGE UP (ref 0–0.7)
EOSINOPHIL # BLD AUTO: 0.09 K/UL — SIGNIFICANT CHANGE UP (ref 0–0.7)
EOSINOPHIL NFR BLD AUTO: 1.6 % — SIGNIFICANT CHANGE UP (ref 0–8)
EOSINOPHIL NFR BLD AUTO: 1.6 % — SIGNIFICANT CHANGE UP (ref 0–8)
GAS PNL BLDV: SIGNIFICANT CHANGE UP
GAS PNL BLDV: SIGNIFICANT CHANGE UP
GLUCOSE SERPL-MCNC: 335 MG/DL — HIGH (ref 70–99)
GLUCOSE SERPL-MCNC: 335 MG/DL — HIGH (ref 70–99)
HCT VFR BLD CALC: 16.5 % — LOW (ref 37–47)
HCT VFR BLD CALC: 16.5 % — LOW (ref 37–47)
HCT VFR BLD CALC: 17 %
HGB BLD-MCNC: 5.4 G/DL — CRITICAL LOW (ref 12–16)
HGB BLD-MCNC: 5.4 G/DL — CRITICAL LOW (ref 12–16)
HGB BLD-MCNC: 5.8 G/DL
INR BLD: 1.08 RATIO — SIGNIFICANT CHANGE UP (ref 0.65–1.3)
INR BLD: 1.08 RATIO — SIGNIFICANT CHANGE UP (ref 0.65–1.3)
LYMPHOCYTES # BLD AUTO: 0.83 K/UL — LOW (ref 1.2–3.4)
LYMPHOCYTES # BLD AUTO: 0.83 K/UL — LOW (ref 1.2–3.4)
LYMPHOCYTES # BLD AUTO: 14.8 % — LOW (ref 20.5–51.1)
LYMPHOCYTES # BLD AUTO: 14.8 % — LOW (ref 20.5–51.1)
MAGNESIUM SERPL-MCNC: 1.8 MG/DL — SIGNIFICANT CHANGE UP (ref 1.8–2.4)
MAGNESIUM SERPL-MCNC: 1.8 MG/DL — SIGNIFICANT CHANGE UP (ref 1.8–2.4)
MCHC RBC-ENTMCNC: 29 PG — SIGNIFICANT CHANGE UP (ref 27–31)
MCHC RBC-ENTMCNC: 29 PG — SIGNIFICANT CHANGE UP (ref 27–31)
MCHC RBC-ENTMCNC: 29.9 PG
MCHC RBC-ENTMCNC: 32.7 G/DL — SIGNIFICANT CHANGE UP (ref 32–37)
MCHC RBC-ENTMCNC: 32.7 G/DL — SIGNIFICANT CHANGE UP (ref 32–37)
MCHC RBC-ENTMCNC: 34.1 G/DL
MCV RBC AUTO: 87.6 FL
MCV RBC AUTO: 88.7 FL — SIGNIFICANT CHANGE UP (ref 81–99)
MCV RBC AUTO: 88.7 FL — SIGNIFICANT CHANGE UP (ref 81–99)
MONOCYTES # BLD AUTO: 0.55 K/UL — SIGNIFICANT CHANGE UP (ref 0.1–0.6)
MONOCYTES # BLD AUTO: 0.55 K/UL — SIGNIFICANT CHANGE UP (ref 0.1–0.6)
MONOCYTES NFR BLD AUTO: 9.8 % — HIGH (ref 1.7–9.3)
MONOCYTES NFR BLD AUTO: 9.8 % — HIGH (ref 1.7–9.3)
NEUTROPHILS # BLD AUTO: 3.75 K/UL — SIGNIFICANT CHANGE UP (ref 1.4–6.5)
NEUTROPHILS # BLD AUTO: 3.75 K/UL — SIGNIFICANT CHANGE UP (ref 1.4–6.5)
NEUTROPHILS NFR BLD AUTO: 67.2 % — SIGNIFICANT CHANGE UP (ref 42.2–75.2)
NEUTROPHILS NFR BLD AUTO: 67.2 % — SIGNIFICANT CHANGE UP (ref 42.2–75.2)
PLATELET # BLD AUTO: 366 K/UL — SIGNIFICANT CHANGE UP (ref 130–400)
PLATELET # BLD AUTO: 366 K/UL — SIGNIFICANT CHANGE UP (ref 130–400)
PLATELET # BLD AUTO: 392 K/UL
PMV BLD: 10 FL
PMV BLD: 10.2 FL — SIGNIFICANT CHANGE UP (ref 7.4–10.4)
PMV BLD: 10.2 FL — SIGNIFICANT CHANGE UP (ref 7.4–10.4)
POTASSIUM SERPL-MCNC: 6.1 MMOL/L — CRITICAL HIGH (ref 3.5–5)
POTASSIUM SERPL-MCNC: 6.1 MMOL/L — CRITICAL HIGH (ref 3.5–5)
POTASSIUM SERPL-SCNC: 6.1 MMOL/L — CRITICAL HIGH (ref 3.5–5)
POTASSIUM SERPL-SCNC: 6.1 MMOL/L — CRITICAL HIGH (ref 3.5–5)
PROT SERPL-MCNC: 6.2 G/DL — SIGNIFICANT CHANGE UP (ref 6–8)
PROT SERPL-MCNC: 6.2 G/DL — SIGNIFICANT CHANGE UP (ref 6–8)
PROTHROM AB SERPL-ACNC: 12.3 SEC — SIGNIFICANT CHANGE UP (ref 9.95–12.87)
PROTHROM AB SERPL-ACNC: 12.3 SEC — SIGNIFICANT CHANGE UP (ref 9.95–12.87)
RBC # BLD: 1.86 M/UL — LOW (ref 4.2–5.4)
RBC # BLD: 1.86 M/UL — LOW (ref 4.2–5.4)
RBC # BLD: 1.94 M/UL
RBC # FLD: 18.9 %
RBC # FLD: 19 % — HIGH (ref 11.5–14.5)
RBC # FLD: 19 % — HIGH (ref 11.5–14.5)
SODIUM SERPL-SCNC: 136 MMOL/L — SIGNIFICANT CHANGE UP (ref 135–146)
SODIUM SERPL-SCNC: 136 MMOL/L — SIGNIFICANT CHANGE UP (ref 135–146)
WBC # BLD: 5.58 K/UL — SIGNIFICANT CHANGE UP (ref 4.8–10.8)
WBC # BLD: 5.58 K/UL — SIGNIFICANT CHANGE UP (ref 4.8–10.8)
WBC # FLD AUTO: 5.58 K/UL — SIGNIFICANT CHANGE UP (ref 4.8–10.8)
WBC # FLD AUTO: 5.58 K/UL — SIGNIFICANT CHANGE UP (ref 4.8–10.8)
WBC # FLD AUTO: 6.95 K/UL

## 2024-01-04 PROCEDURE — 85014 HEMATOCRIT: CPT

## 2024-01-04 PROCEDURE — 80053 COMPREHEN METABOLIC PANEL: CPT

## 2024-01-04 PROCEDURE — 96372 THER/PROPH/DIAG INJ SC/IM: CPT

## 2024-01-04 PROCEDURE — 99214 OFFICE O/P EST MOD 30 MIN: CPT

## 2024-01-04 PROCEDURE — 82803 BLOOD GASES ANY COMBINATION: CPT

## 2024-01-04 PROCEDURE — 86850 RBC ANTIBODY SCREEN: CPT

## 2024-01-04 PROCEDURE — 83735 ASSAY OF MAGNESIUM: CPT

## 2024-01-04 PROCEDURE — 99285 EMERGENCY DEPT VISIT HI MDM: CPT | Mod: 25

## 2024-01-04 PROCEDURE — 84132 ASSAY OF SERUM POTASSIUM: CPT

## 2024-01-04 PROCEDURE — 36430 TRANSFUSION BLD/BLD COMPNT: CPT

## 2024-01-04 PROCEDURE — 85610 PROTHROMBIN TIME: CPT

## 2024-01-04 PROCEDURE — 93010 ELECTROCARDIOGRAM REPORT: CPT

## 2024-01-04 PROCEDURE — 86900 BLOOD TYPING SEROLOGIC ABO: CPT

## 2024-01-04 PROCEDURE — 86923 COMPATIBILITY TEST ELECTRIC: CPT

## 2024-01-04 PROCEDURE — 85027 COMPLETE CBC AUTOMATED: CPT

## 2024-01-04 PROCEDURE — 85018 HEMOGLOBIN: CPT

## 2024-01-04 PROCEDURE — 99285 EMERGENCY DEPT VISIT HI MDM: CPT

## 2024-01-04 PROCEDURE — 82330 ASSAY OF CALCIUM: CPT

## 2024-01-04 PROCEDURE — 85025 COMPLETE CBC W/AUTO DIFF WBC: CPT

## 2024-01-04 PROCEDURE — 96401 CHEMO ANTI-NEOPL SQ/IM: CPT

## 2024-01-04 PROCEDURE — 93005 ELECTROCARDIOGRAM TRACING: CPT

## 2024-01-04 PROCEDURE — 86901 BLOOD TYPING SEROLOGIC RH(D): CPT

## 2024-01-04 PROCEDURE — 85730 THROMBOPLASTIN TIME PARTIAL: CPT

## 2024-01-04 PROCEDURE — P9040: CPT

## 2024-01-04 PROCEDURE — 84295 ASSAY OF SERUM SODIUM: CPT

## 2024-01-04 PROCEDURE — 36415 COLL VENOUS BLD VENIPUNCTURE: CPT

## 2024-01-04 PROCEDURE — 83605 ASSAY OF LACTIC ACID: CPT

## 2024-01-04 RX ORDER — LUSPATERCEPT 75 MG/1
110 INJECTION, POWDER, LYOPHILIZED, FOR SOLUTION SUBCUTANEOUS ONCE
Refills: 0 | Status: COMPLETED | OUTPATIENT
Start: 2024-01-04 | End: 2024-01-04

## 2024-01-04 RX ORDER — PREGABALIN 225 MG/1
1000 CAPSULE ORAL ONCE
Refills: 0 | Status: COMPLETED | OUTPATIENT
Start: 2024-01-04 | End: 2024-01-04

## 2024-01-04 RX ADMIN — LUSPATERCEPT 110 MILLIGRAM(S): 75 INJECTION, POWDER, LYOPHILIZED, FOR SOLUTION SUBCUTANEOUS at 12:19

## 2024-01-04 RX ADMIN — PREGABALIN 1000 MICROGRAM(S): 225 CAPSULE ORAL at 12:19

## 2024-01-04 NOTE — HISTORY OF PRESENT ILLNESS
[de-identified] : This is a 75 year old  female with history of MDS. She's was previously treated with Revlimid 5 mg, 2 weeks on, 1 week off.  \par  She is also on Procrit.\par   She underwent colonoscopy (2/2017)  Results noted no colitis, only hyperplastic polyp noted. \par   \par   [de-identified] : She missed on appointment for procrit last week. She starts her next cycle on 6/25/18 C/o back pain radiating down her left which occurred when she bent to  something. No change in diarrhea.  7/31/18: Doing well. On Revlimid for more than 2yrs, 5 mg 2 weeks on 1 week off. She will be starting week on tonight.  C/o diarrhea. On Imodium 2 pills AM and 2 pills PM. Doesn't help much with diarrhea.  C/o nausea, abdominal pain.    Colonoscopy in 2016 was normal.  Did not have blood transfusion for over 2 years.  On procrit [id number] weekly. Missed last week on 7/24/18 as she was out of town. She has been non compliant with weekly Procrit. Mammogram in 2017 was normal.   9/11/18 pt is here for follow up. She feels the lomotil helps with the diarrhea. She was away for a few weeks and missed her procrit injections. No fever, abdominal  discomfort has been less since since use of lomotil. She started a new cycle 2 days ago.  10/2/18: She will start Revlimid tonight (week on). 2 weeks on, 1 week off.  On ASA 81mg.  Denies fever, nausea, vomiting, chest pain, SOB, abdominal pain, and bladder problems. C/o diarrhea.  S/p 2 units PRBC transfusion on 9/25/18.  On Procrit 97926 units weekly. Not compliant every week.  C/o intermittent dizziness.   10/31/18 Pt is here for follow up. C/O significant fatigue. Continues to have diarrhea, takes imodium and lomotil as needed. C/o dry cough, no fever. Cough started a month ago. It is worse in the mornings however over last week it has been constant all day. No chest pain. She was found to have low B12 levels and was started on B12 injections.  11/27/18: Doing well. Hospitalized for 3 days for cellulitis/abcess of the back s/p drainage and on Abx currently. Developed 3 days after Mg infusion as per pt.  Denies nausea, vomiting, chest pain, SOB, abdominal pain, bowel and bladder problems. This is the week on Revlimid (week 1). S/p 1 unit PRBC transfusion in the hospital.  On Procrit weekly   12/27/18: Doing well. No major complaints. Denies fever, nausea, vomiting, chest pain, SOB, abdominal pain, and bladder problems. On Revlimid 5 mg 2 weeks on 1 week off. This is the week off. She will start the week on sunday (12/30/18). Due for Procrit 07625 units today.  Still has diarrhea. 4-5bm/day. On monthly B12 injections.   1/30/19: Patient here for follow up for MDS.  She is taking Revlimid 5 mg, 2 weeks on, 1 week off.  She will complete this current cycle on Saturday.  She has no new complaints today.  Patient denies cough, shortness of breath, denies fever, denies bone pain.  03/04/2019 Patient is here for a follow up visit. She complaints of severe pain in her left shoulder and has had abscess drained 2 weeks ago. However the pain is worse and she has purulent discharge on examination. She also has Nasal sores that are painful. Culture from 11/2018 showed MRSA.  Denies Fever.  She also complaints of swelling in her right leg. Not tender and not erythematous and negative Gong;s sign.  Her diarrhea with Revlimid is ongoing and Imodium and Lomotil helps but not everyday.  She is on 60,000 units of procrit weekly and Revlimid 5 mg 2 weeks on 1 week off.   4/23/19 Pt is here for follow up. She feels well. The nasal sores have improved with bactroban The abscess on left shoulder has resolved. However she has a new one on the middle of her back. No fever. Pt has been on procrit 39697 units weekly, however she missed a dose in between.  5/8/19 pt is here for follow up. no new complaints. feels well. Her skin abscess has resolved. she has been unable to go to ID, as she could not get an appt. No bleeding. She is compliant with revlimid.  6/6/19 Pt is here for follow up. no new complaints  Feels well. Is on week 2 of her Revlimid cycle.  No transfusions since last visit  7/17/19 Pt is here for follow up. C/O fatigue. Was away for a few days two weeks ago, but missed treatment with procrit for 2 weeks. Is complaint with Revlimid 2 weeks on 1 week off. Diarrhea is a little improved.  8/14/19 Patient here for follow up visit, feeling well.  Although she is complaining of some pain at the left scapula area recently.  Patient denies cough, shortness of breath, denies fever, denies other bone pain.  She is off Revlimid  this week, due to restart on Monday.  She is scheduled for Procrit and Vitamin B12 injection today.  She plans to leave the country tomorrow to visit her mother who is ill.  She will return in about 10 days.  9/11/19 Pt is here for follow up. She was away for 3 weeks, out of which she took procrit for 2 weeks in [place]. She missed last week's dose. She had CBCs in [place] which showed Hgb of 8.9 She feels well. She still getting diarrhea. She has been using lomotil with very minimal relief. She started a new cycle of Revlimid 4 days ago.  10/3/19 Patient here for follow up visit, feeling fairly well.   Patient denies cough, shortness of breath, denies fever, denies other bone pain.  She remains on Revlimid.  She is scheduled for Procrit and Vitamin B12 injection today.  10/24/19 Pt is here for follow up. Feels well. No SOB, fatigue. Compliant with procrit and Revl;imid. Remains on ASa. Diarrhea is unchanged. Pt takes imodium as needed.  11/14/19 Pt is here for follow up. No new complaints. Starts a new cycle of Revlimid today. Diarrhea is same as before, no rectal bleeding. No fever.  12/5/19 Pt is here for follow up. No new complaints. Denies feeling weaker than usual. No fever, SOB. No change in frequency of diarrhea. No pain. Will start next cycle of Revlimid in 3 days.   !/16/20 Pt was recently DCed from [facility] 3 days ago after being treated for pneumonia and MARCO. She is on po Levaquin. She restarted Revlmid 3 days ago. She is feeling better now. No fever. No SOB, Chest pain and back pain has resolved. Pt has a cough, no expectoration. She also recd a unit of PRBCs last week in the hospital   1/30/20 Patient here for follow up visit, feeling well.  She has no new complaints. Cough is almost gone completely.  Patient denies shortness of breath, denies fever, denies bone pain.  Her appetite is ok, weight is stable.  She is due to restart Revlimid on Monday.  02/20/20 Pt is here for follow up, feeling well. Offers no new complaints. Denies SOB, fever, chills, weight loss, CP, cough.  Currently off week of Revlimid 5 mg. Restarts on Monday. Has procrit 80,000 units today. Next b 12 injection due in 2 weeks  Did not get chest Xray CBC reviewed WBC 3.1, h/h 8.3/25%, plt 386, neutrophils 1.29  3/12/20 Pt is here for follow up. She took Revlimid for 2 weeks and then has been off for one week although she was advised to take it daily without break. No SOB, Cough, fever. Has mild fatigue.  04/02/2020 SIMONA KUHN a 75 year F is here today for follow up visit of MDS. Denies fever, chills, cough,night sweats, weight loss.  Denies bleeding or bruising. Pt receives procrit 80,000 units weekly and B12 IM monthly.  Continued Revlimid 5 mg daily x 3 weeks, has 1 dose left tonight.  CBC reviewed: WBC 3.2, H/H 8.1/25.2, neutrophils 1.6, PLT 72  4/20/2020: The patient is here for follow up . She has no complaints of fever, chills, dizziness , chest pain and shortness of breath . She is still with diarrhea , up to 10 watery BMs per day, responds poorly to imodium and lomotil. She is on Revlimid 5 mg daily , took last dose yesterday night. Her last Procrit was on April 13.   5/26/20 Pt is here for follow up. She is feeling well. Denies SOB, chest pain, fever. Continues to have diarrhea although she is off of Revlmid. Thinks it may due to diabetic meds. Wants to discuss BM bx results  6/22/20 Pt is here for follow up. Feels well. Denies any fever, N, V, D Continues to have diarrhea, she will talk to her PCP about changing metformin for DM. Has been needing PRBCs 1 unit each month  7/20/20 Pt is here for follow up. No new complaints. Has been feeling well. No SOB, CP.  No N, V, D. She has recd 2 units of PRBCs since May 20.  8/17/20 Pt is here for a follow up visit. She is feeling well. No new symptoms. No N, V, D. No bruising or bleeding. She continues to need PRBCs every 2-3 weeks.  8/31/20 Pt is here for follow up. She is feeling well. No N, V, D. She is tolerating the hydrea well. No SOB, CP No bruising ior bleeding  9/8/20 Pt is here for follow up. She had been contacted by the [facility] last week to advise her to stop the hydrea. Pt did not check her messages and continued with Hydrea. No fever, N, V, bleeding. Saw Dr Ferrell last week.  9/14/20 Pt is here for folow up. Besides her usual complaint of diarrhea she is feeling well. Recd 1 unit PRBC last week. Has stopped Hydrea. No fever, mouth sores.  9/29/20 Pt is here for folow up. No new complaints. Is seeing GI for diarrhea net week. No fever, night sweats. REcd 3 units of PRBC this month  10/13/20 Pt is here for follow up. No new complaints. Has not needed a transfusion since 3 weeks ago. Continues to have diarrhea, waiting to be seen by GI  10/26/20 Pt is here for follow up. C/O feeling fatigued. Has CLARK. No nausea or vomiting. No fever. Diarrhea has improved a little. She is no longer on Metformin  11/9/20 Pt is here for follow up. Feels well. Denies SOB, CP, fatigue  12/7/20- Patient is for follow up and is due for cycle 9 of Vidaza.  She still has loose BM sometimes 10 times a day and was seeing Dr. Corey from GI- did not follow up recently and is unable to get an appt with him. She has lost about 10 lbs since September. No N/V. No fever or chills. No CP/SOB. She has been getting PRBC transfusion every 3 weeks now  01/04/20 Pt presented today for f/u visit . She is s/p 8 cycles of vidaza and was started on Luspatercept in Dec , 2020 . So far she received 1 injection , she is due for 2nd injection today . Adverse effect profile was discussed with her in detail , she reports having some dizziness and weakness after first injection . She received blood transfusion last wk . Blood work from today reviewed as well and counts are adequate . She denies any fevers,  chills,  or any new symptoms .   1/25/21 Pt is here for follow up. C/O diarrhea, otherwise feeling better. Has not needed a transfusion since 12/29 2/16/21 Pt is here for follow up. Needed transfusion on 2/8/21. Continues to C/o fatigue. Diarrhea remains the same, has not made GI appt as yet.  3/8/21 Pt is here for follow up. Feels better today. Recd 1 unit PRBCs last week. Diarrhea is same as before. has an appt with GI next week. No fever  3/30/21 Pt is here for follow up. Feels better. Last transfusion was on 3/2/21 Saw GI and was started on cholestyramine and metformin was DCED with resolution of diarrhea. She denies any SOB, Fatigue is better  4/20/2021 Pt is here to f/u for MDS She is s/p Luspatercept cycle #6 She is compliant with Aspirin She feels better today, appetite is good She denies shortness of breath, fatigue, or weakness  She c/o of diarrhea but it has improved since she was started on Cholestyramine. Stool is soft and less in frequency She received COVID vaccine x 2 doses  5/11/21 pt is here for follow up. Feels the same with fatigue, diarrhea. No SOB  6/21/21 Pt is here for follow up. Feels well. No SOB, CP, N< V. Diarrhea is better controlled now. Last PRBC transfusion was 2 weeks ago.  7/19/21 Pt is here for follow up. Feels a little tired, last transfusion was 6/1/21. No bleeding, fever, SOB  8/10/2021 Patient is here to follow up for her MDS. She is on Luspatercept, tolerating well. She feels well today, the appetite is good. She denies shortness of breath, fatigue, fever, night sweats, abdominal pain, arthralgias/myalgias.  8/31/21 Pt is here for follow up. C/O feeling very fatigued. No bleeding. No fever.  9/21/2021 Patient is here to follow up for MDS. She is on Luspatercept and Retacrit, tolerating well. She gets blood transfusion as needed for Hgb <7 gm/dL She is c/o of fatigue, no shortness of breath, dizziness, chills or lightheadedness. She denies melena or hematochezia. Her appetite is good, no nausea/vomiting.  10/28/2021 Patient is here to follow up for MDS. She is on Luspatercept and Retacrit, tolerating well. She gets blood transfusion to keep Hgb > 7 gm/dL. She is c/o of chronic fatigue, no shortness of breath, dizziness, chills or lightheadedness. She denies melena or hematochezia. Her appetite is good, no nausea/vomiting. She c/o of severe diarrhea, 10-12x/day watery stool while on Imodium in the last 2 weeks. Last colonoscopy was in 2016, benign as per patient.  11/18/21 Pt is here for follow up. C/O fatigue. No SOB, CP  12/9/21 Pt is here for follow up. Feels better today. Recd 2 units last week in ER. Planning to go to [place] for over a week and does not want to miss her procrit dose. Diarrhea is stable,  12/23/21 Pt is here for follow up. Feels better. No SOB, CP. Going to [place] tomorrow. Has not been able to get Procrit injection from the pharmacy yet d/t insurance issues  2/3/22 Pt is here for follow up. Feeling same as usual. No SOB, CP. Last transfusion was 2 weeks ago in ER> No bleeding reported  3/3/22 Pt is here for follow up. C/O fatigue. Had black stools X2  3/30/22 Pt is feeling well. Here for treatment and BM biopsy  4/21/22 Pt is here fir follow up. Was in ER last week. Was give 2 units of PRBCs. Pt feels less tired today. Wants to discuss BM rslts  5/12/22 Pt is here today for follow up visit. Pt c/o feeling tired.  Hgb=6.9.  One unit transfusion scheduled.  6/23/22 Pt is here for follow up. Feels well today. No SOB, chest pain. Has not made appt with [facility]  7/14/22 Pt is here for follow up for lowrisk myelodysplastic syndrome. She came in late today because she was not feeling well this morning- dizziness & weakness. She denies SOB, CP.  9/22/22 Pt is here for follow up. C/O fatigue. was in the ER 3 weeks ago and recd 2 U PRBCS  11/10/22 Patient is here to follow up for MDS/MPN. S/P 2 units pRBC last weekend, feeling much better now. She denies shortness of breath, chest pain or headache.  1/5/2023 Patient is here to follow up for MDS/MPN, accompanied by spouse and daughter. She is feeling much better after she received blood transfusion on 12/29/22. She denies shortness of breath, chest pain, dizziness or headache. Pt fell on 12/28/22 when she slipped while getting down the stairs and landed on her right knee. Now c/o of severe right knee pain with limited ROM. She has not been evaluated since the incident happened. She is taking Aleeve with minimal relief.  1/19/2023 Patient is here to follow up for MDS/MPN and treatment. She feel fine, has fatigue not worse than her baseline. She denies shortness of breath, chest pain, dizziness, headache or bleeding. She was evaluated at the ER on 1/5/2023 for her fall and imaging showed no fracture, except for mild joint effusion.  2/9/23 Patient presents for follow up today. She complains of light-headedness. Her BP is elevated to 180/70 mmHg. She does not take any meds and says she is not diagnosed with HTN. She is due for Procrit & Luspatercept today. Reports her energy level is at baseline. No other complaints today. Her CMP showed serum glucose > 600 mg/dl, she was sent to ER with the transport.  3/2/32 Patient here for follow up for MDS. She is scheduled for next luspatercept injection. She is status post port placement yesterday, so she has some discomfort at chest area from that. She feels somewhat weak, appetite is adequate, weight is stable. She is under the care of endocrinologist to try to get her DM under better control. She was recommended to take ASA 81 mg when she was discharged from the hospital.  3/23/23 Patient here for follow up for MDS. She is scheduled for next Luspatercept injection. She is status post port placement She feels somewhat weak, appetite is adequate, weight is stable. She is under the care of endocrinologist to try to get her DM under better control. She was recommended to take ASA 81 mg when she was discharged from the hospital.  4/13/23 Pt is here for follow up. Daughter is with her. Neuro eval has not been completed yet. No bleeding, SOB, pain recd 2 Units PRBCs 2 weeks ago  5/4/23 Pt is here for follow up. Pt C/O not feeling well today. Denies pain. Has dizziness. Fasting bld sugar was over 350. She is not on insulin. Denies diarrhea, N, SOB, CP  5/25/23 Pt is here for follow up, accompanied by formal caregiver. She feels well except for fatigue which is unchanged from baseline. She denies chest pain, shortness of breath, bleeding or decreased urinary output. She states that she saw nephro last week and follows up with her PCP for her diabetes.  6/15/23 Pt is here for follow up. Feels weak, No SOB, CP  8/3/23 Pt is here for follow up Feels fatigued. Has. chronic diarrhea which has been attributed to her diabetes meds  8/30/23 Pt is here for follow C/O diarrhea and fatigue  11/7/2023 Patient is here for follow up. She is s/p 1 unit pRBC on 10/26/23. She feels fatigue, unchanged from her baseline. She denies shortness of breath, dizziness, lightheadedness or chest pain.  11/28/23 Patient is here for follow up. She continues to report sx of fatigue, unchanged from her baseline. She denies shortness of breath, dizziness, lightheadedness or chest pain. Patient endorses sensation of abdominal discomfort and general feeling of feeling unwell unable to further describe / elaborate   1/4/24 Patient is here to follow up for MDS/MPN, accompanied by spouse. She feels more fatigue and dizzy, denies shortness of breath, chest pain, bleeding of palpitations,. She did not report to the ER yesterday for blood transfusion as advised for Hgb 6.4 gm/dL.

## 2024-01-04 NOTE — ED PROVIDER NOTE - WHICH SHOWED
ED EKG: my independent interpretation - Dr. Merline Read, attending physician : 88  nsr, qtc 450  , no stemi, no significant heart block, no peaked  twaves

## 2024-01-04 NOTE — REVIEW OF SYSTEMS
[Fatigue] : fatigue [Negative] : Allergic/Immunologic [Dizziness] : dizziness [Recent Change In Weight] : ~T no recent weight change [Shortness Of Breath] : no shortness of breath

## 2024-01-04 NOTE — ED PROVIDER NOTE - OBJECTIVE STATEMENT
pt with pmhx MDS followed by Sheri and DM sent to ED for low h/h. was seen recently for same, had transfusion and DC. Denies fever/chill/HA/dizziness/chest pain/palpitation/sob/abd pain/n/v/d/ black stool/bloody stool/urinary sxs

## 2024-01-04 NOTE — ED PROVIDER NOTE - ATTENDING APP SHARED VISIT CONTRIBUTION OF CARE
77 yo f with pmh of MDS f/u with dr. reynolds, dm, presents with c/o symptomatic anemia.  pt says she has had transfusions in the past.  pt says she feels weak and dizzy x 1 week.  no fever or chills.  denies sob or cp.  admits dark stool.  last cscope/egd 2 yrs ago.  exam: nad, ncat, perrl, eomi, mmm, rrr, ctab, abd soft, nt, nd, aox3, neg ced,  imp: pt with symptomatic anemia, labs, 79 yo f with pmh of MDS f/u with dr. reynolds, dm, presents with c/o symptomatic anemia.  pt says she has had transfusions in the past.  pt says she feels weak and dizzy x 1 week.  no fever or chills.  denies sob or cp.  admits dark stool.  last cscope/egd 2 yrs ago.  exam: nad, ncat, perrl, eomi, mmm, rrr, ctab, abd soft, nt, nd, aox3, neg ced,  imp: pt with symptomatic anemia, labs,

## 2024-01-04 NOTE — ED PROVIDER NOTE - CARE PROVIDERS DIRECT ADDRESSES
[email or web address]

## 2024-01-04 NOTE — ED PROVIDER NOTE - PATIENT PORTAL LINK FT
You can access the FollowMyHealth Patient Portal offered by [facility] by registering at the following website: [email or web address]. By joining [employer]’s FollowMyHealth portal, you will also be able to view your health information using other applications (apps) compatible with our system. You can access the FollowMyHealth Patient Portal offered by [facility] by registering at the following website: [email or web address]. By joining [employer]’s FollowMyHealth portal, you will also be able to view your health information using other applications (apps) compatible with our system.

## 2024-01-04 NOTE — ED PROVIDER NOTE - CARE PROVIDER_API CALL
Kaveh Wade  Hematology  [address] [place] [id number]  Phone: [phone]  Fax: [phone]  Follow Up Time:    Kaveh Wade  Hematology  [address] [place] [id number]  Phone: [phone]  Fax: [phone]  Follow Up Time:

## 2024-01-04 NOTE — ED ADULT NURSE NOTE - NSFALLUNIVINTERV_ED_ALL_ED
Bed/Stretcher in lowest position, wheels locked, appropriate side rails in place/Call bell, personal items and telephone in reach/Instruct patient to call for assistance before getting out of bed/chair/stretcher/Non-slip footwear applied when patient is off stretcher/[place] to call system/Physically safe environment - no spills, clutter or unnecessary equipment/Purposeful proactive rounding/Room/bathroom lighting operational, light cord in reach Bed/Stretcher in lowest position, wheels locked, appropriate side rails in place/Call bell, personal items and telephone in reach/Instruct patient to call for assistance before getting out of bed/chair/stretcher/Non-slip footwear applied when patient is off stretcher/[place] to call system/Physically safe environment - no spills, clutter or unnecessary equipment/Purposeful proactive rounding/Room/bathroom lighting operational, light cord in reach

## 2024-01-04 NOTE — ED ADULT NURSE NOTE - HOW OFTEN DO YOU HAVE A DRINK CONTAINING ALCOHOL?
Transition of Care Discharge Follow-up Questionnaire   Date/Time of Call:   3/13/19    What was the patient hospitalized for? CVA   Does the patient understand his/her diagnosis and/or treatment and what happened during the hospitalization? States understanding   Did the patient receive discharge instructions? Yes   CM Assessed Risk for Readmission:       Patient stated Risk for Readmission:      Moderate related to diabetes management. She feels she is doing all she can to avoid readmission. Review any discharge instructions (see discharge instructions/AVS in [facility]). Ask patient if they understand these. Do they have any questions? NO driving until cleared by ophthalmology and neurology  Cardiac and diabetic diet   Were home services ordered (nursing, PT, OT, ST, etc.)? Patient refused [facility] services   If so, has the first visit occurred? If not, why? (Assist with coordination of services if necessary.)   N/A   Was any DME ordered? Percy Miners   If so, has it been received? If not, why?  (Assist patient in obtaining DME orders &/or equipment if necessary.) Yes   Complete a review of all medications (new, continued and discontinued meds per the D/C instructions and medication tab in [facility]). Start: Humalog 18u AC  Lantus 32u bid  Norvasc 5mg daily  Oxycodone IR 10mg q 6hr as needed for HA  Narcan spray as needed opioid overdose    Stop: Levemir, Novolog, , omega 3    Changed: none  Continue: all other home medications as ordered       Were all new prescriptions filled? If not, why?  (Assist patient in obtaining medications if necessary  escalate for CCM &/or SW if ongoing issues are verbalized by pt or anticipated)   Norvasc not at pharmacy, new order has been entered today. Daughter will . Does the patient understand the purpose and dosing instructions for all medications?   (If patient has questions, provide explanation and education.)   Good understanding   Does the patient have any problems in performing ADLs? (If patient is unable to perform ADLs  what is the limiting factor(s)? Do they have a support system that can assist? If no support system is present, discuss possible assistance that they may be able to obtain. Escalate for CCM/SW if ongoing issues are verbalized by pt or anticipated)   Independent prior to admission. Lives alone but family staying with her post discharge. Family to provide transportation to provider visits. Using walker in home. Does the patient have all follow-up appointments scheduled? 7 day f/up with PCP?   (f/up with PCP may be w/in 14 days if patient has a f/up with their specialist w/in 7 days)    7-14 day f/up with specialist?   (or per discharge instructions)    If f/up has not been made  what actions has the care coordinator made to accomplish this? Has transportation been arranged? PCP 3/18 at 10:15, she is unable to schedule for earlier appointment. Has referral in system for podiatry but hasnt heard from yet. Neuro NP to have office schedule appointment for f/u. Ophthalmology 3/29   Any other questions or concerns expressed by the patient? Needs DSME for change in insulin, diet compliance   Schedule next appointment with NOAH LOWRY Coordinator or refer to RN Case Manager/ per the workflow guidelines. When is care coordinators next follow-up call scheduled? If referred for CCM  what RN care manager was the referral assigned? Follow-up call within 30 days. MENDO[facility] Call Completed By: Tomas Galvan, RN       Note  Utilize questions pertinent to patient    Referral Source & Reason HRRP   Primary concerns per patient and/or pts family/caregiver Education DM, CVA, HTN   Recent Hospitalization(s)/ED Visits CVA   Current with Home Health?  - Agency? [facility]   Social Needs:  - Able to afford medications? - Financial assessment?  - Access to care? Transportation? Possible transportation need. Currently family provides transportation. Patient refused OP PT due to transportation issues, but also refused HH PT.   Nutritional Assessment  - Appetite? - Obesity?  - Failure to Thrive? - Bowels ? Needs DSME with diet   Cognitive Assessment  - History of dementia  - Health literacy    No issues noted   Mobility/Activity Assessment  - Bed/chair bound? - Make use of assistive devices? - Does patient still drive?  Decreased mobility since CVA, walks with walker   Plan/Interventions/Education  - Follow up Appointments  - Referrals Verify all referrals attended, start DSME Never

## 2024-01-04 NOTE — ED PROVIDER NOTE - PHYSICAL EXAMINATION
CONSTITUTIONAL: Well-appearing; well-nourished; in no apparent distress.   NECK: Supple; non-tender; no cervical lymphadenopathy.   CARDIOVASCULAR: Normal S1, S2; no murmurs, rubs, or gallops.   RESPIRATORY: Normal chest excursion with respiration; breath sounds clear and equal bilaterally; no wheezes, rhonchi, or rales.  GI/: Non-distended; non-tender; no palpable organomegaly.   MS: No evidence of trauma or deformity. Non-tender to palpation. Normal ROM in all four extremities; non-tender to palpation; distal pulses are normal.   SKIN: pallor; warm; dry; good turgor; no apparent lesions or exudate.   NEURO/PSYCH: A & O x 4; grossly unremarkable. mood and manner are appropriate.

## 2024-01-04 NOTE — ED PROVIDER NOTE - CLINICAL SUMMARY MEDICAL DECISION MAKING FREE TEXT BOX
Pt with symptomatic anemia, neg KAREN, h/o mds, f/u with dr. reynolds, here for transfusion.  transfused 2U without incidence and will f/u with dr. reynolds outpt.  Any ordered labs and EKG were reviewed, Dr. Merline Read, attending physician.  Any imaging was ordered and reviewed by me, Dr. Merline Read, attending physician.  Appropriate medications for patient's presenting complaints were ordered and effects were reassessed.  Patient's records, if available,  (prior hospital, ED visit, and/or nursing home notes if available) were reviewed.  Additional history was obtained from EMS, family, and/or PCP (when available).  Escalation to admission/observation was considered.  1) However patient feels much better and is comfortable with discharge.  Appropriate follow-up was arranged.

## 2024-01-04 NOTE — ASSESSMENT
[FreeTextEntry1] : Patient is a 78 year old female with Lowrisk myelodysplastic syndrome, previously treated with Revlimid and Procrit and Vidaza. 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. Patient is s/p 9 cycles of Vidaza and she was requiring pRBC transfusion every 3 weeks. Given the persistent transfusion dependence she was switched to Luspatercept.  DETECTED GENOMIC ALTERATIONS: Tier III: Variants of Unknown Clinical Significance SF3B1 [id number]  Results of BM biopsy (3/30/22) showed No e/o increased blasts. Pt is needing PRBCs almost every 3 weeks. Advised to follow up with Dr Ferrell to consider any other option or clinical trial. Called Dr Ferrell's office and discussed case with her. No clinical trial available, Rec PRBCS.  S/P port placement on 3/1/23.  PLAN: Previous notes reviewed including all relevant laboratory results and were communicated to the patient and her spouse.  -- Continue Luspatercept 1.75 mg/kg every 3 weeks, due today. CBC today reviewed; severe anemia noted with Hgb 5.8 gm/dL. Will send patient to the ER for blood transfusion. SE discussed including: hypertension, abdominal pain, diarrhea, nausea, increased serum alanine aminotransferase, increased serum aspartate aminotransferase, increased serum bilirubin, dizziness, fatigue, headache, vertigo, arthralgia, musculoskeletal pain, ostealgia, decreased creatinine clearance, cough, dyspnea.  The therapy dose was adjusted according to pt's labs and anticipated tolerance. The high risk of complications and complexity associated with this therapy administration has been explained to the patient and her daughter and they were both agreeable. Treatment will be administered under my direct supervision.  -- Hold Retacrit for now. -- Monitor CBC weekly and administer 1 pRBC unit as needed when Hgb < 7 gm/dL. Pt has e/o iron overload, so transfusions need to be kept at a minimum if possible. She is maintaining her requirement of PRBCs almost Q 3 weeks.  -- Emphasized compliance with follow up appts, including Luspatercept appt.  # Vitamin B 12 deficiency -- Continue Vitamin B 12 injection every month, due today.  # Thrombocytosis (controlled) -- previously on Hydrea. -- PLT today reviewed today 392 K/uL. -- Will continue to monitor.  #Decreased kidney function -- CMP reviewed. -- Will refer to renal.  RTC in 3 weeks.

## 2024-01-04 NOTE — PHYSICAL EXAM
[Ambulatory and capable of all self care but unable to carry out any work activities] : Status 2- Ambulatory and capable of all self care but unable to carry out any work activities. Up and about more than 50% of waking hours [Normal] : affect appropriate [de-identified] : Ambulatory. [de-identified] : Lower extremity mild edema [de-identified] : Right chest port-A-cath intact, no erythema or tenderness noted.

## 2024-01-05 DIAGNOSIS — D46.9 MYELODYSPLASTIC SYNDROME, UNSPECIFIED: ICD-10-CM

## 2024-01-10 ENCOUNTER — APPOINTMENT (OUTPATIENT)
Dept: NEPHROLOGY | Facility: CLINIC | Age: 79
End: 2024-01-10

## 2024-01-11 ENCOUNTER — APPOINTMENT (OUTPATIENT)
Dept: NEPHROLOGY | Facility: CLINIC | Age: 79
End: 2024-01-11
Payer: MEDICARE

## 2024-01-11 VITALS
HEIGHT: 62 IN | SYSTOLIC BLOOD PRESSURE: 128 MMHG | TEMPERATURE: 98.4 F | BODY MASS INDEX: 24.75 KG/M2 | WEIGHT: 134.48 LBS | DIASTOLIC BLOOD PRESSURE: 70 MMHG | HEART RATE: 77 BPM

## 2024-01-11 DIAGNOSIS — K58.0 IRRITABLE BOWEL SYNDROME WITH DIARRHEA: ICD-10-CM

## 2024-01-11 PROCEDURE — 99204 OFFICE O/P NEW MOD 45 MIN: CPT

## 2024-01-11 PROCEDURE — 81003 URINALYSIS AUTO W/O SCOPE: CPT | Mod: QW

## 2024-01-11 RX ORDER — GLIMEPIRIDE 2 MG/1
2 TABLET ORAL
Refills: 0 | Status: ACTIVE | COMMUNITY

## 2024-01-11 RX ORDER — PIOGLITAZONE HYDROCHLORIDE 30 MG/1
30 TABLET ORAL
Refills: 0 | Status: ACTIVE | COMMUNITY

## 2024-01-11 RX ORDER — LINAGLIPTIN 5 MG/1
5 TABLET, FILM COATED ORAL
Refills: 0 | Status: ACTIVE | COMMUNITY

## 2024-01-11 RX ORDER — NIFEDIPINE 30 MG/1
30 TABLET, FILM COATED, EXTENDED RELEASE ORAL
Refills: 0 | Status: ACTIVE | COMMUNITY

## 2024-01-11 NOTE — PHYSICAL EXAM
[General Appearance - Alert] : alert [General Appearance - In No Acute Distress] : in no acute distress [Sclera] : the sclera and conjunctiva were normal [PERRL With Normal Accommodation] : pupils were equal in size, round, and reactive to light [Extraocular Movements] : extraocular movements were intact [Outer Ear] : the ears and nose were normal in appearance [Oropharynx] : the oropharynx was normal [Neck Appearance] : the appearance of the neck was normal [Neck Cervical Mass (___cm)] : no neck mass was observed [Jugular Venous Distention Increased] : there was no jugular-venous distention [Thyroid Nodule] : there were no palpable thyroid nodules [Thyroid Diffuse Enlargement] : the thyroid was not enlarged [Auscultation Breath Sounds / Voice Sounds] : lungs were clear to auscultation bilaterally [Heart Rate And Rhythm] : heart rate was normal and rhythm regular [Heart Sounds] : normal S1 and S2 [Heart Sounds Gallop] : no gallops [Murmurs] : no murmurs [Full Pulse] : the pedal pulses are present [Heart Sounds Pericardial Friction Rub] : no pericardial rub [Edema] : there was no peripheral edema [Bowel Sounds] : normal bowel sounds [Abdomen Soft] : soft [Abdomen Tenderness] : non-tender [Abdomen Mass (___ Cm)] : no abdominal mass palpated [No CVA Tenderness] : no ~M costovertebral angle tenderness [No Spinal Tenderness] : no spinal tenderness [Abnormal Walk] : normal gait [Nail Clubbing] : no clubbing  or cyanosis of the fingernails [Musculoskeletal - Swelling] : no joint swelling seen [Motor Tone] : muscle strength and tone were normal [Skin Color & Pigmentation] : normal skin color and pigmentation [Skin Turgor] : normal skin turgor [] : no rash [Motor Exam] : the motor exam was normal [No Focal Deficits] : no focal deficits [Oriented To Time, Place, And Person] : oriented to person, place, and time [Impaired Insight] : insight and judgment were intact [Affect] : the affect was normal

## 2024-01-11 NOTE — HISTORY OF PRESENT ILLNESS
[FreeTextEntry1] : Sophia Kennedy is a 77 yo F with history of NIDDM, HTN, myelodysplastic syndrome (on Revlimid), anemia (on procrit injections with HemOnc, more recently requiring frequent pRBC infusions for severe anemia), prior episodes of MARCO.  She previously saw another Nephrologist (Dr. Ferrell) for abnormal kidney function, last seen in May 2023.  Most recent labwork in Dec 2023 noted a sCr 1.5-1.7 (worsening from 1.1 in August 2023), likely from chronic anemia.  No prior history of nephritis, nephrosis, brights disease, scarlet fever or rheumatic heart disease. The patient denied chronic UTI's, bladder infections, cystitis, or pyelonephritis. No known hematuria, proteinuria.   Recent Hospitalizations: none  Recent Medications changes: none  NSAIDS use: denies use  Home BP: no abnormal readings noted  Home FSBS:  no abnormal readings noted  LUTS: denies LUTS, foamy urine or hematuria  Uremic Symptoms: no pruritis, metallic taste, new onset weakness, dysphagia, poor appetite, or tremors noted  FamHx CKD/RRT:  denies  Personal History of CKD/RRT:  denies

## 2024-01-11 NOTE — ASSESSMENT
[FreeTextEntry1] : #) CKD stage G3a: - will continue to monitor progression of renal dysfunction.  The most recent SCr is 1.5 mg/dL with an eGFR of 40 ml/min - etiology of underlying CKD is most likely 2/2 chronic severe hypoxemia related to MDS, possible contribution of DM - will monitor acid base; no bicarbonate supplementation is required at current HCO3  - continue to monitor Vit D, Phos and PTH for mineral bone disease  - continue to monitor urine studies for proteinuria with U/A and urine protein quantification studies  - continue to monitor and treat hypercholesterolemia to goal LDL <100   #) Essential HTN:  BP controlled in clinic today  - advised salt restrictive diet of <2.4gm daily  - continue to monitor home BP readings and report in future clinic appointments  - continue use of Nifedipine ER 90mg daily, stopping Hydralazine 10mg TID - Starting Lisinopril 10mg daily  #) NIDDM:  A1c controlled as per patient report - Continue current use of Tradjenta 5mg daily, Pioglitazone 30mg daily, and Glimepiride 2mg daily - Will check A1c and proteinuria studies, and based on findings will consider use of SGLT-2 inhibitor

## 2024-01-12 ENCOUNTER — NON-APPOINTMENT (OUTPATIENT)
Age: 79
End: 2024-01-12

## 2024-01-12 LAB
BILIRUB UR QL STRIP: NORMAL
CLARITY UR: CLEAR
COLLECTION METHOD: NORMAL
GLUCOSE UR-MCNC: NORMAL
HCG UR QL: 0.2 EU/DL
HGB UR QL STRIP.AUTO: NORMAL
KETONES UR-MCNC: NORMAL
LEUKOCYTE ESTERASE UR QL STRIP: NORMAL
NITRITE UR QL STRIP: NORMAL
PH UR STRIP: 6
PROT UR STRIP-MCNC: 300
SP GR UR STRIP: 1.02

## 2024-01-14 NOTE — ED ADULT TRIAGE NOTE - INTERNATIONAL TRAVEL
An ISAR is not yet obtained as the patient is acutely confused and unable to answer questions accurately. His wife will be contacted to complete ISAR.   No

## 2024-01-17 RX ORDER — LISINOPRIL 10 MG/1
10 TABLET ORAL
Qty: 90 | Refills: 1 | Status: ACTIVE | COMMUNITY
Start: 2024-01-17 | End: 1900-01-01

## 2024-01-17 NOTE — ED ADULT TRIAGE NOTE - WEIGHT IN KG
74.8
FAMILY HISTORY:  Mother  Still living? No  Family history of diabetes mellitus (DM), Age at diagnosis: Age Unknown    Child  Still living? Yes, Estimated age: Age Unknown  FH: liver cancer, Age at diagnosis: Age Unknown

## 2024-01-21 NOTE — DISCHARGE NOTE PROVIDER - NSDCPNSUBOBJ_GEN_ALL_CORE
Pt was seen and examined at the bedside. resting comfortably.  Hb 9.7 after 2 units PRBC.  Seen by GI and Heme/Onc - recommend outpatient f/u.      Vital Signs Last 24 Hrs  T(C): 36.7 (15 Apr 2022 16:00), Max: 37.4 (14 Apr 2022 21:45)  T(F): 98 (15 Apr 2022 16:00), Max: 99.3 (14 Apr 2022 21:45)  HR: 84 (15 Apr 2022 16:00) (74 - 92)  BP: 152/70 (15 Apr 2022 16:00) (146/68 - 182/78)  BP(mean): 100 (15 Apr 2022 16:00) (98 - 106)  RR: 19 (15 Apr 2022 16:00) (18 - 19)  SpO2: 99% (15 Apr 2022 16:00) (99% - 100%)
yes

## 2024-01-25 ENCOUNTER — TRANSCRIPTION ENCOUNTER (OUTPATIENT)
Age: 79
End: 2024-01-25

## 2024-01-25 ENCOUNTER — LABORATORY RESULT (OUTPATIENT)
Age: 79
End: 2024-01-25

## 2024-01-25 ENCOUNTER — APPOINTMENT (OUTPATIENT)
Dept: HEMATOLOGY ONCOLOGY | Facility: CLINIC | Age: 79
End: 2024-01-25
Payer: MEDICARE

## 2024-01-25 ENCOUNTER — APPOINTMENT (OUTPATIENT)
Dept: INFUSION THERAPY | Facility: CLINIC | Age: 79
End: 2024-01-25
Payer: MEDICARE

## 2024-01-25 ENCOUNTER — OUTPATIENT (OUTPATIENT)
Dept: OUTPATIENT SERVICES | Facility: HOSPITAL | Age: 79
LOS: 1 days | End: 2024-01-25
Payer: MEDICARE

## 2024-01-25 VITALS
SYSTOLIC BLOOD PRESSURE: 161 MMHG | TEMPERATURE: 97.2 F | HEIGHT: 62 IN | WEIGHT: 145 LBS | DIASTOLIC BLOOD PRESSURE: 70 MMHG | BODY MASS INDEX: 26.68 KG/M2 | HEART RATE: 101 BPM

## 2024-01-25 VITALS — TEMPERATURE: 97 F | SYSTOLIC BLOOD PRESSURE: 161 MMHG | HEART RATE: 101 BPM | DIASTOLIC BLOOD PRESSURE: 70 MMHG

## 2024-01-25 DIAGNOSIS — D46.9 MYELODYSPLASTIC SYNDROME, UNSPECIFIED: ICD-10-CM

## 2024-01-25 DIAGNOSIS — Z98.890 OTHER SPECIFIED POSTPROCEDURAL STATES: Chronic | ICD-10-CM

## 2024-01-25 LAB
HCT VFR BLD CALC: 23.2 %
HGB BLD-MCNC: 7.5 G/DL
MCHC RBC-ENTMCNC: 28.6 PG
MCHC RBC-ENTMCNC: 32.3 G/DL
MCV RBC AUTO: 88.5 FL
PLATELET # BLD AUTO: 406 K/UL
PMV BLD: 9.8 FL
RBC # BLD: 2.62 M/UL
RBC # FLD: 18 %
WBC # FLD AUTO: 9.83 K/UL

## 2024-01-25 PROCEDURE — 96401 CHEMO ANTI-NEOPL SQ/IM: CPT

## 2024-01-25 PROCEDURE — G2211 COMPLEX E/M VISIT ADD ON: CPT

## 2024-01-25 PROCEDURE — 99215 OFFICE O/P EST HI 40 MIN: CPT

## 2024-01-25 PROCEDURE — 85027 COMPLETE CBC AUTOMATED: CPT

## 2024-01-25 RX ORDER — LUSPATERCEPT 75 MG/1
110 INJECTION, POWDER, LYOPHILIZED, FOR SOLUTION SUBCUTANEOUS ONCE
Refills: 0 | Status: COMPLETED | OUTPATIENT
Start: 2024-01-25 | End: 2024-01-25

## 2024-01-25 RX ADMIN — LUSPATERCEPT 110 MILLIGRAM(S): 75 INJECTION, POWDER, LYOPHILIZED, FOR SOLUTION SUBCUTANEOUS at 12:42

## 2024-01-31 ENCOUNTER — OUTPATIENT (OUTPATIENT)
Dept: OUTPATIENT SERVICES | Facility: HOSPITAL | Age: 79
LOS: 1 days | End: 2024-01-31

## 2024-01-31 DIAGNOSIS — N18.32 CHRONIC KIDNEY DISEASE, STAGE 3B: ICD-10-CM

## 2024-01-31 DIAGNOSIS — Z98.890 OTHER SPECIFIED POSTPROCEDURAL STATES: Chronic | ICD-10-CM

## 2024-02-01 ENCOUNTER — EMERGENCY (EMERGENCY)
Facility: HOSPITAL | Age: 79
LOS: 0 days | Discharge: HOME | End: 2024-02-02
Attending: EMERGENCY MEDICINE
Payer: MEDICARE

## 2024-02-01 VITALS
TEMPERATURE: 98 F | HEART RATE: 87 BPM | OXYGEN SATURATION: 96 % | RESPIRATION RATE: 18 BRPM | DIASTOLIC BLOOD PRESSURE: 74 MMHG | SYSTOLIC BLOOD PRESSURE: 179 MMHG

## 2024-02-01 VITALS
WEIGHT: 138.89 LBS | RESPIRATION RATE: 18 BRPM | HEIGHT: 62 IN | DIASTOLIC BLOOD PRESSURE: 61 MMHG | SYSTOLIC BLOOD PRESSURE: 134 MMHG | HEART RATE: 96 BPM | TEMPERATURE: 99 F | OXYGEN SATURATION: 99 %

## 2024-02-01 DIAGNOSIS — D63.1 ANEMIA IN CHRONIC KIDNEY DISEASE: ICD-10-CM

## 2024-02-01 DIAGNOSIS — I12.9 HYPERTENSIVE CHRONIC KIDNEY DISEASE WITH STAGE 1 THROUGH STAGE 4 CHRONIC KIDNEY DISEASE, OR UNSPECIFIED CHRONIC KIDNEY DISEASE: ICD-10-CM

## 2024-02-01 DIAGNOSIS — N18.32 CHRONIC KIDNEY DISEASE, STAGE 3B: ICD-10-CM

## 2024-02-01 DIAGNOSIS — T50.996A UNDERDOSING OF OTHER DRUGS, MEDICAMENTS AND BIOLOGICAL SUBSTANCES, INITIAL ENCOUNTER: ICD-10-CM

## 2024-02-01 DIAGNOSIS — Z91.148 PATIENT'S OTHER NONCOMPLIANCE WITH MEDICATION REGIMEN FOR OTHER REASON: ICD-10-CM

## 2024-02-01 DIAGNOSIS — E87.5 HYPERKALEMIA: ICD-10-CM

## 2024-02-01 DIAGNOSIS — E11.22 TYPE 2 DIABETES MELLITUS WITH DIABETIC CHRONIC KIDNEY DISEASE: ICD-10-CM

## 2024-02-01 DIAGNOSIS — D46.9 MYELODYSPLASTIC SYNDROME, UNSPECIFIED: ICD-10-CM

## 2024-02-01 DIAGNOSIS — R79.9 ABNORMAL FINDING OF BLOOD CHEMISTRY, UNSPECIFIED: ICD-10-CM

## 2024-02-01 DIAGNOSIS — Z98.890 OTHER SPECIFIED POSTPROCEDURAL STATES: Chronic | ICD-10-CM

## 2024-02-01 DIAGNOSIS — N18.9 CHRONIC KIDNEY DISEASE, UNSPECIFIED: ICD-10-CM

## 2024-02-01 LAB
25(OH)D3 SERPL-MCNC: 18 NG/ML
ALBUMIN SERPL ELPH-MCNC: 4.2 G/DL — SIGNIFICANT CHANGE UP (ref 3.5–5.2)
ALP SERPL-CCNC: 111 U/L — SIGNIFICANT CHANGE UP (ref 30–115)
ALT FLD-CCNC: 12 U/L — SIGNIFICANT CHANGE UP (ref 0–41)
ANION GAP SERPL CALC-SCNC: 11 MMOL/L — SIGNIFICANT CHANGE UP (ref 7–14)
ANION GAP SERPL CALC-SCNC: 17 MMOL/L
ANISOCYTOSIS BLD QL: SIGNIFICANT CHANGE UP
AST SERPL-CCNC: 14 U/L — SIGNIFICANT CHANGE UP (ref 0–41)
BASOPHILS # BLD AUTO: 0 K/UL — SIGNIFICANT CHANGE UP (ref 0–0.2)
BASOPHILS NFR BLD AUTO: 0 % — SIGNIFICANT CHANGE UP (ref 0–1)
BILIRUB SERPL-MCNC: 0.6 MG/DL — SIGNIFICANT CHANGE UP (ref 0.2–1.2)
BLD GP AB SCN SERPL QL: SIGNIFICANT CHANGE UP
BUN SERPL-MCNC: 37 MG/DL
BUN SERPL-MCNC: 45 MG/DL — HIGH (ref 10–20)
CALCIUM SERPL-MCNC: 9.3 MG/DL — SIGNIFICANT CHANGE UP (ref 8.4–10.5)
CALCIUM SERPL-MCNC: 9.6 MG/DL
CHLORIDE SERPL-SCNC: 100 MMOL/L — SIGNIFICANT CHANGE UP (ref 98–110)
CHLORIDE SERPL-SCNC: 98 MMOL/L
CHOLEST SERPL-MCNC: 115 MG/DL
CO2 SERPL-SCNC: 20 MMOL/L
CO2 SERPL-SCNC: 25 MMOL/L — SIGNIFICANT CHANGE UP (ref 17–32)
CREAT SERPL-MCNC: 1.4 MG/DL — SIGNIFICANT CHANGE UP (ref 0.7–1.5)
CREAT SERPL-MCNC: 1.5 MG/DL
CYSTATIN C SERPL-MCNC: 2.24 MG/L
EGFR: 36 ML/MIN/1.73M2
EGFR: 39 ML/MIN/1.73M2 — LOW
EOSINOPHIL # BLD AUTO: 0.22 K/UL — SIGNIFICANT CHANGE UP (ref 0–0.7)
EOSINOPHIL NFR BLD AUTO: 2.8 % — SIGNIFICANT CHANGE UP (ref 0–8)
GFR/BSA.PRED SERPLBLD CYS-BASED-ARV: 23 ML/MIN/1.73M2
GIANT PLATELETS BLD QL SMEAR: PRESENT — SIGNIFICANT CHANGE UP
GLUCOSE BLDC GLUCOMTR-MCNC: 186 MG/DL — HIGH (ref 70–99)
GLUCOSE SERPL-MCNC: 234 MG/DL
GLUCOSE SERPL-MCNC: 288 MG/DL — HIGH (ref 70–99)
HCT VFR BLD CALC: 17.9 % — LOW (ref 37–47)
HCT VFR BLD CALC: 22.8 %
HDLC SERPL-MCNC: 54 MG/DL
HGB BLD-MCNC: 5.8 G/DL — CRITICAL LOW (ref 12–16)
HGB BLD-MCNC: 7 G/DL
HYPOSEGMENTATION: PRESENT — SIGNIFICANT CHANGE UP
LDLC SERPL CALC-MCNC: 31 MG/DL
LYMPHOCYTES # BLD AUTO: 1.01 K/UL — LOW (ref 1.2–3.4)
LYMPHOCYTES # BLD AUTO: 12.7 % — LOW (ref 20.5–51.1)
MANUAL SMEAR VERIFICATION: SIGNIFICANT CHANGE UP
MCHC RBC-ENTMCNC: 28 PG
MCHC RBC-ENTMCNC: 28.3 PG — SIGNIFICANT CHANGE UP (ref 27–31)
MCHC RBC-ENTMCNC: 30.7 G/DL
MCHC RBC-ENTMCNC: 32.4 G/DL — SIGNIFICANT CHANGE UP (ref 32–37)
MCV RBC AUTO: 87.3 FL — SIGNIFICANT CHANGE UP (ref 81–99)
MCV RBC AUTO: 91.2 FL
MICROCYTES BLD QL: SIGNIFICANT CHANGE UP
MONOCYTES # BLD AUTO: 0.44 K/UL — SIGNIFICANT CHANGE UP (ref 0.1–0.6)
MONOCYTES NFR BLD AUTO: 5.6 % — SIGNIFICANT CHANGE UP (ref 1.7–9.3)
NEUTROPHILS # BLD AUTO: 6.26 K/UL — SIGNIFICANT CHANGE UP (ref 1.4–6.5)
NEUTROPHILS NFR BLD AUTO: 78.9 % — HIGH (ref 42.2–75.2)
NONHDLC SERPL-MCNC: 61 MG/DL
NRBC # BLD: 1 /100 WBCS — HIGH (ref 0–0)
NRBC # BLD: SIGNIFICANT CHANGE UP /100 WBCS (ref 0–0)
OVALOCYTES BLD QL SMEAR: SLIGHT — SIGNIFICANT CHANGE UP
PHOSPHATE SERPL-MCNC: 3.7 MG/DL
PLAT MORPH BLD: NORMAL — SIGNIFICANT CHANGE UP
PLATELET # BLD AUTO: 464 K/UL — HIGH (ref 130–400)
PLATELET # BLD AUTO: 630 K/UL
PMV BLD AUTO: 0 /100 WBCS
PMV BLD: 10.3 FL
PMV BLD: 10.4 FL — SIGNIFICANT CHANGE UP (ref 7.4–10.4)
POIKILOCYTOSIS BLD QL AUTO: SLIGHT — SIGNIFICANT CHANGE UP
POLYCHROMASIA BLD QL SMEAR: SLIGHT — SIGNIFICANT CHANGE UP
POTASSIUM SERPL-MCNC: 6.5 MMOL/L — CRITICAL HIGH (ref 3.5–5)
POTASSIUM SERPL-SCNC: 6.5 MMOL/L — CRITICAL HIGH (ref 3.5–5)
POTASSIUM SERPL-SCNC: 6.9 MMOL/L
PROT SERPL-MCNC: 6 G/DL — SIGNIFICANT CHANGE UP (ref 6–8)
RBC # BLD: 2.05 M/UL — LOW (ref 4.2–5.4)
RBC # BLD: 2.5 M/UL
RBC # FLD: 18.9 % — HIGH (ref 11.5–14.5)
RBC # FLD: 19.2 %
RBC BLD AUTO: ABNORMAL
SODIUM SERPL-SCNC: 135 MMOL/L
SODIUM SERPL-SCNC: 136 MMOL/L — SIGNIFICANT CHANGE UP (ref 135–146)
TRIGL SERPL-MCNC: 149 MG/DL
WBC # BLD: 7.93 K/UL — SIGNIFICANT CHANGE UP (ref 4.8–10.8)
WBC # FLD AUTO: 12.68 K/UL
WBC # FLD AUTO: 7.93 K/UL — SIGNIFICANT CHANGE UP (ref 4.8–10.8)

## 2024-02-01 PROCEDURE — 82962 GLUCOSE BLOOD TEST: CPT

## 2024-02-01 PROCEDURE — 93005 ELECTROCARDIOGRAM TRACING: CPT

## 2024-02-01 PROCEDURE — 96375 TX/PRO/DX INJ NEW DRUG ADDON: CPT

## 2024-02-01 PROCEDURE — 36415 COLL VENOUS BLD VENIPUNCTURE: CPT

## 2024-02-01 PROCEDURE — 99285 EMERGENCY DEPT VISIT HI MDM: CPT | Mod: 25

## 2024-02-01 PROCEDURE — G0378: CPT

## 2024-02-01 PROCEDURE — 93010 ELECTROCARDIOGRAM REPORT: CPT

## 2024-02-01 PROCEDURE — 80053 COMPREHEN METABOLIC PANEL: CPT

## 2024-02-01 PROCEDURE — 86923 COMPATIBILITY TEST ELECTRIC: CPT

## 2024-02-01 PROCEDURE — 86900 BLOOD TYPING SEROLOGIC ABO: CPT

## 2024-02-01 PROCEDURE — 85025 COMPLETE CBC W/AUTO DIFF WBC: CPT

## 2024-02-01 PROCEDURE — 96374 THER/PROPH/DIAG INJ IV PUSH: CPT

## 2024-02-01 PROCEDURE — 36430 TRANSFUSION BLD/BLD COMPNT: CPT

## 2024-02-01 PROCEDURE — 86850 RBC ANTIBODY SCREEN: CPT

## 2024-02-01 PROCEDURE — 86901 BLOOD TYPING SEROLOGIC RH(D): CPT

## 2024-02-01 PROCEDURE — P9040: CPT

## 2024-02-01 PROCEDURE — 84132 ASSAY OF SERUM POTASSIUM: CPT

## 2024-02-01 PROCEDURE — 99223 1ST HOSP IP/OBS HIGH 75: CPT

## 2024-02-01 RX ORDER — HYDRALAZINE HCL 50 MG
10 TABLET ORAL ONCE
Refills: 0 | Status: DISCONTINUED | OUTPATIENT
Start: 2024-02-01 | End: 2024-02-01

## 2024-02-01 RX ORDER — SODIUM ZIRCONIUM CYCLOSILICATE 10 G/10G
10 POWDER, FOR SUSPENSION ORAL
Qty: 140 | Refills: 0
Start: 2024-02-01 | End: 2024-02-14

## 2024-02-01 RX ORDER — CALCIUM GLUCONATE 100 MG/ML
2 VIAL (ML) INTRAVENOUS ONCE
Refills: 0 | Status: COMPLETED | OUTPATIENT
Start: 2024-02-01 | End: 2024-02-01

## 2024-02-01 RX ORDER — SODIUM ZIRCONIUM CYCLOSILICATE 10 G/10G
10 POWDER, FOR SUSPENSION ORAL ONCE
Refills: 0 | Status: COMPLETED | OUTPATIENT
Start: 2024-02-01 | End: 2024-02-01

## 2024-02-01 RX ORDER — HYDRALAZINE HCL 50 MG
25 TABLET ORAL ONCE
Refills: 0 | Status: COMPLETED | OUTPATIENT
Start: 2024-02-01 | End: 2024-02-01

## 2024-02-01 RX ORDER — DEXTROSE 50 % IN WATER 50 %
25 SYRINGE (ML) INTRAVENOUS ONCE
Refills: 0 | Status: COMPLETED | OUTPATIENT
Start: 2024-02-01 | End: 2024-02-01

## 2024-02-01 RX ORDER — INSULIN HUMAN 100 [IU]/ML
10 INJECTION, SOLUTION SUBCUTANEOUS ONCE
Refills: 0 | Status: COMPLETED | OUTPATIENT
Start: 2024-02-01 | End: 2024-02-01

## 2024-02-01 RX ADMIN — Medication 200 GRAM(S): at 13:42

## 2024-02-01 RX ADMIN — SODIUM ZIRCONIUM CYCLOSILICATE 10 GRAM(S): 10 POWDER, FOR SUSPENSION ORAL at 13:51

## 2024-02-01 RX ADMIN — Medication 25 MILLILITER(S): at 14:13

## 2024-02-01 RX ADMIN — Medication 25 MILLIGRAM(S): at 20:09

## 2024-02-01 RX ADMIN — INSULIN HUMAN 10 UNIT(S): 100 INJECTION, SOLUTION SUBCUTANEOUS at 13:31

## 2024-02-01 NOTE — ED CDU PROVIDER INITIAL DAY NOTE - PROGRESS NOTE DETAILS
potassium noted to be 6.5.  Lokelma calcium insulin & dextrose given in the ED. Plan to repeat K following transfusion and prior to discharge. Patient states that she has been unable to obtain Lokelma at her pharmacy. [facility] with [facility] who has in [facility]. Patient's aide to  prior to patient discharge. Patient received 1st unit PRBC - feeling well; VSS. pt received signout from Chuckie Hansen - pt in obs for transfuion of blood; pt with hx mds being followed by Dr. Wade; k+ elevated(pt not taking lokelma not avail at her pharmacy); pt treated for hyperkalemia, currently getting 2nd unit; will repeat k+ post tx; if improved d/c home;

## 2024-02-01 NOTE — ED PROVIDER NOTE - OBJECTIVE STATEMENT
79yo female PMHx MDS (follows with Dr Wade), DM, HTN, CKD, presenting on the request of Dr Wade for hemoglobin of 6.6 for blood trx. Pt denies any symptoms; no cp/sob, no ap/v/d, no bloody stool, no dizziness/lightheadedness/syncope. Last trx was 1/4, no complications.

## 2024-02-01 NOTE — ED ADULT NURSE NOTE - NSFALLHARMRISKINTERV_ED_ALL_ED
Communicate risk of Fall with Harm to all staff, patient, and family/Provide visual cue: red socks, yellow wristband, yellow gown, etc/Reinforce activity limits and safety measures with patient and family/Use of alarms - bed, stretcher, chair and/or video monitoring/Bed in lowest position, wheels locked, appropriate side rails in place/Call bell, personal items and telephone in reach/Instruct patient to call for assistance before getting out of bed/chair/stretcher/Non-slip footwear applied when patient is off stretcher/[place] to call system/Physically safe environment - no spills, clutter or unnecessary equipment/Purposeful Proactive Rounding/Room/bathroom lighting operational, light cord in reach

## 2024-02-01 NOTE — ED PROVIDER NOTE - CLINICAL SUMMARY MEDICAL DECISION MAKING FREE TEXT BOX
Patient here with anemia due to MDS.  Here hemoglobin is 5.8 plan for transfusion and placement in obs.  However, the patient has potassium of 6.5.  Will give Lokelma calcium insulin dextrose. place in obs for transfusion, repeat potassium

## 2024-02-01 NOTE — ED CDU PROVIDER INITIAL DAY NOTE - OBJECTIVE STATEMENT
77 y/o F, PMHx MDS (follows with Dr. Wade), HTN, CKD, presents to the ED with complaints of anemia. Her Hgb at Dr. Wade's office was 6.6 and she was advised to come to the ED for 2 units of PRBC's. Patient states that she has been receiving blood transfusions approximately every three weeks. She states that she has not been taking her Lokelma for the past three weeks as her pharmacy did not have in stock. She denies any chest pain, dyspnea, nausea, vomiting, fever, chills, abdominal pain, back pain and headache.

## 2024-02-01 NOTE — ED ADULT NURSE REASSESSMENT NOTE - NS ED NURSE REASSESS COMMENT FT1
Pt reassessed  report felling weak , comfort provide  Lokelma 10 mg  po order ,Regular insulin 10 unit IVP ,.dextrose 1 amp is given Calcium gluconate 2 m IVPB is given  forK= 6.5and tolerated well 2 initt PRBC order to be given ready to be start , comfort provide on the bed safety precaution on peones ,on going nursing observation .

## 2024-02-01 NOTE — ED CDU PROVIDER INITIAL DAY NOTE - ATTENDING APP SHARED VISIT CONTRIBUTION OF CARE
78-year-old female history of MDS followed with dr. Wade, hypertension and CKD presents for ration of low hemoglobin.  Patient denies any symptoms.  Of note, she states she has not been taking Lokelma as prescribed.  Here patient well-appearing no distress exam as above  Impression  Patient here with anemia due to MDS.  Here hemoglobin is 5.8 plan for transfusion and placement in obs.  However, the patient has potassium of 6.5.  Will give Lokelma calcium insulin dextrose. place in obs for transfusion, repeat potassium

## 2024-02-01 NOTE — ED CDU PROVIDER INITIAL DAY NOTE - CLINICAL SUMMARY MEDICAL DECISION MAKING FREE TEXT BOX
plan for transfusion, repeat potassium. etiology of high K can be due to med noncompliance as pt has not been taking lokelma

## 2024-02-01 NOTE — ED PROVIDER NOTE - ATTENDING CONTRIBUTION TO CARE
78-year-old female history of MDS followed with dr. Wade, hypertension and CKD presents for ration of low hemoglobin.  Patient denies any symptoms.  Of note, she states she has not been taking Lokelma as prescribed.  Here patient well-appearing no distress exam as above  Impression  Patient here with anemia due to MDS.  Here hemoglobin is 5.8 plan for transfusion and placement in obs.  However, the patient has potassium of 6.5.  Will give Lokelma calcium insulin dextrose.  Anticipate placing the patient in obs.

## 2024-02-02 ENCOUNTER — NON-APPOINTMENT (OUTPATIENT)
Age: 79
End: 2024-02-02

## 2024-02-02 LAB
POTASSIUM SERPL-MCNC: 5.7 MMOL/L — HIGH (ref 3.5–5)
POTASSIUM SERPL-SCNC: 5.7 MMOL/L — HIGH (ref 3.5–5)

## 2024-02-02 PROCEDURE — 99239 HOSP IP/OBS DSCHRG MGMT >30: CPT

## 2024-02-02 RX ORDER — SODIUM ZIRCONIUM CYCLOSILICATE 10 G/10G
10 POWDER, FOR SUSPENSION ORAL ONCE
Refills: 0 | Status: COMPLETED | OUTPATIENT
Start: 2024-02-02 | End: 2024-02-02

## 2024-02-02 RX ADMIN — SODIUM ZIRCONIUM CYCLOSILICATE 10 GRAM(S): 10 POWDER, FOR SUSPENSION ORAL at 01:43

## 2024-02-02 NOTE — ED CDU PROVIDER DISPOSITION NOTE - CARE PROVIDER_API CALL
Kaveh Wade  Hematology  [address] [place] [id number]  Phone: [phone]  Fax: [phone]  Established Patient  Follow Up Time: 1-3 Days

## 2024-02-02 NOTE — ED CDU PROVIDER DISPOSITION NOTE - PATIENT PORTAL LINK FT
You can access the FollowMyHealth Patient Portal offered by [facility] by registering at the following website: [email or web address]. By joining [employer]’s FollowMyHealth portal, you will also be able to view your health information using other applications (apps) compatible with our system.

## 2024-02-02 NOTE — ED CDU PROVIDER SUBSEQUENT DAY NOTE - CLINICAL SUMMARY MEDICAL DECISION MAKING FREE TEXT BOX
78-year-old female history of MDS, diabetes, hypertension, CKD sent for low hemoglobin and to receive blood transfusion.  She is otherwise asymptomatic.  Has had prior transfusion most recently on January 4.  In the ED that hemoglobin level was confirmed to be 5.8 and patient was placed in obs. Also was noted to be hyperkalemic to 6.5.    Patient received 2 units PRBC, Lokelma, calcium, insulin, dextrose.  Repeat potassium was 5.7 which is around her baseline.  There are no EKG changes consistent with hyperkalemia.  Patient was given additional dose of Lokelma prior to discharge.  She has been noncompliant with her Lokelma in the past and states that she will  her prescription today and be compliant with it.  She does not want to stay for further treatment of hyperkalemia, continues to be asymptomatic and will go home.

## 2024-02-02 NOTE — ED CDU PROVIDER SUBSEQUENT DAY NOTE - HISTORY
pt feeling better - repeat K+ 5.7; pt trends high; will another dose of lokelma and pt to resume treatment at home; rx sent to [facility] - aide to pick meds in am

## 2024-02-02 NOTE — ED CDU PROVIDER DISPOSITION NOTE - NSFOLLOWUPINSTRUCTIONS_ED_ALL_ED_FT
Hyperkalemia  Hyperkalemia is when you have too much potassium in your blood. Potassium is normally removed (excreted) from your body by your kidneys. If there is too much potassium in your blood, it can affect your heart’s ability to function.    What are the causes?  Hyperkalemia may be caused by:    Taking in too much potassium. You can do this by:    Using salt substitutes. They contain large amounts of potassium.  Taking potassium supplements.  Eating foods high in potassium.    Excreting too little potassium. This can happen if:    Your kidneys are not working properly. Kidney (renal) disease, including short- or long-term renal failure, is a very common cause of hyperkalemia.  You are taking medicines that lower your excretion of potassium.  You have Humboldt disease.  You have a urinary tract blockage, such as kidney stones.  You are on treatment to mechanically clean your blood (dialysis) and you skip a treatment.    Releasing a high amount of potassium from your cells into your blood. This can happen with:    Injury to muscles (rhabdomyolysis) or other tissues. Most potassium is stored in your muscles.  Severe burns or infections.  Acidic blood plasma (acidosis). Acidosis can result from many diseases, such as uncontrolled diabetes.      What increases the risk?  The most common risk factor of hyperkalemia is kidney disease. Other risk factors of hyperkalemia include:    Brain disease. This is a condition where your glands do not produce enough hormones.  Alcoholism or heavy drug use.  Using certain blood pressure medicines, such as angiotensin-converting enzyme (ACE) inhibitors, angiotensin II receptor blockers (ARBs), or potassium-sparing diuretics such as spironolactone.  Severe injury or burn.    What are the signs or symptoms?  Oftentimes, there are no signs or symptoms of hyperkalemia. However, when your potassium level becomes high enough, you may experience symptoms such as:    Irregular or very slow heartbeat.  Nausea.  Fatigue.  Tingling of the skin or numbness of the hands or feet.  Muscle weakness.  Fatigue.  Not being able to move (paralysis).    You may not have any symptoms of hyperkalemia.    How is this diagnosed?  Hyperkalemia may be diagnosed by:    Physical exam.  Blood tests.  ECG (electrocardiogram).  Discussion of prescription and non-prescription drug use.    How is this treated?  Treatment for hyperkalemia is often directed at the underlying cause. In some instances, treatment may include:    Insulin.  Glucose (sugar) and water solution given through a vein (intravenous or IV ).  Dialysis.  Medicines to remove the potassium from your body.  Medicines to move calcium from your bloodstream into your tissues.    Follow these instructions at home:  Take medicines only as directed by your health care provider.  Do not take any supplements, natural products, herbs, or vitamins without reviewing them with your health care provider. Certain supplements and natural food products can have high amounts of potassium.  Limit your alcohol intake as directed by your health care provider.  Stop illegal drug use. If you need help quitting, ask your health care provider.  Keep all follow-up visits as directed by your health care provider. This is important.  If you have kidney disease, you may need to follow a low potassium diet. A dietitian can help educate you on low potassium foods.  Contact a health care provider if:  You notice an irregular or very slow heartbeat.  You feel light-headed.  You feel weak.  You are nauseous.  You have tingling or numbness in your hands or feet.  Get help right away if:  You have shortness of breath.  You have chest pain or discomfort.  You pass out.  You have muscle paralysis.  This information is not intended to replace advice given to you by your health care provider. Make sure you discuss any questions you have with your health care provider.            Anemia    Anemia is a condition in which the concentration of red blood cells or hemoglobin in the blood is below normal. Hemoglobin is a substance in red blood cells that carries oxygen to the tissues of the body. Anemia results in not enough oxygen reaching these tissues which can cause symptoms such as weakness, dizziness/lightheadedness, shortness of breath, chest pain, paleness, or nausea.    SEEK IMMEDIATE MEDICAL CARE IF YOU HAVE THE FOLLOWING SYMPTOMS: extreme weakness/chest pain/shortness of breath, black or bloody stools, vomiting blood, fainting, fever, or any signs of dehydration.    Blood Transfusion    WHAT YOU NEED TO KNOW:    A blood transfusion is used to give you blood through an IV. You may get only part of the blood, such as red blood cells, platelets, or plasma. The blood may be from you and stored for you to use later. The blood may instead be from another person. Donated blood is tested for HIV, hepatitis, syphilis, West Nile virus, and other diseases.    DISCHARGE INSTRUCTIONS:    Call 911 for any of the following:     You have a skin rash, hives, swelling, or itching.       You have trouble breathing, shortness of breath, wheezing, or coughing.      Your throat tightens or your lips or tongue swell.      You have difficulty swallowing or speaking.    Seek care immediately if:     You develop a high fever and chills.       You are dizzy, lightheaded, confused, or feel like you are going to faint.      You have nausea, diarrhea, or abdominal cramps, or you are vomiting.      You urinate little or not at all.      You develop headaches or double vision.      Your skin or the whites of your eyes look yellow.      You see pinpoint purple spots or purple patches on your body.       You have a seizure.     Contact your healthcare provider if:     You feel tired and weak within 10 days of your transfusion.      You have questions or concerns about blood transfusions.    Medicines:     Antihistamines may help stop mild itching or a rash.      Epinephrine is emergency medicine used to stop anaphylaxis. You may be given epinephrine if you are at risk for anaphylaxis. Your healthcare provider will teach you how to use it.      Take your medicine as directed. Contact your healthcare provider if you think your medicine is not helping or if you have side effects. Tell him or her if you are allergic to any medicine. Keep a list of the medicines, vitamins, and herbs you take. Include the amounts, and when and why you take them. Bring the list or the pill bottles to follow-up visits. Carry your medicine list with you in case of an emergency.    Apply ice to decrease pain and swelling. Use an ice pack, or put ice in a plastic bag and wrap a towel around it. Apply the ice pack or wrapped bag to your transfusion site for 20 minutes each hour or as directed.     Follow up with your healthcare provider as directed: Write down your questions so you remember to ask them during your visits.       © Copyright [employer] 2019 All illustrations and images included in CareNotes are the copyrighted property of [employer] or [employer].

## 2024-02-12 ENCOUNTER — APPOINTMENT (OUTPATIENT)
Dept: NEPHROLOGY | Facility: CLINIC | Age: 79
End: 2024-02-12
Payer: MEDICARE

## 2024-02-12 VITALS
BODY MASS INDEX: 25.58 KG/M2 | WEIGHT: 139 LBS | HEART RATE: 94 BPM | DIASTOLIC BLOOD PRESSURE: 66 MMHG | HEIGHT: 62 IN | OXYGEN SATURATION: 94 % | SYSTOLIC BLOOD PRESSURE: 150 MMHG | TEMPERATURE: 98.8 F

## 2024-02-12 DIAGNOSIS — N18.32 CHRONIC KIDNEY DISEASE, STAGE 3B: ICD-10-CM

## 2024-02-12 LAB
BILIRUB UR QL STRIP: NORMAL
CLARITY UR: CLEAR
COLLECTION METHOD: NORMAL
GLUCOSE UR-MCNC: NORMAL
HCG UR QL: 0.2 EU/DL
HGB UR QL STRIP.AUTO: NORMAL
KETONES UR-MCNC: NORMAL
LEUKOCYTE ESTERASE UR QL STRIP: NORMAL
NITRITE UR QL STRIP: NORMAL
PH UR STRIP: 6
PROT UR STRIP-MCNC: 300
SP GR UR STRIP: 1.01

## 2024-02-12 PROCEDURE — 99215 OFFICE O/P EST HI 40 MIN: CPT

## 2024-02-12 PROCEDURE — 81003 URINALYSIS AUTO W/O SCOPE: CPT | Mod: QW

## 2024-02-12 NOTE — HISTORY OF PRESENT ILLNESS
[FreeTextEntry1] : Sophia Kennedy is a 79 yo F with history of NIDDM, HTN, myelodysplastic syndrome (on Revlimid), anemia (on procrit injections with HemOnc, more recently requiring frequent pRBC infusions for severe anemia), prior episodes of MARCO, seen in follow-up today for CKD.    Patient was recently hospitalized due to severe anemia and hyperkalemia on recent labwork.  Lisinopril was stopped and she was put on Hydralazine and Nifedipine XL dose was increased to treat her hypertension.  Currently she feels well and is without complaint.  NSAIDS use: denies use  Home BP: no abnormal readings noted  Home FSBS:  no abnormal readings noted  LUTS: denies LUTS, foamy urine or hematuria  Uremic Symptoms: no pruritis, metallic taste, new onset weakness, dysphagia, poor appetite, or tremors noted  FamHx CKD/RRT:  denies  Personal History of CKD/RRT:  denies

## 2024-02-12 NOTE — ASSESSMENT
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 - may continue Lokelma 5gm daily for now - repeating labwork this week to decide on future use  #) Essential HTN: BP uncontrolled in clinic today - advised salt restrictive diet of <2.4gm daily - continue to monitor home BP readings and report in future clinic appointments - continue use of Nifedipine ER 90mg daily and Hydralazine 10mg TID  #) Proteinuria - will quantify proteinuria prior to next appointment - Lisinopril likely caused her hyperkalemia which necessitated hospitalization, but if her proteinuria is significant we will likely need an alternative RAAS blocker on board and to continue on Lokelma  #) NIDDM: A1c uncontrolled on last check, 8.5% was noted - Continue current use of Tradjenta 5mg daily, Pioglitazone 30mg daily, and Glimepiride 2mg daily - Starting Jardiance 10mg daily

## 2024-02-15 ENCOUNTER — APPOINTMENT (OUTPATIENT)
Dept: HEMATOLOGY ONCOLOGY | Facility: CLINIC | Age: 79
End: 2024-02-15
Payer: MEDICARE

## 2024-02-15 ENCOUNTER — OUTPATIENT (OUTPATIENT)
Dept: OUTPATIENT SERVICES | Facility: HOSPITAL | Age: 79
LOS: 1 days | End: 2024-02-15
Payer: MEDICARE

## 2024-02-15 ENCOUNTER — APPOINTMENT (OUTPATIENT)
Dept: INFUSION THERAPY | Facility: CLINIC | Age: 79
End: 2024-02-15
Payer: MEDICARE

## 2024-02-15 VITALS — HEART RATE: 73 BPM | DIASTOLIC BLOOD PRESSURE: 74 MMHG | SYSTOLIC BLOOD PRESSURE: 168 MMHG | TEMPERATURE: 98 F

## 2024-02-15 VITALS — DIASTOLIC BLOOD PRESSURE: 84 MMHG | SYSTOLIC BLOOD PRESSURE: 200 MMHG | HEART RATE: 73 BPM | TEMPERATURE: 97.6 F

## 2024-02-15 DIAGNOSIS — N18.32 CHRONIC KIDNEY DISEASE, STAGE 3B: ICD-10-CM

## 2024-02-15 DIAGNOSIS — D46.9 MYELODYSPLASTIC SYNDROME, UNSPECIFIED: ICD-10-CM

## 2024-02-15 DIAGNOSIS — Z98.890 OTHER SPECIFIED POSTPROCEDURAL STATES: Chronic | ICD-10-CM

## 2024-02-15 PROCEDURE — 99214 OFFICE O/P EST MOD 30 MIN: CPT

## 2024-02-15 PROCEDURE — 85027 COMPLETE CBC AUTOMATED: CPT

## 2024-02-15 PROCEDURE — 96372 THER/PROPH/DIAG INJ SC/IM: CPT

## 2024-02-15 PROCEDURE — 96401 CHEMO ANTI-NEOPL SQ/IM: CPT

## 2024-02-15 RX ORDER — PREGABALIN 225 MG/1
1000 CAPSULE ORAL ONCE
Refills: 0 | Status: COMPLETED | OUTPATIENT
Start: 2024-02-15 | End: 2024-02-15

## 2024-02-15 RX ORDER — LUSPATERCEPT 75 MG/1
110 INJECTION, POWDER, LYOPHILIZED, FOR SOLUTION SUBCUTANEOUS ONCE
Refills: 0 | Status: COMPLETED | OUTPATIENT
Start: 2024-02-15 | End: 2024-02-15

## 2024-02-15 RX ADMIN — PREGABALIN 1000 MICROGRAM(S): 225 CAPSULE ORAL at 13:18

## 2024-02-15 RX ADMIN — LUSPATERCEPT 110 MILLIGRAM(S): 75 INJECTION, POWDER, LYOPHILIZED, FOR SOLUTION SUBCUTANEOUS at 13:18

## 2024-02-15 NOTE — ASSESSMENT
[FreeTextEntry1] : Patient is a 78 year old female with Lowrisk myelodysplastic syndrome, previously treated with Revlimid and Procrit and Vidaza. 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. Patient is s/p 9 cycles of Vidaza and she was requiring pRBC transfusion every 3 weeks. Given the persistent transfusion dependence she was switched to Luspatercept.  DETECTED GENOMIC ALTERATIONS: Tier III: Variants of Unknown Clinical Significance SF3B1 [id number]  Results of BM biopsy (3/30/22) showed No e/o increased blasts. Pt is needing PRBCs almost every 3 weeks. Advised to follow up with Dr Ferrell to consider any other option or clinical trial. Called Dr Ferrell's office and discussed case with her. No clinical trial available, Rec PRBCS.  S/P port placement on 3/1/23.  PLAN: Previous notes reviewed including all relevant laboratory results and were communicated to the patient.  -- Continue Luspatercept 1.75 mg/kg every 3 weeks, due today. CBC today reviewed; moderate anemia noted with Hgb 8.1 gm/dL. She does not need blood transfusion at this time. SE discussed including: hypertension, abdominal pain, diarrhea, nausea, increased serum alanine aminotransferase, increased serum aspartate aminotransferase, increased serum bilirubin, dizziness, fatigue, headache, vertigo, arthralgia, musculoskeletal pain, ostealgia, decreased creatinine clearance, cough, dyspnea.  The therapy dose was adjusted according to pt's labs and anticipated tolerance. The high risk of complications and complexity associated with this therapy administration has been explained to the patient and her daughter and they were both agreeable. Treatment will be administered under my direct supervision.  -- Hold Retacrit for now. -- Monitor CBC weekly and administer 1 pRBC unit as needed when Hgb < 7 gm/dL. Pt has e/o iron overload, so transfusions need to be kept at a minimum if possible. She is maintaining her requirement of PRBCs almost Q 3-4 weeks.  -- Continue to follow up with PCP and endo as recommended.  # Vitamin B 12 deficiency -- Continue Vitamin B 12 injection every 6 weeks, due today..  # Thrombocytosis (controlled) -- Previously on Hydrea. -- Platelet today reviewed 445 K/uL. -- Will continue to monitor.  #Chronic kidney disease -- Follow up with renal as recommended.  RTC in 3 weeks.

## 2024-02-15 NOTE — REVIEW OF SYSTEMS
[Fatigue] : fatigue [Negative] : Allergic/Immunologic [Recent Change In Weight] : ~T no recent weight change [Shortness Of Breath] : no shortness of breath [FreeTextEntry2] : Fatigue is unchanged from baseline.

## 2024-02-15 NOTE — PHYSICAL EXAM
[Ambulatory and capable of all self care but unable to carry out any work activities] : Status 2- Ambulatory and capable of all self care but unable to carry out any work activities. Up and about more than 50% of waking hours [Normal] : affect appropriate [de-identified] : On a wheelchair. [de-identified] : Lower extremity mild edema [de-identified] : Right chest port-A-cath intact, no erythema or tenderness noted.

## 2024-02-15 NOTE — HISTORY OF PRESENT ILLNESS
[de-identified] : This is a 75 year old  female with history of MDS. She's was previously treated with Revlimid 5 mg, 2 weeks on, 1 week off.  \par  She is also on Procrit.\par   She underwent colonoscopy (2/2017)  Results noted no colitis, only hyperplastic polyp noted. \par   \par   [de-identified] : She missed on appointment for procrit last week. She starts her next cycle on 6/25/18 C/o back pain radiating down her left which occurred when she bent to  something. No change in diarrhea.  7/31/18: Doing well. On Revlimid for more than 2yrs, 5 mg 2 weeks on 1 week off. She will be starting week on tonight.  C/o diarrhea. On Imodium 2 pills AM and 2 pills PM. Doesn't help much with diarrhea.  C/o nausea, abdominal pain.    Colonoscopy in 2016 was normal.  Did not have blood transfusion for over 2 years.  On procrit [id number] weekly. Missed last week on 7/24/18 as she was out of town. She has been non compliant with weekly Procrit. Mammogram in 2017 was normal.   9/11/18 pt is here for follow up. She feels the lomotil helps with the diarrhea. She was away for a few weeks and missed her procrit injections. No fever, abdominal  discomfort has been less since since use of lomotil. She started a new cycle 2 days ago.  10/2/18: She will start Revlimid tonight (week on). 2 weeks on, 1 week off.  On ASA 81mg.  Denies fever, nausea, vomiting, chest pain, SOB, abdominal pain, and bladder problems. C/o diarrhea.  S/p 2 units PRBC transfusion on 9/25/18.  On Procrit 10813 units weekly. Not compliant every week.  C/o intermittent dizziness.   10/31/18 Pt is here for follow up. C/O significant fatigue. Continues to have diarrhea, takes imodium and lomotil as needed. C/o dry cough, no fever. Cough started a month ago. It is worse in the mornings however over last week it has been constant all day. No chest pain. She was found to have low B12 levels and was started on B12 injections.  11/27/18: Doing well. Hospitalized for 3 days for cellulitis/abcess of the back s/p drainage and on Abx currently. Developed 3 days after Mg infusion as per pt.  Denies nausea, vomiting, chest pain, SOB, abdominal pain, bowel and bladder problems. This is the week on Revlimid (week 1). S/p 1 unit PRBC transfusion in the hospital.  On Procrit weekly   12/27/18: Doing well. No major complaints. Denies fever, nausea, vomiting, chest pain, SOB, abdominal pain, and bladder problems. On Revlimid 5 mg 2 weeks on 1 week off. This is the week off. She will start the week on sunday (12/30/18). Due for Procrit 23189 units today.  Still has diarrhea. 4-5bm/day. On monthly B12 injections.   1/30/19: Patient here for follow up for MDS.  She is taking Revlimid 5 mg, 2 weeks on, 1 week off.  She will complete this current cycle on Saturday.  She has no new complaints today.  Patient denies cough, shortness of breath, denies fever, denies bone pain.  03/04/2019 Patient is here for a follow up visit. She complaints of severe pain in her left shoulder and has had abscess drained 2 weeks ago. However the pain is worse and she has purulent discharge on examination. She also has Nasal sores that are painful. Culture from 11/2018 showed MRSA.  Denies Fever.  She also complaints of swelling in her right leg. Not tender and not erythematous and negative Gong;s sign.  Her diarrhea with Revlimid is ongoing and Imodium and Lomotil helps but not everyday.  She is on 60,000 units of procrit weekly and Revlimid 5 mg 2 weeks on 1 week off.   4/23/19 Pt is here for follow up. She feels well. The nasal sores have improved with bactroban The abscess on left shoulder has resolved. However she has a new one on the middle of her back. No fever. Pt has been on procrit 99206 units weekly, however she missed a dose in between.  5/8/19 pt is here for follow up. no new complaints. feels well. Her skin abscess has resolved. she has been unable to go to ID, as she could not get an appt. No bleeding. She is compliant with revlimid.  6/6/19 Pt is here for follow up. no new complaints  Feels well. Is on week 2 of her Revlimid cycle.  No transfusions since last visit  7/17/19 Pt is here for follow up. C/O fatigue. Was away for a few days two weeks ago, but missed treatment with procrit for 2 weeks. Is complaint with Revlimid 2 weeks on 1 week off. Diarrhea is a little improved.  8/14/19 Patient here for follow up visit, feeling well.  Although she is complaining of some pain at the left scapula area recently.  Patient denies cough, shortness of breath, denies fever, denies other bone pain.  She is off Revlimid  this week, due to restart on Monday.  She is scheduled for Procrit and Vitamin B12 injection today.  She plans to leave the country tomorrow to visit her mother who is ill.  She will return in about 10 days.  9/11/19 Pt is here for follow up. She was away for 3 weeks, out of which she took procrit for 2 weeks in [place]. She missed last week's dose. She had CBCs in [place] which showed Hgb of 8.9 She feels well. She still getting diarrhea. She has been using lomotil with very minimal relief. She started a new cycle of Revlimid 4 days ago.  10/3/19 Patient here for follow up visit, feeling fairly well.   Patient denies cough, shortness of breath, denies fever, denies other bone pain.  She remains on Revlimid.  She is scheduled for Procrit and Vitamin B12 injection today.  10/24/19 Pt is here for follow up. Feels well. No SOB, fatigue. Compliant with procrit and Revl;imid. Remains on ASa. Diarrhea is unchanged. Pt takes imodium as needed.  11/14/19 Pt is here for follow up. No new complaints. Starts a new cycle of Revlimid today. Diarrhea is same as before, no rectal bleeding. No fever.  12/5/19 Pt is here for follow up. No new complaints. Denies feeling weaker than usual. No fever, SOB. No change in frequency of diarrhea. No pain. Will start next cycle of Revlimid in 3 days.   !/16/20 Pt was recently DCed from [facility] 3 days ago after being treated for pneumonia and MARCO. She is on po Levaquin. She restarted Revlmid 3 days ago. She is feeling better now. No fever. No SOB, Chest pain and back pain has resolved. Pt has a cough, no expectoration. She also recd a unit of PRBCs last week in the hospital   1/30/20 Patient here for follow up visit, feeling well.  She has no new complaints. Cough is almost gone completely.  Patient denies shortness of breath, denies fever, denies bone pain.  Her appetite is ok, weight is stable.  She is due to restart Revlimid on Monday.  02/20/20 Pt is here for follow up, feeling well. Offers no new complaints. Denies SOB, fever, chills, weight loss, CP, cough.  Currently off week of Revlimid 5 mg. Restarts on Monday. Has procrit 80,000 units today. Next b 12 injection due in 2 weeks  Did not get chest Xray CBC reviewed WBC 3.1, h/h 8.3/25%, plt 386, neutrophils 1.29  3/12/20 Pt is here for follow up. She took Revlimid for 2 weeks and then has been off for one week although she was advised to take it daily without break. No SOB, Cough, fever. Has mild fatigue.  04/02/2020 SIMONA KUHN a 75 year F is here today for follow up visit of MDS. Denies fever, chills, cough,night sweats, weight loss.  Denies bleeding or bruising. Pt receives procrit 80,000 units weekly and B12 IM monthly.  Continued Revlimid 5 mg daily x 3 weeks, has 1 dose left tonight.  CBC reviewed: WBC 3.2, H/H 8.1/25.2, neutrophils 1.6, PLT 72  4/20/2020: The patient is here for follow up . She has no complaints of fever, chills, dizziness , chest pain and shortness of breath . She is still with diarrhea , up to 10 watery BMs per day, responds poorly to imodium and lomotil. She is on Revlimid 5 mg daily , took last dose yesterday night. Her last Procrit was on April 13.   5/26/20 Pt is here for follow up. She is feeling well. Denies SOB, chest pain, fever. Continues to have diarrhea although she is off of Revlmid. Thinks it may due to diabetic meds. Wants to discuss BM bx results  6/22/20 Pt is here for follow up. Feels well. Denies any fever, N, V, D Continues to have diarrhea, she will talk to her PCP about changing metformin for DM. Has been needing PRBCs 1 unit each month  7/20/20 Pt is here for follow up. No new complaints. Has been feeling well. No SOB, CP.  No N, V, D. She has recd 2 units of PRBCs since May 20.  8/17/20 Pt is here for a follow up visit. She is feeling well. No new symptoms. No N, V, D. No bruising or bleeding. She continues to need PRBCs every 2-3 weeks.  8/31/20 Pt is here for follow up. She is feeling well. No N, V, D. She is tolerating the hydrea well. No SOB, CP No bruising ior bleeding  9/8/20 Pt is here for follow up. She had been contacted by the [facility] last week to advise her to stop the hydrea. Pt did not check her messages and continued with Hydrea. No fever, N, V, bleeding. Saw Dr Ferrell last week.  9/14/20 Pt is here for folow up. Besides her usual complaint of diarrhea she is feeling well. Recd 1 unit PRBC last week. Has stopped Hydrea. No fever, mouth sores.  9/29/20 Pt is here for folow up. No new complaints. Is seeing GI for diarrhea net week. No fever, night sweats. REcd 3 units of PRBC this month  10/13/20 Pt is here for follow up. No new complaints. Has not needed a transfusion since 3 weeks ago. Continues to have diarrhea, waiting to be seen by GI  10/26/20 Pt is here for follow up. C/O feeling fatigued. Has CLARK. No nausea or vomiting. No fever. Diarrhea has improved a little. She is no longer on Metformin  11/9/20 Pt is here for follow up. Feels well. Denies SOB, CP, fatigue  12/7/20- Patient is for follow up and is due for cycle 9 of Vidaza.  She still has loose BM sometimes 10 times a day and was seeing Dr. Corey from GI- did not follow up recently and is unable to get an appt with him. She has lost about 10 lbs since September. No N/V. No fever or chills. No CP/SOB. She has been getting PRBC transfusion every 3 weeks now  01/04/20 Pt presented today for f/u visit . She is s/p 8 cycles of vidaza and was started on Luspatercept in Dec , 2020 . So far she received 1 injection , she is due for 2nd injection today . Adverse effect profile was discussed with her in detail , she reports having some dizziness and weakness after first injection . She received blood transfusion last wk . Blood work from today reviewed as well and counts are adequate . She denies any fevers,  chills,  or any new symptoms .   1/25/21 Pt is here for follow up. C/O diarrhea, otherwise feeling better. Has not needed a transfusion since 12/29 2/16/21 Pt is here for follow up. Needed transfusion on 2/8/21. Continues to C/o fatigue. Diarrhea remains the same, has not made GI appt as yet.  3/8/21 Pt is here for follow up. Feels better today. Recd 1 unit PRBCs last week. Diarrhea is same as before. has an appt with GI next week. No fever  3/30/21 Pt is here for follow up. Feels better. Last transfusion was on 3/2/21 Saw GI and was started on cholestyramine and metformin was DCED with resolution of diarrhea. She denies any SOB, Fatigue is better  4/20/2021 Pt is here to f/u for MDS She is s/p Luspatercept cycle #6 She is compliant with Aspirin She feels better today, appetite is good She denies shortness of breath, fatigue, or weakness  She c/o of diarrhea but it has improved since she was started on Cholestyramine. Stool is soft and less in frequency She received COVID vaccine x 2 doses  5/11/21 pt is here for follow up. Feels the same with fatigue, diarrhea. No SOB  6/21/21 Pt is here for follow up. Feels well. No SOB, CP, N< V. Diarrhea is better controlled now. Last PRBC transfusion was 2 weeks ago.  7/19/21 Pt is here for follow up. Feels a little tired, last transfusion was 6/1/21. No bleeding, fever, SOB  8/10/2021 Patient is here to follow up for her MDS. She is on Luspatercept, tolerating well. She feels well today, the appetite is good. She denies shortness of breath, fatigue, fever, night sweats, abdominal pain, arthralgias/myalgias.  8/31/21 Pt is here for follow up. C/O feeling very fatigued. No bleeding. No fever.  9/21/2021 Patient is here to follow up for MDS. She is on Luspatercept and Retacrit, tolerating well. She gets blood transfusion as needed for Hgb <7 gm/dL She is c/o of fatigue, no shortness of breath, dizziness, chills or lightheadedness. She denies melena or hematochezia. Her appetite is good, no nausea/vomiting.  10/28/2021 Patient is here to follow up for MDS. She is on Luspatercept and Retacrit, tolerating well. She gets blood transfusion to keep Hgb > 7 gm/dL. She is c/o of chronic fatigue, no shortness of breath, dizziness, chills or lightheadedness. She denies melena or hematochezia. Her appetite is good, no nausea/vomiting. She c/o of severe diarrhea, 10-12x/day watery stool while on Imodium in the last 2 weeks. Last colonoscopy was in 2016, benign as per patient.  11/18/21 Pt is here for follow up. C/O fatigue. No SOB, CP  12/9/21 Pt is here for follow up. Feels better today. Recd 2 units last week in ER. Planning to go to [place] for over a week and does not want to miss her procrit dose. Diarrhea is stable,  12/23/21 Pt is here for follow up. Feels better. No SOB, CP. Going to [place] tomorrow. Has not been able to get Procrit injection from the pharmacy yet d/t insurance issues  2/3/22 Pt is here for follow up. Feeling same as usual. No SOB, CP. Last transfusion was 2 weeks ago in ER> No bleeding reported  3/3/22 Pt is here for follow up. C/O fatigue. Had black stools X2  3/30/22 Pt is feeling well. Here for treatment and BM biopsy  4/21/22 Pt is here fir follow up. Was in ER last week. Was give 2 units of PRBCs. Pt feels less tired today. Wants to discuss BM rslts  5/12/22 Pt is here today for follow up visit. Pt c/o feeling tired.  Hgb=6.9.  One unit transfusion scheduled.  6/23/22 Pt is here for follow up. Feels well today. No SOB, chest pain. Has not made appt with [facility]  7/14/22 Pt is here for follow up for lowrisk myelodysplastic syndrome. She came in late today because she was not feeling well this morning- dizziness & weakness. She denies SOB, CP.  9/22/22 Pt is here for follow up. C/O fatigue. was in the ER 3 weeks ago and recd 2 U PRBCS  11/10/22 Patient is here to follow up for MDS/MPN. S/P 2 units pRBC last weekend, feeling much better now. She denies shortness of breath, chest pain or headache.  1/5/2023 Patient is here to follow up for MDS/MPN, accompanied by spouse and daughter. She is feeling much better after she received blood transfusion on 12/29/22. She denies shortness of breath, chest pain, dizziness or headache. Pt fell on 12/28/22 when she slipped while getting down the stairs and landed on her right knee. Now c/o of severe right knee pain with limited ROM. She has not been evaluated since the incident happened. She is taking Aleeve with minimal relief.  1/19/2023 Patient is here to follow up for MDS/MPN and treatment. She feel fine, has fatigue not worse than her baseline. She denies shortness of breath, chest pain, dizziness, headache or bleeding. She was evaluated at the ER on 1/5/2023 for her fall and imaging showed no fracture, except for mild joint effusion.  2/9/23 Patient presents for follow up today. She complains of light-headedness. Her BP is elevated to 180/70 mmHg. She does not take any meds and says she is not diagnosed with HTN. She is due for Procrit & Luspatercept today. Reports her energy level is at baseline. No other complaints today. Her CMP showed serum glucose > 600 mg/dl, she was sent to ER with the transport.  3/2/32 Patient here for follow up for MDS. She is scheduled for next luspatercept injection. She is status post port placement yesterday, so she has some discomfort at chest area from that. She feels somewhat weak, appetite is adequate, weight is stable. She is under the care of endocrinologist to try to get her DM under better control. She was recommended to take ASA 81 mg when she was discharged from the hospital.  3/23/23 Patient here for follow up for MDS. She is scheduled for next Luspatercept injection. She is status post port placement She feels somewhat weak, appetite is adequate, weight is stable. She is under the care of endocrinologist to try to get her DM under better control. She was recommended to take ASA 81 mg when she was discharged from the hospital.  4/13/23 Pt is here for follow up. Daughter is with her. Neuro eval has not been completed yet. No bleeding, SOB, pain recd 2 Units PRBCs 2 weeks ago  5/4/23 Pt is here for follow up. Pt C/O not feeling well today. Denies pain. Has dizziness. Fasting bld sugar was over 350. She is not on insulin. Denies diarrhea, N, SOB, CP  5/25/23 Pt is here for follow up, accompanied by formal caregiver. She feels well except for fatigue which is unchanged from baseline. She denies chest pain, shortness of breath, bleeding or decreased urinary output. She states that she saw nephro last week and follows up with her PCP for her diabetes.  6/15/23 Pt is here for follow up. Feels weak, No SOB, CP  8/3/23 Pt is here for follow up Feels fatigued. Has. chronic diarrhea which has been attributed to her diabetes meds  8/30/23 Pt is here for follow C/O diarrhea and fatigue  11/7/2023 Patient is here for follow up. She is s/p 1 unit pRBC on 10/26/23. She feels fatigue, unchanged from her baseline. She denies shortness of breath, dizziness, lightheadedness or chest pain.  11/28/23 Patient is here for follow up. She continues to report sx of fatigue, unchanged from her baseline. She denies shortness of breath, dizziness, lightheadedness or chest pain. Patient endorses sensation of abdominal discomfort and general feeling of feeling unwell unable to further describe / elaborate   1/4/24 Patient is here to follow up for MDS/MPN, accompanied by spouse. She feels more fatigue and dizzy, denies shortness of breath, chest pain, bleeding of palpitations,. She did not report to the ER yesterday for blood transfusion as advised for Hgb 6.4 gm/dL.  1/25/24 Patient is here to follow up for MDS/MPN, accompanied by her caregiver. She feels fatigue which is unchanged from her baseline, denies shortness of breath, chest pain, bleeding of palpitations, Her last blood transfusion was on 1/4/24 int he ER. She had seen the nephrologist and was recommended further lab test.  2/15/24 Patient is here to follow up for MDS/MPN, accompanied by her caregiver. She feels fatigue which is unchanged from her baseline, denies shortness of breath, chest pain, bleeding or palpitations, Her last blood transfusion was on 2/1/24 in the ER.

## 2024-02-16 DIAGNOSIS — N18.32 CHRONIC KIDNEY DISEASE, STAGE 3B: ICD-10-CM

## 2024-02-16 DIAGNOSIS — D46.9 MYELODYSPLASTIC SYNDROME, UNSPECIFIED: ICD-10-CM

## 2024-02-16 LAB
25(OH)D3 SERPL-MCNC: 18 NG/ML
ALBUMIN SERPL ELPH-MCNC: 4.4 G/DL
ALP BLD-CCNC: 110 U/L
ALT SERPL-CCNC: 12 U/L
ANION GAP SERPL CALC-SCNC: 14 MMOL/L
APPEARANCE: CLEAR
AST SERPL-CCNC: 16 U/L
BACTERIA: NEGATIVE /HPF
BILIRUB SERPL-MCNC: 0.7 MG/DL
BILIRUBIN URINE: NEGATIVE
BLOOD URINE: NEGATIVE
BUN SERPL-MCNC: 36 MG/DL
CALCIUM SERPL-MCNC: 9.3 MG/DL
CAST: 0 /LPF
CHLORIDE SERPL-SCNC: 101 MMOL/L
CHOLEST SERPL-MCNC: 134 MG/DL
CO2 SERPL-SCNC: 24 MMOL/L
COLOR: YELLOW
CREAT SERPL-MCNC: 1.5 MG/DL
CREAT SPEC-SCNC: 60 MG/DL
CREAT/PROT UR: 1.7 RATIO
CYSTATIN C SERPL-MCNC: 2.38 MG/L
EGFR: 36 ML/MIN/1.73M2
EPITHELIAL CELLS: 2 /HPF
ESTIMATED AVERAGE GLUCOSE: 146 MG/DL
GFR/BSA.PRED SERPLBLD CYS-BASED-ARV: 21 ML/MIN/1.73M2
GLUCOSE QUALITATIVE U: 100 MG/DL
GLUCOSE SERPL-MCNC: 70 MG/DL
HBA1C MFR BLD HPLC: 6.7 %
HDLC SERPL-MCNC: 60 MG/DL
KETONES URINE: NEGATIVE MG/DL
LDLC SERPL CALC-MCNC: 45 MG/DL
LEUKOCYTE ESTERASE URINE: ABNORMAL
MICROSCOPIC-UA: NORMAL
NITRITE URINE: NEGATIVE
NONHDLC SERPL-MCNC: 74 MG/DL
PH URINE: 6.5
POTASSIUM SERPL-SCNC: 4.7 MMOL/L
PROT SERPL-MCNC: 6.6 G/DL
PROT UR-MCNC: 100 MG/DLG/24H
PROTEIN URINE: 100 MG/DL
RED BLOOD CELLS URINE: 1 /HPF
SODIUM SERPL-SCNC: 139 MMOL/L
SPECIFIC GRAVITY URINE: 1.01
TRIGL SERPL-MCNC: 146 MG/DL
UROBILINOGEN URINE: 0.2 MG/DL
WHITE BLOOD CELLS URINE: 12 /HPF

## 2024-02-19 LAB
BASOPHILS # BLD AUTO: 0.12 K/UL
BASOPHILS NFR BLD AUTO: 1.2 %
EOSINOPHIL # BLD AUTO: 0.1 K/UL
EOSINOPHIL NFR BLD AUTO: 1 %
HCT VFR BLD CALC: 25.8 %
HGB BLD-MCNC: 8.1 G/DL
IMM GRANULOCYTES NFR BLD AUTO: 2.7 %
LYMPHOCYTES # BLD AUTO: 1.54 K/UL
LYMPHOCYTES NFR BLD AUTO: 14.8 %
MAN DIFF?: NORMAL
MCHC RBC-ENTMCNC: 29.6 PG
MCHC RBC-ENTMCNC: 31.4 G/DL
MCV RBC AUTO: 94.2 FL
MONOCYTES # BLD AUTO: 1.35 K/UL
MONOCYTES NFR BLD AUTO: 13 %
NEUTROPHILS # BLD AUTO: 6.99 K/UL
NEUTROPHILS NFR BLD AUTO: 67.3 %
PLATELET # BLD AUTO: 445 K/UL
PMV BLD AUTO: 0 /100 WBCS
RBC # BLD: 2.74 M/UL
RBC # FLD: 18.9 %
WBC # FLD AUTO: 10.38 K/UL

## 2024-03-07 ENCOUNTER — OUTPATIENT (OUTPATIENT)
Dept: OUTPATIENT SERVICES | Facility: HOSPITAL | Age: 79
LOS: 1 days | End: 2024-03-07
Payer: MEDICARE

## 2024-03-07 ENCOUNTER — APPOINTMENT (OUTPATIENT)
Dept: INFUSION THERAPY | Facility: CLINIC | Age: 79
End: 2024-03-07

## 2024-03-07 ENCOUNTER — APPOINTMENT (OUTPATIENT)
Dept: HEMATOLOGY ONCOLOGY | Facility: CLINIC | Age: 79
End: 2024-03-07
Payer: MEDICARE

## 2024-03-07 VITALS
HEART RATE: 95 BPM | HEIGHT: 62 IN | SYSTOLIC BLOOD PRESSURE: 147 MMHG | TEMPERATURE: 97.6 F | DIASTOLIC BLOOD PRESSURE: 84 MMHG

## 2024-03-07 DIAGNOSIS — Z98.890 OTHER SPECIFIED POSTPROCEDURAL STATES: Chronic | ICD-10-CM

## 2024-03-07 DIAGNOSIS — C46.9 KAPOSI'S SARCOMA, UNSPECIFIED: ICD-10-CM

## 2024-03-07 DIAGNOSIS — D46.9 MYELODYSPLASTIC SYNDROME, UNSPECIFIED: ICD-10-CM

## 2024-03-07 DIAGNOSIS — E53.8 DEFICIENCY OF OTHER SPECIFIED B GROUP VITAMINS: ICD-10-CM

## 2024-03-07 PROCEDURE — G2211 COMPLEX E/M VISIT ADD ON: CPT

## 2024-03-07 PROCEDURE — 96401 CHEMO ANTI-NEOPL SQ/IM: CPT

## 2024-03-07 PROCEDURE — 36430 TRANSFUSION BLD/BLD COMPNT: CPT

## 2024-03-07 PROCEDURE — 36415 COLL VENOUS BLD VENIPUNCTURE: CPT

## 2024-03-07 PROCEDURE — 99215 OFFICE O/P EST HI 40 MIN: CPT

## 2024-03-07 PROCEDURE — P9040: CPT

## 2024-03-07 PROCEDURE — 86923 COMPATIBILITY TEST ELECTRIC: CPT

## 2024-03-07 RX ORDER — LUSPATERCEPT 75 MG/1
110 INJECTION, POWDER, LYOPHILIZED, FOR SOLUTION SUBCUTANEOUS ONCE
Refills: 0 | Status: COMPLETED | OUTPATIENT
Start: 2024-03-07 | End: 2024-03-07

## 2024-03-07 RX ADMIN — LUSPATERCEPT 110 MILLIGRAM(S): 75 INJECTION, POWDER, LYOPHILIZED, FOR SOLUTION SUBCUTANEOUS at 13:18

## 2024-03-07 NOTE — PHYSICAL EXAM
[Ambulatory and capable of all self care but unable to carry out any work activities] : Status 2- Ambulatory and capable of all self care but unable to carry out any work activities. Up and about more than 50% of waking hours [Normal] : affect appropriate [de-identified] : Lower extremity mild edema [de-identified] : Right chest port-A-cath intact, no erythema or tenderness noted.

## 2024-03-07 NOTE — HISTORY OF PRESENT ILLNESS
[de-identified] : This is a 75 year old  female with history of MDS. She's was previously treated with Revlimid 5 mg, 2 weeks on, 1 week off.  \par  She is also on Procrit.\par   She underwent colonoscopy (2/2017)  Results noted no colitis, only hyperplastic polyp noted. \par   \par   [de-identified] : She missed on appointment for procrit last week. She starts her next cycle on 6/25/18 C/o back pain radiating down her left which occurred when she bent to  something. No change in diarrhea.  7/31/18: Doing well. On Revlimid for more than 2yrs, 5 mg 2 weeks on 1 week off. She will be starting week on tonight.  C/o diarrhea. On Imodium 2 pills AM and 2 pills PM. Doesn't help much with diarrhea.  C/o nausea, abdominal pain.    Colonoscopy in 2016 was normal.  Did not have blood transfusion for over 2 years.  On procrit [id number] weekly. Missed last week on 7/24/18 as she was out of town. She has been non compliant with weekly Procrit. Mammogram in 2017 was normal.   9/11/18 pt is here for follow up. She feels the lomotil helps with the diarrhea. She was away for a few weeks and missed her procrit injections. No fever, abdominal  discomfort has been less since since use of lomotil. She started a new cycle 2 days ago.  10/2/18: She will start Revlimid tonight (week on). 2 weeks on, 1 week off.  On ASA 81mg.  Denies fever, nausea, vomiting, chest pain, SOB, abdominal pain, and bladder problems. C/o diarrhea.  S/p 2 units PRBC transfusion on 9/25/18.  On Procrit 21962 units weekly. Not compliant every week.  C/o intermittent dizziness.   10/31/18 Pt is here for follow up. C/O significant fatigue. Continues to have diarrhea, takes imodium and lomotil as needed. C/o dry cough, no fever. Cough started a month ago. It is worse in the mornings however over last week it has been constant all day. No chest pain. She was found to have low B12 levels and was started on B12 injections.  11/27/18: Doing well. Hospitalized for 3 days for cellulitis/abcess of the back s/p drainage and on Abx currently. Developed 3 days after Mg infusion as per pt.  Denies nausea, vomiting, chest pain, SOB, abdominal pain, bowel and bladder problems. This is the week on Revlimid (week 1). S/p 1 unit PRBC transfusion in the hospital.  On Procrit weekly   12/27/18: Doing well. No major complaints. Denies fever, nausea, vomiting, chest pain, SOB, abdominal pain, and bladder problems. On Revlimid 5 mg 2 weeks on 1 week off. This is the week off. She will start the week on sunday (12/30/18). Due for Procrit 28484 units today.  Still has diarrhea. 4-5bm/day. On monthly B12 injections.   1/30/19: Patient here for follow up for MDS.  She is taking Revlimid 5 mg, 2 weeks on, 1 week off.  She will complete this current cycle on Saturday.  She has no new complaints today.  Patient denies cough, shortness of breath, denies fever, denies bone pain.  03/04/2019 Patient is here for a follow up visit. She complaints of severe pain in her left shoulder and has had abscess drained 2 weeks ago. However the pain is worse and she has purulent discharge on examination. She also has Nasal sores that are painful. Culture from 11/2018 showed MRSA.  Denies Fever.  She also complaints of swelling in her right leg. Not tender and not erythematous and negative Gong;s sign.  Her diarrhea with Revlimid is ongoing and Imodium and Lomotil helps but not everyday.  She is on 60,000 units of procrit weekly and Revlimid 5 mg 2 weeks on 1 week off.   4/23/19 Pt is here for follow up. She feels well. The nasal sores have improved with bactroban The abscess on left shoulder has resolved. However she has a new one on the middle of her back. No fever. Pt has been on procrit 13909 units weekly, however she missed a dose in between.  5/8/19 pt is here for follow up. no new complaints. feels well. Her skin abscess has resolved. she has been unable to go to ID, as she could not get an appt. No bleeding. She is compliant with revlimid.  6/6/19 Pt is here for follow up. no new complaints  Feels well. Is on week 2 of her Revlimid cycle.  No transfusions since last visit  7/17/19 Pt is here for follow up. C/O fatigue. Was away for a few days two weeks ago, but missed treatment with procrit for 2 weeks. Is complaint with Revlimid 2 weeks on 1 week off. Diarrhea is a little improved.  8/14/19 Patient here for follow up visit, feeling well.  Although she is complaining of some pain at the left scapula area recently.  Patient denies cough, shortness of breath, denies fever, denies other bone pain.  She is off Revlimid  this week, due to restart on Monday.  She is scheduled for Procrit and Vitamin B12 injection today.  She plans to leave the country tomorrow to visit her mother who is ill.  She will return in about 10 days.  9/11/19 Pt is here for follow up. She was away for 3 weeks, out of which she took procrit for 2 weeks in [place]. She missed last week's dose. She had CBCs in [place] which showed Hgb of 8.9 She feels well. She still getting diarrhea. She has been using lomotil with very minimal relief. She started a new cycle of Revlimid 4 days ago.  10/3/19 Patient here for follow up visit, feeling fairly well.   Patient denies cough, shortness of breath, denies fever, denies other bone pain.  She remains on Revlimid.  She is scheduled for Procrit and Vitamin B12 injection today.  10/24/19 Pt is here for follow up. Feels well. No SOB, fatigue. Compliant with procrit and Revl;imid. Remains on ASa. Diarrhea is unchanged. Pt takes imodium as needed.  11/14/19 Pt is here for follow up. No new complaints. Starts a new cycle of Revlimid today. Diarrhea is same as before, no rectal bleeding. No fever.  12/5/19 Pt is here for follow up. No new complaints. Denies feeling weaker than usual. No fever, SOB. No change in frequency of diarrhea. No pain. Will start next cycle of Revlimid in 3 days.   !/16/20 Pt was recently DCed from [facility] 3 days ago after being treated for pneumonia and MARCO. She is on po Levaquin. She restarted Revlmid 3 days ago. She is feeling better now. No fever. No SOB, Chest pain and back pain has resolved. Pt has a cough, no expectoration. She also recd a unit of PRBCs last week in the hospital   1/30/20 Patient here for follow up visit, feeling well.  She has no new complaints. Cough is almost gone completely.  Patient denies shortness of breath, denies fever, denies bone pain.  Her appetite is ok, weight is stable.  She is due to restart Revlimid on Monday.  02/20/20 Pt is here for follow up, feeling well. Offers no new complaints. Denies SOB, fever, chills, weight loss, CP, cough.  Currently off week of Revlimid 5 mg. Restarts on Monday. Has procrit 80,000 units today. Next b 12 injection due in 2 weeks  Did not get chest Xray CBC reviewed WBC 3.1, h/h 8.3/25%, plt 386, neutrophils 1.29  3/12/20 Pt is here for follow up. She took Revlimid for 2 weeks and then has been off for one week although she was advised to take it daily without break. No SOB, Cough, fever. Has mild fatigue.  04/02/2020 SIMONA KUHN a 75 year F is here today for follow up visit of MDS. Denies fever, chills, cough,night sweats, weight loss.  Denies bleeding or bruising. Pt receives procrit 80,000 units weekly and B12 IM monthly.  Continued Revlimid 5 mg daily x 3 weeks, has 1 dose left tonight.  CBC reviewed: WBC 3.2, H/H 8.1/25.2, neutrophils 1.6, PLT 72  4/20/2020: The patient is here for follow up . She has no complaints of fever, chills, dizziness , chest pain and shortness of breath . She is still with diarrhea , up to 10 watery BMs per day, responds poorly to imodium and lomotil. She is on Revlimid 5 mg daily , took last dose yesterday night. Her last Procrit was on April 13.   5/26/20 Pt is here for follow up. She is feeling well. Denies SOB, chest pain, fever. Continues to have diarrhea although she is off of Revlmid. Thinks it may due to diabetic meds. Wants to discuss BM bx results  6/22/20 Pt is here for follow up. Feels well. Denies any fever, N, V, D Continues to have diarrhea, she will talk to her PCP about changing metformin for DM. Has been needing PRBCs 1 unit each month  7/20/20 Pt is here for follow up. No new complaints. Has been feeling well. No SOB, CP.  No N, V, D. She has recd 2 units of PRBCs since May 20.  8/17/20 Pt is here for a follow up visit. She is feeling well. No new symptoms. No N, V, D. No bruising or bleeding. She continues to need PRBCs every 2-3 weeks.  8/31/20 Pt is here for follow up. She is feeling well. No N, V, D. She is tolerating the hydrea well. No SOB, CP No bruising ior bleeding  9/8/20 Pt is here for follow up. She had been contacted by the [facility] last week to advise her to stop the hydrea. Pt did not check her messages and continued with Hydrea. No fever, N, V, bleeding. Saw Dr Ferrell last week.  9/14/20 Pt is here for folow up. Besides her usual complaint of diarrhea she is feeling well. Recd 1 unit PRBC last week. Has stopped Hydrea. No fever, mouth sores.  9/29/20 Pt is here for folow up. No new complaints. Is seeing GI for diarrhea net week. No fever, night sweats. REcd 3 units of PRBC this month  10/13/20 Pt is here for follow up. No new complaints. Has not needed a transfusion since 3 weeks ago. Continues to have diarrhea, waiting to be seen by GI  10/26/20 Pt is here for follow up. C/O feeling fatigued. Has CLARK. No nausea or vomiting. No fever. Diarrhea has improved a little. She is no longer on Metformin  11/9/20 Pt is here for follow up. Feels well. Denies SOB, CP, fatigue  12/7/20- Patient is for follow up and is due for cycle 9 of Vidaza.  She still has loose BM sometimes 10 times a day and was seeing Dr. Corey from GI- did not follow up recently and is unable to get an appt with him. She has lost about 10 lbs since September. No N/V. No fever or chills. No CP/SOB. She has been getting PRBC transfusion every 3 weeks now  01/04/20 Pt presented today for f/u visit . She is s/p 8 cycles of vidaza and was started on Luspatercept in Dec , 2020 . So far she received 1 injection , she is due for 2nd injection today . Adverse effect profile was discussed with her in detail , she reports having some dizziness and weakness after first injection . She received blood transfusion last wk . Blood work from today reviewed as well and counts are adequate . She denies any fevers,  chills,  or any new symptoms .   1/25/21 Pt is here for follow up. C/O diarrhea, otherwise feeling better. Has not needed a transfusion since 12/29 2/16/21 Pt is here for follow up. Needed transfusion on 2/8/21. Continues to C/o fatigue. Diarrhea remains the same, has not made GI appt as yet.  3/8/21 Pt is here for follow up. Feels better today. Recd 1 unit PRBCs last week. Diarrhea is same as before. has an appt with GI next week. No fever  3/30/21 Pt is here for follow up. Feels better. Last transfusion was on 3/2/21 Saw GI and was started on cholestyramine and metformin was DCED with resolution of diarrhea. She denies any SOB, Fatigue is better  4/20/2021 Pt is here to f/u for MDS She is s/p Luspatercept cycle #6 She is compliant with Aspirin She feels better today, appetite is good She denies shortness of breath, fatigue, or weakness  She c/o of diarrhea but it has improved since she was started on Cholestyramine. Stool is soft and less in frequency She received COVID vaccine x 2 doses  5/11/21 pt is here for follow up. Feels the same with fatigue, diarrhea. No SOB  6/21/21 Pt is here for follow up. Feels well. No SOB, CP, N< V. Diarrhea is better controlled now. Last PRBC transfusion was 2 weeks ago.  7/19/21 Pt is here for follow up. Feels a little tired, last transfusion was 6/1/21. No bleeding, fever, SOB  8/10/2021 Patient is here to follow up for her MDS. She is on Luspatercept, tolerating well. She feels well today, the appetite is good. She denies shortness of breath, fatigue, fever, night sweats, abdominal pain, arthralgias/myalgias.  8/31/21 Pt is here for follow up. C/O feeling very fatigued. No bleeding. No fever.  9/21/2021 Patient is here to follow up for MDS. She is on Luspatercept and Retacrit, tolerating well. She gets blood transfusion as needed for Hgb <7 gm/dL She is c/o of fatigue, no shortness of breath, dizziness, chills or lightheadedness. She denies melena or hematochezia. Her appetite is good, no nausea/vomiting.  10/28/2021 Patient is here to follow up for MDS. She is on Luspatercept and Retacrit, tolerating well. She gets blood transfusion to keep Hgb > 7 gm/dL. She is c/o of chronic fatigue, no shortness of breath, dizziness, chills or lightheadedness. She denies melena or hematochezia. Her appetite is good, no nausea/vomiting. She c/o of severe diarrhea, 10-12x/day watery stool while on Imodium in the last 2 weeks. Last colonoscopy was in 2016, benign as per patient.  11/18/21 Pt is here for follow up. C/O fatigue. No SOB, CP  12/9/21 Pt is here for follow up. Feels better today. Recd 2 units last week in ER. Planning to go to [place] for over a week and does not want to miss her procrit dose. Diarrhea is stable,  12/23/21 Pt is here for follow up. Feels better. No SOB, CP. Going to [place] tomorrow. Has not been able to get Procrit injection from the pharmacy yet d/t insurance issues  2/3/22 Pt is here for follow up. Feeling same as usual. No SOB, CP. Last transfusion was 2 weeks ago in ER> No bleeding reported  3/3/22 Pt is here for follow up. C/O fatigue. Had black stools X2  3/30/22 Pt is feeling well. Here for treatment and BM biopsy  4/21/22 Pt is here fir follow up. Was in ER last week. Was give 2 units of PRBCs. Pt feels less tired today. Wants to discuss BM rslts  5/12/22 Pt is here today for follow up visit. Pt c/o feeling tired.  Hgb=6.9.  One unit transfusion scheduled.  6/23/22 Pt is here for follow up. Feels well today. No SOB, chest pain. Has not made appt with [facility]  7/14/22 Pt is here for follow up for lowrisk myelodysplastic syndrome. She came in late today because she was not feeling well this morning- dizziness & weakness. She denies SOB, CP.  9/22/22 Pt is here for follow up. C/O fatigue. was in the ER 3 weeks ago and recd 2 U PRBCS  11/10/22 Patient is here to follow up for MDS/MPN. S/P 2 units pRBC last weekend, feeling much better now. She denies shortness of breath, chest pain or headache.  1/5/2023 Patient is here to follow up for MDS/MPN, accompanied by spouse and daughter. She is feeling much better after she received blood transfusion on 12/29/22. She denies shortness of breath, chest pain, dizziness or headache. Pt fell on 12/28/22 when she slipped while getting down the stairs and landed on her right knee. Now c/o of severe right knee pain with limited ROM. She has not been evaluated since the incident happened. She is taking Aleeve with minimal relief.  1/19/2023 Patient is here to follow up for MDS/MPN and treatment. She feel fine, has fatigue not worse than her baseline. She denies shortness of breath, chest pain, dizziness, headache or bleeding. She was evaluated at the ER on 1/5/2023 for her fall and imaging showed no fracture, except for mild joint effusion.  2/9/23 Patient presents for follow up today. She complains of light-headedness. Her BP is elevated to 180/70 mmHg. She does not take any meds and says she is not diagnosed with HTN. She is due for Procrit & Luspatercept today. Reports her energy level is at baseline. No other complaints today. Her CMP showed serum glucose > 600 mg/dl, she was sent to ER with the transport.  3/2/32 Patient here for follow up for MDS. She is scheduled for next luspatercept injection. She is status post port placement yesterday, so she has some discomfort at chest area from that. She feels somewhat weak, appetite is adequate, weight is stable. She is under the care of endocrinologist to try to get her DM under better control. She was recommended to take ASA 81 mg when she was discharged from the hospital.  3/23/23 Patient here for follow up for MDS. She is scheduled for next Luspatercept injection. She is status post port placement She feels somewhat weak, appetite is adequate, weight is stable. She is under the care of endocrinologist to try to get her DM under better control. She was recommended to take ASA 81 mg when she was discharged from the hospital.  4/13/23 Pt is here for follow up. Daughter is with her. Neuro eval has not been completed yet. No bleeding, SOB, pain recd 2 Units PRBCs 2 weeks ago  5/4/23 Pt is here for follow up. Pt C/O not feeling well today. Denies pain. Has dizziness. Fasting bld sugar was over 350. She is not on insulin. Denies diarrhea, N, SOB, CP  5/25/23 Pt is here for follow up, accompanied by formal caregiver. She feels well except for fatigue which is unchanged from baseline. She denies chest pain, shortness of breath, bleeding or decreased urinary output. She states that she saw nephro last week and follows up with her PCP for her diabetes.  6/15/23 Pt is here for follow up. Feels weak, No SOB, CP  8/3/23 Pt is here for follow up Feels fatigued. Has. chronic diarrhea which has been attributed to her diabetes meds  8/30/23 Pt is here for follow C/O diarrhea and fatigue  11/7/2023 Patient is here for follow up. She is s/p 1 unit pRBC on 10/26/23. She feels fatigue, unchanged from her baseline. She denies shortness of breath, dizziness, lightheadedness or chest pain.  11/28/23 Patient is here for follow up. She continues to report sx of fatigue, unchanged from her baseline. She denies shortness of breath, dizziness, lightheadedness or chest pain. Patient endorses sensation of abdominal discomfort and general feeling of feeling unwell unable to further describe / elaborate   1/4/24 Patient is here to follow up for MDS/MPN, accompanied by spouse. She feels more fatigue and dizzy, denies shortness of breath, chest pain, bleeding of palpitations,. She did not report to the ER yesterday for blood transfusion as advised for Hgb 6.4 gm/dL.  1/25/24 Patient is here to follow up for MDS/MPN, accompanied by her caregiver. She feels fatigue which is unchanged from her baseline, denies shortness of breath, chest pain, bleeding of palpitations, Her last blood transfusion was on 1/4/24 int he ER. She had seen the nephrologist and was recommended further lab test.  2/15/24 Patient is here to follow up for MDS/MPN, accompanied by her caregiver. She feels fatigue which is unchanged from her baseline, denies shortness of breath, chest pain, bleeding or palpitations, Her last blood transfusion was on 2/1/24 in the ER.  3/7/24 Patient is here to follow up for MDS/MPN, accompanied by her caregiver. She feels fatigue which is unchanged from her baseline, denies shortness of breath, chest pain, bleeding or palpitations. She gets home labs, last Hgb 6.4 gm/dL on 3/6/24.

## 2024-03-07 NOTE — ASSESSMENT
[FreeTextEntry1] : Patient is a 79 year old female with Lowrisk myelodysplastic syndrome, previously treated with Revlimid and Procrit and Vidaza. 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. Patient is s/p 9 cycles of Vidaza and she was requiring pRBC transfusion every 3 weeks. Given the persistent transfusion dependence she was switched to Luspatercept.  DETECTED GENOMIC ALTERATIONS: Tier III: Variants of Unknown Clinical Significance [id number]  Results of BM biopsy (3/30/22) showed No e/o increased blasts. Pt is needing PRBCs almost every 3 weeks. Advised to follow up with Dr Ferrell to consider any other option or clinical trial. Had Called Dr Ferrell's office and discussed case with her. No clinical trial available, Rec PRBCS.  S/P port placement on 3/1/23.  PLAN: Previous notes reviewed including all relevant laboratory results and were communicated to the patient.  -- Continue Luspatercept 1.75 mg/kg every 3 weeks, due today. CBC on 3/6/24 ([facility]) reviewed; moderate anemia noted with Hgb 6.4 gm/dL. Will transfuse 1 unit pRBC today. SE discussed including: hypertension, abdominal pain, diarrhea, nausea, increased serum alanine aminotransferase, increased serum aspartate aminotransferase, increased serum bilirubin, dizziness, fatigue, headache, vertigo, arthralgia, musculoskeletal pain, ostealgia, decreased creatinine clearance, cough, dyspnea.  The therapy dose was adjusted according to pt's labs and anticipated tolerance. The high risk of complications and complexity associated with this therapy administration has been explained to the patient and her daughter and they were both agreeable. Treatment will be administered under my direct supervision.  -- Hold Retacrit for now. -- Monitor CBC weekly and administer 1 pRBC unit as needed when Hgb < 7 gm/dL. Pt has e/o iron overload, so transfusions need to be kept at a minimum if possible. She is maintaining her requirement of PRBCs almost Q 3-4 weeks. -- Continue to follow up with PCP and endo as recommended.  # Vitamin B 12 deficiency -- Continue Vitamin B 12 injection every 6 weeks, last given on 2/15/24.  # Thrombocytosis (controlled) -- Previously on Hydrea. -- Platelet (3/6/24, [facility]) reviewed 474 K/uL. -- Will continue to monitor.  #Chronic kidney disease -- Follow up with renal as recommended.  RTC in 3 weeks.  Case was seen and discussed with Dr. Wade who agreed with assessment and plan.

## 2024-03-08 DIAGNOSIS — D46.9 MYELODYSPLASTIC SYNDROME, UNSPECIFIED: ICD-10-CM

## 2024-03-08 DIAGNOSIS — C46.9 KAPOSI'S SARCOMA, UNSPECIFIED: ICD-10-CM

## 2024-03-14 NOTE — ASSESSMENT
[FreeTextEntry1] : Lowrisk myelodysplastic syndrome, on Revlimid and Procrit. \par Pt was also diagnosed with B12 deficiency.\par \par PLAN:\par continue procrit 87654 units weekly.\par Pt advised to remain compliant with procrit,\par On  Revlimid 5 mg daily 2 weeks on 1 week off.  Encouraged her to take  Imodium and also Lomotil if diarrhea is not controlled. \par Her compliance and any side effects from such treatment were assessed at today's visit\par Side effects of  Revlimid and their management were discussed\par \par Continue B12 injections monthly. \par RTC in 3 weeks.\par \par \par 
DISPLAY PLAN FREE TEXT

## 2024-03-15 ENCOUNTER — APPOINTMENT (OUTPATIENT)
Dept: INFUSION THERAPY | Facility: CLINIC | Age: 79
End: 2024-03-15

## 2024-03-21 ENCOUNTER — EMERGENCY (EMERGENCY)
Facility: HOSPITAL | Age: 79
LOS: 0 days | Discharge: HOME | End: 2024-03-21
Attending: EMERGENCY MEDICINE
Payer: MEDICARE

## 2024-03-21 VITALS
SYSTOLIC BLOOD PRESSURE: 137 MMHG | OXYGEN SATURATION: 100 % | RESPIRATION RATE: 18 BRPM | HEART RATE: 94 BPM | HEIGHT: 62 IN | DIASTOLIC BLOOD PRESSURE: 61 MMHG | TEMPERATURE: 98 F

## 2024-03-21 VITALS
HEART RATE: 70 BPM | SYSTOLIC BLOOD PRESSURE: 124 MMHG | OXYGEN SATURATION: 99 % | RESPIRATION RATE: 16 BRPM | DIASTOLIC BLOOD PRESSURE: 70 MMHG | TEMPERATURE: 98 F

## 2024-03-21 DIAGNOSIS — Z98.890 OTHER SPECIFIED POSTPROCEDURAL STATES: Chronic | ICD-10-CM

## 2024-03-21 DIAGNOSIS — D46.9 MYELODYSPLASTIC SYNDROME, UNSPECIFIED: ICD-10-CM

## 2024-03-21 DIAGNOSIS — D63.1 ANEMIA IN CHRONIC KIDNEY DISEASE: ICD-10-CM

## 2024-03-21 DIAGNOSIS — N18.9 CHRONIC KIDNEY DISEASE, UNSPECIFIED: ICD-10-CM

## 2024-03-21 DIAGNOSIS — E11.22 TYPE 2 DIABETES MELLITUS WITH DIABETIC CHRONIC KIDNEY DISEASE: ICD-10-CM

## 2024-03-21 DIAGNOSIS — D64.9 ANEMIA, UNSPECIFIED: ICD-10-CM

## 2024-03-21 LAB
ANION GAP SERPL CALC-SCNC: 12 MMOL/L — SIGNIFICANT CHANGE UP (ref 7–14)
ANISOCYTOSIS BLD QL: SLIGHT — SIGNIFICANT CHANGE UP
BASOPHILS # BLD AUTO: 0 K/UL — SIGNIFICANT CHANGE UP (ref 0–0.2)
BASOPHILS NFR BLD AUTO: 0 % — SIGNIFICANT CHANGE UP (ref 0–1)
BLD GP AB SCN SERPL QL: SIGNIFICANT CHANGE UP
BUN SERPL-MCNC: 44 MG/DL — HIGH (ref 10–20)
CALCIUM SERPL-MCNC: 9.4 MG/DL — SIGNIFICANT CHANGE UP (ref 8.4–10.5)
CHLORIDE SERPL-SCNC: 100 MMOL/L — SIGNIFICANT CHANGE UP (ref 98–110)
CO2 SERPL-SCNC: 21 MMOL/L — SIGNIFICANT CHANGE UP (ref 17–32)
CREAT SERPL-MCNC: 1.5 MG/DL — SIGNIFICANT CHANGE UP (ref 0.7–1.5)
DACRYOCYTES BLD QL SMEAR: SLIGHT — SIGNIFICANT CHANGE UP
EGFR: 35 ML/MIN/1.73M2 — LOW
EOSINOPHIL # BLD AUTO: 0.06 K/UL — SIGNIFICANT CHANGE UP (ref 0–0.7)
EOSINOPHIL NFR BLD AUTO: 0.9 % — SIGNIFICANT CHANGE UP (ref 0–8)
GIANT PLATELETS BLD QL SMEAR: PRESENT — SIGNIFICANT CHANGE UP
GLUCOSE SERPL-MCNC: 322 MG/DL — HIGH (ref 70–99)
HCT VFR BLD CALC: 20 % — LOW (ref 37–47)
HGB BLD-MCNC: 6.6 G/DL — CRITICAL LOW (ref 12–16)
LYMPHOCYTES # BLD AUTO: 0.81 K/UL — LOW (ref 1.2–3.4)
LYMPHOCYTES # BLD AUTO: 11.4 % — LOW (ref 20.5–51.1)
MANUAL SMEAR VERIFICATION: SIGNIFICANT CHANGE UP
MCHC RBC-ENTMCNC: 30.3 PG — SIGNIFICANT CHANGE UP (ref 27–31)
MCHC RBC-ENTMCNC: 33 G/DL — SIGNIFICANT CHANGE UP (ref 32–37)
MCV RBC AUTO: 91.7 FL — SIGNIFICANT CHANGE UP (ref 81–99)
MICROCYTES BLD QL: SLIGHT — SIGNIFICANT CHANGE UP
MONOCYTES # BLD AUTO: 0.31 K/UL — SIGNIFICANT CHANGE UP (ref 0.1–0.6)
MONOCYTES NFR BLD AUTO: 4.4 % — SIGNIFICANT CHANGE UP (ref 1.7–9.3)
NEUTROPHILS # BLD AUTO: 5.83 K/UL — SIGNIFICANT CHANGE UP (ref 1.4–6.5)
NEUTROPHILS NFR BLD AUTO: 82.4 % — HIGH (ref 42.2–75.2)
OVALOCYTES BLD QL SMEAR: SLIGHT — SIGNIFICANT CHANGE UP
PLAT MORPH BLD: ABNORMAL
PLATELET # BLD AUTO: 518 K/UL — HIGH (ref 130–400)
PMV BLD: 10 FL — SIGNIFICANT CHANGE UP (ref 7.4–10.4)
POIKILOCYTOSIS BLD QL AUTO: SLIGHT — SIGNIFICANT CHANGE UP
POLYCHROMASIA BLD QL SMEAR: SLIGHT — SIGNIFICANT CHANGE UP
POTASSIUM SERPL-MCNC: 6.1 MMOL/L — CRITICAL HIGH (ref 3.5–5)
POTASSIUM SERPL-SCNC: 6.1 MMOL/L — CRITICAL HIGH (ref 3.5–5)
RBC # BLD: 2.18 M/UL — LOW (ref 4.2–5.4)
RBC # FLD: 22.1 % — HIGH (ref 11.5–14.5)
RBC BLD AUTO: ABNORMAL
SCHISTOCYTES BLD QL AUTO: SLIGHT — SIGNIFICANT CHANGE UP
SODIUM SERPL-SCNC: 133 MMOL/L — LOW (ref 135–146)
VARIANT LYMPHS # BLD: 0.9 % — SIGNIFICANT CHANGE UP (ref 0–5)
WBC # BLD: 7.07 K/UL — SIGNIFICANT CHANGE UP (ref 4.8–10.8)
WBC # FLD AUTO: 7.07 K/UL — SIGNIFICANT CHANGE UP (ref 4.8–10.8)

## 2024-03-21 PROCEDURE — 99223 1ST HOSP IP/OBS HIGH 75: CPT

## 2024-03-21 PROCEDURE — 86850 RBC ANTIBODY SCREEN: CPT

## 2024-03-21 PROCEDURE — 80048 BASIC METABOLIC PNL TOTAL CA: CPT

## 2024-03-21 PROCEDURE — 99285 EMERGENCY DEPT VISIT HI MDM: CPT

## 2024-03-21 PROCEDURE — 86923 COMPATIBILITY TEST ELECTRIC: CPT

## 2024-03-21 PROCEDURE — G0378: CPT

## 2024-03-21 PROCEDURE — 86901 BLOOD TYPING SEROLOGIC RH(D): CPT

## 2024-03-21 PROCEDURE — 36430 TRANSFUSION BLD/BLD COMPNT: CPT

## 2024-03-21 PROCEDURE — 85025 COMPLETE CBC W/AUTO DIFF WBC: CPT

## 2024-03-21 PROCEDURE — 36415 COLL VENOUS BLD VENIPUNCTURE: CPT

## 2024-03-21 PROCEDURE — P9040: CPT

## 2024-03-21 PROCEDURE — 86900 BLOOD TYPING SEROLOGIC ABO: CPT

## 2024-03-21 NOTE — ED PROVIDER NOTE - PHYSICAL EXAMINATION
CONST: Well appearing in NAD  EYES: PERRL, EOMI, Sclera and conjunctiva pale  ENT:  Oropharynx normal appearing, no erythema or exudates. Uvula midline.  NECK: Non-tender, no meningeal signs  CARD: Normal S1 S2; Normal rate and rhythm  RESP: Equal BS B/L, No wheezes, rhonchi or rales. No distress  GI: Soft, non-tender, non-distended.  MS: Normal ROM in all extremities. No midline spinal tenderness.  SKIN: Warm, dry, no acute rashes. Good turgor  NEURO: A&Ox3, No focal deficits. Strength 5/5 with no sensory deficits.

## 2024-03-21 NOTE — ED CDU PROVIDER INITIAL DAY NOTE - CLINICAL SUMMARY MEDICAL DECISION MAKING FREE TEXT BOX
Patient with history of MDS, found to be anemic outpatient, sent in for blood transfusion. Hb in ED 6.6 and therefore placed in obs for transfusion of 2U PRBCs. Patient NAD at this time, consented by original ED team. Will re-eval pending transfusions.

## 2024-03-21 NOTE — ED CDU PROVIDER DISPOSITION NOTE - CARE PROVIDER_API CALL
Isatu Mendez)  Hematology  [address] [place] [id number]  Phone: [phone]  Fax: [phone]  Follow Up Time: 1-3 Days

## 2024-03-21 NOTE — ED PROVIDER NOTE - OBJECTIVE STATEMENT
Patient with history of MDS, chronic kidney disease, diabetes presents with anemia.  Visiting nurse joanna blood yesterday and was called today and told that she needed a blood transfusion.  Patient has a history of requiring transfusions every 2 or 3 weeks.  Patient has no symptoms currently.  Denies pain, shortness of breath, chest pain, dizziness, weakness

## 2024-03-21 NOTE — ED CDU PROVIDER DISPOSITION NOTE - PATIENT PORTAL LINK FT
You can access the FollowMyHealth Patient Portal offered by [facility] by registering at the following website: [email or web address]. By joining [employer]’s FollowMyHealth portal, you will also be able to view your health information using other applications (apps) compatible with our system.

## 2024-03-21 NOTE — ED ADULT TRIAGE NOTE - BP NONINVASIVE SYSTOLIC (MM HG)
Patient Education        High Cholesterol: Care Instructions  Your Care Instructions    Cholesterol is a type of fat in your blood. It is needed for many body functions, such as making new cells. Cholesterol is made by your body. It also comes from food you eat. High cholesterol means that you have too much of the fat in your blood. This raises your risk of a heart attack and stroke. LDL and HDL are part of your total cholesterol. LDL is the \"bad\" cholesterol. High LDL can raise your risk for heart disease, heart attack, and stroke. HDL is the \"good\" cholesterol. It helps clear bad cholesterol from the body. High HDL is linked with a lower risk of heart disease, heart attack, and stroke. Your cholesterol levels help your doctor find out your risk for having a heart attack or stroke. You and your doctor can talk about whether you need to lower your risk and what treatment is best for you. A heart-healthy lifestyle along with medicines can help lower your cholesterol and your risk. The way you choose to lower your risk will depend on how high your risk is for heart attack and stroke. It will also depend on how you feel about taking medicines. Follow-up care is a key part of your treatment and safety. Be sure to make and go to all appointments, and call your doctor if you are having problems. It's also a good idea to know your test results and keep a list of the medicines you take. How can you care for yourself at home? · Eat a variety of foods every day. Good choices include fruits, vegetables, whole grains (like oatmeal), dried beans and peas, nuts and seeds, soy products (like tofu), and fat-free or low-fat dairy products. · Replace butter, margarine, and hydrogenated or partially hydrogenated oils with olive and canola oils. (Canola oil margarine without trans fat is fine.)  · Replace red meat with fish, poultry, and soy protein (like tofu).   · Limit processed and packaged foods like chips, crackers, and cookies. · Bake, broil, or steam foods. Don't bauer them. · Be physically active. Get at least 30 minutes of exercise on most days of the week. Walking is a good choice. You also may want to do other activities, such as running, swimming, cycling, or playing tennis or team sports. · Stay at a healthy weight or lose weight by making the changes in eating and physical activity listed above. Losing just a small amount of weight, even 5 to 10 pounds, can reduce your risk for having a heart attack or stroke. · Do not smoke. When should you call for help? Watch closely for changes in your health, and be sure to contact your doctor if:  ? · You need help making lifestyle changes. ? · You have questions about your medicine. Where can you learn more? Go to https://Elevate Digitaljhoneb.ShuttleCloud. org and sign in to your [employer] account. Enter M163 in the Headroom box to learn more about \"High Cholesterol: Care Instructions. \"     If you do not have an account, please click on the \"Sign Up Now\" link. Current as of: September 21, 2016  Content Version: 11.4  © 5096-1262 [employer]. Care instructions adapted under license by [facility] ([facility]). If you have questions about a medical condition or this instruction, always ask your healthcare professional. [address] any warranty or liability for your use of this information. Patient Education        Learning About High Cholesterol  What is high cholesterol? Cholesterol is a type of fat in your blood. It is needed for many body functions, such as making new cells. Cholesterol is made by your body. It also comes from food you eat. If you have too much cholesterol, it starts to build up in your arteries. This is called hardening of the arteries, or atherosclerosis. High cholesterol raises your risk of a heart attack and stroke. There are different types of cholesterol. LDL is the \"bad\" cholesterol.  High LDL can raise your risk for heart disease, heart attack, and stroke. HDL is the \"good\" cholesterol. High HDL is linked with a lower risk for heart disease, heart attack, and stroke. Your cholesterol levels help your doctor find out your risk for having a heart attack or stroke. How can you prevent high cholesterol? A heart-healthy lifestyle can help you prevent high cholesterol. This lifestyle helps lower your risk for a heart attack and stroke. · Eat heart-healthy foods. ¨ Eat fruits, vegetables, whole grains (like oatmeal), dried beans and peas, nuts and seeds, soy products (like tofu), and fat-free or low-fat dairy products. ¨ Replace butter, margarine, and hydrogenated or partially hydrogenated oils with olive and canola oils. (Canola oil margarine without trans fat is fine.)  ¨ Replace red meat with fish, poultry, and soy protein (like tofu). ¨ Limit processed and packaged foods like chips, crackers, and cookies. · Be active. Exercise can improve your cholesterol level. Get at least 30 minutes of exercise on most days of the week. Walking is a good choice. You also may want to do other activities, such as running, swimming, cycling, or playing tennis or team sports. · Stay at a healthy weight. Lose weight if you need to. · Don't smoke. If you need help quitting, talk to your doctor about stop-smoking programs and medicines. These can increase your chances of quitting for good. How is high cholesterol treated? The goal of treatment is to reduce your chances of having a heart attack or stroke. The goal is not to lower your cholesterol numbers only. · You may make lifestyle changes, such as eating healthy foods, not smoking, losing weight, and being more active. · You may have to take medicine. Follow-up care is a key part of your treatment and safety. Be sure to make and go to all appointments, and call your doctor if you are having problems.  It's also a good idea to know your test results and keep a list of the medicines you take. Where can you learn more? Go to https://chpepiceweb.[email or web address]. org and sign in to your [employer] account. Enter O131 in the [facility] box to learn more about \"Learning About High Cholesterol. \"     If you do not have an account, please click on the \"Sign Up Now\" link. Current as of: September 21, 2016  Content Version: 11.4  © 3275-4710 [employer]. Care instructions adapted under license by [facility] ([facility]). If you have questions about a medical condition or this instruction, always ask your healthcare professional. [address] any warranty or liability for your use of this information. 137

## 2024-03-21 NOTE — ED PROVIDER NOTE - ATTENDING APP SHARED VISIT CONTRIBUTION OF CARE
79-year-old female to ED with low hemoglobin.  Has a history of MDS noted to have outpatient blood work with anemia and came in today.  No fevers no sick contacts or travels or trauma.  Otherwise well-appearing nontoxic no other complaints.  Says she feels weak and tired as she usually does

## 2024-03-21 NOTE — ED CDU PROVIDER INITIAL DAY NOTE - PHYSICAL EXAMINATION
Vital Signs: I have reviewed the initial vital signs.  Constitutional: well-nourished, appears stated age, no acute distress  Cardiovascular: regular rate, regular rhythm,   Respiratory: unlabored respiratory effort,

## 2024-03-21 NOTE — ED PROVIDER NOTE - IV ALTEPLASE EXCL REL HIDDEN
Okay to send for rsv testing.  She has to go to hospital to have it done.  If she is concerned, we can get her in to listen to her.  michele   show

## 2024-03-21 NOTE — ED CDU PROVIDER DISPOSITION NOTE - ATTENDING CONTRIBUTION TO CARE
79-year-old female PMH MDS, CKD, DM, anemia requiring frequent blood transfusion presented for low H&H.  Patient was transfused 2 units of PRBCs, tolerated it well, labs were reviewed.  Stable for DC home.  Strict return precautions given.

## 2024-03-28 ENCOUNTER — LABORATORY RESULT (OUTPATIENT)
Age: 79
End: 2024-03-28

## 2024-03-28 ENCOUNTER — APPOINTMENT (OUTPATIENT)
Age: 79
End: 2024-03-28
Payer: MEDICARE

## 2024-03-28 ENCOUNTER — OUTPATIENT (OUTPATIENT)
Dept: OUTPATIENT SERVICES | Facility: HOSPITAL | Age: 79
LOS: 1 days | End: 2024-03-28
Payer: MEDICARE

## 2024-03-28 VITALS — HEART RATE: 87 BPM | TEMPERATURE: 98 F | DIASTOLIC BLOOD PRESSURE: 90 MMHG | SYSTOLIC BLOOD PRESSURE: 139 MMHG

## 2024-03-28 VITALS
HEIGHT: 62 IN | DIASTOLIC BLOOD PRESSURE: 90 MMHG | TEMPERATURE: 98.2 F | HEART RATE: 87 BPM | RESPIRATION RATE: 16 BRPM | OXYGEN SATURATION: 100 % | SYSTOLIC BLOOD PRESSURE: 139 MMHG | WEIGHT: 139 LBS | BODY MASS INDEX: 25.58 KG/M2

## 2024-03-28 DIAGNOSIS — Z51.11 ENCOUNTER FOR ANTINEOPLASTIC CHEMOTHERAPY: ICD-10-CM

## 2024-03-28 DIAGNOSIS — Z98.890 OTHER SPECIFIED POSTPROCEDURAL STATES: Chronic | ICD-10-CM

## 2024-03-28 DIAGNOSIS — R10.9 UNSPECIFIED ABDOMINAL PAIN: ICD-10-CM

## 2024-03-28 DIAGNOSIS — D46.9 MYELODYSPLASTIC SYNDROME, UNSPECIFIED: ICD-10-CM

## 2024-03-28 LAB
HCT VFR BLD CALC: 27.8 %
HGB BLD-MCNC: 9 G/DL
MCHC RBC-ENTMCNC: 30.3 PG
MCHC RBC-ENTMCNC: 32.4 G/DL
MCV RBC AUTO: 93.6 FL
PLATELET # BLD AUTO: 280 K/UL
PMV BLD: 9.4 FL
RBC # BLD: 2.97 M/UL
RBC # FLD: 18.4 %
WBC # FLD AUTO: 7.51 K/UL

## 2024-03-28 PROCEDURE — 99215 OFFICE O/P EST HI 40 MIN: CPT

## 2024-03-28 PROCEDURE — 85027 COMPLETE CBC AUTOMATED: CPT

## 2024-03-28 PROCEDURE — 96401 CHEMO ANTI-NEOPL SQ/IM: CPT

## 2024-03-28 PROCEDURE — G2211 COMPLEX E/M VISIT ADD ON: CPT

## 2024-03-28 PROCEDURE — 80053 COMPREHEN METABOLIC PANEL: CPT

## 2024-03-28 RX ORDER — LUSPATERCEPT 75 MG/1
110 INJECTION, POWDER, LYOPHILIZED, FOR SOLUTION SUBCUTANEOUS ONCE
Refills: 0 | Status: COMPLETED | OUTPATIENT
Start: 2024-03-28 | End: 2024-03-28

## 2024-03-28 RX ADMIN — LUSPATERCEPT 110 MILLIGRAM(S): 75 INJECTION, POWDER, LYOPHILIZED, FOR SOLUTION SUBCUTANEOUS at 14:10

## 2024-03-29 ENCOUNTER — APPOINTMENT (OUTPATIENT)
Dept: NEPHROLOGY | Facility: CLINIC | Age: 79
End: 2024-03-29
Payer: MEDICARE

## 2024-03-29 VITALS
HEART RATE: 79 BPM | SYSTOLIC BLOOD PRESSURE: 144 MMHG | OXYGEN SATURATION: 93 % | BODY MASS INDEX: 24.84 KG/M2 | HEIGHT: 62 IN | DIASTOLIC BLOOD PRESSURE: 86 MMHG | WEIGHT: 135 LBS | TEMPERATURE: 98.7 F

## 2024-03-29 DIAGNOSIS — E87.5 HYPERKALEMIA: ICD-10-CM

## 2024-03-29 LAB
ALBUMIN SERPL ELPH-MCNC: 4.7 G/DL
ALP BLD-CCNC: 128 U/L
ALT SERPL-CCNC: 12 U/L
ANION GAP SERPL CALC-SCNC: 12 MMOL/L
AST SERPL-CCNC: 12 U/L
BILIRUB SERPL-MCNC: 0.5 MG/DL
BUN SERPL-MCNC: 42 MG/DL
CALCIUM SERPL-MCNC: 9.1 MG/DL
CHLORIDE SERPL-SCNC: 107 MMOL/L
CO2 SERPL-SCNC: 21 MMOL/L
CREAT SERPL-MCNC: 1.5 MG/DL
EGFR: 35 ML/MIN/1.73M2
GLUCOSE SERPL-MCNC: 194 MG/DL
POTASSIUM SERPL-SCNC: 6 MMOL/L
PROT SERPL-MCNC: 6.6 G/DL
SODIUM SERPL-SCNC: 140 MMOL/L

## 2024-03-29 PROCEDURE — 99214 OFFICE O/P EST MOD 30 MIN: CPT

## 2024-03-29 RX ORDER — SODIUM POLYSTYRENE SULFONATE 15 G/60ML
15 SUSPENSION ORAL; RECTAL DAILY
Qty: 210 | Refills: 0 | Status: ACTIVE | COMMUNITY
Start: 2024-03-29 | End: 1900-01-01

## 2024-03-29 NOTE — HISTORY OF PRESENT ILLNESS
[FreeTextEntry1] : Sophia Kennedy is a 79 yo F with history of NIDDM, HTN, myelodysplastic syndrome (on Revlimid), anemia (on procrit injections with HemOnc, more recently requiring frequent pRBC infusions for severe anemia), prior episodes of MARCO, seen in follow-up today for CKD.    Last Hgb 9.0 gm/dL on 3/28/24, follows with HemOnc for Procrit injections.  Last K was 6.0 on labwork performed yesterday.  NSAIDS use: denies use  Home BP: no abnormal readings noted  Home FSBS:  no abnormal readings noted  LUTS: denies LUTS, foamy urine or hematuria  Uremic Symptoms: no pruritis, metallic taste, new onset weakness, dysphagia, poor appetite, or tremors noted  FamHx CKD/RRT:  denies  Personal History of CKD/RRT:  denies

## 2024-03-29 NOTE — PHYSICAL EXAM
[Ambulatory and capable of all self care but unable to carry out any work activities] : Status 2- Ambulatory and capable of all self care but unable to carry out any work activities. Up and about more than 50% of waking hours [Normal] : affect appropriate [de-identified] : Lower extremity mild edema [de-identified] : Right chest port-A-cath intact, no erythema or tenderness noted.

## 2024-03-29 NOTE — HISTORY OF PRESENT ILLNESS
[de-identified] : This is a 75 year old  female with history of MDS. She's was previously treated with Revlimid 5 mg, 2 weeks on, 1 week off.  \par  She is also on Procrit.\par   She underwent colonoscopy (2/2017)  Results noted no colitis, only hyperplastic polyp noted. \par   \par   [de-identified] : She missed on appointment for procrit last week. She starts her next cycle on 6/25/18 C/o back pain radiating down her left which occurred when she bent to  something. No change in diarrhea.  7/31/18: Doing well. On Revlimid for more than 2yrs, 5 mg 2 weeks on 1 week off. She will be starting week on tonight.  C/o diarrhea. On Imodium 2 pills AM and 2 pills PM. Doesn't help much with diarrhea.  C/o nausea, abdominal pain.    Colonoscopy in 2016 was normal.  Did not have blood transfusion for over 2 years.  On procrit [id number] weekly. Missed last week on 7/24/18 as she was out of town. She has been non compliant with weekly Procrit. Mammogram in 2017 was normal.   9/11/18 pt is here for follow up. She feels the lomotil helps with the diarrhea. She was away for a few weeks and missed her procrit injections. No fever, abdominal  discomfort has been less since since use of lomotil. She started a new cycle 2 days ago.  10/2/18: She will start Revlimid tonight (week on). 2 weeks on, 1 week off.  On ASA 81mg.  Denies fever, nausea, vomiting, chest pain, SOB, abdominal pain, and bladder problems. C/o diarrhea.  S/p 2 units PRBC transfusion on 9/25/18.  On Procrit 94044 units weekly. Not compliant every week.  C/o intermittent dizziness.   10/31/18 Pt is here for follow up. C/O significant fatigue. Continues to have diarrhea, takes imodium and lomotil as needed. C/o dry cough, no fever. Cough started a month ago. It is worse in the mornings however over last week it has been constant all day. No chest pain. She was found to have low B12 levels and was started on B12 injections.  11/27/18: Doing well. Hospitalized for 3 days for cellulitis/abcess of the back s/p drainage and on Abx currently. Developed 3 days after Mg infusion as per pt.  Denies nausea, vomiting, chest pain, SOB, abdominal pain, bowel and bladder problems. This is the week on Revlimid (week 1). S/p 1 unit PRBC transfusion in the hospital.  On Procrit weekly   12/27/18: Doing well. No major complaints. Denies fever, nausea, vomiting, chest pain, SOB, abdominal pain, and bladder problems. On Revlimid 5 mg 2 weeks on 1 week off. This is the week off. She will start the week on sunday (12/30/18). Due for Procrit 18456 units today.  Still has diarrhea. 4-5bm/day. On monthly B12 injections.   1/30/19: Patient here for follow up for MDS.  She is taking Revlimid 5 mg, 2 weeks on, 1 week off.  She will complete this current cycle on Saturday.  She has no new complaints today.  Patient denies cough, shortness of breath, denies fever, denies bone pain.  03/04/2019 Patient is here for a follow up visit. She complaints of severe pain in her left shoulder and has had abscess drained 2 weeks ago. However the pain is worse and she has purulent discharge on examination. She also has Nasal sores that are painful. Culture from 11/2018 showed MRSA.  Denies Fever.  She also complaints of swelling in her right leg. Not tender and not erythematous and negative Gong;s sign.  Her diarrhea with Revlimid is ongoing and Imodium and Lomotil helps but not everyday.  She is on 60,000 units of procrit weekly and Revlimid 5 mg 2 weeks on 1 week off.   4/23/19 Pt is here for follow up. She feels well. The nasal sores have improved with bactroban The abscess on left shoulder has resolved. However she has a new one on the middle of her back. No fever. Pt has been on procrit 79851 units weekly, however she missed a dose in between.  5/8/19 pt is here for follow up. no new complaints. feels well. Her skin abscess has resolved. she has been unable to go to ID, as she could not get an appt. No bleeding. She is compliant with revlimid.  6/6/19 Pt is here for follow up. no new complaints  Feels well. Is on week 2 of her Revlimid cycle.  No transfusions since last visit  7/17/19 Pt is here for follow up. C/O fatigue. Was away for a few days two weeks ago, but missed treatment with procrit for 2 weeks. Is complaint with Revlimid 2 weeks on 1 week off. Diarrhea is a little improved.  8/14/19 Patient here for follow up visit, feeling well.  Although she is complaining of some pain at the left scapula area recently.  Patient denies cough, shortness of breath, denies fever, denies other bone pain.  She is off Revlimid  this week, due to restart on Monday.  She is scheduled for Procrit and Vitamin B12 injection today.  She plans to leave the country tomorrow to visit her mother who is ill.  She will return in about 10 days.  9/11/19 Pt is here for follow up. She was away for 3 weeks, out of which she took procrit for 2 weeks in [place]. She missed last week's dose. She had CBCs in [place] which showed Hgb of 8.9 She feels well. She still getting diarrhea. She has been using lomotil with very minimal relief. She started a new cycle of Revlimid 4 days ago.  10/3/19 Patient here for follow up visit, feeling fairly well.   Patient denies cough, shortness of breath, denies fever, denies other bone pain.  She remains on Revlimid.  She is scheduled for Procrit and Vitamin B12 injection today.  10/24/19 Pt is here for follow up. Feels well. No SOB, fatigue. Compliant with procrit and Revl;imid. Remains on ASa. Diarrhea is unchanged. Pt takes imodium as needed.  11/14/19 Pt is here for follow up. No new complaints. Starts a new cycle of Revlimid today. Diarrhea is same as before, no rectal bleeding. No fever.  12/5/19 Pt is here for follow up. No new complaints. Denies feeling weaker than usual. No fever, SOB. No change in frequency of diarrhea. No pain. Will start next cycle of Revlimid in 3 days.   !/16/20 Pt was recently DCed from [facility] 3 days ago after being treated for pneumonia and MARCO. She is on po Levaquin. She restarted Revlmid 3 days ago. She is feeling better now. No fever. No SOB, Chest pain and back pain has resolved. Pt has a cough, no expectoration. She also recd a unit of PRBCs last week in the hospital   1/30/20 Patient here for follow up visit, feeling well.  She has no new complaints. Cough is almost gone completely.  Patient denies shortness of breath, denies fever, denies bone pain.  Her appetite is ok, weight is stable.  She is due to restart Revlimid on Monday.  02/20/20 Pt is here for follow up, feeling well. Offers no new complaints. Denies SOB, fever, chills, weight loss, CP, cough.  Currently off week of Revlimid 5 mg. Restarts on Monday. Has procrit 80,000 units today. Next b 12 injection due in 2 weeks  Did not get chest Xray CBC reviewed WBC 3.1, h/h 8.3/25%, plt 386, neutrophils 1.29  3/12/20 Pt is here for follow up. She took Revlimid for 2 weeks and then has been off for one week although she was advised to take it daily without break. No SOB, Cough, fever. Has mild fatigue.  04/02/2020 SIMONA KUHN a 75 year F is here today for follow up visit of MDS. Denies fever, chills, cough,night sweats, weight loss.  Denies bleeding or bruising. Pt receives procrit 80,000 units weekly and B12 IM monthly.  Continued Revlimid 5 mg daily x 3 weeks, has 1 dose left tonight.  CBC reviewed: WBC 3.2, H/H 8.1/25.2, neutrophils 1.6, PLT 72  4/20/2020: The patient is here for follow up . She has no complaints of fever, chills, dizziness , chest pain and shortness of breath . She is still with diarrhea , up to 10 watery BMs per day, responds poorly to imodium and lomotil. She is on Revlimid 5 mg daily , took last dose yesterday night. Her last Procrit was on April 13.   5/26/20 Pt is here for follow up. She is feeling well. Denies SOB, chest pain, fever. Continues to have diarrhea although she is off of Revlmid. Thinks it may due to diabetic meds. Wants to discuss BM bx results  6/22/20 Pt is here for follow up. Feels well. Denies any fever, N, V, D Continues to have diarrhea, she will talk to her PCP about changing metformin for DM. Has been needing PRBCs 1 unit each month  7/20/20 Pt is here for follow up. No new complaints. Has been feeling well. No SOB, CP.  No N, V, D. She has recd 2 units of PRBCs since May 20.  8/17/20 Pt is here for a follow up visit. She is feeling well. No new symptoms. No N, V, D. No bruising or bleeding. She continues to need PRBCs every 2-3 weeks.  8/31/20 Pt is here for follow up. She is feeling well. No N, V, D. She is tolerating the hydrea well. No SOB, CP No bruising ior bleeding  9/8/20 Pt is here for follow up. She had been contacted by the [facility] last week to advise her to stop the hydrea. Pt did not check her messages and continued with Hydrea. No fever, N, V, bleeding. Saw Dr Ferrell last week.  9/14/20 Pt is here for folow up. Besides her usual complaint of diarrhea she is feeling well. Recd 1 unit PRBC last week. Has stopped Hydrea. No fever, mouth sores.  9/29/20 Pt is here for folow up. No new complaints. Is seeing GI for diarrhea net week. No fever, night sweats. REcd 3 units of PRBC this month  10/13/20 Pt is here for follow up. No new complaints. Has not needed a transfusion since 3 weeks ago. Continues to have diarrhea, waiting to be seen by GI  10/26/20 Pt is here for follow up. C/O feeling fatigued. Has CLARK. No nausea or vomiting. No fever. Diarrhea has improved a little. She is no longer on Metformin  11/9/20 Pt is here for follow up. Feels well. Denies SOB, CP, fatigue  12/7/20- Patient is for follow up and is due for cycle 9 of Vidaza.  She still has loose BM sometimes 10 times a day and was seeing Dr. Corey from GI- did not follow up recently and is unable to get an appt with him. She has lost about 10 lbs since September. No N/V. No fever or chills. No CP/SOB. She has been getting PRBC transfusion every 3 weeks now  01/04/20 Pt presented today for f/u visit . She is s/p 8 cycles of vidaza and was started on Luspatercept in Dec , 2020 . So far she received 1 injection , she is due for 2nd injection today . Adverse effect profile was discussed with her in detail , she reports having some dizziness and weakness after first injection . She received blood transfusion last wk . Blood work from today reviewed as well and counts are adequate . She denies any fevers,  chills,  or any new symptoms .   1/25/21 Pt is here for follow up. C/O diarrhea, otherwise feeling better. Has not needed a transfusion since 12/29 2/16/21 Pt is here for follow up. Needed transfusion on 2/8/21. Continues to C/o fatigue. Diarrhea remains the same, has not made GI appt as yet.  3/8/21 Pt is here for follow up. Feels better today. Recd 1 unit PRBCs last week. Diarrhea is same as before. has an appt with GI next week. No fever  3/30/21 Pt is here for follow up. Feels better. Last transfusion was on 3/2/21 Saw GI and was started on cholestyramine and metformin was DCED with resolution of diarrhea. She denies any SOB, Fatigue is better  4/20/2021 Pt is here to f/u for MDS She is s/p Luspatercept cycle #6 She is compliant with Aspirin She feels better today, appetite is good She denies shortness of breath, fatigue, or weakness  She c/o of diarrhea but it has improved since she was started on Cholestyramine. Stool is soft and less in frequency She received COVID vaccine x 2 doses  5/11/21 pt is here for follow up. Feels the same with fatigue, diarrhea. No SOB  6/21/21 Pt is here for follow up. Feels well. No SOB, CP, N< V. Diarrhea is better controlled now. Last PRBC transfusion was 2 weeks ago.  7/19/21 Pt is here for follow up. Feels a little tired, last transfusion was 6/1/21. No bleeding, fever, SOB  8/10/2021 Patient is here to follow up for her MDS. She is on Luspatercept, tolerating well. She feels well today, the appetite is good. She denies shortness of breath, fatigue, fever, night sweats, abdominal pain, arthralgias/myalgias.  8/31/21 Pt is here for follow up. C/O feeling very fatigued. No bleeding. No fever.  9/21/2021 Patient is here to follow up for MDS. She is on Luspatercept and Retacrit, tolerating well. She gets blood transfusion as needed for Hgb <7 gm/dL She is c/o of fatigue, no shortness of breath, dizziness, chills or lightheadedness. She denies melena or hematochezia. Her appetite is good, no nausea/vomiting.  10/28/2021 Patient is here to follow up for MDS. She is on Luspatercept and Retacrit, tolerating well. She gets blood transfusion to keep Hgb > 7 gm/dL. She is c/o of chronic fatigue, no shortness of breath, dizziness, chills or lightheadedness. She denies melena or hematochezia. Her appetite is good, no nausea/vomiting. She c/o of severe diarrhea, 10-12x/day watery stool while on Imodium in the last 2 weeks. Last colonoscopy was in 2016, benign as per patient.  11/18/21 Pt is here for follow up. C/O fatigue. No SOB, CP  12/9/21 Pt is here for follow up. Feels better today. Recd 2 units last week in ER. Planning to go to [place] for over a week and does not want to miss her procrit dose. Diarrhea is stable,  12/23/21 Pt is here for follow up. Feels better. No SOB, CP. Going to [place] tomorrow. Has not been able to get Procrit injection from the pharmacy yet d/t insurance issues  2/3/22 Pt is here for follow up. Feeling same as usual. No SOB, CP. Last transfusion was 2 weeks ago in ER> No bleeding reported  3/3/22 Pt is here for follow up. C/O fatigue. Had black stools X2  3/30/22 Pt is feeling well. Here for treatment and BM biopsy  4/21/22 Pt is here fir follow up. Was in ER last week. Was give 2 units of PRBCs. Pt feels less tired today. Wants to discuss BM rslts  5/12/22 Pt is here today for follow up visit. Pt c/o feeling tired.  Hgb=6.9.  One unit transfusion scheduled.  6/23/22 Pt is here for follow up. Feels well today. No SOB, chest pain. Has not made appt with [facility]  7/14/22 Pt is here for follow up for lowrisk myelodysplastic syndrome. She came in late today because she was not feeling well this morning- dizziness & weakness. She denies SOB, CP.  9/22/22 Pt is here for follow up. C/O fatigue. was in the ER 3 weeks ago and recd 2 U PRBCS  11/10/22 Patient is here to follow up for MDS/MPN. S/P 2 units pRBC last weekend, feeling much better now. She denies shortness of breath, chest pain or headache.  1/5/2023 Patient is here to follow up for MDS/MPN, accompanied by spouse and daughter. She is feeling much better after she received blood transfusion on 12/29/22. She denies shortness of breath, chest pain, dizziness or headache. Pt fell on 12/28/22 when she slipped while getting down the stairs and landed on her right knee. Now c/o of severe right knee pain with limited ROM. She has not been evaluated since the incident happened. She is taking Aleeve with minimal relief.  1/19/2023 Patient is here to follow up for MDS/MPN and treatment. She feel fine, has fatigue not worse than her baseline. She denies shortness of breath, chest pain, dizziness, headache or bleeding. She was evaluated at the ER on 1/5/2023 for her fall and imaging showed no fracture, except for mild joint effusion.  2/9/23 Patient presents for follow up today. She complains of light-headedness. Her BP is elevated to 180/70 mmHg. She does not take any meds and says she is not diagnosed with HTN. She is due for Procrit & Luspatercept today. Reports her energy level is at baseline. No other complaints today. Her CMP showed serum glucose > 600 mg/dl, she was sent to ER with the transport.  3/2/32 Patient here for follow up for MDS. She is scheduled for next luspatercept injection. She is status post port placement yesterday, so she has some discomfort at chest area from that. She feels somewhat weak, appetite is adequate, weight is stable. She is under the care of endocrinologist to try to get her DM under better control. She was recommended to take ASA 81 mg when she was discharged from the hospital.  3/23/23 Patient here for follow up for MDS. She is scheduled for next Luspatercept injection. She is status post port placement She feels somewhat weak, appetite is adequate, weight is stable. She is under the care of endocrinologist to try to get her DM under better control. She was recommended to take ASA 81 mg when she was discharged from the hospital.  4/13/23 Pt is here for follow up. Daughter is with her. Neuro eval has not been completed yet. No bleeding, SOB, pain recd 2 Units PRBCs 2 weeks ago  5/4/23 Pt is here for follow up. Pt C/O not feeling well today. Denies pain. Has dizziness. Fasting bld sugar was over 350. She is not on insulin. Denies diarrhea, N, SOB, CP  5/25/23 Pt is here for follow up, accompanied by formal caregiver. She feels well except for fatigue which is unchanged from baseline. She denies chest pain, shortness of breath, bleeding or decreased urinary output. She states that she saw nephro last week and follows up with her PCP for her diabetes.  6/15/23 Pt is here for follow up. Feels weak, No SOB, CP  8/3/23 Pt is here for follow up Feels fatigued. Has. chronic diarrhea which has been attributed to her diabetes meds  8/30/23 Pt is here for follow C/O diarrhea and fatigue  11/7/2023 Patient is here for follow up. She is s/p 1 unit pRBC on 10/26/23. She feels fatigue, unchanged from her baseline. She denies shortness of breath, dizziness, lightheadedness or chest pain.  11/28/23 Patient is here for follow up. She continues to report sx of fatigue, unchanged from her baseline. She denies shortness of breath, dizziness, lightheadedness or chest pain. Patient endorses sensation of abdominal discomfort and general feeling of feeling unwell unable to further describe / elaborate   1/4/24 Patient is here to follow up for MDS/MPN, accompanied by spouse. She feels more fatigue and dizzy, denies shortness of breath, chest pain, bleeding of palpitations,. She did not report to the ER yesterday for blood transfusion as advised for Hgb 6.4 gm/dL.  1/25/24 Patient is here to follow up for MDS/MPN, accompanied by her caregiver. She feels fatigue which is unchanged from her baseline, denies shortness of breath, chest pain, bleeding of palpitations, Her last blood transfusion was on 1/4/24 int he ER. She had seen the nephrologist and was recommended further lab test.  2/15/24 Patient is here to follow up for MDS/MPN, accompanied by her caregiver. She feels fatigue which is unchanged from her baseline, denies shortness of breath, chest pain, bleeding or palpitations, Her last blood transfusion was on 2/1/24 in the ER.  3/7/24 Patient is here to follow up for MDS/MPN, accompanied by her caregiver. She feels fatigue which is unchanged from her baseline, denies shortness of breath, chest pain, bleeding or palpitations. She gets home labs, last Hgb 6.4 gm/dL on 3/6/24.  3.28.24 Pt is here for follow up. She was transfused 2 units last week. C/O abd pain, gaseous bloating. No rectal or vaginal bleeding. No fever.

## 2024-03-29 NOTE — ASSESSMENT
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, RAAS blocker use and from pRBC transfusions - patients daughter noted difficulty obtaining Lokelma from the pharmacy, will instead Rx Kayexalate 15mg po daily x 2 weeks, and will repeat labwork in 2 weeks time  #) Essential HTN: BP uncontrolled in clinic today - advised salt restrictive diet of <2.4gm daily - continue to monitor home BP readings and report in future clinic appointments - continue use of Nifedipine ER 90mg daily and Lisinopril 10mg daily  #) Proteinuria UPCR noted 1.7 gm/gm Cr, above goal of <0.5 gm/gm Cr - Lisinopril likely caused her hyperkalemia which necessitated hospitalization, but she requires RAAS blocker use to improve her proteinuria and hyperkalemia can be managed as arises  #) NIDDM: A1c uncontrolled on last check, 8.5% was noted - Continue current use of Tradjenta 5mg daily, Pioglitazone 30mg daily, and Glimepiride 2mg daily - continue Jardiance 10mg daily

## 2024-03-29 NOTE — PHYSICAL EXAM
[General Appearance - In No Acute Distress] : in no acute distress [General Appearance - Alert] : alert [PERRL With Normal Accommodation] : pupils were equal in size, round, and reactive to light [Sclera] : the sclera and conjunctiva were normal [Extraocular Movements] : extraocular movements were intact [Outer Ear] : the ears and nose were normal in appearance [Oropharynx] : the oropharynx was normal [Neck Appearance] : the appearance of the neck was normal [Neck Cervical Mass (___cm)] : no neck mass was observed [Jugular Venous Distention Increased] : there was no jugular-venous distention [Thyroid Diffuse Enlargement] : the thyroid was not enlarged [Thyroid Nodule] : there were no palpable thyroid nodules [Auscultation Breath Sounds / Voice Sounds] : lungs were clear to auscultation bilaterally [Heart Sounds] : normal S1 and S2 [Heart Rate And Rhythm] : heart rate was normal and rhythm regular [Heart Sounds Gallop] : no gallops [Heart Sounds Pericardial Friction Rub] : no pericardial rub [Full Pulse] : the pedal pulses are present [Edema] : there was no peripheral edema [Bowel Sounds] : normal bowel sounds [Abdomen Soft] : soft [Abdomen Tenderness] : non-tender [No CVA Tenderness] : no ~M costovertebral angle tenderness [Abdomen Mass (___ Cm)] : no abdominal mass palpated [No Spinal Tenderness] : no spinal tenderness [Abnormal Walk] : normal gait [Nail Clubbing] : no clubbing  or cyanosis of the fingernails [Motor Tone] : muscle strength and tone were normal [Musculoskeletal - Swelling] : no joint swelling seen [Skin Turgor] : normal skin turgor [Skin Color & Pigmentation] : normal skin color and pigmentation [] : no rash [No Focal Deficits] : no focal deficits [Motor Exam] : the motor exam was normal [Impaired Insight] : insight and judgment were intact [Oriented To Time, Place, And Person] : oriented to person, place, and time [Affect] : the affect was normal [FreeTextEntry1] : 3/6 + DILCIA of the right 2nd ICS

## 2024-03-29 NOTE — ASSESSMENT
[FreeTextEntry1] : Patient is a 79 year old female with Lowrisk myelodysplastic syndrome, previously treated with Revlimid and Procrit and Vidaza. 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. Patient is s/p 9 cycles of Vidaza and she was requiring pRBC transfusion every 3 weeks. Given the persistent transfusion dependence she was switched to Luspatercept.  DETECTED GENOMIC ALTERATIONS: Tier III: Variants of Unknown Clinical Significance [id number]  Results of BM biopsy (3/30/22) showed No e/o increased blasts. Pt is needing PRBCs almost every 3 weeks. Advised to follow up with Dr Ferrell to consider any other option or clinical trial. Had Called Dr Ferrell's office and discussed case with her. No clinical trial available, Rec PRBCS.  S/P port placement on 3/1/23.  PLAN: Previous notes reviewed including all relevant laboratory results and were communicated to the patient.  -- Continue Luspatercept 1.75 mg/kg every 3 weeks, due today. No need for transfusion this week SE discussed including: hypertension, abdominal pain, diarrhea, nausea, increased serum alanine aminotransferase, increased serum aspartate aminotransferase, increased serum bilirubin, dizziness, fatigue, headache, vertigo, arthralgia, musculoskeletal pain, ostealgia, decreased creatinine clearance, cough, dyspnea.  The therapy dose was adjusted according to pt's labs and anticipated tolerance. The high risk of complications and complexity associated with this therapy administration has been explained to the patient and her daughter and they were both agreeable. Treatment will be administered under my direct supervision.  -- Hold Retacrit for now. -- Monitor CBC weekly and administer 1 pRBC unit as needed when Hgb < 7 gm/dL. Pt has e/o iron overload, so transfusions need to be kept at a minimum if possible. She is maintaining her requirement of PRBCs almost Q 3-4 weeks. -- Continue to follow up with PCP and endo as recommended.  # Vitamin B 12 deficiency -- Continue Vitamin B 12 injection every 6 weeks, last given on 2/15/24.  # Thrombocytosis (controlled) -- Previously on Hydrea. -- Platelet stable -- Will continue to monitor.  #Chronic kidney disease -- Follow up with renal as recommended. # abd pain CT abd/pel ordered RTC in 3 weeks.

## 2024-03-30 DIAGNOSIS — D46.9 MYELODYSPLASTIC SYNDROME, UNSPECIFIED: ICD-10-CM

## 2024-04-04 RX ORDER — EMPAGLIFLOZIN 10 MG/1
10 TABLET, FILM COATED ORAL
Qty: 90 | Refills: 1 | Status: ACTIVE | COMMUNITY
Start: 2024-02-12 | End: 1900-01-01

## 2024-04-16 LAB — Lab: NORMAL

## 2024-04-18 ENCOUNTER — APPOINTMENT (OUTPATIENT)
Age: 79
End: 2024-04-18
Payer: MEDICARE

## 2024-04-18 ENCOUNTER — OUTPATIENT (OUTPATIENT)
Dept: OUTPATIENT SERVICES | Facility: HOSPITAL | Age: 79
LOS: 1 days | End: 2024-04-18
Payer: MEDICARE

## 2024-04-18 ENCOUNTER — LABORATORY RESULT (OUTPATIENT)
Age: 79
End: 2024-04-18

## 2024-04-18 VITALS
RESPIRATION RATE: 16 BRPM | HEART RATE: 66 BPM | DIASTOLIC BLOOD PRESSURE: 73 MMHG | TEMPERATURE: 97.5 F | SYSTOLIC BLOOD PRESSURE: 148 MMHG | HEIGHT: 60 IN | OXYGEN SATURATION: 93 %

## 2024-04-18 DIAGNOSIS — D46.9 MYELODYSPLASTIC SYNDROME, UNSPECIFIED: ICD-10-CM

## 2024-04-18 DIAGNOSIS — Z98.890 OTHER SPECIFIED POSTPROCEDURAL STATES: Chronic | ICD-10-CM

## 2024-04-18 DIAGNOSIS — D75.839 THROMBOCYTOSIS, UNSPECIFIED: ICD-10-CM

## 2024-04-18 DIAGNOSIS — D64.9 ANEMIA, UNSPECIFIED: ICD-10-CM

## 2024-04-18 DIAGNOSIS — Z79.899 ANEMIA, UNSPECIFIED: ICD-10-CM

## 2024-04-18 LAB
HCT VFR BLD CALC: 21.6 %
HGB BLD-MCNC: 7 G/DL
MCHC RBC-ENTMCNC: 30.2 PG
MCHC RBC-ENTMCNC: 32.4 G/DL
MCV RBC AUTO: 93.1 FL
PLATELET # BLD AUTO: 500 K/UL
PMV BLD: 9.9 FL
RBC # BLD: 2.32 M/UL
RBC # FLD: 20 %
WBC # FLD AUTO: 9.88 K/UL

## 2024-04-18 PROCEDURE — 99214 OFFICE O/P EST MOD 30 MIN: CPT

## 2024-04-18 PROCEDURE — 96401 CHEMO ANTI-NEOPL SQ/IM: CPT

## 2024-04-18 PROCEDURE — 36430 TRANSFUSION BLD/BLD COMPNT: CPT

## 2024-04-18 PROCEDURE — 99214 OFFICE O/P EST MOD 30 MIN: CPT | Mod: 25

## 2024-04-18 PROCEDURE — 85027 COMPLETE CBC AUTOMATED: CPT

## 2024-04-18 PROCEDURE — 96372 THER/PROPH/DIAG INJ SC/IM: CPT

## 2024-04-18 PROCEDURE — P9040: CPT

## 2024-04-18 RX ORDER — CYANOCOBALAMIN (VITAMIN B-12) 1000 MCG
1000 TABLET, EXTENDED RELEASE ORAL ONCE
Refills: 0 | Status: COMPLETED | OUTPATIENT
Start: 2024-04-18 | End: 2024-04-18

## 2024-04-18 RX ORDER — LUSPATERCEPT 25 MG/1
110 INJECTION, POWDER, LYOPHILIZED, FOR SOLUTION SUBCUTANEOUS ONCE
Refills: 0 | Status: COMPLETED | OUTPATIENT
Start: 2024-04-18 | End: 2024-04-18

## 2024-04-18 RX ADMIN — LUSPATERCEPT 110 MILLIGRAM(S): 25 INJECTION, POWDER, LYOPHILIZED, FOR SOLUTION SUBCUTANEOUS at 14:09

## 2024-04-18 RX ADMIN — Medication 1000 MICROGRAM(S): at 14:08

## 2024-04-18 NOTE — PHYSICAL EXAM
[Ambulatory and capable of all self care but unable to carry out any work activities] : Status 2- Ambulatory and capable of all self care but unable to carry out any work activities. Up and about more than 50% of waking hours [Normal] : affect appropriate [de-identified] : Lower extremity mild edema [de-identified] : Right chest port-A-cath intact, no erythema or tenderness noted.

## 2024-04-18 NOTE — HISTORY OF PRESENT ILLNESS
[de-identified] : This is a 75 year old  female with history of MDS. She's was previously treated with Revlimid 5 mg, 2 weeks on, 1 week off.  \par  She is also on Procrit.\par   She underwent colonoscopy (2/2017)  Results noted no colitis, only hyperplastic polyp noted. \par   \par   [de-identified] : She missed on appointment for procrit last week. She starts her next cycle on 6/25/18 C/o back pain radiating down her left which occurred when she bent to  something. No change in diarrhea.  7/31/18: Doing well. On Revlimid for more than 2yrs, 5 mg 2 weeks on 1 week off. She will be starting week on tonight.  C/o diarrhea. On Imodium 2 pills AM and 2 pills PM. Doesn't help much with diarrhea.  C/o nausea, abdominal pain.    Colonoscopy in 2016 was normal.  Did not have blood transfusion for over 2 years.  On procrit [id number] weekly. Missed last week on 7/24/18 as she was out of town. She has been non compliant with weekly Procrit. Mammogram in 2017 was normal.   9/11/18 pt is here for follow up. She feels the lomotil helps with the diarrhea. She was away for a few weeks and missed her procrit injections. No fever, abdominal  discomfort has been less since since use of lomotil. She started a new cycle 2 days ago.  10/2/18: She will start Revlimid tonight (week on). 2 weeks on, 1 week off.  On ASA 81mg.  Denies fever, nausea, vomiting, chest pain, SOB, abdominal pain, and bladder problems. C/o diarrhea.  S/p 2 units PRBC transfusion on 9/25/18.  On Procrit 72280 units weekly. Not compliant every week.  C/o intermittent dizziness.   10/31/18 Pt is here for follow up. C/O significant fatigue. Continues to have diarrhea, takes imodium and lomotil as needed. C/o dry cough, no fever. Cough started a month ago. It is worse in the mornings however over last week it has been constant all day. No chest pain. She was found to have low B12 levels and was started on B12 injections.  11/27/18: Doing well. Hospitalized for 3 days for cellulitis/abcess of the back s/p drainage and on Abx currently. Developed 3 days after Mg infusion as per pt.  Denies nausea, vomiting, chest pain, SOB, abdominal pain, bowel and bladder problems. This is the week on Revlimid (week 1). S/p 1 unit PRBC transfusion in the hospital.  On Procrit weekly   12/27/18: Doing well. No major complaints. Denies fever, nausea, vomiting, chest pain, SOB, abdominal pain, and bladder problems. On Revlimid 5 mg 2 weeks on 1 week off. This is the week off. She will start the week on sunday (12/30/18). Due for Procrit 58212 units today.  Still has diarrhea. 4-5bm/day. On monthly B12 injections.   1/30/19: Patient here for follow up for MDS.  She is taking Revlimid 5 mg, 2 weeks on, 1 week off.  She will complete this current cycle on Saturday.  She has no new complaints today.  Patient denies cough, shortness of breath, denies fever, denies bone pain.  03/04/2019 Patient is here for a follow up visit. She complaints of severe pain in her left shoulder and has had abscess drained 2 weeks ago. However the pain is worse and she has purulent discharge on examination. She also has Nasal sores that are painful. Culture from 11/2018 showed MRSA.  Denies Fever.  She also complaints of swelling in her right leg. Not tender and not erythematous and negative Gong;s sign.  Her diarrhea with Revlimid is ongoing and Imodium and Lomotil helps but not everyday.  She is on 60,000 units of procrit weekly and Revlimid 5 mg 2 weeks on 1 week off.   4/23/19 Pt is here for follow up. She feels well. The nasal sores have improved with bactroban The abscess on left shoulder has resolved. However she has a new one on the middle of her back. No fever. Pt has been on procrit 69854 units weekly, however she missed a dose in between.  5/8/19 pt is here for follow up. no new complaints. feels well. Her skin abscess has resolved. she has been unable to go to ID, as she could not get an appt. No bleeding. She is compliant with revlimid.  6/6/19 Pt is here for follow up. no new complaints  Feels well. Is on week 2 of her Revlimid cycle.  No transfusions since last visit  7/17/19 Pt is here for follow up. C/O fatigue. Was away for a few days two weeks ago, but missed treatment with procrit for 2 weeks. Is complaint with Revlimid 2 weeks on 1 week off. Diarrhea is a little improved.  8/14/19 Patient here for follow up visit, feeling well.  Although she is complaining of some pain at the left scapula area recently.  Patient denies cough, shortness of breath, denies fever, denies other bone pain.  She is off Revlimid  this week, due to restart on Monday.  She is scheduled for Procrit and Vitamin B12 injection today.  She plans to leave the country tomorrow to visit her mother who is ill.  She will return in about 10 days.  9/11/19 Pt is here for follow up. She was away for 3 weeks, out of which she took procrit for 2 weeks in [place]. She missed last week's dose. She had CBCs in [place] which showed Hgb of 8.9 She feels well. She still getting diarrhea. She has been using lomotil with very minimal relief. She started a new cycle of Revlimid 4 days ago.  10/3/19 Patient here for follow up visit, feeling fairly well.   Patient denies cough, shortness of breath, denies fever, denies other bone pain.  She remains on Revlimid.  She is scheduled for Procrit and Vitamin B12 injection today.  10/24/19 Pt is here for follow up. Feels well. No SOB, fatigue. Compliant with procrit and Revl;imid. Remains on ASa. Diarrhea is unchanged. Pt takes imodium as needed.  11/14/19 Pt is here for follow up. No new complaints. Starts a new cycle of Revlimid today. Diarrhea is same as before, no rectal bleeding. No fever.  12/5/19 Pt is here for follow up. No new complaints. Denies feeling weaker than usual. No fever, SOB. No change in frequency of diarrhea. No pain. Will start next cycle of Revlimid in 3 days.   !/16/20 Pt was recently DCed from [facility] 3 days ago after being treated for pneumonia and MARCO. She is on po Levaquin. She restarted Revlmid 3 days ago. She is feeling better now. No fever. No SOB, Chest pain and back pain has resolved. Pt has a cough, no expectoration. She also recd a unit of PRBCs last week in the hospital   1/30/20 Patient here for follow up visit, feeling well.  She has no new complaints. Cough is almost gone completely.  Patient denies shortness of breath, denies fever, denies bone pain.  Her appetite is ok, weight is stable.  She is due to restart Revlimid on Monday.  02/20/20 Pt is here for follow up, feeling well. Offers no new complaints. Denies SOB, fever, chills, weight loss, CP, cough.  Currently off week of Revlimid 5 mg. Restarts on Monday. Has procrit 80,000 units today. Next b 12 injection due in 2 weeks  Did not get chest Xray CBC reviewed WBC 3.1, h/h 8.3/25%, plt 386, neutrophils 1.29  3/12/20 Pt is here for follow up. She took Revlimid for 2 weeks and then has been off for one week although she was advised to take it daily without break. No SOB, Cough, fever. Has mild fatigue.  04/02/2020 SIMONA KUHN a 75 year F is here today for follow up visit of MDS. Denies fever, chills, cough,night sweats, weight loss.  Denies bleeding or bruising. Pt receives procrit 80,000 units weekly and B12 IM monthly.  Continued Revlimid 5 mg daily x 3 weeks, has 1 dose left tonight.  CBC reviewed: WBC 3.2, H/H 8.1/25.2, neutrophils 1.6, PLT 72  4/20/2020: The patient is here for follow up . She has no complaints of fever, chills, dizziness , chest pain and shortness of breath . She is still with diarrhea , up to 10 watery BMs per day, responds poorly to imodium and lomotil. She is on Revlimid 5 mg daily , took last dose yesterday night. Her last Procrit was on April 13.   5/26/20 Pt is here for follow up. She is feeling well. Denies SOB, chest pain, fever. Continues to have diarrhea although she is off of Revlmid. Thinks it may due to diabetic meds. Wants to discuss BM bx results  6/22/20 Pt is here for follow up. Feels well. Denies any fever, N, V, D Continues to have diarrhea, she will talk to her PCP about changing metformin for DM. Has been needing PRBCs 1 unit each month  7/20/20 Pt is here for follow up. No new complaints. Has been feeling well. No SOB, CP.  No N, V, D. She has recd 2 units of PRBCs since May 20.  8/17/20 Pt is here for a follow up visit. She is feeling well. No new symptoms. No N, V, D. No bruising or bleeding. She continues to need PRBCs every 2-3 weeks.  8/31/20 Pt is here for follow up. She is feeling well. No N, V, D. She is tolerating the hydrea well. No SOB, CP No bruising ior bleeding  9/8/20 Pt is here for follow up. She had been contacted by the [facility] last week to advise her to stop the hydrea. Pt did not check her messages and continued with Hydrea. No fever, N, V, bleeding. Saw Dr Ferrell last week.  9/14/20 Pt is here for folow up. Besides her usual complaint of diarrhea she is feeling well. Recd 1 unit PRBC last week. Has stopped Hydrea. No fever, mouth sores.  9/29/20 Pt is here for folow up. No new complaints. Is seeing GI for diarrhea net week. No fever, night sweats. REcd 3 units of PRBC this month  10/13/20 Pt is here for follow up. No new complaints. Has not needed a transfusion since 3 weeks ago. Continues to have diarrhea, waiting to be seen by GI  10/26/20 Pt is here for follow up. C/O feeling fatigued. Has CLARK. No nausea or vomiting. No fever. Diarrhea has improved a little. She is no longer on Metformin  11/9/20 Pt is here for follow up. Feels well. Denies SOB, CP, fatigue  12/7/20- Patient is for follow up and is due for cycle 9 of Vidaza.  She still has loose BM sometimes 10 times a day and was seeing Dr. Corey from GI- did not follow up recently and is unable to get an appt with him. She has lost about 10 lbs since September. No N/V. No fever or chills. No CP/SOB. She has been getting PRBC transfusion every 3 weeks now  01/04/20 Pt presented today for f/u visit . She is s/p 8 cycles of vidaza and was started on Luspatercept in Dec , 2020 . So far she received 1 injection , she is due for 2nd injection today . Adverse effect profile was discussed with her in detail , she reports having some dizziness and weakness after first injection . She received blood transfusion last wk . Blood work from today reviewed as well and counts are adequate . She denies any fevers,  chills,  or any new symptoms .   1/25/21 Pt is here for follow up. C/O diarrhea, otherwise feeling better. Has not needed a transfusion since 12/29 2/16/21 Pt is here for follow up. Needed transfusion on 2/8/21. Continues to C/o fatigue. Diarrhea remains the same, has not made GI appt as yet.  3/8/21 Pt is here for follow up. Feels better today. Recd 1 unit PRBCs last week. Diarrhea is same as before. has an appt with GI next week. No fever  3/30/21 Pt is here for follow up. Feels better. Last transfusion was on 3/2/21 Saw GI and was started on cholestyramine and metformin was DCED with resolution of diarrhea. She denies any SOB, Fatigue is better  4/20/2021 Pt is here to f/u for MDS She is s/p Luspatercept cycle #6 She is compliant with Aspirin She feels better today, appetite is good She denies shortness of breath, fatigue, or weakness  She c/o of diarrhea but it has improved since she was started on Cholestyramine. Stool is soft and less in frequency She received COVID vaccine x 2 doses  5/11/21 pt is here for follow up. Feels the same with fatigue, diarrhea. No SOB  6/21/21 Pt is here for follow up. Feels well. No SOB, CP, N< V. Diarrhea is better controlled now. Last PRBC transfusion was 2 weeks ago.  7/19/21 Pt is here for follow up. Feels a little tired, last transfusion was 6/1/21. No bleeding, fever, SOB  8/10/2021 Patient is here to follow up for her MDS. She is on Luspatercept, tolerating well. She feels well today, the appetite is good. She denies shortness of breath, fatigue, fever, night sweats, abdominal pain, arthralgias/myalgias.  8/31/21 Pt is here for follow up. C/O feeling very fatigued. No bleeding. No fever.  9/21/2021 Patient is here to follow up for MDS. She is on Luspatercept and Retacrit, tolerating well. She gets blood transfusion as needed for Hgb <7 gm/dL She is c/o of fatigue, no shortness of breath, dizziness, chills or lightheadedness. She denies melena or hematochezia. Her appetite is good, no nausea/vomiting.  10/28/2021 Patient is here to follow up for MDS. She is on Luspatercept and Retacrit, tolerating well. She gets blood transfusion to keep Hgb > 7 gm/dL. She is c/o of chronic fatigue, no shortness of breath, dizziness, chills or lightheadedness. She denies melena or hematochezia. Her appetite is good, no nausea/vomiting. She c/o of severe diarrhea, 10-12x/day watery stool while on Imodium in the last 2 weeks. Last colonoscopy was in 2016, benign as per patient.  11/18/21 Pt is here for follow up. C/O fatigue. No SOB, CP  12/9/21 Pt is here for follow up. Feels better today. Recd 2 units last week in ER. Planning to go to [place] for over a week and does not want to miss her procrit dose. Diarrhea is stable,  12/23/21 Pt is here for follow up. Feels better. No SOB, CP. Going to [place] tomorrow. Has not been able to get Procrit injection from the pharmacy yet d/t insurance issues  2/3/22 Pt is here for follow up. Feeling same as usual. No SOB, CP. Last transfusion was 2 weeks ago in ER> No bleeding reported  3/3/22 Pt is here for follow up. C/O fatigue. Had black stools X2  3/30/22 Pt is feeling well. Here for treatment and BM biopsy  4/21/22 Pt is here fir follow up. Was in ER last week. Was give 2 units of PRBCs. Pt feels less tired today. Wants to discuss BM rslts  5/12/22 Pt is here today for follow up visit. Pt c/o feeling tired.  Hgb=6.9.  One unit transfusion scheduled.  6/23/22 Pt is here for follow up. Feels well today. No SOB, chest pain. Has not made appt with [facility]  7/14/22 Pt is here for follow up for lowrisk myelodysplastic syndrome. She came in late today because she was not feeling well this morning- dizziness & weakness. She denies SOB, CP.  9/22/22 Pt is here for follow up. C/O fatigue. was in the ER 3 weeks ago and recd 2 U PRBCS  11/10/22 Patient is here to follow up for MDS/MPN. S/P 2 units pRBC last weekend, feeling much better now. She denies shortness of breath, chest pain or headache.  1/5/2023 Patient is here to follow up for MDS/MPN, accompanied by spouse and daughter. She is feeling much better after she received blood transfusion on 12/29/22. She denies shortness of breath, chest pain, dizziness or headache. Pt fell on 12/28/22 when she slipped while getting down the stairs and landed on her right knee. Now c/o of severe right knee pain with limited ROM. She has not been evaluated since the incident happened. She is taking Aleeve with minimal relief.  1/19/2023 Patient is here to follow up for MDS/MPN and treatment. She feel fine, has fatigue not worse than her baseline. She denies shortness of breath, chest pain, dizziness, headache or bleeding. She was evaluated at the ER on 1/5/2023 for her fall and imaging showed no fracture, except for mild joint effusion.  2/9/23 Patient presents for follow up today. She complains of light-headedness. Her BP is elevated to 180/70 mmHg. She does not take any meds and says she is not diagnosed with HTN. She is due for Procrit & Luspatercept today. Reports her energy level is at baseline. No other complaints today. Her CMP showed serum glucose > 600 mg/dl, she was sent to ER with the transport.  3/2/32 Patient here for follow up for MDS. She is scheduled for next luspatercept injection. She is status post port placement yesterday, so she has some discomfort at chest area from that. She feels somewhat weak, appetite is adequate, weight is stable. She is under the care of endocrinologist to try to get her DM under better control. She was recommended to take ASA 81 mg when she was discharged from the hospital.  3/23/23 Patient here for follow up for MDS. She is scheduled for next Luspatercept injection. She is status post port placement She feels somewhat weak, appetite is adequate, weight is stable. She is under the care of endocrinologist to try to get her DM under better control. She was recommended to take ASA 81 mg when she was discharged from the hospital.  4/13/23 Pt is here for follow up. Daughter is with her. Neuro eval has not been completed yet. No bleeding, SOB, pain recd 2 Units PRBCs 2 weeks ago  5/4/23 Pt is here for follow up. Pt C/O not feeling well today. Denies pain. Has dizziness. Fasting bld sugar was over 350. She is not on insulin. Denies diarrhea, N, SOB, CP  5/25/23 Pt is here for follow up, accompanied by formal caregiver. She feels well except for fatigue which is unchanged from baseline. She denies chest pain, shortness of breath, bleeding or decreased urinary output. She states that she saw nephro last week and follows up with her PCP for her diabetes.  6/15/23 Pt is here for follow up. Feels weak, No SOB, CP  8/3/23 Pt is here for follow up Feels fatigued. Has. chronic diarrhea which has been attributed to her diabetes meds  8/30/23 Pt is here for follow C/O diarrhea and fatigue  11/7/2023 Patient is here for follow up. She is s/p 1 unit pRBC on 10/26/23. She feels fatigue, unchanged from her baseline. She denies shortness of breath, dizziness, lightheadedness or chest pain.  11/28/23 Patient is here for follow up. She continues to report sx of fatigue, unchanged from her baseline. She denies shortness of breath, dizziness, lightheadedness or chest pain. Patient endorses sensation of abdominal discomfort and general feeling of feeling unwell unable to further describe / elaborate   1/4/24 Patient is here to follow up for MDS/MPN, accompanied by spouse. She feels more fatigue and dizzy, denies shortness of breath, chest pain, bleeding of palpitations,. She did not report to the ER yesterday for blood transfusion as advised for Hgb 6.4 gm/dL.  1/25/24 Patient is here to follow up for MDS/MPN, accompanied by her caregiver. She feels fatigue which is unchanged from her baseline, denies shortness of breath, chest pain, bleeding of palpitations, Her last blood transfusion was on 1/4/24 int he ER. She had seen the nephrologist and was recommended further lab test.  2/15/24 Patient is here to follow up for MDS/MPN, accompanied by her caregiver. She feels fatigue which is unchanged from her baseline, denies shortness of breath, chest pain, bleeding or palpitations, Her last blood transfusion was on 2/1/24 in the ER.  3/7/24 Patient is here to follow up for MDS/MPN, accompanied by her caregiver. She feels fatigue which is unchanged from her baseline, denies shortness of breath, chest pain, bleeding or palpitations. She gets home labs, last Hgb 6.4 gm/dL on 3/6/24.  3.28.24 Pt is here for follow up. She was transfused 2 units last week. C/O abd pain, gaseous bloating. No rectal or vaginal bleeding. No fever.  4/18/24: Pt is here for follow up. she reports feeling well.  She denied Nausea, vomiting and diarrhea. No rectal or vaginal bleeding.  CBC reviewed with HGB/ HCT is 7.0/21.6.  Schedule for one unit of blood transfusion.

## 2024-04-18 NOTE — REVIEW OF SYSTEMS
[Fatigue] : fatigue [Recent Change In Weight] : ~T no recent weight change [Shortness Of Breath] : no shortness of breath [Negative] : Allergic/Immunologic [FreeTextEntry2] : Fatigue is unchanged from baseline.

## 2024-04-18 NOTE — ASSESSMENT
[FreeTextEntry1] : Patient is a 79 year old female with Lowrisk myelodysplastic syndrome, previously treated with Revlimid and Procrit and Vidaza. 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. Patient is s/p 9 cycles of Vidaza and she was requiring pRBC transfusion every 3 weeks. Given the persistent transfusion dependence she was switched to Luspatercept.  DETECTED GENOMIC ALTERATIONS: Tier III: Variants of Unknown Clinical Significance SF3B1 [id number]  Results of BM biopsy (3/30/22) showed No e/o increased blasts. Pt is needing PRBCs almost every 3 weeks. Advised to follow up with Dr Ferrell to consider any other option or clinical trial. Had Called Dr Ferrell's office and discussed case with her. No clinical trial available, Rec PRBCS.  S/P port placement on 3/1/23.  PLAN: Previous notes reviewed including all relevant laboratory results and were communicated to the patient.  -- Continue Luspatercept 1.75 mg/kg every 3 weeks, due today. she will proceed with one unit of blood transfusion today 4/18/24.  SE discussed including: hypertension, abdominal pain, diarrhea, nausea, increased serum alanine aminotransferase, increased serum aspartate aminotransferase, increased serum bilirubin, dizziness, fatigue, headache, vertigo, arthralgia, musculoskeletal pain, ostealgia, decreased creatinine clearance, cough, dyspnea.  The therapy dose was adjusted according to pt's labs and anticipated tolerance. The high risk of complications and complexity associated with this therapy administration has been explained to the patient and her daughter and they were both agreeable. Treatment will be administered under my direct supervision.  -- Hold Retacrit for now. -- Monitor CBC weekly and administer 1 pRBC unit as needed when Hgb < 7 gm/dL. Pt has e/o iron overload, so transfusions need to be kept at a minimum if possible. She is maintaining her requirement of PRBCs almost Q 3-4 weeks. -- Continue to follow up with PCP and endo as recommended.  # Vitamin B 12 deficiency -- Continue Vitamin B 12 injection every 6 weeks, she is due today 4/18/24.    # Thrombocytosis (controlled) -- Previously on Hydrea. -- Platelet stable -- Will continue to monitor.  #Chronic kidney disease -- Follow up with renal as recommended. # abd pain CT abd/pel ordered RTC in 3 weeks.

## 2024-04-19 DIAGNOSIS — D46.9 MYELODYSPLASTIC SYNDROME, UNSPECIFIED: ICD-10-CM

## 2024-04-26 ENCOUNTER — APPOINTMENT (OUTPATIENT)
Age: 79
End: 2024-04-26

## 2024-04-26 ENCOUNTER — OUTPATIENT (OUTPATIENT)
Dept: OUTPATIENT SERVICES | Facility: HOSPITAL | Age: 79
LOS: 1 days | End: 2024-04-26

## 2024-04-26 DIAGNOSIS — D46.9 MYELODYSPLASTIC SYNDROME, UNSPECIFIED: ICD-10-CM

## 2024-05-03 ENCOUNTER — APPOINTMENT (OUTPATIENT)
Age: 79
End: 2024-05-03

## 2024-05-09 ENCOUNTER — APPOINTMENT (OUTPATIENT)
Age: 79
End: 2024-05-09

## 2024-05-10 ENCOUNTER — APPOINTMENT (OUTPATIENT)
Age: 79
End: 2024-05-10

## 2024-05-23 ENCOUNTER — INPATIENT (INPATIENT)
Facility: HOSPITAL | Age: 79
LOS: 0 days | Discharge: HOME | DRG: 812 | End: 2024-05-24
Attending: STUDENT IN AN ORGANIZED HEALTH CARE EDUCATION/TRAINING PROGRAM | Admitting: HOSPITALIST
Payer: MEDICARE

## 2024-05-23 VITALS
DIASTOLIC BLOOD PRESSURE: 67 MMHG | RESPIRATION RATE: 18 BRPM | WEIGHT: 134.92 LBS | HEIGHT: 62 IN | TEMPERATURE: 99 F | OXYGEN SATURATION: 99 % | HEART RATE: 91 BPM | SYSTOLIC BLOOD PRESSURE: 125 MMHG

## 2024-05-23 DIAGNOSIS — E87.5 HYPERKALEMIA: ICD-10-CM

## 2024-05-23 DIAGNOSIS — Z98.890 OTHER SPECIFIED POSTPROCEDURAL STATES: Chronic | ICD-10-CM

## 2024-05-23 LAB
ALBUMIN SERPL ELPH-MCNC: 4.5 G/DL — SIGNIFICANT CHANGE UP (ref 3.5–5.2)
ALP SERPL-CCNC: 137 U/L — HIGH (ref 30–115)
ALT FLD-CCNC: 11 U/L — SIGNIFICANT CHANGE UP (ref 0–41)
ANION GAP SERPL CALC-SCNC: 10 MMOL/L — SIGNIFICANT CHANGE UP (ref 7–14)
ANION GAP SERPL CALC-SCNC: 13 MMOL/L — SIGNIFICANT CHANGE UP (ref 7–14)
ANION GAP SERPL CALC-SCNC: 15 MMOL/L — HIGH (ref 7–14)
ANISOCYTOSIS BLD QL: SLIGHT — SIGNIFICANT CHANGE UP
AST SERPL-CCNC: 14 U/L — SIGNIFICANT CHANGE UP (ref 0–41)
BASE EXCESS BLDV CALC-SCNC: -6.1 MMOL/L — LOW (ref -2–3)
BASOPHILS # BLD AUTO: 0.35 K/UL — HIGH (ref 0–0.2)
BASOPHILS NFR BLD AUTO: 4.4 % — HIGH (ref 0–1)
BILIRUB SERPL-MCNC: 1.5 MG/DL — HIGH (ref 0.2–1.2)
BLD GP AB SCN SERPL QL: SIGNIFICANT CHANGE UP
BUN SERPL-MCNC: 45 MG/DL — HIGH (ref 10–20)
BUN SERPL-MCNC: 45 MG/DL — HIGH (ref 10–20)
BUN SERPL-MCNC: 48 MG/DL — HIGH (ref 10–20)
CA-I SERPL-SCNC: 1.23 MMOL/L — SIGNIFICANT CHANGE UP (ref 1.15–1.33)
CALCIUM SERPL-MCNC: 9 MG/DL — SIGNIFICANT CHANGE UP (ref 8.4–10.5)
CALCIUM SERPL-MCNC: 9.5 MG/DL — SIGNIFICANT CHANGE UP (ref 8.4–10.5)
CALCIUM SERPL-MCNC: 9.5 MG/DL — SIGNIFICANT CHANGE UP (ref 8.4–10.5)
CHLORIDE SERPL-SCNC: 106 MMOL/L — SIGNIFICANT CHANGE UP (ref 98–110)
CHLORIDE SERPL-SCNC: 107 MMOL/L — SIGNIFICANT CHANGE UP (ref 98–110)
CHLORIDE SERPL-SCNC: 107 MMOL/L — SIGNIFICANT CHANGE UP (ref 98–110)
CO2 SERPL-SCNC: 15 MMOL/L — LOW (ref 17–32)
CO2 SERPL-SCNC: 16 MMOL/L — LOW (ref 17–32)
CO2 SERPL-SCNC: 19 MMOL/L — SIGNIFICANT CHANGE UP (ref 17–32)
CREAT SERPL-MCNC: 1.6 MG/DL — HIGH (ref 0.7–1.5)
CREAT SERPL-MCNC: 1.7 MG/DL — HIGH (ref 0.7–1.5)
CREAT SERPL-MCNC: 1.7 MG/DL — HIGH (ref 0.7–1.5)
EGFR: 30 ML/MIN/1.73M2 — LOW
EGFR: 30 ML/MIN/1.73M2 — LOW
EGFR: 33 ML/MIN/1.73M2 — LOW
EOSINOPHIL # BLD AUTO: 0 K/UL — SIGNIFICANT CHANGE UP (ref 0–0.7)
EOSINOPHIL NFR BLD AUTO: 0 % — SIGNIFICANT CHANGE UP (ref 0–8)
GAS PNL BLDV: 132 MMOL/L — LOW (ref 136–145)
GAS PNL BLDV: SIGNIFICANT CHANGE UP
GIANT PLATELETS BLD QL SMEAR: PRESENT — SIGNIFICANT CHANGE UP
GLUCOSE BLDC GLUCOMTR-MCNC: 238 MG/DL — HIGH (ref 70–99)
GLUCOSE BLDC GLUCOMTR-MCNC: 261 MG/DL — HIGH (ref 70–99)
GLUCOSE BLDC GLUCOMTR-MCNC: 288 MG/DL — HIGH (ref 70–99)
GLUCOSE SERPL-MCNC: 209 MG/DL — HIGH (ref 70–99)
GLUCOSE SERPL-MCNC: 210 MG/DL — HIGH (ref 70–99)
GLUCOSE SERPL-MCNC: 311 MG/DL — HIGH (ref 70–99)
HCO3 BLDV-SCNC: 20 MMOL/L — LOW (ref 22–29)
HCT VFR BLD CALC: 19.2 % — LOW (ref 37–47)
HCT VFR BLDA CALC: 21 % — CRITICAL LOW (ref 34.5–46.5)
HGB BLD CALC-MCNC: 7.1 G/DL — LOW (ref 11.7–16.1)
HGB BLD-MCNC: 6.2 G/DL — CRITICAL LOW (ref 12–16)
LACTATE BLDV-MCNC: 1.9 MMOL/L — SIGNIFICANT CHANGE UP (ref 0.5–2)
LYMPHOCYTES # BLD AUTO: 0.9 K/UL — LOW (ref 1.2–3.4)
LYMPHOCYTES # BLD AUTO: 11.4 % — LOW (ref 20.5–51.1)
MACROCYTES BLD QL: SLIGHT — SIGNIFICANT CHANGE UP
MANUAL SMEAR VERIFICATION: SIGNIFICANT CHANGE UP
MCHC RBC-ENTMCNC: 29.7 PG — SIGNIFICANT CHANGE UP (ref 27–31)
MCHC RBC-ENTMCNC: 32.3 G/DL — SIGNIFICANT CHANGE UP (ref 32–37)
MCV RBC AUTO: 91.9 FL — SIGNIFICANT CHANGE UP (ref 81–99)
MICROCYTES BLD QL: SLIGHT — SIGNIFICANT CHANGE UP
MONOCYTES # BLD AUTO: 0.55 K/UL — SIGNIFICANT CHANGE UP (ref 0.1–0.6)
MONOCYTES NFR BLD AUTO: 7 % — SIGNIFICANT CHANGE UP (ref 1.7–9.3)
NEUTROPHILS # BLD AUTO: 6.08 K/UL — SIGNIFICANT CHANGE UP (ref 1.4–6.5)
NEUTROPHILS NFR BLD AUTO: 77.2 % — HIGH (ref 42.2–75.2)
OVALOCYTES BLD QL SMEAR: SLIGHT — SIGNIFICANT CHANGE UP
PCO2 BLDV: 43 MMHG — HIGH (ref 39–42)
PH BLDV: 7.28 — LOW (ref 7.32–7.43)
PLAT MORPH BLD: ABNORMAL
PLATELET # BLD AUTO: 535 K/UL — HIGH (ref 130–400)
PLATELET CLUMP BLD QL SMEAR: SLIGHT
PMV BLD: 10.4 FL — SIGNIFICANT CHANGE UP (ref 7.4–10.4)
PO2 BLDV: 37 MMHG — SIGNIFICANT CHANGE UP (ref 25–45)
POIKILOCYTOSIS BLD QL AUTO: SLIGHT — SIGNIFICANT CHANGE UP
POLYCHROMASIA BLD QL SMEAR: SLIGHT — SIGNIFICANT CHANGE UP
POTASSIUM BLDV-SCNC: 6.2 MMOL/L — CRITICAL HIGH (ref 3.5–5.1)
POTASSIUM SERPL-MCNC: 6.1 MMOL/L — CRITICAL HIGH (ref 3.5–5)
POTASSIUM SERPL-MCNC: 6.2 MMOL/L — CRITICAL HIGH (ref 3.5–5)
POTASSIUM SERPL-MCNC: 6.5 MMOL/L — CRITICAL HIGH (ref 3.5–5)
POTASSIUM SERPL-SCNC: 6.1 MMOL/L — CRITICAL HIGH (ref 3.5–5)
POTASSIUM SERPL-SCNC: 6.2 MMOL/L — CRITICAL HIGH (ref 3.5–5)
POTASSIUM SERPL-SCNC: 6.5 MMOL/L — CRITICAL HIGH (ref 3.5–5)
PROT SERPL-MCNC: 6.2 G/DL — SIGNIFICANT CHANGE UP (ref 6–8)
RBC # BLD: 2.09 M/UL — LOW (ref 4.2–5.4)
RBC # FLD: 19.5 % — HIGH (ref 11.5–14.5)
RBC BLD AUTO: ABNORMAL
SAO2 % BLDV: 60.5 % — LOW (ref 67–88)
SODIUM SERPL-SCNC: 135 MMOL/L — SIGNIFICANT CHANGE UP (ref 135–146)
SODIUM SERPL-SCNC: 136 MMOL/L — SIGNIFICANT CHANGE UP (ref 135–146)
SODIUM SERPL-SCNC: 137 MMOL/L — SIGNIFICANT CHANGE UP (ref 135–146)
WBC # BLD: 7.88 K/UL — SIGNIFICANT CHANGE UP (ref 4.8–10.8)
WBC # FLD AUTO: 7.88 K/UL — SIGNIFICANT CHANGE UP (ref 4.8–10.8)

## 2024-05-23 PROCEDURE — 93010 ELECTROCARDIOGRAM REPORT: CPT

## 2024-05-23 PROCEDURE — 80048 BASIC METABOLIC PNL TOTAL CA: CPT

## 2024-05-23 PROCEDURE — 93005 ELECTROCARDIOGRAM TRACING: CPT

## 2024-05-23 PROCEDURE — 85027 COMPLETE CBC AUTOMATED: CPT

## 2024-05-23 PROCEDURE — 80053 COMPREHEN METABOLIC PANEL: CPT

## 2024-05-23 PROCEDURE — 36430 TRANSFUSION BLD/BLD COMPNT: CPT

## 2024-05-23 PROCEDURE — 99239 HOSP IP/OBS DSCHRG MGMT >30: CPT

## 2024-05-23 PROCEDURE — 85025 COMPLETE CBC W/AUTO DIFF WBC: CPT

## 2024-05-23 PROCEDURE — 83735 ASSAY OF MAGNESIUM: CPT

## 2024-05-23 PROCEDURE — 83036 HEMOGLOBIN GLYCOSYLATED A1C: CPT

## 2024-05-23 PROCEDURE — 71045 X-RAY EXAM CHEST 1 VIEW: CPT

## 2024-05-23 PROCEDURE — P9040: CPT

## 2024-05-23 PROCEDURE — 36415 COLL VENOUS BLD VENIPUNCTURE: CPT

## 2024-05-23 PROCEDURE — 82962 GLUCOSE BLOOD TEST: CPT

## 2024-05-23 PROCEDURE — 99285 EMERGENCY DEPT VISIT HI MDM: CPT

## 2024-05-23 RX ORDER — ALBUTEROL 90 UG/1
2.5 AEROSOL, METERED ORAL ONCE
Refills: 0 | Status: COMPLETED | OUTPATIENT
Start: 2024-05-23 | End: 2024-05-23

## 2024-05-23 RX ORDER — CALCIUM GLUCONATE 100 MG/ML
1 VIAL (ML) INTRAVENOUS ONCE
Refills: 0 | Status: COMPLETED | OUTPATIENT
Start: 2024-05-23 | End: 2024-05-23

## 2024-05-23 RX ORDER — INSULIN LISPRO 100/ML
VIAL (ML) SUBCUTANEOUS
Refills: 0 | Status: DISCONTINUED | OUTPATIENT
Start: 2024-05-23 | End: 2024-05-24

## 2024-05-23 RX ORDER — SODIUM CHLORIDE 9 MG/ML
1000 INJECTION, SOLUTION INTRAVENOUS
Refills: 0 | Status: DISCONTINUED | OUTPATIENT
Start: 2024-05-23 | End: 2024-05-24

## 2024-05-23 RX ORDER — LISINOPRIL 2.5 MG/1
10 TABLET ORAL DAILY
Refills: 0 | Status: DISCONTINUED | OUTPATIENT
Start: 2024-05-23 | End: 2024-05-23

## 2024-05-23 RX ORDER — HYDRALAZINE HCL 50 MG
10 TABLET ORAL EVERY 8 HOURS
Refills: 0 | Status: DISCONTINUED | OUTPATIENT
Start: 2024-05-23 | End: 2024-05-24

## 2024-05-23 RX ORDER — DEXTROSE 50 % IN WATER 50 %
25 SYRINGE (ML) INTRAVENOUS ONCE
Refills: 0 | Status: DISCONTINUED | OUTPATIENT
Start: 2024-05-23 | End: 2024-05-24

## 2024-05-23 RX ORDER — DEXTROSE 50 % IN WATER 50 %
12.5 SYRINGE (ML) INTRAVENOUS ONCE
Refills: 0 | Status: DISCONTINUED | OUTPATIENT
Start: 2024-05-23 | End: 2024-05-24

## 2024-05-23 RX ORDER — IPRATROPIUM/ALBUTEROL SULFATE 18-103MCG
3 AEROSOL WITH ADAPTER (GRAM) INHALATION ONCE
Refills: 0 | Status: COMPLETED | OUTPATIENT
Start: 2024-05-23 | End: 2024-05-23

## 2024-05-23 RX ORDER — HYDRALAZINE HCL 50 MG
1 TABLET ORAL
Refills: 0 | DISCHARGE

## 2024-05-23 RX ORDER — SODIUM ZIRCONIUM CYCLOSILICATE 10 G/10G
10 POWDER, FOR SUSPENSION ORAL ONCE
Refills: 0 | Status: COMPLETED | OUTPATIENT
Start: 2024-05-23 | End: 2024-05-23

## 2024-05-23 RX ORDER — DEXTROSE 50 % IN WATER 50 %
15 SYRINGE (ML) INTRAVENOUS ONCE
Refills: 0 | Status: DISCONTINUED | OUTPATIENT
Start: 2024-05-23 | End: 2024-05-24

## 2024-05-23 RX ORDER — GLIMEPIRIDE 1 MG
1 TABLET ORAL
Refills: 0 | DISCHARGE

## 2024-05-23 RX ORDER — LINAGLIPTIN 5 MG/1
1 TABLET, FILM COATED ORAL
Qty: 0 | Refills: 0 | DISCHARGE

## 2024-05-23 RX ORDER — NIFEDIPINE 30 MG
90 TABLET, EXTENDED RELEASE 24 HR ORAL DAILY
Refills: 0 | Status: DISCONTINUED | OUTPATIENT
Start: 2024-05-23 | End: 2024-05-24

## 2024-05-23 RX ORDER — DEXTROSE 50 % IN WATER 50 %
25 SYRINGE (ML) INTRAVENOUS ONCE
Refills: 0 | Status: COMPLETED | OUTPATIENT
Start: 2024-05-23 | End: 2024-05-23

## 2024-05-23 RX ORDER — GLUCAGON INJECTION, SOLUTION 0.5 MG/.1ML
1 INJECTION, SOLUTION SUBCUTANEOUS ONCE
Refills: 0 | Status: DISCONTINUED | OUTPATIENT
Start: 2024-05-23 | End: 2024-05-24

## 2024-05-23 RX ORDER — INSULIN LISPRO 100/ML
VIAL (ML) SUBCUTANEOUS AT BEDTIME
Refills: 0 | Status: DISCONTINUED | OUTPATIENT
Start: 2024-05-23 | End: 2024-05-24

## 2024-05-23 RX ORDER — PIOGLITAZONE HYDROCHLORIDE 15 MG/1
1 TABLET ORAL
Refills: 0 | DISCHARGE

## 2024-05-23 RX ORDER — INSULIN HUMAN 100 [IU]/ML
10 INJECTION, SOLUTION SUBCUTANEOUS ONCE
Refills: 0 | Status: COMPLETED | OUTPATIENT
Start: 2024-05-23 | End: 2024-05-23

## 2024-05-23 RX ORDER — INSULIN HUMAN 100 [IU]/ML
5 INJECTION, SOLUTION SUBCUTANEOUS ONCE
Refills: 0 | Status: COMPLETED | OUTPATIENT
Start: 2024-05-23 | End: 2024-05-23

## 2024-05-23 RX ORDER — DEXTROSE 50 % IN WATER 50 %
50 SYRINGE (ML) INTRAVENOUS ONCE
Refills: 0 | Status: COMPLETED | OUTPATIENT
Start: 2024-05-23 | End: 2024-05-23

## 2024-05-23 RX ORDER — DEXTROSE 10 % IN WATER 10 %
125 INTRAVENOUS SOLUTION INTRAVENOUS ONCE
Refills: 0 | Status: DISCONTINUED | OUTPATIENT
Start: 2024-05-23 | End: 2024-05-24

## 2024-05-23 RX ORDER — SODIUM ZIRCONIUM CYCLOSILICATE 10 G/10G
10 POWDER, FOR SUSPENSION ORAL DAILY
Refills: 0 | Status: DISCONTINUED | OUTPATIENT
Start: 2024-05-23 | End: 2024-05-24

## 2024-05-23 RX ADMIN — ALBUTEROL 2.5 MILLIGRAM(S): 90 AEROSOL, METERED ORAL at 13:05

## 2024-05-23 RX ADMIN — Medication 100 GRAM(S): at 14:14

## 2024-05-23 RX ADMIN — Medication 50 MILLILITER(S): at 20:03

## 2024-05-23 RX ADMIN — INSULIN HUMAN 10 UNIT(S): 100 INJECTION, SOLUTION SUBCUTANEOUS at 20:03

## 2024-05-23 RX ADMIN — SODIUM ZIRCONIUM CYCLOSILICATE 10 GRAM(S): 10 POWDER, FOR SUSPENSION ORAL at 13:05

## 2024-05-23 RX ADMIN — INSULIN HUMAN 5 UNIT(S): 100 INJECTION, SOLUTION SUBCUTANEOUS at 13:04

## 2024-05-23 RX ADMIN — Medication 10 MILLIGRAM(S): at 22:55

## 2024-05-23 RX ADMIN — Medication 2: at 16:52

## 2024-05-23 RX ADMIN — Medication 3 MILLILITER(S): at 20:07

## 2024-05-23 RX ADMIN — SODIUM ZIRCONIUM CYCLOSILICATE 10 GRAM(S): 10 POWDER, FOR SUSPENSION ORAL at 22:55

## 2024-05-23 RX ADMIN — Medication 25 GRAM(S): at 13:05

## 2024-05-23 NOTE — ED ADULT TRIAGE NOTE - SPO2 (%)
89 pt c/o fall yesterday. pt reports he stood up from sitting position and then fell onto face injuring nose. Denies LOC. Pt takes daily Eliquis. Denies increased confusion/weakness. trauma alert called at [phone]

## 2024-05-23 NOTE — ED PROVIDER NOTE - CLINICAL SUMMARY MEDICAL DECISION MAKING FREE TEXT BOX
Independent interpretation of the EKG performed by MD Prabhakar    Patient found to have asymptomatic hyperkalemia with no ecg changes. No recent burns or trauma to explain hyperkalemia. Doubt drug induced, unlikely secondary to crush or thermal injury. Given CBC and BMP results doubt DKA or tumor lysis syndrome. Patient given temporizing measures to reduce potassium level. Plan to admit for further evaluation and management.    Also with chronic anemia, no signs bleeding, PRBC ordered.

## 2024-05-23 NOTE — ED PROVIDER NOTE - ATTENDING APP SHARED VISIT CONTRIBUTION OF CARE
I have reviewed and agree with the mid-level note, except as documented in my note below.    77-year-old female history of DM, MDS, p/w low hgb as outpt, denies hematemesis, coffee grounds, melena, BRBPR, anorexia, fever, weakness, dizziness, light-headedness, chest pain, palpitations, near syncope or syncope, SOB, n/v/d, hematemesis,  bleeding or other associated complaints at present. Old chart reviewed. I have reviewed and agree with the initial nursing note, except as documented in my note.    VSS, awake, alert, non-toxic appearing, no scleral icterus, oropharynx clear, mmm, no jaundice, skin rash or lesions, chest CTAB, non-labored breathing, no w/r/r, +S1/S2, RRR, no m/r/g, abdomen soft, NT, +BS, no scars, hernias or distention, no pulsatile masses or bruits appreciated, no CVA tenderness, no peripheral edema or deformities, alert, clear speech and steady gait.

## 2024-05-23 NOTE — H&P ADULT - HISTORY OF PRESENT ILLNESS
78 yo F with pmhx of MDS (follows with Dr Wade), DM, HTN, CKD, sent by Dr Wade for blood transfusion for hemoglobin of 5.6. Patient has no symptoms. No cp, sob, fever, chills, abdominal pain, nausea, vomiting, diarrhea, back pain, urinary symptoms, headache, dizziness, paresthesias, or weakness.    ED vitals and workup:  /67, HR 91, Temp 99.1F, satting 99% on room air  Labs significant for Hgb 6.2, K 6.5, VBG: pH 7.28, pCO2 43, Lactate 1.9  EKG NSR    s/p 1 pRBC, insulin/D50, calcium gluconate, and lokelma in the ED.  Admitted to medicine for anemia and hyperkalemia.  78 yo F with pmhx of MDS (follows with Dr Wade), DM, HTN, CKD presents to the ED after being sent by her oncologist Dr. Wade for evaluation for low Hgb. Pt saw Dr. Wade last week, folllowing for MDS. Pt gets her blood drawn every Wednesday by a visiting nurse, had her blood drawn yesterday. Today she was told to come to the ER due to hemoglobin of 5.6. Patient has no symptoms. No cp, sob, fever, chills, abdominal pain, nausea, vomiting, diarrhea, melena, back pain, urinary symptoms, headache, dizziness, paresthesias, or weakness.    ED vitals and workup:  /67, HR 91, Temp 99.1F, satting 99% on room air  Labs significant for Hgb 6.2, K 6.5, VBG: pH 7.28, pCO2 43, Lactate 1.9  EKG NSR    s/p 1 pRBC, insulin/D50, calcium gluconate, and lokelma in the ED.  Admitted to medicine for anemia and hyperkalemia.

## 2024-05-23 NOTE — H&P ADULT - ASSESSMENT
80 yo F with pmhx of MDS (follows with Dr Wade), DM, HTN, CKD, sent by Dr Wade for blood transfusion for hemoglobin of 5.6. Found to have K 6.5 here in the ED. Admitted for anemia and hyperkalemia.    #Anemia  - Hgb 6.2 on admission  - s/p 1 unit pRBC  - monitor H/H  - keep active T&S      #Hyperkalemia  - K 6.5 on admission  - no EKG changes  - s/p insulin/D50, calcium gluconate, and lokelma in the ED  - monitor BMP    Misc:  DVT ppx:   GI ppx: None   Diet: DASH/TLC, CC  Activity: IAT  Code: Full Code  Dispo: Acute, floor 78 yo F with pmhx of MDS (follows with Dr Wade), DM, HTN, CKD, sent by Dr Wade for blood transfusion for hemoglobin of 5.6. Found to have K 6.5 here in the ED. Admitted for anemia and hyperkalemia.    #Acute on Crhronic Anemia 2/2 MDS  - Hgb 6.2 on admission, baseline 7-8  - chemo port present, gets frequent blood transfusions  - s/p 1 unit pRBC in the ED  - monitor H/H  - keep active T&S    #Hyperkalemia  - K 6.5 on admission  - no EKG changes  - s/p insulin/D50, calcium gluconate, and lokelma in the ED  - lokelma 10g daily  - monitor BMP    #HTN  - c/w hydralazine 10mg TID, lisinopril 10mg daily, and nifedipine 90mg daily    #DM  - monitor FS  - ISS for now    Misc:  DVT ppx: SCDs  GI ppx: None   Diet: DASH/TLC, CC  Activity: IAT  Code: Full Code  Dispo: Acute, floor 78 yo F with pmhx of MDS (follows with Dr Wade), DM, HTN, CKD, sent by Dr Wade for blood transfusion for hemoglobin of 5.6. Found to have K 6.5 here in the ED. Admitted for anemia and hyperkalemia.    #Acute on Crhronic Anemia 2/2 MDS  - Hgb 6.2 on admission, baseline 7-8  - chemo port present, gets frequent blood transfusions  - s/p 1 unit pRBC in the ED  - monitor H/H  - keep active T&S    #Hyperkalemia  - K 6.5 on admission  - no EKG changes  - s/p insulin/D50, calcium gluconate, and lokelma in the ED  - lokelma 10g daily  - monitor BMP    #HTN  - c/w hydralazine 10mg TID and nifedipine 90mg daily  - hold home lisinopril in setting of hyperkalemia    #DM  - monitor FS  - ISS for now    Misc:  DVT ppx: SCDs  GI ppx: None   Diet: DASH/TLC, CC  Activity: IAT  Code: Full Code  Dispo: Acute, floor 78 yo F with pmhx of MDS (follows with Dr Wade), DM, HTN, CKD, sent by Dr Wade for blood transfusion for hemoglobin of 5.6. Found to have K 6.5 here in the ED. Admitted for anemia and hyperkalemia.    #Acute on Chronic Anemia 2/2 MDS  - Hgb 6.2 on admission, baseline 7-8  - chemo port present, gets frequent blood transfusions  - s/p 1 unit pRBC in the ED  - monitor H/H  - keep active T&S    #Hyperkalemia  - K 6.5 on admission  - no EKG changes  - s/p insulin/D50, calcium gluconate, and lokelma in the ED  - lokelma 10g daily  - monitor BMP  -Hold ACEI    #HTN  - c/w hydralazine 10mg TID and nifedipine 90mg daily  - hold home lisinopril in setting of hyperkalemia    #DM  - monitor FS  - ISS for now    Misc:  DVT ppx: SCDs  GI ppx: None   Diet: DASH/TLC, CC  Activity: IAT  Code: Full Code  Dispo: Acute, floor

## 2024-05-23 NOTE — ED ADULT NURSE NOTE - NSFALLHARMRISKINTERV_ED_ALL_ED

## 2024-05-23 NOTE — ED PROVIDER NOTE - DIFFERENTIAL DIAGNOSIS
see mdm    Independent interpretation of the EKG performed by MD Prabhakar Differential Diagnosis see mdm

## 2024-05-23 NOTE — ED PROVIDER NOTE - OBJECTIVE STATEMENT
80 yo F with pmhx of MDS (follows with Dr Wade), DM, HTN, CKD, sent by Dr Wade for blood transfusion for hemoglobin of 5.6. Patient has no symptoms. No cp, sob, fever, chills, abdominal pain, nausea, vomiting, diarrhea, back pain, urinary symptoms, headache, dizziness, paresthesias, or weakness.

## 2024-05-24 ENCOUNTER — TRANSCRIPTION ENCOUNTER (OUTPATIENT)
Age: 79
End: 2024-05-24

## 2024-05-24 ENCOUNTER — NON-APPOINTMENT (OUTPATIENT)
Age: 79
End: 2024-05-24

## 2024-05-24 VITALS — SYSTOLIC BLOOD PRESSURE: 148 MMHG | DIASTOLIC BLOOD PRESSURE: 70 MMHG | TEMPERATURE: 99 F | HEART RATE: 81 BPM

## 2024-05-24 LAB
A1C WITH ESTIMATED AVERAGE GLUCOSE RESULT: 7.2 % — HIGH (ref 4–5.6)
ALBUMIN SERPL ELPH-MCNC: 4.5 G/DL — SIGNIFICANT CHANGE UP (ref 3.5–5.2)
ALP SERPL-CCNC: 129 U/L — HIGH (ref 30–115)
ALT FLD-CCNC: 11 U/L — SIGNIFICANT CHANGE UP (ref 0–41)
ANION GAP SERPL CALC-SCNC: 13 MMOL/L — SIGNIFICANT CHANGE UP (ref 7–14)
ANION GAP SERPL CALC-SCNC: 13 MMOL/L — SIGNIFICANT CHANGE UP (ref 7–14)
ANION GAP SERPL CALC-SCNC: 14 MMOL/L — SIGNIFICANT CHANGE UP (ref 7–14)
AST SERPL-CCNC: 11 U/L — SIGNIFICANT CHANGE UP (ref 0–41)
BASOPHILS # BLD AUTO: 0.14 K/UL — SIGNIFICANT CHANGE UP (ref 0–0.2)
BASOPHILS NFR BLD AUTO: 1.7 % — HIGH (ref 0–1)
BILIRUB SERPL-MCNC: 1 MG/DL — SIGNIFICANT CHANGE UP (ref 0.2–1.2)
BUN SERPL-MCNC: 48 MG/DL — HIGH (ref 10–20)
BUN SERPL-MCNC: 51 MG/DL — HIGH (ref 10–20)
BUN SERPL-MCNC: 53 MG/DL — HIGH (ref 10–20)
CALCIUM SERPL-MCNC: 8.3 MG/DL — LOW (ref 8.4–10.4)
CALCIUM SERPL-MCNC: 9 MG/DL — SIGNIFICANT CHANGE UP (ref 8.4–10.5)
CALCIUM SERPL-MCNC: 9.3 MG/DL — SIGNIFICANT CHANGE UP (ref 8.4–10.5)
CHLORIDE SERPL-SCNC: 106 MMOL/L — SIGNIFICANT CHANGE UP (ref 98–110)
CHLORIDE SERPL-SCNC: 107 MMOL/L — SIGNIFICANT CHANGE UP (ref 98–110)
CHLORIDE SERPL-SCNC: 108 MMOL/L — SIGNIFICANT CHANGE UP (ref 98–110)
CO2 SERPL-SCNC: 16 MMOL/L — LOW (ref 17–32)
CO2 SERPL-SCNC: 19 MMOL/L — SIGNIFICANT CHANGE UP (ref 17–32)
CO2 SERPL-SCNC: 20 MMOL/L — SIGNIFICANT CHANGE UP (ref 17–32)
CREAT SERPL-MCNC: 1.6 MG/DL — HIGH (ref 0.7–1.5)
CREAT SERPL-MCNC: 1.6 MG/DL — HIGH (ref 0.7–1.5)
CREAT SERPL-MCNC: 1.9 MG/DL — HIGH (ref 0.7–1.5)
EGFR: 27 ML/MIN/1.73M2 — LOW
EGFR: 33 ML/MIN/1.73M2 — LOW
EGFR: 33 ML/MIN/1.73M2 — LOW
EOSINOPHIL # BLD AUTO: 0.26 K/UL — SIGNIFICANT CHANGE UP (ref 0–0.7)
EOSINOPHIL NFR BLD AUTO: 3.1 % — SIGNIFICANT CHANGE UP (ref 0–8)
ESTIMATED AVERAGE GLUCOSE: 160 MG/DL — HIGH (ref 68–114)
GLUCOSE BLDC GLUCOMTR-MCNC: 167 MG/DL — HIGH (ref 70–99)
GLUCOSE BLDC GLUCOMTR-MCNC: 202 MG/DL — HIGH (ref 70–99)
GLUCOSE BLDC GLUCOMTR-MCNC: 227 MG/DL — HIGH (ref 70–99)
GLUCOSE BLDC GLUCOMTR-MCNC: 97 MG/DL — SIGNIFICANT CHANGE UP (ref 70–99)
GLUCOSE SERPL-MCNC: 142 MG/DL — HIGH (ref 70–99)
GLUCOSE SERPL-MCNC: 192 MG/DL — HIGH (ref 70–99)
GLUCOSE SERPL-MCNC: 93 MG/DL — SIGNIFICANT CHANGE UP (ref 70–99)
HCT VFR BLD CALC: 22.5 % — LOW (ref 37–47)
HCT VFR BLD CALC: 24.7 % — LOW (ref 37–47)
HGB BLD-MCNC: 7.5 G/DL — LOW (ref 12–16)
HGB BLD-MCNC: 8.3 G/DL — LOW (ref 12–16)
IMM GRANULOCYTES NFR BLD AUTO: 2.2 % — HIGH (ref 0.1–0.3)
LYMPHOCYTES # BLD AUTO: 2.12 K/UL — SIGNIFICANT CHANGE UP (ref 1.2–3.4)
LYMPHOCYTES # BLD AUTO: 25.1 % — SIGNIFICANT CHANGE UP (ref 20.5–51.1)
MAGNESIUM SERPL-MCNC: 2.1 MG/DL — SIGNIFICANT CHANGE UP (ref 1.8–2.4)
MCHC RBC-ENTMCNC: 29.7 PG — SIGNIFICANT CHANGE UP (ref 27–31)
MCHC RBC-ENTMCNC: 30.2 PG — SIGNIFICANT CHANGE UP (ref 27–31)
MCHC RBC-ENTMCNC: 33.3 G/DL — SIGNIFICANT CHANGE UP (ref 32–37)
MCHC RBC-ENTMCNC: 33.6 G/DL — SIGNIFICANT CHANGE UP (ref 32–37)
MCV RBC AUTO: 88.5 FL — SIGNIFICANT CHANGE UP (ref 81–99)
MCV RBC AUTO: 90.7 FL — SIGNIFICANT CHANGE UP (ref 81–99)
MONOCYTES # BLD AUTO: 1.04 K/UL — HIGH (ref 0.1–0.6)
MONOCYTES NFR BLD AUTO: 12.3 % — HIGH (ref 1.7–9.3)
NEUTROPHILS # BLD AUTO: 4.71 K/UL — SIGNIFICANT CHANGE UP (ref 1.4–6.5)
NEUTROPHILS NFR BLD AUTO: 55.6 % — SIGNIFICANT CHANGE UP (ref 42.2–75.2)
NRBC # BLD: 0 /100 WBCS — SIGNIFICANT CHANGE UP (ref 0–0)
NRBC # BLD: 0 /100 WBCS — SIGNIFICANT CHANGE UP (ref 0–0)
PLATELET # BLD AUTO: 495 K/UL — HIGH (ref 130–400)
PLATELET # BLD AUTO: 514 K/UL — HIGH (ref 130–400)
PMV BLD: 10 FL — SIGNIFICANT CHANGE UP (ref 7.4–10.4)
PMV BLD: 10.2 FL — SIGNIFICANT CHANGE UP (ref 7.4–10.4)
POTASSIUM SERPL-MCNC: 4.9 MMOL/L — SIGNIFICANT CHANGE UP (ref 3.5–5)
POTASSIUM SERPL-MCNC: 5.3 MMOL/L — HIGH (ref 3.5–5)
POTASSIUM SERPL-MCNC: 5.5 MMOL/L — HIGH (ref 3.5–5)
POTASSIUM SERPL-SCNC: 4.9 MMOL/L — SIGNIFICANT CHANGE UP (ref 3.5–5)
POTASSIUM SERPL-SCNC: 5.3 MMOL/L — HIGH (ref 3.5–5)
POTASSIUM SERPL-SCNC: 5.5 MMOL/L — HIGH (ref 3.5–5)
PROT SERPL-MCNC: 6 G/DL — SIGNIFICANT CHANGE UP (ref 6–8)
RBC # BLD: 2.48 M/UL — LOW (ref 4.2–5.4)
RBC # BLD: 2.79 M/UL — LOW (ref 4.2–5.4)
RBC # FLD: 18.1 % — HIGH (ref 11.5–14.5)
RBC # FLD: 18.4 % — HIGH (ref 11.5–14.5)
SODIUM SERPL-SCNC: 137 MMOL/L — SIGNIFICANT CHANGE UP (ref 135–146)
SODIUM SERPL-SCNC: 138 MMOL/L — SIGNIFICANT CHANGE UP (ref 135–146)
SODIUM SERPL-SCNC: 141 MMOL/L — SIGNIFICANT CHANGE UP (ref 135–146)
WBC # BLD: 10.37 K/UL — SIGNIFICANT CHANGE UP (ref 4.8–10.8)
WBC # BLD: 8.46 K/UL — SIGNIFICANT CHANGE UP (ref 4.8–10.8)
WBC # FLD AUTO: 10.37 K/UL — SIGNIFICANT CHANGE UP (ref 4.8–10.8)
WBC # FLD AUTO: 8.46 K/UL — SIGNIFICANT CHANGE UP (ref 4.8–10.8)

## 2024-05-24 PROCEDURE — 71045 X-RAY EXAM CHEST 1 VIEW: CPT | Mod: 26

## 2024-05-24 PROCEDURE — 99223 1ST HOSP IP/OBS HIGH 75: CPT

## 2024-05-24 RX ORDER — SODIUM CHLORIDE 9 MG/ML
500 INJECTION INTRAMUSCULAR; INTRAVENOUS; SUBCUTANEOUS ONCE
Refills: 0 | Status: COMPLETED | OUTPATIENT
Start: 2024-05-24 | End: 2024-05-24

## 2024-05-24 RX ORDER — SODIUM ZIRCONIUM CYCLOSILICATE 10 G/10G
10 POWDER, FOR SUSPENSION ORAL ONCE
Refills: 0 | Status: COMPLETED | OUTPATIENT
Start: 2024-05-24 | End: 2024-05-24

## 2024-05-24 RX ORDER — EMPAGLIFLOZIN 10 MG/1
1 TABLET, FILM COATED ORAL
Refills: 0 | DISCHARGE

## 2024-05-24 RX ORDER — LISINOPRIL 2.5 MG/1
1 TABLET ORAL
Refills: 0 | DISCHARGE

## 2024-05-24 RX ORDER — SODIUM CHLORIDE 9 MG/ML
1000 INJECTION, SOLUTION INTRAVENOUS
Refills: 0 | Status: DISCONTINUED | OUTPATIENT
Start: 2024-05-24 | End: 2024-05-24

## 2024-05-24 RX ADMIN — SODIUM CHLORIDE 1000 MILLILITER(S): 9 INJECTION INTRAMUSCULAR; INTRAVENOUS; SUBCUTANEOUS at 10:13

## 2024-05-24 RX ADMIN — SODIUM ZIRCONIUM CYCLOSILICATE 10 GRAM(S): 10 POWDER, FOR SUSPENSION ORAL at 03:41

## 2024-05-24 RX ADMIN — Medication 90 MILLIGRAM(S): at 06:22

## 2024-05-24 RX ADMIN — Medication 2: at 12:59

## 2024-05-24 RX ADMIN — SODIUM ZIRCONIUM CYCLOSILICATE 10 GRAM(S): 10 POWDER, FOR SUSPENSION ORAL at 12:37

## 2024-05-24 RX ADMIN — Medication 10 MILLIGRAM(S): at 06:23

## 2024-05-24 RX ADMIN — Medication 2: at 17:32

## 2024-05-24 RX ADMIN — Medication 10 MILLIGRAM(S): at 13:07

## 2024-05-24 RX ADMIN — Medication 300 UNIT(S): at 21:55

## 2024-05-24 RX ADMIN — SODIUM CHLORIDE 50 MILLILITER(S): 9 INJECTION, SOLUTION INTRAVENOUS at 03:43

## 2024-05-24 NOTE — PATIENT PROFILE ADULT - FUNCTIONAL ASSESSMENT - BASIC MOBILITY 6.
normal (ped)... 3-calculated by average/Not able to assess (calculate score using [facility] averaging method)

## 2024-05-24 NOTE — DISCHARGE NOTE PROVIDER - CARE PROVIDER_API CALL
Kaveh Wade  Hematology  [address] [place] [id number]  Phone: [phone]  Fax: [phone]  Follow Up Time: 1 week    David Torres  Internal Medicine  [address] [place] [id number]  Phone: ()-  Fax: ()-  Follow Up Time: 1-3 days

## 2024-05-24 NOTE — DISCHARGE NOTE PROVIDER - ATTENDING DISCHARGE PHYSICAL EXAMINATION:
GENERAL: elderly F, NAD, non toxic appearing  PSYCH: no agitation, baseline mentation  NERVOUS SYSTEM:  Alert & Oriented X3   PULMONARY: symmetrical chest rise, no accessory muscle use  CARDIOVASCULAR: No murmurs, rubs, or gallops  GI:   Nondistended   EXTREMITIES:  No clubbing, cyanosis, or edema  SKIN: No rashes or lesions

## 2024-05-24 NOTE — PROGRESS NOTE ADULT - SUBJECTIVE AND OBJECTIVE BOX
1  SIMONA KUHN  79y  Female      Patient is a 79y old  Female who presents with a chief complaint of anemia and hyperkalemia (24 May 2024 09:30)      INTERVAL HPI/OVERNIGHT EVENTS: no acute events overnight. no complaints. good PO intake. urinating and had BM this AM      REVIEW OF SYSTEMS:  CONSTITUTIONAL: No fever, weight loss, or fatigue  RESPIRATORY: No cough, wheezing, chills or hemoptysis; No shortness of breath  CARDIOVASCULAR: No chest pain, palpitations, dizziness, or leg swelling  GASTROINTESTINAL: No abdominal or epigastric pain. No nausea, vomiting, or hematemesis; No diarrhea or constipation. No melena or hematochezia.  GENITOURINARY: No dysuria, frequency, hematuria, or incontinence  NEUROLOGICAL: No headaches, memory loss, loss of strength, numbness, or tremors  SKIN: No itching, burning, rashes, or lesions   MUSCULOSKELETAL: No joint pain or swelling; No muscle, back, or extremity pain  PSYCHIATRIC: No depression, anxiety, mood swings, or difficulty sleeping  All other review of systems negative    T(C): 36.6 (05-24-24 @ 07:37), Max: 36.9 (05-23-24 @ 23:51)  HR: 75 (05-24-24 @ 07:37) (75 - 95)  BP: 145/81 (05-24-24 @ 07:37) (128/69 - 159/68)  RR: 18 (05-24-24 @ 07:37) (18 - 18)  SpO2: 99% (05-24-24 @ 07:37) (98% - 100%)  Wt(kg): --Vital Signs Last 24 Hrs  T(C): 36.6 (24 May 2024 07:37), Max: 36.9 (23 May 2024 23:51)  T(F): 97.9 (24 May 2024 07:37), Max: 98.4 (23 May 2024 23:51)  HR: 75 (24 May 2024 07:37) (75 - 95)  BP: 145/81 (24 May 2024 07:37) (128/69 - 159/68)  BP(mean): --  RR: 18 (24 May 2024 07:37) (18 - 18)  SpO2: 99% (24 May 2024 07:37) (98% - 100%)    Parameters below as of 24 May 2024 07:37  Patient On (Oxygen Delivery Method): room air      PHYSICAL EXAM:  GENERAL: elderly F, NAD, non toxic appearing  PSYCH: no agitation, baseline mentation  NERVOUS SYSTEM:  Alert & Oriented X3   PULMONARY: symmetrical chest rise, no accessory muscle use  CARDIOVASCULAR: No murmurs, rubs, or gallops  GI:   Nondistended   EXTREMITIES:  No clubbing, cyanosis, or edema  SKIN: No rashes or lesions    Consultant(s) Notes Reviewed:  [x ] YES  [ ] NO    Discussed with Consultants/Other Providers [ x] YES     LABS                          8.3    8.46  )-----------( 514      ( 24 May 2024 07:40 )             24.7     05-24    141  |  108  |  48<H>  ----------------------------<  93  5.3<H>   |  20  |  1.6<H>    Ca    9.3      24 May 2024 07:40  Mg     2.1     05-24    TPro  6.0  /  Alb  4.5  /  TBili  1.0  /  DBili  x   /  AST  11  /  ALT  11  /  AlkPhos  129<H>  05-24      Urinalysis Basic - ( 24 May 2024 07:40 )    Color: x / Appearance: x / SG: x / pH: x  Gluc: 93 mg/dL / Ketone: x  / Bili: x / Urobili: x   Blood: x / Protein: x / Nitrite: x   Leuk Esterase: x / RBC: x / WBC x   Sq Epi: x / Non Sq Epi: x / Bacteria: x      POCT Blood Glucose.: 202 mg/dL (24 May 2024 11:45)    RADIOLOGY & ADDITIONAL TESTS:    Imaging Personally Reviewed:  [ ] YES  [ ] NO    HEALTH ISSUES - PROBLEM Dx:      MEDICATIONS  (STANDING):  dextrose 10% Bolus 125 milliLiter(s) IV Bolus once  dextrose 5%. 1000 milliLiter(s) (50 mL/Hr) IV Continuous <Continuous>  dextrose 5%. 1000 milliLiter(s) (100 mL/Hr) IV Continuous <Continuous>  dextrose 50% Injectable 12.5 Gram(s) IV Push once  dextrose 50% Injectable 25 Gram(s) IV Push once  glucagon  Injectable 1 milliGRAM(s) IntraMuscular once  hydrALAZINE 10 milliGRAM(s) Oral every 8 hours  insulin lispro (ADMELOG) corrective regimen sliding scale   SubCutaneous at bedtime  insulin lispro (ADMELOG) corrective regimen sliding scale   SubCutaneous three times a day before meals  NIFEdipine XL 90 milliGRAM(s) Oral daily  sodium zirconium cyclosilicate 10 Gram(s) Oral daily    MEDICATIONS  (PRN):  dextrose Oral Gel 15 Gram(s) Oral once PRN Blood Glucose LESS THAN 70 milliGRAM(s)/deciliter

## 2024-05-24 NOTE — DISCHARGE NOTE PROVIDER - HOSPITAL COURSE
78 yo F with pmhx of MDS (follows with Dr Wade), DM, HTN, CKD presents to the ED after being sent by her oncologist Dr. Wade for evaluation for low Hgb. Pt saw Dr. Wade last week, lor for MDS. Pt gets her blood drawn every Wednesday by a visiting nurse, had her blood drawn yesterday. Today she was told to come to the ER due to hemoglobin of 5.6. Patient has no symptoms. No cp, sob, fever, chills, abdominal pain, nausea, vomiting, diarrhea, melena, back pain, urinary symptoms, headache, dizziness, paresthesias, or weakness.    ED vitals and workup:  /67, HR 91, Temp 99.1F, satting 99% on room air  Labs significant for Hgb 6.2, K 6.5, VBG: pH 7.28, pCO2 43, Lactate 1.9  EKG NSR    s/p 1 pRBC, insulin/D50, calcium gluconate, and lokelma in the ED.  Admitted to medicine for anemia and hyperkalemia.     Hb improved MARCO AND hyperkalemia resolved  patient ready for dc       78 yo F with pmhx of MDS (follows with Dr Wade), DM, HTN, CKD, sent by Dr Wade for blood transfusion for hemoglobin of 5.6. Found to have K 6.5 here in the ED. Admitted for anemia and hyperkalemia.    #Acute on Chronic Anemia 2/2 MDS  - Hgb 6.2 on admission, baseline 7-8  - chemo port present, gets frequent blood transfusions  - s/p 1 unit pRBC in the ED  - monitor H/H  - keep active T&S  - Hb improved to 8.3     #Hyperkalemia/ MARCO on CKD 3a- 3b  - K 6.5 on admission , Cr 1.9   - Baseline Cr 1.4-1.6   - no EKG changes  - s/p insulin/D50, calcium gluconate, and lokelma in the ED  - lokelma 10g daily  - monitor BMP > K improved   -Hold ACEI     #HTN  - c/w hydralazine 10mg TID and nifedipine 90mg daily  - hold home lisinopril in setting of hyperkalemia    #DM  - monitor FS  - ISS for now

## 2024-05-24 NOTE — DISCHARGE NOTE PROVIDER - NSDCMRMEDTOKEN_GEN_ALL_CORE_FT
glimepiride 2 mg oral tablet: 1 tab(s) orally 2 times a day  hydrALAZINE 10 mg oral tablet: 1 tab(s) orally every 8 hours  NIFEdipine (Eqv-Procardia XL) 90 mg oral tablet, extended release: 1 tab(s) orally once a day  pioglitazone 30 mg oral tablet: 1 tab(s) orally once a day  Tradjenta 5 mg oral tablet: 1 tab(s) orally once a day

## 2024-05-24 NOTE — PATIENT PROFILE ADULT - NSPROIMPLANTSMEDDEV_GEN_A_NUR
[facility] .  Possible periorbital cellulitis.   None

## 2024-05-24 NOTE — DISCHARGE NOTE PROVIDER - PROVIDER TOKENS
PROVIDER:[TOKEN:[[id number]],FOLLOWUP:[1 week]],PROVIDER:[TOKEN:[[id number]],FOLLOWUP:[1-3 days]]

## 2024-05-24 NOTE — DISCHARGE NOTE PROVIDER - CARE PROVIDERS DIRECT ADDRESSES
[email or web address]

## 2024-05-24 NOTE — DISCHARGE NOTE PROVIDER - NSDCFUSCHEDAPPT_GEN_ALL_CORE_FT
Kaveh Wade  [facility] PreAdmits  Scheduled Appointment: 05/31/2024    [facility]  AMBCHEMO  [place]  Scheduled Appointment: 05/31/2024    Farooq Kathleen  [facility]  NEPHRO [address]  Scheduled Appointment: 07/17/2024    Sujata Celestin  [facility]  GASTRO Doc Off [address]  Scheduled Appointment: 08/09/2024

## 2024-05-24 NOTE — PROGRESS NOTE ADULT - ASSESSMENT
78 yo F with pmhx of MDS (follows with Dr Wade), DM, HTN, CKD, sent by Dr Wade for blood transfusion for hemoglobin of 5.6. Found to have K 6.5 here in the ED. Admitted for anemia and hyperkalemia.    #Acute on Chronic Anemia 2/2 MDS  - Hgb 6.2 on admission, baseline 7-8  - chemo port present, gets frequent blood transfusions  - s/p 1 unit pRBC in the ED-->7.5-->8.3  - monitor H/H  - keep active T&S    #Hyperkalemia  - K 6.5 on admission-->5.2  - no EKG changes  - s/p insulin/D50, calcium gluconate, and lokelma in the ED  - lokelma 10g daily  - monitor BMP  -Hold ACEI    # MARCO  - baseline ~1.4-1.6  - on admission 1.7->1.9-->1.6  - suspect pre-renal; giving IVF    #HTN  - c/w hydralazine 10mg TID and nifedipine 90mg daily  - hold home lisinopril in setting of hyperkalemia    #DM  - monitor FS  - ISS for now    Misc:  DVT ppx: SCDs  GI ppx: None   Diet: DASH/TLC, CC  Activity: IAT  Code: Full Code  Dispo: Acute, floor

## 2024-05-24 NOTE — DISCHARGE NOTE NURSING/CASE MANAGEMENT/SOCIAL WORK - PATIENT PORTAL LINK FT
You can access the FollowMyHealth Patient Portal offered by [facility] by registering at the following website: [email or web address]. By joining [facility]’s FollowMyHealth portal, you will also be able to view your health information using other applications (apps) compatible with our system.

## 2024-05-24 NOTE — DISCHARGE NOTE PROVIDER - NSDCCPCAREPLAN_GEN_ALL_CORE_FT
PRINCIPAL DISCHARGE DIAGNOSIS  Diagnosis: Drop in hemoglobin  Assessment and Plan of Treatment: Anemia is a condition in which the concentration of red blood cells or hemoglobin in the blood is below normal. Hemoglobin is a substance in red blood cells that carries oxygen to the tissues of the body. Anemia results in not enough oxygen reaching these tissues which can cause symptoms such as weakness, dizziness/lightheadedness, shortness of breath, chest pain, paleness, or nausea. The cause of your anemia may or may not be determined immediately. If your hemoglobin was dangerously low, you may have received a blood transfusion. Usually reactions to transfusions occur immediately but monitor yourself for any fevers, rash, or shortness of breath.  you took blood transfusion and your hemoglobin improved to 8.3   please follow with your hematoncolgist.   SEEK IMMEDIATE MEDICAL CARE IF YOU HAVE ANY OF THE FOLLOWING SYMPTOMS: extreme weakness/chest pain/shortness of breath, black or bloody stools, vomiting blood, fainting, fever, or any signs of dehydration.        SECONDARY DISCHARGE DIAGNOSES  Diagnosis: Anemia  Assessment and Plan of Treatment:     Diagnosis: MARCO (acute kidney injury)  Assessment and Plan of Treatment: You were noted to have a temporary insult to your kidney function either at the time that you arrived at the hospital or during your stay here. We have monitored your kidney function with blood work during your time here and you are at a level that no longer requires continued hospital level care. We do recommend that you follow up to continually have your kidney function checked. You can follow up with your primary care doctor, or, if recommended in the discharge paperwork, you should follow up with a kidney specialist called a nephrologist.  don't take lisinopril as it will harm the kdineys more until your PCP tells you to take it   also stop taking Jardiance until your PCP tells you to take it       Diagnosis: Hyperkalemia  Assessment and Plan of Treatment: potassium level was high   please avoid potassium contatining foods ( banana , tomato , avocado ,spinach , orange juice , lentil -- ) and follow with your PCP to repeat potassium levels in 3 days

## 2024-05-28 ENCOUNTER — NON-APPOINTMENT (OUTPATIENT)
Age: 79
End: 2024-05-28

## 2024-05-29 ENCOUNTER — NON-APPOINTMENT (OUTPATIENT)
Age: 79
End: 2024-05-29

## 2024-05-30 DIAGNOSIS — D46.9 MYELODYSPLASTIC SYNDROME, UNSPECIFIED: ICD-10-CM

## 2024-05-30 DIAGNOSIS — E11.22 TYPE 2 DIABETES MELLITUS WITH DIABETIC CHRONIC KIDNEY DISEASE: ICD-10-CM

## 2024-05-30 DIAGNOSIS — E87.5 HYPERKALEMIA: ICD-10-CM

## 2024-05-30 DIAGNOSIS — N17.9 ACUTE KIDNEY FAILURE, UNSPECIFIED: ICD-10-CM

## 2024-05-30 DIAGNOSIS — N18.32 CHRONIC KIDNEY DISEASE, STAGE 3B: ICD-10-CM

## 2024-05-30 DIAGNOSIS — D64.9 ANEMIA, UNSPECIFIED: ICD-10-CM

## 2024-05-30 DIAGNOSIS — D63.0 ANEMIA IN NEOPLASTIC DISEASE: ICD-10-CM

## 2024-05-30 DIAGNOSIS — I12.9 HYPERTENSIVE CHRONIC KIDNEY DISEASE WITH STAGE 1 THROUGH STAGE 4 CHRONIC KIDNEY DISEASE, OR UNSPECIFIED CHRONIC KIDNEY DISEASE: ICD-10-CM

## 2024-05-31 ENCOUNTER — LABORATORY RESULT (OUTPATIENT)
Age: 79
End: 2024-05-31

## 2024-05-31 ENCOUNTER — APPOINTMENT (OUTPATIENT)
Age: 79
End: 2024-05-31

## 2024-05-31 LAB
HCT VFR BLD CALC: 21.8 %
HGB BLD-MCNC: 7.3 G/DL
MCHC RBC-ENTMCNC: 29.6 PG
MCHC RBC-ENTMCNC: 33.5 G/DL
MCV RBC AUTO: 88.3 FL
PLATELET # BLD AUTO: 394 K/UL
PMV BLD: 11.4 FL
RBC # BLD: 2.47 M/UL
RBC # FLD: 18.8 %
WBC # FLD AUTO: 10.51 K/UL

## 2024-06-19 ENCOUNTER — NON-APPOINTMENT (OUTPATIENT)
Age: 79
End: 2024-06-19

## 2024-06-21 ENCOUNTER — OUTPATIENT (OUTPATIENT)
Dept: OUTPATIENT SERVICES | Facility: HOSPITAL | Age: 79
LOS: 1 days | End: 2024-06-21
Payer: MEDICARE

## 2024-06-21 ENCOUNTER — APPOINTMENT (OUTPATIENT)
Age: 79
End: 2024-06-21

## 2024-06-21 VITALS — TEMPERATURE: 98 F | DIASTOLIC BLOOD PRESSURE: 72 MMHG | SYSTOLIC BLOOD PRESSURE: 132 MMHG | HEART RATE: 99 BPM

## 2024-06-21 DIAGNOSIS — D46.9 MYELODYSPLASTIC SYNDROME, UNSPECIFIED: ICD-10-CM

## 2024-06-21 DIAGNOSIS — Z98.890 OTHER SPECIFIED POSTPROCEDURAL STATES: Chronic | ICD-10-CM

## 2024-06-21 PROCEDURE — P9040: CPT

## 2024-06-21 PROCEDURE — 96401 CHEMO ANTI-NEOPL SQ/IM: CPT

## 2024-06-21 PROCEDURE — 36430 TRANSFUSION BLD/BLD COMPNT: CPT

## 2024-06-21 RX ORDER — LUSPATERCEPT 25 MG/1
110 INJECTION, POWDER, LYOPHILIZED, FOR SOLUTION SUBCUTANEOUS ONCE
Refills: 0 | Status: COMPLETED | OUTPATIENT
Start: 2024-06-21 | End: 2024-06-21

## 2024-06-21 RX ADMIN — LUSPATERCEPT 110 MILLIGRAM(S): 25 INJECTION, POWDER, LYOPHILIZED, FOR SOLUTION SUBCUTANEOUS at 11:50

## 2024-06-22 DIAGNOSIS — D46.9 MYELODYSPLASTIC SYNDROME, UNSPECIFIED: ICD-10-CM

## 2024-07-04 ENCOUNTER — NON-APPOINTMENT (OUTPATIENT)
Age: 79
End: 2024-07-04

## 2024-07-05 ENCOUNTER — APPOINTMENT (OUTPATIENT)
Age: 79
End: 2024-07-05

## 2024-07-05 ENCOUNTER — OUTPATIENT (OUTPATIENT)
Dept: OUTPATIENT SERVICES | Facility: HOSPITAL | Age: 79
LOS: 1 days | End: 2024-07-05
Payer: MEDICARE

## 2024-07-05 VITALS
HEART RATE: 91 BPM | SYSTOLIC BLOOD PRESSURE: 117 MMHG | RESPIRATION RATE: 14 BRPM | TEMPERATURE: 98 F | DIASTOLIC BLOOD PRESSURE: 66 MMHG

## 2024-07-05 DIAGNOSIS — Z98.890 OTHER SPECIFIED POSTPROCEDURAL STATES: Chronic | ICD-10-CM

## 2024-07-05 DIAGNOSIS — N17.9 ACUTE KIDNEY FAILURE, UNSPECIFIED: ICD-10-CM

## 2024-07-05 PROCEDURE — 36415 COLL VENOUS BLD VENIPUNCTURE: CPT

## 2024-07-05 PROCEDURE — P9040: CPT

## 2024-07-05 PROCEDURE — 36430 TRANSFUSION BLD/BLD COMPNT: CPT

## 2024-07-05 PROCEDURE — 86923 COMPATIBILITY TEST ELECTRIC: CPT

## 2024-07-06 DIAGNOSIS — N17.9 ACUTE KIDNEY FAILURE, UNSPECIFIED: ICD-10-CM

## 2024-07-11 ENCOUNTER — APPOINTMENT (OUTPATIENT)
Age: 79
End: 2024-07-11
Payer: MEDICARE

## 2024-07-11 ENCOUNTER — OUTPATIENT (OUTPATIENT)
Dept: OUTPATIENT SERVICES | Facility: HOSPITAL | Age: 79
LOS: 1 days | End: 2024-07-11
Payer: MEDICARE

## 2024-07-11 VITALS — SYSTOLIC BLOOD PRESSURE: 140 MMHG | HEART RATE: 76 BPM | TEMPERATURE: 98 F | DIASTOLIC BLOOD PRESSURE: 72 MMHG

## 2024-07-11 VITALS — SYSTOLIC BLOOD PRESSURE: 170 MMHG | DIASTOLIC BLOOD PRESSURE: 86 MMHG | HEART RATE: 76 BPM | TEMPERATURE: 97.7 F

## 2024-07-11 DIAGNOSIS — Z98.890 OTHER SPECIFIED POSTPROCEDURAL STATES: Chronic | ICD-10-CM

## 2024-07-11 DIAGNOSIS — D46.9 MYELODYSPLASTIC SYNDROME, UNSPECIFIED: ICD-10-CM

## 2024-07-11 DIAGNOSIS — N17.9 ACUTE KIDNEY FAILURE, UNSPECIFIED: ICD-10-CM

## 2024-07-11 PROCEDURE — 99213 OFFICE O/P EST LOW 20 MIN: CPT

## 2024-07-11 PROCEDURE — 36415 COLL VENOUS BLD VENIPUNCTURE: CPT

## 2024-07-11 PROCEDURE — 86923 COMPATIBILITY TEST ELECTRIC: CPT

## 2024-07-11 PROCEDURE — 96401 CHEMO ANTI-NEOPL SQ/IM: CPT

## 2024-07-11 PROCEDURE — 96372 THER/PROPH/DIAG INJ SC/IM: CPT

## 2024-07-11 RX ORDER — LUSPATERCEPT 25 MG/1
110 INJECTION, POWDER, LYOPHILIZED, FOR SOLUTION SUBCUTANEOUS ONCE
Refills: 0 | Status: COMPLETED | OUTPATIENT
Start: 2024-07-11 | End: 2024-07-11

## 2024-07-11 RX ORDER — CYANOCOBALAMIN (VITAMIN B-12) 1000MCG/ML
1000 VIAL (ML) INJECTION ONCE
Refills: 0 | Status: COMPLETED | OUTPATIENT
Start: 2024-07-11 | End: 2024-07-11

## 2024-07-11 RX ADMIN — Medication 1000 MICROGRAM(S): at 15:31

## 2024-07-11 RX ADMIN — LUSPATERCEPT 110 MILLIGRAM(S): 25 INJECTION, POWDER, LYOPHILIZED, FOR SOLUTION SUBCUTANEOUS at 15:31

## 2024-07-24 ENCOUNTER — APPOINTMENT (OUTPATIENT)
Dept: NEPHROLOGY | Facility: CLINIC | Age: 79
End: 2024-07-24
Payer: MEDICARE

## 2024-07-24 VITALS
SYSTOLIC BLOOD PRESSURE: 130 MMHG | DIASTOLIC BLOOD PRESSURE: 55 MMHG | HEIGHT: 62 IN | TEMPERATURE: 97.7 F | WEIGHT: 132 LBS | OXYGEN SATURATION: 84 % | HEART RATE: 143 BPM | BODY MASS INDEX: 24.29 KG/M2

## 2024-07-24 DIAGNOSIS — N18.32 CHRONIC KIDNEY DISEASE, STAGE 3B: ICD-10-CM

## 2024-07-24 PROCEDURE — 99214 OFFICE O/P EST MOD 30 MIN: CPT

## 2024-07-24 NOTE — PHYSICAL EXAM
[General Appearance - Alert] : alert [General Appearance - In No Acute Distress] : in no acute distress [Sclera] : the sclera and conjunctiva were normal [PERRL With Normal Accommodation] : pupils were equal in size, round, and reactive to light [Extraocular Movements] : extraocular movements were intact [Outer Ear] : the ears and nose were normal in appearance [Oropharynx] : the oropharynx was normal [Neck Appearance] : the appearance of the neck was normal [Neck Cervical Mass (___cm)] : no neck mass was observed [Jugular Venous Distention Increased] : there was no jugular-venous distention [Thyroid Diffuse Enlargement] : the thyroid was not enlarged [Thyroid Nodule] : there were no palpable thyroid nodules [Auscultation Breath Sounds / Voice Sounds] : lungs were clear to auscultation bilaterally [Heart Rate And Rhythm] : heart rate was normal and rhythm regular [Heart Sounds] : normal S1 and S2 [Heart Sounds Gallop] : no gallops [Heart Sounds Pericardial Friction Rub] : no pericardial rub [Full Pulse] : the pedal pulses are present [Edema] : there was no peripheral edema [Bowel Sounds] : normal bowel sounds [Abdomen Soft] : soft [Abdomen Tenderness] : non-tender [Abdomen Mass (___ Cm)] : no abdominal mass palpated [No CVA Tenderness] : no ~M costovertebral angle tenderness [No Spinal Tenderness] : no spinal tenderness [Abnormal Walk] : normal gait [Nail Clubbing] : no clubbing  or cyanosis of the fingernails [Musculoskeletal - Swelling] : no joint swelling seen [Motor Tone] : muscle strength and tone were normal [Skin Color & Pigmentation] : normal skin color and pigmentation [Skin Turgor] : normal skin turgor [] : no rash [Motor Exam] : the motor exam was normal [No Focal Deficits] : no focal deficits [Oriented To Time, Place, And Person] : oriented to person, place, and time [Impaired Insight] : insight and judgment were intact [Affect] : the affect was normal [FreeTextEntry1] : 3/6 + DILCIA of the right 2nd ICS

## 2024-07-24 NOTE — ASSESSMENT
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, RAAS blocker use and from pRBC transfusions K improved 6.0->5.7, will monitor - continue Kayexalate 15gm biweekly  #) Essential HTN: BP uncontrolled in clinic today - advised salt restrictive diet of <2.4gm daily - continue to monitor home BP readings and report in future clinic appointments - continue use of Lisinopril 10mg daily  #) Proteinuria Last UPCR noted 1.7 gm/gm Cr, not repeated prior to today - Lisinopril likely caused her hyperkalemia which necessitated hospitalization, but she requires RAAS blocker use to improve her proteinuria and hyperkalemia can be managed as arises  #) NIDDM: A1c uncontrolled on last check, 8.5% was noted - Continue current use of Tradjenta 5mg daily, Pioglitazone 30mg daily, Glimepiride 2mg daily and Jardiance 10mg daily

## 2024-07-24 NOTE — HISTORY OF PRESENT ILLNESS
[FreeTextEntry1] : Sophia Kennedy is a 79 yo F with history of NIDDM, HTN, myelodysplastic syndrome (on Revlimid), anemia (on procrit injections with HemOnc, more recently requiring frequent pRBC infusions for severe anemia), prior episodes of MARCO, seen in follow-up today for CKD.  She reports nausea and fatigue today, similar to feelings previously when she has low Hb values that require transfusion therapy.  NSAIDS use: denies use  Home BP: no abnormal readings noted  Home FSBS:  no abnormal readings noted  LUTS: denies LUTS, foamy urine or hematuria  Uremic Symptoms: no pruritis, metallic taste, new onset weakness, dysphagia, poor appetite, or tremors noted  FamHx CKD/RRT:  denies  Personal History of CKD/RRT:  denies

## 2024-07-25 LAB
CREAT SPEC-SCNC: 36 MG/DL
CREAT SPEC-SCNC: 38 MG/DL
CREAT/PROT UR: 0.9 RATIO
MICROALBUMIN 24H UR DL<=1MG/L-MCNC: 20.8 MG/DL
MICROALBUMIN/CREAT 24H UR-RTO: 575 MG/G
PROT UR-MCNC: 33 MG/DLG/24H

## 2024-07-30 ENCOUNTER — NON-APPOINTMENT (OUTPATIENT)
Age: 79
End: 2024-07-30

## 2024-08-01 ENCOUNTER — APPOINTMENT (OUTPATIENT)
Age: 79
End: 2024-08-01
Payer: MEDICARE

## 2024-08-01 ENCOUNTER — OUTPATIENT (OUTPATIENT)
Dept: OUTPATIENT SERVICES | Facility: HOSPITAL | Age: 79
LOS: 1 days | End: 2024-08-01
Payer: MEDICARE

## 2024-08-01 VITALS
DIASTOLIC BLOOD PRESSURE: 75 MMHG | HEIGHT: 62 IN | SYSTOLIC BLOOD PRESSURE: 136 MMHG | BODY MASS INDEX: 25.03 KG/M2 | TEMPERATURE: 98.2 F | HEART RATE: 101 BPM | OXYGEN SATURATION: 99 % | WEIGHT: 136 LBS | RESPIRATION RATE: 16 BRPM

## 2024-08-01 DIAGNOSIS — D64.9 ANEMIA, UNSPECIFIED: ICD-10-CM

## 2024-08-01 DIAGNOSIS — Z98.890 OTHER SPECIFIED POSTPROCEDURAL STATES: Chronic | ICD-10-CM

## 2024-08-01 DIAGNOSIS — Z51.11 ENCOUNTER FOR ANTINEOPLASTIC CHEMOTHERAPY: ICD-10-CM

## 2024-08-01 DIAGNOSIS — D46.9 MYELODYSPLASTIC SYNDROME, UNSPECIFIED: ICD-10-CM

## 2024-08-01 DIAGNOSIS — E53.8 DEFICIENCY OF OTHER SPECIFIED B GROUP VITAMINS: ICD-10-CM

## 2024-08-01 DIAGNOSIS — D75.839 THROMBOCYTOSIS, UNSPECIFIED: ICD-10-CM

## 2024-08-01 PROCEDURE — 99215 OFFICE O/P EST HI 40 MIN: CPT

## 2024-08-01 PROCEDURE — G2211 COMPLEX E/M VISIT ADD ON: CPT

## 2024-08-01 PROCEDURE — 96401 CHEMO ANTI-NEOPL SQ/IM: CPT

## 2024-08-01 PROCEDURE — P9040: CPT

## 2024-08-01 PROCEDURE — 36430 TRANSFUSION BLD/BLD COMPNT: CPT

## 2024-08-01 RX ORDER — LUSPATERCEPT 25 MG/1
110 INJECTION, POWDER, LYOPHILIZED, FOR SOLUTION SUBCUTANEOUS ONCE
Refills: 0 | Status: ACTIVE | OUTPATIENT
Start: 2024-08-01

## 2024-08-01 RX ADMIN — LUSPATERCEPT 110 MILLIGRAM(S): 25 INJECTION, POWDER, LYOPHILIZED, FOR SOLUTION SUBCUTANEOUS at 14:48

## 2024-08-02 DIAGNOSIS — D64.9 ANEMIA, UNSPECIFIED: ICD-10-CM

## 2024-08-02 NOTE — PHYSICAL EXAM
[Ambulatory and capable of all self care but unable to carry out any work activities] : Status 2- Ambulatory and capable of all self care but unable to carry out any work activities. Up and about more than 50% of waking hours [Normal] : affect appropriate [de-identified] : pallor noted [de-identified] : Lower extremity mild edema [de-identified] : Right chest port-A-cath intact, no erythema or tenderness noted.

## 2024-08-02 NOTE — PHYSICAL EXAM
[Ambulatory and capable of all self care but unable to carry out any work activities] : Status 2- Ambulatory and capable of all self care but unable to carry out any work activities. Up and about more than 50% of waking hours [Normal] : affect appropriate [de-identified] : pallor noted [de-identified] : Lower extremity mild edema [de-identified] : Right chest port-A-cath intact, no erythema or tenderness noted.

## 2024-08-02 NOTE — REVIEW OF SYSTEMS
[Fatigue] : fatigue [Negative] : Allergic/Immunologic [Cough] : cough [Recent Change In Weight] : ~T no recent weight change [Shortness Of Breath] : no shortness of breath [FreeTextEntry2] : Fatigue is unchanged from baseline. not feeling well [FreeTextEntry6] : Dry cough for 2 weeks

## 2024-08-02 NOTE — HISTORY OF PRESENT ILLNESS
[de-identified] : This is a 75 year old  female with history of MDS. She's was previously treated with Revlimid 5 mg, 2 weeks on, 1 week off.  \par  She is also on Procrit.\par   She underwent colonoscopy (2/2017)  Results noted no colitis, only hyperplastic polyp noted. \par   \par   [de-identified] : She missed on appointment for procrit last week. She starts her next cycle on 6/25/18 C/o back pain radiating down her left which occurred when she bent to  something. No change in diarrhea.  7/31/18: Doing well. On Revlimid for more than 2yrs, 5 mg 2 weeks on 1 week off. She will be starting week on tonight.  C/o diarrhea. On Imodium 2 pills AM and 2 pills PM. Doesn't help much with diarrhea.  C/o nausea, abdominal pain.    Colonoscopy in 2016 was normal.  Did not have blood transfusion for over 2 years.  On procrit [id number] weekly. Missed last week on 7/24/18 as she was out of town. She has been non compliant with weekly Procrit. Mammogram in 2017 was normal.   9/11/18 pt is here for follow up. She feels the lomotil helps with the diarrhea. She was away for a few weeks and missed her procrit injections. No fever, abdominal  discomfort has been less since since use of lomotil. She started a new cycle 2 days ago.  10/2/18: She will start Revlimid tonight (week on). 2 weeks on, 1 week off.  On ASA 81mg.  Denies fever, nausea, vomiting, chest pain, SOB, abdominal pain, and bladder problems. C/o diarrhea.  S/p 2 units PRBC transfusion on 9/25/18.  On Procrit 82483 units weekly. Not compliant every week.  C/o intermittent dizziness.   10/31/18 Pt is here for follow up. C/O significant fatigue. Continues to have diarrhea, takes imodium and lomotil as needed. C/o dry cough, no fever. Cough started a month ago. It is worse in the mornings however over last week it has been constant all day. No chest pain. She was found to have low B12 levels and was started on B12 injections.  11/27/18: Doing well. Hospitalized for 3 days for cellulitis/abcess of the back s/p drainage and on Abx currently. Developed 3 days after Mg infusion as per pt.  Denies nausea, vomiting, chest pain, SOB, abdominal pain, bowel and bladder problems. This is the week on Revlimid (week 1). S/p 1 unit PRBC transfusion in the hospital.  On Procrit weekly   12/27/18: Doing well. No major complaints. Denies fever, nausea, vomiting, chest pain, SOB, abdominal pain, and bladder problems. On Revlimid 5 mg 2 weeks on 1 week off. This is the week off. She will start the week on sunday (12/30/18). Due for Procrit 77241 units today.  Still has diarrhea. 4-5bm/day. On monthly B12 injections.   1/30/19: Patient here for follow up for MDS.  She is taking Revlimid 5 mg, 2 weeks on, 1 week off.  She will complete this current cycle on Saturday.  She has no new complaints today.  Patient denies cough, shortness of breath, denies fever, denies bone pain.  03/04/2019 Patient is here for a follow up visit. She complaints of severe pain in her left shoulder and has had abscess drained 2 weeks ago. However the pain is worse and she has purulent discharge on examination. She also has Nasal sores that are painful. Culture from 11/2018 showed MRSA.  Denies Fever.  She also complaints of swelling in her right leg. Not tender and not erythematous and negative Gong;s sign.  Her diarrhea with Revlimid is ongoing and Imodium and Lomotil helps but not everyday.  She is on 60,000 units of procrit weekly and Revlimid 5 mg 2 weeks on 1 week off.   4/23/19 Pt is here for follow up. She feels well. The nasal sores have improved with bactroban The abscess on left shoulder has resolved. However she has a new one on the middle of her back. No fever. Pt has been on procrit 32288 units weekly, however she missed a dose in between.  5/8/19 pt is here for follow up. no new complaints. feels well. Her skin abscess has resolved. she has been unable to go to ID, as she could not get an appt. No bleeding. She is compliant with revlimid.  6/6/19 Pt is here for follow up. no new complaints  Feels well. Is on week 2 of her Revlimid cycle.  No transfusions since last visit  7/17/19 Pt is here for follow up. C/O fatigue. Was away for a few days two weeks ago, but missed treatment with procrit for 2 weeks. Is complaint with Revlimid 2 weeks on 1 week off. Diarrhea is a little improved.  8/14/19 Patient here for follow up visit, feeling well.  Although she is complaining of some pain at the left scapula area recently.  Patient denies cough, shortness of breath, denies fever, denies other bone pain.  She is off Revlimid  this week, due to restart on Monday.  She is scheduled for Procrit and Vitamin B12 injection today.  She plans to leave the country tomorrow to visit her mother who is ill.  She will return in about 10 days.  9/11/19 Pt is here for follow up. She was away for 3 weeks, out of which she took procrit for 2 weeks in [place]. She missed last week's dose. She had CBCs in [place] which showed Hgb of 8.9 She feels well. She still getting diarrhea. She has been using lomotil with very minimal relief. She started a new cycle of Revlimid 4 days ago.  10/3/19 Patient here for follow up visit, feeling fairly well.   Patient denies cough, shortness of breath, denies fever, denies other bone pain.  She remains on Revlimid.  She is scheduled for Procrit and Vitamin B12 injection today.  10/24/19 Pt is here for follow up. Feels well. No SOB, fatigue. Compliant with procrit and Revl;imid. Remains on ASa. Diarrhea is unchanged. Pt takes imodium as needed.  11/14/19 Pt is here for follow up. No new complaints. Starts a new cycle of Revlimid today. Diarrhea is same as before, no rectal bleeding. No fever.  12/5/19 Pt is here for follow up. No new complaints. Denies feeling weaker than usual. No fever, SOB. No change in frequency of diarrhea. No pain. Will start next cycle of Revlimid in 3 days.   !/16/20 Pt was recently DCed from [facility] 3 days ago after being treated for pneumonia and MARCO. She is on po Levaquin. She restarted Revlmid 3 days ago. She is feeling better now. No fever. No SOB, Chest pain and back pain has resolved. Pt has a cough, no expectoration. She also recd a unit of PRBCs last week in the hospital   1/30/20 Patient here for follow up visit, feeling well.  She has no new complaints. Cough is almost gone completely.  Patient denies shortness of breath, denies fever, denies bone pain.  Her appetite is ok, weight is stable.  She is due to restart Revlimid on Monday.  02/20/20 Pt is here for follow up, feeling well. Offers no new complaints. Denies SOB, fever, chills, weight loss, CP, cough.  Currently off week of Revlimid 5 mg. Restarts on Monday. Has procrit 80,000 units today. Next b 12 injection due in 2 weeks  Did not get chest Xray CBC reviewed WBC 3.1, h/h 8.3/25%, plt 386, neutrophils 1.29  3/12/20 Pt is here for follow up. She took Revlimid for 2 weeks and then has been off for one week although she was advised to take it daily without break. No SOB, Cough, fever. Has mild fatigue.  04/02/2020 SIMONA KUHN a 75 year F is here today for follow up visit of MDS. Denies fever, chills, cough,night sweats, weight loss.  Denies bleeding or bruising. Pt receives procrit 80,000 units weekly and B12 IM monthly.  Continued Revlimid 5 mg daily x 3 weeks, has 1 dose left tonight.  CBC reviewed: WBC 3.2, H/H 8.1/25.2, neutrophils 1.6, PLT 72  4/20/2020: The patient is here for follow up . She has no complaints of fever, chills, dizziness , chest pain and shortness of breath . She is still with diarrhea , up to 10 watery BMs per day, responds poorly to imodium and lomotil. She is on Revlimid 5 mg daily , took last dose yesterday night. Her last Procrit was on April 13.   5/26/20 Pt is here for follow up. She is feeling well. Denies SOB, chest pain, fever. Continues to have diarrhea although she is off of Revlmid. Thinks it may due to diabetic meds. Wants to discuss BM bx results  6/22/20 Pt is here for follow up. Feels well. Denies any fever, N, V, D Continues to have diarrhea, she will talk to her PCP about changing metformin for DM. Has been needing PRBCs 1 unit each month  7/20/20 Pt is here for follow up. No new complaints. Has been feeling well. No SOB, CP.  No N, V, D. She has recd 2 units of PRBCs since May 20.  8/17/20 Pt is here for a follow up visit. She is feeling well. No new symptoms. No N, V, D. No bruising or bleeding. She continues to need PRBCs every 2-3 weeks.  8/31/20 Pt is here for follow up. She is feeling well. No N, V, D. She is tolerating the hydrea well. No SOB, CP No bruising ior bleeding  9/8/20 Pt is here for follow up. She had been contacted by the [facility] last week to advise her to stop the hydrea. Pt did not check her messages and continued with Hydrea. No fever, N, V, bleeding. Saw Dr Ferrell last week.  9/14/20 Pt is here for folow up. Besides her usual complaint of diarrhea she is feeling well. Recd 1 unit PRBC last week. Has stopped Hydrea. No fever, mouth sores.  9/29/20 Pt is here for folow up. No new complaints. Is seeing GI for diarrhea net week. No fever, night sweats. REcd 3 units of PRBC this month  10/13/20 Pt is here for follow up. No new complaints. Has not needed a transfusion since 3 weeks ago. Continues to have diarrhea, waiting to be seen by GI  10/26/20 Pt is here for follow up. C/O feeling fatigued. Has CLARK. No nausea or vomiting. No fever. Diarrhea has improved a little. She is no longer on Metformin  11/9/20 Pt is here for follow up. Feels well. Denies SOB, CP, fatigue  12/7/20- Patient is for follow up and is due for cycle 9 of Vidaza.  She still has loose BM sometimes 10 times a day and was seeing Dr. Corey from GI- did not follow up recently and is unable to get an appt with him. She has lost about 10 lbs since September. No N/V. No fever or chills. No CP/SOB. She has been getting PRBC transfusion every 3 weeks now  01/04/20 Pt presented today for f/u visit . She is s/p 8 cycles of vidaza and was started on Luspatercept in Dec , 2020 . So far she received 1 injection , she is due for 2nd injection today . Adverse effect profile was discussed with her in detail , she reports having some dizziness and weakness after first injection . She received blood transfusion last wk . Blood work from today reviewed as well and counts are adequate . She denies any fevers,  chills,  or any new symptoms .   1/25/21 Pt is here for follow up. C/O diarrhea, otherwise feeling better. Has not needed a transfusion since 12/29 2/16/21 Pt is here for follow up. Needed transfusion on 2/8/21. Continues to C/o fatigue. Diarrhea remains the same, has not made GI appt as yet.  3/8/21 Pt is here for follow up. Feels better today. Recd 1 unit PRBCs last week. Diarrhea is same as before. has an appt with GI next week. No fever  3/30/21 Pt is here for follow up. Feels better. Last transfusion was on 3/2/21 Saw GI and was started on cholestyramine and metformin was DCED with resolution of diarrhea. She denies any SOB, Fatigue is better  4/20/2021 Pt is here to f/u for MDS She is s/p Luspatercept cycle #6 She is compliant with Aspirin She feels better today, appetite is good She denies shortness of breath, fatigue, or weakness  She c/o of diarrhea but it has improved since she was started on Cholestyramine. Stool is soft and less in frequency She received COVID vaccine x 2 doses  5/11/21 pt is here for follow up. Feels the same with fatigue, diarrhea. No SOB  6/21/21 Pt is here for follow up. Feels well. No SOB, CP, N< V. Diarrhea is better controlled now. Last PRBC transfusion was 2 weeks ago.  7/19/21 Pt is here for follow up. Feels a little tired, last transfusion was 6/1/21. No bleeding, fever, SOB  8/10/2021 Patient is here to follow up for her MDS. She is on Luspatercept, tolerating well. She feels well today, the appetite is good. She denies shortness of breath, fatigue, fever, night sweats, abdominal pain, arthralgias/myalgias.  8/31/21 Pt is here for follow up. C/O feeling very fatigued. No bleeding. No fever.  9/21/2021 Patient is here to follow up for MDS. She is on Luspatercept and Retacrit, tolerating well. She gets blood transfusion as needed for Hgb <7 gm/dL She is c/o of fatigue, no shortness of breath, dizziness, chills or lightheadedness. She denies melena or hematochezia. Her appetite is good, no nausea/vomiting.  10/28/2021 Patient is here to follow up for MDS. She is on Luspatercept and Retacrit, tolerating well. She gets blood transfusion to keep Hgb > 7 gm/dL. She is c/o of chronic fatigue, no shortness of breath, dizziness, chills or lightheadedness. She denies melena or hematochezia. Her appetite is good, no nausea/vomiting. She c/o of severe diarrhea, 10-12x/day watery stool while on Imodium in the last 2 weeks. Last colonoscopy was in 2016, benign as per patient.  11/18/21 Pt is here for follow up. C/O fatigue. No SOB, CP  12/9/21 Pt is here for follow up. Feels better today. Recd 2 units last week in ER. Planning to go to [place] for over a week and does not want to miss her procrit dose. Diarrhea is stable,  12/23/21 Pt is here for follow up. Feels better. No SOB, CP. Going to [place] tomorrow. Has not been able to get Procrit injection from the pharmacy yet d/t insurance issues  2/3/22 Pt is here for follow up. Feeling same as usual. No SOB, CP. Last transfusion was 2 weeks ago in ER> No bleeding reported  3/3/22 Pt is here for follow up. C/O fatigue. Had black stools X2  3/30/22 Pt is feeling well. Here for treatment and BM biopsy  4/21/22 Pt is here fir follow up. Was in ER last week. Was give 2 units of PRBCs. Pt feels less tired today. Wants to discuss BM rslts  5/12/22 Pt is here today for follow up visit. Pt c/o feeling tired.  Hgb=6.9.  One unit transfusion scheduled.  6/23/22 Pt is here for follow up. Feels well today. No SOB, chest pain. Has not made appt with [facility]  7/14/22 Pt is here for follow up for lowrisk myelodysplastic syndrome. She came in late today because she was not feeling well this morning- dizziness & weakness. She denies SOB, CP.  9/22/22 Pt is here for follow up. C/O fatigue. was in the ER 3 weeks ago and recd 2 U PRBCS  11/10/22 Patient is here to follow up for MDS/MPN. S/P 2 units pRBC last weekend, feeling much better now. She denies shortness of breath, chest pain or headache.  1/5/2023 Patient is here to follow up for MDS/MPN, accompanied by spouse and daughter. She is feeling much better after she received blood transfusion on 12/29/22. She denies shortness of breath, chest pain, dizziness or headache. Pt fell on 12/28/22 when she slipped while getting down the stairs and landed on her right knee. Now c/o of severe right knee pain with limited ROM. She has not been evaluated since the incident happened. She is taking Aleeve with minimal relief.  1/19/2023 Patient is here to follow up for MDS/MPN and treatment. She feel fine, has fatigue not worse than her baseline. She denies shortness of breath, chest pain, dizziness, headache or bleeding. She was evaluated at the ER on 1/5/2023 for her fall and imaging showed no fracture, except for mild joint effusion.  2/9/23 Patient presents for follow up today. She complains of light-headedness. Her BP is elevated to 180/70 mmHg. She does not take any meds and says she is not diagnosed with HTN. She is due for Procrit & Luspatercept today. Reports her energy level is at baseline. No other complaints today. Her CMP showed serum glucose > 600 mg/dl, she was sent to ER with the transport.  3/2/32 Patient here for follow up for MDS. She is scheduled for next luspatercept injection. She is status post port placement yesterday, so she has some discomfort at chest area from that. She feels somewhat weak, appetite is adequate, weight is stable. She is under the care of endocrinologist to try to get her DM under better control. She was recommended to take ASA 81 mg when she was discharged from the hospital.  3/23/23 Patient here for follow up for MDS. She is scheduled for next Luspatercept injection. She is status post port placement She feels somewhat weak, appetite is adequate, weight is stable. She is under the care of endocrinologist to try to get her DM under better control. She was recommended to take ASA 81 mg when she was discharged from the hospital.  4/13/23 Pt is here for follow up. Daughter is with her. Neuro eval has not been completed yet. No bleeding, SOB, pain recd 2 Units PRBCs 2 weeks ago  5/4/23 Pt is here for follow up. Pt C/O not feeling well today. Denies pain. Has dizziness. Fasting bld sugar was over 350. She is not on insulin. Denies diarrhea, N, SOB, CP  5/25/23 Pt is here for follow up, accompanied by formal caregiver. She feels well except for fatigue which is unchanged from baseline. She denies chest pain, shortness of breath, bleeding or decreased urinary output. She states that she saw nephro last week and follows up with her PCP for her diabetes.  6/15/23 Pt is here for follow up. Feels weak, No SOB, CP  8/3/23 Pt is here for follow up Feels fatigued. Has. chronic diarrhea which has been attributed to her diabetes meds  8/30/23 Pt is here for follow C/O diarrhea and fatigue  11/7/2023 Patient is here for follow up. She is s/p 1 unit pRBC on 10/26/23. She feels fatigue, unchanged from her baseline. She denies shortness of breath, dizziness, lightheadedness or chest pain.  11/28/23 Patient is here for follow up. She continues to report sx of fatigue, unchanged from her baseline. She denies shortness of breath, dizziness, lightheadedness or chest pain. Patient endorses sensation of abdominal discomfort and general feeling of feeling unwell unable to further describe / elaborate   1/4/24 Patient is here to follow up for MDS/MPN, accompanied by spouse. She feels more fatigue and dizzy, denies shortness of breath, chest pain, bleeding of palpitations,. She did not report to the ER yesterday for blood transfusion as advised for Hgb 6.4 gm/dL.  1/25/24 Patient is here to follow up for MDS/MPN, accompanied by her caregiver. She feels fatigue which is unchanged from her baseline, denies shortness of breath, chest pain, bleeding of palpitations, Her last blood transfusion was on 1/4/24 int he ER. She had seen the nephrologist and was recommended further lab test.  2/15/24 Patient is here to follow up for MDS/MPN, accompanied by her caregiver. She feels fatigue which is unchanged from her baseline, denies shortness of breath, chest pain, bleeding or palpitations, Her last blood transfusion was on 2/1/24 in the ER.  3/7/24 Patient is here to follow up for MDS/MPN, accompanied by her caregiver. She feels fatigue which is unchanged from her baseline, denies shortness of breath, chest pain, bleeding or palpitations. She gets home labs, last Hgb 6.4 gm/dL on 3/6/24.  3.28.24 Pt is here for follow up. She was transfused 2 units last week. C/O abd pain, gaseous bloating. No rectal or vaginal bleeding. No fever.  8/1/2024:  Here with her health aide on a wheel chair. Doesnt feel well, feels like she needs a blood transfusion.  Has cough for 2 weeks, dry.

## 2024-08-02 NOTE — ASSESSMENT
[FreeTextEntry1] : Patient is a 79 year old female with myelodysplastic syndrome, previously treated with Revlimid and Procrit and Vidaza. 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. Patient is s/p 9 cycles of Vidaza and she was requiring pRBC transfusion every 3 weeks. Given the persistent transfusion dependence she was switched to Luspatercept.  DETECTED GENOMIC ALTERATIONS: Tier III: Variants of Unknown Clinical Significance SF3B1 [id number]  Results of BM biopsy (3/30/22) showed No e/o increased blasts. Pt is needing PRBCs almost every 3 weeks. Advised to follow up with Dr Ferrell to consider any other option or clinical trial. Had Called Dr Ferrell's office and discussed case with her. No clinical trial available, Rec PRBCS.  S/P port placement on 3/1/23.  PLAN:  Patient had labs done at home yesterday, reviewed in HIE, HB 5.5, platelet 510. Will transfuse 1 unit PRBC today.  Also due for her 3 weekly, Luspatercept 1.75 mg/kg, ordered for today. SE discussed including: hypertension, abdominal pain, diarrhea, nausea, increased serum alanine aminotransferase, increased serum aspartate aminotransferase, increased serum bilirubin, dizziness, fatigue, headache, vertigo, arthralgia, musculoskeletal pain, ostealgia, decreased creatinine clearance, cough, dyspnea.  The therapy dose was adjusted according to pt's labs and anticipated tolerance. The high risk of complications and complexity associated with this therapy administration has been explained to the patient and her daughter and they were both agreeable. Treatment will be administered under my direct supervision.  -- Holding Retacrit for now. -- Monitor CBC weekly and administer 1 pRBC unit as needed when Hgb < 7 gm/dL. Pt has e/o iron overload, so transfusions need to be kept at a minimum if possible. She is maintaining her requirement of PRBCs almost Q 3-4 weeks. -- Continue to follow up with PCP and endo as recommended.  # Vitamin B 12 deficiency -- Continue Vitamin B 12 injection every 6 weeks, last given 2 weeks back as per the patient.  # Thrombocytosis (controlled) -- Previously on Hydrea. -- Platelet stable -- Will continue to monitor.

## 2024-08-02 NOTE — ASSESSMENT
[FreeTextEntry1] : Patient is a 79 year old female with myelodysplastic syndrome, previously treated with Revlimid and Procrit and Vidaza. 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. Patient is s/p 9 cycles of Vidaza and she was requiring pRBC transfusion every 3 weeks. Given the persistent transfusion dependence she was switched to Luspatercept.  DETECTED GENOMIC ALTERATIONS: Tier III: Variants of Unknown Clinical Significance SF3B1 [id number]  Results of BM biopsy (3/30/22) showed No e/o increased blasts. Pt is needing PRBCs almost every 3 weeks. Advised to follow up with Dr Ferrell to consider any other option or clinical trial. Had Called Dr Ferrell's office and discussed case with her. No clinical trial available, Rec PRBCS.  S/P port placement on 3/1/23.  PLAN:  Patient had labs done at home yesterday, reviewed in HIE, HB 5.5, platelet 510. Will transfuse 1 unit PRBC today.  Also due for her 3 weekly, Luspatercept 1.75 mg/kg, ordered for today. SE discussed including: hypertension, abdominal pain, diarrhea, nausea, increased serum alanine aminotransferase, increased serum aspartate aminotransferase, increased serum bilirubin, dizziness, fatigue, headache, vertigo, arthralgia, musculoskeletal pain, ostealgia, decreased creatinine clearance, cough, dyspnea.  The therapy dose was adjusted according to pt's labs and anticipated tolerance. The high risk of complications and complexity associated with this therapy administration has been explained to the patient and her daughter and they were both agreeable. Treatment will be administered under my direct supervision.  -- Holding Retacrit for now. -- Monitor CBC weekly and administer 1 pRBC unit as needed when Hgb < 7 gm/dL. Pt has e/o iron overload, so transfusions need to be kept at a minimum if possible. She is maintaining her requirement of PRBCs almost Q 3-4 weeks. -- Continue to follow up with PCP and endo as recommended.  # Vitamin B 12 deficiency -- Continue Vitamin B 12 injection every 6 weeks, last given 2 weeks back as per the patient.  # Thrombocytosis (controlled) -- Previously on Hydrea. -- Platelet stable -- Will continue to monitor.

## 2024-08-02 NOTE — HISTORY OF PRESENT ILLNESS
[de-identified] : This is a 75 year old  female with history of MDS. She's was previously treated with Revlimid 5 mg, 2 weeks on, 1 week off.  \par  She is also on Procrit.\par   She underwent colonoscopy (2/2017)  Results noted no colitis, only hyperplastic polyp noted. \par   \par   [de-identified] : She missed on appointment for procrit last week. She starts her next cycle on 6/25/18 C/o back pain radiating down her left which occurred when she bent to  something. No change in diarrhea.  7/31/18: Doing well. On Revlimid for more than 2yrs, 5 mg 2 weeks on 1 week off. She will be starting week on tonight.  C/o diarrhea. On Imodium 2 pills AM and 2 pills PM. Doesn't help much with diarrhea.  C/o nausea, abdominal pain.    Colonoscopy in 2016 was normal.  Did not have blood transfusion for over 2 years.  On procrit [id number] weekly. Missed last week on 7/24/18 as she was out of town. She has been non compliant with weekly Procrit. Mammogram in 2017 was normal.   9/11/18 pt is here for follow up. She feels the lomotil helps with the diarrhea. She was away for a few weeks and missed her procrit injections. No fever, abdominal  discomfort has been less since since use of lomotil. She started a new cycle 2 days ago.  10/2/18: She will start Revlimid tonight (week on). 2 weeks on, 1 week off.  On ASA 81mg.  Denies fever, nausea, vomiting, chest pain, SOB, abdominal pain, and bladder problems. C/o diarrhea.  S/p 2 units PRBC transfusion on 9/25/18.  On Procrit 61330 units weekly. Not compliant every week.  C/o intermittent dizziness.   10/31/18 Pt is here for follow up. C/O significant fatigue. Continues to have diarrhea, takes imodium and lomotil as needed. C/o dry cough, no fever. Cough started a month ago. It is worse in the mornings however over last week it has been constant all day. No chest pain. She was found to have low B12 levels and was started on B12 injections.  11/27/18: Doing well. Hospitalized for 3 days for cellulitis/abcess of the back s/p drainage and on Abx currently. Developed 3 days after Mg infusion as per pt.  Denies nausea, vomiting, chest pain, SOB, abdominal pain, bowel and bladder problems. This is the week on Revlimid (week 1). S/p 1 unit PRBC transfusion in the hospital.  On Procrit weekly   12/27/18: Doing well. No major complaints. Denies fever, nausea, vomiting, chest pain, SOB, abdominal pain, and bladder problems. On Revlimid 5 mg 2 weeks on 1 week off. This is the week off. She will start the week on sunday (12/30/18). Due for Procrit 89944 units today.  Still has diarrhea. 4-5bm/day. On monthly B12 injections.   1/30/19: Patient here for follow up for MDS.  She is taking Revlimid 5 mg, 2 weeks on, 1 week off.  She will complete this current cycle on Saturday.  She has no new complaints today.  Patient denies cough, shortness of breath, denies fever, denies bone pain.  03/04/2019 Patient is here for a follow up visit. She complaints of severe pain in her left shoulder and has had abscess drained 2 weeks ago. However the pain is worse and she has purulent discharge on examination. She also has Nasal sores that are painful. Culture from 11/2018 showed MRSA.  Denies Fever.  She also complaints of swelling in her right leg. Not tender and not erythematous and negative Gong;s sign.  Her diarrhea with Revlimid is ongoing and Imodium and Lomotil helps but not everyday.  She is on 60,000 units of procrit weekly and Revlimid 5 mg 2 weeks on 1 week off.   4/23/19 Pt is here for follow up. She feels well. The nasal sores have improved with bactroban The abscess on left shoulder has resolved. However she has a new one on the middle of her back. No fever. Pt has been on procrit 97432 units weekly, however she missed a dose in between.  5/8/19 pt is here for follow up. no new complaints. feels well. Her skin abscess has resolved. she has been unable to go to ID, as she could not get an appt. No bleeding. She is compliant with revlimid.  6/6/19 Pt is here for follow up. no new complaints  Feels well. Is on week 2 of her Revlimid cycle.  No transfusions since last visit  7/17/19 Pt is here for follow up. C/O fatigue. Was away for a few days two weeks ago, but missed treatment with procrit for 2 weeks. Is complaint with Revlimid 2 weeks on 1 week off. Diarrhea is a little improved.  8/14/19 Patient here for follow up visit, feeling well.  Although she is complaining of some pain at the left scapula area recently.  Patient denies cough, shortness of breath, denies fever, denies other bone pain.  She is off Revlimid  this week, due to restart on Monday.  She is scheduled for Procrit and Vitamin B12 injection today.  She plans to leave the country tomorrow to visit her mother who is ill.  She will return in about 10 days.  9/11/19 Pt is here for follow up. She was away for 3 weeks, out of which she took procrit for 2 weeks in [place]. She missed last week's dose. She had CBCs in [place] which showed Hgb of 8.9 She feels well. She still getting diarrhea. She has been using lomotil with very minimal relief. She started a new cycle of Revlimid 4 days ago.  10/3/19 Patient here for follow up visit, feeling fairly well.   Patient denies cough, shortness of breath, denies fever, denies other bone pain.  She remains on Revlimid.  She is scheduled for Procrit and Vitamin B12 injection today.  10/24/19 Pt is here for follow up. Feels well. No SOB, fatigue. Compliant with procrit and Revl;imid. Remains on ASa. Diarrhea is unchanged. Pt takes imodium as needed.  11/14/19 Pt is here for follow up. No new complaints. Starts a new cycle of Revlimid today. Diarrhea is same as before, no rectal bleeding. No fever.  12/5/19 Pt is here for follow up. No new complaints. Denies feeling weaker than usual. No fever, SOB. No change in frequency of diarrhea. No pain. Will start next cycle of Revlimid in 3 days.   !/16/20 Pt was recently DCed from [facility] 3 days ago after being treated for pneumonia and MARCO. She is on po Levaquin. She restarted Revlmid 3 days ago. She is feeling better now. No fever. No SOB, Chest pain and back pain has resolved. Pt has a cough, no expectoration. She also recd a unit of PRBCs last week in the hospital   1/30/20 Patient here for follow up visit, feeling well.  She has no new complaints. Cough is almost gone completely.  Patient denies shortness of breath, denies fever, denies bone pain.  Her appetite is ok, weight is stable.  She is due to restart Revlimid on Monday.  02/20/20 Pt is here for follow up, feeling well. Offers no new complaints. Denies SOB, fever, chills, weight loss, CP, cough.  Currently off week of Revlimid 5 mg. Restarts on Monday. Has procrit 80,000 units today. Next b 12 injection due in 2 weeks  Did not get chest Xray CBC reviewed WBC 3.1, h/h 8.3/25%, plt 386, neutrophils 1.29  3/12/20 Pt is here for follow up. She took Revlimid for 2 weeks and then has been off for one week although she was advised to take it daily without break. No SOB, Cough, fever. Has mild fatigue.  04/02/2020 SIMONA KUHN a 75 year F is here today for follow up visit of MDS. Denies fever, chills, cough,night sweats, weight loss.  Denies bleeding or bruising. Pt receives procrit 80,000 units weekly and B12 IM monthly.  Continued Revlimid 5 mg daily x 3 weeks, has 1 dose left tonight.  CBC reviewed: WBC 3.2, H/H 8.1/25.2, neutrophils 1.6, PLT 72  4/20/2020: The patient is here for follow up . She has no complaints of fever, chills, dizziness , chest pain and shortness of breath . She is still with diarrhea , up to 10 watery BMs per day, responds poorly to imodium and lomotil. She is on Revlimid 5 mg daily , took last dose yesterday night. Her last Procrit was on April 13.   5/26/20 Pt is here for follow up. She is feeling well. Denies SOB, chest pain, fever. Continues to have diarrhea although she is off of Revlmid. Thinks it may due to diabetic meds. Wants to discuss BM bx results  6/22/20 Pt is here for follow up. Feels well. Denies any fever, N, V, D Continues to have diarrhea, she will talk to her PCP about changing metformin for DM. Has been needing PRBCs 1 unit each month  7/20/20 Pt is here for follow up. No new complaints. Has been feeling well. No SOB, CP.  No N, V, D. She has recd 2 units of PRBCs since May 20.  8/17/20 Pt is here for a follow up visit. She is feeling well. No new symptoms. No N, V, D. No bruising or bleeding. She continues to need PRBCs every 2-3 weeks.  8/31/20 Pt is here for follow up. She is feeling well. No N, V, D. She is tolerating the hydrea well. No SOB, CP No bruising ior bleeding  9/8/20 Pt is here for follow up. She had been contacted by the [facility] last week to advise her to stop the hydrea. Pt did not check her messages and continued with Hydrea. No fever, N, V, bleeding. Saw Dr Ferrell last week.  9/14/20 Pt is here for folow up. Besides her usual complaint of diarrhea she is feeling well. Recd 1 unit PRBC last week. Has stopped Hydrea. No fever, mouth sores.  9/29/20 Pt is here for folow up. No new complaints. Is seeing GI for diarrhea net week. No fever, night sweats. REcd 3 units of PRBC this month  10/13/20 Pt is here for follow up. No new complaints. Has not needed a transfusion since 3 weeks ago. Continues to have diarrhea, waiting to be seen by GI  10/26/20 Pt is here for follow up. C/O feeling fatigued. Has CLARK. No nausea or vomiting. No fever. Diarrhea has improved a little. She is no longer on Metformin  11/9/20 Pt is here for follow up. Feels well. Denies SOB, CP, fatigue  12/7/20- Patient is for follow up and is due for cycle 9 of Vidaza.  She still has loose BM sometimes 10 times a day and was seeing Dr. Corey from GI- did not follow up recently and is unable to get an appt with him. She has lost about 10 lbs since September. No N/V. No fever or chills. No CP/SOB. She has been getting PRBC transfusion every 3 weeks now  01/04/20 Pt presented today for f/u visit . She is s/p 8 cycles of vidaza and was started on Luspatercept in Dec , 2020 . So far she received 1 injection , she is due for 2nd injection today . Adverse effect profile was discussed with her in detail , she reports having some dizziness and weakness after first injection . She received blood transfusion last wk . Blood work from today reviewed as well and counts are adequate . She denies any fevers,  chills,  or any new symptoms .   1/25/21 Pt is here for follow up. C/O diarrhea, otherwise feeling better. Has not needed a transfusion since 12/29 2/16/21 Pt is here for follow up. Needed transfusion on 2/8/21. Continues to C/o fatigue. Diarrhea remains the same, has not made GI appt as yet.  3/8/21 Pt is here for follow up. Feels better today. Recd 1 unit PRBCs last week. Diarrhea is same as before. has an appt with GI next week. No fever  3/30/21 Pt is here for follow up. Feels better. Last transfusion was on 3/2/21 Saw GI and was started on cholestyramine and metformin was DCED with resolution of diarrhea. She denies any SOB, Fatigue is better  4/20/2021 Pt is here to f/u for MDS She is s/p Luspatercept cycle #6 She is compliant with Aspirin She feels better today, appetite is good She denies shortness of breath, fatigue, or weakness  She c/o of diarrhea but it has improved since she was started on Cholestyramine. Stool is soft and less in frequency She received COVID vaccine x 2 doses  5/11/21 pt is here for follow up. Feels the same with fatigue, diarrhea. No SOB  6/21/21 Pt is here for follow up. Feels well. No SOB, CP, N< V. Diarrhea is better controlled now. Last PRBC transfusion was 2 weeks ago.  7/19/21 Pt is here for follow up. Feels a little tired, last transfusion was 6/1/21. No bleeding, fever, SOB  8/10/2021 Patient is here to follow up for her MDS. She is on Luspatercept, tolerating well. She feels well today, the appetite is good. She denies shortness of breath, fatigue, fever, night sweats, abdominal pain, arthralgias/myalgias.  8/31/21 Pt is here for follow up. C/O feeling very fatigued. No bleeding. No fever.  9/21/2021 Patient is here to follow up for MDS. She is on Luspatercept and Retacrit, tolerating well. She gets blood transfusion as needed for Hgb <7 gm/dL She is c/o of fatigue, no shortness of breath, dizziness, chills or lightheadedness. She denies melena or hematochezia. Her appetite is good, no nausea/vomiting.  10/28/2021 Patient is here to follow up for MDS. She is on Luspatercept and Retacrit, tolerating well. She gets blood transfusion to keep Hgb > 7 gm/dL. She is c/o of chronic fatigue, no shortness of breath, dizziness, chills or lightheadedness. She denies melena or hematochezia. Her appetite is good, no nausea/vomiting. She c/o of severe diarrhea, 10-12x/day watery stool while on Imodium in the last 2 weeks. Last colonoscopy was in 2016, benign as per patient.  11/18/21 Pt is here for follow up. C/O fatigue. No SOB, CP  12/9/21 Pt is here for follow up. Feels better today. Recd 2 units last week in ER. Planning to go to [place] for over a week and does not want to miss her procrit dose. Diarrhea is stable,  12/23/21 Pt is here for follow up. Feels better. No SOB, CP. Going to [place] tomorrow. Has not been able to get Procrit injection from the pharmacy yet d/t insurance issues  2/3/22 Pt is here for follow up. Feeling same as usual. No SOB, CP. Last transfusion was 2 weeks ago in ER> No bleeding reported  3/3/22 Pt is here for follow up. C/O fatigue. Had black stools X2  3/30/22 Pt is feeling well. Here for treatment and BM biopsy  4/21/22 Pt is here fir follow up. Was in ER last week. Was give 2 units of PRBCs. Pt feels less tired today. Wants to discuss BM rslts  5/12/22 Pt is here today for follow up visit. Pt c/o feeling tired.  Hgb=6.9.  One unit transfusion scheduled.  6/23/22 Pt is here for follow up. Feels well today. No SOB, chest pain. Has not made appt with [facility]  7/14/22 Pt is here for follow up for lowrisk myelodysplastic syndrome. She came in late today because she was not feeling well this morning- dizziness & weakness. She denies SOB, CP.  9/22/22 Pt is here for follow up. C/O fatigue. was in the ER 3 weeks ago and recd 2 U PRBCS  11/10/22 Patient is here to follow up for MDS/MPN. S/P 2 units pRBC last weekend, feeling much better now. She denies shortness of breath, chest pain or headache.  1/5/2023 Patient is here to follow up for MDS/MPN, accompanied by spouse and daughter. She is feeling much better after she received blood transfusion on 12/29/22. She denies shortness of breath, chest pain, dizziness or headache. Pt fell on 12/28/22 when she slipped while getting down the stairs and landed on her right knee. Now c/o of severe right knee pain with limited ROM. She has not been evaluated since the incident happened. She is taking Aleeve with minimal relief.  1/19/2023 Patient is here to follow up for MDS/MPN and treatment. She feel fine, has fatigue not worse than her baseline. She denies shortness of breath, chest pain, dizziness, headache or bleeding. She was evaluated at the ER on 1/5/2023 for her fall and imaging showed no fracture, except for mild joint effusion.  2/9/23 Patient presents for follow up today. She complains of light-headedness. Her BP is elevated to 180/70 mmHg. She does not take any meds and says she is not diagnosed with HTN. She is due for Procrit & Luspatercept today. Reports her energy level is at baseline. No other complaints today. Her CMP showed serum glucose > 600 mg/dl, she was sent to ER with the transport.  3/2/32 Patient here for follow up for MDS. She is scheduled for next luspatercept injection. She is status post port placement yesterday, so she has some discomfort at chest area from that. She feels somewhat weak, appetite is adequate, weight is stable. She is under the care of endocrinologist to try to get her DM under better control. She was recommended to take ASA 81 mg when she was discharged from the hospital.  3/23/23 Patient here for follow up for MDS. She is scheduled for next Luspatercept injection. She is status post port placement She feels somewhat weak, appetite is adequate, weight is stable. She is under the care of endocrinologist to try to get her DM under better control. She was recommended to take ASA 81 mg when she was discharged from the hospital.  4/13/23 Pt is here for follow up. Daughter is with her. Neuro eval has not been completed yet. No bleeding, SOB, pain recd 2 Units PRBCs 2 weeks ago  5/4/23 Pt is here for follow up. Pt C/O not feeling well today. Denies pain. Has dizziness. Fasting bld sugar was over 350. She is not on insulin. Denies diarrhea, N, SOB, CP  5/25/23 Pt is here for follow up, accompanied by formal caregiver. She feels well except for fatigue which is unchanged from baseline. She denies chest pain, shortness of breath, bleeding or decreased urinary output. She states that she saw nephro last week and follows up with her PCP for her diabetes.  6/15/23 Pt is here for follow up. Feels weak, No SOB, CP  8/3/23 Pt is here for follow up Feels fatigued. Has. chronic diarrhea which has been attributed to her diabetes meds  8/30/23 Pt is here for follow C/O diarrhea and fatigue  11/7/2023 Patient is here for follow up. She is s/p 1 unit pRBC on 10/26/23. She feels fatigue, unchanged from her baseline. She denies shortness of breath, dizziness, lightheadedness or chest pain.  11/28/23 Patient is here for follow up. She continues to report sx of fatigue, unchanged from her baseline. She denies shortness of breath, dizziness, lightheadedness or chest pain. Patient endorses sensation of abdominal discomfort and general feeling of feeling unwell unable to further describe / elaborate   1/4/24 Patient is here to follow up for MDS/MPN, accompanied by spouse. She feels more fatigue and dizzy, denies shortness of breath, chest pain, bleeding of palpitations,. She did not report to the ER yesterday for blood transfusion as advised for Hgb 6.4 gm/dL.  1/25/24 Patient is here to follow up for MDS/MPN, accompanied by her caregiver. She feels fatigue which is unchanged from her baseline, denies shortness of breath, chest pain, bleeding of palpitations, Her last blood transfusion was on 1/4/24 int he ER. She had seen the nephrologist and was recommended further lab test.  2/15/24 Patient is here to follow up for MDS/MPN, accompanied by her caregiver. She feels fatigue which is unchanged from her baseline, denies shortness of breath, chest pain, bleeding or palpitations, Her last blood transfusion was on 2/1/24 in the ER.  3/7/24 Patient is here to follow up for MDS/MPN, accompanied by her caregiver. She feels fatigue which is unchanged from her baseline, denies shortness of breath, chest pain, bleeding or palpitations. She gets home labs, last Hgb 6.4 gm/dL on 3/6/24.  3.28.24 Pt is here for follow up. She was transfused 2 units last week. C/O abd pain, gaseous bloating. No rectal or vaginal bleeding. No fever.  8/1/2024:  Here with her health aide on a wheel chair. Doesnt feel well, feels like she needs a blood transfusion.  Has cough for 2 weeks, dry.

## 2024-08-09 ENCOUNTER — OUTPATIENT (OUTPATIENT)
Dept: OUTPATIENT SERVICES | Facility: HOSPITAL | Age: 79
LOS: 1 days | End: 2024-08-09
Payer: MEDICARE

## 2024-08-09 ENCOUNTER — APPOINTMENT (OUTPATIENT)
Dept: GASTROENTEROLOGY | Facility: CLINIC | Age: 79
End: 2024-08-09

## 2024-08-09 ENCOUNTER — APPOINTMENT (OUTPATIENT)
Age: 79
End: 2024-08-09

## 2024-08-09 ENCOUNTER — OUTPATIENT (OUTPATIENT)
Dept: OUTPATIENT SERVICES | Facility: HOSPITAL | Age: 79
LOS: 1 days | End: 2024-08-09

## 2024-08-09 VITALS — TEMPERATURE: 98 F | HEART RATE: 98 BPM | SYSTOLIC BLOOD PRESSURE: 134 MMHG | DIASTOLIC BLOOD PRESSURE: 66 MMHG

## 2024-08-09 DIAGNOSIS — D64.9 ANEMIA, UNSPECIFIED: ICD-10-CM

## 2024-08-09 DIAGNOSIS — D46.9 MYELODYSPLASTIC SYNDROME, UNSPECIFIED: ICD-10-CM

## 2024-08-09 DIAGNOSIS — Z98.890 OTHER SPECIFIED POSTPROCEDURAL STATES: Chronic | ICD-10-CM

## 2024-08-09 PROCEDURE — 36415 COLL VENOUS BLD VENIPUNCTURE: CPT

## 2024-08-09 PROCEDURE — 86923 COMPATIBILITY TEST ELECTRIC: CPT

## 2024-08-09 PROCEDURE — P9040: CPT

## 2024-08-09 PROCEDURE — 36430 TRANSFUSION BLD/BLD COMPNT: CPT

## 2024-08-10 DIAGNOSIS — D46.9 MYELODYSPLASTIC SYNDROME, UNSPECIFIED: ICD-10-CM

## 2024-08-16 ENCOUNTER — OUTPATIENT (OUTPATIENT)
Dept: OUTPATIENT SERVICES | Facility: HOSPITAL | Age: 79
LOS: 1 days | End: 2024-08-16
Payer: MEDICARE

## 2024-08-16 ENCOUNTER — APPOINTMENT (OUTPATIENT)
Age: 79
End: 2024-08-16

## 2024-08-16 DIAGNOSIS — D46.9 MYELODYSPLASTIC SYNDROME, UNSPECIFIED: ICD-10-CM

## 2024-08-16 DIAGNOSIS — Z98.890 OTHER SPECIFIED POSTPROCEDURAL STATES: Chronic | ICD-10-CM

## 2024-08-16 DIAGNOSIS — D64.9 ANEMIA, UNSPECIFIED: ICD-10-CM

## 2024-08-16 PROCEDURE — 36415 COLL VENOUS BLD VENIPUNCTURE: CPT

## 2024-08-16 PROCEDURE — P9040: CPT

## 2024-08-16 PROCEDURE — 36430 TRANSFUSION BLD/BLD COMPNT: CPT

## 2024-08-16 PROCEDURE — 86923 COMPATIBILITY TEST ELECTRIC: CPT

## 2024-08-17 DIAGNOSIS — D46.9 MYELODYSPLASTIC SYNDROME, UNSPECIFIED: ICD-10-CM

## 2024-08-22 ENCOUNTER — OUTPATIENT (OUTPATIENT)
Dept: OUTPATIENT SERVICES | Facility: HOSPITAL | Age: 79
LOS: 1 days | End: 2024-08-22
Payer: MEDICARE

## 2024-08-22 ENCOUNTER — APPOINTMENT (OUTPATIENT)
Age: 79
End: 2024-08-22

## 2024-08-22 VITALS
HEART RATE: 76 BPM | RESPIRATION RATE: 14 BRPM | SYSTOLIC BLOOD PRESSURE: 165 MMHG | TEMPERATURE: 97 F | DIASTOLIC BLOOD PRESSURE: 80 MMHG

## 2024-08-22 DIAGNOSIS — Z98.890 OTHER SPECIFIED POSTPROCEDURAL STATES: Chronic | ICD-10-CM

## 2024-08-22 DIAGNOSIS — D64.9 ANEMIA, UNSPECIFIED: ICD-10-CM

## 2024-08-22 PROCEDURE — 96401 CHEMO ANTI-NEOPL SQ/IM: CPT

## 2024-08-22 PROCEDURE — 96372 THER/PROPH/DIAG INJ SC/IM: CPT

## 2024-08-22 RX ORDER — CYANOCOBALAMIN (VITAMIN B-12) 1000MCG/15
1000 LIQUID (ML) ORAL ONCE
Refills: 0 | Status: COMPLETED | OUTPATIENT
Start: 2024-08-22 | End: 2024-08-22

## 2024-08-22 RX ORDER — LUSPATERCEPT 75 MG/1
110 INJECTION, POWDER, LYOPHILIZED, FOR SOLUTION SUBCUTANEOUS ONCE
Refills: 0 | Status: COMPLETED | OUTPATIENT
Start: 2024-08-22 | End: 2024-08-22

## 2024-08-22 RX ADMIN — Medication 1000 MICROGRAM(S): at 10:18

## 2024-08-22 RX ADMIN — LUSPATERCEPT 110 MILLIGRAM(S): 75 INJECTION, POWDER, LYOPHILIZED, FOR SOLUTION SUBCUTANEOUS at 10:15

## 2024-08-23 ENCOUNTER — APPOINTMENT (OUTPATIENT)
Age: 79
End: 2024-08-23

## 2024-08-30 ENCOUNTER — APPOINTMENT (OUTPATIENT)
Age: 79
End: 2024-08-30

## 2024-09-06 ENCOUNTER — APPOINTMENT (OUTPATIENT)
Age: 79
End: 2024-09-06

## 2024-09-13 ENCOUNTER — APPOINTMENT (OUTPATIENT)
Age: 79
End: 2024-09-13

## 2024-09-20 ENCOUNTER — OUTPATIENT (OUTPATIENT)
Dept: OUTPATIENT SERVICES | Facility: HOSPITAL | Age: 79
LOS: 1 days | End: 2024-09-20
Payer: MEDICARE

## 2024-09-20 ENCOUNTER — APPOINTMENT (OUTPATIENT)
Age: 79
End: 2024-09-20

## 2024-09-20 VITALS — TEMPERATURE: 97 F | HEART RATE: 97 BPM | DIASTOLIC BLOOD PRESSURE: 75 MMHG | SYSTOLIC BLOOD PRESSURE: 150 MMHG

## 2024-09-20 DIAGNOSIS — Z98.890 OTHER SPECIFIED POSTPROCEDURAL STATES: Chronic | ICD-10-CM

## 2024-09-20 DIAGNOSIS — D46.9 MYELODYSPLASTIC SYNDROME, UNSPECIFIED: ICD-10-CM

## 2024-09-20 PROCEDURE — 86923 COMPATIBILITY TEST ELECTRIC: CPT

## 2024-09-20 PROCEDURE — 36430 TRANSFUSION BLD/BLD COMPNT: CPT

## 2024-09-20 PROCEDURE — P9040: CPT

## 2024-09-21 DIAGNOSIS — D46.9 MYELODYSPLASTIC SYNDROME, UNSPECIFIED: ICD-10-CM

## 2024-09-27 ENCOUNTER — APPOINTMENT (OUTPATIENT)
Age: 79
End: 2024-09-27

## 2024-10-01 ENCOUNTER — LABORATORY RESULT (OUTPATIENT)
Age: 79
End: 2024-10-01

## 2024-10-01 ENCOUNTER — APPOINTMENT (OUTPATIENT)
Age: 79
End: 2024-10-01

## 2024-10-01 ENCOUNTER — OUTPATIENT (OUTPATIENT)
Dept: OUTPATIENT SERVICES | Facility: HOSPITAL | Age: 79
LOS: 1 days | Discharge: HOME | End: 2024-10-01
Payer: MEDICARE

## 2024-10-01 DIAGNOSIS — Z98.890 OTHER SPECIFIED POSTPROCEDURAL STATES: Chronic | ICD-10-CM

## 2024-10-01 DIAGNOSIS — D46.9 MYELODYSPLASTIC SYNDROME, UNSPECIFIED: ICD-10-CM

## 2024-10-01 LAB
HCT VFR BLD CALC: 21.5 %
HGB BLD-MCNC: 7.1 G/DL
MCHC RBC-ENTMCNC: 28.4 PG
MCHC RBC-ENTMCNC: 33 G/DL
MCV RBC AUTO: 86 FL
PLATELET # BLD AUTO: 289 K/UL
PMV BLD: 10.6 FL
RBC # BLD: 2.5 M/UL
RBC # FLD: 19.9 %
WBC # FLD AUTO: 8.48 K/UL

## 2024-10-01 PROCEDURE — 85027 COMPLETE CBC AUTOMATED: CPT

## 2024-10-01 PROCEDURE — 96401 CHEMO ANTI-NEOPL SQ/IM: CPT

## 2024-10-01 PROCEDURE — 96372 THER/PROPH/DIAG INJ SC/IM: CPT

## 2024-10-01 RX ORDER — CYANOCOBALAMIN (VITAMIN B-12) 1000MCG/ML
1000 VIAL (ML) INJECTION ONCE
Refills: 0 | Status: COMPLETED | OUTPATIENT
Start: 2024-10-01 | End: 2024-10-01

## 2024-10-01 RX ORDER — LUSPATERCEPT 25 MG/1
110 INJECTION, POWDER, LYOPHILIZED, FOR SOLUTION SUBCUTANEOUS ONCE
Refills: 0 | Status: COMPLETED | OUTPATIENT
Start: 2024-10-01 | End: 2024-10-01

## 2024-10-01 RX ADMIN — Medication 1000 MICROGRAM(S): at 13:11

## 2024-10-01 RX ADMIN — LUSPATERCEPT 110 MILLIGRAM(S): 25 INJECTION, POWDER, LYOPHILIZED, FOR SOLUTION SUBCUTANEOUS at 13:11

## 2024-10-02 DIAGNOSIS — D46.9 MYELODYSPLASTIC SYNDROME, UNSPECIFIED: ICD-10-CM

## 2024-10-03 ENCOUNTER — APPOINTMENT (OUTPATIENT)
Age: 79
End: 2024-10-03

## 2024-10-08 ENCOUNTER — INPATIENT (INPATIENT)
Facility: HOSPITAL | Age: 79
LOS: 18 days | Discharge: NURSING, REHAB OR LONG-TERM CARE | DRG: 87 | End: 2024-10-27
Attending: STUDENT IN AN ORGANIZED HEALTH CARE EDUCATION/TRAINING PROGRAM | Admitting: STUDENT IN AN ORGANIZED HEALTH CARE EDUCATION/TRAINING PROGRAM
Payer: MEDICARE

## 2024-10-08 ENCOUNTER — RESULT REVIEW (OUTPATIENT)
Age: 79
End: 2024-10-08

## 2024-10-08 VITALS
RESPIRATION RATE: 14 BRPM | DIASTOLIC BLOOD PRESSURE: 73 MMHG | HEART RATE: 121 BPM | OXYGEN SATURATION: 96 % | SYSTOLIC BLOOD PRESSURE: 147 MMHG

## 2024-10-08 DIAGNOSIS — S06.5X0A TRAUMATIC SUBDURAL HEMORRHAGE WITHOUT LOSS OF CONSCIOUSNESS, INITIAL ENCOUNTER: ICD-10-CM

## 2024-10-08 DIAGNOSIS — Z98.890 OTHER SPECIFIED POSTPROCEDURAL STATES: Chronic | ICD-10-CM

## 2024-10-08 LAB
ACANTHOCYTES BLD QL SMEAR: SLIGHT — SIGNIFICANT CHANGE UP
ALBUMIN SERPL ELPH-MCNC: 4 G/DL — SIGNIFICANT CHANGE UP (ref 3.5–5.2)
ALBUMIN SERPL ELPH-MCNC: 4.7 G/DL — SIGNIFICANT CHANGE UP (ref 3.5–5.2)
ALP SERPL-CCNC: 137 U/L — HIGH (ref 30–115)
ALP SERPL-CCNC: 93 U/L — SIGNIFICANT CHANGE UP (ref 30–115)
ALT FLD-CCNC: 33 U/L — SIGNIFICANT CHANGE UP (ref 0–41)
ALT FLD-CCNC: 34 U/L — SIGNIFICANT CHANGE UP (ref 0–41)
ANION GAP SERPL CALC-SCNC: 13 MMOL/L — SIGNIFICANT CHANGE UP (ref 7–14)
ANION GAP SERPL CALC-SCNC: 17 MMOL/L — HIGH (ref 7–14)
ANISOCYTOSIS BLD QL: SLIGHT — SIGNIFICANT CHANGE UP
APTT BLD: 29.6 SEC — SIGNIFICANT CHANGE UP (ref 27–39.2)
APTT BLD: 30.9 SEC — SIGNIFICANT CHANGE UP (ref 27–39.2)
AST SERPL-CCNC: 34 U/L — SIGNIFICANT CHANGE UP (ref 0–41)
AST SERPL-CCNC: 39 U/L — SIGNIFICANT CHANGE UP (ref 0–41)
B-OH-BUTYR SERPL-SCNC: 0.4 MMOL/L — SIGNIFICANT CHANGE UP
BASOPHILS # BLD AUTO: 0 K/UL — SIGNIFICANT CHANGE UP (ref 0–0.2)
BASOPHILS NFR BLD AUTO: 0 % — SIGNIFICANT CHANGE UP (ref 0–1)
BILIRUB SERPL-MCNC: 0.7 MG/DL — SIGNIFICANT CHANGE UP (ref 0.2–1.2)
BILIRUB SERPL-MCNC: 2.6 MG/DL — HIGH (ref 0.2–1.2)
BLD GP AB SCN SERPL QL: SIGNIFICANT CHANGE UP
BUN SERPL-MCNC: 34 MG/DL — HIGH (ref 10–20)
BUN SERPL-MCNC: 42 MG/DL — HIGH (ref 10–20)
CALCIUM SERPL-MCNC: 9 MG/DL — SIGNIFICANT CHANGE UP (ref 8.4–10.4)
CALCIUM SERPL-MCNC: 9.5 MG/DL — SIGNIFICANT CHANGE UP (ref 8.4–10.5)
CHLORIDE SERPL-SCNC: 101 MMOL/L — SIGNIFICANT CHANGE UP (ref 98–110)
CHLORIDE SERPL-SCNC: 114 MMOL/L — HIGH (ref 98–110)
CK SERPL-CCNC: 56 U/L — SIGNIFICANT CHANGE UP (ref 0–225)
CK SERPL-CCNC: 59 U/L — SIGNIFICANT CHANGE UP (ref 0–225)
CO2 SERPL-SCNC: 17 MMOL/L — SIGNIFICANT CHANGE UP (ref 17–32)
CO2 SERPL-SCNC: 19 MMOL/L — SIGNIFICANT CHANGE UP (ref 17–32)
CREAT SERPL-MCNC: 1.1 MG/DL — SIGNIFICANT CHANGE UP (ref 0.7–1.5)
CREAT SERPL-MCNC: 1.3 MG/DL — SIGNIFICANT CHANGE UP (ref 0.7–1.5)
EGFR: 42 ML/MIN/1.73M2 — LOW
EGFR: 51 ML/MIN/1.73M2 — LOW
ELLIPTOCYTES BLD QL SMEAR: SLIGHT — SIGNIFICANT CHANGE UP
EOSINOPHIL # BLD AUTO: 0.22 K/UL — SIGNIFICANT CHANGE UP (ref 0–0.7)
EOSINOPHIL NFR BLD AUTO: 0.9 % — SIGNIFICANT CHANGE UP (ref 0–8)
GAS PNL BLDA: SIGNIFICANT CHANGE UP
GIANT PLATELETS BLD QL SMEAR: PRESENT — SIGNIFICANT CHANGE UP
GLUCOSE BLDC GLUCOMTR-MCNC: 156 MG/DL — HIGH (ref 70–99)
GLUCOSE BLDC GLUCOMTR-MCNC: 156 MG/DL — HIGH (ref 70–99)
GLUCOSE BLDC GLUCOMTR-MCNC: 157 MG/DL — HIGH (ref 70–99)
GLUCOSE BLDC GLUCOMTR-MCNC: 179 MG/DL — HIGH (ref 70–99)
GLUCOSE BLDC GLUCOMTR-MCNC: 181 MG/DL — HIGH (ref 70–99)
GLUCOSE BLDC GLUCOMTR-MCNC: 273 MG/DL — HIGH (ref 70–99)
GLUCOSE BLDC GLUCOMTR-MCNC: 70 MG/DL — SIGNIFICANT CHANGE UP (ref 70–99)
GLUCOSE SERPL-MCNC: 155 MG/DL — HIGH (ref 70–99)
GLUCOSE SERPL-MCNC: 465 MG/DL — CRITICAL HIGH (ref 70–99)
HCT VFR BLD CALC: 21.8 % — LOW (ref 37–47)
HCT VFR BLD CALC: 30.8 % — LOW (ref 37–47)
HGB BLD-MCNC: 10.3 G/DL — LOW (ref 12–16)
HGB BLD-MCNC: 6.8 G/DL — CRITICAL LOW (ref 12–16)
HYPOCHROMIA BLD QL: SIGNIFICANT CHANGE UP
INR BLD: 1.19 RATIO — SIGNIFICANT CHANGE UP (ref 0.65–1.3)
INR BLD: 1.36 RATIO — HIGH (ref 0.65–1.3)
LACTATE SERPL-SCNC: 1.7 MMOL/L — SIGNIFICANT CHANGE UP (ref 0.7–2)
LACTATE SERPL-SCNC: 4.4 MMOL/L — CRITICAL HIGH (ref 0.7–2)
LYMPHOCYTES # BLD AUTO: 0 % — LOW (ref 20.5–51.1)
LYMPHOCYTES # BLD AUTO: 0 K/UL — LOW (ref 1.2–3.4)
MAGNESIUM SERPL-MCNC: 1.6 MG/DL — LOW (ref 1.8–2.4)
MANUAL SMEAR VERIFICATION: SIGNIFICANT CHANGE UP
MCHC RBC-ENTMCNC: 28.2 PG — SIGNIFICANT CHANGE UP (ref 27–31)
MCHC RBC-ENTMCNC: 30.5 PG — SIGNIFICANT CHANGE UP (ref 27–31)
MCHC RBC-ENTMCNC: 31.2 G/DL — LOW (ref 32–37)
MCHC RBC-ENTMCNC: 33.4 G/DL — SIGNIFICANT CHANGE UP (ref 32–37)
MCV RBC AUTO: 90.5 FL — SIGNIFICANT CHANGE UP (ref 81–99)
MCV RBC AUTO: 91.1 FL — SIGNIFICANT CHANGE UP (ref 81–99)
METAMYELOCYTES # FLD: 1.7 % — HIGH (ref 0–0)
MICROCYTES BLD QL: SLIGHT — SIGNIFICANT CHANGE UP
MONOCYTES # BLD AUTO: 1.68 K/UL — HIGH (ref 0.1–0.6)
MONOCYTES NFR BLD AUTO: 7 % — SIGNIFICANT CHANGE UP (ref 1.7–9.3)
MYELOCYTES NFR BLD: 1.7 % — HIGH (ref 0–0)
NEUTROPHILS # BLD AUTO: 20.71 K/UL — HIGH (ref 1.4–6.5)
NEUTROPHILS NFR BLD AUTO: 86.1 % — HIGH (ref 42.2–75.2)
NRBC # BLD: 0 /100 WBCS — SIGNIFICANT CHANGE UP (ref 0–0)
PHOSPHATE SERPL-MCNC: 4.5 MG/DL — SIGNIFICANT CHANGE UP (ref 2.1–4.9)
PLAT MORPH BLD: NORMAL — SIGNIFICANT CHANGE UP
PLATELET # BLD AUTO: 293 K/UL — SIGNIFICANT CHANGE UP (ref 130–400)
PLATELET # BLD AUTO: 672 K/UL — HIGH (ref 130–400)
PMV BLD: 10.6 FL — HIGH (ref 7.4–10.4)
PMV BLD: 10.7 FL — HIGH (ref 7.4–10.4)
POIKILOCYTOSIS BLD QL AUTO: SIGNIFICANT CHANGE UP
POLYCHROMASIA BLD QL SMEAR: SIGNIFICANT CHANGE UP
POTASSIUM SERPL-MCNC: 5.4 MMOL/L — HIGH (ref 3.5–5)
POTASSIUM SERPL-MCNC: 5.8 MMOL/L — HIGH (ref 3.5–5)
POTASSIUM SERPL-SCNC: 5.4 MMOL/L — HIGH (ref 3.5–5)
POTASSIUM SERPL-SCNC: 5.8 MMOL/L — HIGH (ref 3.5–5)
PROT SERPL-MCNC: 5.4 G/DL — LOW (ref 6–8)
PROT SERPL-MCNC: 6.7 G/DL — SIGNIFICANT CHANGE UP (ref 6–8)
PROTHROM AB SERPL-ACNC: 13.6 SEC — HIGH (ref 9.95–12.87)
PROTHROM AB SERPL-ACNC: 15.6 SEC — HIGH (ref 9.95–12.87)
RBC # BLD: 2.41 M/UL — LOW (ref 4.2–5.4)
RBC # BLD: 3.38 M/UL — LOW (ref 4.2–5.4)
RBC # FLD: 14.4 % — SIGNIFICANT CHANGE UP (ref 11.5–14.5)
RBC # FLD: 21.2 % — HIGH (ref 11.5–14.5)
RBC BLD AUTO: ABNORMAL
SODIUM SERPL-SCNC: 137 MMOL/L — SIGNIFICANT CHANGE UP (ref 135–146)
SODIUM SERPL-SCNC: 144 MMOL/L — SIGNIFICANT CHANGE UP (ref 135–146)
VARIANT LYMPHS # BLD: 2.6 % — SIGNIFICANT CHANGE UP (ref 0–5)
WBC # BLD: 16.21 K/UL — HIGH (ref 4.8–10.8)
WBC # BLD: 24.05 K/UL — HIGH (ref 4.8–10.8)
WBC # FLD AUTO: 16.21 K/UL — HIGH (ref 4.8–10.8)
WBC # FLD AUTO: 24.05 K/UL — HIGH (ref 4.8–10.8)

## 2024-10-08 PROCEDURE — 88311 DECALCIFY TISSUE: CPT | Mod: 26

## 2024-10-08 PROCEDURE — 84480 ASSAY TRIIODOTHYRONINE (T3): CPT

## 2024-10-08 PROCEDURE — 88304 TISSUE EXAM BY PATHOLOGIST: CPT | Mod: 26

## 2024-10-08 PROCEDURE — 88304 TISSUE EXAM BY PATHOLOGIST: CPT

## 2024-10-08 PROCEDURE — 36227 PLACE CATH XTRNL CAROTID: CPT | Mod: XS,RT

## 2024-10-08 PROCEDURE — 99291 CRITICAL CARE FIRST HOUR: CPT

## 2024-10-08 PROCEDURE — C1889: CPT

## 2024-10-08 PROCEDURE — 87205 SMEAR GRAM STAIN: CPT

## 2024-10-08 PROCEDURE — P9037: CPT

## 2024-10-08 PROCEDURE — P9040: CPT

## 2024-10-08 PROCEDURE — 83880 ASSAY OF NATRIURETIC PEPTIDE: CPT

## 2024-10-08 PROCEDURE — 85018 HEMOGLOBIN: CPT

## 2024-10-08 PROCEDURE — 86850 RBC ANTIBODY SCREEN: CPT

## 2024-10-08 PROCEDURE — 95714 VEEG EA 12-26 HR UNMNTR: CPT

## 2024-10-08 PROCEDURE — 83605 ASSAY OF LACTIC ACID: CPT

## 2024-10-08 PROCEDURE — 61312 CRNEC/CRNOT STTL XDRL/SDRL: CPT | Mod: AS

## 2024-10-08 PROCEDURE — 85045 AUTOMATED RETICULOCYTE COUNT: CPT

## 2024-10-08 PROCEDURE — 84132 ASSAY OF SERUM POTASSIUM: CPT

## 2024-10-08 PROCEDURE — 92526 ORAL FUNCTION THERAPY: CPT | Mod: GN

## 2024-10-08 PROCEDURE — 86923 COMPATIBILITY TEST ELECTRIC: CPT

## 2024-10-08 PROCEDURE — 85025 COMPLETE CBC W/AUTO DIFF WBC: CPT

## 2024-10-08 PROCEDURE — 61624 TCAT PERM OCCLS/EMBOLJ CNS: CPT

## 2024-10-08 PROCEDURE — 85610 PROTHROMBIN TIME: CPT

## 2024-10-08 PROCEDURE — 83550 IRON BINDING TEST: CPT

## 2024-10-08 PROCEDURE — 93970 EXTREMITY STUDY: CPT

## 2024-10-08 PROCEDURE — 97530 THERAPEUTIC ACTIVITIES: CPT | Mod: GP

## 2024-10-08 PROCEDURE — 94660 CPAP INITIATION&MGMT: CPT

## 2024-10-08 PROCEDURE — C1713: CPT

## 2024-10-08 PROCEDURE — 87483 CNS DNA AMP PROBE TYPE 12-25: CPT

## 2024-10-08 PROCEDURE — 88342 IMHCHEM/IMCYTCHM 1ST ANTB: CPT

## 2024-10-08 PROCEDURE — 88341 IMHCHEM/IMCYTCHM EA ADD ANTB: CPT

## 2024-10-08 PROCEDURE — 82945 GLUCOSE OTHER FLUID: CPT

## 2024-10-08 PROCEDURE — 89051 BODY FLUID CELL COUNT: CPT

## 2024-10-08 PROCEDURE — 76770 US EXAM ABDO BACK WALL COMP: CPT

## 2024-10-08 PROCEDURE — P9100: CPT

## 2024-10-08 PROCEDURE — C9399: CPT

## 2024-10-08 PROCEDURE — 93010 ELECTROCARDIOGRAM REPORT: CPT | Mod: 77

## 2024-10-08 PROCEDURE — 93306 TTE W/DOPPLER COMPLETE: CPT

## 2024-10-08 PROCEDURE — 88313 SPECIAL STAINS GROUP 2: CPT

## 2024-10-08 PROCEDURE — 80053 COMPREHEN METABOLIC PANEL: CPT

## 2024-10-08 PROCEDURE — 93971 EXTREMITY STUDY: CPT | Mod: RT

## 2024-10-08 PROCEDURE — 95700 EEG CONT REC W/VID EEG TECH: CPT

## 2024-10-08 PROCEDURE — 70250 X-RAY EXAM OF SKULL: CPT | Mod: 26

## 2024-10-08 PROCEDURE — 74177 CT ABD & PELVIS W/CONTRAST: CPT | Mod: 26,MC

## 2024-10-08 PROCEDURE — 93005 ELECTROCARDIOGRAM TRACING: CPT

## 2024-10-08 PROCEDURE — 61312 CRNEC/CRNOT STTL XDRL/SDRL: CPT

## 2024-10-08 PROCEDURE — 71045 X-RAY EXAM CHEST 1 VIEW: CPT

## 2024-10-08 PROCEDURE — 70250 X-RAY EXAM OF SKULL: CPT

## 2024-10-08 PROCEDURE — 94002 VENT MGMT INPAT INIT DAY: CPT

## 2024-10-08 PROCEDURE — 72125 CT NECK SPINE W/O DYE: CPT | Mod: 26,MC

## 2024-10-08 PROCEDURE — 82746 ASSAY OF FOLIC ACID SERUM: CPT

## 2024-10-08 PROCEDURE — 83615 LACTATE (LD) (LDH) ENZYME: CPT

## 2024-10-08 PROCEDURE — 88342 IMHCHEM/IMCYTCHM 1ST ANTB: CPT | Mod: 26

## 2024-10-08 PROCEDURE — 97535 SELF CARE MNGMENT TRAINING: CPT | Mod: GO

## 2024-10-08 PROCEDURE — 82803 BLOOD GASES ANY COMBINATION: CPT

## 2024-10-08 PROCEDURE — 70450 CT HEAD/BRAIN W/O DYE: CPT | Mod: 26,77

## 2024-10-08 PROCEDURE — 88311 DECALCIFY TISSUE: CPT

## 2024-10-08 PROCEDURE — 82330 ASSAY OF CALCIUM: CPT

## 2024-10-08 PROCEDURE — 83735 ASSAY OF MAGNESIUM: CPT

## 2024-10-08 PROCEDURE — 71045 X-RAY EXAM CHEST 1 VIEW: CPT | Mod: 26,77

## 2024-10-08 PROCEDURE — 71045 X-RAY EXAM CHEST 1 VIEW: CPT | Mod: 26

## 2024-10-08 PROCEDURE — 82728 ASSAY OF FERRITIN: CPT

## 2024-10-08 PROCEDURE — P9059: CPT

## 2024-10-08 PROCEDURE — 94640 AIRWAY INHALATION TREATMENT: CPT

## 2024-10-08 PROCEDURE — 86901 BLOOD TYPING SEROLOGIC RH(D): CPT

## 2024-10-08 PROCEDURE — 87070 CULTURE OTHR SPECIMN AEROBIC: CPT

## 2024-10-08 PROCEDURE — 80048 BASIC METABOLIC PNL TOTAL CA: CPT

## 2024-10-08 PROCEDURE — 99291 CRITICAL CARE FIRST HOUR: CPT | Mod: GC

## 2024-10-08 PROCEDURE — 97163 PT EVAL HIGH COMPLEX 45 MIN: CPT | Mod: GP

## 2024-10-08 PROCEDURE — 94760 N-INVAS EAR/PLS OXIMETRY 1: CPT

## 2024-10-08 PROCEDURE — 86900 BLOOD TYPING SEROLOGIC ABO: CPT

## 2024-10-08 PROCEDURE — C1769: CPT

## 2024-10-08 PROCEDURE — 97129 THER IVNTJ 1ST 15 MIN: CPT | Mod: GO

## 2024-10-08 PROCEDURE — 83036 HEMOGLOBIN GLYCOSYLATED A1C: CPT

## 2024-10-08 PROCEDURE — 85027 COMPLETE CBC AUTOMATED: CPT

## 2024-10-08 PROCEDURE — 92507 TX SP LANG VOICE COMM INDIV: CPT | Mod: GN

## 2024-10-08 PROCEDURE — 84145 PROCALCITONIN (PCT): CPT

## 2024-10-08 PROCEDURE — 84100 ASSAY OF PHOSPHORUS: CPT

## 2024-10-08 PROCEDURE — 88313 SPECIAL STAINS GROUP 2: CPT | Mod: 26

## 2024-10-08 PROCEDURE — 92523 SPEECH SOUND LANG COMPREHEN: CPT | Mod: GN

## 2024-10-08 PROCEDURE — 36430 TRANSFUSION BLD/BLD COMPNT: CPT

## 2024-10-08 PROCEDURE — 92610 EVALUATE SWALLOWING FUNCTION: CPT | Mod: GN

## 2024-10-08 PROCEDURE — 82962 GLUCOSE BLOOD TEST: CPT

## 2024-10-08 PROCEDURE — 87086 URINE CULTURE/COLONY COUNT: CPT

## 2024-10-08 PROCEDURE — 81001 URINALYSIS AUTO W/SCOPE: CPT

## 2024-10-08 PROCEDURE — 87641 MR-STAPH DNA AMP PROBE: CPT

## 2024-10-08 PROCEDURE — 84295 ASSAY OF SERUM SODIUM: CPT

## 2024-10-08 PROCEDURE — 84436 ASSAY OF TOTAL THYROXINE: CPT

## 2024-10-08 PROCEDURE — 88341 IMHCHEM/IMCYTCHM EA ADD ANTB: CPT | Mod: 26

## 2024-10-08 PROCEDURE — 71260 CT THORAX DX C+: CPT | Mod: 26,MC

## 2024-10-08 PROCEDURE — 36415 COLL VENOUS BLD VENIPUNCTURE: CPT

## 2024-10-08 PROCEDURE — 97167 OT EVAL HIGH COMPLEX 60 MIN: CPT | Mod: GO

## 2024-10-08 PROCEDURE — C1887: CPT

## 2024-10-08 PROCEDURE — C1894: CPT

## 2024-10-08 PROCEDURE — 73100 X-RAY EXAM OF WRIST: CPT | Mod: RT

## 2024-10-08 PROCEDURE — 70450 CT HEAD/BRAIN W/O DYE: CPT | Mod: 26,MC

## 2024-10-08 PROCEDURE — 87640 STAPH A DNA AMP PROBE: CPT

## 2024-10-08 PROCEDURE — 85014 HEMATOCRIT: CPT

## 2024-10-08 PROCEDURE — 85730 THROMBOPLASTIN TIME PARTIAL: CPT

## 2024-10-08 PROCEDURE — 99221 1ST HOSP IP/OBS SF/LOW 40: CPT

## 2024-10-08 PROCEDURE — 83540 ASSAY OF IRON: CPT

## 2024-10-08 PROCEDURE — 94003 VENT MGMT INPAT SUBQ DAY: CPT

## 2024-10-08 PROCEDURE — 75894 X-RAYS TRANSCATH THERAPY: CPT

## 2024-10-08 PROCEDURE — 75898 FOLLOW-UP ANGIOGRAPHY: CPT

## 2024-10-08 PROCEDURE — 70450 CT HEAD/BRAIN W/O DYE: CPT | Mod: MC

## 2024-10-08 PROCEDURE — 84443 ASSAY THYROID STIM HORMONE: CPT

## 2024-10-08 PROCEDURE — 36224 PLACE CATH CAROTD ART: CPT | Mod: 50

## 2024-10-08 PROCEDURE — 82607 VITAMIN B-12: CPT

## 2024-10-08 PROCEDURE — 83010 ASSAY OF HAPTOGLOBIN QUANT: CPT

## 2024-10-08 PROCEDURE — 87015 SPECIMEN INFECT AGNT CONCNTJ: CPT

## 2024-10-08 PROCEDURE — 82550 ASSAY OF CK (CPK): CPT

## 2024-10-08 PROCEDURE — 84157 ASSAY OF PROTEIN OTHER: CPT

## 2024-10-08 RX ORDER — ACETAMINOPHEN 500 MG
1000 TABLET ORAL ONCE
Refills: 0 | Status: DISCONTINUED | OUTPATIENT
Start: 2024-10-08 | End: 2024-10-08

## 2024-10-08 RX ORDER — INSULIN REG, HUM S-S BUFF 100/ML
0.5 VIAL (ML) INJECTION
Qty: 100 | Refills: 0 | Status: DISCONTINUED | OUTPATIENT
Start: 2024-10-08 | End: 2024-10-09

## 2024-10-08 RX ORDER — CHLORHEXIDINE GLUCONATE 40 MG/ML
15 SOLUTION TOPICAL EVERY 12 HOURS
Refills: 0 | Status: DISCONTINUED | OUTPATIENT
Start: 2024-10-08 | End: 2024-10-14

## 2024-10-08 RX ORDER — INSULIN REG, HUM S-S BUFF 100/ML
6 VIAL (ML) INJECTION
Qty: 100 | Refills: 0 | Status: DISCONTINUED | OUTPATIENT
Start: 2024-10-08 | End: 2024-10-08

## 2024-10-08 RX ORDER — CHLORHEXIDINE GLUCONATE 40 MG/ML
15 SOLUTION TOPICAL EVERY 12 HOURS
Refills: 0 | Status: DISCONTINUED | OUTPATIENT
Start: 2024-10-08 | End: 2024-10-09

## 2024-10-08 RX ORDER — LEVETIRACETAM 500 MG/1
1000 TABLET, FILM COATED ORAL ONCE
Refills: 0 | Status: DISCONTINUED | OUTPATIENT
Start: 2024-10-08 | End: 2024-10-08

## 2024-10-08 RX ORDER — CEFAZOLIN SODIUM 1 G
2000 VIAL (EA) INJECTION EVERY 8 HOURS
Refills: 0 | Status: COMPLETED | OUTPATIENT
Start: 2024-10-08 | End: 2024-10-09

## 2024-10-08 RX ORDER — INSULIN REG, HUM S-S BUFF 100/ML
1.5 VIAL (ML) INJECTION
Qty: 100 | Refills: 0 | Status: DISCONTINUED | OUTPATIENT
Start: 2024-10-08 | End: 2024-10-09

## 2024-10-08 RX ORDER — NICARDIPINE HCL 30 MG
5 CAPSULE, EXTENDED RELEASE ORAL
Qty: 40 | Refills: 0 | Status: DISCONTINUED | OUTPATIENT
Start: 2024-10-08 | End: 2024-10-08

## 2024-10-08 RX ORDER — NICARDIPINE HCL 30 MG
5 CAPSULE, EXTENDED RELEASE ORAL
Qty: 40 | Refills: 0 | Status: DISCONTINUED | OUTPATIENT
Start: 2024-10-08 | End: 2024-10-12

## 2024-10-08 RX ORDER — LISINOPRIL 40 MG
10 TABLET ORAL DAILY
Refills: 0 | Status: DISCONTINUED | OUTPATIENT
Start: 2024-10-08 | End: 2024-10-08

## 2024-10-08 RX ORDER — FENTANYL CITRAT/DEXTROSE 5%/PF 1250MCG/50
25 PATIENT CONTROLLED ANALGESIA SYRINGE INTRAVENOUS ONCE
Refills: 0 | Status: DISCONTINUED | OUTPATIENT
Start: 2024-10-08 | End: 2024-10-08

## 2024-10-08 RX ORDER — LEVETIRACETAM 500 MG/1
1000 TABLET, FILM COATED ORAL EVERY 12 HOURS
Refills: 0 | Status: DISCONTINUED | OUTPATIENT
Start: 2024-10-08 | End: 2024-10-09

## 2024-10-08 RX ORDER — PROPOFOL 10 MG/ML
100 INJECTION, EMULSION INTRAVENOUS
Qty: 500 | Refills: 0 | Status: DISCONTINUED | OUTPATIENT
Start: 2024-10-08 | End: 2024-10-09

## 2024-10-08 RX ORDER — SODIUM CHLORIDE 9 MG/ML
1000 INJECTION, SOLUTION INTRAMUSCULAR; INTRAVENOUS; SUBCUTANEOUS
Refills: 0 | Status: DISCONTINUED | OUTPATIENT
Start: 2024-10-08 | End: 2024-10-09

## 2024-10-08 RX ORDER — MAGNESIUM SULFATE IN 0.9% NACL 2 G/50 ML
2 INTRAVENOUS SOLUTION, PIGGYBACK (ML) INTRAVENOUS
Refills: 0 | Status: COMPLETED | OUTPATIENT
Start: 2024-10-08 | End: 2024-10-08

## 2024-10-08 RX ORDER — NICARDIPINE HCL 30 MG
2.5 CAPSULE, EXTENDED RELEASE ORAL
Qty: 40 | Refills: 0 | Status: DISCONTINUED | OUTPATIENT
Start: 2024-10-08 | End: 2024-10-09

## 2024-10-08 RX ORDER — NIFEDIPINE 90 MG
90 TABLET, EXTENDED RELEASE 24 HR ORAL DAILY
Refills: 0 | Status: DISCONTINUED | OUTPATIENT
Start: 2024-10-08 | End: 2024-10-08

## 2024-10-08 RX ORDER — CHLORHEXIDINE GLUCONATE 40 MG/ML
1 SOLUTION TOPICAL
Refills: 0 | Status: DISCONTINUED | OUTPATIENT
Start: 2024-10-08 | End: 2024-10-08

## 2024-10-08 RX ORDER — INSULIN LISPRO 100/ML
VIAL (ML) SUBCUTANEOUS EVERY 4 HOURS
Refills: 0 | Status: DISCONTINUED | OUTPATIENT
Start: 2024-10-08 | End: 2024-10-10

## 2024-10-08 RX ORDER — PANTOPRAZOLE SODIUM 40 MG/1
40 TABLET, DELAYED RELEASE ORAL DAILY
Refills: 0 | Status: DISCONTINUED | OUTPATIENT
Start: 2024-10-08 | End: 2024-10-09

## 2024-10-08 RX ORDER — CHLORHEXIDINE GLUCONATE 40 MG/ML
1 SOLUTION TOPICAL
Refills: 0 | Status: DISCONTINUED | OUTPATIENT
Start: 2024-10-08 | End: 2024-10-27

## 2024-10-08 RX ORDER — PROPOFOL 10 MG/ML
10 INJECTION, EMULSION INTRAVENOUS
Qty: 1000 | Refills: 0 | Status: ACTIVE | OUTPATIENT
Start: 2024-10-08 | End: 2025-09-06

## 2024-10-08 RX ADMIN — LEVETIRACETAM 1000 MILLIGRAM(S): 500 TABLET, FILM COATED ORAL at 12:51

## 2024-10-08 RX ADMIN — Medication 25 MICROGRAM(S): at 22:02

## 2024-10-08 RX ADMIN — Medication 2: at 22:13

## 2024-10-08 RX ADMIN — PROPOFOL 3.6 MICROGRAM(S)/KG/MIN: 10 INJECTION, EMULSION INTRAVENOUS at 22:38

## 2024-10-08 RX ADMIN — Medication 25 MG/HR: at 12:48

## 2024-10-08 RX ADMIN — Medication 25 GRAM(S): at 23:51

## 2024-10-08 RX ADMIN — Medication 25 GRAM(S): at 21:20

## 2024-10-08 RX ADMIN — PROPOFOL 3.6 MICROGRAM(S)/KG/MIN: 10 INJECTION, EMULSION INTRAVENOUS at 19:47

## 2024-10-08 RX ADMIN — Medication 25 MG/HR: at 19:36

## 2024-10-08 RX ADMIN — Medication 1850 MILLILITER(S): at 11:23

## 2024-10-08 RX ADMIN — Medication 0.5 UNIT(S)/HR: at 19:50

## 2024-10-08 RX ADMIN — Medication 25 MICROGRAM(S): at 23:00

## 2024-10-08 RX ADMIN — LEVETIRACETAM 400 MILLIGRAM(S): 500 TABLET, FILM COATED ORAL at 21:22

## 2024-10-08 RX ADMIN — Medication 100 MILLIGRAM(S): at 21:20

## 2024-10-08 RX ADMIN — Medication 25 MG/HR: at 22:40

## 2024-10-08 NOTE — ED PROVIDER NOTE - OBJECTIVE STATEMENT
79-year-old female PMH MDS (follows with Dr. Wade), DM, HTN, CKD BIBEMS after being found unresponsive on floor of her apartment.  At baseline patient independent, does all of her ADLs and has no history of dementia.  On ED arrival patient minimally responsive, opens eyes and withdraws to painful stimuli.

## 2024-10-08 NOTE — ED ADULT NURSE NOTE - NSFALLHARMRISKINTERV_ED_ALL_ED
Assistance OOB with selected safe patient handling equipment if applicable/Assistance with ambulation/Communicate risk of Fall with Harm to all staff, patient, and family/Monitor gait and stability/Provide visual cue: red socks, yellow wristband, yellow gown, etc/Reinforce activity limits and safety measures with patient and family/Bed in lowest position, wheels locked, appropriate side rails in place/Call bell, personal items and telephone in reach/Instruct patient to call for assistance before getting out of bed/chair/stretcher/Non-slip footwear applied when patient is off stretcher/[place] to call system/Physically safe environment - no spills, clutter or unnecessary equipment/Purposeful Proactive Rounding/Room/bathroom lighting operational, light cord in reach

## 2024-10-08 NOTE — CONSULT NOTE ADULT - ASSESSMENT
ASSESSMENT:  79yF w/ PMHx of DM, HTN and Myelodysplastic syndrome seen as (Trauma Consult) s/p mechanical fall +HT, ?loc, -ac. Patient was brought in by ambulance to the ED, PANSCAN was performed and Large SDH was noted. Trauma assessment in ED: ABCs intact , GCS 15 , AAOx2.    Injuries identified:   - Large acute right hemispheric subdural hematoma with mass effect and leftward midline shift approximately 1.1 cm.  -   -     PLAN:   - no further trauma work up indicated  - keep active type and screen  - transfuse as needed  - BP management, goal as per NSX  - repeat K, previously hemolyzed  - Glucose control  - OR as level 1 w NSX    Additional consultations:  - Neurosurgery  - PT/Rehab/SW  - Hospitalist/Medicine     Above plan discussed with Trauma attending, Dr. Staples  , patient, patient family, and ED team  --------------------------------------------------------------------------------------  10-08-24 @ 14:12    TRAUMA SENIOR SPECTRA: [id number]  TRAUMA TEAM SPECTRA: [id number]

## 2024-10-08 NOTE — CONSULT NOTE ADULT - SUBJECTIVE AND OBJECTIVE BOX
HPI: This is a 79 year old female with hx of DM, HTN, myelodysplastic syndrome, gets periodic blood transfusions when Hgb <7, has HHA, LKW last night, found down by daughter this morning at 8:30 AM. Pt was conscious and per daughter stated "I fell" No witnessed seizure activity. BIBA to [facility] for evaluation. CTH performed and shows right sided SDH. Pt seen and examined at bedside in ER with daughter at bedside. Pt has eyes closed but opens them to voice. Pt recalls falling this AM. Denies LOC. c/o headaches all over her head, no dizziness. AAOX2, not oriented to time. Pt answering questions slowly and having periods of lethargy. SEVERINO with noted LUE weakness.        PAST MEDICAL & SURGICAL HISTORY:  DM (diabetes mellitus)      MDS (myelodysplastic syndrome)      H/O colonoscopy          Imaging:  < from: CT Head No Cont (10.08.24 @ 11:55) >  IMPRESSION:    CT HEAD:  Large acute right hemispheric subdural hematoma with mass effect and   leftward midline shift approximately 1.1 cm.    Diffuse sclerotic changes of the calvarium with areas of internal lucency   potentially reflecting infiltrative marrow process.    CT CERVICAL SPINE:  No acute fracture or subluxation.    < end of copied text >      Home Medications:  glimepiride 2 mg oral tablet: 1 tab(s) orally 2 times a day (23 May 2024 15:17)  hydrALAZINE 10 mg oral tablet: 1 tab(s) orally every 8 hours (23 May 2024 15:16)  pioglitazone 30 mg oral tablet: 1 tab(s) orally once a day (23 May 2024 15:17)  Tradjenta 5 mg oral tablet: 1 tab(s) orally once a day (23 May 2024 15:17)      Allergies    No Known Allergies    Intolerances        ROS:  [  ] A ten-point review of systems is negative except as noted   [ x ] Due to altered mental status/intubation, subjective information were not able to be obtained from the patient. History was obtained, to the extent possible, from review of the chart and collateral sources of information    MEDICATIONS  (STANDING):  levETIRAcetam   Injectable 1000 milliGRAM(s) IV Push once  niCARdipine Infusion 5 mG/Hr (25 mL/Hr) IV Continuous <Continuous>    MEDICATIONS  (PRN):    Home meds:  · 	NIFEdipine (Eqv-Procardia XL) 90 mg oral tablet, extended release: 1 tab(s) orally once a day  · 	glimepiride 2 mg oral tablet: 1 tab(s) orally 2 times a day  · 	hydrALAZINE 10 mg oral tablet: 1 tab(s) orally every 8 hours  · 	pioglitazone 30 mg oral tablet: 1 tab(s) orally once a day  · 	Tradjenta 5 mg oral tablet: 1 tab(s) orally once a day      ICU Vital Signs Last 24 Hrs  T(C): 35.1 (08 Oct 2024 11:22), Max: 35.1 (08 Oct 2024 11:22)  T(F): 95.2 (08 Oct 2024 11:22), Max: 95.2 (08 Oct 2024 11:22)  HR: 121 (08 Oct 2024 11:08) (121 - 121)  BP: 147/73 (08 Oct 2024 11:08) (147/73 - 147/73)  BP(mean): --  ABP: --  ABP(mean): --  RR: 14 (08 Oct 2024 11:08) (14 - 14)  SpO2: 96% (08 Oct 2024 11:08) (96% - 96%)    O2 Parameters below as of 08 Oct 2024 11:08  Patient On (Oxygen Delivery Method): room air            I&O's Detail      CBC Full  -  ( 08 Oct 2024 11:20 )  WBC Count : 24.05 K/uL  RBC Count : 2.41 M/uL  Hemoglobin : 6.8 g/dL  Hematocrit : 21.8 %  Platelet Count - Automated : 672 K/uL  Mean Cell Volume : 90.5 fL  Mean Cell Hemoglobin : 28.2 pg  Mean Cell Hemoglobin Concentration : 31.2 g/dL  Auto Neutrophil # : x  Auto Lymphocyte # : x  Auto Monocyte # : x  Auto Eosinophil # : x  Auto Basophil # : x  Auto Neutrophil % : x  Auto Lymphocyte % : x  Auto Monocyte % : x  Auto Eosinophil % : x  Auto Basophil % : x    10-08    137  |  101  |  42[H]  ----------------------------<  465[HH]  5.8[H]   |  19  |  1.3    Ca    9.5      08 Oct 2024 11:20    TPro  6.7  /  Alb  4.7  /  TBili  0.7  /  DBili  x   /  AST  39  /  ALT  34  /  AlkPhos  137[H]  10-08        Urinalysis Basic - ( 08 Oct 2024 11:20 )    Color: x / Appearance: x / SG: x / pH: x  Gluc: 465 mg/dL / Ketone: x  / Bili: x / Urobili: x   Blood: x / Protein: x / Nitrite: x   Leuk Esterase: x / RBC: x / WBC x   Sq Epi: x / Non Sq Epi: x / Bacteria: x      Neuro exam:   AAOX2. Speech low, intact  follows commands slowly  tongue midline   no facial droop  PERRL   Mild left drift   Motor: MAEx4  weak L hand   lifts b/l UE off bed  moving b/l LE with poor effort  Sensation: intact           Assessment/Plan:  Patient to go to OR for Left craniotomy for evacuation of SDH  Send coags, T&S for preop  Transfuse PRBC's to Hgb >7  Maintain SBP <140- Cardene gtt being initiated in ER  Keppra 1gm load, 500 BID maintenance for AED  Neuro checks q 1hr  Neurocritical care consult  Above d/w Attg

## 2024-10-08 NOTE — ED PROVIDER NOTE - CLINICAL SUMMARY MEDICAL DECISION MAKING FREE TEXT BOX
78 yo woman w/ hx of HTN, DM no ac/antiplt here w/ AMS  found down in apartment  baseline - does all own ADLs, no dementia  On arrival, depressed MS - eye opening to pain, withdrawing. VS unremarkable  CT showed large subdural w/ small shift  NSGY and neuro CC contacted  on return from CT, pt awake, following commands, moving all 4 extrremeties w/ good strength. + confusion    78 yo woman w/ subdural hemorrhage  nsgy, neuro CC and trauma consults  Keppra load  BP elevated on return - will start cardene infusion    Labs show AG w/ elevated blood sugar. Insulin infusion started    Crit care Time (60 min): neuro emergency presentation. IV, O2, Monitor. Labs ordered and interpreted. Imaging interpreted. > 60 min spent at bedside in assessment, management, reassessment and discussion w/ consultants outside of procedures.

## 2024-10-08 NOTE — CONSULT NOTE ADULT - SUBJECTIVE AND OBJECTIVE BOX
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 Per pt's daughter, pt was LKW yesterday evening. Daughter states this morning pt was not answering the phone and did not open the door for her home health aide. Daughter came to home and found her on the floor, pt was conscious and told daughter "I fell". On initial arrival to ED pt only EO to pain, not following commands, WD extremities. Underwent CTH showing R hemispheric SDH 2.2cm w/ mass effect and 1.1cm MLS. On reassessment in ED pt opening eyes to voice, answering some questions and following simple commands. SBP after CT iin 200s, started on Nicardipine gtt. Received 1g IV Keppra, 1850mL LR bolus, and was started on insulin gtt for BP in 400s. Pt assessed by NSGY, going to OR for evacuation. Admitted to NSICU for further management.    VITALS: [x] Reviewed    IMAGING/DATA: [x] Reviewed    IV FLUIDS/MEDICATIONS: [x] Reviewed    ALLERGIES: Allergies    No Known Allergies    Intolerances        EXAMINATION:  General: WN/WD, no acute distress  HENT: NC/AT, moist oral mucosa  Eyes: Anicteric sclerae  Cardiac: [id number]  Lungs: Clear  Abdomen: Soft, non-tender, +BS  Extremities: No c/c/e  Skin/Incision Site: Clean, dry and intact  Neurologic: Lethargic, EO to voice. Oriented to self, hospital. EOMI, PERRL. Face symmetric. 5/5 strength RUE, LUE AG w/ drift. Moving b/l LE in plane of bed. WD throughout        ICU Vital Signs Last 24 Hrs  T(C): 36.7 (08 Oct 2024 13:46), Max: 36.7 (08 Oct 2024 13:46)  T(F): 98 (08 Oct 2024 13:46), Max: 98 (08 Oct 2024 13:46)  HR: 120 (08 Oct 2024 13:46) (109 - 121)  BP: 183/72 (08 Oct 2024 13:46) (147/73 - 200/85)  BP(mean): --  ABP: --  ABP(mean): --  RR: 17 (08 Oct 2024 13:46) (14 - 18)  SpO2: 98% (08 Oct 2024 13:46) (96% - 98%)        chlorhexidine 2% Cloths 1 Application(s) Topical <User Schedule>  insulin regular Infusion 6 Unit(s)/Hr (6 mL/Hr) IV Continuous <Continuous>  niCARdipine Infusion 5 mG/Hr (25 mL/Hr) IV Continuous <Continuous>      LABS:  Na: 137 (10-08 @ 11:20)  K: 5.8 (10-08 @ 11:20)  Cl: 101 (10-08 @ 11:20)  CO2: 19 (10-08 @ 11:20)  BUN: 42 (10-08 @ 11:20)  Cr: 1.3 (10-08 @ 11:20)  Glu: 465(10-08 @ 11:20)    Hgb: 6.8 (10-08 @ 11:20)  Hct: 21.8 (10-08 @ 11:20)  WBC: 24.05 (10-08 @ 11:20)  Plt: 672 (10-08 @ 11:20)        LIVER FUNCTIONS - ( 08 Oct 2024 11:20 )  Alb: 4.7 g/dL / Pro: 6.7 g/dL / ALK PHOS: 137 U/L / ALT: 34 U/L / AST: 39 U/L / GGT: x            SUMMARY: 79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 Per pt's daughter, pt was LKW yesterday evening. Daughter states this morning pt was not answering the phone and did not open the door for her home health aide. Daughter came to home and found her on the floor, pt was conscious and told daughter "I fell". On initial arrival to ED pt only EO to pain, not following commands, WD extremities. Underwent CTH showing R hemispheric SDH 2.2cm w/ mass effect and 1.1cm MLS. On reassessment in ED pt opening eyes to voice, answering some questions and following simple commands. SBP after CT in 200s, started on Nicardipine gtt. Received 1g IV Keppra, 1850mL LR bolus, and was started on insulin gtt for BP in 400s. Pt assessed by NSGY, going to OR for evacuation. Admitted to NSICU for further management.    VITALS: [x] Reviewed    IMAGING/DATA: [x] Reviewed    IV FLUIDS/MEDICATIONS: [x] Reviewed    ALLERGIES: Allergies    No Known Allergies    Intolerances        EXAMINATION:  General: WN/WD, no acute distress  HENT: NC/AT, moist oral mucosa  Eyes: Anicteric sclerae  Cardiac: [id number]  Lungs: Clear  Abdomen: Soft, non-tender, +BS  Extremities: No c/c/e  Skin/Incision Site: Clean, dry and intact  Neurologic: Lethargic, EO to voice. Oriented to self, hospital. EOMI, PERRL. Face symmetric. 5/5 strength RUE, LUE AG w/ drift. Moving b/l LE in plane of bed. WD throughout        ICU Vital Signs Last 24 Hrs  T(C): 36.7 (08 Oct 2024 13:46), Max: 36.7 (08 Oct 2024 13:46)  T(F): 98 (08 Oct 2024 13:46), Max: 98 (08 Oct 2024 13:46)  HR: 120 (08 Oct 2024 13:46) (109 - 121)  BP: 183/72 (08 Oct 2024 13:46) (147/73 - 200/85)  BP(mean): --  ABP: --  ABP(mean): --  RR: 17 (08 Oct 2024 13:46) (14 - 18)  SpO2: 98% (08 Oct 2024 13:46) (96% - 98%)        chlorhexidine 2% Cloths 1 Application(s) Topical <User Schedule>  insulin regular Infusion 6 Unit(s)/Hr (6 mL/Hr) IV Continuous <Continuous>  niCARdipine Infusion 5 mG/Hr (25 mL/Hr) IV Continuous <Continuous>      LABS:  Na: 137 (10-08 @ 11:20)  K: 5.8 (10-08 @ 11:20)  Cl: 101 (10-08 @ 11:20)  CO2: 19 (10-08 @ 11:20)  BUN: 42 (10-08 @ 11:20)  Cr: 1.3 (10-08 @ 11:20)  Glu: 465(10-08 @ 11:20)    Hgb: 6.8 (10-08 @ 11:20)  Hct: 21.8 (10-08 @ 11:20)  WBC: 24.05 (10-08 @ 11:20)  Plt: 672 (10-08 @ 11:20)        LIVER FUNCTIONS - ( 08 Oct 2024 11:20 )  Alb: 4.7 g/dL / Pro: 6.7 g/dL / ALK PHOS: 137 U/L / ALT: 34 U/L / AST: 39 U/L / GGT: x

## 2024-10-08 NOTE — CONSULT NOTE ADULT - ASSESSMENT
ASSESSMENT/PLAN:     NEURO:  q1h neurochecks  Activity: [] OOB as tolerated [] Bedrest [] PT [] OT [] PMNR    PULM:  Incentive spirometry    CV:  -160  Nicardipine    RENAL:    Monitor I&Os    GI:  NPO  GI prophylaxis [] not indicated [] PPI [] pepcid  Bowel regimen [] miralax [] senna [] other:    ENDO:   Goal -180    HEME/ONC:  VTE prophylaxis: [x] SCDs [] chemoprophylaxis [x] hold chemoprophylaxis due to: SDH    ID:  Symone-op antibiotics    MISC:    SOCIAL/FAMILY:  [] awaiting [x] updated at bedside [] family meeting    CODE STATUS:  [x] Full Code [] DNR [] DNI [] Palliative/Comfort Care    DISPOSITION:  [x] ICU [] Stroke Unit [] Floor [] CEU [] Tele ASSESSMENT/PLAN:  79F w/ PMHx of MDS, DM, HTN, CKD presents after fall at home, found to have R SDH. Going to OR for evacuation    NEURO:  q1h neurochecks  Seizure PPx- Keppra 500mg q12h  NSGY following- OR for evacuation  Post-op CTH  Activity: [] OOB as tolerated [] Bedrest [] PT [] OT [] PMNR    PULM:  HOB>30  Incentive spirometry  Aspiration precautions    CV:  -160  Nicardipine  Takes Lisinopril, Nifedipine at home    RENAL:  IVF  Monitor I&Os    GI:  NPO  GI prophylaxis [] not indicated [] PPI [] pepcid  Bowel regimen [] miralax [] senna [] other:    ENDO:   Goal -180  Elevated BG/AG on arrival  Normal B-hydroxybutyrate  Started on insulin gtt in ED, repeat FS and transition to SQ  Hold glimepiride, jardiance, pioglitazone    HEME/ONC:  VTE prophylaxis: [x] SCDs [] chemoprophylaxis [x] hold chemoprophylaxis due to: SDH    ID:  Symone-op antibiotics  F/u blood cx    MISC:    SOCIAL/FAMILY:  [] awaiting [x] updated at bedside [] family meeting    CODE STATUS:  [x] Full Code [] DNR [] DNI [] Palliative/Comfort Care    DISPOSITION:  [x] ICU [] Stroke Unit [] Floor [] CEU [] Tele

## 2024-10-08 NOTE — CONSULT NOTE ADULT - SUBJECTIVE AND OBJECTIVE BOX
TRAUMA ACTIVATION LEVEL:  CODE / ALERT  / CONSULT  ACTIVATED BY: EMS**  /  ED**  INTUBATED: YES** / NO**      MECHANISM OF INJURY:   [] Blunt     [] MVC	  [x] Fall	  [] Pedestrian Struck	  [] Motorcycle     [] Assault     [] Bicycle collision    [] Sports injury    [] Penetrating    [] Gun Shot Wound      [] Stab Wound    GCS: 15 	E: 4	V: 5	M: 6    HPI:    79yF w/ PMHx of DM, HTN and Myelodysplastic syndrome seen as (Trauma Consult) s/p fall +HT, ?loc, -ac. Patient reports she was walking to the door when she tripped and fell, it was an unwitnessed fall but patient was unable to ambulate afterwards. Patient was brought in by ambulance to the ED, PANSCAN was performed and Large  SDH was noted.   Trauma assessment in ED: ABCs intact , GCS 15 , AAOx2.    Fatigue: How much time during the previous 4 weeks did you feel tired?   All or most of the time [   ] Yes (1pt)    [x  ] No  (0pts)  Resistance: Do you have any difficulty walking up 10 steps alone without resting and without aids? [ x  ] Yes (1pt)    [  ] No  (0pts)  Ambulation: Do you have any difficulty walking several hundred yards alone without aids? [  x ] Yes (1pt)    [  ] No  (0pts)  Illness: how many illnesses do you have out of list of 11 total? [   ] 5 or more (1pt) [x  ] < 5 (0pts)  Loss of weight: Have you had weight loss of 5% or more? [   ] Yes (1pt)    [ x ] No  (0pts)    Total Score: 2    Score 1-2: Consult medical comangement  Score 3-4: Consult Geriatric service   Score 5: Consult Palliative service [phone]    Haroon Hartman G, Krish QUEVEDO, Daysi REDDY, Ninoska B. Frality: toward a clinical definition. J AM Med Dir Assoc. 2008; 9 (2): 71-72  Cleve JE, Candelario TK, áSnchez DK. A simple frailty questionnaire (FRAIL) predicts outcomes in middle aged  Americans. J Nutr Health Aging. 2012; 16 (7): 601-608    PAST MEDICAL & SURGICAL HISTORY:  DM (diabetes mellitus)      MDS (myelodysplastic syndrome)      H/O colonoscopy          Allergies    No Known Allergies    Intolerances        Home Medications:  glimepiride 2 mg oral tablet: 1 tab(s) orally 2 times a day (23 May 2024 15:17)  hydrALAZINE 10 mg oral tablet: 1 tab(s) orally every 8 hours (23 May 2024 15:16)  pioglitazone 30 mg oral tablet: 1 tab(s) orally once a day (23 May 2024 15:17)  Tradjenta 5 mg oral tablet: 1 tab(s) orally once a day (23 May 2024 15:17)      ROS: 10-system review is otherwise negative except HPI above.      Primary Survey:    A - airway intact  B - bilateral breath sounds and good chest rise  C - palpable pulses in all extremities  D - GCS 15 on arrival, SEVERINO  Exposure obtained    Vital Signs Last 24 Hrs  T(C): 36.7 (08 Oct 2024 13:46), Max: 36.7 (08 Oct 2024 13:46)  T(F): 98 (08 Oct 2024 13:46), Max: 98 (08 Oct 2024 13:46)  HR: 120 (08 Oct 2024 13:46) (109 - 121)  BP: 183/72 (08 Oct 2024 13:46) (147/73 - 200/85)  BP(mean): --  RR: 17 (08 Oct 2024 13:46) (14 - 18)  SpO2: 98% (08 Oct 2024 13:46) (96% - 98%)    Parameters below as of 08 Oct 2024 13:46  Patient On (Oxygen Delivery Method): room air        Secondary Survey:   General: NAD  HEENT: Normocephalic, atraumatic, EOMI, PEERLA. no scalp lacerations   Neck: Soft, midline trachea. no c-spine tenderness  Chest: No chest wall tenderness, no subcutaneous emphysema   Cardiac: S1, S2, RRR  Respiratory: Bilateral breath sounds, clear and equal bilaterally  Abdomen: Soft, non-distended, non-tender, no rebound, no guarding.  Groin: Normal appearing, pelvis stable   Ext:  Moving b/l upper and lower extremities. Palpable Radial b/l UE, b/l DP palpable in LE.   Back: No T/L/S spine tenderness, No palpable runoff/stepoff/deformity      ACCESS / DEVICES:  [x ] Peripheral IV  [ ] Central Venous Line	[ ] R	[ ] L	[ ] IJ	[ ] Fem	[ ] SC	Placed:   [ ] Arterial Line		[ ] R	[ ] L	[ ] Fem	[ ] Rad	[ ] Ax	Placed:   [ ] PICC:					[ ] Mediport  [ ] Urinary Catheter,  Date Placed:   [ ] Chest tube: [ ] Right, [ ] Left  [ ] NOBLE/Vicente Drains    Labs:  CAPILLARY BLOOD GLUCOSE      POCT Blood Glucose.: 378 mg/dL (08 Oct 2024 11:12)  POCT Blood Glucose.: 388 mg/dL (08 Oct 2024 10:58)                          6.8    24.05 )-----------( 672      ( 08 Oct 2024 11:20 )             21.8       Auto Neutrophil %: 86.1 % (10-08-24 @ 11:20)    10-08    137  |  101  |  42[H]  ----------------------------<  465[HH]  5.8[H]   |  19  |  1.3      Calcium: 9.5 mg/dL (10-08-24 @ 11:20)      LFTs:             6.7  | 0.7  | 39       ------------------[137     ( 08 Oct 2024 11:20 )  4.7  | x    | 34          Lipase:x      Amylase:x         Lactate, Blood: 4.4 mmol/L (10-08-24 @ 11:20)      Coags:            Urinalysis Basic - ( 08 Oct 2024 11:20 )    Color: x / Appearance: x / SG: x / pH: x  Gluc: 465 mg/dL / Ketone: x  / Bili: x / Urobili: x   Blood: x / Protein: x / Nitrite: x   Leuk Esterase: x / RBC: x / WBC x   Sq Epi: x / Non Sq Epi: x / Bacteria: x                RADIOLOGY & ADDITIONAL STUDIES:  ---------------------------------------------------------------------------------------  < from: CT Cervical Spine No Cont (10.08.24 @ 11:55) >    CT HEAD:  Large acute right hemispheric subdural hematoma with mass effect and   leftward midline shift approximately 1.1 cm.    Diffuse sclerotic changes of the calvarium with areas of internal lucency   potentially reflecting infiltrative marrow process.    CT CERVICAL SPINE:  No acute fracture or subluxation.    These findings were discussed with Dr. JONES TAMEZ [phone] at   10/8/2024 12:02 PM by Dr. Castellanos withread back confirmation.      < end of copied text >  < from: CT Abdomen and Pelvis w/ IV Cont (10.08.24 @ 12:08) >  IMPRESSION:    No evidence of acute intrathoracic/abdominal or pelvic pathology.    Trace bilateral pleural effusions.    Cholelithiasis.    --- End of Report ---        < end of copied text >

## 2024-10-08 NOTE — ED PROVIDER NOTE - PHYSICAL EXAMINATION
VITAL SIGNS: I have reviewed nursing notes and confirm.  CONSTITUTIONAL: ill-appearing.   SKIN: Cool to touch. No abrasions or lesions.   HEAD: NCAT  EYES: PERRL  ENT: Moist mucous membranes, normal pharynx with no erythema or exudates  NECK: Supple  CARD: RRR, no murmurs, rubs or gallops  RESP: clear to ausculation b/l.  No rales, rhonchi, or wheezing.  ABD: soft, non-tender, non-distended  EXT: Full ROM  NEURO: Withdraws to painful stimuli.   PSYCH: Cooperative, appropriate.

## 2024-10-08 NOTE — ED ADULT NURSE NOTE - NS ED PATIENT SAFETY CONCERN
[FreeTextEntry1] : Exam demonstrates findings consistent with intertriginous candidiasis of the groin bilaterally  Noncompliant in recommendations for follow-up on previously detected iron deficiency anemia noted back in October 2023 Advised. He was at the to start on iron supplementation and follow-up with me in 3 months but never did He needs blood work to follow-up on this  EKG obtained in office today demonstrates tachycardia with a heart rate of 163 and some ST/T wave changes Given his substernal chest pain and shortness of breath which is exertional I am recommending he go to the emergency room soon as possible for rule out ACS Recommend transfer to the ER via ambulance but patient declines this recommendation Risks associated with failure to comply with these recommendations include sudden cardiac death and these risks were discussed with patient in detail he continues to decline transfer via ambulance and will go himself.
No

## 2024-10-08 NOTE — PROGRESS NOTE ADULT - SUBJECTIVE AND OBJECTIVE BOX
Subjective: 79yFemale with a pmhx of Traumatic subdural hemorrhage without loss of consciousness, initial encounter        NOMD    NOMD    NOMD    NOMD    NOMD    Abnormal finding of blood chemistry, unspecified    Acute kidney failure, unspecified    Anemia, unspecified    Chronic cough    Chronic kidney disease, unspecified    Cough, unspecified    Cutaneous abscess of back [any part, except buttock]    Cutaneous abscess of limb, unspecified    Deficiency of other specified B group vitamins    Diarrhea, unspecified    Encounter for antineoplastic chemotherapy    Essential (primary) hypertension    FALL (ON) (FROM) UNSPECIFIED STAIRS AND STEPS, INIT ENCNTR    Fever, unspecified    History of falling    Hypomagnesemia    Long term (current) use of oral hypoglycemic drugs    Myelodysplastic syndrome, unspecified    Other drug-induced agranulocytosis    Other long term (current) drug therapy    Other specified disorders of nose and nasal sinuses    Pain in right knee    Pain in unspecified knee    Personal history of diseases of the blood and blood-forming organs and certain disorders involving the immune mechanism    Personal history of other endocrine, nutritional and metabolic disease    Personal history of other medical treatment    Personal history of other specified conditions    Pneumonia, unspecified organism    Shortness of breath    Synovial cyst of popliteal space [Rios], right knee    Thrombocytopenia, unspecified    Thrombocytosis, unspecified    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 disease    Unspecified abdominal pain    Unspecified fall, initial encounter    Unspecified place or not applicable    Chronic kidney disease, stage 3b    Disorder of kidney and ureter, unspecified    Irritable bowel syndrome with diarrhea    No pertinent family history in first degree relatives    No pertinent family history in first degree relatives    No pertinent family history in first degree relatives    Handoff    MEWS Score    DM (diabetes mellitus)    MDS (myelodysplastic syndrome)    Traumatic subdural hemorrhage with brief coma    Craniotomy, emergent, for subdural hematoma evacuation    No significant past surgical history    H/O colonoscopy    FALL HIT HEAD    90+    SysAdmin_VstLnk      s/p     Allergies    No Known Allergies    Intolerances        Vital Signs Last 24 Hrs  T(C): 36.4 (08 Oct 2024 18:51), Max: 36.7 (08 Oct 2024 13:46)  T(F): 97.5 (08 Oct 2024 18:51), Max: 98 (08 Oct 2024 13:46)  HR: 77 (08 Oct 2024 22:15) (77 - 121)  BP: 128/62 (08 Oct 2024 22:15) (128/62 - 200/85)  BP(mean): 93 (08 Oct 2024 21:45) (81 - 98)  RR: 12 (08 Oct 2024 22:15) (12 - 18)  SpO2: 100% (08 Oct 2024 22:15) (96% - 100%)      ceFAZolin   IVPB 2000 milliGRAM(s) IV Intermittent every 8 hours  chlorhexidine 0.12% Liquid 15 milliLiter(s) Oral Mucosa every 12 hours  chlorhexidine 0.12% Liquid 15 milliLiter(s) Oral Mucosa every 12 hours  chlorhexidine 2% Cloths 1 Application(s) Topical <User Schedule>  dextrose 50% Injectable 50 milliLiter(s) IV Push every 15 minutes  dextrose 50% Injectable 25 milliLiter(s) IV Push every 15 minutes  dextrose 50% Injectable 25 milliLiter(s) IV Push every 15 minutes  insulin lispro (ADMELOG) corrective regimen sliding scale   SubCutaneous every 4 hours  insulin regular Infusion 0.5 Unit(s)/Hr IV Continuous <Continuous>  insulin regular Infusion 1.5 Unit(s)/Hr IV Continuous <Continuous>  levETIRAcetam  IVPB 1000 milliGRAM(s) IV Intermittent every 12 hours  magnesium sulfate  IVPB 2 Gram(s) IV Intermittent every 2 hours  niCARdipine Infusion 2.5 mG/Hr IV Continuous <Continuous>  niCARdipine Infusion 5 mG/Hr IV Continuous <Continuous>  pantoprazole  Injectable 40 milliGRAM(s) IV Push daily  propofol Infusion 10 MICROgram(s)/kG/Min IV Continuous <Continuous>  propofol Infusion 100 MICROgram(s)/kG/Min IV Continuous <Continuous>  sodium chloride 0.9%. 1000 milliLiter(s) IV Continuous <Continuous>        10-08-24 @ 07:01  -  10-08-24 @ 23:21  --------------------------------------------------------  IN: 809.1 mL / OUT: 415 mL / NET: 394.1 mL        REVIEW OF SYSTEMS    [ ] A ten-point review of systems was otherwise negative except as noted.  [ ] Due to altered mental status/intubation, subjective information were not able to be obtained from the patient. History was obtained, to the extent possible, from review of the chart and collateral sources of information.      Exam:  AAOX3. Verbal function intact  tongue midline, facial motions symmetric  PERRLA, EOMI  Pronator Drift:   Finger to Nose intact  Motor: MAEx4, 5/5 power in b/l UE and LE  Sensation: intact to touch in all extremities        CBC Full  -  ( 08 Oct 2024 19:48 )  WBC Count : 16.21 K/uL  RBC Count : 3.38 M/uL  Hemoglobin : 10.3 g/dL  Hematocrit : 30.8 %  Platelet Count - Automated : 293 K/uL  Mean Cell Volume : 91.1 fL  Mean Cell Hemoglobin : 30.5 pg  Mean Cell Hemoglobin Concentration : 33.4 g/dL  Auto Neutrophil # : x  Auto Lymphocyte # : x  Auto Monocyte # : x  Auto Eosinophil # : x  Auto Basophil # : x  Auto Neutrophil % : x  Auto Lymphocyte % : x  Auto Monocyte % : x  Auto Eosinophil % : x  Auto Basophil % : x    10-08    144  |  114[H]  |  34[H]  ----------------------------<  155[H]  5.4[H]   |  17  |  1.1    Ca    9.0      08 Oct 2024 19:48  Phos  4.5     10-08  Mg     1.6     10-08    TPro  5.4[L]  /  Alb  4.0  /  TBili  2.6[H]  /  DBili  x   /  AST  34  /  ALT  33  /  AlkPhos  93  10-08    PT/INR - ( 08 Oct 2024 19:50 )   PT: 13.60 sec;   INR: 1.19 ratio         PTT - ( 08 Oct 2024 19:50 )  PTT:30.9 sec        Wound:    Imaging:    Assessment/Plan:        Subjective: 79yFemale with a pmhx of Traumatic subdural hemorrhage without loss of consciousness, initial encounter    POD#0 s/p Right craniotomy for evac of SDH  Pt seen and examined at bedside in PACU. Pt remains intubated, sedated on Propofol. BP control with Cardene; Fentanyl 25 mcg push x 1 dose.     NOMD    NOMD    NOMD    NOMD    NOMD    Abnormal finding of blood chemistry, unspecified    Acute kidney failure, unspecified    Anemia, unspecified    Chronic cough    Chronic kidney disease, unspecified    Cough, unspecified    Cutaneous abscess of back [any part, except buttock]    Cutaneous abscess of limb, unspecified    Deficiency of other specified B group vitamins    Diarrhea, unspecified    Encounter for antineoplastic chemotherapy    Essential (primary) hypertension    FALL (ON) (FROM) UNSPECIFIED STAIRS AND STEPS, INIT ENCNTR    Fever, unspecified    History of falling    Hypomagnesemia    Long term (current) use of oral hypoglycemic drugs    Myelodysplastic syndrome, unspecified    Other drug-induced agranulocytosis    Other long term (current) drug therapy    Other specified disorders of nose and nasal sinuses    Pain in right knee    Pain in unspecified knee    Personal history of diseases of the blood and blood-forming organs and certain disorders involving the immune mechanism    Personal history of other endocrine, nutritional and metabolic disease    Personal history of other medical treatment    Personal history of other specified conditions    Pneumonia, unspecified organism    Shortness of breath    Synovial cyst of popliteal space [Rios], right knee    Thrombocytopenia, unspecified    Thrombocytosis, unspecified    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 disease    Unspecified abdominal pain    Unspecified fall, initial encounter    Unspecified place or not applicable    Chronic kidney disease, stage 3b    Disorder of kidney and ureter, unspecified    Irritable bowel syndrome with diarrhea    No pertinent family history in first degree relatives    No pertinent family history in first degree relatives    No pertinent family history in first degree relatives    Handoff    MEWS Score    DM (diabetes mellitus)    MDS (myelodysplastic syndrome)    Traumatic subdural hemorrhage with brief coma    Craniotomy, emergent, for subdural hematoma evacuation    No significant past surgical history    H/O colonoscopy    FALL HIT HEAD    90+    SysAdmin_VstLnk      Allergies    No Known Allergies    Intolerances        Vital Signs Last 24 Hrs  T(C): 36.4 (08 Oct 2024 18:51), Max: 36.7 (08 Oct 2024 13:46)  T(F): 97.5 (08 Oct 2024 18:51), Max: 98 (08 Oct 2024 13:46)  HR: 77 (08 Oct 2024 22:15) (77 - 121)  BP: 128/62 (08 Oct 2024 22:15) (128/62 - 200/85)  BP(mean): 93 (08 Oct 2024 21:45) (81 - 98)  RR: 12 (08 Oct 2024 22:15) (12 - 18)  SpO2: 100% (08 Oct 2024 22:15) (96% - 100%)      ceFAZolin   IVPB 2000 milliGRAM(s) IV Intermittent every 8 hours  chlorhexidine 0.12% Liquid 15 milliLiter(s) Oral Mucosa every 12 hours  chlorhexidine 0.12% Liquid 15 milliLiter(s) Oral Mucosa every 12 hours  chlorhexidine 2% Cloths 1 Application(s) Topical <User Schedule>  dextrose 50% Injectable 50 milliLiter(s) IV Push every 15 minutes  dextrose 50% Injectable 25 milliLiter(s) IV Push every 15 minutes  dextrose 50% Injectable 25 milliLiter(s) IV Push every 15 minutes  insulin lispro (ADMELOG) corrective regimen sliding scale   SubCutaneous every 4 hours  insulin regular Infusion 0.5 Unit(s)/Hr IV Continuous <Continuous>  insulin regular Infusion 1.5 Unit(s)/Hr IV Continuous <Continuous>  levETIRAcetam  IVPB 1000 milliGRAM(s) IV Intermittent every 12 hours  magnesium sulfate  IVPB 2 Gram(s) IV Intermittent every 2 hours  niCARdipine Infusion 2.5 mG/Hr IV Continuous <Continuous>  niCARdipine Infusion 5 mG/Hr IV Continuous <Continuous>  pantoprazole  Injectable 40 milliGRAM(s) IV Push daily  propofol Infusion 10 MICROgram(s)/kG/Min IV Continuous <Continuous>  propofol Infusion 100 MICROgram(s)/kG/Min IV Continuous <Continuous>  sodium chloride 0.9%. 1000 milliLiter(s) IV Continuous <Continuous>        10-08-24 @ 07:01  -  10-08-24 @ 23:21  --------------------------------------------------------  IN: 809.1 mL / OUT: 415 mL / NET: 394.1 mL        REVIEW OF SYSTEMS    [ ] A ten-point review of systems was otherwise negative except as noted.  [x ] Due to altered mental status/intubation, subjective information were not able to be obtained from the patient. History was obtained, to the extent possible, from review of the chart and collateral sources of information.      Exam:  Pt remains intubated and sedated on propofol  limited exam 2/2 sedation  No cough/gag  No w/d b/l UE or LE to noxious  Wound: Dressing c/d/i         CBC Full  -  ( 08 Oct 2024 19:48 )  WBC Count : 16.21 K/uL  RBC Count : 3.38 M/uL  Hemoglobin : 10.3 g/dL  Hematocrit : 30.8 %  Platelet Count - Automated : 293 K/uL  Mean Cell Volume : 91.1 fL  Mean Cell Hemoglobin : 30.5 pg  Mean Cell Hemoglobin Concentration : 33.4 g/dL  Auto Neutrophil # : x  Auto Lymphocyte # : x  Auto Monocyte # : x  Auto Eosinophil # : x  Auto Basophil # : x  Auto Neutrophil % : x  Auto Lymphocyte % : x  Auto Monocyte % : x  Auto Eosinophil % : x  Auto Basophil % : x    10-08    144  |  114[H]  |  34[H]  ----------------------------<  155[H]  5.4[H]   |  17  |  1.1    Ca    9.0      08 Oct 2024 19:48  Phos  4.5     10-08  Mg     1.6     10-08    TPro  5.4[L]  /  Alb  4.0  /  TBili  2.6[H]  /  DBili  x   /  AST  34  /  ALT  33  /  AlkPhos  93  10-08    PT/INR - ( 08 Oct 2024 19:50 )   PT: 13.60 sec;   INR: 1.19 ratio         PTT - ( 08 Oct 2024 19:50 )  PTT:30.9 sec      Imaging:  < from: CT Head No Cont (10.08.24 @ 18:43) >  IMPRESSION:    Interval right frontal-temporal-parietal craniotomy for evacuation of   subdural hemorrhage. Decrease left midline shift.    --- End of Report ---          < end of copied text >      Assessment/Plan:   - q1 neurochecks  - SBP<130  - F/u Sub-galeal NOBLE O/P     - CTH 10/9 @ 6am  - Keppra 1g q12  - Care per Neurocritical Care  - D/W attending

## 2024-10-09 ENCOUNTER — RESULT REVIEW (OUTPATIENT)
Age: 79
End: 2024-10-09

## 2024-10-09 LAB
A1C WITH ESTIMATED AVERAGE GLUCOSE RESULT: 6 % — HIGH (ref 4–5.6)
ALBUMIN SERPL ELPH-MCNC: 4.1 G/DL — SIGNIFICANT CHANGE UP (ref 3.5–5.2)
ALP SERPL-CCNC: 102 U/L — SIGNIFICANT CHANGE UP (ref 30–115)
ALT FLD-CCNC: 30 U/L — SIGNIFICANT CHANGE UP (ref 0–41)
ANION GAP SERPL CALC-SCNC: 12 MMOL/L — SIGNIFICANT CHANGE UP (ref 7–14)
ANION GAP SERPL CALC-SCNC: 14 MMOL/L — SIGNIFICANT CHANGE UP (ref 7–14)
APPEARANCE UR: CLEAR — SIGNIFICANT CHANGE UP
AST SERPL-CCNC: 23 U/L — SIGNIFICANT CHANGE UP (ref 0–41)
BACTERIA # UR AUTO: NEGATIVE /HPF — SIGNIFICANT CHANGE UP
BASOPHILS # BLD AUTO: 0.05 K/UL — SIGNIFICANT CHANGE UP (ref 0–0.2)
BASOPHILS NFR BLD AUTO: 0.4 % — SIGNIFICANT CHANGE UP (ref 0–1)
BILIRUB SERPL-MCNC: 1.2 MG/DL — SIGNIFICANT CHANGE UP (ref 0.2–1.2)
BILIRUB UR-MCNC: NEGATIVE — SIGNIFICANT CHANGE UP
BUN SERPL-MCNC: 38 MG/DL — HIGH (ref 10–20)
BUN SERPL-MCNC: 40 MG/DL — HIGH (ref 10–20)
CALCIUM SERPL-MCNC: 9 MG/DL — SIGNIFICANT CHANGE UP (ref 8.4–10.4)
CALCIUM SERPL-MCNC: 9 MG/DL — SIGNIFICANT CHANGE UP (ref 8.4–10.5)
CAST: 12 /LPF — HIGH (ref 0–4)
CHLORIDE SERPL-SCNC: 111 MMOL/L — HIGH (ref 98–110)
CHLORIDE SERPL-SCNC: 113 MMOL/L — HIGH (ref 98–110)
CO2 SERPL-SCNC: 17 MMOL/L — SIGNIFICANT CHANGE UP (ref 17–32)
CO2 SERPL-SCNC: 17 MMOL/L — SIGNIFICANT CHANGE UP (ref 17–32)
COLOR SPEC: YELLOW — SIGNIFICANT CHANGE UP
CREAT SERPL-MCNC: 1.3 MG/DL — SIGNIFICANT CHANGE UP (ref 0.7–1.5)
CREAT SERPL-MCNC: 1.5 MG/DL — SIGNIFICANT CHANGE UP (ref 0.7–1.5)
DIFF PNL FLD: NEGATIVE — SIGNIFICANT CHANGE UP
EGFR: 35 ML/MIN/1.73M2 — LOW
EGFR: 42 ML/MIN/1.73M2 — LOW
EOSINOPHIL # BLD AUTO: 0 K/UL — SIGNIFICANT CHANGE UP (ref 0–0.7)
EOSINOPHIL NFR BLD AUTO: 0 % — SIGNIFICANT CHANGE UP (ref 0–8)
ESTIMATED AVERAGE GLUCOSE: 126 MG/DL — HIGH (ref 68–114)
GAS PNL BLDA: SIGNIFICANT CHANGE UP
GLUCOSE BLDC GLUCOMTR-MCNC: 116 MG/DL — HIGH (ref 70–99)
GLUCOSE BLDC GLUCOMTR-MCNC: 163 MG/DL — HIGH (ref 70–99)
GLUCOSE BLDC GLUCOMTR-MCNC: 191 MG/DL — HIGH (ref 70–99)
GLUCOSE BLDC GLUCOMTR-MCNC: 254 MG/DL — HIGH (ref 70–99)
GLUCOSE BLDC GLUCOMTR-MCNC: 256 MG/DL — HIGH (ref 70–99)
GLUCOSE BLDC GLUCOMTR-MCNC: 87 MG/DL — SIGNIFICANT CHANGE UP (ref 70–99)
GLUCOSE SERPL-MCNC: 219 MG/DL — HIGH (ref 70–99)
GLUCOSE SERPL-MCNC: 260 MG/DL — HIGH (ref 70–99)
GLUCOSE UR QL: NEGATIVE MG/DL — SIGNIFICANT CHANGE UP
HCT VFR BLD CALC: 30.8 % — LOW (ref 37–47)
HGB BLD-MCNC: 10.8 G/DL — LOW (ref 12–16)
IMM GRANULOCYTES NFR BLD AUTO: 1.3 % — HIGH (ref 0.1–0.3)
KETONES UR-MCNC: NEGATIVE MG/DL — SIGNIFICANT CHANGE UP
LEUKOCYTE ESTERASE UR-ACNC: NEGATIVE — SIGNIFICANT CHANGE UP
LYMPHOCYTES # BLD AUTO: 0.58 K/UL — LOW (ref 1.2–3.4)
LYMPHOCYTES # BLD AUTO: 4.8 % — LOW (ref 20.5–51.1)
MAGNESIUM SERPL-MCNC: 2.6 MG/DL — HIGH (ref 1.8–2.4)
MCHC RBC-ENTMCNC: 30.5 PG — SIGNIFICANT CHANGE UP (ref 27–31)
MCHC RBC-ENTMCNC: 35.1 G/DL — SIGNIFICANT CHANGE UP (ref 32–37)
MCV RBC AUTO: 87 FL — SIGNIFICANT CHANGE UP (ref 81–99)
MONOCYTES # BLD AUTO: 1.03 K/UL — HIGH (ref 0.1–0.6)
MONOCYTES NFR BLD AUTO: 8.5 % — SIGNIFICANT CHANGE UP (ref 1.7–9.3)
MRSA PCR RESULT.: SIGNIFICANT CHANGE UP
NEUTROPHILS # BLD AUTO: 10.36 K/UL — HIGH (ref 1.4–6.5)
NEUTROPHILS NFR BLD AUTO: 85 % — HIGH (ref 42.2–75.2)
NITRITE UR-MCNC: NEGATIVE — SIGNIFICANT CHANGE UP
NRBC # BLD: 0 /100 WBCS — SIGNIFICANT CHANGE UP (ref 0–0)
PH UR: 5.5 — SIGNIFICANT CHANGE UP (ref 5–8)
PHOSPHATE SERPL-MCNC: 4.8 MG/DL — SIGNIFICANT CHANGE UP (ref 2.1–4.9)
PLATELET # BLD AUTO: 343 K/UL — SIGNIFICANT CHANGE UP (ref 130–400)
PMV BLD: 9.9 FL — SIGNIFICANT CHANGE UP (ref 7.4–10.4)
POTASSIUM SERPL-MCNC: 4.9 MMOL/L — SIGNIFICANT CHANGE UP (ref 3.5–5)
POTASSIUM SERPL-MCNC: 5.4 MMOL/L — HIGH (ref 3.5–5)
POTASSIUM SERPL-SCNC: 4.9 MMOL/L — SIGNIFICANT CHANGE UP (ref 3.5–5)
POTASSIUM SERPL-SCNC: 5.4 MMOL/L — HIGH (ref 3.5–5)
PROCALCITONIN SERPL-MCNC: 7.3 NG/ML — HIGH (ref 0.02–0.1)
PROT SERPL-MCNC: 5.4 G/DL — LOW (ref 6–8)
PROT UR-MCNC: 30 MG/DL
RBC # BLD: 3.54 M/UL — LOW (ref 4.2–5.4)
RBC # FLD: 14.4 % — SIGNIFICANT CHANGE UP (ref 11.5–14.5)
RBC CASTS # UR COMP ASSIST: 1 /HPF — SIGNIFICANT CHANGE UP (ref 0–4)
S AUREUS DNA NOSE QL NAA+PROBE: SIGNIFICANT CHANGE UP
SODIUM SERPL-SCNC: 142 MMOL/L — SIGNIFICANT CHANGE UP (ref 135–146)
SODIUM SERPL-SCNC: 142 MMOL/L — SIGNIFICANT CHANGE UP (ref 135–146)
SP GR SPEC: >1.03 — HIGH (ref 1–1.03)
SQUAMOUS # UR AUTO: 2 /HPF — SIGNIFICANT CHANGE UP (ref 0–5)
UROBILINOGEN FLD QL: 0.2 MG/DL — SIGNIFICANT CHANGE UP (ref 0.2–1)
WBC # BLD: 12.18 K/UL — HIGH (ref 4.8–10.8)
WBC # FLD AUTO: 12.18 K/UL — HIGH (ref 4.8–10.8)
WBC UR QL: 2 /HPF — SIGNIFICANT CHANGE UP (ref 0–5)

## 2024-10-09 PROCEDURE — 93970 EXTREMITY STUDY: CPT | Mod: 26

## 2024-10-09 PROCEDURE — 99291 CRITICAL CARE FIRST HOUR: CPT | Mod: GC

## 2024-10-09 PROCEDURE — 71045 X-RAY EXAM CHEST 1 VIEW: CPT | Mod: 26

## 2024-10-09 PROCEDURE — 76937 US GUIDE VASCULAR ACCESS: CPT | Mod: 26

## 2024-10-09 PROCEDURE — 75898 FOLLOW-UP ANGIOGRAPHY: CPT | Mod: 26

## 2024-10-09 PROCEDURE — 70450 CT HEAD/BRAIN W/O DYE: CPT | Mod: 26,77

## 2024-10-09 PROCEDURE — 70450 CT HEAD/BRAIN W/O DYE: CPT | Mod: 26

## 2024-10-09 PROCEDURE — 93306 TTE W/DOPPLER COMPLETE: CPT | Mod: 26

## 2024-10-09 PROCEDURE — 75894 X-RAYS TRANSCATH THERAPY: CPT | Mod: 26

## 2024-10-09 PROCEDURE — 61624 TCAT PERM OCCLS/EMBOLJ CNS: CPT | Mod: 78

## 2024-10-09 PROCEDURE — 36227 PLACE CATH XTRNL CAROTID: CPT | Mod: 50

## 2024-10-09 PROCEDURE — 36223 PLACE CATH CAROTID/INOM ART: CPT | Mod: 50,78

## 2024-10-09 RX ORDER — FENTANYL CITRAT/DEXTROSE 5%/PF 1250MCG/50
25 PATIENT CONTROLLED ANALGESIA SYRINGE INTRAVENOUS EVERY 4 HOURS
Refills: 0 | Status: DISCONTINUED | OUTPATIENT
Start: 2024-10-09 | End: 2024-10-12

## 2024-10-09 RX ORDER — LABETALOL HCL 200 MG
10 TABLET ORAL ONCE
Refills: 0 | Status: COMPLETED | OUTPATIENT
Start: 2024-10-09 | End: 2024-10-09

## 2024-10-09 RX ORDER — CHLORHEXIDINE GLUCONATE 40 MG/ML
15 SOLUTION TOPICAL EVERY 12 HOURS
Refills: 0 | Status: DISCONTINUED | OUTPATIENT
Start: 2024-10-09 | End: 2024-10-09

## 2024-10-09 RX ORDER — FAMOTIDINE 10 MG/ML
20 INJECTION INTRAVENOUS DAILY
Refills: 0 | Status: DISCONTINUED | OUTPATIENT
Start: 2024-10-09 | End: 2024-10-14

## 2024-10-09 RX ORDER — HYDRALAZINE HYDROCHLORIDE 50 MG/1
5 TABLET, FILM COATED ORAL ONCE
Refills: 0 | Status: COMPLETED | OUTPATIENT
Start: 2024-10-09 | End: 2024-10-09

## 2024-10-09 RX ORDER — POLYETHYLENE GLYCOL 3350 17 G/17G
17 POWDER, FOR SOLUTION ORAL DAILY
Refills: 0 | Status: DISCONTINUED | OUTPATIENT
Start: 2024-10-09 | End: 2024-10-27

## 2024-10-09 RX ORDER — FENTANYL CITRAT/DEXTROSE 5%/PF 1250MCG/50
25 PATIENT CONTROLLED ANALGESIA SYRINGE INTRAVENOUS ONCE
Refills: 0 | Status: DISCONTINUED | OUTPATIENT
Start: 2024-10-09 | End: 2024-10-09

## 2024-10-09 RX ORDER — LEVETIRACETAM 500 MG/1
750 TABLET, FILM COATED ORAL EVERY 12 HOURS
Refills: 0 | Status: DISCONTINUED | OUTPATIENT
Start: 2024-10-09 | End: 2024-10-10

## 2024-10-09 RX ORDER — LISINOPRIL 40 MG
10 TABLET ORAL DAILY
Refills: 0 | Status: DISCONTINUED | OUTPATIENT
Start: 2024-10-09 | End: 2024-10-09

## 2024-10-09 RX ORDER — SENNA 187 MG
2 TABLET ORAL AT BEDTIME
Refills: 0 | Status: DISCONTINUED | OUTPATIENT
Start: 2024-10-09 | End: 2024-10-27

## 2024-10-09 RX ORDER — DEXMEDETOMIDINE HYDROCHLORIDE 400 UG/100ML
0.5 INJECTION, SOLUTION INTRAVENOUS
Qty: 400 | Refills: 0 | Status: DISCONTINUED | OUTPATIENT
Start: 2024-10-09 | End: 2024-10-15

## 2024-10-09 RX ORDER — SODIUM BICARBONATE 1 MEQ/ML
50 VIAL (ML) INTRAVENOUS ONCE
Refills: 0 | Status: COMPLETED | OUTPATIENT
Start: 2024-10-09 | End: 2024-10-09

## 2024-10-09 RX ORDER — INSULIN REG, HUM S-S BUFF 100/ML
4 VIAL (ML) INJECTION ONCE
Refills: 0 | Status: COMPLETED | OUTPATIENT
Start: 2024-10-09 | End: 2024-10-09

## 2024-10-09 RX ORDER — LEVETIRACETAM 500 MG/1
750 TABLET, FILM COATED ORAL EVERY 12 HOURS
Refills: 0 | Status: DISCONTINUED | OUTPATIENT
Start: 2024-10-09 | End: 2024-10-09

## 2024-10-09 RX ADMIN — CHLORHEXIDINE GLUCONATE 1 APPLICATION(S): 40 SOLUTION TOPICAL at 06:47

## 2024-10-09 RX ADMIN — PANTOPRAZOLE SODIUM 40 MILLIGRAM(S): 40 TABLET, DELAYED RELEASE ORAL at 11:20

## 2024-10-09 RX ADMIN — LEVETIRACETAM 750 MILLIGRAM(S): 500 TABLET, FILM COATED ORAL at 18:42

## 2024-10-09 RX ADMIN — HYDRALAZINE HYDROCHLORIDE 5 MILLIGRAM(S): 50 TABLET, FILM COATED ORAL at 07:05

## 2024-10-09 RX ADMIN — Medication 100 MILLIGRAM(S): at 14:59

## 2024-10-09 RX ADMIN — Medication 25 MICROGRAM(S): at 07:44

## 2024-10-09 RX ADMIN — Medication 100 MILLIGRAM(S): at 06:46

## 2024-10-09 RX ADMIN — Medication 2: at 10:06

## 2024-10-09 RX ADMIN — Medication 2: at 22:45

## 2024-10-09 RX ADMIN — CHLORHEXIDINE GLUCONATE 15 MILLILITER(S): 40 SOLUTION TOPICAL at 18:43

## 2024-10-09 RX ADMIN — HYDRALAZINE HYDROCHLORIDE 5 MILLIGRAM(S): 50 TABLET, FILM COATED ORAL at 19:48

## 2024-10-09 RX ADMIN — Medication 25 MICROGRAM(S): at 12:44

## 2024-10-09 RX ADMIN — Medication 10 MILLIGRAM(S): at 02:48

## 2024-10-09 RX ADMIN — Medication 25 MICROGRAM(S): at 02:30

## 2024-10-09 RX ADMIN — Medication 25 MICROGRAM(S): at 23:36

## 2024-10-09 RX ADMIN — LEVETIRACETAM 400 MILLIGRAM(S): 500 TABLET, FILM COATED ORAL at 06:47

## 2024-10-09 RX ADMIN — POLYETHYLENE GLYCOL 3350 17 GRAM(S): 17 POWDER, FOR SOLUTION ORAL at 18:42

## 2024-10-09 RX ADMIN — Medication 6: at 02:50

## 2024-10-09 RX ADMIN — Medication 50 MILLIEQUIVALENT(S): at 07:00

## 2024-10-09 RX ADMIN — Medication 25 MICROGRAM(S): at 01:49

## 2024-10-09 RX ADMIN — Medication 6: at 06:42

## 2024-10-09 RX ADMIN — Medication 25 MG/HR: at 11:20

## 2024-10-09 RX ADMIN — Medication 4 UNIT(S): at 07:00

## 2024-10-09 RX ADMIN — Medication 25 MICROGRAM(S): at 07:29

## 2024-10-09 RX ADMIN — Medication 25 MICROGRAM(S): at 12:59

## 2024-10-09 RX ADMIN — CHLORHEXIDINE GLUCONATE 15 MILLILITER(S): 40 SOLUTION TOPICAL at 06:46

## 2024-10-09 RX ADMIN — Medication 2 TABLET(S): at 22:45

## 2024-10-09 NOTE — DIETITIAN INITIAL EVALUATION ADULT - ENTERAL
When medically feasible to initiate EN, if still on propofol, would recommend Vital HP (lower fat formula while on propofol). Start feeds at 20 mL and advance 10 mL q4hrs until goal rate of 55 mL x 18 hrs is reached.  This will provide: 990 kcal, 86.6 gm Protein, 831.6 mL free water from formula + recommend 100 mL q6hrs = 1231.6 mL total water (or per ICU team)    If patient is off of propofol, would recommend Peptamen AF formula starting at 20 mL and advancing 10 mL q4hrs until goal rate of 50 mL x 18 hrs is reached.  This will provide: 900 mL total volume, 1080 kcal, 70.2 gm Protein, 729 mL free water form formula + recommend 100mL q4hrs = 1329 mL total water

## 2024-10-09 NOTE — DIETITIAN INITIAL EVALUATION ADULT - NAME AND PHONE
Mercedes Forrester, RD [phone] or via Teams    Patient is at high nutrition risk, RD to f/u in 3-5 days or PRN

## 2024-10-09 NOTE — DIETITIAN INITIAL EVALUATION ADULT - PERTINENT MEDS FT
MEDICATIONS  (STANDING):  chlorhexidine 0.12% Liquid 15 milliLiter(s) Oral Mucosa every 12 hours  chlorhexidine 2% Cloths 1 Application(s) Topical <User Schedule>  dexMEDEtomidine Infusion 0.2 MICROgram(s)/kG/Hr (3 mL/Hr) IV Continuous <Continuous>  dextrose 50% Injectable 50 milliLiter(s) IV Push every 15 minutes  dextrose 50% Injectable 25 milliLiter(s) IV Push every 15 minutes  dextrose 50% Injectable 25 milliLiter(s) IV Push every 15 minutes  famotidine    Tablet 20 milliGRAM(s) Oral daily  insulin lispro (ADMELOG) corrective regimen sliding scale   SubCutaneous every 4 hours  levETIRAcetam   Injectable 750 milliGRAM(s) IV Push every 12 hours  multiple electrolytes Injection Type 1 1000 milliLiter(s) (40 mL/Hr) IV Continuous <Continuous>  niCARdipine Infusion 5 mG/Hr (25 mL/Hr) IV Continuous <Continuous>  polyethylene glycol 3350 17 Gram(s) Oral daily  propofol Infusion 10 MICROgram(s)/kG/Min (3.6 mL/Hr) IV Continuous <Continuous>  senna 2 Tablet(s) Oral at bedtime    MEDICATIONS  (PRN):  fentaNYL    Injectable 25 MICROGram(s) IV Push every 4 hours PRN CPOT 4-6

## 2024-10-09 NOTE — PROGRESS NOTE ADULT - SUBJECTIVE AND OBJECTIVE BOX
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 Per pt's daughter, pt was LKW yesterday evening. Daughter states this morning pt was not answering the phone and did not open the door for her home health aide. Daughter came to home and found her on the floor, pt was conscious and told daughter "I fell". On initial arrival to ED pt only EO to pain, not following commands, WD extremities. Underwent CTH showing R hemispheric SDH 2.2cm w/ mass effect and 1.1cm MLS. On reassessment in ED pt opening eyes to voice, answering some questions and following simple commands. SBP after CT in 200s, started on Nicardipine gtt. Received 1g IV Keppra, 1850mL LR bolus, and was started on insulin gtt for BP in 400s. Pt assessed by NSGY, going to OR for evacuation. Admitted to NSICU for further management.      o/n- s/p craniotomy and hematoma evacuation with improving MLS c/b by postop mild R SD recollection    VITALS: [x] Reviewed    IMAGING/DATA: [x] Reviewed    IV FLUIDS/MEDICATIONS: [x] Reviewed    ALLERGIES: Allergies    No Known Allergies    Intolerances        EXAMINATION:  General: WN/WD, no acute distress  HENT: NC/AT, moist oral mucosa, orally intubated  Eyes: Anicteric sclerae  Cardiac: [id number]  Lungs: Clear  Abdomen: Soft, non-tender, +BS  Extremities: No c/c/e  Skin/Incision Site: Clean, dry and intact  Neurologic: precedex/prop gtt on board, partially sedated, arousable to tactile, midline gaze not tracking, no command following, perrl, symmetric face, spont on all four and purposeful      ICU Vital Signs Last 24 Hrs  T(C): 35.7 (09 Oct 2024 09:30), Max: 36.7 (08 Oct 2024 13:46)  T(F): 96.2 (09 Oct 2024 09:30), Max: 98 (08 Oct 2024 13:46)  HR: 59 (09 Oct 2024 12:00) (56 - 120)  BP: 114/37 (09 Oct 2024 11:29) (114/37 - 200/85)  BP(mean): 87 (09 Oct 2024 00:00) (81 - 98)  ABP: 112/44 (09 Oct 2024 12:00) (109/38 - 159/58)  ABP(mean): 69 (09 Oct 2024 12:00) (63 - 96)  RR: 16 (09 Oct 2024 12:00) (5 - 28)  SpO2: 100% (09 Oct 2024 12:00) (98% - 100%)      10-08-24 @ 07:01  -  10-09-24 @ 07:00  --------------------------------------------------------  IN: 1695.3 mL / OUT: 995 mL / NET: 700.3 mL    10-09-24 @ 07:01  -  10-09-24 @ 12:10  --------------------------------------------------------  IN: 264.1 mL / OUT: 145 mL / NET: 119.1 mL        Mode: AC/ CMV (Assist Control/ Continuous Mandatory Ventilation), RR (machine): 12, TV (machine): 450, FiO2: 40, PEEP: 5, PS: 5, ITime: 1, MAP: 8, PIP: 17    ceFAZolin   IVPB 2000 milliGRAM(s) IV Intermittent every 8 hours  chlorhexidine 0.12% Liquid 15 milliLiter(s) Oral Mucosa every 12 hours  chlorhexidine 0.12% Liquid 15 milliLiter(s) Oral Mucosa every 12 hours  chlorhexidine 2% Cloths 1 Application(s) Topical <User Schedule>  dexMEDEtomidine Infusion 0.2 MICROgram(s)/kG/Hr (3 mL/Hr) IV Continuous <Continuous>  dextrose 50% Injectable 50 milliLiter(s) IV Push every 15 minutes  dextrose 50% Injectable 25 milliLiter(s) IV Push every 15 minutes  dextrose 50% Injectable 25 milliLiter(s) IV Push every 15 minutes  famotidine    Tablet 20 milliGRAM(s) Oral daily  fentaNYL    Injectable 25 MICROGram(s) IV Push every 4 hours PRN  insulin lispro (ADMELOG) corrective regimen sliding scale   SubCutaneous every 4 hours  levETIRAcetam  IVPB 750 milliGRAM(s) IV Intermittent every 12 hours  multiple electrolytes Injection Type 1 1000 milliLiter(s) (40 mL/Hr) IV Continuous <Continuous>  niCARdipine Infusion 5 mG/Hr (25 mL/Hr) IV Continuous <Continuous>  propofol Infusion 10 MICROgram(s)/kG/Min (3.6 mL/Hr) IV Continuous <Continuous>      LABS:  Na: 142 (10-09 @ 08:20), 142 (10-09 @ 02:40), 144 (10-08 @ 19:48), 137 (10-08 @ 11:20)  K: 4.9 (10-09 @ 08:20), 5.4 (10-09 @ 02:40), 5.4 (10-08 @ 19:48), 5.8 (10-08 @ 11:20)  Cl: 113 (10-09 @ 08:20), 111 (10-09 @ 02:40), 114 (10-08 @ 19:48), 101 (10-08 @ 11:20)  CO2: 17 (10-09 @ 08:20), 17 (10-09 @ 02:40), 17 (10-08 @ 19:48), 19 (10-08 @ 11:20)  BUN: 40 (10-09 @ 08:20), 38 (10-09 @ 02:40), 34 (10-08 @ 19:48), 42 (10-08 @ 11:20)  Cr: 1.5 (10-09 @ 08:20), 1.3 (10-09 @ 02:40), 1.1 (10-08 @ 19:48), 1.3 (10-08 @ 11:20)  Glu: 219(10-09 @ 08:20), 260(10-09 @ 02:40), 155(10-08 @ 19:48), 465(10-08 @ 11:20)    Hgb: 10.8 (10-09 @ 02:40), 10.3 (10-08 @ 19:48), 6.8 (10-08 @ 11:20)  Hct: 30.8 (10-09 @ 02:40), 30.8 (10-08 @ 19:48), 21.8 (10-08 @ 11:20)  WBC: 12.18 (10-09 @ 02:40), 16.21 (10-08 @ 19:48), 24.05 (10-08 @ 11:20)  Plt: 343 (10-09 @ 02:40), 293 (10-08 @ 19:48), 672 (10-08 @ 11:20)    INR: 1.19 10-08-24 @ 19:50, 1.36 10-08-24 @ 14:50  PTT: 30.9 10-08-24 @ 19:50, 29.6 10-08-24 @ 14:50          LIVER FUNCTIONS - ( 09 Oct 2024 02:40 )  Alb: 4.1 g/dL / Pro: 5.4 g/dL / ALK PHOS: 102 U/L / ALT: 30 U/L / AST: 23 U/L / GGT: x           ABG - ( 09 Oct 2024 11:22 )  pH, Arterial: 7.37  pH, Blood: x     /  pCO2: 31    /  pO2: 206   / HCO3: 18    / Base Excess: -6.5  /  SaO2: 100.0                  SUMMARY: 79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 Per pt's daughter, pt was LKW yesterday evening. Daughter states this morning pt was not answering the phone and did not open the door for her home health aide. Daughter came to home and found her on the floor, pt was conscious and told daughter "I fell". On initial arrival to ED pt only EO to pain, not following commands, WD extremities. Underwent CTH showing R hemispheric SDH 2.2cm w/ mass effect and 1.1cm MLS. On reassessment in ED pt opening eyes to voice, answering some questions and following simple commands. SBP after CT in 200s, started on Nicardipine gtt. Received 1g IV Keppra, 1850mL LR bolus, and was started on insulin gtt for BP in 400s. Pt assessed by NSGY, going to OR for evacuation. Admitted to NSICU for further management.      GCS 15 upon arrival as per trauma note     o/n- s/p craniotomy and hematoma evacuation with improving MLS c/b by postop mild R SD recollection    VITALS: [x] Reviewed    IMAGING/DATA: [x] Reviewed    IV FLUIDS/MEDICATIONS: [x] Reviewed    ALLERGIES: Allergies    No Known Allergies    Intolerances        EXAMINATION:  General: WN/WD, no acute distress  HENT: NC/AT, moist oral mucosa, orally intubated  Eyes: Anicteric sclerae  Cardiac: [id number]  Lungs: Clear  Abdomen: Soft, non-tender, +BS  Extremities: No c/c/e  Skin/Incision Site: Clean, dry and intact  Neurologic: precedex/prop gtt on board, partially sedated, arousable to tactile, midline gaze not tracking, no command following, perrl, symmetric face, spont on all four and purposeful      ICU Vital Signs Last 24 Hrs  T(C): 35.7 (09 Oct 2024 09:30), Max: 36.7 (08 Oct 2024 13:46)  T(F): 96.2 (09 Oct 2024 09:30), Max: 98 (08 Oct 2024 13:46)  HR: 59 (09 Oct 2024 12:00) (56 - 120)  BP: 114/37 (09 Oct 2024 11:29) (114/37 - 200/85)  BP(mean): 87 (09 Oct 2024 00:00) (81 - 98)  ABP: 112/44 (09 Oct 2024 12:00) (109/38 - 159/58)  ABP(mean): 69 (09 Oct 2024 12:00) (63 - 96)  RR: 16 (09 Oct 2024 12:00) (5 - 28)  SpO2: 100% (09 Oct 2024 12:00) (98% - 100%)      10-08-24 @ 07:01  -  10-09-24 @ 07:00  --------------------------------------------------------  IN: 1695.3 mL / OUT: 995 mL / NET: 700.3 mL    10-09-24 @ 07:01  -  10-09-24 @ 12:10  --------------------------------------------------------  IN: 264.1 mL / OUT: 145 mL / NET: 119.1 mL        Mode: AC/ CMV (Assist Control/ Continuous Mandatory Ventilation), RR (machine): 12, TV (machine): 450, FiO2: 40, PEEP: 5, PS: 5, ITime: 1, MAP: 8, PIP: 17    ceFAZolin   IVPB 2000 milliGRAM(s) IV Intermittent every 8 hours  chlorhexidine 0.12% Liquid 15 milliLiter(s) Oral Mucosa every 12 hours  chlorhexidine 0.12% Liquid 15 milliLiter(s) Oral Mucosa every 12 hours  chlorhexidine 2% Cloths 1 Application(s) Topical <User Schedule>  dexMEDEtomidine Infusion 0.2 MICROgram(s)/kG/Hr (3 mL/Hr) IV Continuous <Continuous>  dextrose 50% Injectable 50 milliLiter(s) IV Push every 15 minutes  dextrose 50% Injectable 25 milliLiter(s) IV Push every 15 minutes  dextrose 50% Injectable 25 milliLiter(s) IV Push every 15 minutes  famotidine    Tablet 20 milliGRAM(s) Oral daily  fentaNYL    Injectable 25 MICROGram(s) IV Push every 4 hours PRN  insulin lispro (ADMELOG) corrective regimen sliding scale   SubCutaneous every 4 hours  levETIRAcetam  IVPB 750 milliGRAM(s) IV Intermittent every 12 hours  multiple electrolytes Injection Type 1 1000 milliLiter(s) (40 mL/Hr) IV Continuous <Continuous>  niCARdipine Infusion 5 mG/Hr (25 mL/Hr) IV Continuous <Continuous>  propofol Infusion 10 MICROgram(s)/kG/Min (3.6 mL/Hr) IV Continuous <Continuous>      LABS:  Na: 142 (10-09 @ 08:20), 142 (10-09 @ 02:40), 144 (10-08 @ 19:48), 137 (10-08 @ 11:20)  K: 4.9 (10-09 @ 08:20), 5.4 (10-09 @ 02:40), 5.4 (10-08 @ 19:48), 5.8 (10-08 @ 11:20)  Cl: 113 (10-09 @ 08:20), 111 (10-09 @ 02:40), 114 (10-08 @ 19:48), 101 (10-08 @ 11:20)  CO2: 17 (10-09 @ 08:20), 17 (10-09 @ 02:40), 17 (10-08 @ 19:48), 19 (10-08 @ 11:20)  BUN: 40 (10-09 @ 08:20), 38 (10-09 @ 02:40), 34 (10-08 @ 19:48), 42 (10-08 @ 11:20)  Cr: 1.5 (10-09 @ 08:20), 1.3 (10-09 @ 02:40), 1.1 (10-08 @ 19:48), 1.3 (10-08 @ 11:20)  Glu: 219(10-09 @ 08:20), 260(10-09 @ 02:40), 155(10-08 @ 19:48), 465(10-08 @ 11:20)    Hgb: 10.8 (10-09 @ 02:40), 10.3 (10-08 @ 19:48), 6.8 (10-08 @ 11:20)  Hct: 30.8 (10-09 @ 02:40), 30.8 (10-08 @ 19:48), 21.8 (10-08 @ 11:20)  WBC: 12.18 (10-09 @ 02:40), 16.21 (10-08 @ 19:48), 24.05 (10-08 @ 11:20)  Plt: 343 (10-09 @ 02:40), 293 (10-08 @ 19:48), 672 (10-08 @ 11:20)    INR: 1.19 10-08-24 @ 19:50, 1.36 10-08-24 @ 14:50  PTT: 30.9 10-08-24 @ 19:50, 29.6 10-08-24 @ 14:50          LIVER FUNCTIONS - ( 09 Oct 2024 02:40 )  Alb: 4.1 g/dL / Pro: 5.4 g/dL / ALK PHOS: 102 U/L / ALT: 30 U/L / AST: 23 U/L / GGT: x           ABG - ( 09 Oct 2024 11:22 )  pH, Arterial: 7.37  pH, Blood: x     /  pCO2: 31    /  pO2: 206   / HCO3: 18    / Base Excess: -6.5  /  SaO2: 100.0

## 2024-10-09 NOTE — CONSULT NOTE ADULT - SUBJECTIVE AND OBJECTIVE BOX
Neuroendovascular Consult note:   Consulted for: SDH for MMA embolization     HPI:  Patient is a 79 year old female history of MDS, DM, HTN, CKD presented unresponsive found on CTH with large right SDH with MLS, now s/p crani evacuation with the neurosurgery team. Now with post-op CTH showing reaccumulation of the SDH s/p crani evacuation. Neuroendovascular team consulted for evaluation for possible MMA embolization. Patient remains intubated on sedation in neuro ICU.     Past medical history:   DM (diabetes mellitus)    MDS (myelodysplastic syndrome)        Medications:  ceFAZolin   IVPB 2000 milliGRAM(s) IV Intermittent every 8 hours  chlorhexidine 0.12% Liquid 15 milliLiter(s) Oral Mucosa every 12 hours  chlorhexidine 0.12% Liquid 15 milliLiter(s) Oral Mucosa every 12 hours  chlorhexidine 2% Cloths 1 Application(s) Topical <User Schedule>  dexMEDEtomidine Infusion 0.2 MICROgram(s)/kG/Hr IV Continuous <Continuous>  dextrose 50% Injectable 25 milliLiter(s) IV Push every 15 minutes  dextrose 50% Injectable 50 milliLiter(s) IV Push every 15 minutes  dextrose 50% Injectable 25 milliLiter(s) IV Push every 15 minutes  insulin lispro (ADMELOG) corrective regimen sliding scale   SubCutaneous every 4 hours  levETIRAcetam  IVPB 1000 milliGRAM(s) IV Intermittent every 12 hours  niCARdipine Infusion 5 mG/Hr IV Continuous <Continuous>  pantoprazole  Injectable 40 milliGRAM(s) IV Push daily  propofol Infusion 10 MICROgram(s)/kG/Min IV Continuous <Continuous>      Vitals:   T(F): 96.2 (10-09-24 @ 09:30), Max: 98 (10-08-24 @ 13:46)  HR: 61 (10-09-24 @ 10:15) (58 - 121)  BP: 131/61 (10-09-24 @ 00:00) (128/62 - 200/85)  RR: 16 (10-09-24 @ 10:15) (5 - 28)  SpO2: 100% (10-09-24 @ 10:15) (96% - 100%)    Labs:                         10.8   12.18 )-----------( 343      ( 09 Oct 2024 02:40 )             30.8     10-09    142  |  113[H]  |  40[H]  ----------------------------<  219[H]  4.9   |  17  |  1.5    Ca    9.0      09 Oct 2024 08:20  Phos  4.8     10-09  Mg     2.6     10-09    TPro  5.4[L]  /  Alb  4.1  /  TBili  1.2  /  DBili  x   /  AST  23  /  ALT  30  /  AlkPhos  102  10-09    PT/INR - ( 08 Oct 2024 19:50 )   PT: 13.60 sec;   INR: 1.19 ratio       PTT - ( 08 Oct 2024 19:50 )  PTT:30.9 sec  LIVER FUNCTIONS - ( 09 Oct 2024 02:40 )  Alb: 4.1 g/dL / Pro: 5.4 g/dL / ALK PHOS: 102 U/L / ALT: 30 U/L / AST: 23 U/L / GGT: x             Exam:   General - NAD   Neuro - intubated and sedated, gaze midline, PERRLA, face symmetric, spontaneous EO, no command following, moving all extremities     Radiology:   < from: CT Head No Cont (10.09.24 @ 05:22) >  IMPRESSION:  Redemonstration of right hemispheric craniotomy for subdural hematoma   evacuation with overall increased degree of residual hemorrhage as well   as slightly increased leftward midline shift.    Stable thin left hemispheric subdural hematoma.    < end of copied text >      Assessment:   79 year old female s/p right crani evacuation of SDH now with increased SDH collection s/p evacuation. Neuroendovascular team consulted for evaluation for possible MMA embolization.     Suggestions:  MMA embolization today pending family consent and patient stability   Continue NPO status for pending procedure today   Family discussion prior to procedure for consent   Discussed with neuro ICU and Dr Shepard   [phone]

## 2024-10-09 NOTE — CHART NOTE - NSCHARTNOTEFT_GEN_A_CORE
PACU ANESTHESIA ADMISSION NOTE      Procedure: Craniotomy, emergent, for subdural hematoma evacuation    __x__  Patent Airway    __x__  Full return of protective reflexes    __x__  Full recovery from anesthesia / back to baseline status    Vitals:  T(C): 36.5 (10-09-24 @ 17:30), Max: 36.7 (10-09-24 @ 15:55)  HR: 76 (10-09-24 @ 17:30) (56 - 92)  BP: 114/37 (10-09-24 @ 17:30) (114/37 - 156/68)  RR: 16 (10-09-24 @ 17:30) (5 - 28)  SpO2: 100% (10-09-24 @ 17:30) (100% - 100%)    Mental Status:  __x__ Awake   ___x__ Alert   _____ Drowsy   _____ Sedated    Nausea/Vomiting:  __x__ NO  ______Yes,   See Post - Op Orders          Pain Scale (0-10):  _0____    Treatment: ____ None    __x__ See Post - Op/PCA Orders    Post - Operative Fluids:   ____ Oral   __x__ See Post - Op Orders    Plan: Discharge:   ____Home       _____Floor     _____Critical Care    ___x__  Other:_________________    Comments: Patient had no intraoperative events and no anesthesia complications.    Patient remains intubate and on vent (400 / 12 / 35 / 5)    On no pressors

## 2024-10-09 NOTE — BRIEF OPERATIVE NOTE - NSICDXBRIEFPROCEDURE_GEN_ALL_CORE_FT
PROCEDURES:  Embolization of cerebral vessel 09-Oct-2024 18:07:38  Judit Muñiz  
PROCEDURES:  Craniotomy, emergent, for subdural hematoma evacuation 08-Oct-2024 18:05:40  Harrison Cornell

## 2024-10-09 NOTE — BRIEF OPERATIVE NOTE - OPERATION/FINDINGS
Procedure : Diagnostic cerebral angiogram + right middle meningeal artery embolization     Contrast: Visipaque 320   IA medications: Heparin 3000 IU, Verapamil 2.5mg, Nitroglycerin 200 mcg   Implants placed: Embosphere 100-300 microparticles inj to right MMA   Complications : n/a    Preliminary Report:  Selective diagnostic angiogram performed from right radial arteriotomy site with successful middle meningeal artery embolization performed with 100-300 embosphere microparticles injected.     Official IR neuro procedure note to follow.      Procedure : Diagnostic cerebral angiogram + right middle meningeal artery embolization     Contrast: Visipaque 320   IA medications: Heparin 3000 IU, Verapamil 2.5mg, Nitroglycerin 200 mcg   Implants placed: Embosphere 100-300 microparticles inj to right MMA   Complications : n/a    Preliminary Report:  Selective diagnostic angiogram performed from right radial arteriotomy site with successful middle meningeal artery embolization performed with 100-300 embosphere microparticles injected.   Of note, no robust filling of MMA was noted intraop when evaluating the left ECA/MMA. 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 Formal report pending.     Official IR neuro procedure note to follow.

## 2024-10-09 NOTE — BRIEF OPERATIVE NOTE - COMMENTS
Please follow post NI orders for neuro checks, distal pulses, vitals, and arteriotomy site checks placed for total of 4 hour recovery period following procedure.   Keep HOB elevated, right wrist straight and immobile for x 4 hr       Patient tolerated procedure well, hemodynamically stable, no change in neurological status compared to baseline.   Right wrist site CDI without evidence of bleeding hematoma oozing ecchymosis swelling at the time of closure. Site nontender to palpation. Temperature and color consistent bilaterally to palpation with intact distal pulses. TR band to 11 cc post op.      Please notify provider with any signs of bleeding or hematoma at arteriotomy site, change in mental status, vitals outside parameters, or absent distal pulses.   Management per neuro ICU. Signout given at bedside and over spectra.   [phone]

## 2024-10-09 NOTE — DIETITIAN INITIAL EVALUATION ADULT - LAB (SPECIFY)
10/9: H/H 10.8 / 30.8 (L), K+ 5.4 (H), Chloride 111 (H), BUN 38 (H), Glucose 260 (H), eGFR 42 (L), Mg 2.6 (H), A1c 6.0 (H)

## 2024-10-09 NOTE — PROGRESS NOTE ADULT - SUBJECTIVE AND OBJECTIVE BOX
Subjective: 79yFemale with a pmhx of Traumatic subdural hemorrhage without loss of consciousness, initial encounter    NOMD    NOMD    NOMD    NOMD    NOMD    Abnormal finding of blood chemistry, unspecified    Acute kidney failure, unspecified    Anemia, unspecified    Chronic cough    Chronic kidney disease, unspecified    Cough, unspecified    Cutaneous abscess of back [any part, except buttock]    Cutaneous abscess of limb, unspecified    Deficiency of other specified B group vitamins    Diarrhea, unspecified    Encounter for antineoplastic chemotherapy    Essential (primary) hypertension    FALL (ON) (FROM) UNSPECIFIED STAIRS AND STEPS, INIT ENCNTR    Fever, unspecified    History of falling    Hypomagnesemia    Long term (current) use of oral hypoglycemic drugs    Myelodysplastic syndrome, unspecified    Other drug-induced agranulocytosis    Other long term (current) drug therapy    Other specified disorders of nose and nasal sinuses    Pain in right knee    Pain in unspecified knee    Personal history of diseases of the blood and blood-forming organs and certain disorders involving the immune mechanism    Personal history of other endocrine, nutritional and metabolic disease    Personal history of other medical treatment    Personal history of other specified conditions    Pneumonia, unspecified organism    Shortness of breath    Synovial cyst of popliteal space [Rios], right knee    Thrombocytopenia, unspecified    Thrombocytosis, unspecified    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 disease    Unspecified abdominal pain    Unspecified fall, initial encounter    Unspecified place or not applicable    Chronic kidney disease, stage 3b    Disorder of kidney and ureter, unspecified    Irritable bowel syndrome with diarrhea    No pertinent family history in first degree relatives    No pertinent family history in first degree relatives    No pertinent family history in first degree relatives    Handoff    MEWS Score    DM (diabetes mellitus)    MDS (myelodysplastic syndrome)    Traumatic subdural hemorrhage with brief coma    Craniotomy, emergent, for subdural hematoma evacuation    No significant past surgical history    H/O colonoscopy    FALL HIT HEAD    90+    [id number]        POD#1 s/p Right craniotomy for evac of SDH    Pt seen and examined at bedside in NICU. Pt remains intubated, sedated on Precedex.  BP control with Cardene. Pt opened eyes to voice, tries to follow commands.    Allergies    No Known Allergies    Intolerances        Imaging:  < from: CT Head No Cont (10.09.24 @ 05:22) >  IMPRESSION:  Redemonstration of right hemispheric craniotomy for subdural hematoma   evacuation with overall increased degree of residual hemorrhage as well   as slightly increased leftward midline shift.    Stable thin left hemispheric subdural hematoma.    < end of copied text >      Vital Signs Last 24 Hrs  T(C): 35.7 (09 Oct 2024 09:30), Max: 36.7 (08 Oct 2024 13:46)  T(F): 96.2 (09 Oct 2024 09:30), Max: 98 (08 Oct 2024 13:46)  HR: 56 (09 Oct 2024 11:30) (56 - 120)  BP: 131/61 (09 Oct 2024 00:00) (128/62 - 200/85)  BP(mean): 87 (09 Oct 2024 00:00) (81 - 98)  RR: 16 (09 Oct 2024 11:30) (5 - 28)  SpO2: 100% (09 Oct 2024 11:30) (98% - 100%)      ceFAZolin   IVPB 2000 milliGRAM(s) IV Intermittent every 8 hours  chlorhexidine 0.12% Liquid 15 milliLiter(s) Oral Mucosa every 12 hours  chlorhexidine 0.12% Liquid 15 milliLiter(s) Oral Mucosa every 12 hours  chlorhexidine 2% Cloths 1 Application(s) Topical <User Schedule>  dexMEDEtomidine Infusion 0.2 MICROgram(s)/kG/Hr IV Continuous <Continuous>  dextrose 50% Injectable 50 milliLiter(s) IV Push every 15 minutes  dextrose 50% Injectable 25 milliLiter(s) IV Push every 15 minutes  dextrose 50% Injectable 25 milliLiter(s) IV Push every 15 minutes  fentaNYL    Injectable 25 MICROGram(s) IV Push every 4 hours PRN  insulin lispro (ADMELOG) corrective regimen sliding scale   SubCutaneous every 4 hours  levETIRAcetam  IVPB 1000 milliGRAM(s) IV Intermittent every 12 hours  niCARdipine Infusion 5 mG/Hr IV Continuous <Continuous>  pantoprazole  Injectable 40 milliGRAM(s) IV Push daily  propofol Infusion 10 MICROgram(s)/kG/Min IV Continuous <Continuous>        10-08-24 @ 07:01  -  10-09-24 @ 07:00  --------------------------------------------------------  IN: 1695.3 mL / OUT: 995 mL / NET: 700.3 mL    10-09-24 @ 07:01  -  10-09-24 @ 11:32  --------------------------------------------------------  IN: 264.1 mL / OUT: 145 mL / NET: 119.1 mL        REVIEW OF SYSTEMS    [ ] A ten-point review of systems was otherwise negative except as noted.  [ x] Due to altered mental status/intubation, subjective information were not able to be obtained from the patient. History was obtained, to the extent possible, from review of the chart and collateral sources of information.      Neuro Exam:  Intubated, sedated on precedex  opens eyes to loud voice  not tracking  PERRL  Motor: weakly  hands to command R>L  w/d b/l LE to pain briskly      Wound: intact, no staining. NOBLE output 25/415cc bloody        CBC Full  -  ( 09 Oct 2024 02:40 )  WBC Count : 12.18 K/uL  RBC Count : 3.54 M/uL  Hemoglobin : 10.8 g/dL  Hematocrit : 30.8 %  Platelet Count - Automated : 343 K/uL  Mean Cell Volume : 87.0 fL  Mean Cell Hemoglobin : 30.5 pg  Mean Cell Hemoglobin Concentration : 35.1 g/dL  Auto Neutrophil # : 10.36 K/uL  Auto Lymphocyte # : 0.58 K/uL  Auto Monocyte # : 1.03 K/uL  Auto Eosinophil # : 0.00 K/uL  Auto Basophil # : 0.05 K/uL  Auto Neutrophil % : 85.0 %  Auto Lymphocyte % : 4.8 %  Auto Monocyte % : 8.5 %  Auto Eosinophil % : 0.0 %  Auto Basophil % : 0.4 %    10-09    142  |  113[H]  |  40[H]  ----------------------------<  219[H]  4.9   |  17  |  1.5    Ca    9.0      09 Oct 2024 08:20  Phos  4.8     10-09  Mg     2.6     10-09    TPro  5.4[L]  /  Alb  4.1  /  TBili  1.2  /  DBili  x   /  AST  23  /  ALT  30  /  AlkPhos  102  10-09    PT/INR - ( 08 Oct 2024 19:50 )   PT: 13.60 sec;   INR: 1.19 ratio         PTT - ( 08 Oct 2024 19:50 )  PTT:30.9 sec    I&O's Detail    08 Oct 2024 07:01  -  09 Oct 2024 07:00  --------------------------------------------------------  IN:    Dexmedetomidine: 20.8 mL    Insulin: 4.5 mL    IV PiggyBack: 400 mL    NiCARdipine: 450 mL    NiCARdipine: 375 mL    Propofol: 245 mL    sodium chloride 0.9%: 200 mL  Total IN: 1695.3 mL    OUT:    Bulb (mL): 415 mL    Indwelling Catheter - Urethral (mL): 580 mL  Total OUT: 995 mL    Total NET: 700.3 mL      09 Oct 2024 07:01  -  09 Oct 2024 11:32  --------------------------------------------------------  IN:    Dexmedetomidine: 51.6 mL    NiCARdipine: 212.5 mL  Total IN: 264.1 mL    OUT:    Indwelling Catheter - Urethral (mL): 145 mL    Propofol: 0 mL  Total OUT: 145 mL    Total NET: 119.1 mL        I&O's Summary    08 Oct 2024 07:01  -  09 Oct 2024 07:00  --------------------------------------------------------  IN: 1695.3 mL / OUT: 995 mL / NET: 700.3 mL    09 Oct 2024 07:01  -  09 Oct 2024 11:32  --------------------------------------------------------  IN: 264.1 mL / OUT: 145 mL / NET: 119.1 mL          Assessment/Plan:   Cont neuro checks q 1hr  Wean to extubate  Cont to monitor and record NOBLE output  Strict SBP<130   No chemical DVT prophylaxis yet  Care per NCC  d/w Attg

## 2024-10-09 NOTE — DIETITIAN INITIAL EVALUATION ADULT - OTHER CALCULATIONS
Energy: 1286 - 1608 kcal/day (20 - 25 kcal/kg) vs 1080.22 kcal/day (PSE Tmax 36.2, Ve 5.9)  +203.28 kcal from propofol (7.7 mL/hr)  Protein: 77 - 90 gm/day (1.2 - 1.4 gm/kg)  Fluid: 1 mL/kcal   with consideration for age, BMI, critical care setting

## 2024-10-09 NOTE — DIETITIAN INITIAL EVALUATION ADULT - OTHER INFO
Patient is a 79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 Per pt's daughter, pt was LKW yesterday evening. Daughter states this morning pt was not answering the phone and did not open the door for her home health aide. Daughter came to home and found her on the floor, pt was conscious and told daughter "I fell".    Found to have R SDH. s/p evacuation on 10/8  intubated. ?CKD. NPO. Goal  - 180  last infusion of propofol 7.7 mL/hr - currently 0 mL noted, but propofol still ordered    weight hx per EMR:  63 kg (12/14/23)  85.3 kg (1/4/24)  63 kg (2/1/24)  61.2 kg (5/23/24)  vs dosing weight 64.3 kg

## 2024-10-09 NOTE — PRE PROCEDURE NOTE - PRE PROCEDURE EVALUATION
HPI:  Patient is a 79 year old female, history of MDS, DM, HTN, CKD presented unresponsive found with large right SDH with MLS - now s/p crani evacuation with the neurosurgery team, CTH with reaccumulation of the SDH. Now for MMA embolization. Consent obtained from patient's daughter over the phone.     NPO except meds since midnight.     Allergies: No Known Allergies      PAST MEDICAL & SURGICAL HISTORY:  DM (diabetes mellitus)      MDS (myelodysplastic syndrome)      H/O colonoscopy          Current Medications: chlorhexidine 0.12% Liquid 15 milliLiter(s) Oral Mucosa every 12 hours  chlorhexidine 2% Cloths 1 Application(s) Topical <User Schedule>  dexMEDEtomidine Infusion 0.2 MICROgram(s)/kG/Hr IV Continuous <Continuous>  dextrose 50% Injectable 25 milliLiter(s) IV Push every 15 minutes  dextrose 50% Injectable 50 milliLiter(s) IV Push every 15 minutes  dextrose 50% Injectable 25 milliLiter(s) IV Push every 15 minutes  famotidine    Tablet 20 milliGRAM(s) Oral daily  fentaNYL    Injectable 25 MICROGram(s) IV Push every 4 hours PRN  insulin lispro (ADMELOG) corrective regimen sliding scale   SubCutaneous every 4 hours  levETIRAcetam   Injectable 750 milliGRAM(s) IV Push every 12 hours  multiple electrolytes Injection Type 1 1000 milliLiter(s) IV Continuous <Continuous>  niCARdipine Infusion 5 mG/Hr IV Continuous <Continuous>  polyethylene glycol 3350 17 Gram(s) Oral daily  propofol Infusion 10 MICROgram(s)/kG/Min IV Continuous <Continuous>  senna 2 Tablet(s) Oral at bedtime      Labs:                         10.8   12.18 )-----------( 343      ( 09 Oct 2024 02:40 )             30.8     10-09    142  |  113[H]  |  40[H]  ----------------------------<  219[H]  4.9   |  17  |  1.5    Ca    9.0      09 Oct 2024 08:20  Phos  4.8     10-09  Mg     2.6     10-09    TPro  5.4[L]  /  Alb  4.1  /  TBili  1.2  /  DBili  x   /  AST  23  /  ALT  30  /  AlkPhos  102  10-09      Blood Bank: 10-08-24  --  A POS  --      Assessment/Plan:   This is a 79y  year old female presents with SDH s/p crani evacuation   Patient presents to neuro-IR for diagnostic cerebral angiogram and planned mma embolization .   Procedure, goals, risks, benefits and alternatives  were discussed with patient and patient's family.  All questions were answered to best understanding.   Risks discussed include but are not limited to stroke, vessel injury, hemorrhage, and or arteriotomy site hematoma.   Patient / proxy demonstrates understanding  of all risks involved with this procedure and wishes to continue.     Appropriate consent was obtained and placed in the patient's chart.

## 2024-10-09 NOTE — DIETITIAN INITIAL EVALUATION ADULT - PERTINENT LABORATORY DATA
10-09    142  |  113[H]  |  40[H]  ----------------------------<  219[H]  4.9   |  17  |  1.5    Ca    9.0      09 Oct 2024 08:20  Phos  4.8     10-09  Mg     2.6     10-09    TPro  5.4[L]  /  Alb  4.1  /  TBili  1.2  /  DBili  x   /  AST  23  /  ALT  30  /  AlkPhos  102  10-09  POCT Blood Glucose.: 116 mg/dL (10-09-24 @ 15:13)  A1C with Estimated Average Glucose Result: 6.0 % (10-09-24 @ 02:40)  A1C with Estimated Average Glucose Result: 7.2 % (05-24-24 @ 07:40)  A1C with Estimated Average Glucose Result: 6.9 % (12-15-23 @ 06:53)

## 2024-10-09 NOTE — PROGRESS NOTE ADULT - ASSESSMENT
ASSESSMENT/PLAN:  79F w/ PMHx of MDS, DM, HTN, CKD presents after fall at home, found to have R SDH. Going to OR for evacuation    NEURO:  q1h neurochecks  s/p crani and hematoma evacuation on 10/8 with drain placement, monitor output  repeat cth today  MMA embo  NSGY following  analgo-sedation- precedex/fentanyl prn   Activity: [] OOB as tolerated [] Bedrest [] PT [] OT [] PMNR    PULM:  lung protective vent support   PSV trial as tolerates  Pt remaining intubated for interval stability CTH (given worsening subdural hematoma after initial improvement, planmned procedure today ,and no command following)  HOB>30  Incentive spirometry  Aspiration precautions    CV:  -130  Nicardipine gtt  Takes Lisinopril (held for hyperK), Nifedipine at home  echo     RENAL: ?CKD  gentle IVF  Monitor I&Os, ocampo     GI:  NPO  GI prophylaxis [] not indicated [] PPI [x] pepcid   Bowel regimen [x] miralax [x] senna [] other:    ENDO:   Goal -180  Elevated BG/AG on arrival  Normal B-hydroxybutyrate  Started on insulin gtt in ED, repeat FS and transitioned to SQ  Hold glimepiride, jardiance, pioglitazone  a1c  TSH     HEME/ONC:  VTE prophylaxis: [x] SCDs [] chemoprophylaxis [x] hold chemoprophylaxis due to: SDH  venous dopplers b/l LEs  MDS hx (follows with Dr Wade)- s/p 4:1:1 of red/ffp/plt intraoperatively  inr normal on 10/8 in pm    ID:  Symone-op antibiotics  UA negative  F/u blood cx    MISC:    SOCIAL/FAMILY:  [] awaiting [x] updated at bedside [] family meeting    CODE STATUS:  [x] Full Code [] DNR [] DNI [] Palliative/Comfort Care    DISPOSITION:  [x] ICU [] Stroke Unit [] Floor [] CEU [] Tele

## 2024-10-09 NOTE — DIETITIAN INITIAL EVALUATION ADULT - ORAL INTAKE PTA/DIET HISTORY
Dave Angelucci  2008      Subjective:      History was provided by the parent/care giver  Dave Angelucci is a 16 y.o. male who is brought in by family  Immunization History   Administered Date(s) Administered    DTaP, DAPTACEL, (age 6w-6y), IM, 0.5mL 11/16/2010, 05/13/2014    DTaP-IPV/Hib, PENTACEL, (age 6w-4y), IM, 0.5mL 2009, 2009, 2009    Hepatitis B 2009, 2009, 2009    Hib (HbOC) 04/26/2010    Influenza Virus Vaccine 2009, 2009, 10/08/2010, 10/20/2011, 10/11/2012, 09/26/2013, 09/25/2014, 10/27/2015, 11/08/2016, 09/29/2017, 11/05/2018    Influenza, FLUARIX, FLULAVAL, FLUZONE (age 10 mo+) AND AFLURIA, (age 1 y+), PF, 0.5mL 10/16/2019, 10/12/2020    MMR, [facility], M-M-R II, (age 12m+), SC, 0.5mL 08/17/2010, 05/13/2014    Meningococcal ACWY, MENVEO (MenACWY-CRM), (age 1m-47y), IM, 0.5mL 06/07/2021    Pneumococcal Conjugate 7-valent (Rell Mcknight) 2009, 2009, 2009, 08/17/2010    Poliovirus, IPOL, (age 6w+), SC/IM, 0.5mL 05/13/2014    TDaP, ADACEL (age 10y-63y), BOOSTRIX (age 10y+), IM, 0.5mL 06/07/2021    Varicella, VARIVAX, (age 12m+), SC, 0.5mL 04/26/2010, 05/13/2014     No past medical history on file. There are no problems to display for this patient. No past surgical history on file. No current outpatient medications on file. No current facility-administered medications for this visit. No Known Allergies    Current Issues:  Current concerns : Doing well no acute concerns here for yearly exam and sports physical  Sleep apnea screening: Does patient snore? no     Review of Nutrition:  Current diet:routine for age    Social Screening:  Secondhand smoke exposure? no     Review of Systems   Constitutional:  Negative for activity change, appetite change, fever and unexpected weight change. HENT:  Negative for dental problem and sore throat. Respiratory:  Negative for shortness of breath, wheezing and stridor. Cardiovascular: Negative.
Patient noted to be intubated at time of RD visit. RD to obtain nutrition hx at follow up as able.

## 2024-10-09 NOTE — DIETITIAN INITIAL EVALUATION ADULT - COLLABORATION WITH OTHER PROVIDERS
Interventions: EN recs, coordination of care  Monitoring/Evaluation: energy intake, weight, labs, skin status, NFPF

## 2024-10-09 NOTE — PRE-ANESTHESIA EVALUATION ADULT - MALLAMPATI CLASS
Class III - visualization of the soft palate and the base of the uvula
Patient intubated, unable to assess

## 2024-10-10 ENCOUNTER — APPOINTMENT (OUTPATIENT)
Age: 79
End: 2024-10-10

## 2024-10-10 LAB
ALBUMIN SERPL ELPH-MCNC: 3.5 G/DL — SIGNIFICANT CHANGE UP (ref 3.5–5.2)
ALP SERPL-CCNC: 85 U/L — SIGNIFICANT CHANGE UP (ref 30–115)
ALT FLD-CCNC: 29 U/L — SIGNIFICANT CHANGE UP (ref 0–41)
ANION GAP SERPL CALC-SCNC: 12 MMOL/L — SIGNIFICANT CHANGE UP (ref 7–14)
AST SERPL-CCNC: 31 U/L — SIGNIFICANT CHANGE UP (ref 0–41)
BASE EXCESS BLDA CALC-SCNC: -8.9 MMOL/L — LOW (ref -2–3)
BASOPHILS # BLD AUTO: 0.05 K/UL — SIGNIFICANT CHANGE UP (ref 0–0.2)
BASOPHILS NFR BLD AUTO: 0.2 % — SIGNIFICANT CHANGE UP (ref 0–1)
BILIRUB SERPL-MCNC: 0.6 MG/DL — SIGNIFICANT CHANGE UP (ref 0.2–1.2)
BUN SERPL-MCNC: 43 MG/DL — HIGH (ref 10–20)
CALCIUM SERPL-MCNC: 8.5 MG/DL — SIGNIFICANT CHANGE UP (ref 8.4–10.5)
CHLORIDE SERPL-SCNC: 116 MMOL/L — HIGH (ref 98–110)
CO2 SERPL-SCNC: 17 MMOL/L — SIGNIFICANT CHANGE UP (ref 17–32)
CREAT SERPL-MCNC: 1.4 MG/DL — SIGNIFICANT CHANGE UP (ref 0.7–1.5)
EGFR: 39 ML/MIN/1.73M2 — LOW
EOSINOPHIL # BLD AUTO: 0 K/UL — SIGNIFICANT CHANGE UP (ref 0–0.7)
EOSINOPHIL NFR BLD AUTO: 0 % — SIGNIFICANT CHANGE UP (ref 0–8)
GAS PNL BLDA: SIGNIFICANT CHANGE UP
GLUCOSE BLDC GLUCOMTR-MCNC: 126 MG/DL — HIGH (ref 70–99)
GLUCOSE BLDC GLUCOMTR-MCNC: 136 MG/DL — HIGH (ref 70–99)
GLUCOSE BLDC GLUCOMTR-MCNC: 193 MG/DL — HIGH (ref 70–99)
GLUCOSE BLDC GLUCOMTR-MCNC: 220 MG/DL — HIGH (ref 70–99)
GLUCOSE BLDC GLUCOMTR-MCNC: 279 MG/DL — HIGH (ref 70–99)
GLUCOSE SERPL-MCNC: 106 MG/DL — HIGH (ref 70–99)
HCO3 BLDA-SCNC: 16 MMOL/L — LOW (ref 21–28)
HCT VFR BLD CALC: 24.2 % — LOW (ref 37–47)
HGB BLD-MCNC: 8.5 G/DL — LOW (ref 12–16)
HOROWITZ INDEX BLDA+IHG-RTO: 35 — SIGNIFICANT CHANGE UP
IMM GRANULOCYTES NFR BLD AUTO: 0.8 % — HIGH (ref 0.1–0.3)
LYMPHOCYTES # BLD AUTO: 0.65 K/UL — LOW (ref 1.2–3.4)
LYMPHOCYTES # BLD AUTO: 2.8 % — LOW (ref 20.5–51.1)
MAGNESIUM SERPL-MCNC: 2.2 MG/DL — SIGNIFICANT CHANGE UP (ref 1.8–2.4)
MCHC RBC-ENTMCNC: 30.4 PG — SIGNIFICANT CHANGE UP (ref 27–31)
MCHC RBC-ENTMCNC: 35.1 G/DL — SIGNIFICANT CHANGE UP (ref 32–37)
MCV RBC AUTO: 86.4 FL — SIGNIFICANT CHANGE UP (ref 81–99)
MONOCYTES # BLD AUTO: 1.6 K/UL — HIGH (ref 0.1–0.6)
MONOCYTES NFR BLD AUTO: 6.9 % — SIGNIFICANT CHANGE UP (ref 1.7–9.3)
NEUTROPHILS # BLD AUTO: 20.72 K/UL — HIGH (ref 1.4–6.5)
NEUTROPHILS NFR BLD AUTO: 89.3 % — HIGH (ref 42.2–75.2)
NRBC # BLD: 0 /100 WBCS — SIGNIFICANT CHANGE UP (ref 0–0)
PCO2 BLDA: 31 MMHG — LOW (ref 32–45)
PH BLDA: 7.32 — LOW (ref 7.35–7.45)
PHOSPHATE SERPL-MCNC: 6.2 MG/DL — HIGH (ref 2.1–4.9)
PLATELET # BLD AUTO: 307 K/UL — SIGNIFICANT CHANGE UP (ref 130–400)
PMV BLD: 10.5 FL — HIGH (ref 7.4–10.4)
PO2 BLDA: 192 MMHG — HIGH (ref 83–108)
POTASSIUM SERPL-MCNC: 4.9 MMOL/L — SIGNIFICANT CHANGE UP (ref 3.5–5)
POTASSIUM SERPL-SCNC: 4.9 MMOL/L — SIGNIFICANT CHANGE UP (ref 3.5–5)
PROT SERPL-MCNC: 4.8 G/DL — LOW (ref 6–8)
RBC # BLD: 2.8 M/UL — LOW (ref 4.2–5.4)
RBC # FLD: 15.4 % — HIGH (ref 11.5–14.5)
SAO2 % BLDA: 99.3 % — HIGH (ref 94–98)
SODIUM SERPL-SCNC: 145 MMOL/L — SIGNIFICANT CHANGE UP (ref 135–146)
T3 SERPL-MCNC: 60 NG/DL — LOW (ref 80–200)
T4 AB SER-ACNC: 4.6 UG/DL — SIGNIFICANT CHANGE UP (ref 4.6–12)
TSH SERPL-MCNC: 2.63 UIU/ML — SIGNIFICANT CHANGE UP (ref 0.27–4.2)
WBC # BLD: 23.2 K/UL — HIGH (ref 4.8–10.8)
WBC # FLD AUTO: 23.2 K/UL — HIGH (ref 4.8–10.8)

## 2024-10-10 PROCEDURE — 99223 1ST HOSP IP/OBS HIGH 75: CPT

## 2024-10-10 PROCEDURE — 70450 CT HEAD/BRAIN W/O DYE: CPT | Mod: 26

## 2024-10-10 PROCEDURE — 95718 EEG PHYS/QHP 2-12 HR W/VEEG: CPT

## 2024-10-10 PROCEDURE — 99291 CRITICAL CARE FIRST HOUR: CPT

## 2024-10-10 RX ORDER — LEVETIRACETAM 500 MG/1
750 TABLET, FILM COATED ORAL EVERY 12 HOURS
Refills: 0 | Status: DISCONTINUED | OUTPATIENT
Start: 2024-10-10 | End: 2024-10-14

## 2024-10-10 RX ORDER — LEVETIRACETAM 500 MG/1
750 TABLET, FILM COATED ORAL ONCE
Refills: 0 | Status: COMPLETED | OUTPATIENT
Start: 2024-10-10 | End: 2024-10-10

## 2024-10-10 RX ORDER — METHYLPREDNISOLONE ACETATE 80 MG/ML
40 INJECTION, SUSPENSION INTRALESIONAL; INTRAMUSCULAR; INTRASYNOVIAL; SOFT TISSUE ONCE
Refills: 0 | Status: COMPLETED | OUTPATIENT
Start: 2024-10-10 | End: 2024-10-10

## 2024-10-10 RX ORDER — DESMOPRESSIN ACETATE 4 UG/ML
19 INJECTION INTRAVENOUS ONCE
Refills: 0 | Status: DISCONTINUED | OUTPATIENT
Start: 2024-10-10 | End: 2024-10-10

## 2024-10-10 RX ORDER — MAGNESIUM SULFATE IN 0.9% NACL 2 G/50 ML
2 INTRAVENOUS SOLUTION, PIGGYBACK (ML) INTRAVENOUS ONCE
Refills: 0 | Status: COMPLETED | OUTPATIENT
Start: 2024-10-10 | End: 2024-10-10

## 2024-10-10 RX ORDER — HYDROCORTISONE 10 MG/1
40 TABLET ORAL EVERY 8 HOURS
Refills: 0 | Status: COMPLETED | OUTPATIENT
Start: 2024-10-10 | End: 2024-10-11

## 2024-10-10 RX ORDER — INSULIN LISPRO 100/ML
VIAL (ML) SUBCUTANEOUS EVERY 6 HOURS
Refills: 0 | Status: DISCONTINUED | OUTPATIENT
Start: 2024-10-10 | End: 2024-10-17

## 2024-10-10 RX ORDER — LABETALOL HCL 200 MG
10 TABLET ORAL ONCE
Refills: 0 | Status: COMPLETED | OUTPATIENT
Start: 2024-10-10 | End: 2024-10-10

## 2024-10-10 RX ADMIN — Medication 4: at 12:10

## 2024-10-10 RX ADMIN — HYDROCORTISONE 40 MILLIGRAM(S): 10 TABLET ORAL at 21:26

## 2024-10-10 RX ADMIN — Medication 6: at 17:24

## 2024-10-10 RX ADMIN — METHYLPREDNISOLONE ACETATE 40 MILLIGRAM(S): 80 INJECTION, SUSPENSION INTRALESIONAL; INTRAMUSCULAR; INTRASYNOVIAL; SOFT TISSUE at 14:18

## 2024-10-10 RX ADMIN — CHLORHEXIDINE GLUCONATE 1 APPLICATION(S): 40 SOLUTION TOPICAL at 06:24

## 2024-10-10 RX ADMIN — LEVETIRACETAM 400 MILLIGRAM(S): 500 TABLET, FILM COATED ORAL at 08:00

## 2024-10-10 RX ADMIN — POLYETHYLENE GLYCOL 3350 17 GRAM(S): 17 POWDER, FOR SOLUTION ORAL at 11:58

## 2024-10-10 RX ADMIN — Medication 2 TABLET(S): at 21:27

## 2024-10-10 RX ADMIN — Medication 25 MICROGRAM(S): at 06:15

## 2024-10-10 RX ADMIN — HYDROCORTISONE 40 MILLIGRAM(S): 10 TABLET ORAL at 19:52

## 2024-10-10 RX ADMIN — CHLORHEXIDINE GLUCONATE 15 MILLILITER(S): 40 SOLUTION TOPICAL at 17:15

## 2024-10-10 RX ADMIN — Medication 25 MICROGRAM(S): at 00:40

## 2024-10-10 RX ADMIN — Medication 25 MICROGRAM(S): at 05:54

## 2024-10-10 RX ADMIN — LEVETIRACETAM 400 MILLIGRAM(S): 500 TABLET, FILM COATED ORAL at 18:03

## 2024-10-10 RX ADMIN — CHLORHEXIDINE GLUCONATE 15 MILLILITER(S): 40 SOLUTION TOPICAL at 06:24

## 2024-10-10 RX ADMIN — Medication 25 MICROGRAM(S): at 19:47

## 2024-10-10 RX ADMIN — Medication 25 MICROGRAM(S): at 19:17

## 2024-10-10 RX ADMIN — Medication 2: at 23:04

## 2024-10-10 RX ADMIN — Medication 25 GRAM(S): at 08:10

## 2024-10-10 RX ADMIN — Medication 10 MILLIGRAM(S): at 00:50

## 2024-10-10 RX ADMIN — FAMOTIDINE 20 MILLIGRAM(S): 10 INJECTION INTRAVENOUS at 12:00

## 2024-10-10 NOTE — PROGRESS NOTE ADULT - SUBJECTIVE AND OBJECTIVE BOX
POD#1/2    S/P Right craniotomy for evac of SDH 10.8.24  S/P MMA Embolization 10.9.24    Pt seen and examined at bedside. Pt remains intubated, sedated with Precidex    Vital Signs Last 24 Hrs  T(C): 36.4 (10 Oct 2024 08:00), Max: 36.7 (09 Oct 2024 15:55)  T(F): 97.5 (10 Oct 2024 08:00), Max: 98 (09 Oct 2024 15:55)  HR: 74 (10 Oct 2024 08:00) (56 - 88)  BP: 114/37 (09 Oct 2024 17:30) (114/37 - 114/37)  BP(mean): 87 (09 Oct 2024 17:30) (87 - 87)  RR: 20 (10 Oct 2024 08:00) (2 - 29)  SpO2: 100% (10 Oct 2024 08:00) (100% - 100%)    Parameters below as of 10 Oct 2024 08:00  Patient On (Oxygen Delivery Method): ventilator    O2 Concentration (%): 35    I&O's Detail    09 Oct 2024 07:01  -  10 Oct 2024 07:00  --------------------------------------------------------  IN:    Dexmedetomidine: 243.9 mL    IV PiggyBack: 50 mL    multiple electrolytes Injection Type 1.: 520 mL    NiCARdipine: 1150 mL    Oral Fluid: 120 mL  Total IN: 2083.9 mL    OUT:    Bulb (mL): 20 mL    Indwelling Catheter - Urethral (mL): 625 mL    Propofol: 0 mL  Total OUT: 645 mL    Total NET: 1438.9 mL      10 Oct 2024 07:01  -  10 Oct 2024 09:28  --------------------------------------------------------  IN:    Dexmedetomidine: 8 mL    IV PiggyBack: 150 mL    NiCARdipine: 75 mL  Total IN: 233 mL    OUT:  Total OUT: 0 mL    Total NET: 233 mL        I&O's Summary    09 Oct 2024 07:01  -  10 Oct 2024 07:00  --------------------------------------------------------  IN: 2083.9 mL / OUT: 645 mL / NET: 1438.9 mL    10 Oct 2024 07:01  -  10 Oct 2024 09:28  --------------------------------------------------------  IN: 233 mL / OUT: 0 mL / NET: 233 mL        REVIEW OF SYSTEMS    [ ] A ten-point review of systems was otherwise negative except as noted.  [X] Due to altered mental status/intubation, subjective information were not able to be obtained from the patient. History was obtained, to the extent possible, from review of the chart and collateral sources of information.      PHYSICAL EXAM:  Neurological:  Intubated, sedated with Precidex  Opens eyes to verbal  Pupils reactive  ? Tracking  w/d all ext to pain  Moves extremities spontaneously  Incision intact  Drain removed. no complications    LABS:                        8.5    23.20 )-----------( 307      ( 10 Oct 2024 04:53 )             24.2     10-10    145  |  116[H]  |  43[H]  ----------------------------<  106[H]  4.9   |  17  |  1.4    Ca    8.5      10 Oct 2024 04:53  Phos  6.2     10-10  Mg     2.2     10-10    TPro  4.8[L]  /  Alb  3.5  /  TBili  0.6  /  DBili  x   /  AST  31  /  ALT  29  /  AlkPhos  85  10-10    PT/INR - ( 08 Oct 2024 19:50 )   PT: 13.60 sec;   INR: 1.19 ratio         PTT - ( 08 Oct 2024 19:50 )  PTT:30.9 sec  Urinalysis Basic - ( 10 Oct 2024 04:53 )    Color: x / Appearance: x / SG: x / pH: x  Gluc: 106 mg/dL / Ketone: x  / Bili: x / Urobili: x   Blood: x / Protein: x / Nitrite: x   Leuk Esterase: x / RBC: x / WBC x   Sq Epi: x / Non Sq Epi: x / Bacteria: x    CAPILLARY BLOOD GLUCOSE      POCT Blood Glucose.: 126 mg/dL (10 Oct 2024 06:29)  POCT Blood Glucose.: 136 mg/dL (10 Oct 2024 01:49)  POCT Blood Glucose.: 163 mg/dL (09 Oct 2024 22:41)  POCT Blood Glucose.: 87 mg/dL (09 Oct 2024 18:33)  POCT Blood Glucose.: 116 mg/dL (09 Oct 2024 15:13)  POCT Blood Glucose.: 191 mg/dL (09 Oct 2024 10:03)    Allergies    No Known Allergies    Intolerances      MEDICATIONS:  Antibiotics:    Neuro:  dexMEDEtomidine Infusion 0.2 MICROgram(s)/kG/Hr IV Continuous <Continuous>  fentaNYL    Injectable 25 MICROGram(s) IV Push every 4 hours PRN  levETIRAcetam  IVPB 750 milliGRAM(s) IV Intermittent every 12 hours  propofol Infusion 10 MICROgram(s)/kG/Min IV Continuous <Continuous>      IVF:      CULTURES:  Culture Results:   No growth at 24 hours (10-08 @ 11:14)  Culture Results:   No growth at 24 hours (10-08 @ 11:14)    ASSESSMENT:  79y Female s/p    Traumatic subdural hemorrhage without loss of consciousness, initial encounter    NOMD    NOMD    NOMD    NOMD    NOMD    Abnormal finding of blood chemistry, unspecified    Acute kidney failure, unspecified    Anemia, unspecified    Chronic cough    Chronic kidney disease, unspecified    Cough, unspecified    Cutaneous abscess of back [any part, except buttock]    Cutaneous abscess of limb, unspecified    Deficiency of other specified B group vitamins    Diarrhea, unspecified    Encounter for antineoplastic chemotherapy    Essential (primary) hypertension    FALL (ON) (FROM) UNSPECIFIED STAIRS AND STEPS, INIT ENCNTR    Fever, unspecified    History of falling    Hypomagnesemia    Long term (current) use of oral hypoglycemic drugs    Myelodysplastic syndrome, unspecified    Other drug-induced agranulocytosis    Other long term (current) drug therapy    Other specified disorders of nose and nasal sinuses    Pain in right knee    Pain in unspecified knee    Personal history of diseases of the blood and blood-forming organs and certain disorders involving the immune mechanism    Personal history of other endocrine, nutritional and metabolic disease    Personal history of other medical treatment    Personal history of other specified conditions    Pneumonia, unspecified organism    Shortness of breath    Synovial cyst of popliteal space [Rios], right knee    Thrombocytopenia, unspecified    Thrombocytosis, unspecified    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 disease    Unspecified abdominal pain    Unspecified fall, initial encounter    Unspecified place or not applicable    Chronic kidney disease, stage 3b    Disorder of kidney and ureter, unspecified    Irritable bowel syndrome with diarrhea    No pertinent family history in first degree relatives    No pertinent family history in first degree relatives    No pertinent family history in first degree relatives    Handoff    MEWS Score    DM (diabetes mellitus)    MDS (myelodysplastic syndrome)    Traumatic subdural hemorrhage with brief coma    Craniotomy, emergent, for subdural hematoma evacuation    Embolization of cerebral vessel    No significant past surgical history    H/O colonoscopy    FALL HIT HEAD    90+    [email or web address]        PLAN:  Continue Neuro Checks  EEG  Care as per Neuro Critical Care team POD#1/2    S/P Right craniotomy for evac of SDH 10.8.24  S/P MMA Embolization 10.9.24    Pt seen and examined at bedside. Pt remains intubated, sedated with Precidex    Vital Signs Last 24 Hrs  T(C): 36.4 (10 Oct 2024 08:00), Max: 36.7 (09 Oct 2024 15:55)  T(F): 97.5 (10 Oct 2024 08:00), Max: 98 (09 Oct 2024 15:55)  HR: 74 (10 Oct 2024 08:00) (56 - 88)  BP: 114/37 (09 Oct 2024 17:30) (114/37 - 114/37)  BP(mean): 87 (09 Oct 2024 17:30) (87 - 87)  RR: 20 (10 Oct 2024 08:00) (2 - 29)  SpO2: 100% (10 Oct 2024 08:00) (100% - 100%)    Parameters below as of 10 Oct 2024 08:00  Patient On (Oxygen Delivery Method): ventilator    O2 Concentration (%): 35    I&O's Detail    09 Oct 2024 07:01  -  10 Oct 2024 07:00  --------------------------------------------------------  IN:    Dexmedetomidine: 243.9 mL    IV PiggyBack: 50 mL    multiple electrolytes Injection Type 1.: 520 mL    NiCARdipine: 1150 mL    Oral Fluid: 120 mL  Total IN: 2083.9 mL    OUT:    Bulb (mL): 20 mL    Indwelling Catheter - Urethral (mL): 625 mL    Propofol: 0 mL  Total OUT: 645 mL    Total NET: 1438.9 mL      10 Oct 2024 07:01  -  10 Oct 2024 09:28  --------------------------------------------------------  IN:    Dexmedetomidine: 8 mL    IV PiggyBack: 150 mL    NiCARdipine: 75 mL  Total IN: 233 mL    OUT:  Total OUT: 0 mL    Total NET: 233 mL        I&O's Summary    09 Oct 2024 07:01  -  10 Oct 2024 07:00  --------------------------------------------------------  IN: 2083.9 mL / OUT: 645 mL / NET: 1438.9 mL    10 Oct 2024 07:01  -  10 Oct 2024 09:28  --------------------------------------------------------  IN: 233 mL / OUT: 0 mL / NET: 233 mL        REVIEW OF SYSTEMS    [ ] A ten-point review of systems was otherwise negative except as noted.  [X] Due to altered mental status/intubation, subjective information were not able to be obtained from the patient. History was obtained, to the extent possible, from review of the chart and collateral sources of information.      PHYSICAL EXAM:  Neurological:  Intubated, sedated with Precidex  Opens eyes to verbal  Pupils reactive  ? Tracking  w/d all ext to pain  Moves extremities spontaneously  Incision intact  Drain removed. no complications    LABS:                        8.5    23.20 )-----------( 307      ( 10 Oct 2024 04:53 )             24.2     10-10    145  |  116[H]  |  43[H]  ----------------------------<  106[H]  4.9   |  17  |  1.4    Ca    8.5      10 Oct 2024 04:53  Phos  6.2     10-10  Mg     2.2     10-10    TPro  4.8[L]  /  Alb  3.5  /  TBili  0.6  /  DBili  x   /  AST  31  /  ALT  29  /  AlkPhos  85  10-10    PT/INR - ( 08 Oct 2024 19:50 )   PT: 13.60 sec;   INR: 1.19 ratio         PTT - ( 08 Oct 2024 19:50 )  PTT:30.9 sec  Urinalysis Basic - ( 10 Oct 2024 04:53 )    Color: x / Appearance: x / SG: x / pH: x  Gluc: 106 mg/dL / Ketone: x  / Bili: x / Urobili: x   Blood: x / Protein: x / Nitrite: x   Leuk Esterase: x / RBC: x / WBC x   Sq Epi: x / Non Sq Epi: x / Bacteria: x    CAPILLARY BLOOD GLUCOSE      POCT Blood Glucose.: 126 mg/dL (10 Oct 2024 06:29)  POCT Blood Glucose.: 136 mg/dL (10 Oct 2024 01:49)  POCT Blood Glucose.: 163 mg/dL (09 Oct 2024 22:41)  POCT Blood Glucose.: 87 mg/dL (09 Oct 2024 18:33)  POCT Blood Glucose.: 116 mg/dL (09 Oct 2024 15:13)  POCT Blood Glucose.: 191 mg/dL (09 Oct 2024 10:03)    Allergies    No Known Allergies    Intolerances      MEDICATIONS:  Antibiotics:    Neuro:  dexMEDEtomidine Infusion 0.2 MICROgram(s)/kG/Hr IV Continuous <Continuous>  fentaNYL    Injectable 25 MICROGram(s) IV Push every 4 hours PRN  levETIRAcetam  IVPB 750 milliGRAM(s) IV Intermittent every 12 hours  propofol Infusion 10 MICROgram(s)/kG/Min IV Continuous <Continuous>      IVF:      CULTURES:  Culture Results:   No growth at 24 hours (10-08 @ 11:14)  Culture Results:   No growth at 24 hours (10-08 @ 11:14)    ASSESSMENT:  79y Female s/p    Traumatic subdural hemorrhage without loss of consciousness, initial encounter    NOMD    NOMD    NOMD    NOMD    NOMD    Abnormal finding of blood chemistry, unspecified    Acute kidney failure, unspecified    Anemia, unspecified    Chronic cough    Chronic kidney disease, unspecified    Cough, unspecified    Cutaneous abscess of back [any part, except buttock]    Cutaneous abscess of limb, unspecified    Deficiency of other specified B group vitamins    Diarrhea, unspecified    Encounter for antineoplastic chemotherapy    Essential (primary) hypertension    FALL (ON) (FROM) UNSPECIFIED STAIRS AND STEPS, INIT ENCNTR    Fever, unspecified    History of falling    Hypomagnesemia    Long term (current) use of oral hypoglycemic drugs    Myelodysplastic syndrome, unspecified    Other drug-induced agranulocytosis    Other long term (current) drug therapy    Other specified disorders of nose and nasal sinuses    Pain in right knee    Pain in unspecified knee    Personal history of diseases of the blood and blood-forming organs and certain disorders involving the immune mechanism    Personal history of other endocrine, nutritional and metabolic disease    Personal history of other medical treatment    Personal history of other specified conditions    Pneumonia, unspecified organism    Shortness of breath    Synovial cyst of popliteal space [Rios], right knee    Thrombocytopenia, unspecified    Thrombocytosis, unspecified    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 disease    Unspecified abdominal pain    Unspecified fall, initial encounter    Unspecified place or not applicable    Chronic kidney disease, stage 3b    Disorder of kidney and ureter, unspecified    Irritable bowel syndrome with diarrhea    No pertinent family history in first degree relatives    No pertinent family history in first degree relatives    No pertinent family history in first degree relatives    Handoff    MEWS Score    DM (diabetes mellitus)    MDS (myelodysplastic syndrome)    Traumatic subdural hemorrhage with brief coma    Craniotomy, emergent, for subdural hematoma evacuation    Embolization of cerebral vessel    No significant past surgical history    H/O colonoscopy    FALL HIT HEAD    90+    [email or web address]        PLAN:  Continue Neuro Checks and extubation per [facility]  No further neurosurgical intervention; primary service transferred to [facility]; patient accepted by Dr. Ortiz  Recommend Hem/onc consult with Dr. Wade pts doctor for MDS and coagulopathy w/u  Care as per Neuro Critical Care team  Will be available as needed

## 2024-10-10 NOTE — CONSULT NOTE ADULT - ASSESSMENT
79-year-old female PMH MDS (follows with Dr. Wade), DM, HTN, CKD BIBEMS after being found unresponsive on floor of her apartment. FOund to have Right SDH s/p craniotomy    # Right SDH s/p craniotomy and evacuation  # s/p MMA embolization 10/9/24:  # MDS    She was previously on REvlimid and Vidaza, currently on Lusparacept with Dr. Wade  Per note, she received 4:1:1: PRBC/ FFP/ Platelets intraoperatively  Repeat INR normal     79-year-old female PMH MDS (follows with Dr. Wade), DM, HTN, CKD BIBEMS after being found unresponsive on floor of her apartment. FOund to have Right SDH s/p craniotomy    # Right SDH s/p craniotomy and evacuation  # s/p MMA embolization 10/9/24:  # MDS    She was previously on REvlimid and Vidaza, currently on Lusparacept with Dr. Wade  Per note, she received 4:1:1: PRBC/ FFP/ Platelets intraoperatively  Repeat INR normal  Hb 6.9 on presentation, repeat is normal 8.5  Transfuse to keep hb >7

## 2024-10-10 NOTE — PROGRESS NOTE ADULT - SUBJECTIVE AND OBJECTIVE BOX
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 Per pt's daughter, pt was LKW yesterday evening. Daughter states this morning pt was not answering the phone and did not open the door for her home health aide. Daughter came to home and found her on the floor, pt was conscious and told daughter "I fell". On initial arrival to ED pt only EO to pain, not following commands, WD extremities. Underwent CTH showing R hemispheric SDH 2.2cm w/ mass effect and 1.1cm MLS. On reassessment in ED pt opening eyes to voice, answering some questions and following simple commands. SBP after CT in 200s, started on Nicardipine gtt. Received 1g IV Keppra, 1850mL LR bolus, and was started on insulin gtt for BP in 400s. Pt assessed by NSGY, going to OR for evacuation. Admitted to NSICU for further management.    On admission   GCS 15       POD #1- s/p craniotomy and hematoma evacuation with improving MLS c/b by postop mild R SD recollection    VITALS: [x] Reviewed    ICU Vital Signs Last 24 Hrs  T(C): 36.7 (10 Oct 2024 04:00), Max: 36.7 (09 Oct 2024 15:55)  T(F): 98 (10 Oct 2024 04:00), Max: 98 (09 Oct 2024 15:55)  HR: 70 (10 Oct 2024 06:30) (56 - 88)  BP: 114/37 (09 Oct 2024 17:30) (114/37 - 114/37)  BP(mean): 87 (09 Oct 2024 17:30) (87 - 87)  ABP: 127/42 (10 Oct 2024 06:30) (99/53 - 142/47)  ABP(mean): 71 (10 Oct 2024 06:30) (59 - 79)  RR: 16 (10 Oct 2024 06:30) (2 - 29)  SpO2: 100% (10 Oct 2024 06:30) (100% - 100%)    O2 Parameters below as of 09 Oct 2024 20:00  Patient On (Oxygen Delivery Method): ventilator    O2 Concentration (%): 40    Mode: AC/ CMV (Assist Control/ Continuous Mandatory Ventilation), RR (machine): 12, TV (machine): 450, FiO2: 40, PEEP: 5, PS: 5, ITime: 1, MAP: 8, PIP: 17    ABG - ( 10 Oct 2024 03:16 )  pH, Arterial: 7.32  pH, Blood: x     /  pCO2: 31    /  pO2: 192   / HCO3: 16    / Base Excess: -8.9  /  SaO2: 99.3          09 Oct 2024 07:01  -  10 Oct 2024 07:00  --------------------------------------------------------  IN:    Dexmedetomidine: 243.9 mL    IV PiggyBack: 50 mL    multiple electrolytes Injection Type 1.: 520 mL    NiCARdipine: 1150 mL    Oral Fluid: 120 mL  Total IN: 2083.9 mL    OUT:    Indwelling Catheter - Urethral (mL): 625 mL    Propofol: 0 mL  Total OUT: 625 mL    Total NET: 1458.9 mL          MEDICATIONS  (STANDING):  chlorhexidine 0.12% Liquid 15 milliLiter(s) Oral Mucosa every 12 hours  chlorhexidine 2% Cloths 1 Application(s) Topical <User Schedule>  dexMEDEtomidine Infusion 0.2 MICROgram(s)/kG/Hr (3 mL/Hr) IV Continuous <Continuous>  dextrose 50% Injectable 25 milliLiter(s) IV Push every 15 minutes  dextrose 50% Injectable 50 milliLiter(s) IV Push every 15 minutes  dextrose 50% Injectable 25 milliLiter(s) IV Push every 15 minutes  famotidine    Tablet 20 milliGRAM(s) Oral daily  insulin lispro (ADMELOG) corrective regimen sliding scale   SubCutaneous every 6 hours  levETIRAcetam  IVPB 750 milliGRAM(s) IV Intermittent every 12 hours  levETIRAcetam  IVPB 750 milliGRAM(s) IV Intermittent once  magnesium sulfate  IVPB 2 Gram(s) IV Intermittent once  niCARdipine Infusion 5 mG/Hr (25 mL/Hr) IV Continuous <Continuous>  polyethylene glycol 3350 17 Gram(s) Oral daily  propofol Infusion 10 MICROgram(s)/kG/Min (3.6 mL/Hr) IV Continuous <Continuous>  senna 2 Tablet(s) Oral at bedtime    MEDICATIONS  (PRN):  fentaNYL    Injectable 25 MICROGram(s) IV Push every 4 hours PRN CPOT 4-6    10-10    145  |  116[H]  |  43[H]  ----------------------------<  106[H]  4.9   |  17  |  1.4    Ca    8.5      10 Oct 2024 04:53  Phos  6.2     10-10  Mg     2.2     10-10    TPro  4.8[L]  /  Alb  3.5  /  TBili  0.6  /  DBili  x   /  AST  31  /  ALT  29  /  AlkPhos  85  10-10                            8.5    23.20 )-----------( 307      ( 10 Oct 2024 04:53 )             24.2     HGBA1C- 6.0  Procalcitonin- 7.0      EXAMINATION:  General: WN/WD, no acute distress  HENT: NC/AT, moist oral mucosa, orally intubated  Eyes: Anicteric sclerae  Cardiac: [id number]  Lungs: Clear  Abdomen: Soft, non-tender, +BS  Extremities: No c/c/e  Skin/Incision Site: Clean, dry and intact  Neurologic: precedex/prop gtt on board, partially sedated, arousable to tactile, midline gaze not tracking, no command following, perrl, symmetric face, spont on all four and purposeful                         SUMMARY: 79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 Per pt's daughter, pt was LKW yesterday evening. Daughter states this morning pt was not answering the phone and did not open the door for her home health aide. Daughter came to home and found her on the floor, pt was conscious and told daughter "I fell". On initial arrival to ED pt only EO to pain, not following commands, WD extremities. Underwent CTH showing R hemispheric SDH 2.2cm w/ mass effect and 1.1cm MLS. On reassessment in ED pt opening eyes to voice, answering some questions and following simple commands. SBP after CT in 200s, started on Nicardipine gtt. Received 1g IV Keppra, 1850mL LR bolus, and was started on insulin gtt for BP in 400s. Pt assessed by NSGY, going to OR for evacuation. Admitted to NSICU for further management.    On admission   GCS 15       POD #2- s/p craniotomy and hematoma evacuation with improving MLS c/b by postop mild R SD recollection               S/P 4 U PRBC / i FFP/i PLT               Intubated   POD #1- S/P MMA      VITALS: [x] Reviewed    ICU Vital Signs Last 24 Hrs  T(C): 36.7 (10 Oct 2024 04:00), Max: 36.7 (09 Oct 2024 15:55)  T(F): 98 (10 Oct 2024 04:00), Max: 98 (09 Oct 2024 15:55)  HR: 70 (10 Oct 2024 06:30) (56 - 88)  BP: 114/37 (09 Oct 2024 17:30) (114/37 - 114/37)  BP(mean): 87 (09 Oct 2024 17:30) (87 - 87)  ABP: 127/42 (10 Oct 2024 06:30) (99/53 - 142/47)  ABP(mean): 71 (10 Oct 2024 06:30) (59 - 79)  RR: 16 (10 Oct 2024 06:30) (16 - 29)  SpO2: 100% (10 Oct 2024 06:30) (100% - 100%)    O2 Parameters below as of 09 Oct 2024 20:00  Patient On (Oxygen Delivery Method): ventilator    O2 Concentration (%): 40    Mode: AC/ CMV (Assist Control/ Continuous Mandatory Ventilation), RR (machine): 16, TV (machine): 400, FiO2: 40, PEEP: 5, PS: 5, ITime: 1, MAP: 8, PIP: 17    ABG - ( 10 Oct 2024 03:16 )  pH, Arterial: 7.32  pH, Blood: x     /  pCO2: 31    /  pO2: 192   / HCO3: 16    / Base Excess: -8.9  /  SaO2: 99.3          09 Oct 2024 07:01  -  10 Oct 2024 07:00  --------------------------------------------------------  IN:    Dexmedetomidine: 243.9 mL    IV PiggyBack: 50 mL    multiple electrolytes Injection Type 1.: 520 mL    NiCARdipine: 1150 mL    Oral Fluid: 120 mL  Total IN: 2083.9 mL    OUT:    Indwelling Catheter - Urethral (mL): 625 mL    Propofol: 0 mL  Total OUT: 625 mL    Total NET: 1458.9 mL          MEDICATIONS  (STANDING):  chlorhexidine 0.12% Liquid 15 milliLiter(s) Oral Mucosa every 12 hours  chlorhexidine 2% Cloths 1 Application(s) Topical <User Schedule>  dexMEDEtomidine Infusion 0.2 MICROgram(s)/kG/Hr (3 mL/Hr) IV Continuous <Continuous>  dextrose 50% Injectable 25 milliLiter(s) IV Push every 15 minutes  dextrose 50% Injectable 50 milliLiter(s) IV Push every 15 minutes  dextrose 50% Injectable 25 milliLiter(s) IV Push every 15 minutes  famotidine    Tablet 20 milliGRAM(s) Oral daily  insulin lispro (ADMELOG) corrective regimen sliding scale   SubCutaneous every 6 hours  levETIRAcetam  IVPB 750 milliGRAM(s) IV Intermittent every 12 hours  levETIRAcetam  IVPB 750 milliGRAM(s) IV Intermittent once  magnesium sulfate  IVPB 2 Gram(s) IV Intermittent once  niCARdipine Infusion 5 mG/Hr (25 mL/Hr) IV Continuous <Continuous>  polyethylene glycol 3350 17 Gram(s) Oral daily  senna 2 Tablet(s) Oral at bedtime    MEDICATIONS  (PRN):  fentaNYL    Injectable 25 MICROGram(s) IV Push every 4 hours PRN CPOT 4-6      CAPILLARY BLOOD GLUCOSE  POCT Blood Glucose.: 220 mg/dL (10 Oct 2024 12:02)  POCT Blood Glucose.: 126 mg/dL (10 Oct 2024 06:29)  POCT Blood Glucose.: 136 mg/dL (10 Oct 2024 01:49)  POCT Blood Glucose.: 163 mg/dL (09 Oct 2024 22:41)  POCT Blood Glucose.: 87 mg/dL (09 Oct 2024 18:33)  POCT Blood Glucose.: 116 mg/dL (09 Oct 2024 15:13)      10-10    145  |  116[H]  |  43[H]  ----------------------------<  106[H] AG- - 13   4.9   |  17  |  1.4    Ca    8.5      10 Oct 2024 04:53  Phos  6.2     10-10  Mg     2.2     10-10    TPro  4.8[L]  /  Alb  3.5  /  TBili  0.6  /  DBili  x   /  AST  31  /  ALT  29  /  AlkPhos  85  10-10                            8.5    23.20 )-----------( 307      ( 10 Oct 2024 04:53 )             24.2     HGBA1C- 6.0  Procalcitonin- 7.0    CXR - Clear       EXAMINATION:  General: WN/WD, no acute distress  HENT: NC/AT, moist oral mucosa, orally intubated  Eyes: Anicteric sclerae  Cardiac: [id number]  Lungs: Clear  Abdomen: Soft, non-tender, +BS  Extremities: No c/c/e  Skin/Incision Site: Clean, dry and intact  Neurologic: precedex/prop gtt on board, partially sedated, arousable to tactile, midline gaze not tracking, no command following, perrl, symmetric face, spont on all four and purposeful                         SUMMARY: 79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 Per pt's daughter, pt was LKW yesterday evening. Daughter states this morning pt was not answering the phone and did not open the door for her home health aide. Daughter came to home and found her on the floor, pt was conscious and told daughter "I fell". On initial arrival to ED pt only EO to pain, not following commands, WD extremities. Underwent CTH showing R hemispheric SDH 2.2cm w/ mass effect and 1.1cm MLS. On reassessment in ED pt opening eyes to voice, answering some questions and following simple commands. SBP after CT in 200s, started on Nicardipine gtt. Received 1g IV Keppra, 1850mL LR bolus, and was started on insulin gtt for BP in 400s. Pt assessed by NSGY, going to OR for evacuation. Admitted to NSICU for further management.    On admission   GCS 15       POD #2- s/p craniotomy and hematoma evacuation with improving MLS c/b by postop mild R SD recollection               S/P 4 U PRBC / i FFP/i PLT               Intubated   POD #1- S/P MMA      VITALS: [x] Reviewed    ICU Vital Signs Last 24 Hrs  T(C): 36.7 (10 Oct 2024 04:00), Max: 36.7 (09 Oct 2024 15:55)  T(F): 98 (10 Oct 2024 04:00), Max: 98 (09 Oct 2024 15:55)  HR: 70 (10 Oct 2024 06:30) (56 - 88)  BP: 114/37 (09 Oct 2024 17:30) (114/37 - 114/37)  BP(mean): 87 (09 Oct 2024 17:30) (87 - 87)  ABP: 127/42 (10 Oct 2024 06:30) (99/53 - 142/47)  ABP(mean): 71 (10 Oct 2024 06:30) (59 - 79)  RR: 16 (10 Oct 2024 06:30) (16 - 29)  SpO2: 100% (10 Oct 2024 06:30) (100% - 100%)    O2 Parameters below as of 09 Oct 2024 20:00  Patient On (Oxygen Delivery Method): ventilator    O2 Concentration (%): 40    Mode: AC/ CMV (Assist Control/ Continuous Mandatory Ventilation), RR (machine): 16, TV (machine): 400, FiO2: 40, PEEP: 5, PS: 5, ITime: 1, MAP: 8, PIP: 17    ABG - ( 10 Oct 2024 03:16 )  pH, Arterial: 7.32  pH, Blood: x     /  pCO2: 31    /  pO2: 192   / HCO3: 16    / Base Excess: -8.9  /  SaO2: 99.3          09 Oct 2024 07:01  -  10 Oct 2024 07:00  --------------------------------------------------------  IN:    Dexmedetomidine: 243.9 mL    IV PiggyBack: 50 mL    multiple electrolytes Injection Type 1.: 520 mL    NiCARdipine: 1150 mL    Oral Fluid: 120 mL  Total IN: 2083.9 mL    OUT:    Indwelling Catheter - Urethral (mL): 625 mL    Propofol: 0 mL  Total OUT: 625 mL    Total NET: 1458.9 mL          MEDICATIONS  (STANDING):  chlorhexidine 0.12% Liquid 15 milliLiter(s) Oral Mucosa every 12 hours  chlorhexidine 2% Cloths 1 Application(s) Topical <User Schedule>  dexMEDEtomidine Infusion 0.2 MICROgram(s)/kG/Hr (3 mL/Hr) IV Continuous <Continuous>  dextrose 50% Injectable 25 milliLiter(s) IV Push every 15 minutes  dextrose 50% Injectable 50 milliLiter(s) IV Push every 15 minutes  dextrose 50% Injectable 25 milliLiter(s) IV Push every 15 minutes  famotidine    Tablet 20 milliGRAM(s) Oral daily  insulin lispro (ADMELOG) corrective regimen sliding scale   SubCutaneous every 6 hours  levETIRAcetam  IVPB 750 milliGRAM(s) IV Intermittent every 12 hours  levETIRAcetam  IVPB 750 milliGRAM(s) IV Intermittent once  magnesium sulfate  IVPB 2 Gram(s) IV Intermittent once  niCARdipine Infusion 5 mG/Hr (25 mL/Hr) IV Continuous <Continuous>  polyethylene glycol 3350 17 Gram(s) Oral daily  senna 2 Tablet(s) Oral at bedtime    MEDICATIONS  (PRN):  fentaNYL    Injectable 25 MICROGram(s) IV Push every 4 hours PRN CPOT 4-6      CAPILLARY BLOOD GLUCOSE  POCT Blood Glucose.: 220 mg/dL (10 Oct 2024 12:02)  POCT Blood Glucose.: 126 mg/dL (10 Oct 2024 06:29)  POCT Blood Glucose.: 136 mg/dL (10 Oct 2024 01:49)  POCT Blood Glucose.: 163 mg/dL (09 Oct 2024 22:41)  POCT Blood Glucose.: 87 mg/dL (09 Oct 2024 18:33)  POCT Blood Glucose.: 116 mg/dL (09 Oct 2024 15:13)      10-10    145  |  116[H]  |  43[H]  ----------------------------<  106[H] AG- - 13   4.9   |  17  |  1.4    Ca    8.5      10 Oct 2024 04:53  Phos  6.2     10-10  Mg     2.2     10-10    TPro  4.8[L]  /  Alb  3.5  /  TBili  0.6  /  DBili  x   /  AST  31  /  ALT  29  /  AlkPhos  85  10-10                            8.5    23.20 )-----------( 307      ( 10 Oct 2024 04:53 )             24.2     HGBA1C- 6.0  Procalcitonin- 7.0    CXR - Clear      CT Head No Cont (10.10.24 @ 06:08) >    IMPRESSION:    Stable right larger than left cerebral convexity subdural hemorrhage with   leftward midline shift measuring up to 0.5 cm compared to the prior CT   head from 10/9/2024    Stable right sided craniotomy with subdural drain in place. Slightly   increased overlying scalp swelling.          EEG- 10/9- 10/10   DESCRIPTION OF RECORDING:   The background consists of generalized polymorphic synchronous delta which is generally medium in amplitude.     No superimposed faster frequencies are present.     No normal features of wakefulness, drowsiness or sleep are present.     Hyperventilation was not performed.     Photic stimulation was not performed.     SPECIAL FINDINGS:   The recording shows triphasic waves.    EEG CLASSIFICATION:   Findings: Abnormal.     Generalized background slowing polymorphic delta.     Special findings: Triphasic waves.    INTERPRETATION:   This is an abnormal EEG consistent with severe diffuse cerebral dysfunction.       EXAMINATION:  General: WN/WD, no acute distress  HENT: NC/AT, moist oral mucosa, orally intubated  Eyes: Anicteric sclerae  Cardiac: [id number]  Lungs: Clear  Abdomen: Soft, non-tender, +BS  Extremities: No c/c/e  Skin/Incision Site: Clean, dry and intact  Neurologic: precedex/prop gtt on board, partially sedated, arousable to tactile, midline gaze not tracking, no command following, perrl, symmetric face, spont on all four and purposeful

## 2024-10-10 NOTE — CONSULT NOTE ADULT - CONSULT REASON
MDS and coagulopathy, here for fall and SDH
Traumatic right SDH
s/p fall, trauma consult
SDH
SDH for MMA embolization

## 2024-10-10 NOTE — PATIENT PROFILE ADULT - TRANSPORTATION
FOLLOW-UP VISIT      The patient returns for follow-up s/p right knee video arthroscopy with chondroplasty patella    Date of Surgery    12/27/2021    Wound Status      Incisions are healing well with no surrounding erythema, and minimal ecchymosis. Exam    He is improving slowly. He had a period of time when he had to stop therapy because she got Covid and his family did also so now is beyond the 10 days and start physical therapy again today. He says he can kneel down his left knee now but he can his right knee which is recovering from. I recommend a prescription for diclofenac gel to be applied to 1 of both knees 2-3 times a day. He should continue physical therapy and return here in 4 weeks.     Plan    As above    Follow-up Appointment    4 weeks
no

## 2024-10-10 NOTE — CONSULT NOTE ADULT - ATTENDING COMMENTS
ACS Attending Note Attestation    Patient is examined and evaluated at the bedside with the residents. Treatment plan discussed with the team, nurses, and consulting physicians and consulting teams. Medications, radiological studies and all other relevant studies reviewed. I reviewed the resident note and agreed with above assessment and plan with following additions and corrections. Time devoted to teaching and to any procedures I billed separately is not included.     SIMONA KUHN Patient is a 79y old  Female presented after fall, + HT, ? LOC, - AC. Traumatic subdural hemorrhage without loss of consciousness. I saw the patient after her surgery, she was intubated and sedated. I was not able to converse with her to elicit the history. The details of the HPI is from her chart and conversing with her care team    Physical Exam:   I independently performed a medically appropriate exam. The exam was notable for the patient being intubated and sedated  bilateral pupillary reflex were intact   no apparent injury on my exam.     Allergies    No Known Allergies      PAST MEDICAL & SURGICAL HISTORY:  DM (diabetes mellitus)  MDS (myelodysplastic syndrome)  H/O colonoscopy          Labs: I have reviewed the labs on system  Hb: 10.8, WBC: 12  INR: 1.19  B  K: 5.4, Na: 142    Radiology: I have reviewed and interpreted the imaging  CT Head ( pre op): large right fronto parietal hematoma,  CT C spine: no acute fracture  CT Chest Abdomen and Pelvis:  no acute traumatic injury  Ct T and L spine: -ve for acute fracture     Assessment:   78 yo F wutg myelodysplastic syndrome, presented with large right sub dural hematoma after mechanical fall, s/p right sided craniotomy for evacuation of hematoma   no other traumatic injuries    Plan:	  Remaining management as per neurosurgery and neuro critical care     [40 ]       minutes spent on total encounter. The necessity of the time above spent during the encounter on this date of service as described above.    Gee Staples MD  Trauma/ACS/Surgical Critical care Attending
Agree with above A/P
acute tSDH with leftward shift, GCS 13, c/b lactic acidosis, hyperglycemia, NSICU consulted for evaluation and admission    tSDH  lactic acidosis  DM with hyperglycemia  MDS  anemia  leukocytosis    neurosurgical consultation for emergency crani and hematoma evacuation  q1 neuro  keppra for seizure ppx  sbp >110, SBP <150  insulin coverage for goal FSBS 140-180  IVF   Na goal 135-145  infectious workup, trend lactate  no sequelae of bleeding on exam or CT c/a/p, transfuse for Hb <7, b12 level, pt follows with hematology at [facility]  trauma consultation  coags, SCDs,    dispo- nsicu after OR

## 2024-10-10 NOTE — PATIENT PROFILE ADULT - FALL HARM RISK - HARM RISK INTERVENTIONS

## 2024-10-10 NOTE — EEG REPORT - EPILEPTIFORM DISCHARGE
There are nearly continuous left frontotemporal triphasic sharp waves. Presence of continuous lead artifact prevents observation of discharges in any other location

## 2024-10-10 NOTE — PATIENT PROFILE ADULT - FUNCTIONAL ASSESSMENT - DAILY ACTIVITY 3.
1 = Total assistance
62-year-old female past medical history of TBI, multiple right knee replacement surgeries with revisions sent to ED by Dr. Faith Gonzalez for admission.  Patient most recently had repair to right knee on 3/20/2024, Patient was doing well until noticing some purulent drainage from the medial knee yesterday afternoon. She spoke with  and he reccomended she come to the ER for admission and possible I&D in the OR/ monitoring of the wound.    Denies fever, chills, numbness/swelling, weakness.

## 2024-10-10 NOTE — CONSULT NOTE ADULT - SUBJECTIVE AND OBJECTIVE BOX
Hematology Consult Note    HPI:      Allergies    No Known Allergies    Intolerances        MEDICATIONS  (STANDING):  chlorhexidine 0.12% Liquid 15 milliLiter(s) Oral Mucosa every 12 hours  chlorhexidine 2% Cloths 1 Application(s) Topical <User Schedule>  dexMEDEtomidine Infusion 0.2 MICROgram(s)/kG/Hr (3 mL/Hr) IV Continuous <Continuous>  dextrose 50% Injectable 25 milliLiter(s) IV Push every 15 minutes  dextrose 50% Injectable 50 milliLiter(s) IV Push every 15 minutes  dextrose 50% Injectable 25 milliLiter(s) IV Push every 15 minutes  famotidine    Tablet 20 milliGRAM(s) Oral daily  insulin lispro (ADMELOG) corrective regimen sliding scale   SubCutaneous every 6 hours  levETIRAcetam  IVPB 750 milliGRAM(s) IV Intermittent every 12 hours  niCARdipine Infusion 5 mG/Hr (25 mL/Hr) IV Continuous <Continuous>  polyethylene glycol 3350 17 Gram(s) Oral daily  propofol Infusion 10 MICROgram(s)/kG/Min (3.6 mL/Hr) IV Continuous <Continuous>  senna 2 Tablet(s) Oral at bedtime    MEDICATIONS  (PRN):  fentaNYL    Injectable 25 MICROGram(s) IV Push every 4 hours PRN CPOT 4-6      PAST MEDICAL & SURGICAL HISTORY:  DM (diabetes mellitus)      MDS (myelodysplastic syndrome)      H/O colonoscopy          FAMILY HISTORY:      SOCIAL HISTORY: No EtOH, no tobacco    REVIEW OF SYSTEMS:  REmains intubated  Moved both upper extremity on verbal stimulus    Height (cm): 162.6 (10-09 @ 17:30)  Weight (kg): 64.3 (10-09 @ 17:30)  BMI (kg/m2): 24.3 (10-09 @ 17:30)  BSA (m2): 1.69 (10-09 @ 17:30)    T(F): 97.5 (10-10-24 @ 08:00), Max: 98 (10-09-24 @ 15:55)  HR: 84 (10-10-24 @ 10:00)  BP: 114/37 (10-09-24 @ 17:30)  RR: 19 (10-10-24 @ 10:00)  SpO2: 100% (10-10-24 @ 10:00)  Wt(kg): --    Examination:  Intubated, on precedex  Moves both upper extremity on verbal stimulus                        8.5    23.20 )-----------( 307      ( 10 Oct 2024 04:53 )             24.2       10-10    145  |  116[H]  |  43[H]  ----------------------------<  106[H]  4.9   |  17  |  1.4    Ca    8.5      10 Oct 2024 04:53  Phos  6.2     10-10  Mg     2.2     10-10    TPro  4.8[L]  /  Alb  3.5  /  TBili  0.6  /  DBili  x   /  AST  31  /  ALT  29  /  AlkPhos  85  10-10      Magnesium: 2.2 mg/dL (10-10 @ 04:53)  Phosphorus: 6.2 mg/dL (10-10 @ 04:53)    < from: CT Head No Cont (10.08.24 @ 18:43) >  Status post right frontal temporal parietal craniotomy for evacuation of   subdural hemorrhage. There is a mixed density extra-axial collection and   pneumocephalus under the craniotomy site measuring up to 7 mm likely   postoperative.    Decreased mass effect on the right lateral ventricle and right occipital   horn. Interval decrease of left midline shift now measuring approximately   2.5mm (previously 11 mm).    The visualized orbits are within normal limits. Moderateethmoid mucosal   thickening increased from prior. Stable mucosal thickening of the   maxillary and sphenoid sinuses. Mastoid air cells are clear.

## 2024-10-10 NOTE — PROGRESS NOTE ADULT - SUBJECTIVE AND OBJECTIVE BOX
NEUROENDOVASCULAR CONSULT    HPI:  Patient is a 79 year old female history of MDS, DM, HTN, CKD presented unresponsive found on CTH with large right SDH with MLS, now s/p crani evacuation with the neurosurgery team. Now with post-op CTH showing reaccumulation of the SDH s/p crani evacuation. S/p DSA + MMA embolization yesterday 10/9. Patient remains intubated on precedex in neuro ICU     MEDICATIONS  Home Medications:  glimepiride 2 mg oral tablet: 1 tab(s) orally 2 times a day (08 Oct 2024 14:25)  Jardiance 10 mg oral tablet: 1 tab(s) orally once a day (08 Oct 2024 14:23)  lisinopril 10 mg oral tablet: 1 tab(s) orally once a day (08 Oct 2024 14:23)  pioglitazone 30 mg oral tablet: 1 tab(s) orally once a day (08 Oct 2024 14:25)  SPS 15 GM/60 ML SUSPENSION: TAKE 15ML TWICE WEEKLY (08 Oct 2024 14:25)    MEDICATIONS  (STANDING):  chlorhexidine 0.12% Liquid 15 milliLiter(s) Oral Mucosa every 12 hours  chlorhexidine 2% Cloths 1 Application(s) Topical <User Schedule>  desmopressin IVPB 19 MICROGram(s) IV Intermittent once  dexMEDEtomidine Infusion 0.2 MICROgram(s)/kG/Hr (3 mL/Hr) IV Continuous <Continuous>  dextrose 50% Injectable 50 milliLiter(s) IV Push every 15 minutes  dextrose 50% Injectable 25 milliLiter(s) IV Push every 15 minutes  dextrose 50% Injectable 25 milliLiter(s) IV Push every 15 minutes  famotidine    Tablet 20 milliGRAM(s) Oral daily  insulin lispro (ADMELOG) corrective regimen sliding scale   SubCutaneous every 6 hours  levETIRAcetam  IVPB 750 milliGRAM(s) IV Intermittent every 12 hours  niCARdipine Infusion 5 mG/Hr (25 mL/Hr) IV Continuous <Continuous>  polyethylene glycol 3350 17 Gram(s) Oral daily  propofol Infusion 10 MICROgram(s)/kG/Min (3.6 mL/Hr) IV Continuous <Continuous>  senna 2 Tablet(s) Oral at bedtime    MEDICATIONS  (PRN):  fentaNYL    Injectable 25 MICROGram(s) IV Push every 4 hours PRN CPOT 4-6      FAMILY HISTORY:    SOCIAL HISTORY: negative for tobacco, alcohol, or ilicit drug use.    Allergies    No Known Allergies    Intolerances            NEURO EXAM:   MENTAL STATUS: Intubated and on precedex. Does not follow commands, but resists eye opening and pushes away volitionally when limbs are being examined. Not tracking  CRANIAL NERVES:  II: Pupils equal and reactive, no RAPD, normal visual field and fundus  III, IV, VI:  no gaze preference or deviation  V: Unable to assess   VII: no facial asymmetry  VIII: Unable to assess   MOTOR: Moves RUE antigravity spontaneously. LUE moving proximally spontaneously, no antigravity movement observed. b/l LE antigravity spontaneously  REFLEXES: 2+ b/l UE. Patellars mute. Downgoing toes b/l  SENSORY: Withdraws to pain in all extremities.   COORD: Unable to assess.       LABS:                        8.5    23.20 )-----------( 307      ( 10 Oct 2024 04:53 )             24.2     10-10    145  |  116[H]  |  43[H]  ----------------------------<  106[H]  4.9   |  17  |  1.4    Ca    8.5      10 Oct 2024 04:53  Phos  6.2     10-10  Mg     2.2     10-10    TPro  4.8[L]  /  Alb  3.5  /  TBili  0.6  /  DBili  x   /  AST  31  /  ALT  29  /  AlkPhos  85  10-10    Hemoglobin A1C:   Vitamin B12   PT/INR - ( 08 Oct 2024 19:50 )   PT: 13.60 sec;   INR: 1.19 ratio         PTT - ( 08 Oct 2024 19:50 )  PTT:30.9 sec  CAPILLARY BLOOD GLUCOSE      POCT Blood Glucose.: 126 mg/dL (10 Oct 2024 06:29)      Urinalysis Basic - ( 10 Oct 2024 04:53 )    Color: x / Appearance: x / SG: x / pH: x  Gluc: 106 mg/dL / Ketone: x  / Bili: x / Urobili: x   Blood: x / Protein: x / Nitrite: x   Leuk Esterase: x / RBC: x / WBC x   Sq Epi: x / Non Sq Epi: x / Bacteria: x        ABG - ( 10 Oct 2024 03:16 )  pH, Arterial: 7.32  pH, Blood: x     /  pCO2: 31    /  pO2: 192   / HCO3: 16    / Base Excess: -8.9  /  SaO2: 99.3                Microbiology:    Culture - Blood (collected 08 Oct 2024 11:14)  Source: .Blood BLOOD  Preliminary Report (09 Oct 2024 21:00):    No growth at 24 hours    Culture - Blood (collected 08 Oct 2024 11:14)  Source: .Blood BLOOD  Preliminary Report (09 Oct 2024 21:00):    No growth at 24 hours        RADIOLOGY, EKG AND ADDITIONAL TESTS: Reviewed.

## 2024-10-10 NOTE — PROGRESS NOTE ADULT - ASSESSMENT
INCOMPLETE NOTE.     79 year old female s/p right crani evacuation of SDH now with increased SDH collection s/p evacuation, DSA and MMA embolization. No oozing on radial site, no hematoma expansion, pulse patent. Patient intubated and sedated but interactive with the environment. Slight decrease in spontaneous LUE movement compared to the right. No post-op complications.     Recommendations:       - Discussed with neuro ICU and Dr Shepard   [id number]    79 year old female s/p right crani evacuation of SDH now with increased SDH collection s/p evacuation, DSA and MMA embolization. No oozing on radial site, no hematoma expansion, pulse patent. Patient intubated and sedated but interactive with the environment. Slight decrease in spontaneous LUE movement compared to the right. No post-op complications.     Recommendations:  - c/w q1h neuro checks  - Wean to extubate  - c/w tight BP control   - Rest of management per neuro ICU  [phone]    79 year old female s/p right crani evacuation of SDH now with increased SDH collection s/p evacuation, DSA and MMA embolization. No oozing on radial site, no hematoma expansion, pulse patent. Patient intubated and sedated but interactive with the environment. Slight decrease in spontaneous LUE movement compared to the right. No post-op complications.     Recommendations:  - c/w q1h neuro checks  - Wean to extubate  - Rest of management per neuro ICU  - Pt will followed outpatient by Dr. Shepard 1 month after discharge. Repeat CTA before visit.   [phone]

## 2024-10-10 NOTE — PROGRESS NOTE ADULT - ASSESSMENT
ASSESSMENT/PLAN:  79F w/ PMHx of MDS, DM, HTN, CKD presents after fall at home, found to have R SDH.     NEURO:  q1h neurochecks  s/p crani and hematoma evacuation on 10/8 with drain placement, monitor output  repeat cth today  MMA embo  NSGY following  analgo-sedation- precedex/fentanyl prn   Activity: [] OOB as tolerated [] Bedrest [] PT [] OT [] PMNR    PULM:  lung protective vent support   PSV trial as tolerates  Pt remaining intubated for interval stability CTH (given worsening subdural hematoma after initial improvement, planmned procedure today ,and no command following)  HOB>30  Incentive spirometry  Aspiration precautions    CV:  -130  Nicardipine gtt  Takes Lisinopril (held for hyperK), Nifedipine at home  echo     RENAL: ?CKD  gentle IVF  Monitor I&Os, ocampo     GI:  NPO  GI prophylaxis [] not indicated [] PPI [x] pepcid   Bowel regimen [x] miralax [x] senna [] other:    ENDO:   Goal -180  Elevated BG/AG on arrival  Normal B-hydroxybutyrate  Started on insulin gtt in ED, repeat FS and transitioned to SQ  Hold glimepiride, jardiance, pioglitazone  a1c  TSH     HEME/ONC:  VTE prophylaxis: [x] SCDs [] chemoprophylaxis [x] hold chemoprophylaxis due to: SDH  venous dopplers b/l LEs  MDS hx (follows with Dr Wade)- s/p 4:1:1 of red/ffp/plt intraoperatively  inr normal on 10/8 in pm    ID:  Symone-op antibiotics  UA negative  F/u blood cx    MISC:    SOCIAL/FAMILY:  [] awaiting [x] updated at bedside [] family meeting    CODE STATUS:  [x] Full Code [] DNR [] DNI [] Palliative/Comfort Care    DISPOSITION:  [x] ICU [] Stroke Unit [] Floor [] CEU [] Tele ASSESSMENT/PLAN:  79F w/ PMHx of MDS, DM, HTN, CKD presents after fall at home, found to have R SDH.   POD #2/ # 1 s/p crani and hematoma evacuation and MMA embolization     NEURO:  q1h neurochecks  repeat cth today- stable - R Holo acute SDH - 4.5 R---> L shift   NSGY following  analgo-sedation- precedex/fentanyl prn   Activity: [X] Bed rest     PULM: Resp failure secondary to neuro injury   - Weaning parameterr  - Weaning Precedex- RASS  0 to -1  - Solumedrol 40mg IV X1   - CPAP 5/5   lung protective vent support   PSV trial as tolerates  HOB>30  Aspiration precautions    CV:  -130  Nicardipine gtt  Takes Lisinopril (held for hyperK), Nifedipine at home  echo     RENAL: ?CKD- stable / Hyperphostemia  - Will monitor electrolytes   Monitor I&Os, ocampo     GI:  NPO for possible extubation   GI prophylaxis [] not indicated [] PPI [x] pepcid   Bowel regimen [x] miralax [x] senna [] other:    ENDO:  Type 2 DM   Goal -180  - Monitor FS - HISS   Normal B-hydroxybutyrate- 0.4   Hold glimepiride, jardiance, pioglitazone  a1c  TSH     HEME/ONC:  MDS   VTE prophylaxis: [x] SCDs [ [x] hold chemoprophylaxis due to: SDH await 48 hrs Stable CT   venous dopplers b/l LEs- neg - 10/9/24   MDS hx (follows with Dr Wade)- s/p 4:1:1 of red/ffp/plt intraoperatively  inr normal on 10/8 in pm    ID:  Symone-op antibiotics  UA negative  F/u blood cx    MISC:    SOCIAL/FAMILY:  [x] updated at bedside [] family meeting    CODE STATUS:  [x] Full Code     DISPOSITION:  [x] ICU

## 2024-10-11 LAB
ALBUMIN SERPL ELPH-MCNC: 3.5 G/DL — SIGNIFICANT CHANGE UP (ref 3.5–5.2)
ALP SERPL-CCNC: 86 U/L — SIGNIFICANT CHANGE UP (ref 30–115)
ALT FLD-CCNC: 24 U/L — SIGNIFICANT CHANGE UP (ref 0–41)
ANION GAP SERPL CALC-SCNC: 7 MMOL/L — SIGNIFICANT CHANGE UP (ref 7–14)
AST SERPL-CCNC: 21 U/L — SIGNIFICANT CHANGE UP (ref 0–41)
BASE EXCESS BLDA CALC-SCNC: -8.8 MMOL/L — LOW (ref -2–3)
BASOPHILS # BLD AUTO: 0.02 K/UL — SIGNIFICANT CHANGE UP (ref 0–0.2)
BASOPHILS NFR BLD AUTO: 0.1 % — SIGNIFICANT CHANGE UP (ref 0–1)
BILIRUB SERPL-MCNC: 0.6 MG/DL — SIGNIFICANT CHANGE UP (ref 0.2–1.2)
BUN SERPL-MCNC: 49 MG/DL — HIGH (ref 10–20)
CALCIUM SERPL-MCNC: 8.7 MG/DL — SIGNIFICANT CHANGE UP (ref 8.4–10.4)
CHLORIDE SERPL-SCNC: 119 MMOL/L — HIGH (ref 98–110)
CO2 SERPL-SCNC: 17 MMOL/L — SIGNIFICANT CHANGE UP (ref 17–32)
CREAT SERPL-MCNC: 1.6 MG/DL — HIGH (ref 0.7–1.5)
EGFR: 33 ML/MIN/1.73M2 — LOW
EOSINOPHIL # BLD AUTO: 0 K/UL — SIGNIFICANT CHANGE UP (ref 0–0.7)
EOSINOPHIL NFR BLD AUTO: 0 % — SIGNIFICANT CHANGE UP (ref 0–8)
GAS PNL BLDA: SIGNIFICANT CHANGE UP
GLUCOSE BLDC GLUCOMTR-MCNC: 153 MG/DL — HIGH (ref 70–99)
GLUCOSE BLDC GLUCOMTR-MCNC: 161 MG/DL — HIGH (ref 70–99)
GLUCOSE BLDC GLUCOMTR-MCNC: 186 MG/DL — HIGH (ref 70–99)
GLUCOSE BLDC GLUCOMTR-MCNC: 193 MG/DL — HIGH (ref 70–99)
GLUCOSE SERPL-MCNC: 154 MG/DL — HIGH (ref 70–99)
HCO3 BLDA-SCNC: 16 MMOL/L — LOW (ref 21–28)
HCT VFR BLD CALC: 22.2 % — LOW (ref 37–47)
HGB BLD-MCNC: 7.7 G/DL — LOW (ref 12–16)
HOROWITZ INDEX BLDA+IHG-RTO: 35 — SIGNIFICANT CHANGE UP
IMM GRANULOCYTES NFR BLD AUTO: 1 % — HIGH (ref 0.1–0.3)
LYMPHOCYTES # BLD AUTO: 0.43 K/UL — LOW (ref 1.2–3.4)
LYMPHOCYTES # BLD AUTO: 2.8 % — LOW (ref 20.5–51.1)
MAGNESIUM SERPL-MCNC: 2.8 MG/DL — HIGH (ref 1.8–2.4)
MCHC RBC-ENTMCNC: 30.7 PG — SIGNIFICANT CHANGE UP (ref 27–31)
MCHC RBC-ENTMCNC: 34.7 G/DL — SIGNIFICANT CHANGE UP (ref 32–37)
MCV RBC AUTO: 88.4 FL — SIGNIFICANT CHANGE UP (ref 81–99)
MONOCYTES # BLD AUTO: 0.6 K/UL — SIGNIFICANT CHANGE UP (ref 0.1–0.6)
MONOCYTES NFR BLD AUTO: 3.9 % — SIGNIFICANT CHANGE UP (ref 1.7–9.3)
NEUTROPHILS # BLD AUTO: 14.12 K/UL — HIGH (ref 1.4–6.5)
NEUTROPHILS NFR BLD AUTO: 92.2 % — HIGH (ref 42.2–75.2)
NRBC # BLD: 0 /100 WBCS — SIGNIFICANT CHANGE UP (ref 0–0)
PCO2 BLDA: 29 MMHG — LOW (ref 32–45)
PH BLDA: 7.34 — LOW (ref 7.35–7.45)
PHOSPHATE SERPL-MCNC: 6.1 MG/DL — HIGH (ref 2.1–4.9)
PLATELET # BLD AUTO: 292 K/UL — SIGNIFICANT CHANGE UP (ref 130–400)
PMV BLD: 10.5 FL — HIGH (ref 7.4–10.4)
PO2 BLDA: 186 MMHG — HIGH (ref 83–108)
POTASSIUM SERPL-MCNC: 5 MMOL/L — SIGNIFICANT CHANGE UP (ref 3.5–5)
POTASSIUM SERPL-SCNC: 5 MMOL/L — SIGNIFICANT CHANGE UP (ref 3.5–5)
PROT SERPL-MCNC: 5.1 G/DL — LOW (ref 6–8)
RBC # BLD: 2.51 M/UL — LOW (ref 4.2–5.4)
RBC # FLD: 15.9 % — HIGH (ref 11.5–14.5)
SAO2 % BLDA: 99.6 % — HIGH (ref 94–98)
SODIUM SERPL-SCNC: 143 MMOL/L — SIGNIFICANT CHANGE UP (ref 135–146)
WBC # BLD: 15.33 K/UL — HIGH (ref 4.8–10.8)
WBC # FLD AUTO: 15.33 K/UL — HIGH (ref 4.8–10.8)

## 2024-10-11 PROCEDURE — 95720 EEG PHY/QHP EA INCR W/VEEG: CPT

## 2024-10-11 PROCEDURE — 99291 CRITICAL CARE FIRST HOUR: CPT

## 2024-10-11 RX ORDER — CALCIUM ACETATE 667 MG/1
667 CAPSULE ORAL EVERY 8 HOURS
Refills: 0 | Status: DISCONTINUED | OUTPATIENT
Start: 2024-10-11 | End: 2024-10-13

## 2024-10-11 RX ORDER — SODIUM BICARBONATE 1 MEQ/ML
50 VIAL (ML) INTRAVENOUS ONCE
Refills: 0 | Status: COMPLETED | OUTPATIENT
Start: 2024-10-11 | End: 2024-10-11

## 2024-10-11 RX ORDER — SODIUM BICARBONATE 1 MEQ/ML
650 VIAL (ML) INTRAVENOUS EVERY 12 HOURS
Refills: 0 | Status: DISCONTINUED | OUTPATIENT
Start: 2024-10-11 | End: 2024-10-12

## 2024-10-11 RX ADMIN — Medication 50 MILLIEQUIVALENT(S): at 11:52

## 2024-10-11 RX ADMIN — LEVETIRACETAM 400 MILLIGRAM(S): 500 TABLET, FILM COATED ORAL at 17:18

## 2024-10-11 RX ADMIN — Medication 650 MILLIGRAM(S): at 17:18

## 2024-10-11 RX ADMIN — POLYETHYLENE GLYCOL 3350 17 GRAM(S): 17 POWDER, FOR SOLUTION ORAL at 11:53

## 2024-10-11 RX ADMIN — Medication 2 TABLET(S): at 21:02

## 2024-10-11 RX ADMIN — HYDROCORTISONE 40 MILLIGRAM(S): 10 TABLET ORAL at 15:32

## 2024-10-11 RX ADMIN — Medication 2: at 18:34

## 2024-10-11 RX ADMIN — Medication 25 MICROGRAM(S): at 19:27

## 2024-10-11 RX ADMIN — CALCIUM ACETATE 667 MILLIGRAM(S): 667 CAPSULE ORAL at 15:33

## 2024-10-11 RX ADMIN — FAMOTIDINE 20 MILLIGRAM(S): 10 INJECTION INTRAVENOUS at 11:52

## 2024-10-11 RX ADMIN — CALCIUM ACETATE 667 MILLIGRAM(S): 667 CAPSULE ORAL at 21:02

## 2024-10-11 RX ADMIN — Medication 2: at 05:01

## 2024-10-11 RX ADMIN — Medication 25 MICROGRAM(S): at 20:00

## 2024-10-11 RX ADMIN — Medication 2: at 11:27

## 2024-10-11 RX ADMIN — CHLORHEXIDINE GLUCONATE 1 APPLICATION(S): 40 SOLUTION TOPICAL at 05:00

## 2024-10-11 RX ADMIN — Medication 2: at 23:07

## 2024-10-11 RX ADMIN — HYDROCORTISONE 40 MILLIGRAM(S): 10 TABLET ORAL at 05:01

## 2024-10-11 RX ADMIN — CHLORHEXIDINE GLUCONATE 15 MILLILITER(S): 40 SOLUTION TOPICAL at 05:01

## 2024-10-11 RX ADMIN — CHLORHEXIDINE GLUCONATE 15 MILLILITER(S): 40 SOLUTION TOPICAL at 17:18

## 2024-10-11 NOTE — PROGRESS NOTE ADULT - SUBJECTIVE AND OBJECTIVE BOX
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 Per pt's daughter, pt was LKW yesterday evening. Daughter states this morning pt was not answering the phone and did not open the door for her home health aide. Daughter came to home and found her on the floor, pt was conscious and told daughter "I fell". On initial arrival to ED pt only EO to pain, not following commands, WD extremities. Underwent CTH showing R hemispheric SDH 2.2cm w/ mass effect and 1.1cm MLS. On reassessment in ED pt opening eyes to voice, answering some questions and following simple commands. SBP after CT in 200s, started on Nicardipine gtt. Received 1g IV Keppra, 1850mL LR bolus, and was started on insulin gtt for BP in 400s. Pt assessed by NSGY, going to OR for evacuation. Admitted to NSICU for further management.    On admission   GCS 15       POD #3- s/p craniotomy and hematoma evacuation with improving MLS c/b by postop mild R SD recollection               S/P 4 U PRBC / i FFP/i PLT               Intubated   POD #2- S/P MMA      VITALS: [x] Reviewed    ICU Vital Signs Last 24 Hrs  T(C): 36.2 (11 Oct 2024 08:00), Max: 37.1 (11 Oct 2024 00:00)  T(F): 97.1 (11 Oct 2024 08:00), Max: 98.7 (11 Oct 2024 00:00)  HR: 74 (11 Oct 2024 08:14) (57 - 127)  ABP: 129/48 (11 Oct 2024 08:00) (92/43 - 149/53)  ABP(mean): 79 (11 Oct 2024 08:00) (55 - 93)  RR: 19 (11 Oct 2024 08:00) (1 - 66)  SpO2: 100% (11 Oct 2024 08:14) (99% - 100%)    O2 Parameters below as of 11 Oct 2024 08:00  Patient On (Oxygen Delivery Method): ventilator    O2 Concentration (%): 40      Mode: AC/ CMV (Assist Control/ Continuous Mandatory Ventilation), RR (machine): 16, TV (machine): 400, FiO2: 40, PEEP: 5, PS: 5, ITime: 1, MAP: 8, PIP: 17  ABG - ( 11 Oct 2024 03:31 )  pH, Arterial: 7.34  pH, Blood: x     /  pCO2: 29    /  pO2: 186   / HCO3: 16    / Base Excess: -8.8  /  SaO2: 99.6            10 Oct 2024 07:01  -  11 Oct 2024 07:00  --------------------------------------------------------  IN:    Dexmedetomidine: 330.4 mL    IV PiggyBack: 350 mL    NiCARdipine: 1262.5 mL  Total IN: 1942.9 mL    OUT:    Indwelling Catheter - Urethral (mL): 760 mL  Total OUT: 760 mL    Total NET: 1182.9 mL      11 Oct 2024 07:01  -  11 Oct 2024 08:27  --------------------------------------------------------  IN:    Dexmedetomidine: 12.9 mL    NiCARdipine: 50 mL  Total IN: 62.9 mL    OUT:  Total OUT: 0 mL    Total NET: 62.9 mL    MEDICATIONS  (STANDING):  chlorhexidine 0.12% Liquid 15 milliLiter(s) Oral Mucosa every 12 hours  chlorhexidine 2% Cloths 1 Application(s) Topical <User Schedule>  dexMEDEtomidine Infusion 0.2 MICROgram(s)/kG/Hr (3 mL/Hr) IV Continuous <Continuous>  dextrose 50% Injectable 50 milliLiter(s) IV Push every 15 minutes  dextrose 50% Injectable 25 milliLiter(s) IV Push every 15 minutes  dextrose 50% Injectable 25 milliLiter(s) IV Push every 15 minutes  famotidine    Tablet 20 milliGRAM(s) Oral daily  hydrocortisone sodium succinate Injectable 40 milliGRAM(s) IV Push every 8 hours  insulin lispro (ADMELOG) corrective regimen sliding scale   SubCutaneous every 6 hours  levETIRAcetam  IVPB 750 milliGRAM(s) IV Intermittent every 12 hours  niCARdipine Infusion 5 mG/Hr (25 mL/Hr) IV Continuous <Continuous>  polyethylene glycol 3350 17 Gram(s) Oral daily  senna 2 Tablet(s) Oral at bedtime    MEDICATIONS  (PRN):  fentaNYL    Injectable 25 MICROGram(s) IV Push every 4 hours PRN CPOT 4-6      CAPILLARY BLOOD GLUCOSE      POCT Blood Glucose.: 161 mg/dL (11 Oct 2024 04:08)  POCT Blood Glucose.: 193 mg/dL (10 Oct 2024 23:01)  POCT Blood Glucose.: 279 mg/dL (10 Oct 2024 17:20)  POCT Blood Glucose.: 220 mg/dL (10 Oct 2024 12:02)    10-11    143  |  119[H]  |  49[H]  ----------------------------<  154[H]  AG - 8  5.0   |  17  |  1.6[H]    Ca    8.7      11 Oct 2024 04:30  Phos  6.1     10-11  Mg     2.8     10-11    TPro  5.1[L]  /  Alb  3.5  /  TBili  0.6  /  DBili  x   /  AST  21  /  ALT  24  /  AlkPhos  86  10-11                          7.7    15.33 )-----------( 292      ( 11 Oct 2024 04:30 )             22.2                                 8.5    23.20 )-----------( 307      ( 10 Oct 2024 04:53 )             24.2     HGBA1C- 6.0  Procalcitonin- 7.0    CXR - Clear      CT Head No Cont (10.10.24 @ 06:08) >    IMPRESSION:    Stable right larger than left cerebral convexity subdural hemorrhage with   leftward midline shift measuring up to 0.5 cm compared to the prior CT   head from 10/9/2024    Stable right sided craniotomy with subdural drain in place. Slightly   increased overlying scalp swelling.          EEG- 10/9- 10/10   DESCRIPTION OF RECORDING:   The background consists of generalized polymorphic synchronous delta which is generally medium in amplitude.     No superimposed faster frequencies are present.     No normal features of wakefulness, drowsiness or sleep are present.     Hyperventilation was not performed.     Photic stimulation was not performed.     SPECIAL FINDINGS:   The recording shows triphasic waves.    EEG CLASSIFICATION:   Findings: Abnormal.     Generalized background slowing polymorphic delta.     Special findings: Triphasic waves.    INTERPRETATION:   This is an abnormal EEG consistent with severe diffuse cerebral dysfunction.       EXAMINATION:  General: WN/WD, no acute distress  HENT: NC/AT, moist oral mucosa, orally intubated  Eyes: Anicteric sclerae  Cardiac: [id number]  Lungs: Clear  Abdomen: Soft, non-tender, +BS  Extremities: No c/c/e  Skin/Incision Site: Clean, dry and intact  Neurologic: precedex/prop gtt on board, partially sedated, arousable to tactile, midline gaze not tracking, no command following, perrl, symmetric face, spont on all four and purposeful                         SUMMARY: 79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 Per pt's daughter, pt was LKW - 10/7/24  evening. Daughter states this morning pt was not answering the phone and did not open the door for her home health aide. Daughter came to home and found her on the floor, pt was conscious and told daughter "I fell". On initial arrival to ED pt only EO to pain, not following commands, WD extremities. Underwent CTH showing R hemispheric SDH 2.2cm w/ mass effect and 1.1cm MLS. On reassessment in ED pt opening eyes to voice, answering some questions and following simple commands. SBP after CT in 200s, started on Nicardipine gtt. Received 1g IV Keppra, 1850mL LR bolus, and was started on insulin gtt for BP in 400s. Pt assessed by NSGY, going to OR for evacuation. Admitted to NSICU for further management.    Overnight - agitation     On admission   GCS 15       POD #3- s/p craniotomy and hematoma evacuation with improving MLS c/b by postop mild R SD recollection               S/P 4 U PRBC / i FFP/i PLT               Intubated   POD #2- S/P MMA      VITALS: [x] Reviewed    ICU Vital Signs Last 24 Hrs  T(C): 36.2 (11 Oct 2024 08:00), Max: 37.1 (11 Oct 2024 00:00)  T(F): 97.1 (11 Oct 2024 08:00), Max: 98.7 (11 Oct 2024 00:00)  HR: 74 (11 Oct 2024 08:14) (57 - 127)  ABP: 129/48 (11 Oct 2024 08:00) (92/43 - 149/53)  ABP(mean): 79 (11 Oct 2024 08:00) (55 - 93)  RR: 19 (11 Oct 2024 08:00) (19- 26)  SpO2: 100% (11 Oct 2024 08:14) (99% - 100%)    O2 Parameters below as of 11 Oct 2024 08:00  Patient On (Oxygen Delivery Method): ventilator    O2 Concentration (%): 40      Mode: AC/ CMV (Assist Control/ Continuous Mandatory Ventilation), RR (machine): 16, TV (machine): 400, FiO2: 40, PEEP: 5, PS: 5, ITime: 1, MAP: 8, PIP: 17  ABG - ( 11 Oct 2024 03:31 )  pH, Arterial: 7.34  pH, Blood: x     /  pCO2: 29    /  pO2: 186   / HCO3: 16    / Base Excess: -8.8  /  SaO2: 99.6- Metabolic acidosis             10 Oct 2024 07:01  -  11 Oct 2024 07:00  --------------------------------------------------------  IN:    Dexmedetomidine: 330.4 mL    IV PiggyBack: 350 mL    NiCARdipine: 1262.5 mL  Total IN: 1942.9 mL    OUT:    Indwelling Catheter - Urethral (mL): 760 mL  Total OUT: 760 mL    Total NET: 1182.9 mL      11 Oct 2024 07:01  -  11 Oct 2024 08:27  --------------------------------------------------------  IN:    Dexmedetomidine: 12.9 mL    NiCARdipine: 50 mL  Total IN: 62.9 mL    OUT:  Total OUT: 0 mL    Total NET: 62.9 mL    MEDICATIONS  (STANDING):  chlorhexidine 0.12% Liquid 15 milliLiter(s) Oral Mucosa every 12 hours  chlorhexidine 2% Cloths 1 Application(s) Topical <User Schedule>  dexMEDEtomidine Infusion 0.2 MICROgram(s)/kG/Hr (3 mL/Hr) IV Continuous <Continuous>  dextrose 50% Injectable 50 milliLiter(s) IV Push every 15 minutes  dextrose 50% Injectable 25 milliLiter(s) IV Push every 15 minutes  dextrose 50% Injectable 25 milliLiter(s) IV Push every 15 minutes  famotidine    Tablet 20 milliGRAM(s) Oral daily  hydrocortisone sodium succinate Injectable 40 milliGRAM(s) IV Push every 8 hours  insulin lispro (ADMELOG) corrective regimen sliding scale   SubCutaneous every 6 hours  levETIRAcetam  IVPB 750 milliGRAM(s) IV Intermittent every 12 hours  niCARdipine Infusion 5 mG/Hr (25 mL/Hr) IV Continuous <Continuous>  polyethylene glycol 3350 17 Gram(s) Oral daily  senna 2 Tablet(s) Oral at bedtime    MEDICATIONS  (PRN):  fentaNYL    Injectable 25 MICROGram(s) IV Push every 4 hours PRN CPOT 4-6      CAPILLARY BLOOD GLUCOSE      POCT Blood Glucose.: 161 mg/dL (11 Oct 2024 04:08)  POCT Blood Glucose.: 193 mg/dL (10 Oct 2024 23:01)  POCT Blood Glucose.: 279 mg/dL (10 Oct 2024 17:20)  POCT Blood Glucose.: 220 mg/dL (10 Oct 2024 12:02)    10-11    143  |  119[H]  |  49[H]  ----------------------------<  154[H]  AG - 8  5.0   |  17  |  1.6[H]  CrCl - 29     Ca    8.7      11 Oct 2024 04:30  Phos  6.1     10-11  Mg     2.8     10-11    TPro  5.1[L]  /  Alb  3.5  /  TBili  0.6  /  DBili  x   /  AST  21  /  ALT  24  /  AlkPhos  86  10-11                          7.7    15.33 )-----------( 292      ( 11 Oct 2024 04:30 )             22.2                                 8.5    23.20 )-----------( 307      ( 10 Oct 2024 04:53 )             24.2     HGBA1C- 6.0  Procalcitonin- 7.0    CXR - Clear      CT Head No Cont (10.10.24 @ 06:08) >    IMPRESSION:    Stable right larger than left cerebral convexity subdural hemorrhage with   leftward midline shift measuring up to 0.5 cm compared to the prior CT   head from 10/9/2024    Stable right sided craniotomy with subdural drain in place. Slightly   increased overlying scalp swelling.          EEG- 10/10- 10/11    EEG Report:  Reason for Test: evaluation of mental status change.    DESCRIPTION OF RECORDING:   The background consists of generalized polymorphic synchronous delta which is generally medium in amplitude.     No normal features of wakefulness, drowsiness or sleep are present.    SPECIAL FINDINGS:   The recording shows triphasic waves. at times triphasic waves become rhythmic with higher bifrontal amplitude.    EEG CLASSIFICATION:   Findings: Abnormal.     Generalized background slowing polymorphic delta.     Special findings: Triphasic waves.    INTERPRETATION:   This is an abnormal EEG consistent with moderate diffuse cerebral dysfunction.         EXAMINATION:  General: WN/WD, no acute distress  HENT: NC/AT, moist oral mucosa, orally intubated  Eyes: Anicteric sclerae  Cardiac: [id number]  Lungs: Clear  Abdomen: Soft, non-tender, +BS  Extremities: No c/c/e  Skin/Incision Site: Clean, dry and intact  Neurologic: precedex/on board, partially sedated, arousable tto voice ,  tracking, no command following, perrl, symmetric face, spont moves antigravity R > L antigravity

## 2024-10-11 NOTE — PROGRESS NOTE ADULT - ASSESSMENT
ASSESSMENT/PLAN:  79F w/ PMHx of MDS, DM, HTN, CKD presents after fall at home, found to have R SDH.   POD #2/ # 1 s/p crani and hematoma evacuation and MMA embolization     NEURO:  q1h neurochecks  repeat cth today- stable - R Holo acute SDH - 4.5 R---> L shift   NSGY following  analgo-sedation- precedex/fentanyl prn   Activity: [X] Bed rest     PULM: Resp failure secondary to neuro injury   - Weaning parameterr  - Weaning Precedex- RASS  0 to -1  - Solumedrol 40mg IV q8 x24 hrs   - CPAP 5/5   lung protective vent support   PSV trial as tolerates  HOB>30  Aspiration precautions    CV:  -130  Nicardipine gtt  Takes Lisinopril (held for hyperK), Nifedipine at home  echo     RENAL: ?CKD- stable / Hyperphostemia  - Will monitor electrolytes   Monitor I&Os, ocampo     GI:  NPO for possible extubation   GI prophylaxis [] not indicated [] PPI [x] pepcid   Bowel regimen [x] miralax [x] senna [] other:    ENDO:  Type 2 DM   Goal -180  - Monitor FS - HISS   Normal B-hydroxybutyrate- 0.4   Hold glimepiride, jardiance, pioglitazone  a1c  TSH     HEME/ONC:  MDS   VTE prophylaxis: [x] SCDs [ [x] hold chemoprophylaxis due to: SDH await 48 hrs Stable CT   venous dopplers b/l LEs- neg - 10/9/24   MDS hx (follows with Dr Wade)- s/p 4:1:1 of red/ffp/plt intraoperatively  inr normal on 10/8 in pm    ID:  Symone-op antibiotics  UA negative  F/u blood cx    MISC:    SOCIAL/FAMILY:  [x] updated at bedside [] family meeting    CODE STATUS:  [x] Full Code     DISPOSITION:  [x] ICU  ASSESSMENT/PLAN:  79F w/ PMHx of MDS, DM, HTN, CKD presents after fall at home, found to have R SDH.   POD #2/ # 1 s/p crani and hematoma evacuation and MMA embolization     NEURO:  q2h neurochecks  repeat cth 10/10/24- stable - R Holo acute SDH - 4.5 R---> L shift   NSGY following  analgo-sedation- precedex/fentanyl prn   Activity: [X] Bed rest     PULM: Resp failure secondary to neuro injury   - Weaning parameterr  - Weaning Precedex- RASS  0 to -1  - Solumedrol 40mg IV q8 x24 hrs   - CPAP 5/5   lung protective vent support   PSV trial as tolerates  HOB>30  Aspiration precautions    CV:  -130  Nicardipine gtt  Takes Lisinopril (held for hyperK), Nifedipine at home  echo    TTE Echo Complete w/o Contrast w/ Doppler (10.09.24 @ 12:41) >  Summary:   1. Technically limited study.   2. Normal global left ventricular systolic function.   3. Left ventricular ejection fraction, by visual estimation, is 65 to   70%.   4. The left ventricular diastolic function could not be assessed in this   study.   5. Normal right ventricular size and function.   6. Mildly enlarged left atrium.  7. Sclerotic aortic valve with normal opening.   8. Mild tricuspid regurgitation.   9. Normal pulmonary artery pressure.      RENAL: CKD- stable / Hyperphostemia/ Metabolic acidosis secondary to CKD   - Cr Cl - 29  - H2Co3 - 50 meq x1 then 650mg q12  - Will monitor electrolytes   - Premafit   - D/C ocampo     GI:  NPO for possible extubation   GI prophylaxis [] not indicated [] PPI [x] pepcid   Bowel regimen [x] miralax [x] senna [] other:    ENDO:  Prediabetic    Goal -180  - Monitor FS - HISS   Normal B-hydroxybutyrate- 0.4   Hold glimepiride, jardiance, pioglitazone  a1c- 6.0   TSH- 2.68     HEME/ONC:  MDS   VTE prophylaxis: [x] SCDs [ [x] hold chemoprophylaxis due to: SDH await 48 hrs Stable CT   venous dopplers b/l LEs- neg - 10/9/24   MDS hx (follows with Dr Wade)- s/p 4:1:1 of red/ffp/plt intraoperatively  inr normal on 10/8 in pm    ID:  Symone-op antibiotics  UA negative  F/u blood cx      SOCIAL/FAMILY:  [x] updated at bedside [] family meeting    CODE STATUS:  [x] Full Code     DISPOSITION:  [x] ICU

## 2024-10-11 NOTE — PROGRESS NOTE ADULT - SUBJECTIVE AND OBJECTIVE BOX
POD#3/2    S/P Right craniotomy for evac of SDH 10.8.24  S/P MMA Embolization 10.9.24    Pt seen and examined at bedside this am. Pt remains intubated, sedated with Precidex    Vital Signs Last 24 Hrs  T(C): 36.2 (11 Oct 2024 08:00), Max: 37.1 (11 Oct 2024 00:00)  T(F): 97.1 (11 Oct 2024 08:00), Max: 98.7 (11 Oct 2024 00:00)  HR: 99 (11 Oct 2024 14:00) (57 - 127)  BP: --  BP(mean): --  RR: 13 (11 Oct 2024 13:00) (1 - 66)  SpO2: 100% (11 Oct 2024 14:00) (99% - 100%)    Parameters below as of 11 Oct 2024 08:00  Patient On (Oxygen Delivery Method): ventilator    O2 Concentration (%): 40    I&O's Detail    10 Oct 2024 07:01  -  11 Oct 2024 07:00  --------------------------------------------------------  IN:    Dexmedetomidine: 330.4 mL    IV PiggyBack: 350 mL    NiCARdipine: 1262.5 mL  Total IN: 1942.9 mL    OUT:    Indwelling Catheter - Urethral (mL): 760 mL  Total OUT: 760 mL    Total NET: 1182.9 mL      11 Oct 2024 07:01  -  11 Oct 2024 14:21  --------------------------------------------------------  IN:    Dexmedetomidine: 65.6 mL    NiCARdipine: 275 mL  Total IN: 340.6 mL    OUT:    Indwelling Catheter - Urethral (mL): 130 mL  Total OUT: 130 mL    Total NET: 210.6 mL        I&O's Summary    10 Oct 2024 07:01  -  11 Oct 2024 07:00  --------------------------------------------------------  IN: 1942.9 mL / OUT: 760 mL / NET: 1182.9 mL    11 Oct 2024 07:01  -  11 Oct 2024 14:21  --------------------------------------------------------  IN: 340.6 mL / OUT: 130 mL / NET: 210.6 mL        REVIEW OF SYSTEMS    [ ] A ten-point review of systems was otherwise negative except as noted.  [X] Due to altered mental status/intubation, subjective information were not able to be obtained from the patient. History was obtained, to the extent possible, from review of the chart and collateral sources of information.      PHYSICAL EXAM:  Neurological:    Opens eyes to verbal  No commands  Pupils reactive  No Tracking  Grimaces to pain  w/d all ext to pain  Moves extremities spontaneously  Incision intact    LABS:                        7.7    15.33 )-----------( 292      ( 11 Oct 2024 04:30 )             22.2     10-11    143  |  119[H]  |  49[H]  ----------------------------<  154[H]  5.0   |  17  |  1.6[H]    Ca    8.7      11 Oct 2024 04:30  Phos  6.1     10-11  Mg     2.8     10-11    TPro  5.1[L]  /  Alb  3.5  /  TBili  0.6  /  DBili  x   /  AST  21  /  ALT  24  /  AlkPhos  86  10-11      Urinalysis Basic - ( 11 Oct 2024 04:30 )    Color: x / Appearance: x / SG: x / pH: x  Gluc: 154 mg/dL / Ketone: x  / Bili: x / Urobili: x   Blood: x / Protein: x / Nitrite: x   Leuk Esterase: x / RBC: x / WBC x   Sq Epi: x / Non Sq Epi: x / Bacteria: x          CAPILLARY BLOOD GLUCOSE      POCT Blood Glucose.: 186 mg/dL (11 Oct 2024 12:36)  POCT Blood Glucose.: 161 mg/dL (11 Oct 2024 04:08)  POCT Blood Glucose.: 193 mg/dL (10 Oct 2024 23:01)  POCT Blood Glucose.: 279 mg/dL (10 Oct 2024 17:20)    Allergies    No Known Allergies    Intolerances      MEDICATIONS:  Antibiotics:    Neuro:  dexMEDEtomidine Infusion 0.2 MICROgram(s)/kG/Hr IV Continuous <Continuous>  fentaNYL    Injectable 25 MICROGram(s) IV Push every 4 hours PRN  levETIRAcetam  IVPB 750 milliGRAM(s) IV Intermittent every 12 hours      IVF:  calcium acetate 667 milliGRAM(s) Oral every 8 hours  sodium bicarbonate 650 milliGRAM(s) Oral every 12 hours      CULTURES:  Culture Results:   No growth at 48 Hours (10-08 @ 11:14)  Culture Results:   No growth at 48 Hours (10-08 @ 11:14)      RADIOLOGY & ADDITIONAL TESTS:      ASSESSMENT:  79y Female s/p    Traumatic subdural hemorrhage without loss of consciousness, initial encounter    NOMD    NOMD    NOMD    NOMD    NOMD    Abnormal finding of blood chemistry, unspecified    Acute kidney failure, unspecified    Anemia, unspecified    Chronic cough    Chronic kidney disease, unspecified    Cough, unspecified    Cutaneous abscess of back [any part, except buttock]    Cutaneous abscess of limb, unspecified    Deficiency of other specified B group vitamins    Diarrhea, unspecified    Encounter for antineoplastic chemotherapy    Essential (primary) hypertension    FALL (ON) (FROM) UNSPECIFIED STAIRS AND STEPS, INIT ENCNTR    Fever, unspecified    History of falling    Hypomagnesemia    Long term (current) use of oral hypoglycemic drugs    Myelodysplastic syndrome, unspecified    Other drug-induced agranulocytosis    Other long term (current) drug therapy    Other specified disorders of nose and nasal sinuses    Pain in right knee    Pain in unspecified knee    Personal history of diseases of the blood and blood-forming organs and certain disorders involving the immune mechanism    Personal history of other endocrine, nutritional and metabolic disease    Personal history of other medical treatment    Personal history of other specified conditions    Pneumonia, unspecified organism    Shortness of breath    Synovial cyst of popliteal space [Rios], right knee    Thrombocytopenia, unspecified    Thrombocytosis, unspecified    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 disease    Unspecified abdominal pain    Unspecified fall, initial encounter    Unspecified place or not applicable    Chronic kidney disease, stage 3b    Disorder of kidney and ureter, unspecified    Irritable bowel syndrome with diarrhea    No pertinent family history in first degree relatives    No pertinent family history in first degree relatives    No pertinent family history in first degree relatives    Handoff    MEWS Score    DM (diabetes mellitus)    MDS (myelodysplastic syndrome)    Traumatic subdural hemorrhage with brief coma    Craniotomy, emergent, for subdural hematoma evacuation    Embolization of cerebral vessel    No significant past surgical history    H/O colonoscopy    FALL HIT HEAD    90+    [email or web address]        PLAN:  Continue Neuro checks   Extubate when ready  Care as per Neuro Critical Care team  As per Dr. Cornell, [place] for DVT prophylaxis with [id number] only POD#3/2    S/P Right craniotomy for evac of SDH 10.8.24  S/P MMA Embolization 10.9.24    Pt seen and examined at bedside this am. Pt remains intubated, sedated with Precidex    Vital Signs Last 24 Hrs  T(C): 36.2 (11 Oct 2024 08:00), Max: 37.1 (11 Oct 2024 00:00)  T(F): 97.1 (11 Oct 2024 08:00), Max: 98.7 (11 Oct 2024 00:00)  HR: 99 (11 Oct 2024 14:00) (57 - 127)  BP: --  BP(mean): --  RR: 13 (11 Oct 2024 13:00) (1 - 66)  SpO2: 100% (11 Oct 2024 14:00) (99% - 100%)    Parameters below as of 11 Oct 2024 08:00  Patient On (Oxygen Delivery Method): ventilator    O2 Concentration (%): 40    I&O's Detail    10 Oct 2024 07:01  -  11 Oct 2024 07:00  --------------------------------------------------------  IN:    Dexmedetomidine: 330.4 mL    IV PiggyBack: 350 mL    NiCARdipine: 1262.5 mL  Total IN: 1942.9 mL    OUT:    Indwelling Catheter - Urethral (mL): 760 mL  Total OUT: 760 mL    Total NET: 1182.9 mL      11 Oct 2024 07:01  -  11 Oct 2024 14:21  --------------------------------------------------------  IN:    Dexmedetomidine: 65.6 mL    NiCARdipine: 275 mL  Total IN: 340.6 mL    OUT:    Indwelling Catheter - Urethral (mL): 130 mL  Total OUT: 130 mL    Total NET: 210.6 mL        I&O's Summary    10 Oct 2024 07:01  -  11 Oct 2024 07:00  --------------------------------------------------------  IN: 1942.9 mL / OUT: 760 mL / NET: 1182.9 mL    11 Oct 2024 07:01  -  11 Oct 2024 14:21  --------------------------------------------------------  IN: 340.6 mL / OUT: 130 mL / NET: 210.6 mL        REVIEW OF SYSTEMS    [ ] A ten-point review of systems was otherwise negative except as noted.  [X] Due to altered mental status/intubation, subjective information were not able to be obtained from the patient. History was obtained, to the extent possible, from review of the chart and collateral sources of information.      PHYSICAL EXAM:  Neurological:    Opens eyes to verbal  No commands  Pupils reactive  No Tracking  Grimaces to pain  w/d all ext to pain  Moves extremities spontaneously R>L  Incision intact    LABS:                        7.7    15.33 )-----------( 292      ( 11 Oct 2024 04:30 )             22.2     10-11    143  |  119[H]  |  49[H]  ----------------------------<  154[H]  5.0   |  17  |  1.6[H]    Ca    8.7      11 Oct 2024 04:30  Phos  6.1     10-11  Mg     2.8     10-11    TPro  5.1[L]  /  Alb  3.5  /  TBili  0.6  /  DBili  x   /  AST  21  /  ALT  24  /  AlkPhos  86  10-11      Urinalysis Basic - ( 11 Oct 2024 04:30 )    Color: x / Appearance: x / SG: x / pH: x  Gluc: 154 mg/dL / Ketone: x  / Bili: x / Urobili: x   Blood: x / Protein: x / Nitrite: x   Leuk Esterase: x / RBC: x / WBC x   Sq Epi: x / Non Sq Epi: x / Bacteria: x          CAPILLARY BLOOD GLUCOSE      POCT Blood Glucose.: 186 mg/dL (11 Oct 2024 12:36)  POCT Blood Glucose.: 161 mg/dL (11 Oct 2024 04:08)  POCT Blood Glucose.: 193 mg/dL (10 Oct 2024 23:01)  POCT Blood Glucose.: 279 mg/dL (10 Oct 2024 17:20)    Allergies    No Known Allergies    Intolerances      MEDICATIONS:  Antibiotics:    Neuro:  dexMEDEtomidine Infusion 0.2 MICROgram(s)/kG/Hr IV Continuous <Continuous>  fentaNYL    Injectable 25 MICROGram(s) IV Push every 4 hours PRN  levETIRAcetam  IVPB 750 milliGRAM(s) IV Intermittent every 12 hours      IVF:  calcium acetate 667 milliGRAM(s) Oral every 8 hours  sodium bicarbonate 650 milliGRAM(s) Oral every 12 hours      CULTURES:  Culture Results:   No growth at 48 Hours (10-08 @ 11:14)  Culture Results:   No growth at 48 Hours (10-08 @ 11:14)      RADIOLOGY & ADDITIONAL TESTS:      ASSESSMENT:  79y Female s/p    Traumatic subdural hemorrhage without loss of consciousness, initial encounter    NOMD    NOMD    NOMD    NOMD    NOMD    Abnormal finding of blood chemistry, unspecified    Acute kidney failure, unspecified    Anemia, unspecified    Chronic cough    Chronic kidney disease, unspecified    Cough, unspecified    Cutaneous abscess of back [any part, except buttock]    Cutaneous abscess of limb, unspecified    Deficiency of other specified B group vitamins    Diarrhea, unspecified    Encounter for antineoplastic chemotherapy    Essential (primary) hypertension    FALL (ON) (FROM) UNSPECIFIED STAIRS AND STEPS, INIT ENCNTR    Fever, unspecified    History of falling    Hypomagnesemia    Long term (current) use of oral hypoglycemic drugs    Myelodysplastic syndrome, unspecified    Other drug-induced agranulocytosis    Other long term (current) drug therapy    Other specified disorders of nose and nasal sinuses    Pain in right knee    Pain in unspecified knee    Personal history of diseases of the blood and blood-forming organs and certain disorders involving the immune mechanism    Personal history of other endocrine, nutritional and metabolic disease    Personal history of other medical treatment    Personal history of other specified conditions    Pneumonia, unspecified organism    Shortness of breath    Synovial cyst of popliteal space [Rios], right knee    Thrombocytopenia, unspecified    Thrombocytosis, unspecified    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 disease    Unspecified abdominal pain    Unspecified fall, initial encounter    Unspecified place or not applicable    Chronic kidney disease, stage 3b    Disorder of kidney and ureter, unspecified    Irritable bowel syndrome with diarrhea    No pertinent family history in first degree relatives    No pertinent family history in first degree relatives    No pertinent family history in first degree relatives    Handoff    MEWS Score    DM (diabetes mellitus)    MDS (myelodysplastic syndrome)    Traumatic subdural hemorrhage with brief coma    Craniotomy, emergent, for subdural hematoma evacuation    Embolization of cerebral vessel    No significant past surgical history    H/O colonoscopy    FALL HIT HEAD    90+    SysAdmin_VstLnk        PLAN:  Continue Neuro checks   Extubate when ready  Care as per Neuro Critical Care team  As per SCOTT Castrejon for DVT prophylaxis with [id number] only

## 2024-10-12 LAB
ALBUMIN SERPL ELPH-MCNC: 3.4 G/DL — LOW (ref 3.5–5.2)
ALP SERPL-CCNC: 85 U/L — SIGNIFICANT CHANGE UP (ref 30–115)
ALT FLD-CCNC: 14 U/L — SIGNIFICANT CHANGE UP (ref 0–41)
ANION GAP SERPL CALC-SCNC: 11 MMOL/L — SIGNIFICANT CHANGE UP (ref 7–14)
AST SERPL-CCNC: 12 U/L — SIGNIFICANT CHANGE UP (ref 0–41)
BASOPHILS # BLD AUTO: 0.01 K/UL — SIGNIFICANT CHANGE UP (ref 0–0.2)
BASOPHILS NFR BLD AUTO: 0.1 % — SIGNIFICANT CHANGE UP (ref 0–1)
BILIRUB SERPL-MCNC: 0.8 MG/DL — SIGNIFICANT CHANGE UP (ref 0.2–1.2)
BLD GP AB SCN SERPL QL: SIGNIFICANT CHANGE UP
BUN SERPL-MCNC: 56 MG/DL — HIGH (ref 10–20)
CALCIUM SERPL-MCNC: 8.8 MG/DL — SIGNIFICANT CHANGE UP (ref 8.4–10.5)
CHLORIDE SERPL-SCNC: 119 MMOL/L — HIGH (ref 98–110)
CO2 SERPL-SCNC: 17 MMOL/L — SIGNIFICANT CHANGE UP (ref 17–32)
CREAT SERPL-MCNC: 1.5 MG/DL — SIGNIFICANT CHANGE UP (ref 0.7–1.5)
EGFR: 35 ML/MIN/1.73M2 — LOW
EOSINOPHIL # BLD AUTO: 0 K/UL — SIGNIFICANT CHANGE UP (ref 0–0.7)
EOSINOPHIL NFR BLD AUTO: 0 % — SIGNIFICANT CHANGE UP (ref 0–8)
GAS PNL BLDA: SIGNIFICANT CHANGE UP
GLUCOSE BLDC GLUCOMTR-MCNC: 105 MG/DL — HIGH (ref 70–99)
GLUCOSE BLDC GLUCOMTR-MCNC: 182 MG/DL — HIGH (ref 70–99)
GLUCOSE BLDC GLUCOMTR-MCNC: 190 MG/DL — HIGH (ref 70–99)
GLUCOSE BLDC GLUCOMTR-MCNC: 67 MG/DL — LOW (ref 70–99)
GLUCOSE SERPL-MCNC: 168 MG/DL — HIGH (ref 70–99)
HCT VFR BLD CALC: 22 % — LOW (ref 37–47)
HGB BLD-MCNC: 7.3 G/DL — LOW (ref 12–16)
IMM GRANULOCYTES NFR BLD AUTO: 0.6 % — HIGH (ref 0.1–0.3)
LYMPHOCYTES # BLD AUTO: 0.47 K/UL — LOW (ref 1.2–3.4)
LYMPHOCYTES # BLD AUTO: 3.6 % — LOW (ref 20.5–51.1)
MAGNESIUM SERPL-MCNC: 2.6 MG/DL — HIGH (ref 1.8–2.4)
MCHC RBC-ENTMCNC: 30 PG — SIGNIFICANT CHANGE UP (ref 27–31)
MCHC RBC-ENTMCNC: 33.2 G/DL — SIGNIFICANT CHANGE UP (ref 32–37)
MCV RBC AUTO: 90.5 FL — SIGNIFICANT CHANGE UP (ref 81–99)
MONOCYTES # BLD AUTO: 0.92 K/UL — HIGH (ref 0.1–0.6)
MONOCYTES NFR BLD AUTO: 7.1 % — SIGNIFICANT CHANGE UP (ref 1.7–9.3)
NEUTROPHILS # BLD AUTO: 11.52 K/UL — HIGH (ref 1.4–6.5)
NEUTROPHILS NFR BLD AUTO: 88.6 % — HIGH (ref 42.2–75.2)
NRBC # BLD: 0 /100 WBCS — SIGNIFICANT CHANGE UP (ref 0–0)
PHOSPHATE SERPL-MCNC: 5.2 MG/DL — HIGH (ref 2.1–4.9)
PLATELET # BLD AUTO: 266 K/UL — SIGNIFICANT CHANGE UP (ref 130–400)
PMV BLD: 10.7 FL — HIGH (ref 7.4–10.4)
POTASSIUM SERPL-MCNC: 4.7 MMOL/L — SIGNIFICANT CHANGE UP (ref 3.5–5)
POTASSIUM SERPL-SCNC: 4.7 MMOL/L — SIGNIFICANT CHANGE UP (ref 3.5–5)
PROT SERPL-MCNC: 4.8 G/DL — LOW (ref 6–8)
RBC # BLD: 2.43 M/UL — LOW (ref 4.2–5.4)
RBC # FLD: 16.4 % — HIGH (ref 11.5–14.5)
SODIUM SERPL-SCNC: 147 MMOL/L — HIGH (ref 135–146)
WBC # BLD: 13 K/UL — HIGH (ref 4.8–10.8)
WBC # FLD AUTO: 13 K/UL — HIGH (ref 4.8–10.8)

## 2024-10-12 PROCEDURE — 71045 X-RAY EXAM CHEST 1 VIEW: CPT | Mod: 26

## 2024-10-12 PROCEDURE — 99291 CRITICAL CARE FIRST HOUR: CPT

## 2024-10-12 RX ORDER — AMLODIPINE BESYLATE 10 MG
10 TABLET ORAL DAILY
Refills: 0 | Status: DISCONTINUED | OUTPATIENT
Start: 2024-10-12 | End: 2024-10-12

## 2024-10-12 RX ORDER — LABETALOL HCL 200 MG
10 TABLET ORAL ONCE
Refills: 0 | Status: COMPLETED | OUTPATIENT
Start: 2024-10-12 | End: 2024-10-12

## 2024-10-12 RX ORDER — HEPARIN SODIUM 10000 [USP'U]/ML
5000 INJECTION INTRAVENOUS; SUBCUTANEOUS EVERY 12 HOURS
Refills: 0 | Status: DISCONTINUED | OUTPATIENT
Start: 2024-10-12 | End: 2024-10-16

## 2024-10-12 RX ORDER — FUROSEMIDE 40 MG
20 TABLET ORAL ONCE
Refills: 0 | Status: COMPLETED | OUTPATIENT
Start: 2024-10-12 | End: 2024-10-12

## 2024-10-12 RX ORDER — SODIUM BICARBONATE 1 MEQ/ML
650 VIAL (ML) INTRAVENOUS EVERY 8 HOURS
Refills: 0 | Status: DISCONTINUED | OUTPATIENT
Start: 2024-10-12 | End: 2024-10-14

## 2024-10-12 RX ORDER — METHYLPREDNISOLONE ACETATE 80 MG/ML
40 INJECTION, SUSPENSION INTRALESIONAL; INTRAMUSCULAR; INTRASYNOVIAL; SOFT TISSUE EVERY 12 HOURS
Refills: 0 | Status: DISCONTINUED | OUTPATIENT
Start: 2024-10-12 | End: 2024-10-13

## 2024-10-12 RX ORDER — FENTANYL CITRAT/DEXTROSE 5%/PF 1250MCG/50
50 PATIENT CONTROLLED ANALGESIA SYRINGE INTRAVENOUS EVERY 8 HOURS
Refills: 0 | Status: DISCONTINUED | OUTPATIENT
Start: 2024-10-12 | End: 2024-10-13

## 2024-10-12 RX ORDER — METHYLPREDNISOLONE ACETATE 80 MG/ML
40 INJECTION, SUSPENSION INTRALESIONAL; INTRAMUSCULAR; INTRASYNOVIAL; SOFT TISSUE EVERY 12 HOURS
Refills: 0 | Status: DISCONTINUED | OUTPATIENT
Start: 2024-10-12 | End: 2024-10-12

## 2024-10-12 RX ORDER — NICARDIPINE HCL 30 MG
5 CAPSULE, EXTENDED RELEASE ORAL
Qty: 40 | Refills: 0 | Status: ACTIVE | OUTPATIENT
Start: 2024-10-12 | End: 2025-09-10

## 2024-10-12 RX ORDER — SODIUM CHLORIDE 9 MG/ML
4 INJECTION, SOLUTION INTRAMUSCULAR; INTRAVENOUS; SUBCUTANEOUS EVERY 4 HOURS
Refills: 0 | Status: DISCONTINUED | OUTPATIENT
Start: 2024-10-12 | End: 2024-10-13

## 2024-10-12 RX ORDER — METHYLPREDNISOLONE ACETATE 80 MG/ML
40 INJECTION, SUSPENSION INTRALESIONAL; INTRAMUSCULAR; INTRASYNOVIAL; SOFT TISSUE ONCE
Refills: 0 | Status: COMPLETED | OUTPATIENT
Start: 2024-10-12 | End: 2024-10-12

## 2024-10-12 RX ORDER — AMLODIPINE BESYLATE 10 MG
10 TABLET ORAL DAILY
Refills: 0 | Status: DISCONTINUED | OUTPATIENT
Start: 2024-10-13 | End: 2024-10-27

## 2024-10-12 RX ORDER — ALBUTEROL 90 MCG
2.5 AEROSOL (GRAM) INHALATION EVERY 4 HOURS
Refills: 0 | Status: DISCONTINUED | OUTPATIENT
Start: 2024-10-12 | End: 2024-10-13

## 2024-10-12 RX ADMIN — Medication 2: at 11:50

## 2024-10-12 RX ADMIN — Medication 650 MILLIGRAM(S): at 05:09

## 2024-10-12 RX ADMIN — Medication 10 MILLIGRAM(S): at 14:13

## 2024-10-12 RX ADMIN — CALCIUM ACETATE 667 MILLIGRAM(S): 667 CAPSULE ORAL at 22:54

## 2024-10-12 RX ADMIN — METHYLPREDNISOLONE ACETATE 40 MILLIGRAM(S): 80 INJECTION, SUSPENSION INTRALESIONAL; INTRAMUSCULAR; INTRASYNOVIAL; SOFT TISSUE at 09:02

## 2024-10-12 RX ADMIN — HEPARIN SODIUM 5000 UNIT(S): 10000 INJECTION INTRAVENOUS; SUBCUTANEOUS at 18:12

## 2024-10-12 RX ADMIN — Medication 2: at 05:10

## 2024-10-12 RX ADMIN — LEVETIRACETAM 400 MILLIGRAM(S): 500 TABLET, FILM COATED ORAL at 18:15

## 2024-10-12 RX ADMIN — POLYETHYLENE GLYCOL 3350 17 GRAM(S): 17 POWDER, FOR SOLUTION ORAL at 11:45

## 2024-10-12 RX ADMIN — METHYLPREDNISOLONE ACETATE 40 MILLIGRAM(S): 80 INJECTION, SUSPENSION INTRALESIONAL; INTRAMUSCULAR; INTRASYNOVIAL; SOFT TISSUE at 22:54

## 2024-10-12 RX ADMIN — Medication 10 MILLIGRAM(S): at 18:00

## 2024-10-12 RX ADMIN — CHLORHEXIDINE GLUCONATE 1 APPLICATION(S): 40 SOLUTION TOPICAL at 05:10

## 2024-10-12 RX ADMIN — CALCIUM ACETATE 667 MILLIGRAM(S): 667 CAPSULE ORAL at 13:02

## 2024-10-12 RX ADMIN — Medication 2 TABLET(S): at 22:54

## 2024-10-12 RX ADMIN — CHLORHEXIDINE GLUCONATE 15 MILLILITER(S): 40 SOLUTION TOPICAL at 05:09

## 2024-10-12 RX ADMIN — LEVETIRACETAM 400 MILLIGRAM(S): 500 TABLET, FILM COATED ORAL at 05:09

## 2024-10-12 RX ADMIN — CHLORHEXIDINE GLUCONATE 15 MILLILITER(S): 40 SOLUTION TOPICAL at 18:13

## 2024-10-12 RX ADMIN — Medication 650 MILLIGRAM(S): at 22:54

## 2024-10-12 RX ADMIN — Medication 25 MILLILITER(S): at 23:51

## 2024-10-12 RX ADMIN — Medication 650 MILLIGRAM(S): at 14:13

## 2024-10-12 RX ADMIN — CALCIUM ACETATE 667 MILLIGRAM(S): 667 CAPSULE ORAL at 05:09

## 2024-10-12 RX ADMIN — Medication 20 MILLIGRAM(S): at 14:09

## 2024-10-12 RX ADMIN — FAMOTIDINE 20 MILLIGRAM(S): 10 INJECTION INTRAVENOUS at 11:45

## 2024-10-12 NOTE — PROGRESS NOTE ADULT - SUBJECTIVE AND OBJECTIVE BOX
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 Per pt's daughter, pt was LKW - 10/7/24  evening. Daughter states this morning pt was not answering the phone and did not open the door for her home health aide. Daughter came to home and found her on the floor, pt was conscious and told daughter "I fell". On initial arrival to ED pt only EO to pain, not following commands, WD extremities. Underwent CTH showing R hemispheric SDH 2.2cm w/ mass effect and 1.1cm MLS. On reassessment in ED pt opening eyes to voice, answering some questions and following simple commands. SBP after CT in 200s, started on Nicardipine gtt. Received 1g IV Keppra, 1850mL LR bolus, and was started on insulin gtt for BP in 400s. Pt assessed by NSGY, going to OR for evacuation. Admitted to NSICU for further management.    Overnight - agitation     On admission   GCS 15       POD #4- s/p craniotomy and hematoma evacuation with improving MLS c/b by postop mild R SD recollection               S/P 4 U PRBC / i FFP/i PLT               Intubated   POD #3- S/P MMA      VITALS: [x] Reviewed    ICU Vital Signs Last 24 Hrs  T(C): 35.9 (12 Oct 2024 04:00), Max: 36.6 (12 Oct 2024 00:00)  T(F): 96.7 (12 Oct 2024 04:00), Max: 97.9 (12 Oct 2024 00:00)  HR: 59 (12 Oct 2024 07:20) (57 - 115)  ABP: 120/41 (12 Oct 2024 07:00) (108/40 - 169/58)  ABP(mean): 69 (12 Oct 2024 07:00) (64 - 97)  RR: 0 (12 Oct 2024 06:45) (0 - 31)  SpO2: 100% (12 Oct 2024 07:20) (99% - 100%)    O2 Parameters below as of 12 Oct 2024 06:00  Patient On (Oxygen Delivery Method): ventilator    O2 Concentration (%): 40  Mode: AC/ CMV (Assist Control/ Continuous Mandatory Ventilation), RR (machine): 16, TV (machine): 400, FiO2: 40, PEEP: 5, PS: 5, ITime: 1, MAP: 8, PIP: 17  ABG - ( 12 Oct 2024 03:35 )  pH, Arterial: 7.33  pH, Blood: x     /  pCO2: 31    /  pO2: 197   / HCO3: 16    / Base Excess: -8.6  /  SaO2: 99.7          11 Oct 2024 07:01  -  12 Oct 2024 07:00  --------------------------------------------------------  IN:    Dexmedetomidine: 315.3 mL    IV PiggyBack: 100 mL    NiCARdipine: 1277 mL  Total IN: 1692.3 mL    OUT:    Indwelling Catheter - Urethral (mL): 130 mL    Intermittent Catheterization - Urethral (mL): 320 mL  Total OUT: 450 mL    Total NET: 1242.3 mL    MEDICATIONS  (STANDING):  albuterol    0.083% 2.5 milliGRAM(s) Nebulizer every 4 hours  calcium acetate 667 milliGRAM(s) Oral every 8 hours  chlorhexidine 0.12% Liquid 15 milliLiter(s) Oral Mucosa every 12 hours  chlorhexidine 2% Cloths 1 Application(s) Topical <User Schedule>  dexMEDEtomidine Infusion 0.2 MICROgram(s)/kG/Hr (3 mL/Hr) IV Continuous <Continuous>  dextrose 50% Injectable 25 milliLiter(s) IV Push every 15 minutes  dextrose 50% Injectable 50 milliLiter(s) IV Push every 15 minutes  dextrose 50% Injectable 25 milliLiter(s) IV Push every 15 minutes  famotidine    Tablet 20 milliGRAM(s) Oral daily  insulin lispro (ADMELOG) corrective regimen sliding scale   SubCutaneous every 6 hours  levETIRAcetam  IVPB 750 milliGRAM(s) IV Intermittent every 12 hours  methylPREDNISolone sodium succinate Injectable 40 milliGRAM(s) IV Push once  niCARdipine Infusion 5 mG/Hr (25 mL/Hr) IV Continuous <Continuous>  polyethylene glycol 3350 17 Gram(s) Oral daily  senna 2 Tablet(s) Oral at bedtime  sodium bicarbonate 650 milliGRAM(s) Oral every 12 hours  sodium chloride 3%  Inhalation 4 milliLiter(s) Inhalation every 4 hours    MEDICATIONS  (PRN):  fentaNYL    Injectable 25 MICROGram(s) IV Push every 4 hours PRN CPOT 4-6      CAPILLARY BLOOD GLUCOSE  POCT Blood Glucose.: 161 mg/dL (11 Oct 2024 04:08)  POCT Blood Glucose.: 193 mg/dL (10 Oct 2024 23:01)  POCT Blood Glucose.: 279 mg/dL (10 Oct 2024 17:20)  POCT Blood Glucose.: 220 mg/dL (10 Oct 2024 12:02)    10-12    147[H]  |  119[H]  |  56[H]  ----------------------------<  168[H]  4.7   |  17  |  1.5    Ca    8.8      12 Oct 2024 04:48  Phos  5.2     10-12  Mg     2.6     10-12    TPro  4.8[L]  /  Alb  3.4[L]  /  TBili  0.8  /  DBili  x   /  AST  12  /  ALT  14  /  AlkPhos  85  10-12                          7.3    13.00 )-----------( 266      ( 12 Oct 2024 04:48 )             22.0                             8.5    23.20 )-----------( 307      ( 10 Oct 2024 04:53 )             24.2     HGBA1C- 6.0  Procalcitonin- 7.0    CXR - Clear      CT Head No Cont (10.10.24 @ 06:08) >    IMPRESSION:    Stable right larger than left cerebral convexity subdural hemorrhage with   leftward midline shift measuring up to 0.5 cm compared to the prior CT   head from 10/9/2024    Stable right sided craniotomy with subdural drain in place. Slightly   increased overlying scalp swelling.          EEG- 10/10- 10/11    EEG Report:  Reason for Test: evaluation of mental status change.    DESCRIPTION OF RECORDING:   The background consists of generalized polymorphic synchronous delta which is generally medium in amplitude.     No normal features of wakefulness, drowsiness or sleep are present.    SPECIAL FINDINGS:   The recording shows triphasic waves. at times triphasic waves become rhythmic with higher bifrontal amplitude.    EEG CLASSIFICATION:   Findings: Abnormal.     Generalized background slowing polymorphic delta.     Special findings: Triphasic waves.    INTERPRETATION:   This is an abnormal EEG consistent with moderate diffuse cerebral dysfunction.         EXAMINATION:  General: WN/WD, no acute distress  HENT: NC/AT, moist oral mucosa, orally intubated  Eyes: Anicteric sclerae  Cardiac: [id number]  Lungs: Clear  Abdomen: Soft, non-tender, +BS  Extremities: No c/c/e  Skin/Incision Site: Clean, dry and intact  Neurologic: precedex/on board, partially sedated, arousable tto voice ,  tracking, no command following, perrl, symmetric face, spont moves antigravity R > L antigravity                          SUMMARY: 79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 Per pt's daughter, pt was LKW - 10/7/24  evening. Daughter states this morning pt was not answering the phone and did not open the door for her home health aide. Daughter came to home and found her on the floor, pt was conscious and told daughter "I fell". On initial arrival to ED pt only EO to pain, not following commands, WD extremities. Underwent CTH showing R hemispheric SDH 2.2cm w/ mass effect and 1.1cm MLS. On reassessment in ED pt opening eyes to voice, answering some questions and following simple commands. SBP after CT in 200s, started on Nicardipine gtt. Received 1g IV Keppra, 1850mL LR bolus, and was started on insulin gtt for BP in 400s. Pt assessed by NSGY, going to OR for evacuation. Admitted to NSICU for further management.    Overnight - agitation     On admission   GCS 15       POD #4- s/p craniotomy and hematoma evacuation with improving MLS c/b by postop mild R SD recollection               S/P 4 U PRBC / i FFP/i PLT               Intubated   POD #3- S/P MMA      VITALS: [x] Reviewed    ICU Vital Signs Last 24 Hrs  T(C): 35.9 (12 Oct 2024 04:00), Max: 36.6 (12 Oct 2024 00:00)  T(F): 96.7 (12 Oct 2024 04:00), Max: 97.9 (12 Oct 2024 00:00)  HR: 59 (12 Oct 2024 07:20) (57 - 115)  ABP: 120/41 (12 Oct 2024 07:00) (108/40 - 169/58)  ABP(mean): 69 (12 Oct 2024 07:00) (64 - 97)  SpO2: 100% (12 Oct 2024 07:20) (99% - 100%)    O2 Parameters below as of 12 Oct 2024 06:00  Patient On (Oxygen Delivery Method): ventilator    O2 Concentration (%): 40  Mode: AC/ CMV (Assist Control/ Continuous Mandatory Ventilation), RR (machine): 16, TV (machine): 400, FiO2: 40, PEEP: 5, PS: 5, ITime: 1, MAP: 8, PIP: 17  ABG - ( 12 Oct 2024 03:35 )  pH, Arterial: 7.33  pH, Blood: x     /  pCO2: 31    /  pO2: 197   / HCO3: 16    / Base Excess: -8.6  /  SaO2: 99.7          11 Oct 2024 07:01  -  12 Oct 2024 07:00  --------------------------------------------------------  IN:    Dexmedetomidine: 315.3 mL    IV PiggyBack: 100 mL    NiCARdipine: 1277 mL  Total IN: 1692.3 mL    OUT:    Indwelling Catheter - Urethral (mL): 130 mL    Intermittent Catheterization - Urethral (mL): 320 mL  Total OUT: 450 mL    Total NET: 1242.3 mL    MEDICATIONS  (STANDING):  albuterol    0.083% 2.5 milliGRAM(s) Nebulizer every 4 hours  calcium acetate 667 milliGRAM(s) Oral every 8 hours  chlorhexidine 0.12% Liquid 15 milliLiter(s) Oral Mucosa every 12 hours  chlorhexidine 2% Cloths 1 Application(s) Topical <User Schedule>  dexMEDEtomidine Infusion 0.5 MICROgram(s)/kG/Hr (3 mL/Hr) IV Continuous <Continuous>  dextrose 50% Injectable 25 milliLiter(s) IV Push every 15 minutes  dextrose 50% Injectable 50 milliLiter(s) IV Push every 15 minutes  dextrose 50% Injectable 25 milliLiter(s) IV Push every 15 minutes  famotidine    Tablet 20 milliGRAM(s) Oral daily  insulin lispro (ADMELOG) corrective regimen sliding scale   SubCutaneous every 6 hours  levETIRAcetam  IVPB 750 milliGRAM(s) IV Intermittent every 12 hours  methylPREDNISolone sodium succinate Injectable 40 milliGRAM(s) IV Push once  niCARdipine Infusion 5 mG/Hr (25 mL/Hr) IV Continuous <Continuous>  polyethylene glycol 3350 17 Gram(s) Oral daily  senna 2 Tablet(s) Oral at bedtime  sodium bicarbonate 650 milliGRAM(s) Oral every 12 hours  sodium chloride 3%  Inhalation 4 milliLiter(s) Inhalation every 4 hours    MEDICATIONS  (PRN):  fentaNYL    Injectable 25 MICROGram(s) IV Push every 4 hours PRN CPOT 4-6      CAPILLARY BLOOD GLUCOSE  POCT Blood Glucose.: 161 mg/dL (11 Oct 2024 04:08)  POCT Blood Glucose.: 193 mg/dL (10 Oct 2024 23:01)  POCT Blood Glucose.: 279 mg/dL (10 Oct 2024 17:20)  POCT Blood Glucose.: 220 mg/dL (10 Oct 2024 12:02)    10-12    147[H]  |  119[H]  |  56[H]  ----------------------------<  168[H]  4.7   |  17  |  1.5     (1.2)     Ca    8.8      12 Oct 2024 04:48  Phos  5.2     10-12  Mg     2.6     10-12    TPro  4.8[L]  /  Alb  3.4[L]  /  TBili  0.8  /  DBili  x   /  AST  12  /  ALT  14  /  AlkPhos  85  10-12                          7.3    13.00 )-----------( 266      ( 12 Oct 2024 04:48 )             22.0                             8.5    23.20 )-----------( 307      ( 10 Oct 2024 04:53 )             24.2     HGBA1C- 6.0      CXR - Clear      CT Head No Cont (10.10.24 @ 06:08) >    IMPRESSION:    Stable right larger than left cerebral convexity subdural hemorrhage with   leftward midline shift measuring up to 0.5 cm compared to the prior CT   head from 10/9/2024    Stable right sided craniotomy with subdural drain in place. Slightly   increased overlying scalp swelling.      EEG SUMMARY/CLASSIFICATION-EEG- 10/11- 10/12    Abnormal EEG in an encephalopathic patient.  -Triphasic waves, generalized  -Continuous slowing, generalized, moderate to severe  _____________________________________________________________  EEG IMPRESSION/CLINICAL CORRELATE    Abnormal EEG study.  1. Moderate to severe diffuse or multifocal cerebral dysfunction, which may have a metabolic component.   2. No epileptiform pattern or seizure seen.            EXAMINATION:  General: WN/WD, no acute distress  HENT: NC/AT, moist oral mucosa, orally intubated  Eyes: Anicteric sclerae  Cardiac: [id number]  Lungs: Clear  Abdomen: Soft, non-tender, +BS  Extremities: No c/c/e  Skin/Incision Site: Clean, dry and intact  Neurologic: precedex/on board, partially sedated, arousable tto voice ,  tracking, no command following, perrl, symmetric face, spont moves antigravity R > L antigravity

## 2024-10-12 NOTE — PROGRESS NOTE ADULT - ASSESSMENT
ASSESSMENT/PLAN:  79F w/ PMHx of MDS, DM, HTN, CKD presents after fall at home, found to have R SDH.   POD #2/ # 1 s/p crani and hematoma evacuation and MMA embolization     NEURO:  q2h neurochecks  repeat cth 10/10/24- stable - R Holo acute SDH - 4.5 R---> L shift   NSGY following  analgo-sedation- precedex/fentanyl prn   Activity: [X] Bed rest     PULM: Resp failure secondary to neuro injury   - Weaning parameters  - Check CXR today  - Weaning Precedex- RASS  0 to -1  - No cuff leak  - Solumedrol 40mg IV q12 x24 hrs   - CPAP 5/5   lung protective vent support   PSV trial as tolerates  HOB>30  Aspiration precautions    CV:  -130  Nicardipine gtt  Takes Lisinopril (held for hyperK), Nifedipine at home  echo    TTE Echo Complete w/o Contrast w/ Doppler (10.09.24 @ 12:41) >  Summary:   1. Technically limited study.   2. Normal global left ventricular systolic function.   3. Left ventricular ejection fraction, by visual estimation, is 65 to   70%.   4. The left ventricular diastolic function could not be assessed in this   study.   5. Normal right ventricular size and function.   6. Mildly enlarged left atrium.  7. Sclerotic aortic valve with normal opening.   8. Mild tricuspid regurgitation.   9. Normal pulmonary artery pressure.      RENAL: CKD- stable / Hyperphostemia/ Metabolic acidosis secondary to CKD   - Cr Cl - 29  - H2Co3  650mg q 8  - Will monitor electrolytes   - Premafit   - D/C ocampo     GI:  NPO for possible extubation   GI prophylaxis [] not indicated [] PPI [x] pepcid   Bowel regimen [x] miralax [x] senna [] other:    ENDO:  Prediabetic    Goal -180  - Monitor FS - HISS   Normal B-hydroxybutyrate- 0.4   Hold glimepiride, jardiance, pioglitazone  a1c- 6.0   TSH- 2.68     HEME/ONC:  MDS   VTE prophylaxis: [x] SCDs [ [x] hold chemoprophylaxis due to: SDH await 48 hrs Stable CT   venous dopplers b/l LEs- neg - 10/9/24   MDS hx (follows with Dr Wade)- s/p 4:1:1 of red/ffp/plt intraoperatively  inr normal on 10/8 in pm    ID:  Symone-op antibiotics  UA negative  F/u blood cx      SOCIAL/FAMILY:  [x] updated at bedside [] family meeting    CODE STATUS:  [x] Full Code     DISPOSITION:  [x] ICU  ASSESSMENT/PLAN:  79F w/ PMHx of MDS, DM, HTN, CKD presents after fall at home, found to have R SDH.   POD #4/ # 3 s/p crani and hematoma evacuation and MMA embolization     NEURO:  q4h neurochecks  repeat cth 10/10/24- stable - R Holo acute SDH - 4.5 R---> L shift   NSGY following  analgo-sedation- precedex/fentanyl prn   Activity: [X] Bed rest     PULM: Resp failure secondary to neuro injury   - Weaning parameters  - SAT / SBT   - CXR - small L pleural effusion   - Weaning Precedex- RASS  0 to -1  - No cuff leak  - Solumedrol 40mg IV q12 x24 hrs   - CPAP 5/5   lung protective vent support   PSV trial as tolerates  HOB>30  Aspiration precautions    CV:  -130  Nicardipine weaning to off  Takes Lisinopril (held for hyperK 4.7- 5.0 )),   Amlodipine 10mg q day   echo    TTE Echo Complete w/o Contrast w/ Doppler (10.09.24 @ 12:41) >  Summary:   1. Technically limited study.   2. Normal global left ventricular systolic function.   3. Left ventricular ejection fraction, by visual estimation, is 65 to   70%.   4. The left ventricular diastolic function could not be assessed in this   study.   5. Normal right ventricular size and function.   6. Mildly enlarged left atrium.  7. Sclerotic aortic valve with normal opening.   8. Mild tricuspid regurgitation.   9. Normal pulmonary artery pressure.      RENAL: CKD- stable / Hyperphostemia/ Metabolic acidosis secondary to CKD   - Cr Cl - 29  - H2Co3  650mg q 8  - Will monitor electrolytes   - Lasix 20mg IV X1 place ocampo X 24 hrs then D/C - adequate I/O     GI:  NPO for possible extubation   GI prophylaxis [] not indicated [] PPI [x] pepcid   Bowel regimen [x] miralax [x] senna [] other:    ENDO:  Prediabetic    Goal -180  - Monitor FS - HISS   Normal B-hydroxybutyrate- 0.4   Hold glimepiride, jardiance, pioglitazone  a1c- 6.0   TSH- 2.68     HEME/ONC:  MDS   VTE prophylaxis: [x] SCDs [ [x] heparin 5 K q 12 SQ   venous dopplers b/l LEs- neg - 10/9/24   MDS hx (follows with Dr Wade)- - On revlimid and Vidaza as OPT previously NOW on Lusaracept.     ID:  Cultures neg to date       SOCIAL/FAMILY:  [x] updated at bedside     CODE STATUS:  [x] Full Code     DISPOSITION:  [x] ICU  ASSESSMENT/PLAN:  79F w/ PMHx of MDS, DM, HTN, CKD presents after fall at home, found to have R SDH.   POD #4/ # 3 s/p crani and hematoma evacuation and MMA embolization     NEURO:  q4h neurochecks  repeat cth 10/10/24- stable - R Holo acute SDH - 4.5 R---> L shift   NSGY following  analgo-sedation- precedex/fentanyl prn   Activity: [X] Bed rest     PULM: Resp failure secondary to neuro injury   - Weaning parameters  - Failing SAT/SBT - paradoxical breathing - A/C   - Wean Precedex- RASS  0 to -1  -- Solumedrol 40mg IV q12 x24 hrs   -  No cuff leak   lung protective vent support   PSV trial as tolerates  HOB>30  Aspiration precautions    CV:   -<150  Nicardipine weaning to off  Takes Lisinopril (held for hyperK 4.7- 5.0 )),   Amlodipine 10mg q day   echo    TTE Echo Complete w/o Contrast w/ Doppler (10.09.24 @ 12:41) >  Summary:   1. Technically limited study.   2. Normal global left ventricular systolic function.   3. Left ventricular ejection fraction, by visual estimation, is 65 to   70%.   4. The left ventricular diastolic function could not be assessed in this   study.   5. Normal right ventricular size and function.   6. Mildly enlarged left atrium.  7. Sclerotic aortic valve with normal opening.   8. Mild tricuspid regurgitation.   9. Normal pulmonary artery pressure.      RENAL: CKD- stable / Hyperphostemia/ Metabolic acidosis secondary to CKD   - Cr Cl - 29  - H2Co3  650mg q 8  - Will monitor electrolytes   - Lasix 20mg IV X1 place ocampo X 24 hrs then D/C - adequate I/O     GI:  NPO for possible extubation   GI prophylaxis [] not indicated [] PPI [x] pepcid   Bowel regimen [x] miralax [x] senna [] other:    ENDO:  Prediabetic    Goal -180   Monitor FS - HISS   Normal B-hydroxybutyrate- 0.4   Hold glimepiride, jardiance, pioglitazone  a1c- 6.0   TSH- 2.68     HEME/ONC:  MDS   VTE prophylaxis: [x] SCDs [ [x] heparin 5 K q 12 SQ   venous dopplers b/l LEs- neg - 10/9/24   MDS hx (follows with Dr Wade)- - On revlimid and Vidaza as OPT previously NOW on Lusaracept.     ID:  Cultures neg to date       SOCIAL/FAMILY:  [x] updated at bedside     CODE STATUS:  [x] Full Code     DISPOSITION:  [x] ICU

## 2024-10-13 LAB
ALBUMIN SERPL ELPH-MCNC: 3.5 G/DL — SIGNIFICANT CHANGE UP (ref 3.5–5.2)
ALP SERPL-CCNC: 92 U/L — SIGNIFICANT CHANGE UP (ref 30–115)
ALT FLD-CCNC: 12 U/L — SIGNIFICANT CHANGE UP (ref 0–41)
ANION GAP SERPL CALC-SCNC: 13 MMOL/L — SIGNIFICANT CHANGE UP (ref 7–14)
APPEARANCE UR: CLEAR — SIGNIFICANT CHANGE UP
AST SERPL-CCNC: 14 U/L — SIGNIFICANT CHANGE UP (ref 0–41)
BACTERIA # UR AUTO: NEGATIVE /HPF — SIGNIFICANT CHANGE UP
BASE EXCESS BLDA CALC-SCNC: -8.1 MMOL/L — LOW (ref -2–3)
BASOPHILS # BLD AUTO: 0.02 K/UL — SIGNIFICANT CHANGE UP (ref 0–0.2)
BASOPHILS NFR BLD AUTO: 0.1 % — SIGNIFICANT CHANGE UP (ref 0–1)
BILIRUB SERPL-MCNC: 1 MG/DL — SIGNIFICANT CHANGE UP (ref 0.2–1.2)
BILIRUB UR-MCNC: NEGATIVE — SIGNIFICANT CHANGE UP
BUN SERPL-MCNC: 54 MG/DL — HIGH (ref 10–20)
CALCIUM SERPL-MCNC: 9 MG/DL — SIGNIFICANT CHANGE UP (ref 8.4–10.5)
CAST: 12 /LPF — HIGH (ref 0–4)
CHLORIDE SERPL-SCNC: 119 MMOL/L — HIGH (ref 98–110)
CO2 SERPL-SCNC: 16 MMOL/L — LOW (ref 17–32)
COLOR SPEC: YELLOW — SIGNIFICANT CHANGE UP
CREAT SERPL-MCNC: 1.4 MG/DL — SIGNIFICANT CHANGE UP (ref 0.7–1.5)
CULTURE RESULTS: SIGNIFICANT CHANGE UP
CULTURE RESULTS: SIGNIFICANT CHANGE UP
DIFF PNL FLD: ABNORMAL
EGFR: 39 ML/MIN/1.73M2 — LOW
EOSINOPHIL # BLD AUTO: 0 K/UL — SIGNIFICANT CHANGE UP (ref 0–0.7)
EOSINOPHIL NFR BLD AUTO: 0 % — SIGNIFICANT CHANGE UP (ref 0–8)
FINE GRAN CASTS #/AREA URNS AUTO: PRESENT
GAS PNL BLDA: SIGNIFICANT CHANGE UP
GLUCOSE BLDC GLUCOMTR-MCNC: 116 MG/DL — HIGH (ref 70–99)
GLUCOSE BLDC GLUCOMTR-MCNC: 132 MG/DL — HIGH (ref 70–99)
GLUCOSE BLDC GLUCOMTR-MCNC: 214 MG/DL — HIGH (ref 70–99)
GLUCOSE BLDC GLUCOMTR-MCNC: 223 MG/DL — HIGH (ref 70–99)
GLUCOSE BLDC GLUCOMTR-MCNC: 78 MG/DL — SIGNIFICANT CHANGE UP (ref 70–99)
GLUCOSE SERPL-MCNC: 166 MG/DL — HIGH (ref 70–99)
GLUCOSE UR QL: NEGATIVE MG/DL — SIGNIFICANT CHANGE UP
HCO3 BLDA-SCNC: 16 MMOL/L — LOW (ref 21–28)
HCT VFR BLD CALC: 22.8 % — LOW (ref 37–47)
HGB BLD-MCNC: 7.4 G/DL — LOW (ref 12–16)
HOROWITZ INDEX BLDA+IHG-RTO: 100 — SIGNIFICANT CHANGE UP
IMM GRANULOCYTES NFR BLD AUTO: 1.1 % — HIGH (ref 0.1–0.3)
KETONES UR-MCNC: ABNORMAL MG/DL
LEUKOCYTE ESTERASE UR-ACNC: ABNORMAL
LYMPHOCYTES # BLD AUTO: 0.36 K/UL — LOW (ref 1.2–3.4)
LYMPHOCYTES # BLD AUTO: 1.9 % — LOW (ref 20.5–51.1)
MAGNESIUM SERPL-MCNC: 2.5 MG/DL — HIGH (ref 1.8–2.4)
MCHC RBC-ENTMCNC: 30.2 PG — SIGNIFICANT CHANGE UP (ref 27–31)
MCHC RBC-ENTMCNC: 32.5 G/DL — SIGNIFICANT CHANGE UP (ref 32–37)
MCV RBC AUTO: 93.1 FL — SIGNIFICANT CHANGE UP (ref 81–99)
MONOCYTES # BLD AUTO: 0.24 K/UL — SIGNIFICANT CHANGE UP (ref 0.1–0.6)
MONOCYTES NFR BLD AUTO: 1.3 % — LOW (ref 1.7–9.3)
MRSA PCR RESULT.: POSITIVE
NEUTROPHILS # BLD AUTO: 18.1 K/UL — HIGH (ref 1.4–6.5)
NEUTROPHILS NFR BLD AUTO: 95.6 % — HIGH (ref 42.2–75.2)
NITRITE UR-MCNC: NEGATIVE — SIGNIFICANT CHANGE UP
NRBC # BLD: 0 /100 WBCS — SIGNIFICANT CHANGE UP (ref 0–0)
PCO2 BLDA: 27 MMHG — LOW (ref 32–45)
PH BLDA: 7.37 — SIGNIFICANT CHANGE UP (ref 7.35–7.45)
PH UR: 5.5 — SIGNIFICANT CHANGE UP (ref 5–8)
PHOSPHATE SERPL-MCNC: 4.9 MG/DL — SIGNIFICANT CHANGE UP (ref 2.1–4.9)
PLATELET # BLD AUTO: 272 K/UL — SIGNIFICANT CHANGE UP (ref 130–400)
PMV BLD: 10.9 FL — HIGH (ref 7.4–10.4)
PO2 BLDA: 398 MMHG — HIGH (ref 83–108)
POTASSIUM SERPL-MCNC: 4.8 MMOL/L — SIGNIFICANT CHANGE UP (ref 3.5–5)
POTASSIUM SERPL-SCNC: 4.8 MMOL/L — SIGNIFICANT CHANGE UP (ref 3.5–5)
PROT SERPL-MCNC: 4.9 G/DL — LOW (ref 6–8)
PROT UR-MCNC: 30 MG/DL
RBC # BLD: 2.45 M/UL — LOW (ref 4.2–5.4)
RBC # FLD: 16.8 % — HIGH (ref 11.5–14.5)
RBC CASTS # UR COMP ASSIST: 61 /HPF — HIGH (ref 0–4)
SAO2 % BLDA: 99.2 % — HIGH (ref 94–98)
SODIUM SERPL-SCNC: 148 MMOL/L — HIGH (ref 135–146)
SP GR SPEC: 1.02 — SIGNIFICANT CHANGE UP (ref 1–1.03)
SPECIMEN SOURCE: SIGNIFICANT CHANGE UP
SPECIMEN SOURCE: SIGNIFICANT CHANGE UP
SQUAMOUS # UR AUTO: 1 /HPF — SIGNIFICANT CHANGE UP (ref 0–5)
UROBILINOGEN FLD QL: 0.2 MG/DL — SIGNIFICANT CHANGE UP (ref 0.2–1)
WBC # BLD: 18.93 K/UL — HIGH (ref 4.8–10.8)
WBC # FLD AUTO: 18.93 K/UL — HIGH (ref 4.8–10.8)
WBC UR QL: 8 /HPF — HIGH (ref 0–5)

## 2024-10-13 PROCEDURE — 71045 X-RAY EXAM CHEST 1 VIEW: CPT | Mod: 26

## 2024-10-13 PROCEDURE — 99291 CRITICAL CARE FIRST HOUR: CPT | Mod: GC

## 2024-10-13 RX ORDER — EPINEPHRINE 11.25MG/ML
0.5 SOLUTION, NON-ORAL INHALATION ONCE
Refills: 0 | Status: COMPLETED | OUTPATIENT
Start: 2024-10-13 | End: 2024-10-13

## 2024-10-13 RX ORDER — METHYLPREDNISOLONE ACETATE 80 MG/ML
40 INJECTION, SUSPENSION INTRALESIONAL; INTRAMUSCULAR; INTRASYNOVIAL; SOFT TISSUE EVERY 8 HOURS
Refills: 0 | Status: DISCONTINUED | OUTPATIENT
Start: 2024-10-14 | End: 2024-10-14

## 2024-10-13 RX ORDER — LABETALOL HCL 200 MG
10 TABLET ORAL ONCE
Refills: 0 | Status: DISCONTINUED | OUTPATIENT
Start: 2024-10-13 | End: 2024-10-13

## 2024-10-13 RX ORDER — LABETALOL HCL 200 MG
10 TABLET ORAL ONCE
Refills: 0 | Status: COMPLETED | OUTPATIENT
Start: 2024-10-13 | End: 2024-10-13

## 2024-10-13 RX ORDER — METHYLPREDNISOLONE ACETATE 80 MG/ML
40 INJECTION, SUSPENSION INTRALESIONAL; INTRAMUSCULAR; INTRASYNOVIAL; SOFT TISSUE ONCE
Refills: 0 | Status: COMPLETED | OUTPATIENT
Start: 2024-10-13 | End: 2024-10-13

## 2024-10-13 RX ORDER — ACETAMINOPHEN 500 MG
650 TABLET ORAL ONCE
Refills: 0 | Status: COMPLETED | OUTPATIENT
Start: 2024-10-13 | End: 2024-10-13

## 2024-10-13 RX ORDER — FUROSEMIDE 40 MG
20 TABLET ORAL ONCE
Refills: 0 | Status: COMPLETED | OUTPATIENT
Start: 2024-10-13 | End: 2024-10-13

## 2024-10-13 RX ORDER — IPRATROPIUM BROMIDE AND ALBUTEROL SULFATE .5; 2.5 MG/3ML; MG/3ML
3 SOLUTION RESPIRATORY (INHALATION) EVERY 4 HOURS
Refills: 0 | Status: DISCONTINUED | OUTPATIENT
Start: 2024-10-13 | End: 2024-10-15

## 2024-10-13 RX ORDER — LABETALOL HCL 200 MG
10 TABLET ORAL
Refills: 0 | Status: COMPLETED | OUTPATIENT
Start: 2024-10-13 | End: 2024-10-16

## 2024-10-13 RX ADMIN — Medication 0.5 MILLILITER(S): at 12:56

## 2024-10-13 RX ADMIN — Medication 650 MILLIGRAM(S): at 00:00

## 2024-10-13 RX ADMIN — Medication 50 MICROGRAM(S): at 00:15

## 2024-10-13 RX ADMIN — CALCIUM ACETATE 667 MILLIGRAM(S): 667 CAPSULE ORAL at 05:23

## 2024-10-13 RX ADMIN — Medication 650 MILLIGRAM(S): at 05:23

## 2024-10-13 RX ADMIN — Medication 0.5 MILLILITER(S): at 23:27

## 2024-10-13 RX ADMIN — Medication 2.5 MILLIGRAM(S): at 20:23

## 2024-10-13 RX ADMIN — Medication 4: at 11:39

## 2024-10-13 RX ADMIN — Medication 2.5 MILLIGRAM(S): at 15:52

## 2024-10-13 RX ADMIN — SODIUM CHLORIDE 4 MILLILITER(S): 9 INJECTION, SOLUTION INTRAMUSCULAR; INTRAVENOUS; SUBCUTANEOUS at 11:46

## 2024-10-13 RX ADMIN — Medication 10 MILLIGRAM(S): at 06:25

## 2024-10-13 RX ADMIN — Medication 2.5 MILLIGRAM(S): at 12:18

## 2024-10-13 RX ADMIN — HEPARIN SODIUM 5000 UNIT(S): 10000 INJECTION INTRAVENOUS; SUBCUTANEOUS at 05:23

## 2024-10-13 RX ADMIN — LEVETIRACETAM 400 MILLIGRAM(S): 500 TABLET, FILM COATED ORAL at 05:29

## 2024-10-13 RX ADMIN — Medication 10 MILLIGRAM(S): at 21:29

## 2024-10-13 RX ADMIN — LEVETIRACETAM 400 MILLIGRAM(S): 500 TABLET, FILM COATED ORAL at 17:30

## 2024-10-13 RX ADMIN — FAMOTIDINE 20 MILLIGRAM(S): 10 INJECTION INTRAVENOUS at 11:52

## 2024-10-13 RX ADMIN — METHYLPREDNISOLONE ACETATE 40 MILLIGRAM(S): 80 INJECTION, SUSPENSION INTRALESIONAL; INTRAMUSCULAR; INTRASYNOVIAL; SOFT TISSUE at 20:44

## 2024-10-13 RX ADMIN — POLYETHYLENE GLYCOL 3350 17 GRAM(S): 17 POWDER, FOR SOLUTION ORAL at 11:52

## 2024-10-13 RX ADMIN — Medication 20 MILLIGRAM(S): at 20:43

## 2024-10-13 RX ADMIN — CHLORHEXIDINE GLUCONATE 15 MILLILITER(S): 40 SOLUTION TOPICAL at 05:25

## 2024-10-13 RX ADMIN — Medication 2.5 MILLIGRAM(S): at 07:42

## 2024-10-13 RX ADMIN — Medication 25 MG/HR: at 09:59

## 2024-10-13 RX ADMIN — SODIUM CHLORIDE 4 MILLILITER(S): 9 INJECTION, SOLUTION INTRAMUSCULAR; INTRAVENOUS; SUBCUTANEOUS at 20:23

## 2024-10-13 RX ADMIN — HEPARIN SODIUM 5000 UNIT(S): 10000 INJECTION INTRAVENOUS; SUBCUTANEOUS at 17:29

## 2024-10-13 RX ADMIN — Medication 650 MILLIGRAM(S): at 16:34

## 2024-10-13 RX ADMIN — Medication 4: at 06:31

## 2024-10-13 RX ADMIN — DEXMEDETOMIDINE HYDROCHLORIDE 7.5 MICROGRAM(S)/KG/HR: 400 INJECTION, SOLUTION INTRAVENOUS at 09:59

## 2024-10-13 RX ADMIN — CHLORHEXIDINE GLUCONATE 1 APPLICATION(S): 40 SOLUTION TOPICAL at 05:25

## 2024-10-13 RX ADMIN — SODIUM CHLORIDE 4 MILLILITER(S): 9 INJECTION, SOLUTION INTRAMUSCULAR; INTRAVENOUS; SUBCUTANEOUS at 15:52

## 2024-10-13 RX ADMIN — SODIUM CHLORIDE 4 MILLILITER(S): 9 INJECTION, SOLUTION INTRAMUSCULAR; INTRAVENOUS; SUBCUTANEOUS at 07:43

## 2024-10-13 RX ADMIN — METHYLPREDNISOLONE ACETATE 40 MILLIGRAM(S): 80 INJECTION, SUSPENSION INTRALESIONAL; INTRAMUSCULAR; INTRASYNOVIAL; SOFT TISSUE at 05:23

## 2024-10-13 RX ADMIN — Medication 50 MICROGRAM(S): at 00:00

## 2024-10-13 NOTE — EEG REPORT - NS EEG TEXT BOX
[facility]   COMPREHENSIVE EPILEPSY CENTER   REPORT OF CONTINUOUS VIDEO EEG     [facility]: [address], [place] [address], Ph#: [phone]  [facility]: [id number], [place] [address], Ph#: [phone]  Office: [address] [place] [address], [facility] [address], [place] [address] Ph#: [phone]    Patient Name: SIMONA KUHN  Age and : 79y ([birth year])  MRN #: [id number]  Location: [address]  Referring Physician: Sarwat Ortiz    Study Date: 10-11-24 at 07:00 - 10-12-24 at 07:00  Duration: 24 hr  _____________________________________________________________  STUDY INFORMATION    EEG Recording Technique:  The patient underwent continuous Video-EEG monitoring, using Telemetry System hardware on the XLTek Digital System. EEG and video data were stored on a computer hard drive with important events saved in digital archive files. The material was reviewed by a physician (electroencephalographer / epileptologist) on a daily basis. Yadiel and seizure detection algorithms were utilized and reviewed. An EEG Technician attended to the patient, and was available throughout daytime work hours.  The epilepsy center neurologist was available in person or on call 24-hours per day.    EEG Placement and Labeling of Electrodes:  The EEG was performed utilizing 20 channel referential EEG connections (coronal over temporal over parasagittal montage) using all standard 10-20 electrode placements with EKG, with additional electrodes placed in the inferior temporal region using the modified 10-10 montage electrode placements for elective admissions, or if deemed necessary. Recording was at a sampling rate of 256 samples per second per channel. Time synchronized digital video recording was done simultaneously with EEG recording. A low light infrared camera was used for low light recording.     _____________________________________________________________  HISTORY    Patient is a 79y old  Female who presents with a chief complaint of Traumatic subdural hemorrhage without loss of consciousness, initial encounter     (09 Oct 2024 16:29)      PERTINENT MEDICATION:  MEDICATIONS  (STANDING):  albuterol    0.083% 2.5 milliGRAM(s) Nebulizer every 4 hours  calcium acetate 667 milliGRAM(s) Oral every 8 hours  chlorhexidine 0.12% Liquid 15 milliLiter(s) Oral Mucosa every 12 hours  chlorhexidine 2% Cloths 1 Application(s) Topical <User Schedule>  dexMEDEtomidine Infusion 0.2 MICROgram(s)/kG/Hr (3 mL/Hr) IV Continuous <Continuous>  dextrose 50% Injectable 50 milliLiter(s) IV Push every 15 minutes  dextrose 50% Injectable 25 milliLiter(s) IV Push every 15 minutes  dextrose 50% Injectable 25 milliLiter(s) IV Push every 15 minutes  famotidine    Tablet 20 milliGRAM(s) Oral daily  insulin lispro (ADMELOG) corrective regimen sliding scale   SubCutaneous every 6 hours  levETIRAcetam  IVPB 750 milliGRAM(s) IV Intermittent every 12 hours  methylPREDNISolone sodium succinate Injectable 40 milliGRAM(s) IV Push once  niCARdipine Infusion 5 mG/Hr (25 mL/Hr) IV Continuous <Continuous>  polyethylene glycol 3350 17 Gram(s) Oral daily  senna 2 Tablet(s) Oral at bedtime  sodium bicarbonate 650 milliGRAM(s) Oral every 12 hours  sodium chloride 3%  Inhalation 4 milliLiter(s) Inhalation every 4 hours    _____________________________________________________________  INTERPRETATION    Findings: The background was continuous, spontaneously variable and reactive. No PDR was seen.    Background Slowing:  -Continuous diffuse theta and polymorphic delta slowing.    Focal Slowing:   None was present.    Sleep Background:  Stage II sleep transients were not recorded.  Drowsiness and stage II sleep transients were not recorded.    Other Non-Epileptiform Findings:  -Occasional generalized triphasic waves, maximum bifrontal, R>L    Interictal Epileptiform Activity:   None were present.    Events:  Clinical events: None recorded.  Seizures: None recorded.    Artifacts:  Intermittent myogenic and movement artifacts were noted.    ECG:  The heart rate on single channel ECG was predominantly between 60-80 BPM.    _____________________________________________________________  EEG SUMMARY/CLASSIFICATION    Abnormal EEG in an encephalopathic patient.  -Triphasic waves, generalized  -Continuous slowing, generalized, moderate to severe  _____________________________________________________________  EEG IMPRESSION/CLINICAL CORRELATE    Abnormal EEG study.  1. Moderate to severe diffuse or multifocal cerebral dysfunction, which may have a metabolic component.   2. No epileptiform pattern or seizure seen.      Yoel Gonzalez MD  Neurology Attending Physician    
[facility]   COMPREHENSIVE EPILEPSY CENTER   REPORT OF CONTINUOUS VIDEO EEG     [facility]: [address], [place] [address], Ph#: [phone]  [facility]: [id number], [place] [address], Ph#: [phone]  Office: [address] [place] [address], [facility] [address], [place] [address] Ph#: [phone]    Patient Name: SIMONA KUHN  Age and : 79y (47)  MRN #: [id number]  Location: [address]  Referring Physician: Sarwat Ortiz    Study Date: 10-12-24 at 07:00 - 10-13-24 at 07:00  Duration: 24 hr  _____________________________________________________________  STUDY INFORMATION    EEG Recording Technique:  The patient underwent continuous Video-EEG monitoring, using Telemetry System hardware on the XLTek Digital System. EEG and video data were stored on a computer hard drive with important events saved in digital archive files. The material was reviewed by a physician (electroencephalographer / epileptologist) on a daily basis. Yadiel and seizure detection algorithms were utilized and reviewed. An EEG Technician attended to the patient, and was available throughout daytime work hours.  The epilepsy center neurologist was available in person or on call 24-hours per day.    EEG Placement and Labeling of Electrodes:  The EEG was performed utilizing 20 channel referential EEG connections (coronal over temporal over parasagittal montage) using all standard 10-20 electrode placements with EKG, with additional electrodes placed in the inferior temporal region using the modified 10-10 montage electrode placements for elective admissions, or if deemed necessary. Recording was at a sampling rate of 256 samples per second per channel. Time synchronized digital video recording was done simultaneously with EEG recording. A low light infrared camera was used for low light recording.     _____________________________________________________________  HISTORY    Patient is a 79y old  Female who presents with a chief complaint of Traumatic subdural hemorrhage without loss of consciousness, initial encounter     (09 Oct 2024 16:29)      PERTINENT MEDICATION:  MEDICATIONS  (STANDING):  albuterol    0.083% 2.5 milliGRAM(s) Nebulizer every 4 hours  calcium acetate 667 milliGRAM(s) Oral every 8 hours  chlorhexidine 0.12% Liquid 15 milliLiter(s) Oral Mucosa every 12 hours  chlorhexidine 2% Cloths 1 Application(s) Topical <User Schedule>  dexMEDEtomidine Infusion 0.2 MICROgram(s)/kG/Hr (3 mL/Hr) IV Continuous <Continuous>  dextrose 50% Injectable 50 milliLiter(s) IV Push every 15 minutes  dextrose 50% Injectable 25 milliLiter(s) IV Push every 15 minutes  dextrose 50% Injectable 25 milliLiter(s) IV Push every 15 minutes  famotidine    Tablet 20 milliGRAM(s) Oral daily  insulin lispro (ADMELOG) corrective regimen sliding scale   SubCutaneous every 6 hours  levETIRAcetam  IVPB 750 milliGRAM(s) IV Intermittent every 12 hours  methylPREDNISolone sodium succinate Injectable 40 milliGRAM(s) IV Push once  niCARdipine Infusion 5 mG/Hr (25 mL/Hr) IV Continuous <Continuous>  polyethylene glycol 3350 17 Gram(s) Oral daily  senna 2 Tablet(s) Oral at bedtime  sodium bicarbonate 650 milliGRAM(s) Oral every 12 hours  sodium chloride 3%  Inhalation 4 milliLiter(s) Inhalation every 4 hours    _____________________________________________________________  INTERPRETATION    Findings: The background was continuous, spontaneously variable and reactive. No PDR was seen.    Background Slowing:  -Continuous diffuse theta and polymorphic delta slowing.    Focal Slowing:   None was present.    Sleep Background:  Stage II sleep transients were not recorded.  Drowsiness and stage II sleep transients were not recorded.    Other Non-Epileptiform Findings:  -Occasional generalized triphasic waves, maximum bifrontal, R>L    Interictal Epileptiform Activity:   None were present.    Events:  Clinical events: None recorded.  Seizures: None recorded.    Artifacts:  Intermittent myogenic and movement artifacts were noted.    ECG:  The heart rate on single channel ECG was predominantly between 60-80 BPM.    _____________________________________________________________  EEG SUMMARY/CLASSIFICATION    Abnormal EEG in an encephalopathic patient.  -Triphasic waves, generalized  -Continuous slowing, generalized, moderate to severe  _____________________________________________________________  EEG IMPRESSION/CLINICAL CORRELATE    Abnormal EEG study.  1. Moderate to severe diffuse or multifocal cerebral dysfunction, which may have a metabolic component.   2. No epileptiform pattern or seizure seen.      Yoel Gonzalez MD  Neurology Attending Physician    
10/10/24 07:00 to 10/11/24 07:00
10/9/24 20:14- 10/10/24 10:00

## 2024-10-13 NOTE — PROGRESS NOTE ADULT - SUBJECTIVE AND OBJECTIVE BOX
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 Per pt's daughter, pt was LKW - 10/7/24  evening. Daughter states this morning pt was not answering the phone and did not open the door for her home health aide. Daughter came to home and found her on the floor, pt was conscious and told daughter "I fell". On initial arrival to ED pt only EO to pain, not following commands, WD extremities. Underwent CTH showing R hemispheric SDH 2.2cm w/ mass effect and 1.1cm MLS. On reassessment in ED pt opening eyes to voice, answering some questions and following simple commands. SBP after CT in 200s, started on Nicardipine gtt. Received 1g IV Keppra, 1850mL LR bolus, and was started on insulin gtt for BP in 400s. Pt assessed by NSGY, going to OR for evacuation. Admitted to NSICU for further management.    Overnight - agitation     On admission   GCS 15       POD #4- s/p craniotomy and hematoma evacuation with improving MLS c/b by postop mild R SD recollection               S/P 4 U PRBC / i FFP/i PLT               Intubated   POD #3- S/P MMA      VITALS: [x] Reviewed      EXAMINATION:  General: WN/WD, no acute distress  HENT: NC/AT, moist oral mucosa, orally intubated  Eyes: Anicteric sclerae  Cardiac: [id number]  Lungs: Clear  Abdomen: Soft, non-tender, +BS  Extremities: mild UE edea  Skin/Incision Site: Clean, dry and intact  Neurologic: precedex/on board, partially sedated, arousable tt o voice ,  tracking, +command following on all four, perrl, symmetric face, spont moves antigravity R > L antigravity             ICU Vital Signs Last 24 Hrs  T(C): 36.4 (13 Oct 2024 08:00), Max: 37.7 (13 Oct 2024 04:00)  T(F): 97.5 (13 Oct 2024 08:00), Max: 99.8 (13 Oct 2024 04:00)  HR: 96 (13 Oct 2024 09:45) (57 - 128)  BP: --  BP(mean): --  ABP: 139/51 (13 Oct 2024 09:45) (104/40 - 164/55)  ABP(mean): 82 (13 Oct 2024 09:45) (63 - 91)  RR: 17 (13 Oct 2024 09:45) (4 - 25)  SpO2: 100% (13 Oct 2024 09:45) (100% - 100%)      10-12-24 @ 07:01  -  10-13-24 @ 07:00  --------------------------------------------------------  IN: 1074.3 mL / OUT: 2115 mL / NET: -1040.7 mL    10-13-24 @ 07:01  -  10-13-24 @ 10:55  --------------------------------------------------------  IN: 37.8 mL / OUT: 90 mL / NET: -52.2 mL        Mode: CPAP with PS, FiO2: 35, PEEP: 5, PS: 5, ITime: 1, MAP: 6, PIP: 12    albuterol    0.083% 2.5 milliGRAM(s) Nebulizer every 4 hours  amLODIPine   Tablet 10 milliGRAM(s) Oral daily  calcium acetate 667 milliGRAM(s) Oral every 8 hours  chlorhexidine 0.12% Liquid 15 milliLiter(s) Oral Mucosa every 12 hours  chlorhexidine 2% Cloths 1 Application(s) Topical <User Schedule>  dexMEDEtomidine Infusion 0.5 MICROgram(s)/kG/Hr (7.5 mL/Hr) IV Continuous <Continuous>  dextrose 50% Injectable 25 milliLiter(s) IV Push every 15 minutes  dextrose 50% Injectable 50 milliLiter(s) IV Push every 15 minutes  dextrose 50% Injectable 25 milliLiter(s) IV Push every 15 minutes  famotidine    Tablet 20 milliGRAM(s) Oral daily  fentaNYL    Injectable 50 MICROGram(s) IV Push every 8 hours PRN  heparin   Injectable 5000 Unit(s) SubCutaneous every 12 hours  insulin lispro (ADMELOG) corrective regimen sliding scale   SubCutaneous every 6 hours  levETIRAcetam  IVPB 750 milliGRAM(s) IV Intermittent every 12 hours  methylPREDNISolone sodium succinate Injectable 40 milliGRAM(s) IV Push every 12 hours  niCARdipine Infusion 5 mG/Hr (25 mL/Hr) IV Continuous <Continuous>  polyethylene glycol 3350 17 Gram(s) Oral daily  racepinephrine  2.25% Inhalation 0.5 milliLiter(s) Inhalation once  senna 2 Tablet(s) Oral at bedtime  sodium bicarbonate 650 milliGRAM(s) Oral every 8 hours  sodium chloride 3%  Inhalation 4 milliLiter(s) Inhalation every 4 hours      LABS:  Na: 148 (10-13 @ 05:20), 147 (10-12 @ 04:48), 143 (10-11 @ 04:30)  K: 4.8 (10-13 @ 05:20), 4.7 (10-12 @ 04:48), 5.0 (10-11 @ 04:30)  Cl: 119 (10-13 @ 05:20), 119 (10-12 @ 04:48), 119 (10-11 @ 04:30)  CO2: 16 (10-13 @ 05:20), 17 (10-12 @ 04:48), 17 (10-11 @ 04:30)  BUN: 54 (10-13 @ 05:20), 56 (10-12 @ 04:48), 49 (10-11 @ 04:30)  Cr: 1.4 (10-13 @ 05:20), 1.5 (10-12 @ 04:48), 1.6 (10-11 @ 04:30)  Glu: 166(10-13 @ 05:20), 168(10-12 @ 04:48), 154(10-11 @ 04:30)    Hgb: 7.4 (10-13 @ 05:20), 7.3 (10-12 @ 04:48), 7.7 (10-11 @ 04:30)  Hct: 22.8 (10-13 @ 05:20), 22.0 (10-12 @ 04:48), 22.2 (10-11 @ 04:30)  WBC: 18.93 (10-13 @ 05:20), 13.00 (10-12 @ 04:48), 15.33 (10-11 @ 04:30)  Plt: 272 (10-13 @ 05:20), 266 (10-12 @ 04:48), 292 (10-11 @ 04:30)    INR:   PTT:           LIVER FUNCTIONS - ( 13 Oct 2024 05:20 )  Alb: 3.5 g/dL / Pro: 4.9 g/dL / ALK PHOS: 92 U/L / ALT: 12 U/L / AST: 14 U/L / GGT: x           ABG - ( 13 Oct 2024 10:44 )  pH, Arterial: 7.30  pH, Blood: x     /  pCO2: 30    /  pO2: 181   / HCO3: 15    / Base Excess: -10.6 /  SaO2: 99.4

## 2024-10-13 NOTE — PROGRESS NOTE ADULT - ASSESSMENT
ASSESSMENT/PLAN:  79F w/ PMHx of MDS, DM, HTN, CKD presents after fall at home, found to have R SDH.   s/p 3 s/p crani and hematoma evacuation and MMA embolization     NEURO:  q4h neurochecks  repeat cth 10/10/24- stable - R Holo acute SDH - 4.5 R---> L shift   NSGY following  analgo-sedation- precedex/fentanyl prn   Activity: [X] Bed rest     PULM: Resp failure secondary to neuro injury   - Weaning parameters  PSV and tolerating- pt awake following commands, minimal secretion burden, good gas exchange on minimal vent support, pt with + cuff leak this AM, will plan to extubate; family made aware of risks of possible reintubation-- plan to extubate to HFNC, with VL, ambu-bag, race-epi at bedside    - Wean Precedex- RASS  0 to -1  -- Solumedrol 40mg IV q12 x24 hrs   lung protective vent support   PSV trial as tolerates  HOB>30  Aspiration precautions    CV:   -<150  Nicardipine weaning to off  Takes Lisinopril (held for hyperK 4.7- 5.0 )),   Amlodipine 10mg q day   echo    TTE Echo Complete w/o Contrast w/ Doppler (10.09.24 @ 12:41) >  Summary:   1. Technically limited study.   2. Normal global left ventricular systolic function.   3. Left ventricular ejection fraction, by visual estimation, is 65 to   70%.   4. The left ventricular diastolic function could not be assessed in this   study.   5. Normal right ventricular size and function.   6. Mildly enlarged left atrium.  7. Sclerotic aortic valve with normal opening.   8. Mild tricuspid regurgitation.   9. Normal pulmonary artery pressure.      RENAL: CKD- stable / Hyperphostemia/ Metabolic acidosis secondary to CKD   - H2Co3  650mg q 8  - Will monitor electrolytes   stable MARCO on CKD  ocampo placed on 10/12    GI:  NPO for possible extubation   GI prophylaxis [] not indicated [] PPI [x] pepcid   Bowel regimen [x] miralax [x] senna [] other:  LBM pending    ENDO:  Prediabetic    Goal -180   Monitor FS - HISS   Hold glimepiride, jardiance, pioglitazone  a1c- 6.0   TSH- 2.68     HEME/ONC:  MDS   VTE prophylaxis: [x] SCDs [ [x] heparin 5 K q 12 SQ   venous dopplers b/l LEs- neg - 10/9/24   MDS hx (follows with Dr Wade)- - On revlimid and Vidaza as OPT previously NOW on Lusaracept.     ID:  low grade temp  obtain endotracheal culture if available  cxr on 10/12 with ?perihilar opacity on L mid lung zone   Cultures neg  PCT on 10/9 is 7.3--> repeat today    UA repeat  MRSA       SOCIAL/FAMILY:  [x] updated at bedside and over phone    CODE STATUS:  [x] Full Code     DISPOSITION:  [x] ICU

## 2024-10-14 LAB
ALBUMIN SERPL ELPH-MCNC: 4 G/DL — SIGNIFICANT CHANGE UP (ref 3.5–5.2)
ALP SERPL-CCNC: 98 U/L — SIGNIFICANT CHANGE UP (ref 30–115)
ALT FLD-CCNC: 17 U/L — SIGNIFICANT CHANGE UP (ref 0–41)
ANION GAP SERPL CALC-SCNC: 14 MMOL/L — SIGNIFICANT CHANGE UP (ref 7–14)
ANION GAP SERPL CALC-SCNC: 17 MMOL/L — HIGH (ref 7–14)
ANION GAP SERPL CALC-SCNC: 9 MMOL/L — SIGNIFICANT CHANGE UP (ref 7–14)
AST SERPL-CCNC: 22 U/L — SIGNIFICANT CHANGE UP (ref 0–41)
BASE EXCESS BLDA CALC-SCNC: -12.2 MMOL/L — LOW (ref -2–3)
BASOPHILS # BLD AUTO: 0.01 K/UL — SIGNIFICANT CHANGE UP (ref 0–0.2)
BASOPHILS NFR BLD AUTO: 0.1 % — SIGNIFICANT CHANGE UP (ref 0–1)
BILIRUB SERPL-MCNC: 0.9 MG/DL — SIGNIFICANT CHANGE UP (ref 0.2–1.2)
BUN SERPL-MCNC: 63 MG/DL — CRITICAL HIGH (ref 10–20)
BUN SERPL-MCNC: 67 MG/DL — CRITICAL HIGH (ref 10–20)
BUN SERPL-MCNC: 73 MG/DL — CRITICAL HIGH (ref 10–20)
CALCIUM SERPL-MCNC: 8.7 MG/DL — SIGNIFICANT CHANGE UP (ref 8.4–10.5)
CALCIUM SERPL-MCNC: 8.7 MG/DL — SIGNIFICANT CHANGE UP (ref 8.4–10.5)
CALCIUM SERPL-MCNC: 9.1 MG/DL — SIGNIFICANT CHANGE UP (ref 8.4–10.4)
CHLORIDE SERPL-SCNC: 115 MMOL/L — HIGH (ref 98–110)
CHLORIDE SERPL-SCNC: 119 MMOL/L — HIGH (ref 98–110)
CHLORIDE SERPL-SCNC: 122 MMOL/L — HIGH (ref 98–110)
CO2 SERPL-SCNC: 12 MMOL/L — LOW (ref 17–32)
CO2 SERPL-SCNC: 14 MMOL/L — LOW (ref 17–32)
CO2 SERPL-SCNC: 15 MMOL/L — LOW (ref 17–32)
CREAT SERPL-MCNC: 1.5 MG/DL — SIGNIFICANT CHANGE UP (ref 0.7–1.5)
CREAT SERPL-MCNC: 1.5 MG/DL — SIGNIFICANT CHANGE UP (ref 0.7–1.5)
CREAT SERPL-MCNC: 1.7 MG/DL — HIGH (ref 0.7–1.5)
EGFR: 30 ML/MIN/1.73M2 — LOW
EGFR: 35 ML/MIN/1.73M2 — LOW
EGFR: 35 ML/MIN/1.73M2 — LOW
EOSINOPHIL # BLD AUTO: 0 K/UL — SIGNIFICANT CHANGE UP (ref 0–0.7)
EOSINOPHIL NFR BLD AUTO: 0 % — SIGNIFICANT CHANGE UP (ref 0–8)
GAS PNL BLDA: SIGNIFICANT CHANGE UP
GAS PNL BLDA: SIGNIFICANT CHANGE UP
GLUCOSE BLDC GLUCOMTR-MCNC: 153 MG/DL — HIGH (ref 70–99)
GLUCOSE BLDC GLUCOMTR-MCNC: 197 MG/DL — HIGH (ref 70–99)
GLUCOSE BLDC GLUCOMTR-MCNC: 302 MG/DL — HIGH (ref 70–99)
GLUCOSE BLDC GLUCOMTR-MCNC: 331 MG/DL — HIGH (ref 70–99)
GLUCOSE SERPL-MCNC: 166 MG/DL — HIGH (ref 70–99)
GLUCOSE SERPL-MCNC: 173 MG/DL — HIGH (ref 70–99)
GLUCOSE SERPL-MCNC: 263 MG/DL — HIGH (ref 70–99)
HCO3 BLDA-SCNC: 14 MMOL/L — LOW (ref 21–28)
HCT VFR BLD CALC: 23.1 % — LOW (ref 37–47)
HGB BLD-MCNC: 7.4 G/DL — LOW (ref 12–16)
HOROWITZ INDEX BLDA+IHG-RTO: 40 — SIGNIFICANT CHANGE UP
IMM GRANULOCYTES NFR BLD AUTO: 1 % — HIGH (ref 0.1–0.3)
LYMPHOCYTES # BLD AUTO: 0.27 K/UL — LOW (ref 1.2–3.4)
LYMPHOCYTES # BLD AUTO: 1.5 % — LOW (ref 20.5–51.1)
MAGNESIUM SERPL-MCNC: 2.7 MG/DL — HIGH (ref 1.8–2.4)
MCHC RBC-ENTMCNC: 30.2 PG — SIGNIFICANT CHANGE UP (ref 27–31)
MCHC RBC-ENTMCNC: 32 G/DL — SIGNIFICANT CHANGE UP (ref 32–37)
MCV RBC AUTO: 94.3 FL — SIGNIFICANT CHANGE UP (ref 81–99)
MONOCYTES # BLD AUTO: 0.38 K/UL — SIGNIFICANT CHANGE UP (ref 0.1–0.6)
MONOCYTES NFR BLD AUTO: 2.1 % — SIGNIFICANT CHANGE UP (ref 1.7–9.3)
NEUTROPHILS # BLD AUTO: 17.42 K/UL — HIGH (ref 1.4–6.5)
NEUTROPHILS NFR BLD AUTO: 95.3 % — HIGH (ref 42.2–75.2)
NRBC # BLD: 0 /100 WBCS — SIGNIFICANT CHANGE UP (ref 0–0)
PCO2 BLDA: 30 MMHG — LOW (ref 32–45)
PH BLDA: 7.26 — LOW (ref 7.35–7.45)
PHOSPHATE SERPL-MCNC: 5.3 MG/DL — HIGH (ref 2.1–4.9)
PLATELET # BLD AUTO: 304 K/UL — SIGNIFICANT CHANGE UP (ref 130–400)
PMV BLD: 10.9 FL — HIGH (ref 7.4–10.4)
PO2 BLDA: 168 MMHG — HIGH (ref 83–108)
POTASSIUM SERPL-MCNC: 4.4 MMOL/L — SIGNIFICANT CHANGE UP (ref 3.5–5)
POTASSIUM SERPL-MCNC: 5.2 MMOL/L — HIGH (ref 3.5–5)
POTASSIUM SERPL-MCNC: 5.2 MMOL/L — HIGH (ref 3.5–5)
POTASSIUM SERPL-SCNC: 4.4 MMOL/L — SIGNIFICANT CHANGE UP (ref 3.5–5)
POTASSIUM SERPL-SCNC: 5.2 MMOL/L — HIGH (ref 3.5–5)
POTASSIUM SERPL-SCNC: 5.2 MMOL/L — HIGH (ref 3.5–5)
PROCALCITONIN SERPL-MCNC: 1.04 NG/ML — HIGH (ref 0.02–0.1)
PROT SERPL-MCNC: 5.5 G/DL — LOW (ref 6–8)
RBC # BLD: 2.45 M/UL — LOW (ref 4.2–5.4)
RBC # FLD: 17 % — HIGH (ref 11.5–14.5)
SAO2 % BLDA: 99 % — HIGH (ref 94–98)
SODIUM SERPL-SCNC: 144 MMOL/L — SIGNIFICANT CHANGE UP (ref 135–146)
SODIUM SERPL-SCNC: 145 MMOL/L — SIGNIFICANT CHANGE UP (ref 135–146)
SODIUM SERPL-SCNC: 148 MMOL/L — HIGH (ref 135–146)
WBC # BLD: 18.26 K/UL — HIGH (ref 4.8–10.8)
WBC # FLD AUTO: 18.26 K/UL — HIGH (ref 4.8–10.8)

## 2024-10-14 PROCEDURE — 71045 X-RAY EXAM CHEST 1 VIEW: CPT | Mod: 26

## 2024-10-14 PROCEDURE — 99291 CRITICAL CARE FIRST HOUR: CPT | Mod: GC

## 2024-10-14 RX ORDER — SODIUM BICARBONATE 1 MEQ/ML
650 VIAL (ML) INTRAVENOUS EVERY 8 HOURS
Refills: 0 | Status: DISCONTINUED | OUTPATIENT
Start: 2024-10-14 | End: 2024-10-14

## 2024-10-14 RX ORDER — LEVETIRACETAM 500 MG/1
750 TABLET, FILM COATED ORAL
Refills: 0 | Status: DISCONTINUED | OUTPATIENT
Start: 2024-10-14 | End: 2024-10-15

## 2024-10-14 RX ORDER — FUROSEMIDE 40 MG
20 TABLET ORAL ONCE
Refills: 0 | Status: COMPLETED | OUTPATIENT
Start: 2024-10-14 | End: 2024-10-14

## 2024-10-14 RX ORDER — METHYLPREDNISOLONE ACETATE 80 MG/ML
40 INJECTION, SUSPENSION INTRALESIONAL; INTRAMUSCULAR; INTRASYNOVIAL; SOFT TISSUE EVERY 8 HOURS
Refills: 0 | Status: COMPLETED | OUTPATIENT
Start: 2024-10-14 | End: 2024-10-15

## 2024-10-14 RX ORDER — FAMOTIDINE 10 MG/ML
20 INJECTION INTRAVENOUS DAILY
Refills: 0 | Status: COMPLETED | OUTPATIENT
Start: 2024-10-14 | End: 2024-10-14

## 2024-10-14 RX ORDER — MUPIROCIN 20 MG/G
1 OINTMENT TOPICAL EVERY 12 HOURS
Refills: 0 | Status: DISCONTINUED | OUTPATIENT
Start: 2024-10-14 | End: 2024-10-14

## 2024-10-14 RX ORDER — SODIUM ZIRCONIUM CYCLOSILICATE 10 G/10G
5 POWDER, FOR SUSPENSION ORAL EVERY 24 HOURS
Refills: 0 | Status: DISCONTINUED | OUTPATIENT
Start: 2024-10-14 | End: 2024-10-16

## 2024-10-14 RX ORDER — LABETALOL HCL 200 MG
200 TABLET ORAL ONCE
Refills: 0 | Status: COMPLETED | OUTPATIENT
Start: 2024-10-14 | End: 2024-10-14

## 2024-10-14 RX ORDER — SODIUM BICARBONATE 1 MEQ/ML
1300 VIAL (ML) INTRAVENOUS EVERY 8 HOURS
Refills: 0 | Status: DISCONTINUED | OUTPATIENT
Start: 2024-10-14 | End: 2024-10-18

## 2024-10-14 RX ORDER — MUPIROCIN 20 MG/G
1 OINTMENT TOPICAL EVERY 12 HOURS
Refills: 0 | Status: DISCONTINUED | OUTPATIENT
Start: 2024-10-14 | End: 2024-10-17

## 2024-10-14 RX ORDER — INSULIN LISPRO 100/ML
4 VIAL (ML) SUBCUTANEOUS
Refills: 0 | Status: DISCONTINUED | OUTPATIENT
Start: 2024-10-14 | End: 2024-10-15

## 2024-10-14 RX ORDER — LABETALOL HCL 200 MG
100 TABLET ORAL EVERY 8 HOURS
Refills: 0 | Status: DISCONTINUED | OUTPATIENT
Start: 2024-10-14 | End: 2024-10-16

## 2024-10-14 RX ORDER — TRAMADOL HYDROCHLORIDE 50 MG/1
50 TABLET, COATED ORAL EVERY 6 HOURS
Refills: 0 | Status: DISCONTINUED | OUTPATIENT
Start: 2024-10-14 | End: 2024-10-14

## 2024-10-14 RX ORDER — ACETAMINOPHEN 500 MG
975 TABLET ORAL EVERY 6 HOURS
Refills: 0 | Status: DISCONTINUED | OUTPATIENT
Start: 2024-10-14 | End: 2024-10-18

## 2024-10-14 RX ORDER — TRAMADOL HYDROCHLORIDE 50 MG/1
25 TABLET, COATED ORAL EVERY 6 HOURS
Refills: 0 | Status: DISCONTINUED | OUTPATIENT
Start: 2024-10-14 | End: 2024-10-14

## 2024-10-14 RX ADMIN — METHYLPREDNISOLONE ACETATE 40 MILLIGRAM(S): 80 INJECTION, SUSPENSION INTRALESIONAL; INTRAMUSCULAR; INTRASYNOVIAL; SOFT TISSUE at 05:13

## 2024-10-14 RX ADMIN — Medication 100 MILLIGRAM(S): at 13:16

## 2024-10-14 RX ADMIN — Medication 2: at 05:50

## 2024-10-14 RX ADMIN — Medication 20 MILLIGRAM(S): at 05:13

## 2024-10-14 RX ADMIN — CHLORHEXIDINE GLUCONATE 1 APPLICATION(S): 40 SOLUTION TOPICAL at 05:14

## 2024-10-14 RX ADMIN — Medication 650 MILLIGRAM(S): at 10:13

## 2024-10-14 RX ADMIN — Medication 4 UNIT(S): at 17:40

## 2024-10-14 RX ADMIN — SODIUM ZIRCONIUM CYCLOSILICATE 5 GRAM(S): 10 POWDER, FOR SUSPENSION ORAL at 10:13

## 2024-10-14 RX ADMIN — IPRATROPIUM BROMIDE AND ALBUTEROL SULFATE 3 MILLILITER(S): .5; 2.5 SOLUTION RESPIRATORY (INHALATION) at 08:37

## 2024-10-14 RX ADMIN — Medication 2 TABLET(S): at 21:48

## 2024-10-14 RX ADMIN — LEVETIRACETAM 400 MILLIGRAM(S): 500 TABLET, FILM COATED ORAL at 05:13

## 2024-10-14 RX ADMIN — IPRATROPIUM BROMIDE AND ALBUTEROL SULFATE 3 MILLILITER(S): .5; 2.5 SOLUTION RESPIRATORY (INHALATION) at 13:47

## 2024-10-14 RX ADMIN — FAMOTIDINE 20 MILLIGRAM(S): 10 INJECTION INTRAVENOUS at 12:00

## 2024-10-14 RX ADMIN — DEXMEDETOMIDINE HYDROCHLORIDE 7.5 MICROGRAM(S)/KG/HR: 400 INJECTION, SOLUTION INTRAVENOUS at 02:27

## 2024-10-14 RX ADMIN — IPRATROPIUM BROMIDE AND ALBUTEROL SULFATE 3 MILLILITER(S): .5; 2.5 SOLUTION RESPIRATORY (INHALATION) at 17:32

## 2024-10-14 RX ADMIN — POLYETHYLENE GLYCOL 3350 17 GRAM(S): 17 POWDER, FOR SOLUTION ORAL at 12:00

## 2024-10-14 RX ADMIN — Medication 8: at 17:40

## 2024-10-14 RX ADMIN — IPRATROPIUM BROMIDE AND ALBUTEROL SULFATE 3 MILLILITER(S): .5; 2.5 SOLUTION RESPIRATORY (INHALATION) at 20:05

## 2024-10-14 RX ADMIN — Medication 2: at 23:05

## 2024-10-14 RX ADMIN — Medication 100 MILLIGRAM(S): at 21:49

## 2024-10-14 RX ADMIN — Medication 1300 MILLIGRAM(S): at 21:49

## 2024-10-14 RX ADMIN — IPRATROPIUM BROMIDE AND ALBUTEROL SULFATE 3 MILLILITER(S): .5; 2.5 SOLUTION RESPIRATORY (INHALATION) at 05:28

## 2024-10-14 RX ADMIN — Medication 1300 MILLIGRAM(S): at 13:16

## 2024-10-14 RX ADMIN — HEPARIN SODIUM 5000 UNIT(S): 10000 INJECTION INTRAVENOUS; SUBCUTANEOUS at 17:39

## 2024-10-14 RX ADMIN — CHLORHEXIDINE GLUCONATE 15 MILLILITER(S): 40 SOLUTION TOPICAL at 05:13

## 2024-10-14 RX ADMIN — IPRATROPIUM BROMIDE AND ALBUTEROL SULFATE 3 MILLILITER(S): .5; 2.5 SOLUTION RESPIRATORY (INHALATION) at 00:23

## 2024-10-14 RX ADMIN — HEPARIN SODIUM 5000 UNIT(S): 10000 INJECTION INTRAVENOUS; SUBCUTANEOUS at 05:13

## 2024-10-14 RX ADMIN — Medication 8: at 12:04

## 2024-10-14 RX ADMIN — Medication 10 MILLIGRAM(S): at 10:13

## 2024-10-14 RX ADMIN — MUPIROCIN 1 APPLICATION(S): 20 OINTMENT TOPICAL at 17:39

## 2024-10-14 RX ADMIN — METHYLPREDNISOLONE ACETATE 40 MILLIGRAM(S): 80 INJECTION, SUSPENSION INTRALESIONAL; INTRAMUSCULAR; INTRASYNOVIAL; SOFT TISSUE at 13:16

## 2024-10-14 RX ADMIN — Medication 200 MILLIGRAM(S): at 09:34

## 2024-10-14 RX ADMIN — LEVETIRACETAM 750 MILLIGRAM(S): 500 TABLET, FILM COATED ORAL at 17:38

## 2024-10-14 NOTE — PROGRESS NOTE ADULT - ASSESSMENT
ASSESSMENT/PLAN:  79F w/ PMHx of MDS, DM, HTN, CKD presents after fall at home, found to have R SDH.   s/p 3 s/p crani and hematoma evacuation and MMA embolization     NEURO:  q4h neurochecks  repeat cth 10/10/24- stable - R Holo acute SDH - 4.5 R---> L shift   NSGY following  Activity: [X] Bed rest     PULM: Resp failure secondary to neuro injury   - Weaning parameters  -- Solumedrol 40mg IV q8 for 24 hours   extubated to hfnc/bipap on 10/13  c/w diuresis, nebs/steroids  HOB>30  Aspiration precautions    CV:   MAP>65, sbp <150  Nicardipine  c/w norvasc  starting labetalol (instead of ACEi)  Takes Lisinopril (held for hyperK 4.7- 5.0 )   echo    TTE Echo Complete w/o Contrast w/ Doppler (10.09.24 @ 12:41) >  Summary:   1. Technically limited study.   2. Normal global left ventricular systolic function.   3. Left ventricular ejection fraction, by visual estimation, is 65 to   70%.   4. The left ventricular diastolic function could not be assessed in this   study.   5. Normal right ventricular size and function.   6. Mildly enlarged left atrium.  7. Sclerotic aortic valve with normal opening.   8. Mild tricuspid regurgitation.   9. Normal pulmonary artery pressure.      RENAL: CKD- stable / Hyperphostemia/ Metabolic acidosis secondary to CKD /hyperK  - H2Co3  650mg q 8  intermittent diuresis ; goal negative 500 cc for shift   lokelma initiated  bmp q 8  - Will monitor electrolytes   stable MARCO on CKD  ocampo placed on 10/12    GI:  dysphagia/swallow  GI prophylaxis [] not indicated [] PPI [x] pepcid for 24 hours while on steroids    Bowel regimen [x] miralax [x] senna [] other:  LBM - pending     ENDO:  Prediabetic    Goal -180   Monitor FS - HISS   Hold glimepiride, jardiance, pioglitazone  a1c- 6.0   TSH- 2.68     HEME/ONC:  MDS   VTE prophylaxis: [x] SCDs [ [x] heparin 5 K q 12 SQ   venous dopplers b/l LEs- neg - 10/9/24   MDS hx (follows with Dr Wade)- - On revlimid and Vidaza as OPT previously NOW on Lusaracept.     ID:  low grade temp  obtain endotracheal culture if available  cxr on 10/12 with ?perihilar opacity on L mid lung zone   Cultures neg  PCT on 10/9 is 7.3--> repeat testing  UA negative  MRSA +- bactroban per protocol      SOCIAL/FAMILY:  [x] updated at bedside and over phone    PIV     CODE STATUS:  [x] Full Code     DISPOSITION:  [x] ICU

## 2024-10-14 NOTE — PROGRESS NOTE ADULT - SUBJECTIVE AND OBJECTIVE BOX
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 Per pt's daughter, pt was LKW - 10/7/24  evening. Daughter states this morning pt was not answering the phone and did not open the door for her home health aide. Daughter came to home and found her on the floor, pt was conscious and told daughter "I fell". On initial arrival to ED pt only EO to pain, not following commands, WD extremities. Underwent CTH showing R hemispheric SDH 2.2cm w/ mass effect and 1.1cm MLS. On reassessment in ED pt opening eyes to voice, answering some questions and following simple commands. SBP after CT in 200s, started on Nicardipine gtt. Received 1g IV Keppra, 1850mL LR bolus, and was started on insulin gtt for BP in 400s. Pt assessed by NSGY, going to OR for evacuation. Admitted to NSICU for further management.    Overnight - agitation requiring 1:1; pt on bipap overnight- was diuresed, given nebs/steroids; this AM pt removed her own IVs while on precedex    On admission   GCS 15       - s/p craniotomy and hematoma evacuation with improving MLS c/b by postop mild R SD recollection               S/P 4 U PRBC / i FFP/i PLT               Intubated    S/P MMA      VITALS: [x] Reviewed      EXAMINATION:  General: WN/WD, no acute distress, hypophonic; on hfnc and satup and comfortable   HENT: NC/AT, moist oral mucosa  Eyes: Anicteric sclerae  Cardiac: [id number]  Lungs: Clear  Abdomen: Soft, non-tender, +BS  Extremities: mild UE edema  Skin/Incision Site: Clean, dry and intact  Neurologic: awake alert oriented x 3, hypophonic, ?dysarthric, ?facial, midline gaze, perrl, b/l UEs no drift and AG and b/l LEs no drift and AG, follows all commands         ICU Vital Signs Last 24 Hrs  T(C): 37.7 (14 Oct 2024 00:00), Max: 37.7 (14 Oct 2024 00:00)  T(F): 99.9 (14 Oct 2024 00:00), Max: 99.9 (14 Oct 2024 00:00)  HR: 86 (14 Oct 2024 07:35) (74 - 122)  BP: 130/60 (14 Oct 2024 06:00) (110/56 - 140/64)  BP(mean): 87 (14 Oct 2024 06:00) (78 - 92)  ABP: 123/40 (14 Oct 2024 07:00) (113/43 - 180/62)  ABP(mean): 68 (14 Oct 2024 07:00) (58 - 100)  RR: 14 (14 Oct 2024 09:24) (10 - 113)  SpO2: 100% (14 Oct 2024 09:24) (71% - 100%)      10-13-24 @ 07:01  -  10-14-24 @ 07:00  --------------------------------------------------------  IN: 1213.1 mL / OUT: 1850 mL / NET: -636.9 mL            albuterol/ipratropium for Nebulization 3 milliLiter(s) Nebulizer every 4 hours  amLODIPine   Tablet 10 milliGRAM(s) Oral daily  chlorhexidine 0.12% Liquid 15 milliLiter(s) Oral Mucosa every 12 hours  chlorhexidine 2% Cloths 1 Application(s) Topical <User Schedule>  dexMEDEtomidine Infusion 0.5 MICROgram(s)/kG/Hr (7.5 mL/Hr) IV Continuous <Continuous>  dextrose 50% Injectable 50 milliLiter(s) IV Push every 15 minutes  dextrose 50% Injectable 25 milliLiter(s) IV Push every 15 minutes  dextrose 50% Injectable 25 milliLiter(s) IV Push every 15 minutes  famotidine    Tablet 20 milliGRAM(s) Oral daily  heparin   Injectable 5000 Unit(s) SubCutaneous every 12 hours  insulin lispro (ADMELOG) corrective regimen sliding scale   SubCutaneous every 6 hours  labetalol 200 milliGRAM(s) Oral once  labetalol Injectable 10 milliGRAM(s) IV Push every 3 hours PRN  levETIRAcetam  IVPB 750 milliGRAM(s) IV Intermittent every 12 hours  methylPREDNISolone sodium succinate Injectable 40 milliGRAM(s) IV Push every 8 hours  niCARdipine Infusion 5 mG/Hr (25 mL/Hr) IV Continuous <Continuous>  polyethylene glycol 3350 17 Gram(s) Oral daily  senna 2 Tablet(s) Oral at bedtime  sodium bicarbonate 650 milliGRAM(s) Oral every 8 hours      LABS:  Na: 145 (10-14 @ 06:05), 148 (10-13 @ 05:20), 147 (10-12 @ 04:48)  K: 5.2 (10-14 @ 06:05), 4.8 (10-13 @ 05:20), 4.7 (10-12 @ 04:48)  Cl: 122 (10-14 @ 06:05), 119 (10-13 @ 05:20), 119 (10-12 @ 04:48)  CO2: 14 (10-14 @ 06:05), 16 (10-13 @ 05:20), 17 (10-12 @ 04:48)  BUN: 63 (10-14 @ 06:05), 54 (10-13 @ 05:20), 56 (10-12 @ 04:48)  Cr: 1.5 (10-14 @ 06:05), 1.4 (10-13 @ 05:20), 1.5 (10-12 @ 04:48)  Glu: 173(10-14 @ 06:05), 166(10-13 @ 05:20), 168(10-12 @ 04:48)    Hgb: 7.4 (10-14 @ 06:05), 7.4 (10-13 @ 05:20), 7.3 (10-12 @ 04:48)  Hct: 23.1 (10-14 @ 06:05), 22.8 (10-13 @ 05:20), 22.0 (10-12 @ 04:48)  WBC: 18.26 (10-14 @ 06:05), 18.93 (10-13 @ 05:20), 13.00 (10-12 @ 04:48)  Plt: 304 (10-14 @ 06:05), 272 (10-13 @ 05:20), 266 (10-12 @ 04:48)    INR:   PTT:           LIVER FUNCTIONS - ( 14 Oct 2024 06:05 )  Alb: 4.0 g/dL / Pro: 5.5 g/dL / ALK PHOS: 98 U/L / ALT: 17 U/L / AST: 22 U/L / GGT: x           ABG - ( 14 Oct 2024 06:05 )  pH, Arterial: 7.26  pH, Blood: x     /  pCO2: 30    /  pO2: 168   / HCO3: 14    / Base Excess: -12.2 /  SaO2: 99.0

## 2024-10-15 LAB
ALBUMIN SERPL ELPH-MCNC: 3.4 G/DL — LOW (ref 3.5–5.2)
ALP SERPL-CCNC: 84 U/L — SIGNIFICANT CHANGE UP (ref 30–115)
ALT FLD-CCNC: 23 U/L — SIGNIFICANT CHANGE UP (ref 0–41)
ANION GAP SERPL CALC-SCNC: 12 MMOL/L — SIGNIFICANT CHANGE UP (ref 7–14)
ANION GAP SERPL CALC-SCNC: 8 MMOL/L — SIGNIFICANT CHANGE UP (ref 7–14)
AST SERPL-CCNC: 29 U/L — SIGNIFICANT CHANGE UP (ref 0–41)
BASOPHILS # BLD AUTO: 0 K/UL — SIGNIFICANT CHANGE UP (ref 0–0.2)
BASOPHILS NFR BLD AUTO: 0 % — SIGNIFICANT CHANGE UP (ref 0–1)
BILIRUB SERPL-MCNC: 0.7 MG/DL — SIGNIFICANT CHANGE UP (ref 0.2–1.2)
BLD GP AB SCN SERPL QL: SIGNIFICANT CHANGE UP
BUN SERPL-MCNC: 71 MG/DL — CRITICAL HIGH (ref 10–20)
BUN SERPL-MCNC: 73 MG/DL — CRITICAL HIGH (ref 10–20)
CALCIUM SERPL-MCNC: 8.5 MG/DL — SIGNIFICANT CHANGE UP (ref 8.4–10.4)
CALCIUM SERPL-MCNC: 8.5 MG/DL — SIGNIFICANT CHANGE UP (ref 8.4–10.5)
CHLORIDE SERPL-SCNC: 117 MMOL/L — HIGH (ref 98–110)
CHLORIDE SERPL-SCNC: 117 MMOL/L — HIGH (ref 98–110)
CO2 SERPL-SCNC: 16 MMOL/L — LOW (ref 17–32)
CO2 SERPL-SCNC: 17 MMOL/L — SIGNIFICANT CHANGE UP (ref 17–32)
CREAT SERPL-MCNC: 1.6 MG/DL — HIGH (ref 0.7–1.5)
CREAT SERPL-MCNC: 1.7 MG/DL — HIGH (ref 0.7–1.5)
EGFR: 30 ML/MIN/1.73M2 — LOW
EGFR: 33 ML/MIN/1.73M2 — LOW
EOSINOPHIL # BLD AUTO: 0 K/UL — SIGNIFICANT CHANGE UP (ref 0–0.7)
EOSINOPHIL NFR BLD AUTO: 0 % — SIGNIFICANT CHANGE UP (ref 0–8)
GAS PNL BLDA: SIGNIFICANT CHANGE UP
GLUCOSE BLDC GLUCOMTR-MCNC: 188 MG/DL — HIGH (ref 70–99)
GLUCOSE BLDC GLUCOMTR-MCNC: 189 MG/DL — HIGH (ref 70–99)
GLUCOSE BLDC GLUCOMTR-MCNC: 408 MG/DL — HIGH (ref 70–99)
GLUCOSE BLDC GLUCOMTR-MCNC: 438 MG/DL — HIGH (ref 70–99)
GLUCOSE SERPL-MCNC: 139 MG/DL — HIGH (ref 70–99)
GLUCOSE SERPL-MCNC: 143 MG/DL — HIGH (ref 70–99)
HCT VFR BLD CALC: 19.6 % — LOW (ref 37–47)
HCT VFR BLD CALC: 20.1 % — LOW (ref 37–47)
HCT VFR BLD CALC: 21.1 % — LOW (ref 37–47)
HGB BLD-MCNC: 6.3 G/DL — CRITICAL LOW (ref 12–16)
HGB BLD-MCNC: 6.6 G/DL — CRITICAL LOW (ref 12–16)
HGB BLD-MCNC: 6.8 G/DL — CRITICAL LOW (ref 12–16)
IMM GRANULOCYTES NFR BLD AUTO: 1 % — HIGH (ref 0.1–0.3)
IMM GRANULOCYTES NFR BLD AUTO: 1 % — HIGH (ref 0.1–0.3)
IMM GRANULOCYTES NFR BLD AUTO: 1.7 % — HIGH (ref 0.1–0.3)
LYMPHOCYTES # BLD AUTO: 0.07 K/UL — LOW (ref 1.2–3.4)
LYMPHOCYTES # BLD AUTO: 0.15 K/UL — LOW (ref 1.2–3.4)
LYMPHOCYTES # BLD AUTO: 0.19 K/UL — LOW (ref 1.2–3.4)
LYMPHOCYTES # BLD AUTO: 1 % — LOW (ref 20.5–51.1)
LYMPHOCYTES # BLD AUTO: 1.7 % — LOW (ref 20.5–51.1)
LYMPHOCYTES # BLD AUTO: 2.4 % — LOW (ref 20.5–51.1)
MAGNESIUM SERPL-MCNC: 2.4 MG/DL — SIGNIFICANT CHANGE UP (ref 1.8–2.4)
MCHC RBC-ENTMCNC: 30 PG — SIGNIFICANT CHANGE UP (ref 27–31)
MCHC RBC-ENTMCNC: 30.1 PG — SIGNIFICANT CHANGE UP (ref 27–31)
MCHC RBC-ENTMCNC: 30.6 PG — SIGNIFICANT CHANGE UP (ref 27–31)
MCHC RBC-ENTMCNC: 32.1 G/DL — SIGNIFICANT CHANGE UP (ref 32–37)
MCHC RBC-ENTMCNC: 32.2 G/DL — SIGNIFICANT CHANGE UP (ref 32–37)
MCHC RBC-ENTMCNC: 32.8 G/DL — SIGNIFICANT CHANGE UP (ref 32–37)
MCV RBC AUTO: 93 FL — SIGNIFICANT CHANGE UP (ref 81–99)
MCV RBC AUTO: 93.1 FL — SIGNIFICANT CHANGE UP (ref 81–99)
MCV RBC AUTO: 93.8 FL — SIGNIFICANT CHANGE UP (ref 81–99)
MONOCYTES # BLD AUTO: 0.35 K/UL — SIGNIFICANT CHANGE UP (ref 0.1–0.6)
MONOCYTES # BLD AUTO: 0.42 K/UL — SIGNIFICANT CHANGE UP (ref 0.1–0.6)
MONOCYTES # BLD AUTO: 0.43 K/UL — SIGNIFICANT CHANGE UP (ref 0.1–0.6)
MONOCYTES NFR BLD AUTO: 4.4 % — SIGNIFICANT CHANGE UP (ref 1.7–9.3)
MONOCYTES NFR BLD AUTO: 4.8 % — SIGNIFICANT CHANGE UP (ref 1.7–9.3)
MONOCYTES NFR BLD AUTO: 5.9 % — SIGNIFICANT CHANGE UP (ref 1.7–9.3)
NEUTROPHILS # BLD AUTO: 6.64 K/UL — HIGH (ref 1.4–6.5)
NEUTROPHILS # BLD AUTO: 7.26 K/UL — HIGH (ref 1.4–6.5)
NEUTROPHILS # BLD AUTO: 8.02 K/UL — HIGH (ref 1.4–6.5)
NEUTROPHILS NFR BLD AUTO: 91.4 % — HIGH (ref 42.2–75.2)
NEUTROPHILS NFR BLD AUTO: 92.2 % — HIGH (ref 42.2–75.2)
NEUTROPHILS NFR BLD AUTO: 92.5 % — HIGH (ref 42.2–75.2)
NRBC # BLD: 0 /100 WBCS — SIGNIFICANT CHANGE UP (ref 0–0)
PHOSPHATE SERPL-MCNC: 4.6 MG/DL — SIGNIFICANT CHANGE UP (ref 2.1–4.9)
PLATELET # BLD AUTO: 192 K/UL — SIGNIFICANT CHANGE UP (ref 130–400)
PLATELET # BLD AUTO: 196 K/UL — SIGNIFICANT CHANGE UP (ref 130–400)
PLATELET # BLD AUTO: 204 K/UL — SIGNIFICANT CHANGE UP (ref 130–400)
PMV BLD: 11 FL — HIGH (ref 7.4–10.4)
PMV BLD: 11.1 FL — HIGH (ref 7.4–10.4)
PMV BLD: 11.1 FL — HIGH (ref 7.4–10.4)
POTASSIUM SERPL-MCNC: 4.6 MMOL/L — SIGNIFICANT CHANGE UP (ref 3.5–5)
POTASSIUM SERPL-MCNC: 5 MMOL/L — SIGNIFICANT CHANGE UP (ref 3.5–5)
POTASSIUM SERPL-SCNC: 4.6 MMOL/L — SIGNIFICANT CHANGE UP (ref 3.5–5)
POTASSIUM SERPL-SCNC: 5 MMOL/L — SIGNIFICANT CHANGE UP (ref 3.5–5)
PROT SERPL-MCNC: 4.7 G/DL — LOW (ref 6–8)
RBC # BLD: 2.09 M/UL — LOW (ref 4.2–5.4)
RBC # BLD: 2.16 M/UL — LOW (ref 4.2–5.4)
RBC # BLD: 2.27 M/UL — LOW (ref 4.2–5.4)
RBC # FLD: 16.2 % — HIGH (ref 11.5–14.5)
RBC # FLD: 16.4 % — HIGH (ref 11.5–14.5)
RBC # FLD: 16.6 % — HIGH (ref 11.5–14.5)
SODIUM SERPL-SCNC: 142 MMOL/L — SIGNIFICANT CHANGE UP (ref 135–146)
SODIUM SERPL-SCNC: 145 MMOL/L — SIGNIFICANT CHANGE UP (ref 135–146)
SURGICAL PATHOLOGY STUDY: SIGNIFICANT CHANGE UP
WBC # BLD: 7.26 K/UL — SIGNIFICANT CHANGE UP (ref 4.8–10.8)
WBC # BLD: 7.88 K/UL — SIGNIFICANT CHANGE UP (ref 4.8–10.8)
WBC # BLD: 8.68 K/UL — SIGNIFICANT CHANGE UP (ref 4.8–10.8)
WBC # FLD AUTO: 7.26 K/UL — SIGNIFICANT CHANGE UP (ref 4.8–10.8)
WBC # FLD AUTO: 7.88 K/UL — SIGNIFICANT CHANGE UP (ref 4.8–10.8)
WBC # FLD AUTO: 8.68 K/UL — SIGNIFICANT CHANGE UP (ref 4.8–10.8)

## 2024-10-15 PROCEDURE — 93971 EXTREMITY STUDY: CPT | Mod: 26,LT

## 2024-10-15 PROCEDURE — 99233 SBSQ HOSP IP/OBS HIGH 50: CPT | Mod: GC

## 2024-10-15 RX ORDER — OLANZAPINE 20 MG/1
2.5 TABLET ORAL EVERY 4 HOURS
Refills: 0 | Status: DISCONTINUED | OUTPATIENT
Start: 2024-10-15 | End: 2024-10-18

## 2024-10-15 RX ORDER — IPRATROPIUM BROMIDE AND ALBUTEROL SULFATE .5; 2.5 MG/3ML; MG/3ML
3 SOLUTION RESPIRATORY (INHALATION) EVERY 6 HOURS
Refills: 0 | Status: DISCONTINUED | OUTPATIENT
Start: 2024-10-15 | End: 2024-10-17

## 2024-10-15 RX ORDER — INSULIN LISPRO 100/ML
7 VIAL (ML) SUBCUTANEOUS
Refills: 0 | Status: DISCONTINUED | OUTPATIENT
Start: 2024-10-15 | End: 2024-10-16

## 2024-10-15 RX ORDER — FUROSEMIDE 40 MG
40 TABLET ORAL DAILY
Refills: 0 | Status: DISCONTINUED | OUTPATIENT
Start: 2024-10-15 | End: 2024-10-18

## 2024-10-15 RX ORDER — LEVETIRACETAM 500 MG/1
500 TABLET, FILM COATED ORAL
Refills: 0 | Status: DISCONTINUED | OUTPATIENT
Start: 2024-10-15 | End: 2024-10-27

## 2024-10-15 RX ADMIN — LEVETIRACETAM 750 MILLIGRAM(S): 500 TABLET, FILM COATED ORAL at 06:17

## 2024-10-15 RX ADMIN — POLYETHYLENE GLYCOL 3350 17 GRAM(S): 17 POWDER, FOR SOLUTION ORAL at 11:41

## 2024-10-15 RX ADMIN — Medication 10: at 12:12

## 2024-10-15 RX ADMIN — Medication 10: at 17:44

## 2024-10-15 RX ADMIN — Medication 100 MILLIGRAM(S): at 13:33

## 2024-10-15 RX ADMIN — Medication 1300 MILLIGRAM(S): at 21:52

## 2024-10-15 RX ADMIN — MUPIROCIN 1 APPLICATION(S): 20 OINTMENT TOPICAL at 06:18

## 2024-10-15 RX ADMIN — Medication 7 UNIT(S): at 17:44

## 2024-10-15 RX ADMIN — Medication 100 MILLIGRAM(S): at 21:52

## 2024-10-15 RX ADMIN — METHYLPREDNISOLONE ACETATE 40 MILLIGRAM(S): 80 INJECTION, SUSPENSION INTRALESIONAL; INTRAMUSCULAR; INTRASYNOVIAL; SOFT TISSUE at 06:17

## 2024-10-15 RX ADMIN — CHLORHEXIDINE GLUCONATE 1 APPLICATION(S): 40 SOLUTION TOPICAL at 06:18

## 2024-10-15 RX ADMIN — Medication 1300 MILLIGRAM(S): at 13:33

## 2024-10-15 RX ADMIN — OLANZAPINE 2.5 MILLIGRAM(S): 20 TABLET ORAL at 21:37

## 2024-10-15 RX ADMIN — Medication 10 MILLIGRAM(S): at 06:17

## 2024-10-15 RX ADMIN — MUPIROCIN 1 APPLICATION(S): 20 OINTMENT TOPICAL at 17:50

## 2024-10-15 RX ADMIN — Medication 2: at 06:42

## 2024-10-15 RX ADMIN — SODIUM ZIRCONIUM CYCLOSILICATE 5 GRAM(S): 10 POWDER, FOR SUSPENSION ORAL at 11:41

## 2024-10-15 RX ADMIN — Medication 4 UNIT(S): at 12:13

## 2024-10-15 RX ADMIN — IPRATROPIUM BROMIDE AND ALBUTEROL SULFATE 3 MILLILITER(S): .5; 2.5 SOLUTION RESPIRATORY (INHALATION) at 20:11

## 2024-10-15 RX ADMIN — Medication 1300 MILLIGRAM(S): at 06:17

## 2024-10-15 RX ADMIN — Medication 2: at 23:48

## 2024-10-15 RX ADMIN — Medication 40 MILLIGRAM(S): at 11:42

## 2024-10-15 RX ADMIN — Medication 2 TABLET(S): at 21:52

## 2024-10-15 RX ADMIN — IPRATROPIUM BROMIDE AND ALBUTEROL SULFATE 3 MILLILITER(S): .5; 2.5 SOLUTION RESPIRATORY (INHALATION) at 09:18

## 2024-10-15 RX ADMIN — Medication 100 MILLIGRAM(S): at 06:17

## 2024-10-15 RX ADMIN — LEVETIRACETAM 500 MILLIGRAM(S): 500 TABLET, FILM COATED ORAL at 17:49

## 2024-10-15 RX ADMIN — HEPARIN SODIUM 5000 UNIT(S): 10000 INJECTION INTRAVENOUS; SUBCUTANEOUS at 17:50

## 2024-10-15 RX ADMIN — Medication 10 MILLIGRAM(S): at 23:48

## 2024-10-15 NOTE — PROGRESS NOTE ADULT - SUBJECTIVE AND OBJECTIVE BOX
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 Per pt's daughter, pt was LKW - 10/7/24  evening. Daughter states this morning pt was not answering the phone and did not open the door for her home health aide. Daughter came to home and found her on the floor, pt was conscious and told daughter "I fell". On initial arrival to ED pt only EO to pain, not following commands, WD extremities. Underwent CTH showing R hemispheric SDH 2.2cm w/ mass effect and 1.1cm MLS. On reassessment in ED pt opening eyes to voice, answering some questions and following simple commands. SBP after CT in 200s, started on Nicardipine gtt. Received 1g IV Keppra, 1850mL LR bolus, and was started on insulin gtt for BP in 400s. Pt assessed by NSGY, going to OR for evacuation. Admitted to NSICU for further management.    Overnight - stable exam and respiratory status    On admission   GCS 15       - s/p craniotomy and hematoma evacuation with improving MLS c/b by postop mild R SD recollection               S/P 4 U PRBC / i FFP/i PLT               Intubated    S/P MMA      VITALS: [x] Reviewed      EXAMINATION:  General: WN/WD, no acute distress, hypophonic; on hfnc and satup and comfortable   HENT: NC/AT, moist oral mucosa  Eyes: Anicteric sclerae  Cardiac: [id number]  Lungs: Clear  Abdomen: Soft, non-tender, +BS  Extremities: +1 UE/LE edema  Skin/Incision Site: Clean, dry and intact  Neurologic: awake alert oriented x 3, hypophonic, dysarthric, mild facial, midline gaze, perrl, b/l UEs no drift and AG and b/l LEs no drift and AG, follows all commands         ICU Vital Signs Last 24 Hrs  T(C): 36.9 (15 Oct 2024 04:00), Max: 36.9 (15 Oct 2024 04:00)  T(F): 98.5 (15 Oct 2024 04:00), Max: 98.5 (15 Oct 2024 04:00)  HR: 66 (15 Oct 2024 08:00) (66 - 82)  BP: --  BP(mean): --  ABP: 123/39 (15 Oct 2024 08:00) (123/39 - 153/48)  ABP(mean): 70 (15 Oct 2024 08:00) (70 - 91)  RR: 26 (15 Oct 2024 08:00) (5 - 26)  SpO2: 100% (15 Oct 2024 08:00) (94% - 100%)      10-14-24 @ 07:01  -  10-15-24 @ 07:00  --------------------------------------------------------  IN: 540 mL / OUT: 1390 mL / NET: -850 mL    10-15-24 @ 07:01  -  10-15-24 @ 09:36  --------------------------------------------------------  IN: 3.2 mL / OUT: 60 mL / NET: -56.8 mL            acetaminophen     Tablet .. 975 milliGRAM(s) Oral every 6 hours PRN  albuterol/ipratropium for Nebulization 3 milliLiter(s) Nebulizer every 4 hours  amLODIPine   Tablet 10 milliGRAM(s) Oral daily  chlorhexidine 2% Cloths 1 Application(s) Topical <User Schedule>  dexMEDEtomidine Infusion 0.5 MICROgram(s)/kG/Hr (7.5 mL/Hr) IV Continuous <Continuous>  dextrose 50% Injectable 50 milliLiter(s) IV Push every 15 minutes  dextrose 50% Injectable 25 milliLiter(s) IV Push every 15 minutes  dextrose 50% Injectable 25 milliLiter(s) IV Push every 15 minutes  heparin   Injectable 5000 Unit(s) SubCutaneous every 12 hours  insulin lispro (ADMELOG) corrective regimen sliding scale   SubCutaneous every 6 hours  insulin lispro Injectable (ADMELOG) 4 Unit(s) SubCutaneous three times a day with meals  labetalol 100 milliGRAM(s) Oral every 8 hours  labetalol Injectable 10 milliGRAM(s) IV Push every 3 hours PRN  levETIRAcetam 750 milliGRAM(s) Oral two times a day  mupirocin 2% Ointment 1 Application(s) Topical every 12 hours  niCARdipine Infusion 5 mG/Hr (25 mL/Hr) IV Continuous <Continuous>  polyethylene glycol 3350 17 Gram(s) Oral daily  senna 2 Tablet(s) Oral at bedtime  sodium bicarbonate 1300 milliGRAM(s) Oral every 8 hours  sodium zirconium cyclosilicate 5 Gram(s) Oral every 24 hours  traMADol 25 milliGRAM(s) Oral every 6 hours PRN  traMADol 50 milliGRAM(s) Oral every 6 hours PRN      LABS:  Na: 142 (10-15 @ 04:35), 145 (10-14 @ 23:05), 144 (10-14 @ 21:17), 148 (10-14 @ 10:42), 145 (10-14 @ 06:05), 148 (10-13 @ 05:20)  K: 4.6 (10-15 @ 04:35), 5.0 (10-14 @ 23:05), 4.4 (10-14 @ 21:17), 5.2 (10-14 @ 10:42), 5.2 (10-14 @ 06:05), 4.8 (10-13 @ 05:20)  Cl: 117 (10-15 @ 04:35), 117 (10-14 @ 23:05), 115 (10-14 @ 21:17), 119 (10-14 @ 10:42), 122 (10-14 @ 06:05), 119 (10-13 @ 05:20)  CO2: 17 (10-15 @ 04:35), 16 (10-14 @ 23:05), 15 (10-14 @ 21:17), 12 (10-14 @ 10:42), 14 (10-14 @ 06:05), 16 (10-13 @ 05:20)  BUN: 71 (10-15 @ 04:35), 73 (10-14 @ 23:05), 73 (10-14 @ 21:17), 67 (10-14 @ 10:42), 63 (10-14 @ 06:05), 54 (10-13 @ 05:20)  Cr: 1.6 (10-15 @ 04:35), 1.7 (10-14 @ 23:05), 1.7 (10-14 @ 21:17), 1.5 (10-14 @ 10:42), 1.5 (10-14 @ 06:05), 1.4 (10-13 @ 05:20)  Glu: 139(10-15 @ 04:35), 143(10-14 @ 23:05), 166(10-14 @ 21:17), 263(10-14 @ 10:42), 173(10-14 @ 06:05), 166(10-13 @ 05:20)    Hgb: 6.6 (10-15 @ 06:25), 6.3 (10-15 @ 04:35), 7.4 (10-14 @ 06:05), 7.4 (10-13 @ 05:20)  Hct: 20.1 (10-15 @ 06:25), 19.6 (10-15 @ 04:35), 23.1 (10-14 @ 06:05), 22.8 (10-13 @ 05:20)  WBC: 8.68 (10-15 @ 06:25), 7.88 (10-15 @ 04:35), 18.26 (10-14 @ 06:05), 18.93 (10-13 @ 05:20)  Plt: 204 (10-15 @ 06:25), 196 (10-15 @ 04:35), 304 (10-14 @ 06:05), 272 (10-13 @ 05:20)    INR:   PTT:           LIVER FUNCTIONS - ( 15 Oct 2024 04:35 )  Alb: 3.4 g/dL / Pro: 4.7 g/dL / ALK PHOS: 84 U/L / ALT: 23 U/L / AST: 29 U/L / GGT: x           ABG - ( 14 Oct 2024 12:27 )  pH, Arterial: 7.27  pH, Blood: x     /  pCO2: 25    /  pO2: 227   / HCO3: 12    / Base Excess: -13.6 /  SaO2: 99.2

## 2024-10-15 NOTE — PROGRESS NOTE ADULT - ASSESSMENT
ASSESSMENT/PLAN:  79F w/ PMHx of MDS, DM, HTN, CKD presents after fall at home, found to have R SDH.   s/p 3 s/p crani and hematoma evacuation and MMA embolization     NEURO:  q4h neurochecks  repeat cth 10/10/24- stable - R Holo acute SDH - 4.5 R---> L shift   NSGY following  analgesia- tylenol and tramadol  Activity: [X] oobtc with assist if not agitated    PULM: Resp failure secondary to neuro injury   wean hfnc to LFNC; obtain abg   s/p solumedrol IV   extubated to hfnc/bipap on 10/13  c/w lasix, duonebs q6  HOB>30  Aspiration precautions    CV: htn, pulmonary edema   MAP>65, sbp <150  Nicardipine  c/w norvasc at hbome dose  continue labetalol (instead of ACEi) which was started inpatient   Takes Lisinopril (held for hyperK 4.7- 5.0 )   lasix initiated for pulmonary edema- can dc once no longer indicated  echo    TTE Echo Complete w/o Contrast w/ Doppler (10.09.24 @ 12:41) >  Summary:   1. Technically limited study.   2. Normal global left ventricular systolic function.   3. Left ventricular ejection fraction, by visual estimation, is 65 to   70%.   4. The left ventricular diastolic function could not be assessed in this   study.   5. Normal right ventricular size and function.   6. Mildly enlarged left atrium.  7. Sclerotic aortic valve with normal opening.   8. Mild tricuspid regurgitation.   9. Normal pulmonary artery pressure.      RENAL: CKD-  AGMA (MARCO on cKD)/NAGMA ((hyperchloremia)  / Hyperphostemia/ Metabolic acidosis secondary to CKD /hyperK  - H2Co3 1300 mg q8  _ can deescalate to home dose 650 mg q 8 as pt acid-base status improves  c/w po diuresis   lokelma initiated (pt on [facility] outpatient)- qd-- can hold for hypokalemia  - Will monitor electrolytes   ocampo placed on 10/12- dc today    GI:  dysphagia/swallow- advance diet per swallow eval  GI prophylaxis [x] not indicated [] PPI   Bowel regimen [x] miralax [x] senna [] other:  LBM - 10/14    ENDO:  Prediabetic    ademalog 4 unit ac; ISS ac/hs  Goal -180  now off steroids  Hold glimepiride, jardiance, pioglitazone  a1c- 6.0   TSH- 2.68     HEME/ONC:  MDS   VTE prophylaxis: [x] SCDs [ [x] heparin 5 K q 12 SQ   venous dopplers b/l LEs- neg - 10/9/24   anemia due to MDS as well as worsening renal insufficiency- repeat today, if Hb <7 will transfuse  MDS hx (follows with Dr Wade)- - On revlimid and Vidaza as OPT previously NOW on Lusaracept.     ID:  low grade temp  obtain endotracheal culture if available  cxr on 10/12 with ?perihilar opacity on L mid lung zone   Cultures neg  PCT on 10/9 is 7.3--> repeat 1.04  UA negative  MRSA +- bactroban per protocol      SOCIAL/FAMILY:  [x] updated at bedside and over phone    PIV     CODE STATUS:  [x] Full Code     DISPOSITION:  [x] ICU

## 2024-10-16 LAB
ALBUMIN SERPL ELPH-MCNC: 3.6 G/DL — SIGNIFICANT CHANGE UP (ref 3.5–5.2)
ALP SERPL-CCNC: 89 U/L — SIGNIFICANT CHANGE UP (ref 30–115)
ALT FLD-CCNC: 28 U/L — SIGNIFICANT CHANGE UP (ref 0–41)
ANION GAP SERPL CALC-SCNC: 10 MMOL/L — SIGNIFICANT CHANGE UP (ref 7–14)
APPEARANCE CSF: ABNORMAL
APPEARANCE SPUN FLD: SIGNIFICANT CHANGE UP
AST SERPL-CCNC: 21 U/L — SIGNIFICANT CHANGE UP (ref 0–41)
BASOPHILS # BLD AUTO: 0 K/UL — SIGNIFICANT CHANGE UP (ref 0–0.2)
BASOPHILS # BLD AUTO: 0.02 K/UL — SIGNIFICANT CHANGE UP (ref 0–0.2)
BASOPHILS NFR BLD AUTO: 0 % — SIGNIFICANT CHANGE UP (ref 0–1)
BASOPHILS NFR BLD AUTO: 0.2 % — SIGNIFICANT CHANGE UP (ref 0–1)
BILIRUB SERPL-MCNC: 0.9 MG/DL — SIGNIFICANT CHANGE UP (ref 0.2–1.2)
BUN SERPL-MCNC: 70 MG/DL — CRITICAL HIGH (ref 10–20)
CALCIUM SERPL-MCNC: 8.7 MG/DL — SIGNIFICANT CHANGE UP (ref 8.4–10.4)
CHLORIDE SERPL-SCNC: 114 MMOL/L — HIGH (ref 98–110)
CO2 SERPL-SCNC: 20 MMOL/L — SIGNIFICANT CHANGE UP (ref 17–32)
COLOR CSF: SIGNIFICANT CHANGE UP
CREAT SERPL-MCNC: 1.5 MG/DL — SIGNIFICANT CHANGE UP (ref 0.7–1.5)
CSF PCR RESULT: SIGNIFICANT CHANGE UP
EGFR: 35 ML/MIN/1.73M2 — LOW
EOSINOPHIL # BLD AUTO: 0 K/UL — SIGNIFICANT CHANGE UP (ref 0–0.7)
EOSINOPHIL # BLD AUTO: 0.01 K/UL — SIGNIFICANT CHANGE UP (ref 0–0.7)
EOSINOPHIL NFR BLD AUTO: 0 % — SIGNIFICANT CHANGE UP (ref 0–8)
EOSINOPHIL NFR BLD AUTO: 0.1 % — SIGNIFICANT CHANGE UP (ref 0–8)
GAS PNL BLDA: SIGNIFICANT CHANGE UP
GAS PNL BLDA: SIGNIFICANT CHANGE UP
GLUCOSE BLDC GLUCOMTR-MCNC: 121 MG/DL — HIGH (ref 70–99)
GLUCOSE BLDC GLUCOMTR-MCNC: 160 MG/DL — HIGH (ref 70–99)
GLUCOSE BLDC GLUCOMTR-MCNC: 169 MG/DL — HIGH (ref 70–99)
GLUCOSE BLDC GLUCOMTR-MCNC: 72 MG/DL — SIGNIFICANT CHANGE UP (ref 70–99)
GLUCOSE BLDC GLUCOMTR-MCNC: 79 MG/DL — SIGNIFICANT CHANGE UP (ref 70–99)
GLUCOSE CSF-MCNC: 61 MG/DL — SIGNIFICANT CHANGE UP (ref 45–75)
GLUCOSE SERPL-MCNC: 62 MG/DL — LOW (ref 70–99)
GRAM STN FLD: SIGNIFICANT CHANGE UP
HCT VFR BLD CALC: 20.7 % — LOW (ref 37–47)
HCT VFR BLD CALC: 24.3 % — LOW (ref 37–47)
HCT VFR BLD CALC: 26.2 % — LOW (ref 37–47)
HGB BLD-MCNC: 6.7 G/DL — CRITICAL LOW (ref 12–16)
HGB BLD-MCNC: 8.1 G/DL — LOW (ref 12–16)
HGB BLD-MCNC: 8.8 G/DL — LOW (ref 12–16)
IMM GRANULOCYTES NFR BLD AUTO: 1.3 % — HIGH (ref 0.1–0.3)
IMM GRANULOCYTES NFR BLD AUTO: 3 % — HIGH (ref 0.1–0.3)
LYMPHOCYTES # BLD AUTO: 0.19 K/UL — LOW (ref 1.2–3.4)
LYMPHOCYTES # BLD AUTO: 0.39 K/UL — LOW (ref 1.2–3.4)
LYMPHOCYTES # BLD AUTO: 2.4 % — LOW (ref 20.5–51.1)
LYMPHOCYTES # BLD AUTO: 3.7 % — LOW (ref 20.5–51.1)
LYMPHOCYTES # CSF: 3 % — SIGNIFICANT CHANGE UP (ref 40–80)
MAGNESIUM SERPL-MCNC: 2.2 MG/DL — SIGNIFICANT CHANGE UP (ref 1.8–2.4)
MCHC RBC-ENTMCNC: 29.8 PG — SIGNIFICANT CHANGE UP (ref 27–31)
MCHC RBC-ENTMCNC: 30.3 PG — SIGNIFICANT CHANGE UP (ref 27–31)
MCHC RBC-ENTMCNC: 30.6 PG — SIGNIFICANT CHANGE UP (ref 27–31)
MCHC RBC-ENTMCNC: 32.4 G/DL — SIGNIFICANT CHANGE UP (ref 32–37)
MCHC RBC-ENTMCNC: 33.3 G/DL — SIGNIFICANT CHANGE UP (ref 32–37)
MCHC RBC-ENTMCNC: 33.6 G/DL — SIGNIFICANT CHANGE UP (ref 32–37)
MCV RBC AUTO: 91 FL — SIGNIFICANT CHANGE UP (ref 81–99)
MCV RBC AUTO: 91 FL — SIGNIFICANT CHANGE UP (ref 81–99)
MCV RBC AUTO: 92 FL — SIGNIFICANT CHANGE UP (ref 81–99)
MONOCYTES # BLD AUTO: 0.85 K/UL — HIGH (ref 0.1–0.6)
MONOCYTES # BLD AUTO: 0.88 K/UL — HIGH (ref 0.1–0.6)
MONOCYTES NFR BLD AUTO: 10.9 % — HIGH (ref 1.7–9.3)
MONOCYTES NFR BLD AUTO: 8.4 % — SIGNIFICANT CHANGE UP (ref 1.7–9.3)
MONOS+MACROS NFR CSF: 2 % — LOW (ref 15–45)
NEUTROPHILS # BLD AUTO: 6.69 K/UL — HIGH (ref 1.4–6.5)
NEUTROPHILS # BLD AUTO: 8.82 K/UL — HIGH (ref 1.4–6.5)
NEUTROPHILS # CSF: 95 % — SIGNIFICANT CHANGE UP (ref 0–6)
NEUTROPHILS NFR BLD AUTO: 84.6 % — HIGH (ref 42.2–75.2)
NEUTROPHILS NFR BLD AUTO: 85.4 % — HIGH (ref 42.2–75.2)
NRBC # BLD: 0 /100 WBCS — SIGNIFICANT CHANGE UP (ref 0–0)
NRBC NFR CSF: 1775 /UL — HIGH (ref 0–5)
PHOSPHATE SERPL-MCNC: 4.1 MG/DL — SIGNIFICANT CHANGE UP (ref 2.1–4.9)
PLATELET # BLD AUTO: 182 K/UL — SIGNIFICANT CHANGE UP (ref 130–400)
PLATELET # BLD AUTO: 185 K/UL — SIGNIFICANT CHANGE UP (ref 130–400)
PLATELET # BLD AUTO: 187 K/UL — SIGNIFICANT CHANGE UP (ref 130–400)
PMV BLD: 10.8 FL — HIGH (ref 7.4–10.4)
PMV BLD: 11 FL — HIGH (ref 7.4–10.4)
PMV BLD: 11.3 FL — HIGH (ref 7.4–10.4)
POTASSIUM SERPL-MCNC: 4.5 MMOL/L — SIGNIFICANT CHANGE UP (ref 3.5–5)
POTASSIUM SERPL-SCNC: 4.5 MMOL/L — SIGNIFICANT CHANGE UP (ref 3.5–5)
PROT CSF-MCNC: 562 MG/DL — HIGH (ref 15–45)
PROT SERPL-MCNC: 5 G/DL — LOW (ref 6–8)
RBC # BLD: 2.25 M/UL — LOW (ref 4.2–5.4)
RBC # BLD: 2.67 M/UL — LOW (ref 4.2–5.4)
RBC # BLD: 2.88 M/UL — LOW (ref 4.2–5.4)
RBC # CSF: SIGNIFICANT CHANGE UP /UL (ref 0–0)
RBC # FLD: 15.5 % — HIGH (ref 11.5–14.5)
RBC # FLD: 15.9 % — HIGH (ref 11.5–14.5)
RBC # FLD: 16.6 % — HIGH (ref 11.5–14.5)
SODIUM SERPL-SCNC: 144 MMOL/L — SIGNIFICANT CHANGE UP (ref 135–146)
SPECIMEN SOURCE: SIGNIFICANT CHANGE UP
TUBE TYPE: SIGNIFICANT CHANGE UP
WBC # BLD: 10.43 K/UL — SIGNIFICANT CHANGE UP (ref 4.8–10.8)
WBC # BLD: 12.28 K/UL — HIGH (ref 4.8–10.8)
WBC # BLD: 7.83 K/UL — SIGNIFICANT CHANGE UP (ref 4.8–10.8)
WBC # FLD AUTO: 10.43 K/UL — SIGNIFICANT CHANGE UP (ref 4.8–10.8)
WBC # FLD AUTO: 12.28 K/UL — HIGH (ref 4.8–10.8)
WBC # FLD AUTO: 7.83 K/UL — SIGNIFICANT CHANGE UP (ref 4.8–10.8)

## 2024-10-16 PROCEDURE — 70450 CT HEAD/BRAIN W/O DYE: CPT | Mod: 26,77

## 2024-10-16 PROCEDURE — 70450 CT HEAD/BRAIN W/O DYE: CPT | Mod: 26

## 2024-10-16 PROCEDURE — ZZZZZ: CPT

## 2024-10-16 PROCEDURE — 99233 SBSQ HOSP IP/OBS HIGH 50: CPT | Mod: GC

## 2024-10-16 RX ORDER — SODIUM ZIRCONIUM CYCLOSILICATE 10 G/10G
5 POWDER, FOR SUSPENSION ORAL EVERY 24 HOURS
Refills: 0 | Status: DISCONTINUED | OUTPATIENT
Start: 2024-10-16 | End: 2024-10-16

## 2024-10-16 RX ORDER — HYDRALAZINE HYDROCHLORIDE 50 MG/1
5 TABLET, FILM COATED ORAL ONCE
Refills: 0 | Status: COMPLETED | OUTPATIENT
Start: 2024-10-16 | End: 2024-10-16

## 2024-10-16 RX ORDER — SODIUM ZIRCONIUM CYCLOSILICATE 10 G/10G
5 POWDER, FOR SUSPENSION ORAL EVERY 24 HOURS
Refills: 0 | Status: DISCONTINUED | OUTPATIENT
Start: 2024-10-16 | End: 2024-10-18

## 2024-10-16 RX ORDER — HEPARIN SODIUM 10000 [USP'U]/ML
5000 INJECTION INTRAVENOUS; SUBCUTANEOUS EVERY 8 HOURS
Refills: 0 | Status: DISCONTINUED | OUTPATIENT
Start: 2024-10-16 | End: 2024-10-27

## 2024-10-16 RX ORDER — LABETALOL HCL 200 MG
200 TABLET ORAL EVERY 8 HOURS
Refills: 0 | Status: DISCONTINUED | OUTPATIENT
Start: 2024-10-16 | End: 2024-10-27

## 2024-10-16 RX ADMIN — OLANZAPINE 2.5 MILLIGRAM(S): 20 TABLET ORAL at 02:09

## 2024-10-16 RX ADMIN — Medication 2: at 17:53

## 2024-10-16 RX ADMIN — Medication 10 MILLIGRAM(S): at 12:04

## 2024-10-16 RX ADMIN — IPRATROPIUM BROMIDE AND ALBUTEROL SULFATE 3 MILLILITER(S): .5; 2.5 SOLUTION RESPIRATORY (INHALATION) at 20:39

## 2024-10-16 RX ADMIN — MUPIROCIN 1 APPLICATION(S): 20 OINTMENT TOPICAL at 17:00

## 2024-10-16 RX ADMIN — Medication 10 MILLIGRAM(S): at 19:20

## 2024-10-16 RX ADMIN — Medication 1300 MILLIGRAM(S): at 13:55

## 2024-10-16 RX ADMIN — Medication 10 MILLIGRAM(S): at 06:51

## 2024-10-16 RX ADMIN — Medication 200 MILLIGRAM(S): at 13:55

## 2024-10-16 RX ADMIN — HEPARIN SODIUM 5000 UNIT(S): 10000 INJECTION INTRAVENOUS; SUBCUTANEOUS at 13:55

## 2024-10-16 RX ADMIN — OLANZAPINE 2.5 MILLIGRAM(S): 20 TABLET ORAL at 07:07

## 2024-10-16 RX ADMIN — HYDRALAZINE HYDROCHLORIDE 5 MILLIGRAM(S): 50 TABLET, FILM COATED ORAL at 21:28

## 2024-10-16 RX ADMIN — Medication 1300 MILLIGRAM(S): at 05:29

## 2024-10-16 RX ADMIN — HEPARIN SODIUM 5000 UNIT(S): 10000 INJECTION INTRAVENOUS; SUBCUTANEOUS at 05:28

## 2024-10-16 RX ADMIN — Medication 2 TABLET(S): at 22:13

## 2024-10-16 RX ADMIN — IPRATROPIUM BROMIDE AND ALBUTEROL SULFATE 3 MILLILITER(S): .5; 2.5 SOLUTION RESPIRATORY (INHALATION) at 07:29

## 2024-10-16 RX ADMIN — LEVETIRACETAM 500 MILLIGRAM(S): 500 TABLET, FILM COATED ORAL at 17:52

## 2024-10-16 RX ADMIN — POLYETHYLENE GLYCOL 3350 17 GRAM(S): 17 POWDER, FOR SOLUTION ORAL at 14:02

## 2024-10-16 RX ADMIN — Medication 2: at 23:33

## 2024-10-16 RX ADMIN — CHLORHEXIDINE GLUCONATE 1 APPLICATION(S): 40 SOLUTION TOPICAL at 05:29

## 2024-10-16 RX ADMIN — Medication 100 MILLIGRAM(S): at 05:28

## 2024-10-16 RX ADMIN — Medication 1300 MILLIGRAM(S): at 22:13

## 2024-10-16 RX ADMIN — Medication 200 MILLIGRAM(S): at 22:13

## 2024-10-16 RX ADMIN — MUPIROCIN 1 APPLICATION(S): 20 OINTMENT TOPICAL at 05:57

## 2024-10-16 RX ADMIN — IPRATROPIUM BROMIDE AND ALBUTEROL SULFATE 3 MILLILITER(S): .5; 2.5 SOLUTION RESPIRATORY (INHALATION) at 13:38

## 2024-10-16 RX ADMIN — HEPARIN SODIUM 5000 UNIT(S): 10000 INJECTION INTRAVENOUS; SUBCUTANEOUS at 22:13

## 2024-10-16 RX ADMIN — Medication 10 MILLIGRAM(S): at 05:28

## 2024-10-16 RX ADMIN — LEVETIRACETAM 500 MILLIGRAM(S): 500 TABLET, FILM COATED ORAL at 05:28

## 2024-10-16 RX ADMIN — Medication 40 MILLIGRAM(S): at 05:28

## 2024-10-16 NOTE — OCCUPATIONAL THERAPY INITIAL EVALUATION ADULT - RANGE OF MOTION EXAMINATION, UPPER EXTREMITY
Pt with limited engagement impacting assessment, however per functional observation RUE appears WFL, LUE shoulder ~1/4 AROM actively assists througout remainder of ROM, elbow ~1/2 AROM, wrist unable to assess 2* to a line, digits WFL, PROM all WNL, pain at L humerus region during AROM, neurocrit NP Addis made aware, pt with + superficial phlebitis per ultrasound, may order x ray 2* to pt with fall prior to admit

## 2024-10-16 NOTE — OCCUPATIONAL THERAPY INITIAL EVALUATION ADULT - ORIENTATION, REHAB EVAL
grossly to month/year/person/place/situation
(0) No drift; leg holds 30 degree position for full 5 secs

## 2024-10-16 NOTE — SWALLOW BEDSIDE ASSESSMENT ADULT - NS SPL SWALLOW CLINIC TRIAL FT
+toleration
+toleration for thin, mildly thick, puree and easy to chew solids w/o overt s/s of penetration/aspiration

## 2024-10-16 NOTE — OCCUPATIONAL THERAPY INITIAL EVALUATION ADULT - ADL RETRAINING, OT EVAL
Patient will perform upper body dressing with minimal assistance by discharge. Patient will perform lower body dressing with minimal assistance with use of appropriate adaptive equipment as needed by discharge.

## 2024-10-16 NOTE — PHYSICAL THERAPY INITIAL EVALUATION ADULT - PERTINENT HX OF CURRENT PROBLEM, REHAB EVAL
79 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 Per pt's daughter, pt was LKW - 10/7/24  evening. Daughter states this morning pt was not answering the phone and did not open the door for her home health aide. Daughter came to home and found her on the floor, pt was conscious and told daughter "I fell". On initial arrival to ED pt only EO to pain, not following commands, WD extremities. Underwent CTH showing R hemispheric SDH 2.2cm w/ mass effect and 1.1cm MLS. On reassessment in ED pt opening eyes to voice, answering some questions and following simple commands. SBP after CT in 200s, started on Nicardipine gtt. Received 1g IV Keppra, 1850mL LR bolus, and was started on insulin gtt for BP in 400s. Pt assessed by NSGY, going to OR for evacuation. Admitted to NSICU for further management.

## 2024-10-16 NOTE — PHARMACOTHERAPY INTERVENTION NOTE - INTERVENTION TYPE RECOOMEND
Route of Administration Change
Therapy Recommended - Alternative treatment
Therapy Recommended - Additional therapy

## 2024-10-16 NOTE — PHYSICAL THERAPY INITIAL EVALUATION ADULT - PRECAUTIONS/LIMITATIONS, REHAB EVAL
cardiac precautions/fall precautions/seizure precautions -150 mmHg/cardiac precautions/fall precautions/seizure precautions

## 2024-10-16 NOTE — SWALLOW BEDSIDE ASSESSMENT ADULT - SWALLOW EVAL: DIAGNOSIS
+toleration for thin liquids w/o overt s/s of penetration/aspiration
+toleration for thin, mildly thick, puree and easy to chew solids w/o overt s/s of penetration/aspiration

## 2024-10-16 NOTE — CHART NOTE - NSCHARTNOTEFT_GEN_A_CORE
35 cc aspirated from Rt Sided extracalvarial fluid collection under sterile conditions and incision oversewn

## 2024-10-16 NOTE — OCCUPATIONAL THERAPY INITIAL EVALUATION ADULT - PERTINENT HX OF CURRENT PROBLEM, REHAB EVAL
79F w/ PMHx of MDS, DM, HTN, CKD presents after fall at home, found to have R SDH.   s/p 3 s/p crani and hematoma evacuation and MMA embolization

## 2024-10-16 NOTE — SPEECH LANGUAGE PATHOLOGY EVALUATION - SLP GENERAL OBSERVATIONS
Pt received in bed w/ PT/OT at b/s, seen by interdisciplinary team to promote arousal and participation

## 2024-10-16 NOTE — CHART NOTE - NSCHARTNOTEFT_GEN_A_CORE
NSICU Transfer Note    NSICU --->     Accepting Physician:    Signout given to:     HPI / CCU COURSE:          Vital Signs Last 24 Hrs  T(C): 36.7 (16 Oct 2024 04:00), Max: 36.8 (16 Oct 2024 00:00)  T(F): 98.1 (16 Oct 2024 04:00), Max: 98.3 (16 Oct 2024 00:00)  HR: 69 (16 Oct 2024 07:00) (62 - 91)  BP: --  BP(mean): --  RR: 19 (16 Oct 2024 07:00) (18 - 38)  SpO2: 100% (16 Oct 2024 07:00) (96% - 100%)    Parameters below as of 16 Oct 2024 03:18  Patient On (Oxygen Delivery Method): nasal cannula  O2 Flow (L/min): 4      I&O's Summary    15 Oct 2024 07:01  -  16 Oct 2024 07:00  --------------------------------------------------------  IN: 3.2 mL / OUT: 1025 mL / NET: -1021.8 mL    16 Oct 2024 07:01  -  16 Oct 2024 08:56  --------------------------------------------------------  IN: 0 mL / OUT: 175 mL / NET: -175 mL        Physical Exam:       LABS:                               8.1    10.43 )-----------( 187      ( 16 Oct 2024 04:52 )             24.3       10-16    144  |  114[H]  |  70[HH]  ----------------------------<  62[L]  4.5   |  20  |  1.5    Ca    8.7      16 Oct 2024 04:52  Phos  4.1     10-16  Mg     2.2     10-16    TPro  5.0[L]  /  Alb  3.6  /  TBili  0.9  /  DBili  x   /  AST  21  /  ALT  28  /  AlkPhos  89  10-16          ABG - ( 15 Oct 2024 09:57 )  pH, Arterial: 7.30  pH, Blood: x     /  pCO2: 32    /  pO2: 117   / HCO3: 16    / Base Excess: -9.8  /  SaO2: 97.4                  Imaging:      ASSESSMENT & PLAN:     #  #  #    FOR FOLLOW UP:  [ ]   [ ]   [ ]     Addis Scruggs NP

## 2024-10-16 NOTE — PHYSICAL THERAPY INITIAL EVALUATION ADULT - GENERAL OBSERVATIONS, REHAB EVAL
11:45-12:15 Pt. encountered in semifowler in bed in NAD. 1:1 sit at bedside; Katya OT at bedside. +Primafit, +O2 NC, +Tele, +Pulse ox, +BP cuff. Agreeable to PT. SBP outside of set parameters during session (156 mmHg). Addis NIEVES made aware and agreeable for pt. to continue session. Bernabe VALENZUELA aware.

## 2024-10-16 NOTE — PHYSICAL THERAPY INITIAL EVALUATION ADULT - BED MOBILITY LIMITATIONS, REHAB EVAL
tolerated sitting EOB for ~10 minutes/decreased ability to use arms for pushing/pulling/decreased ability to use legs for bridging/pushing/impaired ability to control trunk for mobility

## 2024-10-16 NOTE — PHYSICAL THERAPY INITIAL EVALUATION ADULT - MD ORDER
s/p Craniotomy, emergent, for subdural hematoma evacuation (10/8/2024)   s/p Diagnostic cerebral angiogram + right middle meningeal artery embolization (10/9/2024)

## 2024-10-16 NOTE — PHYSICAL THERAPY INITIAL EVALUATION ADULT - IMPAIRMENTS FOUND, PT EVAL
L unilateral neglect/inattention/ergonomics and body mechanics/gait, locomotion, and balance/gross motor/muscle strength/poor safety awareness/posture/ROM

## 2024-10-16 NOTE — SPEECH LANGUAGE PATHOLOGY EVALUATION - SLP DIAGNOSIS
Pt presents w/ severe receptive/expressive and cog deficits however exam was negatively impacted by decreased arousal

## 2024-10-16 NOTE — CHART NOTE - NSCHARTNOTEFT_GEN_A_CORE
Pt is extubated and on 3L NC. Trauma team came in to do tertiary survey. Pt was unable to verbalize where the pain is but is following some commands. Sluggishly moving b/l upper and lower extremities. Trauma will attempt tertiary when the pt is more awake and alert. Please recall surgery PRN

## 2024-10-16 NOTE — OCCUPATIONAL THERAPY INITIAL EVALUATION ADULT - NS ASR FOLLOW COMMAND OT EVAL
impacted by arousal, will continue to assess/50% of the time/able to follow single-step instructions

## 2024-10-16 NOTE — PROGRESS NOTE ADULT - SUBJECTIVE AND OBJECTIVE BOX
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 Per pt's daughter, pt was LKW - 10/7/24  evening. Daughter states this morning pt was not answering the phone and did not open the door for her home health aide. Daughter came to home and found her on the floor, pt was conscious and told daughter "I fell". On initial arrival to ED pt only EO to pain, not following commands, WD extremities. Underwent CTH showing R hemispheric SDH 2.2cm w/ mass effect and 1.1cm MLS. On reassessment in ED pt opening eyes to voice, answering some questions and following simple commands. SBP after CT in 200s, started on Nicardipine gtt. Received 1g IV Keppra, 1850mL LR bolus, and was started on insulin gtt for BP in 400s. Pt assessed by NSGY, going to OR for evacuation. Admitted to NSICU for further management.    Overnight - stable exam and respiratory status, given 1 unit prbc    On admission   GCS 15       - s/p craniotomy and hematoma evacuation with improving MLS c/b by postop mild R SD recollection               S/P 4 U PRBC / i FFP/i PLT               Intubated    S/P MMA      VITALS: [x] Reviewed      EXAMINATION:  General: WN/WD, no acute distress, hypophonic; on hfnc and satup and comfortable   HENT: NC/AT, moist oral mucosa  Eyes: Anicteric sclerae  Cardiac: [id number]  Lungs: Clear  Abdomen: Soft, non-tender, +BS  Extremities: +1 UE/LE edema  Skin/Incision Site: Clean, dry and intact  Neurologic: awake alert oriented x 3, hypophonic, dysarthric, mild facial, midline gaze, perrl, b/l UEs no drift and AG and b/l LEs no drift and AG, follows all commands             ICU Vital Signs Last 24 Hrs  T(C): 36.7 (16 Oct 2024 04:00), Max: 36.8 (16 Oct 2024 00:00)  T(F): 98.1 (16 Oct 2024 04:00), Max: 98.3 (16 Oct 2024 00:00)  HR: 69 (16 Oct 2024 07:00) (62 - 91)  BP: --  BP(mean): --  ABP: 148/45 (16 Oct 2024 07:00) (124/40 - 160/52)  ABP(mean): 85 (16 Oct 2024 07:00) (67 - 96)  RR: 19 (16 Oct 2024 07:00) (18 - 38)  SpO2: 100% (16 Oct 2024 07:00) (96% - 100%)      10-15-24 @ 07:01  -  10-16-24 @ 07:00  --------------------------------------------------------  IN: 3.2 mL / OUT: 1025 mL / NET: -1021.8 mL    10-16-24 @ 07:01  -  10-16-24 @ 08:54  --------------------------------------------------------  IN: 0 mL / OUT: 175 mL / NET: -175 mL            acetaminophen     Tablet .. 975 milliGRAM(s) Oral every 6 hours PRN  albuterol/ipratropium for Nebulization 3 milliLiter(s) Nebulizer every 6 hours  amLODIPine   Tablet 10 milliGRAM(s) Oral daily  chlorhexidine 2% Cloths 1 Application(s) Topical <User Schedule>  dextrose 50% Injectable 50 milliLiter(s) IV Push every 15 minutes  dextrose 50% Injectable 25 milliLiter(s) IV Push every 15 minutes  dextrose 50% Injectable 25 milliLiter(s) IV Push every 15 minutes  furosemide    Tablet 40 milliGRAM(s) Oral daily  heparin   Injectable 5000 Unit(s) SubCutaneous every 12 hours  insulin lispro (ADMELOG) corrective regimen sliding scale   SubCutaneous every 6 hours  labetalol 100 milliGRAM(s) Oral every 8 hours  labetalol Injectable 10 milliGRAM(s) IV Push every 3 hours PRN  levETIRAcetam 500 milliGRAM(s) Oral two times a day  mupirocin 2% Ointment 1 Application(s) Topical every 12 hours  niCARdipine Infusion 5 mG/Hr (25 mL/Hr) IV Continuous <Continuous>  OLANZapine Injectable 2.5 milliGRAM(s) IntraMuscular every 4 hours PRN  polyethylene glycol 3350 17 Gram(s) Oral daily  senna 2 Tablet(s) Oral at bedtime  sodium bicarbonate 1300 milliGRAM(s) Oral every 8 hours  sodium zirconium cyclosilicate 5 Gram(s) Oral every 24 hours  traMADol 25 milliGRAM(s) Oral every 6 hours PRN  traMADol 50 milliGRAM(s) Oral every 6 hours PRN      LABS:  Na: 144 (10-16 @ 04:52), 142 (10-15 @ 04:35), 145 (10-14 @ 23:05), 144 (10-14 @ 21:17), 148 (10-14 @ 10:42), 145 (10-14 @ 06:05)  K: 4.5 (10-16 @ 04:52), 4.6 (10-15 @ 04:35), 5.0 (10-14 @ 23:05), 4.4 (10-14 @ 21:17), 5.2 (10-14 @ 10:42), 5.2 (10-14 @ 06:05)  Cl: 114 (10-16 @ 04:52), 117 (10-15 @ 04:35), 117 (10-14 @ 23:05), 115 (10-14 @ 21:17), 119 (10-14 @ 10:42), 122 (10-14 @ 06:05)  CO2: 20 (10-16 @ 04:52), 17 (10-15 @ 04:35), 16 (10-14 @ 23:05), 15 (10-14 @ 21:17), 12 (10-14 @ 10:42), 14 (10-14 @ 06:05)  BUN: 70 (10-16 @ 04:52), 71 (10-15 @ 04:35), 73 (10-14 @ 23:05), 73 (10-14 @ 21:17), 67 (10-14 @ 10:42), 63 (10-14 @ 06:05)  Cr: 1.5 (10-16 @ 04:52), 1.6 (10-15 @ 04:35), 1.7 (10-14 @ 23:05), 1.7 (10-14 @ 21:17), 1.5 (10-14 @ 10:42), 1.5 (10-14 @ 06:05)  Glu: 62(10-16 @ 04:52), 139(10-15 @ 04:35), 143(10-14 @ 23:05), 166(10-14 @ 21:17), 263(10-14 @ 10:42), 173(10-14 @ 06:05)    Hgb: 8.1 (10-16 @ 04:52), 6.7 (10-16 @ 00:03), 6.8 (10-15 @ 16:58), 6.6 (10-15 @ 06:25), 6.3 (10-15 @ 04:35), 7.4 (10-14 @ 06:05)  Hct: 24.3 (10-16 @ 04:52), 20.7 (10-16 @ 00:03), 21.1 (10-15 @ 16:58), 20.1 (10-15 @ 06:25), 19.6 (10-15 @ 04:35), 23.1 (10-14 @ 06:05)  WBC: 10.43 (10-16 @ 04:52), 7.83 (10-16 @ 00:03), 7.26 (10-15 @ 16:58), 8.68 (10-15 @ 06:25), 7.88 (10-15 @ 04:35), 18.26 (10-14 @ 06:05)  Plt: 187 (10-16 @ 04:52), 182 (10-16 @ 00:03), 192 (10-15 @ 16:58), 204 (10-15 @ 06:25), 196 (10-15 @ 04:35), 304 (10-14 @ 06:05)    INR:   PTT:           LIVER FUNCTIONS - ( 16 Oct 2024 04:52 )  Alb: 3.6 g/dL / Pro: 5.0 g/dL / ALK PHOS: 89 U/L / ALT: 28 U/L / AST: 21 U/L / GGT: x           ABG - ( 15 Oct 2024 09:57 )  pH, Arterial: 7.30  pH, Blood: x     /  pCO2: 32    /  pO2: 117   / HCO3: 16    / Base Excess: -9.8  /  SaO2: 97.4                                                                 SUMMARY: 79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 Per pt's daughter, pt was LKW - 10/7/24  evening. Daughter states this morning pt was not answering the phone and did not open the door for her home health aide. Daughter came to home and found her on the floor, pt was conscious and told daughter "I fell". On initial arrival to ED pt only EO to pain, not following commands, WD extremities. Underwent CTH showing R hemispheric SDH 2.2cm w/ mass effect and 1.1cm MLS. On reassessment in ED pt opening eyes to voice, answering some questions and following simple commands. SBP after CT in 200s, started on Nicardipine gtt. Received 1g IV Keppra, 1850mL LR bolus, and was started on insulin gtt for BP in 400s. Pt assessed by NSGY, going to OR for evacuation. Admitted to NSICU for further management.    Overnight - stable exam and respiratory status, given 1 unit prbc    On admission   GCS 15       - s/p craniotomy and hematoma evacuation with improving MLS c/b by postop mild R SD recollection               S/P 4 U PRBC / i FFP/i PLT               Intubated    S/P MMA      VITALS: [x] Reviewed      EXAMINATION:  General: WN/WD, no acute distress, hypophonic; on hfnc and satup and comfortable   HENT: NC/AT, moist oral mucosa  Eyes: Anicteric sclerae  Cardiac: [id number]  Lungs: Clear  Abdomen: Soft, non-tender, +BS  Extremities: +1 UE/LE edema  Skin/Incision Site: Clean, dry and intact (but pillow with discoloration concerning for incision site leak  Neurologic: awake alert oriented x 3, hypophonic, dysarthric, mild facial, midline gaze, perrl, b/l UEs no drift and AG and b/l LEs no drift and AG, follows all commands             ICU Vital Signs Last 24 Hrs  T(C): 36.7 (16 Oct 2024 04:00), Max: 36.8 (16 Oct 2024 00:00)  T(F): 98.1 (16 Oct 2024 04:00), Max: 98.3 (16 Oct 2024 00:00)  HR: 69 (16 Oct 2024 07:00) (62 - 91)  BP: --  BP(mean): --  ABP: 148/45 (16 Oct 2024 07:00) (124/40 - 160/52)  ABP(mean): 85 (16 Oct 2024 07:00) (67 - 96)  RR: 19 (16 Oct 2024 07:00) (18 - 38)  SpO2: 100% (16 Oct 2024 07:00) (96% - 100%)      10-15-24 @ 07:01  -  10-16-24 @ 07:00  --------------------------------------------------------  IN: 3.2 mL / OUT: 1025 mL / NET: -1021.8 mL    10-16-24 @ 07:01  -  10-16-24 @ 08:54  --------------------------------------------------------  IN: 0 mL / OUT: 175 mL / NET: -175 mL            acetaminophen     Tablet .. 975 milliGRAM(s) Oral every 6 hours PRN  albuterol/ipratropium for Nebulization 3 milliLiter(s) Nebulizer every 6 hours  amLODIPine   Tablet 10 milliGRAM(s) Oral daily  chlorhexidine 2% Cloths 1 Application(s) Topical <User Schedule>  dextrose 50% Injectable 50 milliLiter(s) IV Push every 15 minutes  dextrose 50% Injectable 25 milliLiter(s) IV Push every 15 minutes  dextrose 50% Injectable 25 milliLiter(s) IV Push every 15 minutes  furosemide    Tablet 40 milliGRAM(s) Oral daily  heparin   Injectable 5000 Unit(s) SubCutaneous every 12 hours  insulin lispro (ADMELOG) corrective regimen sliding scale   SubCutaneous every 6 hours  labetalol 100 milliGRAM(s) Oral every 8 hours  labetalol Injectable 10 milliGRAM(s) IV Push every 3 hours PRN  levETIRAcetam 500 milliGRAM(s) Oral two times a day  mupirocin 2% Ointment 1 Application(s) Topical every 12 hours  niCARdipine Infusion 5 mG/Hr (25 mL/Hr) IV Continuous <Continuous>  OLANZapine Injectable 2.5 milliGRAM(s) IntraMuscular every 4 hours PRN  polyethylene glycol 3350 17 Gram(s) Oral daily  senna 2 Tablet(s) Oral at bedtime  sodium bicarbonate 1300 milliGRAM(s) Oral every 8 hours  sodium zirconium cyclosilicate 5 Gram(s) Oral every 24 hours  traMADol 25 milliGRAM(s) Oral every 6 hours PRN  traMADol 50 milliGRAM(s) Oral every 6 hours PRN      LABS:  Na: 144 (10-16 @ 04:52), 142 (10-15 @ 04:35), 145 (10-14 @ 23:05), 144 (10-14 @ 21:17), 148 (10-14 @ 10:42), 145 (10-14 @ 06:05)  K: 4.5 (10-16 @ 04:52), 4.6 (10-15 @ 04:35), 5.0 (10-14 @ 23:05), 4.4 (10-14 @ 21:17), 5.2 (10-14 @ 10:42), 5.2 (10-14 @ 06:05)  Cl: 114 (10-16 @ 04:52), 117 (10-15 @ 04:35), 117 (10-14 @ 23:05), 115 (10-14 @ 21:17), 119 (10-14 @ 10:42), 122 (10-14 @ 06:05)  CO2: 20 (10-16 @ 04:52), 17 (10-15 @ 04:35), 16 (10-14 @ 23:05), 15 (10-14 @ 21:17), 12 (10-14 @ 10:42), 14 (10-14 @ 06:05)  BUN: 70 (10-16 @ 04:52), 71 (10-15 @ 04:35), 73 (10-14 @ 23:05), 73 (10-14 @ 21:17), 67 (10-14 @ 10:42), 63 (10-14 @ 06:05)  Cr: 1.5 (10-16 @ 04:52), 1.6 (10-15 @ 04:35), 1.7 (10-14 @ 23:05), 1.7 (10-14 @ 21:17), 1.5 (10-14 @ 10:42), 1.5 (10-14 @ 06:05)  Glu: 62(10-16 @ 04:52), 139(10-15 @ 04:35), 143(10-14 @ 23:05), 166(10-14 @ 21:17), 263(10-14 @ 10:42), 173(10-14 @ 06:05)    Hgb: 8.1 (10-16 @ 04:52), 6.7 (10-16 @ 00:03), 6.8 (10-15 @ 16:58), 6.6 (10-15 @ 06:25), 6.3 (10-15 @ 04:35), 7.4 (10-14 @ 06:05)  Hct: 24.3 (10-16 @ 04:52), 20.7 (10-16 @ 00:03), 21.1 (10-15 @ 16:58), 20.1 (10-15 @ 06:25), 19.6 (10-15 @ 04:35), 23.1 (10-14 @ 06:05)  WBC: 10.43 (10-16 @ 04:52), 7.83 (10-16 @ 00:03), 7.26 (10-15 @ 16:58), 8.68 (10-15 @ 06:25), 7.88 (10-15 @ 04:35), 18.26 (10-14 @ 06:05)  Plt: 187 (10-16 @ 04:52), 182 (10-16 @ 00:03), 192 (10-15 @ 16:58), 204 (10-15 @ 06:25), 196 (10-15 @ 04:35), 304 (10-14 @ 06:05)    INR:   PTT:           LIVER FUNCTIONS - ( 16 Oct 2024 04:52 )  Alb: 3.6 g/dL / Pro: 5.0 g/dL / ALK PHOS: 89 U/L / ALT: 28 U/L / AST: 21 U/L / GGT: x           ABG - ( 15 Oct 2024 09:57 )  pH, Arterial: 7.30  pH, Blood: x     /  pCO2: 32    /  pO2: 117   / HCO3: 16    / Base Excess: -9.8  /  SaO2: 97.4

## 2024-10-16 NOTE — OCCUPATIONAL THERAPY INITIAL EVALUATION ADULT - LIVES WITH, PROFILE
pt questionable historian; however states she lives in pvt home with dtr, 3STE, bedroom up 1 flight confirmed with care coordinator assessment from 10/9 with dtr

## 2024-10-16 NOTE — OCCUPATIONAL THERAPY INITIAL EVALUATION ADULT - ADDITIONAL COMMENTS
care coordinator assessment states pt has HHA, unclear level of assistance or what aide assisted with, pt states she was independent

## 2024-10-16 NOTE — SPEECH LANGUAGE PATHOLOGY EVALUATION - SLP PERTINENT HISTORY OF CURRENT PROBLEM
21.25
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 CTH showing R hemispheric SDH 2.2cm w/ mass effect and 1.1cm MLS. s/p craniotomy and hematoma evacuation with improving MLS c/b by postop mild R SD recollection +MMA embolization.

## 2024-10-16 NOTE — PHYSICAL THERAPY INITIAL EVALUATION ADULT - ADDITIONAL COMMENTS
Difficult to obtain full history from pt. 2* to fluctuating alertness, but pt. states that she lives with dtr and uses a SC at baseline. Per chart, pt. lives with dtr in a private home with 3 steps to enter and 1 FOS inside. Dtr currently pays for private HCS while in the process of switching agencies to obtain services.

## 2024-10-16 NOTE — CHART NOTE - NSCHARTNOTEFT_GEN_A_CORE
Registered Dietitian Follow-Up     Patient Profile Reviewed                           Yes [x]   No []     Nutrition History Previously Obtained        Yes [x]  No []       Pertinent Subjective Information:  Pt is lethargic as per flowsheet documentation; pt interview not appropriate at this time.     Pertinent Medical Interventions:  79F w/ PMHx of MDS (follows w/ Dr Wade, requires periodic transfusions for anemia), DM, HTN, CKD BIBEMS after being found unresponsive on the floor in her home.     Diet order: Diet, Easy to Chew:   Consistent Carbohydrate {No Snacks} (CSTCHO)  DASH/TLC {Sodium & Cholesterol Restricted} (DASH) (10-15-24 @ 11:57) [Active]      Anthropometrics:  Height (cm): 162.6 (10-09-24 @ 17:30)  Weight (kg): 64.3 (10-09-24 @ 17:30)  BMI (kg/m2): 24.3 (10-09-24 @ 17:30)  IBW: 55 kg  UBW:     Daily Weight in k.3 (10-16), Weight in k.2 (10-15), Weight in k.1 (10-14), Weight in k.8 (10-13), Weight in k (10-12), Weight in k.3 (10-11), Weight in k.8 (10-10)  % Weight Change    MEDICATIONS  (STANDING):  albuterol/ipratropium for Nebulization 3 milliLiter(s) Nebulizer every 6 hours  amLODIPine   Tablet 10 milliGRAM(s) Oral daily  chlorhexidine 2% Cloths 1 Application(s) Topical <User Schedule>  dextrose 50% Injectable 50 milliLiter(s) IV Push every 15 minutes  dextrose 50% Injectable 25 milliLiter(s) IV Push every 15 minutes  dextrose 50% Injectable 25 milliLiter(s) IV Push every 15 minutes  furosemide    Tablet 40 milliGRAM(s) Oral daily  heparin   Injectable 5000 Unit(s) SubCutaneous every 8 hours  insulin lispro (ADMELOG) corrective regimen sliding scale   SubCutaneous every 6 hours  labetalol 200 milliGRAM(s) Oral every 8 hours  levETIRAcetam 500 milliGRAM(s) Oral two times a day  mupirocin 2% Ointment 1 Application(s) Topical every 12 hours  niCARdipine Infusion 5 mG/Hr (25 mL/Hr) IV Continuous <Continuous>  polyethylene glycol 3350 17 Gram(s) Oral daily  senna 2 Tablet(s) Oral at bedtime  sodium bicarbonate 1300 milliGRAM(s) Oral every 8 hours    MEDICATIONS  (PRN):  acetaminophen     Tablet .. 975 milliGRAM(s) Oral every 6 hours PRN Temp greater or equal to 38C (100.4F), Mild Pain (1 - 3)  labetalol Injectable 10 milliGRAM(s) IV Push every 3 hours PRN Systolic blood pressure > 150  OLANZapine Injectable 2.5 milliGRAM(s) IntraMuscular every 4 hours PRN agitation  sodium zirconium cyclosilicate 5 Gram(s) Oral every 24 hours PRN Potassium > 4.8  traMADol 50 milliGRAM(s) Oral every 6 hours PRN Severe Pain (7 - 10)  traMADol 25 milliGRAM(s) Oral every 6 hours PRN Moderate Pain (4 - 6)    Pertinent Labs: 10-16 @ 04:52: Na 144, BUN 70[HH], Cr 1.5, BG 62[L], K+ 4.5, Phos 4.1, Mg 2.2, Alk Phos 89, ALT/SGPT 28, AST/SGOT 21, HbA1c --    Finger Sticks:  POCT Blood Glucose.: 121 mg/dL (10-16 @ 11:07)  POCT Blood Glucose.: 79 mg/dL (10-16 @ 07:45)  POCT Blood Glucose.: 72 mg/dL (10-16 @ 05:41)  POCT Blood Glucose.: 189 mg/dL (10-15 @ 23:14)  POCT Blood Glucose.: 438 mg/dL (10-15 @ 17:26)    Physical Findings:  - Appearance:  - GI function:  - Tubes:  - Oral/Mouth cavity:  - Skin:  - Edema:     Nutrition Requirements:  Weight Used:     Estimated Energy Needs    Continue []  Adjust []  Adjusted Energy Recommendations:   kcal/day        Estimated Protein Needs    Continue []  Adjust []  Adjusted Protein Recommendations:   gm/day        Estimated Fluid Needs        Continue []  Adjust []  Adjusted Fluid Recommendations:   mL/day     Nutrient Intake:    [] Previous Nutrition Diagnosis:            [] Ongoing          [] Resolved    [] No active nutrition diagnosis identified at this time     Nutrition Education:      Goal/Expected Outcome:      Indicator/Monitoring:     Recommendation: Registered Dietitian Follow-Up (Consult)     Patient Profile Reviewed                           Yes [x]   No []     Nutrition History Previously Obtained        Yes [x]  No []       Pertinent Subjective Information:  Pt is lethargic as per flowsheet documentation; pt interview not appropriate at this time. Pt family not present at bedside to provide diet/wt hx. Will f/u when able. 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     Diet order: Diet, Easy to Chew:   Consistent Carbohydrate {No Snacks} (CSTCHO)  DASH/TLC {Sodium & Cholesterol Restricted} (DASH) (10-15-24 @ 11:57) [Active]      Anthropometrics:  Height (cm): 162.6 (10-09-24 @ 17:30)  Weight (kg): 64.3 (10-09-24 @ 17:30)  BMI (kg/m2): 24.3 (10-09-24 @ 17:30)  IBW: 55 kg  UBW: N/A    Daily Weight in k.3 (10-16), Weight in k.2 (10-15), Weight in k.1 (10-14), Weight in k.8 (10-13), Weight in k (10-12), Weight in k.3 (10-11), Weight in k.8 (10-10)  % Weight Change 0% change since admission; pt does not meet criteria for malnutrition.    MEDICATIONS  (STANDING):  albuterol/ipratropium for Nebulization 3 milliLiter(s) Nebulizer every 6 hours  amLODIPine   Tablet 10 milliGRAM(s) Oral daily  chlorhexidine 2% Cloths 1 Application(s) Topical <User Schedule>  dextrose 50% Injectable 50 milliLiter(s) IV Push every 15 minutes  dextrose 50% Injectable 25 milliLiter(s) IV Push every 15 minutes  dextrose 50% Injectable 25 milliLiter(s) IV Push every 15 minutes  furosemide    Tablet 40 milliGRAM(s) Oral daily  heparin   Injectable 5000 Unit(s) SubCutaneous every 8 hours  insulin lispro (ADMELOG) corrective regimen sliding scale   SubCutaneous every 6 hours  labetalol 200 milliGRAM(s) Oral every 8 hours  levETIRAcetam 500 milliGRAM(s) Oral two times a day  mupirocin 2% Ointment 1 Application(s) Topical every 12 hours  niCARdipine Infusion 5 mG/Hr (25 mL/Hr) IV Continuous <Continuous>  polyethylene glycol 3350 17 Gram(s) Oral daily  senna 2 Tablet(s) Oral at bedtime  sodium bicarbonate 1300 milliGRAM(s) Oral every 8 hours    MEDICATIONS  (PRN):  acetaminophen     Tablet .. 975 milliGRAM(s) Oral every 6 hours PRN Temp greater or equal to 38C (100.4F), Mild Pain (1 - 3)  labetalol Injectable 10 milliGRAM(s) IV Push every 3 hours PRN Systolic blood pressure > 150  OLANZapine Injectable 2.5 milliGRAM(s) IntraMuscular every 4 hours PRN agitation  sodium zirconium cyclosilicate 5 Gram(s) Oral every 24 hours PRN Potassium > 4.8  traMADol 50 milliGRAM(s) Oral every 6 hours PRN Severe Pain (7 - 10)  traMADol 25 milliGRAM(s) Oral every 6 hours PRN Moderate Pain (4 - 6)    Pertinent Labs: 10-16 @ 04:52: Na 144, BUN 70[HH], Cr 1.5, BG 62[L], K+ 4.5, Phos 4.1, Mg 2.2, Alk Phos 89, ALT/SGPT 28, AST/SGOT 21, HbA1c --    Finger Sticks:  POCT Blood Glucose.: 121 mg/dL (10-16 @ 11:07)  POCT Blood Glucose.: 79 mg/dL (10-16 @ 07:45)  POCT Blood Glucose.: 72 mg/dL (10-16 @ 05:41)  POCT Blood Glucose.: 189 mg/dL (10-15 @ 23:14)  POCT Blood Glucose.: 438 mg/dL (10-15 @ 17:26)    Physical Findings:  - Appearance: Well nourished  - GI function: No N/V as per PCA  - Tubes: N/A  - Oral/Mouth cavity: N/A  - Skin: WNL; ecchymotic  - Edema: UE and LE edema +1     Nutrition Requirements:  Weight Used: CBW with consideration for age, BMI, critical care setting     Estimated Energy Needs    Continue [x]  Adjust []  Energy: 1286 - 1608 kcal/day (20 - 25 kcal/kg) vs 1080.22 kcal/day (PSE Tmax 36.2, Ve 5.9)       Estimated Protein Needs    Continue [x]  Adjust []  Protein: 77 - 90 gm/day (1.2 - 1.4 gm/kg)        Estimated Fluid Needs        Continue [x]  Adjust []  Fluid: 1 mL/kcal      Nutrient Intake:    [] Previous Nutrition Diagnosis:            [] Ongoing          [x] Resolved    New Nutrition Diagnosis: Inadequate PO intake related to AMS as evidenced by PO intake <75% of meals.     Nutrition Education:   Nutrition education not appropriate given pt's current mental status    Goal/Expected Outcome:   -Pt will meet >50-75% of estimated nutrient needs PO within 5-7 days     Indicator/Monitoring:   -Diet order, weights, NFPF, labs, body composition, PO intake, diet texture tolerance    Recommendation:  -Continue current diet order  -Add Ensure Plus HP x2/day (provides 350 kcal and 20g PRO each) Registered Dietitian Follow-Up (Consult)     Patient Profile Reviewed                           Yes [x]   No []     Nutrition History Previously Obtained        Yes [x]  No []       Pertinent Subjective Information:  Pt is lethargic as per flowsheet documentation; pt interview not appropriate at this time. Pt family not present at bedside to provide diet/wt hx. Will f/u when able. 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     Diet order: Diet, Easy to Chew:   Consistent Carbohydrate {No Snacks} (CSTCHO)  DASH/TLC {Sodium & Cholesterol Restricted} (DASH) (10-15-24 @ 11:57) [Active]      Anthropometrics:  Height (cm): 162.6 (10-09-24 @ 17:30)  Weight (kg): 64.3 (10-09-24 @ 17:30)  BMI (kg/m2): 24.3 (10-09-24 @ 17:30)  IBW: 55 kg  UBW: N/A    Daily Weight in k.3 (10-16), Weight in k.2 (10-15), Weight in k.1 (10-14), Weight in k.8 (10-13), Weight in k (10-12), Weight in k.3 (10-11), Weight in k.8 (10-10)  % Weight Change 0% change since admission; pt does not meet criteria for malnutrition.    MEDICATIONS  (STANDING):  albuterol/ipratropium for Nebulization 3 milliLiter(s) Nebulizer every 6 hours  amLODIPine   Tablet 10 milliGRAM(s) Oral daily  chlorhexidine 2% Cloths 1 Application(s) Topical <User Schedule>  dextrose 50% Injectable 50 milliLiter(s) IV Push every 15 minutes  dextrose 50% Injectable 25 milliLiter(s) IV Push every 15 minutes  dextrose 50% Injectable 25 milliLiter(s) IV Push every 15 minutes  furosemide    Tablet 40 milliGRAM(s) Oral daily  heparin   Injectable 5000 Unit(s) SubCutaneous every 8 hours  insulin lispro (ADMELOG) corrective regimen sliding scale   SubCutaneous every 6 hours  labetalol 200 milliGRAM(s) Oral every 8 hours  levETIRAcetam 500 milliGRAM(s) Oral two times a day  mupirocin 2% Ointment 1 Application(s) Topical every 12 hours  niCARdipine Infusion 5 mG/Hr (25 mL/Hr) IV Continuous <Continuous>  polyethylene glycol 3350 17 Gram(s) Oral daily  senna 2 Tablet(s) Oral at bedtime  sodium bicarbonate 1300 milliGRAM(s) Oral every 8 hours    MEDICATIONS  (PRN):  acetaminophen     Tablet .. 975 milliGRAM(s) Oral every 6 hours PRN Temp greater or equal to 38C (100.4F), Mild Pain (1 - 3)  labetalol Injectable 10 milliGRAM(s) IV Push every 3 hours PRN Systolic blood pressure > 150  OLANZapine Injectable 2.5 milliGRAM(s) IntraMuscular every 4 hours PRN agitation  sodium zirconium cyclosilicate 5 Gram(s) Oral every 24 hours PRN Potassium > 4.8  traMADol 50 milliGRAM(s) Oral every 6 hours PRN Severe Pain (7 - 10)  traMADol 25 milliGRAM(s) Oral every 6 hours PRN Moderate Pain (4 - 6)    Pertinent Labs: 10-16 @ 04:52: Na 144, BUN 70[HH], Cr 1.5, BG 62[L], K+ 4.5, Phos 4.1, Mg 2.2, Alk Phos 89, ALT/SGPT 28, AST/SGOT 21, HbA1c --    Finger Sticks:  POCT Blood Glucose.: 121 mg/dL (10-16 @ 11:07)  POCT Blood Glucose.: 79 mg/dL (10-16 @ 07:45)  POCT Blood Glucose.: 72 mg/dL (10-16 @ 05:41)  POCT Blood Glucose.: 189 mg/dL (10-15 @ 23:14)  POCT Blood Glucose.: 438 mg/dL (10-15 @ 17:26)    Physical Findings:  - Appearance: Well nourished  - GI function: No N/V as per PCA  - Tubes: N/A  - Oral/Mouth cavity: N/A  - Skin: WNL; ecchymotic  - Edema: UE and LE edema +1     Nutrition Requirements:  Weight Used: CBW with consideration for age, BMI, critical care setting     Estimated Energy Needs    Continue [x]  Adjust []  Energy: 1286 - 1608 kcal/day (20 - 25 kcal/kg) vs 1080.22 kcal/day (PSE Tmax 36.2, Ve 5.9)       Estimated Protein Needs    Continue [x]  Adjust []  Protein: 77 - 90 gm/day (1.2 - 1.4 gm/kg)        Estimated Fluid Needs        Continue [x]  Adjust []  Fluid: 1 mL/kcal      Nutrient Intake:    [] Previous Nutrition Diagnosis:            [] Ongoing          [x] Resolved    New Nutrition Diagnosis: Inadequate PO intake related to AMS as evidenced by PO intake <75% of meals.     Nutrition Education:   Nutrition education not appropriate given pt's current mental status    Goal/Expected Outcome:   -Pt will meet >50-75% of estimated nutrient needs PO within 5-7 days     Indicator/Monitoring:   -Diet order, weights, NFPF, labs, body composition, PO intake, diet texture tolerance    Recommendation:  -Continue current diet order  -Add appssavvy Glucose Support 1.2 x1/day (provides 300 kcal and 16g PRO each) Registered Dietitian Follow-Up (Consult)     Patient Profile Reviewed                           Yes [x]   No []     Nutrition History Previously Obtained        Yes [x]  No []       Pertinent Subjective Information:  Pt is lethargic as per flowsheet documentation; pt interview not appropriate at this time. Pt family not present at bedside to provide diet/wt hx. Will f/u when able. PCA reports pt ate 25-50% of breakfast today (10/16)     Pertinent Medical Interventions:  79F w/ PMHx of MDS (follows w/ Dr Wade, requires periodic transfusions for anemia), DM, HTN, CKD BIBEMS after being found unresponsive on the floor in her home.     Diet order: Diet, Easy to Chew:   Consistent Carbohydrate {No Snacks} (CSTCHO)  DASH/TLC {Sodium & Cholesterol Restricted} (DASH) (10-15-24 @ 11:57) [Active]      Anthropometrics:  Height (cm): 162.6 (10-09-24 @ 17:30)  Weight (kg): 64.3 (10-09-24 @ 17:30)  BMI (kg/m2): 24.3 (10-09-24 @ 17:30)  IBW: 55 kg  UBW: N/A    Daily Weight in k.3 (10-16), Weight in k.2 (10-15), Weight in k.1 (10-14), Weight in k.8 (10-13), Weight in k (10-12), Weight in k.3 (10-11), Weight in k.8 (10-10)  % Weight Change 0% change since admission; pt does not meet criteria for malnutrition.    MEDICATIONS  (STANDING):  albuterol/ipratropium for Nebulization 3 milliLiter(s) Nebulizer every 6 hours  amLODIPine   Tablet 10 milliGRAM(s) Oral daily  chlorhexidine 2% Cloths 1 Application(s) Topical <User Schedule>  dextrose 50% Injectable 50 milliLiter(s) IV Push every 15 minutes  dextrose 50% Injectable 25 milliLiter(s) IV Push every 15 minutes  dextrose 50% Injectable 25 milliLiter(s) IV Push every 15 minutes  furosemide    Tablet 40 milliGRAM(s) Oral daily  heparin   Injectable 5000 Unit(s) SubCutaneous every 8 hours  insulin lispro (ADMELOG) corrective regimen sliding scale   SubCutaneous every 6 hours  labetalol 200 milliGRAM(s) Oral every 8 hours  levETIRAcetam 500 milliGRAM(s) Oral two times a day  mupirocin 2% Ointment 1 Application(s) Topical every 12 hours  niCARdipine Infusion 5 mG/Hr (25 mL/Hr) IV Continuous <Continuous>  polyethylene glycol 3350 17 Gram(s) Oral daily  senna 2 Tablet(s) Oral at bedtime  sodium bicarbonate 1300 milliGRAM(s) Oral every 8 hours    MEDICATIONS  (PRN):  acetaminophen     Tablet .. 975 milliGRAM(s) Oral every 6 hours PRN Temp greater or equal to 38C (100.4F), Mild Pain (1 - 3)  labetalol Injectable 10 milliGRAM(s) IV Push every 3 hours PRN Systolic blood pressure > 150  OLANZapine Injectable 2.5 milliGRAM(s) IntraMuscular every 4 hours PRN agitation  sodium zirconium cyclosilicate 5 Gram(s) Oral every 24 hours PRN Potassium > 4.8  traMADol 50 milliGRAM(s) Oral every 6 hours PRN Severe Pain (7 - 10)  traMADol 25 milliGRAM(s) Oral every 6 hours PRN Moderate Pain (4 - 6)    Pertinent Labs: 10-16 @ 04:52: Na 144, BUN 70[HH], Cr 1.5, BG 62[L], K+ 4.5, Phos 4.1, Mg 2.2, Alk Phos 89, ALT/SGPT 28, AST/SGOT 21, HbA1c --    Finger Sticks:  POCT Blood Glucose.: 121 mg/dL (10-16 @ 11:07)  POCT Blood Glucose.: 79 mg/dL (10-16 @ 07:45)  POCT Blood Glucose.: 72 mg/dL (10-16 @ 05:41)  POCT Blood Glucose.: 189 mg/dL (10-15 @ 23:14)  POCT Blood Glucose.: 438 mg/dL (10-15 @ 17:26)    Physical Findings:  - Appearance: Well nourished  - GI function: No N/V as per PCA  - Tubes: N/A  - Oral/Mouth cavity: N/A  - Skin: WNL; ecchymotic  - Edema: UE and LE edema +1     Nutrition Requirements:  Weight Used: CBW with consideration for age, BMI, critical care setting     Estimated Energy Needs    Continue [x]  Adjust []  Energy: 1286 - 1608 kcal/day (20 - 25 kcal/kg) vs 1080.22 kcal/day (PSE Tmax 36.2, Ve 5.9)       Estimated Protein Needs    Continue [x]  Adjust []  Protein: 77 - 90 gm/day (1.2 - 1.4 gm/kg)        Estimated Fluid Needs        Continue [x]  Adjust []  Fluid: 1 mL/kcal      Nutrient Intake:    [] Previous Nutrition Diagnosis:            [] Ongoing          [x] Resolved    New Nutrition Diagnosis: Inadequate PO intake related to AMS as evidenced by PO intake <75% of meals.     Nutrition Education:   Nutrition education not appropriate given pt's current mental status    Goal/Expected Outcome:   -Pt will meet >50-75% of estimated nutrient needs PO within 5-7 days     Indicator/Monitoring:   -Diet order, weights, NFPF, labs, body composition, PO intake, diet texture tolerance    Recommendation:  -Continue current diet order  -Add Fleep Glucose Support 1.2 x1/day (provides 300 kcal and 16g PRO each) Registered Dietitian Follow-Up      Patient Profile Reviewed                           Yes [x]   No []     Nutrition History Previously Obtained        Yes []  No [x]       Pertinent Subjective Information:  Pt is lethargic as per flowsheet documentation; pt interview not appropriate at this time. Pt family not present at bedside to provide diet/wt hx. Will f/u when able. PCA reports pt ate 25-50% of breakfast today (10/16); pt tolerating diet texture/consistency.     Pertinent Medical Information:  79F w/ PMHx of MDS (follows w/ Dr Wade, requires periodic transfusions for anemia), DM, HTN, CKD BIBEMS after being found unresponsive on the floor in her home. s/p craniotomy and hematoma evacuation with improving MLS c/b by postop mild R SD; pt currently on nasal cannula. As per SLP note 15-Oct-2024 advance po diet to easy to chew w/ thin liquids       Diet order: Diet, Easy to Chew:   Consistent Carbohydrate {No Snacks} (CSTCHO)  DASH/TLC {Sodium & Cholesterol Restricted} (DASH) (10-15-24 @ 11:57) [Active]      Anthropometrics:  Height (cm): 162.6 (10-09-24 @ 17:30)  Weight (kg): 64.3 (10-09-24 @ 17:30)  BMI (kg/m2): 24.3 (10-09-24 @ 17:30)  IBW: 55 kg  UBW: N/A    Daily Weight in k.3 (10-16), Weight in k.2 (10-15), Weight in k.1 (10-14), Weight in k.8 (10-13), Weight in k (10-12), Weight in k.3 (10-11), Weight in k.8 (10-10)  % Weight Change: wt fluctuations likely due to fluid shifts. Pt does not meet wt criteria for malnutrition.    MEDICATIONS  (STANDING):  albuterol/ipratropium for Nebulization 3 milliLiter(s) Nebulizer every 6 hours  amLODIPine   Tablet 10 milliGRAM(s) Oral daily  chlorhexidine 2% Cloths 1 Application(s) Topical <User Schedule>  dextrose 50% Injectable 50 milliLiter(s) IV Push every 15 minutes  dextrose 50% Injectable 25 milliLiter(s) IV Push every 15 minutes  dextrose 50% Injectable 25 milliLiter(s) IV Push every 15 minutes  furosemide    Tablet 40 milliGRAM(s) Oral daily  heparin   Injectable 5000 Unit(s) SubCutaneous every 8 hours  insulin lispro (ADMELOG) corrective regimen sliding scale   SubCutaneous every 6 hours  labetalol 200 milliGRAM(s) Oral every 8 hours  levETIRAcetam 500 milliGRAM(s) Oral two times a day  mupirocin 2% Ointment 1 Application(s) Topical every 12 hours  niCARdipine Infusion 5 mG/Hr (25 mL/Hr) IV Continuous <Continuous>  polyethylene glycol 3350 17 Gram(s) Oral daily  senna 2 Tablet(s) Oral at bedtime  sodium bicarbonate 1300 milliGRAM(s) Oral every 8 hours    MEDICATIONS  (PRN):  acetaminophen     Tablet .. 975 milliGRAM(s) Oral every 6 hours PRN Temp greater or equal to 38C (100.4F), Mild Pain (1 - 3)  labetalol Injectable 10 milliGRAM(s) IV Push every 3 hours PRN Systolic blood pressure > 150  OLANZapine Injectable 2.5 milliGRAM(s) IntraMuscular every 4 hours PRN agitation  sodium zirconium cyclosilicate 5 Gram(s) Oral every 24 hours PRN Potassium > 4.8  traMADol 50 milliGRAM(s) Oral every 6 hours PRN Severe Pain (7 - 10)  traMADol 25 milliGRAM(s) Oral every 6 hours PRN Moderate Pain (4 - 6)    Pertinent Labs: 10-16 @ 04:52: Na 144, BUN 70[HH], Cr 1.5, BG 62[L], K+ 4.5, Phos 4.1, Mg 2.2, Alk Phos 89, ALT/SGPT 28, AST/SGOT 21, HbA1c --    Finger Sticks:  POCT Blood Glucose.: 121 mg/dL (10-16 @ 11:07)  POCT Blood Glucose.: 79 mg/dL (10-16 @ 07:45)  POCT Blood Glucose.: 72 mg/dL (10-16 @ 05:41)  POCT Blood Glucose.: 189 mg/dL (10-15 @ 23:14)  POCT Blood Glucose.: 438 mg/dL (10-15 @ 17:26)    Physical Findings:  - Appearance: Well nourished  - GI function: No N/V as per PCA; Last BM 10/14  - Tubes: Extubated  - Oral/Mouth cavity: easy to chew w/ thin liquids per SLP recs  - Skin: WNL; ecchymotic; no PI noted  - Edema: UE and LE edema +1     Nutrition Requirements:  Weight Used: CBW with consideration for age, BMI, critical care setting; extubation     Estimated Energy Needs    Continue [x]  Adjust []  Energy: 1286 - 1608 kcal/day (20 - 25 kcal/kg)       Estimated Protein Needs    Continue [x]  Adjust []  Protein: 77 - 90 gm/day (1.2 - 1.4 gm/kg)        Estimated Fluid Needs        Continue [x]  Adjust []  Fluid: 1 mL/kcal      Nutrient Intake:   PCA reports pt ate 25-50% of breakfast today (10/16)    [] Previous Nutrition Diagnosis: Inadequate Oral Intake (due to intubation)            [] Ongoing          [x] Resolved; extubated    New Nutrition Diagnosis:   Inadequate PO intake   related to AMS   as evidenced by <50% PO intake  Goal: Pt will meet >50-75% of estimated nutrient needs PO within 5-7 days     Nutrition Education:   Nutrition education not appropriate given pt's current mental status    Goal/Expected Outcome:   -Pt to meet >50-75% of estimated nutrient needs PO within 5-7 days     Indicator/Monitoring:   -Diet order, weights, NFPF, labs, body composition, PO intake, diet texture tolerance    Recommendation:  -Continue current diet order  -Add Glucerna Shake x2/day (provides 220 kcal and 10g PRO each)    Moderate Risk f/u 5-7 days    RD to remain available  Morales King RD via TEAMs

## 2024-10-16 NOTE — SWALLOW BEDSIDE ASSESSMENT ADULT - SLP PERTINENT HISTORY OF CURRENT PROBLEM
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 CTH showing R hemispheric SDH 2.2cm w/ mass effect and 1.1cm MLS. s/p craniotomy and hematoma evacuation with improving MLS c/b by postop mild R SD recollection +MMA embolization.
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 CTH showing R hemispheric SDH 2.2cm w/ mass effect and 1.1cm MLS. s/p craniotomy and hematoma evacuation with improving MLS c/b by postop mild R SD recollection +MMA embolization.

## 2024-10-16 NOTE — SWALLOW BEDSIDE ASSESSMENT ADULT - SLP GENERAL OBSERVATIONS
Pt received in bed asleep, reposited w/ PCA, woke to stim, on HFNC 25/25
Pt received in bed lethargic, reportedly more awake this am and tolerated breakfast, once seated at edge of bed by PT/OT pt accepted thins only, declined all other po trials

## 2024-10-16 NOTE — OCCUPATIONAL THERAPY INITIAL EVALUATION ADULT - GENERAL OBSERVATIONS, REHAB EVAL
Pt received semi lau in bed in NAD, lethargic but arouses to touch/voice, +tele, + L wrist A line, + BP cuff, +pulse oxi, +primafit, RN aware, PT present throughout, SLP Cecily present for portion of eval, left semi lau in bed in NAD, vitals stable, SBP within parameters thorughout

## 2024-10-16 NOTE — PROGRESS NOTE ADULT - ASSESSMENT
ASSESSMENT/PLAN:  79F w/ PMHx of MDS, DM, HTN, CKD presents after fall at home, found to have R SDH.   s/p 3 s/p crani and hematoma evacuation and MMA embolization     NEURO:  q4h neurochecks  repeat cth 10/10/24- stable - R Holo acute SDH - 4.5 R---> L shift   NSGY following  analgesia- tylenol and tramadol  Activity: [X] oobtc with assist if not agitated  PT/OT    PULM: s/p Resp failure secondary to neuro injury/pulmonary edema/?stridor  LFNC; obtain abg   s/p solumedrol IV   extubated to hfnc/bipap on 10/13  c/w lasix, duonebs q6  HOB>30  Aspiration precautions    CV: htn, pulmonary edema   MAP>65, sbp <150  c/w CCB  continue labetalol (instead of ACEi) which was started inpatient   Takes Lisinopril (held for hyperK 4.7- 5.0 )   lasix initiated for pulmonary edema- can dc once no longer indicated  echo    TTE Echo Complete w/o Contrast w/ Doppler (10.09.24 @ 12:41) >  Summary:   1. Technically limited study.   2. Normal global left ventricular systolic function.   3. Left ventricular ejection fraction, by visual estimation, is 65 to   70%.   4. The left ventricular diastolic function could not be assessed in this   study.   5. Normal right ventricular size and function.   6. Mildly enlarged left atrium.  7. Sclerotic aortic valve with normal opening.   8. Mild tricuspid regurgitation.   9. Normal pulmonary artery pressure.      RENAL: CKD-  AGMA (MARCO on cKD)/NAGMA ((hyperchloremia)  / Hyperphostemia/ Metabolic acidosis secondary to CKD /hyperK  - H2Co3 1300 mg q8  _ can deescalate to home dose 650 mg q 8 as pt acid-base status improves  c/w po diuresis   lokelma initiated (pt on SPS outpatient)- qd-- can hold for hypokalemia  - Will monitor electrolytes   ocampo placed on 10/12- dc today    GI:  dysphagia/swallow- advance diet per swallow eval  GI prophylaxis [x] not indicated [] PPI   Bowel regimen [x] miralax [x] senna [] other:  LBM - 10/14    ENDO:  Prediabetic    ac/hs correction scale insulin   Goal -180  now off steroids  Hold glimepiride, jardiance, pioglitazone  a1c- 6.0   TSH- 2.68     HEME/ONC:  MDS   VTE prophylaxis: [x] SCDs [ [x] heparin 5 K q 8 SQ   venous dopplers b/l LEs- neg - 10/9/24   anemia due to MDS as well as worsening renal insufficiency- repeat today, if Hb <7 will transfuse  s/p 1 unit prbc overnight with good response, will repeat to ensure stability  no sequelae of bleeding  MDS hx (follows with Dr Wade)- - On revlimid and Vidaza as OPT previously NOW on Lusaracept.   LUE superficial thrombophlebitis on duplex from10/15--> elevate LUE and ice packs     ID:  monitor off abx  cxr on 10/12 with ?perihilar opacity on L mid lung zone   Cultures neg  PCT on 10/9 is 7.3--> repeat 1.04  UA negative  MRSA +- bactroban per protocol      SOCIAL/FAMILY:  [x] updated at bedside and over phone    PIV     CODE STATUS:  [x] Full Code     DISPOSITION:  [x] nsicu if cbc stable ASSESSMENT/PLAN:  79F w/ PMHx of MDS, DM, HTN, CKD presents after fall at home, found to have R SDH.   s/p 3 s/p crani and hematoma evacuation and MMA embolization     NEURO:  q4h neurochecks  repeat CTH today to r/o worsening collection/postop pseudomeningocele; NSGY aware- if worse, consider LD drain   repeat cth 10/10/24- stable - R Holo acute SDH - 4.5 R---> L shift   NSGY following  analgesia- tylenol and tramadol  Activity: [X] oobtc with assist if not agitated  PT/OT    PULM: s/p Resp failure secondary to neuro injury/pulmonary edema/?stridor  LFNC; obtain abg   s/p solumedrol IV   extubated to hfnc/bipap on 10/13  c/w lasix, duonebs q6  HOB>30  Aspiration precautions    CV: htn, pulmonary edema   MAP>65, sbp <150  c/w CCB  continue labetalol (instead of ACEi) which was started inpatient   Takes Lisinopril (held for hyperK 4.7- 5.0 )   lasix initiated for pulmonary edema- can dc once no longer indicated  echo    TTE Echo Complete w/o Contrast w/ Doppler (10.09.24 @ 12:41) >  Summary:   1. Technically limited study.   2. Normal global left ventricular systolic function.   3. Left ventricular ejection fraction, by visual estimation, is 65 to   70%.   4. The left ventricular diastolic function could not be assessed in this   study.   5. Normal right ventricular size and function.   6. Mildly enlarged left atrium.  7. Sclerotic aortic valve with normal opening.   8. Mild tricuspid regurgitation.   9. Normal pulmonary artery pressure.      RENAL: CKD-  AGMA (MARCO on cKD)/NAGMA ((hyperchloremia)  / Hyperphostemia/ Metabolic acidosis secondary to CKD /hyperK  - H2Co3 1300 mg q8  _ can deescalate to home dose 650 mg q 8 as pt acid-base status improves  c/w po diuresis   lokelma initiated (pt on SPS outpatient)- qd-- can hold for hypokalemia  - Will monitor electrolytes   ocampo placed on 10/12- dc today    GI:  dysphagia/swallow- advance diet per swallow eval  GI prophylaxis [x] not indicated [] PPI   Bowel regimen [x] miralax [x] senna [] other:  LBM - 10/14    ENDO:  Prediabetic    ac/hs correction scale insulin   Goal -180  now off steroids  Hold glimepiride, jardiance, pioglitazone  a1c- 6.0   TSH- 2.68     HEME/ONC:  MDS   VTE prophylaxis: [x] SCDs [ [x] heparin 5 K q 8 SQ   venous dopplers b/l LEs- neg - 10/9/24   anemia due to MDS as well as worsening renal insufficiency- repeat today, if Hb <7 will transfuse  s/p 1 unit prbc overnight with good response, will repeat to ensure stability  no sequelae of bleeding  MDS hx (follows with Dr Wade)- - On revlimid and Vidaza as OPT previously NOW on Lusaracept.   LUE superficial thrombophlebitis on duplex from10/15--> elevate LUE and ice packs     ID:  monitor off abx  cxr on 10/12 with ?perihilar opacity on L mid lung zone   Cultures neg  PCT on 10/9 is 7.3--> repeat 1.04  UA negative  MRSA +- bactroban per protocol      SOCIAL/FAMILY:  [x] updated at bedside and over phone    PIV     CODE STATUS:  [x] Full Code     DISPOSITION:  [x] nsicu

## 2024-10-17 LAB
ANION GAP SERPL CALC-SCNC: 9 MMOL/L — SIGNIFICANT CHANGE UP (ref 7–14)
BASOPHILS # BLD AUTO: 0.02 K/UL — SIGNIFICANT CHANGE UP (ref 0–0.2)
BASOPHILS NFR BLD AUTO: 0.2 % — SIGNIFICANT CHANGE UP (ref 0–1)
BUN SERPL-MCNC: 62 MG/DL — CRITICAL HIGH (ref 10–20)
CALCIUM SERPL-MCNC: 8.6 MG/DL — SIGNIFICANT CHANGE UP (ref 8.4–10.5)
CHLORIDE SERPL-SCNC: 113 MMOL/L — HIGH (ref 98–110)
CO2 SERPL-SCNC: 23 MMOL/L — SIGNIFICANT CHANGE UP (ref 17–32)
CREAT SERPL-MCNC: 1.4 MG/DL — SIGNIFICANT CHANGE UP (ref 0.7–1.5)
EGFR: 39 ML/MIN/1.73M2 — LOW
EOSINOPHIL # BLD AUTO: 0.21 K/UL — SIGNIFICANT CHANGE UP (ref 0–0.7)
EOSINOPHIL NFR BLD AUTO: 2.5 % — SIGNIFICANT CHANGE UP (ref 0–8)
GLUCOSE BLDC GLUCOMTR-MCNC: 137 MG/DL — HIGH (ref 70–99)
GLUCOSE BLDC GLUCOMTR-MCNC: 241 MG/DL — HIGH (ref 70–99)
GLUCOSE BLDC GLUCOMTR-MCNC: 245 MG/DL — HIGH (ref 70–99)
GLUCOSE BLDC GLUCOMTR-MCNC: 269 MG/DL — HIGH (ref 70–99)
GLUCOSE SERPL-MCNC: 144 MG/DL — HIGH (ref 70–99)
HCT VFR BLD CALC: 25.1 % — LOW (ref 37–47)
HGB BLD-MCNC: 8.3 G/DL — LOW (ref 12–16)
IMM GRANULOCYTES NFR BLD AUTO: 5.4 % — HIGH (ref 0.1–0.3)
LYMPHOCYTES # BLD AUTO: 0.75 K/UL — LOW (ref 1.2–3.4)
LYMPHOCYTES # BLD AUTO: 8.8 % — LOW (ref 20.5–51.1)
MAGNESIUM SERPL-MCNC: 2 MG/DL — SIGNIFICANT CHANGE UP (ref 1.8–2.4)
MCHC RBC-ENTMCNC: 30.2 PG — SIGNIFICANT CHANGE UP (ref 27–31)
MCHC RBC-ENTMCNC: 33.1 G/DL — SIGNIFICANT CHANGE UP (ref 32–37)
MCV RBC AUTO: 91.3 FL — SIGNIFICANT CHANGE UP (ref 81–99)
MONOCYTES # BLD AUTO: 0.74 K/UL — HIGH (ref 0.1–0.6)
MONOCYTES NFR BLD AUTO: 8.7 % — SIGNIFICANT CHANGE UP (ref 1.7–9.3)
NEUTROPHILS # BLD AUTO: 6.34 K/UL — SIGNIFICANT CHANGE UP (ref 1.4–6.5)
NEUTROPHILS NFR BLD AUTO: 74.4 % — SIGNIFICANT CHANGE UP (ref 42.2–75.2)
NRBC # BLD: 0 /100 WBCS — SIGNIFICANT CHANGE UP (ref 0–0)
PHOSPHATE SERPL-MCNC: 3.9 MG/DL — SIGNIFICANT CHANGE UP (ref 2.1–4.9)
PLATELET # BLD AUTO: 155 K/UL — SIGNIFICANT CHANGE UP (ref 130–400)
PMV BLD: 11.6 FL — HIGH (ref 7.4–10.4)
POTASSIUM SERPL-MCNC: 4.4 MMOL/L — SIGNIFICANT CHANGE UP (ref 3.5–5)
POTASSIUM SERPL-SCNC: 4.4 MMOL/L — SIGNIFICANT CHANGE UP (ref 3.5–5)
PROCALCITONIN SERPL-MCNC: 0.48 NG/ML — HIGH (ref 0.02–0.1)
RBC # BLD: 2.75 M/UL — LOW (ref 4.2–5.4)
RBC # FLD: 16.4 % — HIGH (ref 11.5–14.5)
SODIUM SERPL-SCNC: 145 MMOL/L — SIGNIFICANT CHANGE UP (ref 135–146)
WBC # BLD: 8.52 K/UL — SIGNIFICANT CHANGE UP (ref 4.8–10.8)
WBC # FLD AUTO: 8.52 K/UL — SIGNIFICANT CHANGE UP (ref 4.8–10.8)

## 2024-10-17 PROCEDURE — 70450 CT HEAD/BRAIN W/O DYE: CPT | Mod: 26

## 2024-10-17 PROCEDURE — 71045 X-RAY EXAM CHEST 1 VIEW: CPT | Mod: 26

## 2024-10-17 RX ORDER — INSULIN LISPRO 100/ML
VIAL (ML) SUBCUTANEOUS
Refills: 0 | Status: DISCONTINUED | OUTPATIENT
Start: 2024-10-17 | End: 2024-10-27

## 2024-10-17 RX ORDER — INSULIN LISPRO 100/ML
4 VIAL (ML) SUBCUTANEOUS ONCE
Refills: 0 | Status: COMPLETED | OUTPATIENT
Start: 2024-10-17 | End: 2024-10-17

## 2024-10-17 RX ORDER — IPRATROPIUM BROMIDE AND ALBUTEROL SULFATE .5; 2.5 MG/3ML; MG/3ML
3 SOLUTION RESPIRATORY (INHALATION) EVERY 6 HOURS
Refills: 0 | Status: DISCONTINUED | OUTPATIENT
Start: 2024-10-17 | End: 2024-10-18

## 2024-10-17 RX ORDER — MUPIROCIN 20 MG/G
1 OINTMENT TOPICAL EVERY 12 HOURS
Refills: 0 | Status: COMPLETED | OUTPATIENT
Start: 2024-10-17 | End: 2024-10-22

## 2024-10-17 RX ORDER — HYDRALAZINE HYDROCHLORIDE 50 MG/1
5 TABLET, FILM COATED ORAL ONCE
Refills: 0 | Status: COMPLETED | OUTPATIENT
Start: 2024-10-17 | End: 2024-10-17

## 2024-10-17 RX ADMIN — HEPARIN SODIUM 5000 UNIT(S): 10000 INJECTION INTRAVENOUS; SUBCUTANEOUS at 13:03

## 2024-10-17 RX ADMIN — Medication 200 MILLIGRAM(S): at 06:27

## 2024-10-17 RX ADMIN — CHLORHEXIDINE GLUCONATE 1 APPLICATION(S): 40 SOLUTION TOPICAL at 06:28

## 2024-10-17 RX ADMIN — HYDRALAZINE HYDROCHLORIDE 5 MILLIGRAM(S): 50 TABLET, FILM COATED ORAL at 20:34

## 2024-10-17 RX ADMIN — Medication 1300 MILLIGRAM(S): at 06:27

## 2024-10-17 RX ADMIN — MUPIROCIN 1 APPLICATION(S): 20 OINTMENT TOPICAL at 18:33

## 2024-10-17 RX ADMIN — Medication 6: at 11:21

## 2024-10-17 RX ADMIN — IPRATROPIUM BROMIDE AND ALBUTEROL SULFATE 3 MILLILITER(S): .5; 2.5 SOLUTION RESPIRATORY (INHALATION) at 08:49

## 2024-10-17 RX ADMIN — MUPIROCIN 1 APPLICATION(S): 20 OINTMENT TOPICAL at 07:03

## 2024-10-17 RX ADMIN — Medication 1300 MILLIGRAM(S): at 22:19

## 2024-10-17 RX ADMIN — POLYETHYLENE GLYCOL 3350 17 GRAM(S): 17 POWDER, FOR SOLUTION ORAL at 11:24

## 2024-10-17 RX ADMIN — Medication 4: at 17:07

## 2024-10-17 RX ADMIN — HEPARIN SODIUM 5000 UNIT(S): 10000 INJECTION INTRAVENOUS; SUBCUTANEOUS at 22:20

## 2024-10-17 RX ADMIN — Medication 1300 MILLIGRAM(S): at 13:03

## 2024-10-17 RX ADMIN — Medication 200 MILLIGRAM(S): at 13:04

## 2024-10-17 RX ADMIN — HEPARIN SODIUM 5000 UNIT(S): 10000 INJECTION INTRAVENOUS; SUBCUTANEOUS at 06:27

## 2024-10-17 RX ADMIN — Medication 40 MILLIGRAM(S): at 07:04

## 2024-10-17 RX ADMIN — Medication 10 MILLIGRAM(S): at 06:27

## 2024-10-17 RX ADMIN — LEVETIRACETAM 500 MILLIGRAM(S): 500 TABLET, FILM COATED ORAL at 06:27

## 2024-10-17 RX ADMIN — Medication 4 UNIT(S): at 22:53

## 2024-10-17 RX ADMIN — LEVETIRACETAM 500 MILLIGRAM(S): 500 TABLET, FILM COATED ORAL at 17:07

## 2024-10-17 RX ADMIN — Medication 200 MILLIGRAM(S): at 22:20

## 2024-10-17 NOTE — CHART NOTE - NSCHARTNOTEFT_GEN_A_CORE
Transfer Note: Medical Floor to SDU    Signout given to:     Follow up:  1) Neurosurgery recs  2)       HPI / HOSPITAL COURSE:  79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 Per pt's daughter, pt was LKW yesterday evening. Daughter states this morning pt was not answering the phone and did not open the door for her home health aide. Daughter came to home and found her on the floor, pt was conscious and told daughter "I fell". On initial arrival to ED pt only EO to pain, not following commands, WD extremities. Underwent CTH showing R hemispheric SDH 2.2cm w/ mass effect and 1.1cm MLS. On reassessment in ED pt opening eyes to voice, answering some questions and following simple commands. SBP after CT in 200s, started on Nicardipine gtt. Received 1g IV Keppra, 1850mL LR bolus, and was started on insulin gtt for BP in 400s. Pt assessed by NSGY, going to OR for evacuation. Admitted to NSICU for further management.    On 10/08 pt went for NSG evacuation of R SDH (cranio. On 10/09 she went for R MMA embolization. Since then pt is being monitor in NICU and stable. She has       ASSESSMENT & PLAN:  ASSESSMENT/PLAN:  79F w/ PMHx of MDS, DM, HTN, CKD presents after fall at home, found to have R SDH.   s/p 3 s/p crani and hematoma evacuation and MMA embolization     NEURO:  q4h neurochecks  repeat CTH - stable as aBOVE   mONITOR Worsening leak   Scalp wrapped per NSG   NSGY following  analgesia- tylenol and tramadol  Activity: [X] oobtc with assist if not agitated  PT/OT    PULM: s/p Resp failure secondary to neuro injury/pulmonary edema/?stridor  RA - sat > 92 %  c/w lasix, duonebs q6 prn wheezing   HOB>30  Aspiration precautions    CV: htn, S/P  pulmonary edema   MAP>65, sbp <150  c/w CCB  labetalol (instead of ACEi) which was started inpatient   Takes Lisinopril (held for hyperK 4.7- 5.0 ) - if stable K would resume and D/C labetolol   Repeat CXR - determine need for lasix   echo    TTE Echo Complete w/o Contrast w/ Doppler (10.09.24 @ 12:41) >  Summary:   1. Technically limited study.   2. Normal global left ventricular systolic function.   3. Left ventricular ejection fraction, by visual estimation, is 65 to   70%.   4. The left ventricular diastolic function could not be assessed in this   study.   5. Normal right ventricular size and function.   6. Mildly enlarged left atrium.  7. Sclerotic aortic valve with normal opening.   8. Mild tricuspid regurgitation.   9. Normal pulmonary artery pressure.      RENAL: CKD-  AGMA (MARCO on cKD)/NAGMA ((hyperchloremia)  / Hyperphostemia/ Metabolic acidosis secondary to CKD /hyperK  - H2Co3 1300 mg q8  _ can deescalate to home dose 650 mg q 8 as pt acid-base status improves  - evaluate q day for need of diuresis   - Lokelma initiated  prn (pt on SPS outpatient)- qd-- can hold for hypokalemia  - Will monitor electrolytes  - Bladder scanning q 6    - No ocampo     GI:  Carb consistent - low Na - easy to chew   GI prophylaxis [x] not indicated   Bowel regimen [x] miralax [x] senna [] other:  LBM - 10/17    ENDO:  Prediabetic    ac/hs correction scale insulin   Goal -180  now off steroids  Hold glimepiride, jardiance, pioglitazone  a1c- 6.0   TSH- 2.68     HEME/ONC:  MDS   VTE prophylaxis: [x] SCDs [ [x] heparin 5 K q 8 SQ   venous dopplers b/l LEs- neg - 10/9/24   anemia due to MDS as well as worsening renal insufficiency- repeat today, if Hb <7 will transfuse  s/p 1 unit prbc  10/16/24 - F/U HGB 8.3 ( 6.3 )   no sequelae of bleeding  MDS hx (follows with Dr Wade)- - On revlimid and Vidaza as OPT previously NOW on Lusaracept.   LUE superficial thrombophlebitis on duplex from10/15--> elevate LUE - warm pack and elevated L arm above heart      ID:  monitor off abx  F/U subgaleal fluid   cxr on 10/12 with ?perihilar opacity on L mid lung zone   Cultures neg  PCT on 10/9 is 7.3--> repeat 1.04  UA negative  MRSA +- bactroban per protocol- Day 3/7      SOCIAL/FAMILY:  [x] updated at bedside and over phone    PIV     CODE STATUS: Full   [x] Full Code     DISPOSITION:  [x] CEU Transfer Note: Medical Floor to SDU    Signout given to:     Follow up:  1) Neurosurgery recs  2) Daily CBC      HPI / HOSPITAL COURSE:  79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 Per pt's daughter, pt was LKW yesterday evening. Daughter states this morning pt was not answering the phone and did not open the door for her home health aide. Daughter came to home and found her on the floor, pt was conscious and told daughter "I fell". On initial arrival to ED pt only EO to pain, not following commands, WD extremities. Underwent CTH showing R hemispheric SDH 2.2cm w/ mass effect and 1.1cm MLS. On reassessment in ED pt opening eyes to voice, answering some questions and following simple commands. SBP after CT in 200s, started on Nicardipine gtt. Received 1g IV Keppra, 1850mL LR bolus, and was started on insulin gtt for BP in 400s. Pt assessed by NSGY, going to OR for evacuation. Admitted to NSICU for further management.    On 10/08 pt went for NSG evacuation of R SDH (cranio. On 10/09 she went for R MMA embolization. Since then pt is being monitor in NICU and stable. On 10/16, pt was found to have leak from the crani site, STAT CTH showed increase in SDH and extracalvarial collection, NSG removed 25cc of blood and was re-sutured. Pt stable today with stable CTH scan on 10/17 AM. She has chronic high potassium and on SPS at home and also ACE.ARB which was stopped here that controlled her potassium but lokelma ordered PRN. She received 1 U of PRBC on 10/16 due to Hb <7, she has chronic anemia and has MDS. No acute bleeding noted other than SDH. She is currently stable to downgrade to SDU.       ASSESSMENT & PLAN:  ASSESSMENT/PLAN:  79F w/ PMHx of MDS, DM, HTN, CKD presents after fall at home, found to have R SDH.   s/p 3 s/p crani and hematoma evacuation and MMA embolization     NEURO:  q4h neurochecks  repeat CTH - stable as aBOVE   mONITOR Worsening leak   Scalp wrapped per NSG   NSGY following  analgesia- tylenol and tramadol  Activity: [X] oobtc with assist if not agitated  PT/OT    PULM: s/p Resp failure secondary to neuro injury/pulmonary edema/?stridor  RA - sat > 92 %  c/w lasix, duonebs q6 prn wheezing   HOB>30  Aspiration precautions    CV: htn, S/P  pulmonary edema   MAP>65, sbp <150  c/w CCB  labetalol (instead of ACEi) which was started inpatient   Takes Lisinopril (held for hyperK 4.7- 5.0 ) - if stable K would resume and D/C labetolol   Repeat CXR - determine need for lasix   echo    TTE Echo Complete w/o Contrast w/ Doppler (10.09.24 @ 12:41) >  Summary:   1. Technically limited study.   2. Normal global left ventricular systolic function.   3. Left ventricular ejection fraction, by visual estimation, is 65 to   70%.   4. The left ventricular diastolic function could not be assessed in this   study.   5. Normal right ventricular size and function.   6. Mildly enlarged left atrium.  7. Sclerotic aortic valve with normal opening.   8. Mild tricuspid regurgitation.   9. Normal pulmonary artery pressure.      RENAL: CKD-  AGMA (MARCO on cKD)/NAGMA ((hyperchloremia)  / Hyperphostemia/ Metabolic acidosis secondary to CKD /hyperK  - H2Co3 1300 mg q8  _ can deescalate to home dose 650 mg q 8 as pt acid-base status improves  - evaluate q day for need of diuresis   - Lokelma initiated  prn (pt on SPS outpatient)- qd-- can hold for hypokalemia  - Will monitor electrolytes  - Bladder scanning q 6    - No ocampo     GI:  Carb consistent - low Na - easy to chew   GI prophylaxis [x] not indicated   Bowel regimen [x] miralax [x] senna [] other:  LBM - 10/17    ENDO:  Prediabetic    ac/hs correction scale insulin   Goal -180  now off steroids  Hold glimepiride, jardiance, pioglitazone  a1c- 6.0   TSH- 2.68     HEME/ONC:  MDS   VTE prophylaxis: [x] SCDs [ [x] heparin 5 K q 8 SQ   venous dopplers b/l LEs- neg - 10/9/24   anemia due to MDS as well as worsening renal insufficiency- repeat today, if Hb <7 will transfuse  s/p 1 unit prbc  10/16/24 - F/U HGB 8.3 ( 6.3 )   no sequelae of bleeding  MDS hx (follows with Dr Wade)- - On revlimid and Vidaza as OPT previously NOW on Lusaracept.   LUE superficial thrombophlebitis on duplex from10/15--> elevate LUE - warm pack and elevated L arm above heart      ID:  monitor off abx  F/U subgaleal fluid   cxr on 10/12 with ?perihilar opacity on L mid lung zone   Cultures neg  PCT on 10/9 is 7.3--> repeat 1.04  UA negative  MRSA +- bactroban per protocol- Day 3/7      SOCIAL/FAMILY:  [x] updated at bedside and over phone    PIV     CODE STATUS: Full   [x] Full Code     DISPOSITION:  [x] CEU Transfer Note: NICU to SDU    Signout given to:     Follow up:  1) Neurosurgery recs  2) Daily CBC  3) bladder scans q6 for retention  4) neuro checks q4      HPI / HOSPITAL COURSE:  79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 Per pt's daughter, pt was LKW yesterday evening. Daughter states this morning pt was not answering the phone and did not open the door for her home health aide. Daughter came to home and found her on the floor, pt was conscious and told daughter "I fell". On initial arrival to ED pt only EO to pain, not following commands, WD extremities. Underwent CTH showing R hemispheric SDH 2.2cm w/ mass effect and 1.1cm MLS. On reassessment in ED pt opening eyes to voice, answering some questions and following simple commands. SBP after CT in 200s, started on Nicardipine gtt. Received 1g IV Keppra, 1850mL LR bolus, and was started on insulin gtt for BP in 400s. Pt assessed by NSGY, going to OR for evacuation. Admitted to NSICU for further management.    On 10/08 pt went for NSG evacuation of R SDH (cranio. On 10/09 she went for R MMA embolization. Since then pt is being monitor in NICU and stable. On 10/16, pt was found to have leak from the crani site, STAT CTH showed increase in SDH and extracalvarial collection, NSG removed 25cc of blood and was re-sutured. Pt stable today with stable CTH scan on 10/17 AM. She has chronic high potassium and on SPS at home and also ACE.ARB which was stopped here that controlled her potassium but lokelma ordered PRN. She received 1 U of PRBC on 10/16 due to Hb <7, she has chronic anemia and has MDS. No acute bleeding noted other than SDH. Pt was also found to be reteaining urine on 10/17 around 800ml, but voided by herself, will rec c/w bladder scans for now. DC A-line on 10/17. She is currently stable to downgrade to SDU.       ASSESSMENT & PLAN:  ASSESSMENT/PLAN:  79F w/ PMHx of MDS, DM, HTN, CKD presents after fall at home, found to have R SDH.   s/p 3 s/p crani and hematoma evacuation and MMA embolization     NEURO:  q4h neurochecks  repeat CTH - stable as aBOVE   mONITOR Worsening leak   Scalp wrapped per NSG   NSGY following  analgesia- tylenol and tramadol  Activity: [X] oobtc with assist if not agitated  PT/OT    PULM: s/p Resp failure secondary to neuro injury/pulmonary edema/?stridor  RA - sat > 92 %  c/w lasix, duonebs q6 prn wheezing   HOB>30  Aspiration precautions    CV: htn, S/P  pulmonary edema   MAP>65, sbp <150  c/w CCB  labetalol (instead of ACEi) which was started inpatient   Takes Lisinopril (held for hyperK 4.7- 5.0 ) - if stable K would resume and D/C labetolol   Repeat CXR - determine need for lasix   echo    TTE Echo Complete w/o Contrast w/ Doppler (10.09.24 @ 12:41) >  Summary:   1. Technically limited study.   2. Normal global left ventricular systolic function.   3. Left ventricular ejection fraction, by visual estimation, is 65 to   70%.   4. The left ventricular diastolic function could not be assessed in this   study.   5. Normal right ventricular size and function.   6. Mildly enlarged left atrium.  7. Sclerotic aortic valve with normal opening.   8. Mild tricuspid regurgitation.   9. Normal pulmonary artery pressure.      RENAL: CKD-  AGMA (MARCO on cKD)/NAGMA ((hyperchloremia)  / Hyperphostemia/ Metabolic acidosis secondary to CKD /hyperK  - H2Co3 1300 mg q8  _ can deescalate to home dose 650 mg q 8 as pt acid-base status improves  - evaluate q day for need of diuresis   - Lokelma initiated  prn (pt on SPS outpatient)- qd-- can hold for hypokalemia  - Will monitor electrolytes  - Bladder scanning q 6    - No ocampo     GI:  Carb consistent - low Na - easy to chew   GI prophylaxis [x] not indicated   Bowel regimen [x] miralax [x] senna [] other:  LBM - 10/17    ENDO:  Prediabetic    ac/hs correction scale insulin   Goal -180  now off steroids  Hold glimepiride, jardiance, pioglitazone  a1c- 6.0   TSH- 2.68     HEME/ONC:  MDS   VTE prophylaxis: [x] SCDs [ [x] heparin 5 K q 8 SQ   venous dopplers b/l LEs- neg - 10/9/24   anemia due to MDS as well as worsening renal insufficiency- repeat today, if Hb <7 will transfuse  s/p 1 unit prbc  10/16/24 - F/U HGB 8.3 ( 6.3 )   no sequelae of bleeding  MDS hx (follows with Dr Wade)- - On revlimid and Vidaza as OPT previously NOW on Lusaracept.   LUE superficial thrombophlebitis on duplex from10/15--> elevate LUE - warm pack and elevated L arm above heart      ID:  monitor off abx  F/U subgaleal fluid   cxr on 10/12 with ?perihilar opacity on L mid lung zone   Cultures neg  PCT on 10/9 is 7.3--> repeat 1.04  UA negative  MRSA +- bactroban per protocol- Day 3/7      SOCIAL/FAMILY:  [x] updated at bedside and over phone    PIV     CODE STATUS: Full   [x] Full Code     DISPOSITION:  [x] CEU

## 2024-10-17 NOTE — PROGRESS NOTE ADULT - SUBJECTIVE AND OBJECTIVE BOX
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 Per pt's daughter, pt was LKW - 10/7/24  evening. Daughter states this morning pt was not answering the phone and did not open the door for her home health aide. Daughter came to home and found her on the floor, pt was conscious and told daughter "I fell". On initial arrival to ED pt only EO to pain, not following commands, WD extremities. Underwent CTH showing R hemispheric SDH 2.2cm w/ mass effect and 1.1cm MLS. On reassessment in ED pt opening eyes to voice, answering some questions and following simple commands. SBP after CT in 200s, started on Nicardipine gtt. Received 1g IV Keppra, 1850mL LR bolus, and was started on insulin gtt for BP in 400s. Pt assessed by NSGY, going to OR for evacuation. Admitted to NSICU for further management.    Overnight - stable exam and respiratory status, given 1 unit prbc    On admission   GCS 15       - s/p craniotomy and hematoma evacuation with improving MLS c/b by postop mild R SD recollection               S/P 4 U PRBC / i FFP/i PLT               Intubated    S/P MMA      VITALS: [x] Reviewed      EXAMINATION:  General: WN/WD, no acute distress, hypophonic; on hfnc and satup and comfortable   HENT: NC/AT, moist oral mucosa  Eyes: Anicteric sclerae  Cardiac: [id number]  Lungs: Clear  Abdomen: Soft, non-tender, +BS  Extremities: +1 UE/LE edema  Skin/Incision Site: Clean, dry and intact (but pillow with discoloration concerning for incision site leak  Neurologic: awake alert oriented x 3, hypophonic, dysarthric, mild facial, midline gaze, perrl, b/l UEs no drift and AG and b/l LEs no drift and AG, follows all commands           ICU Vital Signs Last 24 Hrs  T(C): 36.6 (17 Oct 2024 00:00), Max: 36.6 (17 Oct 2024 00:00)  T(F): 97.9 (17 Oct 2024 00:00), Max: 97.9 (17 Oct 2024 00:00)  HR: 78 (17 Oct 2024 05:00) (55 - 79)  BP: 144/61 (17 Oct 2024 04:00) (112/81 - 174/70)  BP(mean): 88 (17 Oct 2024 04:00) (77 - 105)  ABP: 154/49 (17 Oct 2024 05:00) (124/47 - 172/68)  ABP(mean): 89 (17 Oct 2024 05:00) (74 - 109)  RR: 31 (17 Oct 2024 05:00) (12 - 31)  SpO2: 90% (17 Oct 2024 05:00) (90% - 100%)    O2 Parameters below as of 17 Oct 2024 00:00  Patient On (Oxygen Delivery Method): nasal cannula  O2 Flow (L/min): 3        16 Oct 2024 07:01  -  17 Oct 2024 07:00  --------------------------------------------------------  IN:  Total IN: 0 mL    OUT:    Indwelling Catheter - Urethral (mL): 400 mL    Voided (mL): 1100 mL  Total OUT: 1500 mL    Total NET: -1500 mL      MEDICATIONS  (STANDING):  albuterol/ipratropium for Nebulization 3 milliLiter(s) Nebulizer every 6 hours  amLODIPine   Tablet 10 milliGRAM(s) Oral daily  chlorhexidine 2% Cloths 1 Application(s) Topical <User Schedule>  dextrose 50% Injectable 25 milliLiter(s) IV Push every 15 minutes  dextrose 50% Injectable 50 milliLiter(s) IV Push every 15 minutes  dextrose 50% Injectable 25 milliLiter(s) IV Push every 15 minutes  furosemide    Tablet 40 milliGRAM(s) Oral daily  heparin   Injectable 5000 Unit(s) SubCutaneous every 8 hours  insulin lispro (ADMELOG) corrective regimen sliding scale   SubCutaneous every 6 hours  labetalol 200 milliGRAM(s) Oral every 8 hours  levETIRAcetam 500 milliGRAM(s) Oral two times a day  mupirocin 2% Ointment 1 Application(s) Topical every 12 hours  niCARdipine Infusion 5 mG/Hr (25 mL/Hr) IV Continuous <Continuous>  polyethylene glycol 3350 17 Gram(s) Oral daily  senna 2 Tablet(s) Oral at bedtime  sodium bicarbonate 1300 milliGRAM(s) Oral every 8 hours    MEDICATIONS  (PRN):  acetaminophen     Tablet .. 975 milliGRAM(s) Oral every 6 hours PRN Temp greater or equal to 38C (100.4F), Mild Pain (1 - 3)  OLANZapine Injectable 2.5 milliGRAM(s) IntraMuscular every 4 hours PRN agitation  sodium zirconium cyclosilicate 5 Gram(s) Oral every 24 hours PRN Potassium > 4.8  traMADol 50 milliGRAM(s) Oral every 6 hours PRN Severe Pain (7 - 10)  traMADol 25 milliGRAM(s) Oral every 6 hours PRN Moderate Pain (4 - 6)      10-17    145  |  113[H]  |  62[HH]  ----------------------------<  144[H]  4.4   |  23  |  1.4    Ca    8.6      17 Oct 2024 05:20  Phos  3.9     10-17  Mg     2.0     10-17    TPro  5.0[L]  /  Alb  3.6  /  TBili  0.9  /  DBili  x   /  AST  21  /  ALT  28  /  AlkPhos  89  10-16                            8.3    8.52  )-----------( 155      ( 17 Oct 2024 05:20 )             25.1           LABS:  Na: 144 (10-16 @ 04:52), 142 (10-15 @ 04:35), 145 (10-14 @ 23:05), 144 (10-14 @ 21:17), 148 (10-14 @ 10:42), 145 (10-14 @ 06:05)  K: 4.5 (10-16 @ 04:52), 4.6 (10-15 @ 04:35), 5.0 (10-14 @ 23:05), 4.4 (10-14 @ 21:17), 5.2 (10-14 @ 10:42), 5.2 (10-14 @ 06:05)  Cl: 114 (10-16 @ 04:52), 117 (10-15 @ 04:35), 117 (10-14 @ 23:05), 115 (10-14 @ 21:17), 119 (10-14 @ 10:42), 122 (10-14 @ 06:05)  CO2: 20 (10-16 @ 04:52), 17 (10-15 @ 04:35), 16 (10-14 @ 23:05), 15 (10-14 @ 21:17), 12 (10-14 @ 10:42), 14 (10-14 @ 06:05)  BUN: 70 (10-16 @ 04:52), 71 (10-15 @ 04:35), 73 (10-14 @ 23:05), 73 (10-14 @ 21:17), 67 (10-14 @ 10:42), 63 (10-14 @ 06:05)  Cr: 1.5 (10-16 @ 04:52), 1.6 (10-15 @ 04:35), 1.7 (10-14 @ 23:05), 1.7 (10-14 @ 21:17), 1.5 (10-14 @ 10:42), 1.5 (10-14 @ 06:05)  Glu: 62(10-16 @ 04:52), 139(10-15 @ 04:35), 143(10-14 @ 23:05), 166(10-14 @ 21:17), 263(10-14 @ 10:42), 173(10-14 @ 06:05)    Hgb: 8.1 (10-16 @ 04:52), 6.7 (10-16 @ 00:03), 6.8 (10-15 @ 16:58), 6.6 (10-15 @ 06:25), 6.3 (10-15 @ 04:35), 7.4 (10-14 @ 06:05)  Hct: 24.3 (10-16 @ 04:52), 20.7 (10-16 @ 00:03), 21.1 (10-15 @ 16:58), 20.1 (10-15 @ 06:25), 19.6 (10-15 @ 04:35), 23.1 (10-14 @ 06:05)  WBC: 10.43 (10-16 @ 04:52), 7.83 (10-16 @ 00:03), 7.26 (10-15 @ 16:58), 8.68 (10-15 @ 06:25), 7.88 (10-15 @ 04:35), 18.26 (10-14 @ 06:05)  Plt: 187 (10-16 @ 04:52), 182 (10-16 @ 00:03), 192 (10-15 @ 16:58), 204 (10-15 @ 06:25), 196 (10-15 @ 04:35), 304 (10-14 @ 06:05)    INR:   PTT:           LIVER FUNCTIONS - ( 16 Oct 2024 04:52 )  Alb: 3.6 g/dL / Pro: 5.0 g/dL / ALK PHOS: 89 U/L / ALT: 28 U/L / AST: 21 U/L / GGT: x           ABG - ( 15 Oct 2024 09:57 )  pH, Arterial: 7.30  pH, Blood: x     /  pCO2: 32    /  pO2: 117   / HCO3: 16    / Base Excess: -9.8  /  SaO2: 97.4          Imaging reviewed                                                        SUMMARY: 79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 Per pt's daughter, pt was LKW - 10/7/24  evening. Daughter states this morning pt was not answering the phone and did not open the door for her home health aide. Daughter came to home and found her on the floor, pt was conscious and told daughter "I fell". On initial arrival to ED pt only EO to pain, not following commands, WD extremities. Underwent CTH showing R hemispheric SDH 2.2cm w/ mass effect and 1.1cm MLS. On reassessment in ED pt opening eyes to voice, answering some questions and following simple commands. SBP after CT in 200s, started on Nicardipine gtt. Received 1g IV Keppra, 1850mL LR bolus, and was started on insulin gtt for BP in 400s. Pt assessed by NSGY, going to OR for evacuation. Admitted to NSICU for further management.      POD #9  s/p craniotomy and hematoma evacuation with improving MLS c/b by postop mild R SD recollection               S/P 4 U PRBC / i FFP/i PLT               Intubated   POD # * - MMA       Last 24 hrs  - Removed 35 cc fro R subgaleal collection     On admission   GCS 15         EXAMINATION:  General: WN/WD, no acute distress, hypophonic; on hfnc and satup and comfortable   HENT: NC/AT, moist oral mucosa  Eyes: Anicteric sclerae  Cardiac: [id number]  Lungs: Clear  Abdomen: Soft, non-tender, +BS  Extremities: +1 UE/LE edema  Skin/Incision Site: Clean, dry and intact (but pillow with discoloration concerning for incision site leak  Neurologic: awake alert oriented x 3, hypophonic, dysarthric, mild facial, midline gaze, perrl, b/l UEs no drift and AG and b/l LEs no drift and AG, follows all commands       ICU Vital Signs Last 24 Hrs  T(C): 36.7 (17 Oct 2024 12:00), Max: 36.7 (17 Oct 2024 12:00)  T(F): 98.1 (17 Oct 2024 12:00), Max: 98.1 (17 Oct 2024 12:00)  HR: 60 (17 Oct 2024 14:00) (55 - 83)  BP: 148/65 (17 Oct 2024 11:00) (97/53 - 171/67)  BP(mean): 94 (17 Oct 2024 11:00) (62 - 105)  ABP: 126/45 (17 Oct 2024 14:00) (124/47 - 157/53)  ABP(mean): 72 (17 Oct 2024 14:00) (72 - 91)  RR: 14 (17 Oct 2024 14:00) (12 - 33)  SpO2: 99% (17 Oct 2024 14:00)     O2 Parameters below as of 17 Oct 2024 12:00  Patient On (Oxygen Delivery Method): nasal cannula  O2 Flow (L/min): 2        16 Oct 2024 07:01  -  17 Oct 2024 07:00  --------------------------------------------------------  IN:  Total IN: 0 mL    OUT:    Indwelling Catheter - Urethral (mL): 400 mL    Voided (mL): 1100 mL  Total OUT: 1500 mL    Total NET: -1500 mL      MEDICATIONS  (STANDING):  albuterol/ipratropium for Nebulization 3 milliLiter(s) Nebulizer every 6 hours  amLODIPine   Tablet 10 milliGRAM(s) Oral daily  chlorhexidine 2% Cloths 1 Application(s) Topical <User Schedule>  dextrose 50% Injectable 25 milliLiter(s) IV Push every 15 minutes  dextrose 50% Injectable 50 milliLiter(s) IV Push every 15 minutes  dextrose 50% Injectable 25 milliLiter(s) IV Push every 15 minutes  furosemide    Tablet 40 milliGRAM(s) Oral daily  heparin   Injectable 5000 Unit(s) SubCutaneous every 8 hours  insulin lispro (ADMELOG) corrective regimen sliding scale   SubCutaneous every 6 hours  labetalol 200 milliGRAM(s) Oral every 8 hours  levETIRAcetam 500 milliGRAM(s) Oral two times a day  mupirocin 2% Ointment 1 Application(s) Topical every 12 hours  polyethylene glycol 3350 17 Gram(s) Oral daily  senna 2 Tablet(s) Oral at bedtime  sodium bicarbonate 1300 milliGRAM(s) Oral every 8 hours    MEDICATIONS  (PRN):  acetaminophen     Tablet .. 975 milliGRAM(s) Oral every 6 hours PRN Temp greater or equal to 38C (100.4F), Mild Pain (1 - 3)  OLANZapine Injectable 2.5 milliGRAM(s) IntraMuscular every 4 hours PRN agitation  sodium zirconium cyclosilicate 5 Gram(s) Oral every 24 hours PRN Potassium > 4.8  traMADol 50 milliGRAM(s) Oral every 6 hours PRN Severe Pain (7 - 10)  traMADol 25 milliGRAM(s) Oral every 6 hours PRN Moderate Pain (4 - 6)      10-17    145  |  113[H]  |  62[HH]  ----------------------------<  144[H]  4.4   |  23  |  1.4    Ca    8.6      17 Oct 2024 05:20  Phos  3.9     10-17  Mg     2.0     10-17    TPro  5.0[L]  /  Alb  3.6  /  TBili  0.9  /  DBili  x   /  AST  21  /  ALT  28  /  AlkPhos  89  10-16                            8.3    8.52  )-----------( 155      ( 17 Oct 2024 05:20 )             25.1           LABS:  Na: 144 (10-16 @ 04:52), 142 (10-15 @ 04:35), 145 (10-14 @ 23:05), 144 (10-14 @ 21:17), 148 (10-14 @ 10:42), 145 (10-14 @ 06:05)  K: 4.5 (10-16 @ 04:52), 4.6 (10-15 @ 04:35), 5.0 (10-14 @ 23:05), 4.4 (10-14 @ 21:17), 5.2 (10-14 @ 10:42), 5.2 (10-14 @ 06:05)  Cl: 114 (10-16 @ 04:52), 117 (10-15 @ 04:35), 117 (10-14 @ 23:05), 115 (10-14 @ 21:17), 119 (10-14 @ 10:42), 122 (10-14 @ 06:05)  CO2: 20 (10-16 @ 04:52), 17 (10-15 @ 04:35), 16 (10-14 @ 23:05), 15 (10-14 @ 21:17), 12 (10-14 @ 10:42), 14 (10-14 @ 06:05)  BUN: 70 (10-16 @ 04:52), 71 (10-15 @ 04:35), 73 (10-14 @ 23:05), 73 (10-14 @ 21:17), 67 (10-14 @ 10:42), 63 (10-14 @ 06:05)  Cr: 1.5 (10-16 @ 04:52), 1.6 (10-15 @ 04:35), 1.7 (10-14 @ 23:05), 1.7 (10-14 @ 21:17), 1.5 (10-14 @ 10:42), 1.5 (10-14 @ 06:05)  Glu: 62(10-16 @ 04:52), 139(10-15 @ 04:35), 143(10-14 @ 23:05), 166(10-14 @ 21:17), 263(10-14 @ 10:42), 173(10-14 @ 06:05)    Hgb: 8.1 (10-16 @ 04:52), 6.7 (10-16 @ 00:03), 6.8 (10-15 @ 16:58), 6.6 (10-15 @ 06:25), 6.3 (10-15 @ 04:35), 7.4 (10-14 @ 06:05)  Hct: 24.3 (10-16 @ 04:52), 20.7 (10-16 @ 00:03), 21.1 (10-15 @ 16:58), 20.1 (10-15 @ 06:25), 19.6 (10-15 @ 04:35), 23.1 (10-14 @ 06:05)  WBC: 10.43 (10-16 @ 04:52), 7.83 (10-16 @ 00:03), 7.26 (10-15 @ 16:58), 8.68 (10-15 @ 06:25), 7.88 (10-15 @ 04:35), 18.26 (10-14 @ 06:05)  Plt: 187 (10-16 @ 04:52), 182 (10-16 @ 00:03), 192 (10-15 @ 16:58), 204 (10-15 @ 06:25), 196 (10-15 @ 04:35), 304 (10-14 @ 06:05)          LIVER FUNCTIONS - ( 16 Oct 2024 04:52 )  Alb: 3.6 g/dL / Pro: 5.0 g/dL / ALK PHOS: 89 U/L / ALT: 28 U/L / AST: 21 U/L / GGT: x           ABG - ( 15 Oct 2024 09:57 )  pH, Arterial: 7.30  pH, Blood: x     /  pCO2: 32    /  pO2: 117   / HCO3: 16    / Base Excess: -9.8  /  SaO2: 97.4          Imaging reviewed      CT Head No Cont (10.17.24 @ 05:41) >    IMPRESSION:    1.  Stable exam since the CT head performed on the prior day.    2.  Right cerebral convexity subdural hemorrhage measuring up to 12 mm in   width.    3.  Left cerebral convexity predominantly low-density collection   measuring up to 7 mm in width.    4.  Right frontoparietal craniotomy changes.      CSF - Gram stain Neg   Cell count - 1775  RBC - 262,000       EXAMINATION:  General: Oriented and alert x3 , name , location , month and year , no acute distress, hypophonic; on hfnc and satup and comfortable   HENT: NC/AT, moist oral mucosa  Eyes: Anicteric sclerae  Cardiac: [id number]  Lungs: Clear  Abdomen: Soft, non-tender, +BS  Extremities: +1 UE/LE edema  Skin/Incision Site: Clean, dry and intact - no leak noted at surg site leak  Neurologic:  hypophonic, dysarthric, mild L  facial, midline gaze, tracks B/L  perrl- 2.5 mm    LUE - drift and RUE - 4+/5   RLE - prox 2/5 , Knee flex/ext- 3/5 / DF/PF - 4-/5   LLE - prox- 2/5 and Knee flexion /ext 4-/5  , DF/PF - 4-/5 Sensation - intact and equal

## 2024-10-17 NOTE — PROGRESS NOTE ADULT - SUBJECTIVE AND OBJECTIVE BOX
S/P Right craniotomy for evac of SDH 10.8.24  S/P MMA Embolization 10.9.24    Pt seen and examined at bedside. Pt sleepy but arousable. No complaints.    Vital Signs Last 24 Hrs  T(C): 35.8 (17 Oct 2024 08:00), Max: 36.6 (17 Oct 2024 00:00)  T(F): 96.5 (17 Oct 2024 08:00), Max: 97.9 (17 Oct 2024 00:00)  HR: 63 (17 Oct 2024 09:00) (55 - 83)  BP: 141/63 (17 Oct 2024 09:00) (97/53 - 171/67)  BP(mean): 90 (17 Oct 2024 09:00) (62 - 105)  RR: 16 (17 Oct 2024 09:00) (12 - 31)  SpO2: 99% (17 Oct 2024 09:00) (86% - 100%)    Parameters below as of 17 Oct 2024 08:00  Patient On (Oxygen Delivery Method): nasal cannula  O2 Flow (L/min): 2      I&O's Detail    16 Oct 2024 07:01  -  17 Oct 2024 07:00  --------------------------------------------------------  IN:    Oral Fluid: 100 mL  Total IN: 100 mL    OUT:    Indwelling Catheter - Urethral (mL): 400 mL    NiCARdipine: 0 mL    Voided (mL): 2300 mL  Total OUT: 2700 mL    Total NET: -2600 mL        I&O's Summary    16 Oct 2024 07:01  -  17 Oct 2024 07:00  --------------------------------------------------------  IN: 100 mL / OUT: 2700 mL / NET: -2600 mL        REVIEW OF SYSTEMS    [ ] A ten-point review of systems was otherwise negative except as noted.  [X] Due to altered mental status/intubation, subjective information were not able to be obtained from the patient. History was obtained, to the extent possible, from review of the chart and collateral sources of information.      PHYSICAL EXAM:  Neurological:  Sleepy but arousable  Pupils pinpoint  minimal tracking  SEVERINO to commands  Incision intact - pressure dressing replaced  Mild boggy area to right side        LABS:                        8.3    8.52  )-----------( 155      ( 17 Oct 2024 05:20 )             25.1     10-17    145  |  113[H]  |  62[HH]  ----------------------------<  144[H]  4.4   |  23  |  1.4    Ca    8.6      17 Oct 2024 05:20  Phos  3.9     10-17  Mg     2.0     10-17    TPro  5.0[L]  /  Alb  3.6  /  TBili  0.9  /  DBili  x   /  AST  21  /  ALT  28  /  AlkPhos  89  10-16      Urinalysis Basic - ( 17 Oct 2024 05:20 )    Color: x / Appearance: x / SG: x / pH: x  Gluc: 144 mg/dL / Ketone: x  / Bili: x / Urobili: x   Blood: x / Protein: x / Nitrite: x   Leuk Esterase: x / RBC: x / WBC x   Sq Epi: x / Non Sq Epi: x / Bacteria: x          CAPILLARY BLOOD GLUCOSE      POCT Blood Glucose.: 137 mg/dL (17 Oct 2024 06:36)  POCT Blood Glucose.: 169 mg/dL (16 Oct 2024 23:29)  POCT Blood Glucose.: 160 mg/dL (16 Oct 2024 17:10)  POCT Blood Glucose.: 121 mg/dL (16 Oct 2024 11:07)      CSF Analysis: [] N/A  RBC Count - Spinal Fluid: 367820 /uL (10-16 @ 15:19)  CSF Lymphocytes: 3 % (10-16 @ 15:19)  Protein, CSF: 562 mg/dL (10-16 @ 15:19)  Glucose, CSF: 61 mg/dL (10-16 @ 15:19)      Allergies    No Known Allergies    Intolerances      MEDICATIONS:  Antibiotics:    Neuro:  acetaminophen     Tablet .. 975 milliGRAM(s) Oral every 6 hours PRN  levETIRAcetam 500 milliGRAM(s) Oral two times a day  OLANZapine Injectable 2.5 milliGRAM(s) IntraMuscular every 4 hours PRN  traMADol 25 milliGRAM(s) Oral every 6 hours PRN  traMADol 50 milliGRAM(s) Oral every 6 hours PRN      IVF:  sodium bicarbonate 1300 milliGRAM(s) Oral every 8 hours      CULTURES:  Culture Results:   No growth at 5 days (10-08 @ 11:14)  Culture Results:   No growth at 5 days (10-08 @ 11:14)    ASSESSMENT:  79y Female s/p    Traumatic subdural hemorrhage without loss of consciousness, initial encounter    NOMD    Abnormal finding of blood chemistry, unspecified    Acute kidney failure, unspecified    Anemia, unspecified    Chronic cough    Chronic kidney disease, unspecified    Cough, unspecified    Cutaneous abscess of back [any part, except buttock]    Cutaneous abscess of limb, unspecified    Deficiency of other specified B group vitamins    Diarrhea, unspecified    Encounter for antineoplastic chemotherapy    Essential (primary) hypertension    FALL (ON) (FROM) UNSPECIFIED STAIRS AND STEPS, INIT ENCNTR    Fever, unspecified    History of falling    Hypomagnesemia    Long term (current) use of oral hypoglycemic drugs    Myelodysplastic syndrome, unspecified    Other drug-induced agranulocytosis    Other long term (current) drug therapy    Other specified disorders of nose and nasal sinuses    Pain in right knee    Pain in unspecified knee    Personal history of diseases of the blood and blood-forming organs and certain disorders involving the immune mechanism    Personal history of other endocrine, nutritional and metabolic disease    Personal history of other medical treatment    Personal history of other specified conditions    Pneumonia, unspecified organism    Shortness of breath    Synovial cyst of popliteal space [[place]], right knee    Thrombocytopenia, unspecified    Thrombocytosis, unspecified    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 disease    Unspecified abdominal pain    Unspecified fall, initial encounter    Unspecified place or not applicable    Chronic kidney disease, stage 3b    Disorder of kidney and ureter, unspecified    Irritable bowel syndrome with diarrhea    No pertinent family history in first degree relatives    No pertinent family history in first degree relatives    No pertinent family history in first degree relatives    Handoff    MEWS Score    DM (diabetes mellitus)    MDS (myelodysplastic syndrome)    Traumatic subdural hemorrhage with brief coma    Craniotomy, emergent, for subdural hematoma evacuation    Embolization of cerebral vessel    No significant past surgical history    H/O colonoscopy    FALL HIT HEAD    90+    [email or web address]        PLAN:  Continue current management  Care as per Neuro Critical Care team  F/U cultures

## 2024-10-17 NOTE — PROGRESS NOTE ADULT - ASSESSMENT
ASSESSMENT/PLAN:  79F w/ PMHx of MDS, DM, HTN, CKD presents after fall at home, found to have R SDH.   s/p 3 s/p crani and hematoma evacuation and MMA embolization     NEURO:  q4h neurochecks  repeat CTH today to r/o worsening collection/postop pseudomeningocele; NSGY aware- if worse, consider LD drain   repeat cth 10/10/24- stable - R Holo acute SDH - 4.5 R---> L shift   NSGY following  analgesia- tylenol and tramadol  Activity: [X] oobtc with assist if not agitated  PT/OT    PULM: s/p Resp failure secondary to neuro injury/pulmonary edema/?stridor  LFNC; obtain abg   s/p solumedrol IV   extubated to hfnc/bipap on 10/13  c/w lasix, duonebs q6  HOB>30  Aspiration precautions    CV: htn, pulmonary edema   MAP>65, sbp <150  c/w CCB  continue labetalol (instead of ACEi) which was started inpatient   Takes Lisinopril (held for hyperK 4.7- 5.0 )   lasix initiated for pulmonary edema- can dc once no longer indicated  echo    TTE Echo Complete w/o Contrast w/ Doppler (10.09.24 @ 12:41) >  Summary:   1. Technically limited study.   2. Normal global left ventricular systolic function.   3. Left ventricular ejection fraction, by visual estimation, is 65 to   70%.   4. The left ventricular diastolic function could not be assessed in this   study.   5. Normal right ventricular size and function.   6. Mildly enlarged left atrium.  7. Sclerotic aortic valve with normal opening.   8. Mild tricuspid regurgitation.   9. Normal pulmonary artery pressure.      RENAL: CKD-  AGMA (MARCO on cKD)/NAGMA ((hyperchloremia)  / Hyperphostemia/ Metabolic acidosis secondary to CKD /hyperK  - H2Co3 1300 mg q8  _ can deescalate to home dose 650 mg q 8 as pt acid-base status improves  c/w po diuresis   lokelma initiated (pt on [facility] outpatient)- qd-- can hold for hypokalemia  - Will monitor electrolytes   ocampo placed on 10/12- dc today    GI:  dysphagia/swallow- advance diet per swallow eval  GI prophylaxis [x] not indicated [] PPI   Bowel regimen [x] miralax [x] senna [] other:  LBM - 10/14    ENDO:  Prediabetic    ac/hs correction scale insulin   Goal -180  now off steroids  Hold glimepiride, jardiance, pioglitazone  a1c- 6.0   TSH- 2.68     HEME/ONC:  MDS   VTE prophylaxis: [x] SCDs [ [x] heparin 5 K q 8 SQ   venous dopplers b/l LEs- neg - 10/9/24   anemia due to MDS as well as worsening renal insufficiency- repeat today, if Hb <7 will transfuse  s/p 1 unit prbc overnight with good response, will repeat to ensure stability  no sequelae of bleeding  MDS hx (follows with Dr Wade)- - On revlimid and Vidaza as OPT previously NOW on Lusaracept.   LUE superficial thrombophlebitis on duplex from10/15--> elevate LUE and ice packs     ID:  monitor off abx  cxr on 10/12 with ?perihilar opacity on L mid lung zone   Cultures neg  PCT on 10/9 is 7.3--> repeat 1.04  UA negative  MRSA +- bactroban per protocol      SOCIAL/FAMILY:  [x] updated at bedside and over phone    PIV     CODE STATUS:  [x] Full Code     DISPOSITION:  [x] nsicu ASSESSMENT/PLAN:  79F w/ PMHx of MDS, DM, HTN, CKD presents after fall at home, found to have R SDH.   s/p 3 s/p crani and hematoma evacuation and MMA embolization     NEURO:  q4h neurochecks  repeat CTH - stable as aBOVE   mONITOR Worsening leak   Scalp wrapped per NSG   NSGY following  analgesia- tylenol and tramadol  Activity: [X] oobtc with assist if not agitated  PT/OT    PULM: s/p Resp failure secondary to neuro injury/pulmonary edema/?stridor  RA - sat > 92 %  c/w lasix, duonebs q6 prn wheezing   HOB>30  Aspiration precautions    CV: htn, S/P  pulmonary edema   MAP>65, sbp <150  c/w CCB  labetalol (instead of ACEi) which was started inpatient   Takes Lisinopril (held for hyperK 4.7- 5.0 ) - if stable K would resume and D/C labetolol   Repeat CXR - determine need for lasix   echo    TTE Echo Complete w/o Contrast w/ Doppler (10.09.24 @ 12:41) >  Summary:   1. Technically limited study.   2. Normal global left ventricular systolic function.   3. Left ventricular ejection fraction, by visual estimation, is 65 to   70%.   4. The left ventricular diastolic function could not be assessed in this   study.   5. Normal right ventricular size and function.   6. Mildly enlarged left atrium.  7. Sclerotic aortic valve with normal opening.   8. Mild tricuspid regurgitation.   9. Normal pulmonary artery pressure.      RENAL: CKD-  AGMA (MARCO on cKD)/NAGMA ((hyperchloremia)  / Hyperphostemia/ Metabolic acidosis secondary to CKD /hyperK  - H2Co3 1300 mg q8  _ can deescalate to home dose 650 mg q 8 as pt acid-base status improves  - evaluate q day for need of diuresis   - Lokelma initiated  prn (pt on SPS outpatient)- qd-- can hold for hypokalemia  - Will monitor electrolytes  - Bladder scanning q 6    - No ocampo     GI:  Carb consistent - low Na - easy to chew   GI prophylaxis [x] not indicated   Bowel regimen [x] miralax [x] senna [] other:  LBM - 10/17    ENDO:  Prediabetic    ac/hs correction scale insulin   Goal -180  now off steroids  Hold glimepiride, jardiance, pioglitazone  a1c- 6.0   TSH- 2.68     HEME/ONC:  MDS   VTE prophylaxis: [x] SCDs [ [x] heparin 5 K q 8 SQ   venous dopplers b/l LEs- neg - 10/9/24   anemia due to MDS as well as worsening renal insufficiency- repeat today, if Hb <7 will transfuse  s/p 1 unit prbc  10/16/24 - F/U HGB 8.3 ( 6.3 )   no sequelae of bleeding  MDS hx (follows with Dr Wade)- - On revlimid and Vidaza as OPT previously NOW on Lusaracept.   LUE superficial thrombophlebitis on duplex from10/15--> elevate LUE - warm pack and elevated L arm above heart      ID:  monitor off abx  F/U subgaleal fluid   cxr on 10/12 with ?perihilar opacity on L mid lung zone   Cultures neg  PCT on 10/9 is 7.3--> repeat 1.04  UA negative  MRSA +- bactroban per protocol- Day 3/7      SOCIAL/FAMILY:  [x] updated at bedside and over phone    PIV     CODE STATUS: Full   [x] Full Code     DISPOSITION:  [x] CEU

## 2024-10-18 LAB
ALBUMIN SERPL ELPH-MCNC: 3.4 G/DL — LOW (ref 3.5–5.2)
ALP SERPL-CCNC: 89 U/L — SIGNIFICANT CHANGE UP (ref 30–115)
ALT FLD-CCNC: 20 U/L — SIGNIFICANT CHANGE UP (ref 0–41)
ANION GAP SERPL CALC-SCNC: 9 MMOL/L — SIGNIFICANT CHANGE UP (ref 7–14)
AST SERPL-CCNC: 14 U/L — SIGNIFICANT CHANGE UP (ref 0–41)
BASOPHILS # BLD AUTO: 0.02 K/UL — SIGNIFICANT CHANGE UP (ref 0–0.2)
BASOPHILS NFR BLD AUTO: 0.2 % — SIGNIFICANT CHANGE UP (ref 0–1)
BILIRUB SERPL-MCNC: 0.9 MG/DL — SIGNIFICANT CHANGE UP (ref 0.2–1.2)
BUN SERPL-MCNC: 53 MG/DL — HIGH (ref 10–20)
CALCIUM SERPL-MCNC: 9.1 MG/DL — SIGNIFICANT CHANGE UP (ref 8.4–10.5)
CHLORIDE SERPL-SCNC: 112 MMOL/L — HIGH (ref 98–110)
CO2 SERPL-SCNC: 24 MMOL/L — SIGNIFICANT CHANGE UP (ref 17–32)
CREAT SERPL-MCNC: 1.3 MG/DL — SIGNIFICANT CHANGE UP (ref 0.7–1.5)
EGFR: 42 ML/MIN/1.73M2 — LOW
EOSINOPHIL # BLD AUTO: 0.32 K/UL — SIGNIFICANT CHANGE UP (ref 0–0.7)
EOSINOPHIL NFR BLD AUTO: 2.6 % — SIGNIFICANT CHANGE UP (ref 0–8)
GLUCOSE BLDC GLUCOMTR-MCNC: 107 MG/DL — HIGH (ref 70–99)
GLUCOSE BLDC GLUCOMTR-MCNC: 140 MG/DL — HIGH (ref 70–99)
GLUCOSE BLDC GLUCOMTR-MCNC: 142 MG/DL — HIGH (ref 70–99)
GLUCOSE BLDC GLUCOMTR-MCNC: 170 MG/DL — HIGH (ref 70–99)
GLUCOSE SERPL-MCNC: 48 MG/DL — CRITICAL LOW (ref 70–99)
HCT VFR BLD CALC: 25.3 % — LOW (ref 37–47)
HGB BLD-MCNC: 8.3 G/DL — LOW (ref 12–16)
IMM GRANULOCYTES NFR BLD AUTO: 4.7 % — HIGH (ref 0.1–0.3)
LYMPHOCYTES # BLD AUTO: 0.83 K/UL — LOW (ref 1.2–3.4)
LYMPHOCYTES # BLD AUTO: 6.8 % — LOW (ref 20.5–51.1)
MAGNESIUM SERPL-MCNC: 1.9 MG/DL — SIGNIFICANT CHANGE UP (ref 1.8–2.4)
MCHC RBC-ENTMCNC: 30.4 PG — SIGNIFICANT CHANGE UP (ref 27–31)
MCHC RBC-ENTMCNC: 32.8 G/DL — SIGNIFICANT CHANGE UP (ref 32–37)
MCV RBC AUTO: 92.7 FL — SIGNIFICANT CHANGE UP (ref 81–99)
MONOCYTES # BLD AUTO: 1.11 K/UL — HIGH (ref 0.1–0.6)
MONOCYTES NFR BLD AUTO: 9.1 % — SIGNIFICANT CHANGE UP (ref 1.7–9.3)
NEUTROPHILS # BLD AUTO: 9.3 K/UL — HIGH (ref 1.4–6.5)
NEUTROPHILS NFR BLD AUTO: 76.6 % — HIGH (ref 42.2–75.2)
NRBC # BLD: 0 /100 WBCS — SIGNIFICANT CHANGE UP (ref 0–0)
PHOSPHATE SERPL-MCNC: 3.2 MG/DL — SIGNIFICANT CHANGE UP (ref 2.1–4.9)
PLATELET # BLD AUTO: 148 K/UL — SIGNIFICANT CHANGE UP (ref 130–400)
PMV BLD: 11.4 FL — HIGH (ref 7.4–10.4)
POTASSIUM SERPL-MCNC: 4.4 MMOL/L — SIGNIFICANT CHANGE UP (ref 3.5–5)
POTASSIUM SERPL-SCNC: 4.4 MMOL/L — SIGNIFICANT CHANGE UP (ref 3.5–5)
PROT SERPL-MCNC: 4.8 G/DL — LOW (ref 6–8)
RBC # BLD: 2.73 M/UL — LOW (ref 4.2–5.4)
RBC # FLD: 16.1 % — HIGH (ref 11.5–14.5)
SODIUM SERPL-SCNC: 145 MMOL/L — SIGNIFICANT CHANGE UP (ref 135–146)
WBC # BLD: 12.15 K/UL — HIGH (ref 4.8–10.8)
WBC # FLD AUTO: 12.15 K/UL — HIGH (ref 4.8–10.8)

## 2024-10-18 PROCEDURE — 99233 SBSQ HOSP IP/OBS HIGH 50: CPT

## 2024-10-18 PROCEDURE — 70450 CT HEAD/BRAIN W/O DYE: CPT | Mod: 26

## 2024-10-18 RX ADMIN — Medication 200 MILLIGRAM(S): at 06:37

## 2024-10-18 RX ADMIN — HEPARIN SODIUM 5000 UNIT(S): 10000 INJECTION INTRAVENOUS; SUBCUTANEOUS at 06:38

## 2024-10-18 RX ADMIN — MUPIROCIN 1 APPLICATION(S): 20 OINTMENT TOPICAL at 07:05

## 2024-10-18 RX ADMIN — Medication 40 MILLIGRAM(S): at 07:05

## 2024-10-18 RX ADMIN — Medication 10 MILLIGRAM(S): at 06:37

## 2024-10-18 RX ADMIN — Medication 200 MILLIGRAM(S): at 14:13

## 2024-10-18 RX ADMIN — LEVETIRACETAM 500 MILLIGRAM(S): 500 TABLET, FILM COATED ORAL at 06:38

## 2024-10-18 RX ADMIN — Medication 50 MILLILITER(S): at 06:47

## 2024-10-18 RX ADMIN — CHLORHEXIDINE GLUCONATE 1 APPLICATION(S): 40 SOLUTION TOPICAL at 06:36

## 2024-10-18 RX ADMIN — LEVETIRACETAM 500 MILLIGRAM(S): 500 TABLET, FILM COATED ORAL at 18:15

## 2024-10-18 RX ADMIN — HEPARIN SODIUM 5000 UNIT(S): 10000 INJECTION INTRAVENOUS; SUBCUTANEOUS at 14:13

## 2024-10-18 RX ADMIN — MUPIROCIN 1 APPLICATION(S): 20 OINTMENT TOPICAL at 20:05

## 2024-10-18 RX ADMIN — HEPARIN SODIUM 5000 UNIT(S): 10000 INJECTION INTRAVENOUS; SUBCUTANEOUS at 21:15

## 2024-10-18 RX ADMIN — Medication 1300 MILLIGRAM(S): at 06:37

## 2024-10-18 RX ADMIN — Medication 200 MILLIGRAM(S): at 21:15

## 2024-10-18 NOTE — PROGRESS NOTE ADULT - SUBJECTIVE AND OBJECTIVE BOX
S/P Right craniotomy for evac of SDH 10.8.24  S/P MMA Embolization 10.9.24    Pt seen and examined at bedside. Pt without complaints.     Vital Signs Last 24 Hrs  T(C): 36.3 (18 Oct 2024 11:48), Max: 36.7 (18 Oct 2024 00:00)  T(F): 97.4 (18 Oct 2024 11:48), Max: 98 (18 Oct 2024 00:00)  HR: 80 (18 Oct 2024 14:00) (65 - 80)  BP: 126/60 (18 Oct 2024 14:00) (115/55 - 160/67)  BP(mean): 109 (18 Oct 2024 11:48) (79 - 109)  RR: 20 (18 Oct 2024 11:48) (11 - 30)  SpO2: 99% (18 Oct 2024 11:48) (96% - 99%)    Parameters below as of 18 Oct 2024 08:02  Patient On (Oxygen Delivery Method): room air    I&O's Detail    17 Oct 2024 07:01  -  18 Oct 2024 07:00  --------------------------------------------------------  IN:    Oral Fluid: 860 mL  Total IN: 860 mL    OUT:    Intermittent Catheterization - Urethral (mL): 1300 mL    Voided (mL): 800 mL  Total OUT: 2100 mL    Total NET: -1240 mL      18 Oct 2024 07:01  -  18 Oct 2024 16:55  --------------------------------------------------------  IN:  Total IN: 0 mL    OUT:    Voided (mL): 500 mL  Total OUT: 500 mL    Total NET: -500 mL        I&O's Summary    17 Oct 2024 07:01  -  18 Oct 2024 07:00  --------------------------------------------------------  IN: 860 mL / OUT: 2100 mL / NET: -1240 mL    18 Oct 2024 07:01  -  18 Oct 2024 16:55  --------------------------------------------------------  IN: 0 mL / OUT: 500 mL / NET: -500 mL        REVIEW OF SYSTEMS    [ ] A ten-point review of systems was otherwise negative except as noted.  [X] Due to altered mental status/intubation, subjective information were not able to be obtained from the patient. History was obtained, to the extent possible, from review of the chart and collateral sources of information.      PHYSICAL EXAM:  Neurological:  Sleepy but arousable  Pupils 1.5 mm  minimal tracking  SEVERINO to commands  Incision intact   Mild boggy area to right side      LABS:                        8.3    12.15 )-----------( 148      ( 18 Oct 2024 04:39 )             25.3     10-18    145  |  112[H]  |  53[H]  ----------------------------<  48[LL]  4.4   |  24  |  1.3    Ca    9.1      18 Oct 2024 04:39  Phos  3.2     10-18  Mg     1.9     10-18    TPro  4.8[L]  /  Alb  3.4[L]  /  TBili  0.9  /  DBili  x   /  AST  14  /  ALT  20  /  AlkPhos  89  10-18      Urinalysis Basic - ( 18 Oct 2024 04:39 )    Color: x / Appearance: x / SG: x / pH: x  Gluc: 48 mg/dL / Ketone: x  / Bili: x / Urobili: x   Blood: x / Protein: x / Nitrite: x   Leuk Esterase: x / RBC: x / WBC x   Sq Epi: x / Non Sq Epi: x / Bacteria: x          CAPILLARY BLOOD GLUCOSE      POCT Blood Glucose.: 140 mg/dL (18 Oct 2024 11:20)  POCT Blood Glucose.: 107 mg/dL (18 Oct 2024 08:18)  POCT Blood Glucose.: 241 mg/dL (17 Oct 2024 22:14)      CSF Analysis: [] N/A      Allergies    No Known Allergies    Intolerances      MEDICATIONS:  Antibiotics:    Neuro:  levETIRAcetam 500 milliGRAM(s) Oral two times a day  traMADol 25 milliGRAM(s) Oral every 6 hours PRN  traMADol 50 milliGRAM(s) Oral every 6 hours PRN      IVF:      CULTURES:  Culture Results:   No growth to date. (10-16 @ 15:19)  Culture Results:   No growth at 5 days (10-08 @ 11:14)      RADIOLOGY & ADDITIONAL TESTS:      ASSESSMENT:  79y Female s/p    Traumatic subdural hemorrhage without loss of consciousness, initial encounter  NOMD    NOMD    Abnormal finding of blood chemistry, unspecified    Acute kidney failure, unspecified    Anemia, unspecified    Chronic cough    Chronic kidney disease, unspecified    Cough, unspecified    Cutaneous abscess of back [any part, except buttock]    Cutaneous abscess of limb, unspecified    Deficiency of other specified B group vitamins    Diarrhea, unspecified    Encounter for antineoplastic chemotherapy    Essential (primary) hypertension    FALL (ON) (FROM) UNSPECIFIED STAIRS AND STEPS, INIT ENCNTR    Fever, unspecified    History of falling    Hypomagnesemia    Long term (current) use of oral hypoglycemic drugs    Myelodysplastic syndrome, unspecified    Other drug-induced agranulocytosis    Other long term (current) drug therapy    Other specified disorders of nose and nasal sinuses    Pain in right knee    Pain in unspecified knee    Personal history of diseases of the blood and blood-forming organs and certain disorders involving the immune mechanism    Personal history of other endocrine, nutritional and metabolic disease    Personal history of other medical treatment    Personal history of other specified conditions    Pneumonia, unspecified organism    Shortness of breath    Synovial cyst of popliteal space [Rios], right knee    Thrombocytopenia, unspecified    Thrombocytosis, unspecified    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 disease    Unspecified abdominal pain    Unspecified fall, initial encounter    Unspecified place or not applicable    Chronic kidney disease, stage 3b    Disorder of kidney and ureter, unspecified    Irritable bowel syndrome with diarrhea    No pertinent family history in first degree relatives    No pertinent family history in first degree relatives    No pertinent family history in first degree relatives    Handoff    MEWS Score    DM (diabetes mellitus)    MDS (myelodysplastic syndrome)    Traumatic subdural hemorrhage with brief coma    Craniotomy, emergent, for subdural hematoma evacuation    Embolization of cerebral vessel    No significant past surgical history    H/O colonoscopy    FALL HIT HEAD    90+    [email or web address]        PLAN:  Continue Current Management

## 2024-10-18 NOTE — PROGRESS NOTE ADULT - ASSESSMENT
79F w/ PMHx of MDS (follows w/ Dr Wade, requires periodic transfusions for anemia), DM, HTN, CKD BIBEMS after being found unresponsive on the floor. CTH in the ED with R SDH s/p crani 10/9 and MMA embolization. Patient was admitted to [facility]U. Was eventually extubated and had some stridor, for which she completed steroids. Hospital course complicated by leaking of fluid (?seroma) from crani site for which neurosurgery is following. Hospital course further complicate by urinary retention, patient later able to void, and blood loss anemia s/p 1u PRNC on 10/16, patient now downgraded to SDU     R SDH due to fall s/p Crani 10/8  S/p MMA embolization 10/9  - intubated in ED for airway protection  - now comfortable on room air  - serial CTHs stable, discussed with Dr Ortiz  - patient noted to have an extracalvarial fluid collection s/p aspiration by neurosurgery, cultures NGTD  - discussed with neurosurgery, contact them if pt observed leaking fluid from crani site   - c/w Q4H neurochecks   - diet per s/s  - PT/OT    HTN with hypertensive urgency on admission  - s/p nicadipine GTT  - currently BP acceptable, continue with Labetalol 200 q8H and amlodipine 10 mg daily   - patient on lisinopril at home, now being held due to hyperkalemia   - echo 10/9 - limited study, EF65-70%  - hold lasix for now and repeat chest xray in AM     MARCO on CKD III  - resolved, had transient urinary retention, patient able to void  - c/w bladder scans q6H  - decrease bicarb to 650 Q8H (home dose)    H/o MDS  - follows with Dr Wade  - monitor CBC, transfuse if Hb <7  - s/p 1 unit prbc  10/16/24    All discussed with patient, continue monitoring in SDU for today    Patient seen at bedside, total time spent to evaluate and treat the patient's acute illness and chronic medical conditions as well as time spent reviewing prior records, labs, radiology, documenting in electronic medical records,  discussing medical plan with   medical team was more than 52 minutes with >50% of time spent face to face with patient, discussing with patient/family as well as coordination of care

## 2024-10-18 NOTE — PROGRESS NOTE ADULT - SUBJECTIVE AND OBJECTIVE BOX
SIMONA KUHN 79y Female  MRN#: [id number]   Hospital Day: 10d    HPI:      SUBJECTIVE  Patient is a 79y old Female who presents with a chief complaint of Traumatic subdural hemorrhage without loss of consciousness, initial encounter     (09 Oct 2024 16:29)  Currently admitted to medicine with the primary diagnosis of Traumatic subdural hemorrhage with brief coma      INTERVAL HPI AND OVERNIGHT EVENTS:  Patient was examined and seen at bedside. This morning she is resting comfortably in bed and reports no issues or overnight events.    OBJECTIVE  PAST MEDICAL & SURGICAL HISTORY  DM (diabetes mellitus)    MDS (myelodysplastic syndrome)    H/O colonoscopy      ALLERGIES:  No Known Allergies    MEDICATIONS:  STANDING MEDICATIONS  amLODIPine   Tablet 10 milliGRAM(s) Oral daily  chlorhexidine 2% Cloths 1 Application(s) Topical <User Schedule>  dextrose 50% Injectable 25 milliLiter(s) IV Push every 15 minutes  dextrose 50% Injectable 50 milliLiter(s) IV Push every 15 minutes  dextrose 50% Injectable 25 milliLiter(s) IV Push every 15 minutes  heparin   Injectable 5000 Unit(s) SubCutaneous every 8 hours  insulin lispro (ADMELOG) corrective regimen sliding scale   SubCutaneous Before meals and at bedtime  labetalol 200 milliGRAM(s) Oral every 8 hours  levETIRAcetam 500 milliGRAM(s) Oral two times a day  mupirocin 2% Ointment 1 Application(s) Topical every 12 hours  polyethylene glycol 3350 17 Gram(s) Oral daily  senna 2 Tablet(s) Oral at bedtime    PRN MEDICATIONS  traMADol 50 milliGRAM(s) Oral every 6 hours PRN  traMADol 25 milliGRAM(s) Oral every 6 hours PRN      VITAL SIGNS: Last 24 Hours  T(C): 36.3 (18 Oct 2024 11:48), Max: 36.7 (18 Oct 2024 00:00)  T(F): 97.4 (18 Oct 2024 11:48), Max: 98 (18 Oct 2024 00:00)  HR: 80 (18 Oct 2024 14:00) (62 - 80)  BP: 126/60 (18 Oct 2024 14:00) (115/55 - 160/67)  BP(mean): 109 (18 Oct 2024 11:48) (79 - 109)  RR: 20 (18 Oct 2024 11:48) (11 - 30)  SpO2: 99% (18 Oct 2024 11:48) (96% - 99%)    LABS:                        8.3    12.15 )-----------( 148      ( 18 Oct 2024 04:39 )             25.3     10-18    145  |  112[H]  |  53[H]  ----------------------------<  48[LL]  4.4   |  24  |  1.3    Ca    9.1      18 Oct 2024 04:39  Phos  3.2     10-18  Mg     1.9     10-18    TPro  4.8[L]  /  Alb  3.4[L]  /  TBili  0.9  /  DBili  x   /  AST  14  /  ALT  20  /  AlkPhos  89  10-18      Urinalysis Basic - ( 18 Oct 2024 04:39 )    Color: x / Appearance: x / SG: x / pH: x  Gluc: 48 mg/dL / Ketone: x  / Bili: x / Urobili: x   Blood: x / Protein: x / Nitrite: x   Leuk Esterase: x / RBC: x / WBC x   Sq Epi: x / Non Sq Epi: x / Bacteria: x      ABG - ( 16 Oct 2024 17:44 )  pH, Arterial: 7.41  pH, Blood: x     /  pCO2: 31    /  pO2: 133   / HCO3: 20    / Base Excess: -4.4  /  SaO2: 99.1        Culture - CSF with Gram Stain (collected 16 Oct 2024 15:19)  Source: .CSF CSF  Gram Stain (16 Oct 2024 21:33):    Moderate polymorphonuclear leukocytes per low power field    No organisms seen  Preliminary Report (17 Oct 2024 15:31):    No growth to date.    PHYSICAL EXAM:  CONSTITUTIONAL: No acute distress  ENT: Supple, no masses, no thyromegaly, no bruits, no JVD; moist mucous membranes  PULMONARY: Clear to auscultation bilaterally; no wheezes, rales, or rhonchi  CARDIOVASCULAR: Regular rate and rhythm; no murmurs, rubs, or gallops  GASTROINTESTINAL: Soft, non-tender, non-distended; bowel sounds present  MUSCULOSKELETAL: 2+ peripheral pulses; no clubbing, no cyanosis, b/l edema  NEUROLOGY: non-focal    ASSESSMENT & PLAN    79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 Per pt's daughter, pt was LKW yesterday evening. Daughter states this morning pt was not answering the phone and did not open the door for her home health aide. Daughter came to home and found her on the floor, pt was conscious and told daughter "I fell". On initial arrival to ED pt only EO to pain, not following commands, WD extremities. Underwent CTH showing R hemispheric SDH 2.2cm w/ mass effect and 1.1cm MLS. On reassessment in ED pt opening eyes to voice, answering some questions and following simple commands. SBP after CT in 200s, started on Nicardipine gtt. Received 1g IV Keppra, 1850mL LR bolus, and was started on insulin gtt for BP in 400s. Pt assessed by NSGY, going to OR for evacuation. Admitted to NSICU for further management.    On 10/08 pt went for NSG evacuation of R SDH (cranio. On 10/09 she went for R MMA embolization. Since then pt is being monitor in NICU and stable. On 10/16, pt was found to have leak from the crani site, STAT CTH showed increase in SDH and extracalvarial collection, NSG removed 25cc of blood and was re-sutured. Pt stable today with stable CTH scan on 10/17 AM. She has chronic high potassium and on SPS at home and also ACE.ARB which was stopped here that controlled her potassium but lokelma ordered PRN. She received 1 U of PRBC on 10/16 due to Hb <7, she has chronic anemia and has MDS. No acute bleeding noted other than SDH. Pt was also found to be reteaining urine on 10/17 around 800ml, but voided by herself, will rec c/w bladder scans for now. DC A-line on 10/17. She was deemed stable to downgrade to SDU.    10/18/2024:  - f/u PT/OT  - NRSRGY: contact if csf leak  - CTH stable  - monitor BMP, off bicarp now  --------------    #R SDH due to fall s/p Crani 10/8  #S/p MMA embolization 10/9  - intubated in ED for airway protection  - now comfortable on room air  - CTH stable  - patient noted to have an extracalvarial fluid collection s/p aspiration by neurosurgery, cultures NGTD  - contact NRSGY them if pt observed leaking fluid from crani site   - c/w Q4H neurochecks   - diet per s/s  - PT/OT    #HTN with hypertensive urgency on admission  - s/p nicadipine GTT  - BP controlled today  - cw Labetalol 200 q8H and amlodipine 10 mg daily   - patient on lisinopril at home, now being held due to hyperkalemia   - echo 10/9 - limited study, EF65-70%  - hold lasix for now  - repeat CXR in am    #MDS  - follows with Dr. Wade  - s/p 1 unit prbc  10/16/24  - monitor CBC, transfuse if Hb <7    #MARCO on CKD III  - resolved, had transient urinary retention, patient able to void  - c/w bladder scans q6H  - home dose? bicarb to 650 Q8H?  - off bicarb for now, monitor BMP    Pt seen and discussed with attending physician Dr. Wallace       SIMONA KUHN 79y Female  MRN#: [id number]   Hospital Day: 10d    HPI:      SUBJECTIVE  Patient is a 79y old Female who presents with a chief complaint of Traumatic subdural hemorrhage without loss of consciousness, initial encounter     (09 Oct 2024 16:29)  Currently admitted to medicine with the primary diagnosis of Traumatic subdural hemorrhage with brief coma      INTERVAL HPI AND OVERNIGHT EVENTS:  Patient was examined and seen at bedside. This morning she is resting comfortably in bed and reports no issues or overnight events.    OBJECTIVE  PAST MEDICAL & SURGICAL HISTORY  DM (diabetes mellitus)    MDS (myelodysplastic syndrome)    H/O colonoscopy      ALLERGIES:  No Known Allergies    MEDICATIONS:  STANDING MEDICATIONS  amLODIPine   Tablet 10 milliGRAM(s) Oral daily  chlorhexidine 2% Cloths 1 Application(s) Topical <User Schedule>  dextrose 50% Injectable 25 milliLiter(s) IV Push every 15 minutes  dextrose 50% Injectable 50 milliLiter(s) IV Push every 15 minutes  dextrose 50% Injectable 25 milliLiter(s) IV Push every 15 minutes  heparin   Injectable 5000 Unit(s) SubCutaneous every 8 hours  insulin lispro (ADMELOG) corrective regimen sliding scale   SubCutaneous Before meals and at bedtime  labetalol 200 milliGRAM(s) Oral every 8 hours  levETIRAcetam 500 milliGRAM(s) Oral two times a day  mupirocin 2% Ointment 1 Application(s) Topical every 12 hours  polyethylene glycol 3350 17 Gram(s) Oral daily  senna 2 Tablet(s) Oral at bedtime    PRN MEDICATIONS  traMADol 50 milliGRAM(s) Oral every 6 hours PRN  traMADol 25 milliGRAM(s) Oral every 6 hours PRN      VITAL SIGNS: Last 24 Hours  T(C): 36.3 (18 Oct 2024 11:48), Max: 36.7 (18 Oct 2024 00:00)  T(F): 97.4 (18 Oct 2024 11:48), Max: 98 (18 Oct 2024 00:00)  HR: 80 (18 Oct 2024 14:00) (62 - 80)  BP: 126/60 (18 Oct 2024 14:00) (115/55 - 160/67)  BP(mean): 109 (18 Oct 2024 11:48) (79 - 109)  RR: 20 (18 Oct 2024 11:48) (11 - 30)  SpO2: 99% (18 Oct 2024 11:48) (96% - 99%)    LABS:                        8.3    12.15 )-----------( 148      ( 18 Oct 2024 04:39 )             25.3     10-18    145  |  112[H]  |  53[H]  ----------------------------<  48[LL]  4.4   |  24  |  1.3    Ca    9.1      18 Oct 2024 04:39  Phos  3.2     10-18  Mg     1.9     10-18    TPro  4.8[L]  /  Alb  3.4[L]  /  TBili  0.9  /  DBili  x   /  AST  14  /  ALT  20  /  AlkPhos  89  10-18      Urinalysis Basic - ( 18 Oct 2024 04:39 )    Color: x / Appearance: x / SG: x / pH: x  Gluc: 48 mg/dL / Ketone: x  / Bili: x / Urobili: x   Blood: x / Protein: x / Nitrite: x   Leuk Esterase: x / RBC: x / WBC x   Sq Epi: x / Non Sq Epi: x / Bacteria: x      ABG - ( 16 Oct 2024 17:44 )  pH, Arterial: 7.41  pH, Blood: x     /  pCO2: 31    /  pO2: 133   / HCO3: 20    / Base Excess: -4.4  /  SaO2: 99.1        Culture - CSF with Gram Stain (collected 16 Oct 2024 15:19)  Source: .CSF CSF  Gram Stain (16 Oct 2024 21:33):    Moderate polymorphonuclear leukocytes per low power field    No organisms seen  Preliminary Report (17 Oct 2024 15:31):    No growth to date.    PHYSICAL EXAM:  CONSTITUTIONAL: No acute distress  ENT: Supple, no masses, no thyromegaly, no bruits, no JVD; moist mucous membranes  PULMONARY: Clear to auscultation bilaterally; no wheezes, rales, or rhonchi  CARDIOVASCULAR: Regular rate and rhythm; no murmurs, rubs, or gallops  GASTROINTESTINAL: Soft, non-tender, non-distended; bowel sounds present  MUSCULOSKELETAL: 2+ peripheral pulses; no clubbing, no cyanosis, b/l edema  NEUROLOGY: non-focal    ASSESSMENT & PLAN    79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 Per pt's daughter, pt was LKW yesterday evening. Daughter states this morning pt was not answering the phone and did not open the door for her home health aide. Daughter came to home and found her on the floor, pt was conscious and told daughter "I fell". On initial arrival to ED pt only EO to pain, not following commands, WD extremities. Underwent CTH showing R hemispheric SDH 2.2cm w/ mass effect and 1.1cm MLS. On reassessment in ED pt opening eyes to voice, answering some questions and following simple commands. SBP after CT in 200s, started on Nicardipine gtt. Received 1g IV Keppra, 1850mL LR bolus, and was started on insulin gtt for BP in 400s. Pt assessed by NSGY, going to OR for evacuation. Admitted to NSICU for further management.    On 10/08 pt went for NSG evacuation of R SDH (cranio. On 10/09 she went for R MMA embolization. Since then pt is being monitor in NICU and stable. On 10/16, pt was found to have leak from the crani site, STAT CTH showed increase in SDH and extracalvarial collection, NSG removed 25cc of blood and was re-sutured. Pt stable today with stable CTH scan on 10/17 AM. She has chronic high potassium and on SPS at home and also ACE.ARB which was stopped here that controlled her potassium but lokelma ordered PRN. She received 1 U of PRBC on 10/16 due to Hb <7, she has chronic anemia and has MDS. No acute bleeding noted other than SDH. Pt was also found to be reteaining urine on 10/17 around 800ml, but voided by herself, will rec c/w bladder scans for now. DC A-line on 10/17. She was deemed stable to downgrade to SDU.    10/18/2024:  - f/u PT/OT  - NRSRGY: contact if csf leak  - CTH stable  - monitor BMP, off bicarp now  --------------    #R SDH due to fall s/p Crani 10/8  #S/p MMA embolization 10/9  - intubated in ED for airway protection  - now comfortable on room air  - CTH stable  - patient noted to have an extracalvarial fluid collection s/p aspiration by neurosurgery, cultures NGTD  - NRSRGY: cw current mgmt, contact if csf leak  - diet per s/s  - f/u PT/OT  - cw Q4H neurochecks     #HTN with hypertensive urgency on admission  - s/p nicadipine GTT  - BP controlled today  - cw Labetalol 200 q8H and amlodipine 10 mg daily   - patient on lisinopril at home, now being held due to hyperkalemia   - echo 10/9 - limited study, EF65-70%  - hold lasix for now  - repeat CXR in am    #MDS  - follows with Dr. Wade  - s/p 1 unit prbc  10/16/24  - monitor CBC, transfuse if Hb <7    #MARCO on CKD III  - resolved, had transient urinary retention, patient able to void  - c/w bladder scans q6H  - home dose? bicarb to 650 Q8H?  - off bicarb for now, monitor BMP    Pt seen and discussed with attending physician Dr. Wallace

## 2024-10-18 NOTE — PHARMACOTHERAPY INTERVENTION NOTE - COMMENTS
Lokelma 5g po q24h, K 4.5, d/w NCC team, recommended holding Lokelma, team changed to q24h prn K >4.8
Med Rec [id number]    - pioglitazone 30 mg daily   - glimperide 2 mg twice daily   - jardiance 10 mg daily   - tradjenta 5 mg daily   - lisinopril 10 mg daily   - nifedipine 90 XL daily   - sodium polystyrene sulfonate 15 ml twice a week 
recommended adding bowel regimen w/ Miralax & senna
levetiracetam 750mg IVPB q12h, d/w NCC, recommended changing to IVP

## 2024-10-18 NOTE — PROGRESS NOTE ADULT - SUBJECTIVE AND OBJECTIVE BOX
SIMONA KUHN  79y Female    CHIEF COMPLAINT:    Patient is a 79y old  Female who presents with a chief complaint of Traumatic subdural hemorrhage without loss of consciousness, initial encounter    INTERVAL HPI/OVERNIGHT EVENTS:    Patient seen and examined. No acute events overnight. Downgraded from NCCU, on room air     ROS: All other systems are negative.    Vital Signs:    T(F): 97.4 (10-18-24 @ 11:48), Max: 98 (10-18-24 @ 00:00)  HR: 70 (10-18-24 @ 11:48) (60 - 78)  BP: 130/96 (10-18-24 @ 11:48) (115/55 - 160/67)  RR: 20 (10-18-24 @ 11:48) (11 - 30)  SpO2: 99% (10-18-24 @ 11:48) (96% - 99%)    17 Oct 2024 07:01  -  18 Oct 2024 07:00  --------------------------------------------------------  IN: 860 mL / OUT: 2100 mL / NET: -1240 mL    POCT Blood Glucose.: 140 mg/dL (18 Oct 2024 11:20)  POCT Blood Glucose.: 107 mg/dL (18 Oct 2024 08:18)  POCT Blood Glucose.: 241 mg/dL (17 Oct 2024 22:14)  POCT Blood Glucose.: 245 mg/dL (17 Oct 2024 16:51)    PHYSICAL EXAM:    GENERAL:  NAD chronically ill appearing   SKIN: No rashes or lesions  HEENT: Atraumatic. Normocephalic. healing post surgical incisions noted   NECK: Supple, No JVD.  PULMONARY: CTA B/L. No wheezing. No rales  CVS: Normal S1, S2. Rate and Rhythm are regular.   ABDOMEN/GI: Soft, Nontender, Nondistended   MSK:  No clubbing or cyanosis   NEUROLOGIC:  follows simple commands   PSYCH: sleepy, arousable     Consultant(s) Notes Reviewed:  [x ] YES  [ ] NO  Care Discussed with Consultants/Other Providers [ x] YES  [ ] NO    LABS:                        8.3    12.15 )-----------( 148      ( 18 Oct 2024 04:39 )             25.3     145  |  112[H]  |  53[H]  ----------------------------<  48[LL]  4.4   |  24  |  1.3    Ca    9.1      18 Oct 2024 04:39  Phos  3.2     10-18  Mg     1.9     10-18    TPro  4.8[L]  /  Alb  3.4[L]  /  TBili  0.9  /  DBili  x   /  AST  14  /  ALT  20  /  AlkPhos  89  10-18    Culture - CSF with Gram Stain (collected 16 Oct 2024 15:19)  Source: .CSF CSF  Gram Stain (16 Oct 2024 21:33):    Moderate polymorphonuclear leukocytes per low power field    No organisms seen  Preliminary Report (17 Oct 2024 15:31):    No growth to date.  RADIOLOGY & ADDITIONAL TESTS:  Imaging or report Personally Reviewed:  [x] YES  [ ] NO  EKG reviewed: [x] YES  [ ] NO    Medications:  Standing  amLODIPine   Tablet 10 milliGRAM(s) Oral daily  chlorhexidine 2% Cloths 1 Application(s) Topical <User Schedule>  dextrose 50% Injectable 50 milliLiter(s) IV Push every 15 minutes  dextrose 50% Injectable 25 milliLiter(s) IV Push every 15 minutes  dextrose 50% Injectable 25 milliLiter(s) IV Push every 15 minutes  heparin   Injectable 5000 Unit(s) SubCutaneous every 8 hours  insulin lispro (ADMELOG) corrective regimen sliding scale   SubCutaneous Before meals and at bedtime  labetalol 200 milliGRAM(s) Oral every 8 hours  levETIRAcetam 500 milliGRAM(s) Oral two times a day  mupirocin 2% Ointment 1 Application(s) Topical every 12 hours  polyethylene glycol 3350 17 Gram(s) Oral daily  senna 2 Tablet(s) Oral at bedtime    PRN Meds  traMADol 50 milliGRAM(s) Oral every 6 hours PRN  traMADol 25 milliGRAM(s) Oral every 6 hours PRN

## 2024-10-19 LAB
ALBUMIN SERPL ELPH-MCNC: 3.4 G/DL — LOW (ref 3.5–5.2)
ALP SERPL-CCNC: 92 U/L — SIGNIFICANT CHANGE UP (ref 30–115)
ALT FLD-CCNC: 20 U/L — SIGNIFICANT CHANGE UP (ref 0–41)
ANION GAP SERPL CALC-SCNC: 7 MMOL/L — SIGNIFICANT CHANGE UP (ref 7–14)
AST SERPL-CCNC: 17 U/L — SIGNIFICANT CHANGE UP (ref 0–41)
BASOPHILS # BLD AUTO: 0 K/UL — SIGNIFICANT CHANGE UP (ref 0–0.2)
BASOPHILS NFR BLD AUTO: 0 % — SIGNIFICANT CHANGE UP (ref 0–1)
BILIRUB SERPL-MCNC: 0.8 MG/DL — SIGNIFICANT CHANGE UP (ref 0.2–1.2)
BUN SERPL-MCNC: 41 MG/DL — HIGH (ref 10–20)
CALCIUM SERPL-MCNC: 8.9 MG/DL — SIGNIFICANT CHANGE UP (ref 8.4–10.5)
CHLORIDE SERPL-SCNC: 111 MMOL/L — HIGH (ref 98–110)
CO2 SERPL-SCNC: 26 MMOL/L — SIGNIFICANT CHANGE UP (ref 17–32)
CREAT SERPL-MCNC: 1.2 MG/DL — SIGNIFICANT CHANGE UP (ref 0.7–1.5)
DACRYOCYTES BLD QL SMEAR: SLIGHT — SIGNIFICANT CHANGE UP
EGFR: 46 ML/MIN/1.73M2 — LOW
ELLIPTOCYTES BLD QL SMEAR: SLIGHT — SIGNIFICANT CHANGE UP
EOSINOPHIL # BLD AUTO: 0.09 K/UL — SIGNIFICANT CHANGE UP (ref 0–0.7)
EOSINOPHIL NFR BLD AUTO: 0.9 % — SIGNIFICANT CHANGE UP (ref 0–8)
GLUCOSE BLDC GLUCOMTR-MCNC: 125 MG/DL — HIGH (ref 70–99)
GLUCOSE BLDC GLUCOMTR-MCNC: 129 MG/DL — HIGH (ref 70–99)
GLUCOSE BLDC GLUCOMTR-MCNC: 149 MG/DL — HIGH (ref 70–99)
GLUCOSE BLDC GLUCOMTR-MCNC: 249 MG/DL — HIGH (ref 70–99)
GLUCOSE SERPL-MCNC: 133 MG/DL — HIGH (ref 70–99)
HCT VFR BLD CALC: 24.4 % — LOW (ref 37–47)
HGB BLD-MCNC: 7.9 G/DL — LOW (ref 12–16)
LYMPHOCYTES # BLD AUTO: 0.42 K/UL — LOW (ref 1.2–3.4)
LYMPHOCYTES # BLD AUTO: 4.4 % — LOW (ref 20.5–51.1)
MAGNESIUM SERPL-MCNC: 1.7 MG/DL — LOW (ref 1.8–2.4)
MANUAL SMEAR VERIFICATION: SIGNIFICANT CHANGE UP
MCHC RBC-ENTMCNC: 30.4 PG — SIGNIFICANT CHANGE UP (ref 27–31)
MCHC RBC-ENTMCNC: 32.4 G/DL — SIGNIFICANT CHANGE UP (ref 32–37)
MCV RBC AUTO: 93.8 FL — SIGNIFICANT CHANGE UP (ref 81–99)
METAMYELOCYTES # FLD: 0.9 % — HIGH (ref 0–0)
MONOCYTES # BLD AUTO: 0.34 K/UL — SIGNIFICANT CHANGE UP (ref 0.1–0.6)
MONOCYTES NFR BLD AUTO: 3.5 % — SIGNIFICANT CHANGE UP (ref 1.7–9.3)
MYELOCYTES NFR BLD: 1.7 % — HIGH (ref 0–0)
NEUTROPHILS # BLD AUTO: 8.43 K/UL — HIGH (ref 1.4–6.5)
NEUTROPHILS NFR BLD AUTO: 86 % — HIGH (ref 42.2–75.2)
NEUTS BAND # BLD: 1.7 % — SIGNIFICANT CHANGE UP (ref 0–6)
PLAT MORPH BLD: NORMAL — SIGNIFICANT CHANGE UP
PLATELET # BLD AUTO: 166 K/UL — SIGNIFICANT CHANGE UP (ref 130–400)
PMV BLD: 12 FL — HIGH (ref 7.4–10.4)
POIKILOCYTOSIS BLD QL AUTO: SLIGHT — SIGNIFICANT CHANGE UP
POLYCHROMASIA BLD QL SMEAR: SLIGHT — SIGNIFICANT CHANGE UP
POTASSIUM SERPL-MCNC: 4 MMOL/L — SIGNIFICANT CHANGE UP (ref 3.5–5)
POTASSIUM SERPL-SCNC: 4 MMOL/L — SIGNIFICANT CHANGE UP (ref 3.5–5)
PROT SERPL-MCNC: 4.6 G/DL — LOW (ref 6–8)
RBC # BLD: 2.6 M/UL — LOW (ref 4.2–5.4)
RBC # FLD: 15.9 % — HIGH (ref 11.5–14.5)
RBC BLD AUTO: ABNORMAL
SODIUM SERPL-SCNC: 144 MMOL/L — SIGNIFICANT CHANGE UP (ref 135–146)
TARGETS BLD QL SMEAR: SLIGHT — SIGNIFICANT CHANGE UP
VARIANT LYMPHS # BLD: 0.9 % — SIGNIFICANT CHANGE UP (ref 0–5)
WBC # BLD: 9.61 K/UL — SIGNIFICANT CHANGE UP (ref 4.8–10.8)
WBC # FLD AUTO: 9.61 K/UL — SIGNIFICANT CHANGE UP (ref 4.8–10.8)

## 2024-10-19 PROCEDURE — 99232 SBSQ HOSP IP/OBS MODERATE 35: CPT

## 2024-10-19 PROCEDURE — 71045 X-RAY EXAM CHEST 1 VIEW: CPT | Mod: 26

## 2024-10-19 RX ORDER — MAGNESIUM SULFATE IN 0.9% NACL 2 G/50 ML
2 INTRAVENOUS SOLUTION, PIGGYBACK (ML) INTRAVENOUS
Refills: 0 | Status: COMPLETED | OUTPATIENT
Start: 2024-10-19 | End: 2024-10-19

## 2024-10-19 RX ADMIN — LEVETIRACETAM 500 MILLIGRAM(S): 500 TABLET, FILM COATED ORAL at 17:23

## 2024-10-19 RX ADMIN — MUPIROCIN 1 APPLICATION(S): 20 OINTMENT TOPICAL at 17:55

## 2024-10-19 RX ADMIN — Medication 200 MILLIGRAM(S): at 15:26

## 2024-10-19 RX ADMIN — Medication 25 GRAM(S): at 08:30

## 2024-10-19 RX ADMIN — HEPARIN SODIUM 5000 UNIT(S): 10000 INJECTION INTRAVENOUS; SUBCUTANEOUS at 22:03

## 2024-10-19 RX ADMIN — HEPARIN SODIUM 5000 UNIT(S): 10000 INJECTION INTRAVENOUS; SUBCUTANEOUS at 15:27

## 2024-10-19 RX ADMIN — Medication 10 MILLIGRAM(S): at 05:22

## 2024-10-19 RX ADMIN — Medication 25 GRAM(S): at 12:02

## 2024-10-19 RX ADMIN — Medication 0: at 22:03

## 2024-10-19 RX ADMIN — Medication 200 MILLIGRAM(S): at 22:03

## 2024-10-19 RX ADMIN — Medication 200 MILLIGRAM(S): at 05:22

## 2024-10-19 RX ADMIN — MUPIROCIN 1 APPLICATION(S): 20 OINTMENT TOPICAL at 06:00

## 2024-10-19 RX ADMIN — Medication 2 TABLET(S): at 22:03

## 2024-10-19 RX ADMIN — HEPARIN SODIUM 5000 UNIT(S): 10000 INJECTION INTRAVENOUS; SUBCUTANEOUS at 05:22

## 2024-10-19 RX ADMIN — CHLORHEXIDINE GLUCONATE 1 APPLICATION(S): 40 SOLUTION TOPICAL at 05:23

## 2024-10-19 RX ADMIN — Medication 4: at 12:04

## 2024-10-19 RX ADMIN — LEVETIRACETAM 500 MILLIGRAM(S): 500 TABLET, FILM COATED ORAL at 05:22

## 2024-10-19 NOTE — PROGRESS NOTE ADULT - SUBJECTIVE AND OBJECTIVE BOX
SIMONA KUHN  79y Female    CHIEF COMPLAINT:    Patient is a 79y old  Female who presents with a chief complaint of Traumatic subdural hemorrhage without loss of consciousness, initial encounter    INTERVAL HPI/OVERNIGHT EVENTS:    Patient seen and examined. No acute events overnight. No active complaints, tolerating breakfast     ROS: All other systems are negative.    Vital Signs:    T(F): 98.4 (10-19-24 @ 08:00), Max: 98.4 (10-19-24 @ 08:00)  HR: 68 (10-19-24 @ 08:00) (68 - 85)  BP: 117/56 (10-19-24 @ 08:00) (117/56 - 172/68)  RR: 18 (10-19-24 @ 08:00) (18 - 20)  SpO2: 95% (10-19-24 @ 08:00) (95% - 99%)    18 Oct 2024 07:01  -  19 Oct 2024 07:00  --------------------------------------------------------  IN: 0 mL / OUT: 1000 mL / NET: -1000 mL    POCT Blood Glucose.: 149 mg/dL (19 Oct 2024 08:11)  POCT Blood Glucose.: 170 mg/dL (18 Oct 2024 21:33)  POCT Blood Glucose.: 142 mg/dL (18 Oct 2024 17:15)  POCT Blood Glucose.: 140 mg/dL (18 Oct 2024 11:20)    PHYSICAL EXAM:    GENERAL:  NAD ill appearing   SKIN: No rashes or lesions  HEENT: Atraumatic. Normocephalic. healing post crani incision  NECK: Supple, No JVD.    PULMONARY: CTA B/L. No wheezing. No rales  CVS: Normal S1, S2. Rate and Rhythm are regular.   ABDOMEN/GI: Soft, Nontender, Nondistended   MSK:  No clubbing or cyanosis   NEUROLOGIC:  moves all extremities, follows commands   PSYCH: Awake and alert     Consultant(s) Notes Reviewed:  [x ] YES  [ ] NO  Care Discussed with Consultants/Other Providers [ x] YES  [ ] NO    LABS:                        7.9    9.61  )-----------( 166      ( 19 Oct 2024 05:44 )             24.4     144  |  111[H]  |  41[H]  ----------------------------<  133[H]  4.0   |  26  |  1.2    Ca    8.9      19 Oct 2024 05:44  Phos  3.2     10-18  Mg     1.7     10-19    TPro  4.6[L]  /  Alb  3.4[L]  /  TBili  0.8  /  DBili  x   /  AST  17  /  ALT  20  /  AlkPhos  92  10-19    Culture - CSF with Gram Stain (collected 16 Oct 2024 15:19)  Source: .CSF CSF  Gram Stain (16 Oct 2024 21:33):    Moderate polymorphonuclear leukocytes per low power field    No organisms seen  Preliminary Report (17 Oct 2024 15:31):    No growth to date.    RADIOLOGY & ADDITIONAL TESTS:  Imaging or report Personally Reviewed:  [x] YES  [ ] NO  EKG reviewed: [x] YES  [ ] NO    Medications:  Standing  amLODIPine   Tablet 10 milliGRAM(s) Oral daily  chlorhexidine 2% Cloths 1 Application(s) Topical <User Schedule>  dextrose 50% Injectable 25 milliLiter(s) IV Push every 15 minutes  dextrose 50% Injectable 50 milliLiter(s) IV Push every 15 minutes  dextrose 50% Injectable 25 milliLiter(s) IV Push every 15 minutes  heparin   Injectable 5000 Unit(s) SubCutaneous every 8 hours  insulin lispro (ADMELOG) corrective regimen sliding scale   SubCutaneous Before meals and at bedtime  labetalol 200 milliGRAM(s) Oral every 8 hours  levETIRAcetam 500 milliGRAM(s) Oral two times a day  magnesium sulfate  IVPB 2 Gram(s) IV Intermittent every 2 hours  mupirocin 2% Ointment 1 Application(s) Topical every 12 hours  polyethylene glycol 3350 17 Gram(s) Oral daily  senna 2 Tablet(s) Oral at bedtime    PRN Meds  traMADol 50 milliGRAM(s) Oral every 6 hours PRN  traMADol 25 milliGRAM(s) Oral every 6 hours PRN

## 2024-10-19 NOTE — PROGRESS NOTE ADULT - ASSESSMENT
79F w/ PMHx of MDS (follows w/ Dr Wade, requires periodic transfusions for anemia), DM, HTN, CKD BIBEMS after being found unresponsive on the floor. CTH in the ED with R SDH s/p crani 10/9 and MMA embolization. Patient was admitted to NCCU. Was eventually extubated and had some stridor, for which she completed steroids. Hospital course complicated by leaking of fluid (?seroma) from crani site for which neurosurgery is following. Hospital course further complicate by urinary retention, patient later able to void, and blood loss anemia s/p 1u PRNC on 10/16, patient now downgraded to SDU     R SDH due to fall s/p Crani 10/8  S/p MMA embolization 10/9  - intubated in ED for airway protection  - now comfortable on room air  - serial CTHs stable, discussed with Dr Ortiz  - patient noted to have an extracalvarial fluid collection s/p aspiration by neurosurgery, cultures NGTD  - discussed with neurosurgery, contact them if pt observed leaking fluid from crani site   - c/w neurochecks   - diet per s/s  - PT/OT    HTN with hypertensive urgency on admission  - s/p nicadipine GTT  - currently BP acceptable, continue with Labetalol 200 q8H and amlodipine 10 mg daily   - patient on lisinopril at home, now being held due to hyperkalemia   - echo 10/9 - limited study, EF65-70%  - hold lasix for now and repeat chest xray in AM     MARCO on CKD III  - resolved, had transient urinary retention, patient able to void  - c/w bladder scans q6H  - holding bicarb 650 Q8H (home dose)    H/o MDS  - follows with Dr Wade  - monitor CBC, transfuse if Hb <7  - s/p 1 unit prbc  10/16/24    All discussed with patient, can be downgraded to med surg  Pending: monitor crani site, neurosurgery fu, PT/OT    Patient seen at bedside, total time spent to evaluate and treat the patient's acute illness and chronic medical conditions as well as time spent reviewing prior records, labs, radiology, documenting in electronic medical records,  discussing medical plan with   medical team was more than 40 minutes with >50% of time spent face to face with patient, discussing with patient/family as well as coordination of care

## 2024-10-19 NOTE — CHART NOTE - NSCHARTNOTEFT_GEN_A_CORE
Transfer Note  ---------------------------    Transfer from: SDU  Transfer to: [facility]    HOSPITAL COURSE    78 y/o 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 Per pt's daughter, pt was LKW yesterday evening. Daughter states this morning pt was not answering the phone and did not open the door for her home health aide. Daughter came to home and found her on the floor, pt was conscious and told daughter "I fell". On initial arrival to ED pt only EO to pain, not following commands, WD extremities. Underwent CTH showing R hemispheric SDH 2.2cm w/ mass effect and 1.1cm MLS. On reassessment in ED pt opening eyes to voice, answering some questions and following simple commands. SBP after CT in 200s, started on Nicardipine gtt. Received 1g IV Keppra, 1850mL LR bolus, and was started on insulin gtt for BP in 400s. Pt assessed by NSGY, going to OR for evacuation. Admitted to NSICU for further management.    On 10/08 pt went for NSG evacuation of R SDH (cranio. On 10/09 she went for R MMA embolization. Since then pt is being monitor in NICU and stable. On 10/16, pt was found to have leak from the crani site, STAT CTH showed increase in SDH and extra-calvarial collection, NSG removed 25cc of blood and was re-sutured. Pt stable today with stable CTH scan on 10/17 AM. She has chronic high potassium and on SPS at home and also ACE.ARB which was stopped here that controlled her potassium but Lokelma ordered PRN. She received 1 U of pRBC on 10/16 due to Hb <7, she has chronic anemia and has MDS. No acute bleeding noted other than SDH. Pt was also found to be retaining urine on 10/17 around 800ml, but voided by herself, will rec c/w bladder scans for now. DC A-line on 10/17. She is currently stable to downgrade to SDU.         To-Do:    [ ]   [ ]     OBJECTIVE --  Vital Signs Last 24 Hrs  T(C): 36.9 (19 Oct 2024 08:00), Max: 36.9 (19 Oct 2024 08:00)  T(F): 98.4 (19 Oct 2024 08:00), Max: 98.4 (19 Oct 2024 08:00)  HR: 68 (19 Oct 2024 08:00) (68 - 85)  BP: 117/56 (19 Oct 2024 08:00) (117/56 - 172/68)  BP(mean): 80 (19 Oct 2024 08:00) (80 - 109)  RR: 18 (19 Oct 2024 08:00) (18 - 20)  SpO2: 95% (19 Oct 2024 08:00) (95% - 99%)        I&O's Summary    18 Oct 2024 07:01  -  19 Oct 2024 07:00  --------------------------------------------------------  IN: 0 mL / OUT: 1000 mL / NET: -1000 mL    19 Oct 2024 07:01  -  19 Oct 2024 10:41  --------------------------------------------------------  IN: 50 mL / OUT: 0 mL / NET: 50 mL        MEDICATIONS  (STANDING):  amLODIPine   Tablet 10 milliGRAM(s) Oral daily  chlorhexidine 2% Cloths 1 Application(s) Topical <User Schedule>  dextrose 50% Injectable 50 milliLiter(s) IV Push every 15 minutes  dextrose 50% Injectable 25 milliLiter(s) IV Push every 15 minutes  dextrose 50% Injectable 25 milliLiter(s) IV Push every 15 minutes  heparin   Injectable 5000 Unit(s) SubCutaneous every 8 hours  insulin lispro (ADMELOG) corrective regimen sliding scale   SubCutaneous Before meals and at bedtime  labetalol 200 milliGRAM(s) Oral every 8 hours  levETIRAcetam 500 milliGRAM(s) Oral two times a day  magnesium sulfate  IVPB 2 Gram(s) IV Intermittent every 2 hours  mupirocin 2% Ointment 1 Application(s) Topical every 12 hours  polyethylene glycol 3350 17 Gram(s) Oral daily  senna 2 Tablet(s) Oral at bedtime    MEDICATIONS  (PRN):  traMADol 25 milliGRAM(s) Oral every 6 hours PRN Moderate Pain (4 - 6)  traMADol 50 milliGRAM(s) Oral every 6 hours PRN Severe Pain (7 - 10)        LABS                                            7.9                   Neurophils% (auto):   86.0   (10-19 @ 05:44):    9.61 )-----------(166          Lymphocytes% (auto):  4.4                                           24.4                   Eosinphils% (auto):   0.9      Manual%: Neutrophils x    ; Lymphocytes x    ; Eosinophils x    ; Bands%: 1.7  ; Blasts x                                    144    |  111    |  41                  Calcium: 8.9   / iCa: x      (10-19 @ 05:44)    ----------------------------<  133       Magnesium: 1.7                              4.0     |  26     |  1.2              Phosphorous: x        TPro  4.6    /  Alb  3.4    /  TBili  0.8    /  DBili  x      /  AST  17     /  ALT  20     /  AlkPhos  92     19 Oct 2024 05:44            ASSESSMENT & PLAN:         For Follow-Up: Transfer Note  ---------------------------    Transfer from: SDU  Transfer to: [facility]    HOSPITAL COURSE    80 y/o 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 Per pt's daughter, pt was LKW yesterday evening. Daughter states this morning pt was not answering the phone and did not open the door for her home health aide. Daughter came to home and found her on the floor, pt was conscious and told daughter "I fell". On initial arrival to ED pt only EO to pain, not following commands, WD extremities. Underwent CTH showing R hemispheric SDH 2.2cm w/ mass effect and 1.1cm MLS. On reassessment in ED pt opening eyes to voice, answering some questions and following simple commands. SBP after CT in 200s, started on Nicardipine gtt. Received 1g IV Keppra, 1850mL LR bolus, and was started on insulin gtt for BP in 400s. Pt assessed by NSGY, going to OR for evacuation. Admitted to NSICU for further management.    On 10/08 pt went for NSG evacuation of R SDH (cranio. On 10/09 she went for R MMA embolization. Since then pt is being monitor in NICU and stable. On 10/16, pt was found to have leak from the crani site, STAT CTH showed increase in SDH and extra-calvarial collection, NSG removed 25cc of blood and was re-sutured. Pt stable today with stable CTH scan on 10/17 AM. She has chronic high potassium and on SPS at home and also ACE.ARB which was stopped here that controlled her potassium but Lokelma ordered PRN. She received 1 U of pRBC on 10/16 due to Hb <7, she has chronic anemia and has MDS. No acute bleeding noted other than SDH. Pt was also found to be retaining urine on 10/17 around 800ml, but voided by herself, will rec c/w bladder scans for now. DC A-line on 10/17. She is currently stable to downgrade to SDU on 10/17    While on SDU, patient has remained AAOx3 although slow to respond and lethargic and has remained HD stable. No leakage noted from craniotomy site. Discharged to  for neuro checks q4H as per NSGYx      OBJECTIVE --  Vital Signs Last 24 Hrs  T(C): 36.9 (19 Oct 2024 08:00), Max: 36.9 (19 Oct 2024 08:00)  T(F): 98.4 (19 Oct 2024 08:00), Max: 98.4 (19 Oct 2024 08:00)  HR: 68 (19 Oct 2024 08:00) (68 - 85)  BP: 117/56 (19 Oct 2024 08:00) (117/56 - 172/68)  BP(mean): 80 (19 Oct 2024 08:00) (80 - 109)  RR: 18 (19 Oct 2024 08:00) (18 - 20)  SpO2: 95% (19 Oct 2024 08:00) (95% - 99%)        I&O's Summary    18 Oct 2024 07:01  -  19 Oct 2024 07:00  --------------------------------------------------------  IN: 0 mL / OUT: 1000 mL / NET: -1000 mL    19 Oct 2024 07:01  -  19 Oct 2024 10:41  --------------------------------------------------------  IN: 50 mL / OUT: 0 mL / NET: 50 mL        MEDICATIONS  (STANDING):  amLODIPine   Tablet 10 milliGRAM(s) Oral daily  chlorhexidine 2% Cloths 1 Application(s) Topical <User Schedule>  dextrose 50% Injectable 50 milliLiter(s) IV Push every 15 minutes  dextrose 50% Injectable 25 milliLiter(s) IV Push every 15 minutes  dextrose 50% Injectable 25 milliLiter(s) IV Push every 15 minutes  heparin   Injectable 5000 Unit(s) SubCutaneous every 8 hours  insulin lispro (ADMELOG) corrective regimen sliding scale   SubCutaneous Before meals and at bedtime  labetalol 200 milliGRAM(s) Oral every 8 hours  levETIRAcetam 500 milliGRAM(s) Oral two times a day  magnesium sulfate  IVPB 2 Gram(s) IV Intermittent every 2 hours  mupirocin 2% Ointment 1 Application(s) Topical every 12 hours  polyethylene glycol 3350 17 Gram(s) Oral daily  senna 2 Tablet(s) Oral at bedtime    MEDICATIONS  (PRN):  traMADol 25 milliGRAM(s) Oral every 6 hours PRN Moderate Pain (4 - 6)  traMADol 50 milliGRAM(s) Oral every 6 hours PRN Severe Pain (7 - 10)        LABS                                            7.9                   Neurophils% (auto):   86.0   (10-19 @ 05:44):    9.61 )-----------(166          Lymphocytes% (auto):  4.4                                           24.4                   Eosinphils% (auto):   0.9      Manual%: Neutrophils x    ; Lymphocytes x    ; Eosinophils x    ; Bands%: 1.7  ; Blasts x                                    144    |  111    |  41                  Calcium: 8.9   / iCa: x      (10-19 @ 05:44)    ----------------------------<  133       Magnesium: 1.7                              4.0     |  26     |  1.2              Phosphorous: x        TPro  4.6    /  Alb  3.4    /  TBili  0.8    /  DBili  x      /  AST  17     /  ALT  20     /  AlkPhos  92     19 Oct 2024 05:44            ASSESSMENT & PLAN:     80 y/o F w/ PMHx of MDS (follows w/ Dr Wade, requires periodic transfusions for anemia), DM, HTN, CKD BIBEMS after being found unresponsive on the floor. CTH in the ED with R SDH s/p crani 10/9 and MMA embolization. Patient was admitted to NCCU. Was eventually extubated and had some stridor, for which she completed steroids. Hospital course complicated by leaking of fluid (?seroma) from crani site for which neurosurgery is following. Hospital course further complicate by urinary retention, patient later able to void, and blood loss anemia s/p 1u pRBC on 10/16, patient now downgraded to SDU. Remained HDS in CEU, no leakage noted at craniotomy site. DG to 4C on 10/19    #R SDH 2/2 Fall s/p crani & hematoma evacuation 10/8  #S/p MMA embolization 10/9  - Intubated in ED for airway protection >  now comfortable on room air  - Last CTH (10/18): Stable examination in comparison to 10/17/2024; Stable residual right hemispheric subdural hemorrhage status post craniotomy for evacuation as well as low-density left hemispheric subdural collection without midline shift.  - Noted to have an extra-calvarial fluid collection s/p aspiration by neurosurgery, cultures NGTD  - NSGYx on board  - S/p S&S: easy to chew w/ thin liquids  - S/p PT/OT: PT recommended rehab  - Currently AAOx3 although slow and lethargic  Plan:  - C/w neuro checks q4H as per NSGYx (contacted prior to downgrade and want to continue q4H, state this can be done on 4C)  - Maintain SBP < 150    #HTN  #Hypertensive urgency on admission, resolved  - S/p nicardipine gtt  - On lisinopril at home, now being held due to hyperkalemia   - ECHO (10/9) - limited study, EF65-70%  - C/w Labetalol 200 q8H   - C/w Amlodipine 10 mg daily     #MARCO on CKD III, resolved  #Metabolic acidosis, resolved  - Had transient urinary retention, patient able to void  - C/w bladder scans q6H  - Holding bicarb 650 Q8H (home dose) as CO2 >21    #H/o MDS  - Follows with Dr. Wade OP  - S/p 1 unit pRBC on 10/16/24  - Monitor CBC, transfuse if Hb <7

## 2024-10-19 NOTE — PROGRESS NOTE ADULT - SUBJECTIVE AND OBJECTIVE BOX
S/P Right craniotomy for evac of SDH 10.8.24  S/P MMA Embolization 10.9.24    Pt seen and examined at bedside. Pt denies HA, dizziness or visual changes.     Vital Signs Last 24 Hrs  T(C): 37.1 (19 Oct 2024 11:00), Max: 37.1 (19 Oct 2024 11:00)  T(F): 98.7 (19 Oct 2024 11:00), Max: 98.7 (19 Oct 2024 11:00)  HR: 74 (19 Oct 2024 11:00) (68 - 85)  BP: 140/62 (19 Oct 2024 11:00) (117/56 - 172/68)  BP(mean): 89 (19 Oct 2024 11:00) (80 - 89)  RR: 18 (19 Oct 2024 11:00) (18 - 18)  SpO2: 97% (19 Oct 2024 11:00) (95% - 97%)      I&O's Detail    18 Oct 2024 07:01  -  19 Oct 2024 07:00  --------------------------------------------------------  IN:  Total IN: 0 mL    OUT:    Intermittent Catheterization - Urethral (mL): 500 mL    Voided (mL): 500 mL  Total OUT: 1000 mL    Total NET: -1000 mL      19 Oct 2024 07:01  -  19 Oct 2024 12:31  --------------------------------------------------------  IN:    IV PiggyBack: 50 mL  Total IN: 50 mL    OUT:  Total OUT: 0 mL    Total NET: 50 mL        I&O's Summary    18 Oct 2024 07:01  -  19 Oct 2024 07:00  --------------------------------------------------------  IN: 0 mL / OUT: 1000 mL / NET: -1000 mL    19 Oct 2024 07:01  -  19 Oct 2024 12:31  --------------------------------------------------------  IN: 50 mL / OUT: 0 mL / NET: 50 mL        REVIEW OF SYSTEMS    [X ] A ten-point review of systems was otherwise negative except as noted.  [ ] Due to altered mental status/intubation, subjective information were not able to be obtained from the patient. History was obtained, to the extent possible, from review of the chart and collateral sources of information.      PHYSICAL EXAM:  Neurological:  A&Ox2 (not time)  PERRL  Tracks  No droop  tongue midline  SEVERINO  follows commands  Incision C/D/I - bogginess under incision  No drainage    LABS:                        7.9    9.61  )-----------( 166      ( 19 Oct 2024 05:44 )             24.4     10-19    144  |  111[H]  |  41[H]  ----------------------------<  133[H]  4.0   |  26  |  1.2    Ca    8.9      19 Oct 2024 05:44  Phos  3.2     10-18  Mg     1.7     10-19    TPro  4.6[L]  /  Alb  3.4[L]  /  TBili  0.8  /  DBili  x   /  AST  17  /  ALT  20  /  AlkPhos  92  10-19      Urinalysis Basic - ( 19 Oct 2024 05:44 )    Color: x / Appearance: x / SG: x / pH: x  Gluc: 133 mg/dL / Ketone: x  / Bili: x / Urobili: x   Blood: x / Protein: x / Nitrite: x   Leuk Esterase: x / RBC: x / WBC x   Sq Epi: x / Non Sq Epi: x / Bacteria: x    CAPILLARY BLOOD GLUCOSE      POCT Blood Glucose.: 249 mg/dL (19 Oct 2024 11:20)  POCT Blood Glucose.: 149 mg/dL (19 Oct 2024 08:11)  POCT Blood Glucose.: 170 mg/dL (18 Oct 2024 21:33)  POCT Blood Glucose.: 142 mg/dL (18 Oct 2024 17:15)      Allergies    No Known Allergies    Intolerances      MEDICATIONS:  Antibiotics:    Neuro:  levETIRAcetam 500 milliGRAM(s) Oral two times a day  traMADol 25 milliGRAM(s) Oral every 6 hours PRN  traMADol 50 milliGRAM(s) Oral every 6 hours PRN      IVF:      CULTURES:  Culture Results:   No growth to date. (10-16 @ 15:19)  Culture Results:   No growth at 5 days (10-08 @ 11:14)    ASSESSMENT:  79y Female s/p    Traumatic subdural hemorrhage without loss of consciousness, initial encounter    NOMD    NOMD    NOMD    NOMD    NOMD    Abnormal finding of blood chemistry, unspecified    Acute kidney failure, unspecified    Anemia, unspecified    Chronic cough    Chronic kidney disease, unspecified    Cough, unspecified    Cutaneous abscess of back [any part, except buttock]    Cutaneous abscess of limb, unspecified    Deficiency of other specified B group vitamins    Diarrhea, unspecified    Encounter for antineoplastic chemotherapy    Essential (primary) hypertension    FALL (ON) (FROM) UNSPECIFIED STAIRS AND STEPS, INIT ENCNTR    Fever, unspecified    History of falling    Hypomagnesemia    Long term (current) use of oral hypoglycemic drugs    Myelodysplastic syndrome, unspecified    Other drug-induced agranulocytosis    Other long term (current) drug therapy    Other specified disorders of nose and nasal sinuses    Pain in right knee    Pain in unspecified knee    Personal history of diseases of the blood and blood-forming organs and certain disorders involving the immune mechanism    Personal history of other endocrine, nutritional and metabolic disease    Personal history of other medical treatment    Personal history of other specified conditions    Pneumonia, unspecified organism    Shortness of breath    Synovial cyst of popliteal space [Rios], right knee    Thrombocytopenia, unspecified    Thrombocytosis, unspecified    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 disease    Unspecified abdominal pain    Unspecified fall, initial encounter    Unspecified place or not applicable    Chronic kidney disease, stage 3b    Disorder of kidney and ureter, unspecified    Irritable bowel syndrome with diarrhea    No pertinent family history in first degree relatives    No pertinent family history in first degree relatives    No pertinent family history in first degree relatives    Handoff    MEWS Score    DM (diabetes mellitus)    MDS (myelodysplastic syndrome)    Traumatic subdural hemorrhage with brief coma    Craniotomy, emergent, for subdural hematoma evacuation    Embolization of cerebral vessel    No significant past surgical history    H/O colonoscopy    FALL HIT HEAD    90+    [email or web address]        PLAN:  Continue neuro checks  Continue current managment

## 2024-10-20 LAB
ANION GAP SERPL CALC-SCNC: 9 MMOL/L — SIGNIFICANT CHANGE UP (ref 7–14)
APPEARANCE UR: ABNORMAL
BASOPHILS # BLD AUTO: 0.03 K/UL — SIGNIFICANT CHANGE UP (ref 0–0.2)
BASOPHILS NFR BLD AUTO: 0.3 % — SIGNIFICANT CHANGE UP (ref 0–1)
BILIRUB UR-MCNC: NEGATIVE — SIGNIFICANT CHANGE UP
BUN SERPL-MCNC: 36 MG/DL — HIGH (ref 10–20)
CALCIUM SERPL-MCNC: 8.7 MG/DL — SIGNIFICANT CHANGE UP (ref 8.4–10.4)
CHLORIDE SERPL-SCNC: 111 MMOL/L — HIGH (ref 98–110)
CO2 SERPL-SCNC: 24 MMOL/L — SIGNIFICANT CHANGE UP (ref 17–32)
COLOR SPEC: YELLOW — SIGNIFICANT CHANGE UP
CREAT SERPL-MCNC: 1.1 MG/DL — SIGNIFICANT CHANGE UP (ref 0.7–1.5)
DIFF PNL FLD: ABNORMAL
EGFR: 51 ML/MIN/1.73M2 — LOW
EOSINOPHIL # BLD AUTO: 0.2 K/UL — SIGNIFICANT CHANGE UP (ref 0–0.7)
EOSINOPHIL NFR BLD AUTO: 1.8 % — SIGNIFICANT CHANGE UP (ref 0–8)
GLUCOSE BLDC GLUCOMTR-MCNC: 136 MG/DL — HIGH (ref 70–99)
GLUCOSE BLDC GLUCOMTR-MCNC: 141 MG/DL — HIGH (ref 70–99)
GLUCOSE BLDC GLUCOMTR-MCNC: 238 MG/DL — HIGH (ref 70–99)
GLUCOSE SERPL-MCNC: 118 MG/DL — HIGH (ref 70–99)
GLUCOSE UR QL: NEGATIVE MG/DL — SIGNIFICANT CHANGE UP
HCT VFR BLD CALC: 23.6 % — LOW (ref 37–47)
HCT VFR BLD CALC: 23.6 % — LOW (ref 37–47)
HCT VFR BLD CALC: 24.4 % — LOW (ref 37–47)
HGB BLD-MCNC: 7.4 G/DL — LOW (ref 12–16)
HGB BLD-MCNC: 7.4 G/DL — LOW (ref 12–16)
HGB BLD-MCNC: 7.8 G/DL — LOW (ref 12–16)
IMM GRANULOCYTES NFR BLD AUTO: 4.5 % — HIGH (ref 0.1–0.3)
KETONES UR-MCNC: NEGATIVE MG/DL — SIGNIFICANT CHANGE UP
LEUKOCYTE ESTERASE UR-ACNC: ABNORMAL
LYMPHOCYTES # BLD AUTO: 1.31 K/UL — SIGNIFICANT CHANGE UP (ref 1.2–3.4)
LYMPHOCYTES # BLD AUTO: 12 % — LOW (ref 20.5–51.1)
MAGNESIUM SERPL-MCNC: 2.4 MG/DL — SIGNIFICANT CHANGE UP (ref 1.8–2.4)
MCHC RBC-ENTMCNC: 29.7 PG — SIGNIFICANT CHANGE UP (ref 27–31)
MCHC RBC-ENTMCNC: 29.8 PG — SIGNIFICANT CHANGE UP (ref 27–31)
MCHC RBC-ENTMCNC: 30.2 PG — SIGNIFICANT CHANGE UP (ref 27–31)
MCHC RBC-ENTMCNC: 31.4 G/DL — LOW (ref 32–37)
MCHC RBC-ENTMCNC: 31.4 G/DL — LOW (ref 32–37)
MCHC RBC-ENTMCNC: 32 G/DL — SIGNIFICANT CHANGE UP (ref 32–37)
MCV RBC AUTO: 94.6 FL — SIGNIFICANT CHANGE UP (ref 81–99)
MCV RBC AUTO: 94.8 FL — SIGNIFICANT CHANGE UP (ref 81–99)
MCV RBC AUTO: 95.2 FL — SIGNIFICANT CHANGE UP (ref 81–99)
MONOCYTES # BLD AUTO: 1.35 K/UL — HIGH (ref 0.1–0.6)
MONOCYTES NFR BLD AUTO: 12.4 % — HIGH (ref 1.7–9.3)
NEUTROPHILS # BLD AUTO: 7.52 K/UL — HIGH (ref 1.4–6.5)
NEUTROPHILS NFR BLD AUTO: 69 % — SIGNIFICANT CHANGE UP (ref 42.2–75.2)
NITRITE UR-MCNC: NEGATIVE — SIGNIFICANT CHANGE UP
NRBC # BLD: 0 /100 WBCS — SIGNIFICANT CHANGE UP (ref 0–0)
PH UR: 7.5 — SIGNIFICANT CHANGE UP (ref 5–8)
PLATELET # BLD AUTO: 215 K/UL — SIGNIFICANT CHANGE UP (ref 130–400)
PLATELET # BLD AUTO: 222 K/UL — SIGNIFICANT CHANGE UP (ref 130–400)
PLATELET # BLD AUTO: 228 K/UL — SIGNIFICANT CHANGE UP (ref 130–400)
PMV BLD: 11.1 FL — HIGH (ref 7.4–10.4)
PMV BLD: 11.2 FL — HIGH (ref 7.4–10.4)
PMV BLD: 11.4 FL — HIGH (ref 7.4–10.4)
POTASSIUM SERPL-MCNC: 3.6 MMOL/L — SIGNIFICANT CHANGE UP (ref 3.5–5)
POTASSIUM SERPL-SCNC: 3.6 MMOL/L — SIGNIFICANT CHANGE UP (ref 3.5–5)
PROT UR-MCNC: 300 MG/DL
RBC # BLD: 2.48 M/UL — LOW (ref 4.2–5.4)
RBC # BLD: 2.49 M/UL — LOW (ref 4.2–5.4)
RBC # BLD: 2.58 M/UL — LOW (ref 4.2–5.4)
RBC # FLD: 15.6 % — HIGH (ref 11.5–14.5)
RBC # FLD: 15.8 % — HIGH (ref 11.5–14.5)
RBC # FLD: 15.9 % — HIGH (ref 11.5–14.5)
SODIUM SERPL-SCNC: 144 MMOL/L — SIGNIFICANT CHANGE UP (ref 135–146)
SP GR SPEC: 1.02 — SIGNIFICANT CHANGE UP (ref 1–1.03)
UROBILINOGEN FLD QL: 0.2 MG/DL — SIGNIFICANT CHANGE UP (ref 0.2–1)
WBC # BLD: 10.9 K/UL — HIGH (ref 4.8–10.8)
WBC # BLD: 12.19 K/UL — HIGH (ref 4.8–10.8)
WBC # BLD: 13.1 K/UL — HIGH (ref 4.8–10.8)
WBC # FLD AUTO: 10.9 K/UL — HIGH (ref 4.8–10.8)
WBC # FLD AUTO: 12.19 K/UL — HIGH (ref 4.8–10.8)
WBC # FLD AUTO: 13.1 K/UL — HIGH (ref 4.8–10.8)

## 2024-10-20 PROCEDURE — 99232 SBSQ HOSP IP/OBS MODERATE 35: CPT

## 2024-10-20 PROCEDURE — 71045 X-RAY EXAM CHEST 1 VIEW: CPT | Mod: 26

## 2024-10-20 PROCEDURE — 76770 US EXAM ABDO BACK WALL COMP: CPT | Mod: 26

## 2024-10-20 RX ADMIN — Medication 10 MILLIGRAM(S): at 05:26

## 2024-10-20 RX ADMIN — Medication 200 MILLIGRAM(S): at 05:26

## 2024-10-20 RX ADMIN — HEPARIN SODIUM 5000 UNIT(S): 10000 INJECTION INTRAVENOUS; SUBCUTANEOUS at 13:15

## 2024-10-20 RX ADMIN — Medication 200 MILLIGRAM(S): at 23:48

## 2024-10-20 RX ADMIN — Medication 4: at 21:35

## 2024-10-20 RX ADMIN — HEPARIN SODIUM 5000 UNIT(S): 10000 INJECTION INTRAVENOUS; SUBCUTANEOUS at 21:35

## 2024-10-20 RX ADMIN — CHLORHEXIDINE GLUCONATE 1 APPLICATION(S): 40 SOLUTION TOPICAL at 05:26

## 2024-10-20 RX ADMIN — LEVETIRACETAM 500 MILLIGRAM(S): 500 TABLET, FILM COATED ORAL at 18:01

## 2024-10-20 RX ADMIN — MUPIROCIN 1 APPLICATION(S): 20 OINTMENT TOPICAL at 18:02

## 2024-10-20 RX ADMIN — HEPARIN SODIUM 5000 UNIT(S): 10000 INJECTION INTRAVENOUS; SUBCUTANEOUS at 05:26

## 2024-10-20 RX ADMIN — LEVETIRACETAM 500 MILLIGRAM(S): 500 TABLET, FILM COATED ORAL at 05:26

## 2024-10-20 RX ADMIN — MUPIROCIN 1 APPLICATION(S): 20 OINTMENT TOPICAL at 05:26

## 2024-10-20 RX ADMIN — Medication 200 MILLIGRAM(S): at 13:15

## 2024-10-20 NOTE — PROGRESS NOTE ADULT - ASSESSMENT
79F w/ PMHx of MDS (follows w/ Dr Wade, requires periodic transfusions for anemia), DM, HTN, CKD BIBEMS after being found unresponsive on the floor. CTH in the ED with R hemispheric SDH 2.2cm w/ mass effect and 1.1cm MLS s/p crani 10/9 and MMA embolization. Patient was admitted to NCCU. Was eventually extubated and had some stridor, for which she completed steroids. On 10/16, hospital course was complicated by leaking of fluid (?seroma) from crani site for which neurosurgery is following. On 10/17, hospital course further complicated by urinary retention, patient later able to void, and blood loss chronic anemia s/p 1u PRNC on 10/16.    #R SDH due to fall s/p Crani 10/8  #S/p MMA embolization 10/9  - intubated in ED for airway protection  - now comfortable on room air  - CTH stable  - patient noted to have an extracalvarial fluid collection s/p aspiration by neurosurgery, cultures NGTD  - NRSRGY: cw current mgmt, contact if csf leak  - diet per s/s  - f/u PT/OT  - c/w neurochecks     #HTN with hypertensive urgency on admission  - s/p nicardipine GTT  - BP controlled today  - cw Labetalol 200 q8H and amlodipine 10 mg daily   - patient on lisinopril at home, now being held due to hyperkalemia   - echo 10/9 - limited study, EF65-70%  - hold lasix for now  - repeat CXR in am    #MDS  - follows with Dr. Wade  - s/p 1 unit prbc  10/16/24  - monitor CBC, transfuse if Hb <7    #MARCO on CKD III  - resolved, had transient urinary retention, patient able to void  - c/w bladder scans q6H  - holding bicarb 650 Q8H (home dose)    Pending:   - monitor crani site  - F/u CXR in AM  - c/w neurochecks

## 2024-10-20 NOTE — PROGRESS NOTE ADULT - SUBJECTIVE AND OBJECTIVE BOX
S/P Right craniotomy for evac of SDH 10.8.24  S/P MMA Embolization 10.9.24    Pt seen and examined at bedside. Pt c/o mild HA, denies dizziness or visual changes.     Vital Signs Last 24 Hrs  T(C): 36.9 (20 Oct 2024 04:40), Max: 37.1 (19 Oct 2024 11:00)  T(F): 98.5 (20 Oct 2024 04:40), Max: 98.7 (19 Oct 2024 11:00)  HR: 70 (20 Oct 2024 04:40) (69 - 98)  BP: 145/68 (20 Oct 2024 04:40) (106/58 - 150/66)  BP(mean): 92 (19 Oct 2024 16:00) (89 - 92)  RR: 18 (20 Oct 2024 04:40) (18 - 18)  SpO2: 97% (20 Oct 2024 04:40) (96% - 97%)      I&O's Detail    19 Oct 2024 07:01  -  20 Oct 2024 07:00  --------------------------------------------------------  IN:    IV PiggyBack: 50 mL  Total IN: 50 mL    OUT:    Voided (mL): 1300 mL  Total OUT: 1300 mL    Total NET: -1250 mL        I&O's Summary    19 Oct 2024 07:01  -  20 Oct 2024 07:00  --------------------------------------------------------  IN: 50 mL / OUT: 1300 mL / NET: -1250 mL      REVIEW OF SYSTEMS    [X] A ten-point review of systems was otherwise negative except as noted.  [ ] Due to altered mental status/intubation, subjective information were not able to be obtained from the patient. History was obtained, to the extent possible, from review of the chart and collateral sources of information.      PHYSICAL EXAM:  Neurological:  On PE:    A&Ox3 with clear speech  PERRL  EOMI  No droop  tongue midline  SEVERINO - good strength  Follows complex commands  Incision C/D/I - bogginess under incision  No drainage    LABS:                        7.8    10.90 )-----------( 215      ( 20 Oct 2024 05:59 )             24.4     10-20    144  |  111[H]  |  36[H]  ----------------------------<  118[H]  3.6   |  24  |  1.1    Ca    8.7      20 Oct 2024 05:59  Mg     2.4     10-20    TPro  4.6[L]  /  Alb  3.4[L]  /  TBili  0.8  /  DBili  x   /  AST  17  /  ALT  20  /  AlkPhos  92  10-19      Urinalysis Basic - ( 20 Oct 2024 05:59 )    Color: x / Appearance: x / SG: x / pH: x  Gluc: 118 mg/dL / Ketone: x  / Bili: x / Urobili: x   Blood: x / Protein: x / Nitrite: x   Leuk Esterase: x / RBC: x / WBC x   Sq Epi: x / Non Sq Epi: x / Bacteria: x    CAPILLARY BLOOD GLUCOSE      POCT Blood Glucose.: 136 mg/dL (20 Oct 2024 08:15)  POCT Blood Glucose.: 125 mg/dL (19 Oct 2024 21:25)  POCT Blood Glucose.: 129 mg/dL (19 Oct 2024 16:17)  POCT Blood Glucose.: 249 mg/dL (19 Oct 2024 11:20)      Allergies    No Known Allergies    Intolerances      MEDICATIONS:  Antibiotics:    Neuro:  levETIRAcetam 500 milliGRAM(s) Oral two times a day  traMADol 25 milliGRAM(s) Oral every 6 hours PRN  traMADol 50 milliGRAM(s) Oral every 6 hours PRN      IVF:      CULTURES:  Culture Results:   No growth to date. (10-16 @ 15:19)  Culture Results:   No growth at 5 days (10-08 @ 11:14)    ASSESSMENT:  79y Female s/p    Traumatic subdural hemorrhage without loss of consciousness, initial encounter    NOMD    NOMD    NOMD    NOMD    NOMD    Abnormal finding of blood chemistry, unspecified    Acute kidney failure, unspecified    Anemia, unspecified    Chronic cough    Chronic kidney disease, unspecified    Cough, unspecified    Cutaneous abscess of back [any part, except buttock]    Cutaneous abscess of limb, unspecified    Deficiency of other specified B group vitamins    Diarrhea, unspecified    Encounter for antineoplastic chemotherapy    Essential (primary) hypertension    FALL (ON) (FROM) UNSPECIFIED STAIRS AND STEPS, INIT ENCNTR    Fever, unspecified    History of falling    Hypomagnesemia    Long term (current) use of oral hypoglycemic drugs    Myelodysplastic syndrome, unspecified    Other drug-induced agranulocytosis    Other long term (current) drug therapy    Other specified disorders of nose and nasal sinuses    Pain in right knee    Pain in unspecified knee    Personal history of diseases of the blood and blood-forming organs and certain disorders involving the immune mechanism    Personal history of other endocrine, nutritional and metabolic disease    Personal history of other medical treatment    Personal history of other specified conditions    Pneumonia, unspecified organism    Shortness of breath    Synovial cyst of popliteal space [Rios], right knee    Thrombocytopenia, unspecified    Thrombocytosis, unspecified    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 disease    Unspecified abdominal pain    Unspecified fall, initial encounter    Unspecified place or not applicable    Chronic kidney disease, stage 3b    Disorder of kidney and ureter, unspecified    Irritable bowel syndrome with diarrhea    No pertinent family history in first degree relatives    No pertinent family history in first degree relatives    No pertinent family history in first degree relatives    Handoff    MEWS Score    DM (diabetes mellitus)    MDS (myelodysplastic syndrome)    Traumatic subdural hemorrhage with brief coma    Craniotomy, emergent, for subdural hematoma evacuation    Embolization of cerebral vessel    No significant past surgical history    H/O colonoscopy    FALL HIT HEAD    90+    [email or web address]        PLAN:  Continue current management  PT/OT

## 2024-10-20 NOTE — PROGRESS NOTE ADULT - ASSESSMENT
79F w/ PMHx of MDS (follows w/ Dr Wade, requires periodic transfusions for anemia), DM, HTN, CKD BIBEMS after being found unresponsive on the floor. CTH in the ED with R SDH s/p crani 10/9 and MMA embolization. Patient was admitted to [facility]. Was eventually extubated and had some stridor, for which she completed steroids. Hospital course complicated by leaking of fluid (?seroma) from crani site for which neurosurgery is following. Hospital course further complicate by urinary retention, patient later able to void, and blood loss anemia s/p 1u PRNC on 10/16, patient now downgraded to SDU     R SDH due to fall s/p Crani 10/8  S/p MMA embolization 10/9  - intubated in ED for airway protection  - now comfortable on room air  - serial CTHs stable, discussed with Dr Ortiz  - patient noted to have an extracalvarial fluid collection s/p aspiration by neurosurgery, cultures NGTD  - discussed with neurosurgery, contact them if pt observed leaking fluid from crani site   - c/w neurochecks q8h  - diet per s/s  - PT/OT    HTN with hypertensive urgency on admission  - s/p nicadipine GTT  - currently BP acceptable, continue with Labetalol 200 q8H and amlodipine 10 mg daily   - patient on lisinopril at home, now being held due to hyperkalemia   - echo 10/9 - limited study, EF65-70%  - hold lasix for now and repeat chest xray in AM  - currently on room air    MARCO on CKD III  - resolved, had transient urinary retention, patient able to void  - c/w bladder scans q6H  - holding bicarb 650 Q8H (home dose)    H/o MDS  - follows with Dr Wade  - monitor CBC, transfuse if Hb <7  - s/p 1 unit prbc  10/16/24    All discussed with patient  Pending: monitor crani site, neurosurgery fu, cbc,  PT/OT    Patient seen at bedside, total time spent to evaluate and treat the patient's acute illness and chronic medical conditions as well as time spent reviewing prior records, labs, radiology, documenting in electronic medical records,  discussing medical plan with   medical team was more than 40 minutes with >50% of time spent face to face with patient, discussing with patient/family as well as coordination of care

## 2024-10-20 NOTE — PROGRESS NOTE ADULT - SUBJECTIVE AND OBJECTIVE BOX
SUBJECTIVE/OVERNIGHT EVENTS  Today is hospital day 12d. This morning patient was seen and examined at bedside, resting comfortably in bed. No acute or major events overnight.      CODE STATUS: FULL      MEDICATIONS  STANDING MEDICATIONS  amLODIPine   Tablet 10 milliGRAM(s) Oral daily  chlorhexidine 2% Cloths 1 Application(s) Topical <User Schedule>  dextrose 50% Injectable 50 milliLiter(s) IV Push every 15 minutes  dextrose 50% Injectable 25 milliLiter(s) IV Push every 15 minutes  dextrose 50% Injectable 25 milliLiter(s) IV Push every 15 minutes  heparin   Injectable 5000 Unit(s) SubCutaneous every 8 hours  insulin lispro (ADMELOG) corrective regimen sliding scale   SubCutaneous Before meals and at bedtime  labetalol 200 milliGRAM(s) Oral every 8 hours  levETIRAcetam 500 milliGRAM(s) Oral two times a day  mupirocin 2% Ointment 1 Application(s) Topical every 12 hours  polyethylene glycol 3350 17 Gram(s) Oral daily  senna 2 Tablet(s) Oral at bedtime    PRN MEDICATIONS  traMADol 25 milliGRAM(s) Oral every 6 hours PRN  traMADol 50 milliGRAM(s) Oral every 6 hours PRN    VITALS  T(F): 98 (10-20-24 @ 00:14), Max: 98.7 (10-19-24 @ 11:00)  HR: 69 (10-20-24 @ 00:14) (68 - 98)  BP: 106/58 (10-20-24 @ 00:14) (106/58 - 172/68)  RR: 18 (10-20-24 @ 00:14) (18 - 18)  SpO2: 97% (10-20-24 @ 00:14) (95% - 97%)  POCT Blood Glucose.: 125 mg/dL (10-19-24 @ 21:25)  POCT Blood Glucose.: 129 mg/dL (10-19-24 @ 16:17)  POCT Blood Glucose.: 249 mg/dL (10-19-24 @ 11:20)  POCT Blood Glucose.: 149 mg/dL (10-19-24 @ 08:11)    PHYSICAL EXAM  GENERAL: NAD  HEAD:  Atraumatic, normocephalic  EYES: EOMI, PERRL  NECK: Supple, trachea midline, no JVD  HEART: Regular rate and rhythm  LUNGS: Clear to auscultation bilaterally, no crackles, wheezing, or rhonchi  ABDOMEN: Soft, nontender, nondistended, +BS  EXTREMITIES: 2+ peripheral pulses bilaterally. B/L edema  NERVOUS SYSTEM:  A&Ox2, moving all extremities      (  ) Indwelling Small Catheter   Date inserted:    Reason (  ) Critical illness     (  ) urinary retention    (  ) Accurate Ins/Outs Monitoring     (  ) CMO patient    (  ) Central Line  Date inserted:  Location: (  ) Right IJ   (  ) Left IJ   (  ) Right Fem   (  ) Left Fem    (  ) SPC  (  ) pigtail  (  ) PEG tube  (  ) colostomy  (  ) jejunostomy  (  ) U-Dall    LABS             7.9    9.61  )-----------( 166      ( 10-19-24 @ 05:44 )             24.4     144  |  111  |  41  -------------------------<  133   10-19-24 @ 05:44  4.0  |  26  |  1.2    Ca      8.9     10-19-24 @ 05:44  Phos   3.2     10-18-24 @ 04:39  Mg     1.7     10-19-24 @ 05:44    TPro  4.6  /  Alb  3.4  /  TBili  0.8  /  DBili  x   /  AST  17  /  ALT  20  /  AlkPhos  92  /  GGT  x     10-19-24 @ 05:44        Urinalysis Basic - ( 19 Oct 2024 05:44 )    Color: x / Appearance: x / SG: x / pH: x  Gluc: 133 mg/dL / Ketone: x  / Bili: x / Urobili: x   Blood: x / Protein: x / Nitrite: x   Leuk Esterase: x / RBC: x / WBC x   Sq Epi: x / Non Sq Epi: x / Bacteria: x          IMAGING

## 2024-10-20 NOTE — PROGRESS NOTE ADULT - SUBJECTIVE AND OBJECTIVE BOX
SIMONA KUHN  79y Female    CHIEF COMPLAINT:    Patient is a 79y old  Female who presents with a chief complaint of Traumatic subdural hemorrhage (20 Oct 2024 00:31)    INTERVAL HPI/OVERNIGHT EVENTS:    Patient seen and examined. No acute events overnight. OVerall unchanged, no active complaints     ROS: All other systems are negative.    Vital Signs:    T(F): 98.5 (10-20-24 @ 04:40), Max: 98.7 (10-19-24 @ 11:00)  HR: 70 (10-20-24 @ 04:40) (69 - 98)  BP: 145/68 (10-20-24 @ 04:40) (106/58 - 150/66)  RR: 18 (10-20-24 @ 04:40) (18 - 18)  SpO2: 97% (10-20-24 @ 04:40) (96% - 97%)    19 Oct 2024 07:01  -  20 Oct 2024 07:00  --------------------------------------------------------  IN: 50 mL / OUT: 1300 mL / NET: -1250 mL    POCT Blood Glucose.: 136 mg/dL (20 Oct 2024 08:15)  POCT Blood Glucose.: 125 mg/dL (19 Oct 2024 21:25)  POCT Blood Glucose.: 129 mg/dL (19 Oct 2024 16:17)  POCT Blood Glucose.: 249 mg/dL (19 Oct 2024 11:20)    PHYSICAL EXAM:    GENERAL:  NAD  SKIN: No rashes or lesions  HEENT: Atraumatic. Normocephalic. healing post crani scar, no discharge   NECK: Supple, No JVD. No lymphadenopathy.  PULMONARY: CTA B/L. No wheezing. No rales  CVS: Normal S1, S2. Rate and Rhythm are regular   ABDOMEN/GI: Soft, Nontender, Nondistended   MSK:  No clubbing or cyanosis   NEUROLOGIC: follows commands   PSYCH: Awake and alert     Consultant(s) Notes Reviewed:  [x ] YES  [ ] NO  Care Discussed with Consultants/Other Providers [ x] YES  [ ] NO    LABS:                        7.8    10.90 )-----------( 215      ( 20 Oct 2024 05:59 )             24.4     144  |  111[H]  |  36[H]  ----------------------------<  118[H]  3.6   |  24  |  1.1    Ca    8.7      20 Oct 2024 05:59  Mg     2.4     10-20    TPro  4.6[L]  /  Alb  3.4[L]  /  TBili  0.8  /  DBili  x   /  AST  17  /  ALT  20  /  AlkPhos  92  10-19    RADIOLOGY & ADDITIONAL TESTS:  Imaging or report Personally Reviewed:  [x] YES  [ ] NO  EKG reviewed: [x] YES  [ ] NO    Medications:  Standing  amLODIPine   Tablet 10 milliGRAM(s) Oral daily  chlorhexidine 2% Cloths 1 Application(s) Topical <User Schedule>  dextrose 50% Injectable 25 milliLiter(s) IV Push every 15 minutes  dextrose 50% Injectable 25 milliLiter(s) IV Push every 15 minutes  dextrose 50% Injectable 50 milliLiter(s) IV Push every 15 minutes  heparin   Injectable 5000 Unit(s) SubCutaneous every 8 hours  insulin lispro (ADMELOG) corrective regimen sliding scale   SubCutaneous Before meals and at bedtime  labetalol 200 milliGRAM(s) Oral every 8 hours  levETIRAcetam 500 milliGRAM(s) Oral two times a day  mupirocin 2% Ointment 1 Application(s) Topical every 12 hours  polyethylene glycol 3350 17 Gram(s) Oral daily  senna 2 Tablet(s) Oral at bedtime    PRN Meds  traMADol 50 milliGRAM(s) Oral every 6 hours PRN  traMADol 25 milliGRAM(s) Oral every 6 hours PRN

## 2024-10-20 NOTE — CHART NOTE - NSCHARTNOTEFT_GEN_A_CORE
Patient was found by nurse to have large volume bleeding from her primafit.  Patient was examined and was found to be asymptomatic, no lethargy, improved weakness from yesterday, no pain with urination. No tenderness or jaundice on exam. Cap refill is normal.   UA was sent and showed large RBC, large LE, numerous bacteria, large WBC.  UCx was ordered.   CBC was ordered stat and showed slightly decreased Hb compared to earlier in the day.  RBUS was ordered urgent and is pending.  Will f/u 8pm CBC and transfuse as necessary if HB <7.

## 2024-10-21 LAB
ANION GAP SERPL CALC-SCNC: 10 MMOL/L — SIGNIFICANT CHANGE UP (ref 7–14)
BASOPHILS # BLD AUTO: 0.03 K/UL — SIGNIFICANT CHANGE UP (ref 0–0.2)
BASOPHILS NFR BLD AUTO: 0.2 % — SIGNIFICANT CHANGE UP (ref 0–1)
BUN SERPL-MCNC: 32 MG/DL — HIGH (ref 10–20)
CALCIUM SERPL-MCNC: 8.7 MG/DL — SIGNIFICANT CHANGE UP (ref 8.4–10.5)
CHLORIDE SERPL-SCNC: 109 MMOL/L — SIGNIFICANT CHANGE UP (ref 98–110)
CO2 SERPL-SCNC: 24 MMOL/L — SIGNIFICANT CHANGE UP (ref 17–32)
CREAT SERPL-MCNC: 1.3 MG/DL — SIGNIFICANT CHANGE UP (ref 0.7–1.5)
CULTURE RESULTS: NO GROWTH — SIGNIFICANT CHANGE UP
EGFR: 42 ML/MIN/1.73M2 — LOW
EOSINOPHIL # BLD AUTO: 0.17 K/UL — SIGNIFICANT CHANGE UP (ref 0–0.7)
EOSINOPHIL NFR BLD AUTO: 1.3 % — SIGNIFICANT CHANGE UP (ref 0–8)
GLUCOSE BLDC GLUCOMTR-MCNC: 113 MG/DL — HIGH (ref 70–99)
GLUCOSE BLDC GLUCOMTR-MCNC: 129 MG/DL — HIGH (ref 70–99)
GLUCOSE BLDC GLUCOMTR-MCNC: 135 MG/DL — HIGH (ref 70–99)
GLUCOSE BLDC GLUCOMTR-MCNC: 202 MG/DL — HIGH (ref 70–99)
GLUCOSE SERPL-MCNC: 97 MG/DL — SIGNIFICANT CHANGE UP (ref 70–99)
HCT VFR BLD CALC: 22.1 % — LOW (ref 37–47)
HCT VFR BLD CALC: 28.3 % — LOW (ref 37–47)
HGB BLD-MCNC: 7 G/DL — LOW (ref 12–16)
HGB BLD-MCNC: 9.3 G/DL — LOW (ref 12–16)
IMM GRANULOCYTES NFR BLD AUTO: 3.9 % — HIGH (ref 0.1–0.3)
LYMPHOCYTES # BLD AUTO: 1.09 K/UL — LOW (ref 1.2–3.4)
LYMPHOCYTES # BLD AUTO: 8.4 % — LOW (ref 20.5–51.1)
MAGNESIUM SERPL-MCNC: 2.2 MG/DL — SIGNIFICANT CHANGE UP (ref 1.8–2.4)
MCHC RBC-ENTMCNC: 29.9 PG — SIGNIFICANT CHANGE UP (ref 27–31)
MCHC RBC-ENTMCNC: 30.8 PG — SIGNIFICANT CHANGE UP (ref 27–31)
MCHC RBC-ENTMCNC: 31.7 G/DL — LOW (ref 32–37)
MCHC RBC-ENTMCNC: 32.9 G/DL — SIGNIFICANT CHANGE UP (ref 32–37)
MCV RBC AUTO: 93.7 FL — SIGNIFICANT CHANGE UP (ref 81–99)
MCV RBC AUTO: 94.4 FL — SIGNIFICANT CHANGE UP (ref 81–99)
MONOCYTES # BLD AUTO: 1.62 K/UL — HIGH (ref 0.1–0.6)
MONOCYTES NFR BLD AUTO: 12.5 % — HIGH (ref 1.7–9.3)
NEUTROPHILS # BLD AUTO: 9.59 K/UL — HIGH (ref 1.4–6.5)
NEUTROPHILS NFR BLD AUTO: 73.7 % — SIGNIFICANT CHANGE UP (ref 42.2–75.2)
NRBC # BLD: 0 /100 WBCS — SIGNIFICANT CHANGE UP (ref 0–0)
NRBC # BLD: 0 /100 WBCS — SIGNIFICANT CHANGE UP (ref 0–0)
PLATELET # BLD AUTO: 243 K/UL — SIGNIFICANT CHANGE UP (ref 130–400)
PLATELET # BLD AUTO: 245 K/UL — SIGNIFICANT CHANGE UP (ref 130–400)
PMV BLD: 10.7 FL — HIGH (ref 7.4–10.4)
PMV BLD: 10.8 FL — HIGH (ref 7.4–10.4)
POTASSIUM SERPL-MCNC: 3.3 MMOL/L — LOW (ref 3.5–5)
POTASSIUM SERPL-SCNC: 3.3 MMOL/L — LOW (ref 3.5–5)
RBC # BLD: 2.34 M/UL — LOW (ref 4.2–5.4)
RBC # BLD: 3.02 M/UL — LOW (ref 4.2–5.4)
RBC # FLD: 14.8 % — HIGH (ref 11.5–14.5)
RBC # FLD: 15.7 % — HIGH (ref 11.5–14.5)
SODIUM SERPL-SCNC: 143 MMOL/L — SIGNIFICANT CHANGE UP (ref 135–146)
SPECIMEN SOURCE: SIGNIFICANT CHANGE UP
WBC # BLD: 13.01 K/UL — HIGH (ref 4.8–10.8)
WBC # BLD: 18.68 K/UL — HIGH (ref 4.8–10.8)
WBC # FLD AUTO: 13.01 K/UL — HIGH (ref 4.8–10.8)
WBC # FLD AUTO: 18.68 K/UL — HIGH (ref 4.8–10.8)

## 2024-10-21 PROCEDURE — 99232 SBSQ HOSP IP/OBS MODERATE 35: CPT

## 2024-10-21 RX ORDER — PIPERACILLIN AND TAZOBACTAM .5; 4 G/20ML; G/20ML
3.38 INJECTION, POWDER, LYOPHILIZED, FOR SOLUTION INTRAVENOUS EVERY 8 HOURS
Refills: 0 | Status: ACTIVE | OUTPATIENT
Start: 2024-10-21 | End: 2024-10-24

## 2024-10-21 RX ORDER — PANTOPRAZOLE SODIUM 40 MG/1
40 TABLET, DELAYED RELEASE ORAL
Refills: 0 | Status: DISCONTINUED | OUTPATIENT
Start: 2024-10-21 | End: 2024-10-27

## 2024-10-21 RX ORDER — PIPERACILLIN AND TAZOBACTAM .5; 4 G/20ML; G/20ML
3.38 INJECTION, POWDER, LYOPHILIZED, FOR SOLUTION INTRAVENOUS ONCE
Refills: 0 | Status: COMPLETED | OUTPATIENT
Start: 2024-10-21 | End: 2024-10-21

## 2024-10-21 RX ORDER — POTASSIUM CHLORIDE 10 MEQ
40 TABLET, EXTENDED RELEASE ORAL ONCE
Refills: 0 | Status: COMPLETED | OUTPATIENT
Start: 2024-10-21 | End: 2024-10-21

## 2024-10-21 RX ADMIN — Medication 200 MILLIGRAM(S): at 05:19

## 2024-10-21 RX ADMIN — LEVETIRACETAM 500 MILLIGRAM(S): 500 TABLET, FILM COATED ORAL at 05:19

## 2024-10-21 RX ADMIN — PIPERACILLIN AND TAZOBACTAM 200 GRAM(S): .5; 4 INJECTION, POWDER, LYOPHILIZED, FOR SOLUTION INTRAVENOUS at 10:41

## 2024-10-21 RX ADMIN — Medication 4: at 17:13

## 2024-10-21 RX ADMIN — HEPARIN SODIUM 5000 UNIT(S): 10000 INJECTION INTRAVENOUS; SUBCUTANEOUS at 14:07

## 2024-10-21 RX ADMIN — HEPARIN SODIUM 5000 UNIT(S): 10000 INJECTION INTRAVENOUS; SUBCUTANEOUS at 05:20

## 2024-10-21 RX ADMIN — Medication 200 MILLIGRAM(S): at 14:06

## 2024-10-21 RX ADMIN — PIPERACILLIN AND TAZOBACTAM 25 GRAM(S): .5; 4 INJECTION, POWDER, LYOPHILIZED, FOR SOLUTION INTRAVENOUS at 22:19

## 2024-10-21 RX ADMIN — MUPIROCIN 1 APPLICATION(S): 20 OINTMENT TOPICAL at 17:16

## 2024-10-21 RX ADMIN — CHLORHEXIDINE GLUCONATE 1 APPLICATION(S): 40 SOLUTION TOPICAL at 05:20

## 2024-10-21 RX ADMIN — HEPARIN SODIUM 5000 UNIT(S): 10000 INJECTION INTRAVENOUS; SUBCUTANEOUS at 22:21

## 2024-10-21 RX ADMIN — POLYETHYLENE GLYCOL 3350 17 GRAM(S): 17 POWDER, FOR SOLUTION ORAL at 11:36

## 2024-10-21 RX ADMIN — Medication 40 MILLIEQUIVALENT(S): at 10:37

## 2024-10-21 RX ADMIN — Medication 200 MILLIGRAM(S): at 22:21

## 2024-10-21 RX ADMIN — PANTOPRAZOLE SODIUM 40 MILLIGRAM(S): 40 TABLET, DELAYED RELEASE ORAL at 10:41

## 2024-10-21 RX ADMIN — PIPERACILLIN AND TAZOBACTAM 25 GRAM(S): .5; 4 INJECTION, POWDER, LYOPHILIZED, FOR SOLUTION INTRAVENOUS at 11:36

## 2024-10-21 RX ADMIN — MUPIROCIN 1 APPLICATION(S): 20 OINTMENT TOPICAL at 05:20

## 2024-10-21 RX ADMIN — Medication 10 MILLIGRAM(S): at 05:19

## 2024-10-21 RX ADMIN — LEVETIRACETAM 500 MILLIGRAM(S): 500 TABLET, FILM COATED ORAL at 17:15

## 2024-10-21 RX ADMIN — Medication 100 MILLIGRAM(S): at 11:36

## 2024-10-21 NOTE — PROGRESS NOTE ADULT - SUBJECTIVE AND OBJECTIVE BOX
SUBJECTIVE/OVERNIGHT EVENTS  Today is hospital day 13d. This morning patient was seen and examined at bedside, resting comfortably in bed. No acute or major events overnight. Patient states she is not feeling any pain. Expresses that she is tired and would like to sleep.      Traumatic subdural hemorrhage without loss of consciousness, initial encounter    NOMD    NOMD    NOMD    NOMD    NOMD    Abnormal finding of blood chemistry, unspecified    Acute kidney failure, unspecified    Anemia, unspecified    Chronic cough    Chronic kidney disease, unspecified    Cough, unspecified    Cutaneous abscess of back [any part, except buttock]    Cutaneous abscess of limb, unspecified    Deficiency of other specified B group vitamins    Diarrhea, unspecified    Encounter for antineoplastic chemotherapy    Essential (primary) hypertension    FALL (ON) (FROM) UNSPECIFIED STAIRS AND STEPS, INIT ENCNTR    Fever, unspecified    History of falling    Hypomagnesemia    Long term (current) use of oral hypoglycemic drugs    Myelodysplastic syndrome, unspecified    Other drug-induced agranulocytosis    Other long term (current) drug therapy    Other specified disorders of nose and nasal sinuses    Pain in right knee    Pain in unspecified knee    Personal history of diseases of the blood and blood-forming organs and certain disorders involving the immune mechanism    Personal history of other endocrine, nutritional and metabolic disease    Personal history of other medical treatment    Personal history of other specified conditions    Pneumonia, unspecified organism    Shortness of breath    Synovial cyst of popliteal space [Rios], right knee    Thrombocytopenia, unspecified    Thrombocytosis, unspecified    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 disease    Unspecified abdominal pain    Unspecified fall, initial encounter    Unspecified place or not applicable    Chronic kidney disease, stage 3b    Disorder of kidney and ureter, unspecified    Irritable bowel syndrome with diarrhea    No pertinent family history in first degree relatives    No pertinent family history in first degree relatives    No pertinent family history in first degree relatives    Handoff    MEWS Score    DM (diabetes mellitus)    MDS (myelodysplastic syndrome)    Traumatic subdural hemorrhage with brief coma    Craniotomy, emergent, for subdural hematoma evacuation    Embolization of cerebral vessel    No significant past surgical history    H/O colonoscopy    FALL HIT HEAD    90+    SysAdmin_VstLnk        MEDICATIONS  STANDING MEDICATIONS  amLODIPine   Tablet 10 milliGRAM(s) Oral daily  chlorhexidine 2% Cloths 1 Application(s) Topical <User Schedule>  dextrose 50% Injectable 50 milliLiter(s) IV Push every 15 minutes  dextrose 50% Injectable 25 milliLiter(s) IV Push every 15 minutes  dextrose 50% Injectable 25 milliLiter(s) IV Push every 15 minutes  heparin   Injectable 5000 Unit(s) SubCutaneous every 8 hours  insulin lispro (ADMELOG) corrective regimen sliding scale   SubCutaneous Before meals and at bedtime  labetalol 200 milliGRAM(s) Oral every 8 hours  levETIRAcetam 500 milliGRAM(s) Oral two times a day  mupirocin 2% Ointment 1 Application(s) Topical every 12 hours  pantoprazole    Tablet 40 milliGRAM(s) Oral before breakfast  piperacillin/tazobactam IVPB. 3.375 Gram(s) IV Intermittent once  piperacillin/tazobactam IVPB.- 3.375 Gram(s) IV Intermittent once  piperacillin/tazobactam IVPB.. 3.375 Gram(s) IV Intermittent every 8 hours  polyethylene glycol 3350 17 Gram(s) Oral daily  potassium chloride   Powder 40 milliEquivalent(s) Oral once  senna 2 Tablet(s) Oral at bedtime    PRN MEDICATIONS  traMADol 25 milliGRAM(s) Oral every 6 hours PRN  traMADol 50 milliGRAM(s) Oral every 6 hours PRN    VITALS  T(F): 98.4 (10-21-24 @ 08:11), Max: 98.6 (10-20-24 @ 22:50)  HR: 68 (10-21-24 @ 08:11) (60 - 88)  BP: 127/61 (10-21-24 @ 08:11) (122/57 - 152/63)  RR: 18 (10-21-24 @ 08:11) (18 - 18)  SpO2: 92% (10-21-24 @ 08:11) (92% - 98%)  POCT Blood Glucose.: 113 mg/dL (10-21-24 @ 08:02)  POCT Blood Glucose.: 238 mg/dL (10-20-24 @ 20:45)  POCT Blood Glucose.: 141 mg/dL (10-20-24 @ 11:16)    PHYSICAL EXAM  GENERAL  ( x ) NAD, lying in bed comfortably     (  ) obtunded     (  ) lethargic     (  ) somnolent    HEAD  ( x ) staples and sutures from cani      (  ) hematoma     (  ) laceration (specify location:       )     NECK  ( x ) Supple     (  ) neck stiffness     (  ) nuchal rigidity     (  )  no JVD     (  ) JVD present ( -- cm)    HEART  Rate -->  ( x ) normal rate    (  ) bradycardic    (  ) tachycardic  Rhythm -->  ( x ) regular    (  ) regularly irregular    (  ) irregularly irregular  Murmurs -->  (  ) normal s1/s2    (  ) systolic murmur    (  ) diastolic murmur    (  ) continuous murmur     (  ) S3 present    (  ) S4 present    LUNGS  ( x )Unlabored respirations     (  ) tachypnea  (  ) B/L air entry     (  ) decreased breath sounds in:  (location     )    (  ) no adventitious sound     (  ) crackles     (  ) wheezing      (  ) rhonchi      (specify location:       )  (  ) chest wall tenderness (specify location:       )    ABDOMEN  ( x ) Soft     (  ) tense   |   ( x ) nondistended     (  ) distended   |   (  ) +BS     (  ) hypoactive bowel sounds     (  ) hyperactive bowel sounds  ( x ) nontender     (  ) RUQ tenderness     (  ) RLQ tenderness     (  ) LLQ tenderness     (  ) epigastric tenderness     (  ) diffuse tenderness  (  ) Splenomegaly      (  ) Hepatomegaly      (  ) Jaundice     (  ) ecchymosis     NERVOUS SYSTEM  ( x ) A&Ox2     (  ) confused     (  ) lethargic  CN II-XII:     (  ) Intact     (  ) focal deficits  (Specify:     )   Upper extremities:     (  ) strength X/5     (  ) focal deficit (specify:    )  Lower extremities:     (  ) strength  X/5    (  ) focal deficit (specify:    )      LABS             7.0    13.01 )-----------( 243      ( 10-21-24 @ 06:23 )             22.1     143  |  109  |  32  -------------------------<  97   10-21-24 @ 06:23  3.3  |  24  |  1.3    Ca      8.7     10-21-24 @ 06:23  Mg     2.2     10-21-24 @ 06:23          Urinalysis Basic - ( 21 Oct 2024 06:23 )    Color: x / Appearance: x / SG: x / pH: x  Gluc: 97 mg/dL / Ketone: x  / Bili: x / Urobili: x   Blood: x / Protein: x / Nitrite: x   Leuk Esterase: x / RBC: x / WBC x   Sq Epi: x / Non Sq Epi: x / Bacteria: x          IMAGING

## 2024-10-21 NOTE — PROVIDER CONTACT NOTE (OTHER) - SITUATION
Patient has no iv access. several nurses unsuccessful at placing iv
Patient had accident in bed earlier at the start of shift around 2000 and we were unable to collect urine amount. Patient has been hooked up to [employer] and has not voided since.

## 2024-10-21 NOTE — PROGRESS NOTE ADULT - SUBJECTIVE AND OBJECTIVE BOX
Subjective: 79yFemale with a pmhx of Traumatic subdural hemorrhage without loss of consciousness, initial encounter    S/P Right craniotomy for evac of SDH 10.8.24  S/P MMA Embolization 10.9.24    Pt seen and examined at bedside in 4C. Pt sleepy A&Ox2 (person, place) at this time. Denies any complaints at present, pt uncooperative to follow some commands, states she would just like to get rest.         NOMD    NOMD    NOMD    NOMD    NOMD    Abnormal finding of blood chemistry, unspecified    Acute kidney failure, unspecified    Anemia, unspecified    Chronic cough    Chronic kidney disease, unspecified    Cough, unspecified    Cutaneous abscess of back [any part, except buttock]    Cutaneous abscess of limb, unspecified    Deficiency of other specified B group vitamins    Diarrhea, unspecified    Encounter for antineoplastic chemotherapy    Essential (primary) hypertension    FALL (ON) (FROM) UNSPECIFIED STAIRS AND STEPS, INIT ENCNTR    Fever, unspecified    History of falling    Hypomagnesemia    Long term (current) use of oral hypoglycemic drugs    Myelodysplastic syndrome, unspecified    Other drug-induced agranulocytosis    Other long term (current) drug therapy    Other specified disorders of nose and nasal sinuses    Pain in right knee    Pain in unspecified knee    Personal history of diseases of the blood and blood-forming organs and certain disorders involving the immune mechanism    Personal history of other endocrine, nutritional and metabolic disease    Personal history of other medical treatment    Personal history of other specified conditions    Pneumonia, unspecified organism    Shortness of breath    Synovial cyst of popliteal space [Rios], right knee    Thrombocytopenia, unspecified    Thrombocytosis, unspecified    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 disease    Unspecified abdominal pain    Unspecified fall, initial encounter    Unspecified place or not applicable    Chronic kidney disease, stage 3b    Disorder of kidney and ureter, unspecified    Irritable bowel syndrome with diarrhea    No pertinent family history in first degree relatives    No pertinent family history in first degree relatives    No pertinent family history in first degree relatives    Handoff    MEWS Score    DM (diabetes mellitus)    MDS (myelodysplastic syndrome)    Traumatic subdural hemorrhage with brief coma    Craniotomy, emergent, for subdural hematoma evacuation    Embolization of cerebral vessel    No significant past surgical history    H/O colonoscopy    FALL HIT HEAD    90+    SysAdmin_VstLnk        Allergies    No Known Allergies    Intolerances        Vital Signs Last 24 Hrs  T(C): 36.9 (21 Oct 2024 08:11), Max: 37 (20 Oct 2024 22:50)  T(F): 98.4 (21 Oct 2024 08:11), Max: 98.6 (20 Oct 2024 22:50)  HR: 68 (21 Oct 2024 08:11) (60 - 88)  BP: 127/61 (21 Oct 2024 08:11) (122/57 - 152/63)  BP(mean): --  RR: 18 (21 Oct 2024 08:11) (18 - 18)  SpO2: 92% (21 Oct 2024 08:11) (92% - 98%)      amLODIPine   Tablet 10 milliGRAM(s) Oral daily  chlorhexidine 2% Cloths 1 Application(s) Topical <User Schedule>  dextrose 50% Injectable 25 milliLiter(s) IV Push every 15 minutes  dextrose 50% Injectable 50 milliLiter(s) IV Push every 15 minutes  dextrose 50% Injectable 25 milliLiter(s) IV Push every 15 minutes  heparin   Injectable 5000 Unit(s) SubCutaneous every 8 hours  insulin lispro (ADMELOG) corrective regimen sliding scale   SubCutaneous Before meals and at bedtime  labetalol 200 milliGRAM(s) Oral every 8 hours  levETIRAcetam 500 milliGRAM(s) Oral two times a day  mupirocin 2% Ointment 1 Application(s) Topical every 12 hours  pantoprazole    Tablet 40 milliGRAM(s) Oral before breakfast  piperacillin/tazobactam IVPB.. 3.375 Gram(s) IV Intermittent every 8 hours  polyethylene glycol 3350 17 Gram(s) Oral daily  pyridoxine 100 milliGRAM(s) Oral daily  senna 2 Tablet(s) Oral at bedtime  traMADol 25 milliGRAM(s) Oral every 6 hours PRN  traMADol 50 milliGRAM(s) Oral every 6 hours PRN        10-20-24 @ 07:01  -  10-21-24 @ 07:00  --------------------------------------------------------  IN: 0 mL / OUT: 450 mL / NET: -450 mL    10-21-24 @ 07:01  -  10-21-24 @ 15:14  --------------------------------------------------------  IN: 200 mL / OUT: 0 mL / NET: 200 mL        REVIEW OF SYSTEMS    [x ] A ten-point review of systems was otherwise negative except as noted.  [ ] Due to altered mental status/intubation, subjective information were not able to be obtained from the patient. History was obtained, to the extent possible, from review of the chart and collateral sources of information.      Exam:  AAOX2 (person, place)  Uncooperative to follow some commands  Tracks  Motor: MAEx4- good strength   Sensation: intact to touch in all extremities  Wound: Dressing c/d/i improving bogginess under incision      CBC Full  -  ( 21 Oct 2024 06:23 )  WBC Count : 13.01 K/uL  RBC Count : 2.34 M/uL  Hemoglobin : 7.0 g/dL  Hematocrit : 22.1 %  Platelet Count - Automated : 243 K/uL  Mean Cell Volume : 94.4 fL  Mean Cell Hemoglobin : 29.9 pg  Mean Cell Hemoglobin Concentration : 31.7 g/dL  Auto Neutrophil # : 9.59 K/uL  Auto Lymphocyte # : 1.09 K/uL  Auto Monocyte # : 1.62 K/uL  Auto Eosinophil # : 0.17 K/uL  Auto Basophil # : 0.03 K/uL  Auto Neutrophil % : 73.7 %  Auto Lymphocyte % : 8.4 %  Auto Monocyte % : 12.5 %  Auto Eosinophil % : 1.3 %  Auto Basophil % : 0.2 %    10-21    143  |  109  |  32[H]  ----------------------------<  97  3.3[L]   |  24  |  1.3    Ca    8.7      21 Oct 2024 06:23  Mg     2.2     10-21          Assessment/Plan:   - Pain control  - PT/OT/Rehab  - Care per primary team  - Recall Neurosurgery prn #[id number]  - Will d/c sutures 10/30/24  - D/W attending

## 2024-10-21 NOTE — PROGRESS NOTE ADULT - ASSESSMENT
79F w/ PMHx of MDS (follows w/ Dr Wade, requires periodic transfusions for anemia), DM, HTN, CKD BIBEMS after being found unresponsive on the floor. CTH in the ED with R hemispheric SDH 2.2cm w/ mass effect and 1.1cm MLS s/p crani 10/9 and MMA embolization. Patient was admitted to NCCU. Was eventually extubated and had some stridor, for which she completed steroids. On 10/16, hospital course was complicated by leaking of fluid (?seroma) from crani site for which neurosurgery is following. On 10/17, hospital course further complicated by urinary retention, patient later able to void, and blood loss chronic anemia s/p 1u PRNC on 10/16.    #R SDH due to fall s/p Crani 10/8  #S/p MMA embolization 10/9  - intubated in ED for airway protection  - now comfortable on room air  - CTH stable  - patient noted to have an extracalvarial fluid collection s/p aspiration by neurosurgery, cultures NGTD  - NRSRGY: cw current mgmt, contact if csf leak  - diet per s/s  - f/u PT/OT  - c/w neurochecks   - currently on keppra    #UTI  #Hematuria  -Patient was found to have blood in her primafit  -UA was obtained however could be a dirty catch  -Will obtain urine culture from straight catheter  -Bladder scan      #HTN with hypertensive urgency on admission  - s/p nicardipine GTT  - BP controlled today  - cw Labetalol 200 q8H and amlodipine 10 mg daily   - patient on lisinopril at home, now being held due to hyperkalemia   - echo 10/9 - limited study, EF65-70%  - hold lasix for now  - repeat CXR in am    #MDS  - follows with Dr. Wade  - s/p 1 unit prbc  10/16/24  - monitor CBC, transfuse if Hb <7  - Hgb trended down to 7.0 will give 1 unit pRBC  - maintain active type & screen; [id number]    #MARCO on CKD III  - resolved, had transient urinary retention, patient able to void  - c/w bladder scans q6H  - holding bicarb 650 Q8H (home dose)      General:  - DVT proph- heparin  - GI proph- pantoprazole  - Diet- east to chew  - Activity- out of bed to chair  - Code Status- full code    In progress:  monitor crani site, c/w neurochecks, urine culture, vit b6, start zosyn,    79F w/ PMHx of MDS (follows w/ Dr Wade, requires periodic transfusions for anemia), DM, HTN, CKD BIBEMS after being found unresponsive on the floor. CTH in the ED with R hemispheric SDH 2.2cm w/ mass effect and 1.1cm MLS s/p crani 10/9 and MMA embolization. Patient was admitted to NCCU. Was eventually extubated and had some stridor, for which she completed steroids. On 10/16, hospital course was complicated by leaking of fluid (?seroma) from crani site for which neurosurgery is following. On 10/17, hospital course further complicated by urinary retention, patient later able to void, and blood loss chronic anemia s/p 1u PRNC on 10/16.    #R SDH due to fall s/p Crani 10/8  #S/p MMA embolization 10/9  - intubated in ED for airway protection  - now comfortable on room air  - CTH stable  - patient noted to have an extracalvarial fluid collection s/p aspiration by neurosurgery, cultures NGTD  - NRSRGY: cw current mgmt, contact if csf leak  - diet per s/s  - f/u PT/OT pending  - c/w neurochecks   - currently on keppra  - started vit b6     #UTI  #Hematuria  -Patient was found to have blood in her primafit  -UA was obtained however could be a dirty catch  -Will obtain urine culture from straight catheter  -Bladder scan      #HTN with hypertensive urgency on admission  - s/p nicardipine GTT  - BP controlled today  - cw Labetalol 200 q8H and amlodipine 10 mg daily   - patient on lisinopril at home, now being held due to hyperkalemia   - echo 10/9 - limited study, EF65-70%  - hold lasix for now  - BNP for AM    #MDS  - follows with Dr. Wade  - s/p 1 unit prbc  10/16/24  - monitor CBC, transfuse if Hb <7  - Hgb trended down to 7.0 will give 1 unit pRBC  - maintain active type & screen; [id number]/21    #MARCO on CKD III  - resolved, had transient urinary retention, patient able to void  - c/w bladder scans q6H  - holding bicarb 650 Q8H (home dose)      General:  - DVT proph- heparin  - GI proph- pantoprazole  - Diet- east to chew  - Activity- out of bed to chair  - Code Status- full code    In progress:  monitor crani site, c/w neurochecks, urine culture, vit b6, start zosyn, morning BNP

## 2024-10-21 NOTE — PROGRESS NOTE ADULT - SUBJECTIVE AND OBJECTIVE BOX
SIMONA KUHN  79y  [facility]-[address]      Patient is a 79y old  Female who presents with a chief complaint of Traumatic subdural hemorrhage (20 Oct 2024 00:31)      INTERVAL HPI/OVERNIGHT EVENTS:        REVIEW OF SYSTEMS:        FAMILY HISTORY:    T(C): 36.9 (10-21-24 @ 08:11), Max: 37 (10-20-24 @ 22:50)  HR: 68 (10-21-24 @ 08:11) (60 - 88)  BP: 127/61 (10-21-24 @ 08:11) (122/57 - 152/63)  RR: 18 (10-21-24 @ 08:11) (18 - 18)  SpO2: 92% (10-21-24 @ 08:11) (92% - 98%)  Wt(kg): --Vital Signs Last 24 Hrs  T(C): 36.9 (21 Oct 2024 08:11), Max: 37 (20 Oct 2024 22:50)  T(F): 98.4 (21 Oct 2024 08:11), Max: 98.6 (20 Oct 2024 22:50)  HR: 68 (21 Oct 2024 08:11) (60 - 88)  BP: 127/61 (21 Oct 2024 08:11) (122/57 - 152/63)  BP(mean): --  RR: 18 (21 Oct 2024 08:11) (18 - 18)  SpO2: 92% (21 Oct 2024 08:11) (92% - 98%)    Parameters below as of 20 Oct 2024 11:51  Patient On (Oxygen Delivery Method): room air        PHYSICAL EXAM:  GENERAL: NAD, well-groomed, well-developed  HEAD:  Atraumatic, Normocephalic  EYES: EOMI, PERRLA, conjunctiva and sclera clear  ENMT: No tonsillar erythema, exudates, or enlargement; Moist mucous membranes, Good dentition, No lesions  NECK: Supple, No JVD, Normal thyroid  NERVOUS SYSTEM:  Alert & Oriented X3, Good concentration; Motor Strength 5/5 B/L upper and lower extremities; DTRs 2+ intact and symmetric  PULM: Clear to auscultation bilaterally  CARDIAC: Regular rate and rhythm; No murmurs, rubs, or gallops  GI: Soft, Nontender, Nondistended; Bowel sounds present  EXTREMITIES:  2+ Peripheral Pulses, No clubbing, cyanosis, or edema  LYMPH: No lymphadenopathy noted  SKIN: No rashes or lesions    Consultant(s) Notes Reviewed:  [x ] YES  [ ] NO  Care Discussed with Consultants/Other Providers [ x] YES  [ ] NO    LABS:                            7.0    13.01 )-----------( 243      ( 21 Oct 2024 06:23 )             22.1   10-21    143  |  109  |  32[H]  ----------------------------<  97  3.3[L]   |  24  |  1.3    Ca    8.7      21 Oct 2024 06:23  Mg     2.2     10-21              amLODIPine   Tablet 10 milliGRAM(s) Oral daily  chlorhexidine 2% Cloths 1 Application(s) Topical <User Schedule>  dextrose 50% Injectable 50 milliLiter(s) IV Push every 15 minutes  dextrose 50% Injectable 25 milliLiter(s) IV Push every 15 minutes  dextrose 50% Injectable 25 milliLiter(s) IV Push every 15 minutes  heparin   Injectable 5000 Unit(s) SubCutaneous every 8 hours  insulin lispro (ADMELOG) corrective regimen sliding scale   SubCutaneous Before meals and at bedtime  labetalol 200 milliGRAM(s) Oral every 8 hours  levETIRAcetam 500 milliGRAM(s) Oral two times a day  mupirocin 2% Ointment 1 Application(s) Topical every 12 hours  polyethylene glycol 3350 17 Gram(s) Oral daily  senna 2 Tablet(s) Oral at bedtime  traMADol 25 milliGRAM(s) Oral every 6 hours PRN  traMADol 50 milliGRAM(s) Oral every 6 hours PRN      79F w/ PMHx of MDS (follows w/ Dr Wade, requires periodic transfusions for anemia), DM, HTN, CKD BIBEMS after being found unresponsive on the floor. CTH in the ED with R SDH s/p crani 10/9 and MMA embolization. Patient was admitted to NCCU. Was eventually extubated and had some stridor, for which she completed steroids. Hospital course complicated by leaking of fluid (?seroma) from crani site for which neurosurgery is following. Hospital course further complicate by urinary retention, patient later able to void, and blood loss anemia s/p 1u PRNC on 10/16, patient now downgraded to SDU     1. Fall/Syncope secondary to Right subdural hematoma due to fall s/p Crani 10/8 followed by  MMA embolization 10/9 (for blood leak postop)   - intubated in ED for airway protection  now comfortable on room air  - serial CTHs stable, discussed with Dr Ortiz  - patient noted to have an extracalvarial fluid collection s/p aspiration by neurosurgery,   - Cultures NGTD  - c/w neurochecks q8h  - diet per s/s  - Cont keppra 500mg po bid        - Add vit b6 100 mg po daily   - Neurosx consult appreciated:  a) contact them if pt observed leaking fluid from crani site   - PT/OT:pending    2. HTN with hypertensive urgency on admission  - CXR: left sided effusion. BNP:pending  - s/p nicadipine GTT  - currently BP acceptable, continue with Labetalol 200 q8H and amlodipine 10 mg daily   - Hold lisinopril due to hyperkalemia   - hold lasix for now   - echo 10/9 - limited study, EF65-70%    3. MARCO on CKD III resolved   - resolved, had transient urinary retention, patient able to void  - c/w bladder scans q6H  - US abdomen:WNL   - holding bicarb 650 Q8H (home dose) CO2>22     4. Anemia mainly due to H/o MDS s/p 2 PRBC   - follows with Dr Wade  - monitor CBC, transfuse if Hb <7  - s/p 1 unit prbc  10/16/24. Give another unit 10/21/2024    5. Possible acute UTI associated with hematuria    - Started on Zosyn d:1   - urine cx:pending     6. DVT/GI px   - PPI daily    SIMONA KUHN  79y  [facility] [address]      Patient is a 79y old  Female who presents with a chief complaint of Traumatic subdural hemorrhage (20 Oct 2024 00:31)      INTERVAL HPI/OVERNIGHT EVENTS:    Patient feels well but deconditioned   has no complains   denies abdominal pain, burning pain   no overnight events.   hb low but no active bleeding       FAMILY HISTORY:    T(C): 36.9 (10-21-24 @ 08:11), Max: 37 (10-20-24 @ 22:50)  HR: 68 (10-21-24 @ 08:11) (60 - 88)  BP: 127/61 (10-21-24 @ 08:11) (122/57 - 152/63)  RR: 18 (10-21-24 @ 08:11) (18 - 18)  SpO2: 92% (10-21-24 @ 08:11) (92% - 98%)  Wt(kg): --Vital Signs Last 24 Hrs  T(C): 36.9 (21 Oct 2024 08:11), Max: 37 (20 Oct 2024 22:50)  T(F): 98.4 (21 Oct 2024 08:11), Max: 98.6 (20 Oct 2024 22:50)  HR: 68 (21 Oct 2024 08:11) (60 - 88)  BP: 127/61 (21 Oct 2024 08:11) (122/57 - 152/63)  BP(mean): --  RR: 18 (21 Oct 2024 08:11) (18 - 18)  SpO2: 92% (21 Oct 2024 08:11) (92% - 98%)    Parameters below as of 20 Oct 2024 11:51  Patient On (Oxygen Delivery Method): room air        PHYSICAL EXAM:  GENERAL: Looks comfortable but looks deconditioned   NERVOUS SYSTEM:  Alert & Oriented X3, Moving all extremeties but diffuse weakness noted   PULM: Clear to auscultation bilaterally  CARDIAC: Regular rate and rhythm; No murmurs, rubs, or gallops  GI: Soft, Nontender, Nondistended; Bowel sounds present  EXTREMITIES:  2+ Peripheral Pulses,     Consultant(s) Notes Reviewed:  [x ] YES  [ ] NO  Care Discussed with Consultants/Other Providers [ x] YES  [ ] NO    LABS:                            7.0    13.01 )-----------( 243      ( 21 Oct 2024 06:23 )             22.1   10-21    143  |  109  |  32[H]  ----------------------------<  97  3.3[L]   |  24  |  1.3    Ca    8.7      21 Oct 2024 06:23  Mg     2.2     10-21              amLODIPine   Tablet 10 milliGRAM(s) Oral daily  chlorhexidine 2% Cloths 1 Application(s) Topical <User Schedule>  dextrose 50% Injectable 50 milliLiter(s) IV Push every 15 minutes  dextrose 50% Injectable 25 milliLiter(s) IV Push every 15 minutes  dextrose 50% Injectable 25 milliLiter(s) IV Push every 15 minutes  heparin   Injectable 5000 Unit(s) SubCutaneous every 8 hours  insulin lispro (ADMELOG) corrective regimen sliding scale   SubCutaneous Before meals and at bedtime  labetalol 200 milliGRAM(s) Oral every 8 hours  levETIRAcetam 500 milliGRAM(s) Oral two times a day  mupirocin 2% Ointment 1 Application(s) Topical every 12 hours  polyethylene glycol 3350 17 Gram(s) Oral daily  senna 2 Tablet(s) Oral at bedtime  traMADol 25 milliGRAM(s) Oral every 6 hours PRN  traMADol 50 milliGRAM(s) Oral every 6 hours PRN      79F w/ PMHx of MDS (follows w/ Dr Wade, requires periodic transfusions for anemia), DM, HTN, CKD BIBEMS after being found unresponsive on the floor. CTH in the ED with R SDH s/p crani 10/9 and MMA embolization. Patient was admitted to NCCU. Was eventually extubated and had some stridor, for which she completed steroids. Hospital course complicated by leaking of fluid (?seroma) from crani site for which neurosurgery is following. Hospital course further complicate by urinary retention, patient later able to void, and blood loss anemia s/p 1u PRNC on 10/16, patient now downgraded to SDU     1. Fall/Syncope secondary to Right subdural hematoma due to fall s/p Crani 10/8 followed by  MMA embolization 10/9 (for blood leak postop)   - intubated in ED for airway protection  now comfortable on room air  - serial CTHs stable, discussed with Dr Ortiz  - patient noted to have an extracalvarial fluid collection s/p aspiration by neurosurgery,   - Cultures NGTD  - c/w neurochecks q8h  - diet per s/s  - Cont keppra 500mg po bid        - Add vit b6 100 mg po daily   - Neurosx consult appreciated:  a) contact them if pt observed leaking fluid from crani site   - PT/OT:pending    2. HTN with hypertensive urgency on admission  - CXR: left sided effusion. BNP:pending  - s/p nicadipine GTT  - currently BP acceptable, continue with Labetalol 200 q8H and amlodipine 10 mg daily   - Hold lisinopril due to hyperkalemia   - hold lasix for now   - echo 10/9 - limited study, EF65-70%    3. MARCO on CKD III resolved   - resolved, had transient urinary retention, patient able to void  - c/w bladder scans q6H  - US abdomen:WNL   - holding bicarb 650 Q8H (home dose) CO2>22     4. Anemia mainly due to H/o MDS s/p 2 PRBC   * follows with Dr Wade  - No bleeding noted. Watch for melena   - monitor CBC, transfuse if Hb <7. Repeat CBC in the afternoon   - s/p 1 unit prbc  10/16/24. Give another unit 10/21/2024    5. Possible acute UTI associated with hematuria    - Started on Zosyn d:1   - urine cx:pending     6. DVT/GI px   - PPI daily

## 2024-10-21 NOTE — PROGRESS NOTE ADULT - THIS PATIENT HAS THE FOLLOWING CONDITION(S)/DIAGNOSES ON THIS ADMISSION:
Brain Compression / Herniation
None
SDH
Brain Compression / Herniation
None
None
SDH
Cerebral Edema/Brain Compression / Herniation/Acute Respiratory Failure
SDH
SDH

## 2024-10-22 LAB
ANION GAP SERPL CALC-SCNC: 9 MMOL/L — SIGNIFICANT CHANGE UP (ref 7–14)
BUN SERPL-MCNC: 29 MG/DL — HIGH (ref 10–20)
CALCIUM SERPL-MCNC: 8.7 MG/DL — SIGNIFICANT CHANGE UP (ref 8.4–10.5)
CHLORIDE SERPL-SCNC: 111 MMOL/L — HIGH (ref 98–110)
CO2 SERPL-SCNC: 23 MMOL/L — SIGNIFICANT CHANGE UP (ref 17–32)
CREAT SERPL-MCNC: 1.4 MG/DL — SIGNIFICANT CHANGE UP (ref 0.7–1.5)
CULTURE RESULTS: SIGNIFICANT CHANGE UP
EGFR: 39 ML/MIN/1.73M2 — LOW
GLUCOSE BLDC GLUCOMTR-MCNC: 123 MG/DL — HIGH (ref 70–99)
GLUCOSE BLDC GLUCOMTR-MCNC: 132 MG/DL — HIGH (ref 70–99)
GLUCOSE BLDC GLUCOMTR-MCNC: 134 MG/DL — HIGH (ref 70–99)
GLUCOSE BLDC GLUCOMTR-MCNC: 217 MG/DL — HIGH (ref 70–99)
GLUCOSE BLDC GLUCOMTR-MCNC: 235 MG/DL — HIGH (ref 70–99)
GLUCOSE SERPL-MCNC: 120 MG/DL — HIGH (ref 70–99)
HCT VFR BLD CALC: 25.9 % — LOW (ref 37–47)
HGB BLD-MCNC: 8.7 G/DL — LOW (ref 12–16)
MCHC RBC-ENTMCNC: 30.5 PG — SIGNIFICANT CHANGE UP (ref 27–31)
MCHC RBC-ENTMCNC: 33.6 G/DL — SIGNIFICANT CHANGE UP (ref 32–37)
MCV RBC AUTO: 90.9 FL — SIGNIFICANT CHANGE UP (ref 81–99)
NRBC # BLD: 0 /100 WBCS — SIGNIFICANT CHANGE UP (ref 0–0)
NT-PROBNP SERPL-SCNC: 1439 PG/ML — HIGH (ref 0–300)
PLATELET # BLD AUTO: 251 K/UL — SIGNIFICANT CHANGE UP (ref 130–400)
PMV BLD: 11 FL — HIGH (ref 7.4–10.4)
POTASSIUM SERPL-MCNC: 3.3 MMOL/L — LOW (ref 3.5–5)
POTASSIUM SERPL-SCNC: 3.3 MMOL/L — LOW (ref 3.5–5)
RBC # BLD: 2.85 M/UL — LOW (ref 4.2–5.4)
RBC # FLD: 15.9 % — HIGH (ref 11.5–14.5)
SODIUM SERPL-SCNC: 143 MMOL/L — SIGNIFICANT CHANGE UP (ref 135–146)
SPECIMEN SOURCE: SIGNIFICANT CHANGE UP
WBC # BLD: 13.86 K/UL — HIGH (ref 4.8–10.8)
WBC # FLD AUTO: 13.86 K/UL — HIGH (ref 4.8–10.8)

## 2024-10-22 PROCEDURE — 71045 X-RAY EXAM CHEST 1 VIEW: CPT | Mod: 26

## 2024-10-22 PROCEDURE — 99232 SBSQ HOSP IP/OBS MODERATE 35: CPT

## 2024-10-22 RX ORDER — POTASSIUM CHLORIDE 10 MEQ
40 TABLET, EXTENDED RELEASE ORAL ONCE
Refills: 0 | Status: COMPLETED | OUTPATIENT
Start: 2024-10-22 | End: 2024-10-22

## 2024-10-22 RX ADMIN — POLYETHYLENE GLYCOL 3350 17 GRAM(S): 17 POWDER, FOR SOLUTION ORAL at 11:11

## 2024-10-22 RX ADMIN — LEVETIRACETAM 500 MILLIGRAM(S): 500 TABLET, FILM COATED ORAL at 05:29

## 2024-10-22 RX ADMIN — HEPARIN SODIUM 5000 UNIT(S): 10000 INJECTION INTRAVENOUS; SUBCUTANEOUS at 05:29

## 2024-10-22 RX ADMIN — LEVETIRACETAM 500 MILLIGRAM(S): 500 TABLET, FILM COATED ORAL at 17:30

## 2024-10-22 RX ADMIN — MUPIROCIN 1 APPLICATION(S): 20 OINTMENT TOPICAL at 05:39

## 2024-10-22 RX ADMIN — PANTOPRAZOLE SODIUM 40 MILLIGRAM(S): 40 TABLET, DELAYED RELEASE ORAL at 05:30

## 2024-10-22 RX ADMIN — Medication 4: at 11:43

## 2024-10-22 RX ADMIN — Medication 200 MILLIGRAM(S): at 05:29

## 2024-10-22 RX ADMIN — PIPERACILLIN AND TAZOBACTAM 25 GRAM(S): .5; 4 INJECTION, POWDER, LYOPHILIZED, FOR SOLUTION INTRAVENOUS at 13:22

## 2024-10-22 RX ADMIN — HEPARIN SODIUM 5000 UNIT(S): 10000 INJECTION INTRAVENOUS; SUBCUTANEOUS at 13:22

## 2024-10-22 RX ADMIN — Medication 2 TABLET(S): at 21:03

## 2024-10-22 RX ADMIN — Medication 200 MILLIGRAM(S): at 21:06

## 2024-10-22 RX ADMIN — Medication 4: at 21:09

## 2024-10-22 RX ADMIN — PIPERACILLIN AND TAZOBACTAM 25 GRAM(S): .5; 4 INJECTION, POWDER, LYOPHILIZED, FOR SOLUTION INTRAVENOUS at 05:33

## 2024-10-22 RX ADMIN — PIPERACILLIN AND TAZOBACTAM 25 GRAM(S): .5; 4 INJECTION, POWDER, LYOPHILIZED, FOR SOLUTION INTRAVENOUS at 21:02

## 2024-10-22 RX ADMIN — Medication 100 MILLIGRAM(S): at 11:10

## 2024-10-22 RX ADMIN — Medication 10 MILLIGRAM(S): at 05:29

## 2024-10-22 RX ADMIN — Medication 40 MILLIEQUIVALENT(S): at 11:08

## 2024-10-22 RX ADMIN — CHLORHEXIDINE GLUCONATE 1 APPLICATION(S): 40 SOLUTION TOPICAL at 05:33

## 2024-10-22 RX ADMIN — HEPARIN SODIUM 5000 UNIT(S): 10000 INJECTION INTRAVENOUS; SUBCUTANEOUS at 21:03

## 2024-10-22 RX ADMIN — Medication 200 MILLIGRAM(S): at 13:25

## 2024-10-22 NOTE — PROGRESS NOTE ADULT - ASSESSMENT
79F w/ PMHx of MDS (follows w/ Dr Wade, requires periodic transfusions for anemia), DM, HTN, CKD BIBEMS after being found unresponsive on the floor. CTH in the ED with R hemispheric SDH 2.2cm w/ mass effect and 1.1cm MLS s/p crani 10/9 and MMA embolization. Patient was admitted to NCCU. Was eventually extubated and had some stridor, for which she completed steroids. On 10/16, hospital course was complicated by leaking of fluid (?seroma) from crani site for which neurosurgery is following. On 10/17, hospital course further complicated by urinary retention, patient later able to void, and blood loss chronic anemia s/p 1u PRNC on 10/16.    #R SDH due to fall s/p Crani 10/8  #S/p MMA embolization 10/9  - intubated in ED for airway protection  - now comfortable on room air  - CTH stable  - patient noted to have an extracalvarial fluid collection s/p aspiration by neurosurgery, cultures NGTD  - NRSRGY: cw current mgmt, contact if csf leak  - diet per s/s  -PT/OT recommends rehab  - c/w neurochecks   - currently on keppra  - cw vit b6     #UTI  #Hematuria  -Patient was found to have blood in her primafit  -UA was obtained however could be a dirty catch  -F/u urine cx      #HTN with hypertensive urgency on admission  - s/p nicardipine GTT  - BP controlled today  - cw Labetalol 200 q8H and amlodipine 10 mg daily   - patient on lisinopril at home, now being held due to hyperkalemia   - echo 10/9 - limited study, EF65-70%  - hold lasix for now  - BNP 1439    #MDS  - follows with Dr. Wade  - s/p 1 unit prbc  10/16/24  - monitor CBC, transfuse if Hb <7  - Hgb stable s/p 1 unite  - maintain active type & screen; latest 10/21    #MARCO on CKD III  - resolved, had transient urinary retention, patient able to void  - c/w bladder scans q6H  - holding bicarb 650 Q8H (home dose)      General:  - DVT proph- heparin  - GI proph- pantoprazole  - Diet- east to chew  - Activity- out of bed to chair  - Code Status- full code    In progress:  monitor crani site, c/w neurochecks, urine culture, cw zosyn

## 2024-10-22 NOTE — CHART NOTE - NSCHARTNOTEFT_GEN_A_CORE
Red rubber string removed from the ostomy. Fully functioning ostomy, healing well.      Recall surgery as needed Trauma tertiary Survey      Patient seen and examined. No complaints at this time.     PHYSICAL EXAM:  Craniofacial: Multiple staples noted over cranium. healing well. no oozing, no erythema, no tenderness  Eyes: Pupil Size equal B/L and RTL. EOMI  Oropharynx: Atraumatic  Neck: Non-tender, No deformity, Trachea midline.  Chest: Equal breath sounds, non-tender, No deformity or crepitus  Heart: NSR.   Abdomen: Atraumatic, Non-tender, non-distended. No hepatosplenomegaly  Pelvis: Stable, non-tender, no deformity  : Atraumatic, no blood at the meatus or priapism  Back: Spine non-tender, Atraumatic  Extremities: Atraumatic, no deformities, normal pulses, moving all extremities   Neurologic: CN intact, Motor intact throughout. Sensation intact/normal throughout.  Psych: Mood and affect normal. Slightly sleepy. Judgment and insight normal          All images/reports reviewed. No further traumatic work-up warranted.

## 2024-10-22 NOTE — CHART NOTE - NSCHARTNOTEFT_GEN_A_CORE
Registered Dietitian Follow-Up / consult for Poor PO intake      Patient Profile Reviewed                           Yes [x]   No []     Nutrition History Previously Obtained        Yes []  No [x]       Pertinent Subjective Information: Hospital day 13. RD unable to obtian nutrition information as pt was asleep and would not arouse to verbal cue. RD observed untouched lunch with straw in Ensure MAX (likely from breakfast). oral nutrition supplement was ~75% full.  Per previous follow up on 10/16. pt was tolerating diet texture/consistency. Bedscale 71.8 kg      Pertinent Medical Interventions:  79F w/ PMHx of MDS (follows w/ Dr Wade, requires periodic transfusions for anemia), DM, HTN, CKD BIBEMS after being found unresponsive on the floor.      Diet order: Diet, Easy to Chew:   Consistent Carbohydrate {No Snacks} (CSTCHO)  DASH/TLC {Sodium & Cholesterol Restricted} (DASH)  Supplement Feeding Modality:  Oral  Ensure Max Cans or Servings Per Day:  1       Frequency:  Three Times a day (10-20- @ 12:45) [Active]    Anthropometrics:  Wt: 71.8 kg  ht. 162.6 cm   IBW: 55 kg   UBW: PERFECTO    Daily Weight in k (10-17)Daily weight in k.3 (10-16), Weight in k.2 (10-15), Weight in k.1 (10-14), Weight in k.8 (10-13), Weight in k (10-12), Weight in k.3 (10-11), Weight in k.8 (10-10)   % Weight Change: wt fluctuations likely due to fluid shifts.      MEDICATIONS  (STANDING):  amLODIPine   Tablet 10 milliGRAM(s) Oral daily  chlorhexidine 2% Cloths 1 Application(s) Topical <User Schedule>  dextrose 50% Injectable 50 milliLiter(s) IV Push every 15 minutes  dextrose 50% Injectable 25 milliLiter(s) IV Push every 15 minutes  dextrose 50% Injectable 25 milliLiter(s) IV Push every 15 minutes  heparin   Injectable 5000 Unit(s) SubCutaneous every 8 hours  insulin lispro (ADMELOG) corrective regimen sliding scale   SubCutaneous Before meals and at bedtime  labetalol 200 milliGRAM(s) Oral every 8 hours  levETIRAcetam 500 milliGRAM(s) Oral two times a day  pantoprazole    Tablet 40 milliGRAM(s) Oral before breakfast  piperacillin/tazobactam IVPB.. 3.375 Gram(s) IV Intermittent every 8 hours  polyethylene glycol 3350 17 Gram(s) Oral daily  pyridoxine 100 milliGRAM(s) Oral daily  senna 2 Tablet(s) Oral at bedtime       Pertinent Labs: 10-22 @ 07:07: Na 143, BUN 29[H], Cr 1.4, [H], K+ 3.3[L], Phos 3.2 (10/18), Mg 2.2 (10/21), HbA1c 6.0% (10/9)    Finger Sticks:  POCT Blood Glucose.: 132 mg/dL (10-22 @ 15:53)  POCT Blood Glucose.: 134 mg/dL (10-22 @ 14:05)  POCT Blood Glucose.: 217 mg/dL (10-22 @ 11:30)  POCT Blood Glucose.: 123 mg/dL (10-22 @ 08:03)  POCT Blood Glucose.: 135 mg/dL (10-21 @ 20:59)  POCT Blood Glucose.: 202 mg/dL (10-21 @ 16:54)    Physical Findings:  - Appearance: Lethargic, AAOx3 per flowsheet on RA  - GI function: Last BM 10/21   - Tubes:  n/a  - Oral/Mouth cavity: Easy to chew with 1:1 assistance  - Skin: surgical incision (head) with blisters on L/R forearm  - Edema: +2 generalized edema per flowsheet     Nutrition Requirements: Estimated energy and protein needs with consideration for weight maintenance BMI, age, mobility, craniotomy site healing and comorbidities.   Weight Used: 71.8 kg 10/22 bedscale     Estimated Energy Needs    Continue []  Adjust [x]  Adjusted Energy Recommendations:  4497-9200 kcal/day     Estimated Protein Needs    Continue []  Adjust [x]  Adjusted Protein Recommendations:  1.2-1.4 g pro/kg = 86 - 100 gm protein /day      Estimated Fluid Needs        Continue []  Adjust [x]  Adjusted Fluid Recommendations:   1 mL/kcal / day or per md recommendations     Nutrient Intake: Poor po intake    [x] Previous Nutrition Diagnosis:  Inadequate PO intake   related to AMS   as evidenced by <50% PO intake              [x] Ongoing          [] Resolved    [] No active nutrition diagnosis identified at this time     Nutrition Education: deferred d/t cognitive status     Goal/Expected Outcome: Pt to consume 50-75% of meals / supplements in 3-5 days     Indicator/Monitoring:  Anthroprometrics, GI S/S, -Lytes (Phos, Mag, K+), BM, I/Os, Labs, NFPF, GI fxn. Energy intake and tolerance.     Recommendation:   1. Liberalize Diet order to encourage PO intake and introduce appetite stimulant if medically feasible.   2. Consider switching to Glucerna supplement to increase Kcal intake   3. If poor PO intake continues, consider EN feeds via NG tube using Glucerna 1.2 kcal if GOC permit. Estimated needs. 300 mL q4hr of Glucerna 1.2 kcal to provide 1800 mL , 2160 kcal, 106 grams pro, 1450 mL of free water. Free water flushes of 50cc q4h pre post feeds or prn to provide total free water of 600mL from flushes + 1450mL from TF = 2050mL total free water /day.     Mandi Dennis x [phone] or Via TEAMS    high Risk Follow UP in 3-5 days or PRN. for poor PO intake.

## 2024-10-22 NOTE — PROGRESS NOTE ADULT - SUBJECTIVE AND OBJECTIVE BOX
SIMONA KUHN  79y  [facility] [address]      Patient is a 79y old  Female who presents with a chief complaint of Traumatic subdural hemorrhage (20 Oct 2024 00:31)      INTERVAL HPI/OVERNIGHT EVENTS:    Patient feels well but deconditioned   has no complains   denies abdominal pain, burning pain   no overnight events.   hb low but no active bleeding       FAMILY HISTORY:    T(C): 36.9 (10-21-24 @ 08:11), Max: 37 (10-20-24 @ 22:50)  HR: 68 (10-21-24 @ 08:11) (60 - 88)  BP: 127/61 (10-21-24 @ 08:11) (122/57 - 152/63)  RR: 18 (10-21-24 @ 08:11) (18 - 18)  SpO2: 92% (10-21-24 @ 08:11) (92% - 98%)  Wt(kg): --Vital Signs Last 24 Hrs  T(C): 36.9 (21 Oct 2024 08:11), Max: 37 (20 Oct 2024 22:50)  T(F): 98.4 (21 Oct 2024 08:11), Max: 98.6 (20 Oct 2024 22:50)  HR: 68 (21 Oct 2024 08:11) (60 - 88)  BP: 127/61 (21 Oct 2024 08:11) (122/57 - 152/63)  BP(mean): --  RR: 18 (21 Oct 2024 08:11) (18 - 18)  SpO2: 92% (21 Oct 2024 08:11) (92% - 98%)    Parameters below as of 20 Oct 2024 11:51  Patient On (Oxygen Delivery Method): room air        PHYSICAL EXAM:  GENERAL: Looks comfortable but looks deconditioned   NERVOUS SYSTEM:  Alert & Oriented X3, Moving all extremeties but diffuse weakness noted   PULM: Clear to auscultation bilaterally  CARDIAC: Regular rate and rhythm; No murmurs, rubs, or gallops  GI: Soft, Nontender, Nondistended; Bowel sounds present  EXTREMITIES:  2+ Peripheral Pulses,     Consultant(s) Notes Reviewed:  [x ] YES  [ ] NO  Care Discussed with Consultants/Other Providers [ x] YES  [ ] NO    LABS:                            7.0    13.01 )-----------( 243      ( 21 Oct 2024 06:23 )             22.1   10-21    143  |  109  |  32[H]  ----------------------------<  97  3.3[L]   |  24  |  1.3    Ca    8.7      21 Oct 2024 06:23  Mg     2.2     10-21              amLODIPine   Tablet 10 milliGRAM(s) Oral daily  chlorhexidine 2% Cloths 1 Application(s) Topical <User Schedule>  dextrose 50% Injectable 50 milliLiter(s) IV Push every 15 minutes  dextrose 50% Injectable 25 milliLiter(s) IV Push every 15 minutes  dextrose 50% Injectable 25 milliLiter(s) IV Push every 15 minutes  heparin   Injectable 5000 Unit(s) SubCutaneous every 8 hours  insulin lispro (ADMELOG) corrective regimen sliding scale   SubCutaneous Before meals and at bedtime  labetalol 200 milliGRAM(s) Oral every 8 hours  levETIRAcetam 500 milliGRAM(s) Oral two times a day  mupirocin 2% Ointment 1 Application(s) Topical every 12 hours  polyethylene glycol 3350 17 Gram(s) Oral daily  senna 2 Tablet(s) Oral at bedtime  traMADol 25 milliGRAM(s) Oral every 6 hours PRN  traMADol 50 milliGRAM(s) Oral every 6 hours PRN      79F w/ PMHx of MDS (follows w/ Dr Wade, requires periodic transfusions for anemia), DM, HTN, CKD BIBEMS after being found unresponsive on the floor. CTH in the ED with R SDH s/p crani 10/9 and MMA embolization. Patient was admitted to NCCU. Was eventually extubated and had some stridor, for which she completed steroids. Hospital course complicated by leaking of fluid (?seroma) from crani site for which neurosurgery is following. Hospital course further complicate by urinary retention, patient later able to void, and blood loss anemia s/p 1u PRNC on 10/16, patient now downgraded to SDU     1. Fall/Syncope secondary to Right subdural hematoma due to fall s/p Crani 10/8 followed by  MMA embolization 10/9 (for blood leak postop)   - intubated in ED for airway protection  now comfortable on room air  - serial CTHs stable, discussed with Dr Ortiz  - patient noted to have an extracalvarial fluid collection s/p aspiration by neurosurgery,   - Cultures NGTD  - c/w neurochecks q8h  - diet per s/s  - Cont keppra 500mg po bid        - Add vit b6 100 mg po daily   - Neurosx consult appreciated:  a) contact them if pt observed leaking fluid from crani site   - PT/OT:pending    2. HTN with hypertensive urgency on admission  - CXR: left sided effusion. BNP:elevated   - s/p nicadipine GTT  - currently BP acceptable, continue with Labetalol 200 q8H and amlodipine 10 mg daily   - Hold lisinopril due to hyperkalemia   - Lasix was on hold that we will resume today on 10/22/2024   - echo 10/9 - limited study, EF65-70%    3. MARCO on CKD III resolved   - resolved, had transient urinary retention, patient able to void  - Bladder scan no retention   - US abdomen:WNL   - holding bicarb 650 Q8H (home dose) CO2>22     4. Anemia mainly due to H/o MDS s/p 2 PRBC   *  currently on Lusparacept with Dr. Wade (CM checking if it can be given in [facility])   - No bleeding noted. Watch for melena   - monitor CBC, transfuse if Hb <7. Repeat CBC in the afternoon   - s/p 1 unit prbc  10/16/24. Give another unit 10/21/2024    5. Possible acute UTI associated with hematuria    - Cont Zosyn d:1   - urine cx:pending     6. DVT/GI px   - PPI daily    SIMONA KUHN  79y  [facility] [address]      Patient is a 79y old  Female who presents with a chief complaint of Traumatic subdural hemorrhage (20 Oct 2024 00:31)      INTERVAL HPI/OVERNIGHT EVENTS:    Patient feels well. barely answering my questions  agreeable with getting out of bed to chair  able to follow commands  cough noted       FAMILY HISTORY:    T(C): 36.9 (10-21-24 @ 08:11), Max: 37 (10-20-24 @ 22:50)  HR: 68 (10-21-24 @ 08:11) (60 - 88)  BP: 127/61 (10-21-24 @ 08:11) (122/57 - 152/63)  RR: 18 (10-21-24 @ 08:11) (18 - 18)  SpO2: 92% (10-21-24 @ 08:11) (92% - 98%)  Wt(kg): --Vital Signs Last 24 Hrs  T(C): 36.9 (21 Oct 2024 08:11), Max: 37 (20 Oct 2024 22:50)  T(F): 98.4 (21 Oct 2024 08:11), Max: 98.6 (20 Oct 2024 22:50)  HR: 68 (21 Oct 2024 08:11) (60 - 88)  BP: 127/61 (21 Oct 2024 08:11) (122/57 - 152/63)  BP(mean): --  RR: 18 (21 Oct 2024 08:11) (18 - 18)  SpO2: 92% (21 Oct 2024 08:11) (92% - 98%)    Parameters below as of 20 Oct 2024 11:51  Patient On (Oxygen Delivery Method): room air        PHYSICAL EXAM:  GENERAL: Looks comfortable but looks deconditioned   NERVOUS SYSTEM:  Alert & Oriented X3, Moving all extremeties but diffuse weakness noted   PULM: Faint crackles   CARDIAC: Regular rate and rhythm  GI: Soft, Nontender, Nondistended; Bowel sounds present  EXTREMITIES:  2+ Peripheral Pulses,     Consultant(s) Notes Reviewed:  [x ] YES  [ ] NO  Care Discussed with Consultants/Other Providers [ x] YES  [ ] NO    LABS:                            7.0    13.01 )-----------( 243      ( 21 Oct 2024 06:23 )             22.1   10-21    143  |  109  |  32[H]  ----------------------------<  97  3.3[L]   |  24  |  1.3    Ca    8.7      21 Oct 2024 06:23  Mg     2.2     10-21              amLODIPine   Tablet 10 milliGRAM(s) Oral daily  chlorhexidine 2% Cloths 1 Application(s) Topical <User Schedule>  dextrose 50% Injectable 50 milliLiter(s) IV Push every 15 minutes  dextrose 50% Injectable 25 milliLiter(s) IV Push every 15 minutes  dextrose 50% Injectable 25 milliLiter(s) IV Push every 15 minutes  heparin   Injectable 5000 Unit(s) SubCutaneous every 8 hours  insulin lispro (ADMELOG) corrective regimen sliding scale   SubCutaneous Before meals and at bedtime  labetalol 200 milliGRAM(s) Oral every 8 hours  levETIRAcetam 500 milliGRAM(s) Oral two times a day  mupirocin 2% Ointment 1 Application(s) Topical every 12 hours  polyethylene glycol 3350 17 Gram(s) Oral daily  senna 2 Tablet(s) Oral at bedtime  traMADol 25 milliGRAM(s) Oral every 6 hours PRN  traMADol 50 milliGRAM(s) Oral every 6 hours PRN      79F w/ PMHx of MDS (follows w/ Dr Wade, requires periodic transfusions for anemia), DM, HTN, CKD BIBEMS after being found unresponsive on the floor. CTH in the ED with R SDH s/p crani 10/9 and MMA embolization. Patient was admitted to NCCU. Was eventually extubated and had some stridor, for which she completed steroids. Hospital course complicated by leaking of fluid (?seroma) from crani site for which neurosurgery is following. Hospital course further complicate by urinary retention, patient later able to void, and blood loss anemia s/p 1u PRNC on 10/16, patient now downgraded to SDU     1. Fall/Syncope secondary to Right subdural hematoma due to fall s/p Crani 10/8 followed by  MMA embolization 10/9 (for blood leak postop)   - intubated in ED for airway protection  now comfortable on room air  - serial CTHs stable, discussed with Dr Ortiz  - patient noted to have an extracalvarial fluid collection s/p aspiration by neurosurgery,   - Cultures NGTD  - c/w neurochecks q8h  - diet per s/s  - Cont keppra 500mg po bid        - Add vit b6 100 mg po daily   - Neurosx consult appreciated:  a) contact them if pt observed leaking fluid from crani site   - PT/OT:pending    2. HTN with hypertensive urgency on admission  - CXR: left sided effusion. BNP:elevated . Repeat CXR:pending   - s/p nicadipine GTT  - currently BP acceptable, continue with Labetalol 200 q8H and amlodipine 10 mg daily   - Hold lisinopril due to hyperkalemia   - Lasix was on hold that we will resume today on 10/22/2024 (seems congested)   * pt was not on lasix at home   - echo 10/9 - limited study, EF65-70%    3. MARCO on CKD III resolved   - resolved, had transient urinary retention, patient able to void  - Bladder scan no retention   - US abdomen:WNL   - holding bicarb 650 Q8H (home dose) CO2>22     4. Anemia mainly due to H/o MDS s/p 2 PRBC   *  currently on Lusparacept with Dr. Wade ( checking if it can be given in [facility])   - No bleeding noted. Watch for melena   - monitor CBC, transfuse if Hb <7. Repeat CBC in the afternoon   - s/p 1 unit prbc  10/16/24. Give another unit 10/21/2024  - Heme consult for Lusparacep:pending  * should be given before dc     5. Possible acute UTI associated with hematuria    - Cont Zosyn d:2  - urine cx:Gram negative      6. DVT/GI px   - PPI daily         Fall/Syncope secondary to Right subdural hematoma due to fall s/p Crani 10/8 followed by  MMA embolization 10/9 (for blood leak postop)   Possible mild acute CHF. started on po lasix. not requiring oxygen. cxr to confirm congestion . bp well controlled   anemia due to MDS on lusparacept . CM wants patient to get Lusparacept next Thursday before dc to [facility] (otherwise, they won't accept the patient)   Acute UTI on zosyn. urine cx: gram negative

## 2024-10-23 LAB
ANION GAP SERPL CALC-SCNC: 12 MMOL/L — SIGNIFICANT CHANGE UP (ref 7–14)
BUN SERPL-MCNC: 30 MG/DL — HIGH (ref 10–20)
CALCIUM SERPL-MCNC: 8.4 MG/DL — SIGNIFICANT CHANGE UP (ref 8.4–10.5)
CHLORIDE SERPL-SCNC: 114 MMOL/L — HIGH (ref 98–110)
CO2 SERPL-SCNC: 18 MMOL/L — SIGNIFICANT CHANGE UP (ref 17–32)
CREAT SERPL-MCNC: 1.4 MG/DL — SIGNIFICANT CHANGE UP (ref 0.7–1.5)
EGFR: 39 ML/MIN/1.73M2 — LOW
GLUCOSE BLDC GLUCOMTR-MCNC: 111 MG/DL — HIGH (ref 70–99)
GLUCOSE BLDC GLUCOMTR-MCNC: 151 MG/DL — HIGH (ref 70–99)
GLUCOSE BLDC GLUCOMTR-MCNC: 151 MG/DL — HIGH (ref 70–99)
GLUCOSE BLDC GLUCOMTR-MCNC: 174 MG/DL — HIGH (ref 70–99)
GLUCOSE SERPL-MCNC: 136 MG/DL — HIGH (ref 70–99)
HCT VFR BLD CALC: 23.8 % — LOW (ref 37–47)
HGB BLD-MCNC: 7.9 G/DL — LOW (ref 12–16)
MCHC RBC-ENTMCNC: 30.5 PG — SIGNIFICANT CHANGE UP (ref 27–31)
MCHC RBC-ENTMCNC: 33.2 G/DL — SIGNIFICANT CHANGE UP (ref 32–37)
MCV RBC AUTO: 91.9 FL — SIGNIFICANT CHANGE UP (ref 81–99)
NRBC # BLD: 0 /100 WBCS — SIGNIFICANT CHANGE UP (ref 0–0)
PLATELET # BLD AUTO: 252 K/UL — SIGNIFICANT CHANGE UP (ref 130–400)
PMV BLD: 10.6 FL — HIGH (ref 7.4–10.4)
POTASSIUM SERPL-MCNC: 3.8 MMOL/L — SIGNIFICANT CHANGE UP (ref 3.5–5)
POTASSIUM SERPL-SCNC: 3.8 MMOL/L — SIGNIFICANT CHANGE UP (ref 3.5–5)
RBC # BLD: 2.59 M/UL — LOW (ref 4.2–5.4)
RBC # FLD: 16.1 % — HIGH (ref 11.5–14.5)
SODIUM SERPL-SCNC: 144 MMOL/L — SIGNIFICANT CHANGE UP (ref 135–146)
WBC # BLD: 9.58 K/UL — SIGNIFICANT CHANGE UP (ref 4.8–10.8)
WBC # FLD AUTO: 9.58 K/UL — SIGNIFICANT CHANGE UP (ref 4.8–10.8)

## 2024-10-23 PROCEDURE — 99222 1ST HOSP IP/OBS MODERATE 55: CPT

## 2024-10-23 PROCEDURE — 99232 SBSQ HOSP IP/OBS MODERATE 35: CPT

## 2024-10-23 PROCEDURE — 73100 X-RAY EXAM OF WRIST: CPT | Mod: 26,RT

## 2024-10-23 RX ADMIN — HEPARIN SODIUM 5000 UNIT(S): 10000 INJECTION INTRAVENOUS; SUBCUTANEOUS at 05:02

## 2024-10-23 RX ADMIN — PIPERACILLIN AND TAZOBACTAM 25 GRAM(S): .5; 4 INJECTION, POWDER, LYOPHILIZED, FOR SOLUTION INTRAVENOUS at 13:34

## 2024-10-23 RX ADMIN — LEVETIRACETAM 500 MILLIGRAM(S): 500 TABLET, FILM COATED ORAL at 05:02

## 2024-10-23 RX ADMIN — LEVETIRACETAM 500 MILLIGRAM(S): 500 TABLET, FILM COATED ORAL at 17:27

## 2024-10-23 RX ADMIN — CHLORHEXIDINE GLUCONATE 1 APPLICATION(S): 40 SOLUTION TOPICAL at 05:03

## 2024-10-23 RX ADMIN — Medication 2 TABLET(S): at 21:34

## 2024-10-23 RX ADMIN — Medication 200 MILLIGRAM(S): at 13:33

## 2024-10-23 RX ADMIN — PANTOPRAZOLE SODIUM 40 MILLIGRAM(S): 40 TABLET, DELAYED RELEASE ORAL at 05:02

## 2024-10-23 RX ADMIN — Medication 100 MILLIGRAM(S): at 11:22

## 2024-10-23 RX ADMIN — Medication 2: at 21:34

## 2024-10-23 RX ADMIN — HEPARIN SODIUM 5000 UNIT(S): 10000 INJECTION INTRAVENOUS; SUBCUTANEOUS at 13:34

## 2024-10-23 RX ADMIN — PIPERACILLIN AND TAZOBACTAM 25 GRAM(S): .5; 4 INJECTION, POWDER, LYOPHILIZED, FOR SOLUTION INTRAVENOUS at 21:34

## 2024-10-23 RX ADMIN — Medication 200 MILLIGRAM(S): at 05:03

## 2024-10-23 RX ADMIN — Medication 2: at 08:42

## 2024-10-23 RX ADMIN — HEPARIN SODIUM 5000 UNIT(S): 10000 INJECTION INTRAVENOUS; SUBCUTANEOUS at 21:34

## 2024-10-23 RX ADMIN — POLYETHYLENE GLYCOL 3350 17 GRAM(S): 17 POWDER, FOR SOLUTION ORAL at 11:22

## 2024-10-23 RX ADMIN — PIPERACILLIN AND TAZOBACTAM 25 GRAM(S): .5; 4 INJECTION, POWDER, LYOPHILIZED, FOR SOLUTION INTRAVENOUS at 05:03

## 2024-10-23 RX ADMIN — Medication 2: at 11:20

## 2024-10-23 RX ADMIN — Medication 200 MILLIGRAM(S): at 21:35

## 2024-10-23 RX ADMIN — Medication 10 MILLIGRAM(S): at 05:02

## 2024-10-23 NOTE — PROGRESS NOTE ADULT - ASSESSMENT
79F w/ PMHx of MDS (follows w/ Dr Wade, requires periodic transfusions for anemia), DM, HTN, CKD BIBEMS after being found unresponsive on the floor. CTH in the ED with R hemispheric SDH 2.2cm w/ mass effect and 1.1cm MLS s/p crani 10/9 and MMA embolization. Patient was admitted to NCCU. Was eventually extubated and had some stridor, for which she completed steroids. On 10/16, hospital course was complicated by leaking of fluid (?seroma) from crani site for which neurosurgery is following. On 10/17, hospital course further complicated by urinary retention, patient later able to void, and blood loss chronic anemia s/p 1u PRNC on 10/16.    #R SDH due to fall s/p Crani 10/8  #S/p MMA embolization 10/9  - intubated in ED for airway protection  - now comfortable on room air  - CTH stable  - patient noted to have an extracalvarial fluid collection s/p aspiration by neurosurgery, cultures NGTD  - NRSRGY: cw current mgmt, contact if csf leak  - diet per s/s  -PT/OT recommends rehab  - c/w neurochecks   - currently on keppra  -  vit b6     #Right Wrist swelling  -Patient is having swelling of the barrera wrist extending to the thenar eminence. It is tender to palpation  -VA duplex ordered to r/o any clot    #UTI  #Hematuria  -Patient was found to have blood in her primafit  -UA was obtained however could be a dirty catch  -urine cx shows gram negative rods      #HTN with hypertensive urgency on admission  - s/p nicardipine GTT  - BP controlled today  - cw Labetalol 200 q8H and amlodipine 10 mg daily   - patient on lisinopril at home, now being held due to hyperkalemia   - echo 10/9 - limited study, EF65-70%  - hold lasix for now  - BNP 1439    #MDS  - follows with Dr. Wade  - s/p 1 unit prbc  10/16/24  - monitor CBC, transfuse if Hb <7  - Hgb stable s/p 1 unite  - maintain active type & screen; latest 10/21    #MARCO on CKD III  - resolved, had transient urinary retention, patient able to void  - c/w bladder scans q6H  - holding bicarb 650 Q8H (home dose)      General:  - DVT proph- heparin  - GI proph- pantoprazole  - Diet- east to chew  - Activity- out of bed to chair  - Code Status- full code    In progress:  monitor crani site, c/w neurochecks, urine culture, cw zosyn 79F w/ PMHx of MDS (follows w/ Dr Wade, requires periodic transfusions for anemia), DM, HTN, CKD BIBEMS after being found unresponsive on the floor. CTH in the ED with R hemispheric SDH 2.2cm w/ mass effect and 1.1cm MLS s/p crani 10/9 and MMA embolization. Patient was admitted to NCCU. Was eventually extubated and had some stridor, for which she completed steroids. On 10/16, hospital course was complicated by leaking of fluid (?seroma) from crani site for which neurosurgery is following. On 10/17, hospital course further complicated by urinary retention, patient later able to void, and blood loss chronic anemia s/p 1u PRNC on 10/16.    #R SDH due to fall s/p Crani 10/8  #S/p MMA embolization 10/9  - intubated in ED for airway protection  - now comfortable on room air  - CTH stable  - patient noted to have an extracalvarial fluid collection s/p aspiration by neurosurgery, cultures NGTD  - NRSRGY: cw current mgmt, contact if csf leak  - diet per s/s  -PT/OT recommends rehab  - c/w neurochecks   - currently on keppra  - cw vit b6     #Right Wrist swelling  -Patient is having swelling of the barrera wrist extending to the thenar eminence. It is tender to palpation  -VA duplex ordered to r/o any clot    #UTI  #Hematuria  -Patient was found to have blood in her primafit  -UA was obtained however could be a dirty catch  -urine cx shows gram negative rods  -Currently on zosyn    #HTN with hypertensive urgency on admission  - s/p nicardipine GTT  - BP controlled today  - cw Labetalol 200 q8H and amlodipine 10 mg daily   - patient on lisinopril at home, now being held due to hyperkalemia   - echo 10/9 - limited study, EF65-70%  - hold lasix for now  - BNP 1439    #MDS  - follows with Dr. Wade  - s/p 1 unit prbc  10/16/24  - monitor CBC, transfuse if Hb <7  - Hgb stable s/p 1 unite  - maintain active type & screen; latest 10/21    #MARCO on CKD III  - resolved, had transient urinary retention, patient able to void  - c/w bladder scans q6H  - holding bicarb 650 Q8H (home dose)      General:  - DVT proph- heparin  - GI proph- pantoprazole  - Diet- east to chew  - Activity- out of bed to chair  - Code Status- full code    In progress:  monitor crani site, c/w neurochecks,  cw zosyn

## 2024-10-23 NOTE — PROGRESS NOTE ADULT - SUBJECTIVE AND OBJECTIVE BOX
SUBJECTIVE/OVERNIGHT EVENTS  Today is hospital day 14d. This morning patient was seen and examined at bedside, resting comfortably in bed. No acute or major events overnight. Patient states she is not feeling any pain. Expresses that she is tired and would like to sleep.      Traumatic subdural hemorrhage without loss of consciousness, initial encounter    NOMD    NOMD    NOMD    NOMD    NOMD    Abnormal finding of blood chemistry, unspecified    Acute kidney failure, unspecified    Anemia, unspecified    Chronic cough    Chronic kidney disease, unspecified    Cough, unspecified    Cutaneous abscess of back [any part, except buttock]    Cutaneous abscess of limb, unspecified    Deficiency of other specified B group vitamins    Diarrhea, unspecified    Encounter for antineoplastic chemotherapy    Essential (primary) hypertension    FALL (ON) (FROM) UNSPECIFIED STAIRS AND STEPS, INIT ENCNTR    Fever, unspecified    History of falling    Hypomagnesemia    Long term (current) use of oral hypoglycemic drugs    Myelodysplastic syndrome, unspecified    Other drug-induced agranulocytosis    Other long term (current) drug therapy    Other specified disorders of nose and nasal sinuses    Pain in right knee    Pain in unspecified knee    Personal history of diseases of the blood and blood-forming organs and certain disorders involving the immune mechanism    Personal history of other endocrine, nutritional and metabolic disease    Personal history of other medical treatment    Personal history of other specified conditions    Pneumonia, unspecified organism    Shortness of breath    Synovial cyst of popliteal space [Rios], right knee    Thrombocytopenia, unspecified    Thrombocytosis, unspecified    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 disease    Unspecified abdominal pain    Unspecified fall, initial encounter    Unspecified place or not applicable    Chronic kidney disease, stage 3b    Disorder of kidney and ureter, unspecified    Irritable bowel syndrome with diarrhea    No pertinent family history in first degree relatives    No pertinent family history in first degree relatives    No pertinent family history in first degree relatives    Handoff    MEWS Score    DM (diabetes mellitus)    MDS (myelodysplastic syndrome)    Traumatic subdural hemorrhage with brief coma    Craniotomy, emergent, for subdural hematoma evacuation    Embolization of cerebral vessel    No significant past surgical history    H/O colonoscopy    FALL HIT HEAD    90+    [id number]        MEDICATIONS  STANDING MEDICATIONS  amLODIPine   Tablet 10 milliGRAM(s) Oral daily  chlorhexidine 2% Cloths 1 Application(s) Topical <User Schedule>  dextrose 50% Injectable 50 milliLiter(s) IV Push every 15 minutes  dextrose 50% Injectable 25 milliLiter(s) IV Push every 15 minutes  dextrose 50% Injectable 25 milliLiter(s) IV Push every 15 minutes  heparin   Injectable 5000 Unit(s) SubCutaneous every 8 hours  insulin lispro (ADMELOG) corrective regimen sliding scale   SubCutaneous Before meals and at bedtime  labetalol 200 milliGRAM(s) Oral every 8 hours  levETIRAcetam 500 milliGRAM(s) Oral two times a day  mupirocin 2% Ointment 1 Application(s) Topical every 12 hours  pantoprazole    Tablet 40 milliGRAM(s) Oral before breakfast  piperacillin/tazobactam IVPB. 3.375 Gram(s) IV Intermittent once  piperacillin/tazobactam IVPB.- 3.375 Gram(s) IV Intermittent once  piperacillin/tazobactam IVPB.. 3.375 Gram(s) IV Intermittent every 8 hours  polyethylene glycol 3350 17 Gram(s) Oral daily  potassium chloride   Powder 40 milliEquivalent(s) Oral once  senna 2 Tablet(s) Oral at bedtime    PRN MEDICATIONS  traMADol 25 milliGRAM(s) Oral every 6 hours PRN  traMADol 50 milliGRAM(s) Oral every 6 hours PRN    VITALS  T(F): 98.4 (10-21-24 @ 08:11), Max: 98.6 (10-20-24 @ 22:50)  HR: 68 (10-21-24 @ 08:11) (60 - 88)  BP: 127/61 (10-21-24 @ 08:11) (122/57 - 152/63)  RR: 18 (10-21-24 @ 08:11) (18 - 18)  SpO2: 92% (10-21-24 @ 08:11) (92% - 98%)  POCT Blood Glucose.: 113 mg/dL (10-21-24 @ 08:02)  POCT Blood Glucose.: 238 mg/dL (10-20-24 @ 20:45)  POCT Blood Glucose.: 141 mg/dL (10-20-24 @ 11:16)    PHYSICAL EXAM  GENERAL  ( x ) NAD, lying in bed comfortably     (  ) obtunded     (  ) lethargic     (  ) somnolent    HEAD  ( x ) staples and sutures from cani      (  ) hematoma     (  ) laceration (specify location:       )     NECK  ( x ) Supple     (  ) neck stiffness     (  ) nuchal rigidity     (  )  no JVD     (  ) JVD present ( -- cm)    HEART  Rate -->  ( x ) normal rate    (  ) bradycardic    (  ) tachycardic  Rhythm -->  ( x ) regular    (  ) regularly irregular    (  ) irregularly irregular  Murmurs -->  (  ) normal s1/s2    (  ) systolic murmur    (  ) diastolic murmur    (  ) continuous murmur     (  ) S3 present    (  ) S4 present    LUNGS  ( x )Unlabored respirations     (  ) tachypnea  (  ) B/L air entry     (  ) decreased breath sounds in:  (location     )    (  ) no adventitious sound     (  ) crackles     (  ) wheezing      (  ) rhonchi      (specify location:       )  (  ) chest wall tenderness (specify location:       )    ABDOMEN  ( x ) Soft     (  ) tense   |   ( x ) nondistended     (  ) distended   |   (  ) +BS     (  ) hypoactive bowel sounds     (  ) hyperactive bowel sounds  ( x ) nontender     (  ) RUQ tenderness     (  ) RLQ tenderness     (  ) LLQ tenderness     (  ) epigastric tenderness     (  ) diffuse tenderness  (  ) Splenomegaly      (  ) Hepatomegaly      (  ) Jaundice     (  ) ecchymosis     NERVOUS SYSTEM  ( x ) A&Ox2     (  ) confused     (  ) lethargic  CN II-XII:     (  ) Intact     (  ) focal deficits  (Specify:     )   Upper extremities:     (  ) strength X/5     (  ) focal deficit (specify:    )  Lower extremities:     (  ) strength  X/5    (  ) focal deficit (specify:    )      LABS             7.0    13.01 )-----------( 243      ( 10-21-24 @ 06:23 )             22.1     143  |  109  |  32  -------------------------<  97   10-21-24 @ 06:23  3.3  |  24  |  1.3    Ca      8.7     10-21-24 @ 06:23  Mg     2.2     10-21-24 @ 06:23          Urinalysis Basic - ( 21 Oct 2024 06:23 )    Color: x / Appearance: x / SG: x / pH: x  Gluc: 97 mg/dL / Ketone: x  / Bili: x / Urobili: x   Blood: x / Protein: x / Nitrite: x   Leuk Esterase: x / RBC: x / WBC x   Sq Epi: x / Non Sq Epi: x / Bacteria: x          IMAGING

## 2024-10-23 NOTE — PROGRESS NOTE ADULT - ASSESSMENT
79-year-old female PMH MDS (follows with Dr. Wade), DM, HTN, CKD BIBEMS after being found unresponsive on floor of her apartment. FOund to have Right SDH s/p craniotomy    # Right SDH s/p craniotomy and evacuation  # s/p MMA embolization 10/9/24:  # MDS    She was previously on REvlimid and Vidaza, currently on Lusparacept with Dr. Wade  Hb 6.9 on presentation, has received blood transfusions during hospitalization  Last blood transfusion on 10/21, 1 unit  Please check iron panel, ferritin, vitamin b12, folic acid, retics, LDH, Haptoglobin  She is due for Lusparacept, discussed with pharmacy, would recommend outpatient follow up for her next injection.

## 2024-10-23 NOTE — PROGRESS NOTE ADULT - SUBJECTIVE AND OBJECTIVE BOX
SIMONA KUHN 79y Female  MRN#: [id number]   Hospital Day: 15d    SUBJECTIVE  Patient is a 79y old Female who presents with a chief complaint of Traumatic subdural hemorrhage (20 Oct 2024 00:31)  Currently admitted to medicine with the primary diagnosis of Traumatic subdural hemorrhage with brief coma      INTERVAL HPI AND OVERNIGHT EVENTS:  Patient was examined and seen at bedside.  She is on chair this morning  Reports having pain and weakness in right arm    REVIEW OF SYMPTOMS:  Right arm pain and weakness    OBJECTIVE  PAST MEDICAL & SURGICAL HISTORY  DM (diabetes mellitus)    MDS (myelodysplastic syndrome)    H/O colonoscopy      ALLERGIES:  No Known Allergies    MEDICATIONS:  STANDING MEDICATIONS  amLODIPine   Tablet 10 milliGRAM(s) Oral daily  chlorhexidine 2% Cloths 1 Application(s) Topical <User Schedule>  dextrose 50% Injectable 25 milliLiter(s) IV Push every 15 minutes  dextrose 50% Injectable 50 milliLiter(s) IV Push every 15 minutes  dextrose 50% Injectable 25 milliLiter(s) IV Push every 15 minutes  heparin   Injectable 5000 Unit(s) SubCutaneous every 8 hours  insulin lispro (ADMELOG) corrective regimen sliding scale   SubCutaneous Before meals and at bedtime  labetalol 200 milliGRAM(s) Oral every 8 hours  levETIRAcetam 500 milliGRAM(s) Oral two times a day  pantoprazole    Tablet 40 milliGRAM(s) Oral before breakfast  piperacillin/tazobactam IVPB.. 3.375 Gram(s) IV Intermittent every 8 hours  polyethylene glycol 3350 17 Gram(s) Oral daily  pyridoxine 100 milliGRAM(s) Oral daily  senna 2 Tablet(s) Oral at bedtime    PRN MEDICATIONS      VITAL SIGNS: Last 24 Hours  T(C): 36.7 (23 Oct 2024 08:40), Max: 36.9 (23 Oct 2024 00:00)  T(F): 98.1 (23 Oct 2024 08:40), Max: 98.4 (23 Oct 2024 00:00)  HR: 65 (23 Oct 2024 13:23) (65 - 70)  BP: 150/67 (23 Oct 2024 13:23) (127/70 - 150/67)  BP(mean): --  RR: 18 (23 Oct 2024 13:23) (18 - 18)  SpO2: 97% (23 Oct 2024 08:40) (95% - 97%)    LABS:                        7.9    9.58  )-----------( 252      ( 23 Oct 2024 04:30 )             23.8     10-23    144  |  114[H]  |  30[H]  ----------------------------<  136[H]  3.8   |  18  |  1.4    Ca    8.4      23 Oct 2024 04:30        Urinalysis Basic - ( 23 Oct 2024 04:30 )    Color: x / Appearance: x / SG: x / pH: x  Gluc: 136 mg/dL / Ketone: x  / Bili: x / Urobili: x   Blood: x / Protein: x / Nitrite: x   Leuk Esterase: x / RBC: x / WBC x   Sq Epi: x / Non Sq Epi: x / Bacteria: x            Culture - Urine (collected 21 Oct 2024 10:32)  Source: Catheterized Catheterized  Final Report (22 Oct 2024 17:43):    >=3 organisms. Probable collection contamination.          RADIOLOGY:      PHYSICAL EXAM:  CONSTITUTIONAL: No acute distress, well-developed, well-groomed, AAOx3  HEAD: Atraumatic, normocephalic  EYES: EOM intact, PERRLA, conjunctiva and sclera clear  ENT: Supple, no masses, no thyromegaly, no bruits, no JVD; moist mucous membranes  PULMONARY: Clear to auscultation bilaterally; no wheezes, rales, or rhonchi  CARDIOVASCULAR: Regular rate and rhythm; no murmurs, rubs, or gallops  GASTROINTESTINAL: Soft, non-tender, non-distended; bowel sounds present  MUSCULOSKELETAL: 2+ peripheral pulses; no clubbing, no cyanosis, no edema  NEUROLOGY: POwer 3/5 on Right upper extremity, has pain on right wrist, pain 4/5 on all other  SKIN: No rashes or lesions; warm and dry

## 2024-10-24 LAB
ANION GAP SERPL CALC-SCNC: 12 MMOL/L — SIGNIFICANT CHANGE UP (ref 7–14)
BUN SERPL-MCNC: 26 MG/DL — HIGH (ref 10–20)
CALCIUM SERPL-MCNC: 8.5 MG/DL — SIGNIFICANT CHANGE UP (ref 8.4–10.5)
CHLORIDE SERPL-SCNC: 114 MMOL/L — HIGH (ref 98–110)
CO2 SERPL-SCNC: 20 MMOL/L — SIGNIFICANT CHANGE UP (ref 17–32)
CREAT SERPL-MCNC: 1.3 MG/DL — SIGNIFICANT CHANGE UP (ref 0.7–1.5)
EGFR: 42 ML/MIN/1.73M2 — LOW
GLUCOSE BLDC GLUCOMTR-MCNC: 115 MG/DL — HIGH (ref 70–99)
GLUCOSE BLDC GLUCOMTR-MCNC: 205 MG/DL — HIGH (ref 70–99)
GLUCOSE BLDC GLUCOMTR-MCNC: 216 MG/DL — HIGH (ref 70–99)
GLUCOSE BLDC GLUCOMTR-MCNC: 94 MG/DL — SIGNIFICANT CHANGE UP (ref 70–99)
GLUCOSE SERPL-MCNC: 99 MG/DL — SIGNIFICANT CHANGE UP (ref 70–99)
HCT VFR BLD CALC: 24.5 % — LOW (ref 37–47)
HGB BLD-MCNC: 8.1 G/DL — LOW (ref 12–16)
IRON SATN MFR SERPL: 113 UG/DL — SIGNIFICANT CHANGE UP (ref 35–150)
LDH SERPL L TO P-CCNC: 190 — SIGNIFICANT CHANGE UP (ref 50–242)
MCHC RBC-ENTMCNC: 30.5 PG — SIGNIFICANT CHANGE UP (ref 27–31)
MCHC RBC-ENTMCNC: 33.1 G/DL — SIGNIFICANT CHANGE UP (ref 32–37)
MCV RBC AUTO: 92.1 FL — SIGNIFICANT CHANGE UP (ref 81–99)
NRBC # BLD: 0 /100 WBCS — SIGNIFICANT CHANGE UP (ref 0–0)
PLATELET # BLD AUTO: 271 K/UL — SIGNIFICANT CHANGE UP (ref 130–400)
PMV BLD: 10.9 FL — HIGH (ref 7.4–10.4)
POTASSIUM SERPL-MCNC: 3.4 MMOL/L — LOW (ref 3.5–5)
POTASSIUM SERPL-SCNC: 3.4 MMOL/L — LOW (ref 3.5–5)
RBC # BLD: 2.59 M/UL — LOW (ref 4.2–5.4)
RBC # BLD: 2.66 M/UL — LOW (ref 4.2–5.4)
RBC # FLD: 15.8 % — HIGH (ref 11.5–14.5)
RETICS #: 10.1 K/UL — LOW (ref 25–125)
RETICS/RBC NFR: 0.4 % — LOW (ref 0.5–1.5)
SODIUM SERPL-SCNC: 146 MMOL/L — SIGNIFICANT CHANGE UP (ref 135–146)
TIBC SERPL-MCNC: <130 UG/DL — LOW (ref 220–430)
UIBC SERPL-MCNC: <17 UG/DL — LOW (ref 110–370)
WBC # BLD: 8.47 K/UL — SIGNIFICANT CHANGE UP (ref 4.8–10.8)
WBC # FLD AUTO: 8.47 K/UL — SIGNIFICANT CHANGE UP (ref 4.8–10.8)

## 2024-10-24 PROCEDURE — 93971 EXTREMITY STUDY: CPT | Mod: 26,RT

## 2024-10-24 PROCEDURE — 99232 SBSQ HOSP IP/OBS MODERATE 35: CPT

## 2024-10-24 RX ORDER — POTASSIUM CHLORIDE 10 MEQ
40 TABLET, EXTENDED RELEASE ORAL ONCE
Refills: 0 | Status: COMPLETED | OUTPATIENT
Start: 2024-10-24 | End: 2024-10-24

## 2024-10-24 RX ADMIN — Medication 200 MILLIGRAM(S): at 05:47

## 2024-10-24 RX ADMIN — PIPERACILLIN AND TAZOBACTAM 25 GRAM(S): .5; 4 INJECTION, POWDER, LYOPHILIZED, FOR SOLUTION INTRAVENOUS at 05:47

## 2024-10-24 RX ADMIN — Medication 200 MILLIGRAM(S): at 21:11

## 2024-10-24 RX ADMIN — PANTOPRAZOLE SODIUM 40 MILLIGRAM(S): 40 TABLET, DELAYED RELEASE ORAL at 05:48

## 2024-10-24 RX ADMIN — Medication 100 MILLIGRAM(S): at 11:06

## 2024-10-24 RX ADMIN — HEPARIN SODIUM 5000 UNIT(S): 10000 INJECTION INTRAVENOUS; SUBCUTANEOUS at 05:47

## 2024-10-24 RX ADMIN — LEVETIRACETAM 500 MILLIGRAM(S): 500 TABLET, FILM COATED ORAL at 05:47

## 2024-10-24 RX ADMIN — Medication 4: at 11:05

## 2024-10-24 RX ADMIN — CHLORHEXIDINE GLUCONATE 1 APPLICATION(S): 40 SOLUTION TOPICAL at 05:47

## 2024-10-24 RX ADMIN — Medication 10 MILLIGRAM(S): at 05:47

## 2024-10-24 RX ADMIN — LEVETIRACETAM 500 MILLIGRAM(S): 500 TABLET, FILM COATED ORAL at 17:36

## 2024-10-24 RX ADMIN — HEPARIN SODIUM 5000 UNIT(S): 10000 INJECTION INTRAVENOUS; SUBCUTANEOUS at 21:12

## 2024-10-24 RX ADMIN — Medication 4: at 21:10

## 2024-10-24 RX ADMIN — POLYETHYLENE GLYCOL 3350 17 GRAM(S): 17 POWDER, FOR SOLUTION ORAL at 11:05

## 2024-10-24 RX ADMIN — Medication 2 TABLET(S): at 21:12

## 2024-10-24 RX ADMIN — Medication 40 MILLIEQUIVALENT(S): at 11:06

## 2024-10-24 NOTE — PROGRESS NOTE ADULT - ATTENDING COMMENTS
79F w/ PMHx of MDS (follows w/ Dr Wade, requires periodic transfusions for anemia), DM, HTN, CKD BIBEMS after being found unresponsive on the floor. CTH in the ED with R hemispheric SDH 2.2cm w/ mass effect and 1.1cm MLS s/p crani 10/9 and MMA embolization. Patient was admitted to [facility]. Was eventually extubated and had some stridor, for which she completed steroids. On 10/16, hospital course was complicated by leaking of fluid (?seroma) from crani site for which neurosurgery is following. On 10/17, hospital course further complicated by urinary retention, patient later able to void, and blood loss chronic anemia s/p 1u PRNC on 10/16.    #R SDH due to fall s/p Crani 10/8  #S/p MMA embolization 10/9  - intubated in ED for airway protection  - now comfortable on room air  - CTH stable  - patient noted to have an extracalvarial fluid collection s/p aspiration by neurosurgery, cultures NGTD  - NRSRGY: cw current mgmt, contact if csf leak  - diet per s/s  -PT/OT recommends rehab  - c/w neurochecks   - currently on keppra  -  vit b6   - As per neuro patient can follow up outpatient to remove staples    #Right Wrist swelling  -Patient is having swelling of the barrera wrist extending to the thenar eminence. It is tender to palpation  -VA duplex ordered to r/o any clot  -Xray of wrist wet read: soft tissue swelling    #UTI  #Hematuria  -Patient was found to have blood in her primafit  -UA was obtained however could be a dirty catch  -urine cx shows gram negative rods  -waiting for sensitivities   -Currently on zosyn started on 10/21    #HTN with hypertensive urgency on admission  - s/p nicardipine GTT  - BP controlled today  - cw Labetalol 200 q8H and amlodipine 10 mg daily   - patient on lisinopril at home, now being held due to hyperkalemia   - echo 10/9 - limited study, EF65-70%  - hold lasix for now  - BNP 1439    #MDS  - follows with Dr. Wade  - patient states she gets daily transfusions  - s/p 1 unit prbc  10/16/24 and 10/21  - monitor CBC, transfuse if Hb <7  - maintain active type & screen; latest 10/21- reordered for today    #MARCO on CKD III  - resolved, had transient urinary retention, patient able to void  - c/w bladder scans q6H  - holding bicarb 650 Q8H (home dose)        - DVT proph- heparin  - GI proph- pantoprazole  - Diet- east to chew  - Code Status- full code    pending:  monitor crani site, c/w neurochecks, cw zosyn, f/u duplex, UCx
seen /examined w/ Hemonc team; note reviewed; case discussed; agree w/ plan
79F w/ PMHx of MDS (follows w/ Dr Wade, requires periodic transfusions for anemia), DM, HTN, CKD BIBEMS after being found unresponsive on the floor. CTH in the ED with R hemispheric SDH 2.2cm w/ mass effect and 1.1cm MLS s/p crani 10/9 and MMA embolization. Patient was admitted to [facility]. Was eventually extubated and had some stridor, for which she completed steroids. On 10/16, hospital course was complicated by leaking of fluid (?seroma) from crani site for which neurosurgery is following. On 10/17, hospital course further complicated by urinary retention, patient later able to void, and blood loss chronic anemia s/p 1u PRNC on 10/16.    #R SDH due to fall s/p Crani 10/8  #S/p MMA embolization 10/9  - intubated in ED for airway protection  - now comfortable on room air  - CTH stable  - patient noted to have an extracalvarial fluid collection s/p aspiration by neurosurgery, cultures NGTD  - NRSRGY: cw current mgmt, contact if csf leak  - diet per s/s  -PT/OT recommends rehab  - c/w neurochecks   - currently on keppra  - cw vit b6     #Right Wrist swelling  -Patient is having swelling of the barrera wrist extending to the thenar eminence. It is tender to palpation  -VA duplex ordered to r/o any clot    #UTI  #Hematuria  -Patient was found to have blood in her primafit  -UA was obtained however could be a dirty catch  -urine cx shows gram negative rods  -Currently on zosyn    #HTN with hypertensive urgency on admission  - s/p nicardipine GTT  - BP controlled today  - cw Labetalol 200 q8H and amlodipine 10 mg daily   - patient on lisinopril at home, now being held due to hyperkalemia   - echo 10/9 - limited study, EF65-70%  - hold lasix for now  - BNP 1439    #MDS  - follows with Dr. Wade  - s/p 1 unit prbc  10/16/24  - monitor CBC, transfuse if Hb <7  - Hgb stable s/p 1 unite  - maintain active type & screen; latest 10/21    #MARCO on CKD III  - resolved, had transient urinary retention, patient able to void  - c/w bladder scans q6H  - holding bicarb 650 Q8H (home dose)        - DVT proph- heparin  - GI proph- pantoprazole  - Code Status- full code    In progress:  monitor crani site, c/w neurochecks,  cw zosyn

## 2024-10-24 NOTE — PROGRESS NOTE ADULT - ASSESSMENT
79F w/ PMHx of MDS (follows w/ Dr Wade, requires periodic transfusions for anemia), DM, HTN, CKD BIBEMS after being found unresponsive on the floor. CTH in the ED with R hemispheric SDH 2.2cm w/ mass effect and 1.1cm MLS s/p crani 10/9 and MMA embolization. Patient was admitted to [facility]. Was eventually extubated and had some stridor, for which she completed steroids. On 10/16, hospital course was complicated by leaking of fluid (?seroma) from crani site for which neurosurgery is following. On 10/17, hospital course further complicated by urinary retention, patient later able to void, and blood loss chronic anemia s/p 1u PRNC on 10/16.    #R SDH due to fall s/p Crani 10/8  #S/p MMA embolization 10/9  - intubated in ED for airway protection  - now comfortable on room air  - CTH stable  - patient noted to have an extracalvarial fluid collection s/p aspiration by neurosurgery, cultures NGTD  - NRSRGY: cw current mgmt, contact if csf leak  - diet per s/s  -PT/OT recommends rehab  - c/w neurochecks   - currently on keppra  -  vit b6   - As per neuro patient can follow up outpatient to remove staples    #Right Wrist swelling  -Patient is having swelling of the barrera wrist extending to the thenar eminence. It is tender to palpation  -VA duplex ordered to r/o any clot  -Xray of wrist wet read: soft tissue swelling    #UTI  #Hematuria  -Patient was found to have blood in her primafit  -UA was obtained however could be a dirty catch  -urine cx shows gram negative rods  -waiting for sensitivities   -Currently on zosyn started on 10/21    #HTN with hypertensive urgency on admission  - s/p nicardipine GTT  - BP controlled today  - cw Labetalol 200 q8H and amlodipine 10 mg daily   - patient on lisinopril at home, now being held due to hyperkalemia   - echo 10/9 - limited study, EF65-70%  - hold lasix for now  - BNP 1439    #MDS  - follows with Dr. Wade  - patient states she gets daily transfusions  - s/p 1 unit prbc  10/16/24 and 10/21  - monitor CBC, transfuse if Hb <7  - maintain active type & screen; latest 10/21- reordered for today    #MARCO on CKD III  - resolved, had transient urinary retention, patient able to void  - c/w bladder scans q6H  - holding bicarb 650 Q8H (home dose)      General:  - DVT proph- heparin  - GI proph- pantoprazole  - Diet- east to chew  - Activity- out of bed to chair  - Code Status- full code    In progress:  monitor crani site, c/w neurochecks, cw zosyn, f/u duplex

## 2024-10-24 NOTE — PROGRESS NOTE ADULT - SUBJECTIVE AND OBJECTIVE BOX
SUBJECTIVE/OVERNIGHT EVENTS  Today is hospital day 16d. This morning patient was seen and examined at bedside, resting comfortably in bed. Patient states that her right wrist is increasing in pain and swelling and is tender to the touch. She denies any headaches, nausea, vomiting, chest pain. Still is coughing.      Traumatic subdural hemorrhage without loss of consciousness, initial encounter    NOMD    NOMD    NOMD    NOMD    NOMD    Abnormal finding of blood chemistry, unspecified    Acute kidney failure, unspecified    Anemia, unspecified    Chronic cough    Chronic kidney disease, unspecified    Cough, unspecified    Cutaneous abscess of back [any part, except buttock]    Cutaneous abscess of limb, unspecified    Deficiency of other specified B group vitamins    Diarrhea, unspecified    Encounter for antineoplastic chemotherapy    Essential (primary) hypertension    FALL (ON) (FROM) UNSPECIFIED STAIRS AND STEPS, INIT ENCNTR    Fever, unspecified    History of falling    Hypomagnesemia    Long term (current) use of oral hypoglycemic drugs    Myelodysplastic syndrome, unspecified    Other drug-induced agranulocytosis    Other long term (current) drug therapy    Other specified disorders of nose and nasal sinuses    Pain in right knee    Pain in unspecified knee    Personal history of diseases of the blood and blood-forming organs and certain disorders involving the immune mechanism    Personal history of other endocrine, nutritional and metabolic disease    Personal history of other medical treatment    Personal history of other specified conditions    Pneumonia, unspecified organism    Shortness of breath    Synovial cyst of popliteal space [[place]], right knee    Thrombocytopenia, unspecified    Thrombocytosis, unspecified    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 disease    Unspecified abdominal pain    Unspecified fall, initial encounter    Unspecified place or not applicable    Chronic kidney disease, stage 3b    Disorder of kidney and ureter, unspecified    Irritable bowel syndrome with diarrhea    No pertinent family history in first degree relatives    No pertinent family history in first degree relatives    No pertinent family history in first degree relatives    Handoff    MEWS Score    DM (diabetes mellitus)    MDS (myelodysplastic syndrome)    Traumatic subdural hemorrhage with brief coma    Craniotomy, emergent, for subdural hematoma evacuation    Embolization of cerebral vessel    No significant past surgical history    H/O colonoscopy    FALL HIT HEAD    90+    [id number]        MEDICATIONS  STANDING MEDICATIONS  amLODIPine   Tablet 10 milliGRAM(s) Oral daily  chlorhexidine 2% Cloths 1 Application(s) Topical <User Schedule>  dextrose 50% Injectable 50 milliLiter(s) IV Push every 15 minutes  dextrose 50% Injectable 25 milliLiter(s) IV Push every 15 minutes  dextrose 50% Injectable 25 milliLiter(s) IV Push every 15 minutes  heparin   Injectable 5000 Unit(s) SubCutaneous every 8 hours  insulin lispro (ADMELOG) corrective regimen sliding scale   SubCutaneous Before meals and at bedtime  labetalol 200 milliGRAM(s) Oral every 8 hours  levETIRAcetam 500 milliGRAM(s) Oral two times a day  pantoprazole    Tablet 40 milliGRAM(s) Oral before breakfast  piperacillin/tazobactam IVPB.. 3.375 Gram(s) IV Intermittent every 8 hours  polyethylene glycol 3350 17 Gram(s) Oral daily  potassium chloride   Powder 40 milliEquivalent(s) Oral once  pyridoxine 100 milliGRAM(s) Oral daily  senna 2 Tablet(s) Oral at bedtime    PRN MEDICATIONS    VITALS  T(F): 98.1 (10-24-24 @ 08:59), Max: 98.7 (10-24-24 @ 00:48)  HR: 67 (10-24-24 @ 08:59) (63 - 86)  BP: 150/63 (10-24-24 @ 08:59) (137/74 - 150/67)  RR: 18 (10-24-24 @ 08:59) (18 - 18)  SpO2: 95% (10-24-24 @ 08:59) (95% - 98%)  POCT Blood Glucose.: 115 mg/dL (10-24-24 @ 07:32)  POCT Blood Glucose.: 151 mg/dL (10-23-24 @ 20:46)  POCT Blood Glucose.: 111 mg/dL (10-23-24 @ 17:19)  POCT Blood Glucose.: 174 mg/dL (10-23-24 @ 11:05)    PHYSICAL EXAM  GENERAL  ( x ) NAD, lying in bed comfortably     (  ) obtunded     (  ) lethargic     (  ) somnolent    HEAD  ( x ) Staples across cani site no redness, swelling     (  ) hematoma     (  ) laceration (specify location:       )     NECK  ( x ) Supple     (  ) neck stiffness     (  ) nuchal rigidity     (  )  no JVD     (  ) JVD present ( -- cm)    HEART  Rate -->  ( x ) normal rate    (  ) bradycardic    (  ) tachycardic  Rhythm -->  ( x ) regular    (  ) regularly irregular    (  ) irregularly irregular  Murmurs -->  (  ) normal s1/s2    (  ) systolic murmur    (  ) diastolic murmur    (  ) continuous murmur     (  ) S3 present    (  ) S4 present    LUNGS  ( x )Unlabored respirations     (  ) tachypnea  (  ) B/L air entry     (  ) decreased breath sounds in:  (location     )    (  ) no adventitious sound     (  ) crackles     (  ) wheezing      (  ) rhonchi      (specify location:       )  (  ) chest wall tenderness (specify location:       )    ABDOMEN  (x  ) Soft     (  ) tense   |   ( x ) nondistended     (  ) distended   |   (  ) +BS     (  ) hypoactive bowel sounds     (  ) hyperactive bowel sounds  (  ) nontender     (  ) RUQ tenderness     (  ) RLQ tenderness     (  ) LLQ tenderness     (  ) epigastric tenderness     (  ) diffuse tenderness  (  ) Splenomegaly      (  ) Hepatomegaly      (  ) Jaundice     (  ) ecchymosis     EXTREMITIES  (  ) Normal  ( x ) right wrist warm and tender to the touch elevating to the thenar eminence  (  ) Rash     (  ) ecchymosis     (  ) varicose veins      (  ) pitting edema     (  ) non-pitting edema   (  ) ulceration     (  ) gangrene:     (location:     )    NERVOUS SYSTEM  ( x ) A&Ox3     (  ) confused     (  ) lethargic  CN II-XII:     (  ) Intact     (  ) focal deficits  (Specify:     )   Upper extremities:     (  ) strength X/5     (  ) focal deficit (specify:    )  Lower extremities:     (  ) strength  X/5    (  ) focal deficit (specify:    )      LABS             8.1    8.47  )-----------( 271      ( 10-24-24 @ 05:58 )             24.5     146  |  114  |  26  -------------------------<  99   10-24-24 @ 05:58  3.4  |  20  |  1.3    Ca      8.5     10-24-24 @ 05:58        Pro-Brain Natriuretic Peptide: 1439 pg/mL (10-22-24 @ 07:07)    Urinalysis Basic - ( 24 Oct 2024 05:58 )    Color: x / Appearance: x / SG: x / pH: x  Gluc: 99 mg/dL / Ketone: x  / Bili: x / Urobili: x   Blood: x / Protein: x / Nitrite: x   Leuk Esterase: x / RBC: x / WBC x   Sq Epi: x / Non Sq Epi: x / Bacteria: x          Culture - Urine (collected 21 Oct 2024 10:32)  Source: Catheterized Catheterized  Final Report (22 Oct 2024 17:43):    >=3 organisms. Probable collection contamination.      IMAGING

## 2024-10-25 ENCOUNTER — APPOINTMENT (OUTPATIENT)
Age: 79
End: 2024-10-25

## 2024-10-25 LAB
ANION GAP SERPL CALC-SCNC: 11 MMOL/L — SIGNIFICANT CHANGE UP (ref 7–14)
BUN SERPL-MCNC: 22 MG/DL — HIGH (ref 10–20)
CALCIUM SERPL-MCNC: 8.9 MG/DL — SIGNIFICANT CHANGE UP (ref 8.4–10.4)
CHLORIDE SERPL-SCNC: 112 MMOL/L — HIGH (ref 98–110)
CO2 SERPL-SCNC: 19 MMOL/L — SIGNIFICANT CHANGE UP (ref 17–32)
CREAT SERPL-MCNC: 1.3 MG/DL — SIGNIFICANT CHANGE UP (ref 0.7–1.5)
CULTURE RESULTS: ABNORMAL
EGFR: 42 ML/MIN/1.73M2 — LOW
FERRITIN SERPL-MCNC: 4160 NG/ML — HIGH (ref 13–330)
FOLATE SERPL-MCNC: 5.1 NG/ML — SIGNIFICANT CHANGE UP
GLUCOSE BLDC GLUCOMTR-MCNC: 123 MG/DL — HIGH (ref 70–99)
GLUCOSE BLDC GLUCOMTR-MCNC: 193 MG/DL — HIGH (ref 70–99)
GLUCOSE BLDC GLUCOMTR-MCNC: 247 MG/DL — HIGH (ref 70–99)
GLUCOSE BLDC GLUCOMTR-MCNC: 97 MG/DL — SIGNIFICANT CHANGE UP (ref 70–99)
GLUCOSE SERPL-MCNC: 86 MG/DL — SIGNIFICANT CHANGE UP (ref 70–99)
HAPTOGLOB SERPL-MCNC: 63 MG/DL — SIGNIFICANT CHANGE UP (ref 34–200)
HCT VFR BLD CALC: 25.7 % — LOW (ref 37–47)
HGB BLD-MCNC: 8.3 G/DL — LOW (ref 12–16)
MAGNESIUM SERPL-MCNC: 1.7 MG/DL — LOW (ref 1.8–2.4)
MCHC RBC-ENTMCNC: 29.9 PG — SIGNIFICANT CHANGE UP (ref 27–31)
MCHC RBC-ENTMCNC: 32.3 G/DL — SIGNIFICANT CHANGE UP (ref 32–37)
MCV RBC AUTO: 92.4 FL — SIGNIFICANT CHANGE UP (ref 81–99)
NRBC # BLD: 0 /100 WBCS — SIGNIFICANT CHANGE UP (ref 0–0)
PLATELET # BLD AUTO: 291 K/UL — SIGNIFICANT CHANGE UP (ref 130–400)
PMV BLD: 10.4 FL — SIGNIFICANT CHANGE UP (ref 7.4–10.4)
POTASSIUM SERPL-MCNC: 4 MMOL/L — SIGNIFICANT CHANGE UP (ref 3.5–5)
POTASSIUM SERPL-SCNC: 4 MMOL/L — SIGNIFICANT CHANGE UP (ref 3.5–5)
RBC # BLD: 2.78 M/UL — LOW (ref 4.2–5.4)
RBC # FLD: 15.6 % — HIGH (ref 11.5–14.5)
SODIUM SERPL-SCNC: 142 MMOL/L — SIGNIFICANT CHANGE UP (ref 135–146)
SPECIMEN SOURCE: SIGNIFICANT CHANGE UP
VIT B12 SERPL-MCNC: 1255 PG/ML — HIGH (ref 232–1245)
WBC # BLD: 7.38 K/UL — SIGNIFICANT CHANGE UP (ref 4.8–10.8)
WBC # FLD AUTO: 7.38 K/UL — SIGNIFICANT CHANGE UP (ref 4.8–10.8)

## 2024-10-25 PROCEDURE — 99232 SBSQ HOSP IP/OBS MODERATE 35: CPT

## 2024-10-25 PROCEDURE — 70450 CT HEAD/BRAIN W/O DYE: CPT | Mod: 26

## 2024-10-25 RX ORDER — MAGNESIUM SULFATE IN 0.9% NACL 2 G/50 ML
2 INTRAVENOUS SOLUTION, PIGGYBACK (ML) INTRAVENOUS
Refills: 0 | Status: COMPLETED | OUTPATIENT
Start: 2024-10-25 | End: 2024-10-25

## 2024-10-25 RX ADMIN — Medication 200 MILLIGRAM(S): at 21:14

## 2024-10-25 RX ADMIN — LEVETIRACETAM 500 MILLIGRAM(S): 500 TABLET, FILM COATED ORAL at 05:13

## 2024-10-25 RX ADMIN — Medication 25 GRAM(S): at 15:16

## 2024-10-25 RX ADMIN — Medication 25 GRAM(S): at 13:15

## 2024-10-25 RX ADMIN — Medication 100 MILLIGRAM(S): at 11:49

## 2024-10-25 RX ADMIN — Medication 10 MILLIGRAM(S): at 05:13

## 2024-10-25 RX ADMIN — Medication 200 MILLIGRAM(S): at 05:13

## 2024-10-25 RX ADMIN — Medication 2: at 17:12

## 2024-10-25 RX ADMIN — PANTOPRAZOLE SODIUM 40 MILLIGRAM(S): 40 TABLET, DELAYED RELEASE ORAL at 05:13

## 2024-10-25 RX ADMIN — Medication 200 MILLIGRAM(S): at 13:18

## 2024-10-25 RX ADMIN — HEPARIN SODIUM 5000 UNIT(S): 10000 INJECTION INTRAVENOUS; SUBCUTANEOUS at 21:14

## 2024-10-25 RX ADMIN — LEVETIRACETAM 500 MILLIGRAM(S): 500 TABLET, FILM COATED ORAL at 17:30

## 2024-10-25 RX ADMIN — Medication 4: at 21:15

## 2024-10-25 RX ADMIN — HEPARIN SODIUM 5000 UNIT(S): 10000 INJECTION INTRAVENOUS; SUBCUTANEOUS at 13:18

## 2024-10-25 RX ADMIN — CHLORHEXIDINE GLUCONATE 1 APPLICATION(S): 40 SOLUTION TOPICAL at 05:14

## 2024-10-25 RX ADMIN — HEPARIN SODIUM 5000 UNIT(S): 10000 INJECTION INTRAVENOUS; SUBCUTANEOUS at 05:12

## 2024-10-25 RX ADMIN — Medication 2 TABLET(S): at 21:15

## 2024-10-25 NOTE — PROGRESS NOTE ADULT - ASSESSMENT
79F w/ PMHx of MDS (follows w/ Dr Wade, requires periodic transfusions for anemia), DM, HTN, CKD BIBEMS after being found unresponsive on the floor. CTH in the ED with R hemispheric SDH 2.2cm w/ mass effect and 1.1cm MLS s/p crani 10/9 and MMA embolization. Patient was admitted to NCCU. Was eventually extubated and had some stridor, for which she completed steroids. On 10/16, hospital course was complicated by leaking of fluid (?seroma) from crani site for which neurosurgery is following. On 10/17, hospital course further complicated by urinary retention, patient later able to void, and blood loss chronic anemia s/p 1u PRNC on 10/16.    #R SDH due to fall s/p Crani 10/8  #S/p MMA embolization 10/9  - intubated in ED for airway protection  - now comfortable on room air  - CTH stable  - patient noted to have an extracalvarial fluid collection s/p aspiration by neurosurgery, cultures NGTD  - NRSRGY: cw current mgmt, contact if csf leak  - diet per s/s  -PT/OT recommends rehab  - c/w neurochecks   - currently on keppra  -  vit b6   - As per neuro patient can follow up outpatient to remove staples    #Right Wrist swelling  -Patient is having swelling of the barrera wrist extending to the thenar eminence. It is tender to palpation  -VA duplex ordered to r/o any clot  -Xray of wrist wet read: soft tissue swelling    #UTI  #Hematuria  -Patient was found to have blood in her primafit  -UA was obtained however could be a dirty catch  -urine cx shows gram negative rods  -waiting for sensitivities   -Currently on zosyn started on 10/21    #HTN with hypertensive urgency on admission  - s/p nicardipine GTT  - BP controlled today  - cw Labetalol 200 q8H and amlodipine 10 mg daily   - patient on lisinopril at home, now being held due to hyperkalemia   - echo 10/9 - limited study, EF65-70%  - hold lasix for now  - BNP 1439    #MDS  - follows with Dr. Wade  - patient states she gets daily transfusions  - s/p 1 unit prbc  10/16/24 and 10/21  - monitor CBC, transfuse if Hb <7  - maintain active type & screen; latest 10/21- reordered for today    #MARCO on CKD III  - resolved, had transient urinary retention, patient able to void  - c/w bladder scans q6H  - holding bicarb 650 Q8H (home dose)        - DVT proph- heparin  - GI proph- pantoprazole  - Diet- east to chew  - Code Status- full code    pending:  monitor crani site, c/w neurochecks, cw zosyn, f/u duplex, UCx .     79F w/ PMHx of MDS (follows w/ Dr Wade, requires periodic transfusions for anemia), DM, HTN, CKD BIBEMS after being found unresponsive on the floor. CTH in the ED with R hemispheric SDH 2.2cm w/ mass effect and 1.1cm MLS s/p crani 10/9 and MMA embolization. Patient was admitted to NCCU. Was eventually extubated and had some stridor, for which she completed steroids. On 10/16, hospital course was complicated by leaking of fluid (?seroma) from crani site for which neurosurgery is following. On 10/17, hospital course further complicated by urinary retention, patient later able to void, and blood loss chronic anemia s/p 1u PRNC on 10/16.    #R SDH due to fall s/p Crani 10/8  #S/p MMA embolization 10/9  - intubated in ED for airway protection  - now comfortable on room air  - CTH stable  - patient noted to have an extracalvarial fluid collection s/p aspiration by neurosurgery, cultures NGTD  - NRSRGY: cw current mgmt, contact if csf leak  - diet per s/s  -PT/OT recommends rehab  - c/w neurochecks   - currently on keppra  - cw vit b6   - As per neuro patient can follow up outpatient to remove staples    #Right Wrist swelling - improved   -Patient is having swelling of the barrera wrist extending to the thenar eminence. It is tender to palpation  -VA duplex ordered: No evidence of right upper extremity deep venous thrombosis. Superficial   thrombophlebitis of the right cephalic vein.  -Xray of wrist wet read: soft tissue swelling    #UTI  #Hematuria  -Patient was found to have blood in her primafit  -UA was obtained however could be a dirty catch  -urine cx shows gram negative rods  -waiting for sensitivities   -Currently on zosyn started on 10/21    #HTN with hypertensive urgency on admission  - s/p nicardipine GTT  - BP controlled today  - cw Labetalol 200 q8H and amlodipine 10 mg daily   - patient on lisinopril at home, now being held due to hyperkalemia   - echo 10/9 - limited study, EF65-70%  - hold lasix for now  - BNP 1439    #MDS  - follows with Dr. Wade  - patient states she gets daily transfusions  - s/p 1 unit prbc  10/16/24 and 10/21  - monitor CBC, transfuse if Hb <7  - maintain active type & screen; latest 10/21- reordered for today    #MARCO on CKD III  - resolved, had transient urinary retention, patient able to void  - c/w bladder scans q6H  - holding bicarb 650 Q8H (home dose)        - DVT proph- heparin  - GI proph- pantoprazole  - Diet- east to chew  - Code Status- full code    pending:  monitor crani site, c/w neurochecks, cw zosyn,  UCx .

## 2024-10-25 NOTE — CHART NOTE - NSCHARTNOTEFT_GEN_A_CORE
Spoke to patients outpatient hematologist Dr. Wade regarding frequency of her Luparacept and she explained that it is something that can be held for 2 weeks as the patient will be going to [facility].

## 2024-10-25 NOTE — PROGRESS NOTE ADULT - SUBJECTIVE AND OBJECTIVE BOX
SIMONA KUHN  79y, Female  Allergy: No Known Allergies    Hospital Day: 17d    Patient seen and examined earlier today.  No acute events overnight.     PMH/PSH:  PAST MEDICAL & SURGICAL HISTORY:  DM (diabetes mellitus)      MDS (myelodysplastic syndrome)      H/O colonoscopy          LAST 24-Hr EVENTS:    VITALS:  T(F): 97.8 (10-25-24 @ 08:03), Max: 98.4 (10-24-24 @ 16:04)  HR: 64 (10-25-24 @ 08:03)  BP: 139/56 (10-25-24 @ 08:03) (139/56 - 154/73)  RR: 100 (10-25-24 @ 08:03)  SpO2: 96% (10-25-24 @ 00:38)          TESTS & MEASUREMENTS:  Weight/BMI      10-23-24 @ 07:01  -  10-24-24 @ 07:00  --------------------------------------------------------  IN: 0 mL / OUT: 200 mL / NET: -200 mL    10-24-24 @ 07:01  -  10-25-24 @ 07:00  --------------------------------------------------------  IN: 0 mL / OUT: 100 mL / NET: -100 mL                            8.3    7.38  )-----------( 291      ( 25 Oct 2024 06:11 )             25.7         10-25    142  |  112[H]  |  22[H]  ----------------------------<  86  4.0   |  19  |  1.3    Ca    8.9      25 Oct 2024 06:11  Mg     1.7     10-25              Culture - Urine (collected 10-21-24 @ 10:32)  Source: Catheterized Catheterized  Final Report (10-22-24 @ 17:43):    >=3 organisms. Probable collection contamination.    Culture - Urine (collected 10-20-24 @ 15:27)  Source: Clean Catch Clean Catch (Midstream)  Preliminary Report (10-21-24 @ 22:32):    >100,000 CFU/ml Gram Negative Rods      Urinalysis Basic - ( 25 Oct 2024 06:11 )    Color: x / Appearance: x / SG: x / pH: x  Gluc: 86 mg/dL / Ketone: x  / Bili: x / Urobili: x   Blood: x / Protein: x / Nitrite: x   Leuk Esterase: x / RBC: x / WBC x   Sq Epi: x / Non Sq Epi: x / Bacteria: x      Procalcitonin: 0.48 ng/mL (10-16-24 @ 13:31)      Ferritin: 4160 ng/mL (10-24-24 @ 11:14)            A1C with Estimated Average Glucose Result: 6.0 % (10-09-24 @ 02:40)  A1C with Estimated Average Glucose Result: 7.2 % (05-24-24 @ 07:40)          RADIOLOGY, ECG, & ADDITIONAL TESTS:  12 Lead ECG:   Systolic  mmHg    Diastolic BP 51 mmHg    Ventricular Rate 85 BPM    Atrial Rate 85 BPM    P-R Interval 164 ms    QRS Duration 76 ms    Q-T Interval 400 ms    QTC Calculation(Bazett) 476 ms    P Axis 77 degrees    R Axis 73 degrees    T Axis 74 degrees    Diagnosis Line Normal sinus rhythm  Normal ECG    Confirmed by Alexis Ho ([id number]) on 10/9/2024 9:22:32 AM (10-08-24 @ 19:27)    CT Head No Cont:   ACC: [id number] EXAM:  CT BRAIN   ORDERED BY: JOSE ANTONIO GANN     PROCEDURE DATE:  10/18/2024          INTERPRETATION:  CLINICAL INDICATION: Follow-up hemorrhage. Status post   craniotomy.    Technique: CT of the head was performed without contrast.    Multiple contiguous axial images were acquired from the skullbase to the   vertex without the administration of intravenous contrast.  Coronal and   sagittal reformations were made.    COMPARISON:  prior head CT dated 10/17/2024.    FINDINGS:  The patient is again noted to be status post right frontoparietal   craniotomy with multiple overlying skin staples.    There is no significant interval change in the right cerebral convexity   subdural hematoma measuring up to 1.2 cm in thickness, and the  predominantly low-density subdural collection over the left cerebral   convexity measuring up to 0.7 cm in thickness.    Redemonstration of multiple bilateral basal ganglia chronic lacunar   infarcts. There is cephalization noted in the right parietal lobe as well   as the right lateral temporal lobe which may reflect traumatic contusions.    The visualized intraorbital compartments, paranasal sinuses and mastoid   complexes appear free of acute disease.    IMPRESSION:  Stable examination in comparison to 10/17/2024.    Stable residual right hemispheric subdural hemorrhage status post   craniotomy for evacuation as well as low-density left hemispheric   subdural collection without midline shift.    --- End of Report ---            SAVANNAH GARCIA MD; Attending Radiologist  This document has been electronically signed. Oct 18 2024  8:38AM (10-18-24 @ 06:33)  CT Head No Cont:   ACC: [id number] EXAM:  CT BRAIN   ORDERED BY: ANN MARIE SILVA     PROCEDURE DATE:  10/17/2024          INTERPRETATION:  CLINICAL INDICATION: Subdural hemorrhage, follow-up.    TECHNIQUE: CT of the head was performed without the administration of   intravenous contrast.    COMPARISON: CT head 10/16/2024    FINDINGS:    The patient is again noted to be status post right frontoparietal   craniotomy with multiple overlying skin staples.    There is no significant interval change in the right cerebralconvexity   subdural hematoma measuring up to 12 mm in width, and the predominantly   low-density subdural collection over the left cerebral convexity   measuring up to 7 mm in width.    There is no significant midline shift.    Stable chronic basal ganglia lacunar infarcts.    There is mild scattered paranasal sinus mucosal thickening. The mastoids   are clear.    IMPRESSION:    1.  Stable exam since the CT head performed on the prior day.    2.  Right cerebral convexity subdural hemorrhage measuring up to 12 mm in   width.    3.  Left cerebral convexity predominantly low-density collection   measuring up to 7 mm in width.    4.  Right frontoparietal craniotomy changes.      --- End of Report ---          HEIDE RAY MD; Resident Radiologist  This document has been electronically signed.  LULY SHEARER MD; Attending Radiologist  This document has been electronically signed. Oct 17 2024  8:51AM (10-17-24 @ 05:41)    RECENT DIAGNOSTIC ORDERS:  CT Head No Cont: Urgent   Indication: r/o stroke  Transport: Stretcher-Crib,  w/ Monitor,  w/ Ventilator,  w/ IV Pole,  w/ O2 (10-25-24 @ 08:23)      MEDICATIONS:  MEDICATIONS  (STANDING):  amLODIPine   Tablet 10 milliGRAM(s) Oral daily  chlorhexidine 2% Cloths 1 Application(s) Topical <User Schedule>  dextrose 50% Injectable 50 milliLiter(s) IV Push every 15 minutes  dextrose 50% Injectable 25 milliLiter(s) IV Push every 15 minutes  dextrose 50% Injectable 25 milliLiter(s) IV Push every 15 minutes  heparin   Injectable 5000 Unit(s) SubCutaneous every 8 hours  insulin lispro (ADMELOG) corrective regimen sliding scale   SubCutaneous Before meals and at bedtime  labetalol 200 milliGRAM(s) Oral every 8 hours  levETIRAcetam 500 milliGRAM(s) Oral two times a day  magnesium sulfate  IVPB 2 Gram(s) IV Intermittent every 2 hours  pantoprazole    Tablet 40 milliGRAM(s) Oral before breakfast  polyethylene glycol 3350 17 Gram(s) Oral daily  pyridoxine 100 milliGRAM(s) Oral daily  senna 2 Tablet(s) Oral at bedtime    MEDICATIONS  (PRN):      HOME MEDICATIONS:  glimepiride 2 mg oral tablet (10-08)  Jardiance 10 mg oral tablet (10-08)  lisinopril 10 mg oral tablet (10-08)  NIFEdipine (Eqv-Procardia XL) 90 mg oral tablet, extended release (10-08)  pioglitazone 30 mg oral tablet (10-08)  SPS 15 GM/60 ML SUSPENSION (10-08)  Tradjenta 5 mg oral tablet (10-18)      PHYSICAL EXAM:  GENERAL: NAD  HEAD:  Atraumatic, Normocephalic  EYES: conjunctiva and sclera clear  NECK: Supple  CHEST/LUNG: Non labored respirations  HEART: regular rate and rhythm   ABDOMEN: Soft, Nondistended   EXTREMITIES:  No clubbing, cyanosis, or edema  PSYCH: AAOx3  NEUROLOGY: non-focal  SKIN: No rashes or lesions

## 2024-10-25 NOTE — PROGRESS NOTE ADULT - ASSESSMENT
79F w/ PMHx of MDS (follows w/ Dr Wade, requires periodic transfusions for anemia), DM, HTN, CKD BIBEMS after being found unresponsive on the floor. CTH in the ED with R hemispheric SDH 2.2cm w/ mass effect and 1.1cm MLS s/p crani 10/9 and MMA embolization. Patient was admitted to [facility]. Was eventually extubated and had some stridor, for which she completed steroids. On 10/16, hospital course was complicated by leaking of fluid (?seroma) from crani site for which neurosurgery is following. On 10/17, hospital course further complicated by urinary retention, patient later able to void, and blood loss chronic anemia s/p 1u PRNC on 10/16.    #R SDH due to fall s/p Crani 10/8  #S/p MMA embolization 10/9  - intubated in ED for airway protection  - now comfortable on room air  - CTH stable  - patient noted to have an extracalvarial fluid collection s/p aspiration by neurosurgery, cultures NGTD  - NRSRGY: cw current mgmt, contact if csf leak  - diet per s/s  -PT/OT recommends rehab  - c/w neurochecks   - currently on keppra  -  vit b6   - As per neuro patient can follow up outpatient to remove staples    #Right Wrist swelling  -Patient is having swelling of the barrera wrist extending to the thenar eminence. It is tender to palpation  -[facility] duplex ordered to r/o any clot  -Xray of wrist wet read: soft tissue swelling    #UTI  #Hematuria  -Patient was found to have blood in her primafit  -UA was obtained however could be a dirty catch  -urine cx shows gram negative rods  -waiting for sensitivities   -Currently on zosyn started on 10/21    #HTN with hypertensive urgency on admission  - s/p nicardipine GTT  - BP controlled today  - cw Labetalol 200 q8H and amlodipine 10 mg daily   - patient on lisinopril at home, now being held due to hyperkalemia   - echo 10/9 - limited study, EF65-70%  - hold lasix for now  - BNP 1439    #MDS  - follows with Dr. Wade  - patient states she gets daily transfusions  - s/p 1 unit prbc  10/16/24 and 10/21  - monitor CBC, transfuse if Hb <7  - maintain active type & screen; latest 10/21- reordered for today    #MARCO on CKD III  - resolved, had transient urinary retention, patient able to void  - c/w bladder scans q6H  - holding bicarb 650 Q8H (home dose)      General:  - DVT proph- heparin  - GI proph- pantoprazole  - Diet- east to chew  - Activity- out of bed to chair  - Code Status- full code    In progress:  monitor crani site, c/w neurochecks, cw zosyn, f/u duplex  79F w/ PMHx of MDS (follows w/ Dr Wade, requires periodic transfusions for anemia), DM, HTN, CKD BIBEMS after being found unresponsive on the floor. CTH in the ED with R hemispheric SDH 2.2cm w/ mass effect and 1.1cm MLS s/p crani 10/9 and MMA embolization. Patient was admitted to [facility]U. Was eventually extubated and had some stridor, for which she completed steroids. On 10/16, hospital course was complicated by leaking of fluid (?seroma) from crani site for which neurosurgery is following. On 10/17, hospital course further complicated by urinary retention, patient later able to void, and blood loss chronic anemia s/p 1u PRNC on 10/16.    #R SDH due to fall s/p Crani 10/8  #S/p MMA embolization 10/9  - intubated in ED for airway protection  - now comfortable on room air  - CTH stable  - patient noted to have an extracalvarial fluid collection s/p aspiration by neurosurgery, cultures NGTD  - NRSRGY: cw current mgmt, contact if csf leak  - diet per s/s  -PT/OT recommends rehab  - c/w neurochecks   - currently on keppra  - cw vit b6   - As per neuro patient can follow up outpatient to remove staples  - Patient was complaining of headache. Neuro exam was unremarkable other than tracking. CTH ordered    #Right Wrist swelling  -Patient initially was having swelling of the barrera wrist extending to the thenar eminence. It is tender to palpation. However on 10/25 the swelling has decreased and no longer tender or warm to touch  -VA duplex performed   -Xray of wrist wet read: soft tissue swelling    #UTI  #Hematuria  -Patient was found to have blood in her primafit  -UA was obtained however could be a dirty catch  -urine cx shows gram negative rods  -waiting for sensitivities   -Currently on zosyn started on 10/21    #HTN with hypertensive urgency on admission  - s/p nicardipine GTT  - BP controlled today  - cw Labetalol 200 q8H and amlodipine 10 mg daily   - patient on lisinopril at home, now being held due to hyperkalemia   - echo 10/9 - limited study, EF65-70%  - hold lasix for now  - BNP 1439    #MDS  - follows with Dr. Wade  - patient states she gets daily transfusions  - s/p 1 unit prbc  10/16/24 and 10/21  - monitor CBC, transfuse if Hb <7  - maintain active type & screen; latest 10/24    #MARCO on CKD III  - resolved, had transient urinary retention, patient able to void  - c/w bladder scans q6H  - holding bicarb 650 Q8H (home dose)      General:  - DVT proph- heparin  - GI proph- pantoprazole  - Diet- east to chew  - Activity- out of bed to chair  - Code Status- full code    In progress:  monitor crani site, c/w neurochecks, cw zosyn, f/u duplex, f/u CTH

## 2024-10-25 NOTE — PROGRESS NOTE ADULT - SUBJECTIVE AND OBJECTIVE BOX
SUBJECTIVE/OVERNIGHT EVENTS  Today is hospital day 17d. This morning patient was seen and examined at bedside, resting comfortably in bed. No acute or major events overnight. Patient was complaining of left sided headache this morning. AOx4. Denied any nausea, vomiting, abdominal pain, SOB, chest pain. Patient states that her right wrist has improved and decreased in swelling and tenderness.      Traumatic subdural hemorrhage without loss of consciousness, initial encounter    NOMD    NOMD    NOMD    NOMD    NOMD    Abnormal finding of blood chemistry, unspecified    Acute kidney failure, unspecified    Anemia, unspecified    Chronic cough    Chronic kidney disease, unspecified    Cough, unspecified    Cutaneous abscess of back [any part, except buttock]    Cutaneous abscess of limb, unspecified    Deficiency of other specified B group vitamins    Diarrhea, unspecified    Encounter for antineoplastic chemotherapy    Essential (primary) hypertension    FALL (ON) (FROM) UNSPECIFIED STAIRS AND STEPS, INIT ENCNTR    Fever, unspecified    History of falling    Hypomagnesemia    Long term (current) use of oral hypoglycemic drugs    Myelodysplastic syndrome, unspecified    Other drug-induced agranulocytosis    Other long term (current) drug therapy    Other specified disorders of nose and nasal sinuses    Pain in right knee    Pain in unspecified knee    Personal history of diseases of the blood and blood-forming organs and certain disorders involving the immune mechanism    Personal history of other endocrine, nutritional and metabolic disease    Personal history of other medical treatment    Personal history of other specified conditions    Pneumonia, unspecified organism    Shortness of breath    Synovial cyst of popliteal space [Rios], right knee    Thrombocytopenia, unspecified    Thrombocytosis, unspecified    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 disease    Unspecified abdominal pain    Unspecified fall, initial encounter    Unspecified place or not applicable    Chronic kidney disease, stage 3b    Disorder of kidney and ureter, unspecified    Irritable bowel syndrome with diarrhea    No pertinent family history in first degree relatives    No pertinent family history in first degree relatives    No pertinent family history in first degree relatives    Handoff    MEWS Score    DM (diabetes mellitus)    MDS (myelodysplastic syndrome)    Traumatic subdural hemorrhage with brief coma    Craniotomy, emergent, for subdural hematoma evacuation    Embolization of cerebral vessel    No significant past surgical history    H/O colonoscopy    FALL HIT HEAD    90+    SysAdmin_VstLnk        MEDICATIONS  STANDING MEDICATIONS  amLODIPine   Tablet 10 milliGRAM(s) Oral daily  chlorhexidine 2% Cloths 1 Application(s) Topical <User Schedule>  dextrose 50% Injectable 25 milliLiter(s) IV Push every 15 minutes  dextrose 50% Injectable 50 milliLiter(s) IV Push every 15 minutes  dextrose 50% Injectable 25 milliLiter(s) IV Push every 15 minutes  heparin   Injectable 5000 Unit(s) SubCutaneous every 8 hours  insulin lispro (ADMELOG) corrective regimen sliding scale   SubCutaneous Before meals and at bedtime  labetalol 200 milliGRAM(s) Oral every 8 hours  levETIRAcetam 500 milliGRAM(s) Oral two times a day  magnesium sulfate  IVPB 2 Gram(s) IV Intermittent every 2 hours  pantoprazole    Tablet 40 milliGRAM(s) Oral before breakfast  polyethylene glycol 3350 17 Gram(s) Oral daily  pyridoxine 100 milliGRAM(s) Oral daily  senna 2 Tablet(s) Oral at bedtime    PRN MEDICATIONS    VITALS  T(F): 97.8 (10-25-24 @ 08:03), Max: 98.4 (10-24-24 @ 16:04)  HR: 64 (10-25-24 @ 08:03) (63 - 68)  BP: 139/56 (10-25-24 @ 08:03) (139/56 - 154/73)  RR: 100 (10-25-24 @ 08:03) (18 - 100)  SpO2: 96% (10-25-24 @ 00:38) (96% - 96%)  POCT Blood Glucose.: 97 mg/dL (10-25-24 @ 07:44)  POCT Blood Glucose.: 205 mg/dL (10-24-24 @ 20:43)  POCT Blood Glucose.: 94 mg/dL (10-24-24 @ 17:35)    PHYSICAL EXAM  GENERAL  (  ) NAD, lying in bed comfortably     (  ) obtunded     (  ) lethargic     (  ) somnolent    HEAD  (  ) Atraumatic     (  ) hematoma     (  ) laceration (specify location:       )     NECK  (  ) Supple     (  ) neck stiffness     (  ) nuchal rigidity     (  )  no JVD     (  ) JVD present ( -- cm)    HEART  Rate -->  (  ) normal rate    (  ) bradycardic    (  ) tachycardic  Rhythm -->  (  ) regular    (  ) regularly irregular    (  ) irregularly irregular  Murmurs -->  (  ) normal s1/s2    (  ) systolic murmur    (  ) diastolic murmur    (  ) continuous murmur     (  ) S3 present    (  ) S4 present    LUNGS  (  )Unlabored respirations     (  ) tachypnea  (  ) B/L air entry     (  ) decreased breath sounds in:  (location     )    (  ) no adventitious sound     (  ) crackles     (  ) wheezing      (  ) rhonchi      (specify location:       )  (  ) chest wall tenderness (specify location:       )    ABDOMEN  (  ) Soft     (  ) tense   |   (  ) nondistended     (  ) distended   |   (  ) +BS     (  ) hypoactive bowel sounds     (  ) hyperactive bowel sounds  (  ) nontender     (  ) RUQ tenderness     (  ) RLQ tenderness     (  ) LLQ tenderness     (  ) epigastric tenderness     (  ) diffuse tenderness  (  ) Splenomegaly      (  ) Hepatomegaly      (  ) Jaundice     (  ) ecchymosis     EXTREMITIES  (  ) Normal     (  ) Rash     (  ) ecchymosis     (  ) varicose veins      (  ) pitting edema     (  ) non-pitting edema   (  ) ulceration     (  ) gangrene:     (location:     )    NERVOUS SYSTEM  (  ) A&Ox3     (  ) confused     (  ) lethargic  CN II-XII:     (  ) Intact     (  ) focal deficits  (Specify:     )   Upper extremities:     (  ) strength X/5     (  ) focal deficit (specify:    )  Lower extremities:     (  ) strength  X/5    (  ) focal deficit (specify:    )    SKIN  (  ) No rashes or lesions     (  ) maculopapular rash     (  ) pustules     (  ) vesicles     (  ) ulcer     (  ) ecchymosis     (specify location:     )    (  ) Indwelling Small Catheter   Date insterted:    Reason (  ) Critical illness     (  ) urinary retention    (  ) Accurate Ins/Outs Monitoring     (  ) CMO patient    (  ) Central Line  Date inserted:  Location: (  ) Right IJ   (  ) Left IJ   (  ) Right Fem   (  ) Left Fem    (  ) SPC  (  ) pigtail  (  ) PEG tube  (  ) colostomy  (  ) jejunostomy  (  ) U-Dall    LABS             8.3    7.38  )-----------( 291      ( 10-25-24 @ 06:11 )             25.7     142  |  112  |  22  -------------------------<  86   10-25-24 @ 06:11  4.0  |  19  |  1.3    Ca      8.9     10-25-24 @ 06:11  Mg     1.7     10-25-24 @ 06:11          Urinalysis Basic - ( 25 Oct 2024 06:11 )    Color: x / Appearance: x / SG: x / pH: x  Gluc: 86 mg/dL / Ketone: x  / Bili: x / Urobili: x   Blood: x / Protein: x / Nitrite: x   Leuk Esterase: x / RBC: x / WBC x   Sq Epi: x / Non Sq Epi: x / Bacteria: x          IMAGING    General:  - DVT proph  - GI proph  - Diet  - Activity  - Code Status     SUBJECTIVE/OVERNIGHT EVENTS  Today is hospital day 17d. This morning patient was seen and examined at bedside, resting comfortably in bed. No acute or major events overnight. Patient was complaining of left sided headache this morning. AOx4. Denied any nausea, vomiting, abdominal pain, SOB, chest pain. Patient states that her right wrist has improved and decreased in swelling and tenderness.      Traumatic subdural hemorrhage without loss of consciousness, initial encounter    NOMD    NOMD    NOMD    NOMD    NOMD    Abnormal finding of blood chemistry, unspecified    Acute kidney failure, unspecified    Anemia, unspecified    Chronic cough    Chronic kidney disease, unspecified    Cough, unspecified    Cutaneous abscess of back [any part, except buttock]    Cutaneous abscess of limb, unspecified    Deficiency of other specified B group vitamins    Diarrhea, unspecified    Encounter for antineoplastic chemotherapy    Essential (primary) hypertension    FALL (ON) (FROM) UNSPECIFIED STAIRS AND STEPS, INIT ENCNTR    Fever, unspecified    History of falling    Hypomagnesemia    Long term (current) use of oral hypoglycemic drugs    Myelodysplastic syndrome, unspecified    Other drug-induced agranulocytosis    Other long term (current) drug therapy    Other specified disorders of nose and nasal sinuses    Pain in right knee    Pain in unspecified knee    Personal history of diseases of the blood and blood-forming organs and certain disorders involving the immune mechanism    Personal history of other endocrine, nutritional and metabolic disease    Personal history of other medical treatment    Personal history of other specified conditions    Pneumonia, unspecified organism    Shortness of breath    Synovial cyst of popliteal space [Rios], right knee    Thrombocytopenia, unspecified    Thrombocytosis, unspecified    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 disease    Unspecified abdominal pain    Unspecified fall, initial encounter    Unspecified place or not applicable    Chronic kidney disease, stage 3b    Disorder of kidney and ureter, unspecified    Irritable bowel syndrome with diarrhea    No pertinent family history in first degree relatives    No pertinent family history in first degree relatives    No pertinent family history in first degree relatives    Handoff    MEWS Score    DM (diabetes mellitus)    MDS (myelodysplastic syndrome)    Traumatic subdural hemorrhage with brief coma    Craniotomy, emergent, for subdural hematoma evacuation    Embolization of cerebral vessel    No significant past surgical history    H/O colonoscopy    FALL HIT HEAD    90+    SysAdmin_VstLnk        MEDICATIONS  STANDING MEDICATIONS  amLODIPine   Tablet 10 milliGRAM(s) Oral daily  chlorhexidine 2% Cloths 1 Application(s) Topical <User Schedule>  dextrose 50% Injectable 25 milliLiter(s) IV Push every 15 minutes  dextrose 50% Injectable 50 milliLiter(s) IV Push every 15 minutes  dextrose 50% Injectable 25 milliLiter(s) IV Push every 15 minutes  heparin   Injectable 5000 Unit(s) SubCutaneous every 8 hours  insulin lispro (ADMELOG) corrective regimen sliding scale   SubCutaneous Before meals and at bedtime  labetalol 200 milliGRAM(s) Oral every 8 hours  levETIRAcetam 500 milliGRAM(s) Oral two times a day  magnesium sulfate  IVPB 2 Gram(s) IV Intermittent every 2 hours  pantoprazole    Tablet 40 milliGRAM(s) Oral before breakfast  polyethylene glycol 3350 17 Gram(s) Oral daily  pyridoxine 100 milliGRAM(s) Oral daily  senna 2 Tablet(s) Oral at bedtime    PRN MEDICATIONS    VITALS  T(F): 97.8 (10-25-24 @ 08:03), Max: 98.4 (10-24-24 @ 16:04)  HR: 64 (10-25-24 @ 08:03) (63 - 68)  BP: 139/56 (10-25-24 @ 08:03) (139/56 - 154/73)  RR: 100 (10-25-24 @ 08:03) (18 - 100)  SpO2: 96% (10-25-24 @ 00:38) (96% - 96%)  POCT Blood Glucose.: 97 mg/dL (10-25-24 @ 07:44)  POCT Blood Glucose.: 205 mg/dL (10-24-24 @ 20:43)  POCT Blood Glucose.: 94 mg/dL (10-24-24 @ 17:35)    PHYSICAL EXAM  GENERAL  ( x ) NAD, lying in bed comfortably     (  ) obtunded     (  ) lethargic     (  ) somnolent    HEAD  (  ) Atraumatic ( x ) staples around the crani sit non erythematous non ttp    (  ) hematoma     (  ) laceration (specify location:       )     HEART  Rate -->  ( x ) normal rate    (  ) bradycardic    (  ) tachycardic  Rhythm -->  ( x ) regular    (  ) regularly irregular    (  ) irregularly irregular  Murmurs -->  (  ) normal s1/s2    (  ) systolic murmur    (  ) diastolic murmur    (  ) continuous murmur     (  ) S3 present    (  ) S4 present    LUNGS  ( x )Unlabored respirations     (  ) tachypnea  (  ) B/L air entry     (  ) decreased breath sounds in:  (location     )    (  ) no adventitious sound     (  ) crackles     (  ) wheezing      (  ) rhonchi      (specify location:       )  (  ) chest wall tenderness (specify location:       )    ABDOMEN  ( x ) Soft     (  ) tense   |   (x  ) nondistended     (  ) distended   |   (  ) +BS     (  ) hypoactive bowel sounds     (  ) hyperactive bowel sounds  ( x ) nontender     (  ) RUQ tenderness     (  ) RLQ tenderness     (  ) LLQ tenderness     (  ) epigastric tenderness     (  ) diffuse tenderness  (  ) Splenomegaly      (  ) Hepatomegaly      (  ) Jaundice     (  ) ecchymosis     EXTREMITIES  ( x ) Normal     (  ) Rash     (  ) ecchymosis     (  ) varicose veins      (  ) pitting edema     (  ) non-pitting edema   (  ) ulceration     (  ) gangrene:     (location:     )    NERVOUS SYSTEM  ( x ) A&Ox3     (  ) confused     (  ) lethargic  CN II-XII:     (  ) Intact     (  ) focal deficits  (Specify:     )   Upper extremities:     ( x ) strength 5/5     (  ) focal deficit (specify:    )  Lower extremities:     ( x ) strength  5/5    (  ) focal deficit (specify:    )  ( x ) waxes and wanes with tracking      LABS             8.3    7.38  )-----------( 291      ( 10-25-24 @ 06:11 )             25.7     142  |  112  |  22  -------------------------<  86   10-25-24 @ 06:11  4.0  |  19  |  1.3    Ca      8.9     10-25-24 @ 06:11  Mg     1.7     10-25-24 @ 06:11          Urinalysis Basic - ( 25 Oct 2024 06:11 )    Color: x / Appearance: x / SG: x / pH: x  Gluc: 86 mg/dL / Ketone: x  / Bili: x / Urobili: x   Blood: x / Protein: x / Nitrite: x   Leuk Esterase: x / RBC: x / WBC x   Sq Epi: x / Non Sq Epi: x / Bacteria: x          IMAGING

## 2024-10-26 LAB
ANION GAP SERPL CALC-SCNC: 9 MMOL/L — SIGNIFICANT CHANGE UP (ref 7–14)
BUN SERPL-MCNC: 22 MG/DL — HIGH (ref 10–20)
CALCIUM SERPL-MCNC: 9 MG/DL — SIGNIFICANT CHANGE UP (ref 8.4–10.5)
CHLORIDE SERPL-SCNC: 112 MMOL/L — HIGH (ref 98–110)
CO2 SERPL-SCNC: 21 MMOL/L — SIGNIFICANT CHANGE UP (ref 17–32)
CREAT SERPL-MCNC: 1.1 MG/DL — SIGNIFICANT CHANGE UP (ref 0.7–1.5)
EGFR: 51 ML/MIN/1.73M2 — LOW
GLUCOSE BLDC GLUCOMTR-MCNC: 114 MG/DL — HIGH (ref 70–99)
GLUCOSE BLDC GLUCOMTR-MCNC: 120 MG/DL — HIGH (ref 70–99)
GLUCOSE BLDC GLUCOMTR-MCNC: 162 MG/DL — HIGH (ref 70–99)
GLUCOSE BLDC GLUCOMTR-MCNC: 87 MG/DL — SIGNIFICANT CHANGE UP (ref 70–99)
GLUCOSE SERPL-MCNC: 122 MG/DL — HIGH (ref 70–99)
HCT VFR BLD CALC: 25.8 % — LOW (ref 37–47)
HGB BLD-MCNC: 8.5 G/DL — LOW (ref 12–16)
MAGNESIUM SERPL-MCNC: 2.3 MG/DL — SIGNIFICANT CHANGE UP (ref 1.8–2.4)
MCHC RBC-ENTMCNC: 30.4 PG — SIGNIFICANT CHANGE UP (ref 27–31)
MCHC RBC-ENTMCNC: 32.9 G/DL — SIGNIFICANT CHANGE UP (ref 32–37)
MCV RBC AUTO: 92.1 FL — SIGNIFICANT CHANGE UP (ref 81–99)
NRBC # BLD: 0 /100 WBCS — SIGNIFICANT CHANGE UP (ref 0–0)
PLATELET # BLD AUTO: 287 K/UL — SIGNIFICANT CHANGE UP (ref 130–400)
PMV BLD: 10.7 FL — HIGH (ref 7.4–10.4)
POTASSIUM SERPL-MCNC: 4.1 MMOL/L — SIGNIFICANT CHANGE UP (ref 3.5–5)
POTASSIUM SERPL-SCNC: 4.1 MMOL/L — SIGNIFICANT CHANGE UP (ref 3.5–5)
RBC # BLD: 2.8 M/UL — LOW (ref 4.2–5.4)
RBC # FLD: 15.7 % — HIGH (ref 11.5–14.5)
SODIUM SERPL-SCNC: 142 MMOL/L — SIGNIFICANT CHANGE UP (ref 135–146)
WBC # BLD: 7.6 K/UL — SIGNIFICANT CHANGE UP (ref 4.8–10.8)
WBC # FLD AUTO: 7.6 K/UL — SIGNIFICANT CHANGE UP (ref 4.8–10.8)

## 2024-10-26 PROCEDURE — 99232 SBSQ HOSP IP/OBS MODERATE 35: CPT

## 2024-10-26 RX ADMIN — LEVETIRACETAM 500 MILLIGRAM(S): 500 TABLET, FILM COATED ORAL at 17:31

## 2024-10-26 RX ADMIN — HEPARIN SODIUM 5000 UNIT(S): 10000 INJECTION INTRAVENOUS; SUBCUTANEOUS at 13:33

## 2024-10-26 RX ADMIN — POLYETHYLENE GLYCOL 3350 17 GRAM(S): 17 POWDER, FOR SOLUTION ORAL at 11:42

## 2024-10-26 RX ADMIN — Medication 2: at 17:00

## 2024-10-26 RX ADMIN — Medication 200 MILLIGRAM(S): at 13:33

## 2024-10-26 RX ADMIN — LEVETIRACETAM 500 MILLIGRAM(S): 500 TABLET, FILM COATED ORAL at 05:38

## 2024-10-26 RX ADMIN — HEPARIN SODIUM 5000 UNIT(S): 10000 INJECTION INTRAVENOUS; SUBCUTANEOUS at 21:17

## 2024-10-26 RX ADMIN — Medication 200 MILLIGRAM(S): at 05:38

## 2024-10-26 RX ADMIN — Medication 100 MILLIGRAM(S): at 11:40

## 2024-10-26 RX ADMIN — Medication 200 MILLIGRAM(S): at 21:17

## 2024-10-26 RX ADMIN — HEPARIN SODIUM 5000 UNIT(S): 10000 INJECTION INTRAVENOUS; SUBCUTANEOUS at 05:38

## 2024-10-26 RX ADMIN — CHLORHEXIDINE GLUCONATE 1 APPLICATION(S): 40 SOLUTION TOPICAL at 05:39

## 2024-10-26 RX ADMIN — PANTOPRAZOLE SODIUM 40 MILLIGRAM(S): 40 TABLET, DELAYED RELEASE ORAL at 05:38

## 2024-10-26 RX ADMIN — Medication 2 TABLET(S): at 21:17

## 2024-10-26 RX ADMIN — Medication 0: at 21:16

## 2024-10-26 RX ADMIN — Medication 10 MILLIGRAM(S): at 05:38

## 2024-10-26 NOTE — PROGRESS NOTE ADULT - ASSESSMENT
79F w/ PMHx of MDS (follows w/ Dr Wade, requires periodic transfusions for anemia), DM, HTN, CKD BIBEMS after being found unresponsive on the floor. CTH in the ED with R hemispheric SDH 2.2cm w/ mass effect and 1.1cm MLS s/p crani 10/9 and MMA embolization. Patient was admitted to [facility]. Was eventually extubated and had some stridor, for which she completed steroids. On 10/16, hospital course was complicated by leaking of fluid (?seroma) from crani site for which neurosurgery is following. On 10/17, hospital course further complicated by urinary retention, patient later able to void, and blood loss chronic anemia s/p 1u PRNC on 10/16.    #R SDH due to fall s/p Crani 10/8  #S/p MMA embolization 10/9  - intubated in ED for airway protection  - now comfortable on room air  - CTH stable  - patient noted to have an extracalvarial fluid collection s/p aspiration by neurosurgery, cultures NGTD  - NRSRGY: cw current mgmt, contact if csf leak  - diet per s/s  -PT/OT recommends rehab  - c/w neurochecks   - currently on keppra  - cw vit b6   - As per neuro patient can follow up outpatient to remove staples    #Right Wrist swelling - improved   -Patient is having swelling of the barrera wrist extending to the thenar eminence. It is tender to palpation  -VA duplex ordered: No evidence of right upper extremity deep venous thrombosis. Superficial   thrombophlebitis of the right cephalic vein.  -Xray of wrist wet read: soft tissue swelling    #UTI  #Hematuria  -Patient was found to have blood in her primafit  -UA was obtained however could be a dirty catch  -urine cx shows gram negative rods  -waiting for sensitivities   -Currently on zosyn started on 10/21 - to be completed tomorrow     #HTN with hypertensive urgency on admission  - s/p nicardipine GTT  - BP controlled today  - cw Labetalol 200 q8H and amlodipine 10 mg daily   - patient on lisinopril at home, now being held due to hyperkalemia   - echo 10/9 - limited study, EF65-70%  - hold lasix for now  - BNP 1439    #MDS  - follows with Dr. Wade  - patient states she gets daily transfusions  - s/p 1 unit prbc  10/16/24 and 10/21  - monitor CBC, transfuse if Hb <7  - maintain active type & screen; latest 10/21- reordered for today  pt has been receiving Luparacept as outpatient with Dr Wade.  Per Discussion with Dr. Wade despite being on the medication patient has been requiring transfusions.  can continue to monitor Hb weekly at NH if below 7 - needs transfusions  If family insisting on giving Luparacept - can give in hospital before discharge.  pt would be ok for 3 weeks.     #MARCO on CKD III  - resolved, had transient urinary retention, patient able to void  - c/w bladder scans q6H  - holding bicarb 650 Q8H (home dose)        - DVT proph- heparin  - GI proph- pantoprazole  - Diet- east to chew  - Code Status- full code    pending:  monitor crani site, c/w neurochecks, cw zosyn,  UCx .

## 2024-10-26 NOTE — PROGRESS NOTE ADULT - SUBJECTIVE AND OBJECTIVE BOX
SIMONA KUHN  79y, Female  Allergy: No Known Allergies    Hospital Day: 18d    Patient seen and examined earlier today.  No acute events overnight.     PMH/PSH:  PAST MEDICAL & SURGICAL HISTORY:  DM (diabetes mellitus)      MDS (myelodysplastic syndrome)      H/O colonoscopy          LAST 24-Hr EVENTS:    VITALS:  T(F): 98.2 (10-26-24 @ 09:29), Max: 98.5 (10-25-24 @ 16:01)  HR: 95 (10-26-24 @ 13:34)  BP: 151/60 (10-26-24 @ 13:34) (136/71 - 151/60)  RR: 18 (10-26-24 @ 09:29)  SpO2: 96% (10-26-24 @ 09:29)          TESTS & MEASUREMENTS:  Weight/BMI      10-24-24 @ 07:01  -  10-25-24 @ 07:00  --------------------------------------------------------  IN: 0 mL / OUT: 100 mL / NET: -100 mL    10-25-24 @ 07:01  -  10-26-24 @ 07:00  --------------------------------------------------------  IN: 0 mL / OUT: 600 mL / NET: -600 mL                            8.5    7.60  )-----------( 287      ( 26 Oct 2024 07:02 )             25.8         10-26    142  |  112[H]  |  22[H]  ----------------------------<  122[H]  4.1   |  21  |  1.1    Ca    9.0      26 Oct 2024 07:02  Mg     2.3     10-26              Culture - Urine (collected 10-21-24 @ 10:32)  Source: Catheterized Catheterized  Final Report (10-22-24 @ 17:43):    >=3 organisms. Probable collection contamination.    Culture - Urine (collected 10-20-24 @ 15:27)  Source: Clean Catch Clean Catch (Midstream)  Final Report (10-25-24 @ 16:37):    >=3 organisms. Probable collection contamination. Culture includes    >100,000 CFU/ml Gram Negative Rods      Urinalysis Basic - ( 26 Oct 2024 07:02 )    Color: x / Appearance: x / SG: x / pH: x  Gluc: 122 mg/dL / Ketone: x  / Bili: x / Urobili: x   Blood: x / Protein: x / Nitrite: x   Leuk Esterase: x / RBC: x / WBC x   Sq Epi: x / Non Sq Epi: x / Bacteria: x          Ferritin: 4160 ng/mL (10-24-24 @ 11:14)            A1C with Estimated Average Glucose Result: 6.0 % (10-09-24 @ 02:40)  A1C with Estimated Average Glucose Result: 7.2 % (05-24-24 @ 07:40)          RADIOLOGY, ECG, & ADDITIONAL TESTS:  12 Lead ECG:   Systolic  mmHg    Diastolic BP 51 mmHg    Ventricular Rate 85 BPM    Atrial Rate 85 BPM    P-R Interval 164 ms    QRS Duration 76 ms    Q-T Interval 400 ms    QTC Calculation(Bazett) 476 ms    P Axis 77 degrees    R Axis 73 degrees    T Axis 74 degrees    Diagnosis Line Normal sinus rhythm  Normal ECG    Confirmed by Alexis Ho ([id number]) on 10/9/2024 9:22:32 AM (10-08-24 @ 19:27)    CT Head No Cont:   ACC: [id number] EXAM:  CT BRAIN   ORDERED BY: RYLEE ALAN     PROCEDURE DATE:  10/25/2024          INTERPRETATION:  Clinical history: Rule out stroke    Technique: Noncontrast head CT.  Contiguous CT axial images of the head   from the base to the vertex with coronal and sagittal reformats.    Comparison/correlation: CT head 10/18/2024    FINDINGS:    The patient is again noted to be status post right frontoparietal   craniotomy with multiple overlying skin staples.    There has been interval increased size of a subcutaneous fluid collection   within the overlying scalp measuring up to 1.8 cm in width, previously   1.2 cm.    There is similar size/configuration of the right removal convexity   subdural hematoma measuring 1.4 cm.    There is stable trace subdural hematoma over the left frontal convexity   measuring up to 3 mm.    There is stable trace right-to-left midline shift.    Stable chronic lacunar infarcts within the basal ganglia.    No evidence of new large territory infarct.    There are scattered paranasal sinus and mastoid opacification with   air-fluid levels.    IMPRESSION:    1.  No evidence of acute large territory infarct. Since the CT head   10/18/2024:    2.  Redemonstrated right sided craniotomy.    3.  Increasingsubcutaneous scalp fluid collection overlying the   craniotomy measuring up to 1.8 cm in width, previously 1.2 cm.    4.  Similar size/configuration of the right cerebral convexity subdural   hematoma measuring 1.4 cm and the left frontal convexity subdural   hematoma measuring up to 0.3 cm    --- End of Report ---            LULY SHEARER MD; Attending Radiologist  This document has been electronically signed. Oct 25 2024  2:53PM (10-25-24 @ 14:19)  CT Head No Cont:   ACC: [id number] EXAM:  CT BRAIN   ORDERED BY: JOSE ANTONIO GANN     PROCEDURE DATE:  10/18/2024          INTERPRETATION:  CLINICAL INDICATION: Follow-up hemorrhage. Status post   craniotomy.    Technique: CT of the head was performed without contrast.    Multiple contiguous axial images were acquired from the skullbase to the   vertex without the administration of intravenous contrast.  Coronal and   sagittal reformations were made.    COMPARISON:  prior head CT dated 10/17/2024.    FINDINGS:  The patient is again noted to be status post right frontoparietal   craniotomy with multiple overlying skin staples.    There is no significant interval change in the right cerebral convexity   subdural hematoma measuring up to 1.2 cm in thickness, and the  predominantly low-density subdural collection over the left cerebral   convexity measuring up to 0.7 cm in thickness.    Redemonstration of multiple bilateral basal ganglia chronic lacunar   infarcts. There is cephalization noted in the right parietal lobe as well   as the right lateral temporal lobe which may reflect traumatic contusions.    The visualized intraorbital compartments, paranasal sinuses and mastoid   complexes appear free of acute disease.    IMPRESSION:  Stable examination in comparison to 10/17/2024.    Stable residual right hemispheric subdural hemorrhage status post   craniotomy for evacuation as well as low-density left hemispheric   subdural collection without midline shift.    --- End of Report ---            SAVANNAH GARCIA MD; Attending Radiologist  This document has been electronically signed. Oct 18 2024  8:38AM (10-18-24 @ 06:33)    RECENT DIAGNOSTIC ORDERS:      MEDICATIONS:  MEDICATIONS  (STANDING):  amLODIPine   Tablet 10 milliGRAM(s) Oral daily  chlorhexidine 2% Cloths 1 Application(s) Topical <User Schedule>  dextrose 50% Injectable 25 milliLiter(s) IV Push every 15 minutes  dextrose 50% Injectable 50 milliLiter(s) IV Push every 15 minutes  dextrose 50% Injectable 25 milliLiter(s) IV Push every 15 minutes  heparin   Injectable 5000 Unit(s) SubCutaneous every 8 hours  insulin lispro (ADMELOG) corrective regimen sliding scale   SubCutaneous Before meals and at bedtime  labetalol 200 milliGRAM(s) Oral every 8 hours  levETIRAcetam 500 milliGRAM(s) Oral two times a day  pantoprazole    Tablet 40 milliGRAM(s) Oral before breakfast  polyethylene glycol 3350 17 Gram(s) Oral daily  pyridoxine 100 milliGRAM(s) Oral daily  senna 2 Tablet(s) Oral at bedtime    MEDICATIONS  (PRN):      HOME MEDICATIONS:  glimepiride 2 mg oral tablet (10-08)  Jardiance 10 mg oral tablet (10-08)  lisinopril 10 mg oral tablet (10-08)  NIFEdipine (Eqv-Procardia XL) 90 mg oral tablet, extended release (10-08)  pioglitazone 30 mg oral tablet (10-08)  SPS 15 GM/60 ML SUSPENSION (10-08)  Tradjenta 5 mg oral tablet (10-18)      PHYSICAL EXAM:  GENERAL: NAD  HEAD:  Atraumatic, Normocephalic  EYES: conjunctiva and sclera clear  NECK: Supple  CHEST/LUNG: Non labored respirations  HEART: regular rate and rhythm   ABDOMEN: Soft, Nondistended   EXTREMITIES:  No clubbing, cyanosis, or edema  PSYCH: AAOx3  NEUROLOGY: non-focal  SKIN: No rashes or lesions

## 2024-10-27 VITALS — DIASTOLIC BLOOD PRESSURE: 60 MMHG | HEART RATE: 82 BPM | SYSTOLIC BLOOD PRESSURE: 145 MMHG

## 2024-10-27 LAB
GLUCOSE BLDC GLUCOMTR-MCNC: 110 MG/DL — HIGH (ref 70–99)
GLUCOSE BLDC GLUCOMTR-MCNC: 115 MG/DL — HIGH (ref 70–99)

## 2024-10-27 PROCEDURE — 99239 HOSP IP/OBS DSCHRG MGMT >30: CPT

## 2024-10-27 RX ORDER — AMLODIPINE BESYLATE 10 MG
1 TABLET ORAL
Qty: 0 | Refills: 0 | DISCHARGE
Start: 2024-10-27

## 2024-10-27 RX ORDER — SENNA 187 MG
2 TABLET ORAL
Qty: 0 | Refills: 0 | DISCHARGE
Start: 2024-10-27

## 2024-10-27 RX ORDER — PANTOPRAZOLE SODIUM 40 MG/1
1 TABLET, DELAYED RELEASE ORAL
Qty: 0 | Refills: 0 | DISCHARGE
Start: 2024-10-27

## 2024-10-27 RX ORDER — LABETALOL HCL 200 MG
1 TABLET ORAL
Qty: 0 | Refills: 0 | DISCHARGE
Start: 2024-10-27

## 2024-10-27 RX ORDER — LEVETIRACETAM 500 MG/1
1 TABLET, FILM COATED ORAL
Qty: 0 | Refills: 0 | DISCHARGE
Start: 2024-10-27

## 2024-10-27 RX ORDER — POLYETHYLENE GLYCOL 3350 17 G/17G
17 POWDER, FOR SOLUTION ORAL
Qty: 0 | Refills: 0 | DISCHARGE
Start: 2024-10-27

## 2024-10-27 RX ADMIN — PANTOPRAZOLE SODIUM 40 MILLIGRAM(S): 40 TABLET, DELAYED RELEASE ORAL at 05:31

## 2024-10-27 RX ADMIN — Medication 10 MILLIGRAM(S): at 05:31

## 2024-10-27 RX ADMIN — Medication 200 MILLIGRAM(S): at 13:31

## 2024-10-27 RX ADMIN — HEPARIN SODIUM 5000 UNIT(S): 10000 INJECTION INTRAVENOUS; SUBCUTANEOUS at 05:30

## 2024-10-27 RX ADMIN — LEVETIRACETAM 500 MILLIGRAM(S): 500 TABLET, FILM COATED ORAL at 05:31

## 2024-10-27 RX ADMIN — Medication 100 MILLIGRAM(S): at 11:43

## 2024-10-27 RX ADMIN — HEPARIN SODIUM 5000 UNIT(S): 10000 INJECTION INTRAVENOUS; SUBCUTANEOUS at 13:30

## 2024-10-27 RX ADMIN — Medication 200 MILLIGRAM(S): at 05:31

## 2024-10-27 RX ADMIN — CHLORHEXIDINE GLUCONATE 1 APPLICATION(S): 40 SOLUTION TOPICAL at 05:31

## 2024-10-27 NOTE — PROGRESS NOTE ADULT - PROVIDER SPECIALTY LIST ADULT
Internal Medicine
NSICU
Neurology
Neurosurgery
Neurosurgery
Hospitalist
Hospitalist
Internal Medicine
NSICU
Neurosurgery
Heme/Onc
Internal Medicine
NSICU
NSICU
Neurosurgery
Neurosurgery
Internal Medicine
NSICU

## 2024-10-27 NOTE — DISCHARGE NOTE PROVIDER - CARE PROVIDER_API CALL
Harrison Cornell  Neurosurgery  [address] [place] [address], [address]  [place] [address]  Phone: [phone]  Fax: [phone]  Follow Up Time: 1-3 days    Kaveh Wade  Hematology  [address] [place] [id number]  Phone: [phone]  Fax: [phone]  Follow Up Time: 1 week

## 2024-10-27 NOTE — DISCHARGE NOTE NURSING/CASE MANAGEMENT/SOCIAL WORK - PATIENT PORTAL LINK FT
You can access the FollowMyHealth Patient Portal offered by [facility] by registering at the following website: [email or web address]. By joining [employer] [facility]’s FollowMyHealth portal, you will also be able to view your health information using other applications (apps) compatible with our system.

## 2024-10-27 NOTE — DISCHARGE NOTE PROVIDER - HOSPITAL COURSE
79F w/ PMHx of MDS (follows w/ Dr Wade, requires periodic transfusions for anemia), DM, HTN, CKD BIBEMS after being found unresponsive on the floor. CTH in the ED with R SDH s/p crani 10/9 and MMA embolization. Patient was admitted to NCCU. Was eventually extubated and had some stridor, for which she completed steroids. Hospital course complicated by leaking of fluid (?seroma) from crani site for which neurosurgery is following. Hospital course further complicate by urinary retention, patient later able to void, and blood loss anemia s/p 1u PRNC on 10/16, was downgraded to SDU. being managed on floor UTI - completed 7 day course of Abx.     79F w/ PMHx of MDS (follows w/ Dr Wade, requires periodic transfusions for anemia), DM, HTN, CKD BIBEMS after being found unresponsive on the floor. CTH in the ED with R hemispheric SDH 2.2cm w/ mass effect and 1.1cm MLS s/p crani 10/9 and MMA embolization. Patient was admitted to NCCU. Was eventually extubated and had some stridor, for which she completed steroids. On 10/16, hospital course was complicated by leaking of fluid (?seroma) from crani site for which neurosurgery is following. On 10/17, hospital course further complicated by urinary retention, patient later able to void, and blood loss chronic anemia s/p 1u PRNC on 10/16.    #R SDH due to fall s/p Crani 10/8  #S/p MMA embolization 10/9  - intubated in ED for airway protection  - now comfortable on room air  - CTH stable  - patient noted to have an extracalvarial fluid collection s/p aspiration by neurosurgery, cultures NGTD  - NRSRGY: cw current mgmt, contact if csf leak  - diet per s/s  -PT/OT recommends rehab  - c/w neurochecks   - currently on keppra  - cw vit b6   - As per neuro patient can follow up outpatient to remove staples on 10/30. Please call to make appointment with NSG. [facility] to arrange for transportation for patient to go to NSG appointment.      #Right Wrist swelling - resolved   -[facility] duplex ordered: No evidence of right upper extremity deep venous thrombosis. Superficial   thrombophlebitis of the right cephalic vein.  -Xray of wrist wet read: soft tissue swelling    #UTI  #Hematuria resolved  completed 7 day course of Abx     #HTN with hypertensive urgency on admission - improved   - s/p nicardipine GTT  - BP controlled today  - cw Labetalol 200 q8H and amlodipine 10 mg daily   - patient on lisinopril at home - will restart on discharge as Hyperkalemia and Cr improved  - echo 10/9 - limited study, EF65-70%  - restart lasix on discharge  - BNP 1439    #MDS  - follows with Dr. Wade  - patient states she gets daily transfusions  - s/p 1 unit prbc  10/16/24 and 10/21  - monitor CBC, transfuse if Hb <7  - maintain active type & screen; latest 10/21- reordered for today  pt has been receiving Luparacept as outpatient with Dr Wade.  Per Discussion with Dr. Wade despite being on the medication patient has been requiring transfusions.  can continue to monitor Hb weekly at NH if below 7 transfuse   Please have patient follow up with Hematology - Dr Wade      #MARCO on CKD III - resolved   - resolved, had transient urinary retention, patient able to void  - c/w bladder scans q6H  - holding bicarb 650 Q8H (home dose) 79F w/ PMHx of MDS (follows w/ Dr Wade, requires periodic transfusions for anemia), DM, HTN, CKD BIBEMS after being found unresponsive on the floor. CTH in the ED with R SDH s/p crani 10/9 and MMA embolization. Patient was admitted to NCCU. Was eventually extubated and had some stridor, for which she completed steroids. Hospital course complicated by leaking of fluid (?seroma) from crani site for which neurosurgery is following. Hospital course further complicate by urinary retention, patient later able to void, and blood loss anemia s/p 1u PRNC on 10/16, was downgraded to SDU. being managed on floor UTI - completed 7 day course of Abx.     79F w/ PMHx of MDS (follows w/ Dr Wade, requires periodic transfusions for anemia), DM, HTN, CKD BIBEMS after being found unresponsive on the floor. CTH in the ED with R hemispheric SDH 2.2cm w/ mass effect and 1.1cm MLS s/p crani 10/9 and MMA embolization. Patient was admitted to NCCU. Was eventually extubated and had some stridor, for which she completed steroids. On 10/16, hospital course was complicated by leaking of fluid (?seroma) from crani site for which neurosurgery is following. On 10/17, hospital course further complicated by urinary retention, patient later able to void, and blood loss chronic anemia s/p 1u PRNC on 10/16.    #R SDH due to fall s/p Crani 10/8  #S/p MMA embolization 10/9  - intubated in ED for airway protection  - now comfortable on room air  - CTH stable  - patient noted to have an extracalvarial fluid collection s/p aspiration by neurosurgery, cultures NGTD  - NRSRGY: cw current mgmt, contact if csf leak  - diet per s/s  -PT/OT recommends rehab  - c/w neurochecks   - currently on keppra  - cw vit b6   - As per neuro patient can follow up outpatient to remove staples on 10/30. Please call to make appointment with NSG. [facility] to arrange for transportation for patient to go to NSG appointment.      #Right Wrist swelling - resolved   -VA duplex ordered: No evidence of right upper extremity deep venous thrombosis. Superficial   thrombophlebitis of the right cephalic vein.  -Xray of wrist wet read: soft tissue swelling    #UTI  #Hematuria resolved  completed 7 day course of Abx     #HTN with hypertensive urgency on admission - improved   - s/p nicardipine GTT  - BP controlled today  - cw Labetalol 200 q8H and amlodipine 10 mg daily   - patient on lisinopril at home - will restart on discharge as Hyperkalemia and Cr improved  - echo 10/9 - limited study, EF65-70%  - restart lasix on discharge  - BNP 1439    #MDS  - follows with Dr. Wade  - patient states she gets daily transfusions  - s/p 1 unit prbc  10/16/24 and 10/21  - monitor CBC, transfuse if Hb <7  - maintain active type & screen; latest 10/21- reordered for today  pt has been receiving Luspatercept as outpatient with Dr Wade.  Per Discussion with Dr. Wade despite being on the medication patient has been requiring transfusions.  can continue to monitor Hb weekly at NH if below 7 transfuse   Please have patient follow up with Hematology - Dr Wade      #MARCO on CKD III - resolved   - resolved, had transient urinary retention, patient able to void  - c/w bladder scans q6H  - holding bicarb 650 Q8H (home dose)

## 2024-10-27 NOTE — PROGRESS NOTE ADULT - ASSESSMENT
79F w/ PMHx of MDS (follows w/ Dr Wade, requires periodic transfusions for anemia), DM, HTN, CKD BIBEMS after being found unresponsive on the floor. CTH in the ED with R hemispheric SDH 2.2cm w/ mass effect and 1.1cm MLS s/p crani 10/9 and MMA embolization. Patient was admitted to NCCU. Was eventually extubated and had some stridor, for which she completed steroids. On 10/16, hospital course was complicated by leaking of fluid (?seroma) from crani site for which neurosurgery is following. On 10/17, hospital course further complicated by urinary retention, patient later able to void, and blood loss chronic anemia s/p 1u PRNC on 10/16.    #R SDH due to fall s/p Crani 10/8  #S/p MMA embolization 10/9  - intubated in ED for airway protection  - now comfortable on room air  - CTH stable  - patient noted to have an extracalvarial fluid collection s/p aspiration by neurosurgery, cultures NGTD  - NRSRGY: cw current mgmt, contact if csf leak  - diet per s/s  -PT/OT recommends rehab  - c/w neurochecks   - currently on keppra  - cw vit b6   - As per neuro patient can follow up outpatient to remove staples  - Patient was complaining of headache. Neuro exam was unremarkable other than tracking. CTH ordered    #Right Wrist swelling  -Patient initially was having swelling of the barrera wrist extending to the thenar eminence. It is tender to palpation. However on 10/25 the swelling has decreased and no longer tender or warm to touch  -VA duplex performed   -Xray of wrist wet read: soft tissue swelling    #UTI  #Hematuria  -Patient was found to have blood in her primafit  -UA was obtained however could be a dirty catch  -urine cx shows gram negative rods  -waiting for sensitivities   -Currently on zosyn started on 10/21    #HTN with hypertensive urgency on admission  - s/p nicardipine GTT  - BP controlled today  - cw Labetalol 200 q8H and amlodipine 10 mg daily   - patient on lisinopril at home, now being held due to hyperkalemia   - echo 10/9 - limited study, EF65-70%  - hold lasix for now  - BNP 1439    #MDS  - follows with Dr. Wade  - patient states she gets daily transfusions  - s/p 1 unit prbc  10/16/24 and 10/21  - monitor CBC, transfuse if Hb <7  - maintain active type & screen; latest 10/24    #MARCO on CKD III  - resolved, had transient urinary retention, patient able to void  - c/w bladder scans q6H  - holding bicarb 650 Q8H (home dose)      General:  - DVT proph- heparin  - GI proph- pantoprazole  - Diet- east to chew  - Activity- out of bed to chair  - Code Status- full code    In progress:  monitor crani site, c/w neurochecks, cw zosyn, f/u duplex, f/u CTH

## 2024-10-27 NOTE — DISCHARGE NOTE PROVIDER - CARE PROVIDERS DIRECT ADDRESSES
[email or web address]

## 2024-10-27 NOTE — DISCHARGE NOTE PROVIDER - NSDCCPCAREPLAN_GEN_ALL_CORE_FT
PRINCIPAL DISCHARGE DIAGNOSIS  Diagnosis: Traumatic subdural hemorrhage with brief coma  Assessment and Plan of Treatment: You craniotomy with neurosurgery and middle meningeal artery embolization.    Please follow up with Neurosurgery to have your staples removed on 10/30      SECONDARY DISCHARGE DIAGNOSES  Diagnosis: Urinary tract infection  Assessment and Plan of Treatment: you had a urinary tract infection for you completed 7 day course of Antibiotics

## 2024-10-27 NOTE — DISCHARGE NOTE NURSING/CASE MANAGEMENT/SOCIAL WORK - NSDCPEFALRISK_GEN_ALL_CORE
For information on Fall & Injury Prevention, visit: [email or web address] OR  [email or web address] OR  https://www.cdc.gov/steadi/patient.html

## 2024-10-27 NOTE — DISCHARGE NOTE PROVIDER - NSDCPNSUBOBJ_GEN_ALL_CORE
Pt was seen and examined at the bedside. resting comfortably. Pt to follow with NSG on 10/30 to remove staples.  Pt to also follow up with Dr Wade to discuss Luspatercept and further management of MDS.

## 2024-10-27 NOTE — PROGRESS NOTE ADULT - SUBJECTIVE AND OBJECTIVE BOX
SUBJECTIVE/OVERNIGHT EVENTS  Today is hospital day 17d. This morning patient was seen and examined at bedside, resting comfortably in bed.     Traumatic subdural hemorrhage without loss of consciousness, initial encounter    NOMD    NOMD    NOMD    NOMD    NOMD    Abnormal finding of blood chemistry, unspecified    Acute kidney failure, unspecified    Anemia, unspecified    Chronic cough    Chronic kidney disease, unspecified    Cough, unspecified    Cutaneous abscess of back [any part, except buttock]    Cutaneous abscess of limb, unspecified    Deficiency of other specified B group vitamins    Diarrhea, unspecified    Encounter for antineoplastic chemotherapy    Essential (primary) hypertension    FALL (ON) (FROM) UNSPECIFIED STAIRS AND STEPS, INIT ENCNTR    Fever, unspecified    History of falling    Hypomagnesemia    Long term (current) use of oral hypoglycemic drugs    Myelodysplastic syndrome, unspecified    Other drug-induced agranulocytosis    Other long term (current) drug therapy    Other specified disorders of nose and nasal sinuses    Pain in right knee    Pain in unspecified knee    Personal history of diseases of the blood and blood-forming organs and certain disorders involving the immune mechanism    Personal history of other endocrine, nutritional and metabolic disease    Personal history of other medical treatment    Personal history of other specified conditions    Pneumonia, unspecified organism    Shortness of breath    Synovial cyst of popliteal space [Rios], right knee    Thrombocytopenia, unspecified    Thrombocytosis, unspecified    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 disease    Unspecified abdominal pain    Unspecified fall, initial encounter    Unspecified place or not applicable    Chronic kidney disease, stage 3b    Disorder of kidney and ureter, unspecified    Irritable bowel syndrome with diarrhea    No pertinent family history in first degree relatives    No pertinent family history in first degree relatives    No pertinent family history in first degree relatives    Handoff    MEWS Score    DM (diabetes mellitus)    MDS (myelodysplastic syndrome)    Traumatic subdural hemorrhage with brief coma    Craniotomy, emergent, for subdural hematoma evacuation    Embolization of cerebral vessel    No significant past surgical history    H/O colonoscopy    FALL HIT HEAD    90+    SysAdmin_VstLnk        MEDICATIONS  STANDING MEDICATIONS  amLODIPine   Tablet 10 milliGRAM(s) Oral daily  chlorhexidine 2% Cloths 1 Application(s) Topical <User Schedule>  dextrose 50% Injectable 25 milliLiter(s) IV Push every 15 minutes  dextrose 50% Injectable 50 milliLiter(s) IV Push every 15 minutes  dextrose 50% Injectable 25 milliLiter(s) IV Push every 15 minutes  heparin   Injectable 5000 Unit(s) SubCutaneous every 8 hours  insulin lispro (ADMELOG) corrective regimen sliding scale   SubCutaneous Before meals and at bedtime  labetalol 200 milliGRAM(s) Oral every 8 hours  levETIRAcetam 500 milliGRAM(s) Oral two times a day  magnesium sulfate  IVPB 2 Gram(s) IV Intermittent every 2 hours  pantoprazole    Tablet 40 milliGRAM(s) Oral before breakfast  polyethylene glycol 3350 17 Gram(s) Oral daily  pyridoxine 100 milliGRAM(s) Oral daily  senna 2 Tablet(s) Oral at bedtime    PRN MEDICATIONS    VITALS  T(F): 97.8 (10-25-24 @ 08:03), Max: 98.4 (10-24-24 @ 16:04)  HR: 64 (10-25-24 @ 08:03) (63 - 68)  BP: 139/56 (10-25-24 @ 08:03) (139/56 - 154/73)  RR: 100 (10-25-24 @ 08:03) (18 - 100)  SpO2: 96% (10-25-24 @ 00:38) (96% - 96%)  POCT Blood Glucose.: 97 mg/dL (10-25-24 @ 07:44)  POCT Blood Glucose.: 205 mg/dL (10-24-24 @ 20:43)  POCT Blood Glucose.: 94 mg/dL (10-24-24 @ 17:35)    PHYSICAL EXAM  GENERAL  ( x ) NAD, lying in bed comfortably     (  ) obtunded     (  ) lethargic     (  ) somnolent    HEAD  (  ) Atraumatic ( x ) staples around the crani sit non erythematous non ttp    (  ) hematoma     (  ) laceration (specify location:       )     HEART  Rate -->  ( x ) normal rate    (  ) bradycardic    (  ) tachycardic  Rhythm -->  ( x ) regular    (  ) regularly irregular    (  ) irregularly irregular  Murmurs -->  (  ) normal s1/s2    (  ) systolic murmur    (  ) diastolic murmur    (  ) continuous murmur     (  ) S3 present    (  ) S4 present    LUNGS  ( x )Unlabored respirations     (  ) tachypnea  (  ) B/L air entry     (  ) decreased breath sounds in:  (location     )    (  ) no adventitious sound     (  ) crackles     (  ) wheezing      (  ) rhonchi      (specify location:       )  (  ) chest wall tenderness (specify location:       )    ABDOMEN  ( x ) Soft     (  ) tense   |   (x  ) nondistended     (  ) distended   |   (  ) +BS     (  ) hypoactive bowel sounds     (  ) hyperactive bowel sounds  ( x ) nontender     (  ) RUQ tenderness     (  ) RLQ tenderness     (  ) LLQ tenderness     (  ) epigastric tenderness     (  ) diffuse tenderness  (  ) Splenomegaly      (  ) Hepatomegaly      (  ) Jaundice     (  ) ecchymosis     EXTREMITIES  ( x ) Normal     (  ) Rash     (  ) ecchymosis     (  ) varicose veins      (  ) pitting edema     (  ) non-pitting edema   (  ) ulceration     (  ) gangrene:     (location:     )    NERVOUS SYSTEM  ( x ) A&Ox3     (  ) confused     (  ) lethargic  CN II-XII:     (  ) Intact     (  ) focal deficits  (Specify:     )   Upper extremities:     ( x ) strength 5/5     (  ) focal deficit (specify:    )  Lower extremities:     ( x ) strength  5/5    (  ) focal deficit (specify:    )  ( x ) waxes and wanes with tracking      LABS             8.3    7.38  )-----------( 291      ( 10-25-24 @ 06:11 )             25.7     142  |  112  |  22  -------------------------<  86   10-25-24 @ 06:11  4.0  |  19  |  1.3    Ca      8.9     10-25-24 @ 06:11  Mg     1.7     10-25-24 @ 06:11          Urinalysis Basic - ( 25 Oct 2024 06:11 )    Color: x / Appearance: x / SG: x / pH: x  Gluc: 86 mg/dL / Ketone: x  / Bili: x / Urobili: x   Blood: x / Protein: x / Nitrite: x   Leuk Esterase: x / RBC: x / WBC x   Sq Epi: x / Non Sq Epi: x / Bacteria: x          IMAGING

## 2024-10-27 NOTE — DISCHARGE NOTE NURSING/CASE MANAGEMENT/SOCIAL WORK - FINANCIAL ASSISTANCE
[facility] provides services at a reduced cost to those who are determined to be eligible through [facility]’s financial assistance program. Information regarding [facility]’s financial assistance program can be found by going to [email or web address] or by calling [phone].

## 2024-10-27 NOTE — DISCHARGE NOTE PROVIDER - NSDCFUSCHEDAPPT_GEN_ALL_CORE_FT
Kaveh Wade  [facility] PreAdmits  Scheduled Appointment: 10/28/2024    [facility] Physician [place] [address] [address]  Scheduled Appointment: 10/28/2024    Kaveh Wade  [facility] PreAdmits  Scheduled Appointment: 11/12/2024    [facility] Healthcare Physician [place] [address] [address]  Scheduled Appointment: 11/12/2024

## 2024-10-27 NOTE — DISCHARGE NOTE PROVIDER - PROVIDER TOKENS
PROVIDER:[TOKEN:[[id number]],FOLLOWUP:[1-3 days]],PROVIDER:[TOKEN:[[id number]],FOLLOWUP:[1 week]]

## 2024-10-27 NOTE — DISCHARGE NOTE PROVIDER - NSDCMRMEDTOKEN_GEN_ALL_CORE_FT
amLODIPine 10 mg oral tablet: 1 tab(s) orally once a day  glimepiride 2 mg oral tablet: 1 tab(s) orally 2 times a day  Jardiance 10 mg oral tablet: 1 tab(s) orally once a day  labetalol 200 mg oral tablet: 1 tab(s) orally every 8 hours  levETIRAcetam 500 mg oral tablet: 1 tab(s) orally 2 times a day  lisinopril 10 mg oral tablet: 1 tab(s) orally once a day  pantoprazole 40 mg oral delayed release tablet: 1 tab(s) orally once a day (before a meal)  pioglitazone 30 mg oral tablet: 1 tab(s) orally once a day  polyethylene glycol 3350 oral powder for reconstitution: 17 gram(s) orally once a day  pyridoxine 100 mg oral tablet: 1 tab(s) orally once a day  senna leaf extract oral tablet: 2 tab(s) orally once a day (at bedtime)  SPS 15 GM/60 ML SUSPENSION: TAKE 15ML TWICE WEEKLY  Tradjenta 5 mg oral tablet: 1 tab(s) orally once a day

## 2024-10-27 NOTE — PROGRESS NOTE ADULT - REASON FOR ADMISSION
Patient is a 79y old  Female who presents with a chief complaint of unresponsiveness (24 Oct 2024 09:54)
Patient is a 79y old  Female who presents with a chief complaint of unresponsiveness (25 Oct 2024 11:06)
unresponsiveness
Traumatic subdural hemorrhage
unresponsiveness
unresponsiveness

## 2024-10-28 ENCOUNTER — APPOINTMENT (OUTPATIENT)
Dept: NEPHROLOGY | Facility: CLINIC | Age: 79
End: 2024-10-28

## 2024-10-28 ENCOUNTER — NON-APPOINTMENT (OUTPATIENT)
Age: 79
End: 2024-10-28

## 2024-10-28 ENCOUNTER — APPOINTMENT (OUTPATIENT)
Age: 79
End: 2024-10-28

## 2024-10-29 ENCOUNTER — APPOINTMENT (OUTPATIENT)
Dept: NEUROSURGERY | Facility: CLINIC | Age: 79
End: 2024-10-29
Payer: MEDICARE

## 2024-10-29 VITALS — HEIGHT: 62 IN | WEIGHT: 136 LBS | BODY MASS INDEX: 25.03 KG/M2

## 2024-10-29 DIAGNOSIS — Z98.890 OTHER SPECIFIED POSTPROCEDURAL STATES: ICD-10-CM

## 2024-10-29 PROCEDURE — 99024 POSTOP FOLLOW-UP VISIT: CPT

## 2024-10-31 ENCOUNTER — INPATIENT (INPATIENT)
Facility: HOSPITAL | Age: 79
LOS: 19 days | Discharge: HOME | DRG: 872 | End: 2024-11-20
Attending: STUDENT IN AN ORGANIZED HEALTH CARE EDUCATION/TRAINING PROGRAM | Admitting: NEUROLOGICAL SURGERY
Payer: MEDICARE

## 2024-10-31 VITALS
TEMPERATURE: 98 F | HEART RATE: 96 BPM | HEIGHT: 64 IN | DIASTOLIC BLOOD PRESSURE: 66 MMHG | OXYGEN SATURATION: 100 % | SYSTOLIC BLOOD PRESSURE: 196 MMHG | RESPIRATION RATE: 20 BRPM | WEIGHT: 134.92 LBS

## 2024-10-31 DIAGNOSIS — J81.1 CHRONIC PULMONARY EDEMA: ICD-10-CM

## 2024-10-31 DIAGNOSIS — D62 ACUTE POSTHEMORRHAGIC ANEMIA: ICD-10-CM

## 2024-10-31 DIAGNOSIS — J96.00 ACUTE RESPIRATORY FAILURE, UNSPECIFIED WHETHER WITH HYPOXIA OR HYPERCAPNIA: ICD-10-CM

## 2024-10-31 DIAGNOSIS — I12.9 HYPERTENSIVE CHRONIC KIDNEY DISEASE WITH STAGE 1 THROUGH STAGE 4 CHRONIC KIDNEY DISEASE, OR UNSPECIFIED CHRONIC KIDNEY DISEASE: ICD-10-CM

## 2024-10-31 DIAGNOSIS — D46.9 MYELODYSPLASTIC SYNDROME, UNSPECIFIED: ICD-10-CM

## 2024-10-31 DIAGNOSIS — R31.9 HEMATURIA, UNSPECIFIED: ICD-10-CM

## 2024-10-31 DIAGNOSIS — E83.39 OTHER DISORDERS OF PHOSPHORUS METABOLISM: ICD-10-CM

## 2024-10-31 DIAGNOSIS — S06.5X1A TRAUMATIC SUBDURAL HEMORRHAGE WITH LOSS OF CONSCIOUSNESS OF 30 MINUTES OR LESS, INITIAL ENCOUNTER: ICD-10-CM

## 2024-10-31 DIAGNOSIS — E87.8 OTHER DISORDERS OF ELECTROLYTE AND FLUID BALANCE, NOT ELSEWHERE CLASSIFIED: ICD-10-CM

## 2024-10-31 DIAGNOSIS — R40.2252 COMA SCALE, BEST VERBAL RESPONSE, ORIENTED, AT ARRIVAL TO EMERGENCY DEPARTMENT: ICD-10-CM

## 2024-10-31 DIAGNOSIS — G97.63 POSTPROCEDURAL SEROMA OF A NERVOUS SYSTEM ORGAN OR STRUCTURE FOLLOWING A NERVOUS SYSTEM PROCEDURE: ICD-10-CM

## 2024-10-31 DIAGNOSIS — N39.0 URINARY TRACT INFECTION, SITE NOT SPECIFIED: ICD-10-CM

## 2024-10-31 DIAGNOSIS — E87.20 ACIDOSIS, UNSPECIFIED: ICD-10-CM

## 2024-10-31 DIAGNOSIS — Z98.890 OTHER SPECIFIED POSTPROCEDURAL STATES: Chronic | ICD-10-CM

## 2024-10-31 DIAGNOSIS — E83.42 HYPOMAGNESEMIA: ICD-10-CM

## 2024-10-31 DIAGNOSIS — Z92.21 PERSONAL HISTORY OF ANTINEOPLASTIC CHEMOTHERAPY: ICD-10-CM

## 2024-10-31 DIAGNOSIS — E11.65 TYPE 2 DIABETES MELLITUS WITH HYPERGLYCEMIA: ICD-10-CM

## 2024-10-31 DIAGNOSIS — R40.2142 COMA SCALE, EYES OPEN, SPONTANEOUS, AT ARRIVAL TO EMERGENCY DEPARTMENT: ICD-10-CM

## 2024-10-31 DIAGNOSIS — R33.9 RETENTION OF URINE, UNSPECIFIED: ICD-10-CM
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
## 2024-10-31 DIAGNOSIS — A41.9 SEPSIS, UNSPECIFIED ORGANISM: ICD-10-CM

## 2024-10-31 DIAGNOSIS — R45.1 RESTLESSNESS AND AGITATION: ICD-10-CM

## 2024-10-31 DIAGNOSIS — N18.32 CHRONIC KIDNEY DISEASE, STAGE 3B: ICD-10-CM

## 2024-10-31 DIAGNOSIS — I16.0 HYPERTENSIVE URGENCY: ICD-10-CM

## 2024-10-31 DIAGNOSIS — S06.A0XA TRAUMATIC BRAIN COMPRESSION WITHOUT HERNIATION, INITIAL ENCOUNTER: ICD-10-CM

## 2024-10-31 DIAGNOSIS — E87.5 HYPERKALEMIA: ICD-10-CM

## 2024-10-31 DIAGNOSIS — S06.5X9A TRAUMATIC SUBDURAL HEMORRHAGE WITH LOSS OF CONSCIOUSNESS OF UNSPECIFIED DURATION, INITIAL ENCOUNTER: ICD-10-CM
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
## 2024-10-31 DIAGNOSIS — G97.82 OTHER POSTPROCEDURAL COMPLICATIONS AND DISORDERS OF NERVOUS SYSTEM: ICD-10-CM
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
## 2024-10-31 DIAGNOSIS — I80.8 PHLEBITIS AND THROMBOPHLEBITIS OF OTHER SITES: ICD-10-CM

## 2024-10-31 DIAGNOSIS — Z79.84 LONG TERM (CURRENT) USE OF ORAL HYPOGLYCEMIC DRUGS: ICD-10-CM

## 2024-10-31 DIAGNOSIS — R40.2362 COMA SCALE, BEST MOTOR RESPONSE, OBEYS COMMANDS, AT ARRIVAL TO EMERGENCY DEPARTMENT: ICD-10-CM

## 2024-10-31 DIAGNOSIS — E11.22 TYPE 2 DIABETES MELLITUS WITH DIABETIC CHRONIC KIDNEY DISEASE: ICD-10-CM

## 2024-10-31 DIAGNOSIS — N17.9 ACUTE KIDNEY FAILURE, UNSPECIFIED: ICD-10-CM

## 2024-10-31 LAB
ALBUMIN SERPL ELPH-MCNC: 3.5 G/DL — SIGNIFICANT CHANGE UP (ref 3.5–5.2)
ALP SERPL-CCNC: 88 U/L — SIGNIFICANT CHANGE UP (ref 30–115)
ALT FLD-CCNC: 32 U/L — SIGNIFICANT CHANGE UP (ref 0–41)
ANION GAP SERPL CALC-SCNC: 15 MMOL/L — HIGH (ref 7–14)
APTT BLD: 26.4 SEC — LOW (ref 27–39.2)
AST SERPL-CCNC: 33 U/L — SIGNIFICANT CHANGE UP (ref 0–41)
BASE EXCESS BLDV CALC-SCNC: -3 MMOL/L — LOW (ref -2–3)
BASOPHILS # BLD AUTO: 0.09 K/UL — SIGNIFICANT CHANGE UP (ref 0–0.2)
BASOPHILS NFR BLD AUTO: 0.5 % — SIGNIFICANT CHANGE UP (ref 0–1)
BILIRUB SERPL-MCNC: 0.7 MG/DL — SIGNIFICANT CHANGE UP (ref 0.2–1.2)
BUN SERPL-MCNC: 31 MG/DL — HIGH (ref 10–20)
CA-I SERPL-SCNC: 1.24 MMOL/L — SIGNIFICANT CHANGE UP (ref 1.15–1.33)
CALCIUM SERPL-MCNC: 8.4 MG/DL — SIGNIFICANT CHANGE UP (ref 8.4–10.5)
CHLORIDE SERPL-SCNC: 113 MMOL/L — HIGH (ref 98–110)
CO2 SERPL-SCNC: 20 MMOL/L — SIGNIFICANT CHANGE UP (ref 17–32)
CREAT SERPL-MCNC: 1.4 MG/DL — SIGNIFICANT CHANGE UP (ref 0.7–1.5)
EGFR: 39 ML/MIN/1.73M2 — LOW
EOSINOPHIL # BLD AUTO: 0.11 K/UL — SIGNIFICANT CHANGE UP (ref 0–0.7)
EOSINOPHIL NFR BLD AUTO: 0.7 % — SIGNIFICANT CHANGE UP (ref 0–8)
FLUAV AG NPH QL: SIGNIFICANT CHANGE UP
FLUBV AG NPH QL: SIGNIFICANT CHANGE UP
GAS PNL BLDV: 140 MMOL/L — SIGNIFICANT CHANGE UP (ref 136–145)
GAS PNL BLDV: SIGNIFICANT CHANGE UP
GAS PNL BLDV: SIGNIFICANT CHANGE UP
GLUCOSE BLDC GLUCOMTR-MCNC: 72 MG/DL — SIGNIFICANT CHANGE UP (ref 70–99)
GLUCOSE SERPL-MCNC: 99 MG/DL — SIGNIFICANT CHANGE UP (ref 70–99)
HCO3 BLDV-SCNC: 22 MMOL/L — SIGNIFICANT CHANGE UP (ref 22–29)
HCT VFR BLD CALC: 23.2 % — LOW (ref 37–47)
HCT VFR BLDA CALC: 24 % — LOW (ref 34.5–46.5)
HGB BLD CALC-MCNC: 8 G/DL — LOW (ref 11.7–16.1)
HGB BLD-MCNC: 7.6 G/DL — LOW (ref 12–16)
IMM GRANULOCYTES NFR BLD AUTO: 1.4 % — HIGH (ref 0.1–0.3)
INR BLD: 1.07 RATIO — SIGNIFICANT CHANGE UP (ref 0.65–1.3)
LACTATE BLDV-MCNC: 1 MMOL/L — SIGNIFICANT CHANGE UP (ref 0.5–2)
LACTATE SERPL-SCNC: 0.9 MMOL/L — SIGNIFICANT CHANGE UP (ref 0.7–2)
LYMPHOCYTES # BLD AUTO: 1.29 K/UL — SIGNIFICANT CHANGE UP (ref 1.2–3.4)
LYMPHOCYTES # BLD AUTO: 7.8 % — LOW (ref 20.5–51.1)
MCHC RBC-ENTMCNC: 29.8 PG — SIGNIFICANT CHANGE UP (ref 27–31)
MCHC RBC-ENTMCNC: 32.8 G/DL — SIGNIFICANT CHANGE UP (ref 32–37)
MCV RBC AUTO: 91 FL — SIGNIFICANT CHANGE UP (ref 81–99)
MONOCYTES # BLD AUTO: 1.15 K/UL — HIGH (ref 0.1–0.6)
MONOCYTES NFR BLD AUTO: 7 % — SIGNIFICANT CHANGE UP (ref 1.7–9.3)
NEUTROPHILS # BLD AUTO: 13.63 K/UL — HIGH (ref 1.4–6.5)
NEUTROPHILS NFR BLD AUTO: 82.6 % — HIGH (ref 42.2–75.2)
NRBC # BLD: 0 /100 WBCS — SIGNIFICANT CHANGE UP (ref 0–0)
PCO2 BLDV: 38 MMHG — LOW (ref 39–42)
PH BLDV: 7.37 — SIGNIFICANT CHANGE UP (ref 7.32–7.43)
PLATELET # BLD AUTO: 225 K/UL — SIGNIFICANT CHANGE UP (ref 130–400)
PMV BLD: 10.4 FL — SIGNIFICANT CHANGE UP (ref 7.4–10.4)
PO2 BLDV: 56 MMHG — HIGH (ref 25–45)
POTASSIUM BLDV-SCNC: 3.6 MMOL/L — SIGNIFICANT CHANGE UP (ref 3.5–5.1)
POTASSIUM SERPL-MCNC: 3.8 MMOL/L — SIGNIFICANT CHANGE UP (ref 3.5–5)
POTASSIUM SERPL-SCNC: 3.8 MMOL/L — SIGNIFICANT CHANGE UP (ref 3.5–5)
PROT SERPL-MCNC: 4.8 G/DL — LOW (ref 6–8)
PROTHROM AB SERPL-ACNC: 12.7 SEC — SIGNIFICANT CHANGE UP (ref 9.95–12.87)
RBC # BLD: 2.55 M/UL — LOW (ref 4.2–5.4)
RBC # FLD: 15.7 % — HIGH (ref 11.5–14.5)
RSV RNA NPH QL NAA+NON-PROBE: SIGNIFICANT CHANGE UP
SAO2 % BLDV: 89.8 % — HIGH (ref 67–88)
SARS-COV-2 RNA SPEC QL NAA+PROBE: SIGNIFICANT CHANGE UP
SODIUM SERPL-SCNC: 148 MMOL/L — HIGH (ref 135–146)
WBC # BLD: 16.5 K/UL — HIGH (ref 4.8–10.8)
WBC # FLD AUTO: 16.5 K/UL — HIGH (ref 4.8–10.8)

## 2024-10-31 PROCEDURE — 84443 ASSAY THYROID STIM HORMONE: CPT

## 2024-10-31 PROCEDURE — 80048 BASIC METABOLIC PNL TOTAL CA: CPT

## 2024-10-31 PROCEDURE — 82438 ASSAY OTHER FLUID CHLORIDES: CPT

## 2024-10-31 PROCEDURE — 82330 ASSAY OF CALCIUM: CPT

## 2024-10-31 PROCEDURE — 86850 RBC ANTIBODY SCREEN: CPT

## 2024-10-31 PROCEDURE — C9254: CPT

## 2024-10-31 PROCEDURE — 95819 EEG AWAKE AND ASLEEP: CPT

## 2024-10-31 PROCEDURE — 82533 TOTAL CORTISOL: CPT

## 2024-10-31 PROCEDURE — 87186 SC STD MICRODIL/AGAR DIL: CPT

## 2024-10-31 PROCEDURE — 83605 ASSAY OF LACTIC ACID: CPT

## 2024-10-31 PROCEDURE — 80177 DRUG SCRN QUAN LEVETIRACETAM: CPT

## 2024-10-31 PROCEDURE — 82607 VITAMIN B-12: CPT

## 2024-10-31 PROCEDURE — 36430 TRANSFUSION BLD/BLD COMPNT: CPT

## 2024-10-31 PROCEDURE — 84295 ASSAY OF SERUM SODIUM: CPT

## 2024-10-31 PROCEDURE — 84133 ASSAY OF URINE POTASSIUM: CPT

## 2024-10-31 PROCEDURE — 83986 ASSAY PH BODY FLUID NOS: CPT

## 2024-10-31 PROCEDURE — 0241U: CPT

## 2024-10-31 PROCEDURE — 87045 FECES CULTURE AEROBIC BACT: CPT

## 2024-10-31 PROCEDURE — 94760 N-INVAS EAR/PLS OXIMETRY 1: CPT

## 2024-10-31 PROCEDURE — 95700 EEG CONT REC W/VID EEG TECH: CPT

## 2024-10-31 PROCEDURE — 85610 PROTHROMBIN TIME: CPT

## 2024-10-31 PROCEDURE — 85014 HEMATOCRIT: CPT

## 2024-10-31 PROCEDURE — 74018 RADEX ABDOMEN 1 VIEW: CPT

## 2024-10-31 PROCEDURE — 71045 X-RAY EXAM CHEST 1 VIEW: CPT | Mod: 26

## 2024-10-31 PROCEDURE — 87086 URINE CULTURE/COLONY COUNT: CPT

## 2024-10-31 PROCEDURE — 83735 ASSAY OF MAGNESIUM: CPT

## 2024-10-31 PROCEDURE — 87077 CULTURE AEROBIC IDENTIFY: CPT

## 2024-10-31 PROCEDURE — 84425 ASSAY OF VITAMIN B-1: CPT

## 2024-10-31 PROCEDURE — 85018 HEMOGLOBIN: CPT

## 2024-10-31 PROCEDURE — 85025 COMPLETE CBC W/AUTO DIFF WBC: CPT

## 2024-10-31 PROCEDURE — 80053 COMPREHEN METABOLIC PANEL: CPT

## 2024-10-31 PROCEDURE — 97535 SELF CARE MNGMENT TRAINING: CPT | Mod: GO

## 2024-10-31 PROCEDURE — 82550 ASSAY OF CK (CPK): CPT

## 2024-10-31 PROCEDURE — 85730 THROMBOPLASTIN TIME PARTIAL: CPT

## 2024-10-31 PROCEDURE — 86900 BLOOD TYPING SEROLOGIC ABO: CPT

## 2024-10-31 PROCEDURE — 84100 ASSAY OF PHOSPHORUS: CPT

## 2024-10-31 PROCEDURE — 70450 CT HEAD/BRAIN W/O DYE: CPT | Mod: MC

## 2024-10-31 PROCEDURE — P9040: CPT

## 2024-10-31 PROCEDURE — 87046 STOOL CULTR AEROBIC BACT EA: CPT

## 2024-10-31 PROCEDURE — 93010 ELECTROCARDIOGRAM REPORT: CPT

## 2024-10-31 PROCEDURE — 82436 ASSAY OF URINE CHLORIDE: CPT

## 2024-10-31 PROCEDURE — 81001 URINALYSIS AUTO W/SCOPE: CPT

## 2024-10-31 PROCEDURE — 84999 UNLISTED CHEMISTRY PROCEDURE: CPT

## 2024-10-31 PROCEDURE — 82140 ASSAY OF AMMONIA: CPT

## 2024-10-31 PROCEDURE — 86923 COMPATIBILITY TEST ELECTRIC: CPT

## 2024-10-31 PROCEDURE — 71045 X-RAY EXAM CHEST 1 VIEW: CPT

## 2024-10-31 PROCEDURE — 70496 CT ANGIOGRAPHY HEAD: CPT | Mod: 26,MC

## 2024-10-31 PROCEDURE — 84302 ASSAY OF SWEAT SODIUM: CPT

## 2024-10-31 PROCEDURE — 84132 ASSAY OF SERUM POTASSIUM: CPT

## 2024-10-31 PROCEDURE — 70551 MRI BRAIN STEM W/O DYE: CPT | Mod: MC

## 2024-10-31 PROCEDURE — 84300 ASSAY OF URINE SODIUM: CPT

## 2024-10-31 PROCEDURE — 87040 BLOOD CULTURE FOR BACTERIA: CPT

## 2024-10-31 PROCEDURE — 87641 MR-STAPH DNA AMP PROBE: CPT

## 2024-10-31 PROCEDURE — 95714 VEEG EA 12-26 HR UNMNTR: CPT

## 2024-10-31 PROCEDURE — 82803 BLOOD GASES ANY COMBINATION: CPT

## 2024-10-31 PROCEDURE — 93005 ELECTROCARDIOGRAM TRACING: CPT

## 2024-10-31 PROCEDURE — 84439 ASSAY OF FREE THYROXINE: CPT

## 2024-10-31 PROCEDURE — 36415 COLL VENOUS BLD VENIPUNCTURE: CPT

## 2024-10-31 PROCEDURE — 86901 BLOOD TYPING SEROLOGIC RH(D): CPT

## 2024-10-31 PROCEDURE — 85027 COMPLETE CBC AUTOMATED: CPT

## 2024-10-31 PROCEDURE — 99291 CRITICAL CARE FIRST HOUR: CPT

## 2024-10-31 PROCEDURE — 94640 AIRWAY INHALATION TREATMENT: CPT

## 2024-10-31 PROCEDURE — 93971 EXTREMITY STUDY: CPT | Mod: LT

## 2024-10-31 PROCEDURE — 70498 CT ANGIOGRAPHY NECK: CPT | Mod: 26,MC

## 2024-10-31 PROCEDURE — 70450 CT HEAD/BRAIN W/O DYE: CPT | Mod: 26,MC,XU

## 2024-10-31 PROCEDURE — 87640 STAPH A DNA AMP PROBE: CPT

## 2024-10-31 PROCEDURE — L8699: CPT

## 2024-10-31 PROCEDURE — 99285 EMERGENCY DEPT VISIT HI MDM: CPT

## 2024-10-31 PROCEDURE — 82962 GLUCOSE BLOOD TEST: CPT

## 2024-10-31 PROCEDURE — 80235 DRUG ASSAY LACOSAMIDE: CPT

## 2024-10-31 PROCEDURE — 80061 LIPID PANEL: CPT

## 2024-10-31 RX ORDER — LEVETIRACETAM 1000 MG/1
500 TABLET ORAL ONCE
Refills: 0 | Status: DISCONTINUED | OUTPATIENT
Start: 2024-10-31 | End: 2024-10-31

## 2024-10-31 RX ORDER — CEFEPIME 2 G/1
1000 INJECTION, POWDER, FOR SOLUTION INTRAVENOUS EVERY 12 HOURS
Refills: 0 | Status: DISCONTINUED | OUTPATIENT
Start: 2024-10-31 | End: 2024-11-01

## 2024-10-31 RX ORDER — 0.9 % SODIUM CHLORIDE 0.9 %
2000 INTRAVENOUS SOLUTION INTRAVENOUS ONCE
Refills: 0 | Status: COMPLETED | OUTPATIENT
Start: 2024-10-31 | End: 2024-10-31

## 2024-10-31 RX ORDER — PANTOPRAZOLE SODIUM 40 MG/1
40 TABLET, DELAYED RELEASE ORAL
Refills: 0 | Status: DISCONTINUED | OUTPATIENT
Start: 2024-10-31 | End: 2024-11-02

## 2024-10-31 RX ORDER — 0.9 % SODIUM CHLORIDE 0.9 %
1000 INTRAVENOUS SOLUTION INTRAVENOUS
Refills: 0 | Status: DISCONTINUED | OUTPATIENT
Start: 2024-10-31 | End: 2024-11-20

## 2024-10-31 RX ORDER — HYDRALAZINE HYDROCHLORIDE 10 MG/1
5 TABLET ORAL ONCE
Refills: 0 | Status: COMPLETED | OUTPATIENT
Start: 2024-10-31 | End: 2024-10-31

## 2024-10-31 RX ORDER — VANCOMYCIN HCL 900 MCG/MG
750 POWDER (GRAM) MISCELLANEOUS EVERY 12 HOURS
Refills: 0 | Status: DISCONTINUED | OUTPATIENT
Start: 2024-10-31 | End: 2024-11-01

## 2024-10-31 RX ORDER — GLUCAGON INJECTION, SOLUTION 0.5 MG/.1ML
1 INJECTION, SOLUTION SUBCUTANEOUS ONCE
Refills: 0 | Status: DISCONTINUED | OUTPATIENT
Start: 2024-10-31 | End: 2024-11-20

## 2024-10-31 RX ORDER — LEVETIRACETAM 1000 MG/1
1500 TABLET ORAL EVERY 12 HOURS
Refills: 0 | Status: DISCONTINUED | OUTPATIENT
Start: 2024-10-31 | End: 2024-11-01

## 2024-10-31 RX ORDER — SODIUM CHLORIDE 9 MG/ML
1000 INJECTION, SOLUTION INTRAMUSCULAR; INTRAVENOUS; SUBCUTANEOUS
Refills: 0 | Status: DISCONTINUED | OUTPATIENT
Start: 2024-10-31 | End: 2024-11-02

## 2024-10-31 RX ORDER — SODIUM POLYSTYRENE SULFONATE 4.1 MEQ/G
0 POWDER, FOR SUSPENSION ORAL; RECTAL
Qty: 0 | Refills: 4 | DISCHARGE

## 2024-10-31 RX ORDER — CHLORHEXIDINE GLUCONATE 1.2 MG/ML
1 RINSE ORAL
Refills: 0 | Status: DISCONTINUED | OUTPATIENT
Start: 2024-10-31 | End: 2024-11-20

## 2024-10-31 RX ORDER — LABETALOL 100 MG/1
10 TABLET, FILM COATED ORAL
Refills: 0 | Status: DISCONTINUED | OUTPATIENT
Start: 2024-10-31 | End: 2024-11-09

## 2024-10-31 RX ORDER — LABETALOL 100 MG/1
10 TABLET, FILM COATED ORAL ONCE
Refills: 0 | Status: COMPLETED | OUTPATIENT
Start: 2024-10-31 | End: 2024-10-31

## 2024-10-31 RX ORDER — LEVETIRACETAM 1000 MG/1
1000 TABLET ORAL ONCE
Refills: 0 | Status: COMPLETED | OUTPATIENT
Start: 2024-10-31 | End: 2024-10-31

## 2024-10-31 RX ORDER — CEFEPIME 2 G/1
2000 INJECTION, POWDER, FOR SOLUTION INTRAVENOUS ONCE
Refills: 0 | Status: COMPLETED | OUTPATIENT
Start: 2024-10-31 | End: 2024-10-31

## 2024-10-31 RX ORDER — LISINOPRIL 40 MG
1 TABLET ORAL
Refills: 0 | DISCHARGE

## 2024-10-31 RX ORDER — VANCOMYCIN HCL 900 MCG/MG
1000 POWDER (GRAM) MISCELLANEOUS ONCE
Refills: 0 | Status: COMPLETED | OUTPATIENT
Start: 2024-10-31 | End: 2024-10-31

## 2024-10-31 RX ADMIN — Medication 2000 MILLILITER(S): at 14:40

## 2024-10-31 RX ADMIN — LABETALOL 10 MILLIGRAM(S): 100 TABLET, FILM COATED ORAL at 18:00

## 2024-10-31 RX ADMIN — SODIUM CHLORIDE 50 MILLILITER(S): 9 INJECTION, SOLUTION INTRAMUSCULAR; INTRAVENOUS; SUBCUTANEOUS at 18:14

## 2024-10-31 RX ADMIN — Medication 2000 MILLILITER(S): at 13:40

## 2024-10-31 RX ADMIN — CEFEPIME 100 MILLIGRAM(S): 2 INJECTION, POWDER, FOR SOLUTION INTRAVENOUS at 13:40

## 2024-10-31 RX ADMIN — CEFEPIME 100 MILLIGRAM(S): 2 INJECTION, POWDER, FOR SOLUTION INTRAVENOUS at 21:29

## 2024-10-31 RX ADMIN — LEVETIRACETAM 400 MILLIGRAM(S): 1000 TABLET ORAL at 13:42

## 2024-10-31 RX ADMIN — HYDRALAZINE HYDROCHLORIDE 5 MILLIGRAM(S): 10 TABLET ORAL at 23:06

## 2024-10-31 RX ADMIN — LEVETIRACETAM 500 MILLIGRAM(S): 1000 TABLET ORAL at 14:36

## 2024-10-31 RX ADMIN — LEVETIRACETAM 500 MILLIGRAM(S): 1000 TABLET ORAL at 16:21

## 2024-10-31 RX ADMIN — LEVETIRACETAM 1500 MILLIGRAM(S): 1000 TABLET ORAL at 20:02

## 2024-10-31 RX ADMIN — Medication 250 MILLIGRAM(S): at 13:40

## 2024-10-31 RX ADMIN — LABETALOL 10 MILLIGRAM(S): 100 TABLET, FILM COATED ORAL at 21:21

## 2024-10-31 NOTE — H&P ADULT - HISTORY OF PRESENT ILLNESS
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 While in NSICU, patient underwent MMA embolization and post operatively was stable, however, on 10/16 she was noted to have leaking from her crani site. STAT CTH showed increase in SDH and extracalvarial collection, NSG removed 25cc of blood and was re-sutured. Patient was then downgraded to the floor, was started on zosyn for UTI, and was discharged to short term rehab on 10/27. Today, patient was more lethargic and presented to the ED for evaluation. CTH showing stable SDH, CTA negative for acute stroke. NSx and NSICU consulted for management.

## 2024-10-31 NOTE — H&P ADULT - ASSESSMENT
79F w/ PMHx of MDS, DM, HTN, CKD, recent admission for SDH s/p crani, evac and MMA embolization, presents with decreased MS, found to have stable SDH, admitted to NSICU for r/o status epilepticus     #SDH  #R/o Status epilepticus  #HTN  #MDS  #CKD    Plan:    Neurological:  - Neuro Checks Q1 NC  - Repeat CTH this evening  - MR Brain when stable  - NSx following - No acute intervention  - vEEG  - S/p Keppra 1.5 g (home dose of 750 mg BID)  - Continue AED regimen: Keppra 1 g BID  - Analgesia/Sedation: Tylenol PRN  - PT/OT    Cardiovascular:  - SBP goal: 110 - 160  - Normovolemia  - Holding Home AntiHTN: lisinopril, labetalol, amlodipine  - Echo < from: TTE Echo Complete w/o Contrast w/ Doppler (10.09.24 @ 12:41) >Normal global left ventricular systolic function. Left ventricular ejection fraction, by visual estimation, is 65 to 70%  - EKG    Pulmonary:  - HOB 45  - Aspiration precautions  - Ongoing GOC discussion with family regarding airway status - low threshold to intubate, patients physical exam and mental status improving after keppra load, will monitor for now.  -  Mouth care per protocol    GI:  - NPO for airway protection  - Dysphagia screen as tolerated, likely will require an NGT  - Protonix (home medication)  - Bowel Regimen: Senna/Mirilax (when tolerating PO)  - LBM: PTA    /Renal:  - Strict Is/Os  - Bladder scan Q6H  - Sodium Goal: 135 - 145  - Replete as needed.  - Mg > 2    Endo:  - POCT, ISS,  -180  - A1c: 6  - Lipid Profile: P  - TSH: 2.63    ID: leukocytosis (seizure vs infectious)  - Trend Fever curve and WBC  - Sepsis w/u  - Normothermia    Hematology:  - SCDs    MISC:   PT/OT [x]  SLP [x]  CAM-ICU[x]  CPOT/Pain score[ ] PRN Analgesia [x] Continuous Analgesia [ ]  RASS [ ] Continuous Sedation [ ]  Early Mobility [x]  Restraints [ ]  Family Updated[x]    Lines/Tubes:  PIV: x 2    Code Status:F    Dispo: NSICU     Discussed with Attending Physician: Dr Ortiz         79F w/ PMHx of MDS, DM, HTN, CKD, recent admission for SDH s/p crani, evac and MMA embolization, presents with decreased MS, found to have stable SDH, admitted to NSICU for r/o status epilepticus     #SDH  #R/o Status epilepticus  #HTN  #MDS  #CKD  #Fever    Plan:    Neurological:  - Neuro Checks Q1 NC  - Repeat CTH this evening- monitor increased heme in R SDH - Stable   - MR Brain when stable  - NSx following - No acute intervention  - vEEG  - S/p Keppra 1.5 g (home dose of 750 mg BID)  - Continue AED regimen: Keppra 1 g BID  - Analgesia/Sedation: Tylenol PRN  - PT/OT    Cardiovascular:  - SBP goal: 110 - 160  - Normovolemia  - Holding Home AntiHTN: lisinopril, labetalol, amlodipine  - Echo < from: TTE Echo Complete w/o Contrast w/ Doppler (10.09.24 @ 12:41) >Normal global left ventricular systolic function. Left ventricular ejection fraction, by visual estimation, is 65 to 70%  - EKG    Pulmonary:  - HOB 45  - Aspiration precautions  - Ongoing GOC discussion with family regarding airway status - low threshold to intubate, patients physical exam and mental status improving after keppra load, will monitor for now.  -  Mouth care per protocol    GI:  - NPO for airway protection  - Dysphagia screen as tolerated, likely will require an NGT  - Protonix (home medication)  - Bowel Regimen: Senna/Mirilax (when tolerating PO)  - LBM: PTA    /Renal:  - Strict Is/Os  - Bladder scan Q6H  - Sodium Goal: 135 - 145  - Replete as needed.  - Mg > 2    Endo:  - POCT, ISS,  -180  - A1c: 6  - Lipid Profile: P  - TSH: 2.63    ID: leukocytosis (seizure vs infectious)  - Trend Fever curve and WBC  - Sepsis w/u- Blood cultures, urine , CXR , procalcitonin  - Cefepime + Vanco   - F/U cultures if neg will D/C   - Normothermia    Hematology: MDS/ Leucocytosis  Chronic blood transfusions  - SCDs    MISC:   PT/OT [x]  SLP [x]  CAM-ICU[x]  CPOT/Pain score[ ] PRN Analgesia [x] Continuous Analgesia [ ]  RASS [ ] Continuous Sedation [ ]  Early Mobility [x]  Restraints [ ]  Family Updated[x]    Lines/Tubes:  PIV: x 2    Code Status:F    Dispo: NSICU     Discussed with Attending Physician: Dr Ortiz

## 2024-10-31 NOTE — ED PROVIDER NOTE - CLINICAL SUMMARY MEDICAL DECISION MAKING FREE TEXT BOX
71-year-old woman, history of MDS, hypertension, diabetes, recent craniotomy 2/2 subdermal hemorrhage, just discharged from [facility] 3 days ago to short-term rehab brought in by ambulance due to change in mental status, hypoglycemia, fever intermittent since yesterday.  Son is at bedside, said this morning patient seemed even worse, not making eye contact, not speaking at all.  On arrival, patient with low-grade temp rectally of 100.4.   Patient initially taken to CT to rule out bleed versus no seizure.  Patient upgraded to the critical care ED at which point neurosurgery attending evaluated patient believe that likely symptoms are secondary to seizure given the fact that patient does have an infection likely lowering her seizure threshold recommending only giving Keppra as to not give Ativan currently and to treat infection.  Broad-spectrum antibiotics given.Neurocritical care evaluated patient as well recommended an additional 500 mg of Keppra.  Patient reassessed patient has purposeful movements spoke with patient's daughter regarding intubation the moment daughter is on her way did not want to decide over the phone.  This was endorsed to the neurocritical care team.

## 2024-10-31 NOTE — CONSULT NOTE ADULT - ASSESSMENT
79-year-old female past medical history MDS, diabetes, hypertension, CKD status post craniotomy with embolization for right subdural discharge 3 days ago to short-term rehab, discharged on Keppra, presents emergency department for altered mental status     PLAN   - No neurosurgical intervention   - Fever work up: Recc UA with cultures/blood cultures, RVP, Chest XR   - If infectious workup/fever work up is all negative, then proceed with MR Brain w/wo yen   - Keppra and EEG evaluation per NCC   - Discussed case with attending    79-year-old female past medical history MDS, diabetes, hypertension, CKD status post craniotomy with embolization for right subdural discharge 3 days ago to short-term rehab, discharged on Keppra, presents emergency department for altered mental status     PLAN   - No neurosurgical intervention   - Fever work up: Recc UA with cultures/blood cultures, RVP, Chest XR   - If infectious workup/fever work up is all negative, then proceed with MR Brain w/wo yen   - Keep SBP < 160   - Keppra and EEG evaluation per NCC   - Discussed case with attending

## 2024-10-31 NOTE — ED PROVIDER NOTE - CARE PLAN
1 Principal Discharge DX:	Sepsis  Secondary Diagnosis:	Bilateral pleural effusion  Secondary Diagnosis:	Altered mental status  Secondary Diagnosis:	Seizures

## 2024-10-31 NOTE — H&P ADULT - CRITICAL CARE ATTENDING COMMENT
patient seen and examined . All labs , vitals, meds  reviewed. Patient examined .   Patient to remain in ICU for q 1 hrs neuro checks , airway watch due to poor mental status, continue Video EEG.

## 2024-10-31 NOTE — ED PROVIDER NOTE - ATTENDING APP SHARED VISIT CONTRIBUTION OF CARE
71-year-old woman, history of MDS, hypertension, diabetes, recent craniotomy 2/2 subdermal hemorrhage, just discharged from [facility] [place] 3 days ago to short-term rehab brought in by ambulance due to change in mental status, hypoglycemia, fever intermittent since yesterday.  Son is at bedside, said this morning patient seemed even worse, not making eye contact, not speaking at all.  On arrival, patient with low-grade temp rectally of 100.4.  Pupils appear equal, but patient gazes to the left, and is unresponsive to commands and does not localize to painful stimuli.  She is mouth breathing with occasional snoring.  Lungs with decreased breath sounds, abdomen soft, no peripheral edema.  Left upper and lower extremity seem flaccid, right upper extremity and lower extremity less so, but patient does not follow commands.  Concern for rebleeding, versus LVO CVA, versus seizure.  IV was placed and patient was given antibiotics to cover sepsis, and expedited to CAT scan with plan to upgrade to critical care area upon return as patient barely protecting her airway on my eval.  I spoke with son at the bedside regarding the possibility that patient might require intubation; he says that his sister has been in charge of making most of the medical decisions, and she is on her way.  Case endorsed to Dr. Murray and critical care team.

## 2024-10-31 NOTE — ED PROVIDER NOTE - PROGRESS NOTE DETAILS
Patient was made code sepsis, patient expedited to CT scan, neurosurgery aware, neurosurgery at bedside recommends admission to neurocritical care, neurocritical care aware will see patient in ER.  Per neurosurgery give IV Keppra, 1 dose of labetalol push for elevated blood pressure, recommends inpatient MR with and without.  Case endorsed to critical care team Upon return from CT, pt marginally more responsive to noxious stimuli. Overall picture seems more c/w seizure; Dr Kidd of neurosurgery at bedside, rec 1 gram keppra, BP control, neurocrit eval. Authored by Dr. Jose Streeter spoke with patients daughter, unsure of intubation, will be driving to the ED shortly. Authored by Dr. Jose Streeter spoke with neurocrit pa, recommending admission to their service to dr. danielle

## 2024-10-31 NOTE — H&P ADULT - NSHPLABSRESULTS_GEN_ALL_CORE
CBC Full  -  ( 31 Oct 2024 12:05 )  WBC Count : 16.50 K/uL  RBC Count : 2.55 M/uL  Hemoglobin : 7.6 g/dL  Hematocrit : 23.2 %  Platelet Count - Automated : 225 K/uL  Mean Cell Volume : 91.0 fL  Mean Cell Hemoglobin : 29.8 pg  Mean Cell Hemoglobin Concentration : 32.8 g/dL  Auto Neutrophil # : 13.63 K/uL  Auto Lymphocyte # : 1.29 K/uL  Auto Monocyte # : 1.15 K/uL  Auto Eosinophil # : 0.11 K/uL  Auto Basophil # : 0.09 K/uL  Auto Neutrophil % : 82.6 %  Auto Lymphocyte % : 7.8 %  Auto Monocyte % : 7.0 %  Auto Eosinophil % : 0.7 %  Auto Basophil % : 0.5 %    10-31    148[H]  |  113[H]  |  31[H]  ----------------------------<  99  3.8   |  20  |  1.4    Ca    8.4      31 Oct 2024 12:05    TPro  4.8[L]  /  Alb  3.5  /  TBili  0.7  /  DBili  x   /  AST  33  /  ALT  32  /  AlkPhos  88  10-31    < from: CT Head No Cont (10.31.24 @ 12:58) >      ACC: [id number] EXAM:  CT ANGIO BRAIN (W)AW IC   ORDERED BY: ANGELIA ESPARZA     ACC: [id number] EXAM:  CT ANGIO NECK (W)AW IC   ORDERED BY: ANGELIA ESPARZA     ACC: [id number] EXAM:  CT BRAIN   ORDERED BY: ANGELIA ESPARZA     PROCEDURE DATE:  10/31/2024      < end of copied text >    < from: CT Head No Cont (10.31.24 @ 12:58) >    IMPRESSION:  CT HEAD:  No significant change in appearance of a residual mixed density right   hemispheric subdural hematoma status post craniotomy for evacuation with   minimal leftward midline shift.    Similar focal vasogenic edema within the right parietal lobe which may   reflect subacute infarct or traumatic contusion.    CTA HEAD:  No evidence of flow-limiting stenosis, occlusion or aneurysm.    CTA NECK:  No evidence of carotid or vertebral artery stenosis.    < end of copied text > CBC Full  -  ( 31 Oct 2024 12:05 )  WBC Count : 16.50 K/uL  RBC Count : 2.55 M/uL  Hemoglobin : 7.6 g/dL  Hematocrit : 23.2 %  Platelet Count - Automated : 225 K/uL  Mean Cell Volume : 91.0 fL  Mean Cell Hemoglobin : 29.8 pg  Mean Cell Hemoglobin Concentration : 32.8 g/dL  Auto Neutrophil # : 13.63 K/uL  Auto Lymphocyte # : 1.29 K/uL  Auto Monocyte # : 1.15 K/uL  Auto Eosinophil # : 0.11 K/uL  Auto Basophil # : 0.09 K/uL  Auto Neutrophil % : 82.6 %  Auto Lymphocyte % : 7.8 %  Auto Monocyte % : 7.0 %  Auto Eosinophil % : 0.7 %  Auto Basophil % : 0.5 %    10-31    148[H]  |  113[H]  |  31[H]  ----------------------------<  99  3.8   |  20  |  1.4    Ca    8.4      31 Oct 2024 12:05    TPro  4.8[L]  /  Alb  3.5  /  TBili  0.7  /  DBili  x   /  AST  33  /  ALT  32  /  AlkPhos  88  10-31    : CT Head No Cont (10.31.24 @ 12:58) >      ACC: [id number] EXAM:  CT ANGIO BRAIN (W)AW IC   ORDERED BY: ANGELIA ESPARZA     ACC: [id number] EXAM:  CT ANGIO NECK (W)AW IC   ORDERED BY: ANGELIA ESPARZA     ACC: [id number] EXAM:  CT BRAIN   ORDERED BY: ANGELIA ESPARZA     PROCEDURE DATE:  10/31/2024       CT Head No Cont (10.31.24 @ 12:58) >    IMPRESSION:  CT HEAD:  No significant change in appearance of a residual mixed density right   hemispheric subdural hematoma status post craniotomy for evacuation with   minimal leftward midline shift.    Similar focal vasogenic edema within the right parietal lobe which may   reflect subacute infarct or traumatic contusion.    CTA HEAD:  No evidence of flow-limiting stenosis, occlusion or aneurysm.    CTA NECK:  No evidence of carotid or vertebral artery stenosis.    < end of copied text >

## 2024-10-31 NOTE — ED ADULT TRIAGE NOTE - CHIEF COMPLAINT QUOTE
Pt BIBA  from [facility] presents with  AMS and fevers since yesterday. FS at the NH 50, PO glucagon x1 given. FS in triage 113. pt confused at baseline, but responsive, pt more lethargic today. pt on 5LNC.

## 2024-10-31 NOTE — ED PROVIDER NOTE - OBJECTIVE STATEMENT
79-year-old female past medical history MDS, diabetes, hypertension, CKD status post craniotomy with embolization for right subdural discharge 3 days ago to short-term rehab, discharged on Keppra, presents emergency department for altered mental status.  Per son who is at bedside saw patient yesterday patient was more so confused noted that blood sugar was low received medication and responded well to that.  Before leaving at 7 PM patient was around her baseline mental status.  Per son today went to visit patient and she was again altered called EMS and presented to emergency department.  On initial exam patient is nonresponsive to tactile verbal stimuli, rectal temperature of 100.4. I am unable to obtain a comprehensive history, review of systems, past medical history, and/or physical exam due to constraints imposed by the urgency of the patient's clinical condition and/or mental status.

## 2024-10-31 NOTE — ED PROVIDER NOTE - INPATIENT RECORD SUMMARY
recent craniotomy 2/2 subdermal hemorrhage, just discharged from [facility] 3 days ago to

## 2024-10-31 NOTE — ED PROVIDER NOTE - CONSIDERATION OF ADMISSION OBSERVATION
Consideration of Admission/Observation Appropriate medications for patients presenting complaints were offered and effects were re-assessed Patients external records reviewed. Additional history was obtained from son . Escalation to admission was considered. At this time patient requires inpatient hospitalization.

## 2024-10-31 NOTE — H&P ADULT - NSHPPHYSICALEXAM_GEN_ALL_CORE
PHYSICAL EXAM:  GENERAL: well built, well nourished  HEAD:  Atraumatic, Normocephalic  EYES: EOMI, PERRLA, conjunctiva and sclera clear  ENT: No tonsillar erythema, exudates, or enlargement; Moist mucous membranes, Good dentition, No lesions  NECK: Supple, No JVD, Normal thyroid, no enlarged nodes  CHEST/LUNG: B/L good air entry; No rales, rhonchi, or wheezing  HEART: S1S2 normal, no S3, Regular rate and rhythm; No murmurs, rubs, or gallops  ABDOMEN: Soft, Nontender, Nondistended; Bowel sounds present  EXTREMITIES:  2+ Peripheral Pulses, No clubbing, cyanosis, or edema  LYMPH: No lymphadenopathy noted  SKIN: No rashes or lesions  NEURO: GCS 4, briefly opens eyes to noxious stimuli, right gaze preference but able to cross midline, +beating nystagmus, PERRLA, weak gag/cough, B/L UE withdrawal to noxious with R> L, B/L LE WD to noxious

## 2024-10-31 NOTE — CONSULT NOTE ADULT - SUBJECTIVE AND OBJECTIVE BOX
NEUROSURGERY CONSULT  SIMONA KUHN   10-31-24 @ 13:46    HPI: 79-year-old female past medical history MDS, diabetes, hypertension, CKD status post craniotomy with embolization for right subdural discharge 3 days ago to short-term rehab, discharged on Keppra, presents emergency department for altered mental status.  Per son who is at bedside saw patient yesterday patient was more so confused noted that blood sugar was low received medication and responded well to that.  Before leaving at 7 PM patient was around her baseline mental status.  Per son today went to visit patient and she was again altered called EMS and presented to emergency department.  On initial exam patient is nonresponsive to tactile verbal stimuli, rectal temperature of 100.4. I am unable to obtain a comprehensive history, review of systems, past medical history, and/or physical exam due to constraints imposed by the urgency of the patient's clinical condition and/or mental status.    Neurosurgery was called by ED for patient known to neurosurgical service with s/p R hemicrani for evacuation IPH with R EVD. Patient called for patient with change in MS and unresponsive in the ED. CTH was done which showed no significant changes.     RADIOLOGY:   < from: CT Head No Cont (10.25.24 @ 14:19) >  IMPRESSION:  1.  No evidence of acute large territory infarct. Since the CT head   10/18/2024:  2.  Redemonstrated right sided craniotomy.  3.  Increasingsubcutaneous scalp fluid collection overlying the   craniotomy measuring up to 1.8 cm in width, previously 1.2 cm.  4.  Similar size/configuration of the right cerebral convexity subdural   hematoma measuring 1.4 cm and the left frontal convexity subdural   hematoma measuring up to 0.3 cm    PAST MEDICAL & SURGICAL HISTORY:  DM (diabetes mellitus)  MDS (myelodysplastic syndrome)  H/O colonoscopy    FAMILY HISTORY:    SOCIAL HISTORY:  Tobacco Use:  EtOH use:   Substance:    Allergies  No Known Allergies  Intolerances    [X] A ten-point review of systems is negative except as noted   [  ] Due to altered mental status/intubation, subjective information were not able to be obtained from the patient. History was obtained, to the extent possible, from review of the chart and collateral sources of information    MEDS:  labetalol Injectable 10 milliGRAM(s) IV Push once  PHYSICAL EXAM:    NEUROLOGICAL EXAM   Sleepy but arousable to noxious   Not following commands   Pupils reactive 4mm b/l   R gaze   WD all 4 extremities to noxious stim   Incision: C/D/I well healing     Vital Signs Last 24 Hrs  T(C): 38 (31 Oct 2024 14:00), Max: 38 (31 Oct 2024 14:00)  T(F): 100.4 (31 Oct 2024 14:00), Max: 100.4 (31 Oct 2024 14:00)  HR: 96 (31 Oct 2024 11:28) (96 - 96)  BP: 196/66 (31 Oct 2024 11:28) (196/66 - 196/66)  BP(mean): --  RR: 20 (31 Oct 2024 11:28) (20 - 20)  SpO2: 100% (31 Oct 2024 11:28) (100% - 100%)    Parameters below as of 31 Oct 2024 11:28  Patient On (Oxygen Delivery Method): nasal cannula  O2 Flow (L/min): 5    LABS:                        7.6    16.50 )-----------( 225      ( 31 Oct 2024 12:05 )             23.2     10-31    148[H]  |  113[H]  |  31[H]  ----------------------------<  99  3.8   |  20  |  1.4    Ca    8.4      31 Oct 2024 12:05    TPro  4.8[L]  /  Alb  3.5  /  TBili  0.7  /  DBili  x   /  AST  33  /  ALT  32  /  AlkPhos  88  10-31  PT/INR - ( 31 Oct 2024 12:05 )   PT: 12.70 sec;   INR: 1.07 ratio    PTT - ( 31 Oct 2024 12:05 )  PTT:26.4 sec

## 2024-10-31 NOTE — ED PROVIDER NOTE - PHYSICAL EXAMINATION
Physical Exam    Vital Signs: I have reviewed the initial vital signs.  Constitutional: appears stated age, no acute distress  Eyes: Conjunctiva pink, Sclera clear, PERRLA, Left upward fixed gaze.  Cardiovascular: S1 and S2, Tachycardia, regular rhythm, well-perfused extremities, radial pulses equal and 2+, pedal pulses 2+ and equal  Respiratory:  decreased labored respiratory effort, Bibasilar rales  Gastrointestinal: soft, non-tender abdomen, no pulsatile mass, normal bowl sounds  Musculoskeletal: supple neck, no lower extremity edema, no midline tenderness  Integumentary: warm, dry, no rash  Neurologic: Lethargic, unresponsive, not responding to verbal stimuli, not following commands, minimal response to tactile stimuli, left upward fixed gaze, GCS 6/7

## 2024-11-01 DIAGNOSIS — R41.82 ALTERED MENTAL STATUS, UNSPECIFIED: ICD-10-CM

## 2024-11-01 DIAGNOSIS — Z86.79 PERSONAL HISTORY OF OTHER DISEASES OF THE CIRCULATORY SYSTEM: ICD-10-CM

## 2024-11-01 DIAGNOSIS — Z51.5 ENCOUNTER FOR PALLIATIVE CARE: ICD-10-CM

## 2024-11-01 LAB
ALBUMIN SERPL ELPH-MCNC: 3.5 G/DL — SIGNIFICANT CHANGE UP (ref 3.5–5.2)
ALP SERPL-CCNC: 86 U/L — SIGNIFICANT CHANGE UP (ref 30–115)
ALT FLD-CCNC: 31 U/L — SIGNIFICANT CHANGE UP (ref 0–41)
ANION GAP SERPL CALC-SCNC: 14 MMOL/L — SIGNIFICANT CHANGE UP (ref 7–14)
APPEARANCE UR: CLEAR — SIGNIFICANT CHANGE UP
AST SERPL-CCNC: 23 U/L — SIGNIFICANT CHANGE UP (ref 0–41)
BACTERIA # UR AUTO: NEGATIVE /HPF — SIGNIFICANT CHANGE UP
BILIRUB SERPL-MCNC: 0.8 MG/DL — SIGNIFICANT CHANGE UP (ref 0.2–1.2)
BILIRUB UR-MCNC: NEGATIVE — SIGNIFICANT CHANGE UP
BUN SERPL-MCNC: 28 MG/DL — HIGH (ref 10–20)
CALCIUM SERPL-MCNC: 8.7 MG/DL — SIGNIFICANT CHANGE UP (ref 8.4–10.4)
CAST: 1 /LPF — SIGNIFICANT CHANGE UP (ref 0–4)
CHLORIDE SERPL-SCNC: 113 MMOL/L — HIGH (ref 98–110)
CO2 SERPL-SCNC: 20 MMOL/L — SIGNIFICANT CHANGE UP (ref 17–32)
COLOR SPEC: YELLOW — SIGNIFICANT CHANGE UP
CREAT SERPL-MCNC: 1.4 MG/DL — SIGNIFICANT CHANGE UP (ref 0.7–1.5)
DIFF PNL FLD: NEGATIVE — SIGNIFICANT CHANGE UP
EGFR: 39 ML/MIN/1.73M2 — LOW
GLUCOSE BLDC GLUCOMTR-MCNC: 107 MG/DL — HIGH (ref 70–99)
GLUCOSE BLDC GLUCOMTR-MCNC: 148 MG/DL — HIGH (ref 70–99)
GLUCOSE BLDC GLUCOMTR-MCNC: 224 MG/DL — HIGH (ref 70–99)
GLUCOSE BLDC GLUCOMTR-MCNC: 69 MG/DL — LOW (ref 70–99)
GLUCOSE BLDC GLUCOMTR-MCNC: 80 MG/DL — SIGNIFICANT CHANGE UP (ref 70–99)
GLUCOSE BLDC GLUCOMTR-MCNC: 81 MG/DL — SIGNIFICANT CHANGE UP (ref 70–99)
GLUCOSE BLDC GLUCOMTR-MCNC: 85 MG/DL — SIGNIFICANT CHANGE UP (ref 70–99)
GLUCOSE SERPL-MCNC: 77 MG/DL — SIGNIFICANT CHANGE UP (ref 70–99)
GLUCOSE UR QL: NEGATIVE MG/DL — SIGNIFICANT CHANGE UP
HCT VFR BLD CALC: 20.8 % — LOW (ref 37–47)
HCT VFR BLD CALC: 22 % — LOW (ref 37–47)
HGB BLD-MCNC: 6.8 G/DL — CRITICAL LOW (ref 12–16)
HGB BLD-MCNC: 7.1 G/DL — LOW (ref 12–16)
KETONES UR-MCNC: NEGATIVE MG/DL — SIGNIFICANT CHANGE UP
LEUKOCYTE ESTERASE UR-ACNC: ABNORMAL
MAGNESIUM SERPL-MCNC: 1.9 MG/DL — SIGNIFICANT CHANGE UP (ref 1.8–2.4)
MCHC RBC-ENTMCNC: 29.7 PG — SIGNIFICANT CHANGE UP (ref 27–31)
MCHC RBC-ENTMCNC: 30.1 PG — SIGNIFICANT CHANGE UP (ref 27–31)
MCHC RBC-ENTMCNC: 32.3 G/DL — SIGNIFICANT CHANGE UP (ref 32–37)
MCHC RBC-ENTMCNC: 32.7 G/DL — SIGNIFICANT CHANGE UP (ref 32–37)
MCV RBC AUTO: 92 FL — SIGNIFICANT CHANGE UP (ref 81–99)
MCV RBC AUTO: 92.1 FL — SIGNIFICANT CHANGE UP (ref 81–99)
NITRITE UR-MCNC: NEGATIVE — SIGNIFICANT CHANGE UP
NRBC # BLD: 0 /100 WBCS — SIGNIFICANT CHANGE UP (ref 0–0)
NRBC # BLD: 0 /100 WBCS — SIGNIFICANT CHANGE UP (ref 0–0)
PH UR: 6 — SIGNIFICANT CHANGE UP (ref 5–8)
PHOSPHATE SERPL-MCNC: 5.3 MG/DL — HIGH (ref 2.1–4.9)
PLATELET # BLD AUTO: 233 K/UL — SIGNIFICANT CHANGE UP (ref 130–400)
PLATELET # BLD AUTO: 247 K/UL — SIGNIFICANT CHANGE UP (ref 130–400)
PMV BLD: 10.6 FL — HIGH (ref 7.4–10.4)
PMV BLD: 10.7 FL — HIGH (ref 7.4–10.4)
POTASSIUM SERPL-MCNC: 4.2 MMOL/L — SIGNIFICANT CHANGE UP (ref 3.5–5)
POTASSIUM SERPL-SCNC: 4.2 MMOL/L — SIGNIFICANT CHANGE UP (ref 3.5–5)
PROT SERPL-MCNC: 4.8 G/DL — LOW (ref 6–8)
PROT UR-MCNC: 30 MG/DL
RBC # BLD: 2.26 M/UL — LOW (ref 4.2–5.4)
RBC # BLD: 2.39 M/UL — LOW (ref 4.2–5.4)
RBC # FLD: 15.5 % — HIGH (ref 11.5–14.5)
RBC # FLD: 15.7 % — HIGH (ref 11.5–14.5)
RBC CASTS # UR COMP ASSIST: 0 /HPF — SIGNIFICANT CHANGE UP (ref 0–4)
SODIUM SERPL-SCNC: 147 MMOL/L — HIGH (ref 135–146)
SP GR SPEC: >1.03 — HIGH (ref 1–1.03)
SQUAMOUS # UR AUTO: 2 /HPF — SIGNIFICANT CHANGE UP (ref 0–5)
UROBILINOGEN FLD QL: 0.2 MG/DL — SIGNIFICANT CHANGE UP (ref 0.2–1)
WBC # BLD: 7.79 K/UL — SIGNIFICANT CHANGE UP (ref 4.8–10.8)
WBC # BLD: 8.05 K/UL — SIGNIFICANT CHANGE UP (ref 4.8–10.8)
WBC # FLD AUTO: 7.79 K/UL — SIGNIFICANT CHANGE UP (ref 4.8–10.8)
WBC # FLD AUTO: 8.05 K/UL — SIGNIFICANT CHANGE UP (ref 4.8–10.8)
WBC UR QL: 59 /HPF — HIGH (ref 0–5)

## 2024-11-01 PROCEDURE — 95720 EEG PHY/QHP EA INCR W/VEEG: CPT

## 2024-11-01 PROCEDURE — 99291 CRITICAL CARE FIRST HOUR: CPT

## 2024-11-01 PROCEDURE — 99223 1ST HOSP IP/OBS HIGH 75: CPT

## 2024-11-01 PROCEDURE — 70450 CT HEAD/BRAIN W/O DYE: CPT | Mod: 26

## 2024-11-01 RX ORDER — VANCOMYCIN HCL 900 MCG/MG
1000 POWDER (GRAM) MISCELLANEOUS EVERY 24 HOURS
Refills: 0 | Status: DISCONTINUED | OUTPATIENT
Start: 2024-11-01 | End: 2024-11-02

## 2024-11-01 RX ORDER — LEVETIRACETAM 1000 MG/1
500 TABLET ORAL ONCE
Refills: 0 | Status: DISCONTINUED | OUTPATIENT
Start: 2024-11-01 | End: 2024-11-01

## 2024-11-01 RX ORDER — LACOSAMIDE 10 MG/ML
100 INJECTION INTRAVENOUS ONCE
Refills: 0 | Status: DISCONTINUED | OUTPATIENT
Start: 2024-11-01 | End: 2024-11-01

## 2024-11-01 RX ORDER — PIPERACILLIN SODIUM AND TAZOBACTAM SODIUM 4; .5 G/20ML; G/20ML
3.38 INJECTION, POWDER, LYOPHILIZED, FOR SOLUTION INTRAVENOUS ONCE
Refills: 0 | Status: COMPLETED | OUTPATIENT
Start: 2024-11-01 | End: 2024-11-01

## 2024-11-01 RX ORDER — NICARDIPINE HYDROCHLORIDE 2.5 MG/ML
5 INJECTION INTRAVENOUS
Qty: 40 | Refills: 0 | Status: DISCONTINUED | OUTPATIENT
Start: 2024-11-01 | End: 2024-11-02

## 2024-11-01 RX ORDER — PIPERACILLIN SODIUM AND TAZOBACTAM SODIUM 4; .5 G/20ML; G/20ML
3.38 INJECTION, POWDER, LYOPHILIZED, FOR SOLUTION INTRAVENOUS EVERY 8 HOURS
Refills: 0 | Status: DISCONTINUED | OUTPATIENT
Start: 2024-11-01 | End: 2024-11-05

## 2024-11-01 RX ORDER — LACOSAMIDE 10 MG/ML
100 INJECTION INTRAVENOUS EVERY 12 HOURS
Refills: 0 | Status: DISCONTINUED | OUTPATIENT
Start: 2024-11-01 | End: 2024-11-01

## 2024-11-01 RX ORDER — LEVETIRACETAM 1000 MG/1
750 TABLET ORAL EVERY 12 HOURS
Refills: 0 | Status: DISCONTINUED | OUTPATIENT
Start: 2024-11-01 | End: 2024-11-02

## 2024-11-01 RX ORDER — LACOSAMIDE 10 MG/ML
50 INJECTION INTRAVENOUS EVERY 12 HOURS
Refills: 0 | Status: DISCONTINUED | OUTPATIENT
Start: 2024-11-01 | End: 2024-11-02

## 2024-11-01 RX ADMIN — NICARDIPINE HYDROCHLORIDE 25 MG/HR: 2.5 INJECTION INTRAVENOUS at 09:58

## 2024-11-01 RX ADMIN — Medication 12.5 GRAM(S): at 01:15

## 2024-11-01 RX ADMIN — SODIUM CHLORIDE 50 MILLILITER(S): 9 INJECTION, SOLUTION INTRAMUSCULAR; INTRAVENOUS; SUBCUTANEOUS at 10:00

## 2024-11-01 RX ADMIN — LACOSAMIDE 100 MILLIGRAM(S): 10 INJECTION INTRAVENOUS at 11:19

## 2024-11-01 RX ADMIN — LACOSAMIDE 100 MILLIGRAM(S): 10 INJECTION INTRAVENOUS at 19:23

## 2024-11-01 RX ADMIN — PIPERACILLIN SODIUM AND TAZOBACTAM SODIUM 200 GRAM(S): 4; .5 INJECTION, POWDER, LYOPHILIZED, FOR SOLUTION INTRAVENOUS at 11:19

## 2024-11-01 RX ADMIN — LEVETIRACETAM 750 MILLIGRAM(S): 1000 TABLET ORAL at 18:24

## 2024-11-01 RX ADMIN — PIPERACILLIN SODIUM AND TAZOBACTAM SODIUM 25 GRAM(S): 4; .5 INJECTION, POWDER, LYOPHILIZED, FOR SOLUTION INTRAVENOUS at 22:02

## 2024-11-01 RX ADMIN — LABETALOL 10 MILLIGRAM(S): 100 TABLET, FILM COATED ORAL at 06:24

## 2024-11-01 RX ADMIN — NICARDIPINE HYDROCHLORIDE 25 MG/HR: 2.5 INJECTION INTRAVENOUS at 19:23

## 2024-11-01 RX ADMIN — CHLORHEXIDINE GLUCONATE 1 APPLICATION(S): 1.2 RINSE ORAL at 04:32

## 2024-11-01 RX ADMIN — CEFEPIME 100 MILLIGRAM(S): 2 INJECTION, POWDER, FOR SOLUTION INTRAVENOUS at 05:09

## 2024-11-01 RX ADMIN — NICARDIPINE HYDROCHLORIDE 25 MG/HR: 2.5 INJECTION INTRAVENOUS at 16:16

## 2024-11-01 RX ADMIN — PIPERACILLIN SODIUM AND TAZOBACTAM SODIUM 25 GRAM(S): 4; .5 INJECTION, POWDER, LYOPHILIZED, FOR SOLUTION INTRAVENOUS at 15:39

## 2024-11-01 RX ADMIN — LABETALOL 10 MILLIGRAM(S): 100 TABLET, FILM COATED ORAL at 07:34

## 2024-11-01 RX ADMIN — Medication 250 MILLIGRAM(S): at 15:16

## 2024-11-01 RX ADMIN — LABETALOL 10 MILLIGRAM(S): 100 TABLET, FILM COATED ORAL at 02:24

## 2024-11-01 RX ADMIN — Medication 4: at 18:19

## 2024-11-01 RX ADMIN — LEVETIRACETAM 1500 MILLIGRAM(S): 1000 TABLET ORAL at 08:46

## 2024-11-01 NOTE — CONSULT NOTE ADULT - ASSESSMENT
79yFemale with history of SDH s/p crani, evac and MMA embolixation, DM, HTN, MDS, CKD presents with AMS.  Patient found to have stable SDH on arrival and admitted to rule out status epilepticus.  Palliative care consulted for [facility].    Spoke with Jose over the phone. She was able to provide a medical history and hospital course. She noted that she had discuss code status with the primary team yesterday. She and her family wish for the patient to be intubated again if needed, and are OK with CPR if needed. They wish for Full Code. Role of palliative care discussed. All questions answered.    Recommendations  -Full code  -ongoing medical management  -Family ok with intubation if needed; Full Code  -follow up vEEG  -AEDs per NSICU team  -nicardipine infusion and BP control per NSICU team  -continue antibiotics - follow up infectious workup      MEDD (morphine equivalent daily dose):    Education about palliative care provided to patient/family.  See Recs below.    Please call [phone] with questions or concerns 24/7.   We will continue to follow.

## 2024-11-01 NOTE — ED ADULT NURSE NOTE - NSFALLHARMRISKINTERV_ED_ALL_ED
Assistance OOB with selected safe patient handling equipment if applicable/Assistance with ambulation/Communicate risk of Fall with Harm to all staff, patient, and family/Monitor for mental status changes and reorient to person, place, and time, as needed/Move patient closer to nursing station/within visual sight of ED staff/Provide visual cue: red socks, yellow wristband, yellow gown, etc/Reinforce activity limits and safety measures with patient and family/Toileting schedule using arm’s reach rule for commode and bathroom/Use of alarms - bed, stretcher, chair and/or video monitoring/Bed in lowest position, wheels locked, appropriate side rails in place/Call bell, personal items and telephone in reach/Instruct patient to call for assistance before getting out of bed/chair/stretcher/Non-slip footwear applied when patient is off stretcher/[place] to call system/Physically safe environment - no spills, clutter or unnecessary equipment/Purposeful Proactive Rounding/Room/bathroom lighting operational, light cord in reach

## 2024-11-01 NOTE — PATIENT PROFILE ADULT - FALL HARM RISK - HARM RISK INTERVENTIONS

## 2024-11-01 NOTE — ED ADULT NURSE REASSESSMENT NOTE - NS ED NURSE REASSESS COMMENT FT1
Patient on EEG monitoring. Daughter and son at bedside. Patient is nonverbal at this time. 1 unit PRBCs infusing. No reaction at this time, cardiac monitor and pulse oximetry in place. Pending NICU bed placement

## 2024-11-01 NOTE — PROGRESS NOTE ADULT - ASSESSMENT
79F w/ PMHx of MDS, DM, HTN, CKD, recent admission for SDH s/p crani, evac and MMA embolization, presents with decreased MS, found to have stable SDH, admitted to NSICU for r/o status epilepticus     #SDH  #R/o Status epilepticus  #HTN  #MDS  #CKD  #Fever    Plan:    Neurological:  - Neuro Checks Q1 NC  - Repeat CTH this evening- monitor increased heme in R SDH - Stable   - MR Brain when stable  - NSx following - No acute intervention  - vEEG  - S/p Keppra 1.5 g (home dose of 750 mg BID)  - Continue AED regimen: Keppra 1 g BID  - Analgesia/Sedation: Tylenol PRN  - PT/OT    Cardiovascular:  - SBP goal: 110 - 160  - Normovolemia  - Holding Home AntiHTN: lisinopril, labetalol, amlodipine  - Echo < from: TTE Echo Complete w/o Contrast w/ Doppler (10.09.24 @ 12:41) >Normal global left ventricular systolic function. Left ventricular ejection fraction, by visual estimation, is 65 to 70%  - EKG    Pulmonary:  - HOB 45  - Aspiration precautions  - Ongoing GOC discussion with family regarding airway status - low threshold to intubate, patients physical exam and mental status improving after keppra load, will monitor for now.  -  Mouth care per protocol    GI:  - NPO for airway protection  - Dysphagia screen as tolerated, likely will require an NGT  - Protonix (home medication)  - Bowel Regimen: Senna/Mirilax (when tolerating PO)  - LBM: PTA    /Renal:  - Strict Is/Os  - Bladder scan Q6H  - Sodium Goal: 135 - 145  - Replete as needed.  - Mg > 2    Endo:  - POCT, ISS,  -180  - A1c: 6  - Lipid Profile: P  - TSH: 2.63    ID: leukocytosis (seizure vs infectious)  - Trend Fever curve and WBC  - Sepsis w/u- Blood cultures, urine , CXR , procalcitonin  - Cefepime + Vanco   - F/U cultures if neg will D/C   - Normothermia    Hematology: MDS/ Leucocytosis  Chronic blood transfusions  - SCDs    MISC:   PT/OT [x]  SLP [x]  CAM-ICU[x]  CPOT/Pain score[ ] PRN Analgesia [x] Continuous Analgesia [ ]  RASS [ ] Continuous Sedation [ ]  Early Mobility [x]  Restraints [ ]  Family Updated[x]    Lines/Tubes:  PIV: x 2    Code Status:F    Dispo: NSICU     Discussed with Attending Physician: Dr Ortiz         79F w/ PMHx of MDS, DM, HTN, CKD, recent admission for SDH s/p crani, evac and MMA embolization, presents with decreased MS, found to have stable SDH, admitted to NSICU for r/o status epilepticus     #SDH  #R/o Status epilepticus  #HTN  #MDS  #CKD  #Fever    Plan:    Neurological:  - Neuro Checks Q1 NC  - Repeat CTH this evening- monitor increased heme in R SDH - Stable   - MR Brain when stable  - NSx following - No acute intervention  - vEEG  - S/p Keppra 1.5 g (home dose of 750 mg BID)  - Continue AED regimen: Keppra 1 g BID  - Analgesia/Sedation: Tylenol PRN  - PT/OT    Cardiovascular:  - SBP goal: 110 - 160  - Normovolemia  - Holding Home AntiHTN: lisinopril, labetalol, amlodipine  - Echo < from: TTE Echo Complete w/o Contrast w/ Doppler (10.09.24 @ 12:41) >Normal global left ventricular systolic function. Left ventricular ejection fraction, by visual estimation, is 65 to 70%  - EKG    Pulmonary:  - HOB 45  - Aspiration precautions  - Ongoing GOC discussion with family regarding airway status - low threshold to intubate, patients physical exam and mental status improving after keppra load, will monitor for now.  -  Mouth care per protocol    GI:  - NPO for airway protection  - Dysphagia screen as tolerated, likely will require an NGT  - Protonix (home medication)  - Bowel Regimen: Senna/Mirilax (when tolerating PO)  - LBM: PTA    /Renal:  - Strict Is/Os  - Bladder scan Q6H  - Sodium Goal: 135 - 145  - Replete as needed.  - Mg > 2    Endo:  - POCT, ISS,  -180  - A1c: 6  - Lipid Profile: P  - TSH: 2.63    ID: leukocytosis (seizure vs infectious)  - Trend Fever curve and WBC  - Sepsis w/u- Blood cultures, urine , CXR , procalcitonin  - Cefepime + Vanco - switch cefepime to to zosyn 3.375grams   - F/U cultures if neg will D/C   - Normothermia    Hematology: MDS/ Leucocytosis  Chronic blood transfusions  - SCDs    MISC:   PT/OT [x]  SLP [x]  CAM-ICU[x]  CPOT/Pain score[ ] PRN Analgesia [x] Continuous Analgesia [ ]  RASS [ ] Continuous Sedation [ ]  Early Mobility [x]  Restraints [ ]  Family Updated[x]    Lines/Tubes:  PIV: x 2    Code Status:F    Dispo: NSICU     Discussed with Attending Physician: Dr Ortiz

## 2024-11-01 NOTE — EEG REPORT - NS EEG TEXT BOX
Epilepsy Attending Note:     SIMONA KUHN    79y Female  MRN [id number]    Vital Signs Last 24 Hrs  T(C): 37.1 (2024 11:00), Max: 38 (31 Oct 2024 14:00)  T(F): 98.8 (2024 11:00), Max: 100.4 (31 Oct 2024 14:00)  HR: 99 (2024 11:00) (78 - 103)  BP: 145/63 (2024 11:00) (136/100 - 199/74)  BP(mean): 98 (2024 08:00) (98 - 100)  RR: 20 (2024 11:00) (18 - 20)  SpO2: 99% (2024 11:00) (99% - 100%)    Parameters below as of 2024 11:00  Patient On (Oxygen Delivery Method): nasal cannula  O2 Flow (L/min): 3                            7.1    7.79  )-----------( 233      ( 2024 05:39 )             22.0       11    147[H]  |  113[H]  |  28[H]  ----------------------------<  77  4.2   |  20  |  1.4    Ca    8.7      2024 05:39  Phos  5.3       Mg     1.9         TPro  4.8[L]  /  Alb  3.5  /  TBili  0.8  /  DBili  x   /  AST  23  /  ALT  31  /  AlkPhos  86        MEDICATIONS  (STANDING):  chlorhexidine 2% Cloths 1 Application(s) Topical <User Schedule>  dextrose 5%. 1000 milliLiter(s) (100 mL/Hr) IV Continuous <Continuous>  dextrose 5%. 1000 milliLiter(s) (50 mL/Hr) IV Continuous <Continuous>  dextrose 50% Injectable 25 Gram(s) IV Push once  dextrose 50% Injectable 12.5 Gram(s) IV Push once  dextrose 50% Injectable 25 Gram(s) IV Push once  glucagon  Injectable 1 milliGRAM(s) IntraMuscular once  insulin lispro (ADMELOG) corrective regimen sliding scale   SubCutaneous every 6 hours  lacosamide IVPB 50 milliGRAM(s) IV Intermittent every 12 hours  levETIRAcetam   Injectable 750 milliGRAM(s) IV Push every 12 hours  magnesium sulfate  IVPB 2 Gram(s) IV Intermittent once  niCARdipine Infusion 5 mG/Hr (25 mL/Hr) IV Continuous <Continuous>  pantoprazole    Tablet 40 milliGRAM(s) Oral before breakfast  piperacillin/tazobactam IVPB.- 3.375 Gram(s) IV Intermittent once  piperacillin/tazobactam IVPB.. 3.375 Gram(s) IV Intermittent every 8 hours  sodium chloride 0.9%. 1000 milliLiter(s) (50 mL/Hr) IV Continuous <Continuous>  vancomycin  IVPB 1000 milliGRAM(s) IV Intermittent every 24 hours    MEDICATIONS  (PRN):  dextrose Oral Gel 15 Gram(s) Oral once PRN Blood Glucose LESS THAN 70 milliGRAM(s)/deciliter  labetalol Injectable 10 milliGRAM(s) IV Push every 2 hours PRN Systolic blood pressure > 140            VEEG in the last 14 hours:    Background - continuous, less than optimally organized, reaching frequencies in the range of 4-5 Hz superimposed by abundant/almost continuous diffusely expressed periodic activity with mild shifting asymmetry that is clearly epileptiform in nature, mainly expressed from the right hemisphere /right posterior quadrant.    Focal and generalized slowin. moderate to severe generalized slowing  2. bilateral posterior quadrants focal slowing, right significantly more than left    Interictal activity:  1. generalzied discharges as above. The pattern is to some extent stimulus-dependent.   2., isolated right hemispheric spikes and polyspikes    Events - none    Seizures - none    Impression: Abnormal VEEG as above. The periodic pattern could also be induced by specific types of antibiotic therapy, such as cephalosporins.    Plan - per [facility] team

## 2024-11-01 NOTE — CONSULT NOTE ADULT - SUBJECTIVE AND OBJECTIVE BOX
CC: lethargy    HPI:  79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 While in NSICU, patient underwent MMA embolization and post operatively was stable, however, on 10/16 she was noted to have leaking from her crani site. STAT CTH showed increase in SDH and extracalvarial collection, NSG removed 25cc of blood and was re-sutured. Patient was then downgraded to the floor, was started on zosyn for UTI, and was discharged to short term rehab on 10/27. Today, patient was more lethargic and presented to the ED for evaluation. CTH showing stable SDH, CTA negative for acute stroke. NSx and NSICU consulted for management.      (31 Oct 2024 15:51)    PERTINENT PM/SXH:   DM (diabetes mellitus)    MDS (myelodysplastic syndrome)      No significant past surgical history    H/O colonoscopy      FAMILY HISTORY:    None pertinent    ITEMS NOT CHECKED ARE NOT PRESENT    SOCIAL HISTORY:   Significant other/partner[ ]  Children[ ]  Restorationism/Spirituality:  Substance hx:  [ ]   Tobacco hx:  [ ]   Alcohol hx: [ ]   Living Situation: [ ]Home  [ ]Long term care  [ x]Rehab [ ]Other  Home Services: [ ] HHA [ ] Visting RN [ ] Hospice  Occupation:  Home Opioid hx:  [ ] Y [ ] N [x ] I-Stop Reference No:    Reference #: [id number] - no meds     ADVANCE DIRECTIVES:     [x ] Full Code [ ] DNR  MOLST  [ ]  Living Will  [ ]   DECISION MAKER(s):  [ ] Health Care Proxy(s)  [ x] Surrogate(s)  [ ] Guardian           Name(s): Phone Number(s):  Spouse Waylon      BASELINE (I)ADL(s) (prior to admission):    [place]: [ ]Total  [ ] Moderate [ ]Dependent  Palliative Performance Status Version 2:         %    http://npcrc.org/files/news/palliative_performance_scale_ppsv2.pdf    Allergies    No Known Allergies    Intolerances    MEDICATIONS  (STANDING):  chlorhexidine 2% Cloths 1 Application(s) Topical <User Schedule>  dextrose 5%. 1000 milliLiter(s) (50 mL/Hr) IV Continuous <Continuous>  dextrose 5%. 1000 milliLiter(s) (100 mL/Hr) IV Continuous <Continuous>  dextrose 50% Injectable 12.5 Gram(s) IV Push once  dextrose 50% Injectable 25 Gram(s) IV Push once  dextrose 50% Injectable 25 Gram(s) IV Push once  glucagon  Injectable 1 milliGRAM(s) IntraMuscular once  insulin lispro (ADMELOG) corrective regimen sliding scale   SubCutaneous every 6 hours  lacosamide IVPB 50 milliGRAM(s) IV Intermittent every 12 hours  lacosamide IVPB 100 milliGRAM(s) IV Intermittent every 12 hours  levETIRAcetam   Injectable 750 milliGRAM(s) IV Push every 12 hours  niCARdipine Infusion 5 mG/Hr (25 mL/Hr) IV Continuous <Continuous>  pantoprazole    Tablet 40 milliGRAM(s) Oral before breakfast  piperacillin/tazobactam IVPB. 3.375 Gram(s) IV Intermittent once  piperacillin/tazobactam IVPB.- 3.375 Gram(s) IV Intermittent once  piperacillin/tazobactam IVPB.. 3.375 Gram(s) IV Intermittent every 8 hours  sodium chloride 0.9%. 1000 milliLiter(s) (50 mL/Hr) IV Continuous <Continuous>  vancomycin  IVPB 1000 milliGRAM(s) IV Intermittent every 24 hours    MEDICATIONS  (PRN):  dextrose Oral Gel 15 Gram(s) Oral once PRN Blood Glucose LESS THAN 70 milliGRAM(s)/deciliter  labetalol Injectable 10 milliGRAM(s) IV Push every 2 hours PRN Systolic blood pressure > 140    PRESENT SYMPTOMS: [ ]Unable to obtain due to poor mentation   Source if other than patient:  [ ]Family   [ ]Team     Pain: [ ]yes [ ]no  ALL PAIN ASSESSMENT COMPONENTS WERE ASKED ABOUT UNLESS OTHERWISE NOTED  QOL impact -   Location -                    Aggravating factors -  Quality -  Radiation -  Timing-  Severity (0-10 scale):  Minimal acceptable level (0-10 scale):     CPOT:    https://www.[facility]m.org/getattachment/[email or web address]/Critical-Care-Pain-Observation-Tool-(CPOT)    PAIN AD Score:   http://geriatrictoolkit.[email or web address]/painad.pdf (press ctrl +  left click to view)    Dyspnea:                           [ ]None[ ]Mild [ ]Moderate [ ]Severe     Respiratory Distress Observation Scale (RDOS):   A score of 0 to 2 signifies little or no respiratory distress, 3 signifies mild distress, scores 4 to 6 indicate moderate distress, and scores greater than 7 signify severe distress  [email or web address]://[email or web address].ca/sites/default/files/[email or web address]    Anxiety:                             [ ]None[ ]Mild [ ]Moderate [ ]Severe   Fatigue:                             [ ]None[ ]Mild [ ]Moderate [ ]Severe   Nausea:                             [ ]None[ ]Mild [ ]Moderate [ ]Severe   Loss of appetite:              [ ]None[ ]Mild [ ]Moderate [ ]Severe   Constipation:                    [ ]None[ ]Mild [ ]Moderate [ ]Severe    Other Symptoms:  [ ]All other review of systems negative     Palliative Performance Status Version 2:         %    [email or web address]://[email or web address].org/files/news/palliative_performance_scale_[email or web address].pdf    PHYSICAL EXAM:  Vital Signs Last 24 Hrs  T(C): 37.3 (01 Nov 2024 03:00), Max: 38 (31 Oct 2024 14:00)  T(F): 99.2 (01 Nov 2024 03:00), Max: 100.4 (31 Oct 2024 14:00)  HR: 81 (01 Nov 2024 09:00) (78 - 103)  BP: 196/69 (01 Nov 2024 09:00) (136/100 - 196/69)  BP(mean): 98 (01 Nov 2024 08:00) (98 - 100)  RR: 18 (01 Nov 2024 09:00) (18 - 20)  SpO2: 100% (01 Nov 2024 09:00) (100% - 100%)    Parameters below as of 01 Nov 2024 09:00  Patient On (Oxygen Delivery Method): nasal cannula  O2 Flow (L/min): 3   I&O's Summary      GENERAL:  [ ] No acute distress [ ]Lethargic  [ ]Unarousable  [ ]Verbal  [ ]Non-Verbal [ ]Cachexia    BEHAVIORAL/PSYCH:  [ ]Alert and Oriented x  [ ] Anxiety [ ] Delirium [ ] Agitation [ ] Calm   EYES: [ ] No scleral icterus [ ] Scleral icterus [ ] Closed  ENMT:  [ ]Dry mouth  [ ]No external oral lesions [ ] No external ear or nose lesions  CARDIOVASCULAR:  [ ]Regular [ ]Irregular [ ]Tachy [ ]Not Tachy  [ ][place] [ ] Edema [ ] No edema  PULMONARY:  [ ]Tachypnea  [ ]Audible excessive secretions [ ] No labored breathing [ ] labored breathing  GASTROINTESTINAL: [ ]Soft  [ ]Distended  [ ]Not distended [ ]Non tender [ ]Tender  MUSCULOSKELETAL: [ ]No clubbing [ ] clubbing  [ ] No cyanosis [ ] cyanosis  NEUROLOGIC: [ ]No focal deficits  [ ]Follows commands  [ ]Does not follow commands  [ ]Cognitive impairment  [ ]Dysphagia  [ ]Dysarthria  [ ]Paresis   SKIN: [ ] Jaundiced [ ] Non-jaundiced [ ]Rash [ ]No Rash [ ] Warm [ ] Dry  MISC/LINES: [ ] ET tube [ ] Trach [ ]NGT/OGT [ ]PEG [ ]Small    LABS: reviewed by me                        7.1    7.79  )-----------( 233      ( 01 Nov 2024 05:39 )             22.0   11-01    147[H]  |  113[H]  |  28[H]  ----------------------------<  77  4.2   |  20  |  1.4    Ca    8.7      01 Nov 2024 05:39  Phos  5.3     11-01  Mg     1.9     11-01    TPro  4.8[L]  /  Alb  3.5  /  TBili  0.8  /  DBili  x   /  AST  23  /  ALT  31  /  AlkPhos  86  11-01  PT/INR - ( 31 Oct 2024 12:05 )   PT: 12.70 sec;   INR: 1.07 ratio         PTT - ( 31 Oct 2024 12:05 )  PTT:26.4 sec    Urinalysis Basic - ( 01 Nov 2024 05:39 )    Color: x / Appearance: x / SG: x / pH: x  Gluc: 77 mg/dL / Ketone: x  / Bili: x / Urobili: x   Blood: x / Protein: x / Nitrite: x   Leuk Esterase: x / RBC: x / WBC x   Sq Epi: x / Non Sq Epi: x / Bacteria: x      RADIOLOGY & ADDITIONAL STUDIES: reviewed by me    EKG: reviewed by me      PROTEIN CALORIE MALNUTRITION PRESENT: [ ]mild [ ]moderate [ ]severe [ ]underweight [ ]morbid obesity  https://www.andeal.org/vault/2440/web/files/ONC/Table_Clinical%20Characteristics%20to%20Document%20Malnutrition-White%20JV%20et%20al%202012.pdf    Height (cm): 162.6 (10-31-24 @ 11:28), 162.6 (10-09-24 @ 17:30), 157.5 (05-23-24 @ 09:30)  Weight (kg): 61.235 (10-31-24 @ 11:28), 64.3 (10-09-24 @ 17:30), 61.2 (05-23-24 @ 09:30)  BMI (kg/m2): 23.2 (10-31-24 @ 11:28), 24.3 (10-09-24 @ 17:30), 24.7 (05-23-24 @ 09:30)  [ ]PPSV2 < or = to 30% [ ]significant weight loss  [ ]poor nutritional intake  [ ]anasarca      [ ]Artificial Nutrition      Palliative Care Spiritual/Emotional Screening Tool Question  Severity (0-4):                    OR                    [ x] Unable to determine. Will assess at later time if appropriate.  Score of 2 or greater indicates recommendation of Chaplaincy and/or SW referral  Chaplaincy Referral: [ ] Yes [ ] Refused [ ] Following     Caregiver [place]:  [ ] Yes [ ] No    OR    [x ] Unable to determine. Will assess at later time if appropriate.  Social Work Referral [ ]  Patient and Family Centered Care Referral [ ]    Anticipatory Grief Present: [ ] Yes [ ] No    OR     [ x] Unable to determine. Will assess at later time if appropriate.  Social Work Referral [ ]  Patient and Family Centered Care Referral [ ]    Patient discussed with primary medical team MD  Palliative care education provided to patient and/or family   CC: lethargy    HPI:  79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 While in NSICU, patient underwent MMA embolization and post operatively was stable, however, on 10/16 she was noted to have leaking from her crani site. STAT CTH showed increase in SDH and extracalvarial collection, NSG removed 25cc of blood and was re-sutured. Patient was then downgraded to the floor, was started on zosyn for UTI, and was discharged to short term rehab on 10/27. Today, patient was more lethargic and presented to the ED for evaluation. CTH showing stable SDH, CTA negative for acute stroke. NSx and NSICU consulted for management.      (31 Oct 2024 15:51)    PERTINENT PM/SXH:   DM (diabetes mellitus)    MDS (myelodysplastic syndrome)      No significant past surgical history    H/O colonoscopy      FAMILY HISTORY:    None pertinent    ITEMS NOT CHECKED ARE NOT PRESENT    SOCIAL HISTORY:   Significant other/partner[ ]  Children[ ]  Church/Spirituality:  Substance hx:  [ ]   Tobacco hx:  [ ]   Alcohol hx: [ ]   Living Situation: [ ]Home  [ ]Long term care  [ x]Rehab [ ]Other  Home Services: [ ] HHA [ ] Visting RN [ ] Hospice  Occupation:  Home Opioid hx:  [ ] Y [ ] N [x ] I-Stop Reference No:    Reference #: [id number] - no meds     ADVANCE DIRECTIVES:     [x ] Full Code [ ] DNR  MOLST  [ ]  Living Will  [ ]   DECISION MAKER(s):  [ ] Health Care Proxy(s)  [ x] Surrogate(s)  [ ] Guardian           Name(s): Phone Number(s):  Spouse Waylon with children      BASELINE (I)ADL(s) (prior to admission):    [place]: [ ]Total  [ ] Moderate [ ]Dependent  Palliative Performance Status Version 2:         %    http://npcrc.org/files/news/palliative_performance_scale_ppsv2.pdf    Allergies    No Known Allergies    Intolerances    MEDICATIONS  (STANDING):  chlorhexidine 2% Cloths 1 Application(s) Topical <User Schedule>  dextrose 5%. 1000 milliLiter(s) (50 mL/Hr) IV Continuous <Continuous>  dextrose 5%. 1000 milliLiter(s) (100 mL/Hr) IV Continuous <Continuous>  dextrose 50% Injectable 12.5 Gram(s) IV Push once  dextrose 50% Injectable 25 Gram(s) IV Push once  dextrose 50% Injectable 25 Gram(s) IV Push once  glucagon  Injectable 1 milliGRAM(s) IntraMuscular once  insulin lispro (ADMELOG) corrective regimen sliding scale   SubCutaneous every 6 hours  lacosamide IVPB 50 milliGRAM(s) IV Intermittent every 12 hours  lacosamide IVPB 100 milliGRAM(s) IV Intermittent every 12 hours  levETIRAcetam   Injectable 750 milliGRAM(s) IV Push every 12 hours  niCARdipine Infusion 5 mG/Hr (25 mL/Hr) IV Continuous <Continuous>  pantoprazole    Tablet 40 milliGRAM(s) Oral before breakfast  piperacillin/tazobactam IVPB. 3.375 Gram(s) IV Intermittent once  piperacillin/tazobactam IVPB.- 3.375 Gram(s) IV Intermittent once  piperacillin/tazobactam IVPB.. 3.375 Gram(s) IV Intermittent every 8 hours  sodium chloride 0.9%. 1000 milliLiter(s) (50 mL/Hr) IV Continuous <Continuous>  vancomycin  IVPB 1000 milliGRAM(s) IV Intermittent every 24 hours    MEDICATIONS  (PRN):  dextrose Oral Gel 15 Gram(s) Oral once PRN Blood Glucose LESS THAN 70 milliGRAM(s)/deciliter  labetalol Injectable 10 milliGRAM(s) IV Push every 2 hours PRN Systolic blood pressure > 140    PRESENT SYMPTOMS: [ x]Unable to obtain due to poor mentation   Source if other than patient:  [ ]Family   [ ]Team     Pain: [ ]yes [ ]no  ALL PAIN ASSESSMENT COMPONENTS WERE ASKED ABOUT UNLESS OTHERWISE NOTED  QOL impact -   Location -                    Aggravating factors -  Quality -  Radiation -  Timing-  Severity (0-10 scale):  Minimal acceptable level (0-10 scale):     CPOT:  0  https://www.[facility].org/getattachment/[email or web address]/Critical-Care-Pain-Observation-Tool-(CPOT)    PAIN AD Score:   http://geriatrictoolkit.[facility]/cog/painad.pdf (press ctrl +  left click to view)    Dyspnea:                           [ ]None[ ]Mild [ ]Moderate [ ]Severe     Respiratory Distress Observation Scale (RDOS): 0  A score of 0 to 2 signifies little or no respiratory distress, 3 signifies mild distress, scores 4 to 6 indicate moderate distress, and scores greater than 7 signify severe distress  [email or web address]://www.[facility].ca/sites/default/files/PDFS/[email or web address]-mftczrvihxg-iyfcyfzd-orbimdlnsin-fjxje.pdf    Anxiety:                             [ ]None[ ]Mild [ ]Moderate [ ]Severe   Fatigue:                             [ ]None[ ]Mild [ ]Moderate [ ]Severe   Nausea:                             [ ]None[ ]Mild [ ]Moderate [ ]Severe   Loss of appetite:              [ ]None[ ]Mild [ ]Moderate [ ]Severe   Constipation:                    [ ]None[ ]Mild [ ]Moderate [ ]Severe    Other Symptoms:  [ ]All other review of systems negative     Palliative Performance Status Version 2:         20%    http://[facility].org/files/news/palliative_performance_scale_[email or web address].pdf    PHYSICAL EXAM:  Vital Signs Last 24 Hrs  T(C): 37.3 (01 Nov 2024 03:00), Max: 38 (31 Oct 2024 14:00)  T(F): 99.2 (01 Nov 2024 03:00), Max: 100.4 (31 Oct 2024 14:00)  HR: 81 (01 Nov 2024 09:00) (78 - 103)  BP: 196/69 (01 Nov 2024 09:00) (136/100 - 196/69)  BP(mean): 98 (01 Nov 2024 08:00) (98 - 100)  RR: 18 (01 Nov 2024 09:00) (18 - 20)  SpO2: 100% (01 Nov 2024 09:00) (100% - 100%)    Parameters below as of 01 Nov 2024 09:00  Patient On (Oxygen Delivery Method): nasal cannula  O2 Flow (L/min): 3   I&O's Summary      GENERAL:  [ ] No acute distress [ x]Lethargic  [ ]Unarousable  [ ]Verbal  [ ]Non-Verbal [ ]Cachexia    BEHAVIORAL/PSYCH:  [ ]Alert and Oriented x  [ ] Anxiety [ ] Delirium [ ] Agitation [ ] Calm   EYES: [ ] No scleral icterus [ ] Scleral icterus [ ] Closed  ENMT:  [ ]Dry mouth  [ x]No external oral lesions [ ] No external ear or nose lesions  CARDIOVASCULAR:  [ ]Regular [ ]Irregular [ ]Tachy [ ]Not Tachy  [ ][place] [ ] Edema [ ] No edema  PULMONARY:  [ ]Tachypnea  [ ]Audible excessive secretions [x ] No labored breathing [ ] labored breathing  GASTROINTESTINAL: [ ]Soft  [ ]Distended  [ ]Not distended [ ]Non tender [ ]Tender  MUSCULOSKELETAL: [ ]No clubbing [ ] clubbing  [ ] No cyanosis [ ] cyanosis  NEUROLOGIC: [ ]No focal deficits  [ ]Follows commands  [ x]Does not follow commands  [x ]Cognitive impairment  [ ]Dysphagia  [ ]Dysarthria  [ ]Paresis   SKIN: [ ] Jaundiced [ ] Non-jaundiced [ ]Rash [ ]No Rash [ ] Warm [ ] Dry  MISC/LINES: [ ] ET tube [ ] Trach [ ]NGT/OGT [ ]PEG [ ]Small    LABS: reviewed by me                        7.1    7.79  )-----------( 233      ( 01 Nov 2024 05:39 )             22.0   11-01    147[H]  |  113[H]  |  28[H]  ----------------------------<  77  4.2   |  20  |  1.4    Ca    8.7      01 Nov 2024 05:39  Phos  5.3     11-01  Mg     1.9     11-01    TPro  4.8[L]  /  Alb  3.5  /  TBili  0.8  /  DBili  x   /  AST  23  /  ALT  31  /  AlkPhos  86  11-01  PT/INR - ( 31 Oct 2024 12:05 )   PT: 12.70 sec;   INR: 1.07 ratio         PTT - ( 31 Oct 2024 12:05 )  PTT:26.4 sec    Urinalysis Basic - ( 01 Nov 2024 05:39 )    Color: x / Appearance: x / SG: x / pH: x  Gluc: 77 mg/dL / Ketone: x  / Bili: x / Urobili: x   Blood: x / Protein: x / Nitrite: x   Leuk Esterase: x / RBC: x / WBC x   Sq Epi: x / Non Sq Epi: x / Bacteria: x      RADIOLOGY & ADDITIONAL STUDIES: reviewed by me    < from: CT Angio Head w/ IV Cont (10.31.24 @ 12:58) >  IMPRESSION:  CT HEAD:  No significant change in appearance of a residual mixed density right   hemispheric subdural hematoma status post craniotomy for evacuation with   minimal leftward midline shift.    Similar focal vasogenic edema within the right parietal lobe which may   reflect subacute infarct or traumatic contusion.    CTA HEAD:  No evidence of flow-limiting stenosis, occlusion or aneurysm.    CTA NECK:  No evidence of carotid or vertebral artery stenosis.    Large bilateral pleural effusions.    < end of copied text >      EKG: reviewed by me    < from: 12 Lead ECG (10.31.24 @ 14:57) >    Ventricular Rate 99 BPM    Atrial Rate 99 BPM    P-R Interval 134 ms    QRS Duration 80 ms    Q-T Interval 310 ms    QTC Calculation(Bazett) 397 ms    P Axis 46 degrees    R Axis 41 degrees    T Axis -26 degrees    Diagnosis Line Normal sinus rhythm  Cannot rule out Anterior infarct , age undetermined  Abnormal ECG    < end of copied text >      PROTEIN CALORIE MALNUTRITION PRESENT: [ ]mild [ ]moderate [ ]severe [ ]underweight [ ]morbid obesity  https://www.andeal.org/vault/2440/web/files/ONC/Table_Clinical%20Characteristics%20to%20Document%20Malnutrition-White%20JV%20et%20al%164852.pdf    Height (cm): 162.6 (10-31-24 @ 11:28), 162.6 (10-09-24 @ 17:30), 157.5 (05-23-24 @ 09:30)  Weight (kg): 61.235 (10-31-24 @ 11:28), 64.3 (10-09-24 @ 17:30), 61.2 (05-23-24 @ 09:30)  BMI (kg/m2): 23.2 (10-31-24 @ 11:28), 24.3 (10-09-24 @ 17:30), 24.7 (05-23-24 @ 09:30)  [ ]PPSV2 < or = to 30% [ ]significant weight loss  [ ]poor nutritional intake  [ ]anasarca      [ ]Artificial Nutrition      Palliative Care Spiritual/Emotional Screening Tool Question  Severity (0-4):                    OR                    [ x] Unable to determine. Will assess at later time if appropriate.  Score of 2 or greater indicates recommendation of Chaplaincy and/or SW referral  Chaplaincy Referral: [ ] Yes [ ] Refused [ ] Following     Caregiver [place]:  [ ] Yes [ ] No    OR    [x ] Unable to determine. Will assess at later time if appropriate.  Social Work Referral [ ]  Patient and Family Centered Care Referral [ ]    Anticipatory Grief Present: [ ] Yes [ ] No    OR     [ x] Unable to determine. Will assess at later time if appropriate.  Social Work Referral [ ]  Patient and Family Centered Care Referral [ ]    Patient discussed with primary medical team MD  Palliative care education provided to patient and/or family

## 2024-11-01 NOTE — CONSULT NOTE ADULT - CONVERSATION DETAILS
Spoke with Jose over the phone. She was able to provide a medical history and hospital course. She noted that she had discuss code status with the primary team yesterday. She and her family wish for the patient to be intubated again if needed, and are OK with CPR if needed. They wish for Full Code. Role of palliative care discussed. All questions answered.

## 2024-11-01 NOTE — ED ADULT NURSE REASSESSMENT NOTE - NS ED NURSE REASSESS COMMENT FT1
Called pharmacy [phone] for Lacosamide IVPB, pharmacist stated that they are backed up and to come  medication in an hour. MD notified

## 2024-11-01 NOTE — PROGRESS NOTE ADULT - SUBJECTIVE AND OBJECTIVE BOX
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 While in NSICU, patient underwent MMA embolization and post operatively was stable, however, on 10/16 she was noted to have leaking from her crani site. STAT CTH showed increase in SDH and extracalvarial collection, NSG removed 25cc of blood and was re-sutured. Patient was then downgraded to the floor, was started on zosyn for UTI, and was discharged to short term rehab on 10/27. Today, patient was more lethargic and presented to the ED for evaluation. CTH showing stable SDH, CTA negative for acute stroke. NSx and NSICU consulted for management.     REVIEW OF SYSTEMS: [X ] Unable to Assess due to neurologic exam     Neuro: [ ] Headache [ ] Back pain [ ] Numbness [ ] Weakness [ ] Ataxia [ ] Dizziness [ ] Aphasia [ ] Dysarthria [ ] Visual disturbance  Resp: [ ] Shortness of breath/dyspnea, [ ] Orthopnea [ ] Cough  CV: [ ] Chest pain [ ] Palpitation [ ] Lightheadedness [ ] Syncope  Renal: [ ] Thirst [ ] Edema  GI: [ ] Nausea [ ] Emesis [ ] Abdominal pain [ ] Constipation [ ] Diarrhea  Hem: [ ] Hematemesis [ ] bright red blood per rectum  ID: [ ] Fever [ ] Chills [ ] Dysuria  ENT: [ ] Rhinorrhea    ICU Vital Signs Last 24 Hrs  T(C): 37.3 (01 Nov 2024 03:00), Max: 38 (31 Oct 2024 14:00)  T(F): 99.2 (01 Nov 2024 03:00), Max: 100.4 (31 Oct 2024 14:00)  HR: 86 (01 Nov 2024 07:00) (80 - 103)  BP: 191/77 (01 Nov 2024 07:25) (136/100 - 196/66)  BP(mean): 100 (01 Nov 2024 07:25) (100 - 100)  RR: 18 (01 Nov 2024 07:00) (18 - 20)  SpO2: 100% (01 Nov 2024 07:00) (100% - 100%)    O2 Parameters below as of 01 Nov 2024 07:00  Patient On (Oxygen Delivery Method): nasal cannula  O2 Flow (L/min): 4    MEDICATIONS  (STANDING):  cefepime   IVPB 1000 milliGRAM(s) IV Intermittent every 12 hours  chlorhexidine 2% Cloths 1 Application(s) Topical <User Schedule>  dextrose 5%. 1000 milliLiter(s) (50 mL/Hr) IV Continuous <Continuous>  dextrose 5%. 1000 milliLiter(s) (100 mL/Hr) IV Continuous <Continuous>  dextrose 50% Injectable 12.5 Gram(s) IV Push once  dextrose 50% Injectable 25 Gram(s) IV Push once  dextrose 50% Injectable 25 Gram(s) IV Push once  glucagon  Injectable 1 milliGRAM(s) IntraMuscular once  insulin lispro (ADMELOG) corrective regimen sliding scale   SubCutaneous every 6 hours  levETIRAcetam   Injectable 1500 milliGRAM(s) IV Push every 12 hours  pantoprazole    Tablet 40 milliGRAM(s) Oral before breakfast  sodium chloride 0.9%. 1000 milliLiter(s) (50 mL/Hr) IV Continuous <Continuous>  vancomycin  IVPB 1000 milliGRAM(s) IV Intermittent every 24 hours    MEDICATIONS  (PRN):  dextrose Oral Gel 15 Gram(s) Oral once PRN Blood Glucose LESS THAN 70 milliGRAM(s)/deciliter  labetalol Injectable 10 milliGRAM(s) IV Push every 2 hours PRN Systolic blood pressure > 140      11-01    147[H]  |  113[H]  |  28[H]  ----------------------------<  77  4.2   |  20  |  1.4    Ca    8.7      01 Nov 2024 05:39  Phos  5.3     11-01  Mg     1.9     11-01    TPro  4.8[L]  /  Alb  3.5  /  TBili  0.8  /  DBili  x   /  AST  23  /  ALT  31  /  AlkPhos  86  11-01                            7.1    7.79  )-----------( 233      ( 01 Nov 2024 05:39 )             22.0         Urinalysis with Rflx Culture (collected 01 Nov 2024 02:50)     CT Head No Cont (11.01.24 @ 00:20) >    IMPRESSION:    Essentially stable exam compared to CT head from 10/31/2024.    < end of copied text >     Xray Chest 1 View-PORTABLE IMMEDIATE (10.31.24 @ 14:30) >  IMPRESSION:    Unchanged bibasilar opacities and effusions.     CT Angio Neck w/ IV Cont (10.31.24 @ 12:59) >  IMPRESSION:  CT HEAD:  No significant change in appearance of a residual mixed density right   hemispheric subdural hematoma status post craniotomy for evacuation with   minimal leftward midline shift.    Similar focal vasogenic edema within the right parietal lobe which may   reflect subacute infarct or traumatic contusion.    CTA HEAD:  No evidence of flow-limiting stenosis, occlusion or aneurysm.    CTA NECK:  No evidence of carotid or vertebral artery stenosis.    Large bilateral pleural effusions.      Physical Exam: PHYSICAL EXAM:  GENERAL: well built, well nourished  EYES: EOMI, PERRLA, conjunctiva and sclera clear  NECK: Supple, No JVD,   CHEST/LUNG:   HEART: S1S2 normal, no S3, Regular rate and rhythm; No murmurs, rubs, or gallops  ABDOMEN: Soft, Nontender, Nondistended; Bowel sounds present  EXTREMITIES:  2+ Peripheral Pulses, No clubbing, cyanosis, or edema  LYMPH: No lymphadenopathy noted  SKIN: No rashes or lesions  NEURO: GCS 4, briefly opens eyes to noxious stimuli, right gaze preference but able to cross midline, +beating nystagmus, PERRLA, weak gag/cough, B/L UE withdrawal to noxious with R> L, B/L LE WD to

## 2024-11-01 NOTE — ED ADULT NURSE NOTE - CHIEF COMPLAINT QUOTE
Pt BIBA  from [facility] presents with  AMS and fevers since yesterday. FS at the NH 50, PO glucagon x1 given. FS in triage 113. pt confused at baseline, but responsive, pt more lethargic today. pt on 5LNC.

## 2024-11-01 NOTE — ED ADULT NURSE REASSESSMENT NOTE - NS ED NURSE REASSESS COMMENT FT1
Pt type + screen sent, blood bank received said that PRBC should be ready within an hour of the blood being resulted. LIZBETH malagon.

## 2024-11-02 LAB
ALBUMIN SERPL ELPH-MCNC: 3.5 G/DL — SIGNIFICANT CHANGE UP (ref 3.5–5.2)
ALP SERPL-CCNC: 84 U/L — SIGNIFICANT CHANGE UP (ref 30–115)
ALT FLD-CCNC: 25 U/L — SIGNIFICANT CHANGE UP (ref 0–41)
ANION GAP SERPL CALC-SCNC: 14 MMOL/L — SIGNIFICANT CHANGE UP (ref 7–14)
AST SERPL-CCNC: 20 U/L — SIGNIFICANT CHANGE UP (ref 0–41)
BASOPHILS # BLD AUTO: 0.14 K/UL — SIGNIFICANT CHANGE UP (ref 0–0.2)
BASOPHILS NFR BLD AUTO: 1.6 % — HIGH (ref 0–1)
BILIRUB SERPL-MCNC: 1.2 MG/DL — SIGNIFICANT CHANGE UP (ref 0.2–1.2)
BUN SERPL-MCNC: 33 MG/DL — HIGH (ref 10–20)
CALCIUM SERPL-MCNC: 9 MG/DL — SIGNIFICANT CHANGE UP (ref 8.4–10.5)
CHLORIDE SERPL-SCNC: 114 MMOL/L — HIGH (ref 98–110)
CO2 SERPL-SCNC: 18 MMOL/L — SIGNIFICANT CHANGE UP (ref 17–32)
CREAT SERPL-MCNC: 1.4 MG/DL — SIGNIFICANT CHANGE UP (ref 0.7–1.5)
EGFR: 39 ML/MIN/1.73M2 — LOW
EOSINOPHIL # BLD AUTO: 0.48 K/UL — SIGNIFICANT CHANGE UP (ref 0–0.7)
EOSINOPHIL NFR BLD AUTO: 5.4 % — SIGNIFICANT CHANGE UP (ref 0–8)
GLUCOSE BLDC GLUCOMTR-MCNC: 132 MG/DL — HIGH (ref 70–99)
GLUCOSE BLDC GLUCOMTR-MCNC: 163 MG/DL — HIGH (ref 70–99)
GLUCOSE BLDC GLUCOMTR-MCNC: 175 MG/DL — HIGH (ref 70–99)
GLUCOSE BLDC GLUCOMTR-MCNC: 80 MG/DL — SIGNIFICANT CHANGE UP (ref 70–99)
GLUCOSE SERPL-MCNC: 73 MG/DL — SIGNIFICANT CHANGE UP (ref 70–99)
HCT VFR BLD CALC: 24.9 % — LOW (ref 37–47)
HGB BLD-MCNC: 8.3 G/DL — LOW (ref 12–16)
IMM GRANULOCYTES NFR BLD AUTO: 0.9 % — HIGH (ref 0.1–0.3)
LYMPHOCYTES # BLD AUTO: 1.29 K/UL — SIGNIFICANT CHANGE UP (ref 1.2–3.4)
LYMPHOCYTES # BLD AUTO: 14.6 % — LOW (ref 20.5–51.1)
MAGNESIUM SERPL-MCNC: 1.9 MG/DL — SIGNIFICANT CHANGE UP (ref 1.8–2.4)
MCHC RBC-ENTMCNC: 30.3 PG — SIGNIFICANT CHANGE UP (ref 27–31)
MCHC RBC-ENTMCNC: 33.3 G/DL — SIGNIFICANT CHANGE UP (ref 32–37)
MCV RBC AUTO: 90.9 FL — SIGNIFICANT CHANGE UP (ref 81–99)
MONOCYTES # BLD AUTO: 1.12 K/UL — HIGH (ref 0.1–0.6)
MONOCYTES NFR BLD AUTO: 12.7 % — HIGH (ref 1.7–9.3)
MRSA PCR RESULT.: NEGATIVE — SIGNIFICANT CHANGE UP
NEUTROPHILS # BLD AUTO: 5.7 K/UL — SIGNIFICANT CHANGE UP (ref 1.4–6.5)
NEUTROPHILS NFR BLD AUTO: 64.8 % — SIGNIFICANT CHANGE UP (ref 42.2–75.2)
NRBC # BLD: 0 /100 WBCS — SIGNIFICANT CHANGE UP (ref 0–0)
PHOSPHATE SERPL-MCNC: 5.4 MG/DL — HIGH (ref 2.1–4.9)
PLATELET # BLD AUTO: 213 K/UL — SIGNIFICANT CHANGE UP (ref 130–400)
PMV BLD: 10.5 FL — HIGH (ref 7.4–10.4)
POTASSIUM SERPL-MCNC: 4.3 MMOL/L — SIGNIFICANT CHANGE UP (ref 3.5–5)
POTASSIUM SERPL-SCNC: 4.3 MMOL/L — SIGNIFICANT CHANGE UP (ref 3.5–5)
PROT SERPL-MCNC: 4.9 G/DL — LOW (ref 6–8)
RBC # BLD: 2.74 M/UL — LOW (ref 4.2–5.4)
RBC # FLD: 15.3 % — HIGH (ref 11.5–14.5)
SODIUM SERPL-SCNC: 146 MMOL/L — SIGNIFICANT CHANGE UP (ref 135–146)
WBC # BLD: 8.81 K/UL — SIGNIFICANT CHANGE UP (ref 4.8–10.8)
WBC # FLD AUTO: 8.81 K/UL — SIGNIFICANT CHANGE UP (ref 4.8–10.8)

## 2024-11-02 PROCEDURE — 95720 EEG PHY/QHP EA INCR W/VEEG: CPT

## 2024-11-02 PROCEDURE — 99233 SBSQ HOSP IP/OBS HIGH 50: CPT

## 2024-11-02 PROCEDURE — 71045 X-RAY EXAM CHEST 1 VIEW: CPT | Mod: 26

## 2024-11-02 RX ORDER — LEVETIRACETAM 1000 MG/1
750 TABLET ORAL EVERY 12 HOURS
Refills: 0 | Status: DISCONTINUED | OUTPATIENT
Start: 2024-11-02 | End: 2024-11-12

## 2024-11-02 RX ORDER — NICARDIPINE HYDROCHLORIDE 2.5 MG/ML
5 INJECTION INTRAVENOUS
Qty: 40 | Refills: 0 | Status: DISCONTINUED | OUTPATIENT
Start: 2024-11-02 | End: 2024-11-08

## 2024-11-02 RX ORDER — FUROSEMIDE 40 MG/1
10 TABLET ORAL ONCE
Refills: 0 | Status: DISCONTINUED | OUTPATIENT
Start: 2024-11-02 | End: 2024-11-02

## 2024-11-02 RX ORDER — LACOSAMIDE 10 MG/ML
50 INJECTION INTRAVENOUS EVERY 12 HOURS
Refills: 0 | Status: DISCONTINUED | OUTPATIENT
Start: 2024-11-02 | End: 2024-11-08

## 2024-11-02 RX ORDER — PANTOPRAZOLE SODIUM 40 MG/1
40 TABLET, DELAYED RELEASE ORAL DAILY
Refills: 0 | Status: DISCONTINUED | OUTPATIENT
Start: 2024-11-03 | End: 2024-11-06

## 2024-11-02 RX ORDER — PANTOPRAZOLE SODIUM 40 MG/1
40 TABLET, DELAYED RELEASE ORAL DAILY
Refills: 0 | Status: DISCONTINUED | OUTPATIENT
Start: 2024-11-02 | End: 2024-11-02

## 2024-11-02 RX ORDER — KETAMINE HCL 50MG/ML(1)
0.75 SYRINGE (ML) INTRAVENOUS
Qty: 1000 | Refills: 0 | Status: DISCONTINUED | OUTPATIENT
Start: 2024-11-02 | End: 2024-11-02

## 2024-11-02 RX ORDER — LABETALOL 100 MG/1
50 TABLET, FILM COATED ORAL EVERY 8 HOURS
Refills: 0 | Status: DISCONTINUED | OUTPATIENT
Start: 2024-11-02 | End: 2024-11-03

## 2024-11-02 RX ORDER — FUROSEMIDE 40 MG/1
20 TABLET ORAL ONCE
Refills: 0 | Status: COMPLETED | OUTPATIENT
Start: 2024-11-02 | End: 2024-11-02

## 2024-11-02 RX ORDER — LISINOPRIL 20 MG/1
20 TABLET ORAL DAILY
Refills: 0 | Status: DISCONTINUED | OUTPATIENT
Start: 2024-11-02 | End: 2024-11-11

## 2024-11-02 RX ORDER — KETAMINE HCL 50MG/ML(1)
1 SYRINGE (ML) INTRAVENOUS
Qty: 1000 | Refills: 0 | Status: DISCONTINUED | OUTPATIENT
Start: 2024-11-02 | End: 2024-11-03

## 2024-11-02 RX ORDER — LABETALOL 100 MG/1
50 TABLET, FILM COATED ORAL EVERY 8 HOURS
Refills: 0 | Status: DISCONTINUED | OUTPATIENT
Start: 2024-11-02 | End: 2024-11-02

## 2024-11-02 RX ADMIN — PANTOPRAZOLE SODIUM 40 MILLIGRAM(S): 40 TABLET, DELAYED RELEASE ORAL at 11:51

## 2024-11-02 RX ADMIN — Medication 3.32 MG/KG/HR: at 14:17

## 2024-11-02 RX ADMIN — Medication 25 GRAM(S): at 09:04

## 2024-11-02 RX ADMIN — SODIUM CHLORIDE 50 MILLILITER(S): 9 INJECTION, SOLUTION INTRAMUSCULAR; INTRAVENOUS; SUBCUTANEOUS at 09:00

## 2024-11-02 RX ADMIN — PIPERACILLIN SODIUM AND TAZOBACTAM SODIUM 25 GRAM(S): 4; .5 INJECTION, POWDER, LYOPHILIZED, FOR SOLUTION INTRAVENOUS at 06:39

## 2024-11-02 RX ADMIN — Medication 2: at 17:48

## 2024-11-02 RX ADMIN — LACOSAMIDE 100 MILLIGRAM(S): 10 INJECTION INTRAVENOUS at 05:48

## 2024-11-02 RX ADMIN — PIPERACILLIN SODIUM AND TAZOBACTAM SODIUM 25 GRAM(S): 4; .5 INJECTION, POWDER, LYOPHILIZED, FOR SOLUTION INTRAVENOUS at 21:42

## 2024-11-02 RX ADMIN — LABETALOL 50 MILLIGRAM(S): 100 TABLET, FILM COATED ORAL at 21:42

## 2024-11-02 RX ADMIN — CHLORHEXIDINE GLUCONATE 1 APPLICATION(S): 1.2 RINSE ORAL at 05:51

## 2024-11-02 RX ADMIN — LEVETIRACETAM 750 MILLIGRAM(S): 1000 TABLET ORAL at 17:47

## 2024-11-02 RX ADMIN — PIPERACILLIN SODIUM AND TAZOBACTAM SODIUM 25 GRAM(S): 4; .5 INJECTION, POWDER, LYOPHILIZED, FOR SOLUTION INTRAVENOUS at 14:16

## 2024-11-02 RX ADMIN — LACOSAMIDE 50 MILLIGRAM(S): 10 INJECTION INTRAVENOUS at 17:51

## 2024-11-02 RX ADMIN — LABETALOL 50 MILLIGRAM(S): 100 TABLET, FILM COATED ORAL at 14:17

## 2024-11-02 RX ADMIN — LISINOPRIL 20 MILLIGRAM(S): 20 TABLET ORAL at 12:39

## 2024-11-02 RX ADMIN — FUROSEMIDE 20 MILLIGRAM(S): 40 TABLET ORAL at 11:48

## 2024-11-02 RX ADMIN — LEVETIRACETAM 750 MILLIGRAM(S): 1000 TABLET ORAL at 05:48

## 2024-11-02 RX ADMIN — Medication 4.98 MG/KG/HR: at 18:12

## 2024-11-02 NOTE — EEG REPORT - NS EEG TEXT BOX
TECH INFORMATION:   This is a Continuous Video EEG.     Recording Technique: The patient underwent continuous video-EEG monitoring, using Telemetry System hardware on the [employer].  EEG and video data were stored on a computer hard drive with important events saved in digital archive files. The material was reviewed by a physician (electroencephalographer/epileptologist) on a daily basis.  Yadiel and seizure detection algorithms were utilized and reviewed.  An EEG Technician attended to the patient for 8 to 10 hours per day. The patient was observed by the epilepsy nursing staff 24 hours per day. The epilepsy center neurologist was available in person or on call 24 hours per day..     Placement and Labeling of Electrodes: The EEG was performed utilizing at least 20 channel referential EEG connections (coronal over temporal over parasagittal montage) with inferior temporal electrodes when indicting and using all standard 10-20 electrode placements with EKG, with additional electrodes placed in the inferior temporal region using the modified 10-10 montage electrode placements for elective admissions, or if deemed necessary.  Recording was at  a sampling rate of 256 samples per second per channel. Time synchronized digital video recording was done simultaneously with EEG recording.  A low light infrared camera was used for low light recording..    EEG REPORT:    EEG Report:  · EEG Report	  Epilepsy Attending Note:     SIMONA KUHN    79y Female  MRN [id number]    Vital Signs Last 24 Hrs  T(C): 37.1 (2024 11:00), Max: 38 (31 Oct 2024 14:00)  T(F): 98.8 (2024 11:00), Max: 100.4 (31 Oct 2024 14:00)  HR: 99 (2024 11:00) (78 - 103)  BP: 145/63 (2024 11:00) (136/100 - 199/74)  BP(mean): 98 (2024 08:00) (98 - 100)  RR: 20 (2024 11:00) (18 - 20)  SpO2: 99% (2024 11:00) (99% - 100%)    Parameters below as of 2024 11:00  Patient On (Oxygen Delivery Method): nasal cannula  O2 Flow (L/min): 3                            7.1    7.79  )-----------( 233      ( 2024 05:39 )             22.0       11-01    147[H]  |  113[H]  |  28[H]  ----------------------------<  77  4.2   |  20  |  1.4    Ca    8.7      2024 05:39  Phos  5.3       Mg     1.9         TPro  4.8[L]  /  Alb  3.5  /  TBili  0.8  /  DBili  x   /  AST  23  /  ALT  31  /  AlkPhos  86        MEDICATIONS  (STANDING):  chlorhexidine 2% Cloths 1 Application(s) Topical <User Schedule>  dextrose 5%. 1000 milliLiter(s) (100 mL/Hr) IV Continuous <Continuous>  dextrose 5%. 1000 milliLiter(s) (50 mL/Hr) IV Continuous <Continuous>  dextrose 50% Injectable 25 Gram(s) IV Push once  dextrose 50% Injectable 12.5 Gram(s) IV Push once  dextrose 50% Injectable 25 Gram(s) IV Push once  glucagon  Injectable 1 milliGRAM(s) IntraMuscular once  insulin lispro (ADMELOG) corrective regimen sliding scale   SubCutaneous every 6 hours  lacosamide IVPB 50 milliGRAM(s) IV Intermittent every 12 hours  levETIRAcetam   Injectable 750 milliGRAM(s) IV Push every 12 hours  magnesium sulfate  IVPB 2 Gram(s) IV Intermittent once  niCARdipine Infusion 5 mG/Hr (25 mL/Hr) IV Continuous <Continuous>  pantoprazole    Tablet 40 milliGRAM(s) Oral before breakfast  piperacillin/tazobactam IVPB.- 3.375 Gram(s) IV Intermittent once  piperacillin/tazobactam IVPB.. 3.375 Gram(s) IV Intermittent every 8 hours  sodium chloride 0.9%. 1000 milliLiter(s) (50 mL/Hr) IV Continuous <Continuous>  vancomycin  IVPB 1000 milliGRAM(s) IV Intermittent every 24 hours    MEDICATIONS  (PRN):  dextrose Oral Gel 15 Gram(s) Oral once PRN Blood Glucose LESS THAN 70 milliGRAM(s)/deciliter  labetalol Injectable 10 milliGRAM(s) IV Push every 2 hours PRN Systolic blood pressure > 140            VEEG in the last 14 hours:    Background - continuous, less than optimally organized, reaching frequencies in the range of 4-5 Hz     Focal and generalized slowin. moderate to severe generalized slowing  2. bilateral posterior quadrants focal slowing, right significantly more than left    Interictal activity:  1. Continues GPDs of mixed morphology: triphasic waves, spike, and sharp waves.   2., isolated right hemispheric spikes and polyspikes    Events - none    Seizures - none    Impression: Abnormal VEEG   - Diffuse slowing consistent with diffuse cortical dysfunction   - Continuous GPDs consistent with cortical irritability, typically of toxic/metabolic etiology and epileptic potential          TECH INFORMATION:   This is a Continuous Video EEG.     Recording Technique: The patient underwent continuous video-EEG monitoring, using Telemetry System hardware on the [employer].  EEG and video data were stored on a computer hard drive with important events saved in digital archive files. The material was reviewed by a physician (electroencephalographer/epileptologist) on a daily basis.  Yadiel and seizure detection algorithms were utilized and reviewed.  An EEG Technician attended to the patient for 8 to 10 hours per day. The patient was observed by the epilepsy nursing staff 24 hours per day. The epilepsy center neurologist was available in person or on call 24 hours per day..     Placement and Labeling of Electrodes: The EEG was performed utilizing at least 20 channel referential EEG connections (coronal over temporal over parasagittal montage) with inferior temporal electrodes when indicting and using all standard 10-20 electrode placements with EKG, with additional electrodes placed in the inferior temporal region using the modified 10-10 montage electrode placements for elective admissions, or if deemed necessary.  Recording was at  a sampling rate of 256 samples per second per channel. Time synchronized digital video recording was done simultaneously with EEG recording.  A low light infrared camera was used for low light recording..    EEG REPORT:    EEG Report:  · EEG Report	  Epilepsy Attending Note:     SIMONA KUHN    79y Female  MRN [id number]    Vital Signs Last 24 Hrs  T(C): 37.1 (2024 11:00), Max: 38 (31 Oct 2024 14:00)  T(F): 98.8 (2024 11:00), Max: 100.4 (31 Oct 2024 14:00)  HR: 99 (2024 11:00) (78 - 103)  BP: 145/63 (2024 11:00) (136/100 - 199/74)  BP(mean): 98 (2024 08:00) (98 - 100)  RR: 20 (2024 11:00) (18 - 20)  SpO2: 99% (2024 11:00) (99% - 100%)    Parameters below as of 2024 11:00  Patient On (Oxygen Delivery Method): nasal cannula  O2 Flow (L/min): 3                            7.1    7.79  )-----------( 233      ( 2024 05:39 )             22.0       11-01    147[H]  |  113[H]  |  28[H]  ----------------------------<  77  4.2   |  20  |  1.4    Ca    8.7      2024 05:39  Phos  5.3       Mg     1.9         TPro  4.8[L]  /  Alb  3.5  /  TBili  0.8  /  DBili  x   /  AST  23  /  ALT  31  /  AlkPhos  86        MEDICATIONS  (STANDING):  chlorhexidine 2% Cloths 1 Application(s) Topical <User Schedule>  dextrose 5%. 1000 milliLiter(s) (100 mL/Hr) IV Continuous <Continuous>  dextrose 5%. 1000 milliLiter(s) (50 mL/Hr) IV Continuous <Continuous>  dextrose 50% Injectable 25 Gram(s) IV Push once  dextrose 50% Injectable 12.5 Gram(s) IV Push once  dextrose 50% Injectable 25 Gram(s) IV Push once  glucagon  Injectable 1 milliGRAM(s) IntraMuscular once  insulin lispro (ADMELOG) corrective regimen sliding scale   SubCutaneous every 6 hours  lacosamide IVPB 50 milliGRAM(s) IV Intermittent every 12 hours  levETIRAcetam   Injectable 750 milliGRAM(s) IV Push every 12 hours  magnesium sulfate  IVPB 2 Gram(s) IV Intermittent once  niCARdipine Infusion 5 mG/Hr (25 mL/Hr) IV Continuous <Continuous>  pantoprazole    Tablet 40 milliGRAM(s) Oral before breakfast  piperacillin/tazobactam IVPB.- 3.375 Gram(s) IV Intermittent once  piperacillin/tazobactam IVPB.. 3.375 Gram(s) IV Intermittent every 8 hours  sodium chloride 0.9%. 1000 milliLiter(s) (50 mL/Hr) IV Continuous <Continuous>  vancomycin  IVPB 1000 milliGRAM(s) IV Intermittent every 24 hours    MEDICATIONS  (PRN):  dextrose Oral Gel 15 Gram(s) Oral once PRN Blood Glucose LESS THAN 70 milliGRAM(s)/deciliter  labetalol Injectable 10 milliGRAM(s) IV Push every 2 hours PRN Systolic blood pressure > 140            VEEG in the last 14 hours:    Background - continuous, less than optimally organized, reaching frequencies in the range of 4-5 Hz     Focal and generalized slowin. moderate to severe generalized slowing  2. bilateral posterior quadrant focal slowing, right significantly more than left    Interictal activity:  1. Continuous GPDs of mixed morphology: triphasic waves, spike, and sharp waves, typically of 2 Hz.   2., isolated right hemispheric spikes and polyspikes    Events - none    Seizures - none    Impression: Abnormal VEEG   - Diffuse slowing consistent with diffuse cortical dysfunction   - Focal biposterior slowing, R>L, consistent with focal cerebral dysfunction.   - Continuous GPDs consistent with cortical irritability, typically of toxic/metabolic etiology and epileptic potential

## 2024-11-02 NOTE — PROGRESS NOTE ADULT - SUBJECTIVE AND OBJECTIVE BOX
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 While in NSICU, patient underwent MMA embolization and post operatively was stable, however, on 10/16 she was noted to have leaking from her crani site. STAT CTH showed increase in SDH and extracalvarial collection, NSG removed 25cc of blood and was re-sutured. Patient was then downgraded to the floor, was started on zosyn for UTI, and was discharged to short term rehab on 10/27. Today, patient was more lethargic and presented to the ED for evaluation. CTH showing stable SDH, CTA negative for acute stroke. NSx and NSICU consulted for management.     REVIEW OF SYSTEMS: [X ] Unable to Assess due to neurologic exam     Neuro: [ ] Headache [ ] Back pain [ ] Numbness [ ] Weakness [ ] Ataxia [ ] Dizziness [ ] Aphasia [ ] Dysarthria [ ] Visual disturbance  Resp: [ ] Shortness of breath/dyspnea, [ ] Orthopnea [ ] Cough  CV: [ ] Chest pain [ ] Palpitation [ ] Lightheadedness [ ] Syncope  Renal: [ ] Thirst [ ] Edema  GI: [ ] Nausea [ ] Emesis [ ] Abdominal pain [ ] Constipation [ ] Diarrhea  Hem: [ ] Hematemesis [ ] bright red blood per rectum  ID: [ ] Fever [ ] Chills [ ] Dysuria  ENT: [ ] Rhinorrhea    ICU Vital Signs Last 24 Hrs  T(C): 36.9 (02 Nov 2024 08:00), Max: 37.1 (01 Nov 2024 11:00)  T(F): 98.5 (02 Nov 2024 08:00), Max: 98.8 (01 Nov 2024 11:00)  HR: 92 (02 Nov 2024 09:30) (79 - 102)  BP: 133/61 (02 Nov 2024 09:30) (119/56 - 156/68)  BP(mean): 88 (02 Nov 2024 09:30) (78 - 98)  RR: 19 (02 Nov 2024 09:30) (13 - 20)  SpO2: 100% (02 Nov 2024 09:30) (97% - 100%)    O2 Parameters below as of 02 Nov 2024 08:00  Patient On (Oxygen Delivery Method): nasal cannula      01 Nov 2024 07:01  -  02 Nov 2024 07:00  --------------------------------------------------------  IN:    IV PiggyBack: 250 mL    NiCARdipine: 750 mL    sodium chloride 0.9%: 450 mL  Total IN: 1450 mL    OUT:    Voided (mL): 200 mL  Total OUT: 200 mL    Total NET: 1250 mL      02 Nov 2024 08:01  -  02 Nov 2024 10:46  --------------------------------------------------------  IN:    IV PiggyBack: 50 mL    NiCARdipine: 112.5 mL  Total IN: 162.5 mL    OUT:  Total OUT: 0 mL    Total NET: 162.5 mL    MEDICATIONS  (STANDING):  chlorhexidine 2% Cloths 1 Application(s) Topical <User Schedule>  dextrose 5%. 1000 milliLiter(s) (100 mL/Hr) IV Continuous <Continuous>  dextrose 5%. 1000 milliLiter(s) (50 mL/Hr) IV Continuous <Continuous>  dextrose 50% Injectable 25 Gram(s) IV Push once  dextrose 50% Injectable 12.5 Gram(s) IV Push once  dextrose 50% Injectable 25 Gram(s) IV Push once  glucagon  Injectable 1 milliGRAM(s) IntraMuscular once  insulin lispro (ADMELOG) corrective regimen sliding scale   SubCutaneous every 6 hours  lacosamide IVPB 50 milliGRAM(s) IV Intermittent every 12 hours  levETIRAcetam   Injectable 750 milliGRAM(s) IV Push every 12 hours  niCARdipine Infusion 5 mG/Hr (25 mL/Hr) IV Continuous <Continuous>  pantoprazole  Injectable 40 milliGRAM(s) IV Push daily  piperacillin/tazobactam IVPB.. 3.375 Gram(s) IV Intermittent every 8 hours  sodium chloride 0.9%. 1000 milliLiter(s) (50 mL/Hr) IV Continuous <Continuous>  vancomycin  IVPB 1000 milliGRAM(s) IV Intermittent every 24 hours      MEDICATIONS  (PRN):  dextrose Oral Gel 15 Gram(s) Oral once PRN Blood Glucose LESS THAN 70 milliGRAM(s)/deciliter  labetalol Injectable 10 milliGRAM(s) IV Push every 2 hours PRN Systolic blood pressure > 140      11-02    146  |  114[H]  |  33[H]  ----------------------------<  73- Corrected 15.5 AG   4.3   |  18  |  1.4    Ca    9.0      02 Nov 2024 04:38  Phos  5.4     11-02  Mg     1.9     11-02    TPro  4.9[L]  /  Alb  3.5  /  TBili  1.2  /  DBili  x   /  AST  20  /  ALT  25  /  AlkPhos  84  11-02                          8.3    8.81  )-----------( 213      ( 02 Nov 2024 04:38 )             24.9         Urinalysis with Rflx Culture (collected 01 Nov 2024 02:50)    Culture - Blood (collected 31 Oct 2024 12:10)  Source: .Blood BLOOD  Preliminary Report (01 Nov 2024 22:02):    No growth at 24 hours    Culture - Blood (collected 31 Oct 2024 12:10)  Source: .Blood BLOOD  Preliminary Report (01 Nov 2024 22:02):    No growth at 24 hours    UA - 59 WBC / Leuco esterase     CXR - B/L effusions      CT Head No Cont (11.01.24 @ 00:20) >    IMPRESSION:    Essentially stable exam compared to CT head from 10/31/2024.    < end of copied text >     Xray Chest 1 View-PORTABLE IMMEDIATE (10.31.24 @ 14:30) >  IMPRESSION:    Unchanged bibasilar opacities and effusions.     CT Angio Neck w/ IV Cont (10.31.24 @ 12:59) >  IMPRESSION:  CT HEAD:  No significant change in appearance of a residual mixed density right   hemispheric subdural hematoma status post craniotomy for evacuation with   minimal leftward midline shift.    Similar focal vasogenic edema within the right parietal lobe which may   reflect subacute infarct or traumatic contusion.    CTA HEAD:  No evidence of flow-limiting stenosis, occlusion or aneurysm.    CTA NECK:  No evidence of carotid or vertebral artery stenosis.    Large bilateral pleural effusions.      Physical Exam: PHYSICAL EXAM:  GENERAL: well built, well nourished  EYES: EOMI, PERRLA, conjunctiva and sclera clear  NECK: Supple, No JVD,   CHEST/LUNG:   HEART: S1S2 normal, no S3, Regular rate and rhythm; No murmurs, rubs, or gallops  ABDOMEN: Soft, Nontender, Nondistended; Bowel sounds present  EXTREMITIES:  2+ Peripheral Pulses, No clubbing, cyanosis, or edema  LYMPH: No lymphadenopathy noted  SKIN: No rashes or lesions  NEURO: GCS 4, briefly opens eyes to noxious stimuli, right gaze preference but able to cross midline, +beating nystagmus, PERRLA, weak gag/cough, B/L UE withdrawal to noxious with R> L, B/L LE WD to      79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 While in NSICU, patient underwent MMA embolization and post operatively was stable, however, on 10/16 she was noted to have leaking from her crani site. STAT CTH showed increase in SDH and extracalvarial collection, NSG removed 25cc of blood and was re-sutured. Patient was then downgraded to the floor, was started on zosyn for UTI, and was discharged to short term rehab on 10/27. Today, patient was more lethargic and presented to the ED for evaluation. CTH showing stable SDH, CTA negative for acute stroke. NSx and NSICU consulted for management.     REVIEW OF SYSTEMS: [X ] Unable to Assess due to neurologic exam     Neuro: [ ] Headache [ ] Back pain [ ] Numbness [ ] Weakness [ ] Ataxia [ ] Dizziness [ ] Aphasia [ ] Dysarthria [ ] Visual disturbance  Resp: [ ] Shortness of breath/dyspnea, [ ] Orthopnea [ ] Cough  CV: [ ] Chest pain [ ] Palpitation [ ] Lightheadedness [ ] Syncope  Renal: [ ] Thirst [ ] Edema  GI: [ ] Nausea [ ] Emesis [ ] Abdominal pain [ ] Constipation [ ] Diarrhea  Hem: [ ] Hematemesis [ ] bright red blood per rectum  ID: [ ] Fever [ ] Chills [ ] Dysuria  ENT: [ ] Rhinorrhea    ICU Vital Signs Last 24 Hrs  T(C): 36.9 (02 Nov 2024 08:00), Max: 37.1 (01 Nov 2024 11:00)  T(F): 98.5 (02 Nov 2024 08:00), Max: 98.8 (01 Nov 2024 11:00)  HR: 92 (02 Nov 2024 09:30) (79 - 102)  BP: 133/61 (02 Nov 2024 09:30) (119/56 - 156/68)  BP(mean): 88 (02 Nov 2024 09:30) (78 - 98)  RR: 19 (02 Nov 2024 09:30) (13 - 20)  SpO2: 100% (02 Nov 2024 09:30) (97% - 100%)    O2 Parameters below as of 02 Nov 2024 08:00  Patient On (Oxygen Delivery Method): nasal cannula      01 Nov 2024 07:01  -  02 Nov 2024 07:00  --------------------------------------------------------  IN:    IV PiggyBack: 250 mL    NiCARdipine: 750 mL    sodium chloride 0.9%: 450 mL  Total IN: 1450 mL    OUT:    Voided (mL): 200 mL  Total OUT: 200 mL    Total NET: 1250 mL      02 Nov 2024 08:01  -  02 Nov 2024 10:46  --------------------------------------------------------  IN:    IV PiggyBack: 50 mL    NiCARdipine: 112.5 mL  Total IN: 162.5 mL    OUT:  Total OUT: 0 mL    Total NET: 162.5 mL    MEDICATIONS  (STANDING):  chlorhexidine 2% Cloths 1 Application(s) Topical <User Schedule>  dextrose 5%. 1000 milliLiter(s) (100 mL/Hr) IV Continuous <Continuous>  dextrose 5%. 1000 milliLiter(s) (50 mL/Hr) IV Continuous <Continuous>  dextrose 50% Injectable 25 Gram(s) IV Push once  dextrose 50% Injectable 12.5 Gram(s) IV Push once  dextrose 50% Injectable 25 Gram(s) IV Push once  glucagon  Injectable 1 milliGRAM(s) IntraMuscular once  insulin lispro (ADMELOG) corrective regimen sliding scale   SubCutaneous every 6 hours  lacosamide IVPB 50 milliGRAM(s) IV Intermittent every 12 hours  levETIRAcetam   Injectable 750 milliGRAM(s) IV Push every 12 hours  niCARdipine Infusion 5 mG/Hr (25 mL/Hr) IV Continuous <Continuous>  pantoprazole  Injectable 40 milliGRAM(s) IV Push daily  piperacillin/tazobactam IVPB.. 3.375 Gram(s) IV Intermittent every 8 hours  sodium chloride 0.9%. 1000 milliLiter(s) (50 mL/Hr) IV Continuous <Continuous>  vancomycin  IVPB 1000 milliGRAM(s) IV Intermittent every 24 hours      MEDICATIONS  (PRN):  dextrose Oral Gel 15 Gram(s) Oral once PRN Blood Glucose LESS THAN 70 milliGRAM(s)/deciliter  labetalol Injectable 10 milliGRAM(s) IV Push every 2 hours PRN Systolic blood pressure > 140      11-02    146  |  114[H]  |  33[H]  ----------------------------<  73- Corrected 15.5 AG   4.3   |  18  |  1.4    Ca    9.0      02 Nov 2024 04:38  Phos  5.4     11-02  Mg     1.9     11-02    TPro  4.9[L]  /  Alb  3.5  /  TBili  1.2  /  DBili  x   /  AST  20  /  ALT  25  /  AlkPhos  84  11-02                          8.3    8.81  )-----------( 213      ( 02 Nov 2024 04:38 )             24.9         Urinalysis with Rflx Culture (collected 01 Nov 2024 02:50)    Culture - Blood (collected 31 Oct 2024 12:10)  Source: .Blood BLOOD  Preliminary Report (01 Nov 2024 22:02):    No growth at 24 hours    Culture - Blood (collected 31 Oct 2024 12:10)  Source: .Blood BLOOD  Preliminary Report (01 Nov 2024 22:02):    No growth at 24 hours    UA - 59 WBC / Leuco esterase     CXR - B/L effusions      CT Head No Cont (11.01.24 @ 00:20) >    IMPRESSION:    Essentially stable exam compared to CT head from 10/31/2024.    < end of copied text >     Xray Chest 1 View-PORTABLE IMMEDIATE (10.31.24 @ 14:30) >  IMPRESSION:    Unchanged bibasilar opacities and effusions.     CT Angio Neck w/ IV Cont (10.31.24 @ 12:59) >  IMPRESSION:  CT HEAD:  No significant change in appearance of a residual mixed density right   hemispheric subdural hematoma status post craniotomy for evacuation with   minimal leftward midline shift.    Similar focal vasogenic edema within the right parietal lobe which may   reflect subacute infarct or traumatic contusion.    CTA HEAD:  No evidence of flow-limiting stenosis, occlusion or aneurysm.    CTA NECK:  No evidence of carotid or vertebral artery stenosis.    Large bilateral pleural effusions.      Physical Exam: PHYSICAL EXAM:  GENERAL: well built, well nourished  EYES:  PERRLA, conjunctiva and sclera clear  NECK: Supple, No JVD,   Lungs- Coarse BS bilaterally   HEART: S1S2 normal, no S3, Regular rate and rhythm; No murmurs, rubs, or gallops  ABDOMEN: Soft, Nontender, Nondistended; Bowel sounds present  EXTREMITIES:  2+ Peripheral Pulses, edematous B/UE and LE   SKIN:  admitted  stage 2  buttocks - on admission   NEURO: GCS 4, opens eyes to noxious stimuli, left gaze preference but able to cross midline, +beating nystagmus, PERRLA, weak gag/cough, R UPPER  antigravity to noxious, RLE - withdraws , LLE - TF and LUE - Min withdrawal       79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 While in NSICU, patient underwent MMA embolization and post operatively was stable, however, on 10/16 she was noted to have leaking from her crani site. STAT CTH showed increase in SDH and extracalvarial collection, NSG removed 25cc of blood and was re-sutured. Patient was then downgraded to the floor, was started on zosyn for UTI, and was discharged to short term rehab on 10/27. Today, patient was more lethargic and presented to the ED for evaluation. CTH showing stable SDH, CTA negative for acute stroke. NSx and NSICU consulted for management.     REVIEW OF SYSTEMS: [X ] Unable to Assess due to neurologic exam     Neuro: [ ] Headache [ ] Back pain [ ] Numbness [ ] Weakness [ ] Ataxia [ ] Dizziness [ ] Aphasia [ ] Dysarthria [ ] Visual disturbance  Resp: [ ] Shortness of breath/dyspnea, [ ] Orthopnea [ ] Cough  CV: [ ] Chest pain [ ] Palpitation [ ] Lightheadedness [ ] Syncope  Renal: [ ] Thirst [ ] Edema  GI: [ ] Nausea [ ] Emesis [ ] Abdominal pain [ ] Constipation [ ] Diarrhea  Hem: [ ] Hematemesis [ ] bright red blood per rectum  ID: [ ] Fever [ ] Chills [ ] Dysuria  ENT: [ ] Rhinorrhea    ICU Vital Signs Last 24 Hrs  T(C): 36.9 (02 Nov 2024 08:00), Max: 37.1 (01 Nov 2024 11:00)  T(F): 98.5 (02 Nov 2024 08:00), Max: 98.8 (01 Nov 2024 11:00)  HR: 92 (02 Nov 2024 09:30) (79 - 102)  BP: 133/61 (02 Nov 2024 09:30) (119/56 - 156/68)  BP(mean): 88 (02 Nov 2024 09:30) (78 - 98)  RR: 19 (02 Nov 2024 09:30) (13 - 20)  SpO2: 100% (02 Nov 2024 09:30) (97% - 100%)    O2 Parameters below as of 02 Nov 2024 08:00  Patient On (Oxygen Delivery Method): nasal cannula      01 Nov 2024 07:01  -  02 Nov 2024 07:00  --------------------------------------------------------  IN:    IV PiggyBack: 250 mL    NiCARdipine: 750 mL    sodium chloride 0.9%: 450 mL  Total IN: 1450 mL    OUT:    Voided (mL): 200 mL  Total OUT: 200 mL    Total NET: 1250 mL      02 Nov 2024 08:01  -  02 Nov 2024 10:46  --------------------------------------------------------  IN:    IV PiggyBack: 50 mL    NiCARdipine: 112.5 mL  Total IN: 162.5 mL    OUT:  Total OUT: 0 mL    Total NET: 162.5 mL    MEDICATIONS  (STANDING):  chlorhexidine 2% Cloths 1 Application(s) Topical <User Schedule>  dextrose 5%. 1000 milliLiter(s) (100 mL/Hr) IV Continuous <Continuous>  dextrose 5%. 1000 milliLiter(s) (50 mL/Hr) IV Continuous <Continuous>  dextrose 50% Injectable 25 Gram(s) IV Push once  dextrose 50% Injectable 12.5 Gram(s) IV Push once  dextrose 50% Injectable 25 Gram(s) IV Push once  glucagon  Injectable 1 milliGRAM(s) IntraMuscular once  insulin lispro (ADMELOG) corrective regimen sliding scale   SubCutaneous every 6 hours  lacosamide IVPB 50 milliGRAM(s) IV Intermittent every 12 hours  levETIRAcetam   Injectable 750 milliGRAM(s) IV Push every 12 hours  niCARdipine Infusion 5 mG/Hr (25 mL/Hr) IV Continuous <Continuous>  pantoprazole  Injectable 40 milliGRAM(s) IV Push daily  piperacillin/tazobactam IVPB.. 3.375 Gram(s) IV Intermittent every 8 hours  sodium chloride 0.9%. 1000 milliLiter(s) (50 mL/Hr) IV Continuous <Continuous>  vancomycin  IVPB 1000 milliGRAM(s) IV Intermittent every 24 hours      MEDICATIONS  (PRN):  dextrose Oral Gel 15 Gram(s) Oral once PRN Blood Glucose LESS THAN 70 milliGRAM(s)/deciliter  labetalol Injectable 10 milliGRAM(s) IV Push every 2 hours PRN Systolic blood pressure > 140      11-02    146  |  114[H]  |  33[H]  ----------------------------<  73- Corrected 15.5 AG   4.3   |  18  |  1.4    Ca    9.0      02 Nov 2024 04:38  Phos  5.4     11-02  Mg     1.9     11-02    TPro  4.9[L]  /  Alb  3.5  /  TBili  1.2  /  DBili  x   /  AST  20  /  ALT  25  /  AlkPhos  84  11-02                          8.3    8.81  )-----------( 213      ( 02 Nov 2024 04:38 )             24.9         Urinalysis with Rflx Culture (collected 01 Nov 2024 02:50)    Culture - Blood (collected 31 Oct 2024 12:10)  Source: .Blood BLOOD  Preliminary Report (01 Nov 2024 22:02):    No growth at 24 hours    Culture - Blood (collected 31 Oct 2024 12:10)  Source: .Blood BLOOD  Preliminary Report (01 Nov 2024 22:02):    No growth at 24 hours    UA - 59 WBC / Leuco esterase     CXR - B/L effusions      CT Head No Cont (11.01.24 @ 00:20) >    IMPRESSION:    Essentially stable exam compared to CT head from 10/31/2024.    < end of copied text >     Xray Chest 1 View-PORTABLE IMMEDIATE (10.31.24 @ 14:30) >  IMPRESSION:    Unchanged bibasilar opacities and effusions.     CT Angio Neck w/ IV Cont (10.31.24 @ 12:59) >  IMPRESSION:  CT HEAD:  No significant change in appearance of a residual mixed density right   hemispheric subdural hematoma status post craniotomy for evacuation with   minimal leftward midline shift.    Similar focal vasogenic edema within the right parietal lobe which may   reflect subacute infarct or traumatic contusion.    CTA HEAD:  No evidence of flow-limiting stenosis, occlusion or aneurysm.    CTA NECK:  No evidence of carotid or vertebral artery stenosis.    Large bilateral pleural effusions.      Physical Exam: PHYSICAL EXAM:  GENERAL: well built, well nourished  EYES:  PERRLA, conjunctiva and sclera clear  NECK: No JVD,   Lungs- Coarse BS bilaterally   HEART: S1S2 normal, no S3, Regular rate and rhythm; No murmurs, rubs, or gallops  ABDOMEN: Soft, Nontender, Nondistended; Bowel sounds present  EXTREMITIES:  2+ Peripheral Pulses, edematous B/UE and LE   SKIN:  admitted  stage 2  buttocks - on admission   NEURO: GCS 4, opens eyes to noxious stimuli, left gaze preference but able to cross midline, PERRLA, weak gag/cough, R UPPER  antigravity to noxious, RLE - withdraws , LLE - TF and LUE - Min withdrawal       79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 While in NSICU, patient underwent MMA embolization and post operatively was stable, however, on 10/16 she was noted to have leaking from her crani site. STAT CTH showed increase in SDH and extracalvarial collection, NSG removed 25cc of blood and was re-sutured. Patient was then downgraded to the floor, was started on zosyn for UTI, and was discharged to short term rehab on 10/27. Today, patient was more lethargic and presented to the ED for evaluation. CTH showing stable SDH, CTA negative for acute stroke. NSx and NSICU consulted for management.     REVIEW OF SYSTEMS: [X ] Unable to Assess due to neurologic exam     Neuro: [ ] Headache [ ] Back pain [ ] Numbness [ ] Weakness [ ] Ataxia [ ] Dizziness [ ] Aphasia [ ] Dysarthria [ ] Visual disturbance  Resp: [ ] Shortness of breath/dyspnea, [ ] Orthopnea [ ] Cough  CV: [ ] Chest pain [ ] Palpitation [ ] Lightheadedness [ ] Syncope  Renal: [ ] Thirst [ ] Edema  GI: [ ] Nausea [ ] Emesis [ ] Abdominal pain [ ] Constipation [ ] Diarrhea  Hem: [ ] Hematemesis [ ] bright red blood per rectum  ID: [ ] Fever [ ] Chills [ ] Dysuria  ENT: [ ] Rhinorrhea    ICU Vital Signs Last 24 Hrs  T(C): 36.9 (2024 08:00), Max: 37.1 (2024 11:00)  T(F): 98.5 (2024 08:00), Max: 98.8 (2024 11:00)  HR: 92 (2024 09:30) (79 - 102)  BP: 133/61 (2024 09:30) (119/56 - 156/68)  BP(mean): 88 (2024 09:30) (78 - 98)  RR: 19 (2024 09:30) (13 - 20)  SpO2: 100% (2024 09:30) (97% - 100%)    O2 Parameters below as of 2024 08:00  Patient On (Oxygen Delivery Method): nasal cannula      2024 07:01  -  2024 07:00  --------------------------------------------------------  IN:    IV PiggyBack: 250 mL    NiCARdipine: 750 mL    sodium chloride 0.9%: 450 mL  Total IN: 1450 mL    OUT:    Voided (mL): 200 mL  Total OUT: 200 mL    Total NET: 1250 mL      2024 08:01  -  2024 10:46  --------------------------------------------------------  IN:    IV PiggyBack: 50 mL    NiCARdipine: 112.5 mL  Total IN: 162.5 mL    OUT:  Total OUT: 0 mL    Total NET: 162.5 mL    MEDICATIONS  (STANDING):  chlorhexidine 2% Cloths 1 Application(s) Topical <User Schedule>  dextrose 5%. 1000 milliLiter(s) (100 mL/Hr) IV Continuous <Continuous>  dextrose 5%. 1000 milliLiter(s) (50 mL/Hr) IV Continuous <Continuous>  dextrose 50% Injectable 25 Gram(s) IV Push once  dextrose 50% Injectable 12.5 Gram(s) IV Push once  dextrose 50% Injectable 25 Gram(s) IV Push once  glucagon  Injectable 1 milliGRAM(s) IntraMuscular once  insulin lispro (ADMELOG) corrective regimen sliding scale   SubCutaneous every 6 hours  lacosamide IVPB 50 milliGRAM(s) IV Intermittent every 12 hours  levETIRAcetam   Injectable 750 milliGRAM(s) IV Push every 12 hours  niCARdipine Infusion 5 mG/Hr (25 mL/Hr) IV Continuous <Continuous>  pantoprazole  Injectable 40 milliGRAM(s) IV Push daily  piperacillin/tazobactam IVPB.. 3.375 Gram(s) IV Intermittent every 8 hours  sodium chloride 0.9%. 1000 milliLiter(s) (50 mL/Hr) IV Continuous <Continuous>  vancomycin  IVPB 1000 milliGRAM(s) IV Intermittent every 24 hours      MEDICATIONS  (PRN):  dextrose Oral Gel 15 Gram(s) Oral once PRN Blood Glucose LESS THAN 70 milliGRAM(s)/deciliter  labetalol Injectable 10 milliGRAM(s) IV Push every 2 hours PRN Systolic blood pressure > 140          146  |  114[H]  |  33[H]  ----------------------------<  73- Corrected 15.5 AG   4.3   |  18  |  1.4    Ca    9.0      2024 04:38  Phos  5.4       Mg     1.9         TPro  4.9[L]  /  Alb  3.5  /  TBili  1.2  /  DBili  x   /  AST  20  /  ALT  25  /  AlkPhos  84                            8.3    8.81  )-----------( 213      ( 2024 04:38 )             24.9         Urinalysis with Rflx Culture (collected 2024 02:50)    Culture - Blood (collected 31 Oct 2024 12:10)  Source: .Blood BLOOD  Preliminary Report (2024 22:02):    No growth at 24 hours    Culture - Blood (collected 31 Oct 2024 12:10)  Source: .Blood BLOOD  Preliminary Report (2024 22:02):    No growth at 24 hours    UA - 59 WBC / Leuco esterase     CXR - B/L effusions      CT Head No Cont (24 @ 00:20) >    IMPRESSION:    Essentially stable exam compared to CT head from 10/31/2024.    < end of copied text >     Xray Chest 1 View-PORTABLE IMMEDIATE (10.31.24 @ 14:30) >  IMPRESSION:    Unchanged bibasilar opacities and effusions.     CT Angio Neck w/ IV Cont (10.31.24 @ 12:59) >  IMPRESSION:  CT HEAD:  No significant change in appearance of a residual mixed density right   hemispheric subdural hematoma status post craniotomy for evacuation with   minimal leftward midline shift.    Similar focal vasogenic edema within the right parietal lobe which may   reflect subacute infarct or traumatic contusion.    CTA HEAD:  No evidence of flow-limiting stenosis, occlusion or aneurysm.    CTA NECK:  No evidence of carotid or vertebral artery stenosis.    Large bilateral pleural effusions.    VEEG in the last 14 hours:-     Background - continuous, less than optimally organized, reaching frequencies in the range of 4-5 Hz     Focal and generalized slowin. moderate to severe generalized slowing  2. bilateral posterior quadrants focal slowing, right significantly more than left    Interictal activity:  1. Continues GPDs of mixed morphology: triphasic waves, spike, and sharp waves.   2., isolated right hemispheric spikes and polyspikes    Events - none    Seizures - none    Impression: Abnormal VEEG   - Diffuse slowing consistent with diffuse cortical dysfunction   - Continuous GPDs consistent with cortical irritability, typically of toxic/metabolic etiology and epileptic potential       Physical Exam: PHYSICAL EXAM:  GENERAL: well built, well nourished  EYES:  PERRLA, conjunctiva and sclera clear  NECK: No JVD,   Lungs- Coarse BS bilaterally   HEART: S1S2 normal, no S3, Regular rate and rhythm; No murmurs, rubs, or gallops  ABDOMEN: Soft, Nontender, Nondistended; Bowel sounds present  EXTREMITIES:  2+ Peripheral Pulses, edematous B/UE and LE   SKIN:  admitted  stage 2  buttocks - on admission   NEURO: GCS 4, opens eyes to noxious stimuli, left gaze preference but able to cross midline, PERRLA, weak gag/cough, R UPPER  antigravity to noxious, RLE - withdraws , LLE - TF and LUE - Min withdrawal

## 2024-11-02 NOTE — PROGRESS NOTE ADULT - ASSESSMENT
79F w/ PMHx of MDS, DM, HTN, CKD, recent admission for SDH s/p crani, evac and MMA embolization, presents with decreased MS, found to have stable SDH, admitted to NSICU for r/o status epilepticus     #SDH  #R/o Status epilepticus  #HTN  #MDS  #CKD  #Fever    Plan:    Neurological:  - Neuro Checks Q2 NC  - Repeat CTH this evening- monitor increased heme in R SDH - Stable  11/1/24   - MR Brain when stable  - vEEG  -  Keppra 750 gramq 12  - vimpat 50mg q 12   - Analgesia/Sedation: Tylenol PRN  - PT/OT    Cardiovascular:HTN   - SBP goal: 110 -  <150  - Normovolemia  -  lisinopril 20mg q day , labetalol,50mg q 8   Hold amlodipine  - Echo  TTE Echo Complete w/o Contrast w/ Doppler (10.09.24 @ 12:41) >Normal global left ventricular systolic function. Left ventricular ejection fraction, by visual estimation, is 65 to 70%  - EKG    Pulmonary:  L NC - humidified   - Wean to FIO2 > 92 %   - HOB 45  - Aspiration precautions  - Ongoing GOC discussion with family regarding airway status - low threshold to intubate, patients physical exam and mental status improving after keppra load, will monitor for now.  -  Mouth care per protocol    GI:  - Darren feeding tube - Glycerna -  55 cc/hr - 18 hr feed  - Dysphagia screen- fail   - Protonix (home medication)  - Bowel Regimen: Senna/Mirilax (when tolerating PO)  - LBM: PTA    /Renal:  - Strict Is/Os  - Small for 24 hrs  - lasix 20mg x1   - Sodium Goal: 135 - 145  - Replete as needed.  - Mg > 2    Endo: Pre- diabetic   - POCT, ISS,  -180  - A1c: 6  - Lipid Profile:   - TSH: 2.63    ID: leukocytosis (seizure vs infectious)/ UTI   - Trend Fever curve and WBC  - Sepsis w/u- Blood cultures, urine , CXR , procalcitonin  - zosyn 3.375grams  - day #3/7 ; D/C Vanco - MRSA nasal neg   - F/U cultures   - Normothermia    Hematology: MDS/ Leucocytosis  S/P RBC transfusion - 11/1/24 - HGB stable   No CBC and Chem- 7 in am   Chronic blood transfusions  - SCDs    MISC:   PT/OT [x]  SLP [x]  CAM-ICU[x]  Family Updated[x]    Lines/Tubes:  PIV: x 2    Code Status:F    Dispo: NSICU     : Dr Ortiz         79F w/ PMHx of MDS, DM, HTN, CKD, recent admission for SDH s/p crani, evac and MMA embolization, presents with decreased MS, found to have stable SDH, admitted to NSICU for r/o status epilepticus     #SDH  #R/o Status epilepticus  #HTN  #MDS  #CKD  #Fever    Plan:    Neurological: SDH   - Trial of ketamine 0.5 mg/kg/hr slowly increase dose   - Neuro Checks Q2 NC  - Repeat CTH this evening- monitor increased heme in R SDH - Stable  11/1/24   - MR Brain when stable  - vEEG  -  Keppra 750 gramq 12  - vimpat 50mg q 12   - Analgesia/Sedation: Tylenol PRN  - PT/OT    Cardiovascular:HTN   - SBP goal: 110 -  <150  - Normovolemia  -  lisinopril 20mg q day , labetalol,50mg q 8   Hold amlodipine  - Echo  TTE Echo Complete w/o Contrast w/ Doppler (10.09.24 @ 12:41) >Normal global left ventricular systolic function. Left ventricular ejection fraction, by visual estimation, is 65 to 70%  - EKG    Pulmonary:  L NC - humidified   - Wean to FIO2 > 92 %   - HOB 45  - Aspiration precautions  - Ongoing GOC discussion with family regarding airway status - low threshold to intubate, patients physical exam and mental status improving after keppra load, will monitor for now.  -  Mouth care per protocol    GI:  - Darren feeding tube - Glycerna -  55 cc/hr - 18 hr feed  - Dysphagia screen- fail   - Protonix (home medication)  - Bowel Regimen: Senna/Mirilax (when tolerating PO)  - LBM: PTA    /Renal:  - Strict Is/Os  - Small for 24 hrs  - lasix 20mg x1   - Sodium Goal: 135 - 145  - Replete as needed.  - Mg > 2    Endo: Pre- diabetic   - POCT, ISS,  -180  - A1c: 6  - Lipid Profile:   - TSH: 2.63    ID: leukocytosis (seizure vs infectious)/ UTI   - Trend Fever curve and WBC  - Sepsis w/u- Blood cultures, urine , CXR , procalcitonin  - zosyn 3.375grams  - day #3/7 ; D/C Vanco - MRSA nasal neg   - F/U cultures   - Normothermia    Hematology: MDS/ Leucocytosis  S/P RBC transfusion - 11/1/24 - HGB stable   No CBC and Chem- 7 in am   Chronic blood transfusions  - SCDs    MISC:   PT/OT [x]  SLP [x]  CAM-ICU[x]  Family Updated[x]    Lines/Tubes:  PIV: x 2    Code Status:F    Dispo: NSICU     : Dr Ortiz         79F w/ PMHx of MDS, DM, HTN, CKD, recent admission for SDH s/p crani, evac and MMA embolization, presents with decreased MS, found to have stable SDH, admitted to NSICU for r/o status epilepticus     #SDH  #R/o Status epilepticus  #HTN  #MDS  #CKD  #Fever    Plan:    Neurological: SDH   - Trial of ketamine 0.5 mg/kg/hr slowly increase dose   - Neuro Checks Q2 NC  - Repeat CTH this evening- monitor increased heme in R SDH - Stable  11/1/24   - MR Brain when stable  - vEEG  -  Keppra 750 gramq 12  - vimpat 50mg q 12   - Analgesia/Sedation: Tylenol PRN  - PT/OT    Cardiovascular:HTN   - SBP goal: 110 -  <150  - Normovolemia  -  lisinopril 20mg q day , labetalol,50mg q 8   Hold amlodipine  - Echo  TTE Echo Complete w/o Contrast w/ Doppler (10.09.24 @ 12:41) >Normal global left ventricular systolic function. Left ventricular ejection fraction, by visual estimation, is 65 to 70%  - EKG    Pulmonary:  L NC - humidified   - Wean to FIO2 > 92 %   - HOB 45  - Aspiration precautions  - Ongoing GOC discussion with family regarding airway status - low threshold to intubate, patients physical exam and mental status improving after keppra load, will monitor for now.  -  Mouth care per protocol    GI:  - Darren feeding tube - Glycerna -  55 cc/hr - 18 hr feed  - Dysphagia screen- fail   - Protonix (home medication)  - Bowel Regimen: Senna/Mirilax (when tolerating PO)  - LBM: PTA    /Renal:  - Strict Is/Os  - Small for 24 hrs  - lasix 20mg x1   - Sodium Goal: 135 - 145  - Replete as needed.  - Mg > 2    Endo: Pre- diabetic   - POCT, ISS,  -180  - A1c: 6  - Lipid Profile:   - TSH: 2.63    ID: leukocytosis (seizure vs infectious)/ UTI   - Trend Fever curve and WBC  - Sepsis w/u- Blood cultures, urine , CXR , procalcitonin  - zosyn 3.375grams  - day #3/7 ; D/C Vanco - MRSA nasal neg   - F/U cultures   - Normothermia    Hematology: MDS/ Leucocytosis  S/P RBC transfusion - 11/1/24 - HGB stable   No CBC and Chem- 7 in am   Chronic blood transfusions  - SCDs    MISC: Zinc 220mg q day + Vit C 250mg q 12;. Wound consult , Rotate q 2 hrs per protocol   PT/OT [x]  CAM-ICU[x]  Family Updated[x]    Lines/Tubes:  PIV: x 2    Code Status:F    Dispo: NSICU     : Dr Ortiz         79F w/ PMHx of MDS, DM, HTN, CKD, recent admission for SDH s/p crani, evac and MMA embolization, presents with decreased MS, found to have stable SDH, admitted to NSICU for r/o status epilepticus     #SDH  #R/o Status epilepticus  #HTN  #MDS  #CKD  #Fever    Plan:    Neurological: SDH   - Trial of ketamine 0.5 mg/kg/hr slowly increase dose - now 0.75mg/kg/hr.   - Neuro Checks Q2 NC  - Repeat CTH this evening- monitor increased heme in R SDH - Stable  11/1/24   - MR Brain when stable  - vEEG  -  Keppra 750 gramq 12  - vimpat 50mg q 12   - Analgesia/Sedation: Tylenol PRN  - PT/OT    Cardiovascular:HTN   - SBP goal: 110 -  <150  - Normovolemia  -  lisinopril 20mg q day , labetalol,50mg q 8   Hold amlodipine  - Echo  TTE Echo Complete w/o Contrast w/ Doppler (10.09.24 @ 12:41) >Normal global left ventricular systolic function. Left ventricular ejection fraction, by visual estimation, is 65 to 70%  - EKG    Pulmonary:  L NC - humidified   - Wean to FIO2 > 92 %   - HOB 45  - Aspiration precautions  - Ongoing GOC discussion with family regarding airway status - low threshold to intubate, patients physical exam and mental status improving after keppra load, will monitor for now.  -  Mouth care per protocol    GI:  - Darren feeding tube - Glycerna -  55 cc/hr - 18 hr feed  - Dysphagia screen- fail   - Protonix (home medication)  - Bowel Regimen: Senna/Mirilax (when tolerating PO)  - LBM: PTA    /Renal:  - Strict Is/Os  - Small for 24 hrs  - lasix 20mg x1   - Sodium Goal: 135 - 145  - Replete as needed.  - Mg > 2    Endo: Pre- diabetic   - POCT, ISS,  -180  - A1c: 6  - Lipid Profile:   - TSH: 2.63    ID: leukocytosis (seizure vs infectious) / UTI   - Trend Fever curve and WBC  - Sepsis w/u- Blood cultures, urine + culture pending , CXR-  , procalcitonin  - zosyn 3.375grams  - day #3/7 ; D/C Vanco - MRSA nasal neg   - F/U cultures   - Normothermia    Hematology: MDS/ Leucocytosis  S/P RBC transfusion - 11/1/24 - HGB stable   No CBC and Chem- 7 in am   Chronic blood transfusions  - SCDs    MISC: Zinc 220mg q day + Vit C 250mg q 12;. Wound consult , Rotate q 2 hrs per protocol   PT/OT [x]  CAM-ICU[x]  Family Updated[x]    Lines/Tubes:  PIV: x 2    Code Status:F    Dispo: NSICU     : Dr Ortiz

## 2024-11-03 LAB
ALBUMIN SERPL ELPH-MCNC: 3.3 G/DL — LOW (ref 3.5–5.2)
ALP SERPL-CCNC: 77 U/L — SIGNIFICANT CHANGE UP (ref 30–115)
ALT FLD-CCNC: 21 U/L — SIGNIFICANT CHANGE UP (ref 0–41)
ANION GAP SERPL CALC-SCNC: 13 MMOL/L — SIGNIFICANT CHANGE UP (ref 7–14)
AST SERPL-CCNC: 18 U/L — SIGNIFICANT CHANGE UP (ref 0–41)
BASOPHILS # BLD AUTO: 0.09 K/UL — SIGNIFICANT CHANGE UP (ref 0–0.2)
BASOPHILS NFR BLD AUTO: 0.8 % — SIGNIFICANT CHANGE UP (ref 0–1)
BILIRUB SERPL-MCNC: 0.7 MG/DL — SIGNIFICANT CHANGE UP (ref 0.2–1.2)
BUN SERPL-MCNC: 35 MG/DL — HIGH (ref 10–20)
CALCIUM SERPL-MCNC: 9 MG/DL — SIGNIFICANT CHANGE UP (ref 8.4–10.5)
CHLORIDE SERPL-SCNC: 115 MMOL/L — HIGH (ref 98–110)
CO2 SERPL-SCNC: 18 MMOL/L — SIGNIFICANT CHANGE UP (ref 17–32)
CREAT SERPL-MCNC: 1.5 MG/DL — SIGNIFICANT CHANGE UP (ref 0.7–1.5)
CULTURE RESULTS: SIGNIFICANT CHANGE UP
EGFR: 35 ML/MIN/1.73M2 — LOW
EOSINOPHIL # BLD AUTO: 0.48 K/UL — SIGNIFICANT CHANGE UP (ref 0–0.7)
EOSINOPHIL NFR BLD AUTO: 4.3 % — SIGNIFICANT CHANGE UP (ref 0–8)
GAS PNL BLDA: SIGNIFICANT CHANGE UP
GLUCOSE BLDC GLUCOMTR-MCNC: 153 MG/DL — HIGH (ref 70–99)
GLUCOSE BLDC GLUCOMTR-MCNC: 163 MG/DL — HIGH (ref 70–99)
GLUCOSE BLDC GLUCOMTR-MCNC: 175 MG/DL — HIGH (ref 70–99)
GLUCOSE BLDC GLUCOMTR-MCNC: 201 MG/DL — HIGH (ref 70–99)
GLUCOSE SERPL-MCNC: 155 MG/DL — HIGH (ref 70–99)
HCT VFR BLD CALC: 22.1 % — LOW (ref 37–47)
HCT VFR BLD CALC: 22.3 % — LOW (ref 37–47)
HGB BLD-MCNC: 7.2 G/DL — LOW (ref 12–16)
HGB BLD-MCNC: 7.4 G/DL — LOW (ref 12–16)
IMM GRANULOCYTES NFR BLD AUTO: 1.2 % — HIGH (ref 0.1–0.3)
LYMPHOCYTES # BLD AUTO: 1.42 K/UL — SIGNIFICANT CHANGE UP (ref 1.2–3.4)
LYMPHOCYTES # BLD AUTO: 12.6 % — LOW (ref 20.5–51.1)
MAGNESIUM SERPL-MCNC: 2.2 MG/DL — SIGNIFICANT CHANGE UP (ref 1.8–2.4)
MCHC RBC-ENTMCNC: 29.8 PG — SIGNIFICANT CHANGE UP (ref 27–31)
MCHC RBC-ENTMCNC: 30.6 PG — SIGNIFICANT CHANGE UP (ref 27–31)
MCHC RBC-ENTMCNC: 32.3 G/DL — SIGNIFICANT CHANGE UP (ref 32–37)
MCHC RBC-ENTMCNC: 33.5 G/DL — SIGNIFICANT CHANGE UP (ref 32–37)
MCV RBC AUTO: 91.3 FL — SIGNIFICANT CHANGE UP (ref 81–99)
MCV RBC AUTO: 92.1 FL — SIGNIFICANT CHANGE UP (ref 81–99)
MONOCYTES # BLD AUTO: 1.02 K/UL — HIGH (ref 0.1–0.6)
MONOCYTES NFR BLD AUTO: 9 % — SIGNIFICANT CHANGE UP (ref 1.7–9.3)
NEUTROPHILS # BLD AUTO: 8.15 K/UL — HIGH (ref 1.4–6.5)
NEUTROPHILS NFR BLD AUTO: 72.1 % — SIGNIFICANT CHANGE UP (ref 42.2–75.2)
NRBC # BLD: 0 /100 WBCS — SIGNIFICANT CHANGE UP (ref 0–0)
NRBC # BLD: 0 /100 WBCS — SIGNIFICANT CHANGE UP (ref 0–0)
PHOSPHATE SERPL-MCNC: 5.4 MG/DL — HIGH (ref 2.1–4.9)
PLATELET # BLD AUTO: 188 K/UL — SIGNIFICANT CHANGE UP (ref 130–400)
PLATELET # BLD AUTO: 208 K/UL — SIGNIFICANT CHANGE UP (ref 130–400)
PMV BLD: 10.5 FL — HIGH (ref 7.4–10.4)
PMV BLD: 11 FL — HIGH (ref 7.4–10.4)
POTASSIUM SERPL-MCNC: 3.9 MMOL/L — SIGNIFICANT CHANGE UP (ref 3.5–5)
POTASSIUM SERPL-SCNC: 3.9 MMOL/L — SIGNIFICANT CHANGE UP (ref 3.5–5)
PROT SERPL-MCNC: 4.5 G/DL — LOW (ref 6–8)
RBC # BLD: 2.42 M/UL — LOW (ref 4.2–5.4)
RBC # BLD: 2.42 M/UL — LOW (ref 4.2–5.4)
RBC # FLD: 15.4 % — HIGH (ref 11.5–14.5)
RBC # FLD: 15.6 % — HIGH (ref 11.5–14.5)
SODIUM SERPL-SCNC: 146 MMOL/L — SIGNIFICANT CHANGE UP (ref 135–146)
SPECIMEN SOURCE: SIGNIFICANT CHANGE UP
WBC # BLD: 11.29 K/UL — HIGH (ref 4.8–10.8)
WBC # BLD: 12.98 K/UL — HIGH (ref 4.8–10.8)
WBC # FLD AUTO: 11.29 K/UL — HIGH (ref 4.8–10.8)
WBC # FLD AUTO: 12.98 K/UL — HIGH (ref 4.8–10.8)

## 2024-11-03 PROCEDURE — 99233 SBSQ HOSP IP/OBS HIGH 50: CPT

## 2024-11-03 PROCEDURE — 95720 EEG PHY/QHP EA INCR W/VEEG: CPT

## 2024-11-03 RX ORDER — MULTIVIT WITH MINERALS/LUTEIN
500 TABLET ORAL DAILY
Refills: 0 | Status: DISCONTINUED | OUTPATIENT
Start: 2024-11-03 | End: 2024-11-20

## 2024-11-03 RX ORDER — ZINC SULFATE 50(220)MG
220 TABLET ORAL DAILY
Refills: 0 | Status: DISCONTINUED | OUTPATIENT
Start: 2024-11-03 | End: 2024-11-20

## 2024-11-03 RX ORDER — LABETALOL 100 MG/1
100 TABLET, FILM COATED ORAL EVERY 8 HOURS
Refills: 0 | Status: DISCONTINUED | OUTPATIENT
Start: 2024-11-03 | End: 2024-11-06

## 2024-11-03 RX ORDER — POTASSIUM CHLORIDE 600 MG/1
20 TABLET, EXTENDED RELEASE ORAL ONCE
Refills: 0 | Status: COMPLETED | OUTPATIENT
Start: 2024-11-03 | End: 2024-11-03

## 2024-11-03 RX ORDER — KETAMINE HCL 50MG/ML(1)
1.5 SYRINGE (ML) INTRAVENOUS
Qty: 1000 | Refills: 0 | Status: DISCONTINUED | OUTPATIENT
Start: 2024-11-03 | End: 2024-11-04

## 2024-11-03 RX ORDER — FUROSEMIDE 40 MG/1
40 TABLET ORAL ONCE
Refills: 0 | Status: COMPLETED | OUTPATIENT
Start: 2024-11-03 | End: 2024-11-03

## 2024-11-03 RX ADMIN — FUROSEMIDE 40 MILLIGRAM(S): 40 TABLET ORAL at 12:19

## 2024-11-03 RX ADMIN — LABETALOL 10 MILLIGRAM(S): 100 TABLET, FILM COATED ORAL at 09:20

## 2024-11-03 RX ADMIN — Medication 2: at 12:20

## 2024-11-03 RX ADMIN — NICARDIPINE HYDROCHLORIDE 25 MG/HR: 2.5 INJECTION INTRAVENOUS at 17:18

## 2024-11-03 RX ADMIN — LABETALOL 10 MILLIGRAM(S): 100 TABLET, FILM COATED ORAL at 12:44

## 2024-11-03 RX ADMIN — LABETALOL 10 MILLIGRAM(S): 100 TABLET, FILM COATED ORAL at 15:37

## 2024-11-03 RX ADMIN — Medication 2: at 05:06

## 2024-11-03 RX ADMIN — Medication 4: at 17:18

## 2024-11-03 RX ADMIN — LEVETIRACETAM 750 MILLIGRAM(S): 1000 TABLET ORAL at 18:06

## 2024-11-03 RX ADMIN — PIPERACILLIN SODIUM AND TAZOBACTAM SODIUM 25 GRAM(S): 4; .5 INJECTION, POWDER, LYOPHILIZED, FOR SOLUTION INTRAVENOUS at 14:19

## 2024-11-03 RX ADMIN — PIPERACILLIN SODIUM AND TAZOBACTAM SODIUM 25 GRAM(S): 4; .5 INJECTION, POWDER, LYOPHILIZED, FOR SOLUTION INTRAVENOUS at 05:04

## 2024-11-03 RX ADMIN — POTASSIUM CHLORIDE 20 MILLIEQUIVALENT(S): 600 TABLET, EXTENDED RELEASE ORAL at 09:20

## 2024-11-03 RX ADMIN — LEVETIRACETAM 750 MILLIGRAM(S): 1000 TABLET ORAL at 05:05

## 2024-11-03 RX ADMIN — Medication 9.96 MG/KG/HR: at 11:15

## 2024-11-03 RX ADMIN — Medication 500 MILLIGRAM(S): at 12:20

## 2024-11-03 RX ADMIN — PIPERACILLIN SODIUM AND TAZOBACTAM SODIUM 25 GRAM(S): 4; .5 INJECTION, POWDER, LYOPHILIZED, FOR SOLUTION INTRAVENOUS at 21:22

## 2024-11-03 RX ADMIN — Medication 220 MILLIGRAM(S): at 12:20

## 2024-11-03 RX ADMIN — LACOSAMIDE 50 MILLIGRAM(S): 10 INJECTION INTRAVENOUS at 18:05

## 2024-11-03 RX ADMIN — LABETALOL 100 MILLIGRAM(S): 100 TABLET, FILM COATED ORAL at 14:19

## 2024-11-03 RX ADMIN — LABETALOL 50 MILLIGRAM(S): 100 TABLET, FILM COATED ORAL at 05:05

## 2024-11-03 RX ADMIN — LISINOPRIL 20 MILLIGRAM(S): 20 TABLET ORAL at 05:05

## 2024-11-03 RX ADMIN — LABETALOL 10 MILLIGRAM(S): 100 TABLET, FILM COATED ORAL at 05:04

## 2024-11-03 RX ADMIN — Medication 2: at 23:05

## 2024-11-03 RX ADMIN — LABETALOL 100 MILLIGRAM(S): 100 TABLET, FILM COATED ORAL at 21:22

## 2024-11-03 RX ADMIN — CHLORHEXIDINE GLUCONATE 1 APPLICATION(S): 1.2 RINSE ORAL at 05:05

## 2024-11-03 RX ADMIN — LABETALOL 10 MILLIGRAM(S): 100 TABLET, FILM COATED ORAL at 01:06

## 2024-11-03 RX ADMIN — Medication 2: at 00:01

## 2024-11-03 RX ADMIN — LACOSAMIDE 50 MILLIGRAM(S): 10 INJECTION INTRAVENOUS at 05:04

## 2024-11-03 RX ADMIN — PANTOPRAZOLE SODIUM 40 MILLIGRAM(S): 40 TABLET, DELAYED RELEASE ORAL at 12:20

## 2024-11-03 NOTE — PROGRESS NOTE ADULT - SUBJECTIVE AND OBJECTIVE BOX
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 While in NSICU, patient underwent MMA embolization and post operatively was stable, however, on 10/16 she was noted to have leaking from her crani site. STAT CTH showed increase in SDH and extracalvarial collection, NSG removed 25cc of blood and was re-sutured. Patient was then downgraded to the floor, was started on zosyn for UTI, and was discharged to short term rehab on 10/27. Today, patient was more lethargic and presented to the ED for evaluation. CTH showing stable SDH, CTA negative for acute stroke. NSx and NSICU consulted for management.      24- Ketamine trial - 0.5 mg/kg/hr      REVIEW OF SYSTEMS: [X ] Unable to Assess due to neurologic exam     Neuro: [ ] Headache [ ] Back pain [ ] Numbness [ ] Weakness [ ] Ataxia [ ] Dizziness [ ] Aphasia [ ] Dysarthria [ ] Visual disturbance  Resp: [ ] Shortness of breath/dyspnea, [ ] Orthopnea [ ] Cough  CV: [ ] Chest pain [ ] Palpitation [ ] Lightheadedness [ ] Syncope  Renal: [ ] Thirst [ ] Edema  GI: [ ] Nausea [ ] Emesis [ ] Abdominal pain [ ] Constipation [ ] Diarrhea  Hem: [ ] Hematemesis [ ] bright red blood per rectum  ID: [ ] Fever [ ] Chills [ ] Dysuria  ENT: [ ] Rhinorrhea    ICU Vital Signs Last 24 Hrs  T(C): 37.2 (2024 08:00), Max: 37.2 (2024 08:00)  T(F): 99 (2024 08:00), Max: 99 (2024 08:00)  HR: 76 (2024 10:00) (76 - 104)  BP: 139/59 (2024 10:00) (124/60 - 155/70)  BP(mean): 85 (2024 10:00) (81 - 104)  RR: 21 (2024 10:00) (11 - 23)  SpO2: 99% (2024 10:00) (99% - 100%)    O2 Parameters below as of 2024 10:00  Patient On (Oxygen Delivery Method): nasal cannula w/ humidification  O2 Flow (L/min): 2        2024 08:01  -  2024 07:00  --------------------------------------------------------  IN:    Glucerna: 510 mL    IV PiggyBack: 250 mL    Ketamine: 13.2 mL    Ketamine: 15 mL    Ketamine: 59.4 mL    NiCARdipine: 150 mL    NiCARdipine: 87.5 mL    sodium chloride 0.9%: 150 mL  Total IN: 1235.1 mL    OUT:    Indwelling Catheter - Urethral (mL): 1110 mL  Total OUT: 1110 mL    Total NET: 125.1 mL      2024 07:01  -  2024 10:46  --------------------------------------------------------  IN:    Ketamine: 19.8 mL  Total IN: 19.8 mL    OUT:    Indwelling Catheter - Urethral (mL): 135 mL    Rectal Tube (mL): 200 mL  Total OUT: 335 mL    Total NET: -315.2 mL            2024 07:01  -  2024 10:42  --------------------------------------------------------  IN:    Ketamine: 19.8 mL  Total IN: 19.8 mL    OUT:    Indwelling Catheter - Urethral (mL): 135 mL    Rectal Tube (mL): 200 mL  Total OUT: 335 mL    Total NET: -315.2 mL        MEDICATIONS  (STANDING):  chlorhexidine 2% Cloths 1 Application(s) Topical <User Schedule>  dextrose 5%. 1000 milliLiter(s) (100 mL/Hr) IV Continuous <Continuous>  dextrose 5%. 1000 milliLiter(s) (50 mL/Hr) IV Continuous <Continuous>  dextrose 50% Injectable 25 Gram(s) IV Push once  dextrose 50% Injectable 25 Gram(s) IV Push once  dextrose 50% Injectable 12.5 Gram(s) IV Push once  glucagon  Injectable 1 milliGRAM(s) IntraMuscular once  insulin lispro (ADMELOG) corrective regimen sliding scale   SubCutaneous every 6 hours  ketamine Infusion. 1 mG/kG/Hr (6.64 mL/Hr) IV Continuous <Continuous>  labetalol 50 milliGRAM(s) Oral every 8 hours  lacosamide Solution 50 milliGRAM(s) Oral every 12 hours  levETIRAcetam  Solution 750 milliGRAM(s) Enteral Tube every 12 hours  lisinopril 20 milliGRAM(s) Oral daily  niCARdipine Infusion 5 mG/Hr (25 mL/Hr) IV Continuous <Continuous>  pantoprazole   Suspension 40 milliGRAM(s) Oral daily  piperacillin/tazobactam IVPB.. 3.375 Gram(s) IV Intermittent every 8 hours        146  |  115[H]  |  35[H]  ----------------------------<  155[H]  Corrected AG - 16   3.9   |  18  |  1.5    Ca    9.0      2024 06:04  Phos  5.4       Mg     2.2         TPro  4.5[L]  /  Alb  3.3[L]  /  TBili  0.7  /  DBili  x   /  AST  18  /  ALT  21  /  AlkPhos  77      CAPILLARY BLOOD GLUCOSE  POCT Blood Glucose.: 163 mg/dL (2024 05:02)  POCT Blood Glucose.: 163 mg/dL (2024 23:39)  POCT Blood Glucose.: 175 mg/dL (2024 17:25)  POCT Blood Glucose.: 132 mg/dL (2024 11:50)            146  |  114[H]  |  33[H]  ----------------------------<  73- Corrected 15.5 AG   4.3   |  18  |  1.4    Ca    9.0      2024 04:38  Phos  5.4     11-  Mg     1.9     11-    TPro  4.9[L]  /  Alb  3.5  /  TBili  1.2  /  DBili  x   /  AST  20  /  ALT  25  /  AlkPhos  84  11-                          7.2    11.29 )-----------( 188      ( 2024 06:04 )             22.3                             8.3    8.81  )-----------( 213      ( 2024 04:38 )             24.9         Urinalysis with Rflx Culture (collected 2024 02:50)    Culture - Blood (collected 31 Oct 2024 12:10)  Source: .Blood BLOOD  Preliminary Report (2024 22:02):    No growth at 24 hours    Culture - Blood (collected 31 Oct 2024 12:10)  Source: .Blood BLOOD  Preliminary Report (2024 22:02):    No growth at 24 hours    UA - 59 WBC / Leuco esterase     CXR - B/L effusions     CT Head No Cont (24 @ 00:20)    IMPRESSION:    Essentially stable exam compared to CT head from 10/31/2024.    < end of copied text >     Xray Chest 1 View-PORTABLE IMMEDIATE (10.31.24 @ 14:30) >  IMPRESSION:    Unchanged bibasilar opacities and effusions.     CT Angio Neck w/ IV Cont (10.31.24 @ 12:59) >  IMPRESSION:  CT HEAD:  No significant change in appearance of a residual mixed density right   hemispheric subdural hematoma status post craniotomy for evacuation with   minimal leftward midline shift.    Similar focal vasogenic edema within the right parietal lobe which may   reflect subacute infarct or traumatic contusion.    CTA HEAD:  No evidence of flow-limiting stenosis, occlusion or aneurysm.    CTA NECK:  No evidence of carotid or vertebral artery stenosis.    Large bilateral pleural effusions.    VEEG in the last 14 hours:-     Background - continuous, less than optimally organized, reaching frequencies in the range of 4-5 Hz     Focal and generalized slowin. moderate to severe generalized slowing  2. bilateral posterior quadrants focal slowing, right significantly more than left    Interictal activity:  1. Continues GPDs of mixed morphology: triphasic waves, spike, and sharp waves.   2., isolated right hemispheric spikes and polyspikes    Events - none    Seizures - none    Impression: Abnormal VEEG   - Diffuse slowing consistent with diffuse cortical dysfunction   - Continuous GPDs consistent with cortical irritability, typically of toxic/metabolic etiology and epileptic potential       Physical Exam: PHYSICAL EXAM:  GENERAL: well built, well nourished  EYES:  PERRLA, conjunctiva and sclera clear, R 3rd and L Vith nerve   NECK: No JVD,   Lungs- Clear yet decreased at bases   HEART: S1S2 normal, no S3, Regular rate and rhythm; No murmurs, rubs, or gallops  ABDOMEN: Soft, Nontender, Nondistended; Bowel sounds present  EXTREMITIES:  2+ Peripheral Pulses, edematous B/UE and LE   SKIN:  admitted  stage 2  buttocks - on admission   NEURO: GCS 4,  not opens eyes to noxious stimuli,  gaze  midline, PERRLA, gag/cough, R UPPER  antigravity to noxious, RLE - withdraws , LLE - TF and LUE - Min withdrawal

## 2024-11-03 NOTE — EEG REPORT - NS EEG TEXT BOX
EEG Report [facility] [Authored: 2024 17:47]- for Visit: [id number], Complete, Entered, Signed in Full, Available to Patient    	    TECH INFORMATION:   This is a Continuous Video EEG.     Recording Technique: The patient underwent continuous video-EEG monitoring, using Telemetry System hardware on the [employer] Digital [employer]yt[employer].  EEG and video data were stored on a computer hard drive with important events saved in digital archive files. The material was reviewed by a physician (electroencephalographer/epileptologist) on a daily basis.  Yadiel and seizure detection algorithms were utilized and reviewed.  An EEG Technician attended to the patient for 8 to 10 hours per day. The patient was observed by the epilepsy nursing staff 24 hours per day. The epilepsy center neurologist was available in person or on call 24 hours per day..     Placement and Labeling of Electrodes: The EEG was performed utilizing at least 20 channel referential EEG connections (coronal over temporal over parasagittal montage) with inferior temporal electrodes when indicting and using all standard 10-20 electrode placements with EKG, with additional electrodes placed in the inferior temporal region using the modified 10-10 montage electrode placements for elective admissions, or if deemed necessary.  Recording was at  a sampling rate of 256 samples per second per channel. Time synchronized digital video recording was done simultaneously with EEG recording.  A low light infrared camera was used for low light recording..    EEG REPORT:    EEG Report:  · EEG Report	  Epilepsy Attending Note:     SIMONA KUHN    79y Female  MRN [id number]    Vital Signs Last 24 Hrs  T(C): 37.1 (2024 11:00), Max: 38 (31 Oct 2024 14:00)  T(F): 98.8 (2024 11:00), Max: 100.4 (31 Oct 2024 14:00)  HR: 99 (2024 11:00) (78 - 103)  BP: 145/63 (2024 11:00) (136/100 - 199/74)  BP(mean): 98 (2024 08:00) (98 - 100)  RR: 20 (2024 11:00) (18 - 20)  SpO2: 99% (2024 11:00) (99% - 100%)    Parameters below as of 2024 11:00  Patient On (Oxygen Delivery Method): nasal cannula  O2 Flow (L/min): 3                            7.1    7.79  )-----------( 233      ( 2024 05:39 )             22.0           147[H]  |  113[H]  |  28[H]  ----------------------------<  77  4.2   |  20  |  1.4    Ca    8.7      2024 05:39  Phos  5.3     11  Mg     1.9         TPro  4.8[L]  /  Alb  3.5  /  TBili  0.8  /  DBili  x   /  AST  23  /  ALT  31  /  AlkPhos  86        MEDICATIONS  (STANDING):  chlorhexidine 2% Cloths 1 Application(s) Topical <User Schedule>  dextrose 5%. 1000 milliLiter(s) (100 mL/Hr) IV Continuous <Continuous>  dextrose 5%. 1000 milliLiter(s) (50 mL/Hr) IV Continuous <Continuous>  dextrose 50% Injectable 25 Gram(s) IV Push once  dextrose 50% Injectable 12.5 Gram(s) IV Push once  dextrose 50% Injectable 25 Gram(s) IV Push once  glucagon  Injectable 1 milliGRAM(s) IntraMuscular once  insulin lispro (ADMELOG) corrective regimen sliding scale   SubCutaneous every 6 hours  lacosamide IVPB 50 milliGRAM(s) IV Intermittent every 12 hours  levETIRAcetam   Injectable 750 milliGRAM(s) IV Push every 12 hours  magnesium sulfate  IVPB 2 Gram(s) IV Intermittent once  niCARdipine Infusion 5 mG/Hr (25 mL/Hr) IV Continuous <Continuous>  pantoprazole    Tablet 40 milliGRAM(s) Oral before breakfast  piperacillin/tazobactam IVPB.- 3.375 Gram(s) IV Intermittent once  piperacillin/tazobactam IVPB.. 3.375 Gram(s) IV Intermittent every 8 hours  sodium chloride 0.9%. 1000 milliLiter(s) (50 mL/Hr) IV Continuous <Continuous>  vancomycin  IVPB 1000 milliGRAM(s) IV Intermittent every 24 hours    MEDICATIONS  (PRN):  dextrose Oral Gel 15 Gram(s) Oral once PRN Blood Glucose LESS THAN 70 milliGRAM(s)/deciliter  labetalol Injectable 10 milliGRAM(s) IV Push every 2 hours PRN Systolic blood pressure > 140            VEEG in the last 14 hours:    Background - continuous, less than optimally organized, reaching frequencies in the range of 4-5 Hz     Focal and generalized slowin. moderate to severe generalized slowing  2. bilateral posterior quadrants focal slowing, right significantly more than left    Interictal activity:  1. Continuous  Lof mixed morphology: triphasic waves, spike, and sharp waves. Frequency of 2 Hz in the right hemisphere.   2. Occasional GPDs of 2 Hz, maximal in the right hemisphere.    2., isolated right hemispheric spikes and polyspikes    Events - none    Seizures - none    Impression: Abnormal VEEG   - Diffuse slowing consistent with diffuse cortical dysfunction   - Continuous GPDs consistent with cortical irritability, typically of toxic/metabolic etiology and epileptic potential   - Compared to previous days' recording, there is marked reduction of periodic activity in the left hemisphere.   - No discrete seizures recorded.                EEG Report [facility] [Authored: 2024 17:47]- for Visit: [id number], Complete, Entered, Signed in Full, Available to Patient    	    TECH INFORMATION:   This is a Continuous Video EEG.     Recording Technique: The patient underwent continuous video-EEG monitoring, using Telemetry System hardware on the [employer]yt[employer].  EEG and video data were stored on a computer hard drive with important events saved in digital archive files. The material was reviewed by a physician (electroencephalographer/epileptologist) on a daily basis.  Yadiel and seizure detection algorithms were utilized and reviewed.  An EEG Technician attended to the patient for 8 to 10 hours per day. The patient was observed by the epilepsy nursing staff 24 hours per day. The epilepsy center neurologist was available in person or on call 24 hours per day..     Placement and Labeling of Electrodes: The EEG was performed utilizing at least 20 channel referential EEG connections (coronal over temporal over parasagittal montage) with inferior temporal electrodes when indicting and using all standard 10-20 electrode placements with EKG, with additional electrodes placed in the inferior temporal region using the modified 10-10 montage electrode placements for elective admissions, or if deemed necessary.  Recording was at  a sampling rate of 256 samples per second per channel. Time synchronized digital video recording was done simultaneously with EEG recording.  A low light infrared camera was used for low light recording..    EEG REPORT:    EEG Report:  · EEG Report	  Epilepsy Attending Note:     SIMONA KUHN    79y Female  MRN [id number]    Vital Signs Last 24 Hrs  T(C): 37.1 (2024 11:00), Max: 38 (31 Oct 2024 14:00)  T(F): 98.8 (2024 11:00), Max: 100.4 (31 Oct 2024 14:00)  HR: 99 (2024 11:00) (78 - 103)  BP: 145/63 (2024 11:00) (136/100 - 199/74)  BP(mean): 98 (2024 08:00) (98 - 100)  RR: 20 (2024 11:00) (18 - 20)  SpO2: 99% (2024 11:00) (99% - 100%)    Parameters below as of 2024 11:00  Patient On (Oxygen Delivery Method): nasal cannula  O2 Flow (L/min): 3                            7.1    7.79  )-----------( 233      ( 2024 05:39 )             22.0           147[H]  |  113[H]  |  28[H]  ----------------------------<  77  4.2   |  20  |  1.4    Ca    8.7      2024 05:39  Phos  5.3     11  Mg     1.9         TPro  4.8[L]  /  Alb  3.5  /  TBili  0.8  /  DBili  x   /  AST  23  /  ALT  31  /  AlkPhos  86        MEDICATIONS  (STANDING):  chlorhexidine 2% Cloths 1 Application(s) Topical <User Schedule>  dextrose 5%. 1000 milliLiter(s) (100 mL/Hr) IV Continuous <Continuous>  dextrose 5%. 1000 milliLiter(s) (50 mL/Hr) IV Continuous <Continuous>  dextrose 50% Injectable 25 Gram(s) IV Push once  dextrose 50% Injectable 12.5 Gram(s) IV Push once  dextrose 50% Injectable 25 Gram(s) IV Push once  glucagon  Injectable 1 milliGRAM(s) IntraMuscular once  insulin lispro (ADMELOG) corrective regimen sliding scale   SubCutaneous every 6 hours  lacosamide IVPB 50 milliGRAM(s) IV Intermittent every 12 hours  levETIRAcetam   Injectable 750 milliGRAM(s) IV Push every 12 hours  magnesium sulfate  IVPB 2 Gram(s) IV Intermittent once  niCARdipine Infusion 5 mG/Hr (25 mL/Hr) IV Continuous <Continuous>  pantoprazole    Tablet 40 milliGRAM(s) Oral before breakfast  piperacillin/tazobactam IVPB.- 3.375 Gram(s) IV Intermittent once  piperacillin/tazobactam IVPB.. 3.375 Gram(s) IV Intermittent every 8 hours  sodium chloride 0.9%. 1000 milliLiter(s) (50 mL/Hr) IV Continuous <Continuous>  vancomycin  IVPB 1000 milliGRAM(s) IV Intermittent every 24 hours    MEDICATIONS  (PRN):  dextrose Oral Gel 15 Gram(s) Oral once PRN Blood Glucose LESS THAN 70 milliGRAM(s)/deciliter  labetalol Injectable 10 milliGRAM(s) IV Push every 2 hours PRN Systolic blood pressure > 140            VEEG in the last 14 hours:    Background - continuous, less than optimally organized, reaching frequencies in the range of 4-5 Hz     Focal and generalized slowin. moderate to severe generalized slowing  2. bilateral posterior quadrants focal slowing, right significantly more than left    Interictal activity:  1. Continuous right hemispheric  LPDs of mixed morphology: triphasic waves, spike, and sharp waves with a frequency of 2 Hz.  Near the end of recording, the LPDs wax and wane. The relationship to stimuli is not recorded.   2. Occasional GPDs of 2 Hz, maximal in the right hemisphere.        Events - none    Seizures - none    Impression: Abnormal VEEG   - Diffuse slowing consistent with diffuse cortical dysfunction   - Continuous LPDs consistent with cortical irritability, typically of toxic/metabolic etiology and epileptic potential   - Occasional GPDs, maximal in the right hemisphere, markedly decreased from previous.   - Compared to previous days' recording, there is marked reduction of periodic activity in the left hemisphere.   - No discrete seizures recorded.

## 2024-11-03 NOTE — PROGRESS NOTE ADULT - ASSESSMENT
79F w/ PMHx of MDS, DM, HTN, CKD, recent admission for SDH s/p crani, evac and MMA embolization, presents with decreased MS, found to have stable SDH, admitted to NSICU for r/o status epilepticus     #SDH  #R/o Status epilepticus  #HTN  #MDS  #CKD  #Fever    Plan:    Neurological: SDH   - Trial of ketamine 0.5---> 1.5  mg/kg/hr   - Neuro Checks Q2 NC  - Repeat CTH this evening- monitor increased heme in R SDH - Stable  11/1/24   - MR Brain when stable  - vEEG  -  Keppra 750 gramq 12  - vimpat  50mg q 12   - Analgesia/Sedation: Tylenol PRN  - PT/OT    Cardiovascular:HTN   - SBP goal: 110 -  <150  - Normovolemia  -  lisinopril 20mg q day , labetalol,100mg q 8   Hold amlodipine  - Echo  TTE Echo Complete w/o Contrast w/ Doppler (10.09.24 @ 12:41) >Normal global left ventricular systolic function. Left ventricular ejection fraction, by visual estimation, is 65 to 70%  - EKG- QTc     Pulmonary:  L NC - humidified   - Wean to FIO2 > 92 %   - HOB 45  - Aspiration precautions  - Ongoing GOC discussion with family regarding airway status - low threshold to intubate, patients physical exam and mental status improving after keppra load, will monitor for now.  -  Mouth care per protocol    GI:  - Darren feeding tube - Glycerna -  60 cc/hr - 18 hr feed/ Loose stools   - Banatrol + metamucil  q day   - lasix 40mg X1   - Dysphagia screen- fail   - Protonix (home medication)  - Bowel Regimen: - on hold   - LBM: 11/3/24     /Renal:- AG Met acidosis -   - MAG - likely from unmeasurable   - Small for 24 hrs  - lasix 20mg x1   - Sodium Goal: 135 - 145  - Replete as needed.  - Mg > 2    Endo: Pre- diabetic   - POCT, ISS,  -180  - A1c: 6  - Lipid Profile:   - TSH: 2.63    ID: leukocytosis (seizure vs infectious) / UTI   - Trend Fever curve and WBC  - Sepsis w/u- Blood cultures, urine + culture pending , CXR-  , procalcitonin  - zosyn 3.375grams  - day #4 /7 ; - MRSA nasal neg   - F/U cultures- urine    - Normothermia    Hematology: MDS/ Leucocytosis  S/P RBC transfusion - 11/1/24 - HGB stable   No CBC and Chem- 7 in am   Chronic blood transfusions  - SCDs    MISC:  Stage 2 - buttocks - Zinc 220mg q day + Vit C 250mg q 12;. Wound consult , Rotate q 2 hrs per protocol   PT/OT [x]  CAM-ICU[x]  Family Updated[x]    Lines/Tubes:  PIV: x 2    Code Status:F    Dispo: NSICU     : Dr Ortiz

## 2024-11-04 LAB
ANION GAP SERPL CALC-SCNC: 12 MMOL/L — SIGNIFICANT CHANGE UP (ref 7–14)
BASOPHILS # BLD AUTO: 0.06 K/UL — SIGNIFICANT CHANGE UP (ref 0–0.2)
BASOPHILS NFR BLD AUTO: 0.6 % — SIGNIFICANT CHANGE UP (ref 0–1)
BUN SERPL-MCNC: 37 MG/DL — HIGH (ref 10–20)
CALCIUM SERPL-MCNC: 8.7 MG/DL — SIGNIFICANT CHANGE UP (ref 8.4–10.4)
CHLORIDE SERPL-SCNC: 111 MMOL/L — HIGH (ref 98–110)
CHOLEST SERPL-MCNC: 90 MG/DL — SIGNIFICANT CHANGE UP
CO2 SERPL-SCNC: 20 MMOL/L — SIGNIFICANT CHANGE UP (ref 17–32)
CORTIS AM PEAK SERPL-MCNC: 12.9 UG/DL — SIGNIFICANT CHANGE UP (ref 6–18.4)
CREAT SERPL-MCNC: 1.5 MG/DL — SIGNIFICANT CHANGE UP (ref 0.7–1.5)
EGFR: 35 ML/MIN/1.73M2 — LOW
EOSINOPHIL # BLD AUTO: 0.5 K/UL — SIGNIFICANT CHANGE UP (ref 0–0.7)
EOSINOPHIL NFR BLD AUTO: 5 % — SIGNIFICANT CHANGE UP (ref 0–8)
GAS PNL BLDA: SIGNIFICANT CHANGE UP
GLUCOSE BLDC GLUCOMTR-MCNC: 134 MG/DL — HIGH (ref 70–99)
GLUCOSE BLDC GLUCOMTR-MCNC: 147 MG/DL — HIGH (ref 70–99)
GLUCOSE BLDC GLUCOMTR-MCNC: 164 MG/DL — HIGH (ref 70–99)
GLUCOSE BLDC GLUCOMTR-MCNC: 190 MG/DL — HIGH (ref 70–99)
GLUCOSE SERPL-MCNC: 162 MG/DL — HIGH (ref 70–99)
HCT VFR BLD CALC: 21.2 % — LOW (ref 37–47)
HDLC SERPL-MCNC: 46 MG/DL — LOW
HGB BLD-MCNC: 7 G/DL — LOW (ref 12–16)
IMM GRANULOCYTES NFR BLD AUTO: 0.6 % — HIGH (ref 0.1–0.3)
LIPID PNL WITH DIRECT LDL SERPL: 26 MG/DL — SIGNIFICANT CHANGE UP
LYMPHOCYTES # BLD AUTO: 1.39 K/UL — SIGNIFICANT CHANGE UP (ref 1.2–3.4)
LYMPHOCYTES # BLD AUTO: 14 % — LOW (ref 20.5–51.1)
MAGNESIUM SERPL-MCNC: 2 MG/DL — SIGNIFICANT CHANGE UP (ref 1.8–2.4)
MCHC RBC-ENTMCNC: 30 PG — SIGNIFICANT CHANGE UP (ref 27–31)
MCHC RBC-ENTMCNC: 33 G/DL — SIGNIFICANT CHANGE UP (ref 32–37)
MCV RBC AUTO: 91 FL — SIGNIFICANT CHANGE UP (ref 81–99)
MONOCYTES # BLD AUTO: 1.02 K/UL — HIGH (ref 0.1–0.6)
MONOCYTES NFR BLD AUTO: 10.3 % — HIGH (ref 1.7–9.3)
NEUTROPHILS # BLD AUTO: 6.9 K/UL — HIGH (ref 1.4–6.5)
NEUTROPHILS NFR BLD AUTO: 69.5 % — SIGNIFICANT CHANGE UP (ref 42.2–75.2)
NON HDL CHOLESTEROL: 44 MG/DL — SIGNIFICANT CHANGE UP
NRBC # BLD: 0 /100 WBCS — SIGNIFICANT CHANGE UP (ref 0–0)
PHOSPHATE SERPL-MCNC: 4.9 MG/DL — SIGNIFICANT CHANGE UP (ref 2.1–4.9)
PLATELET # BLD AUTO: 198 K/UL — SIGNIFICANT CHANGE UP (ref 130–400)
PMV BLD: 11 FL — HIGH (ref 7.4–10.4)
POTASSIUM SERPL-MCNC: 4 MMOL/L — SIGNIFICANT CHANGE UP (ref 3.5–5)
POTASSIUM SERPL-SCNC: 4 MMOL/L — SIGNIFICANT CHANGE UP (ref 3.5–5)
RBC # BLD: 2.33 M/UL — LOW (ref 4.2–5.4)
RBC # FLD: 15.6 % — HIGH (ref 11.5–14.5)
SODIUM SERPL-SCNC: 143 MMOL/L — SIGNIFICANT CHANGE UP (ref 135–146)
T4 FREE SERPL-MCNC: 1.2 NG/DL — SIGNIFICANT CHANGE UP (ref 0.9–1.8)
TRIGL SERPL-MCNC: 86 MG/DL — SIGNIFICANT CHANGE UP
TSH SERPL-MCNC: 4.15 UIU/ML — SIGNIFICANT CHANGE UP (ref 0.27–4.2)
WBC # BLD: 9.93 K/UL — SIGNIFICANT CHANGE UP (ref 4.8–10.8)
WBC # FLD AUTO: 9.93 K/UL — SIGNIFICANT CHANGE UP (ref 4.8–10.8)

## 2024-11-04 PROCEDURE — 99233 SBSQ HOSP IP/OBS HIGH 50: CPT | Mod: GC

## 2024-11-04 PROCEDURE — 95720 EEG PHY/QHP EA INCR W/VEEG: CPT

## 2024-11-04 PROCEDURE — 99233 SBSQ HOSP IP/OBS HIGH 50: CPT

## 2024-11-04 RX ORDER — KETAMINE HCL 50MG/ML(1)
1.25 SYRINGE (ML) INTRAVENOUS
Qty: 1000 | Refills: 0 | Status: DISCONTINUED | OUTPATIENT
Start: 2024-11-04 | End: 2024-11-05

## 2024-11-04 RX ORDER — AMLODIPINE BESYLATE 10 MG/1
10 TABLET ORAL DAILY
Refills: 0 | Status: DISCONTINUED | OUTPATIENT
Start: 2024-11-04 | End: 2024-11-04

## 2024-11-04 RX ORDER — HEPARIN SODIUM,PORCINE 1000/ML
5000 VIAL (ML) INJECTION EVERY 12 HOURS
Refills: 0 | Status: DISCONTINUED | OUTPATIENT
Start: 2024-11-04 | End: 2024-11-20

## 2024-11-04 RX ORDER — AMLODIPINE BESYLATE 10 MG/1
10 TABLET ORAL DAILY
Refills: 0 | Status: DISCONTINUED | OUTPATIENT
Start: 2024-11-04 | End: 2024-11-10

## 2024-11-04 RX ADMIN — LEVETIRACETAM 750 MILLIGRAM(S): 1000 TABLET ORAL at 17:38

## 2024-11-04 RX ADMIN — CHLORHEXIDINE GLUCONATE 1 APPLICATION(S): 1.2 RINSE ORAL at 05:47

## 2024-11-04 RX ADMIN — Medication 500 MILLIGRAM(S): at 12:38

## 2024-11-04 RX ADMIN — Medication 2: at 05:46

## 2024-11-04 RX ADMIN — Medication 8.3 MG/KG/HR: at 14:59

## 2024-11-04 RX ADMIN — LABETALOL 100 MILLIGRAM(S): 100 TABLET, FILM COATED ORAL at 21:50

## 2024-11-04 RX ADMIN — Medication 2: at 12:38

## 2024-11-04 RX ADMIN — LISINOPRIL 20 MILLIGRAM(S): 20 TABLET ORAL at 05:46

## 2024-11-04 RX ADMIN — AMLODIPINE BESYLATE 10 MILLIGRAM(S): 10 TABLET ORAL at 12:38

## 2024-11-04 RX ADMIN — LABETALOL 100 MILLIGRAM(S): 100 TABLET, FILM COATED ORAL at 14:58

## 2024-11-04 RX ADMIN — Medication 220 MILLIGRAM(S): at 12:38

## 2024-11-04 RX ADMIN — LACOSAMIDE 50 MILLIGRAM(S): 10 INJECTION INTRAVENOUS at 17:39

## 2024-11-04 RX ADMIN — Medication 5000 UNIT(S): at 17:38

## 2024-11-04 RX ADMIN — LEVETIRACETAM 750 MILLIGRAM(S): 1000 TABLET ORAL at 05:52

## 2024-11-04 RX ADMIN — LABETALOL 100 MILLIGRAM(S): 100 TABLET, FILM COATED ORAL at 05:46

## 2024-11-04 RX ADMIN — PIPERACILLIN SODIUM AND TAZOBACTAM SODIUM 25 GRAM(S): 4; .5 INJECTION, POWDER, LYOPHILIZED, FOR SOLUTION INTRAVENOUS at 21:50

## 2024-11-04 RX ADMIN — LACOSAMIDE 50 MILLIGRAM(S): 10 INJECTION INTRAVENOUS at 05:46

## 2024-11-04 RX ADMIN — PIPERACILLIN SODIUM AND TAZOBACTAM SODIUM 25 GRAM(S): 4; .5 INJECTION, POWDER, LYOPHILIZED, FOR SOLUTION INTRAVENOUS at 14:58

## 2024-11-04 RX ADMIN — PIPERACILLIN SODIUM AND TAZOBACTAM SODIUM 25 GRAM(S): 4; .5 INJECTION, POWDER, LYOPHILIZED, FOR SOLUTION INTRAVENOUS at 05:46

## 2024-11-04 RX ADMIN — PANTOPRAZOLE SODIUM 40 MILLIGRAM(S): 40 TABLET, DELAYED RELEASE ORAL at 12:38

## 2024-11-04 NOTE — DIETITIAN INITIAL EVALUATION ADULT - ADD RECOMMEND
1. Once medically feasible to advance diet order, recommend: Glucerna 1.2 via route per team, continuous 18hr feeds, total volume: 1440 mL, start rate at 25 mL/hr and increase as tolerated until goal rate of 80 mL/hr is met. ADD no carb prosource 1X/DAILY -- provides 60 kcal, 15g pro total. TF regimen + no carb prosource to provided -- 1788 kcal, 101g pro, 1159 mL free water from formula + add additional water flushes of 50 mL q4hrs -- team to adjust water flushes prn. TF regimen meets >85 - 105% of estimated needs.    High risk follow up  1. Once medically feasible to advance diet order, recommend: Glucerna 1.2 via route per team, continuous 18hr feeds, total volume: 1440 mL, start rate at 25 mL/hr and increase as tolerated until goal rate of 80 mL/hr is met. ADD no carb prosource 1X/DAILY -- provides 60 kcal, 15g pro total. TF regimen + no carb prosource to provide -- 1788 kcal, 101g pro, 1159 mL free water from formula + add additional water flushes of 50 mL q4hrs -- team to adjust water flushes prn. TF regimen meets >85 - 105% of estimated needs.    High risk follow up

## 2024-11-04 NOTE — DIETITIAN INITIAL EVALUATION ADULT - OTHER CALCULATIONS
Using ABW: ENERGY: 2811-8517 kcal/day (25-30 koki/kg); PROTEIN:  g/day (1.2-1.5 g/kg); FLUID: 6369-2167 mL/day (20-25 mL/kg) -- with consideration for age, BMI, edema, acuity of illness, PIs

## 2024-11-04 NOTE — DIETITIAN INITIAL EVALUATION ADULT - NSFNSGIIOFT_GEN_A_CORE
11-03-24 @ 07:01  -  11-04-24 @ 07:00  --------------------------------------------------------  OUT:    Rectal Tube (mL): 400 mL  Total OUT: 400 mL    Total NET: -400 mL      11-04-24 @ 07:01  -  11-04-24 @ 11:31  --------------------------------------------------------  OUT:    Rectal Tube (mL): 100 mL  Total OUT: 100 mL    Total NET: -100 mL

## 2024-11-04 NOTE — PROGRESS NOTE ADULT - SUBJECTIVE AND OBJECTIVE BOX
HPI:  79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 While in NSICU, patient underwent MMA embolization and post operatively was stable, however, on 10/16 she was noted to have leaking from her crani site. STAT CTH showed increase in SDH and extracalvarial collection, NSG removed 25cc of blood and was re-sutured. Patient was then downgraded to the floor, was started on zosyn for UTI, and was discharged to short term rehab on 10/27. Today, patient was more lethargic and presented to the ED for evaluation. CTH showing stable SDH, CTA negative for acute stroke. NSx and NSICU consulted for management.     Interval history  -Patient seen at bedside  -She is on ketamine and remains off mechanical ventilation      ADVANCE DIRECTIVES:     [x ] Full Code [ ] DNR  MOLST  [ ]  Living Will  [ ]   DECISION MAKER(s):  [ ] Health Care Proxy(s)  [ x] Surrogate(s)  [ ] Guardian           Name(s): Phone Number(s):  Spouse Waylon with children      BASELINE (I)ADL(s) (prior to admission):    [place]: [ ]Total  [ ] Moderate [ ]Dependent  Palliative Performance Status Version 2:         %    http://npcrc.org/files/news/palliative_performance_scale_ppsv2.pdf    Allergies    No Known Allergies    Intolerances    MEDICATIONS  (STANDING):  amLODIPine   Tablet 10 milliGRAM(s) Oral daily  ascorbic acid 500 milliGRAM(s) Oral daily  chlorhexidine 2% Cloths 1 Application(s) Topical <User Schedule>  dextrose 5%. 1000 milliLiter(s) (100 mL/Hr) IV Continuous <Continuous>  dextrose 5%. 1000 milliLiter(s) (50 mL/Hr) IV Continuous <Continuous>  dextrose 50% Injectable 25 Gram(s) IV Push once  dextrose 50% Injectable 12.5 Gram(s) IV Push once  dextrose 50% Injectable 25 Gram(s) IV Push once  glucagon  Injectable 1 milliGRAM(s) IntraMuscular once  heparin   Injectable 5000 Unit(s) SubCutaneous every 12 hours  insulin lispro (ADMELOG) corrective regimen sliding scale   SubCutaneous every 6 hours  ketamine Infusion. 1.25 mG/kG/Hr (8.3 mL/Hr) IV Continuous <Continuous>  labetalol 100 milliGRAM(s) Oral every 8 hours  lacosamide Solution 50 milliGRAM(s) Oral every 12 hours  levETIRAcetam  Solution 750 milliGRAM(s) Enteral Tube every 12 hours  lisinopril 20 milliGRAM(s) Oral daily  niCARdipine Infusion 5 mG/Hr (25 mL/Hr) IV Continuous <Continuous>  pantoprazole   Suspension 40 milliGRAM(s) Oral daily  piperacillin/tazobactam IVPB.. 3.375 Gram(s) IV Intermittent every 8 hours  zinc sulfate 220 milliGRAM(s) Oral daily    MEDICATIONS  (PRN):  dextrose Oral Gel 15 Gram(s) Oral once PRN Blood Glucose LESS THAN 70 milliGRAM(s)/deciliter  labetalol Injectable 10 milliGRAM(s) IV Push every 2 hours PRN Systolic blood pressure > 140      PRESENT SYMPTOMS: [ x]Unable to obtain due to poor mentation   Source if other than patient:  [ ]Family   [ ]Team     Pain: [ ]yes [ ]no  ALL PAIN ASSESSMENT COMPONENTS WERE ASKED ABOUT UNLESS OTHERWISE NOTED  QOL impact -   Location -                    Aggravating factors -  Quality -  Radiation -  Timing-  Severity (0-10 scale):  Minimal acceptable level (0-10 scale):     CPOT:  0  https://www.sccm.org/getattachment/axz19q20-0o6w-8w7x-3c0u-8746o8737s2x/Critical-Care-Pain-Observation-Tool-(CPOT)    PAIN AD Score:   [email or web address].[email or web address]/painad.pdf (press ctrl +  left click to view)    Dyspnea:                           [ ]None[ ]Mild [ ]Moderate [ ]Severe     Respiratory Distress Observation Scale (RDOS): 0  A score of 0 to 2 signifies little or no respiratory distress, 3 signifies mild distress, scores 4 to 6 indicate moderate distress, and scores greater than 7 signify severe distress  [email or web address]/[email or web address]/files/[email or web address]-omifhcabrpl-inwheuta-tslyayusbni-untbc.pdf    Anxiety:                             [ ]None[ ]Mild [ ]Moderate [ ]Severe   Fatigue:                             [ ]None[ ]Mild [ ]Moderate [ ]Severe   Nausea:                             [ ]None[ ]Mild [ ]Moderate [ ]Severe   Loss of appetite:              [ ]None[ ]Mild [ ]Moderate [ ]Severe   Constipation:                    [ ]None[ ]Mild [ ]Moderate [ ]Severe    Other Symptoms:  [ ]All other review of systems negative     Palliative Performance Status Version 2:         20%    [email or web address]/files/news/palliative_performance_scale_[email or web address].pdf    PHYSICAL EXAM:  Vital Signs Last 24 Hrs  T(C): 36.8 (04 Nov 2024 12:00), Max: 37.2 (03 Nov 2024 16:00)  T(F): 98.3 (04 Nov 2024 12:00), Max: 99 (03 Nov 2024 16:00)  HR: 77 (04 Nov 2024 15:30) (69 - 87)  BP: 123/56 (04 Nov 2024 15:30) (107/51 - 184/73)  BP(mean): 86 (04 Nov 2024 15:30) (74 - 112)  RR: 17 (04 Nov 2024 15:30) (11 - 33)  SpO2: 100% (04 Nov 2024 15:30) (100% - 100%)    Parameters below as of 04 Nov 2024 14:00  Patient On (Oxygen Delivery Method): nasal cannula w/ humidification  O2 Flow (L/min): 2        GENERAL:  [ ] No acute distress [ x]Lethargic  [ x]Unarousable  [ ]Verbal  [ ]Non-Verbal [ ]Cachexia    BEHAVIORAL/PSYCH:  [ ]Alert and Oriented x  [ ] Anxiety [ ] Delirium [ ] Agitation [ ] Calm   EYES: [ ] No scleral icterus [ ] Scleral icterus [x ] Closed  ENMT:  [ ]Dry mouth  [ x]No external oral lesions [ ] No external ear or nose lesions  CARDIOVASCULAR:  [ ]Regular [ ]Irregular [ ]Tachy [ ]Not Tachy  [ ][place] [ ] Edema [ ] No edema  PULMONARY:  [ ]Tachypnea  [ ]Audible excessive secretions [x ] No labored breathing [ ] labored breathing  GASTROINTESTINAL: [ ]Soft  [ ]Distended  [ ]Not distended [ ]Non tender [ ]Tender  MUSCULOSKELETAL: [ ]No clubbing [ ] clubbing  [ ] No cyanosis [ ] cyanosis  NEUROLOGIC: [ ]No focal deficits  [ ]Follows commands  [ x]Does not follow commands  [ ]Cognitive impairment  [ ]Dysphagia  [ ]Dysarthria  [ ]Paresis   SKIN: [ ] Jaundiced [ ] Non-jaundiced [ ]Rash [ ]No Rash [ ] Warm [ ] Dry  MISC/LINES: [ ] ET tube [ ] Trach [ ]NGT/OGT [ ]PEG [ ]Small    LABS: reviewed by me                                   7.0    9.93  )-----------( 198      ( 04 Nov 2024 05:16 )             21.2       11-04    143  |  111[H]  |  37[H]  ----------------------------<  162[H]  4.0   |  20  |  1.5    Ca    8.7      04 Nov 2024 05:16  Phos  4.9     11-04  Mg     2.0     11-04    TPro  4.5[L]  /  Alb  3.3[L]  /  TBili  0.7  /  DBili  x   /  AST  18  /  ALT  21  /  AlkPhos  77  11-03          ABG - ( 04 Nov 2024 11:21 )  pH, Arterial: 7.38  pH, Blood: x     /  pCO2: 33    /  pO2: 171   / HCO3: 20    / Base Excess: -5.1  /  SaO2: 99.0                Urinalysis Basic - ( 04 Nov 2024 05:16 )    Color: x / Appearance: x / SG: x / pH: x  Gluc: 162 mg/dL / Ketone: x  / Bili: x / Urobili: x   Blood: x / Protein: x / Nitrite: x   Leuk Esterase: x / RBC: x / WBC x   Sq Epi: x / Non Sq Epi: x / Bacteria: x                  CAPILLARY BLOOD GLUCOSE      POCT Blood Glucose.: 164 mg/dL (04 Nov 2024 12:28)              RADIOLOGY & ADDITIONAL STUDIES: reviewed by me    < from: CT Head No Cont (11.01.24 @ 00:20) >    IMPRESSION:    Essentially stable exam compared to CT head from 10/31/2024.    < end of copied text >        EKG: reviewed by me    < from: 12 Lead ECG (10.31.24 @ 14:57) >    Ventricular Rate 99 BPM    Atrial Rate 99 BPM    P-R Interval 134 ms    QRS Duration 80 ms    Q-T Interval 310 ms    QTC Calculation(Bazett) 397 ms    P Axis 46 degrees    R Axis 41 degrees    T Axis -26 degrees    Diagnosis Line Normal sinus rhythm  Cannot rule out Anterior infarct , age undetermined  Abnormal ECG    < end of copied text >      PROTEIN CALORIE MALNUTRITION PRESENT: [ ]mild [ ]moderate [ ]severe [ ]underweight [ ]morbid obesity  https://www.andeal.org/vault/2440/web/files/ONC/Table_Clinical%20Characteristics%20to%20Document%20Malnutrition-White%20JV%20et%20al%299975.pdf    Height (cm): 162.6 (10-31-24 @ 11:28), 162.6 (10-09-24 @ 17:30), 157.5 (05-23-24 @ 09:30)  Weight (kg): 61.235 (10-31-24 @ 11:28), 64.3 (10-09-24 @ 17:30), 61.2 (05-23-24 @ 09:30)  BMI (kg/m2): 23.2 (10-31-24 @ 11:28), 24.3 (10-09-24 @ 17:30), 24.7 (05-23-24 @ 09:30)  [ ]PPSV2 < or = to 30% [ ]significant weight loss  [ ]poor nutritional intake  [ ]anasarca      [ ]Artificial Nutrition      Palliative Care Spiritual/Emotional Screening Tool Question  Severity (0-4):                    OR                    [ x] Unable to determine. Will assess at later time if appropriate.  Score of 2 or greater indicates recommendation of Chaplaincy and/or SW referral  Chaplaincy Referral: [ ] Yes [ ] Refused [ ] Following     Caregiver [place]:  [ ] Yes [ ] No    OR    [x ] Unable to determine. Will assess at later time if appropriate.  Social Work Referral [ ]  Patient and Family Centered Care Referral [ ]    Anticipatory Grief Present: [ ] Yes [ ] No    OR     [ x] Unable to determine. Will assess at later time if appropriate.  Social Work Referral [ ]  Patient and Family Centered Care Referral [ ]    Patient discussed with primary medical team MD  Palliative care education provided to patient and/or family

## 2024-11-04 NOTE — DIETITIAN INITIAL EVALUATION ADULT - NSFNSPHYEXAMSKINFT_GEN_A_CORE
Stage II to Right: gluteal/buttock  Possible stage II to sacrum    No wound care RN note documented this admit

## 2024-11-04 NOTE — DIETITIAN INITIAL EVALUATION ADULT - NUTRITIONGOAL OUTCOME1
Pt to meet >85 - 105% of estimated needs via TF within 3-5 days if within GOC and medically feasible.

## 2024-11-04 NOTE — DIETITIAN INITIAL EVALUATION ADULT - OTHER INFO
Pertinent Medical Information: Pt is a 78 y/o female w/ PMHx of MDS, DM, HTN, CKD, recent admission for SDH s/p crani, evac and MMA embolization, presents with decreased MS, found to have stable SDH, admitted to NSICU for r/o status epilepticus.  Per MD note on 11/4:   GI:  NPO except meds given need for airway monitoring   - Darren feeding tube - glucerna   - Banatrol + metamucil  q day   - Dysphagia screen- fail   - Protonix (home medication)    Pertinent Subjective Information: Pt tolerating TF regimen per staff.     Weight hx: UBW unknown. Current dosing weight is 66.4 KG. Previous admission weight was  Pertinent Medical Information: Pt is a 78 y/o female w/ PMHx of MDS, DM, HTN, CKD, recent admission for SDH s/p crani, evac and MMA embolization, presents with decreased MS, found to have stable SDH, admitted to NSICU for r/o status epilepticus.  Per MD note on 11/4:   GI:  NPO except meds given need for airway monitoring   - Darren feeding tube - glucerna   - Banatrol + metamucil  q day   - Dysphagia screen- fail   - Protonix (home medication)    Weight hx: UBW unknown. Current dosing weight is 66.4 KG. Previous admission weight was 64.3 KG on 10/9/24 per HIE. 3% unintentional weight gain in over 3 weeks compared to current dosing weight. Pt does not meet weight criteria for malnutrition at this time.

## 2024-11-04 NOTE — DIETITIAN INITIAL EVALUATION ADULT - FLUID ACCUMULATION
Edema 2+ to right foot; left foot; right hand; left hand; right wrist; left wrist; right arm; left arm

## 2024-11-04 NOTE — EEG REPORT - NS EEG TEXT BOX
Epilepsy Attending Note:     SIMONA KUHN    79y Female  MRN [id number]    Vital Signs Last 24 Hrs  T(C): 36.8 (04 Nov 2024 08:00), Max: 37.2 (03 Nov 2024 16:00)  T(F): 98.2 (04 Nov 2024 08:00), Max: 99 (03 Nov 2024 16:00)  HR: 85 (04 Nov 2024 11:15) (69 - 87)  BP: 129/60 (04 Nov 2024 11:15) (107/51 - 184/73)  BP(mean): 86 (04 Nov 2024 11:15) (74 - 116)  RR: 15 (04 Nov 2024 11:15) (10 - 33)  SpO2: 100% (04 Nov 2024 11:15) (100% - 100%)    Parameters below as of 04 Nov 2024 10:00  Patient On (Oxygen Delivery Method): nasal cannula w/ humidification  O2 Flow (L/min): 2                            7.0    9.93  )-----------( 198      ( 04 Nov 2024 05:16 )             21.2       11-04    143  |  111[H]  |  37[H]  ----------------------------<  162[H]  4.0   |  20  |  1.5    Ca    8.7      04 Nov 2024 05:16  Phos  4.9     11-04  Mg     2.0     11-04    TPro  4.5[L]  /  Alb  3.3[L]  /  TBili  0.7  /  DBili  x   /  AST  18  /  ALT  21  /  AlkPhos  77  11-03      MEDICATIONS  (STANDING):  amLODIPine   Tablet 10 milliGRAM(s) Oral daily  ascorbic acid 500 milliGRAM(s) Oral daily  chlorhexidine 2% Cloths 1 Application(s) Topical <User Schedule>  dextrose 5%. 1000 milliLiter(s) (100 mL/Hr) IV Continuous <Continuous>  dextrose 5%. 1000 milliLiter(s) (50 mL/Hr) IV Continuous <Continuous>  dextrose 50% Injectable 25 Gram(s) IV Push once  dextrose 50% Injectable 12.5 Gram(s) IV Push once  dextrose 50% Injectable 25 Gram(s) IV Push once  glucagon  Injectable 1 milliGRAM(s) IntraMuscular once  heparin   Injectable 5000 Unit(s) SubCutaneous every 12 hours  insulin lispro (ADMELOG) corrective regimen sliding scale   SubCutaneous every 6 hours  ketamine Infusion. 1.5 mG/kG/Hr (9.96 mL/Hr) IV Continuous <Continuous>  labetalol 100 milliGRAM(s) Oral every 8 hours  lacosamide Solution 50 milliGRAM(s) Oral every 12 hours  levETIRAcetam  Solution 750 milliGRAM(s) Enteral Tube every 12 hours  lisinopril 20 milliGRAM(s) Oral daily  niCARdipine Infusion 5 mG/Hr (25 mL/Hr) IV Continuous <Continuous>  pantoprazole   Suspension 40 milliGRAM(s) Oral daily  piperacillin/tazobactam IVPB.. 3.375 Gram(s) IV Intermittent every 8 hours  zinc sulfate 220 milliGRAM(s) Oral daily    MEDICATIONS  (PRN):  dextrose Oral Gel 15 Gram(s) Oral once PRN Blood Glucose LESS THAN 70 milliGRAM(s)/deciliter  labetalol Injectable 10 milliGRAM(s) IV Push every 2 hours PRN Systolic blood pressure > 140            VEEG in the last 24 hours:    Background------ continues , asymmetrical with better expression of the PDR from the left side reaching 4-5 hz    Focal and generalized slowing---------  moderate generalized slowing. 2- moderate right hemispheric FT> Fc slowing    Interictal activity-------------During the early few hours of the recoding there are abundant of right hemispheric spikes presented in periodic pattern, Gradully the frequency of these discharges decreases and seen as occasional to frequent numbers    Events--------------  none    Seizures-------none    Impression:   abnormal as above    Plan - as/neurology

## 2024-11-04 NOTE — PROGRESS NOTE ADULT - ASSESSMENT
79F w/ PMHx of MDS, DM, HTN, CKD, recent admission for SDH s/p crani, evac and MMA embolization, presents with decreased MS, found to have stable SDH, admitted to NSICU for r/o status epilepticus     #SDH  #R/o Status epilepticus  #HTN  #MDS  #CKD  #Fever  r/o CSD     Plan:    Neurological: SDH   - Trial of ketamine 1.5  mg/kg/hr - f/u EEG and will begin deescalation pending findings   - Neuro Checks Q2 NC  CTH stable on 11/1  - MR Brain when stable  - vEEG  -  Keppra 750 gram q 12  - vimpat  50mg q 12   - Analgesia/Sedation: Tylenol PRN  - PT/OT    Cardiovascular:HTN   - SBP goal: 110 -  <150  pt was diuresed on 11/3  -  lisinopril 20mg q day , labetalol,100mg q 8; resume home norvasc   - Echo  TTE Echo Complete w/o Contrast w/ Doppler (10.09.24 @ 12:41) >Normal global left ventricular systolic function. Left ventricular ejection fraction, by visual estimation, is 65 to 70%  - EKG- QTc     Pulmonary:  L NC - humidified   - Wean to FIO2 > 92 %   etCO2; obtain ABG   - [facility] 45  - Aspiration precautions  - Ongoing GOC discussion with family regarding airway status - low threshold to intubate; respiratory monitoring while on ketamine gtt  -  Mouth care per protocol    GI:  NPO except meds given need for airway monitoring   - Darren feeding tube - glucerna   - Banatrol + metamucil  q day   - Dysphagia screen- fail   - Protonix (home medication)  rectal tube  LBM 11/4    /Renal:- AG Met acidosis -   - MAG - likely from unmeasurable   - Small for 24 hrs  s/p lasix on 11/3  - Sodium Goal: 135 - 145  - Replete as needed.  - Mg > 2    Endo: Pre- diabetic   - POCT, ISS,  -180  - A1c: 6  - Lipid Profile:   - TSH: 2.63    ID: leukocytosis (seizure vs infectious) / UTI   - Trend Fever curve and WBC  - zosyn 3.375grams  - day 5 /7 ; - MRSA nasal neg   - F/U cultures- urine - e faecalis pending sensitivity   - Normothermia    Hematology: MDS/ Leucocytosis  S/P RBC transfusion - 11/1/24 - HGB stable   Chronic blood transfusions  - SCDs, chemoppx to be initiated- pt without sequelae of bleeding  anemia likely in setting of MDS     MISC:  Stage 2 - buttocks - Zinc 220mg q day + Vit C 250mg q 12;. Wound consult , Rotate q 2 hrs per protocol   PT/OT [x]  CAM-ICU[x]  Family Updated[x]    Lines/Tubes:  PIV: x 2    Code Status:F    Dispo: NSICU

## 2024-11-04 NOTE — DIETITIAN INITIAL EVALUATION ADULT - PERTINENT MEDS FT
MEDICATIONS  (STANDING):  amLODIPine   Tablet 10 milliGRAM(s) Oral daily  ascorbic acid 500 milliGRAM(s) Oral daily  chlorhexidine 2% Cloths 1 Application(s) Topical <User Schedule>  dextrose 5%. 1000 milliLiter(s) (100 mL/Hr) IV Continuous <Continuous>  dextrose 5%. 1000 milliLiter(s) (50 mL/Hr) IV Continuous <Continuous>  dextrose 50% Injectable 25 Gram(s) IV Push once  dextrose 50% Injectable 12.5 Gram(s) IV Push once  dextrose 50% Injectable 25 Gram(s) IV Push once  glucagon  Injectable 1 milliGRAM(s) IntraMuscular once  heparin   Injectable 5000 Unit(s) SubCutaneous every 12 hours  insulin lispro (ADMELOG) corrective regimen sliding scale   SubCutaneous every 6 hours  ketamine Infusion. 1.5 mG/kG/Hr (9.96 mL/Hr) IV Continuous <Continuous>  labetalol 100 milliGRAM(s) Oral every 8 hours  lacosamide Solution 50 milliGRAM(s) Oral every 12 hours  levETIRAcetam  Solution 750 milliGRAM(s) Enteral Tube every 12 hours  lisinopril 20 milliGRAM(s) Oral daily  niCARdipine Infusion 5 mG/Hr (25 mL/Hr) IV Continuous <Continuous>  pantoprazole   Suspension 40 milliGRAM(s) Oral daily  piperacillin/tazobactam IVPB.. 3.375 Gram(s) IV Intermittent every 8 hours  zinc sulfate 220 milliGRAM(s) Oral daily    MEDICATIONS  (PRN):  dextrose Oral Gel 15 Gram(s) Oral once PRN Blood Glucose LESS THAN 70 milliGRAM(s)/deciliter  labetalol Injectable 10 milliGRAM(s) IV Push every 2 hours PRN Systolic blood pressure > 140

## 2024-11-04 NOTE — PROGRESS NOTE ADULT - SUBJECTIVE AND OBJECTIVE BOX
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 While in NSICU, patient underwent MMA embolization and post operatively was stable, however, on 10/16 she was noted to have leaking from her crani site. STAT CTH showed increase in SDH and extracalvarial collection, NSG removed 25cc of blood and was re-sutured. Patient was then downgraded to the floor, was started on zosyn for UTI, and was discharged to short term rehab on 10/27. Today, patient was more lethargic and presented to the ED for evaluation. CTH showing stable SDH, CTA negative for acute stroke. NSx and NSICU consulted for management.    o/n- tolerating 1.5mg/kg of ketamine gtt       REVIEW OF SYSTEMS: [X ] Unable to Assess due to neurologic exam     Neuro: [ ] Headache [ ] Back pain [ ] Numbness [ ] Weakness [ ] Ataxia [ ] Dizziness [ ] Aphasia [ ] Dysarthria [ ] Visual disturbance  Resp: [ ] Shortness of breath/dyspnea, [ ] Orthopnea [ ] Cough  CV: [ ] Chest pain [ ] Palpitation [ ] Lightheadedness [ ] Syncope  Renal: [ ] Thirst [ ] Edema  GI: [ ] Nausea [ ] Emesis [ ] Abdominal pain [ ] Constipation [ ] Diarrhea  Hem: [ ] Hematemesis [ ] bright red blood per rectum  ID: [ ] Fever [ ] Chills [ ] Dysuria  ENT: [ ] Rhinorrhea      Physical Exam: PHYSICAL EXAM:  GENERAL: well built, well nourished  EYES:  PERRLA, conjunctiva and sclera clear, R 3rd and L Vith nerve   NECK: No JVD,   Lungs- Clear yet decreased at bases   HEART: S1S2 normal, no S3, Regular rate and rhythm; No murmurs, rubs, or gallops  ABDOMEN: Soft, Nontender, Nondistended; Bowel sounds present  EXTREMITIES:  2+ Peripheral Pulses, edematous B/UE and LE   SKIN:  admitted  stage 2  buttocks - on admission   NEURO: on ketamine gtt at 1.5mg/kg, lethargic, no opens eyes to noxious stimuli,  gaze  midline, PERRLA, gag/cough, R UPPER  antigravity to noxious, RLE - withdraws , LLE - TF and LUE - Min withdrawal

## 2024-11-04 NOTE — DIETITIAN INITIAL EVALUATION ADULT - NAME AND PHONE
Jessica [phone] or TEAMS    Nutrition Interventions: TF; Nutrition Monitoring: Diet order, weights, labs, NFPF, body composition, BM and tolerance to TF regimen

## 2024-11-04 NOTE — DIETITIAN INITIAL EVALUATION ADULT - ORAL INTAKE PTA/DIET HISTORY
Pt unable to participate in nutrition assessment interview at time of visit; semicomatose, not intubated. Hx limited to medical chart.

## 2024-11-04 NOTE — ADVANCED PRACTICE NURSE CONSULT - ASSESSMENT
History of Present Illness:  Reason for Admission: SDH, Seizures  History of Present Illness:   79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 While in NSICU, patient underwent MMA embolization and post operatively was stable, however, on 10/16 she was noted to have leaking from her crani site. STAT CTH showed increase in SDH and extracalvarial collection, NSG removed 25cc of blood and was re-sutured. Patient was then downgraded to the floor, was started on zosyn for UTI, and was discharged to short term rehab on 10/27. Today, patient was more lethargic and presented to the ED for evaluation. CTH showing stable SDH, CTA negative for acute stroke. NSx and NSICU consulted for management.     Patient received lying in bed. Limited mobility. Incontinent of stool. Small in place. High risk for pressure injury development and progression.    Type of Wound: Stage 2 Pressure Injury  Location: Coccyx (present on admission, documented as sacrum, inappropriate documentation)  Measurements: ~[id number]  Tunneling/ Undermining: No  Wound bed: Pink  Wound edges: Intact  Periwound: Macerated  Wound exudate: None  Wound odor: No  Induration, erythema, warmth: No  Wound pain: No    Moisture Associated Skin Damage to right buttock. Multiple areas of irregular pink denuded tissue. No exudate, no odor.

## 2024-11-04 NOTE — PROGRESS NOTE ADULT - ASSESSMENT
79yFemale with history of SDH s/p crani, evac and MMA embolixation, DM, HTN, MDS, CKD presents with AMS.  Patient found to have stable SDH on arrival and admitted to rule out status epilepticus.  Palliative care consulted for GOC.    Spoke with Jose over the phone. She was able to provide a medical history and hospital course. She noted that she had discuss code status with the primary team yesterday. She and her family wish for the patient to be intubated again if needed, and are OK with CPR if needed. They wish for Full Code. Role of palliative care discussed. All questions answered.    Recommendations  -Full code  -ongoing medical management  -ketamine per primary NSICU team  -AEDs per NSICU team  -antibiotics per primary team  -GOC as appropriate  -will follow      MEDD (morphine equivalent daily dose):    Education about palliative care provided to patient/family.  See Recs below.    Please call [phone] with questions or concerns 24/7.   We will continue to follow.

## 2024-11-05 LAB
-  AMPICILLIN: SIGNIFICANT CHANGE UP
-  CIPROFLOXACIN: SIGNIFICANT CHANGE UP
-  LEVOFLOXACIN: SIGNIFICANT CHANGE UP
-  NITROFURANTOIN: SIGNIFICANT CHANGE UP
-  TETRACYCLINE: SIGNIFICANT CHANGE UP
-  VANCOMYCIN: SIGNIFICANT CHANGE UP
ALBUMIN SERPL ELPH-MCNC: 2.6 G/DL — LOW (ref 3.5–5.2)
ALP SERPL-CCNC: 72 U/L — SIGNIFICANT CHANGE UP (ref 30–115)
ALT FLD-CCNC: 17 U/L — SIGNIFICANT CHANGE UP (ref 0–41)
ANION GAP SERPL CALC-SCNC: 13 MMOL/L — SIGNIFICANT CHANGE UP (ref 7–14)
AST SERPL-CCNC: 19 U/L — SIGNIFICANT CHANGE UP (ref 0–41)
BILIRUB SERPL-MCNC: 0.5 MG/DL — SIGNIFICANT CHANGE UP (ref 0.2–1.2)
BUN SERPL-MCNC: 36 MG/DL — HIGH (ref 10–20)
CALCIUM SERPL-MCNC: 8.2 MG/DL — LOW (ref 8.4–10.4)
CHLORIDE SERPL-SCNC: 115 MMOL/L — HIGH (ref 98–110)
CO2 SERPL-SCNC: 17 MMOL/L — SIGNIFICANT CHANGE UP (ref 17–32)
CREAT SERPL-MCNC: 1.4 MG/DL — SIGNIFICANT CHANGE UP (ref 0.7–1.5)
CULTURE RESULTS: ABNORMAL
CULTURE RESULTS: SIGNIFICANT CHANGE UP
CULTURE RESULTS: SIGNIFICANT CHANGE UP
EGFR: 39 ML/MIN/1.73M2 — LOW
GLUCOSE BLDC GLUCOMTR-MCNC: 132 MG/DL — HIGH (ref 70–99)
GLUCOSE BLDC GLUCOMTR-MCNC: 153 MG/DL — HIGH (ref 70–99)
GLUCOSE BLDC GLUCOMTR-MCNC: 165 MG/DL — HIGH (ref 70–99)
GLUCOSE BLDC GLUCOMTR-MCNC: 232 MG/DL — HIGH (ref 70–99)
GLUCOSE SERPL-MCNC: 163 MG/DL — HIGH (ref 70–99)
MAGNESIUM SERPL-MCNC: 1.8 MG/DL — SIGNIFICANT CHANGE UP (ref 1.8–2.4)
METHOD TYPE: SIGNIFICANT CHANGE UP
ORGANISM # SPEC MICROSCOPIC CNT: ABNORMAL
ORGANISM # SPEC MICROSCOPIC CNT: SIGNIFICANT CHANGE UP
PHOSPHATE SERPL-MCNC: 5.1 MG/DL — HIGH (ref 2.1–4.9)
POTASSIUM SERPL-MCNC: 4.7 MMOL/L — SIGNIFICANT CHANGE UP (ref 3.5–5)
POTASSIUM SERPL-SCNC: 4.7 MMOL/L — SIGNIFICANT CHANGE UP (ref 3.5–5)
PROT SERPL-MCNC: 3.7 G/DL — LOW (ref 6–8)
SODIUM SERPL-SCNC: 145 MMOL/L — SIGNIFICANT CHANGE UP (ref 135–146)
SPECIMEN SOURCE: SIGNIFICANT CHANGE UP

## 2024-11-05 PROCEDURE — 99233 SBSQ HOSP IP/OBS HIGH 50: CPT | Mod: GC

## 2024-11-05 PROCEDURE — 70551 MRI BRAIN STEM W/O DYE: CPT | Mod: 26

## 2024-11-05 PROCEDURE — 95720 EEG PHY/QHP EA INCR W/VEEG: CPT

## 2024-11-05 RX ORDER — AMPICILLIN TRIHYDRATE 250 MG/5ML
SUSPENSION, RECONSTITUTED, ORAL (ML) ORAL
Refills: 0 | Status: DISCONTINUED | OUTPATIENT
Start: 2024-11-05 | End: 2024-11-05

## 2024-11-05 RX ORDER — PSYLLIUM SEED
1 POWDER (GRAM) ORAL EVERY 12 HOURS
Refills: 0 | Status: DISCONTINUED | OUTPATIENT
Start: 2024-11-05 | End: 2024-11-11

## 2024-11-05 RX ORDER — AMPICILLIN TRIHYDRATE 250 MG/5ML
1 SUSPENSION, RECONSTITUTED, ORAL (ML) ORAL EVERY 12 HOURS
Refills: 0 | Status: COMPLETED | OUTPATIENT
Start: 2024-11-05 | End: 2024-11-07

## 2024-11-05 RX ADMIN — Medication 500 MILLIGRAM(S): at 12:27

## 2024-11-05 RX ADMIN — LEVETIRACETAM 750 MILLIGRAM(S): 1000 TABLET ORAL at 05:48

## 2024-11-05 RX ADMIN — LABETALOL 100 MILLIGRAM(S): 100 TABLET, FILM COATED ORAL at 15:29

## 2024-11-05 RX ADMIN — LISINOPRIL 20 MILLIGRAM(S): 20 TABLET ORAL at 05:48

## 2024-11-05 RX ADMIN — Medication 2: at 12:26

## 2024-11-05 RX ADMIN — AMLODIPINE BESYLATE 10 MILLIGRAM(S): 10 TABLET ORAL at 05:49

## 2024-11-05 RX ADMIN — Medication 8.3 MG/KG/HR: at 05:50

## 2024-11-05 RX ADMIN — LABETALOL 100 MILLIGRAM(S): 100 TABLET, FILM COATED ORAL at 05:48

## 2024-11-05 RX ADMIN — Medication 220 MILLIGRAM(S): at 12:27

## 2024-11-05 RX ADMIN — PIPERACILLIN SODIUM AND TAZOBACTAM SODIUM 25 GRAM(S): 4; .5 INJECTION, POWDER, LYOPHILIZED, FOR SOLUTION INTRAVENOUS at 05:47

## 2024-11-05 RX ADMIN — Medication 5000 UNIT(S): at 05:47

## 2024-11-05 RX ADMIN — CHLORHEXIDINE GLUCONATE 1 APPLICATION(S): 1.2 RINSE ORAL at 05:49

## 2024-11-05 RX ADMIN — Medication 1 TABLET(S): at 15:29

## 2024-11-05 RX ADMIN — Medication 5000 UNIT(S): at 18:53

## 2024-11-05 RX ADMIN — Medication 1 TABLET(S): at 21:36

## 2024-11-05 RX ADMIN — LABETALOL 100 MILLIGRAM(S): 100 TABLET, FILM COATED ORAL at 21:36

## 2024-11-05 RX ADMIN — Medication 2: at 17:00

## 2024-11-05 RX ADMIN — Medication 108 GRAM(S): at 18:51

## 2024-11-05 RX ADMIN — LACOSAMIDE 50 MILLIGRAM(S): 10 INJECTION INTRAVENOUS at 06:04

## 2024-11-05 RX ADMIN — PANTOPRAZOLE SODIUM 40 MILLIGRAM(S): 40 TABLET, DELAYED RELEASE ORAL at 12:27

## 2024-11-05 RX ADMIN — LACOSAMIDE 50 MILLIGRAM(S): 10 INJECTION INTRAVENOUS at 18:52

## 2024-11-05 RX ADMIN — Medication 4: at 23:46

## 2024-11-05 RX ADMIN — Medication 1 PACKET(S): at 18:53

## 2024-11-05 NOTE — EEG REPORT - NS EEG TEXT BOX
Epilepsy Attending Note:     SIMONA KUHN    79y Female  MRN [id number]    Vital Signs Last 24 Hrs  T(C): 36.8 (2024 08:00), Max: 36.9 (2024 04:00)  T(F): 98.2 (2024 08:00), Max: 98.5 (2024 04:00)  HR: 81 (2024 11:00) (63 - 87)  BP: 137/63 (2024 11:00) (109/54 - 171/69)  BP(mean): 90 (2024 11:00) (78 - 106)  RR: 14 (2024 11:00) (11 - 34)  SpO2: 100% (2024 11:00) (96% - 100%)    Parameters below as of 2024 16:00  Patient On (Oxygen Delivery Method): nasal cannula w/ humidification  O2 Flow (L/min): 2                            7.0    9.93  )-----------( 198      ( 2024 05:16 )             21.2       11-04    143  |  111[H]  |  37[H]  ----------------------------<  162[H]  4.0   |  20  |  1.5    Ca    8.7      2024 05:16  Phos  4.9     11-04  Mg     2.0     11-04        MEDICATIONS  (STANDING):  amLODIPine   Tablet 10 milliGRAM(s) Oral daily  ampicillin  IVPB      ascorbic acid 500 milliGRAM(s) Oral daily  chlorhexidine 2% Cloths 1 Application(s) Topical <User Schedule>  dextrose 5%. 1000 milliLiter(s) (100 mL/Hr) IV Continuous <Continuous>  dextrose 5%. 1000 milliLiter(s) (50 mL/Hr) IV Continuous <Continuous>  dextrose 50% Injectable 25 Gram(s) IV Push once  dextrose 50% Injectable 25 Gram(s) IV Push once  dextrose 50% Injectable 12.5 Gram(s) IV Push once  glucagon  Injectable 1 milliGRAM(s) IntraMuscular once  heparin   Injectable 5000 Unit(s) SubCutaneous every 12 hours  insulin lispro (ADMELOG) corrective regimen sliding scale   SubCutaneous every 6 hours  ketamine Infusion. 1.25 mG/kG/Hr (8.3 mL/Hr) IV Continuous <Continuous>  labetalol 100 milliGRAM(s) Oral every 8 hours  lacosamide Solution 50 milliGRAM(s) Oral every 12 hours  lactobacillus acidophilus 1 Tablet(s) Oral every 8 hours  levETIRAcetam  Solution 750 milliGRAM(s) Enteral Tube every 12 hours  lisinopril 20 milliGRAM(s) Oral daily  niCARdipine Infusion 5 mG/Hr (25 mL/Hr) IV Continuous <Continuous>  pantoprazole   Suspension 40 milliGRAM(s) Oral daily  psyllium Powder 1 Packet(s) Oral every 12 hours  zinc sulfate 220 milliGRAM(s) Oral daily    MEDICATIONS  (PRN):  dextrose Oral Gel 15 Gram(s) Oral once PRN Blood Glucose LESS THAN 70 milliGRAM(s)/deciliter  labetalol Injectable 10 milliGRAM(s) IV Push every 2 hours PRN Systolic blood pressure > 140          VEEG in the last 24 hours:    Background - continuous, asymmetrical with higher amplitude from the right side and breach artifact, PDR reaching frequencies in the range of 5-6 Hz    Focal and generalized slowin. moderate generalized slowing  2. moderate right hemispheric focal slowing    Interictal activity:  1. diffusely expressed triphasic waves  2. right temporal and parasaggital epileptiform sharp waves that often appear in quasi-periodic pattern    Events - none    Seizures - none    Impression: Abnormal VEEG as above    Plan - per NCC team

## 2024-11-05 NOTE — PROGRESS NOTE ADULT - SUBJECTIVE AND OBJECTIVE BOX
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 While in NSICU, patient underwent MMA embolization and post operatively was stable, however, on 10/16 she was noted to have leaking from her crani site. STAT CTH showed increase in SDH and extracalvarial collection, NSG removed 25cc of blood and was re-sutured. Patient was then downgraded to the floor, was started on zosyn for UTI, and was discharged to short term rehab on 10/27. Today, patient was more lethargic and presented to the ED for evaluation. CTH showing stable SDH, CTA negative for acute stroke. NSx and NSICU consulted for management.    o/n- tapering down of ketamine gtt       REVIEW OF SYSTEMS: [X ] Unable to Assess due to neurologic exam     Neuro: [ ] Headache [ ] Back pain [ ] Numbness [ ] Weakness [ ] Ataxia [ ] Dizziness [ ] Aphasia [ ] Dysarthria [ ] Visual disturbance  Resp: [ ] Shortness of breath/dyspnea, [ ] Orthopnea [ ] Cough  CV: [ ] Chest pain [ ] Palpitation [ ] Lightheadedness [ ] Syncope  Renal: [ ] Thirst [ ] Edema  GI: [ ] Nausea [ ] Emesis [ ] Abdominal pain [ ] Constipation [ ] Diarrhea  Hem: [ ] Hematemesis [ ] bright red blood per rectum  ID: [ ] Fever [ ] Chills [ ] Dysuria  ENT: [ ] Rhinorrhea      Physical Exam: PHYSICAL EXAM:  GENERAL: well built, well nourished  EYES:  PERRLA, conjunctiva and sclera clear, R 3rd and L Vith nerve   NECK: No JVD,   Lungs- Clear yet decreased at bases   HEART: S1S2 normal, no S3, Regular rate and rhythm; No murmurs, rubs, or gallops  ABDOMEN: Soft, Nontender, Nondistended; Bowel sounds present  EXTREMITIES:  2+ Peripheral Pulses, edematous B/UE and LE   SKIN:  admitted  stage 2  buttocks - on admission   NEURO: on ketamine gtt at 0.25mg/kg, lethargic, no opens eyes to noxious stimuli,  gaze  midline, PERRLA, gag/cough, R UPPER  antigravity to noxious, RLE - withdraws , LLE - TF and LUE - Min withdrawal

## 2024-11-05 NOTE — PROGRESS NOTE ADULT - ASSESSMENT
79F w/ PMHx of MDS, DM, HTN, CKD, recent admission for SDH s/p crani, evac and MMA embolization, presents with decreased MS, found to have stable SDH, admitted to NSICU for r/o status epilepticus     #SDH  #R/o Status epilepticus  #HTN  #MDS  #CKD  #Fever  r/o CSD     Plan:    Neurological: SDH   - Trial of ketamine gtt now being tapered to off - f/u EEG   - Neuro Checks Q4 NC  CTH stable on 11/1  - MR Brain when stable  - vEEG  -  Keppra 750 gram q 12  - vimpat  50mg q 12   - Analgesia/Sedation: Tylenol PRN  - PT/OT    Cardiovascular:HTN   - SBP goal: 110 -  <150  pt was diuresed on 11/3  -  lisinopril 20mg q day , labetalol,100mg q 8; resume home norvasc   - Echo  TTE Echo Complete w/o Contrast w/ Doppler (10.09.24 @ 12:41) >Normal global left ventricular systolic function. Left ventricular ejection fraction, by visual estimation, is 65 to 70%  - EKG- QTc     Pulmonary:  L NC - humidified   - Wean to FIO2 > 92 %   etco2 goal 35-45, stable  abg stable  - HOB 45  - Aspiration precautions  - Ongoing GOC discussion with family regarding airway status - low threshold to intubate; respiratory monitoring while on ketamine gtt  -  Mouth care per protocol    GI:  ngt in place; can reinitiate diet   - Darren feeding tube - glucerna   - Banatrol + metamucil  q day   - Dysphagia screen- fail   - Protonix (home medication)  rectal tube 11/3  lactobacillus   LBM 11/5    /Renal:- AG Met acidosis -   - MAG - likely from unmeasurable   dc ocampo; place  primafit/bladder scan q 6  s/p lasix on 11/3  - Sodium Goal: 135 - 145  - Replete as needed.  - Mg > 2    Endo: Pre- diabetic   - POCT, ISS,  -180  - A1c: 6  - Lipid Profile:   - TSH: 2.63    ID: leukocytosis (seizure vs infectious) / UTI   - MRSA nasal neg   - F/U cultures- urine - E faecalis sensitive to ampicillin-- change zosyn to ampicillin for final 2 days   - Normothermia    Hematology: MDS/ Leucocytosis  S/P RBC transfusion - 11/1/24 - HGB stable   Chronic blood transfusions  - SCDs, chemoppx to be initiated- pt without sequelae of bleeding  anemia likely in setting of MDS     MISC:  Stage 2 - buttocks - Zinc 220mg q day + Vit C 250mg q 12;. Wound consult , Rotate q 2 hrs per protocol   PT/OT [x]  CAM-ICU[x]  Family Updated[x]    Lines/Tubes:  PIV: x 2    Code Status:F    Dispo: NSICU

## 2024-11-06 LAB
ALBUMIN SERPL ELPH-MCNC: 3.1 G/DL — LOW (ref 3.5–5.2)
ALP SERPL-CCNC: 93 U/L — SIGNIFICANT CHANGE UP (ref 30–115)
ALT FLD-CCNC: 21 U/L — SIGNIFICANT CHANGE UP (ref 0–41)
ANION GAP SERPL CALC-SCNC: 9 MMOL/L — SIGNIFICANT CHANGE UP (ref 7–14)
AST SERPL-CCNC: 12 U/L — SIGNIFICANT CHANGE UP (ref 0–41)
BASOPHILS # BLD AUTO: 0.11 K/UL — SIGNIFICANT CHANGE UP (ref 0–0.2)
BASOPHILS NFR BLD AUTO: 1.1 % — HIGH (ref 0–1)
BILIRUB SERPL-MCNC: 0.4 MG/DL — SIGNIFICANT CHANGE UP (ref 0.2–1.2)
BUN SERPL-MCNC: 40 MG/DL — HIGH (ref 10–20)
CALCIUM SERPL-MCNC: 8.8 MG/DL — SIGNIFICANT CHANGE UP (ref 8.4–10.4)
CHLORIDE SERPL-SCNC: 113 MMOL/L — HIGH (ref 98–110)
CO2 SERPL-SCNC: 19 MMOL/L — SIGNIFICANT CHANGE UP (ref 17–32)
CREAT SERPL-MCNC: 1.4 MG/DL — SIGNIFICANT CHANGE UP (ref 0.7–1.5)
EGFR: 39 ML/MIN/1.73M2 — LOW
EOSINOPHIL # BLD AUTO: 0.13 K/UL — SIGNIFICANT CHANGE UP (ref 0–0.7)
EOSINOPHIL NFR BLD AUTO: 1.3 % — SIGNIFICANT CHANGE UP (ref 0–8)
GLUCOSE BLDC GLUCOMTR-MCNC: 132 MG/DL — HIGH (ref 70–99)
GLUCOSE BLDC GLUCOMTR-MCNC: 213 MG/DL — HIGH (ref 70–99)
GLUCOSE BLDC GLUCOMTR-MCNC: 241 MG/DL — HIGH (ref 70–99)
GLUCOSE SERPL-MCNC: 223 MG/DL — HIGH (ref 70–99)
HCT VFR BLD CALC: 22.9 % — LOW (ref 37–47)
HGB BLD-MCNC: 7.5 G/DL — LOW (ref 12–16)
IMM GRANULOCYTES NFR BLD AUTO: 0.8 % — HIGH (ref 0.1–0.3)
LACOSAMIDE (VIMPAT) RESULT: 4.02 UG/ML — SIGNIFICANT CHANGE UP (ref 1–10)
LYMPHOCYTES # BLD AUTO: 1.08 K/UL — LOW (ref 1.2–3.4)
LYMPHOCYTES # BLD AUTO: 10.8 % — LOW (ref 20.5–51.1)
MAGNESIUM SERPL-MCNC: 1.9 MG/DL — SIGNIFICANT CHANGE UP (ref 1.8–2.4)
MCHC RBC-ENTMCNC: 30.1 PG — SIGNIFICANT CHANGE UP (ref 27–31)
MCHC RBC-ENTMCNC: 32.8 G/DL — SIGNIFICANT CHANGE UP (ref 32–37)
MCV RBC AUTO: 92 FL — SIGNIFICANT CHANGE UP (ref 81–99)
MONOCYTES # BLD AUTO: 1.25 K/UL — HIGH (ref 0.1–0.6)
MONOCYTES NFR BLD AUTO: 12.5 % — HIGH (ref 1.7–9.3)
NEUTROPHILS # BLD AUTO: 7.35 K/UL — HIGH (ref 1.4–6.5)
NEUTROPHILS NFR BLD AUTO: 73.5 % — SIGNIFICANT CHANGE UP (ref 42.2–75.2)
NRBC # BLD: 0 /100 WBCS — SIGNIFICANT CHANGE UP (ref 0–0)
PHOSPHATE SERPL-MCNC: 4.7 MG/DL — SIGNIFICANT CHANGE UP (ref 2.1–4.9)
PLATELET # BLD AUTO: 286 K/UL — SIGNIFICANT CHANGE UP (ref 130–400)
PMV BLD: 11.2 FL — HIGH (ref 7.4–10.4)
POTASSIUM SERPL-MCNC: 4.2 MMOL/L — SIGNIFICANT CHANGE UP (ref 3.5–5)
POTASSIUM SERPL-SCNC: 4.2 MMOL/L — SIGNIFICANT CHANGE UP (ref 3.5–5)
PROT SERPL-MCNC: 4.6 G/DL — LOW (ref 6–8)
RBC # BLD: 2.49 M/UL — LOW (ref 4.2–5.4)
RBC # FLD: 15.7 % — HIGH (ref 11.5–14.5)
SODIUM SERPL-SCNC: 141 MMOL/L — SIGNIFICANT CHANGE UP (ref 135–146)
WBC # BLD: 10 K/UL — SIGNIFICANT CHANGE UP (ref 4.8–10.8)
WBC # FLD AUTO: 10 K/UL — SIGNIFICANT CHANGE UP (ref 4.8–10.8)

## 2024-11-06 PROCEDURE — 99233 SBSQ HOSP IP/OBS HIGH 50: CPT | Mod: GC

## 2024-11-06 PROCEDURE — 95720 EEG PHY/QHP EA INCR W/VEEG: CPT

## 2024-11-06 RX ORDER — FAMOTIDINE 20 MG/1
20 TABLET, FILM COATED ORAL DAILY
Refills: 0 | Status: DISCONTINUED | OUTPATIENT
Start: 2024-11-07 | End: 2024-11-07

## 2024-11-06 RX ORDER — LABETALOL 100 MG/1
200 TABLET, FILM COATED ORAL EVERY 8 HOURS
Refills: 0 | Status: DISCONTINUED | OUTPATIENT
Start: 2024-11-06 | End: 2024-11-06

## 2024-11-06 RX ORDER — LABETALOL 100 MG/1
200 TABLET, FILM COATED ORAL EVERY 8 HOURS
Refills: 0 | Status: DISCONTINUED | OUTPATIENT
Start: 2024-11-06 | End: 2024-11-20

## 2024-11-06 RX ADMIN — Medication 4: at 05:18

## 2024-11-06 RX ADMIN — LABETALOL 200 MILLIGRAM(S): 100 TABLET, FILM COATED ORAL at 14:35

## 2024-11-06 RX ADMIN — Medication 500 MILLIGRAM(S): at 12:52

## 2024-11-06 RX ADMIN — LISINOPRIL 20 MILLIGRAM(S): 20 TABLET ORAL at 05:13

## 2024-11-06 RX ADMIN — LEVETIRACETAM 750 MILLIGRAM(S): 1000 TABLET ORAL at 17:07

## 2024-11-06 RX ADMIN — Medication 5000 UNIT(S): at 17:07

## 2024-11-06 RX ADMIN — Medication 1 TABLET(S): at 14:35

## 2024-11-06 RX ADMIN — Medication 220 MILLIGRAM(S): at 12:52

## 2024-11-06 RX ADMIN — LACOSAMIDE 50 MILLIGRAM(S): 10 INJECTION INTRAVENOUS at 17:07

## 2024-11-06 RX ADMIN — Medication 1 TABLET(S): at 22:04

## 2024-11-06 RX ADMIN — Medication 108 GRAM(S): at 05:16

## 2024-11-06 RX ADMIN — Medication 4: at 17:06

## 2024-11-06 RX ADMIN — LACOSAMIDE 50 MILLIGRAM(S): 10 INJECTION INTRAVENOUS at 05:14

## 2024-11-06 RX ADMIN — Medication 1 TABLET(S): at 05:13

## 2024-11-06 RX ADMIN — Medication 5000 UNIT(S): at 05:14

## 2024-11-06 RX ADMIN — CHLORHEXIDINE GLUCONATE 1 APPLICATION(S): 1.2 RINSE ORAL at 05:13

## 2024-11-06 RX ADMIN — NICARDIPINE HYDROCHLORIDE 25 MG/HR: 2.5 INJECTION INTRAVENOUS at 09:55

## 2024-11-06 RX ADMIN — LABETALOL 100 MILLIGRAM(S): 100 TABLET, FILM COATED ORAL at 05:14

## 2024-11-06 RX ADMIN — LEVETIRACETAM 750 MILLIGRAM(S): 1000 TABLET ORAL at 05:14

## 2024-11-06 RX ADMIN — LABETALOL 200 MILLIGRAM(S): 100 TABLET, FILM COATED ORAL at 22:04

## 2024-11-06 RX ADMIN — Medication 1 PACKET(S): at 17:07

## 2024-11-06 RX ADMIN — Medication 1 PACKET(S): at 05:15

## 2024-11-06 RX ADMIN — Medication 108 GRAM(S): at 17:06

## 2024-11-06 RX ADMIN — AMLODIPINE BESYLATE 10 MILLIGRAM(S): 10 TABLET ORAL at 05:14

## 2024-11-06 NOTE — PROGRESS NOTE ADULT - SUBJECTIVE AND OBJECTIVE BOX
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 While in NSICU, patient underwent MMA embolization and post operatively was stable, however, on 10/16 she was noted to have leaking from her crani site. STAT CTH showed increase in SDH and extracalvarial collection, NSG removed 25cc of blood and was re-sutured. Patient was then downgraded to the floor, was started on zosyn for UTI, and was discharged to short term rehab on 10/27. Today, patient was more lethargic and presented to the ED for evaluation. CTH showing stable SDH, CTA negative for acute stroke. NSx and NSICU consulted for management.    o/n- off ketamine gtt      REVIEW OF SYSTEMS: [X ] Unable to Assess due to neurologic exam     Neuro: [ ] Headache [ ] Back pain [ ] Numbness [ ] Weakness [ ] Ataxia [ ] Dizziness [ ] Aphasia [ ] Dysarthria [ ] Visual disturbance  Resp: [ ] Shortness of breath/dyspnea, [ ] Orthopnea [ ] Cough  CV: [ ] Chest pain [ ] Palpitation [ ] Lightheadedness [ ] Syncope  Renal: [ ] Thirst [ ] Edema  GI: [ ] Nausea [ ] Emesis [ ] Abdominal pain [ ] Constipation [ ] Diarrhea  Hem: [ ] Hematemesis [ ] bright red blood per rectum  ID: [ ] Fever [ ] Chills [ ] Dysuria  ENT: [ ] Rhinorrhea      Physical Exam: PHYSICAL EXAM:  GENERAL: well built, well nourished  EYES:  PERRLA, conjunctiva and sclera clear, R 3rd and L Vith nerve   NECK: No JVD,   Lungs- Clear yet decreased at bases   HEART: S1S2 normal, no S3, Regular rate and rhythm; No murmurs, rubs, or gallops  ABDOMEN: Soft, Nontender, Nondistended; Bowel sounds present  EXTREMITIES:  2+ Peripheral Pulses, edematous B/UE and LE   SKIN:  admitted  stage 2  buttocks - on admission   NEURO: lethargic, opens eyes to noxious stimuli,  midline gaze, PERRLA, R UPPER  WD, RLE - TF , LLE - TF and LUE - Min withdrawal

## 2024-11-06 NOTE — CONSULT NOTE ADULT - SUBJECTIVE AND OBJECTIVE BOX
NUTRITION SUPPORT  -  CONSULT NOTE     79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 While in NSICU, patient underwent MMA embolization and post operatively was stable, however, on 10/16 she was noted to have leaking from her crani site. STAT CTH showed increase in SDH and extracalvarial collection, NSG removed 25cc of blood and was re-sutured. Patient was then downgraded to the floor, was started on zosyn for UTI, and was discharged to short term rehab on 10/27. Today, patient was more lethargic and presented to the ED for evaluation. CTH showing stable SDH, CTA negative for acute stroke. NSx and NSICU consulted for management.    (31 Oct 2024 15:51)    NUTRITION SUPPORT NOTE:  received call from [facility] team this morning requesting evaluation of pt and nutrition tx; + concern with reported diarrhea,  hospital course since readmission 10/31 reviewed  meds and feeds during prior admission noted as well  pt remains not intubated but with low threshold to require intubation.  antimicrobials include vanco, zosyn, ampi, cefepime this admission (additional antimicrobials a few weeks ago as well)  No cathartics presently, but pt had received Miralax and senna until 10/27.    REVIEW OF SYSTEMS:  Negative except as noted above.     PAST MEDICAL/SURGICAL HISTORY:   DM (diabetes mellitus)  MDS (myelodysplastic syndrome)  H/O colonoscopy    ALLERGIES:  No Known Allergies    VITALS:  T(F): 97.4 (11-06 @ 08:00), Max: 98.4 (11-06 @ 00:00)  HR: 86 (11-06 @ 11:00) (76 - 96)  BP: 148/64 (11-06 @ 11:00) (124/54 - 161/67)  RR: 15 (11-06 @ 11:00) (13 - 20)  SpO2: 100% (11-06 @ 11:00) (84% - 100%)    HEIGHT/WEIGHT/BMI:   Height (cm): 155 (11-04), 162.6 (10-09), 157.5 (05-23), 157.5 (03-21) - suspect she's still 157.5 cm tall  Weight (kg): 66.4 (11-01), 64.3 (10-09), 61.2 (05-23), 63 (02-01)  BMI (kg/m2): 27.6 (11-04), 25.1 (11-01), 24.3 (10-09), 24.7 (05-23)    I/Os:   11-05-24 @ 07:01  -  11-06-24 @ 07:00  --------------------------------------------------------  IN:    Enteral Tube Flush: 200 mL    Glucerna: 1040 mL    IV PiggyBack: 200 mL    Ketamine: 6.8 mL    NiCARdipine: 512.5 mL  Total IN: 1959.3 mL  OUT:    Indwelling Catheter - Urethral (mL): 290 mL    Intermittent Catheterization - Urethral (mL): 1200 mL    Rectal Tube (mL): 410 mL  Total OUT: 1900 mL  Total NET: 59.3 mL    STANDING MEDICATIONS:   amLODIPine   Tablet 10 milliGRAM(s) Oral daily  ampicillin  IVPB 1 Gram(s) IV Intermittent every 12 hours  ascorbic acid 500 milliGRAM(s) Oral daily  chlorhexidine 2% Cloths 1 Application(s) Topical <User Schedule>  heparin   Injectable 5000 Unit(s) SubCutaneous every 12 hours  insulin lispro (ADMELOG) corrective regimen sliding scale   SubCutaneous every 6 hours  labetalol 200 milliGRAM(s) Oral every 8 hours  lacosamide Solution 50 milliGRAM(s) Oral every 12 hours  lactobacillus acidophilus 1 Tablet(s) Oral every 8 hours  levETIRAcetam  Solution 750 milliGRAM(s) Enteral Tube every 12 hours  lisinopril 20 milliGRAM(s) Oral daily  magnesium sulfate  IVPB 2 Gram(s) IV Intermittent once  niCARdipine Infusion 5 mG/Hr IV Continuous <Continuous>  psyllium Powder 1 Packet(s) Oral every 12 hours  zinc sulfate 220 milliGRAM(s) Oral daily    LABS:                         7.5    10.00 )-----------( 286      ( 06 Nov 2024 04:55 )             22.9     141  |  113[H]  |  40[H]  ----------------------------<  223[H]          (11-06-24 @ 04:55)  4.2   |  19  |  1.4    Ca    8.8          (11-06-24 @ 04:55)  Phos  4.7         (11-06-24 @ 04:55)  Mg     1.9         (11-06-24 @ 04:55)    TPro  4.6[L]  /  Alb  3.1[L]  /  TBili  0.4  /  DBili  x   /  AST  12  /  ALT  21  /  AlkPhos  93       11-06-24 @ 04:55    Triglycerides, Serum: 86 mg/dL (11-04 @ 05:16)  Folate: 5.1 ng/mL (10-24 @ 11:14)  Vitamin B12, Serum: 1255 pg/mL (10-24 @ 11:14)   Zinc Level, Plasma: ?  A1c: 6.0 % (10-09-24 @ 02:40)    Blood Glucose (Past 24 hours):  213 mg/dL (11-06 @ 05:11)  232 mg/dL (11-05 @ 23:41)  165 mg/dL (11-05 @ 16:54)  153 mg/dL (11-05 @ 12:16)    DIET:   Diet, NPO with Tube Feed:   Tube Feeding Modality: Nasogastric  Glucerna 1.2 Ash (GLUCERNARTH)  Total Volume for 24 Hours (mL): 1440  Continuous  Starting Tube Feed Rate mL per Hour: 25  Increase Tube Feed Rate by (mL): 20     Every 4 hours  Until Goal Tube Feed Rate (mL per Hour): 80  Tube Feed Duration (in Hours): 18  Tube Feed Start Time: 13:00  Banatrol TF     Qty per Day:  4 (11-05-24 @ 11:15) [Active]    RADIOLOGY:   < from: MR Head No Cont (11.05.24 @ 18:10) >  INTERPRETATION:  HISTORY: 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 Subdural hematoma.    TECHNIQUE: Multiplanar multisequence MRI of the brain was performed   without the administration of intravenous contrast, according to standard   protocol.    COMPARISON: Brain MRI performed 4/8/2023. Correlation also made with CT   head performed 11/1/2024.    < from: MR Head No Cont (11.05.24 @ 18:10) >  FINDINGS:  Post right frontoparietal craniotomy for evacuation of   decreased/resolving right supratentorial subdural hematoma with stable   mass effect including 4 mm leftward midline shift, sulcal effacement, and   right parietal vasogenic edema.    There is prominence of the cerebral sulci and fissural spaces reflecting   mild diffuse parenchymal volume loss.    There are stable patchy periventricular FLAIR signalabnormalities   compatible with mild chronic microvascular ischemic changes.    No acute infarction, intracranial hemorrhage, mass, or midline shift. No   abnormal intracranial enhancement is identified.    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 The craniocervical junction is unremarkable.    There is no evidence of hydrocephalus. There are no extra-axial fluid   collections. The skull base flow voids are present. No visualized masses   along thecourse of the 3rd cranial nerve.    The visualized intraorbital contents are normal. Mild left maxillary   mucosal thickening. Moderate right mastoid opacification. Trace left   mastoid opacification The visualized soft tissues and osseous structures  appear normal.      < from: MR Head No Cont (11.05.24 @ 18:10) >  IMPRESSION:  1.  Post right frontoparietal craniotomy for evacuation of   decreased/resolving right supratentorial subdural hematoma with stable   mass effect including 4 mm leftward midline shift, sulcal effacement, and   right parietal vasogenic edema.  2.  No visualized masses along the course of the 3rd cranial nerve.    < end of copied text >

## 2024-11-06 NOTE — GOALS OF CARE CONVERSATION - ADVANCED CARE PLANNING - CONVERSATION DETAILS
Spoke with daughter Jose Kennedy over the phone ([phone]). Discussed current management with her and that she is still obtunted despite extensive workup so far including MRI brain and VEEG. It was discussed with daughter that she may need PEG tube as she has been on NGT almost a week, and obtunded, daughter said to proceed with PEG if needed. Code status discussed with daughter as well, it was explained to daughter if she gets intubated, there is a high likelyhood that she will need trach and ventilator dependent. Daughter verbalized the understanding and mentioned to proceed with Intubation or CPR if needed. Will continue with full code.

## 2024-11-06 NOTE — CONSULT NOTE ADULT - ASSESSMENT
79 y.o female with h/o MDS, HTN, DM, recent R hemispheric SDH 10/8/2024 s/p crani, evac, and MMA embolization  pt readmitted 10/31 for lethargy, r/o status epilepticus.  + dysphagia - nasogastric tube placed for enteral feeding  + diarrhea - dignishield placed   + stage 2 gluteal/ buttock  + hypokalemia and hypomagnesemia undoubtedly r/t loose stools, and have been treated    preliminary thoughts:  - pt has received multiple antimicrobials over the past month  - while she is not currently receiving cathartics or enteral PPI, she did until 10/27  - REE calculated by IJE (1431 k/d), and ASPEN recs (7723-2289 k/d)  - continue Glucena 1.2 - prefer gravity method of feeding in pt without protected airway and with altered mentation -       240 ml over 30-40 min with pt seated upright during and 1/2 hour after feed -- after 2 such feeds tolerated increase to       360 ml over 45 min at 7a, 1 pm, 7 pm   - check poc glucose and give any indicated insulin before each feeding (not q6h); and check phos with am labs  - once stable, increase mid day feed to 480 ml over an hour --> 1200 ml, 1425 kcal, 71 gm protein/d  - agree with use of Banatrol TF up to 3 times a day prn watery stool  - considering Abx history, add FloraStor 250 mg twice a day x 10 days

## 2024-11-06 NOTE — PROGRESS NOTE ADULT - ASSESSMENT
79F w/ PMHx of MDS, DM, HTN, CKD, recent admission for SDH s/p crani, evac and MMA embolization, presents with decreased MS, found to have stable SDH, admitted to NSICU for r/o status epilepticus     #SDH  #R/o Status epilepticus  #HTN  #MDS  #CKD  #Fever  r/o CSD     Plan:    Neurological: SDH   s/p ketamine gtt trial to r/o CSD  f/u EEG- triphasics/interictal activity   - Neuro Checks Q4 NC  CTH stable on 11/1  - MR Brain- without acuity; stable SDH  -  Keppra 750 gram q 12  - vimpat  50mg q 12   obtain levels  - Analgesia/Sedation: Tylenol PRN  - PT/OT  ammonia in AM     Cardiovascular:HTN   - SBP goal: 110 -  <150  pt was diuresed on 11/3  -  lisinopril 20mg q day , labetalol 200mg q 8; resumed home norvasc   taper cardene gtt  - Echo  TTE Echo Complete w/o Contrast w/ Doppler (10.09.24 @ 12:41) >Normal global left ventricular systolic function. Left ventricular ejection fraction, by visual estimation, is 65 to 70%  - EKG- QTc     Pulmonary:  L NC - humidified   - Wean to FIO2 > 92 %   etco2 goal 35-45, stable  abg stable  - HOB 45  - Aspiration precautions  -  Mouth care per protocol    GI: diarrhea- slight improvement  ngt in place; can reinitiate diet   - Darren feeding tube - d/w nutrition regarding more concentrated feeding regimen   - Banatrol + metamucil  q day   - Dysphagia screen- fail   - Protonix (home medication)  rectal tube 11/3  lactobacillus   LBM 11/6  will broach PEG with family     /Renal:- AG Met acidosis - MARCO on CKD improving   - MAG - likely from unmeasurable   primafit/bladder scan q 6  s/p lasix on 11/3  - Sodium Goal: 135 - 145  - Replete as needed.  - Mg > 2    Endo: Pre- diabetic   - POCT, ISS,  -180  - A1c: 6  - Lipid Profile:   - TSH: 2.63    ID: improved leukocytosis (seizure vs infectious) / UTI   - MRSA nasal neg   ampicillin for enterococcus UTI  - Normothermia    Hematology: MDS/ Leucocytosis  S/P RBC transfusion - 11/1/24 - HGB stable   Chronic blood transfusions  - SCDs, chemoppx to be initiated- pt without sequelae of bleeding  anemia likely in setting of MDS     MISC:  Stage 2 - buttocks - Zinc 220mg q day + Vit C 250mg q 12;. Wound consult , Rotate q 2 hrs per protocol   PT/OT [x]  CAM-ICU[x]  Family Updated[x]    Lines/Tubes:  PIV: x 2    Code Status:F; f/u with family regarding GOC     Dispo: NSICU

## 2024-11-06 NOTE — EEG REPORT - NS EEG TEXT BOX
Epilepsy Attending Note:     SIMONA KUHN    79y Female  MRN [id number]    Vital Signs Last 24 Hrs  T(C): 36.3 (2024 08:00), Max: 36.9 (2024 20:00)  T(F): 97.4 (2024 08:00), Max: 98.5 (2024 20:00)  HR: 90 (2024 09:15) (75 - 96)  BP: 133/60 (2024 09:15) (123/58 - 161/67)  BP(mean): 86 (2024 09:15) (78 - 98)  RR: 15 (:15) (13 - 28)  SpO2: 100% (2024 09:15) (84% - 100%)    Parameters below as of 2024 08:00  Patient On (Oxygen Delivery Method): nasal cannula  O2 Flow (L/min): 2                            7.5    10.00 )-----------( 286      ( 2024 04:55 )             22.9       11-06    141  |  113[H]  |  40[H]  ----------------------------<  223[H]  4.2   |  19  |  1.4    Ca    8.8      2024 04:55  Phos  4.7     11-06  Mg     1.9     -06    TPro  4.6[L]  /  Alb  3.1[L]  /  TBili  0.4  /  DBili  x   /  AST  12  /  ALT  21  /  AlkPhos  93  11-06      MEDICATIONS  (STANDING):  amLODIPine   Tablet 10 milliGRAM(s) Oral daily  ampicillin  IVPB 1 Gram(s) IV Intermittent every 12 hours  ascorbic acid 500 milliGRAM(s) Oral daily  chlorhexidine 2% Cloths 1 Application(s) Topical <User Schedule>  dextrose 5%. 1000 milliLiter(s) (100 mL/Hr) IV Continuous <Continuous>  dextrose 5%. 1000 milliLiter(s) (50 mL/Hr) IV Continuous <Continuous>  dextrose 50% Injectable 25 Gram(s) IV Push once  dextrose 50% Injectable 25 Gram(s) IV Push once  dextrose 50% Injectable 12.5 Gram(s) IV Push once  glucagon  Injectable 1 milliGRAM(s) IntraMuscular once  heparin   Injectable 5000 Unit(s) SubCutaneous every 12 hours  insulin lispro (ADMELOG) corrective regimen sliding scale   SubCutaneous every 6 hours  labetalol 200 milliGRAM(s) Oral every 8 hours  lacosamide Solution 50 milliGRAM(s) Oral every 12 hours  lactobacillus acidophilus 1 Tablet(s) Oral every 8 hours  levETIRAcetam  Solution 750 milliGRAM(s) Enteral Tube every 12 hours  lisinopril 20 milliGRAM(s) Oral daily  magnesium sulfate  IVPB 2 Gram(s) IV Intermittent once  niCARdipine Infusion 5 mG/Hr (25 mL/Hr) IV Continuous <Continuous>  psyllium Powder 1 Packet(s) Oral every 12 hours  zinc sulfate 220 milliGRAM(s) Oral daily    MEDICATIONS  (PRN):  dextrose Oral Gel 15 Gram(s) Oral once PRN Blood Glucose LESS THAN 70 milliGRAM(s)/deciliter  labetalol Injectable 10 milliGRAM(s) IV Push every 2 hours PRN Systolic blood pressure > 140            VEEG in the last 24 hours:    Background - continuous, symmetrical with right side and breach artifact, PDR reaching frequencies in the range of 6-7 Hz, showing reactivity. Overall, the background appears less organized and for the most part superimposed by diffusely expressed triphasic morphology discharges that are clearly epileptiform in nature, more specifically from Right FT region.    Focal and generalized slowin. moderate generalized slowing  2. mild to moderate right hemispheric focal slowing    Interictal activity - abundant periodic discharges as described above    Events - none    Seizures - none    Impression: Abnormal VEEG as above    Plan - per NCC team

## 2024-11-07 LAB
ALBUMIN SERPL ELPH-MCNC: 2.8 G/DL — LOW (ref 3.5–5.2)
ALP SERPL-CCNC: 75 U/L — SIGNIFICANT CHANGE UP (ref 30–115)
ALT FLD-CCNC: 15 U/L — SIGNIFICANT CHANGE UP (ref 0–41)
AMMONIA BLD-MCNC: 23 UMOL/L — SIGNIFICANT CHANGE UP (ref 11–55)
ANION GAP SERPL CALC-SCNC: 10 MMOL/L — SIGNIFICANT CHANGE UP (ref 7–14)
APTT BLD: 29.2 SEC — SIGNIFICANT CHANGE UP (ref 27–39.2)
AST SERPL-CCNC: 11 U/L — SIGNIFICANT CHANGE UP (ref 0–41)
BASOPHILS # BLD AUTO: 0.09 K/UL — SIGNIFICANT CHANGE UP (ref 0–0.2)
BASOPHILS # BLD AUTO: 0.13 K/UL — SIGNIFICANT CHANGE UP (ref 0–0.2)
BASOPHILS NFR BLD AUTO: 1.4 % — HIGH (ref 0–1)
BASOPHILS NFR BLD AUTO: 1.6 % — HIGH (ref 0–1)
BILIRUB SERPL-MCNC: 0.5 MG/DL — SIGNIFICANT CHANGE UP (ref 0.2–1.2)
BUN SERPL-MCNC: 35 MG/DL — HIGH (ref 10–20)
CALCIUM SERPL-MCNC: 8.5 MG/DL — SIGNIFICANT CHANGE UP (ref 8.4–10.4)
CHLORIDE SERPL-SCNC: 116 MMOL/L — HIGH (ref 98–110)
CO2 SERPL-SCNC: 17 MMOL/L — SIGNIFICANT CHANGE UP (ref 17–32)
CREAT SERPL-MCNC: 1.1 MG/DL — SIGNIFICANT CHANGE UP (ref 0.7–1.5)
EGFR: 51 ML/MIN/1.73M2 — LOW
EOSINOPHIL # BLD AUTO: 0.11 K/UL — SIGNIFICANT CHANGE UP (ref 0–0.7)
EOSINOPHIL # BLD AUTO: 0.24 K/UL — SIGNIFICANT CHANGE UP (ref 0–0.7)
EOSINOPHIL NFR BLD AUTO: 1.7 % — SIGNIFICANT CHANGE UP (ref 0–8)
EOSINOPHIL NFR BLD AUTO: 2.9 % — SIGNIFICANT CHANGE UP (ref 0–8)
GLUCOSE BLDC GLUCOMTR-MCNC: 110 MG/DL — HIGH (ref 70–99)
GLUCOSE BLDC GLUCOMTR-MCNC: 112 MG/DL — HIGH (ref 70–99)
GLUCOSE BLDC GLUCOMTR-MCNC: 121 MG/DL — HIGH (ref 70–99)
GLUCOSE BLDC GLUCOMTR-MCNC: 184 MG/DL — HIGH (ref 70–99)
GLUCOSE BLDC GLUCOMTR-MCNC: 185 MG/DL — HIGH (ref 70–99)
GLUCOSE BLDC GLUCOMTR-MCNC: 204 MG/DL — HIGH (ref 70–99)
GLUCOSE SERPL-MCNC: 176 MG/DL — HIGH (ref 70–99)
HCT VFR BLD CALC: 19.4 % — LOW (ref 37–47)
HCT VFR BLD CALC: 20.5 % — LOW (ref 37–47)
HCT VFR BLD CALC: 26.7 % — LOW (ref 37–47)
HGB BLD-MCNC: 6.3 G/DL — CRITICAL LOW (ref 12–16)
HGB BLD-MCNC: 6.5 G/DL — CRITICAL LOW (ref 12–16)
HGB BLD-MCNC: 8.8 G/DL — LOW (ref 12–16)
IMM GRANULOCYTES NFR BLD AUTO: 0.6 % — HIGH (ref 0.1–0.3)
IMM GRANULOCYTES NFR BLD AUTO: 0.7 % — HIGH (ref 0.1–0.3)
INR BLD: 1.03 RATIO — SIGNIFICANT CHANGE UP (ref 0.65–1.3)
LEVETIRACETAM SERPL-MCNC: 98.5 UG/ML — HIGH (ref 10–40)
LYMPHOCYTES # BLD AUTO: 0.84 K/UL — LOW (ref 1.2–3.4)
LYMPHOCYTES # BLD AUTO: 1.02 K/UL — LOW (ref 1.2–3.4)
LYMPHOCYTES # BLD AUTO: 12.3 % — LOW (ref 20.5–51.1)
LYMPHOCYTES # BLD AUTO: 13.2 % — LOW (ref 20.5–51.1)
MAGNESIUM SERPL-MCNC: 1.8 MG/DL — SIGNIFICANT CHANGE UP (ref 1.8–2.4)
MCHC RBC-ENTMCNC: 29.5 PG — SIGNIFICANT CHANGE UP (ref 27–31)
MCHC RBC-ENTMCNC: 29.9 PG — SIGNIFICANT CHANGE UP (ref 27–31)
MCHC RBC-ENTMCNC: 30.3 PG — SIGNIFICANT CHANGE UP (ref 27–31)
MCHC RBC-ENTMCNC: 31.7 G/DL — LOW (ref 32–37)
MCHC RBC-ENTMCNC: 32.5 G/DL — SIGNIFICANT CHANGE UP (ref 32–37)
MCHC RBC-ENTMCNC: 33 G/DL — SIGNIFICANT CHANGE UP (ref 32–37)
MCV RBC AUTO: 90.8 FL — SIGNIFICANT CHANGE UP (ref 81–99)
MCV RBC AUTO: 93.2 FL — SIGNIFICANT CHANGE UP (ref 81–99)
MCV RBC AUTO: 93.3 FL — SIGNIFICANT CHANGE UP (ref 81–99)
MONOCYTES # BLD AUTO: 0.86 K/UL — HIGH (ref 0.1–0.6)
MONOCYTES # BLD AUTO: 1.3 K/UL — HIGH (ref 0.1–0.6)
MONOCYTES NFR BLD AUTO: 13.5 % — HIGH (ref 1.7–9.3)
MONOCYTES NFR BLD AUTO: 15.6 % — HIGH (ref 1.7–9.3)
NEUTROPHILS # BLD AUTO: 4.42 K/UL — SIGNIFICANT CHANGE UP (ref 1.4–6.5)
NEUTROPHILS # BLD AUTO: 5.57 K/UL — SIGNIFICANT CHANGE UP (ref 1.4–6.5)
NEUTROPHILS NFR BLD AUTO: 66.9 % — SIGNIFICANT CHANGE UP (ref 42.2–75.2)
NEUTROPHILS NFR BLD AUTO: 69.6 % — SIGNIFICANT CHANGE UP (ref 42.2–75.2)
NRBC # BLD: 0 /100 WBCS — SIGNIFICANT CHANGE UP (ref 0–0)
PHOSPHATE SERPL-MCNC: 4.1 MG/DL — SIGNIFICANT CHANGE UP (ref 2.1–4.9)
PLATELET # BLD AUTO: 203 K/UL — SIGNIFICANT CHANGE UP (ref 130–400)
PLATELET # BLD AUTO: 254 K/UL — SIGNIFICANT CHANGE UP (ref 130–400)
PLATELET # BLD AUTO: 295 K/UL — SIGNIFICANT CHANGE UP (ref 130–400)
PMV BLD: 11.2 FL — HIGH (ref 7.4–10.4)
PMV BLD: 11.3 FL — HIGH (ref 7.4–10.4)
PMV BLD: 11.3 FL — HIGH (ref 7.4–10.4)
POTASSIUM SERPL-MCNC: 4.5 MMOL/L — SIGNIFICANT CHANGE UP (ref 3.5–5)
POTASSIUM SERPL-SCNC: 4.5 MMOL/L — SIGNIFICANT CHANGE UP (ref 3.5–5)
PROT SERPL-MCNC: 4.2 G/DL — LOW (ref 6–8)
PROTHROM AB SERPL-ACNC: 12.2 SEC — SIGNIFICANT CHANGE UP (ref 9.95–12.87)
RBC # BLD: 2.08 M/UL — LOW (ref 4.2–5.4)
RBC # BLD: 2.2 M/UL — LOW (ref 4.2–5.4)
RBC # BLD: 2.94 M/UL — LOW (ref 4.2–5.4)
RBC # FLD: 15.6 % — HIGH (ref 11.5–14.5)
RBC # FLD: 15.7 % — HIGH (ref 11.5–14.5)
RBC # FLD: 16.8 % — HIGH (ref 11.5–14.5)
SODIUM SERPL-SCNC: 143 MMOL/L — SIGNIFICANT CHANGE UP (ref 135–146)
WBC # BLD: 5.73 K/UL — SIGNIFICANT CHANGE UP (ref 4.8–10.8)
WBC # BLD: 6.36 K/UL — SIGNIFICANT CHANGE UP (ref 4.8–10.8)
WBC # BLD: 8.32 K/UL — SIGNIFICANT CHANGE UP (ref 4.8–10.8)
WBC # FLD AUTO: 5.73 K/UL — SIGNIFICANT CHANGE UP (ref 4.8–10.8)
WBC # FLD AUTO: 6.36 K/UL — SIGNIFICANT CHANGE UP (ref 4.8–10.8)
WBC # FLD AUTO: 8.32 K/UL — SIGNIFICANT CHANGE UP (ref 4.8–10.8)

## 2024-11-07 PROCEDURE — 99233 SBSQ HOSP IP/OBS HIGH 50: CPT | Mod: GC

## 2024-11-07 RX ADMIN — Medication 5000 UNIT(S): at 18:07

## 2024-11-07 RX ADMIN — Medication 25 GRAM(S): at 08:30

## 2024-11-07 RX ADMIN — LABETALOL 200 MILLIGRAM(S): 100 TABLET, FILM COATED ORAL at 05:07

## 2024-11-07 RX ADMIN — Medication 4 UNIT(S): at 18:14

## 2024-11-07 RX ADMIN — LEVETIRACETAM 750 MILLIGRAM(S): 1000 TABLET ORAL at 05:06

## 2024-11-07 RX ADMIN — LISINOPRIL 20 MILLIGRAM(S): 20 TABLET ORAL at 05:07

## 2024-11-07 RX ADMIN — LACOSAMIDE 50 MILLIGRAM(S): 10 INJECTION INTRAVENOUS at 18:06

## 2024-11-07 RX ADMIN — Medication 1 TABLET(S): at 21:00

## 2024-11-07 RX ADMIN — Medication 1 PACKET(S): at 18:08

## 2024-11-07 RX ADMIN — CHLORHEXIDINE GLUCONATE 1 APPLICATION(S): 1.2 RINSE ORAL at 05:51

## 2024-11-07 RX ADMIN — LABETALOL 200 MILLIGRAM(S): 100 TABLET, FILM COATED ORAL at 21:00

## 2024-11-07 RX ADMIN — LEVETIRACETAM 750 MILLIGRAM(S): 1000 TABLET ORAL at 18:06

## 2024-11-07 RX ADMIN — Medication 108 GRAM(S): at 05:51

## 2024-11-07 RX ADMIN — Medication 2: at 01:42

## 2024-11-07 RX ADMIN — Medication 1 PACKET(S): at 05:51

## 2024-11-07 RX ADMIN — LACOSAMIDE 50 MILLIGRAM(S): 10 INJECTION INTRAVENOUS at 05:06

## 2024-11-07 RX ADMIN — AMLODIPINE BESYLATE 10 MILLIGRAM(S): 10 TABLET ORAL at 05:07

## 2024-11-07 RX ADMIN — Medication 4 UNIT(S): at 22:14

## 2024-11-07 RX ADMIN — Medication 2: at 05:46

## 2024-11-07 RX ADMIN — Medication 1 TABLET(S): at 05:07

## 2024-11-07 RX ADMIN — Medication 4: at 18:15

## 2024-11-07 RX ADMIN — Medication 5000 UNIT(S): at 05:07

## 2024-11-07 NOTE — PROGRESS NOTE ADULT - ASSESSMENT
79F w/ PMHx of MDS, DM, HTN, CKD, recent admission for SDH s/p crani, evac and MMA embolization, presents with decreased MS, found to have stable SDH, admitted to NSICU for r/o status epilepticus     #SDH  #R/o Status epilepticus  #HTN  #MDS  #CKD  #Fever  r/o CSD   multifactorial encephalopathy    Plan:    Neurological: SDH   s/p ketamine gtt trial to r/o CSD  reconnect to EEG after 24 hour scalp rest  - Neuro Checks Q4 NC  CTH stable on 11/1  - MR Brain- without acuity; stable SDH  -  Keppra 750 gram q 12  - vimpat  50mg q 12   f/u keppra level from 11/4, 11/7  vimpat level from 11/7  - Analgesia/Sedation: Tylenol PRN  - PT/OT  mobility as tolerates   ammonia wnl  obtain B1 and b12 levels     Cardiovascular:HTN   - SBP goal: 110 -  <150  pt was diuresed on 11/3  -  lisinopril 20mg q day , labetalol 200mg q 8; resumed home norvasc   - Echo  TTE Echo Complete w/o Contrast w/ Doppler (10.09.24 @ 12:41) >Normal global left ventricular systolic function. Left ventricular ejection fraction, by visual estimation, is 65 to 70%  - EKG- QTc     Pulmonary:  L NC wean to off  - Wean to FIO2 > 92 %   etco2 goal 35-45, stable  abg stable  - HOB 45  - Aspiration precautions  -  Mouth care per protocol    GI: diarrhea- slight improvement- may be abx-associated (abx completing recently)  - Darren feeding tube for bolus feeds per nutrition   - Banatrol + metamucil  q day   - Dysphagia screen- fail   pt not on PPI or H2RA per outpatient records --> dc GI ppx   no documented EGD noted   rectal tube 11/3  lactobacillus   LBM 11/7  will possibly require PEG; will consult GI vs surgery for tentative PEG next week    /Renal:- AG Met acidosis - MARCO on CKD improving   primafit/bladder scan q 6  s/p lasix on 11/3  - Sodium Goal: 135 - 145  - Replete as needed.  - Mg > 2    Endo: Pre- diabetic   initiate premeal ademalog   - POCT, ISS,  -180  - A1c: 6  - Lipid Profile:   - TSH: 2.63    ID: improved leukocytosis (seizure vs infectious) / UTI   - MRSA nasal neg   ampicillin for enterococcus UTI completed  - Normothermia    Hematology: MDS/ Leucocytosis  anemia to 6.5, transfusing 1 unit today  S/P RBC transfusion - 11/1/24 - HGB stable   Chronic blood transfusions  - SCDs, chemoppx  anemia likely in setting of MDS     MISC:  Stage 2 - buttocks - Zinc 220mg q day + Vit C 250mg q 12;. Wound consult , Rotate q 2 hrs per protocol   PT/OT [x]  CAM-ICU[x]  Family Updated[x]    Lines/Tubes:  PIV: x 2    Code Status:F; f/u with family regarding GOC - they wish for full code and all aggressive measures    Dispo: NSICU

## 2024-11-07 NOTE — PROGRESS NOTE ADULT - SUBJECTIVE AND OBJECTIVE BOX
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 While in NSICU, patient underwent MMA embolization and post operatively was stable, however, on 10/16 she was noted to have leaking from her crani site. STAT CTH showed increase in SDH and extracalvarial collection, NSG removed 25cc of blood and was re-sutured. Patient was then downgraded to the floor, was started on zosyn for UTI, and was discharged to short term rehab on 10/27. Today, patient was more lethargic and presented to the ED for evaluation. CTH showing stable SDH, CTA negative for acute stroke. NSx and NSICU consulted for management.    o/n-  stable overnight; Hb decreased to 6.5, no sequelae of bleeding (known MDS)      REVIEW OF SYSTEMS: [X ] Unable to Assess due to neurologic exam     Neuro: [ ] Headache [ ] Back pain [ ] Numbness [ ] Weakness [ ] Ataxia [ ] Dizziness [ ] Aphasia [ ] Dysarthria [ ] Visual disturbance  Resp: [ ] Shortness of breath/dyspnea, [ ] Orthopnea [ ] Cough  CV: [ ] Chest pain [ ] Palpitation [ ] Lightheadedness [ ] Syncope  Renal: [ ] Thirst [ ] Edema  GI: [ ] Nausea [ ] Emesis [ ] Abdominal pain [ ] Constipation [ ] Diarrhea  Hem: [ ] Hematemesis [ ] bright red blood per rectum  ID: [ ] Fever [ ] Chills [ ] Dysuria  ENT: [ ] Rhinorrhea      Physical Exam: PHYSICAL EXAM:  GENERAL: well built, well nourished  EYES:  PERRLA, conjunctiva and sclera clear, R 3rd and L Vith nerve   NECK: No JVD,   Lungs- Clear yet decreased at bases   HEART: S1S2 normal, no S3, Regular rate and rhythm; No murmurs, rubs, or gallops  ABDOMEN: Soft, Nontender, Nondistended; Bowel sounds present  EXTREMITIES:  2+ Peripheral Pulses, edematous B/UE and LE   SKIN:  admitted  stage 2  buttocks - on admission - scarring over, now improving   NEURO: lethargic, opens eyes to noxious stimuli, groaning, no comprehensible verbal output, midline gaze with R preference, PERRLA, R UPPER  WD, RLE - TF , LLE - TF and LUE - Min withdrawal

## 2024-11-07 NOTE — CHART NOTE - NSCHARTNOTEFT_GEN_A_CORE
As goals are clear and family is open to PEG as needed, palliative care will sign off.    Call back [phone] PRN

## 2024-11-08 LAB
ANION GAP SERPL CALC-SCNC: 8 MMOL/L — SIGNIFICANT CHANGE UP (ref 7–14)
BASOPHILS # BLD AUTO: 0.12 K/UL — SIGNIFICANT CHANGE UP (ref 0–0.2)
BASOPHILS NFR BLD AUTO: 1.7 % — HIGH (ref 0–1)
BUN SERPL-MCNC: 32 MG/DL — HIGH (ref 10–20)
CALCIUM SERPL-MCNC: 8.7 MG/DL — SIGNIFICANT CHANGE UP (ref 8.4–10.5)
CHLORIDE SERPL-SCNC: 112 MMOL/L — HIGH (ref 98–110)
CO2 SERPL-SCNC: 20 MMOL/L — SIGNIFICANT CHANGE UP (ref 17–32)
CREAT SERPL-MCNC: 1 MG/DL — SIGNIFICANT CHANGE UP (ref 0.7–1.5)
EGFR: 58 ML/MIN/1.73M2 — LOW
EOSINOPHIL # BLD AUTO: 0.21 K/UL — SIGNIFICANT CHANGE UP (ref 0–0.7)
EOSINOPHIL NFR BLD AUTO: 3 % — SIGNIFICANT CHANGE UP (ref 0–8)
GLUCOSE BLDC GLUCOMTR-MCNC: 139 MG/DL — HIGH (ref 70–99)
GLUCOSE BLDC GLUCOMTR-MCNC: 155 MG/DL — HIGH (ref 70–99)
GLUCOSE BLDC GLUCOMTR-MCNC: 176 MG/DL — HIGH (ref 70–99)
GLUCOSE BLDC GLUCOMTR-MCNC: 225 MG/DL — HIGH (ref 70–99)
GLUCOSE BLDC GLUCOMTR-MCNC: 231 MG/DL — HIGH (ref 70–99)
GLUCOSE SERPL-MCNC: 132 MG/DL — HIGH (ref 70–99)
HCT VFR BLD CALC: 25.1 % — LOW (ref 37–47)
HGB BLD-MCNC: 8.5 G/DL — LOW (ref 12–16)
IMM GRANULOCYTES NFR BLD AUTO: 0.6 % — HIGH (ref 0.1–0.3)
LYMPHOCYTES # BLD AUTO: 1.17 K/UL — LOW (ref 1.2–3.4)
LYMPHOCYTES # BLD AUTO: 16.6 % — LOW (ref 20.5–51.1)
MAGNESIUM SERPL-MCNC: 2.3 MG/DL — SIGNIFICANT CHANGE UP (ref 1.8–2.4)
MCHC RBC-ENTMCNC: 30.2 PG — SIGNIFICANT CHANGE UP (ref 27–31)
MCHC RBC-ENTMCNC: 33.9 G/DL — SIGNIFICANT CHANGE UP (ref 32–37)
MCV RBC AUTO: 89.3 FL — SIGNIFICANT CHANGE UP (ref 81–99)
MONOCYTES # BLD AUTO: 0.93 K/UL — HIGH (ref 0.1–0.6)
MONOCYTES NFR BLD AUTO: 13.2 % — HIGH (ref 1.7–9.3)
NEUTROPHILS # BLD AUTO: 4.56 K/UL — SIGNIFICANT CHANGE UP (ref 1.4–6.5)
NEUTROPHILS NFR BLD AUTO: 64.9 % — SIGNIFICANT CHANGE UP (ref 42.2–75.2)
NRBC # BLD: 0 /100 WBCS — SIGNIFICANT CHANGE UP (ref 0–0)
PHOSPHATE SERPL-MCNC: 3.6 MG/DL — SIGNIFICANT CHANGE UP (ref 2.1–4.9)
PLATELET # BLD AUTO: 321 K/UL — SIGNIFICANT CHANGE UP (ref 130–400)
PMV BLD: 10.6 FL — HIGH (ref 7.4–10.4)
POTASSIUM SERPL-MCNC: 4.6 MMOL/L — SIGNIFICANT CHANGE UP (ref 3.5–5)
POTASSIUM SERPL-SCNC: 4.6 MMOL/L — SIGNIFICANT CHANGE UP (ref 3.5–5)
RBC # BLD: 2.81 M/UL — LOW (ref 4.2–5.4)
RBC # FLD: 16.5 % — HIGH (ref 11.5–14.5)
SODIUM SERPL-SCNC: 140 MMOL/L — SIGNIFICANT CHANGE UP (ref 135–146)
VIT B12 SERPL-MCNC: 1311 PG/ML — HIGH (ref 232–1245)
WBC # BLD: 7.03 K/UL — SIGNIFICANT CHANGE UP (ref 4.8–10.8)
WBC # FLD AUTO: 7.03 K/UL — SIGNIFICANT CHANGE UP (ref 4.8–10.8)

## 2024-11-08 PROCEDURE — 95720 EEG PHY/QHP EA INCR W/VEEG: CPT

## 2024-11-08 PROCEDURE — 99233 SBSQ HOSP IP/OBS HIGH 50: CPT | Mod: GC

## 2024-11-08 PROCEDURE — 99233 SBSQ HOSP IP/OBS HIGH 50: CPT

## 2024-11-08 RX ORDER — LACOSAMIDE 10 MG/ML
100 INJECTION INTRAVENOUS EVERY 12 HOURS
Refills: 0 | Status: DISCONTINUED | OUTPATIENT
Start: 2024-11-08 | End: 2024-11-15

## 2024-11-08 RX ADMIN — LABETALOL 200 MILLIGRAM(S): 100 TABLET, FILM COATED ORAL at 05:04

## 2024-11-08 RX ADMIN — Medication 500 MILLIGRAM(S): at 12:54

## 2024-11-08 RX ADMIN — Medication 5000 UNIT(S): at 17:44

## 2024-11-08 RX ADMIN — Medication 220 MILLIGRAM(S): at 12:54

## 2024-11-08 RX ADMIN — LACOSAMIDE 100 MILLIGRAM(S): 10 INJECTION INTRAVENOUS at 17:44

## 2024-11-08 RX ADMIN — Medication 1 TABLET(S): at 05:04

## 2024-11-08 RX ADMIN — LABETALOL 10 MILLIGRAM(S): 100 TABLET, FILM COATED ORAL at 15:04

## 2024-11-08 RX ADMIN — Medication 2: at 21:33

## 2024-11-08 RX ADMIN — Medication 1 PACKET(S): at 05:30

## 2024-11-08 RX ADMIN — Medication 1 PACKET(S): at 18:12

## 2024-11-08 RX ADMIN — CHLORHEXIDINE GLUCONATE 1 APPLICATION(S): 1.2 RINSE ORAL at 05:05

## 2024-11-08 RX ADMIN — LACOSAMIDE 50 MILLIGRAM(S): 10 INJECTION INTRAVENOUS at 05:04

## 2024-11-08 RX ADMIN — Medication 1 TABLET(S): at 14:52

## 2024-11-08 RX ADMIN — AMLODIPINE BESYLATE 10 MILLIGRAM(S): 10 TABLET ORAL at 05:05

## 2024-11-08 RX ADMIN — Medication 4 UNIT(S): at 07:21

## 2024-11-08 RX ADMIN — LABETALOL 200 MILLIGRAM(S): 100 TABLET, FILM COATED ORAL at 14:52

## 2024-11-08 RX ADMIN — LISINOPRIL 20 MILLIGRAM(S): 20 TABLET ORAL at 05:30

## 2024-11-08 RX ADMIN — Medication 4 UNIT(S): at 21:33

## 2024-11-08 RX ADMIN — LABETALOL 200 MILLIGRAM(S): 100 TABLET, FILM COATED ORAL at 21:33

## 2024-11-08 RX ADMIN — Medication 4 UNIT(S): at 14:54

## 2024-11-08 RX ADMIN — Medication 4: at 14:54

## 2024-11-08 RX ADMIN — LEVETIRACETAM 750 MILLIGRAM(S): 1000 TABLET ORAL at 05:04

## 2024-11-08 RX ADMIN — LEVETIRACETAM 750 MILLIGRAM(S): 1000 TABLET ORAL at 17:44

## 2024-11-08 RX ADMIN — LABETALOL 10 MILLIGRAM(S): 100 TABLET, FILM COATED ORAL at 21:06

## 2024-11-08 RX ADMIN — Medication 5000 UNIT(S): at 05:04

## 2024-11-08 RX ADMIN — Medication 1 TABLET(S): at 21:33

## 2024-11-08 NOTE — CONSULT NOTE ADULT - ASSESSMENT
79F w/ PMHx of MDS, DM, HTN, CKD, recent admission for SDH s/p crani, evac and MMA embolization, presents with decreased MS, found to have stable SDH, admitted to NSICU for r/o status epilepticus GI was consulted for PEG placement.     AMS   Evaluation for PEG placement   Please recall after completion of neuro workup and speech and swallow evaluation.     79F w/ PMHx of MDS, DM, HTN, CKD, recent admission for SDH s/p crani, evac and MMA embolization, presents with decreased MS, found to have stable SDH, admitted to NSICU for r/o status epilepticus GI was consulted for PEG placement.     AMS   Evaluation for PEG placement   Continues to undergo workup  Please recall after completion of neuro workup and speech and swallow evaluation.

## 2024-11-08 NOTE — EEG REPORT - NS EEG TEXT BOX
Epilepsy Attending Note:     SIMONA KUHN    79y Female  MRN [id number]    Vital Signs Last 24 Hrs  T(C): 36.9 (2024 08:00), Max: 37.2 (2024 00:00)  T(F): 98.5 (2024 08:00), Max: 99 (2024 00:00)  HR: 79 (2024 09:00) (72 - 87)  BP: 143/71 (2024 09:00) (123/55 - 172/72)  BP(mean): 101 (2024 09:00) (79 - 125)  RR: 14 (2024 09:00) (12 - 24)  SpO2: 98% (:) (96% - 99%)    Parameters below as of 2024 09:00  Patient On (Oxygen Delivery Method): room air                              8.5    7.03  )-----------( 321      ( 2024 04:47 )             25.1           140  |  112[H]  |  32[H]  ----------------------------<  132[H]  4.6   |  20  |  1.0    Ca    8.7      2024 04:47  Phos  3.6       Mg     2.3         TPro  4.2[L]  /  Alb  2.8[L]  /  TBili  0.5  /  DBili  x   /  AST  11  /  ALT  15  /  AlkPhos  75  -07      MEDICATIONS  (STANDING):  amLODIPine   Tablet 10 milliGRAM(s) Oral daily  ascorbic acid 500 milliGRAM(s) Oral daily  chlorhexidine 2% Cloths 1 Application(s) Topical <User Schedule>  dextrose 5%. 1000 milliLiter(s) (50 mL/Hr) IV Continuous <Continuous>  dextrose 5%. 1000 milliLiter(s) (100 mL/Hr) IV Continuous <Continuous>  dextrose 50% Injectable 12.5 Gram(s) IV Push once  dextrose 50% Injectable 25 Gram(s) IV Push once  dextrose 50% Injectable 25 Gram(s) IV Push once  glucagon  Injectable 1 milliGRAM(s) IntraMuscular once  heparin   Injectable 5000 Unit(s) SubCutaneous every 12 hours  insulin lispro (ADMELOG) corrective regimen sliding scale   SubCutaneous every 6 hours  insulin lispro Injectable (ADMELOG) 4 Unit(s) SubCutaneous four times a day with meals  labetalol 200 milliGRAM(s) Oral every 8 hours  lacosamide Solution 50 milliGRAM(s) Oral every 12 hours  lactobacillus acidophilus 1 Tablet(s) Oral every 8 hours  levETIRAcetam  Solution 750 milliGRAM(s) Enteral Tube every 12 hours  lisinopril 20 milliGRAM(s) Oral daily  magnesium sulfate  IVPB 2 Gram(s) IV Intermittent once  niCARdipine Infusion 5 mG/Hr (25 mL/Hr) IV Continuous <Continuous>  psyllium Powder 1 Packet(s) Oral every 12 hours  zinc sulfate 220 milliGRAM(s) Oral daily    MEDICATIONS  (PRN):  dextrose Oral Gel 15 Gram(s) Oral once PRN Blood Glucose LESS THAN 70 milliGRAM(s)/deciliter  labetalol Injectable 10 milliGRAM(s) IV Push every 2 hours PRN Systolic blood pressure > 140            VEEG in the last 24 hours:    Background - continuous, symmetrica, less than optimally organized, reaching frequencies in the range of 5-6 Hz superimposed by abundant number of diffusely expressed triphasic morphology discharges that appear epileptiform in nature, more aspecifically from the right hemisphere and right C-P region. The recording also shows variable stretches of background that normalized and shows a better defined PDR>    Focal and generalized slowin. moderate to severe generalized slowing  2. moderate right hemispheric focal slowing    Interictal activity - abundant periodic discharges as described above    Events - none    Seizures - none    Impression: Abnormal VEEG as above    Plan - per NCC team

## 2024-11-08 NOTE — CONSULT NOTE ADULT - SUBJECTIVE AND OBJECTIVE BOX
Gastroenterology Consultation:    Patient is a 79y old  Female who presents with a chief complaint of SDH, Seizures (08 Nov 2024 12:08)  Admitted on: 10-31-24      HPI:  79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 While in NSICU, patient underwent MMA embolization and post operatively was stable, however, on 10/16 she was noted to have leaking from her crani site. STAT CTH showed increase in SDH and extracalvarial collection, NSG removed 25cc of blood and was re-sutured. Patient was then downgraded to the floor, was started on zosyn for UTI, and was discharged to short term rehab on 10/27. Today, patient was more lethargic and presented to the ED for evaluation. CTH showing stable SDH. Admitted to NICU and GI was consulted for PEG tube placement.     Prior EGD/ Colonoscopy:  no records available     PAST MEDICAL & SURGICAL HISTORY:  DM (diabetes mellitus)      MDS (myelodysplastic syndrome)      H/O colonoscopy            FAMILY HISTORY:  no fhx of gi cancers     Social History:  no substance use   Home Medications:  amLODIPine 10 mg oral tablet: 1 tab(s) orally once a day (31 Oct 2024 16:24)  glimepiride 2 mg oral tablet: 1 tab(s) orally 2 times a day (31 Oct 2024 16:24)  Jardiance 10 mg oral tablet: 1 tab(s) orally once a day (31 Oct 2024 16:24)  labetalol 200 mg oral tablet: 1 tab(s) orally every 8 hours (31 Oct 2024 16:24)  levETIRAcetam 500 mg oral tablet: 1 tab(s) orally 2 times a day (31 Oct 2024 16:24)  lisinopril 10 mg oral tablet: 1 tab(s) orally once a day (31 Oct 2024 16:24)  pantoprazole 40 mg oral delayed release tablet: 1 tab(s) orally once a day (before a meal) (31 Oct 2024 16:24)  pioglitazone 30 mg oral tablet: 1 tab(s) orally once a day (31 Oct 2024 16:24)  polyethylene glycol 3350 oral powder for reconstitution: 17 gram(s) orally once a day (31 Oct 2024 16:24)  pyridoxine 100 mg oral tablet: 1 tab(s) orally once a day (31 Oct 2024 16:24)  senna leaf extract oral tablet: 2 tab(s) orally once a day (at bedtime) (31 Oct 2024 16:24)  SPS 15 GM/60 ML SUSPENSION: TAKE 15ML TWICE WEEKLY (31 Oct 2024 16:24)  Tradjenta 5 mg oral tablet: 1 tab(s) orally once a day (31 Oct 2024 16:24)        MEDICATIONS  (STANDING):  amLODIPine   Tablet 10 milliGRAM(s) Oral daily  ascorbic acid 500 milliGRAM(s) Oral daily  chlorhexidine 2% Cloths 1 Application(s) Topical <User Schedule>  dextrose 5%. 1000 milliLiter(s) (100 mL/Hr) IV Continuous <Continuous>  dextrose 5%. 1000 milliLiter(s) (50 mL/Hr) IV Continuous <Continuous>  dextrose 50% Injectable 25 Gram(s) IV Push once  dextrose 50% Injectable 12.5 Gram(s) IV Push once  dextrose 50% Injectable 25 Gram(s) IV Push once  glucagon  Injectable 1 milliGRAM(s) IntraMuscular once  heparin   Injectable 5000 Unit(s) SubCutaneous every 12 hours  insulin lispro (ADMELOG) corrective regimen sliding scale   SubCutaneous every 8 hours  insulin lispro Injectable (ADMELOG) 4 Unit(s) SubCutaneous every 8 hours  labetalol 200 milliGRAM(s) Oral every 8 hours  lacosamide Solution 100 milliGRAM(s) Oral every 12 hours  lactobacillus acidophilus 1 Tablet(s) Oral every 8 hours  levETIRAcetam  Solution 750 milliGRAM(s) Enteral Tube every 12 hours  lisinopril 20 milliGRAM(s) Oral daily  magnesium sulfate  IVPB 2 Gram(s) IV Intermittent once  psyllium Powder 1 Packet(s) Oral every 12 hours  zinc sulfate 220 milliGRAM(s) Oral daily    MEDICATIONS  (PRN):  dextrose Oral Gel 15 Gram(s) Oral once PRN Blood Glucose LESS THAN 70 milliGRAM(s)/deciliter  labetalol Injectable 10 milliGRAM(s) IV Push every 2 hours PRN Systolic blood pressure > 140      Allergies  No Known Allergies      Review of Systems:   unable to obtain       Physical Examination:  T(C): 36.6 (11-08-24 @ 16:00), Max: 37.2 (11-08-24 @ 00:00)  HR: 77 (11-08-24 @ 16:00) (72 - 101)  BP: 146/65 (11-08-24 @ 16:00) (131/70 - 172/72)  RR: 16 (11-08-24 @ 16:00) (12 - 24)  SpO2: 98% (11-08-24 @ 16:00) (95% - 99%)      11-06-24 @ 07:01  -  11-07-24 @ 07:00  --------------------------------------------------------  IN: 1347.5 mL / OUT: 405 mL / NET: 942.5 mL    11-07-24 @ 07:01  -  11-08-24 @ 07:00  --------------------------------------------------------  IN: 1534 mL / OUT: 1105 mL / NET: 429 mL    11-08-24 @ 07:01  -  11-08-24 @ 17:43  --------------------------------------------------------  IN: 490 mL / OUT: 600 mL / NET: -110 mL          GENERAL: VEEG   HEAD:  Atraumatic, Normocephalic  EYES: conjunctiva and sclera clear  NECK: Supple, no JVD or thyromegaly  CHEST/LUNG: Clear to auscultation bilaterally  HEART: Regular rate and rhythm; normal S1, S2, No murmurs.  ABDOMEN: Soft, nontender, nondistended  NEUROLOGY: unable to evaluate   SKIN: Intact, no jaundice        Data:                        8.5    7.03  )-----------( 321      ( 08 Nov 2024 04:47 )             25.1     Hgb Trend:  8.5  11-08-24 @ 04:47  8.8  11-07-24 @ 20:46  6.3  11-07-24 @ 06:48  6.5  11-07-24 @ 04:38  7.5  11-06-24 @ 04:55      11-07-24 @ 07:01  -  11-08-24 @ 07:00  --------------------------------------------------------  IN: 314 mL      11-08    140  |  112[H]  |  32[H]  ----------------------------<  132[H]  4.6   |  20  |  1.0    Ca    8.7      08 Nov 2024 04:47  Phos  3.6     11-08  Mg     2.3     11-08    TPro  4.2[L]  /  Alb  2.8[L]  /  TBili  0.5  /  DBili  x   /  AST  11  /  ALT  15  /  AlkPhos  75  11-07    Liver panel trend:  TBili 0.5   /   AST 11   /   ALT 15   /   AlkP 75   /   Tptn 4.2   /   Alb 2.8    /   DBili --      11-07  TBili 0.4   /   AST 12   /   ALT 21   /   AlkP 93   /   Tptn 4.6   /   Alb 3.1    /   DBili --      11-06  TBili 0.5   /   AST 19   /   ALT 17   /   AlkP 72   /   Tptn 3.7   /   Alb 2.6    /   DBili --      11-05  TBili 0.7   /   AST 18   /   ALT 21   /   AlkP 77   /   Tptn 4.5   /   Alb 3.3    /   DBili --      11-03  TBili 1.2   /   AST 20   /   ALT 25   /   AlkP 84   /   Tptn 4.9   /   Alb 3.5    /   DBili --      11-02  TBili 0.8   /   AST 23   /   ALT 31   /   AlkP 86   /   Tptn 4.8   /   Alb 3.5    /   DBili --      11-01  TBili 0.7   /   AST 33   /   ALT 32   /   AlkP 88   /   Tptn 4.8   /   Alb 3.5    /   DBili --      10-31      PT/INR - ( 07 Nov 2024 06:48 )   PT: 12.20 sec;   INR: 1.03 ratio         PTT - ( 07 Nov 2024 06:48 )  PTT:29.2 sec        Radiology:

## 2024-11-08 NOTE — PROGRESS NOTE ADULT - SUBJECTIVE AND OBJECTIVE BOX
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 While in NSICU, patient underwent MMA embolization and post operatively was stable, however, on 10/16 she was noted to have leaking from her crani site. STAT CTH showed increase in SDH and extracalvarial collection, NSG removed 25cc of blood and was re-sutured. Patient was then downgraded to the floor, was started on zosyn for UTI, and was discharged to short term rehab on 10/27. Today, patient was more lethargic and presented to the ED for evaluation. CTH showing stable SDH, CTA negative for acute stroke. NSx and NSICU consulted for management.    o/n-  stable overnight      REVIEW OF SYSTEMS: [X ] Unable to Assess due to neurologic exam     Neuro: [ ] Headache [ ] Back pain [ ] Numbness [ ] Weakness [ ] Ataxia [ ] Dizziness [ ] Aphasia [ ] Dysarthria [ ] Visual disturbance  Resp: [ ] Shortness of breath/dyspnea, [ ] Orthopnea [ ] Cough  CV: [ ] Chest pain [ ] Palpitation [ ] Lightheadedness [ ] Syncope  Renal: [ ] Thirst [ ] Edema  GI: [ ] Nausea [ ] Emesis [ ] Abdominal pain [ ] Constipation [ ] Diarrhea  Hem: [ ] Hematemesis [ ] bright red blood per rectum  ID: [ ] Fever [ ] Chills [ ] Dysuria  ENT: [ ] Rhinorrhea      Physical Exam: PHYSICAL EXAM:  GENERAL: well built, well nourished  EYES:  PERRLA, conjunctiva and sclera clear, R 3rd and L Vith nerve   NECK: No JVD,   Lungs- Clear yet decreased at bases   HEART: S1S2 normal, no S3, Regular rate and rhythm; No murmurs, rubs, or gallops  ABDOMEN: Soft, Nontender, Nondistended; Bowel sounds present  EXTREMITIES:  2+ Peripheral Pulses, edematous B/UE and LE   SKIN:  admitted  stage 2  buttocks - on admission - scarring over, now improving   NEURO: lethargic, opens eyes to noxious stimuli, groaning, no comprehensible verbal output, midline gaze with R preference, PERRLA, R UPPER  WD, RLE - TF , LLE - TF and LUE - Min withdrawal

## 2024-11-08 NOTE — PROGRESS NOTE ADULT - ASSESSMENT
79F w/ PMHx of MDS, DM, HTN, CKD, recent admission for SDH s/p crani, evac and MMA embolization, presents with decreased MS, found to have stable SDH, admitted to NSICU for r/o status epilepticus     #SDH  #R/o Status epilepticus  #HTN  #MDS  #CKD  #Fever  r/o CSD   multifactorial encephalopathy    Plan:    Neurological: SDH   s/p ketamine gtt trial to r/o CSD  EEG - notable interictal activity   - Neuro Checks Q4 NC  CTH stable on 11/1  - MR Brain- without acuity; stable SDH  -  Keppra 750 gram q 12  f/u keppra level from today (with improved CrCl)- so will maintain same dose   11/4 keppra level was 98 (with Cr 1.5)-- now it is   vimpat level 4.02-- increase vimpat to 100 mg q12  - Analgesia/Sedation: Tylenol PRN  - PT/OT  mobility as tolerates   ammonia wnl  obtain B1 and b12 levels     Cardiovascular:HTN   - SBP goal: 110 -  <150  pt was diuresed on 11/3  -  lisinopril 20mg q day , labetalol 200mg q 8; resumed home norvasc   - Echo  TTE Echo Complete w/o Contrast w/ Doppler (10.09.24 @ 12:41) >Normal global left ventricular systolic function. Left ventricular ejection fraction, by visual estimation, is 65 to 70%  - EKG- QTc     Pulmonary:  L NC wean to off  - Wean to FIO2 > 92 %   - HOB 45  - Aspiration precautions  -  Mouth care per protocol    GI: diarrhea- slight improvement- may be abx-associated (abx completing recently)  - Darren feeding tube for bolus feeds per nutrition   - Banatrol + metamucil  q day   - Dysphagia screen- fail   pt not on PPI or H2RA per outpatient records --> dc GI ppx   no documented EGD noted   rectal tube 11/3  lactobacillus   LBM 11/8  will possibly require PEG; will consult GI vs surgery for tentative PEG next week    /Renal:- AG Met acidosis - MARCO on CKD improving  primafit/bladder scan q 6  s/p lasix on 11/3  - Sodium Goal: 135 - 145  - Replete as needed.  - Mg > 2    Endo: Pre- diabetic   initiate premeal ademalog   - POCT, ISS,  -180  - A1c: 6  - Lipid Profile:   - TSH: 2.63    ID: s/p UTI  - MRSA nasal neg   s/p ampicillin for enterococcus UTI completed  - Normothermia    Hematology: MDS/ Leucocytosis  anemia to 6.5, transfusing 1 unit recently with improved Hb to 8.8 now  S/P RBC transfusion - 11/1/24 - HGB stable   Chronic blood transfusions  - SCDs, chemoppx  anemia likely in setting of MDS     MISC:  Stage 2 - buttocks - Zinc 220mg q day + Vit C 250mg q 12;. Wound consult , Rotate q 2 hrs per protocol   PT/OT [x]  CAM-ICU[x]  Family Updated[x]    Lines/Tubes:  PIV: x 2    Code Status:F; f/u with family regarding GOC - they wish for full code and all aggressive measures    Dispo: NSICU

## 2024-11-09 LAB
ALBUMIN SERPL ELPH-MCNC: 3 G/DL — LOW (ref 3.5–5.2)
ALP SERPL-CCNC: 78 U/L — SIGNIFICANT CHANGE UP (ref 30–115)
ALT FLD-CCNC: 14 U/L — SIGNIFICANT CHANGE UP (ref 0–41)
ANION GAP SERPL CALC-SCNC: 8 MMOL/L — SIGNIFICANT CHANGE UP (ref 7–14)
AST SERPL-CCNC: 13 U/L — SIGNIFICANT CHANGE UP (ref 0–41)
BASOPHILS # BLD AUTO: 0.08 K/UL — SIGNIFICANT CHANGE UP (ref 0–0.2)
BASOPHILS NFR BLD AUTO: 1.1 % — HIGH (ref 0–1)
BILIRUB SERPL-MCNC: 0.4 MG/DL — SIGNIFICANT CHANGE UP (ref 0.2–1.2)
BUN SERPL-MCNC: 31 MG/DL — HIGH (ref 10–20)
CALCIUM SERPL-MCNC: 8.9 MG/DL — SIGNIFICANT CHANGE UP (ref 8.4–10.5)
CHLORIDE SERPL-SCNC: 113 MMOL/L — HIGH (ref 98–110)
CK SERPL-CCNC: 13 U/L — SIGNIFICANT CHANGE UP (ref 0–225)
CO2 SERPL-SCNC: 23 MMOL/L — SIGNIFICANT CHANGE UP (ref 17–32)
CREAT SERPL-MCNC: 0.9 MG/DL — SIGNIFICANT CHANGE UP (ref 0.7–1.5)
EGFR: 65 ML/MIN/1.73M2 — SIGNIFICANT CHANGE UP
EOSINOPHIL # BLD AUTO: 0.32 K/UL — SIGNIFICANT CHANGE UP (ref 0–0.7)
EOSINOPHIL NFR BLD AUTO: 4.4 % — SIGNIFICANT CHANGE UP (ref 0–8)
GLUCOSE BLDC GLUCOMTR-MCNC: 134 MG/DL — HIGH (ref 70–99)
GLUCOSE BLDC GLUCOMTR-MCNC: 146 MG/DL — HIGH (ref 70–99)
GLUCOSE BLDC GLUCOMTR-MCNC: 177 MG/DL — HIGH (ref 70–99)
GLUCOSE SERPL-MCNC: 166 MG/DL — HIGH (ref 70–99)
HCT VFR BLD CALC: 23.9 % — LOW (ref 37–47)
HGB BLD-MCNC: 7.8 G/DL — LOW (ref 12–16)
IMM GRANULOCYTES NFR BLD AUTO: 0.5 % — HIGH (ref 0.1–0.3)
LYMPHOCYTES # BLD AUTO: 1.16 K/UL — LOW (ref 1.2–3.4)
LYMPHOCYTES # BLD AUTO: 15.8 % — LOW (ref 20.5–51.1)
MAGNESIUM SERPL-MCNC: 2.1 MG/DL — SIGNIFICANT CHANGE UP (ref 1.8–2.4)
MCHC RBC-ENTMCNC: 29.8 PG — SIGNIFICANT CHANGE UP (ref 27–31)
MCHC RBC-ENTMCNC: 32.6 G/DL — SIGNIFICANT CHANGE UP (ref 32–37)
MCV RBC AUTO: 91.2 FL — SIGNIFICANT CHANGE UP (ref 81–99)
MONOCYTES # BLD AUTO: 0.99 K/UL — HIGH (ref 0.1–0.6)
MONOCYTES NFR BLD AUTO: 13.5 % — HIGH (ref 1.7–9.3)
NEUTROPHILS # BLD AUTO: 4.74 K/UL — SIGNIFICANT CHANGE UP (ref 1.4–6.5)
NEUTROPHILS NFR BLD AUTO: 64.7 % — SIGNIFICANT CHANGE UP (ref 42.2–75.2)
NRBC # BLD: 0 /100 WBCS — SIGNIFICANT CHANGE UP (ref 0–0)
PHOSPHATE SERPL-MCNC: 3.4 MG/DL — SIGNIFICANT CHANGE UP (ref 2.1–4.9)
PLATELET # BLD AUTO: 381 K/UL — SIGNIFICANT CHANGE UP (ref 130–400)
PMV BLD: 10.5 FL — HIGH (ref 7.4–10.4)
POTASSIUM SERPL-MCNC: 4.7 MMOL/L — SIGNIFICANT CHANGE UP (ref 3.5–5)
POTASSIUM SERPL-SCNC: 4.7 MMOL/L — SIGNIFICANT CHANGE UP (ref 3.5–5)
PROT SERPL-MCNC: 4.5 G/DL — LOW (ref 6–8)
RBC # BLD: 2.62 M/UL — LOW (ref 4.2–5.4)
RBC # FLD: 16 % — HIGH (ref 11.5–14.5)
SODIUM SERPL-SCNC: 144 MMOL/L — SIGNIFICANT CHANGE UP (ref 135–146)
WBC # BLD: 7.33 K/UL — SIGNIFICANT CHANGE UP (ref 4.8–10.8)
WBC # FLD AUTO: 7.33 K/UL — SIGNIFICANT CHANGE UP (ref 4.8–10.8)

## 2024-11-09 PROCEDURE — 93971 EXTREMITY STUDY: CPT | Mod: 26,LT

## 2024-11-09 PROCEDURE — 95720 EEG PHY/QHP EA INCR W/VEEG: CPT

## 2024-11-09 PROCEDURE — 99233 SBSQ HOSP IP/OBS HIGH 50: CPT | Mod: GC

## 2024-11-09 RX ORDER — AMANTADINE HCL 100 MG
50 CAPSULE ORAL DAILY
Refills: 0 | Status: DISCONTINUED | OUTPATIENT
Start: 2024-11-10 | End: 2024-11-11

## 2024-11-09 RX ORDER — HYDRALAZINE HYDROCHLORIDE 10 MG/1
5 TABLET ORAL
Refills: 0 | Status: DISCONTINUED | OUTPATIENT
Start: 2024-11-09 | End: 2024-11-13

## 2024-11-09 RX ORDER — AMANTADINE HCL 100 MG
100 CAPSULE ORAL ONCE
Refills: 0 | Status: COMPLETED | OUTPATIENT
Start: 2024-11-09 | End: 2024-11-09

## 2024-11-09 RX ORDER — SACCHAROMYCES BOULARDII 250 MG
250 CAPSULE ORAL
Refills: 0 | Status: DISCONTINUED | OUTPATIENT
Start: 2024-11-10 | End: 2024-11-20

## 2024-11-09 RX ORDER — LABETALOL 100 MG/1
10 TABLET, FILM COATED ORAL
Refills: 0 | Status: DISCONTINUED | OUTPATIENT
Start: 2024-11-09 | End: 2024-11-13

## 2024-11-09 RX ADMIN — CHLORHEXIDINE GLUCONATE 1 APPLICATION(S): 1.2 RINSE ORAL at 05:24

## 2024-11-09 RX ADMIN — LACOSAMIDE 100 MILLIGRAM(S): 10 INJECTION INTRAVENOUS at 18:48

## 2024-11-09 RX ADMIN — Medication 500 MILLIGRAM(S): at 12:44

## 2024-11-09 RX ADMIN — Medication 4 UNIT(S): at 21:15

## 2024-11-09 RX ADMIN — AMLODIPINE BESYLATE 10 MILLIGRAM(S): 10 TABLET ORAL at 05:00

## 2024-11-09 RX ADMIN — Medication 2: at 21:15

## 2024-11-09 RX ADMIN — LISINOPRIL 20 MILLIGRAM(S): 20 TABLET ORAL at 05:00

## 2024-11-09 RX ADMIN — Medication 100 MILLIGRAM(S): at 12:43

## 2024-11-09 RX ADMIN — Medication 1 PACKET(S): at 05:01

## 2024-11-09 RX ADMIN — Medication 5000 UNIT(S): at 18:48

## 2024-11-09 RX ADMIN — Medication 1 TABLET(S): at 05:01

## 2024-11-09 RX ADMIN — LABETALOL 200 MILLIGRAM(S): 100 TABLET, FILM COATED ORAL at 05:01

## 2024-11-09 RX ADMIN — LABETALOL 200 MILLIGRAM(S): 100 TABLET, FILM COATED ORAL at 22:16

## 2024-11-09 RX ADMIN — Medication 1 PACKET(S): at 18:48

## 2024-11-09 RX ADMIN — Medication 4 UNIT(S): at 13:07

## 2024-11-09 RX ADMIN — LABETALOL 200 MILLIGRAM(S): 100 TABLET, FILM COATED ORAL at 13:08

## 2024-11-09 RX ADMIN — Medication 4 UNIT(S): at 05:11

## 2024-11-09 RX ADMIN — Medication 5000 UNIT(S): at 05:00

## 2024-11-09 RX ADMIN — LEVETIRACETAM 750 MILLIGRAM(S): 1000 TABLET ORAL at 18:49

## 2024-11-09 RX ADMIN — LABETALOL 10 MILLIGRAM(S): 100 TABLET, FILM COATED ORAL at 04:03

## 2024-11-09 RX ADMIN — LACOSAMIDE 100 MILLIGRAM(S): 10 INJECTION INTRAVENOUS at 05:00

## 2024-11-09 RX ADMIN — LEVETIRACETAM 750 MILLIGRAM(S): 1000 TABLET ORAL at 05:00

## 2024-11-09 RX ADMIN — Medication 220 MILLIGRAM(S): at 12:43

## 2024-11-09 NOTE — PROGRESS NOTE ADULT - SUBJECTIVE AND OBJECTIVE BOX
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 While in NSICU, patient underwent MMA embolization and post operatively was stable, however, on 10/16 she was noted to have leaking from her crani site. STAT CTH showed increase in SDH and extracalvarial collection, NSG removed 25cc of blood and was re-sutured. Patient was then downgraded to the floor, was started on zosyn for UTI, and was discharged to short term rehab on 10/27. Today, patient was more lethargic and presented to the ED for evaluation. CTH showing stable SDH, CTA negative for acute stroke. NSx and NSICU consulted for management.    o/n-  stable overnight      REVIEW OF SYSTEMS: [X ] Unable to Assess due to neurologic exam     Neuro: [ ] Headache [ ] Back pain [ ] Numbness [ ] Weakness [ ] Ataxia [ ] Dizziness [ ] Aphasia [ ] Dysarthria [ ] Visual disturbance  Resp: [ ] Shortness of breath/dyspnea, [ ] Orthopnea [ ] Cough  CV: [ ] Chest pain [ ] Palpitation [ ] Lightheadedness [ ] Syncope  Renal: [ ] Thirst [ ] Edema  GI: [ ] Nausea [ ] Emesis [ ] Abdominal pain [ ] Constipation [ ] Diarrhea  Hem: [ ] Hematemesis [ ] bright red blood per rectum  ID: [ ] Fever [ ] Chills [ ] Dysuria  ENT: [ ] Rhinorrhea      Physical Exam: PHYSICAL EXAM:  GENERAL: well built, well nourished  EYES:  PERRLA, conjunctiva and sclera clear, R 3rd and L Vith nerve   NECK: No JVD,   Lungs- Clear yet decreased at bases   HEART: S1S2 normal, no S3, Regular rate and rhythm; No murmurs, rubs, or gallops  ABDOMEN: Soft, Nontender, Nondistended; Bowel sounds present  EXTREMITIES:  2+ Peripheral Pulses, edematous B/UE and LE ; LUE with induration firm, erythematous edema, neurovascular checks intact otherwsise  SKIN:  admitted  stage 2  buttocks - on admission - scarring over, now improving   NEURO: lethargic, opens eyes to noxious stimuli, groaning, no comprehensible verbal output, midline gaze with R preference, PERRLA, R UPPER  WD, RLE - TF , LLE - TF and LUE - Min withdrawal

## 2024-11-09 NOTE — PROGRESS NOTE ADULT - ASSESSMENT
79F w/ PMHx of MDS, DM, HTN, CKD, recent admission for SDH s/p crani, evac and MMA embolization, presents with decreased MS, found to have stable SDH, admitted to NSICU for r/o status epilepticus     #SDH  #R/o Status epilepticus  #HTN  #MDS  #CKD  #Fever  r/o CSD   multifactorial encephalopathy    Plan:    Neurological: SDH   s/p ketamine gtt trial to r/o CSD  EEG - triphasics  - Neuro Checks Q4 NC  CTH stable on 11/1  - MR Brain- without acuity; stable SDH  -  Keppra 750 gram q 12  f/u keppra level from today (with improved CrCl)- so will maintain same dose   11/4 keppra level was 98 (with Cr 1.5)-   vimpat level 4.02-- increased vimpat to 100 mg q12 on 11/8  EEG negative for seizure at this time, will initiate neurostimulant with amantidine and monitor on EEG  - Analgesia/Sedation: Tylenol PRN  - PT/OT  mobility as tolerates   ammonia wnl  obtain B1 and b12 levels testing    Cardiovascular:HTN   - SBP goal: 110 -  <150  pt was diuresed on 11/3  -  lisinopril 20mg q day , labetalol 200mg q 8; resumed home norvasc   prn labetalol/hydralazine   - Echo  TTE Echo Complete w/o Contrast w/ Doppler (10.09.24 @ 12:41) >Normal global left ventricular systolic function. Left ventricular ejection fraction, by visual estimation, is 65 to 70%  - EKG- QTc     Pulmonary:  L NC wean to off  - Wean to FIO2 > 92 %   - HOB 45  - Aspiration precautions  -  Mouth care per protocol    GI: diarrhea- - may be abx-associated (abx completing recently) vs feed related  - Darren feeding tube for bolus feeds per nutrition- f/u recs  - Banatrol + metamucil  q day   - Dysphagia screen- fail   pt not on PPI or H2RA per outpatient records --> off GI ppx   no documented EGD noted   rectal tube 11/3  lactobacillus --> changed to florastor as per nutrition  LBM 11/9  will possibly require PEG; will consult GI   obtain stool studies for diarrhea    /Renal:- AG Met acidosis - MARCO on CKD improving  primafit/bladder scan q 6  - Sodium Goal: 135 - 145  - Replete as needed.  - Mg > 2    Endo: Pre- diabetic   initiate premeal ademalog   - POCT, ISS,  -180  - A1c: 6  - Lipid Profile:   - TSH: 2.63    ID: s/p UTI  - MRSA nasal neg   s/p ampicillin for enterococcus UTI completed  - Normothermia    Hematology: MDS/ Leucocytosis  anemia to 6.5, transfusing 1 unit on 11/7 with improved Hb to 8.8--> now  7.8  Chronic blood transfusions  - SCDs, chemoppx  anemia likely in setting of MDS   LUE venous doppler for swollen/indurated L forearm  neurovascular checks of LUE q4  cpk normal    MISC:  Stage 2 - buttocks - Zinc 220mg q day + Vit C 250mg q 12;. Wound consult , Rotate q 2 hrs per protocol   PT/OT [x]  CAM-ICU[x]  Family Updated[x]    Lines/Tubes:  PIV: x 2    Code Status:F; f/u with family regarding GOC - they wish for full code and all aggressive measures    Dispo: stable for medical sdu

## 2024-11-10 LAB
ALBUMIN SERPL ELPH-MCNC: 2.8 G/DL — LOW (ref 3.5–5.2)
ALP SERPL-CCNC: 79 U/L — SIGNIFICANT CHANGE UP (ref 30–115)
ALT FLD-CCNC: 14 U/L — SIGNIFICANT CHANGE UP (ref 0–41)
ANION GAP SERPL CALC-SCNC: 6 MMOL/L — LOW (ref 7–14)
ANION GAP SERPL CALC-SCNC: 8 MMOL/L — SIGNIFICANT CHANGE UP (ref 7–14)
ANION GAP SERPL CALC-SCNC: 8 MMOL/L — SIGNIFICANT CHANGE UP (ref 7–14)
AST SERPL-CCNC: 21 U/L — SIGNIFICANT CHANGE UP (ref 0–41)
BASOPHILS # BLD AUTO: 0.07 K/UL — SIGNIFICANT CHANGE UP (ref 0–0.2)
BASOPHILS # BLD AUTO: 0.07 K/UL — SIGNIFICANT CHANGE UP (ref 0–0.2)
BASOPHILS NFR BLD AUTO: 1.2 % — HIGH (ref 0–1)
BASOPHILS NFR BLD AUTO: 1.3 % — HIGH (ref 0–1)
BILIRUB SERPL-MCNC: 0.4 MG/DL — SIGNIFICANT CHANGE UP (ref 0.2–1.2)
BUN SERPL-MCNC: 28 MG/DL — HIGH (ref 10–20)
BUN SERPL-MCNC: 29 MG/DL — HIGH (ref 10–20)
BUN SERPL-MCNC: 30 MG/DL — HIGH (ref 10–20)
CALCIUM SERPL-MCNC: 8.7 MG/DL — SIGNIFICANT CHANGE UP (ref 8.4–10.4)
CALCIUM SERPL-MCNC: 8.9 MG/DL — SIGNIFICANT CHANGE UP (ref 8.4–10.5)
CALCIUM SERPL-MCNC: 9.1 MG/DL — SIGNIFICANT CHANGE UP (ref 8.4–10.5)
CHLORIDE SERPL-SCNC: 111 MMOL/L — HIGH (ref 98–110)
CHLORIDE SERPL-SCNC: 112 MMOL/L — HIGH (ref 98–110)
CHLORIDE SERPL-SCNC: 113 MMOL/L — HIGH (ref 98–110)
CO2 SERPL-SCNC: 20 MMOL/L — SIGNIFICANT CHANGE UP (ref 17–32)
CO2 SERPL-SCNC: 23 MMOL/L — SIGNIFICANT CHANGE UP (ref 17–32)
CO2 SERPL-SCNC: 23 MMOL/L — SIGNIFICANT CHANGE UP (ref 17–32)
CREAT SERPL-MCNC: 0.9 MG/DL — SIGNIFICANT CHANGE UP (ref 0.7–1.5)
EGFR: 65 ML/MIN/1.73M2 — SIGNIFICANT CHANGE UP
EOSINOPHIL # BLD AUTO: 0.25 K/UL — SIGNIFICANT CHANGE UP (ref 0–0.7)
EOSINOPHIL # BLD AUTO: 0.26 K/UL — SIGNIFICANT CHANGE UP (ref 0–0.7)
EOSINOPHIL NFR BLD AUTO: 4.4 % — SIGNIFICANT CHANGE UP (ref 0–8)
EOSINOPHIL NFR BLD AUTO: 4.8 % — SIGNIFICANT CHANGE UP (ref 0–8)
GLUCOSE BLDC GLUCOMTR-MCNC: 137 MG/DL — HIGH (ref 70–99)
GLUCOSE BLDC GLUCOMTR-MCNC: 144 MG/DL — HIGH (ref 70–99)
GLUCOSE BLDC GLUCOMTR-MCNC: 157 MG/DL — HIGH (ref 70–99)
GLUCOSE SERPL-MCNC: 133 MG/DL — HIGH (ref 70–99)
GLUCOSE SERPL-MCNC: 162 MG/DL — HIGH (ref 70–99)
GLUCOSE SERPL-MCNC: 194 MG/DL — HIGH (ref 70–99)
HCT VFR BLD CALC: 21.6 % — LOW (ref 37–47)
HCT VFR BLD CALC: 23.4 % — LOW (ref 37–47)
HGB BLD-MCNC: 7.1 G/DL — LOW (ref 12–16)
HGB BLD-MCNC: 7.5 G/DL — LOW (ref 12–16)
IMM GRANULOCYTES NFR BLD AUTO: 0.5 % — HIGH (ref 0.1–0.3)
IMM GRANULOCYTES NFR BLD AUTO: 0.7 % — HIGH (ref 0.1–0.3)
LACOSAMIDE (VIMPAT) RESULT: 4.11 UG/ML — SIGNIFICANT CHANGE UP (ref 1–10)
LYMPHOCYTES # BLD AUTO: 0.89 K/UL — LOW (ref 1.2–3.4)
LYMPHOCYTES # BLD AUTO: 1.14 K/UL — LOW (ref 1.2–3.4)
LYMPHOCYTES # BLD AUTO: 15.8 % — LOW (ref 20.5–51.1)
LYMPHOCYTES # BLD AUTO: 21.2 % — SIGNIFICANT CHANGE UP (ref 20.5–51.1)
MAGNESIUM SERPL-MCNC: 2 MG/DL — SIGNIFICANT CHANGE UP (ref 1.8–2.4)
MCHC RBC-ENTMCNC: 29.9 PG — SIGNIFICANT CHANGE UP (ref 27–31)
MCHC RBC-ENTMCNC: 30 PG — SIGNIFICANT CHANGE UP (ref 27–31)
MCHC RBC-ENTMCNC: 32.1 G/DL — SIGNIFICANT CHANGE UP (ref 32–37)
MCHC RBC-ENTMCNC: 32.9 G/DL — SIGNIFICANT CHANGE UP (ref 32–37)
MCV RBC AUTO: 91.1 FL — SIGNIFICANT CHANGE UP (ref 81–99)
MCV RBC AUTO: 93.2 FL — SIGNIFICANT CHANGE UP (ref 81–99)
MONOCYTES # BLD AUTO: 0.79 K/UL — HIGH (ref 0.1–0.6)
MONOCYTES # BLD AUTO: 0.89 K/UL — HIGH (ref 0.1–0.6)
MONOCYTES NFR BLD AUTO: 14.7 % — HIGH (ref 1.7–9.3)
MONOCYTES NFR BLD AUTO: 15.8 % — HIGH (ref 1.7–9.3)
NEUTROPHILS # BLD AUTO: 3.08 K/UL — SIGNIFICANT CHANGE UP (ref 1.4–6.5)
NEUTROPHILS # BLD AUTO: 3.51 K/UL — SIGNIFICANT CHANGE UP (ref 1.4–6.5)
NEUTROPHILS NFR BLD AUTO: 57.3 % — SIGNIFICANT CHANGE UP (ref 42.2–75.2)
NEUTROPHILS NFR BLD AUTO: 62.3 % — SIGNIFICANT CHANGE UP (ref 42.2–75.2)
NRBC # BLD: 0 /100 WBCS — SIGNIFICANT CHANGE UP (ref 0–0)
NRBC # BLD: 0 /100 WBCS — SIGNIFICANT CHANGE UP (ref 0–0)
PLATELET # BLD AUTO: 391 K/UL — SIGNIFICANT CHANGE UP (ref 130–400)
PLATELET # BLD AUTO: 394 K/UL — SIGNIFICANT CHANGE UP (ref 130–400)
PMV BLD: 10 FL — SIGNIFICANT CHANGE UP (ref 7.4–10.4)
PMV BLD: 10.4 FL — SIGNIFICANT CHANGE UP (ref 7.4–10.4)
POTASSIUM SERPL-MCNC: 5.1 MMOL/L — HIGH (ref 3.5–5)
POTASSIUM SERPL-MCNC: 5.2 MMOL/L — HIGH (ref 3.5–5)
POTASSIUM SERPL-MCNC: 5.6 MMOL/L — HIGH (ref 3.5–5)
POTASSIUM SERPL-SCNC: 5.1 MMOL/L — HIGH (ref 3.5–5)
POTASSIUM SERPL-SCNC: 5.2 MMOL/L — HIGH (ref 3.5–5)
POTASSIUM SERPL-SCNC: 5.6 MMOL/L — HIGH (ref 3.5–5)
PROT SERPL-MCNC: 4.4 G/DL — LOW (ref 6–8)
RBC # BLD: 2.37 M/UL — LOW (ref 4.2–5.4)
RBC # BLD: 2.51 M/UL — LOW (ref 4.2–5.4)
RBC # FLD: 15.6 % — HIGH (ref 11.5–14.5)
RBC # FLD: 15.7 % — HIGH (ref 11.5–14.5)
SODIUM SERPL-SCNC: 140 MMOL/L — SIGNIFICANT CHANGE UP (ref 135–146)
SODIUM SERPL-SCNC: 142 MMOL/L — SIGNIFICANT CHANGE UP (ref 135–146)
SODIUM SERPL-SCNC: 142 MMOL/L — SIGNIFICANT CHANGE UP (ref 135–146)
WBC # BLD: 5.38 K/UL — SIGNIFICANT CHANGE UP (ref 4.8–10.8)
WBC # BLD: 5.64 K/UL — SIGNIFICANT CHANGE UP (ref 4.8–10.8)
WBC # FLD AUTO: 5.38 K/UL — SIGNIFICANT CHANGE UP (ref 4.8–10.8)
WBC # FLD AUTO: 5.64 K/UL — SIGNIFICANT CHANGE UP (ref 4.8–10.8)

## 2024-11-10 PROCEDURE — 99233 SBSQ HOSP IP/OBS HIGH 50: CPT | Mod: GC

## 2024-11-10 PROCEDURE — 95720 EEG PHY/QHP EA INCR W/VEEG: CPT

## 2024-11-10 RX ORDER — BACITRACIN ZINC 500 UNIT/G
1 OINTMENT (GRAM) TOPICAL
Refills: 0 | Status: DISCONTINUED | OUTPATIENT
Start: 2024-11-10 | End: 2024-11-20

## 2024-11-10 RX ORDER — HYDRALAZINE HYDROCHLORIDE 10 MG/1
25 TABLET ORAL EVERY 8 HOURS
Refills: 0 | Status: DISCONTINUED | OUTPATIENT
Start: 2024-11-10 | End: 2024-11-20

## 2024-11-10 RX ORDER — SODIUM ZIRCONIUM CYCLOSILICATE 5 G/5G
10 POWDER, FOR SUSPENSION ORAL ONCE
Refills: 0 | Status: COMPLETED | OUTPATIENT
Start: 2024-11-10 | End: 2024-11-10

## 2024-11-10 RX ADMIN — Medication 4 UNIT(S): at 05:15

## 2024-11-10 RX ADMIN — SODIUM ZIRCONIUM CYCLOSILICATE 10 GRAM(S): 5 POWDER, FOR SUSPENSION ORAL at 17:45

## 2024-11-10 RX ADMIN — Medication 50 MILLIGRAM(S): at 13:06

## 2024-11-10 RX ADMIN — LACOSAMIDE 100 MILLIGRAM(S): 10 INJECTION INTRAVENOUS at 17:44

## 2024-11-10 RX ADMIN — Medication 4 UNIT(S): at 21:15

## 2024-11-10 RX ADMIN — LABETALOL 200 MILLIGRAM(S): 100 TABLET, FILM COATED ORAL at 05:13

## 2024-11-10 RX ADMIN — HYDRALAZINE HYDROCHLORIDE 5 MILLIGRAM(S): 10 TABLET ORAL at 04:10

## 2024-11-10 RX ADMIN — Medication 500 MILLIGRAM(S): at 13:06

## 2024-11-10 RX ADMIN — HYDRALAZINE HYDROCHLORIDE 5 MILLIGRAM(S): 10 TABLET ORAL at 17:43

## 2024-11-10 RX ADMIN — Medication 1 PACKET(S): at 05:14

## 2024-11-10 RX ADMIN — Medication 1 APPLICATION(S): at 17:43

## 2024-11-10 RX ADMIN — Medication 0: at 21:15

## 2024-11-10 RX ADMIN — Medication 0: at 05:16

## 2024-11-10 RX ADMIN — Medication 2: at 13:10

## 2024-11-10 RX ADMIN — AMLODIPINE BESYLATE 10 MILLIGRAM(S): 10 TABLET ORAL at 05:13

## 2024-11-10 RX ADMIN — LABETALOL 10 MILLIGRAM(S): 100 TABLET, FILM COATED ORAL at 05:29

## 2024-11-10 RX ADMIN — Medication 250 MILLIGRAM(S): at 17:44

## 2024-11-10 RX ADMIN — LABETALOL 200 MILLIGRAM(S): 100 TABLET, FILM COATED ORAL at 13:06

## 2024-11-10 RX ADMIN — LISINOPRIL 20 MILLIGRAM(S): 20 TABLET ORAL at 05:13

## 2024-11-10 RX ADMIN — Medication 220 MILLIGRAM(S): at 13:06

## 2024-11-10 RX ADMIN — LEVETIRACETAM 750 MILLIGRAM(S): 1000 TABLET ORAL at 05:12

## 2024-11-10 RX ADMIN — CHLORHEXIDINE GLUCONATE 1 APPLICATION(S): 1.2 RINSE ORAL at 05:15

## 2024-11-10 RX ADMIN — Medication 1 PACKET(S): at 17:44

## 2024-11-10 RX ADMIN — Medication 5000 UNIT(S): at 17:43

## 2024-11-10 RX ADMIN — Medication 5000 UNIT(S): at 05:12

## 2024-11-10 RX ADMIN — LEVETIRACETAM 750 MILLIGRAM(S): 1000 TABLET ORAL at 17:44

## 2024-11-10 RX ADMIN — LABETALOL 200 MILLIGRAM(S): 100 TABLET, FILM COATED ORAL at 21:15

## 2024-11-10 RX ADMIN — Medication 4 UNIT(S): at 13:10

## 2024-11-10 RX ADMIN — Medication 250 MILLIGRAM(S): at 05:14

## 2024-11-10 RX ADMIN — LACOSAMIDE 100 MILLIGRAM(S): 10 INJECTION INTRAVENOUS at 05:15

## 2024-11-10 NOTE — EEG REPORT - COMMENTS
Improvement in background in comparison to yesterday's recording
Findings consistent with diffuse electrocerebral dysfunction with suggestion of greater RIGHT hemispheric involvement

## 2024-11-10 NOTE — EEG REPORT - INTERMITTENT BACKGROUND DETAILS
Right hemispheric slowing
Moderate to severe generalized slowing evolving into moderate generalized slowing

## 2024-11-10 NOTE — EEG REPORT - TRIPHASIC WAVES
Periodic resolution of triphasic waves at to a few hours at a time after 13:00
higher right hemispheric

## 2024-11-10 NOTE — PROGRESS NOTE ADULT - ASSESSMENT
79F w/ PMHx of MDS, DM, HTN, CKD, recent admission for SDH s/p crani, evac and MMA embolization, presents with decreased MS, found to have stable SDH, admitted to NSICU for r/o status epilepticus     #SDH  #R/o Status epilepticus  #HTN  #MDS  #CKD  #Fever  r/o CSD   multifactorial encephalopathy    Plan:    Neurological: SDH   s/p ketamine gtt trial to r/o CSD  - Neuro Checks Q4 NC  CTH stable on 11/1  - MR Brain- without acuity; stable SDH  -  Keppra 750 gram q 12  f/u keppra level from 11/8 (with improved CrCl)- so will maintain same dose   11/4 keppra level was 98 (with Cr 1.5)  vimpat level 4.02-- increased vimpat to 100 mg q12 on 11/8  EEg with triphasic waves, mild improvement of background  c/w amantidine daily and monitor for clinical improvement   - Analgesia/Sedation: Tylenol PRN  - PT/OT  mobility as tolerates   ammonia wnl  obtain B1 and b12 levels testing    Cardiovascular:HTN   - SBP goal: 110 -  <150  pt was diuresed on 11/3  -  lisinopril 20mg q day , labetalol 200mg q 8; resumed home norvasc   prn labetalol/hydralazine   - Echo  TTE Echo Complete w/o Contrast w/ Doppler (10.09.24 @ 12:41) >Normal global left ventricular systolic function. Left ventricular ejection fraction, by visual estimation, is 65 to 70%  - EKG- QTc     Pulmonary:  L NC wean to off  - Wean to FIO2 > 92 %   - HOB 45  - Aspiration precautions  -  Mouth care per protocol    GI: diarrhea- - may be abx-associated (abx completing recently) vs feed related  - Darren feeding tube for bolus feeds per nutrition- f/u recs  - Banatrol + metamucil  q 12  - Dysphagia screen- fail   pt not on PPI or H2RA per outpatient records --> off GI ppx   no documented EGD noted   rectal tube 11/3  lactobacillus --> changed to florastor as per nutrition  LBM 11/10  will possibly require PEG;   GI consultation  f/u stool studies for diarrhea    /Renal:- AG Met acidosis - MARCO on CKD improving  primafit/bladder scan q 6  - Sodium Goal: 135 - 145  - Replete as needed.  - Mg > 2    Endo: Pre- diabetic   premeal ademalog   - POCT, ISS,  -180  - A1c: 6  - TSH: 2.63    ID: s/p UTI  - MRSA nasal neg   s/p ampicillin for enterococcus UTI completed  - Normothermia    Hematology: MDS/ Leucocytosis  anemia to 6.5, transfusing 1 unit on 11/7 with improved Hb to 8.8-->  7.8--> 7.1 (likely from MDS)  Chronic blood transfusions  - SCDs, chemoppx  anemia likely in setting of MDS   LUE venous doppler for swollen/indurated L forearm- Superficial thrombophlebitis of the left cephalic vein  elevate LUE, topical antiseptic and cold packs  neurovascular checks of LUE q4  cpk normal    MISC:  Stage 2 - buttocks - Zinc 220mg q day + Vit C 250mg q 12;. Wound consult , Rotate q 2 hrs per protocol   PT/OT [x]  CAM-ICU[x]  Family Updated[x]    Lines/Tubes:  PIV: x 2    Code Status:F; f/u with family regarding GOC - they wish for full code and all aggressive measures    Dispo: stable for medical sdu

## 2024-11-10 NOTE — PROGRESS NOTE ADULT - SUBJECTIVE AND OBJECTIVE BOX
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 While in NSICU, patient underwent MMA embolization and post operatively was stable, however, on 10/16 she was noted to have leaking from her crani site. STAT CTH showed increase in SDH and extracalvarial collection, NSG removed 25cc of blood and was re-sutured. Patient was then downgraded to the floor, was started on zosyn for UTI, and was discharged to short term rehab on 10/27. Today, patient was more lethargic and presented to the ED for evaluation. CTH showing stable SDH, CTA negative for acute stroke. NSx and NSICU consulted for management.    o/n-  eeg with triphasics, some improved background       REVIEW OF SYSTEMS: [X ] Unable to Assess due to neurologic exam     Neuro: [ ] Headache [ ] Back pain [ ] Numbness [ ] Weakness [ ] Ataxia [ ] Dizziness [ ] Aphasia [ ] Dysarthria [ ] Visual disturbance  Resp: [ ] Shortness of breath/dyspnea, [ ] Orthopnea [ ] Cough  CV: [ ] Chest pain [ ] Palpitation [ ] Lightheadedness [ ] Syncope  Renal: [ ] Thirst [ ] Edema  GI: [ ] Nausea [ ] Emesis [ ] Abdominal pain [ ] Constipation [ ] Diarrhea  Hem: [ ] Hematemesis [ ] bright red blood per rectum  ID: [ ] Fever [ ] Chills [ ] Dysuria  ENT: [ ] Rhinorrhea      Physical Exam: PHYSICAL EXAM:  GENERAL: well built, well nourished  EYES:  PERRLA, conjunctiva and sclera clear, R 3rd and L Vith nerve   NECK: No JVD,   Lungs- Clear yet decreased at bases   HEART: S1S2 normal, no S3, Regular rate and rhythm; No murmurs, rubs, or gallops  ABDOMEN: Soft, Nontender, Nondistended; Bowel sounds present  EXTREMITIES:  2+ Peripheral Pulses, edematous B/UE and LE ; LUE with induration firm, erythematous edema, neurovascular checks intact otherwise  SKIN:  admitted  stage 2  buttocks - on admission - scarring over, now improving   NEURO: lethargic, opens eyes to noxious stimuli, groaning, no comprehensible verbal output, midline gaze with R preference, PERRLA, R UPPER  WD, RLE - TF , LLE - TF and LUE - Min withdrawal          ICU Vital Signs Last 24 Hrs  T(C): 37.1 (10 Nov 2024 08:00), Max: 37.7 (10 Nov 2024 04:00)  T(F): 98.8 (10 Nov 2024 08:00), Max: 99.8 (10 Nov 2024 04:00)  HR: 76 (10 Nov 2024 10:00) (71 - 84)  BP: 137/60 (10 Nov 2024 10:00) (116/57 - 165/65)  BP(mean): 86 (10 Nov 2024 10:00) (82 - 122)  ABP: --  ABP(mean): --  RR: 16 (10 Nov 2024 10:00) (13 - 20)  SpO2: 94% (10 Nov 2024 10:00) (91% - 100%)      11-09-24 @ 07:01  -  11-10-24 @ 07:00  --------------------------------------------------------  IN: 1740 mL / OUT: 2050 mL / NET: -310 mL            amantadine Syrup 50 milliGRAM(s) Oral daily  amLODIPine   Tablet 10 milliGRAM(s) Oral daily  ascorbic acid 500 milliGRAM(s) Oral daily  bacitracin   Ointment 1 Application(s) Topical two times a day  bacitracin   Ointment 1 Application(s) Topical two times a day  chlorhexidine 2% Cloths 1 Application(s) Topical <User Schedule>  dextrose 5%. 1000 milliLiter(s) (50 mL/Hr) IV Continuous <Continuous>  dextrose 5%. 1000 milliLiter(s) (100 mL/Hr) IV Continuous <Continuous>  dextrose 50% Injectable 12.5 Gram(s) IV Push once  dextrose 50% Injectable 25 Gram(s) IV Push once  dextrose 50% Injectable 25 Gram(s) IV Push once  dextrose Oral Gel 15 Gram(s) Oral once PRN  glucagon  Injectable 1 milliGRAM(s) IntraMuscular once  heparin   Injectable 5000 Unit(s) SubCutaneous every 12 hours  hydrALAZINE Injectable 5 milliGRAM(s) IV Push every 2 hours PRN  insulin lispro (ADMELOG) corrective regimen sliding scale   SubCutaneous every 8 hours  insulin lispro Injectable (ADMELOG) 4 Unit(s) SubCutaneous every 8 hours  labetalol 200 milliGRAM(s) Oral every 8 hours  labetalol Injectable 10 milliGRAM(s) IV Push every 2 hours PRN  lacosamide Solution 100 milliGRAM(s) Oral every 12 hours  levETIRAcetam  Solution 750 milliGRAM(s) Enteral Tube every 12 hours  lisinopril 20 milliGRAM(s) Oral daily  magnesium sulfate  IVPB 2 Gram(s) IV Intermittent once  psyllium Powder 1 Packet(s) Oral every 12 hours  saccharomyces boulardii 250 milliGRAM(s) Oral two times a day  zinc sulfate 220 milliGRAM(s) Oral daily      LABS:  Na: 140 (11-10 @ 07:45), 144 (11-09 @ 09:10), 140 (11-08 @ 04:47)  K: 5.6 (11-10 @ 07:45), 4.7 (11-09 @ 09:10), 4.6 (11-08 @ 04:47)  Cl: 112 (11-10 @ 07:45), 113 (11-09 @ 09:10), 112 (11-08 @ 04:47)  CO2: 20 (11-10 @ 07:45), 23 (11-09 @ 09:10), 20 (11-08 @ 04:47)  BUN: 30 (11-10 @ 07:45), 31 (11-09 @ 09:10), 32 (11-08 @ 04:47)  Cr: 0.9 (11-10 @ 07:45), 0.9 (11-09 @ 09:10), 1.0 (11-08 @ 04:47)  Glu: 194(11-10 @ 07:45), 166(11-09 @ 09:10), 132(11-08 @ 04:47)    Hgb: 7.1 (11-10 @ 07:45), 7.8 (11-09 @ 09:10), 8.5 (11-08 @ 04:47), 8.8 (11-07 @ 20:46)  Hct: 21.6 (11-10 @ 07:45), 23.9 (11-09 @ 09:10), 25.1 (11-08 @ 04:47), 26.7 (11-07 @ 20:46)  WBC: 5.38 (11-10 @ 07:45), 7.33 (11-09 @ 09:10), 7.03 (11-08 @ 04:47), 8.32 (11-07 @ 20:46)  Plt: 391 (11-10 @ 07:45), 381 (11-09 @ 09:10), 321 (11-08 @ 04:47), 295 (11-07 @ 20:46)    INR:   PTT:           LIVER FUNCTIONS - ( 10 Nov 2024 07:45 )  Alb: 2.8 g/dL / Pro: 4.4 g/dL / ALK PHOS: 79 U/L / ALT: 14 U/L / AST: 21 U/L / GGT: x

## 2024-11-11 LAB
ALBUMIN SERPL ELPH-MCNC: 3.1 G/DL — LOW (ref 3.5–5.2)
ALP SERPL-CCNC: 87 U/L — SIGNIFICANT CHANGE UP (ref 30–115)
ALT FLD-CCNC: 16 U/L — SIGNIFICANT CHANGE UP (ref 0–41)
ANION GAP SERPL CALC-SCNC: 7 MMOL/L — SIGNIFICANT CHANGE UP (ref 7–14)
ANION GAP SERPL CALC-SCNC: 8 MMOL/L — SIGNIFICANT CHANGE UP (ref 7–14)
ANION GAP SERPL CALC-SCNC: 8 MMOL/L — SIGNIFICANT CHANGE UP (ref 7–14)
AST SERPL-CCNC: 15 U/L — SIGNIFICANT CHANGE UP (ref 0–41)
BASOPHILS # BLD AUTO: 0.09 K/UL — SIGNIFICANT CHANGE UP (ref 0–0.2)
BASOPHILS NFR BLD AUTO: 1.7 % — HIGH (ref 0–1)
BILIRUB SERPL-MCNC: 0.4 MG/DL — SIGNIFICANT CHANGE UP (ref 0.2–1.2)
BUN SERPL-MCNC: 30 MG/DL — HIGH (ref 10–20)
CALCIUM SERPL-MCNC: 8.9 MG/DL — SIGNIFICANT CHANGE UP (ref 8.4–10.5)
CALCIUM SERPL-MCNC: 9 MG/DL — SIGNIFICANT CHANGE UP (ref 8.4–10.5)
CALCIUM SERPL-MCNC: 9.1 MG/DL — SIGNIFICANT CHANGE UP (ref 8.4–10.5)
CHLORIDE SERPL-SCNC: 108 MMOL/L — SIGNIFICANT CHANGE UP (ref 98–110)
CHLORIDE SERPL-SCNC: 111 MMOL/L — HIGH (ref 98–110)
CHLORIDE SERPL-SCNC: 112 MMOL/L — HIGH (ref 98–110)
CHLORIDE UR-SCNC: 80 — SIGNIFICANT CHANGE UP
CO2 SERPL-SCNC: 23 MMOL/L — SIGNIFICANT CHANGE UP (ref 17–32)
CREAT SERPL-MCNC: 0.8 MG/DL — SIGNIFICANT CHANGE UP (ref 0.7–1.5)
CREAT SERPL-MCNC: 0.9 MG/DL — SIGNIFICANT CHANGE UP (ref 0.7–1.5)
CREAT SERPL-MCNC: 0.9 MG/DL — SIGNIFICANT CHANGE UP (ref 0.7–1.5)
CULTURE RESULTS: ABNORMAL
EGFR: 65 ML/MIN/1.73M2 — SIGNIFICANT CHANGE UP
EGFR: 65 ML/MIN/1.73M2 — SIGNIFICANT CHANGE UP
EGFR: 75 ML/MIN/1.73M2 — SIGNIFICANT CHANGE UP
EOSINOPHIL # BLD AUTO: 0.31 K/UL — SIGNIFICANT CHANGE UP (ref 0–0.7)
EOSINOPHIL NFR BLD AUTO: 5.7 % — SIGNIFICANT CHANGE UP (ref 0–8)
GLUCOSE BLDC GLUCOMTR-MCNC: 116 MG/DL — HIGH (ref 70–99)
GLUCOSE BLDC GLUCOMTR-MCNC: 138 MG/DL — HIGH (ref 70–99)
GLUCOSE BLDC GLUCOMTR-MCNC: 150 MG/DL — HIGH (ref 70–99)
GLUCOSE BLDC GLUCOMTR-MCNC: 171 MG/DL — HIGH (ref 70–99)
GLUCOSE SERPL-MCNC: 137 MG/DL — HIGH (ref 70–99)
GLUCOSE SERPL-MCNC: 149 MG/DL — HIGH (ref 70–99)
GLUCOSE SERPL-MCNC: 161 MG/DL — HIGH (ref 70–99)
HCT VFR BLD CALC: 23.2 % — LOW (ref 37–47)
HGB BLD-MCNC: 7.4 G/DL — LOW (ref 12–16)
IMM GRANULOCYTES NFR BLD AUTO: 0.4 % — HIGH (ref 0.1–0.3)
INR BLD: 0.99 RATIO — SIGNIFICANT CHANGE UP (ref 0.65–1.3)
LYMPHOCYTES # BLD AUTO: 1.15 K/UL — LOW (ref 1.2–3.4)
LYMPHOCYTES # BLD AUTO: 21.2 % — SIGNIFICANT CHANGE UP (ref 20.5–51.1)
MAGNESIUM SERPL-MCNC: 1.9 MG/DL — SIGNIFICANT CHANGE UP (ref 1.8–2.4)
MCHC RBC-ENTMCNC: 29.5 PG — SIGNIFICANT CHANGE UP (ref 27–31)
MCHC RBC-ENTMCNC: 31.9 G/DL — LOW (ref 32–37)
MCV RBC AUTO: 92.4 FL — SIGNIFICANT CHANGE UP (ref 81–99)
MONOCYTES # BLD AUTO: 0.74 K/UL — HIGH (ref 0.1–0.6)
MONOCYTES NFR BLD AUTO: 13.7 % — HIGH (ref 1.7–9.3)
NEUTROPHILS # BLD AUTO: 3.11 K/UL — SIGNIFICANT CHANGE UP (ref 1.4–6.5)
NEUTROPHILS NFR BLD AUTO: 57.3 % — SIGNIFICANT CHANGE UP (ref 42.2–75.2)
NRBC # BLD: 0 /100 WBCS — SIGNIFICANT CHANGE UP (ref 0–0)
PH STL: 6.2 — SIGNIFICANT CHANGE UP (ref 5.6–14)
PHOSPHATE SERPL-MCNC: 3.9 MG/DL — SIGNIFICANT CHANGE UP (ref 2.1–4.9)
PLATELET # BLD AUTO: 462 K/UL — HIGH (ref 130–400)
PMV BLD: 10.4 FL — SIGNIFICANT CHANGE UP (ref 7.4–10.4)
POTASSIUM SERPL-MCNC: 5.1 MMOL/L — HIGH (ref 3.5–5)
POTASSIUM SERPL-MCNC: 5.3 MMOL/L — HIGH (ref 3.5–5)
POTASSIUM SERPL-MCNC: 5.6 MMOL/L — HIGH (ref 3.5–5)
POTASSIUM SERPL-SCNC: 5.1 MMOL/L — HIGH (ref 3.5–5)
POTASSIUM SERPL-SCNC: 5.3 MMOL/L — HIGH (ref 3.5–5)
POTASSIUM SERPL-SCNC: 5.6 MMOL/L — HIGH (ref 3.5–5)
POTASSIUM UR-SCNC: 24 MMOL/L — SIGNIFICANT CHANGE UP
PROT SERPL-MCNC: 4.6 G/DL — LOW (ref 6–8)
PROTHROM AB SERPL-ACNC: 11.7 SEC — SIGNIFICANT CHANGE UP (ref 9.95–12.87)
RBC # BLD: 2.51 M/UL — LOW (ref 4.2–5.4)
RBC # FLD: 15.4 % — HIGH (ref 11.5–14.5)
SODIUM SERPL-SCNC: 138 MMOL/L — SIGNIFICANT CHANGE UP (ref 135–146)
SODIUM SERPL-SCNC: 142 MMOL/L — SIGNIFICANT CHANGE UP (ref 135–146)
SODIUM SERPL-SCNC: 143 MMOL/L — SIGNIFICANT CHANGE UP (ref 135–146)
SODIUM UR-SCNC: 76 MMOL/L — SIGNIFICANT CHANGE UP
SPECIMEN SOURCE: SIGNIFICANT CHANGE UP
WBC # BLD: 5.42 K/UL — SIGNIFICANT CHANGE UP (ref 4.8–10.8)
WBC # FLD AUTO: 5.42 K/UL — SIGNIFICANT CHANGE UP (ref 4.8–10.8)

## 2024-11-11 PROCEDURE — 99232 SBSQ HOSP IP/OBS MODERATE 35: CPT

## 2024-11-11 PROCEDURE — 99233 SBSQ HOSP IP/OBS HIGH 50: CPT

## 2024-11-11 PROCEDURE — 95720 EEG PHY/QHP EA INCR W/VEEG: CPT

## 2024-11-11 RX ORDER — FUROSEMIDE 40 MG/1
40 TABLET ORAL ONCE
Refills: 0 | Status: COMPLETED | OUTPATIENT
Start: 2024-11-11 | End: 2024-11-11

## 2024-11-11 RX ORDER — PSYLLIUM SEED
1 POWDER (GRAM) ORAL EVERY 8 HOURS
Refills: 0 | Status: DISCONTINUED | OUTPATIENT
Start: 2024-11-11 | End: 2024-11-20

## 2024-11-11 RX ORDER — INSULIN REG, HUM S-S BUFF 100/ML
10 VIAL (ML) INJECTION ONCE
Refills: 0 | Status: COMPLETED | OUTPATIENT
Start: 2024-11-11 | End: 2024-11-11

## 2024-11-11 RX ORDER — AMLODIPINE BESYLATE 10 MG/1
10 TABLET ORAL DAILY
Refills: 0 | Status: DISCONTINUED | OUTPATIENT
Start: 2024-11-11 | End: 2024-11-20

## 2024-11-11 RX ORDER — AMANTADINE HCL 100 MG
50 CAPSULE ORAL
Refills: 0 | Status: DISCONTINUED | OUTPATIENT
Start: 2024-11-11 | End: 2024-11-11

## 2024-11-11 RX ORDER — AMANTADINE HCL 100 MG
50 CAPSULE ORAL
Refills: 0 | Status: DISCONTINUED | OUTPATIENT
Start: 2024-11-11 | End: 2024-11-20

## 2024-11-11 RX ADMIN — Medication 50 MILLILITER(S): at 08:59

## 2024-11-11 RX ADMIN — CHLORHEXIDINE GLUCONATE 1 APPLICATION(S): 1.2 RINSE ORAL at 05:20

## 2024-11-11 RX ADMIN — Medication 5000 UNIT(S): at 17:54

## 2024-11-11 RX ADMIN — Medication 4 UNIT(S): at 14:43

## 2024-11-11 RX ADMIN — Medication 10 UNIT(S): at 08:59

## 2024-11-11 RX ADMIN — FUROSEMIDE 40 MILLIGRAM(S): 40 TABLET ORAL at 14:40

## 2024-11-11 RX ADMIN — LABETALOL 200 MILLIGRAM(S): 100 TABLET, FILM COATED ORAL at 21:52

## 2024-11-11 RX ADMIN — Medication 50 MILLIGRAM(S): at 11:37

## 2024-11-11 RX ADMIN — LEVETIRACETAM 750 MILLIGRAM(S): 1000 TABLET ORAL at 05:19

## 2024-11-11 RX ADMIN — Medication 1 PACKET(S): at 21:52

## 2024-11-11 RX ADMIN — Medication 50 MILLIGRAM(S): at 14:43

## 2024-11-11 RX ADMIN — HYDRALAZINE HYDROCHLORIDE 25 MILLIGRAM(S): 10 TABLET ORAL at 14:42

## 2024-11-11 RX ADMIN — Medication 500 MILLIGRAM(S): at 11:37

## 2024-11-11 RX ADMIN — Medication 1 APPLICATION(S): at 17:55

## 2024-11-11 RX ADMIN — Medication 1 APPLICATION(S): at 17:54

## 2024-11-11 RX ADMIN — Medication 1 PACKET(S): at 14:42

## 2024-11-11 RX ADMIN — HYDRALAZINE HYDROCHLORIDE 25 MILLIGRAM(S): 10 TABLET ORAL at 21:52

## 2024-11-11 RX ADMIN — LACOSAMIDE 100 MILLIGRAM(S): 10 INJECTION INTRAVENOUS at 17:54

## 2024-11-11 RX ADMIN — HYDRALAZINE HYDROCHLORIDE 25 MILLIGRAM(S): 10 TABLET ORAL at 05:19

## 2024-11-11 RX ADMIN — Medication 25 GRAM(S): at 09:00

## 2024-11-11 RX ADMIN — Medication 250 MILLIGRAM(S): at 05:19

## 2024-11-11 RX ADMIN — Medication 5000 UNIT(S): at 05:18

## 2024-11-11 RX ADMIN — LABETALOL 200 MILLIGRAM(S): 100 TABLET, FILM COATED ORAL at 05:19

## 2024-11-11 RX ADMIN — Medication 1 PACKET(S): at 05:18

## 2024-11-11 RX ADMIN — Medication 1 APPLICATION(S): at 05:18

## 2024-11-11 RX ADMIN — LEVETIRACETAM 750 MILLIGRAM(S): 1000 TABLET ORAL at 17:53

## 2024-11-11 RX ADMIN — Medication 220 MILLIGRAM(S): at 11:37

## 2024-11-11 RX ADMIN — Medication 4 UNIT(S): at 21:57

## 2024-11-11 RX ADMIN — LACOSAMIDE 100 MILLIGRAM(S): 10 INJECTION INTRAVENOUS at 05:20

## 2024-11-11 RX ADMIN — Medication 4 UNIT(S): at 05:20

## 2024-11-11 RX ADMIN — AMLODIPINE BESYLATE 10 MILLIGRAM(S): 10 TABLET ORAL at 05:19

## 2024-11-11 RX ADMIN — HYDRALAZINE HYDROCHLORIDE 5 MILLIGRAM(S): 10 TABLET ORAL at 10:23

## 2024-11-11 RX ADMIN — Medication 250 MILLIGRAM(S): at 17:54

## 2024-11-11 RX ADMIN — LABETALOL 200 MILLIGRAM(S): 100 TABLET, FILM COATED ORAL at 14:42

## 2024-11-11 RX ADMIN — Medication 0: at 05:20

## 2024-11-11 RX ADMIN — Medication 2: at 21:58

## 2024-11-11 NOTE — PROGRESS NOTE ADULT - THIS PATIENT HAS THE FOLLOWING CONDITION(S)/DIAGNOSES ON THIS ADMISSION:
SDH/seizure
Encephalopathy
SDH
Encephalopathy
SDH
Encephalopathy
Encephalopathy

## 2024-11-11 NOTE — PROGRESS NOTE ADULT - SUBJECTIVE AND OBJECTIVE BOX
Gastroenterology progress note:     Patient is a 79y old  Female who presents with a chief complaint of SDH, Seizures (11 Nov 2024 07:28)       Admitted on: 10-31-24    We are following the patient for: Dysphagia        Interval History:    No acute events overnight.          PAST MEDICAL & SURGICAL HISTORY:  DM (diabetes mellitus)      MDS (myelodysplastic syndrome)      H/O colonoscopy          MEDICATIONS  (STANDING):  amantadine Syrup 50 milliGRAM(s) Oral daily  amLODIPine   Tablet 10 milliGRAM(s) Oral daily  ascorbic acid 500 milliGRAM(s) Oral daily  bacitracin   Ointment 1 Application(s) Topical two times a day  bacitracin   Ointment 1 Application(s) Topical two times a day  chlorhexidine 2% Cloths 1 Application(s) Topical <User Schedule>  dextrose 5%. 1000 milliLiter(s) (100 mL/Hr) IV Continuous <Continuous>  dextrose 5%. 1000 milliLiter(s) (50 mL/Hr) IV Continuous <Continuous>  dextrose 50% Injectable 25 Gram(s) IV Push once  dextrose 50% Injectable 25 Gram(s) IV Push once  dextrose 50% Injectable 12.5 Gram(s) IV Push once  glucagon  Injectable 1 milliGRAM(s) IntraMuscular once  heparin   Injectable 5000 Unit(s) SubCutaneous every 12 hours  hydrALAZINE 25 milliGRAM(s) Oral every 8 hours  insulin lispro (ADMELOG) corrective regimen sliding scale   SubCutaneous every 8 hours  insulin lispro Injectable (ADMELOG) 4 Unit(s) SubCutaneous every 8 hours  labetalol 200 milliGRAM(s) Oral every 8 hours  lacosamide Solution 100 milliGRAM(s) Oral every 12 hours  levETIRAcetam  Solution 750 milliGRAM(s) Enteral Tube every 12 hours  psyllium Powder 1 Packet(s) Oral every 12 hours  saccharomyces boulardii 250 milliGRAM(s) Oral two times a day  zinc sulfate 220 milliGRAM(s) Oral daily    MEDICATIONS  (PRN):  dextrose Oral Gel 15 Gram(s) Oral once PRN Blood Glucose LESS THAN 70 milliGRAM(s)/deciliter  hydrALAZINE Injectable 5 milliGRAM(s) IV Push every 2 hours PRN SBP >150  labetalol Injectable 10 milliGRAM(s) IV Push every 2 hours PRN SBP > 150      Allergies  No Known Allergies      Review of Systems:  limited by mental status     Physical Examination:  T(C): 37.1 (11-11-24 @ 12:00), Max: 37.1 (11-11-24 @ 12:00)  HR: 85 (11-11-24 @ 12:00) (68 - 88)  BP: 126/60 (11-11-24 @ 12:00) (118/55 - 164/65)  RR: 16 (11-11-24 @ 12:00) (13 - 26)  SpO2: 96% (11-11-24 @ 12:00) (95% - 98%)      11-10-24 @ 07:01  -  11-11-24 @ 07:00  --------------------------------------------------------  IN: 1895 mL / OUT: 1860 mL / NET: 35 mL    11-11-24 @ 07:01  -  11-11-24 @ 12:29  --------------------------------------------------------  IN: 580 mL / OUT: 200 mL / NET: 380 mL        GENERAL: lethargic, NG in place   HEAD:  Atraumatic, Normocephalic  EYES: conjunctiva and sclera clear  NECK: Supple, no JVD or thyromegaly  CHEST/LUNG: Clear to auscultation bilaterally; No wheeze, rhonchi, or rales  HEART: Regular rate and rhythm; normal S1, S2, No murmurs.  ABDOMEN: Soft, nontender, nondistended; Bowel sounds present  NEUROLOGY: No asterixis or tremor.   SKIN: Intact, no jaundice     Data:                        7.4    5.42  )-----------( 462      ( 11 Nov 2024 04:30 )             23.2     Hgb trend:  7.4  11-11-24 @ 04:30  7.5  11-10-24 @ 16:42  7.1  11-10-24 @ 07:45  7.8  11-09-24 @ 09:10        11-11    143  |  112[H]  |  30[H]  ----------------------------<  161[H]  5.3[H]   |  23  |  0.8    Ca    8.9      11 Nov 2024 04:30  Phos  3.9     11-11  Mg     1.9     11-11    TPro  4.6[L]  /  Alb  3.1[L]  /  TBili  0.4  /  DBili  x   /  AST  15  /  ALT  16  /  AlkPhos  87  11-11    Liver panel trend:  TBili 0.4   /   AST 15   /   ALT 16   /   AlkP 87   /   Tptn 4.6   /   Alb 3.1    /   DBili --      11-11  TBili 0.4   /   AST 21   /   ALT 14   /   AlkP 79   /   Tptn 4.4   /   Alb 2.8    /   DBili --      11-10  TBili 0.4   /   AST 13   /   ALT 14   /   AlkP 78   /   Tptn 4.5   /   Alb 3.0    /   DBili --      11-09  TBili 0.5   /   AST 11   /   ALT 15   /   AlkP 75   /   Tptn 4.2   /   Alb 2.8    /   DBili --      11-07  TBili 0.4   /   AST 12   /   ALT 21   /   AlkP 93   /   Tptn 4.6   /   Alb 3.1    /   DBili --      11-06  TBili 0.5   /   AST 19   /   ALT 17   /   AlkP 72   /   Tptn 3.7   /   Alb 2.6    /   DBili --      11-05  TBili 0.7   /   AST 18   /   ALT 21   /   AlkP 77   /   Tptn 4.5   /   Alb 3.3    /   DBili --      11-03  TBili 1.2   /   AST 20   /   ALT 25   /   AlkP 84   /   Tptn 4.9   /   Alb 3.5    /   DBili --      11-02      PT/INR - ( 11 Nov 2024 04:30 )   PT: 11.70 sec;   INR: 0.99 ratio             Culture - Stool (collected 09 Nov 2024 17:30)  Source: .Stool  Preliminary Report (10 Nov 2024 17:40):    No enteric pathogens to date: Final culture pending    No enteric gram negative rods isolated

## 2024-11-11 NOTE — PROGRESS NOTE ADULT - ASSESSMENT
79F w/ PMHx of MDS, DM, HTN, CKD, recent admission for SDH s/p crani, evac and MMA embolization, presents with decreased MS, found to have stable SDH, admitted to NSICU for r/o status epilepticus GI was consulted for PEG placement.     # Dysphagia    # SDH with possible Status epilepticus  # Evaluation for PEG placement   failed dysphagia screen   discussed with neuro ICU team today, patient will need PEG  discussed with HCP daughter Jose over the phone       recommendations:  will schedule for EGD with PEG placement in AM as add on  NPO after mid night  optimize pre op blood work

## 2024-11-11 NOTE — PHARMACOTHERAPY INTERVENTION NOTE - COMMENTS
labetalol 50mg ng q8h stat dose, admt time for now & 1400, d/w [facility] team, cancel stat dose
SCr 1.5, CrCl~27, ampicillin 1g IV q6h recommended changing to q12h
amantadine syr 50mg ng @ 0600 & 1400, stat dose, slotted for 1305 & 1400 administration, d/w [facility], cancel stat, start @ 1400
7

## 2024-11-11 NOTE — CHART NOTE - NSCHARTNOTEFT_GEN_A_CORE
NCCU Transfer Note    Transfer from: NCCU    Transfer to: Medical Stepdown     Accepting Physician: Dr. Reid; sign out given by NSICU Attending Dr. Schwab    Signout given to:     --------------------------------------------------------------------------------------------------------------------------------  HPI / NCCU COURSE:    HPI:  79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 While in NSICU, patient underwent MMA embolization and post operatively was stable, however, on 10/16 she was noted to have leaking from her crani site. STAT CTH showed increase in SDH and extracalvarial collection, NSG removed 25cc of blood and was re-sutured. Patient was then downgraded to the floor, was started on zosyn for UTI, and was discharged to short term rehab on 10/27. Today, patient was more lethargic and presented to the ED for evaluation. CTH showing stable SDH, CTA negative for acute stroke. NSx and NSICU consulted for management.      (31 Oct 2024 15:51)      [facility] Hospital Course:  •10/31: Returned from NH after being altered, CTH stable, concern for status epilepticus, given keppra, GCS improved, started on ABX for sepsis  •11/1: lowered keppra for CrCl, added vimpat. switched cefepime to zosyn, given 1 PRBC, nPO failed dysphagia screen overnight, IVF increased to 75 , needs restrain not to pull drain, can give zyprexa or fentanyl   •11/2: Ketamine gtt started for possible CSD  •11/3: K-hole increased to 1.5, labetalol PO started  •11/4: K-hole titration  •11/7: s/p 1 unit PRBC for hbg 6.5  •11/8: EEG negative, keppra level sent    Patient is hemodynamically and neurologically stable for downgrade to Medical Stepdown   --------------------------------------------------------------------------------------------------------------------------------  PAST MEDICAL & SURGICAL HISTORY:  DM (diabetes mellitus)  MDS (myelodysplastic syndrome)  H/O colonoscopy      Allergies    No Known Allergies    Intolerances      Physical Exam: PHYSICAL EXAM:  GENERAL: well built, well nourished  EYES:  PERRLA, conjunctiva and sclera clear, R 3rd and L Vith nerve   NECK: No JVD,   Lungs- Clear yet decreased at bases   HEART: S1S2 normal, no S3, Regular rate and rhythm; No murmurs, rubs, or gallops  ABDOMEN: Soft, Nontender, Nondistended; Bowel sounds present  EXTREMITIES:  2+ Peripheral Pulses, edematous B/UE and LE ; LUE with induration firm, erythematous edema, neurovascular checks intact otherwise  SKIN:  admitted  stage 2  buttocks - on admission - scarring over, now improving   NEURO: lethargic, opens eyes to noxious stimuli, groaning, no comprehensible verbal output, midline gaze with R preference, PERRLA, R UPPER  WD, RLE - TF , LLE - TF and LUE - Min withdrawal        Vital Signs Last 24 Hrs  T(C): 36.6 (10 Nov 2024 20:00), Max: 37.7 (10 Nov 2024 04:00)  T(F): 97.8 (10 Nov 2024 20:00), Max: 99.8 (10 Nov 2024 04:00)  HR: 74 (10 Nov 2024 22:00) (68 - 84)  BP: 139/65 (10 Nov 2024 22:00) (116/57 - 161/67)  BP(mean): 93 (10 Nov 2024 22:00) (82 - 103)  RR: 19 (10 Nov 2024 22:00) (13 - 26)  SpO2: 97% (10 Nov 2024 22:00) (94% - 99%)    Parameters below as of 10 Nov 2024 20:00  Patient On (Oxygen Delivery Method): room air        I&O's Summary    09 Nov 2024 07:01  -  10 Nov 2024 07:00  --------------------------------------------------------  IN: 1740 mL / OUT: 2050 mL / NET: -310 mL    10 Nov 2024 07:01  -  11 Nov 2024 00:54  --------------------------------------------------------  IN: 580 mL / OUT: 1260 mL / NET: -680 mL        LABS:                               7.5    5.64  )-----------( 394      ( 10 Nov 2024 16:42 )             23.4       11-10    142  |  111[H]  |  29[H]  ----------------------------<  162[H]  5.1[H]   |  23  |  0.9    Ca    9.1      10 Nov 2024 22:14  Phos  3.4     11-09  Mg     2.0     11-10    TPro  4.4[L]  /  Alb  2.8[L]  /  TBili  0.4  /  DBili  x   /  AST  21  /  ALT  14  /  AlkPhos  79  11-10              --------------------------------------------------------------------------------------------------------------------------------  ASSESSMENT & PLAN:   · Assessment	  79F w/ PMHx of MDS, DM, HTN, CKD, recent admission for SDH s/p crani, evac and MMA embolization, presents with decreased MS, found to have stable SDH, admitted to NSICU for r/o status epilepticus     #SDH  #R/o Status epilepticus  #HTN  #MDS  #CKD  #Fever  r/o CSD   multifactorial encephalopathy    Plan:    Neurological: SDH   s/p ketamine gtt trial to r/o CSD  - Neuro Checks Q4 NC  CTH stable on 11/1  - MR Brain- without acuity; stable SDH  -  Keppra 750 gram q 12  f/u keppra level from 11/8 (with improved CrCl)- so will maintain same dose   11/4 keppra level was 98 (with Cr 1.5)  vimpat level 4.02-- increased vimpat to 100 mg q12 on 11/8  EEg with triphasic waves, mild improvement of background  c/w amantidine daily and monitor for clinical improvement   - Analgesia/Sedation: Tylenol PRN  - PT/OT  mobility as tolerates   ammonia wnl  obtain B1 and b12 levels testing    Cardiovascular:HTN   - SBP goal: 110 -  <150  pt was diuresed on 11/3  -  lisinopril 20mg q day , labetalol 200mg q 8; resumed home norvasc   prn labetalol/hydralazine   - Echo  TTE Echo Complete w/o Contrast w/ Doppler (10.09.24 @ 12:41) >Normal global left ventricular systolic function. Left ventricular ejection fraction, by visual estimation, is 65 to 70%  - EKG- QTc     Pulmonary:  L NC wean to off  - Wean to FIO2 > 92 %   - HOB 45  - Aspiration precautions  -  Mouth care per protocol    GI: diarrhea- - may be abx-associated (abx completing recently) vs feed related  - Darren feeding tube for bolus feeds per nutrition- f/u recs  - Banatrol + metamucil  q 12  - Dysphagia screen- fail   pt not on PPI or H2RA per outpatient records --> off GI ppx   no documented EGD noted   rectal tube 11/3  lactobacillus --> changed to florastor as per nutrition  LBM 11/10  will possibly require PEG;   GI consultation  f/u stool studies for diarrhea    /Renal:- AG Met acidosis - MARCO on CKD improving  primafit/bladder scan q 6  - Sodium Goal: 135 - 145  - Replete as needed.  - Mg > 2    Endo: Pre- diabetic   premeal ademalog   - POCT, ISS,  -180  - A1c: 6  - TSH: 2.63    ID: s/p UTI  - MRSA nasal neg   s/p ampicillin for enterococcus UTI completed  - Normothermia    Hematology: MDS/ Leucocytosis  anemia to 6.5, transfusing 1 unit on 11/7 with improved Hb to 8.8-->  7.8--> 7.1 (likely from MDS)  Chronic blood transfusions  - SCDs, chemoppx  anemia likely in setting of MDS   LUE venous doppler for swollen/indurated L forearm- Superficial thrombophlebitis of the left cephalic vein  elevate LUE, topical antiseptic and cold packs  neurovascular checks of LUE q4  cpk normal    MISC:  Stage 2 - buttocks - Zinc 220mg q day + Vit C 250mg q 12;. Wound consult , Rotate q 2 hrs per protocol   PT/OT [x]  CAM-ICU[x]  Family Updated[x]    Lines/Tubes:  PIV: x 2    Code Status:F; f/u with family regarding GOC - they wish for full code and all aggressive measures    Dispo: stable for medical sdu        --------------------------------------------------------------------------------------------------------------------------------  FOR FOLLOW UP:  [ ]   [ ]   [ ]         [facility]  [id number] NCCU Transfer Note    Transfer from: NCCU    Transfer to: Medical Stepdown     Accepting Physician: Dr. Reid; sign out given by NSICU Attending Dr. Schwab    Signout given to:     --------------------------------------------------------------------------------------------------------------------------------  HPI / NCCU COURSE:    HPI:  79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 While in NSICU, patient underwent MMA embolization and post operatively was stable, however, on 10/16 she was noted to have leaking from her crani site. STAT CTH showed increase in SDH and extracalvarial collection, NSG removed 25cc of blood and was re-sutured. Patient was then downgraded to the floor, was started on zosyn for UTI, and was discharged to short term rehab on 10/27. Today, patient was more lethargic and presented to the ED for evaluation. CTH showing stable SDH, CTA negative for acute stroke. NSx and NSICU consulted for management.      (31 Oct 2024 15:51)      [facility] Hospital Course:  •10/31: Returned from NH after being altered, CTH stable, concern for status epilepticus, given keppra, GCS improved, started on ABX for sepsis  •11/1: lowered keppra for CrCl, added vimpat. switched cefepime to zosyn, given 1 PRBC, nPO failed dysphagia screen overnight, IVF increased to 75 , needs restrain not to pull drain, can give zyprexa or fentanyl   •11/2: Ketamine gtt started for possible CSD  •11/3: K-hole increased to 1.5, labetalol PO started  •11/4: K-hole titration  •11/7: s/p 1 unit PRBC for hbg 6.5  •11/8: EEG negative, keppra level sent    Patient is hemodynamically and neurologically stable for downgrade to Medical Stepdown   --------------------------------------------------------------------------------------------------------------------------------  PAST MEDICAL & SURGICAL HISTORY:  DM (diabetes mellitus)  MDS (myelodysplastic syndrome)  H/O colonoscopy      Allergies    No Known Allergies    Intolerances      Physical Exam: PHYSICAL EXAM:  GENERAL: well built, well nourished  EYES:  PERRLA, conjunctiva and sclera clear, R 3rd and L Vith nerve   NECK: No JVD,   Lungs- Clear yet decreased at bases   HEART: S1S2 normal, no S3, Regular rate and rhythm; No murmurs, rubs, or gallops  ABDOMEN: Soft, Nontender, Nondistended; Bowel sounds present  EXTREMITIES:  2+ Peripheral Pulses, edematous B/UE and LE ; LUE with induration firm, erythematous edema, neurovascular checks intact otherwise  SKIN:  admitted  stage 2  buttocks - on admission - scarring over, now improving   NEURO: lethargic, opens eyes to noxious stimuli, groaning, no comprehensible verbal output, midline gaze with R preference, PERRLA, R UPPER  WD, RLE - TF , LLE - TF and LUE - Min withdrawal        Vital Signs Last 24 Hrs  T(C): 36.6 (10 Nov 2024 20:00), Max: 37.7 (10 Nov 2024 04:00)  T(F): 97.8 (10 Nov 2024 20:00), Max: 99.8 (10 Nov 2024 04:00)  HR: 74 (10 Nov 2024 22:00) (68 - 84)  BP: 139/65 (10 Nov 2024 22:00) (116/57 - 161/67)  BP(mean): 93 (10 Nov 2024 22:00) (82 - 103)  RR: 19 (10 Nov 2024 22:00) (13 - 26)  SpO2: 97% (10 Nov 2024 22:00) (94% - 99%)    Parameters below as of 10 Nov 2024 20:00  Patient On (Oxygen Delivery Method): room air        I&O's Summary    09 Nov 2024 07:01  -  10 Nov 2024 07:00  --------------------------------------------------------  IN: 1740 mL / OUT: 2050 mL / NET: -310 mL    10 Nov 2024 07:01  -  11 Nov 2024 00:54  --------------------------------------------------------  IN: 580 mL / OUT: 1260 mL / NET: -680 mL        LABS:                               7.5    5.64  )-----------( 394      ( 10 Nov 2024 16:42 )             23.4       11-10    142  |  111[H]  |  29[H]  ----------------------------<  162[H]  5.1[H]   |  23  |  0.9    Ca    9.1      10 Nov 2024 22:14  Phos  3.4     11-09  Mg     2.0     11-10    TPro  4.4[L]  /  Alb  2.8[L]  /  TBili  0.4  /  DBili  x   /  AST  21  /  ALT  14  /  AlkPhos  79  11-10              --------------------------------------------------------------------------------------------------------------------------------  ASSESSMENT & PLAN:   · Assessment	  79F w/ PMHx of MDS, DM, HTN, CKD, recent admission for SDH s/p crani, evac and MMA embolization, presents with decreased MS, found to have stable SDH, admitted to NSICU for r/o status epilepticus     #SDH  #R/o Status epilepticus  #HTN  #MDS  #CKD  #Fever  r/o CSD   multifactorial encephalopathy    Plan:    Neurological: SDH   s/p ketamine gtt trial to r/o CSD  - Neuro Checks Q4 NC  CTH stable on 11/1  - MR Brain- without acuity; stable SDH  -  Keppra 750 gram q 12  f/u keppra level from 11/8 (with improved CrCl)- so will maintain same dose   11/4 keppra level was 98 (with Cr 1.5)  vimpat level 4.02-- increased vimpat to 100 mg q12 on 11/8  EEg with triphasic waves, mild improvement of background  c/w amantidine daily and monitor for clinical improvement   - Analgesia/Sedation: Tylenol PRN  - PT/OT  mobility as tolerates   ammonia wnl  obtain B1 and b12 levels testing    Cardiovascular:HTN   - SBP goal: 110 -  <150  pt was diuresed on 11/3  -  lisinopril 20mg q day , labetalol 200mg q 8; resumed home norvasc   prn labetalol/hydralazine   - Echo  TTE Echo Complete w/o Contrast w/ Doppler (10.09.24 @ 12:41) >Normal global left ventricular systolic function. Left ventricular ejection fraction, by visual estimation, is 65 to 70%  - EKG- QTc     Pulmonary:  L NC wean to off  - Wean to FIO2 > 92 %   - HOB 45  - Aspiration precautions  -  Mouth care per protocol    GI: diarrhea- - may be abx-associated (abx completing recently) vs feed related  - s/p PEG 11/12  - Banatrol + metamucil  q 12  - Dysphagia screen- fail   pt not on PPI or H2RA per outpatient records --> off GI ppx   no documented EGD noted   rectal tube 11/3  lactobacillus --> changed to florastor as per nutrition  LBM 11/12  f/u stool studies for diarrhea    /Renal:- AG Met acidosis - MARCO on CKD improving  primafit/bladder scan q 6  - Sodium Goal: 135 - 145  - Replete as needed.  - Mg > 2    Endo: Pre- diabetic   premeal ademalog   - POCT, ISS,  -180  - A1c: 6  - TSH: 2.63    ID: s/p UTI  - MRSA nasal neg   s/p ampicillin for enterococcus UTI completed  - Normothermia    Hematology: MDS/ Leucocytosis  anemia to 6.5, transfusing 1 unit on 11/7 with improved Hb to 8.8-->  7.8--> 7.1 (likely from MDS)  Chronic blood transfusions  - SCDs, chemoppx  anemia likely in setting of MDS   LUE venous doppler for swollen/indurated L forearm- Superficial thrombophlebitis of the left cephalic vein  elevate LUE, topical antiseptic and cold packs  neurovascular checks of LUE q4  cpk normal    MISC:  Stage 2 - buttocks - Zinc 220mg q day + Vit C 250mg q 12;. Wound consult , Rotate q 2 hrs per protocol   PT/OT [x]  CAM-ICU[x]  Family Updated[x]    Lines/Tubes:  PIV: x 2    Code Status:F; f/u with family regarding GOC - they wish for full code and all aggressive measures    Dispo: stable for medical sdu        --------------------------------------------------------------------------------------------------------------------------------  FOR FOLLOW UP:  [ ] s/p PEG 11/12:  ok to restart meds at 1800, restart feeds 11/13  [ ]         [facility]  [id number]    Transfer from: NCCU    Transfer to: Medical Stepdown     Accepting Physician: Dr. Reid; sign out given by NSICU Attending Dr. Schwab    Signout given to:     --------------------------------------------------------------------------------------------------------------------------------  HPI / NCCU COURSE:    HPI:  79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 While in NSICU, patient underwent MMA embolization and post operatively was stable, however, on 10/16 she was noted to have leaking from her crani site. STAT CTH showed increase in SDH and extracalvarial collection, NSG removed 25cc of blood and was re-sutured. Patient was then downgraded to the floor, was started on zosyn for UTI, and was discharged to short term rehab on 10/27. Today, patient was more lethargic and presented to the ED for evaluation. CTH showing stable SDH, CTA negative for acute stroke. NSx and NSICU consulted for management.      (31 Oct 2024 15:51)      [facility] Hospital Course:  •10/31: Returned from NH after being altered, CTH stable, concern for status epilepticus, given keppra, GCS improved, started on ABX for sepsis  •11/1: lowered keppra for CrCl, added vimpat. switched cefepime to zosyn, given 1 PRBC, nPO failed dysphagia screen overnight, IVF increased to 75 , needs restrain not to pull drain, can give zyprexa or fentanyl   •11/2: Ketamine gtt started for possible CSD  •11/3: K-hole increased to 1.5, labetalol PO started  •11/4: K-hole titration  •11/7: s/p 1 unit PRBC for hbg 6.5  •11/8: EEG negative, keppra level sent    Patient is hemodynamically and neurologically stable for downgrade to Medical Stepdown   --------------------------------------------------------------------------------------------------------------------------------  PAST MEDICAL & SURGICAL HISTORY:  DM (diabetes mellitus)  MDS (myelodysplastic syndrome)  H/O colonoscopy      Allergies    No Known Allergies    Intolerances      Physical Exam: PHYSICAL EXAM:  GENERAL: well built, well nourished  EYES:  PERRLA, conjunctiva and sclera clear, R 3rd and L Vith nerve   NECK: No JVD,   Lungs- Clear yet decreased at bases   HEART: S1S2 normal, no S3, Regular rate and rhythm; No murmurs, rubs, or gallops  ABDOMEN: Soft, Nontender, Nondistended; Bowel sounds present  EXTREMITIES:  2+ Peripheral Pulses, edematous B/UE and LE ; LUE with induration firm, erythematous edema, neurovascular checks intact otherwise  SKIN:  admitted  stage 2  buttocks - on admission - scarring over, now improving   NEURO: lethargic, opens eyes to noxious stimuli, groaning, no comprehensible verbal output, midline gaze with R preference, PERRLA, R UPPER  WD, RLE - TF , LLE - TF and LUE - Min withdrawal        Vital Signs Last 24 Hrs  T(C): 36.6 (10 Nov 2024 20:00), Max: 37.7 (10 Nov 2024 04:00)  T(F): 97.8 (10 Nov 2024 20:00), Max: 99.8 (10 Nov 2024 04:00)  HR: 74 (10 Nov 2024 22:00) (68 - 84)  BP: 139/65 (10 Nov 2024 22:00) (116/57 - 161/67)  BP(mean): 93 (10 Nov 2024 22:00) (82 - 103)  RR: 19 (10 Nov 2024 22:00) (13 - 26)  SpO2: 97% (10 Nov 2024 22:00) (94% - 99%)    Parameters below as of 10 Nov 2024 20:00  Patient On (Oxygen Delivery Method): room air        I&O's Summary    09 Nov 2024 07:01  -  10 Nov 2024 07:00  --------------------------------------------------------  IN: 1740 mL / OUT: 2050 mL / NET: -310 mL    10 Nov 2024 07:01  -  11 Nov 2024 00:54  --------------------------------------------------------  IN: 580 mL / OUT: 1260 mL / NET: -680 mL        LABS:                               7.5    5.64  )-----------( 394      ( 10 Nov 2024 16:42 )             23.4       11-10    142  |  111[H]  |  29[H]  ----------------------------<  162[H]  5.1[H]   |  23  |  0.9    Ca    9.1      10 Nov 2024 22:14  Phos  3.4     11-09  Mg     2.0     11-10    TPro  4.4[L]  /  Alb  2.8[L]  /  TBili  0.4  /  DBili  x   /  AST  21  /  ALT  14  /  AlkPhos  79  11-10              --------------------------------------------------------------------------------------------------------------------------------  ASSESSMENT & PLAN:   · Assessment	  79F w/ PMHx of MDS, DM, HTN, CKD, recent admission for SDH s/p crani, evac and MMA embolization, presents with decreased MS, found to have stable SDH, admitted to NSICU for r/o status epilepticus     #SDH  #R/o Status epilepticus  #HTN  #MDS  #CKD  #Fever  r/o CSD   multifactorial encephalopathy    Plan:    Neurological: SDH   s/p ketamine gtt trial to r/o CSD  - Neuro Checks Q4 NC  CTH stable on 11/1  - MR Brain- without acuity; stable SDH  -  Keppra 750 gram q 12  f/u keppra level from 11/8 (with improved CrCl)- so will maintain same dose   11/4 keppra level was 98 (with Cr 1.5)  vimpat level 4.02-- increased vimpat to 100 mg q12 on 11/8  EEg with triphasic waves, mild improvement of background  c/w amantidine daily and monitor for clinical improvement   - Analgesia/Sedation: Tylenol PRN  - PT/OT  mobility as tolerates   ammonia wnl  obtain B1 and b12 levels testing    Cardiovascular:HTN   - SBP goal: 110 -  <150  pt was diuresed on 11/3  -  lisinopril 20mg q day , labetalol 200mg q 8; resumed home norvasc   prn labetalol/hydralazine   - Echo  TTE Echo Complete w/o Contrast w/ Doppler (10.09.24 @ 12:41) >Normal global left ventricular systolic function. Left ventricular ejection fraction, by visual estimation, is 65 to 70%  - EKG- QTc     Pulmonary:  L NC wean to off  - Wean to FIO2 > 92 %   - HOB 45  - Aspiration precautions  -  Mouth care per protocol    GI: diarrhea- - may be abx-associated (abx completing recently) vs feed related  - s/p PEG 11/12  - Banatrol + metamucil  q 12  - Dysphagia screen- fail   pt not on PPI or H2RA per outpatient records --> off GI ppx   no documented EGD noted   rectal tube 11/3  lactobacillus --> changed to florastor as per nutrition  LBM 11/12  f/u stool studies for diarrhea    /Renal:- AG Met acidosis - MARCO on CKD improving  primafit/bladder scan q 6  - Sodium Goal: 135 - 145  - Replete as needed.  - Mg > 2    Endo: Pre- diabetic   premeal ademalog   - POCT, ISS,  -180  - A1c: 6  - TSH: 2.63    ID: s/p UTI  - MRSA nasal neg   s/p ampicillin for enterococcus UTI completed  - Normothermia    Hematology: MDS/ Leucocytosis  anemia to 6.5, transfusing 1 unit on 11/7 with improved Hb to 8.8-->  7.8--> 7.1 (likely from MDS)  Chronic blood transfusions  - SCDs, chemoppx  anemia likely in setting of MDS   LUE venous doppler for swollen/indurated L forearm- Superficial thrombophlebitis of the left cephalic vein  elevate LUE, topical antiseptic and cold packs  neurovascular checks of LUE q4  cpk normal    MISC:  Stage 2 - buttocks - Zinc 220mg q day + Vit C 250mg q 12;. Wound consult , Rotate q 2 hrs per protocol   PT/OT [x]  CAM-ICU[x]  Family Updated[x]    Lines/Tubes:  PIV: x 2    Code Status:F; f/u with family regarding GOC - they wish for full code and all aggressive measures    Dispo: stable for medical sdu        --------------------------------------------------------------------------------------------------------------------------------  FOR FOLLOW UP:  [ ] s/p PEG 11/12:  ok to restart meds at 1800, restart feeds 11/13  [ ] monitor BL UE phlebitis 2/2 IV infiltration        NSICU  [id number] NCCU Transfer Note    Transfer from: NCCU    Transfer to: Medical Stepdown     Accepting Physician: Dr. Reid; sign out given by NSICU Attending Dr. Schwab    Signout given to: Dr. Garcias 11/12 @1520    --------------------------------------------------------------------------------------------------------------------------------  HPI / NCCU COURSE:    HPI:  79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 While in NSICU, patient underwent MMA embolization and post operatively was stable, however, on 10/16 she was noted to have leaking from her crani site. STAT CTH showed increase in SDH and extracalvarial collection, NSG removed 25cc of blood and was re-sutured. Patient was then downgraded to the floor, was started on zosyn for UTI, and was discharged to short term rehab on 10/27. Today, patient was more lethargic and presented to the ED for evaluation. CTH showing stable SDH, CTA negative for acute stroke. NSx and NSICU consulted for management.      (31 Oct 2024 15:51)      [facility] Hospital Course:  •10/31: Returned from NH after being altered, CTH stable, concern for status epilepticus, given keppra, GCS improved, started on ABX for sepsis  •11/1: lowered keppra for CrCl, added vimpat. switched cefepime to zosyn, given 1 PRBC, nPO failed dysphagia screen overnight, IVF increased to 75 , needs restrain not to pull drain, can give zyprexa or fentanyl   •11/2: Ketamine gtt started for possible CSD  •11/3: K-hole increased to 1.5, labetalol PO started  •11/4: K-hole titration  •11/7: s/p 1 unit PRBC for hbg 6.5  •11/8: EEG negative, keppra level sent    Patient is hemodynamically and neurologically stable for downgrade to Medical Stepdown   --------------------------------------------------------------------------------------------------------------------------------  PAST MEDICAL & SURGICAL HISTORY:  DM (diabetes mellitus)  MDS (myelodysplastic syndrome)  H/O colonoscopy      Allergies    No Known Allergies    Intolerances      Physical Exam: PHYSICAL EXAM:  GENERAL: well built, well nourished  EYES:  PERRLA, conjunctiva and sclera clear, R 3rd and L Vith nerve   NECK: No JVD,   Lungs- Clear yet decreased at bases   HEART: S1S2 normal, no S3, Regular rate and rhythm; No murmurs, rubs, or gallops  ABDOMEN: Soft, Nontender, Nondistended; Bowel sounds present  EXTREMITIES:  2+ Peripheral Pulses, edematous B/UE and LE ; LUE with induration firm, erythematous edema, neurovascular checks intact otherwise  SKIN:  admitted  stage 2  buttocks - on admission - scarring over, now improving   NEURO: lethargic, opens eyes to noxious stimuli, groaning, no comprehensible verbal output, midline gaze with R preference, PERRLA, R UPPER  WD, RLE - TF , LLE - TF and LUE - Min withdrawal        Vital Signs Last 24 Hrs  T(C): 36.6 (10 Nov 2024 20:00), Max: 37.7 (10 Nov 2024 04:00)  T(F): 97.8 (10 Nov 2024 20:00), Max: 99.8 (10 Nov 2024 04:00)  HR: 74 (10 Nov 2024 22:00) (68 - 84)  BP: 139/65 (10 Nov 2024 22:00) (116/57 - 161/67)  BP(mean): 93 (10 Nov 2024 22:00) (82 - 103)  RR: 19 (10 Nov 2024 22:00) (13 - 26)  SpO2: 97% (10 Nov 2024 22:00) (94% - 99%)    Parameters below as of 10 Nov 2024 20:00  Patient On (Oxygen Delivery Method): room air        I&O's Summary    09 Nov 2024 07:01  -  10 Nov 2024 07:00  --------------------------------------------------------  IN: 1740 mL / OUT: 2050 mL / NET: -310 mL    10 Nov 2024 07:01  -  11 Nov 2024 00:54  --------------------------------------------------------  IN: 580 mL / OUT: 1260 mL / NET: -680 mL        LABS:                               7.5    5.64  )-----------( 394      ( 10 Nov 2024 16:42 )             23.4       11-10    142  |  111[H]  |  29[H]  ----------------------------<  162[H]  5.1[H]   |  23  |  0.9    Ca    9.1      10 Nov 2024 22:14  Phos  3.4     11-09  Mg     2.0     11-10    TPro  4.4[L]  /  Alb  2.8[L]  /  TBili  0.4  /  DBili  x   /  AST  21  /  ALT  14  /  AlkPhos  79  11-10              --------------------------------------------------------------------------------------------------------------------------------  ASSESSMENT & PLAN:   · Assessment	  79F w/ PMHx of MDS, DM, HTN, CKD, recent admission for SDH s/p crani, evac and MMA embolization, presents with decreased MS, found to have stable SDH, admitted to NSICU for r/o status epilepticus     #SDH  #R/o Status epilepticus  #HTN  #MDS  #CKD  #Fever  r/o CSD   multifactorial encephalopathy    Plan:    Neurological: SDH   s/p ketamine gtt trial to r/o CSD  - Neuro Checks Q4 NC  CTH stable on 11/1  - MR Brain- without acuity; stable SDH  -  Keppra 750 gram q 12  f/u keppra level from 11/8 (with improved CrCl)- so will maintain same dose   11/4 keppra level was 98 (with Cr 1.5)  vimpat level 4.02-- increased vimpat to 100 mg q12 on 11/8  EEg with triphasic waves, mild improvement of background  c/w amantidine daily and monitor for clinical improvement   - Analgesia/Sedation: Tylenol PRN  - PT/OT  mobility as tolerates   ammonia wnl  obtain B1 and b12 levels testing    Cardiovascular:HTN   - SBP goal: 110 -  <150  pt was diuresed on 11/3  -  lisinopril 20mg q day , labetalol 200mg q 8; resumed home norvasc   prn labetalol/hydralazine   - Echo  TTE Echo Complete w/o Contrast w/ Doppler (10.09.24 @ 12:41) >Normal global left ventricular systolic function. Left ventricular ejection fraction, by visual estimation, is 65 to 70%  - EKG- QTc     Pulmonary:  L NC wean to off  - Wean to FIO2 > 92 %   - HOB 45  - Aspiration precautions  -  Mouth care per protocol    GI: diarrhea- - may be abx-associated (abx completing recently) vs feed related  - s/p PEG 11/12  - Banatrol + metamucil  q 12  - Dysphagia screen- fail   pt not on PPI or H2RA per outpatient records --> off GI ppx   no documented EGD noted   rectal tube 11/3  lactobacillus --> changed to florastor as per nutrition  LBM 11/12  f/u stool studies for diarrhea    /Renal:- AG Met acidosis - MARCO on CKD improving  primafit/bladder scan q 6  - Sodium Goal: 135 - 145  - Replete as needed.  - Mg > 2    Endo: Pre- diabetic   premeal ademalog   - POCT, ISS,  -180  - A1c: 6  - TSH: 2.63    ID: s/p UTI  - MRSA nasal neg   s/p ampicillin for enterococcus UTI completed  - Normothermia    Hematology: MDS/ Leucocytosis  anemia to 6.5, transfusing 1 unit on 11/7 with improved Hb to 8.8-->  7.8--> 7.1 (likely from MDS)  Chronic blood transfusions  - SCDs, chemoppx  anemia likely in setting of MDS   LUE venous doppler for swollen/indurated L forearm- Superficial thrombophlebitis of the left cephalic vein  elevate LUE, topical antiseptic and cold packs  neurovascular checks of LUE q4  cpk normal    MISC:  Stage 2 - buttocks - Zinc 220mg q day + Vit C 250mg q 12;. Wound consult , Rotate q 2 hrs per protocol   PT/OT [x]  CAM-ICU[x]  Family Updated[x]    Lines/Tubes:  PIV: x 2    Code Status:F; f/u with family regarding GOC - they wish for full code and all aggressive measures    Dispo: stable for medical sdu        --------------------------------------------------------------------------------------------------------------------------------  FOR FOLLOW UP:  [ ] s/p PEG 11/12:  ok to restart meds at 1800, restart feeds 11/13  [ ] monitor BL UE phlebitis 2/2 IV infiltration        NSICU  [phone]

## 2024-11-11 NOTE — PROGRESS NOTE ADULT - ASSESSMENT
79F w/ PMHx of MDS, DM, HTN, CKD, recent admission for SDH s/p crani, evac and MMA embolization, presents with decreased MS, found to have stable SDH, admitted to NSICU for r/o status epilepticus     #SDH  #R/o Status epilepticus  #HTN  #MDS  #CKD  #Fever  r/o CSD   multifactorial encephalopathy    Plan:    Neurological: SDH   s/p ketamine gtt trial to r/o CSD  - Neuro Checks Q4 NC  CTH stable on 11/1  - MR Brain- without acuity; stable SDH  -  Keppra 750 gram q 12  f/u keppra level from 11/8 (with improved CrCl)- so will maintain same dose   11/4 keppra level was 98 (with Cr 1.5)  vimpat level 4.02-- increased vimpat to 100 mg q12 on 11/8  EEg with triphasic waves, mild improvement of background  c/w amantidine daily and monitor for clinical improvement   - Analgesia/Sedation: Tylenol PRN  - PT/OT  mobility as tolerates   ammonia wnl  obtain B1 and b12 levels testing    Cardiovascular:HTN   - SBP goal: 110 -  <150  pt was diuresed on 11/3  -  lisinopril 20mg q day , labetalol 200mg q 8; resumed home norvasc   prn labetalol/hydralazine   - Echo  TTE Echo Complete w/o Contrast w/ Doppler (10.09.24 @ 12:41) >Normal global left ventricular systolic function. Left ventricular ejection fraction, by visual estimation, is 65 to 70%  - EKG- QTc     Pulmonary:  L NC wean to off  - Wean to FIO2 > 92 %   - HOB 45  - Aspiration precautions  -  Mouth care per protocol    GI: diarrhea- - may be abx-associated (abx completing recently) vs feed related  - Darren feeding tube for bolus feeds per nutrition- f/u recs  - Banatrol + metamucil  q 12  - Dysphagia screen- fail   pt not on PPI or H2RA per outpatient records --> off GI ppx   no documented EGD noted   rectal tube 11/3  lactobacillus --> changed to florastor as per nutrition  LBM 11/10  will possibly require PEG;   GI consultation  f/u stool studies for diarrhea    /Renal:- AG Met acidosis - MARCO on CKD improving  primafit/bladder scan q 6  - Sodium Goal: 135 - 145  - Replete as needed.  - Mg > 2    Endo: Pre- diabetic   premeal ademalog   - POCT, ISS,  -180  - A1c: 6  - TSH: 2.63    ID: s/p UTI  - MRSA nasal neg   s/p ampicillin for enterococcus UTI completed  - Normothermia    Hematology: MDS/ Leucocytosis  anemia to 6.5, transfusing 1 unit on 11/7 with improved Hb to 8.8-->  7.8--> 7.1 (likely from MDS)  Chronic blood transfusions  - SCDs, chemoppx  anemia likely in setting of MDS   LUE venous doppler for swollen/indurated L forearm- Superficial thrombophlebitis of the left cephalic vein  elevate LUE, topical antiseptic and cold packs  neurovascular checks of LUE q4  cpk normal    MISC:  Stage 2 - buttocks - Zinc 220mg q day + Vit C 250mg q 12;. Wound consult , Rotate q 2 hrs per protocol   PT/OT [x]  CAM-ICU[x]  Family Updated[x]    Lines/Tubes:  PIV: x 2    Code Status:F; f/u with family regarding GOC - they wish for full code and all aggressive measures             79F w/ PMHx of MDS, DM, HTN, CKD, recent admission for SDH s/p crani, evac and MMA embolization, presents with decreased MS, found to have stable SDH, admitted to NSICU for r/o status epilepticus     #SDH  #R/o Status epilepticus  #HTN  #MDS  #CKD  #Fever  r/o CSD   multifactorial encephalopathy    Plan:    Neurological: SDH   s/p ketamine gtt trial to r/o CSD  - Neuro Checks Q4 NC  - CTH stable on 11/1  - MR Brain 11/5 - without acuity; stable SDH  -  Keppra 750 gram q 12  - f/u keppra level from 11/8 (with improved CrCl)- so will maintain same dose - 11/4 keppra level was 98 (with Cr 1.5)  - vimpat level 4.02-- increased vimpat to 100 mg q12 on 11/8  - EEg with triphasic waves, mild improvement of background- unchanged - will D/C   - c/w amantidine 50mg q 12 daily and monitor for clinical improvement   - Analgesia/Sedation: Tylenol PRN  - PT/OT  mobility as tolerates   ammonia wnl    Cardiovascular:HTN   - SBP goal: 110 -  <150  - Lasix 40mg IV X1 - ocampo for 24 Hrs while on lasicx then D/C   -  lisinopril 20mg q day , labetalol 200mg q 8; resumed home norvasc   prn labetalol/hydralazine   - Echo  TTE Echo Complete w/o Contrast w/ Doppler (10.09.24 @ 12:41) >Normal global left ventricular systolic function. Left ventricular ejection fraction, by visual estimation, is 65 to 70%  - EKG- QTc - NL - 452     Pulmonary: RA   - Wean to FIO2 > 92 %   - HOB 45  - Aspiration precautions  -  Mouth care per protocol    GI: diarrhea- - may be abx-associated (abx completing recently) vs feed related  - Darren feeding tube for bolus feeds per nutrition- f/u recs  - Banatrol + metamucil  q 8 started q 8 11/11   - Dysphagia screen- fail   no documented EGD noted   rectal tube 11/3- D/C in am   lactobacillus --> changed to florastor as per nutrition  LBM 11/11  NPO for PEG in am   GI consultation  f/u stool studies for diarrhea    /Renal:- AG Met acidosis - MARCO on CKD improving  primafit/bladder scan q 6  - Sodium Goal: 135 - 145  - Replete as needed.  - Mg > 2    Endo: Pre- diabetic   premeal ademalog   - POCT, ISS,  -180  - A1c: 6  - TSH: 2.63    ID: s/p UTI  - MRSA nasal neg   s/p ampicillin for enterococcus UTI completed  - Normothermia    Hematology: MDS/ Leucocytosis  Coags for PEG in am   anemia to 6.5, transfusing 1 unit on 11/7 with improved Hb to 8.8-->  7.8--> 7.1 (likely from MDS)  Chronic blood transfusions  - SCDs, chemoppx  anemia likely in setting of MDS   LUE venous doppler for swollen/indurated L forearm-   Superficial thrombophlebitis of the left cephalic vein  elevate LUE, topical antiseptic and cold packs  neurovascular checks of LUE q4  cpk normal    MISC:  Stage 2 - buttocks - Zinc 220mg q day + Vit C 250mg q 12;. Wound consult , Rotate q 2 hrs per protocol / L Forearm Phlebitis- erthema / No fever will hold on ABX , elevate L arm and warm compress  PT/OT [x]  CAM-ICU[x]  Family Updated[x]    Lines/Tubes:  PIV: x 2  [place]     Code Status:F; f/u with family regarding GOC - they wish for full code and all aggressive measures

## 2024-11-11 NOTE — EEG REPORT - NS EEG TEXT BOX
Epilepsy Attending Note:     SIMONA KUHN    79y Female  MRN [id number]    Vital Signs Last 24 Hrs  T(C): 36.9 (11 Nov 2024 08:00), Max: 37 (11 Nov 2024 04:00)  T(F): 98.5 (11 Nov 2024 08:00), Max: 98.6 (11 Nov 2024 04:00)  HR: 87 (11 Nov 2024 10:15) (68 - 87)  BP: 151/67 (11 Nov 2024 10:15) (118/55 - 164/65)  BP(mean): 96 (11 Nov 2024 10:15) (79 - 104)  RR: 15 (11 Nov 2024 10:15) (13 - 26)  SpO2: 96% (11 Nov 2024 10:15) (95% - 98%)    Parameters below as of 11 Nov 2024 08:00  Patient On (Oxygen Delivery Method): room air                              7.4    5.42  )-----------( 462      ( 11 Nov 2024 04:30 )             23.2       11-11    143  |  112[H]  |  30[H]  ----------------------------<  161[H]  5.3[H]   |  23  |  0.8    Ca    8.9      11 Nov 2024 04:30  Phos  3.9     11-11  Mg     1.9     11-11    TPro  4.6[L]  /  Alb  3.1[L]  /  TBili  0.4  /  DBili  x   /  AST  15  /  ALT  16  /  AlkPhos  87  11-11      MEDICATIONS  (STANDING):  amantadine Syrup 50 milliGRAM(s) Oral daily  amLODIPine   Tablet 10 milliGRAM(s) Oral daily  ascorbic acid 500 milliGRAM(s) Oral daily  bacitracin   Ointment 1 Application(s) Topical two times a day  bacitracin   Ointment 1 Application(s) Topical two times a day  chlorhexidine 2% Cloths 1 Application(s) Topical <User Schedule>  dextrose 5%. 1000 milliLiter(s) (50 mL/Hr) IV Continuous <Continuous>  dextrose 5%. 1000 milliLiter(s) (100 mL/Hr) IV Continuous <Continuous>  dextrose 50% Injectable 25 Gram(s) IV Push once  dextrose 50% Injectable 12.5 Gram(s) IV Push once  dextrose 50% Injectable 25 Gram(s) IV Push once  glucagon  Injectable 1 milliGRAM(s) IntraMuscular once  heparin   Injectable 5000 Unit(s) SubCutaneous every 12 hours  hydrALAZINE 25 milliGRAM(s) Oral every 8 hours  insulin lispro (ADMELOG) corrective regimen sliding scale   SubCutaneous every 8 hours  insulin lispro Injectable (ADMELOG) 4 Unit(s) SubCutaneous every 8 hours  labetalol 200 milliGRAM(s) Oral every 8 hours  lacosamide Solution 100 milliGRAM(s) Oral every 12 hours  levETIRAcetam  Solution 750 milliGRAM(s) Enteral Tube every 12 hours  psyllium Powder 1 Packet(s) Oral every 12 hours  saccharomyces boulardii 250 milliGRAM(s) Oral two times a day  zinc sulfate 220 milliGRAM(s) Oral daily    MEDICATIONS  (PRN):  dextrose Oral Gel 15 Gram(s) Oral once PRN Blood Glucose LESS THAN 70 milliGRAM(s)/deciliter  hydrALAZINE Injectable 5 milliGRAM(s) IV Push every 2 hours PRN SBP >150  labetalol Injectable 10 milliGRAM(s) IV Push every 2 hours PRN SBP > 150            VEEG in the last 24 hours:    Background-------------  continues, not organized and only in brief segments showing a symmetrical PDR reaching frequencies in the range of 5-6 Hz.                                      For the most part the background is superimposed by frequent to abundant generalized discharges that clearly  and more specifically over the right temporal region appear epileptiform in naturae     Focal and generalized slowing----------1- moderate to severe generalized slowing 2- right hemispheric / FT slowing    Interictal activity------as above    Events-------------    electrographic pattern as above    Seizures--------none    Impression:  abnormal as above    Plan - as/NCC team

## 2024-11-11 NOTE — CHART NOTE - NSCHARTNOTEFT_GEN_A_CORE
Registered Dietitian Follow-Up     Patient Profile Reviewed                           Yes [x]   No []     Nutrition History Previously Obtained        Yes []  No [x]       Pertinent Subjective Information:  RD spoke with RN - patient tolerating tube feeds (no vomiting) - however dignishield remains in place  - 200 mL output from rectal tube at time of visit.      Pertinent Medical Interventions:  79F w/ PMHx of MDS, DM, HTN, CKD, recent admission for SDH s/p crani, evac and MMA embolization, presents with decreased MS, found to have stable SDH, admitted to NSICU for r/o status epilepticus     #SDH  #R/o Status epilepticus  #HTN  #MDS  #CKD  #Fever  r/o CSD   multifactorial encephalopathy    diarrhea - may be abx - associate (abx completed recently) versus  feed related  Banatrol + metamucil q8 starting   rectal tube 11/3 - d/c in am     LBM   NPO for PEG in am   AG met acidosis - MARCO on CKD improving      Diet order:   Diet, NPO after Midnight:      NPO Start Date: 2024,   NPO Start Time: 23:59 (24 @ 12:59) [Active]  Diet, NPO with Tube Feed:   Tube Feeding Modality: Nasogastric  Glucerna 1.2 Ash (GLUCERNARTH)  Total Volume for 24 Hours (mL): 1080  Bolus  Total Volume of Bolus (mL):  360  Total # of Feeds: 3  Tube Feed Frequency: Every 8 hours   Tube Feed Start Time: 14:00  Bolus Feed Rate (mL per Hour): 360   Bolus Feed Duration (in Hours): 40  Banatrol TF     Qty per Day:  3 (24 @ 12:57) [Active]    Anthropometrics:  Height: 155 cm   Weight: 66.4 kg  BMI: 27.6  IBW: 47.7 kg     Daily Weight in k.4 (), Weight in k.6 (11-10), Weight in k.8 (), Weight in k.8 (), Weight in k.5 (), Weight in k (), Weight in k.5 ()  66.4 kg () vs 69.4 kg ()  indicating 4.5% weight gain x 10 days    MEDICATIONS  (STANDING):  amantadine Syrup 50 milliGRAM(s) Oral <User Schedule>  amLODIPine   Tablet 10 milliGRAM(s) Oral daily  ascorbic acid 500 milliGRAM(s) Oral daily  bacitracin   Ointment 1 Application(s) Topical two times a day  bacitracin   Ointment 1 Application(s) Topical two times a day  chlorhexidine 2% Cloths 1 Application(s) Topical <User Schedule>  dextrose 5%. 1000 milliLiter(s) (50 mL/Hr) IV Continuous <Continuous>  dextrose 5%. 1000 milliLiter(s) (100 mL/Hr) IV Continuous <Continuous>  dextrose 50% Injectable 12.5 Gram(s) IV Push once  dextrose 50% Injectable 25 Gram(s) IV Push once  dextrose 50% Injectable 25 Gram(s) IV Push once  glucagon  Injectable 1 milliGRAM(s) IntraMuscular once  heparin   Injectable 5000 Unit(s) SubCutaneous every 12 hours  hydrALAZINE 25 milliGRAM(s) Oral every 8 hours  insulin lispro (ADMELOG) corrective regimen sliding scale   SubCutaneous every 8 hours  insulin lispro Injectable (ADMELOG) 4 Unit(s) SubCutaneous every 8 hours  labetalol 200 milliGRAM(s) Oral every 8 hours  lacosamide Solution 100 milliGRAM(s) Oral every 12 hours  levETIRAcetam  Solution 750 milliGRAM(s) Enteral Tube every 12 hours  psyllium Powder 1 Packet(s) Oral every 8 hours  saccharomyces boulardii 250 milliGRAM(s) Oral two times a day  zinc sulfate 220 milliGRAM(s) Oral daily    MEDICATIONS  (PRN):  dextrose Oral Gel 15 Gram(s) Oral once PRN Blood Glucose LESS THAN 70 milliGRAM(s)/deciliter  hydrALAZINE Injectable 5 milliGRAM(s) IV Push every 2 hours PRN SBP >150  labetalol Injectable 10 milliGRAM(s) IV Push every 2 hours PRN SBP > 150    Pertinent Labs:  @ 11:52: Na 142, BUN 30[H], Cr 0.9, [H], K+ 5.1[H], Phos --, Mg --, Alk Phos --, ALT/SGPT --, AST/SGOT --, HbA1c --   @ 04:30: Na 143, BUN 30[H], Cr 0.8, [H], K+ 5.3[H], Phos 3.9, Mg 1.9, Alk Phos 87, ALT/SGPT 16, AST/SGOT 15, HbA1c --  11-10 @ 22:14: Na 142, BUN 29[H], Cr 0.9, [H], K+ 5.1[H], Phos --, Mg --, Alk Phos --, ALT/SGPT --, AST/SGOT --, HbA1c --  11-10 @ 16:42: Na 142, BUN 28[H], Cr 0.9, [H], K+ 5.2[H], Phos --, Mg --, Alk Phos --, ALT/SGPT --, AST/SGOT --, HbA1c --    Finger Sticks:  POCT Blood Glucose.: 116 mg/dL ( @ 14:41)  POCT Blood Glucose.: 138 mg/dL ( @ 08:58)  POCT Blood Glucose.: 150 mg/dL ( @ 05:14)  POCT Blood Glucose.: 144 mg/dL (11-10 @ 21:04)    Physical Findings:  - Appearance: unable to speak; NGT in place  - GI function:  - dignishield output 450 mL total at this time  - Tubes: + NGT  - Oral/Mouth cavity: NPO with EN via NGT  - Skin: Per WOCN note  - stage 2 pressure injury to coccyx  - Edema: 2+ edema - left and right wrist, left and right hand, left and right arm      Nutrition Requirements:  Weight Used: 66.4 kg dosing weight per initial RD assessment - with consideration for age, BMI, edema, acuity of illness, stage 2 pressure injury to coccyx     Estimated Energy Needs    Continue [x]  Adjust []  3727-4240 kcal/day (25-30 kcal/kg)     Estimated Protein Needs    Continue [x]  Adjust []   g/day (1.2-1.5 g/kg)     Estimated Fluid Needs        Continue [x]  Adjust []  4556-1929 mL/day (20-25 mL/kg)     Nutrient Intake:   Current EN regimen provides:  1080 mL total volume, 1296 kcal, 64.8 gm Protein, 875 mL free water from formula - no additional water flushes       [x] Previous Nutrition Diagnosis:  Inadequate Protein Energy Intake             [x] Ongoing          [] Resolved    Nutrition Education: deferred     Nutrition Intervention:  EN recs, coordination of care     Goal/Expected Outcome:   EN to meet >85% of estimated needs within 3-5 days      Indicator/Monitoring:   EN tolerance, BM, GI s/s, weight, labs, skin status, NFPF      Recommendation:   1) As patient planned for PEG placement, would recommend bolus feeds with the following regimen:    Glucerna 1.2 formula starting at 120 mL q6hrs over 1 hour, then advance by 120 mL the next day, and on day 3 advance to goal rate of 360 mL q6hrs    EN at goal rate will provide:  1440 mL total volume, 1728 kcal, 86.4 gm Protein, 1166.4 mL free water from formula + recommend 75 mL water flushes pre/post feeds = 1766.4 mL total water (or per NSICU team)    2) Monitor electrolytes, BG, renal profile - continue monitor Mg, K+ and Phos and replete PRN    3) Monitor BM/GI s/s - continue with Banatrol 3x/day and Metamucil q8hrs as ordered   Stool studies noted - continue saccharomyces boulardii as ordered     Patient is at high nutrition risk, RD to f/u   RD to remain available: Mercedes Forrester RD [phone] or via Teams

## 2024-11-11 NOTE — PROGRESS NOTE ADULT - SUBJECTIVE AND OBJECTIVE BOX
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 While in NSICU, patient underwent MMA embolization and post operatively was stable, however, on 10/16 she was noted to have leaking from her crani site. STAT CTH showed increase in SDH and extracalvarial collection, NSG removed 25cc of blood and was re-sutured. Patient was then downgraded to the floor, was started on zosyn for UTI, and was discharged to short term rehab on 10/27. Today, patient was more lethargic and presented to the ED for evaluation. CTH showing stable SDH, CTA negative for acute stroke. NSx and NSICU consulted for management.    o/n-  eeg with triphasics, some improved background       REVIEW OF SYSTEMS: [X ] Unable to Assess due to neurologic exam     Neuro: [ ] Headache [ ] Back pain [ ] Numbness [ ] Weakness [ ] Ataxia [ ] Dizziness [ ] Aphasia [ ] Dysarthria [ ] Visual disturbance  Resp: [ ] Shortness of breath/dyspnea, [ ] Orthopnea [ ] Cough  CV: [ ] Chest pain [ ] Palpitation [ ] Lightheadedness [ ] Syncope  Renal: [ ] Thirst [ ] Edema  GI: [ ] Nausea [ ] Emesis [ ] Abdominal pain [ ] Constipation [ ] Diarrhea  Hem: [ ] Hematemesis [ ] bright red blood per rectum  ID: [ ] Fever [ ] Chills [ ] Dysuria  ENT: [ ] Rhinorrhea          ICU Vital Signs Last 24 Hrs  T(C): 37 (11 Nov 2024 04:00), Max: 37.1 (10 Nov 2024 08:00)  T(F): 98.6 (11 Nov 2024 04:00), Max: 98.8 (10 Nov 2024 08:00)  HR: 73 (11 Nov 2024 06:00) (68 - 84)  BP: 120/56 (11 Nov 2024 06:00) (116/57 - 161/67)  BP(mean): 81 (11 Nov 2024 06:00) (81 - 104)  ABP: --  ABP(mean): --  RR: 16 (11 Nov 2024 06:00) (13 - 26)  SpO2: 95% (11 Nov 2024 06:00) (94% - 98%)    O2 Parameters below as of 11 Nov 2024 04:00  Patient On (Oxygen Delivery Method): room air          10 Nov 2024 07:01  -  11 Nov 2024 07:00  --------------------------------------------------------  IN:    Enteral Tube Flush: 100 mL    Glucerna: 1200 mL    Oral Fluid: 595 mL  Total IN: 1895 mL    OUT:    Intermittent Catheterization - Urethral (mL): 1060 mL    Rectal Tube (mL): 800 mL  Total OUT: 1860 mL    Total NET: 35 mL      MEDICATIONS  (STANDING):  amantadine Syrup 50 milliGRAM(s) Oral daily  amLODIPine   Tablet 10 milliGRAM(s) Oral daily  ascorbic acid 500 milliGRAM(s) Oral daily  bacitracin   Ointment 1 Application(s) Topical two times a day  bacitracin   Ointment 1 Application(s) Topical two times a day  chlorhexidine 2% Cloths 1 Application(s) Topical <User Schedule>  dextrose 5%. 1000 milliLiter(s) (50 mL/Hr) IV Continuous <Continuous>  dextrose 5%. 1000 milliLiter(s) (100 mL/Hr) IV Continuous <Continuous>  dextrose 50% Injectable 12.5 Gram(s) IV Push once  dextrose 50% Injectable 25 Gram(s) IV Push once  dextrose 50% Injectable 25 Gram(s) IV Push once  glucagon  Injectable 1 milliGRAM(s) IntraMuscular once  heparin   Injectable 5000 Unit(s) SubCutaneous every 12 hours  hydrALAZINE 25 milliGRAM(s) Oral every 8 hours  insulin lispro (ADMELOG) corrective regimen sliding scale   SubCutaneous every 8 hours  insulin lispro Injectable (ADMELOG) 4 Unit(s) SubCutaneous every 8 hours  labetalol 200 milliGRAM(s) Oral every 8 hours  lacosamide Solution 100 milliGRAM(s) Oral every 12 hours  levETIRAcetam  Solution 750 milliGRAM(s) Enteral Tube every 12 hours  magnesium sulfate  IVPB 2 Gram(s) IV Intermittent once  psyllium Powder 1 Packet(s) Oral every 12 hours  saccharomyces boulardii 250 milliGRAM(s) Oral two times a day  zinc sulfate 220 milliGRAM(s) Oral daily        11-11    143  |  112[H]  |  30[H]  ----------------------------<  161[H]  5.3[H]   |  23  |  0.8    Ca    8.9      11 Nov 2024 04:30  Phos  3.9     11-11  Mg     1.9     11-11    TPro  4.6[L]  /  Alb  3.1[L]  /  TBili  0.4  /  DBili  x   /  AST  15  /  ALT  16  /  AlkPhos  87  11-11                          7.4    5.42  )-----------( 462      ( 11 Nov 2024 04:30 )             23.2            Physical Exam: PHYSICAL EXAM:  GENERAL: well built, well nourished  EYES:  PERRLA, conjunctiva and sclera clear, R 3rd and L Vith nerve   NECK: No JVD,   Lungs- Clear yet decreased at bases   HEART: S1S2 normal, no S3, Regular rate and rhythm; No murmurs, rubs, or gallops  ABDOMEN: Soft, Nontender, Nondistended; Bowel sounds present  EXTREMITIES:  2+ Peripheral Pulses, edematous B/UE and LE ; LUE with induration firm, erythematous edema, neurovascular checks intact otherwise  SKIN:  admitted  stage 2  buttocks - on admission - scarring over, now improving   NEURO: lethargic, opens eyes to noxious stimuli, groaning, no comprehensible verbal output, midline gaze with R preference, PERRLA, R UPPER  WD, RLE - TF , LLE - TF and LUE - Min withdrawal     79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 While in NSICU, patient underwent MMA embolization and post operatively was stable, however, on 10/16 she was noted to have leaking from her crani site. STAT CTH showed increase in SDH and extracalvarial collection, NSG removed 25cc of blood and was re-sutured. Patient was then downgraded to the floor, was started on zosyn for UTI, and was discharged to short term rehab on 10/27. Today, patient was more lethargic and presented to the ED for evaluation. CTH showing stable SDH, CTA negative for acute stroke. NSx and NSICU consulted for management.    o/n-  eeg with triphasics, some improved background       REVIEW OF SYSTEMS: [X ] Unable to Assess due to neurologic exam     Neuro: [ ] Headache [ ] Back pain [ ] Numbness [ ] Weakness [ ] Ataxia [ ] Dizziness [ ] Aphasia [ ] Dysarthria [ ] Visual disturbance  Resp: [ ] Shortness of breath/dyspnea, [ ] Orthopnea [ ] Cough  CV: [ ] Chest pain [ ] Palpitation [ ] Lightheadedness [ ] Syncope  Renal: [ ] Thirst [ ] Edema  GI: [ ] Nausea [ ] Emesis [ ] Abdominal pain [ ] Constipation [ ] Diarrhea  Hem: [ ] Hematemesis [ ] bright red blood per rectum  ID: [ ] Fever [ ] Chills [ ] Dysuria  ENT: [ ] Rhinorrhea          ICU Vital Signs Last 24 Hrs  T(C): 37 (11 Nov 2024 04:00), Max: 37.1 (10 Nov 2024 08:00)  T(F): 98.6 (11 Nov 2024 04:00), Max: 98.8 (10 Nov 2024 08:00)  HR: 73 (11 Nov 2024 06:00) (68 - 84)  BP: 120/56 (11 Nov 2024 06:00) (116/57 - 161/67)  BP(mean): 81 (11 Nov 2024 06:00) (81 - 104)  RR: 16 (11 Nov 2024 06:00) (13 - 26)  SpO2: 95% (11 Nov 2024 06:00) (94% - 98%)    O2 Parameters below as of 11 Nov 2024 04:00  Patient On (Oxygen Delivery Method): room air          10 Nov 2024 07:01  -  11 Nov 2024 07:00  --------------------------------------------------------  IN:    Enteral Tube Flush: 100 mL    Glucerna: 1200 mL    Oral Fluid: 595 mL  Total IN: 1895 mL    OUT:    Intermittent Catheterization - Urethral (mL): 1060 mL    Rectal Tube (mL): 800 mL  Total OUT: 1860 mL    Total NET: 35 mL      MEDICATIONS  (STANDING):  amantadine Syrup 50 milliGRAM(s) Oral daily  amLODIPine   Tablet 10 milliGRAM(s) Oral daily  ascorbic acid 500 milliGRAM(s) Oral daily  bacitracin   Ointment 1 Application(s) Topical two times a day  bacitracin   Ointment 1 Application(s) Topical two times a day  chlorhexidine 2% Cloths 1 Application(s) Topical <User Schedule>  dextrose 5%. 1000 milliLiter(s) (50 mL/Hr) IV Continuous <Continuous>  dextrose 5%. 1000 milliLiter(s) (100 mL/Hr) IV Continuous <Continuous>  dextrose 50% Injectable 12.5 Gram(s) IV Push once  dextrose 50% Injectable 25 Gram(s) IV Push once  dextrose 50% Injectable 25 Gram(s) IV Push once  glucagon  Injectable 1 milliGRAM(s) IntraMuscular once  heparin   Injectable 5000 Unit(s) SubCutaneous every 12 hours  hydrALAZINE 25 milliGRAM(s) Oral every 8 hours  insulin lispro (ADMELOG) corrective regimen sliding scale   SubCutaneous every 8 hours  insulin lispro Injectable (ADMELOG) 4 Unit(s) SubCutaneous every 8 hours  labetalol 200 milliGRAM(s) Oral every 8 hours  lacosamide Solution 100 milliGRAM(s) Oral every 12 hours  levETIRAcetam  Solution 750 milliGRAM(s) Enteral Tube every 12 hours  magnesium sulfate  IVPB 2 Gram(s) IV Intermittent once  psyllium Powder 1 Packet(s) Oral every 12 hours  saccharomyces boulardii 250 milliGRAM(s) Oral two times a day  zinc sulfate 220 milliGRAM(s) Oral daily        11-11    143  |  112[H]  |  30[H]  ----------------------------<  161[H]  5.3[H]   |  23  |  0.8    Ca    8.9      11 Nov 2024 04:30  Phos  3.9     11-11  Mg     1.9     11-11    TPro  4.6[L]  /  Alb  3.1[L]  /  TBili  0.4  /  DBili  x   /  AST  15  /  ALT  16  /  AlkPhos  87  11-11                          7.4   ( 11/ /7/24 )   5.42  )-----------( 462      ( 11 Nov 2024 04:30 )             23.2      VEEG in the last 24 hours:    Background-------------  continues, not organized and only in brief segments showing a symmetrical PDR reaching frequencies in the range of 5-6 Hz.                                      For the most part the background is superimposed by frequent to abundant generalized discharges that clearly  and more specifically over the right temporal region appear epileptiform in naturae     Focal and generalized slowing----------1- moderate to severe generalized slowing 2- right hemispheric / FT slowing    Interictal activity------as above    Events-------------    electrographic pattern as above    Seizures--------none    Impression:  abnormal as above    Plan - as/NCC team      Physical Exam: PHYSICAL EXAM:  GENERAL: well built, well nourished  EYES:  PERRLA, conjunctiva and sclera clear, R 3rd and L Vith nerve   NECK: No JVD,   Lungs- Clear yet decreased at bases   HEART: S1S2 normal, no S3, Regular rate and rhythm; No murmurs, rubs, or gallops  ABDOMEN: Soft, Nontender, Nondistended; Bowel sounds present  EXTREMITIES:  2+ Peripheral Pulses, edematous B/UE and LE ; LUE with induration firm, erythematous edema, neurovascular checks intact otherwise  SKIN:  admitted  stage 2  buttocks - on admission - scarring over, now improving   NEURO: lethargic, opens eyes to noxious stimuli, groaning, no comprehensible verbal output, midline gaze with R preference, PERRLA, R UPPER  WD, RLE - TF , LLE - TF and LUE - Min withdrawal

## 2024-11-12 ENCOUNTER — APPOINTMENT (OUTPATIENT)
Age: 79
End: 2024-11-12

## 2024-11-12 ENCOUNTER — TRANSCRIPTION ENCOUNTER (OUTPATIENT)
Age: 79
End: 2024-11-12

## 2024-11-12 LAB
ALBUMIN SERPL ELPH-MCNC: 3.3 G/DL — LOW (ref 3.5–5.2)
ALP SERPL-CCNC: 85 U/L — SIGNIFICANT CHANGE UP (ref 30–115)
ALT FLD-CCNC: 17 U/L — SIGNIFICANT CHANGE UP (ref 0–41)
ANION GAP SERPL CALC-SCNC: 7 MMOL/L — SIGNIFICANT CHANGE UP (ref 7–14)
APTT BLD: 34.7 SEC — SIGNIFICANT CHANGE UP (ref 27–39.2)
AST SERPL-CCNC: 18 U/L — SIGNIFICANT CHANGE UP (ref 0–41)
BILIRUB SERPL-MCNC: 0.5 MG/DL — SIGNIFICANT CHANGE UP (ref 0.2–1.2)
BUN SERPL-MCNC: 32 MG/DL — HIGH (ref 10–20)
CALCIUM SERPL-MCNC: 9 MG/DL — SIGNIFICANT CHANGE UP (ref 8.4–10.4)
CHLORIDE SERPL-SCNC: 110 MMOL/L — SIGNIFICANT CHANGE UP (ref 98–110)
CO2 SERPL-SCNC: 24 MMOL/L — SIGNIFICANT CHANGE UP (ref 17–32)
CREAT SERPL-MCNC: 0.9 MG/DL — SIGNIFICANT CHANGE UP (ref 0.7–1.5)
EGFR: 65 ML/MIN/1.73M2 — SIGNIFICANT CHANGE UP
GLUCOSE BLDC GLUCOMTR-MCNC: 111 MG/DL — HIGH (ref 70–99)
GLUCOSE BLDC GLUCOMTR-MCNC: 131 MG/DL — HIGH (ref 70–99)
GLUCOSE BLDC GLUCOMTR-MCNC: 94 MG/DL — SIGNIFICANT CHANGE UP (ref 70–99)
GLUCOSE SERPL-MCNC: 75 MG/DL — SIGNIFICANT CHANGE UP (ref 70–99)
INR BLD: 1.01 RATIO — SIGNIFICANT CHANGE UP (ref 0.65–1.3)
LEVETIRACETAM SERPL-MCNC: 58.8 UG/ML — HIGH (ref 10–40)
LEVETIRACETAM SERPL-MCNC: 65.2 UG/ML — HIGH (ref 10–40)
MAGNESIUM SERPL-MCNC: 2.2 MG/DL — SIGNIFICANT CHANGE UP (ref 1.8–2.4)
OSMOLALITY STL: 349 MOSM/KG — SIGNIFICANT CHANGE UP
PH UR: 5.5 — SIGNIFICANT CHANGE UP (ref 5–8)
PHOSPHATE SERPL-MCNC: 4.8 MG/DL — SIGNIFICANT CHANGE UP (ref 2.1–4.9)
POTASSIUM SERPL-MCNC: 5.2 MMOL/L — HIGH (ref 3.5–5)
POTASSIUM SERPL-SCNC: 5.2 MMOL/L — HIGH (ref 3.5–5)
PROT SERPL-MCNC: 4.8 G/DL — LOW (ref 6–8)
PROTHROM AB SERPL-ACNC: 11.9 SEC — SIGNIFICANT CHANGE UP (ref 9.95–12.87)
SODIUM SERPL-SCNC: 141 MMOL/L — SIGNIFICANT CHANGE UP (ref 135–146)

## 2024-11-12 PROCEDURE — 99232 SBSQ HOSP IP/OBS MODERATE 35: CPT

## 2024-11-12 PROCEDURE — 43246 EGD PLACE GASTROSTOMY TUBE: CPT

## 2024-11-12 RX ORDER — LEVETIRACETAM 1000 MG/1
750 TABLET ORAL EVERY 12 HOURS
Refills: 0 | Status: DISCONTINUED | OUTPATIENT
Start: 2024-11-12 | End: 2024-11-14

## 2024-11-12 RX ORDER — ACETAMINOPHEN 500MG 500 MG/1
1000 TABLET, COATED ORAL ONCE
Refills: 0 | Status: DISCONTINUED | OUTPATIENT
Start: 2024-11-12 | End: 2024-11-12

## 2024-11-12 RX ORDER — ACETAMINOPHEN 500MG 500 MG/1
650 TABLET, COATED ORAL EVERY 6 HOURS
Refills: 0 | Status: DISCONTINUED | OUTPATIENT
Start: 2024-11-12 | End: 2024-11-20

## 2024-11-12 RX ADMIN — HYDRALAZINE HYDROCHLORIDE 25 MILLIGRAM(S): 10 TABLET ORAL at 21:57

## 2024-11-12 RX ADMIN — Medication 5000 UNIT(S): at 18:30

## 2024-11-12 RX ADMIN — ACETAMINOPHEN 500MG 650 MILLIGRAM(S): 500 TABLET, COATED ORAL at 22:14

## 2024-11-12 RX ADMIN — LABETALOL 200 MILLIGRAM(S): 100 TABLET, FILM COATED ORAL at 21:57

## 2024-11-12 RX ADMIN — CHLORHEXIDINE GLUCONATE 1 APPLICATION(S): 1.2 RINSE ORAL at 05:32

## 2024-11-12 RX ADMIN — LABETALOL 10 MILLIGRAM(S): 100 TABLET, FILM COATED ORAL at 12:36

## 2024-11-12 RX ADMIN — Medication 1 APPLICATION(S): at 05:32

## 2024-11-12 RX ADMIN — LEVETIRACETAM 750 MILLIGRAM(S): 1000 TABLET ORAL at 18:30

## 2024-11-12 RX ADMIN — LEVETIRACETAM 750 MILLIGRAM(S): 1000 TABLET ORAL at 05:31

## 2024-11-12 RX ADMIN — Medication 1 APPLICATION(S): at 05:33

## 2024-11-12 RX ADMIN — ACETAMINOPHEN 500MG 650 MILLIGRAM(S): 500 TABLET, COATED ORAL at 21:57

## 2024-11-12 RX ADMIN — Medication 250 MILLIGRAM(S): at 18:36

## 2024-11-12 RX ADMIN — LABETALOL 10 MILLIGRAM(S): 100 TABLET, FILM COATED ORAL at 08:21

## 2024-11-12 NOTE — CHART NOTE - NSCHARTNOTEFT_GEN_A_CORE
PACU ANESTHESIA ADMISSION NOTE      Procedure:   Post op diagnosis:      ____  Intubated  TV:______       Rate: ______      FiO2: ______    __x__  Patent Airway    __x__  Full return of protective reflexes    __x__  Full recovery from anesthesia / back to baseline     Vitals:   T: 37.5          R: 18                 BP: 130/50                 Sat: 96                  P: 91      Mental Status:  __x__ Awake   ___x__ Alert   _____ Drowsy   _____ Sedated    Nausea/Vomiting:  _x___ NO  ______Yes,   See Post - Op Orders          Pain Scale (0-10):  _____    Treatment: __x__ None    ____ See Post - Op/PCA Orders    Post - Operative Fluids:   ____ Oral   ___x_ See Post - Op Orders    Plan: Discharge:   ____Home       _____Floor     ___x__Critical Care    _____  Other:_________________    Comments:    Uneventful anesthesia. Patient transported to  spontaneously breathing and hemodynamically stable.

## 2024-11-12 NOTE — CHART NOTE - NSCHARTNOTEFT_GEN_A_CORE
s/p EGD with PEG placement     recs:  can use for meds in 4 hours  can use for feeds in am  flush before and after each use  keep abdominal binder at all times

## 2024-11-13 LAB
ALBUMIN SERPL ELPH-MCNC: 3 G/DL — LOW (ref 3.5–5.2)
ALP SERPL-CCNC: 80 U/L — SIGNIFICANT CHANGE UP (ref 30–115)
ALT FLD-CCNC: 14 U/L — SIGNIFICANT CHANGE UP (ref 0–41)
ANION GAP SERPL CALC-SCNC: 8 MMOL/L — SIGNIFICANT CHANGE UP (ref 7–14)
ANISOCYTOSIS BLD QL: SLIGHT — SIGNIFICANT CHANGE UP
AST SERPL-CCNC: 20 U/L — SIGNIFICANT CHANGE UP (ref 0–41)
BASOPHILS # BLD AUTO: 0.08 K/UL — SIGNIFICANT CHANGE UP (ref 0–0.2)
BASOPHILS NFR BLD AUTO: 0.9 % — SIGNIFICANT CHANGE UP (ref 0–1)
BILIRUB SERPL-MCNC: 0.5 MG/DL — SIGNIFICANT CHANGE UP (ref 0.2–1.2)
BUN SERPL-MCNC: 33 MG/DL — HIGH (ref 10–20)
CALCIUM SERPL-MCNC: 8.4 MG/DL — SIGNIFICANT CHANGE UP (ref 8.4–10.4)
CHLORIDE SERPL-SCNC: 111 MMOL/L — HIGH (ref 98–110)
CO2 SERPL-SCNC: 23 MMOL/L — SIGNIFICANT CHANGE UP (ref 17–32)
CREAT SERPL-MCNC: 1.1 MG/DL — SIGNIFICANT CHANGE UP (ref 0.7–1.5)
EGFR: 51 ML/MIN/1.73M2 — LOW
EOSINOPHIL # BLD AUTO: 0 K/UL — SIGNIFICANT CHANGE UP (ref 0–0.7)
EOSINOPHIL NFR BLD AUTO: 0 % — SIGNIFICANT CHANGE UP (ref 0–8)
GIANT PLATELETS BLD QL SMEAR: PRESENT — SIGNIFICANT CHANGE UP
GLUCOSE BLDC GLUCOMTR-MCNC: 106 MG/DL — HIGH (ref 70–99)
GLUCOSE BLDC GLUCOMTR-MCNC: 125 MG/DL — HIGH (ref 70–99)
GLUCOSE BLDC GLUCOMTR-MCNC: 126 MG/DL — HIGH (ref 70–99)
GLUCOSE BLDC GLUCOMTR-MCNC: 174 MG/DL — HIGH (ref 70–99)
GLUCOSE BLDC GLUCOMTR-MCNC: 50 MG/DL — CRITICAL LOW (ref 70–99)
GLUCOSE BLDC GLUCOMTR-MCNC: 52 MG/DL — CRITICAL LOW (ref 70–99)
GLUCOSE BLDC GLUCOMTR-MCNC: 55 MG/DL — LOW (ref 70–99)
GLUCOSE SERPL-MCNC: 91 MG/DL — SIGNIFICANT CHANGE UP (ref 70–99)
HCT VFR BLD CALC: 22.6 % — LOW (ref 37–47)
HGB BLD-MCNC: 7.2 G/DL — LOW (ref 12–16)
LYMPHOCYTES # BLD AUTO: 0.6 K/UL — LOW (ref 1.2–3.4)
LYMPHOCYTES # BLD AUTO: 7 % — LOW (ref 20.5–51.1)
MAGNESIUM SERPL-MCNC: 2 MG/DL — SIGNIFICANT CHANGE UP (ref 1.8–2.4)
MANUAL SMEAR VERIFICATION: SIGNIFICANT CHANGE UP
MCHC RBC-ENTMCNC: 29.5 PG — SIGNIFICANT CHANGE UP (ref 27–31)
MCHC RBC-ENTMCNC: 31.9 G/DL — LOW (ref 32–37)
MCV RBC AUTO: 92.6 FL — SIGNIFICANT CHANGE UP (ref 81–99)
METAMYELOCYTES # FLD: 0.9 % — HIGH (ref 0–0)
MICROCYTES BLD QL: SLIGHT — SIGNIFICANT CHANGE UP
MONOCYTES # BLD AUTO: 0.14 K/UL — SIGNIFICANT CHANGE UP (ref 0.1–0.6)
MONOCYTES NFR BLD AUTO: 1.7 % — SIGNIFICANT CHANGE UP (ref 1.7–9.3)
NEUTROPHILS # BLD AUTO: 7.61 K/UL — HIGH (ref 1.4–6.5)
NEUTROPHILS NFR BLD AUTO: 89.5 % — HIGH (ref 42.2–75.2)
OVALOCYTES BLD QL SMEAR: SLIGHT — SIGNIFICANT CHANGE UP
PHOSPHATE SERPL-MCNC: 5.4 MG/DL — HIGH (ref 2.1–4.9)
PLAT MORPH BLD: NORMAL — SIGNIFICANT CHANGE UP
PLATELET # BLD AUTO: 369 K/UL — SIGNIFICANT CHANGE UP (ref 130–400)
PMV BLD: SIGNIFICANT CHANGE UP (ref 7.4–10.4)
POIKILOCYTOSIS BLD QL AUTO: SLIGHT — SIGNIFICANT CHANGE UP
POLYCHROMASIA BLD QL SMEAR: SLIGHT — SIGNIFICANT CHANGE UP
POTASSIUM SERPL-MCNC: 5.2 MMOL/L — HIGH (ref 3.5–5)
POTASSIUM SERPL-SCNC: 5.2 MMOL/L — HIGH (ref 3.5–5)
PROT SERPL-MCNC: 4.5 G/DL — LOW (ref 6–8)
RBC # BLD: 2.44 M/UL — LOW (ref 4.2–5.4)
RBC # FLD: 15.2 % — HIGH (ref 11.5–14.5)
RBC BLD AUTO: ABNORMAL
SODIUM SERPL-SCNC: 142 MMOL/L — SIGNIFICANT CHANGE UP (ref 135–146)
VIT B1 SERPL-MCNC: 113.8 NMOL/L — SIGNIFICANT CHANGE UP (ref 66.5–200)
WBC # BLD: 8.5 K/UL — SIGNIFICANT CHANGE UP (ref 4.8–10.8)
WBC # FLD AUTO: 8.5 K/UL — SIGNIFICANT CHANGE UP (ref 4.8–10.8)

## 2024-11-13 PROCEDURE — 95816 EEG AWAKE AND DROWSY: CPT | Mod: 26

## 2024-11-13 PROCEDURE — 99233 SBSQ HOSP IP/OBS HIGH 50: CPT

## 2024-11-13 PROCEDURE — 99232 SBSQ HOSP IP/OBS MODERATE 35: CPT

## 2024-11-13 RX ADMIN — HYDRALAZINE HYDROCHLORIDE 25 MILLIGRAM(S): 10 TABLET ORAL at 06:02

## 2024-11-13 RX ADMIN — Medication 220 MILLIGRAM(S): at 11:44

## 2024-11-13 RX ADMIN — Medication 1 PACKET(S): at 22:10

## 2024-11-13 RX ADMIN — Medication 5000 UNIT(S): at 17:23

## 2024-11-13 RX ADMIN — Medication 500 MILLIGRAM(S): at 11:44

## 2024-11-13 RX ADMIN — Medication 1 APPLICATION(S): at 17:24

## 2024-11-13 RX ADMIN — LEVETIRACETAM 750 MILLIGRAM(S): 1000 TABLET ORAL at 17:23

## 2024-11-13 RX ADMIN — LABETALOL 200 MILLIGRAM(S): 100 TABLET, FILM COATED ORAL at 14:32

## 2024-11-13 RX ADMIN — Medication 5000 UNIT(S): at 06:02

## 2024-11-13 RX ADMIN — LACOSAMIDE 100 MILLIGRAM(S): 10 INJECTION INTRAVENOUS at 08:42

## 2024-11-13 RX ADMIN — LABETALOL 200 MILLIGRAM(S): 100 TABLET, FILM COATED ORAL at 06:02

## 2024-11-13 RX ADMIN — CHLORHEXIDINE GLUCONATE 1 APPLICATION(S): 1.2 RINSE ORAL at 06:05

## 2024-11-13 RX ADMIN — Medication 250 MILLIGRAM(S): at 17:25

## 2024-11-13 RX ADMIN — LACOSAMIDE 100 MILLIGRAM(S): 10 INJECTION INTRAVENOUS at 17:24

## 2024-11-13 RX ADMIN — LEVETIRACETAM 750 MILLIGRAM(S): 1000 TABLET ORAL at 06:02

## 2024-11-13 RX ADMIN — Medication 2: at 14:31

## 2024-11-13 RX ADMIN — LABETALOL 200 MILLIGRAM(S): 100 TABLET, FILM COATED ORAL at 22:10

## 2024-11-13 RX ADMIN — Medication 1 APPLICATION(S): at 06:03

## 2024-11-13 RX ADMIN — AMLODIPINE BESYLATE 10 MILLIGRAM(S): 10 TABLET ORAL at 06:01

## 2024-11-13 RX ADMIN — Medication 1 PACKET(S): at 14:31

## 2024-11-13 RX ADMIN — Medication 50 MILLIGRAM(S): at 14:31

## 2024-11-13 RX ADMIN — HYDRALAZINE HYDROCHLORIDE 25 MILLIGRAM(S): 10 TABLET ORAL at 22:10

## 2024-11-13 RX ADMIN — Medication 25 MILLILITER(S): at 22:49

## 2024-11-13 RX ADMIN — HYDRALAZINE HYDROCHLORIDE 25 MILLIGRAM(S): 10 TABLET ORAL at 14:32

## 2024-11-13 RX ADMIN — Medication 250 MILLIGRAM(S): at 06:02

## 2024-11-13 RX ADMIN — Medication 50 MILLIGRAM(S): at 06:02

## 2024-11-13 RX ADMIN — LACOSAMIDE 100 MILLIGRAM(S): 10 INJECTION INTRAVENOUS at 06:04

## 2024-11-13 NOTE — PROGRESS NOTE ADULT - SUBJECTIVE AND OBJECTIVE BOX
NUTRITION SUPPORT TEAM  -  PROGRESS NOTE     Interval Events:  s/p GT placement 11/12  pt awake, tracks but not verbal and does not follow commands  abd soft, GT in place, no leak or erythema  anicteric, skin turgor fair  + loose brown BM when pt seen - several smaller loose BMs throughout the day today - dignishield out    VITALS:  T(F): 97.9 (11-13 @ 16:13), Max: 97.9 (11-13 @ 07:37)  HR: 75 (11-13 @ 16:13) (71 - 83)  BP: 114/53 (11-13 @ 16:13) (114/53 - 127/59)  RR: 18 (11-13 @ 16:13) (18 - 18)  SpO2: 99% (11-13 @ 16:13) (99% - 99%)    HEIGHT/WEIGHT/BMI:   Height (cm): 162.6 (11-12), 162.6 (10-09), 157.5 (05-23), 157.5 (03-21)  Weight (kg): 66.4 (11-12), 64.3 (10-09), 61.2 (05-23), 63 (02-01)  BMI (kg/m2): 25.1 (11-12), 24.3 (10-09), 24.7 (05-23), 25.4 (03-21)    I/Os: incomplete documentation  11-12-24 @ 07:01  -  11-13-24 @ 07:00  --------------------------------------------------------  IN:  Total IN: 0 mL  OUT:    Enteral Tube Flush: 0 mL    Glucerna: 0 mL    Indwelling Catheter - Urethral (mL): 175 mL    Rectal Tube (mL): 200 mL    Voided (mL): 200 mL  Total OUT: 575 mL  Total NET: -575 mL    STANDING MEDICATIONS:   amantadine Syrup 50 milliGRAM(s) Oral <User Schedule>  amLODIPine   Tablet 10 milliGRAM(s) Oral daily  ascorbic acid 500 milliGRAM(s) Oral daily  bacitracin   Ointment 1 Application(s) Topical two times a day  bacitracin   Ointment 1 Application(s) Topical two times a day  chlorhexidine 2% Cloths 1 Application(s) Topical <User Schedule>  heparin   Injectable 5000 Unit(s) SubCutaneous every 12 hours  hydrALAZINE 25 milliGRAM(s) Oral every 8 hours  insulin lispro (ADMELOG) corrective regimen sliding scale   SubCutaneous every 8 hours  insulin lispro Injectable (ADMELOG) 4 Unit(s) SubCutaneous every 8 hours  labetalol 200 milliGRAM(s) Oral every 8 hours  lacosamide Solution 100 milliGRAM(s) Oral every 12 hours  levETIRAcetam   Injectable 750 milliGRAM(s) IV Push every 12 hours  psyllium Powder 1 Packet(s) Oral every 8 hours  saccharomyces boulardii 250 milliGRAM(s) Oral two times a day  zinc sulfate 220 milliGRAM(s) Oral daily    LABS:                         7.2    8.50  )-----------( 369      ( 13 Nov 2024 04:41 )             22.6     142  |  111[H]  |  33[H]  ----------------------------<  91          (11-13-24 @ 04:41)  5.2[H]   |  23  |  1.1    Ca    8.4          (11-13-24 @ 04:41)  Phos  5.4         (11-13-24 @ 04:41)  Mg     2.0         (11-13-24 @ 04:41)    TPro  4.5[L]  /  Alb  3.0[L]  /  TBili  0.5  /  DBili  x   /  AST  20  /  ALT  14  /  AlkPhos  80       11-13-24 @ 04:41    Triglycerides, Serum: 86 mg/dL (11-04 @ 05:16)  Folate: 5.1 ng/mL (10-24 @ 11:14)  Vitamin B12, Serum: 1311 pg/mL (11-08 @ 04:47)    Blood Glucose (Past 24 hours):  125 mg/dL (11-13 @ 16:10)  174 mg/dL (11-13 @ 13:42)  106 mg/dL (11-13 @ 06:27)  111 mg/dL (11-12 @ 20:52)    DIET:   Diet, NPO with Tube Feed:   Tube Feeding Modality: Gastro-Jejunostomy---------but pt does not have a G-J  Glucerna 1.2 [facility]  Bolus  Total Volume of Bolus (mL):  120  Total # of Feeds: 4  Tube Feed Frequency: Every 6 hours   Tube Feed Start Time: 10:00  Bolus Feed Rate (mL per Hour): 30   Bolus Feed Duration (in Hours): 1  Bolus   Total Volume per Flush (mL): 75   Frequency: Every 6 Hours  Free Water Flush Instructions:  75ml pre and post feeds  Banatrol TF     Qty per Day:  3 (11-13-24 @ 08:02) [Active]

## 2024-11-13 NOTE — PROGRESS NOTE ADULT - SUBJECTIVE AND OBJECTIVE BOX
Gastroenterology progress note:     Patient is a 79y old  Female who presents with a chief complaint of SDH, Seizures (13 Nov 2024 08:04)       Admitted on: 10-31-24    We are following the patient for: dysphagia        Interval History:    No acute events overnight.   s/p PEG placement       PAST MEDICAL & SURGICAL HISTORY:  DM (diabetes mellitus)      MDS (myelodysplastic syndrome)      H/O colonoscopy          MEDICATIONS  (STANDING):  amantadine Syrup 50 milliGRAM(s) Oral <User Schedule>  amLODIPine   Tablet 10 milliGRAM(s) Oral daily  ascorbic acid 500 milliGRAM(s) Oral daily  bacitracin   Ointment 1 Application(s) Topical two times a day  bacitracin   Ointment 1 Application(s) Topical two times a day  chlorhexidine 2% Cloths 1 Application(s) Topical <User Schedule>  dextrose 5%. 1000 milliLiter(s) (100 mL/Hr) IV Continuous <Continuous>  dextrose 5%. 1000 milliLiter(s) (50 mL/Hr) IV Continuous <Continuous>  dextrose 50% Injectable 25 Gram(s) IV Push once  dextrose 50% Injectable 25 Gram(s) IV Push once  dextrose 50% Injectable 12.5 Gram(s) IV Push once  glucagon  Injectable 1 milliGRAM(s) IntraMuscular once  heparin   Injectable 5000 Unit(s) SubCutaneous every 12 hours  hydrALAZINE 25 milliGRAM(s) Oral every 8 hours  insulin lispro (ADMELOG) corrective regimen sliding scale   SubCutaneous every 8 hours  insulin lispro Injectable (ADMELOG) 4 Unit(s) SubCutaneous every 8 hours  labetalol 200 milliGRAM(s) Oral every 8 hours  lacosamide Solution 100 milliGRAM(s) Oral every 12 hours  levETIRAcetam   Injectable 750 milliGRAM(s) IV Push every 12 hours  psyllium Powder 1 Packet(s) Oral every 8 hours  saccharomyces boulardii 250 milliGRAM(s) Oral two times a day  zinc sulfate 220 milliGRAM(s) Oral daily    MEDICATIONS  (PRN):  acetaminophen     Tablet .. 650 milliGRAM(s) Oral every 6 hours PRN Temp greater or equal to 38C (100.4F)  dextrose Oral Gel 15 Gram(s) Oral once PRN Blood Glucose LESS THAN 70 milliGRAM(s)/deciliter  hydrALAZINE Injectable 5 milliGRAM(s) IV Push every 2 hours PRN SBP >150  labetalol Injectable 10 milliGRAM(s) IV Push every 2 hours PRN SBP > 150      Allergies  No Known Allergies      Review of Systems:  limited     Physical Examination:  T(C): 36.4 (11-13-24 @ 11:14), Max: 37.9 (11-12-24 @ 20:15)  HR: 83 (11-13-24 @ 11:14) (71 - 95)  BP: 123/57 (11-13-24 @ 11:14) (114/72 - 166/70)  RR: 18 (11-13-24 @ 11:14) (13 - 21)  SpO2: 99% (11-13-24 @ 11:14) (87% - 100%)      11-12-24 @ 07:01  -  11-13-24 @ 07:00  --------------------------------------------------------  IN: 0 mL / OUT: 575 mL / NET: -575 mL    11-13-24 @ 07:01  -  11-13-24 @ 11:24  --------------------------------------------------------  IN: 0 mL / OUT: 50 mL / NET: -50 mL        GENERAL: non verbal   HEAD:  Atraumatic, Normocephalic  EYES: conjunctiva and sclera clear  NECK: Supple, no JVD or thyromegaly  CHEST/LUNG: Clear to auscultation bilaterally; No wheeze, rhonchi, or rales  HEART: Regular rate and rhythm; normal S1, S2, No murmurs.  ABDOMEN: Soft, nontender, nondistended; PEG in tube   NEUROLOGY: No asterixis or tremor.   SKIN: Intact, no jaundice     Data:                        7.2    8.50  )-----------( 369      ( 13 Nov 2024 04:41 )             22.6     Hgb trend:  7.2  11-13-24 @ 04:41  7.4  11-11-24 @ 04:30  7.5  11-10-24 @ 16:42        11-13    142  |  111[H]  |  33[H]  ----------------------------<  91  5.2[H]   |  23  |  1.1    Ca    8.4      13 Nov 2024 04:41  Phos  5.4     11-13  Mg     2.0     11-13    TPro  4.5[L]  /  Alb  3.0[L]  /  TBili  0.5  /  DBili  x   /  AST  20  /  ALT  14  /  AlkPhos  80  11-13    Liver panel trend:  TBili 0.5   /   AST 20   /   ALT 14   /   AlkP 80   /   Tptn 4.5   /   Alb 3.0    /   DBili --      11-13  TBili 0.5   /   AST 18   /   ALT 17   /   AlkP 85   /   Tptn 4.8   /   Alb 3.3    /   DBili --      11-12  TBili 0.4   /   AST 15   /   ALT 16   /   AlkP 87   /   Tptn 4.6   /   Alb 3.1    /   DBili --      11-11  TBili 0.4   /   AST 21   /   ALT 14   /   AlkP 79   /   Tptn 4.4   /   Alb 2.8    /   DBili --      11-10  TBili 0.4   /   AST 13   /   ALT 14   /   AlkP 78   /   Tptn 4.5   /   Alb 3.0    /   DBili --      11-09  TBili 0.5   /   AST 11   /   ALT 15   /   AlkP 75   /   Tptn 4.2   /   Alb 2.8    /   DBili --      11-07  TBili 0.4   /   AST 12   /   ALT 21   /   AlkP 93   /   Tptn 4.6   /   Alb 3.1    /   DBili --      11-06  TBili 0.5   /   AST 19   /   ALT 17   /   AlkP 72   /   Tptn 3.7   /   Alb 2.6    /   DBili --      11-05      PT/INR - ( 12 Nov 2024 04:32 )   PT: 11.90 sec;   INR: 1.01 ratio         PTT - ( 12 Nov 2024 04:32 )  PTT:34.7 sec

## 2024-11-13 NOTE — PROGRESS NOTE ADULT - SUBJECTIVE AND OBJECTIVE BOX
SIMONA KUHN  79y Female    CHIEF COMPLAINT:    Patient is a 79y old  Female who presents with a chief complaint of SDH, Seizures (2024 11:24)    INTERVAL HPI/OVERNIGHT EVENTS:    Patient seen and examined. No acute events overnight. Overall unchanged     ROS: All other systems are negative.    Vital Signs:    T(F): 97.6 (24 @ 11:14), Max: 100.3 (24 @ 00:14)  HR: 83 (24 @ 11:14) (71 - 90)  BP: 123/57 (24 @ 11:14) (114/72 - 156/65)  RR: 18 (24 @ 11:14) (14 - 18)  SpO2: 99% (24 @ 11:14) (95% - 100%)    2024 07:01  -  2024 07:00  --------------------------------------------------------  IN: 0 mL / OUT: 575 mL / NET: -575 mL    2024 07:01  -  2024 12:40  --------------------------------------------------------  IN: 0 mL / OUT: 50 mL / NET: -50 mL    Daily Weight in k.9 (2024 04:00)    POCT Blood Glucose.: 106 mg/dL (2024 06:27)  POCT Blood Glucose.: 111 mg/dL (2024 20:52)  POCT Blood Glucose.: 131 mg/dL (2024 15:44)    PHYSICAL EXAM:    GENERAL:  NAD ill appearing   SKIN: No rashes or lesions  HEENT: Atraumatic. Normocephalic.   NECK: Supple, No JVD.   PULMONARY: CTA B/L. No wheezing. No rales  CVS: Normal S1, S2. Rate and Rhythm are regular   ABDOMEN/GI: Soft, Nontender, Nondistended   MSK:  No clubbing or cyanosis   NEUROLOGIC: does not follow commands   PSYCH: not awake or alert     Consultant(s) Notes Reviewed:  [x ] YES  [ ] NO  Care Discussed with Consultants/Other Providers [ x] YES  [ ] NO    LABS:                        7.2    8.50  )-----------( 369      ( 2024 04:41 )             22.6     142  |  111[H]  |  33[H]  ----------------------------<  91  5.2[H]   |  23  |  1.1    Ca    8.4      2024 04:41  Phos  5.4     11-13  Mg     2.0     11-13    TPro  4.5[L]  /  Alb  3.0[L]  /  TBili  0.5  /  DBili  x   /  AST  20  /  ALT  14  /  AlkPhos  80  11-13    PT/INR - ( 2024 04:32 )   PT: 11.90 sec;   INR: 1.01 ratio       PTT - ( 2024 04:32 )  PTT:34.7 sec    RADIOLOGY & ADDITIONAL TESTS:  Imaging or report Personally Reviewed:  [x] YES  [ ] NO  EKG reviewed: [x] YES  [ ] NO    Medications:  Standing  amantadine Syrup 50 milliGRAM(s) Oral <User Schedule>  amLODIPine   Tablet 10 milliGRAM(s) Oral daily  ascorbic acid 500 milliGRAM(s) Oral daily  bacitracin   Ointment 1 Application(s) Topical two times a day  bacitracin   Ointment 1 Application(s) Topical two times a day  chlorhexidine 2% Cloths 1 Application(s) Topical <User Schedule>  dextrose 5%. 1000 milliLiter(s) IV Continuous <Continuous>  dextrose 5%. 1000 milliLiter(s) IV Continuous <Continuous>  dextrose 50% Injectable 25 Gram(s) IV Push once  dextrose 50% Injectable 12.5 Gram(s) IV Push once  dextrose 50% Injectable 25 Gram(s) IV Push once  glucagon  Injectable 1 milliGRAM(s) IntraMuscular once  heparin   Injectable 5000 Unit(s) SubCutaneous every 12 hours  hydrALAZINE 25 milliGRAM(s) Oral every 8 hours  insulin lispro (ADMELOG) corrective regimen sliding scale   SubCutaneous every 8 hours  insulin lispro Injectable (ADMELOG) 4 Unit(s) SubCutaneous every 8 hours  labetalol 200 milliGRAM(s) Oral every 8 hours  lacosamide Solution 100 milliGRAM(s) Oral every 12 hours  levETIRAcetam   Injectable 750 milliGRAM(s) IV Push every 12 hours  psyllium Powder 1 Packet(s) Oral every 8 hours  saccharomyces boulardii 250 milliGRAM(s) Oral two times a day  zinc sulfate 220 milliGRAM(s) Oral daily    PRN Meds  acetaminophen     Tablet .. 650 milliGRAM(s) Oral every 6 hours PRN  dextrose Oral Gel 15 Gram(s) Oral once PRN  hydrALAZINE Injectable 5 milliGRAM(s) IV Push every 2 hours PRN  labetalol Injectable 10 milliGRAM(s) IV Push every 2 hours PRN

## 2024-11-13 NOTE — CONSULT NOTE ADULT - ATTENDING COMMENTS
Patient seen and examined and agree with above except as noted.  Patients history, notes, labs, imaging, vitals and meds reviewed personally.    Plan as above
Will reassess for EGD/PEG pending further neuro workup, when clinically optimized and pending SLP evaluation.

## 2024-11-13 NOTE — CONSULT NOTE ADULT - SUBJECTIVE AND OBJECTIVE BOX
NEUROLOGY CONSULT    HPI:  79F w/ PMHx of Myelodysplastic syndrome, DM, HTN, CKD, R hemispheric SDH 2.2cm w/ mass effect and 1.1cm MLS , went  to OR for evacuation and admitted to NSICU for further management. While in NSICU, patient underwent MMA embolization and post operatively was stable, however, on 10/16 she was noted to have leaking from her crani site. STAT CTH showed increase in SDH and extracalvarial collection, NSG removed 25cc of blood and was re-sutured. Patient then developed worsening mental status and repeat CTH showing stable SDH, CTA negative for acute stroke. Patient was found to be in status epilepticus and admitted to NSICU. Patient is controlled on keppra 750 mg q12 and vimpat 100 mg q12.      MEDICATIONS  Home Medications:  amLODIPine 10 mg oral tablet: 1 tab(s) orally once a day (31 Oct 2024 16:24)  glimepiride 2 mg oral tablet: 1 tab(s) orally 2 times a day (31 Oct 2024 16:24)  Jardiance 10 mg oral tablet: 1 tab(s) orally once a day (31 Oct 2024 16:24)  labetalol 200 mg oral tablet: 1 tab(s) orally every 8 hours (31 Oct 2024 16:24)  levETIRAcetam 500 mg oral tablet: 1 tab(s) orally 2 times a day (31 Oct 2024 16:24)  lisinopril 10 mg oral tablet: 1 tab(s) orally once a day (31 Oct 2024 16:24)  pantoprazole 40 mg oral delayed release tablet: 1 tab(s) orally once a day (before a meal) (31 Oct 2024 16:24)  pioglitazone 30 mg oral tablet: 1 tab(s) orally once a day (31 Oct 2024 16:24)  polyethylene glycol 3350 oral powder for reconstitution: 17 gram(s) orally once a day (31 Oct 2024 16:24)  pyridoxine 100 mg oral tablet: 1 tab(s) orally once a day (31 Oct 2024 16:24)  senna leaf extract oral tablet: 2 tab(s) orally once a day (at bedtime) (31 Oct 2024 16:24)  SPS 15 GM/60 ML SUSPENSION: TAKE 15ML TWICE WEEKLY (31 Oct 2024 16:24)  Tradjenta 5 mg oral tablet: 1 tab(s) orally once a day (31 Oct 2024 16:24)    MEDICATIONS  (STANDING):  amantadine Syrup 50 milliGRAM(s) Oral <User Schedule>  amLODIPine   Tablet 10 milliGRAM(s) Oral daily  ascorbic acid 500 milliGRAM(s) Oral daily  bacitracin   Ointment 1 Application(s) Topical two times a day  bacitracin   Ointment 1 Application(s) Topical two times a day  chlorhexidine 2% Cloths 1 Application(s) Topical <User Schedule>  dextrose 5%. 1000 milliLiter(s) (50 mL/Hr) IV Continuous <Continuous>  dextrose 5%. 1000 milliLiter(s) (100 mL/Hr) IV Continuous <Continuous>  dextrose 50% Injectable 12.5 Gram(s) IV Push once  dextrose 50% Injectable 25 Gram(s) IV Push once  dextrose 50% Injectable 25 Gram(s) IV Push once  glucagon  Injectable 1 milliGRAM(s) IntraMuscular once  heparin   Injectable 5000 Unit(s) SubCutaneous every 12 hours  hydrALAZINE 25 milliGRAM(s) Oral every 8 hours  insulin lispro (ADMELOG) corrective regimen sliding scale   SubCutaneous every 8 hours  insulin lispro Injectable (ADMELOG) 4 Unit(s) SubCutaneous every 8 hours  labetalol 200 milliGRAM(s) Oral every 8 hours  lacosamide Solution 100 milliGRAM(s) Oral every 12 hours  levETIRAcetam   Injectable 750 milliGRAM(s) IV Push every 12 hours  psyllium Powder 1 Packet(s) Oral every 8 hours  saccharomyces boulardii 250 milliGRAM(s) Oral two times a day  zinc sulfate 220 milliGRAM(s) Oral daily    MEDICATIONS  (PRN):  acetaminophen     Tablet .. 650 milliGRAM(s) Oral every 6 hours PRN Temp greater or equal to 38C (100.4F)  dextrose Oral Gel 15 Gram(s) Oral once PRN Blood Glucose LESS THAN 70 milliGRAM(s)/deciliter  hydrALAZINE Injectable 5 milliGRAM(s) IV Push every 2 hours PRN SBP >150  labetalol Injectable 10 milliGRAM(s) IV Push every 2 hours PRN SBP > 150      FAMILY HISTORY:    SOCIAL HISTORY: negative for tobacco, alcohol, or ilicit drug use.    Allergies    No Known Allergies    Intolerances        Neurological Examination:  General:  Appearance is consistent with chronologic age.   Cognitive/Language:  Awake, alert, and oriented to person, place, time and date.  Recent and remote memory intact.  Fund of knowledge is appropriate.  Naming, repetition and comprehension intact. Nondysarthric.    Cranial Nerves  - Eyes: Visual fields full.  EOMI w/o nystagmus, skew or reported double vision.  PERRL.  No ptosis/weakness of eyelid closure.    - Face:  Facial sensation normal V1 - 3, no facial asymmetry.    - Ears/Nose/Throat:  Hearing grossly intact b/l to finger rub.  Palate elevates midline.  Tongue and uvula midline.   Motor exam: Normal tone and bulk. No tenderness, twitching, tremors or involuntary movements.            Upper extremity                  Bicep     Tricep     HG                                                 R      5/5 5/5 5/5                                                    L       5/5 5/5 5/5              Lower extremity                   HF        KE        DF         PF                                                  R     5/5 5/5 5/5 5/5                                               L      5/5 5/5 5/5 5/5    Sensory examination:  Intact to light touch and pinprick, pain, temperature and proprioception and vibration in all extremities.  Reflexes: 2+ b/l biceps, triceps, patella and achilles.  Plantar response downgoing b/l.  Darline, clonus absent.  Cerebellum: FTN/HKS intact.  No dysmetria.    Gait narrow based and normal.    LABS:                        7.2    8.50  )-----------( 369      ( 13 Nov 2024 04:41 )             22.6     11-13    142  |  111[H]  |  33[H]  ----------------------------<  91  5.2[H]   |  23  |  1.1    Ca    8.4      13 Nov 2024 04:41  Phos  5.4     11-13  Mg     2.0     11-13    TPro  4.5[L]  /  Alb  3.0[L]  /  TBili  0.5  /  DBili  x   /  AST  20  /  ALT  14  /  AlkPhos  80  11-13    Hemoglobin A1C:   Vitamin B12   PT/INR - ( 12 Nov 2024 04:32 )   PT: 11.90 sec;   INR: 1.01 ratio         PTT - ( 12 Nov 2024 04:32 )  PTT:34.7 sec  CAPILLARY BLOOD GLUCOSE      POCT Blood Glucose.: 106 mg/dL (13 Nov 2024 06:27)      Urinalysis Basic - ( 13 Nov 2024 04:41 )    Color: x / Appearance: x / SG: x / pH: x  Gluc: 91 mg/dL / Ketone: x  / Bili: x / Urobili: x   Blood: x / Protein: x / Nitrite: x   Leuk Esterase: x / RBC: x / WBC x   Sq Epi: x / Non Sq Epi: x / Bacteria: x            Microbiology:      RADIOLOGY, EKG AND ADDITIONAL TESTS: Reviewed.           NEUROLOGY CONSULT    HPI:  79F w/ PMHx of Myelodysplastic syndrome, DM, HTN, CKD, R hemispheric SDH 2.2cm w/ mass effect and 1.1cm MLS , went  to OR for evacuation and admitted to NSICU for further management. While in NSICU, patient underwent MMA embolization and post operatively was stable, however, on 10/16 she was noted to have leaking from her crani site. STAT CTH showed increase in SDH and extracalvarial collection, NSG removed 25cc of blood and was re-sutured. Patient then developed worsening mental status and repeat CTH showing stable SDH, CTA negative for acute stroke. Patient was found to be in status epilepticus and admitted to NSICU. Patient is controlled on keppra 750 mg q12 and vimpat 100 mg q12.      MEDICATIONS  Home Medications:  amLODIPine 10 mg oral tablet: 1 tab(s) orally once a day (31 Oct 2024 16:24)  glimepiride 2 mg oral tablet: 1 tab(s) orally 2 times a day (31 Oct 2024 16:24)  Jardiance 10 mg oral tablet: 1 tab(s) orally once a day (31 Oct 2024 16:24)  labetalol 200 mg oral tablet: 1 tab(s) orally every 8 hours (31 Oct 2024 16:24)  levETIRAcetam 500 mg oral tablet: 1 tab(s) orally 2 times a day (31 Oct 2024 16:24)  lisinopril 10 mg oral tablet: 1 tab(s) orally once a day (31 Oct 2024 16:24)  pantoprazole 40 mg oral delayed release tablet: 1 tab(s) orally once a day (before a meal) (31 Oct 2024 16:24)  pioglitazone 30 mg oral tablet: 1 tab(s) orally once a day (31 Oct 2024 16:24)  polyethylene glycol 3350 oral powder for reconstitution: 17 gram(s) orally once a day (31 Oct 2024 16:24)  pyridoxine 100 mg oral tablet: 1 tab(s) orally once a day (31 Oct 2024 16:24)  senna leaf extract oral tablet: 2 tab(s) orally once a day (at bedtime) (31 Oct 2024 16:24)  SPS 15 GM/60 ML SUSPENSION: TAKE 15ML TWICE WEEKLY (31 Oct 2024 16:24)  Tradjenta 5 mg oral tablet: 1 tab(s) orally once a day (31 Oct 2024 16:24)    MEDICATIONS  (STANDING):  amantadine Syrup 50 milliGRAM(s) Oral <User Schedule>  amLODIPine   Tablet 10 milliGRAM(s) Oral daily  ascorbic acid 500 milliGRAM(s) Oral daily  bacitracin   Ointment 1 Application(s) Topical two times a day  bacitracin   Ointment 1 Application(s) Topical two times a day  chlorhexidine 2% Cloths 1 Application(s) Topical <User Schedule>  dextrose 5%. 1000 milliLiter(s) (50 mL/Hr) IV Continuous <Continuous>  dextrose 5%. 1000 milliLiter(s) (100 mL/Hr) IV Continuous <Continuous>  dextrose 50% Injectable 12.5 Gram(s) IV Push once  dextrose 50% Injectable 25 Gram(s) IV Push once  dextrose 50% Injectable 25 Gram(s) IV Push once  glucagon  Injectable 1 milliGRAM(s) IntraMuscular once  heparin   Injectable 5000 Unit(s) SubCutaneous every 12 hours  hydrALAZINE 25 milliGRAM(s) Oral every 8 hours  insulin lispro (ADMELOG) corrective regimen sliding scale   SubCutaneous every 8 hours  insulin lispro Injectable (ADMELOG) 4 Unit(s) SubCutaneous every 8 hours  labetalol 200 milliGRAM(s) Oral every 8 hours  lacosamide Solution 100 milliGRAM(s) Oral every 12 hours  levETIRAcetam   Injectable 750 milliGRAM(s) IV Push every 12 hours  psyllium Powder 1 Packet(s) Oral every 8 hours  saccharomyces boulardii 250 milliGRAM(s) Oral two times a day  zinc sulfate 220 milliGRAM(s) Oral daily    MEDICATIONS  (PRN):  acetaminophen     Tablet .. 650 milliGRAM(s) Oral every 6 hours PRN Temp greater or equal to 38C (100.4F)  dextrose Oral Gel 15 Gram(s) Oral once PRN Blood Glucose LESS THAN 70 milliGRAM(s)/deciliter  hydrALAZINE Injectable 5 milliGRAM(s) IV Push every 2 hours PRN SBP >150  labetalol Injectable 10 milliGRAM(s) IV Push every 2 hours PRN SBP > 150      FAMILY HISTORY:    SOCIAL HISTORY: negative for tobacco, alcohol, or ilicit drug use.    Allergies    No Known Allergies    Intolerances      Neurologic exam)  -Mental status: moans to noxious stimuli but does not open eyes  -Cranial nerves:  II: Blink-to-threat: not present  II, III: Pupillary responses: equal and reactive to light  III, IV, VI: primary gaze midline, +VOR  VII: Face is symmetric  V,VII: positive Corneal reflex, no facial asymmetry, + grimace response  IX,X Gag reflex: present  Sensory: Withdrawal from a painful stimulus not present, patient will moan to noxious stimuli in all extremities but does not withdraw any extremity to pain  Motor: no Spontaneous movement, no myoclonus  Reflexes: 2/4 DTR, mute Plantar response, no posturing reflexes, no clonus      LABS:                        7.2    8.50  )-----------( 369      ( 13 Nov 2024 04:41 )             22.6     11-13    142  |  111[H]  |  33[H]  ----------------------------<  91  5.2[H]   |  23  |  1.1    Ca    8.4      13 Nov 2024 04:41  Phos  5.4     11-13  Mg     2.0     11-13    TPro  4.5[L]  /  Alb  3.0[L]  /  TBili  0.5  /  DBili  x   /  AST  20  /  ALT  14  /  AlkPhos  80  11-13    Hemoglobin A1C:   Vitamin B12   PT/INR - ( 12 Nov 2024 04:32 )   PT: 11.90 sec;   INR: 1.01 ratio         PTT - ( 12 Nov 2024 04:32 )  PTT:34.7 sec  CAPILLARY BLOOD GLUCOSE      POCT Blood Glucose.: 106 mg/dL (13 Nov 2024 06:27)      Urinalysis Basic - ( 13 Nov 2024 04:41 )    Color: x / Appearance: x / SG: x / pH: x  Gluc: 91 mg/dL / Ketone: x  / Bili: x / Urobili: x   Blood: x / Protein: x / Nitrite: x   Leuk Esterase: x / RBC: x / WBC x   Sq Epi: x / Non Sq Epi: x / Bacteria: x            Microbiology:      RADIOLOGY, EKG AND ADDITIONAL TESTS: Reviewed.

## 2024-11-13 NOTE — PROGRESS NOTE ADULT - ASSESSMENT
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 While in NSICU, patient underwent MMA embolization and post operatively was stable, however, on 10/16 she was noted to have leaking from her crani site. STAT CTH showed increase in SDH and extracalvarial collection, NSG removed 25cc of blood and was re-sutured. Patient was then downgraded to the floor, was started on zosyn for UTI, and was discharged to short term rehab on 10/27. Patient returns to [facility] due to worsening lethargy, admitted to NCCU due to concern for seizures, now downgraded to SDU    Recently diagnosed SDH  Concern for Status epilepticus  Dysphagia s/p PEG on 11/12  - s/p ketamine gtt in NCCU  - MR Brain 11/5 - without acuity; stable SDH  - serial CTH stable   - SBP goal: 110 -  <150  - s/p EEG, currently on Keppra 750 Q12H and vimpat 100 mg q12 on 11/8, neurology following   - c/w amantidine 50mg q 12 daily and monitor for clinical improvement   - s/p PEG placement 11/12, can initiate feeds today     UTI with low grade temps   - UCx + enterococcus. s/p ampicillin  - 11/12 overnight temp of 100.3-> repeat blood cx pending   - monitor diarrhea     HTN   - SBP goal: 110 -  <150  - - Echo  10/9/24: Normal global left ventricular systolic function. Left ventricular ejection fraction, by visual estimation, is 65 to 70%  - c/w lisinopril 20mg , labetalol 200mg q 8; c/w norvasc 10mg. DC IV antihyperntesives   - Echo  TTE Echo Complete w/o Contrast w/ Doppler (10.09.24 @ 12:41) >Normal global left ventricular systolic function. Left ventricular ejection fraction, by visual estimation, is 65 to 70%    MARCO on CKD improving  - monitor BMP, c/w bladder scans q8H    MDS with Anemia, receives PRN PRBC  - s/p 1u PRBC transfusion 11/7   - monitor CBC< transfuse if Hb <4  - OP fu with Dr Wade     Overall prognosis is guarded  Pending: neurochecks per neurology, monitor fever curve/off abx, fu blood culture, labs, resume tube feeds, monitor diarrhea    Patient seen at bedside, total time spent to evaluate and treat the patient's acute illness and chronic medical conditions as well as time spent reviewing prior records, labs, radiology, documenting in electronic medical records,  discussing medical plan with   medical team was more than 40 minutes with >50% of time spent face to face with patient, discussing with patient/family as well as coordination of care

## 2024-11-13 NOTE — PROGRESS NOTE ADULT - ASSESSMENT
79 y.o female with h/o MDS, HTN, DM, recent R hemispheric SDH 10/8/2024 s/p crani, evac, and MMA embolization  pt readmitted 10/31 for lethargy, r/o status epilepticus.  + dysphagia - nasogastric tube placed for enteral feeding - now s/p GT placement  + diarrhea   + stage 2 gluteal/ buttock    plan:  - REE calculated by IJE (1431 k/d), and ASPEN recs (1451-9721 k/d)  - continue Glucena 1.2 -        240 ml over 30-40 min with pt seated upright during and 1/2 hour after feed -- after 2 such feeds tolerated increase to       360 ml over 45 min at 7a, 1 pm, 7 pm   - check poc glucose and give any indicated insulin before each feeding (not q6h or q8h)  - once stable, increase mid day feed to 480 ml over an hour --> 1200 ml, 1425 kcal, 71 gm protein/d  - Banatrol TF up to 3 times a day prn watery stool - ordered but unclear if ever given  - considering Abx history, FloraStor added  250 mg twice a day x 10 days

## 2024-11-13 NOTE — PROGRESS NOTE ADULT - ASSESSMENT
79F w/ PMHx of MDS, DM, HTN, CKD, recent admission for SDH s/p crani, evac and MMA embolization, presents with decreased MS, found to have stable SDH, admitted to NSICU for r/o status epilepticus GI was consulted for PEG placement.     # Dysphagia    # SDH with possible Status epilepticus  # Evaluation for PEG placement   - s/p peg placement 11/12  - today site examined, bumper is loosened       recommendations:  can use peg for meds and feeds  flush before and after each use  keep abdominal binder on at all times   Routine care of PEG

## 2024-11-13 NOTE — CHART NOTE - NSCHARTNOTEFT_GEN_A_CORE
Transfer Note    Transfer from: CEU    Transfer to: (  X) Medicine    (  ) Telemetry     (  ) RCU       (  ) CEU    (  ) VENT                        (  ) Palliative    (  ) Stroke Unit    (  ) MICU    (  ) CCU    Signout given to:     ----------------------------------------------------------------------------------------------------------  HPI / ICU COURSE:    HPI:  79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 While in NSICU, patient underwent MMA embolization and post operatively was stable, however, on 10/16 she was noted to have leaking from her crani site. STAT CTH showed increase in SDH and extracalvarial collection, NSG removed 25cc of blood and was re-sutured. Patient was then downgraded to the floor, was started on zosyn for UTI, and was discharged to short term rehab on 10/27. Today, patient was more lethargic and presented to the ED for evaluation. CTH showing stable SDH, CTA negative for acute stroke. NSx and NSICU consulted for management.     NICU course:  CTH EEG showed stable findings. UCx + for enterococcus. s/p ampicillin. s/p PEG on 11/12    CEU course:  started feeds 11/13am. advance as tolerated. refeeding labs tm am. f/u keppra level.       --------------------------------------------------------------------------------------------------------  PMH/PSH:  PAST MEDICAL & SURGICAL HISTORY:  DM (diabetes mellitus)    MDS (myelodysplastic syndrome)    H/O colonoscopy        -------------------------------------------------------------------------------------------------------  PHYSICAL EXAM:  General: NAD  HEENT: Atraumatic  Respiratory: CTAB  Cardiac: RRR  Abdomen: soft, non-tender, non-distended  Extremities: warm and well-perfused  Neuro: A+Ox4. No FND    Vital Signs Last 24 Hrs  T(C): 36.6 (13 Nov 2024 07:37), Max: 37.9 (12 Nov 2024 20:15)  T(F): 97.9 (13 Nov 2024 07:37), Max: 100.3 (13 Nov 2024 00:14)  HR: 71 (13 Nov 2024 07:37) (71 - 95)  BP: 127/59 (13 Nov 2024 07:37) (114/72 - 166/70)  BP(mean): 71 (13 Nov 2024 07:37) (71 - 100)  RR: 18 (13 Nov 2024 07:37) (13 - 21)  SpO2: 99% (13 Nov 2024 07:37) (87% - 100%)    Parameters below as of 13 Nov 2024 07:37  Patient On (Oxygen Delivery Method): room air        I&O's Summary    12 Nov 2024 07:01  -  13 Nov 2024 07:00  --------------------------------------------------------  IN: 0 mL / OUT: 575 mL / NET: -575 mL    13 Nov 2024 07:01  -  13 Nov 2024 10:28  --------------------------------------------------------  IN: 0 mL / OUT: 50 mL / NET: -50 mL        --------------------------------------------------------------------------------------------------------  LABS:                               7.2    8.50  )-----------( 369      ( 13 Nov 2024 04:41 )             22.6       11-13    142  |  111[H]  |  33[H]  ----------------------------<  91  5.2[H]   |  23  |  1.1    Ca    8.4      13 Nov 2024 04:41  Phos  5.4     11-13  Mg     2.0     11-13    TPro  4.5[L]  /  Alb  3.0[L]  /  TBili  0.5  /  DBili  x   /  AST  20  /  ALT  14  /  AlkPhos  80  11-13      PT/INR - ( 12 Nov 2024 04:32 )   PT: 11.90 sec;   INR: 1.01 ratio         PTT - ( 12 Nov 2024 04:32 )  PTT:34.7 sec        CULTURE RESULTS:                11-09-24 @ 17:30  Specimen Source: --  Method Type: --  Gram Stain - RRL: --  Gram Stain - Wound: --  Bacteria: --  Culture Results:   No enteric pathogens isolated.  (Stool culture examined for Salmonella,  Shigella, Campylobacter, Aeromonas, Plesiomonas,  Vibrio, E.coli O157 and Yersinia)  Few Yeast like cells  No enteric gram negative rods isolated      Specimen Source:   Method Type:   Gram Stain:   Culture Results: Culture Results:   No enteric pathogens isolated.  (Stool culture examined for Salmonella,  Shigella, Campylobacter, Aeromonas, Plesiomonas,  Vibrio, E.coli O157 and Yersinia)  Few Yeast like cells  No enteric gram negative rods isolated (11-09-24 @ 17:30)    Bacteria:       -------------------------------------------------------------------------------------------------  RADIOLOGY:      ---------------------------------------------------------------------------------------------------  ASSESSMENT & PLAN:     79F w/ PMHx of MDS, DM, HTN, CKD, recent admission for SDH s/p crani, evac and MMA embolization, presents with decreased MS, found to have stable SDH, admitted to NSICU for r/o status epilepticus         Plan:    #SDH  #R/o Status epilepticus  - s/p ketamine gtt trial to r/o CSD  - Neuro Checks Q4 NC  - CTH stable on 11/1  - MR Brain 11/5 - without acuity; stable SDH  - Keppra 750 gram q 12  - f/u keppra level from 11/8 (with improved CrCl)- so will maintain same dose - 11/4 keppra level was 98 (with Cr 1.5). f/u keppra level tm am  - vimpat level 4.02-- increased vimpat to 100 mg q12 on 11/8  - EEg with triphasic waves, mild improvement of background- unchanged - will D/C   - c/w amantidine 50mg q 12 daily and monitor for clinical improvement   - Analgesia/Sedation: Tylenol PRN  - PT/OT  - mobility as tolerates   - ammonia wnl  - f/u neuro about neuro checks    #UTI  - UCx + enterococcus. s/p ampicillin  - 11/12 overnight temp of 100.3-> BCx was drawn    #HTN   - SBP goal: 110 -  <150  - s/p Lasix 40mg IV X1   - c/w lisinopril 20mg , labetalol 200mg q 8; c/w norvasc 10mg  prn labetalol/hydralazine   - Echo  TTE Echo Complete w/o Contrast w/ Doppler (10.09.24 @ 12:41) >Normal global left ventricular systolic function. Left ventricular ejection fraction, by visual estimation, is 65 to 70%  - EKG- QTc - NL - 452       #diarrhea- - may be abx-associated (abx completing recently) vs feed related-> resolving  #dysphagia s/p PEG on 11/12  - Darren feeding tube for bolus feeds per nutrition- f/u recs  - Banatrol + metamucil  q 8 started q 8 11/11   - rectal tube 11/3- D/C in am   - lactobacillus --> changed to florastor as per nutrition  - f/u stool studies  - started feeds 11/13 am. advance as tolerated. f/u refeeding labs in am    #MARCO on CKD improving  primafit/bladder scan q 6  - Sodium Goal: 135 - 145  - Replete as needed.  - Mg > 2  - trend Cr    #Pre- diabetic   - POCT, ISS,  -180  - A1c: 6  - TSH: 2.63    ID: s/p UTI  - MRSA nasal neg   s/p ampicillin for enterococcus UTI completed  - Normothermia    # MDS  #Anemia  - anemia to 6.5, transfusing 1 unit on 11/7 with improved Hb to 8.8-->  7.8--> 7.1 (likely from MDS)  - h/o Chronic blood transfusions  - SCDs, chemoppx  - LUE venous doppler for swollen/indurated L forearm-  - Superficial thrombophlebitis of the left cephalic vein elevate LUE, topical antiseptic and cold packs  - neurovascular checks of LUE q4  - cpk normal  - folows OP Dr Wade        #MISC  - Activity: IAT  - CODE: FULL  - Dispo: DGTF when neuro checks can be decreased per neuro                    FOR FOLLOW UP:  [ ]   [ ]   [ ]

## 2024-11-13 NOTE — CONSULT NOTE ADULT - ASSESSMENT
79F w/ PMHx of Myelodysplastic syndrome, DM, HTN, CKD, R hemispheric SDH 2.2cm w/ mass effect and 1.1cm MLS , went  to OR for evacuation now s/p MMA embo with worsening extracalvarial bleed and had to go back to the OR. Patient then developed persistent AMS and was found to be in status epilpeticus s/p ketamine drip now weaned off controlled on keppra and vimpat    Recommendations  - reconnect to video EEG as patient still altered  - continue keppra 750 gm q12 and vimpat 100 mg q12   79F w/ PMHx of Myelodysplastic syndrome, DM, HTN, CKD, R hemispheric SDH 2.2cm w/ mass effect and 1.1cm MLS , went  to OR for evacuation now s/p MMA embo with worsening extracalvarial bleed and had to go back to the OR. Patient then developed persistent AMS and was found to be in status epilpeticus s/p ketamine drip now weaned off controlled on keppra and vimpat    Recommendations  - routine EEG   - continue keppra 750 gm q12 and vimpat 100 mg q12    Case discussed with Dr. Armstrong

## 2024-11-14 LAB
ANION GAP SERPL CALC-SCNC: 13 MMOL/L — SIGNIFICANT CHANGE UP (ref 7–14)
BASOPHILS # BLD AUTO: 0.05 K/UL — SIGNIFICANT CHANGE UP (ref 0–0.2)
BASOPHILS # BLD AUTO: 0.06 K/UL — SIGNIFICANT CHANGE UP (ref 0–0.2)
BASOPHILS NFR BLD AUTO: 0.4 % — SIGNIFICANT CHANGE UP (ref 0–1)
BASOPHILS NFR BLD AUTO: 0.5 % — SIGNIFICANT CHANGE UP (ref 0–1)
BUN SERPL-MCNC: 35 MG/DL — HIGH (ref 10–20)
CALCIUM SERPL-MCNC: 8.9 MG/DL — SIGNIFICANT CHANGE UP (ref 8.4–10.5)
CHLORIDE SERPL-SCNC: 107 MMOL/L — SIGNIFICANT CHANGE UP (ref 98–110)
CO2 SERPL-SCNC: 20 MMOL/L — SIGNIFICANT CHANGE UP (ref 17–32)
CREAT SERPL-MCNC: 0.9 MG/DL — SIGNIFICANT CHANGE UP (ref 0.7–1.5)
EGFR: 65 ML/MIN/1.73M2 — SIGNIFICANT CHANGE UP
EOSINOPHIL # BLD AUTO: 0.17 K/UL — SIGNIFICANT CHANGE UP (ref 0–0.7)
EOSINOPHIL # BLD AUTO: 0.21 K/UL — SIGNIFICANT CHANGE UP (ref 0–0.7)
EOSINOPHIL NFR BLD AUTO: 1.5 % — SIGNIFICANT CHANGE UP (ref 0–8)
EOSINOPHIL NFR BLD AUTO: 1.8 % — SIGNIFICANT CHANGE UP (ref 0–8)
GLUCOSE BLDC GLUCOMTR-MCNC: 110 MG/DL — HIGH (ref 70–99)
GLUCOSE BLDC GLUCOMTR-MCNC: 145 MG/DL — HIGH (ref 70–99)
GLUCOSE BLDC GLUCOMTR-MCNC: 156 MG/DL — HIGH (ref 70–99)
GLUCOSE BLDC GLUCOMTR-MCNC: 168 MG/DL — HIGH (ref 70–99)
GLUCOSE BLDC GLUCOMTR-MCNC: 88 MG/DL — SIGNIFICANT CHANGE UP (ref 70–99)
GLUCOSE BLDC GLUCOMTR-MCNC: 99 MG/DL — SIGNIFICANT CHANGE UP (ref 70–99)
GLUCOSE SERPL-MCNC: 110 MG/DL — HIGH (ref 70–99)
HCT VFR BLD CALC: 20.4 % — LOW (ref 37–47)
HCT VFR BLD CALC: 20.6 % — LOW (ref 37–47)
HGB BLD-MCNC: 6.5 G/DL — CRITICAL LOW (ref 12–16)
HGB BLD-MCNC: 6.6 G/DL — CRITICAL LOW (ref 12–16)
IMM GRANULOCYTES NFR BLD AUTO: 0.4 % — HIGH (ref 0.1–0.3)
IMM GRANULOCYTES NFR BLD AUTO: 0.4 % — HIGH (ref 0.1–0.3)
LYMPHOCYTES # BLD AUTO: 0.72 K/UL — LOW (ref 1.2–3.4)
LYMPHOCYTES # BLD AUTO: 0.89 K/UL — LOW (ref 1.2–3.4)
LYMPHOCYTES # BLD AUTO: 6.2 % — LOW (ref 20.5–51.1)
LYMPHOCYTES # BLD AUTO: 7.7 % — LOW (ref 20.5–51.1)
MAGNESIUM SERPL-MCNC: 2 MG/DL — SIGNIFICANT CHANGE UP (ref 1.8–2.4)
MCHC RBC-ENTMCNC: 29.8 PG — SIGNIFICANT CHANGE UP (ref 27–31)
MCHC RBC-ENTMCNC: 30.4 PG — SIGNIFICANT CHANGE UP (ref 27–31)
MCHC RBC-ENTMCNC: 31.6 G/DL — LOW (ref 32–37)
MCHC RBC-ENTMCNC: 32.4 G/DL — SIGNIFICANT CHANGE UP (ref 32–37)
MCV RBC AUTO: 94 FL — SIGNIFICANT CHANGE UP (ref 81–99)
MCV RBC AUTO: 94.5 FL — SIGNIFICANT CHANGE UP (ref 81–99)
MONOCYTES # BLD AUTO: 0.92 K/UL — HIGH (ref 0.1–0.6)
MONOCYTES # BLD AUTO: 0.99 K/UL — HIGH (ref 0.1–0.6)
MONOCYTES NFR BLD AUTO: 7.9 % — SIGNIFICANT CHANGE UP (ref 1.7–9.3)
MONOCYTES NFR BLD AUTO: 8.5 % — SIGNIFICANT CHANGE UP (ref 1.7–9.3)
NEUTROPHILS # BLD AUTO: 9.42 K/UL — HIGH (ref 1.4–6.5)
NEUTROPHILS # BLD AUTO: 9.7 K/UL — HIGH (ref 1.4–6.5)
NEUTROPHILS NFR BLD AUTO: 81.4 % — HIGH (ref 42.2–75.2)
NEUTROPHILS NFR BLD AUTO: 83.3 % — HIGH (ref 42.2–75.2)
NRBC # BLD: 0 /100 WBCS — SIGNIFICANT CHANGE UP (ref 0–0)
NRBC # BLD: 0 /100 WBCS — SIGNIFICANT CHANGE UP (ref 0–0)
PHOSPHATE SERPL-MCNC: 4.7 MG/DL — SIGNIFICANT CHANGE UP (ref 2.1–4.9)
PLATELET # BLD AUTO: 400 K/UL — SIGNIFICANT CHANGE UP (ref 130–400)
PLATELET # BLD AUTO: 422 K/UL — HIGH (ref 130–400)
PMV BLD: 10 FL — SIGNIFICANT CHANGE UP (ref 7.4–10.4)
PMV BLD: 10.9 FL — HIGH (ref 7.4–10.4)
POTASSIUM SERPL-MCNC: 4.6 MMOL/L — SIGNIFICANT CHANGE UP (ref 3.5–5)
POTASSIUM SERPL-SCNC: 4.6 MMOL/L — SIGNIFICANT CHANGE UP (ref 3.5–5)
RBC # BLD: 2.17 M/UL — LOW (ref 4.2–5.4)
RBC # BLD: 2.18 M/UL — LOW (ref 4.2–5.4)
RBC # FLD: 15 % — HIGH (ref 11.5–14.5)
RBC # FLD: 15 % — HIGH (ref 11.5–14.5)
SODIUM SERPL-SCNC: 140 MMOL/L — SIGNIFICANT CHANGE UP (ref 135–146)
WBC # BLD: 11.58 K/UL — HIGH (ref 4.8–10.8)
WBC # BLD: 11.65 K/UL — HIGH (ref 4.8–10.8)
WBC # FLD AUTO: 11.58 K/UL — HIGH (ref 4.8–10.8)
WBC # FLD AUTO: 11.65 K/UL — HIGH (ref 4.8–10.8)

## 2024-11-14 PROCEDURE — 99232 SBSQ HOSP IP/OBS MODERATE 35: CPT

## 2024-11-14 RX ORDER — LEVETIRACETAM 1000 MG/1
1000 TABLET ORAL EVERY 12 HOURS
Refills: 0 | Status: DISCONTINUED | OUTPATIENT
Start: 2024-11-14 | End: 2024-11-16

## 2024-11-14 RX ADMIN — Medication 1 PACKET(S): at 05:02

## 2024-11-14 RX ADMIN — LABETALOL 200 MILLIGRAM(S): 100 TABLET, FILM COATED ORAL at 14:22

## 2024-11-14 RX ADMIN — HYDRALAZINE HYDROCHLORIDE 25 MILLIGRAM(S): 10 TABLET ORAL at 05:02

## 2024-11-14 RX ADMIN — Medication 220 MILLIGRAM(S): at 11:39

## 2024-11-14 RX ADMIN — Medication 250 MILLIGRAM(S): at 17:50

## 2024-11-14 RX ADMIN — AMLODIPINE BESYLATE 10 MILLIGRAM(S): 10 TABLET ORAL at 05:03

## 2024-11-14 RX ADMIN — Medication 2: at 22:49

## 2024-11-14 RX ADMIN — Medication 50 MILLIGRAM(S): at 14:18

## 2024-11-14 RX ADMIN — LACOSAMIDE 100 MILLIGRAM(S): 10 INJECTION INTRAVENOUS at 17:49

## 2024-11-14 RX ADMIN — Medication 1 PACKET(S): at 14:22

## 2024-11-14 RX ADMIN — Medication 1 APPLICATION(S): at 06:47

## 2024-11-14 RX ADMIN — Medication 500 MILLIGRAM(S): at 11:39

## 2024-11-14 RX ADMIN — Medication 5000 UNIT(S): at 05:04

## 2024-11-14 RX ADMIN — LEVETIRACETAM 750 MILLIGRAM(S): 1000 TABLET ORAL at 05:02

## 2024-11-14 RX ADMIN — Medication 5000 UNIT(S): at 17:49

## 2024-11-14 RX ADMIN — HYDRALAZINE HYDROCHLORIDE 25 MILLIGRAM(S): 10 TABLET ORAL at 22:35

## 2024-11-14 RX ADMIN — LACOSAMIDE 100 MILLIGRAM(S): 10 INJECTION INTRAVENOUS at 06:45

## 2024-11-14 RX ADMIN — Medication 1 APPLICATION(S): at 17:50

## 2024-11-14 RX ADMIN — ACETAMINOPHEN 500MG 650 MILLIGRAM(S): 500 TABLET, COATED ORAL at 07:01

## 2024-11-14 RX ADMIN — CHLORHEXIDINE GLUCONATE 1 APPLICATION(S): 1.2 RINSE ORAL at 06:46

## 2024-11-14 RX ADMIN — LABETALOL 200 MILLIGRAM(S): 100 TABLET, FILM COATED ORAL at 05:03

## 2024-11-14 RX ADMIN — HYDRALAZINE HYDROCHLORIDE 25 MILLIGRAM(S): 10 TABLET ORAL at 14:22

## 2024-11-14 RX ADMIN — Medication 250 MILLIGRAM(S): at 05:03

## 2024-11-14 RX ADMIN — Medication 50 MILLIGRAM(S): at 06:45

## 2024-11-14 RX ADMIN — LEVETIRACETAM 1000 MILLIGRAM(S): 1000 TABLET ORAL at 17:49

## 2024-11-14 RX ADMIN — Medication 1 PACKET(S): at 22:35

## 2024-11-14 RX ADMIN — ACETAMINOPHEN 500MG 650 MILLIGRAM(S): 500 TABLET, COATED ORAL at 05:16

## 2024-11-14 RX ADMIN — LABETALOL 200 MILLIGRAM(S): 100 TABLET, FILM COATED ORAL at 22:35

## 2024-11-14 NOTE — PROGRESS NOTE ADULT - SUBJECTIVE AND OBJECTIVE BOX
Detail Level: Generalized Detail Level: Zone SUBJECTIVE/OVERNIGHT EVENTS  Today is hospital day 14d. This morning patient was seen and examined at bedside, resting comfortably in bed. No acute or major events overnight.    HOSPITAL COURSE      CODE STATUS:    FAMILY COMMUNICATION  Contact date:  Name of person contacted:  Relationship to patient:  Communication details:    MEDICATIONS  STANDING MEDICATIONS  amantadine Syrup 50 milliGRAM(s) Oral <User Schedule>  amLODIPine   Tablet 10 milliGRAM(s) Oral daily  ascorbic acid 500 milliGRAM(s) Oral daily  bacitracin   Ointment 1 Application(s) Topical two times a day  bacitracin   Ointment 1 Application(s) Topical two times a day  chlorhexidine 2% Cloths 1 Application(s) Topical <User Schedule>  dextrose 5%. 1000 milliLiter(s) IV Continuous <Continuous>  dextrose 5%. 1000 milliLiter(s) IV Continuous <Continuous>  dextrose 50% Injectable 25 Gram(s) IV Push once  dextrose 50% Injectable 25 Gram(s) IV Push once  dextrose 50% Injectable 12.5 Gram(s) IV Push once  glucagon  Injectable 1 milliGRAM(s) IntraMuscular once  heparin   Injectable 5000 Unit(s) SubCutaneous every 12 hours  hydrALAZINE 25 milliGRAM(s) Oral every 8 hours  insulin lispro (ADMELOG) corrective regimen sliding scale   SubCutaneous every 8 hours  insulin lispro Injectable (ADMELOG) 4 Unit(s) SubCutaneous every 8 hours  labetalol 200 milliGRAM(s) Oral every 8 hours  lacosamide Solution 100 milliGRAM(s) Oral every 12 hours  levETIRAcetam   Injectable 750 milliGRAM(s) IV Push every 12 hours  psyllium Powder 1 Packet(s) Oral every 8 hours  saccharomyces boulardii 250 milliGRAM(s) Oral two times a day  zinc sulfate 220 milliGRAM(s) Oral daily    PRN MEDICATIONS  acetaminophen     Tablet .. 650 milliGRAM(s) Oral every 6 hours PRN  dextrose Oral Gel 15 Gram(s) Oral once PRN    VITALS  T(F): 97.3 (11-14-24 @ 04:00), Max: 97.9 (11-13-24 @ 16:13)  HR: 75 (11-14-24 @ 04:00) (70 - 83)  BP: 129/59 (11-14-24 @ 04:00) (114/53 - 129/59)  RR: 20 (11-13-24 @ 20:00) (18 - 20)  SpO2: 99% (11-14-24 @ 04:00) (99% - 100%)  POCT Blood Glucose.: 110 mg/dL (11-14-24 @ 05:49)  POCT Blood Glucose.: 99 mg/dL (11-14-24 @ 01:04)  POCT Blood Glucose.: 88 mg/dL (11-14-24 @ 00:05)  POCT Blood Glucose.: 126 mg/dL (11-13-24 @ 22:26)  POCT Blood Glucose.: 50 mg/dL (11-13-24 @ 21:58)  POCT Blood Glucose.: 55 mg/dL (11-13-24 @ 21:38)  POCT Blood Glucose.: 52 mg/dL (11-13-24 @ 21:36)  POCT Blood Glucose.: 125 mg/dL (11-13-24 @ 16:10)  POCT Blood Glucose.: 174 mg/dL (11-13-24 @ 13:42)    PHYSICAL EXAM  GENERAL  ( x ) NAD, lying in bed comfortably     (  ) obtunded     (  ) lethargic     (  ) somnolent    HEAD  ( x ) Atraumatic     (  ) hematoma     (  ) laceration (specify location:       )     NECK  ( x ) Supple     (  ) neck stiffness     (  ) nuchal rigidity     (  )  no JVD     (  ) JVD present ( -- cm)    HEART  Rate -->  ( x ) normal rate    (  ) bradycardic    (  ) tachycardic  Rhythm -->  ( x ) regular    (  ) regularly irregular    (  ) irregularly irregular  Murmurs -->  (  ) normal s1/s2    (  ) systolic murmur    (  ) diastolic murmur    (  ) continuous murmur     (  ) S3 present    (  ) S4 present    LUNGS  (x  )Unlabored respirations     (  ) tachypnea  (  ) B/L air entry     (  ) decreased breath sounds in:  (location     )    (  ) no adventitious sound     (  ) crackles     (  ) wheezing      (  ) rhonchi      (specify location:       )  (  ) chest wall tenderness (specify location:       )    ABDOMEN  ( x ) Soft     (  ) tense   |   (  ) nondistended     (  ) distended   |   (  ) +BS     (  ) hypoactive bowel sounds     (  ) hyperactive bowel sounds  (  ) nontender     (  ) RUQ tenderness     (  ) RLQ tenderness     (  ) LLQ tenderness     (  ) epigastric tenderness     (  ) diffuse tenderness  (  ) Splenomegaly      (  ) Hepatomegaly      (  ) Jaundice     (  ) ecchymosis     EXTREMITIES  (x  ) Normal     (  ) Rash     (  ) ecchymosis     (  ) varicose veins      (  ) pitting edema     (  ) non-pitting edema   (  ) ulceration     (  ) gangrene:     (location:     )    NERVOUS SYSTEM  (  ) A&Ox3     ( x ) confused     (  ) lethargic  CN II-XII:     (  ) Intact     (  ) focal deficits  (Specify:     )   Upper extremities:     (  ) strength X/5     (  ) focal deficit (specify:    )  Lower extremities:     (  ) strength  X/5    (  ) focal deficit (specify:    )    SKIN  ( x ) No rashes or lesions     (  ) maculopapular rash     (  ) pustules     (  ) vesicles     (  ) ulcer     (  ) ecchymosis     (specify location:     )    (  ) Indwelling Small Catheter   Date insterted:    Reason (  ) Critical illness     (  ) urinary retention    (  ) Accurate Ins/Outs Monitoring     (  ) CMO patient    (  ) Central Line  Date inserted:  Location: (  ) Right IJ   (  ) Left IJ   (  ) Right Fem   (  ) Left Fem    (  ) SPC  (  ) pigtail  (  ) PEG tube  (  ) colostomy  (  ) jejunostomy  (  ) U-Dall    LABS             7.2    8.50  )-----------( 369      ( 11-13-24 @ 04:41 )             22.6     140  |  107  |  35  -------------------------<  110   11-14-24 @ 06:07  4.6  |  20  |  0.9    Ca      8.9     11-14-24 @ 06:07  Phos   4.7     11-14-24 @ 06:07  Mg     2.0     11-14-24 @ 06:07    TPro  4.5  /  Alb  3.0  /  TBili  0.5  /  DBili  x   /  AST  20  /  ALT  14  /  AlkPhos  80  /  GGT  x     11-13-24 @ 04:41        Urinalysis Basic - ( 14 Nov 2024 06:07 )    Color: x / Appearance: x / SG: x / pH: x  Gluc: 110 mg/dL / Ketone: x  / Bili: x / Urobili: x   Blood: x / Protein: x / Nitrite: x   Leuk Esterase: x / RBC: x / WBC x   Sq Epi: x / Non Sq Epi: x / Bacteria: x          IMAGING

## 2024-11-14 NOTE — PROGRESS NOTE ADULT - ASSESSMENT
79F w/ PMHx of MDS, DM, HTN, CKD, recent admission for SDH s/p crani, evac and MMA embolization, presents with decreased MS, found to have stable SDH, admitted to NSICU for r/o status epilepticus         Plan:    #SDH  #R/o Status epilepticus  - s/p ketamine gtt trial to r/o CSD  - Neuro Checks Q4 NC  - CTH stable on 11/1  - MR Brain 11/5 - without acuity; stable SDH  - Keppra 750 gram q 12  - f/u keppra level from 11/8 (with improved CrCl)- so will maintain same dose - 11/4 keppra level was 98 (with Cr 1.5). f/u keppra level in am  - vimpat level 4.02-- increased vimpat to 100 mg q12 on 11/8  - EEg with triphasic waves, mild improvement of background- unchanged - will D/C   - c/w amantidine 50mg q 12 daily and monitor for clinical improvement   - Analgesia/Sedation: Tylenol PRN  - PT/OT  - mobility as tolerates   - ammonia wnl  - f/u neuro about neuro checks    #UTI  - UCx + enterococcus. s/p ampicillin  - 11/12 overnight temp of 100.3-> BCx was drawn. f/u results    #HTN   - SBP goal: 110 -  <150  - s/p Lasix 40mg IV X1   - c/w lisinopril 20mg , labetalol 200mg q 8; c/w norvasc 10mg  prn labetalol/hydralazine   - Echo  TTE Echo Complete w/o Contrast w/ Doppler (10.09.24 @ 12:41) >Normal global left ventricular systolic function. Left ventricular ejection fraction, by visual estimation, is 65 to 70%  - EKG- QTc - NL - 452       #diarrhea- - may be abx-associated (abx completing recently) vs feed related-> resolving  #dysphagia s/p PEG on 11/12  - Darren feeding tube for bolus feeds per nutrition- f/u recs  - Banatrol + metamucil  q 8 started q 8 11/11   - rectal tube 11/3- D/C in am   - lactobacillus --> changed to florastor as per nutrition  - f/u stool studies  - started feeds 11/13 am. advance as tolerated. f/u refeeding labs in am    #MARCO on CKD improving  primafit/bladder scan q 6  - Sodium Goal: 135 - 145  - Replete as needed.  - Mg > 2  - trend Cr    #Pre- diabetic   - POCT, ISS,  -180  - A1c: 6  - TSH: 2.63    ID: s/p UTI  - MRSA nasal neg   s/p ampicillin for enterococcus UTI completed  - Normothermia    # MDS  #Anemia  - anemia to 6.5, transfusing 1 unit on 11/7 with improved Hb to 8.8-->  7.8--> 7.1 (likely from MDS)  - h/o Chronic blood transfusions  - SCDs, chemoppx  - LUE venous doppler for swollen/indurated L forearm-  - Superficial thrombophlebitis of the left cephalic vein elevate LUE, topical antiseptic and cold packs  - neurovascular checks of LUE q4  - cpk normal  - folows OP Dr Wade        #MISC  - Activity: IAT  - CODE: FULL  - Diet: regular  - Dispo: DGTF when neuro checks can be decreased per neuro       79F w/ PMHx of MDS, DM, HTN, CKD, recent admission for SDH s/p crani, evac and MMA embolization, presents with decreased MS, found to have stable SDH, admitted to NSICU for r/o status epilepticus         Plan:    #SDH  #R/o Status epilepticus  - s/p ketamine gtt trial to r/o CSD  - Neuro Checks Q4 NC  - CTH stable on 11/1  - MR Brain 11/5 - without acuity; stable SDH  - Keppra 750 gram q 12  - f/u keppra level from 11/8 (with improved CrCl)- so will maintain same dose - 11/4 keppra level was 98 (with Cr 1.5).   - vimpat level 4.02-- increased vimpat to 100 mg q12 on 11/8  - EEg with triphasic waves, mild improvement of background- unchanged - will D/C   - c/w amantidine 50mg q 12 daily and monitor for clinical improvement   - Analgesia/Sedation: Tylenol PRN  - PT/OT  - mobility as tolerates   - ammonia wnl  - rEEG 11/13: There were indicators of  right hemispheric slowing and  epileptic discharges, present as slow spike and PLEDs. Findings consistent with diffuse electrocerebral dysfunction secondary to nonspecific etiology.   - per neuro: vEEG. increase keppra to 1000mg q12h      #UTI  - UCx + enterococcus. s/p ampicillin  - 11/12 overnight temp of 100.3-> BCx was drawn. f/u results    #HTN   - SBP goal: 110 -  <150  - s/p Lasix 40mg IV X1   - c/w lisinopril 20mg , labetalol 200mg q 8; c/w norvasc 10mg  prn labetalol/hydralazine   - Echo  TTE Echo Complete w/o Contrast w/ Doppler (10.09.24 @ 12:41) >Normal global left ventricular systolic function. Left ventricular ejection fraction, by visual estimation, is 65 to 70%  - EKG- QTc - NL - 452       #diarrhea- - may be abx-associated (abx completing recently) vs feed related-> resolving  #dysphagia s/p PEG on 11/12  - Darren feeding tube for bolus feeds per nutrition- f/u recs  - Banatrol + metamucil  q 8 started q 8 11/11   - rectal tube 11/3- D/C in am   - lactobacillus --> changed to florastor as per nutrition  - f/u stool studies  - started feeds 11/13 am. advance as tolerated. f/u refeeding labs in am    #MARCO on CKD improving  primafit/bladder scan q 6  - Sodium Goal: 135 - 145  - Replete as needed.  - Mg > 2  - trend Cr    #Pre- diabetic   - POCT, ISS,  -180  - A1c: 6  - TSH: 2.63    ID: s/p UTI  - MRSA nasal neg   s/p ampicillin for enterococcus UTI completed  - Normothermia    # MDS  #Anemia  - anemia to 6.5, transfusing 1 unit on 11/7 with improved Hb to 8.8-->  7.8--> 7.1 (likely from MDS)  - h/o Chronic blood transfusions  - SCDs, chemoppx  - LUE venous doppler for swollen/indurated L forearm-  - Superficial thrombophlebitis of the left cephalic vein elevate LUE, topical antiseptic and cold packs  - neurovascular checks of LUE q4  - cpk normal  - folows OP Dr Wade        #MISC  - Activity: IAT  - CODE: FULL  - Diet: regular  - Dispo: DGTF when neuro checks can be decreased per neuro

## 2024-11-14 NOTE — PROGRESS NOTE ADULT - SUBJECTIVE AND OBJECTIVE BOX
SIMONA KUHN  79y Female    CHIEF COMPLAINT:    Patient is a 79y old  Female who presents with a chief complaint of SDH, Seizures (2024 07:59)    INTERVAL HPI/OVERNIGHT EVENTS:    Patient seen and examined. No acute events overnight. on room air     ROS: All other systems are negative.    Vital Signs:    T(F): 98.1 (24 @ 08:00), Max: 98.1 (24 @ 08:00)  HR: 69 (24 @ 08:00) (69 - 75)  BP: 113/56 (24 @ 08:00) (113/56 - 129/59)  RR: 18 (24 @ 08:00) (18 - 20)  SpO2: 89% (24 @ 08:00) (89% - 100%)    2024 07:01  -  2024 07:00  --------------------------------------------------------  IN: 830 mL / OUT: 850 mL / NET: -20 mL    Daily Weight in k (2024 00:00)    POCT Blood Glucose.: 110 mg/dL (2024 05:49)  POCT Blood Glucose.: 99 mg/dL (2024 01:04)  POCT Blood Glucose.: 88 mg/dL (2024 00:05)  POCT Blood Glucose.: 126 mg/dL (2024 22:26)  POCT Blood Glucose.: 50 mg/dL (2024 21:58)  POCT Blood Glucose.: 55 mg/dL (2024 21:38)  POCT Blood Glucose.: 52 mg/dL (2024 21:36)  POCT Blood Glucose.: 125 mg/dL (2024 16:10)  POCT Blood Glucose.: 174 mg/dL (2024 13:42)    PHYSICAL EXAM:    GENERAL:  NAD chronically ill appearing   SKIN: No rashes or lesions  HEENT: Atraumatic. Normocephalic.   NECK: Supple, No JVD. No lymphadenopathy.  PULMONARY: CTA B/L. No wheezing. No rales  CVS: Normal S1, S2. Rate and Rhythm are regular   ABDOMEN/GI: Soft, Nontender, Nondistended   MSK:  No clubbing or cyanosis   NEUROLOGIC: opens eyes to voice, does not follow commands   PSYCH: lethargic, arousable     Consultant(s) Notes Reviewed:  [x ] YES  [ ] NO  Care Discussed with Consultants/Other Providers [ x] YES  [ ] NO    LABS:                        7.2    8.50  )-----------( 369      ( 2024 04:41 )             22.6     140  |  107  |  35[H]  ----------------------------<  110[H]  4.6   |  20  |  0.9    Ca    8.9      2024 06:07  Phos  4.7     11-14  Mg     2.0     11-14    TPro  4.5[L]  /  Alb  3.0[L]  /  TBili  0.5  /  DBili  x   /  AST  20  /  ALT  14  /  AlkPhos  80  11-13    RADIOLOGY & ADDITIONAL TESTS:  Imaging or report Personally Reviewed:  [x] YES  [ ] NO  EKG reviewed: [x] YES  [ ] NO    Medications:  Standing  amantadine Syrup 50 milliGRAM(s) Oral <User Schedule>  amLODIPine   Tablet 10 milliGRAM(s) Oral daily  ascorbic acid 500 milliGRAM(s) Oral daily  bacitracin   Ointment 1 Application(s) Topical two times a day  bacitracin   Ointment 1 Application(s) Topical two times a day  chlorhexidine 2% Cloths 1 Application(s) Topical <User Schedule>  dextrose 5%. 1000 milliLiter(s) IV Continuous <Continuous>  dextrose 5%. 1000 milliLiter(s) IV Continuous <Continuous>  dextrose 50% Injectable 25 Gram(s) IV Push once  dextrose 50% Injectable 12.5 Gram(s) IV Push once  dextrose 50% Injectable 25 Gram(s) IV Push once  glucagon  Injectable 1 milliGRAM(s) IntraMuscular once  heparin   Injectable 5000 Unit(s) SubCutaneous every 12 hours  hydrALAZINE 25 milliGRAM(s) Oral every 8 hours  insulin lispro (ADMELOG) corrective regimen sliding scale   SubCutaneous every 8 hours  insulin lispro Injectable (ADMELOG) 4 Unit(s) SubCutaneous every 8 hours  labetalol 200 milliGRAM(s) Oral every 8 hours  lacosamide Solution 100 milliGRAM(s) Oral every 12 hours  levETIRAcetam   Injectable 750 milliGRAM(s) IV Push every 12 hours  psyllium Powder 1 Packet(s) Oral every 8 hours  saccharomyces boulardii 250 milliGRAM(s) Oral two times a day  zinc sulfate 220 milliGRAM(s) Oral daily    PRN Meds  acetaminophen     Tablet .. 650 milliGRAM(s) Oral every 6 hours PRN  dextrose Oral Gel 15 Gram(s) Oral once PRN

## 2024-11-14 NOTE — PROGRESS NOTE ADULT - ASSESSMENT
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 While in NSICU, patient underwent MMA embolization and post operatively was stable, however, on 10/16 she was noted to have leaking from her crani site. STAT CTH showed increase in SDH and extracalvarial collection, NSG removed 25cc of blood and was re-sutured. Patient was then downgraded to the floor, was started on zosyn for UTI, and was discharged to short term rehab on 10/27. Patient returns to [facility] due to worsening lethargy, admitted to NCCU due to concern for seizures, now downgraded to SDU    Recently diagnosed SDH  Concern for Status epilepticus  Dysphagia s/p PEG on 11/12  - s/p ketamine gtt in NCCU  - MR Brain 11/5 - without acuity; stable SDH  - serial CTH stable   - SBP goal: 110 -  <150  - s/p EEG, currently on Keppra 750 Q12H and vimpat 100 mg q12, neurology following   - c/w amantidine 50mg q 12 daily and monitor for clinical improvement   - s/p PEG placement 11/12, so far tolerating feeds   - hypoglycemic yesterday. c/w ISS only    UTI with low grade temps - resolved   - UCx + enterococcus. s/p ampicillin  - 11/12 overnight temp of 100.3-> repeat blood cx pending. no further fevers   - monitor diarrhea     HTN   - SBP goal: 110 -  <150  - - Echo  10/9/24: Normal global left ventricular systolic function. Left ventricular ejection fraction, by visual estimation, is 65 to 70%  - c/w lisinopril 20mg , labetalol 200mg q 8; c/w norvasc 10mg. DC IV antihyperntesives     MARCO on CKD improving  - monitor BMP, c/w bladder scans q8H    MDS with Anemia, receives PRN PRBC  - s/p 1u PRBC transfusion 11/7   - monitor CBC< transfuse if Hb <4  - OP fu with Dr Wade     Overall prognosis is guarded  Pending: neurochecks per neurology, monitor fever curve/off abx, fu blood culture, labs,cw  tube feeds, monitor diarrhea    Patient seen at bedside, total time spent to evaluate and treat the patient's acute illness and chronic medical conditions as well as time spent reviewing prior records, labs, radiology, documenting in electronic medical records,  discussing medical plan with   medical team was more than 40 minutes with >50% of time spent face to face with patient, discussing with patient/family as well as coordination of care

## 2024-11-14 NOTE — EEG REPORT - NS EEG TEXT BOX
[facility] Department of Neurology         Inpatient Routine-Electroencephalography Report    Patient Name:	SIMONA KUHN  :	1945  MRN:	-    Study Start Date/Time:	2024, 3:38:57 PM    End Time (if applicable):      Brief Clinical History:  SIMONA KUHN is a 79 year old Female; study performed to investigate for seizures or markers of epilepsy.   Technologist notes: PT COMPL. AMS    Diagnosis Code:  R56.9 convulsions/seizure  CPT:   17584 (awake/drowsy)     Pertinent Medication:  n/a    Acquisition Details:  Electroencephalography was acquired using a minimum of 21 channels on an Open[employer] Neurology system v 9.3.1 with electrode placement according to the standard International 10-20 system following ACNS (American Clinical Neurophysiology Society) guidelines.  Anterior temporal T1 and T2 electrodes were utilized whenever possible.  The XLTEK automated spike & seizure detections were all reviewed in detail, in addition to the entire raw EEG.    Findings:  Background:  continuous, with predominantly theta frequencies.  Generalized Slowing:  Frequent  polymorhpic theta   Symmetry/Focality: Abundant (50-89%)  right hemispheric polymorphic and semi-rhythmic delta slowing.      Voltage:  Normal (20+ uV)  Organization:  Rudimentary  Posterior Dominant Rhythm:  No clear PDR, 5-6 Hz theta   Sleep:  Absent.  Variability:   Yes		Reactivity:  Yes    Spontaneous Activity:  Abundant ( >1/10 sec) right hemispheric 1-2 Hz slow spike wave and LPDs.   Events:  1)	No discrete  electrographic seizures or significant clinical events occurred during this study.  Provocations:  •	Hyperventilation: was not performed.  •	Photic stimulation: was not performed.  FINAL Impression:  Abnormalawake and/or drowsy routine EEG  1)	 There was diffuse slowing of electrographic activity  2)	 Abundant ( >1/10 sec) right hemispheric 1-2 Hz slow spike wave and LPDs.  3)	Abundant (50-89%)  right hemispheric polymorphic and semi-rhythmic delta slowing.          Final Clinical Correlation:  1)	There were indicators of  right hemispheric slowing and  epileptic discharges, present as slow spike and PLEDs.   2)	Findings consistent with diffuse electrocerebral dysfunction secondary to nonspecific etiology          Lurdes Flores MD   Attending Neurologist, [facility]

## 2024-11-15 LAB
ANION GAP SERPL CALC-SCNC: 9 MMOL/L — SIGNIFICANT CHANGE UP (ref 7–14)
ANION GAP SERPL CALC-SCNC: 9 MMOL/L — SIGNIFICANT CHANGE UP (ref 7–14)
BASOPHILS # BLD AUTO: 0.07 K/UL — SIGNIFICANT CHANGE UP (ref 0–0.2)
BASOPHILS NFR BLD AUTO: 0.6 % — SIGNIFICANT CHANGE UP (ref 0–1)
BUN SERPL-MCNC: 34 MG/DL — HIGH (ref 10–20)
BUN SERPL-MCNC: 34 MG/DL — HIGH (ref 10–20)
CALCIUM SERPL-MCNC: 8.6 MG/DL — SIGNIFICANT CHANGE UP (ref 8.4–10.4)
CALCIUM SERPL-MCNC: 8.7 MG/DL — SIGNIFICANT CHANGE UP (ref 8.4–10.5)
CHLORIDE SERPL-SCNC: 107 MMOL/L — SIGNIFICANT CHANGE UP (ref 98–110)
CHLORIDE SERPL-SCNC: 110 MMOL/L — SIGNIFICANT CHANGE UP (ref 98–110)
CO2 SERPL-SCNC: 22 MMOL/L — SIGNIFICANT CHANGE UP (ref 17–32)
CO2 SERPL-SCNC: 22 MMOL/L — SIGNIFICANT CHANGE UP (ref 17–32)
CREAT SERPL-MCNC: 0.9 MG/DL — SIGNIFICANT CHANGE UP (ref 0.7–1.5)
CREAT SERPL-MCNC: 1 MG/DL — SIGNIFICANT CHANGE UP (ref 0.7–1.5)
EGFR: 58 ML/MIN/1.73M2 — LOW
EGFR: 65 ML/MIN/1.73M2 — SIGNIFICANT CHANGE UP
EOSINOPHIL # BLD AUTO: 0.23 K/UL — SIGNIFICANT CHANGE UP (ref 0–0.7)
EOSINOPHIL NFR BLD AUTO: 1.8 % — SIGNIFICANT CHANGE UP (ref 0–8)
GLUCOSE BLDC GLUCOMTR-MCNC: 101 MG/DL — HIGH (ref 70–99)
GLUCOSE BLDC GLUCOMTR-MCNC: 127 MG/DL — HIGH (ref 70–99)
GLUCOSE BLDC GLUCOMTR-MCNC: 128 MG/DL — HIGH (ref 70–99)
GLUCOSE BLDC GLUCOMTR-MCNC: 128 MG/DL — HIGH (ref 70–99)
GLUCOSE BLDC GLUCOMTR-MCNC: 149 MG/DL — HIGH (ref 70–99)
GLUCOSE SERPL-MCNC: 130 MG/DL — HIGH (ref 70–99)
GLUCOSE SERPL-MCNC: 91 MG/DL — SIGNIFICANT CHANGE UP (ref 70–99)
HCT VFR BLD CALC: 23.6 % — LOW (ref 37–47)
HGB BLD-MCNC: 7.7 G/DL — LOW (ref 12–16)
IMM GRANULOCYTES NFR BLD AUTO: 0.6 % — HIGH (ref 0.1–0.3)
LYMPHOCYTES # BLD AUTO: 1.04 K/UL — LOW (ref 1.2–3.4)
LYMPHOCYTES # BLD AUTO: 8.3 % — LOW (ref 20.5–51.1)
MAGNESIUM SERPL-MCNC: 1.9 MG/DL — SIGNIFICANT CHANGE UP (ref 1.8–2.4)
MAGNESIUM SERPL-MCNC: 2 MG/DL — SIGNIFICANT CHANGE UP (ref 1.8–2.4)
MCHC RBC-ENTMCNC: 30 PG — SIGNIFICANT CHANGE UP (ref 27–31)
MCHC RBC-ENTMCNC: 32.6 G/DL — SIGNIFICANT CHANGE UP (ref 32–37)
MCV RBC AUTO: 91.8 FL — SIGNIFICANT CHANGE UP (ref 81–99)
MONOCYTES # BLD AUTO: 1.03 K/UL — HIGH (ref 0.1–0.6)
MONOCYTES NFR BLD AUTO: 8.2 % — SIGNIFICANT CHANGE UP (ref 1.7–9.3)
NEUTROPHILS # BLD AUTO: 10.05 K/UL — HIGH (ref 1.4–6.5)
NEUTROPHILS NFR BLD AUTO: 80.5 % — HIGH (ref 42.2–75.2)
NRBC # BLD: 0 /100 WBCS — SIGNIFICANT CHANGE UP (ref 0–0)
PHOSPHATE SERPL-MCNC: 3.8 MG/DL — SIGNIFICANT CHANGE UP (ref 2.1–4.9)
PLATELET # BLD AUTO: 435 K/UL — HIGH (ref 130–400)
PMV BLD: 10.3 FL — SIGNIFICANT CHANGE UP (ref 7.4–10.4)
POTASSIUM SERPL-MCNC: 4.5 MMOL/L — SIGNIFICANT CHANGE UP (ref 3.5–5)
POTASSIUM SERPL-MCNC: 4.6 MMOL/L — SIGNIFICANT CHANGE UP (ref 3.5–5)
POTASSIUM SERPL-SCNC: 4.5 MMOL/L — SIGNIFICANT CHANGE UP (ref 3.5–5)
POTASSIUM SERPL-SCNC: 4.6 MMOL/L — SIGNIFICANT CHANGE UP (ref 3.5–5)
RBC # BLD: 2.57 M/UL — LOW (ref 4.2–5.4)
RBC # FLD: 15.5 % — HIGH (ref 11.5–14.5)
SODIUM SERPL-SCNC: 138 MMOL/L — SIGNIFICANT CHANGE UP (ref 135–146)
SODIUM SERPL-SCNC: 141 MMOL/L — SIGNIFICANT CHANGE UP (ref 135–146)
WBC # BLD: 12.49 K/UL — HIGH (ref 4.8–10.8)
WBC # FLD AUTO: 12.49 K/UL — HIGH (ref 4.8–10.8)

## 2024-11-15 PROCEDURE — 95720 EEG PHY/QHP EA INCR W/VEEG: CPT

## 2024-11-15 PROCEDURE — 99232 SBSQ HOSP IP/OBS MODERATE 35: CPT

## 2024-11-15 PROCEDURE — 99233 SBSQ HOSP IP/OBS HIGH 50: CPT

## 2024-11-15 PROCEDURE — 93010 ELECTROCARDIOGRAM REPORT: CPT

## 2024-11-15 RX ORDER — LACOSAMIDE 10 MG/ML
200 INJECTION INTRAVENOUS EVERY 12 HOURS
Refills: 0 | Status: DISCONTINUED | OUTPATIENT
Start: 2024-11-15 | End: 2024-11-20

## 2024-11-15 RX ORDER — LACOSAMIDE 10 MG/ML
100 INJECTION INTRAVENOUS ONCE
Refills: 0 | Status: DISCONTINUED | OUTPATIENT
Start: 2024-11-15 | End: 2024-11-15

## 2024-11-15 RX ADMIN — Medication 250 MILLIGRAM(S): at 05:26

## 2024-11-15 RX ADMIN — LEVETIRACETAM 1000 MILLIGRAM(S): 1000 TABLET ORAL at 05:24

## 2024-11-15 RX ADMIN — LABETALOL 200 MILLIGRAM(S): 100 TABLET, FILM COATED ORAL at 05:25

## 2024-11-15 RX ADMIN — LABETALOL 200 MILLIGRAM(S): 100 TABLET, FILM COATED ORAL at 21:55

## 2024-11-15 RX ADMIN — AMLODIPINE BESYLATE 10 MILLIGRAM(S): 10 TABLET ORAL at 05:25

## 2024-11-15 RX ADMIN — Medication 5000 UNIT(S): at 18:19

## 2024-11-15 RX ADMIN — Medication 1 PACKET(S): at 21:55

## 2024-11-15 RX ADMIN — Medication 1 PACKET(S): at 13:56

## 2024-11-15 RX ADMIN — LABETALOL 200 MILLIGRAM(S): 100 TABLET, FILM COATED ORAL at 13:55

## 2024-11-15 RX ADMIN — Medication 220 MILLIGRAM(S): at 12:05

## 2024-11-15 RX ADMIN — Medication 1 APPLICATION(S): at 05:26

## 2024-11-15 RX ADMIN — LACOSAMIDE 140 MILLIGRAM(S): 10 INJECTION INTRAVENOUS at 18:23

## 2024-11-15 RX ADMIN — Medication 50 MILLIGRAM(S): at 13:55

## 2024-11-15 RX ADMIN — HYDRALAZINE HYDROCHLORIDE 25 MILLIGRAM(S): 10 TABLET ORAL at 05:25

## 2024-11-15 RX ADMIN — Medication 1 APPLICATION(S): at 18:19

## 2024-11-15 RX ADMIN — Medication 250 MILLIGRAM(S): at 18:20

## 2024-11-15 RX ADMIN — LEVETIRACETAM 1000 MILLIGRAM(S): 1000 TABLET ORAL at 18:14

## 2024-11-15 RX ADMIN — CHLORHEXIDINE GLUCONATE 1 APPLICATION(S): 1.2 RINSE ORAL at 06:00

## 2024-11-15 RX ADMIN — LACOSAMIDE 100 MILLIGRAM(S): 10 INJECTION INTRAVENOUS at 12:04

## 2024-11-15 RX ADMIN — Medication 1 APPLICATION(S): at 18:20

## 2024-11-15 RX ADMIN — HYDRALAZINE HYDROCHLORIDE 25 MILLIGRAM(S): 10 TABLET ORAL at 21:55

## 2024-11-15 RX ADMIN — LACOSAMIDE 100 MILLIGRAM(S): 10 INJECTION INTRAVENOUS at 05:57

## 2024-11-15 RX ADMIN — Medication 50 MILLIGRAM(S): at 05:23

## 2024-11-15 RX ADMIN — HYDRALAZINE HYDROCHLORIDE 25 MILLIGRAM(S): 10 TABLET ORAL at 13:55

## 2024-11-15 RX ADMIN — Medication 500 MILLIGRAM(S): at 12:05

## 2024-11-15 RX ADMIN — Medication 1 PACKET(S): at 05:23

## 2024-11-15 RX ADMIN — Medication 5000 UNIT(S): at 05:24

## 2024-11-15 NOTE — RAPID RESPONSE TEAM SUMMARY - NSSITUATIONBACKGROUNDRRT_GEN_ALL_CORE
Rapid called for Heart rate of 200, on monitor non specific artifact rhythm noted, ecg ordered showed sinus rhythm with heart rate of 77. ECG leads changed with reflected the actual ecg. Reminder of vital signs stable, exam unchanged. Basic metabolic panel ordered, possible EP consult tomorrow morning if needed

## 2024-11-15 NOTE — PROGRESS NOTE ADULT - SUBJECTIVE AND OBJECTIVE BOX
Neurology Progress Note    Interval History:  multiple electrographic seizures recorded on EEG overnight. patient still somnolent no GTC of clinical seizure activity noted.       Medications:  acetaminophen     Tablet .. 650 milliGRAM(s) Oral every 6 hours PRN  amantadine Syrup 50 milliGRAM(s) Oral <User Schedule>  amLODIPine   Tablet 10 milliGRAM(s) Oral daily  ascorbic acid 500 milliGRAM(s) Oral daily  bacitracin   Ointment 1 Application(s) Topical two times a day  bacitracin   Ointment 1 Application(s) Topical two times a day  chlorhexidine 2% Cloths 1 Application(s) Topical <User Schedule>  dextrose 5%. 1000 milliLiter(s) IV Continuous <Continuous>  dextrose 5%. 1000 milliLiter(s) IV Continuous <Continuous>  dextrose 50% Injectable 25 Gram(s) IV Push once  dextrose 50% Injectable 25 Gram(s) IV Push once  dextrose 50% Injectable 12.5 Gram(s) IV Push once  dextrose Oral Gel 15 Gram(s) Oral once PRN  glucagon  Injectable 1 milliGRAM(s) IntraMuscular once  heparin   Injectable 5000 Unit(s) SubCutaneous every 12 hours  hydrALAZINE 25 milliGRAM(s) Oral every 8 hours  insulin lispro (ADMELOG) corrective regimen sliding scale   SubCutaneous every 8 hours  labetalol 200 milliGRAM(s) Oral every 8 hours  levETIRAcetam   Injectable 1000 milliGRAM(s) IV Push every 12 hours  psyllium Powder 1 Packet(s) Oral every 8 hours  saccharomyces boulardii 250 milliGRAM(s) Oral two times a day  zinc sulfate 220 milliGRAM(s) Oral daily      Vital Signs Last 24 Hrs  T(C): 36.6 (15 Nov 2024 08:06), Max: 36.8 (14 Nov 2024 20:08)  T(F): 97.8 (15 Nov 2024 08:06), Max: 98.3 (14 Nov 2024 20:08)  HR: 78 (15 Nov 2024 08:06) (72 - 79)  BP: 138/59 (15 Nov 2024 08:06) (117/55 - 142/61)  BP(mean): --  RR: 18 (15 Nov 2024 08:06) (18 - 18)  SpO2: 96% (15 Nov 2024 08:06) (96% - 100%)        Neurologic exam)  -Mental status: moans to noxious stimuli but does not open eyes  -Cranial nerves:  II: Blink-to-threat: not present  II, III: Pupillary responses: equal and reactive to light  III, IV, VI: primary gaze midline, +VOR  VII: Face is symmetric  V,VII: positive Corneal reflex, no facial asymmetry, + grimace response  IX,X Gag reflex: present  Sensory: patient will moan to noxious stimuli in all extremities and withdraws b/l UE to pain  Motor: no Spontaneous movement, no myoclonus  Reflexes: 2/4 DTR, mute Plantar response, no posturing reflexes, no clonus    Labs:  CBC Full  -  ( 15 Nov 2024 04:32 )  WBC Count : 12.49 K/uL  RBC Count : 2.57 M/uL  Hemoglobin : 7.7 g/dL  Hematocrit : 23.6 %  Platelet Count - Automated : 435 K/uL  Mean Cell Volume : 91.8 fL  Mean Cell Hemoglobin : 30.0 pg  Mean Cell Hemoglobin Concentration : 32.6 g/dL  Auto Neutrophil # : 10.05 K/uL  Auto Lymphocyte # : 1.04 K/uL  Auto Monocyte # : 1.03 K/uL  Auto Eosinophil # : 0.23 K/uL  Auto Basophil # : 0.07 K/uL  Auto Neutrophil % : 80.5 %  Auto Lymphocyte % : 8.3 %  Auto Monocyte % : 8.2 %  Auto Eosinophil % : 1.8 %  Auto Basophil % : 0.6 %    11-15    141  |  110  |  34[H]  ----------------------------<  91  4.5   |  22  |  0.9    Ca    8.7      15 Nov 2024 04:32  Phos  3.8     11-15  Mg     2.0     11-15          Urinalysis Basic - ( 15 Nov 2024 04:32 )    Color: x / Appearance: x / SG: x / pH: x  Gluc: 91 mg/dL / Ketone: x  / Bili: x / Urobili: x   Blood: x / Protein: x / Nitrite: x   Leuk Esterase: x / RBC: x / WBC x   Sq Epi: x / Non Sq Epi: x / Bacteria: x        RADIOLOGY & ADDITIONAL TESTS:

## 2024-11-15 NOTE — PROGRESS NOTE ADULT - SUBJECTIVE AND OBJECTIVE BOX
SUBJECTIVE/OVERNIGHT EVENTS  Today is hospital day 15d. This morning patient was seen and examined at bedside, resting comfortably in bed. No acute or major events overnight.    HOSPITAL COURSE      CODE STATUS:    FAMILY COMMUNICATION  Contact date:  Name of person contacted:  Relationship to patient:  Communication details:    MEDICATIONS  STANDING MEDICATIONS  amantadine Syrup 50 milliGRAM(s) Oral <User Schedule>  amLODIPine   Tablet 10 milliGRAM(s) Oral daily  ascorbic acid 500 milliGRAM(s) Oral daily  bacitracin   Ointment 1 Application(s) Topical two times a day  bacitracin   Ointment 1 Application(s) Topical two times a day  chlorhexidine 2% Cloths 1 Application(s) Topical <User Schedule>  dextrose 5%. 1000 milliLiter(s) IV Continuous <Continuous>  dextrose 5%. 1000 milliLiter(s) IV Continuous <Continuous>  dextrose 50% Injectable 25 Gram(s) IV Push once  dextrose 50% Injectable 25 Gram(s) IV Push once  dextrose 50% Injectable 12.5 Gram(s) IV Push once  glucagon  Injectable 1 milliGRAM(s) IntraMuscular once  heparin   Injectable 5000 Unit(s) SubCutaneous every 12 hours  hydrALAZINE 25 milliGRAM(s) Oral every 8 hours  insulin lispro (ADMELOG) corrective regimen sliding scale   SubCutaneous every 8 hours  labetalol 200 milliGRAM(s) Oral every 8 hours  lacosamide Solution 100 milliGRAM(s) Oral every 12 hours  levETIRAcetam   Injectable 1000 milliGRAM(s) IV Push every 12 hours  psyllium Powder 1 Packet(s) Oral every 8 hours  saccharomyces boulardii 250 milliGRAM(s) Oral two times a day  zinc sulfate 220 milliGRAM(s) Oral daily    PRN MEDICATIONS  acetaminophen     Tablet .. 650 milliGRAM(s) Oral every 6 hours PRN  dextrose Oral Gel 15 Gram(s) Oral once PRN    VITALS  T(F): 97.8 (11-15-24 @ 08:06), Max: 98.3 (11-14-24 @ 20:08)  HR: 78 (11-15-24 @ 08:06) (72 - 79)  BP: 138/59 (11-15-24 @ 08:06) (117/55 - 142/61)  RR: 18 (11-15-24 @ 08:06) (18 - 18)  SpO2: 96% (11-15-24 @ 08:06) (96% - 100%)  POCT Blood Glucose.: 127 mg/dL (11-15-24 @ 07:33)  POCT Blood Glucose.: 101 mg/dL (11-15-24 @ 05:21)  POCT Blood Glucose.: 156 mg/dL (11-14-24 @ 22:47)  POCT Blood Glucose.: 168 mg/dL (11-14-24 @ 21:01)  POCT Blood Glucose.: 145 mg/dL (11-14-24 @ 14:16)    PHYSICAL EXAM  GENERAL  (x  ) NAD, lying in bed comfortably     (  ) obtunded     (  ) lethargic     (  ) somnolent    HEAD  (x  ) Atraumatic     (  ) hematoma     (  ) laceration (specify location:       )     NECK  (x  ) Supple     (  ) neck stiffness     (  ) nuchal rigidity     (  )  no JVD     (  ) JVD present ( -- cm)    HEART  Rate -->  (x  ) normal rate    (  ) bradycardic    (  ) tachycardic  Rhythm -->  ( x ) regular    (  ) regularly irregular    (  ) irregularly irregular  Murmurs -->  (  ) normal s1/s2    (  ) systolic murmur    (  ) diastolic murmur    (  ) continuous murmur     (  ) S3 present    (  ) S4 present    LUNGS  ( x )Unlabored respirations     (  ) tachypnea  (  ) B/L air entry     (  ) decreased breath sounds in:  (location     )    (  ) no adventitious sound     (  ) crackles     (  ) wheezing      (  ) rhonchi      (specify location:       )  (  ) chest wall tenderness (specify location:       )    ABDOMEN  ( x ) Soft     (  ) tense   |   (  ) nondistended     (  ) distended   |   (  ) +BS     (  ) hypoactive bowel sounds     (  ) hyperactive bowel sounds  (  ) nontender     (  ) RUQ tenderness     (  ) RLQ tenderness     (  ) LLQ tenderness     (  ) epigastric tenderness     (  ) diffuse tenderness  (  ) Splenomegaly      (  ) Hepatomegaly      (  ) Jaundice     (  ) ecchymosis     EXTREMITIES  ( x ) Normal     (  ) Rash     (  ) ecchymosis     (  ) varicose veins      (  ) pitting edema     (  ) non-pitting edema   (  ) ulceration     (  ) gangrene:     (location:     )    NERVOUS SYSTEM  (  ) A&Ox3     ( x ) confused     (  ) lethargic  CN II-XII:     (  ) Intact     (  ) focal deficits  (Specify:     )   Upper extremities:     (  ) strength X/5     (  ) focal deficit (specify:    )  Lower extremities:     (  ) strength  X/5    (  ) focal deficit (specify:    )    SKIN  (  ) No rashes or lesions     (  ) maculopapular rash     (  ) pustules     (  ) vesicles     (  ) ulcer     (  ) ecchymosis     (specify location:     )    (  ) Indwelling Small Catheter   Date insterted:    Reason (  ) Critical illness     (  ) urinary retention    (  ) Accurate Ins/Outs Monitoring     (  ) CMO patient    (  ) Central Line  Date inserted:  Location: (  ) Right IJ   (  ) Left IJ   (  ) Right Fem   (  ) Left Fem    (  ) SPC  (  ) pigtail  (  ) PEG tube  (  ) colostomy  (  ) jejunostomy  (  ) U-Dall    LABS             7.7    12.49 )-----------( 435      ( 11-15-24 @ 04:32 )             23.6     141  |  110  |  34  -------------------------<  91   11-15-24 @ 04:32  4.5  |  22  |  0.9    Ca      8.7     11-15-24 @ 04:32  Phos   3.8     11-15-24 @ 04:32  Mg     2.0     11-15-24 @ 04:32          Urinalysis Basic - ( 15 Nov 2024 04:32 )    Color: x / Appearance: x / SG: x / pH: x  Gluc: 91 mg/dL / Ketone: x  / Bili: x / Urobili: x   Blood: x / Protein: x / Nitrite: x   Leuk Esterase: x / RBC: x / WBC x   Sq Epi: x / Non Sq Epi: x / Bacteria: x          Culture - Blood (collected 13 Nov 2024 05:00)  Source: .Blood BLOOD  Preliminary Report (14 Nov 2024 15:01):    No growth at 24 hours      IMAGING

## 2024-11-15 NOTE — EEG REPORT - NS EEG TEXT BOX
Epilepsy Attending Note:     SIMONA KUHN    79y Female  MRN [id number]    Vital Signs Last 24 Hrs  T(C): 36.4 (15 Nov 2024 11:00), Max: 36.8 (14 Nov 2024 20:08)  T(F): 97.6 (15 Nov 2024 11:00), Max: 98.3 (14 Nov 2024 20:08)  HR: 78 (15 Nov 2024 08:06) (72 - 112)  BP: 155/79 (15 Nov 2024 11:00) (115/52 - 155/79)  BP(mean): 110 (15 Nov 2024 11:00) (75 - 110)  RR: 20 (15 Nov 2024 11:00) (18 - 20)  SpO2: 99% (15 Nov 2024 11:00) (96% - 100%)                              7.7    12.49 )-----------( 435      ( 15 Nov 2024 04:32 )             23.6       11-15    141  |  110  |  34[H]  ----------------------------<  91  4.5   |  22  |  0.9    Ca    8.7      15 Nov 2024 04:32  Phos  3.8     11-15  Mg     2.0     11-15        MEDICATIONS  (STANDING):  amantadine Syrup 50 milliGRAM(s) Oral <User Schedule>  amLODIPine   Tablet 10 milliGRAM(s) Oral daily  ascorbic acid 500 milliGRAM(s) Oral daily  bacitracin   Ointment 1 Application(s) Topical two times a day  bacitracin   Ointment 1 Application(s) Topical two times a day  chlorhexidine 2% Cloths 1 Application(s) Topical <User Schedule>  dextrose 5%. 1000 milliLiter(s) (100 mL/Hr) IV Continuous <Continuous>  dextrose 5%. 1000 milliLiter(s) (50 mL/Hr) IV Continuous <Continuous>  dextrose 50% Injectable 25 Gram(s) IV Push once  dextrose 50% Injectable 12.5 Gram(s) IV Push once  dextrose 50% Injectable 25 Gram(s) IV Push once  glucagon  Injectable 1 milliGRAM(s) IntraMuscular once  heparin   Injectable 5000 Unit(s) SubCutaneous every 12 hours  hydrALAZINE 25 milliGRAM(s) Oral every 8 hours  insulin lispro (ADMELOG) corrective regimen sliding scale   SubCutaneous every 8 hours  labetalol 200 milliGRAM(s) Oral every 8 hours  lacosamide IVPB 200 milliGRAM(s) IV Intermittent every 12 hours  levETIRAcetam   Injectable 1000 milliGRAM(s) IV Push every 12 hours  psyllium Powder 1 Packet(s) Oral every 8 hours  saccharomyces boulardii 250 milliGRAM(s) Oral two times a day  zinc sulfate 220 milliGRAM(s) Oral daily    MEDICATIONS  (PRN):  acetaminophen     Tablet .. 650 milliGRAM(s) Oral every 6 hours PRN Temp greater or equal to 38C (100.4F)  dextrose Oral Gel 15 Gram(s) Oral once PRN Blood Glucose LESS THAN 70 milliGRAM(s)/deciliter            VEEG in the last 24 hours:    Background------------continues , asymmetrical with better expression of the PDR from the left side reaching 6-7 hz , less than optimally organized and showing reactivity    Focal and generalized slowing-------------1- moderate generalized. 2- moderate to severe right hemispheric focal slowing    Interictal activity------------ abundant right hemispheric mainly posterior TO>PO sharp and spike waves that often presents in periodic pattern     Events---------see below    Seizures------1-  large to frequent  number /stretches of the recording that shows right TO periodic spikes evolving into an ictal pattern disturbing the back ground diffusely  2- runs of icatal/II pattern    Impression: abnormal as above ( discussed with neurology team)    Plan - as/ neurology

## 2024-11-15 NOTE — PROGRESS NOTE ADULT - SUBJECTIVE AND OBJECTIVE BOX
NUTRITION SUPPORT ATTENDING  -  PROGRESS NOTE     Interval Events:  Pt can be wakened but not following commands  EEG in progress  O2NC, no distress  abd soft, obese, ND, GT in place no leak or surrounding erythema  no BM yet today but pt had several loose BM on 11/13 and one large yesterday -- Banatrol TF now ordered  d/w RN at bedside and with PCA    VITALS:  T(F): 97.8 (11-15 @ 08:06), Max: 98.1 (11-15 @ 00:00)  HR: 78 (11-15 @ 08:06) (76 - 78)  BP: 138/59 (11-15 @ 08:06) (129/59 - 142/61)  RR: 18 (11-15 @ 08:06) (18 - 18)  SpO2: 96% (11-15 @ 08:06) (96% - 100%)    HEIGHT/WEIGHT/BMI:   Height (cm): 162.6 (11-12), 162.6 (10-09), 157.5 (05-23), 157.5 (03-21)  Weight (kg): 66.4 (11-12), 64.3 (10-09), 61.2 (05-23), 63 (02-01)  BMI (kg/m2): 25.1 (11-12), 24.3 (10-09), 24.7 (05-23), 25.4 (03-21)    I/Os:   11-14-24 @ 07:01  -  11-15-24 @ 07:00  --------------------------------------------------------  IN:    Enteral Tube Flush: 480 mL    Glucerna: 960 mL    PRBCs (Packed Red Blood Cells): 350 mL  Total IN: 1790 mL  OUT:    Voided (mL): 250 mL  Total OUT: 250 mL  Total NET: 1540 mL    STANDING MEDICATIONS:   amantadine Syrup 50 milliGRAM(s) Oral <User Schedule>  amLODIPine   Tablet 10 milliGRAM(s) Oral daily  ascorbic acid 500 milliGRAM(s) Oral daily  bacitracin   Ointment 1 Application(s) Topical two times a day  bacitracin   Ointment 1 Application(s) Topical two times a day  chlorhexidine 2% Cloths 1 Application(s) Topical <User Schedule>  heparin   Injectable 5000 Unit(s) SubCutaneous every 12 hours  hydrALAZINE 25 milliGRAM(s) Oral every 8 hours  insulin lispro (ADMELOG) corrective regimen sliding scale   SubCutaneous every 8 hours--none given/ needed past 48 h  labetalol 200 milliGRAM(s) Oral every 8 hours  lacosamide IVPB 200 milliGRAM(s) IV Intermittent every 12 hours  lacosamide IVPB 100 milliGRAM(s) IV Intermittent once  levETIRAcetam   Injectable 1000 milliGRAM(s) IV Push every 12 hours  psyllium Powder 1 Packet(s) Oral every 8 hours  saccharomyces boulardii 250 milliGRAM(s) Oral two times a day  zinc sulfate 220 milliGRAM(s) Oral daily    LABS:                         7.7    12.49 )-----------( 435      ( 15 Nov 2024 04:32 )             23.6     141  |  110  |  34[H]  ----------------------------<  91          (11-15-24 @ 04:32)  4.5   |  22  |  0.9    Ca    8.7          (11-15-24 @ 04:32)  Phos  3.8         (11-15-24 @ 04:32)  Mg     2.0         (11-15-24 @ 04:32)    Triglycerides, Serum: 86 mg/dL (11-04 @ 05:16)    Folate: 5.1 ng/mL (10-24 @ 11:14)    Vitamin B12, Serum: 1311 pg/mL (11-08 @ 04:47)    Blood Glucose (Past 24 hours):  127 mg/dL (11-15 @ 07:33)  101 mg/dL (11-15 @ 05:21)  156 mg/dL (11-14 @ 22:47)  168 mg/dL (11-14 @ 21:01)  145 mg/dL (11-14 @ 14:16)    DIET:   Diet, NPO with Tube Feed:   Tube Feeding Modality: Gastrostomy  Glucerna 1.2 Ash (GLUCERNARTH)  Total Volume for 24 Hours (mL): 1440   (mL):  360 over 1 hour  Tube Feed Frequency: Every 6 hours     Bolus Feed Instructions:  check poc glucose and give insulin before each feeding  Free Water Flush Instructions:  flush water 50 ml before and 100 ml after each feeding  Banatrol TF     Qty per Day:  3 (11-15-24 @ 11:20) [Active]

## 2024-11-15 NOTE — PROGRESS NOTE ADULT - ASSESSMENT
79F w/ PMHx of Myelodysplastic syndrome, DM, HTN, CKD, R hemispheric SDH 2.2cm w/ mass effect and 1.1cm MLS , went  to OR for evacuation now s/p MMA embo with worsening extracalvarial bleed and had to go back to the OR. Patient then developed persistent AMS and was found to be in status epilpeticus s/p ketamine drip now weaned off controlled on keppra and vimpat, patient still having multiple seizures recorded on EEG no clinical seizure activity noted.     Recommendations  - continue vEEG  - give stat dose vimpat 100 mg now and continue vimpat 200 mg BID  - continue keppra 1000 mg BID    Case discussed with Dr. Armstrong 79F w/ PMHx of Myelodysplastic syndrome, DM, HTN, CKD, R hemispheric SDH 2.2cm w/ mass effect and 1.1cm MLS , went  to OR for evacuation now s/p MMA embo with worsening extracalvarial bleed and had to go back to the OR. Patient then developed persistent AMS and was found to be in status epilpeticus s/p ketamine drip now weaned off controlled on keppra and vimpat, patient still having multiple seizures recorded on EEG no clinical seizure activity noted.     Recommendations  - continue vEEG  - give stat dose vimpat 100 mg now and continue vimpat 200 mg BID  - continue keppra 1000 mg BID  - give stat dose ativan 2mg and give additional vitan 1 mg at bedtime    Case discussed with Dr. Armstrong

## 2024-11-15 NOTE — PROGRESS NOTE ADULT - ASSESSMENT
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 While in NSICU, patient underwent MMA embolization and post operatively was stable, however, on 10/16 she was noted to have leaking from her crani site. STAT CTH showed increase in SDH and extracalvarial collection, NSG removed 25cc of blood and was re-sutured. Patient was then downgraded to the floor, was started on zosyn for UTI, and was discharged to short term rehab on 10/27. Patient returns to [facility] due to worsening lethargy, admitted to NCCU due to concern for seizures, now downgraded to SDU    Recently diagnosed SDH  Concern for Status epilepticus  Dysphagia s/p PEG on 11/12  - s/p ketamine gtt in NCCU  - MR Brain 11/5 - without acuity; stable SDH  - serial CTH stable   - SBP goal: 110 -  <150  - s/p EEG + for seizure activity. Neurology informed, patient now on VEEG, AEDS adjusted to keppra 1000 mg BID.  - c/w amantidine 50mg q 12 daily and monitor for clinical improvement   - s/p PEG placement 11/12, so far tolerating feeds     UTI with low grade temps - resolved   - UCx + enterococcus. s/p ampicillin  - 11/12 overnight temp of 100.3-> repeat blood cx NGTD. no further fevers   - monitor diarrhea     HTN   - SBP goal: 110 -  <150  - - Echo  10/9/24: Normal global left ventricular systolic function. Left ventricular ejection fraction, by visual estimation, is 65 to 70%  - c/w lisinopril 20mg , labetalol 200mg q 8; c/w norvasc 10mg.      MARCO on CKD improving  - monitor BMP, c/w bladder scans q8H    MDS with Anemia, receives PRN PRBC  - s/p 1u PRBC transfusion 11/7 and 11/14   - monitor CBC< transfuse if Hb <4  - OP fu with Dr Wade     Overall prognosis is guarded  Pending: neurochecks per neurology, monitor fever curve/off abx, fu blood culture, labs,cw  tube feeds, monitor diarrhea    Patient seen at bedside, total time spent to evaluate and treat the patient's acute illness and chronic medical conditions as well as time spent reviewing prior records, labs, radiology, documenting in electronic medical records,  discussing medical plan with   medical team was more than 45 minutes with >50% of time spent face to face with patient, discussing with patient/family as well as coordination of care              79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 While in NSICU, patient underwent MMA embolization and post operatively was stable, however, on 10/16 she was noted to have leaking from her crani site. STAT CTH showed increase in SDH and extracalvarial collection, NSG removed 25cc of blood and was re-sutured. Patient was then downgraded to the floor, was started on zosyn for UTI, and was discharged to short term rehab on 10/27. Patient returns [facility] due to worsening lethargy, admitted to NCCU due to concern for seizures, now downgraded to SDU    Recently diagnosed SDH  Concern for Status epilepticus  Dysphagia s/p PEG on 11/12  Complete immobility due to SDH/Seizures   - s/p ketamine gtt in NCCU  - MR Brain 11/5 - without acuity; stable SDH  - serial CTH stable   - SBP goal: 110 -  <150  - s/p EEG + for seizure activity. Neurology informed, patient now on VEEG, AEDS adjusted to keppra 1000 mg BID.  - c/w amantidine 50mg q 12 daily and monitor for clinical improvement   - s/p PEG placement 11/12, so far tolerating feeds     UTI with low grade temps - resolved   - UCx + enterococcus. s/p ampicillin  - 11/12 overnight temp of 100.3-> repeat blood cx NGTD. no further fevers   - monitor diarrhea     HTN   - SBP goal: 110 -  <150  - - Echo  10/9/24: Normal global left ventricular systolic function. Left ventricular ejection fraction, by visual estimation, is 65 to 70%  - c/w lisinopril 20mg , labetalol 200mg q 8; c/w norvasc 10mg.      MARCO on CKD improving  - monitor BMP, c/w bladder scans q8H    MDS with Anemia, receives PRN PRBC  - s/p 1u PRBC transfusion 11/7 and 11/14   - monitor CBC< transfuse if Hb <4  - OP fu with Dr Wade     Overall prognosis is guarded  Pending: neurochecks per neurology, monitor fever curve/off abx, fu blood culture, labs,cw  tube feeds, monitor diarrhea    Patient seen at bedside, total time spent to evaluate and treat the patient's acute illness and chronic medical conditions as well as time spent reviewing prior records, labs, radiology, documenting in electronic medical records,  discussing medical plan with   medical team was more than 45 minutes with >50% of time spent face to face with patient, discussing with patient/family as well as coordination of care

## 2024-11-15 NOTE — PROGRESS NOTE ADULT - SUBJECTIVE AND OBJECTIVE BOX
SIMONA KUHN  79y Female    CHIEF COMPLAINT:    Patient is a 79y old  Female who presents with a chief complaint of SDH, Seizures (2024 11:07)    INTERVAL HPI/OVERNIGHT EVENTS:    Patient seen and examined. No acute events overnight. on VEEG now, s/p 1u PRBC    ROS: All other systems are negative.    Vital Signs:    T(F): 98 (11-15-24 @ 04:00), Max: 98.3 (24 @ 20:08)  HR: 78 (11-15-24 @ 04:00) (72 - 79)  BP: 129/59 (11-15-24 @ 04:00) (117/55 - 142/61)  RR: 18 (11-15-24 @ 04:00) (18 - 18)  SpO2: 97% (11-15-24 @ 04:00) (97% - 100%)    2024 07:01  -  15 Nov 2024 07:00  --------------------------------------------------------  IN: 1790 mL / OUT: 250 mL / NET: 1540 mL    Daily Weight in k.2 (15 Nov 2024 00:00)    POCT Blood Glucose.: 127 mg/dL (15 Nov 2024 07:33)  POCT Blood Glucose.: 101 mg/dL (15 Nov 2024 05:21)  POCT Blood Glucose.: 156 mg/dL (2024 22:47)  POCT Blood Glucose.: 168 mg/dL (2024 21:01)  POCT Blood Glucose.: 145 mg/dL (2024 14:16)    PHYSICAL EXAM:    GENERAL:  NAD  SKIN: No rashes or lesions  HEENT: Atraumatic. Normocephalic.   NECK: Supple, No JVD. No lymphadenopathy.  PULMONARY: CTA B/L. No wheezing. No rales  CVS: Normal S1, S2. Rate and Rhythm are regular.   ABDOMEN/GI: Soft, Nontender, Nondistended   MSK:  No clubbing or cyanosis  NEUROLOGIC: opens eyes to voice  PSYCH: lethargic, arousable, not oriented     Consultant(s) Notes Reviewed:  [x ] YES  [ ] NO  Care Discussed with Consultants/Other Providers [ x] YES  [ ] NO    LABS:                        7.7    12.49 )-----------( 435      ( 15 Nov 2024 04:32 )             23.6     141  |  110  |  34[H]  ----------------------------<  91  4.5   |  22  |  0.9    Ca    8.7      15 Nov 2024 04:32  Phos  3.8     11-15  Mg     2.0     11-15    Culture - Blood (collected 2024 05:00)  Source: .Blood BLOOD  Preliminary Report (2024 15:01):    No growth at 24 hours    RADIOLOGY & ADDITIONAL TESTS:  Imaging or report Personally Reviewed:  [x] YES  [ ] NO  EKG reviewed: [x] YES  [ ] NO    Medications:  Standing  amantadine Syrup 50 milliGRAM(s) Oral <User Schedule>  amLODIPine   Tablet 10 milliGRAM(s) Oral daily  ascorbic acid 500 milliGRAM(s) Oral daily  bacitracin   Ointment 1 Application(s) Topical two times a day  bacitracin   Ointment 1 Application(s) Topical two times a day  chlorhexidine 2% Cloths 1 Application(s) Topical <User Schedule>  dextrose 5%. 1000 milliLiter(s) IV Continuous <Continuous>  dextrose 5%. 1000 milliLiter(s) IV Continuous <Continuous>  dextrose 50% Injectable 25 Gram(s) IV Push once  dextrose 50% Injectable 12.5 Gram(s) IV Push once  dextrose 50% Injectable 25 Gram(s) IV Push once  glucagon  Injectable 1 milliGRAM(s) IntraMuscular once  heparin   Injectable 5000 Unit(s) SubCutaneous every 12 hours  hydrALAZINE 25 milliGRAM(s) Oral every 8 hours  insulin lispro (ADMELOG) corrective regimen sliding scale   SubCutaneous every 8 hours  labetalol 200 milliGRAM(s) Oral every 8 hours  lacosamide Solution 100 milliGRAM(s) Oral every 12 hours  levETIRAcetam   Injectable 1000 milliGRAM(s) IV Push every 12 hours  psyllium Powder 1 Packet(s) Oral every 8 hours  saccharomyces boulardii 250 milliGRAM(s) Oral two times a day  zinc sulfate 220 milliGRAM(s) Oral daily    PRN Meds  acetaminophen     Tablet .. 650 milliGRAM(s) Oral every 6 hours PRN  dextrose Oral Gel 15 Gram(s) Oral once PRN

## 2024-11-15 NOTE — PROGRESS NOTE ADULT - ASSESSMENT
79 y.o female with h/o MDS, HTN, DM, recent R hemispheric SDH 10/8/2024 s/p crani, evac, and MMA embolization  pt readmitted 10/31 for lethargy, r/o status epilepticus.  + dysphagia - nasogastric tube placed for enteral feeding - now s/p GT placement  + diarrhea   + stage 2 gluteal/ buttock    plan:  - REE calculated by IJE (1431 k/d), and ASPEN recs (4965-7031 k/d)  - continue Glucena 1.2 -        360 ml over 45 min at 7a, 1 pm, 7 pm (volume increased but frequency not adjusted per recs)  - check poc glucose and give any indicated insulin before each feeding (not q6h or q8h)  - once stable, increase mid day feed to 480 ml over an hour --> 1200 ml, 1425 kcal, 71 gm protein/d  - Banatrol TF up to 3 times a day prn watery stool   - considering Abx history, FloraStor added  250 mg twice a day x 10 days

## 2024-11-15 NOTE — CDI QUERY NOTE - NSCDIOTHERTXTBX_GEN_ALL_CORE_HH
Clinical documentation and/or evidence in the medical record indicates that this patient has functional status limitations.  In order to ensure accurate coding and accuracy of the clinical record, the documentation in this patient’s medical record requires additional clarification.      Please include more specific documentation regarding the patient’s functional status in your progress note and/or discharge summary.                                        ===========================    Please clarify if the patient was found to have one of the following:    • Functional Quadriplegia  • Complete Immobility due to frailty   • Complete Immobility due to severe physical disability  • Other (specify)      Supporting documentation and/or clinical evidence:   ** 11/1 Patient Profile Adult:      Functional Assessment: Basic Mobility      Type of Assessment: Admission        Turning from your back to your side while in a flat bed without using bedrails?	                   1 = Total assistance        Moving from lying on your back to sitting on the flat side of a flat bed without using bedrails    1 = Total assistance        Moving to and from a bed to a chair (including a wheelchair)?	                                               1 = Total assistance        Standing up from a chair using your arms (e.g. wheelchair or bedside chair)?	                   1 = Total assistance        Walking in hospital room?                                                                                                     1 = Total assistance        Climbing 3-5 steps with a railing?	                                                                                         1-calculated by average                                                                                                                                                   Score: 6     Functional Assessment: Daily Activity	.      Type of Assessment: Admission        Putting on and taking off regular lower body clothing?	1 = Total assistance        Bathing (including washing, rinsing, drying)?               	1 = Total assistance        Toileting, which includes using toilet, bedpan or urinal?	1 = Total assistance        Putting on and taking off regular upper body clothing?	1 = Total assistance        Take care of personal grooming such as brushing teeth?	1 = Total assistance        Eating meals?	                                                                      1 = Total assistance                                                                                                    Score: 6  	  ** 2. 6.0 Adult Assessment and Intervention [Authored: 15-Nov-2024 08:01]      [facility] Functional Assessment: Basic Mobility        Type of Assessment: Daily assessment          Turning from your back to your side while in a flat bed without using bedrails?:                         1 = Total assistance          Moving from lying on your back to sitting on the flat side of a flat bed without using bedrails?:  1 = Total assistance          Moving to and from a bed to a chair (including a wheelchair)?:                                                1 = Total assistance          Standing up from a chair using your arms (e.g. wheelchair or bedside chair)?:                           1 = Total assistance          Walking in hospital room?:                                                                                                     1 = Total assistance          Climbing 3-5 steps with a railing?:                                                                                          1 = Total assistance                                                                                                                                                        Score: 6     AM-[facility] Functional Assessment: Daily Activity        Putting on and taking off regular lower body clothing?:       1 = Total assistance        Bathing (including washing, rinsing, drying)?:                       1 = Total assistance        Toileting, which includes using toilet, bedpan or urinal?:       1 = Total assistance        Putting on and taking off regular upper body clothing?:       1 = Total assistance        Take care of personal grooming such as brushing teeth?:    1 = Total assistance        Eating meals?:                                                                  1 = Total assistance                                                                                                    Score: 6     ** 11/15 PN Hospitalist:       Assessment:          PMHx of MDS, DM, HTN, CKD recent admission (10/8) for  R hemispheric SDH 2.2cm w/ mass effect and 1.1cm MLS , went  to OR for evacuation and admitted to NSICU for further management. While in NSICU, patient underwent MMA embolization and post operatively was stable, however, on 10/16 she was noted to have leaking from her crani site. STAT CTH showed increase in SDH and extracalvarial collection, NSG removed 25cc of blood and was re-sutured. Patient was then downgraded to the floor, was started on zosyn for UTI, and was discharged to short term rehab on 10/27. Patient returns to [facility] due to worsening lethargy, admitted to NCCU due to concern for seizures

## 2024-11-15 NOTE — PROGRESS NOTE ADULT - ASSESSMENT
79F w/ PMHx of MDS, DM, HTN, CKD, recent admission for SDH s/p crani, evac and MMA embolization, presents with decreased MS, found to have stable SDH, admitted to NSICU for r/o status epilepticus         Plan    #SDH  #R/o Status epilepticus  - s/p ketamine gtt trial to r/o CSD  - Neuro Checks Q4 NC  - CTH stable on 11/1  - MR Brain 11/5 - without acuity; stable SDH  - Keppra 750 gram q 12  - f/u keppra level from 11/8 (with improved CrCl)- so will maintain same dose - 11/4 keppra level was 98 (with Cr 1.5).   - vimpat level 4.02-- increased vimpat to 100 mg q12 on 11/8  - EEg with triphasic waves, mild improvement of background- unchanged - will D/C   - c/w amantidine 50mg q 12 daily and monitor for clinical improvement   - Analgesia/Sedation: Tylenol PRN  - PT/OT  - mobility as tolerates   - ammonia wnl  - rEEG 11/13: There were indicators of  right hemispheric slowing and  epileptic discharges, present as slow spike and PLEDs. Findings consistent with diffuse electrocerebral dysfunction secondary to nonspecific etiology.   - per neuro: vEEG. increase keppra to 1000mg q12h on 11/14      #UTI  - UCx + enterococcus. s/p ampicillin  - 11/12 overnight temp of 100.3-> BCx NGTD    #HTN   - SBP goal: 110 -  <150  - s/p Lasix 40mg IV X1   - c/w lisinopril 20mg , labetalol 200mg q 8; c/w norvasc 10mg  - Echo  TTE Echo Complete w/o Contrast w/ Doppler (10.09.24 @ 12:41) >Normal global left ventricular systolic function. Left ventricular ejection fraction, by visual estimation, is 65 to 70%  - EKG- QTc - NL - 452       #diarrhea- - may be abx-associated (abx completing recently) vs feed related-> resolved  #dysphagia s/p PEG on 11/12  - Darren feeding tube for bolus feeds per nutrition- f/u recs  - Banatrol + metamucil  q 8 started q 8 11/11   - rectal tube 11/3- D/C in am   - lactobacillus --> changed to florastor as per nutrition  - started feeds 11/13 am. advance as tolerated. refeeding labs neg    #MARCO on CKD improving  primafit/bladder scan q 6  - Sodium Goal: 135 - 145  - Replete as needed.  - Mg > 2  - trend Cr    #Pre- diabetic   - POCT, ISS,  -180  - A1c: 6  - TSH: 2.63    ID: s/p UTI  - MRSA nasal neg   s/p ampicillin for enterococcus UTI completed  - Normothermia    # MDS  #Anemia  - anemia to 6.5, transfusing 1 unit on 11/7 with improved Hb to 8.8-->  7.8--> 7.1->6.5 (likely from MDS)  - s/p 1 unit pRBC on 11/14  - h/o Chronic blood transfusions  - SCDs, chemoppx  - LUE venous doppler for swollen/indurated L forearm-  - Superficial thrombophlebitis of the left cephalic vein elevate LUE, topical antiseptic and cold packs  - neurovascular checks of LUE q4  - cpk normal  - folows OP Dr Wade        #MISC  - DVT ppx: heparin  - Activity: IAT  - CODE: FULL  - Diet: regular  - Dispo: DGTF when neuro checks can be decreased per neuro       79F w/ PMHx of MDS, DM, HTN, CKD, recent admission for SDH s/p crani, evac and MMA embolization, presents with decreased MS, found to have stable SDH, admitted to NSICU for r/o status epilepticus         Plan    #SDH  #R/o Status epilepticus  - s/p ketamine gtt trial to r/o CSD  - Neuro Checks Q4 NC  - CTH stable on 11/1  - MR Brain 11/5 - without acuity; stable SDH  - Keppra 750 gram q 12  - f/u keppra level from 11/8 (with improved CrCl)- so will maintain same dose - 11/4 keppra level was 98 (with Cr 1.5).   - vimpat level 4.02-- increased vimpat to 100 mg q12 on 11/8  - EEg with triphasic waves, mild improvement of background- unchanged - will D/C   - c/w amantidine 50mg q 12 daily and monitor for clinical improvement   - Analgesia/Sedation: Tylenol PRN  - PT/OT  - mobility as tolerates   - ammonia wnl  - rEEG 11/13: There were indicators of  right hemispheric slowing and  epileptic discharges, present as slow spike and PLEDs. Findings consistent with diffuse electrocerebral dysfunction secondary to nonspecific etiology.   - per neuro: vEEG. increase keppra to 1000mg q12h on 11/14  - seizues activity on vEEG->switch vimpat po 100mg q12h to iv 200mg q12h per neuro    #UTI  - UCx + enterococcus. s/p ampicillin  - 11/12 overnight temp of 100.3-> BCx NGTD    #HTN   - SBP goal: 110 -  <150  - s/p Lasix 40mg IV X1   - c/w lisinopril 20mg , labetalol 200mg q 8; c/w norvasc 10mg  - Echo  TTE Echo Complete w/o Contrast w/ Doppler (10.09.24 @ 12:41) >Normal global left ventricular systolic function. Left ventricular ejection fraction, by visual estimation, is 65 to 70%  - EKG- QTc - NL - 452       #diarrhea- - may be abx-associated (abx completing recently) vs feed related-> resolved  #dysphagia s/p PEG on 11/12  - Darren feeding tube for bolus feeds per nutrition- f/u recs  - Banatrol + metamucil  q 8 started q 8 11/11   - rectal tube 11/3- D/C in am   - lactobacillus --> changed to florastor as per nutrition  - started feeds 11/13 am. advance as tolerated. refeeding labs neg    #MARCO on CKD improving  primafit/bladder scan q 6  - Sodium Goal: 135 - 145  - Replete as needed.  - Mg > 2  - trend Cr    #Pre- diabetic   - POCT, ISS,  -180  - A1c: 6  - TSH: 2.63    ID: s/p UTI  - MRSA nasal neg   s/p ampicillin for enterococcus UTI completed  - Normothermia    # MDS  #Anemia  - anemia to 6.5, transfusing 1 unit on 11/7 with improved Hb to 8.8-->  7.8--> 7.1->6.5 (likely from MDS)  - s/p 1 unit pRBC on 11/14  - h/o Chronic blood transfusions  - SCDs, chemoppx  - LUE venous doppler for swollen/indurated L forearm-  - Superficial thrombophlebitis of the left cephalic vein elevate LUE, topical antiseptic and cold packs  - neurovascular checks of LUE q4  - cpk normal  - folows OP Dr Wade        #MISC  - DVT ppx: heparin  - Activity: IAT  - CODE: FULL  - Diet: regular  - Dispo: DGTF when neuro checks can be decreased per neuro

## 2024-11-16 LAB
ANION GAP SERPL CALC-SCNC: 10 MMOL/L — SIGNIFICANT CHANGE UP (ref 7–14)
BASOPHILS # BLD AUTO: 0.06 K/UL — SIGNIFICANT CHANGE UP (ref 0–0.2)
BASOPHILS NFR BLD AUTO: 0.5 % — SIGNIFICANT CHANGE UP (ref 0–1)
BUN SERPL-MCNC: 34 MG/DL — HIGH (ref 10–20)
CALCIUM SERPL-MCNC: 8.8 MG/DL — SIGNIFICANT CHANGE UP (ref 8.4–10.4)
CHLORIDE SERPL-SCNC: 108 MMOL/L — SIGNIFICANT CHANGE UP (ref 98–110)
CO2 SERPL-SCNC: 21 MMOL/L — SIGNIFICANT CHANGE UP (ref 17–32)
CREAT SERPL-MCNC: 1 MG/DL — SIGNIFICANT CHANGE UP (ref 0.7–1.5)
EGFR: 58 ML/MIN/1.73M2 — LOW
EOSINOPHIL # BLD AUTO: 0.21 K/UL — SIGNIFICANT CHANGE UP (ref 0–0.7)
EOSINOPHIL NFR BLD AUTO: 1.9 % — SIGNIFICANT CHANGE UP (ref 0–8)
GLUCOSE BLDC GLUCOMTR-MCNC: 129 MG/DL — HIGH (ref 70–99)
GLUCOSE BLDC GLUCOMTR-MCNC: 137 MG/DL — HIGH (ref 70–99)
GLUCOSE BLDC GLUCOMTR-MCNC: 155 MG/DL — HIGH (ref 70–99)
GLUCOSE BLDC GLUCOMTR-MCNC: 160 MG/DL — HIGH (ref 70–99)
GLUCOSE SERPL-MCNC: 132 MG/DL — HIGH (ref 70–99)
HCT VFR BLD CALC: 26 % — LOW (ref 37–47)
HGB BLD-MCNC: 8.6 G/DL — LOW (ref 12–16)
IMM GRANULOCYTES NFR BLD AUTO: 0.5 % — HIGH (ref 0.1–0.3)
LYMPHOCYTES # BLD AUTO: 1.07 K/UL — LOW (ref 1.2–3.4)
LYMPHOCYTES # BLD AUTO: 9.7 % — LOW (ref 20.5–51.1)
MAGNESIUM SERPL-MCNC: 2 MG/DL — SIGNIFICANT CHANGE UP (ref 1.8–2.4)
MCHC RBC-ENTMCNC: 30 PG — SIGNIFICANT CHANGE UP (ref 27–31)
MCHC RBC-ENTMCNC: 33.1 G/DL — SIGNIFICANT CHANGE UP (ref 32–37)
MCV RBC AUTO: 90.6 FL — SIGNIFICANT CHANGE UP (ref 81–99)
MONOCYTES # BLD AUTO: 1.04 K/UL — HIGH (ref 0.1–0.6)
MONOCYTES NFR BLD AUTO: 9.4 % — HIGH (ref 1.7–9.3)
NEUTROPHILS # BLD AUTO: 8.57 K/UL — HIGH (ref 1.4–6.5)
NEUTROPHILS NFR BLD AUTO: 78 % — HIGH (ref 42.2–75.2)
NRBC # BLD: 0 /100 WBCS — SIGNIFICANT CHANGE UP (ref 0–0)
PHOSPHATE SERPL-MCNC: 4.5 MG/DL — SIGNIFICANT CHANGE UP (ref 2.1–4.9)
PLATELET # BLD AUTO: 400 K/UL — SIGNIFICANT CHANGE UP (ref 130–400)
PMV BLD: 10 FL — SIGNIFICANT CHANGE UP (ref 7.4–10.4)
POTASSIUM SERPL-MCNC: 4.8 MMOL/L — SIGNIFICANT CHANGE UP (ref 3.5–5)
POTASSIUM SERPL-SCNC: 4.8 MMOL/L — SIGNIFICANT CHANGE UP (ref 3.5–5)
RBC # BLD: 2.87 M/UL — LOW (ref 4.2–5.4)
RBC # FLD: 14.7 % — HIGH (ref 11.5–14.5)
SODIUM SERPL-SCNC: 139 MMOL/L — SIGNIFICANT CHANGE UP (ref 135–146)
WBC # BLD: 11.01 K/UL — HIGH (ref 4.8–10.8)
WBC # FLD AUTO: 11.01 K/UL — HIGH (ref 4.8–10.8)

## 2024-11-16 PROCEDURE — 99232 SBSQ HOSP IP/OBS MODERATE 35: CPT

## 2024-11-16 PROCEDURE — 99233 SBSQ HOSP IP/OBS HIGH 50: CPT

## 2024-11-16 PROCEDURE — 95720 EEG PHY/QHP EA INCR W/VEEG: CPT

## 2024-11-16 RX ORDER — LEVETIRACETAM 1000 MG/1
1500 TABLET ORAL EVERY 12 HOURS
Refills: 0 | Status: DISCONTINUED | OUTPATIENT
Start: 2024-11-16 | End: 2024-11-20

## 2024-11-16 RX ADMIN — LABETALOL 200 MILLIGRAM(S): 100 TABLET, FILM COATED ORAL at 05:59

## 2024-11-16 RX ADMIN — Medication 250 MILLIGRAM(S): at 05:59

## 2024-11-16 RX ADMIN — Medication 1 APPLICATION(S): at 19:46

## 2024-11-16 RX ADMIN — LEVETIRACETAM 1000 MILLIGRAM(S): 1000 TABLET ORAL at 05:58

## 2024-11-16 RX ADMIN — LACOSAMIDE 140 MILLIGRAM(S): 10 INJECTION INTRAVENOUS at 18:54

## 2024-11-16 RX ADMIN — Medication 1 PACKET(S): at 21:11

## 2024-11-16 RX ADMIN — Medication 250 MILLIGRAM(S): at 19:47

## 2024-11-16 RX ADMIN — Medication 1 APPLICATION(S): at 05:59

## 2024-11-16 RX ADMIN — Medication 50 MILLIGRAM(S): at 05:58

## 2024-11-16 RX ADMIN — AMLODIPINE BESYLATE 10 MILLIGRAM(S): 10 TABLET ORAL at 05:59

## 2024-11-16 RX ADMIN — Medication 50 MILLIGRAM(S): at 13:41

## 2024-11-16 RX ADMIN — CHLORHEXIDINE GLUCONATE 1 APPLICATION(S): 1.2 RINSE ORAL at 08:29

## 2024-11-16 RX ADMIN — Medication 1 PACKET(S): at 05:59

## 2024-11-16 RX ADMIN — Medication 500 MILLIGRAM(S): at 13:40

## 2024-11-16 RX ADMIN — Medication 1 PACKET(S): at 13:42

## 2024-11-16 RX ADMIN — Medication 220 MILLIGRAM(S): at 12:40

## 2024-11-16 RX ADMIN — LEVETIRACETAM 1500 MILLIGRAM(S): 1000 TABLET ORAL at 19:06

## 2024-11-16 RX ADMIN — HYDRALAZINE HYDROCHLORIDE 25 MILLIGRAM(S): 10 TABLET ORAL at 05:59

## 2024-11-16 RX ADMIN — LABETALOL 200 MILLIGRAM(S): 100 TABLET, FILM COATED ORAL at 13:41

## 2024-11-16 RX ADMIN — Medication 1 APPLICATION(S): at 18:53

## 2024-11-16 RX ADMIN — LACOSAMIDE 140 MILLIGRAM(S): 10 INJECTION INTRAVENOUS at 05:58

## 2024-11-16 RX ADMIN — HYDRALAZINE HYDROCHLORIDE 25 MILLIGRAM(S): 10 TABLET ORAL at 13:40

## 2024-11-16 RX ADMIN — Medication 5000 UNIT(S): at 18:53

## 2024-11-16 RX ADMIN — Medication 1 APPLICATION(S): at 06:00

## 2024-11-16 RX ADMIN — LABETALOL 200 MILLIGRAM(S): 100 TABLET, FILM COATED ORAL at 21:11

## 2024-11-16 RX ADMIN — Medication 5000 UNIT(S): at 05:58

## 2024-11-16 RX ADMIN — HYDRALAZINE HYDROCHLORIDE 25 MILLIGRAM(S): 10 TABLET ORAL at 21:11

## 2024-11-16 NOTE — EEG REPORT - NS EEG TEXT BOX
SIMONA KUHN [id number]     Study Date: 11/15/24 08:15-17:39  Duration: 9 hr 19 min  --------------------------------------------------------------------------------------------------  History:  CC/ HPI Patient is a 79y old  Female who presents with a chief complaint of SDH, Seizures (16 Nov 2024 08:53)    MEDICATIONS  (STANDING):  amantadine Syrup 50 milliGRAM(s) Oral <User Schedule>  amLODIPine   Tablet 10 milliGRAM(s) Oral daily  ascorbic acid 500 milliGRAM(s) Oral daily  bacitracin   Ointment 1 Application(s) Topical two times a day  bacitracin   Ointment 1 Application(s) Topical two times a day  chlorhexidine 2% Cloths 1 Application(s) Topical <User Schedule>  dextrose 5%. 1000 milliLiter(s) (50 mL/Hr) IV Continuous <Continuous>  dextrose 5%. 1000 milliLiter(s) (100 mL/Hr) IV Continuous <Continuous>  dextrose 50% Injectable 25 Gram(s) IV Push once  dextrose 50% Injectable 12.5 Gram(s) IV Push once  dextrose 50% Injectable 25 Gram(s) IV Push once  glucagon  Injectable 1 milliGRAM(s) IntraMuscular once  heparin   Injectable 5000 Unit(s) SubCutaneous every 12 hours  hydrALAZINE 25 milliGRAM(s) Oral every 8 hours  insulin lispro (ADMELOG) corrective regimen sliding scale   SubCutaneous every 8 hours  labetalol 200 milliGRAM(s) Oral every 8 hours  lacosamide IVPB 200 milliGRAM(s) IV Intermittent every 12 hours  levETIRAcetam   Injectable 1000 milliGRAM(s) IV Push every 12 hours  psyllium Powder 1 Packet(s) Oral every 8 hours  saccharomyces boulardii 250 milliGRAM(s) Oral two times a day  zinc sulfate 220 milliGRAM(s) Oral daily    --------------------------------------------------------------------------------------------------  Study Interpretation:    [[[Abbreviation Key:  PDR=alpha rhythm/posterior dominant rhythm. A-P=anterior posterior.  Amplitude: ‘very low’:<20; ‘low’:20-49; ‘medium’:; ‘high’:>150uV.  Persistence for periodic/rhythmic patterns (% of epoch) ‘rare’:<1%; ‘occasional’:1-10%; ‘frequent’:10-50%; ‘abundant’:50-90%; ‘continuous’:>90%.  Persistence for sporadic discharges: ‘rare’:<1/hr; ‘occasional’:1/min-1/hr; ‘frequent’:>1/min; ‘abundant’:>1/10 sec.  RPP=rhythmic and periodic patterns; GRDA=generalized rhythmic delta activity; FIRDA=frontal intermittent GRDA; LRDA=lateralized rhythmic delta activity; TIRDA=temporal intermittent rhythmic delta activity;  LPD=PLED=lateralized periodic discharges; GPD=generalized periodic discharges; BIPDs =bilateral independent periodic discharges; Mf=multifocal; SIRPDs=stimulus induced rhythmic, periodic, or ictal appearing discharges; BIRDs=brief potentially ictal rhythmic discharges >4 Hz, lasting .5-10s; PFA (paroxysmal bursts >13 Hz or =8 Hz <10s).  Modifiers: +F=with fast component; +S=with spike component; +R=with rhythmic component.  S-B=burst suppression pattern.  Max=maximal. N1-drowsy; N2-stage II sleep; N3-slow wave sleep. SSS/BETS=small sharp spikes/benign epileptiform transients of sleep. HV=hyperventilation; PS=photic stimulation]]]    Daily EEG Visual Analysis    FINDINGS:      Background:  The background is continuous and asymmetric. The predominant background in the most wakeful state consists of diffuse polymorphic delta and theta slowing. There is no posterior dominant rhythm.   There is right temporal breach effect characterized by intermittently higher amplitudes and intermittent faster and sharper activity.    Background Slowing:  Generalized slowing: As above  Focal slowing: Occasional right temporal focal polymorphic delta slowing    State Changes:   Drowsiness is characterized by fragmentation, attenuation, and slowing of the background activity.  Stage 2 sleep is characterized by symmetric K complexes.    Interictal Findings:  Continuous right posterior temporal (P8 maximal, often with broad field) sharp waves, periodic at 1-2.5 Hz, fluctuating without evolution, most prevalent in wakefulness, decreasing in prevalence in drowsiness and sleep. No clinical correlate seen on video.  Occasional right frontal (F4) sharp waves.  Rare right temporal (T8) sharp waves, rarely in a burst at 2.5 Hz.  17:30: A single 13-second run of left frontotemporal (F7/T7) sharp waves, rhythmic at 2-3 Hz, fluctuating and then increasing in amplitude, with concurrent fluctuating right temporal and right posterior temporal sharp waves. No clinical correlate seen on video.    Electrographic and Electroclinical seizures:  No definite seizures    Other Clinical Events:  None    Activation Procedures:   Hyperventilation is not performed.    Photic stimulation is not performed.    Artifacts:  Intermittent myogenic and movement artifacts are present.    Single-lead EKG: Regular rhythm    EEG Classification / Summary:  Abnormal EEG in the awake, drowsy, and asleep states.   -Continuous right posterior temporal LPDs at 1-2.5 Hz, fluctuating without evolution, most prevalent in wakefulness, decreasing in prevalence in drowsiness and sleep  -A single 13-second run of left frontotemporal rhythmic sharp waves at 2-3 Hz at 17:30  -Occasional right frontal sharp waves  -Rare right temporal sharp waves, rarely in a burst at 2.5 Hz  -Right temporal focal slowing and breach effect  -Moderate diffuse slowing    Clinical Impression:  -Right posterior temporal ictal-interictal continuum in wakefulness, decreasing in drowsiness and sleep  -A single run of left frontotemporal activity on the ictal-interictal continuum, closer to the ictal side, at 17:30  -Additional risk of focal-onset seizures from the right frontal and right temporal regions  -Right temporal focal cerebral dysfunction, structural and/or functional in etiology, with evidence of skull defect in this region.  -Moderate diffuse/multifocal cerebral dysfunction is nonspecific in etiology.           -------------------------------------------------------------------------------------------------------    Yamini Lancaster MD  Attending Physician, [facility] [place]    -------------------------------------------------------------------------------------------------------    To reach EEG technologist:  Please use the pager number for the appropriate hospital or contact the .  At [facility] - Pager #: [phone]    To reach EEG-reading physician:  EEG Reading Room Phone #: [phone]  Epilepsy Answering Service after 5PM and before 8:30AM: Phone #: [phone]

## 2024-11-16 NOTE — PROGRESS NOTE ADULT - ASSESSMENT
79F w/ PMHx of MDS, DM, HTN, CKD, recent admission for SDH s/p crani, evac and MMA embolization, presents with decreased MS, found to have stable SDH, admitted to NSICU for r/o status epilepticus         Plan    #SDH  #R/o Status epilepticus  - s/p ketamine gtt trial to r/o CSD  - Neuro Checks Q4 NC  - CTH stable on 11/1  - MR Brain 11/5 - without acuity; stable SDH  - Keppra 750 gram q 12  - f/u keppra level from 11/8 (with improved CrCl)- so will maintain same dose - 11/4 keppra level was 98 (with Cr 1.5).   - vimpat level 4.02-- increased vimpat to 100 mg q12 on 11/8  - EEg with triphasic waves, mild improvement of background- unchanged - will D/C   - c/w amantidine 50mg q 12 daily and monitor for clinical improvement   - Analgesia/Sedation: Tylenol PRN  - PT/OT  - mobility as tolerates   - ammonia wnl  - rEEG 11/13: There were indicators of  right hemispheric slowing and  epileptic discharges, present as slow spike and PLEDs. Findings consistent with diffuse electrocerebral dysfunction secondary to nonspecific etiology.   - per neuro: vEEG. increase keppra to 1000mg q12h on 11/14  - seizues activity on vEEG->switch vimpat po 100mg q12h to iv 200mg q12h per neuro on 11/15    #UTI  - UCx + enterococcus. s/p ampicillin  - 11/12 overnight temp of 100.3-> BCx NGTD    #HTN   - SBP goal: 110 -  <150  - s/p Lasix 40mg IV X1   - c/w lisinopril 20mg , labetalol 200mg q 8; c/w norvasc 10mg  - Echo  TTE Echo Complete w/o Contrast w/ Doppler (10.09.24 @ 12:41) >Normal global left ventricular systolic function. Left ventricular ejection fraction, by visual estimation, is 65 to 70%  - EKG- QTc - NL - 452       #diarrhea- - may be abx-associated (abx completing recently) vs feed related-> resolved  #dysphagia s/p PEG on 11/12  - Darren feeding tube for bolus feeds per nutrition- f/u recs  - Banatrol + metamucil  q 8 started q 8 11/11   - rectal tube 11/3- D/C in am   - lactobacillus --> changed to florastor as per nutrition  - started feeds 11/13 am. advance as tolerated. refeeding labs neg  - c/w glucerna 360ml q6h      #MARCO on CKD improving  primafit/bladder scan q 6  - Sodium Goal: 135 - 145  - Replete as needed.  - Mg > 2  - trend Cr    #Pre- diabetic   - POCT, ISS,  -180  - A1c: 6  - TSH: 2.63    ID: s/p UTI  - MRSA nasal neg   s/p ampicillin for enterococcus UTI completed  - Normothermia    # MDS  #Anemia  - anemia to 6.5, transfusing 1 unit on 11/7 with improved Hb to 8.8-->  7.8--> 7.1->6.5 (likely from MDS)  - s/p 1 unit pRBC on 11/14  - h/o Chronic blood transfusions  - SCDs, chemoppx  - LUE venous doppler for swollen/indurated L forearm-  - Superficial thrombophlebitis of the left cephalic vein elevate LUE, topical antiseptic and cold packs  - neurovascular checks of LUE q4  - cpk normal  - folows OP Dr Wade        #MISC  - DVT ppx: heparin  - Activity: IAT  - CODE: FULL  - Diet: regular  - Dispo: CEU

## 2024-11-16 NOTE — PROGRESS NOTE ADULT - SUBJECTIVE AND OBJECTIVE BOX
SIMONA KUHN  79y Female    CHIEF COMPLAINT:    Patient is a 79y old  Female who presents with a chief complaint of SDH, Seizures (2024 08:34)    INTERVAL HPI/OVERNIGHT EVENTS:    Patient seen and examined. No acute events overnight. Overall unchanged, remains on VEEG    ROS: All other systems are negative.    Vital Signs:    T(F): 97.7 (24 @ 07:32), Max: 97.8 (11-15-24 @ 17:08)  HR: 75 (24 @ 07:32) (74 - 200)  BP: 146/64 (24 @ 07:32) (117/53 - 155/79)  RR: 18 (24 @ 07:32) (17 - 20)  SpO2: 100% (24 @ 07:32) (98% - 100%)    15 Nov 2024 07:01  -  2024 07:00  --------------------------------------------------------  IN: 0 mL / OUT: 600 mL / NET: -600 mL    Daily Weight in k.8 (2024 04:31)    POCT Blood Glucose.: 155 mg/dL (2024 07:33)  POCT Blood Glucose.: 149 mg/dL (15 Nov 2024 21:04)  POCT Blood Glucose.: 128 mg/dL (15 Nov 2024 17:33)  POCT Blood Glucose.: 128 mg/dL (15 Nov 2024 11:59)    PHYSICAL EXAM:    GENERAL:  NAD  SKIN: No rashes or lesions  HEENT: Atraumatic. Normocephalic.  NECK: Supple, No JVD.   PULMONARY: CTA B/L. No wheezing. No rales  CVS: Normal S1, S2. Rate and Rhythm are regular.  ABDOMEN/GI: Soft, Nontender, Nondistended  MSK:  No clubbing or cyanosis   NEUROLOGIC: Arousable to voice, does not follow commands   PSYCH: Lethargic     Consultant(s) Notes Reviewed:  [x ] YES  [ ] NO  Care Discussed with Consultants/Other Providers [ x] YES  [ ] NO    LABS:                        7.7    12.49 )-----------( 435      ( 15 Nov 2024 04:32 )             23.6     138  |  107  |  34[H]  ----------------------------<  130[H]  4.6   |  22  |  1.0    Ca    8.6      15 Nov 2024 21:31  Phos  3.8     11-15  Mg     1.9     11-15    RADIOLOGY & ADDITIONAL TESTS:  Imaging or report Personally Reviewed:  [x] YES  [ ] NO  EKG reviewed: [x] YES  [ ] NO    Medications:  Standing  amantadine Syrup 50 milliGRAM(s) Oral <User Schedule>  amLODIPine   Tablet 10 milliGRAM(s) Oral daily  ascorbic acid 500 milliGRAM(s) Oral daily  bacitracin   Ointment 1 Application(s) Topical two times a day  bacitracin   Ointment 1 Application(s) Topical two times a day  chlorhexidine 2% Cloths 1 Application(s) Topical <User Schedule>  dextrose 5%. 1000 milliLiter(s) IV Continuous <Continuous>  dextrose 5%. 1000 milliLiter(s) IV Continuous <Continuous>  dextrose 50% Injectable 25 Gram(s) IV Push once  dextrose 50% Injectable 12.5 Gram(s) IV Push once  dextrose 50% Injectable 25 Gram(s) IV Push once  glucagon  Injectable 1 milliGRAM(s) IntraMuscular once  heparin   Injectable 5000 Unit(s) SubCutaneous every 12 hours  hydrALAZINE 25 milliGRAM(s) Oral every 8 hours  insulin lispro (ADMELOG) corrective regimen sliding scale   SubCutaneous every 8 hours  labetalol 200 milliGRAM(s) Oral every 8 hours  lacosamide IVPB 200 milliGRAM(s) IV Intermittent every 12 hours  levETIRAcetam   Injectable 1000 milliGRAM(s) IV Push every 12 hours  psyllium Powder 1 Packet(s) Oral every 8 hours  saccharomyces boulardii 250 milliGRAM(s) Oral two times a day  zinc sulfate 220 milliGRAM(s) Oral daily    PRN Meds  acetaminophen     Tablet .. 650 milliGRAM(s) Oral every 6 hours PRN  dextrose Oral Gel 15 Gram(s) Oral once PRN

## 2024-11-16 NOTE — PROGRESS NOTE ADULT - SUBJECTIVE AND OBJECTIVE BOX
SUBJECTIVE/OVERNIGHT EVENTS  Today is hospital day 16d. This morning patient was seen and examined at bedside, resting comfortably in bed. No acute or major events overnight.    HOSPITAL COURSE      CODE STATUS:    FAMILY COMMUNICATION  Contact date:  Name of person contacted:  Relationship to patient:  Communication details:    MEDICATIONS  STANDING MEDICATIONS  amantadine Syrup 50 milliGRAM(s) Oral <User Schedule>  amLODIPine   Tablet 10 milliGRAM(s) Oral daily  ascorbic acid 500 milliGRAM(s) Oral daily  bacitracin   Ointment 1 Application(s) Topical two times a day  bacitracin   Ointment 1 Application(s) Topical two times a day  chlorhexidine 2% Cloths 1 Application(s) Topical <User Schedule>  dextrose 5%. 1000 milliLiter(s) IV Continuous <Continuous>  dextrose 5%. 1000 milliLiter(s) IV Continuous <Continuous>  dextrose 50% Injectable 25 Gram(s) IV Push once  dextrose 50% Injectable 12.5 Gram(s) IV Push once  dextrose 50% Injectable 25 Gram(s) IV Push once  glucagon  Injectable 1 milliGRAM(s) IntraMuscular once  heparin   Injectable 5000 Unit(s) SubCutaneous every 12 hours  hydrALAZINE 25 milliGRAM(s) Oral every 8 hours  insulin lispro (ADMELOG) corrective regimen sliding scale   SubCutaneous every 8 hours  labetalol 200 milliGRAM(s) Oral every 8 hours  lacosamide IVPB 200 milliGRAM(s) IV Intermittent every 12 hours  levETIRAcetam   Injectable 1000 milliGRAM(s) IV Push every 12 hours  psyllium Powder 1 Packet(s) Oral every 8 hours  saccharomyces boulardii 250 milliGRAM(s) Oral two times a day  zinc sulfate 220 milliGRAM(s) Oral daily    PRN MEDICATIONS  acetaminophen     Tablet .. 650 milliGRAM(s) Oral every 6 hours PRN  dextrose Oral Gel 15 Gram(s) Oral once PRN    VITALS  T(F): 97.1 (11-16-24 @ 04:31), Max: 97.8 (11-15-24 @ 08:06)  HR: 74 (11-16-24 @ 04:31) (74 - 200)  BP: 120/53 (11-16-24 @ 04:31) (115/52 - 155/79)  RR: 18 (11-16-24 @ 04:31) (17 - 20)  SpO2: 100% (11-16-24 @ 04:31) (96% - 100%)  POCT Blood Glucose.: 155 mg/dL (11-16-24 @ 07:33)  POCT Blood Glucose.: 149 mg/dL (11-15-24 @ 21:04)  POCT Blood Glucose.: 128 mg/dL (11-15-24 @ 17:33)  POCT Blood Glucose.: 128 mg/dL (11-15-24 @ 11:59)    PHYSICAL EXAM  GENERAL  (  x) NAD, lying in bed comfortably     (  ) obtunded     (  ) lethargic     (  ) somnolent    HEAD  (  x) Atraumatic     (  ) hematoma     (  ) laceration (specify location:       )     NECK  (  ) Supple     (  ) neck stiffness     (  ) nuchal rigidity     (  )  no JVD     (  ) JVD present ( -- cm)    HEART  Rate -->  (  ) normal rate    (  ) bradycardic    ( x ) tachycardic  Rhythm -->  ( x ) regular    (  ) regularly irregular    (  ) irregularly irregular  Murmurs -->  (  ) normal s1/s2    (  ) systolic murmur    (  ) diastolic murmur    (  ) continuous murmur     (  ) S3 present    (  ) S4 present    LUNGS  ( x )Unlabored respirations     (  ) tachypnea  (  ) B/L air entry     (  ) decreased breath sounds in:  (location     )    (  ) no adventitious sound     (  ) crackles     (  ) wheezing      (  ) rhonchi      (specify location:       )  (  ) chest wall tenderness (specify location:       )    ABDOMEN  (x  ) Soft     (  ) tense   |   (  ) nondistended     (  ) distended   |   (  ) +BS     (  ) hypoactive bowel sounds     (  ) hyperactive bowel sounds  (  ) nontender     (  ) RUQ tenderness     (  ) RLQ tenderness     (  ) LLQ tenderness     (  ) epigastric tenderness     (  ) diffuse tenderness  (  ) Splenomegaly      (  ) Hepatomegaly      (  ) Jaundice     (  ) ecchymosis     EXTREMITIES  (  x) Normal     (  ) Rash     (  ) ecchymosis     (  ) varicose veins      (  ) pitting edema     (  ) non-pitting edema   (  ) ulceration     (  ) gangrene:     (location:     )    NERVOUS SYSTEM  (  ) A&Ox3     (  ) confused     (x ) lethargic  CN II-XII:     (  ) Intact     (  ) focal deficits  (Specify:     )   Upper extremities:     (  ) strength X/5     (  ) focal deficit (specify:    )  Lower extremities:     (  ) strength  X/5    (  ) focal deficit (specify:    )    SKIN  (  ) No rashes or lesions     (  ) maculopapular rash     (  ) pustules     (  ) vesicles     (  ) ulcer     (  ) ecchymosis     (specify location:     )    (  ) Indwelling Small Catheter   Date insterted:    Reason (  ) Critical illness     (  ) urinary retention    (  ) Accurate Ins/Outs Monitoring     (  ) CMO patient    (  ) Central Line  Date inserted:  Location: (  ) Right IJ   (  ) Left IJ   (  ) Right Fem   (  ) Left Fem    (  ) SPC  (  ) pigtail  (  ) PEG tube  (  ) colostomy  (  ) jejunostomy  (  ) U-Dall    LABS             7.7    12.49 )-----------( 435      ( 11-15-24 @ 04:32 )             23.6     138  |  107  |  34  -------------------------<  130   11-15-24 @ 21:31  4.6  |  22  |  1.0    Ca      8.6     11-15-24 @ 21:31  Phos   3.8     11-15-24 @ 04:32  Mg     1.9     11-15-24 @ 21:31          Urinalysis Basic - ( 15 Nov 2024 21:31 )    Color: x / Appearance: x / SG: x / pH: x  Gluc: 130 mg/dL / Ketone: x  / Bili: x / Urobili: x   Blood: x / Protein: x / Nitrite: x   Leuk Esterase: x / RBC: x / WBC x   Sq Epi: x / Non Sq Epi: x / Bacteria: x          IMAGING

## 2024-11-16 NOTE — PROGRESS NOTE ADULT - SUBJECTIVE AND OBJECTIVE BOX
Neurology Progress Note    Interval History:    Patient seen and examined today. Overnight rapid response called for HR in 200s in monitor, upon obtaining 12-lead EKG and changing the leads of EKG actual HR was in 70s-80s withno symptoms. Yesterday, EEG reported pt still having electrographic seizures, so Vimpat 100mg was given at 12PM and dose increased to 200mg BID.      PAST MEDICAL & SURGICAL HISTORY:  DM (diabetes mellitus)    MDS (myelodysplastic syndrome)    H/O colonoscopy        Medications:  acetaminophen     Tablet .. 650 milliGRAM(s) Oral every 6 hours PRN  amantadine Syrup 50 milliGRAM(s) Oral <User Schedule>  amLODIPine   Tablet 10 milliGRAM(s) Oral daily  ascorbic acid 500 milliGRAM(s) Oral daily  bacitracin   Ointment 1 Application(s) Topical two times a day  bacitracin   Ointment 1 Application(s) Topical two times a day  chlorhexidine 2% Cloths 1 Application(s) Topical <User Schedule>  dextrose 5%. 1000 milliLiter(s) IV Continuous <Continuous>  dextrose 5%. 1000 milliLiter(s) IV Continuous <Continuous>  dextrose 50% Injectable 25 Gram(s) IV Push once  dextrose 50% Injectable 12.5 Gram(s) IV Push once  dextrose 50% Injectable 25 Gram(s) IV Push once  dextrose Oral Gel 15 Gram(s) Oral once PRN  glucagon  Injectable 1 milliGRAM(s) IntraMuscular once  heparin   Injectable 5000 Unit(s) SubCutaneous every 12 hours  hydrALAZINE 25 milliGRAM(s) Oral every 8 hours  insulin lispro (ADMELOG) corrective regimen sliding scale   SubCutaneous every 8 hours  labetalol 200 milliGRAM(s) Oral every 8 hours  lacosamide IVPB 200 milliGRAM(s) IV Intermittent every 12 hours  levETIRAcetam   Injectable 1000 milliGRAM(s) IV Push every 12 hours  psyllium Powder 1 Packet(s) Oral every 8 hours  saccharomyces boulardii 250 milliGRAM(s) Oral two times a day  zinc sulfate 220 milliGRAM(s) Oral daily      Vital Signs Last 24 Hrs  T(C): 36.5 (16 Nov 2024 07:32), Max: 36.6 (15 Nov 2024 17:08)  T(F): 97.7 (16 Nov 2024 07:32), Max: 97.8 (15 Nov 2024 17:08)  HR: 75 (16 Nov 2024 07:32) (74 - 200)  BP: 146/64 (16 Nov 2024 07:32) (117/53 - 155/79)  BP(mean): 92 (16 Nov 2024 07:32) (84 - 110)  RR: 18 (16 Nov 2024 07:32) (17 - 20)  SpO2: 100% (16 Nov 2024 07:32) (98% - 100%)    Parameters below as of 16 Nov 2024 07:32  Patient On (Oxygen Delivery Method): nasal cannula        Neurological Exam:   Mental status: Awake, alert and oriented x3.  Recent and remote memory intact.  Naming, repetition and comprehension intact.  Attention/concentration intact.  No dysarthria, no aphasia.  Fund of knowledge appropriate.    Cranial nerves: Pupils equally round and reactive to light, visual fields full, no nystagmus, extraocular muscles intact, V1 through V3 intact bilaterally and symmetric, face symmetric, hearing intact to finger rub, palate elevation symmetric, tongue was midline.  Motor:  MRC grading 5/5 b/l UE/LE.   strength 5/5.  Normal tone and bulk.  No abnormal movements.    Sensation: Intact to light touch, proprioception, and pinprick.   Coordination: No dysmetria on finger-to-nose and heel-to-shin.  No dysdiadokinesia.  Reflexes: 2+ in bilateral UE/LE, downgoing toes bilaterally. (-) Gong.  Gait: Narrow and steady. No ataxia.  Romberg negative    Labs:  CBC Full  -  ( 15 Nov 2024 04:32 )  WBC Count : 12.49 K/uL  RBC Count : 2.57 M/uL  Hemoglobin : 7.7 g/dL  Hematocrit : 23.6 %  Platelet Count - Automated : 435 K/uL  Mean Cell Volume : 91.8 fL  Mean Cell Hemoglobin : 30.0 pg  Mean Cell Hemoglobin Concentration : 32.6 g/dL  Auto Neutrophil # : 10.05 K/uL  Auto Lymphocyte # : 1.04 K/uL  Auto Monocyte # : 1.03 K/uL  Auto Eosinophil # : 0.23 K/uL  Auto Basophil # : 0.07 K/uL  Auto Neutrophil % : 80.5 %  Auto Lymphocyte % : 8.3 %  Auto Monocyte % : 8.2 %  Auto Eosinophil % : 1.8 %  Auto Basophil % : 0.6 %    11-15    138  |  107  |  34[H]  ----------------------------<  130[H]  4.6   |  22  |  1.0    Ca    8.6      15 Nov 2024 21:31  Phos  3.8     11-15  Mg     1.9     11-15          Urinalysis Basic - ( 15 Nov 2024 21:31 )    Color: x / Appearance: x / SG: x / pH: x  Gluc: 130 mg/dL / Ketone: x  / Bili: x / Urobili: x   Blood: x / Protein: x / Nitrite: x   Leuk Esterase: x / RBC: x / WBC x   Sq Epi: x / Non Sq Epi: x / Bacteria: x        Radiology: Reviewed Neurology Progress Note    Interval History:    Patient seen and examined today. Overnight rapid response called for HR in 200s in monitor, upon obtaining 12-lead EKG and changing the leads of EKG actual HR was in 70s-80s withno symptoms. Yesterday, EEG reported pt still having electrographic seizures, so Vimpat 100mg was given at 12PM and dose increased to 200mg BID.      PAST MEDICAL & SURGICAL HISTORY:  DM (diabetes mellitus)    MDS (myelodysplastic syndrome)    H/O colonoscopy        Medications:  acetaminophen     Tablet .. 650 milliGRAM(s) Oral every 6 hours PRN  amantadine Syrup 50 milliGRAM(s) Oral <User Schedule>  amLODIPine   Tablet 10 milliGRAM(s) Oral daily  ascorbic acid 500 milliGRAM(s) Oral daily  bacitracin   Ointment 1 Application(s) Topical two times a day  bacitracin   Ointment 1 Application(s) Topical two times a day  chlorhexidine 2% Cloths 1 Application(s) Topical <User Schedule>  dextrose 5%. 1000 milliLiter(s) IV Continuous <Continuous>  dextrose 5%. 1000 milliLiter(s) IV Continuous <Continuous>  dextrose 50% Injectable 25 Gram(s) IV Push once  dextrose 50% Injectable 12.5 Gram(s) IV Push once  dextrose 50% Injectable 25 Gram(s) IV Push once  dextrose Oral Gel 15 Gram(s) Oral once PRN  glucagon  Injectable 1 milliGRAM(s) IntraMuscular once  heparin   Injectable 5000 Unit(s) SubCutaneous every 12 hours  hydrALAZINE 25 milliGRAM(s) Oral every 8 hours  insulin lispro (ADMELOG) corrective regimen sliding scale   SubCutaneous every 8 hours  labetalol 200 milliGRAM(s) Oral every 8 hours  lacosamide IVPB 200 milliGRAM(s) IV Intermittent every 12 hours  levETIRAcetam   Injectable 1000 milliGRAM(s) IV Push every 12 hours  psyllium Powder 1 Packet(s) Oral every 8 hours  saccharomyces boulardii 250 milliGRAM(s) Oral two times a day  zinc sulfate 220 milliGRAM(s) Oral daily      Vital Signs Last 24 Hrs  T(C): 36.5 (16 Nov 2024 07:32), Max: 36.6 (15 Nov 2024 17:08)  T(F): 97.7 (16 Nov 2024 07:32), Max: 97.8 (15 Nov 2024 17:08)  HR: 75 (16 Nov 2024 07:32) (74 - 200)  BP: 146/64 (16 Nov 2024 07:32) (117/53 - 155/79)  BP(mean): 92 (16 Nov 2024 07:32) (84 - 110)  RR: 18 (16 Nov 2024 07:32) (17 - 20)  SpO2: 100% (16 Nov 2024 07:32) (98% - 100%)    Parameters below as of 16 Nov 2024 07:32  Patient On (Oxygen Delivery Method): nasal cannula        Neurological Exam:   - General: lying comfortably in the bed   -Mental status: moans to noxious stimuli but does not open eyes  -Cranial nerves:  II: Blink-to-threat: not present  II, III: Pupillary responses: equal and reactive to light  III, IV, VI: primary gaze midline  VII: Face is symmetric  V,VII: positive Corneal reflex, + grimace response  Sensory: patient will moan to noxious stimuli in all extremities and withdraws b/l UE to pain  Motor: Spontaneous movement in UE (R>L), no myoclonus  Reflexes: 2/4 DTR, mute Plantar response, no posturing reflexes, no clonus  Gait: deferred    Labs:  CBC Full  -  ( 15 Nov 2024 04:32 )  WBC Count : 12.49 K/uL  RBC Count : 2.57 M/uL  Hemoglobin : 7.7 g/dL  Hematocrit : 23.6 %  Platelet Count - Automated : 435 K/uL  Mean Cell Volume : 91.8 fL  Mean Cell Hemoglobin : 30.0 pg  Mean Cell Hemoglobin Concentration : 32.6 g/dL  Auto Neutrophil # : 10.05 K/uL  Auto Lymphocyte # : 1.04 K/uL  Auto Monocyte # : 1.03 K/uL  Auto Eosinophil # : 0.23 K/uL  Auto Basophil # : 0.07 K/uL  Auto Neutrophil % : 80.5 %  Auto Lymphocyte % : 8.3 %  Auto Monocyte % : 8.2 %  Auto Eosinophil % : 1.8 %  Auto Basophil % : 0.6 %    11-15    138  |  107  |  34[H]  ----------------------------<  130[H]  4.6   |  22  |  1.0    Ca    8.6      15 Nov 2024 21:31  Phos  3.8     11-15  Mg     1.9     11-15          Urinalysis Basic - ( 15 Nov 2024 21:31 )    Color: x / Appearance: x / SG: x / pH: x  Gluc: 130 mg/dL / Ketone: x  / Bili: x / Urobili: x   Blood: x / Protein: x / Nitrite: x   Leuk Esterase: x / RBC: x / WBC x   Sq Epi: x / Non Sq Epi: x / Bacteria: x        Radiology: Reviewed

## 2024-11-16 NOTE — PROGRESS NOTE ADULT - ASSESSMENT
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 While in NSICU, patient underwent MMA embolization and post operatively was stable, however, on 10/16 she was noted to have leaking from her crani site. STAT CTH showed increase in SDH and extracalvarial collection, NSG removed 25cc of blood and was re-sutured. Patient was then downgraded to the floor, was started on zosyn for UTI, and was discharged to short term rehab on 10/27. Patient returns to [facility] due to worsening lethargy, admitted to NCCU due to concern for seizures, now downgraded to SDU    Recently diagnosed SDH  Concern for Status epilepticus  Dysphagia s/p PEG on 11/12  Complete immobility due to SDH/Seizures   - s/p ketamine gtt in NCCU  - MR Brain 11/5 - without acuity; stable SDH  - serial CTH stable   - SBP goal: 110 -  <150  - s/p EEG + for seizure activity. Neurology informed, patient now on VEEG, AEDS adjusted to keppra 1000 mg BID and Lacosamide 200 q12.  - c/w amantidine 50mg q 12 daily and monitor for clinical improvement   - s/p PEG placement 11/12, so far tolerating feeds     UTI with low grade temps - resolved   - UCx + enterococcus. s/p ampicillin  - 11/12 overnight temp of 100.3-> repeat blood cx NGTD. no further fevers   - monitor diarrhea     HTN   - SBP goal: 110 -  <150  - - Echo  10/9/24: Normal global left ventricular systolic function. Left ventricular ejection fraction, by visual estimation, is 65 to 70%  - c/w lisinopril 20mg , labetalol 200mg q 8; c/w norvasc 10mg.      MARCO on CKD improving  - monitor BMP, c/w bladder scans q8H    MDS with Anemia, receives PRN PRBC  - s/p 1u PRBC transfusion 11/7 and 11/14   - monitor CBC< transfuse if Hb <4  - OP fu with Dr Wade     Overall prognosis is guarded  Pending: neurochecks per neurology, monitor fever curve/off abx, labs,cw  tube feeds, monitor diarrhea    Patient seen at bedside, total time spent to evaluate and treat the patient's acute illness and chronic medical conditions as well as time spent reviewing prior records, labs, radiology, documenting in electronic medical records,  discussing medical plan with   medical team was more than 45 minutes with >50% of time spent face to face with patient, discussing with patient/family as well as coordination of care

## 2024-11-16 NOTE — PROGRESS NOTE ADULT - ASSESSMENT
79F w/ PMHx of Myelodysplastic syndrome, DM, HTN, CKD, R hemispheric SDH 2.2cm w/ mass effect and 1.1cm MLS , went  to OR for evacuation now s/p MMA embo with worsening extracalvarial bleed and had to go back to the OR. Patient then developed persistent AMS and was admitted to NICU to r/o status epilpeticus s/p ketamine drip now weaned off controlled on keppra and vimpat. VEEG was neg in NICU, however since downgrade to SDU, pt having having seizures on EEG no clinical seizure activity noted. Increased vimpat with seizure resolution but still abundant interictal activity.    Recommendations  - continue vEEG  - continue vimpat 200 mg BID  - increase keppra to 1500 mg BID  - Ativan 2mg IV for GTCs > 2 min only  - Neurological assessment Q4hrs  - Seizure precautions  - Maintain Mg> 2 mmol/l    Case discussed with Dr. Hatfield

## 2024-11-17 LAB
ANION GAP SERPL CALC-SCNC: 13 MMOL/L — SIGNIFICANT CHANGE UP (ref 7–14)
BASOPHILS # BLD AUTO: 0.1 K/UL — SIGNIFICANT CHANGE UP (ref 0–0.2)
BASOPHILS NFR BLD AUTO: 0.9 % — SIGNIFICANT CHANGE UP (ref 0–1)
BUN SERPL-MCNC: 34 MG/DL — HIGH (ref 10–20)
CALCIUM SERPL-MCNC: 8.9 MG/DL — SIGNIFICANT CHANGE UP (ref 8.4–10.5)
CHLORIDE SERPL-SCNC: 109 MMOL/L — SIGNIFICANT CHANGE UP (ref 98–110)
CO2 SERPL-SCNC: 18 MMOL/L — SIGNIFICANT CHANGE UP (ref 17–32)
CREAT SERPL-MCNC: 1 MG/DL — SIGNIFICANT CHANGE UP (ref 0.7–1.5)
EGFR: 58 ML/MIN/1.73M2 — LOW
EOSINOPHIL # BLD AUTO: 0.28 K/UL — SIGNIFICANT CHANGE UP (ref 0–0.7)
EOSINOPHIL NFR BLD AUTO: 2.6 % — SIGNIFICANT CHANGE UP (ref 0–8)
GLUCOSE BLDC GLUCOMTR-MCNC: 132 MG/DL — HIGH (ref 70–99)
GLUCOSE BLDC GLUCOMTR-MCNC: 150 MG/DL — HIGH (ref 70–99)
GLUCOSE BLDC GLUCOMTR-MCNC: 153 MG/DL — HIGH (ref 70–99)
GLUCOSE BLDC GLUCOMTR-MCNC: 169 MG/DL — HIGH (ref 70–99)
GLUCOSE BLDC GLUCOMTR-MCNC: 201 MG/DL — HIGH (ref 70–99)
GLUCOSE SERPL-MCNC: 123 MG/DL — HIGH (ref 70–99)
HCT VFR BLD CALC: 27.3 % — LOW (ref 37–47)
HGB BLD-MCNC: 8.6 G/DL — LOW (ref 12–16)
IMM GRANULOCYTES NFR BLD AUTO: 0.6 % — HIGH (ref 0.1–0.3)
LYMPHOCYTES # BLD AUTO: 1.17 K/UL — LOW (ref 1.2–3.4)
LYMPHOCYTES # BLD AUTO: 10.9 % — LOW (ref 20.5–51.1)
MAGNESIUM SERPL-MCNC: 2 MG/DL — SIGNIFICANT CHANGE UP (ref 1.8–2.4)
MCHC RBC-ENTMCNC: 30 PG — SIGNIFICANT CHANGE UP (ref 27–31)
MCHC RBC-ENTMCNC: 31.5 G/DL — LOW (ref 32–37)
MCV RBC AUTO: 95.1 FL — SIGNIFICANT CHANGE UP (ref 81–99)
MONOCYTES # BLD AUTO: 1.07 K/UL — HIGH (ref 0.1–0.6)
MONOCYTES NFR BLD AUTO: 10 % — HIGH (ref 1.7–9.3)
NEUTROPHILS # BLD AUTO: 8.01 K/UL — HIGH (ref 1.4–6.5)
NEUTROPHILS NFR BLD AUTO: 75 % — SIGNIFICANT CHANGE UP (ref 42.2–75.2)
NRBC # BLD: 0 /100 WBCS — SIGNIFICANT CHANGE UP (ref 0–0)
PHOSPHATE SERPL-MCNC: 4.4 MG/DL — SIGNIFICANT CHANGE UP (ref 2.1–4.9)
PLATELET # BLD AUTO: 373 K/UL — SIGNIFICANT CHANGE UP (ref 130–400)
PMV BLD: 10.1 FL — SIGNIFICANT CHANGE UP (ref 7.4–10.4)
POTASSIUM SERPL-MCNC: 5.1 MMOL/L — HIGH (ref 3.5–5)
POTASSIUM SERPL-SCNC: 5.1 MMOL/L — HIGH (ref 3.5–5)
RBC # BLD: 2.87 M/UL — LOW (ref 4.2–5.4)
RBC # FLD: 14.8 % — HIGH (ref 11.5–14.5)
SODIUM SERPL-SCNC: 140 MMOL/L — SIGNIFICANT CHANGE UP (ref 135–146)
WBC # BLD: 10.69 K/UL — SIGNIFICANT CHANGE UP (ref 4.8–10.8)
WBC # FLD AUTO: 10.69 K/UL — SIGNIFICANT CHANGE UP (ref 4.8–10.8)

## 2024-11-17 PROCEDURE — 95720 EEG PHY/QHP EA INCR W/VEEG: CPT

## 2024-11-17 PROCEDURE — 99233 SBSQ HOSP IP/OBS HIGH 50: CPT

## 2024-11-17 PROCEDURE — 71045 X-RAY EXAM CHEST 1 VIEW: CPT | Mod: 26

## 2024-11-17 PROCEDURE — 99232 SBSQ HOSP IP/OBS MODERATE 35: CPT

## 2024-11-17 RX ORDER — IPRATROPIUM BROMIDE AND ALBUTEROL SULFATE 2.5; .5 MG/3ML; MG/3ML
3 SOLUTION RESPIRATORY (INHALATION) EVERY 8 HOURS
Refills: 0 | Status: COMPLETED | OUTPATIENT
Start: 2024-11-17 | End: 2024-11-17

## 2024-11-17 RX ORDER — IPRATROPIUM BROMIDE AND ALBUTEROL SULFATE 2.5; .5 MG/3ML; MG/3ML
3 SOLUTION RESPIRATORY (INHALATION) EVERY 6 HOURS
Refills: 0 | Status: DISCONTINUED | OUTPATIENT
Start: 2024-11-17 | End: 2024-11-17

## 2024-11-17 RX ADMIN — Medication 1 PACKET(S): at 14:42

## 2024-11-17 RX ADMIN — Medication 5000 UNIT(S): at 05:14

## 2024-11-17 RX ADMIN — Medication 250 MILLIGRAM(S): at 18:45

## 2024-11-17 RX ADMIN — Medication 1 APPLICATION(S): at 05:15

## 2024-11-17 RX ADMIN — Medication 4: at 21:20

## 2024-11-17 RX ADMIN — Medication 50 MILLIGRAM(S): at 05:15

## 2024-11-17 RX ADMIN — HYDRALAZINE HYDROCHLORIDE 25 MILLIGRAM(S): 10 TABLET ORAL at 05:15

## 2024-11-17 RX ADMIN — Medication 1 PACKET(S): at 21:21

## 2024-11-17 RX ADMIN — CHLORHEXIDINE GLUCONATE 1 APPLICATION(S): 1.2 RINSE ORAL at 05:11

## 2024-11-17 RX ADMIN — IPRATROPIUM BROMIDE AND ALBUTEROL SULFATE 3 MILLILITER(S): 2.5; .5 SOLUTION RESPIRATORY (INHALATION) at 23:58

## 2024-11-17 RX ADMIN — IPRATROPIUM BROMIDE AND ALBUTEROL SULFATE 3 MILLILITER(S): 2.5; .5 SOLUTION RESPIRATORY (INHALATION) at 15:10

## 2024-11-17 RX ADMIN — Medication 250 MILLIGRAM(S): at 07:01

## 2024-11-17 RX ADMIN — Medication 5000 UNIT(S): at 18:34

## 2024-11-17 RX ADMIN — LEVETIRACETAM 1500 MILLIGRAM(S): 1000 TABLET ORAL at 05:14

## 2024-11-17 RX ADMIN — LACOSAMIDE 140 MILLIGRAM(S): 10 INJECTION INTRAVENOUS at 05:10

## 2024-11-17 RX ADMIN — LEVETIRACETAM 1500 MILLIGRAM(S): 1000 TABLET ORAL at 18:35

## 2024-11-17 RX ADMIN — Medication 500 MILLIGRAM(S): at 14:39

## 2024-11-17 RX ADMIN — LABETALOL 200 MILLIGRAM(S): 100 TABLET, FILM COATED ORAL at 21:21

## 2024-11-17 RX ADMIN — Medication 1 APPLICATION(S): at 18:32

## 2024-11-17 RX ADMIN — Medication 2: at 15:25

## 2024-11-17 RX ADMIN — LACOSAMIDE 140 MILLIGRAM(S): 10 INJECTION INTRAVENOUS at 18:44

## 2024-11-17 RX ADMIN — HYDRALAZINE HYDROCHLORIDE 25 MILLIGRAM(S): 10 TABLET ORAL at 14:40

## 2024-11-17 RX ADMIN — Medication 50 MILLIGRAM(S): at 14:42

## 2024-11-17 RX ADMIN — LABETALOL 200 MILLIGRAM(S): 100 TABLET, FILM COATED ORAL at 14:40

## 2024-11-17 RX ADMIN — IPRATROPIUM BROMIDE AND ALBUTEROL SULFATE 3 MILLILITER(S): 2.5; .5 SOLUTION RESPIRATORY (INHALATION) at 08:09

## 2024-11-17 RX ADMIN — HYDRALAZINE HYDROCHLORIDE 25 MILLIGRAM(S): 10 TABLET ORAL at 21:21

## 2024-11-17 RX ADMIN — Medication 220 MILLIGRAM(S): at 12:39

## 2024-11-17 RX ADMIN — Medication 1 PACKET(S): at 05:15

## 2024-11-17 RX ADMIN — LABETALOL 200 MILLIGRAM(S): 100 TABLET, FILM COATED ORAL at 05:14

## 2024-11-17 RX ADMIN — AMLODIPINE BESYLATE 10 MILLIGRAM(S): 10 TABLET ORAL at 05:14

## 2024-11-17 NOTE — PROGRESS NOTE ADULT - SUBJECTIVE AND OBJECTIVE BOX
SIMONA KUHN  79y Female    CHIEF COMPLAINT:    Patient is a 79y old  Female who presents with a chief complaint of SDH, Seizures (2024 07:24)    INTERVAL HPI/OVERNIGHT EVENTS:    Patient seen and examined. No acute events overnight. Remains on VEEG    ROS: All other systems are negative.    Vital Signs:    T(F): 97.6 (24 @ 04:00), Max: 97.9 (24 @ 11:35)  HR: 67 (24 @ 04:00) (66 - 72)  BP: 159/69 (24 @ 04:00) (119/65 - 159/69)  RR: 18 (24 @ 04:00) (18 - 18)  SpO2: 100% (24 @ 04:00) (99% - 100%)    2024 07:01  -  2024 07:00  --------------------------------------------------------  IN: 0 mL / OUT: 150 mL / NET: -150 mL    Daily Weight in k.1 (2024 00:00)    POCT Blood Glucose.: 150 mg/dL (2024 07:15)  POCT Blood Glucose.: 160 mg/dL (2024 21:25)  POCT Blood Glucose.: 129 mg/dL (2024 16:29)  POCT Blood Glucose.: 137 mg/dL (2024 11:21)    PHYSICAL EXAM:    GENERAL:  NAD chronically ill appearing   SKIN: No rashes or lesions  HEENT: Atraumatic. Normocephalic.   NECK: Supple, No JVD.   PULMONARY: CTA B/L. No wheezing. No rales  CVS: Normal S1, S2. Rate and Rhythm are regular   ABDOMEN/GI: Soft, Nontender, Nondistended   MSK:  No clubbing or cyanosis   NEUROLOGIC: arousable to frequent stimulation/sternal rub   PSYCH: lethargic     Consultant(s) Notes Reviewed:  [x ] YES  [ ] NO  Care Discussed with Consultants/Other Providers [ x] YES  [ ] NO    LABS:                        8.6    10.69 )-----------( 373      ( 2024 05:30 )             27.3     140  |  109  |  34[H]  ----------------------------<  123[H]  5.1[H]   |  18  |  1.0    Ca    8.9      2024 05:30  Phos  4.4     11-17  Mg     2.0     11-17    RADIOLOGY & ADDITIONAL TESTS:  Imaging or report Personally Reviewed:  [x] YES  [ ] NO  EKG reviewed: [x] YES  [ ] NO    Medications:  Standing  albuterol/ipratropium for Nebulization 3 milliLiter(s) Nebulizer every 8 hours  amantadine Syrup 50 milliGRAM(s) Oral <User Schedule>  amLODIPine   Tablet 10 milliGRAM(s) Oral daily  ascorbic acid 500 milliGRAM(s) Oral daily  bacitracin   Ointment 1 Application(s) Topical two times a day  bacitracin   Ointment 1 Application(s) Topical two times a day  chlorhexidine 2% Cloths 1 Application(s) Topical <User Schedule>  dextrose 5%. 1000 milliLiter(s) IV Continuous <Continuous>  dextrose 5%. 1000 milliLiter(s) IV Continuous <Continuous>  dextrose 50% Injectable 25 Gram(s) IV Push once  dextrose 50% Injectable 12.5 Gram(s) IV Push once  dextrose 50% Injectable 25 Gram(s) IV Push once  glucagon  Injectable 1 milliGRAM(s) IntraMuscular once  heparin   Injectable 5000 Unit(s) SubCutaneous every 12 hours  hydrALAZINE 25 milliGRAM(s) Oral every 8 hours  insulin lispro (ADMELOG) corrective regimen sliding scale   SubCutaneous every 8 hours  labetalol 200 milliGRAM(s) Oral every 8 hours  lacosamide IVPB 200 milliGRAM(s) IV Intermittent every 12 hours  levETIRAcetam   Injectable 1500 milliGRAM(s) IV Push every 12 hours  psyllium Powder 1 Packet(s) Oral every 8 hours  saccharomyces boulardii 250 milliGRAM(s) Oral two times a day  zinc sulfate 220 milliGRAM(s) Oral daily    PRN Meds  acetaminophen     Tablet .. 650 milliGRAM(s) Oral every 6 hours PRN  dextrose Oral Gel 15 Gram(s) Oral once PRN

## 2024-11-17 NOTE — PROGRESS NOTE ADULT - SUBJECTIVE AND OBJECTIVE BOX
Neurology Stroke Progress Note    INTERVAL HPI/OVERNIGHT EVENTS:  79F w/ PMHx of Myelodysplastic syndrome, DM, HTN, CKD, R hemispheric SDH 2.2cm w/ mass effect and 1.1cm MLS , went to OR for evacuation now s/p MMA embo with worsening extracalvarial bleed and had to go back to the OR. Patient then developed persistent AMS and was admitted to NICU to r/o status epilpeticus s/p ketamine drip now weaned off controlled on keppra and vimpat. VEEG with no epileptiform activity in neuro ICU. Patient now downgraded to CEU, currently on vEEG, reports with no clinical seizure activity noted but increased vimpat with seizure resolution but still abundant interictal activity. RR on 11/15 for HR on the 200s with negative EKG but still no seizure activity on VEEG at the time. No ovn events per primary teams.     MEDICATIONS  (STANDING):  amantadine Syrup 50 milliGRAM(s) Oral <User Schedule>  amLODIPine   Tablet 10 milliGRAM(s) Oral daily  ascorbic acid 500 milliGRAM(s) Oral daily  bacitracin   Ointment 1 Application(s) Topical two times a day  bacitracin   Ointment 1 Application(s) Topical two times a day  chlorhexidine 2% Cloths 1 Application(s) Topical <User Schedule>  dextrose 5%. 1000 milliLiter(s) (50 mL/Hr) IV Continuous <Continuous>  dextrose 5%. 1000 milliLiter(s) (100 mL/Hr) IV Continuous <Continuous>  dextrose 50% Injectable 25 Gram(s) IV Push once  dextrose 50% Injectable 12.5 Gram(s) IV Push once  dextrose 50% Injectable 25 Gram(s) IV Push once  glucagon  Injectable 1 milliGRAM(s) IntraMuscular once  heparin   Injectable 5000 Unit(s) SubCutaneous every 12 hours  hydrALAZINE 25 milliGRAM(s) Oral every 8 hours  insulin lispro (ADMELOG) corrective regimen sliding scale   SubCutaneous every 8 hours  labetalol 200 milliGRAM(s) Oral every 8 hours  lacosamide IVPB 200 milliGRAM(s) IV Intermittent every 12 hours  levETIRAcetam   Injectable 1500 milliGRAM(s) IV Push every 12 hours  psyllium Powder 1 Packet(s) Oral every 8 hours  saccharomyces boulardii 250 milliGRAM(s) Oral two times a day  zinc sulfate 220 milliGRAM(s) Oral daily    MEDICATIONS  (PRN):  acetaminophen     Tablet .. 650 milliGRAM(s) Oral every 6 hours PRN Temp greater or equal to 38C (100.4F)  dextrose Oral Gel 15 Gram(s) Oral once PRN Blood Glucose LESS THAN 70 milliGRAM(s)/deciliter      Allergies    No Known Allergies    Intolerances        ROS: As per HPI, otherwise negative    Vital Signs Last 24 Hrs  T(C): 36.4 (17 Nov 2024 04:00), Max: 36.6 (16 Nov 2024 11:35)  T(F): 97.6 (17 Nov 2024 04:00), Max: 97.9 (16 Nov 2024 11:35)  HR: 67 (17 Nov 2024 04:00) (66 - 75)  BP: 159/69 (17 Nov 2024 04:00) (119/65 - 159/69)  BP(mean): 88 (16 Nov 2024 20:00) (86 - 107)  RR: 18 (17 Nov 2024 04:00) (18 - 18)  SpO2: 100% (17 Nov 2024 04:00) (99% - 100%)    Parameters below as of 16 Nov 2024 20:00  Patient On (Oxygen Delivery Method): nasal cannula        Physical exam:    Neurologic:  Mental status:   Cranial nerves:   II: visual fields are full to confrontation. pupils equally round and reactive to light,   III, IV, VI: EOMI without nystagmus  V:  V1-V3 sensation intact,   VII: no facial droop, facie is symmetric with normal eye closure and smile  VIII: hearing is intact to finger rub  IX, X: Uvula is midline and soft palate rises symmetrically  XI: head turning and shoulder shrug are intact.  XII: tongue midline  Motor: Normal bulk and tone, strength 5/5 in b/l UE and LE,  strength 5/5. No pronator drift  Sensation: intact to light touch. No neglect.  Coordination: No dysmetria on finger-to-nose and heel-to-shin  Reflexes: downgoing toes bilaterally  Gait: narrow and steady, no ataxia. Romberg negative        LABS:                        8.6    10.69 )-----------( 373      ( 17 Nov 2024 05:30 )             27.3     11-17    140  |  109  |  34[H]  ----------------------------<  123[H]  5.1[H]   |  18  |  1.0    Ca    8.9      17 Nov 2024 05:30  Phos  4.4     11-17  Mg     2.0     11-17        Urinalysis Basic - ( 17 Nov 2024 05:30 )    Color: x / Appearance: x / SG: x / pH: x  Gluc: 123 mg/dL / Ketone: x  / Bili: x / Urobili: x   Blood: x / Protein: x / Nitrite: x   Leuk Esterase: x / RBC: x / WBC x   Sq Epi: x / Non Sq Epi: x / Bacteria: x        RADIOLOGY & ADDITIONAL TESTS:     Neurology Stroke Progress Note    INTERVAL HPI/OVERNIGHT EVENTS:  79F w/ PMHx of Myelodysplastic syndrome, DM, HTN, CKD, R hemispheric SDH 2.2cm w/ mass effect and 1.1cm MLS , went to OR for evacuation now s/p MMA embo with worsening extracalvarial bleed and had to go back to the OR. Patient then developed persistent AMS and was admitted to NICU to r/o status epilpeticus s/p ketamine drip now weaned off controlled on keppra and vimpat. VEEG with no epileptiform activity in neuro ICU. Patient now downgraded to CEU, currently on vEEG, reports with no clinical seizure activity noted but increased vimpat with seizure resolution but still abundant interictal activity. RR on 11/15 for HR on the 200s with negative EKG but still no seizure activity on VEEG at the time. No ovn events per primary teams.     MEDICATIONS  (STANDING):  amantadine Syrup 50 milliGRAM(s) Oral <User Schedule>  amLODIPine   Tablet 10 milliGRAM(s) Oral daily  ascorbic acid 500 milliGRAM(s) Oral daily  bacitracin   Ointment 1 Application(s) Topical two times a day  bacitracin   Ointment 1 Application(s) Topical two times a day  chlorhexidine 2% Cloths 1 Application(s) Topical <User Schedule>  dextrose 5%. 1000 milliLiter(s) (50 mL/Hr) IV Continuous <Continuous>  dextrose 5%. 1000 milliLiter(s) (100 mL/Hr) IV Continuous <Continuous>  dextrose 50% Injectable 25 Gram(s) IV Push once  dextrose 50% Injectable 12.5 Gram(s) IV Push once  dextrose 50% Injectable 25 Gram(s) IV Push once  glucagon  Injectable 1 milliGRAM(s) IntraMuscular once  heparin   Injectable 5000 Unit(s) SubCutaneous every 12 hours  hydrALAZINE 25 milliGRAM(s) Oral every 8 hours  insulin lispro (ADMELOG) corrective regimen sliding scale   SubCutaneous every 8 hours  labetalol 200 milliGRAM(s) Oral every 8 hours  lacosamide IVPB 200 milliGRAM(s) IV Intermittent every 12 hours  levETIRAcetam   Injectable 1500 milliGRAM(s) IV Push every 12 hours  psyllium Powder 1 Packet(s) Oral every 8 hours  saccharomyces boulardii 250 milliGRAM(s) Oral two times a day  zinc sulfate 220 milliGRAM(s) Oral daily    MEDICATIONS  (PRN):  acetaminophen     Tablet .. 650 milliGRAM(s) Oral every 6 hours PRN Temp greater or equal to 38C (100.4F)  dextrose Oral Gel 15 Gram(s) Oral once PRN Blood Glucose LESS THAN 70 milliGRAM(s)/deciliter      Allergies    No Known Allergies    Intolerances        ROS: As per HPI, otherwise negative    Vital Signs Last 24 Hrs  T(C): 36.4 (17 Nov 2024 04:00), Max: 36.6 (16 Nov 2024 11:35)  T(F): 97.6 (17 Nov 2024 04:00), Max: 97.9 (16 Nov 2024 11:35)  HR: 67 (17 Nov 2024 04:00) (66 - 75)  BP: 159/69 (17 Nov 2024 04:00) (119/65 - 159/69)  BP(mean): 88 (16 Nov 2024 20:00) (86 - 107)  RR: 18 (17 Nov 2024 04:00) (18 - 18)  SpO2: 100% (17 Nov 2024 04:00) (99% - 100%)    Parameters below as of 16 Nov 2024 20:00  Patient On (Oxygen Delivery Method): nasal cannula        Physical exam:    Neurologic:  Mental status: lethargic, minimally responsive to noxious stimuli   Cranial nerves:   II: pupils equally round and reactive to light,   III, IV, VI: EOMI without nystagmus  VII: no facial droop  Motor: Normal bulk and tone, no effort against gravity   Sensation: withdraws from painful stimuli       LABS:                        8.6    10.69 )-----------( 373      ( 17 Nov 2024 05:30 )             27.3     11-17    140  |  109  |  34[H]  ----------------------------<  123[H]  5.1[H]   |  18  |  1.0    Ca    8.9      17 Nov 2024 05:30  Phos  4.4     11-17  Mg     2.0     11-17        Urinalysis Basic - ( 17 Nov 2024 05:30 )    Color: x / Appearance: x / SG: x / pH: x  Gluc: 123 mg/dL / Ketone: x  / Bili: x / Urobili: x   Blood: x / Protein: x / Nitrite: x   Leuk Esterase: x / RBC: x / WBC x   Sq Epi: x / Non Sq Epi: x / Bacteria: x        RADIOLOGY & ADDITIONAL TESTS:     Neurology Stroke Progress Note    INTERVAL HPI/OVERNIGHT EVENTS:  79F w/ PMHx of Myelodysplastic syndrome, DM, HTN, CKD, R hemispheric SDH 2.2cm w/ mass effect and 1.1cm MLS , went to OR for evacuation now s/p MMA embo with worsening extracalvarial bleed and had to go back to the OR. Patient then developed persistent AMS and was admitted to NICU to r/o status epilepticus s/p ketamine drip now weaned off controlled on keppra and vimpat. VEEG with no epileptiform activity in neuro ICU. Patient now downgraded to CEU, currently on vEEG, reports with no clinical seizure activity noted but increased vimpat with seizure resolution but still abundant interictal activity. RR on 11/15 for HR on the 200s with negative EKG but still no seizure activity on VEEG at the time. No ovn events per primary teams.     MEDICATIONS  (STANDING):  amantadine Syrup 50 milliGRAM(s) Oral <User Schedule>  amLODIPine   Tablet 10 milliGRAM(s) Oral daily  ascorbic acid 500 milliGRAM(s) Oral daily  bacitracin   Ointment 1 Application(s) Topical two times a day  bacitracin   Ointment 1 Application(s) Topical two times a day  chlorhexidine 2% Cloths 1 Application(s) Topical <User Schedule>  dextrose 5%. 1000 milliLiter(s) (50 mL/Hr) IV Continuous <Continuous>  dextrose 5%. 1000 milliLiter(s) (100 mL/Hr) IV Continuous <Continuous>  dextrose 50% Injectable 25 Gram(s) IV Push once  dextrose 50% Injectable 12.5 Gram(s) IV Push once  dextrose 50% Injectable 25 Gram(s) IV Push once  glucagon  Injectable 1 milliGRAM(s) IntraMuscular once  heparin   Injectable 5000 Unit(s) SubCutaneous every 12 hours  hydrALAZINE 25 milliGRAM(s) Oral every 8 hours  insulin lispro (ADMELOG) corrective regimen sliding scale   SubCutaneous every 8 hours  labetalol 200 milliGRAM(s) Oral every 8 hours  lacosamide IVPB 200 milliGRAM(s) IV Intermittent every 12 hours  levETIRAcetam   Injectable 1500 milliGRAM(s) IV Push every 12 hours  psyllium Powder 1 Packet(s) Oral every 8 hours  saccharomyces boulardii 250 milliGRAM(s) Oral two times a day  zinc sulfate 220 milliGRAM(s) Oral daily    MEDICATIONS  (PRN):  acetaminophen     Tablet .. 650 milliGRAM(s) Oral every 6 hours PRN Temp greater or equal to 38C (100.4F)  dextrose Oral Gel 15 Gram(s) Oral once PRN Blood Glucose LESS THAN 70 milliGRAM(s)/deciliter      Allergies    No Known Allergies    Intolerances        ROS: As per HPI, otherwise negative    Vital Signs Last 24 Hrs  T(C): 36.4 (17 Nov 2024 04:00), Max: 36.6 (16 Nov 2024 11:35)  T(F): 97.6 (17 Nov 2024 04:00), Max: 97.9 (16 Nov 2024 11:35)  HR: 67 (17 Nov 2024 04:00) (66 - 75)  BP: 159/69 (17 Nov 2024 04:00) (119/65 - 159/69)  BP(mean): 88 (16 Nov 2024 20:00) (86 - 107)  RR: 18 (17 Nov 2024 04:00) (18 - 18)  SpO2: 100% (17 Nov 2024 04:00) (99% - 100%)    Parameters below as of 16 Nov 2024 20:00  Patient On (Oxygen Delivery Method): nasal cannula        Physical exam:    Neurologic:  Mental status: lethargic, minimally responsive to noxious stimuli   Cranial nerves:   II: pupils equally round and reactive to light,   VII: no facial droop  Motor: Normal bulk and tone, no effort against gravity in all extremities   Sensation: withdraws from painful stimuli       LABS:                        8.6    10.69 )-----------( 373      ( 17 Nov 2024 05:30 )             27.3     11-17    140  |  109  |  34[H]  ----------------------------<  123[H]  5.1[H]   |  18  |  1.0    Ca    8.9      17 Nov 2024 05:30  Phos  4.4     11-17  Mg     2.0     11-17        Urinalysis Basic - ( 17 Nov 2024 05:30 )    Color: x / Appearance: x / SG: x / pH: x  Gluc: 123 mg/dL / Ketone: x  / Bili: x / Urobili: x   Blood: x / Protein: x / Nitrite: x   Leuk Esterase: x / RBC: x / WBC x   Sq Epi: x / Non Sq Epi: x / Bacteria: x        RADIOLOGY & ADDITIONAL TESTS:

## 2024-11-17 NOTE — EEG REPORT - NS EEG TEXT BOX
SIMONA KUHN [id number]     Study Date: 11/16/24 07:13 - 11/17/24 07:29  Duration: 24 hr 15 min  --------------------------------------------------------------------------------------------------  History:  CC/ HPI Patient is a 79y old  Female who presents with a chief complaint of SDH, Seizures (17 Nov 2024 09:07)    MEDICATIONS  (STANDING):  albuterol/ipratropium for Nebulization 3 milliLiter(s) Nebulizer every 8 hours  amantadine Syrup 50 milliGRAM(s) Oral <User Schedule>  amLODIPine   Tablet 10 milliGRAM(s) Oral daily  ascorbic acid 500 milliGRAM(s) Oral daily  bacitracin   Ointment 1 Application(s) Topical two times a day  bacitracin   Ointment 1 Application(s) Topical two times a day  chlorhexidine 2% Cloths 1 Application(s) Topical <User Schedule>  dextrose 5%. 1000 milliLiter(s) (50 mL/Hr) IV Continuous <Continuous>  dextrose 5%. 1000 milliLiter(s) (100 mL/Hr) IV Continuous <Continuous>  dextrose 50% Injectable 25 Gram(s) IV Push once  dextrose 50% Injectable 12.5 Gram(s) IV Push once  dextrose 50% Injectable 25 Gram(s) IV Push once  glucagon  Injectable 1 milliGRAM(s) IntraMuscular once  heparin   Injectable 5000 Unit(s) SubCutaneous every 12 hours  hydrALAZINE 25 milliGRAM(s) Oral every 8 hours  insulin lispro (ADMELOG) corrective regimen sliding scale   SubCutaneous every 8 hours  labetalol 200 milliGRAM(s) Oral every 8 hours  lacosamide IVPB 200 milliGRAM(s) IV Intermittent every 12 hours  levETIRAcetam   Injectable 1500 milliGRAM(s) IV Push every 12 hours  psyllium Powder 1 Packet(s) Oral every 8 hours  saccharomyces boulardii 250 milliGRAM(s) Oral two times a day  zinc sulfate 220 milliGRAM(s) Oral daily    --------------------------------------------------------------------------------------------------  Study Interpretation:    [[[Abbreviation Key:  PDR=alpha rhythm/posterior dominant rhythm. A-P=anterior posterior.  Amplitude: ‘very low’:<20; ‘low’:20-49; ‘medium’:; ‘high’:>150uV.  Persistence for periodic/rhythmic patterns (% of epoch) ‘rare’:<1%; ‘occasional’:1-10%; ‘frequent’:10-50%; ‘abundant’:50-90%; ‘continuous’:>90%.  Persistence for sporadic discharges: ‘rare’:<1/hr; ‘occasional’:1/min-1/hr; ‘frequent’:>1/min; ‘abundant’:>1/10 sec.  RPP=rhythmic and periodic patterns; GRDA=generalized rhythmic delta activity; FIRDA=frontal intermittent GRDA; LRDA=lateralized rhythmic delta activity; TIRDA=temporal intermittent rhythmic delta activity;  LPD=PLED=lateralized periodic discharges; GPD=generalized periodic discharges; BIPDs =bilateral independent periodic discharges; Mf=multifocal; SIRPDs=stimulus induced rhythmic, periodic, or ictal appearing discharges; BIRDs=brief potentially ictal rhythmic discharges >4 Hz, lasting .5-10s; PFA (paroxysmal bursts >13 Hz or =8 Hz <10s).  Modifiers: +F=with fast component; +S=with spike component; +R=with rhythmic component.  S-B=burst suppression pattern.  Max=maximal. N1-drowsy; N2-stage II sleep; N3-slow wave sleep. SSS/BETS=small sharp spikes/benign epileptiform transients of sleep. HV=hyperventilation; PS=photic stimulation]]]    Daily EEG Visual Analysis    FINDINGS:      Background:  The background is continuous and asymmetric. The predominant background in the most wakeful state consists of diffuse polymorphic delta and theta slowing. There is no posterior dominant rhythm.   There is right temporal breach effect characterized by intermittently higher amplitudes and intermittent faster and sharper activity.    Background Slowing:  Generalized slowing: As above  Focal slowing: Occasional right temporal focal polymorphic delta slowing    State Changes:   Drowsiness is characterized by fragmentation, attenuation, and slowing of the background activity.  Stage 2 sleep is characterized by sleep spindles that are at times symmetric and at times of higher amplitude on the right.    Interictal Findings:  Continuous right posterior temporal (P8 maximal, often with broad field) sharp/spike-wave discharges, periodic at 1-2.5 Hz, fluctuating without evolution, most prevalent in wakefulness, decreasing in prevalence in drowsiness and sleep. No clinical correlate seen on video.  Occasional right frontal (Fp2/F4) sharp waves.  Occasional right temporal (T8) sharp waves/spikes.    Electrographic and Electroclinical seizures:  None    Other Clinical Events:  None    Activation Procedures:   Hyperventilation is not performed.    Photic stimulation is not performed.    Artifacts:  Intermittent myogenic and movement artifacts are present.    Single-lead EKG: Regular rhythm    EEG Classification / Summary:  Abnormal EEG in the awake, drowsy, and asleep states.   -Continuous right posterior temporal LPDs at 1-2.5 Hz, fluctuating without evolution, most prevalent in wakefulness, decreasing in prevalence in drowsiness and sleep  -Occasional right frontal  and right temporal sharp waves  -Right temporal focal slowing and breach effect  -Moderate diffuse slowing    Clinical Impression:  -Right posterior temporal ictal-interictal continuum in wakefulness, decreasing in drowsiness and sleep  -Additional risk of focal-onset seizures from the right frontal and right temporal regions  -Right temporal focal cerebral dysfunction, structural and/or functional in etiology, with evidence of skull defect in this region.  -Moderate diffuse/multifocal cerebral dysfunction is nonspecific in etiology.         -------------------------------------------------------------------------------------------------------    Yamini Lancaster MD  Attending Physician, [facility]    -------------------------------------------------------------------------------------------------------    To reach EEG technologist:  Please use the pager number for the appropriate hospital or contact the .  At [facility] - Pager #: [phone]    To reach EEG-reading physician:  EEG Reading Room Phone #: [phone]  Epilepsy Answering Service after 5PM and before 8:30AM: Phone #: [phone]

## 2024-11-17 NOTE — PROGRESS NOTE ADULT - ASSESSMENT
79F w/ PMHx of Myelodysplastic syndrome, DM, HTN, CKD, R hemispheric SDH 2.2cm w/ mass effect and 1.1cm MLS , went to OR for evacuation now s/p MMA embo with worsening extracalvarial bleed and had to go back to the OR. Patient then developed persistent AMS and was admitted to NICU to r/o status epilpeticus s/p ketamine drip now weaned off controlled on keppra and vimpat. VEEG with no epileptiform activity in neuro ICU. Patient now downgraded to CEU, currently on vEEG, reports with no clinical seizure activity noted but increased vimpat with seizure resolution but still abundant interictal activity. RR on 11/15 for HR on the 200s with negative EKG but still no seizure activity on VEEG at the time. No ovn events per primary teams.  79F w/ PMHx of Myelodysplastic syndrome, DM, HTN, CKD, R hemispheric SDH 2.2cm w/ mass effect and 1.1cm MLS , went to OR for evacuation now s/p MMA embo with worsening extracalvarial bleed and had to go back to the OR. Patient then developed persistent AMS and was admitted to NICU to r/o status epilpeticus s/p ketamine drip now weaned off controlled on keppra and vimpat. VEEG with no epileptiform activity in neuro ICU. Patient now downgraded to CEU, currently on vEEG, reports with no clinical seizure activity noted but increased vimpat with seizure resolution but still abundant interictal activity. RR on 11/15 for HR on the 200s with negative EKG but still no seizure activity on VEEG at the time. No ovn events per primary team. EEG report today with no epileptiform activity.     RECS:  - continue vEEG  - continue vimpat 200 mg BID  - increase keppra to 1500 mg BID  - Ativan 2mg IV for GTCs > 2 min only  - Neurological assessment Q4hrs  - Seizure precautions  - Maintain Mg> 2 mmol/l    Discussed with Dr. Hatfield 79F w/ PMHx of Myelodysplastic syndrome, DM, HTN, CKD, R hemispheric SDH 2.2cm w/ mass effect and 1.1cm MLS , went to OR for evacuation now s/p MMA embo with worsening extracalvarial bleed and had to go back to the OR. Patient then developed persistent AMS and was admitted to NICU to r/o status epilpeticus s/p ketamine drip now weaned off controlled on keppra and vimpat. VEEG with no epileptiform activity in neuro ICU. Patient now downgraded to CEU, currently on vEEG, reports with no clinical seizure activity noted but increased vimpat with seizure resolution but still abundant interictal activity. RR on 11/15 for HR on the 200s with negative EKG but still no seizure activity on VEEG at the time. No ovn events per primary team. EEG report today with no epileptiform activity.     RECS:  - continue vEEG  - continue vimpat 200 mg BID  - continue keppra 1500 mg BID  - Ativan 2mg IV for GTCs > 2 min only  - Routine neurochecks  - Seizure precautions  - Maintain Mg> 2 mmol/l    Discussed with Dr. Hatfield

## 2024-11-17 NOTE — PROGRESS NOTE ADULT - ASSESSMENT
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 While in NSICU, patient underwent MMA embolization and post operatively was stable, however, on 10/16 she was noted to have leaking from her crani site. STAT CTH showed increase in SDH and extracalvarial collection, NSG removed 25cc of blood and was re-sutured. Patient was then downgraded to the floor, was started on zosyn for UTI, and was discharged to short term rehab on 10/27. Patient returns to [facility] due to worsening lethargy, admitted to NCCU due to concern for seizures, now downgraded to SDU    Recently diagnosed SDH  Concern for Status epilepticus  Dysphagia s/p PEG on 11/12  Complete immobility due to SDH/Seizures   - s/p ketamine gtt in NCCU  - MR Brain 11/5 - without acuity; stable SDH  - serial CTH stable   - SBP goal: 110 -  <150  - s/p EEG + for seizure activity. Neurology informed, patient now on VEEG, AEDS adjusted to keppra 1500 mg BID and Lacosamide 200 q12.  - monitor AED levels   - c/w amantidine 50mg q 12 daily and monitor for clinical improvement   - s/p PEG placement 11/12, so far tolerating feeds     UTI with low grade temps - resolved   - UCx + enterococcus. s/p ampicillin  - 11/12 overnight temp of 100.3-> repeat blood cx NGTD. no further fevers   - monitor diarrhea     HTN   - SBP goal: 110 -  <150  - - Echo  10/9/24: Normal global left ventricular systolic function. Left ventricular ejection fraction, by visual estimation, is 65 to 70%  - c/w lisinopril 20mg , labetalol 200mg q 8; c/w norvasc 10mg.      MARCO on CKD improving  - monitor BMP, c/w bladder scans q8H    MDS with Anemia, receives PRN PRBC  - s/p 1u PRBC transfusion 11/7 and 11/14   - monitor CBC< transfuse if Hb <4  - OP fu with Dr Wade     Overall prognosis is guarded  Pending: neurochecks per neurology, monitor fever curve/off abx, labs,cw  tube feeds, monitor diarrhea    Patient seen at bedside, total time spent to evaluate and treat the patient's acute illness and chronic medical conditions as well as time spent reviewing prior records, labs, radiology, documenting in electronic medical records,  discussing medical plan with   medical team was more than 45 minutes with >50% of time spent face to face with patient, discussing with patient/family as well as coordination of care

## 2024-11-18 LAB
ANION GAP SERPL CALC-SCNC: 8 MMOL/L — SIGNIFICANT CHANGE UP (ref 7–14)
BASOPHILS # BLD AUTO: 0.07 K/UL — SIGNIFICANT CHANGE UP (ref 0–0.2)
BASOPHILS NFR BLD AUTO: 0.7 % — SIGNIFICANT CHANGE UP (ref 0–1)
BUN SERPL-MCNC: 34 MG/DL — HIGH (ref 10–20)
CALCIUM SERPL-MCNC: 9.4 MG/DL — SIGNIFICANT CHANGE UP (ref 8.4–10.5)
CHLORIDE SERPL-SCNC: 108 MMOL/L — SIGNIFICANT CHANGE UP (ref 98–110)
CO2 SERPL-SCNC: 23 MMOL/L — SIGNIFICANT CHANGE UP (ref 17–32)
CREAT SERPL-MCNC: 0.9 MG/DL — SIGNIFICANT CHANGE UP (ref 0.7–1.5)
CULTURE RESULTS: SIGNIFICANT CHANGE UP
EGFR: 65 ML/MIN/1.73M2 — SIGNIFICANT CHANGE UP
EOSINOPHIL # BLD AUTO: 0.31 K/UL — SIGNIFICANT CHANGE UP (ref 0–0.7)
EOSINOPHIL NFR BLD AUTO: 3.2 % — SIGNIFICANT CHANGE UP (ref 0–8)
GLUCOSE BLDC GLUCOMTR-MCNC: 114 MG/DL — HIGH (ref 70–99)
GLUCOSE BLDC GLUCOMTR-MCNC: 147 MG/DL — HIGH (ref 70–99)
GLUCOSE BLDC GLUCOMTR-MCNC: 188 MG/DL — HIGH (ref 70–99)
GLUCOSE BLDC GLUCOMTR-MCNC: 97 MG/DL — SIGNIFICANT CHANGE UP (ref 70–99)
GLUCOSE SERPL-MCNC: 87 MG/DL — SIGNIFICANT CHANGE UP (ref 70–99)
HCT VFR BLD CALC: 25.9 % — LOW (ref 37–47)
HGB BLD-MCNC: 8.3 G/DL — LOW (ref 12–16)
IMM GRANULOCYTES NFR BLD AUTO: 0.6 % — HIGH (ref 0.1–0.3)
LEVETIRACETAM SERPL-MCNC: 127 UG/ML — HIGH (ref 10–40)
LYMPHOCYTES # BLD AUTO: 1.06 K/UL — LOW (ref 1.2–3.4)
LYMPHOCYTES # BLD AUTO: 11 % — LOW (ref 20.5–51.1)
MAGNESIUM SERPL-MCNC: 2 MG/DL — SIGNIFICANT CHANGE UP (ref 1.8–2.4)
MCHC RBC-ENTMCNC: 29.9 PG — SIGNIFICANT CHANGE UP (ref 27–31)
MCHC RBC-ENTMCNC: 32 G/DL — SIGNIFICANT CHANGE UP (ref 32–37)
MCV RBC AUTO: 93.2 FL — SIGNIFICANT CHANGE UP (ref 81–99)
MONOCYTES # BLD AUTO: 0.91 K/UL — HIGH (ref 0.1–0.6)
MONOCYTES NFR BLD AUTO: 9.5 % — HIGH (ref 1.7–9.3)
NEUTROPHILS # BLD AUTO: 7.21 K/UL — HIGH (ref 1.4–6.5)
NEUTROPHILS NFR BLD AUTO: 75 % — SIGNIFICANT CHANGE UP (ref 42.2–75.2)
NRBC # BLD: 0 /100 WBCS — SIGNIFICANT CHANGE UP (ref 0–0)
PHOSPHATE SERPL-MCNC: 3.8 MG/DL — SIGNIFICANT CHANGE UP (ref 2.1–4.9)
PLATELET # BLD AUTO: 408 K/UL — HIGH (ref 130–400)
PMV BLD: 9.9 FL — SIGNIFICANT CHANGE UP (ref 7.4–10.4)
POTASSIUM SERPL-MCNC: 5.3 MMOL/L — HIGH (ref 3.5–5)
POTASSIUM SERPL-SCNC: 5.3 MMOL/L — HIGH (ref 3.5–5)
RBC # BLD: 2.78 M/UL — LOW (ref 4.2–5.4)
RBC # FLD: 14.6 % — HIGH (ref 11.5–14.5)
SODIUM SERPL-SCNC: 139 MMOL/L — SIGNIFICANT CHANGE UP (ref 135–146)
SPECIMEN SOURCE: SIGNIFICANT CHANGE UP
WBC # BLD: 9.62 K/UL — SIGNIFICANT CHANGE UP (ref 4.8–10.8)
WBC # FLD AUTO: 9.62 K/UL — SIGNIFICANT CHANGE UP (ref 4.8–10.8)

## 2024-11-18 PROCEDURE — 99232 SBSQ HOSP IP/OBS MODERATE 35: CPT

## 2024-11-18 PROCEDURE — 74018 RADEX ABDOMEN 1 VIEW: CPT | Mod: 26

## 2024-11-18 PROCEDURE — 95720 EEG PHY/QHP EA INCR W/VEEG: CPT

## 2024-11-18 PROCEDURE — 99233 SBSQ HOSP IP/OBS HIGH 50: CPT

## 2024-11-18 RX ADMIN — LABETALOL 200 MILLIGRAM(S): 100 TABLET, FILM COATED ORAL at 13:40

## 2024-11-18 RX ADMIN — Medication 50 MILLIGRAM(S): at 06:10

## 2024-11-18 RX ADMIN — Medication 1 APPLICATION(S): at 06:11

## 2024-11-18 RX ADMIN — CHLORHEXIDINE GLUCONATE 1 APPLICATION(S): 1.2 RINSE ORAL at 06:11

## 2024-11-18 RX ADMIN — AMLODIPINE BESYLATE 10 MILLIGRAM(S): 10 TABLET ORAL at 06:11

## 2024-11-18 RX ADMIN — LABETALOL 200 MILLIGRAM(S): 100 TABLET, FILM COATED ORAL at 22:40

## 2024-11-18 RX ADMIN — LEVETIRACETAM 500 MILLIGRAM(S): 1000 TABLET ORAL at 17:11

## 2024-11-18 RX ADMIN — Medication 250 MILLIGRAM(S): at 06:10

## 2024-11-18 RX ADMIN — HYDRALAZINE HYDROCHLORIDE 25 MILLIGRAM(S): 10 TABLET ORAL at 13:40

## 2024-11-18 RX ADMIN — LACOSAMIDE 140 MILLIGRAM(S): 10 INJECTION INTRAVENOUS at 17:10

## 2024-11-18 RX ADMIN — Medication 1 PACKET(S): at 06:10

## 2024-11-18 RX ADMIN — Medication 5000 UNIT(S): at 06:10

## 2024-11-18 RX ADMIN — Medication 1 PACKET(S): at 13:41

## 2024-11-18 RX ADMIN — Medication 2: at 14:00

## 2024-11-18 RX ADMIN — Medication 5000 UNIT(S): at 17:09

## 2024-11-18 RX ADMIN — Medication 500 MILLIGRAM(S): at 11:22

## 2024-11-18 RX ADMIN — LACOSAMIDE 140 MILLIGRAM(S): 10 INJECTION INTRAVENOUS at 06:11

## 2024-11-18 RX ADMIN — HYDRALAZINE HYDROCHLORIDE 25 MILLIGRAM(S): 10 TABLET ORAL at 22:40

## 2024-11-18 RX ADMIN — LABETALOL 200 MILLIGRAM(S): 100 TABLET, FILM COATED ORAL at 06:10

## 2024-11-18 RX ADMIN — LEVETIRACETAM 1500 MILLIGRAM(S): 1000 TABLET ORAL at 06:10

## 2024-11-18 RX ADMIN — Medication 1 PACKET(S): at 22:40

## 2024-11-18 RX ADMIN — HYDRALAZINE HYDROCHLORIDE 25 MILLIGRAM(S): 10 TABLET ORAL at 06:10

## 2024-11-18 RX ADMIN — Medication 250 MILLIGRAM(S): at 17:09

## 2024-11-18 RX ADMIN — Medication 50 MILLIGRAM(S): at 13:40

## 2024-11-18 RX ADMIN — Medication 1 APPLICATION(S): at 17:10

## 2024-11-18 RX ADMIN — Medication 220 MILLIGRAM(S): at 11:22

## 2024-11-18 NOTE — PROGRESS NOTE ADULT - SUBJECTIVE AND OBJECTIVE BOX
NUTRITION SUPPORT TEAM  -  PROGRESS NOTE     Interval Events:  pt opens eyes, does not follow commands  EEG i progress when pt seen this morning  pt seen by neuro - spoke to them at bedside  abd soft, ND, NT, GT in place with no leak or surrounding erythema  per pt's RN, + soft pasty brown stool ~ once daily, no diarrhea, no loose or odd-colored stool  no Banatrol given (as is appropriate)    VITALS:  T(F): 97.6 (11-18 @ 12:03), Max: 97.8 (11-18 @ 07:59)  HR: 77 (11-18 @ 14:00) (77 - 105)  BP: 147/68 (11-18 @ 14:00) (103/59 - 147/68)  RR: 18 (11-18 @ 14:00) (18 - 20)  SpO2: 93% (11-18 @ 14:00) (93% - 96%)    HEIGHT/WEIGHT/BMI:   Height (cm): 162.6 (11-12), 162.6 (10-09), 157.5 (05-23), 157.5 (03-21)  Weight (kg): 66.4 (11-12), 64.3 (10-09), 61.2 (05-23), 63 (02-01)  BMI (kg/m2): 25.1 (11-12), 24.3 (10-09), 24.7 (05-23), 25.4 (03-21)    I/Os:   11-17-24 @ 07:01  -  11-18-24 @ 07:00  --------------------------------------------------------  IN:  Total IN: 0 mL - no documentation of feeds given 11/16 or 17  OUT:    Voided (mL): 800 mL  Total OUT: 800 mL  Total NET: -800 mL    STANDING MEDICATIONS:   amantadine Syrup 50 milliGRAM(s) Oral <User Schedule>  amLODIPine   Tablet 10 milliGRAM(s) Oral daily  ascorbic acid 500 milliGRAM(s) Oral daily  bacitracin   Ointment 1 Application(s) Topical two times a day  bacitracin   Ointment 1 Application(s) Topical two times a day  chlorhexidine 2% Cloths 1 Application(s) Topical <User Schedule>  heparin   Injectable 5000 Unit(s) SubCutaneous every 12 hours  hydrALAZINE 25 milliGRAM(s) Oral every 8 hours  insulin lispro (ADMELOG) corrective regimen sliding scale   SubCutaneous every 8 hours  labetalol 200 milliGRAM(s) Oral every 8 hours  lacosamide IVPB 200 milliGRAM(s) IV Intermittent every 12 hours  levETIRAcetam   Injectable 1500 milliGRAM(s) IV Push every 12 hours  psyllium Powder 1 Packet(s) Oral every 8 hours  saccharomyces boulardii 250 milliGRAM(s) Oral two times a day  zinc sulfate 220 milliGRAM(s) Oral daily    LABS:                         8.3    9.62  )-----------( 408      ( 18 Nov 2024 05:57 )             25.9     139  |  108  |  34[H]  ----------------------------<  87          (11-18-24 @ 05:57)  5.3[H]   |  23  |  0.9    Ca    9.4          (11-18-24 @ 05:57)  Phos  3.8         (11-18-24 @ 05:57)  Mg     2.0         (11-18-24 @ 05:57)    Triglycerides, Serum: 86 mg/dL (11-04 @ 05:16)  Folate: 5.1 ng/mL (10-24 @ 11:14)  Vitamin B12, Serum: 1311 pg/mL (11-08 @ 04:47)    Blood Glucose (Past 24 hours):  188 mg/dL (11-18 @ 13:57)  147 mg/dL (11-18 @ 11:36)  97 mg/dL (11-18 @ 05:45)  201 mg/dL (11-17 @ 20:48)  132 mg/dL (11-17 @ 16:25)    DIET:   Diet, NPO with Tube Feed:   Tube Feeding Modality: Gastrostomy  Glucerna 1.2 Ash  Total Volume for 24 Hours (mL): 1200 ml/d  Bolus Feed Instructions:  360 ml at 7 am, 480 ml at 1 pm, 360 ml at 7 pm  check poc glucose and give any indicated insulin prior to each feeding  Free Water Flush Instructions:  flush water 50 ml before and 100 ml after each feeding  Banatrol TF     Qty per Day:  3 (11-15-24 @ 11:48) [Active]  note that Banatrol is to be given PRN loose BM, at nurse's discretion - and has not been needed nor given at least last 2 days

## 2024-11-18 NOTE — CHART NOTE - NSCHARTNOTESELECT_GEN_ALL_CORE
Anesthesia/Event Note
Neurology/Event Note
RD Follow Up/Nutrition Services
Transfer Note
GI/Event Note
[facility]/Event Note
RD Follow Up/Nutrition Services
Transfer Note
Transfer Note

## 2024-11-18 NOTE — PROGRESS NOTE ADULT - SUBJECTIVE AND OBJECTIVE BOX
SIMONA KUHN  79y Female    CHIEF COMPLAINT:    Patient is a 79y old  Female who presents with a chief complaint of SDH, Seizures (2024 09:07)    INTERVAL HPI/OVERNIGHT EVENTS:    Patient seen and examined. No acute events overnight. on room air, on veeg     ROS: All other systems are negative.    Vital Signs:    T(F): 97.8 (24 @ 07:59), Max: 98.8 (24 @ 16:00)  HR: 105 (24 @ 07:59) (73 - 105)  BP: 103/59 (24 @ 07:59) (103/59 - 168/68)  RR: 20 (24 @ 07:59) (18 - 20)  SpO2: 96% (24 @ 07:59) (93% - 98%)    2024 07:01  -  2024 07:00  --------------------------------------------------------  IN: 0 mL / OUT: 800 mL / NET: -800 mL       Daily Weight in k.5 (2024 00:00)    POCT Blood Glucose.: 97 mg/dL (2024 05:45)  POCT Blood Glucose.: 201 mg/dL (2024 20:48)  POCT Blood Glucose.: 132 mg/dL (2024 16:25)  POCT Blood Glucose.: 153 mg/dL (2024 15:22)  POCT Blood Glucose.: 169 mg/dL (2024 11:41)    PHYSICAL EXAM:    GENERAL:  NAD chronically ill appearing   SKIN: No rashes or lesions  HEENT: Atraumatic. Normocephalic.    NECK: Supple, No JVD.    PULMONARY: CTA B/L. No wheezing. No rales  CVS: Normal S1, S2. Rate and Rhythm are regular.   ABDOMEN/GI: Soft, Nontender, Nondistended   MSK:  No clubbing or cyanosis   NEUROLOGIC: arousable to sternal rub, does not follow commands   PSYCH: not awake or alert     Consultant(s) Notes Reviewed:  [x ] YES  [ ] NO  Care Discussed with Consultants/Other Providers [ x] YES  [ ] NO    LABS:                        8.3    9.62  )-----------( 408      ( 2024 05:57 )             25.9     139  |  108  |  34[H]  ----------------------------<  87  5.3[H]   |  23  |  0.9    Ca    9.4      2024 05:57  Phos  3.8     11-18  Mg     2.0     11-18    RADIOLOGY & ADDITIONAL TESTS:  Imaging or report Personally Reviewed:  [x] YES  [ ] NO  EKG reviewed: [x] YES  [ ] NO    Medications:  Standing  amantadine Syrup 50 milliGRAM(s) Oral <User Schedule>  amLODIPine   Tablet 10 milliGRAM(s) Oral daily  ascorbic acid 500 milliGRAM(s) Oral daily  bacitracin   Ointment 1 Application(s) Topical two times a day  bacitracin   Ointment 1 Application(s) Topical two times a day  chlorhexidine 2% Cloths 1 Application(s) Topical <User Schedule>  dextrose 5%. 1000 milliLiter(s) IV Continuous <Continuous>  dextrose 5%. 1000 milliLiter(s) IV Continuous <Continuous>  dextrose 50% Injectable 25 Gram(s) IV Push once  dextrose 50% Injectable 12.5 Gram(s) IV Push once  dextrose 50% Injectable 25 Gram(s) IV Push once  glucagon  Injectable 1 milliGRAM(s) IntraMuscular once  heparin   Injectable 5000 Unit(s) SubCutaneous every 12 hours  hydrALAZINE 25 milliGRAM(s) Oral every 8 hours  insulin lispro (ADMELOG) corrective regimen sliding scale   SubCutaneous every 8 hours  labetalol 200 milliGRAM(s) Oral every 8 hours  lacosamide IVPB 200 milliGRAM(s) IV Intermittent every 12 hours  levETIRAcetam   Injectable 1500 milliGRAM(s) IV Push every 12 hours  psyllium Powder 1 Packet(s) Oral every 8 hours  saccharomyces boulardii 250 milliGRAM(s) Oral two times a day  zinc sulfate 220 milliGRAM(s) Oral daily    PRN Meds  acetaminophen     Tablet .. 650 milliGRAM(s) Oral every 6 hours PRN  dextrose Oral Gel 15 Gram(s) Oral once PRN

## 2024-11-18 NOTE — PROGRESS NOTE ADULT - ASSESSMENT
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 While in NSICU, patient underwent MMA embolization and post operatively was stable, however, on 10/16 she was noted to have leaking from her crani site. STAT CTH showed increase in SDH and extracalvarial collection, NSG removed 25cc of blood and was re-sutured. Patient was then downgraded to the floor, was started on zosyn for UTI, and was discharged to short term rehab on 10/27. Patient returns to [facility] due to worsening lethargy, admitted to NCCU due to concern for seizures, now downgraded to SDU    Recently diagnosed SDH  Concern for Status epilepticus  Dysphagia s/p PEG on 11/12  Complete immobility due to SDH/Seizures   - s/p ketamine gtt in NCCU  - MR Brain 11/5 - without acuity; stable SDH  - serial CTH stable   - SBP goal: 110 -  <150  - s/p EEG + for seizure activity. Neurology informed, patient now on VEEG, AEDS adjusted to keppra 1500 mg BID and Lacosamide 200 q12.  - monitor AED levels and fu  neurology   - c/w amantadine 50mg q 12 daily and monitor for clinical improvement   - s/p PEG placement 11/12, so far tolerating feeds     UTI with low grade temps - resolved   - UCx + enterococcus. s/p ampicillin  - 11/12 overnight temp of 100.3-> repeat blood cx NGTD. no further fevers   - monitor diarrhea     HTN   - SBP goal: 110 -  <150  - - Echo  10/9/24: Normal global left ventricular systolic function. Left ventricular ejection fraction, by visual estimation, is 65 to 70%  - c/w lisinopril 20mg , labetalol 200mg q 8; c/w norvasc 10mg.      MARCO on CKD improving  mild hyperkalemia  - monitor BMP, c/w bladder scans q8H  - lokelma if K >5.5    MDS with Anemia, receives PRN PRBC  - s/p 1u PRBC transfusion 11/7 and 11/14   - monitor CBC< transfuse if Hb <4  - OP fu with Dr Wade     Overall prognosis is guarded  Pending: neurochecks per neurology, monitor fever curve/off abx, labs,cw  tube feeds, monitor diarrhea    Patient seen at bedside, total time spent to evaluate and treat the patient's acute illness and chronic medical conditions as well as time spent reviewing prior records, labs, radiology, documenting in electronic medical records,  discussing medical plan with   medical team was more than 40 minutes with >50% of time spent face to face with patient, discussing with patient/family as well as coordination of care

## 2024-11-18 NOTE — PROGRESS NOTE ADULT - ATTENDING COMMENTS
I edited the note
Patient seen and examined and agree with above except as noted.  Patients history, notes, labs, imaging, vitals and meds reviewed personally.    Plan as above
Recommendations as above
I edited the note
recommendations as above

## 2024-11-18 NOTE — CHART NOTE - NSCHARTNOTEFT_GEN_A_CORE
Registered Dietitian Follow-Up     Patient Profile Reviewed                           Yes [x]   No []    Pertinent Subjective Information:  RD spoke with RN- patient tolerating feeds. with soft and pasty bowel movements.      Pertinent Medical Interventions:  Recently diagnosed SDH  Concern for Status epilepticus  Dysphagia s/p PEG on   Complete immobility due to SDH/Seizures   s/p PEG placement  - tolerating feeds   MARCO on CKD - improving; mild hyperkalemia      Diet order:   Diet, NPO with Tube Feed:   Tube Feeding Modality: Gastrostomy  Glucerna 1.2 Ash (GLUCERNARTH)  Total Volume for 24 Hours (mL): 1080  Bolus  Total Volume of Bolus (mL):  360  Tube Feed Frequency: Every 8 hours   Tube Feed Start Time: 12:00  Bolus Feed Rate (mL per Hour): 500   Bolus Feed Duration (in Hours): 1  Bolus Feed Instructions:  360 ml at 7 am, 480 ml at 1 pm, 360 ml at 7 pm  check poc glucose and give any indicated insulin prior to each feeding  Free Water Flush Instructions:  flush water 50 ml before and 100 ml after each feeding  Banatrol TF     Qty per Day:  3 (11-15-24 @ 11:48) [Active]    Anthropometrics:  Height (cm): 162.6 (24 @ 08:42)  Weight (kg): 66.4 (24 @ 08:42)  BMI (kg/m2): 25.1 (24 @ 08:42)  IBW:     Daily Weight in k.5 (-18), Weight in k.1 (-17), Weight in k.8 (-16), Weight in k.2 (-15), Weight in k (-14), Weight in k.9 (11-13), Weight in k.6 (-12)  % Weight Change    MEDICATIONS  (STANDING):  amantadine Syrup 50 milliGRAM(s) Oral <User Schedule>  amLODIPine   Tablet 10 milliGRAM(s) Oral daily  ascorbic acid 500 milliGRAM(s) Oral daily  bacitracin   Ointment 1 Application(s) Topical two times a day  bacitracin   Ointment 1 Application(s) Topical two times a day  chlorhexidine 2% Cloths 1 Application(s) Topical <User Schedule>  dextrose 5%. 1000 milliLiter(s) (50 mL/Hr) IV Continuous <Continuous>  dextrose 5%. 1000 milliLiter(s) (100 mL/Hr) IV Continuous <Continuous>  dextrose 50% Injectable 25 Gram(s) IV Push once  dextrose 50% Injectable 12.5 Gram(s) IV Push once  dextrose 50% Injectable 25 Gram(s) IV Push once  glucagon  Injectable 1 milliGRAM(s) IntraMuscular once  heparin   Injectable 5000 Unit(s) SubCutaneous every 12 hours  hydrALAZINE 25 milliGRAM(s) Oral every 8 hours  insulin lispro (ADMELOG) corrective regimen sliding scale   SubCutaneous every 8 hours  labetalol 200 milliGRAM(s) Oral every 8 hours  lacosamide IVPB 200 milliGRAM(s) IV Intermittent every 12 hours  levETIRAcetam   Injectable 1500 milliGRAM(s) IV Push every 12 hours  psyllium Powder 1 Packet(s) Oral every 8 hours  saccharomyces boulardii 250 milliGRAM(s) Oral two times a day  zinc sulfate 220 milliGRAM(s) Oral daily    MEDICATIONS  (PRN):  acetaminophen     Tablet .. 650 milliGRAM(s) Oral every 6 hours PRN Temp greater or equal to 38C (100.4F)  dextrose Oral Gel 15 Gram(s) Oral once PRN Blood Glucose LESS THAN 70 milliGRAM(s)/deciliter    Pertinent Labs:  @ 05:57: Na 139, BUN 34[H], Cr 0.9, BG 87, K+ 5.3[H], Phos 3.8, Mg 2.0, Alk Phos --, ALT/SGPT --, AST/SGOT --, HbA1c --    Finger Sticks:  POCT Blood Glucose.: 188 mg/dL ( @ 13:57)  POCT Blood Glucose.: 147 mg/dL ( @ 11:36)  POCT Blood Glucose.: 97 mg/dL ( @ 05:45)  POCT Blood Glucose.: 201 mg/dL ( @ 20:48)  POCT Blood Glucose.: 132 mg/dL ( @ 16:25)  POCT Blood Glucose.: 153 mg/dL ( @ 15:22)    Physical Findings:  - Appearance: lethargic  - GI function: last BM    - Tubes: +PEG   - Oral/Mouth cavity: NPO with EN via PEG   - Skin: Per WOCN note  - stage 2 pressure injury to coccyx  - Edema: 1+ edema to right arm, left arm, right foot, left foot; 2+ generalized edema      Nutrition Requirements:  Weight Used: 66.4 kg dosing weight per initial RD assessment - with consideration for age, BMI, edema, acuity of illness, stage 2 pressure injury to coccyx     Estimated Energy Needs    Continue [x]  Adjust []  9720-9746 kcal/day (25-30 kcal/kg)     Estimated Protein Needs    Continue [x]  Adjust []   g/day (1.2-1.5 g/kg)     Estimated Fluid Needs        Continue [x]  Adjust []  3721-2264 mL/day (20-25 mL/kg)     Nutrient Intake:   Current EN regimen provides:  1080 mL total volume, 1296 kcal, 64.8 gm Protein, 875 mL free water from formula + 50 mL pre and 100 mL post feeds = 1475 mL total water      [x] Previous Nutrition Diagnosis:  Inadequate Protein Energy Intake             [x] Ongoing          [] Resolved    Nutrition Education: deferred     Nutrition Intervention:  EN recs, coordination of care     Goal/Expected Outcome:   EN to meet >75% of estimated needs within 5-7 days      Indicator/Monitoring:   EN tolerance, BM, GI s/s, weight, labs, skin status, NFPF      Recommendation:   1) Continue current EN regimen as it is well tolerated at this time     2) Monitor electrolytes, BG, renal profile - noted with slightly elevated K+ - continue to monitor    3) Monitor BM/GI s/s - noted to be using banatrol 3x/day   -consider discontinuing banatrol as BMs have been pasty recently    Patient is at moderate nutrition risk, RD to f/u   RD to remain available: Mercedes Forrester, VERONICA [phone] or via Teams

## 2024-11-18 NOTE — PROGRESS NOTE ADULT - ASSESSMENT
79 y.o female with h/o MDS, HTN, DM, recent R hemispheric SDH 10/8/2024 s/p crani, evac, and MMA embolization  pt readmitted 10/31 for lethargy, r/o status epilepticus.  + dysphagia - nasogastric tube placed for enteral feeding - now s/p GT placement  + diarrhea   + stage 2 gluteal/ buttock    plan:  - REE calculated by IJE (1431 k/d), and ASPEN recs (4457-4955 k/d)  - continue Glucena 1.2 -        360 ml over 45 min at 7a and 7 pm, and 480 ml at 1 pm  --> 1200 ml, 1425 kcal, 71 gm protein/d  - check poc glucose and give any indicated insulin before each feeding (not q6h or q8h)  - Banatrol TF up to 3 times a day prn watery stool   - considering Abx history, FloraStor added on 11/10 -  250 mg twice a day x 10 days

## 2024-11-18 NOTE — EEG REPORT - NS EEG TEXT BOX
Epilepsy Attending Note:     SIMONA KUHN    79y Female  MRN [id number]    Vital Signs Last 24 Hrs  T(C): 36.6 (18 Nov 2024 07:59), Max: 37.1 (17 Nov 2024 16:00)  T(F): 97.8 (18 Nov 2024 07:59), Max: 98.8 (17 Nov 2024 16:00)  HR: 105 (18 Nov 2024 07:59) (73 - 105)  BP: 103/59 (18 Nov 2024 07:59) (103/59 - 168/68)  BP(mean): 77 (18 Nov 2024 07:59) (73 - 98)  RR: 20 (18 Nov 2024 07:59) (18 - 20)  SpO2: 96% (18 Nov 2024 07:59) (93% - 100%)    Parameters below as of 18 Nov 2024 07:59  Patient On (Oxygen Delivery Method): nasal cannula                              8.3    9.62  )-----------( 408      ( 18 Nov 2024 05:57 )             25.9       11-18    139  |  108  |  34[H]  ----------------------------<  87  5.3[H]   |  23  |  0.9    Ca    9.4      18 Nov 2024 05:57  Phos  3.8     11-18  Mg     2.0     11-18        MEDICATIONS  (STANDING):  amantadine Syrup 50 milliGRAM(s) Oral <User Schedule>  amLODIPine   Tablet 10 milliGRAM(s) Oral daily  ascorbic acid 500 milliGRAM(s) Oral daily  bacitracin   Ointment 1 Application(s) Topical two times a day  bacitracin   Ointment 1 Application(s) Topical two times a day  chlorhexidine 2% Cloths 1 Application(s) Topical <User Schedule>  dextrose 5%. 1000 milliLiter(s) (100 mL/Hr) IV Continuous <Continuous>  dextrose 5%. 1000 milliLiter(s) (50 mL/Hr) IV Continuous <Continuous>  dextrose 50% Injectable 25 Gram(s) IV Push once  dextrose 50% Injectable 12.5 Gram(s) IV Push once  dextrose 50% Injectable 25 Gram(s) IV Push once  glucagon  Injectable 1 milliGRAM(s) IntraMuscular once  heparin   Injectable 5000 Unit(s) SubCutaneous every 12 hours  hydrALAZINE 25 milliGRAM(s) Oral every 8 hours  insulin lispro (ADMELOG) corrective regimen sliding scale   SubCutaneous every 8 hours  labetalol 200 milliGRAM(s) Oral every 8 hours  lacosamide IVPB 200 milliGRAM(s) IV Intermittent every 12 hours  levETIRAcetam   Injectable 1500 milliGRAM(s) IV Push every 12 hours  psyllium Powder 1 Packet(s) Oral every 8 hours  saccharomyces boulardii 250 milliGRAM(s) Oral two times a day  zinc sulfate 220 milliGRAM(s) Oral daily    MEDICATIONS  (PRN):  acetaminophen     Tablet .. 650 milliGRAM(s) Oral every 6 hours PRN Temp greater or equal to 38C (100.4F)  dextrose Oral Gel 15 Gram(s) Oral once PRN Blood Glucose LESS THAN 70 milliGRAM(s)/deciliter            VEEG in the last 24 hours:    Background-------continues, symmetrical, low ampliyude , less than optimally organized reaching frquencies in the range of 7-8 hz superimposed by low amplitude lower range theta      Focal and generalized slowing---mild to moderate generalized slowing    Interictal activity------------- none    Events---------- none    Seizures-----none    Impression: abnormal as above    Plan - as/ neurology team     Epilepsy Attending Note:     SIMONA KUHN    79y Female  MRN [id number]    Vital Signs Last 24 Hrs  T(C): 36.6 (2024 07:59), Max: 37.1 (2024 16:00)  T(F): 97.8 (2024 07:59), Max: 98.8 (2024 16:00)  HR: 105 (2024 07:59) (73 - 105)  BP: 103/59 (2024 07:59) (103/59 - 168/68)  BP(mean): 77 (2024 07:59) (73 - 98)  RR: 20 (2024 07:59) (18 - 20)  SpO2: 96% (:59) (93% - 100%)    Parameters below as of 2024 07:59  Patient On (Oxygen Delivery Method): nasal cannula                              8.3    9.62  )-----------( 408      ( 2024 05:57 )             25.9       11-18    139  |  108  |  34[H]  ----------------------------<  87  5.3[H]   |  23  |  0.9    Ca    9.4      2024 05:57  Phos  3.8       Mg     2.0     -18        MEDICATIONS  (STANDING):  amantadine Syrup 50 milliGRAM(s) Oral <User Schedule>  amLODIPine   Tablet 10 milliGRAM(s) Oral daily  ascorbic acid 500 milliGRAM(s) Oral daily  bacitracin   Ointment 1 Application(s) Topical two times a day  bacitracin   Ointment 1 Application(s) Topical two times a day  chlorhexidine 2% Cloths 1 Application(s) Topical <User Schedule>  dextrose 5%. 1000 milliLiter(s) (100 mL/Hr) IV Continuous <Continuous>  dextrose 5%. 1000 milliLiter(s) (50 mL/Hr) IV Continuous <Continuous>  dextrose 50% Injectable 25 Gram(s) IV Push once  dextrose 50% Injectable 12.5 Gram(s) IV Push once  dextrose 50% Injectable 25 Gram(s) IV Push once  glucagon  Injectable 1 milliGRAM(s) IntraMuscular once  heparin   Injectable 5000 Unit(s) SubCutaneous every 12 hours  hydrALAZINE 25 milliGRAM(s) Oral every 8 hours  insulin lispro (ADMELOG) corrective regimen sliding scale   SubCutaneous every 8 hours  labetalol 200 milliGRAM(s) Oral every 8 hours  lacosamide IVPB 200 milliGRAM(s) IV Intermittent every 12 hours  levETIRAcetam   Injectable 1500 milliGRAM(s) IV Push every 12 hours  psyllium Powder 1 Packet(s) Oral every 8 hours  saccharomyces boulardii 250 milliGRAM(s) Oral two times a day  zinc sulfate 220 milliGRAM(s) Oral daily    MEDICATIONS  (PRN):  acetaminophen     Tablet .. 650 milliGRAM(s) Oral every 6 hours PRN Temp greater or equal to 38C (100.4F)  dextrose Oral Gel 15 Gram(s) Oral once PRN Blood Glucose LESS THAN 70 milliGRAM(s)/deciliter            VEEG in the last 24 hours:    Background - continuous, symmetrical, less than optimally organized, reaching frequencies in the range of 6-7 hz superimposed by large amount of low amplitude lower range theta, shows reactivity    Focal and generalized slowin. mild to moderate generalized slowing  2. moderate to severe right hemispheric focal slowing, more prominently over FT region    Interictal activity - abundant right hemispheric, mainly posterior quadrant and more so T-O sharp waves and spikes, often present in quasi-periodic pattern and occasionally expressed more diffusely over both hemispheres    Events - none    Seizures - none    Impression: abnormal VEEG as above    Plan - per neurology team

## 2024-11-18 NOTE — CHART NOTE - NSCHARTNOTEFT_GEN_A_CORE
Transfer from CEU  Transfer to Med Floor    HPI:  79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 While in NSICU, patient underwent MMA embolization and post operatively was stable, however, on 10/16 she was noted to have leaking from her crani site. STAT CTH showed increase in SDH and extracalvarial collection, NSG removed 25cc of blood and was re-sutured. Patient was then downgraded to the floor, was started on zosyn for UTI, and was discharged to short term rehab on 10/27. Today, patient was more lethargic and presented to the ED for evaluation. CTH showing stable SDH, CTA negative for acute stroke. NSx and NSICU consulted for management.     NICU course:  •10/31: Returned from NH after being altered, CTH stable, concern for status epilepticus, given keppra, GCS improved, started on ABX for sepsis  •11/1: lowered keppra for CrCl, added vimpat. switched cefepime to zosyn, given 1 PRBC, nPO failed dysphagia screen overnight, IVF increased to 75 , needs restrain not to pull drain, can give zyprexa or fentanyl   •11/2: Ketamine gtt started for possible CSD  •11/3: K-hole increased to 1.5, labetalol PO started  •11/4: K-hole titration  •11/7: s/p 1 unit PRBC for hbg 6.5  •11/8: EEG negative, keppra level sent  CTH EEG showed stable findings. UCx + for enterococcus. s/p ampicillin. s/p PEG on 11/12    CEU course:  started feeds 11/13am. advance as tolerated. refeeding labs tm am. f/u keppra level.     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 While in NSICU, patient underwent MMA embolization and post operatively was stable, however, on 10/16 she was noted to have leaking from her crani site. STAT CTH showed increase in SDH and extracalvarial collection, NSG removed 25cc of blood and was re-sutured. Patient was then downgraded to the floor, was started on zosyn for UTI, and was discharged to short term rehab on 10/27. Patient returns to [facility] due to worsening lethargy, admitted to NCCU due to concern for seizures, now downgraded to SDU    Recently diagnosed SDH  Concern for Status epilepticus  Dysphagia s/p PEG on 11/12  Complete immobility due to SDH/Seizures   - s/p ketamine gtt in NCCU  - MR Brain 11/5 - without acuity; stable SDH  - serial CTH stable   - SBP goal: 110 -  <150  - s/p EEG + for seizure activity. Neurology informed, patient now on VEEG, AEDS adjusted to keppra 1500 mg BID and Lacosamide 200 q12.  - monitor AED levels and fu  neurology   - c/w amantadine 50mg q 12 daily and monitor for clinical improvement   - s/p PEG placement 11/12, so far tolerating feeds     UTI with low grade temps - resolved   - UCx + enterococcus. s/p ampicillin  - 11/12 overnight temp of 100.3-> repeat blood cx NGTD. no further fevers   - monitor diarrhea     HTN   - SBP goal: 110 -  <150  - - Echo  10/9/24: Normal global left ventricular systolic function. Left ventricular ejection fraction, by visual estimation, is 65 to 70%  - c/w lisinopril 20mg , labetalol 200mg q 8; c/w norvasc 10mg.      MARCO on CKD improving  mild hyperkalemia  - monitor BMP, c/w bladder scans q8H  - lokelma if K >5.5    MDS with Anemia, receives PRN PRBC  - s/p 1u PRBC transfusion 11/7 and 11/14   - monitor CBC< transfuse if Hb <4  - OP fu with Dr Wade     Overall prognosis is guarded  Pending: neurochecks per neurology, monitor fever curve/off abx, labs,cw  tube feeds, monitor diarrhea Transfer from CEU  Transfer to Med Floor    HPI:  79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 While in NSICU, patient underwent MMA embolization and post operatively was stable, however, on 10/16 she was noted to have leaking from her crani site. STAT CTH showed increase in SDH and extracalvarial collection, NSG removed 25cc of blood and was re-sutured. Patient was then downgraded to the floor, was started on zosyn for UTI, and was discharged to short term rehab on 10/27. Today, patient was more lethargic and presented to the ED for evaluation. CTH showing stable SDH, CTA negative for acute stroke. NSx and NSICU consulted for management.     NICU course:  •10/31: Returned from NH after being altered, CTH stable, concern for status epilepticus, given keppra, GCS improved, started on ABX for sepsis  •11/1: lowered keppra for CrCl, added vimpat. switched cefepime to zosyn, given 1 PRBC, nPO failed dysphagia screen overnight, IVF increased to 75 , needs restrain not to pull drain, can give zyprexa or fentanyl   •11/2: Ketamine gtt started for possible CSD  •11/3: K-hole increased to 1.5, labetalol PO started  •11/4: K-hole titration  •11/7: s/p 1 unit PRBC for hbg 6.5  •11/8: EEG negative, keppra level sent  CTH EEG showed stable findings. UCx + for enterococcus. s/p ampicillin. s/p PEG on 11/12    CEU course:  started feeds 11/13am. advance as tolerated. refeeding labs tm am. f/u keppra level. As per Neuro:  d/c vEEG, continue vimpat 200 mg BID, continue keppra 1500 mg BIDAtivan 2mg IV for GTCs > 2 min only. Routine neurochecks - Seizure precautions Maintain Mg> 2 mmol/l. Seizure free for the last 3 days, exam unchanged. Mental status likely multifactorial in nature. Presumed poor cognitive reserve given original brain insult and complications, complicate by recent epileptic events. EEG shows generalized and focal slowing    79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 While in NSICU, patient underwent MMA embolization and post operatively was stable, however, on 10/16 she was noted to have leaking from her crani site. STAT CTH showed increase in SDH and extracalvarial collection, NSG removed 25cc of blood and was re-sutured. Patient was then downgraded to the floor, was started on zosyn for UTI, and was discharged to short term rehab on 10/27. Patient returns to [facility] due to worsening lethargy, admitted to NCCU due to concern for seizures, now downgraded to SDU    Recently diagnosed SDH  Concern for Status epilepticus  Dysphagia s/p PEG on 11/12  Complete immobility due to SDH/Seizures   - s/p ketamine gtt in NCCU  - MR Brain 11/5 - without acuity; stable SDH  - serial CTH stable   - SBP goal: 110 -  <150  - s/p EEG + for seizure activity. Neurology informed, patient now on VEEG, AEDS adjusted to keppra 1500 mg BID and Lacosamide 200 q12.  - monitor AED levels and fu  neurology   - c/w amantadine 50mg q 12 daily and monitor for clinical improvement   - s/p PEG placement 11/12, so far tolerating feeds     UTI with low grade temps - resolved   - UCx + enterococcus. s/p ampicillin  - 11/12 overnight temp of 100.3-> repeat blood cx NGTD. no further fevers   - monitor diarrhea     HTN   - SBP goal: 110 -  <150  - - Echo  10/9/24: Normal global left ventricular systolic function. Left ventricular ejection fraction, by visual estimation, is 65 to 70%  - c/w lisinopril 20mg , labetalol 200mg q 8; c/w norvasc 10mg.      MARCO on CKD improving  mild hyperkalemia  - monitor BMP, c/w bladder scans q8H  - lokelma if K >5.5    MDS with Anemia, receives PRN PRBC  - s/p 1u PRBC transfusion 11/7 and 11/14   - monitor CBC< transfuse if Hb <4  - OP fu with Dr Wade     Overall prognosis is guarded  Pending: neurochecks per neurology, monitor fever curve/off abx, labs,cw  tube feeds, monitor diarrhea

## 2024-11-18 NOTE — PROGRESS NOTE ADULT - ASSESSMENT
79F w/ PMHx of Myelodysplastic syndrome, DM, HTN, CKD, R hemispheric SDH s/p evacuation now & MMA embo with worsening extracalvarial bleed and had to go back to the OR. Admitted to NICU post op r/o status epilpeticus s/p ketamine drip now weaned off controlled on keppra and vimpat. Seizure free for the last 3 days, exam unchanged. Mental status likely multifactorial in nature. Presumed poor cognitive reserve given original brain insult and complications, complicate by recent epileptic events.     RECS:  - d/c vEEG  - continue vimpat 200 mg BID  - continue keppra 1500 mg BID  - Ativan 2mg IV for GTCs > 2 min only  - Routine neurochecks  - Seizure precautions  - Maintain Mg> 2 mmol/l    Discussed with Dr. Portillo

## 2024-11-18 NOTE — PROGRESS NOTE ADULT - SUBJECTIVE AND OBJECTIVE BOX
NEUROLOGY PROGRESS NOTE     HPI / INTERVAL EVENTS  79F w/ PMHx of Myelodysplastic syndrome, DM, HTN, CKD, R hemispheric SDH 2.2cm w/ mass effect and 1.1cm MLS , went to OR for evacuation now s/p MMA embo with worsening extracalvarial bleed and had to go back to the OR. Patient then developed persistent AMS and was admitted to NICU to r/o status epilepticus s/p ketamine drip now weaned off controlled on keppra and vimpat. VEEG with no epileptiform activity in neuro ICU. Patient now downgraded to CEU, currently on vEEG, reports with no clinical seizure activity noted but increased vimpat with seizure resolution but still abundant interictal activity. RR on 11/15 for HR on the 200s with negative EKG but still no seizure activity on VEEG at the time. No ovn events per primary teams.     No seizures or epileptiform activity on EEG report today (11/18), mild to moderate slowing. No acute events overnight. No new seizures since 11/15.          MEDICATIONS  Home Medications:  amLODIPine 10 mg oral tablet: 1 tab(s) orally once a day (31 Oct 2024 16:24)  glimepiride 2 mg oral tablet: 1 tab(s) orally 2 times a day (31 Oct 2024 16:24)  Jardiance 10 mg oral tablet: 1 tab(s) orally once a day (31 Oct 2024 16:24)  labetalol 200 mg oral tablet: 1 tab(s) orally every 8 hours (31 Oct 2024 16:24)  levETIRAcetam 500 mg oral tablet: 1 tab(s) orally 2 times a day (31 Oct 2024 16:24)  lisinopril 10 mg oral tablet: 1 tab(s) orally once a day (31 Oct 2024 16:24)  pantoprazole 40 mg oral delayed release tablet: 1 tab(s) orally once a day (before a meal) (31 Oct 2024 16:24)  pioglitazone 30 mg oral tablet: 1 tab(s) orally once a day (31 Oct 2024 16:24)  polyethylene glycol 3350 oral powder for reconstitution: 17 gram(s) orally once a day (31 Oct 2024 16:24)  pyridoxine 100 mg oral tablet: 1 tab(s) orally once a day (31 Oct 2024 16:24)  senna leaf extract oral tablet: 2 tab(s) orally once a day (at bedtime) (31 Oct 2024 16:24)  SPS 15 GM/60 ML SUSPENSION: TAKE 15ML TWICE WEEKLY (31 Oct 2024 16:24)  Tradjenta 5 mg oral tablet: 1 tab(s) orally once a day (31 Oct 2024 16:24)    MEDICATIONS  (STANDING):  amantadine Syrup 50 milliGRAM(s) Oral <User Schedule>  amLODIPine   Tablet 10 milliGRAM(s) Oral daily  ascorbic acid 500 milliGRAM(s) Oral daily  bacitracin   Ointment 1 Application(s) Topical two times a day  bacitracin   Ointment 1 Application(s) Topical two times a day  chlorhexidine 2% Cloths 1 Application(s) Topical <User Schedule>  dextrose 5%. 1000 milliLiter(s) (50 mL/Hr) IV Continuous <Continuous>  dextrose 5%. 1000 milliLiter(s) (100 mL/Hr) IV Continuous <Continuous>  dextrose 50% Injectable 25 Gram(s) IV Push once  dextrose 50% Injectable 12.5 Gram(s) IV Push once  dextrose 50% Injectable 25 Gram(s) IV Push once  glucagon  Injectable 1 milliGRAM(s) IntraMuscular once  heparin   Injectable 5000 Unit(s) SubCutaneous every 12 hours  hydrALAZINE 25 milliGRAM(s) Oral every 8 hours  insulin lispro (ADMELOG) corrective regimen sliding scale   SubCutaneous every 8 hours  labetalol 200 milliGRAM(s) Oral every 8 hours  lacosamide IVPB 200 milliGRAM(s) IV Intermittent every 12 hours  levETIRAcetam   Injectable 1500 milliGRAM(s) IV Push every 12 hours  psyllium Powder 1 Packet(s) Oral every 8 hours  saccharomyces boulardii 250 milliGRAM(s) Oral two times a day  zinc sulfate 220 milliGRAM(s) Oral daily    MEDICATIONS  (PRN):  acetaminophen     Tablet .. 650 milliGRAM(s) Oral every 6 hours PRN Temp greater or equal to 38C (100.4F)  dextrose Oral Gel 15 Gram(s) Oral once PRN Blood Glucose LESS THAN 70 milliGRAM(s)/deciliter      FAMILY HISTORY:    SOCIAL HISTORY: negative for tobacco, alcohol, or ilicit drug use.    Allergies    No Known Allergies    Intolerances        GEN: NAD, pleasant, cooperative    Neurological Exam:   - General: lying comfortably in the bed   -Mental status: Lethargic but opens eyes with stimulation. Not following commands.   -Cranial nerves:  II: Blink-to-threat: Inconsistent  II, III: Pupillary responses: equal and reactive to light  III, IV, VI: primary gaze midline  VII: Face is symmetric  V,VII: positive Corneal reflex, + grimace response  Sensory: patient will moan to noxious stimuli in all extremities and withdraws b/l UE to pain  Motor: Spontaneous movement in UE (R>L), no myoclonus. Increased tone bilaterally.   Reflexes: 2/4 DTR, mute Plantar response, no posturing reflexes, no clonus  Gait: deferred  LABS:                        8.3    9.62  )-----------( 408      ( 18 Nov 2024 05:57 )             25.9     11-18    139  |  108  |  34[H]  ----------------------------<  87  5.3[H]   |  23  |  0.9    Ca    9.4      18 Nov 2024 05:57  Phos  3.8     11-18  Mg     2.0     11-18      Hemoglobin A1C:   Vitamin B12     CAPILLARY BLOOD GLUCOSE      POCT Blood Glucose.: 147 mg/dL (18 Nov 2024 11:36)      Urinalysis Basic - ( 18 Nov 2024 05:57 )    Color: x / Appearance: x / SG: x / pH: x  Gluc: 87 mg/dL / Ketone: x  / Bili: x / Urobili: x   Blood: x / Protein: x / Nitrite: x   Leuk Esterase: x / RBC: x / WBC x   Sq Epi: x / Non Sq Epi: x / Bacteria: x            Microbiology:      RADIOLOGY, EKG AND ADDITIONAL TESTS: Reviewed.

## 2024-11-19 LAB
ALBUMIN SERPL ELPH-MCNC: 3.2 G/DL — LOW (ref 3.5–5.2)
ALP SERPL-CCNC: 118 U/L — HIGH (ref 30–115)
ALT FLD-CCNC: 20 U/L — SIGNIFICANT CHANGE UP (ref 0–41)
ANION GAP SERPL CALC-SCNC: 10 MMOL/L — SIGNIFICANT CHANGE UP (ref 7–14)
AST SERPL-CCNC: 20 U/L — SIGNIFICANT CHANGE UP (ref 0–41)
BASOPHILS # BLD AUTO: 0.11 K/UL — SIGNIFICANT CHANGE UP (ref 0–0.2)
BASOPHILS NFR BLD AUTO: 1 % — SIGNIFICANT CHANGE UP (ref 0–1)
BILIRUB SERPL-MCNC: 0.4 MG/DL — SIGNIFICANT CHANGE UP (ref 0.2–1.2)
BUN SERPL-MCNC: 34 MG/DL — HIGH (ref 10–20)
CALCIUM SERPL-MCNC: 8.8 MG/DL — SIGNIFICANT CHANGE UP (ref 8.4–10.4)
CHLORIDE SERPL-SCNC: 108 MMOL/L — SIGNIFICANT CHANGE UP (ref 98–110)
CO2 SERPL-SCNC: 20 MMOL/L — SIGNIFICANT CHANGE UP (ref 17–32)
CREAT SERPL-MCNC: 0.9 MG/DL — SIGNIFICANT CHANGE UP (ref 0.7–1.5)
EGFR: 65 ML/MIN/1.73M2 — SIGNIFICANT CHANGE UP
EOSINOPHIL # BLD AUTO: 0.37 K/UL — SIGNIFICANT CHANGE UP (ref 0–0.7)
EOSINOPHIL NFR BLD AUTO: 3.2 % — SIGNIFICANT CHANGE UP (ref 0–8)
GLUCOSE BLDC GLUCOMTR-MCNC: 131 MG/DL — HIGH (ref 70–99)
GLUCOSE BLDC GLUCOMTR-MCNC: 163 MG/DL — HIGH (ref 70–99)
GLUCOSE BLDC GLUCOMTR-MCNC: 233 MG/DL — HIGH (ref 70–99)
GLUCOSE SERPL-MCNC: 123 MG/DL — HIGH (ref 70–99)
HCT VFR BLD CALC: 23.8 % — LOW (ref 37–47)
HGB BLD-MCNC: 7.7 G/DL — LOW (ref 12–16)
IMM GRANULOCYTES NFR BLD AUTO: 0.6 % — HIGH (ref 0.1–0.3)
LYMPHOCYTES # BLD AUTO: 1.45 K/UL — SIGNIFICANT CHANGE UP (ref 1.2–3.4)
LYMPHOCYTES # BLD AUTO: 12.6 % — LOW (ref 20.5–51.1)
MAGNESIUM SERPL-MCNC: 1.9 MG/DL — SIGNIFICANT CHANGE UP (ref 1.8–2.4)
MCHC RBC-ENTMCNC: 30 PG — SIGNIFICANT CHANGE UP (ref 27–31)
MCHC RBC-ENTMCNC: 32.4 G/DL — SIGNIFICANT CHANGE UP (ref 32–37)
MCV RBC AUTO: 92.6 FL — SIGNIFICANT CHANGE UP (ref 81–99)
MONOCYTES # BLD AUTO: 1.2 K/UL — HIGH (ref 0.1–0.6)
MONOCYTES NFR BLD AUTO: 10.4 % — HIGH (ref 1.7–9.3)
NEUTROPHILS # BLD AUTO: 8.33 K/UL — HIGH (ref 1.4–6.5)
NEUTROPHILS NFR BLD AUTO: 72.2 % — SIGNIFICANT CHANGE UP (ref 42.2–75.2)
NRBC # BLD: 0 /100 WBCS — SIGNIFICANT CHANGE UP (ref 0–0)
PHOSPHATE SERPL-MCNC: 4.3 MG/DL — SIGNIFICANT CHANGE UP (ref 2.1–4.9)
PLATELET # BLD AUTO: 401 K/UL — HIGH (ref 130–400)
PMV BLD: 9.6 FL — SIGNIFICANT CHANGE UP (ref 7.4–10.4)
POTASSIUM SERPL-MCNC: 5.5 MMOL/L — HIGH (ref 3.5–5)
POTASSIUM SERPL-SCNC: 5.5 MMOL/L — HIGH (ref 3.5–5)
PROT SERPL-MCNC: 4.9 G/DL — LOW (ref 6–8)
RBC # BLD: 2.57 M/UL — LOW (ref 4.2–5.4)
RBC # FLD: 14.6 % — HIGH (ref 11.5–14.5)
SODIUM SERPL-SCNC: 138 MMOL/L — SIGNIFICANT CHANGE UP (ref 135–146)
WBC # BLD: 11.53 K/UL — HIGH (ref 4.8–10.8)
WBC # FLD AUTO: 11.53 K/UL — HIGH (ref 4.8–10.8)

## 2024-11-19 PROCEDURE — 95720 EEG PHY/QHP EA INCR W/VEEG: CPT

## 2024-11-19 PROCEDURE — 99232 SBSQ HOSP IP/OBS MODERATE 35: CPT

## 2024-11-19 RX ORDER — SODIUM ZIRCONIUM CYCLOSILICATE 5 G/5G
10 POWDER, FOR SUSPENSION ORAL ONCE
Refills: 0 | Status: COMPLETED | OUTPATIENT
Start: 2024-11-19 | End: 2024-11-19

## 2024-11-19 RX ADMIN — LABETALOL 200 MILLIGRAM(S): 100 TABLET, FILM COATED ORAL at 05:47

## 2024-11-19 RX ADMIN — AMLODIPINE BESYLATE 10 MILLIGRAM(S): 10 TABLET ORAL at 05:47

## 2024-11-19 RX ADMIN — LABETALOL 200 MILLIGRAM(S): 100 TABLET, FILM COATED ORAL at 13:14

## 2024-11-19 RX ADMIN — Medication 220 MILLIGRAM(S): at 11:44

## 2024-11-19 RX ADMIN — HYDRALAZINE HYDROCHLORIDE 25 MILLIGRAM(S): 10 TABLET ORAL at 05:47

## 2024-11-19 RX ADMIN — Medication 500 MILLIGRAM(S): at 11:44

## 2024-11-19 RX ADMIN — CHLORHEXIDINE GLUCONATE 1 APPLICATION(S): 1.2 RINSE ORAL at 05:49

## 2024-11-19 RX ADMIN — Medication 250 MILLIGRAM(S): at 05:49

## 2024-11-19 RX ADMIN — Medication 1 PACKET(S): at 05:48

## 2024-11-19 RX ADMIN — Medication 250 MILLIGRAM(S): at 17:30

## 2024-11-19 RX ADMIN — Medication 5000 UNIT(S): at 17:28

## 2024-11-19 RX ADMIN — Medication 50 MILLIGRAM(S): at 05:48

## 2024-11-19 RX ADMIN — LACOSAMIDE 140 MILLIGRAM(S): 10 INJECTION INTRAVENOUS at 05:47

## 2024-11-19 RX ADMIN — Medication 1 APPLICATION(S): at 17:28

## 2024-11-19 RX ADMIN — LACOSAMIDE 140 MILLIGRAM(S): 10 INJECTION INTRAVENOUS at 18:07

## 2024-11-19 RX ADMIN — Medication 2: at 13:16

## 2024-11-19 RX ADMIN — HYDRALAZINE HYDROCHLORIDE 25 MILLIGRAM(S): 10 TABLET ORAL at 21:54

## 2024-11-19 RX ADMIN — LEVETIRACETAM 1500 MILLIGRAM(S): 1000 TABLET ORAL at 17:30

## 2024-11-19 RX ADMIN — Medication 1 PACKET(S): at 13:14

## 2024-11-19 RX ADMIN — Medication 50 MILLIGRAM(S): at 13:14

## 2024-11-19 RX ADMIN — SODIUM ZIRCONIUM CYCLOSILICATE 10 GRAM(S): 5 POWDER, FOR SUSPENSION ORAL at 09:55

## 2024-11-19 RX ADMIN — Medication 5000 UNIT(S): at 05:49

## 2024-11-19 RX ADMIN — LEVETIRACETAM 1500 MILLIGRAM(S): 1000 TABLET ORAL at 05:44

## 2024-11-19 RX ADMIN — LABETALOL 200 MILLIGRAM(S): 100 TABLET, FILM COATED ORAL at 21:54

## 2024-11-19 RX ADMIN — Medication 1 APPLICATION(S): at 05:49

## 2024-11-19 RX ADMIN — HYDRALAZINE HYDROCHLORIDE 25 MILLIGRAM(S): 10 TABLET ORAL at 13:14

## 2024-11-19 NOTE — EEG REPORT - THIS IS A
Continuous Video EEG

## 2024-11-19 NOTE — PROGRESS NOTE ADULT - SUBJECTIVE AND OBJECTIVE BOX
SIMONA KUHN  79y Female    CHIEF COMPLAINT:    Patient is a 79y old  Female who presents with a chief complaint of SDH, Seizures (17 Nov 2024 09:07)    Overnight     Patient seen and examined. No acute events overnight. on room air, on veeg     ROS: All other systems are negative.    Vital Signs:    T(C): 36.4 (19 Nov 2024 12:30), Max: 37.1 (19 Nov 2024 04:58)  T(F): 97.5 (19 Nov 2024 12:30), Max: 98.8 (19 Nov 2024 04:58)  HR: 81 (19 Nov 2024 12:30) (79 - 82)  BP: 136/61 (19 Nov 2024 12:30) (127/66 - 139/72)  RR: 18 (19 Nov 2024 12:30) (17 - 18)  SpO2: 97% (19 Nov 2024 04:58) (96% - 97%)    Parameters below as of 19 Nov 2024 04:58  Patient On (Oxygen Delivery Method): room air    CAPILLARY BLOOD GLUCOSE  POCT Blood Glucose.: 163 mg/dL (19 Nov 2024 13:09)  POCT Blood Glucose.: 131 mg/dL (19 Nov 2024 05:40)  POCT Blood Glucose.: 114 mg/dL (18 Nov 2024 22:54)      PHYSICAL EXAM:    GENERAL:  NAD chronically ill appearing   SKIN: No rashes or lesions  HEENT: Atraumatic. Normocephalic.    NECK: Supple, No JVD.    PULMONARY: CTA B/L. No wheezing. No rales  CVS: Normal S1, S2. Rate and Rhythm are regular.   ABDOMEN/GI: Soft, Nontender, Nondistended   MSK:  No clubbing or cyanosis   NEUROLOGIC: arousable to sternal rub, does not follow commands   PSYCH: not awake or alert     Consultant(s) Notes Reviewed:  [x ] YES      Care Discussed with Consultants/Other Providers [ x] YES    LABS:                          7.7    11.53 )-----------( 401      ( 19 Nov 2024 06:36 )             23.8     11-19    138  |  108  |  34  ----------------------<  123  5.5   |  20  |  0.9    Ca    8.8      19 Nov 2024 06:36  Phos  4.3     11-19  Mg     1.9     11-19    TPro  4.9[L]  /  Alb  3.2[L]  /  TBili  0.4  /  DBili  x   /  AST  20  /  ALT  20  /  AlkPhos  118[H]  11-19                        8.3    9.62  )-----------( 408      ( 18 Nov 2024 05:57 )             25.9     139  |  108  |  34[H]  ----------------------------<  87  5.3[H]   |  23  |  0.9    Ca    9.4      18 Nov 2024 05:57  Phos  3.8     11-18  Mg     2.0     11-18    RADIOLOGY & ADDITIONAL TESTS:    Imaging or report Personally Reviewed:  [x] YES    EKG reviewed: [x] YES      Medications:  amantadine Syrup 50 milliGRAM(s) Oral <User Schedule>  amLODIPine   Tablet 10 milliGRAM(s) Oral daily  ascorbic acid 500 milliGRAM(s) Oral daily  bacitracin   Ointment 1 Application(s) Topical two times a day  bacitracin   Ointment 1 Application(s) Topical two times a day  chlorhexidine 2% Cloths 1 Application(s) Topical <User Schedule>  dextrose 5%. 1000 milliLiter(s) IV Continuous <Continuous>  dextrose 5%. 1000 milliLiter(s) IV Continuous <Continuous>  dextrose 50% Injectable 25 Gram(s) IV Push once  dextrose 50% Injectable 12.5 Gram(s) IV Push once  dextrose 50% Injectable 25 Gram(s) IV Push once  glucagon  Injectable 1 milliGRAM(s) IntraMuscular once  heparin   Injectable 5000 Unit(s) SubCutaneous every 12 hours  hydrALAZINE 25 milliGRAM(s) Oral every 8 hours  insulin lispro (ADMELOG) corrective regimen sliding scale   SubCutaneous every 8 hours  labetalol 200 milliGRAM(s) Oral every 8 hours  lacosamide IVPB 200 milliGRAM(s) IV Intermittent every 12 hours  levETIRAcetam   Injectable 1500 milliGRAM(s) IV Push every 12 hours  psyllium Powder 1 Packet(s) Oral every 8 hours  saccharomyces boulardii 250 milliGRAM(s) Oral two times a day  zinc sulfate 220 milliGRAM(s) Oral daily    PRN Meds  acetaminophen     Tablet .. 650 milliGRAM(s) Oral every 6 hours PRN  dextrose Oral Gel 15 Gram(s) Oral once PRN

## 2024-11-19 NOTE — EEG REPORT - NS EEG TEXT BOX
Epilepsy Attending Note:     SIMONA KUHN    79y Female  MRN [id number]    Vital Signs Last 24 Hrs  T(C): 37.1 (2024 04:58), Max: 37.1 (2024 04:58)  T(F): 98.8 (2024 04:58), Max: 98.8 (2024 04:58)  HR: 80 (2024 04:58) (77 - 82)  BP: 139/72 (2024 04:58) (127/66 - 147/68)  BP(mean): 98 (2024 14:00) (92 - 98)  RR: 18 (2024 04:58) (17 - 20)  SpO2: 97% (2024 04:58) (93% - 98%)    Parameters below as of 2024 04:58  Patient On (Oxygen Delivery Method): room air                              7.7    11.53 )-----------( 401      ( 2024 06:36 )             23.8           138  |  108  |  34[H]  ----------------------------<  123[H]  5.5[H]   |  20  |  0.9    Ca    8.8      2024 06:36  Phos  4.3       Mg     1.9         TPro  4.9[L]  /  Alb  3.2[L]  /  TBili  0.4  /  DBili  x   /  AST  20  /  ALT  20  /  AlkPhos  118[H]        MEDICATIONS  (STANDING):  amantadine Syrup 50 milliGRAM(s) Oral <User Schedule>  amLODIPine   Tablet 10 milliGRAM(s) Oral daily  ascorbic acid 500 milliGRAM(s) Oral daily  bacitracin   Ointment 1 Application(s) Topical two times a day  bacitracin   Ointment 1 Application(s) Topical two times a day  chlorhexidine 2% Cloths 1 Application(s) Topical <User Schedule>  dextrose 5%. 1000 milliLiter(s) (100 mL/Hr) IV Continuous <Continuous>  dextrose 5%. 1000 milliLiter(s) (50 mL/Hr) IV Continuous <Continuous>  dextrose 50% Injectable 25 Gram(s) IV Push once  dextrose 50% Injectable 12.5 Gram(s) IV Push once  dextrose 50% Injectable 25 Gram(s) IV Push once  glucagon  Injectable 1 milliGRAM(s) IntraMuscular once  heparin   Injectable 5000 Unit(s) SubCutaneous every 12 hours  hydrALAZINE 25 milliGRAM(s) Oral every 8 hours  insulin lispro (ADMELOG) corrective regimen sliding scale   SubCutaneous every 8 hours  labetalol 200 milliGRAM(s) Oral every 8 hours  lacosamide IVPB 200 milliGRAM(s) IV Intermittent every 12 hours  levETIRAcetam   Injectable 1500 milliGRAM(s) IV Push every 12 hours  psyllium Powder 1 Packet(s) Oral every 8 hours  saccharomyces boulardii 250 milliGRAM(s) Oral two times a day  zinc sulfate 220 milliGRAM(s) Oral daily    MEDICATIONS  (PRN):  acetaminophen     Tablet .. 650 milliGRAM(s) Oral every 6 hours PRN Temp greater or equal to 38C (100.4F)  dextrose Oral Gel 15 Gram(s) Oral once PRN Blood Glucose LESS THAN 70 milliGRAM(s)/deciliter            VEEG in the last 24 hours:    Background - continuous, symmetrical, less than optimally organized, reaching frequencies in the range of 6-7 hz superimposed by large amount of low amplitude lower range theta, shows reactivity    Focal and generalized slowin. mild to moderate generalized slowing  2. moderate to severe right hemispheric focal slowing, more prominently over FT region    Interictal activity - abundant right hemispheric, mainly posterior quadrant and more so T-O sharp waves and spikes, often present in quasi-periodic pattern.    Events - none    Seizures - none    Impression: abnormal VEEG as above    Plan - per neurology team

## 2024-11-20 ENCOUNTER — TRANSCRIPTION ENCOUNTER (OUTPATIENT)
Age: 79
End: 2024-11-20

## 2024-11-20 ENCOUNTER — NON-APPOINTMENT (OUTPATIENT)
Age: 79
End: 2024-11-20

## 2024-11-20 VITALS
SYSTOLIC BLOOD PRESSURE: 153 MMHG | OXYGEN SATURATION: 95 % | RESPIRATION RATE: 16 BRPM | HEART RATE: 86 BPM | TEMPERATURE: 99 F | DIASTOLIC BLOOD PRESSURE: 55 MMHG

## 2024-11-20 LAB
ALBUMIN SERPL ELPH-MCNC: 3.3 G/DL — LOW (ref 3.5–5.2)
ALP SERPL-CCNC: 112 U/L — SIGNIFICANT CHANGE UP (ref 30–115)
ALT FLD-CCNC: 22 U/L — SIGNIFICANT CHANGE UP (ref 0–41)
ANION GAP SERPL CALC-SCNC: 11 MMOL/L — SIGNIFICANT CHANGE UP (ref 7–14)
AST SERPL-CCNC: 18 U/L — SIGNIFICANT CHANGE UP (ref 0–41)
BASOPHILS # BLD AUTO: 0.09 K/UL — SIGNIFICANT CHANGE UP (ref 0–0.2)
BASOPHILS NFR BLD AUTO: 0.9 % — SIGNIFICANT CHANGE UP (ref 0–1)
BILIRUB SERPL-MCNC: 0.3 MG/DL — SIGNIFICANT CHANGE UP (ref 0.2–1.2)
BUN SERPL-MCNC: 38 MG/DL — HIGH (ref 10–20)
CALCIUM SERPL-MCNC: 9.2 MG/DL — SIGNIFICANT CHANGE UP (ref 8.4–10.5)
CHLORIDE SERPL-SCNC: 108 MMOL/L — SIGNIFICANT CHANGE UP (ref 98–110)
CO2 SERPL-SCNC: 19 MMOL/L — SIGNIFICANT CHANGE UP (ref 17–32)
CREAT SERPL-MCNC: 0.9 MG/DL — SIGNIFICANT CHANGE UP (ref 0.7–1.5)
EGFR: 65 ML/MIN/1.73M2 — SIGNIFICANT CHANGE UP
EOSINOPHIL # BLD AUTO: 0.35 K/UL — SIGNIFICANT CHANGE UP (ref 0–0.7)
EOSINOPHIL NFR BLD AUTO: 3.5 % — SIGNIFICANT CHANGE UP (ref 0–8)
GLUCOSE BLDC GLUCOMTR-MCNC: 169 MG/DL — HIGH (ref 70–99)
GLUCOSE BLDC GLUCOMTR-MCNC: 274 MG/DL — HIGH (ref 70–99)
GLUCOSE SERPL-MCNC: 171 MG/DL — HIGH (ref 70–99)
HCT VFR BLD CALC: 23.5 % — LOW (ref 37–47)
HGB BLD-MCNC: 7.5 G/DL — LOW (ref 12–16)
IMM GRANULOCYTES NFR BLD AUTO: 0.9 % — HIGH (ref 0.1–0.3)
LEVETIRACETAM SERPL-MCNC: 127.2 UG/ML — HIGH (ref 10–40)
LYMPHOCYTES # BLD AUTO: 1.34 K/UL — SIGNIFICANT CHANGE UP (ref 1.2–3.4)
LYMPHOCYTES # BLD AUTO: 13.4 % — LOW (ref 20.5–51.1)
MAGNESIUM SERPL-MCNC: 1.9 MG/DL — SIGNIFICANT CHANGE UP (ref 1.8–2.4)
MCHC RBC-ENTMCNC: 29.8 PG — SIGNIFICANT CHANGE UP (ref 27–31)
MCHC RBC-ENTMCNC: 31.9 G/DL — LOW (ref 32–37)
MCV RBC AUTO: 93.3 FL — SIGNIFICANT CHANGE UP (ref 81–99)
MONOCYTES # BLD AUTO: 1.2 K/UL — HIGH (ref 0.1–0.6)
MONOCYTES NFR BLD AUTO: 12 % — HIGH (ref 1.7–9.3)
NEUTROPHILS # BLD AUTO: 6.91 K/UL — HIGH (ref 1.4–6.5)
NEUTROPHILS NFR BLD AUTO: 69.3 % — SIGNIFICANT CHANGE UP (ref 42.2–75.2)
NRBC # BLD: 0 /100 WBCS — SIGNIFICANT CHANGE UP (ref 0–0)
PHOSPHATE SERPL-MCNC: 4.1 MG/DL — SIGNIFICANT CHANGE UP (ref 2.1–4.9)
PLATELET # BLD AUTO: 406 K/UL — HIGH (ref 130–400)
PMV BLD: 10 FL — SIGNIFICANT CHANGE UP (ref 7.4–10.4)
POTASSIUM SERPL-MCNC: 5.3 MMOL/L — HIGH (ref 3.5–5)
POTASSIUM SERPL-SCNC: 5.3 MMOL/L — HIGH (ref 3.5–5)
PROT SERPL-MCNC: 5 G/DL — LOW (ref 6–8)
RBC # BLD: 2.52 M/UL — LOW (ref 4.2–5.4)
RBC # FLD: 14.6 % — HIGH (ref 11.5–14.5)
SODIUM SERPL-SCNC: 138 MMOL/L — SIGNIFICANT CHANGE UP (ref 135–146)
WBC # BLD: 9.98 K/UL — SIGNIFICANT CHANGE UP (ref 4.8–10.8)
WBC # FLD AUTO: 9.98 K/UL — SIGNIFICANT CHANGE UP (ref 4.8–10.8)

## 2024-11-20 PROCEDURE — 99239 HOSP IP/OBS DSCHRG MGMT >30: CPT

## 2024-11-20 RX ORDER — SODIUM ZIRCONIUM CYCLOSILICATE 5 G/5G
10 POWDER, FOR SUSPENSION ORAL
Qty: 50 | Refills: 0
Start: 2024-11-20 | End: 2024-11-24

## 2024-11-20 RX ORDER — HEPARIN SODIUM,PORCINE 1000/ML
5000 VIAL (ML) INJECTION
Qty: 0 | Refills: 0 | DISCHARGE
Start: 2024-11-20

## 2024-11-20 RX ORDER — EMPAGLIFLOZIN 25 MG/1
1 TABLET, FILM COATED ORAL
Refills: 0 | DISCHARGE

## 2024-11-20 RX ORDER — LINAGLIPTIN 5 MG/1
1 TABLET, FILM COATED ORAL
Refills: 0 | DISCHARGE

## 2024-11-20 RX ORDER — LEVETIRACETAM 1000 MG/1
15 TABLET ORAL
Qty: 0 | Refills: 0 | DISCHARGE
Start: 2024-11-20

## 2024-11-20 RX ORDER — ZINC SULFATE 50(220)MG
1 TABLET ORAL
Qty: 0 | Refills: 0 | DISCHARGE
Start: 2024-11-20

## 2024-11-20 RX ORDER — LORAZEPAM 2 MG/1
2 TABLET ORAL
Qty: 5 | Refills: 0
Start: 2024-11-20

## 2024-11-20 RX ORDER — AMANTADINE HCL 100 MG
50 CAPSULE ORAL
Qty: 0 | Refills: 0 | DISCHARGE
Start: 2024-11-20

## 2024-11-20 RX ORDER — LACOSAMIDE 10 MG/ML
20 INJECTION INTRAVENOUS
Qty: 0 | Refills: 0 | DISCHARGE
Start: 2024-11-20

## 2024-11-20 RX ORDER — HYDRALAZINE HYDROCHLORIDE 10 MG/1
1 TABLET ORAL
Qty: 0 | Refills: 0 | DISCHARGE
Start: 2024-11-20

## 2024-11-20 RX ORDER — SODIUM ZIRCONIUM CYCLOSILICATE 5 G/5G
10 POWDER, FOR SUSPENSION ORAL ONCE
Refills: 0 | Status: COMPLETED | OUTPATIENT
Start: 2024-11-20 | End: 2024-11-20

## 2024-11-20 RX ADMIN — CHLORHEXIDINE GLUCONATE 1 APPLICATION(S): 1.2 RINSE ORAL at 06:44

## 2024-11-20 RX ADMIN — Medication 1 APPLICATION(S): at 06:44

## 2024-11-20 RX ADMIN — Medication 1 PACKET(S): at 06:43

## 2024-11-20 RX ADMIN — AMLODIPINE BESYLATE 10 MILLIGRAM(S): 10 TABLET ORAL at 06:43

## 2024-11-20 RX ADMIN — Medication 6: at 09:28

## 2024-11-20 RX ADMIN — LEVETIRACETAM 1500 MILLIGRAM(S): 1000 TABLET ORAL at 06:44

## 2024-11-20 RX ADMIN — Medication 2: at 15:18

## 2024-11-20 RX ADMIN — Medication 50 MILLIGRAM(S): at 15:00

## 2024-11-20 RX ADMIN — SODIUM ZIRCONIUM CYCLOSILICATE 10 GRAM(S): 5 POWDER, FOR SUSPENSION ORAL at 11:14

## 2024-11-20 RX ADMIN — Medication 220 MILLIGRAM(S): at 12:02

## 2024-11-20 RX ADMIN — Medication 50 MILLIGRAM(S): at 06:43

## 2024-11-20 RX ADMIN — Medication 5000 UNIT(S): at 06:43

## 2024-11-20 RX ADMIN — Medication 500 MILLIGRAM(S): at 12:02

## 2024-11-20 RX ADMIN — HYDRALAZINE HYDROCHLORIDE 25 MILLIGRAM(S): 10 TABLET ORAL at 06:43

## 2024-11-20 RX ADMIN — HYDRALAZINE HYDROCHLORIDE 25 MILLIGRAM(S): 10 TABLET ORAL at 15:00

## 2024-11-20 RX ADMIN — LABETALOL 200 MILLIGRAM(S): 100 TABLET, FILM COATED ORAL at 15:01

## 2024-11-20 RX ADMIN — LACOSAMIDE 140 MILLIGRAM(S): 10 INJECTION INTRAVENOUS at 06:42

## 2024-11-20 RX ADMIN — Medication 1 PACKET(S): at 15:00

## 2024-11-20 RX ADMIN — Medication 250 MILLIGRAM(S): at 06:44

## 2024-11-20 RX ADMIN — LABETALOL 200 MILLIGRAM(S): 100 TABLET, FILM COATED ORAL at 06:43

## 2024-11-20 NOTE — DISCHARGE NOTE NURSING/CASE MANAGEMENT/SOCIAL WORK - FINANCIAL ASSISTANCE
[facility] provides services at a reduced cost to those who are determined to be eligible through [facility]’s financial assistance program. Information regarding [facility]’s financial assistance program can be found by going to [email or web address] or by calling [phone].

## 2024-11-20 NOTE — ADVANCED PRACTICE NURSE CONSULT - RECOMMEDATIONS
Cleanse wound to coccyx with Vashe wound cleanser   Pat dry, apply Medihoney, Xeroform and abdominal pad twice a day and prn for soiling.     Cleanse bilateral posterior thigh skin folds with soap and water  Pat dry, apply moisture barrier cream twice a day, prn for soiling    Maintain pressure injury prevention.   Keep skin clean.   Maintain incontinence care.   Monitor skin for changes and notify provider   Rest of care per primary team  Case discussed with primary RN beside
Cleanse wound to coccyx and right buttock with soap and water.   Pat dry apply triad twice a day and prn for soiling.     Maintain pressure injury prevention.   Keep skin clean.   Maintain incontinence care.   Monitor skin for changes and notify provider   Case discussed with primary RN

## 2024-11-20 NOTE — ADVANCED PRACTICE NURSE CONSULT - REASON FOR CONSULT
Pressure Injury Assessment and Treatment Recommendation
Pressure Injury Assessment and Treatment Recommendation

## 2024-11-20 NOTE — PROGRESS NOTE ADULT - PROVIDER SPECIALTY LIST ADULT
Gastroenterology
Gastroenterology
Internal Medicine
NSICU
NSICU
Internal Medicine
Internal Medicine
NSICU
Neurology
Neurology
Internal Medicine
Internal Medicine
NSICU
Neurology
Neurology
Nutrition Support
Hospitalist
Internal Medicine
Internal Medicine
NSICU
Nutrition Support
Nutrition Support
NSICU
Palliative Care

## 2024-11-20 NOTE — PROGRESS NOTE ADULT - ASSESSMENT
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 While in NSICU, patient underwent MMA embolization and post operatively was stable, however, on 10/16 she was noted to have leaking from her crani site. STAT CTH showed increase in SDH and extracalvarial collection, NSG removed 25cc of blood and was re-sutured. Patient was then downgraded to the floor, was started on zosyn for UTI, and was discharged to short term rehab on 10/27. Patient returns to [facility] due to worsening lethargy, admitted to NCCU due to concern for seizures, now downgraded to SDU    Recently Diagnosed SDH  Concern for Status Epilepticus  Dysphagia s/p PEG on 11/12  Complete Immobility 2/2 SDH/Seizures   - s/p ketamine gtt in NCCU  - MR Brain 11/5 - shows stable SDH  - Serial CTH stable   - SBP goal: 110 -  <150  - s/p vEEG meds adjusted to Keppra 1500 mg BID and Lacosamide 200 q12   - Monitor AED levels and f/u  Neurology   - c/w amantadine 50mg q 12 daily and monitor for clinical improvement   - s/p PEG placement 11/12, so far tolerating feeds     UTI with low grade temps - resolved   - UCx + Enterococcus s/p Ampicillin  - 11/12 overnight temp of 100.3-> repeat blood cx NGTD.   - No further Fevers   - Monitor Diarrhea     HTN   - SBP goal: 110 -  <150  - Echo  10/9/24: Normal global left ventricular systolic function. Left ventricular ejection fraction, by visual estimation, is 65 to 70%  - c/w lisinopril 20mg , labetalol 200mg q 8; c/w norvasc 10mg.      MARCO on CKD improving  Creatinine peaked at 1.7 --> currently at 0.9 (baseline)  Mild Hyperkalemia  - Monitor BMP, c/w bladder scans q8H  - Lokelma if K >5.5    MDS with Anemia, receives PRN PRBC  - s/p 1u PRBC transfusion 11/7 and 11/14   - monitor CBC< transfuse if Hb <4  - OP fu with Dr Wade     Overall Prognosis is guarded    Pending:   _ Monitor off vEEG per Neurology /  _ Monitor VS off abx,   _c/w PEG feeds   / d/c planning

## 2024-11-20 NOTE — DISCHARGE NOTE PROVIDER - CARE PROVIDER_API CALL
Kaveh Wade  Hematology  [address] [address]  Phone: [phone]  Fax: [phone]  Follow Up Time: 1 month    Adan Hatfield  Neurology  [address] [facility] [address] [address], [address]  [place] [address]  Phone: [phone]  Fax: [phone]  Follow Up Time: 2 weeks    David Torres  Internal Medicine  [address] [place] [id number]  Phone: ()-  Fax: ()-  Follow Up Time: 1 week

## 2024-11-20 NOTE — PROGRESS NOTE ADULT - REASON FOR ADMISSION
SDH, Seizures

## 2024-11-20 NOTE — DISCHARGE NOTE PROVIDER - NSDCFUADDAPPT_GEN_ALL_CORE_FT
APPTS ARE READY TO BE MADE: [ ] YES    Best Family or Patient Contact (if needed):    Additional Information about above appointments (if needed):    1: Dr Wade   2: Dr Hatfield   3: Internal Medicine    Other comments or requests:

## 2024-11-20 NOTE — DISCHARGE NOTE PROVIDER - NSDCFUADDINST_GEN_ALL_CORE_FT
Wound Care:  Cleanse wound to coccyx with Vashe wound cleanser   Pat dry, apply Medihoney, Xeroform and abdominal pad twice a day and prn for soiling.     Cleanse bilateral posterior thigh skin folds with soap and water  Pat dry, apply moisture barrier cream twice a day, prn for soiling    Maintain pressure injury prevention.   Keep skin clean.   Maintain incontinence care.   Monitor skin for changes and notify provider   Rest of care per primary team  Case discussed with primary RN beside

## 2024-11-20 NOTE — PROGRESS NOTE ADULT - SUBJECTIVE AND OBJECTIVE BOX
24H events:    Patient is a 79y old Female who presents with a chief complaint of SDH, Seizures (19 Nov 2024 16:13)    Primary diagnosis of Sepsis    Today is hospital day 20d. This morning patient was seen and examined at bedside  Does not appear in any acute distress   No acute or major events overnight. Hemodynamically stable      Code Status: Clear GOC discussion with palliative on 11/04 --> patient is full code    PAST MEDICAL & SURGICAL HISTORY  DM (diabetes mellitus)  MDS (myelodysplastic syndrome)  H/O colonoscopy    ALLERGIES:  No Known Allergies    MEDICATIONS:  STANDING MEDICATIONS  amantadine Syrup 50 milliGRAM(s) Oral <User Schedule>  amLODIPine   Tablet 10 milliGRAM(s) Oral daily  ascorbic acid 500 milliGRAM(s) Oral daily  bacitracin   Ointment 1 Application(s) Topical two times a day  bacitracin   Ointment 1 Application(s) Topical two times a day  chlorhexidine 2% Cloths 1 Application(s) Topical <User Schedule>  dextrose 5%. 1000 milliLiter(s) IV Continuous <Continuous>  dextrose 5%. 1000 milliLiter(s) IV Continuous <Continuous>  dextrose 50% Injectable 25 Gram(s) IV Push once  dextrose 50% Injectable 12.5 Gram(s) IV Push once  dextrose 50% Injectable 25 Gram(s) IV Push once  glucagon  Injectable 1 milliGRAM(s) IntraMuscular once  heparin   Injectable 5000 Unit(s) SubCutaneous every 12 hours  hydrALAZINE 25 milliGRAM(s) Oral every 8 hours  insulin lispro (ADMELOG) corrective regimen sliding scale   SubCutaneous every 8 hours  labetalol 200 milliGRAM(s) Oral every 8 hours  lacosamide IVPB 200 milliGRAM(s) IV Intermittent every 12 hours  levETIRAcetam   Injectable 1500 milliGRAM(s) IV Push every 12 hours  psyllium Powder 1 Packet(s) Oral every 8 hours  saccharomyces boulardii 250 milliGRAM(s) Oral two times a day  sodium zirconium cyclosilicate 10 Gram(s) Oral once  zinc sulfate 220 milliGRAM(s) Oral daily    PRN MEDICATIONS  acetaminophen     Tablet .. 650 milliGRAM(s) Oral every 6 hours PRN  dextrose Oral Gel 15 Gram(s) Oral once PRN    VITALS:   Vital Signs Last 24 Hrs  T(C): 36.5 (20 Nov 2024 04:59), Max: 37.4 (19 Nov 2024 20:55)  T(F): 97.7 (20 Nov 2024 04:59), Max: 99.3 (19 Nov 2024 20:55)  HR: 83 (20 Nov 2024 04:59) (81 - 89)  BP: 152/76 (20 Nov 2024 04:59) (135/72 - 152/76)  RR: 18 (20 Nov 2024 04:59) (18 - 18)  SpO2: 100% (20 Nov 2024 04:59) (98% - 100%)    Parameters below as of 20 Nov 2024 04:59  Patient On (Oxygen Delivery Method): room air    PHYSICAL EXAM:  GENERAL:  NAD chronically ill appearing   SKIN: No rashes or lesions  HEENT: Atraumatic. Normocephalic.    NECK: Supple, No JVD.    PULMONARY: CTA B/L. No wheezing. No rales  CVS: Normal S1, S2. Rate and Rhythm are regular.   ABDOMEN/GI: Soft, Nontender, Nondistended   MSK:  No clubbing or cyanosis   NEUROLOGIC: arousable to sternal rub, alert but does not follow commands   PSYCH: not awake or alert       [facility] score: 6 for daily activity and basic mobility    (  ) SPC        (  ) pigtail       ( X ) PEG tube       (  ) colostomy       (  ) jejunostomy  (  ) U-Dall    LABS:                        7.5    9.98  )-----------( 406      ( 20 Nov 2024 06:42 )             23.5     11-20    138  |  108  |  38[H]  ----------------------------<  171[H]  5.3[H]   |  19  |  0.9    Ca    9.2      20 Nov 2024 06:42  Phos  4.1     11-20  Mg     1.9     11-20    TPro  5.0[L]  /  Alb  3.3[L]  /  TBili  0.3  /  DBili  x   /  AST  18  /  ALT  22  /  AlkPhos  112  11-20    Urinalysis Basic - ( 20 Nov 2024 06:42 )  Color: x / Appearance: x / SG: x / pH: x  Gluc: 171 mg/dL / Ketone: x  / Bili: x / Urobili: x   Blood: x / Protein: x / Nitrite: x   Leuk Esterase: x / RBC: x / WBC x   Sq Epi: x / Non Sq Epi: x / Bacteria: x      RADIOLOGY:  ACC: [id number] EXAM:  XR KUB 1 VIEW   ORDERED BY: HOOD SANCHEZ     PROCEDURE DATE:  11/18/2024      INTERPRETATION:  CLINICAL HISTORY / REASON FOR EXAM: Abdominal pain    COMPARISON: None.    TECHNIQUE: Abdominal radiograph, one image    FINDINGS:    TUBES/LINES: None.    PERITONEUM/MESENTERY/BOWEL: Nondilated loops of small and large bowel.   Limited evaluation for pneumoperitoneum on supine radiographs.    BONES/SOFT TISSUES: Degenerative changes.    IMPRESSION:    Nonobstructive bowelgas pattern.    --- End of Report ---    THOM GUZMAN MD; Attending Radiologist  This document has been electronically signed. Nov 18 2024  6:47PM

## 2024-11-20 NOTE — DISCHARGE NOTE PROVIDER - CARE PROVIDERS DIRECT ADDRESSES
[email or web address], 1928–1983.[facility]

## 2024-11-20 NOTE — DISCHARGE NOTE PROVIDER - NSDCCPCAREPLAN_GEN_ALL_CORE_FT
PRINCIPAL DISCHARGE DIAGNOSIS  Diagnosis: Seizure  Assessment and Plan of Treatment: You presented to the hospital with altered mental status. Given your history of subdural hematoma that requried multiple neurosurgical intervention, brain imaging was repeated with CT and MRI and found stable hematoma. Based on EEG findings and concern for seizure activity, you were started on a new medication called Vimpat and your Keppra was increased. Continue taking your medications as prescribed and should you have any clinical worsening, please seek care immediately. We will also send Ativan to be used as needed, with a 2 mg injection for generalized seizure lasting mroe than 2 minute. If this arises and the patient requries this medication, call [phone]      SECONDARY DISCHARGE DIAGNOSES  Diagnosis: Urinary tract infection  Assessment and Plan of Treatment: Your urine culture was positive for Enterococcusafter 11/12 overnight temp of 100.3-> repeat blood  culture was negative. You finished a course of Amipicillin and did not have any further fevers. Should you have any new symptoms of urinary infection or fevers, pelase seek care imemdiately     PRINCIPAL DISCHARGE DIAGNOSIS  Diagnosis: Seizure  Assessment and Plan of Treatment: You presented to the hospital with altered mental status. Given your history of subdural hematoma that requried multiple neurosurgical intervention, brain imaging was repeated with CT and MRI and found stable hematoma. Based on EEG findings and concern for seizure activity, you were started on a new medication called Vimpat and your Keppra was increased. Continue taking your medications as prescribed and should you have any clinical worsening, please seek care immediately. Please call EMS if any recurrent seizures.      SECONDARY DISCHARGE DIAGNOSES  Diagnosis: Urinary tract infection  Assessment and Plan of Treatment: Your urine culture was positive for Enterococcusafter 11/12 overnight temp of 100.3-> repeat blood  culture was negative. You finished a course of Amipicillin and did not have any further fevers. Should you have any new symptoms of urinary infection or fevers, pelase seek care imemdiately    Diagnosis: Diabetes  Assessment and Plan of Treatment: Please continue Glimepiride for diabetes for now, as you have not required significant insulin in the hospital. You were previously on Jardiance, Pioglitazone, Tradjenta which we will hold, but please restart one at a time if blood glucose is poorly controlled.    Diagnosis: MDS (myelodysplastic syndrome)  Assessment and Plan of Treatment: Please follow up OP with Dr Wade for transfusions as needed     PRINCIPAL DISCHARGE DIAGNOSIS  Diagnosis: Seizure  Assessment and Plan of Treatment: You presented to the hospital with altered mental status. Given your history of subdural hematoma that requried multiple neurosurgical intervention, brain imaging was repeated with CT and MRI and found stable hematoma. Based on EEG findings and concern for seizure activity, you were started on a new medication called Vimpat and your Keppra was increased. Continue taking your medications as prescribed and should you have any clinical worsening, please seek care immediately. Please call EMS if any recurrent seizures.      SECONDARY DISCHARGE DIAGNOSES  Diagnosis: Urinary tract infection  Assessment and Plan of Treatment: Your urine culture was positive for Enterococcusafter 11/12 overnight temp of 100.3-> repeat blood  culture was negative. You finished a course of Amipicillin and did not have any further fevers. Should you have any new symptoms of urinary infection or fevers, pelase seek care imemdiately    Diagnosis: Diabetes  Assessment and Plan of Treatment: Please continue Glimepiride for diabetes for now, as you have not required significant insulin in the hospital. You were previously on Jardiance, Pioglitazone, Tradjenta which we will hold, but please restart one at a time if blood glucose is poorly controlled.    Diagnosis: MDS (myelodysplastic syndrome)  Assessment and Plan of Treatment: Please follow up OP with Dr Wade for transfusions as needed    Diagnosis: Hyperkalemia  Assessment and Plan of Treatment: You were found to have mildly elevated potassium levels. COntinue taking Lokelma daily for 5 days then recheck potassium after to see if adjusted

## 2024-11-20 NOTE — DISCHARGE NOTE PROVIDER - INSTRUCTIONS
Diet, NPO with Tube Feed:   Tube Feeding Modality: Gastrostomy  Glucerna 1.2 Ash (GLUCERNARTH)  Total Volume for 24 Hours (mL): 1080  Bolus  Total Volume of Bolus (mL):  360  Tube Feed Frequency: Every 8 hours   Tube Feed Start Time: 12:00  Bolus Feed Rate (mL per Hour): 500   Bolus Feed Duration (in Hours): 1  Bolus Feed Instructions:  360 ml at 7 am, 480 ml at 1 pm, 360 ml at 7 pm  check poc glucose and give any indicated insulin prior to each feeding  Free Water Flush Instructions:  flush water 50 ml before and 100 ml after each feeding

## 2024-11-20 NOTE — DISCHARGE NOTE PROVIDER - HOSPITAL COURSE
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 While in NSICU, patient underwent MMA embolization and post operatively was stable, however, on 10/16 she was noted to have leaking from her crani site. STAT CTH showed increase in SDH and extracalvarial collection, NSG removed 25cc of blood and was re-sutured. Patient was then downgraded to the floor, was started on zosyn for UTI, and was discharged to short term rehab on 10/27. Today, patient was more lethargic and presented to the ED for evaluation. CTH showing stable SDH, CTA negative for acute stroke. NSx and NSICU consulted for management.     NICU course:  •10/31: Returned from NH after being altered, CTH stable, concern for status epilepticus, given keppra, GCS improved, started on ABX for sepsis  •11/1: lowered keppra for CrCl, added vimpat. switched cefepime to zosyn, given 1 PRBC, nPO failed dysphagia screen overnight, IVF increased to 75 , needs restrain not to pull drain, can give zyprexa or fentanyl   •11/2: Ketamine gtt started for possible CSD  •11/3: K-hole increased to 1.5, labetalol PO started  •11/4: K-hole titration  •11/7: s/p 1 unit PRBC for hbg 6.5  •11/8: EEG negative, keppra level sent  CTH EEG showed stable findings. UCx + for enterococcus. s/p ampicillin. s/p PEG on 11/12    CEU course:  started feeds 11/13am. advance as tolerated. refeeding labs tm am. f/u keppra level. As per Neuro:  d/c vEEG, continue vimpat 200 mg BID, continue keppra 1500 mg BIDAtivan 2mg IV for GTCs > 2 min only. Routine neurochecks - Seizure precautions Maintain Mg> 2 mmol/l. Seizure free for the last 3 days, exam unchanged. Mental status likely multifactorial in nature. Presumed poor cognitive reserve given original brain insult and complications, complicate by recent epileptic events. EEG shows generalized and focal slowing    Recently Diagnosed SDH  Concern for Status Epilepticus  Dysphagia s/p PEG on 11/12  Complete Immobility 2/2 SDH/Seizures   - s/p ketamine gtt in NCCU  - MR Brain 11/5 - shows stable SDH  - Serial CTH stable   - SBP goal: 110 -  <150  - s/p vEEG meds adjusted to Keppra 1500 mg BID and Lacosamide 200 q12   - Monitor AED levels and f/u  Neurology   - c/w amantadine 50mg q 12 daily and monitor for clinical improvement   - s/p PEG placement 11/12, so far tolerating feeds     UTI with low grade temps - resolved   - UCx + Enterococcus s/p Ampicillin  - 11/12 overnight temp of 100.3-> repeat blood cx NGTD.   - No further Fevers   - Monitor Diarrhea     HTN   - SBP goal: 110 -  <150  - Echo  10/9/24: Normal global left ventricular systolic function. Left ventricular ejection fraction, by visual estimation, is 65 to 70%  - c/w lisinopril 20mg , labetalol 200mg q 8; c/w norvasc 10mg.      MARCO on CKD improving  Mild Hyperkalemia  - Monitor BMP, c/w bladder scans q8H  - Lokelma if K >5.5    MDS with Anemia, receives PRN PRBC  - s/p 1u PRBC transfusion 11/7 and 11/14   - monitor CBC< transfuse if Hb <4  - OP fu with Dr Wade     Patient seen and evaluated today. Stable off antibiotics and clinically off vEEG monitoring for discharge  Discussed during morning rounds   79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 While in NSICU, patient underwent MMA embolization and post operatively was stable, however, on 10/16 she was noted to have leaking from her crani site. STAT CTH showed increase in SDH and extracalvarial collection, NSG removed 25cc of blood and was re-sutured. Patient was then downgraded to the floor, was started on zosyn for UTI, and was discharged to short term rehab on 10/27. Today, patient was more lethargic and presented to the ED for evaluation. CTH showing stable SDH, CTA negative for acute stroke. NSx and NSICU consulted for management.     NICU course:  •10/31: Returned from NH after being altered, CTH stable, concern for status epilepticus, given keppra, GCS improved, started on ABX for sepsis  •11/1: lowered keppra for CrCl, added vimpat. switched cefepime to zosyn, given 1 PRBC, nPO failed dysphagia screen overnight, IVF increased to 75 , needs restrain not to pull drain, can give zyprexa or fentanyl   •11/2: Ketamine gtt started for possible CSD  •11/3: K-hole increased to 1.5, labetalol PO started  •11/4: K-hole titration  •11/7: s/p 1 unit PRBC for hbg 6.5  •11/8: EEG negative, keppra level sent  CTH EEG showed stable findings. UCx + for enterococcus. s/p ampicillin. s/p PEG on 11/12    CEU course:  started feeds 11/13am. advance as tolerated. refeeding labs tm am. f/u keppra level. As per Neuro:  d/c vEEG, continue vimpat 200 mg BID, continue keppra 1500 mg BIDAtivan 2mg IV for GTCs > 2 min only. Routine neurochecks - Seizure precautions Maintain Mg> 2 mmol/l. Seizure free for the last 3 days, exam unchanged. Mental status likely multifactorial in nature. Presumed poor cognitive reserve given original brain insult and complications, complicate by recent epileptic events. EEG shows generalized and focal slowing    Recently Diagnosed SDH  R/o Status Epilepticus  Dysphagia s/p PEG on 11/12  Complete Immobility 2/2 SDH/Seizures   - s/p ketamine gtt in NCCU  - MR Brain 11/5 - shows stable SDH  - Serial CTH stable   - SBP goal: 110 -  <150  - s/p vEEG meds adjusted to Keppra 1500 mg BID and Lacosamide 200 q12   - Monitor AED levels and f/u  Neurology   - c/w amantadine 50mg q 12 daily and monitor for clinical improvement   - s/p PEG placement 11/12, tolerating feeds     UTI with low grade temps - resolved   - UCx + Enterococcus s/p Ampicillin  - 11/12 overnight temp of 100.3-> repeat blood cx NGTD.   - No further Fevers     HTN   - SBP goal: 110 -  <150  - Echo  10/9/24: Normal global left ventricular systolic function. Left ventricular ejection fraction, by visual estimation, is 65 to 70%  - c/w lisinopril 20mg , labetalol 200mg q 8; c/w norvasc 10mg.      MARCO on CKD improving  Mild Hyperkalemia  - Monitor BMP, c/w bladder scans q8H  - Lokelma if K >5.5    MDS with Anemia, receives PRN PRBC  - s/p 1u PRBC transfusion 11/7 and 11/14   - monitor CBC< transfuse if Hb <4  - OP fu with Dr Wade     Patient seen and evaluated today. Stable off antibiotics and off vEEG  Discussed during morning rounds

## 2024-11-20 NOTE — OCCUPATIONAL THERAPY INITIAL EVALUATION ADULT - PERTINENT HX OF CURRENT PROBLEM, REHAB EVAL
Pt is a 79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 While in NSICU, patient underwent MMA embolization and post operatively was stable, however, on 10/16 she was noted to have leaking from her crani site. STAT CTH showed increase in SDH and extracalvarial collection, NSG removed 25cc of blood and was re-sutured. Patient was then downgraded to the floor, was started on zosyn for UTI, and was discharged to short term rehab on 10/27. Today, patient was more lethargic and presented to the ED for evaluation. CTH showing stable SDH, CTA negative for acute stroke. NSx and NSICU consulted for management.

## 2024-11-20 NOTE — ADVANCED PRACTICE NURSE CONSULT - ASSESSMENT
History of Present Illness:   79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 While in NSICU, patient underwent MMA embolization and post operatively was stable, however, on 10/16 she was noted to have leaking from her crani site. STAT CTH showed increase in SDH and extracalvarial collection, NSG removed 25cc of blood and was re-sutured. Patient was then downgraded to the floor, was started on zosyn for UTI, and was discharged to short term rehab on 10/27. Today, patient was more lethargic and presented to the ED for evaluation. CTH showing stable SDH, CTA negative for acute stroke. NSx and NSICU consulted for management.     Patient received lying in bed. Limited mobility. Incontinent of stool. Small in place. High risk for pressure injury development and progression.    Type of Wound: Unstageable Pressure Injury (progressed from stage 2)  Location: Coccyx   Measurements: ~3.6mzy7zb  Tunneling/ Undermining: No  Wound bed: Dry adherent yellow tissue with dark brown tissue present  Wound edges: Intact  Periwound: Macerated  Wound exudate: None  Wound odor: No  Induration, erythema, warmth: No  Wound pain: No    Dry light pink tissue to bilateral posterior thigh skin folds.     Skin to bilateral heels intact a time of assessment.     Dry light pink intact tissue to left forearm at time of assessment. No skin breakdown to left hand.     Skin assessed with primary RN bedside.

## 2024-11-20 NOTE — OCCUPATIONAL THERAPY INITIAL EVALUATION ADULT - ADDITIONAL COMMENTS
+unable to get answers from pt, based on chart, prior level of functioning was dependent +pt is non-verbal and unable to follow simple commands

## 2024-11-20 NOTE — OCCUPATIONAL THERAPY INITIAL EVALUATION ADULT - GENERAL OBSERVATIONS, REHAB EVAL
Pt encountered semi lau in bed in NAD(non-verbal) +IV locked +peg tube +b/l sequentials +b/l caire boots +primafit. Pt was not able to make needs known and follow simple one step commands, non-verbal, pt was not able to respond to name called. Pt left where she was found, required nursing care, RN Rikki made aware.

## 2024-11-20 NOTE — DISCHARGE NOTE NURSING/CASE MANAGEMENT/SOCIAL WORK - PATIENT PORTAL LINK FT
You can access the FollowMyHealth Patient Portal offered by [facility] by registering at the following website: [email or web address]. By joining [facility]’s FollowMyHealth portal, you will also be able to view your health information using other applications (apps) compatible with our system.

## 2024-11-20 NOTE — DISCHARGE NOTE PROVIDER - PROVIDER TOKENS
PROVIDER:[TOKEN:[[id number]],FOLLOWUP:[1 month]],PROVIDER:[TOKEN:[[id number]],FOLLOWUP:[2 weeks]],PROVIDER:[TOKEN:[[id number]],FOLLOWUP:[1 week]]

## 2024-11-20 NOTE — DISCHARGE NOTE PROVIDER - ATTENDING DISCHARGE PHYSICAL EXAMINATION:
GENERAL:  NAD chronically ill appearing   SKIN: No rashes or lesions  HEENT: Atraumatic. Normocephalic.    NECK: Supple, No JVD.    PULMONARY: CTA B/L. No wheezing. No rales  CVS: Normal S1, S2. Rate and Rhythm are regular.   ABDOMEN/GI: Soft, Nontender, Nondistended   MSK:  No clubbing or cyanosis   NEUROLOGIC: arousable to sternal rub, does not follow commands   PSYCH: not awake or alert

## 2024-11-20 NOTE — DISCHARGE NOTE PROVIDER - NSDCFUSCHEDAPPT_GEN_ALL_CORE_FT
Harrison Cornell [facility]  NEUROSURG [address]  Scheduled Appointment: 11/26/2024    Harrison Cornell  [place] [facility]  NEUROSURG [address]  Scheduled Appointment: 01/07/2025

## 2024-11-20 NOTE — DISCHARGE NOTE PROVIDER - NSDCMRMEDTOKEN_GEN_ALL_CORE_FT
amantadine 50 mg/5 mL oral liquid: 50 milligram(s) orally 2 times a day  amLODIPine 10 mg oral tablet: 1 tab(s) orally once a day  glimepiride 2 mg oral tablet: 1 tab(s) orally 2 times a day  heparin: 5,000 unit(s) subcutaneous 2 times a day  hydrALAZINE 25 mg oral tablet: 1 tab(s) orally every 8 hours  Jardiance 10 mg oral tablet: 1 tab(s) orally once a day  labetalol 200 mg oral tablet: 1 tab(s) orally every 8 hours  lacosamide 200 mg/20 mL intravenous solution: 20 milliliter(s) intravenous every 12 hours  levETIRAcetam 100 mg/mL intravenous solution: 15 milliliter(s) intravenous every 12 hours  lisinopril 10 mg oral tablet: 1 tab(s) orally once a day  LORazepam 2 mg/mL injectable solution: 2 milligram(s) intravenously once a day as needed for  generalized tonic cloic sezure more than 2 minutes  in duration To be used as needed for generalized tonico-clonic seizures lasting more than 2 minutes MDD: 4 mg  pantoprazole 40 mg oral delayed release tablet: 1 tab(s) orally once a day (before a meal)  pioglitazone 30 mg oral tablet: 1 tab(s) orally once a day  polyethylene glycol 3350 oral powder for reconstitution: 17 gram(s) orally once a day  pyridoxine 100 mg oral tablet: 1 tab(s) orally once a day  senna leaf extract oral tablet: 2 tab(s) orally once a day (at bedtime)  SPS 15 GM/60 ML SUSPENSION: TAKE 15ML TWICE WEEKLY  Tradjenta 5 mg oral tablet: 1 tab(s) orally once a day  zinc sulfate 220 mg (as elemental zinc 50 mg) oral capsule: 1 cap(s) orally once a day Continue for 3 more weeks and then stop   amantadine 50 mg/5 mL oral liquid: 50 milligram(s) orally 2 times a day  amLODIPine 10 mg oral tablet: 1 tab(s) orally once a day  glimepiride 2 mg oral tablet: 1 tab(s) orally 2 times a day  heparin: 5,000 unit(s) subcutaneous 2 times a day  hydrALAZINE 25 mg oral tablet: 1 tab(s) orally every 8 hours  labetalol 200 mg oral tablet: 1 tab(s) orally every 8 hours  lacosamide 200 mg/20 mL intravenous solution: 20 milliliter(s) intravenous every 12 hours  levETIRAcetam 100 mg/mL intravenous solution: 15 milliliter(s) intravenous every 12 hours  lisinopril 10 mg oral tablet: 1 tab(s) orally once a day  pantoprazole 40 mg oral delayed release tablet: 1 tab(s) orally once a day (before a meal)  polyethylene glycol 3350 oral powder for reconstitution: 17 gram(s) orally once a day  pyridoxine 100 mg oral tablet: 1 tab(s) orally once a day  senna leaf extract oral tablet: 2 tab(s) orally once a day (at bedtime)  SPS 15 GM/60 ML SUSPENSION: TAKE 15ML TWICE WEEKLY  zinc sulfate 220 mg (as elemental zinc 50 mg) oral capsule: 1 cap(s) orally once a day Continue for 3 more weeks and then stop   amantadine 50 mg/5 mL oral liquid: 50 milligram(s) orally 2 times a day  amLODIPine 10 mg oral tablet: 1 tab(s) orally once a day  glimepiride 2 mg oral tablet: 1 tab(s) orally 2 times a day  heparin: 5,000 unit(s) subcutaneous 2 times a day  hydrALAZINE 25 mg oral tablet: 1 tab(s) orally every 8 hours  labetalol 200 mg oral tablet: 1 tab(s) orally every 8 hours  lacosamide 200 mg/20 mL intravenous solution: 20 milliliter(s) intravenous every 12 hours  levETIRAcetam 100 mg/mL intravenous solution: 15 milliliter(s) intravenous every 12 hours  lisinopril 10 mg oral tablet: 1 tab(s) orally once a day  Lokelma 10 g oral powder for reconstitution: 10 gram(s) orally once a day Give Lokelma 10 g daily for 5 days and then stop - check serum potassium after day 5 to evaluate  pantoprazole 40 mg oral delayed release tablet: 1 tab(s) orally once a day (before a meal)  polyethylene glycol 3350 oral powder for reconstitution: 17 gram(s) orally once a day  pyridoxine 100 mg oral tablet: 1 tab(s) orally once a day  senna leaf extract oral tablet: 2 tab(s) orally once a day (at bedtime)  SPS 15 GM/60 ML SUSPENSION: TAKE 15ML TWICE WEEKLY  zinc sulfate 220 mg (as elemental zinc 50 mg) oral capsule: 1 cap(s) orally once a day Continue for 3 more weeks and then stop

## 2024-11-20 NOTE — OCCUPATIONAL THERAPY INITIAL EVALUATION ADULT - RANGE OF MOTION EXAMINATION, UPPER EXTREMITY
no active ROM observed, unable to follow simple commands despite max cues/bilateral UE Passive ROM was WFL  (within functional limits)

## 2024-11-21 LAB — LACOSAMIDE (VIMPAT) RESULT: 13.26 UG/ML — HIGH (ref 1–10)

## 2024-11-26 ENCOUNTER — APPOINTMENT (OUTPATIENT)
Dept: NEUROSURGERY | Facility: CLINIC | Age: 79
End: 2024-11-26

## 2024-12-10 ENCOUNTER — INPATIENT (INPATIENT)
Facility: HOSPITAL | Age: 79
LOS: 5 days | Discharge: HOME | DRG: 871 | End: 2024-12-16
Attending: INTERNAL MEDICINE | Admitting: STUDENT IN AN ORGANIZED HEALTH CARE EDUCATION/TRAINING PROGRAM
Payer: MEDICARE

## 2024-12-10 VITALS
RESPIRATION RATE: 18 BRPM | SYSTOLIC BLOOD PRESSURE: 164 MMHG | HEART RATE: 105 BPM | TEMPERATURE: 98 F | WEIGHT: 136.91 LBS | DIASTOLIC BLOOD PRESSURE: 67 MMHG | HEIGHT: 64 IN | OXYGEN SATURATION: 94 %

## 2024-12-10 DIAGNOSIS — Z93.1 GASTROSTOMY STATUS: Chronic | ICD-10-CM

## 2024-12-10 DIAGNOSIS — Z98.890 OTHER SPECIFIED POSTPROCEDURAL STATES: Chronic | ICD-10-CM

## 2024-12-10 DIAGNOSIS — E87.5 HYPERKALEMIA: ICD-10-CM

## 2024-12-10 DIAGNOSIS — R53.2 FUNCTIONAL QUADRIPLEGIA: ICD-10-CM

## 2024-12-10 DIAGNOSIS — E83.39 OTHER DISORDERS OF PHOSPHORUS METABOLISM: ICD-10-CM

## 2024-12-10 DIAGNOSIS — B37.49 OTHER UROGENITAL CANDIDIASIS: ICD-10-CM

## 2024-12-10 DIAGNOSIS — L03.311 CELLULITIS OF ABDOMINAL WALL: ICD-10-CM

## 2024-12-10 DIAGNOSIS — N10 ACUTE PYELONEPHRITIS: ICD-10-CM

## 2024-12-10 DIAGNOSIS — J96.01 ACUTE RESPIRATORY FAILURE WITH HYPOXIA: ICD-10-CM

## 2024-12-10 DIAGNOSIS — A41.9 SEPSIS, UNSPECIFIED ORGANISM: ICD-10-CM

## 2024-12-10 DIAGNOSIS — Z79.01 LONG TERM (CURRENT) USE OF ANTICOAGULANTS: ICD-10-CM

## 2024-12-10 DIAGNOSIS — E87.0 HYPEROSMOLALITY AND HYPERNATREMIA: ICD-10-CM

## 2024-12-10 DIAGNOSIS — E86.0 DEHYDRATION: ICD-10-CM

## 2024-12-10 DIAGNOSIS — Z74.01 BED CONFINEMENT STATUS: ICD-10-CM

## 2024-12-10 DIAGNOSIS — K94.22 GASTROSTOMY INFECTION: ICD-10-CM

## 2024-12-10 DIAGNOSIS — J90 PLEURAL EFFUSION, NOT ELSEWHERE CLASSIFIED: ICD-10-CM

## 2024-12-10 DIAGNOSIS — K52.9 NONINFECTIVE GASTROENTERITIS AND COLITIS, UNSPECIFIED: ICD-10-CM

## 2024-12-10 DIAGNOSIS — I12.9 HYPERTENSIVE CHRONIC KIDNEY DISEASE WITH STAGE 1 THROUGH STAGE 4 CHRONIC KIDNEY DISEASE, OR UNSPECIFIED CHRONIC KIDNEY DISEASE: ICD-10-CM

## 2024-12-10 DIAGNOSIS — E11.22 TYPE 2 DIABETES MELLITUS WITH DIABETIC CHRONIC KIDNEY DISEASE: ICD-10-CM

## 2024-12-10 DIAGNOSIS — J96.02 ACUTE RESPIRATORY FAILURE WITH HYPERCAPNIA: ICD-10-CM

## 2024-12-10 DIAGNOSIS — G40.909 EPILEPSY, UNSPECIFIED, NOT INTRACTABLE, WITHOUT STATUS EPILEPTICUS: ICD-10-CM

## 2024-12-10 DIAGNOSIS — D63.8 ANEMIA IN OTHER CHRONIC DISEASES CLASSIFIED ELSEWHERE: ICD-10-CM

## 2024-12-10 DIAGNOSIS — D84.81 IMMUNODEFICIENCY DUE TO CONDITIONS CLASSIFIED ELSEWHERE: ICD-10-CM

## 2024-12-10 DIAGNOSIS — Z11.52 ENCOUNTER FOR SCREENING FOR COVID-19: ICD-10-CM

## 2024-12-10 DIAGNOSIS — N18.2 CHRONIC KIDNEY DISEASE, STAGE 2 (MILD): ICD-10-CM

## 2024-12-10 DIAGNOSIS — E87.20 ACIDOSIS, UNSPECIFIED: ICD-10-CM

## 2024-12-10 DIAGNOSIS — J98.19 OTHER PULMONARY COLLAPSE: ICD-10-CM

## 2024-12-10 DIAGNOSIS — D46.9 MYELODYSPLASTIC SYNDROME, UNSPECIFIED: ICD-10-CM

## 2024-12-10 DIAGNOSIS — G93.41 METABOLIC ENCEPHALOPATHY: ICD-10-CM

## 2024-12-10 DIAGNOSIS — E11.65 TYPE 2 DIABETES MELLITUS WITH HYPERGLYCEMIA: ICD-10-CM

## 2024-12-10 DIAGNOSIS — Z79.84 LONG TERM (CURRENT) USE OF ORAL HYPOGLYCEMIC DRUGS: ICD-10-CM

## 2024-12-10 LAB
ALBUMIN SERPL ELPH-MCNC: 3.3 G/DL — LOW (ref 3.5–5.2)
ALP SERPL-CCNC: 97 U/L — SIGNIFICANT CHANGE UP (ref 30–115)
ALT FLD-CCNC: 25 U/L — SIGNIFICANT CHANGE UP (ref 0–41)
ANION GAP SERPL CALC-SCNC: 12 MMOL/L — SIGNIFICANT CHANGE UP (ref 7–14)
ANISOCYTOSIS BLD QL: SLIGHT — SIGNIFICANT CHANGE UP
APPEARANCE UR: CLEAR — SIGNIFICANT CHANGE UP
AST SERPL-CCNC: 36 U/L — SIGNIFICANT CHANGE UP (ref 0–41)
BASOPHILS # BLD AUTO: 0.14 K/UL — SIGNIFICANT CHANGE UP (ref 0–0.2)
BASOPHILS NFR BLD AUTO: 0.9 % — SIGNIFICANT CHANGE UP (ref 0–1)
BILIRUB SERPL-MCNC: 0.3 MG/DL — SIGNIFICANT CHANGE UP (ref 0.2–1.2)
BILIRUB UR-MCNC: NEGATIVE — SIGNIFICANT CHANGE UP
BUN SERPL-MCNC: 55 MG/DL — HIGH (ref 10–20)
C DIFF GDH STL QL: NEGATIVE — SIGNIFICANT CHANGE UP
C DIFF GDH STL QL: SIGNIFICANT CHANGE UP
CALCIUM SERPL-MCNC: 9.6 MG/DL — SIGNIFICANT CHANGE UP (ref 8.4–10.5)
CHLORIDE SERPL-SCNC: 115 MMOL/L — HIGH (ref 98–110)
CO2 SERPL-SCNC: 26 MMOL/L — SIGNIFICANT CHANGE UP (ref 17–32)
COLOR SPEC: YELLOW — SIGNIFICANT CHANGE UP
CREAT SERPL-MCNC: 1 MG/DL — SIGNIFICANT CHANGE UP (ref 0.7–1.5)
DIFF PNL FLD: ABNORMAL
EGFR: 58 ML/MIN/1.73M2 — LOW
EOSINOPHIL # BLD AUTO: 1.28 K/UL — HIGH (ref 0–0.7)
EOSINOPHIL NFR BLD AUTO: 8.2 % — HIGH (ref 0–8)
FLUAV AG NPH QL: SIGNIFICANT CHANGE UP
FLUBV AG NPH QL: SIGNIFICANT CHANGE UP
GAS PNL BLDV: SIGNIFICANT CHANGE UP
GIANT PLATELETS BLD QL SMEAR: PRESENT — SIGNIFICANT CHANGE UP
GLUCOSE SERPL-MCNC: 93 MG/DL — SIGNIFICANT CHANGE UP (ref 70–99)
GLUCOSE UR QL: NEGATIVE MG/DL — SIGNIFICANT CHANGE UP
HCT VFR BLD CALC: 17.1 % — LOW (ref 37–47)
HGB BLD-MCNC: 5.2 G/DL — CRITICAL LOW (ref 12–16)
INR BLD: 1.06 RATIO — SIGNIFICANT CHANGE UP (ref 0.65–1.3)
KETONES UR-MCNC: NEGATIVE MG/DL — SIGNIFICANT CHANGE UP
LEUKOCYTE ESTERASE UR-ACNC: ABNORMAL
LYMPHOCYTES # BLD AUTO: 1.7 K/UL — SIGNIFICANT CHANGE UP (ref 1.2–3.4)
LYMPHOCYTES # BLD AUTO: 10.9 % — LOW (ref 20.5–51.1)
MAGNESIUM SERPL-MCNC: 2 MG/DL — SIGNIFICANT CHANGE UP (ref 1.8–2.4)
MANUAL SMEAR VERIFICATION: SIGNIFICANT CHANGE UP
MCHC RBC-ENTMCNC: 29.5 PG — SIGNIFICANT CHANGE UP (ref 27–31)
MCHC RBC-ENTMCNC: 30.4 G/DL — LOW (ref 32–37)
MCV RBC AUTO: 97.2 FL — SIGNIFICANT CHANGE UP (ref 81–99)
MICROCYTES BLD QL: SLIGHT — SIGNIFICANT CHANGE UP
MONOCYTES # BLD AUTO: 0.86 K/UL — HIGH (ref 0.1–0.6)
MONOCYTES NFR BLD AUTO: 5.5 % — SIGNIFICANT CHANGE UP (ref 1.7–9.3)
NEUTROPHILS # BLD AUTO: 11.5 K/UL — HIGH (ref 1.4–6.5)
NEUTROPHILS NFR BLD AUTO: 73.6 % — SIGNIFICANT CHANGE UP (ref 42.2–75.2)
NITRITE UR-MCNC: NEGATIVE — SIGNIFICANT CHANGE UP
PH UR: 6 — SIGNIFICANT CHANGE UP (ref 5–8)
PLAT MORPH BLD: NORMAL — SIGNIFICANT CHANGE UP
PLATELET # BLD AUTO: 341 K/UL — SIGNIFICANT CHANGE UP (ref 130–400)
PMV BLD: 12 FL — HIGH (ref 7.4–10.4)
POIKILOCYTOSIS BLD QL AUTO: SLIGHT — SIGNIFICANT CHANGE UP
POLYCHROMASIA BLD QL SMEAR: SLIGHT — SIGNIFICANT CHANGE UP
POTASSIUM SERPL-MCNC: 4.7 MMOL/L — SIGNIFICANT CHANGE UP (ref 3.5–5)
POTASSIUM SERPL-SCNC: 4.7 MMOL/L — SIGNIFICANT CHANGE UP (ref 3.5–5)
PROT SERPL-MCNC: 5.3 G/DL — LOW (ref 6–8)
PROT UR-MCNC: 30 MG/DL
PROTHROM AB SERPL-ACNC: 12.5 SEC — SIGNIFICANT CHANGE UP (ref 9.95–12.87)
RBC # BLD: 1.76 M/UL — LOW (ref 4.2–5.4)
RBC # FLD: 16 % — HIGH (ref 11.5–14.5)
RBC BLD AUTO: ABNORMAL
RSV RNA NPH QL NAA+NON-PROBE: SIGNIFICANT CHANGE UP
SARS-COV-2 RNA SPEC QL NAA+PROBE: SIGNIFICANT CHANGE UP
SODIUM SERPL-SCNC: 153 MMOL/L — HIGH (ref 135–146)
SP GR SPEC: 1.02 — SIGNIFICANT CHANGE UP (ref 1–1.03)
UROBILINOGEN FLD QL: 0.2 MG/DL — SIGNIFICANT CHANGE UP (ref 0.2–1)
VARIANT LYMPHS # BLD: 0.9 % — SIGNIFICANT CHANGE UP (ref 0–5)
WBC # BLD: 15.63 K/UL — HIGH (ref 4.8–10.8)
WBC # FLD AUTO: 15.63 K/UL — HIGH (ref 4.8–10.8)

## 2024-12-10 PROCEDURE — 83986 ASSAY PH BODY FLUID NOS: CPT

## 2024-12-10 PROCEDURE — 82042 OTHER SOURCE ALBUMIN QUAN EA: CPT

## 2024-12-10 PROCEDURE — 82962 GLUCOSE BLOOD TEST: CPT

## 2024-12-10 PROCEDURE — 84157 ASSAY OF PROTEIN OTHER: CPT

## 2024-12-10 PROCEDURE — 87324 CLOSTRIDIUM AG IA: CPT

## 2024-12-10 PROCEDURE — 71045 X-RAY EXAM CHEST 1 VIEW: CPT | Mod: 26

## 2024-12-10 PROCEDURE — 80053 COMPREHEN METABOLIC PANEL: CPT

## 2024-12-10 PROCEDURE — 87070 CULTURE OTHR SPECIMN AEROBIC: CPT

## 2024-12-10 PROCEDURE — 80048 BASIC METABOLIC PNL TOTAL CA: CPT

## 2024-12-10 PROCEDURE — 82945 GLUCOSE OTHER FLUID: CPT

## 2024-12-10 PROCEDURE — 99291 CRITICAL CARE FIRST HOUR: CPT | Mod: GC

## 2024-12-10 PROCEDURE — 87015 SPECIMEN INFECT AGNT CONCNTJ: CPT

## 2024-12-10 PROCEDURE — 85730 THROMBOPLASTIN TIME PARTIAL: CPT

## 2024-12-10 PROCEDURE — 87641 MR-STAPH DNA AMP PROBE: CPT

## 2024-12-10 PROCEDURE — 36430 TRANSFUSION BLD/BLD COMPNT: CPT

## 2024-12-10 PROCEDURE — 70450 CT HEAD/BRAIN W/O DYE: CPT | Mod: 26,MC

## 2024-12-10 PROCEDURE — 87640 STAPH A DNA AMP PROBE: CPT

## 2024-12-10 PROCEDURE — 84100 ASSAY OF PHOSPHORUS: CPT

## 2024-12-10 PROCEDURE — 87205 SMEAR GRAM STAIN: CPT

## 2024-12-10 PROCEDURE — 93970 EXTREMITY STUDY: CPT

## 2024-12-10 PROCEDURE — 83036 HEMOGLOBIN GLYCOSYLATED A1C: CPT

## 2024-12-10 PROCEDURE — 87102 FUNGUS ISOLATION CULTURE: CPT

## 2024-12-10 PROCEDURE — 88305 TISSUE EXAM BY PATHOLOGIST: CPT

## 2024-12-10 PROCEDURE — C9254: CPT

## 2024-12-10 PROCEDURE — 85025 COMPLETE CBC W/AUTO DIFF WBC: CPT

## 2024-12-10 PROCEDURE — P9040: CPT

## 2024-12-10 PROCEDURE — 85610 PROTHROMBIN TIME: CPT

## 2024-12-10 PROCEDURE — 74177 CT ABD & PELVIS W/CONTRAST: CPT | Mod: 26,MC

## 2024-12-10 PROCEDURE — 87449 NOS EACH ORGANISM AG IA: CPT

## 2024-12-10 PROCEDURE — 80202 ASSAY OF VANCOMYCIN: CPT

## 2024-12-10 PROCEDURE — 83615 LACTATE (LD) (LDH) ENZYME: CPT

## 2024-12-10 PROCEDURE — 71045 X-RAY EXAM CHEST 1 VIEW: CPT

## 2024-12-10 PROCEDURE — 88112 CYTOPATH CELL ENHANCE TECH: CPT

## 2024-12-10 PROCEDURE — 71260 CT THORAX DX C+: CPT | Mod: 26,MC

## 2024-12-10 PROCEDURE — 89051 BODY FLUID CELL COUNT: CPT

## 2024-12-10 PROCEDURE — 93010 ELECTROCARDIOGRAM REPORT: CPT

## 2024-12-10 PROCEDURE — 71275 CT ANGIOGRAPHY CHEST: CPT | Mod: MC

## 2024-12-10 PROCEDURE — 85379 FIBRIN DEGRADATION QUANT: CPT

## 2024-12-10 PROCEDURE — 99223 1ST HOSP IP/OBS HIGH 75: CPT

## 2024-12-10 PROCEDURE — 87177 OVA AND PARASITES SMEARS: CPT

## 2024-12-10 PROCEDURE — 83735 ASSAY OF MAGNESIUM: CPT

## 2024-12-10 PROCEDURE — 36415 COLL VENOUS BLD VENIPUNCTURE: CPT

## 2024-12-10 PROCEDURE — 87075 CULTR BACTERIA EXCEPT BLOOD: CPT

## 2024-12-10 RX ORDER — PYRIDOXINE HCL (VITAMIN B6) 25 MG
100 TABLET ORAL DAILY
Refills: 0 | Status: DISCONTINUED | OUTPATIENT
Start: 2024-12-10 | End: 2024-12-12

## 2024-12-10 RX ORDER — METRONIDAZOLE 500 MG/1
500 TABLET ORAL ONCE
Refills: 0 | Status: COMPLETED | OUTPATIENT
Start: 2024-12-10 | End: 2024-12-10

## 2024-12-10 RX ORDER — SENNOSIDES 8.6 MG
2 TABLET ORAL AT BEDTIME
Refills: 0 | Status: DISCONTINUED | OUTPATIENT
Start: 2024-12-10 | End: 2024-12-11

## 2024-12-10 RX ORDER — POLYETHYLENE GLYCOL 3350 17 G/17G
17 POWDER, FOR SOLUTION ORAL DAILY
Refills: 0 | Status: DISCONTINUED | OUTPATIENT
Start: 2024-12-10 | End: 2024-12-11

## 2024-12-10 RX ORDER — 0.9 % SODIUM CHLORIDE 0.9 %
1000 INTRAVENOUS SOLUTION INTRAVENOUS
Refills: 0 | Status: DISCONTINUED | OUTPATIENT
Start: 2024-12-10 | End: 2024-12-11

## 2024-12-10 RX ORDER — AMANTADINE HCL 100 MG
50 CAPSULE ORAL
Refills: 0 | Status: DISCONTINUED | OUTPATIENT
Start: 2024-12-10 | End: 2024-12-12

## 2024-12-10 RX ORDER — AMLODIPINE BESYLATE 10 MG/1
10 TABLET ORAL DAILY
Refills: 0 | Status: DISCONTINUED | OUTPATIENT
Start: 2024-12-10 | End: 2024-12-10

## 2024-12-10 RX ORDER — CEFEPIME 2 G/1
1000 INJECTION, POWDER, FOR SOLUTION INTRAVENOUS EVERY 12 HOURS
Refills: 0 | Status: DISCONTINUED | OUTPATIENT
Start: 2024-12-10 | End: 2024-12-16

## 2024-12-10 RX ORDER — LEVETIRACETAM 1000 MG/1
1500 TABLET ORAL EVERY 12 HOURS
Refills: 0 | Status: DISCONTINUED | OUTPATIENT
Start: 2024-12-10 | End: 2024-12-16

## 2024-12-10 RX ORDER — 0.9 % SODIUM CHLORIDE 0.9 %
1000 INTRAVENOUS SOLUTION INTRAVENOUS ONCE
Refills: 0 | Status: COMPLETED | OUTPATIENT
Start: 2024-12-10 | End: 2024-12-10

## 2024-12-10 RX ORDER — PETROLATUM 960 MG/G
1 OINTMENT TOPICAL
Refills: 0 | Status: DISCONTINUED | OUTPATIENT
Start: 2024-12-10 | End: 2024-12-12

## 2024-12-10 RX ORDER — METRONIDAZOLE 500 MG/1
500 TABLET ORAL EVERY 8 HOURS
Refills: 0 | Status: DISCONTINUED | OUTPATIENT
Start: 2024-12-10 | End: 2024-12-12

## 2024-12-10 RX ORDER — LABETALOL 100 MG/1
200 TABLET, FILM COATED ORAL EVERY 8 HOURS
Refills: 0 | Status: DISCONTINUED | OUTPATIENT
Start: 2024-12-10 | End: 2024-12-10

## 2024-12-10 RX ORDER — PANTOPRAZOLE SODIUM 40 MG/1
40 TABLET, DELAYED RELEASE ORAL
Refills: 0 | Status: DISCONTINUED | OUTPATIENT
Start: 2024-12-10 | End: 2024-12-11

## 2024-12-10 RX ORDER — VANCOMYCIN HCL 900 MCG/MG
1000 POWDER (GRAM) MISCELLANEOUS ONCE
Refills: 0 | Status: COMPLETED | OUTPATIENT
Start: 2024-12-10 | End: 2024-12-10

## 2024-12-10 RX ORDER — VANCOMYCIN HCL 900 MCG/MG
1000 POWDER (GRAM) MISCELLANEOUS EVERY 24 HOURS
Refills: 0 | Status: DISCONTINUED | OUTPATIENT
Start: 2024-12-10 | End: 2024-12-16

## 2024-12-10 RX ORDER — CEFEPIME 2 G/1
1000 INJECTION, POWDER, FOR SOLUTION INTRAVENOUS ONCE
Refills: 0 | Status: COMPLETED | OUTPATIENT
Start: 2024-12-10 | End: 2024-12-10

## 2024-12-10 RX ORDER — HYDRALAZINE HYDROCHLORIDE 10 MG/1
25 TABLET ORAL EVERY 8 HOURS
Refills: 0 | Status: DISCONTINUED | OUTPATIENT
Start: 2024-12-10 | End: 2024-12-10

## 2024-12-10 RX ORDER — LISINOPRIL 20 MG/1
10 TABLET ORAL DAILY
Refills: 0 | Status: DISCONTINUED | OUTPATIENT
Start: 2024-12-10 | End: 2024-12-10

## 2024-12-10 RX ORDER — LACOSAMIDE 10 MG/ML
200 INJECTION INTRAVENOUS EVERY 12 HOURS
Refills: 0 | Status: DISCONTINUED | OUTPATIENT
Start: 2024-12-10 | End: 2024-12-16

## 2024-12-10 RX ADMIN — CEFEPIME 100 MILLIGRAM(S): 2 INJECTION, POWDER, FOR SOLUTION INTRAVENOUS at 15:33

## 2024-12-10 RX ADMIN — Medication 250 MILLIGRAM(S): at 16:21

## 2024-12-10 RX ADMIN — Medication 1000 MILLILITER(S): at 14:42

## 2024-12-10 RX ADMIN — Medication 1000 MILLILITER(S): at 15:42

## 2024-12-10 RX ADMIN — PETROLATUM 1 APPLICATION(S): 960 OINTMENT TOPICAL at 22:20

## 2024-12-10 NOTE — ED ADULT NURSE NOTE - NSICDXPASTMEDICALHX_GEN_ALL_CORE_FT
PAST MEDICAL HISTORY:  Chronic kidney disease (CKD)     DM (diabetes mellitus)     MDS (myelodysplastic syndrome)     SDH (subdural hematoma)     Seizure disorder

## 2024-12-10 NOTE — ED ADULT NURSE NOTE - NSFALLHARMRISKINTERV_ED_ALL_ED

## 2024-12-10 NOTE — H&P ADULT - HISTORY OF PRESENT ILLNESS
A 79-year-old female with a complex medical history including myelodysplastic syndrome (MDS) followed by Dr. Wade (requiring periodic transfusions for anemia), diabetes mellitus, hypertension, chronic kidney disease, and multiple recent admissions for a right-sided subdural hematoma (SDH) with mass effect and midline shift (status-post evacuation and middle meningeal artery embolization complicated by a leaking craniotomy site and concern for status epilepticus) presented to the ED from her skilled nursing facility (SNF) due to lethargy and fever.  She is nonverbal, bedbound, and has a PEG tube.  She also has a recent history of *Enterococcus faecalis* urinary tract infection.    The SNF staff reported that the patient has been experiencing spiking fevers for the past 10 days.  A chest x-ray obtained at the SNF demonstrated infiltrates, prompting initiation of vancomycin for suspected pneumonia.  Her white blood cell count was elevated at 18,000/µL.  Urinalysis and urine culture were negative, as were blood cultures.  The patient has persistent anemia despite receiving weekly darbepoetin zachery (Aranesp) injections three times a week, with the last dose administered on Monday.  Her most recent hemoglobin level was 5.8 g/dL, prompting the SNF to contact the [facility] for a possible transfusion.  However, due to concerns about the patient's current infectious picture, the transfusion was deferred, and the patient was sent to the ED for further evaluation.  On presentation to the ED, the patient was lethargic.  denied nausea, vomiting, diarrhea, abdominal pain, chest pain, or sick contacts.    Physical examination revealed multiple potential sources of infection, including a right upper extremity midline, a right internal jugular port-a-cath, and a stage 3 sacral decubitus ulcer.    In Ed on vitals :  BP Systolic 164/67 mm Hg   Heart Rate	 105 /min , afebrile on 3L of NC     On labs WBC 15 .63 , Hb 5.2, eosinophil of 1.28, NA of 153 , UA positive e yeast     CXray showed B/L opacities and effusions     CTAP 1.  Large bilateral pleural effusions with adjacent compressive   atelectasis resulting in near complete left and complete right lower lobe   collapse.  2.  Mild edematous thickening of the colon with mucosal hyperemia can be   seen with colitis of infectious or inflammatory etiology. No bowel   obstruction.  3.  Trace ascites. Anasarca.    CTH : 1.  The right frontal convexity extra-axial collection has decreased in   size and density.    2.  The left frontal convexity isodense subdural collection has not   significantly changed.    In ed rcvd cefe , flagyl and vanco, 2l of LR      Pending : 2 packs of red blood cells has been ordered     admitted to SDU for further workup .      A 79-year-old female with a complex medical history including myelodysplastic syndrome (MDS) followed by Dr. Wade (requiring periodic transfusions for anemia), diabetes mellitus, hypertension, chronic kidney disease, and multiple recent admissions for a right-sided subdural hematoma (SDH) with mass effect and midline shift (status-post evacuation and middle meningeal artery embolization complicated by a leaking craniotomy site and concern for status epilepticus) presented to the ED from her skilled nursing facility (SNF) due to lethargy and fever.  She is nonverbal, bedbound, and has a PEG tube.  She also has a recent history of *Enterococcus faecalis* urinary tract infection.    The SNF staff reported that the patient has been experiencing spiking fevers for the past 10 days.  A chest x-ray obtained at the SNF demonstrated infiltrates, prompting initiation of vancomycin for suspected pneumonia.  Her white blood cell count was elevated at 18,000/µL.  Urinalysis and urine culture were negative, as were blood cultures.  The patient has persistent anemia despite receiving weekly darbepoetin zachery (Aranesp) injections three times a week, with the last dose administered on Monday.  Her most recent hemoglobin level was 5.8 g/dL, prompting the SNF to contact the [facility] for a possible transfusion.  However, due to concerns about the patient's current infectious picture, the transfusion was deferred, and the patient was sent to the ED for further evaluation.  On presentation to the ED, the patient was lethargic.  Daughter on bedside also confirmed Diarrhea   denied nausea, vomiting, diarrhea, abdominal pain, chest pain, or sick contacts.    Physical examination revealed multiple potential sources of infection, including a right upper extremity midline, a right internal jugular port-a-cath, and a stage 3 sacral decubitus ulcer.    In Ed on vitals :  BP Systolic 164/67 mm Hg   Heart Rate	 105 /min , afebrile on 3L of NC     On labs WBC 15 .63 , Hb 5.2, eosinophil of 1.28, NA of 153 , UA positive e yeast     CXray showed B/L opacities and effusions     CTAP 1.  Large bilateral pleural effusions with adjacent compressive   atelectasis resulting in near complete left and complete right lower lobe   collapse.  2.  Mild edematous thickening of the colon with mucosal hyperemia can be   seen with colitis of infectious or inflammatory etiology. No bowel   obstruction.  3.  Trace ascites. Anasarca.    CTH : 1.  The right frontal convexity extra-axial collection has decreased in   size and density.    2.  The left frontal convexity isodense subdural collection has not   significantly changed.    In ed rcvd cefe , flagyl and vanco, 2l of LR      Pending : 2 packs of red blood cells has been ordered     admitted to SDU for further workup .

## 2024-12-10 NOTE — ED ADULT TRIAGE NOTE - HOW PATIENT ADDRESSED, PROFILE
----- Message from La Nena Aguilar MD sent at 11/16/2021 12:39 PM CST -----  Have her set up a telephone encounter for today for this to be treated  
SIMONA

## 2024-12-10 NOTE — ED PROVIDER NOTE - ATTENDING CONTRIBUTION TO CARE
78 yo F with hx of anemia 2/2 MDS, CKD, DM, SDH s/p evacuation and MMA embolization, seizure on keppra, HTN who was BIBEMS from [facility] for anemia done on labs. Per NH, pt is currently being tx'ed for sepsis for past 10 days with vancomycin. Has LUE midline in place but continues to be febrile despite negative blood cx. Had labs drawn today showing hgb 5.8 and WBC 18. *** 78 yo F with hx of anemia 2/2 MDS, CKD, DM, SDH s/p evacuation and MMA embolization, seizure on keppra, HTN who was BIBEMS from [facility] for anemia done on labs. Per NH, pt is currently being tx'ed for sepsis for past 10 days with vancomycin. Has LUE midline in place but continues to be febrile despite negative blood cx. Had labs drawn today showing hgb 5.8 and WBC 18 so sent to ED. Pt unable to provide any hx as she is nonverbal. Was admitted last mo for SDH s/p evacuation and MMA embolization. Has known anemia 2/2 MDS and was transfused during prior admission.    CONSTITUTIONAL: chronically ill appearing, toxic appearing, in no acute distress  SKIN: warm, dry, stage 2 sacral ulcer, cap refill < 2 seconds  HEENT: normocephalic, atraumatic, no conjunctival erythema, dry mucous membranes, patent airway  NECK: supple  CV:  tachycardic rate, 2+ radial pulses bilaterally  RESP: no wheezes, no rales, no rhonchi, normal work of breathing  ABD: soft, no tenderness, nondistended, no rebound, no guarding, PEG site erythematous with discharge  MSK: no cyanosis, no edema  NEURO: awake, does not follow commands  PSYCH: unable to assess    A&P:  *** 80 yo F with hx of anemia 2/2 MDS, CKD, DM, SDH s/p evacuation and MMA embolization, seizure on keppra, HTN who was BIBEMS from [facility] for anemia done on labs. Per NH, pt is currently being tx'ed for sepsis for past 10 days with vancomycin. Has LUE midline in place but continues to be febrile despite negative blood cx. Had labs drawn today showing hgb 5.8 and WBC 18 so sent to ED. Given IV tylenol en route to ED. Pt unable to provide any hx as she is nonverbal. Was admitted last mo for SDH s/p evacuation and MMA embolization. Has known anemia 2/2 MDS and was transfused during prior admission.    CONSTITUTIONAL: chronically ill appearing, toxic appearing, in no acute distress  SKIN: warm, dry, stage 2 sacral ulcer, cap refill < 2 seconds  HEENT: normocephalic, atraumatic, no conjunctival erythema, dry mucous membranes, patent airway  NECK: supple  CV:  tachycardic rate, 2+ radial pulses bilaterally  RESP: no wheezes, no rales, no rhonchi, normal work of breathing  ABD: soft, no tenderness, nondistended, no rebound, no guarding, PEG site erythematous with discharge  RECTAL: no blood per rectum  MSK: no cyanosis, no edema  NEURO: awake, does not follow commands, moves extremities equally  PSYCH: unable to assess    A&P:  Pt here with anemia in setting of chronic anemia 2/2 MDS and currently being tx'ed for sepsis. Here in ED, HR 100s, rectal temp negative. KAREN negative for blood. PEG site appears infected and pt tachypneic on exam. Plan for labs, ekg, imaging r/o sepsis, pneumonia, viral URI, UTI, infected PEG, anemia.

## 2024-12-10 NOTE — ED PROVIDER NOTE - PROGRESS NOTE DETAILS
TC: Elyssa 5.9. Obtained verbal consent from daughter Jose Kennedy [phone] over phone for [facility] TC: Hgb 5.9. Obtained verbal consent from daughter Jose Kennedy [phone] over phone for [facility]

## 2024-12-10 NOTE — ED PROVIDER NOTE - OBJECTIVE STATEMENT
80 yo F PMHx MDS (follows w/ Dr Wade, requires periodic transfusions for anemia), DM, HTN, CKD, multiple recent admission for R hemispheric SDH w/ mass effect+midline shift s/p evacuation, MMA embolization, complicated by leaking from crani site and concern for status epilepticus (from S[facility] (PEG, nonverbal, bedbound at baseline)), recent E. faecalis UTI brought in from [facility] due to abnormal lab value.     Per [facility], patient has been treated for "sepsis," for the past 10 days with IV vancomycin. Per them, patient was spiking fevers, had WBC 18k, UA/UCx (-), BCx(-), CXR showing infiltrates so they were treating for suspected pulmonary source. Patient has been having low Hgb, they give her retacrit three times weekly (last monday) however this has not been improving her Hgb and last Hgb was 5 which is why they brought her in. They called [facility] for possible transfusion however [facility] was concerned about transfusing her given infectious picture so NH sent her to ED.  Patient has a LUE midline and stage 3 sacral ulcer on presentation, no ocampo, +PEG  On my exam, patient awakens to painful stimuli, does not follow commands, does not track (appears consistent with last documented baseline on recent admission), non-verbal 78 yo F PMHx MDS (follows w/ Dr Wade, requires periodic transfusions for anemia), DM, HTN, CKD, multiple recent admission for R hemispheric SDH w/ mass effect+midline shift s/p evacuation, MMA embolization, complicated by leaking from crani site and concern for status epilepticus (from S[facility] (PEG, nonverbal, bedbound at baseline)), recent E. faecalis UTI brought in from [facility] due to abnormal lab value.     Per [facility], patient has been treated for "sepsis," for the past 10 days with IV vancomycin. Per them, patient was spiking fevers, had WBC 18k, UA/UCx (-), BCx(-), CXR showing infiltrates so they were treating for suspected pulmonary source. Of note, patient came in with IV tylenol running. Patient has been having low Hgb, they give her retacrit three times weekly (last monday) however this has not been improving her Hgb and last Hgb was 5.8 which is why they brought her in. They called [facility] for possible transfusion however [facility] was concerned about transfusing her given infectious picture so NH sent her to ED.  Patient has a LUE midline and stage 3 sacral ulcer on presentation, no ocampo, +PEG  On my exam, patient awakens to painful stimuli, does not follow commands, does not track (appears consistent with last documented baseline on recent admission), non-verbal 78 yo F PMHx MDS (follows w/ Dr Wade, requires periodic transfusions for anemia), DM, HTN, CKD, multiple recent admission for R hemispheric SDH w/ mass effect+midline shift s/p evacuation, MMA embolization, complicated by leaking from crani site and concern for status epilepticus (from [facility] (PEG, nonverbal, bedbound at baseline)), recent E. faecalis UTI brought in from [facility] due to abnormal lab value.     Per [facility], patient has been treated for "sepsis," for the past 10 days with IV vancomycin. Per them, patient was spiking fevers, had WBC 18k, UA/UCx (-), BCx(-), CXR showing infiltrates so they were treating for suspected pulmonary source. Of note, patient came in with IV tylenol running. Patient has been having low Hgb, they give her retacrit three times weekly (last monday) however this has not been improving her Hgb and last Hgb was 5.8 which is why they brought her in. They called [facility] for possible transfusion however [facility] was concerned about transfusing her given infectious picture so NH sent her to ED.  Patient has a LUE midline, Right IJ mayito cath, and stage 3 sacral ulcer on presentation, no ocampo, +PEG  On my exam, patient awakens to painful stimuli, does not follow commands, does not track (appears consistent with last documented baseline on recent admission), non-verbal

## 2024-12-10 NOTE — ED ADULT NURSE REASSESSMENT NOTE - NS ED NURSE REASSESS COMMENT FT1
pt continues bending arm despite redirection. daughter at bedside educated about importance of keeping arm straight at this time.

## 2024-12-10 NOTE — ED PROVIDER NOTE - PHYSICAL EXAMINATION
Gen: NAD  HEENT: NCAT, crani scar, normal conjunctiva, oral mucosa moist  Lung: referred breath sounds b/l, RLL crackles, no tachypneia, on NC  CV: tachycardic, regular rhythm  Abd: soft, ND, +PEG site w/ surrounding erythema and white exudate   Neuro/MSK: opens eyes to sternal rub, no tracking, no following commands, non-verbal, bedbound   Extremities: no lower extremity edema bilaterally, cap refill <2s  Skin: +sacral ulcer, +LUE midline c/d/i Gen: NAD  HEENT: NCAT, crani scar, normal conjunctiva, oral mucosa moist  Lung: referred breath sounds b/l, RLL crackles, no tachypneia, on NC  CV: tachycardic, regular rhythm  Abd: soft, ND, +PEG site w/ surrounding erythema and white exudate, KAREN (-) for blood  Neuro/MSK: opens eyes to sternal rub, no tracking, no following commands, non-verbal, bedbound   Extremities: no lower extremity edema bilaterally, cap refill <2s  Skin: +sacral ulcer stage 3, +LUE midline c/d/i, R chest port site c/d/i

## 2024-12-10 NOTE — ED PROVIDER NOTE - NS ED MD DISPO ISOLATION TYPES
Routing refill request to provider for review/approval because:  Labs out of range:    Lab Results   Component Value Date    WBC 6.5 01/28/2017     Lab Results   Component Value Date    RBC 4.82 01/28/2017     Lab Results   Component Value Date    HGB 14.3 01/28/2017     Lab Results   Component Value Date    HCT 42.8 01/28/2017     Lab Results   Component Value Date    MCV 89 01/28/2017     Lab Results   Component Value Date    MCH 29.7 01/28/2017     Lab Results   Component Value Date    MCHC 33.4 01/28/2017     Lab Results   Component Value Date    RDW 13.1 01/28/2017     Lab Results   Component Value Date     01/28/2017       BP Readings from Last 6 Encounters:   11/02/20 (!) 146/82   08/14/20 135/88   01/02/20 132/83   11/08/19 131/83   10/01/19 131/87   06/18/19 138/79     Alondra Tariq RN            None

## 2024-12-10 NOTE — H&P ADULT - NSHPPHYSICALEXAM_GEN_ALL_CORE
Gen: NAD  	HEENT: NCAT, crani scar, normal conjunctiva, oral mucosa moist  	Lung: referred breath sounds b/l, RLL crackles, no tachypneia, on NC  	CV: tachycardic, regular rhythm  	Abd: soft, ND, +PEG site w/ surrounding erythema and white exudate, KAREN (-) for blood  	Neuro/MSK: opens eyes to sternal rub, no tracking, no following commands, non-verbal, bedbound   	Extremities: no lower extremity edema bilaterally, cap refill <2s  Skin: +sacral ulcer stage 3, +LUE midline c/d/i, R chest port site c/d/i Gen: not in any distress   HEENT: NCAT  Lung: decreased breathe sounds b/l more dec on R side   CV:  regular rhythm, S1 , s2   Abd: soft, with PEG site with surrounding erythema and  discharge   Neuro: non-verbal, bedbound   Extremities: no edema   Skin: RUE midline and R chest port , and a stage # sacral ulcer

## 2024-12-10 NOTE — H&P ADULT - ASSESSMENT
80 yo F PMHx MDS (follows w/ Dr Wade, requires periodic transfusions for anemia), DM, HTN, CKD, multiple recent admission for R hemispheric SDH w/ mass effect+midline shift s/p evacuation, MMA embolization, complicated by leaking from crani site and concern for status epilepticus (from SICC (PEG, nonverbal, bedbound at baseline)), recent E. faecalis UTI brought in from [facility] as the staff in [facility] found her being lethargic with fever. Staff in [facility]  did a Chest xray showing infiltartes and started her on Vancomycin drip suspecting PNA from last 10 days.  Per them, patient was spiking fevers, had WBC 18k, UA/UCx (-), BCx(-)  Patient has been having low Hgb, they give her retacrit three times weekly (last monday) however this has not been improving her Hgb and last Hgb was 5.8 which is why they brought her in. They called [facility] for possible transfusion however [facility] was concerned about transfusing her given infectious picture so NH sent her to ED.  Denies any N/V , diarrhea , abdominal or chest pain or sick contact.         #Sepsis POA   -Multiple sources of infection i.e RUE midline, Right IJ mayito cath,  UTI (Hx of UTI with E faecalis)  Stage 3 sacral ulcer on presentation, +s/p PEG on 11/12 looks infected   and colitis   - BP Systolic 164/67 mm Hg   Heart Rate	 105 /min , afebrile on 3L of NC   -WBC 15 .63 , Hb 5.2, eosinophil of 1.28, NA of 153 , UA positive e yeast   -CXray showed B/L opacities and effusions   -CTAP 1.  Large bilateral pleural effusions with adjacent compressive   atelectasis resulting in near complete left and complete right lower lobe   collapse.  2.  Mild edematous thickening of the colon with mucosal hyperemia can be   seen with colitis of infectious or inflammatory etiology. No bowel   obstruction.  3.  Trace ascites. Anasarca.  -In ed rcvd cefe , flagyl and vanco, 2l of LR  and ocampo was placed in Ed (was not on it before)   PLAN :   -on IVF : LR @ 75ml/hr for 24 hours   -c/w cefepime , vanc and flagyl   -f/u infectious workup ie BCx , UCx , once the cultures are back and   are positive can consider removing line as possible site of infection   -f/u pulm crit team for pleural effusion possible tap  -chest physio   -wean oxygen as tolerated   -f/u abdominal US for ascites   -F/u burn for sacral and peg site infection   -f/u wound care consult   -f/u repeat labs in am   -f/u repeat Cxray in am     #MDS with Anemia  -Hb Hb 5.2  -ordered 2 pint on pRBCs   plan ;   -f/u repeat CBC s/p transfusion  -Active TnS   - OP fu with Dr Wade     #Hypernatremia likely 2/2 to dec PO intake :   -Na 153   -on IVF   -trend BMP for sodium     #Recent admission for R hemispheric SDH w/ mass effect+midline shift s/p evacuation   MMA embolization, c/b leaking from crani site and concern for status epilepticus   -CTH : 1.  The right frontal convexity extra-axial collection has decreased in   size and density.    2.  The left frontal convexity isodense subdural collection has not   significantly changed.  -Keppra 1500 mg BID and Lacosamide 200 q12   - c/w amantadine 50mg q 12 daily       #HTN   - Echo  10/9/24: Normal global left ventricular systolic function. Left ventricular ejection fraction, by visual estimation, is 65 to 70%  - c/w lisinopril 20mg , labetalol 200mg q 8; c/w norvasc 10mg.      #CKD improving     #MISC:   holding proph Lovenox till Hb improves   resumed PEG tube feeds   on PPI   bedboud , non verbal on baseline   full code        med rec done                 80 yo F PMHx MDS (follows w/ Dr Wade, requires periodic transfusions for anemia), DM, HTN, CKD, multiple recent admission for R hemispheric SDH w/ mass effect+midline shift s/p evacuation, MMA embolization, complicated by leaking from crani site and concern for status epilepticus (from SICC (PEG, nonverbal, bedbound at baseline)), recent E. faecalis UTI brought in from [facility] as the staff in [facility] found her being lethargic with fever. Staff in [facility]  did a Chest xray showing infiltartes and started her on Vancomycin drip suspecting PNA from last 10 days.  Per them, patient was spiking fevers, had WBC 18k, UA/UCx (-), BCx(-)  Patient has been having low Hgb, they give her retacrit three times weekly (last monday) however this has not been improving her Hgb and last Hgb was 5.8 which is why they brought her in. They called [facility] for possible transfusion however [facility] was concerned about transfusing her given infectious picture so NH sent her to ED.  Denies any N/V , diarrhea , abdominal or chest pain or sick contact.         #Sepsis POA   -Multiple sources of infection i.e RUE midline, Right IJ mayito cath,  UTI (Hx of UTI with E faecalis)  Stage 3 sacral ulcer on presentation, +s/p PEG on 11/12 looks infected   and colitis   - BP Systolic 164/67 mm Hg   Heart Rate	 105 /min , afebrile on 3L of NC   -WBC 15 .63 , Hb 5.2, eosinophil of 1.28, NA of 153 , UA positive e yeast   -CXray showed B/L opacities and effusions   -CTAP 1.  Large bilateral pleural effusions with adjacent compressive   atelectasis resulting in near complete left and complete right lower lobe   collapse.  2.  Mild edematous thickening of the colon with mucosal hyperemia can be   seen with colitis of infectious or inflammatory etiology. No bowel   obstruction.  3.  Trace ascites. Anasarca.  -In ed rcvd cefe , flagyl and vanco, 2l of LR  and ocampo was placed in Ed (was not on it before)   PLAN :   -on IVF : LR @ 75ml/hr for 24 hours   -c/w cefepime , vanc and flagyl   -f/u infectious workup ie BCx , UCx , once the cultures are back and   are positive can consider removing line as possible site of infection   -f/u pulm crit team for pleural effusion possible tap  -chest physio   -wean oxygen as tolerated   -f/u abdominal US for ascites   -F/u burn for sacral and peg site infection   -f/u wound care consult   -f/u Duplex LE   -f/u D-dimers in am  -f/u repeat labs in am   -f/u repeat Cxray in am     #MDS with Anemia  -Hb Hb 5.2  -ordered 2 pint on pRBCs   plan ;   -f/u repeat CBC s/p transfusion  -Active TnS   - OP fu with Dr Wade     #Hypernatremia likely 2/2 to dec PO intake :   -Na 153   -on IVF   -trend BMP for sodium     #Recent admission for R hemispheric SDH w/ mass effect+midline shift s/p evacuation   MMA embolization, c/b leaking from crani site and concern for status epilepticus   -CTH : 1.  The right frontal convexity extra-axial collection has decreased in   size and density.    2.  The left frontal convexity isodense subdural collection has not   significantly changed.  -Keppra 1500 mg BID and Lacosamide 200 q12   - c/w amantadine 50mg q 12 daily       #HTN   - Echo  10/9/24: Normal global left ventricular systolic function. Left ventricular ejection fraction, by visual estimation, is 65 to 70%  - c/w lisinopril 20mg , labetalol 200mg q 8; c/w norvasc 10mg.      #CKD improving     #MISC:   holding proph Lovenox till Hb improves   resumed PEG tube feeds   on PPI   bedboud , non verbal on baseline   full code        med rec done                A 79-year-old female with a complex medical history including myelodysplastic syndrome (MDS) followed by Dr. Wade (requiring periodic transfusions for anemia), diabetes mellitus, hypertension, chronic kidney disease, and multiple recent admissions for a right-sided subdural hematoma (SDH) with mass effect and midline shift (status-post evacuation and middle meningeal artery embolization complicated by a leaking craniotomy site and concern for status epilepticus) presented to the ED from her skilled nursing facility (SNF) due to lethargy and fever.  She is nonverbal, bedbound, and has a PEG tube.  She also has a recent history of *Enterococcus faecalis* urinary tract infection.    The SNF staff reported that the patient has been experiencing spiking fevers for the past 10 days.  A chest x-ray obtained at the SNF demonstrated infiltrates, prompting initiation of vancomycin for suspected pneumonia.  Her white blood cell count was elevated at 18,000/µL.  Urinalysis and urine culture were negative, as were blood cultures.  The patient has persistent anemia despite receiving weekly darbepoetin zachery (Aranesp) injections three times a week, with the last dose administered on Monday.  Her most recent hemoglobin level was 5.8 g/dL, prompting the SNF to contact the [email or web address] for a possible transfusion.  However, due to concerns about the patient's current infectious picture, the transfusion was deferred, and the patient was sent to the ED for further evaluation.  On presentation to the ED, the patient was lethargic.  denied nausea, vomiting, diarrhea, abdominal pain, chest pain, or sick contacts.  .         #Sepsis POA   -Multiple sources of infection i.e RUE midline, Right IJ mayito cath,  UTI (Hx of UTI with E faecalis)  Stage 3 sacral ulcer on presentation, +s/p PEG on 11/12 looks infected   and colitis   - BP Systolic 164/67 mm Hg   Heart Rate	 105 /min , afebrile on 3L of NC   -WBC 15 .63 , Hb 5.2, eosinophil of 1.28, NA of 153 , UA positive e yeast   -CXray showed B/L opacities and effusions   -CTAP 1.  Large bilateral pleural effusions with adjacent compressive   atelectasis resulting in near complete left and complete right lower lobe   collapse.  2.  Mild edematous thickening of the colon with mucosal hyperemia can be   seen with colitis of infectious or inflammatory etiology. No bowel   obstruction.  3.  Trace ascites. Anasarca.  -In ed rcvd cefe , flagyl and vanco, 2l of LR  and ocampo was placed in Ed (was not on it before)   PLAN :   -on IVF : LR @ 75ml/hr for 24 hours   -c/w cefepime , vanc and flagyl   -f/u infectious workup ie BCx , UCx , once the cultures are back and   are positive can consider removing line as possible site of infection   -f/u pulm crit team for pleural effusion possible tap  -chest physio   -wean oxygen as tolerated   -f/u abdominal US for ascites   -F/u burn for sacral and peg site infection   -f/u wound care consult   -f/u Duplex LE   -f/u D-dimers in am  -f/u repeat labs in am   -f/u repeat Cxray in am     #MDS with Anemia  -Hb Hb 5.2  -ordered 2 pint on pRBCs   plan ;   -f/u repeat CBC s/p transfusion  -Active TnS   - OP fu with Dr Wade     #Hypernatremia likely 2/2 to dec PO intake :   -Na 153   -on IVF   -trend BMP for sodium     #Recent admission for R hemispheric SDH w/ mass effect+midline shift s/p evacuation   MMA embolization, c/b leaking from crani site and concern for status epilepticus   -CTH : 1.  The right frontal convexity extra-axial collection has decreased in   size and density.    2.  The left frontal convexity isodense subdural collection has not   significantly changed.  -Keppra 1500 mg BID and Lacosamide 200 q12   - c/w amantadine 50mg q 12 daily       #HTN   - Echo  10/9/24: Normal global left ventricular systolic function. Left ventricular ejection fraction, by visual estimation, is 65 to 70%  - c/w lisinopril 20mg , labetalol 200mg q 8; c/w norvasc 10mg.      #CKD improving     #MISC:   holding proph Lovenox till Hb improves   resumed PEG tube feeds   on PPI   bedboud , non verbal on baseline   full code   Dispo : SDU           med rec done

## 2024-12-10 NOTE — ED ADULT NURSE NOTE - NSICDXPASTSURGICALHX_GEN_ALL_CORE_FT
PAST SURGICAL HISTORY:  H/O colonoscopy     S/P craniotomy     S/P percutaneous endoscopic gastrostomy (PEG) tube placement

## 2024-12-10 NOTE — ED ADULT NURSE REASSESSMENT NOTE - NS ED NURSE REASSESS COMMENT FT1
pt noted to be constantly bending arm despite redirection & encouragement to keep arm straight. IV abx still running at this time. MD aware.

## 2024-12-10 NOTE — ED PROVIDER NOTE - CLINICAL SUMMARY MEDICAL DECISION MAKING FREE TEXT BOX
Pt here with anemia in setting of chronic anemia 2/2 MDS and currently being tx'ed for sepsis. Here in ED, HR 100s, rectal temp negative. KAREN negative for blood. PEG site appears infected and pt tachypneic on exam. Plan for labs, ekg, imaging r/o sepsis, pneumonia, viral URI, UTI, infected PEG, anemia. Labs notable for leukocytosis and hgb 5.9. Obtained verbal consent from daughter Jose Kennedy [phone] over phone for pRBC. Sepsis suspected at 14:!6. Given 30cc/kg IVF of ideal body weight. Ordered cefepime and vanco for broad spectrum abx. Imaging on my read shows bilateral pneumonia and pleural effusions. Inpatient team will f/u official CT reads. Pt requires admission for IV abx given risk for developing worsening sepsis, bacteremia, septic shock, etc if not closely monitored and tx'ed.

## 2024-12-10 NOTE — ED ADULT TRIAGE NOTE - CHIEF COMPLAINT QUOTE
BIBA from nursing home for abnormal labs. Pt at baseline mental status. Currently being treated for sepsis at nursing home. 3LNC home O2. Tylebol being administered from nursing home.

## 2024-12-10 NOTE — ED PROVIDER NOTE - DIFFERENTIAL DIAGNOSIS
differential dx includes but is not limited to:  sepsis, pneumonia, viral URI, UTI, infected PEG, anemia Differential Diagnosis

## 2024-12-10 NOTE — H&P ADULT - ATTENDING COMMENTS
Assessment    Sepsis with wide differential, possibly from sacral ulcer / infected PEG tube site / UTI / bilateral pneumonia  Acute hypoxic and hypercapneic resp failure  Acute on chronic anemia with no sign of bleeding  Hypernatremia  Right hemispheric SDH w/ mass effect s/p evacuation and MMA embolization  Nonverbal and bedbound  Hx of status epilepticus on AEDs    Plan    - c/w vanc / cefepime / flagyl for now, daughter states her mental status has been improving here compared to at home where she was much less responsive  - f/u GI and burn  - patient received 2 units prbc and fluids, f/u repeat hgb and sodium // fluids stopped as patient has bialteral large effusions // possibly component of aspiration  - c/w keppra and lacosamide    Pending: palliative care, GI, burn, ID, critical care    # DVT PPX: SCDs    GENERAL: NAD  HEAD:  Atraumatic, normocephalic  NERVOUS SYSTEM:  A&Ox0, moving all extremities, no focal deficits   EYES: EOMI, PERRL  NECK: Supple, trachea midline, no JVD  HEART: Regular rate and rhythm  LUNGS: decreased breath sounds at bases  ABDOMEN: diffuse tenderness  EXTREMITIES: 2+ peripheral pulses bilaterally. No clubbing, cyanosis, or edema    75 minutes spent on review of labs, imaging studies, old records, obtaining history, personally examining patient, counselling and communicating with patient/ family, entering orders for medications/tests/etc, discussions with other health care providers, documentation in electronic health records, independent interpretation of labs, imaging/procedure results and care coordination.

## 2024-12-11 ENCOUNTER — RESULT REVIEW (OUTPATIENT)
Age: 79
End: 2024-12-11

## 2024-12-11 DIAGNOSIS — Z51.5 ENCOUNTER FOR PALLIATIVE CARE: ICD-10-CM

## 2024-12-11 DIAGNOSIS — Z86.79 PERSONAL HISTORY OF OTHER DISEASES OF THE CIRCULATORY SYSTEM: ICD-10-CM

## 2024-12-11 DIAGNOSIS — A41.9 SEPSIS, UNSPECIFIED ORGANISM: ICD-10-CM

## 2024-12-11 DIAGNOSIS — Z74.01 BED CONFINEMENT STATUS: ICD-10-CM

## 2024-12-11 DIAGNOSIS — Z71.89 OTHER SPECIFIED COUNSELING: ICD-10-CM

## 2024-12-11 DIAGNOSIS — D64.9 ANEMIA, UNSPECIFIED: ICD-10-CM

## 2024-12-11 DIAGNOSIS — I95.9 HYPOTENSION, UNSPECIFIED: ICD-10-CM

## 2024-12-11 LAB
ALBUMIN SERPL ELPH-MCNC: 3 G/DL — LOW (ref 3.5–5.2)
ALP SERPL-CCNC: 108 U/L — SIGNIFICANT CHANGE UP (ref 30–115)
ALT FLD-CCNC: 31 U/L — SIGNIFICANT CHANGE UP (ref 0–41)
ANION GAP SERPL CALC-SCNC: 11 MMOL/L — SIGNIFICANT CHANGE UP (ref 7–14)
APTT BLD: 30.1 SEC — SIGNIFICANT CHANGE UP (ref 27–39.2)
AST SERPL-CCNC: 35 U/L — SIGNIFICANT CHANGE UP (ref 0–41)
B PERT IGG+IGM PNL SER: CLEAR — SIGNIFICANT CHANGE UP
BASOPHILS # BLD AUTO: 0.25 K/UL — HIGH (ref 0–0.2)
BASOPHILS NFR BLD AUTO: 1.2 % — HIGH (ref 0–1)
BILIRUB SERPL-MCNC: 0.6 MG/DL — SIGNIFICANT CHANGE UP (ref 0.2–1.2)
BUN SERPL-MCNC: 43 MG/DL — HIGH (ref 10–20)
CALCIUM SERPL-MCNC: 9.3 MG/DL — SIGNIFICANT CHANGE UP (ref 8.4–10.4)
CHLORIDE SERPL-SCNC: 115 MMOL/L — HIGH (ref 98–110)
CO2 SERPL-SCNC: 25 MMOL/L — SIGNIFICANT CHANGE UP (ref 17–32)
COLOR FLD: SIGNIFICANT CHANGE UP
CREAT SERPL-MCNC: 0.8 MG/DL — SIGNIFICANT CHANGE UP (ref 0.7–1.5)
D DIMER BLD IA.RAPID-MCNC: 1822 NG/ML DDU — HIGH
EGFR: 75 ML/MIN/1.73M2 — SIGNIFICANT CHANGE UP
EOSINOPHIL # BLD AUTO: 0.48 K/UL — SIGNIFICANT CHANGE UP (ref 0–0.7)
EOSINOPHIL NFR BLD AUTO: 2.3 % — SIGNIFICANT CHANGE UP (ref 0–8)
FLUID INTAKE SUBSTANCE CLASS: SIGNIFICANT CHANGE UP
GLUCOSE SERPL-MCNC: 206 MG/DL — HIGH (ref 70–99)
GRAM STN FLD: SIGNIFICANT CHANGE UP
HCT VFR BLD CALC: 31.1 % — LOW (ref 37–47)
HGB BLD-MCNC: 10.5 G/DL — LOW (ref 12–16)
IMM GRANULOCYTES NFR BLD AUTO: 2.2 % — HIGH (ref 0.1–0.3)
INR BLD: 0.96 RATIO — SIGNIFICANT CHANGE UP (ref 0.65–1.3)
LYMPHOCYTES # BLD AUTO: 1.86 K/UL — SIGNIFICANT CHANGE UP (ref 1.2–3.4)
LYMPHOCYTES # BLD AUTO: 9 % — LOW (ref 20.5–51.1)
LYMPHOCYTES # FLD: 49 — SIGNIFICANT CHANGE UP
MAGNESIUM SERPL-MCNC: 1.9 MG/DL — SIGNIFICANT CHANGE UP (ref 1.8–2.4)
MCHC RBC-ENTMCNC: 30.4 PG — SIGNIFICANT CHANGE UP (ref 27–31)
MCHC RBC-ENTMCNC: 33.8 G/DL — SIGNIFICANT CHANGE UP (ref 32–37)
MCV RBC AUTO: 90.1 FL — SIGNIFICANT CHANGE UP (ref 81–99)
MONOCYTES # BLD AUTO: 1.51 K/UL — HIGH (ref 0.1–0.6)
MONOCYTES NFR BLD AUTO: 7.3 % — SIGNIFICANT CHANGE UP (ref 1.7–9.3)
MONOS+MACROS # FLD: 46 % — SIGNIFICANT CHANGE UP
NEUTROPHILS # BLD AUTO: 16.21 K/UL — HIGH (ref 1.4–6.5)
NEUTROPHILS NFR BLD AUTO: 78 % — HIGH (ref 42.2–75.2)
NEUTROPHILS-BODY FLUID: 5 % — SIGNIFICANT CHANGE UP
NRBC # BLD: 0 /100 WBCS — SIGNIFICANT CHANGE UP (ref 0–0)
PLATELET # BLD AUTO: 371 K/UL — SIGNIFICANT CHANGE UP (ref 130–400)
PMV BLD: 11.8 FL — HIGH (ref 7.4–10.4)
POTASSIUM SERPL-MCNC: 4.7 MMOL/L — SIGNIFICANT CHANGE UP (ref 3.5–5)
POTASSIUM SERPL-SCNC: 4.7 MMOL/L — SIGNIFICANT CHANGE UP (ref 3.5–5)
PROT SERPL-MCNC: 5.2 G/DL — LOW (ref 6–8)
PROTHROM AB SERPL-ACNC: 11.3 SEC — SIGNIFICANT CHANGE UP (ref 9.95–12.87)
RBC # BLD: 3.45 M/UL — LOW (ref 4.2–5.4)
RBC # FLD: 15.9 % — HIGH (ref 11.5–14.5)
RCV VOL RI: 0 /UL — SIGNIFICANT CHANGE UP (ref 0–0)
SODIUM SERPL-SCNC: 151 MMOL/L — HIGH (ref 135–146)
SPECIMEN SOURCE: SIGNIFICANT CHANGE UP
TOTAL NUCLEATED CELL COUNT, BODY FLUID: 35 /UL — SIGNIFICANT CHANGE UP
TUBE TYPE: SIGNIFICANT CHANGE UP
WBC # BLD: 20.77 K/UL — HIGH (ref 4.8–10.8)
WBC # FLD AUTO: 20.77 K/UL — HIGH (ref 4.8–10.8)

## 2024-12-11 PROCEDURE — 99223 1ST HOSP IP/OBS HIGH 75: CPT

## 2024-12-11 PROCEDURE — 71045 X-RAY EXAM CHEST 1 VIEW: CPT | Mod: 26

## 2024-12-11 PROCEDURE — 93970 EXTREMITY STUDY: CPT | Mod: 26

## 2024-12-11 PROCEDURE — 32554 ASPIRATE PLEURA W/O IMAGING: CPT | Mod: RT

## 2024-12-11 PROCEDURE — 99291 CRITICAL CARE FIRST HOUR: CPT | Mod: 25

## 2024-12-11 PROCEDURE — 88305 TISSUE EXAM BY PATHOLOGIST: CPT | Mod: 26

## 2024-12-11 PROCEDURE — 88112 CYTOPATH CELL ENHANCE TECH: CPT | Mod: 26

## 2024-12-11 PROCEDURE — 71045 X-RAY EXAM CHEST 1 VIEW: CPT | Mod: 26,77

## 2024-12-11 PROCEDURE — 99233 SBSQ HOSP IP/OBS HIGH 50: CPT

## 2024-12-11 RX ORDER — 0.9 % SODIUM CHLORIDE 0.9 %
1000 INTRAVENOUS SOLUTION INTRAVENOUS
Refills: 0 | Status: DISCONTINUED | OUTPATIENT
Start: 2024-12-11 | End: 2024-12-13

## 2024-12-11 RX ORDER — ACETAMINOPHEN 500MG 500 MG/1
1000 TABLET, COATED ORAL EVERY 8 HOURS
Refills: 0 | Status: DISCONTINUED | OUTPATIENT
Start: 2024-12-11 | End: 2024-12-12

## 2024-12-11 RX ORDER — PANTOPRAZOLE SODIUM 40 MG/1
40 TABLET, DELAYED RELEASE ORAL EVERY 12 HOURS
Refills: 0 | Status: DISCONTINUED | OUTPATIENT
Start: 2024-12-11 | End: 2024-12-12

## 2024-12-11 RX ADMIN — Medication 50 MILLIGRAM(S): at 16:51

## 2024-12-11 RX ADMIN — PANTOPRAZOLE SODIUM 40 MILLIGRAM(S): 40 TABLET, DELAYED RELEASE ORAL at 17:59

## 2024-12-11 RX ADMIN — METRONIDAZOLE 100 MILLIGRAM(S): 500 TABLET ORAL at 15:59

## 2024-12-11 RX ADMIN — METRONIDAZOLE 100 MILLIGRAM(S): 500 TABLET ORAL at 01:07

## 2024-12-11 RX ADMIN — LACOSAMIDE 140 MILLIGRAM(S): 10 INJECTION INTRAVENOUS at 05:33

## 2024-12-11 RX ADMIN — Medication 75 MILLILITER(S): at 01:07

## 2024-12-11 RX ADMIN — PETROLATUM 1 APPLICATION(S): 960 OINTMENT TOPICAL at 05:33

## 2024-12-11 RX ADMIN — LEVETIRACETAM 1500 MILLIGRAM(S): 1000 TABLET ORAL at 05:33

## 2024-12-11 RX ADMIN — Medication 50 MILLIGRAM(S): at 06:39

## 2024-12-11 RX ADMIN — LEVETIRACETAM 1500 MILLIGRAM(S): 1000 TABLET ORAL at 21:27

## 2024-12-11 RX ADMIN — CEFEPIME 100 MILLIGRAM(S): 2 INJECTION, POWDER, FOR SOLUTION INTRAVENOUS at 05:20

## 2024-12-11 RX ADMIN — PANTOPRAZOLE SODIUM 40 MILLIGRAM(S): 40 TABLET, DELAYED RELEASE ORAL at 05:32

## 2024-12-11 RX ADMIN — METRONIDAZOLE 100 MILLIGRAM(S): 500 TABLET ORAL at 05:32

## 2024-12-11 RX ADMIN — CEFEPIME 100 MILLIGRAM(S): 2 INJECTION, POWDER, FOR SOLUTION INTRAVENOUS at 21:27

## 2024-12-11 NOTE — CONSULT NOTE ADULT - SUBJECTIVE AND OBJECTIVE BOX
Gastroenterology Consultation:    Patient is a 79y old  Female who presents with a chief complaint of       Admitted on: 12-10-24      HPI:  A 79-year-old female with a complex medical history including myelodysplastic syndrome (MDS) followed by Dr. Wade (requiring periodic transfusions for anemia), diabetes mellitus, hypertension, chronic kidney disease, and multiple recent admissions for a right-sided subdural hematoma (SDH) with mass effect and midline shift (status-post evacuation and middle meningeal artery embolization complicated by a leaking craniotomy site and concern for status epilepticus) presented to the ED from her skilled nursing facility (SNF) due to lethargy and fever.  She is nonverbal, bedbound, and has a PEG tube. The SNF staff reported that the patient has been experiencing spiking fevers for the past 10 days.  A chest x-ray obtained at the SNF demonstrated infiltrates, prompting initiation of vancomycin for suspected pneumonia.  Her white blood cell count was elevated at 18,000/µL.  Urinalysis and urine culture were negative, as were blood cultures.  The patient has persistent anemia despite receiving weekly darbepoetin zachery (Aranesp) injections three times a week, with the last dose administered on Monday.  Her most recent hemoglobin level was 5.8 g/dL. Daughter on bedside also confirmed Diarrhea. PEG site evaluated shows surrounding cellulitis with pus. PEG flushing well. Patient was given tube feeds last night which were tolerated. CTAP shows PEG tube positioned in gastric lumen. No abscess. PEG lavage shows no blood. KAREN shows brown stool     Prior EGD:  Normal esophagus.    Normal stomach.    Normal duodenum.    Prior Colonoscopy:    none in chart    PAST MEDICAL & SURGICAL HISTORY:  DM (diabetes mellitus)      MDS (myelodysplastic syndrome)      SDH (subdural hematoma)      Seizure disorder      Chronic kidney disease (CKD)      H/O colonoscopy      S/P percutaneous endoscopic gastrostomy (PEG) tube placement      S/P craniotomy            FAMILY HISTORY:      Social History:  unable to obtain    Home Medications:  amantadine 50 mg/5 mL oral liquid: 50 milligram(s) orally 2 times a day (10 Dec 2024 19:28)  amLODIPine 10 mg oral tablet: 1 tab(s) orally once a day (10 Dec 2024 19:28)  glimepiride 2 mg oral tablet: 1 tab(s) orally 2 times a day (10 Dec 2024 19:28)  heparin: 5,000 unit(s) subcutaneous 2 times a day (10 Dec 2024 19:28)  hydrALAZINE 25 mg oral tablet: 1 tab(s) orally every 8 hours (10 Dec 2024 19:28)  labetalol 200 mg oral tablet: 1 tab(s) orally every 8 hours (10 Dec 2024 19:28)  lacosamide 200 mg/20 mL intravenous solution: 20 milliliter(s) intravenous every 12 hours (10 Dec 2024 19:28)  levETIRAcetam 100 mg/mL intravenous solution: 15 milliliter(s) intravenous every 12 hours (10 Dec 2024 19:28)  lisinopril 10 mg oral tablet: 1 tab(s) orally once a day (10 Dec 2024 19:28)  pantoprazole 40 mg oral delayed release tablet: 1 tab(s) orally once a day (before a meal) (10 Dec 2024 19:28)  polyethylene glycol 3350 oral powder for reconstitution: 17 gram(s) orally once a day (10 Dec 2024 19:28)  pyridoxine 100 mg oral tablet: 1 tab(s) orally once a day (10 Dec 2024 19:28)  senna leaf extract oral tablet: 2 tab(s) orally once a day (at bedtime) (10 Dec 2024 19:28)  SPS 15 GM/60 ML SUSPENSION: TAKE 15ML TWICE WEEKLY (10 Dec 2024 19:28)  zinc sulfate 220 mg (as elemental zinc 50 mg) oral capsule: 1 cap(s) orally once a day Continue for 3 more weeks and then stop (10 Dec 2024 19:28)        MEDICATIONS  (STANDING):  acetaminophen     Tablet .. 1000 milliGRAM(s) Oral every 8 hours  amantadine Syrup 50 milliGRAM(s) Oral two times a day  cefepime   IVPB 1000 milliGRAM(s) IV Intermittent every 12 hours  lacosamide IVPB 200 milliGRAM(s) IV Intermittent every 12 hours  levETIRAcetam   Injectable 1500 milliGRAM(s) IV Push every 12 hours  metroNIDAZOLE  IVPB 500 milliGRAM(s) IV Intermittent every 8 hours  pantoprazole  Injectable 40 milliGRAM(s) IV Push every 12 hours  petrolatum white Ointment 1 Application(s) Topical two times a day  pyridoxine 100 milliGRAM(s) Oral daily  sodium chloride 0.45%. 1000 milliLiter(s) (100 mL/Hr) IV Continuous <Continuous>  vancomycin  IVPB 1000 milliGRAM(s) IV Intermittent every 24 hours    MEDICATIONS  (PRN):      Allergies  No Known Allergies      Review of Systems:   unable to obtain        Physical Examination:  T(C): 36.5 (12-11-24 @ 16:10), Max: 36.5 (12-11-24 @ 16:10)  HR: 104 (12-11-24 @ 16:10) (93 - 104)  BP: 103/60 (12-11-24 @ 16:10) (103/60 - 157/83)  RR: 20 (12-11-24 @ 16:10) (16 - 20)  SpO2: 98% (12-11-24 @ 16:10) (98% - 100%)      12-10-24 @ 07:01  -  12-11-24 @ 07:00  --------------------------------------------------------  IN: 1116 mL / OUT: 1400 mL / NET: -284 mL          GENERAL: AAOx0, nonverbal  HEAD:  Atraumatic, Normocephalic  EYES: conjunctiva and sclera clear  NECK: Supple, no JVD or thyromegaly  CHEST/LUNG: Clear to auscultation bilaterally; No wheeze, rhonchi, or rales  HEART: Regular rate and rhythm; normal S1, S2, No murmurs.  ABDOMEN: Soft, nontender, nondistended; erythema surrounding PEG site with PUS at stoma          Data:                        10.5   20.77 )-----------( 371      ( 11 Dec 2024 14:38 )             31.1     Hgb Trend:  10.5  12-11-24 @ 14:38  5.2  12-10-24 @ 15:27      12-10-24 @ 07:01  -  12-11-24 @ 07:00  --------------------------------------------------------  IN: 356 mL      12-11    151[H]  |  115[H]  |  43[H]  ----------------------------<  206[H]  4.7   |  25  |  0.8    Ca    9.3      11 Dec 2024 14:38  Mg     1.9     12-11    TPro  5.2[L]  /  Alb  3.0[L]  /  TBili  0.6  /  DBili  x   /  AST  35  /  ALT  31  /  AlkPhos  108  12-11    Liver panel trend:  TBili 0.6   /   AST 35   /   ALT 31   /   AlkP 108   /   Tptn 5.2   /   Alb 3.0    /   DBili --      12-11  TBili 0.3   /   AST 36   /   ALT 25   /   AlkP 97   /   Tptn 5.3   /   Alb 3.3    /   DBili --      12-10      PT/INR - ( 11 Dec 2024 14:38 )   PT: 11.30 sec;   INR: 0.96 ratio         PTT - ( 11 Dec 2024 14:38 )  PTT:30.1 sec    Culture - Body Fluid with Gram Stain (collected 11 Dec 2024 10:26)  Source: Pleural Fl  Gram Stain (11 Dec 2024 20:35):    polymorphonuclear leukocytes seen    No organisms seen    by cytocentrifuge    Urinalysis with Rflx Culture (collected 10 Dec 2024 15:00)          Radiology:

## 2024-12-11 NOTE — PROGRESS NOTE ADULT - ASSESSMENT
A 79-year-old female with a complex medical history including myelodysplastic syndrome (MDS) followed by Dr. Wade (requiring periodic transfusions for anemia), diabetes mellitus, hypertension, chronic kidney disease, and multiple recent admissions for a right-sided subdural hematoma (SDH) with mass effect and midline shift (status-post evacuation and middle meningeal artery embolization complicated by a leaking craniotomy site and concern for status epilepticus) presented to the ED from her skilled nursing facility (SNF) due to lethargy and fever.  She is nonverbal, bedbound, and has a PEG tube.  She also has a recent history of *Enterococcus faecalis* urinary tract infection.    The SNF staff reported that the patient has been experiencing spiking fevers for the past 10 days.  A chest x-ray obtained at the SNF demonstrated infiltrates, prompting initiation of vancomycin for suspected pneumonia.  Her white blood cell count was elevated at 18,000/µL.  Urinalysis and urine culture were negative, as were blood cultures.  The patient has persistent anemia despite receiving weekly darbepoetin zachery (Aranesp) injections three times a week, with the last dose administered on Monday.  Her most recent hemoglobin level was 5.8 g/dL, prompting the SNF to contact the [facility] for a possible transfusion.  However, due to concerns about the patient's current infectious picture, the transfusion was deferred, and the patient was sent to the ED for further evaluation.  On presentation to the ED, the patient was lethargic.  denied nausea, vomiting, diarrhea, abdominal pain, chest pain, or sick contacts.       #Sepsis POA   -Multiple sources of infection i.e RUE midline, Right IJ mayito cath, UTI (Hx of UTI with E faecalis), Stage 3 sacral ulcer on presentation, +s/p PEG on 11/12 looks infected, b/l PNA and colitis   - BP Systolic 164/67 mm Hg, Heart Rate 105 /min , afebrile on 3L of NC   -WBC 15.63 , Hb 5.2, eosinophil of 1.28, NA of 153 , UA positive for yeast -like cells, few bacteria, moderate leuk est and WBC   -CXR showed B/L opacities and effusions   -CTAP:   1.  Large bilateral pleural effusions with adjacent compressive atelectasis resulting in near complete left and complete right lower lobe collapse   2.  Mild edematous thickening of the colon with mucosal hyperemia can be   seen with colitis of infectious or inflammatory etiology. No bowel obstruction.  3.  Trace ascites. Anasarca.  -In ed rcvd cefe , flagyl and vanco, 2L of LR and ocampo was placed in Ed (was not on it before)   PLAN :   -sp LR @ 75ml/hr   -c/w cefepime, vanc and flagyl   -f/u infectious workup ie BCx , UCx , once the cultures are back and if positive can consider removing line as possible site of infection   -sp thoracentesis with removal of 1L serous fluid   -chest physio   -wean oxygen as tolerated   -f/u abdominal US for ascites   -F/u burn for sacral and peg site infection   -f/u wound care consult   -f/u Duplex LE   -f/u D-dimers in am  -f/u repeat labs in am   -f/u repeat Cxray in am     #MDS with Anemia  -Hb Hb 5.2  -ordered 2 pint on pRBCs   plan ;   -f/u repeat CBC s/p transfusion  -Active TnS   - OP fu with Dr Wade     #Hypernatremia likely 2/2 to dec PO intake :   -Na 153   -on IVF   -trend BMP for sodium     #Recent admission for R hemispheric SDH w/ mass effect+midline shift s/p evacuation   MMA embolization, c/b leaking from crani site and concern for status epilepticus   -CTH : 1.  The right frontal convexity extra-axial collection has decreased in   size and density.    2.  The left frontal convexity isodense subdural collection has not   significantly changed.  -Keppra 1500 mg BID and Lacosamide 200 q12   - c/w amantadine 50mg q 12 daily       #HTN   - Echo  10/9/24: Normal global left ventricular systolic function. Left ventricular ejection fraction, by visual estimation, is 65 to 70%  - c/w lisinopril 20mg , labetalol 200mg q 8; c/w norvasc 10mg.      #CKD improving     #MISC:   holding proph Lovenox till Hb improves   resumed PEG tube feeds   on PPI   bedboud , non verbal on baseline   full code   Dispo : SDU         med rec completed   A 79-year-old female with a complex medical history including myelodysplastic syndrome (MDS) followed by Dr. Wade (requiring periodic transfusions for anemia), diabetes mellitus, hypertension, chronic kidney disease, and multiple recent admissions for a right-sided subdural hematoma (SDH) with mass effect and midline shift (status-post evacuation and middle meningeal artery embolization complicated by a leaking craniotomy site and concern for status epilepticus) presented to the ED from her skilled nursing facility (SNF) due to lethargy and fever.  She is nonverbal, bedbound, and has a PEG tube.  She also has a recent history of *Enterococcus faecalis* urinary tract infection.    The SNF staff reported that the patient has been experiencing spiking fevers for the past 10 days.  A chest x-ray obtained at the SNF demonstrated infiltrates, prompting initiation of vancomycin for suspected pneumonia.  Her white blood cell count was elevated at 18,000/µL.  Urinalysis and urine culture were negative, as were blood cultures.  The patient has persistent anemia despite receiving weekly darbepoetin zachery (Aranesp) injections three times a week, with the last dose administered on Monday.  Her most recent hemoglobin level was 5.8 g/dL, prompting the SNF to contact the [facility] for a possible transfusion.  However, due to concerns about the patient's current infectious picture, the transfusion was deferred, and the patient was sent to the ED for further evaluation.  On presentation to the ED, the patient was lethargic.  denied nausea, vomiting, diarrhea, abdominal pain, chest pain, or sick contacts.       #Sepsis POA   -Multiple sources of infection i.e RUE midline, Right IJ mayito cath, UTI (Hx of UTI with E faecalis), Stage 3 sacral ulcer on presentation, +s/p PEG on 11/12 looks infected, b/l PNA and colitis   - BP Systolic 164/67 mm Hg, Heart Rate 105 /min , afebrile on 3L of NC   -WBC 15.63 , Hb 5.2, eosinophil of 1.28, NA of 153 , UA positive for yeast -like cells, few bacteria, moderate leuk est and WBC   -CXR showed B/L opacities and effusions   -CTAP:   1.  Large bilateral pleural effusions with adjacent compressive atelectasis resulting in near complete left and complete right lower lobe collapse   2.  Mild edematous thickening of the colon with mucosal hyperemia can be   seen with colitis of infectious or inflammatory etiology. No bowel obstruction.  3.  Trace ascites. Anasarca.  -In ed rcvd cefe , flagyl and vanco, 2L of LR and ocampo was placed in Ed (was not on it before)   PLAN :   -sp LR @ 75ml/hr   -c/w cefepime, vanc and flagyl   -f/u infectious workup ie BCx , UCx , once the cultures are back and if positive can consider removing line as possible site of infection   -sp thoracentesis with removal of 1L serous fluid   -chest physio   -wean oxygen as tolerated   -f/u abdominal US for ascites   -F/u burn for sacral and peg site infection   -f/u wound care consult   -f/u Duplex LE   -f/u D-dimers in am  -f/u labs   -Rpt CXR: Unchanged bibasilar opacities/effusions.     #MDS with Anemia  -Hb Hb 5.2  -sp 2u pRBCs   plan ;   -f/u repeat CBC s/p transfusion  -Active TnS   - OP fu with Dr Wade     #Hypernatremia likely 2/2 to dec PO intake :   -Na 153   -sp IVR: f/u repeat Na level  -trend BMP for sodium     #Recent admission for R hemispheric SDH w/ mass effect+midline shift s/p evacuation   MMA embolization, c/b leaking from crani site and concern for status epilepticus   -CTH :   1.  The right frontal convexity extra-axial collection has decreased in   size and density.  2.  The left frontal convexity isodense subdural collection has not   significantly changed.  -c/w Keppra 1500 mg BID and Lacosamide 200 q12   - c/w amantadine 50mg q 12 daily     #Diarrhea  - held bowel regimen  - f/u GI PCR  - c-diff negative     #HTN   - Echo 10/9/24: Normal global left ventricular systolic function. Left ventricular ejection fraction, by visual estimation, is 65 to 70%  - lisinopril 20mg , labetalol 200mg q 8; norvasc 10mg held iso sepsis     #CKD (Cr 1.0 at baseline)  - Trend Cr      #MISC:   holding proph Lovenox till Hb improves   resumed PEG tube feeds   on PPI   bedboud , non verbal at baseline   full code   Dispo : SDU         med rec completed

## 2024-12-11 NOTE — CONSULT NOTE ADULT - ATTENDING COMMENTS
80yo female with gastrostomy/peristomal cellulitis. CT imaging reviewed, no evidence of collection/abscess or buried bumper syndrome. PEG appears to be functioning well without leakage or evidence of bleeding. Continue IV antibiotics, feeds as tolerated, monitor peristomal site, serial abd exams. Follow up ID recommendations.

## 2024-12-11 NOTE — CONSULT NOTE ADULT - ASSESSMENT
IMPRESSION:    UTI  HO E faecalis  UTI   Hypernatremia  Symptomatic anemia  HO MDS  Bilateral pleural effusions R>L   HO SDH SP craniotomy and MMA embolization   SP PEG   HO DM and HTN     PLAN:    CNS:  CTH noted.  EEG.  Avoid depressants     HEENT: Oral care.      PULMONARY:  HOB @ 45 degrees.  Aspiration precautions.  CT chest noted.  Possible right thoracentesis.  Wean o2 as tolerated.       CARDIOVASCULAR:  Avoid overload.  Recent ECHO noted.  Gentle LR hydration for now     GI: GI prophylaxis.  PEG Feeding.  Free h2O.  C diff.  DC bowel regimen       RENAL:  Follow up lytes.  Correct as needed.  Monitor UO.  Follow up lytes      INFECTIOUS DISEASE: Follow up cultures.  Unasyn for now. Procal.      HEMATOLOGICAL:  DVT prophylaxis.  transfuse 2 Units PRBC.  FU CBC and Coags.      ENDOCRINE:  Follow up FS.  Insulin protocol if needed.    MUSCULOSKELETAL:  Off loading.  Bed rest     DGTF     GOC     Recall if status changes.

## 2024-12-11 NOTE — PROGRESS NOTE ADULT - ATTENDING COMMENTS
***My note supersedes any discrepancies that may be above in the resident's note***    A 79-year-old female with a complex medical history including myelodysplastic syndrome (MDS) followed by Dr. Wade (requiring periodic transfusions for anemia), diabetes mellitus, hypertension, chronic kidney disease, and multiple recent admissions for a right-sided subdural hematoma (SDH) with   presented to the ED from her skilled nursing facility (SNF) due to lethargy and fever.  She is nonverbal, bedbound, and has a PEG tube.      #Sepsis POA   - Multiple sources of infection i.e RUE midline, Right IJ mayito cath, UTI (Hx of UTI with E faecalis), Stage 3 sacral ulcer on presentation, +s/p PEG on 11/12 looks infected, b/l PNA and colitis   - currently HDS, afebrile and sating well on 3L n/c  - WBC 15.63k   - UA positive for yeast -like cells, few bacteria, moderate leuk est and WBC   - CXR showed B/L opacities and effusions   - CTAP: 1.  Large bilateral pleural effusions with adjacent compressive atelectasis resulting in near complete left and complete right lower lobe collapse 2.  Mild edematous thickening of the colon with mucosal hyperemia can be seen with colitis of infectious or inflammatory etiology. No bowel obstruction. 3.  Trace ascites. Anasarca.  - s/p cefepime , flagyl and vanco, 2L of LR and ocampo was placed in Ed (was not on it before)   - sp LR @ 75ml/hr   - c/w cefepime, vanc and flagyl   - follow up with blood culture, Urine culture, and procal; if positive blood culture may need to pull midline  - F/u burn for sacral and peg site infection   - f/u wound care consult   - f/u Duplex LE   - f/u D-dimers in am     #MDS with Anemia  -Hb Hb 5.2  -sp 2u pRBCs   -f/u repeat CBC s/p transfusion  -Active TnS   - OP fu with Dr Wade     # Large pleural effusion  - s/p thoracentesis with removal of 1L serous fluid   - follow up on fluids analysis for Lights criteria    #Hypernatremia   - likely 2/2 to dec PO intake   - Na 153   - sp IVR: f/u repeat Na level  - trend BMP for sodium and avoid overcorrection    #Recent admission for R hemispheric SDH w/ mass effect+midline shift s/p evacuation   MMA embolization, c/b leaking from crani site and concern for status epilepticus   -CTH :   1.  The right frontal convexity extra-axial collection has decreased in   size and density.  2.  The left frontal convexity isodense subdural collection has not   significantly changed.  -c/w Keppra 1500 mg BID and Lacosamide 200 q12   - c/w amantadine 50mg q 12 daily     #Diarrhea  - held bowel regimen  - f/u GI PCR  - c-diff negative     #HTN   - Echo 10/9/24: Normal global left ventricular systolic function. Left ventricular ejection fraction, by visual estimation, is 65 to 70%  - lisinopril 20mg , labetalol 200mg q 8; norvasc 10mg held iso sepsis     #CKD (Cr 1.0 at baseline)  - Trend Cr     #Progress Note Handoff  Pending (specify):  infectious work up, BMP follow up  Disposition:  Unknown at this time________ ***My note supersedes any discrepancies that may be above in the resident's note***    A 79-year-old female with a complex medical history including myelodysplastic syndrome (MDS) followed by Dr. Wade (requiring periodic transfusions for anemia), diabetes mellitus, hypertension, chronic kidney disease, and multiple recent admissions for a right-sided subdural hematoma (SDH) with   presented to the ED from her skilled nursing facility (SNF) due to lethargy and fever.  She is nonverbal, bedbound, and has a PEG tube.      #Sepsis POA   # Metabolic encephalopathy  - Multiple sources of infection i.e RUE midline, Right IJ mayito cath, UTI (Hx of UTI with E faecalis), Stage 3 sacral ulcer on presentation, +s/p PEG on 11/12 looks infected, b/l PNA and colitis   - currently HDS, afebrile and sating well on 3L n/c  - WBC 15.63k -->20k  - UA positive for yeast -like cells, few bacteria, moderate leuk est and WBC   - CXR showed B/L opacities and effusions   - CTAP: 1.  Large bilateral pleural effusions with adjacent compressive atelectasis resulting in near complete left and complete right lower lobe collapse 2.  Mild edematous thickening of the colon with mucosal hyperemia can be seen with colitis of infectious or inflammatory etiology. No bowel obstruction. 3.  Trace ascites. Anasarca.  - s/p cefepime , flagyl and vanco, 2L of LR and ocampo was placed in Ed (was not on it before)   - sp LR @ 75ml/hr   - c/w cefepime, vanc and flagyl   - follow up with blood culture, Urine culture, and procal; if positive blood culture may need to pull midline and change out port  - F/u burn for sacral and peg site infection   - f/u wound care consult      #MDS with Anemia  -Hb Hb 5.2  -sp 2u pRBCs   -f/u repeat CBC s/p transfusion  -Active TnS   - OP fu with Dr Wade     # Large pleural effusion  - s/p thoracentesis with removal of 1L serous fluid   - follow up on fluids analysis for Lights criteria  - elevated d-dimer 1822  - getting CTA chest to rule out PE    #Hypernatremia   - likely 2/2 to dec PO intake   - Na 153 -->151  - sp IVF with LR in ED  - will resume with 1/2NS at 100cc/hr  - trend BMP for sodium and avoid overcorrection    #Recent admission for R hemispheric SDH w/ mass effect+midline shift s/p evacuation   MMA embolization, c/b leaking from crani site and concern for status epilepticus   -CTH :   1.  The right frontal convexity extra-axial collection has decreased in   size and density.  2.  The left frontal convexity isodense subdural collection has not   significantly changed.  -c/w Keppra 1500 mg BID and Lacosamide 200 q12   - c/w amantadine 50mg q 12 daily     #Diarrhea  - held bowel regimen  - f/u GI PCR  - c-diff negative     #HTN   - Echo 10/9/24: Normal global left ventricular systolic function. Left ventricular ejection fraction, by visual estimation, is 65 to 70%  - lisinopril 20mg , labetalol 200mg q 8; norvasc 10mg held iso sepsis     #CKD (Cr 1.0 at baseline)  - Trend Cr     #Progress Note Handoff  Pending (specify):  infectious work up, BMP follow up  Disposition:  Unknown at this time________

## 2024-12-11 NOTE — CONSULT NOTE ADULT - SUBJECTIVE AND OBJECTIVE BOX
SIMONA KUHN  79y, Female  Allergy: No Known Allergies      All historical available data reviewed.    HPI:  A 79-year-old female with a complex medical history including myelodysplastic syndrome (MDS) followed by Dr. Wade (requiring periodic transfusions for anemia), diabetes mellitus, hypertension, chronic kidney disease, and multiple recent admissions for a right-sided subdural hematoma (SDH) with mass effect and midline shift (status-post evacuation and middle meningeal artery embolization complicated by a leaking craniotomy site and concern for status epilepticus) presented to the ED from her skilled nursing facility (SNF) due to lethargy and fever.  She is nonverbal, bedbound, and has a PEG tube.  She also has a recent history of *Enterococcus faecalis* urinary tract infection.    The SNF staff reported that the patient has been experiencing spiking fevers for the past 10 days.  A chest x-ray obtained at the SNF demonstrated infiltrates, prompting initiation of vancomycin for suspected pneumonia.  Her white blood cell count was elevated at 18,000/µL.  Urinalysis and urine culture were negative, as were blood cultures.  The patient has persistent anemia despite receiving weekly darbepoetin zachery (Aranesp) injections three times a week, with the last dose administered on Monday.  Her most recent hemoglobin level was 5.8 g/dL, prompting the SNF to contact the [facility] for a possible transfusion.  However, due to concerns about the patient's current infectious picture, the transfusion was deferred, and the patient was sent to the ED for further evaluation.  On presentation to the ED, the patient was lethargic.  Daughter on bedside also confirmed Diarrhea   denied nausea, vomiting, diarrhea, abdominal pain, chest pain, or sick contacts.    Physical examination revealed multiple potential sources of infection, including a right upper extremity midline, a right internal jugular port-a-cath, and a stage 3 sacral decubitus ulcer.    In Ed on vitals :  BP Systolic 164/67 mm Hg   Heart Rate	 105 /min , afebrile on 3L of NC     On labs WBC 15 .63 , Hb 5.2, eosinophil of 1.28, NA of 153 , UA positive e yeast     CXray showed B/L opacities and effusions     CTAP 1.  Large bilateral pleural effusions with adjacent compressive   atelectasis resulting in near complete left and complete right lower lobe   collapse.  2.  Mild edematous thickening of the colon with mucosal hyperemia can be   seen with colitis of infectious or inflammatory etiology. No bowel   obstruction.  3.  Trace ascites. Anasarca.    CTH : 1.  The right frontal convexity extra-axial collection has decreased in   size and density.    2.  The left frontal convexity isodense subdural collection has not   significantly changed.    In ed rcvd cefe , flagyl and vanco, 2l of LR      Pending : 2 packs of red blood cells has been ordered     admitted to SDU for further workup .      (10 Dec 2024 18:28)    FAMILY HISTORY:    PAST MEDICAL & SURGICAL HISTORY:  DM (diabetes mellitus)      MDS (myelodysplastic syndrome)      SDH (subdural hematoma)      Seizure disorder      Chronic kidney disease (CKD)      H/O colonoscopy      S/P percutaneous endoscopic gastrostomy (PEG) tube placement      S/P craniotomy            VITALS:  T(F): 96, Max: 97.3 (12-10-24 @ 23:45)  HR: 93  BP: 144/79  RR: 18Vital Signs Last 24 Hrs  T(C): 35.6 (11 Dec 2024 08:05), Max: 36.3 (10 Dec 2024 23:45)  T(F): 96 (11 Dec 2024 08:05), Max: 97.3 (10 Dec 2024 23:45)  HR: 93 (11 Dec 2024 08:05) (91 - 96)  BP: 144/79 (11 Dec 2024 08:05) (112/66 - 157/83)  BP(mean): --  RR: 18 (11 Dec 2024 08:05) (16 - 18)  SpO2: 100% (11 Dec 2024 08:05) (96% - 100%)    Parameters below as of 11 Dec 2024 08:05  Patient On (Oxygen Delivery Method): nasal cannula  O2 Flow (L/min): 3      TESTS & MEASUREMENTS:                        10.5   20.77 )-----------( 371      ( 11 Dec 2024 14:38 )             31.1     12-11    151[H]  |  115[H]  |  43[H]  ----------------------------<  206[H]  4.7   |  25  |  0.8    Ca    9.3      11 Dec 2024 14:38  Mg     1.9     12-11    TPro  5.2[L]  /  Alb  3.0[L]  /  TBili  0.6  /  DBili  x   /  AST  35  /  ALT  31  /  AlkPhos  108  12-11    LIVER FUNCTIONS - ( 11 Dec 2024 14:38 )  Alb: 3.0 g/dL / Pro: 5.2 g/dL / ALK PHOS: 108 U/L / ALT: 31 U/L / AST: 35 U/L / GGT: x             Urinalysis with Rflx Culture (collected 12-10-24 @ 15:00)      Urinalysis Basic - ( 11 Dec 2024 14:38 )    Color: x / Appearance: x / SG: x / pH: x  Gluc: 206 mg/dL / Ketone: x  / Bili: x / Urobili: x   Blood: x / Protein: x / Nitrite: x   Leuk Esterase: x / RBC: x / WBC x   Sq Epi: x / Non Sq Epi: x / Bacteria: x          RADIOLOGY & ADDITIONAL TESTS:  Personal review of radiological diagnostics performed  Echo and EKG results noted when applicable.     MEDICATIONS:  acetaminophen     Tablet .. 1000 milliGRAM(s) Oral every 8 hours  amantadine Syrup 50 milliGRAM(s) Oral two times a day  cefepime   IVPB 1000 milliGRAM(s) IV Intermittent every 12 hours  lacosamide IVPB 200 milliGRAM(s) IV Intermittent every 12 hours  levETIRAcetam   Injectable 1500 milliGRAM(s) IV Push every 12 hours  metroNIDAZOLE  IVPB 500 milliGRAM(s) IV Intermittent every 8 hours  pantoprazole  Injectable 40 milliGRAM(s) IV Push every 12 hours  petrolatum white Ointment 1 Application(s) Topical two times a day  pyridoxine 100 milliGRAM(s) Oral daily  sodium chloride 0.45%. 1000 milliLiter(s) IV Continuous <Continuous>  vancomycin  IVPB 1000 milliGRAM(s) IV Intermittent every 24 hours      ANTIBIOTICS:  cefepime   IVPB 1000 milliGRAM(s) IV Intermittent every 12 hours  metroNIDAZOLE  IVPB 500 milliGRAM(s) IV Intermittent every 8 hours  vancomycin  IVPB 1000 milliGRAM(s) IV Intermittent every 24 hours

## 2024-12-11 NOTE — CONSULT NOTE ADULT - SUBJECTIVE AND OBJECTIVE BOX
CC: lethargy and fever    HPI:  A 79-year-old female with a complex medical history including myelodysplastic syndrome (MDS) followed by Dr. Wade (requiring periodic transfusions for anemia), diabetes mellitus, hypertension, chronic kidney disease, and multiple recent admissions for a right-sided subdural hematoma (SDH) with mass effect and midline shift (status-post evacuation and middle meningeal artery embolization complicated by a leaking craniotomy site and concern for status epilepticus) presented to the ED from her skilled nursing facility (SNF) due to lethargy and fever.  She is nonverbal, bedbound, and has a PEG tube.  She also has a recent history of *Enterococcus faecalis* urinary tract infection.    The SNF staff reported that the patient has been experiencing spiking fevers for the past 10 days.  A chest x-ray obtained at the SNF demonstrated infiltrates, prompting initiation of vancomycin for suspected pneumonia.  Her white blood cell count was elevated at 18,000/µL.  Urinalysis and urine culture were negative, as were blood cultures.  The patient has persistent anemia despite receiving weekly darbepoetin zachery (Aranesp) injections three times a week, with the last dose administered on Monday.  Her most recent hemoglobin level was 5.8 g/dL, prompting the SNF to contact the [facility] for a possible transfusion.  However, due to concerns about the patient's current infectious picture, the transfusion was deferred, and the patient was sent to the ED for further evaluation.  On presentation to the ED, the patient was lethargic.  Daughter on bedside also confirmed Diarrhea   denied nausea, vomiting, diarrhea, abdominal pain, chest pain, or sick contacts.    Physical examination revealed multiple potential sources of infection, including a right upper extremity midline, a right internal jugular port-a-cath, and a stage 3 sacral decubitus ulcer.    In Ed on vitals :  BP Systolic 164/67 mm Hg   Heart Rate	 105 /min , afebrile on 3L of NC     On labs WBC 15 .63 , Hb 5.2, eosinophil of 1.28, NA of 153 , UA positive e yeast     CXray showed B/L opacities and effusions     CTAP 1.  Large bilateral pleural effusions with adjacent compressive   atelectasis resulting in near complete left and complete right lower lobe   collapse.  2.  Mild edematous thickening of the colon with mucosal hyperemia can be   seen with colitis of infectious or inflammatory etiology. No bowel   obstruction.  3.  Trace ascites. Anasarca.    CTH : 1.  The right frontal convexity extra-axial collection has decreased in   size and density.    2.  The left frontal convexity isodense subdural collection has not   significantly changed.    In ed rcvd cefe , flagyl and vanco, 2l of LR      Pending : 2 packs of red blood cells has been ordered     admitted to SDU for further workup .      (10 Dec 2024 18:28)    PERTINENT PM/SXH:   DM (diabetes mellitus)    MDS (myelodysplastic syndrome)    SDH (subdural hematoma)    Seizure disorder    Chronic kidney disease (CKD)      No significant past surgical history    H/O colonoscopy    S/P percutaneous endoscopic gastrostomy (PEG) tube placement    S/P craniotomy      FAMILY HISTORY:    None pertinent    ITEMS NOT CHECKED ARE NOT PRESENT    SOCIAL HISTORY:   Significant other/partner[ ]  Children[ ]  Mormonism/Spirituality:  Substance hx:  [ ]   Tobacco hx:  [ ]   Alcohol hx: [ ]   Living Situation: [ ]Home  [x ]Long term care  [ ]Rehab [ ]Other  Home Services: [ ] HHA [ ] Thuy RN [ ] Hospice  Occupation:  Home Opioid hx:  [ ] Y [ ] N [x ] I-Stop Reference No:    Reference #: [id number] - no meds     ADVANCE DIRECTIVES:     [x ] Full Code [ ] DNR  MOLST  [ ]  Living Will  [ ]   DECISION MAKER(s):  [ ] Health Care Proxy(s)  [ x] Surrogate(s)  [ ] Guardian           Name(s): Phone Number(s):  Waylon Kennedy      BASELINE (I)ADL(s) (prior to admission):    [place]: [ ]Total  [ ] Moderate [ ]Dependent  Palliative Performance Status Version 2:         %    http://npcrc.org/files/news/palliative_performance_scale_ppsv2.pdf    Allergies    No Known Allergies    Intolerances    MEDICATIONS  (STANDING):  acetaminophen     Tablet .. 1000 milliGRAM(s) Oral every 8 hours  amantadine Syrup 50 milliGRAM(s) Oral two times a day  cefepime   IVPB 1000 milliGRAM(s) IV Intermittent every 12 hours  lacosamide IVPB 200 milliGRAM(s) IV Intermittent every 12 hours  levETIRAcetam   Injectable 1500 milliGRAM(s) IV Push every 12 hours  metroNIDAZOLE  IVPB 500 milliGRAM(s) IV Intermittent every 8 hours  pantoprazole  Injectable 40 milliGRAM(s) IV Push every 12 hours  petrolatum white Ointment 1 Application(s) Topical two times a day  pyridoxine 100 milliGRAM(s) Oral daily  vancomycin  IVPB 1000 milliGRAM(s) IV Intermittent every 24 hours    MEDICATIONS  (PRN):    PRESENT SYMPTOMS: [ ]Unable to obtain due to poor mentation   Source if other than patient:  [ ]Family   [ ]Team     Pain: [ ]yes [ ]no  ALL PAIN ASSESSMENT COMPONENTS WERE ASKED ABOUT UNLESS OTHERWISE NOTED  QOL impact -   Location -                    Aggravating factors -  Quality -  Radiation -  Timing-  Severity (0-10 scale):  Minimal acceptable level (0-10 scale):     CPOT:    https://www.[facility].org/getattachment/[email or web address]/Critical-Care-Pain-Observation-Tool-(CPOT)    PAIN AD Score:   http://geriatrictoolkit.[facility]/cog/painad.pdf (press ctrl +  left click to view)    Dyspnea:                           [ ]None[ ]Mild [ ]Moderate [ ]Severe     Respiratory Distress Observation Scale (RDOS):   A score of 0 to 2 signifies little or no respiratory distress, 3 signifies mild distress, scores 4 to 6 indicate moderate distress, and scores greater than 7 signify severe distress  [email or web address]/sites/default/files/[email or web address]-wwifmonfbmo-[email or web address]-zmagqfhuzen-kdlog.pdf    Anxiety:                             [ ]None[ ]Mild [ ]Moderate [ ]Severe   Fatigue:                             [ ]None[ ]Mild [ ]Moderate [ ]Severe   Nausea:                             [ ]None[ ]Mild [ ]Moderate [ ]Severe   Loss of appetite:              [ ]None[ ]Mild [ ]Moderate [ ]Severe   Constipation:                    [ ]None[ ]Mild [ ]Moderate [ ]Severe    Other Symptoms:  [ ]All other review of systems negative     Palliative Performance Status Version 2:         %    http://npcrc.org/files/news/palliative_performance_scale_ppsv2.pdf    PHYSICAL EXAM:  Vital Signs Last 24 Hrs  T(C): 35.6 (11 Dec 2024 08:05), Max: 36.8 (10 Dec 2024 13:16)  T(F): 96 (11 Dec 2024 08:05), Max: 98.2 (10 Dec 2024 13:16)  HR: 93 (11 Dec 2024 08:05) (91 - 105)  BP: 144/79 (11 Dec 2024 08:05) (112/66 - 164/67)  BP(mean): --  RR: 18 (11 Dec 2024 08:05) (16 - 18)  SpO2: 100% (11 Dec 2024 08:05) (94% - 100%)    Parameters below as of 11 Dec 2024 08:05  Patient On (Oxygen Delivery Method): nasal cannula  O2 Flow (L/min): 3   I&O's Summary    10 Dec 2024 07:01  -  11 Dec 2024 07:00  --------------------------------------------------------  IN: 1116 mL / OUT: 1400 mL / NET: -284 mL        GENERAL:  [ ] No acute distress [ ]Lethargic  [ ]Unarousable  [ ]Verbal  [ ]Non-Verbal [ ]Cachexia    BEHAVIORAL/PSYCH:  [ ]Alert and Oriented x  [ ] Anxiety [ ] Delirium [ ] Agitation [ ] Calm   EYES: [ ] No scleral icterus [ ] Scleral icterus [ ] Closed  ENMT:  [ ]Dry mouth  [ ]No external oral lesions [ ] No external ear or nose lesions  CARDIOVASCULAR:  [ ]Regular [ ]Irregular [ ]Tachy [ ]Not Tachy  [ ][place] [ ] Edema [ ] No edema  PULMONARY:  [ ]Tachypnea  [ ]Audible excessive secretions [ ] No labored breathing [ ] labored breathing  GASTROINTESTINAL: [ ]Soft  [ ]Distended  [ ]Not distended [ ]Non tender [ ]Tender  MUSCULOSKELETAL: [ ]No clubbing [ ] clubbing  [ ] No cyanosis [ ] cyanosis  NEUROLOGIC: [ ]No focal deficits  [ ]Follows commands  [ ]Does not follow commands  [ ]Cognitive impairment  [ ]Dysphagia  [ ]Dysarthria  [ ]Paresis   SKIN: [ ] Jaundiced [ ] Non-jaundiced [ ]Rash [ ]No Rash [ ] Warm [ ] Dry  MISC/LINES: [ ] ET tube [ ] Trach [ ]NGT/OGT [ ]PEG [ ]Small    LABS: reviewed by me                        5.2    15.63 )-----------( 341      ( 10 Dec 2024 15:27 )             17.1   12-10    153[H]  |  115[H]  |  55[H]  ----------------------------<  93  4.7   |  26  |  1.0    Ca    9.6      10 Dec 2024 14:01  Mg     2.0     12-10    TPro  5.3[L]  /  Alb  3.3[L]  /  TBili  0.3  /  DBili  x   /  AST  36  /  ALT  25  /  AlkPhos  97  12-10  PT/INR - ( 10 Dec 2024 14:01 )   PT: 12.50 sec;   INR: 1.06 ratio             Urinalysis Basic - ( 10 Dec 2024 15:00 )    Color: Yellow / Appearance: Clear / S.020 / pH: x  Gluc: x / Ketone: Negative mg/dL  / Bili: Negative / Urobili: 0.2 mg/dL   Blood: x / Protein: 30 mg/dL / Nitrite: Negative   Leuk Esterase: Moderate / RBC: 6 /HPF / WBC 59 /HPF   Sq Epi: x / Non Sq Epi: 2 /HPF / Bacteria: Few /HPF      RADIOLOGY & ADDITIONAL STUDIES: reviewed by me    EKG: reviewed by me      PROTEIN CALORIE MALNUTRITION PRESENT: [ ]mild [ ]moderate [ ]severe [ ]underweight [ ]morbid obesity  https://www.andeal.org/vault/2440/web/files/ONC/Table_Clinical%20Characteristics%20to%20Document%20Malnutrition-White%20JV%20et%20al%412497.pdf    Height (cm): 162.6 (12-10-24 @ 11:19), 162.6 (24 @ 08:42), 162.6 (10-09-24 @ 17:30)  Weight (kg): 62.1 (12-10-24 @ :19), 66.4 (24 @ 08:42), 64.3 (10-09-24 @ 17:30)  BMI (kg/m2): 23.5 (12-10-24 @ 11:19), 25.1 (24 @ 08:42), 24.3 (10-09-24 @ 17:30)  [ ]PPSV2 < or = to 30% [ ]significant weight loss  [ ]poor nutritional intake  [ ]anasarca      [ ]Artificial Nutrition      Palliative Care Spiritual/Emotional Screening Tool Question  Severity (0-4):                    OR                    [ x] Unable to determine. Will assess at later time if appropriate.  Score of 2 or greater indicates recommendation of Chaplaincy and/or SW referral  Chaplaincy Referral: [ ] Yes [ ] Refused [ ] Following     Caregiver [place]:  [ ] Yes [ ] No    OR    [x ] Unable to determine. Will assess at later time if appropriate.  Social Work Referral [ ]  Patient and Family Centered Care Referral [ ]    Anticipatory Grief Present: [ ] Yes [ ] No    OR     [ x] Unable to determine. Will assess at later time if appropriate.  Social Work Referral [ ]  Patient and Family Centered Care Referral [ ]    Patient discussed with primary medical team MD  Palliative care education provided to patient and/or family   CC: lethargy and fever    HPI:  A 79-year-old female with a complex medical history including myelodysplastic syndrome (MDS) followed by Dr. Wade (requiring periodic transfusions for anemia), diabetes mellitus, hypertension, chronic kidney disease, and multiple recent admissions for a right-sided subdural hematoma (SDH) with mass effect and midline shift (status-post evacuation and middle meningeal artery embolization complicated by a leaking craniotomy site and concern for status epilepticus) presented to the ED from her skilled nursing facility (SNF) due to lethargy and fever.  She is nonverbal, bedbound, and has a PEG tube.  She also has a recent history of *Enterococcus faecalis* urinary tract infection.    The SNF staff reported that the patient has been experiencing spiking fevers for the past 10 days.  A chest x-ray obtained at the SNF demonstrated infiltrates, prompting initiation of vancomycin for suspected pneumonia.  Her white blood cell count was elevated at 18,000/µL.  Urinalysis and urine culture were negative, as were blood cultures.  The patient has persistent anemia despite receiving weekly darbepoetin zachery (Aranesp) injections three times a week, with the last dose administered on Monday.  Her most recent hemoglobin level was 5.8 g/dL, prompting the SNF to contact the [facility] for a possible transfusion.  However, due to concerns about the patient's current infectious picture, the transfusion was deferred, and the patient was sent to the ED for further evaluation.  On presentation to the ED, the patient was lethargic.  Daughter on bedside also confirmed Diarrhea   denied nausea, vomiting, diarrhea, abdominal pain, chest pain, or sick contacts.    Physical examination revealed multiple potential sources of infection, including a right upper extremity midline, a right internal jugular port-a-cath, and a stage 3 sacral decubitus ulcer.    In Ed on vitals :  BP Systolic 164/67 mm Hg   Heart Rate	 105 /min , afebrile on 3L of NC     On labs WBC 15 .63 , Hb 5.2, eosinophil of 1.28, NA of 153 , UA positive e yeast     CXray showed B/L opacities and effusions     CTAP 1.  Large bilateral pleural effusions with adjacent compressive   atelectasis resulting in near complete left and complete right lower lobe   collapse.  2.  Mild edematous thickening of the colon with mucosal hyperemia can be   seen with colitis of infectious or inflammatory etiology. No bowel   obstruction.  3.  Trace ascites. Anasarca.    CTH : 1.  The right frontal convexity extra-axial collection has decreased in   size and density.    2.  The left frontal convexity isodense subdural collection has not   significantly changed.    In ed rcvd cefe , flagyl and vanco, 2l of LR      Pending : 2 packs of red blood cells has been ordered     admitted to SDU for further workup .      (10 Dec 2024 18:28)    PERTINENT PM/SXH:   DM (diabetes mellitus)    MDS (myelodysplastic syndrome)    SDH (subdural hematoma)    Seizure disorder    Chronic kidney disease (CKD)      No significant past surgical history    H/O colonoscopy    S/P percutaneous endoscopic gastrostomy (PEG) tube placement    S/P craniotomy      FAMILY HISTORY:    None pertinent    ITEMS NOT CHECKED ARE NOT PRESENT    SOCIAL HISTORY:   Significant other/partner[ ]  Children[ ]  Episcopalian/Spirituality:  Substance hx:  [ ]   Tobacco hx:  [ ]   Alcohol hx: [ ]   Living Situation: [ ]Home  [x ]Long term care  [ ]Rehab [ ]Other  Home Services: [ ] HHA [ ] Thuy RN [ ] Hospice  Occupation:  Home Opioid hx:  [ ] Y [ ] N [x ] I-Stop Reference No:    Reference #: [id number] - no meds     ADVANCE DIRECTIVES:     [x ] Full Code [ ] DNR  MOLST  [ ]  Living Will  [ ]   DECISION MAKER(s):  [ ] Health Care Proxy(s)  [ x] Surrogate(s)  [ ] Guardian           Name(s): Phone Number(s):  Waylon Kennedy      BASELINE (I)ADL(s) (prior to admission):    [place]: [ ]Total  [ ] Moderate [ ]Dependent  Palliative Performance Status Version 2:         %    http://npcrc.org/files/news/palliative_performance_scale_ppsv2.pdf    Allergies    No Known Allergies    Intolerances    MEDICATIONS  (STANDING):  acetaminophen     Tablet .. 1000 milliGRAM(s) Oral every 8 hours  amantadine Syrup 50 milliGRAM(s) Oral two times a day  cefepime   IVPB 1000 milliGRAM(s) IV Intermittent every 12 hours  lacosamide IVPB 200 milliGRAM(s) IV Intermittent every 12 hours  levETIRAcetam   Injectable 1500 milliGRAM(s) IV Push every 12 hours  metroNIDAZOLE  IVPB 500 milliGRAM(s) IV Intermittent every 8 hours  pantoprazole  Injectable 40 milliGRAM(s) IV Push every 12 hours  petrolatum white Ointment 1 Application(s) Topical two times a day  pyridoxine 100 milliGRAM(s) Oral daily  vancomycin  IVPB 1000 milliGRAM(s) IV Intermittent every 24 hours    MEDICATIONS  (PRN):    PRESENT SYMPTOMS: [x ]Unable to obtain due to poor mentation   Source if other than patient:  [ ]Family   [ ]Team     Pain: [ ]yes [ ]no  ALL PAIN ASSESSMENT COMPONENTS WERE ASKED ABOUT UNLESS OTHERWISE NOTED  QOL impact -   Location -                    Aggravating factors -  Quality -  Radiation -  Timing-  Severity (0-10 scale):  Minimal acceptable level (0-10 scale):     CPOT:    https://www.[facility].org/getattachment/[email or web address]/Critical-Care-Pain-Observation-Tool-(CPOT)    PAIN AD Score: 0  http://geriatrictoolkit.[facility]/cog/painad.pdf (press ctrl +  left click to view)    Dyspnea:                           [ ]None[ ]Mild [ ]Moderate [ ]Severe     Respiratory Distress Observation Scale (RDOS): 2  A score of 0 to 2 signifies little or no respiratory distress, 3 signifies mild distress, scores 4 to 6 indicate moderate distress, and scores greater than 7 signify severe distress  https://[email or web address].ca/sites/default/files/PDFS/233005-dzczokfbycm-unjajvva-hspwiiadsom-ydxxi.pdf    Anxiety:                             [ ]None[ ]Mild [ ]Moderate [ ]Severe   Fatigue:                             [ ]None[ ]Mild [ ]Moderate [ ]Severe   Nausea:                             [ ]None[ ]Mild [ ]Moderate [ ]Severe   Loss of appetite:              [ ]None[ ]Mild [ ]Moderate [ ]Severe   Constipation:                    [ ]None[ ]Mild [ ]Moderate [ ]Severe    Other Symptoms:  [ ]All other review of systems negative     Palliative Performance Status Version 2:        20 %    http://npcrc.org/files/news/palliative_performance_scale_ppsv2.pdf    PHYSICAL EXAM:  Vital Signs Last 24 Hrs  T(C): 35.6 (11 Dec 2024 08:05), Max: 36.8 (10 Dec 2024 13:16)  T(F): 96 (11 Dec 2024 08:05), Max: 98.2 (10 Dec 2024 13:16)  HR: 93 (11 Dec 2024 08:05) (91 - 105)  BP: 144/79 (11 Dec 2024 08:05) (112/66 - 164/67)  BP(mean): --  RR: 18 (11 Dec 2024 08:05) (16 - 18)  SpO2: 100% (11 Dec 2024 08:05) (94% - 100%)    Parameters below as of 11 Dec 2024 08:05  Patient On (Oxygen Delivery Method): nasal cannula  O2 Flow (L/min): 3   I&O's Summary    10 Dec 2024 07:01  -  11 Dec 2024 07:00  --------------------------------------------------------  IN: 1116 mL / OUT: 1400 mL / NET: -284 mL        GENERAL:  [ ] No acute distress [ ]Lethargic  [x ]Unarousable  [ ]Verbal  [ ]Non-Verbal [ ]Cachexia    BEHAVIORAL/PSYCH:  [ ]Alert and Oriented x  [ ] Anxiety [ ] Delirium [ ] Agitation [ ] Calm   EYES: [ ] No scleral icterus [ ] Scleral icterus [ ] Closed  ENMT:  [ ]Dry mouth  [x ]No external oral lesions [ ] No external ear or nose lesions  CARDIOVASCULAR:  [ ]Regular [ ]Irregular [ ]Tachy [ ]Not Tachy  [ ][place] [ ] Edema [ ] No edema  PULMONARY:  [ ]Tachypnea  [ ]Audible excessive secretions [x ] No labored breathing [ ] labored breathing  GASTROINTESTINAL: [ ]Soft  [ ]Distended  [ ]Not distended [ ]Non tender [ ]Tender  MUSCULOSKELETAL: [ ]No clubbing [ ] clubbing  [ ] No cyanosis [ ] cyanosis  NEUROLOGIC: [ ]No focal deficits  [ ]Follows commands  [ ]Does not follow commands  [x ]Cognitive impairment  [ ]Dysphagia  [ ]Dysarthria  [ ]Paresis   SKIN: [ ] Jaundiced [ ] Non-jaundiced [ ]Rash [ ]No Rash [ ] Warm [ ] Dry  MISC/LINES: [ ] ET tube [ ] Trach [ ]NGT/OGT [ ]PEG [ ]Small    LABS: reviewed by me                        5.2    15.63 )-----------( 341      ( 10 Dec 2024 15:27 )             17.1   12-10    153[H]  |  115[H]  |  55[H]  ----------------------------<  93  4.7   |  26  |  1.0    Ca    9.6      10 Dec 2024 14:01  Mg     2.0     12-10    TPro  5.3[L]  /  Alb  3.3[L]  /  TBili  0.3  /  DBili  x   /  AST  36  /  ALT  25  /  AlkPhos  97  12-10  PT/INR - ( 10 Dec 2024 14:01 )   PT: 12.50 sec;   INR: 1.06 ratio             Urinalysis Basic - ( 10 Dec 2024 15:00 )    Color: Yellow / Appearance: Clear / S.020 / pH: x  Gluc: x / Ketone: Negative mg/dL  / Bili: Negative / Urobili: 0.2 mg/dL   Blood: x / Protein: 30 mg/dL / Nitrite: Negative   Leuk Esterase: Moderate / RBC: 6 /HPF / WBC 59 /HPF   Sq Epi: x / Non Sq Epi: 2 /HPF / Bacteria: Few /HPF      RADIOLOGY & ADDITIONAL STUDIES: reviewed by me    < from: Xray Chest 1 View- PORTABLE-Routine (24 @ 04:40) >  IMPRESSION:    Unchanged bibasilar opacities/effusions.    < end of copied text >      EKG: reviewed by me    < from: 12 Lead ECG (12.10.24 @ 16:01) >  Ventricular Rate 95 BPM    Atrial Rate 95 BPM    P-R Interval 150 ms    QRS Duration 68 ms    Q-T Interval 334 ms    QTC Calculation(Bazett) 419 ms    P Axis 40 degrees    R Axis 64 degrees    T Axis 28 degrees    Diagnosis Line Normal sinus rhythm  Low voltage QRS  Poor R wave progression    < end of copied text >      PROTEIN CALORIE MALNUTRITION PRESENT: [ ]mild [ ]moderate [ ]severe [ ]underweight [ ]morbid obesity  https://www.andeal.org/vault/5980/web/files/ONC/Table_Clinical%20Characteristics%20to%20Document%20Malnutrition-White%20JV%20et%20al%2020.pdf    Height (cm): 162.6 (12-10-24 @ 11:19), 162.6 (24 @ 08:42), 162.6 (10-09-24 @ 17:30)  Weight (kg): 62.1 (12-10-24 @ 11:19), 66.4 (24 @ 08:42), 64.3 (10-09-24 @ 17:30)  BMI (kg/m2): 23.5 (12-10-24 @ :19), 25.1 (24 @ 08:42), 24.3 (10-09-24 @ 17:30)  [ ]PPSV2 < or = to 30% [ ]significant weight loss  [ ]poor nutritional intake  [ ]anasarca      [ ]Artificial Nutrition      Palliative Care Spiritual/Emotional Screening Tool Question  Severity (0-4):                    OR                    [ x] Unable to determine. Will assess at later time if appropriate.  Score of 2 or greater indicates recommendation of Chaplaincy and/or SW referral  Chaplaincy Referral: [ ] Yes [ ] Refused [ ] Following     Caregiver [place]:  [ ] Yes [ ] No    OR    [x ] Unable to determine. Will assess at later time if appropriate.  Social Work Referral [ ]  Patient and Family Centered Care Referral [ ]    Anticipatory Grief Present: [ ] Yes [ ] No    OR     [ x] Unable to determine. Will assess at later time if appropriate.  Social Work Referral [ ]  Patient and Family Centered Care Referral [ ]    Patient discussed with primary medical team MD  Palliative care education provided to patient and/or family

## 2024-12-11 NOTE — CONSULT NOTE ADULT - CONVERSATION DETAILS
Spoke with daughter over the phone. Palliative care reintroduced. She noted that she spoke with the primary team recently and they are trying to determine the source of the infection. We discussed overall GOC and code status. She noted that she wants everything done for the patient to make her better and keep her alive. She wants CPR and intubation even if the patient would likely not be medically extubatable. All questions answered.

## 2024-12-11 NOTE — CONSULT NOTE ADULT - NS ATTEST RISK PROBLEM GEN_ALL_CORE FT
-My assessment required an independent historian and is independent of the teaching service  -I independently interpreted the most recent imaging ( CXR ) and labs ( CBC, CMP) and cultures ( along with the sensitivities / MILLIE )  -I discussed my recommendations with the primary team housestaff/Attending  -I assisted with initiation of antibiotics  -Pt is on ABx therapy requiring intensive monitoring for toxicity

## 2024-12-11 NOTE — CONSULT NOTE ADULT - ASSESSMENT
79yFemale with history of MDS, DM, HTN, CKD, recent SDH with mass effect and midline shift presents with lethargy and fever.  Patient bedbound and nonverbal at baseline. Patient with sepsis POA in setting likely pyelonephritis, anemia requiring transfusion of PRBC, and a large pleural effusion s/p thoracentesis.  Palliative care consulted for GOC.    Spoke with daughter over the phone. Palliative care reintroduced. She noted that she spoke with the primary team recently and they are trying to determine the source of the infection. We discussed overall GOC and code status. She noted that she wants everything done for the patient to make her better and keep her alive. She wants CPR and intubation even if the patient would likely not be medically extubatable. All questions answered.    Recommendations  -Full code  -ongoing medical management  -antibiotics, O2 management, workup and pressors per primary team  -trend Hgb and transfuse PRN    Will sign off for now as family's goals are clear. If additional palliative care involvement is needed, please call family and make sure they are open to palliative care involvement prior to placing another consult. Thanks for the consult.      Education about palliative care provided to patient/family.  See Recs below.    Please call [phone] with questions or concerns 24/7.   We will continue to follow.

## 2024-12-11 NOTE — PROGRESS NOTE ADULT - SUBJECTIVE AND OBJECTIVE BOX
SUBJECTIVE/OVERNIGHT EVENTS  Today is hospital day 1d. This morning patient was seen and examined at bedside.   Pt is nonverbal but awake.       MEDICATIONS  STANDING MEDICATIONS  acetaminophen     Tablet .. 1000 milliGRAM(s) Oral every 8 hours  amantadine Syrup 50 milliGRAM(s) Oral two times a day  cefepime   IVPB 1000 milliGRAM(s) IV Intermittent every 12 hours  lacosamide IVPB 200 milliGRAM(s) IV Intermittent every 12 hours  levETIRAcetam   Injectable 1500 milliGRAM(s) IV Push every 12 hours  metroNIDAZOLE  IVPB 500 milliGRAM(s) IV Intermittent every 8 hours  pantoprazole  Injectable 40 milliGRAM(s) IV Push every 12 hours  petrolatum white Ointment 1 Application(s) Topical two times a day  polyethylene glycol 3350 17 Gram(s) Oral daily  pyridoxine 100 milliGRAM(s) Oral daily  senna 2 Tablet(s) Oral at bedtime  vancomycin  IVPB 1000 milliGRAM(s) IV Intermittent every 24 hours    PRN MEDICATIONS    VITALS  T(F): 96 (24 @ 08:05), Max: 98.2 (12-10-24 @ 13:16)  HR: 93 (24 @ 08:05) (91 - 105)  BP: 144/79 (24 @ 08:05) (112/66 - 164/67)  RR: 18 (24 @ 08:05) (16 - 18)  SpO2: 100% (24 @ 08:05) (94% - 100%)  POCT Blood Glucose.: 97 mg/dL (12-10-24 @ 13:54)    PHYSICAL EXAM  General: patient in moderate distress  HEENT: EOMi, no scleral icterus, crusting present around pt's mouth   CV: RRR, normal s1 and s2  Lungs: diffuse rhonchi, on NC  Abd: Soft, nontender, BS+  Ext: b/l mild LE edema   Psych: nonverbal  Skin: PEG site with surrounding erythema and tenderness, port site on right side with no surrounding erythema or tenderness    LABS             5.2    15.63 )-----------( 341      ( 12-10-24 @ 15:27 )             17.1     153  |  115  |  55  -------------------------<  93   12-10-24 @ 14:01  4.7  |  26  |  1.0    Ca      9.6     12-10-24 @ 14:01  Mg     2.0     12-10-24 @ 14:01    TPro  5.3  /  Alb  3.3  /  TBili  0.3  /  DBili  x   /  AST  36  /  ALT  25  /  AlkPhos  97  /  GGT  x     12-10-24 @ 14:01    PT/INR - ( 12-10-24 @ 14:01 )   PT: 12.50 sec;   INR: 1.06 ratio      Urinalysis Basic - ( 10 Dec 2024 15:00 )    Color: Yellow / Appearance: Clear / S.020 / pH: x  Gluc: x / Ketone: Negative mg/dL  / Bili: Negative / Urobili: 0.2 mg/dL   Blood: x / Protein: 30 mg/dL / Nitrite: Negative   Leuk Esterase: Moderate / RBC: 6 /HPF / WBC 59 /HPF   Sq Epi: x / Non Sq Epi: 2 /HPF / Bacteria: Few /HPF          Urinalysis with Rflx Culture (collected 10 Dec 2024 15:00)

## 2024-12-11 NOTE — CONSULT NOTE ADULT - SUBJECTIVE AND OBJECTIVE BOX
Patient is a 79y old  Female who presents with a chief complaint of lethargy     HPI:  A 79-year-old female with a complex medical history including myelodysplastic syndrome (MDS) followed by Dr. Wade (requiring periodic transfusions for anemia), diabetes mellitus, hypertension, chronic kidney disease, and multiple recent admissions for a right-sided subdural hematoma (SDH) with mass effect and midline shift (status-post evacuation and middle meningeal artery embolization complicated by a leaking craniotomy site and concern for status epilepticus) presented to the ED from her skilled nursing facility (SNF) due to lethargy and fever.  She is nonverbal, bedbound, and has a PEG tube.  She also has a recent history of *Enterococcus faecalis* urinary tract infection.    The SNF staff reported that the patient has been experiencing spiking fevers for the past 10 days.  A chest x-ray obtained at the SNF demonstrated infiltrates, prompting initiation of vancomycin for suspected pneumonia.  Her white blood cell count was elevated at 18,000/µL.  Urinalysis and urine culture were negative, as were blood cultures.  The patient has persistent anemia despite receiving weekly darbepoetin zachery (Aranesp) injections three times a week, with the last dose administered on Monday.  Her most recent hemoglobin level was 5.8 g/dL, prompting the SNF to contact the [facility] for a possible transfusion.  However, due to concerns about the patient's current infectious picture, the transfusion was deferred, and the patient was sent to the ED for further evaluation.  On presentation to the ED, the patient was lethargic.  Daughter on bedside also confirmed Diarrhea   denied nausea, vomiting, diarrhea, abdominal pain, chest pain, or sick contacts.    Physical examination revealed multiple potential sources of infection, including a right upper extremity midline, a right internal jugular port-a-cath, and a stage 3 sacral decubitus ulcer.    In Ed on vitals :  BP Systolic 164/67 mm Hg   Heart Rate	 105 /min , afebrile on 3L of NC     On labs WBC 15 .63 , Hb 5.2, eosinophil of 1.28, NA of 153 , UA positive e yeast     CXray showed B/L opacities and effusions     CTAP 1.  Large bilateral pleural effusions with adjacent compressive   atelectasis resulting in near complete left and complete right lower lobe   collapse.  2.  Mild edematous thickening of the colon with mucosal hyperemia can be   seen with colitis of infectious or inflammatory etiology. No bowel   obstruction.  3.  Trace ascites. Anasarca.    CTH : 1.  The right frontal convexity extra-axial collection has decreased in   size and density.    2.  The left frontal convexity isodense subdural collection has not   significantly changed.    In ed rcvd cefe , flagyl and vanco, 2l of LR      Pending : 2 packs of red blood cells has been ordered     admitted to SDU for further workup .      (10 Dec 2024 18:28)      PAST MEDICAL & SURGICAL HISTORY:  DM (diabetes mellitus)      MDS (myelodysplastic syndrome)      SDH (subdural hematoma)      Seizure disorder      Chronic kidney disease (CKD)      H/O colonoscopy      S/P percutaneous endoscopic gastrostomy (PEG) tube placement      S/P craniotomy          SOCIAL HX:   Smoking   UTO                       ETOH                            Other    FAMILY HISTORY:  :  No known cardiovacular family hisotry     Review Of Systems:     All ROS are negative except per HPI       Allergies    No Known Allergies    Intolerances          PHYSICAL EXAM    ICU Vital Signs Last 24 Hrs  T(C): 36.3 (10 Dec 2024 23:45), Max: 36.8 (10 Dec 2024 13:16)  T(F): 97.3 (10 Dec 2024 23:45), Max: 98.2 (10 Dec 2024 13:16)  HR: 93 (11 Dec 2024 06:22) (91 - 105)  BP: 157/83 (11 Dec 2024 06:22) (112/66 - 164/67)  BP(mean): --  ABP: --  ABP(mean): --  RR: 18 (11 Dec 2024 06:22) (16 - 18)  SpO2: 99% (11 Dec 2024 06:22) (94% - 100%)    O2 Parameters below as of 11 Dec 2024 06:22  Patient On (Oxygen Delivery Method): nasal cannula  O2 Flow (L/min): 3          CONSTITUTIONAL:  Ill appearing     ENT:   Airway patent,   Mouth with normal mucosa.     CARDIAC:   Normal rate,   Regular rhythm.      RESPIRATORY:   No wheezing  Dec BS both bases   Not tachypneic,  No use of accessory muscles    GASTROINTESTINAL:  Abdomen soft,   Non-tender,   No guarding,   + BS      NEUROLOGICAL:   Alert   Confused   No motor deficits.    SKIN:   Skin normal color for race,   No evidence of rash.      12-10-24 @ 07:01  -  24 @ 07:00  --------------------------------------------------------  IN:    Enteral Tube Flush: 150 mL    Glucerna: 360 mL    PRBCs (Packed Red Blood Cells): 356 mL    Sodium Chloride 0.9% Bolus: 250 mL  Total IN: 1116 mL    OUT:    Indwelling Catheter - Urethral (mL): 1400 mL  Total OUT: 1400 mL    Total NET: -284 mL          LABS:                          5.2    15.63 )-----------( 341      ( 10 Dec 2024 15:27 )             17.1                                               12-10    153[H]  |  115[H]  |  55[H]  ----------------------------<  93  4.7   |  26  |  1.0    Ca    9.6      10 Dec 2024 14:01  Mg     2.0     12-10    TPro  5.3[L]  /  Alb  3.3[L]  /  TBili  0.3  /  DBili  x   /  AST  36  /  ALT  25  /  AlkPhos  97  12-10      PT/INR - ( 10 Dec 2024 14:01 )   PT: 12.50 sec;   INR: 1.06 ratio                                                Urinalysis Basic - ( 10 Dec 2024 15:00 )    Color: Yellow / Appearance: Clear / S.020 / pH: x  Gluc: x / Ketone: Negative mg/dL  / Bili: Negative / Urobili: 0.2 mg/dL   Blood: x / Protein: 30 mg/dL / Nitrite: Negative   Leuk Esterase: Moderate / RBC: 6 /HPF / WBC 59 /HPF   Sq Epi: x / Non Sq Epi: 2 /HPF / Bacteria: Few /HPF                                                  LIVER FUNCTIONS - ( 10 Dec 2024 14:01 )  Alb: 3.3 g/dL / Pro: 5.3 g/dL / ALK PHOS: 97 U/L / ALT: 25 U/L / AST: 36 U/L / GGT: x                                                  Urinalysis with Rflx Culture (collected 10 Dec 2024 15:00)                                                                                           X-Rays reviewed                                                                                     ECHO        MEDICATIONS  (STANDING):  acetaminophen     Tablet .. 1000 milliGRAM(s) Oral every 8 hours  amantadine Syrup 50 milliGRAM(s) Oral two times a day  cefepime   IVPB 1000 milliGRAM(s) IV Intermittent every 12 hours  lacosamide IVPB 200 milliGRAM(s) IV Intermittent every 12 hours  levETIRAcetam   Injectable 1500 milliGRAM(s) IV Push every 12 hours  metroNIDAZOLE  IVPB 500 milliGRAM(s) IV Intermittent every 8 hours  pantoprazole  Injectable 40 milliGRAM(s) IV Push every 12 hours  petrolatum white Ointment 1 Application(s) Topical two times a day  polyethylene glycol 3350 17 Gram(s) Oral daily  pyridoxine 100 milliGRAM(s) Oral daily  senna 2 Tablet(s) Oral at bedtime  vancomycin  IVPB 1000 milliGRAM(s) IV Intermittent every 24 hours    MEDICATIONS  (PRN):

## 2024-12-11 NOTE — CONSULT NOTE ADULT - ASSESSMENT
79-year-old female with a complex medical history including myelodysplastic syndrome (MDS) followed by Dr. Wade (requiring periodic transfusions for anemia), diabetes mellitus, hypertension, chronic kidney disease, and multiple recent admissions for a right-sided subdural hematoma (SDH) with mass effect and midline shift (status-post evacuation and middle meningeal artery embolization complicated by a leaking craniotomy site and concern for status epilepticus) presented to the ED from her skilled nursing facility (SNF) due to lethargy and fever.     IMPRESSION/RECOMMENDATIONS  Immunosuppression/Immunosenescence ( above age 60 yrs there is a exponential decline in immunity which could result in poor clinical outcomes.  Acute illness ( sepsis/UTI/PNA )  which poses a threat to life or bodily function without treatment   Sepsis ( /m, WBC 15.6 )  Hypoxic respiratory failure   PNA : bibasilar  < from: CT Chest w/ IV Cont (12.10.24 @ 14:31) > ( Independent interpretation of test : PNA  1.  Large bilateral pleural effusions with adjacent compressive   atelectasis resulting in near complete left and complete rightlower lobe   collapse.  2.  Mild edematous thickening of the colon with mucosal hyperemia can be   seen with colitis of infectious or inflammatory etiology. No bowel   obstruction.    12/10 CXR opacites b/l. ( Independent interpretation of test : PNA  12/11 S/p R thoracocentesis : 1000 ccs of fluid    Acute pyelonephritis with no obstructive uropathy   Pyuria     Colitis on CT with diarrhea. R/o CD colitis    Pressure related sacral ulcer : non infected. Off loading    G tube site : has cellulitis. R/o associated abscess        -Urine for strep pneumonia/legionella antigen  -Nares ORSA  -Sputum gm stain, cultures  -BCx   -UCX  -Off loading to prevent pressure sores and preventive measures to avoid aspiration  -FU pleural fluid cultures  -GI evaluation of G tube site  -Stools for CD today  -Vancomycin 1 gm iv q12h. Vancomycin will be monitored daily for nephrotoxicity by checking the GFR  -Cefepime 2 gm iv q8h  -Flagyl 500 mg via G tube q8h

## 2024-12-11 NOTE — CONSULT NOTE ADULT - ASSESSMENT
A 79-year-old female with a complex medical history including myelodysplastic syndrome (MDS) followed by Dr. Wade (requiring periodic transfusions for anemia), diabetes mellitus, hypertension, chronic kidney disease, and multiple recent admissions for a right-sided subdural hematoma (SDH) with mass effect and midline shift (status-post evacuation and middle meningeal artery embolization complicated by a leaking craniotomy site and concern for status epilepticus) presented to the ED from her skilled nursing facility (SNF) due to lethargy and fever.  She is nonverbal, bedbound, and has a PEG tube. The SNF staff reported that the patient has been experiencing spiking fevers for the past 10 days.  A chest x-ray obtained at the SNF demonstrated infiltrates, prompting initiation of vancomycin for suspected pneumonia.  Her white blood cell count was elevated at 18,000/µL.  Urinalysis and urine culture were negative, as were blood cultures.  The patient has persistent anemia despite receiving weekly darbepoetin zachery (Aranesp) injections three times a week, with the last dose administered on Monday.  Her most recent hemoglobin level was 5.8 g/dL. Daughter on bedside also confirmed Diarrhea. PEG site evaluated shows surrounding cellulitis with pus. PEG flushing well. Patient was given tube feeds last night which were tolerated. CTAP shows PEG tube positioned in gastric lumen. No abscess. PEG lavage shows no blood. KAREN shows brown stool       #PEG tube with surrounding cellulitis   CTAP shows no burried bumper, no abscess. PEG tube positioned in gastric lumen  ON exam PEg site with surround cellulitis and pus from stoma  PEG flushing well. Received PEG feeds last night    Rec  ID recs appreciated. Cw IV abx per ID  No need to exchange PEG tube at this time   Can use PEG for feeds    #Acute on chronic anemia - no overt bleeding   #Hx MDS  HD stable no active bleeding  KAREN shows brown stool   PEG lavage shows no blood  Hgb 5.2-> 10 s/p 2 unit PRBC    Rec  Trend H/H  TRansfuse hgb target >7   IN the absence of overt bleeding, negative PEG lavage, and brown stool on KAREN do not recommend endoscopic evaluation at this time   Please alert GI if any overt bleeding      #colitis   Reported loose stools  CTAP: Mild edematous thickening of the colon with mucosal hyperemia can be   seen with colitis of infectious or inflammatory etiology. No bowel   obstruction.  Recent abx use    Rec  Send stool studies C. diff, GI PCR, stool OP  Monitor BMs and document in chart   A 79-year-old female with a complex medical history including myelodysplastic syndrome (MDS) followed by Dr. Wade (requiring periodic transfusions for anemia), diabetes mellitus, hypertension, chronic kidney disease, and multiple recent admissions for a right-sided subdural hematoma (SDH) with mass effect and midline shift (status-post evacuation and middle meningeal artery embolization complicated by a leaking craniotomy site and concern for status epilepticus) presented to the ED from her skilled nursing facility due to lethargy and fever. Gi consulted for PEG tube infection.       #PEG tube with surrounding cellulitis   PEG placed 11/12/2024. No complications. Unremarkable EGD.   CTAP shows no burried bumper, no abscess. PEG tube positioned in gastric lumen  ON exam PEg site with surround cellulitis and pus from stoma  PEG flushing well. Received PEG feeds last night    Rec  ID recs appreciated. Cw IV abx per ID  No need to exchange PEG tube at this time   Can use PEG for feeds    #Acute on chronic anemia - no overt bleeding   #Hx MDS  HD stable no active bleeding  KAREN shows brown stool   PEG lavage shows no blood  Hgb 5.2-> 10 s/p 2 unit PRBC    Rec  Trend H/H  TRansfuse hgb target >7   IN the absence of overt bleeding, negative PEG lavage, and brown stool on KAREN do not recommend endoscopic evaluation at this time   Please alert GI if any overt bleeding      #colitis - r/o infectious etiology  Reported loose stools  CTAP: Mild edematous thickening of the colon with mucosal hyperemia can be   seen with colitis of infectious or inflammatory etiology. No bowel   obstruction.  Recent abx use    Rec  Send stool studies C. diff, GI PCR, stool OP  Monitor BMs and document in chart   A 79-year-old female with a complex medical history including myelodysplastic syndrome (MDS) followed by Dr. Wade (requiring periodic transfusions for anemia), diabetes mellitus, hypertension, chronic kidney disease, and multiple recent admissions for a right-sided subdural hematoma (SDH) with mass effect and midline shift (status-post evacuation and middle meningeal artery embolization complicated by a leaking craniotomy site and concern for status epilepticus) presented to the ED from her skilled nursing facility due to lethargy and fever. Gi consulted for PEG tube infection.       #PEG tube with surrounding cellulitis   PEG placed 11/12/2024. No complications. Unremarkable EGD.   CTAP shows no buried bumper, no abscess. PEG tube positioned in gastric lumen  ON exam PEg site with surround cellulitis and pus from stoma  PEG flushing well. Received PEG feeds last night    Rec  ID recs appreciated. Cw IV abx per ID  No need to exchange PEG tube at this time  Monitor gastrostomy/peristomal site  Serial abd exams  Can use PEG for feeds as tolerated    #Acute on chronic anemia - no overt bleeding   #Hx MDS  HD stable no active bleeding  KAREN shows brown stool   PEG lavage shows no blood  Hgb 5.2-> 10 s/p 2 unit PRBC    Rec  Trend H/H  TRansfuse hgb target >7   IN the absence of overt bleeding, negative PEG lavage, and brown stool on KAREN do not recommend endoscopic evaluation at this time   Please alert GI if any overt bleeding      #colitis - r/o infectious etiology  Reported loose stools  CTAP: Mild edematous thickening of the colon with mucosal hyperemia can be   seen with colitis of infectious or inflammatory etiology. No bowel   obstruction.  Recent abx use    Rec  Send stool studies C. diff, GI PCR, stool OP  Monitor BMs and document in chart

## 2024-12-12 LAB
ALBUMIN FLD-MCNC: 1.2 G/DL — SIGNIFICANT CHANGE UP
ALBUMIN SERPL ELPH-MCNC: 2.8 G/DL — LOW (ref 3.5–5.2)
ALP SERPL-CCNC: 94 U/L — SIGNIFICANT CHANGE UP (ref 30–115)
ALT FLD-CCNC: 30 U/L — SIGNIFICANT CHANGE UP (ref 0–41)
ANION GAP SERPL CALC-SCNC: 12 MMOL/L — SIGNIFICANT CHANGE UP (ref 7–14)
ANION GAP SERPL CALC-SCNC: 13 MMOL/L — SIGNIFICANT CHANGE UP (ref 7–14)
ANION GAP SERPL CALC-SCNC: 15 MMOL/L — HIGH (ref 7–14)
APTT BLD: 35.5 SEC — SIGNIFICANT CHANGE UP (ref 27–39.2)
AST SERPL-CCNC: 37 U/L — SIGNIFICANT CHANGE UP (ref 0–41)
BASOPHILS # BLD AUTO: 0.24 K/UL — HIGH (ref 0–0.2)
BASOPHILS NFR BLD AUTO: 0.9 % — SIGNIFICANT CHANGE UP (ref 0–1)
BILIRUB SERPL-MCNC: 0.6 MG/DL — SIGNIFICANT CHANGE UP (ref 0.2–1.2)
BUN SERPL-MCNC: 46 MG/DL — HIGH (ref 10–20)
BUN SERPL-MCNC: 46 MG/DL — HIGH (ref 10–20)
BUN SERPL-MCNC: 48 MG/DL — HIGH (ref 10–20)
CALCIUM SERPL-MCNC: 9.2 MG/DL — SIGNIFICANT CHANGE UP (ref 8.4–10.5)
CALCIUM SERPL-MCNC: 9.5 MG/DL — SIGNIFICANT CHANGE UP (ref 8.4–10.5)
CALCIUM SERPL-MCNC: 9.9 MG/DL — SIGNIFICANT CHANGE UP (ref 8.4–10.5)
CHLORIDE SERPL-SCNC: 116 MMOL/L — HIGH (ref 98–110)
CO2 SERPL-SCNC: 19 MMOL/L — SIGNIFICANT CHANGE UP (ref 17–32)
CO2 SERPL-SCNC: 21 MMOL/L — SIGNIFICANT CHANGE UP (ref 17–32)
CO2 SERPL-SCNC: 21 MMOL/L — SIGNIFICANT CHANGE UP (ref 17–32)
CREAT SERPL-MCNC: 0.9 MG/DL — SIGNIFICANT CHANGE UP (ref 0.7–1.5)
CREAT SERPL-MCNC: 1 MG/DL — SIGNIFICANT CHANGE UP (ref 0.7–1.5)
CREAT SERPL-MCNC: 1 MG/DL — SIGNIFICANT CHANGE UP (ref 0.7–1.5)
EGFR: 58 ML/MIN/1.73M2 — LOW
EGFR: 58 ML/MIN/1.73M2 — LOW
EGFR: 65 ML/MIN/1.73M2 — SIGNIFICANT CHANGE UP
EOSINOPHIL # BLD AUTO: 0.29 K/UL — SIGNIFICANT CHANGE UP (ref 0–0.7)
EOSINOPHIL NFR BLD AUTO: 1.1 % — SIGNIFICANT CHANGE UP (ref 0–8)
GLUCOSE BLDC GLUCOMTR-MCNC: 168 MG/DL — HIGH (ref 70–99)
GLUCOSE BLDC GLUCOMTR-MCNC: 189 MG/DL — HIGH (ref 70–99)
GLUCOSE BLDC GLUCOMTR-MCNC: 215 MG/DL — HIGH (ref 70–99)
GLUCOSE BLDC GLUCOMTR-MCNC: 253 MG/DL — HIGH (ref 70–99)
GLUCOSE FLD-MCNC: 228 MG/DL — SIGNIFICANT CHANGE UP
GLUCOSE SERPL-MCNC: 172 MG/DL — HIGH (ref 70–99)
GLUCOSE SERPL-MCNC: 189 MG/DL — HIGH (ref 70–99)
GLUCOSE SERPL-MCNC: 200 MG/DL — HIGH (ref 70–99)
HCT VFR BLD CALC: 27.9 % — LOW (ref 37–47)
HGB BLD-MCNC: 9.2 G/DL — LOW (ref 12–16)
IMM GRANULOCYTES NFR BLD AUTO: 1.3 % — HIGH (ref 0.1–0.3)
INR BLD: 1.25 RATIO — SIGNIFICANT CHANGE UP (ref 0.65–1.3)
LDH SERPL L TO P-CCNC: 51 U/L — SIGNIFICANT CHANGE UP
LYMPHOCYTES # BLD AUTO: 2.28 K/UL — SIGNIFICANT CHANGE UP (ref 1.2–3.4)
LYMPHOCYTES # BLD AUTO: 8.9 % — LOW (ref 20.5–51.1)
MAGNESIUM SERPL-MCNC: 1.9 MG/DL — SIGNIFICANT CHANGE UP (ref 1.8–2.4)
MCHC RBC-ENTMCNC: 30.3 PG — SIGNIFICANT CHANGE UP (ref 27–31)
MCHC RBC-ENTMCNC: 33 G/DL — SIGNIFICANT CHANGE UP (ref 32–37)
MCV RBC AUTO: 91.8 FL — SIGNIFICANT CHANGE UP (ref 81–99)
MONOCYTES # BLD AUTO: 2.32 K/UL — HIGH (ref 0.1–0.6)
MONOCYTES NFR BLD AUTO: 9.1 % — SIGNIFICANT CHANGE UP (ref 1.7–9.3)
MRSA PCR RESULT.: NEGATIVE — SIGNIFICANT CHANGE UP
NEUTROPHILS # BLD AUTO: 20.15 K/UL — HIGH (ref 1.4–6.5)
NEUTROPHILS NFR BLD AUTO: 78.7 % — HIGH (ref 42.2–75.2)
NRBC # BLD: 0 /100 WBCS — SIGNIFICANT CHANGE UP (ref 0–0)
PHOSPHATE SERPL-MCNC: 6.5 MG/DL — HIGH (ref 2.1–4.9)
PLATELET # BLD AUTO: 332 K/UL — SIGNIFICANT CHANGE UP (ref 130–400)
PMV BLD: 12 FL — HIGH (ref 7.4–10.4)
POTASSIUM SERPL-MCNC: 4.5 MMOL/L — SIGNIFICANT CHANGE UP (ref 3.5–5)
POTASSIUM SERPL-MCNC: 4.9 MMOL/L — SIGNIFICANT CHANGE UP (ref 3.5–5)
POTASSIUM SERPL-MCNC: 5.6 MMOL/L — HIGH (ref 3.5–5)
POTASSIUM SERPL-SCNC: 4.5 MMOL/L — SIGNIFICANT CHANGE UP (ref 3.5–5)
POTASSIUM SERPL-SCNC: 4.9 MMOL/L — SIGNIFICANT CHANGE UP (ref 3.5–5)
POTASSIUM SERPL-SCNC: 5.6 MMOL/L — HIGH (ref 3.5–5)
PROT FLD-MCNC: 2.1 G/DL — SIGNIFICANT CHANGE UP
PROT SERPL-MCNC: 4.7 G/DL — LOW (ref 6–8)
PROTHROM AB SERPL-ACNC: 14.8 SEC — HIGH (ref 9.95–12.87)
RBC # BLD: 3.04 M/UL — LOW (ref 4.2–5.4)
RBC # FLD: 15.7 % — HIGH (ref 11.5–14.5)
SODIUM SERPL-SCNC: 149 MMOL/L — HIGH (ref 135–146)
SODIUM SERPL-SCNC: 150 MMOL/L — HIGH (ref 135–146)
SODIUM SERPL-SCNC: 150 MMOL/L — HIGH (ref 135–146)
WBC # BLD: 25.61 K/UL — HIGH (ref 4.8–10.8)
WBC # FLD AUTO: 25.61 K/UL — HIGH (ref 4.8–10.8)

## 2024-12-12 PROCEDURE — 71275 CT ANGIOGRAPHY CHEST: CPT | Mod: 26

## 2024-12-12 PROCEDURE — 99233 SBSQ HOSP IP/OBS HIGH 50: CPT

## 2024-12-12 RX ORDER — 0.9 % SODIUM CHLORIDE 0.9 %
1000 INTRAVENOUS SOLUTION INTRAVENOUS
Refills: 0 | Status: DISCONTINUED | OUTPATIENT
Start: 2024-12-12 | End: 2024-12-16

## 2024-12-12 RX ORDER — METRONIDAZOLE 500 MG/1
500 TABLET ORAL THREE TIMES A DAY
Refills: 0 | Status: DISCONTINUED | OUTPATIENT
Start: 2024-12-12 | End: 2024-12-16

## 2024-12-12 RX ORDER — FAMOTIDINE 20 MG/1
40 TABLET, FILM COATED ORAL
Refills: 0 | Status: DISCONTINUED | OUTPATIENT
Start: 2024-12-12 | End: 2024-12-12

## 2024-12-12 RX ORDER — VITAMIN A
1 CREAM (GRAM) TOPICAL
Refills: 0 | Status: DISCONTINUED | OUTPATIENT
Start: 2024-12-12 | End: 2024-12-16

## 2024-12-12 RX ORDER — AMANTADINE HCL 100 MG
50 CAPSULE ORAL
Refills: 0 | Status: DISCONTINUED | OUTPATIENT
Start: 2024-12-12 | End: 2024-12-16

## 2024-12-12 RX ORDER — PYRIDOXINE HCL (VITAMIN B6) 25 MG
100 TABLET ORAL DAILY
Refills: 0 | Status: DISCONTINUED | OUTPATIENT
Start: 2024-12-12 | End: 2024-12-16

## 2024-12-12 RX ORDER — SODIUM CHLORIDE 9 MG/ML
1000 INJECTION, SOLUTION INTRAMUSCULAR; INTRAVENOUS; SUBCUTANEOUS ONCE
Refills: 0 | Status: DISCONTINUED | OUTPATIENT
Start: 2024-12-12 | End: 2024-12-12

## 2024-12-12 RX ORDER — ENOXAPARIN SODIUM 30 MG/.3ML
30 INJECTION SUBCUTANEOUS EVERY 24 HOURS
Refills: 0 | Status: DISCONTINUED | OUTPATIENT
Start: 2024-12-12 | End: 2024-12-16

## 2024-12-12 RX ORDER — FAMOTIDINE 20 MG/1
20 TABLET, FILM COATED ORAL DAILY
Refills: 0 | Status: DISCONTINUED | OUTPATIENT
Start: 2024-12-12 | End: 2024-12-16

## 2024-12-12 RX ORDER — LABETALOL 100 MG/1
100 TABLET, FILM COATED ORAL EVERY 8 HOURS
Refills: 0 | Status: DISCONTINUED | OUTPATIENT
Start: 2024-12-12 | End: 2024-12-13

## 2024-12-12 RX ORDER — SODIUM ZIRCONIUM CYCLOSILICATE 5 G/5G
5 POWDER, FOR SUSPENSION ORAL ONCE
Refills: 0 | Status: COMPLETED | OUTPATIENT
Start: 2024-12-12 | End: 2024-12-12

## 2024-12-12 RX ORDER — GLUCAGON INJECTION, SOLUTION 0.5 MG/.1ML
1 INJECTION, SOLUTION SUBCUTANEOUS ONCE
Refills: 0 | Status: DISCONTINUED | OUTPATIENT
Start: 2024-12-12 | End: 2024-12-16

## 2024-12-12 RX ORDER — ACETAMINOPHEN 500MG 500 MG/1
1000 TABLET, COATED ORAL ONCE
Refills: 0 | Status: COMPLETED | OUTPATIENT
Start: 2024-12-12 | End: 2024-12-12

## 2024-12-12 RX ORDER — CHLORHEXIDINE GLUCONATE 1.2 MG/ML
1 RINSE ORAL
Refills: 0 | Status: DISCONTINUED | OUTPATIENT
Start: 2024-12-12 | End: 2024-12-16

## 2024-12-12 RX ADMIN — Medication 50 MILLIGRAM(S): at 06:19

## 2024-12-12 RX ADMIN — ENOXAPARIN SODIUM 30 MILLIGRAM(S): 30 INJECTION SUBCUTANEOUS at 09:10

## 2024-12-12 RX ADMIN — LABETALOL 100 MILLIGRAM(S): 100 TABLET, FILM COATED ORAL at 17:06

## 2024-12-12 RX ADMIN — METRONIDAZOLE 500 MILLIGRAM(S): 500 TABLET ORAL at 22:22

## 2024-12-12 RX ADMIN — SODIUM ZIRCONIUM CYCLOSILICATE 5 GRAM(S): 5 POWDER, FOR SUSPENSION ORAL at 09:11

## 2024-12-12 RX ADMIN — ACETAMINOPHEN 500MG 400 MILLIGRAM(S): 500 TABLET, COATED ORAL at 09:06

## 2024-12-12 RX ADMIN — LACOSAMIDE 140 MILLIGRAM(S): 10 INJECTION INTRAVENOUS at 18:01

## 2024-12-12 RX ADMIN — LACOSAMIDE 140 MILLIGRAM(S): 10 INJECTION INTRAVENOUS at 06:20

## 2024-12-12 RX ADMIN — Medication 1 APPLICATION(S): at 17:11

## 2024-12-12 RX ADMIN — LACOSAMIDE 140 MILLIGRAM(S): 10 INJECTION INTRAVENOUS at 00:23

## 2024-12-12 RX ADMIN — LEVETIRACETAM 1500 MILLIGRAM(S): 1000 TABLET ORAL at 17:16

## 2024-12-12 RX ADMIN — CHLORHEXIDINE GLUCONATE 1 APPLICATION(S): 1.2 RINSE ORAL at 09:06

## 2024-12-12 RX ADMIN — Medication 100 MILLIGRAM(S): at 11:23

## 2024-12-12 RX ADMIN — Medication 6: at 11:52

## 2024-12-12 RX ADMIN — Medication 250 MILLIGRAM(S): at 05:42

## 2024-12-12 RX ADMIN — Medication 2: at 17:13

## 2024-12-12 RX ADMIN — CEFEPIME 100 MILLIGRAM(S): 2 INJECTION, POWDER, FOR SOLUTION INTRAVENOUS at 17:11

## 2024-12-12 RX ADMIN — ACETAMINOPHEN 500MG 1000 MILLIGRAM(S): 500 TABLET, COATED ORAL at 00:24

## 2024-12-12 RX ADMIN — Medication 100 MILLILITER(S): at 05:41

## 2024-12-12 RX ADMIN — LABETALOL 100 MILLIGRAM(S): 100 TABLET, FILM COATED ORAL at 22:13

## 2024-12-12 RX ADMIN — CEFEPIME 100 MILLIGRAM(S): 2 INJECTION, POWDER, FOR SOLUTION INTRAVENOUS at 06:20

## 2024-12-12 RX ADMIN — METRONIDAZOLE 500 MILLIGRAM(S): 500 TABLET ORAL at 13:04

## 2024-12-12 RX ADMIN — Medication 1 APPLICATION(S): at 17:07

## 2024-12-12 RX ADMIN — ACETAMINOPHEN 500MG 1000 MILLIGRAM(S): 500 TABLET, COATED ORAL at 09:32

## 2024-12-12 RX ADMIN — PANTOPRAZOLE SODIUM 40 MILLIGRAM(S): 40 TABLET, DELAYED RELEASE ORAL at 06:20

## 2024-12-12 RX ADMIN — METRONIDAZOLE 500 MILLIGRAM(S): 500 TABLET ORAL at 06:21

## 2024-12-12 RX ADMIN — Medication 50 MILLIGRAM(S): at 17:06

## 2024-12-12 RX ADMIN — LEVETIRACETAM 1500 MILLIGRAM(S): 1000 TABLET ORAL at 06:20

## 2024-12-12 NOTE — PHARMACOTHERAPY INTERVENTION NOTE - COMMENTS
Recommended to initiate metronidazole 500 mg PO via PEG q8h for colitis as per ID.      Nelson VerdinD   Clinical Pharmacy Specialist, Infectious Diseases  Tele-Antimicrobial Stewardship Program (Tele-ASP)  Tele-ASP Phone: [phone]

## 2024-12-12 NOTE — ADVANCED PRACTICE NURSE CONSULT - RECOMMEDATIONS
Cleanse wound with soap and water, pat dry.  Apply Triad cream twice daily PRN for soiling to sacrococcygeal area, groin, buttock and thighs.  Please cleanse under PEG site with Vashe twice daily PRN for soiling and pat dry.  Patient will benefit from fecal incontinence  bag due to multiple episodes of liquid diarrhea    Skin and incontinence care.  Pressure Injury Prevention.  Assess wound daily and inform primary provider of any changes.   Case discussed with primary RN.  Wound/ ostomy specialist to f/u as needed.   Other recommendations per Primary Team.

## 2024-12-12 NOTE — PROGRESS NOTE ADULT - SUBJECTIVE AND OBJECTIVE BOX
Patient is a 79y old  Female who presents with a chief complaint of       SUBJECTIVE:  She was sleeping, and arousable  In no distress     REVIEW OF SYSTEMS:    All Pertinent ROS are negative except per HPI       PHYSICAL EXAM  Vital Signs Last 24 Hrs  T(C): 36.8 (12 Dec 2024 04:22), Max: 36.8 (12 Dec 2024 04:22)  T(F): 98.3 (12 Dec 2024 04:22), Max: 98.3 (12 Dec 2024 04:22)  HR: 102 (12 Dec 2024 10:11) (102 - 104)  BP: 150/68 (12 Dec 2024 10:11) (103/60 - 151/75)  BP(mean): 93 (12 Dec 2024 10:11) (93 - 94)  RR: 18 (12 Dec 2024 10:11) (18 - 20)  SpO2: 93% (12 Dec 2024 10:11) (93% - 100%)    Parameters below as of 12 Dec 2024 10:11  Patient On (Oxygen Delivery Method): nasal cannula  O2 Flow (L/min): 1      CONSTITUTIONAL:  Well nourished.  NAD    ENT:   Airway patent,   No thrush    CARDIAC:   Normal rate,   regular rhythm.    no edema      RESPIRATORY:   NO Wheezing  Nnormal chest expansion  Nnot tachypneic,  No use of accessory muscles    GASTROINTESTINAL:  Abdomen soft,   non-tender,   no guarding,   + BS          12-12-24 @ 07:01  -  12-12-24 @ 12:19  --------------------------------------------------------  IN:  Total IN: 0 mL    OUT:    Indwelling Catheter - Urethral (mL): 700 mL  Total OUT: 700 mL    Total NET: -700 mL          LABS:                          9.2    25.61 )-----------( 332      ( 12 Dec 2024 06:03 )             27.9                                               12-12    150[H]  |  116[H]  |  46[H]  ----------------------------<  200[H]  4.9   |  21  |  1.0    Ca    9.5      12 Dec 2024 06:03  Phos  6.5     12-12  Mg     1.9     12-12    TPro  4.7[L]  /  Alb  2.8[L]  /  TBili  0.6  /  DBili  x   /  AST  37  /  ALT  30  /  AlkPhos  94  12-12      PT/INR - ( 12 Dec 2024 06:03 )   PT: 14.80 sec;   INR: 1.25 ratio         PTT - ( 12 Dec 2024 06:03 )  PTT:35.5 sec                                       Urinalysis Basic - ( 12 Dec 2024 06:03 )    Color: x / Appearance: x / SG: x / pH: x  Gluc: 200 mg/dL / Ketone: x  / Bili: x / Urobili: x   Blood: x / Protein: x / Nitrite: x   Leuk Esterase: x / RBC: x / WBC x   Sq Epi: x / Non Sq Epi: x / Bacteria: x                                                  LIVER FUNCTIONS - ( 12 Dec 2024 06:03 )  Alb: 2.8 g/dL / Pro: 4.7 g/dL / ALK PHOS: 94 U/L / ALT: 30 U/L / AST: 37 U/L / GGT: x                                                  Culture - Body Fluid with Gram Stain (collected 11 Dec 2024 10:26)  Source: Pleural Fl  Gram Stain (11 Dec 2024 20:35):    polymorphonuclear leukocytes seen    No organisms seen    by cytocentrifuge    Urinalysis with Rflx Culture (collected 10 Dec 2024 15:00)    Culture - Blood (collected 10 Dec 2024 14:01)  Source: .Blood BLOOD  Preliminary Report (11 Dec 2024 23:08):    No growth at 24 hours    Culture - Blood (collected 10 Dec 2024 14:01)  Source: .Blood BLOOD  Preliminary Report (11 Dec 2024 23:08):    No growth at 24 hours                                                    MEDICATIONS  (STANDING):  amantadine Syrup 50 milliGRAM(s) Oral two times a day  cefepime   IVPB 1000 milliGRAM(s) IV Intermittent every 12 hours  chlorhexidine 2% Cloths 1 Application(s) Topical <User Schedule>  dextrose 5%. 1000 milliLiter(s) (50 mL/Hr) IV Continuous <Continuous>  dextrose 5%. 1000 milliLiter(s) (100 mL/Hr) IV Continuous <Continuous>  dextrose 50% Injectable 25 Gram(s) IV Push once  dextrose 50% Injectable 12.5 Gram(s) IV Push once  dextrose 50% Injectable 25 Gram(s) IV Push once  enoxaparin Injectable 30 milliGRAM(s) SubCutaneous every 24 hours  glucagon  Injectable 1 milliGRAM(s) IntraMuscular once  insulin lispro (ADMELOG) corrective regimen sliding scale   SubCutaneous three times a day before meals  lacosamide IVPB 200 milliGRAM(s) IV Intermittent every 12 hours  levETIRAcetam   Injectable 1500 milliGRAM(s) IV Push every 12 hours  metroNIDAZOLE    Tablet 500 milliGRAM(s) Oral three times a day  mupirocin 2% Ointment 1 Application(s) Topical every 12 hours  pantoprazole  Injectable 40 milliGRAM(s) IV Push every 12 hours  pyridoxine 100 milliGRAM(s) Oral daily  sodium chloride 0.45%. 1000 milliLiter(s) (100 mL/Hr) IV Continuous <Continuous>  vancomycin  IVPB 1000 milliGRAM(s) IV Intermittent every 24 hours  vitamin A & D Ointment 1 Application(s) Topical two times a day    MEDICATIONS  (PRN):  dextrose Oral Gel 15 Gram(s) Oral once PRN Blood Glucose LESS THAN 70 milliGRAM(s)/deciliter       Patient is a 79y old  Female who presents with a chief complaint of       SUBJECTIVE;  SP thoracentesis right side.  Off pressors.       REVIEW OF SYSTEMS:    All Pertinent ROS are negative except per HPI       PHYSICAL EXAM  Vital Signs Last 24 Hrs  T(C): 36.8 (12 Dec 2024 04:22), Max: 36.8 (12 Dec 2024 04:22)  T(F): 98.3 (12 Dec 2024 04:22), Max: 98.3 (12 Dec 2024 04:22)  HR: 102 (12 Dec 2024 10:11) (102 - 104)  BP: 150/68 (12 Dec 2024 10:11) (103/60 - 151/75)  BP(mean): 93 (12 Dec 2024 10:11) (93 - 94)  RR: 18 (12 Dec 2024 10:11) (18 - 20)  SpO2: 93% (12 Dec 2024 10:11) (93% - 100%)    Parameters below as of 12 Dec 2024 10:11  Patient On (Oxygen Delivery Method): nasal cannula  O2 Flow (L/min): 1      CONSTITUTIONAL:  Ill appearing NAD    ENT:   Airway patent,       CARDIAC:   Normal rate,   regular rhythm.          RESPIRATORY:   NO Wheezing  Normal chest expansion  Not tachypneic,  No use of accessory muscles    GASTROINTESTINAL:  Abdomen soft,   non-tender,   no guarding,   + BS          12-12-24 @ 07:01  -  12-12-24 @ 12:19  --------------------------------------------------------  IN:  Total IN: 0 mL    OUT:    Indwelling Catheter - Urethral (mL): 700 mL  Total OUT: 700 mL    Total NET: -700 mL          LABS:                          9.2    25.61 )-----------( 332      ( 12 Dec 2024 06:03 )             27.9                                               12-12    150[H]  |  116[H]  |  46[H]  ----------------------------<  200[H]  4.9   |  21  |  1.0    Ca    9.5      12 Dec 2024 06:03  Phos  6.5     12-12  Mg     1.9     12-12    TPro  4.7[L]  /  Alb  2.8[L]  /  TBili  0.6  /  DBili  x   /  AST  37  /  ALT  30  /  AlkPhos  94  12-12      PT/INR - ( 12 Dec 2024 06:03 )   PT: 14.80 sec;   INR: 1.25 ratio         PTT - ( 12 Dec 2024 06:03 )  PTT:35.5 sec                                       Urinalysis Basic - ( 12 Dec 2024 06:03 )    Color: x / Appearance: x / SG: x / pH: x  Gluc: 200 mg/dL / Ketone: x  / Bili: x / Urobili: x   Blood: x / Protein: x / Nitrite: x   Leuk Esterase: x / RBC: x / WBC x   Sq Epi: x / Non Sq Epi: x / Bacteria: x                                                  LIVER FUNCTIONS - ( 12 Dec 2024 06:03 )  Alb: 2.8 g/dL / Pro: 4.7 g/dL / ALK PHOS: 94 U/L / ALT: 30 U/L / AST: 37 U/L / GGT: x                                                  Culture - Body Fluid with Gram Stain (collected 11 Dec 2024 10:26)  Source: Pleural Fl  Gram Stain (11 Dec 2024 20:35):    polymorphonuclear leukocytes seen    No organisms seen    by cytocentrifuge    Urinalysis with Rflx Culture (collected 10 Dec 2024 15:00)    Culture - Blood (collected 10 Dec 2024 14:01)  Source: .Blood BLOOD  Preliminary Report (11 Dec 2024 23:08):    No growth at 24 hours    Culture - Blood (collected 10 Dec 2024 14:01)  Source: .Blood BLOOD  Preliminary Report (11 Dec 2024 23:08):    No growth at 24 hours                                                    MEDICATIONS  (STANDING):  amantadine Syrup 50 milliGRAM(s) Oral two times a day  cefepime   IVPB 1000 milliGRAM(s) IV Intermittent every 12 hours  chlorhexidine 2% Cloths 1 Application(s) Topical <User Schedule>  dextrose 5%. 1000 milliLiter(s) (50 mL/Hr) IV Continuous <Continuous>  dextrose 5%. 1000 milliLiter(s) (100 mL/Hr) IV Continuous <Continuous>  dextrose 50% Injectable 25 Gram(s) IV Push once  dextrose 50% Injectable 12.5 Gram(s) IV Push once  dextrose 50% Injectable 25 Gram(s) IV Push once  enoxaparin Injectable 30 milliGRAM(s) SubCutaneous every 24 hours  glucagon  Injectable 1 milliGRAM(s) IntraMuscular once  insulin lispro (ADMELOG) corrective regimen sliding scale   SubCutaneous three times a day before meals  lacosamide IVPB 200 milliGRAM(s) IV Intermittent every 12 hours  levETIRAcetam   Injectable 1500 milliGRAM(s) IV Push every 12 hours  metroNIDAZOLE    Tablet 500 milliGRAM(s) Oral three times a day  mupirocin 2% Ointment 1 Application(s) Topical every 12 hours  pantoprazole  Injectable 40 milliGRAM(s) IV Push every 12 hours  pyridoxine 100 milliGRAM(s) Oral daily  sodium chloride 0.45%. 1000 milliLiter(s) (100 mL/Hr) IV Continuous <Continuous>  vancomycin  IVPB 1000 milliGRAM(s) IV Intermittent every 24 hours  vitamin A & D Ointment 1 Application(s) Topical two times a day    MEDICATIONS  (PRN):  dextrose Oral Gel 15 Gram(s) Oral once PRN Blood Glucose LESS THAN 70 milliGRAM(s)/deciliter

## 2024-12-12 NOTE — PROGRESS NOTE ADULT - ASSESSMENT
A 79-year-old female with a complex medical history including myelodysplastic syndrome (MDS) followed by Dr. Wade (requiring periodic transfusions for anemia), diabetes mellitus, hypertension, chronic kidney disease, and multiple recent admissions for a right-sided subdural hematoma (SDH) with mass effect and midline shift (status-post evacuation and middle meningeal artery embolization complicated by a leaking craniotomy site and concern for status epilepticus) presented to the ED from her skilled nursing facility due to lethargy and fever. Gi consulted for PEG tube infection.       #PEG tube with surrounding cellulitis   PEG placed 11/12/2024. No complications. Unremarkable EGD.   CTAP shows no buried bumper, no abscess. PEG tube positioned in gastric lumen  ON exam PEg site with surround cellulitis and pus from stoma  PEG flushing well. Received PEG feeds last night    Rec  ID recs appreciated. Cw IV abx per ID  No need to exchange PEG tube at this time  Monitor gastrostomy/peristomal site  Serial abd exams  Can use PEG for feeds as tolerated  Clean the PEG site with soap and water daily  Apply clean dressing to the site  Tape the tube to the skin and abdominal binder to avoid tugging  Flush the tube with 30cc of water before and after feeds and medications    #colitis - r/o infectious etiology  Reported loose stools  CTAP: Mild edematous thickening of the colon with mucosal hyperemia can be   seen with colitis of infectious or inflammatory etiology. No bowel   obstruction.  Recent abx use    Rec  Send stool studies C. diff, GI PCR, stool OP  Monitor BMs and document in chart      #Acute on chronic anemia - no overt bleeding   #Hx MDS  HD stable no active bleeding  KAREN shows brown stool   PEG lavage shows no blood  Hgb 5.2-> 10 s/p 2 unit PRBC    Rec  Trend H/H  TRansfuse hgb target >7   IN the absence of overt bleeding, negative PEG lavage, and brown stool on KAREN do not recommend endoscopic evaluation at this time   Please alert GI if any overt bleeding

## 2024-12-12 NOTE — PATIENT PROFILE ADULT - NSPRONUTRITIONRISK_GEN_A_NUR
Attempted to reach out to patient's parents in regards to getting an appointment set up with them to see any MD. No answer. Left voicemail with clinic number for them to call back.  
Pressure injury stage 2 or greater

## 2024-12-12 NOTE — PROGRESS NOTE ADULT - ASSESSMENT
79-year-old female with a complex medical history including myelodysplastic syndrome (MDS) followed by Dr. Wade (requiring periodic transfusions for anemia), diabetes mellitus, hypertension, chronic kidney disease, and multiple recent admissions for a right-sided subdural hematoma (SDH) with mass effect and midline shift (status-post evacuation and middle meningeal artery embolization complicated by a leaking craniotomy site and concern for status epilepticus) presented to the ED from her skilled nursing facility (SNF) due to lethargy and fever.     IMPRESSION/RECOMMENDATIONS  Immunosuppression/Immunosenescence ( above age 60 yrs there is a exponential decline in immunity which could result in poor clinical outcomes.  Acute illness ( sepsis/UTI/PNA )  which poses a threat to life or bodily function without treatment   Sepsis ( /m, WBC 15.6 >25.6)  Hypoxic respiratory failure   PNA : bibasilar  12/12 CT angio p  < from: CT Chest w/ IV Cont (12.10.24 @ 14:31) > ( Independent interpretation of test : PNA  1.  Large bilateral pleural effusions with adjacent compressive   atelectasis resulting in near complete left and complete rightlower lobe   collapse.  2.  Mild edematous thickening of the colon with mucosal hyperemia can be   seen with colitis of infectious or inflammatory etiology. No bowel   obstruction.    12/10 CXR opacites b/l. ( Independent interpretation of test : PNA  12/11 S/p R thoracocentesis : 1000 ccs of fluid. NG cultures. Independent analysis of CX results and interpretation.    Acute pyelonephritis with no obstructive uropathy   Pyuria   12/10 UCx C albicans. Independent analysis of CX results and interpretation     Colitis on CT with diarrhea. CD NG    Pressure related sacral ulcer : non infected. Off loading    G tube site : has cellulitis. R/o associated abscess        -monitor WBC  -Urine for strep pneumonia/legionella antigen  -Nares ORSA  -Sputum gm stain, cultures  -BCx   -Off loading to prevent pressure sores and preventive measures to avoid aspiration  -GI evaluation of G tube site  -Vancomycin 1 gm iv q12h. Vancomycin will be monitored daily for nephrotoxicity by checking the GFR  -Cefepime 2 gm iv q8h  -Flagyl 500 mg via G tube q8h

## 2024-12-12 NOTE — PROGRESS NOTE ADULT - ASSESSMENT
Impression     Bilateral Pleural effusion s/p Tight thora   POCUS of the left has a pocket of simple anechoic fluid       Plan     Wean O2  Aspiration precautions  Will consider a thora if still symptomatic    Impression     Bilateral Pleural effusion s/p Tight thora - transudate  POCUS of the left has a pocket of simple anechoic fluid       Plan     Wean O2  Aspiration precautions  Will consider a thora if still symptomatic    IMPRESSION:    UTI  HO E faecalis  UTI   Hypernatremia  Symptomatic anemia  HO MDS  Bilateral pleural effusions R>L.  SP right thoracentesis.    HO SDH SP craniotomy and MMA embolization   SP PEG   HO DM and HTN     PLAN:    CNS:  CTH noted.  FU EEG.  Avoid depressants     HEENT: Oral care.      PULMONARY:  HOB @ 45 degrees.  Aspiration precautions.  CT chest noted.  Follow up PFA     CARDIOVASCULAR:  Avoid overload. GDFR     GI: GI prophylaxis.  PEG Feeding.  Free h2O to correct free h2O deficit.         RENAL:  Follow up lytes.  Correct as needed.  Monitor UO.  Follow up lytes      INFECTIOUS DISEASE: Follow up cultures.  ABX per ID. FU Procal.      HEMATOLOGICAL:  DVT prophylaxis.  FU CBC and Coags.  Keep Hg > 7     ENDOCRINE:  Follow up FS.  Insulin protocol if needed.    MUSCULOSKELETAL:  Off loading.  Bed rest     GOC     Recall if status changes.

## 2024-12-12 NOTE — PROGRESS NOTE ADULT - SUBJECTIVE AND OBJECTIVE BOX
SIMONA KUHN  79y, Female    All available historical data reviewed    OVERNIGHT EVENTS:  none    ROS:  unable to obtain history secondary to patient's mental status and/or sedation  NC 3 LIT  one BM  ocampo in place    VITALS:  T(F): 98.3, Max: 98.3 (12-12-24 @ 04:22)  HR: 102  BP: 150/68  RR: 18Vital Signs Last 24 Hrs  T(C): 36.8 (12 Dec 2024 04:22), Max: 36.8 (12 Dec 2024 04:22)  T(F): 98.3 (12 Dec 2024 04:22), Max: 98.3 (12 Dec 2024 04:22)  HR: 102 (12 Dec 2024 10:11) (102 - 104)  BP: 150/68 (12 Dec 2024 10:11) (103/60 - 151/75)  BP(mean): 93 (12 Dec 2024 10:11) (93 - 94)  RR: 18 (12 Dec 2024 10:11) (18 - 20)  SpO2: 94% (12 Dec 2024 13:20) (93% - 100%)    Parameters below as of 12 Dec 2024 13:20  Patient On (Oxygen Delivery Method): room air        TESTS & MEASUREMENTS:                        9.2    25.61 )-----------( 332      ( 12 Dec 2024 06:03 )             27.9     12-12    150[H]  |  116[H]  |  46[H]  ----------------------------<  200[H]  4.9   |  21  |  1.0    Ca    9.5      12 Dec 2024 06:03  Phos  6.5     12-12  Mg     1.9     12-12    TPro  4.7[L]  /  Alb  2.8[L]  /  TBili  0.6  /  DBili  x   /  AST  37  /  ALT  30  /  AlkPhos  94  12-12    LIVER FUNCTIONS - ( 12 Dec 2024 06:03 )  Alb: 2.8 g/dL / Pro: 4.7 g/dL / ALK PHOS: 94 U/L / ALT: 30 U/L / AST: 37 U/L / GGT: x             Culture - Fungal, Body Fluid (collected 12-11-24 @ 10:26)  Source: Pleural Fl  Preliminary Report (12-12-24 @ 13:18):    Testing in progress    Culture - Body Fluid with Gram Stain (collected 12-11-24 @ 10:26)  Source: Pleural Fl  Gram Stain (12-11-24 @ 20:35):    polymorphonuclear leukocytes seen    No organisms seen    by cytocentrifuge  Preliminary Report (12-12-24 @ 12:44):    No growth to date    Culture - Urine (collected 12-10-24 @ 15:00)  Source: Catheterized None  Final Report (12-12-24 @ 13:43):    10,000 - 49,000 CFU/mL Candida albicans    "Susceptibilities not performed"    Urinalysis with Rflx Culture (collected 12-10-24 @ 15:00)    Culture - Blood (collected 12-10-24 @ 14:01)  Source: .Blood BLOOD  Preliminary Report (12-11-24 @ 23:08):    No growth at 24 hours    Culture - Blood (collected 12-10-24 @ 14:01)  Source: .Blood BLOOD  Preliminary Report (12-11-24 @ 23:08):    No growth at 24 hours      Urinalysis Basic - ( 12 Dec 2024 06:03 )    Color: x / Appearance: x / SG: x / pH: x  Gluc: 200 mg/dL / Ketone: x  / Bili: x / Urobili: x   Blood: x / Protein: x / Nitrite: x   Leuk Esterase: x / RBC: x / WBC x   Sq Epi: x / Non Sq Epi: x / Bacteria: x          Social History:  Tobacco Use: No  Alcohol Use: No  Drug Use: No    RADIOLOGY & ADDITIONAL TESTS:  Personal review of radiological diagnostics performed  Echo and EKG results noted when applicable.     MEDICATIONS:  amantadine Syrup 50 milliGRAM(s) Oral two times a day  cefepime   IVPB 1000 milliGRAM(s) IV Intermittent every 12 hours  chlorhexidine 2% Cloths 1 Application(s) Topical <User Schedule>  dextrose 5%. 1000 milliLiter(s) IV Continuous <Continuous>  dextrose 5%. 1000 milliLiter(s) IV Continuous <Continuous>  dextrose 50% Injectable 25 Gram(s) IV Push once  dextrose 50% Injectable 12.5 Gram(s) IV Push once  dextrose 50% Injectable 25 Gram(s) IV Push once  dextrose Oral Gel 15 Gram(s) Oral once PRN  enoxaparin Injectable 30 milliGRAM(s) SubCutaneous every 24 hours  glucagon  Injectable 1 milliGRAM(s) IntraMuscular once  insulin lispro (ADMELOG) corrective regimen sliding scale   SubCutaneous three times a day before meals  lacosamide IVPB 200 milliGRAM(s) IV Intermittent every 12 hours  levETIRAcetam   Injectable 1500 milliGRAM(s) IV Push every 12 hours  metroNIDAZOLE    Tablet 500 milliGRAM(s) Oral three times a day  mupirocin 2% Ointment 1 Application(s) Topical every 12 hours  pyridoxine 100 milliGRAM(s) Oral daily  sodium chloride 0.45%. 1000 milliLiter(s) IV Continuous <Continuous>  vancomycin  IVPB 1000 milliGRAM(s) IV Intermittent every 24 hours  vitamin A & D Ointment 1 Application(s) Topical two times a day      ANTIBIOTICS:  cefepime   IVPB 1000 milliGRAM(s) IV Intermittent every 12 hours  metroNIDAZOLE    Tablet 500 milliGRAM(s) Oral three times a day  vancomycin  IVPB 1000 milliGRAM(s) IV Intermittent every 24 hours

## 2024-12-12 NOTE — PATIENT PROFILE ADULT - FALL HARM RISK - HARM RISK INTERVENTIONS

## 2024-12-12 NOTE — PROGRESS NOTE ADULT - SUBJECTIVE AND OBJECTIVE BOX
SUBJECTIVE/OVERNIGHT EVENTS  Today is hospital day 2d. This morning patient was seen and examined at bedside. Patient not waking to sternal rub or verbal stimuli. vitals wnl on 1L NC. Small catheter inserted.  No acute or major events overnight.  Patient is non-verbal, unable to obtain information from her.      HPI:  A 79-year-old female with a complex medical history including myelodysplastic syndrome (MDS) followed by Dr. Wade (requiring periodic transfusions for anemia), diabetes mellitus, hypertension, chronic kidney disease, and multiple recent admissions for a right-sided subdural hematoma (SDH) with mass effect and midline shift (status-post evacuation and middle meningeal artery embolization complicated by a leaking craniotomy site and concern for status epilepticus) presented to the ED from her skilled nursing facility (SNF) due to lethargy and fever.  She is nonverbal, bedbound, and has a PEG tube.  She also has a recent history of *Enterococcus faecalis* urinary tract infection.    The SNF staff reported that the patient has been experiencing spiking fevers for the past 10 days.  A chest x-ray obtained at the SNF demonstrated infiltrates, prompting initiation of vancomycin for suspected pneumonia.  Her white blood cell count was elevated at 18,000/µL.  Urinalysis and urine culture were negative, as were blood cultures.  The patient has persistent anemia despite receiving weekly darbepoetin zachery (Aranesp) injections three times a week, with the last dose administered on Monday.  Her most recent hemoglobin level was 5.8 g/dL, prompting the SNF to contact the [facility] for a possible transfusion.  However, due to concerns about the patient's current infectious picture, the transfusion was deferred, and the patient was sent to the ED for further evaluation.  On presentation to the ED, the patient was lethargic.  Daughter on bedside also confirmed Diarrhea   denied nausea, vomiting, diarrhea, abdominal pain, chest pain, or sick contacts.    Physical examination revealed multiple potential sources of infection, including a right upper extremity midline, a right internal jugular port-a-cath, and a stage 3 sacral decubitus ulcer.    In Ed on vitals :  BP Systolic 164/67 mm Hg   Heart Rate	 105 /min , afebrile on 3L of NC     On labs WBC 15 .63 , Hb 5.2, eosinophil of 1.28, NA of 153 , UA positive e yeast     CXray showed B/L opacities and effusions     CTAP 1.  Large bilateral pleural effusions with adjacent compressive   atelectasis resulting in near complete left and complete right lower lobe   collapse.  2.  Mild edematous thickening of the colon with mucosal hyperemia can be   seen with colitis of infectious or inflammatory etiology. No bowel   obstruction.  3.  Trace ascites. Anasarca.    CTH : 1.  The right frontal convexity extra-axial collection has decreased in   size and density.    2.  The left frontal convexity isodense subdural collection has not   significantly changed.    In ed rcvd cefe , flagyl and vanco, 2l of LR      Pending : 2 packs of red blood cells has been ordered     admitted to SDU for further workup .      (10 Dec 2024 18:28)      VITALS  T(F): 98.3 (12-12-24 @ 04:22), Max: 98.3 (12-12-24 @ 04:22)  HR: 102 (12-12-24 @ 10:11) (102 - 104)  BP: 150/68 (12-12-24 @ 10:11) (103/60 - 151/75)  RR: 18 (12-12-24 @ 10:11) (18 - 20)  SpO2: 93% (12-12-24 @ 10:11) (93% - 100%)  POCT Blood Glucose.: 253 mg/dL (12-12-24 @ 11:37)  POCT Blood Glucose.: 215 mg/dL (12-12-24 @ 06:03)          PHYSICAL EXAM      GENERAL: Obtunded  HEAD:  Midline scalp scar   CHEST/LUNG: b/l ronchi  HEART: Regular rate and rhythm; No murmurs, rubs, or gallops  ABDOMEN: Soft, nontender, nondistended; Bowel sounds present, no organomegaly  BACK: no spinal tenderness, no CVA tenderness  EXTREMITIES:  No clubbing, cyanosis, or edema  PSYCH: Nl behavior, nl affect  NEUROLOGY: AAOx3, non-focal, moves all extremities spontaneously  SKIN: Normal color, No rashes or lesions        MEDICATIONS  STANDING MEDICATIONS  amantadine Syrup 50 milliGRAM(s) Oral two times a day  cefepime   IVPB 1000 milliGRAM(s) IV Intermittent every 12 hours  chlorhexidine 2% Cloths 1 Application(s) Topical <User Schedule>  dextrose 5%. 1000 milliLiter(s) IV Continuous <Continuous>  dextrose 5%. 1000 milliLiter(s) IV Continuous <Continuous>  dextrose 50% Injectable 25 Gram(s) IV Push once  dextrose 50% Injectable 12.5 Gram(s) IV Push once  dextrose 50% Injectable 25 Gram(s) IV Push once  enoxaparin Injectable 30 milliGRAM(s) SubCutaneous every 24 hours  glucagon  Injectable 1 milliGRAM(s) IntraMuscular once  insulin lispro (ADMELOG) corrective regimen sliding scale   SubCutaneous three times a day before meals  lacosamide IVPB 200 milliGRAM(s) IV Intermittent every 12 hours  levETIRAcetam   Injectable 1500 milliGRAM(s) IV Push every 12 hours  metroNIDAZOLE    Tablet 500 milliGRAM(s) Oral three times a day  mupirocin 2% Ointment 1 Application(s) Topical every 12 hours  pantoprazole  Injectable 40 milliGRAM(s) IV Push every 12 hours  pyridoxine 100 milliGRAM(s) Oral daily  sodium chloride 0.45%. 1000 milliLiter(s) IV Continuous <Continuous>  vancomycin  IVPB 1000 milliGRAM(s) IV Intermittent every 24 hours  vitamin A & D Ointment 1 Application(s) Topical two times a day    PRN MEDICATIONS  dextrose Oral Gel 15 Gram(s) Oral once PRN        PAST MEDICAL & SURGICAL HISTORY:  DM (diabetes mellitus)      MDS (myelodysplastic syndrome)      SDH (subdural hematoma)      Seizure disorder      Chronic kidney disease (CKD)      H/O colonoscopy      S/P percutaneous endoscopic gastrostomy (PEG) tube placement      S/P craniotomy          LABS             9.2    25.61 )-----------( 332      ( 12-12-24 @ 06:03 )             27.9     150  |  116  |  46  -------------------------<  200   12-12-24 @ 06:03  4.9  |  21  |  1.0    Ca      9.5     12-12-24 @ 06:03  Phos   6.5     12-12-24 @ 06:03  Mg     1.9     12-12-24 @ 06:03    TPro  4.7  /  Alb  2.8  /  TBili  0.6  /  DBili  x   /  AST  37  /  ALT  30  /  AlkPhos  94  /  GGT  x     12-12-24 @ 06:03    PT/INR - ( 12-12-24 @ 06:03 )   PT: 14.80 sec[H];   INR: 1.25 ratio  PTT - ( 12-12-24 @ 06:03 )  PTT:35.5 sec      Urinalysis Basic - ( 12 Dec 2024 06:03 )    Color: x / Appearance: x / SG: x / pH: x  Gluc: 200 mg/dL / Ketone: x  / Bili: x / Urobili: x   Blood: x / Protein: x / Nitrite: x   Leuk Esterase: x / RBC: x / WBC x   Sq Epi: x / Non Sq Epi: x / Bacteria: x          Culture - Body Fluid with Gram Stain (collected 11 Dec 2024 10:26)  Source: Pleural Fl  Gram Stain (11 Dec 2024 20:35):    polymorphonuclear leukocytes seen    No organisms seen    by cytocentrifuge    Urinalysis with Rflx Culture (collected 10 Dec 2024 15:00)    Culture - Blood (collected 10 Dec 2024 14:01)  Source: .Blood BLOOD  Preliminary Report (11 Dec 2024 23:08):    No growth at 24 hours    Culture - Blood (collected 10 Dec 2024 14:01)  Source: .Blood BLOOD  Preliminary Report (11 Dec 2024 23:08):    No growth at 24 hours      IMAGING SUBJECTIVE/OVERNIGHT EVENTS  Today is hospital day 2d. This morning patient was seen and examined at bedside. Patient not waking to sternal rub or verbal stimuli. vitals wnl on 1L NC. Small catheter inserted.  No acute or major events overnight.  Patient is non-verbal, unable to obtain information from her.      HPI:  A 79-year-old female with a complex medical history including myelodysplastic syndrome (MDS) followed by Dr. Wade (requiring periodic transfusions for anemia), diabetes mellitus, hypertension, chronic kidney disease, and multiple recent admissions for a right-sided subdural hematoma (SDH) with mass effect and midline shift (status-post evacuation and middle meningeal artery embolization complicated by a leaking craniotomy site and concern for status epilepticus) presented to the ED from her skilled nursing facility (SNF) due to lethargy and fever.  She is nonverbal, bedbound, and has a PEG tube.  She also has a recent history of *Enterococcus faecalis* urinary tract infection.    The SNF staff reported that the patient has been experiencing spiking fevers for the past 10 days.  A chest x-ray obtained at the SNF demonstrated infiltrates, prompting initiation of vancomycin for suspected pneumonia.  Her white blood cell count was elevated at 18,000/µL.  Urinalysis and urine culture were negative, as were blood cultures.  The patient has persistent anemia despite receiving weekly darbepoetin zachery (Aranesp) injections three times a week, with the last dose administered on Monday.  Her most recent hemoglobin level was 5.8 g/dL, prompting the SNF to contact the [facility] for a possible transfusion.  However, due to concerns about the patient's current infectious picture, the transfusion was deferred, and the patient was sent to the ED for further evaluation.  On presentation to the ED, the patient was lethargic.  Daughter on bedside also confirmed Diarrhea   denied nausea, vomiting, diarrhea, abdominal pain, chest pain, or sick contacts.    Physical examination revealed multiple potential sources of infection, including a right upper extremity midline, a right internal jugular port-a-cath, and a stage 3 sacral decubitus ulcer.    In Ed on vitals :  BP Systolic 164/67 mm Hg   Heart Rate	 105 /min , afebrile on 3L of NC     On labs WBC 15 .63 , Hb 5.2, eosinophil of 1.28, NA of 153 , UA positive e yeast     CXray showed B/L opacities and effusions     CTAP 1.  Large bilateral pleural effusions with adjacent compressive   atelectasis resulting in near complete left and complete right lower lobe   collapse.  2.  Mild edematous thickening of the colon with mucosal hyperemia can be   seen with colitis of infectious or inflammatory etiology. No bowel   obstruction.  3.  Trace ascites. Anasarca.    CTH : 1.  The right frontal convexity extra-axial collection has decreased in   size and density.    2.  The left frontal convexity isodense subdural collection has not   significantly changed.    In ed rcvd cefe , flagyl and vanco, 2l of LR      Pending : 2 packs of red blood cells has been ordered     admitted to SDU for further workup .      (10 Dec 2024 18:28)      VITALS  T(F): 98.3 (12-12-24 @ 04:22), Max: 98.3 (12-12-24 @ 04:22)  HR: 102 (12-12-24 @ 10:11) (102 - 104)  BP: 150/68 (12-12-24 @ 10:11) (103/60 - 151/75)  RR: 18 (12-12-24 @ 10:11) (18 - 20)  SpO2: 93% (12-12-24 @ 10:11) (93% - 100%)  POCT Blood Glucose.: 253 mg/dL (12-12-24 @ 11:37)  POCT Blood Glucose.: 215 mg/dL (12-12-24 @ 06:03)          PHYSICAL EXAM      GENERAL: Obtunded  HEAD:  Midline scalp scar   CHEST/LUNG: b/l ronchi, abundant URT secretions  HEART: Regular rate and rhythm; No murmurs, rubs, or gallops  ABDOMEN: Soft, nondistended; PEG tube in place with surrounding erythema and draining pus  EXTREMITIES:  Mild LE edema +1  PSYCH: obtunded  NEUROLOGY: Not awake or alert., no response to commands  SKIN: Normal color, No rashes or lesions        MEDICATIONS  STANDING MEDICATIONS  amantadine Syrup 50 milliGRAM(s) Oral two times a day  cefepime   IVPB 1000 milliGRAM(s) IV Intermittent every 12 hours  chlorhexidine 2% Cloths 1 Application(s) Topical <User Schedule>  dextrose 5%. 1000 milliLiter(s) IV Continuous <Continuous>  dextrose 5%. 1000 milliLiter(s) IV Continuous <Continuous>  dextrose 50% Injectable 25 Gram(s) IV Push once  dextrose 50% Injectable 12.5 Gram(s) IV Push once  dextrose 50% Injectable 25 Gram(s) IV Push once  enoxaparin Injectable 30 milliGRAM(s) SubCutaneous every 24 hours  glucagon  Injectable 1 milliGRAM(s) IntraMuscular once  insulin lispro (ADMELOG) corrective regimen sliding scale   SubCutaneous three times a day before meals  lacosamide IVPB 200 milliGRAM(s) IV Intermittent every 12 hours  levETIRAcetam   Injectable 1500 milliGRAM(s) IV Push every 12 hours  metroNIDAZOLE    Tablet 500 milliGRAM(s) Oral three times a day  mupirocin 2% Ointment 1 Application(s) Topical every 12 hours  pantoprazole  Injectable 40 milliGRAM(s) IV Push every 12 hours  pyridoxine 100 milliGRAM(s) Oral daily  sodium chloride 0.45%. 1000 milliLiter(s) IV Continuous <Continuous>  vancomycin  IVPB 1000 milliGRAM(s) IV Intermittent every 24 hours  vitamin A & D Ointment 1 Application(s) Topical two times a day    PRN MEDICATIONS  dextrose Oral Gel 15 Gram(s) Oral once PRN        PAST MEDICAL & SURGICAL HISTORY:  DM (diabetes mellitus)      MDS (myelodysplastic syndrome)      SDH (subdural hematoma)      Seizure disorder      Chronic kidney disease (CKD)      H/O colonoscopy      S/P percutaneous endoscopic gastrostomy (PEG) tube placement      S/P craniotomy          LABS             9.2    25.61 )-----------( 332      ( 12-12-24 @ 06:03 )             27.9     150  |  116  |  46  -------------------------<  200   12-12-24 @ 06:03  4.9  |  21  |  1.0    Ca      9.5     12-12-24 @ 06:03  Phos   6.5     12-12-24 @ 06:03  Mg     1.9     12-12-24 @ 06:03    TPro  4.7  /  Alb  2.8  /  TBili  0.6  /  DBili  x   /  AST  37  /  ALT  30  /  AlkPhos  94  /  GGT  x     12-12-24 @ 06:03    PT/INR - ( 12-12-24 @ 06:03 )   PT: 14.80 sec[H];   INR: 1.25 ratio  PTT - ( 12-12-24 @ 06:03 )  PTT:35.5 sec      Urinalysis Basic - ( 12 Dec 2024 06:03 )    Color: x / Appearance: x / SG: x / pH: x  Gluc: 200 mg/dL / Ketone: x  / Bili: x / Urobili: x   Blood: x / Protein: x / Nitrite: x   Leuk Esterase: x / RBC: x / WBC x   Sq Epi: x / Non Sq Epi: x / Bacteria: x          Culture - Body Fluid with Gram Stain (collected 11 Dec 2024 10:26)  Source: Pleural Fl  Gram Stain (11 Dec 2024 20:35):    polymorphonuclear leukocytes seen    No organisms seen    by cytocentrifuge    Urinalysis with Rflx Culture (collected 10 Dec 2024 15:00)    Culture - Blood (collected 10 Dec 2024 14:01)  Source: .Blood BLOOD  Preliminary Report (11 Dec 2024 23:08):    No growth at 24 hours    Culture - Blood (collected 10 Dec 2024 14:01)  Source: .Blood BLOOD  Preliminary Report (11 Dec 2024 23:08):    No growth at 24 hours      IMAGING

## 2024-12-12 NOTE — PROGRESS NOTE ADULT - SUBJECTIVE AND OBJECTIVE BOX
Gastroenterology progress note:     Patient is a 79y old  Female who presents with a chief complaint of      Admitted on: 12-10-24    We are following the patient for:       Interval History:    No acute events overnight.   - Diet - tolerating peg feeds  - last BM - today  - Abdominal pain - no guarding. Abdominal exam benign       PAST MEDICAL & SURGICAL HISTORY:  DM (diabetes mellitus)      MDS (myelodysplastic syndrome)      SDH (subdural hematoma)      Seizure disorder      Chronic kidney disease (CKD)      H/O colonoscopy      S/P percutaneous endoscopic gastrostomy (PEG) tube placement      S/P craniotomy          MEDICATIONS  (STANDING):  amantadine Syrup 50 milliGRAM(s) Oral two times a day  cefepime   IVPB 1000 milliGRAM(s) IV Intermittent every 12 hours  chlorhexidine 2% Cloths 1 Application(s) Topical <User Schedule>  dextrose 5%. 1000 milliLiter(s) (50 mL/Hr) IV Continuous <Continuous>  dextrose 5%. 1000 milliLiter(s) (100 mL/Hr) IV Continuous <Continuous>  dextrose 50% Injectable 25 Gram(s) IV Push once  dextrose 50% Injectable 12.5 Gram(s) IV Push once  dextrose 50% Injectable 25 Gram(s) IV Push once  enoxaparin Injectable 30 milliGRAM(s) SubCutaneous every 24 hours  famotidine   Suspension 20 milliGRAM(s) Oral daily  glucagon  Injectable 1 milliGRAM(s) IntraMuscular once  insulin lispro (ADMELOG) corrective regimen sliding scale   SubCutaneous three times a day before meals  labetalol 100 milliGRAM(s) Oral every 8 hours  lacosamide IVPB 200 milliGRAM(s) IV Intermittent every 12 hours  levETIRAcetam   Injectable 1500 milliGRAM(s) IV Push every 12 hours  metroNIDAZOLE    Tablet 500 milliGRAM(s) Oral three times a day  mupirocin 2% Ointment 1 Application(s) Topical every 12 hours  pyridoxine 100 milliGRAM(s) Oral daily  sodium chloride 0.45%. 1000 milliLiter(s) (150 mL/Hr) IV Continuous <Continuous>  vancomycin  IVPB 1000 milliGRAM(s) IV Intermittent every 24 hours  vitamin A & D Ointment 1 Application(s) Topical two times a day    MEDICATIONS  (PRN):  dextrose Oral Gel 15 Gram(s) Oral once PRN Blood Glucose LESS THAN 70 milliGRAM(s)/deciliter      Allergies  No Known Allergies      Review of Systems:   unable to obtain    Physical Examination:  T(C): 36.8 (12-12-24 @ 04:22), Max: 36.8 (12-12-24 @ 04:22)  HR: 102 (12-12-24 @ 10:11) (102 - 104)  BP: 150/68 (12-12-24 @ 10:11) (149/66 - 151/75)  RR: 18 (12-12-24 @ 10:11) (18 - 18)  SpO2: 94% (12-12-24 @ 13:20) (93% - 100%)  Weight (kg): 31.706 (12-12-24 @ 04:22)    12-12-24 @ 07:01  -  12-12-24 @ 20:52  --------------------------------------------------------  IN: 0 mL / OUT: 900 mL / NET: -900 mL        GENERAL: AAOx0, no acute distress.  HEAD:  Atraumatic, Normocephalic  EYES: conjunctiva and sclera clear  NECK: Supple, no JVD or thyromegaly  CHEST/LUNG: Clear to auscultation bilaterally; No wheeze, rhonchi, or rales  HEART: Regular rate and rhythm; normal S1, S2, No murmurs.  ABDOMEN: Soft, nontender, nondistended; Bowel sounds present  NEUROLOGY: No asterixis or tremor.   SKIN: Intact, no jaundice     Data:                        9.2    25.61 )-----------( 332      ( 12 Dec 2024 06:03 )             27.9     Hgb trend:  9.2  12-12-24 @ 06:03  10.5  12-11-24 @ 14:38  5.2  12-10-24 @ 15:27      12-10-24 @ 07:01  -  12-11-24 @ 07:00  --------------------------------------------------------  IN: 356 mL      12-12    149[H]  |  116[H]  |  48[H]  ----------------------------<  172[H]  4.5   |  21  |  1.0    Ca    9.2      12 Dec 2024 16:10  Phos  6.5     12-12  Mg     1.9     12-12    TPro  4.7[L]  /  Alb  2.8[L]  /  TBili  0.6  /  DBili  x   /  AST  37  /  ALT  30  /  AlkPhos  94  12-12    Liver panel trend:  TBili 0.6   /   AST 37   /   ALT 30   /   AlkP 94   /   Tptn 4.7   /   Alb 2.8    /   DBili --      12-12  TBili 0.6   /   AST 35   /   ALT 31   /   AlkP 108   /   Tptn 5.2   /   Alb 3.0    /   DBili --      12-11  TBili 0.3   /   AST 36   /   ALT 25   /   AlkP 97   /   Tptn 5.3   /   Alb 3.3    /   DBili --      12-10      PT/INR - ( 12 Dec 2024 06:03 )   PT: 14.80 sec;   INR: 1.25 ratio         PTT - ( 12 Dec 2024 06:03 )  PTT:35.5 sec    Culture - Fungal, Body Fluid (collected 11 Dec 2024 10:26)  Source: Pleural Fl  Preliminary Report (12 Dec 2024 13:18):    Testing in progress    Culture - Body Fluid with Gram Stain (collected 11 Dec 2024 10:26)  Source: Pleural Fl  Gram Stain (11 Dec 2024 20:35):    polymorphonuclear leukocytes seen    No organisms seen    by cytocentrifuge  Preliminary Report (12 Dec 2024 12:44):    No growth to date    Culture - Urine (collected 10 Dec 2024 15:00)  Source: Catheterized None  Final Report (12 Dec 2024 13:43):    10,000 - 49,000 CFU/mL Candida albicans    "Susceptibilities not performed"    Urinalysis with Rflx Culture (collected 10 Dec 2024 15:00)    Culture - Blood (collected 10 Dec 2024 14:01)  Source: .Blood BLOOD  Preliminary Report (11 Dec 2024 23:08):    No growth at 24 hours    Culture - Blood (collected 10 Dec 2024 14:01)  Source: .Blood BLOOD  Preliminary Report (11 Dec 2024 23:08):    No growth at 24 hours         Radiology:

## 2024-12-12 NOTE — ADVANCED PRACTICE NURSE CONSULT - ASSESSMENT
History of Present Illness:   A 79-year-old female with a complex medical history including myelodysplastic syndrome (MDS) followed by Dr. Wade (requiring periodic transfusions for anemia), diabetes mellitus, hypertension, chronic kidney disease, and multiple recent admissions for a right-sided subdural hematoma (SDH) with mass effect and midline shift (status-post evacuation and middle meningeal artery embolization complicated by a leaking craniotomy site and concern for status epilepticus) presented to the ED from her skilled nursing facility (SNF) due to lethargy and fever.  She is nonverbal, bedbound, and has a PEG tube.  She also has a recent history of *Enterococcus faecalis* urinary tract infection.    The SNF staff reported that the patient has been experiencing spiking fevers for the past 10 days.  A chest x-ray obtained at the SNF demonstrated infiltrates, prompting initiation of vancomycin for suspected pneumonia.  Her white blood cell count was elevated at 18,000/µL.  Urinalysis and urine culture were negative, as were blood cultures.  The patient has persistent anemia despite receiving weekly darbepoetin zachery (Aranesp) injections three times a week, with the last dose administered on Monday.  Her most recent hemoglobin level was 5.8 g/dL, prompting the SNF to contact the [facility] for a possible transfusion.  However, due to concerns about the patient's current infectious picture, the transfusion was deferred, and the patient was sent to the ED for further evaluation.  On presentation to the ED, the patient was lethargic.  Daughter on bedside also confirmed Diarrhea   denied nausea, vomiting, diarrhea, abdominal pain, chest pain, or sick contacts.    Physical examination revealed multiple potential sources of infection, including a right upper extremity midline, a right internal jugular port-a-cath, and a stage 3 sacral decubitus ulcer.       Allergies:  	No Known Allergies:     Home Medications:   * Patient Currently Takes Medications as of 20-Nov-2024 13:18 documented in Structured Notes  •?	Lokelma 10 g oral powder for reconstitution: 10 gram(s) orally once a day Give Lokelma 10 g daily for 5 days and then stop - check serum potassium after day 5 to evaluate  •?	hydrALAZINE 25 mg oral tablet: 1 tab(s) orally every 8 hours  •?	zinc sulfate 220 mg (as elemental zinc 50 mg) oral capsule: 1 cap(s) orally once a day Continue for 3 more weeks and then stop  •?	amantadine 50 mg/5 mL oral liquid: 50 milligram(s) orally 2 times a day  •?	lacosamide 200 mg/20 mL intravenous solution: 20 milliliter(s) intravenous every 12 hours  •?	levETIRAcetam 100 mg/mL intravenous solution: 15 milliliter(s) intravenous every 12 hours  •?	heparin: 5,000 unit(s) subcutaneous 2 times a day  •?	pyridoxine 100 mg oral tablet: 1 tab(s) orally once a day  •?	pantoprazole 40 mg oral delayed release tablet: 1 tab(s) orally once a day (before a meal)  •?	senna leaf extract oral tablet: 2 tab(s) orally once a day (at bedtime)  •?	polyethylene glycol 3350 oral powder for reconstitution: 17 gram(s) orally once a day  •?	amLODIPine 10 mg oral tablet: 1 tab(s) orally once a day  •?	labetalol 200 mg oral tablet: 1 tab(s) orally every 8 hours  •?	glimepiride 2 mg oral tablet: 1 tab(s) orally 2 times a day  •?	lisinopril 10 mg oral tablet: 1 tab(s) orally once a day  •?	SPS 15 GM/60 ML SUSPENSION: TAKE 15ML TWICE WEEKLY    Patient received in bed, limited mobility, high risk for pressure injury development or progression.    Wound  Type & Location: Sacrococcygeal stage III  Size: ~[id number]  Tissue Description: White tissue bed with some redness  Wound Exudate: scant, serous   Wound Edge: intact  Periwound Condition: macerated     Moisture associated skin damage to groin, bilateral buttock and anterior thighs.    Patient also has dry, flaky and macerated tissue under PEG site.

## 2024-12-12 NOTE — PROGRESS NOTE ADULT - SUBJECTIVE AND OBJECTIVE BOX
SIMONA KUHN  79y  Female  ***My note supersedes ALL resident notes that I sign.  My corrections for their notes are in my note.***    I can be reached directly via Teams or my office number is [phone] or [phone].    INTERVAL EVENTS: Here for f/u of PNA. Pt is not responsive. Sometimes yells out loud if moved a bit, but otherwise does NOT respond to tactile stim or verbal stim. At times, awake, but not alert. Charlie PEG feeds. Has d/c coming from around PEG, site looks infected. Pt having loose stool (pale yellow/tan). Ocampo removed today for TOV. Central lines are out.    T(F): 98.3 (12-12-24 @ 04:22), Max: 98.3 (12-12-24 @ 04:22)  HR: 102 (12-12-24 @ 10:11) (102 - 104)  BP: 150/68 (12-12-24 @ 10:11) (103/60 - 151/75)  RR: 18 (12-12-24 @ 10:11) (18 - 20)  SpO2: 94% (12-12-24 @ 13:20) (93% - 100%) - now on RA    12-12-24 @ 07:01  -  12-12-24 @ 15:23  --------------------------------------------------------  IN: 0 mL / OUT: 700 mL / NET: -700 mL    Gen: NAD  HEENT: PERRL - 3-4mm, mouth clr, nose clr; off NC O2  Neck: no nodes, no JVD, thyroid nl  lungs: clr  hrt: s1 s2 reg, tachy, no murmur  abd: soft, NT/ND, no HS megaly; + PEG site w/ d/c and cellulitis  : ocampo out for TOV  ext: no edema, no c/c  neuro: lethargic, non-verb; not interactive;     LABS:                      9.2     (    91.8   25.61 )-----------( ---------      332      ( 12 Dec 2024 06:03 )             27.9    (    15.7     WBC Count: 25.61 K/uL (12-12-24 @ 06:03)  WBC Count: 20.77 K/uL (12-11-24 @ 14:38)  WBC Count: 15.63 K/uL (12-10-24 @ 15:27)    Hemoglobin: 9.2 g/dL (12-12 @ 06:03)  Hemoglobin: 10.5 g/dL (12-11 @ 14:38)  Hemoglobin: 5.2 g/dL (12-10 @ 15:27) - s/p 2u prbc    150   (   116   (   200      12-12-24 @ 06:03  ----------------------               4.9   (   21   (   46                             -----                        1.0  Ca  9.5 = zoey 10.3  Mg  1.9    P   6.5     Sodium: 150 mmol/L (12-12-24 @ 06:03) - slightly better  Sodium: 150 mmol/L (12-12-24 @ 00:04)  Sodium: 151 mmol/L (12-11-24 @ 14:38)  Sodium: 153 mmol/L (12-10-24 @ 14:01)    150   (   116   (   189      12-12-24 @ 00:04  ----------------------               5.6   (   19   (   46                             -----                        0.9  Ca  9.9   Mg  --    P   --     LFT  4.7  (  0.6  (  37       12-12-24 @ 06:03  -------------------------  2.8  (  94  (  30    PT/INR - ( 12 Dec 2024 06:03 )   PT: 14.80 sec;   INR: 1.25 ratio    PTT - ( 12 Dec 2024 06:03 )  PTT: 35.5 sec    Urinalysis Basic - ( 12 Dec 2024 06:03 )    Color: x / Appearance: x / SG: x / pH: x  Gluc: 200 mg/dL / Ketone: x  / Bili: x / Urobili: x   Blood: x / Protein: x / Nitrite: x   Leuk Esterase: x / RBC: x / WBC x   Sq Epi: x / Non Sq Epi: x / Bacteria: x    CAPILLARY BLOOD GLUCOSE  POCT Blood Glucose.: 253 (12-12-24 @ 11:37)  POCT Blood Glucose.: 215 (12-12-24 @ 06:03)  POCT Blood Glucose.: 97 (12-10-24 @ 13:54)    Culture - Body Fluid with Gram Stain (collected 12-11-24 @ 10:26)  Source: Pleural Fl  Gram Stain (12-11-24 @ 20:35):    polymorphonuclear leukocytes seen    No organisms seen    by cytocentrifuge  Preliminary Report (12-12-24 @ 12:44):    No growth to date    Culture - Urine (collected 12-10-24 @ 15:00)  Source: Catheterized None  Final Report (12-12-24 @ 13:43):    10,000 - 49,000 CFU/mL Candida albicans    "Susceptibilities not performed"    Culture - Blood (collected 12-10-24 @ 14:01)  Source: .Blood BLOOD  Preliminary Report (12-11-24 @ 23:08):    No growth at 24 hours    Culture - Blood (collected 12-10-24 @ 14:01)  Source: .Blood BLOOD  Preliminary Report (12-11-24 @ 23:08):    No growth at 24 hours    RADIOLOGY & ADDITIONAL TESTS:  < from: VA Duplex Lower Ext Vein Scan, Bilat (12.11.24 @ 20:17) >  No evidence of deep venous thrombosis in either lower extremity.    < end of copied text >    < from: Xray Chest 1 View-PORTABLE IMMEDIATE (Xray Chest 1 View-PORTABLE IMMEDIATE .) (12.11.24 @ 10:20) >  Support devices: Stable positioning    Cardiac/mediastinum/hilum: No significant change    Skeleton/soft tissues: No significant change.    Lung parenchyma/Pleura: Decreased right-sided opacity/effusion. Question increased opacities on the left.    < end of copied text >    < from: CT Head No Cont (12.10.24 @ 14:31) >  Since the brain MRI 11/5/2024 and CT head 11/1/2024:    1.  The right frontal convexity extra-axial collection has decreased in size and density.    2.  The left frontal convexity isodense subdural collection has not significantly changed.    < end of copied text >    < from: CT Chest w/ IV Cont (12.10.24 @ 14:31) >  1.  Large bilateral pleural effusions with adjacent compressive   atelectasis resulting in near complete left and complete right lower lobe collapse.  2.  Mild edematous thickening of the colon with mucosal hyperemia can be   seen with colitis of infectious or inflammatory etiology. No bowel obstruction.  3.  Trace ascites. Anasarca.    < end of copied text >    < from: TTE Echo Complete w/o Contrast w/ Doppler (10.09.24 @ 12:41) >   1. Technically limited study.   2. Normal global left ventricular systolic function.   3. Left ventricular ejection fraction, by visual estimation, is 65 to 70%.   4. The left ventricular diastolic function could not be assessed in this study.   5. Normal right ventricular size and function.   6. Mildly enlarged left atrium.   7. Sclerotic aortic valve with normal opening.   8. Mild tricuspid regurgitation.   9. Normal pulmonary artery pressure.    < end of copied text >    MEDICATIONS:  cefepime   IVPB 1000 milliGRAM(s) IV Intermittent every 12 hours  metroNIDAZOLE    Tablet 500 milliGRAM(s) Oral three times a day  vancomycin  IVPB 1000 milliGRAM(s) IV Intermittent every 24 hours    amantadine Syrup 50 milliGRAM(s) Oral two times a day  chlorhexidine 2% Cloths 1 Application(s) Topical <User Schedule>  enoxaparin Injectable 30 milliGRAM(s) SubCutaneous every 24 hours  famotidine   Suspension 20 milliGRAM(s) Oral two times a day  insulin lispro (ADMELOG) corrective regimen sliding scale   SubCutaneous three times a day before meals  lacosamide IVPB 200 milliGRAM(s) IV Intermittent every 12 hours  levETIRAcetam   Injectable 1500 milliGRAM(s) IV Push every 12 hours  mupirocin 2% Ointment 1 Application(s) Topical every 12 hours  pyridoxine 100 milliGRAM(s) Oral daily  sodium chloride 0.45%. 1000 milliLiter(s) IV Continuous <Continuous>  vitamin A & D Ointment 1 Application(s) Topical two times a day

## 2024-12-12 NOTE — PROGRESS NOTE ADULT - ATTENDING COMMENTS
IMPRESSION:    UTI  HO E faecalis  UTI   Hypernatremia  Symptomatic anemia  HO MDS  Bilateral pleural effusions R>L.  SP right thoracentesis.    HO SDH SP craniotomy and MMA embolization   SP PEG   HO DM and HTN     Plan as outlined above

## 2024-12-12 NOTE — PROGRESS NOTE ADULT - CONVERSATION DETAILS
Although pt's current cond is poor, pt was fully independent up until early Oct 2024. Thus, family want full code and medical interventions for now.

## 2024-12-12 NOTE — PROGRESS NOTE ADULT - ATTENDING COMMENTS
No evidence of abscess/collection, PEG appears to be functioning and pt tolerating feeds. Recommend continue antibiotics per ID, monitor gastrostomy site, serial abd exams.

## 2024-12-12 NOTE — PROGRESS NOTE ADULT - ASSESSMENT
A 79-year-old woman with medical history including myelodysplastic syndrome (MDS) followed by Dr. Wade (requiring periodic transfusions for anemia), diabetes mellitus, hypertension, chronic kidney disease 2, and multiple recent admissions for a b/l subdural hematomas (SDH) with mass effect and midline shift (status-post evacuation and middle meningeal artery embolization complicated by a leaking craniotomy site and concern for status epilepticus) presented to the ED from her skilled nursing facility (SNF) due to lethargy and fever.  She is nonverbal, bedbound, and has a PEG tube.  She also has a recent history of *Enterococcus faecalis* urinary tract infection.    The SNF staff reported that the patient has been experiencing spiking fevers for 10 days PTA.  A chest x-ray obtained at the SNF demonstrated infiltrates, prompting initiation of vancomycin for suspected pneumonia.  Her white blood cell count was elevated at 18,000/µL.  Urinalysis and urine culture were negative, as were blood cultures.  The patient has persistent anemia despite receiving weekly darbepoetin zachery (Aranesp) injections three times a week, with the last dose administered on Monday 12/9.  Her most recent hemoglobin level was 5.8 g/dL, prompting the SNF to contact the [facility] for a possible transfusion.  However, due to concerns about the patient's current infectious picture, the transfusion was deferred, and the patient was sent to the ED for further evaluation. On presentation to the ED, the patient was lethargic. No nausea, vomiting, diarrhea, abdominal pain, chest pain, or sick contacts.     # This is a STAR patient - GOC done    # pt is immunocompromised 2/2 age, MDS, DM    # pt is non-verbal and has functional quadriplegia. Prior to arrival to ED on 10/8/24 for being found unresponsive on floor, pt was independent of all ADLs, lived alone, took care of herself well and had no dementia.    # Sepsis POA 2/2 PNA and PEG cellulitis; has diarrhea  remains tachy; afebrile  now off O2   WBC 15.63 -> 20.8 -> 25.6 ->   UA positive for yeast-like cells, few bacteria, moderate leuk est and WBC  UCx: 10-49K candida albicans - ID said no tx  Pl Fl Cx: neg  BCx: neg  PEG site Cx: sent  MRSA nares: neg  C Diff: neg  U Strp/Leg: f/u  GI PCR: f/u  V Dupl: neg DVT (dimer 1822)  CXR: decr rt side opac; poss incr lt side opac  CT C: Lg b/l pl eff w/ adj comp atelec w/ near complete collapse LLL and complete collapse RLL  CT A/P: mild edema of colon w/ mucosal hyperremia; no bowel obst; tr ascites; anasarca  ID noted  abx: c/w cefepime, vanc and flagyl   bactroban oint top q12 to PEG site  Please cleanse under PEG site with Vashe twice daily PRN for soiling and pat dry.  sp thoracentesis with removal of 1L serous fluid (transudative)  no laxatives  cbc q24    # stg 3 sacrococcygeal decub ulcer  wound care RN noted  can use rectal tube to divert stool away from ulcer - Patient will benefit from fecal incontinence  bag due to multiple episodes of liquid diarrhea    Cleanse wound with soap and water, pat dry. Apply Triad cream twice daily PRN for soiling to sacrococcygeal area, groin, buttock and thighs.  Skin and incontinence care.  Pressure Injury Prevention.    # MDS with normo Anemia  aranesp not helpful in past  sees Dr Wade (h/o)  tx dependent  sp 2u pRBCs   keep hgb > 7.5  try to limit excessive labs    # Hypernatremia likely 2/2 to dec PO intake/dehydration:   Na 153 -> 150 ->  IVFs: 1/2 /hr  adj PEG feeds/fluids  Feeds: glucerna 360cc PEG q8  Flush: 50/100cc pre/post q8  Free Water: 250cc 2hrs after each feed q8  BMP 4pm and AM    # hyperkalemia - better    # hyperphosphatemia - mild  f/u in couple days    # Ca at upper limits nl  observe    # DM  glucerna feeds  FS qac/hs - goal 100-180 - hyperglycemia  SSI w/ feeds q8 - adjust as needed    # Recent admission for b/l SDH w/ mass effect + midline shift s/p evacuation  s/p MMA embolization, c/b leaking from crani site and concern for status epilepticus   CTH :   1.  The right frontal convexity extra-axial collection has decreased in size and density.  2.  The left frontal convexity isodense subdural collection has not significantly changed.  c/w Keppra 1500 mg iv q12  c/w Lacosamide 200 iv q12   c/w amantadine 50mg PEG q12  c/w Vit B6 100mg PEG q24    # HTN   Echo 10/9/24: EF 65 to 70%; mild LAE; mild TR; no pHTN  HOLD lisinopril 20mg q24  resume labetalol @ 100mg peg q8 - hold BP < 100/60, HR < 55 (usual dose 200mg q 8)  HOLD norvasc 10mg q24    # CKD 2; minor metab acidosis  (Cr 1.0 at baseline)  stable     # DVT ppx: LMWH 30mg sc q24    # GI ppx: pepcid 20 peg q12    # Activity: bedbound    # full code     Dispo: tx DM; resume BB: tx Na; f/u lytes; daily labs; f/u ID re abx; IVFs; PEG feeds; tx diarrhea; PEG site care; sacral wound care; f/u U Leg/strp; f/u GI PCR  eventually, pt will go back for STR at SNF ([facility]) - f/u CM - still acute    Prog is very guarded.

## 2024-12-13 LAB
A1C WITH ESTIMATED AVERAGE GLUCOSE RESULT: 7.6 % — HIGH (ref 4–5.6)
ALBUMIN SERPL ELPH-MCNC: 2.8 G/DL — LOW (ref 3.5–5.2)
ALP SERPL-CCNC: 105 U/L — SIGNIFICANT CHANGE UP (ref 30–115)
ALT FLD-CCNC: 28 U/L — SIGNIFICANT CHANGE UP (ref 0–41)
ANION GAP SERPL CALC-SCNC: 14 MMOL/L — SIGNIFICANT CHANGE UP (ref 7–14)
ANION GAP SERPL CALC-SCNC: 14 MMOL/L — SIGNIFICANT CHANGE UP (ref 7–14)
AST SERPL-CCNC: 39 U/L — SIGNIFICANT CHANGE UP (ref 0–41)
BASOPHILS # BLD AUTO: 0.17 K/UL — SIGNIFICANT CHANGE UP (ref 0–0.2)
BASOPHILS NFR BLD AUTO: 0.9 % — SIGNIFICANT CHANGE UP (ref 0–1)
BILIRUB SERPL-MCNC: 0.4 MG/DL — SIGNIFICANT CHANGE UP (ref 0.2–1.2)
BUN SERPL-MCNC: 42 MG/DL — HIGH (ref 10–20)
BUN SERPL-MCNC: 44 MG/DL — HIGH (ref 10–20)
CALCIUM SERPL-MCNC: 8.8 MG/DL — SIGNIFICANT CHANGE UP (ref 8.4–10.4)
CALCIUM SERPL-MCNC: 9.3 MG/DL — SIGNIFICANT CHANGE UP (ref 8.4–10.5)
CHLORIDE SERPL-SCNC: 113 MMOL/L — HIGH (ref 98–110)
CHLORIDE SERPL-SCNC: 114 MMOL/L — HIGH (ref 98–110)
CO2 SERPL-SCNC: 15 MMOL/L — LOW (ref 17–32)
CO2 SERPL-SCNC: 19 MMOL/L — SIGNIFICANT CHANGE UP (ref 17–32)
CREAT SERPL-MCNC: 1 MG/DL — SIGNIFICANT CHANGE UP (ref 0.7–1.5)
CREAT SERPL-MCNC: 1 MG/DL — SIGNIFICANT CHANGE UP (ref 0.7–1.5)
EGFR: 58 ML/MIN/1.73M2 — LOW
EGFR: 58 ML/MIN/1.73M2 — LOW
EOSINOPHIL # BLD AUTO: 0.54 K/UL — SIGNIFICANT CHANGE UP (ref 0–0.7)
EOSINOPHIL NFR BLD AUTO: 3 % — SIGNIFICANT CHANGE UP (ref 0–8)
ESTIMATED AVERAGE GLUCOSE: 171 MG/DL — HIGH (ref 68–114)
GLUCOSE BLDC GLUCOMTR-MCNC: 165 MG/DL — HIGH (ref 70–99)
GLUCOSE BLDC GLUCOMTR-MCNC: 166 MG/DL — HIGH (ref 70–99)
GLUCOSE BLDC GLUCOMTR-MCNC: 199 MG/DL — HIGH (ref 70–99)
GLUCOSE BLDC GLUCOMTR-MCNC: 221 MG/DL — HIGH (ref 70–99)
GLUCOSE BLDC GLUCOMTR-MCNC: 233 MG/DL — HIGH (ref 70–99)
GLUCOSE SERPL-MCNC: 156 MG/DL — HIGH (ref 70–99)
GLUCOSE SERPL-MCNC: 206 MG/DL — HIGH (ref 70–99)
HCT VFR BLD CALC: 27.5 % — LOW (ref 37–47)
HGB BLD-MCNC: 9.1 G/DL — LOW (ref 12–16)
IMM GRANULOCYTES NFR BLD AUTO: 1.2 % — HIGH (ref 0.1–0.3)
LYMPHOCYTES # BLD AUTO: 12.1 % — LOW (ref 20.5–51.1)
LYMPHOCYTES # BLD AUTO: 2.2 K/UL — SIGNIFICANT CHANGE UP (ref 1.2–3.4)
MAGNESIUM SERPL-MCNC: 1.9 MG/DL — SIGNIFICANT CHANGE UP (ref 1.8–2.4)
MCHC RBC-ENTMCNC: 30.2 PG — SIGNIFICANT CHANGE UP (ref 27–31)
MCHC RBC-ENTMCNC: 33.1 G/DL — SIGNIFICANT CHANGE UP (ref 32–37)
MCV RBC AUTO: 91.4 FL — SIGNIFICANT CHANGE UP (ref 81–99)
MONOCYTES # BLD AUTO: 1.85 K/UL — HIGH (ref 0.1–0.6)
MONOCYTES NFR BLD AUTO: 10.2 % — HIGH (ref 1.7–9.3)
NEUTROPHILS # BLD AUTO: 13.22 K/UL — HIGH (ref 1.4–6.5)
NEUTROPHILS NFR BLD AUTO: 72.6 % — SIGNIFICANT CHANGE UP (ref 42.2–75.2)
NRBC # BLD: 0 /100 WBCS — SIGNIFICANT CHANGE UP (ref 0–0)
PLATELET # BLD AUTO: 352 K/UL — SIGNIFICANT CHANGE UP (ref 130–400)
PMV BLD: 11.8 FL — HIGH (ref 7.4–10.4)
POTASSIUM SERPL-MCNC: 4.5 MMOL/L — SIGNIFICANT CHANGE UP (ref 3.5–5)
POTASSIUM SERPL-MCNC: 4.7 MMOL/L — SIGNIFICANT CHANGE UP (ref 3.5–5)
POTASSIUM SERPL-SCNC: 4.5 MMOL/L — SIGNIFICANT CHANGE UP (ref 3.5–5)
POTASSIUM SERPL-SCNC: 4.7 MMOL/L — SIGNIFICANT CHANGE UP (ref 3.5–5)
PROT SERPL-MCNC: 4.8 G/DL — LOW (ref 6–8)
RBC # BLD: 3.01 M/UL — LOW (ref 4.2–5.4)
RBC # FLD: 15.8 % — HIGH (ref 11.5–14.5)
SODIUM SERPL-SCNC: 142 MMOL/L — SIGNIFICANT CHANGE UP (ref 135–146)
SODIUM SERPL-SCNC: 147 MMOL/L — HIGH (ref 135–146)
WBC # BLD: 18.19 K/UL — HIGH (ref 4.8–10.8)
WBC # FLD AUTO: 18.19 K/UL — HIGH (ref 4.8–10.8)

## 2024-12-13 PROCEDURE — 99232 SBSQ HOSP IP/OBS MODERATE 35: CPT

## 2024-12-13 PROCEDURE — 99233 SBSQ HOSP IP/OBS HIGH 50: CPT

## 2024-12-13 RX ORDER — AMLODIPINE BESYLATE 10 MG/1
5 TABLET ORAL AT BEDTIME
Refills: 0 | Status: DISCONTINUED | OUTPATIENT
Start: 2024-12-13 | End: 2024-12-16

## 2024-12-13 RX ORDER — INSULIN GLARGINE 100 [IU]/ML
5 INJECTION, SOLUTION SUBCUTANEOUS AT BEDTIME
Refills: 0 | Status: DISCONTINUED | OUTPATIENT
Start: 2024-12-13 | End: 2024-12-16

## 2024-12-13 RX ORDER — NYSTATIN 100000 [USP'U]/G
1 POWDER TOPICAL EVERY 12 HOURS
Refills: 0 | Status: DISCONTINUED | OUTPATIENT
Start: 2024-12-13 | End: 2024-12-16

## 2024-12-13 RX ORDER — LABETALOL 100 MG/1
200 TABLET, FILM COATED ORAL EVERY 8 HOURS
Refills: 0 | Status: DISCONTINUED | OUTPATIENT
Start: 2024-12-13 | End: 2024-12-16

## 2024-12-13 RX ADMIN — LACOSAMIDE 140 MILLIGRAM(S): 10 INJECTION INTRAVENOUS at 05:59

## 2024-12-13 RX ADMIN — CHLORHEXIDINE GLUCONATE 1 APPLICATION(S): 1.2 RINSE ORAL at 06:21

## 2024-12-13 RX ADMIN — LABETALOL 100 MILLIGRAM(S): 100 TABLET, FILM COATED ORAL at 05:55

## 2024-12-13 RX ADMIN — METRONIDAZOLE 500 MILLIGRAM(S): 500 TABLET ORAL at 06:22

## 2024-12-13 RX ADMIN — Medication 100 MILLIGRAM(S): at 12:48

## 2024-12-13 RX ADMIN — METRONIDAZOLE 500 MILLIGRAM(S): 500 TABLET ORAL at 21:55

## 2024-12-13 RX ADMIN — Medication 2: at 18:06

## 2024-12-13 RX ADMIN — LEVETIRACETAM 1500 MILLIGRAM(S): 1000 TABLET ORAL at 05:54

## 2024-12-13 RX ADMIN — CEFEPIME 100 MILLIGRAM(S): 2 INJECTION, POWDER, FOR SOLUTION INTRAVENOUS at 18:05

## 2024-12-13 RX ADMIN — LABETALOL 200 MILLIGRAM(S): 100 TABLET, FILM COATED ORAL at 21:55

## 2024-12-13 RX ADMIN — FAMOTIDINE 20 MILLIGRAM(S): 20 TABLET, FILM COATED ORAL at 12:48

## 2024-12-13 RX ADMIN — Medication 250 MILLIGRAM(S): at 06:00

## 2024-12-13 RX ADMIN — METRONIDAZOLE 500 MILLIGRAM(S): 500 TABLET ORAL at 14:31

## 2024-12-13 RX ADMIN — Medication 1 APPLICATION(S): at 18:29

## 2024-12-13 RX ADMIN — Medication 4: at 12:49

## 2024-12-13 RX ADMIN — CEFEPIME 100 MILLIGRAM(S): 2 INJECTION, POWDER, FOR SOLUTION INTRAVENOUS at 06:00

## 2024-12-13 RX ADMIN — Medication 175 MILLILITER(S): at 12:05

## 2024-12-13 RX ADMIN — Medication 50 MILLIGRAM(S): at 18:04

## 2024-12-13 RX ADMIN — Medication 50 MILLIGRAM(S): at 05:54

## 2024-12-13 RX ADMIN — LEVETIRACETAM 1500 MILLIGRAM(S): 1000 TABLET ORAL at 18:05

## 2024-12-13 RX ADMIN — NYSTATIN 1 APPLICATION(S): 100000 POWDER TOPICAL at 18:04

## 2024-12-13 RX ADMIN — INSULIN GLARGINE 5 UNIT(S): 100 INJECTION, SOLUTION SUBCUTANEOUS at 21:52

## 2024-12-13 RX ADMIN — Medication 1 APPLICATION(S): at 05:54

## 2024-12-13 RX ADMIN — Medication 175 MILLILITER(S): at 18:04

## 2024-12-13 RX ADMIN — LACOSAMIDE 140 MILLIGRAM(S): 10 INJECTION INTRAVENOUS at 18:53

## 2024-12-13 RX ADMIN — ENOXAPARIN SODIUM 30 MILLIGRAM(S): 30 INJECTION SUBCUTANEOUS at 09:24

## 2024-12-13 RX ADMIN — AMLODIPINE BESYLATE 5 MILLIGRAM(S): 10 TABLET ORAL at 21:56

## 2024-12-13 RX ADMIN — Medication 1 APPLICATION(S): at 18:05

## 2024-12-13 RX ADMIN — Medication 1 APPLICATION(S): at 06:13

## 2024-12-13 RX ADMIN — LABETALOL 100 MILLIGRAM(S): 100 TABLET, FILM COATED ORAL at 14:31

## 2024-12-13 NOTE — PROGRESS NOTE ADULT - GASTROINTESTINAL
soft/nontender/no guarding/no rigidity
soft/nontender/no guarding/no rigidity/distended
Immediate family member

## 2024-12-13 NOTE — PROGRESS NOTE ADULT - SUBJECTIVE AND OBJECTIVE BOX
Patient is a 79y old  Female who presents with a chief complaint of       SUBJECTIVE:  The patient was seen at bedside.  She was comfortable and in her baseline    REVIEW OF SYSTEMS:    All Pertinent ROS are negative except per HPI       PHYSICAL EXAM  Vital Signs Last 24 Hrs  T(C): 36.6 (13 Dec 2024 13:15), Max: 36.7 (12 Dec 2024 20:59)  T(F): 97.8 (13 Dec 2024 13:15), Max: 98 (12 Dec 2024 20:59)  HR: 96 (13 Dec 2024 13:15) (96 - 101)  BP: 163/75 (13 Dec 2024 13:15) (152/69 - 163/75)  BP(mean): --  RR: 18 (13 Dec 2024 13:15) (18 - 20)  SpO2: 96% (12 Dec 2024 20:59) (96% - 96%)        CONSTITUTIONAL:  Well nourished.  NAD    ENT:   Airway patent,   No thrush    CARDIAC:   Normal rate,   regular rhythm.    no edema      RESPIRATORY:   NO Wheezing  Nnormal chest expansion  Nnot tachypneic,  No use of accessory muscles    GASTROINTESTINAL:  Abdomen soft,   non-tender,   no guarding,   + BS    MUSCULOSKELETAL:   range of motion is not limited,  no clubbing, cyanosis        12-12-24 @ 07:01  -  12-13-24 @ 07:00  --------------------------------------------------------  IN:  Total IN: 0 mL    OUT:    Indwelling Catheter - Urethral (mL): 700 mL    Voided (mL): 200 mL  Total OUT: 900 mL    Total NET: -900 mL          LABS:                          9.1    18.19 )-----------( 352      ( 13 Dec 2024 06:57 )             27.5                                               12-13    147[H]  |  114[H]  |  44[H]  ----------------------------<  206[H]  4.7   |  19  |  1.0    Ca    9.3      13 Dec 2024 06:57  Phos  6.5     12-12  Mg     1.9     12-13    TPro  4.8[L]  /  Alb  2.8[L]  /  TBili  0.4  /  DBili  x   /  AST  39  /  ALT  28  /  AlkPhos  105  12-13      PT/INR - ( 12 Dec 2024 06:03 )   PT: 14.80 sec;   INR: 1.25 ratio         PTT - ( 12 Dec 2024 06:03 )  PTT:35.5 sec                                       Urinalysis Basic - ( 13 Dec 2024 06:57 )    Color: x / Appearance: x / SG: x / pH: x  Gluc: 206 mg/dL / Ketone: x  / Bili: x / Urobili: x   Blood: x / Protein: x / Nitrite: x   Leuk Esterase: x / RBC: x / WBC x   Sq Epi: x / Non Sq Epi: x / Bacteria: x                                                  LIVER FUNCTIONS - ( 13 Dec 2024 06:57 )  Alb: 2.8 g/dL / Pro: 4.8 g/dL / ALK PHOS: 105 U/L / ALT: 28 U/L / AST: 39 U/L / GGT: x                                                  Culture - Fungal, Body Fluid (collected 11 Dec 2024 10:26)  Source: Pleural Fl  Preliminary Report (13 Dec 2024 16:53):    No growth    Culture - Body Fluid with Gram Stain (collected 11 Dec 2024 10:26)  Source: Pleural Fl  Gram Stain (11 Dec 2024 20:35):    polymorphonuclear leukocytes seen    No organisms seen    by cytocentrifuge  Preliminary Report (12 Dec 2024 12:44):    No growth to date                                                    MEDICATIONS  (STANDING):  amantadine Syrup 50 milliGRAM(s) Oral two times a day  amLODIPine   Tablet 5 milliGRAM(s) Oral at bedtime  cefepime   IVPB 1000 milliGRAM(s) IV Intermittent every 12 hours  chlorhexidine 2% Cloths 1 Application(s) Topical <User Schedule>  dextrose 5%. 1000 milliLiter(s) (50 mL/Hr) IV Continuous <Continuous>  dextrose 5%. 1000 milliLiter(s) (100 mL/Hr) IV Continuous <Continuous>  dextrose 50% Injectable 25 Gram(s) IV Push once  dextrose 50% Injectable 12.5 Gram(s) IV Push once  dextrose 50% Injectable 25 Gram(s) IV Push once  enoxaparin Injectable 30 milliGRAM(s) SubCutaneous every 24 hours  famotidine   Suspension 20 milliGRAM(s) Oral daily  glucagon  Injectable 1 milliGRAM(s) IntraMuscular once  insulin glargine Injectable (LANTUS) 5 Unit(s) SubCutaneous at bedtime  insulin lispro (ADMELOG) corrective regimen sliding scale   SubCutaneous three times a day before meals  labetalol 200 milliGRAM(s) Oral every 8 hours  lacosamide IVPB 200 milliGRAM(s) IV Intermittent every 12 hours  levETIRAcetam   Injectable 1500 milliGRAM(s) IV Push every 12 hours  metroNIDAZOLE    Tablet 500 milliGRAM(s) Oral three times a day  mupirocin 2% Ointment 1 Application(s) Topical every 12 hours  nystatin Ointment 1 Application(s) Topical every 12 hours  pyridoxine 100 milliGRAM(s) Oral daily  sodium chloride 0.45%. 1000 milliLiter(s) (175 mL/Hr) IV Continuous <Continuous>  vancomycin  IVPB 1000 milliGRAM(s) IV Intermittent every 24 hours  vitamin A & D Ointment 1 Application(s) Topical two times a day    MEDICATIONS  (PRN):  dextrose Oral Gel 15 Gram(s) Oral once PRN Blood Glucose LESS THAN 70 milliGRAM(s)/deciliter      X-Rays reviewed    CXR interpreted by me:

## 2024-12-13 NOTE — PROGRESS NOTE ADULT - ASSESSMENT
IMPRESSION:    UTI  HO E faecalis  UTI   Hypernatremia  Symptomatic anemia  HO MDS  Bilateral pleural effusions R>L.  SP right thoracentesis.    HO SDH SP craniotomy and MMA embolization   SP PEG   HO DM and HTN     PLAN:    IMPRESSION:      Symptomatic anemia- improved    Bilateral pleural effusions R>L.  SP right thoracentesis.  The left has anechoic fluid good pocket, however patient is asymptomatic       PLAN:    -will hold off on Thora  -recall if symptomatic

## 2024-12-13 NOTE — PROGRESS NOTE ADULT - MUSCULOSKELETAL
no calf tenderness/no strength/no chest wall tenderness
no calf tenderness/no strength/no chest wall tenderness

## 2024-12-13 NOTE — ED ADULT NURSE NOTE - NS TRANSFER PATIENT BELONGINGS
Phoned patient in reference to [employer] message. Pt was referred to [facility] for weight loss management. Appointment scheduled and patient notified. Patient has no further questions or complaints.  
Clothing

## 2024-12-13 NOTE — PROGRESS NOTE ADULT - SUBJECTIVE AND OBJECTIVE BOX
SIMONA KUHN  79y, Female    All available historical data reviewed    OVERNIGHT EVENTS:  no fevers      ROS:  unable to obtain history secondary to patient's mental status and/or sedation     VITALS:  T(F): 97.9, Max: 98 (12-12-24 @ 20:59)  HR: 101  BP: 152/69  RR: 19Vital Signs Last 24 Hrs  T(C): 36.6 (13 Dec 2024 04:47), Max: 36.7 (12 Dec 2024 20:59)  T(F): 97.9 (13 Dec 2024 04:47), Max: 98 (12 Dec 2024 20:59)  HR: 101 (13 Dec 2024 04:47) (100 - 101)  BP: 152/69 (13 Dec 2024 04:47) (152/69 - 154/105)  BP(mean): --  RR: 19 (13 Dec 2024 04:47) (19 - 20)  SpO2: 96% (12 Dec 2024 20:59) (94% - 96%)    Parameters below as of 12 Dec 2024 13:20  Patient On (Oxygen Delivery Method): room air        TESTS & MEASUREMENTS:                        9.1    18.19 )-----------( 352      ( 13 Dec 2024 06:57 )             27.5     12-13    147[H]  |  114[H]  |  44[H]  ----------------------------<  206[H]  4.7   |  19  |  1.0    Ca    9.3      13 Dec 2024 06:57  Phos  6.5     12-12  Mg     1.9     12-13    TPro  4.8[L]  /  Alb  2.8[L]  /  TBili  0.4  /  DBili  x   /  AST  39  /  ALT  28  /  AlkPhos  105  12-13    LIVER FUNCTIONS - ( 13 Dec 2024 06:57 )  Alb: 2.8 g/dL / Pro: 4.8 g/dL / ALK PHOS: 105 U/L / ALT: 28 U/L / AST: 39 U/L / GGT: x             Culture - Fungal, Body Fluid (collected 12-11-24 @ 10:26)  Source: Pleural Fl  Preliminary Report (12-12-24 @ 13:18):    Testing in progress    Culture - Body Fluid with Gram Stain (collected 12-11-24 @ 10:26)  Source: Pleural Fl  Gram Stain (12-11-24 @ 20:35):    polymorphonuclear leukocytes seen    No organisms seen    by cytocentrifuge  Preliminary Report (12-12-24 @ 12:44):    No growth to date    Culture - Urine (collected 12-10-24 @ 15:00)  Source: Catheterized None  Final Report (12-12-24 @ 13:43):    10,000 - 49,000 CFU/mL Candida albicans    "Susceptibilities not performed"    Urinalysis with Rflx Culture (collected 12-10-24 @ 15:00)    Culture - Blood (collected 12-10-24 @ 14:01)  Source: .Blood BLOOD  Preliminary Report (12-12-24 @ 23:01):    No growth at 48 Hours    Culture - Blood (collected 12-10-24 @ 14:01)  Source: .Blood BLOOD  Preliminary Report (12-12-24 @ 23:01):    No growth at 48 Hours      Urinalysis Basic - ( 13 Dec 2024 06:57 )    Color: x / Appearance: x / SG: x / pH: x  Gluc: 206 mg/dL / Ketone: x  / Bili: x / Urobili: x   Blood: x / Protein: x / Nitrite: x   Leuk Esterase: x / RBC: x / WBC x   Sq Epi: x / Non Sq Epi: x / Bacteria: x          Social History:  Tobacco Use: No  Alcohol Use: No  Drug Use: No    RADIOLOGY & ADDITIONAL TESTS:  Personal review of radiological diagnostics performed  Echo and EKG results noted when applicable.     MEDICATIONS:  amantadine Syrup 50 milliGRAM(s) Oral two times a day  cefepime   IVPB 1000 milliGRAM(s) IV Intermittent every 12 hours  chlorhexidine 2% Cloths 1 Application(s) Topical <User Schedule>  dextrose 5%. 1000 milliLiter(s) IV Continuous <Continuous>  dextrose 5%. 1000 milliLiter(s) IV Continuous <Continuous>  dextrose 50% Injectable 25 Gram(s) IV Push once  dextrose 50% Injectable 12.5 Gram(s) IV Push once  dextrose 50% Injectable 25 Gram(s) IV Push once  dextrose Oral Gel 15 Gram(s) Oral once PRN  enoxaparin Injectable 30 milliGRAM(s) SubCutaneous every 24 hours  famotidine   Suspension 20 milliGRAM(s) Oral daily  glucagon  Injectable 1 milliGRAM(s) IntraMuscular once  insulin glargine Injectable (LANTUS) 5 Unit(s) SubCutaneous at bedtime  insulin lispro (ADMELOG) corrective regimen sliding scale   SubCutaneous three times a day before meals  labetalol 100 milliGRAM(s) Oral every 8 hours  lacosamide IVPB 200 milliGRAM(s) IV Intermittent every 12 hours  levETIRAcetam   Injectable 1500 milliGRAM(s) IV Push every 12 hours  metroNIDAZOLE    Tablet 500 milliGRAM(s) Oral three times a day  mupirocin 2% Ointment 1 Application(s) Topical every 12 hours  pyridoxine 100 milliGRAM(s) Oral daily  sodium chloride 0.45%. 1000 milliLiter(s) IV Continuous <Continuous>  vancomycin  IVPB 1000 milliGRAM(s) IV Intermittent every 24 hours  vitamin A & D Ointment 1 Application(s) Topical two times a day      ANTIBIOTICS:  cefepime   IVPB 1000 milliGRAM(s) IV Intermittent every 12 hours  metroNIDAZOLE    Tablet 500 milliGRAM(s) Oral three times a day  vancomycin  IVPB 1000 milliGRAM(s) IV Intermittent every 24 hours

## 2024-12-13 NOTE — PROGRESS NOTE ADULT - SUBJECTIVE AND OBJECTIVE BOX
Gastroenterology progress note:     Patient is a 79y old  Female who presents with a chief complaint of      Admitted on: 12-10-24    We are following the patient for:peg site infection        Interval History:    No acute events overnight.   - Diet - tolerating peg feeds  - last BM - today   - Abdominal pain - no tenderness on exam      PAST MEDICAL & SURGICAL HISTORY:  DM (diabetes mellitus)      MDS (myelodysplastic syndrome)      SDH (subdural hematoma)      Seizure disorder      Chronic kidney disease (CKD)      H/O colonoscopy      S/P percutaneous endoscopic gastrostomy (PEG) tube placement      S/P craniotomy          MEDICATIONS  (STANDING):  amantadine Syrup 50 milliGRAM(s) Oral two times a day  amLODIPine   Tablet 5 milliGRAM(s) Oral at bedtime  cefepime   IVPB 1000 milliGRAM(s) IV Intermittent every 12 hours  chlorhexidine 2% Cloths 1 Application(s) Topical <User Schedule>  dextrose 5%. 1000 milliLiter(s) (100 mL/Hr) IV Continuous <Continuous>  dextrose 5%. 1000 milliLiter(s) (50 mL/Hr) IV Continuous <Continuous>  dextrose 50% Injectable 25 Gram(s) IV Push once  dextrose 50% Injectable 12.5 Gram(s) IV Push once  dextrose 50% Injectable 25 Gram(s) IV Push once  enoxaparin Injectable 30 milliGRAM(s) SubCutaneous every 24 hours  famotidine   Suspension 20 milliGRAM(s) Oral daily  glucagon  Injectable 1 milliGRAM(s) IntraMuscular once  insulin glargine Injectable (LANTUS) 5 Unit(s) SubCutaneous at bedtime  insulin lispro (ADMELOG) corrective regimen sliding scale   SubCutaneous three times a day before meals  labetalol 200 milliGRAM(s) Oral every 8 hours  lacosamide IVPB 200 milliGRAM(s) IV Intermittent every 12 hours  levETIRAcetam   Injectable 1500 milliGRAM(s) IV Push every 12 hours  metroNIDAZOLE    Tablet 500 milliGRAM(s) Oral three times a day  mupirocin 2% Ointment 1 Application(s) Topical every 12 hours  nystatin Ointment 1 Application(s) Topical every 12 hours  pyridoxine 100 milliGRAM(s) Oral daily  vancomycin  IVPB 1000 milliGRAM(s) IV Intermittent every 24 hours  vitamin A & D Ointment 1 Application(s) Topical two times a day    MEDICATIONS  (PRN):  dextrose Oral Gel 15 Gram(s) Oral once PRN Blood Glucose LESS THAN 70 milliGRAM(s)/deciliter      Allergies  No Known Allergies      Review of Systems:   unable to obtain    Physical Examination:  T(C): 36.4 (12-13-24 @ 21:07), Max: 36.6 (12-13-24 @ 04:47)  HR: 100 (12-13-24 @ 21:07) (96 - 101)  BP: 164/76 (12-13-24 @ 21:07) (152/69 - 164/76)  RR: 18 (12-13-24 @ 21:07) (18 - 19)  SpO2: 98% (12-13-24 @ 21:07) (98% - 98%)      12-12-24 @ 07:01  -  12-13-24 @ 07:00  --------------------------------------------------------  IN: 0 mL / OUT: 900 mL / NET: -900 mL        GENERAL: AAOx3, no acute distress.  HEAD:  Atraumatic, Normocephalic  EYES: conjunctiva and sclera clear  NECK: Supple, no JVD or thyromegaly  CHEST/LUNG: Clear to auscultation bilaterally; No wheeze, rhonchi, or rales  HEART: Regular rate and rhythm; normal S1, S2, No murmurs.  ABDOMEN: Soft, nontender, nondistended; erythema around peg site  NEUROLOGY: No asterixis or tremor.   SKIN: Intact, no jaundice     Data:                        9.1    18.19 )-----------( 352      ( 13 Dec 2024 06:57 )             27.5     Hgb trend:  9.1  12-13-24 @ 06:57  9.2  12-12-24 @ 06:03  10.5  12-11-24 @ 14:38      12-10-24 @ 07:01  -  12-11-24 @ 07:00  --------------------------------------------------------  IN: 356 mL      12-13    142  |  113[H]  |  42[H]  ----------------------------<  156[H]  4.5   |  15[L]  |  1.0    Ca    8.8      13 Dec 2024 16:00  Phos  6.5     12-12  Mg     1.9     12-13    TPro  4.8[L]  /  Alb  2.8[L]  /  TBili  0.4  /  DBili  x   /  AST  39  /  ALT  28  /  AlkPhos  105  12-13    Liver panel trend:  TBili 0.4   /   AST 39   /   ALT 28   /   AlkP 105   /   Tptn 4.8   /   Alb 2.8    /   DBili --      12-13  TBili 0.6   /   AST 37   /   ALT 30   /   AlkP 94   /   Tptn 4.7   /   Alb 2.8    /   DBili --      12-12  TBili 0.6   /   AST 35   /   ALT 31   /   AlkP 108   /   Tptn 5.2   /   Alb 3.0    /   DBili --      12-11  TBili 0.3   /   AST 36   /   ALT 25   /   AlkP 97   /   Tptn 5.3   /   Alb 3.3    /   DBili --      12-10      PT/INR - ( 12 Dec 2024 06:03 )   PT: 14.80 sec;   INR: 1.25 ratio         PTT - ( 12 Dec 2024 06:03 )  PTT:35.5 sec    Culture - Fungal, Body Fluid (collected 11 Dec 2024 10:26)  Source: Pleural Fl  Preliminary Report (13 Dec 2024 16:53):    No growth    Culture - Body Fluid with Gram Stain (collected 11 Dec 2024 10:26)  Source: Pleural Fl  Gram Stain (11 Dec 2024 20:35):    polymorphonuclear leukocytes seen    No organisms seen    by cytocentrifuge  Preliminary Report (12 Dec 2024 12:44):    No growth to date         Radiology:       Gastroenterology progress note:     Patient is a 79y old  Female who presents with a chief complaint of      Admitted on: 12-10-24    We are following the patient for:peg site infection        Interval History:    No acute events overnight.   - Diet - tolerating peg feeds  - last BM - today   - Abdominal pain - no tenderness on exam      PAST MEDICAL & SURGICAL HISTORY:  DM (diabetes mellitus)      MDS (myelodysplastic syndrome)      SDH (subdural hematoma)      Seizure disorder      Chronic kidney disease (CKD)      H/O colonoscopy      S/P percutaneous endoscopic gastrostomy (PEG) tube placement      S/P craniotomy          MEDICATIONS  (STANDING):  amantadine Syrup 50 milliGRAM(s) Oral two times a day  amLODIPine   Tablet 5 milliGRAM(s) Oral at bedtime  cefepime   IVPB 1000 milliGRAM(s) IV Intermittent every 12 hours  chlorhexidine 2% Cloths 1 Application(s) Topical <User Schedule>  dextrose 5%. 1000 milliLiter(s) (100 mL/Hr) IV Continuous <Continuous>  dextrose 5%. 1000 milliLiter(s) (50 mL/Hr) IV Continuous <Continuous>  dextrose 50% Injectable 25 Gram(s) IV Push once  dextrose 50% Injectable 12.5 Gram(s) IV Push once  dextrose 50% Injectable 25 Gram(s) IV Push once  enoxaparin Injectable 30 milliGRAM(s) SubCutaneous every 24 hours  famotidine   Suspension 20 milliGRAM(s) Oral daily  glucagon  Injectable 1 milliGRAM(s) IntraMuscular once  insulin glargine Injectable (LANTUS) 5 Unit(s) SubCutaneous at bedtime  insulin lispro (ADMELOG) corrective regimen sliding scale   SubCutaneous three times a day before meals  labetalol 200 milliGRAM(s) Oral every 8 hours  lacosamide IVPB 200 milliGRAM(s) IV Intermittent every 12 hours  levETIRAcetam   Injectable 1500 milliGRAM(s) IV Push every 12 hours  metroNIDAZOLE    Tablet 500 milliGRAM(s) Oral three times a day  mupirocin 2% Ointment 1 Application(s) Topical every 12 hours  nystatin Ointment 1 Application(s) Topical every 12 hours  pyridoxine 100 milliGRAM(s) Oral daily  vancomycin  IVPB 1000 milliGRAM(s) IV Intermittent every 24 hours  vitamin A & D Ointment 1 Application(s) Topical two times a day    MEDICATIONS  (PRN):  dextrose Oral Gel 15 Gram(s) Oral once PRN Blood Glucose LESS THAN 70 milliGRAM(s)/deciliter      Allergies  No Known Allergies      Review of Systems:   unable to obtain    Physical Examination:  T(C): 36.4 (12-13-24 @ 21:07), Max: 36.6 (12-13-24 @ 04:47)  HR: 100 (12-13-24 @ 21:07) (96 - 101)  BP: 164/76 (12-13-24 @ 21:07) (152/69 - 164/76)  RR: 18 (12-13-24 @ 21:07) (18 - 19)  SpO2: 98% (12-13-24 @ 21:07) (98% - 98%)      12-12-24 @ 07:01  -  12-13-24 @ 07:00  --------------------------------------------------------  IN: 0 mL / OUT: 900 mL / NET: -900 mL        GENERAL: AAOx3, no acute distress.  HEAD:  Atraumatic, Normocephalic  EYES: conjunctiva and sclera clear  NECK: Supple, no JVD or thyromegaly  CHEST/LUNG: Clear to auscultation bilaterally; No wheeze, rhonchi, or rales  HEART: Regular rate and rhythm; normal S1, S2, No murmurs.  ABDOMEN: Soft, nontender, nondistended; mild erythema around peg site  NEUROLOGY: No asterixis or tremor.   SKIN: Intact, no jaundice     Data:                        9.1    18.19 )-----------( 352      ( 13 Dec 2024 06:57 )             27.5     Hgb trend:  9.1  12-13-24 @ 06:57  9.2  12-12-24 @ 06:03  10.5  12-11-24 @ 14:38      12-10-24 @ 07:01  -  12-11-24 @ 07:00  --------------------------------------------------------  IN: 356 mL      12-13    142  |  113[H]  |  42[H]  ----------------------------<  156[H]  4.5   |  15[L]  |  1.0    Ca    8.8      13 Dec 2024 16:00  Phos  6.5     12-12  Mg     1.9     12-13    TPro  4.8[L]  /  Alb  2.8[L]  /  TBili  0.4  /  DBili  x   /  AST  39  /  ALT  28  /  AlkPhos  105  12-13    Liver panel trend:  TBili 0.4   /   AST 39   /   ALT 28   /   AlkP 105   /   Tptn 4.8   /   Alb 2.8    /   DBili --      12-13  TBili 0.6   /   AST 37   /   ALT 30   /   AlkP 94   /   Tptn 4.7   /   Alb 2.8    /   DBili --      12-12  TBili 0.6   /   AST 35   /   ALT 31   /   AlkP 108   /   Tptn 5.2   /   Alb 3.0    /   DBili --      12-11  TBili 0.3   /   AST 36   /   ALT 25   /   AlkP 97   /   Tptn 5.3   /   Alb 3.3    /   DBili --      12-10      PT/INR - ( 12 Dec 2024 06:03 )   PT: 14.80 sec;   INR: 1.25 ratio         PTT - ( 12 Dec 2024 06:03 )  PTT:35.5 sec    Culture - Fungal, Body Fluid (collected 11 Dec 2024 10:26)  Source: Pleural Fl  Preliminary Report (13 Dec 2024 16:53):    No growth    Culture - Body Fluid with Gram Stain (collected 11 Dec 2024 10:26)  Source: Pleural Fl  Gram Stain (11 Dec 2024 20:35):    polymorphonuclear leukocytes seen    No organisms seen    by cytocentrifuge  Preliminary Report (12 Dec 2024 12:44):    No growth to date         Radiology:

## 2024-12-13 NOTE — PROGRESS NOTE ADULT - ASSESSMENT
A 79-year-old woman with medical history including myelodysplastic syndrome (MDS) followed by Dr. Wade (requiring periodic transfusions for anemia), diabetes mellitus, hypertension, chronic kidney disease 2, and multiple recent admissions for a b/l subdural hematomas (SDH) with mass effect and midline shift (status-post evacuation and middle meningeal artery embolization complicated by a leaking craniotomy site and concern for status epilepticus) presented to the ED from her skilled nursing facility (SNF) due to lethargy and fever.  She is nonverbal, bedbound, and has a PEG tube.  She also has a recent history of *Enterococcus faecalis* urinary tract infection.    The SNF staff reported that the patient has been experiencing spiking fevers for 10 days PTA.  A chest x-ray obtained at the SNF demonstrated infiltrates, prompting initiation of vancomycin for suspected pneumonia.  Her white blood cell count was elevated at 18,000/µL.  Urinalysis and urine culture were negative, as were blood cultures.  The patient has persistent anemia despite receiving weekly darbepoetin zachery (Aranesp) injections three times a week, with the last dose administered on Monday 12/9.  Her most recent hemoglobin level was 5.8 g/dL, prompting the SNF to contact the [facility] for a possible transfusion.  However, due to concerns about the patient's current infectious picture, the transfusion was deferred, and the patient was sent to the ED for further evaluation. On presentation to the ED, the patient was lethargic. No nausea, vomiting, diarrhea, abdominal pain, chest pain, or sick contacts.     # This is a STAR patient - GOC done    # pt is immunocompromised 2/2 age, MDS, DM    # pt is non-verbal and has functional quadriplegia. Prior to arrival to ED on 10/8/24 for being found unresponsive on floor, pt was independent of all ADLs, lived alone, took care of herself well and had no dementia.    # Sepsis POA 2/2 PNA and PEG cellulitis; has diarrhea  remains tachy; afebrile  now off O2   WBC 15.63 -> 20.8 -> 25.6 -> 18.2 ->  UA positive for yeast-like cells, few bacteria, moderate leuk est and WBC  UCx: 10-49K candida albicans - ID said no tx  Pl Fl Cx: neg  BCx: neg  PEG site Cx (body fluid 12/11): neg so far - f/u  MRSA nares: neg  C Diff: neg  U Strp/Leg: never sent (OK)  GI PCR: never sent (OK)  V Dupl: neg DVT (dimer 1822)  CXR: decr rt side opac; poss incr lt side opac  CT C: Lg b/l pl eff w/ adj comp atelec w/ near complete collapse LLL and complete collapse RLL  CT A/P: mild edema of colon w/ mucosal hyperremia; no bowel obst; tr ascites; anasarca  ID noted  abx: c/w cefepime, vanc and flagyl   Bactroban oint top q12 to PEG site  Clotrimazole oint top q12 to PEG site  Please cleanse under PEG site with Vashe twice daily PRN for soiling and pat dry.  s/p thoracentesis (12/11) with removal of 1L serous fluid (transudative)  no laxatives - diarrhea improving  cbc q24    # stg 3 sacrococcygeal decub ulcer  wound care RN noted  can use rectal tube to divert stool away from ulcer - Patient will benefit from fecal incontinence  bag due to multiple episodes of liquid diarrhea    Cleanse wound with soap and water, pat dry. Apply Triad cream twice daily PRN for soiling to sacrococcygeal area, groin, buttock and thighs.  Skin and incontinence care.  Pressure Injury Prevention.    # MDS with normo Anemia  aranesp not helpful in past  sees Dr Wade (h/o)  tx dependent  s/p 2u pRBCs   keep hgb > 7.5  try to limit excessive labs    # Hypernatremia likely 2/2 to dec PO intake/dehydration:   Na: 153 -> 150 -> 147 ->   IVFs: 1/2 /hr - once Na better, try to get off IVFs and put ALL hydration into PEG  adj PEG feeds/fluids  Feeds: glucerna 360cc PEG q8  Flush: 100/100cc pre/post q8  Free Water: 400cc 2hrs after each feed q8  BMP 4pm and AM    # hyperkalemia - better    # hyperphosphatemia - mild  f/u in couple days    # Ca at upper limits nl  observe    # DM  glucerna feeds (above)  FS qac/hs - goal 100-180 - hyperglycemia  start lantus 5 HS  might need lisp w/ feeds or incr lantus  SSI w/ feeds q8 - adjust as needed    # Recent admission for b/l SDH w/ mass effect + midline shift s/p evacuation  s/p MMA embolization, c/b leaking from crani site and concern for status epilepticus   CTH :   1.  The right frontal convexity extra-axial collection has decreased in size and density.  2.  The left frontal convexity isodense subdural collection has not significantly changed.  c/w Keppra 1500 mg iv q12 - can convert to PEG w/ liquid  c/w Lacosamide 200 iv q12  - can convert to PEG w/ liquid or tablet  c/w amantadine 50mg PEG q12  c/w Vit B6 100mg PEG q24    # HTN - not controlled  Echo 10/9/24: EF 65 to 70%; mild LAE; mild TR; no pHTN  HOLD lisinopril 20mg q24  incr labetalol 200mg peg q8 - hold BP < 100/60, HR < 55 (usual dose 200mg peg q8)  restart norvasc @ 5mg peg q24 - hold if BP < 100/60 (usual dose 10mg peg q24)    # CKD 2; minor metab acidosis  (Cr 1.0 at baseline)  stable     # DVT ppx: LMWH 30mg sc q24    # GI ppx: pepcid 20 peg q12    # Activity: bedbound    # full code     Dispo: tx DM; tx BP: tx Na; f/u lytes; daily labs; f/u ID re abx; IVFs - convert to PEG water; PEG feeds; f/u diarrhea; PEG site care; sacral wound care;   eventually, pt will go back for STR at SNF ([facility]) - f/u CM - still acute    Prog is very guarded.

## 2024-12-13 NOTE — PROGRESS NOTE ADULT - ASSESSMENT
· Assessment	  79-year-old female with a complex medical history including myelodysplastic syndrome (MDS) followed by Dr. Wade (requiring periodic transfusions for anemia), diabetes mellitus, hypertension, chronic kidney disease, and multiple recent admissions for a right-sided subdural hematoma (SDH) with mass effect and midline shift (status-post evacuation and middle meningeal artery embolization complicated by a leaking craniotomy site and concern for status epilepticus) presented to the ED from her skilled nursing facility (SNF) due to lethargy and fever.     IMPRESSION/RECOMMENDATIONS  Immunosuppression/Immunosenescence ( above age 60 yrs there is a exponential decline in immunity which could result in poor clinical outcomes.  Acute illness ( sepsis/UTI/PNA )  which poses a threat to life or bodily function without treatment   Sepsis ( /m, WBC 15.6 >25.6>18.1 )   Hypoxic respiratory failure   PNA : bibasilar  12/12 CT angio p  < from: CT Chest w/ IV Cont (12.10.24 @ 14:31) > ( Independent interpretation of test : PNA  1.  Large bilateral pleural effusions with adjacent compressive   atelectasis resulting in near complete left and complete rightlower lobe   collapse.  2.  Mild edematous thickening of the colon with mucosal hyperemia can be   seen with colitis of infectious or inflammatory etiology. No bowel   obstruction.    12/12 Nares ORSA NG  12/10 CXR opacites b/l. ( Independent interpretation of test : PNA  12/11 S/p R thoracocentesis : 1000 ccs of fluid. NG cultures. Independent analysis of CX results and interpretation.    Acute pyelonephritis with no obstructive uropathy   Pyuria   12/10 UCx C albicans. Independent analysis of CX results and interpretation     Colitis on CT with diarrhea. CD NG    Pressure related sacral ulcer : non infected. Off loading    G tube site : has cellulitis. R/o associated abscess        -monitor WBC  -Urine for strep pneumonia/legionella antigen  -Nares ORSA  -Sputum gm stain, cultures  -BCx   -Off loading to prevent pressure sores and preventive measures to avoid aspiration  -GI evaluation of G tube site  -Vancomycin 1 gm iv q12h. Vancomycin will be monitored daily for nephrotoxicity by checking the GFR  -Cefepime 2 gm iv q8h  -Flagyl 500 mg via G tube q8h    Discussion of management/test results/antibiotic regimen  with primary medical team. Dr Yadav

## 2024-12-13 NOTE — PROGRESS NOTE ADULT - SUBJECTIVE AND OBJECTIVE BOX
SIMONA KUHN  79y  Female  ***My note supersedes ALL resident notes that I sign.  My corrections for their notes are in my note.***    I can be reached directly via Teams or my office number is [phone] or [phone].    INTERVAL EVENTS: Here for f/u of PNA. Pt the same. Not awake or alert. Not interactive.    T(F): 97.8 (12-13-24 @ 13:15), Max: 98 (12-12-24 @ 20:59)  HR: 96 (12-13-24 @ 13:15) (96 - 101)  BP: 163/75 (12-13-24 @ 13:15) (152/69 - 163/75)  RR: 18 (12-13-24 @ 13:15) (18 - 20)  SpO2: 96% (12-12-24 @ 20:59) (96% - 96%)    12-12-24 @ 07:01  -  12-13-24 @ 07:00  --------------------------------------------------------  IN: 0 mL / OUT: 700 mL / NET: -700 mL    Gen: NAD  HEENT: PERRL - 3-4mm, mouth clr, nose clr; off NC O2  Neck: no nodes, no JVD, thyroid nl  lungs: clr  hrt: s1 s2 reg, tachy, no murmur  abd: soft, NT/ND, no HS megaly; + PEG site w/ white/yellow creamy d/c and cellulitis (pat under bumper)  : ocampo out for TOV  ext: no edema, no c/c  neuro: lethargic, non-verb; not interactive;     LABS:                      9.1     (    91.4   18.19 )-----------( ---------      352      ( 13 Dec 2024 06:57 )             27.5    (    15.8     WBC Count: 18.19 K/uL (12-13-24 @ 06:57) - starting to improve  WBC Count: 25.61 K/uL (12-12-24 @ 06:03)  WBC Count: 20.77 K/uL (12-11-24 @ 14:38)  WBC Count: 15.63 K/uL (12-10-24 @ 15:27)    Hemoglobin: 9.1 g/dL (12-13 @ 06:57)  Hemoglobin: 9.2 g/dL (12-12 @ 06:03)  Hemoglobin: 10.5 g/dL (12-11 @ 14:38)  Hemoglobin: 5.2 g/dL (12-10 @ 15:27)    147   (   114   (   206      12-13-24 @ 06:57  ----------------------               4.7   (   19   (   44                             -----                        1.0  Ca  9.3   Mg  1.9    P   --     Sodium: 147 mmol/L (12-13-24 @ 06:57) - slow improvement  Sodium: 149 mmol/L (12-12-24 @ 16:10)  Sodium: 150 mmol/L (12-12-24 @ 06:03)  Sodium: 150 mmol/L (12-12-24 @ 00:04)  Sodium: 151 mmol/L (12-11-24 @ 14:38)  Sodium: 153 mmol/L (12-10-24 @ 14:01)    149   (   116   (   172      12-12-24 @ 16:10  ----------------------               4.5   (   21   (   48                             -----                        1.0  Ca  9.2   Mg  --    P   --     LFT  4.8  (  0.4  (  39       12-13-24 @ 06:57  -------------------------  2.8  (  105  (  28    PT/INR - ( 12 Dec 2024 06:03 )   PT: 14.80 sec;   INR: 1.25 ratio    PTT - ( 12 Dec 2024 06:03 )  PTT: 35.5 sec    Urinalysis Basic - ( 13 Dec 2024 06:57 )    Color: x / Appearance: x / SG: x / pH: x  Gluc: 206 mg/dL / Ketone: x  / Bili: x / Urobili: x   Blood: x / Protein: x / Nitrite: x   Leuk Esterase: x / RBC: x / WBC x   Sq Epi: x / Non Sq Epi: x / Bacteria: x    CAPILLARY BLOOD GLUCOSE  POCT Blood Glucose.: 233 (12-13-24 @ 11:18)  POCT Blood Glucose.: 199 (12-13-24 @ 08:21)  POCT Blood Glucose.: 221 (12-13-24 @ 05:48)  POCT Blood Glucose.: 189 (12-12-24 @ 21:35)  POCT Blood Glucose.: 168 (12-12-24 @ 16:59)  POCT Blood Glucose.: 253 (12-12-24 @ 11:37)  POCT Blood Glucose.: 215 (12-12-24 @ 06:03)    Culture - Body Fluid with Gram Stain (collected 12-11-24 @ 10:26)  Source: Pleural Fl  Gram Stain (12-11-24 @ 20:35):    polymorphonuclear leukocytes seen    No organisms seen    by cytocentrifuge  Preliminary Report (12-12-24 @ 12:44):    No growth to date    Culture - Urine (collected 12-10-24 @ 15:00)  Source: Catheterized None  Final Report (12-12-24 @ 13:43):    10,000 - 49,000 CFU/mL Candida albicans    "Susceptibilities not performed"    Culture - Blood (collected 12-10-24 @ 14:01)  Source: .Blood BLOOD  Preliminary Report (12-12-24 @ 23:01):    No growth at 48 Hours    Culture - Blood (collected 12-10-24 @ 14:01)  Source: .Blood BLOOD  Preliminary Report (12-12-24 @ 23:01):    No growth at 48 Hours    RADIOLOGY & ADDITIONAL TESTS:    MEDICATIONS:  cefepime   IVPB 1000 milliGRAM(s) IV Intermittent every 12 hours  metroNIDAZOLE    Tablet 500 milliGRAM(s) Oral three times a day  vancomycin  IVPB 1000 milliGRAM(s) IV Intermittent every 24 hours    amantadine Syrup 50 milliGRAM(s) Oral two times a day  chlorhexidine 2% Cloths 1 Application(s) Topical <User Schedule>  enoxaparin Injectable 30 milliGRAM(s) SubCutaneous every 24 hours  famotidine   Suspension 20 milliGRAM(s) Oral daily  insulin glargine Injectable (LANTUS) 5 Unit(s) SubCutaneous at bedtime  insulin lispro (ADMELOG) corrective regimen sliding scale   SubCutaneous three times a day before meals  labetalol 100 milliGRAM(s) Oral every 8 hours  lacosamide IVPB 200 milliGRAM(s) IV Intermittent every 12 hours  levETIRAcetam   Injectable 1500 milliGRAM(s) IV Push every 12 hours  mupirocin 2% Ointment 1 Application(s) Topical every 12 hours  nystatin Ointment 1 Application(s) Topical every 12 hours  pyridoxine 100 milliGRAM(s) Oral daily  sodium chloride 0.45%. 1000 milliLiter(s) IV Continuous <Continuous>  vitamin A & D Ointment 1 Application(s) Topical two times a day

## 2024-12-13 NOTE — PROGRESS NOTE ADULT - ASSESSMENT
A 79-year-old female with a complex medical history including myelodysplastic syndrome (MDS) followed by Dr. Wade (requiring periodic transfusions for anemia), diabetes mellitus, hypertension, chronic kidney disease, and multiple recent admissions for a right-sided subdural hematoma (SDH) with mass effect and midline shift (status-post evacuation and middle meningeal artery embolization complicated by a leaking craniotomy site and concern for status epilepticus) presented to the ED from her skilled nursing facility due to lethargy and fever. Gi consulted for PEG tube infection.       #PEG tube with surrounding cellulitis   PEG placed 11/12/2024. No complications. Unremarkable EGD.   CTAP shows no buried bumper, no abscess. PEG tube positioned in gastric lumen  ON exam PEg site with surround cellulitis and pus from stoma  PEG flushing well. Received PEG feeds last night    Rec  ID recs appreciated. Cw IV abx per ID  No need to exchange PEG tube at this time  Monitor gastrostomy/peristomal site  Serial abd exams  Can use PEG for feeds as tolerated  Clean the PEG site with soap and water daily  Apply clean dressing to the site  Tape the tube to the skin and abdominal binder to avoid tugging  Flush the tube with 30cc of water before and after feeds and medications    #colitis - r/o infectious etiology  Reported loose stools  CTAP: Mild edematous thickening of the colon with mucosal hyperemia can be   seen with colitis of infectious or inflammatory etiology. No bowel   obstruction.  Recent abx use    Rec  Send stool studies C. diff, GI PCR, stool OP  Monitor BMs and document in chart      #Acute on chronic anemia - no overt bleeding   #Hx MDS  HD stable no active bleeding  KAREN shows brown stool   PEG lavage shows no blood  Hgb 5.2-> 10 s/p 2 unit PRBC    Rec  Trend H/H  TRansfuse hgb target >7   IN the absence of overt bleeding, negative PEG lavage, and brown stool on KAREN do not recommend endoscopic evaluation at this time   Please alert GI if any overt bleeding     A 79-year-old female with a complex medical history including myelodysplastic syndrome (MDS) followed by Dr. Wade (requiring periodic transfusions for anemia), diabetes mellitus, hypertension, chronic kidney disease, and multiple recent admissions for a right-sided subdural hematoma (SDH) with mass effect and midline shift (status-post evacuation and middle meningeal artery embolization complicated by a leaking craniotomy site and concern for status epilepticus) presented to the ED from her skilled nursing facility due to lethargy and fever. Gi consulted for PEG tube infection.       #PEG tube with surrounding cellulitis   PEG placed 11/12/2024. No complications. Unremarkable EGD.   CTAP shows no buried bumper, no abscess. PEG tube positioned in gastric lumen  ON exam PEg site with surround cellulitis and pus from stoma  PEG flushing well. Received PEG feeds last night    Rec  ID recs appreciated. Cw IV abx per ID  No need to exchange PEG tube at this time  Monitor gastrostomy/peristomal site  Serial abd exams  Can use PEG for feeds as tolerated  Clean the PEG site with soap and water daily  Apply clean dressing to the site  Tape the tube to the skin and abdominal binder to avoid tugging  Flush the tube with 30cc of water before and after feeds and medications    #colitis - r/o infectious etiology  Reported loose stools  CTAP: Mild edematous thickening of the colon with mucosal hyperemia can be   seen with colitis of infectious or inflammatory etiology. No bowel   obstruction.  Recent abx use  C. diff negative    Rec  Send stool studies GI PCR, stool OP  Monitor BMs and document in chart      #Acute on chronic anemia - no overt bleeding   #Hx MDS  HD stable no active bleeding  KAREN shows brown stool   PEG lavage shows no blood  Hgb 5.2-> 10 s/p 2 unit PRBC    Rec  Trend H/H  TRansfuse hgb target >7   IN the absence of overt bleeding, negative PEG lavage, and brown stool on KAREN do not recommend endoscopic evaluation at this time   Please alert GI if any overt bleeding     A 79-year-old female with a complex medical history including myelodysplastic syndrome (MDS) followed by Dr. Wade (requiring periodic transfusions for anemia), diabetes mellitus, hypertension, chronic kidney disease, and multiple recent admissions for a right-sided subdural hematoma (SDH) with mass effect and midline shift (status-post evacuation and middle meningeal artery embolization complicated by a leaking craniotomy site and concern for status epilepticus) presented to the ED from her skilled nursing facility due to lethargy and fever. Gi consulted for PEG tube infection.       #PEG tube with surrounding cellulitis   PEG placed 11/12/2024. No complications. Unremarkable EGD.   CTAP shows no buried bumper, no abscess. PEG tube positioned in gastric lumen  ON exam PEg site with surround cellulitis and pus from stoma  PEG flushing well. Received PEG feeds last night    Rec  ID recs appreciated. Cw IV abx per ID  No need to exchange PEG tube at this time   Monitor gastrostomy/peristomal site for improvement  Serial abd exams  Can use PEG for feeds as tolerated  Clean the PEG site with soap and water daily  Apply clean dressing to the site  Tape the tube to the skin and abdominal binder to avoid tugging  Flush the tube with 30cc of water before and after feeds and medications    #colitis - r/o infectious etiology  Reported loose stools  CTAP: Mild edematous thickening of the colon with mucosal hyperemia can be   seen with colitis of infectious or inflammatory etiology. No bowel   obstruction.  Recent abx use  C. diff negative    Rec  Send stool studies GI PCR, stool OP  Monitor BMs and document in chart      #Acute on chronic anemia - no overt bleeding   #Hx MDS  HD stable no active bleeding  KAREN shows brown stool   PEG lavage shows no blood  Hgb 5.2-> 10 s/p 2 unit PRBC    Rec  Trend H/H  TRansfuse hgb target >7   IN the absence of overt bleeding, negative PEG lavage, and brown stool on KAREN would defer endoscopic evaluation at this time   Please alert GI if any overt bleeding     A 79-year-old female with a complex medical history including myelodysplastic syndrome (MDS) followed by Dr. Wade (requiring periodic transfusions for anemia), diabetes mellitus, hypertension, chronic kidney disease, and multiple recent admissions for a right-sided subdural hematoma (SDH) with mass effect and midline shift (status-post evacuation and middle meningeal artery embolization complicated by a leaking craniotomy site and concern for status epilepticus) presented to the ED from her skilled nursing facility due to lethargy and fever. Gi consulted for PEG tube infection.       #PEG tube with surrounding cellulitis   PEG placed 11/12/2024. No complications. Unremarkable EGD.   CTAP shows no buried bumper, no abscess. PEG tube positioned in gastric lumen  ON exam PEg site with surround cellulitis and pus from stoma  PEG flushing well. Received PEG feeds last night    Rec  ID recs appreciated. Cw IV abx per ID  No need to exchange PEG tube at this time in absence of collection/abscess with functional gastrostomy  Monitor gastrostomy/peristomal site for improvement  Serial abd exams  Can use PEG for feeds as tolerated  Clean the PEG site with soap and water daily  Apply clean dressing to the site  Tape the tube to the skin and abdominal binder to avoid tugging  Flush the tube with 30cc of water before and after feeds and medications    #colitis - r/o infectious etiology  Reported loose stools  CTAP: Mild edematous thickening of the colon with mucosal hyperemia can be   seen with colitis of infectious or inflammatory etiology. No bowel   obstruction.  Recent abx use  C. diff negative    Rec  Send stool studies GI PCR, stool OP  Monitor BMs and document in chart      #Acute on chronic anemia - no overt bleeding   #Hx MDS  HD stable no active bleeding  KAREN shows brown stool   PEG lavage shows no blood  Hgb 5.2-> 10 s/p 2 unit PRBC    Rec  Trend H/H  TRansfuse hgb target >7   IN the absence of overt bleeding, negative PEG lavage, and brown stool on KAREN would defer endoscopic evaluation at this time   Please alert GI if any overt bleeding

## 2024-12-14 LAB
A1C WITH ESTIMATED AVERAGE GLUCOSE RESULT: 7.1 % — HIGH (ref 4–5.6)
ANION GAP SERPL CALC-SCNC: 11 MMOL/L — SIGNIFICANT CHANGE UP (ref 7–14)
BASOPHILS # BLD AUTO: 0.17 K/UL — SIGNIFICANT CHANGE UP (ref 0–0.2)
BASOPHILS NFR BLD AUTO: 1.3 % — HIGH (ref 0–1)
BUN SERPL-MCNC: 40 MG/DL — HIGH (ref 10–20)
CALCIUM SERPL-MCNC: 9.1 MG/DL — SIGNIFICANT CHANGE UP (ref 8.4–10.5)
CHLORIDE SERPL-SCNC: 112 MMOL/L — HIGH (ref 98–110)
CO2 SERPL-SCNC: 19 MMOL/L — SIGNIFICANT CHANGE UP (ref 17–32)
CREAT SERPL-MCNC: 0.9 MG/DL — SIGNIFICANT CHANGE UP (ref 0.7–1.5)
CULTURE RESULTS: SIGNIFICANT CHANGE UP
EGFR: 65 ML/MIN/1.73M2 — SIGNIFICANT CHANGE UP
EOSINOPHIL # BLD AUTO: 0.46 K/UL — SIGNIFICANT CHANGE UP (ref 0–0.7)
EOSINOPHIL NFR BLD AUTO: 3.5 % — SIGNIFICANT CHANGE UP (ref 0–8)
ESTIMATED AVERAGE GLUCOSE: 157 MG/DL — HIGH (ref 68–114)
GLUCOSE BLDC GLUCOMTR-MCNC: 105 MG/DL — HIGH (ref 70–99)
GLUCOSE BLDC GLUCOMTR-MCNC: 124 MG/DL — HIGH (ref 70–99)
GLUCOSE BLDC GLUCOMTR-MCNC: 159 MG/DL — HIGH (ref 70–99)
GLUCOSE BLDC GLUCOMTR-MCNC: 178 MG/DL — HIGH (ref 70–99)
GLUCOSE BLDC GLUCOMTR-MCNC: 189 MG/DL — HIGH (ref 70–99)
GLUCOSE SERPL-MCNC: 103 MG/DL — HIGH (ref 70–99)
HCT VFR BLD CALC: 26.1 % — LOW (ref 37–47)
HGB BLD-MCNC: 8.3 G/DL — LOW (ref 12–16)
IMM GRANULOCYTES NFR BLD AUTO: 1 % — HIGH (ref 0.1–0.3)
LYMPHOCYTES # BLD AUTO: 1.98 K/UL — SIGNIFICANT CHANGE UP (ref 1.2–3.4)
LYMPHOCYTES # BLD AUTO: 14.9 % — LOW (ref 20.5–51.1)
MCHC RBC-ENTMCNC: 29.9 PG — SIGNIFICANT CHANGE UP (ref 27–31)
MCHC RBC-ENTMCNC: 31.8 G/DL — LOW (ref 32–37)
MCV RBC AUTO: 93.9 FL — SIGNIFICANT CHANGE UP (ref 81–99)
MONOCYTES # BLD AUTO: 1.51 K/UL — HIGH (ref 0.1–0.6)
MONOCYTES NFR BLD AUTO: 11.4 % — HIGH (ref 1.7–9.3)
NEUTROPHILS # BLD AUTO: 9.04 K/UL — HIGH (ref 1.4–6.5)
NEUTROPHILS NFR BLD AUTO: 67.9 % — SIGNIFICANT CHANGE UP (ref 42.2–75.2)
NRBC # BLD: 0 /100 WBCS — SIGNIFICANT CHANGE UP (ref 0–0)
PH FLD: 8.2 — SIGNIFICANT CHANGE UP
PLATELET # BLD AUTO: 336 K/UL — SIGNIFICANT CHANGE UP (ref 130–400)
PMV BLD: 11.4 FL — HIGH (ref 7.4–10.4)
POTASSIUM SERPL-MCNC: 4.3 MMOL/L — SIGNIFICANT CHANGE UP (ref 3.5–5)
POTASSIUM SERPL-SCNC: 4.3 MMOL/L — SIGNIFICANT CHANGE UP (ref 3.5–5)
RBC # BLD: 2.78 M/UL — LOW (ref 4.2–5.4)
RBC # FLD: 15.8 % — HIGH (ref 11.5–14.5)
SODIUM SERPL-SCNC: 142 MMOL/L — SIGNIFICANT CHANGE UP (ref 135–146)
SPECIMEN SOURCE: SIGNIFICANT CHANGE UP
WBC # BLD: 13.29 K/UL — HIGH (ref 4.8–10.8)
WBC # FLD AUTO: 13.29 K/UL — HIGH (ref 4.8–10.8)

## 2024-12-14 PROCEDURE — 99233 SBSQ HOSP IP/OBS HIGH 50: CPT

## 2024-12-14 RX ADMIN — METRONIDAZOLE 500 MILLIGRAM(S): 500 TABLET ORAL at 22:13

## 2024-12-14 RX ADMIN — Medication 1 APPLICATION(S): at 17:07

## 2024-12-14 RX ADMIN — CEFEPIME 100 MILLIGRAM(S): 2 INJECTION, POWDER, FOR SOLUTION INTRAVENOUS at 17:04

## 2024-12-14 RX ADMIN — CEFEPIME 100 MILLIGRAM(S): 2 INJECTION, POWDER, FOR SOLUTION INTRAVENOUS at 05:15

## 2024-12-14 RX ADMIN — ENOXAPARIN SODIUM 30 MILLIGRAM(S): 30 INJECTION SUBCUTANEOUS at 08:15

## 2024-12-14 RX ADMIN — Medication 50 MILLIGRAM(S): at 17:04

## 2024-12-14 RX ADMIN — AMLODIPINE BESYLATE 5 MILLIGRAM(S): 10 TABLET ORAL at 22:13

## 2024-12-14 RX ADMIN — Medication 250 MILLIGRAM(S): at 05:15

## 2024-12-14 RX ADMIN — LABETALOL 200 MILLIGRAM(S): 100 TABLET, FILM COATED ORAL at 22:13

## 2024-12-14 RX ADMIN — INSULIN GLARGINE 5 UNIT(S): 100 INJECTION, SOLUTION SUBCUTANEOUS at 22:13

## 2024-12-14 RX ADMIN — Medication 50 MILLIGRAM(S): at 05:16

## 2024-12-14 RX ADMIN — LABETALOL 200 MILLIGRAM(S): 100 TABLET, FILM COATED ORAL at 05:18

## 2024-12-14 RX ADMIN — Medication 2: at 12:00

## 2024-12-14 RX ADMIN — LEVETIRACETAM 1500 MILLIGRAM(S): 1000 TABLET ORAL at 05:20

## 2024-12-14 RX ADMIN — LACOSAMIDE 140 MILLIGRAM(S): 10 INJECTION INTRAVENOUS at 05:16

## 2024-12-14 RX ADMIN — METRONIDAZOLE 500 MILLIGRAM(S): 500 TABLET ORAL at 05:18

## 2024-12-14 RX ADMIN — Medication 2: at 08:14

## 2024-12-14 RX ADMIN — LACOSAMIDE 140 MILLIGRAM(S): 10 INJECTION INTRAVENOUS at 17:04

## 2024-12-14 RX ADMIN — METRONIDAZOLE 500 MILLIGRAM(S): 500 TABLET ORAL at 13:48

## 2024-12-14 RX ADMIN — NYSTATIN 1 APPLICATION(S): 100000 POWDER TOPICAL at 05:19

## 2024-12-14 RX ADMIN — LEVETIRACETAM 1500 MILLIGRAM(S): 1000 TABLET ORAL at 17:05

## 2024-12-14 RX ADMIN — Medication 1 APPLICATION(S): at 17:04

## 2024-12-14 RX ADMIN — Medication 1 APPLICATION(S): at 05:17

## 2024-12-14 RX ADMIN — CHLORHEXIDINE GLUCONATE 1 APPLICATION(S): 1.2 RINSE ORAL at 05:19

## 2024-12-14 RX ADMIN — NYSTATIN 1 APPLICATION(S): 100000 POWDER TOPICAL at 17:07

## 2024-12-14 RX ADMIN — Medication 1 APPLICATION(S): at 05:18

## 2024-12-14 RX ADMIN — FAMOTIDINE 20 MILLIGRAM(S): 20 TABLET, FILM COATED ORAL at 12:55

## 2024-12-14 RX ADMIN — LABETALOL 200 MILLIGRAM(S): 100 TABLET, FILM COATED ORAL at 13:48

## 2024-12-14 RX ADMIN — Medication 100 MILLIGRAM(S): at 12:01

## 2024-12-14 NOTE — DIETITIAN INITIAL EVALUATION ADULT - PERTINENT MEDS FT
MEDICATIONS  (STANDING):  amantadine Syrup 50 milliGRAM(s) Oral two times a day  amLODIPine   Tablet 5 milliGRAM(s) Oral at bedtime  cefepime   IVPB 1000 milliGRAM(s) IV Intermittent every 12 hours  chlorhexidine 2% Cloths 1 Application(s) Topical <User Schedule>  dextrose 5%. 1000 milliLiter(s) (100 mL/Hr) IV Continuous <Continuous>  dextrose 5%. 1000 milliLiter(s) (50 mL/Hr) IV Continuous <Continuous>  dextrose 50% Injectable 25 Gram(s) IV Push once  dextrose 50% Injectable 12.5 Gram(s) IV Push once  dextrose 50% Injectable 25 Gram(s) IV Push once  enoxaparin Injectable 30 milliGRAM(s) SubCutaneous every 24 hours  famotidine   Suspension 20 milliGRAM(s) Oral daily  glucagon  Injectable 1 milliGRAM(s) IntraMuscular once  insulin glargine Injectable (LANTUS) 5 Unit(s) SubCutaneous at bedtime  insulin lispro (ADMELOG) corrective regimen sliding scale   SubCutaneous three times a day before meals  labetalol 200 milliGRAM(s) Oral every 8 hours  lacosamide IVPB 200 milliGRAM(s) IV Intermittent every 12 hours  levETIRAcetam   Injectable 1500 milliGRAM(s) IV Push every 12 hours  metroNIDAZOLE    Tablet 500 milliGRAM(s) Oral three times a day  mupirocin 2% Ointment 1 Application(s) Topical every 12 hours  nystatin Ointment 1 Application(s) Topical every 12 hours  pyridoxine 100 milliGRAM(s) Oral daily  vancomycin  IVPB 1000 milliGRAM(s) IV Intermittent every 24 hours  vitamin A & D Ointment 1 Application(s) Topical two times a day    MEDICATIONS  (PRN):  dextrose Oral Gel 15 Gram(s) Oral once PRN Blood Glucose LESS THAN 70 milliGRAM(s)/deciliter

## 2024-12-14 NOTE — DIETITIAN INITIAL EVALUATION ADULT - NS FNS DIET ORDER
Diet, NPO with Tube Feed:   Tube Feeding Modality: Gastrostomy  Glucerna 1.2 Ash (GLUCERNARTH)  Total Volume for 24 Hours (mL): 1080  Bolus  Total Volume of Bolus (mL):  360  Tube Feed Frequency: Every 8 hours   Tube Feed Start Time: 06:00  Bolus Feed Rate (mL per Hour): 500   Bolus Feed Duration (in Hours): 1  Bolus Feed Instructions:  360 mL at 6:00, 12:00, 18:00. check poct and give insulin prior to each feed  Free Water Flush  Bolus   Total Volume per Flush (mL): 200   Frequency: Every 8 Hours   Total Daily Volume of Flush (mL): 450  Free Water Flush Instructions:  flush water 100 ml before and 100 ml after each feeding  Banatrol TF     Qty per Day:  3 (12-13-24 @ 08:45) [Active]

## 2024-12-14 NOTE — DIETITIAN INITIAL EVALUATION ADULT - NUTRITION DIAGNOSIS
Pharmacist is asking if  would like to start her on a preventative statin medication due to the diabetes?   yes...

## 2024-12-14 NOTE — PROGRESS NOTE ADULT - SUBJECTIVE AND OBJECTIVE BOX
Pt seen, pt calm     T(F): , Max: 98.9 (12-14-24 @ 04:10)  HR: 79 (12-14-24 @ 19:57) (75 - 79)  BP: 139/66 (12-14-24 @ 19:57)  RR: 18 (12-14-24 @ 19:57)  SpO2: 95% (12-14-24 @ 19:57)  IN: 1035 mL / OUT: 0 mL / NET: 1035 mL      General: No apparent distress  Cardiovascular: S1, S2  Gastrointestinal: Soft, Non-tender, Non-distended  Respiratory: Good air entry bilaterally  Musculoskeletal: Moves all extremities  Lymphatic: No edema  Dermatologic: Skin dry                          8.3    13.29 )-----------( 336      ( 14 Dec 2024 06:25 )             26.1     12-14    142  |  112[H]  |  40[H]  ----------------------------<  103[H]  4.3   |  19  |  0.9    Ca    9.1      14 Dec 2024 06:25  Mg     1.9     12-13    TPro  4.8[L]  /  Alb  2.8[L]  /  TBili  0.4  /  DBili  x   /  AST  39  /  ALT  28  /  AlkPhos  105  12-13

## 2024-12-14 NOTE — DIETITIAN INITIAL EVALUATION ADULT - OTHER INFO
79-year-old woman with medical history including myelodysplastic syndrome (MDS) followed by Dr. Wade (requiring periodic transfusions for anemia), diabetes mellitus, hypertension, chronic kidney disease 2, and multiple recent admissions for a b/l subdural hematomas (SDH) with mass effect and midline shift (status-post evacuation and middle meningeal artery embolization complicated by a leaking craniotomy site and concern for status epilepticus) presented to the ED from her skilled nursing facility (SNF) due to lethargy and fever.  She is nonverbal, bedbound, and has a PEG tube.  She also has a recent history of *Enterococcus faecalis* urinary tract infection.   pt is non-verbal and has functional quadriplegia. Prior to arrival to ED on 10/8/24 for being found unresponsive on floor, pt was independent of all ADLs, lived alone, took care of herself well and had no dementia.

## 2024-12-14 NOTE — PROGRESS NOTE ADULT - ASSESSMENT
A 79-year-old woman with medical history including myelodysplastic syndrome (MDS) followed by Dr. Wade  diabetes mellitus, hypertension, chronic kidney disease 2, and multiple recent admissions for a b/l subdural hematomas (SDH) presented to the ED from her skilled nursing facility  due to lethargy and fever.      # Sepsis ]2/2 PNA and PEG site cellulitis  c/w cefepime, vanc and flagyl   Bactroban oint to PEG csite    # pleural effusions  no further thora as per pulm  s/p R thora    # stg 3 sacral ulcer  wound care    # MDS  s/p 2u pRBCs     # Hypernatremia   improvin    # DM  ssi and basal    recent SDH with possible seizure.  c/w Keppra 1500 mg iv q12  c/w Lacosamide 200 iv q12    c/w amantadine 50mg PEG q12  c/w Vit B6 100mg PEG q24    # HTN - bettercontrolled  bb and ccb    # CKD 2  stable

## 2024-12-14 NOTE — DIETITIAN INITIAL EVALUATION ADULT - PERTINENT LABORATORY DATA
12-14    142  |  112[H]  |  40[H]  ----------------------------<  103[H]  4.3   |  19  |  0.9    Ca    9.1      14 Dec 2024 06:25  Mg     1.9     12-13    TPro  4.8[L]  /  Alb  2.8[L]  /  TBili  0.4  /  DBili  x   /  AST  39  /  ALT  28  /  AlkPhos  105  12-13  POCT Blood Glucose.: 178 mg/dL (12-14-24 @ 11:13)  A1C with Estimated Average Glucose Result: 7.1 % (12-14-24 @ 06:25)  A1C with Estimated Average Glucose Result: 7.6 % (12-13-24 @ 06:57)  A1C with Estimated Average Glucose Result: 6.0 % (10-09-24 @ 02:40)

## 2024-12-14 NOTE — DIETITIAN INITIAL EVALUATION ADULT - ENERGY INTAKE
Glucerna 1.2 @ 360 mL Q8H + free water flushes 100 mL pre/post feeds   Provides 1296 kcal, 65 g protein, 1470 mL total free water (870 mL free water from formula) -- meets 83% estimated energy needs and 70% estimated protein needs  Banatrol 3x daily  Receiving tube feeding and tolerating @ goal rate

## 2024-12-14 NOTE — DIETITIAN INITIAL EVALUATION ADULT - OTHER CALCULATIONS
WEIGHT USED: 62.1 kg; weight taken on 12/11   ENERGY: 8565-4706 kcal/day (25-30 kcal/kg)  PROTEIN: 75-93 g/day (1.2-1.5 g/kg)  FLUID: 1 mL/kcal  -- Estimated needs with consideration for Age, Weight, BMI, Acuity of illness  WEIGHT USED: 62.1 kg; weight taken on 12/11   ENERGY: 4252-2557 kcal/day (25-30 kcal/kg)  PROTEIN: 75-93 g/day (1.2-1.5 g/kg)  FLUID: 1 mL/kcal  -- Estimated needs with consideration for Age, Weight, BMI, Pressure Injury, Acuity of illness

## 2024-12-14 NOTE — DIETITIAN INITIAL EVALUATION ADULT - ORAL INTAKE PTA/DIET HISTORY
Unable to obtain nutrition hx from patient; nonverbal and AAOx0. No family at bedside, attempted to call with no response.   Nutrition Hx limited to medical chart and physical chart from [facility]. Patient receiving EN regimen of Glucerna 1.2 @ 60 mL/hr running from 6pm-2pm with auto free water flushes of 25 mL/hr. This provides 1440 kcal, 72 g protein, 1466 mL total free water (966 mL free water from formula)  UBW- 62.2 kg from NH vs CBW- 31.7 kg ??? accuracy - 12/13 31.7 kg vs 12/11 62.1 kg- will utilize 12/11 weight.   Per Anisa MARTINES, weights stable;   7/24/24- 59.9 kg  8/1/24- 61.7 kg  10/9/24- 60 kg  10/29/24- 61.7 kg  11/1/24- 66.4 kg   11/12/24- 66.4 kg

## 2024-12-14 NOTE — DIETITIAN INITIAL EVALUATION ADULT - ADD RECOMMEND
Nutrition Intervention: Enteral Nutrition, Coordination of Care  RD to monitor EN regimen/tolerance, GI function, Nutrition Labs, Electrolytes, NFPF, Weights    RD to follow at high nutrition risk.

## 2024-12-14 NOTE — DIETITIAN INITIAL EVALUATION ADULT - ENTERAL
Adjust TF regimen: Glucerna 1.2 @ 375 mL Q6H + 50 mL free water flushes pre/post feeds  Provides 1500 mL TV, 1800 kcal, 90 g protein, 1608 mL free water (1208 mL free water from formula)   Meets 97% estimated energy needs and 97% estimated protein needs.   ADJUST FREE WATER FLUSHES PRN PER MEDICAL TEAM  Adjust TF regimen: Glucerna 1.2 @ 375 mL Q6H + 50 mL free water flushes pre/post feeds + banatrol 3x daily as needed  Provides 1500 mL TV, 1800 kcal, 90 g protein, 1608 mL free water (1208 mL free water from formula)   Meets 97% estimated energy needs and 97% estimated protein needs.   ADJUST FREE WATER FLUSHES PRN PER MEDICAL TEAM

## 2024-12-15 LAB
ANION GAP SERPL CALC-SCNC: 12 MMOL/L — SIGNIFICANT CHANGE UP (ref 7–14)
BASOPHILS # BLD AUTO: 0.14 K/UL — SIGNIFICANT CHANGE UP (ref 0–0.2)
BASOPHILS NFR BLD AUTO: 1.2 % — HIGH (ref 0–1)
BUN SERPL-MCNC: 43 MG/DL — HIGH (ref 10–20)
CALCIUM SERPL-MCNC: 9 MG/DL — SIGNIFICANT CHANGE UP (ref 8.4–10.5)
CHLORIDE SERPL-SCNC: 110 MMOL/L — SIGNIFICANT CHANGE UP (ref 98–110)
CO2 SERPL-SCNC: 18 MMOL/L — SIGNIFICANT CHANGE UP (ref 17–32)
CREAT SERPL-MCNC: 1.1 MG/DL — SIGNIFICANT CHANGE UP (ref 0.7–1.5)
CULTURE RESULTS: SIGNIFICANT CHANGE UP
CULTURE RESULTS: SIGNIFICANT CHANGE UP
EGFR: 51 ML/MIN/1.73M2 — LOW
EOSINOPHIL # BLD AUTO: 0.27 K/UL — SIGNIFICANT CHANGE UP (ref 0–0.7)
EOSINOPHIL NFR BLD AUTO: 2.4 % — SIGNIFICANT CHANGE UP (ref 0–8)
GLUCOSE BLDC GLUCOMTR-MCNC: 180 MG/DL — HIGH (ref 70–99)
GLUCOSE BLDC GLUCOMTR-MCNC: 197 MG/DL — HIGH (ref 70–99)
GLUCOSE BLDC GLUCOMTR-MCNC: 211 MG/DL — HIGH (ref 70–99)
GLUCOSE BLDC GLUCOMTR-MCNC: 221 MG/DL — HIGH (ref 70–99)
GLUCOSE SERPL-MCNC: 209 MG/DL — HIGH (ref 70–99)
HCT VFR BLD CALC: 27.5 % — LOW (ref 37–47)
HGB BLD-MCNC: 8.7 G/DL — LOW (ref 12–16)
IMM GRANULOCYTES NFR BLD AUTO: 1 % — HIGH (ref 0.1–0.3)
LYMPHOCYTES # BLD AUTO: 1.39 K/UL — SIGNIFICANT CHANGE UP (ref 1.2–3.4)
LYMPHOCYTES # BLD AUTO: 12.3 % — LOW (ref 20.5–51.1)
MCHC RBC-ENTMCNC: 29.7 PG — SIGNIFICANT CHANGE UP (ref 27–31)
MCHC RBC-ENTMCNC: 31.6 G/DL — LOW (ref 32–37)
MCV RBC AUTO: 93.9 FL — SIGNIFICANT CHANGE UP (ref 81–99)
MONOCYTES # BLD AUTO: 1.08 K/UL — HIGH (ref 0.1–0.6)
MONOCYTES NFR BLD AUTO: 9.6 % — HIGH (ref 1.7–9.3)
NEUTROPHILS # BLD AUTO: 8.3 K/UL — HIGH (ref 1.4–6.5)
NEUTROPHILS NFR BLD AUTO: 73.5 % — SIGNIFICANT CHANGE UP (ref 42.2–75.2)
NRBC # BLD: 0 /100 WBCS — SIGNIFICANT CHANGE UP (ref 0–0)
PLATELET # BLD AUTO: 346 K/UL — SIGNIFICANT CHANGE UP (ref 130–400)
PMV BLD: 11.4 FL — HIGH (ref 7.4–10.4)
POTASSIUM SERPL-MCNC: 4.5 MMOL/L — SIGNIFICANT CHANGE UP (ref 3.5–5)
POTASSIUM SERPL-SCNC: 4.5 MMOL/L — SIGNIFICANT CHANGE UP (ref 3.5–5)
RBC # BLD: 2.93 M/UL — LOW (ref 4.2–5.4)
RBC # FLD: 16.1 % — HIGH (ref 11.5–14.5)
SODIUM SERPL-SCNC: 140 MMOL/L — SIGNIFICANT CHANGE UP (ref 135–146)
SPECIMEN SOURCE: SIGNIFICANT CHANGE UP
SPECIMEN SOURCE: SIGNIFICANT CHANGE UP
VANCOMYCIN TROUGH SERPL-MCNC: 29.5 UG/ML — HIGH (ref 5–10)
WBC # BLD: 11.29 K/UL — HIGH (ref 4.8–10.8)
WBC # FLD AUTO: 11.29 K/UL — HIGH (ref 4.8–10.8)

## 2024-12-15 RX ADMIN — METRONIDAZOLE 500 MILLIGRAM(S): 500 TABLET ORAL at 06:17

## 2024-12-15 RX ADMIN — LACOSAMIDE 140 MILLIGRAM(S): 10 INJECTION INTRAVENOUS at 06:09

## 2024-12-15 RX ADMIN — Medication 50 MILLIGRAM(S): at 06:17

## 2024-12-15 RX ADMIN — ENOXAPARIN SODIUM 30 MILLIGRAM(S): 30 INJECTION SUBCUTANEOUS at 08:26

## 2024-12-15 RX ADMIN — FAMOTIDINE 20 MILLIGRAM(S): 20 TABLET, FILM COATED ORAL at 11:32

## 2024-12-15 RX ADMIN — Medication 250 MILLIGRAM(S): at 11:33

## 2024-12-15 RX ADMIN — Medication 1 APPLICATION(S): at 17:31

## 2024-12-15 RX ADMIN — CHLORHEXIDINE GLUCONATE 1 APPLICATION(S): 1.2 RINSE ORAL at 06:25

## 2024-12-15 RX ADMIN — Medication 50 MILLIGRAM(S): at 17:32

## 2024-12-15 RX ADMIN — LABETALOL 200 MILLIGRAM(S): 100 TABLET, FILM COATED ORAL at 22:10

## 2024-12-15 RX ADMIN — LACOSAMIDE 140 MILLIGRAM(S): 10 INJECTION INTRAVENOUS at 17:47

## 2024-12-15 RX ADMIN — CEFEPIME 100 MILLIGRAM(S): 2 INJECTION, POWDER, FOR SOLUTION INTRAVENOUS at 17:32

## 2024-12-15 RX ADMIN — Medication 1 APPLICATION(S): at 06:18

## 2024-12-15 RX ADMIN — METRONIDAZOLE 500 MILLIGRAM(S): 500 TABLET ORAL at 13:32

## 2024-12-15 RX ADMIN — LABETALOL 200 MILLIGRAM(S): 100 TABLET, FILM COATED ORAL at 13:32

## 2024-12-15 RX ADMIN — INSULIN GLARGINE 5 UNIT(S): 100 INJECTION, SOLUTION SUBCUTANEOUS at 22:04

## 2024-12-15 RX ADMIN — Medication 4: at 08:25

## 2024-12-15 RX ADMIN — METRONIDAZOLE 500 MILLIGRAM(S): 500 TABLET ORAL at 22:10

## 2024-12-15 RX ADMIN — NYSTATIN 1 APPLICATION(S): 100000 POWDER TOPICAL at 06:18

## 2024-12-15 RX ADMIN — Medication 2: at 17:31

## 2024-12-15 RX ADMIN — LEVETIRACETAM 1500 MILLIGRAM(S): 1000 TABLET ORAL at 17:31

## 2024-12-15 RX ADMIN — Medication 1 APPLICATION(S): at 17:32

## 2024-12-15 RX ADMIN — NYSTATIN 1 APPLICATION(S): 100000 POWDER TOPICAL at 17:32

## 2024-12-15 RX ADMIN — Medication 4: at 11:31

## 2024-12-15 RX ADMIN — CEFEPIME 100 MILLIGRAM(S): 2 INJECTION, POWDER, FOR SOLUTION INTRAVENOUS at 06:17

## 2024-12-15 RX ADMIN — LABETALOL 200 MILLIGRAM(S): 100 TABLET, FILM COATED ORAL at 06:18

## 2024-12-15 RX ADMIN — AMLODIPINE BESYLATE 5 MILLIGRAM(S): 10 TABLET ORAL at 22:10

## 2024-12-15 RX ADMIN — Medication 100 MILLIGRAM(S): at 11:32

## 2024-12-15 RX ADMIN — LEVETIRACETAM 1500 MILLIGRAM(S): 1000 TABLET ORAL at 06:10

## 2024-12-15 NOTE — PROGRESS NOTE ADULT - ATTENDING COMMENTS
Patient was seen and examined today  Assessment and plan as above  I edited the note  Discussed with the housestaff

## 2024-12-15 NOTE — PROGRESS NOTE ADULT - ASSESSMENT
A 79-year-old woman with medical history including myelodysplastic syndrome (MDS) followed by Dr. Wade (requiring periodic transfusions for anemia), diabetes mellitus, hypertension, chronic kidney disease 2, and multiple recent admissions for a b/l subdural hematomas (SDH) with mass effect and midline shift (status-post evacuation and middle meningeal artery embolization complicated by a leaking craniotomy site and concern for status epilepticus) presented to the ED from her skilled nursing facility (SNF) due to lethargy and fever.  She is nonverbal, bedbound, and has a PEG tube.  She also has a recent history of *Enterococcus faecalis* urinary tract infection.    The SNF staff reported that the patient has been experiencing spiking fevers for 10 days PTA.  A chest x-ray obtained at the SNF demonstrated infiltrates, prompting initiation of vancomycin for suspected pneumonia.  Her white blood cell count was elevated at 18,000/µL.  Urinalysis and urine culture were negative, as were blood cultures.  The patient has persistent anemia despite receiving weekly darbepoetin zachery (Aranesp) injections three times a week, with the last dose administered on Monday 12/9.  Her most recent hemoglobin level was 5.8 g/dL, prompting the SNF to [facility] for [facility] transfusion.  However, due to concerns about the patient's current infectious picture, the transfusion was deferred, and the patient was sent to the ED for further evaluation. On presentation to the ED, the patient was lethargic. No nausea, vomiting, diarrhea, abdominal pain, chest pain, or sick contacts.     # This is a STAR patient - GOC done    # pt is immunocompromised 2/2 age, MDS, DM    # pt is non-verbal and has functional quadriplegia. Prior to arrival to ED on 10/8/24 for being found unresponsive on floor, pt was independent of all ADLs, lived alone, took care of herself well and had no dementia.    # Sepsis POA 2/2 PNA and PEG cellulitis; has diarrhea  remains tachy; afebrile  now off O2   WBC 15.63 -> 20.8 -> 25.6 -> 18.2 ->  UA positive for yeast-like cells, few bacteria, moderate leuk est and WBC  UCx: 10-49K candida albicans - ID said no tx  Pl Fl Cx: neg  BCx: neg  PEG site Cx (body fluid 12/11): neg so far - f/u  MRSA nares: neg  C Diff: neg  U Strp/Leg: never sent (OK)  GI PCR: never sent (OK)  V Dupl: neg DVT (dimer 1822)  CXR: decr rt side opac; poss incr lt side opac  CT C: Lg b/l pl eff w/ adj comp atelec w/ near complete collapse LLL and complete collapse RLL  CT A/P: mild edema of colon w/ mucosal hyperremia; no bowel obst; tr ascites; anasarca  ID noted  abx: c/w cefepime, vanc and flagyl   Bactroban oint top q12 to PEG site  Clotrimazole oint top q12 to PEG site  Please cleanse under PEG site with Vashe twice daily PRN for soiling and pat dry.  s/p thoracentesis (12/11) with removal of 1L serous fluid (transudative)  no laxatives - diarrhea improving  cbc q24    # stg 3 sacrococcygeal decub ulcer  wound care RN noted  can use rectal tube to divert stool away from ulcer - Patient will benefit from fecal incontinence  bag due to multiple episodes of liquid diarrhea    Cleanse wound with soap and water, pat dry. Apply Triad cream twice daily PRN for soiling to sacrococcygeal area, groin, buttock and thighs.  Skin and incontinence care.  Pressure Injury Prevention.    # MDS with normo Anemia  aranesp not helpful in past  sees Dr Wade (h/o)  tx dependent  s/p 2u pRBCs   keep hgb > 7.5  try to limit excessive labs    # Hypernatremia likely 2/2 to dec PO intake/dehydration:   Na: 153 -> 150 -> 147 ->   IVFs: 1/2 /hr - once Na better, try to get off IVFs and put ALL hydration into PEG  adj PEG feeds/fluids  Feeds: glucerna 360cc PEG q8  Flush: 100/100cc pre/post q8  Free Water: 400cc 2hrs after each feed q8  BMP 4pm and AM    # hyperkalemia - better    # hyperphosphatemia - mild  f/u in couple days    # Ca at upper limits nl  observe    # DM  glucerna feeds (above)  FS qac/hs - goal 100-180 - hyperglycemia  start lantus 5 HS  might need lisp w/ feeds or incr lantus  SSI w/ feeds q8 - adjust as needed    # Recent admission for b/l SDH w/ mass effect + midline shift s/p evacuation  s/p MMA embolization, c/b leaking from crani site and concern for status epilepticus   CTH :   1.  The right frontal convexity extra-axial collection has decreased in size and density.  2.  The left frontal convexity isodense subdural collection has not significantly changed.  c/w Keppra 1500 mg iv q12 - can convert to PEG w/ liquid  c/w Lacosamide 200 iv q12  - can convert to PEG w/ liquid or tablet  c/w amantadine 50mg PEG q12  c/w Vit B6 100mg PEG q24    # HTN - not controlled  Echo 10/9/24: EF 65 to 70%; mild LAE; mild TR; no pHTN  HOLD lisinopril 20mg q24  incr labetalol 200mg peg q8 - hold BP < 100/60, HR < 55 (usual dose 200mg peg q8)  restart norvasc @ 5mg peg q24 - hold if BP < 100/60 (usual dose 10mg peg q24)    # CKD 2; minor metab acidosis  (Cr 1.0 at baseline)  stable     # DVT ppx: LMWH 30mg sc q24    # GI ppx: pepcid 20 peg q12    # Activity: bedbound    # full code     Dispo: tx DM; tx BP: tx Na; f/u lytes; daily labs; f/u ID re abx; IVFs - convert to PEG water; PEG feeds; f/u diarrhea; PEG site care; sacral wound care;   eventually, pt will go back for STR at SNF ([facility]) - f/u CM - still acute    Prog is very guarded.   A 79-year-old woman with medical history including myelodysplastic syndrome (MDS) followed by Dr. Wade (requiring periodic transfusions for anemia), diabetes mellitus, hypertension, chronic kidney disease 2, and multiple recent admissions for a b/l subdural hematomas (SDH) with mass effect and midline shift (status-post evacuation and middle meningeal artery embolization complicated by a leaking craniotomy site and concern for status epilepticus) presented to the ED from her skilled nursing facility (SNF) due to lethargy and fever.  She is nonverbal, bedbound, and has a PEG tube.  She also has a recent history of *Enterococcus faecalis* urinary tract infection.    The SNF staff reported that the patient has been experiencing spiking fevers for 10 days PTA.  A chest x-ray obtained at the SNF demonstrated infiltrates, prompting initiation of vancomycin for suspected pneumonia.  Her white blood cell count was elevated at 18,000/µL.  Urinalysis and urine culture were negative, as were blood cultures.  The patient has persistent anemia despite receiving weekly darbepoetin zachery (Aranesp) injections three times a week, with the last dose administered on Monday 12/9.  Her most recent hemoglobin level was 5.8 g/dL, prompting the SNF to contact the [facility] for a possible transfusion.  However, due to concerns about the patient's current infectious picture, the transfusion was deferred, and the patient was sent to the ED for further evaluation. On presentation to the ED, the patient was lethargic. No nausea, vomiting, diarrhea, abdominal pain, chest pain, or sick contacts.     # This is a STAR patient - GOC done    # pt is immunocompromised 2/2 age, MDS, DM    # pt is non-verbal and has functional quadriplegia. Prior to arrival to ED on 10/8/24 for being found unresponsive on floor, pt was independent of all ADLs, lived alone, took care of herself well and had no dementia.    # Sepsis POA 2/2 PNA and PEG cellulitis; has diarrhea  remains tachy; afebrile  now off O2   WBC noted   UA positive for yeast-like cells, few bacteria, moderate leuk est and WBC  UCx: 10-49K candida albicans - ID said no tx  Pl Fl Cx: neg  BCx: neg  MRSA nares: neg  C Diff: neg  U Strp/Leg: never sent (OK)  GI PCR: never sent (OK)  V Dupl: neg DVT (dimer 1822)  CXR: decr rt side opac; poss incr lt side opac  CT C: Lg b/l pl eff w/ adj comp atelec w/ near complete collapse LLL and complete collapse RLL  CT A/P: mild edema of colon w/ mucosal hyperremia; no bowel obst; tr ascites; anasarca  ID noted  abx: c/w cefepime, vanc and flagyl   Bactroban oint top q12 to PEG site  Clotrimazole oint top q12 to PEG site  Please cleanse under PEG site with Vashe twice daily PRN for soiling and pat dry.  s/p thoracentesis (12/11) with removal of 1L serous fluid (transudative)  no laxatives - diarrhea improving  cbc q24    # stg 3 sacrococcygeal decub ulcer  wound care RN noted  can use rectal tube to divert stool away from ulcer - Patient will benefit from fecal incontinence  bag due to multiple episodes of liquid diarrhea    Cleanse wound with soap and water, pat dry. Apply Triad cream twice daily PRN for soiling to sacrococcygeal area, groin, buttock and thighs.  Skin and incontinence care.  Pressure Injury Prevention.    # MDS with normo Anemia  aranesp not helpful in past  sees Dr Wade (h/o)  tx dependent  s/p 2u pRBCs   keep hgb > 7.5  try to limit excessive labs    # Hypernatremia likely 2/2 to dec PO intake/dehydration:   Na: 153 -> 150 -> 147 ->   IVFs: 1/2 /hr - once Na better, try to get off IVFs and put ALL hydration into PEG  adj PEG feeds/fluids  Feeds: glucerna 360cc PEG q8  Flush: 100/100cc pre/post q8  Free Water: 400cc 2hrs after each feed q8  BMP 4pm and AM    # hyperkalemia - better    # hyperphosphatemia - mild  f/u in couple days    # Ca at upper limits nl  observe    # DM  glucerna feeds (above)  FS qac/hs - goal 100-180 - hyperglycemia  start lantus 5 HS  might need lisp w/ feeds or incr lantus  SSI w/ feeds q8 - adjust as needed    # Recent admission for b/l SDH w/ mass effect + midline shift s/p evacuation  s/p MMA embolization, c/b leaking from crani site and concern for status epilepticus   CTH :   1.  The right frontal convexity extra-axial collection has decreased in size and density.  2.  The left frontal convexity isodense subdural collection has not significantly changed.  c/w Keppra 1500 mg iv q12 - can convert to PEG w/ liquid  c/w Lacosamide 200 iv q12  - can convert to PEG w/ liquid or tablet  c/w amantadine 50mg PEG q12  c/w Vit B6 100mg PEG q24    # HTN - better   Echo 10/9/24: EF 65 to 70%; mild LAE; mild TR; no pHTN  HOLD lisinopril 20mg q24  incr labetalol 200mg peg q8 - hold BP < 100/60, HR < 55 (usual dose 200mg peg q8)  restart norvasc @ 5mg peg q24 - hold if BP < 100/60 (usual dose 10mg peg q24)    # CKD 2; minor metab acidosis  (Cr 1.0 at baseline)  stable     # DVT ppx: LMWH 30mg sc q24    # GI ppx: pepcid 20 peg q12    # Activity: bedbound    # full code     Dispo: tx DM; tx BP: tx Na; f/u lytes; daily labs; f/u ID re abx; IVFs - convert to PEG water; PEG feeds; f/u diarrhea; PEG site care; sacral wound care;   eventually, pt will go back for STR at SNF ([facility]) - f/u CM - still acute    Prog is very guarded.

## 2024-12-15 NOTE — PROGRESS NOTE ADULT - SUBJECTIVE AND OBJECTIVE BOX
SUBJECTIVE/OVERNIGHT EVENTS  Today is hospital day 5d. This morning patient was seen and examined at bedside.    HPI:  A 79-year-old female with a complex medical history including myelodysplastic syndrome (MDS) followed by Dr. Wade (requiring periodic transfusions for anemia), diabetes mellitus, hypertension, chronic kidney disease, and multiple recent admissions for a right-sided subdural hematoma (SDH) with mass effect and midline shift (status-post evacuation and middle meningeal artery embolization complicated by a leaking craniotomy site and concern for status epilepticus) presented to the ED from her skilled nursing facility (SNF) due to lethargy and fever.  She is nonverbal, bedbound, and has a PEG tube.  She also has a recent history of *Enterococcus faecalis* urinary tract infection.    The SNF staff reported that the patient has been experiencing spiking fevers for the past 10 days.  A chest x-ray obtained at the SNF demonstrated infiltrates, prompting initiation of vancomycin for suspected pneumonia.  Her white blood cell count was elevated at 18,000/µL.  Urinalysis and urine culture were negative, as were blood cultures.  The patient has persistent anemia despite receiving weekly darbepoetin zachery (Aranesp) injections three times a week, with the last dose administered on Monday.  Her most recent hemoglobin level was 5.8 g/dL, prompting the SNF to contact the [facility] for a possible transfusion.  However, due to concerns about the patient's current infectious picture, the transfusion was deferred, and the patient was sent to the ED for further evaluation.  On presentation to the ED, the patient was lethargic.  Daughter on bedside also confirmed Diarrhea   denied nausea, vomiting, diarrhea, abdominal pain, chest pain, or sick contacts.    Physical examination revealed multiple potential sources of infection, including a right upper extremity midline, a right internal jugular port-a-cath, and a stage 3 sacral decubitus ulcer.    In Ed on vitals :  BP Systolic 164/67 mm Hg   Heart Rate	 105 /min , afebrile on 3L of NC     On labs WBC 15 .63 , Hb 5.2, eosinophil of 1.28, NA of 153 , UA positive e yeast     CXray showed B/L opacities and effusions     CTAP 1.  Large bilateral pleural effusions with adjacent compressive   atelectasis resulting in near complete left and complete right lower lobe   collapse.  2.  Mild edematous thickening of the colon with mucosal hyperemia can be   seen with colitis of infectious or inflammatory etiology. No bowel   obstruction.  3.  Trace ascites. Anasarca.    CTH : 1.  The right frontal convexity extra-axial collection has decreased in   size and density.    2.  The left frontal convexity isodense subdural collection has not   significantly changed.    In ed rcvd cefe , flagyl and vanco, 2l of LR      Pending : 2 packs of red blood cells has been ordered     admitted to SDU for further workup .      (10 Dec 2024 18:28)      VITALS  T(F): 98.4 (12-15-24 @ 04:36), Max: 98.4 (12-15-24 @ 04:36)  HR: 88 (12-15-24 @ 04:36) (78 - 88)  BP: 133/52 (12-15-24 @ 04:36) (133/52 - 145/70)  RR: 18 (12-15-24 @ 04:36) (18 - 18)  SpO2: 93% (12-15-24 @ 04:36) (93% - 95%)  POCT Blood Glucose.: 159 mg/dL (12-14-24 @ 21:14)  POCT Blood Glucose.: 124 mg/dL (12-14-24 @ 16:52)  POCT Blood Glucose.: 178 mg/dL (12-14-24 @ 11:13)  POCT Blood Glucose.: 189 mg/dL (12-14-24 @ 08:05)          PHYSICAL EXAM      GENERAL: Obtunded  HEAD:  Midline scalp scar   CHEST/LUNG: b/l ronchi, abundant URT secretions  HEART: Regular rate and rhythm; No murmurs, rubs, or gallops  ABDOMEN: Soft, nondistended; PEG tube in place with surrounding erythema and draining pus  EXTREMITIES:  Mild LE edema +1  PSYCH: obtunded  NEUROLOGY: Not awake or alert., no response to commands  SKIN: Normal color, No rashes or lesions        MEDICATIONS  STANDING MEDICATIONS  amantadine Syrup 50 milliGRAM(s) Oral two times a day  amLODIPine   Tablet 5 milliGRAM(s) Oral at bedtime  cefepime   IVPB 1000 milliGRAM(s) IV Intermittent every 12 hours  chlorhexidine 2% Cloths 1 Application(s) Topical <User Schedule>  dextrose 5%. 1000 milliLiter(s) IV Continuous <Continuous>  dextrose 5%. 1000 milliLiter(s) IV Continuous <Continuous>  dextrose 50% Injectable 25 Gram(s) IV Push once  dextrose 50% Injectable 25 Gram(s) IV Push once  dextrose 50% Injectable 12.5 Gram(s) IV Push once  enoxaparin Injectable 30 milliGRAM(s) SubCutaneous every 24 hours  famotidine   Suspension 20 milliGRAM(s) Oral daily  glucagon  Injectable 1 milliGRAM(s) IntraMuscular once  insulin glargine Injectable (LANTUS) 5 Unit(s) SubCutaneous at bedtime  insulin lispro (ADMELOG) corrective regimen sliding scale   SubCutaneous three times a day before meals  labetalol 200 milliGRAM(s) Oral every 8 hours  lacosamide IVPB 200 milliGRAM(s) IV Intermittent every 12 hours  levETIRAcetam   Injectable 1500 milliGRAM(s) IV Push every 12 hours  metroNIDAZOLE    Tablet 500 milliGRAM(s) Oral three times a day  mupirocin 2% Ointment 1 Application(s) Topical every 12 hours  nystatin Ointment 1 Application(s) Topical every 12 hours  pyridoxine 100 milliGRAM(s) Oral daily  vancomycin  IVPB 1000 milliGRAM(s) IV Intermittent every 24 hours  vitamin A & D Ointment 1 Application(s) Topical two times a day    PRN MEDICATIONS  dextrose Oral Gel 15 Gram(s) Oral once PRN        PAST MEDICAL & SURGICAL HISTORY:  DM (diabetes mellitus)      MDS (myelodysplastic syndrome)      SDH (subdural hematoma)      Seizure disorder      Chronic kidney disease (CKD)      H/O colonoscopy      S/P percutaneous endoscopic gastrostomy (PEG) tube placement      S/P craniotomy          LABS             8.3    13.29 )-----------( 336      ( 12-14-24 @ 06:25 )             26.1     142  |  112  |  40  -------------------------<  103   12-14-24 @ 06:25  4.3  |  19  |  0.9    Ca      9.1     12-14-24 @ 06:25          Urinalysis Basic - ( 14 Dec 2024 06:25 )    Color: x / Appearance: x / SG: x / pH: x  Gluc: 103 mg/dL / Ketone: x  / Bili: x / Urobili: x   Blood: x / Protein: x / Nitrite: x   Leuk Esterase: x / RBC: x / WBC x   Sq Epi: x / Non Sq Epi: x / Bacteria: x          IMAGING SUBJECTIVE/OVERNIGHT EVENTS  Today is hospital day 5d. This morning patient was seen and examined at bedside.    HPI:  A 79-year-old female with a complex medical history including myelodysplastic syndrome (MDS) followed by Dr. Wade (requiring periodic transfusions for anemia), diabetes mellitus, hypertension, chronic kidney disease, and multiple recent admissions for a right-sided subdural hematoma (SDH) with mass effect and midline shift (status-post evacuation and middle meningeal artery embolization complicated by a leaking craniotomy site and concern for status epilepticus) presented to the ED from her skilled nursing facility (SNF) due to lethargy and fever.  She is nonverbal, bedbound, and has a PEG tube.  She also has a recent history of *Enterococcus faecalis* urinary tract infection.       VITALS  T(F): 98.4 (12-15-24 @ 04:36), Max: 98.4 (12-15-24 @ 04:36)  HR: 88 (12-15-24 @ 04:36) (78 - 88)  BP: 133/52 (12-15-24 @ 04:36) (133/52 - 145/70)  RR: 18 (12-15-24 @ 04:36) (18 - 18)  SpO2: 93% (12-15-24 @ 04:36) (93% - 95%)  POCT Blood Glucose.: 159 mg/dL (12-14-24 @ 21:14)  POCT Blood Glucose.: 124 mg/dL (12-14-24 @ 16:52)  POCT Blood Glucose.: 178 mg/dL (12-14-24 @ 11:13)  POCT Blood Glucose.: 189 mg/dL (12-14-24 @ 08:05)          PHYSICAL EXAM      GENERAL: Obtunded  HEAD:  Midline scalp scar   CHEST/LUNG: b/l ronchi, abundant URT secretions  HEART: Regular rate and rhythm; No murmurs, rubs, or gallops  ABDOMEN: Soft, nondistended; PEG tube in place with surrounding erythema and draining pus  EXTREMITIES:  Mild LE edema +1  PSYCH: obtunded  NEUROLOGY: Not awake or alert., no response to commands  SKIN: Normal color, No rashes or lesions        MEDICATIONS  STANDING MEDICATIONS  amantadine Syrup 50 milliGRAM(s) Oral two times a day  amLODIPine   Tablet 5 milliGRAM(s) Oral at bedtime  cefepime   IVPB 1000 milliGRAM(s) IV Intermittent every 12 hours  chlorhexidine 2% Cloths 1 Application(s) Topical <User Schedule>  dextrose 5%. 1000 milliLiter(s) IV Continuous <Continuous>  dextrose 5%. 1000 milliLiter(s) IV Continuous <Continuous>  dextrose 50% Injectable 25 Gram(s) IV Push once  dextrose 50% Injectable 25 Gram(s) IV Push once  dextrose 50% Injectable 12.5 Gram(s) IV Push once  enoxaparin Injectable 30 milliGRAM(s) SubCutaneous every 24 hours  famotidine   Suspension 20 milliGRAM(s) Oral daily  glucagon  Injectable 1 milliGRAM(s) IntraMuscular once  insulin glargine Injectable (LANTUS) 5 Unit(s) SubCutaneous at bedtime  insulin lispro (ADMELOG) corrective regimen sliding scale   SubCutaneous three times a day before meals  labetalol 200 milliGRAM(s) Oral every 8 hours  lacosamide IVPB 200 milliGRAM(s) IV Intermittent every 12 hours  levETIRAcetam   Injectable 1500 milliGRAM(s) IV Push every 12 hours  metroNIDAZOLE    Tablet 500 milliGRAM(s) Oral three times a day  mupirocin 2% Ointment 1 Application(s) Topical every 12 hours  nystatin Ointment 1 Application(s) Topical every 12 hours  pyridoxine 100 milliGRAM(s) Oral daily  vancomycin  IVPB 1000 milliGRAM(s) IV Intermittent every 24 hours  vitamin A & D Ointment 1 Application(s) Topical two times a day    PRN MEDICATIONS  dextrose Oral Gel 15 Gram(s) Oral once PRN        PAST MEDICAL & SURGICAL HISTORY:  DM (diabetes mellitus)      MDS (myelodysplastic syndrome)      SDH (subdural hematoma)      Seizure disorder      Chronic kidney disease (CKD)      H/O colonoscopy      S/P percutaneous endoscopic gastrostomy (PEG) tube placement      S/P craniotomy          LABS             8.3    13.29 )-----------( 336      ( 12-14-24 @ 06:25 )             26.1     142  |  112  |  40  -------------------------<  103   12-14-24 @ 06:25  4.3  |  19  |  0.9    Ca      9.1     12-14-24 @ 06:25          Urinalysis Basic - ( 14 Dec 2024 06:25 )    Color: x / Appearance: x / SG: x / pH: x  Gluc: 103 mg/dL / Ketone: x  / Bili: x / Urobili: x   Blood: x / Protein: x / Nitrite: x   Leuk Esterase: x / RBC: x / WBC x   Sq Epi: x / Non Sq Epi: x / Bacteria: x          IMAGING

## 2024-12-16 ENCOUNTER — TRANSCRIPTION ENCOUNTER (OUTPATIENT)
Age: 79
End: 2024-12-16

## 2024-12-16 VITALS
OXYGEN SATURATION: 93 % | SYSTOLIC BLOOD PRESSURE: 116 MMHG | RESPIRATION RATE: 18 BRPM | DIASTOLIC BLOOD PRESSURE: 51 MMHG | HEART RATE: 77 BPM | TEMPERATURE: 98 F

## 2024-12-16 LAB
ANION GAP SERPL CALC-SCNC: 11 MMOL/L — SIGNIFICANT CHANGE UP (ref 7–14)
BASOPHILS # BLD AUTO: 0.15 K/UL — SIGNIFICANT CHANGE UP (ref 0–0.2)
BASOPHILS NFR BLD AUTO: 1.2 % — HIGH (ref 0–1)
BUN SERPL-MCNC: 48 MG/DL — HIGH (ref 10–20)
CALCIUM SERPL-MCNC: 8.9 MG/DL — SIGNIFICANT CHANGE UP (ref 8.4–10.4)
CHLORIDE SERPL-SCNC: 111 MMOL/L — HIGH (ref 98–110)
CO2 SERPL-SCNC: 20 MMOL/L — SIGNIFICANT CHANGE UP (ref 17–32)
CREAT SERPL-MCNC: 1.1 MG/DL — SIGNIFICANT CHANGE UP (ref 0.7–1.5)
CULTURE RESULTS: SIGNIFICANT CHANGE UP
EGFR: 51 ML/MIN/1.73M2 — LOW
EOSINOPHIL # BLD AUTO: 0.49 K/UL — SIGNIFICANT CHANGE UP (ref 0–0.7)
EOSINOPHIL NFR BLD AUTO: 3.9 % — SIGNIFICANT CHANGE UP (ref 0–8)
GLUCOSE BLDC GLUCOMTR-MCNC: 207 MG/DL — HIGH (ref 70–99)
GLUCOSE BLDC GLUCOMTR-MCNC: 238 MG/DL — HIGH (ref 70–99)
GLUCOSE SERPL-MCNC: 187 MG/DL — HIGH (ref 70–99)
HCT VFR BLD CALC: 25.6 % — LOW (ref 37–47)
HGB BLD-MCNC: 8.3 G/DL — LOW (ref 12–16)
IMM GRANULOCYTES NFR BLD AUTO: 1 % — HIGH (ref 0.1–0.3)
LYMPHOCYTES # BLD AUTO: 1.83 K/UL — SIGNIFICANT CHANGE UP (ref 1.2–3.4)
LYMPHOCYTES # BLD AUTO: 14.6 % — LOW (ref 20.5–51.1)
MCHC RBC-ENTMCNC: 30.2 PG — SIGNIFICANT CHANGE UP (ref 27–31)
MCHC RBC-ENTMCNC: 32.4 G/DL — SIGNIFICANT CHANGE UP (ref 32–37)
MCV RBC AUTO: 93.1 FL — SIGNIFICANT CHANGE UP (ref 81–99)
MONOCYTES # BLD AUTO: 1.42 K/UL — HIGH (ref 0.1–0.6)
MONOCYTES NFR BLD AUTO: 11.4 % — HIGH (ref 1.7–9.3)
NEUTROPHILS # BLD AUTO: 8.49 K/UL — HIGH (ref 1.4–6.5)
NEUTROPHILS NFR BLD AUTO: 67.9 % — SIGNIFICANT CHANGE UP (ref 42.2–75.2)
NRBC # BLD: 0 /100 WBCS — SIGNIFICANT CHANGE UP (ref 0–0)
PLATELET # BLD AUTO: 388 K/UL — SIGNIFICANT CHANGE UP (ref 130–400)
PMV BLD: 11.1 FL — HIGH (ref 7.4–10.4)
POTASSIUM SERPL-MCNC: 4.7 MMOL/L — SIGNIFICANT CHANGE UP (ref 3.5–5)
POTASSIUM SERPL-SCNC: 4.7 MMOL/L — SIGNIFICANT CHANGE UP (ref 3.5–5)
RBC # BLD: 2.75 M/UL — LOW (ref 4.2–5.4)
RBC # FLD: 16.2 % — HIGH (ref 11.5–14.5)
SODIUM SERPL-SCNC: 142 MMOL/L — SIGNIFICANT CHANGE UP (ref 135–146)
SPECIMEN SOURCE: SIGNIFICANT CHANGE UP
WBC # BLD: 12.5 K/UL — HIGH (ref 4.8–10.8)
WBC # FLD AUTO: 12.5 K/UL — HIGH (ref 4.8–10.8)

## 2024-12-16 PROCEDURE — 99239 HOSP IP/OBS DSCHRG MGMT >30: CPT

## 2024-12-16 RX ORDER — LACOSAMIDE 10 MG/ML
20 INJECTION INTRAVENOUS
Qty: 0 | Refills: 0 | DISCHARGE
Start: 2024-12-16

## 2024-12-16 RX ORDER — NYSTATIN 100000 [USP'U]/G
1 POWDER TOPICAL
Qty: 0 | Refills: 0 | DISCHARGE
Start: 2024-12-16

## 2024-12-16 RX ORDER — INSULIN GLARGINE 100 [IU]/ML
5 INJECTION, SOLUTION SUBCUTANEOUS
Qty: 0 | Refills: 0 | DISCHARGE
Start: 2024-12-16

## 2024-12-16 RX ORDER — AMLODIPINE BESYLATE 10 MG/1
1 TABLET ORAL
Qty: 0 | Refills: 0 | DISCHARGE
Start: 2024-12-16

## 2024-12-16 RX ORDER — VITAMIN A
1 CREAM (GRAM) TOPICAL
Qty: 0 | Refills: 0 | DISCHARGE
Start: 2024-12-16

## 2024-12-16 RX ORDER — CEFPODOXIME PROXETIL 100 MG/5ML
1 GRANULE, FOR SUSPENSION ORAL
Qty: 2 | Refills: 0
Start: 2024-12-16 | End: 2024-12-16

## 2024-12-16 RX ORDER — FAMOTIDINE 20 MG/1
2.5 TABLET, FILM COATED ORAL
Qty: 0 | Refills: 0 | DISCHARGE
Start: 2024-12-16

## 2024-12-16 RX ORDER — METRONIDAZOLE 500 MG/1
1 TABLET ORAL
Qty: 0 | Refills: 0 | DISCHARGE
Start: 2024-12-16

## 2024-12-16 RX ORDER — LISINOPRIL 20 MG/1
1 TABLET ORAL
Qty: 30 | Refills: 0
Start: 2024-12-16 | End: 2025-01-14

## 2024-12-16 RX ORDER — LEVETIRACETAM 1000 MG/1
15 TABLET ORAL
Qty: 900 | Refills: 0
Start: 2024-12-16 | End: 2025-01-14

## 2024-12-16 RX ADMIN — Medication 50 MILLIGRAM(S): at 05:49

## 2024-12-16 RX ADMIN — Medication 1 APPLICATION(S): at 05:47

## 2024-12-16 RX ADMIN — LABETALOL 200 MILLIGRAM(S): 100 TABLET, FILM COATED ORAL at 13:13

## 2024-12-16 RX ADMIN — FAMOTIDINE 20 MILLIGRAM(S): 20 TABLET, FILM COATED ORAL at 12:13

## 2024-12-16 RX ADMIN — Medication 4: at 08:06

## 2024-12-16 RX ADMIN — Medication 4: at 12:12

## 2024-12-16 RX ADMIN — LABETALOL 200 MILLIGRAM(S): 100 TABLET, FILM COATED ORAL at 05:48

## 2024-12-16 RX ADMIN — CEFEPIME 100 MILLIGRAM(S): 2 INJECTION, POWDER, FOR SOLUTION INTRAVENOUS at 05:47

## 2024-12-16 RX ADMIN — METRONIDAZOLE 500 MILLIGRAM(S): 500 TABLET ORAL at 13:12

## 2024-12-16 RX ADMIN — LEVETIRACETAM 1500 MILLIGRAM(S): 1000 TABLET ORAL at 05:46

## 2024-12-16 RX ADMIN — Medication 250 MILLIGRAM(S): at 04:44

## 2024-12-16 RX ADMIN — METRONIDAZOLE 500 MILLIGRAM(S): 500 TABLET ORAL at 05:48

## 2024-12-16 RX ADMIN — Medication 100 MILLIGRAM(S): at 12:21

## 2024-12-16 RX ADMIN — NYSTATIN 1 APPLICATION(S): 100000 POWDER TOPICAL at 05:47

## 2024-12-16 RX ADMIN — CHLORHEXIDINE GLUCONATE 1 APPLICATION(S): 1.2 RINSE ORAL at 05:49

## 2024-12-16 RX ADMIN — Medication 1 APPLICATION(S): at 05:48

## 2024-12-16 RX ADMIN — LACOSAMIDE 140 MILLIGRAM(S): 10 INJECTION INTRAVENOUS at 06:42

## 2024-12-16 RX ADMIN — ENOXAPARIN SODIUM 30 MILLIGRAM(S): 30 INJECTION SUBCUTANEOUS at 08:31

## 2024-12-16 NOTE — DISCHARGE NOTE NURSING/CASE MANAGEMENT/SOCIAL WORK - FINANCIAL ASSISTANCE
[facility] provides services at a reduced cost to those who are determined to be eligible through [facility]’s financial assistance program. Information regarding [facility]’s financial assistance program can be found by going to [email or web address] or by calling [phone].

## 2024-12-16 NOTE — PROGRESS NOTE ADULT - ASSESSMENT
A 79-year-old woman with medical history including myelodysplastic syndrome (MDS) followed by Dr. Wade (requiring periodic transfusions for anemia), diabetes mellitus, hypertension, chronic kidney disease 2, and multiple recent admissions for a b/l subdural hematomas (SDH) with mass effect and midline shift (status-post evacuation and middle meningeal artery embolization complicated by a leaking craniotomy site and concern for status epilepticus) presented to the ED from her skilled nursing facility (SNF) due to lethargy and fever.  She is nonverbal, bedbound, and has a PEG tube.  She also has a recent history of *Enterococcus faecalis* urinary tract infection.    The SNF staff reported that the patient has been experiencing spiking fevers for 10 days PTA.  A chest x-ray obtained at the SNF demonstrated infiltrates, prompting initiation of vancomycin for suspected pneumonia.  Her white blood cell count was elevated at 18,000/µL.  Urinalysis and urine culture were negative, as were blood cultures.  The patient has persistent anemia despite receiving weekly darbepoetin zachery (Aranesp) injections three times a week, with the last dose administered on Monday 12/9.  Her most recent hemoglobin level was 5.8 g/dL, prompting the SNF to contact the [facility] for a possible transfusion.  However, due to concerns about the patient's current infectious picture, the transfusion was deferred, and the patient was sent to the ED for further evaluation. On presentation to the ED, the patient was lethargic. No nausea, vomiting, diarrhea, abdominal pain, chest pain, or sick contacts.     # This is a STAR patient - GOC done    # pt is immunocompromised 2/2 age, MDS, DM    # pt is non-verbal and has functional quadriplegia. Prior to arrival to ED on 10/8/24 for being found unresponsive on floor, pt was independent of all ADLs, lived alone, took care of herself well and had no dementia.    # Sepsis POA 2/2 PNA and PEG cellulitis; had diarrhea (better - has pasty stool 1-2x/day per RN)  HR better; afebrile  now off O2   WBC 15.63 -> 20.8 -> 25.6 -> 18.2 -> -> 12.5  UA positive for yeast-like cells, few bacteria, moderate leuk est and WBC  UCx: 10-49K candida albicans - ID said no tx  Pl Fl Cx: neg  BCx: neg  PEG site Cx (body fluid 12/11): neg  MRSA nares: neg  C Diff: neg  U Strp/Leg: never sent (OK)  GI PCR: never sent (OK)  V Dupl: neg DVT (dimer 1822)  CXR: decr rt side opac; poss incr lt side opac  CT C: 12/10/24 Lg b/l pl eff w/ adj comp atelec w/ near complete collapse LLL and complete collapse RLL  CT A/P: mild edema of colon w/ mucosal hyperremia; no bowel obst; tr ascites; anasarca  CTA C 12/12/24: no PE; mild rt hrt strain; poss pHTN; mod rt pl eff (better w/ better expansion RLL); lg lt pl eff; persist consolid opac RLL and RUL; no PTX  s/p Rt thoracentesis (12/11) with removal of 1L serous fluid (transudative)  ID noted  abx: d/c vanco (MRSA nares neg) c/w cefepime and flagyl -> upon d/c, vantin 200mg peg q12 + flagyl 500mg po q8 thru 12/17 (7 days abx)  can d/c midline prior to d/c  Bactroban oint top q12 to PEG site for 2 wks total  nystatin oint top q12 to PEG site for 2 wks total  Please cleanse under PEG site with Vashe twice daily PRN for soiling and pat dry - cont forever  no laxatives - diarrhea improving    # stg 3 sacrococcygeal decub ulcer  wound care RN noted  can use rectal tube to divert stool away from ulcer - Patient will benefit from fecal incontinence  bag due to multiple episodes of liquid diarrhea    Cleanse wound with soap and water, pat dry. Apply Triad cream twice daily PRN for soiling to sacrococcygeal area, groin, buttock and thighs.  Skin and incontinence care.  Pressure Injury Prevention.    # MDS with normo Anemia  aranesp not helpful in past  sees Dr Wade (h/o)  tx dependent  s/p 2u pRBCs   keep hgb > 7.5  try to limit excessive labs    # Hypernatremia likely 2/2 to dec PO intake/dehydration:   Na: 153 -> 150 -> 147 -> -> 142  IVFs: off  adj PEG feeds/fluids  Feeds: glucerna 360cc PEG q6  Flush: 50/50cc pre/post q6  Free Water: 300cc 2hrs after each feed q6  f/u BMP at NH    # hyperkalemia - better    # hyperphosphatemia - mild  f/u at NH    # Ca at upper limits nl  observe - stable    # DM  glucerna feeds (above)  FS qac/hs - goal 100-180 - hyperglycemia  lantus 5 HS  - will likely need incr at NH  might need lisp w/ feeds - can determine at NH  SSI w/ feeds q8 - adjust as needed    # Recent admission for b/l SDH w/ mass effect + midline shift s/p evacuation  s/p MMA embolization, c/b leaking from crani site and concern for status epilepticus   CTH :   1.  The right frontal convexity extra-axial collection has decreased in size and density.  2.  The left frontal convexity isodense subdural collection has not significantly changed.  c/w Keppra 1500 mg iv q12 - convert to PEG w/ liquid  c/w Lacosamide 200 iv q12  - convert to PEG w/ liquid or tablet  c/w amantadine 50mg PEG q12  c/w Vit B6 100mg PEG q24    # HTN - not controlled  Echo 10/9/24: EF 65 to 70%; mild LAE; mild TR; no pHTN  restart lisinopril @ 10mg peg q24 (usual dose 20mg q24)  c/w labetalol 200mg peg q8 - hold BP < 100/60, HR < 55   c/w norvasc @ 5mg peg q24 - hold if BP < 100/60 (usual dose 10mg peg q24)  adj BP meds at NH as needed    # CKD 2; minor metab acidosis  (Cr 1.0 at baseline)  stable     # DVT ppx: LMWH 30mg sc q24    # GI ppx: pepcid 20 peg q12    # Activity: bedbound    # full code     Dispo: tx DM; tx BP: tx Na; f/u lytes; f/u ID re abx; PEG water; PEG feeds; make iv meds via PEG; f/u diarrhea; PEG site care; sacral wound care;   today, pt will go back for STR at [facility] - f/u CM - stable    Prog is very guarded.

## 2024-12-16 NOTE — PROGRESS NOTE ADULT - PROVIDER SPECIALTY LIST ADULT
Hospitalist
Internal Medicine
Pulmonology
Pulmonology
Gastroenterology
Gastroenterology
Infectious Disease
Internal Medicine
Internal Medicine
Infectious Disease

## 2024-12-16 NOTE — PROGRESS NOTE ADULT - TIME BILLING
d/c plan
I have personally seen and examined this patient. I have reviewed all pertinent clinical information and reviewed all relevant imaging and diagnostic studies personally.  I counseled the patient about diagnostic and treatment plan.  I discussed my recommendations with the primary team    >=1 chronic illness with severe exarcerbation, progression, treatment side effects.  -The doses of the antimicrobials will be adjusted according to the estimated creatinine clearence ( using the Cockroft-Gault equation ).  -I independently interpreted the most recent microbiological data. I analyzed the culture report and interpreted the MICs. I used my expertise in Infectious Diseases to tailor the antimicrobials.
1st day w/ pt    very complex chart review    complex care plan    cond a threat to life
Coordination of care
Coordination of care
charting, resident teaching rounds, and interdisciplinary planning

## 2024-12-16 NOTE — DISCHARGE NOTE PROVIDER - CARE PROVIDER_API CALL
David Torres  Internal Medicine  [address] [place] [address]  Phone: [phone]  Fax: [phone]  Follow Up Time: 1 week

## 2024-12-16 NOTE — PROGRESS NOTE ADULT - SUBJECTIVE AND OBJECTIVE BOX
SIMONA KUHN  79y  Female  ***My note supersedes ALL resident notes that I sign.  My corrections for their notes are in my note.***    I can be reached directly via Teams or my office number is [phone] or [phone].    INTERVAL EVENTS: Here for f/u of PNA and PEG cellulitis. Both better. Pt is stable - still not awake or verbal. She is passing TOV.    T(F): 98 (12-16-24 @ 15:09), Max: 98 (12-16-24 @ 04:49)  HR: 77 (12-16-24 @ 15:09) (77 - 85)  BP: 116/51 (12-16-24 @ 15:09) (116/51 - 145/55)  RR: 18 (12-16-24 @ 15:09) (18 - 18)  SpO2: 93% (12-16-24 @ 15:09) (93% - 96%)    Gen: NAD  HEENT: PERRL - 3-4mm, mouth clr, nose clr; off NC O2  Neck: no nodes, no JVD, thyroid nl  lungs: clr  hrt: s1 s2 reg, tachy, no murmur  abd: soft, NT/ND, no HS megaly;  + PEG site i simproving w/ less off-white/yellow creamy d/c; erythma/inflam (pat under bumper) is much reduced  ext: no edema, no c/c  neuro: lethargic, non-verb; not interactive;     LABS:                      8.3     (    93.1   12.50 )-----------( ---------      388      ( 16 Dec 2024 06:22 )             25.6    (    16.2     WBC Count: 12.50 K/uL (12-16-24 @ 06:22) - better  WBC Count: 11.29 K/uL (12-15-24 @ 11:21)  WBC Count: 13.29 K/uL (12-14-24 @ 06:25)  WBC Count: 18.19 K/uL (12-13-24 @ 06:57)  WBC Count: 25.61 K/uL (12-12-24 @ 06:03)    Hemoglobin: 8.3 g/dL (12-16 @ 06:22) - stable  Hemoglobin: 8.7 g/dL (12-15 @ 11:21)  Hemoglobin: 8.3 g/dL (12-14 @ 06:25)  Hemoglobin: 9.1 g/dL (12-13 @ 06:57)  Hemoglobin: 9.2 g/dL (12-12 @ 06:03)    142   (   111   (   187      12-16-24 @ 06:22  ----------------------               4.7   (   20   (   48                             -----                        1.1  Ca  8.9   Mg  --    P   --     Urinalysis Basic - ( 16 Dec 2024 06:22 )    Color: x / Appearance: x / SG: x / pH: x  Gluc: 187 mg/dL / Ketone: x  / Bili: x / Urobili: x   Blood: x / Protein: x / Nitrite: x   Leuk Esterase: x / RBC: x / WBC x   Sq Epi: x / Non Sq Epi: x / Bacteria: x    CAPILLARY BLOOD GLUCOSE  POCT Blood Glucose.: 207 (12-16-24 @ 11:37)  POCT Blood Glucose.: 238 (12-16-24 @ 07:49)  POCT Blood Glucose.: 197 (12-15-24 @ 21:25)  POCT Blood Glucose.: 180 (12-15-24 @ 17:23)  POCT Blood Glucose.: 211 (12-15-24 @ 11:12)  POCT Blood Glucose.: 221 (12-15-24 @ 07:56)  POCT Blood Glucose.: 159 (12-14-24 @ 21:14)  POCT Blood Glucose.: 124 (12-14-24 @ 16:52)    Culture - Body Fluid with Gram Stain (collected 12-11-24 @ 10:26)  Source: Pleural Fl  Gram Stain (12-11-24 @ 20:35):    polymorphonuclear leukocytes seen    No organisms seen    by cytocentrifuge  Final Report (12-16-24 @ 10:23):    No growth at 5 days    Culture - Urine (collected 12-10-24 @ 15:00)  Source: Catheterized None  Final Report (12-12-24 @ 13:43):    10,000 - 49,000 CFU/mL Candida albicans    "Susceptibilities not performed"    Culture - Blood (collected 12-10-24 @ 14:01)  Source: .Blood BLOOD  Final Report (12-15-24 @ 23:01):    No growth at 5 days    Culture - Blood (collected 12-10-24 @ 14:01)  Source: .Blood BLOOD  Final Report (12-15-24 @ 23:01):    No growth at 5 days    RADIOLOGY & ADDITIONAL TESTS:  < from: CT Angio Chest PE Protocol w/ IV Cont (12.12.24 @ 13:45) >  No central pulmonary emboli.    Mild right heart strain with RV LV ratio estimated at 1.2 cm (series 7/179).    Dilated main pulmonary artery 0.3 cm suggesting possible pulmonary hypertension.    Moderate size right pleural effusion, large left pleural effusion.    Right effusion has diminished from earlier study with relative hyperexpansion right lower lobe.    Persistent consolidative opacities right lower and upper lobes. No pneumothorax.    < end of copied text >    MEDICATIONS:  cefepime   IVPB 1000 milliGRAM(s) IV Intermittent every 12 hours  metroNIDAZOLE    Tablet 500 milliGRAM(s) Oral three times a day    amantadine Syrup 50 milliGRAM(s) Oral two times a day  amLODIPine   Tablet 5 milliGRAM(s) Oral at bedtime  chlorhexidine 2% Cloths 1 Application(s) Topical <User Schedule>  enoxaparin Injectable 30 milliGRAM(s) SubCutaneous every 24 hours  famotidine   Suspension 20 milliGRAM(s) Oral daily  insulin glargine Injectable (LANTUS) 5 Unit(s) SubCutaneous at bedtime  insulin lispro (ADMELOG) corrective regimen sliding scale   SubCutaneous three times a day before meals  labetalol 200 milliGRAM(s) Oral every 8 hours  lacosamide IVPB 200 milliGRAM(s) IV Intermittent every 12 hours  levETIRAcetam   Injectable 1500 milliGRAM(s) IV Push every 12 hours  mupirocin 2% Ointment 1 Application(s) Topical every 12 hours  nystatin Ointment 1 Application(s) Topical every 12 hours  pyridoxine 100 milliGRAM(s) Oral daily  vitamin A & D Ointment 1 Application(s) Topical two times a day

## 2024-12-16 NOTE — DISCHARGE NOTE PROVIDER - NSDCFUADDAPPT_GEN_ALL_CORE_FT
APPTS ARE READY TO BE MADE: [ ] YES    Best Family or Patient Contact (if needed):    Additional Information about above appointments (if needed):    1: Dr. Torres (PCP)  2:   3:     Other comments or requests:

## 2024-12-16 NOTE — DISCHARGE NOTE PROVIDER - NSDCMRMEDTOKEN_GEN_ALL_CORE_FT
amantadine 50 mg/5 mL oral liquid: 50 milligram(s) orally 2 times a day  amLODIPine 5 mg oral tablet: 1 tab(s) orally once a day (at bedtime)  famotidine 40 mg/5 mL oral suspension: 2.5 milliliter(s) orally once a day  glimepiride 2 mg oral tablet: 1 tab(s) orally 2 times a day  heparin: 5,000 unit(s) subcutaneous 2 times a day  hydrALAZINE 25 mg oral tablet: 1 tab(s) orally every 8 hours  insulin glargine 100 units/mL subcutaneous solution: 5 unit(s) subcutaneous once a day (at bedtime)  labetalol 200 mg oral tablet: 1 tab(s) orally every 8 hours  lacosamide 200 mg/20 mL intravenous solution: 20 milliliter(s) intravenous every 12 hours  levETIRAcetam 100 mg/mL intravenous solution: 15 milliliter(s) intravenous every 12 hours  lisinopril 10 mg oral tablet: 1 tab(s) orally once a day  Lokelma 10 g oral powder for reconstitution: 10 gram(s) orally once a day Give Lokelma 10 g daily for 5 days and then stop - check serum potassium after day 5 to evaluate  nystatin 100,000 units/g topical ointment: 1 Apply topically to affected area every 12 hours  pantoprazole 40 mg oral delayed release tablet: 1 tab(s) orally once a day (before a meal)  pyridoxine 100 mg oral tablet: 1 tab(s) orally once a day  senna leaf extract oral tablet: 2 tab(s) orally once a day (at bedtime)  SPS 15 GM/60 ML SUSPENSION: TAKE 15ML TWICE WEEKLY  vitamin A and D topical ointment: 1 Apply topically to affected area 2 times a day  zinc sulfate 220 mg (as elemental zinc 50 mg) oral capsule: 1 cap(s) orally once a day Continue for 3 more weeks and then stop   amantadine 50 mg/5 mL oral liquid: 50 milligram(s) orally 2 times a day  amLODIPine 5 mg oral tablet: 1 tab(s) orally once a day (at bedtime)  famotidine 40 mg/5 mL oral suspension: 2.5 milliliter(s) orally once a day  heparin: 5,000 unit(s) subcutaneous 2 times a day  insulin glargine 100 units/mL subcutaneous solution: 5 unit(s) subcutaneous once a day (at bedtime)  labetalol 200 mg oral tablet: 1 tab(s) orally every 8 hours  lacosamide 200 mg/20 mL intravenous solution: 20 milliliter(s) intravenous every 12 hours  levETIRAcetam 100 mg/mL intravenous solution: 15 milliliter(s) intravenous every 12 hours  lisinopril 10 mg oral tablet: 1 tab(s) orally once a day  Lokelma 10 g oral powder for reconstitution: 10 gram(s) orally once a day Give Lokelma 10 g daily for 5 days and then stop - check serum potassium after day 5 to evaluate  metroNIDAZOLE 500 mg oral tablet: 1 tab(s) orally 3 times a day stop after 12/17  nystatin 100,000 units/g topical ointment: 1 Apply topically to affected area every 12 hours  pantoprazole 40 mg oral delayed release tablet: 1 tab(s) orally once a day (before a meal)  pyridoxine 100 mg oral tablet: 1 tab(s) orally once a day  senna leaf extract oral tablet: 2 tab(s) orally once a day (at bedtime)  vitamin A and D topical ointment: 1 Apply topically to affected area 2 times a day  zinc sulfate 220 mg (as elemental zinc 50 mg) oral capsule: 1 cap(s) orally once a day Continue for 3 more weeks and then stop

## 2024-12-16 NOTE — DISCHARGE NOTE PROVIDER - HOSPITAL COURSE
HPI  A 79-year-old female with a complex medical history including myelodysplastic syndrome (MDS) followed by Dr. Wade (requiring periodic transfusions for anemia), diabetes mellitus, hypertension, chronic kidney disease, and multiple recent admissions for a right-sided subdural hematoma (SDH) with mass effect and midline shift (status-post evacuation and middle meningeal artery embolization complicated by a leaking craniotomy site and concern for status epilepticus) presented to the ED from her skilled nursing facility (SNF) due to lethargy and fever.  She is nonverbal, bedbound, and has a PEG tube.  She also has a recent history of *Enterococcus faecalis* urinary tract infection.    The SNF staff reported that the patient has been experiencing spiking fevers for the past 10 days.  A chest x-ray obtained at the SNF demonstrated infiltrates, prompting initiation of vancomycin for suspected pneumonia.  Her white blood cell count was elevated at 18,000/µL.  Urinalysis and urine culture were negative, as were blood cultures.  The patient has persistent anemia despite receiving weekly darbepoetin zachery (Aranesp) injections three times a week, with the last dose administered on Monday.  Her most recent hemoglobin level was 5.8 g/dL, prompting the SNF to contact the [facility] for a possible transfusion.  However, due to concerns about the patient's current infectious picture, the transfusion was deferred, and the patient was sent to the ED for further evaluation.  On presentation to the ED, the patient was lethargic.  Daughter on bedside also confirmed Diarrhea   denied nausea, vomiting, diarrhea, abdominal pain, chest pain, or sick contacts.    Physical examination revealed multiple potential sources of infection, including a right upper extremity midline, a right internal jugular port-a-cath, and a stage 3 sacral decubitus ulcer.    In Ed on vitals :  BP Systolic 164/67 mm Hg   Heart Rate	 105 /min , afebrile on 3L of NC     On labs WBC 15 .63 , Hb 5.2, eosinophil of 1.28, NA of 153 , UA positive e yeast     CXray showed B/L opacities and effusions     CTAP 1.  Large bilateral pleural effusions with adjacent compressive   atelectasis resulting in near complete left and complete right lower lobe   collapse.  2.  Mild edematous thickening of the colon with mucosal hyperemia can be   seen with colitis of infectious or inflammatory etiology. No bowel   obstruction.  3.  Trace ascites. Anasarca.    CTH : 1.  The right frontal convexity extra-axial collection has decreased in   size and density.    2.  The left frontal convexity isodense subdural collection has not   significantly changed.    In ed rcvd cefe , flagyl and vanco, 2l of LR      Pending : 2 packs of red blood cells has been ordered     admitted to SDU for further workup .     Hospital Course  Started on IVF, cefepime (started 12/10), vancomycin (started 12/10), and flagyl (started 12/12). Right sided thoracentesis done, 1 L of serous fluid drained. Though bilateral effusions present, per pulmonary team no need for left sided thoracentesis as patient is asymptomatic. GI brought onboard for PEG cellulitis and CT visualized colitis concerns: no need for PEG exchange given absence of abscess/collection with functional gastrostomy, infectious workup negative for determining colitis etiology (Cdiff, GI PCR, and stool O&P negative), can continue with tube feeds. Oxygen requirements decreased, patient currently on room air. Vancomycin discontinued as MRSA and PCR negative with no evidence of abscess. End date for cefepime tomorrow and 12/19 for flagyl.     Continue with sacral and PEG site wound care.    Sacral wound care 2 times per day: midline stage 3 sacral ulcer-  Cleanse wound with soap and water, saline, pat dry. Hydrocolloid primary dressing. Apply Triad cream twice daily PRN for soiling to sacrococcygeal area, groin, buttock and thighs. Please cleanse under PEG site with Vashe twice daily PRN for soiling and pat dry. Skin and incontinence care. Pressure Injury Prevention. Assess wound daily.    PEG site care: Clean the PEG site with soap and water daily. Apply clean dressing to the site. Tape the tube to the skin and abdominal binder to avoid tugging. Flush the tube with 50cc of water before and after feeds and medications.     Vitals and labs stable. Patient is medically cleared for discharge. Case discussed with Dr. Yadav.

## 2024-12-16 NOTE — DISCHARGE NOTE PROVIDER - NSDCCPCAREPLAN_GEN_ALL_CORE_FT
PRINCIPAL DISCHARGE DIAGNOSIS  Diagnosis: Sepsis  Assessment and Plan of Treatment: You were admitted from your nursing facility due to lethargy and fever. You've had fevers for the past 10 days, and a chest x-ray at the facility showed signs of possible pneumonia, so you were started on vancomycin. You also have low blood counts (anemia) despite medication, and a transfusion was considered but delayed due to the infection. Your daughter confirmed you've also had diarrhea.  Several possible infection sites were found, including your midline, port-a-cath, and a pressure ulcer on your sacrum. Tests showed a high white blood cell count, low hemoglobin, high sodium, and yeast in your urine. A chest x-ray showed fluid in your lungs, and a CT scan confirmed this, also showing some inflammation in your colon and some swelling. A head CT showed that a previously noted fluid collection had decreased. You were started on antibiotics (cefepime, vancomycin, and flagyl) and given fluids. A blood transfusion was ordered.  During your hospital stay, you received IV fluids and antibiotics. Fluid was drained from around your right lung. The GI team evaluated your PEG tube site and the inflammation in your colon, but determined no further intervention was needed at this time. Your oxygen needs decreased, and the vancomycin was stopped. You will continue wound care for your sacral ulcer and PEG tube site. Your vital signs and labs are stable, and you are being discharged.  Glucerna 1.2 bolus feeds 375ml q6h, free water flushes 50 cc before and after feeds.  Please take medicines as prescribed. Please visit your primary care provider in 1 week.   If you experience worsening of symptoms from baseline, fevers, nausea. vomiting, diarrhea, loss of consciousness, call 911 and go to hospital immediately.        SECONDARY DISCHARGE DIAGNOSES  Diagnosis: Cellulitis at gastrostomy tube site  Assessment and Plan of Treatment: PEG site care: Clean the PEG site with soap and water daily. Apply clean dressing to the site. Tape the tube to the skin and abdominal binder to avoid tugging. Flush the tube with 50cc of water before and after feeds and medications.    Diagnosis: Ulcer of sacral region, stage 3  Assessment and Plan of Treatment: Sacral wound care 2 times per day: midline stage 3 sacral ulcer-  Cleanse wound with soap and water, saline, pat dry. Hydrocolloid primary dressing. Apply Triad cream twice daily PRN for soiling to sacrococcygeal area, groin, buttock and thighs. Please cleanse under PEG site with Vashe twice daily PRN for soiling and pat dry. Skin and incontinence care. Pressure Injury Prevention. Assess wound daily.

## 2024-12-16 NOTE — DISCHARGE NOTE PROVIDER - NSDCDCMDCOMP_GEN_ALL_CORE
Instructions: This plan will send the code FBSE to the PM system.  DO NOT or CHANGE the price.
Price (Do Not Change): 0.00
Detail Level: Generalized
This document is complete and the patient is ready for discharge.

## 2024-12-16 NOTE — DISCHARGE NOTE PROVIDER - NSDCFUSCHEDAPPT_GEN_ALL_CORE_FT
[facility] PreAdmits  Scheduled Appointment: 12/31/2024    [facility] Physician [facility]  [address]  [address]  Scheduled Appointment: 12/31/2024    Harrison Cornell  [facility] Physician [facility]  NEUROSURG [address]  Scheduled Appointment: 01/07/2025

## 2024-12-16 NOTE — DISCHARGE NOTE NURSING/CASE MANAGEMENT/SOCIAL WORK - PATIENT PORTAL LINK FT
You can access the FollowMyHealth Patient Portal offered by [facility] by registering at the following website: [email or web address]. By joining [facility]’s FollowMyHealth portal, you will also be able to view your health information using other applications (apps) compatible with our system.

## 2024-12-26 ENCOUNTER — TRANSCRIPTION ENCOUNTER (OUTPATIENT)
Age: 79
End: 2024-12-26

## 2024-12-30 ENCOUNTER — TRANSCRIPTION ENCOUNTER (OUTPATIENT)
Age: 79
End: 2024-12-30

## 2024-12-31 ENCOUNTER — LABORATORY RESULT (OUTPATIENT)
Age: 79
End: 2024-12-31

## 2024-12-31 ENCOUNTER — APPOINTMENT (OUTPATIENT)
Age: 79
End: 2024-12-31

## 2024-12-31 VITALS — HEART RATE: 97 BPM | RESPIRATION RATE: 17 BRPM | OXYGEN SATURATION: 100 %

## 2024-12-31 DIAGNOSIS — D46.9 MYELODYSPLASTIC SYNDROME, UNSPECIFIED: ICD-10-CM

## 2024-12-31 DIAGNOSIS — C94.6 MYELODYSPLASTIC DISEASE, NOT CLASSIFIED: ICD-10-CM

## 2024-12-31 DIAGNOSIS — Z91.81 HISTORY OF FALLING: ICD-10-CM

## 2024-12-31 PROCEDURE — 99214 OFFICE O/P EST MOD 30 MIN: CPT

## 2024-12-31 NOTE — H&P ADULT - HISTORY OF PRESENT ILLNESS
Case of 79-year-old woman with PMHx of myelodysplastic syndrome (MDS) followed by Dr. Wade (requiring periodic transfusions for anemia), DM, HTN, CKD 2, and multiple recent admissions for a b/l subdural hematomas (SDH) with mass effect and midline shift (status-post evacuation and middle meningeal artery embolization complicated by a leaking craniotomy site and concern for status epilepticus) presented to the ED anemia.    Patient was at [facility] was found to be anemic to 5.6 being sent to ED for transfusion.   She is nonverbal, bedbound, and has a PEG tube.   I am unable to obtain a comprehensive history, review of systems, past medical history, and/or physical exam due to constraints imposed by the patient's clinical condition and/or mental status.    In the ED, she was grossly stable. Later developed a fever of 100.5    Labs: Hb 5.4, potassium 7.6  UA suggestive of infection    In the ED, given 1.5L NS + Insulin/d50/Calcium/lasix/albuterol       Case of 79-year-old woman with PMHx of myelodysplastic syndrome (MDS) followed by Dr. Wade (requiring periodic transfusions for anemia), DM, HTN, CKD 2, and multiple recent admissions for a b/l subdural hematomas (SDH) with mass effect and midline shift (status-post evacuation and middle meningeal artery embolization complicated by a leaking craniotomy site and concern for status epilepticus) presented to the ED anemia.    Patient was at [facility] was found to be anemic to 5.6 being sent to ED for transfusion.   She is nonverbal, bedbound, and has a PEG tube.   I am unable to obtain a comprehensive history, review of systems, past medical history, and/or physical exam due to constraints imposed by the patient's clinical condition and/or mental status.    Upon arrival to unit, patient noted to have diarrhea, non bloody. Also noted to be on loperamide PRN.    In the ED, she was grossly stable. Later developed a fever of 100.5    Labs: Hb 5.4, potassium 7.6  UA suggestive of infection    In the ED, given 1.5L NS + Insulin/d50/Calcium/lasix/albuterol

## 2024-12-31 NOTE — ED CLERICAL - NS ED CARE COORDINATION INFORMATION
This patient is enrolled in the STAR readmission reduction initiative and has active care navigation. This patient can be followed up by the care navigation team within 24 hours.  To arrange close follow-up or to obtain additional clinical information, please call the contact number above. The on-call [facility] Hospitalist has been notified and will coordinate care in concert with the ED Physician including consults as necessary. Please reach out to the Hospitalist on-call directly (schedule on AMiON posted on the intranet). You may also call the Hospitalist Division at [phone] at either site. Consider CDU for management per guideline”

## 2024-12-31 NOTE — ED ADULT NURSE NOTE - CHIEF COMPLAINT QUOTE
Pt here brought in from [facility] for low hgb. 5.3. needs transfusion. Pt hx of brainbleed is not responsive since. As per daughter this is patient zara.

## 2024-12-31 NOTE — ED PROVIDER NOTE - PHYSICAL EXAMINATION
CONST: NAD  EYES: Sclera and conjunctiva pale.   ENT: No nasal discharge. Oropharynx normal appearing.   CARD: S1 S2; No jvd  RESP: Equal BS B/L, No wheezes, rhonchi or rales. No distress  SKIN: pale

## 2024-12-31 NOTE — ED ADULT TRIAGE NOTE - CHIEF COMPLAINT QUOTE
Pt here brought in from [facility] for low hgb. 5.3. needs transfusion. Pt hx of brainbleed is not responsive since. As per daughter this is patient zara.

## 2024-12-31 NOTE — ED PROVIDER NOTE - IV ALTEPLASE INCLUSION HIDDEN
Pt tolerating Ozempic 1mg weekly for weight loss well (/84, HR 96). She is losing adeqate weight (on avg 4 lbs per month) with ozemipic and lifestyle modifications. Semaglutide is recommended to be increased to 1.7 mg weekly for four weeks then finally to 2.4 weekly for maintenance, however, only Wegovy comes in 1.7 and 2.4 mg dosages. [facility] [place] is not covered by pt's insurance. Ozempic doses come in 1 mg and 2 mg.      Plan  - Increase to Ozempic 2 mg weekly for maintenance, if intolerable side effects, encouraged pt to call office and I will reorder Ozempic 1 mg weekly and will continue this as maintenance dose show

## 2024-12-31 NOTE — H&P ADULT - NSHPLABSRESULTS_GEN_ALL_CORE
VITAL SIGNS: Last 24 Hours  T(C): 38.1 (31 Dec 2024 21:26), Max: 38.1 (31 Dec 2024 21:26)  T(F): 100.5 (31 Dec 2024 21:26), Max: 100.5 (31 Dec 2024 21:26)  HR: 93 (31 Dec 2024 16:30) (93 - 93)  BP: 116/51 (31 Dec 2024 16:30) (116/51 - 116/51)  BP(mean): --  RR: 16 (31 Dec 2024 16:30) (16 - 16)  SpO2: 100% (31 Dec 2024 16:30) (100% - 100%)    LABS:                        5.8    9.04  )-----------( 349      ( 31 Dec 2024 18:55 )             18.9         143  |  110  |  63[HH]  ----------------------------<  343[H]  7.1[HH]   |  26  |  1.2    Ca    9.5      31 Dec 2024 19:00    TPro  5.6[L]  /  Alb  3.4[L]  /  TBili  0.4  /  DBili  x   /  AST  17  /  ALT  15  /  AlkPhos  110        Urinalysis Basic - ( 31 Dec 2024 21:53 )    Color: Yellow / Appearance: Cloudy / S.017 / pH: x  Gluc: x / Ketone: Negative mg/dL  / Bili: Negative / Urobili: 0.2 mg/dL   Blood: x / Protein: 100 mg/dL / Nitrite: Negative   Leuk Esterase: Large / RBC: 5 /HPF /  /HPF   Sq Epi: x / Non Sq Epi: 0 /HPF / Bacteria: Negative /HPF            Urinalysis with Rflx Culture (collected 31 Dec 2024 21:53)          RADIOLOGY:

## 2024-12-31 NOTE — ED PROVIDER NOTE - OBJECTIVE STATEMENT
79-year-old female past medical history of MDS, diabetes, hypertension, CKD, subdural, PEG presents for anemia.  Patient was at cancer center was found to be anemic to 5.6 being sent to ED for transfusion.  As per family member patient at baseline status. Denies any bleeding. I am unable to obtain a comprehensive history, review of systems, past medical history, and/or physical exam due to constraints imposed by the patient's clinical condition and/or mental status.

## 2024-12-31 NOTE — H&P ADULT - ASSESSMENT
79-year-old woman with PMHx of myelodysplastic syndrome (MDS) followed by Dr. Wade (requiring periodic transfusions for anemia), DM, HTN, CKD 2, and multiple recent admissions for a b/l subdural hematomas (SDH) with mass effect and midline shift (status-post evacuation and middle meningeal artery embolization complicated by a leaking craniotomy site and concern for status epilepticus) presented to the ED anemia.    IMPRESSION:    #Anemia likely due to MDS (transfusion dependant)  #Hyperkalemia  #CKD2 (Creat at baseline)  #Fever due to possible UTI  #HTN  #DM  #Hx of SDH (sp evacuation and middle meningeal artery embolization complicated by a leaking craniotomy site and concern for status epilepticus)       PLAN:    CNS: Avoid CNS Depressants. c/w home antiepileptics.    HEENT: Oral care. Aspiration precautions     PULMONARY: HOB @ 45. Monitor Pulse Ox. Keep > 93%. Supplement as needed.    CARDIOVASCULAR:   - Daily Weight. Strict I&Os. Keep I < O  - sp Lasix in ED for hyperkalemia  - NS IVF for now    GI: GI prophylaxis. PEG feeding nepro    RENAL: Standing lokelma. Repeat EKG. Will discuss with nephro if potassium does not drop below 6 sp treatment given in ED. Hold all meds that may cause hyperkalemia, no potassium feeding per peg. Nephrology eval. Get CK level    INFECTIOUS DISEASE: Empiric Rocephin + ampicillin given history of enterococcal UTI. ID eval. Procal    HEMATOLOGICAL: DVT prophylaxis since no active bleed. Transfuse 2u today and repeat CBC. Hematology eval. Hemolytic panel. Duplex    ENDOCRINE: Monitor FS. Resume home insulin regimen.    MUSCULOSKELETAL: bedrest    CODE STATUS: FULL    DISPOSITION: MICU               79-year-old woman with PMHx of myelodysplastic syndrome (MDS) followed by Dr. Wade (requiring periodic transfusions for anemia), DM, HTN, CKD 2, and multiple recent admissions for a b/l subdural hematomas (SDH) with mass effect and midline shift (status-post evacuation and middle meningeal artery embolization complicated by a leaking craniotomy site and concern for status epilepticus) presented to the ED anemia.    IMPRESSION:    #Anemia likely due to MDS (transfusion dependant)  #Hyperkalemia  #CKD2 (Creat at baseline)  #Fever due to possible UTI  #Stage 3 sacral ulcer  #HTN  #DM  #Hx of SDH (sp evacuation and middle meningeal artery embolization complicated by a leaking craniotomy site and concern for status epilepticus)       PLAN:    CNS: Avoid CNS Depressants. c/w home antiepileptics.    HEENT: Oral care. Aspiration precautions     PULMONARY: HOB @ 45. Monitor Pulse Ox. Keep > 93%. Supplement as needed.    CARDIOVASCULAR:   - Daily Weight. Strict I&Os. Keep I < O  - sp Lasix in ED for hyperkalemia  - Hold home lasix  - NS IVF for 12 hours  - c/w amlodipine for HTN  - Hold lisinopril  - Hold labetalol for now, resume if BP uncontrolled and potassium controlled    GI: GI prophylaxis. PEG feeding nepro    RENAL: Standing lokelma. Repeat EKG. Will discuss with nephro if potassium does not drop below 6 sp treatment given in ED. Hold all meds that may cause hyperkalemia, no potassium feeding per peg. Nephrology eval. Get CK level    INFECTIOUS DISEASE: Empiric Rocephin + ampicillin given history of enterococcal UTI. ID eval. Procal. Sacral ulcer does not look infected.    HEMATOLOGICAL: DVT prophylaxis since no active bleed. Transfuse 2u today and repeat CBC. Hematology eval. Hemolytic panel. Duplex    ENDOCRINE: Monitor FS. Resume home insulin regimen.    MUSCULOSKELETAL: bedrest. Burn eval for sacral ulcer.    CODE STATUS: FULL    DISPOSITION: MICU               79-year-old woman with PMHx of myelodysplastic syndrome (MDS) followed by Dr. Wade (requiring periodic transfusions for anemia), DM, HTN, CKD 2, and multiple recent admissions for a b/l subdural hematomas (SDH) with mass effect and midline shift (status-post evacuation and middle meningeal artery embolization complicated by a leaking craniotomy site and concern for status epilepticus) presented to the ED anemia.    IMPRESSION:    #Anemia likely due to MDS (transfusion dependant)  #Hyperkalemia  #CKD2 (Creat at baseline)  #Fever due to possible UTI  #Stage 3 sacral ulcer  #Diarrhea  #HTN  #DM  #Hx of SDH (sp evacuation and middle meningeal artery embolization complicated by a leaking craniotomy site and concern for status epilepticus)       PLAN:    CNS: Avoid CNS Depressants. c/w home antiepileptics.    HEENT: Oral care. Aspiration precautions     PULMONARY: HOB @ 45. Monitor Pulse Ox. Keep > 93%. Supplement as needed.    CARDIOVASCULAR:   - Daily Weight. Strict I&Os. Keep I < O  - sp Lasix in ED for hyperkalemia  - Hold home lasix  - NS IVF for 12 hours  - c/w amlodipine for HTN  - Hold lisinopril  - Hold labetalol for now, resume if BP uncontrolled and potassium controlled    GI: GI prophylaxis. PEG feeding nepro. Hold laxatives. Get C diff if diarrhea persistent.    RENAL: Standing lokelma. Repeat EKG. Will discuss with nephro if potassium does not drop below 6 sp treatment given in ED. Hold all meds that may cause hyperkalemia, no potassium feeding per peg. Nephrology eval. Get CK level    INFECTIOUS DISEASE: Empiric Rocephin + ampicillin given history of enterococcal UTI. ID eval. Procal. Sacral ulcer does not look infected.    HEMATOLOGICAL: DVT prophylaxis since no active bleed. Transfuse 2u today and repeat CBC. Hematology eval. Hemolytic panel. Duplex    ENDOCRINE: Monitor FS. Resume home insulin regimen.    MUSCULOSKELETAL: bedrest. Burn eval for sacral ulcer.    CODE STATUS: FULL    DISPOSITION: MICU               79-year-old woman with PMHx of myelodysplastic syndrome (MDS) followed by Dr. Wade (requiring periodic transfusions for anemia), DM, HTN, CKD 2, and multiple recent admissions for a b/l subdural hematomas (SDH) with mass effect and midline shift (status-post evacuation and middle meningeal artery embolization complicated by a leaking craniotomy site and concern for status epilepticus) presented to the ED anemia.    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   #CKD2 (Creat at baseline)  #Fever due to possible UTI  #Stage 3 sacral ulcer  #Diarrhea  #HTN  #DM  #Hx of SDH (sp evacuation and middle meningeal artery embolization complicated by a leaking craniotomy site and concern for status epilepticus)      PLAN:    CNS: Avoid CNS Depressants. c/w home antiepileptics.    HEENT: Oral care. Aspiration precautions     PULMONARY: HOB @ 45. Monitor Pulse Ox. Keep > 93%. Supplement as needed.    CARDIOVASCULAR:   - Daily Weight. Strict I&Os. Keep I < O  - sp Lasix in ED for hyperkalemia  - Hold home lasix  - NS IVF for 12 hours  - c/w amlodipine for HTN  - Hold lisinopril  - Hold labetalol for now, resume if BP uncontrolled and potassium controlled    GI: GI prophylaxis. PEG feeding nepro. Hold laxatives. Get C diff if diarrhea persistent.    RENAL: Standing lokelma. Repeat EKG. Will discuss with nephro if potassium does not drop below 6 sp treatment given in ED. Hold all meds that may cause hyperkalemia, no potassium feeding per peg. Nephrology eval. Get CK level    INFECTIOUS DISEASE: Empiric Rocephin + ampicillin given history of enterococcal UTI. ID eval. Procal. Sacral ulcer does not look infected.    HEMATOLOGICAL: DVT prophylaxis since no active bleed. Transfuse 2u today and repeat CBC. Hematology eval. Hemolytic panel. Duplex    ENDOCRINE: Monitor FS. Resume home insulin regimen.    MUSCULOSKELETAL: bedrest. Burn eval for sacral ulcer.    CODE STATUS: FULL    DISPOSITION: MICU

## 2024-12-31 NOTE — ED PROVIDER NOTE - NSICDXPASTSURGICALHX_GEN_ALL_CORE_FT
VSS and WNL.  Assessment WNL; exception scrotal swelling, small skin tag below left nipple.  Voiding and stooling appropriately for age.  Breastfeeding well with multiple swallow heard.  Lactation visit this evening (see lactation note).  Parents responsive to cues and positive bonding observed.  Meeting expected goals.   PAST SURGICAL HISTORY:  H/O colonoscopy     S/P craniotomy     S/P percutaneous endoscopic gastrostomy (PEG) tube placement

## 2024-12-31 NOTE — ED PROVIDER NOTE - CARE PLAN
1 Principal Discharge DX:	Sepsis due to urinary tract infection  Secondary Diagnosis:	Hyperkalemia

## 2024-12-31 NOTE — H&P ADULT - NSHPPHYSICALEXAM_GEN_ALL_CORE
PHYSICAL EXAM:  CONSTITUTIONAL: No acute distress, well-developed, well-groomed, AAOx3  HEAD: Atraumatic, normocephalic  EYES: EOM intact, PERRLA, conjunctiva and sclera clear  ENT: Supple, no masses, no thyromegaly, no bruits, no JVD; moist mucous membranes  PULMONARY: Clear to auscultation bilaterally; no wheezes, rales, or rhonchi  CARDIOVASCULAR: Regular rate and rhythm; no murmurs, rubs, or gallops  GASTROINTESTINAL: Soft, non-tender, non-distended; bowel sounds present  MUSCULOSKELETAL: 2+ peripheral pulses; no clubbing, no cyanosis, no edema  NEUROLOGY: non-focal  SKIN: No rashes or lesions; warm and dry PHYSICAL EXAM:  CONSTITUTIONAL: No acute distress  PULMONARY: Clear to auscultation bilaterally; no wheezes  CARDIOVASCULAR: Regular rate and rhythm  GASTROINTESTINAL: Soft, non-tender, non-distended; PEG noted  MUSCULOSKELETAL:  trace LLE

## 2024-12-31 NOTE — ED PROVIDER NOTE - CLINICAL SUMMARY MEDICAL DECISION MAKING FREE TEXT BOX
79-year-old woman, history of hypertension, diabetes, CKD, MDS, recent craniotomies for subdural hematoma now nonverbal, bedbound, PEG tube was sent in from cancer center due to hemoglobin of 5.6.  Patient's daughter at bedside gives all history and reports the patient is at her baseline mental status.  Her MDS is actively being treated and tho family has been approached by palliative care/hospice they are not currently amenable to those services.  On exam, patient appears frail and chronically ill.  Low-grade temp.  Pale.  Oropharynx with dried mucus, teeth also covered with dry mucous.  Lungs with poor air entry at the bases, CV S1-S2, abdomen soft.  G-tube in place.  Labs consistent with MDS and acute on chronic anemia.  Mild hyperglycemia.  Hyperkalemia to 7.6 was repeated with VBG and 7.1.  EKG with first-degree AV block and anterior T wave inversions.  Chest x-ray with increasing bilateral effusions from prior.  Patient was given calcium, dextrose, insulin for the hyperkalemia as well as antibiotics and fluids in consideration of sepsis/UTI.  Patient admitted to the ICU despite poor prognosis.

## 2024-12-31 NOTE — ED ADULT TRIAGE NOTE - PATIENT ON (OXYGEN DELIVERY METHOD)
[Clean/Dry/Intact] : clean, dry and intact [Healed] : healed [Neuro Intact] : an unremarkable neurological exam [Vascular Intact] : ~T peripheral vascular exam normal [Doing Well] : is doing well [No Sign of Infection] : is showing no signs of infection [Adequate Pain Control] : has adequate pain control [Sutures Removed] : sutures were removed [Steri-Strips Removed & Replaced] : steri-strips removed and replaced [Chills] : no chills [Fever] : no fever [Erythema] : not erythematous [Discharge] : absent of discharge [Dehiscence] : not dehisced [de-identified] : DOS: 9/12/24 [de-identified] : 8 days s/p Left index finger, left ring fingers flexor mechanism decompression. Left ring finger tenosynovial cyst excision. Left index finger flexor mechanism decompression. Pathology not back yet.  Patient states she noticed left index finger PIP cracking sensation right after surgery which has been improving.  [de-identified] : Incisions well-healed, no evidence of infection, minimal swelling, sensation grossly intact to light touch, good digital range of motion of left index finger with no PIP contracture and no crepitus felt, near full flexion of left ring finger with a about 5 to 10 degree PIP contracture [de-identified] : 8 days s/p Left index finger, left ring fingers flexor mechanism decompression. Left ring finger tenosynovial cyst excision. Left index finger flexor mechanism decompression. [de-identified] : Sutures removed, benzoin Steri-Strips applied Patient will be seen by thenar therapist today for a left ring finger PIP extension splint to be worn at nighttime only.  If it PIP contracture develops of the left index finger a PIP extension splint will be made by the outside therapist which was included in the therapy prescription.  Discussed with patient left index finger crepitus and could consider postop injection with Dr. Jackson at next visit if symptoms worsen  Patient will begin home exercise program shown today and begin therapy next week Follow-up in 6 weeks from today or sooner if any concerns. nasal cannula

## 2025-01-01 ENCOUNTER — TRANSCRIPTION ENCOUNTER (OUTPATIENT)
Age: 80
End: 2025-01-01

## 2025-01-01 ENCOUNTER — APPOINTMENT (OUTPATIENT)
Dept: NEUROSURGERY | Facility: CLINIC | Age: 80
End: 2025-01-01

## 2025-01-01 VITALS
TEMPERATURE: 100 F | RESPIRATION RATE: 22 BRPM | DIASTOLIC BLOOD PRESSURE: 67 MMHG | HEART RATE: 90 BPM | SYSTOLIC BLOOD PRESSURE: 118 MMHG | OXYGEN SATURATION: 97 %

## 2025-01-01 DIAGNOSIS — A41.9 SEPSIS, UNSPECIFIED ORGANISM: ICD-10-CM

## 2025-01-01 DIAGNOSIS — Z51.5 ENCOUNTER FOR PALLIATIVE CARE: ICD-10-CM

## 2025-01-01 DIAGNOSIS — D64.9 ANEMIA, UNSPECIFIED: ICD-10-CM

## 2025-01-01 DIAGNOSIS — D46.9 MYELODYSPLASTIC SYNDROME, UNSPECIFIED: ICD-10-CM

## 2025-01-01 DIAGNOSIS — Z71.89 OTHER SPECIFIED COUNSELING: ICD-10-CM

## 2025-01-01 DIAGNOSIS — Z98.890 OTHER SPECIFIED POSTPROCEDURAL STATES: ICD-10-CM

## 2025-01-01 DIAGNOSIS — R13.10 DYSPHAGIA, UNSPECIFIED: ICD-10-CM

## 2025-01-01 LAB
ALBUMIN SERPL ELPH-MCNC: 2.8 G/DL — LOW (ref 3.5–5.2)
ALBUMIN SERPL ELPH-MCNC: 2.8 G/DL — LOW (ref 3.5–5.2)
ALBUMIN SERPL ELPH-MCNC: 2.9 G/DL — LOW (ref 3.5–5.2)
ALBUMIN SERPL ELPH-MCNC: 2.9 G/DL — LOW (ref 3.5–5.2)
ALBUMIN SERPL ELPH-MCNC: 3 G/DL — LOW (ref 3.5–5.2)
ALBUMIN SERPL ELPH-MCNC: 3.1 G/DL — LOW (ref 3.5–5.2)
ALBUMIN SERPL ELPH-MCNC: 3.1 G/DL — LOW (ref 3.5–5.2)
ALBUMIN SERPL ELPH-MCNC: 3.4 G/DL — LOW (ref 3.5–5.2)
ALP SERPL-CCNC: 104 U/L — SIGNIFICANT CHANGE UP (ref 30–115)
ALP SERPL-CCNC: 115 U/L — SIGNIFICANT CHANGE UP (ref 30–115)
ALP SERPL-CCNC: 120 U/L — HIGH (ref 30–115)
ALP SERPL-CCNC: 122 U/L — HIGH (ref 30–115)
ALP SERPL-CCNC: 130 U/L — HIGH (ref 30–115)
ALP SERPL-CCNC: 146 U/L — HIGH (ref 30–115)
ALP SERPL-CCNC: 159 U/L — HIGH (ref 30–115)
ALP SERPL-CCNC: 165 U/L — HIGH (ref 30–115)
ALP SERPL-CCNC: 172 U/L — HIGH (ref 30–115)
ALP SERPL-CCNC: 178 U/L — HIGH (ref 30–115)
ALP SERPL-CCNC: 180 U/L — HIGH (ref 30–115)
ALT FLD-CCNC: 16 U/L — SIGNIFICANT CHANGE UP (ref 0–41)
ALT FLD-CCNC: 17 U/L — SIGNIFICANT CHANGE UP (ref 0–41)
ALT FLD-CCNC: 17 U/L — SIGNIFICANT CHANGE UP (ref 0–41)
ALT FLD-CCNC: 20 U/L — SIGNIFICANT CHANGE UP (ref 0–41)
ALT FLD-CCNC: 23 U/L — SIGNIFICANT CHANGE UP (ref 0–41)
ALT FLD-CCNC: 34 U/L — SIGNIFICANT CHANGE UP (ref 0–41)
ALT FLD-CCNC: 36 U/L — SIGNIFICANT CHANGE UP (ref 0–41)
ALT FLD-CCNC: 37 U/L — SIGNIFICANT CHANGE UP (ref 0–41)
ALT FLD-CCNC: 39 U/L — SIGNIFICANT CHANGE UP (ref 0–41)
ALT FLD-CCNC: 41 U/L — SIGNIFICANT CHANGE UP (ref 0–41)
ALT FLD-CCNC: 46 U/L — HIGH (ref 0–41)
ANION GAP SERPL CALC-SCNC: 10 MMOL/L — SIGNIFICANT CHANGE UP (ref 7–14)
ANION GAP SERPL CALC-SCNC: 11 MMOL/L — SIGNIFICANT CHANGE UP (ref 7–14)
ANION GAP SERPL CALC-SCNC: 12 MMOL/L — SIGNIFICANT CHANGE UP (ref 7–14)
ANION GAP SERPL CALC-SCNC: 13 MMOL/L — SIGNIFICANT CHANGE UP (ref 7–14)
AST SERPL-CCNC: 21 U/L — SIGNIFICANT CHANGE UP (ref 0–41)
AST SERPL-CCNC: 24 U/L — SIGNIFICANT CHANGE UP (ref 0–41)
AST SERPL-CCNC: 26 U/L — SIGNIFICANT CHANGE UP (ref 0–41)
AST SERPL-CCNC: 28 U/L — SIGNIFICANT CHANGE UP (ref 0–41)
AST SERPL-CCNC: 30 U/L — SIGNIFICANT CHANGE UP (ref 0–41)
AST SERPL-CCNC: 38 U/L — SIGNIFICANT CHANGE UP (ref 0–41)
AST SERPL-CCNC: 42 U/L — HIGH (ref 0–41)
AST SERPL-CCNC: 46 U/L — HIGH (ref 0–41)
AST SERPL-CCNC: 47 U/L — HIGH (ref 0–41)
AST SERPL-CCNC: 56 U/L — HIGH (ref 0–41)
AST SERPL-CCNC: 69 U/L — HIGH (ref 0–41)
BASOPHILS # BLD AUTO: 0.09 K/UL — SIGNIFICANT CHANGE UP (ref 0–0.2)
BASOPHILS # BLD AUTO: 0.09 K/UL — SIGNIFICANT CHANGE UP (ref 0–0.2)
BASOPHILS # BLD AUTO: 0.1 K/UL — SIGNIFICANT CHANGE UP (ref 0–0.2)
BASOPHILS # BLD AUTO: 0.12 K/UL — SIGNIFICANT CHANGE UP (ref 0–0.2)
BASOPHILS # BLD AUTO: 0.13 K/UL — SIGNIFICANT CHANGE UP (ref 0–0.2)
BASOPHILS # BLD AUTO: 0.13 K/UL — SIGNIFICANT CHANGE UP (ref 0–0.2)
BASOPHILS # BLD AUTO: 0.14 K/UL — SIGNIFICANT CHANGE UP (ref 0–0.2)
BASOPHILS # BLD AUTO: 0.14 K/UL — SIGNIFICANT CHANGE UP (ref 0–0.2)
BASOPHILS # BLD AUTO: 0.15 K/UL — SIGNIFICANT CHANGE UP (ref 0–0.2)
BASOPHILS # BLD AUTO: 0.16 K/UL — SIGNIFICANT CHANGE UP (ref 0–0.2)
BASOPHILS # BLD AUTO: 0.16 K/UL — SIGNIFICANT CHANGE UP (ref 0–0.2)
BASOPHILS # BLD AUTO: 0.28 K/UL — HIGH (ref 0–0.2)
BASOPHILS NFR BLD AUTO: 0.7 % — SIGNIFICANT CHANGE UP (ref 0–1)
BASOPHILS NFR BLD AUTO: 0.7 % — SIGNIFICANT CHANGE UP (ref 0–1)
BASOPHILS NFR BLD AUTO: 0.8 % — SIGNIFICANT CHANGE UP (ref 0–1)
BASOPHILS NFR BLD AUTO: 0.9 % — SIGNIFICANT CHANGE UP (ref 0–1)
BASOPHILS NFR BLD AUTO: 1 % — SIGNIFICANT CHANGE UP (ref 0–1)
BASOPHILS NFR BLD AUTO: 1.2 % — HIGH (ref 0–1)
BASOPHILS NFR BLD AUTO: 1.2 % — HIGH (ref 0–1)
BASOPHILS NFR BLD AUTO: 1.3 % — HIGH (ref 0–1)
BASOPHILS NFR BLD AUTO: 1.3 % — HIGH (ref 0–1)
BASOPHILS NFR BLD AUTO: 1.5 % — HIGH (ref 0–1)
BASOPHILS NFR BLD AUTO: 2.6 % — HIGH (ref 0–1)
BILIRUB SERPL-MCNC: 0.2 MG/DL — SIGNIFICANT CHANGE UP (ref 0.2–1.2)
BILIRUB SERPL-MCNC: 0.3 MG/DL — SIGNIFICANT CHANGE UP (ref 0.2–1.2)
BILIRUB SERPL-MCNC: 0.4 MG/DL — SIGNIFICANT CHANGE UP (ref 0.2–1.2)
BILIRUB SERPL-MCNC: 0.9 MG/DL — SIGNIFICANT CHANGE UP (ref 0.2–1.2)
BILIRUB SERPL-MCNC: <0.2 MG/DL — SIGNIFICANT CHANGE UP (ref 0.2–1.2)
BUN SERPL-MCNC: 46 MG/DL — HIGH (ref 10–20)
BUN SERPL-MCNC: 47 MG/DL — HIGH (ref 10–20)
BUN SERPL-MCNC: 48 MG/DL — HIGH (ref 10–20)
BUN SERPL-MCNC: 50 MG/DL — HIGH (ref 10–20)
BUN SERPL-MCNC: 50 MG/DL — HIGH (ref 10–20)
BUN SERPL-MCNC: 52 MG/DL — HIGH (ref 10–20)
BUN SERPL-MCNC: 56 MG/DL — HIGH (ref 10–20)
BUN SERPL-MCNC: 59 MG/DL — HIGH (ref 10–20)
BUN SERPL-MCNC: 59 MG/DL — HIGH (ref 10–20)
BUN SERPL-MCNC: 60 MG/DL — HIGH (ref 10–20)
BUN SERPL-MCNC: 60 MG/DL — HIGH (ref 10–20)
BUN SERPL-MCNC: 61 MG/DL — CRITICAL HIGH (ref 10–20)
BUN SERPL-MCNC: 62 MG/DL — CRITICAL HIGH (ref 10–20)
BUN SERPL-MCNC: 65 MG/DL — CRITICAL HIGH (ref 10–20)
CALCIUM SERPL-MCNC: 8.6 MG/DL — SIGNIFICANT CHANGE UP (ref 8.4–10.5)
CALCIUM SERPL-MCNC: 8.7 MG/DL — SIGNIFICANT CHANGE UP (ref 8.4–10.4)
CALCIUM SERPL-MCNC: 8.7 MG/DL — SIGNIFICANT CHANGE UP (ref 8.4–10.4)
CALCIUM SERPL-MCNC: 8.7 MG/DL — SIGNIFICANT CHANGE UP (ref 8.4–10.5)
CALCIUM SERPL-MCNC: 8.9 MG/DL — SIGNIFICANT CHANGE UP (ref 8.4–10.4)
CALCIUM SERPL-MCNC: 8.9 MG/DL — SIGNIFICANT CHANGE UP (ref 8.4–10.4)
CALCIUM SERPL-MCNC: 8.9 MG/DL — SIGNIFICANT CHANGE UP (ref 8.4–10.5)
CALCIUM SERPL-MCNC: 9 MG/DL — SIGNIFICANT CHANGE UP (ref 8.4–10.4)
CALCIUM SERPL-MCNC: 9 MG/DL — SIGNIFICANT CHANGE UP (ref 8.4–10.5)
CALCIUM SERPL-MCNC: 9 MG/DL — SIGNIFICANT CHANGE UP (ref 8.4–10.5)
CALCIUM SERPL-MCNC: 9.1 MG/DL — SIGNIFICANT CHANGE UP (ref 8.4–10.5)
CALCIUM SERPL-MCNC: 9.3 MG/DL — SIGNIFICANT CHANGE UP (ref 8.4–10.5)
CALCIUM SERPL-MCNC: 9.3 MG/DL — SIGNIFICANT CHANGE UP (ref 8.4–10.5)
CALCIUM SERPL-MCNC: 9.4 MG/DL — SIGNIFICANT CHANGE UP (ref 8.4–10.5)
CALCIUM SERPL-MCNC: 9.8 MG/DL — SIGNIFICANT CHANGE UP (ref 8.4–10.5)
CALCIUM SERPL-MCNC: 9.9 MG/DL — SIGNIFICANT CHANGE UP (ref 8.4–10.4)
CALCIUM SERPL-MCNC: 9.9 MG/DL — SIGNIFICANT CHANGE UP (ref 8.4–10.5)
CHLORIDE SERPL-SCNC: 104 MMOL/L — SIGNIFICANT CHANGE UP (ref 98–110)
CHLORIDE SERPL-SCNC: 105 MMOL/L — SIGNIFICANT CHANGE UP (ref 98–110)
CHLORIDE SERPL-SCNC: 107 MMOL/L — SIGNIFICANT CHANGE UP (ref 98–110)
CHLORIDE SERPL-SCNC: 108 MMOL/L — SIGNIFICANT CHANGE UP (ref 98–110)
CHLORIDE SERPL-SCNC: 108 MMOL/L — SIGNIFICANT CHANGE UP (ref 98–110)
CHLORIDE SERPL-SCNC: 109 MMOL/L — SIGNIFICANT CHANGE UP (ref 98–110)
CHLORIDE SERPL-SCNC: 110 MMOL/L — SIGNIFICANT CHANGE UP (ref 98–110)
CHLORIDE SERPL-SCNC: 110 MMOL/L — SIGNIFICANT CHANGE UP (ref 98–110)
CHLORIDE SERPL-SCNC: 111 MMOL/L — HIGH (ref 98–110)
CHLORIDE SERPL-SCNC: 111 MMOL/L — HIGH (ref 98–110)
CHLORIDE SERPL-SCNC: 112 MMOL/L — HIGH (ref 98–110)
CHLORIDE SERPL-SCNC: 112 MMOL/L — HIGH (ref 98–110)
CHLORIDE SERPL-SCNC: 113 MMOL/L — HIGH (ref 98–110)
CHLORIDE SERPL-SCNC: 114 MMOL/L — HIGH (ref 98–110)
CHLORIDE SERPL-SCNC: 114 MMOL/L — HIGH (ref 98–110)
CK SERPL-CCNC: 24 U/L — SIGNIFICANT CHANGE UP (ref 0–225)
CO2 SERPL-SCNC: 21 MMOL/L — SIGNIFICANT CHANGE UP (ref 17–32)
CO2 SERPL-SCNC: 21 MMOL/L — SIGNIFICANT CHANGE UP (ref 17–32)
CO2 SERPL-SCNC: 22 MMOL/L — SIGNIFICANT CHANGE UP (ref 17–32)
CO2 SERPL-SCNC: 23 MMOL/L — SIGNIFICANT CHANGE UP (ref 17–32)
CO2 SERPL-SCNC: 24 MMOL/L — SIGNIFICANT CHANGE UP (ref 17–32)
CO2 SERPL-SCNC: 25 MMOL/L — SIGNIFICANT CHANGE UP (ref 17–32)
CO2 SERPL-SCNC: 25 MMOL/L — SIGNIFICANT CHANGE UP (ref 17–32)
CO2 SERPL-SCNC: 26 MMOL/L — SIGNIFICANT CHANGE UP (ref 17–32)
CO2 SERPL-SCNC: 26 MMOL/L — SIGNIFICANT CHANGE UP (ref 17–32)
CREAT SERPL-MCNC: 0.9 MG/DL — SIGNIFICANT CHANGE UP (ref 0.7–1.5)
CREAT SERPL-MCNC: 0.9 MG/DL — SIGNIFICANT CHANGE UP (ref 0.7–1.5)
CREAT SERPL-MCNC: 1 MG/DL — SIGNIFICANT CHANGE UP (ref 0.7–1.5)
CREAT SERPL-MCNC: 1.1 MG/DL — SIGNIFICANT CHANGE UP (ref 0.7–1.5)
CREAT SERPL-MCNC: 1.2 MG/DL — SIGNIFICANT CHANGE UP (ref 0.7–1.5)
CULTURE RESULTS: SIGNIFICANT CHANGE UP
CULTURE RESULTS: SIGNIFICANT CHANGE UP
DIR ANTIGLOB POLYSPECIFIC INTERPRETATION: SIGNIFICANT CHANGE UP
EGFR: 46 ML/MIN/1.73M2 — LOW
EGFR: 51 ML/MIN/1.73M2 — LOW
EGFR: 58 ML/MIN/1.73M2 — LOW
EGFR: 65 ML/MIN/1.73M2 — SIGNIFICANT CHANGE UP
EGFR: 65 ML/MIN/1.73M2 — SIGNIFICANT CHANGE UP
EOSINOPHIL # BLD AUTO: 0.24 K/UL — SIGNIFICANT CHANGE UP (ref 0–0.7)
EOSINOPHIL # BLD AUTO: 0.35 K/UL — SIGNIFICANT CHANGE UP (ref 0–0.7)
EOSINOPHIL # BLD AUTO: 0.35 K/UL — SIGNIFICANT CHANGE UP (ref 0–0.7)
EOSINOPHIL # BLD AUTO: 0.37 K/UL — SIGNIFICANT CHANGE UP (ref 0–0.7)
EOSINOPHIL # BLD AUTO: 0.39 K/UL — SIGNIFICANT CHANGE UP (ref 0–0.7)
EOSINOPHIL # BLD AUTO: 0.42 K/UL — SIGNIFICANT CHANGE UP (ref 0–0.7)
EOSINOPHIL # BLD AUTO: 0.45 K/UL — SIGNIFICANT CHANGE UP (ref 0–0.7)
EOSINOPHIL # BLD AUTO: 0.45 K/UL — SIGNIFICANT CHANGE UP (ref 0–0.7)
EOSINOPHIL # BLD AUTO: 0.46 K/UL — SIGNIFICANT CHANGE UP (ref 0–0.7)
EOSINOPHIL # BLD AUTO: 0.48 K/UL — SIGNIFICANT CHANGE UP (ref 0–0.7)
EOSINOPHIL # BLD AUTO: 0.5 K/UL — SIGNIFICANT CHANGE UP (ref 0–0.7)
EOSINOPHIL # BLD AUTO: 0.5 K/UL — SIGNIFICANT CHANGE UP (ref 0–0.7)
EOSINOPHIL # BLD AUTO: 0.52 K/UL — SIGNIFICANT CHANGE UP (ref 0–0.7)
EOSINOPHIL # BLD AUTO: 0.54 K/UL — SIGNIFICANT CHANGE UP (ref 0–0.7)
EOSINOPHIL NFR BLD AUTO: 1.7 % — SIGNIFICANT CHANGE UP (ref 0–8)
EOSINOPHIL NFR BLD AUTO: 2.7 % — SIGNIFICANT CHANGE UP (ref 0–8)
EOSINOPHIL NFR BLD AUTO: 2.9 % — SIGNIFICANT CHANGE UP (ref 0–8)
EOSINOPHIL NFR BLD AUTO: 3.2 % — SIGNIFICANT CHANGE UP (ref 0–8)
EOSINOPHIL NFR BLD AUTO: 3.5 % — SIGNIFICANT CHANGE UP (ref 0–8)
EOSINOPHIL NFR BLD AUTO: 4 % — SIGNIFICANT CHANGE UP (ref 0–8)
EOSINOPHIL NFR BLD AUTO: 4.4 % — SIGNIFICANT CHANGE UP (ref 0–8)
EOSINOPHIL NFR BLD AUTO: 4.7 % — SIGNIFICANT CHANGE UP (ref 0–8)
EOSINOPHIL NFR BLD AUTO: 4.7 % — SIGNIFICANT CHANGE UP (ref 0–8)
EOSINOPHIL NFR BLD AUTO: 4.8 % — SIGNIFICANT CHANGE UP (ref 0–8)
FLUAV AG NPH QL: SIGNIFICANT CHANGE UP
FLUBV AG NPH QL: SIGNIFICANT CHANGE UP
GAS PNL BLDA: SIGNIFICANT CHANGE UP
GLUCOSE BLDC GLUCOMTR-MCNC: 103 MG/DL — HIGH (ref 70–99)
GLUCOSE BLDC GLUCOMTR-MCNC: 106 MG/DL — HIGH (ref 70–99)
GLUCOSE BLDC GLUCOMTR-MCNC: 107 MG/DL — HIGH (ref 70–99)
GLUCOSE BLDC GLUCOMTR-MCNC: 114 MG/DL — HIGH (ref 70–99)
GLUCOSE BLDC GLUCOMTR-MCNC: 117 MG/DL — HIGH (ref 70–99)
GLUCOSE BLDC GLUCOMTR-MCNC: 122 MG/DL — HIGH (ref 70–99)
GLUCOSE BLDC GLUCOMTR-MCNC: 123 MG/DL — HIGH (ref 70–99)
GLUCOSE BLDC GLUCOMTR-MCNC: 124 MG/DL — HIGH (ref 70–99)
GLUCOSE BLDC GLUCOMTR-MCNC: 125 MG/DL — HIGH (ref 70–99)
GLUCOSE BLDC GLUCOMTR-MCNC: 130 MG/DL — HIGH (ref 70–99)
GLUCOSE BLDC GLUCOMTR-MCNC: 136 MG/DL — HIGH (ref 70–99)
GLUCOSE BLDC GLUCOMTR-MCNC: 137 MG/DL — HIGH (ref 70–99)
GLUCOSE BLDC GLUCOMTR-MCNC: 137 MG/DL — HIGH (ref 70–99)
GLUCOSE BLDC GLUCOMTR-MCNC: 139 MG/DL — HIGH (ref 70–99)
GLUCOSE BLDC GLUCOMTR-MCNC: 143 MG/DL — HIGH (ref 70–99)
GLUCOSE BLDC GLUCOMTR-MCNC: 147 MG/DL — HIGH (ref 70–99)
GLUCOSE BLDC GLUCOMTR-MCNC: 156 MG/DL — HIGH (ref 70–99)
GLUCOSE BLDC GLUCOMTR-MCNC: 157 MG/DL — HIGH (ref 70–99)
GLUCOSE BLDC GLUCOMTR-MCNC: 158 MG/DL — HIGH (ref 70–99)
GLUCOSE BLDC GLUCOMTR-MCNC: 167 MG/DL — HIGH (ref 70–99)
GLUCOSE BLDC GLUCOMTR-MCNC: 174 MG/DL — HIGH (ref 70–99)
GLUCOSE BLDC GLUCOMTR-MCNC: 177 MG/DL — HIGH (ref 70–99)
GLUCOSE BLDC GLUCOMTR-MCNC: 179 MG/DL — HIGH (ref 70–99)
GLUCOSE BLDC GLUCOMTR-MCNC: 184 MG/DL — HIGH (ref 70–99)
GLUCOSE BLDC GLUCOMTR-MCNC: 184 MG/DL — HIGH (ref 70–99)
GLUCOSE BLDC GLUCOMTR-MCNC: 185 MG/DL — HIGH (ref 70–99)
GLUCOSE BLDC GLUCOMTR-MCNC: 190 MG/DL — HIGH (ref 70–99)
GLUCOSE BLDC GLUCOMTR-MCNC: 200 MG/DL — HIGH (ref 70–99)
GLUCOSE BLDC GLUCOMTR-MCNC: 203 MG/DL — HIGH (ref 70–99)
GLUCOSE BLDC GLUCOMTR-MCNC: 204 MG/DL — HIGH (ref 70–99)
GLUCOSE BLDC GLUCOMTR-MCNC: 206 MG/DL — HIGH (ref 70–99)
GLUCOSE BLDC GLUCOMTR-MCNC: 208 MG/DL — HIGH (ref 70–99)
GLUCOSE BLDC GLUCOMTR-MCNC: 216 MG/DL — HIGH (ref 70–99)
GLUCOSE BLDC GLUCOMTR-MCNC: 218 MG/DL — HIGH (ref 70–99)
GLUCOSE BLDC GLUCOMTR-MCNC: 226 MG/DL — HIGH (ref 70–99)
GLUCOSE BLDC GLUCOMTR-MCNC: 242 MG/DL — HIGH (ref 70–99)
GLUCOSE BLDC GLUCOMTR-MCNC: 258 MG/DL — HIGH (ref 70–99)
GLUCOSE BLDC GLUCOMTR-MCNC: 279 MG/DL — HIGH (ref 70–99)
GLUCOSE BLDC GLUCOMTR-MCNC: 281 MG/DL — HIGH (ref 70–99)
GLUCOSE BLDC GLUCOMTR-MCNC: 290 MG/DL — HIGH (ref 70–99)
GLUCOSE BLDC GLUCOMTR-MCNC: 291 MG/DL — HIGH (ref 70–99)
GLUCOSE BLDC GLUCOMTR-MCNC: 41 MG/DL — CRITICAL LOW (ref 70–99)
GLUCOSE BLDC GLUCOMTR-MCNC: 42 MG/DL — CRITICAL LOW (ref 70–99)
GLUCOSE BLDC GLUCOMTR-MCNC: 48 MG/DL — CRITICAL LOW (ref 70–99)
GLUCOSE BLDC GLUCOMTR-MCNC: 49 MG/DL — CRITICAL LOW (ref 70–99)
GLUCOSE BLDC GLUCOMTR-MCNC: 80 MG/DL — SIGNIFICANT CHANGE UP (ref 70–99)
GLUCOSE BLDC GLUCOMTR-MCNC: 82 MG/DL — SIGNIFICANT CHANGE UP (ref 70–99)
GLUCOSE BLDC GLUCOMTR-MCNC: 85 MG/DL — SIGNIFICANT CHANGE UP (ref 70–99)
GLUCOSE BLDC GLUCOMTR-MCNC: 85 MG/DL — SIGNIFICANT CHANGE UP (ref 70–99)
GLUCOSE BLDC GLUCOMTR-MCNC: 93 MG/DL — SIGNIFICANT CHANGE UP (ref 70–99)
GLUCOSE BLDC GLUCOMTR-MCNC: 98 MG/DL — SIGNIFICANT CHANGE UP (ref 70–99)
GLUCOSE BLDC GLUCOMTR-MCNC: 98 MG/DL — SIGNIFICANT CHANGE UP (ref 70–99)
GLUCOSE SERPL-MCNC: 113 MG/DL — HIGH (ref 70–99)
GLUCOSE SERPL-MCNC: 131 MG/DL — HIGH (ref 70–99)
GLUCOSE SERPL-MCNC: 147 MG/DL — HIGH (ref 70–99)
GLUCOSE SERPL-MCNC: 159 MG/DL — HIGH (ref 70–99)
GLUCOSE SERPL-MCNC: 166 MG/DL — HIGH (ref 70–99)
GLUCOSE SERPL-MCNC: 184 MG/DL — HIGH (ref 70–99)
GLUCOSE SERPL-MCNC: 191 MG/DL — HIGH (ref 70–99)
GLUCOSE SERPL-MCNC: 216 MG/DL — HIGH (ref 70–99)
GLUCOSE SERPL-MCNC: 219 MG/DL — HIGH (ref 70–99)
GLUCOSE SERPL-MCNC: 249 MG/DL — HIGH (ref 70–99)
GLUCOSE SERPL-MCNC: 249 MG/DL — HIGH (ref 70–99)
GLUCOSE SERPL-MCNC: 263 MG/DL — HIGH (ref 70–99)
GLUCOSE SERPL-MCNC: 291 MG/DL — HIGH (ref 70–99)
GLUCOSE SERPL-MCNC: 298 MG/DL — HIGH (ref 70–99)
GLUCOSE SERPL-MCNC: 60 MG/DL — LOW (ref 70–99)
GLUCOSE SERPL-MCNC: 86 MG/DL — SIGNIFICANT CHANGE UP (ref 70–99)
GLUCOSE SERPL-MCNC: 98 MG/DL — SIGNIFICANT CHANGE UP (ref 70–99)
HAPTOGLOB SERPL-MCNC: 136 MG/DL — SIGNIFICANT CHANGE UP (ref 34–200)
HCT VFR BLD CALC: 22.4 % — LOW (ref 37–47)
HCT VFR BLD CALC: 23.9 % — LOW (ref 37–47)
HCT VFR BLD CALC: 25 % — LOW (ref 37–47)
HCT VFR BLD CALC: 26 % — LOW (ref 37–47)
HCT VFR BLD CALC: 26.7 % — LOW (ref 37–47)
HCT VFR BLD CALC: 27.4 % — LOW (ref 37–47)
HCT VFR BLD CALC: 27.5 % — LOW (ref 37–47)
HCT VFR BLD CALC: 27.9 % — LOW (ref 37–47)
HCT VFR BLD CALC: 28.1 % — LOW (ref 37–47)
HCT VFR BLD CALC: 28.4 % — LOW (ref 37–47)
HCT VFR BLD CALC: 28.5 % — LOW (ref 37–47)
HCT VFR BLD CALC: 29 % — LOW (ref 37–47)
HCT VFR BLD CALC: 30.1 % — LOW (ref 37–47)
HCT VFR BLD CALC: 30.1 % — LOW (ref 37–47)
HGB BLD-MCNC: 7.1 G/DL — LOW (ref 12–16)
HGB BLD-MCNC: 7.7 G/DL — LOW (ref 12–16)
HGB BLD-MCNC: 8.2 G/DL — LOW (ref 12–16)
HGB BLD-MCNC: 8.2 G/DL — LOW (ref 12–16)
HGB BLD-MCNC: 8.3 G/DL — LOW (ref 12–16)
HGB BLD-MCNC: 8.8 G/DL — LOW (ref 12–16)
HGB BLD-MCNC: 8.9 G/DL — LOW (ref 12–16)
HGB BLD-MCNC: 8.9 G/DL — LOW (ref 12–16)
HGB BLD-MCNC: 9.1 G/DL — LOW (ref 12–16)
HGB BLD-MCNC: 9.3 G/DL — LOW (ref 12–16)
HGB BLD-MCNC: 9.3 G/DL — LOW (ref 12–16)
HGB BLD-MCNC: 9.4 G/DL — LOW (ref 12–16)
HGB BLD-MCNC: 9.7 G/DL — LOW (ref 12–16)
HGB BLD-MCNC: 9.8 G/DL — LOW (ref 12–16)
IMM GRANULOCYTES NFR BLD AUTO: 0.4 % — HIGH (ref 0.1–0.3)
IMM GRANULOCYTES NFR BLD AUTO: 0.5 % — HIGH (ref 0.1–0.3)
IMM GRANULOCYTES NFR BLD AUTO: 0.6 % — HIGH (ref 0.1–0.3)
IMM GRANULOCYTES NFR BLD AUTO: 0.8 % — HIGH (ref 0.1–0.3)
IMM GRANULOCYTES NFR BLD AUTO: 1 % — HIGH (ref 0.1–0.3)
LACTATE SERPL-SCNC: 1.5 MMOL/L — SIGNIFICANT CHANGE UP (ref 0.7–2)
LACTATE SERPL-SCNC: 2.2 MMOL/L — HIGH (ref 0.7–2)
LDH SERPL L TO P-CCNC: 276 — HIGH (ref 50–242)
LYMPHOCYTES # BLD AUTO: 1.04 K/UL — LOW (ref 1.2–3.4)
LYMPHOCYTES # BLD AUTO: 1.32 K/UL — SIGNIFICANT CHANGE UP (ref 1.2–3.4)
LYMPHOCYTES # BLD AUTO: 1.37 K/UL — SIGNIFICANT CHANGE UP (ref 1.2–3.4)
LYMPHOCYTES # BLD AUTO: 1.46 K/UL — SIGNIFICANT CHANGE UP (ref 1.2–3.4)
LYMPHOCYTES # BLD AUTO: 1.51 K/UL — SIGNIFICANT CHANGE UP (ref 1.2–3.4)
LYMPHOCYTES # BLD AUTO: 1.52 K/UL — SIGNIFICANT CHANGE UP (ref 1.2–3.4)
LYMPHOCYTES # BLD AUTO: 1.55 K/UL — SIGNIFICANT CHANGE UP (ref 1.2–3.4)
LYMPHOCYTES # BLD AUTO: 1.72 K/UL — SIGNIFICANT CHANGE UP (ref 1.2–3.4)
LYMPHOCYTES # BLD AUTO: 1.87 K/UL — SIGNIFICANT CHANGE UP (ref 1.2–3.4)
LYMPHOCYTES # BLD AUTO: 1.91 K/UL — SIGNIFICANT CHANGE UP (ref 1.2–3.4)
LYMPHOCYTES # BLD AUTO: 1.92 K/UL — SIGNIFICANT CHANGE UP (ref 1.2–3.4)
LYMPHOCYTES # BLD AUTO: 10.8 % — LOW (ref 20.5–51.1)
LYMPHOCYTES # BLD AUTO: 11.3 % — LOW (ref 20.5–51.1)
LYMPHOCYTES # BLD AUTO: 12.8 % — LOW (ref 20.5–51.1)
LYMPHOCYTES # BLD AUTO: 13.7 % — LOW (ref 20.5–51.1)
LYMPHOCYTES # BLD AUTO: 13.8 % — LOW (ref 20.5–51.1)
LYMPHOCYTES # BLD AUTO: 13.8 % — LOW (ref 20.5–51.1)
LYMPHOCYTES # BLD AUTO: 14.5 % — LOW (ref 20.5–51.1)
LYMPHOCYTES # BLD AUTO: 14.5 % — LOW (ref 20.5–51.1)
LYMPHOCYTES # BLD AUTO: 15.6 % — LOW (ref 20.5–51.1)
LYMPHOCYTES # BLD AUTO: 15.6 % — LOW (ref 20.5–51.1)
LYMPHOCYTES # BLD AUTO: 17.1 % — LOW (ref 20.5–51.1)
LYMPHOCYTES # BLD AUTO: 17.5 % — LOW (ref 20.5–51.1)
LYMPHOCYTES # BLD AUTO: 18.5 % — LOW (ref 20.5–51.1)
LYMPHOCYTES # BLD AUTO: 2.01 K/UL — SIGNIFICANT CHANGE UP (ref 1.2–3.4)
LYMPHOCYTES # BLD AUTO: 2.43 K/UL — SIGNIFICANT CHANGE UP (ref 1.2–3.4)
LYMPHOCYTES # BLD AUTO: 2.46 K/UL — SIGNIFICANT CHANGE UP (ref 1.2–3.4)
LYMPHOCYTES # BLD AUTO: 9.7 % — LOW (ref 20.5–51.1)
MAGNESIUM SERPL-MCNC: 1.7 MG/DL — LOW (ref 1.8–2.4)
MAGNESIUM SERPL-MCNC: 1.8 MG/DL — SIGNIFICANT CHANGE UP (ref 1.8–2.4)
MAGNESIUM SERPL-MCNC: 1.9 MG/DL — SIGNIFICANT CHANGE UP (ref 1.8–2.4)
MAGNESIUM SERPL-MCNC: 1.9 MG/DL — SIGNIFICANT CHANGE UP (ref 1.8–2.4)
MAGNESIUM SERPL-MCNC: 2 MG/DL — SIGNIFICANT CHANGE UP (ref 1.8–2.4)
MCHC RBC-ENTMCNC: 28.3 PG — SIGNIFICANT CHANGE UP (ref 27–31)
MCHC RBC-ENTMCNC: 28.4 PG — SIGNIFICANT CHANGE UP (ref 27–31)
MCHC RBC-ENTMCNC: 28.5 PG — SIGNIFICANT CHANGE UP (ref 27–31)
MCHC RBC-ENTMCNC: 28.5 PG — SIGNIFICANT CHANGE UP (ref 27–31)
MCHC RBC-ENTMCNC: 28.6 PG — SIGNIFICANT CHANGE UP (ref 27–31)
MCHC RBC-ENTMCNC: 28.7 PG — SIGNIFICANT CHANGE UP (ref 27–31)
MCHC RBC-ENTMCNC: 28.7 PG — SIGNIFICANT CHANGE UP (ref 27–31)
MCHC RBC-ENTMCNC: 28.8 PG — SIGNIFICANT CHANGE UP (ref 27–31)
MCHC RBC-ENTMCNC: 28.9 PG — SIGNIFICANT CHANGE UP (ref 27–31)
MCHC RBC-ENTMCNC: 28.9 PG — SIGNIFICANT CHANGE UP (ref 27–31)
MCHC RBC-ENTMCNC: 29 PG — SIGNIFICANT CHANGE UP (ref 27–31)
MCHC RBC-ENTMCNC: 31.1 G/DL — LOW (ref 32–37)
MCHC RBC-ENTMCNC: 31.4 G/DL — LOW (ref 32–37)
MCHC RBC-ENTMCNC: 31.5 G/DL — LOW (ref 32–37)
MCHC RBC-ENTMCNC: 31.7 G/DL — LOW (ref 32–37)
MCHC RBC-ENTMCNC: 31.9 G/DL — LOW (ref 32–37)
MCHC RBC-ENTMCNC: 32 G/DL — SIGNIFICANT CHANGE UP (ref 32–37)
MCHC RBC-ENTMCNC: 32.2 G/DL — SIGNIFICANT CHANGE UP (ref 32–37)
MCHC RBC-ENTMCNC: 32.2 G/DL — SIGNIFICANT CHANGE UP (ref 32–37)
MCHC RBC-ENTMCNC: 32.5 G/DL — SIGNIFICANT CHANGE UP (ref 32–37)
MCHC RBC-ENTMCNC: 32.6 G/DL — SIGNIFICANT CHANGE UP (ref 32–37)
MCHC RBC-ENTMCNC: 32.6 G/DL — SIGNIFICANT CHANGE UP (ref 32–37)
MCHC RBC-ENTMCNC: 32.8 G/DL — SIGNIFICANT CHANGE UP (ref 32–37)
MCHC RBC-ENTMCNC: 33.1 G/DL — SIGNIFICANT CHANGE UP (ref 32–37)
MCHC RBC-ENTMCNC: 33.1 G/DL — SIGNIFICANT CHANGE UP (ref 32–37)
MCV RBC AUTO: 86.2 FL — SIGNIFICANT CHANGE UP (ref 81–99)
MCV RBC AUTO: 86.6 FL — SIGNIFICANT CHANGE UP (ref 81–99)
MCV RBC AUTO: 87.2 FL — SIGNIFICANT CHANGE UP (ref 81–99)
MCV RBC AUTO: 87.4 FL — SIGNIFICANT CHANGE UP (ref 81–99)
MCV RBC AUTO: 88 FL — SIGNIFICANT CHANGE UP (ref 81–99)
MCV RBC AUTO: 88.4 FL — SIGNIFICANT CHANGE UP (ref 81–99)
MCV RBC AUTO: 89.5 FL — SIGNIFICANT CHANGE UP (ref 81–99)
MCV RBC AUTO: 89.6 FL — SIGNIFICANT CHANGE UP (ref 81–99)
MCV RBC AUTO: 89.7 FL — SIGNIFICANT CHANGE UP (ref 81–99)
MCV RBC AUTO: 89.9 FL — SIGNIFICANT CHANGE UP (ref 81–99)
MCV RBC AUTO: 90 FL — SIGNIFICANT CHANGE UP (ref 81–99)
MCV RBC AUTO: 90.3 FL — SIGNIFICANT CHANGE UP (ref 81–99)
MCV RBC AUTO: 90.5 FL — SIGNIFICANT CHANGE UP (ref 81–99)
MCV RBC AUTO: 91.4 FL — SIGNIFICANT CHANGE UP (ref 81–99)
MONOCYTES # BLD AUTO: 0.57 K/UL — SIGNIFICANT CHANGE UP (ref 0.1–0.6)
MONOCYTES # BLD AUTO: 1.01 K/UL — HIGH (ref 0.1–0.6)
MONOCYTES # BLD AUTO: 1.03 K/UL — HIGH (ref 0.1–0.6)
MONOCYTES # BLD AUTO: 1.19 K/UL — HIGH (ref 0.1–0.6)
MONOCYTES # BLD AUTO: 1.22 K/UL — HIGH (ref 0.1–0.6)
MONOCYTES # BLD AUTO: 1.3 K/UL — HIGH (ref 0.1–0.6)
MONOCYTES # BLD AUTO: 1.31 K/UL — HIGH (ref 0.1–0.6)
MONOCYTES # BLD AUTO: 1.37 K/UL — HIGH (ref 0.1–0.6)
MONOCYTES # BLD AUTO: 1.41 K/UL — HIGH (ref 0.1–0.6)
MONOCYTES # BLD AUTO: 1.48 K/UL — HIGH (ref 0.1–0.6)
MONOCYTES # BLD AUTO: 1.6 K/UL — HIGH (ref 0.1–0.6)
MONOCYTES # BLD AUTO: 1.66 K/UL — HIGH (ref 0.1–0.6)
MONOCYTES NFR BLD AUTO: 10.2 % — HIGH (ref 1.7–9.3)
MONOCYTES NFR BLD AUTO: 10.6 % — HIGH (ref 1.7–9.3)
MONOCYTES NFR BLD AUTO: 10.6 % — HIGH (ref 1.7–9.3)
MONOCYTES NFR BLD AUTO: 11.3 % — HIGH (ref 1.7–9.3)
MONOCYTES NFR BLD AUTO: 11.3 % — HIGH (ref 1.7–9.3)
MONOCYTES NFR BLD AUTO: 11.6 % — HIGH (ref 1.7–9.3)
MONOCYTES NFR BLD AUTO: 11.6 % — HIGH (ref 1.7–9.3)
MONOCYTES NFR BLD AUTO: 11.8 % — HIGH (ref 1.7–9.3)
MONOCYTES NFR BLD AUTO: 15.3 % — HIGH (ref 1.7–9.3)
MONOCYTES NFR BLD AUTO: 5.3 % — SIGNIFICANT CHANGE UP (ref 1.7–9.3)
MONOCYTES NFR BLD AUTO: 8.9 % — SIGNIFICANT CHANGE UP (ref 1.7–9.3)
MONOCYTES NFR BLD AUTO: 9.5 % — HIGH (ref 1.7–9.3)
MONOCYTES NFR BLD AUTO: 9.7 % — HIGH (ref 1.7–9.3)
MONOCYTES NFR BLD AUTO: 9.7 % — HIGH (ref 1.7–9.3)
MRSA PCR RESULT.: NEGATIVE — SIGNIFICANT CHANGE UP
NEUTROPHILS # BLD AUTO: 11.29 K/UL — HIGH (ref 1.4–6.5)
NEUTROPHILS # BLD AUTO: 6.57 K/UL — HIGH (ref 1.4–6.5)
NEUTROPHILS # BLD AUTO: 6.63 K/UL — HIGH (ref 1.4–6.5)
NEUTROPHILS # BLD AUTO: 7.04 K/UL — HIGH (ref 1.4–6.5)
NEUTROPHILS # BLD AUTO: 7.06 K/UL — HIGH (ref 1.4–6.5)
NEUTROPHILS # BLD AUTO: 7.46 K/UL — HIGH (ref 1.4–6.5)
NEUTROPHILS # BLD AUTO: 7.75 K/UL — HIGH (ref 1.4–6.5)
NEUTROPHILS # BLD AUTO: 8.05 K/UL — HIGH (ref 1.4–6.5)
NEUTROPHILS # BLD AUTO: 8.89 K/UL — HIGH (ref 1.4–6.5)
NEUTROPHILS # BLD AUTO: 8.99 K/UL — HIGH (ref 1.4–6.5)
NEUTROPHILS # BLD AUTO: 9.2 K/UL — HIGH (ref 1.4–6.5)
NEUTROPHILS # BLD AUTO: 9.68 K/UL — HIGH (ref 1.4–6.5)
NEUTROPHILS # BLD AUTO: 9.73 K/UL — HIGH (ref 1.4–6.5)
NEUTROPHILS # BLD AUTO: 9.87 K/UL — HIGH (ref 1.4–6.5)
NEUTROPHILS NFR BLD AUTO: 60.9 % — SIGNIFICANT CHANGE UP (ref 42.2–75.2)
NEUTROPHILS NFR BLD AUTO: 64 % — SIGNIFICANT CHANGE UP (ref 42.2–75.2)
NEUTROPHILS NFR BLD AUTO: 67.2 % — SIGNIFICANT CHANGE UP (ref 42.2–75.2)
NEUTROPHILS NFR BLD AUTO: 67.7 % — SIGNIFICANT CHANGE UP (ref 42.2–75.2)
NEUTROPHILS NFR BLD AUTO: 68.2 % — SIGNIFICANT CHANGE UP (ref 42.2–75.2)
NEUTROPHILS NFR BLD AUTO: 68.8 % — SIGNIFICANT CHANGE UP (ref 42.2–75.2)
NEUTROPHILS NFR BLD AUTO: 69.6 % — SIGNIFICANT CHANGE UP (ref 42.2–75.2)
NEUTROPHILS NFR BLD AUTO: 70.6 % — SIGNIFICANT CHANGE UP (ref 42.2–75.2)
NEUTROPHILS NFR BLD AUTO: 71.2 % — SIGNIFICANT CHANGE UP (ref 42.2–75.2)
NEUTROPHILS NFR BLD AUTO: 71.4 % — SIGNIFICANT CHANGE UP (ref 42.2–75.2)
NEUTROPHILS NFR BLD AUTO: 72.1 % — SIGNIFICANT CHANGE UP (ref 42.2–75.2)
NEUTROPHILS NFR BLD AUTO: 73 % — SIGNIFICANT CHANGE UP (ref 42.2–75.2)
NEUTROPHILS NFR BLD AUTO: 75.2 % — SIGNIFICANT CHANGE UP (ref 42.2–75.2)
NEUTROPHILS NFR BLD AUTO: 75.4 % — HIGH (ref 42.2–75.2)
NRBC # BLD: 0 /100 WBCS — SIGNIFICANT CHANGE UP (ref 0–0)
NT-PROBNP SERPL-SCNC: 9727 PG/ML — HIGH (ref 0–300)
PHOSPHATE SERPL-MCNC: 4.9 MG/DL — SIGNIFICANT CHANGE UP (ref 2.1–4.9)
PLATELET # BLD AUTO: 294 K/UL — SIGNIFICANT CHANGE UP (ref 130–400)
PLATELET # BLD AUTO: 299 K/UL — SIGNIFICANT CHANGE UP (ref 130–400)
PLATELET # BLD AUTO: 359 K/UL — SIGNIFICANT CHANGE UP (ref 130–400)
PLATELET # BLD AUTO: 369 K/UL — SIGNIFICANT CHANGE UP (ref 130–400)
PLATELET # BLD AUTO: 376 K/UL — SIGNIFICANT CHANGE UP (ref 130–400)
PLATELET # BLD AUTO: 379 K/UL — SIGNIFICANT CHANGE UP (ref 130–400)
PLATELET # BLD AUTO: 383 K/UL — SIGNIFICANT CHANGE UP (ref 130–400)
PLATELET # BLD AUTO: 384 K/UL — SIGNIFICANT CHANGE UP (ref 130–400)
PLATELET # BLD AUTO: 388 K/UL — SIGNIFICANT CHANGE UP (ref 130–400)
PLATELET # BLD AUTO: 406 K/UL — HIGH (ref 130–400)
PLATELET # BLD AUTO: 412 K/UL — HIGH (ref 130–400)
PLATELET # BLD AUTO: 414 K/UL — HIGH (ref 130–400)
PLATELET # BLD AUTO: 425 K/UL — HIGH (ref 130–400)
PLATELET # BLD AUTO: 429 K/UL — HIGH (ref 130–400)
PMV BLD: 10.6 FL — HIGH (ref 7.4–10.4)
PMV BLD: 10.7 FL — HIGH (ref 7.4–10.4)
PMV BLD: 11 FL — HIGH (ref 7.4–10.4)
PMV BLD: 11.1 FL — HIGH (ref 7.4–10.4)
PMV BLD: 11.3 FL — HIGH (ref 7.4–10.4)
PMV BLD: 11.3 FL — HIGH (ref 7.4–10.4)
PMV BLD: 11.4 FL — HIGH (ref 7.4–10.4)
PMV BLD: 11.5 FL — HIGH (ref 7.4–10.4)
PMV BLD: 11.6 FL — HIGH (ref 7.4–10.4)
PMV BLD: 11.6 FL — HIGH (ref 7.4–10.4)
PMV BLD: 11.8 FL — HIGH (ref 7.4–10.4)
PMV BLD: 11.9 FL — HIGH (ref 7.4–10.4)
POTASSIUM SERPL-MCNC: 3.2 MMOL/L — LOW (ref 3.5–5)
POTASSIUM SERPL-MCNC: 3.3 MMOL/L — LOW (ref 3.5–5)
POTASSIUM SERPL-MCNC: 3.7 MMOL/L — SIGNIFICANT CHANGE UP (ref 3.5–5)
POTASSIUM SERPL-MCNC: 3.8 MMOL/L — SIGNIFICANT CHANGE UP (ref 3.5–5)
POTASSIUM SERPL-MCNC: 3.8 MMOL/L — SIGNIFICANT CHANGE UP (ref 3.5–5)
POTASSIUM SERPL-MCNC: 3.9 MMOL/L — SIGNIFICANT CHANGE UP (ref 3.5–5)
POTASSIUM SERPL-MCNC: 3.9 MMOL/L — SIGNIFICANT CHANGE UP (ref 3.5–5)
POTASSIUM SERPL-MCNC: 4 MMOL/L — SIGNIFICANT CHANGE UP (ref 3.5–5)
POTASSIUM SERPL-MCNC: 4 MMOL/L — SIGNIFICANT CHANGE UP (ref 3.5–5)
POTASSIUM SERPL-MCNC: 4.2 MMOL/L — SIGNIFICANT CHANGE UP (ref 3.5–5)
POTASSIUM SERPL-MCNC: 4.6 MMOL/L — SIGNIFICANT CHANGE UP (ref 3.5–5)
POTASSIUM SERPL-MCNC: 4.7 MMOL/L — SIGNIFICANT CHANGE UP (ref 3.5–5)
POTASSIUM SERPL-MCNC: 5 MMOL/L — SIGNIFICANT CHANGE UP (ref 3.5–5)
POTASSIUM SERPL-MCNC: 5.1 MMOL/L — HIGH (ref 3.5–5)
POTASSIUM SERPL-MCNC: 5.1 MMOL/L — HIGH (ref 3.5–5)
POTASSIUM SERPL-MCNC: 6.6 MMOL/L — CRITICAL HIGH (ref 3.5–5)
POTASSIUM SERPL-MCNC: 6.8 MMOL/L — CRITICAL HIGH (ref 3.5–5)
POTASSIUM SERPL-SCNC: 3.2 MMOL/L — LOW (ref 3.5–5)
POTASSIUM SERPL-SCNC: 3.3 MMOL/L — LOW (ref 3.5–5)
POTASSIUM SERPL-SCNC: 3.7 MMOL/L — SIGNIFICANT CHANGE UP (ref 3.5–5)
POTASSIUM SERPL-SCNC: 3.8 MMOL/L — SIGNIFICANT CHANGE UP (ref 3.5–5)
POTASSIUM SERPL-SCNC: 3.8 MMOL/L — SIGNIFICANT CHANGE UP (ref 3.5–5)
POTASSIUM SERPL-SCNC: 3.9 MMOL/L — SIGNIFICANT CHANGE UP (ref 3.5–5)
POTASSIUM SERPL-SCNC: 3.9 MMOL/L — SIGNIFICANT CHANGE UP (ref 3.5–5)
POTASSIUM SERPL-SCNC: 4 MMOL/L — SIGNIFICANT CHANGE UP (ref 3.5–5)
POTASSIUM SERPL-SCNC: 4 MMOL/L — SIGNIFICANT CHANGE UP (ref 3.5–5)
POTASSIUM SERPL-SCNC: 4.2 MMOL/L — SIGNIFICANT CHANGE UP (ref 3.5–5)
POTASSIUM SERPL-SCNC: 4.6 MMOL/L — SIGNIFICANT CHANGE UP (ref 3.5–5)
POTASSIUM SERPL-SCNC: 4.7 MMOL/L — SIGNIFICANT CHANGE UP (ref 3.5–5)
POTASSIUM SERPL-SCNC: 5 MMOL/L — SIGNIFICANT CHANGE UP (ref 3.5–5)
POTASSIUM SERPL-SCNC: 5.1 MMOL/L — HIGH (ref 3.5–5)
POTASSIUM SERPL-SCNC: 5.1 MMOL/L — HIGH (ref 3.5–5)
POTASSIUM SERPL-SCNC: 6.6 MMOL/L — CRITICAL HIGH (ref 3.5–5)
POTASSIUM SERPL-SCNC: 6.8 MMOL/L — CRITICAL HIGH (ref 3.5–5)
PROCALCITONIN SERPL-MCNC: 0.65 NG/ML — HIGH (ref 0.02–0.1)
PROT SERPL-MCNC: 4.5 G/DL — LOW (ref 6–8)
PROT SERPL-MCNC: 4.7 G/DL — LOW (ref 6–8)
PROT SERPL-MCNC: 4.7 G/DL — LOW (ref 6–8)
PROT SERPL-MCNC: 4.8 G/DL — LOW (ref 6–8)
PROT SERPL-MCNC: 4.9 G/DL — LOW (ref 6–8)
PROT SERPL-MCNC: 4.9 G/DL — LOW (ref 6–8)
PROT SERPL-MCNC: 5 G/DL — LOW (ref 6–8)
PROT SERPL-MCNC: 5.1 G/DL — LOW (ref 6–8)
PROT SERPL-MCNC: 5.5 G/DL — LOW (ref 6–8)
RBC # BLD: 2.5 M/UL — LOW (ref 4.2–5.4)
RBC # BLD: 2.67 M/UL — LOW (ref 4.2–5.4)
RBC # BLD: 2.84 M/UL — LOW (ref 4.2–5.4)
RBC # BLD: 2.89 M/UL — LOW (ref 4.2–5.4)
RBC # BLD: 2.92 M/UL — LOW (ref 4.2–5.4)
RBC # BLD: 3.04 M/UL — LOW (ref 4.2–5.4)
RBC # BLD: 3.1 M/UL — LOW (ref 4.2–5.4)
RBC # BLD: 3.11 M/UL — LOW (ref 4.2–5.4)
RBC # BLD: 3.21 M/UL — LOW (ref 4.2–5.4)
RBC # BLD: 3.26 M/UL — LOW (ref 4.2–5.4)
RBC # BLD: 3.26 M/UL — LOW (ref 4.2–5.4)
RBC # BLD: 3.28 M/UL — LOW (ref 4.2–5.4)
RBC # BLD: 3.35 M/UL — LOW (ref 4.2–5.4)
RBC # BLD: 3.35 M/UL — LOW (ref 4.2–5.4)
RBC # BLD: 3.45 M/UL — LOW (ref 4.2–5.4)
RBC # FLD: 16.8 % — HIGH (ref 11.5–14.5)
RBC # FLD: 17.5 % — HIGH (ref 11.5–14.5)
RBC # FLD: 17.5 % — HIGH (ref 11.5–14.5)
RBC # FLD: 17.6 % — HIGH (ref 11.5–14.5)
RBC # FLD: 17.8 % — HIGH (ref 11.5–14.5)
RBC # FLD: 18 % — HIGH (ref 11.5–14.5)
RBC # FLD: 18.1 % — HIGH (ref 11.5–14.5)
RBC # FLD: 18.2 % — HIGH (ref 11.5–14.5)
RBC # FLD: 18.3 % — HIGH (ref 11.5–14.5)
RBC # FLD: 18.3 % — HIGH (ref 11.5–14.5)
RBC # FLD: 19.1 % — HIGH (ref 11.5–14.5)
RBC # FLD: 19.6 % — HIGH (ref 11.5–14.5)
RETICS #: 23.1 K/UL — LOW (ref 25–125)
RETICS/RBC NFR: 0.7 % — SIGNIFICANT CHANGE UP (ref 0.5–1.5)
RSV RNA NPH QL NAA+NON-PROBE: SIGNIFICANT CHANGE UP
SARS-COV-2 RNA SPEC QL NAA+PROBE: SIGNIFICANT CHANGE UP
SODIUM SERPL-SCNC: 140 MMOL/L — SIGNIFICANT CHANGE UP (ref 135–146)
SODIUM SERPL-SCNC: 141 MMOL/L — SIGNIFICANT CHANGE UP (ref 135–146)
SODIUM SERPL-SCNC: 142 MMOL/L — SIGNIFICANT CHANGE UP (ref 135–146)
SODIUM SERPL-SCNC: 143 MMOL/L — SIGNIFICANT CHANGE UP (ref 135–146)
SODIUM SERPL-SCNC: 143 MMOL/L — SIGNIFICANT CHANGE UP (ref 135–146)
SODIUM SERPL-SCNC: 145 MMOL/L — SIGNIFICANT CHANGE UP (ref 135–146)
SODIUM SERPL-SCNC: 146 MMOL/L — SIGNIFICANT CHANGE UP (ref 135–146)
SODIUM SERPL-SCNC: 148 MMOL/L — HIGH (ref 135–146)
SODIUM SERPL-SCNC: 149 MMOL/L — HIGH (ref 135–146)
SODIUM SERPL-SCNC: 150 MMOL/L — HIGH (ref 135–146)
SPECIMEN SOURCE: SIGNIFICANT CHANGE UP
SPECIMEN SOURCE: SIGNIFICANT CHANGE UP
WBC # BLD: 10.5 K/UL — SIGNIFICANT CHANGE UP (ref 4.8–10.8)
WBC # BLD: 10.58 K/UL — SIGNIFICANT CHANGE UP (ref 4.8–10.8)
WBC # BLD: 10.68 K/UL — SIGNIFICANT CHANGE UP (ref 4.8–10.8)
WBC # BLD: 10.87 K/UL — HIGH (ref 4.8–10.8)
WBC # BLD: 10.99 K/UL — HIGH (ref 4.8–10.8)
WBC # BLD: 11.26 K/UL — HIGH (ref 4.8–10.8)
WBC # BLD: 12.17 K/UL — HIGH (ref 4.8–10.8)
WBC # BLD: 12.23 K/UL — HIGH (ref 4.8–10.8)
WBC # BLD: 12.9 K/UL — HIGH (ref 4.8–10.8)
WBC # BLD: 13.49 K/UL — HIGH (ref 4.8–10.8)
WBC # BLD: 13.9 K/UL — HIGH (ref 4.8–10.8)
WBC # BLD: 14.2 K/UL — HIGH (ref 4.8–10.8)
WBC # BLD: 15.81 K/UL — HIGH (ref 4.8–10.8)
WBC # BLD: 9.71 K/UL — SIGNIFICANT CHANGE UP (ref 4.8–10.8)
WBC # FLD AUTO: 10.5 K/UL — SIGNIFICANT CHANGE UP (ref 4.8–10.8)
WBC # FLD AUTO: 10.58 K/UL — SIGNIFICANT CHANGE UP (ref 4.8–10.8)
WBC # FLD AUTO: 10.68 K/UL — SIGNIFICANT CHANGE UP (ref 4.8–10.8)
WBC # FLD AUTO: 10.87 K/UL — HIGH (ref 4.8–10.8)
WBC # FLD AUTO: 10.99 K/UL — HIGH (ref 4.8–10.8)
WBC # FLD AUTO: 11.26 K/UL — HIGH (ref 4.8–10.8)
WBC # FLD AUTO: 12.17 K/UL — HIGH (ref 4.8–10.8)
WBC # FLD AUTO: 12.23 K/UL — HIGH (ref 4.8–10.8)
WBC # FLD AUTO: 12.9 K/UL — HIGH (ref 4.8–10.8)
WBC # FLD AUTO: 13.49 K/UL — HIGH (ref 4.8–10.8)
WBC # FLD AUTO: 13.9 K/UL — HIGH (ref 4.8–10.8)
WBC # FLD AUTO: 14.2 K/UL — HIGH (ref 4.8–10.8)
WBC # FLD AUTO: 15.81 K/UL — HIGH (ref 4.8–10.8)
WBC # FLD AUTO: 9.71 K/UL — SIGNIFICANT CHANGE UP (ref 4.8–10.8)

## 2025-01-01 PROCEDURE — 99233 SBSQ HOSP IP/OBS HIGH 50: CPT

## 2025-01-01 PROCEDURE — 99232 SBSQ HOSP IP/OBS MODERATE 35: CPT

## 2025-01-01 PROCEDURE — 93010 ELECTROCARDIOGRAM REPORT: CPT

## 2025-01-01 PROCEDURE — 93970 EXTREMITY STUDY: CPT | Mod: 26

## 2025-01-01 PROCEDURE — 71045 X-RAY EXAM CHEST 1 VIEW: CPT | Mod: 26

## 2025-01-01 PROCEDURE — 99222 1ST HOSP IP/OBS MODERATE 55: CPT

## 2025-01-01 PROCEDURE — 93010 ELECTROCARDIOGRAM REPORT: CPT | Mod: 77

## 2025-01-01 PROCEDURE — 76770 US EXAM ABDO BACK WALL COMP: CPT | Mod: 26

## 2025-01-01 PROCEDURE — 99239 HOSP IP/OBS DSCHRG MGMT >30: CPT

## 2025-01-01 PROCEDURE — 99223 1ST HOSP IP/OBS HIGH 75: CPT

## 2025-01-01 RX ORDER — LOPERAMIDE HCL 1 MG/5 ML
15 LIQUID (ML) ORAL
Refills: 0 | DISCHARGE

## 2025-01-01 RX ORDER — POTASSIUM CHLORIDE 600 MG/1
20 TABLET, FILM COATED, EXTENDED RELEASE ORAL DAILY
Refills: 0 | Status: DISCONTINUED | OUTPATIENT
Start: 2025-01-01 | End: 2025-01-01

## 2025-01-01 RX ORDER — INSULIN GLARGINE-YFGN 100 [IU]/ML
8 INJECTION, SOLUTION SUBCUTANEOUS AT BEDTIME
Refills: 0 | Status: DISCONTINUED | OUTPATIENT
Start: 2025-01-01 | End: 2025-01-01

## 2025-01-01 RX ORDER — POTASSIUM CHLORIDE 600 MG/1
20 TABLET, FILM COATED, EXTENDED RELEASE ORAL
Refills: 0 | Status: COMPLETED | OUTPATIENT
Start: 2025-01-01 | End: 2025-01-01

## 2025-01-01 RX ORDER — INSULIN GLARGINE-YFGN 100 [IU]/ML
16 INJECTION, SOLUTION SUBCUTANEOUS AT BEDTIME
Refills: 0 | Status: DISCONTINUED | OUTPATIENT
Start: 2025-01-01 | End: 2025-01-01

## 2025-01-01 RX ORDER — ACETAMINOPHEN 80 MG/.8ML
650 SOLUTION/ DROPS ORAL ONCE
Refills: 0 | Status: COMPLETED | OUTPATIENT
Start: 2025-01-01 | End: 2025-01-01

## 2025-01-01 RX ORDER — INSULIN HUMAN 100 [IU]/ML
10 INJECTION, SOLUTION SUBCUTANEOUS ONCE
Refills: 0 | Status: COMPLETED | OUTPATIENT
Start: 2025-01-01 | End: 2025-01-01

## 2025-01-01 RX ORDER — LABETALOL HCL 300 MG/1
300 TABLET, FILM COATED ORAL THREE TIMES A DAY
Refills: 0 | Status: DISCONTINUED | OUTPATIENT
Start: 2025-01-01 | End: 2025-01-01

## 2025-01-01 RX ORDER — INSULIN LISPRO 100/ML
VIAL (ML) SUBCUTANEOUS
Refills: 0 | Status: DISCONTINUED | OUTPATIENT
Start: 2025-01-01 | End: 2025-01-01

## 2025-01-01 RX ORDER — MORPHINE SULFATE 15 MG
2 TABLET, EXTENDED RELEASE ORAL
Refills: 0 | Status: DISCONTINUED | OUTPATIENT
Start: 2025-01-01 | End: 2025-01-01

## 2025-01-01 RX ORDER — DEXTROSE MONOHYDRATE 25 G/50ML
50 INJECTION, SOLUTION INTRAVENOUS ONCE
Refills: 0 | Status: COMPLETED | OUTPATIENT
Start: 2025-01-01 | End: 2025-01-01

## 2025-01-01 RX ORDER — INSULIN LISPRO 100/ML
4 VIAL (ML) SUBCUTANEOUS
Refills: 0 | Status: DISCONTINUED | OUTPATIENT
Start: 2025-01-01 | End: 2025-01-01

## 2025-01-01 RX ORDER — INSULIN LISPRO 100/ML
3 VIAL (ML) SUBCUTANEOUS
Qty: 0 | Refills: 0 | DISCHARGE
Start: 2025-01-01

## 2025-01-01 RX ORDER — FUROSEMIDE 20 MG
40 TABLET ORAL DAILY
Refills: 0 | Status: DISCONTINUED | OUTPATIENT
Start: 2025-01-01 | End: 2025-01-01

## 2025-01-01 RX ORDER — INSULIN LISPRO 100/ML
3 VIAL (ML) SUBCUTANEOUS EVERY 6 HOURS
Refills: 0 | Status: DISCONTINUED | OUTPATIENT
Start: 2025-01-01 | End: 2025-01-01

## 2025-01-01 RX ORDER — LOPERAMIDE HCL 1 MG/5 ML
2 LIQUID (ML) ORAL DAILY
Refills: 0 | Status: DISCONTINUED | OUTPATIENT
Start: 2025-01-01 | End: 2025-01-01

## 2025-01-01 RX ORDER — FUROSEMIDE 20 MG
40 TABLET ORAL
Refills: 0 | Status: DISCONTINUED | OUTPATIENT
Start: 2025-01-01 | End: 2025-01-01

## 2025-01-01 RX ORDER — INSULIN GLARGINE-YFGN 100 [IU]/ML
12 INJECTION, SOLUTION SUBCUTANEOUS AT BEDTIME
Refills: 0 | Status: DISCONTINUED | OUTPATIENT
Start: 2025-01-01 | End: 2025-01-01

## 2025-01-01 RX ORDER — POTASSIUM CHLORIDE 600 MG/1
15 TABLET, FILM COATED, EXTENDED RELEASE ORAL
Qty: 0 | Refills: 0 | DISCHARGE
Start: 2025-01-01

## 2025-01-01 RX ORDER — LABETALOL HCL 300 MG/1
100 TABLET, FILM COATED ORAL EVERY 8 HOURS
Refills: 0 | Status: DISCONTINUED | OUTPATIENT
Start: 2025-01-01 | End: 2025-01-01

## 2025-01-01 RX ORDER — CALCIUM GLUCONATE 94 MG/ML
2 INJECTION, SOLUTION INTRAVENOUS ONCE
Refills: 0 | Status: COMPLETED | OUTPATIENT
Start: 2025-01-01 | End: 2025-01-01

## 2025-01-01 RX ORDER — SODIUM CHLORIDE 9 MG/ML
1000 INJECTION, SOLUTION INTRAVENOUS
Refills: 0 | Status: DISCONTINUED | OUTPATIENT
Start: 2025-01-01 | End: 2025-01-01

## 2025-01-01 RX ORDER — INSULIN LISPRO 100/ML
VIAL (ML) SUBCUTANEOUS EVERY 6 HOURS
Refills: 0 | Status: DISCONTINUED | OUTPATIENT
Start: 2025-01-01 | End: 2025-01-01

## 2025-01-01 RX ORDER — FUROSEMIDE 20 MG
20 TABLET ORAL DAILY
Refills: 0 | Status: DISCONTINUED | OUTPATIENT
Start: 2025-01-01 | End: 2025-01-01

## 2025-01-01 RX ORDER — ACETAMINOPHEN 80 MG/.8ML
1000 SOLUTION/ DROPS ORAL ONCE
Refills: 0 | Status: DISCONTINUED | OUTPATIENT
Start: 2025-01-01 | End: 2025-01-01

## 2025-01-01 RX ORDER — FUROSEMIDE 20 MG
40 TABLET ORAL
Refills: 0 | Status: COMPLETED | OUTPATIENT
Start: 2025-01-01 | End: 2025-01-01

## 2025-01-01 RX ORDER — FUROSEMIDE 20 MG
40 TABLET ORAL ONCE
Refills: 0 | Status: COMPLETED | OUTPATIENT
Start: 2025-01-01 | End: 2025-01-01

## 2025-01-01 RX ORDER — CHLORHEXIDINE GLUCONATE 1.2 MG/ML
1 RINSE ORAL DAILY
Refills: 0 | Status: DISCONTINUED | OUTPATIENT
Start: 2025-01-01 | End: 2025-01-01

## 2025-01-01 RX ORDER — SODIUM ZIRCONIUM CYCLOSILICATE 10 G/10G
10 POWDER, FOR SUSPENSION ORAL ONCE
Refills: 0 | Status: COMPLETED | OUTPATIENT
Start: 2025-01-01 | End: 2025-01-01

## 2025-01-01 RX ORDER — FUROSEMIDE 20 MG
1 TABLET ORAL
Qty: 0 | Refills: 0 | DISCHARGE

## 2025-01-01 RX ORDER — DEXTROSE MONOHYDRATE 25 G/50ML
25 INJECTION, SOLUTION INTRAVENOUS ONCE
Refills: 0 | Status: COMPLETED | OUTPATIENT
Start: 2025-01-01 | End: 2025-01-01

## 2025-01-01 RX ORDER — INSULIN GLARGINE-YFGN 100 [IU]/ML
12 INJECTION, SOLUTION SUBCUTANEOUS
Qty: 5 | Refills: 0
Start: 2025-01-01

## 2025-01-01 RX ORDER — INSULIN HUMAN 100 [IU]/ML
5 INJECTION, SOLUTION SUBCUTANEOUS ONCE
Refills: 0 | Status: DISCONTINUED | OUTPATIENT
Start: 2025-01-01 | End: 2025-01-01

## 2025-01-01 RX ORDER — MAGNESIUM SULFATE 500 MG/ML
2 INJECTION, SOLUTION INTRAMUSCULAR; INTRAVENOUS ONCE
Refills: 0 | Status: COMPLETED | OUTPATIENT
Start: 2025-01-01 | End: 2025-01-01

## 2025-01-01 RX ORDER — POTASSIUM CHLORIDE 600 MG/1
40 TABLET, FILM COATED, EXTENDED RELEASE ORAL ONCE
Refills: 0 | Status: DISCONTINUED | OUTPATIENT
Start: 2025-01-01 | End: 2025-01-01

## 2025-01-01 RX ORDER — LABETALOL HCL 300 MG/1
200 TABLET, FILM COATED ORAL EVERY 12 HOURS
Refills: 0 | Status: DISCONTINUED | OUTPATIENT
Start: 2025-01-01 | End: 2025-01-01

## 2025-01-01 RX ORDER — DEXTROSE MONOHYDRATE 25 G/50ML
25 INJECTION, SOLUTION INTRAVENOUS ONCE
Refills: 0 | Status: DISCONTINUED | OUTPATIENT
Start: 2025-01-01 | End: 2025-01-01

## 2025-01-01 RX ORDER — LORAZEPAM 1 MG/1
0.5 TABLET ORAL EVERY 4 HOURS
Refills: 0 | Status: DISCONTINUED | OUTPATIENT
Start: 2025-01-01 | End: 2025-01-01

## 2025-01-01 RX ORDER — AMOXICILLIN/POTASSIUM CLAV 875-125 MG
TABLET ORAL
Refills: 0 | Status: DISCONTINUED | OUTPATIENT
Start: 2025-01-01 | End: 2025-01-01

## 2025-01-01 RX ORDER — ACETAMINOPHEN 80 MG/.8ML
650 SOLUTION/ DROPS ORAL EVERY 6 HOURS
Refills: 0 | Status: DISCONTINUED | OUTPATIENT
Start: 2025-01-01 | End: 2025-01-01

## 2025-01-01 RX ORDER — INSULIN LISPRO 100/ML
3 VIAL (ML) SUBCUTANEOUS
Refills: 0 | Status: DISCONTINUED | OUTPATIENT
Start: 2025-01-01 | End: 2025-01-01

## 2025-01-01 RX ORDER — POLYSORBATE 80 100 MG/10ML
1 SOLUTION/ DROPS OPHTHALMIC EVERY 8 HOURS
Refills: 0 | Status: DISCONTINUED | OUTPATIENT
Start: 2025-01-01 | End: 2025-01-01

## 2025-01-01 RX ORDER — INSULIN GLARGINE-YFGN 100 [IU]/ML
5 INJECTION, SOLUTION SUBCUTANEOUS AT BEDTIME
Refills: 0 | Status: DISCONTINUED | OUTPATIENT
Start: 2025-01-01 | End: 2025-01-01

## 2025-01-01 RX ORDER — INSULIN GLARGINE-YFGN 100 [IU]/ML
16 INJECTION, SOLUTION SUBCUTANEOUS
Qty: 5 | Refills: 0
Start: 2025-01-01

## 2025-01-01 RX ORDER — LACTOBACILLUS ACIDOPHILUS/PECT 75 MM-100
1 CAPSULE ORAL
Refills: 0 | DISCHARGE

## 2025-01-01 RX ORDER — INSULIN LISPRO 100/ML
0 VIAL (ML) SUBCUTANEOUS
Qty: 0 | Refills: 0 | DISCHARGE
Start: 2025-01-01

## 2025-01-01 RX ORDER — AMOXICILLIN/POTASSIUM CLAV 875-125 MG
800 TABLET ORAL ONCE
Refills: 0 | Status: COMPLETED | OUTPATIENT
Start: 2025-01-01 | End: 2025-01-01

## 2025-01-01 RX ORDER — PIPERACILLIN AND TAZOBACTAM 3; .375 G/15ML; G/15ML
3.38 INJECTION, POWDER, LYOPHILIZED, FOR SOLUTION INTRAVENOUS EVERY 8 HOURS
Refills: 0 | Status: COMPLETED | OUTPATIENT
Start: 2025-01-01 | End: 2025-01-01

## 2025-01-01 RX ORDER — INSULIN LISPRO 100/ML
4 VIAL (ML) SUBCUTANEOUS EVERY 6 HOURS
Refills: 0 | Status: DISCONTINUED | OUTPATIENT
Start: 2025-01-01 | End: 2025-01-01

## 2025-01-01 RX ORDER — POLYSORBATE 80 100 MG/10ML
1 SOLUTION/ DROPS OPHTHALMIC
Qty: 0 | Refills: 0 | DISCHARGE
Start: 2025-01-01

## 2025-01-01 RX ORDER — POTASSIUM CHLORIDE 600 MG/1
20 TABLET, FILM COATED, EXTENDED RELEASE ORAL ONCE
Refills: 0 | Status: COMPLETED | OUTPATIENT
Start: 2025-01-01 | End: 2025-01-01

## 2025-01-01 RX ORDER — LABETALOL HCL 300 MG/1
1 TABLET, FILM COATED ORAL
Qty: 90 | Refills: 0
Start: 2025-01-01 | End: 2025-02-09

## 2025-01-01 RX ADMIN — CHLORHEXIDINE GLUCONATE 1 APPLICATION(S): 1.2 RINSE ORAL at 13:30

## 2025-01-01 RX ADMIN — HEPARIN SODIUM 5000 UNIT(S): 1000 INJECTION, SOLUTION INTRAVENOUS; SUBCUTANEOUS at 17:40

## 2025-01-01 RX ADMIN — PIPERACILLIN AND TAZOBACTAM 25 GRAM(S): 3; .375 INJECTION, POWDER, LYOPHILIZED, FOR SOLUTION INTRAVENOUS at 13:36

## 2025-01-01 RX ADMIN — AMANTADINE HYDROCHLORIDE 50 MILLIGRAM(S): 100 CAPSULE ORAL at 17:02

## 2025-01-01 RX ADMIN — LACOSAMIDE 200 MILLIGRAM(S): 100 TABLET, FILM COATED ORAL at 05:16

## 2025-01-01 RX ADMIN — CHLORHEXIDINE GLUCONATE 1 APPLICATION(S): 1.2 RINSE ORAL at 11:50

## 2025-01-01 RX ADMIN — CHLORHEXIDINE GLUCONATE 1 APPLICATION(S): 1.2 RINSE ORAL at 11:29

## 2025-01-01 RX ADMIN — Medication 40 MILLIGRAM(S): at 10:50

## 2025-01-01 RX ADMIN — Medication 40 MILLIGRAM(S): at 06:15

## 2025-01-01 RX ADMIN — LABETALOL HCL 100 MILLIGRAM(S): 300 TABLET, FILM COATED ORAL at 18:16

## 2025-01-01 RX ADMIN — PIPERACILLIN AND TAZOBACTAM 25 GRAM(S): 3; .375 INJECTION, POWDER, LYOPHILIZED, FOR SOLUTION INTRAVENOUS at 06:01

## 2025-01-01 RX ADMIN — HEPARIN SODIUM 5000 UNIT(S): 1000 INJECTION, SOLUTION INTRAVENOUS; SUBCUTANEOUS at 05:56

## 2025-01-01 RX ADMIN — PANTOPRAZOLE 40 MILLIGRAM(S): 40 TABLET, DELAYED RELEASE ORAL at 12:25

## 2025-01-01 RX ADMIN — HEPARIN SODIUM 5000 UNIT(S): 1000 INJECTION, SOLUTION INTRAVENOUS; SUBCUTANEOUS at 05:12

## 2025-01-01 RX ADMIN — Medication 3 UNIT(S): at 08:03

## 2025-01-01 RX ADMIN — Medication 3 UNIT(S): at 18:07

## 2025-01-01 RX ADMIN — LEVETIRACETAM 1500 MILLIGRAM(S): 100 SOLUTION ORAL at 23:56

## 2025-01-01 RX ADMIN — ACETAMINOPHEN 650 MILLIGRAM(S): 80 SOLUTION/ DROPS ORAL at 16:26

## 2025-01-01 RX ADMIN — LEVETIRACETAM 1500 MILLIGRAM(S): 100 SOLUTION ORAL at 05:06

## 2025-01-01 RX ADMIN — LABETALOL HCL 300 MILLIGRAM(S): 300 TABLET, FILM COATED ORAL at 13:56

## 2025-01-01 RX ADMIN — PIPERACILLIN AND TAZOBACTAM 25 GRAM(S): 3; .375 INJECTION, POWDER, LYOPHILIZED, FOR SOLUTION INTRAVENOUS at 16:18

## 2025-01-01 RX ADMIN — SODIUM CHLORIDE 75 MILLILITER(S): 9 INJECTION, SOLUTION INTRAVENOUS at 11:55

## 2025-01-01 RX ADMIN — LEVETIRACETAM 1500 MILLIGRAM(S): 100 SOLUTION ORAL at 17:22

## 2025-01-01 RX ADMIN — CHLORHEXIDINE GLUCONATE 1 APPLICATION(S): 1.2 RINSE ORAL at 12:25

## 2025-01-01 RX ADMIN — LACOSAMIDE 200 MILLIGRAM(S): 100 TABLET, FILM COATED ORAL at 05:03

## 2025-01-01 RX ADMIN — AMANTADINE HYDROCHLORIDE 50 MILLIGRAM(S): 100 CAPSULE ORAL at 05:06

## 2025-01-01 RX ADMIN — HEPARIN SODIUM 5000 UNIT(S): 1000 INJECTION, SOLUTION INTRAVENOUS; SUBCUTANEOUS at 05:05

## 2025-01-01 RX ADMIN — CHLORHEXIDINE GLUCONATE 1 APPLICATION(S): 1.2 RINSE ORAL at 11:21

## 2025-01-01 RX ADMIN — Medication 5 MILLIGRAM(S): at 06:31

## 2025-01-01 RX ADMIN — AMANTADINE HYDROCHLORIDE 50 MILLIGRAM(S): 100 CAPSULE ORAL at 18:18

## 2025-01-01 RX ADMIN — PANTOPRAZOLE 40 MILLIGRAM(S): 40 TABLET, DELAYED RELEASE ORAL at 11:30

## 2025-01-01 RX ADMIN — LABETALOL HCL 100 MILLIGRAM(S): 300 TABLET, FILM COATED ORAL at 22:43

## 2025-01-01 RX ADMIN — Medication 1: at 11:37

## 2025-01-01 RX ADMIN — Medication 3 UNIT(S): at 17:00

## 2025-01-01 RX ADMIN — INSULIN GLARGINE-YFGN 12 UNIT(S): 100 INJECTION, SOLUTION SUBCUTANEOUS at 21:16

## 2025-01-01 RX ADMIN — AMANTADINE HYDROCHLORIDE 50 MILLIGRAM(S): 100 CAPSULE ORAL at 05:02

## 2025-01-01 RX ADMIN — PANTOPRAZOLE 40 MILLIGRAM(S): 40 TABLET, DELAYED RELEASE ORAL at 12:07

## 2025-01-01 RX ADMIN — HEPARIN SODIUM 5000 UNIT(S): 1000 INJECTION, SOLUTION INTRAVENOUS; SUBCUTANEOUS at 06:26

## 2025-01-01 RX ADMIN — AMANTADINE HYDROCHLORIDE 50 MILLIGRAM(S): 100 CAPSULE ORAL at 06:19

## 2025-01-01 RX ADMIN — PIPERACILLIN AND TAZOBACTAM 25 GRAM(S): 3; .375 INJECTION, POWDER, LYOPHILIZED, FOR SOLUTION INTRAVENOUS at 13:55

## 2025-01-01 RX ADMIN — LACOSAMIDE 200 MILLIGRAM(S): 100 TABLET, FILM COATED ORAL at 17:35

## 2025-01-01 RX ADMIN — SODIUM ZIRCONIUM CYCLOSILICATE 10 GRAM(S): 10 POWDER, FOR SUSPENSION ORAL at 02:05

## 2025-01-01 RX ADMIN — Medication 10 MILLIGRAM(S): at 05:12

## 2025-01-01 RX ADMIN — Medication 3 UNIT(S): at 12:06

## 2025-01-01 RX ADMIN — LACOSAMIDE 200 MILLIGRAM(S): 100 TABLET, FILM COATED ORAL at 05:58

## 2025-01-01 RX ADMIN — CHLORHEXIDINE GLUCONATE 1 APPLICATION(S): 1.2 RINSE ORAL at 11:09

## 2025-01-01 RX ADMIN — POTASSIUM CHLORIDE 20 MILLIEQUIVALENT(S): 600 TABLET, FILM COATED, EXTENDED RELEASE ORAL at 11:56

## 2025-01-01 RX ADMIN — MAGNESIUM SULFATE 25 GRAM(S): 500 INJECTION, SOLUTION INTRAMUSCULAR; INTRAVENOUS at 17:27

## 2025-01-01 RX ADMIN — INSULIN GLARGINE-YFGN 5 UNIT(S): 100 INJECTION, SOLUTION SUBCUTANEOUS at 23:12

## 2025-01-01 RX ADMIN — Medication 1: at 11:21

## 2025-01-01 RX ADMIN — LACOSAMIDE 200 MILLIGRAM(S): 100 TABLET, FILM COATED ORAL at 18:25

## 2025-01-01 RX ADMIN — Medication 3: at 07:41

## 2025-01-01 RX ADMIN — ACETAMINOPHEN 650 MILLIGRAM(S): 80 SOLUTION/ DROPS ORAL at 19:56

## 2025-01-01 RX ADMIN — Medication 20 MILLIGRAM(S): at 06:01

## 2025-01-01 RX ADMIN — PIPERACILLIN AND TAZOBACTAM 25 GRAM(S): 3; .375 INJECTION, POWDER, LYOPHILIZED, FOR SOLUTION INTRAVENOUS at 21:29

## 2025-01-01 RX ADMIN — Medication 1: at 17:30

## 2025-01-01 RX ADMIN — LACOSAMIDE 200 MILLIGRAM(S): 100 TABLET, FILM COATED ORAL at 17:44

## 2025-01-01 RX ADMIN — INSULIN GLARGINE-YFGN 5 UNIT(S): 100 INJECTION, SOLUTION SUBCUTANEOUS at 22:42

## 2025-01-01 RX ADMIN — MAGNESIUM SULFATE 25 GRAM(S): 500 INJECTION, SOLUTION INTRAMUSCULAR; INTRAVENOUS at 16:56

## 2025-01-01 RX ADMIN — Medication 1: at 11:56

## 2025-01-01 RX ADMIN — Medication 2: at 22:46

## 2025-01-01 RX ADMIN — AMANTADINE HYDROCHLORIDE 50 MILLIGRAM(S): 100 CAPSULE ORAL at 05:56

## 2025-01-01 RX ADMIN — POTASSIUM CHLORIDE 50 MILLIEQUIVALENT(S): 600 TABLET, FILM COATED, EXTENDED RELEASE ORAL at 13:39

## 2025-01-01 RX ADMIN — Medication 1: at 17:24

## 2025-01-01 RX ADMIN — PIPERACILLIN AND TAZOBACTAM 25 GRAM(S): 3; .375 INJECTION, POWDER, LYOPHILIZED, FOR SOLUTION INTRAVENOUS at 06:37

## 2025-01-01 RX ADMIN — POLYSORBATE 80 1 DROP(S): 100 SOLUTION/ DROPS OPHTHALMIC at 21:39

## 2025-01-01 RX ADMIN — Medication 4 UNIT(S): at 11:41

## 2025-01-01 RX ADMIN — Medication 4 UNIT(S): at 23:06

## 2025-01-01 RX ADMIN — DEXTROSE MONOHYDRATE 50 MILLILITER(S): 25 INJECTION, SOLUTION INTRAVENOUS at 11:27

## 2025-01-01 RX ADMIN — Medication 20 MILLIGRAM(S): at 06:44

## 2025-01-01 RX ADMIN — AMANTADINE HYDROCHLORIDE 50 MILLIGRAM(S): 100 CAPSULE ORAL at 17:21

## 2025-01-01 RX ADMIN — Medication 2 UNIT(S): at 11:29

## 2025-01-01 RX ADMIN — LEVETIRACETAM 1500 MILLIGRAM(S): 100 SOLUTION ORAL at 18:07

## 2025-01-01 RX ADMIN — PIPERACILLIN AND TAZOBACTAM 25 GRAM(S): 3; .375 INJECTION, POWDER, LYOPHILIZED, FOR SOLUTION INTRAVENOUS at 21:34

## 2025-01-01 RX ADMIN — SODIUM CHLORIDE 125 MILLILITER(S): 9 INJECTION, SOLUTION INTRAVENOUS at 12:01

## 2025-01-01 RX ADMIN — Medication 2: at 09:35

## 2025-01-01 RX ADMIN — PIPERACILLIN AND TAZOBACTAM 25 GRAM(S): 3; .375 INJECTION, POWDER, LYOPHILIZED, FOR SOLUTION INTRAVENOUS at 05:57

## 2025-01-01 RX ADMIN — PANTOPRAZOLE 40 MILLIGRAM(S): 40 TABLET, DELAYED RELEASE ORAL at 11:22

## 2025-01-01 RX ADMIN — Medication 800 MILLIGRAM(S): at 16:26

## 2025-01-01 RX ADMIN — AMANTADINE HYDROCHLORIDE 50 MILLIGRAM(S): 100 CAPSULE ORAL at 06:38

## 2025-01-01 RX ADMIN — DEXTROSE MONOHYDRATE 50 MILLILITER(S): 25 INJECTION, SOLUTION INTRAVENOUS at 07:38

## 2025-01-01 RX ADMIN — PIPERACILLIN AND TAZOBACTAM 25 GRAM(S): 3; .375 INJECTION, POWDER, LYOPHILIZED, FOR SOLUTION INTRAVENOUS at 05:00

## 2025-01-01 RX ADMIN — POLYSORBATE 80 1 DROP(S): 100 SOLUTION/ DROPS OPHTHALMIC at 16:19

## 2025-01-01 RX ADMIN — Medication 40 MILLIGRAM(S): at 06:33

## 2025-01-01 RX ADMIN — Medication 2 UNIT(S): at 07:52

## 2025-01-01 RX ADMIN — Medication 4 UNIT(S): at 18:17

## 2025-01-01 RX ADMIN — Medication 1: at 08:09

## 2025-01-01 RX ADMIN — LEVETIRACETAM 1500 MILLIGRAM(S): 100 SOLUTION ORAL at 05:12

## 2025-01-01 RX ADMIN — Medication 1: at 18:00

## 2025-01-01 RX ADMIN — Medication 3: at 07:48

## 2025-01-01 RX ADMIN — CHLORHEXIDINE GLUCONATE 1 APPLICATION(S): 1.2 RINSE ORAL at 11:44

## 2025-01-01 RX ADMIN — CALCIUM GLUCONATE 200 GRAM(S): 94 INJECTION, SOLUTION INTRAVENOUS at 02:04

## 2025-01-01 RX ADMIN — Medication 40 MILLIGRAM(S): at 13:34

## 2025-01-01 RX ADMIN — CHLORHEXIDINE GLUCONATE 1 APPLICATION(S): 1.2 RINSE ORAL at 14:15

## 2025-01-01 RX ADMIN — Medication 4 UNIT(S): at 08:02

## 2025-01-01 RX ADMIN — INSULIN GLARGINE-YFGN 16 UNIT(S): 100 INJECTION, SOLUTION SUBCUTANEOUS at 22:43

## 2025-01-01 RX ADMIN — INSULIN GLARGINE-YFGN 8 UNIT(S): 100 INJECTION, SOLUTION SUBCUTANEOUS at 22:28

## 2025-01-01 RX ADMIN — POTASSIUM CHLORIDE 50 MILLIEQUIVALENT(S): 600 TABLET, FILM COATED, EXTENDED RELEASE ORAL at 18:44

## 2025-01-01 RX ADMIN — Medication 2 MILLIGRAM(S): at 13:31

## 2025-01-01 RX ADMIN — HEPARIN SODIUM 5000 UNIT(S): 1000 INJECTION, SOLUTION INTRAVENOUS; SUBCUTANEOUS at 18:13

## 2025-01-01 RX ADMIN — Medication 1: at 23:06

## 2025-01-01 RX ADMIN — POTASSIUM CHLORIDE 20 MILLIEQUIVALENT(S): 600 TABLET, FILM COATED, EXTENDED RELEASE ORAL at 12:08

## 2025-01-01 RX ADMIN — Medication 1: at 11:43

## 2025-01-01 RX ADMIN — Medication 1: at 11:40

## 2025-01-01 RX ADMIN — LEVETIRACETAM 1500 MILLIGRAM(S): 100 SOLUTION ORAL at 17:33

## 2025-01-01 RX ADMIN — Medication 10 MILLIGRAM(S): at 21:39

## 2025-01-01 RX ADMIN — Medication 20 MILLIGRAM(S): at 06:32

## 2025-01-01 RX ADMIN — LACOSAMIDE 200 MILLIGRAM(S): 100 TABLET, FILM COATED ORAL at 18:01

## 2025-01-01 RX ADMIN — HEPARIN SODIUM 5000 UNIT(S): 1000 INJECTION, SOLUTION INTRAVENOUS; SUBCUTANEOUS at 06:43

## 2025-01-01 RX ADMIN — POTASSIUM CHLORIDE 20 MILLIEQUIVALENT(S): 600 TABLET, FILM COATED, EXTENDED RELEASE ORAL at 11:32

## 2025-01-01 RX ADMIN — DEXTROSE MONOHYDRATE 25 GRAM(S): 25 INJECTION, SOLUTION INTRAVENOUS at 13:55

## 2025-01-01 RX ADMIN — LACOSAMIDE 200 MILLIGRAM(S): 100 TABLET, FILM COATED ORAL at 17:01

## 2025-01-01 RX ADMIN — Medication 2: at 17:47

## 2025-01-01 RX ADMIN — Medication 2 MILLIGRAM(S): at 11:57

## 2025-01-01 RX ADMIN — Medication 10 MILLIGRAM(S): at 21:17

## 2025-01-01 RX ADMIN — LEVETIRACETAM 1500 MILLIGRAM(S): 100 SOLUTION ORAL at 06:26

## 2025-01-01 RX ADMIN — CHLORHEXIDINE GLUCONATE 1 APPLICATION(S): 1.2 RINSE ORAL at 11:57

## 2025-01-01 RX ADMIN — LACOSAMIDE 200 MILLIGRAM(S): 100 TABLET, FILM COATED ORAL at 17:00

## 2025-01-01 RX ADMIN — Medication 40 MILLIGRAM(S): at 08:25

## 2025-01-01 RX ADMIN — LABETALOL HCL 300 MILLIGRAM(S): 300 TABLET, FILM COATED ORAL at 11:23

## 2025-01-01 RX ADMIN — LEVETIRACETAM 1500 MILLIGRAM(S): 100 SOLUTION ORAL at 06:43

## 2025-01-01 RX ADMIN — LABETALOL HCL 300 MILLIGRAM(S): 300 TABLET, FILM COATED ORAL at 06:00

## 2025-01-01 RX ADMIN — LACOSAMIDE 200 MILLIGRAM(S): 100 TABLET, FILM COATED ORAL at 06:44

## 2025-01-01 RX ADMIN — PIPERACILLIN AND TAZOBACTAM 25 GRAM(S): 3; .375 INJECTION, POWDER, LYOPHILIZED, FOR SOLUTION INTRAVENOUS at 14:44

## 2025-01-01 RX ADMIN — AMANTADINE HYDROCHLORIDE 50 MILLIGRAM(S): 100 CAPSULE ORAL at 18:03

## 2025-01-01 RX ADMIN — LEVETIRACETAM 1500 MILLIGRAM(S): 100 SOLUTION ORAL at 17:48

## 2025-01-01 RX ADMIN — SODIUM ZIRCONIUM CYCLOSILICATE 10 GRAM(S): 10 POWDER, FOR SUSPENSION ORAL at 14:48

## 2025-01-01 RX ADMIN — Medication 3 UNIT(S): at 06:19

## 2025-01-01 RX ADMIN — Medication 200 GRAM(S): at 11:02

## 2025-01-01 RX ADMIN — HEPARIN SODIUM 5000 UNIT(S): 1000 INJECTION, SOLUTION INTRAVENOUS; SUBCUTANEOUS at 06:34

## 2025-01-01 RX ADMIN — Medication 2 UNIT(S): at 17:24

## 2025-01-01 RX ADMIN — POLYSORBATE 80 1 DROP(S): 100 SOLUTION/ DROPS OPHTHALMIC at 21:11

## 2025-01-01 RX ADMIN — INSULIN GLARGINE-YFGN 5 UNIT(S): 100 INJECTION, SOLUTION SUBCUTANEOUS at 21:56

## 2025-01-01 RX ADMIN — Medication 3: at 12:24

## 2025-01-01 RX ADMIN — Medication 3 UNIT(S): at 11:22

## 2025-01-01 RX ADMIN — PIPERACILLIN AND TAZOBACTAM 25 GRAM(S): 3; .375 INJECTION, POWDER, LYOPHILIZED, FOR SOLUTION INTRAVENOUS at 21:18

## 2025-01-01 RX ADMIN — Medication 5 MILLIGRAM(S): at 06:33

## 2025-01-01 RX ADMIN — LACOSAMIDE 200 MILLIGRAM(S): 100 TABLET, FILM COATED ORAL at 18:04

## 2025-01-01 RX ADMIN — PIPERACILLIN AND TAZOBACTAM 25 GRAM(S): 3; .375 INJECTION, POWDER, LYOPHILIZED, FOR SOLUTION INTRAVENOUS at 16:56

## 2025-01-01 RX ADMIN — Medication 3: at 08:12

## 2025-01-01 RX ADMIN — Medication 3 UNIT(S): at 07:41

## 2025-01-01 RX ADMIN — PIPERACILLIN AND TAZOBACTAM 25 GRAM(S): 3; .375 INJECTION, POWDER, LYOPHILIZED, FOR SOLUTION INTRAVENOUS at 13:29

## 2025-01-01 RX ADMIN — PANTOPRAZOLE 40 MILLIGRAM(S): 40 TABLET, DELAYED RELEASE ORAL at 11:57

## 2025-01-01 RX ADMIN — LEVETIRACETAM 1500 MILLIGRAM(S): 100 SOLUTION ORAL at 17:06

## 2025-01-01 RX ADMIN — Medication 200 GRAM(S): at 14:47

## 2025-01-01 RX ADMIN — LEVETIRACETAM 1500 MILLIGRAM(S): 100 SOLUTION ORAL at 06:01

## 2025-01-01 RX ADMIN — HEPARIN SODIUM 5000 UNIT(S): 1000 INJECTION, SOLUTION INTRAVENOUS; SUBCUTANEOUS at 18:14

## 2025-01-01 RX ADMIN — Medication 40 MILLIGRAM(S): at 05:12

## 2025-01-01 RX ADMIN — Medication 2 UNIT(S): at 11:42

## 2025-01-01 RX ADMIN — LEVETIRACETAM 1500 MILLIGRAM(S): 100 SOLUTION ORAL at 06:31

## 2025-01-01 RX ADMIN — PANTOPRAZOLE 40 MILLIGRAM(S): 40 TABLET, DELAYED RELEASE ORAL at 13:31

## 2025-01-01 RX ADMIN — PIPERACILLIN AND TAZOBACTAM 25 GRAM(S): 3; .375 INJECTION, POWDER, LYOPHILIZED, FOR SOLUTION INTRAVENOUS at 06:27

## 2025-01-01 RX ADMIN — LABETALOL HCL 300 MILLIGRAM(S): 300 TABLET, FILM COATED ORAL at 06:18

## 2025-01-01 RX ADMIN — AMANTADINE HYDROCHLORIDE 50 MILLIGRAM(S): 100 CAPSULE ORAL at 05:16

## 2025-01-01 RX ADMIN — POTASSIUM CHLORIDE 50 MILLIEQUIVALENT(S): 600 TABLET, FILM COATED, EXTENDED RELEASE ORAL at 06:40

## 2025-01-01 RX ADMIN — Medication 10 MILLIGRAM(S): at 06:02

## 2025-01-01 RX ADMIN — Medication 5 MILLIGRAM(S): at 05:05

## 2025-01-01 RX ADMIN — HEPARIN SODIUM 5000 UNIT(S): 1000 INJECTION, SOLUTION INTRAVENOUS; SUBCUTANEOUS at 06:00

## 2025-01-01 RX ADMIN — POLYSORBATE 80 1 DROP(S): 100 SOLUTION/ DROPS OPHTHALMIC at 22:55

## 2025-01-01 RX ADMIN — PIPERACILLIN AND TAZOBACTAM 25 GRAM(S): 3; .375 INJECTION, POWDER, LYOPHILIZED, FOR SOLUTION INTRAVENOUS at 22:28

## 2025-01-01 RX ADMIN — PANTOPRAZOLE 40 MILLIGRAM(S): 40 TABLET, DELAYED RELEASE ORAL at 11:48

## 2025-01-01 RX ADMIN — Medication 200 GRAM(S): at 02:05

## 2025-01-01 RX ADMIN — LACOSAMIDE 200 MILLIGRAM(S): 100 TABLET, FILM COATED ORAL at 06:36

## 2025-01-01 RX ADMIN — Medication 3 UNIT(S): at 16:59

## 2025-01-01 RX ADMIN — HEPARIN SODIUM 5000 UNIT(S): 1000 INJECTION, SOLUTION INTRAVENOUS; SUBCUTANEOUS at 05:07

## 2025-01-01 RX ADMIN — Medication 2 UNIT(S): at 08:12

## 2025-01-01 RX ADMIN — PIPERACILLIN AND TAZOBACTAM 25 GRAM(S): 3; .375 INJECTION, POWDER, LYOPHILIZED, FOR SOLUTION INTRAVENOUS at 23:01

## 2025-01-01 RX ADMIN — LABETALOL HCL 100 MILLIGRAM(S): 300 TABLET, FILM COATED ORAL at 23:06

## 2025-01-01 RX ADMIN — PANTOPRAZOLE 40 MILLIGRAM(S): 40 TABLET, DELAYED RELEASE ORAL at 11:37

## 2025-01-01 RX ADMIN — PIPERACILLIN AND TAZOBACTAM 25 GRAM(S): 3; .375 INJECTION, POWDER, LYOPHILIZED, FOR SOLUTION INTRAVENOUS at 06:45

## 2025-01-01 RX ADMIN — PANTOPRAZOLE 40 MILLIGRAM(S): 40 TABLET, DELAYED RELEASE ORAL at 11:18

## 2025-01-01 RX ADMIN — Medication 2 UNIT(S): at 11:47

## 2025-01-01 RX ADMIN — Medication 2: at 11:30

## 2025-01-01 RX ADMIN — POTASSIUM CHLORIDE 20 MILLIEQUIVALENT(S): 600 TABLET, FILM COATED, EXTENDED RELEASE ORAL at 13:32

## 2025-01-01 RX ADMIN — HEPARIN SODIUM 5000 UNIT(S): 1000 INJECTION, SOLUTION INTRAVENOUS; SUBCUTANEOUS at 17:50

## 2025-01-01 RX ADMIN — Medication 4 UNIT(S): at 00:14

## 2025-01-01 RX ADMIN — Medication 2 MILLIGRAM(S): at 11:18

## 2025-01-01 RX ADMIN — POLYSORBATE 80 1 DROP(S): 100 SOLUTION/ DROPS OPHTHALMIC at 05:59

## 2025-01-01 RX ADMIN — LABETALOL HCL 100 MILLIGRAM(S): 300 TABLET, FILM COATED ORAL at 13:31

## 2025-01-01 RX ADMIN — LABETALOL HCL 100 MILLIGRAM(S): 300 TABLET, FILM COATED ORAL at 09:28

## 2025-01-01 RX ADMIN — Medication 3: at 16:58

## 2025-01-01 RX ADMIN — LACOSAMIDE 200 MILLIGRAM(S): 100 TABLET, FILM COATED ORAL at 06:31

## 2025-01-01 RX ADMIN — Medication 3: at 06:57

## 2025-01-01 RX ADMIN — Medication 3 UNIT(S): at 06:15

## 2025-01-01 RX ADMIN — PIPERACILLIN AND TAZOBACTAM 25 GRAM(S): 3; .375 INJECTION, POWDER, LYOPHILIZED, FOR SOLUTION INTRAVENOUS at 21:39

## 2025-01-01 RX ADMIN — INSULIN GLARGINE-YFGN 16 UNIT(S): 100 INJECTION, SOLUTION SUBCUTANEOUS at 21:49

## 2025-01-01 RX ADMIN — Medication 3 UNIT(S): at 00:35

## 2025-01-01 RX ADMIN — LEVETIRACETAM 1500 MILLIGRAM(S): 100 SOLUTION ORAL at 06:49

## 2025-01-01 RX ADMIN — Medication 4 UNIT(S): at 17:24

## 2025-01-01 RX ADMIN — LACOSAMIDE 200 MILLIGRAM(S): 100 TABLET, FILM COATED ORAL at 17:40

## 2025-01-01 RX ADMIN — Medication 40 MILLIGRAM(S): at 05:01

## 2025-01-01 RX ADMIN — HEPARIN SODIUM 5000 UNIT(S): 1000 INJECTION, SOLUTION INTRAVENOUS; SUBCUTANEOUS at 06:15

## 2025-01-01 RX ADMIN — POLYSORBATE 80 1 DROP(S): 100 SOLUTION/ DROPS OPHTHALMIC at 06:44

## 2025-01-01 RX ADMIN — Medication 1: at 11:41

## 2025-01-01 RX ADMIN — Medication 5 MILLIGRAM(S): at 21:27

## 2025-01-01 RX ADMIN — LACOSAMIDE 200 MILLIGRAM(S): 100 TABLET, FILM COATED ORAL at 06:19

## 2025-01-01 RX ADMIN — AMANTADINE HYDROCHLORIDE 50 MILLIGRAM(S): 100 CAPSULE ORAL at 05:13

## 2025-01-01 RX ADMIN — SODIUM ZIRCONIUM CYCLOSILICATE 10 GRAM(S): 10 POWDER, FOR SUSPENSION ORAL at 13:36

## 2025-01-01 RX ADMIN — LABETALOL HCL 200 MILLIGRAM(S): 300 TABLET, FILM COATED ORAL at 06:43

## 2025-01-01 RX ADMIN — ACETAMINOPHEN 650 MILLIGRAM(S): 80 SOLUTION/ DROPS ORAL at 06:47

## 2025-01-01 RX ADMIN — Medication 2 UNIT(S): at 06:57

## 2025-01-01 RX ADMIN — DEXTROSE MONOHYDRATE 25 GRAM(S): 25 INJECTION, SOLUTION INTRAVENOUS at 07:02

## 2025-01-01 RX ADMIN — Medication 10 MILLIGRAM(S): at 05:59

## 2025-01-01 RX ADMIN — LACOSAMIDE 200 MILLIGRAM(S): 100 TABLET, FILM COATED ORAL at 06:15

## 2025-01-01 RX ADMIN — PIPERACILLIN AND TAZOBACTAM 25 GRAM(S): 3; .375 INJECTION, POWDER, LYOPHILIZED, FOR SOLUTION INTRAVENOUS at 22:42

## 2025-01-01 RX ADMIN — LEVETIRACETAM 1500 MILLIGRAM(S): 100 SOLUTION ORAL at 06:20

## 2025-01-01 RX ADMIN — Medication 2 UNIT(S): at 11:37

## 2025-01-01 RX ADMIN — CHLORHEXIDINE GLUCONATE 1 APPLICATION(S): 1.2 RINSE ORAL at 13:47

## 2025-01-01 RX ADMIN — AMANTADINE HYDROCHLORIDE 50 MILLIGRAM(S): 100 CAPSULE ORAL at 06:44

## 2025-01-01 RX ADMIN — Medication 2 UNIT(S): at 08:05

## 2025-01-01 RX ADMIN — LEVETIRACETAM 1500 MILLIGRAM(S): 100 SOLUTION ORAL at 05:16

## 2025-01-01 RX ADMIN — HEPARIN SODIUM 5000 UNIT(S): 1000 INJECTION, SOLUTION INTRAVENOUS; SUBCUTANEOUS at 06:31

## 2025-01-01 RX ADMIN — AMANTADINE HYDROCHLORIDE 50 MILLIGRAM(S): 100 CAPSULE ORAL at 06:33

## 2025-01-01 RX ADMIN — INSULIN GLARGINE-YFGN 16 UNIT(S): 100 INJECTION, SOLUTION SUBCUTANEOUS at 23:04

## 2025-01-01 RX ADMIN — AMANTADINE HYDROCHLORIDE 50 MILLIGRAM(S): 100 CAPSULE ORAL at 18:01

## 2025-01-01 RX ADMIN — CHLORHEXIDINE GLUCONATE 1 APPLICATION(S): 1.2 RINSE ORAL at 11:17

## 2025-01-01 RX ADMIN — LACOSAMIDE 200 MILLIGRAM(S): 100 TABLET, FILM COATED ORAL at 20:51

## 2025-01-01 RX ADMIN — INSULIN GLARGINE-YFGN 5 UNIT(S): 100 INJECTION, SOLUTION SUBCUTANEOUS at 21:07

## 2025-01-01 RX ADMIN — HEPARIN SODIUM 5000 UNIT(S): 1000 INJECTION, SOLUTION INTRAVENOUS; SUBCUTANEOUS at 18:07

## 2025-01-01 RX ADMIN — Medication 2 UNIT(S): at 18:00

## 2025-01-01 RX ADMIN — LACOSAMIDE 200 MILLIGRAM(S): 100 TABLET, FILM COATED ORAL at 06:18

## 2025-01-01 RX ADMIN — Medication 3 UNIT(S): at 12:24

## 2025-01-01 RX ADMIN — ACETAMINOPHEN 650 MILLIGRAM(S): 80 SOLUTION/ DROPS ORAL at 16:29

## 2025-01-01 RX ADMIN — AMANTADINE HYDROCHLORIDE 50 MILLIGRAM(S): 100 CAPSULE ORAL at 17:30

## 2025-01-01 RX ADMIN — PIPERACILLIN AND TAZOBACTAM 25 GRAM(S): 3; .375 INJECTION, POWDER, LYOPHILIZED, FOR SOLUTION INTRAVENOUS at 21:07

## 2025-01-01 RX ADMIN — Medication 1: at 08:05

## 2025-01-01 RX ADMIN — AMANTADINE HYDROCHLORIDE 50 MILLIGRAM(S): 100 CAPSULE ORAL at 06:01

## 2025-01-01 RX ADMIN — Medication 2 MILLIGRAM(S): at 13:40

## 2025-01-01 RX ADMIN — PIPERACILLIN AND TAZOBACTAM 25 GRAM(S): 3; .375 INJECTION, POWDER, LYOPHILIZED, FOR SOLUTION INTRAVENOUS at 13:25

## 2025-01-01 RX ADMIN — AMANTADINE HYDROCHLORIDE 50 MILLIGRAM(S): 100 CAPSULE ORAL at 17:41

## 2025-01-01 RX ADMIN — Medication 10 MILLIGRAM(S): at 05:07

## 2025-01-01 RX ADMIN — PIPERACILLIN AND TAZOBACTAM 25 GRAM(S): 3; .375 INJECTION, POWDER, LYOPHILIZED, FOR SOLUTION INTRAVENOUS at 05:03

## 2025-01-01 RX ADMIN — LEVETIRACETAM 1500 MILLIGRAM(S): 100 SOLUTION ORAL at 17:41

## 2025-01-01 RX ADMIN — LABETALOL HCL 200 MILLIGRAM(S): 300 TABLET, FILM COATED ORAL at 17:40

## 2025-01-01 RX ADMIN — LACOSAMIDE 200 MILLIGRAM(S): 100 TABLET, FILM COATED ORAL at 18:12

## 2025-01-01 RX ADMIN — Medication 2 UNIT(S): at 11:09

## 2025-01-01 RX ADMIN — Medication 2 MILLIGRAM(S): at 06:08

## 2025-01-01 RX ADMIN — Medication 2: at 07:53

## 2025-01-01 RX ADMIN — LEVETIRACETAM 1500 MILLIGRAM(S): 100 SOLUTION ORAL at 16:59

## 2025-01-01 RX ADMIN — PANTOPRAZOLE 40 MILLIGRAM(S): 40 TABLET, DELAYED RELEASE ORAL at 11:43

## 2025-01-01 RX ADMIN — LABETALOL HCL 300 MILLIGRAM(S): 300 TABLET, FILM COATED ORAL at 21:46

## 2025-01-01 RX ADMIN — PIPERACILLIN AND TAZOBACTAM 25 GRAM(S): 3; .375 INJECTION, POWDER, LYOPHILIZED, FOR SOLUTION INTRAVENOUS at 06:31

## 2025-01-01 RX ADMIN — Medication 40 MILLIGRAM(S): at 06:26

## 2025-01-01 RX ADMIN — CHLORHEXIDINE GLUCONATE 1 APPLICATION(S): 1.2 RINSE ORAL at 12:07

## 2025-01-01 RX ADMIN — LACOSAMIDE 200 MILLIGRAM(S): 100 TABLET, FILM COATED ORAL at 17:05

## 2025-01-01 RX ADMIN — LABETALOL HCL 100 MILLIGRAM(S): 300 TABLET, FILM COATED ORAL at 06:18

## 2025-01-01 RX ADMIN — POLYSORBATE 80 1 DROP(S): 100 SOLUTION/ DROPS OPHTHALMIC at 13:52

## 2025-01-01 RX ADMIN — POLYSORBATE 80 1 DROP(S): 100 SOLUTION/ DROPS OPHTHALMIC at 06:49

## 2025-01-01 RX ADMIN — Medication 2 UNIT(S): at 09:35

## 2025-01-01 RX ADMIN — Medication 40 MILLIGRAM(S): at 05:07

## 2025-01-01 RX ADMIN — PIPERACILLIN AND TAZOBACTAM 25 GRAM(S): 3; .375 INJECTION, POWDER, LYOPHILIZED, FOR SOLUTION INTRAVENOUS at 15:04

## 2025-01-01 RX ADMIN — HEPARIN SODIUM 5000 UNIT(S): 1000 INJECTION, SOLUTION INTRAVENOUS; SUBCUTANEOUS at 06:02

## 2025-01-01 RX ADMIN — INSULIN GLARGINE-YFGN 5 UNIT(S): 100 INJECTION, SOLUTION SUBCUTANEOUS at 21:03

## 2025-01-01 RX ADMIN — ACETAMINOPHEN 650 MILLIGRAM(S): 80 SOLUTION/ DROPS ORAL at 07:15

## 2025-01-01 RX ADMIN — INSULIN GLARGINE-YFGN 5 UNIT(S): 100 INJECTION, SOLUTION SUBCUTANEOUS at 22:45

## 2025-01-01 RX ADMIN — LEVETIRACETAM 1500 MILLIGRAM(S): 100 SOLUTION ORAL at 18:26

## 2025-01-01 RX ADMIN — LEVETIRACETAM 1500 MILLIGRAM(S): 100 SOLUTION ORAL at 05:01

## 2025-01-01 RX ADMIN — Medication 2 UNIT(S): at 17:30

## 2025-01-01 RX ADMIN — Medication 20 MILLIGRAM(S): at 06:49

## 2025-01-01 RX ADMIN — PIPERACILLIN AND TAZOBACTAM 25 GRAM(S): 3; .375 INJECTION, POWDER, LYOPHILIZED, FOR SOLUTION INTRAVENOUS at 05:05

## 2025-01-01 RX ADMIN — LACOSAMIDE 200 MILLIGRAM(S): 100 TABLET, FILM COATED ORAL at 09:27

## 2025-01-01 RX ADMIN — Medication 1: at 18:07

## 2025-01-01 RX ADMIN — PIPERACILLIN AND TAZOBACTAM 25 GRAM(S): 3; .375 INJECTION, POWDER, LYOPHILIZED, FOR SOLUTION INTRAVENOUS at 14:11

## 2025-01-01 RX ADMIN — AMANTADINE HYDROCHLORIDE 50 MILLIGRAM(S): 100 CAPSULE ORAL at 07:23

## 2025-01-01 RX ADMIN — SODIUM ZIRCONIUM CYCLOSILICATE 10 GRAM(S): 10 POWDER, FOR SUSPENSION ORAL at 06:32

## 2025-01-01 RX ADMIN — LEVETIRACETAM 1500 MILLIGRAM(S): 100 SOLUTION ORAL at 18:47

## 2025-01-01 RX ADMIN — LABETALOL HCL 100 MILLIGRAM(S): 300 TABLET, FILM COATED ORAL at 21:34

## 2025-01-01 RX ADMIN — HEPARIN SODIUM 5000 UNIT(S): 1000 INJECTION, SOLUTION INTRAVENOUS; SUBCUTANEOUS at 17:04

## 2025-01-01 RX ADMIN — LABETALOL HCL 100 MILLIGRAM(S): 300 TABLET, FILM COATED ORAL at 06:02

## 2025-01-01 RX ADMIN — LACOSAMIDE 200 MILLIGRAM(S): 100 TABLET, FILM COATED ORAL at 22:42

## 2025-01-01 RX ADMIN — Medication 40 MILLIGRAM(S): at 06:00

## 2025-01-01 RX ADMIN — HEPARIN SODIUM 5000 UNIT(S): 1000 INJECTION, SOLUTION INTRAVENOUS; SUBCUTANEOUS at 17:01

## 2025-01-01 RX ADMIN — AMANTADINE HYDROCHLORIDE 50 MILLIGRAM(S): 100 CAPSULE ORAL at 17:01

## 2025-01-01 RX ADMIN — LEVETIRACETAM 1500 MILLIGRAM(S): 100 SOLUTION ORAL at 06:02

## 2025-01-01 RX ADMIN — LEVETIRACETAM 1500 MILLIGRAM(S): 100 SOLUTION ORAL at 05:57

## 2025-01-01 RX ADMIN — Medication 2 UNIT(S): at 16:38

## 2025-01-01 RX ADMIN — LACOSAMIDE 200 MILLIGRAM(S): 100 TABLET, FILM COATED ORAL at 06:02

## 2025-01-01 RX ADMIN — CHLORHEXIDINE GLUCONATE 1 APPLICATION(S): 1.2 RINSE ORAL at 11:45

## 2025-01-01 RX ADMIN — Medication 2: at 06:18

## 2025-01-01 RX ADMIN — LEVETIRACETAM 1500 MILLIGRAM(S): 100 SOLUTION ORAL at 06:32

## 2025-01-01 RX ADMIN — LEVETIRACETAM 1500 MILLIGRAM(S): 100 SOLUTION ORAL at 18:01

## 2025-01-01 RX ADMIN — POLYSORBATE 80 1 DROP(S): 100 SOLUTION/ DROPS OPHTHALMIC at 05:17

## 2025-01-01 RX ADMIN — PIPERACILLIN AND TAZOBACTAM 25 GRAM(S): 3; .375 INJECTION, POWDER, LYOPHILIZED, FOR SOLUTION INTRAVENOUS at 13:16

## 2025-01-01 RX ADMIN — HEPARIN SODIUM 5000 UNIT(S): 1000 INJECTION, SOLUTION INTRAVENOUS; SUBCUTANEOUS at 18:44

## 2025-01-01 RX ADMIN — Medication 3 UNIT(S): at 17:48

## 2025-01-01 RX ADMIN — INSULIN HUMAN 10 UNIT(S): 100 INJECTION, SOLUTION SUBCUTANEOUS at 02:05

## 2025-01-01 RX ADMIN — Medication 2 MILLIGRAM(S): at 12:07

## 2025-01-01 RX ADMIN — AMANTADINE HYDROCHLORIDE 50 MILLIGRAM(S): 100 CAPSULE ORAL at 18:26

## 2025-01-01 RX ADMIN — LACOSAMIDE 200 MILLIGRAM(S): 100 TABLET, FILM COATED ORAL at 05:13

## 2025-01-01 RX ADMIN — POLYSORBATE 80 1 DROP(S): 100 SOLUTION/ DROPS OPHTHALMIC at 13:32

## 2025-01-01 RX ADMIN — SODIUM ZIRCONIUM CYCLOSILICATE 10 GRAM(S): 10 POWDER, FOR SUSPENSION ORAL at 07:38

## 2025-01-01 RX ADMIN — Medication 40 MILLIGRAM(S): at 05:05

## 2025-01-01 RX ADMIN — AMANTADINE HYDROCHLORIDE 50 MILLIGRAM(S): 100 CAPSULE ORAL at 17:49

## 2025-01-01 RX ADMIN — HEPARIN SODIUM 5000 UNIT(S): 1000 INJECTION, SOLUTION INTRAVENOUS; SUBCUTANEOUS at 06:18

## 2025-01-01 RX ADMIN — CEFTRIAXONE SODIUM 100 MILLIGRAM(S): 1 INJECTION, POWDER, FOR SOLUTION INTRAMUSCULAR; INTRAVENOUS at 11:21

## 2025-01-01 RX ADMIN — AMANTADINE HYDROCHLORIDE 50 MILLIGRAM(S): 100 CAPSULE ORAL at 05:59

## 2025-01-01 RX ADMIN — Medication 20 MILLIGRAM(S): at 05:16

## 2025-01-01 RX ADMIN — INSULIN HUMAN 10 UNIT(S): 100 INJECTION, SOLUTION SUBCUTANEOUS at 07:38

## 2025-01-01 RX ADMIN — PIPERACILLIN AND TAZOBACTAM 25 GRAM(S): 3; .375 INJECTION, POWDER, LYOPHILIZED, FOR SOLUTION INTRAVENOUS at 23:06

## 2025-01-01 RX ADMIN — HEPARIN SODIUM 5000 UNIT(S): 1000 INJECTION, SOLUTION INTRAVENOUS; SUBCUTANEOUS at 17:32

## 2025-01-01 RX ADMIN — Medication 2 MILLIGRAM(S): at 12:25

## 2025-01-01 RX ADMIN — LEVETIRACETAM 1500 MILLIGRAM(S): 100 SOLUTION ORAL at 05:18

## 2025-01-01 RX ADMIN — PANTOPRAZOLE 40 MILLIGRAM(S): 40 TABLET, DELAYED RELEASE ORAL at 11:08

## 2025-01-01 RX ADMIN — HEPARIN SODIUM 5000 UNIT(S): 1000 INJECTION, SOLUTION INTRAVENOUS; SUBCUTANEOUS at 17:22

## 2025-01-01 RX ADMIN — Medication 2 MILLIGRAM(S): at 11:28

## 2025-01-01 RX ADMIN — HEPARIN SODIUM 5000 UNIT(S): 1000 INJECTION, SOLUTION INTRAVENOUS; SUBCUTANEOUS at 05:02

## 2025-01-01 RX ADMIN — POLYSORBATE 80 1 DROP(S): 100 SOLUTION/ DROPS OPHTHALMIC at 13:55

## 2025-01-01 RX ADMIN — LEVETIRACETAM 1500 MILLIGRAM(S): 100 SOLUTION ORAL at 17:00

## 2025-01-01 RX ADMIN — AMANTADINE HYDROCHLORIDE 50 MILLIGRAM(S): 100 CAPSULE ORAL at 06:36

## 2025-01-01 RX ADMIN — POLYSORBATE 80 1 DROP(S): 100 SOLUTION/ DROPS OPHTHALMIC at 06:21

## 2025-01-01 RX ADMIN — POLYSORBATE 80 1 DROP(S): 100 SOLUTION/ DROPS OPHTHALMIC at 11:23

## 2025-01-01 RX ADMIN — Medication 3 UNIT(S): at 21:35

## 2025-01-01 RX ADMIN — Medication 3 UNIT(S): at 22:32

## 2025-01-01 RX ADMIN — Medication 5 MILLIGRAM(S): at 09:20

## 2025-01-01 RX ADMIN — SODIUM ZIRCONIUM CYCLOSILICATE 10 GRAM(S): 10 POWDER, FOR SUSPENSION ORAL at 22:55

## 2025-01-01 RX ADMIN — PIPERACILLIN AND TAZOBACTAM 25 GRAM(S): 3; .375 INJECTION, POWDER, LYOPHILIZED, FOR SOLUTION INTRAVENOUS at 05:12

## 2025-01-01 RX ADMIN — LACOSAMIDE 200 MILLIGRAM(S): 100 TABLET, FILM COATED ORAL at 18:45

## 2025-01-01 RX ADMIN — POLYSORBATE 80 1 DROP(S): 100 SOLUTION/ DROPS OPHTHALMIC at 21:16

## 2025-01-01 RX ADMIN — INSULIN GLARGINE-YFGN 5 UNIT(S): 100 INJECTION, SOLUTION SUBCUTANEOUS at 09:27

## 2025-01-01 RX ADMIN — Medication 3 UNIT(S): at 11:56

## 2025-01-01 RX ADMIN — Medication 2 UNIT(S): at 07:48

## 2025-01-01 RX ADMIN — Medication 10 MILLIGRAM(S): at 05:01

## 2025-01-01 RX ADMIN — HEPARIN SODIUM 5000 UNIT(S): 1000 INJECTION, SOLUTION INTRAVENOUS; SUBCUTANEOUS at 17:02

## 2025-01-01 RX ADMIN — PIPERACILLIN AND TAZOBACTAM 25 GRAM(S): 3; .375 INJECTION, POWDER, LYOPHILIZED, FOR SOLUTION INTRAVENOUS at 21:02

## 2025-01-01 RX ADMIN — AMANTADINE HYDROCHLORIDE 50 MILLIGRAM(S): 100 CAPSULE ORAL at 17:04

## 2025-01-01 RX ADMIN — LABETALOL HCL 100 MILLIGRAM(S): 300 TABLET, FILM COATED ORAL at 06:00

## 2025-01-01 RX ADMIN — Medication 10 MILLIGRAM(S): at 06:26

## 2025-01-01 RX ADMIN — HEPARIN SODIUM 5000 UNIT(S): 1000 INJECTION, SOLUTION INTRAVENOUS; SUBCUTANEOUS at 18:08

## 2025-01-01 RX ADMIN — Medication 1: at 12:05

## 2025-01-01 RX ADMIN — Medication 2: at 11:47

## 2025-01-01 RX ADMIN — AMANTADINE HYDROCHLORIDE 50 MILLIGRAM(S): 100 CAPSULE ORAL at 20:52

## 2025-01-01 RX ADMIN — LACOSAMIDE 200 MILLIGRAM(S): 100 TABLET, FILM COATED ORAL at 05:06

## 2025-01-01 RX ADMIN — PANTOPRAZOLE 40 MILLIGRAM(S): 40 TABLET, DELAYED RELEASE ORAL at 13:39

## 2025-01-01 NOTE — CONSULT NOTE ADULT - ASSESSMENT
79-year-old woman with PMHx MDS followed by Dr. Wade (requiring periodic transfusions for anemia), DM, HTN, CKD 2, and multiple recent admissions for a b/l subdural hematomas (SDH) with mass effect and midline shift (status-post evacuation and middle meningeal artery embolization complicated by a leaking craniotomy site and concern for status epilepticus) sent from [facility] for transfusion for Hb 5.6S  Being treated for UTI      # MDS, transfusion dependent  # Worsening anemia  # Iron overload      She was previously on REvlimid and Vidaza, currently on Lusparacept with   Transfusion: 10/16, 10/21, 11/1, 11/7, 11/14, 12/10, 12/31  Last dose of Luspatercept documented 10/1/34  Previously on retacrit   79-year-old woman with PMHx MDS followed by Dr. Wade (requiring periodic transfusions for anemia), DM, HTN, CKD 2, and multiple recent admissions for a b/l subdural hematomas (SDH) with mass effect and midline shift (status-post evacuation and middle meningeal artery embolization complicated by a leaking craniotomy site and concern for status epilepticus) sent from [facility] for transfusion for Hb 5.6  Being treated for UTI      # Low rish MDS, transfusion dependent  # Worsening anemia  # Iron overload  She was previously on Revlimid and Vidaza, currently on Lusparacept   Last dose of Luspatercept documented was 10/1/34  Previously on retacrit    Plan:  Continue supportive care as per primary team  Transfuse to keep Hb > 7  Outpatient follow up with Dr. Wade upon discharge for possibly starting imetelstat

## 2025-01-01 NOTE — CONSULT NOTE ADULT - SUBJECTIVE AND OBJECTIVE BOX
Hematology Consult Note    HPI:  Case of 79-year-old woman with PMHx of myelodysplastic syndrome (MDS) followed by Dr. Wade (requiring periodic transfusions for anemia), DM, HTN, CKD 2, and multiple recent admissions for a b/l subdural hematomas (SDH) with mass effect and midline shift (status-post evacuation and middle meningeal artery embolization complicated by a leaking craniotomy site and concern for status epilepticus) presented to the ED anemia.    Patient was at [facility] was found to be anemic to 5.6 being sent to ED for transfusion.   She is nonverbal, bedbound, and has a PEG tube.   I am unable to obtain a comprehensive history, review of systems, past medical history, and/or physical exam due to constraints imposed by the patient's clinical condition and/or mental status.    Upon arrival to unit, patient noted to have diarrhea, non bloody. Also noted to be on loperamide PRN.    In the ED, she was grossly stable. Later developed a fever of 100.5    Labs: Hb 5.4, potassium 7.6  UA suggestive of infection    In the ED, given 1.5L NS + Insulin/d50/Calcium/lasix/albuterol       (31 Dec 2024 22:42)      Allergies    No Known Allergies    Intolerances        MEDICATIONS  (STANDING):  albuterol    90 MICROgram(s) HFA Inhaler 2 Puff(s) Inhalation every 6 hours  amantadine Syrup 50 milliGRAM(s) Oral two times a day  amLODIPine   Tablet 5 milliGRAM(s) Oral daily  ampicillin  IVPB 2 Gram(s) IV Intermittent every 6 hours  cefTRIAXone   IVPB 1000 milliGRAM(s) IV Intermittent every 24 hours  chlorhexidine 2% Cloths 1 Application(s) Topical daily  glucagon  Injectable 1 milliGRAM(s) IntraMuscular once  heparin   Injectable 5000 Unit(s) SubCutaneous every 12 hours  insulin glargine Injectable (LANTUS) 5 Unit(s) SubCutaneous at bedtime  insulin lispro (ADMELOG) corrective regimen sliding scale   SubCutaneous three times a day before meals  insulin lispro Injectable (ADMELOG) 2 Unit(s) SubCutaneous three times a day before meals  lacosamide Solution 200 milliGRAM(s) Oral two times a day  levETIRAcetam  Solution 1500 milliGRAM(s) Oral two times a day  pantoprazole   Suspension 40 milliGRAM(s) Oral daily  sodium chloride 0.9%. 1000 milliLiter(s) (75 mL/Hr) IV Continuous <Continuous>  sodium zirconium cyclosilicate 10 Gram(s) Oral every 8 hours    MEDICATIONS  (PRN):      PAST MEDICAL & SURGICAL HISTORY:  DM (diabetes mellitus)      MDS (myelodysplastic syndrome)      SDH (subdural hematoma)      Seizure disorder      Chronic kidney disease (CKD)      H/O colonoscopy      S/P percutaneous endoscopic gastrostomy (PEG) tube placement      S/P craniotomy          FAMILY HISTORY:      SOCIAL HISTORY: No EtOH, no tobacco    REVIEW OF SYSTEMS:    CONSTITUTIONAL: No weakness, fevers or chills  EYES/ENT: No visual changes;  No vertigo or throat pain   NECK: No pain or stiffness  RESPIRATORY: No cough, wheezing, hemoptysis; No shortness of breath  CARDIOVASCULAR: No chest pain or palpitations  GASTROINTESTINAL: No abdominal or epigastric pain. No nausea, vomiting, or hematemesis; No diarrhea or constipation. No melena or hematochezia.  GENITOURINARY: No dysuria, frequency or hematuria  NEUROLOGICAL: No numbness or weakness  SKIN: No itching, burning, rashes, or lesions   All other review of systems is negative unless indicated above.    Height (cm): 154.9 (01-01 @ 00:00)  Weight (kg): 65.9 (01-01 @ 00:00)  BMI (kg/m2): 27.5 (01-01 @ 00:00)  BSA (m2): 1.65 (01-01 @ 00:00)    T(F): 98.4 (01-01-25 @ 04:00), Max: 100.5 (12-31-24 @ 21:26)  HR: 85 (01-01-25 @ 05:00)  BP: 170/72 (01-01-25 @ 05:00)  RR: 31 (01-01-25 @ 05:00)  SpO2: 100% (01-01-25 @ 05:00)  Wt(kg): --    GENERAL: NAD, well-developed  HEAD:  Atraumatic, Normocephalic  EYES: EOMI, PERRLA, conjunctiva and sclera clear  NECK: Supple, No JVD  CHEST/LUNG: Clear to auscultation bilaterally; No wheeze  HEART: Regular rate and rhythm; No murmurs, rubs, or gallops  ABDOMEN: Soft, Nontender, Nondistended; Bowel sounds present  EXTREMITIES:  2+ Peripheral Pulses, No clubbing, cyanosis, or edema  NEUROLOGY: non-focal  SKIN: No rashes or lesions                          9.7    10.87 )-----------( 299      ( 01 Jan 2025 04:24 )             30.1       01-01    146  |  113[H]  |  x   ----------------------------<  216[H]  x    |  21  |  1.2    Ca    9.9      01 Jan 2025 04:24  Phos  4.9     01-01  Mg     2.0     01-01    TPro  5.5[L]  /  Alb  3.4[L]  /  TBili  0.9  /  DBili  x   /  AST  21  /  ALT  16  /  AlkPhos  104  01-01      Magnesium: 2.0 mg/dL (01-01 @ 04:24)  Phosphorus: 4.9 mg/dL (01-01 @ 04:24)  Lactate Dehydrogenase, Serum: 276 (01-01 @ 04:24)           Hematology Consult Note    HPI:  Case of 79-year-old woman with PMHx of myelodysplastic syndrome (MDS) followed by Dr. Wade (requiring periodic transfusions for anemia), DM, HTN, CKD 2, and multiple recent admissions for a b/l subdural hematomas (SDH) with mass effect and midline shift (status-post evacuation and middle meningeal artery embolization complicated by a leaking craniotomy site and concern for status epilepticus) presented to the ED anemia.    Patient was at [facility] was found to be anemic to 5.6 being sent to ED for transfusion.   She is nonverbal, bedbound, and has a PEG tube.   I am unable to obtain a comprehensive history, review of systems, past medical history, and/or physical exam due to constraints imposed by the patient's clinical condition and/or mental status.    Upon arrival to unit, patient noted to have diarrhea, non bloody. Also noted to be on loperamide PRN.    In the ED, she was grossly stable. Later developed a fever of 100.5    Labs: Hb 5.4, potassium 7.6  UA suggestive of infection    In the ED, given 1.5L NS + Insulin/d50/Calcium/lasix/albuterol       (31 Dec 2024 22:42)      Allergies    No Known Allergies    Intolerances        MEDICATIONS  (STANDING):  albuterol    90 MICROgram(s) HFA Inhaler 2 Puff(s) Inhalation every 6 hours  amantadine Syrup 50 milliGRAM(s) Oral two times a day  amLODIPine   Tablet 5 milliGRAM(s) Oral daily  ampicillin  IVPB 2 Gram(s) IV Intermittent every 6 hours  cefTRIAXone   IVPB 1000 milliGRAM(s) IV Intermittent every 24 hours  chlorhexidine 2% Cloths 1 Application(s) Topical daily  glucagon  Injectable 1 milliGRAM(s) IntraMuscular once  heparin   Injectable 5000 Unit(s) SubCutaneous every 12 hours  insulin glargine Injectable (LANTUS) 5 Unit(s) SubCutaneous at bedtime  insulin lispro (ADMELOG) corrective regimen sliding scale   SubCutaneous three times a day before meals  insulin lispro Injectable (ADMELOG) 2 Unit(s) SubCutaneous three times a day before meals  lacosamide Solution 200 milliGRAM(s) Oral two times a day  levETIRAcetam  Solution 1500 milliGRAM(s) Oral two times a day  pantoprazole   Suspension 40 milliGRAM(s) Oral daily  sodium chloride 0.9%. 1000 milliLiter(s) (75 mL/Hr) IV Continuous <Continuous>  sodium zirconium cyclosilicate 10 Gram(s) Oral every 8 hours    MEDICATIONS  (PRN):      PAST MEDICAL & SURGICAL HISTORY:  DM (diabetes mellitus)      MDS (myelodysplastic syndrome)      SDH (subdural hematoma)      Seizure disorder      Chronic kidney disease (CKD)      H/O colonoscopy      S/P percutaneous endoscopic gastrostomy (PEG) tube placement      S/P craniotomy          FAMILY HISTORY:      SOCIAL HISTORY: No EtOH, no tobacco    REVIEW OF SYSTEMS:    CONSTITUTIONAL: No weakness, fevers or chills  EYES/ENT: No visual changes;  No vertigo or throat pain   NECK: No pain or stiffness  RESPIRATORY: No cough, wheezing, hemoptysis; No shortness of breath  CARDIOVASCULAR: No chest pain or palpitations  GASTROINTESTINAL: No abdominal or epigastric pain. No nausea, vomiting, or hematemesis; No diarrhea or constipation. No melena or hematochezia.  GENITOURINARY: No dysuria, frequency or hematuria  NEUROLOGICAL: No numbness or weakness  SKIN: No itching, burning, rashes, or lesions   All other review of systems is negative unless indicated above.    Height (cm): 154.9 (01-01 @ 00:00)  Weight (kg): 65.9 (01-01 @ 00:00)  BMI (kg/m2): 27.5 (01-01 @ 00:00)  BSA (m2): 1.65 (01-01 @ 00:00)    T(F): 98.4 (01-01-25 @ 04:00), Max: 100.5 (12-31-24 @ 21:26)  HR: 85 (01-01-25 @ 05:00)  BP: 170/72 (01-01-25 @ 05:00)  RR: 31 (01-01-25 @ 05:00)  SpO2: 100% (01-01-25 @ 05:00)  Wt(kg): --    GENERAL: NAD  CHEST/LUNG: Clear to auscultation bilaterally; No wheeze  HEART: Regular rate and rhythm;  ABDOMEN: Soft, Nontender, Nondistended;   EXTREMITIES:  2+ Peripheral Pulses, No clubbing, cyanosis, or edema  NEUROLOGY: non verbal, awake but non responsive, unable to assess                          9.7    10.87 )-----------( 299      ( 01 Jan 2025 04:24 )             30.1       01-01    146  |  113[H]  |  x   ----------------------------<  216[H]  x    |  21  |  1.2    Ca    9.9      01 Jan 2025 04:24  Phos  4.9     01-01  Mg     2.0     01-01    TPro  5.5[L]  /  Alb  3.4[L]  /  TBili  0.9  /  DBili  x   /  AST  21  /  ALT  16  /  AlkPhos  104  01-01      Magnesium: 2.0 mg/dL (01-01 @ 04:24)  Phosphorus: 4.9 mg/dL (01-01 @ 04:24)  Lactate Dehydrogenase, Serum: 276 (01-01 @ 04:24)

## 2025-01-01 NOTE — PATIENT PROFILE ADULT - FALL HARM RISK - HARM RISK INTERVENTIONS

## 2025-01-01 NOTE — PROGRESS NOTE ADULT - ASSESSMENT
79-year-old woman with PMHx of myelodysplastic syndrome (MDS) followed by Dr. Wade (requiring periodic transfusions for anemia), DM, HTN, CKD 2, and multiple recent admissions for a b/l subdural hematomas (SDH) with mass effect and midline shift (status-post evacuation and middle meningeal artery embolization complicated by a leaking craniotomy site and concern for status epilepticus) presented to the ED anemia.    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   #CKD2 (Creat at baseline)  #Fever due to possible UTI  #Stage 3 sacral ulcer  #Diarrhea  #HTN  #DM  #Hx of SDH (sp evacuation and middle meningeal artery embolization complicated by a leaking craniotomy site and concern for status epilepticus)      PLAN:    CNS: Avoid CNS Depressants. c/w home antiepileptics.    HEENT: Oral care. Aspiration precautions     PULMONARY: HOB @ 45. Monitor Pulse Ox. Keep > 93%. Supplement as needed.    CARDIOVASCULAR:   - Daily Weight. Strict I&Os. Keep I < O  - sp Lasix in ED for hyperkalemia  - Hold home lasix  - NS IVF for 12 hours  - c/w amlodipine for HTN  - Hold lisinopril  - Hold labetalol for now, resume if BP uncontrolled and potassium controlled    GI: GI prophylaxis. PEG feeding nepro. Hold laxatives. Get C diff if diarrhea persistent.    RENAL: Standing lokelma. Repeat EKG. Will discuss with nephro if potassium does not drop below 6 sp treatment given in ED. Hold all meds that may cause hyperkalemia, no potassium feeding per peg. Nephrology eval. Get CK level    INFECTIOUS DISEASE: Empiric Rocephin + ampicillin given history of enterococcal UTI. ID eval. Procal. Sacral ulcer does not look infected.    HEMATOLOGICAL: DVT prophylaxis since no active bleed. Transfuse 2u today and repeat CBC. Hematology eval. Hemolytic panel. Duplex    ENDOCRINE: Monitor FS. Resume home insulin regimen.    MUSCULOSKELETAL: bedrest. Burn eval for sacral ulcer.    CODE STATUS: FULL    DISPOSITION: MICU

## 2025-01-01 NOTE — PROGRESS NOTE ADULT - SUBJECTIVE AND OBJECTIVE BOX
SUBJECTIVE/OVERNIGHT EVENTS  Today is hospital day 1d. This morning patient was seen and examined at bedside, resting comfortably in bed. No acute or major events overnight.    HOSPITAL COURSE      CODE STATUS:    FAMILY COMMUNICATION  Contact date:  Name of person contacted:  Relationship to patient:  Communication details:    MEDICATIONS  STANDING MEDICATIONS  albuterol    90 MICROgram(s) HFA Inhaler 2 Puff(s) Inhalation every 6 hours  amantadine Syrup 50 milliGRAM(s) Oral two times a day  amLODIPine   Tablet 5 milliGRAM(s) Oral daily  ampicillin  IVPB 2 Gram(s) IV Intermittent every 6 hours  calcium gluconate IVPB 2 Gram(s) IV Intermittent Once  cefTRIAXone   IVPB 1000 milliGRAM(s) IV Intermittent every 24 hours  dextrose 50% Injectable 50 milliLiter(s) IV Push once  glucagon  Injectable 1 milliGRAM(s) IntraMuscular once  heparin   Injectable 5000 Unit(s) SubCutaneous every 12 hours  insulin glargine Injectable (LANTUS) 5 Unit(s) SubCutaneous at bedtime  insulin lispro (ADMELOG) corrective regimen sliding scale   SubCutaneous three times a day before meals  insulin lispro Injectable (ADMELOG) 2 Unit(s) SubCutaneous three times a day before meals  insulin regular  human recombinant 10 Unit(s) IV Push once  lacosamide Solution 200 milliGRAM(s) Oral two times a day  levETIRAcetam  Solution 1500 milliGRAM(s) Oral two times a day  pantoprazole   Suspension 40 milliGRAM(s) Oral daily  sodium chloride 0.9%. 1000 milliLiter(s) IV Continuous <Continuous>  sodium zirconium cyclosilicate 10 Gram(s) Oral every 8 hours  sodium zirconium cyclosilicate 10 Gram(s) Oral once    PRN MEDICATIONS    VITALS  T(F): 99.3 (01-01-25 @ 00:00), Max: 100.5 (12-31-24 @ 21:26)  HR: 82 (01-01-25 @ 00:00) (82 - 93)  BP: 136/61 (01-01-25 @ 00:00) (116/51 - 136/61)  RR: 22 (01-01-25 @ 00:00) (16 - 22)  SpO2: 100% (01-01-25 @ 00:00) (100% - 100%)  POCT Blood Glucose.: 337 mg/dL (12-31-24 @ 23:29)    PHYSICAL EXAM  GENERAL  (  ) NAD, lying in bed comfortably     (  ) obtunded     (  ) lethargic     (  ) somnolent    HEAD  (  ) Atraumatic     (  ) hematoma     (  ) laceration (specify location:       )     NECK  (  ) Supple     (  ) neck stiffness     (  ) nuchal rigidity     (  )  no JVD     (  ) JVD present ( -- cm)    HEART  Rate -->  (  ) normal rate    (  ) bradycardic    (  ) tachycardic  Rhythm -->  (  ) regular    (  ) regularly irregular    (  ) irregularly irregular  Murmurs -->  (  ) normal s1/s2    (  ) systolic murmur    (  ) diastolic murmur    (  ) continuous murmur     (  ) S3 present    (  ) S4 present    LUNGS  (  )Unlabored respirations     (  ) tachypnea  (  ) B/L air entry     (  ) decreased breath sounds in:  (location     )    (  ) no adventitious sound     (  ) crackles     (  ) wheezing      (  ) rhonchi      (specify location:       )  (  ) chest wall tenderness (specify location:       )    ABDOMEN  (  ) Soft     (  ) tense   |   (  ) nondistended     (  ) distended   |   (  ) +BS     (  ) hypoactive bowel sounds     (  ) hyperactive bowel sounds  (  ) nontender     (  ) RUQ tenderness     (  ) RLQ tenderness     (  ) LLQ tenderness     (  ) epigastric tenderness     (  ) diffuse tenderness  (  ) Splenomegaly      (  ) Hepatomegaly      (  ) Jaundice     (  ) ecchymosis     EXTREMITIES  (  ) Normal     (  ) Rash     (  ) ecchymosis     (  ) varicose veins      (  ) pitting edema     (  ) non-pitting edema   (  ) ulceration     (  ) gangrene:     (location:     )    NERVOUS SYSTEM  (  ) A&Ox3     (  ) confused     (  ) lethargic  CN II-XII:     (  ) Intact     (  ) focal deficits  (Specify:     )   Upper extremities:     (  ) strength X/5     (  ) focal deficit (specify:    )  Lower extremities:     (  ) strength  X/5    (  ) focal deficit (specify:    )    SKIN  (  ) No rashes or lesions     (  ) maculopapular rash     (  ) pustules     (  ) vesicles     (  ) ulcer     (  ) ecchymosis     (specify location:     )    (  ) Indwelling Small Catheter   Date insterted:    Reason (  ) Critical illness     (  ) urinary retention    (  ) Accurate Ins/Outs Monitoring     (  ) CMO patient    (  ) Central Line  Date inserted:  Location: (  ) Right IJ   (  ) Left IJ   (  ) Right Fem   (  ) Left Fem    (  ) SPC  (  ) pigtail  (  ) PEG tube  (  ) colostomy  (  ) jejunostomy  (  ) U-Dall    LABS             5.8    9.04  )-----------( 349      ( 12-31-24 @ 18:55 )             18.9     146  |  112  |  61  -------------------------<  291   01-01-25 @ 00:25  6.8  |  23  |  1.2    Ca      9.9     01-01-25 @ 00:25    TPro  5.6  /  Alb  3.4  /  TBili  0.4  /  DBili  x   /  AST  17  /  ALT  15  /  AlkPhos  110  /  GGT  x     12-31-24 @ 18:18        Urinalysis Basic - ( 01 Jan 2025 00:25 )    Color: x / Appearance: x / SG: x / pH: x  Gluc: 291 mg/dL / Ketone: x  / Bili: x / Urobili: x   Blood: x / Protein: x / Nitrite: x   Leuk Esterase: x / RBC: x / WBC x   Sq Epi: x / Non Sq Epi: x / Bacteria: x      ABG - ( 01 Jan 2025 00:39 )  pH, Arterial: 7.37  pH, Blood: x     /  pCO2: 44    /  pO2: 133   / HCO3: 25    / Base Excess: -0.2  /  SaO2: 99.8                Urinalysis with Rflx Culture (collected 31 Dec 2024 21:53)      IMAGING

## 2025-01-01 NOTE — CONSULT NOTE ADULT - SUBJECTIVE AND OBJECTIVE BOX
Patient is a 79y old  Female who presents with a chief complaint of     HPI:  Case of 79-year-old woman with PMHx of myelodysplastic syndrome (MDS) followed by Dr. Wade (requiring periodic transfusions for anemia), DM, HTN, CKD 2, and multiple recent admissions for a b/l subdural hematomas (SDH) with mass effect and midline shift (status-post evacuation and middle meningeal artery embolization complicated by a leaking craniotomy site and concern for status epilepticus) presented to the ED anemia.    Patient was at [facility] was found to be anemic to 5.6 being sent to ED for transfusion.   She is nonverbal, bedbound, and has a PEG tube.   I am unable to obtain a comprehensive history, review of systems, past medical history, and/or physical exam due to constraints imposed by the patient's clinical condition and/or mental status.    Upon arrival to unit, patient noted to have diarrhea, non bloody. Also noted to be on loperamide PRN.    In the ED, she was grossly stable. Later developed a fever of 100.5    Labs: Hb 5.4, potassium 7.6  UA suggestive of infection    In the ED, given 1.5L NS + Insulin/d50/Calcium/lasix/albuterol       (31 Dec 2024 22:42)      PAST MEDICAL & SURGICAL HISTORY:  DM (diabetes mellitus)      MDS (myelodysplastic syndrome)      SDH (subdural hematoma)      Seizure disorder      Chronic kidney disease (CKD)      H/O colonoscopy      S/P percutaneous endoscopic gastrostomy (PEG) tube placement      S/P craniotomy            FAMILY HISTORY:  :  No known cardiovacular family hisotry     ROS:  See HPI     Allergies    No Known Allergies    Intolerances          PHYSICAL EXAM    ICU Vital Signs Last 24 Hrs  T(C): 36.9 (01 Jan 2025 04:00), Max: 38.1 (31 Dec 2024 21:26)  T(F): 98.4 (01 Jan 2025 04:00), Max: 100.5 (31 Dec 2024 21:26)  HR: 87 (01 Jan 2025 06:00) (82 - 93)  BP: 170/71 (01 Jan 2025 06:00) (116/51 - 170/72)  BP(mean): 102 (01 Jan 2025 06:00) (88 - 104)  ABP: --  ABP(mean): --  RR: 20 (01 Jan 2025 06:00) (15 - 31)  SpO2: 99% (01 Jan 2025 06:00) (99% - 100%)    O2 Parameters below as of 01 Jan 2025 06:00  Patient On (Oxygen Delivery Method): nasal cannula  O2 Flow (L/min): 3          General: In NAD ; non verbal ; bedbound   HEENT:  SID              Lymphatic system: No cervical LN   Lungs: Bilateral BS, clear anteriorly   Cardiovascular: Regular  Gastrointestinal: Soft, Positive BS; PEG   Musculoskeletal: No clubbing.  No movement  Skin: Warm.  Intact  Neurological: AAO 0   stage 3 decubitus       12-31-24 @ 07:01  -  01-01-25 @ 07:00  --------------------------------------------------------  IN:    IV PiggyBack: 200 mL    PRBCs (Packed Red Blood Cells): 346 mL    sodium chloride 0.9%: 525 mL  Total IN: 1071 mL    OUT:    Indwelling Catheter - Urethral (mL): 615 mL  Total OUT: 615 mL    Total NET: 456 mL          LABS:                          9.7    10.87 )-----------( 299      ( 01 Jan 2025 04:24 )             30.1                                               01-01    146  |  113[H]  |  61[HH]  ----------------------------<  216[H]  6.6[HH]   |  21  |  1.2    Ca    9.9      01 Jan 2025 04:24  Phos  4.9     01-01  Mg     2.0     01-01    TPro  5.5[L]  /  Alb  3.4[L]  /  TBili  0.9  /  DBili  x   /  AST  21  /  ALT  16  /  AlkPhos  104  01-01                                             Urinalysis Basic - ( 01 Jan 2025 04:24 )    Color: x / Appearance: x / SG: x / pH: x  Gluc: 216 mg/dL / Ketone: x  / Bili: x / Urobili: x   Blood: x / Protein: x / Nitrite: x   Leuk Esterase: x / RBC: x / WBC x   Sq Epi: x / Non Sq Epi: x / Bacteria: x                                                  LIVER FUNCTIONS - ( 01 Jan 2025 04:24 )  Alb: 3.4 g/dL / Pro: 5.5 g/dL / ALK PHOS: 104 U/L / ALT: 16 U/L / AST: 21 U/L / GGT: x                                                  Urinalysis with Rflx Culture (collected 31 Dec 2024 21:53)                                                                                       ABG - ( 01 Jan 2025 00:39 )  pH, Arterial: 7.37  pH, Blood: x     /  pCO2: 44    /  pO2: 133   / HCO3: 25    / Base Excess: -0.2  /  SaO2: 99.8                X-Rays                                                                                     ECHO    MEDICATIONS  (STANDING):  albuterol    90 MICROgram(s) HFA Inhaler 2 Puff(s) Inhalation every 6 hours  amantadine Syrup 50 milliGRAM(s) Oral two times a day  amLODIPine   Tablet 5 milliGRAM(s) Oral daily  ampicillin  IVPB 2 Gram(s) IV Intermittent every 6 hours  cefTRIAXone   IVPB 1000 milliGRAM(s) IV Intermittent every 24 hours  chlorhexidine 2% Cloths 1 Application(s) Topical daily  glucagon  Injectable 1 milliGRAM(s) IntraMuscular once  heparin   Injectable 5000 Unit(s) SubCutaneous every 12 hours  insulin glargine Injectable (LANTUS) 5 Unit(s) SubCutaneous at bedtime  insulin lispro (ADMELOG) corrective regimen sliding scale   SubCutaneous three times a day before meals  insulin lispro Injectable (ADMELOG) 2 Unit(s) SubCutaneous three times a day before meals  lacosamide Solution 200 milliGRAM(s) Oral two times a day  levETIRAcetam  Solution 1500 milliGRAM(s) Oral two times a day  pantoprazole   Suspension 40 milliGRAM(s) Oral daily  sodium chloride 0.9%. 1000 milliLiter(s) (75 mL/Hr) IV Continuous <Continuous>  sodium zirconium cyclosilicate 10 Gram(s) Oral every 8 hours    MEDICATIONS  (PRN):

## 2025-01-01 NOTE — CONSULT NOTE ADULT - ASSESSMENT
ASSESSMENT  This is a 79-year-old woman with PMHx of myelodysplastic syndrome (MDS) ,DM, HTN, CKD 2, and multiple recent admissions for a b/l subdural hematomas (SDH) with mass effect and midline shift (status-post evacuation and middle meningeal artery embolization complicated by a leaking craniotomy site and concern for status epilepticus) presented to the ED anemia.    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    RECOMMENDATIONS  This is an incomplete consult note. All final recommendations to follow after interview and examination of the patient. Please follow recommendations noted below.    If any questions, please send a message or call on [employer] Teams  Please continue to update ID with any pertinent new laboratory or radiographic findings.    Jose Armando Justice M.D  Infectious Diseases Attending/   [employer] at [facility]   ASSESSMENT  This is a 79-year-old woman with PMHx of myelodysplastic syndrome (MDS) ,DM, HTN, CKD 2, and multiple recent admissions for a b/l subdural hematomas (SDH) with mass effect and midline shift (status-post evacuation and middle meningeal artery embolization complicated by a leaking craniotomy site and concern for status epilepticus) presented to the ED anemia.    IMPRESSION  #Anemia  #MDS, transfusion dependent- Revlimid and Vidaza, currently on Lusparacept, Last dose of Luspatercept documented was 10/1/34  #Pyruia- Hard to differentiate if asymptomatic vs real infection.   #Patient Non-verbal and functional quadriplegic +Peg tube.   #Chronic respiratory failure. Bilateral pleural effusions  #Was managed for pneumonia and had thoracentesis 12/11/2024. Cultures negative   #Sacral Ulcer noted. 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    RECOMMENDATIONS  -Follow up with blood cultures.  -Follow up with urine cultures.   -Check renal bladder US.   -Can switch IV ceftriaxone/Ampicillin to IV Zosyn 3.375 gram q 8 hours.  -Small care. TOV when able. Fecal incontinence catheter noted.   -Off loading to prevent pressure sores and preventive measures to avoid aspiration. Local wound care for the sacral ulcers.   -Guarded prognosis.     If any questions, please send a message or call on [employer] Teams  Please continue to update ID with any pertinent new laboratory or radiographic findings.    Jose Armando Justice M.D  Infectious Diseases Attending/   [employer] at [facility]

## 2025-01-01 NOTE — CHART NOTE - NSCHARTNOTEFT_GEN_A_CORE
Palliative Care chart note    Palliative care consult received electronically. Chart reviewed. Will plan to have team see patient for full consult on Thursday.    Please call [phone] PRN for any questions, needs, or concerns prior to that time.    Brady Sandhu MD  Palliative Medicine Fellow  [facility]  [phone]

## 2025-01-01 NOTE — CONSULT NOTE ADULT - SUBJECTIVE AND OBJECTIVE BOX
NEPHROLOGY CONSULTATION NOTE    Patient is a 79y Female with history of MDS, CKD stage 2 (follows with me outpatient, last seen July 2024), h/o SDH with mass effect (s/p evacuation and middle meningeal artery emolization), DM and seizure d/o, whom presented to the hospital with severe anemia for further evaluation.  Hb noted to be 5.6 on admission along with hyperkalemia to K 7.6, temporized with improvement noted since admission now currently 5.1.  She is nonverbal and bedbound currently and unable to provide further insight to her condition.  Nephrology was consulted for evaluation of hyperkalemia.    PAST MEDICAL & SURGICAL HISTORY:  DM (diabetes mellitus)  MDS (myelodysplastic syndrome)  SDH (subdural hematoma)  Seizure disorder  Chronic kidney disease (CKD)  H/O colonoscopy  S/P percutaneous endoscopic gastrostomy (PEG) tube placement  S/P craniotomy    Allergies:  No Known Allergies    Home Medications Reviewed  Hospital Medications:   MEDICATIONS  (STANDING):  albuterol    90 MICROgram(s) HFA Inhaler 2 Puff(s) Inhalation every 6 hours  amantadine Syrup 50 milliGRAM(s) Oral two times a day  amLODIPine   Tablet 5 milliGRAM(s) Oral daily  ampicillin  IVPB 2 Gram(s) IV Intermittent every 6 hours  cefTRIAXone   IVPB 1000 milliGRAM(s) IV Intermittent every 24 hours  chlorhexidine 2% Cloths 1 Application(s) Topical daily  furosemide    Tablet 40 milliGRAM(s) Oral daily  glucagon  Injectable 1 milliGRAM(s) IntraMuscular once  heparin   Injectable 5000 Unit(s) SubCutaneous every 12 hours  insulin glargine Injectable (LANTUS) 5 Unit(s) SubCutaneous at bedtime  insulin lispro (ADMELOG) corrective regimen sliding scale   SubCutaneous three times a day before meals  insulin lispro Injectable (ADMELOG) 2 Unit(s) SubCutaneous three times a day before meals  lacosamide Solution 200 milliGRAM(s) Oral two times a day  levETIRAcetam  Solution 1500 milliGRAM(s) Oral two times a day  pantoprazole   Suspension 40 milliGRAM(s) Oral daily  sodium zirconium cyclosilicate 10 Gram(s) Oral every 8 hours    SOCIAL HISTORY:  Denies ETOH,Smoking,   FAMILY HISTORY:      REVIEW OF SYSTEMS:  UTO - multiple medical problems    VITALS:  Vital Signs Last 24 Hrs  T(C): 37.3 (01 Jan 2025 12:00), Max: 38.1 (31 Dec 2024 21:26)  T(F): 99.1 (01 Jan 2025 12:00), Max: 100.5 (31 Dec 2024 21:26)  HR: 91 (01 Jan 2025 13:00) (82 - 98)  BP: 168/74 (01 Jan 2025 13:00) (116/51 - 170/72)  BP(mean): 106 (01 Jan 2025 13:00) (88 - 106)  RR: 20 (01 Jan 2025 13:00) (15 - 31)  SpO2: 100% (01 Jan 2025 13:00) (99% - 100%)    Parameters below as of 01 Jan 2025 13:00  Patient On (Oxygen Delivery Method): nasal cannula  O2 Flow (L/min): 2      12-31 @ 07:01  -  01-01 @ 07:00  --------------------------------------------------------  IN: 1071 mL / OUT: 615 mL / NET: 456 mL    01-01 @ 07:01  -  01-01 @ 14:20  --------------------------------------------------------  IN: 150 mL / OUT: 200 mL / NET: -50 mL      Height (cm): 154.9 (01-01 @ 00:00)  Weight (kg): 65.9 (01-01 @ 00:00)  BMI (kg/m2): 27.5 (01-01 @ 00:00)  BSA (m2): 1.65 (01-01 @ 00:00)    PHYSICAL EXAM:  Constitutional: NAD, somnolent unable to arouse  HEENT: anicteric sclera, oropharynx clear, MMM  Neck: No JVD  Respiratory: poor inspiratory effort, no wheezes, rales or rhonchi  Cardiovascular: S1, S2, RRR  Gastrointestinal: + PEG, hypoactive BS, soft, NT/ND  Extremities: No cyanosis or clubbing. +1 bl LE edema  Skin: No rashes, + sacral ulcer    LABS:  01-01    145  |  111[H]  |  60[H]  ----------------------------<  147[H]  5.1[H]   |  22  |  1.2    Ca    9.3      01 Jan 2025 13:40  Phos  4.9     01-01  Mg     2.0     01-01    TPro  5.5[L]  /  Alb  3.4[L]  /  TBili  0.9  /  DBili      /  AST  21  /  ALT  16  /  AlkPhos  104  01-01    Creatinine Trend: 1.2 <--, 1.1 <--, 1.2 <--, 1.2 <--, 1.2 <--, 1.2 <--, 1.1 <--, 1.1 <--, 0.9 <--, 1.0 <--, 1.0 <--, 1.0 <--, 1.0 <--, 0.9 <--, 0.8 <--, 1.0 <--                        9.7    10.87 )-----------( 299      ( 01 Jan 2025 04:24 )             30.1     Urine Studies:  Urinalysis Basic - ( 01 Jan 2025 13:40 )    Color:  / Appearance:  / SG:  / pH:   Gluc: 147 mg/dL / Ketone:   / Bili:  / Urobili:    Blood:  / Protein:  / Nitrite:    Leuk Esterase:  / RBC:  / WBC    Sq Epi:  / Non Sq Epi:  / Bacteria:                 RADIOLOGY & ADDITIONAL STUDIES:

## 2025-01-01 NOTE — CONSULT NOTE ADULT - CRITICAL CARE ATTENDING COMMENT
Attending comments:  -I have personally seen and examined this patient.  I have reviewed all pertinent clinical information and reviewed all relevant imaging and diagnostic studies personally.   -The patient's injury acutely impairs one or more vital organ systems, and there is a high probability of imminent or life threatening deterioration in the patient's condition. The care of this patient requires complex medical decision making in the field of Infectious Diseases and extensive interpretation of laboratory, microbiological and radiographic data.

## 2025-01-01 NOTE — CONSULT NOTE ADULT - ATTENDING COMMENTS
Ms. Kennedy is a patient of Dr. Wade with transfusion-dependent MDS on luspatercept. She presented to the hospital due to anemia of 5.6. Given 2U of PRBC in the ED. She is currently admitted for UTI.    Continue with supportive transfusion from hematology standpoint to keep hb > 7. Outpatient follow-up once the patient is treated for her UTI. Overall, she has a poor prognosis given her multiple comorbidities.

## 2025-01-01 NOTE — CONSULT NOTE ADULT - ASSESSMENT
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   #Chronic bilateral effusions   #CKD2 (Creat at baseline)  #Fever due to possible UTI  #Stage 3 sacral ulcer  #Diarrhea  #HTN  #DM  #Hx of SDH (sp evacuation and middle meningeal artery embolization complicated by a leaking craniotomy site and concern for status epilepticus)      PLAN:    CNS: Avoid CNS Depressants. c/w home antiepileptics.    HEENT: Oral care. Aspiration precautions     PULMONARY: CXR with chronic effusions.     CARDIOVASCULAR: Lasxi 40mg IV once. Check BNP. DC IVF. MAP is normal.     GI: GI prophylaxis. PEG feeding nepro. If persistent diarrhea then send Cdiff.     RENAL: Lokelma; insulin glucose this morning. Lasix 40mg once. Nephrology eval. Hold NS. BMP at 11 h am.    INFECTIOUS DISEASE: Empiric Rocephin + ampicillin given history of enterococcal UTI. ID eval. Procal. Sacral ulcer does not look infected.    HEMATOLOGICAL: Responded to the transfusion. Hg stable. DVT ppx. CBC in the afternoon.     ENDOCRINE: Monitor FS. Resume home insulin regimen.    MUSCULOSKELETAL: bedrest. Burn eval for sacral ulcer.    CODE STATUS: FULL    DISPOSITION: If K normalizes then can transfer to SDU.     Pall care eval. Poor prognosis.

## 2025-01-01 NOTE — CONSULT NOTE ADULT - ASSESSMENT
79y Female with history of MDS, CKD stage 2 (follows with me outpatient, last seen July 2024), h/o SDH with mass effect (s/p evacuation and middle meningeal artery emolization), DM and seizure d/o, whom presented to the hospital with severe anemia for further evaluation.  Hb noted to be 5.6 on admission along with hyperkalemia to K 7.6, temporized with improvement noted since admission now currently 5.1.  She is nonverbal and bedbound currently and unable to provide further insight to her condition.  Nephrology was consulted for evaluation of hyperkalemia.    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

## 2025-01-02 LAB
HCT VFR BLD CALC: 18.4 %
HGB BLD-MCNC: 5.9 G/DL
MCHC RBC-ENTMCNC: 29.4 PG
MCHC RBC-ENTMCNC: 32.1 G/DL
MCV RBC AUTO: 91.5 FL
PLATELET # BLD AUTO: 346 K/UL
PMV BLD: 10.7 FL
RBC # BLD: 2.01 M/UL
RBC # FLD: 16 %
WBC # FLD AUTO: 10.51 K/UL

## 2025-01-02 NOTE — CHART NOTE - NSCHARTNOTEFT_GEN_A_CORE
MICU Transfer Note  ---------------------------    Transfer from: MICU  Transfer to:  Floor    MICU COURSE    Case of 79-year-old woman with PMHx of myelodysplastic syndrome (MDS) followed by Dr. Wade (requiring periodic transfusions for anemia), DM, HTN, CKD 2, and multiple recent admissions for a b/l subdural hematomas (SDH) with mass effect and midline shift (status-post evacuation and middle meningeal artery embolization complicated by a leaking craniotomy site and concern for status epilepticus) presented to the ED for anemia on 12/31.    Patient was at [facility] was found to be anemic to 5.6 being sent to ED for transfusion. She is nonverbal, bedbound, and has a PEG tube. Unable to obtain a comprehensive history, review of systems, past medical history, and/or physical exam on arrival to ED due to constraints imposed by the patient's clinical condition and/or mental status.    Upon arrival to unit, patient noted to have diarrhea, non bloody. Also noted to be on loperamide PRN.   In the ED, she was grossly stable. Later developed a fever of 100.5  Labs: Hb 5.4, potassium 7.6  UA suggestive of infection    In the ED, given 1.5L NS + Insulin/d50/Calcium/lasix/albuterol. Admitted to MICU for further management.     - patient given two units pRBCs with inappropriate correction of Hgb from 5.6 to 9.8 Has remained hemodynamically stable with no signs of bleeding.   - Hyperkalemia managed by multiple pushes of insulin/dextrose/Lokelma/albuterol; also given calcium gluconate. 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 Avoid nephrotoxins:  NSAIDS, IV contrast, Aminoglycosides, fleet enemas  - Seen by infectious disease due to pyuria, though hard to differentiate if asymptomatic vs real infection -- transitioned to IV Zosyn 3.375 gram q 8 hours. 12/31 Urine Cx negative. Renal US without hydro.   - Family considering hospice; hospice is now following    Patient is medically stable for downgrade to medical floor.     To-Do:    [X] continue following ID recommendations   [X] monitor potassium  [X] follow family decision regarding hospice   [X] f/u flu/covid/rsv    OBJECTIVE --  Vital Signs Last 24 Hrs  T(C): 37.9 (02 Jan 2025 12:00), Max: 38.2 (02 Jan 2025 06:00)  T(F): 100.2 (02 Jan 2025 12:00), Max: 100.8 (02 Jan 2025 06:00)  HR: 110 (02 Jan 2025 16:00) (93 - 114)  BP: 176/77 (02 Jan 2025 16:00) (158/68 - 184/76)  BP(mean): 111 (02 Jan 2025 16:00) (98 - 112)  RR: 39 (02 Jan 2025 16:00) (14 - 39)  SpO2: 99% (02 Jan 2025 16:00) (99% - 100%)    Parameters below as of 02 Jan 2025 16:00  Patient On (Oxygen Delivery Method): nasal cannula  O2 Flow (L/min): 2      I&O's Summary    01 Jan 2025 07:01  -  02 Jan 2025 07:00  --------------------------------------------------------  IN: 1300 mL / OUT: 2205 mL / NET: -905 mL    02 Jan 2025 07:01  -  02 Jan 2025 16:56  --------------------------------------------------------  IN: 450 mL / OUT: 340 mL / NET: 110 mL        MEDICATIONS  (STANDING):  amantadine Syrup 50 milliGRAM(s) Oral two times a day  amLODIPine   Tablet 5 milliGRAM(s) Oral daily  chlorhexidine 2% Cloths 1 Application(s) Topical daily  furosemide    Tablet 40 milliGRAM(s) Oral daily  glucagon  Injectable 1 milliGRAM(s) IntraMuscular once  heparin   Injectable 5000 Unit(s) SubCutaneous every 12 hours  insulin glargine Injectable (LANTUS) 5 Unit(s) SubCutaneous at bedtime  insulin lispro (ADMELOG) corrective regimen sliding scale   SubCutaneous three times a day before meals  insulin lispro Injectable (ADMELOG) 2 Unit(s) SubCutaneous three times a day before meals  lacosamide Solution 200 milliGRAM(s) Oral two times a day  levETIRAcetam  Solution 1500 milliGRAM(s) Oral two times a day  pantoprazole   Suspension 40 milliGRAM(s) Oral daily  piperacillin/tazobactam IVPB.. 3.375 Gram(s) IV Intermittent every 8 hours    MEDICATIONS  (PRN):        LABS                                            9.3                   Neurophils% (auto):   67.7   (01-02 @ 05:19):    9.71 )-----------(359          Lymphocytes% (auto):  15.6                                          28.5                   Eosinphils% (auto):   4.7      Manual%: Neutrophils x    ; Lymphocytes x    ; Eosinophils x    ; Bands%: x    ; Blasts x                                    143    |  109    |  61                  Calcium: 9.0   / iCa: x      (01-02 @ 05:19)    ----------------------------<  249       Magnesium: 1.7                              4.6     |  23     |  1.1              Phosphorous: x                  ASSESSMENT & PLAN:     #Anemia likely due to MDS (transfusion dependent)  - s/p two units pRBCs on this admission  - Hgb inappropriately corrected to 9.8   - Has remained hemodynamically stable with no signs of bleeding  - monitor CBC qd + keep active type + screen    #Hyperkalemia -  resolved - etiology multifactorial: CKD, 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   - Avoid nephrotoxins:  NSAIDS, IV contrast, Aminoglycosides, fleet enemas  - continue to monitor BMP qd  - dc'd Lokelma      #Chronic bilateral effusions   - pro-BNP 9727, significantly increased from 1439 in Oct 2024  - c/w PO 40 daily Lasix    #Fever due to possible UTI  #Stage 3 sacral ulcer  - Seen by infectious disease due to pyuria, though hard to differentiate if asymptomatic vs real infection -- transitioned to IV Zosyn 3.375 gram q 8 hours.   - 12/31 Urine Cx negative. Renal US without hydro.   - wound care eval for sacral ulcer  - f/u flu/rsv/covid swab    #Diarrhea-- resolved    #DM  - c/w insulin    #HO SDH (sp evacuation and middle meningeal artery embolization complicated by a leaking craniotomy site and concern for status epilepticus)  - c/w home antiepileptics    MISC  #DVT prophylaxis: Lovenox  #GI prophylaxis: PPI  #Diet: NPO with tube feeds  #Activity: IAT  #Code status: Full Code-- but hospice now following  #Disposition: DG to floor

## 2025-01-02 NOTE — PROGRESS NOTE ADULT - SUBJECTIVE AND OBJECTIVE BOX
SIMONA KUHN  79y, Female  Allergy: No Known Allergies      LOS  2d    CHIEF COMPLAINT:     INTERVAL EVENTS/HPI  - T(F): , Max: 100.8 (01-02-25 @ 06:00)  - febrile overnight - non verbal   - WBC Count: 9.71 (01-02-25 @ 05:19)  WBC Count: 10.50 (01-01-25 @ 18:06)     - Creatinine: 1.1 (01-02-25 @ 05:19)  Creatinine: 1.1 (01-01-25 @ 18:06)       ROS  unable to obtain history secondary to patient's mental status and/or sedation      VITALS:  T(F): 100.8, Max: 100.8 (01-02-25 @ 06:00)  HR: 111  BP: 171/72  RR: 39Vital Signs Last 24 Hrs  T(C): 38.2 (02 Jan 2025 07:00), Max: 38.2 (02 Jan 2025 06:00)  T(F): 100.8 (02 Jan 2025 07:00), Max: 100.8 (02 Jan 2025 06:00)  HR: 111 (02 Jan 2025 10:00) (86 - 111)  BP: 171/72 (02 Jan 2025 10:00) (158/68 - 184/76)  BP(mean): 104 (02 Jan 2025 10:00) (98 - 112)  RR: 39 (02 Jan 2025 10:00) (14 - 39)  SpO2: 100% (02 Jan 2025 10:00) (99% - 100%)    Parameters below as of 02 Jan 2025 08:00  Patient On (Oxygen Delivery Method): nasal cannula  O2 Flow (L/min): 2      PHYSICAL EXAM:  Gen: NAD, resting in bed  HEENT: Normocephalic, atraumatic  Neck: supple, no lymphadenopathy  CV: Regular rate & regular rhythm  Lungs: decreased BS at bases, no fremitus  Abdomen: Soft, BS present  Ext: Warm, well perfused  Neuro: non focal, awake  Skin: no rash, no erythema  Lines: no phlebitis    FH: Non-contributory  Social Hx: Non-contributory    TESTS & MEASUREMENTS:                        9.3    9.71  )-----------( 359      ( 02 Jan 2025 05:19 )             28.5     01-02    143  |  109  |  61[HH]  ----------------------------<  249[H]  4.6   |  23  |  1.1    Ca    9.0      02 Jan 2025 05:19  Phos  4.9     01-01  Mg     1.7     01-02    TPro  5.5[L]  /  Alb  3.4[L]  /  TBili  0.9  /  DBili  x   /  AST  21  /  ALT  16  /  AlkPhos  104  01-01      LIVER FUNCTIONS - ( 01 Jan 2025 04:24 )  Alb: 3.4 g/dL / Pro: 5.5 g/dL / ALK PHOS: 104 U/L / ALT: 16 U/L / AST: 21 U/L / GGT: x           Urinalysis Basic - ( 02 Jan 2025 05:19 )    Color: x / Appearance: x / SG: x / pH: x  Gluc: 249 mg/dL / Ketone: x  / Bili: x / Urobili: x   Blood: x / Protein: x / Nitrite: x   Leuk Esterase: x / RBC: x / WBC x   Sq Epi: x / Non Sq Epi: x / Bacteria: x        Urinalysis with Rflx Culture (collected 12-31-24 @ 21:53)    Culture - Urine (collected 12-31-24 @ 21:53)  Source: Clean Catch None  Final Report (01-02-25 @ 10:16):    <10,000 CFU/mL Normal Urogenital Jacqueline    Culture - Fungal, Body Fluid (collected 12-11-24 @ 10:26)  Source: Pleural Fl  Preliminary Report (01-01-25 @ 23:01):    No fungus isolated at 3 weeks.    Culture - Body Fluid with Gram Stain (collected 12-11-24 @ 10:26)  Source: Pleural Fl  Gram Stain (12-11-24 @ 20:35):    polymorphonuclear leukocytes seen    No organisms seen    by cytocentrifuge  Final Report (12-16-24 @ 10:23):    No growth at 5 days    Culture - Urine (collected 12-10-24 @ 15:00)  Source: Catheterized None  Final Report (12-12-24 @ 13:43):    10,000 - 49,000 CFU/mL Candida albicans    "Susceptibilities not performed"    Urinalysis with Rflx Culture (collected 12-10-24 @ 15:00)    Culture - Blood (collected 12-10-24 @ 14:01)  Source: .Blood BLOOD  Final Report (12-15-24 @ 23:01):    No growth at 5 days    Culture - Blood (collected 12-10-24 @ 14:01)  Source: .Blood BLOOD  Final Report (12-15-24 @ 23:01):    No growth at 5 days        Lactate, Blood: 1.5 mmol/L (01-01-25 @ 13:06)  Lactate, Blood: 2.2 mmol/L (01-01-25 @ 04:24)  Lactate, Blood: 2.5 mmol/L (12-31-24 @ 22:54)  Blood Gas Venous - Lactate: 2.7 mmol/L (12-31-24 @ 21:30)      INFECTIOUS DISEASES TESTING  Procalcitonin: 0.65 (01-01-25 @ 07:54)  MRSA PCR Result.: Negative (01-01-25 @ 06:17)  MRSA PCR Result.: Negative (12-12-24 @ 11:46)  MRSA PCR Result.: Negative (11-02-24 @ 07:00)  Procalcitonin: 0.48 (10-16-24 @ 13:31)  Procalcitonin: 1.04 (10-13-24 @ 12:08)  MRSA PCR Result.: Positive (10-13-24 @ 12:08)  MRSA PCR Result.: NotDetec (10-09-24 @ 05:50)  Procalcitonin: 7.30 (10-09-24 @ 02:40)      INFLAMMATORY MARKERS      RADIOLOGY & ADDITIONAL TESTS:  I have personally reviewed the last available Chest xray  CXR  Xray Chest 1 View AP/PA:   ACC: [id number] EXAM:  XR CHEST 1 VIEW   ORDERED BY: ALEJANDRA BROWN     PROCEDURE DATE:  12/31/2024          INTERPRETATION:  Clinical History / Reason for exam: Shortness of breath    Comparison : Chest radiograph 12/11/2024.    Technique/Positioning:Single frontal chest x-ray obtained.    Findings:    Support devices: Unchanged right chest port.    Cardiac/mediastinum/hilum: Unchanged.    Lung parenchyma/Pleura: Increased bilateral pleural effusions, bilateral   opacities..    Skeleton/soft tissues: Unchanged.    Impression:    Increased bilateral pleural effusions, bilateral opacities.    --- End of Report ---            SILVER FOREMAN MD; Attending Radiologist  This document has been electronically signed. Jan 1 2025  2:49PM (12-31-24 @ 21:01)      CT      CARDIOLOGY TESTING  12 Lead ECG:   Ventricular Rate 88 BPM    Atrial Rate 88 BPM    P-R Interval 166 ms    QRS Duration 86 ms    Q-T Interval 362 ms    QTC Calculation(Bazett) 438 ms    P Axis 37 degrees    R Axis 17 degrees    T Axis 44 degrees    Diagnosis Line Normal sinus rhythm  Low voltage QRS  Cannot rule out Anterior infarct , age undetermined  Abnormal ECG    Confirmed by Alexis Ho ([id number]) on 1/2/2025 7:38:28 AM (01-01-25 @ 05:28)  12 Lead ECG:   Ventricular Rate 84 BPM    Atrial Rate 84 BPM    P-R Interval 168 ms    QRS Duration 96 ms    Q-T Interval 384 ms    QTC Calculation(Bazett) 453 ms    P Axis 48 degrees    R Axis 20 degrees    T Axis 35 degrees    Diagnosis Line Normal sinus rhythm  Low voltage QRS  Borderline ECG    Confirmed by Kaz Murphy ([id number]) on 1/1/2025 4:07:54 PM (01-01-25 @ 01:13)      MEDICATIONS  amantadine Syrup 50 Oral two times a day  amLODIPine   Tablet 5 Oral daily  chlorhexidine 2% Cloths 1 Topical daily  furosemide    Tablet 40 Oral daily  glucagon  Injectable 1 IntraMuscular once  heparin   Injectable 5000 SubCutaneous every 12 hours  insulin glargine Injectable (LANTUS) 5 SubCutaneous at bedtime  insulin lispro (ADMELOG) corrective regimen sliding scale  SubCutaneous three times a day before meals  insulin lispro Injectable (ADMELOG) 2 SubCutaneous three times a day before meals  lacosamide Solution 200 Oral two times a day  levETIRAcetam  Solution 1500 Oral two times a day  pantoprazole   Suspension 40 Oral daily  piperacillin/tazobactam IVPB.. 3.375 IV Intermittent every 8 hours  sodium zirconium cyclosilicate 10 Oral every 8 hours      WEIGHT  Weight (kg): 65.9 (01-01-25 @ 00:00)  Creatinine: 1.1 mg/dL (01-02-25 @ 05:19)  Creatinine: 1.1 mg/dL (01-01-25 @ 18:06)  Creatinine: 1.2 mg/dL (01-01-25 @ 13:40)  Creatinine: 1.1 mg/dL (01-01-25 @ 13:06)      ANTIBIOTICS:  piperacillin/tazobactam IVPB.. 3.375 Gram(s) IV Intermittent every 8 hours      All available historical records have been reviewed

## 2025-01-02 NOTE — DIETITIAN INITIAL EVALUATION ADULT - ORAL INTAKE PTA/DIET HISTORY
Patient noted to be nonverbal. No family present at bedside. RD to obtain nutrition hx at follow up as able.

## 2025-01-02 NOTE — PROGRESS NOTE ADULT - SUBJECTIVE AND OBJECTIVE BOX
seen and examined  24 h events noted   no distress         PAST HISTORY  --------------------------------------------------------------------------------  No significant changes to PMH, PSH, FHx, SHx, unless otherwise noted    ALLERGIES & MEDICATIONS  --------------------------------------------------------------------------------  Allergies    No Known Allergies    Intolerances      Standing Inpatient Medications  albuterol    90 MICROgram(s) HFA Inhaler 2 Puff(s) Inhalation every 6 hours  amantadine Syrup 50 milliGRAM(s) Oral two times a day  amLODIPine   Tablet 5 milliGRAM(s) Oral daily  chlorhexidine 2% Cloths 1 Application(s) Topical daily  furosemide    Tablet 40 milliGRAM(s) Oral daily  glucagon  Injectable 1 milliGRAM(s) IntraMuscular once  heparin   Injectable 5000 Unit(s) SubCutaneous every 12 hours  insulin glargine Injectable (LANTUS) 5 Unit(s) SubCutaneous at bedtime  insulin lispro (ADMELOG) corrective regimen sliding scale   SubCutaneous three times a day before meals  insulin lispro Injectable (ADMELOG) 2 Unit(s) SubCutaneous three times a day before meals  lacosamide Solution 200 milliGRAM(s) Oral two times a day  levETIRAcetam  Solution 1500 milliGRAM(s) Oral two times a day  pantoprazole   Suspension 40 milliGRAM(s) Oral daily  piperacillin/tazobactam IVPB.. 3.375 Gram(s) IV Intermittent every 8 hours  sodium zirconium cyclosilicate 10 Gram(s) Oral every 8 hours        VITALS/PHYSICAL EXAM  --------------------------------------------------------------------------------  T(C): 38.2 (01-02-25 @ 07:00), Max: 38.2 (01-02-25 @ 06:00)  HR: 105 (01-02-25 @ 07:00) (86 - 107)  BP: 178/78 (01-02-25 @ 07:00) (144/61 - 184/76)  RR: 17 (01-02-25 @ 07:00) (14 - 20)  SpO2: 99% (01-02-25 @ 07:00) (99% - 100%)  Wt(kg): --  Height (cm): 154.9 (01-01-25 @ 00:00)  Weight (kg): 65.9 (01-01-25 @ 00:00)  BMI (kg/m2): 27.5 (01-01-25 @ 00:00)  BSA (m2): 1.65 (01-01-25 @ 00:00)      01-01-25 @ 07:01  -  01-02-25 @ 07:00  --------------------------------------------------------  IN: 1300 mL / OUT: 2205 mL / NET: -905 mL      Physical Exam:  	Gen: NAD  	Pulm:  B/L ghzaal at bases   	CV:  S1S2; no rub  	Abd: +distended  	LE:  edema  	    LABS/STUDIES  --------------------------------------------------------------------------------              9.3    9.71  >-----------<  359      [01-02-25 @ 05:19]              28.5     143  |  109  |  61  ----------------------------<  249      [01-02-25 @ 05:19]  4.6   |  23  |  1.1        Ca     9.0     [01-02-25 @ 05:19]      Mg     1.7     [01-02-25 @ 05:19]      Phos  4.9     [01-01-25 @ 04:24]    TPro  5.5  /  Alb  3.4  /  TBili  0.9  /  DBili  x   /  AST  21  /  ALT  16  /  AlkPhos  104  [01-01-25 @ 04:24]              [01-01-25 @ 04:24]    Creatinine Trend:  SCr 1.1 [01-02 @ 05:19]  SCr 1.1 [01-01 @ 18:06]  SCr 1.2 [01-01 @ 13:40]  SCr 1.1 [01-01 @ 13:06]  SCr 1.2 [01-01 @ 04:24]    Urinalysis - [01-02-25 @ 05:19]      Color  / Appearance  / SG  / pH       Gluc 249 / Ketone   / Bili  / Urobili        Blood  / Protein  / Leuk Est  / Nitrite       RBC  / WBC  / Hyaline  / Gran  / Sq Epi  / Non Sq Epi  / Bacteria       Iron 113, TIBC <130, %sat --      [10-24-24 @ 11:14]  Ferritin 4160      [10-24-24 @ 11:14]  HbA1c 7.3      [01-10-20 @ 05:38]  TSH 4.15      [11-04-24 @ 05:16]  Lipid: chol 90, TG 86, HDL 46, LDL --      [11-04-24 @ 05:16]

## 2025-01-02 NOTE — PROGRESS NOTE ADULT - ASSESSMENT
79y Female with history of MDS, CKD stage 2 (follows with me outpatient, last seen July 2024), h/o SDH with mass effect (s/p evacuation and middle meningeal artery emolization), DM and seizure d/o, whom presented to the hospital with severe anemia for further evaluation.  Hb noted to be 5.6 on admission along with hyperkalemia to K 7.6, temporized with improvement noted since admission now currently 5.1.  She is nonverbal and bedbound currently and unable to provide further insight to her condition.  Nephrology was consulted for evaluation of hyperkalemia.  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

## 2025-01-02 NOTE — CONSULT NOTE ADULT - ASSESSMENT
79F with PMH of MDS, DM, HTN, CKD2, stage 3 sacral ulcer, recent SDHs (s/p MME and evacuation) here with anemia at [facility]. Patient is nonverbal, bedbound, and with a PEG tube. s/p pRBC transfusion. Also on PO lasix, and on IV zosyn for possible infection. Seen by palliative care in the past, and daughter wanted full medical measures. Palliative reconsulted for GOC.   79F with PMH of MDS, DM, HTN, CKD2, stage 3 sacral ulcer, recent SDHs (s/p MME and evacuation) here with anemia at cancer center. Patient is nonverbal, bedbound, and with a PEG tube. s/p pRBC transfusion. Also on PO lasix, and on IV zosyn for possible infection. Seen by palliative care in the past, and daughter wanted full medical measures. Palliative consulted for GOC.     79F with PMH of MDS, DM, HTN, CKD2, stage 3 sacral ulcer, recent SDHs (s/p MME and evacuation) here with anemia at cancer center. Patient is nonverbal, bedbound, and with a PEG tube. s/p pRBC transfusion. Also on PO lasix, and on IV zosyn for possible infection. Seen by palliative care in the past, and daughter wanted full medical measures. Palliative consulted for GOC.    - c/w care in CCU, higher risk in setting of comorbidities, septic on IV abx  - hospice following, daughter considering hospice, but needs to d/w family  - GOC pending, will d/w with daughter re GOC including code status  - will follow    ______________  Cy Morales MD  Palliative Medicine  [facility]   of Geriatric and Palliative Medicine  [phone]

## 2025-01-02 NOTE — PROGRESS NOTE ADULT - SUBJECTIVE AND OBJECTIVE BOX
CHIEF COMPLAINT:  Patient is a 79y old  Female who presents with a chief complaint of     INTERVAL HISTORY/OVERNIGHT EVENTS:      ======================  MEDICATIONS:  amantadine Syrup 50 milliGRAM(s) Oral two times a day  amLODIPine   Tablet 5 milliGRAM(s) Oral daily  chlorhexidine 2% Cloths 1 Application(s) Topical daily  furosemide    Tablet 40 milliGRAM(s) Oral daily  glucagon  Injectable 1 milliGRAM(s) IntraMuscular once  heparin   Injectable 5000 Unit(s) SubCutaneous every 12 hours  insulin glargine Injectable (LANTUS) 5 Unit(s) SubCutaneous at bedtime  insulin lispro (ADMELOG) corrective regimen sliding scale   SubCutaneous three times a day before meals  insulin lispro Injectable (ADMELOG) 2 Unit(s) SubCutaneous three times a day before meals  lacosamide Solution 200 milliGRAM(s) Oral two times a day  levETIRAcetam  Solution 1500 milliGRAM(s) Oral two times a day  pantoprazole   Suspension 40 milliGRAM(s) Oral daily  piperacillin/tazobactam IVPB.. 3.375 Gram(s) IV Intermittent every 8 hours  sodium zirconium cyclosilicate 10 Gram(s) Oral every 8 hours    DRIPS:    PRN:       ======================  PHYSICAL EXAMINATION:    ======================  OBJECTIVE:        VS:  T(F): 100.8 (01-02 @ 07:00), Max: 100.8 (01-02 @ 06:00)  HR: 111 (01-02 @ 11:00) (86 - 111)  BP: 168/72 (01-02 @ 11:00) (158/68 - 184/76)  RR: 32 (01-02 @ 11:00) (14 - 39)  SpO2: 100% (01-02 @ 11:00) (99% - 100%)  CVP(mm Hg): --  CO: --  CI: --  PA: --  PCWP: --    I/O:      12-31 @ 07:01  -  01-01 @ 07:00  --------------------------------------------------------  IN: 1071 mL / OUT: 615 mL / NET: 456 mL    01-01 @ 07:01 - 01-02 @ 07:00  --------------------------------------------------------  IN: 1300 mL / OUT: 2205 mL / NET: -905 mL    01-02 @ 07:01  - 01-02 @ 11:14  --------------------------------------------------------  IN: 0 mL / OUT: 200 mL / NET: -200 mL        Weight trend:  Weight (kg): 65.9 (01-01)    ======================    LABS:  ABG - ( 01 Jan 2025 00:39 )  pH, Arterial: 7.37  pH, Blood: x     /  pCO2: 44    /  pO2: 133   / HCO3: 25    / Base Excess: -0.2  /  SaO2: 99.8                                    9.3    9.71  )-----------( 359      ( 02 Jan 2025 05:19 )             28.5     01-02    143  |  109  |  61[HH]  ----------------------------<  249[H]  4.6   |  23  |  1.1    Ca    9.0      02 Jan 2025 05:19  Phos  4.9     01-01  Mg     1.7     01-02    TPro  5.5[L]  /  Alb  3.4[L]  /  TBili  0.9  /  DBili  x   /  AST  21  /  ALT  16  /  AlkPhos  104  01-01    LIVER FUNCTIONS - ( 01 Jan 2025 04:24 )  Alb: 3.4 g/dL / Pro: 5.5 g/dL / ALK PHOS: 104 U/L / ALT: 16 U/L / AST: 21 U/L / GGT: x                     Urinalysis Basic - ( 02 Jan 2025 05:19 )    Color: x / Appearance: x / SG: x / pH: x  Gluc: 249 mg/dL / Ketone: x  / Bili: x / Urobili: x   Blood: x / Protein: x / Nitrite: x   Leuk Esterase: x / RBC: x / WBC x   Sq Epi: x / Non Sq Epi: x / Bacteria: x        Cultures:    Culture - Urine (collected 12-31)  Source: Clean Catch None  Final Report:    <10,000 CFU/mL Normal Urogenital Jacqueline           CHIEF COMPLAINT:  Patient is a 79y old  Female who presents with a chief complaint of     INTERVAL HISTORY/OVERNIGHT EVENTS:    Over Night Events: Febrile overnight and this morning.  NO gtts.  On 2l NC    ======================  MEDICATIONS:  amantadine Syrup 50 milliGRAM(s) Oral two times a day  amLODIPine   Tablet 5 milliGRAM(s) Oral daily  chlorhexidine 2% Cloths 1 Application(s) Topical daily  furosemide    Tablet 40 milliGRAM(s) Oral daily  glucagon  Injectable 1 milliGRAM(s) IntraMuscular once  heparin   Injectable 5000 Unit(s) SubCutaneous every 12 hours  insulin glargine Injectable (LANTUS) 5 Unit(s) SubCutaneous at bedtime  insulin lispro (ADMELOG) corrective regimen sliding scale   SubCutaneous three times a day before meals  insulin lispro Injectable (ADMELOG) 2 Unit(s) SubCutaneous three times a day before meals  lacosamide Solution 200 milliGRAM(s) Oral two times a day  levETIRAcetam  Solution 1500 milliGRAM(s) Oral two times a day  pantoprazole   Suspension 40 milliGRAM(s) Oral daily  piperacillin/tazobactam IVPB.. 3.375 Gram(s) IV Intermittent every 8 hours  sodium zirconium cyclosilicate 10 Gram(s) Oral every 8 hours        ======================  PHYSICAL EXAMINATION:    CONSTITUTIONAL: Ill appearing   SKIN: Skin exam is warm and dry, no acute rash. Stage 3 sacral ulcer w/o signs of infection.  EYES: PERRL  ENT: airway clear  CARD: RRR.  RESP: Normal respiratory effort, no tachypnea or distress. Lungs CTAB, no wheezes, rales or rhonchi.  ABD: soft, NT/ND.  EXT: No edema.  NEURO: Nonverbal at baseline. No focal deficits.    ======================  OBJECTIVE:        VS:  T(F): 100.8 (01-02 @ 07:00), Max: 100.8 (01-02 @ 06:00)  HR: 111 (01-02 @ 11:00) (86 - 111)  BP: 168/72 (01-02 @ 11:00) (158/68 - 184/76)  RR: 32 (01-02 @ 11:00) (14 - 39)  SpO2: 100% (01-02 @ 11:00) (99% - 100%)  CVP(mm Hg): --  CO: --  CI: --  PA: --  PCWP: --    I/O:      12-31 @ 07:01  -  01-01 @ 07:00  --------------------------------------------------------  IN: 1071 mL / OUT: 615 mL / NET: 456 mL    01-01 @ 07:01  -  01-02 @ 07:00  --------------------------------------------------------  IN: 1300 mL / OUT: 2205 mL / NET: -905 mL    01-02 @ 07:01  -  01-02 @ 11:14  --------------------------------------------------------  IN: 0 mL / OUT: 200 mL / NET: -200 mL        Weight trend:  Weight (kg): 65.9 (01-01)    ======================    LABS:  ABG - ( 01 Jan 2025 00:39 )  pH, Arterial: 7.37  pH, Blood: x     /  pCO2: 44    /  pO2: 133   / HCO3: 25    / Base Excess: -0.2  /  SaO2: 99.8                                    9.3    9.71  )-----------( 359      ( 02 Jan 2025 05:19 )             28.5     01-02    143  |  109  |  61[HH]  ----------------------------<  249[H]  4.6   |  23  |  1.1    Ca    9.0      02 Jan 2025 05:19  Phos  4.9     01-01  Mg     1.7     01-02    TPro  5.5[L]  /  Alb  3.4[L]  /  TBili  0.9  /  DBili  x   /  AST  21  /  ALT  16  /  AlkPhos  104  01-01    LIVER FUNCTIONS - ( 01 Jan 2025 04:24 )  Alb: 3.4 g/dL / Pro: 5.5 g/dL / ALK PHOS: 104 U/L / ALT: 16 U/L / AST: 21 U/L / GGT: x                     Urinalysis Basic - ( 02 Jan 2025 05:19 )    Color: x / Appearance: x / SG: x / pH: x  Gluc: 249 mg/dL / Ketone: x  / Bili: x / Urobili: x   Blood: x / Protein: x / Nitrite: x   Leuk Esterase: x / RBC: x / WBC x   Sq Epi: x / Non Sq Epi: x / Bacteria: x        Cultures:    Culture - Urine (collected 12-31)  Source: Clean Catch None  Final Report:    <10,000 CFU/mL Normal Urogenital Jacqueline

## 2025-01-02 NOTE — DIETITIAN INITIAL EVALUATION ADULT - COLLABORATION WITH OTHER PROVIDERS
Interventions: EN recs, coordination of care  Monitoring/Evaluation: EN tolerance, BM, GI s/s, weight, labs, skin status, NFPF

## 2025-01-02 NOTE — CONSULT NOTE ADULT - ATTENDING COMMENTS
79F with PMH of MDS, DM, HTN, CKD2, stage 3 sacral ulcer, recent SDHs (s/p MME and evacuation) here with anemia at cancer center. Patient is nonverbal, bedbound, and with a PEG tube. s/p pRBC transfusion. Also on PO lasix, and on IV zosyn for possible infection. Seen by palliative care in the past, and daughter wanted full medical measures. Palliative consulted for GOC.    - c/w care in CCU, higher risk in setting of comorbidities, septic on IV abx  - hospice following, daughter considering hospice, but needs to d/w family  - GOC pending  - will follow    ______________  Cy Morales MD  Palliative Medicine  [facility]   of Geriatric and Palliative Medicine  [phone]

## 2025-01-02 NOTE — DIETITIAN INITIAL EVALUATION ADULT - OTHER INFO
Patient is a 79-year-old woman with PMHx of myelodysplastic syndrome (MDS) followed by Dr. Wade (requiring periodic transfusions for anemia), DM, HTN, CKD 2, and multiple recent admissions for a b/l subdural hematomas (SDH) with mass effect and midline shift (status-post evacuation and middle meningeal artery embolization complicated by a leaking craniotomy site and concern for status epilepticus) presented to the ED anemia.    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   Chronic bilateral effusions   CKD2 (Creat at baseline)  Fever due to possible UTI  Stage 3 sacral ulcer  Diarrhea  HTN  DM  HO SDH (sp evacuation and middle meningeal artery embolization complicated by a leaking craniotomy site and concern for status epilepticus)    PEG feeding Nepro. No longer having diarrhea.     Weight hx per EMR:  85.3 kg (1/4/24)  63 kg (2/1/24)  61.2 kg (5/23/24)  64.3 kg (10/9/24)  66.4 kg (11/12/24)  31.7 kg (12/12/24)   vs 65.9 kg (1/1/25)  85.3 kg (1/4/24) vs 65.9 kg (1/1/25)  29.4% weight loss x 1 year

## 2025-01-02 NOTE — DIETITIAN INITIAL EVALUATION ADULT - ADD RECOMMEND
1) EN recs as above  2) Monitor electrolytes, BG, renal profile  -adjust insulin regimen PRN  3) Monitor BM, GI s/s - add bowel regimen PRN - as no long with diarrhea per MD note

## 2025-01-02 NOTE — CONSULT NOTE ADULT - SUBJECTIVE AND OBJECTIVE BOX
HPI: 79F with PMH of MDS, DM, HTN, CKD2, stage 3 sacral ulcer, recent SDHs (s/p MME and evacuation) here with anemia at cancer center. Patient is nonverbal, bedbound, and with a PEG tube. s/p pRBC transfusion. Also on PO lasix, and on IV zosyn for possible infection. Seen by palliative care in the past, and daughter wanted full medical measures. Palliative reconsulted for GOC.    PERTINENT PM/SXH:   DM (diabetes mellitus)    MDS (myelodysplastic syndrome)    SDH (subdural hematoma)    Seizure disorder    Chronic kidney disease (CKD)      No significant past surgical history    H/O colonoscopy    S/P percutaneous endoscopic gastrostomy (PEG) tube placement    S/P craniotomy      FAMILY HISTORY:  no pertinent family history      SOCIAL HISTORY:   Significant other/partner[ ]  Children[ ]  Taoist/Spirituality:  Substance hx:  [ ]   Tobacco hx:  [ ]   Alcohol hx: [ ]   Living Situation: [ ]Home  [ ]Long term care  [ ]Rehab [ ]Other  Home Services: [ ] HHA [ ] Visting RN [ ] Hospice  Occupation:  Home Opioid hx:  [ ] Y [ ] N [ ] I-Stop Reference No:    ADVANCE DIRECTIVES:    [ ] Full Code [ ] DNR  MOLST  [ ]  Living Will  [ ]   DECISION MAKER(s):  [ ] Health Care Proxy(s)  [ ] Surrogate(s)  [ ] Guardian           Name(s): Phone Number(s): tree Garcia [phone]    BASELINE (I)ADL(s) (prior to admission):  [place]: [ ]Total  [ ] Moderate [ ]Dependent  Palliative Performance Status Version 2:         %    http://npcrc.org/files/news/palliative_performance_scale_ppsv2.pdf    Allergies    No Known Allergies    Intolerances    MEDICATIONS  (STANDING):  amantadine Syrup 50 milliGRAM(s) Oral two times a day  amLODIPine   Tablet 5 milliGRAM(s) Oral daily  chlorhexidine 2% Cloths 1 Application(s) Topical daily  furosemide    Tablet 40 milliGRAM(s) Oral daily  glucagon  Injectable 1 milliGRAM(s) IntraMuscular once  heparin   Injectable 5000 Unit(s) SubCutaneous every 12 hours  insulin glargine Injectable (LANTUS) 5 Unit(s) SubCutaneous at bedtime  insulin lispro (ADMELOG) corrective regimen sliding scale   SubCutaneous three times a day before meals  insulin lispro Injectable (ADMELOG) 2 Unit(s) SubCutaneous three times a day before meals  lacosamide Solution 200 milliGRAM(s) Oral two times a day  levETIRAcetam  Solution 1500 milliGRAM(s) Oral two times a day  pantoprazole   Suspension 40 milliGRAM(s) Oral daily  piperacillin/tazobactam IVPB.. 3.375 Gram(s) IV Intermittent every 8 hours  sodium zirconium cyclosilicate 10 Gram(s) Oral every 8 hours    MEDICATIONS  (PRN):      PRESENT SYMPTOMS: [ ]Unable to obtain due to poor mentation   Source if other than patient:  [ ]Family   [ ]Team   [ X]All review of systems negative including pain and dyspnea unless noted below    All components of pain assessment below addressed. Blank spaces indicate that the patient did/could not complete the assessment.  Pain: [ ]yes [ ]no  QOL impact -   Location -                    Aggravating factors -  Quality -  Radiation -  Timing-  Severity (0-10 scale):  Minimal acceptable level (0-10 scale):     CPOT:    https://www.[facility].org/getattachment/[email or web address]/Critical-Care-Pain-Observation-Tool-(CPOT)    PAIN AD Score:   http://geriatrictoolkit.[place].[facility]/cog/painad.pdf (press ctrl +  left click to view)    Dyspnea:                           [ ]None[ ]Mild [ ]Moderate [ ]Severe     Respiratory Distress Observation Scale (RDOS):   A score of 0 to 2 signifies little or no respiratory distress, 3 signifies mild distress, scores 4 to 6 indicate moderate distress, and scores greater than 7 signify severe distress  https://www.[facility].ca/sites/default/files/PDFS/455531-qievtcaebap-fpmaskyn-vgnhybmmvpv-qvimy.pdf    Anxiety:                             [ ]None[ ]Mild [ ]Moderate [ ]Severe   Fatigue:                             [ ]None[ ]Mild [ ]Moderate [ ]Severe   Nausea:                             [ ]None[ ]Mild [ ]Moderate [ ]Severe   Loss of appetite:              [ ]None[ ]Mild [ ]Moderate [ ]Severe   Constipation:                    [ ]None[ ]Mild [ ]Moderate [ ]Severe    Other Symptoms:      Palliative Performance Status Version 2:         %    http://npcrc.org/files/news/palliative_performance_scale_ppsv2.pdf  PHYSICAL EXAM:  Vital Signs Last 24 Hrs  T(C): 38.2 (02 Jan 2025 07:00), Max: 38.2 (02 Jan 2025 06:00)  T(F): 100.8 (02 Jan 2025 07:00), Max: 100.8 (02 Jan 2025 06:00)  HR: 109 (02 Jan 2025 09:00) (86 - 109)  BP: 169/71 (02 Jan 2025 09:00) (158/68 - 184/76)  BP(mean): 102 (02 Jan 2025 09:00) (98 - 112)  RR: 36 (02 Jan 2025 09:00) (14 - 37)  SpO2: 100% (02 Jan 2025 09:00) (99% - 100%)    Parameters below as of 02 Jan 2025 08:00  Patient On (Oxygen Delivery Method): nasal cannula  O2 Flow (L/min): 2   I&O's Summary    01 Jan 2025 07:01  -  02 Jan 2025 07:00  --------------------------------------------------------  IN: 1300 mL / OUT: 2205 mL / NET: -905 mL    02 Jan 2025 07:01  -  02 Jan 2025 10:08  --------------------------------------------------------  IN: 0 mL / OUT: 135 mL / NET: -135 mL        GENERAL:  [X ] No acute distress [ ]Lethargic  [ ]Unarousable  [ ]Verbal  [ ]Non-Verbal [ ]Cachexia    BEHAVIORAL/PSYCH:  [ ]Alert and Oriented x  [ ] Anxiety [ ] Delirium [ ] Agitation [X ] Calm   EYES: [ ] No scleral icterus [ ] Scleral icterus [ ] Closed  ENMT:  [ ]Dry mouth  [ ]No external oral lesions [ X] No external ear or nose lesions  CARDIOVASCULAR:  [ ]Regular [ ]Irregular [ ]Tachy [ ]Not Tachy  [ ][place] [ ] Edema [ ] No edema  PULMONARY:  [ ]Tachypnea  [ ]Audible excessive secretions [X ] No labored breathing [ ] labored breathing  GASTROINTESTINAL: [ ]Soft  [ ]Distended  [ X]Not distended [ ]Non tender [ ]Tender  MUSCULOSKELETAL: [ ]No clubbing [ ] clubbing  [ X] No cyanosis [ ] cyanosis  NEUROLOGIC: [ ]No focal deficits  [ ]Follows commands  [ ]Does not follow commands  [ ]Cognitive impairment  [ ]Dysphagia  [ ]Dysarthria  [ ]Paresis   SKIN: [ ] Jaundiced [X ] Non-jaundiced [ ]Rash [ ]No Rash [ ] Warm [ ] Dry  MISC/LINES: [ ] ET tube [ ] Trach [ ]NGT/OGT [ ]PEG [ ]Small    CRITICAL CARE:  [ ] Shock Present  [ ]Septic [ ]Cardiogenic [ ]Neurologic [ ]Hypovolemic  [ ]  Vasopressors [ ]  Inotropes   [ ]Respiratory failure present [ ]Mechanical ventilation [ ]Non-invasive ventilatory support [ ]High flow  [ ]Acute  [ ]Chronic [ ]Hypoxic  [ ]Hypercarbic [ ]Other  [ ]Other organ failure     LABS: reviewed by me, notable for:                         9.3    9.71  )-----------( 359      ( 02 Jan 2025 05:19 )             28.5   01-02    143  |  109  |  61[HH]  ----------------------------<  249[H]  4.6   |  23  |  1.1    Ca    9.0      02 Jan 2025 05:19  Phos  4.9     01-01  Mg     1.7     01-02    TPro  5.5[L]  /  Alb  3.4[L]  /  TBili  0.9  /  DBili  x   /  AST  21  /  ALT  16  /  AlkPhos  104  01-01      Urinalysis Basic - ( 02 Jan 2025 05:19 )    Color: x / Appearance: x / SG: x / pH: x  Gluc: 249 mg/dL / Ketone: x  / Bili: x / Urobili: x   Blood: x / Protein: x / Nitrite: x   Leuk Esterase: x / RBC: x / WBC x   Sq Epi: x / Non Sq Epi: x / Bacteria: x      RADIOLOGY & ADDITIONAL STUDIES: CXR personally reviewed by me:    PROTEIN CALORIE MALNUTRITION PRESENT: [ ]mild [ ]moderate [ ]severe [ ]underweight [ ]morbid obesity  https://www.andeal.org/vault/2440/web/files/ONC/Table_Clinical%20Characteristics%20to%20Document%20Malnutrition-White%20JV%20et%20al%202012.pdf    Height (cm): 154.9 (01-01-25 @ 00:00), 162.6 (12-10-24 @ 11:19), 162.6 (11-12-24 @ 08:42)  Weight (kg): 65.9 (01-01-25 @ 00:00), 31.706 (12-12-24 @ 04:22), 66.4 (11-12-24 @ 08:42)  BMI (kg/m2): 27.5 (01-01-25 @ 00:00), 12 (12-12-24 @ 04:22), 25.1 (12-10-24 @ 11:19)    [ ]PPSV2 < or = to 30% [ ]significant weight loss  [ ]poor nutritional intake  [ ]anasarca      [ ]Artificial Nutrition          Palliative Care Spiritual/Emotional Screening Tool Question  Severity (0-4):                    OR                    [X ] Unable to determine/NA  Score of 2 or greater indicates recommendation of Chaplaincy referral  Chaplaincy Referral: [ ] Yes [ ] Refused [ ] Following     Caregiver [place]:  [ ] Yes [ ] No    OR    [x ] Unable to determine. Will assess at later time if appropriate.  Social Work Referral [ ]  Patient and Family Centered Care Referral [ ]    Anticipatory Grief Present: [ ] Yes [ ] No    OR     [ x] Unable to determine. Will assess at later time if appropriate.  Social Work Referral [ ]  Patient and Family Centered Care Referral [ ]    REFERRALS:   [ ]Chaplaincy  [ ]Hospice  [ ]Child Life  [ ]Social Work  [ ]Case management [ ]Holistic Therapy     Palliative care education provided to patient and/or family    Goals of Care Document:     ______________  Cy Morales MD  Palliative Medicine  [facility]   of Geriatric and Palliative Medicine  [phone]   HPI: 79F with PMH of MDS, DM, HTN, CKD2, stage 3 sacral ulcer, recent SDHs (s/p MME and evacuation) here with anemia at cancer center. Patient is nonverbal, bedbound, and with a PEG tube. s/p pRBC transfusion. Also on PO lasix, and on IV zosyn for possible infection. Seen by palliative care in the past, and daughter wanted full medical measures. Palliative reconsulted for GOC.    PERTINENT PM/SXH:   DM (diabetes mellitus)    MDS (myelodysplastic syndrome)    SDH (subdural hematoma)    Seizure disorder    Chronic kidney disease (CKD)      No significant past surgical history    H/O colonoscopy    S/P percutaneous endoscopic gastrostomy (PEG) tube placement    S/P craniotomy      FAMILY HISTORY:  no pertinent family history      SOCIAL HISTORY:   Significant other/partner[ ]  Children[X ]  Judaism/Spirituality:  Substance hx:  [ ]   Tobacco hx:  [ ]   Alcohol hx: [ ]   Living Situation: [ ]Home  [ ]Long term care  [ ]Rehab [ ]Other  Home Services: [ ] HHA [ ] Visting RN [ ] Hospice  Occupation:  Home Opioid hx:  [ ] Y [ ] N [ ] I-Stop Reference No:    ADVANCE DIRECTIVES:    [ ] Full Code [ ] DNR  MOLST  [ ]  Living Will  [ ]   DECISION MAKER(s):  [ ] Health Care Proxy(s)  [ ] Surrogate(s)  [ ] Guardian           Name(s): Phone Number(s): tree Garcia [phone]    BASELINE (I)ADL(s) (prior to admission):  [place]: [ ]Total  [ ] Moderate [ ]Dependent  Palliative Performance Status Version 2:         %    http://npcrc.org/files/news/palliative_performance_scale_ppsv2.pdf    Allergies    No Known Allergies    Intolerances    MEDICATIONS  (STANDING):  amantadine Syrup 50 milliGRAM(s) Oral two times a day  amLODIPine   Tablet 5 milliGRAM(s) Oral daily  chlorhexidine 2% Cloths 1 Application(s) Topical daily  furosemide    Tablet 40 milliGRAM(s) Oral daily  glucagon  Injectable 1 milliGRAM(s) IntraMuscular once  heparin   Injectable 5000 Unit(s) SubCutaneous every 12 hours  insulin glargine Injectable (LANTUS) 5 Unit(s) SubCutaneous at bedtime  insulin lispro (ADMELOG) corrective regimen sliding scale   SubCutaneous three times a day before meals  insulin lispro Injectable (ADMELOG) 2 Unit(s) SubCutaneous three times a day before meals  lacosamide Solution 200 milliGRAM(s) Oral two times a day  levETIRAcetam  Solution 1500 milliGRAM(s) Oral two times a day  pantoprazole   Suspension 40 milliGRAM(s) Oral daily  piperacillin/tazobactam IVPB.. 3.375 Gram(s) IV Intermittent every 8 hours  sodium zirconium cyclosilicate 10 Gram(s) Oral every 8 hours    MEDICATIONS  (PRN):      PRESENT SYMPTOMS: [X ]Unable to obtain due to poor mentation   Source if other than patient:  [ ]Family   [ ]Team   [ ]All review of systems negative including pain and dyspnea unless noted below    All components of pain assessment below addressed. Blank spaces indicate that the patient did/could not complete the assessment.  Pain: [ ]yes [ ]no  QOL impact -   Location -                    Aggravating factors -  Quality -  Radiation -  Timing-  Severity (0-10 scale):  Minimal acceptable level (0-10 scale):     CPOT:    https://www.[facility].org/getattachment/[email or web address]/Critical-Care-Pain-Observation-Tool-(CPOT)    PAIN AD Score: 0  http://geriatrictoolkit.[place].[facility]/cog/painad.pdf (press ctrl +  left click to view)    Dyspnea:                           [ ]None[ ]Mild [ ]Moderate [ ]Severe     Respiratory Distress Observation Scale (RDOS): 2  A score of 0 to 2 signifies little or no respiratory distress, 3 signifies mild distress, scores 4 to 6 indicate moderate distress, and scores greater than 7 signify severe distress  https://www.[facility].ca/sites/default/files/PDFS/421966-dslfgwoudrp-ddwalbld-bftdxynqizq-ebgsc.pdf    Anxiety:                             [ ]None[ ]Mild [ ]Moderate [ ]Severe   Fatigue:                             [ ]None[ ]Mild [ ]Moderate [ ]Severe   Nausea:                             [ ]None[ ]Mild [ ]Moderate [ ]Severe   Loss of appetite:              [ ]None[ ]Mild [ ]Moderate [ ]Severe   Constipation:                    [ ]None[ ]Mild [ ]Moderate [ ]Severe    Other Symptoms:      Palliative Performance Status Version 2:         %    http://npcrc.org/files/news/palliative_performance_scale_ppsv2.pdf  PHYSICAL EXAM:  Vital Signs Last 24 Hrs  T(C): 38.2 (02 Jan 2025 07:00), Max: 38.2 (02 Jan 2025 06:00)  T(F): 100.8 (02 Jan 2025 07:00), Max: 100.8 (02 Jan 2025 06:00)  HR: 109 (02 Jan 2025 09:00) (86 - 109)  BP: 169/71 (02 Jan 2025 09:00) (158/68 - 184/76)  BP(mean): 102 (02 Jan 2025 09:00) (98 - 112)  RR: 36 (02 Jan 2025 09:00) (14 - 37)  SpO2: 100% (02 Jan 2025 09:00) (99% - 100%)    Parameters below as of 02 Jan 2025 08:00  Patient On (Oxygen Delivery Method): nasal cannula  O2 Flow (L/min): 2   I&O's Summary    01 Jan 2025 07:01  -  02 Jan 2025 07:00  --------------------------------------------------------  IN: 1300 mL / OUT: 2205 mL / NET: -905 mL    02 Jan 2025 07:01  -  02 Jan 2025 10:08  --------------------------------------------------------  IN: 0 mL / OUT: 135 mL / NET: -135 mL        GENERAL:  [X ] No acute distress [ ]Lethargic  [ ]Unarousable  [ ]Verbal  [ ]Non-Verbal [ ]Cachexia    BEHAVIORAL/PSYCH:  [ ]Alert and Oriented x  [ ] Anxiety [ ] Delirium [ ] Agitation [X ] Calm   EYES: [ ] No scleral icterus [ ] Scleral icterus [ ] Closed  ENMT:  [ ]Dry mouth  [ ]No external oral lesions [ X] No external ear or nose lesions  CARDIOVASCULAR:  [ ]Regular [ ]Irregular [ ]Tachy [ ]Not Tachy  [ ][place] [ ] Edema [ ] No edema  PULMONARY:  [ ]Tachypnea  [ ]Audible excessive secretions [X ] No labored breathing [ ] labored breathing  GASTROINTESTINAL: [ ]Soft  [ ]Distended  [ X]Not distended [ ]Non tender [ ]Tender  MUSCULOSKELETAL: [ ]No clubbing [ ] clubbing  [ X] No cyanosis [ ] cyanosis  NEUROLOGIC: [ ]No focal deficits  [ ]Follows commands  [ ]Does not follow commands  [ ]Cognitive impairment  [ ]Dysphagia  [ ]Dysarthria  [ ]Paresis   SKIN: [ ] Jaundiced [X ] Non-jaundiced [ ]Rash [ ]No Rash [ ] Warm [ ] Dry  MISC/LINES: [ ] ET tube [ ] Trach [ ]NGT/OGT [ ]PEG [ ]Small    CRITICAL CARE:  [ ] Shock Present  [ ]Septic [ ]Cardiogenic [ ]Neurologic [ ]Hypovolemic  [ ]  Vasopressors [ ]  Inotropes   [ ]Respiratory failure present [ ]Mechanical ventilation [ ]Non-invasive ventilatory support [ ]High flow  [ ]Acute  [ ]Chronic [ ]Hypoxic  [ ]Hypercarbic [ ]Other  [ ]Other organ failure     LABS: reviewed by me, notable for: elevated BUN                        9.3    9.71  )-----------( 359      ( 02 Jan 2025 05:19 )             28.5   01-02    143  |  109  |  61[HH]  ----------------------------<  249[H]  4.6   |  23  |  1.1    Ca    9.0      02 Jan 2025 05:19  Phos  4.9     01-01  Mg     1.7     01-02    TPro  5.5[L]  /  Alb  3.4[L]  /  TBili  0.9  /  DBili  x   /  AST  21  /  ALT  16  /  AlkPhos  104  01-01      Urinalysis Basic - ( 02 Jan 2025 05:19 )    Color: x / Appearance: x / SG: x / pH: x  Gluc: 249 mg/dL / Ketone: x  / Bili: x / Urobili: x   Blood: x / Protein: x / Nitrite: x   Leuk Esterase: x / RBC: x / WBC x   Sq Epi: x / Non Sq Epi: x / Bacteria: x      RADIOLOGY & ADDITIONAL STUDIES: CXR personally reviewed by me: bilateral effusions    PROTEIN CALORIE MALNUTRITION PRESENT: [ ]mild [ ]moderate [ ]severe [ ]underweight [ ]morbid obesity  https://www.andeal.org/vault/2440/web/files/ONC/Table_Clinical%20Characteristics%20to%20Document%20Malnutrition-White%20JV%20et%20al%202012.pdf    Height (cm): 154.9 (01-01-25 @ 00:00), 162.6 (12-10-24 @ 11:19), 162.6 (11-12-24 @ 08:42)  Weight (kg): 65.9 (01-01-25 @ 00:00), 31.706 (12-12-24 @ 04:22), 66.4 (11-12-24 @ 08:42)  BMI (kg/m2): 27.5 (01-01-25 @ 00:00), 12 (12-12-24 @ 04:22), 25.1 (12-10-24 @ 11:19)    [ ]PPSV2 < or = to 30% [ ]significant weight loss  [ ]poor nutritional intake  [ ]anasarca      [ ]Artificial Nutrition          Palliative Care Spiritual/Emotional Screening Tool Question  Severity (0-4):                    OR                    [X ] Unable to determine/NA  Score of 2 or greater indicates recommendation of Chaplaincy referral  Chaplaincy Referral: [ ] Yes [ ] Refused [ ] Following     Caregiver [place]:  [ ] Yes [ ] No    OR    [x ] Unable to determine. Will assess at later time if appropriate.  Social Work Referral [ ]  Patient and Family Centered Care Referral [ ]    Anticipatory Grief Present: [ ] Yes [ ] No    OR     [ x] Unable to determine. Will assess at later time if appropriate.  Social Work Referral [ ]  Patient and Family Centered Care Referral [ ]    REFERRALS:   [ ]Chaplaincy  [ ]Hospice  [ ]Child Life  [ ]Social Work  [ ]Case management [ ]Holistic Therapy     Palliative care education provided to patient and/or family    Goals of Care Document:     ______________  Cy Morales MD  Palliative Medicine  [facility]   of Geriatric and Palliative Medicine  [phone]   HPI: 79F with PMH of MDS, DM, HTN, CKD2, stage 3 sacral ulcer, recent SDHs (s/p MME and evacuation) here with anemia at cancer center. Patient is nonverbal, bedbound, and with a PEG tube. s/p pRBC transfusion. Also on PO lasix, and on IV zosyn for possible infection. Seen by palliative care in the past, and daughter wanted full medical measures. Palliative reconsulted for GOC.    PERTINENT PM/SXH:   DM (diabetes mellitus)    MDS (myelodysplastic syndrome)    SDH (subdural hematoma)    Seizure disorder    Chronic kidney disease (CKD)      No significant past surgical history    H/O colonoscopy    S/P percutaneous endoscopic gastrostomy (PEG) tube placement    S/P craniotomy      FAMILY HISTORY:  no pertinent family history      SOCIAL HISTORY:   Significant other/partner[ ]  Children[X ]  Adventist/Spirituality:  Substance hx:  [ ]   Tobacco hx:  [ ]   Alcohol hx: [ ]   Living Situation: [ ]Home  [ ]Long term care  [ ]Rehab [ ]Other  Home Services: [ ] HHA [ ] Visting RN [ ] Hospice  Occupation:  Home Opioid hx:  [ ] Y [ ] N [ ] I-Stop Reference No:    ADVANCE DIRECTIVES:    [ ] Full Code [ ] DNR  MOLST  [ ]  Living Will  [ ]   DECISION MAKER(s):  [ ] Health Care Proxy(s)  [ ] Surrogate(s)  [ ] Guardian           Name(s): Phone Number(s): tree Garcia [phone]    BASELINE (I)ADL(s) (prior to admission):  [place]: [ ]Total  [ ] Moderate [ ]Dependent  Palliative Performance Status Version 2:         %    http://npcrc.org/files/news/palliative_performance_scale_ppsv2.pdf    Allergies    No Known Allergies    Intolerances    MEDICATIONS  (STANDING):  amantadine Syrup 50 milliGRAM(s) Oral two times a day  amLODIPine   Tablet 5 milliGRAM(s) Oral daily  chlorhexidine 2% Cloths 1 Application(s) Topical daily  furosemide    Tablet 40 milliGRAM(s) Oral daily  glucagon  Injectable 1 milliGRAM(s) IntraMuscular once  heparin   Injectable 5000 Unit(s) SubCutaneous every 12 hours  insulin glargine Injectable (LANTUS) 5 Unit(s) SubCutaneous at bedtime  insulin lispro (ADMELOG) corrective regimen sliding scale   SubCutaneous three times a day before meals  insulin lispro Injectable (ADMELOG) 2 Unit(s) SubCutaneous three times a day before meals  lacosamide Solution 200 milliGRAM(s) Oral two times a day  levETIRAcetam  Solution 1500 milliGRAM(s) Oral two times a day  pantoprazole   Suspension 40 milliGRAM(s) Oral daily  piperacillin/tazobactam IVPB.. 3.375 Gram(s) IV Intermittent every 8 hours  sodium zirconium cyclosilicate 10 Gram(s) Oral every 8 hours    MEDICATIONS  (PRN):      PRESENT SYMPTOMS: [X ]Unable to obtain due to poor mentation   Source if other than patient:  [ ]Family   [ ]Team   [ ]All review of systems negative including pain and dyspnea unless noted below    All components of pain assessment below addressed. Blank spaces indicate that the patient did/could not complete the assessment.  Pain: [ ]yes [ ]no  QOL impact -   Location -                    Aggravating factors -  Quality -  Radiation -  Timing-  Severity (0-10 scale):  Minimal acceptable level (0-10 scale):     CPOT:    https://www.[facility].org/getattachment/[email or web address]/Critical-Care-Pain-Observation-Tool-(CPOT)    PAIN AD Score: 0  http://geriatrictoolkit.[place].[facility]/cog/painad.pdf (press ctrl +  left click to view)    Dyspnea:                           [ ]None[ ]Mild [ ]Moderate [ ]Severe     Respiratory Distress Observation Scale (RDOS): 2  A score of 0 to 2 signifies little or no respiratory distress, 3 signifies mild distress, scores 4 to 6 indicate moderate distress, and scores greater than 7 signify severe distress  https://www.[facility].ca/sites/default/files/PDFS/097033-itnjxamtdmv-omqewhix-bfkhlgxovcs-axjsu.pdf    Anxiety:                             [ ]None[ ]Mild [ ]Moderate [ ]Severe   Fatigue:                             [ ]None[ ]Mild [ ]Moderate [ ]Severe   Nausea:                             [ ]None[ ]Mild [ ]Moderate [ ]Severe   Loss of appetite:              [ ]None[ ]Mild [ ]Moderate [ ]Severe   Constipation:                    [ ]None[ ]Mild [ ]Moderate [ ]Severe    Other Symptoms:      Palliative Performance Status Version 2:         %    http://npcrc.org/files/news/palliative_performance_scale_ppsv2.pdf  PHYSICAL EXAM:  Vital Signs Last 24 Hrs  T(C): 38.2 (02 Jan 2025 07:00), Max: 38.2 (02 Jan 2025 06:00)  T(F): 100.8 (02 Jan 2025 07:00), Max: 100.8 (02 Jan 2025 06:00)  HR: 109 (02 Jan 2025 09:00) (86 - 109)  BP: 169/71 (02 Jan 2025 09:00) (158/68 - 184/76)  BP(mean): 102 (02 Jan 2025 09:00) (98 - 112)  RR: 36 (02 Jan 2025 09:00) (14 - 37)  SpO2: 100% (02 Jan 2025 09:00) (99% - 100%)    Parameters below as of 02 Jan 2025 08:00  Patient On (Oxygen Delivery Method): nasal cannula  O2 Flow (L/min): 2   I&O's Summary    01 Jan 2025 07:01  -  02 Jan 2025 07:00  --------------------------------------------------------  IN: 1300 mL / OUT: 2205 mL / NET: -905 mL    02 Jan 2025 07:01  -  02 Jan 2025 10:08  --------------------------------------------------------  IN: 0 mL / OUT: 135 mL / NET: -135 mL        GENERAL:  [X ] No acute distress [ ]Lethargic  [ ]Unarousable  [ ]Verbal  [ ]Non-Verbal [ ]Cachexia    BEHAVIORAL/PSYCH:  [ ]Alert and Oriented x  [ ] Anxiety [ ] Delirium [ ] Agitation [X ] Calm   EYES: [ ] No scleral icterus [ ] Scleral icterus [ X] Closed  ENMT:  [ ]Dry mouth  [ ]No external oral lesions [ X] No external ear or nose lesions  CARDIOVASCULAR:  [ ]Regular [ ]Irregular [ ]Tachy [ ]Not Tachy  [ ][place] [ ] Edema [ ] No edema  PULMONARY:  [ ]Tachypnea  [ ]Audible excessive secretions [X ] No labored breathing [ ] labored breathing  GASTROINTESTINAL: [ ]Soft  [ ]Distended  [ X]Not distended [ ]Non tender [ ]Tender  MUSCULOSKELETAL: [ ]No clubbing [ ] clubbing  [ X] No cyanosis [ ] cyanosis  NEUROLOGIC: [ ]No focal deficits  [ ]Follows commands  [ ]Does not follow commands  [X ]Cognitive impairment  [ ]Dysphagia  [ ]Dysarthria  [ ]Paresis   SKIN: [ ] Jaundiced [X ] Non-jaundiced [ ]Rash [ ]No Rash [ ] Warm [ ] Dry  MISC/LINES: [ ] ET tube [ ] Trach [ ]NGT/OGT [ ]PEG [ ]Small    CRITICAL CARE:  [ ] Shock Present  [ ]Septic [ ]Cardiogenic [ ]Neurologic [ ]Hypovolemic  [ ]  Vasopressors [ ]  Inotropes   [ ]Respiratory failure present [ ]Mechanical ventilation [ ]Non-invasive ventilatory support [ ]High flow  [ ]Acute  [ ]Chronic [ ]Hypoxic  [ ]Hypercarbic [ ]Other  [ ]Other organ failure     LABS: reviewed by me, notable for: elevated BUN                        9.3    9.71  )-----------( 359      ( 02 Jan 2025 05:19 )             28.5   01-02    143  |  109  |  61[HH]  ----------------------------<  249[H]  4.6   |  23  |  1.1    Ca    9.0      02 Jan 2025 05:19  Phos  4.9     01-01  Mg     1.7     01-02    TPro  5.5[L]  /  Alb  3.4[L]  /  TBili  0.9  /  DBili  x   /  AST  21  /  ALT  16  /  AlkPhos  104  01-01      Urinalysis Basic - ( 02 Jan 2025 05:19 )    Color: x / Appearance: x / SG: x / pH: x  Gluc: 249 mg/dL / Ketone: x  / Bili: x / Urobili: x   Blood: x / Protein: x / Nitrite: x   Leuk Esterase: x / RBC: x / WBC x   Sq Epi: x / Non Sq Epi: x / Bacteria: x      RADIOLOGY & ADDITIONAL STUDIES: CXR personally reviewed by me: bilateral effusions    PROTEIN CALORIE MALNUTRITION PRESENT: [ ]mild [ ]moderate [ ]severe [ ]underweight [ ]morbid obesity  https://www.andeal.org/vault/2440/web/files/ONC/Table_Clinical%20Characteristics%20to%20Document%20Malnutrition-White%20JV%20et%20al%202012.pdf    Height (cm): 154.9 (01-01-25 @ 00:00), 162.6 (12-10-24 @ 11:19), 162.6 (11-12-24 @ 08:42)  Weight (kg): 65.9 (01-01-25 @ 00:00), 31.706 (12-12-24 @ 04:22), 66.4 (11-12-24 @ 08:42)  BMI (kg/m2): 27.5 (01-01-25 @ 00:00), 12 (12-12-24 @ 04:22), 25.1 (12-10-24 @ 11:19)    [ ]PPSV2 < or = to 30% [ ]significant weight loss  [ ]poor nutritional intake  [ ]anasarca      [ ]Artificial Nutrition          Palliative Care Spiritual/Emotional Screening Tool Question  Severity (0-4):                    OR                    [X ] Unable to determine/NA  Score of 2 or greater indicates recommendation of Chaplaincy referral  Chaplaincy Referral: [ ] Yes [ ] Refused [ ] Following     Caregiver [place]:  [ ] Yes [ ] No    OR    [x ] Unable to determine. Will assess at later time if appropriate.  Social Work Referral [ ]  Patient and Family Centered Care Referral [ ]    Anticipatory Grief Present: [ ] Yes [ ] No    OR     [ x] Unable to determine. Will assess at later time if appropriate.  Social Work Referral [ ]  Patient and Family Centered Care Referral [ ]    REFERRALS:   [ ]Chaplaincy  [ ]Hospice  [ ]Child Life  [ ]Social Work  [ ]Case management [ ]Holistic Therapy     Palliative care education provided to patient and/or family    Goals of Care Document:     ______________  Cy Morales MD  Palliative Medicine  [facility]   of Geriatric and Palliative Medicine  [phone]

## 2025-01-02 NOTE — PROGRESS NOTE ADULT - ASSESSMENT
ASSESSMENT  This is a 79-year-old woman with PMHx of myelodysplastic syndrome (MDS) ,DM, HTN, CKD 2, and multiple recent admissions for a b/l subdural hematomas (SDH) with mass effect and midline shift (status-post evacuation and middle meningeal artery embolization complicated by a leaking craniotomy site and concern for status epilepticus) presented to the ED anemia.    IMPRESSION  #Anemia  #MDS, transfusion dependent- Revlimid and Vidaza, currently on Lusparacept, Last dose of Luspatercept documented was 10/1/34  #Fever  -  MRSA nares negative     #Pyruia- Hard to differentiate if asymptomatic vs real infection.   - Renal US 1/1 - no hydro   - Urine Cx 12/31 NG     #Patient Non-verbal and functional quadriplegic +Peg tube.   #Chronic respiratory failure. Bilateral pleural effusions  #Was managed for pneumonia and had thoracentesis 12/11/2024. Cultures negative   #Sacral Ulcer noted. 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    RECOMMENDATIONS  - WBC improving, but febrile -- renal US without hydro and urine Cx 12/31 NG   - check flu/RSV/COVID  - continue zosyn 3.375 mg q 8 hours for now as WBC improving and hypotension  - trend fever curve     Please call or message on Microsoft Teams if with any questions.  Spectra [phone]

## 2025-01-02 NOTE — DIETITIAN INITIAL EVALUATION ADULT - ENTERAL
Continue bolus feeds of Nepro formula recommend to increase bolus as tolerated to goal rate of 275 mL q6hrs   This will provide: 1100 mL total volume, 1980 kcal, 89.1 gm Protein, 803 mL free water from formula + recommend 75 mL water flushes pre/post feeds = 1403 mL total water (or per CCU team)

## 2025-01-02 NOTE — PROGRESS NOTE ADULT - ASSESSMENT
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   Chronic bilateral effusions   CKD2 (Creat at baseline)  Fever due to possible UTI  Stage 3 sacral ulcer  Diarrhea  HTN  DM  HO SDH (sp evacuation and middle meningeal artery embolization complicated by a leaking craniotomy site and concern for status epilepticus)      PLAN:    CNS: Avoid CNS Depressants. c/w home antiepileptics. Recall palliative care.    HEENT: Oral care.  Aspiration precautions     PULMONARY: CXR with chronic effusions.     CARDIOVASCULAR: PO 40 daily Lasix.  BNP elevated.  Off pressors.     GI: GI prophylaxis. PEG feeding nepro.  o l onger having diarrhea.     RENAL: DC Haylie.  Nephrology eval noted.    INFECTIOUS DISEASE: ID eval noted.  On Zosyn.  Procal 0.65.  Sacral ulcer does not look infected.    HEMATOLOGICAL: Responded to the transfusion. Hg stable.  DVT ppx.  CBC in the afternoon.     ENDOCRINE: Monitor FS. Resume home insulin regimen.    MUSCULOSKELETAL: Bedrest.  WC eval for sacral ulcer.    CODE STATUS: FULL  DISPOSITION: Floor  Pall care eval. Poor prognosis.   LIBIA ocampo

## 2025-01-02 NOTE — HOSPICE CARE NOTE - CONVESATION DETAILS
Reviewed hospice philosophy and services with Jose. Jose aware that once hospice services are elected she would no longer receive blood draws, no further blood transfusion, no IV fluids, no outpatient follow up with oncologist. Reinforced the hospice is supportive comfort care once family and patient elects to stop all aggressive measures. Jose requesting to discuss further with her family an call hospice with a decision.

## 2025-01-02 NOTE — DIETITIAN INITIAL EVALUATION ADULT - PERTINENT LABORATORY DATA
01-02    143  |  109  |  61[HH]  ----------------------------<  249[H]  4.6   |  23  |  1.1    Ca    9.0      02 Jan 2025 05:19  Phos  4.9     01-01  Mg     1.7     01-02    TPro  5.5[L]  /  Alb  3.4[L]  /  TBili  0.9  /  DBili  x   /  AST  21  /  ALT  16  /  AlkPhos  104  01-01  POCT Blood Glucose.: 143 mg/dL (01-02-25 @ 11:07)  A1C with Estimated Average Glucose Result: 7.1 % (12-14-24 @ 06:25)  A1C with Estimated Average Glucose Result: 7.6 % (12-13-24 @ 06:57)  A1C with Estimated Average Glucose Result: 6.0 % (10-09-24 @ 02:40)

## 2025-01-02 NOTE — DIETITIAN INITIAL EVALUATION ADULT - PERTINENT MEDS FT
MEDICATIONS  (STANDING):  amantadine Syrup 50 milliGRAM(s) Oral two times a day  amLODIPine   Tablet 5 milliGRAM(s) Oral daily  chlorhexidine 2% Cloths 1 Application(s) Topical daily  furosemide    Tablet 40 milliGRAM(s) Oral daily  glucagon  Injectable 1 milliGRAM(s) IntraMuscular once  heparin   Injectable 5000 Unit(s) SubCutaneous every 12 hours  insulin glargine Injectable (LANTUS) 5 Unit(s) SubCutaneous at bedtime  insulin lispro (ADMELOG) corrective regimen sliding scale   SubCutaneous three times a day before meals  insulin lispro Injectable (ADMELOG) 2 Unit(s) SubCutaneous three times a day before meals  lacosamide Solution 200 milliGRAM(s) Oral two times a day  levETIRAcetam  Solution 1500 milliGRAM(s) Oral two times a day  pantoprazole   Suspension 40 milliGRAM(s) Oral daily  piperacillin/tazobactam IVPB.. 3.375 Gram(s) IV Intermittent every 8 hours  sodium zirconium cyclosilicate 10 Gram(s) Oral every 8 hours    MEDICATIONS  (PRN):

## 2025-01-02 NOTE — DIETITIAN INITIAL EVALUATION ADULT - OTHER CALCULATIONS
Energy: 1647 - 1977 kcal/day (25-30 kcal/kg)   Protein: 79 - 99 gm/day (1.2 - 1.5 gm/kg)  Fluid: 1 mL/kcal   estimated needs with consideration for age, BMI, critical care setting, Pressure injuries to sacrum - stage 3 and b/l buttocks DTI

## 2025-01-02 NOTE — DIETITIAN INITIAL EVALUATION ADULT - NS FNS DIET ORDER
Diet, NPO with Tube Feed:   Tube Feeding Modality: Gastrostomy  Nepro with Carb Steady (NEPRORTH)  Total Volume for 24 Hours (mL): 600  Bolus  Total Volume of Bolus (mL):  200  Total # of Feeds: 3  Tube Feed Frequency: Every 8 hours   Tube Feed Start Time: 06:00  Bolus Feed Rate (mL per Hour): 200   Bolus Feed Duration (in Hours): 1  Free Water Flush  Pump  Free Water Flush Instructions:  75cc before and after feeds (12-31-24 @ 23:31) [Active]

## 2025-01-02 NOTE — PROGRESS NOTE ADULT - ASSESSMENT
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   Chronic bilateral effusions   CKD2 (Creat at baseline)  Fever due to possible UTI  Stage 3 sacral ulcer  Diarrhea  HTN  DM  HO SDH (sp evacuation and middle meningeal artery embolization complicated by a leaking craniotomy site and concern for status epilepticus)      PLAN:    CNS:   -Avoid CNS Depressants.   -c/w home antiepileptics.   -Recall palliative care.    HEENT: Oral care.  Aspiration precautions     PULMONARY:   -CXR with chronic effusions.     CARDIOVASCULAR:   -PO 40 daily Lasix.    -BNP elevated.    -Off pressors.     GI:   -GI prophylaxis.   -PEG feeding nepro.   - no longer having diarrhea.     RENAL:   -DC Lokelma.    -Nephrology eval noted.  - dc ocampo    INFECTIOUS DISEASE:   -ID eval noted.  On Zosyn.   - Procal 0.65.    -Sacral ulcer w/o signs of infection.    HEMATOLOGICAL:   -Responded to the transfusion. Hg stable.    -DVT ppx.    -CBC in the afternoon.     ENDOCRINE:   -Monitor FS.   -Resume home insulin regimen.    MUSCULOSKELETAL: Bedrest.  WC eval for sacral ulcer.    CODE STATUS: FULL  DISPOSITION: DGTF  Pall care eval. Poor prognosis.

## 2025-01-02 NOTE — PROGRESS NOTE ADULT - SUBJECTIVE AND OBJECTIVE BOX
Patient is a 79y old  Female who presents with a chief complaint of     Over Night Events: Febrile overnight and this morning.  NO gtts.  On 2l NC        ROS:     All ROS are negative except HPI         PHYSICAL EXAM    ICU Vital Signs Last 24 Hrs  T(C): 38.2 (02 Jan 2025 07:00), Max: 38.2 (02 Jan 2025 06:00)  T(F): 100.8 (02 Jan 2025 07:00), Max: 100.8 (02 Jan 2025 06:00)  HR: 109 (02 Jan 2025 09:00) (86 - 109)  BP: 169/71 (02 Jan 2025 09:00) (158/68 - 184/76)  BP(mean): 102 (02 Jan 2025 09:00) (98 - 112)  ABP: --  ABP(mean): --  RR: 36 (02 Jan 2025 09:00) (14 - 37)  SpO2: 100% (02 Jan 2025 09:00) (99% - 100%)    O2 Parameters below as of 02 Jan 2025 08:00  Patient On (Oxygen Delivery Method): nasal cannula  O2 Flow (L/min): 2          CONSTITUTIONAL:  Ill appearing    ENT:   Airway patent,   Mouth with normal mucosa.   No thrush    EYES:   Pupils equal,   Round and reactive to light.    CARDIAC:   Normal rate,   Regular rhythm.    No edema    RESPIRATORY:   No wheezing  Bilateral BS  Normal chest expansion  Not tachypneic,  No use of accessory muscles    GASTROINTESTINAL:  Abdomen soft,   Non-tender,   No guarding,   + BS    MUSCULOSKELETAL:   No clubbing, cyanosis    NEUROLOGICAL:   Nonverbal  No motor  deficits.    SKIN:   Skin normal color for race,   Stage 3 sacral DTI  No evidence of rash.        01-01-25 @ 07:01  -  01-02-25 @ 07:00  --------------------------------------------------------  IN:    Enteral Tube Flush: 150 mL    Free Water: 300 mL    IV PiggyBack: 250 mL    Nepro with Carb Steady: 600 mL  Total IN: 1300 mL    OUT:    Indwelling Catheter - Urethral (mL): 2005 mL    Rectal Tube (mL): 200 mL  Total OUT: 2205 mL    Total NET: -905 mL      01-02-25 @ 07:01  -  01-02-25 @ 09:40  --------------------------------------------------------  IN:  Total IN: 0 mL    OUT:    Indwelling Catheter - Urethral (mL): 135 mL  Total OUT: 135 mL    Total NET: -135 mL          LABS:                            9.3    9.71  )-----------( 359      ( 02 Jan 2025 05:19 )             28.5                                               01-02    143  |  109  |  61[HH]  ----------------------------<  249[H]  4.6   |  23  |  1.1    Ca    9.0      02 Jan 2025 05:19  Phos  4.9     01-01  Mg     1.7     01-02    TPro  5.5[L]  /  Alb  3.4[L]  /  TBili  0.9  /  DBili  x   /  AST  21  /  ALT  16  /  AlkPhos  104  01-01                                             Urinalysis Basic - ( 02 Jan 2025 05:19 )    Color: x / Appearance: x / SG: x / pH: x  Gluc: 249 mg/dL / Ketone: x  / Bili: x / Urobili: x   Blood: x / Protein: x / Nitrite: x   Leuk Esterase: x / RBC: x / WBC x   Sq Epi: x / Non Sq Epi: x / Bacteria: x                                                  LIVER FUNCTIONS - ( 01 Jan 2025 04:24 )  Alb: 3.4 g/dL / Pro: 5.5 g/dL / ALK PHOS: 104 U/L / ALT: 16 U/L / AST: 21 U/L / GGT: x                                                  Urinalysis with Rflx Culture (collected 31 Dec 2024 21:53)                                                                                       ABG - ( 01 Jan 2025 00:39 )  pH, Arterial: 7.37  pH, Blood: x     /  pCO2: 44    /  pO2: 133   / HCO3: 25    / Base Excess: -0.2  /  SaO2: 99.8                MEDICATIONS  (STANDING):  albuterol    90 MICROgram(s) HFA Inhaler 2 Puff(s) Inhalation every 6 hours  amantadine Syrup 50 milliGRAM(s) Oral two times a day  amLODIPine   Tablet 5 milliGRAM(s) Oral daily  chlorhexidine 2% Cloths 1 Application(s) Topical daily  furosemide    Tablet 40 milliGRAM(s) Oral daily  glucagon  Injectable 1 milliGRAM(s) IntraMuscular once  heparin   Injectable 5000 Unit(s) SubCutaneous every 12 hours  insulin glargine Injectable (LANTUS) 5 Unit(s) SubCutaneous at bedtime  insulin lispro (ADMELOG) corrective regimen sliding scale   SubCutaneous three times a day before meals  insulin lispro Injectable (ADMELOG) 2 Unit(s) SubCutaneous three times a day before meals  lacosamide Solution 200 milliGRAM(s) Oral two times a day  levETIRAcetam  Solution 1500 milliGRAM(s) Oral two times a day  pantoprazole   Suspension 40 milliGRAM(s) Oral daily  piperacillin/tazobactam IVPB.. 3.375 Gram(s) IV Intermittent every 8 hours  sodium zirconium cyclosilicate 10 Gram(s) Oral every 8 hours    MEDICATIONS  (PRN):      New X-rays reviewed:                                                                                  ECHO    CXR interpreted by me:

## 2025-01-02 NOTE — DIETITIAN INITIAL EVALUATION ADULT - NSFNSGIIOFT_GEN_A_CORE
01-01-25 @ 07:01  -  01-02-25 @ 07:00  --------------------------------------------------------  OUT:    Rectal Tube (mL): 200 mL  Total OUT: 200 mL    Total NET: 400 mL      01-02-25 @ 07:01  -  01-02-25 @ 14:58  --------------------------------------------------------  OUT:  Total OUT: 0 mL    Total NET: 200 mL

## 2025-01-03 PROBLEM — G40.909 EPILEPSY, UNSPECIFIED, NOT INTRACTABLE, WITHOUT STATUS EPILEPTICUS: Chronic | Status: ACTIVE | Noted: 2024-12-10

## 2025-01-03 PROBLEM — S06.5XAA TRAUMATIC SUBDURAL HEMORRHAGE WITH LOSS OF CONSCIOUSNESS STATUS UNKNOWN, INITIAL ENCOUNTER: Chronic | Status: ACTIVE | Noted: 2024-12-10

## 2025-01-03 NOTE — PROGRESS NOTE ADULT - SUBJECTIVE AND OBJECTIVE BOX
HPI: 79F with PMH of MDS, DM, HTN, CKD2, stage 3 sacral ulcer, recent SDHs (s/p MME and evacuation) here with anemia at cancer center. Patient is nonverbal, bedbound, and with a PEG tube. s/p pRBC transfusion. Also on PO lasix, and on IV zosyn for possible infection. Seen by palliative care in the past, and daughter wanted full medical measures. Palliative reconsulted for GOC.    INTERVAL EVENTS  1/3/25: patient comfortable appearing    ADVANCE DIRECTIVES:    [X ] Full Code [ ] DNR  MOLST  [ ]  Living Will  [ ]   DECISION MAKER(s):  [ ] Health Care Proxy(s)  [ ] Surrogate(s)  [ ] Guardian           Name(s): Phone Number(s): tree Garcia [phone]    BASELINE (I)ADL(s) (prior to admission):  [place]: [ ]Total  [ ] Moderate [ ]Dependent  Palliative Performance Status Version 2:         %    http://npcrc.org/files/news/palliative_performance_scale_ppsv2.pdf    Allergies    No Known Allergies    Intolerances    MEDICATIONS  (STANDING):  amantadine Syrup 50 milliGRAM(s) Oral two times a day  chlorhexidine 2% Cloths 1 Application(s) Topical daily  furosemide    Tablet 40 milliGRAM(s) Oral daily  glucagon  Injectable 1 milliGRAM(s) IntraMuscular once  heparin   Injectable 5000 Unit(s) SubCutaneous every 12 hours  insulin glargine Injectable (LANTUS) 5 Unit(s) SubCutaneous at bedtime  insulin lispro (ADMELOG) corrective regimen sliding scale   SubCutaneous three times a day before meals  insulin lispro Injectable (ADMELOG) 2 Unit(s) SubCutaneous three times a day before meals  lacosamide Solution 200 milliGRAM(s) Oral two times a day  levETIRAcetam  Solution 1500 milliGRAM(s) Oral two times a day  magnesium sulfate  IVPB 2 Gram(s) IV Intermittent once  pantoprazole   Suspension 40 milliGRAM(s) Oral daily  piperacillin/tazobactam IVPB.. 3.375 Gram(s) IV Intermittent every 8 hours    MEDICATIONS  (PRN):        PRESENT SYMPTOMS: [X ]Unable to obtain due to poor mentation   Source if other than patient:  [ ]Family   [ ]Team   [ ]All review of systems negative including pain and dyspnea unless noted below    All components of pain assessment below addressed. Blank spaces indicate that the patient did/could not complete the assessment.  Pain: [ ]yes [ ]no  QOL impact -   Location -                    Aggravating factors -  Quality -  Radiation -  Timing-  Severity (0-10 scale):  Minimal acceptable level (0-10 scale):     CPOT:    https://www.[facility].org/getattachment/fua50f79-9e9z-7a9e-[email or web address]/Critical-Care-Pain-Observation-Tool-(CPOT)    PAIN AD Score: 0  [email or web address].[email or web address]/cog/painad.pdf (press ctrl +  left click to view)    Dyspnea:                           [ ]None[ ]Mild [ ]Moderate [ ]Severe     Respiratory Distress Observation Scale (RDOS): 2  A score of 0 to 2 signifies little or no respiratory distress, 3 signifies mild distress, scores 4 to 6 indicate moderate distress, and scores greater than 7 signify severe distress  https://[email or web address].ca/sites/default/files/PDFS/294986-lgtrtmlrzqp-kdghhxxa-zwoaagogkgh-bjxdj.pdf    Anxiety:                             [ ]None[ ]Mild [ ]Moderate [ ]Severe   Fatigue:                             [ ]None[ ]Mild [ ]Moderate [ ]Severe   Nausea:                             [ ]None[ ]Mild [ ]Moderate [ ]Severe   Loss of appetite:              [ ]None[ ]Mild [ ]Moderate [ ]Severe   Constipation:                    [ ]None[ ]Mild [ ]Moderate [ ]Severe    Other Symptoms:      Palliative Performance Status Version 2:         %    http://[facility].org/files/news/palliative_performance_scale_ppsv2.pdf  PHYSICAL EXAM:  Vital Signs Last 24 Hrs  T(C): 36.7 (03 Jan 2025 12:27), Max: 38.1 (02 Jan 2025 16:00)  T(F): 98 (03 Jan 2025 12:27), Max: 100.6 (02 Jan 2025 16:00)  HR: 101 (03 Jan 2025 12:27) (100 - 114)  BP: 174/84 (03 Jan 2025 12:27) (145/65 - 183/76)  BP(mean): 104 (02 Jan 2025 18:20) (104 - 111)  RR: 20 (03 Jan 2025 12:27) (19 - 42)  SpO2: 97% (03 Jan 2025 12:27) (87% - 100%)    Parameters below as of 02 Jan 2025 20:00  Patient On (Oxygen Delivery Method): nasal cannula            GENERAL:  [X ] No acute distress [ ]Lethargic  [ ]Unarousable  [ ]Verbal  [ ]Non-Verbal [ ]Cachexia    BEHAVIORAL/PSYCH:  [ ]Alert and Oriented x  [ ] Anxiety [ ] Delirium [ ] Agitation [X ] Calm   EYES: [ ] No scleral icterus [ ] Scleral icterus [ X] Closed  ENMT:  [ ]Dry mouth  [ ]No external oral lesions [ X] No external ear or nose lesions  CARDIOVASCULAR:  [ ]Regular [ ]Irregular [ ]Tachy [ ]Not Tachy  [ ][place] [ ] Edema [ ] No edema  PULMONARY:  [ ]Tachypnea  [ ]Audible excessive secretions [X ] No labored breathing [ ] labored breathing  GASTROINTESTINAL: [ ]Soft  [ ]Distended  [ X]Not distended [ ]Non tender [ ]Tender  MUSCULOSKELETAL: [ ]No clubbing [ ] clubbing  [ X] No cyanosis [ ] cyanosis  NEUROLOGIC: [ ]No focal deficits  [ ]Follows commands  [ ]Does not follow commands  [X ]Cognitive impairment  [ ]Dysphagia  [ ]Dysarthria  [ ]Paresis   SKIN: [ ] Jaundiced [X ] Non-jaundiced [ ]Rash [ ]No Rash [ ] Warm [ ] Dry  MISC/LINES: [ ] ET tube [ ] Trach [ ]NGT/OGT [ ]PEG [ ]Small    CRITICAL CARE:  [ ] Shock Present  [ ]Septic [ ]Cardiogenic [ ]Neurologic [ ]Hypovolemic  [ ]  Vasopressors [ ]  Inotropes   [ ]Respiratory failure present [ ]Mechanical ventilation [ ]Non-invasive ventilatory support [ ]High flow  [ ]Acute  [ ]Chronic [ ]Hypoxic  [ ]Hypercarbic [ ]Other  [ ]Other organ failure     LABS: reviewed by me, notable for: elevated WBC                                   8.9    12.17 )-----------( 383      ( 03 Jan 2025 07:19 )             27.9     01-03    146  |  110  |  60[H]  ----------------------------<  219[H]  4.0   |  24  |  1.1    Ca    8.6      03 Jan 2025 07:19  Mg     1.7     01-03          RADIOLOGY & ADDITIONAL STUDIES: CXR personally reviewed by me: bilateral effusions    PROTEIN CALORIE MALNUTRITION PRESENT: [ ]mild [ ]moderate [ ]severe [ ]underweight [ ]morbid obesity  https://www.andeal.org/vault/2440/web/files/ONC/Table_Clinical%20Characteristics%20to%20Document%20Malnutrition-White%20JV%20et%20al%202012.pdf    Height (cm): 154.9 (01-01-25 @ 00:00), 162.6 (12-10-24 @ 11:19), 162.6 (11-12-24 @ 08:42)  Weight (kg): 65.9 (01-01-25 @ 00:00), 31.706 (12-12-24 @ 04:22), 66.4 (11-12-24 @ 08:42)  BMI (kg/m2): 27.5 (01-01-25 @ 00:00), 12 (12-12-24 @ 04:22), 25.1 (12-10-24 @ 11:19)    [ ]PPSV2 < or = to 30% [ ]significant weight loss  [ ]poor nutritional intake  [ ]anasarca      [ ]Artificial Nutrition          Palliative Care Spiritual/Emotional Screening Tool Question  Severity (0-4):                    OR                    [X ] Unable to determine/NA  Score of 2 or greater indicates recommendation of Chaplaincy referral  Chaplaincy Referral: [ ] Yes [ ] Refused [ ] Following     Caregiver [place]:  [ ] Yes [ ] No    OR    [x ] Unable to determine. Will assess at later time if appropriate.  Social Work Referral [ ]  Patient and Family Centered Care Referral [ ]    Anticipatory Grief Present: [ ] Yes [ ] No    OR     [ x] Unable to determine. Will assess at later time if appropriate.  Social Work Referral [ ]  Patient and Family Centered Care Referral [ ]    REFERRALS:   [ ]Chaplaincy  [ ]Hospice  [ ]Child Life  [ ]Social Work  [ ]Case management [ ]Holistic Therapy     Palliative care education provided to patient and/or family    Goals of Care Document:     ______________  Cy Morales MD  Palliative Medicine  [facility]   of Geriatric and Palliative Medicine  [phone]

## 2025-01-03 NOTE — PROVIDER CONTACT NOTE (OTHER) - BACKGROUND
Patient downgraded from SDU. Patient arrived to unit 3B at 1830; MD called 1831; MD at bedside 1832.
Patient on trail of void, ocampo cath was removed on 12/2/25. No bladder scan order or TOV instructions ordered. MD Chery made aware.

## 2025-01-03 NOTE — PROGRESS NOTE ADULT - ASSESSMENT
79F with PMH of MDS, DM, HTN, CKD2, stage 3 sacral ulcer, recent SDHs (s/p MME and evacuation) here with anemia at cancer center. Patient is nonverbal, bedbound, and with a PEG tube. s/p pRBC transfusion. Also on PO lasix, and on IV zosyn for possible infection. Seen by palliative care in the past, and daughter wanted full medical measures. Palliative consulted for GOC.    - c/w IV abx, AEDs, tube feeds for now  - see GOC above  - DNR/DNI  - will follow    ______________  Cy Morales MD  Palliative Medicine  [facility]   of Geriatric and Palliative Medicine  [phone]

## 2025-01-03 NOTE — PROGRESS NOTE ADULT - SUBJECTIVE AND OBJECTIVE BOX
SUBJECTIVE/OVERNIGHT EVENTS  Today is hospital day 3d. This morning patient was seen and examined at bedside, resting comfortably in bed. No acute or major events overnight.    MEDICATIONS  STANDING MEDICATIONS  amantadine Syrup 50 milliGRAM(s) Oral two times a day  amLODIPine   Tablet 5 milliGRAM(s) Oral daily  chlorhexidine 2% Cloths 1 Application(s) Topical daily  furosemide    Tablet 40 milliGRAM(s) Oral daily  glucagon  Injectable 1 milliGRAM(s) IntraMuscular once  heparin   Injectable 5000 Unit(s) SubCutaneous every 12 hours  insulin glargine Injectable (LANTUS) 5 Unit(s) SubCutaneous at bedtime  insulin lispro (ADMELOG) corrective regimen sliding scale   SubCutaneous three times a day before meals  insulin lispro Injectable (ADMELOG) 2 Unit(s) SubCutaneous three times a day before meals  lacosamide Solution 200 milliGRAM(s) Oral two times a day  levETIRAcetam  Solution 1500 milliGRAM(s) Oral two times a day  pantoprazole   Suspension 40 milliGRAM(s) Oral daily  piperacillin/tazobactam IVPB.. 3.375 Gram(s) IV Intermittent every 8 hours    PRN MEDICATIONS    VITALS  T(F): 98.4 (01-03-25 @ 05:24), Max: 100.6 (01-02-25 @ 16:00)  HR: 100 (01-03-25 @ 05:24) (100 - 114)  BP: 168/81 (01-03-25 @ 05:24) (145/65 - 183/76)  RR: 19 (01-03-25 @ 05:24) (19 - 42)  SpO2: 99% (01-03-25 @ 05:24) (87% - 100%)  POCT Blood Glucose.: 218 mg/dL (01-03-25 @ 07:41)  POCT Blood Glucose.: 98 mg/dL (01-02-25 @ 20:50)  POCT Blood Glucose.: 139 mg/dL (01-02-25 @ 16:35)  POCT Blood Glucose.: 143 mg/dL (01-02-25 @ 11:07)    PHYSICAL EXAM  GENERAL  ( + ) NAD, lying in bed comfortably     (  ) obtunded     (  ) lethargic     (  ) somnolent    HEAD  (+  ) Atraumatic     (  ) hematoma     (  ) laceration (specify location:       )     NECK  (  +) Supple     (  ) neck stiffness     (  ) nuchal rigidity     (  )  no JVD     (  ) JVD present ( -- cm)    HEART  Rate -->  (  ) normal rate    (  ) bradycardic    ( + ) tachycardic  Rhythm -->  (  ) regular    (  ) regularly irregular    (  ) irregularly irregular  Murmurs -->  (  ) normal s1/s2    (  ) systolic murmur    (  ) diastolic murmur    (  ) continuous murmur     (  ) S3 present    (  ) S4 present    LUNGS  ( + )Unlabored respirations     (  ) tachypnea  ( + ) B/L air entry     (  ) decreased breath sounds in:  (location     )    (  ) no adventitious sound     (  ) crackles     (  ) wheezing      (  ) rhonchi      (specify location:       )  (  ) chest wall tenderness (specify location:       )    ABDOMEN  ( + ) Soft     (  ) tense   |   (  ) nondistended     (  ) distended   |   (  ) +BS     (  ) hypoactive bowel sounds     (  ) hyperactive bowel sounds  (  ) nontender     (  ) RUQ tenderness     (  ) RLQ tenderness     (  ) LLQ tenderness     (  ) epigastric tenderness     (  ) diffuse tenderness  (  ) Splenomegaly      (  ) Hepatomegaly      (  ) Jaundice     (  ) ecchymosis     EXTREMITIES  ( + ) U/L E edema (  ) Rash     (  ) ecchymosis     (  ) varicose veins      (  ) pitting edema     (  ) non-pitting edema   (  ) ulceration     (  ) gangrene:     (location:     )    NERVOUS SYSTEM  (  +) A&Ox0     (  ) confused     (  ) lethargic  CN II-XII:     (  ) Intact     (  ) focal deficits  (Specify:     )   Upper extremities:     (  ) strength X/5     (  ) focal deficit (specify:    )  Lower extremities:     (  ) strength  X/5    (  ) focal deficit (specify:    )    SKIN  ( + ) No rashes or lesions     (  ) maculopapular rash     (  ) pustules     (  ) vesicles     (  ) ulcer     (  ) ecchymosis     (specify location:     )    LABS             8.9    13.90 )-----------( 388      ( 01-02-25 @ 22:45 )             27.4     143  |  109  |  61  -------------------------<  249   01-02-25 @ 05:19  4.6  |  23  |  1.1    Ca      9.0     01-02-25 @ 05:19  Mg     1.7     01-02-25 @ 05:19    Pro-Brain Natriuretic Peptide: 9727 pg/mL (01-01-25 @ 13:06)    Urinalysis Basic - ( 02 Jan 2025 05:19 )    Color: x / Appearance: x / SG: x / pH: x  Gluc: 249 mg/dL / Ketone: x  / Bili: x / Urobili: x   Blood: x / Protein: x / Nitrite: x   Leuk Esterase: x / RBC: x / WBC x   Sq Epi: x / Non Sq Epi: x / Bacteria: x    Culture - Blood (collected 01 Jan 2025 07:53)  Source: .Blood BLOOD  Preliminary Report (02 Jan 2025 17:01):    No growth at 24 hours    Urinalysis with Rflx Culture (collected 31 Dec 2024 21:53)    Culture - Urine (collected 31 Dec 2024 21:53)  Source: Clean Catch None  Final Report (02 Jan 2025 10:16):    <10,000 CFU/mL Normal Urogenital Jacqueline    IMAGING   Xray Chest (01.03.25 @ 06:22)     IMPRESSION:    Unchanged bilateral opacities and effusions.    VA Duplex Lower Ext Vein Scan, Bilat (01.02.25 @ 11:55)     IMPRESSION:  No evidence of deep venous thrombosis in either lower extremity.

## 2025-01-03 NOTE — PROGRESS NOTE ADULT - ASSESSMENT
79-year-old woman with PMHx of myelodysplastic syndrome (MDS) followed by Dr. Wade (requiring periodic transfusions for anemia), DM, HTN, CKD 2, and multiple recent admissions for a b/l subdural hematomas (SDH) with mass effect and midline shift (status-post evacuation and middle meningeal artery embolization complicated by a leaking craniotomy site and concern for status epilepticus) presented to the ED for anemia on 12/31. Patient was at [facility] center was found to be anemic to 5.6 being sent to ED for transfusion. She is nonverbal, bedbound, and has a PEG tube.    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   - Avoid nephrotoxins:  NSAIDS, IV contrast, Aminoglycosides, fleet enemas  - continue to monitor BMP qd  - dc'd Lokelma      #Chronic bilateral effusions   - pro-BNP 9727, significantly increased from 1439 in Oct 2024  - c/w PO 40 daily Lasix    #Fever due to possible UTI  #Stage 3 sacral ulcer  - Seen by infectious disease due to pyuria, though hard to differentiate if asymptomatic vs real infection -- transitioned to IV Zosyn 3.375 gram q 8 hours.   - 12/31 Urine Cx negative. Renal US without hydro.   - wound care eval for sacral ulcer  - f/u flu/rsv/covid swab    #Diarrhea-- resolved    #DM  - c/w insulin    #HO SDH (sp evacuation and middle meningeal artery embolization complicated by a leaking craniotomy site and concern for status epilepticus)  - c/w home antiepileptics    MISC  #DVT prophylaxis: Lovenox  #GI prophylaxis: PPI  #Diet: NPO with tube feeds  #Activity: IAT  #Code status: Full Code-- but hospice now following  #Disposition: DG to floor. 79-year-old woman with PMHx of myelodysplastic syndrome (MDS) followed by Dr. Wade (requiring periodic transfusions for anemia), DM, HTN, CKD 2, and multiple recent admissions for a b/l subdural hematomas (SDH) with mass effect and midline shift (status-post evacuation and middle meningeal artery embolization complicated by a leaking craniotomy site and concern for status epilepticus) presented to the ED for anemia on 12/31. Patient was at [facility] center was found to be anemic to 5.6 being sent to ED for transfusion. She is nonverbal, bedbound, and has a PEG tube.    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   - Avoid nephrotoxins:  NSAIDS, IV contrast, Aminoglycosides, fleet enemas  - continue to monitor BMP qd  - s/p Lokelma    #Chronic bilateral effusions   - pro-BNP 9727, significantly increased from 1439 in Oct 2024  - c/w PO 40 daily Lasix    #Fever due to possible UTI - resolved   #Stage 3 sacral ulcer  - Seen by infectious disease due to pyuria, though hard to differentiate if asymptomatic vs real infection -- transitioned to IV Zosyn 3.375 gram q 8 hours.   - 12/31 Urine Cx negative. Renal US without hydro.   - wound care eval for sacral ulcer  - f/u flu/rsv/covid swab    #Diarrhea- improving   - monitor electrolytes     #DM  - c/w insulin    #HO SDH   - sp evacuation and middle meningeal artery embolization complicated by a leaking craniotomy site and concern for status epilepticus  - c/w home antiepileptics    DVT ppx: Lovenox  GI ppx: PPI  Diet: NPO with tube feeds  Activity: IAT  Pending: palliative f/u for possible hospice , d/c in 24-48 hrs  79-year-old woman with PMHx of myelodysplastic syndrome (MDS) followed by Dr. Wade (requiring periodic transfusions for anemia), DM, HTN, CKD 2, and multiple recent admissions for a b/l subdural hematomas (SDH) with mass effect and midline shift (status-post evacuation and middle meningeal artery embolization complicated by a leaking craniotomy site and concern for status epilepticus) presented to the ED for anemia on 12/31. Patient was at [facility] center was found to be anemic to 5.6 being sent to ED for transfusion. She is nonverbal, bedbound, and has a PEG tube.    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   - Avoid nephrotoxins:  NSAIDS, IV contrast, Aminoglycosides, fleet enemas  - continue to monitor BMP qd  - s/p Lokelma    #Chronic bilateral effusions   - pro-BNP 9727, significantly increased from 1439 in Oct 2024  - c/w PO 40 daily Lasix    #Fever due to possible UTI - resolved   #Stage 3 sacral ulcer  - Seen by infectious disease due to pyuria, though hard to differentiate if asymptomatic vs real infection -- transitioned to IV Zosyn 3.375 gram q 8 hours.   - 12/31 Urine Cx negative. Renal US without hydro.   - wound care eval for sacral ulcer  - rvp neg     #Diarrhea- improving   - monitor electrolytes     #DM  - c/w insulin    #HO SDH   - sp evacuation and middle meningeal artery embolization complicated by a leaking craniotomy site and concern for status epilepticus  - c/w home antiepileptics    DVT ppx: Lovenox  GI ppx: PPI  Diet: NPO with tube feeds  Activity: IAT  Pending: palliative f/u for possible hospice , d/c in 24-48 hrs  79-year-old woman with PMHx of myelodysplastic syndrome (MDS) followed by Dr. Wade (requiring periodic transfusions for anemia), DM, HTN, CKD 2, and multiple recent admissions for a b/l subdural hematomas (SDH) with mass effect and midline shift (status-post evacuation and middle meningeal artery embolization complicated by a leaking craniotomy site and concern for status epilepticus) presented to the ED for anemia on 12/31. Patient was at [facility] center was found to be anemic to 5.6 being sent to ED for transfusion. She is nonverbal, bedbound, and has a PEG tube.    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   - Avoid nephrotoxins:  NSAIDS, IV contrast, Aminoglycosides, fleet enemas  - continue to monitor BMP qd  - s/p Lokelma    #Chronic bilateral effusions   - pro-BNP 9727, significantly increased from 1439 in Oct 2024  - c/w PO 40 daily Lasix    #Fever due to possible UTI - resolved   #Stage 3 sacral ulcer  - Seen by infectious disease due to pyuria, though hard to differentiate if asymptomatic vs real infection -- transitioned to IV Zosyn 3.375 gram q 8 hours.   - 12/31 Urine Cx negative. Renal US without hydro.   - wound care eval for sacral ulcer  - rvp neg     #Diarrhea- improving   - monitor electrolytes     #DM  - c/w insulin    #HO SDH   - sp evacuation and middle meningeal artery embolization complicated by a leaking craniotomy site and concern for status epilepticus  - c/w home antiepileptics    DVT ppx: Lovenox  GI ppx: PPI  Diet: NPO with tube feeds  Activity: IAT  Code: full for now (family is leaning toward hospice)   Pending: palliative f/u for possible hospice , d/c in 24-48 hrs

## 2025-01-03 NOTE — PROGRESS NOTE ADULT - ASSESSMENT
ASSESSMENT  This is a 79-year-old woman with PMHx of myelodysplastic syndrome (MDS) ,DM, HTN, CKD 2, and multiple recent admissions for a b/l subdural hematomas (SDH) with mass effect and midline shift (status-post evacuation and middle meningeal artery embolization complicated by a leaking craniotomy site and concern for status epilepticus) presented to the ED anemia.    IMPRESSION  #Anemia  #MDS, transfusion dependent- Revlimid and Vidaza, currently on Lusparacept, Last dose of Luspatercept documented was 10/1/34  #Fever  -  MRSA nares negative     #Pyruia- Hard to differentiate if asymptomatic vs real infection.   - Renal US 1/1 - no hydro   - Urine Cx 12/31 NG     #Patient Non-verbal and functional quadriplegic +Peg tube.   #Chronic respiratory failure. Bilateral pleural effusions  #Was managed for pneumonia and had thoracentesis 12/11/2024. Cultures negative   #Sacral Ulcer noted. 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    RECOMMENDATIONS  - onging fever - although no clear cause --  fever curve potentially improving   - noted GOC discussions -- possible consideration of hospice   - continue zosyn 3.375 mg q 8 hours for now; will follow rest of GOC discussion  - trend fever curve     Please call or message on [employer] Teams if with any questions.  Spectra [phone]

## 2025-01-03 NOTE — PROGRESS NOTE ADULT - SUBJECTIVE AND OBJECTIVE BOX
SIMONA KUHN  79y, Female  Allergy: No Known Allergies      LOS  3d    CHIEF COMPLAINT: sepsis (03 Jan 2025 08:02)      INTERVAL EVENTS/HPI  - No acute events overnight  - T(F): , Max: 100.6 (01-02-25 @ 16:00)  - remains febrile - non verbal   - WBC Count: 12.17 (01-03-25 @ 07:19)  WBC Count: 13.90 (01-02-25 @ 22:45)     - Creatinine: 1.1 (01-03-25 @ 07:19)  Creatinine: 1.1 (01-02-25 @ 05:19)       ROS  unable to obtain history secondary to patient's mental status and/or sedation      VITALS:  T(F): 98, Max: 100.6 (01-02-25 @ 16:00)  HR: 101  BP: 174/84  RR: 20Vital Signs Last 24 Hrs  T(C): 36.7 (03 Jan 2025 12:27), Max: 38.1 (02 Jan 2025 16:00)  T(F): 98 (03 Jan 2025 12:27), Max: 100.6 (02 Jan 2025 16:00)  HR: 101 (03 Jan 2025 12:27) (100 - 114)  BP: 174/84 (03 Jan 2025 12:27) (145/65 - 183/76)  BP(mean): 104 (02 Jan 2025 18:20) (104 - 111)  RR: 20 (03 Jan 2025 12:27) (19 - 42)  SpO2: 97% (03 Jan 2025 12:27) (87% - 100%)    Parameters below as of 02 Jan 2025 20:00  Patient On (Oxygen Delivery Method): nasal cannula        PHYSICAL EXAM:  Gen: NAD, resting in bed  HEENT: Normocephalic, atraumatic  Neck: supple, no lymphadenopathy  CV: Regular rate & regular rhythm  Lungs: decreased BS at bases, no fremitus  Abdomen: Soft, BS present  Ext: Warm, well perfused  Neuro: non focal, awake  Skin: no rash, no erythema  Lines: no phlebitis    FH: Non-contributory  Social Hx: Non-contributory    TESTS & MEASUREMENTS:                        8.9    12.17 )-----------( 383      ( 03 Jan 2025 07:19 )             27.9     01-03    146  |  110  |  60[H]  ----------------------------<  219[H]  4.0   |  24  |  1.1    Ca    8.6      03 Jan 2025 07:19  Mg     1.7     01-03    TPro  4.7[L]  /  Alb  3.0[L]  /  TBili  0.3  /  DBili  x   /  AST  24  /  ALT  17  /  AlkPhos  120[H]  01-03      LIVER FUNCTIONS - ( 03 Jan 2025 07:19 )  Alb: 3.0 g/dL / Pro: 4.7 g/dL / ALK PHOS: 120 U/L / ALT: 17 U/L / AST: 24 U/L / GGT: x           Urinalysis Basic - ( 03 Jan 2025 07:19 )    Color: x / Appearance: x / SG: x / pH: x  Gluc: 219 mg/dL / Ketone: x  / Bili: x / Urobili: x   Blood: x / Protein: x / Nitrite: x   Leuk Esterase: x / RBC: x / WBC x   Sq Epi: x / Non Sq Epi: x / Bacteria: x        Culture - Blood (collected 01-01-25 @ 07:53)  Source: .Blood BLOOD  Preliminary Report (01-02-25 @ 17:01):    No growth at 24 hours    Urinalysis with Rflx Culture (collected 12-31-24 @ 21:53)    Culture - Urine (collected 12-31-24 @ 21:53)  Source: Clean Catch None  Final Report (01-02-25 @ 10:16):    <10,000 CFU/mL Normal Urogenital Jacquelien    Culture - Fungal, Body Fluid (collected 12-11-24 @ 10:26)  Source: Pleural Fl  Preliminary Report (01-01-25 @ 23:01):    No fungus isolated at 3 weeks.    Culture - Body Fluid with Gram Stain (collected 12-11-24 @ 10:26)  Source: Pleural Fl  Gram Stain (12-11-24 @ 20:35):    polymorphonuclear leukocytes seen    No organisms seen    by cytocentrifuge  Final Report (12-16-24 @ 10:23):    No growth at 5 days    Culture - Urine (collected 12-10-24 @ 15:00)  Source: Catheterized None  Final Report (12-12-24 @ 13:43):    10,000 - 49,000 CFU/mL Candida albicans    "Susceptibilities not performed"    Urinalysis with Rflx Culture (collected 12-10-24 @ 15:00)    Culture - Blood (collected 12-10-24 @ 14:01)  Source: .Blood BLOOD  Final Report (12-15-24 @ 23:01):    No growth at 5 days    Culture - Blood (collected 12-10-24 @ 14:01)  Source: .Blood BLOOD  Final Report (12-15-24 @ 23:01):    No growth at 5 days        Lactate, Blood: 1.5 mmol/L (01-01-25 @ 13:06)  Lactate, Blood: 2.2 mmol/L (01-01-25 @ 04:24)  Lactate, Blood: 2.5 mmol/L (12-31-24 @ 22:54)  Blood Gas Venous - Lactate: 2.7 mmol/L (12-31-24 @ 21:30)      INFECTIOUS DISEASES TESTING  Procalcitonin: 0.65 (01-01-25 @ 07:54)  MRSA PCR Result.: Negative (01-01-25 @ 06:17)  MRSA PCR Result.: Negative (12-12-24 @ 11:46)  MRSA PCR Result.: Negative (11-02-24 @ 07:00)  Procalcitonin: 0.48 (10-16-24 @ 13:31)  Procalcitonin: 1.04 (10-13-24 @ 12:08)  MRSA PCR Result.: Positive (10-13-24 @ 12:08)  MRSA PCR Result.: NotDetec (10-09-24 @ 05:50)  Procalcitonin: 7.30 (10-09-24 @ 02:40)      INFLAMMATORY MARKERS      RADIOLOGY & ADDITIONAL TESTS:  I have personally reviewed the last available Chest xray  CXR  Xray Chest 1 View AP/PA:   ACC: [id number] EXAM:  XR CHEST 1 VIEW   ORDERED BY: ALEJANDRA BROWN     PROCEDURE DATE:  12/31/2024          INTERPRETATION:  Clinical History / Reason for exam: Shortness of breath    Comparison : Chest radiograph 12/11/2024.    Technique/Positioning:Single frontal chest x-ray obtained.    Findings:    Support devices: Unchanged right chest port.    Cardiac/mediastinum/hilum: Unchanged.    Lung parenchyma/Pleura: Increased bilateral pleural effusions, bilateral   opacities..    Skeleton/soft tissues: Unchanged.    Impression:    Increased bilateral pleural effusions, bilateral opacities.    --- End of Report ---            SILVER FOREMAN MD; Attending Radiologist  This document has been electronically signed. Jan 1 2025  2:49PM (12-31-24 @ 21:01)      CT      CARDIOLOGY TESTING  12 Lead ECG:   Ventricular Rate 106 BPM    Atrial Rate 106 BPM    P-R Interval 182 ms    QRS Duration 68 ms    Q-T Interval 302 ms    QTC Calculation(Bazett) 401 ms    P Axis 64 degrees    R Axis 11 degrees    T Axis 48 degrees    Diagnosis Line Sinus tachycardia  Otherwise normal ECG    Confirmed by Alexis Ho ([id number]) on 1/2/2025 12:34:46 PM (01-02-25 @ 07:21)  12 Lead ECG:   Ventricular Rate 88 BPM    Atrial Rate 88 BPM    P-R Interval 166 ms    QRS Duration 86 ms    Q-T Interval 362 ms    QTC Calculation(Bazett) 438 ms    P Axis 37 degrees    R Axis 17 degrees    T Axis 44 degrees    Diagnosis Line Normal sinus rhythm  Low voltage QRS  Cannot rule out Anterior infarct , age undetermined  Abnormal ECG    Confirmed by Alexis Ho ([id number]) on 1/2/2025 7:38:28 AM (01-01-25 @ 05:28)      MEDICATIONS  amantadine Syrup 50 Oral two times a day  chlorhexidine 2% Cloths 1 Topical daily  furosemide    Tablet 40 Oral daily  glucagon  Injectable 1 IntraMuscular once  heparin   Injectable 5000 SubCutaneous every 12 hours  insulin glargine Injectable (LANTUS) 5 SubCutaneous at bedtime  insulin lispro (ADMELOG) corrective regimen sliding scale  SubCutaneous three times a day before meals  insulin lispro Injectable (ADMELOG) 2 SubCutaneous three times a day before meals  lacosamide Solution 200 Oral two times a day  levETIRAcetam  Solution 1500 Oral two times a day  pantoprazole   Suspension 40 Oral daily  piperacillin/tazobactam IVPB.. 3.375 IV Intermittent every 8 hours      WEIGHT  Weight (kg): 65.9 (01-01-25 @ 00:00)  Creatinine: 1.1 mg/dL (01-03-25 @ 07:19)      ANTIBIOTICS:  piperacillin/tazobactam IVPB.. 3.375 Gram(s) IV Intermittent every 8 hours      All available historical records have been reviewed

## 2025-01-04 NOTE — PROGRESS NOTE ADULT - ASSESSMENT
79-year-old woman with PMHx of myelodysplastic syndrome (MDS) followed by Dr. Wade (requiring periodic transfusions for anemia), DM, HTN, CKD 2, and multiple recent admissions for a b/l subdural hematomas (SDH) with mass effect and midline shift (status-post evacuation and middle meningeal artery embolization complicated by a leaking craniotomy site and concern for status epilepticus) presented to the ED for anemia on 12/31. Patient was at [facility] center was found to be anemic to 5.6 being sent to ED for transfusion. She is nonverbal, bedbound, and has a PEG tube.    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   - Avoid nephrotoxins:  NSAIDS, IV contrast, Aminoglycosides, fleet enemas  - continue to monitor BMP qd  - s/p Lokelma    #Chronic bilateral effusions   - pro-BNP 9727, significantly increased from 1439 in Oct 2024  - c/w PO 40 daily Lasix    #Fever due to possible UTI - resolved   #Stage 3 sacral ulcer  - Seen by infectious disease due to pyuria, though hard to differentiate if asymptomatic vs real infection -- transitioned to IV Zosyn 3.375 gram q 8 hours.   - 12/31 Urine Cx negative. Renal US without hydro.   - wound care eval for sacral ulcer  - rvp neg     #Diarrhea- improving   - monitor electrolytes     #DM  - c/w insulin    #HO SDH   - sp evacuation and middle meningeal artery embolization complicated by a leaking craniotomy site and concern for status epilepticus  - c/w home antiepileptics    DVT ppx: Lovenox  GI ppx: PPI  Diet: NPO with tube feeds  Activity: IAT  Code: palliative on board, DNR/DNI, hospice conversation ongoing

## 2025-01-04 NOTE — PROGRESS NOTE ADULT - SUBJECTIVE AND OBJECTIVE BOX
SUBJECTIVE/OVERNIGHT EVENTS  This morning patient was seen and examined at bedside, resting comfortably in bed. No acute or major events overnight.    MEDICATIONS  STANDING MEDICATIONS  amantadine Syrup 50 milliGRAM(s) Oral two times a day  amLODIPine   Tablet 5 milliGRAM(s) Oral daily  chlorhexidine 2% Cloths 1 Application(s) Topical daily  furosemide    Tablet 40 milliGRAM(s) Oral daily  glucagon  Injectable 1 milliGRAM(s) IntraMuscular once  heparin   Injectable 5000 Unit(s) SubCutaneous every 12 hours  insulin glargine Injectable (LANTUS) 5 Unit(s) SubCutaneous at bedtime  insulin lispro (ADMELOG) corrective regimen sliding scale   SubCutaneous three times a day before meals  insulin lispro Injectable (ADMELOG) 2 Unit(s) SubCutaneous three times a day before meals  lacosamide Solution 200 milliGRAM(s) Oral two times a day  levETIRAcetam  Solution 1500 milliGRAM(s) Oral two times a day  pantoprazole   Suspension 40 milliGRAM(s) Oral daily  piperacillin/tazobactam IVPB.. 3.375 Gram(s) IV Intermittent every 8 hours    PRN MEDICATIONS    VITALS  T(F): 98.4 (01-03-25 @ 05:24), Max: 100.6 (01-02-25 @ 16:00)  HR: 100 (01-03-25 @ 05:24) (100 - 114)  BP: 168/81 (01-03-25 @ 05:24) (145/65 - 183/76)  RR: 19 (01-03-25 @ 05:24) (19 - 42)  SpO2: 99% (01-03-25 @ 05:24) (87% - 100%)  POCT Blood Glucose.: 218 mg/dL (01-03-25 @ 07:41)  POCT Blood Glucose.: 98 mg/dL (01-02-25 @ 20:50)  POCT Blood Glucose.: 139 mg/dL (01-02-25 @ 16:35)  POCT Blood Glucose.: 143 mg/dL (01-02-25 @ 11:07)    PHYSICAL EXAM  GENERAL  ( + ) NAD, lying in bed comfortably     (  ) obtunded     (  ) lethargic     (  ) somnolent    HEAD  (+  ) Atraumatic     (  ) hematoma     (  ) laceration (specify location:       )     NECK  (  +) Supple     (  ) neck stiffness     (  ) nuchal rigidity     (  )  no JVD     (  ) JVD present ( -- cm)    HEART  Rate -->  (  ) normal rate    (  ) bradycardic    ( + ) tachycardic  Rhythm -->  (  ) regular    (  ) regularly irregular    (  ) irregularly irregular  Murmurs -->  (  ) normal s1/s2    (  ) systolic murmur    (  ) diastolic murmur    (  ) continuous murmur     (  ) S3 present    (  ) S4 present    LUNGS  ( + )Unlabored respirations     (  ) tachypnea  ( + ) B/L air entry     (  ) decreased breath sounds in:  (location     )    (  ) no adventitious sound     (  ) crackles     (  ) wheezing      (  ) rhonchi      (specify location:       )  (  ) chest wall tenderness (specify location:       )    ABDOMEN  ( + ) Soft     (  ) tense   |   (  ) nondistended     (  ) distended   |   (  ) +BS     (  ) hypoactive bowel sounds     (  ) hyperactive bowel sounds  (  ) nontender     (  ) RUQ tenderness     (  ) RLQ tenderness     (  ) LLQ tenderness     (  ) epigastric tenderness     (  ) diffuse tenderness  (  ) Splenomegaly      (  ) Hepatomegaly      (  ) Jaundice     (  ) ecchymosis     EXTREMITIES  ( + ) U/L E edema (  ) Rash     (  ) ecchymosis     (  ) varicose veins      (  ) pitting edema     (  ) non-pitting edema   (  ) ulceration     (  ) gangrene:     (location:     )    NERVOUS SYSTEM  (  +) A&Ox0     (  ) confused     (  ) lethargic  CN II-XII:     (  ) Intact     (  ) focal deficits  (Specify:     )   Upper extremities:     (  ) strength X/5     (  ) focal deficit (specify:    )  Lower extremities:     (  ) strength  X/5    (  ) focal deficit (specify:    )    SKIN  ( + ) No rashes or lesions     (  ) maculopapular rash     (  ) pustules     (  ) vesicles     (  ) ulcer     (  ) ecchymosis     (specify location:     )    LABS             8.9    13.90 )-----------( 388      ( 01-02-25 @ 22:45 )             27.4     143  |  109  |  61  -------------------------<  249   01-02-25 @ 05:19  4.6  |  23  |  1.1    Ca      9.0     01-02-25 @ 05:19  Mg     1.7     01-02-25 @ 05:19    Pro-Brain Natriuretic Peptide: 9727 pg/mL (01-01-25 @ 13:06)    Urinalysis Basic - ( 02 Jan 2025 05:19 )    Color: x / Appearance: x / SG: x / pH: x  Gluc: 249 mg/dL / Ketone: x  / Bili: x / Urobili: x   Blood: x / Protein: x / Nitrite: x   Leuk Esterase: x / RBC: x / WBC x   Sq Epi: x / Non Sq Epi: x / Bacteria: x    Culture - Blood (collected 01 Jan 2025 07:53)  Source: .Blood BLOOD  Preliminary Report (02 Jan 2025 17:01):    No growth at 24 hours    Urinalysis with Rflx Culture (collected 31 Dec 2024 21:53)    Culture - Urine (collected 31 Dec 2024 21:53)  Source: Clean Catch None  Final Report (02 Jan 2025 10:16):    <10,000 CFU/mL Normal Urogenital Jacqueline    IMAGING   Xray Chest (01.03.25 @ 06:22)     IMPRESSION:    Unchanged bilateral opacities and effusions.    VA Duplex Lower Ext Vein Scan, Bilat (01.02.25 @ 11:55)     IMPRESSION:  No evidence of deep venous thrombosis in either lower extremity.

## 2025-01-05 NOTE — PROGRESS NOTE ADULT - SUBJECTIVE AND OBJECTIVE BOX
SUBJECTIVE/OVERNIGHT EVENTS  Today is hospital day 5d. This morning patient was seen and examined at bedside, resting comfortably in bed. No acute or major events overnight.    MEDICATIONS  STANDING MEDICATIONS  amantadine Syrup 50 milliGRAM(s) Oral two times a day  amLODIPine   Tablet 10 milliGRAM(s) Oral daily  chlorhexidine 2% Cloths 1 Application(s) Topical daily  dextrose 5%. 1000 milliLiter(s) IV Continuous <Continuous>  dextrose 5%. 1000 milliLiter(s) IV Continuous <Continuous>  furosemide    Tablet 40 milliGRAM(s) Oral daily  glucagon  Injectable 1 milliGRAM(s) IntraMuscular once  heparin   Injectable 5000 Unit(s) SubCutaneous every 12 hours  insulin glargine Injectable (LANTUS) 5 Unit(s) SubCutaneous at bedtime  insulin lispro (ADMELOG) corrective regimen sliding scale   SubCutaneous three times a day before meals  insulin lispro Injectable (ADMELOG) 2 Unit(s) SubCutaneous three times a day before meals  lacosamide Solution 200 milliGRAM(s) Oral two times a day  levETIRAcetam  Solution 1500 milliGRAM(s) Oral two times a day  pantoprazole   Suspension 40 milliGRAM(s) Oral daily  piperacillin/tazobactam IVPB.. 3.375 Gram(s) IV Intermittent every 8 hours    PRN MEDICATIONS    VITALS  T(F): 99.8 (01-05-25 @ 04:48), Max: 99.8 (01-05-25 @ 04:48)  HR: 100 (01-05-25 @ 04:48) (100 - 106)  BP: 177/79 (01-05-25 @ 04:48) (161/71 - 177/79)  RR: 18 (01-05-25 @ 04:48) (18 - 18)  SpO2: 96% (01-05-25 @ 07:47) (95% - 99%)  POCT Blood Glucose.: 220 mg/dL (01-04-25 @ 20:46)  POCT Blood Glucose.: 191 mg/dL (01-04-25 @ 16:50)  POCT Blood Glucose.: 175 mg/dL (01-04-25 @ 11:16)    PHYSICAL EXAM  GENERAL  ( + ) NAD, lying in bed comfortably     (  ) obtunded     (  ) lethargic     (  ) somnolent    HEAD  ( + ) Atraumatic     (  ) hematoma     (  ) laceration (specify location:       )     NECK  ( + ) Supple     (  ) neck stiffness     (  ) nuchal rigidity     (  )  no JVD     (  ) JVD present ( -- cm)    HEART  Rate -->  ( + ) normal rate    (  ) bradycardic    (  ) tachycardic  Rhythm -->  (+  ) regular    (  ) regularly irregular    (  ) irregularly irregular  Murmurs -->  (  ) normal s1/s2    (  ) systolic murmur    (  ) diastolic murmur    (  ) continuous murmur     (  ) S3 present    (  ) S4 present    LUNGS  ( + )Unlabored respirations     (  ) tachypnea  (  +) B/L air entry     (  ) decreased breath sounds in:  (location     )    (  ) no adventitious sound     (  ) crackles     (  ) wheezing      (  ) rhonchi      (specify location:       )  (  ) chest wall tenderness (specify location:       )    ABDOMEN  ( + ) Soft     (  ) tense   |   (  ) nondistended     (  ) distended   |   (  ) +BS     (  ) hypoactive bowel sounds     (  ) hyperactive bowel sounds  (  ) nontender     (  ) RUQ tenderness     (  ) RLQ tenderness     (  ) LLQ tenderness     (  ) epigastric tenderness     (  ) diffuse tenderness  (  ) Splenomegaly      (  ) Hepatomegaly      (  ) Jaundice     (  ) ecchymosis     EXTREMITIES  ( + ) b/l edema (  ) Rash     (  ) ecchymosis     (  ) varicose veins      (  ) pitting edema     (  ) non-pitting edema   (  ) ulceration     (  ) gangrene:     (location:     )    NERVOUS SYSTEM  ( + ) A&Ox0     (  ) confused     (  ) lethargic  CN II-XII:     (  ) Intact     (  ) focal deficits  (Specify:     )   Upper extremities:     (  ) strength X/5     (  ) focal deficit (specify:    )  Lower extremities:     (  ) strength  X/5    (  ) focal deficit (specify:    )    SKIN  ( + ) No rashes or lesions     (  ) maculopapular rash     (  ) pustules     (  ) vesicles     (  ) ulcer     (  ) ecchymosis     (specify location:     )    LABS             9.1    13.49 )-----------( 412      ( 01-04-25 @ 22:34 )             29.0     150  |  114  |  50  -------------------------<  191   01-04-25 @ 22:34  3.2  |  26  |  1.0    Ca      9.4     01-04-25 @ 22:34  Mg     1.7     01-04-25 @ 07:48    TPro  4.5  /  Alb  3.0  /  TBili  0.3  /  DBili  x   /  AST  26  /  ALT  17  /  AlkPhos  115  /  GGT  x     01-04-25 @ 07:48    Urinalysis Basic - ( 04 Jan 2025 22:34 )    Color: x / Appearance: x / SG: x / pH: x  Gluc: 191 mg/dL / Ketone: x  / Bili: x / Urobili: x   Blood: x / Protein: x / Nitrite: x   Leuk Esterase: x / RBC: x / WBC x   Sq Epi: x / Non Sq Epi: x / Bacteria: x

## 2025-01-05 NOTE — PROGRESS NOTE ADULT - ASSESSMENT
79-year-old woman with PMHx of myelodysplastic syndrome (MDS) followed by Dr. Wade (requiring periodic transfusions for anemia), DM, HTN, CKD 2, and multiple recent admissions for a b/l subdural hematomas (SDH) with mass effect and midline shift (status-post evacuation and middle meningeal artery embolization complicated by a leaking craniotomy site and concern for status epilepticus) presented to the ED for anemia on 12/31. Patient was at [facility] center was found to be anemic to 5.6 being sent to ED for transfusion. She is nonverbal, bedbound, and has a PEG tube.    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   - Avoid nephrotoxins:  NSAIDS, IV contrast, Aminoglycosides, fleet enemas  - continue to monitor BMP qd  - s/p Lokelma    #Chronic bilateral effusions   - pro-BNP 9727, significantly increased from 1439 in Oct 2024  - c/w PO 40 daily Lasix    #Fever due to possible UTI - resolved   #Stage 3 sacral ulcer  - Seen by infectious disease due to pyuria, though hard to differentiate if asymptomatic vs real infection -- transitioned to IV Zosyn 3.375 gram q 8 hours.   - 12/31 Urine Cx negative. Renal US without hydro.   - wound care eval for sacral ulcer  - rvp neg     #Diarrhea- improving   - monitor electrolytes     #DM  - c/w insulin    #HO SDH   - sp evacuation and middle meningeal artery embolization complicated by a leaking craniotomy site and concern for status epilepticus  - c/w home antiepileptics    DVT ppx: Lovenox  GI ppx: PPI  Diet: NPO with tube feeds  Activity: IAT  Code: DNR/DNI  Pending: hospice placement  79-year-old woman with PMHx of myelodysplastic syndrome (MDS) followed by Dr. Wade (requiring periodic transfusions for anemia), DM, HTN, CKD 2, and multiple recent admissions for a b/l subdural hematomas (SDH) with mass effect and midline shift (status-post evacuation and middle meningeal artery embolization complicated by a leaking craniotomy site and concern for status epilepticus) presented to the ED for anemia on 12/31. Patient was at [facility] center was found to be anemic to 5.6 being sent to ED for transfusion. She is nonverbal, bedbound, and has a PEG tube.    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   - Avoid nephrotoxins:  NSAIDS, IV contrast, Aminoglycosides, fleet enemas  - continue to monitor BMP qd  - s/p Lokelma    hypernatremia   - Na 150 >150   - c/w dextrose   - f/u repeat BMP     #Chronic bilateral effusions   - pro-BNP 9727, significantly increased from 1439 in Oct 2024  - c/w PO 40 daily Lasix    #Fever due to possible UTI - resolved   #Stage 3 sacral ulcer  - Seen by infectious disease due to pyuria, though hard to differentiate if asymptomatic vs real infection -- transitioned to IV Zosyn 3.375 gram q 8 hours.   - 12/31 Urine Cx negative. Renal US without hydro.   - wound care eval for sacral ulcer  - rvp neg     #Diarrhea- improving   - monitor electrolytes     #DM  - c/w insulin    #HO SDH   - sp evacuation and middle meningeal artery embolization complicated by a leaking craniotomy site and concern for status epilepticus  - c/w home antiepileptics    DVT ppx: Lovenox  GI ppx: PPI  Diet: NPO with tube feeds  Activity: IAT  Code: DNR/DNI  Pending: hospice placement

## 2025-01-06 NOTE — PROGRESS NOTE ADULT - ASSESSMENT
79F with PMH of MDS, DM, HTN, CKD2, stage 3 sacral ulcer, recent SDHs (s/p MME and evacuation) here with [facility] at [facility]. Patient is nonverbal, bedbound, and with a PEG tube. s/p pRBC transfusion. Also on PO lasix, and on IV zosyn for possible infection. Seen by palliative care in the past, and daughter wanted full medical measures. Palliative consulted for GOC.    - c/w IV abx, AEDs, tube feeds for now  - c/w IV lasix, monitor I/O, hemodynamics  - d/w daughter, she is aware of plans for hospice on d/c, and wants to continue all medical measures here until her discharge  - DNR/DNI  - will sign off, reconsult PRN    ______________  Cy Morales MD  Palliative Medicine  [facility]  [phone]

## 2025-01-06 NOTE — PROGRESS NOTE ADULT - SUBJECTIVE AND OBJECTIVE BOX
SIMONA KUHN  79y  Female  ***My note supersedes ALL resident notes that I sign.  My corrections for their notes are in my note.***    I can be reached directly via Teams or my office number is [phone] or [phone].    INTERVAL EVENTS: Here for f/u of anemia. Pt remains the same - not awake or alert; not verbal or interactive. Pt on PEG feeds.    T(F): 98.3 (01-06-25 @ 04:45), Max: 98.5 (01-05-25 @ 19:52)  HR: 108 (01-06-25 @ 04:45) (108 - 116)  BP: 168/64 (01-06-25 @ 04:45) (152/68 - 182/70)  RR: 18 (01-06-25 @ 04:45) (18 - 19)  SpO2: 99% (01-06-25 @ 04:45) (96% - 99%)    01-05-25 @ 07:01  -  01-06-25 @ 07:00  --------------------------------------------------------  IN: 0 mL / OUT: 450 mL / NET: -450 mL    Gen: no resp distress; non-verb; not interactive  HEENT: PERRL, mouth clr, nose clr; + NC O2 (1 L/min)  Neck: no nodes, no JVD, thyroid nl  chest: rt port-a-cath  lungs: decr BS bases  hrt: s1 s2 reg, tachy, no murmur  abd: soft, NT/ND, no HS megaly; PEG - site w/ mild erythema; no d/c  ext: 1+ edema, no c/c  neuro: not awake or alert; legs are a bit contracted  : + ocampo: yel urine; no bolld  Anal: + Dign[place]ield w/ brn soft stool - no blood    LABS:                      8.3     (    91.4   15.81 )-----------( ---------      406      ( 06 Jan 2025 05:41 )             26.7    (    17.8     WBC Count: 15.81 K/uL (01-06-25 @ 05:41) - worse  WBC Count: 12.90 K/uL (01-05-25 @ 07:44)  WBC Count: 13.49 K/uL (01-04-25 @ 22:34)  WBC Count: 10.58 K/uL (01-04-25 @ 07:48)  WBC Count: 12.17 K/uL (01-03-25 @ 07:19)  WBC Count: 13.90 K/uL (01-02-25 @ 22:45)  WBC Count: 9.71 K/uL (01-02-25 @ 05:19)  WBC Count: 10.50 K/uL (01-01-25 @ 18:06)    148   (   112   (   249      01-06-25 @ 05:41  ----------------------               4.0   (   23   (   48                             -----                        0.9  Ca  8.9   Mg  1.9    P   --     Sodium: 148 mmol/L (01-06-25 @ 05:41) = corrected for gluc = 150.4  Sodium: 149 mmol/L (01-05-25 @ 17:01)  Sodium: 150 mmol/L (01-05-25 @ 07:44)  Sodium: 150 mmol/L (01-04-25 @ 22:34)  Sodium: 150 mmol/L (01-04-25 @ 07:48)  Sodium: 146 mmol/L (01-03-25 @ 07:19)  Sodium: 143 mmol/L (01-02-25 @ 05:19)  Sodium: 145 mmol/L (01-01-25 @ 18:06)    149   (   113   (   98      01-05-25 @ 17:01  ----------------------               4.2   (   24   (   46                             -----                        0.9  Ca  9.1   Mg  --    P   --     LFT  4.9  (  0.3  (  56       01-06-25 @ 05:41  -------------------------  3.1  (  165  (  36    LFT  5.0  (  0.3  (  30       01-05-25 @ 17:01  -------------------------  3.1  (  130  (  23    Urinalysis Basic - ( 06 Jan 2025 05:41 )    Color: x / Appearance: x / SG: x / pH: x  Gluc: 249 mg/dL / Ketone: x  / Bili: x / Urobili: x   Blood: x / Protein: x / Nitrite: x   Leuk Esterase: x / RBC: x / WBC x   Sq Epi: x / Non Sq Epi: x / Bacteria: x    CAPILLARY BLOOD GLUCOSE  POCT Blood Glucose.: 157 (01-06-25 @ 11:46)  POCT Blood Glucose.: 300 (01-06-25 @ 07:32)  POCT Blood Glucose.: 249 (01-05-25 @ 20:56)  POCT Blood Glucose.: 75 (01-05-25 @ 16:35)  POCT Blood Glucose.: 209 (01-05-25 @ 11:31)  POCT Blood Glucose.: 281 (01-05-25 @ 08:05)  POCT Blood Glucose.: 220 (01-04-25 @ 20:46)  POCT Blood Glucose.: 191 (01-04-25 @ 16:50)    Culture - Blood (collected 01-01-25 @ 07:53)  Source: .Blood BLOOD  Preliminary Report (01-05-25 @ 17:00):    No growth at 4 days    Culture - Urine (collected 12-31-24 @ 21:53)  Source: Clean Catch None  Final Report (01-02-25 @ 10:16):    <10,000 CFU/mL Normal Urogenital Jacqueline    RADIOLOGY & ADDITIONAL TESTS:  < from: Xray Chest 1 View- PORTABLE-Routine (Xray Chest 1 View- PORTABLE-Routine in AM.) (01.03.25 @ 06:22) >  SUPPORT DEVICES: Port-A-Cath overlies the right hemithorax with distal tip overlying the SVC.    CARDIAC/MEDIASTINUM/HILUM: Unchanged cardiac silhouette.    LUNG PARENCHYMA/PLEURA: Unchanged bilateral opacities and effusions.    SKELETON/SOFT TISSUES: Unchanged.    IMPRESSION:    Unchanged bilateral opacities and effusions.    < end of copied text >    < from: VA Duplex Lower Ext Vein Scan, Bilat (01.02.25 @ 11:55) >  IMPRESSION:  No evidence of deep venous thrombosis in either lower extremity.    < end of copied text >    < from: US Kidney and Bladder (01.02.25 @ 03:14) >  No hydronephrosis or solid renal mass bilaterally.    < end of copied text >  < from: TTE Echo Complete w/o Contrast w/ Doppler (10.09.24 @ 12:41) >   1. Technically limited study.   2. Normal global left ventricular systolic function.   3. Left ventricular ejection fraction, by visual estimation, is 65 to 70%.   4. The left ventricular diastolic function could not be assessed in this study.   5. Normal right ventricular size and function.   6. Mildly enlarged left atrium.   7. Sclerotic aortic valve with normal opening.   8. Mild tricuspid regurgitation.   9. Normal pulmonary artery pressure.    < end of copied text >    MEDICATIONS:  piperacillin/tazobactam IVPB.. 3.375 Gram(s) IV Intermittent every 8 hours    amantadine Syrup 50 milliGRAM(s) Oral two times a day  amLODIPine   Tablet 10 milliGRAM(s) Oral daily  chlorhexidine 2% Cloths 1 Application(s) Topical daily  furosemide    Tablet 40 milliGRAM(s) Oral daily  furosemide   Injectable 40 milliGRAM(s) IV Push two times a day  heparin   Injectable 5000 Unit(s) SubCutaneous every 12 hours  insulin glargine Injectable (LANTUS) 8 Unit(s) SubCutaneous at bedtime  insulin lispro (ADMELOG) corrective regimen sliding scale   SubCutaneous three times a day before meals  insulin lispro Injectable (ADMELOG) 3 Unit(s) SubCutaneous four times a day before meals  lacosamide Solution 200 milliGRAM(s) Oral two times a day  levETIRAcetam  Solution 1500 milliGRAM(s) Oral two times a day  pantoprazole   Suspension 40 milliGRAM(s) Oral daily

## 2025-01-06 NOTE — ADVANCED PRACTICE NURSE CONSULT - ASSESSMENT
History of Present Illness:   Case of 79-year-old woman with PMHx of myelodysplastic syndrome (MDS) followed by Dr. Wade (requiring periodic transfusions for anemia), DM, HTN, CKD 2, and multiple recent admissions for a b/l subdural hematomas (SDH) with mass effect and midline shift (status-post evacuation and middle meningeal artery embolization complicated by a leaking craniotomy site and concern for status epilepticus) presented to the ED anemia. Patient was at [facility] was found to be anemic to 5.6 being sent to ED for transfusion. She is nonverbal, bedbound, and has a PEG tube. I am unable to obtain a comprehensive history, review of systems, past medical history, and/or physical exam due to constraints imposed by the patient's clinical condition and/or mental status. Upon arrival to unit, patient noted to have diarrhea, non bloody. Also noted to be on loperamide PRN. In the ED, she was grossly stable. Later developed a fever of 100.5        Patient received lying in bed. Lethargic. Limited mobility. Incontinent of stool. Small in place. High risk for pressure injury development and progression.    Type of Wound: Unstagebale Pressure Injury  Location: Sacrococcygeal region (present on admission, documented as sacrum stage 3, inappropriate documentation)  Measurements: ~2ymy6ic  Tunneling/ Undermining: No  Wound bed: Yellow adherent tissue  Wound edges: Irregular, purple  Periwound: Intact  Wound exudate: None  Wound odor: No  Induration, erythema, warmth: No  Wound pain: No    Skin to bilateral heels intact at time of assessment.

## 2025-01-06 NOTE — PROGRESS NOTE ADULT - SUBJECTIVE AND OBJECTIVE BOX
HPI: 79F with PMH of MDS, DM, HTN, CKD2, stage 3 sacral ulcer, recent SDHs (s/p MME and evacuation) here with [facility] at [facility]. Patient is nonverbal, bedbound, and with a PEG tube. s/p pRBC transfusion. Also on PO lasix, and on IV zosyn for possible infection. Seen by palliative care in the past, and daughter wanted full medical measures. Palliative reconsulted for GOC.    INTERVAL EVENTS  1/3/25: patient comfortable appearing  1/6/25: patient comfortable    ADVANCE DIRECTIVES:    [X ] Full Code [ ] DNR  MOLST  [ ]  Living Will  [ ]   DECISION MAKER(s):  [ ] Health Care Proxy(s)  [ ] Surrogate(s)  [ ] Guardian           Name(s): Phone Number(s): tree Garcia [phone]    BASELINE (I)ADL(s) (prior to admission):  [place]: [ ]Total  [ ] Moderate [ ]Dependent  Palliative Performance Status Version 2:         %    http://npcrc.org/files/news/palliative_performance_scale_ppsv2.pdf    Allergies    No Known Allergies    Intolerances    MEDICATIONS  (STANDING):  amantadine Syrup 50 milliGRAM(s) Oral two times a day  amLODIPine   Tablet 10 milliGRAM(s) Oral daily  chlorhexidine 2% Cloths 1 Application(s) Topical daily  dextrose 5%. 1000 milliLiter(s) (75 mL/Hr) IV Continuous <Continuous>  furosemide   Injectable 40 milliGRAM(s) IV Push two times a day  glucagon  Injectable 1 milliGRAM(s) IntraMuscular once  heparin   Injectable 5000 Unit(s) SubCutaneous every 12 hours  insulin glargine Injectable (LANTUS) 8 Unit(s) SubCutaneous at bedtime  insulin lispro (ADMELOG) corrective regimen sliding scale   SubCutaneous three times a day before meals  insulin lispro Injectable (ADMELOG) 3 Unit(s) SubCutaneous four times a day before meals  lacosamide Solution 200 milliGRAM(s) Oral two times a day  levETIRAcetam  Solution 1500 milliGRAM(s) Oral two times a day  loperamide Liquid 2 milliGRAM(s) Oral daily  pantoprazole   Suspension 40 milliGRAM(s) Oral daily  piperacillin/tazobactam IVPB.. 3.375 Gram(s) IV Intermittent every 8 hours    MEDICATIONS  (PRN):        PRESENT SYMPTOMS: [X ]Unable to obtain due to poor mentation   Source if other than patient:  [ ]Family   [ ]Team   [ ]All review of systems negative including pain and dyspnea unless noted below    All components of pain assessment below addressed. Blank spaces indicate that the patient did/could not complete the assessment.  Pain: [ ]yes [ ]no  QOL impact -   Location -                    Aggravating factors -  Quality -  Radiation -  Timing-  Severity (0-10 scale):  Minimal acceptable level (0-10 scale):     CPOT:    https://www.[email or web address]/getattachment/[email or web address]/Critical-Care-Pain-Observation-Tool-(CPOT)    PAIN AD Score: 0  http://geriatrictoolkit.[facility]/cog/painad.pdf (press ctrl +  left click to view)    Dyspnea:                           [ ]None[ ]Mild [ ]Moderate [ ]Severe     Respiratory Distress Observation Scale (RDOS): 1  A score of 0 to 2 signifies little or no respiratory distress, 3 signifies mild distress, scores 4 to 6 indicate moderate distress, and scores greater than 7 signify severe distress  https://[email or web address].ca/sites/default/files/PDFS/806723-nvwegywaosu-brsulaal-khtnymudrmh-ixakt.pdf    Anxiety:                             [ ]None[ ]Mild [ ]Moderate [ ]Severe   Fatigue:                             [ ]None[ ]Mild [ ]Moderate [ ]Severe   Nausea:                             [ ]None[ ]Mild [ ]Moderate [ ]Severe   Loss of appetite:              [ ]None[ ]Mild [ ]Moderate [ ]Severe   Constipation:                    [ ]None[ ]Mild [ ]Moderate [ ]Severe    Other Symptoms:      Palliative Performance Status Version 2:         %    http://npcrc.org/files/news/palliative_performance_scale_ppsv2.pdf  PHYSICAL EXAM:  Vital Signs Last 24 Hrs  T(C): 37.2 (06 Jan 2025 13:57), Max: 37.2 (06 Jan 2025 13:57)  T(F): 99 (06 Jan 2025 13:57), Max: 99 (06 Jan 2025 13:57)  HR: 114 (06 Jan 2025 13:57) (108 - 116)  BP: 168/72 (06 Jan 2025 13:57) (152/68 - 182/70)  BP(mean): 104 (06 Jan 2025 13:57) (100 - 104)  RR: 18 (06 Jan 2025 13:57) (18 - 19)  SpO2: 99% (06 Jan 2025 04:45) (96% - 99%)    Parameters below as of 05 Jan 2025 22:00  Patient On (Oxygen Delivery Method): nasal cannula  O2 Flow (L/min): 2    GENERAL:  [X ] No acute distress [ ]Lethargic  [ ]Unarousable  [ ]Verbal  [ ]Non-Verbal [ ]Cachexia    BEHAVIORAL/PSYCH:  [ ]Alert and Oriented x  [ ] Anxiety [ ] Delirium [ ] Agitation [X ] Calm   EYES: [ ] No scleral icterus [ ] Scleral icterus [ X] Closed  ENMT:  [ ]Dry mouth  [ ]No external oral lesions [ X] No external ear or nose lesions  CARDIOVASCULAR:  [ ]Regular [ ]Irregular [ ]Tachy [ ]Not Tachy  [ ][place] [ ] Edema [ ] No edema  PULMONARY:  [ ]Tachypnea  [ ]Audible excessive secretions [X ] No labored breathing [ ] labored breathing  GASTROINTESTINAL: [ ]Soft  [ ]Distended  [ X]Not distended [ ]Non tender [ ]Tender  MUSCULOSKELETAL: [ ]No clubbing [ ] clubbing  [ X] No cyanosis [ ] cyanosis  NEUROLOGIC: [ ]No focal deficits  [ ]Follows commands  [ ]Does not follow commands  [X ]Cognitive impairment  [ ]Dysphagia  [ ]Dysarthria  [ ]Paresis   SKIN: [ ] Jaundiced [X ] Non-jaundiced [ ]Rash [ ]No Rash [ ] Warm [ ] Dry  MISC/LINES: [ ] ET tube [ ] Trach [ ]NGT/OGT [ ]PEG [ ]Small    CRITICAL CARE:  [ ] Shock Present  [ ]Septic [ ]Cardiogenic [ ]Neurologic [ ]Hypovolemic  [ ]  Vasopressors [ ]  Inotropes   [ ]Respiratory failure present [ ]Mechanical ventilation [ ]Non-invasive ventilatory support [ ]High flow  [ ]Acute  [ ]Chronic [ ]Hypoxic  [ ]Hypercarbic [ ]Other  [ ]Other organ failure     LABS: reviewed by me, notable for: elevated WBC                                   8.3    15.81 )-----------( 406      ( 06 Jan 2025 05:41 )             26.7     01-06    148[H]  |  112[H]  |  48[H]  ----------------------------<  249[H]  4.0   |  23  |  0.9    Ca    8.9      06 Jan 2025 05:41  Mg     1.9     01-06          RADIOLOGY & ADDITIONAL STUDIES: CXR personally reviewed by me: bilateral effusions    PROTEIN CALORIE MALNUTRITION PRESENT: [ ]mild [ ]moderate [ ]severe [ ]underweight [ ]morbid obesity  https://www.andeal.org/vault/2440/web/files/ONC/Table_Clinical%20Characteristics%20to%20Document%20Malnutrition-White%20JV%20et%20al%202012.pdf    Height (cm): 154.9 (01-01-25 @ 00:00), 162.6 (12-10-24 @ 11:19), 162.6 (11-12-24 @ 08:42)  Weight (kg): 65.9 (01-01-25 @ 00:00), 31.706 (12-12-24 @ 04:22), 66.4 (11-12-24 @ 08:42)  BMI (kg/m2): 27.5 (01-01-25 @ 00:00), 12 (12-12-24 @ 04:22), 25.1 (12-10-24 @ 11:19)    [ ]PPSV2 < or = to 30% [ ]significant weight loss  [ ]poor nutritional intake  [ ]anasarca      [ ]Artificial Nutrition          Palliative Care Spiritual/Emotional Screening Tool Question  Severity (0-4):                    OR                    [X ] Unable to determine/NA  Score of 2 or greater indicates recommendation of Chaplaincy referral  Chaplaincy Referral: [ ] Yes [ ] Refused [ ] Following     Caregiver Alsen:  [ ] Yes [ ] No    OR    [x ] Unable to determine. Will assess at later time if appropriate.  Social Work Referral [ ]  Patient and Family Centered Care Referral [ ]    Anticipatory Grief Present: [ ] Yes [ ] No    OR     [ x] Unable to determine. Will assess at later time if appropriate.  Social Work Referral [ ]  Patient and Family Centered Care Referral [ ]    REFERRALS:   [ ]Chaplaincy  [ ]Hospice  [ ]Child Life  [ ]Social Work  [ ]Case management [ ]Holistic Therapy     Palliative care education provided to patient and/or family    Goals of Care Document:     ______________  Cy Morales MD  Palliative Medicine  [facility]   of Geriatric and Palliative Medicine  [phone]

## 2025-01-06 NOTE — HOSPICE CARE NOTE - CONVESATION DETAILS
Spoke with patient's daughter Jose who is receptive to hospice at a SNF. Daughter does not want her mom going back to [facility] [place]. PELON Meade aware and will send out JULIENNE to other nursing homes.

## 2025-01-06 NOTE — PROGRESS NOTE ADULT - ASSESSMENT
Prescription approved per [facility] [place] [facility].    Joyce Marti RN    ASSESSMENT  This is a 79-year-old woman with PMHx of myelodysplastic syndrome (MDS) ,DM, HTN, CKD 2, and multiple recent admissions for a b/l subdural hematomas (SDH) with mass effect and midline shift (status-post evacuation and middle meningeal artery embolization complicated by a leaking craniotomy site and concern for status epilepticus) presented to the ED anemia.    IMPRESSION  #Anemia  #MDS, transfusion dependent- Revlimid and Vidaza, currently on Lusparacept, Last dose of Luspatercept documented was 10/1/34    #Fever - Pneumonia?  -  MRSA nares negative   - CXR with bilateral pleural effusions     #Pyruia- Hard to differentiate if asymptomatic vs real infection.   - Renal US 1/1 - no hydro   - Urine Cx 12/31 NG     #Patient Non-verbal and functional quadriplegic +Peg tube.   #Chronic respiratory failure. Bilateral pleural effusions  #Was managed for pneumonia and had thoracentesis 12/11/2024. Cultures negative   #Sacral Ulcer noted. 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    RECOMMENDATIONS  - fevers improved, but with leukocytosis    - has been on zosyn since 1/1 -- if WBC worsening tomorrow, will consider CT Abd/pelvis; previous CT Chest with in 12/12 with bilateral pleural effusions which are apparent on recent CXR here    Please call or message on Microsoft Teams if with any questions.  Spectra [phone]

## 2025-01-06 NOTE — PROGRESS NOTE ADULT - SUBJECTIVE AND OBJECTIVE BOX
SUBJECTIVE/OVERNIGHT EVENTS  Today is hospital day 6d. This morning patient was seen and examined at bedside, resting comfortably in bed. No acute or major events overnight.    MEDICATIONS  STANDING MEDICATIONS  amantadine Syrup 50 milliGRAM(s) Oral two times a day  amLODIPine   Tablet 10 milliGRAM(s) Oral daily  chlorhexidine 2% Cloths 1 Application(s) Topical daily  dextrose 5%. 1000 milliLiter(s) IV Continuous <Continuous>  dextrose 5%. 1000 milliLiter(s) IV Continuous <Continuous>  dextrose 5%. 1000 milliLiter(s) IV Continuous <Continuous>  furosemide    Tablet 40 milliGRAM(s) Oral daily  furosemide   Injectable 40 milliGRAM(s) IV Push two times a day  glucagon  Injectable 1 milliGRAM(s) IntraMuscular once  heparin   Injectable 5000 Unit(s) SubCutaneous every 12 hours  insulin glargine Injectable (LANTUS) 8 Unit(s) SubCutaneous at bedtime  insulin lispro (ADMELOG) corrective regimen sliding scale   SubCutaneous three times a day before meals  insulin lispro Injectable (ADMELOG) 3 Unit(s) SubCutaneous four times a day before meals  lacosamide Solution 200 milliGRAM(s) Oral two times a day  levETIRAcetam  Solution 1500 milliGRAM(s) Oral two times a day  pantoprazole   Suspension 40 milliGRAM(s) Oral daily  piperacillin/tazobactam IVPB.. 3.375 Gram(s) IV Intermittent every 8 hours    PRN MEDICATIONS    VITALS  T(F): 98.3 (01-06-25 @ 04:45), Max: 98.5 (01-05-25 @ 19:52)  HR: 108 (01-06-25 @ 04:45) (108 - 116)  BP: 168/64 (01-06-25 @ 04:45) (152/68 - 182/70)  RR: 18 (01-06-25 @ 04:45) (18 - 19)  SpO2: 99% (01-06-25 @ 04:45) (96% - 99%)  POCT Blood Glucose.: 157 mg/dL (01-06-25 @ 11:46)  POCT Blood Glucose.: 300 mg/dL (01-06-25 @ 07:32)  POCT Blood Glucose.: 249 mg/dL (01-05-25 @ 20:56)  POCT Blood Glucose.: 75 mg/dL (01-05-25 @ 16:35)    PHYSICAL EXAM  GENERAL  ( + ) NAD, lying in bed comfortably     (  ) obtunded     (  ) lethargic     (  ) somnolent    HEAD  ( + ) Atraumatic     (  ) hematoma     (  ) laceration (specify location:       )     NECK  ( + ) Supple     (  ) neck stiffness     (  ) nuchal rigidity     (  )  no JVD     (  ) JVD present ( -- cm)    HEART  Rate -->  ( + ) normal rate    (  ) bradycardic    (  ) tachycardic  Rhythm -->  ( + ) regular    (  ) regularly irregular    (  ) irregularly irregular  Murmurs -->  (  ) normal s1/s2    (  ) systolic murmur    (  ) diastolic murmur    (  ) continuous murmur     (  ) S3 present    (  ) S4 present    LUNGS  ( + )Unlabored respirations     (  ) tachypnea  ( + ) B/L air entry     (  ) decreased breath sounds in:  (location     )    (  ) no adventitious sound     (  ) crackles     (  ) wheezing      (  ) rhonchi      (specify location:       )  (  ) chest wall tenderness (specify location:       )    ABDOMEN  ( + ) Soft     (  ) tense   |   (  ) nondistended     (  ) distended   |   (  ) +BS     (  ) hypoactive bowel sounds     (  ) hyperactive bowel sounds  ( + ) nontender     (  ) RUQ tenderness     (  ) RLQ tenderness     (  ) LLQ tenderness     (  ) epigastric tenderness     (  ) diffuse tenderness  (  ) Splenomegaly      (  ) Hepatomegaly      (  ) Jaundice     (  ) ecchymosis     EXTREMITIES  ( + ) Normal     (  ) Rash     (  ) ecchymosis     (  ) varicose veins      (  ) pitting edema     (  ) non-pitting edema   (  ) ulceration     (  ) gangrene:     (location:     )    NERVOUS SYSTEM  ( + ) A&Ox3     (  ) confused     (  ) lethargic  CN II-XII:     (  ) Intact     (  ) focal deficits  (Specify:     )   Upper extremities:     (  ) strength X/5     (  ) focal deficit (specify:    )  Lower extremities:     (  ) strength  X/5    (  ) focal deficit (specify:    )    SKIN  ( + ) No rashes or lesions     (  ) maculopapular rash     (  ) pustules     (  ) vesicles     (  ) ulcer     (  ) ecchymosis     (specify location:     )    LABS             8.3    15.81 )-----------( 406      ( 01-06-25 @ 05:41 )             26.7     148  |  112  |  48  -------------------------<  249   01-06-25 @ 05:41  4.0  |  23  |  0.9    Ca      8.9     01-06-25 @ 05:41  Mg     1.9     01-06-25 @ 05:41    TPro  4.9  /  Alb  3.1  /  TBili  0.3  /  DBili  x   /  AST  56  /  ALT  36  /  AlkPhos  165  /  GGT  x     01-06-25 @ 05:41    Urinalysis Basic - ( 06 Jan 2025 05:41 )    Color: x / Appearance: x / SG: x / pH: x  Gluc: 249 mg/dL / Ketone: x  / Bili: x / Urobili: x   Blood: x / Protein: x / Nitrite: x   Leuk Esterase: x / RBC: x / WBC x   Sq Epi: x / Non Sq Epi: x / Bacteria: x

## 2025-01-06 NOTE — ADVANCED PRACTICE NURSE CONSULT - RECOMMEDATIONS
Cleanse wound to sacrococcygeal region with Vashe wound cleanser  Pat dry, apply Medihoney, cover with xeroform and abdominal pad twice a day and prn for soiling.     Maintain pressure injury prevention.   Keep skin clean.   Maintain incontinence care.   Monitor skin for changes and notify provider   Case discussed with primary RN

## 2025-01-06 NOTE — PROGRESS NOTE ADULT - SUBJECTIVE AND OBJECTIVE BOX
SIMONA KUHN  79y, Female  Allergy: No Known Allergies      LOS  6d    CHIEF COMPLAINT: anemia (06 Jan 2025 15:46)      INTERVAL EVENTS/HPI  - No acute events overnight  - T(F): , Max: 99 (01-06-25 @ 13:57)  - no fevers - WBC elevated today   - WBC Count: 15.81 (01-06-25 @ 05:41)  WBC Count: 12.90 (01-05-25 @ 07:44)     - Creatinine: 0.9 (01-06-25 @ 05:41)  Creatinine: 0.9 (01-05-25 @ 17:01)       ROS  unable to obtain history secondary to patient's mental status and/or sedation      VITALS:  T(F): 99, Max: 99 (01-06-25 @ 13:57)  HR: 114  BP: 168/72  RR: 18Vital Signs Last 24 Hrs  T(C): 37.2 (06 Jan 2025 13:57), Max: 37.2 (06 Jan 2025 13:57)  T(F): 99 (06 Jan 2025 13:57), Max: 99 (06 Jan 2025 13:57)  HR: 114 (06 Jan 2025 13:57) (108 - 116)  BP: 168/72 (06 Jan 2025 13:57) (152/68 - 182/70)  BP(mean): 104 (06 Jan 2025 13:57) (100 - 104)  RR: 18 (06 Jan 2025 13:57) (18 - 19)  SpO2: 99% (06 Jan 2025 04:45) (96% - 99%)    Parameters below as of 05 Jan 2025 22:00  Patient On (Oxygen Delivery Method): nasal cannula  O2 Flow (L/min): 2      PHYSICAL EXAM:  Gen: cachectic  HEENT: Normocephalic, atraumatic  Neck: supple, no lymphadenopathy  CV: Regular rate & regular rhythm  Lungs: decreased BS at bases, no fremitus  Abdomen: Soft, BS present  Ext: Warm, well perfused  Neuro: non focal, awake  Skin: no rash, no erythema  Lines: no phlebitis    FH: Non-contributory  Social Hx: Non-contributory    TESTS & MEASUREMENTS:                        8.3    15.81 )-----------( 406      ( 06 Jan 2025 05:41 )             26.7     01-06    148[H]  |  112[H]  |  48[H]  ----------------------------<  249[H]  4.0   |  23  |  0.9    Ca    8.9      06 Jan 2025 05:41  Mg     1.9     01-06    TPro  4.9[L]  /  Alb  3.1[L]  /  TBili  0.3  /  DBili  x   /  AST  56[H]  /  ALT  36  /  AlkPhos  165[H]  01-06      LIVER FUNCTIONS - ( 06 Jan 2025 05:41 )  Alb: 3.1 g/dL / Pro: 4.9 g/dL / ALK PHOS: 165 U/L / ALT: 36 U/L / AST: 56 U/L / GGT: x           Urinalysis Basic - ( 06 Jan 2025 05:41 )    Color: x / Appearance: x / SG: x / pH: x  Gluc: 249 mg/dL / Ketone: x  / Bili: x / Urobili: x   Blood: x / Protein: x / Nitrite: x   Leuk Esterase: x / RBC: x / WBC x   Sq Epi: x / Non Sq Epi: x / Bacteria: x        Culture - Blood (collected 01-01-25 @ 07:53)  Source: .Blood BLOOD  Preliminary Report (01-05-25 @ 17:00):    No growth at 4 days    Urinalysis with Rflx Culture (collected 12-31-24 @ 21:53)    Culture - Urine (collected 12-31-24 @ 21:53)  Source: Clean Catch None  Final Report (01-02-25 @ 10:16):    <10,000 CFU/mL Normal Urogenital Jacqueline    Culture - Fungal, Body Fluid (collected 12-11-24 @ 10:26)  Source: Pleural Fl  Preliminary Report (01-01-25 @ 23:01):    No fungus isolated at 3 weeks.    Culture - Body Fluid with Gram Stain (collected 12-11-24 @ 10:26)  Source: Pleural Fl  Gram Stain (12-11-24 @ 20:35):    polymorphonuclear leukocytes seen    No organisms seen    by cytocentrifuge  Final Report (12-16-24 @ 10:23):    No growth at 5 days    Culture - Urine (collected 12-10-24 @ 15:00)  Source: Catheterized None  Final Report (12-12-24 @ 13:43):    10,000 - 49,000 CFU/mL Candida albicans    "Susceptibilities not performed"    Urinalysis with Rflx Culture (collected 12-10-24 @ 15:00)    Culture - Blood (collected 12-10-24 @ 14:01)  Source: .Blood BLOOD  Final Report (12-15-24 @ 23:01):    No growth at 5 days    Culture - Blood (collected 12-10-24 @ 14:01)  Source: .Blood BLOOD  Final Report (12-15-24 @ 23:01):    No growth at 5 days            INFECTIOUS DISEASES TESTING  Procalcitonin: 0.65 (01-01-25 @ 07:54)  MRSA PCR Result.: Negative (01-01-25 @ 06:17)  MRSA PCR Result.: Negative (12-12-24 @ 11:46)  MRSA PCR Result.: Negative (11-02-24 @ 07:00)  Procalcitonin: 0.48 (10-16-24 @ 13:31)  Procalcitonin: 1.04 (10-13-24 @ 12:08)  MRSA PCR Result.: Positive (10-13-24 @ 12:08)  MRSA PCR Result.: NotDetec (10-09-24 @ 05:50)  Procalcitonin: 7.30 (10-09-24 @ 02:40)      INFLAMMATORY MARKERS      RADIOLOGY & ADDITIONAL TESTS:  I have personally reviewed the last available Chest xray  CXR      CT      CARDIOLOGY TESTING  12 Lead ECG:   Ventricular Rate 106 BPM    Atrial Rate 106 BPM    P-R Interval 182 ms    QRS Duration 68 ms    Q-T Interval 302 ms    QTC Calculation(Bazett) 401 ms    P Axis 64 degrees    R Axis 11 degrees    T Axis 48 degrees    Diagnosis Line Sinus tachycardia  Otherwise normal ECG    Confirmed by Alexis Ho ([id number]) on 1/2/2025 12:34:46 PM (01-02-25 @ 07:21)  12 Lead ECG:   Ventricular Rate 88 BPM    Atrial Rate 88 BPM    P-R Interval 166 ms    QRS Duration 86 ms    Q-T Interval 362 ms    QTC Calculation(Bazett) 438 ms    P Axis 37 degrees    R Axis 17 degrees    T Axis 44 degrees    Diagnosis Line Normal sinus rhythm  Low voltage QRS  Cannot rule out Anterior infarct , age undetermined  Abnormal ECG    Confirmed by Alexis Ho ([id number]) on 1/2/2025 7:38:28 AM (01-01-25 @ 05:28)      MEDICATIONS  amantadine Syrup 50 Oral two times a day  amLODIPine   Tablet 10 Oral daily  chlorhexidine 2% Cloths 1 Topical daily  dextrose 5%. 1000 IV Continuous <Continuous>  furosemide   Injectable 40 IV Push two times a day  glucagon  Injectable 1 IntraMuscular once  heparin   Injectable 5000 SubCutaneous every 12 hours  insulin glargine Injectable (LANTUS) 8 SubCutaneous at bedtime  insulin lispro (ADMELOG) corrective regimen sliding scale  SubCutaneous three times a day before meals  insulin lispro Injectable (ADMELOG) 3 SubCutaneous four times a day before meals  lacosamide Solution 200 Oral two times a day  levETIRAcetam  Solution 1500 Oral two times a day  loperamide Liquid 2 Oral daily  pantoprazole   Suspension 40 Oral daily  piperacillin/tazobactam IVPB.. 3.375 IV Intermittent every 8 hours      WEIGHT  Weight (kg): 65.9 (01-01-25 @ 00:00)  Creatinine: 0.9 mg/dL (01-06-25 @ 05:41)  Creatinine: 0.9 mg/dL (01-05-25 @ 17:01)      ANTIBIOTICS:  piperacillin/tazobactam IVPB.. 3.375 Gram(s) IV Intermittent every 8 hours      All available historical records have been reviewed

## 2025-01-06 NOTE — PROGRESS NOTE ADULT - ASSESSMENT
79-year-old woman with PMHx of myelodysplastic syndrome (MDS) followed by Dr. Wade (requiring periodic transfusions for anemia), DM, HTN, CKD 2, and multiple recent admissions for a b/l subdural hematomas (SDH) with mass effect and midline shift (status-post evacuation and middle meningeal artery embolization complicated by a leaking craniotomy site and concern for status epilepticus) presented to the ED for anemia on 12/31. Patient was at [facility] center was found to be anemic to 5.6 being sent to ED for transfusion. She is nonverbal, bedbound, and has a PEG tube.    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   - Avoid nephrotoxins:  NSAIDS, IV contrast, Aminoglycosides, fleet enemas  - continue to monitor BMP qd  - s/p Lokelma    #hypernatremia   - Na 150 >150 > 149   - c/w PO lasix   - lasix IV 40mg q12 x 1 day   - c/w dextrose 5% @75 cc/her   - increase free water flush to 250cc q6   - monitor BMP     #Chronic bilateral effusions   - pro-BNP 9727, significantly increased from 1439 in Oct 2024  - c/w PO 40 daily Lasix    #Fever due to possible UTI - resolved   #Stage 3 sacral ulcer  - Seen by infectious disease due to pyuria, though hard to differentiate if asymptomatic vs real infection -- transitioned to IV Zosyn 3.375 gram q 8 hours.   - 12/31 Urine Cx negative. Renal US without hydro.   - wound care eval for sacral ulcer  - rvp neg     #Diarrhea- improving   - monitor electrolytes     #DM  - c/w insulin  - increase Lantus to 8u   - increase lispro to 3u     #HO SDH   - sp evacuation and middle meningeal artery embolization complicated by a leaking craniotomy site and concern for status epilepticus  - c/w home antiepileptics    DVT ppx: Lovenox  GI ppx: PPI  Diet: NPO with tube feeds  Activity: IAT  Code: DNR/DNI  Pending: hospice placement  79-year-old woman with PMHx of myelodysplastic syndrome (MDS) followed by Dr. Wade (requiring periodic transfusions for anemia), DM, HTN, CKD 2 (baseline Cr ~1), and multiple recent admissions for a b/l subdural hematomas (SDH) with mass effect and midline shift (status-post evacuation and middle meningeal artery embolization complicated by a leaking craniotomy site and concern for status epilepticus) presented to the ED for anemia on 12/31. Patient was at [facility] center was found to be anemic to 5.6 being sent to ED for transfusion. She is nonverbal, bedbound, and has a PEG tube.    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   - Avoid nephrotoxins:  NSAIDS, IV contrast, Aminoglycosides, fleet enemas  - continue to monitor BMP qd  - s/p Lokelma    #hypernatremia   - Na 150 >150 > 149   - c/w PO lasix   - lasix IV 40mg q12 x 1 day   - c/w dextrose 5% @75 cc/her   - increase free water flush to 250cc q6   - monitor BMP     #Chronic bilateral effusions   - pro-BNP 9727, significantly increased from 1439 in Oct 2024  - c/w PO 40 daily Lasix    #Fever due to possible UTI - resolved   #Stage 3 sacral ulcer  - Seen by infectious disease due to pyuria, though hard to differentiate if asymptomatic vs real infection -- transitioned to IV Zosyn 3.375 gram q 8 hours.   - 12/31 Urine Cx negative. Renal US without hydro.   - wound care eval for sacral ulcer  - rvp neg     #Diarrhea- improving   - monitor electrolytes     #DM  - c/w insulin  - increase Lantus to 8u   - increase lispro to 3u     #HO SDH   - sp evacuation and middle meningeal artery embolization complicated by a leaking craniotomy site and concern for status epilepticus  - c/w home antiepileptics    DVT ppx: Lovenox  GI ppx: PPI  Diet: NPO with tube feeds  Activity: IAT  Code: DNR/DNI  Pending: hospice placement

## 2025-01-06 NOTE — PROGRESS NOTE ADULT - ASSESSMENT
79-year-old woman with PMHx of myelodysplastic syndrome (MDS) followed by Dr. Wade (requiring periodic transfusions for anemia), DM, HTN, CKD 2, and multiple recent admissions for a b/l subdural hematomas (SDH) with mass effect and midline shift (status-post evacuation and middle meningeal artery embolization complicated by a leaking craniotomy site and concern for status epilepticus) presented to the ED for anemia on 12/31. Patient was at [facility] center was found to be anemic to 5.6 being sent to ED for transfusion. She is nonverbal, bedbound, and has a PEG tube.    # This is a STAR patient - GOC done    # pt is immunocompromised 2/2 age, MDS, DM    # pt is non-verbal and has functional quadriplegia. Prior to arrival to ED on 10/8/24 for being found unresponsive on floor, pt was independent of all ADLs, lived alone, took care of herself well and had no dementia.    # Fever and leukocytosis - suspect asp PNA/pneumonitis  RVP neg  BCx neg  UCx neg - doubt UTI  MRSA nares: neg  CXR w/ opac b/l  hx aspiration  wbc worse - abx changed  c/w NC O2 to keep sat > 92% (decr to 1L)  ID eval  abx: zosyn 3.375gm iv q8 - likely tx 1wk (1/1-1/8)    # hypervolemic hypernatremia - need to tx both  BP on higher side  lasix 40mg iv q12 for today  IVFs: D5 75/hr  free water added after feeds  always correct Na for glucose  fix DM (causing osmotic diuresis w/ dehydration)  has loose BM - avoid laxatives  try imodium 2mg PEG q24    # DM w/ hyperglycemia  Diet: on nepro w/ carb steady via PEG  FS 4x/day before feeds (NOT ac/hs)  goal for -180  incr lantus 8u HS  incr lisp 3u before each feed (4x/day)    # Unstageable sacral ulcer 4x7cm  WOCN noted:  Cleanse wound to sacrococcygeal region with Vashe wound cleanser. Pat dry, apply Medihoney, cover with xeroform and abdominal pad twice a day and prn for soiling.     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)  s/p two units pRBCs  hgb stable  no overt bleeding    # marked hyperkalemia - resolved    # CKD2 (cr at baseline)  Cr 0.9 - at baseline    # Chronic bilateral effusions   pro-BNP 9727, significantly increased from 1439 in Oct 2024  lasix as above    # HO SDH   sp evacuation and middle meningeal artery embolization complicated by a leaking craniotomy site and concern for status epilepticus  c/w home antiepileptics    # chr ocampo  failed TOV    # minor LFT abnl - prob 2/2 illness and meds    # DVT ppx: Hep sc q12    # GI ppx: PPI PEG    # Activity: bedbound; func quad    # Code: DNR/DNI    Dispo: c/w ocampo; c/w diginishield; try imodium; fix DM; IVFs; free water; iv lasix; sacral care;   eventually, pt will go back to NH ([facility]) as STR - f/u CM - still acute    Prog is poor. Could consider hospice at NH.

## 2025-01-07 NOTE — PROGRESS NOTE ADULT - SUBJECTIVE AND OBJECTIVE BOX
SIMONA UKHN  79y, Female  Allergy: No Known Allergies      LOS  7d    CHIEF COMPLAINT: sepsis (07 Jan 2025 09:22)      INTERVAL EVENTS/HPI  - No acute events overnight  - T(F): , Max: 99.1 (01-07-25 @ 04:41)  - WBC improved today   - limited historian   - WBC Count: 12.23 (01-07-25 @ 07:31)  WBC Count: 15.81 (01-06-25 @ 05:41)     - Creatinine: 1.0 (01-07-25 @ 07:31)  Creatinine: 0.9 (01-06-25 @ 05:41)       ROS  unable to obtain history secondary to patient's mental status and/or sedation    VITALS:  T(F): 98.9, Max: 99.1 (01-07-25 @ 04:41)  HR: 115  BP: 168/62  RR: 18Vital Signs Last 24 Hrs  T(C): 37.2 (07 Jan 2025 08:24), Max: 37.3 (07 Jan 2025 04:41)  T(F): 98.9 (07 Jan 2025 08:24), Max: 99.1 (07 Jan 2025 04:41)  HR: 115 (07 Jan 2025 08:24) (106 - 115)  BP: 168/62 (07 Jan 2025 08:24) (168/62 - 190/68)  BP(mean): 104 (06 Jan 2025 13:57) (104 - 104)  RR: 18 (07 Jan 2025 08:24) (18 - 18)  SpO2: 98% (07 Jan 2025 08:24) (98% - 100%)    Parameters below as of 07 Jan 2025 08:24  Patient On (Oxygen Delivery Method): nasal cannula  O2 Flow (L/min): 1.5      PHYSICAL EXAM:  Gen: NAD, resting in bed  HEENT: Normocephalic, atraumatic  Neck: supple, no lymphadenopathy  CV: Regular rate & regular rhythm  Lungs: decreased BS at bases, no fremitus  Abdomen: Soft, BS present  Ext: Warm, well perfused  Neuro: non focal, awake  Skin: no rash, no erythema  Lines: no phlebitis; dignishield in place    FH: Non-contributory  Social Hx: Non-contributory    TESTS & MEASUREMENTS:                        7.7    12.23 )-----------( 375      ( 07 Jan 2025 07:31 )             23.9     01-07    142  |  108  |  50[H]  ----------------------------<  263[H]  3.8   |  21  |  1.0    Ca    8.7      07 Jan 2025 07:31  Mg     1.8     01-07    TPro  4.8[L]  /  Alb  2.9[L]  /  TBili  0.3  /  DBili  x   /  AST  42[H]  /  ALT  34  /  AlkPhos  146[H]  01-07      LIVER FUNCTIONS - ( 07 Jan 2025 07:31 )  Alb: 2.9 g/dL / Pro: 4.8 g/dL / ALK PHOS: 146 U/L / ALT: 34 U/L / AST: 42 U/L / GGT: x           Urinalysis Basic - ( 07 Jan 2025 07:31 )    Color: x / Appearance: x / SG: x / pH: x  Gluc: 263 mg/dL / Ketone: x  / Bili: x / Urobili: x   Blood: x / Protein: x / Nitrite: x   Leuk Esterase: x / RBC: x / WBC x   Sq Epi: x / Non Sq Epi: x / Bacteria: x        Culture - Blood (collected 01-01-25 @ 07:53)  Source: .Blood BLOOD  Final Report (01-06-25 @ 17:00):    No growth at 5 days    Urinalysis with Rflx Culture (collected 12-31-24 @ 21:53)    Culture - Urine (collected 12-31-24 @ 21:53)  Source: Clean Catch None  Final Report (01-02-25 @ 10:16):    <10,000 CFU/mL Normal Urogenital Jacqueline    Culture - Fungal, Body Fluid (collected 12-11-24 @ 10:26)  Source: Pleural Fl  Preliminary Report (01-01-25 @ 23:01):    No fungus isolated at 3 weeks.    Culture - Body Fluid with Gram Stain (collected 12-11-24 @ 10:26)  Source: Pleural Fl  Gram Stain (12-11-24 @ 20:35):    polymorphonuclear leukocytes seen    No organisms seen    by cytocentrifuge  Final Report (12-16-24 @ 10:23):    No growth at 5 days    Culture - Urine (collected 12-10-24 @ 15:00)  Source: Catheterized None  Final Report (12-12-24 @ 13:43):    10,000 - 49,000 CFU/mL Candida albicans    "Susceptibilities not performed"    Urinalysis with Rflx Culture (collected 12-10-24 @ 15:00)    Culture - Blood (collected 12-10-24 @ 14:01)  Source: .Blood BLOOD  Final Report (12-15-24 @ 23:01):    No growth at 5 days    Culture - Blood (collected 12-10-24 @ 14:01)  Source: .Blood BLOOD  Final Report (12-15-24 @ 23:01):    No growth at 5 days            INFECTIOUS DISEASES TESTING  Procalcitonin: 0.65 (01-01-25 @ 07:54)  MRSA PCR Result.: Negative (01-01-25 @ 06:17)  MRSA PCR Result.: Negative (12-12-24 @ 11:46)  MRSA PCR Result.: Negative (11-02-24 @ 07:00)  Procalcitonin: 0.48 (10-16-24 @ 13:31)  Procalcitonin: 1.04 (10-13-24 @ 12:08)  MRSA PCR Result.: Positive (10-13-24 @ 12:08)  MRSA PCR Result.: NotDetec (10-09-24 @ 05:50)  Procalcitonin: 7.30 (10-09-24 @ 02:40)      INFLAMMATORY MARKERS      RADIOLOGY & ADDITIONAL TESTS:  I have personally reviewed the last available Chest xray  CXR      CT      CARDIOLOGY TESTING  12 Lead ECG:   Ventricular Rate 106 BPM    Atrial Rate 106 BPM    P-R Interval 182 ms    QRS Duration 68 ms    Q-T Interval 302 ms    QTC Calculation(Bazett) 401 ms    P Axis 64 degrees    R Axis 11 degrees    T Axis 48 degrees    Diagnosis Line Sinus tachycardia  Otherwise normal ECG    Confirmed by Alexis Ho ([id number]) on 1/2/2025 12:34:46 PM (01-02-25 @ 07:21)  12 Lead ECG:   Ventricular Rate 88 BPM    Atrial Rate 88 BPM    P-R Interval 166 ms    QRS Duration 86 ms    Q-T Interval 362 ms    QTC Calculation(Bazett) 438 ms    P Axis 37 degrees    R Axis 17 degrees    T Axis 44 degrees    Diagnosis Line Normal sinus rhythm  Low voltage QRS  Cannot rule out Anterior infarct , age undetermined  Abnormal ECG    Confirmed by Alexis Ho ([id number]) on 1/2/2025 7:38:28 AM (01-01-25 @ 05:28)      MEDICATIONS  amantadine Syrup 50 Oral two times a day  amLODIPine   Tablet 10 Oral daily  chlorhexidine 2% Cloths 1 Topical daily  dextrose 5%. 1000 IV Continuous <Continuous>  furosemide   Injectable 40 IV Push two times a day  glucagon  Injectable 1 IntraMuscular once  heparin   Injectable 5000 SubCutaneous every 12 hours  insulin glargine Injectable (LANTUS) 16 SubCutaneous at bedtime  insulin glargine Injectable (LANTUS) 5 SubCutaneous at bedtime  insulin lispro (ADMELOG) corrective regimen sliding scale  SubCutaneous four times a day before meals  insulin lispro Injectable (ADMELOG) 3 SubCutaneous four times a day before meals  labetalol 100 Oral every 8 hours  lacosamide Solution 200 Oral two times a day  levETIRAcetam  Solution 1500 Oral two times a day  loperamide Liquid 2 Oral daily  pantoprazole   Suspension 40 Oral daily  piperacillin/tazobactam IVPB.. 3.375 IV Intermittent every 8 hours      WEIGHT  Weight (kg): 65.9 (01-01-25 @ 00:00)  Creatinine: 1.0 mg/dL (01-07-25 @ 07:31)      ANTIBIOTICS:  piperacillin/tazobactam IVPB.. 3.375 Gram(s) IV Intermittent every 8 hours      All available historical records have been reviewed

## 2025-01-07 NOTE — PROGRESS NOTE ADULT - SUBJECTIVE AND OBJECTIVE BOX
SIMONA KUHN  79y  Female  ***My note supersedes ALL resident notes that I sign.  My corrections for their notes are in my note.***    I can be reached directly via Teams or my office number is [phone] or [phone].    INTERVAL EVENTS: Here for f/u of hypernatremia. Pt remains asleep and not interactive. Charlie feeds.    T(F): 98.9 (01-07-25 @ 08:24), Max: 99.1 (01-07-25 @ 04:41)  HR: 115 (01-07-25 @ 08:24) (106 - 115)  BP: 168/62 (01-07-25 @ 08:24) (168/62 - 190/68)  RR: 18 (01-07-25 @ 08:24) (18 - 18)  SpO2: 98% (01-07-25 @ 08:24) (98% - 100%)    01-06-25 @ 07:01  -  01-07-25 @ 07:00  --------------------------------------------------------  IN: 0 mL / OUT: 1900 mL / NET: -1900 mL    Gen: no resp distress; non-verb; not interactive  HEENT: PERRL, mouth clr, nose clr; + NC O2 (1.5 L/min)  Neck: no nodes, no JVD, thyroid nl  chest: rt port-a-cath  lungs: decr BS bases  hrt: s1 s2 reg, tachy, no murmur  abd: soft, NT/ND, no HS megaly; PEG - site w/ mild erythema; min d/c (will ask RN to remove gauze under bumper and put fresh gauze over bumper and tape in place.  ext: 1+ edema, no c/c  neuro: not awake or alert; legs are a bit contracted  : + ocampo: yel urine; no bolld  Anal: + Dignishield w/ brn soft stool - no blood    LABS:                      7.7     (    89.5   12.23 )-----------( ---------      375      ( 07 Jan 2025 07:31 )             23.9    (    17.5     WBC Count: 12.23 K/uL (01-07-25 @ 07:31) - little better  WBC Count: 15.81 K/uL (01-06-25 @ 05:41)  WBC Count: 12.90 K/uL (01-05-25 @ 07:44)  WBC Count: 13.49 K/uL (01-04-25 @ 22:34)  WBC Count: 10.58 K/uL (01-04-25 @ 07:48)  WBC Count: 12.17 K/uL (01-03-25 @ 07:19)  WBC Count: 13.90 K/uL (01-02-25 @ 22:45)    Hemoglobin: 7.7 g/dL (01-07 @ 07:31)  Hemoglobin: 8.3 g/dL (01-06 @ 05:41)  Hemoglobin: 8.8 g/dL (01-05 @ 07:44)  Hemoglobin: 9.1 g/dL (01-04 @ 22:34)  Hemoglobin: 8.2 g/dL (01-04 @ 07:48)  Hemoglobin: 8.9 g/dL (01-03 @ 07:19)  Hemoglobin: 8.9 g/dL (01-02 @ 22:45)    142   (   108   (   263      01-07-25 @ 07:31  ----------------------               3.8   (   21   (   50                             -----                        1.0  Ca  8.7   Mg  1.8    P   --     Sodium: 142 mmol/L (01-07-25 @ 07:31) - corrected for gluc = 144.5  Sodium: 148 mmol/L (01-06-25 @ 05:41)  Sodium: 149 mmol/L (01-05-25 @ 17:01)  Sodium: 150 mmol/L (01-05-25 @ 07:44)  Sodium: 150 mmol/L (01-04-25 @ 22:34)  Sodium: 150 mmol/L (01-04-25 @ 07:48)  Sodium: 146 mmol/L (01-03-25 @ 07:19)    LFT  4.8  (  0.3  (  42       01-07-25 @ 07:31  -------------------------  2.9  (  146  (  34    Urinalysis Basic - ( 07 Jan 2025 07:31 )    Color: x / Appearance: x / SG: x / pH: x  Gluc: 263 mg/dL / Ketone: x  / Bili: x / Urobili: x   Blood: x / Protein: x / Nitrite: x   Leuk Esterase: x / RBC: x / WBC x   Sq Epi: x / Non Sq Epi: x / Bacteria: x    CAPILLARY BLOOD GLUCOSE  POCT Blood Glucose.: 258 (01-07-25 @ 11:28)  POCT Blood Glucose.: 290 (01-07-25 @ 09:25)  POCT Blood Glucose.: 281 (01-07-25 @ 06:42)  POCT Blood Glucose.: 204 (01-06-25 @ 17:16)  POCT Blood Glucose.: 157 (01-06-25 @ 11:46)  POCT Blood Glucose.: 300 (01-06-25 @ 07:32)  POCT Blood Glucose.: 249 (01-05-25 @ 20:56)  POCT Blood Glucose.: 75 (01-05-25 @ 16:35)    RADIOLOGY & ADDITIONAL TESTS:    MEDICATIONS:  piperacillin/tazobactam IVPB.. 3.375 Gram(s) IV Intermittent every 8 hours    amantadine Syrup 50 milliGRAM(s) Oral two times a day  amLODIPine   Tablet 10 milliGRAM(s) Oral daily  chlorhexidine 2% Cloths 1 Application(s) Topical daily  furosemide   Injectable 40 milliGRAM(s) IV Push two times a day  heparin   Injectable 5000 Unit(s) SubCutaneous every 12 hours  insulin glargine Injectable (LANTUS) 16 Unit(s) SubCutaneous at bedtime  insulin glargine Injectable (LANTUS) 5 Unit(s) SubCutaneous at bedtime  insulin lispro (ADMELOG) corrective regimen sliding scale   SubCutaneous four times a day before meals  insulin lispro Injectable (ADMELOG) 3 Unit(s) SubCutaneous four times a day before meals  labetalol 100 milliGRAM(s) Oral every 8 hours  lacosamide Solution 200 milliGRAM(s) Oral two times a day  levETIRAcetam  Solution 1500 milliGRAM(s) Oral two times a day  loperamide Liquid 2 milliGRAM(s) Oral daily  pantoprazole   Suspension 40 milliGRAM(s) Oral daily

## 2025-01-07 NOTE — PROGRESS NOTE ADULT - ASSESSMENT
Problem: At Risk for Falls  Goal: Patient does not fall  Outcome: Monitoring/Evaluating progress  Goal: Patient takes action to control fall-related risks  Outcome: Monitoring/Evaluating progress     Problem: At Risk for Injury Due to Fall  Goal: Patient does not fall  Outcome: Monitoring/Evaluating progress  Goal: Takes action to control condition specific risks  Outcome: Monitoring/Evaluating progress  Goal: Verbalizes understanding of fall-related injury personal risks  Description: Document education using the patient education activity  Outcome: Monitoring/Evaluating progress     Problem: VTE (Actual)  Goal: Patient maintains mobility and remains free from complications of VTE  Outcome: Monitoring/Evaluating progress     Problem: Diabetes  Goal: Achieves glycemic balance  Description: Goal is to maintain blood sugar within range with no episodes of hypoglycemia  Outcome: Monitoring/Evaluating progress  Goal: Verbalizes/demonstrates understanding of NEW diagnosis of diabetes and management  Description: Document on Patient Education Activity  Outcome: Monitoring/Evaluating progress  Goal: Verbalizes understanding of diabetes management including how to use HbA1C to evaluate status of blood sugar over time (Diabetes is NOT a new diagnosis)  Description: Diabetes Education  Outcome: Monitoring/Evaluating progress  Goal: Demonstrates ability to self-administer insulin  Description: Document on Patient Education Activity  Outcome: Monitoring/Evaluating progress      ASSESSMENT  This is a 79-year-old woman with PMHx of myelodysplastic syndrome (MDS) ,DM, HTN, CKD 2, and multiple recent admissions for a b/l subdural hematomas (SDH) with mass effect and midline shift (status-post evacuation and middle meningeal artery embolization complicated by a leaking craniotomy site and concern for status epilepticus) presented to the ED anemia.    IMPRESSION  #Anemia  #MDS, transfusion dependent- Revlimid and Vidaza, currently on Lusparacept, Last dose of Luspatercept documented was 10/1/34    #Fever - Pneumonia?  -  MRSA nares negative   - CXR with bilateral pleural effusions     #Pyruia- Hard to differentiate if asymptomatic vs real infection.   - Renal US 1/1 - no hydro   - Urine Cx 12/31 NG     #Patient Non-verbal and functional quadriplegic +Peg tube.   #Chronic respiratory failure. Bilateral pleural effusions  #Was managed for pneumonia and had thoracentesis 12/11/2024. Cultures negative   #Sacral Ulcer noted. 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    RECOMMENDATIONS  - WBC downtrending   - continue zosyn 3.375 mg q 8 hours -- agree with last day on 1/8     Please call or message on [employer] Teams if with any questions.  Spectra [phone]

## 2025-01-07 NOTE — DISCHARGE NOTE PROVIDER - NSDCCPCAREPLAN_GEN_ALL_CORE_FT
PRINCIPAL DISCHARGE DIAGNOSIS  Diagnosis: Sepsis due to urinary tract infection  Assessment and Plan of Treatment: You came in with signs of a lung infection, possibly because some food or liquid went down the wrong way (aspiration), causing pneumonia. We did some tests to rule out other infections, and your chest x-ray showed some cloudiness in your lungs. We gave you antibiotics (Zosyn) through your IV and oxygen to help you breathe. You also had an imbalance of fluids and salt in your body, which can raise blood pressure. We gave you medicine (Lasix) to help remove extra fluid and carefully managed your IV fluids. Because you have diabetes, your blood sugar was also high. We adjusted your insulin (Lantus and Lispro) and checked your blood sugar regularly. You'll continue taking insulin and checking your blood sugar at home. Your tube feeding (Nepro) has been adjusted to help keep your blood sugar steady. We also gave you something to help with loose stools.   You and your family have decided to go with hospice care at SNF and we will support that.   It's important to follow up with your doctor if necessary, take all medications as prescribed.      SECONDARY DISCHARGE DIAGNOSES  Diagnosis: Hyperkalemia  Assessment and Plan of Treatment:      PRINCIPAL DISCHARGE DIAGNOSIS  Diagnosis: Sepsis due to urinary tract infection  Assessment and Plan of Treatment: You came in with signs of a lung infection, possibly because some food or liquid went down the wrong way (aspiration), causing pneumonia. We did some tests to rule out other infections, and your chest x-ray showed some cloudiness in your lungs. We gave you antibiotics (Zosyn) through your IV and oxygen to help you breathe. You also had an imbalance of fluids and salt in your body, which can raise blood pressure. We gave you medicine (Lasix) to help remove extra fluid and carefully managed your IV fluids. Because you have diabetes, your blood sugar was also high. We adjusted your insulin (Lantus and Lispro) and checked your blood sugar regularly. You'll continue taking insulin and checking your blood sugar at home. Your tube feeding (Nepro) has been adjusted to help keep your blood sugar steady. We also gave you something to help with loose stools. Your blood pressure was also elevated so we added a medication called Labetalol 200mg BID in addition to your amlodipine 10mg daily for HTN.   You and your family have decided to go with hospice care at [facility] and we will support that.   It's important to follow up with your doctor if necessary, take all medications as prescribed.      SECONDARY DISCHARGE DIAGNOSES  Diagnosis: Hyperkalemia  Assessment and Plan of Treatment:

## 2025-01-07 NOTE — PROGRESS NOTE ADULT - ASSESSMENT
79-year-old woman with PMHx of myelodysplastic syndrome (MDS) followed by Dr. Wade (requiring periodic transfusions for anemia), DM, HTN, CKD 2, and multiple recent admissions for a b/l subdural hematomas (SDH) with mass effect and midline shift (status-post evacuation and middle meningeal artery embolization complicated by a leaking craniotomy site and concern for status epilepticus) presented to the ED for anemia on 12/31. Patient was at [facility] was found to be anemic to 5.6 being sent to ED for transfusion. She is nonverbal, bedbound, and has a PEG tube.    # This is a STAR patient - GOC done    # pt is immunocompromised 2/2 age, MDS, DM    # pt is non-verbal and has functional quadriplegia. Prior to arrival to ED on 10/8/24 for being found unresponsive on floor, pt was independent of all ADLs, lived alone, took care of herself well and had no dementia.    # Fever and leukocytosis - suspect asp PNA/pneumonitis  RVP neg  BCx neg  UCx neg - doubt UTI  MRSA nares: neg  CXR w/ opac b/l  hx aspiration  wbc improving  c/w NC O2 to keep sat > 92% (decr to 1L)  ID eval  abx: zosyn 3.375gm iv q8 - likely tx 10 days (1/1-1/11)  cbc q24    # hypervolemic hypernatremia - need to tx both  BP on higher side  lasix 40mg iv q12 for today  daily wts  IVFs: off  incr free water added after feeds (400cc PEG q6 2 hrs after each feed)  always correct Na for glucose  fix DM (causing osmotic diuresis w/ dehydration)  has loose BM - avoid laxatives  imodium 2mg PEG q24  BMP q24    # HTN  amlodipine 10mg peg q24  start labetalol 100mg po q8    # DM w/ hyperglycemia  Diet: on nepro w/ carb steady via PEG  FS 4x/day before feeds (NOT ac/hs)  goal for -180  incr lantus 16u HS, give 5u lantus extra this AM  c/w lisp 3u before each feed (4x/day)    # Unstageable sacral ulcer 4x7cm  WOCN noted:  Cleanse wound to sacrococcygeal region with Vashe wound cleanser. Pat dry, apply Medihoney, cover with xeroform and abdominal pad twice a day and prn for soiling.     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 Guaze can go OVER bumper and be taped in place    # chr ocampo  failed TOV    # minor LFT abnl - prob 2/2 illness and meds    # DVT ppx: Hep sc q12    # GI ppx: PPI PEG    # Activity: bedbound; func quad    # Code: DNR/DNI    Dispo: c/w ocampo; c/w diginishield (stop on d/c); c/w imodium; fix DM; d/c IVFs; incr free water; iv lasix; daily wts; sacral care; iv abx  eventually, pt will go to NH on hospice - f/u CM - stable for d/c, if accepted by hospice    Prog is poor.

## 2025-01-07 NOTE — DISCHARGE NOTE PROVIDER - NSDCMRMEDTOKEN_GEN_ALL_CORE_FT
Acidophilus oral tablet: 1 tab(s) orally once a day  amantadine 50 mg/5 mL oral liquid: 50 milligram(s) orally 2 times a day  amLODIPine 10 mg oral tablet: 1 tab(s) orally once a day  famotidine 40 mg/5 mL oral suspension: 2.5 milliliter(s) orally once a day  heparin: 5,000 unit(s) subcutaneous 2 times a day  insulin glargine 100 units/mL subcutaneous solution: 5 unit(s) subcutaneous once a day (at bedtime)  labetalol 200 mg oral tablet: 1 tab(s) orally 2 times a day  lacosamide 10 mg/mL oral solution: 20 milliliter(s) orally every 12 hours  Lasix 20 mg oral tablet: 1 tab(s) orally once a day  levETIRAcetam 100 mg/mL oral solution: 15 milliliter(s) orally every 12 hours  lisinopril 10 mg oral tablet: 1 tab(s) orally once a day  loperamide 1 mg/7.5 mL oral liquid: 15 milliliter(s) orally as needed for  diarrhea  pantoprazole 40 mg oral delayed release tablet: 1 tab(s) orally once a day (before a meal)  pyridoxine 100 mg oral tablet: 1 tab(s) orally once a day  senna leaf extract oral tablet: 2 tab(s) orally once a day (at bedtime)  vitamin A and D topical ointment: 1 Apply topically to affected area 2 times a day

## 2025-01-07 NOTE — DISCHARGE NOTE PROVIDER - HOSPITAL COURSE
79-year-old woman with PMHx of myelodysplastic syndrome (MDS) followed by Dr. Wade (requiring periodic transfusions for anemia), DM, HTN, CKD 2, and multiple recent admissions for a b/l subdural hematomas (SDH) with mass effect and midline shift (status-post evacuation and middle meningeal artery embolization complicated by a leaking craniotomy site and concern for status epilepticus) presented to the ED for anemia on 12/31. Patient was at [facility] center was found to be anemic to 5.6 being sent to ED for transfusion. She is nonverbal, bedbound, and has a PEG tube.    #this is a STAR patient - GOC done  # pt is immunocompromised 2/2 age, MDS, DM    # pt is non-verbal and has functional quadriplegia. Prior to arrival to ED on 10/8/24 for being found unresponsive on floor, pt was independent of all ADLs, lived alone, took care of herself well and had no dementia.    # Fever and leukocytosis - suspect asp PNA/pneumonitis  RVP neg  BCx neg  UCx neg - doubt UTI  MRSA nares: neg  CXR w/ opac b/l  hx aspiration  c/w NC O2 to keep sat > 92% (decr to 1L)  abx: zosyn 3.375gm iv q8 - likely tx 1wk (1/1-1/8)    # hypervolemic hypernatremia - need to tx both  BP on higher side  lasix 40mg iv q12   IVFs: D5 50/hr  free water added after feeds  always correct Na for glucose  has loose BM - avoid laxatives  try imodium 2mg PEG q24    # DM w/ hyperglycemia  Diet: on nepro w/ carb steady via PEG  FS 4x/day before feeds   goal for -180  incr lantus 16u HS  c/w lisp 3u before each feed (4x/day)    # Unstageable sacral ulcer 4x7cm  WOCN noted:  Cleanse wound to sacrococcygeal region with Vashe wound cleanser. Pat dry, apply Medihoney, cover with xeroform and abdominal pad twice a day and prn for soiling.     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)  s/p two units pRBCs  hgb stable  no overt bleeding    # marked hyperkalemia - resolved    # CKD2 (cr at baseline)  Cr 0.9 - at baseline    # Chronic bilateral effusions   pro-BNP 9727, significantly increased from 1439 in Oct 2024  lasix as above    # HO SDH   sp evacuation and middle meningeal artery embolization complicated by a leaking craniotomy site and concern for status epilepticus  c/w home antiepileptics    # chr ocampo  failed TOV    # minor LFT abnl - prob 2/2 illness and meds    Discussion of discharge plan of care, including discharge diagnoses, medication reconciliation, and follow-ups was conducted with Dr. Yadav on 1/7/25, and discharge was approved.   79-year-old woman with PMHx of myelodysplastic syndrome (MDS) followed by Dr. Wade (requiring periodic transfusions for anemia), DM, HTN, CKD 2, and multiple recent admissions for a b/l subdural hematomas (SDH) with mass effect and midline shift (status-post evacuation and middle meningeal artery embolization complicated by a leaking craniotomy site and concern for status epilepticus) presented to the ED for anemia on 12/31. Patient was at [facility] center was found to be anemic to 5.6 being sent to ED for transfusion. She is nonverbal, bedbound, and has a PEG tube.    #this is a STAR patient - GOC done  # pt is immunocompromised 2/2 age, MDS, DM    # pt is non-verbal and has functional quadriplegia. Prior to arrival to ED on 10/8/24 for being found unresponsive on floor, pt was independent of all ADLs, lived alone, took care of herself well and had no dementia.    # Fever and leukocytosis - suspect asp PNA/pneumonitis  RVP neg  BCx neg  UCx neg - doubt UTI  MRSA nares: neg  CXR w/ opac b/l  hx aspiration  c/w NC O2 to keep sat > 92% (decr to 1L)  abx: zosyn 3.375gm iv q8 - likely tx 1wk (1/1-1/8)    # hypervolemic hypernatremia - need to tx both  BP on higher side  lasix 40mg iv q12   IVFs: D5 50/hr  free water added after feeds  always correct Na for glucose  has loose BM - avoid laxatives  try imodium 2mg PEG q24    # DM w/ hyperglycemia  Diet: on nepro w/ carb steady via PEG  FS 4x/day before feeds   goal for -180  incr lantus 16u HS  c/w lisp 3u before each feed (4x/day)    #HTN   - c/w amlodipine 10mg daily   - start labetalol 100mg q8     # Unstageable sacral ulcer 4x7cm  WOCN noted:  Cleanse wound to sacrococcygeal region with Vashe wound cleanser. Pat dry, apply Medihoney, cover with xeroform and abdominal pad twice a day and prn for soiling.     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)  s/p two units pRBCs  hgb stable  no overt bleeding    # marked hyperkalemia - resolved    # CKD2 (cr at baseline)  Cr 0.9 - at baseline    # Chronic bilateral effusions   pro-BNP 9727, significantly increased from 1439 in Oct 2024  lasix as above    # HO SDH   sp evacuation and middle meningeal artery embolization complicated by a leaking craniotomy site and concern for status epilepticus  c/w home antiepileptics    # chr ocampo  failed TOV    # minor LFT abnl - prob 2/2 illness and meds    Discussion of discharge plan of care, including discharge diagnoses, medication reconciliation, and follow-ups was conducted with Dr. Yadav on 1/7/25, and discharge was approved.   79-year-old woman with PMHx of myelodysplastic syndrome (MDS) followed by Dr. Wade (requiring periodic transfusions for anemia), DM, HTN, CKD 2, and multiple recent admissions for a b/l subdural hematomas (SDH) with mass effect and midline shift (status-post evacuation and middle meningeal artery embolization complicated by a leaking craniotomy site and concern for status epilepticus) presented to the ED for anemia on 12/31. Patient was at [facility] center was found to be anemic to 5.6 being sent to ED for transfusion. She is nonverbal, bedbound, and has a PEG tube.    #this is a STAR patient - GOC done  # pt is immunocompromised 2/2 age, MDS, DM    # pt is non-verbal and has functional quadriplegia. Prior to arrival to ED on 10/8/24 for being found unresponsive on floor, pt was independent of all ADLs, lived alone, took care of herself well and had no dementia.    # Fever and leukocytosis - suspect asp PNA/pneumonitis  RVP neg  BCx neg  UCx neg - doubt UTI  MRSA nares: neg  CXR w/ opac b/l  hx aspiration  c/w NC O2 to keep sat > 92% (decr to 1L)  abx: zosyn 3.375gm iv q8 - last day today 1/10  no need for abx on discharge     # hypervolemic hypernatremia - need to tx both  BP on higher side  lasix 40mg iv q12   IVFs: D5 50/hr  free water added after feeds  always correct Na for glucose  has loose BM - avoid laxatives  try imodium 2mg PEG q24    # DM w/ hyperglycemia  Diet: on nepro w/ carb steady via PEG  FS 4x/day before feeds   goal for -180  incr lantus 16u HS  c/w lisp 3u before each feed (4x/day)    #HTN   - c/w amlodipine 10mg daily   - start labetalol 200mg BID via peg     # Unstageable sacral ulcer 4x7cm  WOCN noted:  Cleanse wound to sacrococcygeal region with Vashe wound cleanser. Pat dry, apply Medihoney, cover with xeroform and abdominal pad twice a day and prn for soiling.     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)  s/p two units pRBCs  hgb stable  no overt bleeding    # marked hyperkalemia - resolved    # CKD2 (cr at baseline)  Cr 0.9 - at baseline    # Chronic bilateral effusions   pro-BNP 9727, significantly increased from 1439 in Oct 2024  lasix as above    # HO SDH   sp evacuation and middle meningeal artery embolization complicated by a leaking craniotomy site and concern for status epilepticus  c/w home antiepileptics    # chr ocampo  failed TOV    # minor LFT abnl - prob 2/2 illness and meds    Discussion of discharge plan of care, including discharge diagnoses, medication reconciliation, and follow-ups was conducted with Dr. Yadav on 1/7/25, and discharge was approved.

## 2025-01-07 NOTE — DISCHARGE NOTE PROVIDER - CARE PROVIDER_API CALL
David Torres  Internal Medicine  [address] [place] [address]  Phone: [phone]  Fax: [phone]  Follow Up Time: 1 week

## 2025-01-08 PROBLEM — C94.6 MDS/MPN (MYELODYSPLASTIC/MYELOPROLIFERATIVE NEOPLASMS): Status: ACTIVE | Noted: 2020-07-20

## 2025-01-08 LAB
CULTURE RESULTS: SIGNIFICANT CHANGE UP
SPECIMEN SOURCE: SIGNIFICANT CHANGE UP

## 2025-01-08 NOTE — PROGRESS NOTE ADULT - SUBJECTIVE AND OBJECTIVE BOX
SIMONA KUHN  79y  Female  ***My note supersedes ALL resident notes that I sign.  My corrections for their notes are in my note.***    I can be reached directly via Teams or my office number is [phone] or [phone].    INTERVAL EVENTS: Here for f/u of asp PNA. Pt the same. Non-verbal and not interactive. Seems comfortable.    T(F): 99.8 (01-08-25 @ 05:00), Max: 99.8 (01-08-25 @ 05:00)  HR: 91 (01-08-25 @ 08:00) (91 - 110)  BP: 133/72 (01-08-25 @ 08:00) (133/72 - 159/71)  RR: 18 (01-08-25 @ 05:00) (18 - 18)  SpO2: 95% (01-08-25 @ 08:00) (95% - 98%)    Gen: no resp distress; non-verb; not interactive  HEENT: PERRL, mouth clr, nose clr;   Neck: no nodes, no JVD, thyroid nl  chest: rt port-a-cath  lungs: decr BS bases; no wheeze; mostly dry  hrt: s1 s2 reg, tachy, no murmur  abd: soft, NT/ND, no HS megaly; PEG - site w/ min erythema; min d/c  ext: 1+ edema in arms, tr edema in legs no c/c  neuro: not awake or alert; legs are a bit contracted  : + ocampo: yel urine; no bolld  Anal: + Dignishield w/ brn soft stool - no blood    LABS:                      7.1     (    89.6   11.26 )-----------( ---------      376      ( 08 Jan 2025 06:27 )             22.4    (    17.6     WBC Count: 11.26 K/uL (01-08-25 @ 06:27)  WBC Count: 12.23 K/uL (01-07-25 @ 07:31)  WBC Count: 15.81 K/uL (01-06-25 @ 05:41)  WBC Count: 12.90 K/uL (01-05-25 @ 07:44)  WBC Count: 13.49 K/uL (01-04-25 @ 22:34)  WBC Count: 10.58 K/uL (01-04-25 @ 07:48)    Hemoglobin: 7.1 g/dL (01-08 @ 06:27) - give 1u prbc  Hemoglobin: 7.7 g/dL (01-07 @ 07:31)  Hemoglobin: 8.3 g/dL (01-06 @ 05:41)  Hemoglobin: 8.8 g/dL (01-05 @ 07:44)  Hemoglobin: 9.1 g/dL (01-04 @ 22:34)  Hemoglobin: 8.2 g/dL (01-04 @ 07:48)    143   (   107   (   113      01-08-25 @ 06:27  ----------------------               3.3   (   23   (   56                             -----                        1.0  Ca  8.9   Mg  1.8    P   --     LFT  4.9  (  <0.2  (  69       01-08-25 @ 06:27  -------------------------  3.0  (  172  (  46    Urinalysis Basic - ( 08 Jan 2025 06:27 )    Color: x / Appearance: x / SG: x / pH: x  Gluc: 113 mg/dL / Ketone: x  / Bili: x / Urobili: x   Blood: x / Protein: x / Nitrite: x   Leuk Esterase: x / RBC: x / WBC x   Sq Epi: x / Non Sq Epi: x / Bacteria: x    CAPILLARY BLOOD GLUCOSE  POCT Blood Glucose.: 85 (01-08-25 @ 11:40)  POCT Blood Glucose.: 137 (01-08-25 @ 07:47)  POCT Blood Glucose.: 206 (01-07-25 @ 20:49)  POCT Blood Glucose.: 179 (01-07-25 @ 18:04)  POCT Blood Glucose.: 258 (01-07-25 @ 11:28)  POCT Blood Glucose.: 290 (01-07-25 @ 09:25)  POCT Blood Glucose.: 281 (01-07-25 @ 06:42)  POCT Blood Glucose.: 204 (01-06-25 @ 17:16)    RADIOLOGY & ADDITIONAL TESTS:    MEDICATIONS:  piperacillin/tazobactam IVPB.. 3.375 Gram(s) IV Intermittent every 8 hours    amantadine Syrup 50 milliGRAM(s) Oral two times a day  amLODIPine   Tablet 10 milliGRAM(s) Oral daily  chlorhexidine 2% Cloths 1 Application(s) Topical daily  furosemide   Injectable 40 milliGRAM(s) IV Push two times a day  glucagon  Injectable 1 milliGRAM(s) IntraMuscular once  heparin   Injectable 5000 Unit(s) SubCutaneous every 12 hours  insulin glargine Injectable (LANTUS) 16 Unit(s) SubCutaneous at bedtime  insulin glargine Injectable (LANTUS) 5 Unit(s) SubCutaneous at bedtime  insulin lispro (ADMELOG) corrective regimen sliding scale   SubCutaneous four times a day before meals  insulin lispro Injectable (ADMELOG) 3 Unit(s) SubCutaneous four times a day before meals  labetalol 100 milliGRAM(s) Oral every 8 hours  lacosamide Solution 200 milliGRAM(s) Oral two times a day  levETIRAcetam  Solution 1500 milliGRAM(s) Oral two times a day  loperamide Liquid 2 milliGRAM(s) Oral daily  pantoprazole   Suspension 40 milliGRAM(s) Oral daily  potassium chloride  20 mEq/100 mL IVPB 20 milliEquivalent(s) IV Intermittent every 2 hours

## 2025-01-08 NOTE — PROGRESS NOTE ADULT - SUBJECTIVE AND OBJECTIVE BOX
SUBJECTIVE/OVERNIGHT EVENTS  Today is hospital day 8d. This morning patient was seen and examined at bedside, resting comfortably in bed. No acute or major events overnight.    HOSPITAL COURSE  Day 1:   Day 2:   Day 3:     CODE STATUS:    FAMILY COMMUNICATION  Contact date:  Name of person contacted:  Relationship to patient:  Communication details:    MEDICATIONS  STANDING MEDICATIONS  amantadine Syrup 50 milliGRAM(s) Oral two times a day  amLODIPine   Tablet 10 milliGRAM(s) Oral daily  chlorhexidine 2% Cloths 1 Application(s) Topical daily  furosemide   Injectable 40 milliGRAM(s) IV Push two times a day  glucagon  Injectable 1 milliGRAM(s) IntraMuscular once  heparin   Injectable 5000 Unit(s) SubCutaneous every 12 hours  insulin glargine Injectable (LANTUS) 16 Unit(s) SubCutaneous at bedtime  insulin glargine Injectable (LANTUS) 5 Unit(s) SubCutaneous at bedtime  insulin lispro (ADMELOG) corrective regimen sliding scale   SubCutaneous four times a day before meals  insulin lispro Injectable (ADMELOG) 3 Unit(s) SubCutaneous four times a day before meals  labetalol 100 milliGRAM(s) Oral every 8 hours  lacosamide Solution 200 milliGRAM(s) Oral two times a day  levETIRAcetam  Solution 1500 milliGRAM(s) Oral two times a day  loperamide Liquid 2 milliGRAM(s) Oral daily  pantoprazole   Suspension 40 milliGRAM(s) Oral daily  piperacillin/tazobactam IVPB.. 3.375 Gram(s) IV Intermittent every 8 hours  potassium chloride  20 mEq/100 mL IVPB 20 milliEquivalent(s) IV Intermittent every 2 hours    PRN MEDICATIONS    VITALS  T(F): 99.8 (01-08-25 @ 05:00), Max: 99.8 (01-08-25 @ 05:00)  HR: 91 (01-08-25 @ 08:00) (91 - 110)  BP: 133/72 (01-08-25 @ 08:00) (133/72 - 159/71)  RR: 18 (01-08-25 @ 05:00) (18 - 18)  SpO2: 95% (01-08-25 @ 08:00) (95% - 98%)  POCT Blood Glucose.: 137 mg/dL (01-08-25 @ 07:47)  POCT Blood Glucose.: 206 mg/dL (01-07-25 @ 20:49)  POCT Blood Glucose.: 179 mg/dL (01-07-25 @ 18:04)  POCT Blood Glucose.: 258 mg/dL (01-07-25 @ 11:28)    PHYSICAL EXAM  GENERAL  (  ) NAD, lying in bed comfortably     (  ) obtunded     (  ) lethargic     (  ) somnolent    HEAD  (  ) Atraumatic     (  ) hematoma     (  ) laceration (specify location:       )     NECK  (  ) Supple     (  ) neck stiffness     (  ) nuchal rigidity     (  )  no JVD     (  ) JVD present ( -- cm)    HEART  Rate -->  (  ) normal rate    (  ) bradycardic    (  ) tachycardic  Rhythm -->  (  ) regular    (  ) regularly irregular    (  ) irregularly irregular  Murmurs -->  (  ) normal s1/s2    (  ) systolic murmur    (  ) diastolic murmur    (  ) continuous murmur     (  ) S3 present    (  ) S4 present    LUNGS  (  )Unlabored respirations     (  ) tachypnea  (  ) B/L air entry     (  ) decreased breath sounds in:  (location     )    (  ) no adventitious sound     (  ) crackles     (  ) wheezing      (  ) rhonchi      (specify location:       )  (  ) chest wall tenderness (specify location:       )    ABDOMEN  (  ) Soft     (  ) tense   |   (  ) nondistended     (  ) distended   |   (  ) +BS     (  ) hypoactive bowel sounds     (  ) hyperactive bowel sounds  (  ) nontender     (  ) RUQ tenderness     (  ) RLQ tenderness     (  ) LLQ tenderness     (  ) epigastric tenderness     (  ) diffuse tenderness  (  ) Splenomegaly      (  ) Hepatomegaly      (  ) Jaundice     (  ) ecchymosis     EXTREMITIES  (  ) Normal     (  ) Rash     (  ) ecchymosis     (  ) varicose veins      (  ) pitting edema     (  ) non-pitting edema   (  ) ulceration     (  ) gangrene:     (location:     )    NERVOUS SYSTEM  (  ) A&Ox3     (  ) confused     (  ) lethargic  CN II-XII:     (  ) Intact     (  ) focal deficits  (Specify:     )   Upper extremities:     (  ) strength X/5     (  ) focal deficit (specify:    )  Lower extremities:     (  ) strength  X/5    (  ) focal deficit (specify:    )    SKIN  (  ) No rashes or lesions     (  ) maculopapular rash     (  ) pustules     (  ) vesicles     (  ) ulcer     (  ) ecchymosis     (specify location:     )      LABS             7.1    11.26 )-----------( 376      ( 01-08-25 @ 06:27 )             22.4     143  |  107  |  56  -------------------------<  113   01-08-25 @ 06:27  3.3  |  23  |  1.0    Ca      8.9     01-08-25 @ 06:27  Mg     1.8     01-08-25 @ 06:27    TPro  4.9  /  Alb  3.0  /  TBili  <0.2  /  DBili  x   /  AST  69  /  ALT  46  /  AlkPhos  172  /  GGT  x     01-08-25 @ 06:27        Urinalysis Basic - ( 08 Jan 2025 06:27 )    Color: x / Appearance: x / SG: x / pH: x  Gluc: 113 mg/dL / Ketone: x  / Bili: x / Urobili: x   Blood: x / Protein: x / Nitrite: x   Leuk Esterase: x / RBC: x / WBC x   Sq Epi: x / Non Sq Epi: x / Bacteria: x          IMAGING SUBJECTIVE/OVERNIGHT EVENTS  Today is hospital day 8d. This morning patient was seen and examined at bedside, resting comfortably in bed. No acute or major events overnight.    MEDICATIONS  STANDING MEDICATIONS  amantadine Syrup 50 milliGRAM(s) Oral two times a day  amLODIPine   Tablet 10 milliGRAM(s) Oral daily  chlorhexidine 2% Cloths 1 Application(s) Topical daily  furosemide   Injectable 40 milliGRAM(s) IV Push two times a day  glucagon  Injectable 1 milliGRAM(s) IntraMuscular once  heparin   Injectable 5000 Unit(s) SubCutaneous every 12 hours  insulin glargine Injectable (LANTUS) 16 Unit(s) SubCutaneous at bedtime  insulin glargine Injectable (LANTUS) 5 Unit(s) SubCutaneous at bedtime  insulin lispro (ADMELOG) corrective regimen sliding scale   SubCutaneous four times a day before meals  insulin lispro Injectable (ADMELOG) 3 Unit(s) SubCutaneous four times a day before meals  labetalol 100 milliGRAM(s) Oral every 8 hours  lacosamide Solution 200 milliGRAM(s) Oral two times a day  levETIRAcetam  Solution 1500 milliGRAM(s) Oral two times a day  loperamide Liquid 2 milliGRAM(s) Oral daily  pantoprazole   Suspension 40 milliGRAM(s) Oral daily  piperacillin/tazobactam IVPB.. 3.375 Gram(s) IV Intermittent every 8 hours  potassium chloride  20 mEq/100 mL IVPB 20 milliEquivalent(s) IV Intermittent every 2 hours    PRN MEDICATIONS    VITALS  T(F): 99.8 (01-08-25 @ 05:00), Max: 99.8 (01-08-25 @ 05:00)  HR: 91 (01-08-25 @ 08:00) (91 - 110)  BP: 133/72 (01-08-25 @ 08:00) (133/72 - 159/71)  RR: 18 (01-08-25 @ 05:00) (18 - 18)  SpO2: 95% (01-08-25 @ 08:00) (95% - 98%)  POCT Blood Glucose.: 137 mg/dL (01-08-25 @ 07:47)  POCT Blood Glucose.: 206 mg/dL (01-07-25 @ 20:49)  POCT Blood Glucose.: 179 mg/dL (01-07-25 @ 18:04)  POCT Blood Glucose.: 258 mg/dL (01-07-25 @ 11:28)    PHYSICAL EXAM  GENERAL  ( + ) NAD, lying in bed comfortably     (  ) obtunded     (  ) lethargic     (  ) somnolent    HEAD  ( + ) Atraumatic     (  ) hematoma     (  ) laceration (specify location:       )     NECK  ( + ) Supple     (  ) neck stiffness     (  ) nuchal rigidity     (  )  no JVD     (  ) JVD present ( -- cm)    HEART  Rate -->  (+  ) normal rate    (  ) bradycardic    (  ) tachycardic  Rhythm -->  ( + ) regular    (  ) regularly irregular    (  ) irregularly irregular  Murmurs -->  (  ) normal s1/s2    (  ) systolic murmur    (  ) diastolic murmur    (  ) continuous murmur     (  ) S3 present    (  ) S4 present    LUNGS  ( + )Unlabored respirations     (  ) tachypnea  ( + ) B/L air entry     (  ) decreased breath sounds in:  (location     )    (  ) no adventitious sound     (  ) crackles     (  ) wheezing      (  ) rhonchi      (specify location:       )  (  ) chest wall tenderness (specify location:       )    ABDOMEN  ( + ) Soft     (  ) tense   |   (  ) nondistended     (  ) distended   |   (  ) +BS     (  ) hypoactive bowel sounds     (  ) hyperactive bowel sounds  (+  ) nontender     (  ) RUQ tenderness     (  ) RLQ tenderness     (  ) LLQ tenderness     (  ) epigastric tenderness     (  ) diffuse tenderness  (  ) Splenomegaly      (  ) Hepatomegaly      (  ) Jaundice     (  ) ecchymosis     EXTREMITIES  (+  ) edema (  ) Rash     (  ) ecchymosis     (  ) varicose veins      (  ) pitting edema     (  ) non-pitting edema   (  ) ulceration     (  ) gangrene:     (location:     )    NERVOUS SYSTEM  ( + ) A&O1    (  ) confused     (  ) lethargic  CN II-XII:     (  ) Intact     (  ) focal deficits  (Specify:     )   Upper extremities:     (  ) strength X/5     (  ) focal deficit (specify:    )  Lower extremities:     (  ) strength  X/5    (  ) focal deficit (specify:    )    SKIN  (+ ) No rashes or lesions     (  ) maculopapular rash     (  ) pustules     (  ) vesicles     (  ) ulcer     (  ) ecchymosis     (specify location:     )      LABS             7.1    11.26 )-----------( 376      ( 01-08-25 @ 06:27 )             22.4     143  |  107  |  56  -------------------------<  113   01-08-25 @ 06:27  3.3  |  23  |  1.0    Ca      8.9     01-08-25 @ 06:27  Mg     1.8     01-08-25 @ 06:27    TPro  4.9  /  Alb  3.0  /  TBili  <0.2  /  DBili  x   /  AST  69  /  ALT  46  /  AlkPhos  172  /  GGT  x     01-08-25 @ 06:27    Urinalysis Basic - ( 08 Jan 2025 06:27 )    Color: x / Appearance: x / SG: x / pH: x  Gluc: 113 mg/dL / Ketone: x  / Bili: x / Urobili: x   Blood: x / Protein: x / Nitrite: x   Leuk Esterase: x / RBC: x / WBC x   Sq Epi: x / Non Sq Epi: x / Bacteria: x

## 2025-01-08 NOTE — DISCHARGE NOTE PROVIDER - NS AS DC PROVIDER CONTACT Y/N MULTI
Topical Steroids Counseling: I discussed with patient/parent that prolonged use of topical steroids can result in the increased appearance of superficial blood vessels (telangiectasias), lightening (hypopigmentation) and thinning of the skin (atrophy).  Patient/parent understands to avoid using high potency steroids in skin folds, the groin or the face.  The patient/parent verbalized understanding of the proper use and possible adverse effects of topical steroids.  All of the patient's/parent's questions and concerns were addressed. Detail Level: Zone Yes

## 2025-01-08 NOTE — PROGRESS NOTE ADULT - ASSESSMENT
79-year-old woman with PMHx of myelodysplastic syndrome (MDS) followed by Dr. Wade (requiring periodic transfusions for anemia), DM, HTN, CKD 2, and multiple recent admissions for a b/l subdural hematomas (SDH) with mass effect and midline shift (status-post evacuation and middle meningeal artery embolization complicated by a leaking craniotomy site and concern for status epilepticus) presented to the ED for anemia on 12/31. Patient was at cancer center was found to be anemic to 5.6 being sent to ED for transfusion. She is nonverbal, bedbound, and has a PEG tube.    # This is a STAR patient - GOC last done by [facility] 1/6    # pt is immunocompromised 2/2 age, MDS, DM    # pt is non-verbal and has functional quadriplegia. Prior to arrival to ED on 10/8/24 for being found unresponsive on floor, pt was independent of all ADLs, lived alone, took care of herself well and had no dementia.    # Fever and leukocytosis - suspect asp PNA/pneumonitis  RVP neg  BCx neg  UCx neg - doubt UTI  MRSA nares: neg  CXR w/ opac b/l  hx aspiration  wbc improving  c/w NC O2 to keep sat > 92% (decr to 1L)  ID eval  abx: zosyn 3.375gm iv q8 - likely tx 8-10 days (1/1-1/11 or less) - will finish w/ Augmentin 400/57 susp 10cc (800mg) via PEG q12  cbc q24    # hypervolemic hypernatremia - need to tx both - both are improved  BP on higher side  make lasix liq 40mg peg q24  start KCL solution 20 meq peg q24  DAILY WTS: f/u RN  IVFs: off  free water added after feeds (400cc PEG q6 2 hrs after each feed)  always correct Na for glucose  fix DM (causing osmotic diuresis w/ dehydration)  has loose BM - avoid laxatives  imodium 2mg PEG q24  BMP q24    # hypokalemia  K riders today  KCL via PEG kel    # HTN  amlodipine 10mg peg q24  labetalol 100mg po q8    # DM w/ hyperglycemia  Diet: on nepro w/ carb steady via PEG  FS 4x/day before feeds (NOT ac/hs)  goal for -180  c/w lantus 16u HS  incr lisp 4u before each feed (4x/day)    # Unstageable sacral ulcer 4x7cm  WOCN noted:  Cleanse wound to sacrococcygeal region with Vashe wound cleanser. Pat dry, apply Medihoney, cover with xeroform and abdominal pad twice a day and prn for soiling.     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 Guaze can go OVER bumper and be taped in place    # chr ocampo  failed TOV    # minor LFT abnl - prob 2/2 illness and meds    # DVT ppx: Hep sc q12    # GI ppx: PPI PEG    # Activity: bedbound; func quad    # Code: DNR/DNI    Dispo: c/w ocampo; c/w diginishield (stop on d/c); c/w imodium; fix DM; free water; po lasix; daily wts; sacral care; iv abx  eventually, pt will go to NH on hospice - f/u CM - stable for d/c, if accepted by hospice    Prog is poor.

## 2025-01-08 NOTE — PROGRESS NOTE ADULT - ASSESSMENT
79-year-old woman with PMHx of myelodysplastic syndrome (MDS) followed by Dr. Wade (requiring periodic transfusions for anemia), DM, HTN, CKD 2, and multiple recent admissions for a b/l subdural hematomas (SDH) with mass effect and midline shift (status-post evacuation and middle meningeal artery embolization complicated by a leaking craniotomy site and concern for status epilepticus) presented to the ED for anemia on 12/31. Patient was at cancer center was found to be anemic to 5.6 being sent to ED for transfusion. She is nonverbal, bedbound, and has a PEG tube.    # This is a STAR patient - GOC done  # pt is immunocompromised 2/2 age, MDS, DM  # pt is non-verbal and has functional quadriplegia. Prior to arrival to ED on 10/8/24 for being found unresponsive on floor, pt was independent of all ADLs, lived alone, took care of herself well and had no dementia.  # Fever and leukocytosis - suspect asp PNA/pneumonitis  RVP neg  BCx neg  UCx neg - doubt UTI  MRSA nares: neg  CXR w/ opac b/l  hx aspiration  wbc improving  wean O2  ID eval  abx: zosyn 3.375gm iv q8 - likely tx 10 days (1/1-1/11) - will dc on liquid Augmentin   cbc q24    # hypervolemic hypernatremia - need to tx both  BP on higher side  lasix 40mg iv q12 for today  daily wts  IVFs: off  incr free water added after feeds (400cc PEG q6 2 hrs after each feed)  always correct Na for glucose  fix DM (causing osmotic diuresis w/ dehydration)  has loose BM - avoid laxatives  imodium 2mg PEG q24  BMP q24    # HTN  amlodipine 10mg peg q24  start labetalol 100mg po q8    # DM w/ hyperglycemia  Diet: on nepro w/ carb steady via PEG  FS 4x/day before feeds (NOT ac/hs)  goal for -180  incr lantus 16u HS, give 5u lantus extra this AM  c/w lisp 3u before each feed (4x/day)    # Unstageable sacral ulcer 4x7cm  WOCN noted:  Cleanse wound to sacrococcygeal region with Vashe wound cleanser. Pat dry, apply Medihoney, cover with xeroform and abdominal pad twice a day and prn for soiling.     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 Guaze can go OVER bumper and be taped in place    # chr ocampo  failed TOV    # minor LFT abnl - prob 2/2 illness and meds    # DVT ppx: Hep sc q12  # GI ppx: PPI PEG  # Activity: bedbound; func quad  # Code: DNR/DNI  Pending: placement

## 2025-01-09 NOTE — CHART NOTE - NSCHARTNOTEFT_GEN_A_CORE
Registered Dietitian Follow-Up     Patient Profile Reviewed                           Yes [x]   No []     Nutrition History Previously Obtained        Yes []  No [x]       Pertinent Subjective Information: Pt tube feed dependent; at NH was receiving EN regimen of Glucerna 1.2 @ 60 mL/hr running from 6pm-2pm with auto free water flushes of 25 mL/hr. This provides 1440 kcal, 72 g protein, 1466 mL total free water (966 mL free water from formula). UBW reported to be 61.4 kg with no known h/o unintentional wt loss reported.     Pertinent Medical Interventions: Pt presented to the ED for anemia on 12/31. Patient was at [facility] was found to be anemic to 5.6 being sent to ED for transfusion. She is nonverbal, bedbound, and has a PEG tube.    Fever and leukocytosis noted. Hypervolemic hypernatremia - both noted improved as per progress notes. Hypokalemia - KCl via PEG. Anemia noted likely d/t MDS. CKD 2 noted. Chronic bilateral effusions noted.    PMH includes myelodysplastic syndrome, DM, HTN, CKD 2, and multiple recent admissions for a b/l subdural hematomas (SDH) with mass effect and midline shift (status-post evacuation and middle meningeal artery embolization complicated by a leaking craniotomy site and concern for status epilepticus).     Diet order: Nepro goal rate 275 mL q6hrs with 75 mL pre & post flushes with each feed. Tube feed regimen at goal to provide 1980 kcal, 89 g protein, 1403 mL free H2O.    Anthropometrics:  Ht: 154.9 cm.  Dosing wt: 65.9 kg (1/1)  BMI: 27.5  IBW: 47.7 kg.    Further daily wts:  65.9 kg (1/1), 67.2 kg (1/2)    Wt fluctuations observed suspected to be related to fluid shifts. BMI calculated using dosing wt 65.9 kg (1/1)    MEDICATIONS  (STANDING):  amantadine Syrup 50 milliGRAM(s) Oral two times a day  artificial  tears Solution 1 Drop(s) Both EYES every 8 hours  chlorhexidine 2% Cloths 1 Application(s) Topical daily  furosemide Solution 20 milliGRAM(s) Oral daily  glucagon  Injectable 1 milliGRAM(s) IntraMuscular once  heparin   Injectable 5000 Unit(s) SubCutaneous every 12 hours  insulin glargine Injectable (LANTUS) 16 Unit(s) SubCutaneous at bedtime  insulin lispro (ADMELOG) corrective regimen sliding scale   SubCutaneous four times a day before meals  insulin lispro Injectable (ADMELOG) 4 Unit(s) SubCutaneous four times a day before meals  labetalol 100 milliGRAM(s) Oral every 8 hours  lacosamide Solution 200 milliGRAM(s) Oral two times a day  levETIRAcetam  Solution 1500 milliGRAM(s) Oral two times a day  loperamide Liquid 2 milliGRAM(s) Oral daily  pantoprazole   Suspension 40 milliGRAM(s) Oral daily  piperacillin/tazobactam IVPB.. 3.375 Gram(s) IV Intermittent every 8 hours  potassium chloride   Solution 20 milliEquivalent(s) Oral daily    Pertinent Labs: 01-09 @ 09:35: Na 140, BUN 59[H], Cr 1.1, BG 86, K+ 3.8, Mg 1.8, Alk Phos 159[H], ALT/SGPT 41, AST/SGOT 46[H]    Finger Sticks:  POCT Blood Glucose.: 174 mg/dL (01-09 @ 21:12)  POCT Blood Glucose.: 184 mg/dL (01-09 @ 16:38)  POCT Blood Glucose.: 200 mg/dL (01-09 @ 11:27)  POCT Blood Glucose.: 147 mg/dL (01-09 @ 07:44)    Latest PO4-4.9 (1/1)    Physical Findings:  - Appearance: lethargic at time of RD visit; no signs of significant muscle wasting/subcutaneous fat loss observed upon nutrition focused physical exam.  - GI function: last BM 1/8; no nausea/vomiting reported. No abd distention noted at this time. Loose BMs reported per RN.  - Tubes: PEG  - Oral/Mouth cavity: NPO  - Skin: stage 3 pressure injury to sacral spine, suspected DTI to B/L buttocks  - Edema: 1+ generalized edema noted     Nutrition Requirements:  Weight Used: 65.9 kg as per 1/2 RD assessment     Estimated Energy Needs    Continue [x]  Adjust []  1647 - 1977 kcal/day (25-30 kcal/kg)         Estimated Protein Needs    Continue [x]  Adjust []  79 - 99 gm/day (1.2 - 1.5 gm/kg)        Estimated Fluid Needs        Continue []  Adjust [x]  1647 mL/day (25 mL/kg ABW)     Nutrient Intake: Pt receiving tube feed regimen at goal rate at this time; per RN, pt is tolerating current tube feed regimen at this time. Tube feed regimen at goal to provide 100% kcal & 100% protein needs at goal. Will recommend change to Glucerna 1.2 as currently latest PO4 & K+ levels are WDL. Will continue to monitor tube feed tolerance.    [x] Previous Nutrition Diagnosis: Increased nutrient needs            [x] Ongoing          [] Resolved    Nutrition Education: Reviewed tube feed regimen.     Goal/Expected Outcome: Pt to demonstrate tolerance to nutrition support regimen, meeting >85% & <105% of estimated nutrient needs over next 3-5 days.    Pt at high nutrition risk.     Indicator/Monitoring: Skin, labs, BM, wt, nutrition focused physical findings, body composition, GI, tube feed tolerance, diet order.    Recommendation: Change tube feed regimen to Glucerna 1.2 at goal rate 360 mL q6hrs with 50 mL pre & post flushes q6hrs. Tube feed regimen at goal provides 1728 kcal, 86 g protein, 1566 mL free H2O. Registered Dietitian Follow-Up     Patient Profile Reviewed                           Yes [x]   No []     Nutrition History Previously Obtained        Yes []  No [x]       Pertinent Subjective Information: Pt tube feed dependent; at NH was receiving EN regimen of Glucerna 1.2 @ 60 mL/hr running from 6pm-2pm with auto free water flushes of 25 mL/hr. This provides 1440 kcal, 72 g protein, 1466 mL total free water (966 mL free water from formula). UBW reported to be 61.4 kg with no known h/o unintentional wt loss reported.     Pertinent Medical Interventions: Pt presented to the ED for anemia on 12/31. Patient was at [facility] was found to be anemic to 5.6 being sent to ED for transfusion. She is nonverbal, bedbound, and has a PEG tube.    Fever and leukocytosis noted. Hypervolemic hypernatremia - both noted improved as per progress notes. Hypokalemia - KCl via PEG. Anemia noted likely d/t MDS. CKD 2 noted. Chronic bilateral effusions noted.    PMH includes myelodysplastic syndrome, DM, HTN, CKD 2, and multiple recent admissions for a b/l subdural hematomas (SDH) with mass effect and midline shift (status-post evacuation and middle meningeal artery embolization complicated by a leaking craniotomy site and concern for status epilepticus).     Diet order: Nepro goal rate 275 mL q6hrs with 75 mL pre & post flushes with each feed. Tube feed regimen at goal to provide 1980 kcal, 89 g protein, 1403 mL free H2O.    Anthropometrics:  Ht: 154.9 cm.  Dosing wt: 65.9 kg (1/1)  BMI: 27.5  IBW: 47.7 kg.    Further daily wts:  65.9 kg (1/1), 67.2 kg (1/2)    Wt fluctuations observed suspected to be related to fluid shifts. BMI calculated using dosing wt 65.9 kg (1/1)    MEDICATIONS  (STANDING):  amantadine Syrup 50 milliGRAM(s) Oral two times a day  artificial  tears Solution 1 Drop(s) Both EYES every 8 hours  chlorhexidine 2% Cloths 1 Application(s) Topical daily  furosemide Solution 20 milliGRAM(s) Oral daily  glucagon  Injectable 1 milliGRAM(s) IntraMuscular once  heparin   Injectable 5000 Unit(s) SubCutaneous every 12 hours  insulin glargine Injectable (LANTUS) 16 Unit(s) SubCutaneous at bedtime  insulin lispro (ADMELOG) corrective regimen sliding scale   SubCutaneous four times a day before meals  insulin lispro Injectable (ADMELOG) 4 Unit(s) SubCutaneous four times a day before meals  labetalol 100 milliGRAM(s) Oral every 8 hours  lacosamide Solution 200 milliGRAM(s) Oral two times a day  levETIRAcetam  Solution 1500 milliGRAM(s) Oral two times a day  loperamide Liquid 2 milliGRAM(s) Oral daily  pantoprazole   Suspension 40 milliGRAM(s) Oral daily  piperacillin/tazobactam IVPB.. 3.375 Gram(s) IV Intermittent every 8 hours  potassium chloride   Solution 20 milliEquivalent(s) Oral daily    Pertinent Labs: 01-09 @ 09:35: Na 140, BUN 59[H], Cr 1.1, BG 86, K+ 3.8, Mg 1.8, Alk Phos 159[H], ALT/SGPT 41, AST/SGOT 46[H]    Finger Sticks:  POCT Blood Glucose.: 174 mg/dL (01-09 @ 21:12)  POCT Blood Glucose.: 184 mg/dL (01-09 @ 16:38)  POCT Blood Glucose.: 200 mg/dL (01-09 @ 11:27)  POCT Blood Glucose.: 147 mg/dL (01-09 @ 07:44)    Latest PO4-4.9 (1/1)    Physical Findings:  - Appearance: lethargic at time of RD visit; no signs of significant muscle wasting/subcutaneous fat loss observed upon nutrition focused physical exam.  - GI function: last BM 1/8; no nausea/vomiting reported. No abd distention noted at this time. Loose BMs reported per RN.  - Tubes: PEG  - Oral/Mouth cavity: NPO  - Skin: stage 3 pressure injury to sacral spine, suspected DTI to B/L buttocks  - Edema: 1+ generalized edema noted     Nutrition Requirements:  Weight Used: 65.9 kg as per 1/2 RD assessment     Estimated Energy Needs    Continue [x]  Adjust []  1647 - 1977 kcal/day (25-30 kcal/kg)         Estimated Protein Needs    Continue [x]  Adjust []  79 - 99 gm/day (1.2 - 1.5 gm/kg)        Estimated Fluid Needs        Continue []  Adjust [x]  1647 mL/day (25 mL/kg ABW)     Nutrient Intake: Pt receiving tube feed regimen at goal rate at this time; per RN, pt is tolerating current tube feed regimen at this time. Tube feed regimen at goal to provide 100% kcal & 100% protein needs at goal. Will recommend change to Glucerna 1.2 as currently latest PO4 & K+ levels are WDL. Will continue to monitor tube feed tolerance.    [x] Previous Nutrition Diagnosis: Increased nutrient needs            [x] Ongoing          [] Resolved    Nutrition Education: Reviewed tube feed regimen.     Goal/Expected Outcome: Pt to demonstrate tolerance to nutrition support regimen, meeting >85% & <105% of estimated nutrient needs over next 3-5 days.    Pt at high nutrition risk.     Indicator/Monitoring: Skin, labs, BM, wt, nutrition focused physical findings, body composition, GI, tube feed tolerance, diet order.    Recommendation: Change tube feed regimen to Glucerna 1.2 at goal rate 360 mL q6hrs with 50 mL pre & post flushes q6hrs. Tube feed regimen at goal provides 1728 kcal, 86 g protein, 1566 mL free H2O. While loose BMs persist, provide 2 packets Banatrol TF; d/c Banatrol once pt no longer having loose BMs.

## 2025-01-09 NOTE — PROGRESS NOTE ADULT - SUBJECTIVE AND OBJECTIVE BOX
SIMONA KUHN  79y  Female  ***My note supersedes ALL resident notes that I sign.  My corrections for their notes are in my note.***    I can be reached directly via Teams or my office number is [phone] or [phone].    INTERVAL EVENTS: Here for f/u of asp PNA. Pt has slight cough per RN - not coughing on my exam. Has a fair amt (700-800cc) of liquid stool in dignishield bag.     T(F): 98.5 (01-09-25 @ 12:00), Max: 98.5 (01-09-25 @ 12:00)  HR: 91 (01-09-25 @ 12:00) (91 - 102)  BP: 146/67 (01-09-25 @ 12:00) (146/67 - 174/73)  RR: 18 (01-09-25 @ 12:00) (18 - 18)  SpO2: 98% (01-09-25 @ 12:00) (98% - 98%)    Gen: no resp distress; non-verb; not interactive  HEENT: PERRL, mouth clr, nose clr;   Neck: no nodes, no JVD, thyroid nl  chest: rt port-a-cath  lungs: decr BS bases; no wheeze; mostly dry  hrt: s1 s2 reg, tachy, no murmur  abd: soft, NT/ND, no HS megaly; PEG - site w/ min erythema; min d/c  ext: 1+ edema in arms, tr edema in legs no c/c  neuro: not awake or alert; legs are a bit contracted  : + ocampo: yel urine; no bolld  Anal: + Dignishield w/ yel/brn liquid stool - no blood    LABS:                      9.4     (    86.6   10.99 )-----------( ---------      414      ( 09 Jan 2025 09:35 )             28.4    (    17.8     WBC Count: 10.99 K/uL (01-09-25 @ 09:35)  WBC Count: 11.26 K/uL (01-08-25 @ 06:27)  WBC Count: 12.23 K/uL (01-07-25 @ 07:31)  WBC Count: 15.81 K/uL (01-06-25 @ 05:41)  WBC Count: 12.90 K/uL (01-05-25 @ 07:44)  WBC Count: 13.49 K/uL (01-04-25 @ 22:34)    Hemoglobin: 9.4 g/dL (01-09 @ 09:35)  Hemoglobin: 7.1 g/dL (01-08 @ 06:27) - got 1u prbc  Hemoglobin: 7.7 g/dL (01-07 @ 07:31)  Hemoglobin: 8.3 g/dL (01-06 @ 05:41)  Hemoglobin: 8.8 g/dL (01-05 @ 07:44)  Hemoglobin: 9.1 g/dL (01-04 @ 22:34)    140   (   104   (   86      01-09-25 @ 09:35  ----------------------               3.8   (   24   (   59                             -----                        1.1  Ca  8.7   Mg  1.8    P   --     LFT  5.0  (  0.4  (  46       01-09-25 @ 09:35  -------------------------  2.8  (  159  (  41    Urinalysis Basic - ( 09 Jan 2025 09:35 )    Color: x / Appearance: x / SG: x / pH: x  Gluc: 86 mg/dL / Ketone: x  / Bili: x / Urobili: x   Blood: x / Protein: x / Nitrite: x   Leuk Esterase: x / RBC: x / WBC x   Sq Epi: x / Non Sq Epi: x / Bacteria: x    CAPILLARY BLOOD GLUCOSE  POCT Blood Glucose.: 200 (01-09-25 @ 11:27)  POCT Blood Glucose.: 147 (01-09-25 @ 07:44)  POCT Blood Glucose.: 122 (01-08-25 @ 21:15)  POCT Blood Glucose.: 80 (01-08-25 @ 16:45)  POCT Blood Glucose.: 85 (01-08-25 @ 11:40)  POCT Blood Glucose.: 137 (01-08-25 @ 07:47)  POCT Blood Glucose.: 206 (01-07-25 @ 20:49)  POCT Blood Glucose.: 179 (01-07-25 @ 18:04)    RADIOLOGY & ADDITIONAL TESTS:    MEDICATIONS:  piperacillin/tazobactam IVPB.. 3.375 Gram(s) IV Intermittent every 8 hours    amantadine Syrup 50 milliGRAM(s) Oral two times a day  artificial  tears Solution 1 Drop(s) Both EYES every 8 hours  chlorhexidine 2% Cloths 1 Application(s) Topical daily  furosemide Solution 40 milliGRAM(s) Oral daily  glucagon  Injectable 1 milliGRAM(s) IntraMuscular once  heparin   Injectable 5000 Unit(s) SubCutaneous every 12 hours  insulin glargine Injectable (LANTUS) 16 Unit(s) SubCutaneous at bedtime  insulin lispro (ADMELOG) corrective regimen sliding scale   SubCutaneous four times a day before meals  insulin lispro Injectable (ADMELOG) 4 Unit(s) SubCutaneous four times a day before meals  labetalol 100 milliGRAM(s) Oral every 8 hours  lacosamide Solution 200 milliGRAM(s) Oral two times a day  levETIRAcetam  Solution 1500 milliGRAM(s) Oral two times a day  loperamide Liquid 2 milliGRAM(s) Oral daily  pantoprazole   Suspension 40 milliGRAM(s) Oral daily  potassium chloride   Solution 20 milliEquivalent(s) Oral daily

## 2025-01-09 NOTE — PROGRESS NOTE ADULT - ASSESSMENT
79-year-old woman with PMHx of myelodysplastic syndrome (MDS) followed by Dr. Wade (requiring periodic transfusions for anemia), DM, HTN, CKD 2, and multiple recent admissions for a b/l subdural hematomas (SDH) with mass effect and midline shift (status-post evacuation and middle meningeal artery embolization complicated by a leaking craniotomy site and concern for status epilepticus) presented to the ED for anemia on 12/31. Patient was at cancer center was found to be anemic to 5.6 being sent to ED for transfusion. She is nonverbal, bedbound, and has a PEG tube.    # This is a STAR patient - GOC last done by [facility] 1/6    # pt is immunocompromised 2/2 age, MDS, DM    # pt is non-verbal and has functional quadriplegia. Prior to arrival to ED on 10/8/24 for being found unresponsive on floor, pt was independent of all ADLs, lived alone, took care of herself well and had no dementia.    # Fever and leukocytosis - suspect asp PNA/pneumonitis  RVP neg  BCx neg  UCx neg - doubt UTI  MRSA nares: neg  CXR w/ opac b/l  hx aspiration  wbc improving  c/w NC O2 to keep sat > 92% (decr to 1L)  ID eval  abx: zosyn 3.375gm iv q8 - likely tx 8-10 days (1/1-1/10) - could finish w/ Augmentin 400/57 susp 10cc (800mg) via PEG q12, if needed  cbc q24    # hypervolemic hypernatremia - need to tx both - both are improved  BP on higher side  decr lasix liq 20mg peg q24 (high BUN)  c/w KCL solution 20 meq peg q24  DAILY WTS: 145 -> 148 -> 149 lbs ->  IVFs: off  free water added after feeds (400cc PEG q6 2 hrs after each feed)  always correct Na for glucose  fix DM (causing osmotic diuresis w/ dehydration)  has loose BM - avoid laxatives  incr imodium liq 2mg PEG q12  BMP q24    # hypokalemia - better  KCL via PEG kel    # HTN  amlodipine 10mg peg q24  labetalol 100mg po q8    # DM w/ hyperglycemia  Diet: on nepro w/ carb steady via PEG  FS 4x/day before feeds (NOT ac/hs)  goal for -180  c/w lantus 16u HS  c/w lisp 4u before each feed (4x/day)    # Unstageable sacral ulcer 4x7cm  WOCN noted:  Cleanse wound to sacrococcygeal region with Vashe wound cleanser. Pat dry, apply Medihoney, cover with xeroform and abdominal pad twice a day and prn for soiling.     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)  hgb dropped a little (MDS + phlebotomy + dilutional) -> try to limit labs  no overt bleeding  s/p 1u prbc yest  keep hgb > 7    # CKD2 (cr at baseline)  Cr 0.9 - at baseline    # Chronic bilateral effusions   pro-BNP 9727, significantly increased from 1439 in Oct 2024  lasix as above    # HO SDH   sp evacuation and middle meningeal artery embolization complicated by a leaking craniotomy site and concern for status epilepticus  c/w home antiepileptics    # PEG  NO guaze UNDER PEG bumper. 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

## 2025-01-09 NOTE — PROGRESS NOTE ADULT - ASSESSMENT
79-year-old woman with PMHx of myelodysplastic syndrome (MDS) followed by Dr. Wade (requiring periodic transfusions for anemia), DM, HTN, CKD 2, and multiple recent admissions for a b/l subdural hematomas (SDH) with mass effect and midline shift (status-post evacuation and middle meningeal artery embolization complicated by a leaking craniotomy site and concern for status epilepticus) presented to the ED for anemia on 12/31. Patient was at cancer center was found to be anemic to 5.6 being sent to ED for transfusion. She is nonverbal, bedbound, and has a PEG tube.    # STAR patient - GOC last done by [facility] 1/6  # pt is immunocompromised 2/2 age, MDS, DM  # pt is non-verbal and has functional quadriplegia. Prior to arrival to ED on 10/8/24 for being found unresponsive on floor, pt was independent of all ADLs, lived alone, took care of herself well and had no dementia.    # Fever and leukocytosis - suspect asp PNA/pneumonitis  RVP neg  BCx neg  UCx neg - doubt UTI  MRSA nares: neg  CXR w/ opac b/l  hx aspiration  wbc improving  c/w NC O2 to keep sat > 92% (decr to 1L)  ID eval  abx: zosyn 3.375gm iv q8 - likely tx 8-10 days (1/1-1/11) - will finish w/ Augmentin 400/57 susp 10cc (800mg) via PEG q12  cbc q24    # hypervolemic hypernatremia - need to tx both - both are improved  BP on higher side  make lasix liq 40mg peg q24  start KCL solution 20 meq peg q24  DAILY WTS: f/u RN  IVFs: off  free water added after feeds (400cc PEG q6 2 hrs after each feed)  always correct Na for glucose  fix DM (causing osmotic diuresis w/ dehydration)  has loose BM - avoid laxatives  imodium 2mg PEG q24  BMP q24    # hypokalemia  K riders today  KCL via PEG kel    # HTN  amlodipine 10mg peg q24 in PM   labetalol 100mg po q8  monitor BP     # DM w/ hyperglycemia  Diet: on nepro w/ carb steady via PEG  FS 4x/day before feeds (NOT ac/hs)  goal for -180  c/w lantus 16u HS  incr lisp 4u before each feed (4x/day)    # Unstageable sacral ulcer 4x7cm  WOCN noted:  Cleanse wound to sacrococcygeal region with Vashe wound cleanser. Pat dry, apply Medihoney, cover with xeroform and abdominal pad twice a day and prn for soiling.     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 Guaze can go OVER bumper and be taped in place    # chr ocampo  failed TOV    # minor LFT abnl - prob 2/2 illness and meds    # DVT ppx: Hep sc q12  # GI ppx: PPI PEG  # Activity: bedbound; func quad  # Code: DNR/DNI  #Pending: NH on hospice 79-year-old woman with PMHx of myelodysplastic syndrome (MDS) followed by Dr. Wade (requiring periodic transfusions for anemia), DM, HTN, CKD 2, and multiple recent admissions for a b/l subdural hematomas (SDH) with mass effect and midline shift (status-post evacuation and middle meningeal artery embolization complicated by a leaking craniotomy site and concern for status epilepticus) presented to the ED for anemia on 12/31. Patient was at cancer center was found to be anemic to 5.6 being sent to ED for transfusion. She is nonverbal, bedbound, and has a PEG tube.    # STAR patient - GOC last done by [facility] 1/6  # pt is immunocompromised 2/2 age, MDS, DM  # pt is non-verbal and has functional quadriplegia. Prior to arrival to ED on 10/8/24 for being found unresponsive on floor, pt was independent of all ADLs, lived alone, took care of herself well and had no dementia.    # Fever and leukocytosis - suspect asp PNA/pneumonitis  RVP neg  BCx neg  UCx neg - doubt UTI  MRSA nares: neg  CXR w/ opac b/l  hx aspiration  wbc improving  c/w NC O2 to keep sat > 92% (decr to 1L)  ID eval  abx: zosyn 3.375gm iv q8 - likely tx 8-10 days (1/1-1/11) - will finish w/ Augmentin 400/57 susp 10cc (800mg) via PEG q12  cbc q24    # hypervolemic hypernatremia - need to tx both - both are improved  BP on higher side  make lasix liq 40mg peg q24  start KCL solution 20 meq peg q24  DAILY WTS: f/u RN  IVFs: off  free water added after feeds (400cc PEG q6 2 hrs after each feed)  always correct Na for glucose  fix DM (causing osmotic diuresis w/ dehydration)  has loose BM - avoid laxatives  imodium 2mg PEG q24  BMP q24    # hypokalemia  K riders today  KCL via PEG kel    # HTN  amlodipine 10mg peg q24 in PM   labetalol 100mg po q8  monitor BP     # DM w/ hyperglycemia  Diet: on nepro w/ carb steady via PEG  FS 4x/day before feeds (NOT ac/hs)  goal for -180  c/w lantus 16u HS  incr lisp 4u before each feed (4x/day)    # Unstageable sacral ulcer 4x7cm  WOCN noted:  Cleanse wound to sacrococcygeal region with Vashe wound cleanser. Pat dry, apply Medihoney, cover with xeroform and abdominal pad twice a day and prn for soiling.     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 Guaze can go OVER bumper and be taped in place    # chr ocampo  failed TOV    # minor LFT abnl - prob 2/2 illness and meds    # DVT ppx: Hep sc q12  # GI ppx: PPI PEG  # Activity: bedbound; func quad  # Code: DNR/DNI  #Pending: NH on hospice - long term placement w/ hospice, daughter Jose is in agreement w/ plan

## 2025-01-10 NOTE — PROGRESS NOTE ADULT - ASSESSMENT
79-year-old woman with PMHx of myelodysplastic syndrome (MDS) followed by Dr. Wade (requiring periodic transfusions for anemia), DM, HTN, CKD 2, and multiple recent admissions for a b/l subdural hematomas (SDH) with mass effect and midline shift (status-post evacuation and middle meningeal artery embolization complicated by a leaking craniotomy site and concern for status epilepticus) presented to the ED for anemia on 12/31. Patient was at [facility] center was found to be anemic to 5.6 being sent to ED for transfusion. She is nonverbal, bedbound, and has a PEG tube.    # This is a STAR patient - GOC last done by [facility] 1/6    # pt is immunocompromised 2/2 age, MDS, DM    # pt is non-verbal and has functional quadriplegia. Prior to arrival to ED on 10/8/24 for being found unresponsive on floor, pt was independent of all ADLs, lived alone, took care of herself well and had no dementia.    # Fever and leukocytosis - suspect asp PNA/pneumonitis  RVP neg  BCx neg  UCx neg - doubt UTI  MRSA nares: neg  CXR w/ opac b/l  hx aspiration  wbc improved  c/w NC O2 to keep sat > 92% (decr to 1L)  ID eval  abx: zosyn 3.375gm iv q8 - til 1/10 - no further abx needed beyond today or at NH (can d/c to SNF anytime, no need to wait on abx)    # hypervolemic hypernatremia - need to tx both - both are improved  BP on higher side  lasix liq 20mg peg q24 (high BUN)  c/w KCL solution 20 meq peg q24  DAILY WTS: 145 -> 148 -> 149 lbs ->  IVFs: off  free water added after feeds (400cc PEG q6 2 hrs after each feed)  always correct Na for glucose  fix DM (causing osmotic diuresis w/ dehydration)  has loose BM - avoid laxatives  c/w imodium liq 2mg PEG q12    # hypokalemia - better  KCL via PEG    # HTN  on lasix (above)  amlodipine 10mg peg q24  incr labetalol 200mg po q12    # DM w/ hyperglycemia  Diet: on nepro w/ carb steady via PEG  FS 4x/day before feeds (NOT ac/hs)  goal for -180  c/w lantus 16u HS  c/w lisp 4u before each feed (4x/day)    # Unstageable sacral ulcer 4x7cm  WOCN noted:  Cleanse wound to sacrococcygeal region with Vashe wound cleanser. Pat dry, apply Medihoney, cover with xeroform and abdominal pad twice a day and prn for soiling.     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 Guaze can go OVER bumper and taped in place    # chr ocampo for urine retention (neurogenic bladder)  failed TOV    # minor LFT abnl - prob 2/2 illness and meds    # DVT ppx: Hep sc q12    # GI ppx: PPI PEG    # Activity: bedbound; func quad    # Code: DNR/DNI    # DtrJose, is aware and agrees that pt will be going for long term hospice care at [facility]    Dispo: c/w ocampo; c/w diginishield (stop on d/c); c/w imodium; c/w free water; peg lasix/KCL; daily wts; sacral care; iv abx finish today anytime  today, pt will go to [facility] on hospice - f/u CM - stable for d/c    Prog is poor.   79-year-old woman with PMHx of myelodysplastic syndrome (MDS) followed by Dr. Wade (requiring periodic transfusions for anemia), DM, HTN, CKD 2, and multiple recent admissions for a b/l subdural hematomas (SDH) with mass effect and midline shift (status-post evacuation and middle meningeal artery embolization complicated by a leaking craniotomy site and concern for status epilepticus) presented to the ED for anemia on 12/31. Patient was at [facility] center was found to be anemic to 5.6 being sent to ED for transfusion. She is nonverbal, bedbound, and has a PEG tube.    # This is a STAR patient - GOC last done by [facility] 1/6    # pt is immunocompromised 2/2 age, MDS, DM    # pt is non-verbal and has functional quadriplegia. Prior to arrival to ED on 10/8/24 for being found unresponsive on floor, pt was independent of all ADLs, lived alone, took care of herself well and had no dementia.    # Fever and leukocytosis - suspect asp PNA/pneumonitis  RVP neg  BCx neg  UCx neg - doubt UTI  MRSA nares: neg  CXR w/ opac b/l  hx aspiration  wbc improved  c/w NC O2 to keep sat > 92% (decr to 1L)  ID eval  abx: zosyn 3.375gm iv q8 - til 1/10 - no further abx needed beyond today or at NH (can d/c to SNF anytime, no need to wait on abx)    # hypervolemic hypernatremia - need to tx both - both are improved  BP on higher side  lasix liq 20mg peg q24 (high BUN)  c/w KCL solution 20 meq peg q24  DAILY WTS: 145 -> 148 -> 149 lbs ->  IVFs: off  free water added after feeds (400cc PEG q6 2 hrs after each feed)  always correct Na for glucose  fix DM (causing osmotic diuresis w/ dehydration)  has loose BM - avoid laxatives  c/w imodium liq 2mg PEG q12    # hypokalemia - better  KCL via PEG    # HTN  on lasix (above)  amlodipine 10mg peg q24  incr labetalol 200mg po q12    # DM w/ hyperglycemia  Diet: on nepro w/ carb steady via PEG  FS 4x/day before feeds (NOT ac/hs)  goal for -180  c/w lantus 16u HS  c/w lisp 4u before each feed (4x/day)    # Unstageable sacral ulcer 4x7cm  WOCN noted:  Cleanse wound to sacrococcygeal region with Vashe wound cleanser. Pat dry, apply Medihoney, cover with xeroform and abdominal pad twice a day and prn for soiling.     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 Guaze can go OVER bumper and taped in place    # chr ocampo for urine retention (neurogenic bladder)  failed TOV    # minor LFT abnl - prob 2/2 illness and meds    # DVT ppx: Hep sc q12    # GI ppx: PPI PEG    # Activity: bedbound; func quad    # Code: DNR/DNI    # DtrJose, is aware and agrees that pt will be going for long term hospice care at [facility]    Dispo: c/w ocampo; c/w diginishield (stop on d/c); c/w imodium; c/w free water; peg lasix/KCL; daily wts; sacral care; iv abx finish today anytime  eventually, pt will go to [facility] on hospice - f/u CM - stable for d/c (await auth)    Prog is poor.

## 2025-01-10 NOTE — PHYSICAL THERAPY INITIAL EVALUATION ADULT - GENERAL OBSERVATIONS, REHAB EVAL
PT IE/ d/c  [id number]. Chart reviewed and case discussed with NICOLAS Russ. Pt encountered semi-reclined in bed. + IV lock, + PEG, + dignishield, + ocampo, + B CAIR boots in place. pt appears to be alert however non  verbal and had difficulty following commands.

## 2025-01-10 NOTE — PROGRESS NOTE ADULT - SUBJECTIVE AND OBJECTIVE BOX
SIMONA KUHN  79y  Female  ***My note supersedes ALL resident notes that I sign.  My corrections for their notes are in my note.***    I can be reached directly via Teams or my office number is [phone] or [phone].    INTERVAL EVENTS: Here for f/u of asp PNA. Pt had temp to 100.1, which went back to 99.2, so no fevers. RN sees occ cough, but resident and I have not seen any. Pt is on RA.    T(F): 99.2 (01-10-25 @ 13:00), Max: 99.3 (01-10-25 @ 11:00)  HR: 117 (01-10-25 @ 13:00) (89 - 117)  BP: 188/70 (01-10-25 @ 13:00) (159/75 - 188/70)  RR: 18 (01-10-25 @ 13:00) (18 - 18)  SpO2: 94% (01-10-25 @ 13:00) (94% - 99%)    Gen: no resp distress; non-verb; not interactive  HEENT: PERRL, mouth clr, nose clr;   Neck: no nodes, no JVD, thyroid nl  chest: rt port-a-cath  lungs: decr BS bases; no wheeze; mostly dry  hrt: s1 s2 reg, tachy, no murmur  abd: soft, NT/ND, no HS megaly; PEG - site w/ min erythema; min d/c  ext: 1+ edema in arms, tr edema in legs no c/c  neuro: not awake or alert; legs are a bit contracted  : + ocampo: yel urine; no bolld  Anal: + Dignishield w/ yel/brn liquid stool - no blood    LABS:                      9.3     (    86.2   10.68 )-----------( ---------      429      ( 10 Henrik 2025 05:48 )             28.1    (    17.5     141   (   105   (   60      01-10-25 @ 05:48  ----------------------               3.9   (   25   (   65                             -----                        1.1  Ca  8.9   Mg  1.9    P   --     LFT  5.1  (  0.3  (  47       01-10-25 @ 05:48  -------------------------  3.0  (  180  (  39    Alb 3.0  T javi 0.3     AST 47  ALT 39  01-10-25 @ 05:48 - stable  Alb 2.8  T javi 0.4     AST 46  ALT 41  01-09-25 @ 09:35  Alb 3.0  T javi <0.2     AST 69  ALT 46  01-08-25 @ 06:27  Alb 2.9  T javi 0.3     AST 42  ALT 34  01-07-25 @ 07:31  Alb 3.1  T javi 0.3     AST 56  ALT 36  01-06-25 @ 05:41    Urinalysis Basic - ( 10 Henrik 2025 05:48 )    Color: x / Appearance: x / SG: x / pH: x  Gluc: 60 mg/dL / Ketone: x  / Bili: x / Urobili: x   Blood: x / Protein: x / Nitrite: x   Leuk Esterase: x / RBC: x / WBC x   Sq Epi: x / Non Sq Epi: x / Bacteria: x    CAPILLARY BLOOD GLUCOSE  POCT Blood Glucose.: 158 (01-10-25 @ 11:19)  POCT Blood Glucose.: 82 (01-10-25 @ 05:25)  POCT Blood Glucose.: 136 (01-09-25 @ 23:50)  POCT Blood Glucose.: 174 (01-09-25 @ 21:12)  POCT Blood Glucose.: 184 (01-09-25 @ 16:38)  POCT Blood Glucose.: 200 (01-09-25 @ 11:27)  POCT Blood Glucose.: 147 (01-09-25 @ 07:44)  POCT Blood Glucose.: 122 (01-08-25 @ 21:15)    RADIOLOGY & ADDITIONAL TESTS:    MEDICATIONS:  piperacillin/tazobactam IVPB.. 3.375 Gram(s) IV Intermittent every 8 hours    amantadine Syrup 50 milliGRAM(s) Oral two times a day  amLODIPine   Tablet 10 milliGRAM(s) Oral at bedtime  artificial  tears Solution 1 Drop(s) Both EYES every 8 hours  chlorhexidine 2% Cloths 1 Application(s) Topical daily  furosemide Solution 20 milliGRAM(s) Oral daily  glucagon  Injectable 1 milliGRAM(s) IntraMuscular once  heparin   Injectable 5000 Unit(s) SubCutaneous every 12 hours  insulin glargine Injectable (LANTUS) 16 Unit(s) SubCutaneous at bedtime  insulin lispro (ADMELOG) corrective regimen sliding scale   SubCutaneous every 6 hours  insulin lispro Injectable (ADMELOG) 4 Unit(s) SubCutaneous every 6 hours  labetalol 200 milliGRAM(s) Enteral Tube every 12 hours  lacosamide Solution 200 milliGRAM(s) Oral two times a day  levETIRAcetam  Solution 1500 milliGRAM(s) Oral two times a day  loperamide Liquid 2 milliGRAM(s) Oral daily  pantoprazole   Suspension 40 milliGRAM(s) Oral daily  potassium chloride   Solution 20 milliEquivalent(s) Oral daily

## 2025-01-10 NOTE — PHYSICAL THERAPY INITIAL EVALUATION ADULT - PERTINENT HX OF CURRENT PROBLEM, REHAB EVAL
9-year-old woman with PMHx of myelodysplastic syndrome (MDS) followed by Dr. Wade (requiring periodic transfusions for anemia), DM, HTN, CKD 2, and multiple recent admissions for a b/l subdural hematomas (SDH) with mass effect and midline shift (status-post evacuation and middle meningeal artery embolization complicated by a leaking craniotomy site and concern for status epilepticus) presented to the ED anemia.

## 2025-01-11 NOTE — PROGRESS NOTE ADULT - SUBJECTIVE AND OBJECTIVE BOX
SIMONA KUHN  79y  Female  ***My note supersedes ALL resident notes that I sign.  My corrections for their notes are in my note.***    I can be reached directly via Teams or my office number is [phone] or [phone].    INTERVAL EVENTS: Here for f/u of asp PNA. On/off cough 2/2 on/off aspiration. HOB is up. Still on RA. Cough is mild to me. Pt not awake or alert.    T(F): 99.4 (01-11-25 @ 13:35), Max: 99.4 (01-11-25 @ 13:35)  HR: 115 (01-11-25 @ 13:35) (106 - 121)  BP: 179/97 (01-11-25 @ 13:35) (146/72 - 179/97)  RR: 19 (01-11-25 @ 13:35) (18 - 19)  SpO2: 92% (01-11-25 @ 13:35) (92% - 97%)    01-10-25 @ 07:01  -  01-11-25 @ 07:00  --------------------------------------------------------  IN: 0 mL / OUT: 800 mL / NET: -800 mL    Gen: no resp distress; non-verb; not interactive  HEENT: PERRL, mouth clr, nose clr;   Neck: no nodes, no JVD, thyroid nl  chest: rt port-a-cath  lungs: decr BS bases; no wheeze; mostly dry  hrt: s1 s2 reg, tachy, no murmur  abd: soft, NT/ND, no HS megaly; PEG - site w/ min erythema; no d/c  ext: 1+ edema in arms, tr edema in legs no c/c  neuro: not awake or alert;  : + ocampo: yel urine; no blood  Anal: + Dignishield w/ yel/brn liquid stool - no blood    LABS:                      9.3     (    86.2   10.68 )-----------( ---------      429      ( 10 Henrik 2025 05:48 )             28.1    (    17.5     Hemoglobin: 9.3 g/dL (01-10 @ 05:48)  Hemoglobin: 9.4 g/dL (01-09 @ 09:35)  Hemoglobin: 7.1 g/dL (01-08 @ 06:27)  Hemoglobin: 7.7 g/dL (01-07 @ 07:31)    Urinalysis Basic - ( 10 Henrik 2025 05:48 )    Color: x / Appearance: x / SG: x / pH: x  Gluc: 60 mg/dL / Ketone: x  / Bili: x / Urobili: x   Blood: x / Protein: x / Nitrite: x   Leuk Esterase: x / RBC: x / WBC x   Sq Epi: x / Non Sq Epi: x / Bacteria: x    CAPILLARY BLOOD GLUCOSE  POCT Blood Glucose.: 48 (01-11-25 @ 13:10)  POCT Blood Glucose.: 49 (01-11-25 @ 13:04)  POCT Blood Glucose.: 42 (01-11-25 @ 10:54)  POCT Blood Glucose.: 85 (01-11-25 @ 07:50)  POCT Blood Glucose.: 107 (01-11-25 @ 07:36)  POCT Blood Glucose.: 41 (01-11-25 @ 06:34)  POCT Blood Glucose.: 156 (01-10-25 @ 22:49)  POCT Blood Glucose.: 125 (01-10-25 @ 17:56)    RADIOLOGY & ADDITIONAL TESTS:    MEDICATIONS:  piperacillin/tazobactam IVPB.. 3.375 Gram(s) IV Intermittent every 8 hours    amantadine Syrup 50 milliGRAM(s) Oral two times a day  amLODIPine   Tablet 10 milliGRAM(s) Oral at bedtime  artificial  tears Solution 1 Drop(s) Both EYES every 8 hours  chlorhexidine 2% Cloths 1 Application(s) Topical daily  furosemide Solution 20 milliGRAM(s) Oral daily  heparin   Injectable 5000 Unit(s) SubCutaneous every 12 hours  insulin glargine Injectable (LANTUS) 12 Unit(s) SubCutaneous at bedtime  insulin lispro (ADMELOG) corrective regimen sliding scale   SubCutaneous every 6 hours  insulin lispro Injectable (ADMELOG) 3 Unit(s) SubCutaneous every 6 hours  labetalol 300 milliGRAM(s) Oral three times a day  lacosamide Solution 200 milliGRAM(s) Oral two times a day  levETIRAcetam  Solution 1500 milliGRAM(s) Oral two times a day  loperamide Liquid 2 milliGRAM(s) Oral daily  pantoprazole   Suspension 40 milliGRAM(s) Oral daily  potassium chloride   Solution 20 milliEquivalent(s) Oral daily

## 2025-01-11 NOTE — PROGRESS NOTE ADULT - SUBJECTIVE AND OBJECTIVE BOX
SUBJECTIVE/OVERNIGHT EVENTS  Today is hospital day 11d. This morning patient was seen and examined at bedside, resting comfortably in bed. No acute or major events overnight.    MEDICATIONS  STANDING MEDICATIONS  amantadine Syrup 50 milliGRAM(s) Oral two times a day  amLODIPine   Tablet 10 milliGRAM(s) Oral at bedtime  artificial  tears Solution 1 Drop(s) Both EYES every 8 hours  chlorhexidine 2% Cloths 1 Application(s) Topical daily  dextrose 50% Injectable 25 Gram(s) IV Push once  furosemide Solution 20 milliGRAM(s) Oral daily  glucagon  Injectable 1 milliGRAM(s) IntraMuscular once  heparin   Injectable 5000 Unit(s) SubCutaneous every 12 hours  insulin glargine Injectable (LANTUS) 12 Unit(s) SubCutaneous at bedtime  insulin lispro (ADMELOG) corrective regimen sliding scale   SubCutaneous every 6 hours  insulin lispro Injectable (ADMELOG) 3 Unit(s) SubCutaneous every 6 hours  labetalol 300 milliGRAM(s) Oral three times a day  lacosamide Solution 200 milliGRAM(s) Oral two times a day  levETIRAcetam  Solution 1500 milliGRAM(s) Oral two times a day  loperamide Liquid 2 milliGRAM(s) Oral daily  pantoprazole   Suspension 40 milliGRAM(s) Oral daily  piperacillin/tazobactam IVPB.. 3.375 Gram(s) IV Intermittent every 8 hours  potassium chloride   Solution 20 milliEquivalent(s) Oral daily    PRN MEDICATIONS    VITALS  T(F): 97.4 (01-11-25 @ 05:02), Max: 99.3 (01-10-25 @ 11:00)  HR: 111 (01-11-25 @ 05:02) (106 - 121)  BP: 173/68 (01-11-25 @ 05:02) (146/72 - 188/70)  RR: 18 (01-11-25 @ 05:02) (18 - 18)  SpO2: 97% (01-11-25 @ 05:02) (94% - 97%)  POCT Blood Glucose.: 85 mg/dL (01-11-25 @ 07:50)  POCT Blood Glucose.: 107 mg/dL (01-11-25 @ 07:36)  POCT Blood Glucose.: 41 mg/dL (01-11-25 @ 06:34)  POCT Blood Glucose.: 156 mg/dL (01-10-25 @ 22:49)  POCT Blood Glucose.: 125 mg/dL (01-10-25 @ 17:56)  POCT Blood Glucose.: 158 mg/dL (01-10-25 @ 11:19)    PHYSICAL EXAM  GENERAL  ( + ) NAD, lying in bed comfortably     (  ) obtunded     (  ) lethargic     (  ) somnolent    HEAD  ( + ) Atraumatic     (  ) hematoma     (  ) laceration (specify location:       )     NECK  ( + ) Supple     (  ) neck stiffness     (  ) nuchal rigidity     (  )  no JVD     (  ) JVD present ( -- cm)    HEART  Rate -->  ( + ) normal rate    (  ) bradycardic    (  ) tachycardic  Rhythm -->  ( + ) regular    (  ) regularly irregular    (  ) irregularly irregular  Murmurs -->  (  ) normal s1/s2    (  ) systolic murmur    (  ) diastolic murmur    (  ) continuous murmur     (  ) S3 present    (  ) S4 present    LUNGS  ( + )Unlabored respirations     (  ) tachypnea  ( + ) B/L air entry     (  ) decreased breath sounds in:  (location     )    (  ) no adventitious sound     (  ) crackles     (  ) wheezing      (  ) rhonchi      (specify location:       )  (  ) chest wall tenderness (specify location:       )    ABDOMEN  ( + ) Soft     (  ) tense   |   (  ) nondistended     (  ) distended   |   (  ) +BS     (  ) hypoactive bowel sounds     (  ) hyperactive bowel sounds  ( + ) nontender     (  ) RUQ tenderness     (  ) RLQ tenderness     (  ) LLQ tenderness     (  ) epigastric tenderness     (  ) diffuse tenderness  (  ) Splenomegaly      (  ) Hepatomegaly      (  ) Jaundice     (  ) ecchymosis     EXTREMITIES  ( + ) Normal     (  ) Rash     (  ) ecchymosis     (  ) varicose veins      (  ) pitting edema     (  ) non-pitting edema   (  ) ulceration     (  ) gangrene:     (location:     )    NERVOUS SYSTEM  ( + ) A&Ox0     (  ) confused     (  ) lethargic  CN II-XII:     (  ) Intact     (  ) focal deficits  (Specify:     )   Upper extremities:     (  ) strength X/5     (  ) focal deficit (specify:    )  Lower extremities:     (  ) strength  X/5    (  ) focal deficit (specify:    )    SKIN  ( + ) No rashes or lesions     (  ) maculopapular rash     (  ) pustules     (  ) vesicles     (  ) ulcer     (  ) ecchymosis     (specify location:     )    LABS             9.3    10.68 )-----------( 429      ( 01-10-25 @ 05:48 )             28.1     141  |  105  |  65  -------------------------<  60   01-10-25 @ 05:48  3.9  |  25  |  1.1    Ca      8.9     01-10-25 @ 05:48  Mg     1.9     01-10-25 @ 05:48    TPro  5.1  /  Alb  3.0  /  TBili  0.3  /  DBili  x   /  AST  47  /  ALT  39  /  AlkPhos  180  /  GGT  x     01-10-25 @ 05:48    Urinalysis Basic - ( 10 Henrik 2025 05:48 )    Color: x / Appearance: x / SG: x / pH: x  Gluc: 60 mg/dL / Ketone: x  / Bili: x / Urobili: x   Blood: x / Protein: x / Nitrite: x   Leuk Esterase: x / RBC: x / WBC x   Sq Epi: x / Non Sq Epi: x / Bacteria: x

## 2025-01-11 NOTE — PROGRESS NOTE ADULT - ASSESSMENT
79-year-old woman with PMHx of myelodysplastic syndrome (MDS) followed by Dr. Wade (requiring periodic transfusions for anemia), DM, HTN, CKD 2, and multiple recent admissions for a b/l subdural hematomas (SDH) with mass effect and midline shift (status-post evacuation and middle meningeal artery embolization complicated by a leaking craniotomy site and concern for status epilepticus) presented to the ED for anemia on 12/31. [facility] was found to be anemic to 5.6 being sent to ED for transfusion. She is nonverbal, bedbound, and has a PEG tube.    # This is a STAR patient - GOC last done by [facility] 1/6    # pt is immunocompromised 2/2 age, MDS, DM    # pt is non-verbal and has functional quadriplegia. Prior to arrival to ED on 10/8/24 for being found unresponsive on floor, pt was independent of all ADLs, lived alone, took care of herself well and had no dementia.    # Fever and leukocytosis - suspect asp PNA/pneumonitis - likely cause of on/off cough  RVP neg  BCx neg  UCx neg - doubt UTI  MRSA nares: neg  CXR w/ opac b/l  hx aspiration  wbc improved  c/w NC O2 to keep sat > 92% (decr to 1L)  ID eval  abx: completed zosyn 3.375gm iv q8 til 1/10     # hypervolemic hypernatremia - needed to tx both - both are improved  BP on higher side  lasix liq 20mg peg q24 (high BUN)  c/w KCL solution 20 meq peg q24  IVFs: off  free water added after feeds (400cc PEG q6 2 hrs after each feed)  always correct Na for glucose  fix DM (causing osmotic diuresis w/ dehydration)  has loose BM - avoid laxatives  c/w imodium liq 2mg PEG q12    # hypokalemia - better  KCL via PEG (above)    # HTN  on lasix (above)  amlodipine 10mg peg q24  incr labetalol 300mg po q8    # DM w/ hyperglycemia  Diet: on nepro w/ carb steady via PEG  FS 4x/day before feeds (NOT ac/hs)  goal for -180 - hypoglycemic  decr lantus 12u HS  decr lisp 3u before each feed (4x/day)    # Unstageable sacral ulcer 4x7cm  WOCN noted:  Cleanse wound to sacrococcygeal region with Vashe wound cleanser. Pat dry, apply Medihoney, cover with xeroform and abdominal pad twice a day and prn for soiling.     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)  hgb dropped a little (MDS + phlebotomy + dilutional) -> try to limit labs  no overt bleeding  s/p 1u prbc 1/8  keep hgb > 7    # CKD 2 (cr at baseline)  Cr 0.9 - at baseline    # Chronic bilateral effusions   pro-BNP 9727, significantly increased from 1439 in Oct 2024  lasix as above    # HO SDH   s/p evacuation and middle meningeal artery embolization complicated by a leaking craniotomy site and concern for status epilepticus  c/w home antiepileptics    # PEG  NO guaze UNDER PEG bumper. Guaze can go OVER bumper and taped in place    # chr ocampo for urine retention (neurogenic bladder)  failed TOV    # minor LFT abnl - prob 2/2 illness and meds    # DVT ppx: Hep sc q12    # GI ppx: PPI PEG    # Activity: bedbound; func quad    # Code: DNR/DNI    # updated dtr at bedside today    # Dtr, Jose, is aware and agrees that pt will be going for long term hospice care at [facility]    Dispo: c/w ocampo; c/w diginishield (remove on d/c); c/w imodium; c/w free water; peg lasix/KCL; sacral care;   eventually, pt will go to [facility] on hospice - f/u CM - stable for d/c (await auth)    Prog is poor.

## 2025-01-11 NOTE — PROGRESS NOTE ADULT - ASSESSMENT
79-year-old woman with PMHx of myelodysplastic syndrome (MDS) followed by Dr. Wade (requiring periodic transfusions for anemia), DM, HTN, CKD 2, and multiple recent admissions for a b/l subdural hematomas (SDH) with mass effect and midline shift (status-post evacuation and middle meningeal artery embolization complicated by a leaking craniotomy site and concern for status epilepticus) presented to the ED for anemia on 12/31. Patient was at [facility] center was found to be anemic to 5.6 being sent to ED for transfusion. She is nonverbal, bedbound, and has a PEG tube.    # This is a STAR patient - GOC last done by [facility] 1/6  # pt is immunocompromised 2/2 age, MDS, DM  # pt is non-verbal and has functional quadriplegia. Prior to arrival to ED on 10/8/24 for being found unresponsive on floor, pt was independent of all ADLs, lived alone, took care of herself well and had no dementia.    # Fever and leukocytosis - suspect asp PNA/pneumonitis  RVP neg  BCx neg  UCx neg - doubt UTI  MRSA nares: neg  CXR w/ opac b/l  hx aspiration  wbc improved  c/w NC O2 to keep sat > 92% (decr to 1L)  ID eval  abx: zosyn 3.375gm iv q8 - til 1/10 - no further abx needed beyond today or at NH (can d/c to SNF anytime, no need to wait on abx)    # hypervolemic hypernatremia - need to tx both - both are improved  BP on higher side  lasix liq 20mg peg q24 (high BUN)  c/w KCL solution 20 meq peg q24  DAILY WTS: 145 -> 148 -> 149 lbs ->  IVFs: off  free water added after feeds (400cc PEG q6 2 hrs after each feed)  always correct Na for glucose  fix DM (causing osmotic diuresis w/ dehydration)  has loose BM - avoid laxatives  c/w imodium liq 2mg PEG q12    # hypokalemia - better  KCL via PEG    # HTN  on lasix (above)  amlodipine 10mg peg q24  incr labetalol 300mg po q8    # DM w/ hyperglycemia  Diet: on nepro w/ carb steady via PEG  FS 4x/day before feeds (NOT ac/hs)  goal for -180  c/w lantus 12u HS  c/w lisp 3u before each feed (4x/day)    # Unstageable sacral ulcer 4x7cm  WOCN noted:  Cleanse wound to sacrococcygeal region with Vashe wound cleanser. Pat dry, apply Medihoney, cover with xeroform and abdominal pad twice a day and prn for soiling.     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 Guaze can go OVER bumper and taped in place    # chr ocampo for urine retention (neurogenic bladder)  failed TOV    # minor LFT abnl - prob 2/2 illness and meds    # DVT ppx: Hep sc q12  # GI ppx: PPI PEG  # Activity: bedbound; func quad  # Code: DNR/DNI  # Jose Bryan, is aware and agrees that pt will be going for long term hospice care at [facility]

## 2025-01-12 NOTE — PROGRESS NOTE ADULT - SUBJECTIVE AND OBJECTIVE BOX
SIMONA KUHN  79y  Female  ***My note supersedes ALL resident notes that I sign.  My corrections for their notes are in my note.***    I can be reached directly via Teams or my office number is [phone] or [phone].    INTERVAL EVENTS: Here for f/u of asp PNA. Pt the same. NO changes.    T(F): 98.6 (01-12-25 @ 14:29), Max: 99.5 (01-12-25 @ 05:06)  HR: 96 (01-12-25 @ 14:29) (96 - 96)  BP: 106/58 (01-12-25 @ 14:29) (106/58 - 150/69)  RR: 19 (01-12-25 @ 14:29) (18 - 19)  SpO2: 92% (01-12-25 @ 14:29) (92% - 98%)    01-11-25 @ 07:01  -  01-12-25 @ 07:00  --------------------------------------------------------  IN: 0 mL / OUT: 1000 mL / NET: -1000 mL    Gen: no resp distress; non-verb; not interactive  HEENT: PERRL, mouth clr, nose clr;   Neck: no nodes, no JVD, thyroid nl  chest: rt port-a-cath  lungs: decr BS bases; no wheeze; mostly dry  hrt: s1 s2 reg, tachy, no murmur  abd: soft, NT/ND, no HS megaly; PEG - site w/ min erythema; no d/c  ext: 1+ edema in arms, tr edema in legs no c/c  neuro: not awake or alert;  : + ocampo: yel urine; no blood  Anal: + Dignishield w/ yel/brn liquid stool - no blood    LABS:                      8.2     (    88.0   14.20 )-----------( ---------      425      ( 12 Jan 2025 04:55 )             25.0    (    16.8     WBC Count: 14.20 K/uL (01-12-25 @ 04:55) - back up, prob pneumonitis  WBC Count: 10.68 K/uL (01-10-25 @ 05:48)  WBC Count: 10.99 K/uL (01-09-25 @ 09:35)  WBC Count: 11.26 K/uL (01-08-25 @ 06:27)    Hemoglobin: 8.2 g/dL (01-12 @ 04:55)  Hemoglobin: 9.3 g/dL (01-10 @ 05:48)  Hemoglobin: 9.4 g/dL (01-09 @ 09:35)  Hemoglobin: 7.1 g/dL (01-08 @ 06:27)    145   (   108   (   131      01-12-25 @ 04:55  ----------------------               5.1   (   26   (   61                             -----                        1.1  Ca  9.0   Mg  2.0    P   --     LFT  5.0  (  0.3  (  38       01-12-25 @ 04:55  -------------------------  2.8  (  178  (  37    Urinalysis Basic - ( 12 Jan 2025 04:55 )    Color: x / Appearance: x / SG: x / pH: x  Gluc: 131 mg/dL / Ketone: x  / Bili: x / Urobili: x   Blood: x / Protein: x / Nitrite: x   Leuk Esterase: x / RBC: x / WBC x   Sq Epi: x / Non Sq Epi: x / Bacteria: x    CAPILLARY BLOOD GLUCOSE  POCT Blood Glucose.: 130 (01-12-25 @ 16:34)  POCT Blood Glucose.: 184 (01-12-25 @ 11:37)  POCT Blood Glucose.: 167 (01-12-25 @ 07:40)  POCT Blood Glucose.: 137 (01-12-25 @ 06:12)  POCT Blood Glucose.: 114 (01-12-25 @ 00:16)  POCT Blood Glucose.: 123 (01-11-25 @ 21:18)  POCT Blood Glucose.: 98 (01-11-25 @ 16:42)  POCT Blood Glucose.: 93 (01-11-25 @ 14:51)    RADIOLOGY & ADDITIONAL TESTS:    MEDICATIONS:    amantadine Syrup 50 milliGRAM(s) Oral two times a day  amLODIPine   Tablet 10 milliGRAM(s) Oral at bedtime  artificial  tears Solution 1 Drop(s) Both EYES every 8 hours  chlorhexidine 2% Cloths 1 Application(s) Topical daily  dextrose 50% Injectable 25 Gram(s) IV Push once  furosemide Solution 20 milliGRAM(s) Oral daily  glucagon  Injectable 1 milliGRAM(s) IntraMuscular once  heparin   Injectable 5000 Unit(s) SubCutaneous every 12 hours  insulin glargine Injectable (LANTUS) 12 Unit(s) SubCutaneous at bedtime  insulin lispro (ADMELOG) corrective regimen sliding scale   SubCutaneous every 6 hours  insulin lispro Injectable (ADMELOG) 3 Unit(s) SubCutaneous every 6 hours  labetalol 300 milliGRAM(s) Oral three times a day  lacosamide Solution 200 milliGRAM(s) Oral two times a day  levETIRAcetam  Solution 1500 milliGRAM(s) Oral two times a day  loperamide Liquid 2 milliGRAM(s) Oral daily  pantoprazole   Suspension 40 milliGRAM(s) Oral daily  potassium chloride   Solution 20 milliEquivalent(s) Oral daily

## 2025-01-12 NOTE — PROGRESS NOTE ADULT - ASSESSMENT
79-year-old woman with PMHx of myelodysplastic syndrome (MDS) followed by Dr. Wade (requiring periodic transfusions for anemia), DM, HTN, CKD 2, and multiple recent admissions for a b/l subdural hematomas (SDH) with mass effect and midline shift (status-post evacuation and middle meningeal artery embolization complicated by a leaking craniotomy site and concern for status epilepticus) presented to the ED for anemia on 12/31. Patient was at [facility] center was found to be anemic to 5.6 being sent to ED for transfusion. She is nonverbal, bedbound, and has a PEG tube.    # This is a STAR patient - GOC last done by [facility] 1/6    # pt is immunocompromised 2/2 age, MDS, DM    # pt is non-verbal and has functional quadriplegia. Prior to arrival to ED on 10/8/24 for being found unresponsive on floor, pt was independent of all ADLs, lived alone, took care of herself well and had no dementia.    # pt is going to SNF for hospice - please do NOT stop d/c because of "abnl labs"    # Fever and leukocytosis - suspect asp PNA/pneumonitis - likely cause of on/off cough  RVP neg  BCx neg  UCx neg - doubt UTI  MRSA nares: neg  CXR w/ opac b/l  hx aspiration  wbc increased -> really no need to follow; if pt spikes fever; use augmentin 400mg/5cc liquid 10cc (800mg) via PEG q12  c/w NC O2 to keep sat > 92% (decr to 1L)  ID eval  abx: completed zosyn 3.375gm iv q8 til 1/10     # hypervolemic hypernatremia - needed to tx both - both are improved  BP on higher side  lasix liq 20mg peg q24 (high BUN)  d/c KCL solution (K 5.1)  IVFs: off  free water added after feeds (400cc PEG q6 2 hrs after each feed)  always correct Na for glucose  fix DM (causing osmotic diuresis w/ dehydration)  has loose BM - avoid laxatives  c/w imodium liq 2mg PEG q12    # hypokalemia - better    # HTN  on lasix (above)  amlodipine 10mg peg q24  labetalol 300mg po q8    # DM w/ hyperglycemia  Diet: on nepro w/ carb steady via PEG  FS 4x/day before feeds (NOT ac/hs)  goal for -180 -   decr lantus 12u HS  decr lisp 3u before each feed (4x/day)    # Unstageable sacral ulcer 4x7cm  WOCN noted:  Cleanse wound to sacrococcygeal region with Vashe wound cleanser. Pat dry, apply Medihoney, cover with xeroform and abdominal pad twice a day and prn for soiling.     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)  hgb dropped a little (MDS + phlebotomy + dilutional) -> try to limit labs  no overt bleeding  s/p 1u prbc 1/8  keep hgb > 7    # CKD 2 (cr at baseline)  Cr baseline low 1s    # Chronic bilateral effusions   pro-BNP 9727, significantly increased from 1439 in Oct 2024  lasix as above    # HO SDH   s/p evacuation and middle meningeal artery embolization complicated by a leaking craniotomy site and concern for status epilepticus  c/w home antiepileptics    # PEG  NO guaze UNDER PEG bumper. Guaze can go OVER bumper and taped in place    # chr ocampo for urine retention (neurogenic bladder)  failed TOV    # minor LFT abnl - prob 2/2 illness and meds    # DVT ppx: Hep sc q12    # GI ppx: PPI PEG    # Activity: bedbound; func quad    # Code: DNR/DNI    # DtrJose, is aware and agrees that pt will be going for long term hospice care at [facility]    Dispo: c/w ocampo; c/w diginishield (remove on d/c); c/w imodium; c/w free water; tx DM/BP; sacral care;   eventually, pt will go to [facility] on hospice - f/u CM - stable for d/c (await auth)    Prog is poor.

## 2025-01-13 NOTE — PROGRESS NOTE ADULT - SUBJECTIVE AND OBJECTIVE BOX
HPI:  Case of 79-year-old woman with PMHx of myelodysplastic syndrome (MDS) followed by Dr. Wade (requiring periodic transfusions for anemia), DM, HTN, CKD 2, and multiple recent admissions for a b/l subdural hematomas (SDH) with mass effect and midline shift (status-post evacuation and middle meningeal artery embolization complicated by a leaking craniotomy site and concern for status epilepticus) presented to the ED anemia.      Interval history:     - Palliative care re-consulted for gocs as pt is medically declining and awaiting hospice transfer      ADVANCE DIRECTIVES:     [ ] Full Code [ X] DNR  MOLST  [ ]  Living Will  [ ]   DECISION MAKER(s):  [ ] Health Care Proxy(s)  [ ] Surrogate(s)  [ ] Guardian           Name(s): Phone Number(s):    Answers: Emergency Contact Name Jose Kennedy,  Answers: Emergency Contact Phone # [phone],  Answers: Emergency Contact Relationship Daughter  BASELINE (I)ADL(s) (prior to admission):    [place]: [ ]Total  [ ] Moderate [ X]Dependent  Palliative Performance Status Version 2:         %    http://npcrc.org/files/news/palliative_performance_scale_ppsv2.pdf    Allergies    No Known Allergies    Intolerances    MEDICATIONS  (STANDING):  amantadine Syrup 50 milliGRAM(s) Oral two times a day  amLODIPine   Tablet 10 milliGRAM(s) Oral at bedtime  amoxicillin   80 mG/mL/clavulanate Suspension 800 milliGRAM(s) Oral once  amoxicillin   80 mG/mL/clavulanate Suspension      artificial  tears Solution 1 Drop(s) Both EYES every 8 hours  chlorhexidine 2% Cloths 1 Application(s) Topical daily  dextrose 50% Injectable 25 Gram(s) IV Push once  furosemide Solution 20 milliGRAM(s) Oral daily  glucagon  Injectable 1 milliGRAM(s) IntraMuscular once  heparin   Injectable 5000 Unit(s) SubCutaneous every 12 hours  insulin glargine Injectable (LANTUS) 12 Unit(s) SubCutaneous at bedtime  insulin lispro (ADMELOG) corrective regimen sliding scale   SubCutaneous every 6 hours  insulin lispro Injectable (ADMELOG) 3 Unit(s) SubCutaneous every 6 hours  labetalol 300 milliGRAM(s) Oral three times a day  lacosamide Solution 200 milliGRAM(s) Oral two times a day  levETIRAcetam  Solution 1500 milliGRAM(s) Oral two times a day  loperamide Liquid 2 milliGRAM(s) Oral daily  pantoprazole   Suspension 40 milliGRAM(s) Oral daily    MEDICATIONS  (PRN):  acetaminophen   Oral Liquid .. 650 milliGRAM(s) Oral every 6 hours PRN Temp greater or equal to 38C (100.4F), Mild Pain (1 - 3)    PRESENT SYMPTOMS: [X ]Unable to obtain due to poor mentation   Source if other than patient:  [ ]Family   [ ]Team     Pain: [ ]yes [ ]no  QOL impact -   Location -                    Aggravating factors -  Quality -  Radiation -  Timing-  Severity (0-10 scale):  Minimal acceptable level (0-10 scale):     CPOT:    https://[email or web address]/getattachment/[email or web address]/Critical-Care-Pain-Observation-Tool-(CPOT)    PAIN AD Score: 4  http://geriatrictoolkit.[email or web address]/painad.pdf (press ctrl +  left click to view)    Dyspnea:                           [ ]None[ ]Mild [ ]Moderate [ ]Severe     Respiratory Distress Observation Scale (RDOS): 3  A score of 0 to 2 signifies little or no respiratory distress, 3 signifies mild distress, scores 4 to 6 indicate moderate distress, and scores greater than 7 signify severe distress  [email or web address]/sites/default/files/[email or web address]-fxnlfwqqdbs-pwfwgyud-llslexspewl-xbrkv.pdf    Anxiety:                             [ ]None[ ]Mild [ ]Moderate [ ]Severe   Fatigue:                             [ ]None[ ]Mild [ ]Moderate [ ]Severe   Nausea:                             [ ]None[ ]Mild [ ]Moderate [ ]Severe   Loss of appetite:              [ ]None[ ]Mild [ ]Moderate [ ]Severe   Constipation:                    [ ]None[ ]Mild [ ]Moderate [ ]Severe    Other Symptoms:  [ ]All other review of systems negative     Palliative Performance Status Version 2:         %    http://npcrc.org/files/news/palliative_performance_scale_ppsv2.pdf    PHYSICAL EXAM:  Vital Signs Last 24 Hrs  T(C): 38 (13 Jan 2025 14:00), Max: 38 (13 Jan 2025 14:00)  T(F): 100.4 (13 Jan 2025 14:00), Max: 100.4 (13 Jan 2025 14:00)  HR: 90 (13 Jan 2025 14:00) (90 - 109)  BP: 118/67 (13 Jan 2025 14:00) (118/57 - 141/71)  BP(mean): 84 (13 Jan 2025 14:00) (77 - 84)  RR: 22 (13 Jan 2025 14:00) (18 - 22)  SpO2: 97% (13 Jan 2025 14:00) (94% - 97%)    Parameters below as of 13 Jan 2025 14:00  Patient On (Oxygen Delivery Method): nasal cannula  O2 Flow (L/min): 5   I&O's Summary    12 Jan 2025 07:01  -  13 Jan 2025 07:00  --------------------------------------------------------  IN: 460 mL / OUT: 1760 mL / NET: -1300 mL    13 Jan 2025 07:01  -  13 Jan 2025 16:13  --------------------------------------------------------  IN: 460 mL / OUT: 100 mL / NET: 360 mL        GENERAL:  [ ] No acute distress [X ]Lethargic  [ ]Unarousable  [ ]Verbal  [ ]Non-Verbal [ ]Cachexia    BEHAVIORAL/PSYCH:  [ ]Alert and Oriented x  [ ] Anxiety [ ] Delirium [ ] Agitation [ ] Calm   EYES: [ ] No scleral icterus [ ] Scleral icterus [ X] Closed  ENMT:  [ ]Dry mouth  [ ]No external oral lesions [ ] No external ear or nose lesions  CARDIOVASCULAR:  [ ]Regular [ ]Irregular [ ]Tachy [ ]Not Tachy  [ ][place] [ X] Edema [ ] No edema  PULMONARY:  [X ]Tachypnea  [ ]Audible excessive secretions [ ] No labored breathing [ X] labored breathing  GASTROINTESTINAL: [ ]Soft  [ ]Distended  [ ]Not distended [ ]Non tender [ ]Tender  MUSCULOSKELETAL: [ ]No clubbing [ ] clubbing  [ ] No cyanosis [X ] cyanosis  NEUROLOGIC: [ ]No focal deficits  [ ]Follows commands  [ ]Does not follow commands  [X ]Cognitive impairment  [X ]Dysphagia  [ ]Dysarthria  [ ]Paresis   SKIN: [ ] Jaundiced [ ] Non-jaundiced [ ]Rash [ ]No Rash [ ] Warm [ ] Dry  MISC/LINES: [ ] ET tube [ ] Trach [ ]NGT/OGT [ ]PEG [ X]Small DIGNITY SHIELD (+) LOOSE BM     LABS: reviewed by me                        8.2    14.20 )-----------( 425      ( 12 Jan 2025 04:55 )             25.0   01-12    145  |  108  |  61[HH]  ----------------------------<  131[H]  5.1[H]   |  26  |  1.1    Ca    9.0      12 Jan 2025 04:55  Mg     2.0     01-12    TPro  5.0[L]  /  Alb  2.8[L]  /  TBili  0.3  /  DBili  x   /  AST  38  /  ALT  37  /  AlkPhos  178[H]  01-12      Urinalysis Basic - ( 12 Jan 2025 04:55 )    Color: x / Appearance: x / SG: x / pH: x  Gluc: 131 mg/dL / Ketone: x  / Bili: x / Urobili: x   Blood: x / Protein: x / Nitrite: x   Leuk Esterase: x / RBC: x / WBC x   Sq Epi: x / Non Sq Epi: x / Bacteria: x      RADIOLOGY & ADDITIONAL STUDIES: reviewed by me    EKG: reviewed by me    Palliative Care Spiritual/Emotional Screening Tool Question  Severity (0-4):  Score of 2 or greater indicates recommendation of Chaplaincy referral  Chaplaincy Referral: [ ] Yes [ ] Refused [ ] Following    Caregiver [place]:  [ ] Yes [ ] No [ ] Deferred  Social Work Referral [ ]  Patient and Family Centered Care Referral [ ]    Anticipatory Grief Present: [ ] Yes [ ] No [ ] Deferred  Social Work Referral [ ]  Patient and Family Centered Care Referral [ ]    Patient discussed with primary medical team MD  Palliative care education provided to patient and/or family

## 2025-01-13 NOTE — PROGRESS NOTE ADULT - ASSESSMENT
79-year-old woman with PMHx of myelodysplastic syndrome (MDS) followed by Dr. Wade (requiring periodic transfusions for anemia), DM, HTN, CKD 2, and multiple recent admissions for a b/l subdural hematomas (SDH) with mass effect and midline shift (status-post evacuation and middle meningeal artery embolization complicated by a leaking craniotomy site and concern for status epilepticus) presented to the ED for anemia on 12/31. [facility] was found to be anemic to 5.6 being sent to ED for transfusion. She is nonverbal, bedbound, and has a PEG tube.    # pt is immunocompromised 2/2 age, MDS, DM    # pt is non-verbal and has functional quadriplegia. Prior to arrival to ED on 10/8/24 for being found unresponsive on floor, pt was independent of all ADLs, lived alone, took care of herself well and had no dementia.    # pt is going to SNF for hospice - patient is appropriate for long term care at SNF for hospice     # Fever and leukocytosis - suspect asp PNA/pneumonitis - likely cause of on/off cough  RVP neg  BCx neg  UCx neg - doubt UTI  MRSA nares: neg  CXR w/ opac b/l  hx aspiration  wbc increased -> really no need to follow; if pt spikes fever; use augmentin 400mg/5cc liquid 10cc (800mg) via PEG q12  c/w NC O2 to keep sat > 92   Patient desatted overnight  increased o2 requirements to 5 L   abx: completed zosyn 3.375gm iv q8 til 1/10     # hypervolemic hypernatremia - needed to tx both - both are improved  BP on higher side  lasix liq 20mg peg q24 (high BUN)  d/c KCL solution (K 5.1)  IVFs: off  free water added after feeds (400cc PEG q6 2 hrs after each feed)  always correct Na for glucose  fix DM (causing osmotic diuresis w/ dehydration)  has loose BM - avoid laxatives  c/w imodium liq 2mg PEG q12    # hypokalemia - better    # HTN  on lasix (above)  amlodipine 10mg peg q24  labetalol 300mg po q8    # DM w/ hyperglycemia  Diet: on nepro w/ carb steady via PEG  FS 4x/day before feeds (NOT ac/hs)  goal for -180 -   decr lantus 12u HS  decr lisp 3u before each feed (4x/day)    # Unstageable sacral ulcer 4x7cm  WOCN noted:  Cleanse wound to sacrococcygeal region with Vashe wound cleanser. Pat dry, apply Medihoney, cover with xeroform and abdominal pad twice a day and prn for soiling.     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)  hgb dropped a little (MDS + phlebotomy + dilutional) -> try to limit labs  no overt bleeding  s/p 1u prbc 1/8  keep hgb > 7    # CKD 2 (cr at baseline)  Cr baseline low 1s    # Chronic bilateral effusions   pro-BNP 9727, significantly increased from 1439 in Oct 2024  lasix as above    # HO SDH   s/p evacuation and middle meningeal artery embolization complicated by a leaking craniotomy site and concern for status epilepticus  c/w home antiepileptics    # PEG  NO guaze UNDER PEG bumper. Guaze can go OVER bumper and taped in place    # chr ocampo for urine retention (neurogenic bladder)  failed TOV    # minor LFT abnl - prob 2/2 illness and meds    # DVT ppx: Hep sc q12    # GI ppx: PPI PEG    # Activity: bedbound; func quad    # Code: DNR/DNI

## 2025-01-13 NOTE — PROGRESS NOTE ADULT - CONVERSATION DETAILS
see below
Palliative care NP spoke to pt's daughter who was at work at the time. She had just spoken to the medical resident    Discussed that her mom is declining medically and due to how advanced her illness is and plan for hospice she may not be able to be medically optimized and may be nearing end of life now. Daughter expressed that she would want mom comfortable, but needs to speak w her brothers and they will come to the hospital this evening.     Explained if they want to initiate comfort care (which I explained was stopping labs/tests/ABT/and VS and giving symptom medications only - like hospice) that they could let the RN know who can get the on call MD. Meanwhile she is agreeable to mom having Morphine available to her for her rapid breathing and if needed for pain
Discussed with daughter Jose, regarding role of palliative care vs hospice. She states patient has not been very responsive since the end of October. At this stage, she wants her mother to be comfortable overall, and realizes that she does not have an optimal quality of life. She is considering hospice; discussed hospice in detail, including sites of care and services provided. She will discuss with case management. Discussed ACP, she is opting for DNR and DNI status, and will discuss CMO with her siblings.

## 2025-01-13 NOTE — PROGRESS NOTE ADULT - SUBJECTIVE AND OBJECTIVE BOX
SUBJECTIVE/OVERNIGHT EVENTS  Today is hospital day 13d. This morning patient was seen and examined at bedside, resting comfortably in bed. No acute or major events overnight.    HOSPITAL COURSE  Day 1:   Day 2:   Day 3:     CODE STATUS:    FAMILY COMMUNICATION  Contact date:  Name of person contacted:  Relationship to patient:  Communication details:    MEDICATIONS  STANDING MEDICATIONS  amantadine Syrup 50 milliGRAM(s) Oral two times a day  amLODIPine   Tablet 10 milliGRAM(s) Oral at bedtime  artificial  tears Solution 1 Drop(s) Both EYES every 8 hours  chlorhexidine 2% Cloths 1 Application(s) Topical daily  dextrose 50% Injectable 25 Gram(s) IV Push once  furosemide Solution 20 milliGRAM(s) Oral daily  glucagon  Injectable 1 milliGRAM(s) IntraMuscular once  heparin   Injectable 5000 Unit(s) SubCutaneous every 12 hours  insulin glargine Injectable (LANTUS) 12 Unit(s) SubCutaneous at bedtime  insulin lispro (ADMELOG) corrective regimen sliding scale   SubCutaneous every 6 hours  insulin lispro Injectable (ADMELOG) 3 Unit(s) SubCutaneous every 6 hours  labetalol 300 milliGRAM(s) Oral three times a day  lacosamide Solution 200 milliGRAM(s) Oral two times a day  levETIRAcetam  Solution 1500 milliGRAM(s) Oral two times a day  loperamide Liquid 2 milliGRAM(s) Oral daily  pantoprazole   Suspension 40 milliGRAM(s) Oral daily    PRN MEDICATIONS    VITALS  T(F): 99.1 (01-13-25 @ 04:49), Max: 99.1 (01-13-25 @ 04:49)  HR: 109 (01-13-25 @ 04:49) (96 - 109)  BP: 141/71 (01-13-25 @ 04:49) (106/58 - 141/71)  RR: 18 (01-13-25 @ 04:49) (18 - 19)  SpO2: 94% (01-13-25 @ 04:49) (79% - 97%)  POCT Blood Glucose.: 208 mg/dL (01-13-25 @ 07:46)  POCT Blood Glucose.: 242 mg/dL (01-13-25 @ 06:16)  POCT Blood Glucose.: 103 mg/dL (01-12-25 @ 23:29)  POCT Blood Glucose.: 117 mg/dL (01-12-25 @ 20:30)  POCT Blood Glucose.: 130 mg/dL (01-12-25 @ 16:34)  POCT Blood Glucose.: 184 mg/dL (01-12-25 @ 11:37)    PHYSICAL EXAM  GENERAL  (  ) NAD, lying in bed comfortably     (  ) obtunded     (  ) lethargic     (X  ) somnolent    HEAD  (X  ) Atraumatic     (  ) hematoma     (  ) laceration (specify location:       )     NECK  (  X) Supple     (  ) neck stiffness     (  ) nuchal rigidity     (  )  no JVD     (  ) JVD present ( -- cm)    HEART  Rate -->  (  X) normal rate    (  ) bradycardic    (  ) tachycardic  Rhythm -->  (X  ) regular    (  ) regularly irregular    (  ) irregularly irregular  Murmurs -->  ( X ) normal s1/s2    (  ) systolic murmur    (  ) diastolic murmur    (  ) continuous murmur     (  ) S3 present    (  ) S4 present    LUNGS  (  )Unlabored respirations     ( X ) tachypnea  (  ) B/L air entry     ( X ) decreased breath sounds in:  (location  R   )    ( X ) no adventitious sound     (  ) crackles     (  ) wheezing      (  ) rhonchi      (specify location:       )  (  ) chest wall tenderness (specify location:       )    ABDOMEN  ( X ) Soft     (  ) tense   |   (  ) nondistended     (  ) distended   |   (X  ) +BS     (  ) hypoactive bowel sounds     (  ) hyperactive bowel sounds  ( X ) nontender     (  ) RUQ tenderness     (  ) RLQ tenderness     (  ) LLQ tenderness     (  ) epigastric tenderness     (  ) diffuse tenderness  (  ) Splenomegaly      (  ) Hepatomegaly      (  ) Jaundice     (  ) ecchymosis     EXTREMITIES  ( X ) Normal     (  ) Rash     (  ) ecchymosis     (  ) varicose veins      (  ) pitting edema     (  ) non-pitting edema   (  ) ulceration     (  ) gangrene:     (location:     )    NERVOUS SYSTEM  (  ) A&Ox3     (  ) confused     (  X) lethargic  CN II-XII:     (  ) Intact     (  ) focal deficits  (Specify:     )   Upper extremities:     (  ) strength X/5     (  ) focal deficit (specify:    )  Lower extremities:     (  ) strength  X/5    (  ) focal deficit (specify:    )    SKIN  (X  ) No rashes or lesions     (  ) maculopapular rash     (  ) pustules     (  ) vesicles     (  ) ulcer     (  ) ecchymosis     (specify location:     )      LABS             8.2    14.20 )-----------( 425      ( 01-12-25 @ 04:55 )             25.0     145  |  108  |  61  -------------------------<  131   01-12-25 @ 04:55  5.1  |  26  |  1.1    Ca      9.0     01-12-25 @ 04:55  Mg     2.0     01-12-25 @ 04:55    TPro  5.0  /  Alb  2.8  /  TBili  0.3  /  DBili  x   /  AST  38  /  ALT  37  /  AlkPhos  178  /  GGT  x     01-12-25 @ 04:55        Urinalysis Basic - ( 12 Jan 2025 04:55 )    Color: x / Appearance: x / SG: x / pH: x  Gluc: 131 mg/dL / Ketone: x  / Bili: x / Urobili: x   Blood: x / Protein: x / Nitrite: x   Leuk Esterase: x / RBC: x / WBC x   Sq Epi: x / Non Sq Epi: x / Bacteria: x          IMAGING
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
## 2025-01-13 NOTE — CHART NOTE - NSCHARTNOTEFT_GEN_A_CORE
Call received from unit, family wants to make CMO. Spoke with daughter and answered all of her questions- she agrees to CMO.     Rec:   - no labs  - no IVF  - no artificial feeding  - no vitals  - no pulse ox  - no 02 supplementation beyond NC  - no injections  - no FS  - comfort feedings OK    Morphine 2 mg IVP Q 2 H PRN for pain or dyspnea  Ativan 0.5 mg IV Q4h PRN for agitation and anxiety  Glycopyrrolate 0.4 mg IV Q6h PRN for secretions  would continue AED meds, imodium, lasix, for comfort.   Will re-assess stability for d/c tomorrow.   Call palliative with any questions.

## 2025-01-13 NOTE — PROVIDER CONTACT NOTE (OTHER) - SITUATION
Patient bladder scanned. Scan showed 334 ml in bladder. MD Stephensly notified.
Pt noted with sp02 87% on 3L/min via nc. Pt noted with some labored breathing. .
MD Notified in person on unit; and notified via teams phone call.

## 2025-01-13 NOTE — PROVIDER CONTACT NOTE (OTHER) - ACTION/TREATMENT ORDERED:
No new orders at this time.
As per MD Varner, increase 02 to 5L/min via nc, cxr ordered, hold feeding until cxr completed.
Per MD Alejandrologly, no intervention per last bladder scan of 334 ml. Per MD Alejandrologmeredith, Patient to be bladder scanned in 4 hours and straight cath patient if scan is >350ml.

## 2025-01-13 NOTE — PROVIDER CONTACT NOTE (OTHER) - ASSESSMENT
No signs of pain noted on palpation of bladder. No bladder distention noted. VSS.
Patient noted to have increased work of breathing. Patient vitals taken and revealed the following:  BP: 145/65 T: 100.2 Axillary and RR of 22 with SPO2 of 87% on RA. Patient placed on nasal cannula 2L with improvement of O2 Saturation to 98%.
Pt noted with sp02 87% on 3L/min via nc. Pt noted with some labored breathing. . Pt AOx0. Hx of pna, b/l opacities on cxr, nonverbal hx sdh.

## 2025-01-13 NOTE — PROGRESS NOTE ADULT - PROVIDER SPECIALTY LIST ADULT
CCU
CCU
Critical Care
Internal Medicine
Nephrology
Internal Medicine
Internal Medicine
Hospitalist
Infectious Disease
Internal Medicine
Internal Medicine
Palliative Care
Infectious Disease
Palliative Care
Palliative Care

## 2025-01-13 NOTE — PROGRESS NOTE ADULT - ASSESSMENT
79F with PMH of MDS, DM, HTN, CKD2, stage 3 sacral ulcer, recent SDHs (s/p MME and evacuation) here with anemia at cancer center. Patient is nonverbal, bedbound, and with a PEG tube. s/p pRBC transfusion. Also on PO lasix, and on IV zosyn for possible infection. Seen by palliative care in the past, and daughter wanted full medical measures. Palliative consulted for GOC.    Reconsulted pt on 5 liters of oxygen tachypneic minimally responsive. Pt likely with infection was awaiting hospice and had ongoing medical management - asked to discuss CMO w family see above GOC note.     - DNR/DNI  - plan for hospice in SNF now pt is declining   - spoke to daughter she is aware of mom's decline in status is considering CMO  - Tylenol liquid ordered 650mg via PEG q 6 hrs PRN for pain 1-3  - Morphine Liquid 2mg via PEG Q 4 hrs PRN for pain/dyspnea  - will follow, call x [phone] 0or message this writer via teams as needed     - 50 min spent speaking to family and primary team as well as assessing end of life symptoms

## 2025-01-13 NOTE — PROGRESS NOTE ADULT - ATTENDING COMMENTS
Pt seen and examined. Case and Plan discussed at rounds.   Also discussed case with Palliative Care Attending Dr Morales.  Pt is clinically more hypoxic on 5L NC today however she is comfortable and is non-verbal.   Pt is waiting for Authorization to go to Hosp[facility] Care at [facility].  Palliative care to re-assess for possible CMO care as pt is clinically progressing hypoxia.     H/x: SDH, MDS, Anemia, Aspiration PNA s/p Zosyn course.     Plan: c/w with all care and f/u palliative care for cmo status discussions with family and lab draws decisions. DNR/DNI/Hospice     Dispo: Pending Ins Auth to [facility] / f/u Palliative discussions w/ family regarding cmo status / poor prognosis
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   Chronic bilateral effusions   CKD2 (Creat at baseline)  Fever due to possible UTI  Stage 3 sacral ulcer  Diarrhea  HTN  DM  HO SDH (sp evacuation and middle meningeal artery embolization complicated by a leaking craniotomy site and concern for status epilepticus)    Impression and plan above have been altered and are my own.
HPI as above.  Interval history: Pt seen and examined at bedside. No cp or sob.   Vital Signs (24 Hrs):  T(C): 36.9 (01-03-25 @ 05:24), Max: 38.1 (01-02-25 @ 16:00)  HR: 100 (01-03-25 @ 05:24) (100 - 114)  BP: 168/81 (01-03-25 @ 05:24) (145/65 - 183/76)  RR: 19 (01-03-25 @ 05:24) (19 - 42)  SpO2: 99% (01-03-25 @ 05:24) (87% - 100%)  Wt(kg): --  Daily     Daily     I&O's Summary    02 Jan 2025 07:01  -  03 Jan 2025 07:00  --------------------------------------------------------  IN: 450 mL / OUT: 740 mL / NET: -290 mL      PHYSICAL EXAM:  GENERAL: NAD, non verbal  HEAD:  Atraumatic, Normocephalic  EYES: EOMI, PERRLA, conjunctiva and sclera clear  NECK: Supple, No JVD  CHEST/LUNG: Clear to auscultation bilaterally; No wheeze  HEART: Regular rate and rhythm; No murmurs, rubs, or gallops  ABDOMEN: Soft, Nontender, Nondistended; Bowel sounds present, peg   EXTREMITIES:  2+ Peripheral Pulses, No clubbing, cyanosis, or edema  PSYCH: AAOx0  NEUROLOGY: non-focal  SKIN: No rashes or lesions  Labs reviewed  Imaging reviewed independently and reviewed read  < from: Xray Chest 1 View- PORTABLE-Routine (Xray Chest 1 View- PORTABLE-Routine in AM.) (01.03.25 @ 06:22) >    IMPRESSION:    Unchanged bilateral opacities and effusions.    < end of copied text >    < from: 12 Lead ECG (01.02.25 @ 07:21) >      Diagnosis Line Sinus tachycardia  Otherwise normal ECG    < end of copied text >    < from: 12 Lead ECG (01.02.25 @ 07:21) >      Diagnosis Line Sinus tachycardia  Otherwise normal ECG    < end of copied text >      Plan as above. DW resident. Agree to plan. Made edits    #Progress Note Handoff  Pending (specify):  pending palliative follow up for possible hospice   Family discussion: house staff updated pt family  Disposition: hospice possible, pending palliative   Decision to admit the pt is based on acuity as above

## 2025-01-13 NOTE — PROVIDER CONTACT NOTE (OTHER) - REASON
----- Message from NEEMA GALEANO MD sent at 10/5/2022 11:40 AM EDT -----  Regarding: Mail out  Could you please mail out order for renal function panel to be done in 2 weeks already placed the order thanks
Mailed out 
Pt noted with low pulse ox
Bladder scan
Arrival to unit 3B, SpO2 < 90 %; Tachycardia; Increased WOB

## 2025-01-13 NOTE — PROVIDER CONTACT NOTE (OTHER) - RECOMMENDATIONS
RN recommended higher level of care with cont monitoring.
MD Varner to be made aware, possible increase 02 to 5L/min via nc.
Re-insert ocampo cath, straight cath, or bladder scan patient again at a later time per MD orders. Per RN request, provider to RN to be put in for Q4 bladder scan.

## 2025-01-14 NOTE — DISCHARGE NOTE FOR THE EXPIRED PATIENT - HOSPITAL COURSE
79-year-old woman with PMHx of myelodysplastic syndrome (MDS) followed by Dr. Wade (requiring periodic transfusions for anemia), DM, HTN, CKD 2, and multiple recent admissions for a b/l subdural hematomas (SDH) with mass effect and midline shift (status-post evacuation and middle meningeal artery embolization complicated by a leaking craniotomy site and concern for status epilepticus) presented to the ED for anemia on 12/31. Patient was at [facility] center was found to be anemic to 5.6 being sent to ED for transfusion. Pt was transfused and Hb remained stable during the remainder of her hospitalization. Pt was nonverbal, bedbound, and had a PEG tube. Pt was immunocompromised 2/2 age, MDS, DM. During the hospitalization, pt developed fever and had leukocytosis likely 2/2 aspiration PNA and was given abx. Pt also had multiple chronic conditions all of which were addressed. Pt's family initially wanted hospice, however, yesterday decided to make her CMO. Pt's daughter, Jose, has been notified of the death.

## 2025-01-17 NOTE — CDI QUERY NOTE - NSCDIOTHERTXTBX_GEN_ALL_CORE_HH
Clinical documentation and/or evidence in the medical record indicates that this patient has sepsis.  In order to ensure accurate coding and accuracy of the clinical record, the documentation in this patient’s medical record requires additional clarification.      Please include more specific documentation as to the type/status of sepsis in your progress note and/or discharge summary.     Please clarify if the patient was found to have:         •	Sepsis ruled in        •	Sepsis ruled out after study                                                                                      •	Other (specify)    Supporting documentation and/or clinical evidence:     12/31 ED Provider Note: ... Principal Discharge DX: Sepsis due to urinary tract infection. Secondary Diagnosis: Hyperkalemia ... SEPSIS – was this patient treated for sepsis? Yes. Presentation suggestive of: Bacterial Etiology Suspicion of sepsis at: 31-Dec-2024 20:42    12/31 H&P Adult: ... Fever due to possible UTI    1/2 Consult Note Adult-Palliative Care Attending: ...  c/w care in CCU, higher risk in setting of comorbidities, septic on IV abx ... Problem/Recommendation - 1:·  Problem: Anemia. Problem/Recommendation - 2:·  Problem: Sepsis.     1/3 Progress Note Adult-Infectious Disease Attending: ... CHIEF COMPLAINT: sepsis ... Fever-  MRSA nares negative. Pyruia- Hard to differentiate if asymptomatic vs real infection. - Renal US 1/1 - no hydro - Urine Cx 12/31 NG ... onging fever - although no clear cause --  fever curve potentially improving ... continue zosyn 3.375 mg q 8 hours for now    1/4 Progress Note Adult-Hospitalist Attending: ... Fever due to possible UTI - resolved    1/6,1/8,1/9 Progress Note Adult-Internal Medicine Resident: ... Reason for Admission: sepsis    1/13 Progress Note Adult-Internal Medicine Resident/Attending: ... Pt is waiting for Authorization to go to Hospice Care at [facility]    Per MAR: NaCl 0.9% IVF 1000ml Bolus. Administered 12/31                Rocephin IVPB. Indication: genitourinary infection. Administered 12/31-1/1                Ampicillin IVPB. Indication: uti. Administered 1/1                Zosyn IVPB. Indication: genitourinary infection. Administered 1/1-1/11    Lab findings: Procalcitonin 1/1-0.65, Lactate 12/31-2.7    Nursing VS flowsheet: 12/31 Temp 99.5, 100.5, HR 93, RR 22    Per MD orders: Admit to Inpatient Level of Care-Diagnosis: Sepsis due to urinary tract infection; Hyperkalemia    For reference, please see the [facility] sepsis ED sepsis/severe sepsis management algorithm located on the [facility] Intranet         Thank you,  Kimberli Bagley RN [facility] CCDS  [phone]
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
## 2025-04-30 NOTE — PATIENT PROFILE ADULT - FALL HARM RISK - CONCLUSION
Mohs Case Number:  Biopsy Photograph Reviewed: Yes Referring Physician (Optional): mrs Consent Type: Consent 1 (Standard) Eye Shield Used: No Initial Size Of Lesion: 0.4 Number Of Stages: 1 Primary Defect Length In Cm (Final Defect Size - Required For Flaps/Grafts): 0.7 Primary Defect Width In Cm (Final Defect Size - Required For Flaps/Grafts): 0.8 Primary Defect Depth In Cm (Optional But Required For Some Insurers): 0 Fall with Harm Risk Repair Type: Repair performed by another provider Which Instrument Did You Use For Dermabrasion?: Wire Brush Which Eyelid Repair Cpt Are You Using?: 69999 Oculoplastic Surgeon Procedure Text (A): After obtaining clear surgical margins the patient was sent to oculoplastics for surgical repair.  The patient understands they will receive post-surgical care and follow-up from the referring physician's office. Otolaryngologist Procedure Text (A): After obtaining clear surgical margins the patient was sent to otolaryngology for surgical repair.  The patient understands they will receive post-surgical care and follow-up from the referring physician's office. Plastic Surgeon Procedure Text (A): After obtaining clear surgical margins the patient was sent to plastics for surgical repair.  The patient understands they will receive post-surgical care and follow-up from the referring physician's office. Mid-Level Procedure Text (A): After obtaining clear surgical margins the patient was sent to a mid-level provider for surgical repair.  The patient understands they will receive post-surgical care and follow-up from the mid-level provider. Which Provider Will Be Performing The Repair?: A Provider (A): Dr. Moreau Provider Procedure Text (A): After obtaining clear surgical margins the defect was repaired by another provider. Asc Procedure Text (A): After obtaining clear surgical margins the patient was sent to an ASC for surgical repair.  The patient understands they will receive post-surgical care and follow-up from the ASC physician. Deep Sutures: 5-0 Monocryl Epidermal Sutures: 5-0 Fast Absorbing Gut Suturegard Retention Suture: 2-0 Nylon Retention Suture Bite Size: 3 mm Length To Time In Minutes Device Was In Place: 10 Undermining Type: Entire Wound Debridement Text: The wound edges were debrided prior to proceeding with the closure to facilitate wound healing. Helical Rim Text: The closure involved the helical rim. Vermilion Border Text: The closure involved the vermilion border. Nostril Rim Text: The closure involved the nostril rim. Retention Suture Text: Retention sutures were placed to support the closure and prevent dehiscence. Patient Specific Indications Override: M Area H Indication Text: Tumors in this location are included in Area H (eyelids, eyebrows, nose, lips, chin, ear, pre-auricular, post-auricular, temple, genitalia, hands, feet, ankles and areola).  Tissue conservation is critical in these anatomic locations. Area M Indication Text: Tumors in this location are included in Area M (cheek, forehead, scalp, neck, jawline and pretibial skin).  Mohs surgery is indicated for tumors in these anatomic locations. Area L Indication Text: Tumors in this location are included in Area L (trunk and extremities).  Mohs surgery is indicated for larger tumors, or tumors with aggressive histologic features, in these anatomic locations. Depth Of Tumor Invasion (For Histology): tumor not visualized Perineural Invasion (For Histology - Be Specific If Possible): absent Presence Of Scar Tissue (For Histology): present Special Stains Stage 1 - Results: Base On Clearance Noted Above Stage 2: Additional Anesthesia Type: 1% lidocaine with epinephrine Staging Info: By selecting yes to the question above you will include information on AJCC 8 tumor staging in your Mohs note. Information on tumor staging will be automatically added for SCCs on the head and neck. AJCC 8 includes tumor size, tumor depth, perineural involvement and bone invasion. Tumor Depth: Less than 6mm from granular layer and no invasion beyond the subcutaneous fat Was The Patient On Physician Recommended Anticoagulation Therapy?: Please Select the Appropriate Response Medical Necessity Statement: Based on my medical judgement, Mohs surgery is the most appropriate treatment for this cancer compared to other treatments. Alternatives Discussed Intro (Do Not Add Period): I discussed alternative treatments to Mohs surgery and specifically discussed the risks and benefits of Consent 1/Introductory Paragraph: The rationale for Mohs was explained to the patient and consent was obtained. The risks, benefits and alternatives to therapy were discussed in detail. Specifically, the risks of infection, scarring, bleeding, prolonged wound healing, incomplete removal, allergy to anesthesia, nerve injury and recurrence were addressed. Prior to the procedure, the treatment site was clearly identified and confirmed by the patient. All components of Universal Protocol/PAUSE Rule completed. Consent 2/Introductory Paragraph: Mohs surgery was explained to the patient and consent was obtained. The risks, benefits and alternatives to therapy were discussed in detail. Specifically, the risks of infection, scarring, bleeding, prolonged wound healing, incomplete removal, allergy to anesthesia, nerve injury and recurrence were addressed. Prior to the procedure, the treatment site was clearly identified and confirmed by the patient. All components of Universal Protocol/PAUSE Rule completed. Consent 3/Introductory Paragraph: I gave the patient a chance to ask questions they had about the procedure.  Following this I explained the Mohs procedure and consent was obtained. The risks, benefits and alternatives to therapy were discussed in detail. Specifically, the risks of infection, scarring, bleeding, prolonged wound healing, incomplete removal, allergy to anesthesia, nerve injury and recurrence were addressed. Prior to the procedure, the treatment site was clearly identified and confirmed by the patient. All components of Universal Protocol/PAUSE Rule completed. Consent (Temporal Branch)/Introductory Paragraph: The rationale for Mohs was explained to the patient and consent was obtained. The risks, benefits and alternatives to therapy were discussed in detail. Specifically, the risks of damage to the temporal branch of the facial nerve, infection, scarring, bleeding, prolonged wound healing, incomplete removal, allergy to anesthesia, and recurrence were addressed. Prior to the procedure, the treatment site was clearly identified and confirmed by the patient. All components of Universal Protocol/PAUSE Rule completed. Consent (Marginal Mandibular)/Introductory Paragraph: The rationale for Mohs was explained to the patient and consent was obtained. The risks, benefits and alternatives to therapy were discussed in detail. Specifically, the risks of damage to the marginal mandibular branch of the facial nerve, infection, scarring, bleeding, prolonged wound healing, incomplete removal, allergy to anesthesia, and recurrence were addressed. Prior to the procedure, the treatment site was clearly identified and confirmed by the patient. All components of Universal Protocol/PAUSE Rule completed. Consent (Spinal Accessory)/Introductory Paragraph: The rationale for Mohs was explained to the patient and consent was obtained. The risks, benefits and alternatives to therapy were discussed in detail. Specifically, the risks of damage to the spinal accessory nerve, infection, scarring, bleeding, prolonged wound healing, incomplete removal, allergy to anesthesia, and recurrence were addressed. Prior to the procedure, the treatment site was clearly identified and confirmed by the patient. All components of Universal Protocol/PAUSE Rule completed. Consent (Near Eyelid Margin)/Introductory Paragraph: The rationale for Mohs was explained to the patient and consent was obtained. The risks, benefits and alternatives to therapy were discussed in detail. Specifically, the risks of ectropion or eyelid deformity, infection, scarring, bleeding, prolonged wound healing, incomplete removal, allergy to anesthesia, nerve injury and recurrence were addressed. Prior to the procedure, the treatment site was clearly identified and confirmed by the patient. All components of Universal Protocol/PAUSE Rule completed. Consent (Ear)/Introductory Paragraph: The rationale for Mohs was explained to the patient and consent was obtained. The risks, benefits and alternatives to therapy were discussed in detail. Specifically, the risks of ear deformity, infection, scarring, bleeding, prolonged wound healing, incomplete removal, allergy to anesthesia, nerve injury and recurrence were addressed. Prior to the procedure, the treatment site was clearly identified and confirmed by the patient. All components of Universal Protocol/PAUSE Rule completed. Consent (Nose)/Introductory Paragraph: The rationale for Mohs was explained to the patient and consent was obtained. The risks, benefits and alternatives to therapy were discussed in detail. Specifically, the risks of nasal deformity, changes in the flow of air through the nose, infection, scarring, bleeding, prolonged wound healing, incomplete removal, allergy to anesthesia, nerve injury and recurrence were addressed. Prior to the procedure, the treatment site was clearly identified and confirmed by the patient. All components of Universal Protocol/PAUSE Rule completed. Consent (Lip)/Introductory Paragraph: The rationale for Mohs was explained to the patient and consent was obtained. The risks, benefits and alternatives to therapy were discussed in detail. Specifically, the risks of lip deformity, changes in the oral aperture, infection, scarring, bleeding, prolonged wound healing, incomplete removal, allergy to anesthesia, nerve injury and recurrence were addressed. Prior to the procedure, the treatment site was clearly identified and confirmed by the patient. All components of Universal Protocol/PAUSE Rule completed. Consent (Scalp)/Introductory Paragraph: The rationale for Mohs was explained to the patient and consent was obtained. The risks, benefits and alternatives to therapy were discussed in detail. Specifically, the risks of changes in hair growth pattern secondary to repair, infection, scarring, bleeding, prolonged wound healing, incomplete removal, allergy to anesthesia, nerve injury and recurrence were addressed. Prior to the procedure, the treatment site was clearly identified and confirmed by the patient. All components of Universal Protocol/PAUSE Rule completed. Detail Level: Detailed Postop Diagnosis: same Anesthesia Type: 1% Xylocaine with [id number] epinephrine and sodium bicarbonate Anesthesia Volume In Cc: 3 Hemostasis: Electrodesiccation Estimated Blood Loss (Cc): minimal Brow Lift Text: A midfrontal incision was made medially to the defect to allow access to the tissues just superior to the left eyebrow. Following careful dissection inferiorly in a supraperiosteal plane to the level of the left eyebrow, several 3-0 monocryl sutures were used to resuspend the eyebrow orbicularis oculi muscular unit to the superior frontal bone periosteum. This resulted in an appropriate reapproximation of static eyebrow symmetry and correction of the left brow ptosis. Suturegard Intro: Intraoperative tissue expansion was performed, utilizing the SUTUREGARD device, in order to reduce wound tension. Suturegard Body: The suture ends were repeatedly re-tightened and re-clamped to achieve the desired tissue expansion. Hemigard Intro: Due to skin fragility and wound tension, it was decided to use HEMIGARD adhesive retention suture devices to permit a linear closure. The skin was cleaned and dried for a 6cm distance away from the wound. Excessive hair, if present, was removed to allow for adhesion. Hemigard Postcare Instructions: The HEMIGARD strips are to remain completely dry for at least 5-7 days. Donor Site Anesthesia Type: same as repair anesthesia Graft Basting Suture (Optional): 5-0 Prolene Epidermal Closure Graft Donor Site (Optional): running Graft Donor Site Bandage (Optional-Leave Blank If You Don't Want In Note): Steri-strips and a pressure bandage were applied to the donor site. Closure 2 Information: This tab is for additional flaps and grafts, including complex repair and grafts and complex repair and flaps. You can also specify a different location for the additional defect, if the location is the same you do not need to select a new one. We will insert the automated text for the repair you select below just as we do for solitary flaps and grafts. Please note that at this time if you select a location with a different insurance zone you will need to override the ICD10 and CPT if appropriate. Closure 3 Information: This tab is for additional flaps and grafts above and beyond our usual structured repairs.  Please note if you enter information here it will not currently bill and you will need to add the billing information manually. Wound Care: Petrolatum Dressing: pressure dressing Unna Boot Text: An Unna boot was placed to help immobilize the limb and facilitate more rapid healing. Home Suture Removal Text: Patient was provided instructions on removing sutures and will remove their sutures at home.  If they have any questions or difficulties they will call the office. Post-Care Instructions: I reviewed with the patient in detail post-care instructions. Patient is not to engage in any heavy lifting, exercise, or swimming for the next 14 days. Should the patient develop any fevers, chills, bleeding, severe pain patient will contact the office immediately. Pain Refusal Text: I offered to prescribe pain medication but the patient refused to take this medication. Mauc Instructions: By selecting yes to the question below the MAUC number will be added into the note.  This will be calculated automatically based on the diagnosis chosen, the size entered, the body zone selected (H,M,L) and the specific indications you chose. You will also have the option to override the Mohs AUC if you disagree with the automatically calculated number and this option is found in the Case Summary tab. Where Do You Want The Question To Include Opioid Counseling Located?: Case Summary Tab Eye Protection Verbiage: Before proceeding with the stage, a plastic scleral shield was inserted. The globe was anesthetized with a few drops of 1% lidocaine with 1:100,000 epinephrine. Then, an appropriate sized scleral shield was chosen and coated with lacrilube ointment. The shield was gently inserted and left in place for the duration of each stage. After the stage was completed, the shield was gently removed. Mohs Method Verbiage: An incision at a 45 degree angle following the standard Mohs approach was done and the specimen was harvested as a microscopic controlled layer. Surgeon/Pathologist Verbiage (Will Incorporate Name Of Surgeon From Intro If Not Blank): operated in two distinct and integrated capacities as the surgeon and pathologist. Mohs Histo Method Verbiage: Each section was then chromacoded and processed in the Mohs lab using the Mohs protocol and submitted for frozen section. Subsequent Stages Histo Method Verbiage: Using a similar technique to that described above, a thin layer of tissue was removed from all areas where tumor was visible on the previous stage.  The tissue was again oriented, mapped, dyed, and processed as above. Mohs Rapid Report Verbiage: The area of clinically evident tumor was marked with skin marking ink and appropriately hatched.  The initial incision was made following the Mohs approach through the skin.  The specimen was taken to the lab, divided into the necessary number of pieces, chromacoded and processed according to the Mohs protocol.  This was repeated in successive stages until a tumor free defect was achieved. Complex Repair Preamble Text (Leave Blank If You Do Not Want): Extensive wide undermining was performed. Intermediate Repair Preamble Text (Leave Blank If You Do Not Want): Undermining was performed with blunt dissection. Graft Cartilage Fenestration Text: The cartilage was fenestrated with a 2mm punch biopsy to help facilitate graft survival and healing. Non-Graft Cartilage Fenestration Text: The cartilage was fenestrated with a 2mm punch biopsy to help facilitate healing. Secondary Intention Text (Leave Blank If You Do Not Want): The defect will heal with secondary intention. No Repair - Repaired With Adjacent Surgical Defect Text (Leave Blank If You Do Not Want): After obtaining clear surgical margins the defect was repaired concurrently with another surgical defect which was in close approximation. Adjacent Tissue Transfer Text: The defect edges were debeveled with a #15 scalpel blade.  Given the location of the defect and the proximity to free margins an adjacent tissue transfer was deemed most appropriate.  Using a sterile surgical marker, an appropriate flap was drawn incorporating the defect and placing the expected incisions within the relaxed skin tension lines where possible.    The area thus outlined was incised deep to adipose tissue with a #15 scalpel blade.  The skin margins were undermined to an appropriate distance in all directions utilizing iris scissors. Advancement Flap (Single) Text: The defect edges were debeveled with a #15 scalpel blade.  Given the location of the defect and the proximity to free margins a single advancement flap was deemed most appropriate.  Using a sterile surgical marker, an appropriate advancement flap was drawn incorporating the defect and placing the expected incisions within the relaxed skin tension lines where possible.    The area thus outlined was incised deep to adipose tissue with a #15 scalpel blade.  The skin margins were undermined to an appropriate distance in all directions utilizing iris scissors. Advancement Flap (Double) Text: The defect edges were debeveled with a #15 scalpel blade.  Given the location of the defect and the proximity to free margins a double advancement flap was deemed most appropriate.  Using a sterile surgical marker, the appropriate advancement flaps were drawn incorporating the defect and placing the expected incisions within the relaxed skin tension lines where possible.    The area thus outlined was incised deep to adipose tissue with a #15 scalpel blade.  The skin margins were undermined to an appropriate distance in all directions utilizing iris scissors. Advancement-Rotation Flap Text: The defect edges were debeveled with a #15 scalpel blade.  Given the location of the defect, shape of the defect and the proximity to free margins an advancement-rotation flap was deemed most appropriate.  Using a sterile surgical marker, an appropriate flap was drawn incorporating the defect and placing the expected incisions within the relaxed skin tension lines where possible. The area thus outlined was incised deep to adipose tissue with a #15 scalpel blade.  The skin margins were undermined to an appropriate distance in all directions utilizing iris scissors. Alar Island Pedicle Flap Text: The defect edges were debeveled with a #15 scalpel blade.  Given the location of the defect, shape of the defect and the proximity to the alar rim an island pedicle advancement flap was deemed most appropriate.  Using a sterile surgical marker, an appropriate advancement flap was drawn incorporating the defect, outlining the appropriate donor tissue and placing the expected incisions within the nasal ala running parallel to the alar rim. The area thus outlined was incised with a #15 scalpel blade.  The skin margins were undermined minimally to an appropriate distance in all directions around the primary defect and laterally outward around the island pedicle utilizing iris scissors.  There was minimal undermining beneath the pedicle flap. A-T Advancement Flap Text: The defect edges were debeveled with a #15 scalpel blade.  Given the location of the defect, shape of the defect and the proximity to free margins an A-T advancement flap was deemed most appropriate.  Using a sterile surgical marker, an appropriate advancement flap was drawn incorporating the defect and placing the expected incisions within the relaxed skin tension lines where possible.    The area thus outlined was incised deep to adipose tissue with a #15 scalpel blade.  The skin margins were undermined to an appropriate distance in all directions utilizing iris scissors. Banner Transposition Flap Text: The defect edges were debeveled with a #15 scalpel blade.  Given the location of the defect and the proximity to free margins a Banner transposition flap was deemed most appropriate.  Using a sterile surgical marker, an appropriate flap drawn around the defect. The area thus outlined was incised deep to adipose tissue with a #15 scalpel blade.  The skin margins were undermined to an appropriate distance in all directions utilizing iris scissors. Bilateral Helical Rim Advancement Flap Text: The defect edges were debeveled with a #15 blade scalpel.  Given the location of the defect and the proximity to free margins (helical rim) a bilateral helical rim advancement flap was deemed most appropriate.  Using a sterile surgical marker, the appropriate advancement flaps were drawn incorporating the defect and placing the expected incisions between the helical rim and antihelix where possible.  The area thus outlined was incised through and through with a #15 scalpel blade.  With a skin hook and iris scissors, the flaps were gently and sharply undermined and freed up. Bilateral Rotation Flap Text: The defect edges were debeveled with a #15 scalpel blade. Given the location of the defect, shape of the defect and the proximity to free margins a bilateral rotation flap was deemed most appropriate. Using a sterile surgical marker, an appropriate rotation flap was drawn incorporating the defect and placing the expected incisions within the relaxed skin tension lines where possible. The area thus outlined was incised deep to adipose tissue with a #15 scalpel blade. The skin margins were undermined to an appropriate distance in all directions utilizing iris scissors. Following this, the designed flap was carried over into the primary defect and sutured into place. Bilobed Flap Text: The defect edges were debeveled with a #15 scalpel blade.  Given the location of the defect and the proximity to free margins a bilobe flap was deemed most appropriate.  Using a sterile surgical marker, an appropriate bilobe flap drawn around the defect.    The area thus outlined was incised deep to adipose tissue with a #15 scalpel blade.  The skin margins were undermined to an appropriate distance in all directions utilizing iris scissors. Bilobed Transposition Flap Text: The defect edges were debeveled with a #15 scalpel blade.  Given the location of the defect and the proximity to free margins a bilobed transposition flap was deemed most appropriate.  Using a sterile surgical marker, an appropriate bilobe flap drawn around the defect.    The area thus outlined was incised deep to adipose tissue with a #15 scalpel blade.  The skin margins were undermined to an appropriate distance in all directions utilizing iris scissors. Bi-Rhombic Flap Text: The defect edges were debeveled with a #15 scalpel blade.  Given the location of the defect and the proximity to free margins a bi-rhombic flap was deemed most appropriate.  Using a sterile surgical marker, an appropriate rhombic flap was drawn incorporating the defect. The area thus outlined was incised deep to adipose tissue with a #15 scalpel blade.  The skin margins were undermined to an appropriate distance in all directions utilizing iris scissors. Burow's Advancement Flap Text: The defect edges were debeveled with a #15 scalpel blade.  Given the location of the defect and the proximity to free margins a Burow's advancement flap was deemed most appropriate.  Using a sterile surgical marker, the appropriate advancement flap was drawn incorporating the defect and placing the expected incisions within the relaxed skin tension lines where possible.    The area thus outlined was incised deep to adipose tissue with a #15 scalpel blade.  The skin margins were undermined to an appropriate distance in all directions utilizing iris scissors. Chonodrocutaneous Helical Advancement Flap Text: The defect edges were debeveled with a #15 scalpel blade.  Given the location of the defect and the proximity to free margins a chondrocutaneous helical advancement flap was deemed most appropriate.  Using a sterile surgical marker, the appropriate advancement flap was drawn incorporating the defect and placing the expected incisions within the relaxed skin tension lines where possible.    The area thus outlined was incised deep to adipose tissue with a #15 scalpel blade.  The skin margins were undermined to an appropriate distance in all directions utilizing iris scissors. Crescentic Advancement Flap Text: The defect edges were debeveled with a #15 scalpel blade.  Given the location of the defect and the proximity to free margins a crescentic advancement flap was deemed most appropriate.  Using a sterile surgical marker, the appropriate advancement flap was drawn incorporating the defect and placing the expected incisions within the relaxed skin tension lines where possible.    The area thus outlined was incised deep to adipose tissue with a #15 scalpel blade.  The skin margins were undermined to an appropriate distance in all directions utilizing iris scissors. Dorsal Nasal Flap Text: The defect edges were debeveled with a #15 scalpel blade.  Given the location of the defect and the proximity to free margins a dorsal nasal flap was deemed most appropriate.  Using a sterile surgical marker, an appropriate dorsal nasal flap was drawn around the defect.    The area thus outlined was incised deep to adipose tissue with a #15 scalpel blade.  The skin margins were undermined to an appropriate distance in all directions utilizing iris scissors. Double Island Pedicle Flap Text: The defect edges were debeveled with a #15 scalpel blade.  Given the location of the defect, shape of the defect and the proximity to free margins a double island pedicle advancement flap was deemed most appropriate.  Using a sterile surgical marker, an appropriate advancement flap was drawn incorporating the defect, outlining the appropriate donor tissue and placing the expected incisions within the relaxed skin tension lines where possible.    The area thus outlined was incised deep to adipose tissue with a #15 scalpel blade.  The skin margins were undermined to an appropriate distance in all directions around the primary defect and laterally outward around the island pedicle utilizing iris scissors.  There was minimal undermining beneath the pedicle flap. Double O-Z Flap Text: The defect edges were debeveled with a #15 scalpel blade.  Given the location of the defect, shape of the defect and the proximity to free margins a Double O-Z flap was deemed most appropriate.  Using a sterile surgical marker, an appropriate transposition flap was drawn incorporating the defect and placing the expected incisions within the relaxed skin tension lines where possible. The area thus outlined was incised deep to adipose tissue with a #15 scalpel blade.  The skin margins were undermined to an appropriate distance in all directions utilizing iris scissors. Double O-Z Plasty Text: The defect edges were debeveled with a #15 scalpel blade.  Given the location of the defect, shape of the defect and the proximity to free margins a Double O-Z plasty (double transposition flap) was deemed most appropriate.  Using a sterile surgical marker, the appropriate transposition flaps were drawn incorporating the defect and placing the expected incisions within the relaxed skin tension lines where possible. The area thus outlined was incised deep to adipose tissue with a #15 scalpel blade.  The skin margins were undermined to an appropriate distance in all directions utilizing iris scissors.  Hemostasis was achieved with electrocautery.  The flaps were then transposed into place, one clockwise and the other counterclockwise, and anchored with interrupted buried subcutaneous sutures. Double Z Plasty Text: The lesion was extirpated to the level of the fat with a #15 scalpel blade. Given the location of the defect, shape of the defect and the proximity to free margins a double Z-plasty was deemed most appropriate for repair. Using a sterile surgical marker, the appropriate transposition arms of the double Z-plasty were drawn incorporating the defect and placing the expected incisions within the relaxed skin tension lines where possible. The area thus outlined was incised deep to adipose tissue with a #15 scalpel blade. The skin margins were undermined to an appropriate distance in all directions utilizing iris scissors. The opposing transposition arms were then transposed and carried over into place in opposite direction and anchored with interrupted buried subcutaneous sutures. Ear Star Wedge Flap Text: The defect edges were debeveled with a #15 blade scalpel.  Given the location of the defect and the proximity to free margins (helical rim) an ear star wedge flap was deemed most appropriate.  Using a sterile surgical marker, the appropriate flap was drawn incorporating the defect and placing the expected incisions between the helical rim and antihelix where possible.  The area thus outlined was incised through and through with a #15 scalpel blade. Flip-Flop Flap Text: The defect edges were debeveled with a #15 blade scalpel.  Given the location of the defect and the proximity to free margins a flip-flop flap was deemed most appropriate. Using a sterile surgical marker, the appropriate flap was drawn incorporating the defect and placing the expected incisions between the helical rim and antihelix where possible.  The area thus outlined was incised through and through with a #15 scalpel blade. Following this, the designed flap was carried over into the primary defect and sutured into place. Hatchet Flap Text: The defect edges were debeveled with a #15 scalpel blade.  Given the location of the defect, shape of the defect and the proximity to free margins a hatchet flap was deemed most appropriate.  Using a sterile surgical marker, an appropriate hatchet flap was drawn incorporating the defect and placing the expected incisions within the relaxed skin tension lines where possible.    The area thus outlined was incised deep to adipose tissue with a #15 scalpel blade.  The skin margins were undermined to an appropriate distance in all directions utilizing iris scissors. Helical Rim Advancement Flap Text: The defect edges were debeveled with a #15 blade scalpel.  Given the location of the defect and the proximity to free margins (helical rim) a double helical rim advancement flap was deemed most appropriate.  Using a sterile surgical marker, the appropriate advancement flaps were drawn incorporating the defect and placing the expected incisions between the helical rim and antihelix where possible.  The area thus outlined was incised through and through with a #15 scalpel blade.  With a skin hook and iris scissors, the flaps were gently and sharply undermined and freed up. H Plasty Text: Given the location of the defect, shape of the defect and the proximity to free margins a H-plasty was deemed most appropriate for repair.  Using a sterile surgical marker, the appropriate advancement arms of the H-plasty were drawn incorporating the defect and placing the expected incisions within the relaxed skin tension lines where possible. The area thus outlined was incised deep to adipose tissue with a #15 scalpel blade. The skin margins were undermined to an appropriate distance in all directions utilizing iris scissors.  The opposing advancement arms were then advanced into place in opposite direction and anchored with interrupted buried subcutaneous sutures. Island Pedicle Flap Text: The defect edges were debeveled with a #15 scalpel blade.  Given the location of the defect, shape of the defect and the proximity to free margins an island pedicle advancement flap was deemed most appropriate.  Using a sterile surgical marker, an appropriate advancement flap was drawn incorporating the defect, outlining the appropriate donor tissue and placing the expected incisions within the relaxed skin tension lines where possible.    The area thus outlined was incised deep to adipose tissue with a #15 scalpel blade.  The skin margins were undermined to an appropriate distance in all directions around the primary defect and laterally outward around the island pedicle utilizing iris scissors.  There was minimal undermining beneath the pedicle flap. Island Pedicle Flap With Canthal Suspension Text: The defect edges were debeveled with a #15 scalpel blade.  Given the location of the defect, shape of the defect and the proximity to free margins an island pedicle advancement flap was deemed most appropriate.  Using a sterile surgical marker, an appropriate advancement flap was drawn incorporating the defect, outlining the appropriate donor tissue and placing the expected incisions within the relaxed skin tension lines where possible. The area thus outlined was incised deep to adipose tissue with a #15 scalpel blade.  The skin margins were undermined to an appropriate distance in all directions around the primary defect and laterally outward around the island pedicle utilizing iris scissors.  There was minimal undermining beneath the pedicle flap. A suspension suture was placed in the canthal tendon to prevent tension and prevent ectropion. Island Pedicle Flap-Requiring Vessel Identification Text: The defect edges were debeveled with a #15 scalpel blade.  Given the location of the defect, shape of the defect and the proximity to free margins an island pedicle advancement flap was deemed most appropriate.  Using a sterile surgical marker, an appropriate advancement flap was drawn, based on the axial vessel mentioned above, incorporating the defect, outlining the appropriate donor tissue and placing the expected incisions within the relaxed skin tension lines where possible.    The area thus outlined was incised deep to adipose tissue with a #15 scalpel blade.  The skin margins were undermined to an appropriate distance in all directions around the primary defect and laterally outward around the island pedicle utilizing iris scissors.  There was minimal undermining beneath the pedicle flap. Keystone Flap Text: The defect edges were debeveled with a #15 scalpel blade.  Given the location of the defect, shape of the defect a keystone flap was deemed most appropriate.  Using a sterile surgical marker, an appropriate keystone flap was drawn incorporating the defect, outlining the appropriate donor tissue and placing the expected incisions within the relaxed skin tension lines where possible. The area thus outlined was incised deep to adipose tissue with a #15 scalpel blade.  The skin margins were undermined to an appropriate distance in all directions around the primary defect and laterally outward around the flap utilizing iris scissors. Melolabial Transposition Flap Text: The defect edges were debeveled with a #15 scalpel blade.  Given the location of the defect and the proximity to free margins a melolabial flap was deemed most appropriate.  Using a sterile surgical marker, an appropriate melolabial transposition flap was drawn incorporating the defect.    The area thus outlined was incised deep to adipose tissue with a #15 scalpel blade.  The skin margins were undermined to an appropriate distance in all directions utilizing iris scissors. Mercedes Flap Text: The defect edges were debeveled with a #15 scalpel blade.  Given the location of the defect, shape of the defect and the proximity to free margins a Mercedes flap was deemed most appropriate.  Using a sterile surgical marker, an appropriate advancement flap was drawn incorporating the defect and placing the expected incisions within the relaxed skin tension lines where possible. The area thus outlined was incised deep to adipose tissue with a #15 scalpel blade.  The skin margins were undermined to an appropriate distance in all directions utilizing iris scissors. Modified Advancement Flap Text: The defect edges were debeveled with a #15 scalpel blade.  Given the location of the defect, shape of the defect and the proximity to free margins a modified advancement flap was deemed most appropriate.  Using a sterile surgical marker, an appropriate advancement flap was drawn incorporating the defect and placing the expected incisions within the relaxed skin tension lines where possible.    The area thus outlined was incised deep to adipose tissue with a #15 scalpel blade.  The skin margins were undermined to an appropriate distance in all directions utilizing iris scissors. Mucosal Advancement Flap Text: Given the location of the defect, shape of the defect and the proximity to free margins a mucosal advancement flap was deemed most appropriate. Incisions were made with a 15 blade scalpel in the appropriate fashion along the cutaneous vermilion border and the mucosal lip. The remaining actinically damaged mucosal tissue was excised.  The mucosal advancement flap was then elevated to the gingival sulcus with care taken to preserve the neurovascular structures and advanced into the primary defect. Care was taken to ensure that precise realignment of the vermilion border was achieved. Muscle Hinge Flap Text: The defect edges were debeveled with a #15 scalpel blade.  Given the size, depth and location of the defect and the proximity to free margins a muscle hinge flap was deemed most appropriate.  Using a sterile surgical marker, an appropriate hinge flap was drawn incorporating the defect. The area thus outlined was incised with a #15 scalpel blade.  The skin margins were undermined to an appropriate distance in all directions utilizing iris scissors. Mustarde Flap Text: The defect edges were debeveled with a #15 scalpel blade.  Given the size, depth and location of the defect and the proximity to free margins a Mustarde flap was deemed most appropriate.  Using a sterile surgical marker, an appropriate flap was drawn incorporating the defect. The area thus outlined was incised with a #15 scalpel blade.  The skin margins were undermined to an appropriate distance in all directions utilizing iris scissors. Nasal Turnover Hinge Flap Text: The defect edges were debeveled with a #15 scalpel blade.  Given the size, depth, location of the defect and the defect being full thickness a nasal turnover hinge flap was deemed most appropriate.  Using a sterile surgical marker, an appropriate hinge flap was drawn incorporating the defect. The area thus outlined was incised with a #15 scalpel blade. The flap was designed to recreate the nasal mucosal lining and the alar rim. The skin margins were undermined to an appropriate distance in all directions utilizing iris scissors. Nasalis-Muscle-Based Myocutaneous Island Pedicle Flap Text: Using a #15 blade, an incision was made around the donor flap to the level of the nasalis muscle. Wide lateral undermining was then performed in both the subcutaneous plane above the nasalis muscle, and in a submuscular plane just above periosteum. This allowed the formation of a free nasalis muscle axial pedicle (based on the angular artery) which was still attached to the actual cutaneous flap, increasing its mobility and vascular viability. Hemostasis was obtained with pinpoint electrocoagulation. The flap was mobilized into position and the pivotal anchor points positioned and stabilized with buried interrupted sutures. Subcutaneous and dermal tissues were closed in a multilayered fashion with sutures. Tissue redundancies were excised, and the epidermal edges were apposed without significant tension and sutured with sutures. Nasalis Myocutaneous Flap Text: Using a #15 blade, an incision was made around the donor flap to the level of the nasalis muscle. Wide lateral undermining was then performed in both the subcutaneous plane above the nasalis muscle, and in a submuscular plane just above periosteum. This allowed the formation of a free nasalis muscle axial pedicle which was still attached to the actual cutaneous flap, increasing its mobility and vascular viability. Hemostasis was obtained with pinpoint electrocoagulation. The flap was mobilized into position and the pivotal anchor points positioned and stabilized with buried interrupted sutures. Subcutaneous and dermal tissues were closed in a multilayered fashion with sutures. Tissue redundancies were excised, and the epidermal edges were apposed without significant tension and sutured with sutures. Nasolabial Transposition Flap Text: The defect edges were debeveled with a #15 scalpel blade.  Given the size, depth and location of the defect and the proximity to free margins a nasolabial transposition flap was deemed most appropriate. Using a sterile surgical marker, an appropriate flap was drawn incorporating the defect. The area thus outlined was incised with a #15 scalpel blade. The skin margins were undermined to an appropriate distance in all directions utilizing iris scissors. Following this, the designed flap was carried into the primary defect and sutured into place. Orbicularis Oris Muscle Flap Text: The defect edges were debeveled with a #15 scalpel blade.  Given that the defect affected the competency of the oral sphincter an obicularis oris muscle flap was deemed most appropriate to restore this competency and normal muscle function.  Using a sterile surgical marker, an appropriate flap was drawn incorporating the defect. The area thus outlined was incised with a #15 scalpel blade. O-T Advancement Flap Text: The defect edges were debeveled with a #15 scalpel blade.  Given the location of the defect, shape of the defect and the proximity to free margins an O-T advancement flap was deemed most appropriate.  Using a sterile surgical marker, an appropriate advancement flap was drawn incorporating the defect and placing the expected incisions within the relaxed skin tension lines where possible.    The area thus outlined was incised deep to adipose tissue with a #15 scalpel blade.  The skin margins were undermined to an appropriate distance in all directions utilizing iris scissors. O-T Plasty Text: The defect edges were debeveled with a #15 scalpel blade.  Given the location of the defect, shape of the defect and the proximity to free margins an O-T plasty was deemed most appropriate.  Using a sterile surgical marker, an appropriate O-T plasty was drawn incorporating the defect and placing the expected incisions within the relaxed skin tension lines where possible.    The area thus outlined was incised deep to adipose tissue with a #15 scalpel blade.  The skin margins were undermined to an appropriate distance in all directions utilizing iris scissors. O-L Flap Text: The defect edges were debeveled with a #15 scalpel blade.  Given the location of the defect, shape of the defect and the proximity to free margins an O-L flap was deemed most appropriate.  Using a sterile surgical marker, an appropriate advancement flap was drawn incorporating the defect and placing the expected incisions within the relaxed skin tension lines where possible.    The area thus outlined was incised deep to adipose tissue with a #15 scalpel blade.  The skin margins were undermined to an appropriate distance in all directions utilizing iris scissors. O-Z Flap Text: The defect edges were debeveled with a #15 scalpel blade.  Given the location of the defect, shape of the defect and the proximity to free margins an O-Z flap was deemed most appropriate.  Using a sterile surgical marker, an appropriate transposition flap was drawn incorporating the defect and placing the expected incisions within the relaxed skin tension lines where possible. The area thus outlined was incised deep to adipose tissue with a #15 scalpel blade.  The skin margins were undermined to an appropriate distance in all directions utilizing iris scissors. O-Z Plasty Text: The defect edges were debeveled with a #15 scalpel blade.  Given the location of the defect, shape of the defect and the proximity to free margins an O-Z plasty (double transposition flap) was deemed most appropriate.  Using a sterile surgical marker, the appropriate transposition flaps were drawn incorporating the defect and placing the expected incisions within the relaxed skin tension lines where possible.    The area thus outlined was incised deep to adipose tissue with a #15 scalpel blade.  The skin margins were undermined to an appropriate distance in all directions utilizing iris scissors.  Hemostasis was achieved with electrocautery.  The flaps were then transposed into place, one clockwise and the other counterclockwise, and anchored with interrupted buried subcutaneous sutures. Peng Advancement Flap Text: The defect edges were debeveled with a #15 scalpel blade.  Given the location of the defect, shape of the defect and the proximity to free margins a Peng advancement flap was deemed most appropriate.  Using a sterile surgical marker, an appropriate advancement flap was drawn incorporating the defect and placing the expected incisions within the relaxed skin tension lines where possible. The area thus outlined was incised deep to adipose tissue with a #15 scalpel blade.  The skin margins were undermined to an appropriate distance in all directions utilizing iris scissors. Rectangular Flap Text: The defect edges were debeveled with a #15 scalpel blade. Given the location of the defect and the proximity to free margins a rectangular flap was deemed most appropriate. Using a sterile surgical marker, an appropriate rectangular flap was drawn incorporating the defect. The area thus outlined was incised deep to adipose tissue with a #15 scalpel blade. The skin margins were undermined to an appropriate distance in all directions utilizing iris scissors. Following this, the designed flap was carried over into the primary defect and sutured into place. Rhombic Flap Text: The defect edges were debeveled with a #15 scalpel blade.  Given the location of the defect and the proximity to free margins a rhombic flap was deemed most appropriate.  Using a sterile surgical marker, an appropriate rhombic flap was drawn incorporating the defect.    The area thus outlined was incised deep to adipose tissue with a #15 scalpel blade.  The skin margins were undermined to an appropriate distance in all directions utilizing iris scissors. Rhomboid Transposition Flap Text: The defect edges were debeveled with a #15 scalpel blade.  Given the location of the defect and the proximity to free margins a rhomboid transposition flap was deemed most appropriate.  Using a sterile surgical marker, an appropriate rhomboid flap was drawn incorporating the defect.    The area thus outlined was incised deep to adipose tissue with a #15 scalpel blade.  The skin margins were undermined to an appropriate distance in all directions utilizing iris scissors. Rotation Flap Text: The defect edges were debeveled with a #15 scalpel blade.  Given the location of the defect, shape of the defect and the proximity to free margins a rotation flap was deemed most appropriate.  Using a sterile surgical marker, an appropriate rotation flap was drawn incorporating the defect and placing the expected incisions within the relaxed skin tension lines where possible.    The area thus outlined was incised deep to adipose tissue with a #15 scalpel blade.  The skin margins were undermined to an appropriate distance in all directions utilizing iris scissors. Spiral Flap Text: The defect edges were debeveled with a #15 scalpel blade.  Given the location of the defect, shape of the defect and the proximity to free margins a spiral flap was deemed most appropriate.  Using a sterile surgical marker, an appropriate rotation flap was drawn incorporating the defect and placing the expected incisions within the relaxed skin tension lines where possible. The area thus outlined was incised deep to adipose tissue with a #15 scalpel blade.  The skin margins were undermined to an appropriate distance in all directions utilizing iris scissors. Staged Advancement Flap Text: The defect edges were debeveled with a #15 scalpel blade.  Given the location of the defect, shape of the defect and the proximity to free margins a staged advancement flap was deemed most appropriate.  Using a sterile surgical marker, an appropriate advancement flap was drawn incorporating the defect and placing the expected incisions within the relaxed skin tension lines where possible. The area thus outlined was incised deep to adipose tissue with a #15 scalpel blade.  The skin margins were undermined to an appropriate distance in all directions utilizing iris scissors. Star Wedge Flap Text: The defect edges were debeveled with a #15 scalpel blade.  Given the location of the defect, shape of the defect and the proximity to free margins a star wedge flap was deemed most appropriate.  Using a sterile surgical marker, an appropriate rotation flap was drawn incorporating the defect and placing the expected incisions within the relaxed skin tension lines where possible. The area thus outlined was incised deep to adipose tissue with a #15 scalpel blade.  The skin margins were undermined to an appropriate distance in all directions utilizing iris scissors. Transposition Flap Text: The defect edges were debeveled with a #15 scalpel blade.  Given the location of the defect and the proximity to free margins a transposition flap was deemed most appropriate.  Using a sterile surgical marker, an appropriate transposition flap was drawn incorporating the defect.    The area thus outlined was incised deep to adipose tissue with a #15 scalpel blade.  The skin margins were undermined to an appropriate distance in all directions utilizing iris scissors. Trilobed Flap Text: The defect edges were debeveled with a #15 scalpel blade.  Given the location of the defect and the proximity to free margins a trilobed flap was deemed most appropriate.  Using a sterile surgical marker, an appropriate trilobed flap drawn around the defect.    The area thus outlined was incised deep to adipose tissue with a #15 scalpel blade.  The skin margins were undermined to an appropriate distance in all directions utilizing iris scissors. V-Y Flap Text: The defect edges were debeveled with a #15 scalpel blade.  Given the location of the defect, shape of the defect and the proximity to free margins a V-Y flap was deemed most appropriate.  Using a sterile surgical marker, an appropriate advancement flap was drawn incorporating the defect and placing the expected incisions within the relaxed skin tension lines where possible.    The area thus outlined was incised deep to adipose tissue with a #15 scalpel blade.  The skin margins were undermined to an appropriate distance in all directions utilizing iris scissors. V-Y Plasty Text: The defect edges were debeveled with a #15 scalpel blade.  Given the location of the defect, shape of the defect and the proximity to free margins an V-Y advancement flap was deemed most appropriate.  Using a sterile surgical marker, an appropriate advancement flap was drawn incorporating the defect and placing the expected incisions within the relaxed skin tension lines where possible.    The area thus outlined was incised deep to adipose tissue with a #15 scalpel blade.  The skin margins were undermined to an appropriate distance in all directions utilizing iris scissors. W Plasty Text: The lesion was extirpated to the level of the fat with a #15 scalpel blade.  Given the location of the defect, shape of the defect and the proximity to free margins a W-plasty was deemed most appropriate for repair.  Using a sterile surgical marker, the appropriate transposition arms of the W-plasty were drawn incorporating the defect and placing the expected incisions within the relaxed skin tension lines where possible.    The area thus outlined was incised deep to adipose tissue with a #15 scalpel blade.  The skin margins were undermined to an appropriate distance in all directions utilizing iris scissors.  The opposing transposition arms were then transposed into place in opposite direction and anchored with interrupted buried subcutaneous sutures. Z Plasty Text: The lesion was extirpated to the level of the fat with a #15 scalpel blade.  Given the location of the defect, shape of the defect and the proximity to free margins a Z-plasty was deemed most appropriate for repair.  Using a sterile surgical marker, the appropriate transposition arms of the Z-plasty were drawn incorporating the defect and placing the expected incisions within the relaxed skin tension lines where possible.    The area thus outlined was incised deep to adipose tissue with a #15 scalpel blade.  The skin margins were undermined to an appropriate distance in all directions utilizing iris scissors.  The opposing transposition arms were then transposed into place in opposite direction and anchored with interrupted buried subcutaneous sutures. Zygomaticofacial Flap Text: Given the location of the defect, shape of the defect and the proximity to free margins a zygomaticofacial flap was deemed most appropriate for repair.  Using a sterile surgical marker, the appropriate flap was drawn incorporating the defect and placing the expected incisions within the relaxed skin tension lines where possible. The area thus outlined was incised deep to adipose tissue with a #15 scalpel blade with preservation of a vascular pedicle.  The skin margins were undermined to an appropriate distance in all directions utilizing iris scissors.  The flap was then placed into the defect and anchored with interrupted buried subcutaneous sutures. Abbe Flap (Lower To Upper Lip) Text: The defect of the upper lip was assessed and measured.  Given the location and size of the defect, an Abbe flap was deemed most appropriate.  Using a sterile surgical marker, an appropriate Abbe flap was measured and drawn on the lower lip. Local anesthesia was then infiltrated. A scalpel was then used to incise the upper lip through and through the skin, vermilion, muscle and mucosa, leaving the flap pedicled on the opposite side.  The flap was then rotated and transferred to the lower lip defect.  The flap was then sutured into place with a three layer technique, closing the orbicularis oris muscle layer with subcutaneous buried sutures, followed by a mucosal layer and an epidermal layer. Abbe Flap (Upper To Lower Lip) Text: The defect of the lower lip was assessed and measured.  Given the location and size of the defect, an Abbe flap was deemed most appropriate.  Using a sterile surgical marker, an appropriate Abbe flap was measured and drawn on the upper lip. Local anesthesia was then infiltrated.  A scalpel was then used to incise the upper lip through and through the skin, vermilion, muscle and mucosa, leaving the flap pedicled on the opposite side.  The flap was then rotated and transferred to the lower lip defect.  The flap was then sutured into place with a three layer technique, closing the orbicularis oris muscle layer with subcutaneous buried sutures, followed by a mucosal layer and an epidermal layer. Cheek Interpolation Flap Text: A decision was made to reconstruct the defect utilizing an interpolation axial flap and a staged reconstruction.  A telfa template was made of the defect.  This telfa template was then used to outline the Cheek Interpolation flap.  The donor area for the pedicle flap was then injected with anesthesia.  The flap was excised through the skin and subcutaneous tissue down to the layer of the underlying musculature.  The interpolation flap was carefully excised within this deep plane to maintain its blood supply.  The edges of the donor site were undermined.   The donor site was closed in a primary fashion.  The pedicle was then rotated into position and sutured.  Once the tube was sutured into place, adequate blood supply was confirmed with blanching and refill.  The pedicle was then wrapped with xeroform gauze and dressed appropriately with a telfa and gauze bandage to ensure continued blood supply and protect the attached pedicle. Cheek-To-Nose Interpolation Flap Text: A decision was made to reconstruct the defect utilizing an interpolation axial flap and a staged reconstruction.  A telfa template was made of the defect.  This telfa template was then used to outline the Cheek-To-Nose Interpolation flap.  The donor area for the pedicle flap was then injected with anesthesia.  The flap was excised through the skin and subcutaneous tissue down to the layer of the underlying musculature.  The interpolation flap was carefully excised within this deep plane to maintain its blood supply.  The edges of the donor site were undermined.   The donor site was closed in a primary fashion.  The pedicle was then rotated into position and sutured.  Once the tube was sutured into place, adequate blood supply was confirmed with blanching and refill.  The pedicle was then wrapped with xeroform gauze and dressed appropriately with a telfa and gauze bandage to ensure continued blood supply and protect the attached pedicle. Estlander Flap (Lower To Upper Lip) Text: The defect of the lower lip was assessed and measured.  Given the location and size of the defect, an Estlander flap was deemed most appropriate.  Using a sterile surgical marker, an appropriate Estlander flap was measured and drawn on the upper lip. Local anesthesia was then infiltrated. A scalpel was then used to incise the lateral aspect of the flap, through skin, muscle and mucosa, leaving the flap pedicled medially.  The flap was then rotated and positioned to fill the lower lip defect.  The flap was then sutured into place with a three layer technique, closing the orbicularis oris muscle layer with subcutaneous buried sutures, followed by a mucosal layer and an epidermal layer. Interpolation Flap Text: A decision was made to reconstruct the defect utilizing an interpolation axial flap and a staged reconstruction.  A telfa template was made of the defect.  This telfa template was then used to outline the interpolation flap.  The donor area for the pedicle flap was then injected with anesthesia.  The flap was excised through the skin and subcutaneous tissue down to the layer of the underlying musculature.  The interpolation flap was carefully excised within this deep plane to maintain its blood supply.  The edges of the donor site were undermined.   The donor site was closed in a primary fashion.  The pedicle was then rotated into position and sutured.  Once the tube was sutured into place, adequate blood supply was confirmed with blanching and refill.  The pedicle was then wrapped with xeroform gauze and dressed appropriately with a telfa and gauze bandage to ensure continued blood supply and protect the attached pedicle. Melolabial Interpolation Flap Text: A decision was made to reconstruct the defect utilizing an interpolation axial flap and a staged reconstruction.  A telfa template was made of the defect.  This telfa template was then used to outline the melolabial interpolation flap.  The donor area for the pedicle flap was then injected with anesthesia.  The flap was excised through the skin and subcutaneous tissue down to the layer of the underlying musculature.  The pedicle flap was carefully excised within this deep plane to maintain its blood supply.  The edges of the donor site were undermined.   The donor site was closed in a primary fashion.  The pedicle was then rotated into position and sutured.  Once the tube was sutured into place, adequate blood supply was confirmed with blanching and refill.  The pedicle was then wrapped with xeroform gauze and dressed appropriately with a telfa and gauze bandage to ensure continued blood supply and protect the attached pedicle. Mastoid Interpolation Flap Text: A decision was made to reconstruct the defect utilizing an interpolation axial flap and a staged reconstruction.  A telfa template was made of the defect.  This telfa template was then used to outline the mastoid interpolation flap.  The donor area for the pedicle flap was then injected with anesthesia.  The flap was excised through the skin and subcutaneous tissue down to the layer of the underlying musculature.  The pedicle flap was carefully excised within this deep plane to maintain its blood supply.  The edges of the donor site were undermined.   The donor site was closed in a primary fashion.  The pedicle was then rotated into position and sutured.  Once the tube was sutured into place, adequate blood supply was confirmed with blanching and refill.  The pedicle was then wrapped with xeroform gauze and dressed appropriately with a telfa and gauze bandage to ensure continued blood supply and protect the attached pedicle. Paramedian Forehead Flap Text: A decision was made to reconstruct the defect utilizing an interpolation axial flap and a staged reconstruction.  A telfa template was made of the defect.  This telfa template was then used to outline the paramedian forehead pedicle flap.  The donor area for the pedicle flap was then injected with anesthesia.  The flap was excised through the skin and subcutaneous tissue down to the layer of the underlying musculature.  The pedicle flap was carefully excised within this deep plane to maintain its blood supply.  The edges of the donor site were undermined.   The donor site was closed in a primary fashion.  The pedicle was then rotated into position and sutured.  Once the tube was sutured into place, adequate blood supply was confirmed with blanching and refill.  The pedicle was then wrapped with xeroform gauze and dressed appropriately with a telfa and gauze bandage to ensure continued blood supply and protect the attached pedicle. Posterior Auricular Interpolation Flap Text: A decision was made to reconstruct the defect utilizing an interpolation axial flap and a staged reconstruction.  A telfa template was made of the defect.  This telfa template was then used to outline the posterior auricular interpolation flap.  The donor area for the pedicle flap was then injected with anesthesia.  The flap was excised through the skin and subcutaneous tissue down to the layer of the underlying musculature.  The pedicle flap was carefully excised within this deep plane to maintain its blood supply.  The edges of the donor site were undermined.   The donor site was closed in a primary fashion.  The pedicle was then rotated into position and sutured.  Once the tube was sutured into place, adequate blood supply was confirmed with blanching and refill.  The pedicle was then wrapped with xeroform gauze and dressed appropriately with a telfa and gauze bandage to ensure continued blood supply and protect the attached pedicle. Cheiloplasty (Complex) Text: A decision was made to reconstruct the defect with a  cheiloplasty.  The defect was undermined extensively.  Additional obicularis oris muscle was excised with a 15 blade scalpel.  The defect was converted into a full thickness wedge to facilite a better cosmetic result.  Small vessels were then tied off with 5-0 monocyrl. The obicularis oris, superficial fascia, adipose and dermis were then reapproximated.  After the deeper layers were approximated the epidermis was reapproximated with particular care given to realign the vermilion border. Cheiloplasty (Less Than 50%) Text: A decision was made to reconstruct the defect with a  cheiloplasty.  The defect was undermined extensively.  Additional obicularis oris muscle was excised with a 15 blade scalpel.  The defect was converted into a full thickness wedge, of less than 50% of the vertical height of the lip, to facilite a better cosmetic result.  Small vessels were then tied off with 5-0 monocyrl. The obicularis oris, superficial fascia, adipose and dermis were then reapproximated.  After the deeper layers were approximated the epidermis was reapproximated with particular care given to realign the vermilion border. Ear Wedge Repair Text: A wedge excision was completed by carrying down an excision through the full thickness of the ear and cartilage with an inward facing Burow's triangle. The wound was then closed in a layered fashion. Full Thickness Lip Wedge Repair (Flap) Text: Given the location of the defect and the proximity to free margins a full thickness wedge repair was deemed most appropriate.  Using a sterile surgical marker, the appropriate repair was drawn incorporating the defect and placing the expected incisions perpendicular to the vermilion border.  The vermilion border was also meticulously outlined to ensure appropriate reapproximation during the repair.  The area thus outlined was incised through and through with a #15 scalpel blade.  The muscularis and dermis were reaproximated with deep sutures following hemostasis. Care was taken to realign the vermilion border before proceeding with the superficial closure.  Once the vermilion was realigned the superfical and mucosal closure was finished. Burow's Graft Text: The defect edges were debeveled with a #15 scalpel blade.  Given the location of the defect, shape of the defect, the proximity to free margins and the presence of a standing cone deformity a Burow's skin graft was deemed most appropriate. The standing cone was removed and this tissue was then trimmed to the shape of the primary defect. The adipose tissue was also removed until only dermis and epidermis were left.  The skin margins of the secondary defect were undermined to an appropriate distance in all directions utilizing iris scissors.  The secondary defect was closed with interrupted buried subcutaneous sutures.  The skin edges were then re-apposed with running  sutures.  The skin graft was then placed in the primary defect and oriented appropriately. Cartilage Graft Text: The defect edges were debeveled with a #15 scalpel blade.  Given the location of the defect, shape of the defect, the fact the defect involved a full thickness cartilage defect a cartilage graft was deemed most appropriate.  An appropriate donor site was identified, cleansed, and anesthetized. The cartilage graft was then harvested and transferred to the recipient site, oriented appropriately and then sutured into place.  The secondary defect was then repaired using a primary closure. Composite Graft Text: The defect edges were debeveled with a #15 scalpel blade.  Given the location of the defect, shape of the defect, the proximity to free margins and the fact the defect was full thickness a composite graft was deemed most appropriate.  The defect was outline and then transferred to the donor site.  A full thickness graft was then excised from the donor site. The graft was then placed in the primary defect, oriented appropriately and then sutured into place.  The secondary defect was then repaired using a primary closure. Epidermal Autograft Text: The defect edges were debeveled with a #15 scalpel blade.  Given the location of the defect, shape of the defect and the proximity to free margins an epidermal autograft was deemed most appropriate.  Using a sterile surgical marker, the primary defect shape was transferred to the donor site. The epidermal graft was then harvested.  The skin graft was then placed in the primary defect and oriented appropriately. Dermal Autograft Text: The defect edges were debeveled with a #15 scalpel blade.  Given the location of the defect, shape of the defect and the proximity to free margins a dermal autograft was deemed most appropriate.  Using a sterile surgical marker, the primary defect shape was transferred to the donor site. The area thus outlined was incised deep to adipose tissue with a #15 scalpel blade.  The harvested graft was then trimmed of adipose and epidermal tissue until only dermis was left.  The skin graft was then placed in the primary defect and oriented appropriately. Ftsg Text: The defect edges were debeveled with a #15 scalpel blade.  Given the location of the defect, shape of the defect and the proximity to free margins a full thickness skin graft was deemed most appropriate.  Using a sterile surgical marker, the primary defect shape was transferred to the donor site. The area thus outlined was incised deep to adipose tissue with a #15 scalpel blade.  The harvested graft was then trimmed of adipose tissue until only dermis and epidermis was left.  The skin margins of the secondary defect were undermined to an appropriate distance in all directions utilizing iris scissors.  The secondary defect was closed with interrupted buried subcutaneous sutures.  The skin edges were then re-apposed with running  sutures.  The skin graft was then placed in the primary defect and oriented appropriately. Pinch Graft Text: The defect edges were debeveled with a #15 scalpel blade. Given the location of the defect, shape of the defect and the proximity to free margins a pinch graft was deemed most appropriate. Using a sterile surgical marker, the primary defect shape was transferred to the donor site. The area thus outlined was incised deep to adipose tissue with a #15 scalpel blade.  The harvested graft was then trimmed of adipose tissue until only dermis and epidermis was left. The skin margins of the secondary defect were undermined to an appropriate distance in all directions utilizing iris scissors.  The secondary defect was closed with interrupted buried subcutaneous sutures.  The skin edges were then re-apposed with running  sutures.  The skin graft was then placed in the primary defect and oriented appropriately. Skin Substitute Text: The defect edges were debeveled with a #15 scalpel blade.  Given the location of the defect, shape of the defect and the proximity to free margins a skin substitute graft was deemed most appropriate.  The graft material was trimmed to fit the size of the defect. The graft was then placed in the primary defect and oriented appropriately. Split-Thickness Skin Graft Text: The defect edges were debeveled with a #15 scalpel blade.  Given the location of the defect, shape of the defect and the proximity to free margins a split thickness skin graft was deemed most appropriate.  Using a sterile surgical marker, the primary defect shape was transferred to the donor site. The split thickness graft was then harvested.  The skin graft was then placed in the primary defect and oriented appropriately. Tissue Cultured Epidermal Autograft Text: The defect edges were debeveled with a #15 scalpel blade.  Given the location of the defect, shape of the defect and the proximity to free margins a tissue cultured epidermal autograft was deemed most appropriate.  The graft was then trimmed to fit the size of the defect.  The graft was then placed in the primary defect and oriented appropriately. Xenograft Text: The defect edges were debeveled with a #15 scalpel blade.  Given the location of the defect, shape of the defect and the proximity to free margins a xenograft was deemed most appropriate.  The graft was then trimmed to fit the size of the defect.  The graft was then placed in the primary defect and oriented appropriately. Complex Repair And Flap Additional Text (Will Appearing After The Standard Complex Repair Text): The complex repair was not sufficient to completely close the primary defect. The remaining additional defect was repaired with the flap mentioned below. Complex Repair And Graft Additional Text (Will Appearing After The Standard Complex Repair Text): The complex repair was not sufficient to completely close the primary defect. The remaining additional defect was repaired with the graft mentioned below. Eyelid Full Thickness Repair - [id number]: The eyelid defect was full thickness which required a wedge repair of the eyelid. Special care was taken to ensure that the eyelid margin was realligned when placing sutures. Eyelid Partial Thickness Repair - [address]: The eyelid defect was partial thickness which required a wedge repair of the eyelid. Special care was taken to ensure that the eyelid margin was realligned when placing sutures. Intermediate Repair And Flap Additional Text (Will Appearing After The Standard Complex Repair Text): The intermediate repair was not sufficient to completely close the primary defect. The remaining additional defect was repaired with the flap mentioned below. Intermediate Repair And Graft Additional Text (Will Appearing After The Standard Complex Repair Text): The intermediate repair was not sufficient to completely close the primary defect. The remaining additional defect was repaired with the graft mentioned below. Localized Dermabrasion With 15 Blade Text: The patient was draped in routine manner.  Localized dermabrasion using a 15 blade was performed in routine manner to papillary dermis. This spot dermabrasion is being performed to complete skin cancer reconstruction. It also will eliminate the other sun damaged precancerous cells that are known to be part of the regional effect of a lifetime's worth of sun exposure. This localized dermabrasion is therapeutic and should not be considered cosmetic in any regard. Localized Dermabrasion With Sand Papertext: The patient was draped in routine manner.  Localized dermabrasion using sterile sand paper was performed in routine manner to papillary dermis. This spot dermabrasion is being performed to complete skin cancer reconstruction. It also will eliminate the other sun damaged precancerous cells that are known to be part of the regional effect of a lifetime's worth of sun exposure. This localized dermabrasion is therapeutic and should not be considered cosmetic in any regard. Localized Dermabrasion With Wire Brush Text: The patient was draped in routine manner.  Localized dermabrasion using 3 x 17 mm wire brush was performed in routine manner to papillary dermis. This spot dermabrasion is being performed to complete skin cancer reconstruction. It also will eliminate the other sun damaged precancerous cells that are known to be part of the regional effect of a lifetime's worth of sun exposure. This localized dermabrasion is therapeutic and should not be considered cosmetic in any regard. Purse String (Simple) Text: Given the location of the defect and the characteristics of the surrounding skin a purse string closure was deemed most appropriate.  Undermining was performed circumfirentially around the surgical defect.  A purse string suture was then placed and tightened. Purse String (Intermediate) Text: Given the location of the defect and the characteristics of the surrounding skin a purse string intermediate closure was deemed most appropriate.  Undermining was performed circumfirentially around the surgical defect.  A purse string suture was then placed and tightened. Partial Purse String (Simple) Text: Given the location of the defect and the characteristics of the surrounding skin a simple purse string closure was deemed most appropriate.  Undermining was performed circumfirentially around the surgical defect.  A purse string suture was then placed and tightened. Wound tension only allowed a partial closure of the circular defect. Partial Purse String (Intermediate) Text: Given the location of the defect and the characteristics of the surrounding skin an intermediate purse string closure was deemed most appropriate.  Undermining was performed circumfirentially around the surgical defect.  A purse string suture was then placed and tightened. Wound tension only allowed a partial closure of the circular defect. Tarsorrhaphy Text: A tarsorrhaphy was performed using Frost sutures. Manual Repair Warning Statement: We plan on removing the manually selected variable below in favor of our much easier automatic structured text blocks found in the previous tab. We decided to do this to help make the flow better and give you the full power of structured data. Manual selection is never going to be ideal in our platform and I would encourage you to avoid using manual selection from this point on, especially since I will be sunsetting this feature. It is important that you do one of two things with the customized text below. First, you can save all of the text in a word file so you can have it for future reference. Second, transfer the text to the appropriate area in the Library tab. Lastly, if there is a flap or graft type which we do not have you need to let us know right away so I can add it in before the variable is hidden. No need to panic, we plan to give you roughly 6 months to make the change. Same Histology In Subsequent Stages Text: The pattern and morphology of the tumor is as described in the first stage. No Residual Tumor Seen Histology Text: There were no malignant cells seen in the sections examined. Inflammation Suggestive Of Cancer Camouflage Histology Text: There was a dense lymphocytic infiltrate which prevented adequate histologic evaluation of adjacent structures. Bcc Histology Text: There were numerous aggregates of basaloid cells. Bcc Infiltrative Histology Text: There were numerous aggregates of basaloid cells demonstrating an infiltrative pattern. Mart-1 - Positive Histology Text: MART-1 staining demonstrates areas of higher density and clustering of melanocytes with Pagetoid spread upwards within the epidermis. The surgical margins are positive for tumor cells. Mart-1 - Negative Histology Text: MART-1 staining demonstrates a normal density and pattern of melanocytes along the dermal-epidermal junction. The surgical margins are negative for tumor cells. Information: Selecting Yes will display possible errors in your note based on the variables you have selected. This validation is only offered as a suggestion for you. PLEASE NOTE THAT THE VALIDATION TEXT WILL BE REMOVED WHEN YOU FINALIZE YOUR NOTE. IF YOU WANT TO FAX A PRELIMINARY NOTE YOU WILL NEED TO TOGGLE THIS TO 'NO' IF YOU DO NOT WANT IT IN YOUR FAXED NOTE. Bill 59 Modifier?: No - Continue to Bill 79 Modifier

## 2025-07-02 NOTE — ED ADULT NURSE NOTE - DISCHARGE DATE/TIME
"  Subjective:  Patient presented for scheduled ketamine infusion. No concerns at start of procedure. After receiving 13.86 mL of ketamine, patient began to yell. This nurse responded immediately. Patient reported sudden onset of heart palpitations, \"pounding heartbeat,\" and a feeling of impending doom. Also noted elevated BP and tachycardia.      Objective:  Vital signs showed hypertension and elevated HR. Infusion was immediately stopped. Dr. Álvarez notified and immediately came to bedside.  Rapid response called. EKG performed. Dr. Álvarez arrived to assess and remained with patient. Patient returned to baseline within approximately 15 minutes.    Infusion cautiously resumed after stabilization and while under direct 1:1 observation by this nurse.  After 5 minutes, patient stated she was experiencing a \"weird floaty feeling.\" This nurse remained in room and closely monitored patient. As HR began to rise again, infusion was stopped for a second time. At that point, patient had received a total of 23.14 mL of ketamine. Dr. Álvarez returned for reassessment.    Assessment:  Patient experienced repeat symptoms (elevated HR, abnormal sensation) during both infusion attempts. IV line removed, ketamine bag pulled from use.    Pharmacy Notification:  Pharmacy contacted. Pharmacy arrived on unit. Nurse noted that medication label looked different; Pharmacy explained this was due to a change in Epic formatting. Nurse questioned concentration of ketamine, and pharmacy obtained recipe number from bag to cross-reference. It was confirmed that the ketamine infusion was compounded at 5x the intended strength.    Plan:    No further infusion attempts made.    Patient monitored until stable.    Dr. Álvarez notified and discussed findings with patient.    Pharmacy error documented and reported per protocol.    Bag and tubing removed from patient care area.    Event reported to leadership and appropriate safety channels.    Patient " tolerated post-event observation without further complication.  Sameer was discharged home with her mother.    03-Feb-2023 22:15